# Patient Record
Sex: FEMALE | Race: BLACK OR AFRICAN AMERICAN | NOT HISPANIC OR LATINO | Employment: OTHER | URBAN - METROPOLITAN AREA
[De-identification: names, ages, dates, MRNs, and addresses within clinical notes are randomized per-mention and may not be internally consistent; named-entity substitution may affect disease eponyms.]

---

## 2017-01-04 ENCOUNTER — OFFICE VISIT (OUTPATIENT)
Dept: OPHTHALMOLOGY | Facility: CLINIC | Age: 72
End: 2017-01-04
Payer: MEDICARE

## 2017-01-04 DIAGNOSIS — E11.3513 TYPE 2 DIABETES MELLITUS WITH BOTH EYES AFFECTED BY PROLIFERATIVE RETINOPATHY AND MACULAR EDEMA, WITH LONG-TERM CURRENT USE OF INSULIN: Primary | ICD-10-CM

## 2017-01-04 DIAGNOSIS — Z79.4 TYPE 2 DIABETES MELLITUS WITH BOTH EYES AFFECTED BY PROLIFERATIVE RETINOPATHY AND MACULAR EDEMA, WITH LONG-TERM CURRENT USE OF INSULIN: Primary | ICD-10-CM

## 2017-01-04 PROCEDURE — 92012 INTRM OPH EXAM EST PATIENT: CPT | Mod: 25,S$GLB,, | Performed by: OPHTHALMOLOGY

## 2017-01-04 PROCEDURE — 99999 PR PBB SHADOW E&M-EST. PATIENT-LVL II: CPT | Mod: PBBFAC,,, | Performed by: OPHTHALMOLOGY

## 2017-01-04 PROCEDURE — 67028 INJECTION EYE DRUG: CPT | Mod: LT,S$GLB,, | Performed by: OPHTHALMOLOGY

## 2017-01-04 PROCEDURE — 99499 UNLISTED E&M SERVICE: CPT | Mod: S$GLB,,, | Performed by: OPHTHALMOLOGY

## 2017-01-04 PROCEDURE — 92134 CPTRZ OPH DX IMG PST SGM RTA: CPT | Mod: S$GLB,,, | Performed by: OPHTHALMOLOGY

## 2017-01-04 RX ORDER — CIPROFLOXACIN HYDROCHLORIDE 3 MG/ML
1 SOLUTION/ DROPS OPHTHALMIC 4 TIMES DAILY
Qty: 5 ML | Refills: 0 | Status: SHIPPED | OUTPATIENT
Start: 2017-01-04 | End: 2017-01-08

## 2017-01-04 RX ORDER — CIPROFLOXACIN HYDROCHLORIDE 3 MG/ML
SOLUTION/ DROPS OPHTHALMIC
Qty: 5 ML | Refills: 0 | Status: SHIPPED | OUTPATIENT
Start: 2017-01-04 | End: 2017-01-04 | Stop reason: SDUPTHER

## 2017-01-04 NOTE — PROGRESS NOTES
===============================  Avril Monzon,   71 y.o. female   Last visit [facility]: :9/28/2016   Last visit eye dept. 9/28/2016  [facility]:  Corrected distance visual acuity was NLP in the right eye and 20/200 in the left eye.  Tonometry     Tonometry (Applanation, 1:44 PM)      Right Left   Pressure  14                 Wearing Rx     Wearing Rx      Sphere Cylinder Axis Add   Right +1.25 +0.25 135 +3.50   Left +0.75 +1.00 135 +3.50       Type:  PAL               Not recorded        Chief Complaint   Patient presents with    Spots and/or Floaters      lost to follow up last eylea os 9/28/16//    pt states that she is seeing flashes in her left eye         HPI     Spots and/or Floaters    Additional comments:  lost to follow up last eylea os 9/28/16//    pt   states that she is seeing flashes in her left eye            Comments   1.) DM SINCE 1995  PDR  S/P LASER AND INJECTIONS OS(DR BOX AND AT [facility])  Eylea OS p/27/16 for DME/PDR  2.) NLP OD SINCE BIRTH  3.) DENSE CAT OD / 2+NS OS  4.) Asteroid Hyalosis    EYLEA OS #4 9/28/16       Last edited by JADIEL Coello MD on 1/4/2017  2:35 PM. (History)      Read Studies: y  Vitalsy    ________________  1/4/2017  1. Type 2 diabetes mellitus with both eyes affected by proliferative retinopathy and macular edema, with long-term current use of insulin      .  A lot of old jxf laser os, close focal  3+ asteroid OS  ? Laser spread OS  Cataract = to 20/30, second opinion with Dr. Taylor in clinic today    1/4/2017  Diagnosis :  Os dme  Today:   Eylea (afibercept) 2 mg/0.05 ml Intravitreal Injection , OS   Follow up: rtc 1 mo      Call 24/7 for any worsening of vision. Check  OU QD. Gave my home phone number.      Procedure  Note:   OS}  Eylea (afibercept) 2 mg/0.05 ml Intravitreal Injection    I have explained the Risks, Benefits and Alternatives of the procedure in detail.  The patient voices understanding and all questions have been answered.  The patient agrees to  proceed as discussed.  LIDOCAINE 2% subconj bleb was used for anesthesia.  Topical betadine was used for antisepsis.  0.05 cc was  injected 3.7 mm from corneal limbus in the inferotemporal quadrant.  Following injection the IOP was less than thirty (<30) by tonopen.  The eye was then thoroughly irrigated with BSS.  Patient tolerated procedure well.  No complications were observed.  The Patient was educated that mild irritation tonight was normal secondary to topical antispsis use.  Pt was advised to call at any time day or night for pain, redness, or any decline in vision. I gave the patient my home number as well as the clinic on call number. Daily visual checks and Amsler grid testing were reviewed.    JADIEL Coello MD  Procedure ordered: y  Consent: y  Pre auth: y  MAR:y  Opnote: y  Charge capture:y  Sided procedure note: y       ===========================

## 2017-01-04 NOTE — MR AVS SNAPSHOT
[facility] Ophthalmology  [address]  [place] [address]  Phone: [phone]  Fax: [phone]                  Avril Monzon   2017 1:15 PM   Office Visit    Description:  Female : 1945   Provider:  JADIEL Coello MD   Department:  [facility] - Ophthalmology           Reason for Visit     Spots and/or Floaters           Diagnoses this Visit        Comments    Type 2 diabetes mellitus with both eyes affected by proliferative retinopathy and macular edema, with long-term current use of insulin    -  Primary            To Do List           Future Appointments        Provider Department Dept Phone    2017 9:45 AM LABORATORY, [facility] [phone]    2017 2:00 PM Rose Germain MD [facility] Internal Medicine [phone]      Goals (5 Years of Data)     None      Follow-Up and Disposition     Return in about 1 month (around 2017).       These Medications        Disp Refills Start End    ciprofloxacin HCl (CILOXAN) 0.3 % ophthalmic solution 5 mL 0 2017    Administer 1 drop in left eye, every 2 hours, while awake, for 2 days.  Then 1 drop, every 4 hours, while awake, for the next 5 days.    Pharmacy: [facility] [address] [place] [address] #: [phone]         [facility] On Call     [facility] On Call Nurse Care Line - 24/7 Assistance  Registered nurses in the [facility] Center provide clinical advisement, health education, appointment booking, and other advisory services.  Call for this free service at [phone].             Medications           Message regarding Medications     Verify the changes and/or additions to your medication regime listed below are the same as discussed with your clinician today.  If any of these changes or additions are incorrect, please notify your healthcare provider.        START taking these NEW medications        Refills    ciprofloxacin HCl (CILOXAN) 0.3 % ophthalmic  solution 0    Sig: Administer 1 drop in left eye, every 2 hours, while awake, for 2 days.  Then 1 drop, every 4 hours, while awake, for the next 5 days.    Class: Normal      These medications were administered today        Dose Freq    aflibercept Soln 2 mg 2 mg Clinic/HOD 1 time    Si.05 mLs (2 mg total) by Intravitreal route one time.    Class: Normal    Route: Intravitreal           Verify that the below list of medications is an accurate representation of the medications you are currently taking.  If none reported, the list may be blank. If incorrect, please contact your healthcare provider. Carry this list with you in case of emergency.           Current Medications     amlodipine (NORVASC) 10 MG tablet Take 1 tablet (10 mg total) by mouth once daily.    aspirin 81 MG Chew Take 1 tablet (81 mg total) by mouth once daily.    atorvastatin (LIPITOR) 80 MG tablet Take 80 mg by mouth once daily.    atorvastatin (LIPITOR) 80 MG tablet TAKE ONE TABLET BY MOUTH ONCE DAILY    blood sugar diagnostic Strp 1 strip by Misc.(Non-Drug; Combo Route) route 3 (three) times daily. relion ultima    calcitRIOL (ROCALTROL) 0.25 MCG Cap Take 1 capsule (0.25 mcg total) by mouth every other day.    CMPD Flurbiprofen 10%, Cyclobenzaprine 2%, Gabapentin 6%, Lidocaine 2%, & Prilocaine 2% in Lipoderm Active-Max Apply 1 Pump (1.6 g total) topically 2 (two) times daily.    furosemide (LASIX) 20 MG tablet Take 1 tablet (20 mg total) by mouth 2 (two) times daily.    hydrALAZINE (APRESOLINE) 10 MG tablet Take 2 tablets (20 mg total) by mouth every 8 (eight) hours.    insulin NPH-insulin regular, 70/30, (NOVOLIN 70/30) 100 unit/mL (70-30) injection INJECT 30 UNITS in am 25 units in pm    lancets (ACCU-CHEK SOFTCLIX LANCETS) Misc 1 lancet by Misc.(Non-Drug; Combo Route) route 3 (three) times daily.    lisinopril (PRINIVIL,ZESTRIL) 20 MG tablet Take 40 mg by mouth once daily.    ciprofloxacin HCl (CILOXAN) 0.3 % ophthalmic solution Administer  1 drop in left eye, every 2 hours, while awake, for 2 days.  Then 1 drop, every 4 hours, while awake, for the next 5 days.           Clinical Reference Information           Allergies as of 1/4/2017     Codeine    Darvon [Propoxyphene]      Immunizations Administered on Date of Encounter - 1/4/2017     None      Orders Placed During Today's Visit      Normal Orders This Visit    Posterior Segment OCT Retina-Both eyes     Prior Authorization Order

## 2017-02-01 DIAGNOSIS — E11.40 TYPE 2 DIABETES MELLITUS WITH DIABETIC NEUROPATHY: ICD-10-CM

## 2017-02-02 RX ORDER — HUMAN INSULIN 100 [USP'U]/ML
INJECTION, SUSPENSION SUBCUTANEOUS
Qty: 2 VIAL | Refills: 0 | Status: SHIPPED | OUTPATIENT
Start: 2017-02-02 | End: 2017-10-10 | Stop reason: SDUPTHER

## 2017-02-20 ENCOUNTER — PATIENT OUTREACH (OUTPATIENT)
Dept: ADMINISTRATIVE | Facility: HOSPITAL | Age: 72
End: 2017-02-20
Payer: MEDICAID

## 2017-02-20 DIAGNOSIS — M89.9 BONE DISORDER: Primary | ICD-10-CM

## 2017-02-28 ENCOUNTER — OFFICE VISIT (OUTPATIENT)
Dept: OPHTHALMOLOGY | Facility: CLINIC | Age: 72
End: 2017-02-28
Payer: MEDICARE

## 2017-02-28 DIAGNOSIS — E11.3513 TYPE 2 DIABETES MELLITUS WITH BOTH EYES AFFECTED BY PROLIFERATIVE RETINOPATHY AND MACULAR EDEMA, WITH LONG-TERM CURRENT USE OF INSULIN: Primary | ICD-10-CM

## 2017-02-28 DIAGNOSIS — Z79.4 TYPE 2 DIABETES MELLITUS WITH BOTH EYES AFFECTED BY PROLIFERATIVE RETINOPATHY AND MACULAR EDEMA, WITH LONG-TERM CURRENT USE OF INSULIN: Primary | ICD-10-CM

## 2017-02-28 PROCEDURE — 1126F AMNT PAIN NOTED NONE PRSNT: CPT | Mod: S$GLB,,, | Performed by: OPHTHALMOLOGY

## 2017-02-28 PROCEDURE — 99999 PR PBB SHADOW E&M-EST. PATIENT-LVL II: CPT | Mod: PBBFAC,,, | Performed by: OPHTHALMOLOGY

## 2017-02-28 PROCEDURE — 1160F RVW MEDS BY RX/DR IN RCRD: CPT | Mod: S$GLB,,, | Performed by: OPHTHALMOLOGY

## 2017-02-28 PROCEDURE — 99499 UNLISTED E&M SERVICE: CPT | Mod: S$GLB,,, | Performed by: OPHTHALMOLOGY

## 2017-02-28 PROCEDURE — 92134 CPTRZ OPH DX IMG PST SGM RTA: CPT | Mod: S$GLB,,, | Performed by: OPHTHALMOLOGY

## 2017-02-28 PROCEDURE — 99213 OFFICE O/P EST LOW 20 MIN: CPT | Mod: S$GLB,,, | Performed by: OPHTHALMOLOGY

## 2017-02-28 PROCEDURE — 1159F MED LIST DOCD IN RCRD: CPT | Mod: S$GLB,,, | Performed by: OPHTHALMOLOGY

## 2017-02-28 PROCEDURE — 1157F ADVNC CARE PLAN IN RCRD: CPT | Mod: S$GLB,,, | Performed by: OPHTHALMOLOGY

## 2017-02-28 PROCEDURE — 3044F HG A1C LEVEL LT 7.0%: CPT | Mod: S$GLB,,, | Performed by: OPHTHALMOLOGY

## 2017-02-28 NOTE — PROGRESS NOTES
===============================  Avril Monzon,   72 y.o. female   Last visit [facility]: :1/4/2017   Last visit eye dept. 1/4/2017  [facility]:  Corrected distance visual acuity was NLP in the right eye and 20/40 in the left eye.   Not recorded         Not recorded         Not recorded        Chief Complaint   Patient presents with    pdr     here to rosalia pdr, pt states vision is not good, wants to talk about lasik        HPI     pdr    Additional comments: here to rosalia pdr, pt states vision is not good,   wants to talk about lasik           Comments   Noncompliant with visits    1.) DM SINCE 1995  PDR  S/P LASER AND INJECTIONS OS(DR BOX AND AT [facility] [place])  2.) NLP OD SINCE BIRTH  3.) DENSE CAT OD / 2+NS OS  4.) Asteroid Hyalosis    EYLEA OS 1/4/17    Updated 2/28/17 CGL       Last edited by JOSE Alegre on 2/28/2017 11:34 AM. (History)      Read Studies: y  Vitalsy    ________________  2/28/2017  1. Type 2 diabetes mellitus with both eyes affected by proliferative retinopathy and macular edema, with long-term current use of insulin      moct os thin, stable from last visit  Doing great  No tx needed  rtc 6 mos       ===========================

## 2017-02-28 NOTE — MR AVS SNAPSHOT
[facility] - Ophthalmology  [address] [place] [address]  Phone: [phone]  Fax: [phone]                  Avril Monzon   2017 11:15 AM   Office Visit    Description:  Female : 1945   Provider:  JADIEL Coello MD   Department:  [facility] - Ophthalmology           Reason for Visit     pdr           Diagnoses this Visit        Comments    Type 2 diabetes mellitus with both eyes affected by proliferative retinopathy and macular edema, with long-term current use of insulin    -  Primary            To Do List           Future Appointments        Provider Department Dept Phone    3/22/2017 9:00 AM Angela Hanson MD [facility] - Nephrology [phone]    2017 8:40 AM Maged Murray DPM [facility] Podiatry [phone]    2017 9:40 AM Martínez Mcknight MD [facility] - Interventional Pain [phone]    2017 10:00 AM JADIEL Coello MD [facility] Ophthalmology [phone]      Goals (5 Years of Data)     None      Follow-Up and Disposition     Return in about 6 months (around 2017) for dm eye exam.      [facility] On Call     [facility] On Call Nurse Care Line - 24/7 Assistance  Registered nurses in the [facility] On Call Center provide clinical advisement, health education, appointment booking, and other advisory services.  Call for this free service at [phone].             Medications           Message regarding Medications     Verify the changes and/or additions to your medication regime listed below are the same as discussed with your clinician today.  If any of these changes or additions are incorrect, please notify your healthcare provider.             Verify that the below list of medications is an accurate representation of the medications you are currently taking.  If none reported, the list may be blank. If incorrect, please contact your healthcare provider. Carry this list with you in case of emergency.           Current Medications     amlodipine (NORVASC) 10 MG tablet Take 1  tablet (10 mg total) by mouth once daily.    aspirin 81 MG Chew Take 1 tablet (81 mg total) by mouth once daily.    atorvastatin (LIPITOR) 80 MG tablet Take 80 mg by mouth once daily.    atorvastatin (LIPITOR) 80 MG tablet TAKE ONE TABLET BY MOUTH ONCE DAILY    blood sugar diagnostic Strp 1 strip by Misc.(Non-Drug; Combo Route) route 3 (three) times daily. relion ultima    calcitRIOL (ROCALTROL) 0.25 MCG Cap Take 1 capsule (0.25 mcg total) by mouth every other day.    CMPD Flurbiprofen 10%, Cyclobenzaprine 2%, Gabapentin 6%, Lidocaine 2%, & Prilocaine 2% in Lipoderm Active-Max Apply 1 Pump (1.6 g total) topically 2 (two) times daily.    furosemide (LASIX) 20 MG tablet Take 1 tablet (20 mg total) by mouth 2 (two) times daily.    hydrALAZINE (APRESOLINE) 10 MG tablet Take 2 tablets (20 mg total) by mouth every 8 (eight) hours.    lancets (ACCU-CHEK SOFTCLIX LANCETS) Misc 1 lancet by Misc.(Non-Drug; Combo Route) route 3 (three) times daily.    lisinopril (PRINIVIL,ZESTRIL) 20 MG tablet Take 40 mg by mouth once daily.    NOVOLIN 70/30 100 unit/mL (70-30) injection INJECT SUBCUTANEOUSLY 30 UNITS IN THE MORNING AND INJECT 25 UNITS IN THE EVENING           Clinical Reference Information           Allergies as of 2/28/2017     Codeine    Darvon [Propoxyphene]      Immunizations Administered on Date of Encounter - 2/28/2017     None      Orders Placed During Today's Visit      Normal Orders This Visit    Posterior Segment OCT Retina-Both eyes       Language Assistance Services     ATTENTION: Language assistance services are available, free of charge. Please call [phone].      ATENCIÓN: Si blaisela victoriano, tiene a ramirez disposición servicios gratuitos de asistencia lingüística. Llame al [phone].     [facility] Ý: N?u b?n nói Ti?ng Vi?t, có các d?ch v? h? tr? ngôn ng? mi?n phí dành cho b?n. G?i s? [phone].         [facility] - Ophthalmology complies with applicable Federal civil rights laws and does not discriminate on the  basis of race, color, national origin, age, disability, or sex.

## 2017-03-20 RX ORDER — CALCITRIOL 0.25 UG/1
CAPSULE ORAL
Qty: 15 CAPSULE | Refills: 0 | Status: SHIPPED | OUTPATIENT
Start: 2017-03-20 | End: 2017-05-09

## 2017-03-23 RX ORDER — AMLODIPINE BESYLATE 10 MG/1
TABLET ORAL
Qty: 30 TABLET | Refills: 11 | Status: SHIPPED | OUTPATIENT
Start: 2017-03-23 | End: 2018-04-08 | Stop reason: SDUPTHER

## 2017-05-04 ENCOUNTER — OFFICE VISIT (OUTPATIENT)
Dept: INTERNAL MEDICINE | Facility: CLINIC | Age: 72
End: 2017-05-04
Payer: MEDICARE

## 2017-05-04 ENCOUNTER — HOSPITAL ENCOUNTER (OUTPATIENT)
Dept: RADIOLOGY | Facility: HOSPITAL | Age: 72
Discharge: HOME | End: 2017-05-04
Attending: INTERNAL MEDICINE
Payer: MEDICARE

## 2017-05-04 VITALS
OXYGEN SATURATION: 98 % | HEIGHT: 65 IN | DIASTOLIC BLOOD PRESSURE: 52 MMHG | BODY MASS INDEX: 26.12 KG/M2 | WEIGHT: 156.75 LBS | TEMPERATURE: 98 F | SYSTOLIC BLOOD PRESSURE: 118 MMHG | HEART RATE: 77 BPM

## 2017-05-04 DIAGNOSIS — J06.9 ACUTE URI: ICD-10-CM

## 2017-05-04 DIAGNOSIS — R05.9 COUGH: ICD-10-CM

## 2017-05-04 DIAGNOSIS — N18.9 CKD (CHRONIC KIDNEY DISEASE), UNSPECIFIED STAGE: ICD-10-CM

## 2017-05-04 DIAGNOSIS — I15.2 HYPERTENSION ASSOCIATED WITH DIABETES: ICD-10-CM

## 2017-05-04 DIAGNOSIS — E11.59 HYPERTENSION ASSOCIATED WITH DIABETES: ICD-10-CM

## 2017-05-04 DIAGNOSIS — R05.9 COUGH: Primary | ICD-10-CM

## 2017-05-04 LAB
FLUAV AG SPEC QL IA: NEGATIVE
FLUBV AG SPEC QL IA: NEGATIVE
SPECIMEN SOURCE: NORMAL

## 2017-05-04 PROCEDURE — 3074F SYST BP LT 130 MM HG: CPT | Mod: S$GLB,,, | Performed by: PHYSICIAN ASSISTANT

## 2017-05-04 PROCEDURE — 71020 XR CHEST PA AND LATERAL: CPT | Mod: 26,,, | Performed by: RADIOLOGY

## 2017-05-04 PROCEDURE — 99999 PR PBB SHADOW E&M-EST. PATIENT-LVL IV: CPT | Mod: PBBFAC,,, | Performed by: PHYSICIAN ASSISTANT

## 2017-05-04 PROCEDURE — 99213 OFFICE O/P EST LOW 20 MIN: CPT | Mod: S$GLB,,, | Performed by: PHYSICIAN ASSISTANT

## 2017-05-04 PROCEDURE — 99499 UNLISTED E&M SERVICE: CPT | Mod: S$GLB,,, | Performed by: PHYSICIAN ASSISTANT

## 2017-05-04 PROCEDURE — 1157F ADVNC CARE PLAN IN RCRD: CPT | Mod: 8P,S$GLB,, | Performed by: PHYSICIAN ASSISTANT

## 2017-05-04 PROCEDURE — 1160F RVW MEDS BY RX/DR IN RCRD: CPT | Mod: S$GLB,,, | Performed by: PHYSICIAN ASSISTANT

## 2017-05-04 PROCEDURE — 1159F MED LIST DOCD IN RCRD: CPT | Mod: S$GLB,,, | Performed by: PHYSICIAN ASSISTANT

## 2017-05-04 PROCEDURE — 3078F DIAST BP <80 MM HG: CPT | Mod: S$GLB,,, | Performed by: PHYSICIAN ASSISTANT

## 2017-05-04 PROCEDURE — 71020 XR CHEST PA AND LATERAL: CPT | Mod: TC,PO

## 2017-05-04 PROCEDURE — 3066F NEPHROPATHY DOC TX: CPT | Mod: S$GLB,,, | Performed by: PHYSICIAN ASSISTANT

## 2017-05-04 PROCEDURE — 3044F HG A1C LEVEL LT 7.0%: CPT | Mod: S$GLB,,, | Performed by: PHYSICIAN ASSISTANT

## 2017-05-04 RX ORDER — DOXYCYCLINE HYCLATE 100 MG
100 TABLET ORAL 2 TIMES DAILY
COMMUNITY
End: 2017-05-16

## 2017-05-04 RX ORDER — BENZONATATE 100 MG/1
100 CAPSULE ORAL 3 TIMES DAILY PRN
COMMUNITY
End: 2017-05-16

## 2017-05-04 NOTE — MR AVS SNAPSHOT
[facility] Internal Medicine  [address] [place] [address]  Phone: [phone]  Fax: [phone]                  Avril Monzon   2017 1:20 PM   Office Visit    Description:  Female : 1945   Provider:  WILLIAN Delong   Department:  [facility] - Internal Medicine           Reason for Visit     Cough           Diagnoses this Visit        Comments    Cough    -  Primary     Acute URI                To Do List           Future Appointments        Provider Department Dept Phone    2017 2:15 PM [facility] XR2 [facility] [phone]    2017 8:00 AM Kim Doe MD [facility] Nephrology [phone]    2017 9:40 AM Martínez Mcknight MD [facility] - Interventional Pain [phone]    2017 2:30 PM [facility], [facility] Medicine [phone]    2017 3:20 PM Rose Germain MD [facility] [phone]      Goals (5 Years of Data)     None      [facility] On Call     [facility] On [facility] Nurse Care Line -  Assistance  Unless otherwise directed by your provider, please contact [facility], our nurse care line that is available for  assistance.     Registered nurses in the [facility] Center provide: appointment scheduling, clinical advisement, health education, and other advisory services.  Call: [phone] (toll free)               Medications           Message regarding Medications     Verify the changes and/or additions to your medication regime listed below are the same as discussed with your clinician today.  If any of these changes or additions are incorrect, please notify your healthcare provider.             Verify that the below list of medications is an accurate representation of the medications you are currently taking.  If none reported, the list may be blank. If incorrect, please contact your healthcare provider. Carry this list with you in case of emergency.           Current Medications     amlodipine (NORVASC) 10 MG  "tablet TAKE ONE TABLET BY MOUTH ONCE DAILY    atorvastatin (LIPITOR) 80 MG tablet Take 80 mg by mouth once daily.    benzonatate (TESSALON) 100 MG capsule Take 100 mg by mouth 3 (three) times daily as needed for Cough.    blood sugar diagnostic Strp 1 strip by Misc.(Non-Drug; Combo Route) route 3 (three) times daily. relion ultima    calcitRIOL (ROCALTROL) 0.25 MCG Cap TAKE ONE CAPSULE BY MOUTH EVERY OTHER DAY    CMPD Flurbiprofen 10%, Cyclobenzaprine 2%, Gabapentin 6%, Lidocaine 2%, & Prilocaine 2% in Lipoderm Active-Max Apply 1 Pump (1.6 g total) topically 2 (two) times daily.    doxycycline (VIBRA-TABS) 100 MG tablet Take 100 mg by mouth 2 (two) times daily.    furosemide (LASIX) 20 MG tablet Take 1 tablet (20 mg total) by mouth 2 (two) times daily.    hydrALAZINE (APRESOLINE) 10 MG tablet Take 2 tablets (20 mg total) by mouth every 8 (eight) hours.    lancets (ACCU-CHEK SOFTCLIX LANCETS) Misc 1 lancet by Misc.(Non-Drug; Combo Route) route 3 (three) times daily.    lisinopril (PRINIVIL,ZESTRIL) 20 MG tablet Take 40 mg by mouth once daily.    NOVOLIN 70/30 100 unit/mL (70-30) injection INJECT SUBCUTANEOUSLY 30 UNITS IN THE MORNING AND INJECT 25 UNITS IN THE EVENING    aspirin 81 MG Chew Take 1 tablet (81 mg total) by mouth once daily.           Clinical Reference Information           Your Vitals Were     BP Pulse Temp Height    118/52 (BP Location: Right arm, Patient Position: Sitting, BP Method: Manual) 77 97.6 °F (36.4 °C) (Tympanic) 5' 5" (1.651 m)    Weight SpO2 BMI    71.1 kg (156 lb 12 oz) 98% 26.08 kg/m2      Blood Pressure          Most Recent Value    BP  (!)  118/52      Allergies as of 5/4/2017     Codeine    Darvon [Propoxyphene]      Immunizations Administered on Date of Encounter - 5/4/2017     None      Orders Placed During Today's Visit      Normal Orders This Visit    Influenza antigen Nasopharyngeal Swab     Future Labs/Procedures Expected by Expires    X-Ray Chest PA And Lateral  5/4/2017 " 5/4/2018      Language Assistance Services     ATTENTION: Language assistance services are available, free of charge. Please call [phone].      ATENCIÓN: Si habla victoriano, tiene a ramirez disposición servicios gratuitos de asistencia lingüística. Llame al [phone].     CHÚ Ý: N?u b?n nói Ti?ng Vi?t, có các d?ch v? h? tr? ngôn ng? mi?n phí dành cho b?n. G?i s? [phone].         [facility] - Internal Medicine complies with applicable Federal civil rights laws and does not discriminate on the basis of race, color, national origin, age, disability, or sex.

## 2017-05-04 NOTE — PROGRESS NOTES
Subjective:       Patient ID: Avril Monzon is a 72 y.o. female.    Chief Complaint: Cough    HPI Comments: 72 year old female c/o myalgias, feeling warm, slightly productive cough with unknown color sputum, sneezing, clear rhinorrhea, scratchy throat, and nasal congestion X one week. She reports B anterior lower rib tenderness with coughing now. She reports no chills, ear pain, N/V, SOB, exertional sxs, edema, rash, or other medical complaints. She reports being seen at [facility] 5/1/17 and was prescribed doxycycline X 7 days and Tessalon Perles PRN. She reports taking the prescribed medications but sxs are continuing to worsen. Pt is overdue to see her PCP and specialists.    Past Medical History:  No date: Back pain  No date: Cataract  No date: CKD stage G4/A3, GFR 15 - 29 and albumin creat*      Comment: GFR 40% Jan 2014 and 33% ub 3/2014 ([facility])  No date: Diabetic retinopathy  No date: DM (diabetes mellitus) type II controlled, maximiliano*  No date: GERD (gastroesophageal reflux disease)  No date: Glaucoma  No date: Hyperlipidemia  No date: Hypertension  No date: Peripheral neuropathy  No date: Proteinuria      Comment: >6 mo   No date: Seizures      Comment: BRG 1/2014 -dick CT NRI showed small vessel        Cough   Associated symptoms include rhinorrhea and a sore throat. Pertinent negatives include no chest pain, chills, ear pain, fever, headaches, rash, shortness of breath or wheezing.     Review of Systems   Constitutional: Negative for chills and fever.   HENT: Positive for congestion, rhinorrhea and sore throat. Negative for ear pain.    Respiratory: Positive for cough. Negative for shortness of breath and wheezing.    Cardiovascular: Negative for chest pain, palpitations and leg swelling.   Gastrointestinal: Negative for nausea and vomiting.   Skin: Negative for rash.   Neurological: Negative for dizziness, weakness, numbness and headaches.   Psychiatric/Behavioral: Negative for confusion.        Objective:      Physical Exam   Constitutional: She is oriented to person, place, and time. She appears well-developed and well-nourished. No distress.   HENT:   Head: Normocephalic and atraumatic.   Right Ear: Tympanic membrane and ear canal normal.   Left Ear: Tympanic membrane and ear canal normal.   Mouth/Throat: Oropharynx is clear and moist. No oropharyngeal exudate.   Cobblestone appearance of posterior pharynx   Eyes: EOM are normal. No scleral icterus.   Neck: Neck supple.   Cardiovascular: Normal rate and regular rhythm.    Pulmonary/Chest: Effort normal and breath sounds normal. No respiratory distress. She has no decreased breath sounds. She has no wheezes. She has no rhonchi. She has no rales.   Musculoskeletal: Normal range of motion. She exhibits no edema.   Lymphadenopathy:     She has no cervical adenopathy.   Neurological: She is alert and oriented to person, place, and time. No cranial nerve deficit.   Skin: Skin is warm and dry. No rash noted.   Psychiatric: She has a normal mood and affect. Her speech is normal and behavior is normal. Thought content normal.       Assessment:       1. Cough    2. Acute URI    3. Hypertension associated with diabetes    4. CKD (chronic kidney disease), unspecified stage        Plan:         1. CXR and rapid flu test today with review following.  2. Continue doxycycline as directed for now. Stop Tessalon Perles if not helping.   3. F/u with nephrology soon for eval of CKD. F/u with PCP for health management.  4. Monitor for new or worsening sxs. ER if sxs become severe.

## 2017-05-05 ENCOUNTER — TELEPHONE (OUTPATIENT)
Dept: INTERNAL MEDICINE | Facility: CLINIC | Age: 72
End: 2017-05-05

## 2017-05-05 NOTE — TELEPHONE ENCOUNTER
----- Message from WILLIAN Delong sent at 5/5/2017  8:07 AM CDT -----  Flu test negative. Recommend she complete the doxycycline. If no better by Monday, needs to RTC for re-evaluation. Urgent care if worse over the weekend.

## 2017-05-05 NOTE — TELEPHONE ENCOUNTER
----- Message from WILLIAN Delong sent at 5/4/2017  3:23 PM CDT -----  CXR is clear. Still waiting on flu result.

## 2017-05-08 ENCOUNTER — OFFICE VISIT (OUTPATIENT)
Dept: NEPHROLOGY | Facility: CLINIC | Age: 72
End: 2017-05-08
Payer: MEDICARE

## 2017-05-08 ENCOUNTER — LAB VISIT (OUTPATIENT)
Dept: LAB | Facility: HOSPITAL | Age: 72
End: 2017-05-08
Attending: INTERNAL MEDICINE
Payer: MEDICARE

## 2017-05-08 VITALS
WEIGHT: 154.75 LBS | HEIGHT: 67 IN | DIASTOLIC BLOOD PRESSURE: 50 MMHG | HEART RATE: 68 BPM | BODY MASS INDEX: 24.29 KG/M2 | SYSTOLIC BLOOD PRESSURE: 130 MMHG

## 2017-05-08 DIAGNOSIS — E11.29 PROTEINURIA DUE TO TYPE 2 DIABETES MELLITUS: ICD-10-CM

## 2017-05-08 DIAGNOSIS — N18.4 ANEMIA OF CHRONIC RENAL FAILURE, STAGE 4 (SEVERE): ICD-10-CM

## 2017-05-08 DIAGNOSIS — E11.21 DIABETIC NEPHROPATHY ASSOCIATED WITH TYPE 2 DIABETES MELLITUS: ICD-10-CM

## 2017-05-08 DIAGNOSIS — N18.4 CHRONIC KIDNEY DISEASE, STAGE IV (SEVERE): ICD-10-CM

## 2017-05-08 DIAGNOSIS — D63.1 ANEMIA OF CHRONIC RENAL FAILURE, STAGE 4 (SEVERE): ICD-10-CM

## 2017-05-08 DIAGNOSIS — R80.9 PROTEINURIA DUE TO TYPE 2 DIABETES MELLITUS: ICD-10-CM

## 2017-05-08 DIAGNOSIS — E87.5 HYPERKALEMIA: ICD-10-CM

## 2017-05-08 DIAGNOSIS — N25.81 SECONDARY HYPERPARATHYROIDISM: ICD-10-CM

## 2017-05-08 DIAGNOSIS — E87.20 METABOLIC ACIDEMIA: ICD-10-CM

## 2017-05-08 DIAGNOSIS — N18.4 CHRONIC KIDNEY DISEASE, STAGE IV (SEVERE): Primary | ICD-10-CM

## 2017-05-08 DIAGNOSIS — I10 ESSENTIAL HYPERTENSION: ICD-10-CM

## 2017-05-08 PROBLEM — E11.319 DIABETIC RETINOPATHY: Status: ACTIVE | Noted: 2017-05-08

## 2017-05-08 LAB
25(OH)D3+25(OH)D2 SERPL-MCNC: 9 NG/ML
ALBUMIN SERPL BCP-MCNC: 3.6 G/DL
ANION GAP SERPL CALC-SCNC: 11 MMOL/L
BASOPHILS # BLD AUTO: 0.01 K/UL
BASOPHILS NFR BLD: 0.2 %
BUN SERPL-MCNC: 89 MG/DL
CALCIUM SERPL-MCNC: 9.2 MG/DL
CHLORIDE SERPL-SCNC: 109 MMOL/L
CO2 SERPL-SCNC: 19 MMOL/L
CREAT SERPL-MCNC: 3.7 MG/DL
DIFFERENTIAL METHOD: ABNORMAL
EOSINOPHIL # BLD AUTO: 0 K/UL
EOSINOPHIL NFR BLD: 0.5 %
ERYTHROCYTE [DISTWIDTH] IN BLOOD BY AUTOMATED COUNT: 13.2 %
EST. GFR  (AFRICAN AMERICAN): 13.6 ML/MIN/1.73 M^2
EST. GFR  (NON AFRICAN AMERICAN): 11.8 ML/MIN/1.73 M^2
GLUCOSE SERPL-MCNC: 174 MG/DL
HCT VFR BLD AUTO: 31.2 %
HGB BLD-MCNC: 9.8 G/DL
IRON SERPL-MCNC: 75 UG/DL
LYMPHOCYTES # BLD AUTO: 1.1 K/UL
LYMPHOCYTES NFR BLD: 24.9 %
MCH RBC QN AUTO: 28.8 PG
MCHC RBC AUTO-ENTMCNC: 31.4 %
MCV RBC AUTO: 92 FL
MONOCYTES # BLD AUTO: 0.2 K/UL
MONOCYTES NFR BLD: 3.7 %
NEUTROPHILS # BLD AUTO: 3.1 K/UL
NEUTROPHILS NFR BLD: 70.7 %
PHOSPHATE SERPL-MCNC: 4.4 MG/DL
PLATELET # BLD AUTO: 217 K/UL
PMV BLD AUTO: 10.6 FL
POTASSIUM SERPL-SCNC: 5.2 MMOL/L
PTH-INTACT SERPL-MCNC: 292 PG/ML
RBC # BLD AUTO: 3.4 M/UL
SATURATED IRON: 26 %
SODIUM SERPL-SCNC: 139 MMOL/L
TOTAL IRON BINDING CAPACITY: 284 UG/DL
TRANSFERRIN SERPL-MCNC: 192 MG/DL
WBC # BLD AUTO: 4.34 K/UL

## 2017-05-08 PROCEDURE — 1126F AMNT PAIN NOTED NONE PRSNT: CPT | Mod: S$GLB,,, | Performed by: INTERNAL MEDICINE

## 2017-05-08 PROCEDURE — 3044F HG A1C LEVEL LT 7.0%: CPT | Mod: S$GLB,,, | Performed by: INTERNAL MEDICINE

## 2017-05-08 PROCEDURE — 36415 COLL VENOUS BLD VENIPUNCTURE: CPT

## 2017-05-08 PROCEDURE — 82610 CYSTATIN C: CPT

## 2017-05-08 PROCEDURE — 80069 RENAL FUNCTION PANEL: CPT

## 2017-05-08 PROCEDURE — 3066F NEPHROPATHY DOC TX: CPT | Mod: S$GLB,,, | Performed by: INTERNAL MEDICINE

## 2017-05-08 PROCEDURE — 99499 UNLISTED E&M SERVICE: CPT | Mod: S$GLB,,, | Performed by: INTERNAL MEDICINE

## 2017-05-08 PROCEDURE — 99999 PR PBB SHADOW E&M-EST. PATIENT-LVL III: CPT | Mod: PBBFAC,,, | Performed by: INTERNAL MEDICINE

## 2017-05-08 PROCEDURE — 1160F RVW MEDS BY RX/DR IN RCRD: CPT | Mod: S$GLB,,, | Performed by: INTERNAL MEDICINE

## 2017-05-08 PROCEDURE — 83540 ASSAY OF IRON: CPT

## 2017-05-08 PROCEDURE — 83970 ASSAY OF PARATHORMONE: CPT

## 2017-05-08 PROCEDURE — 99215 OFFICE O/P EST HI 40 MIN: CPT | Mod: S$GLB,,, | Performed by: INTERNAL MEDICINE

## 2017-05-08 PROCEDURE — 85025 COMPLETE CBC W/AUTO DIFF WBC: CPT

## 2017-05-08 PROCEDURE — 3075F SYST BP GE 130 - 139MM HG: CPT | Mod: S$GLB,,, | Performed by: INTERNAL MEDICINE

## 2017-05-08 PROCEDURE — 1159F MED LIST DOCD IN RCRD: CPT | Mod: S$GLB,,, | Performed by: INTERNAL MEDICINE

## 2017-05-08 PROCEDURE — 82306 VITAMIN D 25 HYDROXY: CPT

## 2017-05-08 PROCEDURE — 3078F DIAST BP <80 MM HG: CPT | Mod: S$GLB,,, | Performed by: INTERNAL MEDICINE

## 2017-05-08 NOTE — MR AVS SNAPSHOT
[facility] Nephrology  [address] [place] [address]  Phone: [phone]  Fax: [phone]                  Avril Monzon   2017 2:00 PM   Office Visit    Description:  Female : 1945   Provider:  Kim Doe MD   Department:  [facility] - Nephrology           Reason for Visit     Chronic Kidney Disease     Proteinuria     Anemia           Diagnoses this Visit        Comments    Chronic kidney disease, stage IV (severe)    -  Primary     Essential hypertension         Secondary hyperparathyroidism         Anemia of chronic renal failure, stage 4 (severe)         Proteinuria due to type 2 diabetes mellitus         Diabetic nephropathy associated with type 2 diabetes mellitus         Metabolic acidemia                To Do List           Future Appointments        Provider Department Dept Phone    2017 2:30 PM SPECIMEN, [facility] [phone]    2017 9:40 AM Martínez Mcknight MD [facility] - Interventional Pain [phone]    2017 2:30 PM [facility] Internal Medicine [phone]    2017 3:20 PM Rose Germain MD [facility] Internal Medicine [phone]    2017 10:30 AM SPECIMEN, [facility] [phone]      Goals (5 Years of Data)     None      Follow-Up and Disposition     Return in about 2 months (around 2017).      [facility]     [facility] Nurse Care Line -  Assistance  Unless otherwise directed by your provider, please contact [facility], our nurse care line that is available for  assistance.     Registered nurses in the [facility] provide: appointment scheduling, clinical advisement, health education, and other advisory services.  Call: [phone] (toll free)               Medications           Message regarding Medications     Verify the changes and/or additions to your medication regime listed below are the same as discussed with your  "clinician today.  If any of these changes or additions are incorrect, please notify your healthcare provider.        STOP taking these medications     aspirin 81 MG Chew Take 1 tablet (81 mg total) by mouth once daily.           Verify that the below list of medications is an accurate representation of the medications you are currently taking.  If none reported, the list may be blank. If incorrect, please contact your healthcare provider. Carry this list with you in case of emergency.           Current Medications     amlodipine (NORVASC) 10 MG tablet TAKE ONE TABLET BY MOUTH ONCE DAILY    atorvastatin (LIPITOR) 80 MG tablet Take 80 mg by mouth once daily.    benzonatate (TESSALON) 100 MG capsule Take 100 mg by mouth 3 (three) times daily as needed for Cough.    blood sugar diagnostic Strp 1 strip by Misc.(Non-Drug; Combo Route) route 3 (three) times daily. relion ultima    calcitRIOL (ROCALTROL) 0.25 MCG Cap TAKE ONE CAPSULE BY MOUTH EVERY OTHER DAY    CMPD Flurbiprofen 10%, Cyclobenzaprine 2%, Gabapentin 6%, Lidocaine 2%, & Prilocaine 2% in Lipoderm Active-Max Apply 1 Pump (1.6 g total) topically 2 (two) times daily.    doxycycline (VIBRA-TABS) 100 MG tablet Take 100 mg by mouth 2 (two) times daily.    furosemide (LASIX) 20 MG tablet Take 1 tablet (20 mg total) by mouth 2 (two) times daily.    hydrALAZINE (APRESOLINE) 10 MG tablet Take 2 tablets (20 mg total) by mouth every 8 (eight) hours.    lancets (ACCU-CHEK SOFTCLIX LANCETS) Misc 1 lancet by Misc.(Non-Drug; Combo Route) route 3 (three) times daily.    lisinopril (PRINIVIL,ZESTRIL) 20 MG tablet Take 40 mg by mouth once daily.    NOVOLIN 70/30 100 unit/mL (70-30) injection INJECT SUBCUTANEOUSLY 30 UNITS IN THE MORNING AND INJECT 25 UNITS IN THE EVENING           Clinical Reference Information           Your Vitals Were     BP Pulse Height Weight BMI    130/50 68 5' 7" (1.702 m) 70.2 kg (154 lb 12.2 oz) 24.24 kg/m2      Blood Pressure          Most Recent Value "    BP  (!)  130/50      Allergies as of 5/8/2017     Codeine    Darvon [Propoxyphene]      Immunizations Administered on Date of Encounter - 5/8/2017     None      Orders Placed During Today's Visit     Future Labs/Procedures Expected by Expires    Cystatin C, Serum  5/8/2017 7/7/2018    Vitamin D  5/8/2017 7/7/2018    Recurring Lab Work Interval Expires    CBC auto differential   7/7/2018    Iron and TIBC   7/7/2018    PTH, intact   7/7/2018    Renal function panel   7/7/2018    Protein / creatinine ratio, urine   7/7/2018    Urinalysis   7/7/2018      Instructions    DR. Candelario [phone]    [address]        Language Assistance Services     ATTENTION: Language assistance services are available, free of charge. Please call [phone].      ATENCIÓN: Si habla jayrojoe, tiene a ramirez disposición servicios gratuitos de asistencia lingüística. Llame al [phone].     CHÚ Ý: N?u b?n nói Ti?ng Vi?t, có các d?ch v? h? tr? ngôn ng? mi?n phí dành cho b?n. G?i s? [phone].         [facility] complies with applicable Federal civil rights laws and does not discriminate on the basis of race, color, national origin, age, disability, or sex.

## 2017-05-08 NOTE — PROGRESS NOTES
"Subjective:       Patient ID: Avril Monzon is a 72 y.o. Black or  female who presents for re-evaluation of progression of Chronic Kidney Disease; Proteinuria; and Anemia    HPI     Patient is a 73 YO AAF with diabetic nephropathy for many years. Now has CKD-IV and Hx of retinopathy. Comes for follow up as a new patient to me     Has seen dr. Hanson in the past     No labs or visit this year    Review of Systems   Constitutional: Negative for activity change, appetite change, chills, diaphoresis, fatigue, fever and unexpected weight change.   HENT: Negative for congestion, dental problem, drooling, postnasal drip, rhinorrhea and voice change.    Eyes: Negative for discharge.   Respiratory: Negative for apnea, cough, choking, chest tightness, shortness of breath, wheezing and stridor.    Cardiovascular: Negative for chest pain, palpitations and leg swelling.   Gastrointestinal: Negative for abdominal distention, blood in stool, constipation, diarrhea, nausea, rectal pain and vomiting.   Endocrine: Negative for cold intolerance, heat intolerance, polydipsia and polyuria.   Genitourinary: Negative for decreased urine volume, difficulty urinating, dysuria, enuresis, flank pain, frequency, hematuria and urgency.   Musculoskeletal: Negative for arthralgias, back pain, gait problem and joint swelling.   Skin: Negative for rash.   Allergic/Immunologic: Negative for food allergies and immunocompromised state.   Neurological: Negative for dizziness, tremors, syncope, numbness and headaches.   Hematological: Does not bruise/bleed easily.   Psychiatric/Behavioral: Negative for agitation, behavioral problems and self-injury. The patient is not nervous/anxious and is not hyperactive.    All other systems reviewed and are negative.      Objective:   BP (!) 130/50  Pulse 68  Ht 5' 7" (1.702 m)  Wt 70.2 kg (154 lb 12.2 oz)  BMI 24.24 kg/m2     Physical Exam   Constitutional: She is oriented to person, place, and " time. No distress.   HENT:   Head: Normocephalic and atraumatic.   Nose: Nose normal.   Eyes: Conjunctivae and EOM are normal. Pupils are equal, round, and reactive to light.   Neck: Normal range of motion. No JVD present. No tracheal deviation present. No thyromegaly present.   Cardiovascular: Normal rate, regular rhythm, normal heart sounds and intact distal pulses.  Exam reveals no gallop and no friction rub.    No murmur heard.  Pulmonary/Chest: Effort normal and breath sounds normal. No respiratory distress. She has no wheezes. She has no rales. She exhibits no tenderness.   Abdominal: Soft. Bowel sounds are normal. She exhibits no distension and no mass. There is no tenderness. No hernia.   Musculoskeletal: Normal range of motion. She exhibits no edema, tenderness or deformity.   Neurological: She is alert and oriented to person, place, and time. She has normal reflexes. She displays normal reflexes. No cranial nerve deficit. She exhibits normal muscle tone. Coordination normal.   Skin: Skin is warm. She is not diaphoretic. No erythema. There is pallor.   Psychiatric: She has a normal mood and affect. Her behavior is normal. Judgment and thought content normal.   Nursing note and vitals reviewed.        Lab Results   Component Value Date    CREATININE 3.7 (H) 05/08/2017    BUN 89 (H) 05/08/2017     05/08/2017    K 5.2 (H) 05/08/2017     05/08/2017    CO2 19 (L) 05/08/2017     Lab Results   Component Value Date    WBC 4.34 05/08/2017    HGB 9.8 (L) 05/08/2017    HCT 31.2 (L) 05/08/2017    MCV 92 05/08/2017     05/08/2017     Lab Results   Component Value Date    .0 (H) 05/08/2017    CALCIUM 9.2 05/08/2017    PHOS 4.4 05/08/2017     Vitamin D level is 9 checked on 5/8/17    Assessment:    )    1. Chronic kidney disease, stage IV (severe)    2. Essential hypertension    3. Secondary hyperparathyroidism    4. Anemia of chronic renal failure, stage 4 (severe)    5. Proteinuria due to type  2 diabetes mellitus    6. Diabetic nephropathy associated with type 2 diabetes mellitus    7. Metabolic acidemia    8. Hyperkalemia        Plan:         1. Chronic kidney Disease stage IV: check cystatin C; avoid NSAids; patient's kidney function is about 15-20 percent.  She will need education class on follow-up.    2. HPT with severe vitamin D deficiency: Start on vitamin D  Rayaldee 30 microgram daily.  Stop calcitriol.    3. Anemia: check iron today ( has been in heme dept ) Will arrange follow-up in the hematology division for erythropoietin administration    4. HTN : controlled     5. Protienuria: check urine today     6:Acidosis: Add sodium bicarbonate    7: LBP: MRI reviewed : refer to chiropracter    8.  Hyperkalemia: Will treat metabolic acidosis and consider Kayexalate on follow-up      follow up in 2 months       Kim Doe MD

## 2017-05-09 ENCOUNTER — LAB VISIT (OUTPATIENT)
Dept: LAB | Facility: HOSPITAL | Age: 72
End: 2017-05-09
Attending: INTERNAL MEDICINE
Payer: MEDICARE

## 2017-05-09 DIAGNOSIS — R80.9 PROTEINURIA DUE TO TYPE 2 DIABETES MELLITUS: ICD-10-CM

## 2017-05-09 DIAGNOSIS — E11.21 DIABETIC NEPHROPATHY ASSOCIATED WITH TYPE 2 DIABETES MELLITUS: ICD-10-CM

## 2017-05-09 DIAGNOSIS — E11.29 PROTEINURIA DUE TO TYPE 2 DIABETES MELLITUS: ICD-10-CM

## 2017-05-09 DIAGNOSIS — E87.20 METABOLIC ACIDEMIA: ICD-10-CM

## 2017-05-09 DIAGNOSIS — N18.4 CHRONIC KIDNEY DISEASE, STAGE IV (SEVERE): ICD-10-CM

## 2017-05-09 DIAGNOSIS — I10 ESSENTIAL HYPERTENSION: ICD-10-CM

## 2017-05-09 DIAGNOSIS — N18.4 ANEMIA OF CHRONIC RENAL FAILURE, STAGE 4 (SEVERE): ICD-10-CM

## 2017-05-09 DIAGNOSIS — D63.1 ANEMIA OF CHRONIC RENAL FAILURE, STAGE 4 (SEVERE): ICD-10-CM

## 2017-05-09 DIAGNOSIS — N25.81 SECONDARY HYPERPARATHYROIDISM: ICD-10-CM

## 2017-05-09 LAB
BACTERIA #/AREA URNS HPF: NORMAL /HPF
BILIRUB UR QL STRIP: NEGATIVE
CLARITY UR: CLEAR
COLOR UR: YELLOW
CREAT UR-MCNC: 73 MG/DL
GLUCOSE UR QL STRIP: NEGATIVE
HGB UR QL STRIP: NEGATIVE
HYALINE CASTS #/AREA URNS LPF: 0 /LPF
KETONES UR QL STRIP: NEGATIVE
LEUKOCYTE ESTERASE UR QL STRIP: NEGATIVE
MICROSCOPIC COMMENT: NORMAL
NITRITE UR QL STRIP: NEGATIVE
PH UR STRIP: 6 [PH] (ref 5–8)
PROT UR QL STRIP: ABNORMAL
PROT UR-MCNC: 38 MG/DL
PROT/CREAT RATIO, UR: 0.52
RBC #/AREA URNS HPF: 0 /HPF (ref 0–4)
SP GR UR STRIP: 1.01 (ref 1–1.03)
SQUAMOUS #/AREA URNS HPF: 1 /HPF
URN SPEC COLLECT METH UR: ABNORMAL
WBC #/AREA URNS HPF: 0 /HPF (ref 0–5)
WBC CLUMPS URNS QL MICRO: NORMAL

## 2017-05-09 PROCEDURE — 84156 ASSAY OF PROTEIN URINE: CPT

## 2017-05-09 PROCEDURE — 81000 URINALYSIS NONAUTO W/SCOPE: CPT | Mod: PO

## 2017-05-09 RX ORDER — SODIUM BICARBONATE 325 MG/1
325 TABLET ORAL 3 TIMES DAILY
Qty: 90 TABLET | Refills: 11 | Status: SHIPPED | OUTPATIENT
Start: 2017-05-09 | End: 2018-06-12

## 2017-05-10 ENCOUNTER — TELEPHONE (OUTPATIENT)
Dept: NEPHROLOGY | Facility: CLINIC | Age: 72
End: 2017-05-10

## 2017-05-10 LAB
CYSTATIN C SERPL-MCNC: 2.96 MG/L
GFR/BSA.PRED SERPLBLD CYS-BASED-ARV: 16 ML/MIN/BSA

## 2017-05-10 NOTE — TELEPHONE ENCOUNTER
Called and informed patient of results and Dr. Doe's plan. Scheduled patient for her appointment and filled out Rayaldee form. Patient expressed understanding.

## 2017-05-10 NOTE — TELEPHONE ENCOUNTER
----- Message from Kim Doe MD sent at 5/10/2017 11:16 AM CDT -----  Making sure this was done ?  ----- Message -----     From: Hanane Phillip LPN     Sent: 5/9/2017   8:06 AM       To: Antonia Manzo LPN        ----- Message -----     From: Kim Doe MD     Sent: 5/9/2017   7:50 AM       To: Brook BURGER Staff    Please submit the paperwork for the new vitamin D have prescribed for the patient    Also notified the patient that she has to prescriptions to  one for the new vitamin D and the other one is for sodium bicarbonate    Patient to stop the calcitriol    Also please arrange the patient to follow-up in hematology for erythropoietin    Please also schedule the patient for education class if she is agreeable to it    Thanks

## 2017-05-16 ENCOUNTER — OFFICE VISIT (OUTPATIENT)
Dept: INTERNAL MEDICINE | Facility: CLINIC | Age: 72
End: 2017-05-16
Payer: MEDICARE

## 2017-05-16 ENCOUNTER — OFFICE VISIT (OUTPATIENT)
Dept: PAIN MEDICINE | Facility: CLINIC | Age: 72
End: 2017-05-16
Payer: MEDICARE

## 2017-05-16 VITALS
OXYGEN SATURATION: 97 % | HEIGHT: 67 IN | WEIGHT: 154.13 LBS | BODY MASS INDEX: 24.19 KG/M2 | SYSTOLIC BLOOD PRESSURE: 138 MMHG | DIASTOLIC BLOOD PRESSURE: 76 MMHG | HEART RATE: 85 BPM

## 2017-05-16 VITALS
HEIGHT: 67 IN | HEART RATE: 85 BPM | WEIGHT: 154 LBS | SYSTOLIC BLOOD PRESSURE: 135 MMHG | RESPIRATION RATE: 16 BRPM | DIASTOLIC BLOOD PRESSURE: 70 MMHG | BODY MASS INDEX: 24.17 KG/M2

## 2017-05-16 DIAGNOSIS — E11.42 CONTROLLED TYPE 2 DIABETES MELLITUS WITH DIABETIC POLYNEUROPATHY, WITH LONG-TERM CURRENT USE OF INSULIN: ICD-10-CM

## 2017-05-16 DIAGNOSIS — E55.9 VITAMIN D DEFICIENCY: ICD-10-CM

## 2017-05-16 DIAGNOSIS — Z79.4 CONTROLLED TYPE 2 DIABETES MELLITUS WITH DIABETIC POLYNEUROPATHY, WITH LONG-TERM CURRENT USE OF INSULIN: ICD-10-CM

## 2017-05-16 DIAGNOSIS — M51.36 DDD (DEGENERATIVE DISC DISEASE), LUMBAR: ICD-10-CM

## 2017-05-16 DIAGNOSIS — N18.4 CHRONIC KIDNEY DISEASE (CKD) STAGE G4/A3, SEVERELY DECREASED GLOMERULAR FILTRATION RATE (GFR) BETWEEN 15-29 ML/MIN/1.73 SQUARE METER AND ALBUMINURIA CREATININE RATIO GREATER THAN 300 MG/G: ICD-10-CM

## 2017-05-16 DIAGNOSIS — D50.9 IRON DEFICIENCY ANEMIA, UNSPECIFIED IRON DEFICIENCY ANEMIA TYPE: ICD-10-CM

## 2017-05-16 DIAGNOSIS — G62.9 PERIPHERAL POLYNEUROPATHY: ICD-10-CM

## 2017-05-16 DIAGNOSIS — E11.3513 TYPE 2 DIABETES MELLITUS WITH BOTH EYES AFFECTED BY PROLIFERATIVE RETINOPATHY AND MACULAR EDEMA, WITH LONG-TERM CURRENT USE OF INSULIN: ICD-10-CM

## 2017-05-16 DIAGNOSIS — I77.9 BILATERAL CAROTID ARTERY DISEASE: ICD-10-CM

## 2017-05-16 DIAGNOSIS — I15.2 HYPERTENSION ASSOCIATED WITH DIABETES: Chronic | ICD-10-CM

## 2017-05-16 DIAGNOSIS — Z00.00 ENCOUNTER FOR PREVENTIVE HEALTH EXAMINATION: Primary | ICD-10-CM

## 2017-05-16 DIAGNOSIS — Z86.73 HISTORY OF CVA (CEREBROVASCULAR ACCIDENT): ICD-10-CM

## 2017-05-16 DIAGNOSIS — M47.817 SPONDYLOSIS OF LUMBOSACRAL REGION WITHOUT MYELOPATHY OR RADICULOPATHY: ICD-10-CM

## 2017-05-16 DIAGNOSIS — Z79.4 TYPE 2 DIABETES MELLITUS WITH BOTH EYES AFFECTED BY PROLIFERATIVE RETINOPATHY AND MACULAR EDEMA, WITH LONG-TERM CURRENT USE OF INSULIN: ICD-10-CM

## 2017-05-16 DIAGNOSIS — K59.00 CONSTIPATION, UNSPECIFIED CONSTIPATION TYPE: ICD-10-CM

## 2017-05-16 DIAGNOSIS — E21.3 HYPERPARATHYROIDISM: ICD-10-CM

## 2017-05-16 DIAGNOSIS — I70.0 AORTIC ATHEROSCLEROSIS: ICD-10-CM

## 2017-05-16 DIAGNOSIS — M79.18 MYOFASCIAL PAIN: ICD-10-CM

## 2017-05-16 DIAGNOSIS — E11.59 HYPERTENSION ASSOCIATED WITH DIABETES: Chronic | ICD-10-CM

## 2017-05-16 DIAGNOSIS — M51.36 DDD (DEGENERATIVE DISC DISEASE), LUMBAR: Primary | ICD-10-CM

## 2017-05-16 DIAGNOSIS — E78.00 PURE HYPERCHOLESTEROLEMIA: Chronic | ICD-10-CM

## 2017-05-16 PROBLEM — M51.369 DDD (DEGENERATIVE DISC DISEASE), LUMBAR: Status: ACTIVE | Noted: 2017-05-16

## 2017-05-16 PROCEDURE — 3075F SYST BP GE 130 - 139MM HG: CPT | Mod: S$GLB,,, | Performed by: PHYSICIAN ASSISTANT

## 2017-05-16 PROCEDURE — 99999 PR PBB SHADOW E&M-EST. PATIENT-LVL IV: CPT | Mod: PBBFAC,,, | Performed by: PHYSICIAN ASSISTANT

## 2017-05-16 PROCEDURE — G0439 PPPS, SUBSEQ VISIT: HCPCS | Mod: S$GLB,,, | Performed by: PHYSICIAN ASSISTANT

## 2017-05-16 PROCEDURE — 1159F MED LIST DOCD IN RCRD: CPT | Mod: S$GLB,,, | Performed by: PHYSICIAN ASSISTANT

## 2017-05-16 PROCEDURE — 3078F DIAST BP <80 MM HG: CPT | Mod: S$GLB,,, | Performed by: PHYSICIAN ASSISTANT

## 2017-05-16 PROCEDURE — 1125F AMNT PAIN NOTED PAIN PRSNT: CPT | Mod: S$GLB,,, | Performed by: PHYSICIAN ASSISTANT

## 2017-05-16 PROCEDURE — 99499 UNLISTED E&M SERVICE: CPT | Mod: S$GLB,,, | Performed by: PHYSICIAN ASSISTANT

## 2017-05-16 PROCEDURE — 99214 OFFICE O/P EST MOD 30 MIN: CPT | Mod: S$GLB,,, | Performed by: PHYSICIAN ASSISTANT

## 2017-05-16 PROCEDURE — 1160F RVW MEDS BY RX/DR IN RCRD: CPT | Mod: S$GLB,,, | Performed by: PHYSICIAN ASSISTANT

## 2017-05-16 RX ORDER — HYDROCODONE BITARTRATE AND ACETAMINOPHEN 5; 325 MG/1; MG/1
1 TABLET ORAL 2 TIMES DAILY PRN
Qty: 60 TABLET | Refills: 0 | Status: SHIPPED | OUTPATIENT
Start: 2017-05-16 | End: 2017-06-15

## 2017-05-16 NOTE — PROGRESS NOTES
"Avril Monzon presented for a  Medicare AWV and comprehensive Health Risk Assessment today. The following components were reviewed and updated:    · Medical history  · Family History  · Social history  · Allergies and Current Medications  · Health Risk Assessment  · Health Maintenance  · Care Team     ** See Completed Assessments for Annual Wellness Visit within the encounter summary.**       The following assessments were completed:  · Living Situation  · CAGE  · Depression Screening  · Timed Get Up and Go  · Whisper Test  · Cognitive Function Screening  · Nutrition Screening  · ADL Screening  · PAQ Screening    Vitals:    05/16/17 1438   BP: 138/76   BP Location: Left arm   Patient Position: Sitting   BP Method: Manual   Pulse: 85   SpO2: 97%   Weight: 69.9 kg (154 lb 1.6 oz)   Height: 5' 7" (1.702 m)     Body mass index is 24.14 kg/(m^2).  Physical Exam   Constitutional: She appears well-developed and well-nourished. No distress.   HENT:   Head: Normocephalic and atraumatic.   Eyes: Conjunctivae and EOM are normal. Right eye exhibits no discharge. Left eye exhibits no discharge.   Neck: Normal range of motion. Neck supple. No tracheal deviation present. No thyromegaly present.   Cardiovascular: Normal rate, regular rhythm and normal heart sounds.    No murmur heard.  Pulses:       Radial pulses are 2+ on the right side, and 2+ on the left side.   Pulmonary/Chest: Effort normal and breath sounds normal. No respiratory distress. She has no wheezes.   Abdominal: Soft. Bowel sounds are normal. She exhibits no distension. There is no tenderness. There is no rebound and no guarding.   Musculoskeletal: Normal range of motion. She exhibits no edema or tenderness.   Neurological: No cranial nerve deficit.   Grasp equal both hands, No tremors, or muscle fasciculations noted. Toes downgoing, Sensation intact to soft touch. Gait: No ataxia.    Skin: Skin is warm and dry. No rash noted. She is not diaphoretic. No erythema. No " pallor.   Psychiatric: She has a normal mood and affect. Her behavior is normal. Judgment and thought content normal.         Diagnoses and health risks identified today and associated recommendations/orders:    1. Encounter for preventive health examination  Completed today.    2. Hypertension associated with diabetes  Stable. ON Amlodipine, Hydralazine, Lisinopril. Continue current treatment plan as previously prescribed with your PCP.    3. Controlled type 2 diabetes mellitus with diabetic polyneuropathy, with long-term current use of insulin  Stable. On Novolin. Reports intermittent paresthesia of feet. Continue current treatment plan as previously prescribed with your PCP.    4. Type 2 diabetes mellitus with both eyes affected by proliferative retinopathy and macular edema, with long-term current use of insulin  Stable. Continue current treatment plan as previously prescribed with your PCP and ophthalmologist, Dr. Coello. Appt 8/22/17.    5. Iron deficiency anemia, unspecified iron deficiency anemia type  Hematology for Erythropoietin administration. Continue current treatment plan as previously prescribed with your PCP and hematologist, Dr. Fletcher. Appt 5/18/17.     6. Chronic kidney disease (CKD) stage G4/A3, severely decreased glomerular filtration rate (GFR) between 15-29 mL/min/1.73 square meter and albuminuria creatinine ratio greater than 300 mg/g  Recommend avoid NSAID pain medications such as Advil, Aleve, Motrin, Ibuprofen, Naprosyn, Celebrex, Meloxicam, Diclofenac, etc.   Cystatin C 2.96. Continue follow nephrologist, Dr. BERNABE Doe. Appt 7/24/17    7. DDD (degenerative disc disease), lumbar  L MRI 1/28/16- L4-L5 level a circumferential disk bulge.  Bulging disk material in the inferior recess of the neural foramina on the left side with possible left sided L4 nerve root impingement.  Mild bilateral degenerative change of facets at the L4-L5 level.  Stable. Rx [place] and will start PT. Continue current  treatment plan as previously prescribed with pain clinic.    8. Pure hypercholesterolemia  Stable. On Atorvastatin. Continue current treatment plan as previously prescribed with your PCP.    9. Hyperparathyroidism  PTH 5/8/17 292. Severe Vit D deficiency 9. Stop Calcitriol and start vit D Milagros per Dr. Doe. Please continue follow nephrology.    10. Vitamin D deficiency  See #9    11. Bilateral carotid artery disease  Carotid US 5/26/14- Plaque bilaterally without elevation of the acquired Doppler measurements to indicate hemodynamically significant stenosis on either side.    12. Aortic atherosclerosis  Chest xray 5/4/2017- The heart is mildly enlarged.  There is calcification of the aortic knob  Discussed need to continue control BP, lipids, glucose, diet/ exercise, avoid smoking to avoid further arterial wall buildup.    13. History of CVA (cerebrovascular accident)  Hx of CVA 2012,2014 per pt. No residual effects.  Stable. Continue current treatment plan as previously prescribed with your PCP.    14. Constipation, unspecified constipation type  Stable. Continue current treatment plan as previously prescribed with your PCP.      Provided Avril with a 5-10 year written screening schedule and personal prevention plan. Recommendations were developed using the USPSTF age appropriate recommendations. Education, counseling, and referrals were provided as needed. After Visit Summary printed and given to patient which includes a list of additional screenings\tests needed.  Pt due for Dexa and Colonoscopy. Will discuss with PCP next appt. Continue to follow with your PCP as scheduled 6/20/17  or sooner if necessary.    Dutch Adams PA-C

## 2017-05-16 NOTE — PROGRESS NOTES
Subjective:   Chief Complaint: Avril Monzon is a 72 y.o. female who had concerns including Low-back Pain.    HPI:  Patient returns today for follow-up visit.  She complains of continued low back pain.  She has pain along multiple joints with the low back being the most bothersome. Pain is nonradiating, aching, constant, worse with activity.  Lower extremity strength preserved.  MRI shows L4/5 and L5/S1 facet disease and L4/5 DDD with left L4 NFS.    - duration of pain: pain for years   - timing: intermittent  - character: throbbing  - radiating, dermatomal: no radiation  - antecedent trauma, prior spinal surgery:  no prior surgery   - pertinent negatives: No fever, No chills, No weight loss, No bladder dysfunction, No bowel dysfunction, No saddle anesthesia  - pertinent positives: none    - medications, other therapies tried (physical therapy, injections):   >> Has previously undergone Physical Therapy with aquatherapy with some relief  >> Has not previously undergone spinal injection/s      IMAGING (reviewed on 5/16/2017):    MRI lumbar spine without contrast.01/28/16:  Standard multiplanar noncontrast MRI sequences of the lumbar spine.  The tip of the conus is at the L1 level. There is no abnormal signal in the cord.  T12-L1: Normal  L1-2: Normal  L2-3: Normal.  L3-4: Normal.  L4-5: Desiccation of the disk. Broad-based disk bulge. Bilateral degenerative changes of the facets. Narrowing of the lateral recesses with possible but not definite L4 nerve root impingement. Bilateral facet hypertrophy.  L5-S1: Normal signal in the disk. Mild degenerative changes of the facets.       10-22-15 XR LUMBAR:  Findings:  Overlying bowel and atherosclerotic vascular calcification combine to limit the study. Vertebral body height and alignment well maintained. Multilevel marginal spurring and facet arthropathy. Uniform loss of discoid at the L4 -- 5 level. No acute fracture or subluxation. No spondylolysis or spondylolisthesis.  "Pedicles and SI joints appear intact. Minimal degenerative change bilateral hips            Review of Systems   Constitution: Positive for malaise/fatigue and weight gain.        Tiredness when she's up mopping, walking etc... Resolves after sitting for a few seconds.    HENT: Positive for tinnitus.         Ringing in ears at times.    Eyes: Positive for blurred vision and vision loss in right eye.        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        Near syncope in July 2015  Respiratory: Positive for snoring.  She notes dyspnea on exertion.  Endocrine: Negative.    Hematologic/Lymphatic: Bruises/bleeds easily.   Skin: Positive for nail changes and itching.        Small lines in fingernails she has noticed        Itching in hands at times.    Musculoskeletal: Positive for arthritis, back pain, joint pain, muscle cramps, neck pain and stiffness.        Hip pain, shoulder pain, neck pain, Low Back Pain       Hip stiffness   Gastrointestinal: Positive for bloating, change in bowel habit, bowel incontinence, constipation and flatus.        Always had problems with bowels.        At times when she takes a stool softer she cannot make it to the bathroom in time    Genitourinary: Positive for flank pain, hesitancy, incomplete emptying and nocturia.   Neurological: Positive for excessive daytime sleepiness, loss of balance and seizures.        Seizure 2 years ago  Psychiatric/Behavioral:        Positive for memory loss.         Objective/ Physical:     Vitals:  /70 (BP Location: Right arm, Patient Position: Sitting, BP Method: Automatic)  Pulse 85  Resp 16  Ht 5' 7" (1.702 m)  Wt 69.9 kg (154 lb)  BMI 24.12 kg/m2   (reviewed on 5/16/2017)     General: alert and oriented   Gait: normal gait  Skin: No rashes, No discoloration, No obvious lesions  HEENT: EOMI  Respiratory: respirations nonlabored    Musculoskeletal:  - Any pain on flexion, extension, " rotation:    >> pain on extension and rotation    >> facet loading causes pain bilaterally, R>L  - Straight Leg Raise:   >> negative on the LEFT  >> negative on the RIGHT  - Any tenderness to palpation across lumbar paraspinal muscles, Sacroiliac joint/s, greater trochanteric bursae:    >> tenderness along lumbar paraspinals , mild, worse on the right      Neuro:  - Lower Extremity Strength:   >> 5/5 throughout on the LEFT  >> 5/5 throughout on the RIGHT  - Lower Extremity Reflexes:  >> 2+ on the LEFT  >> 2+ on the RIGHT  - Sensory (sensation to light touch):  >> intact on the LEFT  >> intact on the RIGHT        Psych: Mood and affect appropriate        Assessment:   Lumbar Spondylosis  Lumbar Facet Joint Syndrome  Lumbar DDD  Chronic Pain Syndrome          Plan:   Patient returns for a follow-up after >1 year absence. She complains of multiple joint pains, including facetogenic pain. MRI shows primarily facet disease, and she has L4/5 DDD and the potential for left L4 compression but no symptoms of this.    - We previously prescribed Norco 5mg BID PRN, which she never filled. She would like to try this again today. I instructed her to break in 1/2 and try as needed.   - Start PT with passive modalities, including ice and heat, and active modalities, including a regimen for stretching and strengthening. Order sent to [facility] to include aquatherapy.  - She will also see Sebastien daly for chiropractor therapy (ordered by her PCP).  - Lumbar MBB was explained to her today, and she was given handout. She would like to try medication + PT first.  RTC in 8 weeks for follow-up after PT if needed. I discussed the risks, benefits, and alternatives to potential treatment options. All questions and concerns were fully addressed today in clinic. Dr. Mcknight was consulted regarding the patient plan and agrees.               >> Pain Disability Index:  5/16/2017 :: 41

## 2017-05-16 NOTE — MR AVS SNAPSHOT
[facility]  [address] [facility] [place] [address]  Phone: [phone]  Fax: [phone]                  Avril Monzon   2017 9:40 AM   Office Visit    Description:  Female : 1945   Provider:  Katerine Isidro PA-C   Department:  [facility]           Reason for Visit     Low-back Pain           Diagnoses this Visit        Comments    DDD (degenerative disc disease), lumbar    -  Primary     Spondylosis of lumbosacral region without myelopathy or radiculopathy         Myofascial pain         Peripheral polyneuropathy                To Do List           Future Appointments        Provider Department Dept Phone    2017 2:30 PM Dutch Adams PA-C [facility] [phone]    2017 9:20 AM Dudley Fletcher Jr., MD [facility] Hemotology Oncology [phone]    2017 3:20 PM Rose Germain MD [facility] [phone]    2017 9:40 AM Katerine Isidro PA-C [facility] [phone]    2017 10:30 AM [facility] [phone]      Goals (5 Years of Data)     None       These Medications        Disp Refills Start End    hydrocodone-acetaminophen 5-325mg (NORCO) 5-325 mg per tablet 60 tablet 0 2017 6/15/2017    Take 1 tablet by mouth 2 (two) times daily as needed for Pain. - Oral    Pharmacy: [facility] [address] [place] [id number] [facility] Ph #: [phone]         [facility]     [facility] Care [facility] -  Assistance  Unless otherwise directed by your provider, please contact [facility], our nurse care line that is available for  assistance.     Registered nurses in the [facility] provide: appointment scheduling, clinical advisement, health education, and other advisory services.  Call: [phone] (toll free)               Medications           Message regarding Medications     Verify  the changes and/or additions to your medication regime listed below are the same as discussed with your clinician today.  If any of these changes or additions are incorrect, please notify your healthcare provider.             Verify that the below list of medications is an accurate representation of the medications you are currently taking.  If none reported, the list may be blank. If incorrect, please contact your healthcare provider. Carry this list with you in case of emergency.           Current Medications     amlodipine (NORVASC) 10 MG tablet TAKE ONE TABLET BY MOUTH ONCE DAILY    atorvastatin (LIPITOR) 80 MG tablet Take 80 mg by mouth once daily.    benzonatate (TESSALON) 100 MG capsule Take 100 mg by mouth 3 (three) times daily as needed for Cough.    blood sugar diagnostic Strp 1 strip by Misc.(Non-Drug; Combo Route) route 3 (three) times daily. relion ultima    calcifediol 30 mcg Cs24 Take 30 mcg by mouth once daily.    CMPD Flurbiprofen 10%, Cyclobenzaprine 2%, Gabapentin 6%, Lidocaine 2%, & Prilocaine 2% in Lipoderm Active-Max Apply 1 Pump (1.6 g total) topically 2 (two) times daily.    doxycycline (VIBRA-TABS) 100 MG tablet Take 100 mg by mouth 2 (two) times daily.    furosemide (LASIX) 20 MG tablet Take 1 tablet (20 mg total) by mouth 2 (two) times daily.    hydrALAZINE (APRESOLINE) 10 MG tablet Take 2 tablets (20 mg total) by mouth every 8 (eight) hours.    lancets (ACCU-CHEK SOFTCLIX LANCETS) Misc 1 lancet by Misc.(Non-Drug; Combo Route) route 3 (three) times daily.    lisinopril (PRINIVIL,ZESTRIL) 20 MG tablet Take 40 mg by mouth once daily.    NOVOLIN 70/30 100 unit/mL (70-30) injection INJECT SUBCUTANEOUSLY 30 UNITS IN THE MORNING AND INJECT 25 UNITS IN THE EVENING    sodium bicarbonate 325 MG tablet Take 1 tablet (325 mg total) by mouth 3 (three) times daily.    hydrocodone-acetaminophen 5-325mg (NORCO) 5-325 mg per tablet Take 1 tablet by mouth 2 (two) times daily as needed for Pain.          "  Clinical Reference Information           Your Vitals Were     BP Pulse Resp Height Weight BMI    135/70 (BP Location: Right arm, Patient Position: Sitting, BP Method: Automatic) 85 16 5' 7" (1.702 m) 69.9 kg (154 lb) 24.12 kg/m2      Blood Pressure          Most Recent Value    BP  135/70      Allergies as of 5/16/2017     Codeine    Darvon [Propoxyphene]      Immunizations Administered on Date of Encounter - 5/16/2017     None      Orders Placed During Today's Visit      Normal Orders This Visit    Ambulatory consult to Physical Therapy       Language Assistance Services     ATTENTION: Language assistance services are available, free of charge. Please call [phone].      ATENCIÓN: Si bolivar pastrana, tiene a ramirez disposición servicios gratuitos de asistencia lingüística. Llame al [phone].     CHÚ Ý: N?u b?n nói Ti?ng Vi?t, có các d?ch v? h? tr? ngôn ng? mi?n phí dành cho b?n. G?i s? [phone].         [facility] complies with applicable Federal civil rights laws and does not discriminate on the basis of race, color, national origin, age, disability, or sex.        "

## 2017-05-16 NOTE — PATIENT INSTRUCTIONS
Counseling and Referral of Other Preventative  (Italic type indicates deductible and co-insurance are waived)    Patient Name: Avril Monzon  Today's Date: 5/16/2017      SERVICE LIMITATIONS RECOMMENDATION    Vaccines    · Pneumococcal (once after 65)    · Influenza (annually)    · Hepatitis B (if medium/high risk)    · Prevnar 13      Hepatitis B medium/high risk factors:       - End-stage renal disease       - Hemophiliacs who received Factor VII or         IX concentrates       - Clients of institutions for the mentally             retarded       - Persons who live in the same house as          a HepB carrier       - Homosexual men       - Illicit injectable drug abusers     Pneumococcal: done 6/2014     Influenza:11/2016 Done, repeat in one year     Hepatitis B: N/A Follow PCP     Prevnar 13: done 1/13/2016     Mammogram (biennial age 50-74)  Annually (age 40 or over)  Last done 9/2016, recommend to repeat every 1  years    Pap (up to age 70 and after 70 if unknown history or abnormal study last 10 years)    N/A     The USPSTF recommends against screening for cervical cancer in women older than age 65 years who have had adequate prior screening and are not otherwise at high risk for cervical cancer.      Colorectal cancer screening (to age 75)    · Fecal occult blood test (annual)  · Flexible sigmoidoscopy (5y)  · Screening colonoscopy (10y)  · Barium enema   pt due for colonoscopy. Will discuss with PCP next appt.     Diabetes self-management training (no USPSTF recommendations)  Requires referral by treating physician for patient with diabetes or renal disease. 10 hours of initial DSMT sessions of no less than 30 minutes each in a continuous 12-month period. 2 hours of follow-up DSMT in subsequent years.  Continue follow PCP    Bone mass measurements (age 65 & older, biennial)  Requires diagnosis related to osteoporosis or estrogen deficiency. Biennial benefit unless patient has history of long-term  glucocorticoid  Due for Dexa. Will discuss with PCP next appt.    Glaucoma screening (no USPSTF recommendation)  Diabetes mellitus, family history   , age 50 or over    American, age 65 or over  continue follow Dr. Coello    Medical nutrition therapy for diabetes or renal disease (no recommended schedule)  Requires referral by treating physician for patient with diabetes or renal disease or kidney transplant within the past 3 years.  Can be provided in same year as diabetes self-management training (DSMT), and CMS recommends medical nutrition therapy take place after DSMT. Up to 3 hours for initial year and 2 hours in subsequent years.  follow PCP    Cardiovascular screening blood tests (every 5 years)  · Fasting lipid panel  Order as a panel if possible  done. Follow PCP    Diabetes screening tests (at least every 3 years, Medicare covers annually or at 6-month intervals for prediabetic patients)  · Fasting blood sugar (FBS) or glucose tolerance test (GTT)  Patient must be diagnosed with one of the following:       - Hypertension       - Dyslipidemia       - Obesity (BMI 30kg/m2)       - Previous elevated impaired FBS or GTT       ... or any two of the following:       - Overweight (BMI 25 but <30)       - Family history of diabetes       - Age 65 or older       - History of gestational diabetes or birth of baby weighing more than 9 pounds Continue follow PCP    Abdominal aortic aneurysm screening (once)  · Sonogram   Limited to patients who meet one of the following criteria:       - Men who are 65-75 years old and have smoked more than 100 cigarette in their lifetime       - Anyone with a family history of abdominal aortic aneurysm       - Anyone recommended for screening by the USPSTF  done 12/2015    HIV screening (annually for increased risk patients)  · HIV-1 and HIV-2 by EIA, or LINDA, rapid antibody test or oral mucosa transudate  Patients must be at increased risk for HIV infection  per USPSTF guidelines or pregnant. Tests covered annually for patient at increased risk or as requested by the patient. Pregnant patients may receive up to 3 tests during pregnancy.  Risks discussed, screening is not recommended    Smoking cessation counseling (up to 8 sessions per year)  Patients must be asymptomatic of tobacco-related conditions to receive as a preventative service.  Non-smoker    Subsequent annual wellness visit  At least 12 months since last AWV  Return in one year     The following information is provided to all patients.  This information is to help you find resources for any of the problems found today that may be affecting your health:                Living healthy guide: www.[facility].[place].[facility]      Understanding Diabetes: www.diabetes.org      Eating healthy: www.cdc.gov/healthyweight      CDC home safety checklist: www.cdc.gov/steadi/patient.html      Agency on Aging: www.goea.[place].[facility]      Alcoholics anonymous (AA): www.aa.org      Physical Activity: www.pawan.nih.gov/yj4qrux      Tobacco use: www.quitwithusla.org

## 2017-05-16 NOTE — MR AVS SNAPSHOT
[facility]  [address] [facility] [place] [address]  Phone: [phone]  Fax: [phone]                  Avril Monzon   2017 2:30 PM   Office Visit    Description:  Female : 1945   Provider:  Dutch Adams PA-C   Department:  [facility]           Reason for Visit     Health Risk Assessment           Diagnoses this Visit        Comments    Encounter for preventive health examination    -  Primary     Hypertension associated with diabetes         Controlled type 2 diabetes mellitus with diabetic polyneuropathy, with long-term current use of insulin         Type 2 diabetes mellitus with both eyes affected by proliferative retinopathy and macular edema, with long-term current use of insulin         Type 2 diabetes mellitus with other circulatory complication, with long-term current use of insulin         Iron deficiency anemia, unspecified iron deficiency anemia type         Chronic kidney disease (CKD) stage G4/A3, severely decreased glomerular filtration rate (GFR) between 15-29 mL/min/1.73 square meter and albuminuria creatinine ratio greater than 300 mg/g         DDD (degenerative disc disease), lumbar         Pure hypercholesterolemia         Hyperparathyroidism         Vitamin D deficiency         Bilateral carotid artery disease         Aortic atherosclerosis         History of CVA (cerebrovascular accident)         Constipation, unspecified constipation type                To Do List           Future Appointments        Provider Department Dept Phone    2017 9:20 AM Dudley Fletcher Jr., MD [facility] Hemotology Oncology [phone]    2017 3:20 PM Rose Germain MD [facility] [phone]    2017 9:40 AM [facility] [phone]    2017 10:30 AM SPECIMEN, [facility] [phone]    2017 10:40 AM LABORATORY, [address] [facility]  [phone]      Goals (5 Years of Data)     None      [facility]     [facility] Care [facility] - 24/7 Assistance  Unless otherwise directed by your provider, please contact [facility], our nurse care line that is available for 24/7 assistance.     Registered nurses in the [facility] provide: appointment scheduling, clinical advisement, health education, and other advisory services.  Call: [phone] (toll free)               Medications           Message regarding Medications     Verify the changes and/or additions to your medication regime listed below are the same as discussed with your clinician today.  If any of these changes or additions are incorrect, please notify your healthcare provider.        STOP taking these medications     benzonatate (TESSALON) 100 MG capsule Take 100 mg by mouth 3 (three) times daily as needed for Cough.    doxycycline (VIBRA-TABS) 100 MG tablet Take 100 mg by mouth 2 (two) times daily.           Verify that the below list of medications is an accurate representation of the medications you are currently taking.  If none reported, the list may be blank. If incorrect, please contact your healthcare provider. Carry this list with you in case of emergency.           Current Medications     amlodipine (NORVASC) 10 MG tablet TAKE ONE TABLET BY MOUTH ONCE DAILY    atorvastatin (LIPITOR) 80 MG tablet Take 80 mg by mouth once daily.    blood sugar diagnostic Strp 1 strip by Misc.(Non-Drug; Combo Route) route 3 (three) times daily. relion ultima    CMPD Flurbiprofen 10%, Cyclobenzaprine 2%, Gabapentin 6%, Lidocaine 2%, & Prilocaine 2% in Lipoderm Active-Max Apply 1 Pump (1.6 g total) topically 2 (two) times daily.    furosemide (LASIX) 20 MG tablet Take 1 tablet (20 mg total) by mouth 2 (two) times daily.    hydrALAZINE (APRESOLINE) 10 MG tablet Take 2 tablets (20 mg total) by mouth every 8 (eight) hours.    lancets (ACCU-CHEK SOFTCLIX LANCETS) [facility] 1 lancet  "by Misc.(Non-Drug; Combo Route) route 3 (three) times daily.    lisinopril (PRINIVIL,ZESTRIL) 20 MG tablet Take 40 mg by mouth once daily.    NOVOLIN 70/30 100 unit/mL (70-30) injection INJECT SUBCUTANEOUSLY 30 UNITS IN THE MORNING AND INJECT 25 UNITS IN THE EVENING    sodium bicarbonate 325 MG tablet Take 1 tablet (325 mg total) by mouth 3 (three) times daily.    calcifediol 30 mcg Cs24 Take by mouth.    hydrocodone-acetaminophen 5-325mg (NORCO) 5-325 mg per tablet Take 1 tablet by mouth 2 (two) times daily as needed for Pain.           Clinical Reference Information           Your Vitals Were     BP Pulse Height Weight SpO2 BMI    138/76 (BP Location: Left arm, Patient Position: Sitting, BP Method: Manual) 85 5' 7" (1.702 m) 69.9 kg (154 lb 1.6 oz) 97% 24.14 kg/m2      Blood Pressure          Most Recent Value    BP  138/76      Allergies as of 5/16/2017     Codeine    Darvon [Propoxyphene]      Immunizations Administered on Date of Encounter - 5/16/2017     None      Instructions      Counseling and Referral of Other Preventative  (Italic type indicates deductible and co-insurance are waived)    Patient Name: Avril Monzon  Today's Date: 5/16/2017      SERVICE LIMITATIONS RECOMMENDATION    Vaccines    · Pneumococcal (once after 65)    · Influenza (annually)    · Hepatitis B (if medium/high risk)    · Prevnar 13      Hepatitis B medium/high risk factors:       - End-stage renal disease       - Hemophiliacs who received Factor VII or         IX concentrates       - Clients of institutions for the mentally             retarded       - Persons who live in the same house as          a HepB carrier       - Homosexual men       - Illicit injectable drug abusers     Pneumococcal: done 6/2014     Influenza:11/2016 Done, repeat in one year     Hepatitis B: N/A Follow PCP     Prevnar 13: done 1/13/2016     Mammogram (biennial age 50-74)  Annually (age 40 or over)  Last done 9/2016, recommend to repeat every 1  years    Pap " (up to age 70 and after 70 if unknown history or abnormal study last 10 years)    N/A     The USPSTF recommends against screening for cervical cancer in women older than age 65 years who have had adequate prior screening and are not otherwise at high risk for cervical cancer.      Colorectal cancer screening (to age 75)    · Fecal occult blood test (annual)  · Flexible sigmoidoscopy (5y)  · Screening colonoscopy (10y)  · Barium enema   pt due for colonoscopy. Will discuss with PCP next appt.     Diabetes self-management training (no USPSTF recommendations)  Requires referral by treating physician for patient with diabetes or renal disease. 10 hours of initial DSMT sessions of no less than 30 minutes each in a continuous 12-month period. 2 hours of follow-up DSMT in subsequent years.  Continue follow PCP    Bone mass measurements (age 65 & older, biennial)  Requires diagnosis related to osteoporosis or estrogen deficiency. Biennial benefit unless patient has history of long-term glucocorticoid  Due for Dexa. Will discuss with PCP next appt.    Glaucoma screening (no USPSTF recommendation)  Diabetes mellitus, family history   , age 50 or over    American, age 65 or over  continue follow Dr. Coello    Medical nutrition therapy for diabetes or renal disease (no recommended schedule)  Requires referral by treating physician for patient with diabetes or renal disease or kidney transplant within the past 3 years.  Can be provided in same year as diabetes self-management training (DSMT), and CMS recommends medical nutrition therapy take place after DSMT. Up to 3 hours for initial year and 2 hours in subsequent years.  follow PCP    Cardiovascular screening blood tests (every 5 years)  · Fasting lipid panel  Order as a panel if possible  done. Follow PCP    Diabetes screening tests (at least every 3 years, Medicare covers annually or at 6-month intervals for prediabetic patients)  · Fasting blood sugar  (FBS) or glucose tolerance test (GTT)  Patient must be diagnosed with one of the following:       - Hypertension       - Dyslipidemia       - Obesity (BMI 30kg/m2)       - Previous elevated impaired FBS or GTT       ... or any two of the following:       - Overweight (BMI 25 but <30)       - Family history of diabetes       - Age 65 or older       - History of gestational diabetes or birth of baby weighing more than 9 pounds Continue follow PCP    Abdominal aortic aneurysm screening (once)  · Sonogram   Limited to patients who meet one of the following criteria:       - Men who are 65-75 years old and have smoked more than 100 cigarette in their lifetime       - Anyone with a family history of abdominal aortic aneurysm       - Anyone recommended for screening by the USPSTF  done 12/2015    HIV screening (annually for increased risk patients)  · HIV-1 and HIV-2 by EIA, or LINDA, rapid antibody test or oral mucosa transudate  Patients must be at increased risk for HIV infection per USPSTF guidelines or pregnant. Tests covered annually for patient at increased risk or as requested by the patient. Pregnant patients may receive up to 3 tests during pregnancy.  Risks discussed, screening is not recommended    Smoking cessation counseling (up to 8 sessions per year)  Patients must be asymptomatic of tobacco-related conditions to receive as a preventative service.  Non-smoker    Subsequent annual wellness visit  At least 12 months since last AWV  Return in one year     The following information is provided to all patients.  This information is to help you find resources for any of the problems found today that may be affecting your health:                Living healthy guide: www.[facility].[place].gov      Understanding Diabetes: www.diabetes.org      Eating healthy: www.cdc.gov/healthyweight      CDC home safety checklist: www.cdc.gov/steadi/patient.html      Agency on Aging: www.goea.[place].gov      Alcoholics anonymous  (AA): www.aa.org      Physical Activity: www.pawan.nih.gov/nd7kyld      Tobacco use: www.quitwithusla.org          Language Assistance Services     ATTENTION: Language assistance services are available, free of charge. Please call [phone].      ATENCIÓN: Si bolivar pastrana, tiene a ramirez disposición servicios gratuitos de asistencia lingüística. Llame al [phone].     CHÚ Ý: N?u b?n nói Ti?ng Vi?t, có các d?ch v? h? tr? ngôn ng? mi?n phí dành cho b?n. G?i s? [phone].         [facility] complies with applicable Federal civil rights laws and does not discriminate on the basis of race, color, national origin, age, disability, or sex.

## 2017-05-17 ENCOUNTER — TELEPHONE (OUTPATIENT)
Dept: NEUROLOGY | Facility: CLINIC | Age: 72
End: 2017-05-17

## 2017-05-17 NOTE — TELEPHONE ENCOUNTER
----- Message from Mariah Ryan MA sent at 5/16/2017  3:38 PM CDT -----  Please call patient regarding Calcifediol patient did not receive Rx and does not know if she needs to be on it of not.      Thanks,  Mariah

## 2017-05-17 NOTE — TELEPHONE ENCOUNTER
Called patient and informed her that we have sent the PA for the medication off to the company. Informed her that it is taking a while but she does need to take it once it comes in. Patient expressed understanding.

## 2017-05-18 ENCOUNTER — OFFICE VISIT (OUTPATIENT)
Dept: HEMATOLOGY/ONCOLOGY | Facility: CLINIC | Age: 72
End: 2017-05-18
Payer: MEDICARE

## 2017-05-18 ENCOUNTER — LAB VISIT (OUTPATIENT)
Dept: LAB | Facility: HOSPITAL | Age: 72
End: 2017-05-18
Attending: INTERNAL MEDICINE
Payer: MEDICARE

## 2017-05-18 VITALS
OXYGEN SATURATION: 97 % | DIASTOLIC BLOOD PRESSURE: 58 MMHG | TEMPERATURE: 99 F | SYSTOLIC BLOOD PRESSURE: 124 MMHG | HEART RATE: 95 BPM | RESPIRATION RATE: 18 BRPM | HEIGHT: 67 IN | BODY MASS INDEX: 25.64 KG/M2 | WEIGHT: 163.38 LBS

## 2017-05-18 DIAGNOSIS — D63.1 ANEMIA IN STAGE 4 CHRONIC KIDNEY DISEASE: ICD-10-CM

## 2017-05-18 DIAGNOSIS — D63.1 ANEMIA ASSOCIATED WITH CHRONIC RENAL FAILURE: Primary | ICD-10-CM

## 2017-05-18 DIAGNOSIS — N18.9 ANEMIA ASSOCIATED WITH CHRONIC RENAL FAILURE: Primary | ICD-10-CM

## 2017-05-18 DIAGNOSIS — N18.4 ANEMIA IN STAGE 4 CHRONIC KIDNEY DISEASE: ICD-10-CM

## 2017-05-18 DIAGNOSIS — D63.1 ANEMIA ASSOCIATED WITH CHRONIC RENAL FAILURE: ICD-10-CM

## 2017-05-18 DIAGNOSIS — N18.9 ANEMIA ASSOCIATED WITH CHRONIC RENAL FAILURE: ICD-10-CM

## 2017-05-18 LAB
BASOPHILS # BLD AUTO: 0.01 K/UL
BASOPHILS NFR BLD: 0.1 %
DIFFERENTIAL METHOD: ABNORMAL
EOSINOPHIL # BLD AUTO: 0 K/UL
EOSINOPHIL NFR BLD: 0.5 %
ERYTHROCYTE [DISTWIDTH] IN BLOOD BY AUTOMATED COUNT: 13 %
HCT VFR BLD AUTO: 28.7 %
HGB BLD-MCNC: 9.4 G/DL
LYMPHOCYTES # BLD AUTO: 1.1 K/UL
LYMPHOCYTES NFR BLD: 14.1 %
MCH RBC QN AUTO: 30 PG
MCHC RBC AUTO-ENTMCNC: 32.8 %
MCV RBC AUTO: 92 FL
MONOCYTES # BLD AUTO: 0.5 K/UL
MONOCYTES NFR BLD: 6.5 %
NEUTROPHILS # BLD AUTO: 5.9 K/UL
NEUTROPHILS NFR BLD: 78.8 %
PLATELET # BLD AUTO: 191 K/UL
PMV BLD AUTO: 9.6 FL
RBC # BLD AUTO: 3.13 M/UL
WBC # BLD AUTO: 7.44 K/UL

## 2017-05-18 PROCEDURE — 3078F DIAST BP <80 MM HG: CPT | Mod: S$GLB,,, | Performed by: INTERNAL MEDICINE

## 2017-05-18 PROCEDURE — 85025 COMPLETE CBC W/AUTO DIFF WBC: CPT | Mod: PO

## 2017-05-18 PROCEDURE — 99499 UNLISTED E&M SERVICE: CPT | Mod: S$GLB,,, | Performed by: INTERNAL MEDICINE

## 2017-05-18 PROCEDURE — 1126F AMNT PAIN NOTED NONE PRSNT: CPT | Mod: S$GLB,,, | Performed by: INTERNAL MEDICINE

## 2017-05-18 PROCEDURE — 99999 PR PBB SHADOW E&M-EST. PATIENT-LVL III: CPT | Mod: PBBFAC,,, | Performed by: INTERNAL MEDICINE

## 2017-05-18 PROCEDURE — 99214 OFFICE O/P EST MOD 30 MIN: CPT | Mod: S$GLB,,, | Performed by: INTERNAL MEDICINE

## 2017-05-18 PROCEDURE — 1157F ADVNC CARE PLAN IN RCRD: CPT | Mod: S$GLB,,, | Performed by: INTERNAL MEDICINE

## 2017-05-18 PROCEDURE — 1159F MED LIST DOCD IN RCRD: CPT | Mod: S$GLB,,, | Performed by: INTERNAL MEDICINE

## 2017-05-18 PROCEDURE — 36415 COLL VENOUS BLD VENIPUNCTURE: CPT | Mod: PO

## 2017-05-18 PROCEDURE — 3074F SYST BP LT 130 MM HG: CPT | Mod: S$GLB,,, | Performed by: INTERNAL MEDICINE

## 2017-05-18 PROCEDURE — 1160F RVW MEDS BY RX/DR IN RCRD: CPT | Mod: S$GLB,,, | Performed by: INTERNAL MEDICINE

## 2017-05-18 NOTE — MR AVS SNAPSHOT
[facility] Hemotology Oncology  [address] [facility] [address] [facility] [place] [address]  Phone: [phone]  Fax: [phone]                  Avril Monzon   2017 9:20 AM   Office Visit    Description:  Female : 1945   Provider:  Dudley Fletcher Jr., MD   Department:  [facility] Hemotolog Oncology           Diagnoses this Visit        Comments    Anemia associated with chronic renal failure    -  Primary     Anemia in stage 4 chronic kidney disease                To Do List           Future Appointments        Provider Department Dept Phone    2017 11:50 AM LABORATORY, [facility] [phone]    2017 3:20 PM Rose Germain MD [facility] Internal Medicine [phone]    2017 9:40 AM [facility] Interventional Pain [phone]    2017 10:30 AM SPECIMEN, [facility] [phone]    2017 10:40 AM LABORATORY, [facility] [phone]      Goals (5 Years of Data)     None      [facility]     [facility] Nurse Care Line -  Assistance  Unless otherwise directed by your provider, please contact [facility], our nurse care line that is available for  assistance.     Registered nurses in the [facility] provide: appointment scheduling, clinical advisement, health education, and other advisory services.  Call: [phone] (toll free)               Medications           Message regarding Medications     Verify the changes and/or additions to your medication regime listed below are the same as discussed with your clinician today.  If any of these changes or additions are incorrect, please notify your healthcare provider.             Verify that the below list of medications is an accurate representation of the medications you are currently taking.  If none reported, the list may be blank. If incorrect, please contact your healthcare provider. Carry this list with  "you in case of emergency.           Current Medications     amlodipine (NORVASC) 10 MG tablet TAKE ONE TABLET BY MOUTH ONCE DAILY    atorvastatin (LIPITOR) 80 MG tablet Take 80 mg by mouth once daily.    blood sugar diagnostic Strp 1 strip by Misc.(Non-Drug; Combo Route) route 3 (three) times daily. relion ultima    calcifediol 30 mcg Cs24 Take by mouth.    CMPD Flurbiprofen 10%, Cyclobenzaprine 2%, Gabapentin 6%, Lidocaine 2%, & Prilocaine 2% in Lipoderm Active-Max Apply 1 Pump (1.6 g total) topically 2 (two) times daily.    furosemide (LASIX) 20 MG tablet Take 1 tablet (20 mg total) by mouth 2 (two) times daily.    hydrALAZINE (APRESOLINE) 10 MG tablet Take 2 tablets (20 mg total) by mouth every 8 (eight) hours.    hydrocodone-acetaminophen 5-325mg (NORCO) 5-325 mg per tablet Take 1 tablet by mouth 2 (two) times daily as needed for Pain.    lancets (ACCU-CHEK SOFTCLIX LANCETS) Misc 1 lancet by Misc.(Non-Drug; Combo Route) route 3 (three) times daily.    lisinopril (PRINIVIL,ZESTRIL) 20 MG tablet Take 40 mg by mouth once daily.    NOVOLIN 70/30 100 unit/mL (70-30) injection INJECT SUBCUTANEOUSLY 30 UNITS IN THE MORNING AND INJECT 25 UNITS IN THE EVENING    sodium bicarbonate 325 MG tablet Take 1 tablet (325 mg total) by mouth 3 (three) times daily.           Clinical Reference Information           Your Vitals Were     BP Pulse Temp Resp Height Weight    124/58 95 98.5 °F (36.9 °C) (Oral) 18 5' 7" (1.702 m) 74.1 kg (163 lb 5.8 oz)    SpO2 BMI             97% 25.59 kg/m2         Blood Pressure          Most Recent Value    BP  (!)  124/58      Allergies as of 5/18/2017     Codeine    Darvon [Propoxyphene]      Immunizations Administered on Date of Encounter - 5/18/2017     None      Orders Placed During Today's Visit     Future Labs/Procedures Expected by Expires    CBC auto differential  5/18/2017 7/17/2018    CBC auto differential  5/18/2017 7/17/2018    Comprehensive metabolic panel  5/18/2017 7/17/2018    " Ferritin  5/18/2017 7/17/2018    Iron and TIBC  5/18/2017 7/17/2018    Lactate dehydrogenase  5/18/2017 7/17/2018    Reticulocytes  5/18/2017 7/17/2018      Language Assistance Services     ATTENTION: Language assistance services are available, free of charge. Please call [phone].      ATENCIÓN: Si habla español, tiene a ramirez disposición servicios gratuitos de asistencia lingüística. Llame al [phone].     CHÚ Ý: N?u b?n nói Ti?ng Vi?t, có các d?ch v? h? tr? ngôn ng? mi?n phí dành cho b?n. G?i s? [phone].         [facility] complies with applicable Federal civil rights laws and does not discriminate on the basis of race, color, national origin, age, disability, or sex.

## 2017-05-18 NOTE — PROGRESS NOTES
Hematology/Oncology Office Note    Reason for referral:  Normocytic anemia    CC:  Anemia    Referred by:  No ref. provider found    Diagnosis:  Normocytic anemia  CKD    Treatment:  Procrit      History of present illness:  71-year-old female with a history of diabetes mellitus, CKD, hypertension, and dyslipidemia who was been referred for progressive normocytic anemia.  Patient has significant CK D with baseline creatinine approximately 3.0.  Hemoglobin noted to be 9.3 on 6/14/16 and is trended down since 2012 when he was noted to be  11.3.  She reports fatigue, malaise, and cold intolerance, however, denies other significant symptoms.      I have reviewed and updated the HPI, ROS, PMHx, Social Hx, Family Hx and treatment history.      Today's visit:  Patient continues to have chronic stable fatigue.      Past Medical History:   Diagnosis Date    Anemia     Arthritis     back, hand, knees    Back pain     Cataract     CKD stage G4/A3, GFR 15 - 29 and albumin creatinine ratio >300 mg/g     GFR 40% Jan 2014 and 33% ub 3/2014 ([facility])    Diabetic retinopathy     DM (diabetes mellitus) type II controlled, neurological manifestation     GERD (gastroesophageal reflux disease)     Glaucoma     Hyperlipidemia     Hypertension     Peripheral neuropathy     Polyneuropathy     Proteinuria     >6 mo     Seizures     [facility] 1/2014 -dick CT NRI showed small vessel    Stroke 2012,2014    Tobacco dependence     Type 2 diabetes with peripheral circulatory disorder, controlled          Social History:  No tobacco, alcohol, or illicit drugs    Family History: family history includes Cancer in her maternal grandmother; Diabetes in her mother; Heart disease in her mother; Hyperlipidemia in her mother; Hypertension in her mother; Muscular dystrophy in her son.      HPI    Review of Systems   Constitutional: Positive for fatigue. Negative for activity change, appetite change, chills, diaphoresis, fever and unexpected weight  "change.   HENT: Negative for congestion, dental problem, drooling, ear discharge, ear pain, facial swelling, mouth sores, nosebleeds, rhinorrhea, sore throat, trouble swallowing and voice change.    Eyes: Negative.    Respiratory: Negative for apnea, cough, shortness of breath and stridor.    Cardiovascular: Negative for chest pain and palpitations.   Gastrointestinal: Negative for abdominal distention, abdominal pain, anal bleeding, blood in stool, constipation, diarrhea, nausea and vomiting.   Endocrine: Negative.    Genitourinary: Negative for difficulty urinating, dyspareunia, dysuria, flank pain, frequency, genital sores, hematuria, pelvic pain and vaginal bleeding.   Musculoskeletal: Negative for arthralgias, back pain, gait problem, joint swelling, myalgias and neck pain.   Skin: Negative for pallor, rash and wound.   Allergic/Immunologic: Negative.    Neurological: Negative for dizziness, syncope, facial asymmetry, speech difficulty, weakness and headaches.   Hematological: Negative for adenopathy. Does not bruise/bleed easily.   Psychiatric/Behavioral: Negative for agitation, behavioral problems, confusion, dysphoric mood and hallucinations. The patient is not nervous/anxious and is not hyperactive.        Objective:       Vitals:    05/18/17 0930   BP: (!) 124/58   Pulse: 95   Resp: 18   Temp: 98.5 °F (36.9 °C)   TempSrc: Oral   SpO2: 97%   Weight: 74.1 kg (163 lb 5.8 oz)   Height: 5' 7" (1.702 m)     Physical Exam   Constitutional: She is oriented to person, place, and time. She appears well-developed and well-nourished. No distress.   Well groomed   HENT:   Head: Normocephalic and atraumatic.   Right Ear: Tympanic membrane, external ear and ear canal normal.   Left Ear: Tympanic membrane, external ear and ear canal normal.   Nose: Nose normal. Right sinus exhibits no maxillary sinus tenderness and no frontal sinus tenderness. Left sinus exhibits no maxillary sinus tenderness and no frontal sinus " tenderness.   Mouth/Throat: Oropharynx is clear and moist and mucous membranes are normal. Mucous membranes are not pale and not dry. No oral lesions. Normal dentition. No oropharyngeal exudate.   Eyes: Conjunctivae, EOM and lids are normal. Pupils are equal, round, and reactive to light. Lids are everted and swept, no foreign bodies found. Right eye exhibits no discharge. Left eye exhibits no discharge. No scleral icterus.   Neck: Trachea normal and normal range of motion. Neck supple. No JVD present. No tracheal deviation present. No thyroid mass and no thyromegaly present.   No crepitus   Cardiovascular: Normal rate, regular rhythm, normal heart sounds, intact distal pulses and normal pulses.  Exam reveals no gallop and no friction rub.    No murmur heard.  Pulmonary/Chest: Effort normal and breath sounds normal. No respiratory distress. She has no wheezes. She has no rales. She exhibits no tenderness.   Abdominal: Soft. Normal appearance and bowel sounds are normal. She exhibits no distension and no mass. There is no hepatosplenomegaly. There is no tenderness. There is no rebound and no guarding.   Musculoskeletal: Normal range of motion. She exhibits no edema or tenderness.   Lymphadenopathy:        Head (right side): No submental and no submandibular adenopathy present.        Head (left side): No submental and no submandibular adenopathy present.     She has no cervical adenopathy.        Right cervical: No superficial cervical and no deep cervical adenopathy present.       Left cervical: No deep cervical adenopathy present.     She has no axillary adenopathy.        Right: No supraclavicular adenopathy present.        Left: No supraclavicular adenopathy present.   Neurological: She is alert and oriented to person, place, and time. No cranial nerve deficit. She exhibits normal muscle tone. Coordination normal.   Skin: Skin is warm and dry. No rash noted. She is not diaphoretic. No cyanosis or erythema. No  pallor. Nails show no clubbing.   Psychiatric: She has a normal mood and affect. Her behavior is normal. Judgment and thought content normal.       Lab Results   Component Value Date    WBC 7.44 05/18/2017    HGB 9.4 (L) 05/18/2017    HCT 28.7 (L) 05/18/2017    MCV 92 05/18/2017     05/18/2017     Lab Results   Component Value Date    CREATININE 3.7 (H) 05/08/2017    BUN 89 (H) 05/08/2017     05/08/2017    K 5.2 (H) 05/08/2017     05/08/2017    CO2 19 (L) 05/08/2017     Lab Results   Component Value Date    ALT 14 06/21/2016    AST 13 06/21/2016    ALKPHOS 97 06/21/2016    BILITOT 0.2 06/21/2016         Assessment:       71-year-old female with progressive normocytic anemia likely secondary to CKD.  Workup has also revealed a relative iron deficiency with a ferritin of 20 and 12% iron saturation.  Hemoglobin noted to be 9.8 on 5/8/2017.  We will repeat CBC today and plan to initiate treatment with Procrit provided hemoglobin mariya persistently less than 10.  She will follow-up in 4 weeks with repeat CBC.    Normocytic anemia: Secondary to CK D and relative iron deficiency:  --Initiate DIANE when hemoglobin falls below 10 persistently  --Repeat CBC in 4 weeks with plans to start Procrit if hemoglobin remains less than 10    CKD  --Initiate Procrit if hemoglobin remains less than 10 and iron stores are adequate

## 2017-05-19 DIAGNOSIS — E11.9 TYPE 2 DIABETES MELLITUS WITHOUT COMPLICATION: ICD-10-CM

## 2017-05-23 RX ORDER — ATORVASTATIN CALCIUM 80 MG/1
80 TABLET, FILM COATED ORAL DAILY
Qty: 30 TABLET | Refills: 11 | Status: SHIPPED | OUTPATIENT
Start: 2017-05-23 | End: 2018-12-09 | Stop reason: SDUPTHER

## 2017-05-23 NOTE — TELEPHONE ENCOUNTER
----- Message from Nikki Don sent at 5/23/2017 11:24 AM CDT -----  Contact: Pt   Pt states she is out of Lipitor and the pharmacy states they can't fill it until June 2nd. Pt can be reached at [phone] (tccq)

## 2017-05-25 ENCOUNTER — TELEPHONE (OUTPATIENT)
Dept: NEPHROLOGY | Facility: CLINIC | Age: 72
End: 2017-05-25

## 2017-06-05 ENCOUNTER — TELEPHONE (OUTPATIENT)
Dept: PAIN MEDICINE | Facility: CLINIC | Age: 72
End: 2017-06-05

## 2017-06-05 NOTE — TELEPHONE ENCOUNTER
Spoke with patient and let her know that Dr. Mcknight nor his PA ordered her a back brace.  She states she got an orange card through the mail to call an 1800 number.  I stressed that it didn't come from us.  She would like to make an appointment to see us about her back.  Appt made 6/12 at 1:20pm.  She acknowledged.

## 2017-06-05 NOTE — TELEPHONE ENCOUNTER
----- Message from Jo Wright MA sent at 6/2/2017  1:59 PM CDT -----  Contact: pt  Spoke with patient. Advised that Dr. Doe didn't order. She request to speak with [facility]'s nurse. Please advise  ----- Message -----  From: Celsa Whalen  Sent: 6/2/2017  12:00 PM  To: [facility] BURGER Staff    Pt is calling to let Dr know about her back brace,what the insurance is saying that there is place right across street of her that can get it from. The name [facility] [phone]...[phone] (home)

## 2017-06-07 ENCOUNTER — PATIENT OUTREACH (OUTPATIENT)
Dept: ADMINISTRATIVE | Facility: HOSPITAL | Age: 72
End: 2017-06-07

## 2017-06-07 NOTE — PROGRESS NOTES
Contacted patient. Changed patient labs to fasting and linked needed orders including lipid panel. Scheduled Dexa on same date as lab appointments for patient convenience. Patient understood and was agreeable.

## 2017-06-19 DIAGNOSIS — E11.40 TYPE 2 DIABETES MELLITUS WITH DIABETIC NEUROPATHY: ICD-10-CM

## 2017-06-21 RX ORDER — HUMAN INSULIN 100 [USP'U]/ML
INJECTION, SUSPENSION SUBCUTANEOUS
Refills: 0 | OUTPATIENT
Start: 2017-06-21

## 2017-06-21 RX ORDER — FUROSEMIDE 20 MG/1
TABLET ORAL
Qty: 60 TABLET | Refills: 9 | Status: SHIPPED | OUTPATIENT
Start: 2017-06-21 | End: 2018-08-07

## 2017-06-23 ENCOUNTER — INFUSION (OUTPATIENT)
Dept: INFUSION THERAPY | Facility: HOSPITAL | Age: 72
End: 2017-06-23
Attending: INTERNAL MEDICINE
Payer: MEDICARE

## 2017-06-23 ENCOUNTER — TELEPHONE (OUTPATIENT)
Dept: INTERNAL MEDICINE | Facility: CLINIC | Age: 72
End: 2017-06-23

## 2017-06-23 ENCOUNTER — OFFICE VISIT (OUTPATIENT)
Dept: HEMATOLOGY/ONCOLOGY | Facility: CLINIC | Age: 72
End: 2017-06-23
Payer: MEDICARE

## 2017-06-23 VITALS
BODY MASS INDEX: 26.52 KG/M2 | OXYGEN SATURATION: 98 % | TEMPERATURE: 99 F | DIASTOLIC BLOOD PRESSURE: 68 MMHG | SYSTOLIC BLOOD PRESSURE: 149 MMHG | HEART RATE: 79 BPM | HEIGHT: 65 IN | WEIGHT: 159.19 LBS

## 2017-06-23 DIAGNOSIS — D63.1 ANEMIA IN STAGE 4 CHRONIC KIDNEY DISEASE: ICD-10-CM

## 2017-06-23 DIAGNOSIS — N18.4 ANEMIA IN STAGE 4 CHRONIC KIDNEY DISEASE: ICD-10-CM

## 2017-06-23 DIAGNOSIS — D64.9 NORMOCYTIC NORMOCHROMIC ANEMIA: ICD-10-CM

## 2017-06-23 DIAGNOSIS — N18.4 ANEMIA IN STAGE 4 CHRONIC KIDNEY DISEASE: Primary | ICD-10-CM

## 2017-06-23 DIAGNOSIS — D63.1 ANEMIA IN STAGE 4 CHRONIC KIDNEY DISEASE: Primary | ICD-10-CM

## 2017-06-23 DIAGNOSIS — D63.1 ANEMIA ASSOCIATED WITH CHRONIC RENAL FAILURE: Primary | ICD-10-CM

## 2017-06-23 DIAGNOSIS — N18.9 ANEMIA ASSOCIATED WITH CHRONIC RENAL FAILURE: Primary | ICD-10-CM

## 2017-06-23 DIAGNOSIS — N18.4 CHRONIC KIDNEY DISEASE (CKD) STAGE G4/A3, SEVERELY DECREASED GLOMERULAR FILTRATION RATE (GFR) BETWEEN 15-29 ML/MIN/1.73 SQUARE METER AND ALBUMINURIA CREATININE RATIO GREATER THAN 300 MG/G: ICD-10-CM

## 2017-06-23 PROCEDURE — 96372 THER/PROPH/DIAG INJ SC/IM: CPT

## 2017-06-23 PROCEDURE — 99499 UNLISTED E&M SERVICE: CPT | Mod: S$GLB,,, | Performed by: INTERNAL MEDICINE

## 2017-06-23 PROCEDURE — 63600175 PHARM REV CODE 636 W HCPCS: Performed by: INTERNAL MEDICINE

## 2017-06-23 PROCEDURE — 1159F MED LIST DOCD IN RCRD: CPT | Mod: S$GLB,,, | Performed by: INTERNAL MEDICINE

## 2017-06-23 PROCEDURE — 99214 OFFICE O/P EST MOD 30 MIN: CPT | Mod: S$GLB,,, | Performed by: INTERNAL MEDICINE

## 2017-06-23 PROCEDURE — 1126F AMNT PAIN NOTED NONE PRSNT: CPT | Mod: S$GLB,,, | Performed by: INTERNAL MEDICINE

## 2017-06-23 PROCEDURE — 99999 PR PBB SHADOW E&M-EST. PATIENT-LVL III: CPT | Mod: PBBFAC,,, | Performed by: INTERNAL MEDICINE

## 2017-06-23 RX ADMIN — EPOETIN ALFA 10000 UNITS: 20000 SOLUTION INTRAVENOUS; SUBCUTANEOUS at 09:06

## 2017-06-23 NOTE — PATIENT INSTRUCTIONS
[facility]  [address]  [address]  [phone] phone     [phone] fax  Hours of Operation: Monday- Friday 8:00am- 5:00pm  After hours phone  [phone]  Hematology / Oncology Physicians on call      Dr. Doug Quintanilla    Please call with any concerns regarding your appointment today.            Epoetin Isreal injection  What is this medicine?  EPOETIN ISREAL (e BETH e tin AL fa) helps your body make more red blood cells. This medicine is used to treat anemia caused by chronic kidney failure, cancer chemotherapy, or HIV-therapy. It may also be used before surgery if you have anemia.  How should I use this medicine?  This medicine is for injection into a vein or under the skin. It is usually given by a health care professional in a hospital or clinic setting.  If you get this medicine at home, you will be taught how to prepare and give this medicine. Use exactly as directed. Take your medicine at regular intervals. Do not take your medicine more often than directed.  It is important that you put your used needles and syringes in a special sharps container. Do not put them in a trash can. If you do not have a sharps container, call your pharmacist or healthcare provider to get one.  Talk to your pediatrician regarding the use of this medicine in children. While this drug may be prescribed for selected conditions, precautions do apply.  What side effects may I notice from receiving this medicine?  Side effects that you should report to your doctor or health care professional as soon as possible:  · allergic reactions like skin rash, itching or hives, swelling of the face, lips, or tongue  · breathing problems  · changes in vision  · chest pain  · confusion, trouble speaking or understanding  · feeling faint or lightheaded, falls  · high blood pressure  · muscle aches or pains  · pain, swelling, warmth in the leg  · rapid  weight gain  · severe headaches  · sudden numbness or weakness of the face, arm or leg  · trouble walking, dizziness, loss of balance or coordination  · seizures (convulsions)  · swelling of the ankles, feet, hands  · unusually weak or tired  Side effects that usually do not require medical attention (report to your doctor or health care professional if they continue or are bothersome):  · diarrhea  · fever, chills (flu-like symptoms)  · headaches  · nausea, vomiting  · redness, stinging, or swelling at site where injected  What may interact with this medicine?  Do not take this medicine with any of the following medications:  · darbepoetin roman  What if I miss a dose?  If you miss a dose, take it as soon as you can. If it is almost time for your next dose, take only that dose. Do not take double or extra doses.  Where should I keep my medicine?  Keep out of the reach of children.  Store in a refrigerator between 2 and 8 degrees C (36 and 46 degrees F). Do not freeze or shake. Throw away any unused portion if using a single-dose vial. Multi-dose vials can be kept in the refrigerator for up to 21 days after the initial dose. Throw away unused medicine.  What should I tell my health care provider before I take this medicine?  They need to know if you have any of these conditions:  · blood clotting disorders  · cancer patient not on chemotherapy  · cystic fibrosis  · heart disease, such as angina or heart failure  · hemoglobin level of 12 g/dL or greater  · high blood pressure  · low levels of folate, iron, or vitamin B12  · seizures  · an unusual or allergic reaction to erythropoietin, albumin, benzyl alcohol, hamster proteins, other medicines, foods, dyes, or preservatives  · pregnant or trying to get pregnant  · breast-feeding  What should I watch for while using this medicine?  Visit your prescriber or health care professional for regular checks on your progress and for the needed blood tests and blood pressure  measurements. It is especially important for the doctor to make sure your hemoglobin level is in the desired range, to limit the risk of potential side effects and to give you the best benefit. Keep all appointments for any recommended tests. Check your blood pressure as directed. Ask your doctor what your blood pressure should be and when you should contact him or her.  As your body makes more red blood cells, you may need to take iron, folic acid, or vitamin B supplements. Ask your doctor or health care provider which products are right for you. If you have kidney disease continue dietary restrictions, even though this medication can make you feel better. Talk with your doctor or health care professional about the foods you eat and the vitamins that you take.  Date Last Reviewed:   NOTE:This sheet is a summary. It may not cover all possible information. If you have questions about this medicine, talk to your doctor, pharmacist, or health care provider. Copyright© 2016 [employer]

## 2017-06-23 NOTE — PROGRESS NOTES
Hematology/Oncology Office Note    Reason for referral:  Normocytic anemia    CC:  Anemia    Referred by:  No ref. provider found    Diagnosis:  Normocytic anemia  CKD    Treatment:  Procrit      History of present illness:  71-year-old female with a history of diabetes mellitus, CKD, hypertension, and dyslipidemia who was been referred for progressive normocytic anemia.  Patient has significant CK D with baseline creatinine approximately 3.0.  Hemoglobin noted to be 9.3 on 6/14/16 and is trended down since 2012 when he was noted to be  11.3.  She reports fatigue, malaise, and cold intolerance, however, denies other significant symptoms.      I have reviewed and updated the HPI, ROS, PMHx, Social Hx, Family Hx and treatment history.      Today's visit:  Patient presents today to initiate DIANE therapy.  He denies fever/chills, nausea/vomiting/diarrhea, night sweats, or weight loss.      Past Medical History:   Diagnosis Date    Anemia     Arthritis     back, hand, knees    Back pain     Cataract     CKD stage G4/A3, GFR 15 - 29 and albumin creatinine ratio >300 mg/g     GFR 40% Jan 2014 and 33% ub 3/2014 ([facility])    Diabetic retinopathy     DM (diabetes mellitus) type II controlled, neurological manifestation     GERD (gastroesophageal reflux disease)     Glaucoma     Hyperlipidemia     Hypertension     Peripheral neuropathy     Polyneuropathy     Proteinuria     >6 mo     Seizures     [facility] 1/2014 -dick CT NRI showed small vessel    Stroke 2012,2014    Tobacco dependence     Type 2 diabetes with peripheral circulatory disorder, controlled          Social History:  No tobacco, alcohol, or illicit drugs    Family History: family history includes Cancer in her maternal grandmother; Diabetes in her mother; Heart disease in her mother; Hyperlipidemia in her mother; Hypertension in her mother; Muscular dystrophy in her son.      HPI    Review of Systems   Constitutional: Positive for activity change and  "fatigue. Negative for appetite change, chills, diaphoresis, fever and unexpected weight change.   HENT: Negative for congestion, dental problem, drooling, ear discharge, ear pain, facial swelling, mouth sores, nosebleeds, rhinorrhea, sore throat, trouble swallowing and voice change.    Eyes: Negative.    Respiratory: Negative for apnea, cough, shortness of breath, wheezing and stridor.    Cardiovascular: Negative for chest pain and palpitations.   Gastrointestinal: Negative for abdominal distention, abdominal pain, anal bleeding, blood in stool, constipation, diarrhea, nausea and vomiting.   Endocrine: Negative.    Genitourinary: Negative for difficulty urinating, flank pain, frequency, genital sores, hematuria, menstrual problem, pelvic pain, urgency and vaginal bleeding.   Musculoskeletal: Negative for arthralgias, back pain, gait problem, joint swelling, myalgias and neck pain.   Skin: Negative for pallor, rash and wound.   Allergic/Immunologic: Negative.    Neurological: Negative for dizziness, syncope, facial asymmetry, speech difficulty, weakness, light-headedness, numbness and headaches.   Hematological: Negative for adenopathy. Does not bruise/bleed easily.   Psychiatric/Behavioral: Negative for agitation, behavioral problems, confusion, dysphoric mood and hallucinations. The patient is not nervous/anxious and is not hyperactive.        Objective:       Vitals:    06/23/17 0824   BP: (!) 149/68   Pulse: 79   Temp: 98.5 °F (36.9 °C)   TempSrc: Oral   SpO2: 98%   Weight: 72.2 kg (159 lb 2.8 oz)   Height: 5' 5" (1.651 m)     Physical Exam   Constitutional: She is oriented to person, place, and time. She appears well-developed and well-nourished. No distress.   Well groomed   HENT:   Head: Normocephalic and atraumatic.   Right Ear: Tympanic membrane, external ear and ear canal normal.   Left Ear: Tympanic membrane, external ear and ear canal normal.   Nose: Nose normal. Right sinus exhibits no maxillary sinus " tenderness and no frontal sinus tenderness. Left sinus exhibits no maxillary sinus tenderness and no frontal sinus tenderness.   Mouth/Throat: Oropharynx is clear and moist and mucous membranes are normal. Mucous membranes are not pale and not dry. No oral lesions. Normal dentition. No oropharyngeal exudate.   Eyes: Conjunctivae, EOM and lids are normal. Pupils are equal, round, and reactive to light. Lids are everted and swept, no foreign bodies found. Right eye exhibits no discharge. Left eye exhibits no discharge. No scleral icterus.   Neck: Trachea normal and normal range of motion. Neck supple. No JVD present. No tracheal deviation present. No thyroid mass and no thyromegaly present.   No crepitus   Cardiovascular: Normal rate, regular rhythm, intact distal pulses and normal pulses.  Exam reveals no gallop and no friction rub.    Murmur heard.   Systolic murmur is present with a grade of 2/6   Pulmonary/Chest: Effort normal and breath sounds normal. No respiratory distress. She has no wheezes. She has no rales. She exhibits no tenderness.   Abdominal: Soft. Normal appearance and bowel sounds are normal. She exhibits no distension and no mass. There is no hepatosplenomegaly. There is no tenderness. There is no rebound and no guarding.   Musculoskeletal: Normal range of motion. She exhibits no edema or tenderness.   Lymphadenopathy:        Head (right side): No submental and no submandibular adenopathy present.        Head (left side): No submental and no submandibular adenopathy present.     She has no cervical adenopathy.        Right cervical: No superficial cervical and no deep cervical adenopathy present.       Left cervical: No deep cervical adenopathy present.     She has no axillary adenopathy.        Right: No supraclavicular adenopathy present.        Left: No supraclavicular adenopathy present.   Neurological: She is alert and oriented to person, place, and time. No cranial nerve deficit. She exhibits  normal muscle tone. Coordination normal.   Skin: Skin is warm and dry. Capillary refill takes less than 2 seconds. No rash noted. She is not diaphoretic. No erythema. No pallor.   Psychiatric: She has a normal mood and affect. Her behavior is normal. Judgment and thought content normal.       Lab Results   Component Value Date    WBC 5.20 06/23/2017    WBC 5.20 06/23/2017    HGB 9.2 (L) 06/23/2017    HGB 9.2 (L) 06/23/2017    HCT 28.5 (L) 06/23/2017    HCT 28.5 (L) 06/23/2017    MCV 92 06/23/2017    MCV 92 06/23/2017     06/23/2017     06/23/2017     Lab Results   Component Value Date    CREATININE 3.2 (H) 06/23/2017    BUN 66 (H) 06/23/2017     06/23/2017    K 4.9 06/23/2017     (H) 06/23/2017    CO2 19 (L) 06/23/2017     Lab Results   Component Value Date    ALT 11 06/23/2017    AST 16 06/23/2017    ALKPHOS 70 06/23/2017    BILITOT 0.3 06/23/2017         Assessment:       71-year-old female with progressive normocytic anemia likely secondary to CKD.  Workup has also revealed a relative iron deficiency with a ferritin of 20 and 12% iron saturation.  Therefore, she received Feraheme 510 mg ×2 doses with appropriate response to treatment.  However hemoglobin has continued to trend down despite adequate iron stores, therefore, we will proceed with Procrit 10,000 units every 2 weeks and titrate dose to achieve hemoglobin goal of 10.      Normocytic anemia: Secondary to CK D and relative iron deficiency:  --Procrit 10,000 units every 2 weeks   --Titrate Procrit to achieve hemoglobin goal of 10   --Hold Procrit if hemoglobin greater than equal to 10.5

## 2017-06-23 NOTE — TELEPHONE ENCOUNTER
----- Message from Andrae Vasquez sent at 6/23/2017  3:12 PM CDT -----  Pt is requesting a call from nurse in regards to r/s her apt.        Please call pt back at [phone]

## 2017-07-03 ENCOUNTER — TELEPHONE (OUTPATIENT)
Dept: OPHTHALMOLOGY | Facility: CLINIC | Age: 72
End: 2017-07-03

## 2017-07-03 NOTE — TELEPHONE ENCOUNTER
----- Message from Sakina Sales sent at 7/3/2017 11:30 AM CDT -----  Contact: self [phone]  States that she wants to reschedule her appt with Dr Coello for an appt this month. Please call back at [phone]//thank you acc

## 2017-07-07 ENCOUNTER — INFUSION (OUTPATIENT)
Dept: INFUSION THERAPY | Facility: HOSPITAL | Age: 72
End: 2017-07-07
Attending: INTERNAL MEDICINE
Payer: MEDICARE

## 2017-07-07 VITALS
WEIGHT: 159.19 LBS | DIASTOLIC BLOOD PRESSURE: 61 MMHG | TEMPERATURE: 99 F | OXYGEN SATURATION: 98 % | HEIGHT: 65 IN | BODY MASS INDEX: 26.52 KG/M2 | HEART RATE: 69 BPM | SYSTOLIC BLOOD PRESSURE: 134 MMHG

## 2017-07-07 DIAGNOSIS — N18.4 ANEMIA IN STAGE 4 CHRONIC KIDNEY DISEASE: Primary | ICD-10-CM

## 2017-07-07 DIAGNOSIS — D63.1 ANEMIA IN STAGE 4 CHRONIC KIDNEY DISEASE: Primary | ICD-10-CM

## 2017-07-07 DIAGNOSIS — N18.4 CHRONIC KIDNEY DISEASE (CKD) STAGE G4/A3, SEVERELY DECREASED GLOMERULAR FILTRATION RATE (GFR) BETWEEN 15-29 ML/MIN/1.73 SQUARE METER AND ALBUMINURIA CREATININE RATIO GREATER THAN 300 MG/G: ICD-10-CM

## 2017-07-07 PROCEDURE — 63600175 PHARM REV CODE 636 W HCPCS: Performed by: INTERNAL MEDICINE

## 2017-07-07 PROCEDURE — 96372 THER/PROPH/DIAG INJ SC/IM: CPT

## 2017-07-07 RX ADMIN — EPOETIN ALFA 10000 UNITS: 20000 SOLUTION INTRAVENOUS; SUBCUTANEOUS at 10:07

## 2017-07-07 NOTE — PATIENT INSTRUCTIONS
[facility]  [address]  [phone] phone     [phone] fax  Hours of Operation: Monday- Friday 8:00am- 5:00pm  After hours phone  [phone]  Hematology / Oncology Physicians on call      Dr. Doug Quintanilla                      Please call with any concerns regarding your appointment today.

## 2017-07-11 ENCOUNTER — OFFICE VISIT (OUTPATIENT)
Dept: PAIN MEDICINE | Facility: CLINIC | Age: 72
End: 2017-07-11
Payer: MEDICARE

## 2017-07-11 VITALS
DIASTOLIC BLOOD PRESSURE: 68 MMHG | HEIGHT: 65 IN | SYSTOLIC BLOOD PRESSURE: 135 MMHG | RESPIRATION RATE: 16 BRPM | HEART RATE: 78 BPM | BODY MASS INDEX: 26.66 KG/M2 | WEIGHT: 160 LBS

## 2017-07-11 DIAGNOSIS — M47.817 SPONDYLOSIS OF LUMBOSACRAL REGION WITHOUT MYELOPATHY OR RADICULOPATHY: Primary | ICD-10-CM

## 2017-07-11 DIAGNOSIS — M48.061 LUMBAR FORAMINAL STENOSIS: ICD-10-CM

## 2017-07-11 DIAGNOSIS — M79.18 MYOFASCIAL PAIN: ICD-10-CM

## 2017-07-11 DIAGNOSIS — G62.9 PERIPHERAL POLYNEUROPATHY: ICD-10-CM

## 2017-07-11 DIAGNOSIS — M51.36 DDD (DEGENERATIVE DISC DISEASE), LUMBAR: ICD-10-CM

## 2017-07-11 PROCEDURE — 1159F MED LIST DOCD IN RCRD: CPT | Mod: S$GLB,,, | Performed by: PHYSICIAN ASSISTANT

## 2017-07-11 PROCEDURE — 1125F AMNT PAIN NOTED PAIN PRSNT: CPT | Mod: S$GLB,,, | Performed by: PHYSICIAN ASSISTANT

## 2017-07-11 PROCEDURE — 99499 UNLISTED E&M SERVICE: CPT | Mod: S$GLB,,, | Performed by: PHYSICIAN ASSISTANT

## 2017-07-11 PROCEDURE — 99214 OFFICE O/P EST MOD 30 MIN: CPT | Mod: S$GLB,,, | Performed by: PHYSICIAN ASSISTANT

## 2017-07-11 PROCEDURE — 99999 PR PBB SHADOW E&M-EST. PATIENT-LVL III: CPT | Mod: PBBFAC,,, | Performed by: PHYSICIAN ASSISTANT

## 2017-07-11 RX ORDER — TRAMADOL HYDROCHLORIDE 50 MG/1
50 TABLET ORAL 2 TIMES DAILY PRN
Qty: 60 TABLET | Refills: 0 | Status: SHIPPED | OUTPATIENT
Start: 2017-07-11 | End: 2017-08-10

## 2017-07-11 NOTE — PROGRESS NOTES
Subjective:   Chief Complaint: Avril Monzon is a 72 y.o. female who had concerns including Low-back Pain.    HPI:  Patient returns today for follow-up visit.  She complains of continued low back pain.  She has pain along multiple joints with the low back being the most bothersome. Pain is nonradiating, aching, constant, worse with activity.  Lower extremity strength preserved.  MRI shows L4/5 and L5/S1 facet disease and L4/5 DDD with left L4 NFS.    - duration of pain: pain for years   - timing: intermittent  - character: throbbing  - radiating, dermatomal: no radiation  - antecedent trauma, prior spinal surgery:  no prior surgery   - pertinent negatives: No fever, No chills, No weight loss, No bladder dysfunction, No bowel dysfunction, No saddle anesthesia  - pertinent positives: none    - medications, other therapies tried (physical therapy, injections):   >> Has previously undergone Physical Therapy with aquatherapy with some relief  >> Has not previously undergone spinal injection/s      IMAGING (reviewed on 7/11/2017):    MRI lumbar spine without contrast.01/28/16:  Standard multiplanar noncontrast MRI sequences of the lumbar spine.  The tip of the conus is at the L1 level. There is no abnormal signal in the cord.  T12-L1: Normal  L1-2: Normal  L2-3: Normal.  L3-4: Normal.  L4-5: Desiccation of the disk. Broad-based disk bulge. Bilateral degenerative changes of the facets. Narrowing of the lateral recesses with possible but not definite L4 nerve root impingement. Bilateral facet hypertrophy.  L5-S1: Normal signal in the disk. Mild degenerative changes of the facets.       10-22-15 XR LUMBAR:  Findings:  Overlying bowel and atherosclerotic vascular calcification combine to limit the study. Vertebral body height and alignment well maintained. Multilevel marginal spurring and facet arthropathy. Uniform loss of discoid at the L4 -- 5 level. No acute fracture or subluxation. No spondylolysis or spondylolisthesis.  "Pedicles and SI joints appear intact. Minimal degenerative change bilateral hips            Review of Systems   Constitution: Positive for malaise/fatigue and weight gain.        Tiredness when she's up mopping, walking etc... Resolves after sitting for a few seconds.    HENT: Positive for tinnitus.         Ringing in ears at times.    Eyes: Positive for blurred vision and vision loss in right eye.        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        Near syncope in July 2015  Respiratory: Positive for snoring.  She notes dyspnea on exertion.  Endocrine: Negative.    Hematologic/Lymphatic: Bruises/bleeds easily.   Skin: Positive for nail changes and itching.        Small lines in fingernails she has noticed        Itching in hands at times.    Musculoskeletal: Positive for arthritis, back pain, joint pain, muscle cramps, neck pain and stiffness.        Hip pain, Low Back Pain       Hip stiffness   Gastrointestinal: Positive for bloating, change in bowel habit, bowel incontinence, constipation and flatus.        Always had problems with bowels.        At times when she takes a stool softer she cannot make it to the bathroom in time    Genitourinary: Positive for flank pain, hesitancy, incomplete emptying and nocturia.   Neurological: Positive for excessive daytime sleepiness, loss of balance and seizures.        Seizure 2 years ago  Psychiatric/Behavioral:        Positive for memory loss.         Objective/ Physical:     Vitals:  /68 (BP Location: Right arm, Patient Position: Sitting, BP Method: Automatic)   Pulse 78   Resp 16   Ht 5' 5" (1.651 m)   Wt 72.6 kg (160 lb)   BMI 26.63 kg/m²    (reviewed on 7/11/2017)     General: alert and oriented   Gait: normal gait  Skin: No rashes, No discoloration, No obvious lesions  HEENT: EOMI  Respiratory: respirations nonlabored    Musculoskeletal:  - Any pain on flexion, extension, rotation:    >> pain on " extension and rotation    >> facet loading causes pain bilaterally  - Straight Leg Raise:   >> negative on the LEFT  >> negative on the RIGHT  - Any tenderness to palpation across lumbar paraspinal muscles, Sacroiliac joint/s, greater trochanteric bursae:    >> tenderness along lumbar paraspinals      Neuro:  - Lower Extremity Strength:   >> 5/5 throughout on the LEFT  >> 5/5 throughout on the RIGHT  - Lower Extremity Reflexes:  >> 2+ on the LEFT  >> 2+ on the RIGHT  - Sensory (sensation to light touch):  >> intact on the LEFT  >> intact on the RIGHT        Psych: Mood and affect appropriate        Assessment:   Lumbar Spondylosis  Lumbar Facet Joint Syndrome  Lumbar DDD  Chronic Pain Syndrome          Plan:   Patient returns for a follow-up. She complains of multiple joint pains, including facetogenic pain. MRI shows primarily facet disease, and she has L4/5 DDD and the potential for left L4 compression but no symptoms of this.    - We previously prescribed Norco 5mg BID PRN, which she never filled. She reports that the pharmacy was hesitant to fill due to her allergies (most likely codeine). Will try tramadol 50mg #60 for when she feels she needs something for pain.   - She never started PT because she is on a bad sleeping pattern and does not want to get out of bed for therapy. Will hold off on this because she is feeling better since getting epoetin roman injections for anemia.   - Lumbar MBB is an option, but she wants to wait on this because she is scared.   - LA  is empty.  RTC PRN. I discussed the risks, benefits, and alternatives to potential treatment options. All questions and concerns were fully addressed today in clinic. Dr. Mcknight was consulted regarding the patient plan and agrees.               >> Pain Disability Index:  5/16/2017 :: 41  7/11/2017 :: 14

## 2017-07-24 ENCOUNTER — TELEPHONE (OUTPATIENT)
Dept: NEPHROLOGY | Facility: CLINIC | Age: 72
End: 2017-07-24

## 2017-07-24 ENCOUNTER — INFUSION (OUTPATIENT)
Dept: INFUSION THERAPY | Facility: HOSPITAL | Age: 72
End: 2017-07-24
Attending: INTERNAL MEDICINE
Payer: MEDICARE

## 2017-07-24 ENCOUNTER — OFFICE VISIT (OUTPATIENT)
Dept: NEPHROLOGY | Facility: CLINIC | Age: 72
End: 2017-07-24
Payer: MEDICARE

## 2017-07-24 ENCOUNTER — OFFICE VISIT (OUTPATIENT)
Dept: HEMATOLOGY/ONCOLOGY | Facility: CLINIC | Age: 72
End: 2017-07-24
Payer: MEDICARE

## 2017-07-24 VITALS
DIASTOLIC BLOOD PRESSURE: 70 MMHG | WEIGHT: 161.63 LBS | BODY MASS INDEX: 25.97 KG/M2 | HEIGHT: 66 IN | SYSTOLIC BLOOD PRESSURE: 150 MMHG | HEART RATE: 74 BPM

## 2017-07-24 VITALS
DIASTOLIC BLOOD PRESSURE: 60 MMHG | TEMPERATURE: 98 F | WEIGHT: 160.94 LBS | SYSTOLIC BLOOD PRESSURE: 114 MMHG | RESPIRATION RATE: 18 BRPM | OXYGEN SATURATION: 98 % | BODY MASS INDEX: 26.81 KG/M2 | HEART RATE: 76 BPM | HEIGHT: 65 IN

## 2017-07-24 DIAGNOSIS — E87.20 METABOLIC ACIDEMIA: ICD-10-CM

## 2017-07-24 DIAGNOSIS — E55.9 VITAMIN D DEFICIENCY: ICD-10-CM

## 2017-07-24 DIAGNOSIS — D63.1 ANEMIA IN STAGE 4 CHRONIC KIDNEY DISEASE: ICD-10-CM

## 2017-07-24 DIAGNOSIS — D63.1 ANEMIA IN STAGE 4 CHRONIC KIDNEY DISEASE: Primary | ICD-10-CM

## 2017-07-24 DIAGNOSIS — R80.9 PROTEINURIA DUE TO TYPE 2 DIABETES MELLITUS: ICD-10-CM

## 2017-07-24 DIAGNOSIS — E87.5 HYPERKALEMIA: ICD-10-CM

## 2017-07-24 DIAGNOSIS — N18.4 CHRONIC KIDNEY DISEASE, STAGE IV (SEVERE): Primary | ICD-10-CM

## 2017-07-24 DIAGNOSIS — N18.4 ANEMIA IN STAGE 4 CHRONIC KIDNEY DISEASE: ICD-10-CM

## 2017-07-24 DIAGNOSIS — N18.4 ANEMIA OF CHRONIC RENAL FAILURE, STAGE 4 (SEVERE): ICD-10-CM

## 2017-07-24 DIAGNOSIS — N18.4 CHRONIC KIDNEY DISEASE (CKD) STAGE G4/A3, SEVERELY DECREASED GLOMERULAR FILTRATION RATE (GFR) BETWEEN 15-29 ML/MIN/1.73 SQUARE METER AND ALBUMINURIA CREATININE RATIO GREATER THAN 300 MG/G: ICD-10-CM

## 2017-07-24 DIAGNOSIS — E11.21 DIABETIC NEPHROPATHY ASSOCIATED WITH TYPE 2 DIABETES MELLITUS: ICD-10-CM

## 2017-07-24 DIAGNOSIS — E11.29 PROTEINURIA DUE TO TYPE 2 DIABETES MELLITUS: ICD-10-CM

## 2017-07-24 DIAGNOSIS — N18.4 ANEMIA IN STAGE 4 CHRONIC KIDNEY DISEASE: Primary | ICD-10-CM

## 2017-07-24 DIAGNOSIS — D63.1 ANEMIA OF CHRONIC RENAL FAILURE, STAGE 4 (SEVERE): ICD-10-CM

## 2017-07-24 DIAGNOSIS — N25.81 SECONDARY HYPERPARATHYROIDISM: ICD-10-CM

## 2017-07-24 DIAGNOSIS — D63.1 ANEMIA ASSOCIATED WITH CHRONIC RENAL FAILURE: Primary | ICD-10-CM

## 2017-07-24 DIAGNOSIS — I10 ESSENTIAL HYPERTENSION: ICD-10-CM

## 2017-07-24 DIAGNOSIS — N18.9 ANEMIA ASSOCIATED WITH CHRONIC RENAL FAILURE: Primary | ICD-10-CM

## 2017-07-24 PROCEDURE — 1159F MED LIST DOCD IN RCRD: CPT | Mod: S$GLB,,, | Performed by: INTERNAL MEDICINE

## 2017-07-24 PROCEDURE — 99499 UNLISTED E&M SERVICE: CPT | Mod: S$GLB,,, | Performed by: INTERNAL MEDICINE

## 2017-07-24 PROCEDURE — 99999 PR PBB SHADOW E&M-EST. PATIENT-LVL II: CPT | Mod: PBBFAC,,, | Performed by: INTERNAL MEDICINE

## 2017-07-24 PROCEDURE — 3044F HG A1C LEVEL LT 7.0%: CPT | Mod: S$GLB,,, | Performed by: INTERNAL MEDICINE

## 2017-07-24 PROCEDURE — 3066F NEPHROPATHY DOC TX: CPT | Mod: S$GLB,,, | Performed by: INTERNAL MEDICINE

## 2017-07-24 PROCEDURE — 99999 PR PBB SHADOW E&M-EST. PATIENT-LVL III: CPT | Mod: PBBFAC,,, | Performed by: INTERNAL MEDICINE

## 2017-07-24 PROCEDURE — 63600175 PHARM REV CODE 636 W HCPCS: Mod: PO | Performed by: INTERNAL MEDICINE

## 2017-07-24 PROCEDURE — 99214 OFFICE O/P EST MOD 30 MIN: CPT | Mod: S$GLB,,, | Performed by: INTERNAL MEDICINE

## 2017-07-24 PROCEDURE — 1126F AMNT PAIN NOTED NONE PRSNT: CPT | Mod: S$GLB,,, | Performed by: INTERNAL MEDICINE

## 2017-07-24 PROCEDURE — 96372 THER/PROPH/DIAG INJ SC/IM: CPT | Mod: PO

## 2017-07-24 RX ADMIN — ERYTHROPOIETIN 10000 UNITS: 10000 INJECTION, SOLUTION INTRAVENOUS; SUBCUTANEOUS at 10:07

## 2017-07-24 NOTE — PATIENT INSTRUCTIONS
[facility]  [address]  [phone] phone     [phone] fax  Hours of Operation: Monday- Friday 8:00am- 5:00pm  After hours phone  [phone]  Hematology / Oncology Physicians on call      Dr. Doug Quintanilla                      Please call with any concerns regarding your appointment today.

## 2017-07-24 NOTE — PROGRESS NOTES
Hematology/Oncology Office Note    Reason for referral:  Normocytic anemia    CC:  Anemia    Referred by:  No ref. provider found    Diagnosis:  Normocytic anemia  CKD    Treatment:  Procrit      History of present illness:  71-year-old female with a history of diabetes mellitus, CKD, hypertension, and dyslipidemia who was been referred for progressive normocytic anemia.  Patient has significant CK D with baseline creatinine approximately 3.0.  Hemoglobin noted to be 9.3 on 6/14/16 and is trended down since 2012 when he was noted to be  11.3.  She reports fatigue, malaise, and cold intolerance, however, denies other significant symptoms.      I have reviewed and updated the HPI, ROS, PMHx, Social Hx, Family Hx and treatment history.      Today's visit:  Patient presents today to initiate DIANE therapy.  He denies fever/chills, nausea/vomiting/diarrhea, night sweats, or weight loss.      Past Medical History:   Diagnosis Date    Anemia     Arthritis     back, hand, knees    Back pain     Cataract     CKD stage G4/A3, GFR 15 - 29 and albumin creatinine ratio >300 mg/g     GFR 40% Jan 2014 and 33% ub 3/2014 ([facility])    Diabetic retinopathy     DM (diabetes mellitus) type II controlled, neurological manifestation     GERD (gastroesophageal reflux disease)     Glaucoma     Hyperlipidemia     Hypertension     Peripheral neuropathy     Polyneuropathy     Proteinuria     >6 mo     Seizures     [facility] 1/2014 -dick CT NRI showed small vessel    Stroke 2012,2014    Tobacco dependence     Type 2 diabetes with peripheral circulatory disorder, controlled          Social History:  No tobacco, alcohol, or illicit drugs    Family History: family history includes Cancer in her maternal grandmother; Diabetes in her mother; Heart disease in her mother; Hyperlipidemia in her mother; Hypertension in her mother; Muscular dystrophy in her son.      HPI    Review of Systems   Constitutional: Positive for activity change and  "fatigue. Negative for appetite change, chills, diaphoresis, fever and unexpected weight change.   HENT: Negative for congestion, dental problem, drooling, ear discharge, ear pain, facial swelling, mouth sores, nosebleeds, rhinorrhea, sore throat, trouble swallowing and voice change.    Eyes: Negative.    Respiratory: Negative for apnea, cough, shortness of breath, wheezing and stridor.    Cardiovascular: Negative for chest pain and palpitations.   Gastrointestinal: Negative for abdominal distention, abdominal pain, anal bleeding, blood in stool, constipation, diarrhea, nausea and vomiting.   Endocrine: Negative.    Genitourinary: Negative for difficulty urinating, flank pain, frequency, genital sores, hematuria, menstrual problem, pelvic pain, urgency and vaginal bleeding.   Musculoskeletal: Negative for arthralgias, back pain, gait problem, joint swelling, myalgias and neck pain.   Skin: Negative for pallor, rash and wound.   Allergic/Immunologic: Negative.    Neurological: Negative for dizziness, syncope, facial asymmetry, speech difficulty, weakness, light-headedness, numbness and headaches.   Hematological: Negative for adenopathy. Does not bruise/bleed easily.   Psychiatric/Behavioral: Negative for agitation, behavioral problems, confusion, dysphoric mood and hallucinations. The patient is not nervous/anxious and is not hyperactive.        Objective:       Vitals:    07/24/17 1021   BP: 114/60   Pulse: 76   Resp: 18   Temp: 98.3 °F (36.8 °C)   TempSrc: Oral   SpO2: 98%   Weight: 73 kg (160 lb 15 oz)   Height: 5' 5" (1.651 m)     Physical Exam   Constitutional: She is oriented to person, place, and time. She appears well-developed and well-nourished. No distress.   Well groomed   HENT:   Head: Normocephalic and atraumatic.   Right Ear: Tympanic membrane, external ear and ear canal normal.   Left Ear: Tympanic membrane, external ear and ear canal normal.   Nose: Nose normal. Right sinus exhibits no maxillary " sinus tenderness and no frontal sinus tenderness. Left sinus exhibits no maxillary sinus tenderness and no frontal sinus tenderness.   Mouth/Throat: Oropharynx is clear and moist and mucous membranes are normal. Mucous membranes are not pale and not dry. No oral lesions. Normal dentition. No oropharyngeal exudate.   Eyes: Conjunctivae, EOM and lids are normal. Pupils are equal, round, and reactive to light. Lids are everted and swept, no foreign bodies found. Right eye exhibits no discharge. Left eye exhibits no discharge. No scleral icterus.   Neck: Trachea normal and normal range of motion. Neck supple. No JVD present. No tracheal deviation present. No thyroid mass and no thyromegaly present.   No crepitus   Cardiovascular: Normal rate, regular rhythm, intact distal pulses and normal pulses.  Exam reveals no gallop and no friction rub.    Murmur heard.   Systolic murmur is present with a grade of 2/6   Pulmonary/Chest: Effort normal and breath sounds normal. No respiratory distress. She has no wheezes. She has no rales. She exhibits no tenderness.   Abdominal: Soft. Normal appearance and bowel sounds are normal. She exhibits no distension and no mass. There is no hepatosplenomegaly. There is no tenderness. There is no rebound and no guarding.   Musculoskeletal: Normal range of motion. She exhibits no edema or tenderness.   Lymphadenopathy:        Head (right side): No submental and no submandibular adenopathy present.        Head (left side): No submental and no submandibular adenopathy present.     She has no cervical adenopathy.        Right cervical: No superficial cervical and no deep cervical adenopathy present.       Left cervical: No deep cervical adenopathy present.     She has no axillary adenopathy.        Right: No supraclavicular adenopathy present.        Left: No supraclavicular adenopathy present.   Neurological: She is alert and oriented to person, place, and time. No cranial nerve deficit. She  exhibits normal muscle tone. Coordination normal.   Skin: Skin is warm and dry. Capillary refill takes less than 2 seconds. No rash noted. She is not diaphoretic. No erythema. No pallor.   Psychiatric: She has a normal mood and affect. Her behavior is normal. Judgment and thought content normal.       Lab Results   Component Value Date    WBC 5.61 07/24/2017    HGB 10.1 (L) 07/24/2017    HCT 30.5 (L) 07/24/2017    MCV 90 07/24/2017     07/24/2017     Lab Results   Component Value Date    CREATININE 2.9 (H) 07/24/2017    BUN 56 (H) 07/24/2017     07/24/2017    K 4.7 07/24/2017     07/24/2017    CO2 25 07/24/2017     Lab Results   Component Value Date    ALT 22 07/24/2017    AST 22 07/24/2017    ALKPHOS 97 07/24/2017    BILITOT 0.2 07/24/2017         Assessment:       71-year-old female with progressive normocytic anemia likely secondary to CKD.  Workup has also revealed a relative iron deficiency with a ferritin of 20 and 12% iron saturation.  Therefore, she received Feraheme 510 mg ×2 doses with appropriate response to treatment.  However hemoglobin has continued to trend down despite adequate iron stores, therefore, we will proceed with Procrit 10,000 units every 2 weeks.  She is responded appropriately to Procrit with hemoglobin of 10.1 noted today.  We will continue Procrit every 2 weeks and she will follow-up in 1 month with repeat CBC.      Normocytic anemia: Secondary to CK D and relative iron deficiency:  --Procrit 10,000 units every 2 weeks   --Follow-up in 4 weeks with repeat CBC  --Titrate Procrit to achieve hemoglobin goal of 10   --Hold Procrit if hemoglobin greater than equal to 10.5

## 2017-07-24 NOTE — TELEPHONE ENCOUNTER
----- Message from Melissa Don sent at 7/24/2017  8:58 AM CDT -----  Contact: pt  The pt cant maker her appt this morning and wants to be worked in before 8/24, the pt can be reached at [phone]///[id number]

## 2017-07-24 NOTE — PROGRESS NOTES
"Subjective:       Patient ID: Avril Monzon is a 72 y.o. Black or  female who presents for re-evaluation of progression of Chronic Kidney Disease and Follow-up    HPI     Patient is a 71 YO AAF with diabetic nephropathy for many years. Now has CKD-IV and Hx of retinopathy. Comes for follow up as a new patient to me     Has seen dr. Hanson in the past     No labs or visit this year    Review of Systems   Constitutional: Negative for activity change, appetite change, chills, diaphoresis, fatigue, fever and unexpected weight change.   HENT: Negative for congestion, dental problem, drooling, postnasal drip, rhinorrhea and voice change.    Eyes: Negative for discharge.   Respiratory: Negative for apnea, cough, choking, chest tightness, shortness of breath, wheezing and stridor.    Cardiovascular: Negative for chest pain, palpitations and leg swelling.   Gastrointestinal: Negative for abdominal distention, blood in stool, constipation, diarrhea, nausea, rectal pain and vomiting.   Endocrine: Negative for cold intolerance, heat intolerance, polydipsia and polyuria.   Genitourinary: Negative for decreased urine volume, difficulty urinating, dysuria, enuresis, flank pain, frequency, hematuria and urgency.   Musculoskeletal: Negative for arthralgias, back pain, gait problem and joint swelling.   Skin: Negative for rash.   Allergic/Immunologic: Negative for food allergies and immunocompromised state.   Neurological: Negative for dizziness, tremors, syncope, numbness and headaches.   Hematological: Does not bruise/bleed easily.   Psychiatric/Behavioral: Negative for agitation, behavioral problems and self-injury. The patient is not nervous/anxious and is not hyperactive.    All other systems reviewed and are negative.      Objective:   BP (!) 150/70   Pulse 74   Ht 5' 6" (1.676 m)   Wt 73.3 kg (161 lb 9.6 oz)   BMI 26.08 kg/m²      Physical Exam   Constitutional: She is oriented to person, place, and time. " No distress.   HENT:   Head: Normocephalic and atraumatic.   Nose: Nose normal.   Eyes: Conjunctivae and EOM are normal. Pupils are equal, round, and reactive to light.   Neck: Normal range of motion. No JVD present. No tracheal deviation present. No thyromegaly present.   Cardiovascular: Normal rate, regular rhythm, normal heart sounds and intact distal pulses.  Exam reveals no gallop and no friction rub.    No murmur heard.  Pulmonary/Chest: Effort normal and breath sounds normal. No respiratory distress. She has no wheezes. She has no rales. She exhibits no tenderness.   Abdominal: Soft. Bowel sounds are normal. She exhibits no distension and no mass. There is no tenderness. No hernia.   Musculoskeletal: Normal range of motion. She exhibits no edema, tenderness or deformity.   Neurological: She is alert and oriented to person, place, and time. She has normal reflexes. She displays normal reflexes. No cranial nerve deficit. She exhibits normal muscle tone. Coordination normal.   Skin: Skin is warm. She is not diaphoretic. No erythema. There is pallor.   Psychiatric: She has a normal mood and affect. Her behavior is normal. Judgment and thought content normal.   Nursing note and vitals reviewed.        Lab Results   Component Value Date    CREATININE 2.9 (H) 07/24/2017    BUN 56 (H) 07/24/2017     07/24/2017    K 4.7 07/24/2017     07/24/2017    CO2 25 07/24/2017     Lab Results   Component Value Date    WBC 5.61 07/24/2017    HGB 10.1 (L) 07/24/2017    HCT 30.5 (L) 07/24/2017    MCV 90 07/24/2017     07/24/2017     Lab Results   Component Value Date    .0 (H) 05/08/2017    CALCIUM 9.3 07/24/2017    PHOS 4.4 05/08/2017     Vitamin D level is 9 checked on 5/8/17    Assessment:    )    1. Chronic kidney disease, stage IV (severe)    2. Essential hypertension    3. Secondary hyperparathyroidism    4. Anemia of chronic renal failure, stage 4 (severe)    5. Proteinuria due to type 2 diabetes  mellitus    6. Diabetic nephropathy associated with type 2 diabetes mellitus    7. Metabolic acidemia    8. Hyperkalemia    9. Vitamin D deficiency        Plan:         1. Chronic kidney Disease stage IV: GFR 16 % cystatin C; avoid NSAids; patient's kidney function is about 15-20 percent.  She will need education class on follow-up.    2. HPT with severe vitamin D deficiency: Start on vitamin D  Rayaldee 30 microgram daily.      3. Anemia: doing well with procrit in heme dept     4. HTN : controlled     5. Protienuria: check urine today     6:Acidosis: Add sodium bicarbonate    7: LBP: MRI reviewed : refer to chiropracter. Much better     8.  Hyperkalemia: much better     9. Tonic water for leg cramps       follow up in 2 months       Kim Doe MD

## 2017-08-02 DIAGNOSIS — E11.40 TYPE 2 DIABETES MELLITUS WITH DIABETIC NEUROPATHY: ICD-10-CM

## 2017-08-02 RX ORDER — HUMAN INSULIN 100 [USP'U]/ML
INJECTION, SUSPENSION SUBCUTANEOUS
Refills: 0 | OUTPATIENT
Start: 2017-08-02

## 2017-08-07 ENCOUNTER — INFUSION (OUTPATIENT)
Dept: INFUSION THERAPY | Facility: HOSPITAL | Age: 72
End: 2017-08-07
Attending: INTERNAL MEDICINE
Payer: MEDICARE

## 2017-08-07 VITALS — RESPIRATION RATE: 18 BRPM | HEART RATE: 78 BPM | SYSTOLIC BLOOD PRESSURE: 138 MMHG | DIASTOLIC BLOOD PRESSURE: 71 MMHG

## 2017-08-07 DIAGNOSIS — D63.1 ANEMIA IN STAGE 4 CHRONIC KIDNEY DISEASE: Primary | ICD-10-CM

## 2017-08-07 DIAGNOSIS — N18.4 CHRONIC KIDNEY DISEASE (CKD) STAGE G4/A3, SEVERELY DECREASED GLOMERULAR FILTRATION RATE (GFR) BETWEEN 15-29 ML/MIN/1.73 SQUARE METER AND ALBUMINURIA CREATININE RATIO GREATER THAN 300 MG/G: ICD-10-CM

## 2017-08-07 DIAGNOSIS — N18.4 ANEMIA IN STAGE 4 CHRONIC KIDNEY DISEASE: Primary | ICD-10-CM

## 2017-08-07 PROCEDURE — 63600175 PHARM REV CODE 636 W HCPCS: Mod: PO | Performed by: INTERNAL MEDICINE

## 2017-08-07 PROCEDURE — 96372 THER/PROPH/DIAG INJ SC/IM: CPT | Mod: PO

## 2017-08-07 RX ADMIN — ERYTHROPOIETIN 10000 UNITS: 10000 INJECTION, SOLUTION INTRAVENOUS; SUBCUTANEOUS at 10:08

## 2017-08-21 ENCOUNTER — LAB VISIT (OUTPATIENT)
Dept: LAB | Facility: HOSPITAL | Age: 72
End: 2017-08-21
Attending: INTERNAL MEDICINE
Payer: MEDICARE

## 2017-08-21 ENCOUNTER — INFUSION (OUTPATIENT)
Dept: INFUSION THERAPY | Facility: HOSPITAL | Age: 72
End: 2017-08-21
Attending: INTERNAL MEDICINE
Payer: MEDICARE

## 2017-08-21 ENCOUNTER — OFFICE VISIT (OUTPATIENT)
Dept: HEMATOLOGY/ONCOLOGY | Facility: CLINIC | Age: 72
End: 2017-08-21
Payer: MEDICARE

## 2017-08-21 VITALS
WEIGHT: 155.63 LBS | SYSTOLIC BLOOD PRESSURE: 139 MMHG | HEART RATE: 74 BPM | OXYGEN SATURATION: 98 % | HEIGHT: 66 IN | DIASTOLIC BLOOD PRESSURE: 81 MMHG | TEMPERATURE: 99 F | BODY MASS INDEX: 25.01 KG/M2 | RESPIRATION RATE: 18 BRPM

## 2017-08-21 DIAGNOSIS — N18.4 ANEMIA IN STAGE 4 CHRONIC KIDNEY DISEASE: ICD-10-CM

## 2017-08-21 DIAGNOSIS — N18.4 ANEMIA IN STAGE 4 CHRONIC KIDNEY DISEASE: Primary | ICD-10-CM

## 2017-08-21 DIAGNOSIS — N18.4 CHRONIC KIDNEY DISEASE (CKD) STAGE G4/A3, SEVERELY DECREASED GLOMERULAR FILTRATION RATE (GFR) BETWEEN 15-29 ML/MIN/1.73 SQUARE METER AND ALBUMINURIA CREATININE RATIO GREATER THAN 300 MG/G: ICD-10-CM

## 2017-08-21 DIAGNOSIS — N18.9 ANEMIA ASSOCIATED WITH CHRONIC RENAL FAILURE: ICD-10-CM

## 2017-08-21 DIAGNOSIS — D63.1 ANEMIA IN STAGE 4 CHRONIC KIDNEY DISEASE: ICD-10-CM

## 2017-08-21 DIAGNOSIS — N18.9 ANEMIA ASSOCIATED WITH CHRONIC RENAL FAILURE: Primary | ICD-10-CM

## 2017-08-21 DIAGNOSIS — D63.1 ANEMIA IN STAGE 4 CHRONIC KIDNEY DISEASE: Primary | ICD-10-CM

## 2017-08-21 DIAGNOSIS — D63.1 ANEMIA ASSOCIATED WITH CHRONIC RENAL FAILURE: Primary | ICD-10-CM

## 2017-08-21 DIAGNOSIS — D63.1 ANEMIA ASSOCIATED WITH CHRONIC RENAL FAILURE: ICD-10-CM

## 2017-08-21 LAB
BASOPHILS # BLD AUTO: 0 K/UL
BASOPHILS NFR BLD: 0 %
DIFFERENTIAL METHOD: ABNORMAL
EOSINOPHIL # BLD AUTO: 0.1 K/UL
EOSINOPHIL NFR BLD: 1.3 %
ERYTHROCYTE [DISTWIDTH] IN BLOOD BY AUTOMATED COUNT: 13.3 %
HCT VFR BLD AUTO: 31.8 %
HGB BLD-MCNC: 10.2 G/DL
LYMPHOCYTES # BLD AUTO: 0.9 K/UL
LYMPHOCYTES NFR BLD: 17.6 %
MCH RBC QN AUTO: 28.6 PG
MCHC RBC AUTO-ENTMCNC: 32.1 G/DL
MCV RBC AUTO: 89 FL
MONOCYTES # BLD AUTO: 0.3 K/UL
MONOCYTES NFR BLD: 6.3 %
NEUTROPHILS # BLD AUTO: 3.9 K/UL
NEUTROPHILS NFR BLD: 74.8 %
PLATELET # BLD AUTO: 197 K/UL
PMV BLD AUTO: 9.4 FL
RBC # BLD AUTO: 3.57 M/UL
WBC # BLD AUTO: 5.24 K/UL

## 2017-08-21 PROCEDURE — 3075F SYST BP GE 130 - 139MM HG: CPT | Mod: S$GLB,,, | Performed by: INTERNAL MEDICINE

## 2017-08-21 PROCEDURE — 1125F AMNT PAIN NOTED PAIN PRSNT: CPT | Mod: S$GLB,,, | Performed by: INTERNAL MEDICINE

## 2017-08-21 PROCEDURE — 63600175 PHARM REV CODE 636 W HCPCS: Performed by: INTERNAL MEDICINE

## 2017-08-21 PROCEDURE — 99999 PR PBB SHADOW E&M-EST. PATIENT-LVL III: CPT | Mod: PBBFAC,,, | Performed by: INTERNAL MEDICINE

## 2017-08-21 PROCEDURE — 99214 OFFICE O/P EST MOD 30 MIN: CPT | Mod: S$GLB,,, | Performed by: INTERNAL MEDICINE

## 2017-08-21 PROCEDURE — 3008F BODY MASS INDEX DOCD: CPT | Mod: S$GLB,,, | Performed by: INTERNAL MEDICINE

## 2017-08-21 PROCEDURE — 1159F MED LIST DOCD IN RCRD: CPT | Mod: S$GLB,,, | Performed by: INTERNAL MEDICINE

## 2017-08-21 PROCEDURE — 99499 UNLISTED E&M SERVICE: CPT | Mod: S$GLB,,, | Performed by: INTERNAL MEDICINE

## 2017-08-21 PROCEDURE — 3079F DIAST BP 80-89 MM HG: CPT | Mod: S$GLB,,, | Performed by: INTERNAL MEDICINE

## 2017-08-21 PROCEDURE — 96372 THER/PROPH/DIAG INJ SC/IM: CPT

## 2017-08-21 RX ADMIN — EPOETIN ALFA 10000 UNITS: 20000 SOLUTION INTRAVENOUS; SUBCUTANEOUS at 12:08

## 2017-08-21 NOTE — PATIENT INSTRUCTIONS
[facility]  [address]  [phone] phone     [phone] fax  Hours of Operation: Monday- Friday 8:00am- 5:00pm  After hours phone  [phone]  Hematology / Oncology Physicians on call      Dr. Doug Fletcher                        Please call with any concerns regarding your appointment today.

## 2017-08-22 ENCOUNTER — OFFICE VISIT (OUTPATIENT)
Dept: OPHTHALMOLOGY | Facility: CLINIC | Age: 72
End: 2017-08-22
Payer: MEDICARE

## 2017-08-22 DIAGNOSIS — E11.3553 TYPE 2 DIABETES MELLITUS WITH STABLE PROLIFERATIVE RETINOPATHY OF BOTH EYES, WITH LONG-TERM CURRENT USE OF INSULIN: Primary | ICD-10-CM

## 2017-08-22 DIAGNOSIS — H43.22 ASTEROID HYALOSIS OF LEFT EYE: ICD-10-CM

## 2017-08-22 DIAGNOSIS — H25.11 CATARACTA BRUNESCENS OF RIGHT EYE: ICD-10-CM

## 2017-08-22 DIAGNOSIS — Z79.4 TYPE 2 DIABETES MELLITUS WITH STABLE PROLIFERATIVE RETINOPATHY OF BOTH EYES, WITH LONG-TERM CURRENT USE OF INSULIN: Primary | ICD-10-CM

## 2017-08-22 PROCEDURE — 99499 UNLISTED E&M SERVICE: CPT | Mod: S$GLB,,, | Performed by: OPHTHALMOLOGY

## 2017-08-22 PROCEDURE — 99999 PR PBB SHADOW E&M-EST. PATIENT-LVL II: CPT | Mod: PBBFAC,,, | Performed by: OPHTHALMOLOGY

## 2017-08-22 PROCEDURE — 92014 COMPRE OPH EXAM EST PT 1/>: CPT | Mod: S$GLB,,, | Performed by: OPHTHALMOLOGY

## 2017-08-22 PROCEDURE — 92134 CPTRZ OPH DX IMG PST SGM RTA: CPT | Mod: S$GLB,,, | Performed by: OPHTHALMOLOGY

## 2017-08-22 NOTE — PROGRESS NOTES
"    ===============================  08/22/2017   Avril Monzon,   72 y.o. female   Last visit [facility]: :2/28/2017   Last visit eye dept. 2/28/2017  [facility]:  Corrected distance visual acuity was NLP in the right eye and 20/50 in the left eye.  Tonometry     Tonometry (Applanation, 10:22 AM)       Right Left    Pressure 38 15              Wearing Rx     Wearing Rx       Sphere Cylinder Axis Add    Right +1.25 +0.25 135 +3.50    Left +0.75 +0.50 138 +3.50    Type:  PAL              Manifest Refraction     Manifest Refraction       Sphere Cylinder Axis Dist [facility]    Right        Left +1.25 +0.50 138 20/40              Chief Complaint   Patient presents with    pdr     6 mos pdr rosalia        HPI     pdr    Additional comments: 6 mos pdr rosalia           Comments   Noncompliant with visits    1.) DM SINCE 1995  PDR  S/P LASER AND INJECTIONS OS(DR BOX AND AT [facility])  2.) NLP OD SINCE BIRTH  3.) DENSE CAT OD / 2+NS OS  4.) Asteroid Hyalosis    EYLEA OS 1/4/17       Last edited by JOSE Alegre on 8/22/2017 10:07 AM. (History)      Read Studies: y  Vitalsy  ________________  8/22/2017  Problem List Items Addressed This Visit        Eye/Vision problems    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, with long-term current use of insulin - Primary    Relevant Orders    Posterior Segment OCT Retina-Both eyes    Cataracta brunescens of right eye      Other Visit Diagnoses     Asteroid hyalosis of left eye            NLP OD since Birth  OS.  No dr os  3 astrroid - impairs view     rec road test  od no p[ain    .Not driving, could not pass vision at [employer] "I just want my vision good enough so I can drive"  rtc 6  Months       ===========================    "

## 2017-08-25 NOTE — PROGRESS NOTES
Hematology/Oncology Office Note    Reason for referral:  Normocytic anemia    CC:  Anemia    Referred by:  No ref. provider found    Diagnosis:  Normocytic anemia  CKD    Treatment:  Procrit 10,000 units biweekly      History of present illness:  71-year-old female with a history of diabetes mellitus, CKD, hypertension, and dyslipidemia who was been referred for progressive normocytic anemia.  Patient has significant CK D with baseline creatinine approximately 3.0.  Hemoglobin noted to be 9.3 on 6/14/16 and is trended down since 2012 when he was noted to be  11.3.  She reports fatigue, malaise, and cold intolerance, however, denies other significant symptoms.      I have reviewed and updated the HPI, ROS, PMHx, Social Hx, Family Hx and treatment history.      Today's visit:  Patient is without complaints today.  She remains on Procrit 10,000 units every 2 weeks.    Past Medical History:   Diagnosis Date    Anemia     Arthritis     back, hand, knees    Back pain     Cataract     CKD stage G4/A3, GFR 15 - 29 and albumin creatinine ratio >300 mg/g     GFR 40% Jan 2014 and 33% ub 3/2014 ([facility])    Diabetic retinopathy     DM (diabetes mellitus) type II controlled, neurological manifestation     GERD (gastroesophageal reflux disease)     Glaucoma     Hyperlipidemia     Hypertension     Peripheral neuropathy     Polyneuropathy     Proteinuria     >6 mo     Seizures     [facility] 1/2014 -dick CT NRI showed small vessel    Stroke 2012,2014    Tobacco dependence     Type 2 diabetes with peripheral circulatory disorder, controlled          Social History:  No tobacco, alcohol, or illicit drugs    Family History: family history includes Cancer in her maternal grandmother; Diabetes in her mother; Heart disease in her mother; Hyperlipidemia in her mother; Hypertension in her mother; Muscular dystrophy in her son.      HPI    Review of Systems   Constitutional: Positive for activity change and fatigue. Negative for  "appetite change, chills, diaphoresis, fever and unexpected weight change.   HENT: Negative for congestion, dental problem, drooling, ear discharge, ear pain, facial swelling, rhinorrhea and sore throat.    Eyes: Negative.    Respiratory: Negative for apnea, cough, choking, chest tightness, shortness of breath, wheezing and stridor.    Cardiovascular: Negative for chest pain, palpitations and leg swelling.   Gastrointestinal: Negative for abdominal distention, abdominal pain, anal bleeding, blood in stool, constipation, diarrhea, nausea and vomiting.   Endocrine: Negative.    Genitourinary: Negative for difficulty urinating, dyspareunia, dysuria, flank pain, frequency, genital sores, hematuria, menstrual problem, pelvic pain, urgency and vaginal bleeding.   Musculoskeletal: Negative for arthralgias, back pain, gait problem, joint swelling, myalgias and neck pain.   Skin: Negative for pallor, rash and wound.   Allergic/Immunologic: Negative.    Neurological: Negative for dizziness, numbness and headaches.   Hematological: Negative for adenopathy. Does not bruise/bleed easily.   Psychiatric/Behavioral: Negative for agitation, behavioral problems, confusion, dysphoric mood and hallucinations. The patient is not nervous/anxious and is not hyperactive.        Objective:       Vitals:    08/21/17 1219   BP: 139/81   Pulse: 74   Resp: 18   Temp: 98.6 °F (37 °C)   TempSrc: Oral   SpO2: 98%   Weight: 70.6 kg (155 lb 10.3 oz)   Height: 5' 6" (1.676 m)     Physical Exam   Constitutional: She is oriented to person, place, and time. She appears well-developed and well-nourished. No distress.   Well groomed   HENT:   Head: Normocephalic and atraumatic.   Right Ear: Tympanic membrane, external ear and ear canal normal.   Left Ear: Tympanic membrane, external ear and ear canal normal.   Nose: Nose normal. Right sinus exhibits no maxillary sinus tenderness and no frontal sinus tenderness. Left sinus exhibits no maxillary sinus " tenderness and no frontal sinus tenderness.   Mouth/Throat: Oropharynx is clear and moist and mucous membranes are normal. Mucous membranes are not pale and not dry. No oral lesions. Normal dentition. No oropharyngeal exudate.   Eyes: Conjunctivae, EOM and lids are normal. Pupils are equal, round, and reactive to light. Lids are everted and swept, no foreign bodies found. Right eye exhibits no discharge. Left eye exhibits no discharge. No scleral icterus.   Neck: Trachea normal and normal range of motion. Neck supple. No tracheal deviation present. No thyroid mass and no thyromegaly present.   No crepitus   Cardiovascular: Normal rate, regular rhythm, intact distal pulses and normal pulses.  Exam reveals no gallop and no friction rub.    Murmur heard.  Pulmonary/Chest: Effort normal and breath sounds normal. No respiratory distress. She has no wheezes. She has no rales. She exhibits no tenderness.   Abdominal: Soft. Normal appearance and bowel sounds are normal. She exhibits no distension. There is no hepatosplenomegaly. There is no tenderness. There is no guarding.   Musculoskeletal: Normal range of motion. She exhibits no edema or tenderness.   Lymphadenopathy:        Head (right side): No submental and no submandibular adenopathy present.        Head (left side): No submental and no submandibular adenopathy present.     She has no cervical adenopathy.        Right cervical: No superficial cervical and no deep cervical adenopathy present.       Left cervical: No deep cervical adenopathy present.     She has no axillary adenopathy.        Right: No supraclavicular adenopathy present.        Left: No supraclavicular adenopathy present.   Neurological: She is alert and oriented to person, place, and time. She displays normal reflexes. No cranial nerve deficit. She exhibits normal muscle tone. Coordination normal.   Skin: Skin is warm, dry and intact. Capillary refill takes less than 2 seconds. She is not diaphoretic.  No erythema. No pallor.   Psychiatric: She has a normal mood and affect. Her behavior is normal. Judgment and thought content normal.       Lab Results   Component Value Date    WBC 5.24 08/21/2017    HGB 10.2 (L) 08/21/2017    HCT 31.8 (L) 08/21/2017    MCV 89 08/21/2017     08/21/2017     Lab Results   Component Value Date    CREATININE 2.9 (H) 07/24/2017    BUN 56 (H) 07/24/2017     07/24/2017    K 4.7 07/24/2017     07/24/2017    CO2 25 07/24/2017     Lab Results   Component Value Date    ALT 22 07/24/2017    AST 22 07/24/2017    ALKPHOS 97 07/24/2017    BILITOT 0.2 07/24/2017         Assessment:       71-year-old female with progressive normocytic anemia likely secondary to CKD.  Workup has also revealed a relative iron deficiency with a ferritin of 20 and 12% iron saturation.  Therefore, she received Feraheme 510 mg ×2 doses with appropriate response to treatment.  However hemoglobin has continued to trend down despite adequate iron stores, therefore, we will proceeded with Procrit 10,000 units every 2 weeks.  She has responded appropriately to Procrit with hemoglobin of 10.2 noted today.    We will continue Procrit biweekly and have the patient follow-up in 1 month with repeat CBC.    Normocytic anemia: Secondary to CK D and relative iron deficiency:  --Continue Procrit 10,000 units every 2 weeks   --Titrate Procrit to achieve hemoglobin goal of 10   --Hold Procrit if hemoglobin greater than equal to 10.5  --Repeat CBC in 4 weeks

## 2017-09-05 ENCOUNTER — TELEPHONE (OUTPATIENT)
Dept: HEMATOLOGY/ONCOLOGY | Facility: CLINIC | Age: 72
End: 2017-09-05

## 2017-09-05 NOTE — TELEPHONE ENCOUNTER
----- Message from Muna Siddiqi sent at 9/5/2017 12:44 PM CDT -----  Contact: Pt   States she missed her injection from this morning and is calling to resched and can be reached at [phone]

## 2017-09-06 ENCOUNTER — INFUSION (OUTPATIENT)
Dept: INFUSION THERAPY | Facility: HOSPITAL | Age: 72
End: 2017-09-06
Attending: INTERNAL MEDICINE
Payer: MEDICARE

## 2017-09-06 VITALS
SYSTOLIC BLOOD PRESSURE: 150 MMHG | TEMPERATURE: 99 F | OXYGEN SATURATION: 98 % | DIASTOLIC BLOOD PRESSURE: 63 MMHG | RESPIRATION RATE: 16 BRPM | HEART RATE: 64 BPM

## 2017-09-06 DIAGNOSIS — D63.1 ANEMIA IN STAGE 4 CHRONIC KIDNEY DISEASE: Primary | ICD-10-CM

## 2017-09-06 DIAGNOSIS — N18.4 ANEMIA IN STAGE 4 CHRONIC KIDNEY DISEASE: Primary | ICD-10-CM

## 2017-09-06 DIAGNOSIS — N18.4 CHRONIC KIDNEY DISEASE (CKD) STAGE G4/A3, SEVERELY DECREASED GLOMERULAR FILTRATION RATE (GFR) BETWEEN 15-29 ML/MIN/1.73 SQUARE METER AND ALBUMINURIA CREATININE RATIO GREATER THAN 300 MG/G: ICD-10-CM

## 2017-09-06 PROCEDURE — 96372 THER/PROPH/DIAG INJ SC/IM: CPT

## 2017-09-06 PROCEDURE — 63600175 PHARM REV CODE 636 W HCPCS: Performed by: INTERNAL MEDICINE

## 2017-09-06 RX ADMIN — EPOETIN ALFA 10000 UNITS: 20000 SOLUTION INTRAVENOUS; SUBCUTANEOUS at 11:09

## 2017-09-06 NOTE — PATIENT INSTRUCTIONS
[facility]  [address]  [phone] phone     [phone] fax  Hours of Operation: Monday- Friday 8:00am- 5:00pm  After hours phone  [phone]  Hematology / Oncology Physicians on call      Dr. Doug Fletcher                        Please call with any concerns regarding your appointment today.

## 2017-09-18 ENCOUNTER — OFFICE VISIT (OUTPATIENT)
Dept: HEMATOLOGY/ONCOLOGY | Facility: CLINIC | Age: 72
End: 2017-09-18
Payer: MEDICARE

## 2017-09-18 ENCOUNTER — INFUSION (OUTPATIENT)
Dept: INFUSION THERAPY | Facility: HOSPITAL | Age: 72
End: 2017-09-18
Attending: INTERNAL MEDICINE
Payer: MEDICARE

## 2017-09-18 VITALS
DIASTOLIC BLOOD PRESSURE: 64 MMHG | HEART RATE: 67 BPM | TEMPERATURE: 98 F | SYSTOLIC BLOOD PRESSURE: 126 MMHG | OXYGEN SATURATION: 100 % | RESPIRATION RATE: 18 BRPM | WEIGHT: 164.88 LBS | BODY MASS INDEX: 26.5 KG/M2 | HEIGHT: 66 IN

## 2017-09-18 DIAGNOSIS — N18.9 ANEMIA ASSOCIATED WITH CHRONIC RENAL FAILURE: Primary | ICD-10-CM

## 2017-09-18 DIAGNOSIS — D63.1 ANEMIA ASSOCIATED WITH CHRONIC RENAL FAILURE: Primary | ICD-10-CM

## 2017-09-18 DIAGNOSIS — N18.4 CHRONIC KIDNEY DISEASE (CKD) STAGE G4/A3, SEVERELY DECREASED GLOMERULAR FILTRATION RATE (GFR) BETWEEN 15-29 ML/MIN/1.73 SQUARE METER AND ALBUMINURIA CREATININE RATIO GREATER THAN 300 MG/G: ICD-10-CM

## 2017-09-18 DIAGNOSIS — D63.1 ANEMIA IN STAGE 4 CHRONIC KIDNEY DISEASE: ICD-10-CM

## 2017-09-18 DIAGNOSIS — D63.1 ANEMIA IN STAGE 4 CHRONIC KIDNEY DISEASE: Primary | ICD-10-CM

## 2017-09-18 DIAGNOSIS — N18.4 ANEMIA IN STAGE 4 CHRONIC KIDNEY DISEASE: Primary | ICD-10-CM

## 2017-09-18 DIAGNOSIS — N18.4 ANEMIA IN STAGE 4 CHRONIC KIDNEY DISEASE: ICD-10-CM

## 2017-09-18 PROCEDURE — 1159F MED LIST DOCD IN RCRD: CPT | Mod: S$GLB,,, | Performed by: INTERNAL MEDICINE

## 2017-09-18 PROCEDURE — 1126F AMNT PAIN NOTED NONE PRSNT: CPT | Mod: S$GLB,,, | Performed by: INTERNAL MEDICINE

## 2017-09-18 PROCEDURE — 63600175 PHARM REV CODE 636 W HCPCS: Mod: EC,PO | Performed by: INTERNAL MEDICINE

## 2017-09-18 PROCEDURE — 99999 PR PBB SHADOW E&M-EST. PATIENT-LVL III: CPT | Mod: PBBFAC,,, | Performed by: INTERNAL MEDICINE

## 2017-09-18 PROCEDURE — 3074F SYST BP LT 130 MM HG: CPT | Mod: S$GLB,,, | Performed by: INTERNAL MEDICINE

## 2017-09-18 PROCEDURE — 96372 THER/PROPH/DIAG INJ SC/IM: CPT | Mod: PO

## 2017-09-18 PROCEDURE — 3008F BODY MASS INDEX DOCD: CPT | Mod: S$GLB,,, | Performed by: INTERNAL MEDICINE

## 2017-09-18 PROCEDURE — 3078F DIAST BP <80 MM HG: CPT | Mod: S$GLB,,, | Performed by: INTERNAL MEDICINE

## 2017-09-18 PROCEDURE — 99214 OFFICE O/P EST MOD 30 MIN: CPT | Mod: S$GLB,,, | Performed by: INTERNAL MEDICINE

## 2017-09-18 PROCEDURE — 99499 UNLISTED E&M SERVICE: CPT | Mod: S$GLB,,, | Performed by: INTERNAL MEDICINE

## 2017-09-18 RX ADMIN — ERYTHROPOIETIN 10000 UNITS: 10000 INJECTION, SOLUTION INTRAVENOUS; SUBCUTANEOUS at 11:09

## 2017-09-18 NOTE — PATIENT INSTRUCTIONS
[facility]  [address]  [phone] phone     [phone] fax  Hours of Operation: Monday- Friday 8:00am- 5:00pm  After hours phone  [phone]  Hematology / Oncology Physicians on call      Dr. Doug Fletcher                        Please call with any concerns regarding your appointment today.

## 2017-09-18 NOTE — PROGRESS NOTES
Hematology/Oncology Office Note    Reason for referral:  Normocytic anemia    CC:  Anemia    Referred by:  No ref. provider found    Diagnosis:  Normocytic anemia  CKD    Treatment:  Procrit 10,000 units biweekly      History of present illness:  71-year-old female with a history of diabetes mellitus, CKD, hypertension, and dyslipidemia who was been referred for progressive normocytic anemia.  Patient has significant CK D with baseline creatinine approximately 3.0.  Hemoglobin noted to be 9.3 on 6/14/16 and is trended down since 2012 when he was noted to be  11.3.  She reports fatigue, malaise, and cold intolerance, however, denies other significant symptoms.      I have reviewed and updated the HPI, ROS, PMHx, Social Hx, Family Hx and treatment history.      Today's visit:  Patient is without new complaints today.  She occasionally has epigastric bloating after eating which is intermittent in nature.    Past Medical History:   Diagnosis Date    Anemia     Arthritis     back, hand, knees    Back pain     Cataract     CKD stage G4/A3, GFR 15 - 29 and albumin creatinine ratio >300 mg/g     GFR 40% Jan 2014 and 33% ub 3/2014 ([facility])    Diabetic retinopathy     DM (diabetes mellitus) type II controlled, neurological manifestation     GERD (gastroesophageal reflux disease)     Glaucoma     Hyperlipidemia     Hypertension     Peripheral neuropathy     Polyneuropathy     Proteinuria     >6 mo     Seizures     [facility] 1/2014 -dick CT NRI showed small vessel    Stroke 2012,2014    Tobacco dependence     Type 2 diabetes with peripheral circulatory disorder, controlled          Social History:  No tobacco, alcohol, or illicit drugs    Family History: family history includes Cancer in her maternal grandmother; Diabetes in her mother; Heart disease in her mother; Hyperlipidemia in her mother; Hypertension in her mother; Muscular dystrophy in her son.      HPI    Review of Systems   Constitutional: Negative for  "activity change, appetite change, chills, diaphoresis, fatigue, fever and unexpected weight change.   HENT: Negative for congestion, dental problem, drooling, ear discharge, ear pain, facial swelling, hearing loss, mouth sores, rhinorrhea, sinus pressure and sore throat.    Eyes: Negative.    Respiratory: Negative for apnea, cough, choking, chest tightness, shortness of breath, wheezing and stridor.    Cardiovascular: Negative for chest pain, palpitations and leg swelling.   Gastrointestinal: Negative for abdominal distention, abdominal pain, anal bleeding, blood in stool, constipation, diarrhea, nausea, rectal pain and vomiting.   Endocrine: Negative.    Genitourinary: Negative for difficulty urinating, dyspareunia, dysuria, flank pain, genital sores and hematuria.   Musculoskeletal: Negative for arthralgias, back pain, gait problem, joint swelling, myalgias and neck pain.   Skin: Negative for pallor, rash and wound.   Allergic/Immunologic: Negative.    Neurological: Negative.    Hematological: Negative for adenopathy. Does not bruise/bleed easily.   Psychiatric/Behavioral: Negative for agitation, behavioral problems, confusion, dysphoric mood and hallucinations. The patient is not nervous/anxious and is not hyperactive.        Objective:       Vitals:    09/18/17 1123   BP: 126/64   Pulse: 67   Resp: 18   Temp: 98.2 °F (36.8 °C)   TempSrc: Oral   SpO2: 100%   Weight: 74.8 kg (164 lb 14.5 oz)   Height: 5' 6" (1.676 m)     Physical Exam   Constitutional: She is oriented to person, place, and time. She appears well-developed and well-nourished. No distress.   Well groomed   HENT:   Head: Normocephalic and atraumatic.   Right Ear: Tympanic membrane, external ear and ear canal normal.   Left Ear: Tympanic membrane, external ear and ear canal normal.   Nose: Nose normal. Right sinus exhibits no maxillary sinus tenderness and no frontal sinus tenderness. Left sinus exhibits no maxillary sinus tenderness and no frontal " sinus tenderness.   Mouth/Throat: Oropharynx is clear and moist and mucous membranes are normal. Mucous membranes are not pale and not dry. No oral lesions. Normal dentition. No oropharyngeal exudate.   Eyes: Conjunctivae, EOM and lids are normal. Pupils are equal, round, and reactive to light. Lids are everted and swept, no foreign bodies found. Right eye exhibits no discharge. Left eye exhibits no discharge. No scleral icterus.   Neck: Trachea normal and normal range of motion. Neck supple. No tracheal deviation present. No thyroid mass and no thyromegaly present.   No crepitus   Cardiovascular: Normal rate, regular rhythm, intact distal pulses and normal pulses.  Exam reveals no gallop and no friction rub.    Murmur heard.  Pulmonary/Chest: Effort normal and breath sounds normal. No respiratory distress. She has no wheezes. She has no rales. She exhibits no tenderness.   Abdominal: Soft. Normal appearance and bowel sounds are normal. She exhibits no distension. There is no hepatosplenomegaly. There is no tenderness. There is no guarding.   Musculoskeletal: Normal range of motion. She exhibits no tenderness.   Lymphadenopathy:        Head (right side): No submental and no submandibular adenopathy present.        Head (left side): No submental and no submandibular adenopathy present.        Right cervical: No superficial cervical and no deep cervical adenopathy present.       Left cervical: No deep cervical adenopathy present.     She has no axillary adenopathy.        Right: No supraclavicular adenopathy present.        Left: No supraclavicular adenopathy present.   Neurological: She is alert and oriented to person, place, and time. No cranial nerve deficit. Coordination normal.   Skin: Skin is warm, dry and intact. Capillary refill takes less than 2 seconds. No rash noted. She is not diaphoretic. No erythema.   Psychiatric: She has a normal mood and affect. Her behavior is normal.       Lab Results   Component  Value Date    WBC 5.88 09/18/2017    HGB 10.3 (L) 09/18/2017    HCT 31.5 (L) 09/18/2017    MCV 91 09/18/2017     09/18/2017     Lab Results   Component Value Date    CREATININE 2.9 (H) 07/24/2017    BUN 56 (H) 07/24/2017     07/24/2017    K 4.7 07/24/2017     07/24/2017    CO2 25 07/24/2017     Lab Results   Component Value Date    ALT 22 07/24/2017    AST 22 07/24/2017    ALKPHOS 97 07/24/2017    BILITOT 0.2 07/24/2017         Assessment:       71-year-old female with progressive normocytic anemia likely secondary to CKD.  Workup has also revealed a relative iron deficiency with a ferritin of 20 and 12% iron saturation.  Therefore, she received Feraheme 510 mg ×2 doses given 7/2016.  Unfortunately the anemia continued to progress,  therefore, we will proceeded with Procrit 10,000 units every 2 weeks (started 5/2017)  She has responded appropriately to Procrit with hemoglobin of 10.3 noted today.    We will continue biweekly Procrit and have the patient follow-up in 4 weeks with repeat CBC.      Normocytic anemia: Secondary to CKD:  --Continue Procrit 10,000 units every 2 weeks   --Titrate Procrit to achieve hemoglobin goal of 10   --Hold Procrit if hemoglobin greater than equal to 10.5  --Repeat CBC in 4 weeks

## 2017-10-03 ENCOUNTER — INFUSION (OUTPATIENT)
Dept: INFUSION THERAPY | Facility: HOSPITAL | Age: 72
End: 2017-10-03
Attending: INTERNAL MEDICINE
Payer: MEDICARE

## 2017-10-03 DIAGNOSIS — N18.4 CHRONIC KIDNEY DISEASE (CKD) STAGE G4/A3, SEVERELY DECREASED GLOMERULAR FILTRATION RATE (GFR) BETWEEN 15-29 ML/MIN/1.73 SQUARE METER AND ALBUMINURIA CREATININE RATIO GREATER THAN 300 MG/G: ICD-10-CM

## 2017-10-03 DIAGNOSIS — N18.4 ANEMIA IN STAGE 4 CHRONIC KIDNEY DISEASE: Primary | ICD-10-CM

## 2017-10-03 DIAGNOSIS — D63.1 ANEMIA IN STAGE 4 CHRONIC KIDNEY DISEASE: Primary | ICD-10-CM

## 2017-10-03 PROCEDURE — 96372 THER/PROPH/DIAG INJ SC/IM: CPT | Mod: PO

## 2017-10-03 PROCEDURE — 63600175 PHARM REV CODE 636 W HCPCS: Mod: PO | Performed by: INTERNAL MEDICINE

## 2017-10-03 RX ADMIN — ERYTHROPOIETIN 10000 UNITS: 10000 INJECTION, SOLUTION INTRAVENOUS; SUBCUTANEOUS at 11:10

## 2017-10-03 NOTE — PATIENT INSTRUCTIONS
[facility]  [address]  [phone] phone     [phone] fax  Hours of Operation: Monday- Friday 8:00am- 5:00pm  After hours phone  [phone]  Hematology / Oncology Physicians on call      Dr. Doug Fletcher                        Please call with any concerns regarding your appointment today.

## 2017-10-07 DIAGNOSIS — E11.40 TYPE 2 DIABETES MELLITUS WITH DIABETIC NEUROPATHY: ICD-10-CM

## 2017-10-10 DIAGNOSIS — E11.40 TYPE 2 DIABETES MELLITUS WITH DIABETIC NEUROPATHY, UNSPECIFIED LONG TERM INSULIN USE STATUS: ICD-10-CM

## 2017-10-10 RX ORDER — HUMAN INSULIN 100 [USP'U]/ML
INJECTION, SUSPENSION SUBCUTANEOUS
Refills: 1 | OUTPATIENT
Start: 2017-10-10

## 2017-10-24 ENCOUNTER — INFUSION (OUTPATIENT)
Dept: INFUSION THERAPY | Facility: HOSPITAL | Age: 72
End: 2017-10-24
Attending: INTERNAL MEDICINE
Payer: MEDICARE

## 2017-10-24 ENCOUNTER — OFFICE VISIT (OUTPATIENT)
Dept: HEMATOLOGY/ONCOLOGY | Facility: CLINIC | Age: 72
End: 2017-10-24
Payer: MEDICARE

## 2017-10-24 VITALS
HEIGHT: 66 IN | BODY MASS INDEX: 25.97 KG/M2 | OXYGEN SATURATION: 99 % | RESPIRATION RATE: 18 BRPM | SYSTOLIC BLOOD PRESSURE: 126 MMHG | TEMPERATURE: 98 F | HEART RATE: 69 BPM | WEIGHT: 161.63 LBS | DIASTOLIC BLOOD PRESSURE: 60 MMHG

## 2017-10-24 DIAGNOSIS — D63.1 ANEMIA IN STAGE 4 CHRONIC KIDNEY DISEASE: Primary | ICD-10-CM

## 2017-10-24 DIAGNOSIS — N18.4 ANEMIA IN STAGE 4 CHRONIC KIDNEY DISEASE: ICD-10-CM

## 2017-10-24 DIAGNOSIS — N18.4 ANEMIA IN STAGE 4 CHRONIC KIDNEY DISEASE: Primary | ICD-10-CM

## 2017-10-24 DIAGNOSIS — D63.1 ANEMIA ASSOCIATED WITH CHRONIC RENAL FAILURE: Primary | ICD-10-CM

## 2017-10-24 DIAGNOSIS — N18.4 CHRONIC KIDNEY DISEASE (CKD) STAGE G4/A3, SEVERELY DECREASED GLOMERULAR FILTRATION RATE (GFR) BETWEEN 15-29 ML/MIN/1.73 SQUARE METER AND ALBUMINURIA CREATININE RATIO GREATER THAN 300 MG/G: ICD-10-CM

## 2017-10-24 DIAGNOSIS — D63.1 ANEMIA IN STAGE 4 CHRONIC KIDNEY DISEASE: ICD-10-CM

## 2017-10-24 DIAGNOSIS — N18.9 ANEMIA ASSOCIATED WITH CHRONIC RENAL FAILURE: Primary | ICD-10-CM

## 2017-10-24 PROCEDURE — 99214 OFFICE O/P EST MOD 30 MIN: CPT | Mod: S$GLB,,, | Performed by: INTERNAL MEDICINE

## 2017-10-24 PROCEDURE — 99499 UNLISTED E&M SERVICE: CPT | Mod: S$GLB,,, | Performed by: INTERNAL MEDICINE

## 2017-10-24 PROCEDURE — 99999 PR PBB SHADOW E&M-EST. PATIENT-LVL III: CPT | Mod: PBBFAC,,, | Performed by: INTERNAL MEDICINE

## 2017-10-24 PROCEDURE — 96372 THER/PROPH/DIAG INJ SC/IM: CPT | Mod: PO

## 2017-10-24 PROCEDURE — 63600175 PHARM REV CODE 636 W HCPCS: Mod: PO | Performed by: INTERNAL MEDICINE

## 2017-10-24 RX ADMIN — ERYTHROPOIETIN 10000 UNITS: 10000 INJECTION, SOLUTION INTRAVENOUS; SUBCUTANEOUS at 03:10

## 2017-10-24 NOTE — PATIENT INSTRUCTIONS
[facility]  [address]  [phone] phone     [phone] fax  Hours of Operation: Monday- Friday 8:00am- 5:00pm  After hours phone  [phone]  Hematology / Oncology Physicians on call      Dr. Doug Fletcher                        Please call with any concerns regarding your appointment today.

## 2017-10-25 ENCOUNTER — OFFICE VISIT (OUTPATIENT)
Dept: NEPHROLOGY | Facility: CLINIC | Age: 72
End: 2017-10-25
Payer: MEDICARE

## 2017-10-25 VITALS
DIASTOLIC BLOOD PRESSURE: 48 MMHG | WEIGHT: 162.5 LBS | HEART RATE: 64 BPM | SYSTOLIC BLOOD PRESSURE: 132 MMHG | HEIGHT: 65 IN | BODY MASS INDEX: 27.07 KG/M2

## 2017-10-25 DIAGNOSIS — R80.9 PROTEINURIA DUE TO TYPE 2 DIABETES MELLITUS: ICD-10-CM

## 2017-10-25 DIAGNOSIS — E11.21 DIABETIC NEPHROPATHY ASSOCIATED WITH TYPE 2 DIABETES MELLITUS: ICD-10-CM

## 2017-10-25 DIAGNOSIS — N18.4 ANEMIA OF CHRONIC RENAL FAILURE, STAGE 4 (SEVERE): ICD-10-CM

## 2017-10-25 DIAGNOSIS — D63.1 ANEMIA OF CHRONIC RENAL FAILURE, STAGE 4 (SEVERE): ICD-10-CM

## 2017-10-25 DIAGNOSIS — E11.29 PROTEINURIA DUE TO TYPE 2 DIABETES MELLITUS: ICD-10-CM

## 2017-10-25 DIAGNOSIS — I10 ESSENTIAL HYPERTENSION: ICD-10-CM

## 2017-10-25 DIAGNOSIS — N25.81 SECONDARY HYPERPARATHYROIDISM: ICD-10-CM

## 2017-10-25 DIAGNOSIS — E55.9 VITAMIN D DEFICIENCY: ICD-10-CM

## 2017-10-25 DIAGNOSIS — N18.4 CHRONIC KIDNEY DISEASE, STAGE IV (SEVERE): Primary | ICD-10-CM

## 2017-10-25 DIAGNOSIS — E11.21 NEPHROTIC SYNDROME DUE TO DIABETES MELLITUS: ICD-10-CM

## 2017-10-25 DIAGNOSIS — E87.20 METABOLIC ACIDEMIA: ICD-10-CM

## 2017-10-25 PROCEDURE — 99499 UNLISTED E&M SERVICE: CPT | Mod: S$GLB,,, | Performed by: INTERNAL MEDICINE

## 2017-10-25 PROCEDURE — 99214 OFFICE O/P EST MOD 30 MIN: CPT | Mod: S$GLB,,, | Performed by: INTERNAL MEDICINE

## 2017-10-25 PROCEDURE — 99999 PR PBB SHADOW E&M-EST. PATIENT-LVL III: CPT | Mod: PBBFAC,,, | Performed by: INTERNAL MEDICINE

## 2017-10-25 NOTE — PROGRESS NOTES
Subjective:       Patient ID: Avril Monzon is a 72 y.o. Black or  female who presents for re-evaluation of progression of Chronic Kidney Disease; Proteinuria; Hypertension; Anemia; Vitamin D Deficiency; and hyperparathyroid of renal origin    Hypertension   Pertinent negatives include no chest pain, headaches, palpitations or shortness of breath.   Anemia   There has been no bruising/bleeding easily, fever or palpitations.        Patient is a 71 YO AAF with diabetic nephropathy for many years. Now has CKD-IV and Hx of retinopathy. Comes for follow up as a new patient to me     Has seen dr. Hanson in the past     10/2017 Rayaldee ; low sugars d/w patient     Review of Systems   Constitutional: Negative for activity change, appetite change, chills, diaphoresis, fatigue, fever and unexpected weight change.   HENT: Negative for congestion, dental problem, drooling, postnasal drip, rhinorrhea and voice change.    Eyes: Negative for discharge.   Respiratory: Negative for apnea, cough, choking, chest tightness, shortness of breath, wheezing and stridor.    Cardiovascular: Negative for chest pain, palpitations and leg swelling.   Gastrointestinal: Negative for abdominal distention, blood in stool, constipation, diarrhea, nausea, rectal pain and vomiting.   Endocrine: Negative for cold intolerance, heat intolerance, polydipsia and polyuria.   Genitourinary: Negative for decreased urine volume, difficulty urinating, dysuria, enuresis, flank pain, frequency, hematuria and urgency.   Musculoskeletal: Negative for arthralgias, back pain, gait problem and joint swelling.   Skin: Negative for rash.   Allergic/Immunologic: Negative for food allergies and immunocompromised state.   Neurological: Negative for dizziness, tremors, syncope, numbness and headaches.   Hematological: Does not bruise/bleed easily.   Psychiatric/Behavioral: Negative for agitation, behavioral problems and self-injury. The patient is not  "nervous/anxious and is not hyperactive.    All other systems reviewed and are negative.      Objective:   BP (!) 132/48   Pulse 64   Ht 5' 5" (1.651 m)   Wt 73.7 kg (162 lb 7.7 oz)   BMI 27.04 kg/m²      Physical Exam   Constitutional: She is oriented to person, place, and time. No distress.   HENT:   Head: Normocephalic and atraumatic.   Nose: Nose normal.   Eyes: Conjunctivae and EOM are normal. Pupils are equal, round, and reactive to light.   Neck: Normal range of motion. No JVD present. No tracheal deviation present. No thyromegaly present.   Cardiovascular: Normal rate, regular rhythm, normal heart sounds and intact distal pulses.  Exam reveals no gallop and no friction rub.    No murmur heard.  Pulmonary/Chest: Effort normal and breath sounds normal. No respiratory distress. She has no wheezes. She has no rales. She exhibits no tenderness.   Abdominal: Soft. Bowel sounds are normal. She exhibits no distension and no mass. There is no tenderness. No hernia.   Musculoskeletal: Normal range of motion. She exhibits no edema, tenderness or deformity.   Neurological: She is alert and oriented to person, place, and time. She has normal reflexes. She displays normal reflexes. No cranial nerve deficit. She exhibits normal muscle tone. Coordination normal.   Skin: Skin is warm. She is not diaphoretic. No erythema. There is pallor.   Psychiatric: She has a normal mood and affect. Her behavior is normal. Judgment and thought content normal.   Nursing note and vitals reviewed.        Lab Results   Component Value Date    CREATININE 3.3 (H) 10/24/2017    BUN 63 (H) 10/24/2017     10/24/2017    K 4.3 10/24/2017     (H) 10/24/2017    CO2 21 (L) 10/24/2017     Lab Results   Component Value Date    WBC 5.79 10/24/2017    HGB 10.0 (L) 10/24/2017    HCT 30.4 (L) 10/24/2017    MCV 89 10/24/2017     10/24/2017     Lab Results   Component Value Date    .0 (H) 10/24/2017    CALCIUM 8.8 10/24/2017    " PHOS 3.7 10/24/2017     Vitamin D level is 9 checked on 5/8/17    Assessment:    )    1. Chronic kidney disease, stage IV (severe)    2. Essential hypertension    3. Secondary hyperparathyroidism    4. Anemia of chronic renal failure, stage 4 (severe)    5. Proteinuria due to type 2 diabetes mellitus    6. Diabetic nephropathy associated with type 2 diabetes mellitus    7. Metabolic acidemia    8. Vitamin D deficiency    9. Nephrotic syndrome due to diabetes mellitus        Plan:         1. Chronic kidney Disease stage IV/V: GFR 16 % cystatin C; avoid NSAids; patient's kidney function is about 10-15 percent.  She will need education class ; patient lives with daughter --possibly PD     2. HPT with severe vitamin D deficiency: Start on vitamin D  Rayaldee 30 microgram daily.  Re-apply for PA     3. Anemia: doing well with procrit in heme dept     4. HTN : controlled     5. Protienuria: in remission      6:Acidosis: better on sodium bicarbonate    7: LBP: MRI reviewed : refer to chiropracter ? PT referral     8.  Hyperkalemia: much better     9. Tonic water for leg cramps      10. type 2 diabetes with hypoglycemia:d/w patient to avoid low sugars       follow up in 3 months       Kim Doe MD

## 2017-10-25 NOTE — PATIENT INSTRUCTIONS
1. Chronic kidney Disease stage IV/V: GFR 16 % cystatin C; avoid NSAids; patient's kidney function is about 10-15 percent.  She will need education class ; patient lives with daughter --possibly PD     2. HPT with severe vitamin D deficiency: Start on vitamin D  Rayaldee 30 microgram daily.  Re-apply for PA     3. Anemia: doing well with procrit in heme dept     4. HTN : controlled     5. Protienuria: in remission      6:Acidosis: better on sodium bicarbonate    7: LBP: MRI reviewed : refer to chiropracter ? PT referral     8.  Hyperkalemia: much better     9. Tonic water for leg cramps      10. type 2 diabetes with hypoglycemia:d/w patient to avoid low sugars       follow up in 3 months

## 2017-10-26 NOTE — PROGRESS NOTES
Hematology/Oncology Office Note    Reason for referral:  Normocytic anemia    CC:  Anemia    Referred by:  No ref. provider found    Diagnosis:  Normocytic anemia  CKD    Treatment:  Procrit 10,000 units biweekly      History of present illness:  71-year-old female with a history of diabetes mellitus, CKD, hypertension, and dyslipidemia who was been referred for progressive normocytic anemia.  Patient has significant CK D with baseline creatinine approximately 3.0.  Hemoglobin noted to be 9.3 on 6/14/16 and is trended down since 2012 when he was noted to be  11.3.  She reports fatigue, malaise, and cold intolerance, however, denies other significant symptoms.      I have reviewed and updated the HPI, ROS, PMHx, Social Hx, Family Hx and treatment history.      Today's visit:  Patient continues to receive biweekly Procrit for anemia related to chronic kidney disease.  She has no new complaints today        Past Medical History:   Diagnosis Date    Anemia     Arthritis     back, hand, knees    Back pain     Cataract     CKD stage G4/A3, GFR 15 - 29 and albumin creatinine ratio >300 mg/g     GFR 40% Jan 2014 and 33% ub 3/2014 ([facility])    Diabetic retinopathy     DM (diabetes mellitus) type II controlled, neurological manifestation     GERD (gastroesophageal reflux disease)     Glaucoma     Hyperlipidemia     Hypertension     Peripheral neuropathy     Polyneuropathy     Proteinuria     >6 mo     Seizures     [facility] 1/2014 -dick CT NRI showed small vessel    Stroke 2012,2014    Tobacco dependence     Type 2 diabetes with peripheral circulatory disorder, controlled          Social History:  No tobacco, alcohol, or illicit drugs    Family History: family history includes Cancer in her maternal grandmother; Diabetes in her mother; Heart disease in her mother; Hyperlipidemia in her mother; Hypertension in her mother; Muscular dystrophy in her son.      HPI    Review of Systems   Constitutional: Negative for  "activity change, appetite change, chills, diaphoresis, fatigue, fever and unexpected weight change.   HENT: Negative for congestion, drooling, ear discharge, facial swelling, mouth sores, nosebleeds, postnasal drip, sinus pressure, sneezing and sore throat.    Eyes: Negative.    Respiratory: Negative for apnea, cough, shortness of breath, wheezing and stridor.    Cardiovascular: Negative for chest pain, palpitations and leg swelling.   Gastrointestinal: Negative for abdominal distention, abdominal pain, anal bleeding, blood in stool, constipation, diarrhea and nausea.   Endocrine: Negative.    Genitourinary: Negative for difficulty urinating, dyspareunia, dysuria, flank pain, genital sores and hematuria.   Musculoskeletal: Negative for arthralgias, back pain, gait problem, joint swelling, myalgias and neck pain.   Skin: Negative for rash and wound.   Allergic/Immunologic: Negative.    Neurological: Negative.  Negative for dizziness, weakness and headaches.   Hematological: Negative for adenopathy. Does not bruise/bleed easily.   Psychiatric/Behavioral: Negative for agitation, behavioral problems, confusion, dysphoric mood, hallucinations, self-injury and sleep disturbance. The patient is not nervous/anxious and is not hyperactive.        Objective:       Vitals:    10/24/17 1455   BP: 126/60   Pulse: 69   Resp: 18   Temp: 98 °F (36.7 °C)   TempSrc: Oral   SpO2: 99%   Weight: 73.3 kg (161 lb 9.6 oz)   Height: 5' 6" (1.676 m)     Physical Exam   Constitutional: She is oriented to person, place, and time. She appears well-developed and well-nourished. No distress.   Well groomed   HENT:   Head: Normocephalic and atraumatic.   Right Ear: Tympanic membrane, external ear and ear canal normal.   Left Ear: Tympanic membrane, external ear and ear canal normal.   Nose: Nose normal. Right sinus exhibits no maxillary sinus tenderness and no frontal sinus tenderness. Left sinus exhibits no maxillary sinus tenderness and no " frontal sinus tenderness.   Mouth/Throat: Oropharynx is clear and moist and mucous membranes are normal. Mucous membranes are not pale and not dry. No oral lesions. Normal dentition. No oropharyngeal exudate.   Eyes: Conjunctivae, EOM and lids are normal. Pupils are equal, round, and reactive to light. Lids are everted and swept, no foreign bodies found. Right eye exhibits no discharge. Left eye exhibits no discharge. No scleral icterus.   Neck: Trachea normal and normal range of motion. Neck supple. No tracheal deviation present. No thyroid mass and no thyromegaly present.   No crepitus   Cardiovascular: Normal rate, regular rhythm, intact distal pulses and normal pulses.  Exam reveals no gallop and no friction rub.    Murmur heard.  Pulmonary/Chest: Effort normal and breath sounds normal. No respiratory distress. She has no wheezes. She has no rales. She exhibits no tenderness.   Abdominal: Soft. Normal appearance and bowel sounds are normal. She exhibits no distension. There is no hepatosplenomegaly. There is no tenderness. There is no guarding.   Musculoskeletal: Normal range of motion. She exhibits no tenderness.   Lymphadenopathy:        Head (right side): No submental and no submandibular adenopathy present.        Head (left side): No submental and no submandibular adenopathy present.     She has no cervical adenopathy.     She has no axillary adenopathy.   Neurological: She is alert and oriented to person, place, and time. No cranial nerve deficit. Coordination normal.   Skin: Skin is warm, dry and intact. Capillary refill takes less than 2 seconds. No abrasion, no bruising and no rash noted. She is not diaphoretic. No cyanosis or erythema. Nails show no clubbing.   Psychiatric: She has a normal mood and affect. Her behavior is normal.       Lab Results   Component Value Date    WBC 5.79 10/24/2017    HGB 10.0 (L) 10/24/2017    HCT 30.4 (L) 10/24/2017    MCV 89 10/24/2017     10/24/2017     Lab  Results   Component Value Date    CREATININE 3.3 (H) 10/24/2017    BUN 63 (H) 10/24/2017     10/24/2017    K 4.3 10/24/2017     (H) 10/24/2017    CO2 21 (L) 10/24/2017     Lab Results   Component Value Date    ALT 22 07/24/2017    AST 22 07/24/2017    ALKPHOS 97 07/24/2017    BILITOT 0.2 07/24/2017         Assessment:       71-year-old female with progressive normocytic anemia likely secondary to CKD.  Workup has also revealed a relative iron deficiency with a ferritin of 20 and 12% iron saturation.  Therefore, she received Feraheme 510 mg ×2 doses given 7/2016.  Unfortunately the anemia continued to progress,  therefore, we will proceeded with Procrit 10,000 units every 2 weeks (started 5/2017).  She has responded appropriately to Procrit with CBC of 10.0 today.  We will continue Procrit 10,000 units every 2 weeks and adjust dose accordingly to maintain: Hemoglobin of 9.5-10.5.        Normocytic anemia: Secondary to CKD:  --Continue Procrit 10,000 units every 2 weeks   --Titrate Procrit to achieve hemoglobin goal of 9.5-10.5   --Hold Procrit if hemoglobin greater than equal to 10.5  --Repeat CBC in 4 weeks

## 2017-11-01 ENCOUNTER — LAB VISIT (OUTPATIENT)
Dept: LAB | Facility: HOSPITAL | Age: 72
End: 2017-11-01
Attending: PHYSICIAN ASSISTANT
Payer: MEDICARE

## 2017-11-01 ENCOUNTER — OFFICE VISIT (OUTPATIENT)
Dept: INTERNAL MEDICINE | Facility: CLINIC | Age: 72
End: 2017-11-01
Payer: MEDICARE

## 2017-11-01 ENCOUNTER — TELEPHONE (OUTPATIENT)
Dept: NEPHROLOGY | Facility: CLINIC | Age: 72
End: 2017-11-01

## 2017-11-01 VITALS
TEMPERATURE: 97 F | SYSTOLIC BLOOD PRESSURE: 114 MMHG | HEIGHT: 65 IN | OXYGEN SATURATION: 97 % | BODY MASS INDEX: 26.63 KG/M2 | WEIGHT: 159.81 LBS | DIASTOLIC BLOOD PRESSURE: 62 MMHG | HEART RATE: 68 BPM

## 2017-11-01 DIAGNOSIS — I77.9 BILATERAL CAROTID ARTERY DISEASE: ICD-10-CM

## 2017-11-01 DIAGNOSIS — E11.59 HYPERTENSION ASSOCIATED WITH DIABETES: Chronic | ICD-10-CM

## 2017-11-01 DIAGNOSIS — N18.4 CKD (CHRONIC KIDNEY DISEASE), STAGE IV: ICD-10-CM

## 2017-11-01 DIAGNOSIS — E55.9 VITAMIN D DEFICIENCY: ICD-10-CM

## 2017-11-01 DIAGNOSIS — Z79.4 CONTROLLED TYPE 2 DIABETES MELLITUS WITH DIABETIC POLYNEUROPATHY, WITH LONG-TERM CURRENT USE OF INSULIN: Primary | ICD-10-CM

## 2017-11-01 DIAGNOSIS — Z79.4 CONTROLLED TYPE 2 DIABETES MELLITUS WITH DIABETIC POLYNEUROPATHY, WITH LONG-TERM CURRENT USE OF INSULIN: ICD-10-CM

## 2017-11-01 DIAGNOSIS — I15.2 HYPERTENSION ASSOCIATED WITH DIABETES: Chronic | ICD-10-CM

## 2017-11-01 DIAGNOSIS — Z12.11 SCREENING FOR COLON CANCER: ICD-10-CM

## 2017-11-01 DIAGNOSIS — E11.42 CONTROLLED TYPE 2 DIABETES MELLITUS WITH DIABETIC POLYNEUROPATHY, WITH LONG-TERM CURRENT USE OF INSULIN: ICD-10-CM

## 2017-11-01 DIAGNOSIS — N18.9 ANEMIA ASSOCIATED WITH CHRONIC RENAL FAILURE: ICD-10-CM

## 2017-11-01 DIAGNOSIS — E11.42 CONTROLLED TYPE 2 DIABETES MELLITUS WITH DIABETIC POLYNEUROPATHY, WITH LONG-TERM CURRENT USE OF INSULIN: Primary | ICD-10-CM

## 2017-11-01 DIAGNOSIS — D63.1 ANEMIA ASSOCIATED WITH CHRONIC RENAL FAILURE: ICD-10-CM

## 2017-11-01 DIAGNOSIS — Z86.73 HISTORY OF CVA (CEREBROVASCULAR ACCIDENT): ICD-10-CM

## 2017-11-01 LAB
ESTIMATED AVG GLUCOSE: 140 MG/DL
HBA1C MFR BLD HPLC: 6.5 %

## 2017-11-01 PROCEDURE — 83036 HEMOGLOBIN GLYCOSYLATED A1C: CPT

## 2017-11-01 PROCEDURE — 36415 COLL VENOUS BLD VENIPUNCTURE: CPT | Mod: PO

## 2017-11-01 PROCEDURE — 99499 UNLISTED E&M SERVICE: CPT | Mod: S$GLB,,, | Performed by: PHYSICIAN ASSISTANT

## 2017-11-01 PROCEDURE — 99214 OFFICE O/P EST MOD 30 MIN: CPT | Mod: S$GLB,,, | Performed by: PHYSICIAN ASSISTANT

## 2017-11-01 PROCEDURE — 99999 PR PBB SHADOW E&M-EST. PATIENT-LVL IV: CPT | Mod: PBBFAC,,, | Performed by: PHYSICIAN ASSISTANT

## 2017-11-01 NOTE — PROGRESS NOTES
Subjective:       Patient ID: Avril Monzon is a 72 y.o. female.    Chief Complaint: Follow-up    72 year old female presents to clinic for chronic medical problems. She missed her last PCP appt due to ill . She is UTD with eye doctor and is scheduled to see podiatrist in one month. She reports she checks her feet daily. She says she does not monitor her glucose at home and is taking her insulin just once daily (Novolin 70/30 30U daily) X 2-3 years. She would like to get back in with the DM clinic. She admits she did not follow through with her occult blood stool screen and DEXA scan from PCP last year. Pt reports she had a colonoscopy many years ago and refuses repeat at this time due to concern of anesthesia and risks involved. She reports no CP, SOB, exertional sxs, or other medical complaints.    PCP: Dr. Germain    Patient Active Problem List:     Constipation     Hypertension associated with diabetes     Pure hypercholesterolemia     Controlled type 2 diabetes mellitus with diabetic polyneuropathy, with long-term current use of insulin     Chronic kidney disease (CKD) stage G4/A3, severely decreased glomerular filtration rate (GFR) between 15-29 mL/min/1.73 square meter and albuminuria creatinine ratio greater than 300 mg/g     Proteinuria     Anemia associated with chronic renal failure     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, with long-term current use of insulin     Cataracta brunescens of right eye     Iron deficiency anemia     Bilateral carotid artery disease     Aortic atherosclerosis     Type 2 diabetes mellitus with circulatory disorder, with long-term current use of insulin     History of CVA (cerebrovascular accident)     Hyperparathyroidism     Vitamin D deficiency     DDD (degenerative disc disease), lumbar     Anemia in stage 4 chronic kidney disease      Review of Systems   Constitutional: Negative for chills and fever.   Respiratory: Negative for  "cough and shortness of breath.    Cardiovascular: Negative for chest pain, palpitations and leg swelling.   Gastrointestinal: Negative for abdominal pain, blood in stool, nausea and vomiting.   Skin: Negative for rash.   Neurological: Negative for dizziness, weakness, numbness and headaches.   Psychiatric/Behavioral: Negative for confusion.       Objective:       Vitals:    11/01/17 1025   BP: 114/62   BP Location: Right arm   Patient Position: Sitting   BP Method: Small (Manual)   Pulse: 68   Temp: 97.3 °F (36.3 °C)   TempSrc: Tympanic   SpO2: 97%   Weight: 72.5 kg (159 lb 13.3 oz)   Height: 5' 5" (1.651 m)     Physical Exam   Constitutional: She is oriented to person, place, and time. She appears well-developed and well-nourished. No distress.   HENT:   Head: Normocephalic and atraumatic.   Eyes: EOM are normal. No scleral icterus.   Neck: Neck supple.   Cardiovascular: Normal rate and regular rhythm.    Pulmonary/Chest: Effort normal and breath sounds normal. No respiratory distress. She has no decreased breath sounds. She has no wheezes. She has no rhonchi. She has no rales.   Musculoskeletal: Normal range of motion. She exhibits no edema.   Neurological: She is alert and oriented to person, place, and time. No cranial nerve deficit.   Skin: Skin is warm and dry. No rash noted.   Psychiatric: She has a normal mood and affect. Her speech is normal and behavior is normal. Thought content normal.       Component      Latest Ref Rng & Units 10/24/2017 6/23/2017   WBC      3.90 - 12.70 K/uL 5.79    RBC      4.00 - 5.40 M/uL 3.40 (L)    Hemoglobin      12.0 - 16.0 g/dL 10.0 (L)    Hematocrit      37.0 - 48.5 % 30.4 (L)    MCV      82 - 98 fL 89    MCH      27.0 - 31.0 pg 29.4    MCHC      32.0 - 36.0 g/dL 32.9    RDW      11.5 - 14.5 % 14.7 (H)    Platelets      150 - 350 K/uL 206    MPV      9.2 - 12.9 fL 9.6    Gran #      1.8 - 7.7 K/uL 3.8    Lymph #      1.0 - 4.8 K/uL 1.6    Mono #      0.3 - 1.0 K/uL 0.2 (L)  "   Eos #      0.0 - 0.5 K/uL 0.1    Baso #      0.00 - 0.20 K/uL 0.01    Gran%      38.0 - 73.0 % 65.9    Lymph%      18.0 - 48.0 % 28.0    Mono%      4.0 - 15.0 % 4.0    Eosinophil%      0.0 - 8.0 % 1.9    Basophil%      0.0 - 1.9 % 0.2    Differential Method       Automated    Glucose      70 - 110 mg/dL 47 (LL)    Sodium      136 - 145 mmol/L 143    Potassium      3.5 - 5.1 mmol/L 4.3    Chloride      95 - 110 mmol/L 111 (H)    CO2      23 - 29 mmol/L 21 (L)    BUN, Bld      8 - 23 mg/dL 63 (H)    Calcium      8.7 - 10.5 mg/dL 8.8    Creatinine      0.5 - 1.4 mg/dL 3.3 (H)    Albumin      3.5 - 5.2 g/dL 3.4 (L)    Phosphorus      2.7 - 4.5 mg/dL 3.7    eGFR if African American      >60 mL/min/1.73 m:2 15.6 (A)    eGFR if non African American      >60 mL/min/1.73 m:2 13.5 (A)    Anion Gap      8 - 16 mmol/L 11    Hemoglobin A1C      4.0 - 5.6 %  6.5 (H)   Estimated Avg Glucose      68 - 131 mg/dL  140 (H)     Assessment:       1. Controlled type 2 diabetes mellitus with diabetic polyneuropathy, with long-term current use of insulin    2. Hypertension associated with diabetes    3. Anemia associated with chronic renal failure    4. Vitamin D deficiency    5. History of CVA (cerebrovascular accident)    6. Bilateral carotid artery disease    7. CKD (chronic kidney disease), stage IV/V    8. Screening for colon cancer        Plan:         Avril was seen today for follow-up.    Diagnoses and all orders for this visit:    Controlled type 2 diabetes mellitus with diabetic polyneuropathy, with long-term current use of insulin  -     Hemoglobin A1c; Future  -     Ambulatory consult to Diabetic Education  Check feet daily and follow healthy diet. F/u with podiatry and eye doctor as recommended. Monitor glucose.     Hypertension associated with diabetes  Controlled. Monitor BP and f/u with PCP and nephrology as recommended.    Anemia associated with chronic renal failure  Stable. F/u with nephrology as  recommended.    Vitamin D deficiency  Pt has not yet started her vit D due to insurance - says nephrology is working on this. F/u with nephrology as recommended.    History of CVA (cerebrovascular accident)    Bilateral carotid artery disease    CKD (chronic kidney disease), stage IV/V  Per nephrology.    Screening for colon cancer  -     Fecal Immunochemical Test (iFOBT); Future    Will rechedule DEXA appt for pt. F/u with PCP for further health management.

## 2017-11-01 NOTE — TELEPHONE ENCOUNTER
----- Message from Ratna Rollins sent at 10/31/2017 10:03 AM CDT -----  Contact: [employer]/Kristen  States she needs to speak to Hanane or Iniki regarding the rayaldee medication. Please call Kristen at [phone] ext [phone]. Thank you

## 2017-11-01 NOTE — TELEPHONE ENCOUNTER
Returned call to Kristen with [employer] who states she has not been able to reach pt. To inform of $8 copay assistance for Juanboaz, I gave Kristen an alternate number to reach pt.. Kristen will get us updated and let us know if pt. Has been reached.

## 2017-11-02 ENCOUNTER — TELEPHONE (OUTPATIENT)
Dept: NEPHROLOGY | Facility: CLINIC | Age: 72
End: 2017-11-02

## 2017-11-02 ENCOUNTER — TELEPHONE (OUTPATIENT)
Dept: INTERNAL MEDICINE | Facility: CLINIC | Age: 72
End: 2017-11-02

## 2017-11-02 NOTE — TELEPHONE ENCOUNTER
----- Message from WILLIAN Delong sent at 11/1/2017  8:58 PM CDT -----  A1C is stable at 6.5 - good diabetes control.

## 2017-11-07 ENCOUNTER — INFUSION (OUTPATIENT)
Dept: INFUSION THERAPY | Facility: HOSPITAL | Age: 72
End: 2017-11-07
Attending: INTERNAL MEDICINE
Payer: MEDICARE

## 2017-11-07 VITALS
BODY MASS INDEX: 26.63 KG/M2 | DIASTOLIC BLOOD PRESSURE: 64 MMHG | HEIGHT: 65 IN | SYSTOLIC BLOOD PRESSURE: 141 MMHG | WEIGHT: 159.81 LBS | HEART RATE: 65 BPM

## 2017-11-07 DIAGNOSIS — N18.4 ANEMIA IN STAGE 4 CHRONIC KIDNEY DISEASE: Primary | ICD-10-CM

## 2017-11-07 DIAGNOSIS — D63.1 ANEMIA IN STAGE 4 CHRONIC KIDNEY DISEASE: Primary | ICD-10-CM

## 2017-11-07 DIAGNOSIS — N18.4 CHRONIC KIDNEY DISEASE (CKD) STAGE G4/A3, SEVERELY DECREASED GLOMERULAR FILTRATION RATE (GFR) BETWEEN 15-29 ML/MIN/1.73 SQUARE METER AND ALBUMINURIA CREATININE RATIO GREATER THAN 300 MG/G: ICD-10-CM

## 2017-11-07 PROCEDURE — 63600175 PHARM REV CODE 636 W HCPCS: Mod: PO,EC | Performed by: INTERNAL MEDICINE

## 2017-11-07 PROCEDURE — 96372 THER/PROPH/DIAG INJ SC/IM: CPT | Mod: PO

## 2017-11-07 RX ADMIN — ERYTHROPOIETIN 10000 UNITS: 10000 INJECTION, SOLUTION INTRAVENOUS; SUBCUTANEOUS at 03:11

## 2017-11-07 NOTE — PATIENT INSTRUCTIONS
[facility]  [address]  [phone] phone     [phone] fax  Hours of Operation: Monday- Friday 8:00am- 5:00pm  After hours phone  [phone]  Hematology / Oncology Physicians on call      Dr. Doug Fletcher                        Please call with any concerns regarding your appointment today.

## 2017-11-21 RX ORDER — HYDRALAZINE HYDROCHLORIDE 10 MG/1
TABLET, FILM COATED ORAL
Qty: 180 TABLET | Refills: 11 | Status: SHIPPED | OUTPATIENT
Start: 2017-11-21 | End: 2018-08-07 | Stop reason: SDUPTHER

## 2018-01-18 ENCOUNTER — TELEPHONE (OUTPATIENT)
Dept: OPHTHALMOLOGY | Facility: CLINIC | Age: 73
End: 2018-01-18

## 2018-01-18 NOTE — TELEPHONE ENCOUNTER
Spoke to ms will daughter, she said that she went to the store and will call us back in about 30 minutes. kf

## 2018-01-23 ENCOUNTER — OFFICE VISIT (OUTPATIENT)
Dept: HEMATOLOGY/ONCOLOGY | Facility: CLINIC | Age: 73
End: 2018-01-23
Payer: MEDICARE

## 2018-01-23 ENCOUNTER — LAB VISIT (OUTPATIENT)
Dept: LAB | Facility: HOSPITAL | Age: 73
End: 2018-01-23
Attending: INTERNAL MEDICINE
Payer: MEDICARE

## 2018-01-23 VITALS
OXYGEN SATURATION: 98 % | HEART RATE: 74 BPM | DIASTOLIC BLOOD PRESSURE: 63 MMHG | RESPIRATION RATE: 18 BRPM | BODY MASS INDEX: 27.18 KG/M2 | SYSTOLIC BLOOD PRESSURE: 143 MMHG | TEMPERATURE: 98 F | WEIGHT: 163.38 LBS

## 2018-01-23 DIAGNOSIS — N18.9 ANEMIA ASSOCIATED WITH CHRONIC RENAL FAILURE: Primary | ICD-10-CM

## 2018-01-23 DIAGNOSIS — N18.4 ANEMIA OF CHRONIC RENAL FAILURE, STAGE 4 (SEVERE): ICD-10-CM

## 2018-01-23 DIAGNOSIS — N18.4 CHRONIC KIDNEY DISEASE, STAGE IV (SEVERE): ICD-10-CM

## 2018-01-23 DIAGNOSIS — D63.1 ANEMIA IN STAGE 4 CHRONIC KIDNEY DISEASE: ICD-10-CM

## 2018-01-23 DIAGNOSIS — E11.21 NEPHROTIC SYNDROME DUE TO DIABETES MELLITUS: ICD-10-CM

## 2018-01-23 DIAGNOSIS — D64.9 NORMOCYTIC NORMOCHROMIC ANEMIA: ICD-10-CM

## 2018-01-23 DIAGNOSIS — E55.9 VITAMIN D DEFICIENCY: ICD-10-CM

## 2018-01-23 DIAGNOSIS — R80.9 PROTEINURIA DUE TO TYPE 2 DIABETES MELLITUS: ICD-10-CM

## 2018-01-23 DIAGNOSIS — N25.81 SECONDARY HYPERPARATHYROIDISM: ICD-10-CM

## 2018-01-23 DIAGNOSIS — N18.4 ANEMIA IN STAGE 4 CHRONIC KIDNEY DISEASE: ICD-10-CM

## 2018-01-23 DIAGNOSIS — E11.21 DIABETIC NEPHROPATHY ASSOCIATED WITH TYPE 2 DIABETES MELLITUS: ICD-10-CM

## 2018-01-23 DIAGNOSIS — D63.1 ANEMIA ASSOCIATED WITH CHRONIC RENAL FAILURE: Primary | ICD-10-CM

## 2018-01-23 DIAGNOSIS — E11.29 PROTEINURIA DUE TO TYPE 2 DIABETES MELLITUS: ICD-10-CM

## 2018-01-23 DIAGNOSIS — D63.1 ANEMIA OF CHRONIC RENAL FAILURE, STAGE 4 (SEVERE): ICD-10-CM

## 2018-01-23 DIAGNOSIS — N18.4 CHRONIC KIDNEY DISEASE (CKD) STAGE G4/A3, SEVERELY DECREASED GLOMERULAR FILTRATION RATE (GFR) BETWEEN 15-29 ML/MIN/1.73 SQUARE METER AND ALBUMINURIA CREATININE RATIO GREATER THAN 300 MG/G: ICD-10-CM

## 2018-01-23 DIAGNOSIS — E87.20 METABOLIC ACIDEMIA: ICD-10-CM

## 2018-01-23 DIAGNOSIS — I10 ESSENTIAL HYPERTENSION: ICD-10-CM

## 2018-01-23 LAB
25(OH)D3+25(OH)D2 SERPL-MCNC: 8 NG/ML
BASOPHILS # BLD AUTO: 0.02 K/UL
BASOPHILS NFR BLD: 0.3 %
DIFFERENTIAL METHOD: ABNORMAL
EOSINOPHIL # BLD AUTO: 0.1 K/UL
EOSINOPHIL NFR BLD: 1.3 %
ERYTHROCYTE [DISTWIDTH] IN BLOOD BY AUTOMATED COUNT: 13.3 %
HCT VFR BLD AUTO: 30 %
HGB BLD-MCNC: 9.5 G/DL
IMM GRANULOCYTES # BLD AUTO: 0.01 K/UL
IMM GRANULOCYTES NFR BLD AUTO: 0.1 %
LYMPHOCYTES # BLD AUTO: 1.6 K/UL
LYMPHOCYTES NFR BLD: 23.7 %
MCH RBC QN AUTO: 29.7 PG
MCHC RBC AUTO-ENTMCNC: 31.7 G/DL
MCV RBC AUTO: 94 FL
MONOCYTES # BLD AUTO: 0.5 K/UL
MONOCYTES NFR BLD: 7.5 %
NEUTROPHILS # BLD AUTO: 4.5 K/UL
NEUTROPHILS NFR BLD: 67.1 %
NRBC BLD-RTO: 0 /100 WBC
PLATELET # BLD AUTO: 208 K/UL
PMV BLD AUTO: 10.8 FL
PTH-INTACT SERPL-MCNC: 391 PG/ML
RBC # BLD AUTO: 3.2 M/UL
WBC # BLD AUTO: 6.78 K/UL

## 2018-01-23 PROCEDURE — 99999 PR PBB SHADOW E&M-EST. PATIENT-LVL III: CPT | Mod: PBBFAC,,, | Performed by: INTERNAL MEDICINE

## 2018-01-23 PROCEDURE — 80069 RENAL FUNCTION PANEL: CPT

## 2018-01-23 PROCEDURE — 36415 COLL VENOUS BLD VENIPUNCTURE: CPT | Mod: PO

## 2018-01-23 PROCEDURE — 82306 VITAMIN D 25 HYDROXY: CPT

## 2018-01-23 PROCEDURE — 99499 UNLISTED E&M SERVICE: CPT | Mod: S$GLB,,, | Performed by: INTERNAL MEDICINE

## 2018-01-23 PROCEDURE — 82610 CYSTATIN C: CPT

## 2018-01-23 PROCEDURE — 85025 COMPLETE CBC W/AUTO DIFF WBC: CPT

## 2018-01-23 PROCEDURE — 83970 ASSAY OF PARATHORMONE: CPT

## 2018-01-23 PROCEDURE — 99214 OFFICE O/P EST MOD 30 MIN: CPT | Mod: S$GLB,,, | Performed by: INTERNAL MEDICINE

## 2018-01-23 NOTE — PROGRESS NOTES
Hematology/Oncology Office Note    Reason for referral:  Normocytic anemia    CC:  Anemia    Referred by:  No ref. provider found    Diagnosis:  Normocytic anemia  CKD    Treatment:  Procrit 10,000 units biweekly      History of present illness:  71-year-old female with a history of diabetes mellitus, CKD, hypertension, and dyslipidemia who was been referred for progressive normocytic anemia.  Patient has significant CK D with baseline creatinine approximately 3.0.  Hemoglobin noted to be 9.3 on 6/14/16 and is trended down since 2012 when he was noted to be  11.3.  She reports fatigue, malaise, and cold intolerance, however, denies other significant symptoms.      I have reviewed and updated the HPI, ROS, PMHx, Social Hx, Family Hx and treatment history.      Today's visit:  Patient discontinued biweekly Procrit and 11/2017 due to social circumstances.  She presents today for routine follow-up and has no specific complaints.        Past Medical History:   Diagnosis Date    Anemia     Arthritis     back, hand, knees    Back pain     Cataract     CKD stage G4/A3, GFR 15 - 29 and albumin creatinine ratio >300 mg/g     GFR 40% Jan 2014 and 33% ub 3/2014 ([facility])    Diabetic retinopathy     DM (diabetes mellitus) type II controlled, neurological manifestation     GERD (gastroesophageal reflux disease)     Glaucoma     Hyperlipidemia     Hypertension     Peripheral neuropathy     Polyneuropathy     Proteinuria     >6 mo     Seizures     [facility] 1/2014 -dick CT NRI showed small vessel    Stroke 2012,2014    Tobacco dependence     Type 2 diabetes with peripheral circulatory disorder, controlled          Social History:  No tobacco, alcohol, or illicit drugs    Family History: family history includes Cancer in her maternal grandmother; Diabetes in her mother; Heart disease in her mother; Hyperlipidemia in her mother; Hypertension in her mother; Muscular dystrophy in her son.      HPI    Review of Systems    Constitutional: Negative for activity change, fatigue, fever and unexpected weight change.   HENT: Negative for congestion, dental problem, drooling, ear discharge, facial swelling, mouth sores, nosebleeds, postnasal drip and sneezing.    Eyes: Negative.    Respiratory: Negative for apnea, cough, choking, shortness of breath, wheezing and stridor.    Cardiovascular: Negative for chest pain, palpitations and leg swelling.   Gastrointestinal: Negative for abdominal distention, abdominal pain, anal bleeding, diarrhea and nausea.   Endocrine: Negative.    Genitourinary: Negative for difficulty urinating, flank pain, genital sores and hematuria.   Musculoskeletal: Negative for arthralgias, back pain and neck pain.   Skin: Negative for rash and wound.   Allergic/Immunologic: Negative.    Neurological: Negative.  Negative for dizziness, facial asymmetry, weakness and headaches.   Hematological: Negative for adenopathy. Does not bruise/bleed easily.   Psychiatric/Behavioral: Negative for agitation, confusion, dysphoric mood, hallucinations, self-injury and sleep disturbance. The patient is not nervous/anxious and is not hyperactive.        Objective:       Vitals:    01/23/18 1429   BP: (!) 143/63   Pulse: 74   Resp: 18   Temp: 97.9 °F (36.6 °C)   TempSrc: Oral   SpO2: 98%   Weight: 74.1 kg (163 lb 5.8 oz)     Physical Exam   Constitutional: She is oriented to person, place, and time. She appears well-developed and well-nourished. No distress.   Well groomed   HENT:   Head: Normocephalic and atraumatic.   Right Ear: Tympanic membrane, external ear and ear canal normal.   Left Ear: Tympanic membrane, external ear and ear canal normal.   Nose: Nose normal. Right sinus exhibits no maxillary sinus tenderness and no frontal sinus tenderness. Left sinus exhibits no maxillary sinus tenderness and no frontal sinus tenderness.   Mouth/Throat: Oropharynx is clear and moist and mucous membranes are normal. Mucous membranes are not  pale and not dry. No oral lesions. Normal dentition. No oropharyngeal exudate.   Eyes: Conjunctivae, EOM and lids are normal. Pupils are equal, round, and reactive to light. Lids are everted and swept, no foreign bodies found. Right eye exhibits no discharge. Left eye exhibits no discharge. No scleral icterus.   Neck: Trachea normal and normal range of motion. Neck supple. No tracheal deviation present. No thyroid mass and no thyromegaly present.   No crepitus   Cardiovascular: Normal rate, regular rhythm, intact distal pulses and normal pulses.  Exam reveals no gallop and no friction rub.    Murmur heard.  Pulmonary/Chest: Effort normal and breath sounds normal. No respiratory distress. She has no wheezes. She has no rales. She exhibits no tenderness.   Abdominal: Soft. Normal appearance and bowel sounds are normal. She exhibits no distension. There is no hepatosplenomegaly. There is no tenderness. There is no guarding.   Musculoskeletal: Normal range of motion. She exhibits no tenderness.   Lymphadenopathy:        Head (right side): No submental and no submandibular adenopathy present.        Head (left side): No submental and no submandibular adenopathy present.     She has no cervical adenopathy.     She has no axillary adenopathy.   Neurological: She is alert and oriented to person, place, and time. No cranial nerve deficit. Coordination normal.   Skin: Skin is warm, dry and intact. Capillary refill takes less than 2 seconds. No rash noted. She is not diaphoretic. No pallor.   Psychiatric: She has a normal mood and affect. Her behavior is normal.       Lab Results   Component Value Date    WBC 5.79 10/24/2017    HGB 10.0 (L) 10/24/2017    HCT 30.4 (L) 10/24/2017    MCV 89 10/24/2017     10/24/2017     Lab Results   Component Value Date    CREATININE 3.3 (H) 10/24/2017    BUN 63 (H) 10/24/2017     10/24/2017    K 4.3 10/24/2017     (H) 10/24/2017    CO2 21 (L) 10/24/2017     Lab Results    Component Value Date    ALT 22 07/24/2017    AST 22 07/24/2017    ALKPHOS 97 07/24/2017    BILITOT 0.2 07/24/2017         Assessment:       71-year-old female with progressive normocytic anemia likely secondary to CKD.  Workup has also revealed a relative iron deficiency with a ferritin of 20 and 12% iron saturation.  Therefore, she received Feraheme 510 mg ×2 doses given 7/2016.  Unfortunately the anemia continued to progress,  therefore, we will proceeded with Procrit 10,000 units every 2 weeks (started 5/2017).  She has responded appropriately to Procrit.    Unfortunately she discontinued Procrit in 10/2017 due to social circumstances with an ill .  She presents today for routine follow-up with plans to initiate Procrit if hemoglobin is less than 9.5.  We will follow up on labs performed by Dr. Lozoya and plan to restart Procrit if hemoglobin is less than 9.5      Normocytic anemia: Secondary to CKD:  --All up on CBC performed today and initiate Procrit if hemoglobin is less than 9.5  --Titrate Procrit to achieve hemoglobin goal of 9.5-10.5   --Hold Procrit if hemoglobin greater than equal to 10.5  --Repeat CBC in 4 weeks

## 2018-01-24 LAB
ALBUMIN SERPL BCP-MCNC: 3.7 G/DL
ANION GAP SERPL CALC-SCNC: 13 MMOL/L
BUN SERPL-MCNC: 61 MG/DL
CALCIUM SERPL-MCNC: 9.4 MG/DL
CHLORIDE SERPL-SCNC: 108 MMOL/L
CO2 SERPL-SCNC: 25 MMOL/L
CREAT SERPL-MCNC: 3.1 MG/DL
EST. GFR  (AFRICAN AMERICAN): 16.8 ML/MIN/1.73 M^2
EST. GFR  (NON AFRICAN AMERICAN): 14.6 ML/MIN/1.73 M^2
GLUCOSE SERPL-MCNC: 51 MG/DL
PHOSPHATE SERPL-MCNC: 3.7 MG/DL
POTASSIUM SERPL-SCNC: 4.1 MMOL/L
SODIUM SERPL-SCNC: 146 MMOL/L

## 2018-01-25 LAB
CYSTATIN C SERPL-MCNC: 2.59 MG/L
GFR/BSA.PRED SERPLBLD CYS-BASED-ARV: 20 ML/MIN/BSA

## 2018-02-20 ENCOUNTER — OFFICE VISIT (OUTPATIENT)
Dept: HEMATOLOGY/ONCOLOGY | Facility: CLINIC | Age: 73
End: 2018-02-20
Payer: MEDICARE

## 2018-02-20 ENCOUNTER — OFFICE VISIT (OUTPATIENT)
Dept: NEPHROLOGY | Facility: CLINIC | Age: 73
End: 2018-02-20
Payer: MEDICARE

## 2018-02-20 ENCOUNTER — OFFICE VISIT (OUTPATIENT)
Dept: OPHTHALMOLOGY | Facility: CLINIC | Age: 73
End: 2018-02-20
Payer: MEDICARE

## 2018-02-20 ENCOUNTER — LAB VISIT (OUTPATIENT)
Dept: LAB | Facility: HOSPITAL | Age: 73
End: 2018-02-20
Attending: INTERNAL MEDICINE
Payer: MEDICARE

## 2018-02-20 VITALS
WEIGHT: 163.56 LBS | OXYGEN SATURATION: 100 % | TEMPERATURE: 99 F | HEIGHT: 65 IN | DIASTOLIC BLOOD PRESSURE: 68 MMHG | RESPIRATION RATE: 18 BRPM | BODY MASS INDEX: 27.25 KG/M2 | SYSTOLIC BLOOD PRESSURE: 146 MMHG | HEART RATE: 74 BPM

## 2018-02-20 VITALS
HEART RATE: 68 BPM | DIASTOLIC BLOOD PRESSURE: 66 MMHG | WEIGHT: 163.38 LBS | BODY MASS INDEX: 27.22 KG/M2 | SYSTOLIC BLOOD PRESSURE: 154 MMHG | HEIGHT: 65 IN

## 2018-02-20 DIAGNOSIS — N18.9 ANEMIA ASSOCIATED WITH CHRONIC RENAL FAILURE: Primary | ICD-10-CM

## 2018-02-20 DIAGNOSIS — I10 ESSENTIAL HYPERTENSION: ICD-10-CM

## 2018-02-20 DIAGNOSIS — N25.81 SECONDARY HYPERPARATHYROIDISM: ICD-10-CM

## 2018-02-20 DIAGNOSIS — D63.1 ANEMIA ASSOCIATED WITH CHRONIC RENAL FAILURE: Primary | ICD-10-CM

## 2018-02-20 DIAGNOSIS — N18.4 ANEMIA OF CHRONIC RENAL FAILURE, STAGE 4 (SEVERE): ICD-10-CM

## 2018-02-20 DIAGNOSIS — N18.9 ANEMIA ASSOCIATED WITH CHRONIC RENAL FAILURE: ICD-10-CM

## 2018-02-20 DIAGNOSIS — N18.4 CHRONIC KIDNEY DISEASE, STAGE IV (SEVERE): Primary | ICD-10-CM

## 2018-02-20 DIAGNOSIS — S39.92XA LOWER BACK INJURY, INITIAL ENCOUNTER: ICD-10-CM

## 2018-02-20 DIAGNOSIS — D63.1 ANEMIA IN STAGE 4 CHRONIC KIDNEY DISEASE: ICD-10-CM

## 2018-02-20 DIAGNOSIS — N18.4 ANEMIA IN STAGE 4 CHRONIC KIDNEY DISEASE: ICD-10-CM

## 2018-02-20 DIAGNOSIS — D64.9 NORMOCYTIC NORMOCHROMIC ANEMIA: ICD-10-CM

## 2018-02-20 DIAGNOSIS — N18.4 CHRONIC KIDNEY DISEASE (CKD) STAGE G4/A3, SEVERELY DECREASED GLOMERULAR FILTRATION RATE (GFR) BETWEEN 15-29 ML/MIN/1.73 SQUARE METER AND ALBUMINURIA CREATININE RATIO GREATER THAN 300 MG/G: ICD-10-CM

## 2018-02-20 DIAGNOSIS — D63.1 ANEMIA OF CHRONIC RENAL FAILURE, STAGE 4 (SEVERE): ICD-10-CM

## 2018-02-20 DIAGNOSIS — E11.29 PROTEINURIA DUE TO TYPE 2 DIABETES MELLITUS: ICD-10-CM

## 2018-02-20 DIAGNOSIS — Z79.4 TYPE 2 DIABETES MELLITUS WITH BOTH EYES AFFECTED BY PROLIFERATIVE RETINOPATHY WITHOUT MACULAR EDEMA, WITH LONG-TERM CURRENT USE OF INSULIN: Primary | ICD-10-CM

## 2018-02-20 DIAGNOSIS — R80.9 PROTEINURIA DUE TO TYPE 2 DIABETES MELLITUS: ICD-10-CM

## 2018-02-20 DIAGNOSIS — E11.21 DIABETIC NEPHROPATHY ASSOCIATED WITH TYPE 2 DIABETES MELLITUS: ICD-10-CM

## 2018-02-20 DIAGNOSIS — D63.1 ANEMIA ASSOCIATED WITH CHRONIC RENAL FAILURE: ICD-10-CM

## 2018-02-20 DIAGNOSIS — E55.9 VITAMIN D DEFICIENCY: ICD-10-CM

## 2018-02-20 DIAGNOSIS — E11.21 NEPHROTIC SYNDROME DUE TO DIABETES MELLITUS: ICD-10-CM

## 2018-02-20 DIAGNOSIS — E87.20 METABOLIC ACIDEMIA: ICD-10-CM

## 2018-02-20 DIAGNOSIS — E11.3593 TYPE 2 DIABETES MELLITUS WITH BOTH EYES AFFECTED BY PROLIFERATIVE RETINOPATHY WITHOUT MACULAR EDEMA, WITH LONG-TERM CURRENT USE OF INSULIN: Primary | ICD-10-CM

## 2018-02-20 LAB
ALBUMIN SERPL BCP-MCNC: 3.9 G/DL
ALP SERPL-CCNC: 119 U/L
ALT SERPL W/O P-5'-P-CCNC: 16 U/L
ANION GAP SERPL CALC-SCNC: 13 MMOL/L
AST SERPL-CCNC: 18 U/L
BASOPHILS # BLD AUTO: 0.01 K/UL
BASOPHILS NFR BLD: 0.1 %
BILIRUB SERPL-MCNC: 0.2 MG/DL
BUN SERPL-MCNC: 49 MG/DL
CALCIUM SERPL-MCNC: 9.3 MG/DL
CHLORIDE SERPL-SCNC: 109 MMOL/L
CO2 SERPL-SCNC: 24 MMOL/L
CREAT SERPL-MCNC: 2.9 MG/DL
DIFFERENTIAL METHOD: ABNORMAL
EOSINOPHIL # BLD AUTO: 0.1 K/UL
EOSINOPHIL NFR BLD: 1.2 %
ERYTHROCYTE [DISTWIDTH] IN BLOOD BY AUTOMATED COUNT: 13.2 %
EST. GFR  (AFRICAN AMERICAN): 18 ML/MIN/1.73 M^2
EST. GFR  (NON AFRICAN AMERICAN): 16 ML/MIN/1.73 M^2
FERRITIN SERPL-MCNC: 24 NG/ML
GLUCOSE SERPL-MCNC: 72 MG/DL
HCT VFR BLD AUTO: 30.7 %
HGB BLD-MCNC: 10.1 G/DL
IRON SERPL-MCNC: 59 UG/DL
LDH SERPL L TO P-CCNC: 281 U/L
LYMPHOCYTES # BLD AUTO: 1.2 K/UL
LYMPHOCYTES NFR BLD: 17.7 %
MCH RBC QN AUTO: 30.1 PG
MCHC RBC AUTO-ENTMCNC: 32.9 G/DL
MCV RBC AUTO: 91 FL
MONOCYTES # BLD AUTO: 0.4 K/UL
MONOCYTES NFR BLD: 5.2 %
NEUTROPHILS # BLD AUTO: 5.1 K/UL
NEUTROPHILS NFR BLD: 75.8 %
PLATELET # BLD AUTO: 185 K/UL
PMV BLD AUTO: 9.2 FL
POTASSIUM SERPL-SCNC: 4.3 MMOL/L
PROT SERPL-MCNC: 7.2 G/DL
RBC # BLD AUTO: 3.36 M/UL
RETICS/RBC NFR AUTO: 1.9 %
SATURATED IRON: 16 %
SODIUM SERPL-SCNC: 146 MMOL/L
TOTAL IRON BINDING CAPACITY: 369 UG/DL
TRANSFERRIN SERPL-MCNC: 249 MG/DL
WBC # BLD AUTO: 6.67 K/UL

## 2018-02-20 PROCEDURE — 85045 AUTOMATED RETICULOCYTE COUNT: CPT

## 2018-02-20 PROCEDURE — 99999 PR PBB SHADOW E&M-EST. PATIENT-LVL II: CPT | Mod: PBBFAC,,, | Performed by: OPHTHALMOLOGY

## 2018-02-20 PROCEDURE — 99214 OFFICE O/P EST MOD 30 MIN: CPT | Mod: S$GLB,,, | Performed by: INTERNAL MEDICINE

## 2018-02-20 PROCEDURE — 1126F AMNT PAIN NOTED NONE PRSNT: CPT | Mod: S$GLB,,, | Performed by: INTERNAL MEDICINE

## 2018-02-20 PROCEDURE — 82728 ASSAY OF FERRITIN: CPT

## 2018-02-20 PROCEDURE — 3008F BODY MASS INDEX DOCD: CPT | Mod: S$GLB,,, | Performed by: INTERNAL MEDICINE

## 2018-02-20 PROCEDURE — 99499 UNLISTED E&M SERVICE: CPT | Mod: S$GLB,,, | Performed by: INTERNAL MEDICINE

## 2018-02-20 PROCEDURE — 80053 COMPREHEN METABOLIC PANEL: CPT | Mod: PO

## 2018-02-20 PROCEDURE — 85025 COMPLETE CBC W/AUTO DIFF WBC: CPT | Mod: PO

## 2018-02-20 PROCEDURE — 92014 COMPRE OPH EXAM EST PT 1/>: CPT | Mod: S$GLB,,, | Performed by: OPHTHALMOLOGY

## 2018-02-20 PROCEDURE — 1159F MED LIST DOCD IN RCRD: CPT | Mod: S$GLB,,, | Performed by: INTERNAL MEDICINE

## 2018-02-20 PROCEDURE — 99999 PR PBB SHADOW E&M-EST. PATIENT-LVL III: CPT | Mod: PBBFAC,,, | Performed by: INTERNAL MEDICINE

## 2018-02-20 PROCEDURE — 83615 LACTATE (LD) (LDH) ENZYME: CPT | Mod: PO

## 2018-02-20 PROCEDURE — 36415 COLL VENOUS BLD VENIPUNCTURE: CPT | Mod: PO

## 2018-02-20 PROCEDURE — 83540 ASSAY OF IRON: CPT

## 2018-02-20 NOTE — PROGRESS NOTES
Subjective:       Patient ID: Avril Monzon is a 73 y.o. Black or  female who presents for re-evaluation of progression of Chronic Kidney Disease; Anemia; Vitamin D Deficiency; hyperparathyroid of renal origin; and Hypertension    Hypertension   Pertinent negatives include no chest pain, headaches, palpitations or shortness of breath.   Anemia   There has been no bruising/bleeding easily, fever or palpitations.        Patient is a 71 YO AAF with diabetic nephropathy for many years. Now has CKD-IV and Hx of retinopathy. Comes for follow up as a new patient to me     Has seen dr. Hanson in the past     10/2017 Rayaldee ; low sugars d/w patient     2/2018 off epo since 11/2017; re=aaply rayaldee     Review of Systems   Constitutional: Negative for activity change, appetite change, chills, diaphoresis, fatigue, fever and unexpected weight change.   HENT: Negative for congestion, dental problem, drooling, postnasal drip, rhinorrhea and voice change.    Eyes: Negative for discharge.   Respiratory: Negative for apnea, cough, choking, chest tightness, shortness of breath, wheezing and stridor.    Cardiovascular: Negative for chest pain, palpitations and leg swelling.   Gastrointestinal: Negative for abdominal distention, blood in stool, constipation, diarrhea, nausea, rectal pain and vomiting.   Endocrine: Negative for cold intolerance, heat intolerance, polydipsia and polyuria.   Genitourinary: Negative for decreased urine volume, difficulty urinating, dysuria, enuresis, flank pain, frequency, hematuria and urgency.   Musculoskeletal: Negative for arthralgias, back pain, gait problem and joint swelling.   Skin: Negative for rash.   Allergic/Immunologic: Negative for food allergies and immunocompromised state.   Neurological: Negative for dizziness, tremors, syncope, numbness and headaches.   Hematological: Does not bruise/bleed easily.   Psychiatric/Behavioral: Negative for agitation, behavioral problems  "and self-injury. The patient is not nervous/anxious and is not hyperactive.    All other systems reviewed and are negative.      Objective:   BP (!) 154/66   Pulse 68   Ht 5' 5" (1.651 m)   Wt 74.1 kg (163 lb 5.8 oz)   BMI 27.18 kg/m²      Physical Exam   Constitutional: She is oriented to person, place, and time. No distress.   HENT:   Head: Normocephalic and atraumatic.   Nose: Nose normal.   Eyes: Conjunctivae and EOM are normal. Pupils are equal, round, and reactive to light.   Neck: Normal range of motion. No JVD present. No tracheal deviation present. No thyromegaly present.   Cardiovascular: Normal rate, regular rhythm, normal heart sounds and intact distal pulses.  Exam reveals no gallop and no friction rub.    No murmur heard.  Pulmonary/Chest: Effort normal and breath sounds normal. No respiratory distress. She has no wheezes. She has no rales. She exhibits no tenderness.   Abdominal: Soft. Bowel sounds are normal. She exhibits no distension and no mass. There is no tenderness. No hernia.   Musculoskeletal: Normal range of motion. She exhibits no edema, tenderness or deformity.   Neurological: She is alert and oriented to person, place, and time. She has normal reflexes. She displays normal reflexes. No cranial nerve deficit. She exhibits normal muscle tone. Coordination normal.   Skin: Skin is warm. She is not diaphoretic. No erythema. There is pallor.   Psychiatric: She has a normal mood and affect. Her behavior is normal. Judgment and thought content normal.   Nursing note and vitals reviewed.        Lab Results   Component Value Date    CREATININE 2.9 (H) 02/20/2018    BUN 49 (H) 02/20/2018     (H) 02/20/2018    K 4.3 02/20/2018     02/20/2018    CO2 24 02/20/2018     Lab Results   Component Value Date    WBC 6.67 02/20/2018    HGB 10.1 (L) 02/20/2018    HCT 30.7 (L) 02/20/2018    MCV 91 02/20/2018     02/20/2018     Lab Results   Component Value Date    .0 (H) 01/23/2018 "    CALCIUM 9.3 02/20/2018    PHOS 3.7 01/23/2018     Vitamin D level is 8checked on January 2018    Assessment:    )    1. Chronic kidney disease, stage IV (severe)    2. Essential hypertension    3. Secondary hyperparathyroidism    4. Anemia of chronic renal failure, stage 4 (severe)    5. Proteinuria due to type 2 diabetes mellitus    6. Diabetic nephropathy associated with type 2 diabetes mellitus    7. Metabolic acidemia    8. Vitamin D deficiency    9. Nephrotic syndrome due to diabetes mellitus        Plan:         1. Chronic kidney Disease stage IV/V: GFR 16 -20 % cystatin C; avoid NSAids; patient's kidney function is about 10-15 percent.  She will need education class ; patient lives with daughter --possibly PD     2. HPT with severe vitamin D deficiency: Start on vitamin D  Rayaldee 30 microgram daily.   Calcium and phosphorus are stable    3. Anemia: doing well with procrit in heme dept     4. HTN : controlled     5. Protienuria: in remission      6:Acidosis: better on sodium bicarbonate    7: LBP: MRI reviewed : consult dr. Fenton    8.  Hyperkalemia: much better     9. Tonic water for leg cramps      10. type 2 diabetes with hypoglycemia:d/w patient to avoid low sugars       follow up in 3 months       Kim Doe MD

## 2018-02-22 DIAGNOSIS — D50.0 IRON DEFICIENCY ANEMIA DUE TO CHRONIC BLOOD LOSS: ICD-10-CM

## 2018-02-22 RX ORDER — SODIUM CHLORIDE 0.9 % (FLUSH) 0.9 %
10 SYRINGE (ML) INJECTION
Status: CANCELLED | OUTPATIENT
Start: 2018-02-22

## 2018-02-22 RX ORDER — HEPARIN 100 UNIT/ML
5 SYRINGE INTRAVENOUS
Status: CANCELLED | OUTPATIENT
Start: 2018-02-22

## 2018-02-22 RX ORDER — SODIUM CHLORIDE 9 MG/ML
INJECTION, SOLUTION INTRAVENOUS CONTINUOUS
Status: CANCELLED | OUTPATIENT
Start: 2018-02-22

## 2018-02-22 NOTE — PROGRESS NOTES
Hematology/Oncology Office Note    Reason for referral:  Normocytic anemia    CC:  Anemia    Referred by:  No ref. provider found    Diagnosis:  Normocytic anemia  CKD    Treatment:  Procrit 10,000 units biweekly      History of present illness:  71-year-old female with a history of diabetes mellitus, CKD, hypertension, and dyslipidemia who was been referred for progressive normocytic anemia.  Patient has significant CK D with baseline creatinine approximately 3.0.  Hemoglobin noted to be 9.3 on 6/14/16 and is trended down since 2012 when he was noted to be  11.3.  She reports fatigue, malaise, and cold intolerance, however, denies other significant symptoms.      I have reviewed and updated the HPI, ROS, PMHx, Social Hx, Family Hx and treatment history.      Today's visit:  Patient discontinued biweekly Procrit and 11/2017 due to social circumstances.  She presents today for routine follow-up and has no specific complaints.        Past Medical History:   Diagnosis Date    Anemia     Arthritis     back, hand, knees    Back pain     Cataract     CKD stage G4/A3, GFR 15 - 29 and albumin creatinine ratio >300 mg/g     GFR 40% Jan 2014 and 33% ub 3/2014 ([facility])    Diabetic retinopathy     DM (diabetes mellitus) type II controlled, neurological manifestation     GERD (gastroesophageal reflux disease)     Glaucoma     Hyperlipidemia     Hypertension     Peripheral neuropathy     Polyneuropathy     Proteinuria     >6 mo     Seizures     [facility] 1/2014 -dick CT NRI showed small vessel    Stroke 2012,2014    Tobacco dependence     Type 2 diabetes with peripheral circulatory disorder, controlled          Social History:  No tobacco, alcohol, or illicit drugs    Family History: family history includes Cancer in her maternal grandmother; Diabetes in her mother; Heart disease in her mother; Hyperlipidemia in her mother; Hypertension in her mother; Muscular dystrophy in her son.      HPI    Review of Systems  "  Constitutional: Negative for activity change, fatigue, fever and unexpected weight change.   HENT: Negative for congestion, drooling, ear discharge, facial swelling, mouth sores, nosebleeds and sneezing.    Eyes: Negative.    Respiratory: Negative for apnea, cough, shortness of breath and wheezing.    Cardiovascular: Negative for chest pain, palpitations and leg swelling.   Gastrointestinal: Negative for abdominal distention, abdominal pain, anal bleeding, diarrhea and nausea.   Endocrine: Negative.    Genitourinary: Negative for difficulty urinating, flank pain, genital sores and hematuria.   Musculoskeletal: Negative for arthralgias, back pain and neck pain.   Skin: Negative for rash and wound.   Allergic/Immunologic: Negative.    Neurological: Negative.  Negative for dizziness, facial asymmetry, weakness, numbness and headaches.   Hematological: Negative for adenopathy. Does not bruise/bleed easily.   Psychiatric/Behavioral: Negative for agitation, confusion, dysphoric mood, self-injury and sleep disturbance. The patient is not nervous/anxious and is not hyperactive.        Objective:       Vitals:    02/20/18 1159   BP: (!) 146/68   Pulse: 74   Resp: 18   Temp: 99.3 °F (37.4 °C)   TempSrc: Oral   SpO2: 100%   Weight: 74.2 kg (163 lb 9.3 oz)   Height: 5' 5" (1.651 m)     Physical Exam   Constitutional: She is oriented to person, place, and time. She appears well-developed and well-nourished. No distress.   Well groomed   HENT:   Head: Normocephalic and atraumatic.   Right Ear: External ear normal.   Left Ear: External ear normal.   Nose: Nose normal. Right sinus exhibits no maxillary sinus tenderness and no frontal sinus tenderness. Left sinus exhibits no maxillary sinus tenderness and no frontal sinus tenderness.   Mouth/Throat: Oropharynx is clear and moist and mucous membranes are normal. Mucous membranes are not pale and not dry. No oral lesions. Normal dentition. No oropharyngeal exudate.   Eyes: " Conjunctivae, EOM and lids are normal. Pupils are equal, round, and reactive to light. Lids are everted and swept, no foreign bodies found. Right eye exhibits no discharge.   Neck: Trachea normal and normal range of motion. Neck supple. No tracheal deviation present. No thyroid mass and no thyromegaly present.   No crepitus   Cardiovascular: Normal rate, regular rhythm, intact distal pulses and normal pulses.  Exam reveals no gallop and no friction rub.    Murmur heard.  Pulmonary/Chest: Effort normal and breath sounds normal. No respiratory distress. She has no wheezes. She has no rales. She exhibits no tenderness.   Abdominal: Soft. Normal appearance and bowel sounds are normal. She exhibits no distension. There is no hepatosplenomegaly. There is no tenderness. There is no guarding.   Musculoskeletal: Normal range of motion. She exhibits no tenderness.   Lymphadenopathy:        Head (right side): No submental and no submandibular adenopathy present.        Head (left side): No submental and no submandibular adenopathy present.     She has no cervical adenopathy.     She has no axillary adenopathy.   Neurological: She is alert and oriented to person, place, and time. No cranial nerve deficit. Coordination normal.   Skin: Skin is warm, dry and intact. Capillary refill takes less than 2 seconds. No abrasion, no bruising and no rash noted. She is not diaphoretic.   Psychiatric: She has a normal mood and affect. Her behavior is normal.       Lab Results   Component Value Date    WBC 6.67 02/20/2018    HGB 10.1 (L) 02/20/2018    HCT 30.7 (L) 02/20/2018    MCV 91 02/20/2018     02/20/2018     Lab Results   Component Value Date    CREATININE 2.9 (H) 02/20/2018    BUN 49 (H) 02/20/2018     (H) 02/20/2018    K 4.3 02/20/2018     02/20/2018    CO2 24 02/20/2018     Lab Results   Component Value Date    ALT 16 02/20/2018    AST 18 02/20/2018    ALKPHOS 119 02/20/2018    BILITOT 0.2 02/20/2018          Assessment:       71-year-old female with progressive normocytic anemia likely secondary to CKD.  Workup has also revealed a relative iron deficiency with a ferritin of 20 and 12% iron saturation.  Therefore, she received Feraheme 510 mg ×2 doses given 7/2016.  Unfortunately the anemia continued to progress,  therefore, we will proceeded with Procrit 10,000 units every 2 weeks (started 5/2017).  She has responded appropriately to Procrit.    Unfortunately she discontinued Procrit in 10/2017 due to social circumstances with an ill .   Her hemoglobin remains greater than 10 (10.1) while she has not had Procrit since 11/2017.  We will continue to monitor and plan to reinitiate Procrit when hemoglobin falls below 9.5.  Follow-up on iron studies performed this morning.      Normocytic anemia: Secondary to CKD:  --Continue to monitor CBC and plan to initiate Procrit when hemoglobin falls below 9.  --Titrate Procrit to achieve hemoglobin goal of 9.5-10.5   --Hold Procrit if hemoglobin greater than equal to 10.5  --Repeat CBC in 6 weeks

## 2018-03-02 ENCOUNTER — INFUSION (OUTPATIENT)
Dept: INFUSION THERAPY | Facility: HOSPITAL | Age: 73
End: 2018-03-02
Attending: INTERNAL MEDICINE
Payer: MEDICARE

## 2018-03-02 VITALS
HEART RATE: 64 BPM | SYSTOLIC BLOOD PRESSURE: 169 MMHG | OXYGEN SATURATION: 97 % | DIASTOLIC BLOOD PRESSURE: 69 MMHG | TEMPERATURE: 99 F | RESPIRATION RATE: 18 BRPM

## 2018-03-02 DIAGNOSIS — D50.0 IRON DEFICIENCY ANEMIA DUE TO CHRONIC BLOOD LOSS: Primary | ICD-10-CM

## 2018-03-02 PROCEDURE — 25000003 PHARM REV CODE 250: Mod: PO | Performed by: INTERNAL MEDICINE

## 2018-03-02 PROCEDURE — 96365 THER/PROPH/DIAG IV INF INIT: CPT | Mod: PO

## 2018-03-02 PROCEDURE — 63600175 PHARM REV CODE 636 W HCPCS: Mod: JG,PO | Performed by: INTERNAL MEDICINE

## 2018-03-02 RX ORDER — SODIUM CHLORIDE 0.9 % (FLUSH) 0.9 %
10 SYRINGE (ML) INJECTION
Status: CANCELLED | OUTPATIENT
Start: 2018-03-02

## 2018-03-02 RX ORDER — HEPARIN 100 UNIT/ML
5 SYRINGE INTRAVENOUS
Status: CANCELLED | OUTPATIENT
Start: 2018-03-02

## 2018-03-02 RX ORDER — SODIUM CHLORIDE 9 MG/ML
INJECTION, SOLUTION INTRAVENOUS CONTINUOUS
Status: CANCELLED | OUTPATIENT
Start: 2018-03-02

## 2018-03-02 RX ADMIN — FERRIC CARBOXYMALTOSE INJECTION 750 MG: 50 INJECTION, SOLUTION INTRAVENOUS at 01:03

## 2018-03-02 NOTE — PATIENT INSTRUCTIONS
[facility]  [address]  [phone] phone     [phone] fax  Hours of Operation: Monday- Friday 8:00am- 5:00pm  After hours phone  [phone]  Hematology / Oncology Physicians on call      Dr. Doug Fletcher                        Please call with any concerns regarding your appointment today.

## 2018-03-02 NOTE — PLAN OF CARE
Problem: Patient Care Overview  Goal: Plan of Care Review  Outcome: Ongoing (interventions implemented as appropriate)  I feel fine.

## 2018-03-07 NOTE — TELEPHONE ENCOUNTER
----- Message from Paula Mon sent at 3/7/2018  9:48 AM CST -----  Contact: Kristen/Holland Peterson stated that she was not able to reach the patient and wants to know if there is a pharmacy on file for her, Please call her at [phone].    Thanks  td

## 2018-03-08 ENCOUNTER — TELEPHONE (OUTPATIENT)
Dept: NEPHROLOGY | Facility: CLINIC | Age: 73
End: 2018-03-08

## 2018-03-08 NOTE — TELEPHONE ENCOUNTER
----- Message from Jose Razo sent at 3/8/2018  8:59 AM CST -----  Contact: Avril Campos pt at [phone] (home)   Pt is having some issues regarding medication.

## 2018-03-08 NOTE — TELEPHONE ENCOUNTER
----- Message from Rodrigue Bloom sent at 3/8/2018  2:49 PM CST -----  Contact: pt  She's calling in regards to her Rx medication, pls advise, [phone] (home)

## 2018-03-08 NOTE — TELEPHONE ENCOUNTER
Returned call to patient who states that she wanted to know if she should take the Ergo once a week . Advised her no since she has received her Rayaldee now to continue taking that. She expressed understanding.

## 2018-03-13 ENCOUNTER — INFUSION (OUTPATIENT)
Dept: INFUSION THERAPY | Facility: HOSPITAL | Age: 73
End: 2018-03-13
Attending: INTERNAL MEDICINE
Payer: MEDICARE

## 2018-03-13 VITALS
WEIGHT: 163.38 LBS | TEMPERATURE: 98 F | DIASTOLIC BLOOD PRESSURE: 72 MMHG | OXYGEN SATURATION: 99 % | HEART RATE: 67 BPM | BODY MASS INDEX: 27.18 KG/M2 | SYSTOLIC BLOOD PRESSURE: 151 MMHG

## 2018-03-13 DIAGNOSIS — D50.0 IRON DEFICIENCY ANEMIA DUE TO CHRONIC BLOOD LOSS: Primary | ICD-10-CM

## 2018-03-13 PROCEDURE — 25000003 PHARM REV CODE 250: Mod: PO | Performed by: INTERNAL MEDICINE

## 2018-03-13 PROCEDURE — A4216 STERILE WATER/SALINE, 10 ML: HCPCS | Mod: PO | Performed by: INTERNAL MEDICINE

## 2018-03-13 PROCEDURE — 63600175 PHARM REV CODE 636 W HCPCS: Mod: JG,PO | Performed by: INTERNAL MEDICINE

## 2018-03-13 PROCEDURE — 96365 THER/PROPH/DIAG IV INF INIT: CPT | Mod: PO

## 2018-03-13 RX ORDER — HEPARIN 100 UNIT/ML
5 SYRINGE INTRAVENOUS
Status: CANCELLED | OUTPATIENT
Start: 2018-03-13

## 2018-03-13 RX ORDER — SODIUM CHLORIDE 9 MG/ML
INJECTION, SOLUTION INTRAVENOUS CONTINUOUS
Status: CANCELLED | OUTPATIENT
Start: 2018-03-13

## 2018-03-13 RX ORDER — SODIUM CHLORIDE 0.9 % (FLUSH) 0.9 %
10 SYRINGE (ML) INJECTION
Status: DISCONTINUED | OUTPATIENT
Start: 2018-03-13 | End: 2018-03-13 | Stop reason: HOSPADM

## 2018-03-13 RX ORDER — SODIUM CHLORIDE 0.9 % (FLUSH) 0.9 %
10 SYRINGE (ML) INJECTION
Status: CANCELLED | OUTPATIENT
Start: 2018-03-13

## 2018-03-13 RX ADMIN — SODIUM CHLORIDE, PRESERVATIVE FREE 10 ML: 5 INJECTION INTRAVENOUS at 02:03

## 2018-03-13 RX ADMIN — FERRIC CARBOXYMALTOSE INJECTION 750 MG: 50 INJECTION, SOLUTION INTRAVENOUS at 01:03

## 2018-03-13 NOTE — PLAN OF CARE
Problem: Patient Care Overview  Goal: Plan of Care Review  Outcome: Ongoing (interventions implemented as appropriate)  Pt states she is doing ok

## 2018-04-09 RX ORDER — AMLODIPINE BESYLATE 10 MG/1
TABLET ORAL
Qty: 30 TABLET | Refills: 11 | Status: ON HOLD | OUTPATIENT
Start: 2018-04-09 | End: 2018-12-04 | Stop reason: HOSPADM

## 2018-04-28 ENCOUNTER — HOSPITAL ENCOUNTER (EMERGENCY)
Facility: HOSPITAL | Age: 73
Discharge: HOME | End: 2018-04-28
Payer: MEDICARE

## 2018-04-28 VITALS
DIASTOLIC BLOOD PRESSURE: 70 MMHG | TEMPERATURE: 99 F | RESPIRATION RATE: 20 BRPM | HEART RATE: 77 BPM | WEIGHT: 157.88 LBS | BODY MASS INDEX: 26.3 KG/M2 | OXYGEN SATURATION: 98 % | HEIGHT: 65 IN | SYSTOLIC BLOOD PRESSURE: 170 MMHG

## 2018-04-28 DIAGNOSIS — I10 ELEVATED BLOOD PRESSURE READING WITH DIAGNOSIS OF HYPERTENSION: ICD-10-CM

## 2018-04-28 DIAGNOSIS — R09.81 NASAL CONGESTION: Primary | ICD-10-CM

## 2018-04-28 PROCEDURE — 99282 EMERGENCY DEPT VISIT SF MDM: CPT

## 2018-04-29 NOTE — ED PROVIDER NOTES
SCRIBE #1 NOTE: I, Luis Armando Hooker, am scribing for, and in the presence of, Monica Stevens PA-C. I have scribed the entire note.      History      Chief Complaint   Patient presents with    Nasal Congestion     patient states hasnt been feeling good and started with nasal congestion today       Review of patient's allergies indicates:   Allergen Reactions    Codeine Swelling    Darvon [propoxyphene] Swelling        HPI   HPI    4/28/2018, 9:52 PM   History obtained from the patient      History of Present Illness: Avril Monzon is a 73 y.o. female patient with a hx of DM, HTN, and CKD who presents to the Emergency Department for an evaluation of nasal congestion which onset gradually a few days ago. Pt reports she also noticed blood when she blew her nose but denies it being constant. Symptoms are constant and moderate in severity. Exacerbated by nothing and relieved by nothing. Associated sxs include small amount of blood coming from nose (x2 episodes) and congestion. Patient denies any fever, chills, cough, sore throat, CP, abd pain, dysuria, and all other sxs at this time. Pt denies tx PTA. No further complaints or concerns at this time.     Arrival mode: Personal vehicle    PCP: Rose Parks MD       Past Medical History:  Past Medical History:   Diagnosis Date    Anemia     Arthritis     back, hand, knees    Back pain     Cataract     CKD stage G4/A3, GFR 15 - 29 and albumin creatinine ratio >300 mg/g     GFR 40% Jan 2014 and 33% ub 3/2014 (BRG)    Diabetic retinopathy     DM (diabetes mellitus) type II controlled, neurological manifestation     GERD (gastroesophageal reflux disease)     Glaucoma     Hyperlipidemia     Hypertension     Peripheral neuropathy     Polyneuropathy     Proteinuria     >6 mo     Seizures     [facility] 1/2014 -dick CT NRI showed small vessel    Stroke 2012,2014    Tobacco dependence     Type 2 diabetes with peripheral circulatory disorder, controlled        Past  Surgical History:  Past Surgical History:   Procedure Laterality Date    BREAST MASS EXCISION      ,benign, age 15.    HYSTERECTOMY  1982    wrist cyst Right 1980s    dorsal wrist cyst         Family History:  Family History   Problem Relation Age of Onset    Diabetes Mother     Hyperlipidemia Mother     Hypertension Mother     Heart disease Mother      MI    Muscular dystrophy Son     Cancer Maternal Grandmother      colon       Social History:  Social History     Social History Main Topics    Smoking status: Former Smoker     Packs/day: 1.50     Years: 30.00     Types: Cigarettes     Quit date: 1/9/1990    Smokeless tobacco: Former User    Alcohol use No    Drug use: No    Sexual activity: No       ROS   Review of Systems   Constitutional: Negative for chills and fever.   HENT: Positive for congestion, nosebleeds (Small amounts) and rhinorrhea. Negative for ear pain, sinus pain, sinus pressure, sneezing, sore throat and trouble swallowing.    Respiratory: Negative for cough and shortness of breath.    Cardiovascular: Negative for chest pain.   Gastrointestinal: Negative for abdominal pain, nausea and vomiting.   Genitourinary: Negative for dysuria and hematuria.   Musculoskeletal: Negative for back pain and neck pain.   Skin: Negative for rash.   Neurological: Negative for dizziness, light-headedness and headaches.     Physical Exam      Initial Vitals [04/28/18 2145]   BP Pulse Resp Temp SpO2   (!) 170/70 77 20 98.9 °F (37.2 °C) 98 %      MAP       103.33          Physical Exam  Nursing Notes and Vital Signs Reviewed.  Constitutional: Patient is in no acute distress. Well-developed and well-nourished.  Head: Atraumatic. Normocephalic.  Eyes: PERRL. EOM intact. Conjunctivae are not pale. No scleral icterus.  ENT: Mucous membranes are moist. No active bleeding noted to bilateral nares.   Neck: Supple. Full ROM. No lymphadenopathy.  Cardiovascular: Regular rate. Regular rhythm.  Pulmonary/Chest: No  "respiratory distress. Clear to auscultation bilaterally. No wheezing or rales.  Abdominal: Soft and non-distended.  Musculoskeletal: Moves all extremities. No obvious deformities.  Skin: Warm and dry.  Neurological:  Alert, awake, and appropriate.  Normal speech.  No acute focal neurological deficits are appreciated.  Psychiatric: Normal affect. Good eye contact. Appropriate in content.    ED Course    Procedures  ED Vital Signs:  Vitals:    04/28/18 2145   BP: (!) 170/70   Pulse: 77   Resp: 20   Temp: 98.9 °F (37.2 °C)   TempSrc: Oral   SpO2: 98%   Weight: 71.6 kg (157 lb 13.6 oz)   Height: 5' 5" (1.651 m)            The Emergency Provider reviewed the vital signs and test results, which are outlined above.    ED Discussion     10:10 PM: Reassessed pt at this time. Pt is awake, alert, and in NAD. Discussed with pt all pertinent ED information. Discussed pt dx and plan of tx. Gave pt all f/u and return to the ED instructions. All questions and concerns were addressed at this time. Pt expresses understanding of information and instructions, and is comfortable with plan to discharge. Pt is stable for discharge.    I discussed with patient and/or family/caretaker that evaluation in the ED does not suggest any emergent or life threatening medical conditions requiring immediate intervention beyond what was provided in the ED, and I believe patient is safe for discharge.  Regardless, an unremarkable evaluation in the ED does not preclude the development or presence of a serious of life threatening condition. As such, patient was instructed to return immediately for any worsening or change in current symptoms.      ED Medication(s):  Medications - No data to display    Discharge Medication List as of 4/28/2018 10:07 PM          Follow-up Information     Rose Parks MD In 3 days.    Specialty:  Internal Medicine  Contact information:  [address]  [place] [address] [phone]                     Medical " Decision Making              Scribe Attestation:   Scribe #1: I performed the above scribed service and the documentation accurately describes the services I performed. I attest to the accuracy of the note.    Attending:   Physician Attestation Statement for Scribe #1: I, Monica Stevens PA-C, personally performed the services described in this documentation, as scribed by Luis Armando Hooker, in my presence, and it is both accurate and complete.          Clinical Impression       ICD-10-CM ICD-9-CM   1. Nasal congestion R09.81 478.19   2. Elevated blood pressure reading with diagnosis of hypertension I10 401.9       Disposition:   Disposition: Discharged  Condition: Stable         Monica Stevens PA-C  04/29/18 0118

## 2018-05-15 ENCOUNTER — OFFICE VISIT (OUTPATIENT)
Dept: HEMATOLOGY/ONCOLOGY | Facility: CLINIC | Age: 73
End: 2018-05-15
Payer: MEDICARE

## 2018-05-15 ENCOUNTER — DOCUMENTATION ONLY (OUTPATIENT)
Dept: RESEARCH | Facility: HOSPITAL | Age: 73
End: 2018-05-15

## 2018-05-15 ENCOUNTER — LAB VISIT (OUTPATIENT)
Dept: LAB | Facility: HOSPITAL | Age: 73
End: 2018-05-15
Attending: INTERNAL MEDICINE
Payer: MEDICARE

## 2018-05-15 ENCOUNTER — OFFICE VISIT (OUTPATIENT)
Dept: NEPHROLOGY | Facility: CLINIC | Age: 73
End: 2018-05-15
Payer: MEDICARE

## 2018-05-15 VITALS
BODY MASS INDEX: 26.45 KG/M2 | WEIGHT: 158.75 LBS | HEIGHT: 65 IN | SYSTOLIC BLOOD PRESSURE: 142 MMHG | HEART RATE: 64 BPM | DIASTOLIC BLOOD PRESSURE: 50 MMHG

## 2018-05-15 VITALS
HEART RATE: 64 BPM | SYSTOLIC BLOOD PRESSURE: 142 MMHG | OXYGEN SATURATION: 99 % | WEIGHT: 158.75 LBS | DIASTOLIC BLOOD PRESSURE: 50 MMHG | TEMPERATURE: 98 F | BODY MASS INDEX: 26.45 KG/M2 | HEIGHT: 65 IN | RESPIRATION RATE: 18 BRPM

## 2018-05-15 DIAGNOSIS — E55.9 VITAMIN D DEFICIENCY: ICD-10-CM

## 2018-05-15 DIAGNOSIS — N18.4 ANEMIA IN STAGE 4 CHRONIC KIDNEY DISEASE: ICD-10-CM

## 2018-05-15 DIAGNOSIS — E11.29 PROTEINURIA DUE TO TYPE 2 DIABETES MELLITUS: ICD-10-CM

## 2018-05-15 DIAGNOSIS — D63.1 ANEMIA IN STAGE 4 CHRONIC KIDNEY DISEASE: ICD-10-CM

## 2018-05-15 DIAGNOSIS — D63.1 ANEMIA ASSOCIATED WITH CHRONIC RENAL FAILURE: ICD-10-CM

## 2018-05-15 DIAGNOSIS — N18.4 CHRONIC KIDNEY DISEASE (CKD) STAGE G4/A3, SEVERELY DECREASED GLOMERULAR FILTRATION RATE (GFR) BETWEEN 15-29 ML/MIN/1.73 SQUARE METER AND ALBUMINURIA CREATININE RATIO GREATER THAN 300 MG/G: Primary | ICD-10-CM

## 2018-05-15 DIAGNOSIS — N18.9 ANEMIA ASSOCIATED WITH CHRONIC RENAL FAILURE: ICD-10-CM

## 2018-05-15 DIAGNOSIS — N18.4 ANEMIA OF CHRONIC RENAL FAILURE, STAGE 4 (SEVERE): ICD-10-CM

## 2018-05-15 DIAGNOSIS — N18.4 CHRONIC KIDNEY DISEASE, STAGE IV (SEVERE): Primary | ICD-10-CM

## 2018-05-15 DIAGNOSIS — E11.21 DIABETIC NEPHROPATHY ASSOCIATED WITH TYPE 2 DIABETES MELLITUS: ICD-10-CM

## 2018-05-15 DIAGNOSIS — D63.1 ANEMIA OF CHRONIC RENAL FAILURE, STAGE 4 (SEVERE): ICD-10-CM

## 2018-05-15 DIAGNOSIS — E87.20 METABOLIC ACIDEMIA: ICD-10-CM

## 2018-05-15 DIAGNOSIS — D64.9 NORMOCYTIC NORMOCHROMIC ANEMIA: ICD-10-CM

## 2018-05-15 DIAGNOSIS — E11.21 NEPHROTIC SYNDROME DUE TO DIABETES MELLITUS: ICD-10-CM

## 2018-05-15 DIAGNOSIS — R80.9 PROTEINURIA DUE TO TYPE 2 DIABETES MELLITUS: ICD-10-CM

## 2018-05-15 DIAGNOSIS — D64.9 ANEMIA, UNSPECIFIED TYPE: ICD-10-CM

## 2018-05-15 DIAGNOSIS — N25.81 SECONDARY HYPERPARATHYROIDISM: ICD-10-CM

## 2018-05-15 DIAGNOSIS — Z00.6 RESEARCH EXAM: ICD-10-CM

## 2018-05-15 DIAGNOSIS — I10 ESSENTIAL HYPERTENSION: ICD-10-CM

## 2018-05-15 DIAGNOSIS — D50.0 IRON DEFICIENCY ANEMIA DUE TO CHRONIC BLOOD LOSS: Primary | ICD-10-CM

## 2018-05-15 LAB
ALBUMIN SERPL BCP-MCNC: 3.7 G/DL
ALP SERPL-CCNC: 98 U/L
ALT SERPL W/O P-5'-P-CCNC: 24 U/L
ANION GAP SERPL CALC-SCNC: 11 MMOL/L
AST SERPL-CCNC: 21 U/L
BASOPHILS # BLD AUTO: 0.01 K/UL
BASOPHILS NFR BLD: 0.2 %
BILIRUB SERPL-MCNC: 0.3 MG/DL
BUN SERPL-MCNC: 54 MG/DL
CALCIUM SERPL-MCNC: 9.4 MG/DL
CHLORIDE SERPL-SCNC: 109 MMOL/L
CO2 SERPL-SCNC: 22 MMOL/L
CREAT SERPL-MCNC: 2.9 MG/DL
DIFFERENTIAL METHOD: ABNORMAL
EOSINOPHIL # BLD AUTO: 0.1 K/UL
EOSINOPHIL NFR BLD: 2.5 %
ERYTHROCYTE [DISTWIDTH] IN BLOOD BY AUTOMATED COUNT: 13.1 %
EST. GFR  (AFRICAN AMERICAN): 18 ML/MIN/1.73 M^2
EST. GFR  (NON AFRICAN AMERICAN): 16 ML/MIN/1.73 M^2
FERRITIN SERPL-MCNC: 555 NG/ML
GLUCOSE SERPL-MCNC: 144 MG/DL
HCT VFR BLD AUTO: 31.3 %
HGB BLD-MCNC: 10.3 G/DL
IRON SERPL-MCNC: 69 UG/DL
LDH SERPL L TO P-CCNC: 233 U/L
LYMPHOCYTES # BLD AUTO: 1.4 K/UL
LYMPHOCYTES NFR BLD: 29.6 %
MCH RBC QN AUTO: 30.5 PG
MCHC RBC AUTO-ENTMCNC: 32.9 G/DL
MCV RBC AUTO: 93 FL
MONOCYTES # BLD AUTO: 0.1 K/UL
MONOCYTES NFR BLD: 1 %
NEUTROPHILS # BLD AUTO: 3.2 K/UL
NEUTROPHILS NFR BLD: 66.7 %
PLATELET # BLD AUTO: 159 K/UL
PMV BLD AUTO: 9.1 FL
POTASSIUM SERPL-SCNC: 4.7 MMOL/L
PROT SERPL-MCNC: 6.6 G/DL
RBC # BLD AUTO: 3.38 M/UL
RETICS/RBC NFR AUTO: 1.5 %
SATURATED IRON: 27 %
SODIUM SERPL-SCNC: 142 MMOL/L
TOTAL IRON BINDING CAPACITY: 253 UG/DL
TRANSFERRIN SERPL-MCNC: 171 MG/DL
WBC # BLD AUTO: 4.86 K/UL

## 2018-05-15 PROCEDURE — 3077F SYST BP >= 140 MM HG: CPT | Mod: CPTII,S$GLB,, | Performed by: INTERNAL MEDICINE

## 2018-05-15 PROCEDURE — 80053 COMPREHEN METABOLIC PANEL: CPT | Mod: PO

## 2018-05-15 PROCEDURE — 3078F DIAST BP <80 MM HG: CPT | Mod: CPTII,S$GLB,, | Performed by: INTERNAL MEDICINE

## 2018-05-15 PROCEDURE — 99214 OFFICE O/P EST MOD 30 MIN: CPT | Mod: S$GLB,,, | Performed by: INTERNAL MEDICINE

## 2018-05-15 PROCEDURE — 99499 UNLISTED E&M SERVICE: CPT | Mod: S$PBB,,, | Performed by: INTERNAL MEDICINE

## 2018-05-15 PROCEDURE — 83540 ASSAY OF IRON: CPT

## 2018-05-15 PROCEDURE — 82728 ASSAY OF FERRITIN: CPT

## 2018-05-15 PROCEDURE — 85045 AUTOMATED RETICULOCYTE COUNT: CPT

## 2018-05-15 PROCEDURE — 83615 LACTATE (LD) (LDH) ENZYME: CPT | Mod: PO

## 2018-05-15 PROCEDURE — 85025 COMPLETE CBC W/AUTO DIFF WBC: CPT | Mod: PO

## 2018-05-15 PROCEDURE — 36415 COLL VENOUS BLD VENIPUNCTURE: CPT | Mod: PO

## 2018-05-15 PROCEDURE — 99999 PR PBB SHADOW E&M-EST. PATIENT-LVL III: CPT | Mod: PBBFAC,,, | Performed by: INTERNAL MEDICINE

## 2018-05-15 PROCEDURE — 3044F HG A1C LEVEL LT 7.0%: CPT | Mod: CPTII,S$GLB,, | Performed by: INTERNAL MEDICINE

## 2018-05-15 NOTE — PROGRESS NOTES
Hematology/Oncology Office Note    Reason for referral:  Normocytic anemia    CC:  Anemia    Referred by:  No ref. provider found    Diagnosis:  Normocytic anemia  CKD    Treatment:  Procrit 10,000 units biweekly      History of present illness:  71-year-old female with a history of diabetes mellitus, CKD, hypertension, and dyslipidemia who was been referred for progressive normocytic anemia.  Patient has significant CK D with baseline creatinine approximately 3.0.  Hemoglobin noted to be 9.3 on 6/14/16 and is trended down since 2012 when he was noted to be  11.3.  She reports fatigue, malaise, and cold intolerance, however, denies other significant symptoms.      I have reviewed and updated the HPI, ROS, PMHx, Social Hx, Family Hx and treatment history.      Today's visit:  Patient is without complaints today.  She specifically denies fever, chills, dizziness, palpitations, or chest pain.    Past Medical History:   Diagnosis Date    Anemia     Arthritis     back, hand, knees    Back pain     Cataract     CKD stage G4/A3, GFR 15 - 29 and albumin creatinine ratio >300 mg/g     GFR 40% Jan 2014 and 33% ub 3/2014 ([facility]G)    Diabetic retinopathy     DM (diabetes mellitus) type II controlled, neurological manifestation     GERD (gastroesophageal reflux disease)     Glaucoma     Hyperlipidemia     Hypertension     Peripheral neuropathy     Polyneuropathy     Proteinuria     >6 mo     Seizures     BRG 1/2014 -dick CT NRI showed small vessel    Stroke 2012,2014    Tobacco dependence     Type 2 diabetes with peripheral circulatory disorder, controlled          Social History:  No tobacco, alcohol, or illicit drugs    Family History: family history includes Cancer in her maternal grandmother; Diabetes in her mother; Heart disease in her mother; Hyperlipidemia in her mother; Hypertension in her mother; Muscular dystrophy in her son.      HPI    Review of Systems   Constitutional: Negative for activity change,  "fatigue, fever and unexpected weight change.   HENT: Negative for congestion, dental problem, drooling, ear discharge, ear pain, facial swelling, mouth sores and nosebleeds.    Eyes: Negative.    Respiratory: Negative for apnea, cough, choking, chest tightness, shortness of breath and wheezing.    Cardiovascular: Negative for chest pain, palpitations and leg swelling.   Gastrointestinal: Negative for abdominal distention, abdominal pain, anal bleeding, blood in stool, diarrhea and nausea.   Endocrine: Negative.    Genitourinary: Negative for difficulty urinating, dyspareunia, dysuria, enuresis, flank pain, genital sores and hematuria.   Musculoskeletal: Negative for arthralgias, back pain and neck pain.   Skin: Negative for color change, pallor and rash.   Allergic/Immunologic: Negative.    Neurological: Negative.  Negative for dizziness, facial asymmetry, numbness and headaches.   Hematological: Negative for adenopathy. Does not bruise/bleed easily.   Psychiatric/Behavioral: Negative for agitation, behavioral problems, confusion, decreased concentration and dysphoric mood. The patient is not nervous/anxious and is not hyperactive.        Objective:       Vitals:    05/15/18 1116   BP: (!) 142/50   Pulse: 64   Resp: 18   Temp: 97.8 °F (36.6 °C)   TempSrc: Oral   SpO2: 99%   Weight: 72 kg (158 lb 11.7 oz)   Height: 5' 5" (1.651 m)     Physical Exam   Constitutional: She is oriented to person, place, and time. She appears well-developed and well-nourished. No distress.   Well groomed   HENT:   Head: Normocephalic and atraumatic.   Right Ear: External ear normal.   Left Ear: External ear normal.   Nose: Nose normal. Right sinus exhibits no maxillary sinus tenderness and no frontal sinus tenderness. Left sinus exhibits no maxillary sinus tenderness and no frontal sinus tenderness.   Mouth/Throat: Oropharynx is clear and moist and mucous membranes are normal. Mucous membranes are not pale and not dry. No oral lesions. " Normal dentition. No oropharyngeal exudate.   Eyes: Conjunctivae, EOM and lids are normal. Pupils are equal, round, and reactive to light. Lids are everted and swept, no foreign bodies found. Right eye exhibits no discharge.   Neck: Trachea normal and normal range of motion. Neck supple. No tracheal deviation present. No thyroid mass and no thyromegaly present.   No crepitus   Cardiovascular: Normal rate, regular rhythm, intact distal pulses and normal pulses.  Exam reveals no gallop and no friction rub.    Murmur heard.  Pulmonary/Chest: Effort normal and breath sounds normal. No respiratory distress. She has no wheezes. She has no rales. She exhibits no tenderness.   Abdominal: Soft. Normal appearance and bowel sounds are normal. She exhibits no distension. There is no hepatosplenomegaly. There is no tenderness. There is no guarding.   Musculoskeletal: Normal range of motion. She exhibits no tenderness.   Lymphadenopathy:        Head (right side): No submental and no submandibular adenopathy present.        Head (left side): No submental and no submandibular adenopathy present.     She has no cervical adenopathy.     She has no axillary adenopathy.   Neurological: She is alert and oriented to person, place, and time. No cranial nerve deficit. Coordination normal.   Skin: Skin is warm, dry and intact. Capillary refill takes less than 2 seconds. No abrasion, no bruising and no rash noted. She is not diaphoretic.   Psychiatric: She has a normal mood and affect. Her behavior is normal.       Lab Results   Component Value Date    WBC 4.86 05/15/2018    HGB 10.3 (L) 05/15/2018    HCT 31.3 (L) 05/15/2018    MCV 93 05/15/2018     05/15/2018     Lab Results   Component Value Date    CREATININE 2.9 (H) 05/15/2018    BUN 54 (H) 05/15/2018     05/15/2018    K 4.7 05/15/2018     05/15/2018    CO2 22 (L) 05/15/2018     Lab Results   Component Value Date    ALT 24 05/15/2018    AST 21 05/15/2018    ALKPHOS 98  05/15/2018    BILITOT 0.3 05/15/2018         Assessment:       71-year-old female with progressive normocytic anemia likely secondary to CKD.  Workup has also revealed a relative iron deficiency with a ferritin of 20 and 12% iron saturation.  Therefore, she received Feraheme 510 mg ×2 doses given 7/2016.  Procrit 10,000 units every 2 weeks (started 5/2017).  She has responded appropriately to Procrit.    Unfortunately she discontinued Procrit in 10/2017 due to social circumstances with an ill .   Her hemoglobin remains greater than 10 (10.3) while she has not had Procrit since 11/2017.   Iron deficiency has been treated with Injectafer 750 mg IV ×2 doses given in 3/2018.  We will refer the patient to GI clinic to evaluate for endoscopies.   She ill follow-up in 2 months with repeat CBC/iron studies       iron deficiency:  -- status post Injectafer in 3/2018  -- refer to GI clinic to evaluate for endoscopies  -- repeat CBC iron studies and 2 months    Normocytic anemia: Secondary to CKD:  --Continue to monitor CBC and plan to initiate Procrit when hemoglobin falls below 9.  --Titrate Procrit to achieve hemoglobin goal of 9.5-10.5   --Hold Procrit if hemoglobin greater than equal to 10.5  --Repeat CBC in 2 weeks

## 2018-05-15 NOTE — PROGRESS NOTES
PROTOCOL: [facility] ASCEND-  SUBJECT INITIALS: LWW      Subject was here today to consent for the [facility] ASCEND- clinical trial for patients with chronic kidney disease and anemia. She was in clinic for a follow up visit with her nephrologist Dr. Doe. He mentioned the study to her and thought she would be a good candidate for the trial. Upon her arrival she was taken to a private exam room and made aware that Dr. Doe was available to answer any questions she had prior to signing consent. She had no questions at this time. She was offered the Main Study Consent form IRB approved 09/12/2017. She was given ample time to review and had no questions. All procedures and study requirements,costs, risks, risks of getting pregnant, benefits, confidentiality, and alternatives to treatment were discussed,as well as the fact that she could withdraw from the study at any time without penalty . She was made aware her participation is entirely voluntary. She willingly signed the Main study consent and was provided with a copy to take with her. She declined participation in the optional genetic sub-studies. No study procedures were performed prior to consenting patient.     Since she had another appointment following her visit with Dr. Doe today, she agreed to come back to clinic at a later time to complete all of the necessary screening procedures. We set up her screening visit for 5/22/2018. All screening procedures will be completed per protocol at that visit. Patient was provided with my contact information, a copy of the consent form, and her appointment reminder. Patient verbalized understanding and had no questions at this time.

## 2018-05-15 NOTE — PROGRESS NOTES
Subjective:       Patient ID: Avril Monzon is a 73 y.o. Black or  female who presents for re-evaluation of progression of Chronic Kidney Disease (4); Follow-up; Anemia; hyperparathyroid of renal origin; Vitamin D Deficiency; and Hypertension    Anemia   There has been no bruising/bleeding easily, fever or palpitations.   Hypertension   Pertinent negatives include no chest pain, headaches, palpitations or shortness of breath.        Patient is a 74 YO AAF with diabetic nephropathy for many years. Now has CKD-IV and Hx of retinopathy.      Has seen dr. Hanson in the past     10/2017 Rayaldee ; low sugars d/w patient     2/2018 off epo since 11/2017;   rayaldee     5/2018 sign up for anemia study     Review of Systems   Constitutional: Negative for activity change, appetite change, chills, diaphoresis, fatigue, fever and unexpected weight change.   HENT: Negative for congestion, dental problem, drooling, postnasal drip, rhinorrhea and voice change.    Eyes: Negative for discharge.   Respiratory: Negative for apnea, cough, choking, chest tightness, shortness of breath, wheezing and stridor.    Cardiovascular: Negative for chest pain, palpitations and leg swelling.   Gastrointestinal: Negative for abdominal distention, blood in stool, constipation, diarrhea, nausea, rectal pain and vomiting.   Endocrine: Negative for cold intolerance, heat intolerance, polydipsia and polyuria.   Genitourinary: Negative for decreased urine volume, difficulty urinating, dysuria, enuresis, flank pain, frequency, hematuria and urgency.   Musculoskeletal: Negative for arthralgias, back pain, gait problem and joint swelling.   Skin: Negative for rash.   Allergic/Immunologic: Negative for food allergies and immunocompromised state.   Neurological: Negative for dizziness, tremors, syncope, numbness and headaches.   Hematological: Does not bruise/bleed easily.   Psychiatric/Behavioral: Negative for agitation, behavioral problems  "and self-injury. The patient is not nervous/anxious and is not hyperactive.    All other systems reviewed and are negative.      Objective:   BP (!) 142/50   Pulse 64   Ht 5' 5" (1.651 m)   Wt 72 kg (158 lb 11.7 oz)   BMI 26.41 kg/m²      Physical Exam   Constitutional: She is oriented to person, place, and time. No distress.   HENT:   Head: Normocephalic and atraumatic.   Nose: Nose normal.   Eyes: Conjunctivae and EOM are normal. Pupils are equal, round, and reactive to light.   Neck: Normal range of motion. No JVD present. No tracheal deviation present. No thyromegaly present.   Cardiovascular: Normal rate, regular rhythm, normal heart sounds and intact distal pulses.  Exam reveals no gallop and no friction rub.    No murmur heard.  Pulmonary/Chest: Effort normal and breath sounds normal. No respiratory distress. She has no wheezes. She has no rales. She exhibits no tenderness.   Abdominal: Soft. Bowel sounds are normal. She exhibits no distension and no mass. There is no tenderness. No hernia.   Musculoskeletal: Normal range of motion. She exhibits edema. She exhibits no tenderness or deformity.   Trace edema   Neurological: She is alert and oriented to person, place, and time. She has normal reflexes. She displays normal reflexes. No cranial nerve deficit. She exhibits normal muscle tone. Coordination normal.   Skin: Skin is warm. She is not diaphoretic. No erythema. There is pallor.   Psychiatric: She has a normal mood and affect. Her behavior is normal. Judgment and thought content normal.   Nursing note and vitals reviewed.        Lab Results   Component Value Date    CREATININE 2.9 (H) 05/15/2018    BUN 54 (H) 05/15/2018     05/15/2018    K 4.7 05/15/2018     05/15/2018    CO2 22 (L) 05/15/2018     Lab Results   Component Value Date    WBC 4.86 05/15/2018    HGB 10.3 (L) 05/15/2018    HCT 31.3 (L) 05/15/2018    MCV 93 05/15/2018     05/15/2018     Lab Results   Component Value Date    " .0 (H) 01/23/2018    CALCIUM 9.4 05/15/2018    PHOS 3.7 01/23/2018     Vitamin D level is 8checked on January 2018    Assessment:    )    1. Chronic kidney disease, stage IV (severe)    2. Essential hypertension    3. Secondary hyperparathyroidism    4. Anemia of chronic renal failure, stage 4 (severe)    5. Proteinuria due to type 2 diabetes mellitus    6. Diabetic nephropathy associated with type 2 diabetes mellitus    7. Metabolic acidemia    8. Vitamin D deficiency    9. Nephrotic syndrome due to diabetes mellitus        Plan:         1. Chronic kidney Disease stage IV/V: GFR 16 -20 % cystatin C; avoid NSAids; patient's kidney function is about 10-15 percent.  She will need education class ; patient lives with daughter --possibly PD     2. HPT with severe vitamin D deficiency: Started on vitamin D  Rayaldee 30 microgram daily.   Calcium and phosphorus are stable    3. Anemia: doing well with procrit : sign up for anemia study --ASCEND-ND in clinic today as patient is agreeable and is a good candidate     4. HTN : controlled at home and on re-check     5. Protienuria: in remission      6:Acidosis: better on sodium bicarbonate    7: LBP: MRI reviewed : re-consult dr. Fenton    8.  Hyperkalemia: much better     9. Tonic water for leg cramps      10. type 2 diabetes with hypoglycemia:d/w patient to avoid low sugars       follow up in 3 months       Kim Doe MD

## 2018-05-21 ENCOUNTER — TELEPHONE (OUTPATIENT)
Dept: RESEARCH | Facility: HOSPITAL | Age: 73
End: 2018-05-21

## 2018-05-22 ENCOUNTER — LAB VISIT (OUTPATIENT)
Dept: LAB | Facility: HOSPITAL | Age: 73
End: 2018-05-22
Attending: INTERNAL MEDICINE
Payer: MEDICARE

## 2018-05-22 ENCOUNTER — RESEARCH ENCOUNTER (OUTPATIENT)
Dept: RESEARCH | Facility: HOSPITAL | Age: 73
End: 2018-05-22
Payer: MEDICARE

## 2018-05-22 DIAGNOSIS — D64.9 ANEMIA, UNSPECIFIED TYPE: ICD-10-CM

## 2018-05-22 DIAGNOSIS — Z00.6 RESEARCH EXAM: ICD-10-CM

## 2018-05-22 DIAGNOSIS — N18.4 CHRONIC KIDNEY DISEASE (CKD) STAGE G4/A3, SEVERELY DECREASED GLOMERULAR FILTRATION RATE (GFR) BETWEEN 15-29 ML/MIN/1.73 SQUARE METER AND ALBUMINURIA CREATININE RATIO GREATER THAN 300 MG/G: ICD-10-CM

## 2018-05-22 LAB — DRUG STUDY SPECIMEN TYPE: NORMAL

## 2018-05-22 PROCEDURE — 36415 COLL VENOUS BLD VENIPUNCTURE: CPT | Mod: PO

## 2018-05-22 NOTE — PROGRESS NOTES
PROTOCOL: [facility] ASCEND-ND [id number]  SUBJECT NUMBER: [id number]  SUBJECT INITIALS: LW     Subject is here today to complete screening visit procedures for the [facility] ASCEND-ND 200808 study. She was consented ahead of time when she was in clinic to see Dr. Doe in Nephrology on 5/15/2018. Please see note from 5/15/2018 for consent process documentation. Labs were collected at 9:30am per the study protocol, processed and shipped via [employer] same day tracking numer [id number]. Patient was brought to a private exam room where her hemoglobin value was checked. Hemoglobin was 10.3g/dL and was checked at 9:43am. Since patient is on Procrit this puts her in the required range of 8-11g/dL. Following the hemoglobin check, patient was screened in [facility] IVRS and was given subject number [id number].     Vitals were collected following her hemoglobin check:     BP after 5 min rest: 173/62  HR: 64  Weight: 72.3 kgs  Height: 165.09 cm    After vitals were collected, patient completed the Symptoms of aCKD and PGI-S questionnaires.Medical history and current medications were reviewed. Patient was informed of her next visit and was asked if she had any questions at this time. Patient did not have any questions. She was given our contact info and verbalized understanding and had no questions at this time.

## 2018-06-04 ENCOUNTER — TELEPHONE (OUTPATIENT)
Dept: RESEARCH | Facility: HOSPITAL | Age: 73
End: 2018-06-04

## 2018-06-08 PROBLEM — E78.5 HYPERLIPIDEMIA ASSOCIATED WITH TYPE 2 DIABETES MELLITUS: Status: ACTIVE | Noted: 2018-06-08

## 2018-06-08 PROBLEM — E11.22 DIABETES MELLITUS WITH STAGE 4 CHRONIC KIDNEY DISEASE GFR 15-29: Status: ACTIVE | Noted: 2018-06-08

## 2018-06-08 PROBLEM — N18.4 DIABETES MELLITUS WITH STAGE 4 CHRONIC KIDNEY DISEASE GFR 15-29: Status: ACTIVE | Noted: 2018-06-08

## 2018-06-08 PROBLEM — E11.69 HYPERLIPIDEMIA ASSOCIATED WITH TYPE 2 DIABETES MELLITUS: Status: ACTIVE | Noted: 2018-06-08

## 2018-06-08 NOTE — PROGRESS NOTES
"Subjective:      Patient ID: Avril Monzon is a 73 y.o. female.    Chief Complaint: Follow-up and Foot Swelling (Left)      HPI  Here for f/u medical problems and preventive exam.  Getting EPO, follows regularly with Nephro.  No f/c/sw/cough.  No cp/sob/palp.  Recently has occas rapid heart beat at rest.  Sugars  AC breakfast.  BMs slow, takes dulcolax.  Urine normal.  Renal started Rayaldee.  Easily fatigued with exertion.      HM: 11/17 fluvax, 1/16 [id number], 5/14 [id number], 6/12 TDaP, 9/16 mmg, Cscope about 5y ago outside, 2/18 Eye Dr. Coello.     Review of Systems   Constitutional: Negative for appetite change, chills, diaphoresis and fever.   HENT: Negative for congestion, ear pain, rhinorrhea, sinus pressure and sore throat.    Respiratory: Negative for cough, chest tightness and shortness of breath.    Cardiovascular: Negative for chest pain and palpitations.   Gastrointestinal: Negative for blood in stool, constipation, diarrhea, nausea and vomiting.   Genitourinary: Negative for dysuria, frequency, hematuria, menstrual problem, urgency and vaginal discharge.   Musculoskeletal: Negative for arthralgias.   Skin: Negative for rash.   Neurological: Negative for dizziness and headaches.   Psychiatric/Behavioral: Negative for sleep disturbance. The patient is not nervous/anxious.          Objective:   /64 (BP Location: Right arm, Patient Position: Sitting)   Pulse 74   Temp 96 °F (35.6 °C) (Tympanic)   Ht 5' 5" (1.651 m)   Wt 71.5 kg (157 lb 10.1 oz)   SpO2 97%   BMI 26.23 kg/m²     Physical Exam   Constitutional: She is oriented to person, place, and time. She appears well-developed and well-nourished.   HENT:   Right Ear: External ear normal. Tympanic membrane is not injected.   Left Ear: External ear normal. Tympanic membrane is not injected.   Mouth/Throat: Oropharynx is clear and moist.   Eyes: Conjunctivae are normal.   Neck: Normal range of motion. Neck supple. No thyromegaly present. "   Cardiovascular: Normal rate, regular rhythm and intact distal pulses.  Exam reveals no gallop and no friction rub.    Murmur (systolic.) heard.  Pulses:       Dorsalis pedis pulses are 2+ on the right side, and 2+ on the left side.        Posterior tibial pulses are 2+ on the right side, and 2+ on the left side.   Pulmonary/Chest: Effort normal and breath sounds normal. She has no wheezes. She has no rales. Right breast exhibits no mass, no nipple discharge, no skin change and no tenderness. Left breast exhibits no mass, no nipple discharge, no skin change and no tenderness.   Abdominal: Soft. Bowel sounds are normal. She exhibits no mass. There is no tenderness.   Musculoskeletal: She exhibits no edema.   Feet:   Right Foot:   Protective Sensation: 10 sites tested. 10 sites sensed.   Skin Integrity: Negative for ulcer, blister, skin breakdown, erythema, warmth, callus or dry skin.   Left Foot:   Protective Sensation: 10 sites tested. 10 sites sensed.   Skin Integrity: Negative for ulcer, blister, skin breakdown, erythema, warmth, callus or dry skin.   Lymphadenopathy:     She has no cervical adenopathy.        Right axillary: No lateral adenopathy present.        Left axillary: No lateral adenopathy present.  Neurological: She is alert and oriented to person, place, and time.   Skin: Skin is warm. No rash noted.   Psychiatric: She has a normal mood and affect.       Results for SERVANDO BUSTILLO (MRN [id number]) as of 6/12/2018 16:28   Ref. Range 11/1/2017 12:12 1/23/2018 13:45 1/23/2018 13:58 2/20/2018 11:34 5/15/2018 09:51   WBC Latest Ref Range: 3.90 - 12.70 K/uL  6.78  6.67 4.86   RBC Latest Ref Range: 4.00 - 5.40 M/uL  3.20 (L)  3.36 (L) 3.38 (L)   Hemoglobin Latest Ref Range: 12.0 - 16.0 g/dL  9.5 (L)  10.1 (L) 10.3 (L)   Hematocrit Latest Ref Range: 37.0 - 48.5 %  30.0 (L)  30.7 (L) 31.3 (L)   MCV Latest Ref Range: 82 - 98 fL  94  91 93   MCH Latest Ref Range: 27.0 - 31.0 pg  29.7  30.1 30.5   [facility] Latest Ref  Range: 32.0 - 36.0 g/dL  31.7 (L)  32.9 32.9   RDW Latest Ref Range: 11.5 - 14.5 %  13.3  13.2 13.1   Platelets Latest Ref Range: 150 - 350 K/uL  208  185 159   MPV Latest Ref Range: 9.2 - 12.9 fL  10.8  9.2 9.1 (L)   Gran% Latest Ref Range: 38.0 - 73.0 %  67.1  75.8 (H) 66.7   Gran # (ANC) Latest Ref Range: 1.8 - 7.7 K/uL  4.5  5.1 3.2   Immature Granulocytes Latest Ref Range: 0.0 - 0.5 %  0.1      Immature Grans (Abs) Latest Ref Range: 0.00 - 0.04 K/uL  0.01      Lymph% Latest Ref Range: 18.0 - 48.0 %  23.7  17.7 (L) 29.6   Lymph # Latest Ref Range: 1.0 - 4.8 K/uL  1.6  1.2 1.4   Mono% Latest Ref Range: 4.0 - 15.0 %  7.5  5.2 1.0 (L)   Mono # Latest Ref Range: 0.3 - 1.0 K/uL  0.5  0.4 0.1 (L)   Eosinophil% Latest Ref Range: 0.0 - 8.0 %  1.3  1.2 2.5   Eos # Latest Ref Range: 0.0 - 0.5 K/uL  0.1  0.1 0.1   Basophil% Latest Ref Range: 0.0 - 1.9 %  0.3  0.1 0.2   Baso # Latest Ref Range: 0.00 - 0.20 K/uL  0.02  0.01 0.01   nRBC Latest Ref Range: 0 /100 WBC  0      Iron Latest Ref Range: 30 - 160 ug/dL    59 69   TIBC Latest Ref Range: 250 - 450 ug/dL    369 253   Saturated Iron Latest Ref Range: 20 - 50 %    16 (L) 27   Transferrin Latest Ref Range: 200 - 375 mg/dL    249 171 (L)   Ferritin Latest Ref Range: 20.0 - 300.0 ng/mL    24 555 (H)   Retic Latest Ref Range: 0.5 - 2.5 %    1.9 1.5   Sodium Latest Ref Range: 136 - 145 mmol/L  146 (H)  146 (H) 142   Potassium Latest Ref Range: 3.5 - 5.1 mmol/L  4.1  4.3 4.7   Chloride Latest Ref Range: 95 - 110 mmol/L  108  109 109   CO2 Latest Ref Range: 23 - 29 mmol/L  25  24 22 (L)   Anion Gap Latest Ref Range: 8 - 16 mmol/L  13  13 11   BUN, Bld Latest Ref Range: 8 - 23 mg/dL  61 (H)  49 (H) 54 (H)   Creatinine Latest Ref Range: 0.5 - 1.4 mg/dL  3.1 (H)  2.9 (H) 2.9 (H)   eGFR if non African American Latest Ref Range: >60 mL/min/1.73 m^2  14.6 (A)  16 (A) 16 (A)   eGFR if African American Latest Ref Range: >60 mL/min/1.73 m^2  16.8 (A)  18 (A) 18 (A)   Glucose Latest Ref  Range: 70 - 110 mg/dL  51 (L)  72 144 (H)   Calcium Latest Ref Range: 8.7 - 10.5 mg/dL  9.4  9.3 9.4   Phosphorus Latest Ref Range: 2.7 - 4.5 mg/dL  3.7      Alkaline Phosphatase Latest Ref Range: 55 - 135 U/L    119 98   Total Protein Latest Ref Range: 6.0 - 8.4 g/dL    7.2 6.6   Albumin Latest Ref Range: 3.5 - 5.2 g/dL  3.7  3.9 3.7   Total Bilirubin Latest Ref Range: 0.1 - 1.0 mg/dL    0.2 0.3   AST Latest Ref Range: 10 - 40 U/L    18 21   ALT Latest Ref Range: 10 - 44 U/L    16 24   LD Latest Ref Range: 110 - 260 U/L    281 (H) 233   Vit D, 25-Hydroxy Latest Ref Range: 30 - 96 ng/mL  8 (L)      Hemoglobin A1C Latest Ref Range: 4.0 - 5.6 % 6.5 (H)       Estimated Avg Glucose Latest Ref Range: 68 - 131 mg/dL 140 (H)           Assessment:       1. Encounter for preventive health examination    2. Type 2 diabetes mellitus with diabetic peripheral angiopathy without gangrene, with long-term current use of insulin    3. Hypertension associated with diabetes    4. Controlled type 2 diabetes mellitus with diabetic polyneuropathy, with long-term current use of insulin    5. Diabetes mellitus with stage 4 chronic kidney disease GFR 15-29    6. Hyperlipidemia associated with type 2 diabetes mellitus    7. History of CVA (cerebrovascular accident)    8. Encounter for screening mammogram for malignant neoplasm of breast           Plan:     Encounter for preventive health examination- lab now with HI.  -     Basic metabolic panel; Future; Expected date: 06/12/2018  -     Mammo Digital Screening Bilat with CAD; Future; Expected date: 06/12/2018  -     Microalbumin/creatinine urine ratio; Future; Expected date: 06/12/2018  -     Hemoglobin A1c; Future; Expected date: 06/12/2018  -     TSH; Future; Expected date: 06/12/2018  -     Lipid panel; Future; Expected date: 06/12/2018    Type 2 diabetes mellitus with diabetic peripheral angiopathy without gangrene, with diabetic polyneuropathy, with long-term current use of insulin-  check lab now.    Fe def and CKD related anemia, per HemeOnc.    Hypertension associated with diabetes- stable on rxs.    Diabetes mellitus with stage 4 chronic kidney disease GFR 15-29    Hyperlipidemia associated with type 2 diabetes mellitus- check lab.    History of CVA (cerebrovascular accident)    RTC  6mo.  Check ECHO with [facility].  Podiatry for toe pain, bottom of foot pain.

## 2018-06-12 ENCOUNTER — OFFICE VISIT (OUTPATIENT)
Dept: INTERNAL MEDICINE | Facility: CLINIC | Age: 73
End: 2018-06-12
Payer: MEDICARE

## 2018-06-12 ENCOUNTER — LAB VISIT (OUTPATIENT)
Dept: LAB | Facility: HOSPITAL | Age: 73
End: 2018-06-12
Attending: INTERNAL MEDICINE
Payer: MEDICARE

## 2018-06-12 VITALS
OXYGEN SATURATION: 97 % | WEIGHT: 157.63 LBS | BODY MASS INDEX: 26.26 KG/M2 | DIASTOLIC BLOOD PRESSURE: 64 MMHG | HEART RATE: 74 BPM | SYSTOLIC BLOOD PRESSURE: 138 MMHG | HEIGHT: 65 IN | TEMPERATURE: 96 F

## 2018-06-12 DIAGNOSIS — E78.5 HYPERLIPIDEMIA ASSOCIATED WITH TYPE 2 DIABETES MELLITUS: ICD-10-CM

## 2018-06-12 DIAGNOSIS — E11.42 CONTROLLED TYPE 2 DIABETES MELLITUS WITH DIABETIC POLYNEUROPATHY, WITH LONG-TERM CURRENT USE OF INSULIN: ICD-10-CM

## 2018-06-12 DIAGNOSIS — I15.2 HYPERTENSION ASSOCIATED WITH DIABETES: Chronic | ICD-10-CM

## 2018-06-12 DIAGNOSIS — E11.69 HYPERLIPIDEMIA ASSOCIATED WITH TYPE 2 DIABETES MELLITUS: ICD-10-CM

## 2018-06-12 DIAGNOSIS — E11.51 TYPE 2 DIABETES MELLITUS WITH DIABETIC PERIPHERAL ANGIOPATHY WITHOUT GANGRENE, WITH LONG-TERM CURRENT USE OF INSULIN: ICD-10-CM

## 2018-06-12 DIAGNOSIS — Z79.4 TYPE 2 DIABETES MELLITUS WITH DIABETIC PERIPHERAL ANGIOPATHY WITHOUT GANGRENE, WITH LONG-TERM CURRENT USE OF INSULIN: ICD-10-CM

## 2018-06-12 DIAGNOSIS — E11.22 DIABETES MELLITUS WITH STAGE 4 CHRONIC KIDNEY DISEASE GFR 15-29: ICD-10-CM

## 2018-06-12 DIAGNOSIS — N18.4 DIABETES MELLITUS WITH STAGE 4 CHRONIC KIDNEY DISEASE GFR 15-29: ICD-10-CM

## 2018-06-12 DIAGNOSIS — Z12.31 ENCOUNTER FOR SCREENING MAMMOGRAM FOR MALIGNANT NEOPLASM OF BREAST: ICD-10-CM

## 2018-06-12 DIAGNOSIS — R01.1 MURMUR, CARDIAC: ICD-10-CM

## 2018-06-12 DIAGNOSIS — Z00.00 ENCOUNTER FOR PREVENTIVE HEALTH EXAMINATION: Primary | ICD-10-CM

## 2018-06-12 DIAGNOSIS — Z00.00 ENCOUNTER FOR PREVENTIVE HEALTH EXAMINATION: ICD-10-CM

## 2018-06-12 DIAGNOSIS — E11.59 HYPERTENSION ASSOCIATED WITH DIABETES: Chronic | ICD-10-CM

## 2018-06-12 DIAGNOSIS — Z79.4 CONTROLLED TYPE 2 DIABETES MELLITUS WITH DIABETIC POLYNEUROPATHY, WITH LONG-TERM CURRENT USE OF INSULIN: ICD-10-CM

## 2018-06-12 DIAGNOSIS — Z86.73 HISTORY OF CVA (CEREBROVASCULAR ACCIDENT): ICD-10-CM

## 2018-06-12 PROCEDURE — 80061 LIPID PANEL: CPT

## 2018-06-12 PROCEDURE — 3075F SYST BP GE 130 - 139MM HG: CPT | Mod: CPTII,S$GLB,, | Performed by: INTERNAL MEDICINE

## 2018-06-12 PROCEDURE — 36415 COLL VENOUS BLD VENIPUNCTURE: CPT | Mod: PO

## 2018-06-12 PROCEDURE — 80048 BASIC METABOLIC PNL TOTAL CA: CPT

## 2018-06-12 PROCEDURE — 99397 PER PM REEVAL EST PAT 65+ YR: CPT | Mod: S$GLB,,, | Performed by: INTERNAL MEDICINE

## 2018-06-12 PROCEDURE — 99999 PR PBB SHADOW E&M-EST. PATIENT-LVL III: CPT | Mod: PBBFAC,,, | Performed by: INTERNAL MEDICINE

## 2018-06-12 PROCEDURE — 99499 UNLISTED E&M SERVICE: CPT | Mod: S$PBB,,, | Performed by: INTERNAL MEDICINE

## 2018-06-12 PROCEDURE — 84443 ASSAY THYROID STIM HORMONE: CPT

## 2018-06-12 PROCEDURE — 83036 HEMOGLOBIN GLYCOSYLATED A1C: CPT

## 2018-06-12 PROCEDURE — 3078F DIAST BP <80 MM HG: CPT | Mod: CPTII,S$GLB,, | Performed by: INTERNAL MEDICINE

## 2018-06-12 PROCEDURE — 3044F HG A1C LEVEL LT 7.0%: CPT | Mod: CPTII,S$GLB,, | Performed by: INTERNAL MEDICINE

## 2018-06-13 LAB
ANION GAP SERPL CALC-SCNC: 12 MMOL/L
BUN SERPL-MCNC: 42 MG/DL
CALCIUM SERPL-MCNC: 9.6 MG/DL
CHLORIDE SERPL-SCNC: 105 MMOL/L
CHOLEST SERPL-MCNC: 190 MG/DL
CHOLEST/HDLC SERPL: 3.4 {RATIO}
CO2 SERPL-SCNC: 27 MMOL/L
CREAT SERPL-MCNC: 2.9 MG/DL
EST. GFR  (AFRICAN AMERICAN): 18.1 ML/MIN/1.73 M^2
EST. GFR  (NON AFRICAN AMERICAN): 15.7 ML/MIN/1.73 M^2
ESTIMATED AVG GLUCOSE: 148 MG/DL
GLUCOSE SERPL-MCNC: 100 MG/DL
HBA1C MFR BLD HPLC: 6.8 %
HDLC SERPL-MCNC: 56 MG/DL
HDLC SERPL: 29.5 %
LDLC SERPL CALC-MCNC: 100.4 MG/DL
NONHDLC SERPL-MCNC: 134 MG/DL
POTASSIUM SERPL-SCNC: 4.5 MMOL/L
SODIUM SERPL-SCNC: 144 MMOL/L
TRIGL SERPL-MCNC: 168 MG/DL
TSH SERPL DL<=0.005 MIU/L-ACNC: 2.44 UIU/ML

## 2018-06-19 ENCOUNTER — CLINICAL SUPPORT (OUTPATIENT)
Dept: CARDIOLOGY | Facility: CLINIC | Age: 73
End: 2018-06-19
Attending: INTERNAL MEDICINE
Payer: MEDICARE

## 2018-06-19 DIAGNOSIS — R01.1 MURMUR, CARDIAC: ICD-10-CM

## 2018-06-19 LAB
DIASTOLIC DYSFUNCTION: NO
ESTIMATED PA SYSTOLIC PRESSURE: 35
MITRAL VALVE REGURGITATION: NORMAL
RETIRED EF AND QEF - SEE NOTES: 60 (ref 55–65)

## 2018-06-19 PROCEDURE — 93306 TTE W/DOPPLER COMPLETE: CPT | Mod: S$GLB,,, | Performed by: INTERNAL MEDICINE

## 2018-06-20 ENCOUNTER — TELEPHONE (OUTPATIENT)
Dept: INTERNAL MEDICINE | Facility: CLINIC | Age: 73
End: 2018-06-20

## 2018-06-20 NOTE — TELEPHONE ENCOUNTER
----- Message from Rose Germain MD sent at 6/20/2018 12:28 PM CDT -----  Please inform pt labs are stable, and heart test is normal.  SM

## 2018-06-29 RX ORDER — SODIUM BICARBONATE 325 MG/1
TABLET ORAL
Qty: 90 TABLET | Refills: 11 | Status: SHIPPED | OUTPATIENT
Start: 2018-06-29 | End: 2019-07-05 | Stop reason: SDUPTHER

## 2018-07-10 ENCOUNTER — LAB VISIT (OUTPATIENT)
Dept: LAB | Facility: HOSPITAL | Age: 73
End: 2018-07-10
Attending: INTERNAL MEDICINE
Payer: MEDICARE

## 2018-07-10 ENCOUNTER — OFFICE VISIT (OUTPATIENT)
Dept: INTERNAL MEDICINE | Facility: CLINIC | Age: 73
End: 2018-07-10
Payer: MEDICARE

## 2018-07-10 VITALS
BODY MASS INDEX: 25.97 KG/M2 | SYSTOLIC BLOOD PRESSURE: 124 MMHG | HEIGHT: 65 IN | OXYGEN SATURATION: 97 % | TEMPERATURE: 97 F | DIASTOLIC BLOOD PRESSURE: 60 MMHG | HEART RATE: 64 BPM | WEIGHT: 155.88 LBS

## 2018-07-10 DIAGNOSIS — Z00.00 ENCOUNTER FOR PREVENTIVE HEALTH EXAMINATION: Primary | ICD-10-CM

## 2018-07-10 DIAGNOSIS — R80.9 PROTEINURIA, UNSPECIFIED TYPE: ICD-10-CM

## 2018-07-10 DIAGNOSIS — E78.5 HYPERLIPIDEMIA ASSOCIATED WITH TYPE 2 DIABETES MELLITUS: ICD-10-CM

## 2018-07-10 DIAGNOSIS — I15.2 HYPERTENSION ASSOCIATED WITH DIABETES: Chronic | ICD-10-CM

## 2018-07-10 DIAGNOSIS — D50.0 IRON DEFICIENCY ANEMIA DUE TO CHRONIC BLOOD LOSS: ICD-10-CM

## 2018-07-10 DIAGNOSIS — N18.4 ANEMIA IN STAGE 4 CHRONIC KIDNEY DISEASE: ICD-10-CM

## 2018-07-10 DIAGNOSIS — E11.51 TYPE 2 DIABETES MELLITUS WITH DIABETIC PERIPHERAL ANGIOPATHY WITHOUT GANGRENE, WITH LONG-TERM CURRENT USE OF INSULIN: ICD-10-CM

## 2018-07-10 DIAGNOSIS — Z79.4 CONTROLLED TYPE 2 DIABETES MELLITUS WITH DIABETIC POLYNEUROPATHY, WITH LONG-TERM CURRENT USE OF INSULIN: ICD-10-CM

## 2018-07-10 DIAGNOSIS — E11.42 CONTROLLED TYPE 2 DIABETES MELLITUS WITH DIABETIC POLYNEUROPATHY, WITH LONG-TERM CURRENT USE OF INSULIN: ICD-10-CM

## 2018-07-10 DIAGNOSIS — E21.3 HYPERPARATHYROIDISM: ICD-10-CM

## 2018-07-10 DIAGNOSIS — E55.9 VITAMIN D DEFICIENCY: ICD-10-CM

## 2018-07-10 DIAGNOSIS — Z79.4 TYPE 2 DIABETES MELLITUS WITH STABLE PROLIFERATIVE RETINOPATHY OF BOTH EYES, WITH LONG-TERM CURRENT USE OF INSULIN: ICD-10-CM

## 2018-07-10 DIAGNOSIS — N18.4 CHRONIC KIDNEY DISEASE (CKD) STAGE G4/A3, SEVERELY DECREASED GLOMERULAR FILTRATION RATE (GFR) BETWEEN 15-29 ML/MIN/1.73 SQUARE METER AND ALBUMINURIA CREATININE RATIO GREATER THAN 300 MG/G: ICD-10-CM

## 2018-07-10 DIAGNOSIS — Z13.820 SCREENING FOR OSTEOPOROSIS: ICD-10-CM

## 2018-07-10 DIAGNOSIS — E11.22 DIABETES MELLITUS WITH STAGE 4 CHRONIC KIDNEY DISEASE GFR 15-29: ICD-10-CM

## 2018-07-10 DIAGNOSIS — D63.1 ANEMIA IN STAGE 4 CHRONIC KIDNEY DISEASE: ICD-10-CM

## 2018-07-10 DIAGNOSIS — E11.3553 TYPE 2 DIABETES MELLITUS WITH STABLE PROLIFERATIVE RETINOPATHY OF BOTH EYES, WITH LONG-TERM CURRENT USE OF INSULIN: ICD-10-CM

## 2018-07-10 DIAGNOSIS — K59.00 CONSTIPATION, UNSPECIFIED CONSTIPATION TYPE: ICD-10-CM

## 2018-07-10 DIAGNOSIS — I70.0 AORTIC ATHEROSCLEROSIS: ICD-10-CM

## 2018-07-10 DIAGNOSIS — H25.11 CATARACTA BRUNESCENS OF RIGHT EYE: ICD-10-CM

## 2018-07-10 DIAGNOSIS — N18.9 ANEMIA ASSOCIATED WITH CHRONIC RENAL FAILURE: ICD-10-CM

## 2018-07-10 DIAGNOSIS — Z86.73 HISTORY OF CVA (CEREBROVASCULAR ACCIDENT): ICD-10-CM

## 2018-07-10 DIAGNOSIS — D63.1 ANEMIA ASSOCIATED WITH CHRONIC RENAL FAILURE: ICD-10-CM

## 2018-07-10 DIAGNOSIS — E11.69 HYPERLIPIDEMIA ASSOCIATED WITH TYPE 2 DIABETES MELLITUS: ICD-10-CM

## 2018-07-10 DIAGNOSIS — Z79.4 TYPE 2 DIABETES MELLITUS WITH DIABETIC PERIPHERAL ANGIOPATHY WITHOUT GANGRENE, WITH LONG-TERM CURRENT USE OF INSULIN: ICD-10-CM

## 2018-07-10 DIAGNOSIS — M51.36 DDD (DEGENERATIVE DISC DISEASE), LUMBAR: ICD-10-CM

## 2018-07-10 DIAGNOSIS — I77.9 BILATERAL CAROTID ARTERY DISEASE: ICD-10-CM

## 2018-07-10 DIAGNOSIS — N18.4 DIABETES MELLITUS WITH STAGE 4 CHRONIC KIDNEY DISEASE GFR 15-29: ICD-10-CM

## 2018-07-10 DIAGNOSIS — E11.59 HYPERTENSION ASSOCIATED WITH DIABETES: Chronic | ICD-10-CM

## 2018-07-10 LAB
ALBUMIN SERPL BCP-MCNC: 3.8 G/DL
ALP SERPL-CCNC: 110 U/L
ALT SERPL W/O P-5'-P-CCNC: 20 U/L
ANION GAP SERPL CALC-SCNC: 7 MMOL/L
AST SERPL-CCNC: 17 U/L
BASOPHILS # BLD AUTO: 0.01 K/UL
BASOPHILS NFR BLD: 0.2 %
BILIRUB SERPL-MCNC: 0.5 MG/DL
BUN SERPL-MCNC: 49 MG/DL
CALCIUM SERPL-MCNC: 9.4 MG/DL
CHLORIDE SERPL-SCNC: 106 MMOL/L
CO2 SERPL-SCNC: 29 MMOL/L
CREAT SERPL-MCNC: 2.8 MG/DL
DIFFERENTIAL METHOD: ABNORMAL
EOSINOPHIL # BLD AUTO: 0.1 K/UL
EOSINOPHIL NFR BLD: 2.2 %
ERYTHROCYTE [DISTWIDTH] IN BLOOD BY AUTOMATED COUNT: 12.8 %
EST. GFR  (AFRICAN AMERICAN): 19 ML/MIN/1.73 M^2
EST. GFR  (NON AFRICAN AMERICAN): 16 ML/MIN/1.73 M^2
FERRITIN SERPL-MCNC: 565 NG/ML
GLUCOSE SERPL-MCNC: 182 MG/DL
HCT VFR BLD AUTO: 31.8 %
HGB BLD-MCNC: 10.5 G/DL
IRON SERPL-MCNC: 77 UG/DL
LDH SERPL L TO P-CCNC: 238 U/L
LYMPHOCYTES # BLD AUTO: 1 K/UL
LYMPHOCYTES NFR BLD: 18.1 %
MCH RBC QN AUTO: 30 PG
MCHC RBC AUTO-ENTMCNC: 33 G/DL
MCV RBC AUTO: 91 FL
MONOCYTES # BLD AUTO: 0.2 K/UL
MONOCYTES NFR BLD: 3.9 %
NEUTROPHILS # BLD AUTO: 4.2 K/UL
NEUTROPHILS NFR BLD: 75.6 %
PLATELET # BLD AUTO: 204 K/UL
PMV BLD AUTO: 9.9 FL
POTASSIUM SERPL-SCNC: 4.1 MMOL/L
PROT SERPL-MCNC: 6.7 G/DL
RBC # BLD AUTO: 3.5 M/UL
RETICS/RBC NFR AUTO: 2.3 %
SATURATED IRON: 29 %
SODIUM SERPL-SCNC: 142 MMOL/L
TOTAL IRON BINDING CAPACITY: 263 UG/DL
TRANSFERRIN SERPL-MCNC: 178 MG/DL
WBC # BLD AUTO: 5.57 K/UL

## 2018-07-10 PROCEDURE — 85045 AUTOMATED RETICULOCYTE COUNT: CPT

## 2018-07-10 PROCEDURE — 82728 ASSAY OF FERRITIN: CPT

## 2018-07-10 PROCEDURE — 83615 LACTATE (LD) (LDH) ENZYME: CPT | Mod: PO

## 2018-07-10 PROCEDURE — 99999 PR PBB SHADOW E&M-EST. PATIENT-LVL IV: CPT | Mod: PBBFAC,,, | Performed by: PHYSICIAN ASSISTANT

## 2018-07-10 PROCEDURE — 80053 COMPREHEN METABOLIC PANEL: CPT | Mod: PO

## 2018-07-10 PROCEDURE — 36415 COLL VENOUS BLD VENIPUNCTURE: CPT | Mod: PO

## 2018-07-10 PROCEDURE — 3074F SYST BP LT 130 MM HG: CPT | Mod: CPTII,S$GLB,, | Performed by: PHYSICIAN ASSISTANT

## 2018-07-10 PROCEDURE — 83540 ASSAY OF IRON: CPT

## 2018-07-10 PROCEDURE — 99499 UNLISTED E&M SERVICE: CPT | Mod: S$GLB,,, | Performed by: PHYSICIAN ASSISTANT

## 2018-07-10 PROCEDURE — 3078F DIAST BP <80 MM HG: CPT | Mod: CPTII,S$GLB,, | Performed by: PHYSICIAN ASSISTANT

## 2018-07-10 PROCEDURE — G0439 PPPS, SUBSEQ VISIT: HCPCS | Mod: S$GLB,,, | Performed by: PHYSICIAN ASSISTANT

## 2018-07-10 PROCEDURE — 85025 COMPLETE CBC W/AUTO DIFF WBC: CPT | Mod: PO

## 2018-07-10 PROCEDURE — 3044F HG A1C LEVEL LT 7.0%: CPT | Mod: CPTII,S$GLB,, | Performed by: PHYSICIAN ASSISTANT

## 2018-07-10 NOTE — PROGRESS NOTES
"Avril Monzon presented for a  Medicare AWV and comprehensive Health Risk Assessment today. The following components were reviewed and updated:    · Medical history  · Family History  · Social history  · Allergies and Current Medications  · Health Risk Assessment  · Health Maintenance  · Care Team     ** See Completed Assessments for Annual Wellness Visit within the encounter summary.**       The following assessments were completed:  · Living Situation  · CAGE  · Depression Screening  · Timed Get Up and Go  · Whisper Test  · Cognitive Function Screening  · Nutrition Screening  · ADL Screening  · PAQ Screening    Vitals:    07/10/18 1058   BP: 124/60   Pulse: 64   Temp: 96.5 °F (35.8 °C)   TempSrc: Tympanic   SpO2: 97%   Weight: 70.7 kg (155 lb 13.8 oz)   Height: 5' 5" (1.651 m)     Body mass index is 25.94 kg/m².  Physical Exam   Constitutional: She is oriented to person, place, and time. She appears well-developed and well-nourished. No distress.   HENT:   Head: Normocephalic and atraumatic.   Cardiovascular: Normal rate, regular rhythm, normal heart sounds and intact distal pulses.  Exam reveals no gallop and no friction rub.    No murmur heard.  Pulmonary/Chest: Effort normal and breath sounds normal. No respiratory distress. She has no wheezes. She has no rales.   Musculoskeletal: Normal range of motion.   Neurological: She is alert and oriented to person, place, and time.   Skin: Skin is warm. Capillary refill takes less than 2 seconds. No rash noted. She is not diaphoretic.   Psychiatric: She has a normal mood and affect. Her behavior is normal. Judgment and thought content normal.   Nursing note and vitals reviewed.        Diagnoses and health risks identified today and associated recommendations/orders:    1. Encounter for preventive health examination  -completed today.   -due for DEXA, colonoscopy, and zoster vaccine.   -advised to get zoster done at pharmacy  -pt states that she will discuss getting the " colonoscopy ordered with her hematologist at her next appointment.   -Dexa scan due. Will send message to PCP.    2. Screening for osteoporosis  -due. Will send note to PCP to order.     3. DDD (degenerative disc disease), lumbar  -MRI1/28/2016  -Stable. Takes tylenol PRN. Continue current treatment plan as previously prescribed with your PCP.     4. History of CVA (cerebrovascular accident)  -2012 and 2014 denies any residual weakness from the stroke. Not on asa daily. Advised to discuss with specialist.    5. Cataracta brunescens of right eye  -Stable and controlled. Continue current treatment plan as previously prescribed with your ophthalmology    6. 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 Continue current treatment plan as previously prescribed with your PCP.   Lab Results   Component Value Date    HGBA1C 6.8 (H) 06/12/2018     7. Aortic atherosclerosis  -CXR 5/4/2017.   -Stable and controlled. Continue current treatment plan as previously prescribed with your PCP.     8. Bilateral carotid artery disease  -Carotid ultrasound 5/26/2014.   -Stable and controlled. Continue current treatment plan as previously prescribed with your PCP.     9. Hyperlipidemia associated with type 2 diabetes mellitus  -Stable and controlled on lipitor 80mg. Continue current treatment plan as previously prescribed with your PCP.   Lab Results   Component Value Date    CHOL 190 06/12/2018    CHOL 176 06/23/2017    CHOL 216 (H) 05/18/2016     Lab Results   Component Value Date    HDL 56 06/12/2018    HDL 40 06/23/2017    HDL 55 05/18/2016     Lab Results   Component Value Date    LDLCALC 100.4 06/12/2018    LDLCALC 110.6 06/23/2017    LDLCALC 128.6 05/18/2016     Lab Results   Component Value Date    TRIG 168 (H) 06/12/2018    TRIG 127 06/23/2017    TRIG 162 (H) 05/18/2016     Lab Results   Component Value Date    CHOLHDL 29.5  06/12/2018    CHOLHDL 22.7 06/23/2017    CHOLHDL 25.5 05/18/2016     10. Hypertension associated with diabetes  -Stable and controlled on lisinopril, amlodipine, hydralazine, and lasix. Continue current treatment plan as previously prescribed with your PCP.     11. Chronic kidney disease (CKD) stage G4/A3, severely decreased glomerular filtration rate (GFR) between 15-29 mL/min/1.73 square meter and albuminuria creatinine ratio greater than 300 mg/g  -Stable. Close follow up with nephrology. Continue current treatment plan as previously prescribed with your nephrologist.    12. Proteinuria, unspecified type  -Stable. Close follow up with nephrology. Continue current treatment plan as previously prescribed with your nephrologist.    13. 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 Close follow up with nephrology. Continue current treatment plan as previously prescribed with your nephrologist.    14. Iron deficiency anemia due to chronic blood loss  -Stable and controlled. Close follow up with hem/onc. Monitored with CBCs. Procrit when hemoglobin falls below 9.  Continue current treatment plan as previously prescribed with your hematologist.  Lab Results   Component Value Date    IRON 69 05/15/2018    TIBC 253 05/15/2018    FERRITIN 555 (H) 05/15/2018     Lab Results   Component Value Date    WBC 5.57 07/10/2018    HGB 10.5 (L) 07/10/2018    HCT 31.8 (L) 07/10/2018    MCV 91 07/10/2018     07/10/2018       15. Controlled type 2 diabetes mellitus with diabetic polyneuropathy, with long-term current use of insulin  -Stable and controlled. Continue current treatment plan as previously prescribed with your PCP.     16. Diabetes mellitus with stage 4 chronic kidney disease GFR 15-29  -Stable and controlled on insulin. Continue current treatment plan as previously prescribed with your PCP and nephrologist.     17. Hyperparathyroidism  -Stable. On calcifediol. Close follow up with nephrology. Continue current  treatment plan as previously prescribed with your nephrologist.    18. Type 2 diabetes mellitus with diabetic peripheral angiopathy without gangrene, with long-term current use of insulin  -Stable and controlled. Continue current treatment plan as previously prescribed with your PCP.     19. Vitamin D deficiency  -currently on calcifediol. Continue current treatment plan as previously prescribed with your PCP and nephrologist.     20. Constipation, unspecified constipation type  -Stable and controlled on ducolax. Continue current treatment plan as previously prescribed with your PCP.     Provided Avril with a 5-10 year written screening schedule and personal prevention plan. Recommendations were developed using the USPSTF age appropriate recommendations. Education, counseling, and referrals were provided as needed. After Visit Summary printed and given to patient which includes a list of additional screenings\tests needed.    Follow-up if symptoms worsen or fail to improve.    RICO Hightower offered to discuss end of life issues, including information on how to make advance directives that the patient could use to name someone who would make medical decisions on their behalf if they became too ill to make themselves.    ___Patient declined  _X_Patient is interested, I provided paper work and offered to discuss.

## 2018-07-10 NOTE — PATIENT INSTRUCTIONS
Counseling and Referral of Other Preventative  (Italic type indicates deductible and co-insurance are waived)    Patient Name: Avril Monzon  Today's Date: 7/10/2018    Health Maintenance       Date Due Completion Date    DEXA SCAN 02/04/1987 ---    Colonoscopy 02/04/1997 ---    Zoster Vaccine 02/04/2007 ---    Influenza Vaccine 08/01/2018 11/1/2017    Mammogram 09/15/2018 9/15/2016    Hemoglobin A1c 12/12/2018 6/12/2018    Eye Exam 02/20/2019 2/20/2018    Override on 4/22/2016: Done    Override on 9/1/2013: Done    Foot Exam 06/12/2019 6/12/2018    Override on 1/27/2016: Done    Override on 8/21/2014: Done    Override on 3/26/2014: Done    Override on 1/21/2014: Done    Lipid Panel 06/12/2019 6/12/2018    TETANUS VACCINE 06/25/2022 6/25/2012        Colonoscopy needs to be approved by hem/onc due to her anemia. Advised pt to discuss with them to schedule.   Will get pt scheduled for dexa scan.     No orders of the defined types were placed in this encounter.    The following information is provided to all patients.  This information is to help you find resources for any of the problems found today that may be affecting your health:                Living healthy guide: www.[facility].[place].gov      Understanding Diabetes: www.diabetes.org      Eating healthy: www.cdc.gov/healthyweight      CDC home safety checklist: www.cdc.gov/steadi/patient.html      Agency on Aging: www.goea.[place].gov      Alcoholics anonymous (AA): www.aa.org      Physical Activity: www.pawan.nih.gov/ca4xmgv      Tobacco use: www.quitwithusla.org

## 2018-07-10 NOTE — Clinical Note
HRA completed today. DEXA scan due.  Colonoscopy due but pt states that she needs approval from hem/onc for this.

## 2018-07-16 ENCOUNTER — DOCUMENTATION ONLY (OUTPATIENT)
Dept: RESEARCH | Facility: HOSPITAL | Age: 73
End: 2018-07-16

## 2018-07-16 NOTE — PROGRESS NOTES
PROTOCOL: [facility] [id number]  SUBJECT NUMBER: [id number]  SUBJECT INITIALS: LW    Called patient to speak with her about her status in the [facility] [id number] clinical trial. Patient screen failed on 6/4/2018 because her hemoglobin was 10.3 g/dL and it needed to be between 8-10 g/dL in order to qualify for the trial. Patient was originally scheduled to come in to the clinic tomorrow to re-screen but after checking her recent lab work from [facility], her hemoglobin is still too high for her to qualify for the trial. I informed the patient that we will keep an eye on her hemoglobin level and if it falls within the required range we will re-screen her. Patient verbalized understanding and had no questions at this time.

## 2018-07-17 ENCOUNTER — OFFICE VISIT (OUTPATIENT)
Dept: PODIATRY | Facility: CLINIC | Age: 73
End: 2018-07-17
Payer: MEDICARE

## 2018-07-17 VITALS
HEIGHT: 65 IN | SYSTOLIC BLOOD PRESSURE: 151 MMHG | HEART RATE: 76 BPM | DIASTOLIC BLOOD PRESSURE: 68 MMHG | WEIGHT: 155.63 LBS | BODY MASS INDEX: 25.93 KG/M2

## 2018-07-17 DIAGNOSIS — E11.9 COMPREHENSIVE DIABETIC FOOT EXAMINATION, TYPE 2 DM, ENCOUNTER FOR: ICD-10-CM

## 2018-07-17 DIAGNOSIS — E11.42 CONTROLLED TYPE 2 DIABETES MELLITUS WITH DIABETIC POLYNEUROPATHY, WITH LONG-TERM CURRENT USE OF INSULIN: ICD-10-CM

## 2018-07-17 DIAGNOSIS — I77.1 ARTERIAL INSUFFICIENCY: ICD-10-CM

## 2018-07-17 DIAGNOSIS — Z79.4 CONTROLLED TYPE 2 DIABETES MELLITUS WITH DIABETIC POLYNEUROPATHY, WITH LONG-TERM CURRENT USE OF INSULIN: ICD-10-CM

## 2018-07-17 DIAGNOSIS — M77.52: Primary | ICD-10-CM

## 2018-07-17 PROCEDURE — 99999 PR PBB SHADOW E&M-EST. PATIENT-LVL III: CPT | Mod: PBBFAC,,, | Performed by: PODIATRIST

## 2018-07-17 PROCEDURE — 99214 OFFICE O/P EST MOD 30 MIN: CPT | Mod: S$GLB,,, | Performed by: PODIATRIST

## 2018-07-17 PROCEDURE — 3044F HG A1C LEVEL LT 7.0%: CPT | Mod: CPTII,S$GLB,, | Performed by: PODIATRIST

## 2018-07-17 PROCEDURE — 3077F SYST BP >= 140 MM HG: CPT | Mod: CPTII,S$GLB,, | Performed by: PODIATRIST

## 2018-07-17 PROCEDURE — 3078F DIAST BP <80 MM HG: CPT | Mod: CPTII,S$GLB,, | Performed by: PODIATRIST

## 2018-07-17 NOTE — PROGRESS NOTES
[facility] -   PODIATRIC MEDICINE AND SURGERY  PROGRESS NOTE  7/17/2018    PODIATRY NOTE  PCP: Dr. Rose Parks MD    CHIEF COMPLAINT   Chief Complaint   Patient presents with    Foot Pain     PCP Dr. Parks 06/12/18, Pt states that she has pain, left foot 2nd int. There seems to be some swelling in that area as well    Nail Problem     She also states that her nails have thickened but says that they are not painful.       HPI:    Avril Monzon is a 73 y.o. female who has a past medical history of Anemia; Arthritis; Back pain; Cataract; CKD stage G4/A3, GFR 15 - 29 and albumin creatinine ratio >300 mg/g; Diabetic retinopathy; DM (diabetes mellitus) type II controlled, neurological manifestation; GERD (gastroesophageal reflux disease); Glaucoma; Hyperlipidemia; Hypertension; Peripheral neuropathy; Polyneuropathy; Proteinuria; Seizures; Stroke (2012,2014); Tobacco dependence; and Type 2 diabetes with peripheral circulatory disorder, controlled.   Avril presents to clinic today complaining of left second toe intermittent pain.  The symptoms have been present for several years.  She denies any current pain but states she feels a bit uncomfortable at time under the ball of her foot.  He points to the 2nd metacarpal head.  Symptoms are currently to have 10 on a pain scale.  She also inquires about her toenail.  She is not performing treatment however she is concerned about possible fungus.     Patient denies other pedal complaints at this time.     PMH  Past Medical History:   Diagnosis Date    Anemia     Arthritis     back, hand, knees    Back pain     Cataract     CKD stage G4/A3, GFR 15 - 29 and albumin creatinine ratio >300 mg/g     GFR 40% Jan 2014 and 33% ub 3/2014 ([facility])    Diabetic retinopathy     DM (diabetes mellitus) type II controlled, neurological manifestation     GERD (gastroesophageal reflux disease)     Glaucoma     Hyperlipidemia     Hypertension     Peripheral  neuropathy     Polyneuropathy     Proteinuria     >6 mo     Seizures     [facility] 1/2014 -dick CT NRI showed small vessel    Stroke 2012,2014    Tobacco dependence     Type 2 diabetes with peripheral circulatory disorder, controlled        PROBLEM LIST  Patient Active Problem List    Diagnosis Date Noted    Diabetes mellitus with stage 4 chronic kidney disease GFR 15-29 06/08/2018    Hyperlipidemia associated with type 2 diabetes mellitus 06/08/2018    Iron deficiency anemia due to chronic blood loss 02/22/2018    Anemia in stage 4 chronic kidney disease 05/18/2017    History of CVA (cerebrovascular accident) 05/16/2017    Hyperparathyroidism 05/16/2017    Vitamin D deficiency 05/16/2017    DDD (degenerative disc disease), lumbar 05/16/2017    Bilateral carotid artery disease 11/11/2016    Aortic atherosclerosis 11/11/2016    Type 2 diabetes mellitus with circulatory disorder, with long-term current use of insulin 11/11/2016    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, with long-term current use of insulin 05/02/2016    Cataracta brunescens of right eye 05/02/2016    Chronic kidney disease (CKD) stage G4/A3, severely decreased glomerular filtration rate (GFR) between 15-29 mL/min/1.73 square meter and albuminuria creatinine ratio greater than 300 mg/g     Proteinuria     Constipation 02/18/2014    Hypertension associated with diabetes     Controlled type 2 diabetes mellitus with diabetic polyneuropathy, with long-term current use of insulin        MEDS  Current Outpatient Prescriptions on File Prior to Visit   Medication Sig Dispense Refill    amLODIPine (NORVASC) 10 MG tablet TAKE ONE TABLET BY MOUTH ONCE DAILY 30 tablet 11    atorvastatin (LIPITOR) 80 MG tablet Take 1 tablet (80 mg total) by mouth once daily. 30 tablet 11    blood sugar diagnostic Strp 1 strip by Misc.(Non-Drug; Combo Route) route 3 (three) times daily. relion ultima 100 strip 11     calcifediol (RAYALDEE) 30 mcg Cs24 Take 30 mcg by mouth once daily. 90 capsule 3    furosemide (LASIX) 20 MG tablet TAKE ONE TABLET BY MOUTH TWICE DAILY 60 tablet 9    hydrALAZINE (APRESOLINE) 10 MG tablet TAKE TWO TABLETS BY MOUTH EVERY 8 HOURS 180 tablet 11    insulin NPH-insulin regular, 70/30, (NOVOLIN 70/30) 100 unit/mL (70-30) injection INJECT SUBCUTANEOUSLY 30 UNITS IN THE MORNING AND INJECT 25 UNITS IN THE EVENING (Patient taking differently: INJECT SUBCUTANEOUSLY 30 UNITS IN THE MORNING AND INJECT 25 UNITS IN THE EVENING) 2 vial 3    lancets (ACCU-CHEK SOFTCLIX LANCETS) Misc 1 lancet by Misc.(Non-Drug; Combo Route) route 3 (three) times daily. 100 each 11    lisinopril (PRINIVIL,ZESTRIL) 20 MG tablet Take 40 mg by mouth once daily.      sodium bicarbonate 325 MG tablet TAKE ONE TABLET BY MOUTH THREE TIMES DAILY 90 tablet 11     No current facility-administered medications on file prior to visit.        Medication List with Changes/Refills   Current Medications    AMLODIPINE (NORVASC) 10 MG TABLET    TAKE ONE TABLET BY MOUTH ONCE DAILY    ATORVASTATIN (LIPITOR) 80 MG TABLET    Take 1 tablet (80 mg total) by mouth once daily.    BLOOD SUGAR DIAGNOSTIC STRP    1 strip by Misc.(Non-Drug; Combo Route) route 3 (three) times daily. relion ultima    CALCIFEDIOL (RAYALDEE) 30 MCG CS24    Take 30 mcg by mouth once daily.    FUROSEMIDE (LASIX) 20 MG TABLET    TAKE ONE TABLET BY MOUTH TWICE DAILY    HYDRALAZINE (APRESOLINE) 10 MG TABLET    TAKE TWO TABLETS BY MOUTH EVERY 8 HOURS    INSULIN NPH-INSULIN REGULAR, 70/30, (NOVOLIN 70/30) 100 UNIT/ML (70-30) INJECTION    INJECT SUBCUTANEOUSLY 30 UNITS IN THE MORNING AND INJECT 25 UNITS IN THE EVENING    LANCETS (ACCU-CHEK SOFTCLIX LANCETS) MISC    1 lancet by Misc.(Non-Drug; Combo Route) route 3 (three) times daily.    LISINOPRIL (PRINIVIL,ZESTRIL) 20 MG TABLET    Take 40 mg by mouth once daily.    SODIUM BICARBONATE 325 MG TABLET    TAKE ONE TABLET BY MOUTH THREE  "TIMES DAILY       PSH     Past Surgical History:   Procedure Laterality Date    BREAST MASS EXCISION      ,benign, age 15.    HYSTERECTOMY  1982    wrist cyst Right 1980s    dorsal wrist cyst        ALL  Review of patient's allergies indicates:   Allergen Reactions    Codeine Swelling    Darvon [propoxyphene] Swelling       SOC     Social History   Substance Use Topics    Smoking status: Former Smoker     Packs/day: 1.50     Years: 30.00     Types: Cigarettes     Quit date: 1/9/1990    Smokeless tobacco: Former User    Alcohol use No         FAMILY HX    Family History   Problem Relation Age of Onset    Diabetes Mother     Hyperlipidemia Mother     Hypertension Mother     Heart disease Mother         MI    Muscular dystrophy Son     Cancer Maternal Grandmother         colon            REVIEW OF SYSTEMS   General: This patient is well-developed, well-nourished and appears stated age, well-oriented to person, place and time, and cooperative and pleasant on today's visit  Constitutional: Negative for chills and fever.   Respiratory: Negative for shortness of breath.    Cardiovascular: Negative for chest pain, palpitations, orthopnea  Gastrointestinal: Negative for diarrhea, nausea and vomiting.   Musculoskeletal: Positive for above noted in HPI  Skin: Negative for rash, skin, or nail changes   Neurological: positive for tingling and sensory changes  Peripheral Vascular: no claudication or cyanosis  Psychiatric/Behavioral: Negative for altered mental status     PHYSICAL EXAM:      Vitals:    07/17/18 1154   BP: (!) 151/68   Pulse: 76   Weight: 70.6 kg (155 lb 10.3 oz)   Height: 5' 5" (1.651 m)       General: This patient is well-developed, well-nourished and appears stated age, well-oriented to person, place and time, and cooperative and pleasant on today's visit    LOWER EXTREMITY  VASCULAR  Dorsalis pedis and posterior tibial pulses palpable 1/4 bilaterally.   Capillary refill time immediate to the toes. "   Feet are warm to the touch. Skin temperature warm to warm from proximally to distally   There are varicosities, telangiectasias noted to bilateral foot and ankle regions.   There are no ecchymoses noted to bilateral foot and ankle regions.   There is gross lower extremity edema.    DERMATOLOGIC  Skin moist with healthy texture and turgor.  There are no open ulcerations, lacerations, or fissures to bilateral foot and ankle regions. There are no signs of infection as there is no erythema, no proximal-extending lymphangiitis, no fluctuance, or crepitus noted on palpation to bilateral foot and ankle regions.   There is no interdigital maceration.   There are no hyperkeratotic lesions noted to feet. Nails are well-trimmed.    NEUROLOGIC  Epicritic sensation is diminished.   Achilles and patellar deep tendon reflexes intact  Babinski reflex absent    ORTHOPEDIC/BIOMECHANICAL  Second toe with slight dorsal dislocation on LEFT   There is mild TTP sub 2nd metatarsal head   There are semi rigid digital contractures   Muscle strength AT/EHL/EDL/PT: 5/5; Achilles/Gastroc/Soleus: 5/5; PB/PL: 5/5 Muscle tone is normal.  Ankle joint ROM INTACT DF/PF, non-crepitus  STJ ROM  inv/ev, non crepitus         ASSESSMENT     Encounter Diagnoses   Name Primary?    Adhesive capsulitis of toe, left Yes    Comprehensive diabetic foot examination, type 2 DM, encounter for     Arterial insufficiency     Controlled type 2 diabetes mellitus with diabetic polyneuropathy, with long-term current use of insulin          PLAN  Patient was educated about clinical and imaging findings, and verbalizes understanding of above.     Diagnoses and all orders for this visit:  Adhesive capsulitis of toe, left    Comprehensive diabetic foot examination, type 2 DM, encounter for    Arterial insufficiency    Controlled type 2 diabetes mellitus with diabetic polyneuropathy, with long-term current use of insulin      Patient was educated and counseled regarding  joint capsulitis. I explained that the joint capsule has become inflamed as a result of abnormal and increased pressure and loading along the joint. Also the importance of removing the pressure load along that joint was emphasized to the patient to prevent further injury and degeneration of the joint. Both conservative and surgical measures were discussed with the patient. The patient was given recommendations for orthotic inserts which may be further modified and reinforced to appropriately offload the inflamed joints on follow up. We discussed steroid injection which patient declines as pain is intermittent. Will try recommended orthoses first    Shoe inspection. Diabetic Foot Education. Patient reminded of the importance of good nutrition and blood sugar control to help prevent podiatric complications of diabetes. Patient instructed on proper foot hygeine. We discussed wearing proper shoe gear, daily foot inspections, never walking without protective shoe gear, never putting sharp instruments to feet, routine podiatric exams.    Future Appointments  Date Time Provider Department Center   7/19/2018 9:00 AM Dudley Fletcher Jr., MD [facility] HEM ONC [place]   7/31/2018 9:55 AM LABORATORY, [facility] LAB [place]   7/31/2018 10:00 AM SPECIMEN, [facility] SPECLAB [place]   8/7/2018 10:30 AM Kim Doe MD [facility] NEPHRO [place]   8/7/2018 11:00 AM Sung Fenton MD [facility] INT JOSE [place]   12/12/2018 12:30 PM [facility] MAMMO1-SCR [facility] MAMMO [place]   12/12/2018 5:40 PM Rose Germain MD [facility] IM [facility]       Report Electronically Signed By:    Anette Montanez DPM   Podiatric Medicine & Surgery  [facility]  7/17/2018     Disclaimer:  This note may have been prepared using voice recognition software, it may have not been extensively proofed, as such there could be errors within the text such as sound alike errors.

## 2018-07-17 NOTE — PATIENT INSTRUCTIONS
Capsulitis of the Second Toe  What is Capsulitis of the Second Toe?  Ligaments surrounding the joint at the base of the second toe form a capsule, which helps the joint to function properly. Capsulitis is a condition in which these ligaments have become inflamed.  Although capsulitis can also occur in the joints of the third or fourth toes, it most commonly affects the second toe. This inflammation causes considerable discomfort and, if left untreated, can eventually lead to a weakening of surrounding ligaments that can cause dislocation of the toe. Capsulitis--also referred to as predislocation syndrome--is a common condition that can occur at any age.  Causes  It is generally believed that capsulitis of the second toe is a result of abnormal foot mechanics, where the ball of the foot beneath the toe joint takes an excessive amount of weight-bearing pressure.  Certain conditions or characteristics can make a person prone to experiencing excessive pressure on the ball of the foot. These most commonly include a severe bunion deformity, a second toe longer than the big toe, an arch that is structurally unstable, and a tight calf muscle.  Symptoms  Because capsulitis of the second toe is a progressive disorder and usually worsens if left untreated, early recognition and treatment are important. In the earlier stages--the best time to seek treatment--the symptoms may include:   Pain, particularly on the ball of the foot. It can feel like theres a marble in the shoe or a sock is bunched up   Swelling in the area of pain, including the base of the toe   Difficulty wearing shoes   Pain when walking barefoot   In more advanced stages, the supportive ligaments weaken leading to failure of the joint to stabilize the toe. The unstable toe drifts toward the big toe and eventually crosses over and lies on top of the big toe--resulting in crossover toe, the end stage of capsulitis. The symptoms of crossover toe are the same  as those experienced during the earlier stages. Although the crossing over of the toe usually occurs over a period of time, it can appear more quickly if caused by injury or overuse.  Diagnosis  An accurate diagnosis is essential because the symptoms of capsulitis can be similar to those of a condition called Stewarts neuroma, which is treated differently from capsulitis.   In arriving at a diagnosis, the foot and ankle surgeon will examine the foot, press on it, and maneuver it to reproduce the symptoms. The surgeon will also look for potential causes and test the stability of the joint. X-rays are usually ordered, and other imaging studies are sometimes needed.  Non-surgical Treatment  The best time to treat capsulitis of the second toe is during the early stages, before the toe starts to drift toward the big toe. At that time, non-surgical approaches can be used to stabilize the joint, reduce the symptoms, and address the underlying cause of the condition.   The foot and ankle surgeon may select one or more of the following options for early treatment of capsulitis:  Rest and ice. Staying off the foot and applying ice packs help reduce the swelling and pain. Apply an ice pack, placing a thin towel between the ice and the skin. Use ice for 20 minutes and then wait at least 40 minutes before icing again.   Oral medications. Nonsteroidal anti-inflammatory drugs (NSAIDs), such as ibuprofen, may help relieve the pain and inflammation.   Taping/splinting. It may be necessary to tape the toe so that it will stay in the correct position. This helps relieve the pain and prevent further drifting of the toe.   Stretching. Stretching exercises may be prescribed for patients who have tight calf muscles.   Shoe modifications. Supportive shoes with stiff soles are recommended because they control the motion and lessen the amount of pressure on the ball of the foot.   Orthotic devices. Custom shoe inserts are often very  beneficial. These include arch supports or a metatarsal pad that distributes the weight away from the joint.   When is Surgery Needed?  Once the second toe starts moving toward the big toe, it will never go back to its normal position unless surgery is performed. The foot and ankle surgeon will select the procedure or combination of procedures best suited to the individual patient.

## 2018-07-19 ENCOUNTER — OFFICE VISIT (OUTPATIENT)
Dept: HEMATOLOGY/ONCOLOGY | Facility: CLINIC | Age: 73
End: 2018-07-19
Payer: MEDICARE

## 2018-07-19 VITALS
RESPIRATION RATE: 18 BRPM | HEIGHT: 65 IN | OXYGEN SATURATION: 99 % | BODY MASS INDEX: 26.19 KG/M2 | DIASTOLIC BLOOD PRESSURE: 70 MMHG | WEIGHT: 157.19 LBS | SYSTOLIC BLOOD PRESSURE: 184 MMHG | HEART RATE: 71 BPM | TEMPERATURE: 98 F

## 2018-07-19 DIAGNOSIS — D50.0 IRON DEFICIENCY ANEMIA DUE TO CHRONIC BLOOD LOSS: Primary | ICD-10-CM

## 2018-07-19 DIAGNOSIS — N18.9 ANEMIA ASSOCIATED WITH CHRONIC RENAL FAILURE: ICD-10-CM

## 2018-07-19 DIAGNOSIS — D63.1 ANEMIA ASSOCIATED WITH CHRONIC RENAL FAILURE: ICD-10-CM

## 2018-07-19 DIAGNOSIS — N18.4 ANEMIA IN STAGE 4 CHRONIC KIDNEY DISEASE: ICD-10-CM

## 2018-07-19 DIAGNOSIS — D63.1 ANEMIA IN STAGE 4 CHRONIC KIDNEY DISEASE: ICD-10-CM

## 2018-07-19 PROCEDURE — 99999 PR PBB SHADOW E&M-EST. PATIENT-LVL III: CPT | Mod: PBBFAC,,, | Performed by: INTERNAL MEDICINE

## 2018-07-19 PROCEDURE — 3077F SYST BP >= 140 MM HG: CPT | Mod: CPTII,S$GLB,, | Performed by: INTERNAL MEDICINE

## 2018-07-19 PROCEDURE — 3078F DIAST BP <80 MM HG: CPT | Mod: CPTII,S$GLB,, | Performed by: INTERNAL MEDICINE

## 2018-07-19 PROCEDURE — 99214 OFFICE O/P EST MOD 30 MIN: CPT | Mod: S$GLB,,, | Performed by: INTERNAL MEDICINE

## 2018-07-31 ENCOUNTER — LAB VISIT (OUTPATIENT)
Dept: LAB | Facility: HOSPITAL | Age: 73
End: 2018-07-31
Attending: INTERNAL MEDICINE
Payer: MEDICARE

## 2018-07-31 DIAGNOSIS — I10 ESSENTIAL HYPERTENSION: ICD-10-CM

## 2018-07-31 DIAGNOSIS — E11.29 PROTEINURIA DUE TO TYPE 2 DIABETES MELLITUS: ICD-10-CM

## 2018-07-31 DIAGNOSIS — N25.81 SECONDARY HYPERPARATHYROIDISM: ICD-10-CM

## 2018-07-31 DIAGNOSIS — E87.20 METABOLIC ACIDEMIA: ICD-10-CM

## 2018-07-31 DIAGNOSIS — E11.21 DIABETIC NEPHROPATHY ASSOCIATED WITH TYPE 2 DIABETES MELLITUS: ICD-10-CM

## 2018-07-31 DIAGNOSIS — N18.4 ANEMIA OF CHRONIC RENAL FAILURE, STAGE 4 (SEVERE): ICD-10-CM

## 2018-07-31 DIAGNOSIS — N18.4 CHRONIC KIDNEY DISEASE, STAGE IV (SEVERE): ICD-10-CM

## 2018-07-31 DIAGNOSIS — R80.9 PROTEINURIA DUE TO TYPE 2 DIABETES MELLITUS: ICD-10-CM

## 2018-07-31 DIAGNOSIS — D63.1 ANEMIA OF CHRONIC RENAL FAILURE, STAGE 4 (SEVERE): ICD-10-CM

## 2018-07-31 DIAGNOSIS — E11.21 NEPHROTIC SYNDROME DUE TO DIABETES MELLITUS: ICD-10-CM

## 2018-07-31 DIAGNOSIS — E55.9 VITAMIN D DEFICIENCY: ICD-10-CM

## 2018-07-31 LAB
25(OH)D3+25(OH)D2 SERPL-MCNC: 40 NG/ML
ALBUMIN SERPL BCP-MCNC: 3.4 G/DL
ANION GAP SERPL CALC-SCNC: 9 MMOL/L
BASOPHILS # BLD AUTO: 0.01 K/UL
BASOPHILS NFR BLD: 0.2 %
BUN SERPL-MCNC: 40 MG/DL
CALCIUM SERPL-MCNC: 8.9 MG/DL
CHLORIDE SERPL-SCNC: 112 MMOL/L
CO2 SERPL-SCNC: 22 MMOL/L
CREAT SERPL-MCNC: 2.7 MG/DL
DIFFERENTIAL METHOD: ABNORMAL
EOSINOPHIL # BLD AUTO: 0.1 K/UL
EOSINOPHIL NFR BLD: 2.4 %
ERYTHROCYTE [DISTWIDTH] IN BLOOD BY AUTOMATED COUNT: 12.5 %
EST. GFR  (AFRICAN AMERICAN): 19.7 ML/MIN/1.73 M^2
EST. GFR  (NON AFRICAN AMERICAN): 17.1 ML/MIN/1.73 M^2
GLUCOSE SERPL-MCNC: 169 MG/DL
HCT VFR BLD AUTO: 30.3 %
HGB BLD-MCNC: 9.6 G/DL
IMM GRANULOCYTES # BLD AUTO: 0.02 K/UL
IMM GRANULOCYTES NFR BLD AUTO: 0.4 %
LYMPHOCYTES # BLD AUTO: 1 K/UL
LYMPHOCYTES NFR BLD: 20.6 %
MCH RBC QN AUTO: 30.2 PG
MCHC RBC AUTO-ENTMCNC: 31.7 G/DL
MCV RBC AUTO: 95 FL
MONOCYTES # BLD AUTO: 0.4 K/UL
MONOCYTES NFR BLD: 7.5 %
NEUTROPHILS # BLD AUTO: 3.4 K/UL
NEUTROPHILS NFR BLD: 68.9 %
NRBC BLD-RTO: 0 /100 WBC
PHOSPHATE SERPL-MCNC: 3.6 MG/DL
PLATELET # BLD AUTO: 203 K/UL
PMV BLD AUTO: 9.9 FL
POTASSIUM SERPL-SCNC: 4.3 MMOL/L
RBC # BLD AUTO: 3.18 M/UL
SODIUM SERPL-SCNC: 143 MMOL/L
WBC # BLD AUTO: 4.95 K/UL

## 2018-07-31 PROCEDURE — 85025 COMPLETE CBC W/AUTO DIFF WBC: CPT

## 2018-07-31 PROCEDURE — 82610 CYSTATIN C: CPT

## 2018-07-31 PROCEDURE — 36415 COLL VENOUS BLD VENIPUNCTURE: CPT | Mod: PO

## 2018-07-31 PROCEDURE — 82306 VITAMIN D 25 HYDROXY: CPT

## 2018-07-31 PROCEDURE — 80069 RENAL FUNCTION PANEL: CPT

## 2018-08-01 LAB
CYSTATIN C SERPL-MCNC: 2.12 MG/L
GFR/BSA.PRED SERPLBLD CYS-BASED-ARV: 26 ML/MIN/BSA

## 2018-08-07 ENCOUNTER — OFFICE VISIT (OUTPATIENT)
Dept: NEPHROLOGY | Facility: CLINIC | Age: 73
End: 2018-08-07
Payer: MEDICARE

## 2018-08-07 ENCOUNTER — TELEPHONE (OUTPATIENT)
Dept: RESEARCH | Facility: HOSPITAL | Age: 73
End: 2018-08-07

## 2018-08-07 ENCOUNTER — OFFICE VISIT (OUTPATIENT)
Dept: PAIN MEDICINE | Facility: CLINIC | Age: 73
End: 2018-08-07
Payer: MEDICARE

## 2018-08-07 VITALS
SYSTOLIC BLOOD PRESSURE: 180 MMHG | OXYGEN SATURATION: 98 % | HEIGHT: 65 IN | TEMPERATURE: 98 F | HEART RATE: 69 BPM | DIASTOLIC BLOOD PRESSURE: 70 MMHG | WEIGHT: 159.19 LBS | BODY MASS INDEX: 26.52 KG/M2

## 2018-08-07 VITALS
SYSTOLIC BLOOD PRESSURE: 180 MMHG | HEIGHT: 65 IN | BODY MASS INDEX: 26.52 KG/M2 | WEIGHT: 159.19 LBS | HEART RATE: 78 BPM | DIASTOLIC BLOOD PRESSURE: 70 MMHG

## 2018-08-07 DIAGNOSIS — E11.21 DIABETIC NEPHROPATHY ASSOCIATED WITH TYPE 2 DIABETES MELLITUS: ICD-10-CM

## 2018-08-07 DIAGNOSIS — N18.4 CHRONIC KIDNEY DISEASE, STAGE IV (SEVERE): Primary | ICD-10-CM

## 2018-08-07 DIAGNOSIS — R80.9 PROTEINURIA DUE TO TYPE 2 DIABETES MELLITUS: ICD-10-CM

## 2018-08-07 DIAGNOSIS — N18.4 ANEMIA OF CHRONIC RENAL FAILURE, STAGE 4 (SEVERE): ICD-10-CM

## 2018-08-07 DIAGNOSIS — M47.816 LUMBAR FACET ARTHROPATHY: ICD-10-CM

## 2018-08-07 DIAGNOSIS — M47.816 LUMBAR SPONDYLOSIS: Primary | ICD-10-CM

## 2018-08-07 DIAGNOSIS — E87.20 METABOLIC ACIDEMIA: ICD-10-CM

## 2018-08-07 DIAGNOSIS — E11.21 NEPHROTIC SYNDROME DUE TO DIABETES MELLITUS: ICD-10-CM

## 2018-08-07 DIAGNOSIS — E55.9 VITAMIN D DEFICIENCY: ICD-10-CM

## 2018-08-07 DIAGNOSIS — I10 ESSENTIAL HYPERTENSION: ICD-10-CM

## 2018-08-07 DIAGNOSIS — E11.29 PROTEINURIA DUE TO TYPE 2 DIABETES MELLITUS: ICD-10-CM

## 2018-08-07 DIAGNOSIS — N25.81 SECONDARY HYPERPARATHYROIDISM: ICD-10-CM

## 2018-08-07 DIAGNOSIS — D63.1 ANEMIA OF CHRONIC RENAL FAILURE, STAGE 4 (SEVERE): ICD-10-CM

## 2018-08-07 PROCEDURE — 3078F DIAST BP <80 MM HG: CPT | Mod: CPTII,,, | Performed by: ANESTHESIOLOGY

## 2018-08-07 PROCEDURE — 99214 OFFICE O/P EST MOD 30 MIN: CPT | Mod: S$GLB,ICN,, | Performed by: ANESTHESIOLOGY

## 2018-08-07 PROCEDURE — 99499 UNLISTED E&M SERVICE: CPT | Mod: S$GLB,,, | Performed by: ANESTHESIOLOGY

## 2018-08-07 PROCEDURE — 3044F HG A1C LEVEL LT 7.0%: CPT | Mod: CPTII,S$GLB,, | Performed by: INTERNAL MEDICINE

## 2018-08-07 PROCEDURE — 99999 PR PBB SHADOW E&M-EST. PATIENT-LVL III: CPT | Mod: PBBFAC,,, | Performed by: ANESTHESIOLOGY

## 2018-08-07 PROCEDURE — 3077F SYST BP >= 140 MM HG: CPT | Mod: CPTII,,, | Performed by: ANESTHESIOLOGY

## 2018-08-07 PROCEDURE — 99214 OFFICE O/P EST MOD 30 MIN: CPT | Mod: S$GLB,,, | Performed by: INTERNAL MEDICINE

## 2018-08-07 PROCEDURE — 3077F SYST BP >= 140 MM HG: CPT | Mod: CPTII,S$GLB,, | Performed by: INTERNAL MEDICINE

## 2018-08-07 PROCEDURE — 99999 PR PBB SHADOW E&M-EST. PATIENT-LVL III: CPT | Mod: PBBFAC,,, | Performed by: INTERNAL MEDICINE

## 2018-08-07 PROCEDURE — 3078F DIAST BP <80 MM HG: CPT | Mod: CPTII,S$GLB,, | Performed by: INTERNAL MEDICINE

## 2018-08-07 PROCEDURE — 99499 UNLISTED E&M SERVICE: CPT | Mod: S$GLB,,, | Performed by: INTERNAL MEDICINE

## 2018-08-07 RX ORDER — TORSEMIDE 20 MG/1
20 TABLET ORAL DAILY
Qty: 30 TABLET | Refills: 11 | Status: SHIPPED | OUTPATIENT
Start: 2018-08-07 | End: 2019-01-04 | Stop reason: SDUPTHER

## 2018-08-07 RX ORDER — HYDRALAZINE HYDROCHLORIDE 50 MG/1
50 TABLET, FILM COATED ORAL 2 TIMES DAILY
Qty: 180 TABLET | Refills: 3 | Status: ON HOLD | OUTPATIENT
Start: 2018-08-07 | End: 2018-12-04 | Stop reason: HOSPADM

## 2018-08-07 NOTE — LETTER
August 7, 2018      Kim Doe MD  [address] [place] [place] [address]           [facility]  [address] [place] [address]  Phone: [phone]  Fax: [phone]          Patient: Avril Monzon   MR Number: [id number]   YOB: 1945   Date of Visit: 8/7/2018       Dear Dr. Kim Doe:    Thank you for referring Avril Monzon to me for evaluation. Attached you will find relevant portions of my assessment and plan of care.    If you have questions, please do not hesitate to call me. I look forward to following Avril Monzon along with you.    Sincerely,    Sung Fenton MD    Enclosure  CC:  No Recipients    If you would like to receive this communication electronically, please contact [email or web address] or [phone] to request more information on [employer] access.    For providers and/or their staff who would like to refer a patient to [facility], please contact us through our one-stop-shop provider referral line, [facility], at [phone].    If you feel you have received this communication in error or would no longer like to receive these types of communications, please e-mail [email or web address]

## 2018-08-07 NOTE — PROGRESS NOTES
Chief Pain Complaint:  Consult and Mid-back Pain        History of Present Illness:   Avril Monzon is a 73 y.o. female  who is presenting with a chief complaint of Consult and Mid-back Pain  . The patient began experiencing this problem insidiously, and the pain has been gradually worsening over the past 2 year(s). The pain is described as throbbing, cramping, aching and heavy and is located in the bilateral lumbar spine. Pain is intermittent and lasts hours. The  pain is nonradiating. The patient rates her pain a 8 out of ten and interferes with activities of daily living a 7 out of ten. Pain is exacerbated by extension of the lumbar spine, prolonged standing, and is improved by rest. Patient reports no prior trauma, no prior spinal surgery     - pertinent negatives: No fever, No chills, No weight loss, No bladder dysfunction, No bowel dysfunction, No saddle anesthesia  - pertinent positives: none    - medications, other therapies tried (physical therapy, injections):     >> Tylenol, Tramadol and zanaflex    >> Has previously undergone Physical Therapy    >> Has NOT previously undergone spinal injection/s      Imaging / Labs / Studies (reviewed on 8/7/2018):    Results for orders placed during the hospital encounter of 01/28/16   MRI Lumbar Spine Without Contrast    Narrative MRI lumbar spine without contrast.01/28/16 14:28:12    History:  M47.817 Spondylosis without myelopathy or radiculopathy, lumbosacral region.  low back pain    Standard multiplanar noncontrast MRI sequences of the lumbar spine.    The tip of the conus is at the L1 level.  There is no abnormal signal in the cord.    T12-L1: Normal    L1-2: Normal    L2-3: Normal.    L3-4: Normal.    L4-5: Desiccation of the disk.  Broad-based disk bulge.  Bilateral degenerative changes of the facets.  Narrowing of the lateral recesses with possible but not definite L4 nerve root impingement.  Bilateral facet hypertrophy.    L5-S1:  Normal signal in the disk.   Mild degenerative changes of the facets.    Impression     Desiccation of the disk at the L4-L5 level a circumferential disk bulge.  Bulging disk material in the inferior recess of the neural foramina on the left side with possible left sided L4 nerve root impingement.  Mild bilateral degenerative change of facets at the L4-L5 level.      Electronically signed by: MAGUI SOUSA MD  Date:     01/28/16  Time:    15:19                              Results for orders placed during the hospital encounter of 10/22/15   X-Ray Lumbar Spine Complete 5 View    Narrative Comparison: None     5 views    Findings:    Overlying bowel and atherosclerotic vascular calcification combine to limit the study.  Vertebral body height and alignment well maintained.  Multilevel marginal spurring and facet arthropathy.  Uniform loss of discoid at the L4 -- 5 level.  No acute fracture or subluxation.  No spondylolysis or spondylolisthesis.  Pedicles and SI joints appear intact.  Minimal degenerative change bilateral hips.    Impression       Mildly limited exam.    Spondylosis without acute fracture or subluxation.  Further evaluation and/or follow-up imaging as warranted.    Additional findings as above.        Electronically signed by: PAULINA GARCIA III, MD  Date:     10/22/15  Time:    15:05          Review of Systems:  CONSTITUTIONAL: patient denies any fever, chills, or weight loss  SKIN: patient denies any rash or itching  RESPIRATORY: patient denies having any shortness of breath  GASTROINTESTINAL: patient denies having any diarrhea, constipation, or bowel incontinence  GENITOURINARY: patient denies having any abnormal bladder function    MUSCULOSKELETAL:  - patient complains of the above noted pain/s (see chief pain complaint)    NEUROLOGICAL:   - pain as above  - strength in Lower extremities is intact, BILATERALLY  - sensation in Lower extremities is intact, BILATERALLY  - patient denies any loss of bowel or bladder control     "  PSYCHIATRIC: patient denies any change in mood    Other:  All other systems reviewed and are negative      Physical Exam:  BP (!) 180/70 (BP Location: Right arm, Patient Position: Sitting)   Pulse 69   Temp 98 °F (36.7 °C)   Ht 5' 5" (1.651 m)   Wt 72.2 kg (159 lb 2.8 oz)   SpO2 98%   BMI 26.49 kg/m²  (reviewed on 8/7/2018)  General: Alert and oriented, in no apparent distress.  Gait: normal gait.  Skin: No rashes, No discoloration, No obvious lesions  HEENT: Normocephalic, atraumatic. Pupils equal and round.  Cardiovascular: Regular rate and rhythm , no significant peripheral edema present  Respiratory: Without audible wheezing, without use of accessory muscles of respiration.    Musculoskeletal:    Lumbar Spine    - Pain on flexion of lumbar spine Absent  - Straight Leg Raise:  Absent    - Pain on extension of lumbar spine Present  - TTP over the lumbar facet joints Present Bilateral L5-S1  - Lumbar facet loading Present    -Pain on palpation over the SI joint  Absent  - LARISSA: Absent      Neuro:    Strength:  LE R/L: HF: 5/5, HE: 5/5, KF: 5/5; KE: 5/5; FE: 5/5; FF: 5/5    Extremity Reflexes: Brisk and symmetric throughout.      Extremity Sensory: Sensation to pinprick and temperature symmetric. Proprioception intact.      Psych:  Mood and affect is appropriate      Assessment:    Avril Monzon is a 73 y.o. year old female who is presenting with     Encounter Diagnoses   Name Primary?    Lumbar spondylosis Yes    Lumbar facet arthropathy        Plan:    1. Interventional: Schedule patient for bilateral L3, L4, L5 MBB with local.    2. Pharmacologic: Tylenol PRN. No NSAID's secondary to CKD.    3. Rehabilitative: PT post injection.    4. Diagnostic: None for now. Lumbar MRI reviewed with patient.     5. Follow up: Follow-up for After Injection.      20 minutes were spent in this encounter with more than 50% of the time used for counseling and review of the plan.  Imaging / studies reviewed, detailed " above.  I discussed in detail the risks, benefits, and alternatives to any and all potential treatment options.  All questions and concerns were fully addressed today in clinic. Medical decision making moderate.    Thank you for the opportunity to assist in the care of this patient.    Best wishes,    Signed:    Sung Fenton MD          Disclaimer:  This note may have been prepared using voice recognition software, it may have not been extensively proofed, as such there could be errors within the text such as sound alike errors.

## 2018-08-07 NOTE — PROGRESS NOTES
Subjective:       Patient ID: Avril Monzon is a 73 y.o. Black or  female who presents for re-evaluation of progression of Chronic Kidney Disease; Anemia; Hypertension; hyperparathyroid of renal origin; and Vitamin D Deficiency    Anemia   There has been no bruising/bleeding easily, fever or palpitations.   Hypertension   Pertinent negatives include no chest pain, headaches, palpitations or shortness of breath.        Patient is a 74 YO AAF with diabetic nephropathy for many years. Now has CKD-IV and Hx of retinopathy.      Has seen dr. Hanson in the past     10/2017 Rayaldee ; low sugars d/w patient     2/2018 off epo since 11/2017;   rayaldee     7/2018 DNQ ASCEND study     August 2018 GFR 26 % with a creatinine of 2.7 sign up for anemia study ; torsemide 20 mg for LE edema     Review of Systems   Constitutional: Negative for activity change, appetite change, chills, diaphoresis, fatigue, fever and unexpected weight change.   HENT: Negative for congestion, dental problem, drooling, postnasal drip, rhinorrhea and voice change.    Eyes: Negative for discharge.   Respiratory: Negative for apnea, cough, choking, chest tightness, shortness of breath, wheezing and stridor.    Cardiovascular: Negative for chest pain, palpitations and leg swelling.   Gastrointestinal: Negative for abdominal distention, blood in stool, constipation, diarrhea, nausea, rectal pain and vomiting.   Endocrine: Negative for cold intolerance, heat intolerance, polydipsia and polyuria.   Genitourinary: Negative for decreased urine volume, difficulty urinating, dysuria, enuresis, flank pain, frequency, hematuria and urgency.   Musculoskeletal: Negative for arthralgias, back pain, gait problem and joint swelling.   Skin: Negative for rash.   Allergic/Immunologic: Negative for food allergies and immunocompromised state.   Neurological: Negative for dizziness, tremors, syncope, numbness and headaches.   Hematological: Does not  "bruise/bleed easily.   Psychiatric/Behavioral: Negative for agitation, behavioral problems and self-injury. The patient is not nervous/anxious and is not hyperactive.    All other systems reviewed and are negative.      Objective:   BP (!) 180/70   Pulse 78   Ht 5' 5" (1.651 m)   Wt 72.2 kg (159 lb 2.8 oz)   BMI 26.49 kg/m²      Physical Exam   Constitutional: She is oriented to person, place, and time. No distress.   HENT:   Head: Normocephalic and atraumatic.   Nose: Nose normal.   Eyes: Conjunctivae and EOM are normal. Pupils are equal, round, and reactive to light.   Neck: Normal range of motion. No JVD present. No tracheal deviation present. No thyromegaly present.   Cardiovascular: Normal rate, regular rhythm, normal heart sounds and intact distal pulses.  Exam reveals no gallop and no friction rub.    No murmur heard.  Pulmonary/Chest: Effort normal and breath sounds normal. No respiratory distress. She has no wheezes. She has no rales. She exhibits no tenderness.   Abdominal: Soft. Bowel sounds are normal. She exhibits no distension and no mass. There is no tenderness. No hernia.   Musculoskeletal: Normal range of motion. She exhibits edema. She exhibits no tenderness or deformity.   Trace edema   Neurological: She is alert and oriented to person, place, and time. She has normal reflexes. She displays normal reflexes. No cranial nerve deficit. She exhibits normal muscle tone. Coordination normal.   Skin: Skin is warm. She is not diaphoretic. No erythema. There is pallor.   Psychiatric: She has a normal mood and affect. Her behavior is normal. Judgment and thought content normal.   Nursing note and vitals reviewed.        Lab Results   Component Value Date    CREATININE 2.7 (H) 07/31/2018    BUN 40 (H) 07/31/2018     07/31/2018    K 4.3 07/31/2018     (H) 07/31/2018    CO2 22 (L) 07/31/2018     Lab Results   Component Value Date    WBC 4.95 07/31/2018    HGB 9.6 (L) 07/31/2018    HCT 30.3 (L) " 07/31/2018    MCV 95 07/31/2018     07/31/2018     Lab Results   Component Value Date    .0 (H) 01/23/2018    CALCIUM 8.9 07/31/2018    PHOS 3.6 07/31/2018     Vitamin D level is 8checked on January 2018; August 2018 level = 40 on Rayaldee    Assessment:    )    1. Chronic kidney disease, stage IV (severe)    2. Essential hypertension    3. Secondary hyperparathyroidism    4. Anemia of chronic renal failure, stage 4 (severe)    5. Proteinuria due to type 2 diabetes mellitus    6. Diabetic nephropathy associated with type 2 diabetes mellitus    7. Metabolic acidemia    8. Vitamin D deficiency    9. Nephrotic syndrome due to diabetes mellitus        Plan:         1. Chronic kidney Disease stage IV: GFR 26 % cystatin C; avoid NSAids;   She will need education class ; patient lives with daughter --possibly PD     2. HPT with severe vitamin D deficiency much better on vitamin D  Rayaldee 30 microgram daily.   Calcium and phosphorus are stable    3. Anemia:  study --ASCEND-ND      4. HTN : uncontrolled at home and on re-check ; add torsemide 20 mg and increase hydralazine 50 bid     5. Protienuria: in remission      6:Acidosis: better on sodium bicarbonate    7: LBP: MRI reviewed : re-consult dr. Fenton    8.  Hyperkalemia: much better     9. Tonic water for leg cramps      10. type 2 diabetes with hypoglycemia:d/w patient to avoid low sugars       follow up in 3 months       Kim Doe MD

## 2018-08-08 ENCOUNTER — TELEPHONE (OUTPATIENT)
Dept: RESEARCH | Facility: HOSPITAL | Age: 73
End: 2018-08-08

## 2018-08-16 ENCOUNTER — TELEPHONE (OUTPATIENT)
Dept: INTERNAL MEDICINE | Facility: CLINIC | Age: 73
End: 2018-08-16

## 2018-08-16 NOTE — TELEPHONE ENCOUNTER
----- Message from Melissa Don sent at 8/16/2018  1:00 PM CDT -----  Contact: pt  The pt states she needs a refill on her lisinopril medication, the pt has been out of her meds for two weeks, the pt can be reached at [phone]///[id number]

## 2018-08-21 RX ORDER — LISINOPRIL 20 MG/1
40 TABLET ORAL DAILY
Qty: 180 TABLET | Refills: 3 | Status: ON HOLD | OUTPATIENT
Start: 2018-08-21 | End: 2018-12-04 | Stop reason: HOSPADM

## 2018-11-05 ENCOUNTER — OFFICE VISIT (OUTPATIENT)
Dept: OPHTHALMOLOGY | Facility: CLINIC | Age: 73
End: 2018-11-05
Payer: MEDICARE

## 2018-11-05 ENCOUNTER — IMMUNIZATION (OUTPATIENT)
Dept: PHARMACY | Facility: CLINIC | Age: 73
End: 2018-11-05
Payer: MEDICARE

## 2018-11-05 DIAGNOSIS — E11.3553 TYPE 2 DIABETES MELLITUS WITH STABLE PROLIFERATIVE RETINOPATHY OF BOTH EYES, WITH LONG-TERM CURRENT USE OF INSULIN: Primary | ICD-10-CM

## 2018-11-05 DIAGNOSIS — H25.11 CATARACTA BRUNESCENS OF RIGHT EYE: ICD-10-CM

## 2018-11-05 DIAGNOSIS — E11.3553 CONTROLLED TYPE 2 DIABETES MELLITUS WITH STABLE PROLIFERATIVE RETINOPATHY OF BOTH EYES, WITH LONG-TERM CURRENT USE OF INSULIN: ICD-10-CM

## 2018-11-05 DIAGNOSIS — Z79.4 TYPE 2 DIABETES MELLITUS WITH STABLE PROLIFERATIVE RETINOPATHY OF BOTH EYES, WITH LONG-TERM CURRENT USE OF INSULIN: Primary | ICD-10-CM

## 2018-11-05 DIAGNOSIS — Z79.4 CONTROLLED TYPE 2 DIABETES MELLITUS WITH STABLE PROLIFERATIVE RETINOPATHY OF BOTH EYES, WITH LONG-TERM CURRENT USE OF INSULIN: ICD-10-CM

## 2018-11-05 PROCEDURE — 92250 FUNDUS PHOTOGRAPHY W/I&R: CPT | Mod: S$GLB,,, | Performed by: OPHTHALMOLOGY

## 2018-11-05 PROCEDURE — 92014 COMPRE OPH EXAM EST PT 1/>: CPT | Mod: S$GLB,,, | Performed by: OPHTHALMOLOGY

## 2018-11-05 PROCEDURE — 99999 PR PBB SHADOW E&M-EST. PATIENT-LVL II: CPT | Mod: PBBFAC,,, | Performed by: OPHTHALMOLOGY

## 2018-11-05 NOTE — PROGRESS NOTES
===============================  11/05/2018   Avril Monzon,   73 y.o. female   Last visit [facility]: :2/20/2018   Last visit eye dept. Visit date not found  [facility]:  Corrected distance visual acuity was NLP in the right eye and 20/60 in the left eye.  Tonometry     Tonometry (Applanation, 9:56 AM)       Right Left    Pressure 42 16               Not recorded         Not recorded        Chief Complaint   Patient presents with    Diabetes     9 months dm exam, vision about the same as last time        HPI     Diabetes      Additional comments: 9 months dm exam, vision about the same as last   time              Comments     Noncompliant with visits    1.) DM SINCE 1995  PDR  S/P LASER AND INJECTIONS OS(DR BOX AND AT [facility])  2.) NLP OD SINCE BIRTH  3.) DENSE CAT OD / 2+NS OS  4.) Asteroid Hyalosis    EYLEA OS 1/4/17          Last edited by JOSE Alegre on 11/5/2018  9:54 AM. (History)          ________________  11/5/2018  Problem List Items Addressed This Visit        Eye/Vision problems    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, with long-term current use of insulin - Primary    Relevant Orders    Color Fundus Photography - OU - Both Eyes (Completed)    Cataracta brunescens of right eye      Other Visit Diagnoses     Controlled type 2 diabetes mellitus with stable proliferative retinopathy of both eyes, with long-term current use of insulin              .  rtc 6 mos     ===========================

## 2018-11-26 ENCOUNTER — HOSPITAL ENCOUNTER (INPATIENT)
Facility: HOSPITAL | Age: 73
LOS: 8 days | Discharge: HOME | DRG: 246 | End: 2018-12-04
Attending: EMERGENCY MEDICINE | Admitting: HOSPITALIST
Payer: MEDICARE

## 2018-11-26 DIAGNOSIS — D64.9 CHRONIC ANEMIA: ICD-10-CM

## 2018-11-26 DIAGNOSIS — I21.4 NSTEMI (NON-ST ELEVATED MYOCARDIAL INFARCTION): Primary | ICD-10-CM

## 2018-11-26 DIAGNOSIS — I10 ESSENTIAL HYPERTENSION: Chronic | ICD-10-CM

## 2018-11-26 DIAGNOSIS — N17.9 ACUTE KIDNEY INJURY SUPERIMPOSED ON CHRONIC KIDNEY DISEASE: ICD-10-CM

## 2018-11-26 DIAGNOSIS — N18.9 ACUTE KIDNEY INJURY SUPERIMPOSED ON CHRONIC KIDNEY DISEASE: ICD-10-CM

## 2018-11-26 DIAGNOSIS — J81.0 ACUTE PULMONARY EDEMA: ICD-10-CM

## 2018-11-26 DIAGNOSIS — I21.4 NON-ST ELEVATION MI (NSTEMI): ICD-10-CM

## 2018-11-26 DIAGNOSIS — N18.5 STAGE 5 CHRONIC KIDNEY DISEASE: ICD-10-CM

## 2018-11-26 DIAGNOSIS — K59.00 CONSTIPATION: ICD-10-CM

## 2018-11-26 DIAGNOSIS — I50.23 ACUTE ON CHRONIC SYSTOLIC HEART FAILURE: ICD-10-CM

## 2018-11-26 DIAGNOSIS — J96.01 ACUTE HYPOXEMIC RESPIRATORY FAILURE: ICD-10-CM

## 2018-11-26 DIAGNOSIS — E11.21 DIABETIC NEPHROPATHY ASSOCIATED WITH TYPE 2 DIABETES MELLITUS: ICD-10-CM

## 2018-11-26 DIAGNOSIS — I50.9 HEART FAILURE: ICD-10-CM

## 2018-11-26 DIAGNOSIS — R06.02 SHORTNESS OF BREATH: ICD-10-CM

## 2018-11-26 DIAGNOSIS — I24.9 ACS (ACUTE CORONARY SYNDROME): ICD-10-CM

## 2018-11-26 DIAGNOSIS — E11.65 TYPE 2 DIABETES MELLITUS WITH HYPERGLYCEMIA, WITHOUT LONG-TERM CURRENT USE OF INSULIN: ICD-10-CM

## 2018-11-26 LAB
ALBUMIN SERPL BCP-MCNC: 3.4 G/DL
ALLENS TEST: ABNORMAL
ALP SERPL-CCNC: 84 U/L
ALT SERPL W/O P-5'-P-CCNC: 13 U/L
AMORPH CRY URNS QL MICRO: ABNORMAL
ANION GAP SERPL CALC-SCNC: 14 MMOL/L
APTT BLDCRRT: 23.9 SEC
AST SERPL-CCNC: 25 U/L
BACTERIA #/AREA URNS HPF: ABNORMAL /HPF
BASOPHILS # BLD AUTO: 0 K/UL
BASOPHILS NFR BLD: 0 %
BILIRUB SERPL-MCNC: 0.8 MG/DL
BILIRUB UR QL STRIP: NEGATIVE
BNP SERPL-MCNC: 1613 PG/ML
BUN SERPL-MCNC: 69 MG/DL
CALCIUM SERPL-MCNC: 9.2 MG/DL
CHLORIDE SERPL-SCNC: 110 MMOL/L
CLARITY UR: CLEAR
CO2 SERPL-SCNC: 17 MMOL/L
COLOR UR: YELLOW
CREAT SERPL-MCNC: 4.1 MG/DL
DELSYS: ABNORMAL
DIFFERENTIAL METHOD: ABNORMAL
EOSINOPHIL # BLD AUTO: 0 K/UL
EOSINOPHIL NFR BLD: 0 %
EP: 6
ERYTHROCYTE [DISTWIDTH] IN BLOOD BY AUTOMATED COUNT: 13.2 %
ERYTHROCYTE [SEDIMENTATION RATE] IN BLOOD BY WESTERGREN METHOD: 10 MM/H
EST. GFR  (AFRICAN AMERICAN): 12 ML/MIN/1.73 M^2
EST. GFR  (NON AFRICAN AMERICAN): 10 ML/MIN/1.73 M^2
FIO2: 100
GLUCOSE SERPL-MCNC: 336 MG/DL
GLUCOSE UR QL STRIP: ABNORMAL
HCO3 UR-SCNC: 16.4 MMOL/L (ref 24–28)
HCT VFR BLD AUTO: 31.3 %
HGB BLD-MCNC: 10.1 G/DL
HGB UR QL STRIP: ABNORMAL
HYALINE CASTS #/AREA URNS LPF: 0 /LPF
INR PPP: 0.9
IP: 12
KETONES UR QL STRIP: NEGATIVE
LACTATE SERPL-SCNC: 1.5 MMOL/L
LACTATE SERPL-SCNC: 2.7 MMOL/L
LEUKOCYTE ESTERASE UR QL STRIP: NEGATIVE
LYMPHOCYTES # BLD AUTO: 0.5 K/UL
LYMPHOCYTES NFR BLD: 4.7 %
MCH RBC QN AUTO: 30.4 PG
MCHC RBC AUTO-ENTMCNC: 32.3 G/DL
MCV RBC AUTO: 94 FL
MICROSCOPIC COMMENT: ABNORMAL
MODE: ABNORMAL
MONOCYTES # BLD AUTO: 0.5 K/UL
MONOCYTES NFR BLD: 4.7 %
NEUTROPHILS # BLD AUTO: 9.4 K/UL
NEUTROPHILS NFR BLD: 90.6 %
NITRITE UR QL STRIP: NEGATIVE
PCO2 BLDA: 31.5 MMHG (ref 35–45)
PH SMN: 7.32 [PH] (ref 7.35–7.45)
PH UR STRIP: 6 [PH] (ref 5–8)
PLATELET # BLD AUTO: 223 K/UL
PMV BLD AUTO: 10.1 FL
PO2 BLDA: 144 MMHG (ref 80–100)
POC BE: -10 MMOL/L
POC SATURATED O2: 99 % (ref 95–100)
POTASSIUM SERPL-SCNC: 4.1 MMOL/L
PROCALCITONIN SERPL IA-MCNC: 0.66 NG/ML
PROT SERPL-MCNC: 7.2 G/DL
PROT UR QL STRIP: ABNORMAL
PROTHROMBIN TIME: 10.1 SEC
RBC # BLD AUTO: 3.32 M/UL
RBC #/AREA URNS HPF: 1 /HPF (ref 0–4)
SAMPLE: ABNORMAL
SITE: ABNORMAL
SODIUM SERPL-SCNC: 141 MMOL/L
SP GR UR STRIP: 1.02 (ref 1–1.03)
TROPONIN I SERPL DL<=0.01 NG/ML-MCNC: 4.42 NG/ML
URN SPEC COLLECT METH UR: ABNORMAL
UROBILINOGEN UR STRIP-ACNC: NEGATIVE EU/DL
WBC # BLD AUTO: 10.37 K/UL
WBC #/AREA URNS HPF: 1 /HPF (ref 0–5)

## 2018-11-26 PROCEDURE — 93010 ELECTROCARDIOGRAM REPORT: CPT | Mod: ,,, | Performed by: INTERNAL MEDICINE

## 2018-11-26 PROCEDURE — 85730 THROMBOPLASTIN TIME PARTIAL: CPT

## 2018-11-26 PROCEDURE — 87040 BLOOD CULTURE FOR BACTERIA: CPT | Mod: 59

## 2018-11-26 PROCEDURE — 25000003 PHARM REV CODE 250: Performed by: HOSPITALIST

## 2018-11-26 PROCEDURE — 96372 THER/PROPH/DIAG INJ SC/IM: CPT

## 2018-11-26 PROCEDURE — 96375 TX/PRO/DX INJ NEW DRUG ADDON: CPT | Mod: 59

## 2018-11-26 PROCEDURE — 11000001 HC ACUTE MED/SURG PRIVATE ROOM

## 2018-11-26 PROCEDURE — 82803 BLOOD GASES ANY COMBINATION: CPT

## 2018-11-26 PROCEDURE — 84145 PROCALCITONIN (PCT): CPT

## 2018-11-26 PROCEDURE — 83605 ASSAY OF LACTIC ACID: CPT | Mod: 91

## 2018-11-26 PROCEDURE — 84484 ASSAY OF TROPONIN QUANT: CPT | Mod: 91

## 2018-11-26 PROCEDURE — 83880 ASSAY OF NATRIURETIC PEPTIDE: CPT

## 2018-11-26 PROCEDURE — 82800 BLOOD PH: CPT

## 2018-11-26 PROCEDURE — 93005 ELECTROCARDIOGRAM TRACING: CPT

## 2018-11-26 PROCEDURE — 36430 TRANSFUSION BLD/BLD COMPNT: CPT

## 2018-11-26 PROCEDURE — 94660 CPAP INITIATION&MGMT: CPT

## 2018-11-26 PROCEDURE — 80053 COMPREHEN METABOLIC PANEL: CPT

## 2018-11-26 PROCEDURE — 99291 CRITICAL CARE FIRST HOUR: CPT | Mod: 25

## 2018-11-26 PROCEDURE — 63600175 PHARM REV CODE 636 W HCPCS: Performed by: HOSPITALIST

## 2018-11-26 PROCEDURE — 82962 GLUCOSE BLOOD TEST: CPT

## 2018-11-26 PROCEDURE — 63600175 PHARM REV CODE 636 W HCPCS: Performed by: EMERGENCY MEDICINE

## 2018-11-26 PROCEDURE — 25000003 PHARM REV CODE 250: Performed by: EMERGENCY MEDICINE

## 2018-11-26 PROCEDURE — 96374 THER/PROPH/DIAG INJ IV PUSH: CPT

## 2018-11-26 PROCEDURE — 81000 URINALYSIS NONAUTO W/SCOPE: CPT

## 2018-11-26 PROCEDURE — 99900035 HC TECH TIME PER 15 MIN (STAT)

## 2018-11-26 PROCEDURE — 85610 PROTHROMBIN TIME: CPT

## 2018-11-26 PROCEDURE — 36600 WITHDRAWAL OF ARTERIAL BLOOD: CPT

## 2018-11-26 PROCEDURE — 85025 COMPLETE CBC W/AUTO DIFF WBC: CPT

## 2018-11-26 PROCEDURE — 96376 TX/PRO/DX INJ SAME DRUG ADON: CPT

## 2018-11-26 PROCEDURE — 83605 ASSAY OF LACTIC ACID: CPT

## 2018-11-26 RX ORDER — SUCCINYLCHOLINE CHLORIDE 20 MG/ML
100 INJECTION INTRAMUSCULAR; INTRAVENOUS
Status: DISCONTINUED | OUTPATIENT
Start: 2018-11-26 | End: 2018-11-26

## 2018-11-26 RX ORDER — FUROSEMIDE 10 MG/ML
60 INJECTION INTRAMUSCULAR; INTRAVENOUS ONCE
Status: COMPLETED | OUTPATIENT
Start: 2018-11-26 | End: 2018-11-26

## 2018-11-26 RX ORDER — SODIUM BICARBONATE 650 MG/1
650 TABLET ORAL ONCE
Status: COMPLETED | OUTPATIENT
Start: 2018-11-26 | End: 2018-11-26

## 2018-11-26 RX ORDER — FUROSEMIDE 10 MG/ML
60 INJECTION INTRAMUSCULAR; INTRAVENOUS
Status: COMPLETED | OUTPATIENT
Start: 2018-11-26 | End: 2018-11-26

## 2018-11-26 RX ORDER — METOPROLOL TARTRATE 1 MG/ML
5 INJECTION, SOLUTION INTRAVENOUS
Status: COMPLETED | OUTPATIENT
Start: 2018-11-26 | End: 2018-11-26

## 2018-11-26 RX ORDER — HEPARIN SODIUM,PORCINE/D5W 25000/250
12 INTRAVENOUS SOLUTION INTRAVENOUS CONTINUOUS
Status: DISCONTINUED | OUTPATIENT
Start: 2018-11-26 | End: 2018-11-29

## 2018-11-26 RX ORDER — FAMOTIDINE 20 MG/50ML
20 INJECTION, SOLUTION INTRAVENOUS DAILY
Status: DISCONTINUED | OUTPATIENT
Start: 2018-11-27 | End: 2018-11-27

## 2018-11-26 RX ORDER — ATORVASTATIN CALCIUM 40 MG/1
80 TABLET, FILM COATED ORAL DAILY
Status: DISCONTINUED | OUTPATIENT
Start: 2018-11-26 | End: 2018-12-04 | Stop reason: HOSPADM

## 2018-11-26 RX ORDER — ASPIRIN 325 MG
325 TABLET ORAL
Status: COMPLETED | OUTPATIENT
Start: 2018-11-26 | End: 2018-11-26

## 2018-11-26 RX ORDER — ETOMIDATE 2 MG/ML
20 INJECTION INTRAVENOUS
Status: DISCONTINUED | OUTPATIENT
Start: 2018-11-26 | End: 2018-11-26

## 2018-11-26 RX ADMIN — FUROSEMIDE 60 MG: 10 INJECTION, SOLUTION INTRAMUSCULAR; INTRAVENOUS at 08:11

## 2018-11-26 RX ADMIN — ATORVASTATIN CALCIUM 80 MG: 40 TABLET, FILM COATED ORAL at 07:11

## 2018-11-26 RX ADMIN — NITROGLYCERIN 1 INCH: 20 OINTMENT TOPICAL at 06:11

## 2018-11-26 RX ADMIN — FUROSEMIDE 60 MG: 10 INJECTION, SOLUTION INTRAMUSCULAR; INTRAVENOUS at 06:11

## 2018-11-26 RX ADMIN — SODIUM BICARBONATE TAB 650 MG 650 MG: 650 TAB at 07:11

## 2018-11-26 RX ADMIN — HEPARIN SODIUM 12 UNITS/KG/HR: 10000 INJECTION, SOLUTION INTRAVENOUS at 07:11

## 2018-11-26 RX ADMIN — ASPIRIN 325 MG ORAL TABLET 325 MG: 325 PILL ORAL at 06:11

## 2018-11-26 RX ADMIN — METOPROLOL TARTRATE 5 MG: 1 INJECTION, SOLUTION INTRAVENOUS at 06:11

## 2018-11-26 NOTE — ED PROVIDER NOTES
SCRIBE #1 NOTE: I, Perla Tobar, am scribing for, and in the presence of, Lizzette Holder MD. I have scribed the entire note.      History      Chief Complaint   Patient presents with    Shortness of Breath       Review of patient's allergies indicates:   Allergen Reactions    Codeine Swelling    Darvon [propoxyphene] Swelling        HPI   HPI    11/26/2018, 5:30 PM   History obtained from the patient and EMS      History of Present Illness: Avril Monzon is a 73 y.o. female patient with a PMHx of DM, Stroke, Seizures, HTN, Hyperlipidemia, CKD stage IV, chronic Anemia, former smoker who presents to the Emergency Department for SOB which onset gradually yesterday. Symptoms are worsening and moderate in severity. No mitigating or exacerbating factors reported. Associated sxs include non-productive cough, subjective fever. EMS reports pt was evaluated at [facility] and her O2 saturation was 80% on RA. Pt was given one duoneb which improved her O2 saturation to 94%. Pt on 4L NC O2 upon arrival. Patient denies any CP, n/v, BLE edema/pain, extremity weakness/numbness, dizziness, recent travel/long car rides, and all other sxs at this time. No further complaints or concerns at this time.       Arrival mode: EMS    PCP: Rose Parks MD       Past Medical History:  Past Medical History:   Diagnosis Date    Acute hypoxemic respiratory failure 11/26/2018    Anemia     Arthritis     back, hand, knees    Back pain     Cataract     CKD stage G4/A3, GFR 15 - 29 and albumin creatinine ratio >300 mg/g     GFR 40% Jan 2014 and 33% ub 3/2014 ([facility])    Diabetic retinopathy     DM (diabetes mellitus) type II controlled, neurological manifestation     GERD (gastroesophageal reflux disease)     Glaucoma     Hyperlipidemia     Hypertension     Peripheral neuropathy     Polyneuropathy     Proteinuria     >6 mo     Seizures     [facility] 1/2014 -dick CT NRI showed small vessel    Stroke 2012,2014    Tobacco dependence      Type 2 diabetes with peripheral circulatory disorder, controlled        Past Surgical History:  Past Surgical History:   Procedure Laterality Date    BREAST MASS EXCISION      ,benign, age 15.    HYSTERECTOMY  1982    wrist cyst Right 1980s    dorsal wrist cyst         Family History:  Family History   Problem Relation Age of Onset    Diabetes Mother     Hyperlipidemia Mother     Hypertension Mother     Heart disease Mother         MI    Muscular dystrophy Son     Cancer Maternal Grandmother         colon       Social History:  Social History     Tobacco Use    Smoking status: Former Smoker     Packs/day: 1.50     Years: 30.00     Pack years: 45.00     Types: Cigarettes     Last attempt to quit: 1990     Years since quittin.8    Smokeless tobacco: Former User   Substance and Sexual Activity    Alcohol use: No     Alcohol/week: 0.0 oz    Drug use: No    Sexual activity: No       ROS   Review of Systems   Constitutional: Positive for fever (subjective). Negative for chills and diaphoresis.   HENT: Negative for congestion and sore throat.    Eyes: Negative for visual disturbance.   Respiratory: Positive for cough and shortness of breath.    Cardiovascular: Negative for chest pain, palpitations and leg swelling.   Gastrointestinal: Negative for nausea and vomiting.   Genitourinary: Negative for dysuria.   Musculoskeletal: Negative for back pain and myalgias.   Skin: Negative for rash.   Neurological: Negative for dizziness, weakness and numbness.   Hematological: Does not bruise/bleed easily.   All other systems reviewed and are negative.    Physical Exam      Initial Vitals [18 1738]   BP Pulse Resp Temp SpO2   (!) 160/88 104 (!) 23 98.6 °F (37 °C) (!) 82 %      MAP       --          Physical Exam  Nursing Notes and Vital Signs Reviewed.  Constitutional: Patient is in moderate distress. Well-developed and well-nourished. Ill-appearing.   Head: Atraumatic. Normocephalic.  Eyes: PERRL. EOM  intact. Conjunctivae are pale. No scleral icterus.  ENT: Mucous membranes are moist. Oropharynx is clear and symmetric.    Neck: Supple. Full ROM. No lymphadenopathy.  Cardiovascular: tachycardic. Regular rhythm. No murmurs, rubs, or gallops. Distal pulses are 2+ and symmetric.  Pulmonary/Chest: Tachypneic. Crackles to the bilateral bases.   Abdominal: Soft and non-distended.  There is no tenderness.  No rebound, guarding, or rigidity.   Musculoskeletal: Moves all extremities. No obvious deformities. No edema. No calf tenderness.  Skin: Warm and dry. Pale.   Neurological:  Alert, awake, and appropriate.  Normal speech.  No acute focal neurological deficits are appreciated.  Psychiatric: Normal affect. Good eye contact. Appropriate in content.    ED Course    Critical Care  Date/Time: 11/26/2018 7:06 PM  Performed by: Lizzette Holder MD  Authorized by: Lizzette Holder MD   Direct patient critical care time: 20 minutes  Additional history critical care time: 10 minutes  Ordering / reviewing critical care time: 10 minutes  Documentation critical care time: 5 minutes  Consulting other physicians critical care time: 5 minutes  Consult with family critical care time: 10 minutes  Total critical care time (exclusive of procedural time) : 60 minutes  Critical care time was exclusive of separately billable procedures and treating other patients and teaching time.  Critical care was necessary to treat or prevent imminent or life-threatening deterioration of the following conditions: cardiac failure and respiratory failure.  Critical care was time spent personally by me on the following activities: blood draw for specimens, development of treatment plan with patient or surrogate, discussions with consultants, interpretation of cardiac output measurements, evaluation of patient's response to treatment, examination of patient, obtaining history from patient or surrogate, ordering and performing treatments and  "interventions, ordering and review of laboratory studies, ordering and review of radiographic studies, pulse oximetry, re-evaluation of patient's condition and review of old charts.        ED Vital Signs:  Vitals:    11/26/18 1738 11/26/18 1755 11/26/18 1811 11/26/18 1822   BP: (!) 160/88  (!) 181/79    Pulse: 104 100 98 96   Resp: (!) 23  (!) 30 (!) 30   Temp: 98.6 °F (37 °C)      TempSrc: Oral      SpO2: (!) 82%  100% 100%   Weight: 68.8 kg (151 lb 11.2 oz)      Height: 5' 5" (1.651 m)       11/26/18 1842 11/26/18 1845 11/26/18 1902 11/26/18 1922   BP: (!) 149/69   (!) 143/69   Pulse: 80   81   Resp: (!) 39 (!) 38  (!) 36   Temp:       TempSrc:       SpO2: 97%   (!) 93%   Weight:   78 kg (171 lb 14.4 oz)    Height:           Abnormal Lab Results:  Labs Reviewed   CBC W/ AUTO DIFFERENTIAL - Abnormal; Notable for the following components:       Result Value    RBC 3.32 (*)     Hemoglobin 10.1 (*)     Hematocrit 31.3 (*)     Gran # (ANC) 9.4 (*)     Lymph # 0.5 (*)     Gran% 90.6 (*)     Lymph% 4.7 (*)     All other components within normal limits   COMPREHENSIVE METABOLIC PANEL - Abnormal; Notable for the following components:    CO2 17 (*)     Glucose 336 (*)     BUN, Bld 69 (*)     Creatinine 4.1 (*)     Albumin 3.4 (*)     eGFR if  12 (*)     eGFR if non  10 (*)     All other components within normal limits   TROPONIN I - Abnormal; Notable for the following components:    Troponin I 4.417 (*)     All other components within normal limits   B-TYPE NATRIURETIC PEPTIDE - Abnormal; Notable for the following components:    BNP 1,613 (*)     All other components within normal limits   LACTIC ACID, PLASMA - Abnormal; Notable for the following components:    Lactate (Lactic Acid) 2.7 (*)     All other components within normal limits   PROCALCITONIN - Abnormal; Notable for the following components:    Procalcitonin 0.66 (*)     All other components within normal limits   URINALYSIS, REFLEX " TO URINE CULTURE - Abnormal; Notable for the following components:    Protein, UA 2+ (*)     Glucose, UA 1+ (*)     Occult Blood UA 1+ (*)     All other components within normal limits    Narrative:     Preferred Collection Type->Urine, Catheterized   URINALYSIS MICROSCOPIC - Abnormal; Notable for the following components:    Bacteria, UA Few (*)     All other components within normal limits    Narrative:     Preferred Collection Type->Urine, Catheterized   ISTAT PROCEDURE - Abnormal; Notable for the following components:    POC PH 7.324 (*)     POC PCO2 31.5 (*)     POC PO2 144 (*)     POC HCO3 16.4 (*)     All other components within normal limits   CULTURE, BLOOD   CULTURE, BLOOD   PROTIME-INR   APTT   LACTIC ACID, PLASMA   TYPE & SCREEN        All Lab Results:  Results for orders placed or performed during the hospital encounter of 11/26/18   CBC auto differential   Result Value Ref Range    WBC 10.37 3.90 - 12.70 K/uL    RBC 3.32 (L) 4.00 - 5.40 M/uL    Hemoglobin 10.1 (L) 12.0 - 16.0 g/dL    Hematocrit 31.3 (L) 37.0 - 48.5 %    MCV 94 82 - 98 fL    MCH 30.4 27.0 - 31.0 pg    MCHC 32.3 32.0 - 36.0 g/dL    RDW 13.2 11.5 - 14.5 %    Platelets 223 150 - 350 K/uL    MPV 10.1 9.2 - 12.9 fL    Gran # (ANC) 9.4 (H) 1.8 - 7.7 K/uL    Lymph # 0.5 (L) 1.0 - 4.8 K/uL    Mono # 0.5 0.3 - 1.0 K/uL    Eos # 0.0 0.0 - 0.5 K/uL    Baso # 0.00 0.00 - 0.20 K/uL    Gran% 90.6 (H) 38.0 - 73.0 %    Lymph% 4.7 (L) 18.0 - 48.0 %    Mono% 4.7 4.0 - 15.0 %    Eosinophil% 0.0 0.0 - 8.0 %    Basophil% 0.0 0.0 - 1.9 %    Differential Method Automated    Comprehensive metabolic panel   Result Value Ref Range    Sodium 141 136 - 145 mmol/L    Potassium 4.1 3.5 - 5.1 mmol/L    Chloride 110 95 - 110 mmol/L    CO2 17 (L) 23 - 29 mmol/L    Glucose 336 (H) 70 - 110 mg/dL    BUN, Bld 69 (H) 8 - 23 mg/dL    Creatinine 4.1 (H) 0.5 - 1.4 mg/dL    Calcium 9.2 8.7 - 10.5 mg/dL    Total Protein 7.2 6.0 - 8.4 g/dL    Albumin 3.4 (L) 3.5 - 5.2 g/dL     Total Bilirubin 0.8 0.1 - 1.0 mg/dL    Alkaline Phosphatase 84 55 - 135 U/L    AST 25 10 - 40 U/L    ALT 13 10 - 44 U/L    Anion Gap 14 8 - 16 mmol/L    eGFR if African American 12 (A) >60 mL/min/1.73 m^2    eGFR if non African American 10 (A) >60 mL/min/1.73 m^2   Troponin I #1   Result Value Ref Range    Troponin I 4.417 (H) 0.000 - 0.026 ng/mL   B-Type natriuretic peptide (BNP)   Result Value Ref Range    BNP 1,613 (H) 0 - 99 pg/mL   Lactic acid, plasma   Result Value Ref Range    Lactate (Lactic Acid) 2.7 (H) 0.5 - 2.2 mmol/L   Procalcitonin   Result Value Ref Range    Procalcitonin 0.66 (H) <0.25 ng/mL   Protime-INR   Result Value Ref Range    Prothrombin Time 10.1 9.0 - 12.5 sec    INR 0.9 0.8 - 1.2   APTT   Result Value Ref Range    aPTT 23.9 21.0 - 32.0 sec   Urinalysis, Reflex to Urine Culture Urine, Catheterized   Result Value Ref Range    Specimen UA Urine, Catheterized     Color, UA Yellow Yellow, Straw, Hanane    Appearance, UA Clear Clear    pH, UA 6.0 5.0 - 8.0    Specific Gravity, UA 1.020 1.005 - 1.030    Protein, UA 2+ (A) Negative    Glucose, UA 1+ (A) Negative    Ketones, UA Negative Negative    Bilirubin (UA) Negative Negative    Occult Blood UA 1+ (A) Negative    Nitrite, UA Negative Negative    Urobilinogen, UA Negative <2.0 EU/dL    Leukocytes, UA Negative Negative   Urinalysis Microscopic   Result Value Ref Range    RBC, UA 1 0 - 4 /hpf    WBC, UA 1 0 - 5 /hpf    Bacteria, UA Few (A) None-Occ /hpf    Hyaline Casts, UA 0 0-1/lpf /lpf    Amorphous, UA Few None-Moderate    Microscopic Comment SEE COMMENT    ISTAT PROCEDURE   Result Value Ref Range    POC PH 7.324 (L) 7.35 - 7.45    POC PCO2 31.5 (L) 35 - 45 mmHg    POC PO2 144 (H) 80 - 100 mmHg    POC HCO3 16.4 (L) 24 - 28 mmol/L    POC BE -10 -2 to 2 mmol/L    POC SATURATED O2 99 95 - 100 %    Rate 10     Sample ARTERIAL     Site LR     Allens Test Pass     DelSys CPAP/BiPAP     Mode BiPAP     FiO2 100     IP 12     EP 6        Imaging  Results:  Imaging Results          X-Ray Chest AP Portable (Final result)  Result time 11/26/18 17:53:35    Final result by Sebastien Pettit III, MD (11/26/18 17:53:35)                 Impression:      Probable bilateral perihilar edema/congestion although cannot exclude pneumonia.  Follow-up recommended      Electronically signed by: Sebastien Pettit MD  Date:    11/26/2018  Time:    17:53             Narrative:    EXAMINATION:  XR CHEST AP PORTABLE    CLINICAL HISTORY:  Shortness of breath    COMPARISON:  May 2017    FINDINGS:  The heart size is normal.  Extensive bilateral infiltrates greatest in the perihilar Artem greater on the left.  This may be due to pulmonary edema, asymmetric however bilateral pneumonia cannot be excluded.  Follow-up recommended.                               The EKG was ordered, reviewed, and independently interpreted by the ED provider.  Interpretation time: 1755  Rate: 100 BPM  Rhythm: normal sinus rhythm  Interpretation: Possible LAE. ST depression in inferior leads. No STEMI.         The Emergency Provider reviewed the vital signs and test results, which are outlined above.    ED Discussion     5:46 PM: Pt placed on BiPAP at this time.     6:32 PM: Discussed pt's case with Dr. Mejia (Cardiology) who agrees with current plan. He recommends a heparin drip if pt's troponin is elevated.    7:00 PM: Re-evaluated pt. Pt reports she has not had any CP. Pt is feeling much better on BiPAP. I have discussed test results, shared treatment plan, and the need for admission with patient and family at bedside. Pt and family express understanding at this time and agree with all information. All questions answered. Pt and family have no further questions or concerns at this time. Pt is ready for admit.    7:05 PM: Despite lactic being 2.7, will not give IVF secondary to pt being in acute heart failure, Blood pressure stable.     7:07 PM: Discussed pt's case with Dr. Mejia (Cardiology) who  recommends a heparin drip, ACS protocol, and a statin. He recommends holding on a beta blocker at this time, he states if pt is already on a beta blocker it is okay to continue.     7:35 PM: Discussed case with Dr. Pena ([facility] Medicine). Dr. Pena agrees with current care and management of pt and accepts admission.   Admitting Service: Hospital medicine   Admitting Physician: Dr. Pena  Admit to: ICU    ED Medication(s):  Medications   heparin 25,000 units in dextrose 5% 250 mL (100 units/mL) infusion LOW INTENSITY nomogram - [facility] (12 Units/kg/hr × 65.4 kg (Adjusted) Intravenous New Bag 11/26/18 1945)   heparin 25,000 units in dextrose 5% (100 units/ml) IV bolus from bag - ADDITIONAL PRN BOLUS - 60 units/kg (max bolus 4000 units) (not administered)   heparin 25,000 units in dextrose 5% (100 units/ml) IV bolus from bag - ADDITIONAL PRN BOLUS - 30 units/kg (max bolus 4000 units) (not administered)   atorvastatin tablet 80 mg (80 mg Oral Given 11/26/18 1939)   famotidine IVPB 20 mg (not administered)   furosemide injection 60 mg (60 mg Intravenous Given 11/26/18 1808)   nitroGLYCERIN 2% TD oint ointment 1 inch (1 inch Topical (Top) Given 11/26/18 1808)   aspirin tablet 325 mg (325 mg Oral Given 11/26/18 1819)   metoprolol injection 5 mg (5 mg Intravenous Given 11/26/18 1819)   heparin 25,000 units in dextrose 5% (100 units/ml) IV bolus from bag INITIAL BOLUS (max bolus 4000 units) (3,924 Units Intravenous Bolus from Bag 11/26/18 1942)   furosemide injection 60 mg (60 mg Intravenous Given 11/26/18 2013)   sodium bicarbonate tablet 650 mg (650 mg Oral Given 11/26/18 1951)          Medical Decision Making    Medical Decision Making:   Clinical Tests:   Lab Tests: Ordered and Reviewed  Radiological Study: Ordered and Reviewed  Medical Tests: Reviewed and Ordered           Scribe Attestation:   Scribe #1: I performed the above scribed service and the documentation accurately describes the services I performed. I attest to  the accuracy of the note.    Attending:   Physician Attestation Statement for Scribe #1: I, Lizzette Holder MD, personally performed the services described in this documentation, as scribed by Perla Tobar, in my presence, and it is both accurate and complete.          Clinical Impression       ICD-10-CM ICD-9-CM   1. Acute hypoxemic respiratory failure J96.01 518.81   2. Shortness of breath R06.02 786.05   3. ACS (acute coronary syndrome) I24.9 411.1   4. Heart failure I50.9 428.9   5. Acute pulmonary edema J81.0 518.4   6. NSTEMI (non-ST elevated myocardial infarction) I21.4 410.70   7. Acute kidney injury superimposed on chronic kidney disease N17.9 866.00    N18.9 585.9   8. Chronic anemia D64.9 285.9   9. Type 2 diabetes mellitus with hyperglycemia, without long-term current use of insulin E11.65 250.00     790.29       Disposition:   Disposition: Admitted  Condition: Serious         Lizzette Holder MD  11/26/18 2014

## 2018-11-27 PROBLEM — N18.4 CKD (CHRONIC KIDNEY DISEASE), STAGE IV: Status: ACTIVE | Noted: 2018-11-27

## 2018-11-27 PROBLEM — I10 ESSENTIAL HYPERTENSION: Chronic | Status: ACTIVE | Noted: 2018-11-27

## 2018-11-27 PROBLEM — I21.4 NSTEMI (NON-ST ELEVATED MYOCARDIAL INFARCTION): Status: ACTIVE | Noted: 2018-11-27

## 2018-11-27 PROBLEM — N18.4 ANEMIA IN STAGE 4 CHRONIC KIDNEY DISEASE: Chronic | Status: ACTIVE | Noted: 2017-05-18

## 2018-11-27 PROBLEM — E78.5 HYPERLIPIDEMIA: Chronic | Status: ACTIVE | Noted: 2018-11-27

## 2018-11-27 PROBLEM — D63.1 ANEMIA IN STAGE 4 CHRONIC KIDNEY DISEASE: Chronic | Status: ACTIVE | Noted: 2017-05-18

## 2018-11-27 LAB
ABO + RH BLD: NORMAL
ALLENS TEST: ABNORMAL
ANION GAP SERPL CALC-SCNC: 13 MMOL/L
APTT BLDCRRT: 29.6 SEC
APTT BLDCRRT: 37.6 SEC
APTT BLDCRRT: 72.3 SEC
APTT BLDCRRT: 83.2 SEC
BASOPHILS # BLD AUTO: 0.01 K/UL
BASOPHILS NFR BLD: 0.1 %
BLD GP AB SCN CELLS X3 SERPL QL: NORMAL
BLD PROD TYP BPU: NORMAL
BLOOD UNIT EXPIRATION DATE: NORMAL
BLOOD UNIT TYPE CODE: 6200
BLOOD UNIT TYPE: NORMAL
BUN SERPL-MCNC: 68 MG/DL
CALCIUM SERPL-MCNC: 8.6 MG/DL
CHLORIDE SERPL-SCNC: 109 MMOL/L
CHOLEST SERPL-MCNC: 173 MG/DL
CHOLEST/HDLC SERPL: 3 {RATIO}
CO2 SERPL-SCNC: 21 MMOL/L
CODING SYSTEM: NORMAL
CREAT SERPL-MCNC: 4 MG/DL
CREAT UR-MCNC: 80.7 MG/DL
DELSYS: ABNORMAL
DIASTOLIC DYSFUNCTION: YES
DIFFERENTIAL METHOD: ABNORMAL
DISPENSE STATUS: NORMAL
EOSINOPHIL # BLD AUTO: 0 K/UL
EOSINOPHIL NFR BLD: 0.1 %
EP: 6
ERYTHROCYTE [DISTWIDTH] IN BLOOD BY AUTOMATED COUNT: 13.2 %
ERYTHROCYTE [SEDIMENTATION RATE] IN BLOOD BY WESTERGREN METHOD: 10 MM/H
EST. GFR  (AFRICAN AMERICAN): 12 ML/MIN/1.73 M^2
EST. GFR  (NON AFRICAN AMERICAN): 11 ML/MIN/1.73 M^2
ESTIMATED AVG GLUCOSE: 146 MG/DL
ESTIMATED PA SYSTOLIC PRESSURE: 69.15
FIO2: 60
GLUCOSE SERPL-MCNC: 241 MG/DL
HBA1C MFR BLD HPLC: 6.7 %
HCO3 UR-SCNC: 20.3 MMOL/L (ref 24–28)
HCT VFR BLD AUTO: 25.3 %
HDLC SERPL-MCNC: 58 MG/DL
HDLC SERPL: 33.5 %
HGB BLD-MCNC: 8.2 G/DL
IP: 12
LDLC SERPL CALC-MCNC: 97.6 MG/DL
LYMPHOCYTES # BLD AUTO: 0.7 K/UL
LYMPHOCYTES NFR BLD: 7.4 %
MCH RBC QN AUTO: 30.7 PG
MCHC RBC AUTO-ENTMCNC: 32.4 G/DL
MCV RBC AUTO: 95 FL
MITRAL VALVE REGURGITATION: ABNORMAL
MODE: ABNORMAL
MONOCYTES # BLD AUTO: 0.5 K/UL
MONOCYTES NFR BLD: 5.5 %
NEUTROPHILS # BLD AUTO: 8.4 K/UL
NEUTROPHILS NFR BLD: 86.9 %
NONHDLC SERPL-MCNC: 115 MG/DL
NUM UNITS TRANS PACKED RBC: NORMAL
PCO2 BLDA: 35.3 MMHG (ref 35–45)
PH SMN: 7.37 [PH] (ref 7.35–7.45)
PLATELET # BLD AUTO: 186 K/UL
PMV BLD AUTO: 10.6 FL
PO2 BLDA: 154 MMHG (ref 80–100)
POC BE: -5 MMOL/L
POC SATURATED O2: 99 % (ref 95–100)
POCT GLUCOSE: 183 MG/DL (ref 70–110)
POCT GLUCOSE: 255 MG/DL (ref 70–110)
POCT GLUCOSE: 260 MG/DL (ref 70–110)
POCT GLUCOSE: 291 MG/DL (ref 70–110)
POTASSIUM SERPL-SCNC: 4.1 MMOL/L
PROT UR-MCNC: 92 MG/DL
PROT/CREAT UR: 1.14 MG/G{CREAT}
RBC # BLD AUTO: 2.67 M/UL
RETIRED EF AND QEF - SEE NOTES: 45 (ref 55–65)
SAMPLE: ABNORMAL
SITE: ABNORMAL
SODIUM SERPL-SCNC: 143 MMOL/L
TRICUSPID VALVE REGURGITATION: ABNORMAL
TRIGL SERPL-MCNC: 87 MG/DL
TROPONIN I SERPL DL<=0.01 NG/ML-MCNC: 11.16 NG/ML
TROPONIN I SERPL DL<=0.01 NG/ML-MCNC: 15.87 NG/ML
TROPONIN I SERPL DL<=0.01 NG/ML-MCNC: 16.31 NG/ML
WBC # BLD AUTO: 9.61 K/UL

## 2018-11-27 PROCEDURE — 84484 ASSAY OF TROPONIN QUANT: CPT

## 2018-11-27 PROCEDURE — 84156 ASSAY OF PROTEIN URINE: CPT

## 2018-11-27 PROCEDURE — 25000003 PHARM REV CODE 250: Performed by: NURSE PRACTITIONER

## 2018-11-27 PROCEDURE — 63600175 PHARM REV CODE 636 W HCPCS: Performed by: NURSE PRACTITIONER

## 2018-11-27 PROCEDURE — 27100171 HC OXYGEN HIGH FLOW UP TO 24 HOURS

## 2018-11-27 PROCEDURE — 99284 EMERGENCY DEPT VISIT MOD MDM: CPT | Mod: ,,, | Performed by: INTERNAL MEDICINE

## 2018-11-27 PROCEDURE — 36415 COLL VENOUS BLD VENIPUNCTURE: CPT

## 2018-11-27 PROCEDURE — 86920 COMPATIBILITY TEST SPIN: CPT

## 2018-11-27 PROCEDURE — 83036 HEMOGLOBIN GLYCOSYLATED A1C: CPT

## 2018-11-27 PROCEDURE — 80061 LIPID PANEL: CPT

## 2018-11-27 PROCEDURE — 36600 WITHDRAWAL OF ARTERIAL BLOOD: CPT

## 2018-11-27 PROCEDURE — 93306 TTE W/DOPPLER COMPLETE: CPT | Mod: 26,,, | Performed by: INTERNAL MEDICINE

## 2018-11-27 PROCEDURE — 93010 ELECTROCARDIOGRAM REPORT: CPT | Mod: ,,, | Performed by: INTERNAL MEDICINE

## 2018-11-27 PROCEDURE — 85730 THROMBOPLASTIN TIME PARTIAL: CPT | Mod: 91

## 2018-11-27 PROCEDURE — 86850 RBC ANTIBODY SCREEN: CPT

## 2018-11-27 PROCEDURE — 84484 ASSAY OF TROPONIN QUANT: CPT | Mod: 91

## 2018-11-27 PROCEDURE — 80048 BASIC METABOLIC PNL TOTAL CA: CPT

## 2018-11-27 PROCEDURE — P9016 RBC LEUKOCYTES REDUCED: HCPCS

## 2018-11-27 PROCEDURE — 99291 CRITICAL CARE FIRST HOUR: CPT | Mod: ,,, | Performed by: INTERNAL MEDICINE

## 2018-11-27 PROCEDURE — 27100092 HC HIGH FLOW DELIVERY CANNULA

## 2018-11-27 PROCEDURE — 85025 COMPLETE CBC W/AUTO DIFF WBC: CPT

## 2018-11-27 PROCEDURE — 25000003 PHARM REV CODE 250: Performed by: EMERGENCY MEDICINE

## 2018-11-27 PROCEDURE — 99223 1ST HOSP IP/OBS HIGH 75: CPT | Mod: ,,, | Performed by: INTERNAL MEDICINE

## 2018-11-27 PROCEDURE — 85730 THROMBOPLASTIN TIME PARTIAL: CPT

## 2018-11-27 PROCEDURE — 20000000 HC ICU ROOM

## 2018-11-27 PROCEDURE — 25000003 PHARM REV CODE 250: Performed by: PHYSICIAN ASSISTANT

## 2018-11-27 PROCEDURE — 99900035 HC TECH TIME PER 15 MIN (STAT)

## 2018-11-27 PROCEDURE — 82803 BLOOD GASES ANY COMBINATION: CPT

## 2018-11-27 PROCEDURE — 63600175 PHARM REV CODE 636 W HCPCS: Performed by: EMERGENCY MEDICINE

## 2018-11-27 PROCEDURE — 93306 TTE W/DOPPLER COMPLETE: CPT

## 2018-11-27 RX ORDER — FUROSEMIDE 10 MG/ML
80 INJECTION INTRAMUSCULAR; INTRAVENOUS 2 TIMES DAILY
Status: DISCONTINUED | OUTPATIENT
Start: 2018-11-27 | End: 2018-12-01

## 2018-11-27 RX ORDER — INSULIN ASPART 100 [IU]/ML
0-5 INJECTION, SOLUTION INTRAVENOUS; SUBCUTANEOUS EVERY 6 HOURS PRN
Status: DISCONTINUED | OUTPATIENT
Start: 2018-11-27 | End: 2018-11-28

## 2018-11-27 RX ORDER — ATORVASTATIN CALCIUM 40 MG/1
80 TABLET, FILM COATED ORAL DAILY
Status: DISCONTINUED | OUTPATIENT
Start: 2018-11-27 | End: 2018-11-27

## 2018-11-27 RX ORDER — METOPROLOL TARTRATE 25 MG/1
25 TABLET, FILM COATED ORAL
Status: DISCONTINUED | OUTPATIENT
Start: 2018-11-27 | End: 2018-11-28 | Stop reason: ALTCHOICE

## 2018-11-27 RX ORDER — ACETAMINOPHEN 325 MG/1
650 TABLET ORAL EVERY 6 HOURS PRN
Status: DISCONTINUED | OUTPATIENT
Start: 2018-11-27 | End: 2018-12-04 | Stop reason: HOSPADM

## 2018-11-27 RX ORDER — LISINOPRIL 20 MG/1
40 TABLET ORAL DAILY
Status: DISCONTINUED | OUTPATIENT
Start: 2018-11-27 | End: 2018-11-27

## 2018-11-27 RX ORDER — METOPROLOL TARTRATE 25 MG/1
25 TABLET, FILM COATED ORAL 2 TIMES DAILY
Status: DISCONTINUED | OUTPATIENT
Start: 2018-11-27 | End: 2018-12-04 | Stop reason: HOSPADM

## 2018-11-27 RX ORDER — ISOSORBIDE MONONITRATE 60 MG/1
60 TABLET, EXTENDED RELEASE ORAL DAILY
Status: DISCONTINUED | OUTPATIENT
Start: 2018-11-27 | End: 2018-12-04 | Stop reason: HOSPADM

## 2018-11-27 RX ORDER — GLUCAGON 1 MG
1 KIT INJECTION
Status: DISCONTINUED | OUTPATIENT
Start: 2018-11-27 | End: 2018-12-04 | Stop reason: HOSPADM

## 2018-11-27 RX ORDER — ASPIRIN 81 MG/1
81 TABLET ORAL DAILY
Status: DISCONTINUED | OUTPATIENT
Start: 2018-11-27 | End: 2018-12-04 | Stop reason: HOSPADM

## 2018-11-27 RX ORDER — HYDROCODONE BITARTRATE AND ACETAMINOPHEN 500; 5 MG/1; MG/1
TABLET ORAL
Status: DISCONTINUED | OUTPATIENT
Start: 2018-11-27 | End: 2018-12-04 | Stop reason: HOSPADM

## 2018-11-27 RX ORDER — HYDRALAZINE HYDROCHLORIDE 50 MG/1
50 TABLET, FILM COATED ORAL 2 TIMES DAILY
Status: DISCONTINUED | OUTPATIENT
Start: 2018-11-27 | End: 2018-11-28

## 2018-11-27 RX ORDER — SODIUM BICARBONATE 325 MG/1
325 TABLET ORAL 3 TIMES DAILY
Status: DISCONTINUED | OUTPATIENT
Start: 2018-11-27 | End: 2018-12-04 | Stop reason: HOSPADM

## 2018-11-27 RX ORDER — PANTOPRAZOLE SODIUM 40 MG/1
40 TABLET, DELAYED RELEASE ORAL DAILY
Status: DISCONTINUED | OUTPATIENT
Start: 2018-11-27 | End: 2018-12-04 | Stop reason: HOSPADM

## 2018-11-27 RX ORDER — FUROSEMIDE 10 MG/ML
60 INJECTION INTRAMUSCULAR; INTRAVENOUS 2 TIMES DAILY
Status: DISCONTINUED | OUTPATIENT
Start: 2018-11-27 | End: 2018-11-27

## 2018-11-27 RX ORDER — AMLODIPINE BESYLATE 10 MG/1
10 TABLET ORAL DAILY
Status: DISCONTINUED | OUTPATIENT
Start: 2018-11-27 | End: 2018-11-28

## 2018-11-27 RX ORDER — ONDANSETRON 2 MG/ML
4 INJECTION INTRAMUSCULAR; INTRAVENOUS EVERY 6 HOURS PRN
Status: DISCONTINUED | OUTPATIENT
Start: 2018-11-27 | End: 2018-12-04 | Stop reason: HOSPADM

## 2018-11-27 RX ORDER — NITROGLYCERIN 0.4 MG/1
0.4 TABLET SUBLINGUAL EVERY 5 MIN PRN
Status: DISCONTINUED | OUTPATIENT
Start: 2018-11-27 | End: 2018-12-04 | Stop reason: HOSPADM

## 2018-11-27 RX ADMIN — ISOSORBIDE MONONITRATE 60 MG: 60 TABLET, EXTENDED RELEASE ORAL at 02:11

## 2018-11-27 RX ADMIN — INSULIN ASPART 1 UNITS: 100 INJECTION, SOLUTION INTRAVENOUS; SUBCUTANEOUS at 12:11

## 2018-11-27 RX ADMIN — SODIUM BICARBONATE TAB 325 MG 325 MG: 325 TAB at 03:11

## 2018-11-27 RX ADMIN — AMLODIPINE BESYLATE 10 MG: 5 TABLET ORAL at 09:11

## 2018-11-27 RX ADMIN — METOPROLOL TARTRATE 25 MG: 25 TABLET ORAL at 09:11

## 2018-11-27 RX ADMIN — NITROGLYCERIN 1 INCH: 20 OINTMENT TOPICAL at 05:11

## 2018-11-27 RX ADMIN — HEPARIN SODIUM 9 UNITS/KG/HR: 10000 INJECTION, SOLUTION INTRAVENOUS at 08:11

## 2018-11-27 RX ADMIN — HYDRALAZINE HYDROCHLORIDE 50 MG: 50 TABLET, FILM COATED ORAL at 09:11

## 2018-11-27 RX ADMIN — SODIUM BICARBONATE TAB 325 MG 325 MG: 325 TAB at 09:11

## 2018-11-27 RX ADMIN — ATORVASTATIN CALCIUM 80 MG: 40 TABLET, FILM COATED ORAL at 09:11

## 2018-11-27 RX ADMIN — ACETAMINOPHEN 650 MG: 325 TABLET ORAL at 10:11

## 2018-11-27 RX ADMIN — ASPIRIN 81 MG: 81 TABLET, COATED ORAL at 09:11

## 2018-11-27 RX ADMIN — INSULIN ASPART 3 UNITS: 100 INJECTION, SOLUTION INTRAVENOUS; SUBCUTANEOUS at 08:11

## 2018-11-27 RX ADMIN — HEPARIN SODIUM 14 UNITS/KG/HR: 10000 INJECTION, SOLUTION INTRAVENOUS at 10:11

## 2018-11-27 RX ADMIN — FUROSEMIDE 80 MG: 10 INJECTION, SOLUTION INTRAMUSCULAR; INTRAVENOUS at 09:11

## 2018-11-27 RX ADMIN — PANTOPRAZOLE SODIUM 40 MG: 40 TABLET, DELAYED RELEASE ORAL at 09:11

## 2018-11-27 RX ADMIN — INSULIN ASPART 3 UNITS: 100 INJECTION, SOLUTION INTRAVENOUS; SUBCUTANEOUS at 05:11

## 2018-11-27 RX ADMIN — FUROSEMIDE 80 MG: 10 INJECTION, SOLUTION INTRAMUSCULAR; INTRAVENOUS at 07:11

## 2018-11-27 NOTE — PROGRESS NOTES
[facility] Hospital Medicine  Progress Note    Patient Name: Avril Monzon  MRN: [id number]  Patient Class: IP- Inpatient   Admission Date: 11/26/2018  Length of Stay: 1 days  Attending Physician: Zi Landon MD  Primary Care Provider: Rose Parks MD        Subjective:     Principal Problem:NSTEMI (non-ST elevated myocardial infarction)    HPI:  Ms. Monzon is a 72 yo female with a PMHx of HTN, HLD, DM II, CKD stage IV, anemia of CKD, and h/o CVA.  She presented to the ED with c/o SOB that has progressively worsened since yesterday.  Associated nonproductive cough and subjective fever.  No aggravating or alleviating factors.  Denies any CP, palpitations, diaphoresis, N/V, orthopnea, PND, edema, weight gain, ABD pain, diarrhea, dysuria, decreased UOP, hematuria, back pain, HA, AMS, lightheadedness, dizziness, syncope, weakness, numbness, or chills.  Per EMS, patient hypoxic on arrival to scene with O2 sat 80% on RA.  She was given Duoneb x 1 and placed on 4L NC with improved O2 sat of 94%.  Upon arrival to ED, patient's O2 sat 82% on 4L NC.  She was placed on BiPAP with increased O2 sat of 100%.  Work-up in ED resulted cr. 4.1, BUN 69, glucose 336, BNP 1613, troponin 4.417, lactic 2.7 > 1.5.  ABG with pH 7.324, pCO2 31.5, pO2 144, HCO3 16.4, BE -10 on BiPAP.  CXR showed pulmonary vascular congestion, pulmonary edema, and small bilateral pleural effusions.  EKG showed normal sinus rhythm with inferior ST depression, no previous to compare.  ASA 325mg, IV lopressor 5mg, and IV Lasix 120mg given per ED.  Case discussed with Cardiology in ED and IV heparin drip initiated.  Hospital Medicine called for admission.  Currently, patient appears comfortable in NAD.  Reports SOB improved on BiPAP.  Denies any CP.  No h/o CAD.  Denies having stress test in the past.  2D echo in 6/2018 showed LVEF of 60-65%, normal diastolic function, and no significant valvular abnormalities.      Hospital Course:  Pt  admitted to Inpatient status for NSTEMI with Troponin significantly elevated.  Cardiology consulted with Heparin infusion initiated.  EKG positive for ST depression.  Pt placed on BIPAP due to respiratory distress and later weaned to high flow oxygen.  Pulmonology consulted.  Chest xray verified pulmonary congestion with positive response to diuresis noted.  BNP elevated. Pt noted to renal failure with Creatinine of 4 and Nephrology consulted.  Baseline creatinine 2.5-3 noted.  Echo results showed EF 45% with diastolic dysfunction, PAP 69, +WMA, Pulm HTN, and mild to moderate MR.  LHC planned for tomorrow. H/H 8.2/25.3 with 1 unit of PRBCs transfused.  Procalcitonin of 0.66.  Blood culture results pending.  Pt denies acute distress at this time.     Interval History: pt stable on high flow oxygen.  Pt denies any acute distress at this time.  LHC planned for tomorrow per Cardiology.  PRBCs x 1 unit given.  Cardiology, Pulmonology, and Nephrology following.     Review of Systems   Constitutional: Positive for activity change and fatigue. Negative for appetite change and fever.   HENT: Negative for congestion, facial swelling, sore throat, trouble swallowing and voice change.    Eyes: Negative for redness and visual disturbance.   Respiratory: Positive for shortness of breath. Negative for apnea, cough, chest tightness and wheezing.    Cardiovascular: Positive for chest pain (improved). Negative for palpitations and leg swelling.   Gastrointestinal: Negative for abdominal distention, abdominal pain, blood in stool, constipation, diarrhea, nausea and vomiting.   Genitourinary: Negative for decreased urine volume, difficulty urinating, dysuria, flank pain, frequency, hematuria, pelvic pain and urgency.   Musculoskeletal: Negative for back pain, gait problem and joint swelling.   Skin: Negative for color change and rash.   Neurological: Positive for weakness. Negative for dizziness, syncope and headaches.   Hematological:  Does not bruise/bleed easily.   Psychiatric/Behavioral: Negative for agitation, behavioral problems and confusion. The patient is not nervous/anxious.      Objective:     Vital Signs (Most Recent):  Temp: 98.3 °F (36.8 °C) (11/27/18 0642)  Pulse: 81 (11/27/18 1701)  Resp: (!) 23 (11/27/18 1701)  BP: (!) 121/58 (11/27/18 1701)  SpO2: 95 % (11/27/18 1701) Vital Signs (24h Range):  Temp:  [97.7 °F (36.5 °C)-98.7 °F (37.1 °C)] 98.3 °F (36.8 °C)  Pulse:  [] 81  Resp:  [16-39] 23  SpO2:  [82 %-100 %] 95 %  BP: (108-181)/(54-88) 121/58     Weight: 78 kg (171 lb 14.4 oz)  Body mass index is 28.61 kg/m².    Intake/Output Summary (Last 24 hours) at 11/27/2018 1735  Last data filed at 11/27/2018 0638  Gross per 24 hour   Intake 19.62 ml   Output 1375 ml   Net -1355.38 ml      Physical Exam   Constitutional: She is oriented to person, place, and time. She appears well-developed and well-nourished. No distress.   HENT:   Head: Normocephalic and atraumatic.   On Vapotherm   Eyes: EOM are normal.   Neck: Neck supple.   Cardiovascular: Normal rate and regular rhythm.   Pulmonary/Chest: Tachypnea noted. She has rales.   Tachypnea   Abdominal: Soft. There is no tenderness.   Genitourinary:   Genitourinary Comments: Pfeiffer in place   Musculoskeletal: She exhibits edema.   Trace edema bilateral lower extremity   Neurological: She is alert and oriented to person, place, and time.   Skin: Skin is warm.   Psychiatric: She has a normal mood and affect. Her behavior is normal. Judgment and thought content normal.   Nursing note and vitals reviewed.      Significant Labs:   Blood Culture:   Recent Labs   Lab 11/26/18 1807   LABBLOO No Growth to date  No Growth to date     CBC:   Recent Labs   Lab 11/26/18 1807 11/27/18  0546   WBC 10.37 9.61   HGB 10.1* 8.2*   HCT 31.3* 25.3*    186     CMP:   Recent Labs   Lab 11/26/18  1807 11/27/18  0546    143   K 4.1 4.1    109   CO2 17* 21*   * 241*   BUN 69* 68*    CREATININE 4.1* 4.0*   CALCIUM 9.2 8.6*   PROT 7.2  --    ALBUMIN 3.4*  --    BILITOT 0.8  --    ALKPHOS 84  --    AST 25  --    ALT 13  --    ANIONGAP 14 13   EGFRNONAA 10* 11*     Troponin:   Recent Labs   Lab 11/26/18  2359 11/27/18  0546 11/27/18  1358   TROPONINI 15.866* 16.308* 11.162*       Significant Imaging:   Imaging Results          X-Ray Chest AP Portable (Final result)  Result time 11/26/18 17:53:35    Final result by Sebastien Pettit III, MD (11/26/18 17:53:35)                 Impression:      Probable bilateral perihilar edema/congestion although cannot exclude pneumonia.  Follow-up recommended      Electronically signed by: Sebastien Pettit MD  Date:    11/26/2018  Time:    17:53             Narrative:    EXAMINATION:  XR CHEST AP PORTABLE    CLINICAL HISTORY:  Shortness of breath    COMPARISON:  May 2017    FINDINGS:  The heart size is normal.  Extensive bilateral infiltrates greatest in the perihilar Artem greater on the left.  This may be due to pulmonary edema, asymmetric however bilateral pneumonia cannot be excluded.  Follow-up recommended.                              Assessment/Plan:      * NSTEMI (non-ST elevated myocardial infarction)    - Initial troponin 4.417, EKG with inferior ST depression (no previous to compare).  Chest pain free.  - Trend serial cardiac enzymes   - EKG showed NSR with ST wave abnormality  - 2D echo results reviewed   - FLP.  - Continue ASA and high-intensity statin.  Will add BB.  - Continue IV heparin drip per ACS protocol.  - Cardiology following.  - [facility] planned for am     GREGG on CKD, stage IV    - Likely cardiorenal syndrome.  - Diuresis continued  - strict I&O's.  - Avoid nephrotoxic agents.  Hold home ACEi.  - Nephrology following   - Cardiac Cath planned for tomorrow morning  - baseline creatinine 2.5-3     Hyperlipidemia    - Continue daily Lipitor 80mg.  - Check FLP.     Essential hypertension    - BP stable.  - Resume home hydralazine and  amlodipine.    -Continue BB and diuresis as above.  - ACEi on hold      Acute hypoxemic respiratory failure secondary to acute decompensated HF    - O2 sat stable on 60% FiO2 per BiPAP, wean as tolerated.  -BIPAP weaned to high flow oxygen   - IV lasix 120mg given in ED with ~1L diuresed.    - Continue aggressive diuresis with IV Lasix 80mg BID.  - Strict I&O's, daily weights,   - Na/fluid restriction.  -  2D echo results showed EF 45% with diastolic dysfunction, PAP 69, +WMA, and mild to moderate MR.   - Cardiology and Pulmonology following     Anemia in stage 4 chronic kidney disease    - H&H stable at baseline.    -1 unit of PRBCs transfused   -repeat CBC in am   -transfuse as needed.     Controlled type 2 diabetes mellitus with diabetic polyneuropathy, with long-term current use of insulin    - Accuchecks with moderate SSI.  - HbA1c 6.7  -Diabetic diet- NPO after MN for pending procedure       VTE Risk Mitigation (From admission, onward)        Ordered     IP VTE HIGH RISK PATIENT  Once      11/27/18 1342     Place sequential compression device  Until discontinued      11/27/18 1342     Place sequential compression device  Until discontinued      11/27/18 0032     heparin 25,000 units in dextrose 5% 250 mL (100 units/mL) infusion LOW INTENSITY nomogram - [facility]  Continuous      11/26/18 1905     heparin 25,000 units in dextrose 5% (100 units/ml) IV bolus from bag - ADDITIONAL PRN BOLUS - 60 units/kg (max bolus 4000 units)  As needed (PRN)      11/26/18 1905     heparin 25,000 units in dextrose 5% (100 units/ml) IV bolus from bag - ADDITIONAL PRN BOLUS - 30 units/kg (max bolus 4000 units)  As needed (PRN)      11/26/18 1905              Germania Devine NP  Department of Hospital Medicine   [facility]

## 2018-11-27 NOTE — HPI
"Ms. Monzon is a 73 year old female patient with a PMHx of HTN, hyperlipidemia, DM type II, former tobacco abuse, CKD, anemia, and CVA who presented to [facility] ED last night with a chief complaint of progressively worsening SOB over the past 2-3 days. Patient stated she began to feel like she was coming down with a "cold or sinus infection" on Friday and developed a persistent cough when lying down. Associated symptoms included fatigue and subjective fever. Patient denied any associated ministerio chest pain, palpitations, weight gain, nausea, vomiting, diaphoresis, near syncope, or syncope. Initial workup in ED revealed creatinine of 4.1, BNP of 1613, troponin of 4.4, and lactic acid of 2.7. CXR showed evidence of fluid overload. EKG showed inferior ST depression and patient subsequently admitted for further evaluation and treatment. Cardiology consulted to assist with management. Patient seen and examined today while in ED. States SOB is improving. Remains chest pain free. Denies any prior cardiac history-no known CAD or history of MI. Patient reports compliance with her medications. Chart reviewed. Troponin 4417>15.866>16.308. Repeat EKG this AM showed diffuse ST depression in anterior leads. 2D echo showed EF of 45-50%, +WMA, pulmonary HTN, and mild to moderate MR.  "

## 2018-11-27 NOTE — ASSESSMENT & PLAN NOTE
- Likely cardiorenal syndrome.  - Diurese as above, strict I&O's.  - Avoid nephrotoxic agents.  Hold home ACEi.  - Nephrology consult in AM.  Patient needs cardiac cath, ? will need RRT.

## 2018-11-27 NOTE — SUBJECTIVE & OBJECTIVE
Interval History: pt stable on high flow oxygen.  Pt denies any acute distress at this time.  LHC planned for tomorrow per Cardiology.  PRBCs x 1 unit given.  Cardiology, Pulmonology, and Nephrology following.     Review of Systems   Constitutional: Positive for activity change and fatigue. Negative for appetite change and fever.   HENT: Negative for congestion, facial swelling, sore throat, trouble swallowing and voice change.    Eyes: Negative for redness and visual disturbance.   Respiratory: Positive for shortness of breath. Negative for apnea, cough, chest tightness and wheezing.    Cardiovascular: Positive for chest pain (improved). Negative for palpitations and leg swelling.   Gastrointestinal: Negative for abdominal distention, abdominal pain, blood in stool, constipation, diarrhea, nausea and vomiting.   Genitourinary: Negative for decreased urine volume, difficulty urinating, dysuria, flank pain, frequency, hematuria, pelvic pain and urgency.   Musculoskeletal: Negative for back pain, gait problem and joint swelling.   Skin: Negative for color change and rash.   Neurological: Positive for weakness. Negative for dizziness, syncope and headaches.   Hematological: Does not bruise/bleed easily.   Psychiatric/Behavioral: Negative for agitation, behavioral problems and confusion. The patient is not nervous/anxious.      Objective:     Vital Signs (Most Recent):  Temp: 98.3 °F (36.8 °C) (11/27/18 0642)  Pulse: 81 (11/27/18 1701)  Resp: (!) 23 (11/27/18 1701)  BP: (!) 121/58 (11/27/18 1701)  SpO2: 95 % (11/27/18 1701) Vital Signs (24h Range):  Temp:  [97.7 °F (36.5 °C)-98.7 °F (37.1 °C)] 98.3 °F (36.8 °C)  Pulse:  [] 81  Resp:  [16-39] 23  SpO2:  [82 %-100 %] 95 %  BP: (108-181)/(54-88) 121/58     Weight: 78 kg (171 lb 14.4 oz)  Body mass index is 28.61 kg/m².    Intake/Output Summary (Last 24 hours) at 11/27/2018 5885  Last data filed at 11/27/2018 0638  Gross per 24 hour   Intake 19.62 ml   Output 1375 ml    Net -1355.38 ml      Physical Exam   Constitutional: She is oriented to person, place, and time. She appears well-developed and well-nourished. No distress.   HENT:   Head: Normocephalic and atraumatic.   On Vapotherm   Eyes: EOM are normal.   Neck: Neck supple.   Cardiovascular: Normal rate and regular rhythm.   Pulmonary/Chest: Tachypnea noted. She has rales.   Tachypnea   Abdominal: Soft. There is no tenderness.   Genitourinary:   Genitourinary Comments: Pfeiffer in place   Musculoskeletal: She exhibits edema.   Trace edema bilateral lower extremity   Neurological: She is alert and oriented to person, place, and time.   Skin: Skin is warm.   Psychiatric: She has a normal mood and affect. Her behavior is normal. Judgment and thought content normal.   Nursing note and vitals reviewed.      Significant Labs:   Blood Culture:   Recent Labs   Lab 11/26/18 1807   LABBLOO No Growth to date  No Growth to date     CBC:   Recent Labs   Lab 11/26/18 1807 11/27/18  0546   WBC 10.37 9.61   HGB 10.1* 8.2*   HCT 31.3* 25.3*    186     CMP:   Recent Labs   Lab 11/26/18  1807 11/27/18  0546    143   K 4.1 4.1    109   CO2 17* 21*   * 241*   BUN 69* 68*   CREATININE 4.1* 4.0*   CALCIUM 9.2 8.6*   PROT 7.2  --    ALBUMIN 3.4*  --    BILITOT 0.8  --    ALKPHOS 84  --    AST 25  --    ALT 13  --    ANIONGAP 14 13   EGFRNONAA 10* 11*     Troponin:   Recent Labs   Lab 11/26/18  2359 11/27/18  0546 11/27/18  1358   TROPONINI 15.866* 16.308* 11.162*       Significant Imaging:   Imaging Results          X-Ray Chest AP Portable (Final result)  Result time 11/26/18 17:53:35    Final result by Sebastien Pettit III, MD (11/26/18 17:53:35)                 Impression:      Probable bilateral perihilar edema/congestion although cannot exclude pneumonia.  Follow-up recommended      Electronically signed by: Sebastien Pettit MD  Date:    11/26/2018  Time:    17:53             Narrative:    EXAMINATION:  XR CHEST AP  PORTABLE    CLINICAL HISTORY:  Shortness of breath    COMPARISON:  May 2017    FINDINGS:  The heart size is normal.  Extensive bilateral infiltrates greatest in the perihilar Artem greater on the left.  This may be due to pulmonary edema, asymmetric however bilateral pneumonia cannot be excluded.  Follow-up recommended.

## 2018-11-27 NOTE — ASSESSMENT & PLAN NOTE
- O2 sat stable on 60% FiO2 per BiPAP, wean as tolerated.  -BIPAP weaned to high flow oxygen   - IV lasix 120mg given in ED with ~1L diuresed.    - Continue aggressive diuresis with IV Lasix 80mg BID.  - Strict I&O's, daily weights,   - Na/fluid restriction.  -  2D echo results showed EF 45% with diastolic dysfunction, PAP 69, +WMA, and mild to moderate MR.   - Cardiology and Pulmonology following

## 2018-11-27 NOTE — ASSESSMENT & PLAN NOTE
- Initial troponin 4.417, EKG with inferior ST depression (no previous to compare).  Chest pain free.  - Trend serial cardiac enzymes and EKG.  - Check 2D echo, FLP.  - Continue ASA and high-intensity statin.  Will add BB.  - Continue IV heparin drip per ACS protocol.  - Cardiology to eval in AM.

## 2018-11-27 NOTE — CONSULTS
"[facility]  Cardiology  Consult Note    Patient Name: Avril Monzon  MRN: [id number]  Admission Date: 11/26/2018  Hospital Length of Stay: 1 days  Code Status: Full Code   Attending Provider: Zi Landon MD   Consulting Provider: Moriah Tran PA-C  Primary Care Physician: Rose Parks MD  Principal Problem:NSTEMI (non-ST elevated myocardial infarction)    Patient information was obtained from patient, past medical records and ER records.     Inpatient consult to Cardiology  Consult performed by: Moriah Tran PA-C  Consult ordered by: Lizzette Holder MD        Subjective:     Chief Complaint:  NSTEMI/CHF    HPI:   Ms. Monzon is a 73 year old female patient with a PMHx of HTN, hyperlipidemia, DM type II, former tobacco abuse, CKD, anemia, and CVA who presented to [facility] ED last night with a chief complaint of progressively worsening SOB over the past 2-3 days. Patient stated she began to feel like she was coming down with a "cold or sinus infection" on Friday and developed a persistent cough when lying down. Associated symptoms included fatigue and subjective fever. Patient denied any associated ministerio chest pain, palpitations, weight gain, nausea, vomiting, diaphoresis, near syncope, or syncope. Initial workup in ED revealed creatinine of 4.1, BNP of 1613, troponin of 4.4, and lactic acid of 2.7. CXR showed evidence of fluid overload. EKG showed inferior ST depression and patient subsequently admitted for further evaluation and treatment. Cardiology consulted to assist with management. Patient seen and examined today while in ED. States SOB is improving. Remains chest pain free. Denies any prior cardiac history-no known CAD or history of MI. Patient reports compliance with her medications. Chart reviewed. Troponin 4417>15.866>16.308. Repeat EKG this AM showed diffuse ST depression in anterior leads. 2D echo showed EF of 45-50%, +WMA, pulmonary HTN, and mild to moderate "     Past Medical History:   Diagnosis Date    Acute hypoxemic respiratory failure 11/26/2018    Anemia     Arthritis     back, hand, knees    Back pain     Cataract     CKD stage G4/A3, GFR 15 - 29 and albumin creatinine ratio >300 mg/g     GFR 40% Jan 2014 and 33% ub 3/2014 ([facility])    Diabetic retinopathy     DM (diabetes mellitus) type II controlled, neurological manifestation     GERD (gastroesophageal reflux disease)     Glaucoma     Hyperlipidemia     Hypertension     NSTEMI (non-ST elevated myocardial infarction) 11/27/2018    Peripheral neuropathy     Polyneuropathy     Proteinuria     >6 mo     Seizures     [facility] 1/2014 -dick CT NRI showed small vessel    Stroke 2012,2014    Tobacco dependence     Type 2 diabetes with peripheral circulatory disorder, controlled        Past Surgical History:   Procedure Laterality Date    BREAST MASS EXCISION      ,benign, age 15.    HYSTERECTOMY  1982    wrist cyst Right 1980s    dorsal wrist cyst       Review of patient's allergies indicates:   Allergen Reactions    Codeine Swelling    Darvon [propoxyphene] Swelling       No current facility-administered medications on file prior to encounter.      Current Outpatient Medications on File Prior to Encounter   Medication Sig    amLODIPine (NORVASC) 10 MG tablet TAKE ONE TABLET BY MOUTH ONCE DAILY    atorvastatin (LIPITOR) 80 MG tablet Take 1 tablet (80 mg total) by mouth once daily.    blood sugar diagnostic Strp 1 strip by Misc.(Non-Drug; Combo Route) route 3 (three) times daily. relion ultima    calcifediol (RAYALDEE) 30 mcg Cs24 Take 30 mcg by mouth once daily.    hydrALAZINE (APRESOLINE) 50 MG tablet Take 1 tablet (50 mg total) by mouth 2 (two) times daily.    insulin NPH-insulin regular, 70/30, (NOVOLIN 70/30) 100 unit/mL (70-30) injection INJECT SUBCUTANEOUSLY 30 UNITS IN THE MORNING AND INJECT 25 UNITS IN THE EVENING (Patient taking differently: INJECT SUBCUTANEOUSLY 30 UNITS IN THE MORNING  AND INJECT 25 UNITS IN THE EVENING)    lancets (ACCU-CHEK SOFTCLIX LANCETS) Misc 1 lancet by Misc.(Non-Drug; Combo Route) route 3 (three) times daily.    lisinopril (PRINIVIL,ZESTRIL) 20 MG tablet Take 2 tablets (40 mg total) by mouth once daily.    sodium bicarbonate 325 MG tablet TAKE ONE TABLET BY MOUTH THREE TIMES DAILY    torsemide (DEMADEX) 20 MG Tab Take 1 tablet (20 mg total) by mouth once daily.     Family History     Problem Relation (Age of Onset)    Cancer Maternal Grandmother    Diabetes Mother    Heart disease Mother    Hyperlipidemia Mother    Hypertension Mother    Muscular dystrophy Son        Tobacco Use    Smoking status: Former Smoker     Packs/day: 1.50     Years: 30.00     Pack years: 45.00     Types: Cigarettes     Last attempt to quit: 1990     Years since quittin.9    Smokeless tobacco: Former User   Substance and Sexual Activity    Alcohol use: No     Alcohol/week: 0.0 oz    Drug use: No    Sexual activity: No     Review of Systems   Constitution: Positive for fever (subjective), weakness and malaise/fatigue.   HENT: Positive for congestion.    Eyes: Negative.    Cardiovascular: Positive for orthopnea.   Respiratory: Positive for cough, shortness of breath and sleep disturbances due to breathing.    Endocrine: Negative.    Hematologic/Lymphatic: Negative.    Skin: Negative.    Musculoskeletal: Negative.    Gastrointestinal: Negative.    Genitourinary: Negative.    Psychiatric/Behavioral: Negative.    Allergic/Immunologic: Negative.      Objective:     Vital Signs (Most Recent):  Temp: 98.3 °F (36.8 °C) (18 0642)  Pulse: 77 (18 1301)  Resp: (!) 30 (18 1301)  BP: (!) 112/55 (18 1301)  SpO2: 95 % (18 1301) Vital Signs (24h Range):  Temp:  [97.7 °F (36.5 °C)-98.7 °F (37.1 °C)] 98.3 °F (36.8 °C)  Pulse:  [] 77  Resp:  [17-39] 30  SpO2:  [82 %-100 %] 95 %  BP: (108-181)/(54-88) 112/55     Weight: 78 kg (171 lb 14.4 oz)  Body mass index is  28.61 kg/m².    SpO2: 95 %  O2 Device (Oxygen Therapy): Vapotherm      Intake/Output Summary (Last 24 hours) at 11/27/2018 1313  Last data filed at 11/27/2018 0638  Gross per 24 hour   Intake 19.62 ml   Output 1375 ml   Net -1355.38 ml       Lines/Drains/Airways     Drain                 Urethral Catheter 11/26/18 1823 Latex 16 Fr. less than 1 day          Peripheral Intravenous Line                 Peripheral IV - Single Lumen 11/26/18 1806 Right Hand less than 1 day         Peripheral IV - Single Lumen 11/26/18 1916 Right Antecubital less than 1 day                Physical Exam   Constitutional: She is oriented to person, place, and time. She appears well-developed and well-nourished. No distress.   HENT:   Head: Normocephalic and atraumatic.   Eyes: Pupils are equal, round, and reactive to light. Right eye exhibits no discharge. Left eye exhibits no discharge.   Neck: Neck supple. JVD present. No thyromegaly present.   Cardiovascular: Normal rate, regular rhythm, S1 normal and S2 normal.   No murmur heard.  Pulmonary/Chest: Effort normal. She has rales.   Diminished BS right base   Abdominal: Soft. She exhibits no distension. There is no rebound.   Musculoskeletal: She exhibits edema.   Neurological: She is alert and oriented to person, place, and time.   Skin: Skin is warm and dry. She is not diaphoretic. No erythema.   Psychiatric: She has a normal mood and affect. Her behavior is normal. Thought content normal.   Nursing note and vitals reviewed.      Significant Labs:   CMP   Recent Labs   Lab 11/26/18 1807 11/27/18  0546    143   K 4.1 4.1    109   CO2 17* 21*   * 241*   BUN 69* 68*   CREATININE 4.1* 4.0*   CALCIUM 9.2 8.6*   PROT 7.2  --    ALBUMIN 3.4*  --    BILITOT 0.8  --    ALKPHOS 84  --    AST 25  --    ALT 13  --    ANIONGAP 14 13   ESTGFRAFRICA 12* 12*   EGFRNONAA 10* 11*   , CBC   Recent Labs   Lab 11/26/18 1807 11/27/18  0546   WBC 10.37 9.61   HGB 10.1* 8.2*   HCT 31.3*  25.3*    186   , Troponin   Recent Labs   Lab 11/26/18  1807 11/26/18  2359 11/27/18  0546   TROPONINI 4.417* 15.866* 16.308*    and All pertinent lab results from the last 24 hours have been reviewed.    Significant Imaging: Echocardiogram:   2D echo with color flow doppler:   Results for orders placed or performed during the hospital encounter of 11/26/18   2D echo with color flow doppler   Result Value Ref Range    QEF 45 55 - 65    Mitral Valve Regurgitation MILD TO MODERATE     Diastolic Dysfunction Yes (A)     Est. PA Systolic Pressure 69.15 (A)     Tricuspid Valve Regurgitation MILD    , EKG: Reviewed and X-Ray: CXR: X-Ray Chest 1 View (CXR): No results found for this visit on 11/26/18. and X-Ray Chest PA and Lateral (CXR): No results found for this visit on 11/26/18.    Assessment and Plan:   Patient who presents with decompensated CHF/respiratory failure in setting of NSTEMI. Continue diuresis. Meds further optimized. Needs R/LHC once more stable.    * NSTEMI (non-ST elevated myocardial infarction)    -Patient who presented in fulminant pulmonary edema/decompensated CHF in setting of MI  -Troponin 4.417>15.866>16.308  -EKG with ischemic changes anteriorly this AM  -2D echo showed EF of 45-50% with WMA  -Needs IV diuresis/optimization of medical therapy  -Continue ASA, amlodipine, statin, hydralazine, lopressor  -Add oral nitrates  -Continue heparin gtt for anticoagulation  -Will need R/LHC once optimized. Patient is at high risk of contrast-induced nephropathy/transition to dialysis. All risks, benefits, and treatment alternatives explained to patient in detail.  -Nephrology on board, appreciate assistance      Hyperlipidemia    -Continue statin  -Check lipid panel     Essential hypertension    -Continue amlodipine, Lopressor, hydralazine  -Add Isosorbide mononitrate  -Monitor     Acute hypoxemic respiratory failure secondary to acute decompensated HF    -Patient who presents with acute decompensated  CHF secondary to NSTEMI/MI  -Needs IV diuresis  -Strict I's/O's  -Assess response  -Continue other meds  -No ACEI/ARB given underlying renal function  -Nephrology on board         VTE Risk Mitigation (From admission, onward)        Ordered     Place sequential compression device  Until discontinued      11/27/18 0032     heparin 25,000 units in dextrose 5% 250 mL (100 units/mL) infusion LOW INTENSITY nomogram - [facility]  Continuous      11/26/18 1905     heparin 25,000 units in dextrose 5% (100 units/ml) IV bolus from bag - ADDITIONAL PRN BOLUS - 60 units/kg (max bolus 4000 units)  As needed (PRN)      11/26/18 1905     heparin 25,000 units in dextrose 5% (100 units/ml) IV bolus from bag - ADDITIONAL PRN BOLUS - 30 units/kg (max bolus 4000 units)  As needed (PRN)      11/26/18 1905          Thank you for your consult. I will follow-up with patient. Please contact us if you have any additional questions.    Moriah Tran PA-C  Cardiology   [facility]     Chart reviewed. Dr. Holman examined patient and agrees with plan as outlined above.

## 2018-11-27 NOTE — SUBJECTIVE & OBJECTIVE
Past Medical History:   Diagnosis Date    Acute hypoxemic respiratory failure 11/26/2018    Anemia     Arthritis     back, hand, knees    Back pain     Cataract     CKD stage G4/A3, GFR 15 - 29 and albumin creatinine ratio >300 mg/g     GFR 40% Jan 2014 and 33% ub 3/2014 ([facility])    Diabetic retinopathy     DM (diabetes mellitus) type II controlled, neurological manifestation     GERD (gastroesophageal reflux disease)     Glaucoma     Hyperlipidemia     Hypertension     NSTEMI (non-ST elevated myocardial infarction) 11/27/2018    Peripheral neuropathy     Polyneuropathy     Proteinuria     >6 mo     Seizures     [facility] 1/2014 -dick CT NRI showed small vessel    Stroke 2012,2014    Tobacco dependence     Type 2 diabetes with peripheral circulatory disorder, controlled        Past Surgical History:   Procedure Laterality Date    BREAST MASS EXCISION      ,benign, age 15.    HYSTERECTOMY  1982    wrist cyst Right 1980s    dorsal wrist cyst       Review of patient's allergies indicates:   Allergen Reactions    Codeine Swelling    Darvon [propoxyphene] Swelling     Current Facility-Administered Medications   Medication Frequency    acetaminophen tablet 650 mg Q6H PRN    amLODIPine tablet 10 mg Daily    aspirin EC tablet 81 mg Daily    atorvastatin tablet 80 mg Daily    calcifediol Cs24 30 mcg Daily    dextrose 50% injection 12.5 g PRN    furosemide injection 80 mg BID    glucagon (human recombinant) injection 1 mg PRN    heparin 25,000 units in dextrose 5% (100 units/ml) IV bolus from bag - ADDITIONAL PRN BOLUS - 30 units/kg (max bolus 4000 units) PRN    heparin 25,000 units in dextrose 5% (100 units/ml) IV bolus from bag - ADDITIONAL PRN BOLUS - 60 units/kg (max bolus 4000 units) PRN    heparin 25,000 units in dextrose 5% 250 mL (100 units/mL) infusion LOW INTENSITY nomogram - [facility] Continuous    hydrALAZINE tablet 50 mg BID    insulin aspart U-100 pen 0-5 Units Q6H PRN    metoprolol  tartrate (LOPRESSOR) tablet 25 mg BID    nitroGLYCERIN 2% TD oint ointment 1 inch Q8H    nitroGLYCERIN SL tablet 0.4 mg Q5 Min PRN    ondansetron injection 4 mg Q6H PRN    pantoprazole EC tablet 40 mg Daily    promethazine (PHENERGAN) 6.25 mg in dextrose 5 % 50 mL IVPB Q6H PRN    sodium bicarbonate tablet 325 mg TID     Current Outpatient Medications   Medication    amLODIPine (NORVASC) 10 MG tablet    atorvastatin (LIPITOR) 80 MG tablet    blood sugar diagnostic Strp    calcifediol (RAYALDEE) 30 mcg Cs24    hydrALAZINE (APRESOLINE) 50 MG tablet    insulin NPH-insulin regular, 70/30, (NOVOLIN 70/30) 100 unit/mL (70-30) injection    lancets (ACCU-CHEK SOFTCLIX LANCETS) Misc    lisinopril (PRINIVIL,ZESTRIL) 20 MG tablet    sodium bicarbonate 325 MG tablet    torsemide (DEMADEX) 20 MG Tab     Family History     Problem Relation (Age of Onset)    Cancer Maternal Grandmother    Diabetes Mother    Heart disease Mother    Hyperlipidemia Mother    Hypertension Mother    Muscular dystrophy Son        Tobacco Use    Smoking status: Former Smoker     Packs/day: 1.50     Years: 30.00     Pack years: 45.00     Types: Cigarettes     Last attempt to quit: 1990     Years since quittin.9    Smokeless tobacco: Former User   Substance and Sexual Activity    Alcohol use: No     Alcohol/week: 0.0 oz    Drug use: No    Sexual activity: No     Review of Systems   Constitutional: Positive for activity change and fatigue. Negative for appetite change and fever.   HENT: Negative for congestion, facial swelling, sore throat, trouble swallowing and voice change.    Eyes: Negative for redness and visual disturbance.   Respiratory: Positive for shortness of breath. Negative for apnea, cough, chest tightness and wheezing.    Cardiovascular: Positive for chest pain. Negative for palpitations and leg swelling.   Gastrointestinal: Negative for abdominal distention, abdominal pain, blood in stool, constipation, diarrhea,  nausea and vomiting.   Genitourinary: Negative for decreased urine volume, difficulty urinating, dysuria, flank pain, frequency, hematuria, pelvic pain and urgency.   Musculoskeletal: Negative for back pain, gait problem and joint swelling.   Skin: Negative for color change and rash.   Neurological: Positive for weakness. Negative for dizziness, syncope and headaches.   Hematological: Does not bruise/bleed easily.   Psychiatric/Behavioral: Negative for agitation, behavioral problems and confusion. The patient is not nervous/anxious.      Objective:     Vital Signs (Most Recent):  Temp: 98.3 °F (36.8 °C) (11/27/18 0642)  Pulse: 78 (11/27/18 1142)  Resp: (!) 21 (11/27/18 1142)  BP: (!) 113/56 (11/27/18 1142)  SpO2: (!) 94 % (11/27/18 1142)  O2 Device (Oxygen Therapy): Vapotherm (11/27/18 1142) Vital Signs (24h Range):  Temp:  [97.7 °F (36.5 °C)-98.7 °F (37.1 °C)] 98.3 °F (36.8 °C)  Pulse:  [] 78  Resp:  [17-39] 21  SpO2:  [82 %-100 %] 94 %  BP: (113-181)/(56-88) 113/56     Weight: 78 kg (171 lb 14.4 oz) (11/26/18 1902)  Body mass index is 28.61 kg/m².  Body surface area is 1.89 meters squared.    I/O last 3 completed shifts:  In: 19.6 [IV Piggyback:19.6]  Out: 1375 [Urine:1375]    Physical Exam   Constitutional: She is oriented to person, place, and time. She appears well-developed. She appears distressed.   HENT:   Head: Normocephalic and atraumatic.   Mouth/Throat: Oropharynx is clear and moist. No oropharyngeal exudate.   Eyes: Conjunctivae and EOM are normal. Pupils are equal, round, and reactive to light.   Neck: Normal range of motion. Neck supple. No JVD present. Carotid bruit is not present. No tracheal deviation present. No thyroid mass and no thyromegaly present.   Cardiovascular: Normal rate, regular rhythm and intact distal pulses. Exam reveals no gallop and no friction rub.   Murmur heard.  Pulmonary/Chest: Effort normal. No respiratory distress. She has no wheezes. She has rales. She exhibits no  tenderness.   Abdominal: Soft. Bowel sounds are normal. She exhibits no distension, no abdominal bruit, no ascites and no mass. There is no hepatosplenomegaly. There is no tenderness. There is no rebound, no guarding and no CVA tenderness.   Musculoskeletal: She exhibits no edema or tenderness.   Lymphadenopathy:     She has no cervical adenopathy.   Neurological: She is alert and oriented to person, place, and time. She has normal reflexes. She displays normal reflexes. No cranial nerve deficit. She exhibits normal muscle tone. Coordination normal.   Skin: Skin is warm and intact. No rash noted. No erythema. No pallor.       Significant Labs:  Cardiac Markers: No results for input(s): CKMB, TROPONINT, MYOGLOBIN in the last 168 hours.  CBC:   Recent Labs   Lab 11/27/18  0546   WBC 9.61   RBC 2.67*   HGB 8.2*   HCT 25.3*      MCV 95   MCH 30.7   MCHC 32.4     CMP:   Recent Labs   Lab 11/26/18 1807 11/27/18  0546   * 241*   CALCIUM 9.2 8.6*   ALBUMIN 3.4*  --    PROT 7.2  --     143   K 4.1 4.1   CO2 17* 21*    109   BUN 69* 68*   CREATININE 4.1* 4.0*   ALKPHOS 84  --    ALT 13  --    AST 25  --    BILITOT 0.8  --      Coagulation:   Recent Labs   Lab 11/26/18 1807 11/27/18  0944   INR 0.9  --   --    APTT 23.9   < > 72.3*    < > = values in this interval not displayed.     All labs within the past 24 hours have been reviewed.    Significant Imaging: reviewed     Lab Results   Component Value Date    .0 (H) 01/23/2018    CALCIUM 8.6 (L) 11/27/2018    PHOS 3.6 07/31/2018       Lab Results   Component Value Date    ALBUMIN 3.4 (L) 11/26/2018

## 2018-11-27 NOTE — HPI
73-year-old female patient presenting for 3 days history of worsening shortness of breath, chest congestion, coughing, and low-grade fever.  She felt she had upper respiratory infection about 3 days ago.    Forty-five pack year smoking history quit in 1990s.    Does not have oxygen or NIV at home.

## 2018-11-27 NOTE — SUBJECTIVE & OBJECTIVE
Past Medical History:   Diagnosis Date    Acute hypoxemic respiratory failure 2018    Anemia     Arthritis     back, hand, knees    Back pain     Cataract     CKD stage G4/A3, GFR 15 - 29 and albumin creatinine ratio >300 mg/g     GFR 40% 2014 and 33% ub 3/2014 ([facility])    Diabetic retinopathy     DM (diabetes mellitus) type II controlled, neurological manifestation     GERD (gastroesophageal reflux disease)     Glaucoma     Hyperlipidemia     Hypertension     NSTEMI (non-ST elevated myocardial infarction) 2018    Peripheral neuropathy     Polyneuropathy     Proteinuria     >6 mo     Seizures     [facility] 2014 -dick CT NRI showed small vessel    Stroke ,    Tobacco dependence     Type 2 diabetes with peripheral circulatory disorder, controlled        Past Surgical History:   Procedure Laterality Date    BREAST MASS EXCISION      ,benign, age 15.    HYSTERECTOMY      wrist cyst Right 1980s    dorsal wrist cyst       Review of patient's allergies indicates:   Allergen Reactions    Codeine Swelling    Darvon [propoxyphene] Swelling       Family History     Problem Relation (Age of Onset)    Cancer Maternal Grandmother    Diabetes Mother    Heart disease Mother    Hyperlipidemia Mother    Hypertension Mother    Muscular dystrophy Son        Tobacco Use    Smoking status: Former Smoker     Packs/day: 1.50     Years: 30.00     Pack years: 45.00     Types: Cigarettes     Last attempt to quit: 1990     Years since quittin.9    Smokeless tobacco: Former User   Substance and Sexual Activity    Alcohol use: No     Alcohol/week: 0.0 oz    Drug use: No    Sexual activity: No         Review of Systems   Constitutional: Positive for chills, fatigue and unexpected weight change.   HENT: Positive for congestion.    Eyes: Negative for discharge.   Respiratory: Positive for cough, chest tightness and shortness of breath.    Cardiovascular: Positive for leg swelling.    Gastrointestinal: Negative for abdominal pain and blood in stool.   Endocrine: Negative for polyphagia.        Diabetes mellitus     Genitourinary: Negative for hematuria.   Musculoskeletal: Positive for arthralgias and myalgias.   Skin: Negative for wound.   Allergic/Immunologic: Negative for immunocompromised state.   Neurological: Negative for seizures.        History of stroke  History of seizure  Polyneuropathy   Hematological: Negative for adenopathy.   Psychiatric/Behavioral: The patient is nervous/anxious.      Objective:     Vital Signs (Most Recent):  Temp: 98.3 °F (36.8 °C) (11/27/18 0642)  Pulse: 77 (11/27/18 1301)  Resp: (!) 30 (11/27/18 1301)  BP: (!) 112/55 (11/27/18 1301)  SpO2: 95 % (11/27/18 1301) Vital Signs (24h Range):  Temp:  [97.7 °F (36.5 °C)-98.7 °F (37.1 °C)] 98.3 °F (36.8 °C)  Pulse:  [] 77  Resp:  [17-39] 30  SpO2:  [82 %-100 %] 95 %  BP: (108-181)/(54-88) 112/55     Weight: 78 kg (171 lb 14.4 oz)  Body mass index is 28.61 kg/m².      Intake/Output Summary (Last 24 hours) at 11/27/2018 1327  Last data filed at 11/27/2018 0638  Gross per 24 hour   Intake 19.62 ml   Output 1375 ml   Net -1355.38 ml       Physical Exam   Constitutional: She is oriented to person, place, and time. She appears well-developed and well-nourished. No distress.   HENT:   Head: Normocephalic and atraumatic.   On Vapotherm   Eyes: EOM are normal.   Neck: Neck supple.   Cardiovascular: Normal rate and regular rhythm.   Pulmonary/Chest: She has rales.   Tachypnea   Abdominal: Soft. There is no tenderness.   Genitourinary:   Genitourinary Comments: Pfeiffer in place   Musculoskeletal: She exhibits edema.   Trace edema bilateral lower extremity   Neurological: She is alert and oriented to person, place, and time.   Skin: Skin is warm.   Psychiatric: She has a normal mood and affect. Her behavior is normal. Judgment and thought content normal.   Nursing note and vitals reviewed.      Vents:  Oxygen Concentration  (%): 60 (11/27/18 1301)    Lines/Drains/Airways     Drain                 Urethral Catheter 11/26/18 1823 Latex 16 Fr. less than 1 day          Peripheral Intravenous Line                 Peripheral IV - Single Lumen 11/26/18 1806 Right Hand less than 1 day         Peripheral IV - Single Lumen 11/26/18 1916 Right Antecubital less than 1 day                Significant Labs:    CBC/Anemia Profile:  Recent Labs   Lab 11/26/18 1807 11/27/18  0546   WBC 10.37 9.61   HGB 10.1* 8.2*   HCT 31.3* 25.3*    186   MCV 94 95   RDW 13.2 13.2        Chemistries:  Recent Labs   Lab 11/26/18 1807 11/27/18  0546    143   K 4.1 4.1    109   CO2 17* 21*   BUN 69* 68*   CREATININE 4.1* 4.0*   CALCIUM 9.2 8.6*   ALBUMIN 3.4*  --    PROT 7.2  --    BILITOT 0.8  --    ALKPHOS 84  --    ALT 13  --    AST 25  --      Troponin 15    Blood culture negative so far    Significant Imaging:   CXR: I have reviewed all pertinent results/findings within the past 24 hours and my personal findings are:  Bilateral pulmonary edema        Echo    1 - Wall motion abnormalities.     2 - Mildly depressed left ventricular systolic function (EF 45-50%).     3 - Impaired LV relaxation, normal LAP (grade 1 diastolic dysfunction).     4 - Normal right ventricular systolic function .     5 - Pulmonary hypertension. The estimated PA systolic pressure is 69 mmHg.     6 - Mild to moderate mitral regurgitation.     7 - Mild tricuspid regurgitation.     8 - Intermediate central venous pressure.     9 - Right pleural effusion.

## 2018-11-27 NOTE — ASSESSMENT & PLAN NOTE
- O2 sat stable on 60% FiO2 per BiPAP, wean as tolerated.  - IV lasix 120mg given in ED with ~1L diuresed.    - Continue aggressive diuresis with IV Lasix 80mg BID.  - Strict I&O's, daily weights, Na/fluid restriction.  - Repeat 2D echo in AM.  - Cardiology consult in AM.

## 2018-11-27 NOTE — HPI
Ms. Monzon is a 74 yo female with a PMHx of HTN, HLD, DM II, CKD stage IV, anemia of CKD, and h/o CVA.  She presented to the ED with c/o SOB that has progressively worsened since yesterday.  Associated nonproductive cough and subjective fever.  No aggravating or alleviating factors.  Denies any CP, palpitations, diaphoresis, N/V, orthopnea, PND, edema, weight gain, ABD pain, diarrhea, dysuria, decreased UOP, hematuria, back pain, HA, AMS, lightheadedness, dizziness, syncope, weakness, numbness, or chills.  Per EMS, patient hypoxic on arrival to scene with O2 sat 80% on RA.  She was given Duoneb x 1 and placed on 4L NC with improved O2 sat of 94%.  Upon arrival to ED, patient's O2 sat 82% on 4L NC.  She was placed on BiPAP with increased O2 sat of 100%.  Work-up in ED resulted cr. 4.1, BUN 69, glucose 336, BNP 1613, troponin 4.417, lactic 2.7 > 1.5.  ABG with pH 7.324, pCO2 31.5, pO2 144, HCO3 16.4, BE -10 on BiPAP.  CXR showed pulmonary vascular congestion, pulmonary edema, and small bilateral pleural effusions.  EKG showed normal sinus rhythm with inferior ST depression, no previous to compare.  ASA 325mg, IV lopressor 5mg, and IV Lasix 120mg given per ED.  Case discussed with Cardiology in ED and IV heparin drip initiated.  Hospital Medicine called for admission.  Currently, patient appears comfortable in NAD.  Reports SOB improved on BiPAP.  Denies any CP.  No h/o CAD.  Denies having stress test in the past.  2D echo in 6/2018 showed LVEF of 60-65%, normal diastolic function, and no significant valvular abnormalities.

## 2018-11-27 NOTE — SUBJECTIVE & OBJECTIVE
Past Medical History:   Diagnosis Date    Acute hypoxemic respiratory failure 11/26/2018    Anemia     Arthritis     back, hand, knees    Back pain     Cataract     CKD stage G4/A3, GFR 15 - 29 and albumin creatinine ratio >300 mg/g     GFR 40% Jan 2014 and 33% ub 3/2014 ([facility])    Diabetic retinopathy     DM (diabetes mellitus) type II controlled, neurological manifestation     GERD (gastroesophageal reflux disease)     Glaucoma     Hyperlipidemia     Hypertension     Peripheral neuropathy     Polyneuropathy     Proteinuria     >6 mo     Seizures     [facility] 1/2014 -dick CT NRI showed small vessel    Stroke 2012,2014    Tobacco dependence     Type 2 diabetes with peripheral circulatory disorder, controlled        Past Surgical History:   Procedure Laterality Date    BREAST MASS EXCISION      ,benign, age 15.    HYSTERECTOMY  1982    wrist cyst Right 1980s    dorsal wrist cyst       Review of patient's allergies indicates:   Allergen Reactions    Codeine Swelling    Darvon [propoxyphene] Swelling       No current facility-administered medications on file prior to encounter.      Current Outpatient Medications on File Prior to Encounter   Medication Sig    amLODIPine (NORVASC) 10 MG tablet TAKE ONE TABLET BY MOUTH ONCE DAILY    atorvastatin (LIPITOR) 80 MG tablet Take 1 tablet (80 mg total) by mouth once daily.    blood sugar diagnostic Strp 1 strip by Misc.(Non-Drug; Combo Route) route 3 (three) times daily. relion ultima    calcifediol (RAYALDEE) 30 mcg Cs24 Take 30 mcg by mouth once daily.    hydrALAZINE (APRESOLINE) 50 MG tablet Take 1 tablet (50 mg total) by mouth 2 (two) times daily.    insulin NPH-insulin regular, 70/30, (NOVOLIN 70/30) 100 unit/mL (70-30) injection INJECT SUBCUTANEOUSLY 30 UNITS IN THE MORNING AND INJECT 25 UNITS IN THE EVENING (Patient taking differently: INJECT SUBCUTANEOUSLY 30 UNITS IN THE MORNING AND INJECT 25 UNITS IN THE EVENING)    lancets (ACCU-CHEK SOFTCLIX  LANCETS) Misc 1 lancet by Misc.(Non-Drug; Combo Route) route 3 (three) times daily.    lisinopril (PRINIVIL,ZESTRIL) 20 MG tablet Take 2 tablets (40 mg total) by mouth once daily.    sodium bicarbonate 325 MG tablet TAKE ONE TABLET BY MOUTH THREE TIMES DAILY    torsemide (DEMADEX) 20 MG Tab Take 1 tablet (20 mg total) by mouth once daily.     Family History     Problem Relation (Age of Onset)    Cancer Maternal Grandmother    Diabetes Mother    Heart disease Mother    Hyperlipidemia Mother    Hypertension Mother    Muscular dystrophy Son        Tobacco Use    Smoking status: Former Smoker     Packs/day: 1.50     Years: 30.00     Pack years: 45.00     Types: Cigarettes     Last attempt to quit: 1990     Years since quittin.9    Smokeless tobacco: Former User   Substance and Sexual Activity    Alcohol use: No     Alcohol/week: 0.0 oz    Drug use: No    Sexual activity: No     Review of Systems   Constitutional: Positive for fever (subjective). Negative for chills, diaphoresis, fatigue and unexpected weight change.   HENT: Negative for congestion, sore throat and trouble swallowing.    Respiratory: Positive for cough and shortness of breath. Negative for chest tightness and wheezing.    Cardiovascular: Negative for chest pain, palpitations and leg swelling.   Gastrointestinal: Negative for abdominal distention, abdominal pain, blood in stool, constipation, diarrhea, nausea and vomiting.   Genitourinary: Negative for difficulty urinating, dysuria, frequency, hematuria and urgency.   Musculoskeletal: Negative for arthralgias, back pain and myalgias.   Skin: Negative for pallor, rash and wound.   Neurological: Negative for dizziness, syncope, weakness, light-headedness and headaches.   Psychiatric/Behavioral: Negative for confusion. The patient is not nervous/anxious.    All other systems reviewed and are negative.    Objective:     Vital Signs (Most Recent):  Temp: 98 °F (36.7 °C) (18 2359)  Pulse:  79 (11/26/18 2359)  Resp: (!) 32 (11/26/18 2359)  BP: 133/63 (11/26/18 2359)  SpO2: 100 % (11/26/18 2359) Vital Signs (24h Range):  Temp:  [97.7 °F (36.5 °C)-98.7 °F (37.1 °C)] 98 °F (36.7 °C)  Pulse:  [] 79  Resp:  [23-39] 32  SpO2:  [82 %-100 %] 100 %  BP: (133-181)/(63-88) 133/63     Weight: 78 kg (171 lb 14.4 oz)  Body mass index is 28.61 kg/m².    Physical Exam   Constitutional: She is oriented to person, place, and time. She appears well-developed and well-nourished. No distress.   HENT:   Head: Normocephalic and atraumatic.   Eyes: Conjunctivae are normal.   PERRL; EOM intact.   Neck: Normal range of motion. Neck supple. JVD present.   Cardiovascular: Normal rate, regular rhythm, S1 normal, S2 normal and intact distal pulses.  No extrasystoles are present. Exam reveals no gallop and no friction rub.   No murmur heard.  Pulses:       Radial pulses are 2+ on the right side, and 2+ on the left side.        Dorsalis pedis pulses are 2+ on the right side, and 2+ on the left side.        Posterior tibial pulses are 2+ on the right side, and 2+ on the left side.   Pulmonary/Chest: Effort normal. No accessory muscle usage. Tachypnea noted. No respiratory distress. She has no wheezes. She has no rhonchi. She has rales in the right lower field and the left lower field.   BiPAP in place.   Abdominal: Soft. Bowel sounds are normal. She exhibits no distension. There is no tenderness. There is no rebound, no guarding and no CVA tenderness.   Musculoskeletal: Normal range of motion. She exhibits no edema, tenderness or deformity.   Neurological: She is alert and oriented to person, place, and time. No cranial nerve deficit or sensory deficit.   Skin: Skin is warm, dry and intact. Capillary refill takes less than 2 seconds. No rash noted. She is not diaphoretic. No cyanosis or erythema.   Psychiatric: She has a normal mood and affect. Her speech is normal and behavior is normal. Cognition and memory are normal.    Nursing note and vitals reviewed.          Significant Labs:   Results for orders placed or performed during the hospital encounter of 11/26/18   CBC auto differential   Result Value Ref Range    WBC 10.37 3.90 - 12.70 K/uL    RBC 3.32 (L) 4.00 - 5.40 M/uL    Hemoglobin 10.1 (L) 12.0 - 16.0 g/dL    Hematocrit 31.3 (L) 37.0 - 48.5 %    MCV 94 82 - 98 fL    MCH 30.4 27.0 - 31.0 pg    MCHC 32.3 32.0 - 36.0 g/dL    RDW 13.2 11.5 - 14.5 %    Platelets 223 150 - 350 K/uL    MPV 10.1 9.2 - 12.9 fL    Gran # (ANC) 9.4 (H) 1.8 - 7.7 K/uL    Lymph # 0.5 (L) 1.0 - 4.8 K/uL    Mono # 0.5 0.3 - 1.0 K/uL    Eos # 0.0 0.0 - 0.5 K/uL    Baso # 0.00 0.00 - 0.20 K/uL    Gran% 90.6 (H) 38.0 - 73.0 %    Lymph% 4.7 (L) 18.0 - 48.0 %    Mono% 4.7 4.0 - 15.0 %    Eosinophil% 0.0 0.0 - 8.0 %    Basophil% 0.0 0.0 - 1.9 %    Differential Method Automated    Comprehensive metabolic panel   Result Value Ref Range    Sodium 141 136 - 145 mmol/L    Potassium 4.1 3.5 - 5.1 mmol/L    Chloride 110 95 - 110 mmol/L    CO2 17 (L) 23 - 29 mmol/L    Glucose 336 (H) 70 - 110 mg/dL    BUN, Bld 69 (H) 8 - 23 mg/dL    Creatinine 4.1 (H) 0.5 - 1.4 mg/dL    Calcium 9.2 8.7 - 10.5 mg/dL    Total Protein 7.2 6.0 - 8.4 g/dL    Albumin 3.4 (L) 3.5 - 5.2 g/dL    Total Bilirubin 0.8 0.1 - 1.0 mg/dL    Alkaline Phosphatase 84 55 - 135 U/L    AST 25 10 - 40 U/L    ALT 13 10 - 44 U/L    Anion Gap 14 8 - 16 mmol/L    eGFR if African American 12 (A) >60 mL/min/1.73 m^2    eGFR if non African American 10 (A) >60 mL/min/1.73 m^2   Troponin I #1   Result Value Ref Range    Troponin I 4.417 (H) 0.000 - 0.026 ng/mL   B-Type natriuretic peptide (BNP)   Result Value Ref Range    BNP 1,613 (H) 0 - 99 pg/mL   Lactic acid, plasma   Result Value Ref Range    Lactate (Lactic Acid) 2.7 (H) 0.5 - 2.2 mmol/L   Procalcitonin   Result Value Ref Range    Procalcitonin 0.66 (H) <0.25 ng/mL   Protime-INR   Result Value Ref Range    Prothrombin Time 10.1 9.0 - 12.5 sec    INR 0.9 0.8 -  1.2   APTT   Result Value Ref Range    aPTT 23.9 21.0 - 32.0 sec   Urinalysis, Reflex to Urine Culture Urine, Catheterized   Result Value Ref Range    Specimen UA Urine, Catheterized     Color, UA Yellow Yellow, Straw, Hanane    Appearance, UA Clear Clear    pH, UA 6.0 5.0 - 8.0    Specific Gravity, UA 1.020 1.005 - 1.030    Protein, UA 2+ (A) Negative    Glucose, UA 1+ (A) Negative    Ketones, UA Negative Negative    Bilirubin (UA) Negative Negative    Occult Blood UA 1+ (A) Negative    Nitrite, UA Negative Negative    Urobilinogen, UA Negative <2.0 EU/dL    Leukocytes, UA Negative Negative   Lactic acid, plasma   Result Value Ref Range    Lactate (Lactic Acid) 1.5 0.5 - 2.2 mmol/L   Urinalysis Microscopic   Result Value Ref Range    RBC, UA 1 0 - 4 /hpf    WBC, UA 1 0 - 5 /hpf    Bacteria, UA Few (A) None-Occ /hpf    Hyaline Casts, UA 0 0-1/lpf /lpf    Amorphous, UA Few None-Moderate    Microscopic Comment SEE COMMENT    ISTAT PROCEDURE   Result Value Ref Range    POC PH 7.324 (L) 7.35 - 7.45    POC PCO2 31.5 (L) 35 - 45 mmHg    POC PO2 144 (H) 80 - 100 mmHg    POC HCO3 16.4 (L) 24 - 28 mmol/L    POC BE -10 -2 to 2 mmol/L    POC SATURATED O2 99 95 - 100 %    Rate 10     Sample ARTERIAL     Site LR     Allens Test Pass     DelSys CPAP/BiPAP     Mode BiPAP     FiO2 100     IP 12     EP 6       All pertinent labs within the past 24 hours have been reviewed.    Significant Imaging:   Imaging Results          X-Ray Chest AP Portable (Final result)  Result time 11/26/18 17:53:35    Final result by Sebastien Pettit III, MD (11/26/18 17:53:35)                 Impression:      Probable bilateral perihilar edema/congestion although cannot exclude pneumonia.  Follow-up recommended      Electronically signed by: Sebastien Pettit MD  Date:    11/26/2018  Time:    17:53             Narrative:    EXAMINATION:  XR CHEST AP PORTABLE    CLINICAL HISTORY:  Shortness of breath    COMPARISON:  May 2017    FINDINGS:  The heart size  is normal.  Extensive bilateral infiltrates greatest in the perihilar Artem greater on the left.  This may be due to pulmonary edema, asymmetric however bilateral pneumonia cannot be excluded.  Follow-up recommended.                               I have reviewed all pertinent imaging results/findings within the past 24 hours.     EKG: (personally reviewed)  Normal sinus rhythm, ST depression inferiorly.  No previous to compare.

## 2018-11-27 NOTE — ASSESSMENT & PLAN NOTE
- Initial troponin 4.417, EKG with inferior ST depression (no previous to compare).  Chest pain free.  - Trend serial cardiac enzymes   - EKG showed NSR with ST wave abnormality  - 2D echo results reviewed   - FLP.  - Continue ASA and high-intensity statin.  Will add BB.  - Continue IV heparin drip per ACS protocol.  - Cardiology following.  - [facility] planned for am

## 2018-11-27 NOTE — CONSULTS
[facility] -   Critical Care Medicine  Consult Note    Patient Name: Avril Monzon  MRN: [id number]  Admission Date: 11/26/2018  Hospital Length of Stay: 1 days  Code Status: Full Code  Attending Physician: Zi Landon MD   Primary Care Provider: Rose Parks MD   Principal Problem: NSTEMI (non-ST elevated myocardial infarction)      Subjective:     HPI:  73-year-old female patient presenting for 3 days history of worsening shortness of breath, chest congestion, coughing, and low-grade fever.  She felt she had upper respiratory infection about 3 days ago.    Forty-five pack year smoking history quit in 1990s.    Does not have oxygen or NIV at home.    Hospital/ICU Course:  Patient was found hypoxemic 82% on admission, needed BiPAP, currently on Vapotherm 50% FiO2 and 25 liter/minute of O2 flow.  Troponin elevated, BNP elevated, chest x-ray shows pulmonary congestion.  On IV Lasix -1 0.3 L.    Past Medical History:   Diagnosis Date    Acute hypoxemic respiratory failure 11/26/2018    Anemia     Arthritis     back, hand, knees    Back pain     Cataract     CKD stage G4/A3, GFR 15 - 29 and albumin creatinine ratio >300 mg/g     GFR 40% Jan 2014 and 33% ub 3/2014 ([facility])    Diabetic retinopathy     DM (diabetes mellitus) type II controlled, neurological manifestation     GERD (gastroesophageal reflux disease)     Glaucoma     Hyperlipidemia     Hypertension     NSTEMI (non-ST elevated myocardial infarction) 11/27/2018    Peripheral neuropathy     Polyneuropathy     Proteinuria     >6 mo     Seizures     [facility] 1/2014 -dick CT NRI showed small vessel    Stroke 2012,2014    Tobacco dependence     Type 2 diabetes with peripheral circulatory disorder, controlled        Past Surgical History:   Procedure Laterality Date    BREAST MASS EXCISION      ,benign, age 15.    HYSTERECTOMY  1982    wrist cyst Right 1980s    dorsal wrist cyst       Review of patient's allergies indicates:    Allergen Reactions    Codeine Swelling    Darvon [propoxyphene] Swelling       Family History     Problem Relation (Age of Onset)    Cancer Maternal Grandmother    Diabetes Mother    Heart disease Mother    Hyperlipidemia Mother    Hypertension Mother    Muscular dystrophy Son        Tobacco Use    Smoking status: Former Smoker     Packs/day: 1.50     Years: 30.00     Pack years: 45.00     Types: Cigarettes     Last attempt to quit: 1990     Years since quittin.9    Smokeless tobacco: Former User   Substance and Sexual Activity    Alcohol use: No     Alcohol/week: 0.0 oz    Drug use: No    Sexual activity: No         Review of Systems   Constitutional: Positive for chills, fatigue and unexpected weight change.   HENT: Positive for congestion.    Eyes: Negative for discharge.   Respiratory: Positive for cough, chest tightness and shortness of breath.    Cardiovascular: Positive for leg swelling.   Gastrointestinal: Negative for abdominal pain and blood in stool.   Endocrine: Negative for polyphagia.        Diabetes mellitus     Genitourinary: Negative for hematuria.   Musculoskeletal: Positive for arthralgias and myalgias.   Skin: Negative for wound.   Allergic/Immunologic: Negative for immunocompromised state.   Neurological: Negative for seizures.        History of stroke  History of seizure  Polyneuropathy   Hematological: Negative for adenopathy.   Psychiatric/Behavioral: The patient is nervous/anxious.      Objective:     Vital Signs (Most Recent):  Temp: 98.3 °F (36.8 °C) (18 0642)  Pulse: 77 (18 1301)  Resp: (!) 30 (18 1301)  BP: (!) 112/55 (18 1301)  SpO2: 95 % (18 1301) Vital Signs (24h Range):  Temp:  [97.7 °F (36.5 °C)-98.7 °F (37.1 °C)] 98.3 °F (36.8 °C)  Pulse:  [] 77  Resp:  [17-39] 30  SpO2:  [82 %-100 %] 95 %  BP: (108-181)/(54-88) 112/55     Weight: 78 kg (171 lb 14.4 oz)  Body mass index is 28.61 kg/m².      Intake/Output Summary (Last 24 hours)  at 11/27/2018 1327  Last data filed at 11/27/2018 0638  Gross per 24 hour   Intake 19.62 ml   Output 1375 ml   Net -1355.38 ml       Physical Exam   Constitutional: She is oriented to person, place, and time. She appears well-developed and well-nourished. No distress.   HENT:   Head: Normocephalic and atraumatic.   On Vapotherm   Eyes: EOM are normal.   Neck: Neck supple.   Cardiovascular: Normal rate and regular rhythm.   Pulmonary/Chest: She has rales.   Tachypnea   Abdominal: Soft. There is no tenderness.   Genitourinary:   Genitourinary Comments: Pfeiffer in place   Musculoskeletal: She exhibits edema.   Trace edema bilateral lower extremity   Neurological: She is alert and oriented to person, place, and time.   Skin: Skin is warm.   Psychiatric: She has a normal mood and affect. Her behavior is normal. Judgment and thought content normal.   Nursing note and vitals reviewed.      Vents:  Oxygen Concentration (%): 60 (11/27/18 1301)    Lines/Drains/Airways     Drain                 Urethral Catheter 11/26/18 1823 Latex 16 Fr. less than 1 day          Peripheral Intravenous Line                 Peripheral IV - Single Lumen 11/26/18 1806 Right Hand less than 1 day         Peripheral IV - Single Lumen 11/26/18 1916 Right Antecubital less than 1 day                Significant Labs:    CBC/Anemia Profile:  Recent Labs   Lab 11/26/18 1807 11/27/18  0546   WBC 10.37 9.61   HGB 10.1* 8.2*   HCT 31.3* 25.3*    186   MCV 94 95   RDW 13.2 13.2        Chemistries:  Recent Labs   Lab 11/26/18 1807 11/27/18  0546    143   K 4.1 4.1    109   CO2 17* 21*   BUN 69* 68*   CREATININE 4.1* 4.0*   CALCIUM 9.2 8.6*   ALBUMIN 3.4*  --    PROT 7.2  --    BILITOT 0.8  --    ALKPHOS 84  --    ALT 13  --    AST 25  --      Troponin 15    Blood culture negative so far    Significant Imaging:   CXR: I have reviewed all pertinent results/findings within the past 24 hours and my personal findings are:  Bilateral pulmonary  edema        Echo    1 - Wall motion abnormalities.     2 - Mildly depressed left ventricular systolic function (EF 45-50%).     3 - Impaired LV relaxation, normal LAP (grade 1 diastolic dysfunction).     4 - Normal right ventricular systolic function .     5 - Pulmonary hypertension. The estimated PA systolic pressure is 69 mmHg.     6 - Mild to moderate mitral regurgitation.     7 - Mild tricuspid regurgitation.     8 - Intermediate central venous pressure.     9 - Right pleural effusion.      Assessment/Plan:     Pulmonary   Acute hypoxemic respiratory failure secondary to acute decompensated HF    Strict I&Os, IV Lasix 80 mg q.12 hours.  Repeat chest x-ray in a.m.  Vapotherm keep O2 sat above 89%, BiPAP p.r.n. distress     Cardiac/Vascular   * NSTEMI (non-ST elevated myocardial infarction)    Cardiac monitoring  IV heparin, aspirin , statin , beta-blocker, cardiology follow-up     Renal/   GREGG on CKD, stage IV    P.o. bicarb.  Strict I&Os.  Monitor creatinine bicarb level.     Endocrine   Controlled type 2 diabetes mellitus with diabetic polyneuropathy, with long-term current use of insulin    Fingerstick q.6 hours.  SSI.         Critical Care Daily Checklist:    A: Awake: RASS Goal/Actual Goal:   awake avoid over sedation  Actual:     B: Spontaneous Breathing Trial Performed?     C: SAT & SBT Coordinated?  Not applicable                      D: Delirium: CAM-ICU     E: Early Mobility Performed? Yes   F: Feeding Goal:    Status:     Current Diet Order   Procedures    Diet diabetic [facility]; 1500 Calorie     Order Specific Question:   Indicate patient location for additional diet options:     Answer:   [facility]     Order Specific Question:   Total calories:     Answer:   1500 Calorie      AS: Analgesia/Sedation Avoid over sedation   T: Thromboembolic Prophylaxis On IV heparin for non STEMI   H: HOB > 300 Yes   U: Stress Ulcer Prophylaxis (if needed) PPI   G: Glucose Control Fingerstick q.6  hours SSI   B: Bowel Function     I: Indwelling Catheter (Lines & Pfeiffer) Necessity Keep Pfeiffer critically ill strict I&Os   D: De-escalation of Antimicrobials/Pharmacotherapies No antibiotic    Plan for the day/ETD Y    Code Status:  Family/Goals of Care: Full Code  Y     Critical Care Time: 40 minutes  Critical secondary to Patient has a condition that poses threat to life and bodily function: Acute Myocardial Infarction     Critical care was time spent personally by me on the following activities: development of treatment plan with patient or surrogate and bedside caregivers, discussions with consultants, evaluation of patient's response to treatment, examination of patient, ordering and performing treatments and interventions, ordering and review of laboratory studies, ordering and review of radiographic studies, pulse oximetry, re-evaluation of patient's condition. This critical care time did not overlap with that of any other provider or involve time for any procedures.    Thank you for your consult. I will follow-up with patient. Please contact us if you have any additional questions.     Hattie Stephens MD  Critical Care Medicine  [facility] [place]

## 2018-11-27 NOTE — ASSESSMENT & PLAN NOTE
- BP stable.  - Resume home hydralazine and amlodipine.    -Continue BB and diuresis as above.  - ACEi on hold

## 2018-11-27 NOTE — ED NOTES
Spoke with pt's daughter, Maira, who states she will be able to bring pt's calcifediol in the morning for daily admin. Pharmacy notified.

## 2018-11-27 NOTE — ED NOTES
Per Dr Pena, consult respiratory to remove pt from bipap and try vapotherm. Notified respiratory at this time.

## 2018-11-27 NOTE — ASSESSMENT & PLAN NOTE
Strict I&Os, IV Lasix 80 mg q.12 hours.  Repeat chest x-ray in a.m.  Vapotherm keep O2 sat above 89%, BiPAP p.r.n. distress

## 2018-11-27 NOTE — ED NOTES
Spoke with pharmacy who states this facility does not carry pt's prescribed medication calcifediol and asks that I contact family to bring home med for daily admin.

## 2018-11-27 NOTE — ASSESSMENT & PLAN NOTE
- Likely cardiorenal syndrome.  - Diuresis continued  - strict I&O's.  - Avoid nephrotoxic agents.  Hold home ACEi.  - Nephrology following   - Cardiac Cath planned for tomorrow morning  - baseline creatinine 2.5-3

## 2018-11-27 NOTE — ED NOTES
Reviewed POC with pt, pt verbalized understanding. VSS on bipap, respiratory consulted to eval for possible vapotherm. Pt denies chest pain/SOB/nausea/pain. IV gtt infusing per MAR. Pfeiffer care performed. Call light within reach, bed in lowest position, WCTM.

## 2018-11-27 NOTE — ED NOTES
Prior to my arrival in room. Pt was reported to be is resp distress. Pt appears calm at this time on bipap and tolerating bipap well

## 2018-11-27 NOTE — H&P (VIEW-ONLY)
"[facility]  Cardiology  Consult Note    Patient Name: Avril Monzon  MRN: [id number]  Admission Date: 11/26/2018  Hospital Length of Stay: 1 days  Code Status: Full Code   Attending Provider: Zi Landon MD   Consulting Provider: Moriah Tran PA-C  Primary Care Physician: Rose Parks MD  Principal Problem:NSTEMI (non-ST elevated myocardial infarction)    Patient information was obtained from patient, past medical records and ER records.     Inpatient consult to Cardiology  Consult performed by: Moriah Tran PA-C  Consult ordered by: Lizzette Holder MD        Subjective:     Chief Complaint:  NSTEMI/CHF    HPI:   Ms. Monzon is a 73 year old female patient with a PMHx of HTN, hyperlipidemia, DM type II, former tobacco abuse, CKD, anemia, and CVA who presented to [facility] ED last night with a chief complaint of progressively worsening SOB over the past 2-3 days. Patient stated she began to feel like she was coming down with a "cold or sinus infection" on Friday and developed a persistent cough when lying down. Associated symptoms included fatigue and subjective fever. Patient denied any associated ministerio chest pain, palpitations, weight gain, nausea, vomiting, diaphoresis, near syncope, or syncope. Initial workup in ED revealed creatinine of 4.1, BNP of 1613, troponin of 4.4, and lactic acid of 2.7. CXR showed evidence of fluid overload. EKG showed inferior ST depression and patient subsequently admitted for further evaluation and treatment. Cardiology consulted to assist with management. Patient seen and examined today while in ED. States SOB is improving. Remains chest pain free. Denies any prior cardiac history-no known CAD or history of MI. Patient reports compliance with her medications. Chart reviewed. Troponin 4417>15.866>16.308. Repeat EKG this AM showed diffuse ST depression in anterior leads. 2D echo showed EF of 45-50%, +WMA, pulmonary HTN, and mild to moderate "     Past Medical History:   Diagnosis Date    Acute hypoxemic respiratory failure 11/26/2018    Anemia     Arthritis     back, hand, knees    Back pain     Cataract     CKD stage G4/A3, GFR 15 - 29 and albumin creatinine ratio >300 mg/g     GFR 40% Jan 2014 and 33% ub 3/2014 ([facility])    Diabetic retinopathy     DM (diabetes mellitus) type II controlled, neurological manifestation     GERD (gastroesophageal reflux disease)     Glaucoma     Hyperlipidemia     Hypertension     NSTEMI (non-ST elevated myocardial infarction) 11/27/2018    Peripheral neuropathy     Polyneuropathy     Proteinuria     >6 mo     Seizures     [facility] 1/2014 -dick CT NRI showed small vessel    Stroke 2012,2014    Tobacco dependence     Type 2 diabetes with peripheral circulatory disorder, controlled        Past Surgical History:   Procedure Laterality Date    BREAST MASS EXCISION      ,benign, age 15.    HYSTERECTOMY  1982    wrist cyst Right 1980s    dorsal wrist cyst       Review of patient's allergies indicates:   Allergen Reactions    Codeine Swelling    Darvon [propoxyphene] Swelling       No current facility-administered medications on file prior to encounter.      Current Outpatient Medications on File Prior to Encounter   Medication Sig    amLODIPine (NORVASC) 10 MG tablet TAKE ONE TABLET BY MOUTH ONCE DAILY    atorvastatin (LIPITOR) 80 MG tablet Take 1 tablet (80 mg total) by mouth once daily.    blood sugar diagnostic Strp 1 strip by Misc.(Non-Drug; Combo Route) route 3 (three) times daily. relion ultima    calcifediol (RAYALDEE) 30 mcg Cs24 Take 30 mcg by mouth once daily.    hydrALAZINE (APRESOLINE) 50 MG tablet Take 1 tablet (50 mg total) by mouth 2 (two) times daily.    insulin NPH-insulin regular, 70/30, (NOVOLIN 70/30) 100 unit/mL (70-30) injection INJECT SUBCUTANEOUSLY 30 UNITS IN THE MORNING AND INJECT 25 UNITS IN THE EVENING (Patient taking differently: INJECT SUBCUTANEOUSLY 30 UNITS IN THE MORNING  AND INJECT 25 UNITS IN THE EVENING)    lancets (ACCU-CHEK SOFTCLIX LANCETS) Misc 1 lancet by Misc.(Non-Drug; Combo Route) route 3 (three) times daily.    lisinopril (PRINIVIL,ZESTRIL) 20 MG tablet Take 2 tablets (40 mg total) by mouth once daily.    sodium bicarbonate 325 MG tablet TAKE ONE TABLET BY MOUTH THREE TIMES DAILY    torsemide (DEMADEX) 20 MG Tab Take 1 tablet (20 mg total) by mouth once daily.     Family History     Problem Relation (Age of Onset)    Cancer Maternal Grandmother    Diabetes Mother    Heart disease Mother    Hyperlipidemia Mother    Hypertension Mother    Muscular dystrophy Son        Tobacco Use    Smoking status: Former Smoker     Packs/day: 1.50     Years: 30.00     Pack years: 45.00     Types: Cigarettes     Last attempt to quit: 1990     Years since quittin.9    Smokeless tobacco: Former User   Substance and Sexual Activity    Alcohol use: No     Alcohol/week: 0.0 oz    Drug use: No    Sexual activity: No     Review of Systems   Constitution: Positive for fever (subjective), weakness and malaise/fatigue.   HENT: Positive for congestion.    Eyes: Negative.    Cardiovascular: Positive for orthopnea.   Respiratory: Positive for cough, shortness of breath and sleep disturbances due to breathing.    Endocrine: Negative.    Hematologic/Lymphatic: Negative.    Skin: Negative.    Musculoskeletal: Negative.    Gastrointestinal: Negative.    Genitourinary: Negative.    Psychiatric/Behavioral: Negative.    Allergic/Immunologic: Negative.      Objective:     Vital Signs (Most Recent):  Temp: 98.3 °F (36.8 °C) (18 0642)  Pulse: 77 (18 1301)  Resp: (!) 30 (18 1301)  BP: (!) 112/55 (18 1301)  SpO2: 95 % (18 1301) Vital Signs (24h Range):  Temp:  [97.7 °F (36.5 °C)-98.7 °F (37.1 °C)] 98.3 °F (36.8 °C)  Pulse:  [] 77  Resp:  [17-39] 30  SpO2:  [82 %-100 %] 95 %  BP: (108-181)/(54-88) 112/55     Weight: 78 kg (171 lb 14.4 oz)  Body mass index is  28.61 kg/m².    SpO2: 95 %  O2 Device (Oxygen Therapy): Vapotherm      Intake/Output Summary (Last 24 hours) at 11/27/2018 1313  Last data filed at 11/27/2018 0638  Gross per 24 hour   Intake 19.62 ml   Output 1375 ml   Net -1355.38 ml       Lines/Drains/Airways     Drain                 Urethral Catheter 11/26/18 1823 Latex 16 Fr. less than 1 day          Peripheral Intravenous Line                 Peripheral IV - Single Lumen 11/26/18 1806 Right Hand less than 1 day         Peripheral IV - Single Lumen 11/26/18 1916 Right Antecubital less than 1 day                Physical Exam   Constitutional: She is oriented to person, place, and time. She appears well-developed and well-nourished. No distress.   HENT:   Head: Normocephalic and atraumatic.   Eyes: Pupils are equal, round, and reactive to light. Right eye exhibits no discharge. Left eye exhibits no discharge.   Neck: Neck supple. JVD present. No thyromegaly present.   Cardiovascular: Normal rate, regular rhythm, S1 normal and S2 normal.   No murmur heard.  Pulmonary/Chest: Effort normal. She has rales.   Diminished BS right base   Abdominal: Soft. She exhibits no distension. There is no rebound.   Musculoskeletal: She exhibits edema.   Neurological: She is alert and oriented to person, place, and time.   Skin: Skin is warm and dry. She is not diaphoretic. No erythema.   Psychiatric: She has a normal mood and affect. Her behavior is normal. Thought content normal.   Nursing note and vitals reviewed.      Significant Labs:   CMP   Recent Labs   Lab 11/26/18 1807 11/27/18  0546    143   K 4.1 4.1    109   CO2 17* 21*   * 241*   BUN 69* 68*   CREATININE 4.1* 4.0*   CALCIUM 9.2 8.6*   PROT 7.2  --    ALBUMIN 3.4*  --    BILITOT 0.8  --    ALKPHOS 84  --    AST 25  --    ALT 13  --    ANIONGAP 14 13   ESTGFRAFRICA 12* 12*   EGFRNONAA 10* 11*   , CBC   Recent Labs   Lab 11/26/18 1807 11/27/18  0546   WBC 10.37 9.61   HGB 10.1* 8.2*   HCT 31.3*  25.3*    186   , Troponin   Recent Labs   Lab 11/26/18  1807 11/26/18  2359 11/27/18  0546   TROPONINI 4.417* 15.866* 16.308*    and All pertinent lab results from the last 24 hours have been reviewed.    Significant Imaging: Echocardiogram:   2D echo with color flow doppler:   Results for orders placed or performed during the hospital encounter of 11/26/18   2D echo with color flow doppler   Result Value Ref Range    QEF 45 55 - 65    Mitral Valve Regurgitation MILD TO MODERATE     Diastolic Dysfunction Yes (A)     Est. PA Systolic Pressure 69.15 (A)     Tricuspid Valve Regurgitation MILD    , EKG: Reviewed and X-Ray: CXR: X-Ray Chest 1 View (CXR): No results found for this visit on 11/26/18. and X-Ray Chest PA and Lateral (CXR): No results found for this visit on 11/26/18.    Assessment and Plan:   Patient who presents with decompensated CHF/respiratory failure in setting of NSTEMI. Continue diuresis. Meds further optimized. Needs R/LHC once more stable.    * NSTEMI (non-ST elevated myocardial infarction)    -Patient who presented in fulminant pulmonary edema/decompensated CHF in setting of MI  -Troponin 4.417>15.866>16.308  -EKG with ischemic changes anteriorly this AM  -2D echo showed EF of 45-50% with WMA  -Needs IV diuresis/optimization of medical therapy  -Continue ASA, amlodipine, statin, hydralazine, lopressor  -Add oral nitrates  -Continue heparin gtt for anticoagulation  -Will need R/LHC once optimized. Patient is at high risk of contrast-induced nephropathy/transition to dialysis. All risks, benefits, and treatment alternatives explained to patient in detail.  -Nephrology on board, appreciate assistance      Hyperlipidemia    -Continue statin  -Check lipid panel     Essential hypertension    -Continue amlodipine, Lopressor, hydralazine  -Add Isosorbide mononitrate  -Monitor     Acute hypoxemic respiratory failure secondary to acute decompensated HF    -Patient who presents with acute decompensated  CHF secondary to NSTEMI/MI  -Needs IV diuresis  -Strict I's/O's  -Assess response  -Continue other meds  -No ACEI/ARB given underlying renal function  -Nephrology on board         VTE Risk Mitigation (From admission, onward)        Ordered     Place sequential compression device  Until discontinued      11/27/18 0032     heparin 25,000 units in dextrose 5% 250 mL (100 units/mL) infusion LOW INTENSITY nomogram - [facility]  Continuous      11/26/18 1905     heparin 25,000 units in dextrose 5% (100 units/ml) IV bolus from bag - ADDITIONAL PRN BOLUS - 60 units/kg (max bolus 4000 units)  As needed (PRN)      11/26/18 1905     heparin 25,000 units in dextrose 5% (100 units/ml) IV bolus from bag - ADDITIONAL PRN BOLUS - 30 units/kg (max bolus 4000 units)  As needed (PRN)      11/26/18 1905          Thank you for your consult. I will follow-up with patient. Please contact us if you have any additional questions.    Moriah Tran PA-C  Cardiology   [facility]     Chart reviewed. Dr. Holman examined patient and agrees with plan as outlined above.

## 2018-11-27 NOTE — HOSPITAL COURSE
Patient was found hypoxemic 82% on admission, needed BiPAP, currently on Vapotherm 50% FiO2 and 25 liter/minute of O2 flow.  Troponin elevated, BNP elevated, chest x-ray shows pulmonary congestion.  On IV Lasix -1 0.3 L.  11/27 patient seen and examined, T-max 101° last 24 hr, on oxygen via Vapotherm, -2 L yesterday  11/29 patient seen and examined. Tmax 100 . On O2 5 liters , sat 92% , negative 2.7 liters , on heparin gtt , no distress   12/1 seen and examined.  Afebrile.  Status post cardiac catheterization yesterday.  Severe CAD.

## 2018-11-27 NOTE — H&P
[facility] Medicine  History & Physical    Patient Name: Avril oMnzon  MRN: [id number]  Admission Date: 11/26/2018  Attending Physician: Rashad Pena MD   Primary Care Provider: Rose Parks MD         Patient information was obtained from patient, relative(s), EMS personnel, past medical records and ER records.     Subjective:     Principal Problem:NSTEMI (non-ST elevated myocardial infarction)    Chief Complaint:   Chief Complaint   Patient presents with    Shortness of Breath        HPI: Ms. Monzon is a 74 yo female with a PMHx of HTN, HLD, DM II, CKD stage IV, anemia of CKD, and h/o CVA.  She presented to the ED with c/o SOB that has progressively worsened since yesterday.  Associated nonproductive cough and subjective fever.  No aggravating or alleviating factors.  Denies any CP, palpitations, diaphoresis, N/V, orthopnea, PND, edema, weight gain, ABD pain, diarrhea, dysuria, decreased UOP, hematuria, back pain, HA, AMS, lightheadedness, dizziness, syncope, weakness, numbness, or chills.  Per EMS, patient hypoxic on arrival to scene with O2 sat 80% on RA.  She was given Duoneb x 1 and placed on 4L NC with improved O2 sat of 94%.  Upon arrival to ED, patient's O2 sat 82% on 4L NC.  She was placed on BiPAP with increased O2 sat of 100%.  Work-up in ED resulted cr. 4.1, BUN 69, glucose 336, BNP 1613, troponin 4.417, lactic 2.7 > 1.5.  ABG with pH 7.324, pCO2 31.5, pO2 144, HCO3 16.4, BE -10 on BiPAP.  CXR showed pulmonary vascular congestion, pulmonary edema, and small bilateral pleural effusions.  EKG showed normal sinus rhythm with inferior ST depression, no previous to compare.  ASA 325mg, IV lopressor 5mg, and IV Lasix 120mg given per ED.  Case discussed with Cardiology in ED and IV heparin drip initiated.  Hospital Medicine called for admission.  Currently, patient appears comfortable in NAD.  Reports SOB improved on BiPAP.  Denies any CP.  No h/o CAD.  Denies having stress  test in the past.  2D echo in 6/2018 showed LVEF of 60-65%, normal diastolic function, and no significant valvular abnormalities.    Patient is a Full Code.  Patient's daughter Maira is surrogate decision maker [phone].      Past Medical History:   Diagnosis Date    Acute hypoxemic respiratory failure 11/26/2018    Anemia     Arthritis     back, hand, knees    Back pain     Cataract     CKD stage G4/A3, GFR 15 - 29 and albumin creatinine ratio >300 mg/g     GFR 40% Jan 2014 and 33% ub 3/2014 ([facility])    Diabetic retinopathy     DM (diabetes mellitus) type II controlled, neurological manifestation     GERD (gastroesophageal reflux disease)     Glaucoma     Hyperlipidemia     Hypertension     Peripheral neuropathy     Polyneuropathy     Proteinuria     >6 mo     Seizures     [facility] 1/2014 -dick CT NRI showed small vessel    Stroke 2012,2014    Tobacco dependence     Type 2 diabetes with peripheral circulatory disorder, controlled        Past Surgical History:   Procedure Laterality Date    BREAST MASS EXCISION      ,benign, age 15.    HYSTERECTOMY  1982    wrist cyst Right 1980s    dorsal wrist cyst       Review of patient's allergies indicates:   Allergen Reactions    Codeine Swelling    Darvon [propoxyphene] Swelling       No current facility-administered medications on file prior to encounter.      Current Outpatient Medications on File Prior to Encounter   Medication Sig    amLODIPine (NORVASC) 10 MG tablet TAKE ONE TABLET BY MOUTH ONCE DAILY    atorvastatin (LIPITOR) 80 MG tablet Take 1 tablet (80 mg total) by mouth once daily.    blood sugar diagnostic Strp 1 strip by Misc.(Non-Drug; Combo Route) route 3 (three) times daily. relion ultima    calcifediol (RAYALDEE) 30 mcg Cs24 Take 30 mcg by mouth once daily.    hydrALAZINE (APRESOLINE) 50 MG tablet Take 1 tablet (50 mg total) by mouth 2 (two) times daily.    insulin NPH-insulin regular, 70/30, (NOVOLIN 70/30) 100 unit/mL (70-30)  injection INJECT SUBCUTANEOUSLY 30 UNITS IN THE MORNING AND INJECT 25 UNITS IN THE EVENING (Patient taking differently: INJECT SUBCUTANEOUSLY 30 UNITS IN THE MORNING AND INJECT 25 UNITS IN THE EVENING)    lancets (ACCU-CHEK SOFTCLIX LANCETS) Misc 1 lancet by Misc.(Non-Drug; Combo Route) route 3 (three) times daily.    lisinopril (PRINIVIL,ZESTRIL) 20 MG tablet Take 2 tablets (40 mg total) by mouth once daily.    sodium bicarbonate 325 MG tablet TAKE ONE TABLET BY MOUTH THREE TIMES DAILY    torsemide (DEMADEX) 20 MG Tab Take 1 tablet (20 mg total) by mouth once daily.     Family History     Problem Relation (Age of Onset)    Cancer Maternal Grandmother    Diabetes Mother    Heart disease Mother    Hyperlipidemia Mother    Hypertension Mother    Muscular dystrophy Son        Tobacco Use    Smoking status: Former Smoker     Packs/day: 1.50     Years: 30.00     Pack years: 45.00     Types: Cigarettes     Last attempt to quit: 1990     Years since quittin.9    Smokeless tobacco: Former User   Substance and Sexual Activity    Alcohol use: No     Alcohol/week: 0.0 oz    Drug use: No    Sexual activity: No     Review of Systems   Constitutional: Positive for fever (subjective). Negative for chills, diaphoresis, fatigue and unexpected weight change.   HENT: Negative for congestion, sore throat and trouble swallowing.    Respiratory: Positive for cough and shortness of breath. Negative for chest tightness and wheezing.    Cardiovascular: Negative for chest pain, palpitations and leg swelling.   Gastrointestinal: Negative for abdominal distention, abdominal pain, blood in stool, constipation, diarrhea, nausea and vomiting.   Genitourinary: Negative for difficulty urinating, dysuria, frequency, hematuria and urgency.   Musculoskeletal: Negative for arthralgias, back pain and myalgias.   Skin: Negative for pallor, rash and wound.   Neurological: Negative for dizziness, syncope, weakness, light-headedness and  headaches.   Psychiatric/Behavioral: Negative for confusion. The patient is not nervous/anxious.    All other systems reviewed and are negative.    Objective:     Vital Signs (Most Recent):  Temp: 98 °F (36.7 °C) (11/26/18 2359)  Pulse: 79 (11/26/18 2359)  Resp: (!) 32 (11/26/18 2359)  BP: 133/63 (11/26/18 2359)  SpO2: 100 % (11/26/18 2359) Vital Signs (24h Range):  Temp:  [97.7 °F (36.5 °C)-98.7 °F (37.1 °C)] 98 °F (36.7 °C)  Pulse:  [] 79  Resp:  [23-39] 32  SpO2:  [82 %-100 %] 100 %  BP: (133-181)/(63-88) 133/63     Weight: 78 kg (171 lb 14.4 oz)  Body mass index is 28.61 kg/m².    Physical Exam   Constitutional: She is oriented to person, place, and time. She appears well-developed and well-nourished. No distress.   HENT:   Head: Normocephalic and atraumatic.   Eyes: Conjunctivae are normal.   PERRL; EOM intact.   Neck: Normal range of motion. Neck supple. JVD present.   Cardiovascular: Normal rate, regular rhythm, S1 normal, S2 normal and intact distal pulses.  No extrasystoles are present. Exam reveals no gallop and no friction rub.   No murmur heard.  Pulses:       Radial pulses are 2+ on the right side, and 2+ on the left side.        Dorsalis pedis pulses are 2+ on the right side, and 2+ on the left side.        Posterior tibial pulses are 2+ on the right side, and 2+ on the left side.   Pulmonary/Chest: Effort normal. No accessory muscle usage. Tachypnea noted. No respiratory distress. She has no wheezes. She has no rhonchi. She has rales in the right lower field and the left lower field.   BiPAP in place.   Abdominal: Soft. Bowel sounds are normal. She exhibits no distension. There is no tenderness. There is no rebound, no guarding and no CVA tenderness.   Musculoskeletal: Normal range of motion. She exhibits no edema, tenderness or deformity.   Neurological: She is alert and oriented to person, place, and time. No cranial nerve deficit or sensory deficit.   Skin: Skin is warm, dry and intact.  Capillary refill takes less than 2 seconds. No rash noted. She is not diaphoretic. No cyanosis or erythema.   Psychiatric: She has a normal mood and affect. Her speech is normal and behavior is normal. Cognition and memory are normal.   Nursing note and vitals reviewed.          Significant Labs:   Results for orders placed or performed during the hospital encounter of 11/26/18   CBC auto differential   Result Value Ref Range    WBC 10.37 3.90 - 12.70 K/uL    RBC 3.32 (L) 4.00 - 5.40 M/uL    Hemoglobin 10.1 (L) 12.0 - 16.0 g/dL    Hematocrit 31.3 (L) 37.0 - 48.5 %    MCV 94 82 - 98 fL    MCH 30.4 27.0 - 31.0 pg    MCHC 32.3 32.0 - 36.0 g/dL    RDW 13.2 11.5 - 14.5 %    Platelets 223 150 - 350 K/uL    MPV 10.1 9.2 - 12.9 fL    Gran # (ANC) 9.4 (H) 1.8 - 7.7 K/uL    Lymph # 0.5 (L) 1.0 - 4.8 K/uL    Mono # 0.5 0.3 - 1.0 K/uL    Eos # 0.0 0.0 - 0.5 K/uL    Baso # 0.00 0.00 - 0.20 K/uL    Gran% 90.6 (H) 38.0 - 73.0 %    Lymph% 4.7 (L) 18.0 - 48.0 %    Mono% 4.7 4.0 - 15.0 %    Eosinophil% 0.0 0.0 - 8.0 %    Basophil% 0.0 0.0 - 1.9 %    Differential Method Automated    Comprehensive metabolic panel   Result Value Ref Range    Sodium 141 136 - 145 mmol/L    Potassium 4.1 3.5 - 5.1 mmol/L    Chloride 110 95 - 110 mmol/L    CO2 17 (L) 23 - 29 mmol/L    Glucose 336 (H) 70 - 110 mg/dL    BUN, Bld 69 (H) 8 - 23 mg/dL    Creatinine 4.1 (H) 0.5 - 1.4 mg/dL    Calcium 9.2 8.7 - 10.5 mg/dL    Total Protein 7.2 6.0 - 8.4 g/dL    Albumin 3.4 (L) 3.5 - 5.2 g/dL    Total Bilirubin 0.8 0.1 - 1.0 mg/dL    Alkaline Phosphatase 84 55 - 135 U/L    AST 25 10 - 40 U/L    ALT 13 10 - 44 U/L    Anion Gap 14 8 - 16 mmol/L    eGFR if African American 12 (A) >60 mL/min/1.73 m^2    eGFR if non African American 10 (A) >60 mL/min/1.73 m^2   Troponin I #1   Result Value Ref Range    Troponin I 4.417 (H) 0.000 - 0.026 ng/mL   B-Type natriuretic peptide (BNP)   Result Value Ref Range    BNP 1,613 (H) 0 - 99 pg/mL   Lactic acid, plasma   Result Value  Ref Range    Lactate (Lactic Acid) 2.7 (H) 0.5 - 2.2 mmol/L   Procalcitonin   Result Value Ref Range    Procalcitonin 0.66 (H) <0.25 ng/mL   Protime-INR   Result Value Ref Range    Prothrombin Time 10.1 9.0 - 12.5 sec    INR 0.9 0.8 - 1.2   APTT   Result Value Ref Range    aPTT 23.9 21.0 - 32.0 sec   Urinalysis, Reflex to Urine Culture Urine, Catheterized   Result Value Ref Range    Specimen UA Urine, Catheterized     Color, UA Yellow Yellow, Straw, Hanane    Appearance, UA Clear Clear    pH, UA 6.0 5.0 - 8.0    Specific Gravity, UA 1.020 1.005 - 1.030    Protein, UA 2+ (A) Negative    Glucose, UA 1+ (A) Negative    Ketones, UA Negative Negative    Bilirubin (UA) Negative Negative    Occult Blood UA 1+ (A) Negative    Nitrite, UA Negative Negative    Urobilinogen, UA Negative <2.0 EU/dL    Leukocytes, UA Negative Negative   Lactic acid, plasma   Result Value Ref Range    Lactate (Lactic Acid) 1.5 0.5 - 2.2 mmol/L   Urinalysis Microscopic   Result Value Ref Range    RBC, UA 1 0 - 4 /hpf    WBC, UA 1 0 - 5 /hpf    Bacteria, UA Few (A) None-Occ /hpf    Hyaline Casts, UA 0 0-1/lpf /lpf    Amorphous, UA Few None-Moderate    Microscopic Comment SEE COMMENT    ISTAT PROCEDURE   Result Value Ref Range    POC PH 7.324 (L) 7.35 - 7.45    POC PCO2 31.5 (L) 35 - 45 mmHg    POC PO2 144 (H) 80 - 100 mmHg    POC HCO3 16.4 (L) 24 - 28 mmol/L    POC BE -10 -2 to 2 mmol/L    POC SATURATED O2 99 95 - 100 %    Rate 10     Sample ARTERIAL     Site LR     Allens Test Pass     DelSys CPAP/BiPAP     Mode BiPAP     FiO2 100     IP 12     EP 6       All pertinent labs within the past 24 hours have been reviewed.    Significant Imaging:   Imaging Results          X-Ray Chest AP Portable (Final result)  Result time 11/26/18 17:53:35    Final result by Sebastien Pettit III, MD (11/26/18 17:53:35)                 Impression:      Probable bilateral perihilar edema/congestion although cannot exclude pneumonia.  Follow-up  recommended      Electronically signed by: Sebastien Pettit MD  Date:    11/26/2018  Time:    17:53             Narrative:    EXAMINATION:  XR CHEST AP PORTABLE    CLINICAL HISTORY:  Shortness of breath    COMPARISON:  May 2017    FINDINGS:  The heart size is normal.  Extensive bilateral infiltrates greatest in the perihilar Artem greater on the left.  This may be due to pulmonary edema, asymmetric however bilateral pneumonia cannot be excluded.  Follow-up recommended.                               I have reviewed all pertinent imaging results/findings within the past 24 hours.     EKG: (personally reviewed)  Normal sinus rhythm, ST depression inferiorly.  No previous to compare.            Assessment/Plan:     * NSTEMI (non-ST elevated myocardial infarction)    - Initial troponin 4.417, EKG with inferior ST depression (no previous to compare).  Chest pain free.  - Trend serial cardiac enzymes and EKG.  - Check 2D echo, FLP.  - Continue ASA and high-intensity statin.  Will add BB.  - Continue IV heparin drip per ACS protocol.  - Cardiology to eval in AM.     Acute hypoxemic respiratory failure secondary to acute decompensated HF    - O2 sat stable on 60% FiO2 per BiPAP, wean as tolerated.  - IV lasix 120mg given in ED with ~1L diuresed.    - Continue aggressive diuresis with IV Lasix 80mg BID.  - Strict I&O's, daily weights, Na/fluid restriction.  - Repeat 2D echo in AM.  - Cardiology consult in AM.     GREGG on CKD, stage IV    - Likely cardiorenal syndrome.  - Diurese as above, strict I&O's.  - Avoid nephrotoxic agents.  Hold home ACEi.  - Nephrology consult in AM.  Patient needs cardiac cath, ? will need RRT.     Hyperlipidemia    - Continue daily Lipitor 80mg.  - Check FLP.     Essential hypertension    - BP stable.  - Resume home hydralazine and amlodipine.  Continue BB and diuresis as above.  - ACEi on hold as above.     Anemia in stage 4 chronic kidney disease    - H&H stable at baseline.  Follow daily CBC  and transfuse as needed.     Controlled type 2 diabetes mellitus with diabetic polyneuropathy, with long-term current use of insulin    - Accuchecks with moderate SSI.  - Check HbA1c.       VTE Risk Mitigation (From admission, onward)        Ordered     Place sequential compression device  Until discontinued      11/27/18 0032     heparin 25,000 units in dextrose 5% 250 mL (100 units/mL) infusion LOW INTENSITY nomogram - [facility]  Continuous      11/26/18 1905     heparin 25,000 units in dextrose 5% (100 units/ml) IV bolus from bag - ADDITIONAL PRN BOLUS - 60 units/kg (max bolus 4000 units)  As needed (PRN)      11/26/18 1905     heparin 25,000 units in dextrose 5% (100 units/ml) IV bolus from bag - ADDITIONAL PRN BOLUS - 30 units/kg (max bolus 4000 units)  As needed (PRN)      11/26/18 1905             Sona Villeda NP  Department of Hospital Medicine   [facility]

## 2018-11-27 NOTE — SUBJECTIVE & OBJECTIVE
Past Medical History:   Diagnosis Date    Acute hypoxemic respiratory failure 11/26/2018    Anemia     Arthritis     back, hand, knees    Back pain     Cataract     CKD stage G4/A3, GFR 15 - 29 and albumin creatinine ratio >300 mg/g     GFR 40% Jan 2014 and 33% ub 3/2014 ([facility])    Diabetic retinopathy     DM (diabetes mellitus) type II controlled, neurological manifestation     GERD (gastroesophageal reflux disease)     Glaucoma     Hyperlipidemia     Hypertension     NSTEMI (non-ST elevated myocardial infarction) 11/27/2018    Peripheral neuropathy     Polyneuropathy     Proteinuria     >6 mo     Seizures     [facility] 1/2014 -dick CT NRI showed small vessel    Stroke 2012,2014    Tobacco dependence     Type 2 diabetes with peripheral circulatory disorder, controlled        Past Surgical History:   Procedure Laterality Date    BREAST MASS EXCISION      ,benign, age 15.    HYSTERECTOMY  1982    wrist cyst Right 1980s    dorsal wrist cyst       Review of patient's allergies indicates:   Allergen Reactions    Codeine Swelling    Darvon [propoxyphene] Swelling       No current facility-administered medications on file prior to encounter.      Current Outpatient Medications on File Prior to Encounter   Medication Sig    amLODIPine (NORVASC) 10 MG tablet TAKE ONE TABLET BY MOUTH ONCE DAILY    atorvastatin (LIPITOR) 80 MG tablet Take 1 tablet (80 mg total) by mouth once daily.    blood sugar diagnostic Strp 1 strip by Misc.(Non-Drug; Combo Route) route 3 (three) times daily. relion ultima    calcifediol (RAYALDEE) 30 mcg Cs24 Take 30 mcg by mouth once daily.    hydrALAZINE (APRESOLINE) 50 MG tablet Take 1 tablet (50 mg total) by mouth 2 (two) times daily.    insulin NPH-insulin regular, 70/30, (NOVOLIN 70/30) 100 unit/mL (70-30) injection INJECT SUBCUTANEOUSLY 30 UNITS IN THE MORNING AND INJECT 25 UNITS IN THE EVENING (Patient taking differently: INJECT SUBCUTANEOUSLY 30 UNITS IN THE MORNING AND  INJECT 25 UNITS IN THE EVENING)    lancets (ACCU-CHEK SOFTCLIX LANCETS) Misc 1 lancet by Misc.(Non-Drug; Combo Route) route 3 (three) times daily.    lisinopril (PRINIVIL,ZESTRIL) 20 MG tablet Take 2 tablets (40 mg total) by mouth once daily.    sodium bicarbonate 325 MG tablet TAKE ONE TABLET BY MOUTH THREE TIMES DAILY    torsemide (DEMADEX) 20 MG Tab Take 1 tablet (20 mg total) by mouth once daily.     Family History     Problem Relation (Age of Onset)    Cancer Maternal Grandmother    Diabetes Mother    Heart disease Mother    Hyperlipidemia Mother    Hypertension Mother    Muscular dystrophy Son        Tobacco Use    Smoking status: Former Smoker     Packs/day: 1.50     Years: 30.00     Pack years: 45.00     Types: Cigarettes     Last attempt to quit: 1990     Years since quittin.9    Smokeless tobacco: Former User   Substance and Sexual Activity    Alcohol use: No     Alcohol/week: 0.0 oz    Drug use: No    Sexual activity: No     Review of Systems   Constitution: Positive for fever (subjective), weakness and malaise/fatigue.   HENT: Positive for congestion.    Eyes: Negative.    Cardiovascular: Positive for orthopnea.   Respiratory: Positive for cough, shortness of breath and sleep disturbances due to breathing.    Endocrine: Negative.    Hematologic/Lymphatic: Negative.    Skin: Negative.    Musculoskeletal: Negative.    Gastrointestinal: Negative.    Genitourinary: Negative.    Psychiatric/Behavioral: Negative.    Allergic/Immunologic: Negative.      Objective:     Vital Signs (Most Recent):  Temp: 98.3 °F (36.8 °C) (18 0642)  Pulse: 77 (18 1301)  Resp: (!) 30 (18 1301)  BP: (!) 112/55 (18 1301)  SpO2: 95 % (18 1301) Vital Signs (24h Range):  Temp:  [97.7 °F (36.5 °C)-98.7 °F (37.1 °C)] 98.3 °F (36.8 °C)  Pulse:  [] 77  Resp:  [17-39] 30  SpO2:  [82 %-100 %] 95 %  BP: (108-181)/(54-88) 112/55     Weight: 78 kg (171 lb 14.4 oz)  Body mass index is 28.61  kg/m².    SpO2: 95 %  O2 Device (Oxygen Therapy): Vapotherm      Intake/Output Summary (Last 24 hours) at 11/27/2018 1313  Last data filed at 11/27/2018 0638  Gross per 24 hour   Intake 19.62 ml   Output 1375 ml   Net -1355.38 ml       Lines/Drains/Airways     Drain                 Urethral Catheter 11/26/18 1823 Latex 16 Fr. less than 1 day          Peripheral Intravenous Line                 Peripheral IV - Single Lumen 11/26/18 1806 Right Hand less than 1 day         Peripheral IV - Single Lumen 11/26/18 1916 Right Antecubital less than 1 day                Physical Exam   Constitutional: She is oriented to person, place, and time. She appears well-developed and well-nourished. No distress.   HENT:   Head: Normocephalic and atraumatic.   Eyes: Pupils are equal, round, and reactive to light. Right eye exhibits no discharge. Left eye exhibits no discharge.   Neck: Neck supple. JVD present. No thyromegaly present.   Cardiovascular: Normal rate, regular rhythm, S1 normal and S2 normal.   No murmur heard.  Pulmonary/Chest: Effort normal. She has rales.   Diminished BS right base   Abdominal: Soft. She exhibits no distension. There is no rebound.   Musculoskeletal: She exhibits edema.   Neurological: She is alert and oriented to person, place, and time.   Skin: Skin is warm and dry. She is not diaphoretic. No erythema.   Psychiatric: She has a normal mood and affect. Her behavior is normal. Thought content normal.   Nursing note and vitals reviewed.      Significant Labs:   CMP   Recent Labs   Lab 11/26/18 1807 11/27/18  0546    143   K 4.1 4.1    109   CO2 17* 21*   * 241*   BUN 69* 68*   CREATININE 4.1* 4.0*   CALCIUM 9.2 8.6*   PROT 7.2  --    ALBUMIN 3.4*  --    BILITOT 0.8  --    ALKPHOS 84  --    AST 25  --    ALT 13  --    ANIONGAP 14 13   ESTGFRAFRICA 12* 12*   EGFRNONAA 10* 11*   , CBC   Recent Labs   Lab 11/26/18 1807 11/27/18  0546   WBC 10.37 9.61   HGB 10.1* 8.2*   HCT 31.3* 25.3*     186   , Troponin   Recent Labs   Lab 11/26/18  1807 11/26/18  2359 11/27/18  0546   TROPONINI 4.417* 15.866* 16.308*    and All pertinent lab results from the last 24 hours have been reviewed.    Significant Imaging: Echocardiogram:   2D echo with color flow doppler:   Results for orders placed or performed during the hospital encounter of 11/26/18   2D echo with color flow doppler   Result Value Ref Range    QEF 45 55 - 65    Mitral Valve Regurgitation MILD TO MODERATE     Diastolic Dysfunction Yes (A)     Est. PA Systolic Pressure 69.15 (A)     Tricuspid Valve Regurgitation MILD    , EKG: Reviewed and X-Ray: CXR: X-Ray Chest 1 View (CXR): No results found for this visit on 11/26/18. and X-Ray Chest PA and Lateral (CXR): No results found for this visit on 11/26/18.

## 2018-11-27 NOTE — ASSESSMENT & PLAN NOTE
- BP stable.  - Resume home hydralazine and amlodipine.  Continue BB and diuresis as above.  - ACEi on hold as above.

## 2018-11-27 NOTE — PLAN OF CARE
SW met with patient in the ED.  Patient lives with her daughter Elizabeth Monzon [phone].  Patient is independent with ADL's at home.  Patient needs assistance obtaining a hearing aid.  No other anticipated needs at present. Case mgt to follow up with d/c needs.     11/27/18 1156   Discharge Assessment   Assessment Type Discharge Planning Assessment   Confirmed/corrected address and phone number on facesheet? Yes   Assessment information obtained from? Patient   Current cognitive status: Alert/Oriented   Current Functional Status: Independent   Lives With child(robert), adult   Able to Return to Prior Arrangements yes   Is patient able to care for self after discharge? Yes   Who are your caregiver(s) and their phone number(s)? Callie Monzon ([facility])[phone]   Patient's perception of discharge disposition home or selfcare   Readmission Within The Last 30 Days no previous admission in last 30 days   Patient currently being followed by outpatient case management? No   Patient currently receives any other outside agency services? No   Equipment Currently Used at Home none   Do you have any problems affording any of your prescribed medications? No   Is the patient taking medications as prescribed? no   Does the patient have transportation home? Yes   Transportation Available family or friend will provide   Does the patient receive services at the [facility]? No   Discharge Plan A Home with family

## 2018-11-27 NOTE — ASSESSMENT & PLAN NOTE
- H&H stable at baseline.    -1 unit of PRBCs transfused   -repeat CBC in am   -transfuse as needed.

## 2018-11-27 NOTE — CONSULTS
[facility]  Nephrology  Consult Note    Patient Name: Avril Monzon  MRN: [id number]  Admission Date: 11/26/2018  Hospital Length of Stay: 1 days  Attending Provider: Zi Landon MD   Primary Care Physician: Rose Parks MD  Principal Problem:NSTEMI (non-ST elevated myocardial infarction)    Consults  Subjective:     HPI: Avril Monzon is a pleasant 73-year-old  woman with history of hypertension, type 2 diabetes, CKD stage 4, followed in our clinic by Dr. Doe, baseline serum creatinine about 2.5-3, patient also has coronary artery disease, she presents to the emergency room with some chest discomfort and shortness of breath, patient diagnosed with NSTEMI, also acute on chronic kidney disease, serum creatinine at 4 mg/dL, likely due to hemodynamic reasons, we were consulted for acute on chronic renal failure, possible need for left heart catheterization,    Past Medical History:   Diagnosis Date    Acute hypoxemic respiratory failure 11/26/2018    Anemia     Arthritis     back, hand, knees    Back pain     Cataract     CKD stage G4/A3, GFR 15 - 29 and albumin creatinine ratio >300 mg/g     GFR 40% Jan 2014 and 33% ub 3/2014 ([facility])    Diabetic retinopathy     DM (diabetes mellitus) type II controlled, neurological manifestation     GERD (gastroesophageal reflux disease)     Glaucoma     Hyperlipidemia     Hypertension     NSTEMI (non-ST elevated myocardial infarction) 11/27/2018    Peripheral neuropathy     Polyneuropathy     Proteinuria     >6 mo     Seizures     [facility] 1/2014 -dick CT NRI showed small vessel    Stroke 2012,2014    Tobacco dependence     Type 2 diabetes with peripheral circulatory disorder, controlled        Past Surgical History:   Procedure Laterality Date    BREAST MASS EXCISION      ,benign, age 15.    HYSTERECTOMY  1982    wrist cyst Right 1980s    dorsal wrist cyst       Review of patient's allergies indicates:   Allergen Reactions     Codeine Swelling    Darvon [propoxyphene] Swelling     Current Facility-Administered Medications   Medication Frequency    acetaminophen tablet 650 mg Q6H PRN    amLODIPine tablet 10 mg Daily    aspirin EC tablet 81 mg Daily    atorvastatin tablet 80 mg Daily    calcifediol Cs24 30 mcg Daily    dextrose 50% injection 12.5 g PRN    furosemide injection 80 mg BID    glucagon (human recombinant) injection 1 mg PRN    heparin 25,000 units in dextrose 5% (100 units/ml) IV bolus from bag - ADDITIONAL PRN BOLUS - 30 units/kg (max bolus 4000 units) PRN    heparin 25,000 units in dextrose 5% (100 units/ml) IV bolus from bag - ADDITIONAL PRN BOLUS - 60 units/kg (max bolus 4000 units) PRN    heparin 25,000 units in dextrose 5% 250 mL (100 units/mL) infusion LOW INTENSITY nomogram - [facility] Continuous    hydrALAZINE tablet 50 mg BID    insulin aspart U-100 pen 0-5 Units Q6H PRN    metoprolol tartrate (LOPRESSOR) tablet 25 mg BID    nitroGLYCERIN 2% TD oint ointment 1 inch Q8H    nitroGLYCERIN SL tablet 0.4 mg Q5 Min PRN    ondansetron injection 4 mg Q6H PRN    pantoprazole EC tablet 40 mg Daily    promethazine (PHENERGAN) 6.25 mg in dextrose 5 % 50 mL IVPB Q6H PRN    sodium bicarbonate tablet 325 mg TID     Current Outpatient Medications   Medication    amLODIPine (NORVASC) 10 MG tablet    atorvastatin (LIPITOR) 80 MG tablet    blood sugar diagnostic Strp    calcifediol (RAYALDEE) 30 mcg Cs24    hydrALAZINE (APRESOLINE) 50 MG tablet    insulin NPH-insulin regular, 70/30, (NOVOLIN 70/30) 100 unit/mL (70-30) injection    lancets (ACCU-CHEK SOFTCLIX LANCETS) Misc    lisinopril (PRINIVIL,ZESTRIL) 20 MG tablet    sodium bicarbonate 325 MG tablet    torsemide (DEMADEX) 20 MG Tab     Family History     Problem Relation (Age of Onset)    Cancer Maternal Grandmother    Diabetes Mother    Heart disease Mother    Hyperlipidemia Mother    Hypertension Mother    Muscular dystrophy Son        Tobacco Use     Smoking status: Former Smoker     Packs/day: 1.50     Years: 30.00     Pack years: 45.00     Types: Cigarettes     Last attempt to quit: 1990     Years since quittin.9    Smokeless tobacco: Former User   Substance and Sexual Activity    Alcohol use: No     Alcohol/week: 0.0 oz    Drug use: No    Sexual activity: No     Review of Systems   Constitutional: Positive for activity change and fatigue. Negative for appetite change and fever.   HENT: Negative for congestion, facial swelling, sore throat, trouble swallowing and voice change.    Eyes: Negative for redness and visual disturbance.   Respiratory: Positive for shortness of breath. Negative for apnea, cough, chest tightness and wheezing.    Cardiovascular: Positive for chest pain. Negative for palpitations and leg swelling.   Gastrointestinal: Negative for abdominal distention, abdominal pain, blood in stool, constipation, diarrhea, nausea and vomiting.   Genitourinary: Negative for decreased urine volume, difficulty urinating, dysuria, flank pain, frequency, hematuria, pelvic pain and urgency.   Musculoskeletal: Negative for back pain, gait problem and joint swelling.   Skin: Negative for color change and rash.   Neurological: Positive for weakness. Negative for dizziness, syncope and headaches.   Hematological: Does not bruise/bleed easily.   Psychiatric/Behavioral: Negative for agitation, behavioral problems and confusion. The patient is not nervous/anxious.      Objective:     Vital Signs (Most Recent):  Temp: 98.3 °F (36.8 °C) (18 0642)  Pulse: 78 (18 1142)  Resp: (!) 21 (18 1142)  BP: (!) 113/56 (18 1142)  SpO2: (!) 94 % (18 1142)  O2 Device (Oxygen Therapy): Vapotherm (18 1142) Vital Signs (24h Range):  Temp:  [97.7 °F (36.5 °C)-98.7 °F (37.1 °C)] 98.3 °F (36.8 °C)  Pulse:  [] 78  Resp:  [17-39] 21  SpO2:  [82 %-100 %] 94 %  BP: (113-181)/(56-88) 113/56     Weight: 78 kg (171 lb 14.4 oz) (18  1902)  Body mass index is 28.61 kg/m².  Body surface area is 1.89 meters squared.    I/O last 3 completed shifts:  In: 19.6 [IV Piggyback:19.6]  Out: 1375 [Urine:1375]    Physical Exam   Constitutional: She is oriented to person, place, and time. She appears well-developed. She appears distressed.   HENT:   Head: Normocephalic and atraumatic.   Mouth/Throat: Oropharynx is clear and moist. No oropharyngeal exudate.   Eyes: Conjunctivae and EOM are normal. Pupils are equal, round, and reactive to light.   Neck: Normal range of motion. Neck supple. No JVD present. Carotid bruit is not present. No tracheal deviation present. No thyroid mass and no thyromegaly present.   Cardiovascular: Normal rate, regular rhythm and intact distal pulses. Exam reveals no gallop and no friction rub.   Murmur heard.  Pulmonary/Chest: Effort normal. No respiratory distress. She has no wheezes. She has rales. She exhibits no tenderness.   Abdominal: Soft. Bowel sounds are normal. She exhibits no distension, no abdominal bruit, no ascites and no mass. There is no hepatosplenomegaly. There is no tenderness. There is no rebound, no guarding and no CVA tenderness.   Musculoskeletal: She exhibits no edema or tenderness.   Lymphadenopathy:     She has no cervical adenopathy.   Neurological: She is alert and oriented to person, place, and time. She has normal reflexes. She displays normal reflexes. No cranial nerve deficit. She exhibits normal muscle tone. Coordination normal.   Skin: Skin is warm and intact. No rash noted. No erythema. No pallor.       Significant Labs:  Cardiac Markers: No results for input(s): CKMB, TROPONINT, MYOGLOBIN in the last 168 hours.  CBC:   Recent Labs   Lab 11/27/18  0546   WBC 9.61   RBC 2.67*   HGB 8.2*   HCT 25.3*      MCV 95   MCH 30.7   MCHC 32.4     CMP:   Recent Labs   Lab 11/26/18  1807 11/27/18  0546   * 241*   CALCIUM 9.2 8.6*   ALBUMIN 3.4*  --    PROT 7.2  --     143   K 4.1 4.1   CO2  17* 21*    109   BUN 69* 68*   CREATININE 4.1* 4.0*   ALKPHOS 84  --    ALT 13  --    AST 25  --    BILITOT 0.8  --      Coagulation:   Recent Labs   Lab 11/26/18  1807  11/27/18  0944   INR 0.9  --   --    APTT 23.9   < > 72.3*    < > = values in this interval not displayed.     All labs within the past 24 hours have been reviewed.    Significant Imaging: reviewed     Lab Results   Component Value Date    .0 (H) 01/23/2018    CALCIUM 8.6 (L) 11/27/2018    PHOS 3.6 07/31/2018       Lab Results   Component Value Date    ALBUMIN 3.4 (L) 11/26/2018         Assessment/Plan:     GREGG on CKD, stage IV    1. GREGG on CKD stage 4 :  Had a lengthy discussion with the patient at the bedside, patient has chronic kidney disease stage 4, baseline creatinine about 2.5-3, serum creatinine on presentation was 4 mg/dL, acute on chronic kidney disease likely due to hemodynamic reasons due to acute MI, had a lengthy discussion and discussed about risks/benefits of left heart catheterization, IV contrast exposure, possibility of worsening renal function requiring renal replacement therapy, patient understands the risks and agrees to proceed with the plan, if possible can do gentle hydration and hour before and 6 hr after after LHC , with acute decompensated heart failure IV fluids have to be used with caution.  Patient interested in peritoneal dialysis when indicated,    2.  Hypertension - resume home blood pressure medications,    3. NSTEMI - continue medications, LHC per Cardiology,    4. Anemia of CKD :  Follow hemoglobin, consider PRBC transfusion if indicated due to acute MI, can give some IV furosemide prior transfusion,    5. Stable lytes            Thank you for your consult. I will follow-up with patient. Please contact us if you have any additional questions.     Total time spent 70 minutes including time needed to review the records,  patient  evaluation, documentation, face-to-face discussion with the patient,  primary team,    more than 50% of the time was spent on coordination of care and counseling.       Leonard Bryan MD   Nephrology  [facility]

## 2018-11-27 NOTE — HPI
Avril Monzon is a pleasant 73-year-old  woman with history of hypertension, type 2 diabetes, CKD stage 4, followed in our clinic by Dr. Doe, baseline serum creatinine about 2.5-3, patient also has coronary artery disease, she presents to the emergency room with some chest discomfort and shortness of breath, patient diagnosed with NSTEMI, also acute on chronic kidney disease, serum creatinine at 4 mg/dL, likely due to hemodynamic reasons, we were consulted for acute on chronic renal failure, possible need for left heart catheterization,

## 2018-11-27 NOTE — ASSESSMENT & PLAN NOTE
1. GREGG on CKD stage 4 :  Had a lengthy discussion with the patient at the bedside, patient has chronic kidney disease stage 4, baseline creatinine about 2.5-3, serum creatinine on presentation was 4 mg/dL, acute on chronic kidney disease likely due to hemodynamic reasons due to acute MI, had a lengthy discussion and discussed about risks/benefits of left heart catheterization, IV contrast exposure, possibility of worsening renal function requiring renal replacement therapy, patient understands the risks and agrees to proceed with the plan, if possible can do gentle hydration and hour before and 6 hr after after LHC , with acute decompensated heart failure IV fluids have to be used with caution.  Patient interested in peritoneal dialysis when indicated,    2.  Hypertension - resume home blood pressure medications,    3. NSTEMI - continue medications, LHC per Cardiology,    4. Anemia of CKD :  Follow hemoglobin, consider PRBC transfusion if indicated due to acute MI, can give some IV furosemide prior transfusion,    5. Stable lytes

## 2018-11-27 NOTE — ASSESSMENT & PLAN NOTE
-Patient who presented in fulminant pulmonary edema/decompensated CHF in setting of MI  -Troponin 4.417>15.866>16.308  -Needs IV diuresis/optimization of medical therapy  -Continue ASA, amlodipine, statin, hydralazine, lopressor  -Add oral nitrates  -Continue heparin gtt for anticoagulation  -Will need R/LHC once optimized. Patient is at high risk of contrast-induced nephropathy/transition to dialysis. All risks, benefits, and treatment alternatives explained to patient in detail.  -Nephrology on board, appreciate assistance

## 2018-11-27 NOTE — ED NOTES
Per low intensity heparin infusion nomogram with aptt 37.6: bolus of 30 units/kg then increase dose by 2 units/kg/hr

## 2018-11-27 NOTE — ASSESSMENT & PLAN NOTE
-Patient who presents with acute decompensated CHF secondary to NSTEMI/MI  -Needs IV diuresis  -Strict I's/O's  -Assess response  -Continue other meds  -No ACEI/ARB given underlying renal function  -Nephrology on board

## 2018-11-28 PROBLEM — I21.4 NON-ST ELEVATION MI (NSTEMI): Status: ACTIVE | Noted: 2018-11-28

## 2018-11-28 PROBLEM — I27.20 PULMONARY HYPERTENSION: Status: ACTIVE | Noted: 2018-11-28

## 2018-11-28 LAB
ANION GAP SERPL CALC-SCNC: 13 MMOL/L
ANION GAP SERPL CALC-SCNC: 13 MMOL/L
APTT BLDCRRT: 37.9 SEC
APTT BLDCRRT: 38.6 SEC
APTT BLDCRRT: 40.8 SEC
BASOPHILS # BLD AUTO: 0.01 K/UL
BASOPHILS # BLD AUTO: 0.01 K/UL
BASOPHILS NFR BLD: 0.1 %
BASOPHILS NFR BLD: 0.1 %
BUN SERPL-MCNC: 69 MG/DL
BUN SERPL-MCNC: 69 MG/DL
CALCIUM SERPL-MCNC: 8.3 MG/DL
CALCIUM SERPL-MCNC: 8.3 MG/DL
CHLORIDE SERPL-SCNC: 106 MMOL/L
CHLORIDE SERPL-SCNC: 106 MMOL/L
CO2 SERPL-SCNC: 20 MMOL/L
CO2 SERPL-SCNC: 20 MMOL/L
CREAT SERPL-MCNC: 4.2 MG/DL
CREAT SERPL-MCNC: 4.2 MG/DL
DIFFERENTIAL METHOD: ABNORMAL
DIFFERENTIAL METHOD: ABNORMAL
EOSINOPHIL # BLD AUTO: 0 K/UL
EOSINOPHIL # BLD AUTO: 0 K/UL
EOSINOPHIL NFR BLD: 0.2 %
EOSINOPHIL NFR BLD: 0.2 %
ERYTHROCYTE [DISTWIDTH] IN BLOOD BY AUTOMATED COUNT: 14.9 %
ERYTHROCYTE [DISTWIDTH] IN BLOOD BY AUTOMATED COUNT: 14.9 %
EST. GFR  (AFRICAN AMERICAN): 12 ML/MIN/1.73 M^2
EST. GFR  (AFRICAN AMERICAN): 12 ML/MIN/1.73 M^2
EST. GFR  (NON AFRICAN AMERICAN): 10 ML/MIN/1.73 M^2
EST. GFR  (NON AFRICAN AMERICAN): 10 ML/MIN/1.73 M^2
GLUCOSE SERPL-MCNC: 190 MG/DL
GLUCOSE SERPL-MCNC: 190 MG/DL
HCT VFR BLD AUTO: 27.3 %
HCT VFR BLD AUTO: 27.3 %
HGB BLD-MCNC: 9 G/DL
HGB BLD-MCNC: 9 G/DL
LYMPHOCYTES # BLD AUTO: 0.8 K/UL
LYMPHOCYTES # BLD AUTO: 0.8 K/UL
LYMPHOCYTES NFR BLD: 9.1 %
LYMPHOCYTES NFR BLD: 9.1 %
MCH RBC QN AUTO: 30.3 PG
MCH RBC QN AUTO: 30.3 PG
MCHC RBC AUTO-ENTMCNC: 33 G/DL
MCHC RBC AUTO-ENTMCNC: 33 G/DL
MCV RBC AUTO: 92 FL
MCV RBC AUTO: 92 FL
MONOCYTES # BLD AUTO: 0.5 K/UL
MONOCYTES # BLD AUTO: 0.5 K/UL
MONOCYTES NFR BLD: 5.7 %
MONOCYTES NFR BLD: 5.7 %
NEUTROPHILS # BLD AUTO: 7.2 K/UL
NEUTROPHILS # BLD AUTO: 7.2 K/UL
NEUTROPHILS NFR BLD: 84.9 %
NEUTROPHILS NFR BLD: 84.9 %
PLATELET # BLD AUTO: 182 K/UL
PLATELET # BLD AUTO: 182 K/UL
PMV BLD AUTO: 10.5 FL
PMV BLD AUTO: 10.5 FL
POCT GLUCOSE: 191 MG/DL (ref 70–110)
POCT GLUCOSE: 200 MG/DL (ref 70–110)
POCT GLUCOSE: 202 MG/DL (ref 70–110)
POCT GLUCOSE: 263 MG/DL (ref 70–110)
POCT GLUCOSE: 292 MG/DL (ref 70–110)
POTASSIUM SERPL-SCNC: 3.5 MMOL/L
POTASSIUM SERPL-SCNC: 3.5 MMOL/L
RBC # BLD AUTO: 2.97 M/UL
RBC # BLD AUTO: 2.97 M/UL
SODIUM SERPL-SCNC: 139 MMOL/L
SODIUM SERPL-SCNC: 139 MMOL/L
WBC # BLD AUTO: 8.46 K/UL
WBC # BLD AUTO: 8.46 K/UL

## 2018-11-28 PROCEDURE — 63600175 PHARM REV CODE 636 W HCPCS: Performed by: EMERGENCY MEDICINE

## 2018-11-28 PROCEDURE — 25000003 PHARM REV CODE 250: Performed by: PHYSICIAN ASSISTANT

## 2018-11-28 PROCEDURE — 36415 COLL VENOUS BLD VENIPUNCTURE: CPT

## 2018-11-28 PROCEDURE — S5571 INSULIN DISPOS PEN 3 ML: HCPCS | Performed by: INTERNAL MEDICINE

## 2018-11-28 PROCEDURE — 36430 TRANSFUSION BLD/BLD COMPNT: CPT

## 2018-11-28 PROCEDURE — 99232 SBSQ HOSP IP/OBS MODERATE 35: CPT | Mod: ,,, | Performed by: INTERNAL MEDICINE

## 2018-11-28 PROCEDURE — 80048 BASIC METABOLIC PNL TOTAL CA: CPT

## 2018-11-28 PROCEDURE — 85025 COMPLETE CBC W/AUTO DIFF WBC: CPT

## 2018-11-28 PROCEDURE — 99222 1ST HOSP IP/OBS MODERATE 55: CPT | Mod: ,,, | Performed by: INTERNAL MEDICINE

## 2018-11-28 PROCEDURE — 99291 CRITICAL CARE FIRST HOUR: CPT | Mod: ,,, | Performed by: INTERNAL MEDICINE

## 2018-11-28 PROCEDURE — 25000003 PHARM REV CODE 250: Performed by: INTERNAL MEDICINE

## 2018-11-28 PROCEDURE — 63600175 PHARM REV CODE 636 W HCPCS: Performed by: INTERNAL MEDICINE

## 2018-11-28 PROCEDURE — 27100171 HC OXYGEN HIGH FLOW UP TO 24 HOURS

## 2018-11-28 PROCEDURE — 85730 THROMBOPLASTIN TIME PARTIAL: CPT

## 2018-11-28 PROCEDURE — 25000003 PHARM REV CODE 250: Performed by: NURSE PRACTITIONER

## 2018-11-28 PROCEDURE — 25000003 PHARM REV CODE 250: Performed by: EMERGENCY MEDICINE

## 2018-11-28 PROCEDURE — 63600175 PHARM REV CODE 636 W HCPCS: Performed by: NURSE PRACTITIONER

## 2018-11-28 PROCEDURE — 85730 THROMBOPLASTIN TIME PARTIAL: CPT | Mod: 91

## 2018-11-28 PROCEDURE — 21400001 HC TELEMETRY ROOM

## 2018-11-28 PROCEDURE — 27000221 HC OXYGEN, UP TO 24 HOURS

## 2018-11-28 RX ORDER — INSULIN ASPART 100 [IU]/ML
1-10 INJECTION, SOLUTION INTRAVENOUS; SUBCUTANEOUS
Status: DISCONTINUED | OUTPATIENT
Start: 2018-11-28 | End: 2018-12-04 | Stop reason: HOSPADM

## 2018-11-28 RX ORDER — POTASSIUM CHLORIDE 20 MEQ/1
40 TABLET, EXTENDED RELEASE ORAL ONCE
Status: COMPLETED | OUTPATIENT
Start: 2018-11-28 | End: 2018-11-28

## 2018-11-28 RX ORDER — AMLODIPINE BESYLATE 5 MG/1
5 TABLET ORAL DAILY
Status: DISCONTINUED | OUTPATIENT
Start: 2018-11-29 | End: 2018-12-01

## 2018-11-28 RX ORDER — HYDRALAZINE HYDROCHLORIDE 25 MG/1
25 TABLET, FILM COATED ORAL 2 TIMES DAILY
Status: DISCONTINUED | OUTPATIENT
Start: 2018-11-28 | End: 2018-11-29

## 2018-11-28 RX ORDER — IBUPROFEN 200 MG
24 TABLET ORAL
Status: DISCONTINUED | OUTPATIENT
Start: 2018-11-28 | End: 2018-12-04 | Stop reason: HOSPADM

## 2018-11-28 RX ORDER — IBUPROFEN 200 MG
16 TABLET ORAL
Status: DISCONTINUED | OUTPATIENT
Start: 2018-11-28 | End: 2018-12-04 | Stop reason: HOSPADM

## 2018-11-28 RX ADMIN — ISOSORBIDE MONONITRATE 60 MG: 60 TABLET, EXTENDED RELEASE ORAL at 08:11

## 2018-11-28 RX ADMIN — SODIUM BICARBONATE TAB 325 MG 325 MG: 325 TAB at 02:11

## 2018-11-28 RX ADMIN — HYDRALAZINE HYDROCHLORIDE 50 MG: 50 TABLET, FILM COATED ORAL at 08:11

## 2018-11-28 RX ADMIN — ACETAMINOPHEN 650 MG: 325 TABLET ORAL at 02:11

## 2018-11-28 RX ADMIN — INSULIN DETEMIR 15 UNITS: 100 INJECTION, SOLUTION SUBCUTANEOUS at 01:11

## 2018-11-28 RX ADMIN — ACETAMINOPHEN 650 MG: 325 TABLET ORAL at 09:11

## 2018-11-28 RX ADMIN — HYDRALAZINE HYDROCHLORIDE 25 MG: 25 TABLET, FILM COATED ORAL at 09:11

## 2018-11-28 RX ADMIN — ASPIRIN 81 MG: 81 TABLET, COATED ORAL at 08:11

## 2018-11-28 RX ADMIN — INSULIN ASPART 6 UNITS: 100 INJECTION, SOLUTION INTRAVENOUS; SUBCUTANEOUS at 01:11

## 2018-11-28 RX ADMIN — PANTOPRAZOLE SODIUM 40 MG: 40 TABLET, DELAYED RELEASE ORAL at 08:11

## 2018-11-28 RX ADMIN — POTASSIUM CHLORIDE 40 MEQ: 1500 TABLET, EXTENDED RELEASE ORAL at 01:11

## 2018-11-28 RX ADMIN — AMLODIPINE BESYLATE 10 MG: 5 TABLET ORAL at 08:11

## 2018-11-28 RX ADMIN — HEPARIN SODIUM 14 UNITS/KG/HR: 10000 INJECTION, SOLUTION INTRAVENOUS at 08:11

## 2018-11-28 RX ADMIN — METOPROLOL TARTRATE 25 MG: 25 TABLET ORAL at 08:11

## 2018-11-28 RX ADMIN — INSULIN ASPART 3 UNITS: 100 INJECTION, SOLUTION INTRAVENOUS; SUBCUTANEOUS at 09:11

## 2018-11-28 RX ADMIN — ATORVASTATIN CALCIUM 80 MG: 40 TABLET, FILM COATED ORAL at 08:11

## 2018-11-28 RX ADMIN — SODIUM BICARBONATE TAB 325 MG 325 MG: 325 TAB at 08:11

## 2018-11-28 RX ADMIN — INSULIN ASPART 2 UNITS: 100 INJECTION, SOLUTION INTRAVENOUS; SUBCUTANEOUS at 05:11

## 2018-11-28 RX ADMIN — ACETAMINOPHEN 650 MG: 325 TABLET ORAL at 06:11

## 2018-11-28 RX ADMIN — SODIUM BICARBONATE TAB 325 MG 325 MG: 325 TAB at 09:11

## 2018-11-28 RX ADMIN — METOPROLOL TARTRATE 25 MG: 25 TABLET ORAL at 09:11

## 2018-11-28 RX ADMIN — FUROSEMIDE 80 MG: 10 INJECTION, SOLUTION INTRAMUSCULAR; INTRAVENOUS at 08:11

## 2018-11-28 RX ADMIN — FUROSEMIDE 80 MG: 10 INJECTION, SOLUTION INTRAMUSCULAR; INTRAVENOUS at 05:11

## 2018-11-28 NOTE — PROGRESS NOTES
[facility]   Critical Care Medicine  Progress Note    Patient Name: Avril Monzon  MRN: [id number]  Admission Date: 11/26/2018  Hospital Length of Stay: 2 days  Code Status: Full Code  Attending Provider: Zi Landon MD  Primary Care Provider: Rose Parks MD   Principal Problem: NSTEMI (non-ST elevated myocardial infarction)    Subjective:     HPI:  73-year-old female patient presenting for 3 days history of worsening shortness of breath, chest congestion, coughing, and low-grade fever.  She felt she had upper respiratory infection about 3 days ago.    Forty-five pack year smoking history quit in 1990s.    Does not have oxygen or NIV at home.    Hospital/ICU Course:  Patient was found hypoxemic 82% on admission, needed BiPAP, currently on Vapotherm 50% FiO2 and 25 liter/minute of O2 flow.  Troponin elevated, BNP elevated, chest x-ray shows pulmonary congestion.  On IV Lasix -1 0.3 L.  11/27 patient seen and examined, T-max 101° last 24 hr, on oxygen via Vapotherm, -2 L yesterday    Interval History:     Slight improvement  ROS  Objective:     Vital Signs (Most Recent):  Temp: 99.1 °F (37.3 °C) (11/28/18 0800)  Pulse: 70 (11/28/18 1000)  Resp: (!) 5 (11/28/18 1000)  BP: (!) 100/39 (11/28/18 1000)  SpO2: (S) 99 % (11/28/18 1159) Vital Signs (24h Range):  Temp:  [99.1 °F (37.3 °C)-101.2 °F (38.4 °C)] 99.1 °F (37.3 °C)  Pulse:  [70-86] 70  Resp:  [5-30] 5  SpO2:  [95 %-100 %] 99 %  BP: (100-136)/(30-69) 100/39     Weight: 68.8 kg (151 lb 10.8 oz)  Body mass index is 25.24 kg/m².      Intake/Output Summary (Last 24 hours) at 11/28/2018 1224  Last data filed at 11/28/2018 1100  Gross per 24 hour   Intake 1003.28 ml   Output 1590 ml   Net -586.72 ml       Physical Exam   Constitutional: She is oriented to person, place, and time. She appears well-developed and well-nourished. No distress.   HENT:   Head: Normocephalic and atraumatic.   On Vapotherm   Eyes: EOM are normal.   Neck: Neck supple.    Cardiovascular: Normal rate and regular rhythm.   Pulmonary/Chest: Effort normal. No respiratory distress. She has rales. She exhibits no tenderness.   Abdominal: Soft. There is no tenderness.   Genitourinary:   Genitourinary Comments: Pfeiffer in place   Musculoskeletal: She exhibits edema.   Trace edema bilateral lower extremity   Neurological: She is alert and oriented to person, place, and time.   Skin: Skin is warm.   Psychiatric: She has a normal mood and affect. Her behavior is normal. Judgment and thought content normal.   Nursing note and vitals reviewed.      Vents:  Oxygen Concentration (%): 45 (11/28/18 1000)    Lines/Drains/Airways     Drain                 Urethral Catheter 11/26/18 1823 Latex 16 Fr. 1 day          Peripheral Intravenous Line                 Peripheral IV - Single Lumen 11/26/18 1806 Right Hand 1 day         Peripheral IV - Single Lumen 11/26/18 1916 Right Antecubital 1 day                Significant Labs:    CBC/Anemia Profile:  Recent Labs   Lab 11/26/18  1807 11/27/18  0546 11/28/18  0611   WBC 10.37 9.61 8.46  8.46   HGB 10.1* 8.2* 9.0*  9.0*   HCT 31.3* 25.3* 27.3*  27.3*    186 182  182   MCV 94 95 92  92   RDW 13.2 13.2 14.9*  14.9*        Chemistries:  Recent Labs   Lab 11/26/18  1807 11/27/18  0546 11/28/18  0611    143 139  139   K 4.1 4.1 3.5  3.5    109 106  106   CO2 17* 21* 20*  20*   BUN 69* 68* 69*  69*   CREATININE 4.1* 4.0* 4.2*  4.2*   CALCIUM 9.2 8.6* 8.3*  8.3*   ALBUMIN 3.4*  --   --    PROT 7.2  --   --    BILITOT 0.8  --   --    ALKPHOS 84  --   --    ALT 13  --   --    AST 25  --   --    Echo 11/27/2018    1 - Wall motion abnormalities.     2 - Mildly depressed left ventricular systolic function (EF 45-50%).     3 - Impaired LV relaxation, normal LAP (grade 1 diastolic dysfunction).     4 - Normal right ventricular systolic function .     5 - Pulmonary hypertension. The estimated PA systolic pressure is 69 mmHg.     6 - Mild to  moderate mitral regurgitation.     7 - Mild tricuspid regurgitation.     8 - Intermediate central venous pressure.     9 - Right pleural effusion.     Significant Imaging:  CXR: I have reviewed all pertinent results/findings within the past 24 hours and my personal findings are:  Improved bilateral pulmonary edema.  Bilateral mid lung fields alveolar opacities    Assessment/Plan:     Pulmonary   Pulmonary hypertension    11/28 elevated systolic PA pressure likely related to left heart disease, continue diuresis.  Will evaluate for home  O2 on discharge     Acute hypoxemic respiratory failure secondary to acute decompensated HF    Strict I&Os, IV Lasix 80 mg q.12 hours.  Repeat chest x-ray in a.m.  Vapotherm keep O2 sat above 89%, BiPAP p.r.n. Distress  11/28 wean O2 , BiPAP p.r.n., continue IV Lasix 80 mg q.12 hours.  Strict I&Os.     Cardiac/Vascular   * NSTEMI (non-ST elevated myocardial infarction)    Cardiac monitoring  IV heparin, aspirin , statin , beta-blocker, cardiology follow-up  11/28 same.  Monitor PTT     Renal/   GREGG on CKD, stage IV    P.o. bicarb.  Strict I&Os.  Monitor creatinine bicarb level.  11/28 continue p.o. bicarb.  Strict I&Os.  Monitor BMP     Endocrine   Controlled type 2 diabetes mellitus with diabetic polyneuropathy, with long-term current use of insulin    Fingerstick q.6 hours.  SSI.        Critical Care Daily Checklist:    A: Awake: RASS Goal/Actual Goal:    Actual: [place] Agitation Sedation Scale (RASS): Alert and calm   B: Spontaneous Breathing Trial Performed?     C: SAT & SBT Coordinated?  Not applicable                      D: Delirium: CAM-ICU Overall CAM-ICU: Negative   E: Early Mobility Performed? Yes   F: Feeding Goal:    Status:     Current Diet Order   Procedures    Diet Cardiac [facility]; Renal     Order Specific Question:   Indicate patient location for additional diet options:     Answer:   [facility]     Order Specific Question:   Additional Diet  Options:     Answer:   Renal      AS: Analgesia/Sedation No sedation   T: Thromboembolic Prophylaxis On IV heparin   H: HOB > 300 Yes   U: Stress Ulcer Prophylaxis (if needed) PPI   G: Glucose Control Fingerstick q.6 hours SSI   B: Bowel Function     I: Indwelling Catheter (Lines & Pfeiffer) Necessity Keep Pfeiffer for strict I&Os.  Critically ill patient   D: De-escalation of Antimicrobials/Pharmacotherapies Monitor temperature.  Blood cultures negative so far.  WBC not elevated.    Plan for the day/ETD Y    Code Status:  Family/Goals of Care: Full Code  Y     Critical Care Time: 35 minutes  Critical secondary to Patient has a condition that poses threat to life and bodily function: Acute Myocardial Infarction      Critical care was time spent personally by me on the following activities: development of treatment plan with patient or surrogate and bedside caregivers, discussions with consultants, evaluation of patient's response to treatment, examination of patient, ordering and performing treatments and interventions, ordering and review of laboratory studies, ordering and review of radiographic studies, pulse oximetry, re-evaluation of patient's condition. This critical care time did not overlap with that of any other provider or involve time for any procedures.     Hattie Stephens MD  Critical Care Medicine  [facility]

## 2018-11-28 NOTE — PROGRESS NOTES
Subjective:      Patient ID: Avril Monzon is a 73 y.o. female.    Chief Complaint: No chief complaint on file.      HPI    Review of Systems      Objective:   There were no vitals taken for this visit.    Physical Exam        Assessment:       No diagnosis found.      Plan:     There are no diagnoses linked to this encounter.

## 2018-11-28 NOTE — PROGRESS NOTES
"[facility]  Cardiology  Progress Note    Patient Name: Avril Monzon  MRN: [id number]  Admission Date: 11/26/2018  Hospital Length of Stay: 2 days  Code Status: Full Code   Attending Physician: Zi Landon MD   Primary Care Physician: Rose Parks MD  Expected Discharge Date:   Principal Problem:NSTEMI (non-ST elevated myocardial infarction)    Subjective:   HPI:  Ms. Monzon is a 73 year old female patient with a PMHx of HTN, hyperlipidemia, DM type II, former tobacco abuse, CKD, anemia, and CVA who presented to [facility] ED last night with a chief complaint of progressively worsening SOB over the past 2-3 days. Patient stated she began to feel like she was coming down with a "cold or sinus infection" on Friday and developed a persistent cough when lying down. Associated symptoms included fatigue and subjective fever. Patient denied any associated ministerio chest pain, palpitations, weight gain, nausea, vomiting, diaphoresis, near syncope, or syncope. Initial workup in ED revealed creatinine of 4.1, BNP of 1613, troponin of 4.4, and lactic acid of 2.7. CXR showed evidence of fluid overload. EKG showed inferior ST depression and patient subsequently admitted for further evaluation and treatment. Cardiology consulted to assist with management. Patient seen and examined today while in ED. States SOB is improving. Remains chest pain free. Denies any prior cardiac history-no known CAD or history of MI. Patient reports compliance with her medications. Chart reviewed. Troponin 4417>15.866>16.308. Repeat EKG this AM showed diffuse ST depression in anterior leads. 2D echo showed EF of 45-50%, +WMA, pulmonary HTN, and mild to moderate MR.    Hospital Course:   11/28/18-Patient seen and examined today, sitting up in bed. Feels better. SOB improving. Remains chest pain free. Diuresed well. Temp of 101 noted overnight. Labs reviewed. Creatinine 4.2.         Review of Systems   Constitution: Positive for weakness " and malaise/fatigue.   HENT: Negative.    Eyes: Negative.    Cardiovascular: Positive for dyspnea on exertion.   Respiratory: Positive for shortness of breath (improved).    Endocrine: Negative.    Hematologic/Lymphatic: Negative.    Skin: Negative.    Musculoskeletal: Negative.    Gastrointestinal: Negative.    Genitourinary: Negative.    Psychiatric/Behavioral: Negative.    Allergic/Immunologic: Negative.      Objective:     Vital Signs (Most Recent):  Temp: 99.1 °F (37.3 °C) (11/28/18 0800)  Pulse: 70 (11/28/18 1000)  Resp: (!) 5 (11/28/18 1000)  BP: (!) 100/39 (11/28/18 1000)  SpO2: (S) 99 % (11/28/18 1159) Vital Signs (24h Range):  Temp:  [99.1 °F (37.3 °C)-101.2 °F (38.4 °C)] 99.1 °F (37.3 °C)  Pulse:  [70-86] 70  Resp:  [5-30] 5  SpO2:  [95 %-100 %] 99 %  BP: (100-136)/(30-69) 100/39     Weight: 68.8 kg (151 lb 10.8 oz)  Body mass index is 25.24 kg/m².     SpO2: (S) 99 %  O2 Device (Oxygen Therapy): nasal cannula      Intake/Output Summary (Last 24 hours) at 11/28/2018 1237  Last data filed at 11/28/2018 1100  Gross per 24 hour   Intake 1003.28 ml   Output 1590 ml   Net -586.72 ml       Lines/Drains/Airways     Drain                 Urethral Catheter 11/26/18 1823 Latex 16 Fr. 1 day          Peripheral Intravenous Line                 Peripheral IV - Single Lumen 11/26/18 1806 Right Hand 1 day         Peripheral IV - Single Lumen 11/26/18 1916 Right Antecubital 1 day                Physical Exam   Constitutional: She is oriented to person, place, and time. She appears well-developed and well-nourished. No distress.   HENT:   Head: Normocephalic and atraumatic.   Eyes: Pupils are equal, round, and reactive to light. Right eye exhibits no discharge. Left eye exhibits no discharge.   Neck: Neck supple. JVD present. No thyromegaly present.   Cardiovascular: Normal rate, regular rhythm, normal heart sounds and intact distal pulses. PMI is not displaced. Exam reveals no gallop, no S3, no S4 and no friction rub.    No murmur heard.  Pulmonary/Chest: Effort normal. No respiratory distress. She has rales (faint at bases).   Abdominal: Soft. She exhibits no distension.   Musculoskeletal: She exhibits no edema.   Neurological: She is alert and oriented to person, place, and time.   Skin: Skin is warm and dry. She is not diaphoretic. No erythema.   Psychiatric: She has a normal mood and affect. Her behavior is normal. Thought content normal.   Nursing note and vitals reviewed.      Significant Labs:   CMP   Recent Labs   Lab 11/26/18  1807 11/27/18  0546 11/28/18  0611    143 139  139   K 4.1 4.1 3.5  3.5    109 106  106   CO2 17* 21* 20*  20*   * 241* 190*  190*   BUN 69* 68* 69*  69*   CREATININE 4.1* 4.0* 4.2*  4.2*   CALCIUM 9.2 8.6* 8.3*  8.3*   PROT 7.2  --   --    ALBUMIN 3.4*  --   --    BILITOT 0.8  --   --    ALKPHOS 84  --   --    AST 25  --   --    ALT 13  --   --    ANIONGAP 14 13 13  13   ESTGFRAFRICA 12* 12* 12*  12*   EGFRNONAA 10* 11* 10*  10*   , CBC   Recent Labs   Lab 11/26/18  1807 11/27/18  0546 11/28/18  0611   WBC 10.37 9.61 8.46  8.46   HGB 10.1* 8.2* 9.0*  9.0*   HCT 31.3* 25.3* 27.3*  27.3*    186 182  182   , Troponin   Recent Labs   Lab 11/26/18  2359 11/27/18  0546 11/27/18  1358   TROPONINI 15.866* 16.308* 11.162*    and All pertinent lab results from the last 24 hours have been reviewed.    Significant Imaging: Echocardiogram:   2D echo with color flow doppler:   Results for orders placed or performed during the hospital encounter of 11/26/18   2D echo with color flow doppler   Result Value Ref Range    QEF 45 55 - 65    Mitral Valve Regurgitation MILD TO MODERATE     Diastolic Dysfunction Yes (A)     Est. PA Systolic Pressure 69.15 (A)     Tricuspid Valve Regurgitation MILD     and X-Ray: CXR: X-Ray Chest 1 View (CXR): No results found for this visit on 11/26/18. and X-Ray Chest PA and Lateral (CXR): No results found for this visit on  11/26/18.    Assessment and Plan:   Patient who presents with flash pulmonary edema/acute CHF in setting of NSTEMI. Stable overnight, but febrile. Volume status improving. Will continue same meds and plan on L/RHC once afebrile and compensated.     * NSTEMI (non-ST elevated myocardial infarction)    -Patient who presented in fulminant pulmonary edema/decompensated CHF in setting of MI  -Troponin 4.417>15.866>16.308  -Needs IV diuresis/optimization of medical therapy  -Continue ASA, amlodipine, statin, hydralazine, lopressor  -Add oral nitrates  -Continue heparin gtt for anticoagulation  -Will need R/LHC once optimized. Patient is at high risk of contrast-induced nephropathy/transition to dialysis. All risks, benefits, and treatment alternatives explained to patient in detail.  -Nephrology on board, appreciate assistance     11/28/18  -Stable overnight  -Troponin trending down  -Volume status improved  -No chest pain  -Continue ASA, amlodipine, statin, hydralazine, BB, imdur  -+Temp noted; Will plan on L/RHC for further evaluation once afebrile and compensated  -NPO after MN     Hyperlipidemia    -Continue statin  -Check lipid panel     Essential hypertension    -Continue amlodipine, Lopressor, hydralazine  -Add Isosorbide mononitrate  -Monitor, BP borderline low today     Acute hypoxemic respiratory failure secondary to acute decompensated HF    -Patient who presents with acute decompensated CHF secondary to NSTEMI/MI  -Needs IV diuresis  -Strict I's/O's  -Assess response  -Continue other meds  -No ACEI/ARB given underlying renal function  -Nephrology on board    11/28/18  -Clinically improved  -Lasix decreased to once daily given borderline BP  -Continue other meds  -No ACEI/ARB given renal function  -Appreciate nephrology assistance         VTE Risk Mitigation (From admission, onward)        Ordered     IP VTE HIGH RISK PATIENT  Once      11/27/18 1342     Place sequential compression device  Until discontinued       11/27/18 1342     heparin 25,000 units in dextrose 5% 250 mL (100 units/mL) infusion LOW INTENSITY nomogram - [facility]  Continuous      11/26/18 1905     heparin 25,000 units in dextrose 5% (100 units/ml) IV bolus from bag - ADDITIONAL PRN BOLUS - 60 units/kg (max bolus 4000 units)  As needed (PRN)      11/26/18 1905     heparin 25,000 units in dextrose 5% (100 units/ml) IV bolus from bag - ADDITIONAL PRN BOLUS - 30 units/kg (max bolus 4000 units)  As needed (PRN)      11/26/18 1905          Moriah Tran PA-C  Cardiology  [facility]     Chart reviewed. Dr. Holman examined patient and agrees with plan as outlined above.

## 2018-11-28 NOTE — PROGRESS NOTES
Received patient for care from ICU, heparin infusing to right forearm, telemetry monitor on patient, VS taken, on 5L/NC - call light within reach, family at bedside, resting comfortably, will continue to monitor

## 2018-11-28 NOTE — ASSESSMENT & PLAN NOTE
-Patient who presents with acute decompensated CHF secondary to NSTEMI/MI  -Needs IV diuresis  -Strict I's/O's  -Assess response  -Continue other meds  -No ACEI/ARB given underlying renal function  -Nephrology on board    11/28/18  -Clinically improved  -Lasix decreased to once daily given borderline BP  -Continue other meds  -No ACEI/ARB given renal function  -Appreciate nephrology assistance

## 2018-11-28 NOTE — ASSESSMENT & PLAN NOTE
11/28 elevated systolic PA pressure likely related to left heart disease, continue diuresis.  Will evaluate for home  O2 on discharge

## 2018-11-28 NOTE — ASSESSMENT & PLAN NOTE
Cardiac monitoring  IV heparin, aspirin , statin , beta-blocker, cardiology follow-up  11/28 same.  Monitor PTT

## 2018-11-28 NOTE — SUBJECTIVE & OBJECTIVE
Interval History:     11/28/18 - no acute events, denies complaints ,     Review of patient's allergies indicates:   Allergen Reactions    Codeine Swelling    Darvon [propoxyphene] Swelling     Current Facility-Administered Medications   Medication Frequency    0.9%  NaCl infusion (for blood administration) Q24H PRN    acetaminophen tablet 650 mg Q6H PRN    [START ON 11/29/2018] amLODIPine tablet 5 mg Daily    aspirin EC tablet 81 mg Daily    atorvastatin tablet 80 mg Daily    calcifediol Cs24 30 mcg Daily    dextrose 50% injection 12.5 g PRN    furosemide injection 80 mg BID    glucagon (human recombinant) injection 1 mg PRN    heparin 25,000 units in dextrose 5% (100 units/ml) IV bolus from bag - ADDITIONAL PRN BOLUS - 30 units/kg (max bolus 4000 units) PRN    heparin 25,000 units in dextrose 5% (100 units/ml) IV bolus from bag - ADDITIONAL PRN BOLUS - 60 units/kg (max bolus 4000 units) PRN    heparin 25,000 units in dextrose 5% 250 mL (100 units/mL) infusion LOW INTENSITY nomogram - [facility] Continuous    hydrALAZINE tablet 25 mg BID    insulin aspart U-100 pen 0-5 Units Q6H PRN    isosorbide mononitrate 24 hr tablet 60 mg Daily    metoprolol tartrate (LOPRESSOR) tablet 25 mg BID    nitroGLYCERIN SL tablet 0.4 mg Q5 Min PRN    ondansetron injection 4 mg Q6H PRN    pantoprazole EC tablet 40 mg Daily    potassium chloride SA CR tablet 40 mEq Once    promethazine (PHENERGAN) 6.25 mg in dextrose 5 % 50 mL IVPB Q6H PRN    sodium bicarbonate tablet 325 mg TID       Objective:     Vital Signs (Most Recent):  Temp: 99.1 °F (37.3 °C) (11/28/18 0800)  Pulse: 70 (11/28/18 1000)  Resp: (!) 5 (11/28/18 1000)  BP: (!) 100/39 (11/28/18 1000)  SpO2: 96 % (11/28/18 1000)  O2 Device (Oxygen Therapy): Vapotherm (11/28/18 0950) Vital Signs (24h Range):  Temp:  [99.1 °F (37.3 °C)-101.2 °F (38.4 °C)] 99.1 °F (37.3 °C)  Pulse:  [70-86] 70  Resp:  [5-30] 5  SpO2:  [94 %-100 %] 96 %  BP: (100-136)/(30-69) 100/39      Weight: 68.8 kg (151 lb 10.8 oz) (11/28/18 0557)  Body mass index is 25.24 kg/m².  Body surface area is 1.78 meters squared.    I/O last 3 completed shifts:  In: 743.1 [P.O.:300; I.V.:100.6; Blood:322.9; IV Piggyback:19.6]  Out: 2780 [Urine:2780]    Physical Exam   Constitutional: She is oriented to person, place, and time. She appears well-developed.   HENT:   Head: Normocephalic and atraumatic.   Mouth/Throat: Oropharynx is clear and moist. No oropharyngeal exudate.   Eyes: Conjunctivae and EOM are normal. Pupils are equal, round, and reactive to light.   Right corneal opacity    Neck: Normal range of motion. Neck supple. No JVD present. Carotid bruit is not present. No tracheal deviation present. No thyroid mass and no thyromegaly present.   Cardiovascular: Normal rate, regular rhythm and intact distal pulses. Exam reveals no gallop and no friction rub.   Murmur heard.  Pulmonary/Chest: No respiratory distress. She has no wheezes. She exhibits no tenderness.   Abdominal: Soft. Bowel sounds are normal. She exhibits no distension, no abdominal bruit, no ascites and no mass. There is no hepatosplenomegaly. There is no tenderness. There is no rebound, no guarding and no CVA tenderness.   Musculoskeletal: She exhibits no edema or tenderness.   Lymphadenopathy:     She has no cervical adenopathy.   Neurological: She is alert and oriented to person, place, and time. No cranial nerve deficit. She exhibits normal muscle tone. Coordination normal.   Skin: Skin is warm and intact. No rash noted. No erythema. No pallor.       Significant Labs:  CBC:   Recent Labs   Lab 11/28/18  0611   WBC 8.46  8.46   RBC 2.97*  2.97*   HGB 9.0*  9.0*   HCT 27.3*  27.3*     182   MCV 92  92   MCH 30.3  30.3   MCHC 33.0  33.0     CMP:   Recent Labs   Lab 11/26/18  1807  11/28/18  0611   *   < > 190*  190*   CALCIUM 9.2   < > 8.3*  8.3*   ALBUMIN 3.4*  --   --    PROT 7.2  --   --       < > 139  139   K 4.1    < > 3.5  3.5   CO2 17*   < > 20*  20*      < > 106  106   BUN 69*   < > 69*  69*   CREATININE 4.1*   < > 4.2*  4.2*   ALKPHOS 84  --   --    ALT 13  --   --    AST 25  --   --    BILITOT 0.8  --   --     < > = values in this interval not displayed.     Coagulation:   Recent Labs   Lab 11/26/18 1807 11/28/18  0611   INR 0.9  --   --    APTT 23.9   < > 37.9*    < > = values in this interval not displayed.     LFTs:   Recent Labs   Lab 11/26/18 1807   ALT 13   AST 25   ALKPHOS 84   BILITOT 0.8   PROT 7.2   ALBUMIN 3.4*     All labs within the past 24 hours have been reviewed.     Significant Imaging: reviewed    Lab Results   Component Value Date    .0 (H) 01/23/2018    CALCIUM 8.3 (L) 11/28/2018    CALCIUM 8.3 (L) 11/28/2018    PHOS 3.6 07/31/2018

## 2018-11-28 NOTE — SUBJECTIVE & OBJECTIVE
Interval History:     Slight improvement  ROS  Objective:     Vital Signs (Most Recent):  Temp: 99.1 °F (37.3 °C) (11/28/18 0800)  Pulse: 70 (11/28/18 1000)  Resp: (!) 5 (11/28/18 1000)  BP: (!) 100/39 (11/28/18 1000)  SpO2: (S) 99 % (11/28/18 1159) Vital Signs (24h Range):  Temp:  [99.1 °F (37.3 °C)-101.2 °F (38.4 °C)] 99.1 °F (37.3 °C)  Pulse:  [70-86] 70  Resp:  [5-30] 5  SpO2:  [95 %-100 %] 99 %  BP: (100-136)/(30-69) 100/39     Weight: 68.8 kg (151 lb 10.8 oz)  Body mass index is 25.24 kg/m².      Intake/Output Summary (Last 24 hours) at 11/28/2018 1224  Last data filed at 11/28/2018 1100  Gross per 24 hour   Intake 1003.28 ml   Output 1590 ml   Net -586.72 ml       Physical Exam   Constitutional: She is oriented to person, place, and time. She appears well-developed and well-nourished. No distress.   HENT:   Head: Normocephalic and atraumatic.   On Vapotherm   Eyes: EOM are normal.   Neck: Neck supple.   Cardiovascular: Normal rate and regular rhythm.   Pulmonary/Chest: Effort normal. No respiratory distress. She has rales. She exhibits no tenderness.   Abdominal: Soft. There is no tenderness.   Genitourinary:   Genitourinary Comments: Pfeiffer in place   Musculoskeletal: She exhibits edema.   Trace edema bilateral lower extremity   Neurological: She is alert and oriented to person, place, and time.   Skin: Skin is warm.   Psychiatric: She has a normal mood and affect. Her behavior is normal. Judgment and thought content normal.   Nursing note and vitals reviewed.      Vents:  Oxygen Concentration (%): 45 (11/28/18 1000)    Lines/Drains/Airways     Drain                 Urethral Catheter 11/26/18 1823 Latex 16 Fr. 1 day          Peripheral Intravenous Line                 Peripheral IV - Single Lumen 11/26/18 1806 Right Hand 1 day         Peripheral IV - Single Lumen 11/26/18 1916 Right Antecubital 1 day                Significant Labs:    CBC/Anemia Profile:  Recent Labs   Lab 11/26/18  1807 11/27/18  0597  11/28/18  0611   WBC 10.37 9.61 8.46  8.46   HGB 10.1* 8.2* 9.0*  9.0*   HCT 31.3* 25.3* 27.3*  27.3*    186 182  182   MCV 94 95 92  92   RDW 13.2 13.2 14.9*  14.9*        Chemistries:  Recent Labs   Lab 11/26/18  1807 11/27/18  0546 11/28/18  0611    143 139  139   K 4.1 4.1 3.5  3.5    109 106  106   CO2 17* 21* 20*  20*   BUN 69* 68* 69*  69*   CREATININE 4.1* 4.0* 4.2*  4.2*   CALCIUM 9.2 8.6* 8.3*  8.3*   ALBUMIN 3.4*  --   --    PROT 7.2  --   --    BILITOT 0.8  --   --    ALKPHOS 84  --   --    ALT 13  --   --    AST 25  --   --    Echo 11/27/2018    1 - Wall motion abnormalities.     2 - Mildly depressed left ventricular systolic function (EF 45-50%).     3 - Impaired LV relaxation, normal LAP (grade 1 diastolic dysfunction).     4 - Normal right ventricular systolic function .     5 - Pulmonary hypertension. The estimated PA systolic pressure is 69 mmHg.     6 - Mild to moderate mitral regurgitation.     7 - Mild tricuspid regurgitation.     8 - Intermediate central venous pressure.     9 - Right pleural effusion.     Significant Imaging:  CXR: I have reviewed all pertinent results/findings within the past 24 hours and my personal findings are:  Improved bilateral pulmonary edema.  Bilateral mid lung fields alveolar opacities

## 2018-11-28 NOTE — ASSESSMENT & PLAN NOTE
P.o. bicarb.  Strict I&Os.  Monitor creatinine bicarb level.  11/28 continue p.o. bicarb.  Strict I&Os.  Monitor BMP

## 2018-11-28 NOTE — ASSESSMENT & PLAN NOTE
-Patient who presented in fulminant pulmonary edema/decompensated CHF in setting of MI  -Troponin 4.417>15.866>16.308  -Needs IV diuresis/optimization of medical therapy  -Continue ASA, amlodipine, statin, hydralazine, lopressor  -Add oral nitrates  -Continue heparin gtt for anticoagulation  -Will need R/LHC once optimized. Patient is at high risk of contrast-induced nephropathy/transition to dialysis. All risks, benefits, and treatment alternatives explained to patient in detail.  -Nephrology on board, appreciate assistance     11/28/18  -Stable overnight  -Troponin trending down  -Volume status improved  -No chest pain  -Continue ASA, amlodipine, statin, hydralazine, BB, imdur  -+Temp noted; Will plan on L/RHC for further evaluation once afebrile and compensated  -NPO after MN

## 2018-11-28 NOTE — EICU
Admitted with chest pain and found to have NSTEMI.  PMH/O:DM,HTN,CKD(cr 4)    On camera assessment alert and awake and resting quietly  PR79,/74,URL932% vapotherm 55%,RR 22,Temp 101.2    Data:  Troponin 11.162  [id number]  Chest xray dense  hilar venous congestion   Echo wall motion abnormality   EF 45-50%  EKG STT depression in anterolateral leads  Cr 4.1    Assessment and plan:  1.NSTEMI-ASA,Antilipid,metoprolol,heparin infusion, awaits [facility],continue oxygen,may need Bipap  2.CRF-nephrology recommendations,may need CRRT after Angiogram  3.HTN-0strict control  4.DM-strict control

## 2018-11-28 NOTE — PROGRESS NOTES
[facility]  Nephrology  Progress Note    Patient Name: Avril Monzon  MRN: [id number]  Admission Date: 11/26/2018  Hospital Length of Stay: 2 days  Attending Provider: Zi Landon MD   Primary Care Physician: Rose Parks MD  Principal Problem:NSTEMI (non-ST elevated myocardial infarction)    Subjective:     HPI: Avril Monzon is a pleasant 73-year-old  woman with history of hypertension, type 2 diabetes, CKD stage 4, followed in our clinic by Dr. Doe, baseline serum creatinine about 2.5-3, patient also has coronary artery disease, she presents to the emergency room with some chest discomfort and shortness of breath, patient diagnosed with NSTEMI, also acute on chronic kidney disease, serum creatinine at 4 mg/dL, likely due to hemodynamic reasons, we were consulted for acute on chronic renal failure, possible need for left heart catheterization,    Interval History:     11/28/18 - no acute events, denies complaints ,     Review of patient's allergies indicates:   Allergen Reactions    Codeine Swelling    Darvon [propoxyphene] Swelling     Current Facility-Administered Medications   Medication Frequency    0.9%  NaCl infusion (for blood administration) Q24H PRN    acetaminophen tablet 650 mg Q6H PRN    [START ON 11/29/2018] amLODIPine tablet 5 mg Daily    aspirin EC tablet 81 mg Daily    atorvastatin tablet 80 mg Daily    calcifediol Cs24 30 mcg Daily    dextrose 50% injection 12.5 g PRN    furosemide injection 80 mg BID    glucagon (human recombinant) injection 1 mg PRN    heparin 25,000 units in dextrose 5% (100 units/ml) IV bolus from bag - ADDITIONAL PRN BOLUS - 30 units/kg (max bolus 4000 units) PRN    heparin 25,000 units in dextrose 5% (100 units/ml) IV bolus from bag - ADDITIONAL PRN BOLUS - 60 units/kg (max bolus 4000 units) PRN    heparin 25,000 units in dextrose 5% 250 mL (100 units/mL) infusion LOW INTENSITY nomogram - [facility] Continuous    hydrALAZINE  tablet 25 mg BID    insulin aspart U-100 pen 0-5 Units Q6H PRN    isosorbide mononitrate 24 hr tablet 60 mg Daily    metoprolol tartrate (LOPRESSOR) tablet 25 mg BID    nitroGLYCERIN SL tablet 0.4 mg Q5 Min PRN    ondansetron injection 4 mg Q6H PRN    pantoprazole EC tablet 40 mg Daily    potassium chloride SA CR tablet 40 mEq Once    promethazine (PHENERGAN) 6.25 mg in dextrose 5 % 50 mL IVPB Q6H PRN    sodium bicarbonate tablet 325 mg TID       Objective:     Vital Signs (Most Recent):  Temp: 99.1 °F (37.3 °C) (11/28/18 0800)  Pulse: 70 (11/28/18 1000)  Resp: (!) 5 (11/28/18 1000)  BP: (!) 100/39 (11/28/18 1000)  SpO2: 96 % (11/28/18 1000)  O2 Device (Oxygen Therapy): Vapotherm (11/28/18 0950) Vital Signs (24h Range):  Temp:  [99.1 °F (37.3 °C)-101.2 °F (38.4 °C)] 99.1 °F (37.3 °C)  Pulse:  [70-86] 70  Resp:  [5-30] 5  SpO2:  [94 %-100 %] 96 %  BP: (100-136)/(30-69) 100/39     Weight: 68.8 kg (151 lb 10.8 oz) (11/28/18 0557)  Body mass index is 25.24 kg/m².  Body surface area is 1.78 meters squared.    I/O last 3 completed shifts:  In: 743.1 [P.O.:300; I.V.:100.6; Blood:322.9; IV Piggyback:19.6]  Out: 2780 [Urine:2780]    Physical Exam   Constitutional: She is oriented to person, place, and time. She appears well-developed.   HENT:   Head: Normocephalic and atraumatic.   Mouth/Throat: Oropharynx is clear and moist. No oropharyngeal exudate.   Eyes: Conjunctivae and EOM are normal. Pupils are equal, round, and reactive to light.   Right corneal opacity    Neck: Normal range of motion. Neck supple. No JVD present. Carotid bruit is not present. No tracheal deviation present. No thyroid mass and no thyromegaly present.   Cardiovascular: Normal rate, regular rhythm and intact distal pulses. Exam reveals no gallop and no friction rub.   Murmur heard.  Pulmonary/Chest: No respiratory distress. She has no wheezes. She exhibits no tenderness.   Abdominal: Soft. Bowel sounds are normal. She exhibits no distension,  no abdominal bruit, no ascites and no mass. There is no hepatosplenomegaly. There is no tenderness. There is no rebound, no guarding and no CVA tenderness.   Musculoskeletal: She exhibits no edema or tenderness.   Lymphadenopathy:     She has no cervical adenopathy.   Neurological: She is alert and oriented to person, place, and time. No cranial nerve deficit. She exhibits normal muscle tone. Coordination normal.   Skin: Skin is warm and intact. No rash noted. No erythema. No pallor.       Significant Labs:  CBC:   Recent Labs   Lab 11/28/18 0611   WBC 8.46  8.46   RBC 2.97*  2.97*   HGB 9.0*  9.0*   HCT 27.3*  27.3*     182   MCV 92  92   MCH 30.3  30.3   MCHC 33.0  33.0     CMP:   Recent Labs   Lab 11/26/18 1807 11/28/18 0611   *   < > 190*  190*   CALCIUM 9.2   < > 8.3*  8.3*   ALBUMIN 3.4*  --   --    PROT 7.2  --   --       < > 139  139   K 4.1   < > 3.5  3.5   CO2 17*   < > 20*  20*      < > 106  106   BUN 69*   < > 69*  69*   CREATININE 4.1*   < > 4.2*  4.2*   ALKPHOS 84  --   --    ALT 13  --   --    AST 25  --   --    BILITOT 0.8  --   --     < > = values in this interval not displayed.     Coagulation:   Recent Labs   Lab 11/26/18 1807 11/28/18 0611   INR 0.9  --   --    APTT 23.9   < > 37.9*    < > = values in this interval not displayed.     LFTs:   Recent Labs   Lab 11/26/18 1807   ALT 13   AST 25   ALKPHOS 84   BILITOT 0.8   PROT 7.2   ALBUMIN 3.4*     All labs within the past 24 hours have been reviewed.     Significant Imaging: reviewed    Lab Results   Component Value Date    .0 (H) 01/23/2018    CALCIUM 8.3 (L) 11/28/2018    CALCIUM 8.3 (L) 11/28/2018    PHOS 3.6 07/31/2018         Assessment/Plan:     GREGG on CKD, stage IV    1. GREGG on CKD stage 4 :   baseline creatinine about 2.5-3, serum creatinine on presentation was 4 mg/dL, 4.2 today ,       acute on chronic kidney disease likely due to hemodynamic reasons due to acute MI,    discussed  about risks/benefits of left heart catheterization, IV contrast exposure, possibility of worsening renal function requiring renal replacement therapy, patient understands the risks and agrees to proceed with the plan, if possible can do gentle hydration and hour before and 6 hr after after LHC ,     with acute decompensated heart failure IV fluids have to be used with caution.  Patient interested in peritoneal dialysis when indicated,    2.  Hypertension - BP on low side, adjust dose of meds, change Lasix to daily,     3. NSTEMI - continue medications, LHC per Cardiology,    4. Anemia of CKD :  Follow hemoglobin, consider PRBC transfusion if indicated due to acute MI, can give some IV furosemide prior transfusion,    5. Stable lytes     6. ADHF : due to AMI , clinically better , will reduce lasix to daily    7. Fever - cxs negative so far , ? PNA    8. Met acidosis - on bicarb supplements             I will follow-up with patient. Please contact us if you have any additional questions.     Total time spent 40 minutes including time needed to review the records,  patient  evaluation, documentation, face-to-face discussion with the patient,  primary, Cards and CC teams,   more than 50% of the time was spent on coordination of care and counseling.       Leonard Bryan MD  Nephrology  [facility]

## 2018-11-28 NOTE — PLAN OF CARE
Problem: Patient Care Overview  Goal: Plan of Care Review  Outcome: Ongoing (interventions implemented as appropriate)  POC and interventions discussed with patient and family - VU.  Scheduled to have L Heart Cath. Later on today.  AAO x 4.  Denies CP.  On heparin gtt, no s/s of bleeding noted. See labs for PTT.  Oxygenating well on VT.  Denies SOB or increased WOB.  Pfeiffer with CYU.

## 2018-11-28 NOTE — HOSPITAL COURSE
11/28/18-Patient seen and examined today, sitting up in bed. Feels better. SOB improving. Remains chest pain free. Diuresed well. Temp of 101 noted overnight. Labs reviewed. Creatinine 4.2.     11/29/18-Patient seen and examined today. Still mildly SOB. No chest pain. Afebrile overnight. Creatinine 4.3.    12/1/18-Patient seen and examined today, s/p LHC yesterday that showed 90% proximal LAD stenosis, 50% proximal LCX stenosis, and diffuse diseased RCA. Feels well. No complaints. Denies chest pain or SOB. Labs reviewed. Creatinine stable at 4.1. CVT consulted, not amenable to CABG due to severe calcification and high risk of morbidity and mortality.     12/2/18-Patient seen and examined today, sitting up in bed. Feels well. No cardiac complaints. Denies chest pain or SOB. Labs reviewed. Creatinine 4.9. Repeat C planned for tmw with intervention of LAD.    12/3/18- Patient in bed resting comfortably, awaiting LHC today after being turned down by CABG. Has no chest pain or angina equivalent. NS infusing. Labs and vitals stable. Is NPO     12/4/18- Patient is s/p successful PCI to LAD. Tolerated procedure well. Renal function stable this morning. Plans in place for OP Nephrology follow up and HD access implant. No need for HD at this time, but will need. BP stable. Has no chest pain or angina equivalent.

## 2018-11-28 NOTE — PLAN OF CARE
11/28/18 1205   Medicare Message   Important Message from Medicare regarding Discharge Appeal Rights Given to patient/caregiver;Explained to patient/caregiver;Signed/date by patient/caregiver   Date IMM was signed 11/28/18   Time IMM was signed 1204

## 2018-11-28 NOTE — ASSESSMENT & PLAN NOTE
1. GREGG on CKD stage 4 :   baseline creatinine about 2.5-3, serum creatinine on presentation was 4 mg/dL, 4.2 today ,       acute on chronic kidney disease likely due to hemodynamic reasons due to acute MI,    discussed about risks/benefits of left heart catheterization, IV contrast exposure, possibility of worsening renal function requiring renal replacement therapy, patient understands the risks and agrees to proceed with the plan, if possible can do gentle hydration and hour before and 6 hr after after LHC ,     with acute decompensated heart failure IV fluids have to be used with caution.  Patient interested in peritoneal dialysis when indicated,    2.  Hypertension - BP on low side, adjust dose of meds, change Lasix to daily,     3. NSTEMI - continue medications, LHC per Cardiology,    4. Anemia of CKD :  Follow hemoglobin, consider PRBC transfusion if indicated due to acute MI, can give some IV furosemide prior transfusion,    5. Stable lytes     6. ADHF : due to AMI , clinically better , will reduce lasix to daily    7. Fever - cxs negative so far , ? PNA    8. Met acidosis - on bicarb supplements

## 2018-11-28 NOTE — ASSESSMENT & PLAN NOTE
-Continue amlodipine, Lopressor, hydralazine  -Add Isosorbide mononitrate  -Monitor, BP borderline low today

## 2018-11-28 NOTE — ED NOTES
Risks and benefits of blood transfusions reviewed with patient. Patient verbalized understanding. Blood consent signed by patient and nurse. [facility] medicine secure chatted to come sign consent to transfuse 1 unit. Will continue to monitor.

## 2018-11-28 NOTE — SUBJECTIVE & OBJECTIVE
Review of Systems   Constitution: Positive for weakness and malaise/fatigue.   HENT: Negative.    Eyes: Negative.    Cardiovascular: Positive for dyspnea on exertion.   Respiratory: Positive for shortness of breath (improved).    Endocrine: Negative.    Hematologic/Lymphatic: Negative.    Skin: Negative.    Musculoskeletal: Negative.    Gastrointestinal: Negative.    Genitourinary: Negative.    Psychiatric/Behavioral: Negative.    Allergic/Immunologic: Negative.      Objective:     Vital Signs (Most Recent):  Temp: 99.1 °F (37.3 °C) (11/28/18 0800)  Pulse: 70 (11/28/18 1000)  Resp: (!) 5 (11/28/18 1000)  BP: (!) 100/39 (11/28/18 1000)  SpO2: (S) 99 % (11/28/18 1159) Vital Signs (24h Range):  Temp:  [99.1 °F (37.3 °C)-101.2 °F (38.4 °C)] 99.1 °F (37.3 °C)  Pulse:  [70-86] 70  Resp:  [5-30] 5  SpO2:  [95 %-100 %] 99 %  BP: (100-136)/(30-69) 100/39     Weight: 68.8 kg (151 lb 10.8 oz)  Body mass index is 25.24 kg/m².     SpO2: (S) 99 %  O2 Device (Oxygen Therapy): nasal cannula      Intake/Output Summary (Last 24 hours) at 11/28/2018 1237  Last data filed at 11/28/2018 1100  Gross per 24 hour   Intake 1003.28 ml   Output 1590 ml   Net -586.72 ml       Lines/Drains/Airways     Drain                 Urethral Catheter 11/26/18 1823 Latex 16 Fr. 1 day          Peripheral Intravenous Line                 Peripheral IV - Single Lumen 11/26/18 1806 Right Hand 1 day         Peripheral IV - Single Lumen 11/26/18 1916 Right Antecubital 1 day                Physical Exam   Constitutional: She is oriented to person, place, and time. She appears well-developed and well-nourished. No distress.   HENT:   Head: Normocephalic and atraumatic.   Eyes: Pupils are equal, round, and reactive to light. Right eye exhibits no discharge. Left eye exhibits no discharge.   Neck: Neck supple. JVD present. No thyromegaly present.   Cardiovascular: Normal rate, regular rhythm, normal heart sounds and intact distal pulses. PMI is not displaced.  Exam reveals no gallop, no S3, no S4 and no friction rub.   No murmur heard.  Pulmonary/Chest: Effort normal. No respiratory distress. She has rales (faint at bases).   Abdominal: Soft. She exhibits no distension.   Musculoskeletal: She exhibits no edema.   Neurological: She is alert and oriented to person, place, and time.   Skin: Skin is warm and dry. She is not diaphoretic. No erythema.   Psychiatric: She has a normal mood and affect. Her behavior is normal. Thought content normal.   Nursing note and vitals reviewed.      Significant Labs:   CMP   Recent Labs   Lab 11/26/18  1807 11/27/18  0546 11/28/18  0611    143 139  139   K 4.1 4.1 3.5  3.5    109 106  106   CO2 17* 21* 20*  20*   * 241* 190*  190*   BUN 69* 68* 69*  69*   CREATININE 4.1* 4.0* 4.2*  4.2*   CALCIUM 9.2 8.6* 8.3*  8.3*   PROT 7.2  --   --    ALBUMIN 3.4*  --   --    BILITOT 0.8  --   --    ALKPHOS 84  --   --    AST 25  --   --    ALT 13  --   --    ANIONGAP 14 13 13  13   ESTGFRAFRICA 12* 12* 12*  12*   EGFRNONAA 10* 11* 10*  10*   , CBC   Recent Labs   Lab 11/26/18  1807 11/27/18  0546 11/28/18  0611   WBC 10.37 9.61 8.46  8.46   HGB 10.1* 8.2* 9.0*  9.0*   HCT 31.3* 25.3* 27.3*  27.3*    186 182  182   , Troponin   Recent Labs   Lab 11/26/18  2359 11/27/18  0546 11/27/18  1358   TROPONINI 15.866* 16.308* 11.162*    and All pertinent lab results from the last 24 hours have been reviewed.    Significant Imaging: Echocardiogram:   2D echo with color flow doppler:   Results for orders placed or performed during the hospital encounter of 11/26/18   2D echo with color flow doppler   Result Value Ref Range    QEF 45 55 - 65    Mitral Valve Regurgitation MILD TO MODERATE     Diastolic Dysfunction Yes (A)     Est. PA Systolic Pressure 69.15 (A)     Tricuspid Valve Regurgitation MILD     and X-Ray: CXR: X-Ray Chest 1 View (CXR): No results found for this visit on 11/26/18. and X-Ray Chest PA and Lateral (CXR):  No results found for this visit on 11/26/18.

## 2018-11-28 NOTE — ASSESSMENT & PLAN NOTE
Strict I&Os, IV Lasix 80 mg q.12 hours.  Repeat chest x-ray in a.m.  Vapotherm keep O2 sat above 89%, BiPAP p.r.n. Distress  11/28 wean O2 , BiPAP p.r.n., continue IV Lasix 80 mg q.12 hours.  Strict I&Os.

## 2018-11-29 LAB
ANION GAP SERPL CALC-SCNC: 11 MMOL/L
APTT BLDCRRT: 25.4 SEC
APTT BLDCRRT: 38.2 SEC
APTT BLDCRRT: 39.5 SEC
BASOPHILS # BLD AUTO: 0.01 K/UL
BASOPHILS NFR BLD: 0.1 %
BUN SERPL-MCNC: 75 MG/DL
CALCIUM SERPL-MCNC: 9 MG/DL
CHLORIDE SERPL-SCNC: 107 MMOL/L
CO2 SERPL-SCNC: 23 MMOL/L
CREAT SERPL-MCNC: 4.3 MG/DL
DIFFERENTIAL METHOD: ABNORMAL
EOSINOPHIL # BLD AUTO: 0.1 K/UL
EOSINOPHIL NFR BLD: 1.8 %
ERYTHROCYTE [DISTWIDTH] IN BLOOD BY AUTOMATED COUNT: 14.5 %
EST. GFR  (AFRICAN AMERICAN): 11 ML/MIN/1.73 M^2
EST. GFR  (NON AFRICAN AMERICAN): 10 ML/MIN/1.73 M^2
GLUCOSE SERPL-MCNC: 112 MG/DL
HCT VFR BLD AUTO: 27.7 %
HGB BLD-MCNC: 9 G/DL
LYMPHOCYTES # BLD AUTO: 1.1 K/UL
LYMPHOCYTES NFR BLD: 14.3 %
MCH RBC QN AUTO: 30 PG
MCHC RBC AUTO-ENTMCNC: 32.5 G/DL
MCV RBC AUTO: 92 FL
MONOCYTES # BLD AUTO: 0.5 K/UL
MONOCYTES NFR BLD: 6.4 %
NEUTROPHILS # BLD AUTO: 5.7 K/UL
NEUTROPHILS NFR BLD: 77.4 %
PLATELET # BLD AUTO: 199 K/UL
PMV BLD AUTO: 10.9 FL
POCT GLUCOSE: 115 MG/DL (ref 70–110)
POCT GLUCOSE: 143 MG/DL (ref 70–110)
POCT GLUCOSE: 210 MG/DL (ref 70–110)
POCT GLUCOSE: 295 MG/DL (ref 70–110)
POTASSIUM SERPL-SCNC: 4 MMOL/L
RBC # BLD AUTO: 3 M/UL
SODIUM SERPL-SCNC: 141 MMOL/L
WBC # BLD AUTO: 7.32 K/UL

## 2018-11-29 PROCEDURE — 25000003 PHARM REV CODE 250: Performed by: INTERNAL MEDICINE

## 2018-11-29 PROCEDURE — 25000003 PHARM REV CODE 250: Performed by: PHYSICIAN ASSISTANT

## 2018-11-29 PROCEDURE — 94761 N-INVAS EAR/PLS OXIMETRY MLT: CPT

## 2018-11-29 PROCEDURE — 27000221 HC OXYGEN, UP TO 24 HOURS

## 2018-11-29 PROCEDURE — 63600175 PHARM REV CODE 636 W HCPCS: Performed by: EMERGENCY MEDICINE

## 2018-11-29 PROCEDURE — 25000003 PHARM REV CODE 250: Performed by: EMERGENCY MEDICINE

## 2018-11-29 PROCEDURE — 63600175 PHARM REV CODE 636 W HCPCS: Performed by: NURSE PRACTITIONER

## 2018-11-29 PROCEDURE — 85730 THROMBOPLASTIN TIME PARTIAL: CPT | Mod: 91

## 2018-11-29 PROCEDURE — 85025 COMPLETE CBC W/AUTO DIFF WBC: CPT

## 2018-11-29 PROCEDURE — 21400001 HC TELEMETRY ROOM

## 2018-11-29 PROCEDURE — 85730 THROMBOPLASTIN TIME PARTIAL: CPT

## 2018-11-29 PROCEDURE — 99233 SBSQ HOSP IP/OBS HIGH 50: CPT | Mod: ,,, | Performed by: INTERNAL MEDICINE

## 2018-11-29 PROCEDURE — 99232 SBSQ HOSP IP/OBS MODERATE 35: CPT | Mod: ,,, | Performed by: INTERNAL MEDICINE

## 2018-11-29 PROCEDURE — 36415 COLL VENOUS BLD VENIPUNCTURE: CPT

## 2018-11-29 PROCEDURE — 25000003 PHARM REV CODE 250: Performed by: NURSE PRACTITIONER

## 2018-11-29 PROCEDURE — 80048 BASIC METABOLIC PNL TOTAL CA: CPT

## 2018-11-29 RX ORDER — HYDRALAZINE HYDROCHLORIDE 25 MG/1
25 TABLET, FILM COATED ORAL EVERY 8 HOURS
Status: DISCONTINUED | OUTPATIENT
Start: 2018-11-29 | End: 2018-12-04 | Stop reason: HOSPADM

## 2018-11-29 RX ORDER — CLOPIDOGREL BISULFATE 75 MG/1
75 TABLET ORAL DAILY
Status: DISCONTINUED | OUTPATIENT
Start: 2018-11-29 | End: 2018-12-04 | Stop reason: HOSPADM

## 2018-11-29 RX ADMIN — METOPROLOL TARTRATE 25 MG: 25 TABLET ORAL at 09:11

## 2018-11-29 RX ADMIN — ATORVASTATIN CALCIUM 80 MG: 40 TABLET, FILM COATED ORAL at 09:11

## 2018-11-29 RX ADMIN — SODIUM BICARBONATE TAB 325 MG 325 MG: 325 TAB at 09:11

## 2018-11-29 RX ADMIN — INSULIN DETEMIR 15 UNITS: 100 INJECTION, SOLUTION SUBCUTANEOUS at 09:11

## 2018-11-29 RX ADMIN — CLOPIDOGREL BISULFATE 75 MG: 75 TABLET ORAL at 04:11

## 2018-11-29 RX ADMIN — FUROSEMIDE 80 MG: 10 INJECTION, SOLUTION INTRAMUSCULAR; INTRAVENOUS at 06:11

## 2018-11-29 RX ADMIN — FUROSEMIDE 80 MG: 10 INJECTION, SOLUTION INTRAMUSCULAR; INTRAVENOUS at 09:11

## 2018-11-29 RX ADMIN — PANTOPRAZOLE SODIUM 40 MG: 40 TABLET, DELAYED RELEASE ORAL at 09:11

## 2018-11-29 RX ADMIN — HEPARIN SODIUM 16 UNITS/KG/HR: 10000 INJECTION, SOLUTION INTRAVENOUS at 02:11

## 2018-11-29 RX ADMIN — INSULIN ASPART 3 UNITS: 100 INJECTION, SOLUTION INTRAVENOUS; SUBCUTANEOUS at 09:11

## 2018-11-29 RX ADMIN — HYDRALAZINE HYDROCHLORIDE 25 MG: 25 TABLET, FILM COATED ORAL at 09:11

## 2018-11-29 RX ADMIN — INSULIN ASPART 4 UNITS: 100 INJECTION, SOLUTION INTRAVENOUS; SUBCUTANEOUS at 04:11

## 2018-11-29 RX ADMIN — ASPIRIN 81 MG: 81 TABLET, COATED ORAL at 09:11

## 2018-11-29 RX ADMIN — HEPARIN SODIUM 14 UNITS/KG/HR: 10000 INJECTION, SOLUTION INTRAVENOUS at 02:11

## 2018-11-29 RX ADMIN — ISOSORBIDE MONONITRATE 60 MG: 60 TABLET, EXTENDED RELEASE ORAL at 09:11

## 2018-11-29 RX ADMIN — SODIUM BICARBONATE TAB 325 MG 325 MG: 325 TAB at 02:11

## 2018-11-29 RX ADMIN — AMLODIPINE BESYLATE 5 MG: 5 TABLET ORAL at 09:11

## 2018-11-29 RX ADMIN — HYDRALAZINE HYDROCHLORIDE 25 MG: 25 TABLET, FILM COATED ORAL at 02:11

## 2018-11-29 NOTE — SUBJECTIVE & OBJECTIVE
Interval History: pt stable on high flow oxygen.  Pt denies any acute distress at this time.  LHC planned for tomorrow per Cardiology.  PRBCs x 1 unit given.  Cardiology, Pulmonology, and Nephrology following.   11/28- she feels better, remains on vapotherm ,   Review of Systems   Constitutional: Positive for activity change and fatigue. Negative for appetite change and fever.   HENT: Negative for congestion, facial swelling, sore throat, trouble swallowing and voice change.    Eyes: Negative for redness and visual disturbance.   Respiratory: Positive for shortness of breath. Negative for apnea, cough, chest tightness and wheezing.    Cardiovascular: Positive for chest pain (improved). Negative for palpitations and leg swelling.   Gastrointestinal: Negative for abdominal distention, abdominal pain, blood in stool, constipation, diarrhea, nausea and vomiting.   Genitourinary: Negative for decreased urine volume, difficulty urinating, dysuria, flank pain, frequency, hematuria, pelvic pain and urgency.   Musculoskeletal: Negative for back pain, gait problem and joint swelling.   Skin: Negative for color change and rash.   Neurological: Positive for weakness. Negative for dizziness, syncope and headaches.   Hematological: Does not bruise/bleed easily.   Psychiatric/Behavioral: Negative for agitation, behavioral problems and confusion. The patient is not nervous/anxious.      Objective:     Vital Signs (Most Recent):  Temp: 98.7 °F (37.1 °C) (11/29/18 0725)  Pulse: 77 (11/29/18 0725)  Resp: 20 (11/29/18 0725)  BP: (!) 132/92 (11/29/18 0725)  SpO2: 96 % (11/29/18 0725) Vital Signs (24h Range):  Temp:  [98.5 °F (36.9 °C)-100.3 °F (37.9 °C)] 98.7 °F (37.1 °C)  Pulse:  [70-80] 77  Resp:  [5-21] 20  SpO2:  [92 %-99 %] 96 %  BP: ()/(29-92) 132/92     Weight: 68.2 kg (150 lb 5.7 oz)  Body mass index is 25.02 kg/m².    Intake/Output Summary (Last 24 hours) at 11/29/2018 0703  Last data filed at 11/29/2018 0500  Gross per  24 hour   Intake 454.56 ml   Output 1155 ml   Net -700.44 ml      Physical Exam   Constitutional: She is oriented to person, place, and time. She appears well-developed and well-nourished. No distress.   HENT:   Head: Normocephalic and atraumatic.   On Vapotherm   Eyes: EOM are normal.   Neck: Neck supple.   Cardiovascular: Normal rate and regular rhythm.   Pulmonary/Chest: Tachypnea noted. She has no rales.   -   Abdominal: Soft. There is no tenderness.   Genitourinary:   Genitourinary Comments: Pfeiffer in place   Musculoskeletal: She exhibits edema.   Trace edema bilateral lower extremity   Neurological: She is alert and oriented to person, place, and time.   Skin: Skin is warm.   Psychiatric: She has a normal mood and affect. Her behavior is normal. Judgment and thought content normal.   Nursing note and vitals reviewed.      Significant Labs:   Blood Culture:   No results for input(s): LABBLOO in the last 48 hours.  CBC:   Recent Labs   Lab 11/28/18  0611   WBC 8.46  8.46   HGB 9.0*  9.0*   HCT 27.3*  27.3*     182     CMP:   Recent Labs   Lab 11/28/18  0611 11/29/18  0534     139 141   K 3.5  3.5 4.0     106 107   CO2 20*  20* 23   *  190* 112*   BUN 69*  69* 75*   CREATININE 4.2*  4.2* 4.3*   CALCIUM 8.3*  8.3* 9.0   ANIONGAP 13  13 11   EGFRNONAA 10*  10* 10*     Troponin:   Recent Labs   Lab 11/27/18  1358   TROPONINI 11.162*       Significant Imaging:   Imaging Results          X-Ray Chest AP Portable (Final result)  Result time 11/26/18 17:53:35    Final result by Sebastien Pettit III, MD (11/26/18 17:53:35)                 Impression:      Probable bilateral perihilar edema/congestion although cannot exclude pneumonia.  Follow-up recommended      Electronically signed by: Sebastien Pettit MD  Date:    11/26/2018  Time:    17:53             Narrative:    EXAMINATION:  XR CHEST AP PORTABLE    CLINICAL HISTORY:  Shortness of breath    COMPARISON:  May  2017    FINDINGS:  The heart size is normal.  Extensive bilateral infiltrates greatest in the perihilar Artem greater on the left.  This may be due to pulmonary edema, asymmetric however bilateral pneumonia cannot be excluded.  Follow-up recommended.

## 2018-11-29 NOTE — SUBJECTIVE & OBJECTIVE
Interval History:   Review of Systems   Constitutional: Negative for chills and fever.   HENT: Negative for hearing loss and nosebleeds.    Eyes: Negative for discharge.   Respiratory: Positive for shortness of breath. Negative for hemoptysis.    Cardiovascular: Negative for chest pain.   Gastrointestinal: Negative for blood in stool.   Genitourinary: Negative for hematuria.   Musculoskeletal: Negative for falls.   Skin: Negative for itching.   Neurological: Negative for seizures and loss of consciousness.   Endo/Heme/Allergies: Negative for polydipsia.   Psychiatric/Behavioral: Negative for memory loss.       Objective:     Vital Signs (Most Recent):  Temp: 99.3 °F (37.4 °C) (11/29/18 1132)  Pulse: 74 (11/29/18 1132)  Resp: 18 (11/29/18 1132)  BP: (!) 115/56 (11/29/18 1132)  SpO2: (!) 92 % (11/29/18 1132) Vital Signs (24h Range):  Temp:  [98.7 °F (37.1 °C)-100.3 °F (37.9 °C)] 99.3 °F (37.4 °C)  Pulse:  [70-80] 74  Resp:  [15-20] 18  SpO2:  [92 %-97 %] 92 %  BP: (103-132)/(47-92) 115/56     Weight: 68.2 kg (150 lb 5.7 oz)  Body mass index is 25.02 kg/m².      Intake/Output Summary (Last 24 hours) at 11/29/2018 1319  Last data filed at 11/29/2018 0500  Gross per 24 hour   Intake 147.2 ml   Output 800 ml   Net -652.8 ml       Physical Exam   Constitutional: She is oriented to person, place, and time. She appears well-developed and well-nourished. No distress. Nasal cannula in place.   HENT:   Head: Normocephalic and atraumatic.   Eyes: EOM are normal.   Neck: Neck supple.   Cardiovascular: Normal rate and regular rhythm.   Pulmonary/Chest: Effort normal. No respiratory distress. She has rales. She exhibits no tenderness.   Abdominal: Soft. There is no tenderness.   Musculoskeletal: She exhibits edema.   Trace edema bilateral lower extremity   Neurological: She is alert and oriented to person, place, and time.   Skin: Skin is warm.   Psychiatric: She has a normal mood and affect. Her behavior is normal. Judgment and  thought content normal.   Nursing note and vitals reviewed.      Vents:  Oxygen Concentration (%): 40 (11/29/18 0911)    Lines/Drains/Airways     Peripheral Intravenous Line                 Peripheral IV - Single Lumen 11/26/18 1806 Right Hand 2 days         Peripheral IV - Single Lumen 11/26/18 1916 Right Antecubital 2 days                Significant Labs:    CBC/Anemia Profile:  Recent Labs   Lab 11/28/18  0611 11/29/18  0534   WBC 8.46  8.46 7.32   HGB 9.0*  9.0* 9.0*   HCT 27.3*  27.3* 27.7*     182 199   MCV 92  92 92   RDW 14.9*  14.9* 14.5        Chemistries:  Recent Labs   Lab 11/28/18  0611 11/29/18  0534     139 141   K 3.5  3.5 4.0     106 107   CO2 20*  20* 23   BUN 69*  69* 75*   CREATININE 4.2*  4.2* 4.3*   CALCIUM 8.3*  8.3* 9.0     Echo :      1 - Wall motion abnormalities.     2 - Mildly depressed left ventricular systolic function (EF 45-50%).     3 - Impaired LV relaxation, normal LAP (grade 1 diastolic dysfunction).     4 - Normal right ventricular systolic function .     5 - Pulmonary hypertension. The estimated PA systolic pressure is 69 mmHg.     6 - Mild to moderate mitral regurgitation.     7 - Mild tricuspid regurgitation.     8 - Intermediate central venous pressure.     9 - Right pleural effusion.     Significant Imaging:  CXR: I have reviewed all pertinent results/findings within the past 24 hours and my personal findings are:  improved pulm edema

## 2018-11-29 NOTE — ASSESSMENT & PLAN NOTE
-Patient who presents with acute decompensated CHF secondary to NSTEMI/MI  -Needs IV diuresis  -Strict I's/O's  -Assess response  -Continue other meds  -No ACEI/ARB given underlying renal function  -Nephrology on board    11/28/18  -Clinically improved  -Lasix decreased to once daily given borderline BP  -Continue other meds  -No ACEI/ARB given renal function  -Appreciate nephrology assistance    11/29/18  -Improving  -Continue additional day of IV diuresis  -No ACEI/ARB given renal function  -Nephrology on board

## 2018-11-29 NOTE — PHYSICIAN QUERY
"PT Name: Avril Monzon  MR #: [id number]    Physician Query Form - Heart  Condition Clarification     CDS/: Laurence Smith               Contact information: [email or web address]    This form is a permanent document in the medical record.     Query Date: November 29, 2018    By submitting this query, we are merely seeking further clarification of documentation. Please utilize your independent clinical judgment when addressing the question(s) below.    The medical record contains the following   Indicators     Supporting Clinical Findings Location in Medical Record   x BNP 1613  Lab 11/26    x EF EF 45-50% 2 D Echo 11/27     Radiology findings     x Echo Results   1 - Wall motion abnormalities.     2 - Mildly depressed left ventricular systolic function (EF 45-50%).     3 - Impaired LV relaxation, normal LAP (grade 1 diastolic dysfunction).     4 - Normal right ventricular systolic function .     5 - Pulmonary hypertension. The estimated PA systolic pressure is 69 mmHg.     6 - Mild to moderate mitral regurgitation.     7 - Mild tricuspid regurgitation.     8 - Intermediate central venous pressure.     9 - Right pleural effusion.  2 D Echo 11/27     "Ascites" documented      "SOB" or "ALVARADO" documented      "Hypoxia" documented     x Heart Failure documented Acute hypoxemic respiratory failure secondary to acute decompensated HF    - O2 sat stable on 60% FiO2 per BiPAP, wean as tolerated.  -BIPAP weaned to high flow oxygen   - IV lasix 120mg given in ED with ~1L diuresed.    - Continue aggressive diuresis with IV Lasix 80mg BID.  - Strict I&O's, daily weights,   - Na/fluid restriction.  -  2D echo results showed EF 45% with diastolic dysfunction, PAP 69, +WMA, and mild to moderate MR.   - Cardiology and Pulmonology following     11/28- continue diuresis ,wean off vapotherm as tolerated       HM progress note 11/28     "Edema" documented     x Diuretics/Meds furosemide injection 80 mg   Dose: 80 mg  Freq: 2 times " daily Route: IV  Start: 11/27/18 0900 MAR     Treatment:      Other:      Heart failure (HF) can be acute, chronic or both. It is generally further specificed as systolic, diastolic, or combined. Lastly, it is important to identify an underlying etiology if known or suspected.     Common clues to acute exacerbation:  Rapidly progressive symptoms (w/in 2 weeks of presentation), using IV diuretics to treat, using supplemental O2, pulmonary edema on Xray, MI w/in 4 weeks, and/or BNP >500    Systolic Heart Failure: is defined as chart documentation of a left ventricular ejection fraction (LVEF) less than 40%     Diastolic Heart Failure: is defined as a left ventricular ejection fraction (LVEF) greater than 40%   +      Evidence of diastolic dysfunction on echocardiography OR    Right heart catheterization wedge pressure above 12 mm Hg OR    Left heart catheterization left ventricular end diastolic pressure 18 mm Hg or above.    References: *American Heart Association    The clinical guidelines noted below are only system guidelines, and do not replace the providers clinical judgment.     Provider, please specify the diagnosis associated with above clinical findings    [ x  ] Acute Diastolic Heart Failure - New diagnosis.  EF > 40%  and acute HF symptoms documented  [   ] Acute on Chronic Diastolic Heart Failure -    Pre-existing diastoic HF diagnosis.  EF > 40%  and acute HF symptoms documented                                 [   ] Chronic Diastolic Heart Failure - Pre-existing diastolic HF diagnosis.  EF > 40%  without  acute HF symptoms documented  [   ] Other Type of Heart Failure (please specify type): _________________________  [   ] Heart Failure Ruled Out  [   ] Other (please specify): ___________________________________  [   ] Clinically Undetermined                          Please document in your progress notes daily for the duration of treatment until resolved and include in your discharge summary.

## 2018-11-29 NOTE — PROGRESS NOTES
[facility] Hospital Medicine  Progress Note    Patient Name: Avril Monzon  MRN: [id number]  Patient Class: IP- Inpatient   Admission Date: 11/26/2018  Length of Stay: 3 days  Attending Physician: Zi Landon MD  Primary Care Provider: Rose Parks MD        Subjective:     Principal Problem:NSTEMI (non-ST elevated myocardial infarction)    HPI:  Ms. Monzon is a 72 yo female with a PMHx of HTN, HLD, DM II, CKD stage IV, anemia of CKD, and h/o CVA.  She presented to the ED with c/o SOB that has progressively worsened since yesterday.  Associated nonproductive cough and subjective fever.  No aggravating or alleviating factors.  Denies any CP, palpitations, diaphoresis, N/V, orthopnea, PND, edema, weight gain, ABD pain, diarrhea, dysuria, decreased UOP, hematuria, back pain, HA, AMS, lightheadedness, dizziness, syncope, weakness, numbness, or chills.  Per EMS, patient hypoxic on arrival to scene with O2 sat 80% on RA.  She was given Duoneb x 1 and placed on 4L NC with improved O2 sat of 94%.  Upon arrival to ED, patient's O2 sat 82% on 4L NC.  She was placed on BiPAP with increased O2 sat of 100%.  Work-up in ED resulted cr. 4.1, BUN 69, glucose 336, BNP 1613, troponin 4.417, lactic 2.7 > 1.5.  ABG with pH 7.324, pCO2 31.5, pO2 144, HCO3 16.4, BE -10 on BiPAP.  CXR showed pulmonary vascular congestion, pulmonary edema, and small bilateral pleural effusions.  EKG showed normal sinus rhythm with inferior ST depression, no previous to compare.  ASA 325mg, IV lopressor 5mg, and IV Lasix 120mg given per ED.  Case discussed with Cardiology in ED and IV heparin drip initiated.  Hospital Medicine called for admission.  Currently, patient appears comfortable in NAD.  Reports SOB improved on BiPAP.  Denies any CP.  No h/o CAD.  Denies having stress test in the past.  2D echo in 6/2018 showed LVEF of 60-65%, normal diastolic function, and no significant valvular abnormalities.    (late entry  note)  Hospital Course:  Pt admitted to Inpatient status for NSTEMI with Troponin significantly elevated.  Cardiology consulted with Heparin infusion initiated.  EKG positive for ST depression.  Pt placed on BIPAP due to respiratory distress and later weaned to high flow oxygen.  Pulmonology consulted.  Chest xray verified pulmonary congestion with positive response to diuresis noted.  BNP elevated. Pt noted to renal failure with Creatinine of 4 and Nephrology consulted.  Baseline creatinine 2.5-3 noted.  Echo results showed EF 45% with diastolic dysfunction, PAP 69, +WMA, Pulm HTN, and mild to moderate MR.  [facility] planned for tomorrow. H/H 8.2/25.3 with 1 unit of PRBCs transfused.  Procalcitonin of 0.66.  Blood culture results pending.  Pt denies acute distress at this time.     Interval History: pt stable on high flow oxygen.  Pt denies any acute distress at this time.  [facility] planned for tomorrow per Cardiology.  PRBCs x 1 unit given.  Cardiology, Pulmonology, and Nephrology following.   11/28- she feels better, remains on vapotherm ,   Review of Systems   Constitutional: Positive for activity change and fatigue. Negative for appetite change and fever.   HENT: Negative for congestion, facial swelling, sore throat, trouble swallowing and voice change.    Eyes: Negative for redness and visual disturbance.   Respiratory: Positive for shortness of breath. Negative for apnea, cough, chest tightness and wheezing.    Cardiovascular: Positive for chest pain (improved). Negative for palpitations and leg swelling.   Gastrointestinal: Negative for abdominal distention, abdominal pain, blood in stool, constipation, diarrhea, nausea and vomiting.   Genitourinary: Negative for decreased urine volume, difficulty urinating, dysuria, flank pain, frequency, hematuria, pelvic pain and urgency.   Musculoskeletal: Negative for back pain, gait problem and joint swelling.   Skin: Negative for color change and rash.   Neurological: Positive  for weakness. Negative for dizziness, syncope and headaches.   Hematological: Does not bruise/bleed easily.   Psychiatric/Behavioral: Negative for agitation, behavioral problems and confusion. The patient is not nervous/anxious.      Objective:     Vital Signs (Most Recent):  Temp: 98.7 °F (37.1 °C) (11/29/18 0725)  Pulse: 77 (11/29/18 0725)  Resp: 20 (11/29/18 0725)  BP: (!) 132/92 (11/29/18 0725)  SpO2: 96 % (11/29/18 0725) Vital Signs (24h Range):  Temp:  [98.5 °F (36.9 °C)-100.3 °F (37.9 °C)] 98.7 °F (37.1 °C)  Pulse:  [70-80] 77  Resp:  [5-21] 20  SpO2:  [92 %-99 %] 96 %  BP: ()/(29-92) 132/92     Weight: 68.2 kg (150 lb 5.7 oz)  Body mass index is 25.02 kg/m².    Intake/Output Summary (Last 24 hours) at 11/29/2018 0747  Last data filed at 11/29/2018 0500  Gross per 24 hour   Intake 454.56 ml   Output 1155 ml   Net -700.44 ml      Physical Exam   Constitutional: She is oriented to person, place, and time. She appears well-developed and well-nourished. No distress.   HENT:   Head: Normocephalic and atraumatic.   On Vapotherm   Eyes: EOM are normal.   Neck: Neck supple.   Cardiovascular: Normal rate and regular rhythm.   Pulmonary/Chest: Tachypnea noted. She has no rales.   -   Abdominal: Soft. There is no tenderness.   Genitourinary:   Genitourinary Comments: Pfeiffer in place   Musculoskeletal: She exhibits edema.   Trace edema bilateral lower extremity   Neurological: She is alert and oriented to person, place, and time.   Skin: Skin is warm.   Psychiatric: She has a normal mood and affect. Her behavior is normal. Judgment and thought content normal.   Nursing note and vitals reviewed.      Significant Labs:   Blood Culture:   No results for input(s): LABBLOO in the last 48 hours.  CBC:   Recent Labs   Lab 11/28/18  0611   WBC 8.46  8.46   HGB 9.0*  9.0*   HCT 27.3*  27.3*     182     CMP:   Recent Labs   Lab 11/28/18  0611 11/29/18  0534     139 141   K 3.5  3.5 4.0     106 107    CO2 20*  20* 23   *  190* 112*   BUN 69*  69* 75*   CREATININE 4.2*  4.2* 4.3*   CALCIUM 8.3*  8.3* 9.0   ANIONGAP 13  13 11   EGFRNONAA 10*  10* 10*     Troponin:   Recent Labs   Lab 11/27/18  1358   TROPONINI 11.162*       Significant Imaging:   Imaging Results          X-Ray Chest AP Portable (Final result)  Result time 11/26/18 17:53:35    Final result by Sebastien Pettit III, MD (11/26/18 17:53:35)                 Impression:      Probable bilateral perihilar edema/congestion although cannot exclude pneumonia.  Follow-up recommended      Electronically signed by: Sebastien Pettit MD  Date:    11/26/2018  Time:    17:53             Narrative:    EXAMINATION:  XR CHEST AP PORTABLE    CLINICAL HISTORY:  Shortness of breath    COMPARISON:  May 2017    FINDINGS:  The heart size is normal.  Extensive bilateral infiltrates greatest in the perihilar Artem greater on the left.  This may be due to pulmonary edema, asymmetric however bilateral pneumonia cannot be excluded.  Follow-up recommended.                              Assessment/Plan:      * NSTEMI (non-ST elevated myocardial infarction)    - Initial troponin 4.417, EKG with inferior ST depression (no previous to compare).  Chest pain free.  - Trend serial cardiac enzymes   - EKG showed NSR with ST wave abnormality  - 2D echo results reviewed   - FLP.  - Continue ASA and high-intensity statin.  Will add BB.  - Continue IV heparin drip per ACS protocol.  - Cardiology following.  - [facility] planned for am    -11/28- will continue diuresis , she is at risk of contrast induced nephropathy , cardiology follow up appreciated.     GREGG on CKD, stage IV    - Likely cardiorenal syndrome.  - Diuresis continued  - strict I&O's.  - Avoid nephrotoxic agents.  Hold home ACEi.  - Nephrology following   - Cardiac Cath planned for tomorrow morning  - baseline creatinine 2.5-3    11/28- nephrology input appreciated , closely monitor serum creatinine       Hyperlipidemia    - Continue daily Lipitor 80mg.  - Check FLP.     Essential hypertension    - BP stable.  - Resume home hydralazine and amlodipine.    -Continue BB and diuresis as above.  - ACEi on hold      Acute hypoxemic respiratory failure secondary to acute decompensated HF    - O2 sat stable on 60% FiO2 per BiPAP, wean as tolerated.  -BIPAP weaned to high flow oxygen   - IV lasix 120mg given in ED with ~1L diuresed.    - Continue aggressive diuresis with IV Lasix 80mg BID.  - Strict I&O's, daily weights,   - Na/fluid restriction.  -  2D echo results showed EF 45% with diastolic dysfunction, PAP 69, +WMA, and mild to moderate MR.   - Cardiology and Pulmonology following    11/28- continue diuresis ,wean off vapotherm as tolerated     Anemia in stage 4 chronic kidney disease    - H&H stable at baseline.    -1 unit of PRBCs transfused   -repeat CBC in am   -transfuse as needed.    11/28- will closely monitor hemoglobin, transfuse as needed     Controlled type 2 diabetes mellitus with diabetic polyneuropathy, with long-term current use of insulin    - Accuchecks with moderate SSI.  - HbA1c 6.7  -Diabetic diet- NPO after MN for pending procedure    11/28- continue insulin sliding scale, diabetic diet       VTE Risk Mitigation (From admission, onward)        Ordered     IP VTE HIGH RISK PATIENT  Once      11/27/18 1342     Place sequential compression device  Until discontinued      11/27/18 1342     heparin 25,000 units in dextrose 5% 250 mL (100 units/mL) infusion LOW INTENSITY nomogram - [facility]  Continuous      11/26/18 1905     heparin 25,000 units in dextrose 5% (100 units/ml) IV bolus from bag - ADDITIONAL PRN BOLUS - 60 units/kg (max bolus 4000 units)  As needed (PRN)      11/26/18 1905     heparin 25,000 units in dextrose 5% (100 units/ml) IV bolus from bag - ADDITIONAL PRN BOLUS - 30 units/kg (max bolus 4000 units)  As needed (PRN)      11/26/18 1905              Zi Landon MD  Department of [facility]  Medicine   [facility]

## 2018-11-29 NOTE — PROGRESS NOTES
[facility]  Pulmonology  Progress Note    Patient Name: Avril Monzon  MRN: [id number]  Admission Date: 11/26/2018  Hospital Length of Stay: 3 days  Code Status: Full Code  Attending Provider: Zi Landon MD  Primary Care Provider: Rose Parks MD   Principal Problem: NSTEMI (non-ST elevated myocardial infarction)    Subjective:     Interval History:   Review of Systems   Constitutional: Negative for chills and fever.   HENT: Negative for hearing loss and nosebleeds.    Eyes: Negative for discharge.   Respiratory: Positive for shortness of breath. Negative for hemoptysis.    Cardiovascular: Negative for chest pain.   Gastrointestinal: Negative for blood in stool.   Genitourinary: Negative for hematuria.   Musculoskeletal: Negative for falls.   Skin: Negative for itching.   Neurological: Negative for seizures and loss of consciousness.   Endo/Heme/Allergies: Negative for polydipsia.   Psychiatric/Behavioral: Negative for memory loss.       Objective:     Vital Signs (Most Recent):  Temp: 99.3 °F (37.4 °C) (11/29/18 1132)  Pulse: 74 (11/29/18 1132)  Resp: 18 (11/29/18 1132)  BP: (!) 115/56 (11/29/18 1132)  SpO2: (!) 92 % (11/29/18 1132) Vital Signs (24h Range):  Temp:  [98.7 °F (37.1 °C)-100.3 °F (37.9 °C)] 99.3 °F (37.4 °C)  Pulse:  [70-80] 74  Resp:  [15-20] 18  SpO2:  [92 %-97 %] 92 %  BP: (103-132)/(47-92) 115/56     Weight: 68.2 kg (150 lb 5.7 oz)  Body mass index is 25.02 kg/m².      Intake/Output Summary (Last 24 hours) at 11/29/2018 1319  Last data filed at 11/29/2018 0500  Gross per 24 hour   Intake 147.2 ml   Output 800 ml   Net -652.8 ml       Physical Exam   Constitutional: She is oriented to person, place, and time. She appears well-developed and well-nourished. No distress. Nasal cannula in place.   HENT:   Head: Normocephalic and atraumatic.   Eyes: EOM are normal.   Neck: Neck supple.   Cardiovascular: Normal rate and regular rhythm.   Pulmonary/Chest: Effort normal. No  respiratory distress. She has rales. She exhibits no tenderness.   Abdominal: Soft. There is no tenderness.   Musculoskeletal: She exhibits edema.   Trace edema bilateral lower extremity   Neurological: She is alert and oriented to person, place, and time.   Skin: Skin is warm.   Psychiatric: She has a normal mood and affect. Her behavior is normal. Judgment and thought content normal.   Nursing note and vitals reviewed.      Vents:  Oxygen Concentration (%): 40 (11/29/18 0911)    Lines/Drains/Airways     Peripheral Intravenous Line                 Peripheral IV - Single Lumen 11/26/18 1806 Right Hand 2 days         Peripheral IV - Single Lumen 11/26/18 1916 Right Antecubital 2 days                Significant Labs:    CBC/Anemia Profile:  Recent Labs   Lab 11/28/18  0611 11/29/18  0534   WBC 8.46  8.46 7.32   HGB 9.0*  9.0* 9.0*   HCT 27.3*  27.3* 27.7*     182 199   MCV 92  92 92   RDW 14.9*  14.9* 14.5        Chemistries:  Recent Labs   Lab 11/28/18  0611 11/29/18  0534     139 141   K 3.5  3.5 4.0     106 107   CO2 20*  20* 23   BUN 69*  69* 75*   CREATININE 4.2*  4.2* 4.3*   CALCIUM 8.3*  8.3* 9.0     Echo :      1 - Wall motion abnormalities.     2 - Mildly depressed left ventricular systolic function (EF 45-50%).     3 - Impaired LV relaxation, normal LAP (grade 1 diastolic dysfunction).     4 - Normal right ventricular systolic function .     5 - Pulmonary hypertension. The estimated PA systolic pressure is 69 mmHg.     6 - Mild to moderate mitral regurgitation.     7 - Mild tricuspid regurgitation.     8 - Intermediate central venous pressure.     9 - Right pleural effusion.     Significant Imaging:  CXR: I have reviewed all pertinent results/findings within the past 24 hours and my personal findings are:  improved pulm edema      Assessment/Plan:     * NSTEMI (non-ST elevated myocardial infarction)    Cardiac monitoring  IV heparin, aspirin , statin , beta-blocker, cardiology  follow-up  11/28 same.  Monitor PTT  11/29 IV heparin, Monitor PTT. aspirin , statin , beta-blocker, LHC      Pulmonary hypertension    11/28 elevated systolic PA pressure likely related to left heart disease, continue diuresis.  Will evaluate for home  O2 on discharge  11/29 IV lasix 80 mg q 12 hrs      GREGG on CKD, stage IV    P.o. bicarb.  Strict I&Os.  Monitor creatinine bicarb level.  11/28 continue p.o. bicarb.  Strict I&Os.  Monitor BMP  11/29 stable creatinine , aware of risks and possible HD need      Acute hypoxemic respiratory failure secondary to acute decompensated HF    Strict I&Os, IV Lasix 80 mg q.12 hours.  Repeat chest x-ray in a.m.  Vapotherm keep O2 sat above 89%, BiPAP p.r.n. Distress  11/28 wean O2 , BiPAP p.r.n., continue IV Lasix 80 mg q.12 hours.  Strict I&Os.  11/28 repeat CXR , BNP  , remaining on 5 liters O2             Hattie Stephens MD  Pulmonology  [facility]

## 2018-11-29 NOTE — PROGRESS NOTES
[facility]  Nephrology  Progress Note    Patient Name: Avril Monzon  MRN: [id number]  Admission Date: 11/26/2018  Hospital Length of Stay: 3 days  Attending Provider: Zi Landon MD   Primary Care Physician: Rose Parks MD  Principal Problem:NSTEMI (non-ST elevated myocardial infarction)    Subjective:     HPI: Avril Monzon is a pleasant 73-year-old  woman with history of hypertension, type 2 diabetes, CKD stage 4, followed in our clinic by Dr. Doe, baseline serum creatinine about 2.5-3, patient also has coronary artery disease, she presents to the emergency room with some chest discomfort and shortness of breath, patient diagnosed with NSTEMI, also acute on chronic kidney disease, serum creatinine at 4 mg/dL, likely due to hemodynamic reasons, we were consulted for acute on chronic renal failure, possible need for left heart catheterization,    Interval History:      no acute events, denies complaints ,     Review of patient's allergies indicates:   Allergen Reactions    Codeine Swelling    Darvon [propoxyphene] Swelling     Current Facility-Administered Medications   Medication Frequency    0.9%  NaCl infusion (for blood administration) Q24H PRN    acetaminophen tablet 650 mg Q6H PRN    amLODIPine tablet 5 mg Daily    aspirin EC tablet 81 mg Daily    atorvastatin tablet 80 mg Daily    calcifediol Cs24 30 mcg Daily    dextrose 50% injection 12.5 g PRN    furosemide injection 80 mg BID    glucagon (human recombinant) injection 1 mg PRN    glucose chewable tablet 16 g PRN    glucose chewable tablet 24 g PRN    heparin 25,000 units in dextrose 5% (100 units/ml) IV bolus from bag - ADDITIONAL PRN BOLUS - 30 units/kg (max bolus 4000 units) PRN    heparin 25,000 units in dextrose 5% (100 units/ml) IV bolus from bag - ADDITIONAL PRN BOLUS - 60 units/kg (max bolus 4000 units) PRN    heparin 25,000 units in dextrose 5% 250 mL (100 units/mL) infusion LOW INTENSITY  nomogram - [facility] Continuous    hydrALAZINE tablet 25 mg Q8H    insulin aspart U-100 pen 1-10 Units QID (AC + HS) PRN    insulin detemir U-100 pen 15 Units QHS    isosorbide mononitrate 24 hr tablet 60 mg Daily    metoprolol tartrate (LOPRESSOR) tablet 25 mg BID    nitroGLYCERIN SL tablet 0.4 mg Q5 Min PRN    ondansetron injection 4 mg Q6H PRN    pantoprazole EC tablet 40 mg Daily    promethazine (PHENERGAN) 6.25 mg in dextrose 5 % 50 mL IVPB Q6H PRN    sodium bicarbonate tablet 325 mg TID       Objective:     Vital Signs (Most Recent):  Temp: 98.7 °F (37.1 °C) (11/29/18 0725)  Pulse: 76 (11/29/18 0911)  Resp: 20 (11/29/18 0911)  BP: (!) 132/92 (11/29/18 0725)  SpO2: 97 % (11/29/18 0911)  O2 Device (Oxygen Therapy): nasal cannula w/ humidification (11/29/18 0911) Vital Signs (24h Range):  Temp:  [98.5 °F (36.9 °C)-100.3 °F (37.9 °C)] 98.7 °F (37.1 °C)  Pulse:  [70-80] 76  Resp:  [15-20] 20  SpO2:  [92 %-99 %] 97 %  BP: (103-132)/(33-92) 132/92     Weight: 68.2 kg (150 lb 5.7 oz) (11/28/18 1507)  Body mass index is 25.02 kg/m².  Body surface area is 1.77 meters squared.    I/O last 3 completed shifts:  In: 1178.1 [P.O.:525; I.V.:310.5; Blood:322.9; IV Piggyback:19.6]  Out: 2560 [Urine:2560]    Physical Exam   Constitutional: She is oriented to person, place, and time. She appears well-developed.   HENT:   Head: Normocephalic and atraumatic.   Mouth/Throat: Oropharynx is clear and moist. No oropharyngeal exudate.   Eyes: Conjunctivae and EOM are normal. Pupils are equal, round, and reactive to light.   Right corneal opacity    Neck: Normal range of motion. Neck supple. No JVD present. Carotid bruit is not present. No tracheal deviation present. No thyroid mass and no thyromegaly present.   Cardiovascular: Normal rate, regular rhythm and intact distal pulses. Exam reveals no gallop and no friction rub.   Murmur heard.  Pulmonary/Chest: No respiratory distress. She has no wheezes. She exhibits no tenderness.    Abdominal: Soft. Bowel sounds are normal. She exhibits no distension, no abdominal bruit, no ascites and no mass. There is no hepatosplenomegaly. There is no tenderness. There is no rebound, no guarding and no CVA tenderness.   Musculoskeletal: She exhibits no edema or tenderness.   Lymphadenopathy:     She has no cervical adenopathy.   Neurological: She is alert and oriented to person, place, and time. No cranial nerve deficit. She exhibits normal muscle tone. Coordination normal.   Skin: Skin is warm and intact. No rash noted. No erythema. No pallor.       Significant Labs:  CBC:   Recent Labs   Lab 11/29/18  0534   WBC 7.32   RBC 3.00*   HGB 9.0*   HCT 27.7*      MCV 92   MCH 30.0   MCHC 32.5     CMP:   Recent Labs   Lab 11/26/18 1807 11/29/18  0534   *   < > 112*   CALCIUM 9.2   < > 9.0   ALBUMIN 3.4*  --   --    PROT 7.2  --   --       < > 141   K 4.1   < > 4.0   CO2 17*   < > 23      < > 107   BUN 69*   < > 75*   CREATININE 4.1*   < > 4.3*   ALKPHOS 84  --   --    ALT 13  --   --    AST 25  --   --    BILITOT 0.8  --   --     < > = values in this interval not displayed.     Coagulation:   Recent Labs   Lab 11/26/18 1807 11/29/18  1101   INR 0.9  --   --    APTT 23.9   < > 38.2*    < > = values in this interval not displayed.     LFTs:   Recent Labs   Lab 11/26/18 1807   ALT 13   AST 25   ALKPHOS 84   BILITOT 0.8   PROT 7.2   ALBUMIN 3.4*     All labs within the past 24 hours have been reviewed.     Significant Imaging: reviewed    Lab Results   Component Value Date    .0 (H) 01/23/2018    CALCIUM 9.0 11/29/2018    PHOS 3.6 07/31/2018         Assessment/Plan:     GREGG on CKD, stage IV    1. GREGG on CKD stage 4 :   baseline creatinine about 2.5-3, serum creatinine on presentation was 4 mg/dL, 4.3 today ,     acute on chronic kidney disease likely due to hemodynamic reasons due to acute MI,    discussed about risks/benefits of left heart catheterization, IV contrast exposure,  possibility of worsening renal function requiring renal replacement therapy, patient understands the risks and agrees to proceed with the plan, if possible can do gentle hydration and hour before and 6 hr after after LHC ,     with acute decompensated heart failure IV fluids have to be used with caution.  Patient interested in peritoneal dialysis when indicated,    2.  Hypertension - BP controlled,     3. NSTEMI - continue medications, LHC per Cardiology,    4. Anemia of CKD :  Follow hemoglobin, consider PRBC transfusion if indicated due to acute MI, can give some IV furosemide prior transfusion,    Dose of epogen today ,     5. Stable lytes     6. ADHF : due to AMI , clinically better , will reduce lasix to daily    7.  cxs negative so far ,    8. Met acidosis - on bicarb supplements            I will follow-up with patient. Please contact us if you have any additional questions.     Total time spent 40 minutes including time needed to review the records,  patient  evaluation, documentation, face-to-face discussion with the patient,  primary and cards teams,   more than 50% of the time was spent on coordination of care and counseling.       Leonard Bryan MD  Nephrology  [facility]

## 2018-11-29 NOTE — SUBJECTIVE & OBJECTIVE
Interval History:      no acute events, denies complaints ,     Review of patient's allergies indicates:   Allergen Reactions    Codeine Swelling    Darvon [propoxyphene] Swelling     Current Facility-Administered Medications   Medication Frequency    0.9%  NaCl infusion (for blood administration) Q24H PRN    acetaminophen tablet 650 mg Q6H PRN    amLODIPine tablet 5 mg Daily    aspirin EC tablet 81 mg Daily    atorvastatin tablet 80 mg Daily    calcifediol Cs24 30 mcg Daily    dextrose 50% injection 12.5 g PRN    furosemide injection 80 mg BID    glucagon (human recombinant) injection 1 mg PRN    glucose chewable tablet 16 g PRN    glucose chewable tablet 24 g PRN    heparin 25,000 units in dextrose 5% (100 units/ml) IV bolus from bag - ADDITIONAL PRN BOLUS - 30 units/kg (max bolus 4000 units) PRN    heparin 25,000 units in dextrose 5% (100 units/ml) IV bolus from bag - ADDITIONAL PRN BOLUS - 60 units/kg (max bolus 4000 units) PRN    heparin 25,000 units in dextrose 5% 250 mL (100 units/mL) infusion LOW INTENSITY nomogram - [facility] Continuous    hydrALAZINE tablet 25 mg Q8H    insulin aspart U-100 pen 1-10 Units QID (AC + HS) PRN    insulin detemir U-100 pen 15 Units QHS    isosorbide mononitrate 24 hr tablet 60 mg Daily    metoprolol tartrate (LOPRESSOR) tablet 25 mg BID    nitroGLYCERIN SL tablet 0.4 mg Q5 Min PRN    ondansetron injection 4 mg Q6H PRN    pantoprazole EC tablet 40 mg Daily    promethazine (PHENERGAN) 6.25 mg in dextrose 5 % 50 mL IVPB Q6H PRN    sodium bicarbonate tablet 325 mg TID       Objective:     Vital Signs (Most Recent):  Temp: 98.7 °F (37.1 °C) (11/29/18 0725)  Pulse: 76 (11/29/18 0911)  Resp: 20 (11/29/18 0911)  BP: (!) 132/92 (11/29/18 0725)  SpO2: 97 % (11/29/18 0911)  O2 Device (Oxygen Therapy): nasal cannula w/ humidification (11/29/18 0911) Vital Signs (24h Range):  Temp:  [98.5 °F (36.9 °C)-100.3 °F (37.9 °C)] 98.7 °F (37.1 °C)  Pulse:  [70-80] 76  Resp:   [15-20] 20  SpO2:  [92 %-99 %] 97 %  BP: (103-132)/(33-92) 132/92     Weight: 68.2 kg (150 lb 5.7 oz) (11/28/18 1507)  Body mass index is 25.02 kg/m².  Body surface area is 1.77 meters squared.    I/O last 3 completed shifts:  In: 1178.1 [P.O.:525; I.V.:310.5; Blood:322.9; IV Piggyback:19.6]  Out: 2560 [Urine:2560]    Physical Exam   Constitutional: She is oriented to person, place, and time. She appears well-developed.   HENT:   Head: Normocephalic and atraumatic.   Mouth/Throat: Oropharynx is clear and moist. No oropharyngeal exudate.   Eyes: Conjunctivae and EOM are normal. Pupils are equal, round, and reactive to light.   Right corneal opacity    Neck: Normal range of motion. Neck supple. No JVD present. Carotid bruit is not present. No tracheal deviation present. No thyroid mass and no thyromegaly present.   Cardiovascular: Normal rate, regular rhythm and intact distal pulses. Exam reveals no gallop and no friction rub.   Murmur heard.  Pulmonary/Chest: No respiratory distress. She has no wheezes. She exhibits no tenderness.   Abdominal: Soft. Bowel sounds are normal. She exhibits no distension, no abdominal bruit, no ascites and no mass. There is no hepatosplenomegaly. There is no tenderness. There is no rebound, no guarding and no CVA tenderness.   Musculoskeletal: She exhibits no edema or tenderness.   Lymphadenopathy:     She has no cervical adenopathy.   Neurological: She is alert and oriented to person, place, and time. No cranial nerve deficit. She exhibits normal muscle tone. Coordination normal.   Skin: Skin is warm and intact. No rash noted. No erythema. No pallor.       Significant Labs:  CBC:   Recent Labs   Lab 11/29/18  0534   WBC 7.32   RBC 3.00*   HGB 9.0*   HCT 27.7*      MCV 92   MCH 30.0   MCHC 32.5     CMP:   Recent Labs   Lab 11/26/18  1807  11/29/18  0534   *   < > 112*   CALCIUM 9.2   < > 9.0   ALBUMIN 3.4*  --   --    PROT 7.2  --   --       < > 141   K 4.1   < > 4.0    CO2 17*   < > 23      < > 107   BUN 69*   < > 75*   CREATININE 4.1*   < > 4.3*   ALKPHOS 84  --   --    ALT 13  --   --    AST 25  --   --    BILITOT 0.8  --   --     < > = values in this interval not displayed.     Coagulation:   Recent Labs   Lab 11/26/18 1807 11/29/18  1101   INR 0.9  --   --    APTT 23.9   < > 38.2*    < > = values in this interval not displayed.     LFTs:   Recent Labs   Lab 11/26/18 1807   ALT 13   AST 25   ALKPHOS 84   BILITOT 0.8   PROT 7.2   ALBUMIN 3.4*     All labs within the past 24 hours have been reviewed.     Significant Imaging: reviewed    Lab Results   Component Value Date    .0 (H) 01/23/2018    CALCIUM 9.0 11/29/2018    PHOS 3.6 07/31/2018

## 2018-11-29 NOTE — ASSESSMENT & PLAN NOTE
- Initial troponin 4.417, EKG with inferior ST depression (no previous to compare).  Chest pain free.  - Trend serial cardiac enzymes   - EKG showed NSR with ST wave abnormality  - 2D echo results reviewed   - FLP.  - Continue ASA and high-intensity statin.  Will add BB.  - Continue IV heparin drip per ACS protocol.  - Cardiology following.  - [facility] planned for am    -11/28- will continue diuresis , she is at risk of contrast induced nephropathy , cardiology follow up appreciated.

## 2018-11-29 NOTE — ASSESSMENT & PLAN NOTE
P.o. bicarb.  Strict I&Os.  Monitor creatinine bicarb level.  11/28 continue p.o. bicarb.  Strict I&Os.  Monitor BMP  11/29 stable creatinine , aware of risks and possible HD need

## 2018-11-29 NOTE — ASSESSMENT & PLAN NOTE
- H&H stable at baseline.    -1 unit of PRBCs transfused   -repeat CBC in am   -transfuse as needed.    11/28- will closely monitor hemoglobin, transfuse as needed

## 2018-11-29 NOTE — ASSESSMENT & PLAN NOTE
Cardiac monitoring  IV heparin, aspirin , statin , beta-blocker, cardiology follow-up  11/28 same.  Monitor PTT  11/29 IV heparin, Monitor PTT. aspirin , statin , beta-blocker, [facility]

## 2018-11-29 NOTE — SUBJECTIVE & OBJECTIVE
Interval History: Pt seen and examined. CVT consulted for CABG vs LAD atherectomy       Review of Systems   Constitutional: Negative for activity change, appetite change, fatigue and fever.   HENT: Negative for congestion, facial swelling, sore throat, trouble swallowing and voice change.    Eyes: Negative for redness and visual disturbance.   Respiratory: Negative for apnea, cough, chest tightness, shortness of breath and wheezing.    Cardiovascular: Negative for chest pain (improved), palpitations and leg swelling.   Gastrointestinal: Negative for abdominal distention, abdominal pain, blood in stool, constipation, diarrhea, nausea and vomiting.   Genitourinary: Negative for decreased urine volume, difficulty urinating, dysuria, flank pain, frequency, hematuria, pelvic pain and urgency.   Musculoskeletal: Negative for back pain, gait problem and joint swelling.   Skin: Negative for color change and rash.   Neurological: Negative for dizziness, syncope, weakness and headaches.   Hematological: Does not bruise/bleed easily.   Psychiatric/Behavioral: Negative for agitation, behavioral problems and confusion. The patient is not nervous/anxious.      Objective:     Vital Signs (Most Recent):  Temp: 99.3 °F (37.4 °C) (11/29/18 1132)  Pulse: 74 (11/29/18 1132)  Resp: 18 (11/29/18 1132)  BP: (!) 115/56 (11/29/18 1132)  SpO2: (!) 92 % (11/29/18 1132) Vital Signs (24h Range):  Temp:  [98.7 °F (37.1 °C)-100.3 °F (37.9 °C)] 99.3 °F (37.4 °C)  Pulse:  [70-80] 74  Resp:  [18-20] 18  SpO2:  [92 %-97 %] 92 %  BP: (103-132)/(51-92) 115/56     Weight: 68.2 kg (150 lb 5.7 oz)  Body mass index is 25.02 kg/m².    Intake/Output Summary (Last 24 hours) at 11/29/2018 1455  Last data filed at 11/29/2018 0500  Gross per 24 hour   Intake 138 ml   Output 800 ml   Net -662 ml      Physical Exam   Constitutional: She is oriented to person, place, and time. She appears well-developed and well-nourished. She is cooperative. She is easily aroused.  No distress. Nasal cannula in place.   HENT:   Head: Normocephalic and atraumatic.   Eyes: EOM are normal.   Neck: Normal range of motion. Neck supple.   Cardiovascular: Normal rate, regular rhythm and intact distal pulses.   Pulses:       Radial pulses are 2+ on the right side, and 2+ on the left side.        Dorsalis pedis pulses are 2+ on the right side, and 2+ on the left side.   Pulmonary/Chest: Effort normal and breath sounds normal. No accessory muscle usage or stridor. No tachypnea. No respiratory distress. She has no wheezes. She has no rales. She exhibits no tenderness.   Abdominal: Soft. Bowel sounds are normal. She exhibits no distension. There is no tenderness. There is no rebound and no guarding.   Genitourinary:   Genitourinary Comments: Deferred   Musculoskeletal: She exhibits edema.   Trace edema bilateral lower extremity   Neurological: She is alert, oriented to person, place, and time and easily aroused. No sensory deficit.   Skin: Skin is warm. Capillary refill takes less than 2 seconds.   Psychiatric: She has a normal mood and affect. Her behavior is normal. Judgment and thought content normal.   Nursing note and vitals reviewed.      Significant Labs: All pertinent labs within the past 24 hours have been reviewed.    Significant Imaging: I have reviewed and interpreted all pertinent imaging results/findings within the past 24 hours.

## 2018-11-29 NOTE — ASSESSMENT & PLAN NOTE
1. GREGG on CKD stage 4 :   baseline creatinine about 2.5-3, serum creatinine on presentation was 4 mg/dL, 4.3 today ,     acute on chronic kidney disease likely due to hemodynamic reasons due to acute MI,    discussed about risks/benefits of left heart catheterization, IV contrast exposure, possibility of worsening renal function requiring renal replacement therapy, patient understands the risks and agrees to proceed with the plan, if possible can do gentle hydration and hour before and 6 hr after after LHC ,     with acute decompensated heart failure IV fluids have to be used with caution.  Patient interested in peritoneal dialysis when indicated,    2.  Hypertension - BP controlled,     3. NSTEMI - continue medications, LHC per Cardiology,    4. Anemia of CKD :  Follow hemoglobin, consider PRBC transfusion if indicated due to acute MI, can give some IV furosemide prior transfusion,    Dose of epogen today ,     5. Stable lytes     6. ADHF : due to AMI , clinically better , will reduce lasix to daily    7.  cxs negative so far ,    8. Met acidosis - on bicarb supplements

## 2018-11-29 NOTE — ASSESSMENT & PLAN NOTE
- Accuchecks with moderate SSI.  - HbA1c 6.7  -Diabetic diet- NPO after MN for pending procedure    11/28- continue insulin sliding scale, diabetic diet

## 2018-11-29 NOTE — PLAN OF CARE
Problem: Patient Care Overview  Goal: Plan of Care Review  Patient vitals remain stable, safety measure are intact. Education and plan of care provided and explained. All alarms are on and audible, will continue to monitor.

## 2018-11-29 NOTE — ASSESSMENT & PLAN NOTE
- Likely cardiorenal syndrome.  - Diuresis continued  - strict I&O's.  - Avoid nephrotoxic agents.  Hold home ACEi.  - Nephrology following   - Cardiac Cath planned for tomorrow morning  - baseline creatinine 2.5-3    11/28- nephrology input appreciated , closely monitor serum creatinine

## 2018-11-29 NOTE — ASSESSMENT & PLAN NOTE
11/28 elevated systolic PA pressure likely related to left heart disease, continue diuresis.  Will evaluate for home  O2 on discharge  11/29 IV lasix 80 mg q 12 hrs

## 2018-11-29 NOTE — ASSESSMENT & PLAN NOTE
-Patient who presented in fulminant pulmonary edema/decompensated CHF in setting of MI  -Troponin 4.417>15.866>16.308  -Needs IV diuresis/optimization of medical therapy  -Continue ASA, amlodipine, statin, hydralazine, lopressor  -Add oral nitrates  -Continue heparin gtt for anticoagulation  -Will need R/LHC once optimized. Patient is at high risk of contrast-induced nephropathy/transition to dialysis. All risks, benefits, and treatment alternatives explained to patient in detail.  -Nephrology on board, appreciate assistance     11/28/18  -Stable overnight  -Troponin trending down  -Volume status improved  -No chest pain  -Continue ASA, amlodipine, statin, hydralazine, BB, imdur  -+Temp noted; Will plan on L/RHC for further evaluation once afebrile and compensated  -NPO after MN    11/29/18  -Improving  -Would benefit from additional day of IV diuresis  -Chest pain free  -Afebrile overnight  -Continue ASA, amlodipine, statin, hydralazine, BB, Imdur  -D/C heparin gtt, start Plavix 75 mg daily  -NPO after MN  -LHC tmw by Dr. Holman. All risks, benefits, and treatment alternatives explained in detail. All questions answered. Patient is aware she is at high risk of contrast induced nephropathy and will need dialysis.

## 2018-11-29 NOTE — ASSESSMENT & PLAN NOTE
Strict I&Os, IV Lasix 80 mg q.12 hours.  Repeat chest x-ray in a.m.  Vapotherm keep O2 sat above 89%, BiPAP p.r.n. Distress  11/28 wean O2 , BiPAP p.r.n., continue IV Lasix 80 mg q.12 hours.  Strict I&Os.  11/28 repeat CXR , BNP  , remaining on 5 liters O2

## 2018-11-29 NOTE — ASSESSMENT & PLAN NOTE
- O2 sat stable on 60% FiO2 per BiPAP, wean as tolerated.  -BIPAP weaned to high flow oxygen   - IV lasix 120mg given in ED with ~1L diuresed.    - Continue aggressive diuresis with IV Lasix 80mg BID.  - Strict I&O's, daily weights,   - Na/fluid restriction.  -  2D echo results showed EF 45% with diastolic dysfunction, PAP 69, +WMA, and mild to moderate MR.   - Cardiology and Pulmonology following    11/28- continue diuresis ,wean off vapotherm as tolerated

## 2018-11-29 NOTE — PROGRESS NOTES
"[facility]  Cardiology  Progress Note    Patient Name: Avril Monzon  MRN: [id number]  Admission Date: 11/26/2018  Hospital Length of Stay: 3 days  Code Status: Full Code   Attending Physician: Zi Landon MD   Primary Care Physician: Rose Parks MD  Expected Discharge Date:   Principal Problem:NSTEMI (non-ST elevated myocardial infarction)    Subjective:   HPI:  Ms. Monzon is a 73 year old female patient with a PMHx of HTN, hyperlipidemia, DM type II, former tobacco abuse, CKD, anemia, and CVA who presented to [facility] ED last night with a chief complaint of progressively worsening SOB over the past 2-3 days. Patient stated she began to feel like she was coming down with a "cold or sinus infection" on Friday and developed a persistent cough when lying down. Associated symptoms included fatigue and subjective fever. Patient denied any associated ministerio chest pain, palpitations, weight gain, nausea, vomiting, diaphoresis, near syncope, or syncope. Initial workup in ED revealed creatinine of 4.1, BNP of 1613, troponin of 4.4, and lactic acid of 2.7. CXR showed evidence of fluid overload. EKG showed inferior ST depression and patient subsequently admitted for further evaluation and treatment. Cardiology consulted to assist with management. Patient seen and examined today while in ED. States SOB is improving. Remains chest pain free. Denies any prior cardiac history-no known CAD or history of MI. Patient reports compliance with her medications. Chart reviewed. Troponin 4417>15.866>16.308. Repeat EKG this AM showed diffuse ST depression in anterior leads. 2D echo showed EF of 45-50%, +WMA, pulmonary HTN, and mild to moderate MR.    Hospital Course:   11/28/18-Patient seen and examined today, sitting up in bed. Feels better. SOB improving. Remains chest pain free. Diuresed well. Temp of 101 noted overnight. Labs reviewed. Creatinine 4.2.     11/29/18-Patient seen and examined today. Still mildly SOB. " No chest pain. Afebrile overnight. Creatinine 4.3.        Review of Systems   Constitution: Positive for weakness and malaise/fatigue.   HENT: Negative.    Eyes: Negative.    Cardiovascular: Positive for dyspnea on exertion.   Respiratory: Positive for shortness of breath.    Endocrine: Negative.    Hematologic/Lymphatic: Negative.    Skin: Negative.    Musculoskeletal: Negative.    Gastrointestinal: Negative.    Genitourinary: Negative.    Psychiatric/Behavioral: Negative.    Allergic/Immunologic: Negative.      Objective:     Vital Signs (Most Recent):  Temp: 99.3 °F (37.4 °C) (11/29/18 1132)  Pulse: 74 (11/29/18 1132)  Resp: 18 (11/29/18 1132)  BP: (!) 115/56 (11/29/18 1132)  SpO2: (!) 92 % (11/29/18 1132) Vital Signs (24h Range):  Temp:  [98.7 °F (37.1 °C)-100.3 °F (37.9 °C)] 99.3 °F (37.4 °C)  Pulse:  [70-80] 74  Resp:  [18-20] 18  SpO2:  [92 %-97 %] 92 %  BP: (103-132)/(51-92) 115/56     Weight: 68.2 kg (150 lb 5.7 oz)  Body mass index is 25.02 kg/m².     SpO2: (!) 92 %  O2 Device (Oxygen Therapy): nasal cannula w/ humidification      Intake/Output Summary (Last 24 hours) at 11/29/2018 1455  Last data filed at 11/29/2018 0500  Gross per 24 hour   Intake 138 ml   Output 800 ml   Net -662 ml       Lines/Drains/Airways     Peripheral Intravenous Line                 Peripheral IV - Single Lumen 11/26/18 1806 Right Hand 2 days         Peripheral IV - Single Lumen 11/26/18 1916 Right Antecubital 2 days                Physical Exam   Constitutional: She is oriented to person, place, and time. She appears well-developed and well-nourished. No distress.   HENT:   Head: Normocephalic and atraumatic.   Eyes: Pupils are equal, round, and reactive to light. Right eye exhibits no discharge. Left eye exhibits no discharge.   Neck: Neck supple. JVD present. No thyromegaly present.   Cardiovascular: Normal rate, regular rhythm, S1 normal, S2 normal and normal heart sounds.   No murmur heard.  Pulmonary/Chest: Effort normal.  No respiratory distress. She has no wheezes. She has rales (faint at bases).   Abdominal: Soft. She exhibits no distension. There is no tenderness. There is no rebound.   Musculoskeletal: She exhibits no edema.   Neurological: She is alert and oriented to person, place, and time.   Skin: Skin is warm and dry. She is not diaphoretic. No erythema.   Psychiatric: She has a normal mood and affect. Her behavior is normal. Thought content normal.   Nursing note and vitals reviewed.      Significant Labs:   CMP   Recent Labs   Lab 11/28/18  0611 11/29/18  0534     139 141   K 3.5  3.5 4.0     106 107   CO2 20*  20* 23   *  190* 112*   BUN 69*  69* 75*   CREATININE 4.2*  4.2* 4.3*   CALCIUM 8.3*  8.3* 9.0   ANIONGAP 13  13 11   ESTGFRAFRICA 12*  12* 11*   EGFRNONAA 10*  10* 10*   , CBC   Recent Labs   Lab 11/28/18  0611 11/29/18  0534   WBC 8.46  8.46 7.32   HGB 9.0*  9.0* 9.0*   HCT 27.3*  27.3* 27.7*     182 199   , Troponin No results for input(s): TROPONINI in the last 48 hours. and All pertinent lab results from the last 24 hours have been reviewed.    Significant Imaging: Echocardiogram:   2D echo with color flow doppler:   Results for orders placed or performed during the hospital encounter of 11/26/18   2D echo with color flow doppler   Result Value Ref Range    QEF 45 55 - 65    Mitral Valve Regurgitation MILD TO MODERATE     Diastolic Dysfunction Yes (A)     Est. PA Systolic Pressure 69.15 (A)     Tricuspid Valve Regurgitation MILD     and X-Ray: CXR: X-Ray Chest 1 View (CXR): No results found for this visit on 11/26/18. and X-Ray Chest PA and Lateral (CXR): No results found for this visit on 11/26/18.    Assessment and Plan:   Patient who presents with acute CHF in setting of NSTEMI. Clinically improving. Needs additional day of IV diuresis. Continue same meds. Stop heparin. Add Plavix. [facility] tmw.     * NSTEMI (non-ST elevated myocardial infarction)    -Patient who presented  in fulminant pulmonary edema/decompensated CHF in setting of MI  -Troponin 4.417>15.866>16.308  -Needs IV diuresis/optimization of medical therapy  -Continue ASA, amlodipine, statin, hydralazine, lopressor  -Add oral nitrates  -Continue heparin gtt for anticoagulation  -Will need R/LHC once optimized. Patient is at high risk of contrast-induced nephropathy/transition to dialysis. All risks, benefits, and treatment alternatives explained to patient in detail.  -Nephrology on board, appreciate assistance     11/28/18  -Stable overnight  -Troponin trending down  -Volume status improved  -No chest pain  -Continue ASA, amlodipine, statin, hydralazine, BB, imdur  -+Temp noted; Will plan on L/RHC for further evaluation once afebrile and compensated  -NPO after MN    11/29/18  -Improving  -Would benefit from additional day of IV diuresis  -Chest pain free  -Afebrile overnight  -Continue ASA, amlodipine, statin, hydralazine, BB, Imdur  -D/C heparin gtt, start Plavix 75 mg daily  -NPO after MN  -LHC tmw by Dr. Holman. All risks, benefits, and treatment alternatives explained in detail. All questions answered. Patient is aware she is at high risk of contrast induced nephropathy and will need dialysis.      Hyperlipidemia    -Continue statin       Essential hypertension    -Continue amlodipine, Lopressor, hydralazine  -Add Isosorbide mononitrate  -Monitor, BP borderline low today     Acute hypoxemic respiratory failure secondary to acute decompensated HF    -Patient who presents with acute decompensated CHF secondary to NSTEMI/MI  -Needs IV diuresis  -Strict I's/O's  -Assess response  -Continue other meds  -No ACEI/ARB given underlying renal function  -Nephrology on board    11/28/18  -Clinically improved  -Lasix decreased to once daily given borderline BP  -Continue other meds  -No ACEI/ARB given renal function  -Appreciate nephrology assistance    11/29/18  -Improving  -Continue additional day of IV diuresis  -No ACEI/ARB  given renal function  -Nephrology on board         VTE Risk Mitigation (From admission, onward)        Ordered     IP VTE HIGH RISK PATIENT  Once      11/27/18 1342     Place sequential compression device  Until discontinued      11/27/18 1342     heparin 25,000 units in dextrose 5% 250 mL (100 units/mL) infusion LOW INTENSITY nomogram - [facility]  Continuous      11/26/18 1905     heparin 25,000 units in dextrose 5% (100 units/ml) IV bolus from bag - ADDITIONAL PRN BOLUS - 60 units/kg (max bolus 4000 units)  As needed (PRN)      11/26/18 1905     heparin 25,000 units in dextrose 5% (100 units/ml) IV bolus from bag - ADDITIONAL PRN BOLUS - 30 units/kg (max bolus 4000 units)  As needed (PRN)      11/26/18 1905          Moriah Tran PA-C  Cardiology  [facility]     Chart reviewed. Dr. Holman examined patient and agrees with plan as outlined above.

## 2018-11-29 NOTE — SUBJECTIVE & OBJECTIVE
Review of Systems   Constitution: Positive for weakness and malaise/fatigue.   HENT: Negative.    Eyes: Negative.    Cardiovascular: Positive for dyspnea on exertion.   Respiratory: Positive for shortness of breath.    Endocrine: Negative.    Hematologic/Lymphatic: Negative.    Skin: Negative.    Musculoskeletal: Negative.    Gastrointestinal: Negative.    Genitourinary: Negative.    Psychiatric/Behavioral: Negative.    Allergic/Immunologic: Negative.      Objective:     Vital Signs (Most Recent):  Temp: 99.3 °F (37.4 °C) (11/29/18 1132)  Pulse: 74 (11/29/18 1132)  Resp: 18 (11/29/18 1132)  BP: (!) 115/56 (11/29/18 1132)  SpO2: (!) 92 % (11/29/18 1132) Vital Signs (24h Range):  Temp:  [98.7 °F (37.1 °C)-100.3 °F (37.9 °C)] 99.3 °F (37.4 °C)  Pulse:  [70-80] 74  Resp:  [18-20] 18  SpO2:  [92 %-97 %] 92 %  BP: (103-132)/(51-92) 115/56     Weight: 68.2 kg (150 lb 5.7 oz)  Body mass index is 25.02 kg/m².     SpO2: (!) 92 %  O2 Device (Oxygen Therapy): nasal cannula w/ humidification      Intake/Output Summary (Last 24 hours) at 11/29/2018 1455  Last data filed at 11/29/2018 0500  Gross per 24 hour   Intake 138 ml   Output 800 ml   Net -662 ml       Lines/Drains/Airways     Peripheral Intravenous Line                 Peripheral IV - Single Lumen 11/26/18 1806 Right Hand 2 days         Peripheral IV - Single Lumen 11/26/18 1916 Right Antecubital 2 days                Physical Exam   Constitutional: She is oriented to person, place, and time. She appears well-developed and well-nourished. No distress.   HENT:   Head: Normocephalic and atraumatic.   Eyes: Pupils are equal, round, and reactive to light. Right eye exhibits no discharge. Left eye exhibits no discharge.   Neck: Neck supple. JVD present. No thyromegaly present.   Cardiovascular: Normal rate, regular rhythm, S1 normal, S2 normal and normal heart sounds.   No murmur heard.  Pulmonary/Chest: Effort normal. No respiratory distress. She has no wheezes. She has  rales (faint at bases).   Abdominal: Soft. She exhibits no distension. There is no tenderness. There is no rebound.   Musculoskeletal: She exhibits no edema.   Neurological: She is alert and oriented to person, place, and time.   Skin: Skin is warm and dry. She is not diaphoretic. No erythema.   Psychiatric: She has a normal mood and affect. Her behavior is normal. Thought content normal.   Nursing note and vitals reviewed.      Significant Labs:   CMP   Recent Labs   Lab 11/28/18  0611 11/29/18  0534     139 141   K 3.5  3.5 4.0     106 107   CO2 20*  20* 23   *  190* 112*   BUN 69*  69* 75*   CREATININE 4.2*  4.2* 4.3*   CALCIUM 8.3*  8.3* 9.0   ANIONGAP 13  13 11   ESTGFRAFRICA 12*  12* 11*   EGFRNONAA 10*  10* 10*   , CBC   Recent Labs   Lab 11/28/18  0611 11/29/18  0534   WBC 8.46  8.46 7.32   HGB 9.0*  9.0* 9.0*   HCT 27.3*  27.3* 27.7*     182 199   , Troponin No results for input(s): TROPONINI in the last 48 hours. and All pertinent lab results from the last 24 hours have been reviewed.    Significant Imaging: Echocardiogram:   2D echo with color flow doppler:   Results for orders placed or performed during the hospital encounter of 11/26/18   2D echo with color flow doppler   Result Value Ref Range    QEF 45 55 - 65    Mitral Valve Regurgitation MILD TO MODERATE     Diastolic Dysfunction Yes (A)     Est. PA Systolic Pressure 69.15 (A)     Tricuspid Valve Regurgitation MILD     and X-Ray: CXR: X-Ray Chest 1 View (CXR): No results found for this visit on 11/26/18. and X-Ray Chest PA and Lateral (CXR): No results found for this visit on 11/26/18.

## 2018-11-30 PROBLEM — I25.10 CORONARY ARTERY DISEASE INVOLVING NATIVE CORONARY ARTERY OF NATIVE HEART: Status: ACTIVE | Noted: 2018-11-30

## 2018-11-30 PROBLEM — N18.5 CHRONIC KIDNEY DISEASE, STAGE V: Status: ACTIVE | Noted: 2018-11-30

## 2018-11-30 LAB
ALBUMIN SERPL BCP-MCNC: 2.3 G/DL
ANION GAP SERPL CALC-SCNC: 13 MMOL/L
BASOPHILS # BLD AUTO: 0.01 K/UL
BASOPHILS NFR BLD: 0.2 %
BUN SERPL-MCNC: 70 MG/DL
CALCIUM SERPL-MCNC: 9 MG/DL
CHLORIDE SERPL-SCNC: 104 MMOL/L
CO2 SERPL-SCNC: 24 MMOL/L
CORONARY STENOSIS: ABNORMAL
CREAT SERPL-MCNC: 4.4 MG/DL
DIFFERENTIAL METHOD: ABNORMAL
EOSINOPHIL # BLD AUTO: 0.1 K/UL
EOSINOPHIL NFR BLD: 2.1 %
ERYTHROCYTE [DISTWIDTH] IN BLOOD BY AUTOMATED COUNT: 14.3 %
EST. GFR  (AFRICAN AMERICAN): 11 ML/MIN/1.73 M^2
EST. GFR  (NON AFRICAN AMERICAN): 9 ML/MIN/1.73 M^2
GLUCOSE SERPL-MCNC: 168 MG/DL
HCT VFR BLD AUTO: 27.8 %
HGB BLD-MCNC: 9.2 G/DL
LYMPHOCYTES # BLD AUTO: 0.8 K/UL
LYMPHOCYTES NFR BLD: 15.9 %
MCH RBC QN AUTO: 29.7 PG
MCHC RBC AUTO-ENTMCNC: 33.1 G/DL
MCV RBC AUTO: 90 FL
MONOCYTES # BLD AUTO: 0.4 K/UL
MONOCYTES NFR BLD: 7.5 %
NEUTROPHILS # BLD AUTO: 3.9 K/UL
NEUTROPHILS NFR BLD: 74.3 %
PHOSPHATE SERPL-MCNC: 3.9 MG/DL
PLATELET # BLD AUTO: 207 K/UL
PMV BLD AUTO: 9.6 FL
POCT GLUCOSE: 129 MG/DL (ref 70–110)
POCT GLUCOSE: 156 MG/DL (ref 70–110)
POCT GLUCOSE: 95 MG/DL (ref 70–110)
POTASSIUM SERPL-SCNC: 3.8 MMOL/L
RBC # BLD AUTO: 3.1 M/UL
SODIUM SERPL-SCNC: 141 MMOL/L
WBC # BLD AUTO: 5.23 K/UL

## 2018-11-30 PROCEDURE — 85025 COMPLETE CBC W/AUTO DIFF WBC: CPT

## 2018-11-30 PROCEDURE — 27000221 HC OXYGEN, UP TO 24 HOURS

## 2018-11-30 PROCEDURE — 25000003 PHARM REV CODE 250: Performed by: INTERNAL MEDICINE

## 2018-11-30 PROCEDURE — 25000003 PHARM REV CODE 250: Performed by: EMERGENCY MEDICINE

## 2018-11-30 PROCEDURE — 25000003 PHARM REV CODE 250: Performed by: PHYSICIAN ASSISTANT

## 2018-11-30 PROCEDURE — 99233 SBSQ HOSP IP/OBS HIGH 50: CPT | Mod: ,,, | Performed by: INTERNAL MEDICINE

## 2018-11-30 PROCEDURE — 25000003 PHARM REV CODE 250: Performed by: NURSE PRACTITIONER

## 2018-11-30 PROCEDURE — 80069 RENAL FUNCTION PANEL: CPT

## 2018-11-30 PROCEDURE — 25000003 PHARM REV CODE 250

## 2018-11-30 PROCEDURE — 63600175 PHARM REV CODE 636 W HCPCS: Performed by: NURSE PRACTITIONER

## 2018-11-30 PROCEDURE — 99152 MOD SED SAME PHYS/QHP 5/>YRS: CPT | Mod: ,,, | Performed by: INTERNAL MEDICINE

## 2018-11-30 PROCEDURE — 94761 N-INVAS EAR/PLS OXIMETRY MLT: CPT

## 2018-11-30 PROCEDURE — 93460 R&L HRT ART/VENTRICLE ANGIO: CPT | Mod: 26,,, | Performed by: INTERNAL MEDICINE

## 2018-11-30 PROCEDURE — 25500020 PHARM REV CODE 255

## 2018-11-30 PROCEDURE — 4A023N8 MEASUREMENT OF CARDIAC SAMPLING AND PRESSURE, BILATERAL, PERCUTANEOUS APPROACH: ICD-10-PCS | Performed by: INTERNAL MEDICINE

## 2018-11-30 PROCEDURE — 36415 COLL VENOUS BLD VENIPUNCTURE: CPT

## 2018-11-30 PROCEDURE — 63600175 PHARM REV CODE 636 W HCPCS

## 2018-11-30 PROCEDURE — B2111ZZ FLUOROSCOPY OF MULTIPLE CORONARY ARTERIES USING LOW OSMOLAR CONTRAST: ICD-10-PCS | Performed by: INTERNAL MEDICINE

## 2018-11-30 PROCEDURE — 93460 R&L HRT ART/VENTRICLE ANGIO: CPT

## 2018-11-30 PROCEDURE — 21400001 HC TELEMETRY ROOM

## 2018-11-30 RX ORDER — SODIUM CHLORIDE 9 MG/ML
INJECTION, SOLUTION INTRAVENOUS CONTINUOUS
Status: DISCONTINUED | OUTPATIENT
Start: 2018-11-30 | End: 2018-11-30

## 2018-11-30 RX ORDER — SODIUM CHLORIDE 9 MG/ML
INJECTION, SOLUTION INTRAVENOUS CONTINUOUS
Status: ACTIVE | OUTPATIENT
Start: 2018-11-30 | End: 2018-11-30

## 2018-11-30 RX ADMIN — ISOSORBIDE MONONITRATE 60 MG: 60 TABLET, EXTENDED RELEASE ORAL at 09:11

## 2018-11-30 RX ADMIN — HYDRALAZINE HYDROCHLORIDE 25 MG: 25 TABLET, FILM COATED ORAL at 06:11

## 2018-11-30 RX ADMIN — INSULIN DETEMIR 15 UNITS: 100 INJECTION, SOLUTION SUBCUTANEOUS at 08:11

## 2018-11-30 RX ADMIN — PANTOPRAZOLE SODIUM 40 MG: 40 TABLET, DELAYED RELEASE ORAL at 09:11

## 2018-11-30 RX ADMIN — SODIUM BICARBONATE TAB 325 MG 325 MG: 325 TAB at 09:11

## 2018-11-30 RX ADMIN — HYDRALAZINE HYDROCHLORIDE 25 MG: 25 TABLET, FILM COATED ORAL at 09:11

## 2018-11-30 RX ADMIN — SODIUM BICARBONATE TAB 325 MG 325 MG: 325 TAB at 08:11

## 2018-11-30 RX ADMIN — ATORVASTATIN CALCIUM 80 MG: 40 TABLET, FILM COATED ORAL at 09:11

## 2018-11-30 RX ADMIN — AMLODIPINE BESYLATE 5 MG: 5 TABLET ORAL at 09:11

## 2018-11-30 RX ADMIN — FUROSEMIDE 80 MG: 10 INJECTION, SOLUTION INTRAMUSCULAR; INTRAVENOUS at 09:11

## 2018-11-30 RX ADMIN — METOPROLOL TARTRATE 25 MG: 25 TABLET ORAL at 09:11

## 2018-11-30 RX ADMIN — INSULIN ASPART 2 UNITS: 100 INJECTION, SOLUTION INTRAVENOUS; SUBCUTANEOUS at 08:11

## 2018-11-30 RX ADMIN — ACETAMINOPHEN 650 MG: 325 TABLET ORAL at 11:11

## 2018-11-30 RX ADMIN — ASPIRIN 81 MG: 81 TABLET, COATED ORAL at 09:11

## 2018-11-30 RX ADMIN — CLOPIDOGREL BISULFATE 75 MG: 75 TABLET ORAL at 09:11

## 2018-11-30 RX ADMIN — METOPROLOL TARTRATE 25 MG: 25 TABLET ORAL at 08:11

## 2018-11-30 RX ADMIN — SODIUM CHLORIDE: 0.9 INJECTION, SOLUTION INTRAVENOUS at 05:11

## 2018-11-30 RX ADMIN — SODIUM CHLORIDE: 0.9 INJECTION, SOLUTION INTRAVENOUS at 12:11

## 2018-11-30 NOTE — INTERVAL H&P NOTE
The patient has been examined and the H&P has been reviewed:    I concur with the findings and no changes have occurred since H&P was written.    Anesthesia/Surgery risks, benefits and alternative options discussed and understood by patient/family.  I have explained the risks, benefits , and alternatives of the procedure in detail.the patient voices understanding and all questions have been answered.the patient agrees to proceed as planned.  PATIENT PRESENTS WITH NSTEMI ACUTE CHF WALL MOTION ABNORMALITIES SUGGESTIVE LAD INFRACT HAS SEVRE RENAL INSUFFICIENCY STAGE 4 HER RISK OF CONTRAST NEPHROPATHY WORSENING IS 57% AND RISK OF HD 13%. SHE WAS COUNSELSED ABOUT THAT SPECIFICALLY AND THE HIGH LIKELIHOOD FROM DIALYSIS AFTER HEART CATH SHE UNDERSTANDS AND IS WILLING TO PROCEED ACCORDINGLY. NEPHROLOGY ON BOARD AND IN AGREEMENT.        Active Hospital Problems    Diagnosis  POA    *NSTEMI (non-ST elevated myocardial infarction) [I21.4]  Yes    Stage 5 chronic kidney disease [N18.5]  Unknown    Heart failure [I50.9]  Unknown    Shortness of breath [R06.02]  Unknown    Diabetic nephropathy associated with type 2 diabetes mellitus [E11.21]  Unknown    Non-ST elevation MI (NSTEMI) [I21.4]  Yes    Pulmonary hypertension [I27.20]  Yes    Essential hypertension [I10]  Yes     Chronic    Hyperlipidemia [E78.5]  Yes     Chronic    GREGG on CKD, stage IV [N18.4]  Yes    Acute hypoxemic respiratory failure secondary to acute decompensated HF [J96.01]  Yes    Anemia in stage 4 chronic kidney disease [N18.4, D63.1]  Yes     Chronic    Controlled type 2 diabetes mellitus with diabetic polyneuropathy, with long-term current use of insulin [E11.42, Z79.4]  Not Applicable     Chronic      Resolved Hospital Problems   No resolved problems to display.

## 2018-11-30 NOTE — PROGRESS NOTES
[facility] -   Nephrology  Progress Note    Patient Name: Avril Monzon  MRN: [id number]  Admission Date: 11/26/2018  Hospital Length of Stay: 4 days  Attending Provider: Zi Landon MD   Primary Care Physician: Rose Parks MD  Principal Problem:NSTEMI (non-ST elevated myocardial infarction)    Subjective:     HPI: Avril Monzon is a pleasant 73-year-old  woman with history of hypertension, type 2 diabetes, CKD stage 4, followed in our clinic by Dr. Doe, baseline serum creatinine about 2.5-3, patient also has coronary artery disease, she presents to the emergency room with some chest discomfort and shortness of breath, patient diagnosed with NSTEMI, also acute on chronic kidney disease, serum creatinine at 4 mg/dL, likely due to hemodynamic reasons, we were consulted for acute on chronic renal failure, possible need for left heart catheterization,    Interval History: Pt was seen and examined. Chart and h/o reviewed. Pt is a 74 y/o female with Prior CKD stage 4, now chronically worsened to stage 5, DM, HTN, combined systolic (EF 45%) and diastolic CHF, who presented with a cough. Pt had NSTEMI. Pt has been seen by cardiology and a LHC is planned for today. Pt has no new c/o's, stable last pm, no CP, no SOB, no cough, no palpitation. No new events overnight.    Review of patient's allergies indicates:   Allergen Reactions    Codeine Swelling    Darvon [propoxyphene] Swelling     Current Facility-Administered Medications   Medication Frequency    0.9%  NaCl infusion (for blood administration) Q24H PRN    acetaminophen tablet 650 mg Q6H PRN    amLODIPine tablet 5 mg Daily    aspirin EC tablet 81 mg Daily    atorvastatin tablet 80 mg Daily    calcifediol Cs24 30 mcg Daily    clopidogrel tablet 75 mg Daily    dextrose 50% injection 12.5 g PRN    furosemide injection 80 mg BID    glucagon (human recombinant) injection 1 mg PRN    glucose chewable tablet 16 g PRN     glucose chewable tablet 24 g PRN    hydrALAZINE tablet 25 mg Q8H    insulin aspart U-100 pen 1-10 Units QID (AC + HS) PRN    insulin detemir U-100 pen 15 Units QHS    isosorbide mononitrate 24 hr tablet 60 mg Daily    metoprolol tartrate (LOPRESSOR) tablet 25 mg BID    nitroGLYCERIN SL tablet 0.4 mg Q5 Min PRN    ondansetron injection 4 mg Q6H PRN    pantoprazole EC tablet 40 mg Daily    promethazine (PHENERGAN) 6.25 mg in dextrose 5 % 50 mL IVPB Q6H PRN    sodium bicarbonate tablet 325 mg TID       Objective:     Vital Signs (Most Recent):  Temp: 98.9 °F (37.2 °C) (11/30/18 0818)  Pulse: 68 (11/30/18 0818)  Resp: 16 (11/30/18 0818)  BP: 126/61 (11/30/18 0818)  SpO2: 98 % (11/30/18 0836)  O2 Device (Oxygen Therapy): nasal cannula (11/30/18 0836) Vital Signs (24h Range):  Temp:  [98.2 °F (36.8 °C)-99.3 °F (37.4 °C)] 98.9 °F (37.2 °C)  Pulse:  [62-75] 68  Resp:  [16-18] 16  SpO2:  [92 %-100 %] 98 %  BP: (109-146)/(56-67) 126/61     Weight: 67.4 kg (148 lb 9.4 oz) (11/30/18 0555)  Body mass index is 24.73 kg/m².  Body surface area is 1.76 meters squared.    I/O last 3 completed shifts:  In: 858 [P.O.:720; I.V.:138]  Out: 2300 [Urine:2300]    Physical Exam   Constitutional: She is oriented to person, place, and time. She appears well-developed and well-nourished.   Neck: No JVD present.   Cardiovascular: Normal rate, regular rhythm and normal heart sounds. Exam reveals no friction rub.   Pulmonary/Chest: Effort normal and breath sounds normal. No respiratory distress. She has no rales.   Abdominal: Soft. Bowel sounds are normal.   Musculoskeletal: She exhibits no edema.   Neurological: She is alert and oriented to person, place, and time.   Skin: Skin is warm and dry.   Psychiatric: She has a normal mood and affect. Her behavior is normal.   Nursing note and vitals reviewed.      Significant Labs: reviewed  BMP  Lab Results   Component Value Date     11/30/2018    K 3.8 11/30/2018     11/30/2018     CO2 24 11/30/2018    BUN 70 (H) 11/30/2018    CREATININE 4.4 (H) 11/30/2018    CALCIUM 9.0 11/30/2018    ANIONGAP 13 11/30/2018    ESTGFRAFRICA 11 (A) 11/30/2018    EGFRNONAA 9 (A) 11/30/2018     Lab Results   Component Value Date    WBC 5.23 11/30/2018    HGB 9.2 (L) 11/30/2018    HCT 27.8 (L) 11/30/2018    MCV 90 11/30/2018     11/30/2018     Recent Labs   Lab 11/27/18  1358   TROPONINI 11.162*         Significant Imaging:  reviewed    Assessment/Plan:   72 y/o female with advanced CKD presented with NSTEMI:         Chronic kidney disease, stage V    Advanced CKD stage 5.  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  This pt will likely need HD in the long term any way, so LHC will likely not make a significant difference for the kidney, but could potentially help the heart.    K normal  Acid base stable  HTN: BP controlled.       * NSTEMI (non-ST elevated myocardial infarction)    Cardiac: CAD, NSTEMI  Worsened by acute decompensation of heart failure  Responding well to diuretics.  LHC planned for today  Pt understands the risks and agrees.         Plans and recommendations:  As discussed above    Angela Hanson MD  Nephrology  [facility]

## 2018-11-30 NOTE — PLAN OF CARE
Problem: Patient Care Overview  Goal: Plan of Care Review  Outcome: Ongoing (interventions implemented as appropriate)  Pt remains free of falls and free of injury this shift. Blood glucose and telemetry monitored as directed. Family has been at bedside off and on throughout shift. Ambulates in room with stand by assist. No complaints of pain this shift. Pt verbalizes understanding of what MDs have explained to her this shift. No further questions at this time.

## 2018-11-30 NOTE — OP NOTE
INPATIENT Operative Note         SUMMARY     Surgery Date: 11/30/2018     Surgeon(s) and Role:     * Michelle Holman MD - Primary    ASSISTANT:    Pre-op Diagnosis:  NSTEMI (non-ST elevated myocardial infarction) [I21.4]      Post-op Diagnosis:  nstemi    Procedure(s) (LRB):  CATHETERIZATION, HEART, BOTH LEFT AND RIGHT (N/A)    COMPLICATION:none    Anesthesia: RN IV Sedation    Findings/Key Components:  Lad prox 90% mid 50%   lcx prox 50   rca diffusley diseased 70%   Normal filling pressures   Moderate pulmonary htn.    Estimated Blood Loss: < 50 ML.         SPECIMEN: NONE    Devices/Prostetics: None    PLAN:  Discuss cabg vs l;ad atherectomy stent.

## 2018-11-30 NOTE — ASSESSMENT & PLAN NOTE
- Likely cardiorenal syndrome  - Diuresis continued  - strict I&O's  - Avoid nephrotoxic agents.  Hold home ACEi  - Nephrology following   - Cardiac Cath planned for tomorrow, 12/1/18  - Baseline creatinine 2.5-3

## 2018-11-30 NOTE — ASSESSMENT & PLAN NOTE
Advanced CKD stage 5.  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  This pt will likely need HD in the long term any way, so LHC will likely not make a significant difference for the kidney, but could potentially help the heart.    K normal  Acid base stable  HTN: BP controlled.

## 2018-11-30 NOTE — ASSESSMENT & PLAN NOTE
The patient is a 73-year-old female with multiple medical problems including end-stage renal disease, hypertension, insulin-dependent diabetes, pulmonary hypertension and CVAs who was admitted and worked up for NSTEMI.  Cardiac catheterization shows severe multivessel coronary artery disease.  On the cath study, there appears to be extensive areas of calcification of the ascending and arch of the aorta.  In order to further assess the feasibility of coronary artery bypass grafting, a noncontrast CT scan of the chest will be obtained to better assess extent of calcification of the ascending and arch of the aorta.

## 2018-11-30 NOTE — ASSESSMENT & PLAN NOTE
- Cardiology following  - Continue Lasix 80 mg IVP BID  - Strict I&O's, daily weights  - Na/fluid restriction.  - 2D echo results showed EF 45% with diastolic dysfunction, PAP 69, +WMA, and mild to moderate MR.   - Cardiology and Pulmonology following  - Weaned off Vapotherm, currently on nasal cannula

## 2018-11-30 NOTE — HPI
The patient is a 73-year-old female with past medical history of end-stage renal disease, hypertension, diabetes, CVAs and pulmonary hypertension who was worked up for complaints of shortness of breath.  The patient was in her usual state of health until about 5-6 days  ago when she developed shortness of breath and cough.  The patient noticed that her shortness of breath was worse on lying down and was somehow relieved when she sat up. In addition, she has increased ankle swelling.  She denies any chest pain, indigestion, shoulder or jaw pain, palpitation, fever or chills.      Patient had positive troponin in the emergency room and was admitted with a diagnosis of NSTEMI.  Cardiac catheterization today shows severe multivessel coronary artery disease with extensively calcification of the coronary vessels. Echocardiogram showed an ejection fraction of 45-50%.

## 2018-11-30 NOTE — CONSULTS
[facility] -   Cardiothoracic Surgery  Consult Note    Patient Name: Avril Monzon  MRN: [id number]  Admission Date: 11/26/2018  Attending Physician: Zi Landon MD  Referring Provider: Self, Aaareferral    Patient information was obtained from patient, relative(s) and ER records.     Consults  Subjective:     Principal Problem: NSTEMI (non-ST elevated myocardial infarction)    History of Present Illness: The patient is a 73-year-old female with past medical history of end-stage renal disease, hypertension, diabetes, CVAs and pulmonary hypertension who was worked up for complaints of shortness of breath.  The patient was in her usual state of health until about 5-6 days  ago when she developed shortness of breath and cough.  The patient noticed that her shortness of breath was worse on lying down and was somehow relieved when she sat up. In addition, she has increased ankle swelling.  She denies any chest pain, indigestion, shoulder or jaw pain, palpitation, fever or chills.      Patient had positive troponin in the emergency room and was admitted with a diagnosis of NSTEMI.  Cardiac catheterization today shows severe multivessel coronary artery disease with extensively calcification of the coronary vessels. Echocardiogram showed an ejection fraction of 45-50%.    No current facility-administered medications on file prior to encounter.      Current Outpatient Medications on File Prior to Encounter   Medication Sig    amLODIPine (NORVASC) 10 MG tablet TAKE ONE TABLET BY MOUTH ONCE DAILY    atorvastatin (LIPITOR) 80 MG tablet Take 1 tablet (80 mg total) by mouth once daily.    blood sugar diagnostic Strp 1 strip by Misc.(Non-Drug; Combo Route) route 3 (three) times daily. relion ultima    calcifediol (RAYALDEE) 30 mcg Cs24 Take 30 mcg by mouth once daily.    hydrALAZINE (APRESOLINE) 50 MG tablet Take 1 tablet (50 mg total) by mouth 2 (two) times daily.    insulin NPH-insulin regular, 70/30,  (NOVOLIN 70/30) 100 unit/mL (70-30) injection INJECT SUBCUTANEOUSLY 30 UNITS IN THE MORNING AND INJECT 25 UNITS IN THE EVENING (Patient taking differently: INJECT SUBCUTANEOUSLY 30 UNITS IN THE MORNING AND INJECT 25 UNITS IN THE EVENING)    lancets (ACCU-CHEK SOFTCLIX LANCETS) Misc 1 lancet by Misc.(Non-Drug; Combo Route) route 3 (three) times daily.    lisinopril (PRINIVIL,ZESTRIL) 20 MG tablet Take 2 tablets (40 mg total) by mouth once daily.    sodium bicarbonate 325 MG tablet TAKE ONE TABLET BY MOUTH THREE TIMES DAILY    torsemide (DEMADEX) 20 MG Tab Take 1 tablet (20 mg total) by mouth once daily.       Review of patient's allergies indicates:   Allergen Reactions    Codeine Swelling    Darvon [propoxyphene] Swelling       Past Medical History:   Diagnosis Date    Acute hypoxemic respiratory failure 11/26/2018    Anemia     Arthritis     back, hand, knees    Back pain     Cataract     CKD stage G4/A3, GFR 15 - 29 and albumin creatinine ratio >300 mg/g     GFR 40% Jan 2014 and 33% ub 3/2014 ([facility])    Diabetic retinopathy     DM (diabetes mellitus) type II controlled, neurological manifestation     GERD (gastroesophageal reflux disease)     Glaucoma     Heart failure     Hyperlipidemia     Hypertension     NSTEMI (non-ST elevated myocardial infarction) 11/27/2018    Peripheral neuropathy     Polyneuropathy     Proteinuria     >6 mo     Seizures     [facility] 1/2014 -dick CT NRI showed small vessel    Stroke 2012,2014    Tobacco dependence     Type 2 diabetes with peripheral circulatory disorder, controlled      Past Surgical History:   Procedure Laterality Date    BREAST MASS EXCISION      ,benign, age 15.    HYSTERECTOMY  1982    wrist cyst Right 1980s    dorsal wrist cyst     Family History     Problem Relation (Age of Onset)    Cancer Maternal Grandmother    Diabetes Mother    Heart disease Mother    Hyperlipidemia Mother    Hypertension Mother    Muscular dystrophy Son        Tobacco Use     Smoking status: Former Smoker     Packs/day: 1.50     Years: 30.00     Pack years: 45.00     Types: Cigarettes     Last attempt to quit: 1990     Years since quittin.9    Smokeless tobacco: Former User   Substance and Sexual Activity    Alcohol use: No     Alcohol/week: 0.0 oz    Drug use: No    Sexual activity: No     Review of Systems   Constitutional: Positive for activity change and appetite change. Negative for chills, diaphoresis and fever.   HENT: Positive for hearing loss. Negative for congestion and ear pain.    Eyes: Positive for visual disturbance. Negative for pain and discharge.   Respiratory: Positive for cough and shortness of breath. Negative for choking and chest tightness.    Cardiovascular: Positive for leg swelling. Negative for chest pain and palpitations.   Gastrointestinal: Positive for constipation. Negative for abdominal distention, abdominal pain, anal bleeding and blood in stool.   Endocrine: Negative for cold intolerance, heat intolerance and polyuria.   Genitourinary: Negative for difficulty urinating, dysuria and frequency.   Musculoskeletal: Positive for back pain. Negative for gait problem.   Allergic/Immunologic: Positive for environmental allergies. Negative for food allergies.   Neurological: Positive for dizziness and light-headedness. Negative for facial asymmetry.   Hematological: Does not bruise/bleed easily.   Psychiatric/Behavioral: Negative for agitation and behavioral problems.     Objective:     Vital Signs (Most Recent):  Temp: 98 °F (36.7 °C) (18 1645)  Pulse: 75 (18 1700)  Resp: 14 (18 1700)  BP: (!) 146/57 (18 1700)  SpO2: 100 % (18 1700) Vital Signs (24h Range):  Temp:  [98 °F (36.7 °C)-99.3 °F (37.4 °C)] 98 °F (36.7 °C)  Pulse:  [62-76] 75  Resp:  [14-18] 14  SpO2:  [94 %-100 %] 100 %  BP: (125-146)/(51-67) 146/57     Weight: 67.4 kg (148 lb 9.4 oz)  Body mass index is 24.73 kg/m².    SpO2: 100 %  O2 Device (Oxygen  Therapy): nasal cannula w/ humidification     Intake/Output - Last 3 Shifts       11/28 0700 - 11/29 0659 11/29 0700 - 11/30 0659 11/30 0700 - 12/01 0659    P.O. 585 720     I.V. (mL/kg) 225.5 (3.3)      Blood       IV Piggyback 19.6      Total Intake(mL/kg) 830.2 (12.2) 720 (10.7)     Urine (mL/kg/hr) 1765 (1.1) 1500 (0.9) 950 (1.4)    Total Output 1765 1500 950    Net -934.8 -780 -950                  Lines/Drains/Airways     Peripheral Intravenous Line                 Peripheral IV - Single Lumen 11/26/18 1916 Right Antecubital 3 days                 STS Risk Score:   Risk of Mortality: 5.348%,   Permanent Stroke: 6.881%,   Prolonged Ventilation: 19.972%  DSW Infection: 0.274%   Reoperation: 3.032%   Morbidity or Mortality: 71.490%   Short Length of Stay: 7.122%   Long Length of Stay: 27.042%      Physical Exam   Constitutional: She is oriented to person, place, and time. She appears well-developed and well-nourished. No distress.   HENT:   Head: Normocephalic and atraumatic.   Mouth/Throat: Oropharynx is clear and moist.   Eyes: EOM are normal. Pupils are equal, round, and reactive to light.   Neck: No JVD present.   Cardiovascular: Normal rate, regular rhythm and normal heart sounds.   Pulmonary/Chest: Effort normal and breath sounds normal.   Abdominal: Soft. Bowel sounds are normal. She exhibits no distension.   Musculoskeletal: She exhibits no edema or deformity.   Neurological: She is alert and oriented to person, place, and time.   Skin: Skin is warm and dry. She is not diaphoretic.   Psychiatric: She has a normal mood and affect.       Significant Labs:  All pertinent labs from the last 24 hours have been reviewed.    Significant Diagnostics:  I have reviewed all pertinent imaging results/findings within the past 24 hours.    Assessment/Plan:     NYHA Score: NYHA III: marked limitation of physical activity, comfortable at rest    Coronary artery disease involving native coronary artery of native heart     The patient is a 73-year-old female with multiple medical problems including end-stage renal disease, hypertension, insulin-dependent diabetes, pulmonary hypertension and CVAs who was admitted and worked up for NSTEMI.  Cardiac catheterization shows severe multivessel coronary artery disease.  On the cath study, there appears to be extensive areas of calcification of the ascending and arch of the aorta.  In order to further assess the feasibility of coronary artery bypass grafting, a noncontrast CT scan of the chest will be obtained to better assess extent of calcification of the ascending and arch of the aorta.         Thank you for your consult. I will follow-up with patient. Please contact us if you have any additional questions.    Derek Moore MD  Cardiothoracic Surgery  [facility]

## 2018-11-30 NOTE — ASSESSMENT & PLAN NOTE
- BP stable  - Resume home Hydralazine and Amlodipine  - Continue BB and diuresis as above  - Continue to hold ACEI

## 2018-11-30 NOTE — ASSESSMENT & PLAN NOTE
Cardiac: CAD, NSTEMI  Worsened by acute decompensation of heart failure  Responding well to diuretics.  LHC planned for today  Pt understands the risks and agrees.

## 2018-11-30 NOTE — NURSING TRANSFER
Nursing Transfer Note      11/30/2018     Transfer To: 208    Transfer via bed    Transfer with O2, cardiac monitoring    Transported by YAS Bragg    Medicines sent: NONE    Chart send with patient: Yes    Notified: daughter, sibling    Patient reassessed at: 11/30/2018 1715    Upon arrival to floor: TRANSFERRED TO ROOM. TRANSFERRED TO BED.  TELEMETRY MONITER ON.  IV FLUIDS ON PUMP AT PRESCRIBED RATE.  PROCEDURAL ACCESS SITE WITHOUT BLEEDING OR HEMATOMA.  DRESSING DRY AND INTACT.  CARE HANDED OFF TO CRISTAL

## 2018-11-30 NOTE — PROGRESS NOTES
[facility] Hospital Medicine  Progress Note    Patient Name: Avril Monzon  MRN: [id number]  Patient Class: IP- Inpatient   Admission Date: 11/26/2018  Length of Stay: 4 days  Attending Physician: Zi Landon MD  Primary Care Provider: Rose Parks MD        Subjective:     Principal Problem:NSTEMI (non-ST elevated myocardial infarction)    HPI:  Ms. Monzon is a 74 yo female with a PMHx of HTN, HLD, DM II, CKD stage IV, anemia of CKD, and h/o CVA.  She presented to the ED with c/o SOB that has progressively worsened since yesterday.  Associated nonproductive cough and subjective fever.  No aggravating or alleviating factors.  Denies any CP, palpitations, diaphoresis, N/V, orthopnea, PND, edema, weight gain, ABD pain, diarrhea, dysuria, decreased UOP, hematuria, back pain, HA, AMS, lightheadedness, dizziness, syncope, weakness, numbness, or chills.  Per EMS, patient hypoxic on arrival to scene with O2 sat 80% on RA.  She was given Duoneb x 1 and placed on 4L NC with improved O2 sat of 94%.  Upon arrival to ED, patient's O2 sat 82% on 4L NC.  She was placed on BiPAP with increased O2 sat of 100%.  Work-up in ED resulted cr. 4.1, BUN 69, glucose 336, BNP 1613, troponin 4.417, lactic 2.7 > 1.5.  ABG with pH 7.324, pCO2 31.5, pO2 144, HCO3 16.4, BE -10 on BiPAP.  CXR showed pulmonary vascular congestion, pulmonary edema, and small bilateral pleural effusions.  EKG showed normal sinus rhythm with inferior ST depression, no previous to compare.  ASA 325mg, IV lopressor 5mg, and IV Lasix 120mg given per ED.  Case discussed with Cardiology in ED and IV heparin drip initiated.  Hospital Medicine called for admission.  Currently, patient appears comfortable in NAD.  Reports SOB improved on BiPAP.  Denies any CP.  No h/o CAD.  Denies having stress test in the past.  2D echo in 6/2018 showed LVEF of 60-65%, normal diastolic function, and no significant valvular abnormalities.      Hospital Course:  Pt  admitted to Inpatient status for NSTEMI with Troponin significantly elevated.  Cardiology consulted with Heparin infusion initiated.  EKG positive for ST depression.  Pt placed on BIPAP due to respiratory distress and later weaned to high flow oxygen.  Pulmonology consulted.  Chest xray verified pulmonary congestion with positive response to diuresis noted.  BNP elevated. Pt noted to renal failure with Creatinine of 4 and Nephrology consulted.  Baseline creatinine 2.5-3 noted.  Echo results showed EF 45% with diastolic dysfunction, PAP 69, +WMA, Pulm HTN, and mild to moderate MR.  LHC planned for tomorrow. H/H 8.2/25.3 with 1 unit of PRBCs transfused.  Procalcitonin of 0.66.  Blood culture results pending.  Pt denies acute distress at this time. As of 11/30 R/LHC scheduled today showed Lad prox 90% mid 50% lcx prox 50% rca diffusely diseased 70% normal filling pressures moderate pulmonary htn. Cardiology recommending CABG vs LAD arthrectomy stent. CVT consulted placed, awaiting input.     Interval History: Pt seen and examined. CVT consulted for CABG vs LAD atherectomy       Review of Systems   Constitutional: Negative for activity change, appetite change, fatigue and fever.   HENT: Negative for congestion, facial swelling, sore throat, trouble swallowing and voice change.    Eyes: Negative for redness and visual disturbance.   Respiratory: Negative for apnea, cough, chest tightness, shortness of breath and wheezing.    Cardiovascular: Negative for chest pain (improved), palpitations and leg swelling.   Gastrointestinal: Negative for abdominal distention, abdominal pain, blood in stool, constipation, diarrhea, nausea and vomiting.   Genitourinary: Negative for decreased urine volume, difficulty urinating, dysuria, flank pain, frequency, hematuria, pelvic pain and urgency.   Musculoskeletal: Negative for back pain, gait problem and joint swelling.   Skin: Negative for color change and rash.   Neurological: Negative  for dizziness, syncope, weakness and headaches.   Hematological: Does not bruise/bleed easily.   Psychiatric/Behavioral: Negative for agitation, behavioral problems and confusion. The patient is not nervous/anxious.      Objective:     Vital Signs (Most Recent):  Temp: 99.3 °F (37.4 °C) (11/29/18 1132)  Pulse: 74 (11/29/18 1132)  Resp: 18 (11/29/18 1132)  BP: (!) 115/56 (11/29/18 1132)  SpO2: (!) 92 % (11/29/18 1132) Vital Signs (24h Range):  Temp:  [98.7 °F (37.1 °C)-100.3 °F (37.9 °C)] 99.3 °F (37.4 °C)  Pulse:  [70-80] 74  Resp:  [18-20] 18  SpO2:  [92 %-97 %] 92 %  BP: (103-132)/(51-92) 115/56     Weight: 68.2 kg (150 lb 5.7 oz)  Body mass index is 25.02 kg/m².    Intake/Output Summary (Last 24 hours) at 11/29/2018 1455  Last data filed at 11/29/2018 0500  Gross per 24 hour   Intake 138 ml   Output 800 ml   Net -662 ml      Physical Exam   Constitutional: She is oriented to person, place, and time. She appears well-developed and well-nourished. She is cooperative. She is easily aroused. No distress. Nasal cannula in place.   HENT:   Head: Normocephalic and atraumatic.   Eyes: EOM are normal.   Neck: Normal range of motion. Neck supple.   Cardiovascular: Normal rate, regular rhythm and intact distal pulses.   Pulses:       Radial pulses are 2+ on the right side, and 2+ on the left side.        Dorsalis pedis pulses are 2+ on the right side, and 2+ on the left side.   Pulmonary/Chest: Effort normal and breath sounds normal. No accessory muscle usage or stridor. No tachypnea. No respiratory distress. She has no wheezes. She has no rales. She exhibits no tenderness.   Abdominal: Soft. Bowel sounds are normal. She exhibits no distension. There is no tenderness. There is no rebound and no guarding.   Genitourinary:   Genitourinary Comments: Deferred   Musculoskeletal: She exhibits edema.   Trace edema bilateral lower extremity   Neurological: She is alert, oriented to person, place, and time and easily aroused. No  sensory deficit.   Skin: Skin is warm. Capillary refill takes less than 2 seconds.   Psychiatric: She has a normal mood and affect. Her behavior is normal. Judgment and thought content normal.   Nursing note and vitals reviewed.      Significant Labs: All pertinent labs within the past 24 hours have been reviewed.    Significant Imaging: I have reviewed and interpreted all pertinent imaging results/findings within the past 24 hours.       Assessment/Plan:      * NSTEMI (non-ST elevated myocardial infarction)    - Cardiology following  - Serial troponin 4.417>>15.86>>16.308  - Continue ASA, Statin, BB  - 2D ECHO with EF 45-50% with diastolic dysfunction and pulmonary HTN  - [facility] today showed Lad prox 90% mid 50% lcx prox 50% rca diffusely diseased 70% normal filling pressures moderate pulmonary htn. She is at risk of contrast induced nephropathy post [facility]  - Cardiology recommending CABG vs LAD arthrectomy stent  - CVT consulted placed, awaiting input.     GREGG on CKD, stage IV    - Likely cardiorenal syndrome  - Diuresis continued  - strict I&O's  - Avoid nephrotoxic agents.  Hold home ACEi  - Nephrology following   - Baseline creatinine 2.5-3  - She is at risk of contrast induced nephropathy post [facility]     Hyperlipidemia    - Continue Statin     Essential hypertension    - BP stable  - Resume home Hydralazine and Amlodipine  - Continue BB and diuresis as above  - Continue to hold ACEI     Acute hypoxemic respiratory failure secondary to acute decompensated HF    - Cardiology following  - Continue Lasix 80 mg IVP BID  - Strict I&O's, daily weights  - Na/fluid restriction.  - 2D echo results showed EF 45% with diastolic dysfunction, PAP 69, +WMA, and mild to moderate MR.   - Cardiology and Pulmonology following  - Weaned off Vapotherm, currently on nasal cannula       Anemia in stage 4 chronic kidney disease    - Hgb/Hct 9.2/27.8 -- stable   - 1 unit of PRBCs transfused on 11/27/18  - Will continue to monitor and transfuse  as needed        Controlled type 2 diabetes mellitus with diabetic polyneuropathy, with long-term current use of insulin    - HgbA1c 6.7 -- contolled  - Accuchecks with moderate SSI  - Detemir 15 units SQ nightly             VTE Risk Mitigation (From admission, onward)        Ordered     IP VTE HIGH RISK PATIENT  Once      11/27/18 1342     Place sequential compression device  Until discontinued      11/27/18 1342              JAREN Guerrero  Department of Hospital Medicine   [facility]

## 2018-11-30 NOTE — ASSESSMENT & PLAN NOTE
- Likely cardiorenal syndrome  - Diuresis continued  - strict I&O's  - Avoid nephrotoxic agents.  Hold home ACEi  - Nephrology following   - Baseline creatinine 2.5-3  - She is at risk of contrast induced nephropathy post [facility]  - Vascular consulted to start the process for a AVF

## 2018-11-30 NOTE — PLAN OF CARE
Plan : [facility] today     11/30/18 1212   Discharge Reassessment   Assessment Type Discharge Planning Reassessment   Provided patient/caregiver education on the expected discharge date and the discharge plan No   Do you have any problems affording any of your prescribed medications? No   Discharge Plan A Home with family;Home Health   Discharge Plan B Home with family   Patient choice form signed by patient/caregiver N/A   Anticipated Discharge Disposition Home-Health   Can the patient answer the patient profile reliably? Yes, cognitively intact   How does the patient rate their overall health at the present time? Fair   Describe the patient's ability to walk at the present time. No restrictions   How often would a person be available to care for the patient? Whenever needed   Number of comorbid conditions (as recorded on the chart) Five or more   During the past month, has the patient often been bothered by feeling down, depressed or hopeless? No   During the past month, has the patient often been bothered by little interest or pleasure in doing things? No

## 2018-11-30 NOTE — ASSESSMENT & PLAN NOTE
- Hgb/Hct 9.2/27.8 -- stable   - 1 unit of PRBCs transfused on 11/27/18  - Will continue to monitor and transfuse as needed

## 2018-11-30 NOTE — SUBJECTIVE & OBJECTIVE
Interval History: Pt was seen and examined. Chart and h/o reviewed. Pt is a 72 y/o female with Prior CKD stage 4, now chronically worsened to stage 5, DM, HTN, combined systolic (EF 45%) and diastolic CHF, who presented with a cough. Pt had NSTEMI. Pt has been seen by cardiology and a [facility] is planned for today. Pt has no new c/o's, stable last pm, no CP, no SOB, no cough, no palpitation. No new events overnight.    Review of patient's allergies indicates:   Allergen Reactions    Codeine Swelling    Darvon [propoxyphene] Swelling     Current Facility-Administered Medications   Medication Frequency    0.9%  NaCl infusion (for blood administration) Q24H PRN    acetaminophen tablet 650 mg Q6H PRN    amLODIPine tablet 5 mg Daily    aspirin EC tablet 81 mg Daily    atorvastatin tablet 80 mg Daily    calcifediol Cs24 30 mcg Daily    clopidogrel tablet 75 mg Daily    dextrose 50% injection 12.5 g PRN    furosemide injection 80 mg BID    glucagon (human recombinant) injection 1 mg PRN    glucose chewable tablet 16 g PRN    glucose chewable tablet 24 g PRN    hydrALAZINE tablet 25 mg Q8H    insulin aspart U-100 pen 1-10 Units QID (AC + HS) PRN    insulin detemir U-100 pen 15 Units QHS    isosorbide mononitrate 24 hr tablet 60 mg Daily    metoprolol tartrate (LOPRESSOR) tablet 25 mg BID    nitroGLYCERIN SL tablet 0.4 mg Q5 Min PRN    ondansetron injection 4 mg Q6H PRN    pantoprazole EC tablet 40 mg Daily    promethazine (PHENERGAN) 6.25 mg in dextrose 5 % 50 mL IVPB Q6H PRN    sodium bicarbonate tablet 325 mg TID       Objective:     Vital Signs (Most Recent):  Temp: 98.9 °F (37.2 °C) (11/30/18 0818)  Pulse: 68 (11/30/18 0818)  Resp: 16 (11/30/18 0818)  BP: 126/61 (11/30/18 0818)  SpO2: 98 % (11/30/18 0836)  O2 Device (Oxygen Therapy): nasal cannula (11/30/18 0836) Vital Signs (24h Range):  Temp:  [98.2 °F (36.8 °C)-99.3 °F (37.4 °C)] 98.9 °F (37.2 °C)  Pulse:  [62-75] 68  Resp:  [16-18] 16  SpO2:  [92  %-100 %] 98 %  BP: (109-146)/(56-67) 126/61     Weight: 67.4 kg (148 lb 9.4 oz) (11/30/18 0555)  Body mass index is 24.73 kg/m².  Body surface area is 1.76 meters squared.    I/O last 3 completed shifts:  In: 858 [P.O.:720; I.V.:138]  Out: 2300 [Urine:2300]    Physical Exam   Constitutional: She is oriented to person, place, and time. She appears well-developed and well-nourished.   Neck: No JVD present.   Cardiovascular: Normal rate, regular rhythm and normal heart sounds. Exam reveals no friction rub.   Pulmonary/Chest: Effort normal and breath sounds normal. No respiratory distress. She has no rales.   Abdominal: Soft. Bowel sounds are normal.   Musculoskeletal: She exhibits no edema.   Neurological: She is alert and oriented to person, place, and time.   Skin: Skin is warm and dry.   Psychiatric: She has a normal mood and affect. Her behavior is normal.   Nursing note and vitals reviewed.      Significant Labs: reviewed  BMP  Lab Results   Component Value Date     11/30/2018    K 3.8 11/30/2018     11/30/2018    CO2 24 11/30/2018    BUN 70 (H) 11/30/2018    CREATININE 4.4 (H) 11/30/2018    CALCIUM 9.0 11/30/2018    ANIONGAP 13 11/30/2018    ESTGFRAFRICA 11 (A) 11/30/2018    EGFRNONAA 9 (A) 11/30/2018     Lab Results   Component Value Date    WBC 5.23 11/30/2018    HGB 9.2 (L) 11/30/2018    HCT 27.8 (L) 11/30/2018    MCV 90 11/30/2018     11/30/2018     Recent Labs   Lab 11/27/18  1358   TROPONINI 11.162*         Significant Imaging:  reviewed

## 2018-11-30 NOTE — PLAN OF CARE
Problem: Patient Care Overview  Goal: Plan of Care Review  Outcome: Ongoing (interventions implemented as appropriate)  POC reviewed with patient. Pt verbalized understanding  Pt remains free of injuries and falls; fall precaution in place.   NR on tele monitor.   IV intact; saline locked.  No other c/o at this time.  Oxygen at 3 L NC.  Bed low, side rails x2, call light in reach, personal belongings at bedside.  Reminded to call for assistance.  Repositioned independently.  Hourly rounding complete. Will continue to monitor.

## 2018-11-30 NOTE — SUBJECTIVE & OBJECTIVE
No current facility-administered medications on file prior to encounter.      Current Outpatient Medications on File Prior to Encounter   Medication Sig    amLODIPine (NORVASC) 10 MG tablet TAKE ONE TABLET BY MOUTH ONCE DAILY    atorvastatin (LIPITOR) 80 MG tablet Take 1 tablet (80 mg total) by mouth once daily.    blood sugar diagnostic Strp 1 strip by Misc.(Non-Drug; Combo Route) route 3 (three) times daily. relion ultima    calcifediol (RAYALDEE) 30 mcg Cs24 Take 30 mcg by mouth once daily.    hydrALAZINE (APRESOLINE) 50 MG tablet Take 1 tablet (50 mg total) by mouth 2 (two) times daily.    insulin NPH-insulin regular, 70/30, (NOVOLIN 70/30) 100 unit/mL (70-30) injection INJECT SUBCUTANEOUSLY 30 UNITS IN THE MORNING AND INJECT 25 UNITS IN THE EVENING (Patient taking differently: INJECT SUBCUTANEOUSLY 30 UNITS IN THE MORNING AND INJECT 25 UNITS IN THE EVENING)    lancets (ACCU-CHEK SOFTCLIX LANCETS) Misc 1 lancet by Misc.(Non-Drug; Combo Route) route 3 (three) times daily.    lisinopril (PRINIVIL,ZESTRIL) 20 MG tablet Take 2 tablets (40 mg total) by mouth once daily.    sodium bicarbonate 325 MG tablet TAKE ONE TABLET BY MOUTH THREE TIMES DAILY    torsemide (DEMADEX) 20 MG Tab Take 1 tablet (20 mg total) by mouth once daily.       Review of patient's allergies indicates:   Allergen Reactions    Codeine Swelling    Darvon [propoxyphene] Swelling       Past Medical History:   Diagnosis Date    Acute hypoxemic respiratory failure 11/26/2018    Anemia     Arthritis     back, hand, knees    Back pain     Cataract     CKD stage G4/A3, GFR 15 - 29 and albumin creatinine ratio >300 mg/g     GFR 40% Jan 2014 and 33% ub 3/2014 ([facility])    Diabetic retinopathy     DM (diabetes mellitus) type II controlled, neurological manifestation     GERD (gastroesophageal reflux disease)     Glaucoma     Heart failure     Hyperlipidemia     Hypertension     NSTEMI (non-ST elevated myocardial infarction) 11/27/2018     Peripheral neuropathy     Polyneuropathy     Proteinuria     >6 mo     Seizures     [facility] 2014 -dick CT NRI showed small vessel    Stroke ,    Tobacco dependence     Type 2 diabetes with peripheral circulatory disorder, controlled      Past Surgical History:   Procedure Laterality Date    BREAST MASS EXCISION      ,benign, age 15.    HYSTERECTOMY  1982    wrist cyst Right 1980s    dorsal wrist cyst     Family History     Problem Relation (Age of Onset)    Cancer Maternal Grandmother    Diabetes Mother    Heart disease Mother    Hyperlipidemia Mother    Hypertension Mother    Muscular dystrophy Son        Tobacco Use    Smoking status: Former Smoker     Packs/day: 1.50     Years: 30.00     Pack years: 45.00     Types: Cigarettes     Last attempt to quit: 1990     Years since quittin.9    Smokeless tobacco: Former User   Substance and Sexual Activity    Alcohol use: No     Alcohol/week: 0.0 oz    Drug use: No    Sexual activity: No     Review of Systems   Constitutional: Positive for activity change and appetite change. Negative for chills, diaphoresis and fever.   HENT: Positive for hearing loss. Negative for congestion and ear pain.    Eyes: Positive for visual disturbance. Negative for pain and discharge.   Respiratory: Positive for cough and shortness of breath. Negative for choking and chest tightness.    Cardiovascular: Positive for leg swelling. Negative for chest pain and palpitations.   Gastrointestinal: Positive for constipation. Negative for abdominal distention, abdominal pain, anal bleeding and blood in stool.   Endocrine: Negative for cold intolerance, heat intolerance and polyuria.   Genitourinary: Negative for difficulty urinating, dysuria and frequency.   Musculoskeletal: Positive for back pain. Negative for gait problem.   Allergic/Immunologic: Positive for environmental allergies. Negative for food allergies.   Neurological: Positive for dizziness and  light-headedness. Negative for facial asymmetry.   Hematological: Does not bruise/bleed easily.   Psychiatric/Behavioral: Negative for agitation and behavioral problems.     Objective:     Vital Signs (Most Recent):  Temp: 98 °F (36.7 °C) (11/30/18 1645)  Pulse: 75 (11/30/18 1700)  Resp: 14 (11/30/18 1700)  BP: (!) 146/57 (11/30/18 1700)  SpO2: 100 % (11/30/18 1700) Vital Signs (24h Range):  Temp:  [98 °F (36.7 °C)-99.3 °F (37.4 °C)] 98 °F (36.7 °C)  Pulse:  [62-76] 75  Resp:  [14-18] 14  SpO2:  [94 %-100 %] 100 %  BP: (125-146)/(51-67) 146/57     Weight: 67.4 kg (148 lb 9.4 oz)  Body mass index is 24.73 kg/m².    SpO2: 100 %  O2 Device (Oxygen Therapy): nasal cannula w/ humidification     Intake/Output - Last 3 Shifts       11/28 0700 - 11/29 0659 11/29 0700 - 11/30 0659 11/30 0700 - 12/01 0659    P.O. 585 720     I.V. (mL/kg) 225.5 (3.3)      Blood       IV Piggyback 19.6      Total Intake(mL/kg) 830.2 (12.2) 720 (10.7)     Urine (mL/kg/hr) 1765 (1.1) 1500 (0.9) 950 (1.4)    Total Output 1765 1500 950    Net -934.8 -780 -950                  Lines/Drains/Airways     Peripheral Intravenous Line                 Peripheral IV - Single Lumen 11/26/18 1916 Right Antecubital 3 days                 STS Risk Score:   Risk of Mortality: 5.348%,   Permanent Stroke: 6.881%,   Prolonged Ventilation: 19.972%  DSW Infection: 0.274%   Reoperation: 3.032%   Morbidity or Mortality: 71.490%   Short Length of Stay: 7.122%   Long Length of Stay: 27.042%      Physical Exam   Constitutional: She is oriented to person, place, and time. She appears well-developed and well-nourished. No distress.   HENT:   Head: Normocephalic and atraumatic.   Mouth/Throat: Oropharynx is clear and moist.   Eyes: EOM are normal. Pupils are equal, round, and reactive to light.   Neck: No JVD present.   Cardiovascular: Normal rate, regular rhythm and normal heart sounds.   Pulmonary/Chest: Effort normal and breath sounds normal.   Abdominal: Soft. Bowel  sounds are normal. She exhibits no distension.   Musculoskeletal: She exhibits no edema or deformity.   Neurological: She is alert and oriented to person, place, and time.   Skin: Skin is warm and dry. She is not diaphoretic.   Psychiatric: She has a normal mood and affect.       Significant Labs:  All pertinent labs from the last 24 hours have been reviewed.    Significant Diagnostics:  I have reviewed all pertinent imaging results/findings within the past 24 hours.

## 2018-12-01 LAB
ALBUMIN SERPL BCP-MCNC: 2.3 G/DL
ANION GAP SERPL CALC-SCNC: 13 MMOL/L
ANION GAP SERPL CALC-SCNC: 14 MMOL/L
BASOPHILS # BLD AUTO: 0.01 K/UL
BASOPHILS NFR BLD: 0.2 %
BUN SERPL-MCNC: 67 MG/DL
BUN SERPL-MCNC: 68 MG/DL
CALCIUM SERPL-MCNC: 9 MG/DL
CALCIUM SERPL-MCNC: 9.1 MG/DL
CHLORIDE SERPL-SCNC: 104 MMOL/L
CHLORIDE SERPL-SCNC: 104 MMOL/L
CO2 SERPL-SCNC: 23 MMOL/L
CO2 SERPL-SCNC: 23 MMOL/L
CREAT SERPL-MCNC: 4.1 MG/DL
CREAT SERPL-MCNC: 4.1 MG/DL
DIFFERENTIAL METHOD: ABNORMAL
EOSINOPHIL # BLD AUTO: 0.1 K/UL
EOSINOPHIL NFR BLD: 1.1 %
ERYTHROCYTE [DISTWIDTH] IN BLOOD BY AUTOMATED COUNT: 14 %
EST. GFR  (AFRICAN AMERICAN): 12 ML/MIN/1.73 M^2
EST. GFR  (AFRICAN AMERICAN): 12 ML/MIN/1.73 M^2
EST. GFR  (NON AFRICAN AMERICAN): 10 ML/MIN/1.73 M^2
EST. GFR  (NON AFRICAN AMERICAN): 10 ML/MIN/1.73 M^2
GLUCOSE SERPL-MCNC: 68 MG/DL
GLUCOSE SERPL-MCNC: 68 MG/DL
HCT VFR BLD AUTO: 28.5 %
HGB BLD-MCNC: 9.4 G/DL
LYMPHOCYTES # BLD AUTO: 0.4 K/UL
LYMPHOCYTES NFR BLD: 7.7 %
MCH RBC QN AUTO: 29.9 PG
MCHC RBC AUTO-ENTMCNC: 33 G/DL
MCV RBC AUTO: 91 FL
MONOCYTES # BLD AUTO: 0.4 K/UL
MONOCYTES NFR BLD: 7.3 %
NEUTROPHILS # BLD AUTO: 4.5 K/UL
NEUTROPHILS NFR BLD: 83.7 %
PHOSPHATE SERPL-MCNC: 4.3 MG/DL
PLATELET # BLD AUTO: 214 K/UL
PMV BLD AUTO: 9.4 FL
POCT GLUCOSE: 152 MG/DL (ref 70–110)
POCT GLUCOSE: 188 MG/DL (ref 70–110)
POCT GLUCOSE: 216 MG/DL (ref 70–110)
POCT GLUCOSE: 317 MG/DL (ref 70–110)
POCT GLUCOSE: 66 MG/DL (ref 70–110)
POCT GLUCOSE: 67 MG/DL (ref 70–110)
POTASSIUM SERPL-SCNC: 3.7 MMOL/L
POTASSIUM SERPL-SCNC: 3.8 MMOL/L
RBC # BLD AUTO: 3.14 M/UL
SODIUM SERPL-SCNC: 140 MMOL/L
SODIUM SERPL-SCNC: 141 MMOL/L
WBC # BLD AUTO: 5.33 K/UL

## 2018-12-01 PROCEDURE — 63600175 PHARM REV CODE 636 W HCPCS: Performed by: PHYSICIAN ASSISTANT

## 2018-12-01 PROCEDURE — 85025 COMPLETE CBC W/AUTO DIFF WBC: CPT

## 2018-12-01 PROCEDURE — 80048 BASIC METABOLIC PNL TOTAL CA: CPT

## 2018-12-01 PROCEDURE — 25000003 PHARM REV CODE 250: Performed by: INTERNAL MEDICINE

## 2018-12-01 PROCEDURE — 27000221 HC OXYGEN, UP TO 24 HOURS

## 2018-12-01 PROCEDURE — 25000003 PHARM REV CODE 250: Performed by: NURSE PRACTITIONER

## 2018-12-01 PROCEDURE — 25000003 PHARM REV CODE 250: Performed by: PHYSICIAN ASSISTANT

## 2018-12-01 PROCEDURE — 94761 N-INVAS EAR/PLS OXIMETRY MLT: CPT

## 2018-12-01 PROCEDURE — 99232 SBSQ HOSP IP/OBS MODERATE 35: CPT | Mod: ,,, | Performed by: INTERNAL MEDICINE

## 2018-12-01 PROCEDURE — 99233 SBSQ HOSP IP/OBS HIGH 50: CPT | Mod: ,,, | Performed by: INTERNAL MEDICINE

## 2018-12-01 PROCEDURE — 25000003 PHARM REV CODE 250: Performed by: EMERGENCY MEDICINE

## 2018-12-01 PROCEDURE — 21400001 HC TELEMETRY ROOM

## 2018-12-01 PROCEDURE — 36415 COLL VENOUS BLD VENIPUNCTURE: CPT

## 2018-12-01 PROCEDURE — 80069 RENAL FUNCTION PANEL: CPT

## 2018-12-01 RX ORDER — FUROSEMIDE 10 MG/ML
40 INJECTION INTRAMUSCULAR; INTRAVENOUS DAILY
Status: DISCONTINUED | OUTPATIENT
Start: 2018-12-01 | End: 2018-12-02

## 2018-12-01 RX ORDER — FUROSEMIDE 10 MG/ML
40 INJECTION INTRAMUSCULAR; INTRAVENOUS 2 TIMES DAILY
Status: DISCONTINUED | OUTPATIENT
Start: 2018-12-01 | End: 2018-12-01

## 2018-12-01 RX ADMIN — INSULIN ASPART 4 UNITS: 100 INJECTION, SOLUTION INTRAVENOUS; SUBCUTANEOUS at 09:12

## 2018-12-01 RX ADMIN — INSULIN ASPART 2 UNITS: 100 INJECTION, SOLUTION INTRAVENOUS; SUBCUTANEOUS at 05:12

## 2018-12-01 RX ADMIN — METOPROLOL TARTRATE 25 MG: 25 TABLET ORAL at 10:12

## 2018-12-01 RX ADMIN — METOPROLOL TARTRATE 25 MG: 25 TABLET ORAL at 09:12

## 2018-12-01 RX ADMIN — Medication 16 G: at 05:12

## 2018-12-01 RX ADMIN — CLOPIDOGREL BISULFATE 75 MG: 75 TABLET ORAL at 10:12

## 2018-12-01 RX ADMIN — SODIUM BICARBONATE TAB 325 MG 325 MG: 325 TAB at 03:12

## 2018-12-01 RX ADMIN — HYDRALAZINE HYDROCHLORIDE 25 MG: 25 TABLET, FILM COATED ORAL at 09:12

## 2018-12-01 RX ADMIN — ASPIRIN 81 MG: 81 TABLET, COATED ORAL at 10:12

## 2018-12-01 RX ADMIN — ACETAMINOPHEN 650 MG: 325 TABLET ORAL at 05:12

## 2018-12-01 RX ADMIN — ISOSORBIDE MONONITRATE 60 MG: 60 TABLET, EXTENDED RELEASE ORAL at 10:12

## 2018-12-01 RX ADMIN — ATORVASTATIN CALCIUM 80 MG: 40 TABLET, FILM COATED ORAL at 10:12

## 2018-12-01 RX ADMIN — PANTOPRAZOLE SODIUM 40 MG: 40 TABLET, DELAYED RELEASE ORAL at 10:12

## 2018-12-01 RX ADMIN — SODIUM BICARBONATE TAB 325 MG 325 MG: 325 TAB at 09:12

## 2018-12-01 RX ADMIN — INSULIN DETEMIR 15 UNITS: 100 INJECTION, SOLUTION SUBCUTANEOUS at 09:12

## 2018-12-01 RX ADMIN — HYDRALAZINE HYDROCHLORIDE 25 MG: 25 TABLET, FILM COATED ORAL at 05:12

## 2018-12-01 RX ADMIN — AMLODIPINE BESYLATE 5 MG: 5 TABLET ORAL at 10:12

## 2018-12-01 RX ADMIN — FUROSEMIDE 40 MG: 10 INJECTION, SOLUTION INTRAMUSCULAR; INTRAVENOUS at 03:12

## 2018-12-01 RX ADMIN — SODIUM BICARBONATE TAB 325 MG 325 MG: 325 TAB at 10:12

## 2018-12-01 NOTE — PROGRESS NOTES
[facility] Hospital Medicine  Progress Note    Patient Name: Avril Monzon  MRN: [id number]  Patient Class: IP- Inpatient   Admission Date: 11/26/2018  Length of Stay: 5 days  Attending Physician: Suhas Prakash, *  Primary Care Provider: Rose Parks MD        Subjective:     Principal Problem:NSTEMI (non-ST elevated myocardial infarction)    HPI:  Ms. Monzon is a 74 yo female with a PMHx of HTN, HLD, DM II, CKD stage IV, anemia of CKD, and h/o CVA.  She presented to the ED with c/o SOB that has progressively worsened since yesterday.  Associated nonproductive cough and subjective fever.  No aggravating or alleviating factors.  Denies any CP, palpitations, diaphoresis, N/V, orthopnea, PND, edema, weight gain, ABD pain, diarrhea, dysuria, decreased UOP, hematuria, back pain, HA, AMS, lightheadedness, dizziness, syncope, weakness, numbness, or chills.  Per EMS, patient hypoxic on arrival to scene with O2 sat 80% on RA.  She was given Duoneb x 1 and placed on 4L NC with improved O2 sat of 94%.  Upon arrival to ED, patient's O2 sat 82% on 4L NC.  She was placed on BiPAP with increased O2 sat of 100%.  Work-up in ED resulted cr. 4.1, BUN 69, glucose 336, BNP 1613, troponin 4.417, lactic 2.7 > 1.5.  ABG with pH 7.324, pCO2 31.5, pO2 144, HCO3 16.4, BE -10 on BiPAP.  CXR showed pulmonary vascular congestion, pulmonary edema, and small bilateral pleural effusions.  EKG showed normal sinus rhythm with inferior ST depression, no previous to compare.  ASA 325mg, IV lopressor 5mg, and IV Lasix 120mg given per ED.  Case discussed with Cardiology in ED and IV heparin drip initiated.  Hospital Medicine called for admission.  Currently, patient appears comfortable in NAD.  Reports SOB improved on BiPAP.  Denies any CP.  No h/o CAD.  Denies having stress test in the past.  2D echo in 6/2018 showed LVEF of 60-65%, normal diastolic function, and no significant valvular abnormalities.      Hospital  Course:  Pt admitted to Inpatient status for NSTEMI with Troponin significantly elevated.  Cardiology consulted with Heparin infusion initiated.  EKG positive for ST depression.  Pt placed on BIPAP due to respiratory distress and later weaned to high flow oxygen.  Pulmonology consulted.  Chest xray verified pulmonary congestion with positive response to diuresis noted.  BNP elevated. Pt noted to renal failure with Creatinine of 4 and Nephrology consulted.  Baseline creatinine 2.5-3 noted.  Echo results showed EF 45% with diastolic dysfunction, PAP 69, +WMA, Pulm HTN, and mild to moderate MR.  LHC planned for tomorrow. H/H 8.2/25.3 with 1 unit of PRBCs transfused.  Procalcitonin of 0.66.  Blood culture results pending.  Pt denies acute distress at this time. As of 11/30 R/LHC scheduled today showed Lad prox 90% mid 50% lcx prox 50% rca diffusely diseased 70% normal filling pressures moderate pulmonary htn. Cardiology recommending CABG vs LAD arthrectomy stent. CVT recommend to  Surgery to high risk .     Interval History:     Review of Systems   Constitutional: Negative for activity change, appetite change, fatigue and fever.   HENT: Negative for congestion, facial swelling, sore throat, trouble swallowing and voice change.    Eyes: Negative for redness and visual disturbance.   Respiratory: Negative for apnea, cough, chest tightness, shortness of breath and wheezing.    Cardiovascular: Negative for chest pain (improved), palpitations and leg swelling.   Gastrointestinal: Negative for abdominal distention, abdominal pain, blood in stool, constipation, diarrhea, nausea and vomiting.   Genitourinary: Negative for decreased urine volume, difficulty urinating, dysuria, flank pain, frequency, hematuria, pelvic pain and urgency.   Musculoskeletal: Negative for back pain, gait problem and joint swelling.   Skin: Negative for color change and rash.   Neurological: Negative for dizziness, syncope, weakness and headaches.    Hematological: Does not bruise/bleed easily.   Psychiatric/Behavioral: Negative for agitation, behavioral problems and confusion. The patient is not nervous/anxious.      Objective:     Vital Signs (Most Recent):  Temp: 99.9 °F (37.7 °C) (12/01/18 1125)  Pulse: 78 (12/01/18 1125)  Resp: 14 (12/01/18 1125)  BP: (!) 108/55 (12/01/18 1125)  SpO2: 97 % (12/01/18 1125) Vital Signs (24h Range):  Temp:  [98 °F (36.7 °C)-100.8 °F (38.2 °C)] 99.9 °F (37.7 °C)  Pulse:  [71-88] 78  Resp:  [14-20] 14  SpO2:  [94 %-100 %] 97 %  BP: (108-155)/(51-69) 108/55     Weight: 68.7 kg (151 lb 7.3 oz)  Body mass index is 25.2 kg/m².    Intake/Output Summary (Last 24 hours) at 12/1/2018 1551  Last data filed at 12/1/2018 0600  Gross per 24 hour   Intake 470 ml   Output 1200 ml   Net -730 ml      Physical Exam   Constitutional: She is oriented to person, place, and time. She appears well-developed and well-nourished.   Neck: No JVD present.   Cardiovascular: Normal rate, regular rhythm and normal heart sounds. Exam reveals no friction rub.   Pulmonary/Chest: Effort normal and breath sounds normal. No respiratory distress. She has no rales.   Abdominal: Soft. Bowel sounds are normal.   Musculoskeletal: She exhibits no edema.   Neurological: She is alert and oriented to person, place, and time.   Skin: Skin is warm and dry.   Psychiatric: She has a normal mood and affect. Her behavior is normal.   Nursing note and vitals reviewed.      Significant Labs: All pertinent labs within the past 24 hours have been reviewed.    Significant Imaging: I have reviewed all pertinent imaging results/findings within the past 24 hours.    Assessment/Plan:      * NSTEMI (non-ST elevated myocardial infarction)    - Cardiology following  - Serial troponin 4.417>>15.86>>16.308  - Continue ASA, Statin, BB  - 2D ECHO with EF 45-50% with diastolic dysfunction and pulmonary HTN  - [facility] today showed Lad prox 90% mid 50% lcx prox 50% rca diffusely diseased 70% normal  filling pressures moderate pulmonary htn. She is at risk of contrast induced nephropathy post LHC  - Cardiology recommending CABG vs LAD arthrectomy stent  - CVT consulted . Recommend  No surgery due to high risk   -Pt will have  Angiogram on  monday      GREGG on CKD, stage IV    - Likely cardiorenal syndrome  - Diuresis continued  - strict I&O's  - Avoid nephrotoxic agents.  Hold home ACEi  - Nephrology following   - Baseline creatinine 2.5-3  - She is at risk of contrast induced nephropathy post LHC  - Vascular consulted to start the process for a AVF      Hyperlipidemia    - Continue Statin     Essential hypertension    - BP stable  - Resume home Hydralazine and Amlodipine  - Continue BB and diuresis as above  - Continue to hold ACEI     Acute hypoxemic respiratory failure secondary to acute decompensated HF    - Cardiology following  - Continue Lasix 80 mg IVP BID  - Strict I&O's, daily weights  - Na/fluid restriction.  - 2D echo results showed EF 45% with diastolic dysfunction, PAP 69, +WMA, and mild to moderate MR.   - Cardiology and Pulmonology following  - Weaned off Vapotherm, currently on nasal cannula       Anemia in stage 4 chronic kidney disease    - Hgb/Hct 9.2/27.8 -- stable   - 1 unit of PRBCs transfused on 11/27/18  - Will continue to monitor and transfuse as needed        Controlled type 2 diabetes mellitus with diabetic polyneuropathy, with long-term current use of insulin    - HgbA1c 6.7 -- contolled  - Accuchecks with moderate SSI  - Detemir 15 units SQ nightly             VTE Risk Mitigation (From admission, onward)        Ordered     IP VTE HIGH RISK PATIENT  Once      11/27/18 1342     Place sequential compression device  Until discontinued      11/27/18 1342              Suhas Prakash MD  Department of Hospital Medicine   [facility]

## 2018-12-01 NOTE — ASSESSMENT & PLAN NOTE
- Cardiology following  - Serial troponin 4.417>>15.86>>16.308  - Continue ASA, Statin, BB  - 2D ECHO with EF 45-50% with diastolic dysfunction and pulmonary HTN  - LHC today showed Lad prox 90% mid 50% lcx prox 50% rca diffusely diseased 70% normal filling pressures moderate pulmonary htn. She is at risk of contrast induced nephropathy post LHC  - Cardiology recommending CABG vs LAD arthrectomy stent  - CVT consulted . Recommend  No surgery due to high risk   -Pt will have  Angiogram on  monday

## 2018-12-01 NOTE — ASSESSMENT & PLAN NOTE
P.o. bicarb.  Strict I&Os.  Monitor creatinine bicarb level.  11/28 continue p.o. bicarb.  Strict I&Os.  Monitor BMP  11/29 stable creatinine , aware of risks and possible HD need   12/1 stable creatinine.

## 2018-12-01 NOTE — ASSESSMENT & PLAN NOTE
Cardiac monitoring  IV heparin, aspirin , statin , beta-blocker, cardiology follow-up  11/28 same.  Monitor PTT  11/29 IV heparin, Monitor PTT. aspirin , statin , beta-blocker, [facility]   12/1 aspirin statin Plavix beta-blocker.  LAD stent hysterectomy.  High-risk for cardiac surgery.

## 2018-12-01 NOTE — ASSESSMENT & PLAN NOTE
Strict I&Os, IV Lasix 80 mg q.12 hours.  Repeat chest x-ray in a.m.  Vapotherm keep O2 sat above 89%, BiPAP p.r.n. Distress  11/28 wean O2 , BiPAP p.r.n., continue IV Lasix 80 mg q.12 hours.  Strict I&Os.  11/28 repeat CXR , BNP  , remaining on 5 liters O2   12/1 oxygen requirement improving.  97% O2 sat on 2 L oxygen.  Decrease Lasix to 40 mg q.12 hours IV.

## 2018-12-01 NOTE — SUBJECTIVE & OBJECTIVE
Review of Systems   Constitution: Negative.   HENT: Negative.    Eyes: Negative.    Cardiovascular: Negative.    Respiratory: Negative.    Endocrine: Negative.    Hematologic/Lymphatic: Negative.    Skin: Negative.    Musculoskeletal: Negative.    Gastrointestinal: Negative.    Genitourinary: Negative.    Neurological: Negative.    Psychiatric/Behavioral: Negative.    Allergic/Immunologic: Negative.      Objective:     Vital Signs (Most Recent):  Temp: 99.9 °F (37.7 °C) (12/01/18 1125)  Pulse: 78 (12/01/18 1125)  Resp: 14 (12/01/18 1125)  BP: (!) 108/55 (12/01/18 1125)  SpO2: 97 % (12/01/18 1125) Vital Signs (24h Range):  Temp:  [98 °F (36.7 °C)-100.8 °F (38.2 °C)] 99.9 °F (37.7 °C)  Pulse:  [71-88] 78  Resp:  [14-20] 14  SpO2:  [94 %-100 %] 97 %  BP: (108-155)/(51-69) 108/55     Weight: 68.7 kg (151 lb 7.3 oz)  Body mass index is 25.2 kg/m².     SpO2: 97 %  O2 Device (Oxygen Therapy): nasal cannula      Intake/Output Summary (Last 24 hours) at 12/1/2018 1204  Last data filed at 12/1/2018 0600  Gross per 24 hour   Intake 485.83 ml   Output 1500 ml   Net -1014.17 ml       Lines/Drains/Airways     Peripheral Intravenous Line                 Peripheral IV - Single Lumen 12/01/18 0622 Right Forearm less than 1 day                Physical Exam   Constitutional: She is oriented to person, place, and time. She appears well-developed and well-nourished. No distress.   HENT:   Head: Normocephalic and atraumatic.   Eyes: Pupils are equal, round, and reactive to light. Right eye exhibits no discharge. Left eye exhibits no discharge.   Neck: Neck supple. No JVD present. No thyromegaly present.   Cardiovascular: Normal rate, regular rhythm, S1 normal and S2 normal.   No murmur heard.  Pulmonary/Chest: Effort normal and breath sounds normal. No respiratory distress. She has no wheezes. She has no rales.   Abdominal: Soft. She exhibits no distension. There is no tenderness. There is no rebound.   Musculoskeletal: She exhibits  no edema.   Neurological: She is alert and oriented to person, place, and time.   Skin: Skin is warm and dry. She is not diaphoretic. No erythema.   Groin access site C/D/I; no bleeding erythema or drainage   Psychiatric: She has a normal mood and affect. Her behavior is normal. Thought content normal.   Nursing note and vitals reviewed.      Significant Labs:   CMP   Recent Labs   Lab 11/30/18  0321 12/01/18  0441    141  140   K 3.8 3.7  3.8    104  104   CO2 24 23  23   * 68*  68*   BUN 70* 68*  67*   CREATININE 4.4* 4.1*  4.1*   CALCIUM 9.0 9.0  9.1   ALBUMIN 2.3* 2.3*   ANIONGAP 13 14  13   ESTGFRAFRICA 11* 12*  12*   EGFRNONAA 9* 10*  10*   , CBC   Recent Labs   Lab 11/30/18  0322 12/01/18  0441   WBC 5.23 5.33   HGB 9.2* 9.4*   HCT 27.8* 28.5*    214   , Troponin No results for input(s): TROPONINI in the last 48 hours. and All pertinent lab results from the last 24 hours have been reviewed.    Significant Imaging: Echocardiogram:   2D echo with color flow doppler:   Results for orders placed or performed during the hospital encounter of 11/26/18   2D echo with color flow doppler   Result Value Ref Range    QEF 45 55 - 65    Mitral Valve Regurgitation MILD TO MODERATE     Diastolic Dysfunction Yes (A)     Est. PA Systolic Pressure 69.15 (A)     Tricuspid Valve Regurgitation MILD     and X-Ray: CXR: X-Ray Chest 1 View (CXR): No results found for this visit on 11/26/18. and X-Ray Chest PA and Lateral (CXR): No results found for this visit on 11/26/18.

## 2018-12-01 NOTE — SUBJECTIVE & OBJECTIVE
Interval History:   Review of Systems   Constitutional: Negative for chills and fever.   HENT: Negative for hearing loss and nosebleeds.    Eyes: Negative for discharge.   Respiratory: Positive for shortness of breath. Negative for hemoptysis.    Cardiovascular: Negative for chest pain.   Gastrointestinal: Negative for blood in stool and vomiting.   Genitourinary: Negative for hematuria.   Musculoskeletal: Negative for falls.   Skin: Negative for itching.   Neurological: Negative for speech change, focal weakness, seizures and loss of consciousness.   Endo/Heme/Allergies: Negative for polydipsia.   Psychiatric/Behavioral: Negative for memory loss.       Objective:     Vital Signs (Most Recent):  Temp: 99.9 °F (37.7 °C) (12/01/18 1125)  Pulse: 78 (12/01/18 1125)  Resp: 14 (12/01/18 1125)  BP: (!) 108/55 (12/01/18 1125)  SpO2: 97 % (12/01/18 1125) Vital Signs (24h Range):  Temp:  [98 °F (36.7 °C)-100.8 °F (38.2 °C)] 99.9 °F (37.7 °C)  Pulse:  [71-88] 78  Resp:  [14-20] 14  SpO2:  [94 %-100 %] 97 %  BP: (108-155)/(51-69) 108/55     Weight: 68.7 kg (151 lb 7.3 oz)  Body mass index is 25.2 kg/m².      Intake/Output Summary (Last 24 hours) at 12/1/2018 1156  Last data filed at 12/1/2018 0600  Gross per 24 hour   Intake 485.83 ml   Output 1500 ml   Net -1014.17 ml       Physical Exam   Constitutional: She is oriented to person, place, and time. She appears well-developed and well-nourished. No distress. Nasal cannula in place.   HENT:   Head: Normocephalic and atraumatic.   Eyes: EOM are normal.   Neck: Neck supple.   Cardiovascular: Normal rate and regular rhythm.   Pulmonary/Chest: Effort normal. No stridor. No respiratory distress. She has rales. She exhibits no tenderness.   Abdominal: Soft. There is no tenderness.   Musculoskeletal: She exhibits edema. She exhibits no deformity.   Trace edema bilateral lower extremity   Neurological: She is alert and oriented to person, place, and time.   Skin: Skin is warm.    Psychiatric: She has a normal mood and affect. Her behavior is normal. Judgment and thought content normal.   Nursing note and vitals reviewed.      Vents:  Oxygen Concentration (%): 28 (12/01/18 0832)    Lines/Drains/Airways     Peripheral Intravenous Line                 Peripheral IV - Single Lumen 12/01/18 0622 Right Forearm less than 1 day                Significant Labs:    CBC/Anemia Profile:  Recent Labs   Lab 11/30/18  0322 12/01/18  0441   WBC 5.23 5.33   HGB 9.2* 9.4*   HCT 27.8* 28.5*    214   MCV 90 91   RDW 14.3 14.0        Chemistries:  Recent Labs   Lab 11/30/18  0321 12/01/18  0441    141  140   K 3.8 3.7  3.8    104  104   CO2 24 23  23   BUN 70* 68*  67*   CREATININE 4.4* 4.1*  4.1*   CALCIUM 9.0 9.0  9.1   ALBUMIN 2.3* 2.3*   PHOS 3.9 4.3       Significant Imaging:    Cardiac catheterization  Lad prox 90% mid 50%   lcx prox 50   rca diffusley diseased 70%   Normal filling pressures   Moderate pulmonary htn.

## 2018-12-01 NOTE — PROGRESS NOTES
PT back from cath lab at this time pt in stable condition bedrest until 1917. PT BP was low upon assessment so Lasix was not administered at this time. IV fluids at 100ml/hr infusing for 6 hours. DSG to right groin CDI with no hematoma. Oxygen in use and pt on monitor. PT denies pain will continue to monitor.

## 2018-12-01 NOTE — PROGRESS NOTES
[facility] -   Nephrology  Progress Note    Patient Name: Avril Monzon  MRN: [id number]  Admission Date: 11/26/2018  Hospital Length of Stay: 5 days  Attending Provider: Suhas Prakash, *   Primary Care Physician: Rose Parks MD  Principal Problem:NSTEMI (non-ST elevated myocardial infarction)    Subjective:     HPI: Avril Monzon is a pleasant 73-year-old  woman with history of hypertension, type 2 diabetes, CKD stage 4, followed in our clinic by Dr. Doe, baseline serum creatinine about 2.5-3, patient also has coronary artery disease, she presents to the emergency room with some chest discomfort and shortness of breath, patient diagnosed with NSTEMI, also acute on chronic kidney disease, serum creatinine at 4 mg/dL, likely due to hemodynamic reasons, we were consulted for acute on chronic renal failure, possible need for left heart catheterization,    Interval History: Pt was seen and examined. No new c/o's, no new events last pm, no CP, no SOB. Discussed with cardiology. Reviewed [facility] results.    Review of patient's allergies indicates:   Allergen Reactions    Codeine Swelling    Darvon [propoxyphene] Swelling     Current Facility-Administered Medications   Medication Frequency    0.9%  NaCl infusion (for blood administration) Q24H PRN    acetaminophen tablet 650 mg Q6H PRN    amLODIPine tablet 5 mg Daily    aspirin EC tablet 81 mg Daily    atorvastatin tablet 80 mg Daily    calcifediol Cs24 30 mcg Daily    clopidogrel tablet 75 mg Daily    dextrose 50% injection 12.5 g PRN    furosemide injection 40 mg Daily    glucagon (human recombinant) injection 1 mg PRN    glucose chewable tablet 16 g PRN    glucose chewable tablet 24 g PRN    hydrALAZINE tablet 25 mg Q8H    insulin aspart U-100 pen 1-10 Units QID (AC + HS) PRN    insulin detemir U-100 pen 15 Units QHS    isosorbide mononitrate 24 hr tablet 60 mg Daily    metoprolol tartrate (LOPRESSOR) tablet 25  mg BID    nitroGLYCERIN SL tablet 0.4 mg Q5 Min PRN    ondansetron injection 4 mg Q6H PRN    pantoprazole EC tablet 40 mg Daily    promethazine (PHENERGAN) 6.25 mg in dextrose 5 % 50 mL IVPB Q6H PRN    sodium bicarbonate tablet 325 mg TID       Objective:     Vital Signs (Most Recent):  Temp: 99.9 °F (37.7 °C) (12/01/18 1125)  Pulse: 78 (12/01/18 1125)  Resp: 14 (12/01/18 1125)  BP: (!) 108/55 (12/01/18 1125)  SpO2: 97 % (12/01/18 1125)  O2 Device (Oxygen Therapy): nasal cannula (12/01/18 1125) Vital Signs (24h Range):  Temp:  [98 °F (36.7 °C)-100.8 °F (38.2 °C)] 99.9 °F (37.7 °C)  Pulse:  [71-88] 78  Resp:  [14-20] 14  SpO2:  [94 %-100 %] 97 %  BP: (108-155)/(51-69) 108/55     Weight: 68.7 kg (151 lb 7.3 oz) (12/01/18 0528)  Body mass index is 25.2 kg/m².  Body surface area is 1.78 meters squared.    I/O last 3 completed shifts:  In: 485.8 [P.O.:240; I.V.:245.8]  Out: 3350 [Urine:3350]    Physical Exam   Constitutional: She is oriented to person, place, and time. She appears well-developed and well-nourished.   Neck: No JVD present.   Cardiovascular: Normal rate, regular rhythm and normal heart sounds. Exam reveals no friction rub.   Pulmonary/Chest: Effort normal and breath sounds normal. No respiratory distress. She has no rales.   Abdominal: Soft. Bowel sounds are normal.   Musculoskeletal: She exhibits no edema.   Neurological: She is alert and oriented to person, place, and time.   Skin: Skin is warm and dry.   Psychiatric: She has a normal mood and affect. Her behavior is normal.   Nursing note and vitals reviewed.      Significant Labs: reviewed  BMP  Lab Results   Component Value Date     12/01/2018     12/01/2018    K 3.7 12/01/2018    K 3.8 12/01/2018     12/01/2018     12/01/2018    CO2 23 12/01/2018    CO2 23 12/01/2018    BUN 68 (H) 12/01/2018    BUN 67 (H) 12/01/2018    CREATININE 4.1 (H) 12/01/2018    CREATININE 4.1 (H) 12/01/2018    CALCIUM 9.0 12/01/2018    CALCIUM 9.1  12/01/2018    ANIONGAP 14 12/01/2018    ANIONGAP 13 12/01/2018    ESTGFRAFRICA 12 (A) 12/01/2018    ESTGFRAFRICA 12 (A) 12/01/2018    EGFRNONAA 10 (A) 12/01/2018    EGFRNONAA 10 (A) 12/01/2018     Lab Results   Component Value Date    WBC 5.33 12/01/2018    HGB 9.4 (L) 12/01/2018    HCT 28.5 (L) 12/01/2018    MCV 91 12/01/2018     12/01/2018         Significant Imaging: reviewed [facility] results: Has 90% proximal LAD, 50% distal LAD, 50% prox, 70% RCA and diffusely diseased    Assessment/Plan:         74 y/o female with advanced CKD presented with NSTEMI:                Chronic kidney disease, stage V     Advanced CKD stage 5.  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  No indications for acute HD    HTN: mild hypotension  Meds reviewed  Will hold amlodipine             * NSTEMI (non-ST elevated myocardial infarction)     Cardiac: CAD, NSTEMI  Worsened by acute decompensation of heart failure  S/p [facility] on 11/30/18  Has extensive 2-3 vessel disease  Has also extensive ascending aorta calcification  Was turned down for CABG  Pt will have angioplasty on Mon              Plans and recommendations:  As discussed above  Hold amlodipine  Refer to vascular for vein mapping and AVF placement for chronic HD (non-urgent)    Angela Hanson MD  Nephrology  [facility] -

## 2018-12-01 NOTE — SUBJECTIVE & OBJECTIVE
Interval History:     Review of Systems   Constitutional: Negative for activity change, appetite change, fatigue and fever.   HENT: Negative for congestion, facial swelling, sore throat, trouble swallowing and voice change.    Eyes: Negative for redness and visual disturbance.   Respiratory: Negative for apnea, cough, chest tightness, shortness of breath and wheezing.    Cardiovascular: Negative for chest pain (improved), palpitations and leg swelling.   Gastrointestinal: Negative for abdominal distention, abdominal pain, blood in stool, constipation, diarrhea, nausea and vomiting.   Genitourinary: Negative for decreased urine volume, difficulty urinating, dysuria, flank pain, frequency, hematuria, pelvic pain and urgency.   Musculoskeletal: Negative for back pain, gait problem and joint swelling.   Skin: Negative for color change and rash.   Neurological: Negative for dizziness, syncope, weakness and headaches.   Hematological: Does not bruise/bleed easily.   Psychiatric/Behavioral: Negative for agitation, behavioral problems and confusion. The patient is not nervous/anxious.      Objective:     Vital Signs (Most Recent):  Temp: 99.9 °F (37.7 °C) (12/01/18 1125)  Pulse: 78 (12/01/18 1125)  Resp: 14 (12/01/18 1125)  BP: (!) 108/55 (12/01/18 1125)  SpO2: 97 % (12/01/18 1125) Vital Signs (24h Range):  Temp:  [98 °F (36.7 °C)-100.8 °F (38.2 °C)] 99.9 °F (37.7 °C)  Pulse:  [71-88] 78  Resp:  [14-20] 14  SpO2:  [94 %-100 %] 97 %  BP: (108-155)/(51-69) 108/55     Weight: 68.7 kg (151 lb 7.3 oz)  Body mass index is 25.2 kg/m².    Intake/Output Summary (Last 24 hours) at 12/1/2018 1551  Last data filed at 12/1/2018 0600  Gross per 24 hour   Intake 470 ml   Output 1200 ml   Net -730 ml      Physical Exam   Constitutional: She is oriented to person, place, and time. She appears well-developed and well-nourished.   Neck: No JVD present.   Cardiovascular: Normal rate, regular rhythm and normal heart sounds. Exam reveals no  friction rub.   Pulmonary/Chest: Effort normal and breath sounds normal. No respiratory distress. She has no rales.   Abdominal: Soft. Bowel sounds are normal.   Musculoskeletal: She exhibits no edema.   Neurological: She is alert and oriented to person, place, and time.   Skin: Skin is warm and dry.   Psychiatric: She has a normal mood and affect. Her behavior is normal.   Nursing note and vitals reviewed.      Significant Labs: All pertinent labs within the past 24 hours have been reviewed.    Significant Imaging: I have reviewed all pertinent imaging results/findings within the past 24 hours.

## 2018-12-01 NOTE — ASSESSMENT & PLAN NOTE
-Patient who presents with acute decompensated CHF secondary to NSTEMI/MI  -Needs IV diuresis  -Strict I's/O's  -Assess response  -Continue other meds  -No ACEI/ARB given underlying renal function  -Nephrology on board    11/28/18  -Clinically improved  -Lasix decreased to once daily given borderline BP  -Continue other meds  -No ACEI/ARB given renal function  -Appreciate nephrology assistance    11/29/18  -Improving  -Continue additional day of IV diuresis  -No ACEI/ARB given renal function  -Nephrology on board    12/1/18  -Stable  -Continue same meds

## 2018-12-01 NOTE — PROGRESS NOTES
Noncontrast CT scan of the chest was obtained to evaluate ascending aortic calcification that was seen on cardiac catheterization.     CT scan shows extensive mild to moderate calcification of the ascending and arch of the aorta.  In the proximal to mid 3rd of the ascending aorta, the calcification appears to be circumferential(CT scan was reviewed with radiologist.)   In light of this findings, it is my opinion that a surgical approach to address the patient's coronary artery disease will be associated an unacceptably high risk of morbidity and mortality.

## 2018-12-01 NOTE — PROGRESS NOTES
[facility]  Pulmonology  Progress Note    Patient Name: Avril Monzon  MRN: [id number]  Admission Date: 11/26/2018  Hospital Length of Stay: 5 days  Code Status: Full Code  Attending Provider: Suhas Prakash, *  Primary Care Provider: Rose Parks MD   Principal Problem: NSTEMI (non-ST elevated myocardial infarction)    Subjective:     Interval History:   Review of Systems   Constitutional: Negative for chills and fever.   HENT: Negative for hearing loss and nosebleeds.    Eyes: Negative for discharge.   Respiratory: Positive for shortness of breath. Negative for hemoptysis.    Cardiovascular: Negative for chest pain.   Gastrointestinal: Negative for blood in stool and vomiting.   Genitourinary: Negative for hematuria.   Musculoskeletal: Negative for falls.   Skin: Negative for itching.   Neurological: Negative for speech change, focal weakness, seizures and loss of consciousness.   Endo/Heme/Allergies: Negative for polydipsia.   Psychiatric/Behavioral: Negative for memory loss.       Objective:     Vital Signs (Most Recent):  Temp: 99.9 °F (37.7 °C) (12/01/18 1125)  Pulse: 78 (12/01/18 1125)  Resp: 14 (12/01/18 1125)  BP: (!) 108/55 (12/01/18 1125)  SpO2: 97 % (12/01/18 1125) Vital Signs (24h Range):  Temp:  [98 °F (36.7 °C)-100.8 °F (38.2 °C)] 99.9 °F (37.7 °C)  Pulse:  [71-88] 78  Resp:  [14-20] 14  SpO2:  [94 %-100 %] 97 %  BP: (108-155)/(51-69) 108/55     Weight: 68.7 kg (151 lb 7.3 oz)  Body mass index is 25.2 kg/m².      Intake/Output Summary (Last 24 hours) at 12/1/2018 1156  Last data filed at 12/1/2018 0600  Gross per 24 hour   Intake 485.83 ml   Output 1500 ml   Net -1014.17 ml       Physical Exam   Constitutional: She is oriented to person, place, and time. She appears well-developed and well-nourished. No distress. Nasal cannula in place.   HENT:   Head: Normocephalic and atraumatic.   Eyes: EOM are normal.   Neck: Neck supple.   Cardiovascular: Normal rate and regular  rhythm.   Pulmonary/Chest: Effort normal. No stridor. No respiratory distress. She has rales. She exhibits no tenderness.   Abdominal: Soft. There is no tenderness.   Musculoskeletal: She exhibits edema. She exhibits no deformity.   Trace edema bilateral lower extremity   Neurological: She is alert and oriented to person, place, and time.   Skin: Skin is warm.   Psychiatric: She has a normal mood and affect. Her behavior is normal. Judgment and thought content normal.   Nursing note and vitals reviewed.      Vents:  Oxygen Concentration (%): 28 (12/01/18 0832)    Lines/Drains/Airways     Peripheral Intravenous Line                 Peripheral IV - Single Lumen 12/01/18 0622 Right Forearm less than 1 day                Significant Labs:    CBC/Anemia Profile:  Recent Labs   Lab 11/30/18  0322 12/01/18  0441   WBC 5.23 5.33   HGB 9.2* 9.4*   HCT 27.8* 28.5*    214   MCV 90 91   RDW 14.3 14.0        Chemistries:  Recent Labs   Lab 11/30/18  0321 12/01/18  0441    141  140   K 3.8 3.7  3.8    104  104   CO2 24 23  23   BUN 70* 68*  67*   CREATININE 4.4* 4.1*  4.1*   CALCIUM 9.0 9.0  9.1   ALBUMIN 2.3* 2.3*   PHOS 3.9 4.3       Significant Imaging:    Cardiac catheterization  Lad prox 90% mid 50%   lcx prox 50   rca diffusley diseased 70%   Normal filling pressures   Moderate pulmonary htn.          Assessment/Plan:     * NSTEMI (non-ST elevated myocardial infarction)    Cardiac monitoring  IV heparin, aspirin , statin , beta-blocker, cardiology follow-up  11/28 same.  Monitor PTT  11/29 IV heparin, Monitor PTT. aspirin , statin , beta-blocker, C   12/1 aspirin statin Plavix beta-blocker.  LAD stent hysterectomy.  High-risk for cardiac surgery.     GREGG on CKD, stage IV    P.o. bicarb.  Strict I&Os.  Monitor creatinine bicarb level.  11/28 continue p.o. bicarb.  Strict I&Os.  Monitor BMP  11/29 stable creatinine , aware of risks and possible HD need   12/1 stable creatinine.     Acute hypoxemic  respiratory failure secondary to acute decompensated HF    Strict I&Os, IV Lasix 80 mg q.12 hours.  Repeat chest x-ray in a.m.  Vapotherm keep O2 sat above 89%, BiPAP p.r.n. Distress  11/28 wean O2 , BiPAP p.r.n., continue IV Lasix 80 mg q.12 hours.  Strict I&Os.  11/28 repeat CXR , BNP  , remaining on 5 liters O2   12/1 oxygen requirement improving.  97% O2 sat on 2 L oxygen.  Decrease Lasix to 40 mg q.12 hours IV.     Controlled type 2 diabetes mellitus with diabetic polyneuropathy, with long-term current use of insulin    Fingerstick q.6 hours.  SSI.  12/1 same            Hattie Stephens MD  Pulmonology  [facility]

## 2018-12-01 NOTE — SUBJECTIVE & OBJECTIVE
Interval History: Pt was seen and examined. No new c/o's, no new events last pm, no CP, no SOB. Discussed with cardiology. Reviewed [facility] results.    Review of patient's allergies indicates:   Allergen Reactions    Codeine Swelling    Darvon [propoxyphene] Swelling     Current Facility-Administered Medications   Medication Frequency    0.9%  NaCl infusion (for blood administration) Q24H PRN    acetaminophen tablet 650 mg Q6H PRN    amLODIPine tablet 5 mg Daily    aspirin EC tablet 81 mg Daily    atorvastatin tablet 80 mg Daily    calcifediol Cs24 30 mcg Daily    clopidogrel tablet 75 mg Daily    dextrose 50% injection 12.5 g PRN    furosemide injection 40 mg Daily    glucagon (human recombinant) injection 1 mg PRN    glucose chewable tablet 16 g PRN    glucose chewable tablet 24 g PRN    hydrALAZINE tablet 25 mg Q8H    insulin aspart U-100 pen 1-10 Units QID (AC + HS) PRN    insulin detemir U-100 pen 15 Units QHS    isosorbide mononitrate 24 hr tablet 60 mg Daily    metoprolol tartrate (LOPRESSOR) tablet 25 mg BID    nitroGLYCERIN SL tablet 0.4 mg Q5 Min PRN    ondansetron injection 4 mg Q6H PRN    pantoprazole EC tablet 40 mg Daily    promethazine (PHENERGAN) 6.25 mg in dextrose 5 % 50 mL IVPB Q6H PRN    sodium bicarbonate tablet 325 mg TID       Objective:     Vital Signs (Most Recent):  Temp: 99.9 °F (37.7 °C) (12/01/18 1125)  Pulse: 78 (12/01/18 1125)  Resp: 14 (12/01/18 1125)  BP: (!) 108/55 (12/01/18 1125)  SpO2: 97 % (12/01/18 1125)  O2 Device (Oxygen Therapy): nasal cannula (12/01/18 1125) Vital Signs (24h Range):  Temp:  [98 °F (36.7 °C)-100.8 °F (38.2 °C)] 99.9 °F (37.7 °C)  Pulse:  [71-88] 78  Resp:  [14-20] 14  SpO2:  [94 %-100 %] 97 %  BP: (108-155)/(51-69) 108/55     Weight: 68.7 kg (151 lb 7.3 oz) (12/01/18 0528)  Body mass index is 25.2 kg/m².  Body surface area is 1.78 meters squared.    I/O last 3 completed shifts:  In: 485.8 [P.O.:240; I.V.:245.8]  Out: 1990  [Urine:3350]    Physical Exam   Constitutional: She is oriented to person, place, and time. She appears well-developed and well-nourished.   Neck: No JVD present.   Cardiovascular: Normal rate, regular rhythm and normal heart sounds. Exam reveals no friction rub.   Pulmonary/Chest: Effort normal and breath sounds normal. No respiratory distress. She has no rales.   Abdominal: Soft. Bowel sounds are normal.   Musculoskeletal: She exhibits no edema.   Neurological: She is alert and oriented to person, place, and time.   Skin: Skin is warm and dry.   Psychiatric: She has a normal mood and affect. Her behavior is normal.   Nursing note and vitals reviewed.      Significant Labs: reviewed  [facility]  Lab Results   Component Value Date     12/01/2018     12/01/2018    K 3.7 12/01/2018    K 3.8 12/01/2018     12/01/2018     12/01/2018    CO2 23 12/01/2018    CO2 23 12/01/2018    BUN 68 (H) 12/01/2018    BUN 67 (H) 12/01/2018    CREATININE 4.1 (H) 12/01/2018    CREATININE 4.1 (H) 12/01/2018    CALCIUM 9.0 12/01/2018    CALCIUM 9.1 12/01/2018    ANIONGAP 14 12/01/2018    ANIONGAP 13 12/01/2018    ESTGFRAFRICA 12 (A) 12/01/2018    ESTGFRAFRICA 12 (A) 12/01/2018    EGFRNONAA 10 (A) 12/01/2018    EGFRNONAA 10 (A) 12/01/2018     Lab Results   Component Value Date    WBC 5.33 12/01/2018    HGB 9.4 (L) 12/01/2018    HCT 28.5 (L) 12/01/2018    MCV 91 12/01/2018     12/01/2018         Significant Imaging: reviewed [facility] results: Has 90% proximal LAD, 50% distal LAD, 50% prox, 70% RCA and diffusely diseased

## 2018-12-01 NOTE — ASSESSMENT & PLAN NOTE
-Patient who presented in fulminant pulmonary edema/decompensated CHF in setting of MI  -Troponin 4.417>15.866>16.308  -Needs IV diuresis/optimization of medical therapy  -Continue ASA, amlodipine, statin, hydralazine, lopressor  -Add oral nitrates  -Continue heparin gtt for anticoagulation  -Will need R/LHC once optimized. Patient is at high risk of contrast-induced nephropathy/transition to dialysis. All risks, benefits, and treatment alternatives explained to patient in detail.  -Nephrology on board, appreciate assistance     11/28/18  -Stable overnight  -Troponin trending down  -Volume status improved  -No chest pain  -Continue ASA, amlodipine, statin, hydralazine, BB, imdur  -+Temp noted; Will plan on L/RHC for further evaluation once afebrile and compensated  -NPO after MN    11/29/18  -Improving  -Would benefit from additional day of IV diuresis  -Chest pain free  -Afebrile overnight  -Continue ASA, amlodipine, statin, hydralazine, BB, Imdur  -D/C heparin gtt, start Plavix 75 mg daily  -NPO after MN  -LHC tmw by Dr. Holman. All risks, benefits, and treatment alternatives explained in detail. All questions answered. Patient is aware she is at high risk of contrast induced nephropathy and will need dialysis.     12/1/18  -s/p LHC yesterday by Dr. oHlman that showed 90% proximal LAD stenosis, 50% proximal LCX stenosis, and diffusely diseased RCA  -Turned down for CABG due to severe calcification and high risk of stroke/morbidity/mortality  -Will proceed with repeat LHC with intervention of LAD on Monday  -Continue ASA, Plavix, amlodipine, BB, hydralazine, Imdur

## 2018-12-01 NOTE — PLAN OF CARE
Problem: Patient Care Overview  Goal: Plan of Care Review  Outcome: Ongoing (interventions implemented as appropriate)  POC reviewed with patient, verbalized understanding. Pt remains free from falls. Fall precautions in place. NS on cardiac monitor. VSS. No other complaints at this time. Call bell w/in reach. Reminded to call for assistance. PT had [facility] today and did well. Possible surgery after she is evaluated by cervicothoracic surgeon. PT on bedrest and DSG CDI no hematoma. Oxygen at 2LNC , PIV intact and NS a@ 100 ml hr infusing until midnight. BG being monitored and treated with SSI

## 2018-12-01 NOTE — PLAN OF CARE
Problem: Patient Care Overview  Goal: Plan of Care Review  Outcome: Ongoing (interventions implemented as appropriate)  POC reviewed with patient. Pt verbalized understanding  Pt remains free of injuries and falls; fall precaution in place.   NR on tele monitor.   VSS.  Oxygen at 3 L NC.  IV saline locked; intact.  No other c/o at this time.  Bed low, side rails x2, call light in reach, personal belongings at bedside.  Reminded to call for assistance.  Repositioned independently.  Hourly rounding complete. Will continue to monitor.

## 2018-12-01 NOTE — ASSESSMENT & PLAN NOTE
72 y/o female with advanced CKD presented with NSTEMI:                Chronic kidney disease, stage V     Advanced CKD stage 5.  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  This pt will likely need HD in the long term any way, so LHC will likely not make a significant difference for the kidney, but could potentially help the heart.     K normal  Acid base stable  HTN: BP controlled.             * NSTEMI (non-ST elevated myocardial infarction)     Cardiac: CAD, NSTEMI  Worsened by acute decompensation of heart failure  Responding well to diuretics.  LHC planned for today  Pt understands the risks and agrees.

## 2018-12-01 NOTE — PROGRESS NOTES
"[facility]  Cardiology  Progress Note    Patient Name: Avril Monzon  MRN: [id number]  Admission Date: 11/26/2018  Hospital Length of Stay: 5 days  Code Status: Full Code   Attending Physician: Suhas Prakash, *   Primary Care Physician: Rose Parks MD  Expected Discharge Date:   Principal Problem:NSTEMI (non-ST elevated myocardial infarction)    Subjective:   HPI:  Ms. Monzon is a 73 year old female patient with a PMHx of HTN, hyperlipidemia, DM type II, former tobacco abuse, CKD, anemia, and CVA who presented to [facility] ED last night with a chief complaint of progressively worsening SOB over the past 2-3 days. Patient stated she began to feel like she was coming down with a "cold or sinus infection" on Friday and developed a persistent cough when lying down. Associated symptoms included fatigue and subjective fever. Patient denied any associated ministerio chest pain, palpitations, weight gain, nausea, vomiting, diaphoresis, near syncope, or syncope. Initial workup in ED revealed creatinine of 4.1, BNP of 1613, troponin of 4.4, and lactic acid of 2.7. CXR showed evidence of fluid overload. EKG showed inferior ST depression and patient subsequently admitted for further evaluation and treatment. Cardiology consulted to assist with management. Patient seen and examined today while in ED. States SOB is improving. Remains chest pain free. Denies any prior cardiac history-no known CAD or history of MI. Patient reports compliance with her medications. Chart reviewed. Troponin 4417>15.866>16.308. Repeat EKG this AM showed diffuse ST depression in anterior leads. 2D echo showed EF of 45-50%, +WMA, pulmonary HTN, and mild to moderate MR.    Hospital Course:   11/28/18-Patient seen and examined today, sitting up in bed. Feels better. SOB improving. Remains chest pain free. Diuresed well. Temp of 101 noted overnight. Labs reviewed. Creatinine 4.2.     11/29/18-Patient seen and examined today. Still " mildly SOB. No chest pain. Afebrile overnight. Creatinine 4.3.    12/1/18-Patient seen and examined today, s/p C yesterday that showed 90% proximal LAD stenosis, 50% proximal LCX stenosis, and diffuse diseased RCA. Feels well. No complaints. Denies chest pain or SOB. Labs reviewed. Creatinine stable at 4.1. CVT consulted, not amenable to CABG due to severe calcification and high risk of morbidity and mortality.           Review of Systems   Constitution: Negative.   HENT: Negative.    Eyes: Negative.    Cardiovascular: Negative.    Respiratory: Negative.    Endocrine: Negative.    Hematologic/Lymphatic: Negative.    Skin: Negative.    Musculoskeletal: Negative.    Gastrointestinal: Negative.    Genitourinary: Negative.    Neurological: Negative.    Psychiatric/Behavioral: Negative.    Allergic/Immunologic: Negative.      Objective:     Vital Signs (Most Recent):  Temp: 99.9 °F (37.7 °C) (12/01/18 1125)  Pulse: 78 (12/01/18 1125)  Resp: 14 (12/01/18 1125)  BP: (!) 108/55 (12/01/18 1125)  SpO2: 97 % (12/01/18 1125) Vital Signs (24h Range):  Temp:  [98 °F (36.7 °C)-100.8 °F (38.2 °C)] 99.9 °F (37.7 °C)  Pulse:  [71-88] 78  Resp:  [14-20] 14  SpO2:  [94 %-100 %] 97 %  BP: (108-155)/(51-69) 108/55     Weight: 68.7 kg (151 lb 7.3 oz)  Body mass index is 25.2 kg/m².     SpO2: 97 %  O2 Device (Oxygen Therapy): nasal cannula      Intake/Output Summary (Last 24 hours) at 12/1/2018 1204  Last data filed at 12/1/2018 0600  Gross per 24 hour   Intake 485.83 ml   Output 1500 ml   Net -1014.17 ml       Lines/Drains/Airways     Peripheral Intravenous Line                 Peripheral IV - Single Lumen 12/01/18 0622 Right Forearm less than 1 day                Physical Exam   Constitutional: She is oriented to person, place, and time. She appears well-developed and well-nourished. No distress.   HENT:   Head: Normocephalic and atraumatic.   Eyes: Pupils are equal, round, and reactive to light. Right eye exhibits no discharge. Left  eye exhibits no discharge.   Neck: Neck supple. No JVD present. No thyromegaly present.   Cardiovascular: Normal rate, regular rhythm, S1 normal and S2 normal.   No murmur heard.  Pulmonary/Chest: Effort normal and breath sounds normal. No respiratory distress. She has no wheezes. She has no rales.   Abdominal: Soft. She exhibits no distension. There is no tenderness. There is no rebound.   Musculoskeletal: She exhibits no edema.   Neurological: She is alert and oriented to person, place, and time.   Skin: Skin is warm and dry. She is not diaphoretic. No erythema.   Groin access site C/D/I; no bleeding erythema or drainage   Psychiatric: She has a normal mood and affect. Her behavior is normal. Thought content normal.   Nursing note and vitals reviewed.      Significant Labs:   CMP   Recent Labs   Lab 11/30/18  0321 12/01/18  0441    141  140   K 3.8 3.7  3.8    104  104   CO2 24 23  23   * 68*  68*   BUN 70* 68*  67*   CREATININE 4.4* 4.1*  4.1*   CALCIUM 9.0 9.0  9.1   ALBUMIN 2.3* 2.3*   ANIONGAP 13 14  13   ESTGFRAFRICA 11* 12*  12*   EGFRNONAA 9* 10*  10*   , CBC   Recent Labs   Lab 11/30/18  0322 12/01/18  0441   WBC 5.23 5.33   HGB 9.2* 9.4*   HCT 27.8* 28.5*    214   , Troponin No results for input(s): TROPONINI in the last 48 hours. and All pertinent lab results from the last 24 hours have been reviewed.    Significant Imaging: Echocardiogram:   2D echo with color flow doppler:   Results for orders placed or performed during the hospital encounter of 11/26/18   2D echo with color flow doppler   Result Value Ref Range    QEF 45 55 - 65    Mitral Valve Regurgitation MILD TO MODERATE     Diastolic Dysfunction Yes (A)     Est. PA Systolic Pressure 69.15 (A)     Tricuspid Valve Regurgitation MILD     and X-Ray: CXR: X-Ray Chest 1 View (CXR): No results found for this visit on 11/26/18. and X-Ray Chest PA and Lateral (CXR): No results found for this visit on  11/26/18.    Assessment and Plan:   Patient who presents with acute CHF in setting of NSTEMI, s/p LHC that showed multivessel disease. Turned down for CABG. Will continue optimal medical therapy and plan for repeat LHC on Monday with intervention of LAD. Patient is aware of high risk of contrast-induced nephropathy.    * NSTEMI (non-ST elevated myocardial infarction)    -Patient who presented in fulminant pulmonary edema/decompensated CHF in setting of MI  -Troponin 4.417>15.866>16.308  -Needs IV diuresis/optimization of medical therapy  -Continue ASA, amlodipine, statin, hydralazine, lopressor  -Add oral nitrates  -Continue heparin gtt for anticoagulation  -Will need R/LHC once optimized. Patient is at high risk of contrast-induced nephropathy/transition to dialysis. All risks, benefits, and treatment alternatives explained to patient in detail.  -Nephrology on board, appreciate assistance     11/28/18  -Stable overnight  -Troponin trending down  -Volume status improved  -No chest pain  -Continue ASA, amlodipine, statin, hydralazine, BB, imdur  -+Temp noted; Will plan on L/RHC for further evaluation once afebrile and compensated  -NPO after MN    11/29/18  -Improving  -Would benefit from additional day of IV diuresis  -Chest pain free  -Afebrile overnight  -Continue ASA, amlodipine, statin, hydralazine, BB, Imdur  -D/C heparin gtt, start Plavix 75 mg daily  -NPO after MN  -LHC tmw by Dr. Holman. All risks, benefits, and treatment alternatives explained in detail. All questions answered. Patient is aware she is at high risk of contrast induced nephropathy and will need dialysis.     12/1/18  -s/p LHC yesterday by Dr. Holman that showed 90% proximal LAD stenosis, 50% proximal LCX stenosis, and diffusely diseased RCA  -Turned down for CABG due to severe calcification and high risk of stroke/morbidity/mortality  -Will proceed with repeat LHC with intervention of LAD on Monday  -Continue ASA, Plavix, amlodipine, BB,  hydralazine, Imdur       GREGG on CKD, stage IV    -Creatinine stable at 4.1  -Nephrology on board, appreciate assistance     Hyperlipidemia    -Continue statin       Essential hypertension    -Continue amlodipine, Lopressor, hydralazine, imdur       Acute hypoxemic respiratory failure secondary to acute decompensated HF    -Patient who presents with acute decompensated CHF secondary to NSTEMI/MI  -Needs IV diuresis  -Strict I's/O's  -Assess response  -Continue other meds  -No ACEI/ARB given underlying renal function  -Nephrology on board    11/28/18  -Clinically improved  -Lasix decreased to once daily given borderline BP  -Continue other meds  -No ACEI/ARB given renal function  -Appreciate nephrology assistance    11/29/18  -Improving  -Continue additional day of IV diuresis  -No ACEI/ARB given renal function  -Nephrology on board    12/1/18  -Stable  -Continue same meds         VTE Risk Mitigation (From admission, onward)        Ordered     IP VTE HIGH RISK PATIENT  Once      11/27/18 1342     Place sequential compression device  Until discontinued      11/27/18 1342          Moriah Tran PA-C  Cardiology  [facility]    Chart reviewed. Dr. Holman examined patient and agrees with plan as outlined above.

## 2018-12-02 LAB
ABO + RH BLD: NORMAL
ALBUMIN SERPL BCP-MCNC: 2.2 G/DL
ANION GAP SERPL CALC-SCNC: 13 MMOL/L
BACTERIA BLD CULT: NORMAL
BACTERIA BLD CULT: NORMAL
BASOPHILS # BLD AUTO: 0 K/UL
BASOPHILS NFR BLD: 0 %
BLD GP AB SCN CELLS X3 SERPL QL: NORMAL
BLD PROD TYP BPU: NORMAL
BLOOD UNIT EXPIRATION DATE: NORMAL
BLOOD UNIT TYPE CODE: 6200
BLOOD UNIT TYPE: NORMAL
BUN SERPL-MCNC: 74 MG/DL
CALCIUM SERPL-MCNC: 8.8 MG/DL
CHLORIDE SERPL-SCNC: 104 MMOL/L
CO2 SERPL-SCNC: 23 MMOL/L
CODING SYSTEM: NORMAL
CREAT SERPL-MCNC: 4.9 MG/DL
DIFFERENTIAL METHOD: ABNORMAL
DISPENSE STATUS: NORMAL
EOSINOPHIL # BLD AUTO: 0.1 K/UL
EOSINOPHIL NFR BLD: 1.8 %
ERYTHROCYTE [DISTWIDTH] IN BLOOD BY AUTOMATED COUNT: 12.9 %
EST. GFR  (AFRICAN AMERICAN): 10 ML/MIN/1.73 M^2
EST. GFR  (NON AFRICAN AMERICAN): 8 ML/MIN/1.73 M^2
GLUCOSE SERPL-MCNC: 113 MG/DL
HCT VFR BLD AUTO: 27 %
HGB BLD-MCNC: 8.6 G/DL
LYMPHOCYTES # BLD AUTO: 0.7 K/UL
LYMPHOCYTES NFR BLD: 16.8 %
MCH RBC QN AUTO: 29.9 PG
MCHC RBC AUTO-ENTMCNC: 31.9 G/DL
MCV RBC AUTO: 94 FL
MONOCYTES # BLD AUTO: 0.6 K/UL
MONOCYTES NFR BLD: 14.7 %
NEUTROPHILS # BLD AUTO: 2.9 K/UL
NEUTROPHILS NFR BLD: 66.7 %
NUM UNITS TRANS PACKED RBC: NORMAL
PHOSPHATE SERPL-MCNC: 4.6 MG/DL
PLATELET # BLD AUTO: 203 K/UL
PMV BLD AUTO: 9.9 FL
POCT GLUCOSE: 166 MG/DL (ref 70–110)
POCT GLUCOSE: 211 MG/DL (ref 70–110)
POCT GLUCOSE: 251 MG/DL (ref 70–110)
POCT GLUCOSE: 89 MG/DL (ref 70–110)
POTASSIUM SERPL-SCNC: 3.9 MMOL/L
RBC # BLD AUTO: 2.88 M/UL
SODIUM SERPL-SCNC: 140 MMOL/L
WBC # BLD AUTO: 4.34 K/UL

## 2018-12-02 PROCEDURE — 25000003 PHARM REV CODE 250: Performed by: INTERNAL MEDICINE

## 2018-12-02 PROCEDURE — 21400001 HC TELEMETRY ROOM

## 2018-12-02 PROCEDURE — 94761 N-INVAS EAR/PLS OXIMETRY MLT: CPT

## 2018-12-02 PROCEDURE — 80069 RENAL FUNCTION PANEL: CPT

## 2018-12-02 PROCEDURE — 25000003 PHARM REV CODE 250: Performed by: EMERGENCY MEDICINE

## 2018-12-02 PROCEDURE — P9016 RBC LEUKOCYTES REDUCED: HCPCS

## 2018-12-02 PROCEDURE — 99233 SBSQ HOSP IP/OBS HIGH 50: CPT | Mod: ,,, | Performed by: INTERNAL MEDICINE

## 2018-12-02 PROCEDURE — 94760 N-INVAS EAR/PLS OXIMETRY 1: CPT

## 2018-12-02 PROCEDURE — 86920 COMPATIBILITY TEST SPIN: CPT

## 2018-12-02 PROCEDURE — 85025 COMPLETE CBC W/AUTO DIFF WBC: CPT

## 2018-12-02 PROCEDURE — 25000003 PHARM REV CODE 250: Performed by: PHYSICIAN ASSISTANT

## 2018-12-02 PROCEDURE — 36415 COLL VENOUS BLD VENIPUNCTURE: CPT

## 2018-12-02 PROCEDURE — 25000003 PHARM REV CODE 250: Performed by: NURSE PRACTITIONER

## 2018-12-02 PROCEDURE — 86901 BLOOD TYPING SEROLOGIC RH(D): CPT

## 2018-12-02 RX ORDER — GLYCERIN 1 G/1
1 SUPPOSITORY RECTAL ONCE
Status: COMPLETED | OUTPATIENT
Start: 2018-12-02 | End: 2018-12-02

## 2018-12-02 RX ORDER — SODIUM CHLORIDE 9 MG/ML
INJECTION, SOLUTION INTRAVENOUS CONTINUOUS
Status: DISCONTINUED | OUTPATIENT
Start: 2018-12-03 | End: 2018-12-04

## 2018-12-02 RX ORDER — HYDROCODONE BITARTRATE AND ACETAMINOPHEN 500; 5 MG/1; MG/1
TABLET ORAL
Status: DISCONTINUED | OUTPATIENT
Start: 2018-12-02 | End: 2018-12-04

## 2018-12-02 RX ORDER — LACTULOSE 10 G/15ML
15 SOLUTION ORAL 3 TIMES DAILY
Status: DISCONTINUED | OUTPATIENT
Start: 2018-12-02 | End: 2018-12-04 | Stop reason: HOSPADM

## 2018-12-02 RX ADMIN — INSULIN ASPART 6 UNITS: 100 INJECTION, SOLUTION INTRAVENOUS; SUBCUTANEOUS at 04:12

## 2018-12-02 RX ADMIN — LACTULOSE 15 G: 20 SOLUTION ORAL at 09:12

## 2018-12-02 RX ADMIN — SODIUM BICARBONATE TAB 325 MG 325 MG: 325 TAB at 09:12

## 2018-12-02 RX ADMIN — HYDRALAZINE HYDROCHLORIDE 25 MG: 25 TABLET, FILM COATED ORAL at 05:12

## 2018-12-02 RX ADMIN — GLYCERIN 1 SUPPOSITORY: 2.1 SUPPOSITORY RECTAL at 09:12

## 2018-12-02 RX ADMIN — ASPIRIN 81 MG: 81 TABLET, COATED ORAL at 10:12

## 2018-12-02 RX ADMIN — CLOPIDOGREL BISULFATE 75 MG: 75 TABLET ORAL at 10:12

## 2018-12-02 RX ADMIN — INSULIN ASPART 2 UNITS: 100 INJECTION, SOLUTION INTRAVENOUS; SUBCUTANEOUS at 09:12

## 2018-12-02 RX ADMIN — METOPROLOL TARTRATE 25 MG: 25 TABLET ORAL at 09:12

## 2018-12-02 RX ADMIN — INSULIN ASPART 2 UNITS: 100 INJECTION, SOLUTION INTRAVENOUS; SUBCUTANEOUS at 11:12

## 2018-12-02 RX ADMIN — ATORVASTATIN CALCIUM 80 MG: 40 TABLET, FILM COATED ORAL at 10:12

## 2018-12-02 RX ADMIN — HYDRALAZINE HYDROCHLORIDE 25 MG: 25 TABLET, FILM COATED ORAL at 09:12

## 2018-12-02 RX ADMIN — SODIUM BICARBONATE TAB 325 MG 325 MG: 325 TAB at 10:12

## 2018-12-02 RX ADMIN — METOPROLOL TARTRATE 25 MG: 25 TABLET ORAL at 10:12

## 2018-12-02 RX ADMIN — SODIUM BICARBONATE TAB 325 MG 325 MG: 325 TAB at 04:12

## 2018-12-02 RX ADMIN — INSULIN DETEMIR 15 UNITS: 100 INJECTION, SOLUTION SUBCUTANEOUS at 09:12

## 2018-12-02 RX ADMIN — ISOSORBIDE MONONITRATE 60 MG: 60 TABLET, EXTENDED RELEASE ORAL at 10:12

## 2018-12-02 RX ADMIN — PANTOPRAZOLE SODIUM 40 MG: 40 TABLET, DELAYED RELEASE ORAL at 10:12

## 2018-12-02 NOTE — PLAN OF CARE
Problem: Patient Care Overview  Goal: Plan of Care Review  Outcome: Ongoing (interventions implemented as appropriate)  Patient continues to be monitored and treated for acute and chronic conditions. The patient remains oriented x 4. Furosemide dosage lowered today due to low BP readings. Patient remained free of falls. Possible heart cath in the a.m.

## 2018-12-02 NOTE — PROGRESS NOTES
[facility] Hospital Medicine  Progress Note    Patient Name: Avril Monzon  MRN: [id number]  Patient Class: IP- Inpatient   Admission Date: 11/26/2018  Length of Stay: 6 days  Attending Physician: Suhas Prakash, *  Primary Care Provider: Rose Parks MD        Subjective:     Principal Problem:NSTEMI (non-ST elevated myocardial infarction)    HPI:  Ms. Monzon is a 74 yo female with a PMHx of HTN, HLD, DM II, CKD stage IV, anemia of CKD, and h/o CVA.  She presented to the ED with c/o SOB that has progressively worsened since yesterday.  Associated nonproductive cough and subjective fever.  No aggravating or alleviating factors.  Denies any CP, palpitations, diaphoresis, N/V, orthopnea, PND, edema, weight gain, ABD pain, diarrhea, dysuria, decreased UOP, hematuria, back pain, HA, AMS, lightheadedness, dizziness, syncope, weakness, numbness, or chills.  Per EMS, patient hypoxic on arrival to scene with O2 sat 80% on RA.  She was given Duoneb x 1 and placed on 4L NC with improved O2 sat of 94%.  Upon arrival to ED, patient's O2 sat 82% on 4L NC.  She was placed on BiPAP with increased O2 sat of 100%.  Work-up in ED resulted cr. 4.1, BUN 69, glucose 336, BNP 1613, troponin 4.417, lactic 2.7 > 1.5.  ABG with pH 7.324, pCO2 31.5, pO2 144, HCO3 16.4, BE -10 on BiPAP.  CXR showed pulmonary vascular congestion, pulmonary edema, and small bilateral pleural effusions.  EKG showed normal sinus rhythm with inferior ST depression, no previous to compare.  ASA 325mg, IV lopressor 5mg, and IV Lasix 120mg given per ED.  Case discussed with Cardiology in ED and IV heparin drip initiated.  Hospital Medicine called for admission.  Currently, patient appears comfortable in NAD.  Reports SOB improved on BiPAP.  Denies any CP.  No h/o CAD.  Denies having stress test in the past.  2D echo in 6/2018 showed LVEF of 60-65%, normal diastolic function, and no significant valvular abnormalities.      Hospital  Course:  Pt admitted to Inpatient status for NSTEMI with Troponin significantly elevated.  Cardiology consulted with Heparin infusion initiated.  EKG positive for ST depression.  Pt placed on BIPAP due to respiratory distress and later weaned to high flow oxygen.  Pulmonology consulted.  Chest xray verified pulmonary congestion with positive response to diuresis noted.  BNP elevated. Pt noted to renal failure with Creatinine of 4 and Nephrology consulted.  Baseline creatinine 2.5-3 noted.  Echo results showed EF 45% with diastolic dysfunction, PAP 69, +WMA, Pulm HTN, and mild to moderate MR.  LHC planned for tomorrow. H/H 8.2/25.3 with 1 unit of PRBCs transfused.  Procalcitonin of 0.66.  Blood culture results pending.  Pt denies acute distress at this time. As of 11/30 R/LHC scheduled today showed Lad prox 90% mid 50% lcx prox 50% rca diffusely diseased 70% normal filling pressures moderate pulmonary htn. Cardiology recommending CABG vs LAD arthrectomy stent. CVT recommend to  Surgery to high risk .     Interval History:     Review of Systems   Constitutional: Negative for activity change, appetite change, fatigue and fever.   HENT: Negative for congestion, facial swelling, sore throat, trouble swallowing and voice change.    Eyes: Negative for redness and visual disturbance.   Respiratory: Negative for apnea, cough, chest tightness, shortness of breath and wheezing.    Cardiovascular: Negative for chest pain, palpitations and leg swelling.   Gastrointestinal: Negative for abdominal distention, abdominal pain, blood in stool, constipation, diarrhea, nausea and vomiting.   Genitourinary: Negative for decreased urine volume, difficulty urinating, dysuria, flank pain, frequency, hematuria, pelvic pain and urgency.   Musculoskeletal: Negative for back pain, gait problem and joint swelling.   Skin: Negative for color change and rash.   Neurological: Negative for dizziness, syncope, weakness and headaches.    Hematological: Does not bruise/bleed easily.   Psychiatric/Behavioral: Negative for agitation, behavioral problems and confusion. The patient is not nervous/anxious.      Objective:     Vital Signs (Most Recent):  Temp: 97.9 °F (36.6 °C) (12/02/18 1024)  Pulse: 65 (12/02/18 1024)  Resp: 18 (12/02/18 1024)  BP: 135/62 (12/02/18 1024)  SpO2: 98 % (12/02/18 1024) Vital Signs (24h Range):  Temp:  [97.9 °F (36.6 °C)-99 °F (37.2 °C)] 97.9 °F (36.6 °C)  Pulse:  [60-69] 65  Resp:  [16-20] 18  SpO2:  [95 %-99 %] 98 %  BP: (122-149)/(59-67) 135/62     Weight: 70.2 kg (154 lb 12.2 oz)  Body mass index is 25.75 kg/m².    Intake/Output Summary (Last 24 hours) at 12/2/2018 1538  Last data filed at 12/2/2018 0532  Gross per 24 hour   Intake 236 ml   Output 400 ml   Net -164 ml      Physical Exam   Constitutional: She is oriented to person, place, and time. She appears well-developed and well-nourished.   Neck: No JVD present.   Cardiovascular: Normal rate, regular rhythm and normal heart sounds. Exam reveals no friction rub.   Pulmonary/Chest: Effort normal and breath sounds normal. No respiratory distress. She has no rales.   Abdominal: Soft. Bowel sounds are normal.   Musculoskeletal: She exhibits no edema.   Neurological: She is alert and oriented to person, place, and time.   Skin: Skin is warm and dry.   Psychiatric: She has a normal mood and affect. Her behavior is normal.   Nursing note and vitals reviewed.      Significant Labs: All pertinent labs within the past 24 hours have been reviewed.    Significant Imaging: I have reviewed all pertinent imaging results/findings within the past 24 hours.    Assessment/Plan:      * NSTEMI (non-ST elevated myocardial infarction)    - Cardiology following  - Serial troponin 4.417>>15.86>>16.308  - Continue ASA, Statin, BB  - 2D ECHO with EF 45-50% with diastolic dysfunction and pulmonary HTN  - [facility] today showed Lad prox 90% mid 50% lcx prox 50% rca diffusely diseased 70% normal  filling pressures moderate pulmonary htn. She is at risk of contrast induced nephropathy post LHC  - Cardiology recommending CABG vs LAD arthrectomy stent  - CVT consulted . Recommend  No surgery due to high risk   -Pt will have  Angiogram on  monday      Stage 5 chronic kidney disease    Nephrology on board       GREGG on CKD, stage IV    - Likely cardiorenal syndrome  - Diuresis continued  - strict I&O's  - Avoid nephrotoxic agents.  Hold home ACEi  - Nephrology following   - Baseline creatinine 2.5-3  - She is at risk of contrast induced nephropathy post LHC  - Vascular consulted to start the process for a AVF      Hyperlipidemia    - Continue Statin     Essential hypertension    - BP stable  - Resume home Hydralazine and Amlodipine  - Continue BB and diuresis as above  - Continue to hold ACEI     Acute hypoxemic respiratory failure secondary to acute decompensated HF    - Cardiology following  - Continue Lasix 80 mg IVP BID  - Strict I&O's, daily weights  - Na/fluid restriction.  - 2D echo results showed EF 45% with diastolic dysfunction, PAP 69, +WMA, and mild to moderate MR.   - Cardiology and Pulmonology following  - Weaned off Vapotherm, currently on nasal cannula       Anemia in stage 4 chronic kidney disease    - Hgb/Hct 9.2/27.8 -- stable   - 1 unit of PRBCs transfused on 11/27/18  - Transfuse 1 PRBC  To keep > 9  Per cardiology recommendation  Before LHC        Controlled type 2 diabetes mellitus with diabetic polyneuropathy, with long-term current use of insulin    - HgbA1c 6.7 -- contolled  - Accuchecks with moderate SSI  - Detemir 15 units SQ nightly           Constipation    Lactulose  Po x 1   glycerine suppository  X 1          VTE Risk Mitigation (From admission, onward)        Ordered     IP VTE HIGH RISK PATIENT  Once      11/27/18 1342     Place sequential compression device  Until discontinued      11/27/18 1342              Suhas Prakash MD  Department of Hospital Medicine   [facility]

## 2018-12-02 NOTE — PROGRESS NOTES
[facility]  Nephrology  Progress Note    Patient Name: Avril Monzon  MRN: [id number]  Admission Date: 11/26/2018  Hospital Length of Stay: 6 days  Attending Provider: Suhas Prakash, *   Primary Care Physician: Rose Parks MD  Principal Problem:NSTEMI (non-ST elevated myocardial infarction)    Subjective:     HPI: Avril Monzon is a pleasant 73-year-old  woman with history of hypertension, type 2 diabetes, CKD stage 4, followed in our clinic by Dr. Doe, baseline serum creatinine about 2.5-3, patient also has coronary artery disease, she presents to the emergency room with some chest discomfort and shortness of breath, patient diagnosed with NSTEMI, also acute on chronic kidney disease, serum creatinine at 4 mg/dL, likely due to hemodynamic reasons, we were consulted for acute on chronic renal failure, possible need for left heart catheterization,    Interval History: Pt was seen and examined. No new events last pm, no CP, no SOB. Disussed with cardiology.    Review of patient's allergies indicates:   Allergen Reactions    Codeine Swelling    Darvon [propoxyphene] Swelling     Current Facility-Administered Medications   Medication Frequency    0.9%  NaCl infusion (for blood administration) Q24H PRN    [START ON 12/3/2018] 0.9%  NaCl infusion Continuous    acetaminophen tablet 650 mg Q6H PRN    aspirin EC tablet 81 mg Daily    atorvastatin tablet 80 mg Daily    calcifediol Cs24 30 mcg Daily    clopidogrel tablet 75 mg Daily    dextrose 50% injection 12.5 g PRN    glucagon (human recombinant) injection 1 mg PRN    glucose chewable tablet 16 g PRN    glucose chewable tablet 24 g PRN    hydrALAZINE tablet 25 mg Q8H    insulin aspart U-100 pen 1-10 Units QID (AC + HS) PRN    insulin detemir U-100 pen 15 Units QHS    isosorbide mononitrate 24 hr tablet 60 mg Daily    metoprolol tartrate (LOPRESSOR) tablet 25 mg BID    nitroGLYCERIN SL tablet 0.4 mg Q5 Min  PRN    ondansetron injection 4 mg Q6H PRN    pantoprazole EC tablet 40 mg Daily    promethazine (PHENERGAN) 6.25 mg in dextrose 5 % 50 mL IVPB Q6H PRN    sodium bicarbonate tablet 325 mg TID       Objective:     Vital Signs (Most Recent):  Temp: 97.9 °F (36.6 °C) (12/02/18 1024)  Pulse: 65 (12/02/18 1024)  Resp: 18 (12/02/18 1024)  BP: 135/62 (12/02/18 1024)  SpO2: 98 % (12/02/18 1024)  O2 Device (Oxygen Therapy): nasal cannula (12/02/18 0827) Vital Signs (24h Range):  Temp:  [97.9 °F (36.6 °C)-99 °F (37.2 °C)] 97.9 °F (36.6 °C)  Pulse:  [60-69] 65  Resp:  [16-20] 18  SpO2:  [95 %-99 %] 98 %  BP: (122-149)/(59-67) 135/62     Weight: 70.2 kg (154 lb 12.2 oz) (12/02/18 0531)  Body mass index is 25.75 kg/m².  Body surface area is 1.79 meters squared.    I/O last 3 completed shifts:  In: 476 [P.O.:476]  Out: 1900 [Urine:1900]    Physical Exam   Constitutional: She is oriented to person, place, and time. She appears well-developed and well-nourished.   Neck: No JVD present.   Cardiovascular: Normal rate, regular rhythm and normal heart sounds. Exam reveals no friction rub.   Pulmonary/Chest: Effort normal and breath sounds normal. No respiratory distress. She has no rales.   Abdominal: Soft. Bowel sounds are normal.   Musculoskeletal: She exhibits no edema.   Neurological: She is alert and oriented to person, place, and time.   Skin: Skin is warm and dry.   Psychiatric: She has a normal mood and affect. Her behavior is normal.   Nursing note and vitals reviewed.      Significant Labs: reviewed  BMP  Lab Results   Component Value Date     12/02/2018    K 3.9 12/02/2018     12/02/2018    CO2 23 12/02/2018    BUN 74 (H) 12/02/2018    CREATININE 4.9 (H) 12/02/2018    CALCIUM 8.8 12/02/2018    ANIONGAP 13 12/02/2018    ESTGFRAFRICA 10 (A) 12/02/2018    EGFRNONAA 8 (A) 12/02/2018     Lab Results   Component Value Date    WBC 4.34 12/02/2018    HGB 8.6 (L) 12/02/2018    HCT 27.0 (L) 12/02/2018    MCV 94  12/02/2018     12/02/2018       Significant Imaging: reviewed    Assessment/Plan:         74 y/o female with advanced CKD presented with NSTEMI:                  Chronic kidney disease, stage V     Advanced CKD stage 5 at baseline  Noted slight worsening in s Cr  Received contrast on 11/30/18  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  No indications for acute HD     HTN: BP controlled, hypotension resolved after stopping amlodipine  Meds reviewed               * NSTEMI (non-ST elevated myocardial infarction)     Cardiac: CAD, NSTEMI  Acute decompensation of heart failure  S/p [facility] on 11/30/18  Has extensive 2-3 vessel disease  Has also extensive ascending aorta calcification  Was turned down for CABG  Pt will have angioplasty on Mon                   Plans and recommendations:  As discussed above  Referrd to vascular for vein mapping and AVF placement for chronic HD (non-urgent)               Angela Hanson MD  Nephrology  [facility]

## 2018-12-02 NOTE — SUBJECTIVE & OBJECTIVE
Interval History:     Review of Systems   Constitutional: Negative for activity change, appetite change, fatigue and fever.   HENT: Negative for congestion, facial swelling, sore throat, trouble swallowing and voice change.    Eyes: Negative for redness and visual disturbance.   Respiratory: Negative for apnea, cough, chest tightness, shortness of breath and wheezing.    Cardiovascular: Negative for chest pain, palpitations and leg swelling.   Gastrointestinal: Negative for abdominal distention, abdominal pain, blood in stool, constipation, diarrhea, nausea and vomiting.   Genitourinary: Negative for decreased urine volume, difficulty urinating, dysuria, flank pain, frequency, hematuria, pelvic pain and urgency.   Musculoskeletal: Negative for back pain, gait problem and joint swelling.   Skin: Negative for color change and rash.   Neurological: Negative for dizziness, syncope, weakness and headaches.   Hematological: Does not bruise/bleed easily.   Psychiatric/Behavioral: Negative for agitation, behavioral problems and confusion. The patient is not nervous/anxious.      Objective:     Vital Signs (Most Recent):  Temp: 97.9 °F (36.6 °C) (12/02/18 1024)  Pulse: 65 (12/02/18 1024)  Resp: 18 (12/02/18 1024)  BP: 135/62 (12/02/18 1024)  SpO2: 98 % (12/02/18 1024) Vital Signs (24h Range):  Temp:  [97.9 °F (36.6 °C)-99 °F (37.2 °C)] 97.9 °F (36.6 °C)  Pulse:  [60-69] 65  Resp:  [16-20] 18  SpO2:  [95 %-99 %] 98 %  BP: (122-149)/(59-67) 135/62     Weight: 70.2 kg (154 lb 12.2 oz)  Body mass index is 25.75 kg/m².    Intake/Output Summary (Last 24 hours) at 12/2/2018 1538  Last data filed at 12/2/2018 0532  Gross per 24 hour   Intake 236 ml   Output 400 ml   Net -164 ml      Physical Exam   Constitutional: She is oriented to person, place, and time. She appears well-developed and well-nourished.   Neck: No JVD present.   Cardiovascular: Normal rate, regular rhythm and normal heart sounds. Exam reveals no friction rub.    Pulmonary/Chest: Effort normal and breath sounds normal. No respiratory distress. She has no rales.   Abdominal: Soft. Bowel sounds are normal.   Musculoskeletal: She exhibits no edema.   Neurological: She is alert and oriented to person, place, and time.   Skin: Skin is warm and dry.   Psychiatric: She has a normal mood and affect. Her behavior is normal.   Nursing note and vitals reviewed.      Significant Labs: All pertinent labs within the past 24 hours have been reviewed.    Significant Imaging: I have reviewed all pertinent imaging results/findings within the past 24 hours.

## 2018-12-02 NOTE — ASSESSMENT & PLAN NOTE
74 y/o female with advanced CKD presented with NSTEMI:                  Chronic kidney disease, stage V     Advanced CKD stage 5.  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  No indications for acute HD     HTN: mild hypotension  Meds reviewed  Will hold amlodipine               * NSTEMI (non-ST elevated myocardial infarction)     Cardiac: CAD, NSTEMI  Worsened by acute decompensation of heart failure  S/p [facility] on 11/30/18  Has extensive 2-3 vessel disease  Has also extensive ascending aorta calcification  Was turned down for CABG  Pt will have angioplasty on Mon                   Plans and recommendations:  As discussed above  Hold amlodipine  Refer to vascular for vein mapping and AVF placement for chronic HD (non-urgent)

## 2018-12-02 NOTE — SUBJECTIVE & OBJECTIVE
Interval History: Pt was seen and examined. No new events last pm, no CP, no SOB. Disussed with cardiology.    Review of patient's allergies indicates:   Allergen Reactions    Codeine Swelling    Darvon [propoxyphene] Swelling     Current Facility-Administered Medications   Medication Frequency    0.9%  NaCl infusion (for blood administration) Q24H PRN    [START ON 12/3/2018] 0.9%  NaCl infusion Continuous    acetaminophen tablet 650 mg Q6H PRN    aspirin EC tablet 81 mg Daily    atorvastatin tablet 80 mg Daily    calcifediol Cs24 30 mcg Daily    clopidogrel tablet 75 mg Daily    dextrose 50% injection 12.5 g PRN    glucagon (human recombinant) injection 1 mg PRN    glucose chewable tablet 16 g PRN    glucose chewable tablet 24 g PRN    hydrALAZINE tablet 25 mg Q8H    insulin aspart U-100 pen 1-10 Units QID (AC + HS) PRN    insulin detemir U-100 pen 15 Units QHS    isosorbide mononitrate 24 hr tablet 60 mg Daily    metoprolol tartrate (LOPRESSOR) tablet 25 mg BID    nitroGLYCERIN SL tablet 0.4 mg Q5 Min PRN    ondansetron injection 4 mg Q6H PRN    pantoprazole EC tablet 40 mg Daily    promethazine (PHENERGAN) 6.25 mg in dextrose 5 % 50 mL IVPB Q6H PRN    sodium bicarbonate tablet 325 mg TID       Objective:     Vital Signs (Most Recent):  Temp: 97.9 °F (36.6 °C) (12/02/18 1024)  Pulse: 65 (12/02/18 1024)  Resp: 18 (12/02/18 1024)  BP: 135/62 (12/02/18 1024)  SpO2: 98 % (12/02/18 1024)  O2 Device (Oxygen Therapy): nasal cannula (12/02/18 0849) Vital Signs (24h Range):  Temp:  [97.9 °F (36.6 °C)-99 °F (37.2 °C)] 97.9 °F (36.6 °C)  Pulse:  [60-69] 65  Resp:  [16-20] 18  SpO2:  [95 %-99 %] 98 %  BP: (122-149)/(59-67) 135/62     Weight: 70.2 kg (154 lb 12.2 oz) (12/02/18 0531)  Body mass index is 25.75 kg/m².  Body surface area is 1.79 meters squared.    I/O last 3 completed shifts:  In: 476 [P.O.:476]  Out: 1900 [Urine:1900]    Physical Exam   Constitutional: She is oriented to person, place, and  time. She appears well-developed and well-nourished.   Neck: No JVD present.   Cardiovascular: Normal rate, regular rhythm and normal heart sounds. Exam reveals no friction rub.   Pulmonary/Chest: Effort normal and breath sounds normal. No respiratory distress. She has no rales.   Abdominal: Soft. Bowel sounds are normal.   Musculoskeletal: She exhibits no edema.   Neurological: She is alert and oriented to person, place, and time.   Skin: Skin is warm and dry.   Psychiatric: She has a normal mood and affect. Her behavior is normal.   Nursing note and vitals reviewed.      Significant Labs: reviewed  BMP  Lab Results   Component Value Date     12/02/2018    K 3.9 12/02/2018     12/02/2018    CO2 23 12/02/2018    BUN 74 (H) 12/02/2018    CREATININE 4.9 (H) 12/02/2018    CALCIUM 8.8 12/02/2018    ANIONGAP 13 12/02/2018    ESTGFRAFRICA 10 (A) 12/02/2018    EGFRNONAA 8 (A) 12/02/2018     Lab Results   Component Value Date    WBC 4.34 12/02/2018    HGB 8.6 (L) 12/02/2018    HCT 27.0 (L) 12/02/2018    MCV 94 12/02/2018     12/02/2018       Significant Imaging: reviewed

## 2018-12-02 NOTE — ASSESSMENT & PLAN NOTE
-H and H 8.6/27.0  -Would benefit from transfusion in anticipation of LHC  -Will discuss with hospital medicine

## 2018-12-02 NOTE — PROGRESS NOTES
"[facility]  Cardiology  Progress Note    Patient Name: Avril Monzon  MRN: [id number]  Admission Date: 11/26/2018  Hospital Length of Stay: 6 days  Code Status: Full Code   Attending Physician: Suhas Prakash, *   Primary Care Physician: Rose Parks MD  Expected Discharge Date:   Principal Problem:NSTEMI (non-ST elevated myocardial infarction)    Subjective:   HPI:  Ms. Monzon is a 73 year old female patient with a PMHx of HTN, hyperlipidemia, DM type II, former tobacco abuse, CKD, anemia, and CVA who presented to [facility] ED last night with a chief complaint of progressively worsening SOB over the past 2-3 days. Patient stated she began to feel like she was coming down with a "cold or sinus infection" on Friday and developed a persistent cough when lying down. Associated symptoms included fatigue and subjective fever. Patient denied any associated minitserio chest pain, palpitations, weight gain, nausea, vomiting, diaphoresis, near syncope, or syncope. Initial workup in ED revealed creatinine of 4.1, BNP of 1613, troponin of 4.4, and lactic acid of 2.7. CXR showed evidence of fluid overload. EKG showed inferior ST depression and patient subsequently admitted for further evaluation and treatment. Cardiology consulted to assist with management. Patient seen and examined today while in ED. States SOB is improving. Remains chest pain free. Denies any prior cardiac history-no known CAD or history of MI. Patient reports compliance with her medications. Chart reviewed. Troponin 4417>15.866>16.308. Repeat EKG this AM showed diffuse ST depression in anterior leads. 2D echo showed EF of 45-50%, +WMA, pulmonary HTN, and mild to moderate MR.    Hospital Course:   11/28/18-Patient seen and examined today, sitting up in bed. Feels better. SOB improving. Remains chest pain free. Diuresed well. Temp of 101 noted overnight. Labs reviewed. Creatinine 4.2.     11/29/18-Patient seen and examined today. Still " mildly SOB. No chest pain. Afebrile overnight. Creatinine 4.3.    12/1/18-Patient seen and examined today, s/p LHC yesterday that showed 90% proximal LAD stenosis, 50% proximal LCX stenosis, and diffuse diseased RCA. Feels well. No complaints. Denies chest pain or SOB. Labs reviewed. Creatinine stable at 4.1. CVT consulted, not amenable to CABG due to severe calcification and high risk of morbidity and mortality.     12/2/18-Patient seen and examined today, sitting up in bed. Feels well. No cardiac complaints. Denies chest pain or SOB. Labs reviewed. Creatinine 4.9. Repeat [facility] planned for tmw with intervention of LAD.        Review of Systems   Constitution: Positive for malaise/fatigue.   HENT: Negative.    Eyes: Negative.    Cardiovascular: Negative.    Respiratory: Negative.    Endocrine: Negative.    Hematologic/Lymphatic: Negative.    Skin: Negative.    Musculoskeletal: Negative.    Gastrointestinal: Negative.    Genitourinary: Negative.    Neurological: Negative.    Psychiatric/Behavioral: Negative.    Allergic/Immunologic: Negative.      Objective:     Vital Signs (Most Recent):  Temp: 97.9 °F (36.6 °C) (12/02/18 1024)  Pulse: 65 (12/02/18 1024)  Resp: 18 (12/02/18 1024)  BP: 135/62 (12/02/18 1024)  SpO2: 98 % (12/02/18 1024) Vital Signs (24h Range):  Temp:  [97.9 °F (36.6 °C)-99 °F (37.2 °C)] 97.9 °F (36.6 °C)  Pulse:  [60-69] 65  Resp:  [16-20] 18  SpO2:  [95 %-99 %] 98 %  BP: (122-149)/(59-67) 135/62     Weight: 70.2 kg (154 lb 12.2 oz)  Body mass index is 25.75 kg/m².     SpO2: 98 %  O2 Device (Oxygen Therapy): nasal cannula      Intake/Output Summary (Last 24 hours) at 12/2/2018 1149  Last data filed at 12/2/2018 0532  Gross per 24 hour   Intake 236 ml   Output 400 ml   Net -164 ml       Lines/Drains/Airways     Peripheral Intravenous Line                 Peripheral IV - Single Lumen 12/01/18 0622 Right Forearm 1 day                Physical Exam   Constitutional: She is oriented to person, place, and  time. She appears well-developed and well-nourished. No distress.   HENT:   Head: Normocephalic and atraumatic.   Eyes: Pupils are equal, round, and reactive to light. Right eye exhibits no discharge. Left eye exhibits no discharge.   Neck: Neck supple. No JVD present. No thyromegaly present.   Cardiovascular: Normal rate, regular rhythm, S1 normal, S2 normal and normal heart sounds.   No murmur heard.  Pulmonary/Chest: Effort normal and breath sounds normal. No respiratory distress. She has no wheezes. She has no rales.   Diminished BS at bases   Abdominal: Soft. She exhibits no distension. There is no tenderness. There is no rebound.   Musculoskeletal: She exhibits no edema.   Neurological: She is alert and oriented to person, place, and time.   Skin: Skin is warm and dry. She is not diaphoretic. No erythema.   Access site C/D/I; no bleeding erythema or drainage   Psychiatric: She has a normal mood and affect. Her behavior is normal. Thought content normal.   Nursing note and vitals reviewed.      Significant Labs:   CMP   Recent Labs   Lab 12/01/18  0441 12/02/18  0401     140 140   K 3.7  3.8 3.9     104 104   CO2 23  23 23   GLU 68*  68* 113*   BUN 68*  67* 74*   CREATININE 4.1*  4.1* 4.9*   CALCIUM 9.0  9.1 8.8   ALBUMIN 2.3* 2.2*   ANIONGAP 14  13 13   ESTGFRAFRICA 12*  12* 10*   EGFRNONAA 10*  10* 8*   , CBC   Recent Labs   Lab 12/01/18  0441 12/02/18  0401   WBC 5.33 4.34   HGB 9.4* 8.6*   HCT 28.5* 27.0*    203   , Troponin No results for input(s): TROPONINI in the last 48 hours. and All pertinent lab results from the last 24 hours have been reviewed.    Significant Imaging: Echocardiogram:   2D echo with color flow doppler:   Results for orders placed or performed during the hospital encounter of 11/26/18   2D echo with color flow doppler   Result Value Ref Range    QEF 45 55 - 65    Mitral Valve Regurgitation MILD TO MODERATE     Diastolic Dysfunction Yes (A)     Est. PA  Systolic Pressure 69.15 (A)     Tricuspid Valve Regurgitation MILD    , EKG: REviewed and X-Ray: CXR: X-Ray Chest 1 View (CXR): No results found for this visit on 11/26/18. and X-Ray Chest PA and Lateral (CXR): No results found for this visit on 11/26/18.    Assessment and Plan:   Patient who presents with acute CHF in setting of NSTEMI. Repeat LHC planned for tomorrow with intervention of LAD. Continue same meds. Nephrology on board.       * NSTEMI (non-ST elevated myocardial infarction)    -Patient who presented in fulminant pulmonary edema/decompensated CHF in setting of MI  -Troponin 4.417>15.866>16.308  -Needs IV diuresis/optimization of medical therapy  -Continue ASA, amlodipine, statin, hydralazine, lopressor  -Add oral nitrates  -Continue heparin gtt for anticoagulation  -Will need R/LHC once optimized. Patient is at high risk of contrast-induced nephropathy/transition to dialysis. All risks, benefits, and treatment alternatives explained to patient in detail.  -Nephrology on board, appreciate assistance     11/28/18  -Stable overnight  -Troponin trending down  -Volume status improved  -No chest pain  -Continue ASA, amlodipine, statin, hydralazine, BB, imdur  -+Temp noted; Will plan on L/RHC for further evaluation once afebrile and compensated  -NPO after MN    11/29/18  -Improving  -Would benefit from additional day of IV diuresis  -Chest pain free  -Afebrile overnight  -Continue ASA, amlodipine, statin, hydralazine, BB, Imdur  -D/C heparin gtt, start Plavix 75 mg daily  -NPO after MN  -LHC tmw by Dr. Holman. All risks, benefits, and treatment alternatives explained in detail. All questions answered. Patient is aware she is at high risk of contrast induced nephropathy and will need dialysis.     12/1/18  -s/p LHC yesterday by Dr. Holman that showed 90% proximal LAD stenosis, 50% proximal LCX stenosis, and diffusely diseased RCA  -Turned down for CABG due to severe calcification and high risk of  stroke/morbidity/mortality  -Will proceed with repeat [facility] with intervention of LAD on Monday  -Continue ASA, Plavix, amlodipine, BB, hydralazine, Imdur    12/2/18  -Stable overnight  -Repeat C planned for tmw by Dr. Holman with intervention of LAD  -Patient aware of high risk of contrast induced nephropathy  -All risks, benefits, and treatment alternatives explained to patient in detail. All questions answered. She has agreed to proceed.  -NPO after MN; 50 cc/hr gentle fluids beginning at 4 AM  -Continue ASA, Plavix, amlodipine, BB, hydralazine, Imdur     GREGG on CKD, stage IV    -Creatinine 4.9  -Lasix held today  -Nephrology on board, appreciate assistance  -Gentle hydration pre-procedure; patient aware she is at high risk of contrast induced nephropathy/transition to dialysis.      Hyperlipidemia    -Continue statin       Essential hypertension    -Continue amlodipine, Lopressor, hydralazine, imdur       Acute hypoxemic respiratory failure secondary to acute decompensated HF    -Patient who presents with acute decompensated CHF secondary to NSTEMI/MI  -Needs IV diuresis  -Strict I's/O's  -Assess response  -Continue other meds  -No ACEI/ARB given underlying renal function  -Nephrology on board    11/28/18  -Clinically improved  -Lasix decreased to once daily given borderline BP  -Continue other meds  -No ACEI/ARB given renal function  -Appreciate nephrology assistance    11/29/18  -Improving  -Continue additional day of IV diuresis  -No ACEI/ARB given renal function  -Nephrology on board    12/1/18  -Stable  -Continue same meds    12/2/18  -Stable   -Continue same meds     Anemia in stage 4 chronic kidney disease    -H and H 8.6/27.0  -Would benefit from transfusion in anticipation of LHC  -Will discuss with hospital medicine         VTE Risk Mitigation (From admission, onward)        Ordered     IP VTE HIGH RISK PATIENT  Once      11/27/18 1342     Place sequential compression device  Until discontinued       11/27/18 1342          Moriah Tran PA-C  Cardiology  [facility]     Chart reviewed. Dr. Holman examined patient and agrees with plan as outlined above.

## 2018-12-02 NOTE — ASSESSMENT & PLAN NOTE
-Patient who presents with acute decompensated CHF secondary to NSTEMI/MI  -Needs IV diuresis  -Strict I's/O's  -Assess response  -Continue other meds  -No ACEI/ARB given underlying renal function  -Nephrology on board    11/28/18  -Clinically improved  -Lasix decreased to once daily given borderline BP  -Continue other meds  -No ACEI/ARB given renal function  -Appreciate nephrology assistance    11/29/18  -Improving  -Continue additional day of IV diuresis  -No ACEI/ARB given renal function  -Nephrology on board    12/1/18  -Stable  -Continue same meds    12/2/18  -Stable   -Continue same meds

## 2018-12-02 NOTE — PLAN OF CARE
Problem: Patient Care Overview  Goal: Plan of Care Review  Outcome: Ongoing (interventions implemented as appropriate)  POC reviewed with patient. Pt verbalized understanding  Pt remains free of injuries and falls; fall precaution in place.   NR on tele monitor; HR in 60's.  IV saline locked; intact.  Oxygen at 2L NC.  No other c/o at this time.  Bed low, side rails x2, call light in reach, personal belongings at bedside.  Reminded to call for assistance.  Repositioned independently.  Hourly rounding complete. Will continue to monitor.

## 2018-12-02 NOTE — SUBJECTIVE & OBJECTIVE
Review of Systems   Constitution: Positive for malaise/fatigue.   HENT: Negative.    Eyes: Negative.    Cardiovascular: Negative.    Respiratory: Negative.    Endocrine: Negative.    Hematologic/Lymphatic: Negative.    Skin: Negative.    Musculoskeletal: Negative.    Gastrointestinal: Negative.    Genitourinary: Negative.    Neurological: Negative.    Psychiatric/Behavioral: Negative.    Allergic/Immunologic: Negative.      Objective:     Vital Signs (Most Recent):  Temp: 97.9 °F (36.6 °C) (12/02/18 1024)  Pulse: 65 (12/02/18 1024)  Resp: 18 (12/02/18 1024)  BP: 135/62 (12/02/18 1024)  SpO2: 98 % (12/02/18 1024) Vital Signs (24h Range):  Temp:  [97.9 °F (36.6 °C)-99 °F (37.2 °C)] 97.9 °F (36.6 °C)  Pulse:  [60-69] 65  Resp:  [16-20] 18  SpO2:  [95 %-99 %] 98 %  BP: (122-149)/(59-67) 135/62     Weight: 70.2 kg (154 lb 12.2 oz)  Body mass index is 25.75 kg/m².     SpO2: 98 %  O2 Device (Oxygen Therapy): nasal cannula      Intake/Output Summary (Last 24 hours) at 12/2/2018 1149  Last data filed at 12/2/2018 0532  Gross per 24 hour   Intake 236 ml   Output 400 ml   Net -164 ml       Lines/Drains/Airways     Peripheral Intravenous Line                 Peripheral IV - Single Lumen 12/01/18 0622 Right Forearm 1 day                Physical Exam   Constitutional: She is oriented to person, place, and time. She appears well-developed and well-nourished. No distress.   HENT:   Head: Normocephalic and atraumatic.   Eyes: Pupils are equal, round, and reactive to light. Right eye exhibits no discharge. Left eye exhibits no discharge.   Neck: Neck supple. No JVD present. No thyromegaly present.   Cardiovascular: Normal rate, regular rhythm, S1 normal, S2 normal and normal heart sounds.   No murmur heard.  Pulmonary/Chest: Effort normal and breath sounds normal. No respiratory distress. She has no wheezes. She has no rales.   Diminished BS at bases   Abdominal: Soft. She exhibits no distension. There is no tenderness. There is  no rebound.   Musculoskeletal: She exhibits no edema.   Neurological: She is alert and oriented to person, place, and time.   Skin: Skin is warm and dry. She is not diaphoretic. No erythema.   Access site C/D/I; no bleeding erythema or drainage   Psychiatric: She has a normal mood and affect. Her behavior is normal. Thought content normal.   Nursing note and vitals reviewed.      Significant Labs:   CMP   Recent Labs   Lab 12/01/18  0441 12/02/18  0401     140 140   K 3.7  3.8 3.9     104 104   CO2 23  23 23   GLU 68*  68* 113*   BUN 68*  67* 74*   CREATININE 4.1*  4.1* 4.9*   CALCIUM 9.0  9.1 8.8   ALBUMIN 2.3* 2.2*   ANIONGAP 14  13 13   ESTGFRAFRICA 12*  12* 10*   EGFRNONAA 10*  10* 8*   , CBC   Recent Labs   Lab 12/01/18  0441 12/02/18  0401   WBC 5.33 4.34   HGB 9.4* 8.6*   HCT 28.5* 27.0*    203   , Troponin No results for input(s): TROPONINI in the last 48 hours. and All pertinent lab results from the last 24 hours have been reviewed.    Significant Imaging: Echocardiogram:   2D echo with color flow doppler:   Results for orders placed or performed during the hospital encounter of 11/26/18   2D echo with color flow doppler   Result Value Ref Range    QEF 45 55 - 65    Mitral Valve Regurgitation MILD TO MODERATE     Diastolic Dysfunction Yes (A)     Est. PA Systolic Pressure 69.15 (A)     Tricuspid Valve Regurgitation MILD    , EKG: REviewed and X-Ray: CXR: X-Ray Chest 1 View (CXR): No results found for this visit on 11/26/18. and X-Ray Chest PA and Lateral (CXR): No results found for this visit on 11/26/18.

## 2018-12-02 NOTE — PLAN OF CARE
Problem: Patient Care Overview  Goal: Individualization & Mutuality  Outcome: Ongoing (interventions implemented as appropriate)  Patient continues to be monitored and treated following NSTEMI. Left heart cath planned for tomorrow morning; patient is to be NPO after midnight. Patient verbalizes understanding and acceptance with the plan of care.

## 2018-12-02 NOTE — ASSESSMENT & PLAN NOTE
- Hgb/Hct 9.2/27.8 -- stable   - 1 unit of PRBCs transfused on 11/27/18  - Transfuse 1 PRBC  To keep > 9  Per cardiology recommendation  Before [facility]

## 2018-12-02 NOTE — ASSESSMENT & PLAN NOTE
-Patient who presented in fulminant pulmonary edema/decompensated CHF in setting of MI  -Troponin 4.417>15.866>16.308  -Needs IV diuresis/optimization of medical therapy  -Continue ASA, amlodipine, statin, hydralazine, lopressor  -Add oral nitrates  -Continue heparin gtt for anticoagulation  -Will need R/LHC once optimized. Patient is at high risk of contrast-induced nephropathy/transition to dialysis. All risks, benefits, and treatment alternatives explained to patient in detail.  -Nephrology on board, appreciate assistance     11/28/18  -Stable overnight  -Troponin trending down  -Volume status improved  -No chest pain  -Continue ASA, amlodipine, statin, hydralazine, BB, imdur  -+Temp noted; Will plan on L/RHC for further evaluation once afebrile and compensated  -NPO after MN    11/29/18  -Improving  -Would benefit from additional day of IV diuresis  -Chest pain free  -Afebrile overnight  -Continue ASA, amlodipine, statin, hydralazine, BB, Imdur  -D/C heparin gtt, start Plavix 75 mg daily  -NPO after MN  -LHC tmw by Dr. Holman. All risks, benefits, and treatment alternatives explained in detail. All questions answered. Patient is aware she is at high risk of contrast induced nephropathy and will need dialysis.     12/1/18  -s/p LHC yesterday by Dr. Holman that showed 90% proximal LAD stenosis, 50% proximal LCX stenosis, and diffusely diseased RCA  -Turned down for CABG due to severe calcification and high risk of stroke/morbidity/mortality  -Will proceed with repeat LHC with intervention of LAD on Monday  -Continue ASA, Plavix, amlodipine, BB, hydralazine, Imdur    12/2/18  -Stable overnight  -Repeat LHC planned for tmw by Dr. Holman with intervention of LAD  -Patient aware of high risk of contrast induced nephropathy  -All risks, benefits, and treatment alternatives explained to patient in detail. All questions answered. She has agreed to proceed.  -NPO after MN; 50 cc/hr gentle fluids beginning at 4 AM  -Continue  ASA, Plavix, amlodipine, BB, hydralazine, Imdur

## 2018-12-02 NOTE — ASSESSMENT & PLAN NOTE
-Creatinine 4.9  -Lasix held today  -Nephrology on board, appreciate assistance  -Gentle hydration pre-procedure; patient aware she is at high risk of contrast induced nephropathy/transition to dialysis.

## 2018-12-03 LAB
ALBUMIN SERPL BCP-MCNC: 2.3 G/DL
ANION GAP SERPL CALC-SCNC: 11 MMOL/L
BASOPHILS # BLD AUTO: 0.01 K/UL
BASOPHILS NFR BLD: 0.2 %
BUN SERPL-MCNC: 72 MG/DL
CALCIUM SERPL-MCNC: 8.7 MG/DL
CHLORIDE SERPL-SCNC: 103 MMOL/L
CO2 SERPL-SCNC: 25 MMOL/L
CORONARY STENOSIS: ABNORMAL
CORONARY STENT: YES
CREAT SERPL-MCNC: 4.6 MG/DL
DIFFERENTIAL METHOD: ABNORMAL
EOSINOPHIL # BLD AUTO: 0.1 K/UL
EOSINOPHIL NFR BLD: 2.1 %
ERYTHROCYTE [DISTWIDTH] IN BLOOD BY AUTOMATED COUNT: 13.8 %
EST. GFR  (AFRICAN AMERICAN): 10 ML/MIN/1.73 M^2
EST. GFR  (NON AFRICAN AMERICAN): 9 ML/MIN/1.73 M^2
GLUCOSE SERPL-MCNC: 119 MG/DL
HCT VFR BLD AUTO: 28.8 %
HGB BLD-MCNC: 9.6 G/DL
LYMPHOCYTES # BLD AUTO: 1 K/UL
LYMPHOCYTES NFR BLD: 18.2 %
MCH RBC QN AUTO: 29.6 PG
MCHC RBC AUTO-ENTMCNC: 33.3 G/DL
MCV RBC AUTO: 89 FL
MONOCYTES # BLD AUTO: 0.6 K/UL
MONOCYTES NFR BLD: 11.2 %
NEUTROPHILS # BLD AUTO: 3.9 K/UL
NEUTROPHILS NFR BLD: 68.3 %
PHOSPHATE SERPL-MCNC: 4.4 MG/DL
PLATELET # BLD AUTO: 201 K/UL
PMV BLD AUTO: 9.6 FL
POC ACTIVATED CLOTTING TIME K: 169 SEC (ref 74–137)
POC ACTIVATED CLOTTING TIME K: 197 SEC (ref 74–137)
POC ACTIVATED CLOTTING TIME K: 202 SEC (ref 74–137)
POC ACTIVATED CLOTTING TIME K: 224 SEC (ref 74–137)
POCT GLUCOSE: 103 MG/DL (ref 70–110)
POCT GLUCOSE: 111 MG/DL (ref 70–110)
POCT GLUCOSE: 160 MG/DL (ref 70–110)
POCT GLUCOSE: 62 MG/DL (ref 70–110)
POCT GLUCOSE: 86 MG/DL (ref 70–110)
POTASSIUM SERPL-SCNC: 3.6 MMOL/L
RBC # BLD AUTO: 3.24 M/UL
SAMPLE: ABNORMAL
SODIUM SERPL-SCNC: 139 MMOL/L
WBC # BLD AUTO: 5.71 K/UL

## 2018-12-03 PROCEDURE — 27000221 HC OXYGEN, UP TO 24 HOURS

## 2018-12-03 PROCEDURE — 63600175 PHARM REV CODE 636 W HCPCS: Mod: JG

## 2018-12-03 PROCEDURE — 25000003 PHARM REV CODE 250: Performed by: NURSE PRACTITIONER

## 2018-12-03 PROCEDURE — 25000003 PHARM REV CODE 250: Performed by: PHYSICIAN ASSISTANT

## 2018-12-03 PROCEDURE — 25000003 PHARM REV CODE 250: Performed by: INTERNAL MEDICINE

## 2018-12-03 PROCEDURE — 85025 COMPLETE CBC W/AUTO DIFF WBC: CPT

## 2018-12-03 PROCEDURE — 25000003 PHARM REV CODE 250: Performed by: EMERGENCY MEDICINE

## 2018-12-03 PROCEDURE — 63600175 PHARM REV CODE 636 W HCPCS: Mod: JG | Performed by: INTERNAL MEDICINE

## 2018-12-03 PROCEDURE — 36415 COLL VENOUS BLD VENIPUNCTURE: CPT

## 2018-12-03 PROCEDURE — 99152 MOD SED SAME PHYS/QHP 5/>YRS: CPT | Mod: ,,, | Performed by: INTERNAL MEDICINE

## 2018-12-03 PROCEDURE — 027034Z DILATION OF CORONARY ARTERY, ONE ARTERY WITH DRUG-ELUTING INTRALUMINAL DEVICE, PERCUTANEOUS APPROACH: ICD-10-PCS | Performed by: INTERNAL MEDICINE

## 2018-12-03 PROCEDURE — 27000014 CATH LAB PROCEDURE

## 2018-12-03 PROCEDURE — 80069 RENAL FUNCTION PANEL: CPT

## 2018-12-03 PROCEDURE — 94761 N-INVAS EAR/PLS OXIMETRY MLT: CPT

## 2018-12-03 PROCEDURE — 99233 SBSQ HOSP IP/OBS HIGH 50: CPT | Mod: ,,, | Performed by: INTERNAL MEDICINE

## 2018-12-03 PROCEDURE — 63600175 PHARM REV CODE 636 W HCPCS: Performed by: NURSE PRACTITIONER

## 2018-12-03 PROCEDURE — C1725 CATH, TRANSLUMIN NON-LASER: HCPCS

## 2018-12-03 PROCEDURE — 92933 PRQ TRLML C ATHRC ST ANGIOP1: CPT | Mod: LD,,, | Performed by: INTERNAL MEDICINE

## 2018-12-03 PROCEDURE — C9602 PERC D-E COR STENT ATHER S: HCPCS | Mod: LD

## 2018-12-03 PROCEDURE — 25500020 PHARM REV CODE 255

## 2018-12-03 PROCEDURE — 21400001 HC TELEMETRY ROOM

## 2018-12-03 PROCEDURE — 25000003 PHARM REV CODE 250

## 2018-12-03 RX ORDER — TIROFIBAN HYDROCHLORIDE 50 UG/ML
0.07 INJECTION INTRAVENOUS CONTINUOUS
Status: ACTIVE | OUTPATIENT
Start: 2018-12-03 | End: 2018-12-04

## 2018-12-03 RX ORDER — HYDROMORPHONE HYDROCHLORIDE 1 MG/ML
1 INJECTION, SOLUTION INTRAMUSCULAR; INTRAVENOUS; SUBCUTANEOUS ONCE
Status: COMPLETED | OUTPATIENT
Start: 2018-12-03 | End: 2018-12-03

## 2018-12-03 RX ADMIN — ATORVASTATIN CALCIUM 80 MG: 40 TABLET, FILM COATED ORAL at 08:12

## 2018-12-03 RX ADMIN — ONDANSETRON 4 MG: 2 INJECTION, SOLUTION INTRAMUSCULAR; INTRAVENOUS at 07:12

## 2018-12-03 RX ADMIN — LACTULOSE 15 G: 20 SOLUTION ORAL at 08:12

## 2018-12-03 RX ADMIN — PANTOPRAZOLE SODIUM 40 MG: 40 TABLET, DELAYED RELEASE ORAL at 08:12

## 2018-12-03 RX ADMIN — SODIUM BICARBONATE TAB 325 MG 325 MG: 325 TAB at 08:12

## 2018-12-03 RX ADMIN — LACTULOSE 15 G: 20 SOLUTION ORAL at 09:12

## 2018-12-03 RX ADMIN — METOPROLOL TARTRATE 25 MG: 25 TABLET ORAL at 09:12

## 2018-12-03 RX ADMIN — ISOSORBIDE MONONITRATE 60 MG: 60 TABLET, EXTENDED RELEASE ORAL at 08:12

## 2018-12-03 RX ADMIN — ASPIRIN 81 MG: 81 TABLET, COATED ORAL at 08:12

## 2018-12-03 RX ADMIN — HYDROMORPHONE HYDROCHLORIDE 1 MG: 1 INJECTION, SOLUTION INTRAMUSCULAR; INTRAVENOUS; SUBCUTANEOUS at 06:12

## 2018-12-03 RX ADMIN — TIROFIBAN 0.07 MCG/KG/MIN: 5 INJECTION, SOLUTION INTRAVENOUS at 02:12

## 2018-12-03 RX ADMIN — INSULIN DETEMIR 15 UNITS: 100 INJECTION, SOLUTION SUBCUTANEOUS at 09:12

## 2018-12-03 RX ADMIN — HYDRALAZINE HYDROCHLORIDE 25 MG: 25 TABLET, FILM COATED ORAL at 05:12

## 2018-12-03 RX ADMIN — INSULIN ASPART 1 UNITS: 100 INJECTION, SOLUTION INTRAVENOUS; SUBCUTANEOUS at 09:12

## 2018-12-03 RX ADMIN — PROMETHAZINE HYDROCHLORIDE 6.25 MG: 25 INJECTION INTRAMUSCULAR; INTRAVENOUS at 10:12

## 2018-12-03 RX ADMIN — SODIUM CHLORIDE: 0.9 INJECTION, SOLUTION INTRAVENOUS at 05:12

## 2018-12-03 RX ADMIN — METOPROLOL TARTRATE 25 MG: 25 TABLET ORAL at 08:12

## 2018-12-03 RX ADMIN — DEXTROSE MONOHYDRATE 12.5 G: 25 INJECTION, SOLUTION INTRAVENOUS at 11:12

## 2018-12-03 RX ADMIN — CLOPIDOGREL BISULFATE 75 MG: 75 TABLET ORAL at 08:12

## 2018-12-03 RX ADMIN — ACETAMINOPHEN 650 MG: 325 TABLET ORAL at 04:12

## 2018-12-03 RX ADMIN — HYDRALAZINE HYDROCHLORIDE 25 MG: 25 TABLET, FILM COATED ORAL at 09:12

## 2018-12-03 RX ADMIN — SODIUM BICARBONATE TAB 325 MG 325 MG: 325 TAB at 09:12

## 2018-12-03 NOTE — ASSESSMENT & PLAN NOTE
- Likely cardiorenal syndrome  - Diuresis continued  - strict I&O's  - Avoid nephrotoxic agents.  Hold home ACEi  - Nephrology following   - Baseline creatinine 2.5-3  - She is at risk of contrast induced nephropathy post LHC  - Vascular consulted to start the process for a AVF   -Renal function stable

## 2018-12-03 NOTE — PROGRESS NOTES
[facility] Hospital Medicine  Progress Note    Patient Name: Avril Monzon  MRN: [id number]  Patient Class: IP- Inpatient   Admission Date: 11/26/2018  Length of Stay: 7 days  Attending Physician: Suhas Prakash, *  Primary Care Provider: Rose Parks MD        Subjective:     Principal Problem:NSTEMI (non-ST elevated myocardial infarction)    HPI:  Ms. Monzon is a 72 yo female with a PMHx of HTN, HLD, DM II, CKD stage IV, anemia of CKD, and h/o CVA.  She presented to the ED with c/o SOB that has progressively worsened since yesterday.  Associated nonproductive cough and subjective fever.  No aggravating or alleviating factors.  Denies any CP, palpitations, diaphoresis, N/V, orthopnea, PND, edema, weight gain, ABD pain, diarrhea, dysuria, decreased UOP, hematuria, back pain, HA, AMS, lightheadedness, dizziness, syncope, weakness, numbness, or chills.  Per EMS, patient hypoxic on arrival to scene with O2 sat 80% on RA.  She was given Duoneb x 1 and placed on 4L NC with improved O2 sat of 94%.  Upon arrival to ED, patient's O2 sat 82% on 4L NC.  She was placed on BiPAP with increased O2 sat of 100%.  Work-up in ED resulted cr. 4.1, BUN 69, glucose 336, BNP 1613, troponin 4.417, lactic 2.7 > 1.5.  ABG with pH 7.324, pCO2 31.5, pO2 144, HCO3 16.4, BE -10 on BiPAP.  CXR showed pulmonary vascular congestion, pulmonary edema, and small bilateral pleural effusions.  EKG showed normal sinus rhythm with inferior ST depression, no previous to compare.  ASA 325mg, IV lopressor 5mg, and IV Lasix 120mg given per ED.  Case discussed with Cardiology in ED and IV heparin drip initiated.  Hospital Medicine called for admission.  Currently, patient appears comfortable in NAD.  Reports SOB improved on BiPAP.  Denies any CP.  No h/o CAD.  Denies having stress test in the past.  2D echo in 6/2018 showed LVEF of 60-65%, normal diastolic function, and no significant valvular abnormalities.      Hospital  Course:  Pt admitted to Inpatient status for NSTEMI with Troponin significantly elevated.  Cardiology consulted with Heparin infusion initiated.  EKG positive for ST depression.  Pt placed on BIPAP due to respiratory distress and later weaned to high flow oxygen.  Pulmonology consulted.  Chest xray verified pulmonary congestion with positive response to diuresis noted.  BNP elevated. Pt noted to renal failure with Creatinine of 4 and Nephrology consulted.  Baseline creatinine 2.5-3 noted.  Echo results showed EF 45% with diastolic dysfunction, PAP 69, +WMA, Pulm HTN, and mild to moderate MR.  LHC planned for tomorrow. H/H 8.2/25.3 with 1 unit of PRBCs transfused.  Procalcitonin of 0.66.  Blood culture results pending.  Pt denies acute distress at this time. As of 11/30 R/LHC scheduled today showed Lad prox 90% mid 50% lcx prox 50% rca diffusely diseased 70% normal filling pressures moderate pulmonary htn. Cardiology recommending CABG vs LAD arthrectomy stent. CVT consulted, not amenable to CABG due to severe calcification and high risk of morbidity and mortality. 12/3/18- Awaiting LHC today with intervention of LAD. Denies any chest pain or SOB.     Interval History: No acute events overnight. Awaiting LHC.     Review of Systems   Constitutional: Negative for activity change, appetite change, fatigue and fever.   HENT: Negative for congestion, facial swelling, sore throat, trouble swallowing and voice change.    Eyes: Negative for redness and visual disturbance.   Respiratory: Negative for apnea, cough, chest tightness, shortness of breath and wheezing.    Cardiovascular: Negative for chest pain, palpitations and leg swelling.   Gastrointestinal: Negative for abdominal distention, abdominal pain, blood in stool, constipation, diarrhea, nausea and vomiting.   Genitourinary: Negative for decreased urine volume, difficulty urinating, dysuria, flank pain, frequency, hematuria, pelvic pain and urgency.   Musculoskeletal:  Negative for back pain, gait problem and joint swelling.   Skin: Negative for color change and rash.   Neurological: Negative for dizziness, syncope, weakness and headaches.   Hematological: Does not bruise/bleed easily.   Psychiatric/Behavioral: Negative for agitation, behavioral problems and confusion. The patient is not nervous/anxious.      Objective:     Vital Signs (Most Recent):  Temp: 98.5 °F (36.9 °C) (12/03/18 1205)  Pulse: 62 (12/03/18 1205)  Resp: 18 (12/03/18 1205)  BP: (!) 152/66 (12/03/18 1205)  SpO2: 95 % (12/03/18 1205) Vital Signs (24h Range):  Temp:  [97.8 °F (36.6 °C)-99.2 °F (37.3 °C)] 98.5 °F (36.9 °C)  Pulse:  [56-75] 62  Resp:  [16-20] 18  SpO2:  [95 %-100 %] 95 %  BP: (131-160)/(61-73) 152/66     Weight: 71.5 kg (157 lb 10.1 oz)  Body mass index is 26.23 kg/m².    Intake/Output Summary (Last 24 hours) at 12/3/2018 1402  Last data filed at 12/3/2018 1118  Gross per 24 hour   Intake 566.75 ml   Output 1850 ml   Net -1283.25 ml      Physical Exam   Constitutional: She is oriented to person, place, and time. She appears well-developed and well-nourished.   Neck: No JVD present.   Cardiovascular: Normal rate, regular rhythm and normal heart sounds. Exam reveals no friction rub.   Pulmonary/Chest: Effort normal and breath sounds normal. No respiratory distress. She has no rales.   Abdominal: Soft. Bowel sounds are normal.   Musculoskeletal: She exhibits no edema.   Neurological: She is alert and oriented to person, place, and time.   Skin: Skin is warm and dry.   Psychiatric: She has a normal mood and affect. Her behavior is normal.   Nursing note and vitals reviewed.      Significant Labs:   BMP:   Recent Labs   Lab 12/03/18  0418   *      K 3.6      CO2 25   BUN 72*   CREATININE 4.6*   CALCIUM 8.7     CBC:   Recent Labs   Lab 12/02/18  0401 12/03/18  0418   WBC 4.34 5.71   HGB 8.6* 9.6*   HCT 27.0* 28.8*    201     CMP:   Recent Labs   Lab 12/02/18  0401 12/03/18  0418     139   K 3.9 3.6    103   CO2 23 25   * 119*   BUN 74* 72*   CREATININE 4.9* 4.6*   CALCIUM 8.8 8.7   ALBUMIN 2.2* 2.3*   ANIONGAP 13 11   EGFRNONAA 8* 9*     Cardiac Markers: No results for input(s): CKMB, MYOGLOBIN, BNP, TROPISTAT in the last 48 hours.  Troponin: No results for input(s): TROPONINI in the last 48 hours.  All pertinent labs within the past 24 hours have been reviewed.    Significant Imaging:   Imaging Results          X-Ray Chest AP Portable (Final result)  Result time 11/26/18 17:53:35    Final result by Sebastien Pettit III, MD (11/26/18 17:53:35)                 Impression:      Probable bilateral perihilar edema/congestion although cannot exclude pneumonia.  Follow-up recommended      Electronically signed by: Sebastien Pettit MD  Date:    11/26/2018  Time:    17:53             Narrative:    EXAMINATION:  XR CHEST AP PORTABLE    CLINICAL HISTORY:  Shortness of breath    COMPARISON:  May 2017    FINDINGS:  The heart size is normal.  Extensive bilateral infiltrates greatest in the perihilar Artem greater on the left.  This may be due to pulmonary edema, asymmetric however bilateral pneumonia cannot be excluded.  Follow-up recommended.                              Assessment/Plan:      * NSTEMI (non-ST elevated myocardial infarction)    - Cardiology following  - Serial troponin 4.417>>15.86>>16.308  - Continue ASA, Statin, BB  - 2D ECHO with EF 45-50% with diastolic dysfunction and pulmonary HTN  - [facility] today showed Lad prox 90% mid 50% lcx prox 50% rca diffusely diseased 70% normal filling pressures moderate pulmonary htn. She is at risk of contrast induced nephropathy post LHC  - Cardiology recommending CABG vs LAD arthrectomy stent  - CVT consulted . Recommend  No surgery due to high risk   12/3/18  -Turn down for CABG   -[facility] planned for today with intervention of LAD      Stage 5 chronic kidney disease    Nephrology on board       GREGG on CKD, stage IV    - Likely  cardiorenal syndrome  - Diuresis continued  - strict I&O's  - Avoid nephrotoxic agents.  Hold home ACEi  - Nephrology following   - Baseline creatinine 2.5-3  - She is at risk of contrast induced nephropathy post LHC  - Vascular consulted to start the process for a AVF   -Renal function stable      Hyperlipidemia    - Continue Statin     Essential hypertension    - BP stable  - Resume home Hydralazine and Amlodipine  - Continue BB and diuresis as above  - Continue to hold ACEI  -Continue current plan of care      Acute hypoxemic respiratory failure secondary to acute decompensated HF    - Cardiology following  - Continue Lasix 80 mg IVP BID  - Strict I&O's, daily weights  - Na/fluid restriction.  - 2D echo results showed EF 45% with diastolic dysfunction, PAP 69, +WMA, and mild to moderate MR.   - Cardiology and Pulmonology following  - Weaned off Vapotherm, currently on nasal cannula  12/3/18  -Stable       Anemia in stage 4 chronic kidney disease    - Hgb/Hct 9.2/27.8 -- stable   - 1 unit of PRBCs transfused on 11/27/18  - Transfuse 1 PRBC  To keep > 9  Per cardiology recommendation  Before [facility]   12/3/18  -Stable        Controlled type 2 diabetes mellitus with diabetic polyneuropathy, with long-term current use of insulin    - HgbA1c 6.7 -- contolled  - Accuchecks with moderate SSI  - Detemir 15 units SQ nightly  -Stable          Constipation    Lactulose  Po x 1   glycerine suppository  X 1   + BM        VTE Risk Mitigation (From admission, onward)        Ordered     IP VTE HIGH RISK PATIENT  Once      11/27/18 1342     Place sequential compression device  Until discontinued      11/27/18 1342              Iram Mcgill NP  Department of Hospital Medicine   [facility]

## 2018-12-03 NOTE — ASSESSMENT & PLAN NOTE
-Patient who presented in fulminant pulmonary edema/decompensated CHF in setting of MI  -Troponin 4.417>15.866>16.308  -Needs IV diuresis/optimization of medical therapy  -Continue ASA, amlodipine, statin, hydralazine, lopressor  -Add oral nitrates  -Continue heparin gtt for anticoagulation  -Will need R/LHC once optimized. Patient is at high risk of contrast-induced nephropathy/transition to dialysis. All risks, benefits, and treatment alternatives explained to patient in detail.  -Nephrology on board, appreciate assistance     11/28/18  -Stable overnight  -Troponin trending down  -Volume status improved  -No chest pain  -Continue ASA, amlodipine, statin, hydralazine, BB, imdur  -+Temp noted; Will plan on L/RHC for further evaluation once afebrile and compensated  -NPO after MN    11/29/18  -Improving  -Would benefit from additional day of IV diuresis  -Chest pain free  -Afebrile overnight  -Continue ASA, amlodipine, statin, hydralazine, BB, Imdur  -D/C heparin gtt, start Plavix 75 mg daily  -NPO after MN  -LHC tmw by Dr. Holman. All risks, benefits, and treatment alternatives explained in detail. All questions answered. Patient is aware she is at high risk of contrast induced nephropathy and will need dialysis.     12/1/18  -s/p LHC yesterday by Dr. Holman that showed 90% proximal LAD stenosis, 50% proximal LCX stenosis, and diffusely diseased RCA  -Turned down for CABG due to severe calcification and high risk of stroke/morbidity/mortality  -Will proceed with repeat LHC with intervention of LAD on Monday  -Continue ASA, Plavix, amlodipine, BB, hydralazine, Imdur    12/2/18  -Stable overnight  -Repeat LHC planned for tmw by Dr. Holman with intervention of LAD  -Patient aware of high risk of contrast induced nephropathy  -All risks, benefits, and treatment alternatives explained to patient in detail. All questions answered. She has agreed to proceed.  -NPO after MN; 50 cc/hr gentle fluids beginning at 4 AM  -Continue  ASA, Plavix, amlodipine, BB, hydralazine, Imdur    12/3/18  -Turned down for CABG  -Awaiting staged PCI procedure later today with Dr. Holman  -Continue current medical management for now   -Continue ASA, Plavix, amlodipine, BB, hydralazine, Imdur

## 2018-12-03 NOTE — PROGRESS NOTES
"[facility]  Cardiology  Progress Note    Patient Name: Avril Monzon  MRN: [id number]  Admission Date: 11/26/2018  Hospital Length of Stay: 7 days  Code Status: Full Code   Attending Physician: Suhas Prakash, *   Primary Care Physician: Rose Parks MD  Expected Discharge Date:   Principal Problem:NSTEMI (non-ST elevated myocardial infarction)    Subjective:   Brief HPI:    Ms. Monzon is a 73 year old female patient with a PMHx of HTN, hyperlipidemia, DM type II, former tobacco abuse, CKD, anemia, and CVA who presented to [facility] ED last night with a chief complaint of progressively worsening SOB over the past 2-3 days. Patient stated she began to feel like she was coming down with a "cold or sinus infection" on Friday and developed a persistent cough when lying down. Associated symptoms included fatigue and subjective fever. Patient denied any associated ministerio chest pain, palpitations, weight gain, nausea, vomiting, diaphoresis, near syncope, or syncope. Initial workup in ED revealed creatinine of 4.1, BNP of 1613, troponin of 4.4, and lactic acid of 2.7. CXR showed evidence of fluid overload. EKG showed inferior ST depression and patient subsequently admitted for further evaluation and treatment. Cardiology consulted to assist with management. Patient seen and examined today while in ED. States SOB is improving. Remains chest pain free. Denies any prior cardiac history-no known CAD or history of MI. Patient reports compliance with her medications. Chart reviewed. Troponin 4417>15.866>16.308. Repeat EKG this AM showed diffuse ST depression in anterior leads. 2D echo showed EF of 45-50%, +WMA, pulmonary HTN, and mild to moderate MR.    Hospital Course:   11/28/18-Patient seen and examined today, sitting up in bed. Feels better. SOB improving. Remains chest pain free. Diuresed well. Temp of 101 noted overnight. Labs reviewed. Creatinine 4.2.     11/29/18-Patient seen and examined today. " Still mildly SOB. No chest pain. Afebrile overnight. Creatinine 4.3.    12/1/18-Patient seen and examined today, s/p LHC yesterday that showed 90% proximal LAD stenosis, 50% proximal LCX stenosis, and diffuse diseased RCA. Feels well. No complaints. Denies chest pain or SOB. Labs reviewed. Creatinine stable at 4.1. CVT consulted, not amenable to CABG due to severe calcification and high risk of morbidity and mortality.     12/2/18-Patient seen and examined today, sitting up in bed. Feels well. No cardiac complaints. Denies chest pain or SOB. Labs reviewed. Creatinine 4.9. Repeat LHC planned for tmw with intervention of LAD.    12/3/18- Patient in bed resting comfortably, awaiting LHC today after being turned down by CABG. Has no chest pain or angina equivalent. NS infusing. Labs and vitals stable. Is NPO         Review of Systems   Constitution: Negative for diaphoresis, weakness, malaise/fatigue, weight gain and weight loss.   HENT: Negative for congestion and nosebleeds.    Cardiovascular: Negative for chest pain, claudication, cyanosis, dyspnea on exertion, irregular heartbeat, leg swelling, near-syncope, orthopnea, palpitations, paroxysmal nocturnal dyspnea and syncope.   Respiratory: Negative for cough, hemoptysis, shortness of breath, sleep disturbances due to breathing, snoring, sputum production and wheezing.    Hematologic/Lymphatic: Negative for bleeding problem. Does not bruise/bleed easily.   Skin: Negative for rash.   Musculoskeletal: Negative for arthritis, back pain, falls, joint pain, muscle cramps and muscle weakness.   Gastrointestinal: Negative for abdominal pain, constipation, diarrhea, heartburn, hematemesis, hematochezia, melena and nausea.   Genitourinary: Negative for dysuria, hematuria and nocturia.   Neurological: Negative for excessive daytime sleepiness, dizziness, headaches, light-headedness, loss of balance, numbness and vertigo.     Objective:     Vital Signs (Most Recent):  Temp: 98.5  °F (36.9 °C) (12/03/18 1205)  Pulse: 62 (12/03/18 1205)  Resp: 18 (12/03/18 1205)  BP: (!) 152/66 (12/03/18 1205)  SpO2: 95 % (12/03/18 1205) Vital Signs (24h Range):  Temp:  [97.8 °F (36.6 °C)-99.2 °F (37.3 °C)] 98.5 °F (36.9 °C)  Pulse:  [56-75] 62  Resp:  [16-20] 18  SpO2:  [95 %-100 %] 95 %  BP: (131-160)/(61-73) 152/66     Weight: 71.5 kg (157 lb 10.1 oz)  Body mass index is 26.23 kg/m².     SpO2: 95 %  O2 Device (Oxygen Therapy): (S) nasal cannula w/ humidification(placed on room air)      Intake/Output Summary (Last 24 hours) at 12/3/2018 1245  Last data filed at 12/3/2018 1118  Gross per 24 hour   Intake 566.75 ml   Output 1850 ml   Net -1283.25 ml       Lines/Drains/Airways     Peripheral Intravenous Line                 Peripheral IV - Single Lumen 12/01/18 0622 Right Forearm 2 days                Physical Exam   Constitutional: She is oriented to person, place, and time. She appears well-developed and well-nourished.   Neck: Neck supple. No JVD present.   Cardiovascular: Normal rate, regular rhythm, normal heart sounds and normal pulses. Exam reveals no friction rub.   No murmur heard.  Pulmonary/Chest: Effort normal and breath sounds normal. No respiratory distress. She has no wheezes. She has no rales.   Abdominal: Soft. Bowel sounds are normal. She exhibits no distension.   Musculoskeletal: She exhibits no edema or tenderness.   Neurological: She is alert and oriented to person, place, and time.   Skin: Skin is warm and dry. No rash noted.   Psychiatric: She has a normal mood and affect. Her behavior is normal.   Nursing note and vitals reviewed.      Significant Labs:   All pertinent lab results from the last 24 hours have been reviewed. and   Recent Lab Results       12/03/18  1154   12/03/18  1109   12/03/18  0531   12/03/18  0418   12/02/18  2137        Unit Blood Type Code               Unit Expiration               Unit Blood Type               Albumin       2.3       Anion Gap       11        Baso #       0.01       Basophil%       0.2       BUN, Bld       72       Calcium       8.7       Chloride       103       CO2       25       CODING SYSTEM               Creatinine       4.6       Differential Method       Automated       DISPENSE STATUS               eGFR if        10       eGFR if non        9  Comment:  Calculation used to obtain the estimated glomerular filtration  rate (eGFR) is the CKD-EPI equation.          Eos #       0.1       Eosinophil%       2.1       Glucose       119       Gran # (ANC)       3.9       Gran%       68.3       Group & Rh               Hematocrit       28.8       Hemoglobin       9.6       INDIRECT JOSE               Lymph #       1.0       Lymph%       18.2       MCH       29.6       MCHC       33.3       MCV       89  Comment:  Results confirmed, test repeated       Mono #       0.6       Mono%       11.2       MPV       9.6       Phosphorus       4.4       Platelets       201       POCT Glucose 111 62 103   211     Potassium       3.6       PRODUCT CODE               RBC       3.24       RDW       13.8       Sodium       139       UNIT NUMBER               WBC       5.71                        12/02/18  1621   12/02/18  1549   12/02/18  1536        Unit Blood Type Code     6200     Unit Expiration     [id number]     Unit Blood Type     A POS     Albumin           Anion Gap           Baso #           Basophil%           BUN, Bld           Calcium           Chloride           CO2           CODING SYSTEM     [id number]     Creatinine           Differential Method           DISPENSE STATUS     TRANSFUSED     eGFR if            eGFR if non            Eos #           Eosinophil%           Glucose           Gran # (ANC)           Gran%           Group & Rh   AB POS       Hematocrit           Hemoglobin           INDIRECT JOSE   NEG       Lymph #           Lymph%           MCH           MCHC           MCV            Mono #           Mono%           MPV           Phosphorus           Platelets           POCT Glucose 251         Potassium           PRODUCT CODE     [id number]     RBC           RDW           Sodium           UNIT NUMBER     [id number]     WBC                 Significant Imaging: Echocardiogram:   2D echo with color flow doppler:   Results for orders placed or performed during the hospital encounter of 11/26/18   2D echo with color flow doppler   Result Value Ref Range    QEF 45 55 - 65    Mitral Valve Regurgitation MILD TO MODERATE     Diastolic Dysfunction Yes (A)     Est. PA Systolic Pressure 69.15 (A)     Tricuspid Valve Regurgitation MILD      Assessment and Plan:       * NSTEMI (non-ST elevated myocardial infarction)    -Patient who presented in fulminant pulmonary edema/decompensated CHF in setting of MI  -Troponin 4.417>15.866>16.308  -Needs IV diuresis/optimization of medical therapy  -Continue ASA, amlodipine, statin, hydralazine, lopressor  -Add oral nitrates  -Continue heparin gtt for anticoagulation  -Will need R/LHC once optimized. Patient is at high risk of contrast-induced nephropathy/transition to dialysis. All risks, benefits, and treatment alternatives explained to patient in detail.  -Nephrology on board, appreciate assistance     11/28/18  -Stable overnight  -Troponin trending down  -Volume status improved  -No chest pain  -Continue ASA, amlodipine, statin, hydralazine, BB, imdur  -+Temp noted; Will plan on L/RHC for further evaluation once afebrile and compensated  -NPO after MN    11/29/18  -Improving  -Would benefit from additional day of IV diuresis  -Chest pain free  -Afebrile overnight  -Continue ASA, amlodipine, statin, hydralazine, BB, Imdur  -D/C heparin gtt, start Plavix 75 mg daily  -NPO after MN  -LHC tmw by Dr. Holman. All risks, benefits, and treatment alternatives explained in detail. All questions answered. Patient is aware she is at high risk of contrast induced nephropathy  and will need dialysis.     12/1/18  -s/p LHC yesterday by Dr. Holman that showed 90% proximal LAD stenosis, 50% proximal LCX stenosis, and diffusely diseased RCA  -Turned down for CABG due to severe calcification and high risk of stroke/morbidity/mortality  -Will proceed with repeat LHC with intervention of LAD on Monday  -Continue ASA, Plavix, amlodipine, BB, hydralazine, Imdur    12/2/18  -Stable overnight  -Repeat LHC planned for tmw by Dr. Holman with intervention of LAD  -Patient aware of high risk of contrast induced nephropathy  -All risks, benefits, and treatment alternatives explained to patient in detail. All questions answered. She has agreed to proceed.  -NPO after MN; 50 cc/hr gentle fluids beginning at 4 AM  -Continue ASA, Plavix, amlodipine, BB, hydralazine, Imdur    12/3/18  -Turned down for CABG  -Awaiting staged PCI procedure later today with Dr. Holman  -Continue current medical management for now   -Continue ASA, Plavix, amlodipine, BB, hydralazine, Imdur       GREGG on CKD, stage IV    -Creatinine 4.9  -Lasix held today  -Nephrology on board, appreciate assistance  -Gentle hydration pre-procedure; patient aware she is at high risk of contrast induced nephropathy/transition to dialysis.     12/3/18  -Renal function stable.  -continue NS in prep for staged PCI today      Hyperlipidemia    -Continue statin       Essential hypertension    -Continue amlodipine, Lopressor, hydralazine, imdur       Acute hypoxemic respiratory failure secondary to acute decompensated HF    -Patient who presents with acute decompensated CHF secondary to NSTEMI/MI  -Needs IV diuresis  -Strict I's/O's  -Assess response  -Continue other meds  -No ACEI/ARB given underlying renal function  -Nephrology on board    11/28/18  -Clinically improved  -Lasix decreased to once daily given borderline BP  -Continue other meds  -No ACEI/ARB given renal function  -Appreciate nephrology assistance    11/29/18  -Improving  -Continue additional  day of IV diuresis  -No ACEI/ARB given renal function  -Nephrology on board    12/1/18  -Stable  -Continue same meds    12/2/18  -Stable   -Continue same meds     Anemia in stage 4 chronic kidney disease    -H and H 8.6/27.0  -Would benefit from transfusion in anticipation of LHC  -Will discuss with hospital medicine    12/3/18  -H/H stable          VTE Risk Mitigation (From admission, onward)        Ordered     IP VTE HIGH RISK PATIENT  Once      11/27/18 1342     Place sequential compression device  Until discontinued      11/27/18 1342      Chart reviewed. Patient examined by Dr. Mejia  and agrees with plan that has been outlined.       JAREN Gipson  Cardiology  [facility]

## 2018-12-03 NOTE — ASSESSMENT & PLAN NOTE
- BP stable  - Resume home Hydralazine and Amlodipine  - Continue BB and diuresis as above  - Continue to hold ACEI  -Continue current plan of care

## 2018-12-03 NOTE — SUBJECTIVE & OBJECTIVE
Interval History: Pt was seen and examined. No new c/o's, no new events last pm. Pt is s/p stent to LAD today. No CP, no SOB, no new c/o's.    Review of patient's allergies indicates:   Allergen Reactions    Codeine Swelling    Darvon [propoxyphene] Swelling     Current Facility-Administered Medications   Medication Frequency    0.9%  NaCl infusion (for blood administration) Q24H PRN    0.9%  NaCl infusion (for blood administration) Q24H PRN    0.9%  NaCl infusion Continuous    acetaminophen tablet 650 mg Q6H PRN    aspirin EC tablet 81 mg Daily    atorvastatin tablet 80 mg Daily    calcifediol Cs24 30 mcg Daily    clopidogrel tablet 75 mg Daily    dextrose 50% injection 12.5 g PRN    glucagon (human recombinant) injection 1 mg PRN    glucose chewable tablet 16 g PRN    glucose chewable tablet 24 g PRN    hydrALAZINE tablet 25 mg Q8H    insulin aspart U-100 pen 1-10 Units QID (AC + HS) PRN    insulin detemir U-100 pen 15 Units QHS    isosorbide mononitrate 24 hr tablet 60 mg Daily    lactulose 20 gram/30 mL solution Soln 15 g TID    metoprolol tartrate (LOPRESSOR) tablet 25 mg BID    nitroGLYCERIN SL tablet 0.4 mg Q5 Min PRN    ondansetron injection 4 mg Q6H PRN    pantoprazole EC tablet 40 mg Daily    promethazine (PHENERGAN) 6.25 mg in dextrose 5 % 50 mL IVPB Q6H PRN    sodium bicarbonate tablet 325 mg TID    tirofiban 12.5 mg in sodium chloride 0.9% 250 mL infusion Continuous       Objective:     Vital Signs (Most Recent):  Temp: 98.5 °F (36.9 °C) (12/03/18 1205)  Pulse: 66 (12/03/18 1440)  Resp: 12 (12/03/18 1440)  BP: (!) 141/48 (12/03/18 1440)  SpO2: 97 % (12/03/18 1440)  O2 Device (Oxygen Therapy): room air (12/03/18 1440) Vital Signs (24h Range):  Temp:  [97.8 °F (36.6 °C)-99.2 °F (37.3 °C)] 98.5 °F (36.9 °C)  Pulse:  [56-75] 66  Resp:  [12-20] 12  SpO2:  [95 %-100 %] 97 %  BP: (131-160)/(48-73) 141/48     Weight: 71.5 kg (157 lb 10.1 oz) (12/03/18 0532)  Body mass index is 26.23  kg/m².  Body surface area is 1.81 meters squared.    I/O last 3 completed shifts:  In: 802.8 [P.O.:484; Blood:318.8]  Out: 2000 [Urine:2000]    Physical Exam   Constitutional: She is oriented to person, place, and time. She appears well-developed and well-nourished.   Neck: No JVD present.   Cardiovascular: Normal rate, regular rhythm and normal heart sounds. Exam reveals no friction rub.   Pulmonary/Chest: Effort normal and breath sounds normal. No respiratory distress. She has no rales.   Abdominal: Soft. Bowel sounds are normal.   Musculoskeletal: She exhibits no edema.   Neurological: She is alert and oriented to person, place, and time.   Skin: Skin is warm and dry.   Psychiatric: She has a normal mood and affect. Her behavior is normal.   Nursing note and vitals reviewed.      Significant Labs: reviewed  BMP  Lab Results   Component Value Date     12/03/2018    K 3.6 12/03/2018     12/03/2018    CO2 25 12/03/2018    BUN 72 (H) 12/03/2018    CREATININE 4.6 (H) 12/03/2018    CALCIUM 8.7 12/03/2018    ANIONGAP 11 12/03/2018    ESTGFRAFRICA 10 (A) 12/03/2018    EGFRNONAA 9 (A) 12/03/2018     Lab Results   Component Value Date    WBC 5.71 12/03/2018    HGB 9.6 (L) 12/03/2018    HCT 28.8 (L) 12/03/2018    MCV 89 12/03/2018     12/03/2018          Significant Imaging: reviewed

## 2018-12-03 NOTE — INTERVAL H&P NOTE
The patient has been examined and the H&P has been reviewed:    I concur with the findings and no changes have occurred since H&P was written.  Has had a heart cath showing proximal lad and rca disease was turned down for surgery by DR NARANJO DUE TO CALCIFIED AORTA SHE PRESENTS FOR LAD INTERVENTION.  Anesthesia/Surgery risks, benefits and alternative options discussed and understood by patient/family.  I have explained the risks, benefits , and alternatives of the procedure in detail.the patient voices understanding and all questions have been answered.the patient agrees to proceed as planned.          Active Hospital Problems    Diagnosis  POA    *NSTEMI (non-ST elevated myocardial infarction) [I21.4]  Yes    Stage 5 chronic kidney disease [N18.5]  Yes    Coronary artery disease involving native coronary artery of native heart [I25.10]  Yes    Heart failure [I50.9]  Yes    Shortness of breath [R06.02]  Unknown    Diabetic nephropathy associated with type 2 diabetes mellitus [E11.21]  Yes    Non-ST elevation MI (NSTEMI) [I21.4]  Yes    Pulmonary hypertension [I27.20]  Yes    Essential hypertension [I10]  Yes     Chronic    Hyperlipidemia [E78.5]  Yes     Chronic    GREGG on CKD, stage IV [N18.4]  Yes    Acute hypoxemic respiratory failure secondary to acute decompensated HF [J96.01]  Yes    Anemia in stage 4 chronic kidney disease [N18.4, D63.1]  Yes     Chronic    Constipation [K59.00]  Yes    Controlled type 2 diabetes mellitus with diabetic polyneuropathy, with long-term current use of insulin [E11.42, Z79.4]  Not Applicable     Chronic      Resolved Hospital Problems   No resolved problems to display.

## 2018-12-03 NOTE — PROGRESS NOTES
[facility]  Nephrology  Progress Note    Patient Name: Avril Monzon  MRN: [id number]  Admission Date: 11/26/2018  Hospital Length of Stay: 7 days  Attending Provider: Suhas Prakash, *   Primary Care Physician: Rose Parks MD  Principal Problem:NSTEMI (non-ST elevated myocardial infarction)    Subjective:     HPI: Avril Monzon is a pleasant 73-year-old  woman with history of hypertension, type 2 diabetes, CKD stage 4, followed in our clinic by Dr. Doe, baseline serum creatinine about 2.5-3, patient also has coronary artery disease, she presents to the emergency room with some chest discomfort and shortness of breath, patient diagnosed with NSTEMI, also acute on chronic kidney disease, serum creatinine at 4 mg/dL, likely due to hemodynamic reasons, we were consulted for acute on chronic renal failure, possible need for left heart catheterization,    Interval History: Pt was seen and examined. No new c/o's, no new events last pm. Pt is s/p stent to LAD today. No CP, no SOB, no new c/o's.    Review of patient's allergies indicates:   Allergen Reactions    Codeine Swelling    Darvon [propoxyphene] Swelling     Current Facility-Administered Medications   Medication Frequency    0.9%  NaCl infusion (for blood administration) Q24H PRN    0.9%  NaCl infusion (for blood administration) Q24H PRN    0.9%  NaCl infusion Continuous    acetaminophen tablet 650 mg Q6H PRN    aspirin EC tablet 81 mg Daily    atorvastatin tablet 80 mg Daily    calcifediol Cs24 30 mcg Daily    clopidogrel tablet 75 mg Daily    dextrose 50% injection 12.5 g PRN    glucagon (human recombinant) injection 1 mg PRN    glucose chewable tablet 16 g PRN    glucose chewable tablet 24 g PRN    hydrALAZINE tablet 25 mg Q8H    insulin aspart U-100 pen 1-10 Units QID (AC + HS) PRN    insulin detemir U-100 pen 15 Units QHS    isosorbide mononitrate 24 hr tablet 60 mg Daily    lactulose 20 gram/30  mL solution Soln 15 g TID    metoprolol tartrate (LOPRESSOR) tablet 25 mg BID    nitroGLYCERIN SL tablet 0.4 mg Q5 Min PRN    ondansetron injection 4 mg Q6H PRN    pantoprazole EC tablet 40 mg Daily    promethazine (PHENERGAN) 6.25 mg in dextrose 5 % 50 mL IVPB Q6H PRN    sodium bicarbonate tablet 325 mg TID    tirofiban 12.5 mg in sodium chloride 0.9% 250 mL infusion Continuous       Objective:     Vital Signs (Most Recent):  Temp: 98.5 °F (36.9 °C) (12/03/18 1205)  Pulse: 66 (12/03/18 1440)  Resp: 12 (12/03/18 1440)  BP: (!) 141/48 (12/03/18 1440)  SpO2: 97 % (12/03/18 1440)  O2 Device (Oxygen Therapy): room air (12/03/18 1440) Vital Signs (24h Range):  Temp:  [97.8 °F (36.6 °C)-99.2 °F (37.3 °C)] 98.5 °F (36.9 °C)  Pulse:  [56-75] 66  Resp:  [12-20] 12  SpO2:  [95 %-100 %] 97 %  BP: (131-160)/(48-73) 141/48     Weight: 71.5 kg (157 lb 10.1 oz) (12/03/18 0532)  Body mass index is 26.23 kg/m².  Body surface area is 1.81 meters squared.    I/O last 3 completed shifts:  In: 802.8 [P.O.:484; Blood:318.8]  Out: 2000 [Urine:2000]    Physical Exam   Constitutional: She is oriented to person, place, and time. She appears well-developed and well-nourished.   Neck: No JVD present.   Cardiovascular: Normal rate, regular rhythm and normal heart sounds. Exam reveals no friction rub.   Pulmonary/Chest: Effort normal and breath sounds normal. No respiratory distress. She has no rales.   Abdominal: Soft. Bowel sounds are normal.   Musculoskeletal: She exhibits no edema.   Neurological: She is alert and oriented to person, place, and time.   Skin: Skin is warm and dry.   Psychiatric: She has a normal mood and affect. Her behavior is normal.   Nursing note and vitals reviewed.      Significant Labs: reviewed  BMP  Lab Results   Component Value Date     12/03/2018    K 3.6 12/03/2018     12/03/2018    CO2 25 12/03/2018    BUN 72 (H) 12/03/2018    CREATININE 4.6 (H) 12/03/2018    CALCIUM 8.7 12/03/2018    ANIONGAP  11 12/03/2018    ESTGFRAFRICA 10 (A) 12/03/2018    EGFRNONAA 9 (A) 12/03/2018     Lab Results   Component Value Date    WBC 5.71 12/03/2018    HGB 9.6 (L) 12/03/2018    HCT 28.8 (L) 12/03/2018    MCV 89 12/03/2018     12/03/2018          Significant Imaging: reviewed    Assessment/Plan:         72 y/o female with advanced CKD presented with NSTEMI:                  Chronic kidney disease, stage V     Advanced CKD stage 5 at baseline   Cr stable, not worse  Received contrast on 11/30/18 and today    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  No indications for acute HD     HTN: BP controlled, hypotension resolved after stopping amlodipine  Meds reviewed               * NSTEMI (non-ST elevated myocardial infarction)     Cardiac: CAD, NSTEMI  Acute decompensation of heart failure  S/p [facility] on 11/30/18  S/p LAD stent today  Was turned down for CABG  Hemodynamically stable                   Plans and recommendations:  As discussed above  Referrd to vascular for vein mapping and AVF placement for chronic HD (non-urgent)               Angela Hanson MD  Nephrology  [facility]

## 2018-12-03 NOTE — ASSESSMENT & PLAN NOTE
- HgbA1c 6.7 -- contolled  - Accuchecks with moderate SSI  - Detemir 15 units SQ nightly  -Stable

## 2018-12-03 NOTE — ASSESSMENT & PLAN NOTE
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  No indications for acute HD     HTN: BP controlled, hypotension resolved after stopping amlodipine  Meds reviewed               * NSTEMI (non-ST elevated myocardial infarction)     Cardiac: CAD, NSTEMI  Acute decompensation of heart failure  S/p LHC on 11/30/18  Has extensive 2-3 vessel disease  Has also extensive ascending aorta calcification  Was turned down for CABG  Pt will have angioplasty on Mon                   Plans and recommendations:  As discussed above  Referrd to vascular for vein mapping and AVF placement for chronic HD (non-urgent)

## 2018-12-03 NOTE — ASSESSMENT & PLAN NOTE
- Cardiology following  - Continue Lasix 80 mg IVP BID  - Strict I&O's, daily weights  - Na/fluid restriction.  - 2D echo results showed EF 45% with diastolic dysfunction, PAP 69, +WMA, and mild to moderate MR.   - Cardiology and Pulmonology following  - Weaned off Vapotherm, currently on nasal cannula  12/3/18  -Stable

## 2018-12-03 NOTE — SUBJECTIVE & OBJECTIVE
Interval History: No acute events overnight. Awaiting [facility].     Review of Systems   Constitutional: Negative for activity change, appetite change, fatigue and fever.   HENT: Negative for congestion, facial swelling, sore throat, trouble swallowing and voice change.    Eyes: Negative for redness and visual disturbance.   Respiratory: Negative for apnea, cough, chest tightness, shortness of breath and wheezing.    Cardiovascular: Negative for chest pain, palpitations and leg swelling.   Gastrointestinal: Negative for abdominal distention, abdominal pain, blood in stool, constipation, diarrhea, nausea and vomiting.   Genitourinary: Negative for decreased urine volume, difficulty urinating, dysuria, flank pain, frequency, hematuria, pelvic pain and urgency.   Musculoskeletal: Negative for back pain, gait problem and joint swelling.   Skin: Negative for color change and rash.   Neurological: Negative for dizziness, syncope, weakness and headaches.   Hematological: Does not bruise/bleed easily.   Psychiatric/Behavioral: Negative for agitation, behavioral problems and confusion. The patient is not nervous/anxious.      Objective:     Vital Signs (Most Recent):  Temp: 98.5 °F (36.9 °C) (12/03/18 1205)  Pulse: 62 (12/03/18 1205)  Resp: 18 (12/03/18 1205)  BP: (!) 152/66 (12/03/18 1205)  SpO2: 95 % (12/03/18 1205) Vital Signs (24h Range):  Temp:  [97.8 °F (36.6 °C)-99.2 °F (37.3 °C)] 98.5 °F (36.9 °C)  Pulse:  [56-75] 62  Resp:  [16-20] 18  SpO2:  [95 %-100 %] 95 %  BP: (131-160)/(61-73) 152/66     Weight: 71.5 kg (157 lb 10.1 oz)  Body mass index is 26.23 kg/m².    Intake/Output Summary (Last 24 hours) at 12/3/2018 1402  Last data filed at 12/3/2018 1118  Gross per 24 hour   Intake 566.75 ml   Output 1850 ml   Net -1283.25 ml      Physical Exam   Constitutional: She is oriented to person, place, and time. She appears well-developed and well-nourished.   Neck: No JVD present.   Cardiovascular: Normal rate, regular rhythm and  normal heart sounds. Exam reveals no friction rub.   Pulmonary/Chest: Effort normal and breath sounds normal. No respiratory distress. She has no rales.   Abdominal: Soft. Bowel sounds are normal.   Musculoskeletal: She exhibits no edema.   Neurological: She is alert and oriented to person, place, and time.   Skin: Skin is warm and dry.   Psychiatric: She has a normal mood and affect. Her behavior is normal.   Nursing note and vitals reviewed.      Significant Labs:   BMP:   Recent Labs   Lab 12/03/18 0418   *      K 3.6      CO2 25   BUN 72*   CREATININE 4.6*   CALCIUM 8.7     CBC:   Recent Labs   Lab 12/02/18  0401 12/03/18 0418   WBC 4.34 5.71   HGB 8.6* 9.6*   HCT 27.0* 28.8*    201     CMP:   Recent Labs   Lab 12/02/18  0401 12/03/18 0418    139   K 3.9 3.6    103   CO2 23 25   * 119*   BUN 74* 72*   CREATININE 4.9* 4.6*   CALCIUM 8.8 8.7   ALBUMIN 2.2* 2.3*   ANIONGAP 13 11   EGFRNONAA 8* 9*     Cardiac Markers: No results for input(s): CKMB, MYOGLOBIN, BNP, TROPISTAT in the last 48 hours.  Troponin: No results for input(s): TROPONINI in the last 48 hours.  All pertinent labs within the past 24 hours have been reviewed.    Significant Imaging:   Imaging Results          X-Ray Chest AP Portable (Final result)  Result time 11/26/18 17:53:35    Final result by Sebastien Pettit III, MD (11/26/18 17:53:35)                 Impression:      Probable bilateral perihilar edema/congestion although cannot exclude pneumonia.  Follow-up recommended      Electronically signed by: Sebastien Pettit MD  Date:    11/26/2018  Time:    17:53             Narrative:    EXAMINATION:  XR CHEST AP PORTABLE    CLINICAL HISTORY:  Shortness of breath    COMPARISON:  May 2017    FINDINGS:  The heart size is normal.  Extensive bilateral infiltrates greatest in the perihilar Artem greater on the left.  This may be due to pulmonary edema, asymmetric however bilateral pneumonia cannot be  excluded.  Follow-up recommended.

## 2018-12-03 NOTE — NURSING
Dr. Holman called to confirm that pt would be going to the cath lab around 11:00 . PT will be NPO after midnight. Family is aware.

## 2018-12-03 NOTE — OP NOTE
INPATIENT Operative Note         SUMMARY     Surgery Date: 12/3/2018     Surgeon(s) and Role:     * Michelle Holman MD - Primary    ASSISTANT:none    Pre-op Diagnosis:  NSTEMI (non-ST elevated myocardial infarction) [I21.4]      Post-op Diagnosis:  nstemi not surgical candidate.    Procedure(s) (LRB):  CATHETERIZATION, HEART, LEFT (Left)  stent atherectomy lad  COMPLICATION:none    Anesthesia: RN IV Sedation    Findings/Key Components:  Lad 90% ca treated with atherectomy and stenting with 0% residue.  Had transient spapsm in the lcx needing nitro to resolve.    Estimated Blood Loss: < 50 ML.         SPECIMEN: NONE    Devices/Prostetics: resolute 3.5x15 mm    PLAN:  Routine post stent care.

## 2018-12-03 NOTE — ASSESSMENT & PLAN NOTE
- Cardiology following  - Serial troponin 4.417>>15.86>>16.308  - Continue ASA, Statin, BB  - 2D ECHO with EF 45-50% with diastolic dysfunction and pulmonary HTN  - LHC today showed Lad prox 90% mid 50% lcx prox 50% rca diffusely diseased 70% normal filling pressures moderate pulmonary htn. She is at risk of contrast induced nephropathy post LHC  - Cardiology recommending CABG vs LAD arthrectomy stent  - CVT consulted . Recommend  No surgery due to high risk   12/3/18  -Turn down for CABG   -LHC planned for today with intervention of LAD

## 2018-12-03 NOTE — PLAN OF CARE
Problem: Patient Care Overview  Goal: Plan of Care Review  Outcome: Ongoing (interventions implemented as appropriate)  POC reviewed with patient. Pt verbalized understanding  Pt remains free of injuries and falls; fall precaution in place.   NR on tele monitor.   IV saline locked' intact.  Pt finished 1 unit of blood on shift.  No other c/o at this time.  Oxygen at 3 L NC.  Going for heart cath in the afternoon.  Bed low, side rails x2, call light in reach, personal belongings at bedside.  Reminded to call for assistance.  Repositioned independently.  Hourly rounding complete. Will continue to monitor.

## 2018-12-03 NOTE — ASSESSMENT & PLAN NOTE
- Hgb/Hct 9.2/27.8 -- stable   - 1 unit of PRBCs transfused on 11/27/18  - Transfuse 1 PRBC  To keep > 9  Per cardiology recommendation  Before C   12/3/18  -Stable      Femoral artery stent placement  10/04/2018  Right femoral to below knee popliteal bypass with 8 mm gortex, right femoral and popiteal endarterectomy, left SFA angioplasty and stenting 6x40 luminex, aortogram with bilateral runoff: 10/7 pt found with acute Right posterior tibial DVT

## 2018-12-03 NOTE — SUBJECTIVE & OBJECTIVE
Review of Systems   Constitution: Negative for diaphoresis, weakness, malaise/fatigue, weight gain and weight loss.   HENT: Negative for congestion and nosebleeds.    Cardiovascular: Negative for chest pain, claudication, cyanosis, dyspnea on exertion, irregular heartbeat, leg swelling, near-syncope, orthopnea, palpitations, paroxysmal nocturnal dyspnea and syncope.   Respiratory: Negative for cough, hemoptysis, shortness of breath, sleep disturbances due to breathing, snoring, sputum production and wheezing.    Hematologic/Lymphatic: Negative for bleeding problem. Does not bruise/bleed easily.   Skin: Negative for rash.   Musculoskeletal: Negative for arthritis, back pain, falls, joint pain, muscle cramps and muscle weakness.   Gastrointestinal: Negative for abdominal pain, constipation, diarrhea, heartburn, hematemesis, hematochezia, melena and nausea.   Genitourinary: Negative for dysuria, hematuria and nocturia.   Neurological: Negative for excessive daytime sleepiness, dizziness, headaches, light-headedness, loss of balance, numbness and vertigo.     Objective:     Vital Signs (Most Recent):  Temp: 98.5 °F (36.9 °C) (12/03/18 1205)  Pulse: 62 (12/03/18 1205)  Resp: 18 (12/03/18 1205)  BP: (!) 152/66 (12/03/18 1205)  SpO2: 95 % (12/03/18 1205) Vital Signs (24h Range):  Temp:  [97.8 °F (36.6 °C)-99.2 °F (37.3 °C)] 98.5 °F (36.9 °C)  Pulse:  [56-75] 62  Resp:  [16-20] 18  SpO2:  [95 %-100 %] 95 %  BP: (131-160)/(61-73) 152/66     Weight: 71.5 kg (157 lb 10.1 oz)  Body mass index is 26.23 kg/m².     SpO2: 95 %  O2 Device (Oxygen Therapy): (S) nasal cannula w/ humidification(placed on room air)      Intake/Output Summary (Last 24 hours) at 12/3/2018 1245  Last data filed at 12/3/2018 1118  Gross per 24 hour   Intake 566.75 ml   Output 1850 ml   Net -1283.25 ml       Lines/Drains/Airways     Peripheral Intravenous Line                 Peripheral IV - Single Lumen 12/01/18 0622 Right Forearm 2 days                 Physical Exam   Constitutional: She is oriented to person, place, and time. She appears well-developed and well-nourished.   Neck: Neck supple. No JVD present.   Cardiovascular: Normal rate, regular rhythm, normal heart sounds and normal pulses. Exam reveals no friction rub.   No murmur heard.  Pulmonary/Chest: Effort normal and breath sounds normal. No respiratory distress. She has no wheezes. She has no rales.   Abdominal: Soft. Bowel sounds are normal. She exhibits no distension.   Musculoskeletal: She exhibits no edema or tenderness.   Neurological: She is alert and oriented to person, place, and time.   Skin: Skin is warm and dry. No rash noted.   Psychiatric: She has a normal mood and affect. Her behavior is normal.   Nursing note and vitals reviewed.      Significant Labs:   All pertinent lab results from the last 24 hours have been reviewed. and   Recent Lab Results       12/03/18  1154   12/03/18  1109   12/03/18  0531   12/03/18  0418   12/02/18  2137        Unit Blood Type Code               Unit Expiration               Unit Blood Type               Albumin       2.3       Anion Gap       11       Baso #       0.01       Basophil%       0.2       BUN, Bld       72       Calcium       8.7       Chloride       103       CO2       25       CODING SYSTEM               Creatinine       4.6       Differential Method       Automated       DISPENSE STATUS               eGFR if        10       eGFR if non        9  Comment:  Calculation used to obtain the estimated glomerular filtration  rate (eGFR) is the CKD-EPI equation.          Eos #       0.1       Eosinophil%       2.1       Glucose       119       Gran # (ANC)       3.9       Gran%       68.3       Group & Rh               Hematocrit       28.8       Hemoglobin       9.6       INDIRECT JOSE               Lymph #       1.0       Lymph%       18.2       MCH       29.6       MCHC       33.3       MCV        89  Comment:  Results confirmed, test repeated       Mono #       0.6       Mono%       11.2       MPV       9.6       Phosphorus       4.4       Platelets       201       POCT Glucose 111 62 103   211     Potassium       3.6       PRODUCT CODE               RBC       3.24       RDW       13.8       Sodium       139       UNIT NUMBER               WBC       5.71                        12/02/18  1621   12/02/18  1549   12/02/18  1536        Unit Blood Type Code     6200     Unit Expiration     [id number]     Unit Blood Type     A POS     Albumin           Anion Gap           Baso #           Basophil%           BUN, Bld           Calcium           Chloride           CO2           CODING SYSTEM     [id number]     Creatinine           Differential Method           DISPENSE STATUS     TRANSFUSED     eGFR if            eGFR if non            Eos #           Eosinophil%           Glucose           Gran # (ANC)           Gran%           Group & Rh   AB POS       Hematocrit           Hemoglobin           INDIRECT JOSE   NEG       Lymph #           Lymph%           MCH           MCHC           MCV           Mono #           Mono%           MPV           Phosphorus           Platelets           POCT Glucose 251         Potassium           PRODUCT CODE     [id number]     RBC           RDW           Sodium           UNIT NUMBER     [id number]     WBC                 Significant Imaging: Echocardiogram:   2D echo with color flow doppler:   Results for orders placed or performed during the hospital encounter of 11/26/18   2D echo with color flow doppler   Result Value Ref Range    QEF 45 55 - 65    Mitral Valve Regurgitation MILD TO MODERATE     Diastolic Dysfunction Yes (A)     Est. PA Systolic Pressure 69.15 (A)     Tricuspid Valve Regurgitation MILD

## 2018-12-04 VITALS
SYSTOLIC BLOOD PRESSURE: 138 MMHG | HEIGHT: 65 IN | HEART RATE: 70 BPM | TEMPERATURE: 99 F | DIASTOLIC BLOOD PRESSURE: 64 MMHG | OXYGEN SATURATION: 97 % | RESPIRATION RATE: 18 BRPM | WEIGHT: 151.25 LBS | BODY MASS INDEX: 25.2 KG/M2

## 2018-12-04 LAB
ANION GAP SERPL CALC-SCNC: 13 MMOL/L
BASOPHILS # BLD AUTO: 0.01 K/UL
BASOPHILS NFR BLD: 0.2 %
BUN SERPL-MCNC: 68 MG/DL
CALCIUM SERPL-MCNC: 8.4 MG/DL
CHLORIDE SERPL-SCNC: 106 MMOL/L
CO2 SERPL-SCNC: 20 MMOL/L
CREAT SERPL-MCNC: 4 MG/DL
DIFFERENTIAL METHOD: ABNORMAL
EOSINOPHIL # BLD AUTO: 0 K/UL
EOSINOPHIL NFR BLD: 0 %
ERYTHROCYTE [DISTWIDTH] IN BLOOD BY AUTOMATED COUNT: 14.1 %
EST. GFR  (AFRICAN AMERICAN): 12 ML/MIN/1.73 M^2
EST. GFR  (NON AFRICAN AMERICAN): 11 ML/MIN/1.73 M^2
GLUCOSE SERPL-MCNC: 142 MG/DL
HCT VFR BLD AUTO: 28.1 %
HGB BLD-MCNC: 9.4 G/DL
LYMPHOCYTES # BLD AUTO: 0.6 K/UL
LYMPHOCYTES NFR BLD: 8.4 %
MCH RBC QN AUTO: 30.1 PG
MCHC RBC AUTO-ENTMCNC: 33.5 G/DL
MCV RBC AUTO: 90 FL
MONOCYTES # BLD AUTO: 0.4 K/UL
MONOCYTES NFR BLD: 5.7 %
NEUTROPHILS # BLD AUTO: 5.6 K/UL
NEUTROPHILS NFR BLD: 85.7 %
PLATELET # BLD AUTO: 241 K/UL
PMV BLD AUTO: 9.3 FL
POCT GLUCOSE: 164 MG/DL (ref 70–110)
POCT GLUCOSE: 204 MG/DL (ref 70–110)
POTASSIUM SERPL-SCNC: 4.7 MMOL/L
RBC # BLD AUTO: 3.12 M/UL
SODIUM SERPL-SCNC: 139 MMOL/L
WBC # BLD AUTO: 6.52 K/UL

## 2018-12-04 PROCEDURE — 36415 COLL VENOUS BLD VENIPUNCTURE: CPT

## 2018-12-04 PROCEDURE — 80048 BASIC METABOLIC PNL TOTAL CA: CPT

## 2018-12-04 PROCEDURE — 25000003 PHARM REV CODE 250: Performed by: NURSE PRACTITIONER

## 2018-12-04 PROCEDURE — 25000003 PHARM REV CODE 250: Performed by: EMERGENCY MEDICINE

## 2018-12-04 PROCEDURE — 85025 COMPLETE CBC W/AUTO DIFF WBC: CPT

## 2018-12-04 PROCEDURE — 63600175 PHARM REV CODE 636 W HCPCS: Performed by: INTERNAL MEDICINE

## 2018-12-04 PROCEDURE — 99233 SBSQ HOSP IP/OBS HIGH 50: CPT | Mod: ,,, | Performed by: INTERNAL MEDICINE

## 2018-12-04 PROCEDURE — 90471 IMMUNIZATION ADMIN: CPT | Performed by: INTERNAL MEDICINE

## 2018-12-04 PROCEDURE — 25000003 PHARM REV CODE 250: Performed by: INTERNAL MEDICINE

## 2018-12-04 PROCEDURE — 25000003 PHARM REV CODE 250: Performed by: PHYSICIAN ASSISTANT

## 2018-12-04 PROCEDURE — G0009 ADMIN PNEUMOCOCCAL VACCINE: HCPCS | Performed by: INTERNAL MEDICINE

## 2018-12-04 PROCEDURE — 90670 PCV13 VACCINE IM: CPT | Performed by: INTERNAL MEDICINE

## 2018-12-04 RX ORDER — CLOPIDOGREL BISULFATE 75 MG/1
75 TABLET ORAL DAILY
Qty: 30 TABLET | Refills: 0 | Status: SHIPPED | OUTPATIENT
Start: 2018-12-05 | End: 2019-01-04 | Stop reason: SDUPTHER

## 2018-12-04 RX ORDER — ASPIRIN 81 MG/1
81 TABLET ORAL DAILY
Refills: 0 | COMMUNITY
Start: 2018-12-05 | End: 2022-07-05 | Stop reason: SDUPTHER

## 2018-12-04 RX ORDER — NITROGLYCERIN 0.4 MG/1
0.4 TABLET SUBLINGUAL EVERY 5 MIN PRN
Qty: 20 TABLET | Refills: 0 | Status: SHIPPED | OUTPATIENT
Start: 2018-12-04 | End: 2021-12-06

## 2018-12-04 RX ORDER — HYDRALAZINE HYDROCHLORIDE 25 MG/1
25 TABLET, FILM COATED ORAL EVERY 8 HOURS
Qty: 90 TABLET | Refills: 0 | Status: SHIPPED | OUTPATIENT
Start: 2018-12-04 | End: 2019-01-04

## 2018-12-04 RX ORDER — SODIUM CHLORIDE 9 MG/ML
INJECTION, SOLUTION INTRAVENOUS CONTINUOUS
Status: ACTIVE | OUTPATIENT
Start: 2018-12-04 | End: 2018-12-04

## 2018-12-04 RX ORDER — ISOSORBIDE MONONITRATE 60 MG/1
60 TABLET, EXTENDED RELEASE ORAL DAILY
Qty: 30 TABLET | Refills: 0 | Status: SHIPPED | OUTPATIENT
Start: 2018-12-05 | End: 2019-01-04

## 2018-12-04 RX ORDER — METOPROLOL TARTRATE 25 MG/1
25 TABLET, FILM COATED ORAL 2 TIMES DAILY
Qty: 60 TABLET | Refills: 0 | Status: SHIPPED | OUTPATIENT
Start: 2018-12-04 | End: 2019-01-04 | Stop reason: SDUPTHER

## 2018-12-04 RX ADMIN — LACTULOSE 15 G: 20 SOLUTION ORAL at 08:12

## 2018-12-04 RX ADMIN — METOPROLOL TARTRATE 25 MG: 25 TABLET ORAL at 08:12

## 2018-12-04 RX ADMIN — PNEUMOCOCCAL 13-VALENT CONJUGATE VACCINE 0.5 ML: 2.2; 2.2; 2.2; 2.2; 2.2; 4.4; 2.2; 2.2; 2.2; 2.2; 2.2; 2.2; 2.2 INJECTION, SUSPENSION INTRAMUSCULAR at 12:12

## 2018-12-04 RX ADMIN — CLOPIDOGREL BISULFATE 75 MG: 75 TABLET ORAL at 08:12

## 2018-12-04 RX ADMIN — INSULIN ASPART 4 UNITS: 100 INJECTION, SOLUTION INTRAVENOUS; SUBCUTANEOUS at 11:12

## 2018-12-04 RX ADMIN — ASPIRIN 81 MG: 81 TABLET, COATED ORAL at 08:12

## 2018-12-04 RX ADMIN — PANTOPRAZOLE SODIUM 40 MG: 40 TABLET, DELAYED RELEASE ORAL at 08:12

## 2018-12-04 RX ADMIN — HYDRALAZINE HYDROCHLORIDE 25 MG: 25 TABLET, FILM COATED ORAL at 03:12

## 2018-12-04 RX ADMIN — INSULIN ASPART 2 UNITS: 100 INJECTION, SOLUTION INTRAVENOUS; SUBCUTANEOUS at 05:12

## 2018-12-04 RX ADMIN — ACETAMINOPHEN 650 MG: 325 TABLET ORAL at 12:12

## 2018-12-04 RX ADMIN — ISOSORBIDE MONONITRATE 60 MG: 60 TABLET, EXTENDED RELEASE ORAL at 08:12

## 2018-12-04 RX ADMIN — ATORVASTATIN CALCIUM 80 MG: 40 TABLET, FILM COATED ORAL at 08:12

## 2018-12-04 RX ADMIN — SODIUM BICARBONATE TAB 325 MG 325 MG: 325 TAB at 03:12

## 2018-12-04 RX ADMIN — SODIUM BICARBONATE TAB 325 MG 325 MG: 325 TAB at 08:12

## 2018-12-04 RX ADMIN — HYDRALAZINE HYDROCHLORIDE 25 MG: 25 TABLET, FILM COATED ORAL at 05:12

## 2018-12-04 NOTE — ASSESSMENT & PLAN NOTE
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  No indications for acute HD     HTN: BP controlled, hypotension resolved after stopping amlodipine  Meds reviewed               * NSTEMI (non-ST elevated myocardial infarction)     Cardiac: CAD, NSTEMI  Acute decompensation of heart failure  S/p [facility] on 11/30/18  S/p LAD stent today  Was turned down for CABG  Hemodynamically stable                   Plans and recommendations:  As discussed above  Referrd to vascular for vein mapping and AVF placement for chronic HD (non-urgent)

## 2018-12-04 NOTE — PROGRESS NOTES
"[facility]  Cardiology  Progress Note    Patient Name: Avril Monzon  MRN: [id number]  Admission Date: 11/26/2018  Hospital Length of Stay: 8 days  Code Status: Full Code   Attending Physician: Suhas Prakash, *   Primary Care Physician: Rose Parks MD  Expected Discharge Date: 12/4/2018  Principal Problem:NSTEMI (non-ST elevated myocardial infarction)    Subjective:   Brief HPI:    Ms. Monzon is a 73 year old female patient with a PMHx of HTN, hyperlipidemia, DM type II, former tobacco abuse, CKD, anemia, and CVA who presented to [facility] ED last night with a chief complaint of progressively worsening SOB over the past 2-3 days. Patient stated she began to feel like she was coming down with a "cold or sinus infection" on Friday and developed a persistent cough when lying down. Associated symptoms included fatigue and subjective fever. Patient denied any associated ministerio chest pain, palpitations, weight gain, nausea, vomiting, diaphoresis, near syncope, or syncope. Initial workup in ED revealed creatinine of 4.1, BNP of 1613, troponin of 4.4, and lactic acid of 2.7. CXR showed evidence of fluid overload. EKG showed inferior ST depression and patient subsequently admitted for further evaluation and treatment. Cardiology consulted to assist with management. Patient seen and examined today while in ED. States SOB is improving. Remains chest pain free. Denies any prior cardiac history-no known CAD or history of MI. Patient reports compliance with her medications. Chart reviewed. Troponin 4417>15.866>16.308. Repeat EKG this AM showed diffuse ST depression in anterior leads. 2D echo showed EF of 45-50%, +WMA, pulmonary HTN, and mild to moderate MR.    Hospital Course:   11/28/18-Patient seen and examined today, sitting up in bed. Feels better. SOB improving. Remains chest pain free. Diuresed well. Temp of 101 noted overnight. Labs reviewed. Creatinine 4.2.     11/29/18-Patient seen and examined " today. Still mildly SOB. No chest pain. Afebrile overnight. Creatinine 4.3.    12/1/18-Patient seen and examined today, s/p LHC yesterday that showed 90% proximal LAD stenosis, 50% proximal LCX stenosis, and diffuse diseased RCA. Feels well. No complaints. Denies chest pain or SOB. Labs reviewed. Creatinine stable at 4.1. CVT consulted, not amenable to CABG due to severe calcification and high risk of morbidity and mortality.     12/2/18-Patient seen and examined today, sitting up in bed. Feels well. No cardiac complaints. Denies chest pain or SOB. Labs reviewed. Creatinine 4.9. Repeat LHC planned for tmw with intervention of LAD.    12/3/18- Patient in bed resting comfortably, awaiting LHC today after being turned down by CABG. Has no chest pain or angina equivalent. NS infusing. Labs and vitals stable. Is NPO     12/4/18- Patient is s/p successful PCI to LAD. Tolerated procedure well. Renal function stable this morning. Plans in place for OP Nephrology follow up and HD access implant. No need for HD at this time, but will need. BP stable. Has no chest pain or angina equivalent.        Review of Systems   Constitution: Negative for diaphoresis, weakness, malaise/fatigue, weight gain and weight loss.   HENT: Negative for congestion and nosebleeds.    Cardiovascular: Negative for chest pain, claudication, cyanosis, dyspnea on exertion, irregular heartbeat, leg swelling, near-syncope, orthopnea, palpitations, paroxysmal nocturnal dyspnea and syncope.   Respiratory: Negative for cough, hemoptysis, shortness of breath, sleep disturbances due to breathing, snoring, sputum production and wheezing.    Hematologic/Lymphatic: Negative for bleeding problem. Does not bruise/bleed easily.   Skin: Negative for rash.   Musculoskeletal: Negative for arthritis, back pain, falls, joint pain, muscle cramps and muscle weakness.   Gastrointestinal: Negative for abdominal pain, constipation, diarrhea, heartburn, hematemesis,  hematochezia, melena and nausea.   Genitourinary: Negative for dysuria, hematuria and nocturia.   Neurological: Negative for excessive daytime sleepiness, dizziness, headaches, light-headedness, loss of balance, numbness and vertigo.     Objective:     Vital Signs (Most Recent):  Temp: 98.4 °F (36.9 °C) (12/04/18 0800)  Pulse: 80 (12/04/18 0900)  Resp: 18 (12/04/18 0800)  BP: 132/65 (12/04/18 0800)  SpO2: 98 % (12/04/18 0800) Vital Signs (24h Range):  Temp:  [97.7 °F (36.5 °C)-98.5 °F (36.9 °C)] 98.4 °F (36.9 °C)  Pulse:  [57-80] 80  Resp:  [10-19] 18  SpO2:  [95 %-100 %] 98 %  BP: (107-152)/(40-66) 132/65     Weight: 68.6 kg (151 lb 3.8 oz)  Body mass index is 25.17 kg/m².     SpO2: 98 %  O2 Device (Oxygen Therapy): room air      Intake/Output Summary (Last 24 hours) at 12/4/2018 1025  Last data filed at 12/4/2018 0800  Gross per 24 hour   Intake 240 ml   Output 1150 ml   Net -910 ml       Lines/Drains/Airways     Peripheral Intravenous Line                 Peripheral IV - Single Lumen 12/01/18 0622 Right Forearm 3 days                Physical Exam   Constitutional: She is oriented to person, place, and time. She appears well-developed and well-nourished.   Neck: Neck supple. No JVD present.   Cardiovascular: Normal rate, regular rhythm, normal heart sounds and normal pulses. Exam reveals no friction rub.   No murmur heard.  Pulmonary/Chest: Effort normal and breath sounds normal. No respiratory distress. She has no wheezes. She has no rales.   Abdominal: Soft. Bowel sounds are normal. She exhibits no distension.   Musculoskeletal: She exhibits no edema or tenderness.   Neurological: She is alert and oriented to person, place, and time.   Skin: Skin is warm and dry. No rash noted.   Right radial access site without redness, tenderness, swelling, or drainage. There is no active external bleeding or hematoma.   Psychiatric: She has a normal mood and affect. Her behavior is normal.   Nursing note and vitals  reviewed.      Significant Labs:   All pertinent lab results from the last 24 hours have been reviewed. and   Recent Lab Results       12/04/18  0554   12/04/18  0500   12/03/18  2136   12/03/18  1706   12/03/18  1645        Anion Gap   13           Baso #   0.01           Basophil%   0.2           BUN, Bld   68           Calcium   8.4           Chloride   106           CO2   20           Coronary Stenosis               Coronary Stent               Creatinine   4.0           Differential Method   Automated           eGFR if    12           eGFR if non    11  Comment:  Calculation used to obtain the estimated glomerular filtration  rate (eGFR) is the CKD-EPI equation.              Eos #   0.0           Eosinophil%   0.0           Glucose   142           Gran # (ANC)   5.6           Gran%   85.7           Hematocrit   28.1           Hemoglobin   9.4           Lymph #   0.6           Lymph%   8.4           MCH   30.1           MCHC   33.5           MCV   90           Mono #   0.4           Mono%   5.7           MPV   9.3           Platelets   241           POC ACTIVATED CLOTTING TIME K         169     POCT Glucose 164   160 86       Potassium   4.7           RBC   3.12           RDW   14.1           Sample         unknown     Sodium   139           WBC   6.52                            12/03/18  1527   12/03/18  1356   12/03/18  1335   12/03/18  1240   12/03/18  1154        Anion Gap               Baso #               Basophil%               BUN, Bld               Calcium               Chloride               CO2               Coronary Stenosis       >= 50%       Coronary Stent       Yes       Creatinine               Differential Method               eGFR if                eGFR if non                Eos #               Eosinophil%               Glucose               Gran # (ANC)               Gran%               Hematocrit               Hemoglobin                Lymph #               Lymph%               MCH               MCHC               MCV               Mono #               Mono%               MPV               Platelets               POC ACTIVATED CLOTTING TIME K 202 224 197         POCT Glucose         111     Potassium               RBC               RDW               Sample unknown unknown unknown         Sodium               WBC                                12/03/18  1109        Anion Gap       Baso #       Basophil%       BUN, Bld       Calcium       Chloride       CO2       Coronary Stenosis       Coronary Stent       Creatinine       Differential Method       eGFR if        eGFR if non        Eos #       Eosinophil%       Glucose       Gran # (ANC)       Gran%       Hematocrit       Hemoglobin       Lymph #       Lymph%       MCH       MCHC       MCV       Mono #       Mono%       MPV       Platelets       POC ACTIVATED CLOTTING TIME K       POCT Glucose 62     Potassium       RBC       RDW       Sample       Sodium       WBC             Significant Imaging: Echocardiogram:   2D echo with color flow doppler:   Results for orders placed or performed during the hospital encounter of 11/26/18   2D echo with color flow doppler   Result Value Ref Range    QEF 45 55 - 65    Mitral Valve Regurgitation MILD TO MODERATE     Diastolic Dysfunction Yes (A)     Est. PA Systolic Pressure 69.15 (A)     Tricuspid Valve Regurgitation MILD      Assessment and Plan:       * NSTEMI (non-ST elevated myocardial infarction)    -Patient who presented in fulminant pulmonary edema/decompensated CHF in setting of MI  -Troponin 4.417>15.866>16.308  -Needs IV diuresis/optimization of medical therapy  -Continue ASA, amlodipine, statin, hydralazine, lopressor  -Add oral nitrates  -Continue heparin gtt for anticoagulation  -Will need R/LHC once optimized. Patient is at high risk of contrast-induced nephropathy/transition to dialysis. All risks, benefits,  and treatment alternatives explained to patient in detail.  -Nephrology on board, appreciate assistance     11/28/18  -Stable overnight  -Troponin trending down  -Volume status improved  -No chest pain  -Continue ASA, amlodipine, statin, hydralazine, BB, imdur  -+Temp noted; Will plan on L/RHC for further evaluation once afebrile and compensated  -NPO after MN    11/29/18  -Improving  -Would benefit from additional day of IV diuresis  -Chest pain free  -Afebrile overnight  -Continue ASA, amlodipine, statin, hydralazine, BB, Imdur  -D/C heparin gtt, start Plavix 75 mg daily  -NPO after MN  -LHC tmw by Dr. Holman. All risks, benefits, and treatment alternatives explained in detail. All questions answered. Patient is aware she is at high risk of contrast induced nephropathy and will need dialysis.     12/1/18  -s/p LHC yesterday by Dr. Holman that showed 90% proximal LAD stenosis, 50% proximal LCX stenosis, and diffusely diseased RCA  -Turned down for CABG due to severe calcification and high risk of stroke/morbidity/mortality  -Will proceed with repeat LHC with intervention of LAD on Monday  -Continue ASA, Plavix, amlodipine, BB, hydralazine, Imdur    12/2/18  -Stable overnight  -Repeat LHC planned for tmw by Dr. Holman with intervention of LAD  -Patient aware of high risk of contrast induced nephropathy  -All risks, benefits, and treatment alternatives explained to patient in detail. All questions answered. She has agreed to proceed.  -NPO after MN; 50 cc/hr gentle fluids beginning at 4 AM  -Continue ASA, Plavix, amlodipine, BB, hydralazine, Imdur    12/3/18  -Turned down for CABG  -Awaiting staged PCI procedure later today with Dr. Holman  -Continue current medical management for now   -Continue ASA, Plavix, amlodipine, BB, hydralazine, Imdur    12/4/18  -Successful PCI of LAD   -no recurrent chest pain or angina equivalent   -Continue ASA, Plavix, BB, Imdur, CCB  -Needs to follow up in Cardiology next week   -Needs to  keep appt with Nephrology for CKD with plans for HD in the near future        GREGG on CKD, stage IV    -Creatinine 4.9  -Lasix held today  -Nephrology on board, appreciate assistance  -Gentle hydration pre-procedure; patient aware she is at high risk of contrast induced nephropathy/transition to dialysis.      Hyperlipidemia    -Continue statin       Essential hypertension    -Continue amlodipine, Lopressor, hydralazine, imdur       Acute hypoxemic respiratory failure secondary to acute decompensated HF    -Patient who presents with acute decompensated CHF secondary to NSTEMI/MI  -Needs IV diuresis  -Strict I's/O's  -Assess response  -Continue other meds  -No ACEI/ARB given underlying renal function  -Nephrology on board    11/28/18  -Clinically improved  -Lasix decreased to once daily given borderline BP  -Continue other meds  -No ACEI/ARB given renal function  -Appreciate nephrology assistance    11/29/18  -Improving  -Continue additional day of IV diuresis  -No ACEI/ARB given renal function  -Nephrology on board    12/1/18  -Stable  -Continue same meds    12/2/18  -Stable   -Continue same meds     Anemia in stage 4 chronic kidney disease    -H and H 8.6/27.0  -Would benefit from transfusion in anticipation of LHC  -Will discuss with hospital medicine         VTE Risk Mitigation (From admission, onward)        Ordered     IP VTE HIGH RISK PATIENT  Once      11/27/18 1342     Place sequential compression device  Until discontinued      11/27/18 1342        Chart reviewed. Patient examined by Dr. Mejia and agrees with plan that has been outlined.     JAREN Gipson  Cardiology  [facility]

## 2018-12-04 NOTE — PROGRESS NOTES
[facility]  Nephrology  Progress Note    Patient Name: Avril Monzon  MRN: [id number]  Admission Date: 11/26/2018  Hospital Length of Stay: 8 days  Attending Provider: Suhas Prakash, *   Primary Care Physician: Rose Parks MD  Principal Problem:NSTEMI (non-ST elevated myocardial infarction)    Subjective:     HPI: Avril Monzon is a pleasant 73-year-old  woman with history of hypertension, type 2 diabetes, CKD stage 4, followed in our clinic by Dr. Doe, baseline serum creatinine about 2.5-3, patient also has coronary artery disease, she presents to the emergency room with some chest discomfort and shortness of breath, patient diagnosed with NSTEMI, also acute on chronic kidney disease, serum creatinine at 4 mg/dL, likely due to hemodynamic reasons, we were consulted for acute on chronic renal failure, possible need for left heart catheterization,    Interval History: Pt was seen and examined. No new events, no CP, no SOB.    Review of patient's allergies indicates:   Allergen Reactions    Codeine Swelling    Darvon [propoxyphene] Swelling     Current Facility-Administered Medications   Medication Frequency    0.9%  NaCl infusion (for blood administration) Q24H PRN    0.9%  NaCl infusion Continuous    acetaminophen tablet 650 mg Q6H PRN    aspirin EC tablet 81 mg Daily    atorvastatin tablet 80 mg Daily    calcifediol Cs24 30 mcg Daily    clopidogrel tablet 75 mg Daily    dextrose 50% injection 12.5 g PRN    glucagon (human recombinant) injection 1 mg PRN    glucose chewable tablet 16 g PRN    glucose chewable tablet 24 g PRN    hydrALAZINE tablet 25 mg Q8H    insulin aspart U-100 pen 1-10 Units QID (AC + HS) PRN    insulin detemir U-100 pen 15 Units QHS    isosorbide mononitrate 24 hr tablet 60 mg Daily    lactulose 20 gram/30 mL solution Soln 15 g TID    metoprolol tartrate (LOPRESSOR) tablet 25 mg BID    nitroGLYCERIN SL tablet 0.4 mg Q5 Min PRN     ondansetron injection 4 mg Q6H PRN    pantoprazole EC tablet 40 mg Daily    promethazine (PHENERGAN) 6.25 mg in dextrose 5 % 50 mL IVPB Q6H PRN    sodium bicarbonate tablet 325 mg TID       Objective:     Vital Signs (Most Recent):  Temp: 98.5 °F (36.9 °C) (12/04/18 1134)  Pulse: 70 (12/04/18 1134)  Resp: 18 (12/04/18 1134)  BP: 138/64 (12/04/18 1134)  SpO2: 97 % (12/04/18 1134)  O2 Device (Oxygen Therapy): room air (12/04/18 0358) Vital Signs (24h Range):  Temp:  [97.7 °F (36.5 °C)-98.5 °F (36.9 °C)] 98.5 °F (36.9 °C)  Pulse:  [59-80] 70  Resp:  [10-19] 18  SpO2:  [95 %-100 %] 97 %  BP: (107-150)/(40-66) 138/64     Weight: 68.6 kg (151 lb 3.8 oz) (12/04/18 0558)  Body mass index is 25.17 kg/m².  Body surface area is 1.77 meters squared.    I/O last 3 completed shifts:  In: 566.8 [P.O.:248; Blood:318.8]  Out: 2750 [Urine:2750]    Physical Exam   Constitutional: She is oriented to person, place, and time. She appears well-developed and well-nourished.   Neck: No JVD present.   Cardiovascular: Normal rate, regular rhythm and normal heart sounds. Exam reveals no friction rub.   Pulmonary/Chest: Effort normal and breath sounds normal. No respiratory distress. She has no rales.   Abdominal: Soft. Bowel sounds are normal.   Musculoskeletal: She exhibits no edema.   Neurological: She is alert and oriented to person, place, and time.   Skin: Skin is warm and dry.   Psychiatric: She has a normal mood and affect. Her behavior is normal.   Nursing note and vitals reviewed.      Significant Labs: reviewed  BMP  Lab Results   Component Value Date     12/04/2018    K 4.7 12/04/2018     12/04/2018    CO2 20 (L) 12/04/2018    BUN 68 (H) 12/04/2018    CREATININE 4.0 (H) 12/04/2018    CALCIUM 8.4 (L) 12/04/2018    ANIONGAP 13 12/04/2018    ESTGFRAFRICA 12 (A) 12/04/2018    EGFRNONAA 11 (A) 12/04/2018     Lab Results   Component Value Date    WBC 6.52 12/04/2018    HGB 9.4 (L) 12/04/2018    HCT 28.1 (L) 12/04/2018     MCV 90 12/04/2018     12/04/2018         Assessment/Plan:         72 y/o female with advanced CKD presented with NSTEMI:                  Chronic kidney disease, stage V     Advanced CKD stage 5 at baseline   Cr stable, not worse, has not changed  Received contrast on 11/30/18 and 12/3/18     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  Slowly progressive CKD  Will need chronic HD, pt agreeable  Was seen by vascular last pm   Will follow with vascular as outpt  No indications for acute HD or starting HD sooner at this point     K normal  Acid base stable     HTN: BP controlled  Meds reviewed               * NSTEMI (non-ST elevated myocardial infarction)     Cardiac: CAD, NSTEMI  Acute decompensation of heart failure  S/p LHC on 11/30/18  S/p LAD stent on 12/3/18  Was turned down for CABG  Hemodynamically stable                   Plans and recommendations:  As discussed above  OK to d/c   Please arrange post hospital f/u in renal clinic in 2-3 weeks  Will need to f/u with vascular for vein mapping and AVF placement, pt was advised  Discussed with PCP           Angela Hanson MD  Nephrology  [facility]

## 2018-12-04 NOTE — DISCHARGE SUMMARY
[facility] Hospital Medicine  Discharge Summary      Patient Name: Avril Monzon  MRN: [id number]  Admission Date: 11/26/2018  Hospital Length of Stay: 8 days  Discharge Date and Time:  12/04/2018 12:18 PM  Attending Physician: Suhas Prakash, *   Discharging Provider: Iram Mcgill NP  Primary Care Provider: Rose Parks MD      HPI:   Ms. Monzon is a 72 yo female with a PMHx of HTN, HLD, DM II, CKD stage IV, anemia of CKD, and h/o CVA.  She presented to the ED with c/o SOB that has progressively worsened since yesterday.  Associated nonproductive cough and subjective fever.  No aggravating or alleviating factors.  Denies any CP, palpitations, diaphoresis, N/V, orthopnea, PND, edema, weight gain, ABD pain, diarrhea, dysuria, decreased UOP, hematuria, back pain, HA, AMS, lightheadedness, dizziness, syncope, weakness, numbness, or chills.  Per EMS, patient hypoxic on arrival to scene with O2 sat 80% on RA.  She was given Duoneb x 1 and placed on 4L NC with improved O2 sat of 94%.  Upon arrival to ED, patient's O2 sat 82% on 4L NC.  She was placed on BiPAP with increased O2 sat of 100%.  Work-up in ED resulted cr. 4.1, BUN 69, glucose 336, BNP 1613, troponin 4.417, lactic 2.7 > 1.5.  ABG with pH 7.324, pCO2 31.5, pO2 144, HCO3 16.4, BE -10 on BiPAP.  CXR showed pulmonary vascular congestion, pulmonary edema, and small bilateral pleural effusions.  EKG showed normal sinus rhythm with inferior ST depression, no previous to compare.  ASA 325mg, IV lopressor 5mg, and IV Lasix 120mg given per ED.  Case discussed with Cardiology in ED and IV heparin drip initiated.  Hospital Medicine called for admission.  Currently, patient appears comfortable in NAD.  Reports SOB improved on BiPAP.  Denies any CP.  No h/o CAD.  Denies having stress test in the past.  2D echo in 6/2018 showed LVEF of 60-65%, normal diastolic function, and no significant valvular abnormalities.      Procedure(s)  (LRB):  CATHETERIZATION, HEART, LEFT (Left)      Hospital Course:   Pt admitted to Inpatient status for NSTEMI with Troponin significantly elevated.  Cardiology consulted with Heparin infusion initiated.  EKG positive for ST depression.  Pt placed on BIPAP due to respiratory distress and later weaned to high flow oxygen.  Pulmonology consulted.  Chest xray verified pulmonary congestion with positive response to diuresis noted.  BNP elevated. Pt noted to renal failure with Creatinine of 4 and Nephrology consulted.  Baseline creatinine 2.5-3 noted.  Echo results showed EF 45% with diastolic dysfunction, PAP 69, +WMA, Pulm HTN, and mild to moderate MR.  LHC planned for tomorrow. H/H 8.2/25.3 with 1 unit of PRBCs transfused.  Procalcitonin of 0.66.  Blood culture results pending.  Pt denies acute distress at this time. As of 11/30 R/LHC scheduled today showed Lad prox 90% mid 50% lcx prox 50% rca diffusely diseased 70% normal filling pressures moderate pulmonary htn. Cardiology recommending CABG vs LAD arthrectomy stent. CVT consulted, not amenable to CABG due to severe calcification and high risk of morbidity and mortality. 12/3/18- Awaiting LHC today with intervention of LAD. Denies any chest pain or SOB. 12/4/18- S/p PCI to LAD. Tolerated procedure well. Renal function stable this morning. Plans inplace for OP Nephrology follow up and HD access implant. Patient to follow-up with PCP in 3 days for hospital follow-up of NSTEMI. Patient also to follow-up with cardiology, nephrology, and vascular surgery outpatient. Patient seen and examined on the date of discharge and found suitable for discharge.       Consults:   Consults (From admission, onward)        Status Ordering Provider     Inpatient consult to Cardiology  Once     Provider:  (Not yet assigned)    Completed MAHOGANY CABRERA     Inpatient consult to Cardiothoracic Surgery  Once     Provider:  Derek Moore MD    Acknowledged BRAD MYRICK      Inpatient consult to Nephrology  Once     Provider:  (Not yet assigned)    Completed ADITHYA PEACE     Inpatient consult to Pulmonology  Once     Provider:  (Not yet assigned)    Acknowledged MAHOGANY CABRERA     Inpatient consult to Respiratory Care  Once     Provider:  (Not yet assigned)    Acknowledged DESMOND SEWELL     Inpatient consult to Vascular Surgery  Once     Provider:  Bobby Reyes MD    Acknowledged AZRA SAUL          No new Assessment & Plan notes have been filed under this hospital service since the last note was generated.  Service: Hospital Medicine    Final Active Diagnoses:    Diagnosis Date Noted POA    PRINCIPAL PROBLEM:  NSTEMI (non-ST elevated myocardial infarction) [I21.4] 11/27/2018 Yes    Coronary artery disease involving native coronary artery of native heart [I25.10] 11/30/2018 Yes    Essential hypertension [I10] 11/27/2018 Yes     Chronic    Hyperlipidemia [E78.5] 11/27/2018 Yes     Chronic    GREGG on CKD, stage IV [N18.4] 11/27/2018 Yes    Acute hypoxemic respiratory failure secondary to acute decompensated HF [J96.01] 11/26/2018 Yes    Anemia in stage 4 chronic kidney disease [N18.4, D63.1] 05/18/2017 Yes     Chronic    Constipation [K59.00] 02/18/2014 Yes    Controlled type 2 diabetes mellitus with diabetic polyneuropathy, with long-term current use of insulin [E11.42, Z79.4]  Not Applicable     Chronic      Problems Resolved During this Admission:       Discharged Condition: stable    Disposition: Home or Self Care    Follow Up:  Follow-up Information     Rose Parks MD In 3 days.    Specialty:  Internal Medicine  Why:  hospital follow-up   Contact information:  [address] [facility] [address] [phone]             Kim Doe MD In 1 week.    Specialty:  Nephrology  Why:  hospital follow-up   Contact information:  [address]  [place] [address] [phone]             Roya Holman MD In 1 week.    Specialty:   Cardiology  Why:  hospital follow-up   Contact information:  [address]  [place] [id number] [phone]             Neel White IV, MD In 1 week.    Specialty:  Vascular Surgery  Why:  hospital follow-up   Contact information:  [address]  [address]  [facility]  [place] [address]  [phone]                 Patient Instructions:      Notify your health care provider if you experience any of the following:  severe uncontrolled pain     Notify your health care provider if you experience any of the following:  difficulty breathing or increased cough     Notify your health care provider if you experience any of the following:  persistent dizziness, light-headedness, or visual disturbances     Notify your health care provider if you experience any of the following:  increased confusion or weakness     Activity as tolerated       Significant Diagnostic Studies: Labs:   BMP:   Recent Labs   Lab 12/03/18  0418 12/04/18  0500   * 142*    139   K 3.6 4.7    106   CO2 25 20*   BUN 72* 68*   CREATININE 4.6* 4.0*   CALCIUM 8.7 8.4*   , CMP   Recent Labs   Lab 12/03/18  0418 12/04/18  0500    139   K 3.6 4.7    106   CO2 25 20*   * 142*   BUN 72* 68*   CREATININE 4.6* 4.0*   CALCIUM 8.7 8.4*   ALBUMIN 2.3*  --    ANIONGAP 11 13   ESTGFRAFRICA 10* 12*   EGFRNONAA 9* 11*   , CBC   Recent Labs   Lab 12/03/18  0418 12/04/18  0500   WBC 5.71 6.52   HGB 9.6* 9.4*   HCT 28.8* 28.1*    241    and Troponin   Recent Labs   Lab 11/27/18  1358   TROPONINI 11.162*       Pending Diagnostic Studies:     Procedure Component Value Units Date/Time    CBC auto differential [[id number]] Collected:  12/01/18 0441    Order Status:  Sent Lab Status:  In process Updated:  12/01/18 0441    Specimen:  Blood          Medications:  Reconciled Home Medications:      Medication List      START taking these medications    aspirin 81 MG EC tablet  Commonly known as:   ECOTRIN  Take 1 tablet (81 mg total) by mouth once daily.  Start taking on:  12/5/2018     clopidogrel 75 mg tablet  Commonly known as:  PLAVIX  Take 1 tablet (75 mg total) by mouth once daily.  Start taking on:  12/5/2018     isosorbide mononitrate 60 MG 24 hr tablet  Commonly known as:  IMDUR  Take 1 tablet (60 mg total) by mouth once daily.  Start taking on:  12/5/2018     metoprolol tartrate 25 MG tablet  Commonly known as:  LOPRESSOR  Take 1 tablet (25 mg total) by mouth 2 (two) times daily.     nitroGLYCERIN 0.4 MG SL tablet  Commonly known as:  NITROSTAT  Place 1 tablet (0.4 mg total) under the tongue every 5 (five) minutes as needed for Chest pain.        CHANGE how you take these medications    hydrALAZINE 25 MG tablet  Commonly known as:  APRESOLINE  Take 1 tablet (25 mg total) by mouth every 8 (eight) hours.  What changed:    · medication strength  · how much to take  · when to take this     insulin NPH-insulin regular (70/30) 100 unit/mL (70-30) injection  Commonly known as:  NovoLIN 70/30 U-100 Insulin  INJECT SUBCUTANEOUSLY 30 UNITS IN THE MORNING AND INJECT 25 UNITS IN THE EVENING  What changed:  additional instructions        CONTINUE taking these medications    atorvastatin 80 MG tablet  Commonly known as:  LIPITOR  Take 1 tablet (80 mg total) by mouth once daily.     blood sugar diagnostic Strp  1 strip by Misc.(Non-Drug; Combo Route) route 3 (three) times daily. relion ultima     calcifediol 30 mcg Cs24  Commonly known as:  RAYALDEE  Take 30 mcg by mouth once daily.     lancets Misc  Commonly known as:  ACCU-CHEK SOFTCLIX LANCETS  1 lancet by Misc.(Non-Drug; Combo Route) route 3 (three) times daily.     sodium bicarbonate 325 MG tablet  TAKE ONE TABLET BY MOUTH THREE TIMES DAILY        STOP taking these medications    amLODIPine 10 MG tablet  Commonly known as:  NORVASC     lisinopril 20 MG tablet  Commonly known as:  PRINIVILZESTRIL        ASK your doctor about these medications    torsemide 20  MG Tab  Commonly known as:  DEMADEX  Take 1 tablet (20 mg total) by mouth once daily.            Indwelling Lines/Drains at time of discharge:   Lines/Drains/Airways          None          Time spent on the discharge of patient: 49 minutes  Patient was seen and examined on the date of discharge and determined to be suitable for discharge.         Iram Mcgill NP  Department of Hospital Medicine  [facility]

## 2018-12-04 NOTE — SUBJECTIVE & OBJECTIVE
Interval History: Pt was seen and examined. No new events, no CP, no SOB.    Review of patient's allergies indicates:   Allergen Reactions    Codeine Swelling    Darvon [propoxyphene] Swelling     Current Facility-Administered Medications   Medication Frequency    0.9%  NaCl infusion (for blood administration) Q24H PRN    0.9%  NaCl infusion Continuous    acetaminophen tablet 650 mg Q6H PRN    aspirin EC tablet 81 mg Daily    atorvastatin tablet 80 mg Daily    calcifediol Cs24 30 mcg Daily    clopidogrel tablet 75 mg Daily    dextrose 50% injection 12.5 g PRN    glucagon (human recombinant) injection 1 mg PRN    glucose chewable tablet 16 g PRN    glucose chewable tablet 24 g PRN    hydrALAZINE tablet 25 mg Q8H    insulin aspart U-100 pen 1-10 Units QID (AC + HS) PRN    insulin detemir U-100 pen 15 Units QHS    isosorbide mononitrate 24 hr tablet 60 mg Daily    lactulose 20 gram/30 mL solution Soln 15 g TID    metoprolol tartrate (LOPRESSOR) tablet 25 mg BID    nitroGLYCERIN SL tablet 0.4 mg Q5 Min PRN    ondansetron injection 4 mg Q6H PRN    pantoprazole EC tablet 40 mg Daily    promethazine (PHENERGAN) 6.25 mg in dextrose 5 % 50 mL IVPB Q6H PRN    sodium bicarbonate tablet 325 mg TID       Objective:     Vital Signs (Most Recent):  Temp: 98.5 °F (36.9 °C) (12/04/18 1134)  Pulse: 70 (12/04/18 1134)  Resp: 18 (12/04/18 1134)  BP: 138/64 (12/04/18 1134)  SpO2: 97 % (12/04/18 1134)  O2 Device (Oxygen Therapy): room air (12/04/18 0358) Vital Signs (24h Range):  Temp:  [97.7 °F (36.5 °C)-98.5 °F (36.9 °C)] 98.5 °F (36.9 °C)  Pulse:  [59-80] 70  Resp:  [10-19] 18  SpO2:  [95 %-100 %] 97 %  BP: (107-150)/(40-66) 138/64     Weight: 68.6 kg (151 lb 3.8 oz) (12/04/18 0558)  Body mass index is 25.17 kg/m².  Body surface area is 1.77 meters squared.    I/O last 3 completed shifts:  In: 566.8 [P.O.:248; Blood:318.8]  Out: 2750 [Urine:2750]    Physical Exam   Constitutional: She is oriented to person,  place, and time. She appears well-developed and well-nourished.   Neck: No JVD present.   Cardiovascular: Normal rate, regular rhythm and normal heart sounds. Exam reveals no friction rub.   Pulmonary/Chest: Effort normal and breath sounds normal. No respiratory distress. She has no rales.   Abdominal: Soft. Bowel sounds are normal.   Musculoskeletal: She exhibits no edema.   Neurological: She is alert and oriented to person, place, and time.   Skin: Skin is warm and dry.   Psychiatric: She has a normal mood and affect. Her behavior is normal.   Nursing note and vitals reviewed.      Significant Labs: reviewed  BMP  Lab Results   Component Value Date     12/04/2018    K 4.7 12/04/2018     12/04/2018    CO2 20 (L) 12/04/2018    BUN 68 (H) 12/04/2018    CREATININE 4.0 (H) 12/04/2018    CALCIUM 8.4 (L) 12/04/2018    ANIONGAP 13 12/04/2018    ESTGFRAFRICA 12 (A) 12/04/2018    EGFRNONAA 11 (A) 12/04/2018     Lab Results   Component Value Date    WBC 6.52 12/04/2018    HGB 9.4 (L) 12/04/2018    HCT 28.1 (L) 12/04/2018    MCV 90 12/04/2018     12/04/2018

## 2018-12-04 NOTE — SUBJECTIVE & OBJECTIVE
Review of Systems   Constitution: Negative for diaphoresis, weakness, malaise/fatigue, weight gain and weight loss.   HENT: Negative for congestion and nosebleeds.    Cardiovascular: Negative for chest pain, claudication, cyanosis, dyspnea on exertion, irregular heartbeat, leg swelling, near-syncope, orthopnea, palpitations, paroxysmal nocturnal dyspnea and syncope.   Respiratory: Negative for cough, hemoptysis, shortness of breath, sleep disturbances due to breathing, snoring, sputum production and wheezing.    Hematologic/Lymphatic: Negative for bleeding problem. Does not bruise/bleed easily.   Skin: Negative for rash.   Musculoskeletal: Negative for arthritis, back pain, falls, joint pain, muscle cramps and muscle weakness.   Gastrointestinal: Negative for abdominal pain, constipation, diarrhea, heartburn, hematemesis, hematochezia, melena and nausea.   Genitourinary: Negative for dysuria, hematuria and nocturia.   Neurological: Negative for excessive daytime sleepiness, dizziness, headaches, light-headedness, loss of balance, numbness and vertigo.     Objective:     Vital Signs (Most Recent):  Temp: 98.4 °F (36.9 °C) (12/04/18 0800)  Pulse: 80 (12/04/18 0900)  Resp: 18 (12/04/18 0800)  BP: 132/65 (12/04/18 0800)  SpO2: 98 % (12/04/18 0800) Vital Signs (24h Range):  Temp:  [97.7 °F (36.5 °C)-98.5 °F (36.9 °C)] 98.4 °F (36.9 °C)  Pulse:  [57-80] 80  Resp:  [10-19] 18  SpO2:  [95 %-100 %] 98 %  BP: (107-152)/(40-66) 132/65     Weight: 68.6 kg (151 lb 3.8 oz)  Body mass index is 25.17 kg/m².     SpO2: 98 %  O2 Device (Oxygen Therapy): room air      Intake/Output Summary (Last 24 hours) at 12/4/2018 1025  Last data filed at 12/4/2018 0800  Gross per 24 hour   Intake 240 ml   Output 1150 ml   Net -910 ml       Lines/Drains/Airways     Peripheral Intravenous Line                 Peripheral IV - Single Lumen 12/01/18 0622 Right Forearm 3 days                Physical Exam   Constitutional: She is oriented to person,  place, and time. She appears well-developed and well-nourished.   Neck: Neck supple. No JVD present.   Cardiovascular: Normal rate, regular rhythm, normal heart sounds and normal pulses. Exam reveals no friction rub.   No murmur heard.  Pulmonary/Chest: Effort normal and breath sounds normal. No respiratory distress. She has no wheezes. She has no rales.   Abdominal: Soft. Bowel sounds are normal. She exhibits no distension.   Musculoskeletal: She exhibits no edema or tenderness.   Neurological: She is alert and oriented to person, place, and time.   Skin: Skin is warm and dry. No rash noted.   Right radial access site without redness, tenderness, swelling, or drainage. There is no active external bleeding or hematoma.   Psychiatric: She has a normal mood and affect. Her behavior is normal.   Nursing note and vitals reviewed.      Significant Labs:   All pertinent lab results from the last 24 hours have been reviewed. and   Recent Lab Results       12/04/18  0554   12/04/18  0500   12/03/18  2136   12/03/18  1706   12/03/18  1645        Anion Gap   13           Baso #   0.01           Basophil%   0.2           BUN, Bld   68           Calcium   8.4           Chloride   106           CO2   20           Coronary Stenosis               Coronary Stent               Creatinine   4.0           Differential Method   Automated           eGFR if    12           eGFR if non    11  Comment:  Calculation used to obtain the estimated glomerular filtration  rate (eGFR) is the CKD-EPI equation.              Eos #   0.0           Eosinophil%   0.0           Glucose   142           Gran # (ANC)   5.6           Gran%   85.7           Hematocrit   28.1           Hemoglobin   9.4           Lymph #   0.6           Lymph%   8.4           MCH   30.1           MCHC   33.5           MCV   90           Mono #   0.4           Mono%   5.7           MPV   9.3           Platelets   241           POC ACTIVATED  CLOTTING TIME K         169     POCT Glucose 164   160 86       Potassium   4.7           RBC   3.12           RDW   14.1           Sample         unknown     Sodium   139           WBC   6.52                            12/03/18  1527   12/03/18  1356   12/03/18  1335   12/03/18  1240   12/03/18  1154        Anion Gap               Baso #               Basophil%               BUN, Bld               Calcium               Chloride               CO2               Coronary Stenosis       >= 50%       Coronary Stent       Yes       Creatinine               Differential Method               eGFR if                eGFR if non                Eos #               Eosinophil%               Glucose               Gran # (ANC)               Gran%               Hematocrit               Hemoglobin               Lymph #               Lymph%               MCH               MCHC               MCV               Mono #               Mono%               MPV               Platelets               POC ACTIVATED CLOTTING TIME K 202 224 197         POCT Glucose         111     Potassium               RBC               RDW               Sample unknown unknown unknown         Sodium               WBC                                12/03/18  1109        Anion Gap       Baso #       Basophil%       BUN, Bld       Calcium       Chloride       CO2       Coronary Stenosis       Coronary Stent       Creatinine       Differential Method       eGFR if        eGFR if non        Eos #       Eosinophil%       Glucose       Gran # (ANC)       Gran%       Hematocrit       Hemoglobin       Lymph #       Lymph%       MCH       MCHC       MCV       Mono #       Mono%       MPV       Platelets       POC ACTIVATED CLOTTING TIME K       POCT Glucose 62     Potassium       RBC       RDW       Sample       Sodium       WBC             Significant Imaging: Echocardiogram:   2D echo with color flow  doppler:   Results for orders placed or performed during the hospital encounter of 11/26/18   2D echo with color flow doppler   Result Value Ref Range    QEF 45 55 - 65    Mitral Valve Regurgitation MILD TO MODERATE     Diastolic Dysfunction Yes (A)     Est. PA Systolic Pressure 69.15 (A)     Tricuspid Valve Regurgitation MILD

## 2018-12-04 NOTE — PLAN OF CARE
Problem: Patient Care Overview  Goal: Plan of Care Review  Outcome: Ongoing (interventions implemented as appropriate)  POC reviewed, verbalized understanding. Pt remained free from falls, fall precautions in place. Pt is NSR on monitor, 70s. VSS. No other c/o at this time. PIV intact, infusing tirofiban. Blood glucose monitored. Call bell and personal belongings within reach. Hourly rounding complete. Reminded to call for assistance. Will continue to monitor.

## 2018-12-04 NOTE — ASSESSMENT & PLAN NOTE
-Patient who presented in fulminant pulmonary edema/decompensated CHF in setting of MI  -Troponin 4.417>15.866>16.308  -Needs IV diuresis/optimization of medical therapy  -Continue ASA, amlodipine, statin, hydralazine, lopressor  -Add oral nitrates  -Continue heparin gtt for anticoagulation  -Will need R/LHC once optimized. Patient is at high risk of contrast-induced nephropathy/transition to dialysis. All risks, benefits, and treatment alternatives explained to patient in detail.  -Nephrology on board, appreciate assistance     11/28/18  -Stable overnight  -Troponin trending down  -Volume status improved  -No chest pain  -Continue ASA, amlodipine, statin, hydralazine, BB, imdur  -+Temp noted; Will plan on L/RHC for further evaluation once afebrile and compensated  -NPO after MN    11/29/18  -Improving  -Would benefit from additional day of IV diuresis  -Chest pain free  -Afebrile overnight  -Continue ASA, amlodipine, statin, hydralazine, BB, Imdur  -D/C heparin gtt, start Plavix 75 mg daily  -NPO after MN  -LHC tmw by Dr. Holman. All risks, benefits, and treatment alternatives explained in detail. All questions answered. Patient is aware she is at high risk of contrast induced nephropathy and will need dialysis.     12/1/18  -s/p LHC yesterday by Dr. Holman that showed 90% proximal LAD stenosis, 50% proximal LCX stenosis, and diffusely diseased RCA  -Turned down for CABG due to severe calcification and high risk of stroke/morbidity/mortality  -Will proceed with repeat LHC with intervention of LAD on Monday  -Continue ASA, Plavix, amlodipine, BB, hydralazine, Imdur    12/2/18  -Stable overnight  -Repeat LHC planned for tmw by Dr. Holman with intervention of LAD  -Patient aware of high risk of contrast induced nephropathy  -All risks, benefits, and treatment alternatives explained to patient in detail. All questions answered. She has agreed to proceed.  -NPO after MN; 50 cc/hr gentle fluids beginning at 4 AM  -Continue  ASA, Plavix, amlodipine, BB, hydralazine, Imdur    12/3/18  -Turned down for CABG  -Awaiting staged PCI procedure later today with Dr. Holman  -Continue current medical management for now   -Continue ASA, Plavix, amlodipine, BB, hydralazine, Imdur    12/4/18  -Successful PCI of LAD   -no recurrent chest pain or angina equivalent   -Continue ASA, Plavix, BB, Imdur, CCB  -Needs to follow up in Cardiology next week   -Needs to keep appt with Nephrology for CKD with plans for HD in the near future

## 2018-12-04 NOTE — PLAN OF CARE
CM spoke with patient and gave her lisiting of [facility] for [facility]. Patient chose [facility] . Preference form signed and copy givne to the patient and the original to the blue folder. CM sent referral via [employer]     12/04/18 0456   Final Note   Assessment Type Final Discharge Note   Anticipated Discharge Disposition Home-Health   Discharge plans and expectations educations in teach back method with documentation complete? Yes   Right Care Referral Info   Post Acute Recommendation Home-care  ([facility] )

## 2018-12-04 NOTE — NURSING TRANSFER
Nursing Transfer Note      12/3/2018     Transfer 208    Transfer via BED    Transfer with PORT MONITOR    Transported by GUEVARA ECHEVERRIA RN    Medicines sent: NONE    Chart send with patient: YES    Notified: FAMILY AT BEDSIDE    Patient reassessed at: 12/03/18  1845  BEDSIDE REPORT TO ASHER    Upon arrival to floor: TRANSFERRED TO ROOM. TRANSFERRED TO BED.  TELEMETRY MONITER ON.  IV FLUIDS ON PUMP AT PRESCRIBED RATE.  PROCEDURAL ACCESS SITE WITHOUT BLEEDING OR HEMATOMA.  DRESSING DRY AND INTACT.  CARE HANDED OFF TO...ASHER.......

## 2018-12-05 NOTE — NURSING
Discharged orders received and reviewed with family and pt. Pt instructed when to take each medication next dose.  IV removed, telemetry removed. Pt assisted with dressing by staff. Pt transported to [facility] via w/c by staff for family to transport home.

## 2018-12-06 ENCOUNTER — PATIENT OUTREACH (OUTPATIENT)
Dept: ADMINISTRATIVE | Facility: CLINIC | Age: 73
End: 2018-12-06

## 2018-12-06 NOTE — PATIENT INSTRUCTIONS
Discharge Instructions for Cardiac Catheterization  Cardiac catheterization is a procedure to look for blocked areas in the blood vessels that send blood to the heart. A thin, flexible tube (catheter) is put in a blood vessel in your groin or arm. The healthcare provider injects contrast fluid into your blood, which then flows to your heart. X-rays pictures are taken of your heart. Your provider will review the results with you. Be sure to ask any questions you have before you leave. This sheet will help you take care of yourself at home.  Home care  · Only do light and easy activities for the next 2 to 3 days. Ask for help with chores and errands while you recover. Have someone drive you to your appointments.  · Don't lift anything heavy for a while. Your healthcare team will tell you when it's safe to lift again.  · Ask your healthcare team when you can expect to return to work. Unless your job involves lifting, you may be able to return to your normal activities within a couple of days.  · Take your medicines as directed. Don't skip doses.  · Drink 6 to 8 glasses of water a day. This is to help flush the contrast dye out of your body. Call your healthcare team if your urine has any change in color.  · Take your temperature each day for 7 days. If you feel cold and clammy or start sweating, take your temperature right away and call your healthcare team.  · Check your incisions every day for signs of infection. These include redness, swelling, and drainage. It is normal to have a small bruise or bump where the catheter was inserted. A bruise that is getting larger is not normal and should be reported to your healthcare team. If you see blood forming in the incision, call your healthcare team. Go to the emergency department if you have uncontrolled bleeding from the artery site. This is especially true if you take medicines that make it difficult for your blood to clot. Examples are aspirin, clopidogrel, and  warfarin.  · Eat a healthy diet. Make sure it is low in fat, salt, and cholesterol. Ask your healthcare team for diet information.  · Stop smoking. Enroll in a stop-smoking program or ask your healthcare team for help. Stop-smoking programs can be life saving.  · Exercise as your healthcare team tells you to. Your healthcare team may recommend you start a cardiac rehabilitation program. Cardiac rehab is an exercise program in which trained healthcare staff watch your progress and stress on your heart while you exercise. Ask your team how to enroll.  · Don't swim or take baths until your healthcare team says its OK. You can shower the day after the procedure. Keep the site clean and dry. This keeps the incision from getting wet and infected until the skin and artery can heal.  Follow-up care  · Make a follow-up appointment as advised by our staff. It's common to have a follow-up appointment 2 to 4 weeks after an angioplasty or coronary stent procedure.  · Make a yearly appointment, too. This is to make sure you are still doing well and not having any new symptoms.  · Don't wait for a follow-up appointment if your medicines aren't working or you are having heart-related symptoms.  When to seek medical care  Call your healthcare provider right away if you have any of the following:  · Chest pain  · Constant or increasing pain or numbness in your leg  · Fever of 100.4°F (38.0°C) or higher, or as directed by your healthcare provider  · Symptoms of infection. These include redness, swelling, drainage, or warmth at the incision site.  · Shortness of breath  · A leg that feels cold or appears blue  · Bleeding, bruising, or a lot of swelling where the catheter was inserted  · Blood in your urine  · Black or tarry stools  · Any unusual bleeding   Date Last Reviewed: 10/1/2016  © 7606-1519 [employer]. [address] [place] [address], [place] [address]. All rights reserved. This information is not intended as a  substitute for professional medical care. Always follow your healthcare professional's instructions.

## 2018-12-10 ENCOUNTER — HOSPITAL ENCOUNTER (OUTPATIENT)
Dept: RADIOLOGY | Facility: HOSPITAL | Age: 73
Discharge: HOME | End: 2018-12-10
Attending: PHYSICIAN ASSISTANT
Payer: MEDICARE

## 2018-12-10 ENCOUNTER — CLINICAL SUPPORT (OUTPATIENT)
Dept: CARDIOLOGY | Facility: CLINIC | Age: 73
End: 2018-12-10
Payer: MEDICARE

## 2018-12-10 ENCOUNTER — OFFICE VISIT (OUTPATIENT)
Dept: CARDIOLOGY | Facility: CLINIC | Age: 73
End: 2018-12-10
Payer: MEDICARE

## 2018-12-10 VITALS
HEIGHT: 65 IN | DIASTOLIC BLOOD PRESSURE: 70 MMHG | HEART RATE: 66 BPM | SYSTOLIC BLOOD PRESSURE: 170 MMHG | BODY MASS INDEX: 25.27 KG/M2 | WEIGHT: 151.69 LBS

## 2018-12-10 DIAGNOSIS — E78.5 HYPERLIPIDEMIA ASSOCIATED WITH TYPE 2 DIABETES MELLITUS: ICD-10-CM

## 2018-12-10 DIAGNOSIS — J96.01 ACUTE HYPOXEMIC RESPIRATORY FAILURE: ICD-10-CM

## 2018-12-10 DIAGNOSIS — I25.5 ISCHEMIC CARDIOMYOPATHY: ICD-10-CM

## 2018-12-10 DIAGNOSIS — I25.10 CORONARY ARTERY DISEASE INVOLVING NATIVE CORONARY ARTERY OF NATIVE HEART WITHOUT ANGINA PECTORIS: Primary | ICD-10-CM

## 2018-12-10 DIAGNOSIS — R05.9 COUGH: ICD-10-CM

## 2018-12-10 DIAGNOSIS — I25.10 CORONARY ARTERY DISEASE INVOLVING NATIVE CORONARY ARTERY OF NATIVE HEART WITHOUT ANGINA PECTORIS: ICD-10-CM

## 2018-12-10 DIAGNOSIS — I21.4 NSTEMI (NON-ST ELEVATED MYOCARDIAL INFARCTION): ICD-10-CM

## 2018-12-10 DIAGNOSIS — Z79.4 TYPE 2 DIABETES MELLITUS WITH STABLE PROLIFERATIVE RETINOPATHY OF BOTH EYES, WITH LONG-TERM CURRENT USE OF INSULIN: ICD-10-CM

## 2018-12-10 DIAGNOSIS — N18.4 CHRONIC KIDNEY DISEASE (CKD) STAGE G4/A3, SEVERELY DECREASED GLOMERULAR FILTRATION RATE (GFR) BETWEEN 15-29 ML/MIN/1.73 SQUARE METER AND ALBUMINURIA CREATININE RATIO GREATER THAN 300 MG/G: ICD-10-CM

## 2018-12-10 DIAGNOSIS — Z86.73 HISTORY OF CVA (CEREBROVASCULAR ACCIDENT): ICD-10-CM

## 2018-12-10 DIAGNOSIS — I27.20 PULMONARY HYPERTENSION: ICD-10-CM

## 2018-12-10 DIAGNOSIS — E78.5 HYPERLIPIDEMIA, UNSPECIFIED HYPERLIPIDEMIA TYPE: Chronic | ICD-10-CM

## 2018-12-10 DIAGNOSIS — E11.3553 TYPE 2 DIABETES MELLITUS WITH STABLE PROLIFERATIVE RETINOPATHY OF BOTH EYES, WITH LONG-TERM CURRENT USE OF INSULIN: ICD-10-CM

## 2018-12-10 DIAGNOSIS — I77.9 BILATERAL CAROTID ARTERY DISEASE, UNSPECIFIED TYPE: ICD-10-CM

## 2018-12-10 DIAGNOSIS — E11.69 HYPERLIPIDEMIA ASSOCIATED WITH TYPE 2 DIABETES MELLITUS: ICD-10-CM

## 2018-12-10 DIAGNOSIS — I10 ESSENTIAL HYPERTENSION: Chronic | ICD-10-CM

## 2018-12-10 PROCEDURE — 3044F HG A1C LEVEL LT 7.0%: CPT | Mod: CPTII,S$GLB,, | Performed by: PHYSICIAN ASSISTANT

## 2018-12-10 PROCEDURE — 99499 UNLISTED E&M SERVICE: CPT | Mod: S$GLB,,, | Performed by: PHYSICIAN ASSISTANT

## 2018-12-10 PROCEDURE — 71046 X-RAY EXAM CHEST 2 VIEWS: CPT | Mod: TC

## 2018-12-10 PROCEDURE — 1101F PT FALLS ASSESS-DOCD LE1/YR: CPT | Mod: CPTII,S$GLB,, | Performed by: PHYSICIAN ASSISTANT

## 2018-12-10 PROCEDURE — 3077F SYST BP >= 140 MM HG: CPT | Mod: CPTII,S$GLB,, | Performed by: PHYSICIAN ASSISTANT

## 2018-12-10 PROCEDURE — 99214 OFFICE O/P EST MOD 30 MIN: CPT | Mod: S$GLB,,, | Performed by: PHYSICIAN ASSISTANT

## 2018-12-10 PROCEDURE — 71046 X-RAY EXAM CHEST 2 VIEWS: CPT | Mod: 26,,, | Performed by: RADIOLOGY

## 2018-12-10 PROCEDURE — 93000 ELECTROCARDIOGRAM COMPLETE: CPT | Mod: S$GLB,,, | Performed by: INTERNAL MEDICINE

## 2018-12-10 PROCEDURE — 3078F DIAST BP <80 MM HG: CPT | Mod: CPTII,S$GLB,, | Performed by: PHYSICIAN ASSISTANT

## 2018-12-10 PROCEDURE — 99999 PR PBB SHADOW E&M-EST. PATIENT-LVL IV: CPT | Mod: PBBFAC,,, | Performed by: PHYSICIAN ASSISTANT

## 2018-12-10 RX ORDER — ATORVASTATIN CALCIUM 80 MG/1
80 TABLET, FILM COATED ORAL DAILY
Qty: 30 TABLET | Refills: 11 | Status: ON HOLD | OUTPATIENT
Start: 2018-12-10 | End: 2021-12-07 | Stop reason: SDUPTHER

## 2018-12-10 RX ORDER — ATORVASTATIN CALCIUM 80 MG/1
TABLET, FILM COATED ORAL
Qty: 90 TABLET | Refills: 3 | Status: SHIPPED | OUTPATIENT
Start: 2018-12-10 | End: 2018-12-12 | Stop reason: SDUPTHER

## 2018-12-10 NOTE — PATIENT INSTRUCTIONS
Discharge Instructions: Eating a Low-Salt Diet  Your health care provider has prescribed a low-salt diet for you. Most people with heart problems need to eat less salt, which is full of sodium. Too much sodium is linked to high blood pressure, which is linked to a greater risk of heart disease, stroke, blindness, and kidney problems.  Home care    Learn ways to cut back on salt (sodium):  · Eat less frozen, canned, dried, packaged, and fast foods. These often contain high amounts of sodium.  · Season foods with herbs instead of salt when you cook.  · Season with flavorings such as pepper, lemon, garlic, and onion.  · Dont add salt to your food at the table.  · Sprinkle salt-free herbal blends on meats and vegetables.  Learn to read food labels carefully:  · Look for the total amount of sodium per serving.  · Look for foods labeled low sodium, reduced sodium, or no added salt.  · Beware. Salt goes by many names. Cut down on foods with these words (all forms of salt) listed as ingredients:  ¨ Salt  ¨ Sodium  ¨ Soy sauce  ¨ Baking soda  ¨ Baking powder  ¨ MSG  ¨ Monosodium  ¨ Na (the chemical symbol for sodium)  Other ideas:  · Use more fresh food. Buy more fruits and vegetables.  · Select lean meats, fish, and poultry.  · Find a cookbook with low-salt recipes. Youll find ideas for tasty meals that are healthy for your heart.  ·   When eating out, ask questions about the menu. Tell the  you're on a low-salt diet.  ¨ If you order fish, chicken, beef, or pork, ask the  to have it broiled, baked, poached, or grilled without salt, butter, or breading.  ¨ Choose plain steamed rice, boiled noodles, and baked or boiled potatoes. Top potatoes with chives and a little sour cream instead of butter.  · Avoid antacids that are high in salt. Check the label before you buy.  Follow-up  Make a follow-up appointment with a nutritionist as directed by our staff.  Date Last Reviewed: 6/20/2015  © 9308-7970 [employer]. [address] [place] [address]. All rights reserved. This information is not intended as a substitute for professional medical care. Always follow your healthcare professional's instructions.        Low-Salt Diet  This diet removes foods that are high in salt. It also limits the amount of salt you use when cooking. It is most often used for people with high blood pressure, edema (fluid retention), and kidney, liver, or heart disease.  Table salt contains the mineral sodium. Your body needs sodium to work normally. But too much sodium can make your health problems worse. Your healthcare provider is recommending a low-salt (also called low-sodium) diet for you. Your total daily allowance of salt is 1,500 to 2,300 milligrams (mg). It is less than 1 teaspoon of table salt. This means you can have only about 500 to 700 mg of sodium at each meal. People with certain health problems should limit salt intake to the lower end of the recommended range.    When you cook, dont add much salt. If you can cook without using salt, even better. Dont add salt to your food at the table.  When shopping, read food labels. Salt is often called sodium on the label. Choose foods that are salt-free, low salt, or very low salt. Note that foods with reduced salt may not lower your salt intake enough.    Beans, potatoes, and pasta  Ok: Dry beans, split peas, lentils, potatoes, rice, macaroni, pasta, spaghetti without added salt  Avoid: Potato chips, tortilla chips, and similar products  Breads and cereals  Ok: Low-sodium breads, rolls, cereals, and cakes; low-salt crackers, matzo crackers  Avoid: Salted crackers, pretzels, popcorn, Niuean toast, pancakes, muffins  Dairy  Ok: Milk, chocolate milk, hot chocolate mix, low-salt cheeses, and yogurt  Avoid: Processed cheese and cheese spreads; Roquefort, Camembert, and cottage cheese; buttermilk, instant breakfast drink  Desserts  Ok: Ice cream, frozen yogurt, juice bars,  gelatin, cookies and pies, sugar, honey, jelly, hard candy  Avoid: Most pies, cakes and cookies prepared or processed with salt; instant pudding  Drinks  Ok: Tea, coffee, fizzy (carbonated) drinks, juices  Avoid: Flavored coffees, electrolyte replacement drinks, sports drinks  Meats  Ok: All fresh meat, fish, poultry, low-salt tuna, eggs, egg substitute  Avoid: Smoked, pickled, brine-cured, or salted meats and fish. This includes israel, chipped beef, corned beef, hot dogs, deli meats, ham, kosher meats, salt pork, sausage, canned tuna, salted codfish, smoked salmon, herring, sardines, or anchovies.  Seasonings and spices  Ok: Most seasonings are okay. Good substitutes for salt include: fresh herb blends, hot sauce, lemon, garlic, santiago, vinegar, dry mustard, parsley, cilantro, horseradish, tomato paste, regular margarine, mayonnaise, unsalted butter, cream cheese, vegetable oil, cream, low-salt salad dressing and gravy.  Avoid: Regular ketchup, relishes, pickles, soy sauce, teriyaki sauce, Worcestershire sauce, BBQ sauce, tartar sauce, meat tenderizer, chili sauce, regular gravy, regular salad dressing, salted butter  Soups  Ok: Low-salt soups and broths made with allowed foods  Avoid: Bouillon cubes, soups with smoked or salted meats, regular soup and broth  Vegetables  Ok: Most vegetables are okay; also low-salt tomato and vegetable juices  Avoid: Sauerkraut and other brine-soaked vegetables; pickles and other pickled vegetables; tomato juice, olives  Date Last Reviewed: 8/1/2016  © 2045-2540 [employer]. [address] [place] [address], [place] [address]. All rights reserved. This information is not intended as a substitute for professional medical care. Always follow your healthcare professional's instructions.        Low-Salt Choices  Eating salt (sodium) can make your body retain too much water. Excess water makes your heart work harder. Canned, packaged, and frozen foods are easy to prepare, but they are  often high in sodium. Here are some ideas for low-salt foods you can easily prepare yourself.    For breakfast  · Fruit or 100% fruit juice  · Whole-wheat bread or an English muffin. Compare sodium content on labels.  · Low-fat milk or yogurt  · Unsalted eggs  · Shredded wheat  · Corn tortillas  · Unsalted steamed rice  · Regular (not instant) hot cereal, made without salt  Stay away from:  · Sausage, israel, and ham  · Flour tortillas  · Packaged muffins, pancakes, and biscuits  · Instant hot cereals  · Cottage cheese  For lunch and dinner  · Fresh fish, chicken, turkey, or meat--baked, broiled, or roasted without salt  · Dry beans, cooked without salt  · Tofu, stir-fried without salt  · Unsalted fresh fruit and vegetables, or frozen or canned fruit and vegetables with no added salt  Stay away from:  · Lunch or deli meat that is cured or smoked  · Cheese  · Tomato juice and catsup  · Canned vegetables, soups, and fish not labeled as no-salt-added or reduced sodium  · Packaged gravies and sauces  · Olives, pickles, and relish  · Bottled salad dressings  For snacks and desserts  · Yogurt  · Unsalted, air popped popcorn  · Unsalted nuts or seeds  Stay away from:  · Pies and cakes  · Packaged dessert mixes  · Pizza  · Canned and packaged puddings  · Pretzels, chips, crackers, and nuts--unless the label says unsalted  Date Last Reviewed: 6/17/2015  © 3240-4780 [employer]. [address] [place] [address]. All rights reserved. This information is not intended as a substitute for professional medical care. Always follow your healthcare professional's instructions.        Tips for Using Less Salt    Most people with heart problems need to eat less salt (sodium). Reducing the amount of salt you eat may help control your blood pressure. The higher your blood pressure, the greater your risk for heart disease, stroke, blindness, and kidney problems.  At the store  · Make low-salt choices by reading  labels carefully. Look for the total amount of sodium per serving.  · Use more fresh food. Buy more fruits and vegetables. Select lean meats, fish, and poultry.  · Use fewer frozen, canned, and packaged foods which often contain a lot of sodium.  · Use plain frozen vegetables without sauces or toppings. These products are often low- or no-sodium.  · Opt for reduced-sodium or no-salt-added versions of canned vegetables and soups.  In the kitchen  · Don't add salt to food when you're cooking. Season with flavorings such as onion, garlic, pepper, salt-free herbal blends, and lemon or lime juice.  · Use a cookbook containing low-salt recipes. It can give you ideas for tasty meals that are healthy for your heart.  · Sprinkle salt-free herbal blends on vegetables and meat.  · Drain and rinse canned foods, such as canned beans and vegetables, before cooking or eating.  Eating out  · Tell the  you're on a low-salt diet. Ask questions about the menu.  · Order fish, chicken, and meat broiled, baked, poached, or grilled without salt, butter, or breading.  · Use lemon, pepper, and salt-free herb mixes to add flavor.  · Choose plain steamed rice, boiled noodles, and baked or boiled potatoes. Top potatoes with chives and a little sour cream.     Beware! Salt goes by many other names. Limit foods with these words listed as ingredients: salt, sodium, soy sauce, baking soda, baking powder, MSG, monosodium, Na (the chemical symbol for sodium). Some antacids are also high in salt.   Date Last Reviewed: 6/19/2015  © 9971-6209 [employer]. [address] [place] [address], [place] [address]. All rights reserved. This information is not intended as a substitute for professional medical care. Always follow your healthcare professional's instructions.

## 2018-12-10 NOTE — PROGRESS NOTES
Subjective:    Patient ID:  Avril Monzon is a 73 y.o. female who presents for follow-up of hospital follow-up    HPI   Ms. Monzon is a 73 year old female patient with a PMHx of HTN, hyperlipidemia, DM type II, former tobacco abuse, CKD, anemia, and CVA who presents today for hospital follow-up. Patient recently hospitalized at [facility] with acute decompensated CHF/NSTEMI. She subsequently underwent LHC which showed 90% proximal LAD stenosis, 50% proximal LCX stenosis, and diffusely diseased RCA. She was turned down for CABG due to heavily calcified vessels/high risk of CVA and later underwent repeat LHC with successful intervention of her LAD. She returns today and states she is doing well. Does admit to some fatigue and ALVARADO, both of which have improved since her hospitalization. Denies any ministerio chest pain or tightness. No PND, orthopnea, or lower extremity edema. Does admit to a dry cough and abdominal bloating. Weight stable at home at 250 lbs. Patient reports compliance with her medications. No groin complaints. BP elevated but she took meds shortly PTA. EKG today shows SR with sinus arrhythmia, no acute changes.     Review of Systems   Constitution: Positive for malaise/fatigue. Negative for chills, decreased appetite, fever and weakness.   HENT: Negative for congestion, hoarse voice and sore throat.    Eyes: Negative for blurred vision and discharge.   Cardiovascular: Positive for dyspnea on exertion. Negative for chest pain, claudication, cyanosis, irregular heartbeat, leg swelling, near-syncope, orthopnea, palpitations and paroxysmal nocturnal dyspnea.   Respiratory: Positive for cough. Negative for hemoptysis, shortness of breath, snoring, sputum production and wheezing.    Endocrine: Negative for cold intolerance and heat intolerance.   Hematologic/Lymphatic: Negative for bleeding problem. Does not bruise/bleed easily.   Skin: Negative for rash.   Musculoskeletal: Negative for arthritis, back pain, joint  "pain, joint swelling, muscle cramps, muscle weakness and myalgias.   Gastrointestinal: Negative for abdominal pain, constipation, diarrhea, heartburn, melena and nausea.   Genitourinary: Negative for hematuria.   Neurological: Positive for light-headedness. Negative for dizziness, focal weakness, headaches, loss of balance, numbness, paresthesias and seizures.   Psychiatric/Behavioral: Negative for memory loss. The patient does not have insomnia.    Allergic/Immunologic: Negative for hives.       BP (!) 170/70 (BP Location: Left arm, Patient Position: Sitting)   Pulse 66   Ht 5' 5" (1.651 m)   Wt 68.8 kg (151 lb 10.8 oz)   BMI 25.24 kg/m²     Objective:    Physical Exam   Constitutional: She is oriented to person, place, and time. She appears well-developed and well-nourished. No distress.   HENT:   Head: Normocephalic and atraumatic.   Eyes: Pupils are equal, round, and reactive to light. Right eye exhibits no discharge. Left eye exhibits no discharge.   Neck: Neck supple. JVD present. No thyromegaly present.   Cardiovascular: Normal rate, regular rhythm, S1 normal and S2 normal.   Murmur heard.  High-pitched blowing holosystolic murmur is present at the apex.  Pulmonary/Chest: Effort normal and breath sounds normal. No respiratory distress.   Diminished BS at bases   Abdominal: Soft. She exhibits no distension. There is no tenderness.   Musculoskeletal: She exhibits edema (trace BLE).   Neurological: She is alert and oriented to person, place, and time.   Skin: Skin is warm and dry. She is not diaphoretic. No erythema.   Groin access site with mild bruising; no drainage or erythema   Psychiatric: She has a normal mood and affect. Her behavior is normal. Thought content normal.   Nursing note and vitals reviewed.      2D Echo CONCLUSIONS     1 - Wall motion abnormalities.     2 - Mildly depressed left ventricular systolic function (EF 45-50%).     3 - Impaired LV relaxation, normal LAP (grade 1 diastolic " dysfunction).     4 - Normal right ventricular systolic function .     5 - Pulmonary hypertension. The estimated PA systolic pressure is 69 mmHg.     6 - Mild to moderate mitral regurgitation.     7 - Mild tricuspid regurgitation.     8 - Intermediate central venous pressure.     9 - Right pleural effusion    Angiographic Results     Diagnostic:          Patient has a right dominant coronary artery.        - Left Main Coronary Artery:             The LM is normal. There is GRANT 3 flow.     - Left Anterior Descending Artery:             The proximal LAD has a 95% stenosis. There is GRANT 3 flow.                     Lesion Details:   The length is 10mm, eccentric shape, moderate proximal tortuosity, segment angulation of 45-90 degrees, irregular contour, moderate to heavy calcification.     - Left Circumflex Artery:             The ostial LCX has a 50% stenosis. There is GRANT 3 flow. THE VESSEL IS TRTUOUS AND CALCIFIED. THERE WAS SUPERIMPOSED SPASM THAT HAS RESOLVED.     - Right Coronary Artery:             The RCA was not studied.     - Left External Iliac Artery:             The left EIA is normal.     - Left Common Femoral Artery:             The left CFA is normal.     - Left Superficial Femoral Artery:             The left SFA is normal.     - Left Profunda Femoral Artery:             The left PFA is normal.      Intervention          Proximal LAD:              The lesion was successfully intervened. Post-stenosis of 0% and post-GRANT 3 flow. The vessel was accessed natively.  The following items were used: Diamondback Coronary, Blln Quantum Seymour 2.0 X 30 and Stent Resolute Rx 3.50x15 (ANTHONY).  Assessment:       1. Coronary artery disease involving native coronary artery of native heart without angina pectoris    2. Hyperlipidemia, unspecified hyperlipidemia type    3. History of CVA (cerebrovascular accident)    4. 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, with long-term current  use of insulin    5. Acute hypoxemic respiratory failure secondary to acute decompensated HF    6. Pulmonary hypertension    7. Bilateral carotid artery disease, unspecified type    8. Essential hypertension    9. Hyperlipidemia associated with type 2 diabetes mellitus    10. NSTEMI (non-ST elevated myocardial infarction)    11. Chronic kidney disease (CKD) stage G4/A3, severely decreased glomerular filtration rate (GFR) between 15-29 mL/min/1.73 square meter and albuminuria creatinine ratio greater than 300 mg/g      Patient presents for f/u. Doing clinically well. Check CXR BMP, BNP today given persistent cough. BP elevated but took meds shortly PTA. Will have HH evaluate later this week and increase hydralazine if still elevated. Will need pulmonary f/u to rule out LILIANA, can discuss at f/u with Dr. Holman. Continue same meds for now. Dietary restrictions reinforced   Plan:   -CXR, BMP, BNP today with phone review  -Continue same meds for now  -BP check through HH later this week  -Cardiac low salt diet, handouts given  -RTC 2 weeks with Dr. Holman  -Needs pulmonary referral can discuss upon f/u      Chart reviewed. Dr. Holman agrees with plan as outlined above.

## 2018-12-11 ENCOUNTER — TELEPHONE (OUTPATIENT)
Dept: CARDIOLOGY | Facility: CLINIC | Age: 73
End: 2018-12-11

## 2018-12-11 NOTE — TELEPHONE ENCOUNTER
Please phone patient. Labs reviewed. BNP still elevated but improved from hospitalization. BMP reviewed, creatinine stable at 3.8.    CXR shows some mild congestion. Would advise she watch her salt intake and continue same dose of Torsemide for now.    Keep f/u with Dr. Holman

## 2018-12-11 NOTE — TELEPHONE ENCOUNTER
Called and informed pt of lab results and medication instructions.pt verbalized understanding and repeated instructions back. All questions were answered and pt stated she will keep f/u appointment with

## 2018-12-12 ENCOUNTER — TELEPHONE (OUTPATIENT)
Dept: ADMINISTRATIVE | Facility: CLINIC | Age: 73
End: 2018-12-12

## 2018-12-12 ENCOUNTER — OFFICE VISIT (OUTPATIENT)
Dept: INTERNAL MEDICINE | Facility: CLINIC | Age: 73
End: 2018-12-12
Payer: MEDICARE

## 2018-12-12 ENCOUNTER — HOSPITAL ENCOUNTER (OUTPATIENT)
Dept: RADIOLOGY | Facility: HOSPITAL | Age: 73
Discharge: HOME | End: 2018-12-12
Attending: INTERNAL MEDICINE
Payer: MEDICARE

## 2018-12-12 VITALS
SYSTOLIC BLOOD PRESSURE: 158 MMHG | TEMPERATURE: 97 F | BODY MASS INDEX: 25.2 KG/M2 | HEIGHT: 65 IN | WEIGHT: 151.25 LBS | OXYGEN SATURATION: 96 % | HEART RATE: 70 BPM | DIASTOLIC BLOOD PRESSURE: 72 MMHG

## 2018-12-12 DIAGNOSIS — E11.22 DIABETES MELLITUS WITH STAGE 4 CHRONIC KIDNEY DISEASE GFR 15-29: ICD-10-CM

## 2018-12-12 DIAGNOSIS — I21.4 NON-ST ELEVATION MI (NSTEMI): ICD-10-CM

## 2018-12-12 DIAGNOSIS — I50.23 ACUTE ON CHRONIC SYSTOLIC CONGESTIVE HEART FAILURE: ICD-10-CM

## 2018-12-12 DIAGNOSIS — E11.42 CONTROLLED TYPE 2 DIABETES MELLITUS WITH DIABETIC POLYNEUROPATHY, WITH LONG-TERM CURRENT USE OF INSULIN: Chronic | ICD-10-CM

## 2018-12-12 DIAGNOSIS — E11.69 HYPERLIPIDEMIA ASSOCIATED WITH TYPE 2 DIABETES MELLITUS: ICD-10-CM

## 2018-12-12 DIAGNOSIS — Z00.00 ENCOUNTER FOR PREVENTIVE HEALTH EXAMINATION: ICD-10-CM

## 2018-12-12 DIAGNOSIS — Z12.31 ENCOUNTER FOR SCREENING MAMMOGRAM FOR MALIGNANT NEOPLASM OF BREAST: ICD-10-CM

## 2018-12-12 DIAGNOSIS — I25.111 CORONARY ARTERY DISEASE INVOLVING NATIVE CORONARY ARTERY OF NATIVE HEART WITH ANGINA PECTORIS WITH DOCUMENTED SPASM: Primary | ICD-10-CM

## 2018-12-12 DIAGNOSIS — E78.5 HYPERLIPIDEMIA ASSOCIATED WITH TYPE 2 DIABETES MELLITUS: ICD-10-CM

## 2018-12-12 DIAGNOSIS — Z79.4 CONTROLLED TYPE 2 DIABETES MELLITUS WITH DIABETIC POLYNEUROPATHY, WITH LONG-TERM CURRENT USE OF INSULIN: Chronic | ICD-10-CM

## 2018-12-12 DIAGNOSIS — I15.2 HYPERTENSION ASSOCIATED WITH DIABETES: Chronic | ICD-10-CM

## 2018-12-12 DIAGNOSIS — E11.59 HYPERTENSION ASSOCIATED WITH DIABETES: Chronic | ICD-10-CM

## 2018-12-12 DIAGNOSIS — N18.4 DIABETES MELLITUS WITH STAGE 4 CHRONIC KIDNEY DISEASE GFR 15-29: ICD-10-CM

## 2018-12-12 PROCEDURE — 77063 BREAST TOMOSYNTHESIS BI: CPT | Mod: 26,,, | Performed by: RADIOLOGY

## 2018-12-12 PROCEDURE — 99499 UNLISTED E&M SERVICE: CPT | Mod: S$GLB,,, | Performed by: INTERNAL MEDICINE

## 2018-12-12 PROCEDURE — 99999 PR PBB SHADOW E&M-EST. PATIENT-LVL IV: CPT | Mod: PBBFAC,,, | Performed by: INTERNAL MEDICINE

## 2018-12-12 PROCEDURE — 99496 TRANSJ CARE MGMT HIGH F2F 7D: CPT | Mod: S$GLB,,, | Performed by: INTERNAL MEDICINE

## 2018-12-12 PROCEDURE — 77067 SCR MAMMO BI INCL CAD: CPT | Mod: 26,,, | Performed by: RADIOLOGY

## 2018-12-12 PROCEDURE — 77063 BREAST TOMOSYNTHESIS BI: CPT | Mod: TC,PO

## 2018-12-12 NOTE — TELEPHONE ENCOUNTER
Home Health SOC 12/06/2018 - 02/03/2019 with [facility] ([facility]onda Vazquez) - Dr. Suhas Prakash. Patient received SN and PT services.

## 2018-12-12 NOTE — PROGRESS NOTES
Transitional Care Note  Subjective:      Patient ID: Avril Monzon is a 73 y.o. female.    Chief Complaint: No chief complaint on file.    Family and/or Caretaker present at visit?  {YES:[id number]}.  Diagnostic tests reviewed/disposition: {[facility] DIAGNOSTIC TESTS:23330}.  Disease/illness education: ***  Home health/community services discussion/referrals: {[facility] HOME HEALTH:58131}.   Establishment or re-establishment of referral orders for community resources: {[facility] ESTABLISHMENT:23051}.   Discussion with other health care providers: {[facility] DISCUSSION:84225}.       HPI  Here for follow up of medical problems and hospital follow up for NSTEMI.  Saw Cardiology 2 days ago, and has future follow up scheduled.    Discharged from hospital 12/4/18 for:  Hospital Course:   Pt admitted to Inpatient status for NSTEMI with Troponin significantly elevated.  Cardiology consulted with Heparin infusion initiated.  EKG positive for ST depression.  Pt placed on BIPAP due to respiratory distress and later weaned to high flow oxygen.  Pulmonology consulted.  Chest xray verified pulmonary congestion with positive response to diuresis noted.  BNP elevated. Pt noted to renal failure with Creatinine of 4 and Nephrology consulted.  Baseline creatinine 2.5-3 noted.  Echo results showed EF 45% with diastolic dysfunction, PAP 69, +WMA, Pulm HTN, and mild to moderate MR.  LHC planned for tomorrow. H/H 8.2/25.3 with 1 unit of PRBCs transfused.  Procalcitonin of 0.66.  Blood culture results pending.  Pt denies acute distress at this time. As of 11/30 R/LHC scheduled today showed Lad prox 90% mid 50% lcx prox 50% rca diffusely diseased 70% normal filling pressures moderate pulmonary htn. Cardiology recommending CABG vs LAD arthrectomy stent. CVT consulted, not amenable to CABG due to severe calcification and high risk of morbidity and mortality. 12/3/18- Awaiting LHC today with intervention of LAD. Denies any chest pain or SOB. 12/4/18- S/p PCI to LAD.  Tolerated procedure well. Renal function stable this morning. Plans inplace for OP Nephrology follow up and HD access implant. Patient to follow-up with PCP in 3 days for hospital follow-up of NSTEMI. Patient also to follow-up with cardiology, nephrology, and vascular surgery outpatient. Patient seen and examined on the date of discharge and found suitable for discharge.           Updated/ annual due 6/19:  HM: 11/17 fluvax, 1/16 [id number], 5/14 [id number], 6/12 TDaP, 9/16 mmg, Cscope about 5y ago outside, 2/18 Eye Dr. Coello.     Review of Systems      Objective:   There were no vitals taken for this visit.    Physical Exam      Assessment:       No diagnosis found.      Plan:     There are no diagnoses linked to this encounter.

## 2018-12-12 NOTE — PROGRESS NOTES
Transitional Care Note  Subjective:      Patient ID: Avril Monzon is a 73 y.o. female.    Chief Complaint: Transitional Care    Family and/or Caretaker present at visit?  No.  Diagnostic tests reviewed/disposition: No diagnosic tests pending after this hospitalization.  Disease/illness education:   Home health/community services discussion/referrals: Patient has home health established at [facility]..   Establishment or re-establishment of referral orders for community resources: No other necessary community resources.   Discussion with other health care providers: No discussion with other health care providers necessary.     Here for follow up of medical problems and hospital follow up for NSTEMI with stent placement.  Only symptom was coughing and hypoxia.  Given 2u PRBC.  Saw Cards 2 days ago, and has f/u in 2 weeks.  On ASA and plavix.  Cards is monitoring BP through  nurse.  AC breakfast sugars 120-134.  No low sugars.  Weighing self every morning.  Little residual cough.  No f/c/sw.  No cp/sob/palp.  BMs slow, takes dulcolax prn.  Urine normal.      Hospital Course:   Pt admitted to Inpatient status for NSTEMI with Troponin significantly elevated.  Cardiology consulted with Heparin infusion initiated.  EKG positive for ST depression.  Pt placed on BIPAP due to respiratory distress and later weaned to high flow oxygen.  Pulmonology consulted.  Chest xray verified pulmonary congestion with positive response to diuresis noted.  BNP elevated. Pt noted to renal failure with Creatinine of 4 and Nephrology consulted.  Baseline creatinine 2.5-3 noted.  Echo results showed EF 45% with diastolic dysfunction, PAP 69, +WMA, Pulm HTN, and mild to moderate MR.  C planned for tomorrow. H/H 8.2/25.3 with 1 unit of PRBCs transfused.  Procalcitonin of 0.66.  Blood culture results pending.  Pt denies acute distress at this time. As of 11/30 R/LHC scheduled today showed Lad prox 90% mid 50% lcx prox 50% rca diffusely  diseased 70% normal filling pressures moderate pulmonary htn. Cardiology recommending CABG vs LAD arthrectomy stent. CVT consulted, not amenable to CABG due to severe calcification and high risk of morbidity and mortality. 12/3/18- Awaiting [facility] today with intervention of LAD. Denies any chest pain or SOB. 12/4/18- S/p PCI to LAD. Tolerated procedure well. Renal function stable this morning. Plans inplace for OP Nephrology follow up and HD access implant. Patient to follow-up with PCP in 3 days for hospital follow-up of NSTEMI. Patient also to follow-up with cardiology, nephrology, and vascular surgery outpatient. Patient seen and examined on the date of discharge and found suitable for discharge.          ECHO:  CONCLUSIONS     1 - Wall motion abnormalities.     2 - Mildly depressed left ventricular systolic function (EF 45-50%).     3 - Impaired LV relaxation, normal LAP (grade 1 diastolic dysfunction).     4 - Normal right ventricular systolic function .     5 - Pulmonary hypertension. The estimated PA systolic pressure is 69 mmHg.     6 - Mild to moderate mitral regurgitation.     7 - Mild tricuspid regurgitation.     8 - Intermediate central venous pressure.     9 - Right pleural effusion.       Updated/annual due 6/19:  HM: 11/17 fluvax, 1/16 [id number], 5/14 [id number], 6/12 TDaP, 9/16 mmg, Cscope about 5y ago outside, 2/18 Eye Dr. Coello.      HPI    Review of Systems   Constitutional: Negative for chills, diaphoresis and fever.   Respiratory: Negative for cough and shortness of breath.    Cardiovascular: Negative for chest pain, palpitations and leg swelling.   Gastrointestinal: Negative for blood in stool, constipation, diarrhea, nausea and vomiting.   Genitourinary: Negative for dysuria, frequency and hematuria.   Psychiatric/Behavioral: The patient is not nervous/anxious.          Objective:   BP (!) 158/72 (BP Location: Right arm, Patient Position: Sitting, BP Method: Medium (Manual))   Pulse 70   Temp 97.3  "°F (36.3 °C) (Tympanic)   Ht 5' 5" (1.651 m)   Wt 68.6 kg (151 lb 3.8 oz)   SpO2 96%   BMI 25.17 kg/m²     Physical Exam   Constitutional: She is oriented to person, place, and time. She appears well-developed.   HENT:   Mouth/Throat: Oropharynx is clear and moist.   Neck: Neck supple. Carotid bruit is not present. No thyroid mass present.   Cardiovascular: Normal rate, regular rhythm and intact distal pulses. Exam reveals no gallop and no friction rub.   No murmur heard.  Pulmonary/Chest: Effort normal and breath sounds normal. She has no wheezes. She has no rales.   Abdominal: Soft. Bowel sounds are normal. She exhibits no mass. There is no hepatosplenomegaly. There is no tenderness.   Musculoskeletal: She exhibits no edema.   Lymphadenopathy:     She has no cervical adenopathy.   Neurological: She is alert and oriented to person, place, and time.   Psychiatric: She has a normal mood and affect.     Results for orders placed or performed in visit on 12/10/18   Basic metabolic panel   Result Value Ref Range    Sodium 141 136 - 145 mmol/L    Potassium 4.7 3.5 - 5.1 mmol/L    Chloride 106 95 - 110 mmol/L    CO2 24 23 - 29 mmol/L    Glucose 221 (H) 70 - 110 mg/dL    BUN, Bld 46 (H) 8 - 23 mg/dL    Creatinine 3.8 (H) 0.5 - 1.4 mg/dL    Calcium 9.3 8.7 - 10.5 mg/dL    Anion Gap 11 8 - 16 mmol/L    eGFR if African American 13 (A) >60 mL/min/1.73 m^2    eGFR if non African American 11 (A) >60 mL/min/1.73 m^2   Brain natriuretic peptide   Result Value Ref Range    BNP 1,075 (H) 0 - 99 pg/mL         Assessment:       1. Coronary artery disease involving native coronary artery of native heart with angina pectoris with documented spasm    2. Non-ST elevation MI (NSTEMI)    3. Acute on chronic systolic congestive heart failure    4. Controlled type 2 diabetes mellitus with diabetic polyneuropathy, with long-term current use of insulin    5. Diabetes mellitus with stage 4 chronic kidney disease GFR 15-29    6. " Hyperlipidemia associated with type 2 diabetes mellitus    7. Hypertension associated with diabetes          Plan:     Coronary artery disease/Non-ST elevation MI (NSTEMI)/ Acute on chronic systolic congestive heart failure- Cards monitoring.    Controlled type 2 diabetes mellitus with diabetic polyneuropathy, with long-term current use of insulin- stable, cont rx.    Diabetes mellitus with stage 4 chronic kidney disease GFR 15-29    Hyperlipidemia associated with type 2 diabetes mellitus- on high dose statin.    Hypertension associated with diabetes- monitor with HH.    RTC 3mo.

## 2018-12-19 ENCOUNTER — PATIENT OUTREACH (OUTPATIENT)
Dept: ADMINISTRATIVE | Facility: HOSPITAL | Age: 73
End: 2018-12-19

## 2018-12-20 ENCOUNTER — TELEPHONE (OUTPATIENT)
Dept: INTERNAL MEDICINE | Facility: CLINIC | Age: 73
End: 2018-12-20

## 2018-12-20 NOTE — TELEPHONE ENCOUNTER
----- Message from Ana Norris sent at 12/20/2018 12:43 PM CST -----  Contact: Home Health Nurse - Ms Arian  Stated the pt weight is at 141 is was set 145-165, she can be reached at [phone] Thanks

## 2018-12-26 ENCOUNTER — TELEPHONE (OUTPATIENT)
Dept: INTERNAL MEDICINE | Facility: CLINIC | Age: 73
End: 2018-12-26

## 2018-12-26 NOTE — TELEPHONE ENCOUNTER
Home health nurse called to report pt's weight.  Today pt weighed 142lb.  Last visit, 141lb.  Parameters are 145-160.    Nurse would like to be advised of any new orders.  Informed nurse that Dr. Germain is not in the office and that I would forward info to pt's cardiologist.  Pt was referred to HH after NSTEMI by hospital physician./rpr

## 2018-12-26 NOTE — TELEPHONE ENCOUNTER
----- Message from Carolyne Carbone sent at 12/26/2018 12:26 PM CST -----  Contact: Rikita, [facility]  Ms Arian state that patient weight is 142 and perimeters are 145-160, and las visit was 141. Please call her back if needed at [phone]. Thank you

## 2019-01-02 ENCOUNTER — TELEPHONE (OUTPATIENT)
Dept: CARDIOLOGY | Facility: CLINIC | Age: 74
End: 2019-01-02

## 2019-01-02 NOTE — TELEPHONE ENCOUNTER
----- Message from Michelle Holman MD sent at 1/2/2019  1:27 PM CST -----  Contact: [facility]  SEE IF SHE CAN GET A MID LEVEL APPOINTMENT PLEASE.  ----- Message -----  From: Terri Pinedo LPN  Sent: 1/2/2019  11:11 AM  To: Michelle Holman MD    Tried calling arian back, no answer, but wanted to forward message to you.  ----- Message -----  From: Mackenzie Montaño  Sent: 1/2/2019  10:39 AM  To: [facility]    Arian called to speak with the nurse; the patient's BP is elevated 170/80 and her weight is out of the perimeters that the doctor wanted. Today it was 143 lb (last week it was 142). She can be contacted at [phone].    Thanks,  Mackenzie

## 2019-01-04 ENCOUNTER — CLINICAL SUPPORT (OUTPATIENT)
Dept: CARDIOLOGY | Facility: CLINIC | Age: 74
End: 2019-01-04
Attending: INTERNAL MEDICINE
Payer: MEDICARE

## 2019-01-04 ENCOUNTER — OFFICE VISIT (OUTPATIENT)
Dept: CARDIOLOGY | Facility: CLINIC | Age: 74
End: 2019-01-04
Payer: MEDICARE

## 2019-01-04 VITALS
BODY MASS INDEX: 24.32 KG/M2 | SYSTOLIC BLOOD PRESSURE: 164 MMHG | HEART RATE: 72 BPM | HEIGHT: 65 IN | DIASTOLIC BLOOD PRESSURE: 74 MMHG | WEIGHT: 146 LBS

## 2019-01-04 DIAGNOSIS — E11.51 TYPE 2 DIABETES MELLITUS WITH DIABETIC PERIPHERAL ANGIOPATHY WITHOUT GANGRENE, WITH LONG-TERM CURRENT USE OF INSULIN: ICD-10-CM

## 2019-01-04 DIAGNOSIS — I21.4 NON-ST ELEVATION MI (NSTEMI): ICD-10-CM

## 2019-01-04 DIAGNOSIS — N18.5 STAGE 5 CHRONIC KIDNEY DISEASE: ICD-10-CM

## 2019-01-04 DIAGNOSIS — I27.20 PULMONARY HYPERTENSION: ICD-10-CM

## 2019-01-04 DIAGNOSIS — J96.01 ACUTE HYPOXEMIC RESPIRATORY FAILURE: ICD-10-CM

## 2019-01-04 DIAGNOSIS — E11.69 HYPERLIPIDEMIA ASSOCIATED WITH TYPE 2 DIABETES MELLITUS: ICD-10-CM

## 2019-01-04 DIAGNOSIS — D50.0 IRON DEFICIENCY ANEMIA DUE TO CHRONIC BLOOD LOSS: ICD-10-CM

## 2019-01-04 DIAGNOSIS — E11.21 DIABETIC NEPHROPATHY ASSOCIATED WITH TYPE 2 DIABETES MELLITUS: ICD-10-CM

## 2019-01-04 DIAGNOSIS — Z86.73 HISTORY OF CVA (CEREBROVASCULAR ACCIDENT): ICD-10-CM

## 2019-01-04 DIAGNOSIS — E11.3553 TYPE 2 DIABETES MELLITUS WITH STABLE PROLIFERATIVE RETINOPATHY OF BOTH EYES, WITH LONG-TERM CURRENT USE OF INSULIN: ICD-10-CM

## 2019-01-04 DIAGNOSIS — I50.23 ACUTE ON CHRONIC SYSTOLIC CONGESTIVE HEART FAILURE: ICD-10-CM

## 2019-01-04 DIAGNOSIS — I70.0 AORTIC ATHEROSCLEROSIS: ICD-10-CM

## 2019-01-04 DIAGNOSIS — I25.111 CORONARY ARTERY DISEASE INVOLVING NATIVE CORONARY ARTERY OF NATIVE HEART WITH ANGINA PECTORIS WITH DOCUMENTED SPASM: Primary | ICD-10-CM

## 2019-01-04 DIAGNOSIS — E78.00 PURE HYPERCHOLESTEROLEMIA: Chronic | ICD-10-CM

## 2019-01-04 DIAGNOSIS — I77.9 BILATERAL CAROTID ARTERY DISEASE, UNSPECIFIED TYPE: ICD-10-CM

## 2019-01-04 DIAGNOSIS — Z79.4 TYPE 2 DIABETES MELLITUS WITH STABLE PROLIFERATIVE RETINOPATHY OF BOTH EYES, WITH LONG-TERM CURRENT USE OF INSULIN: ICD-10-CM

## 2019-01-04 DIAGNOSIS — E11.59 HYPERTENSION ASSOCIATED WITH DIABETES: Chronic | ICD-10-CM

## 2019-01-04 DIAGNOSIS — N18.4 ANEMIA IN STAGE 4 CHRONIC KIDNEY DISEASE: Chronic | ICD-10-CM

## 2019-01-04 DIAGNOSIS — I67.2 CEREBRAL ATHEROSCLEROSIS: ICD-10-CM

## 2019-01-04 DIAGNOSIS — R80.9 PROTEINURIA, UNSPECIFIED TYPE: ICD-10-CM

## 2019-01-04 DIAGNOSIS — I15.2 HYPERTENSION ASSOCIATED WITH DIABETES: Chronic | ICD-10-CM

## 2019-01-04 DIAGNOSIS — N18.4 CHRONIC KIDNEY DISEASE (CKD) STAGE G4/A3, SEVERELY DECREASED GLOMERULAR FILTRATION RATE (GFR) BETWEEN 15-29 ML/MIN/1.73 SQUARE METER AND ALBUMINURIA CREATININE RATIO GREATER THAN 300 MG/G: ICD-10-CM

## 2019-01-04 DIAGNOSIS — D63.1 ANEMIA IN STAGE 4 CHRONIC KIDNEY DISEASE: Chronic | ICD-10-CM

## 2019-01-04 DIAGNOSIS — Z79.4 TYPE 2 DIABETES MELLITUS WITH DIABETIC PERIPHERAL ANGIOPATHY WITHOUT GANGRENE, WITH LONG-TERM CURRENT USE OF INSULIN: ICD-10-CM

## 2019-01-04 DIAGNOSIS — E78.5 HYPERLIPIDEMIA ASSOCIATED WITH TYPE 2 DIABETES MELLITUS: ICD-10-CM

## 2019-01-04 LAB — INTERNAL CAROTID STENOSIS: ABNORMAL

## 2019-01-04 PROCEDURE — 99999 PR PBB SHADOW E&M-EST. PATIENT-LVL III: CPT | Mod: PBBFAC,,, | Performed by: INTERNAL MEDICINE

## 2019-01-04 PROCEDURE — 3044F HG A1C LEVEL LT 7.0%: CPT | Mod: CPTII,S$GLB,, | Performed by: INTERNAL MEDICINE

## 2019-01-04 PROCEDURE — 3078F PR MOST RECENT DIASTOLIC BLOOD PRESSURE < 80 MM HG: ICD-10-PCS | Mod: CPTII,S$GLB,, | Performed by: INTERNAL MEDICINE

## 2019-01-04 PROCEDURE — 3078F DIAST BP <80 MM HG: CPT | Mod: CPTII,S$GLB,, | Performed by: INTERNAL MEDICINE

## 2019-01-04 PROCEDURE — 99499 UNLISTED E&M SERVICE: CPT | Mod: S$GLB,,, | Performed by: INTERNAL MEDICINE

## 2019-01-04 PROCEDURE — 99214 PR OFFICE/OUTPT VISIT, EST, LEVL IV, 30-39 MIN: ICD-10-PCS | Mod: S$GLB,,, | Performed by: INTERNAL MEDICINE

## 2019-01-04 PROCEDURE — 1101F PT FALLS ASSESS-DOCD LE1/YR: CPT | Mod: CPTII,S$GLB,, | Performed by: INTERNAL MEDICINE

## 2019-01-04 PROCEDURE — 99214 OFFICE O/P EST MOD 30 MIN: CPT | Mod: S$GLB,,, | Performed by: INTERNAL MEDICINE

## 2019-01-04 PROCEDURE — 99499 RISK ADDL DX/OHS AUDIT: ICD-10-PCS | Mod: S$GLB,,, | Performed by: INTERNAL MEDICINE

## 2019-01-04 PROCEDURE — 99999 PR PBB SHADOW E&M-EST. PATIENT-LVL III: ICD-10-PCS | Mod: PBBFAC,,, | Performed by: INTERNAL MEDICINE

## 2019-01-04 PROCEDURE — 3077F PR MOST RECENT SYSTOLIC BLOOD PRESSURE >= 140 MM HG: ICD-10-PCS | Mod: CPTII,S$GLB,, | Performed by: INTERNAL MEDICINE

## 2019-01-04 PROCEDURE — 1101F PR PT FALLS ASSESS DOC 0-1 FALLS W/OUT INJ PAST YR: ICD-10-PCS | Mod: CPTII,S$GLB,, | Performed by: INTERNAL MEDICINE

## 2019-01-04 PROCEDURE — 3044F PR MOST RECENT HEMOGLOBIN A1C LEVEL <7.0%: ICD-10-PCS | Mod: CPTII,S$GLB,, | Performed by: INTERNAL MEDICINE

## 2019-01-04 PROCEDURE — 93880 EXTRACRANIAL BILAT STUDY: CPT | Mod: S$GLB,,, | Performed by: INTERNAL MEDICINE

## 2019-01-04 PROCEDURE — 3077F SYST BP >= 140 MM HG: CPT | Mod: CPTII,S$GLB,, | Performed by: INTERNAL MEDICINE

## 2019-01-04 PROCEDURE — 93880 CAR US DOPPLER CAROTID BILATERAL: ICD-10-PCS | Mod: S$GLB,,, | Performed by: INTERNAL MEDICINE

## 2019-01-04 RX ORDER — ISOSORBIDE DINITRATE AND HYDRALAZINE HYDROCHLORIDE 37.5; 2 MG/1; MG/1
1 TABLET ORAL 3 TIMES DAILY
Qty: 90 TABLET | Refills: 11 | Status: SHIPPED | OUTPATIENT
Start: 2019-01-04 | End: 2019-03-29 | Stop reason: SDUPTHER

## 2019-01-04 RX ORDER — METOPROLOL TARTRATE 25 MG/1
25 TABLET, FILM COATED ORAL 2 TIMES DAILY
Qty: 180 TABLET | Refills: 3 | Status: SHIPPED | OUTPATIENT
Start: 2019-01-04 | End: 2020-01-12

## 2019-01-04 RX ORDER — TORSEMIDE 20 MG/1
20 TABLET ORAL DAILY
Qty: 90 TABLET | Refills: 3 | Status: SHIPPED | OUTPATIENT
Start: 2019-01-04 | End: 2019-03-29

## 2019-01-04 RX ORDER — CLOPIDOGREL BISULFATE 75 MG/1
75 TABLET ORAL DAILY
Qty: 90 TABLET | Refills: 3 | Status: SHIPPED | OUTPATIENT
Start: 2019-01-04 | End: 2020-01-03

## 2019-01-04 NOTE — PROGRESS NOTES
Subjective:   Patient ID:  Avril Monzon is a 73 y.o. female who presents for follow up of Coronary Artery Disease      HPI  A 74 yo female with diabetes with renal and ophtalmic complications ckd stage 5 htn hlp is here for f/u after an admit to the hospital for nstemi acute pulmonary edema respiratory failure chf she had aheart cath showing significant cad she was turned down by cardiac surgery had atherectomy stenting of the lad prox. Ha d spasm superimposed on ostial lcx disease that resolved. She ahs no exertional chest pain or shortness of breath has fatigue when she walks.has been trying to be compliant with diet. Her htn is not well controlled.   Past Medical History:   Diagnosis Date    Acute hypoxemic respiratory failure 11/26/2018    Anemia     Arthritis     back, hand, knees    Back pain     Cataract     CKD stage G4/A3, GFR 15 - 29 and albumin creatinine ratio >300 mg/g     GFR 40% Jan 2014 and 33% ub 3/2014 ([facility])    Coronary artery disease involving native coronary artery of native heart 11/30/2018    Diabetic retinopathy     DM (diabetes mellitus) type II controlled, neurological manifestation     GERD (gastroesophageal reflux disease)     Glaucoma     Heart failure     Hyperlipidemia     Hypertension     NSTEMI (non-ST elevated myocardial infarction) 11/27/2018    Peripheral neuropathy     Polyneuropathy     Proteinuria     >6 mo     Seizures     [facility] 1/2014 -dick CT NRI showed small vessel    Stroke 2012,2014    Tobacco dependence     Type 2 diabetes with peripheral circulatory disorder, controlled        Past Surgical History:   Procedure Laterality Date    BREAST LUMPECTOMY Left     benign, when patient was 15 years old    BREAST MASS EXCISION      ,benign, age 15.    CATHETERIZATION, HEART, BOTH LEFT AND RIGHT N/A 11/30/2018    Performed by [facility]    CATHETERIZATION, HEART, BOTH LEFT AND RIGHT N/A 11/28/2018    Performed by [facility]man MD  at [facility] CATH LAB    HYSTERECTOMY  1982    OOPHORECTOMY      wrist cyst Right 1980s    dorsal wrist cyst       Social History     Tobacco Use    Smoking status: Former Smoker     Packs/day: 1.50     Years: 30.00     Pack years: 45.00     Types: Cigarettes     Last attempt to quit: 1990     Years since quittin.0    Smokeless tobacco: Former User   Substance Use Topics    Alcohol use: No     Alcohol/week: 0.0 oz    Drug use: No       Family History   Problem Relation Age of Onset    Diabetes Mother     Hyperlipidemia Mother     Hypertension Mother     Heart disease Mother         MI    Muscular dystrophy Son     Cancer Maternal Grandmother         colon       Current Outpatient Medications   Medication Sig    aspirin (ECOTRIN) 81 MG EC tablet Take 1 tablet (81 mg total) by mouth once daily.    atorvastatin (LIPITOR) 80 MG tablet Take 1 tablet (80 mg total) by mouth once daily.    blood sugar diagnostic Strp 1 strip by Misc.(Non-Drug; Combo Route) route 3 (three) times daily. relion ultima    calcifediol (RAYALDEE) 30 mcg Cs24 Take 30 mcg by mouth once daily.    clopidogrel (PLAVIX) 75 mg tablet Take 1 tablet (75 mg total) by mouth once daily.    hydrALAZINE (APRESOLINE) 25 MG tablet Take 1 tablet (25 mg total) by mouth every 8 (eight) hours.    insulin NPH-insulin regular, 70/30, (NOVOLIN 70/30) 100 unit/mL (70-30) injection INJECT SUBCUTANEOUSLY 30 UNITS IN THE MORNING AND INJECT 25 UNITS IN THE EVENING (Patient taking differently: INJECT SUBCUTANEOUSLY 30 UNITS IN THE MORNING AND INJECT 25 UNITS IN THE EVENING)    isosorbide mononitrate (IMDUR) 60 MG 24 hr tablet Take 1 tablet (60 mg total) by mouth once daily.    lancets (ACCU-CHEK SOFTCLIX LANCETS) Misc 1 lancet by Misc.(Non-Drug; Combo Route) route 3 (three) times daily.    metoprolol tartrate (LOPRESSOR) 25 MG tablet Take 1 tablet (25 mg total) by mouth 2 (two) times daily.    nitroGLYCERIN (NITROSTAT) 0.4 MG SL tablet Place 1  tablet (0.4 mg total) under the tongue every 5 (five) minutes as needed for Chest pain.    sodium bicarbonate 325 MG tablet TAKE ONE TABLET BY MOUTH THREE TIMES DAILY    torsemide (DEMADEX) 20 MG Tab Take 1 tablet (20 mg total) by mouth once daily.     No current facility-administered medications for this visit.      Current Outpatient Medications on File Prior to Visit   Medication Sig    aspirin (ECOTRIN) 81 MG EC tablet Take 1 tablet (81 mg total) by mouth once daily.    atorvastatin (LIPITOR) 80 MG tablet Take 1 tablet (80 mg total) by mouth once daily.    blood sugar diagnostic Strp 1 strip by Misc.(Non-Drug; Combo Route) route 3 (three) times daily. relion ultima    calcifediol (RAYALDEE) 30 mcg Cs24 Take 30 mcg by mouth once daily.    hydrALAZINE (APRESOLINE) 25 MG tablet Take 1 tablet (25 mg total) by mouth every 8 (eight) hours.    insulin NPH-insulin regular, 70/30, (NOVOLIN 70/30) 100 unit/mL (70-30) injection INJECT SUBCUTANEOUSLY 30 UNITS IN THE MORNING AND INJECT 25 UNITS IN THE EVENING (Patient taking differently: INJECT SUBCUTANEOUSLY 30 UNITS IN THE MORNING AND INJECT 25 UNITS IN THE EVENING)    isosorbide mononitrate (IMDUR) 60 MG 24 hr tablet Take 1 tablet (60 mg total) by mouth once daily.    lancets (ACCU-CHEK SOFTCLIX LANCETS) Misc 1 lancet by Misc.(Non-Drug; Combo Route) route 3 (three) times daily.    nitroGLYCERIN (NITROSTAT) 0.4 MG SL tablet Place 1 tablet (0.4 mg total) under the tongue every 5 (five) minutes as needed for Chest pain.    sodium bicarbonate 325 MG tablet TAKE ONE TABLET BY MOUTH THREE TIMES DAILY    [DISCONTINUED] clopidogrel (PLAVIX) 75 mg tablet Take 1 tablet (75 mg total) by mouth once daily.    [DISCONTINUED] metoprolol tartrate (LOPRESSOR) 25 MG tablet Take 1 tablet (25 mg total) by mouth 2 (two) times daily.    [DISCONTINUED] torsemide (DEMADEX) 20 MG Tab Take 1 tablet (20 mg total) by mouth once daily.     No current facility-administered medications  on file prior to visit.      Review of patient's allergies indicates:   Allergen Reactions    Codeine Swelling    Darvon [propoxyphene] Swelling       Review of Systems   Constitution: Positive for weakness and malaise/fatigue. Negative for diaphoresis and weight gain.   HENT: Negative for hoarse voice.    Eyes: Negative for double vision and visual disturbance.   Cardiovascular: Negative for chest pain, claudication, cyanosis, dyspnea on exertion, irregular heartbeat, leg swelling, near-syncope, orthopnea, palpitations, paroxysmal nocturnal dyspnea and syncope.   Respiratory: Negative for cough, hemoptysis, shortness of breath and snoring.    Hematologic/Lymphatic: Negative for bleeding problem. Does not bruise/bleed easily.   Skin: Negative for color change and poor wound healing.   Musculoskeletal: Negative for muscle cramps, muscle weakness and myalgias.   Gastrointestinal: Negative for bloating, abdominal pain, change in bowel habit, diarrhea, heartburn, hematemesis, hematochezia, melena and nausea.   Neurological: Negative for excessive daytime sleepiness, dizziness, headaches, light-headedness, loss of balance and numbness.   Psychiatric/Behavioral: Negative for memory loss. The patient does not have insomnia.    Allergic/Immunologic: Negative for hives.       Objective:   Physical Exam   Constitutional: She is oriented to person, place, and time. She appears well-developed and well-nourished. She does not appear ill. No distress.   HENT:   Head: Normocephalic and atraumatic.   Eyes: EOM are normal. Pupils are equal, round, and reactive to light. No scleral icterus.   Neck: Normal range of motion. Neck supple. Normal carotid pulses, no hepatojugular reflux and no JVD present. Carotid bruit is not present. No tracheal deviation present. No thyromegaly present.   Cardiovascular: Normal rate, regular rhythm and normal pulses. Exam reveals no gallop and no friction rub.   Murmur heard.  High-pitched blowing  "holosystolic murmur is present with a grade of 1/6 at the apex.   Harsh midsystolic murmur of grade 2/6 is also present at the upper right sternal border radiating to the neck.  Pulses:       Carotid pulses are 2+ on the right side with bruit, and 2+ on the left side with bruit.       Radial pulses are 2+ on the right side, and 2+ on the left side.        Femoral pulses are 2+ on the right side, and 2+ on the left side.       Popliteal pulses are 2+ on the right side, and 2+ on the left side.        Dorsalis pedis pulses are 2+ on the right side, and 2+ on the left side.        Posterior tibial pulses are 2+ on the right side, and 2+ on the left side.   Pulmonary/Chest: Effort normal and breath sounds normal. No respiratory distress. She has no wheezes. She has no rhonchi. She has no rales. She exhibits no tenderness.   Abdominal: Soft. Normal appearance, normal aorta and bowel sounds are normal. She exhibits no abdominal bruit, no ascites and no pulsatile midline mass. There is no hepatomegaly. There is no tenderness.   Musculoskeletal: She exhibits no edema.        Right shoulder: She exhibits no deformity.   Neurological: She is alert and oriented to person, place, and time. She has normal strength. No cranial nerve deficit. Coordination normal.   Skin: Skin is warm and dry. No rash noted. She is not diaphoretic. No cyanosis or erythema. Nails show no clubbing.   Psychiatric: She has a normal mood and affect. Her speech is normal and behavior is normal.   Nursing note and vitals reviewed.    Vitals:    01/04/19 0827 01/04/19 0831   BP: (!) 164/74 (!) 164/74   BP Location: Left arm Right arm   Pulse: 72    Weight: 66.2 kg (146 lb)    Height: 5' 5" (1.651 m)      Lab Results   Component Value Date    CHOL 173 11/27/2018    CHOL 190 06/12/2018    CHOL 176 06/23/2017     Lab Results   Component Value Date    HDL 58 11/27/2018    HDL 56 06/12/2018    HDL 40 06/23/2017     Lab Results   Component Value Date    LDLCALC " 97.6 11/27/2018    LDLCALC 100.4 06/12/2018    LDLCALC 110.6 06/23/2017     Lab Results   Component Value Date    TRIG 87 11/27/2018    TRIG 168 (H) 06/12/2018    TRIG 127 06/23/2017     Lab Results   Component Value Date    CHOLHDL 33.5 11/27/2018    CHOLHDL 29.5 06/12/2018    CHOLHDL 22.7 06/23/2017       Chemistry        Component Value Date/Time     12/10/2018 1210    K 4.7 12/10/2018 1210     12/10/2018 1210    CO2 24 12/10/2018 1210    BUN 46 (H) 12/10/2018 1210    CREATININE 3.8 (H) 12/10/2018 1210     (H) 12/10/2018 1210        Component Value Date/Time    CALCIUM 9.3 12/10/2018 1210    ALKPHOS 84 11/26/2018 1807    AST 25 11/26/2018 1807    ALT 13 11/26/2018 1807    BILITOT 0.8 11/26/2018 1807    ESTGFRAFRICA 13 (A) 12/10/2018 1210    EGFRNONAA 11 (A) 12/10/2018 1210          Lab Results   Component Value Date    TSH 2.438 06/12/2018     Lab Results   Component Value Date    INR 0.9 11/26/2018     Lab Results   Component Value Date    WBC 6.52 12/04/2018    HGB 9.4 (L) 12/04/2018    HCT 28.1 (L) 12/04/2018    MCV 90 12/04/2018     12/04/2018     BMP  Sodium   Date Value Ref Range Status   12/10/2018 141 136 - 145 mmol/L Final     Potassium   Date Value Ref Range Status   12/10/2018 4.7 3.5 - 5.1 mmol/L Final     Chloride   Date Value Ref Range Status   12/10/2018 106 95 - 110 mmol/L Final     CO2   Date Value Ref Range Status   12/10/2018 24 23 - 29 mmol/L Final     BUN, Bld   Date Value Ref Range Status   12/10/2018 46 (H) 8 - 23 mg/dL Final     Creatinine   Date Value Ref Range Status   12/10/2018 3.8 (H) 0.5 - 1.4 mg/dL Final     Calcium   Date Value Ref Range Status   12/10/2018 9.3 8.7 - 10.5 mg/dL Final     Anion Gap   Date Value Ref Range Status   12/10/2018 11 8 - 16 mmol/L Final     eGFR if    Date Value Ref Range Status   12/10/2018 13 (A) >60 mL/min/1.73 m^2 Final     eGFR if non    Date Value Ref Range Status   12/10/2018 11 (A) >60  mL/min/1.73 m^2 Final     Comment:     Calculation used to obtain the estimated glomerular filtration  rate (eGFR) is the CKD-EPI equation.        CrCl cannot be calculated (Patient's most recent lab result is older than the maximum 7 days allowed.).    Assessment:     1. Coronary artery disease involving native coronary artery of native heart with angina pectoris with documented spasm    2. History of CVA (cerebrovascular accident)    3. 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, with long-term current use of insulin    4. Pulmonary hypertension    5. Acute hypoxemic respiratory failure secondary to acute decompensated HF    6. Acute on chronic systolic congestive heart failure    7. Aortic atherosclerosis    8. Bilateral carotid artery disease, unspecified type    9. Pure hypercholesterolemia    10. Hyperlipidemia associated with type 2 diabetes mellitus    11. Hypertension associated with diabetes    12. Non-ST elevation MI (NSTEMI)    13. Chronic kidney disease (CKD) stage G4/A3, severely decreased glomerular filtration rate (GFR) between 15-29 mL/min/1.73 square meter and albuminuria creatinine ratio greater than 300 mg/g    14. Diabetic nephropathy associated with type 2 diabetes mellitus    15. Proteinuria, unspecified type    16. Stage 5 chronic kidney disease    17. Anemia in stage 4 chronic kidney disease    18. Iron deficiency anemia due to chronic blood loss    19. Type 2 diabetes mellitus with diabetic peripheral angiopathy without gangrene, with long-term current use of insulin      Stable cardiac wise no angina clinically her blood pressure is still an issue. Will switch the hydralazine and imdur to  bidil and assess response if not well controlled will add amlodipine.  Needs f/u with nephrology and needs carotid u/s to check on  Disease progression.   Plan:     Carotid u/s  Needs f/u with nephrology   bidil 20/37.5 tid   Stop imdur and hydralazine   F/u in 2 -3 weeks  to assess bp   Low salt diet.

## 2019-01-08 ENCOUNTER — TELEPHONE (OUTPATIENT)
Dept: CARDIOLOGY | Facility: CLINIC | Age: 74
End: 2019-01-08

## 2019-01-10 ENCOUNTER — TELEPHONE (OUTPATIENT)
Dept: CARDIOLOGY | Facility: CLINIC | Age: 74
End: 2019-01-10

## 2019-01-10 NOTE — TELEPHONE ENCOUNTER
----- Message from Jessie Tabor sent at 1/10/2019 10:39 AM CST -----  Contact: Arian/[facility]  Please call nurse @ [phone] regarding pt weight parameter, states pt wt is 140.6, need to clarify range for pt to stay in.

## 2019-01-10 NOTE — TELEPHONE ENCOUNTER
Per  weight of 140.6lb is acceptable for pt. No dizziness, lightheadedness, hypotension or other signs of dehydration per Arian (Home health nurse). Advised that nurse is to call clinic if weight increases 3lbs in 1 day or over 5lbs in 2 days. Arian verbalized understanding.

## 2019-01-18 ENCOUNTER — TELEPHONE (OUTPATIENT)
Dept: CARDIOLOGY | Facility: CLINIC | Age: 74
End: 2019-01-18

## 2019-01-24 ENCOUNTER — TELEPHONE (OUTPATIENT)
Dept: OPHTHALMOLOGY | Facility: CLINIC | Age: 74
End: 2019-01-24

## 2019-01-24 ENCOUNTER — OFFICE VISIT (OUTPATIENT)
Dept: CARDIOLOGY | Facility: CLINIC | Age: 74
End: 2019-01-24
Payer: MEDICARE

## 2019-01-24 VITALS
SYSTOLIC BLOOD PRESSURE: 146 MMHG | BODY MASS INDEX: 24.12 KG/M2 | WEIGHT: 144.75 LBS | DIASTOLIC BLOOD PRESSURE: 62 MMHG | HEIGHT: 65 IN | HEART RATE: 66 BPM

## 2019-01-24 DIAGNOSIS — I77.9 BILATERAL CAROTID ARTERY DISEASE, UNSPECIFIED TYPE: ICD-10-CM

## 2019-01-24 DIAGNOSIS — I50.23 ACUTE ON CHRONIC SYSTOLIC CONGESTIVE HEART FAILURE: ICD-10-CM

## 2019-01-24 DIAGNOSIS — I21.4 NSTEMI (NON-ST ELEVATED MYOCARDIAL INFARCTION): ICD-10-CM

## 2019-01-24 DIAGNOSIS — I15.2 HYPERTENSION ASSOCIATED WITH DIABETES: Primary | Chronic | ICD-10-CM

## 2019-01-24 DIAGNOSIS — Z79.4 CONTROLLED TYPE 2 DIABETES MELLITUS WITH DIABETIC POLYNEUROPATHY, WITH LONG-TERM CURRENT USE OF INSULIN: Chronic | ICD-10-CM

## 2019-01-24 DIAGNOSIS — Z86.73 HISTORY OF CVA (CEREBROVASCULAR ACCIDENT): ICD-10-CM

## 2019-01-24 DIAGNOSIS — I27.20 PULMONARY HYPERTENSION: ICD-10-CM

## 2019-01-24 DIAGNOSIS — E11.22 DIABETES MELLITUS WITH STAGE 4 CHRONIC KIDNEY DISEASE GFR 15-29: ICD-10-CM

## 2019-01-24 DIAGNOSIS — E78.00 PURE HYPERCHOLESTEROLEMIA: Chronic | ICD-10-CM

## 2019-01-24 DIAGNOSIS — N18.4 DIABETES MELLITUS WITH STAGE 4 CHRONIC KIDNEY DISEASE GFR 15-29: ICD-10-CM

## 2019-01-24 DIAGNOSIS — D63.1 ANEMIA IN STAGE 4 CHRONIC KIDNEY DISEASE: Chronic | ICD-10-CM

## 2019-01-24 DIAGNOSIS — R80.9 PROTEINURIA, UNSPECIFIED TYPE: ICD-10-CM

## 2019-01-24 DIAGNOSIS — E11.42 CONTROLLED TYPE 2 DIABETES MELLITUS WITH DIABETIC POLYNEUROPATHY, WITH LONG-TERM CURRENT USE OF INSULIN: Chronic | ICD-10-CM

## 2019-01-24 DIAGNOSIS — N18.4 ANEMIA IN STAGE 4 CHRONIC KIDNEY DISEASE: Chronic | ICD-10-CM

## 2019-01-24 DIAGNOSIS — Z79.4 TYPE 2 DIABETES MELLITUS WITH DIABETIC PERIPHERAL ANGIOPATHY WITHOUT GANGRENE, WITH LONG-TERM CURRENT USE OF INSULIN: ICD-10-CM

## 2019-01-24 DIAGNOSIS — E11.69 HYPERLIPIDEMIA ASSOCIATED WITH TYPE 2 DIABETES MELLITUS: ICD-10-CM

## 2019-01-24 DIAGNOSIS — E11.51 TYPE 2 DIABETES MELLITUS WITH DIABETIC PERIPHERAL ANGIOPATHY WITHOUT GANGRENE, WITH LONG-TERM CURRENT USE OF INSULIN: ICD-10-CM

## 2019-01-24 DIAGNOSIS — N18.4 CHRONIC KIDNEY DISEASE (CKD) STAGE G4/A3, SEVERELY DECREASED GLOMERULAR FILTRATION RATE (GFR) BETWEEN 15-29 ML/MIN/1.73 SQUARE METER AND ALBUMINURIA CREATININE RATIO GREATER THAN 300 MG/G: ICD-10-CM

## 2019-01-24 DIAGNOSIS — E11.59 HYPERTENSION ASSOCIATED WITH DIABETES: Primary | Chronic | ICD-10-CM

## 2019-01-24 DIAGNOSIS — I25.111 CORONARY ARTERY DISEASE INVOLVING NATIVE CORONARY ARTERY OF NATIVE HEART WITH ANGINA PECTORIS WITH DOCUMENTED SPASM: ICD-10-CM

## 2019-01-24 DIAGNOSIS — Z79.4 TYPE 2 DIABETES MELLITUS WITH STABLE PROLIFERATIVE RETINOPATHY OF BOTH EYES, WITH LONG-TERM CURRENT USE OF INSULIN: ICD-10-CM

## 2019-01-24 DIAGNOSIS — E11.3553 TYPE 2 DIABETES MELLITUS WITH STABLE PROLIFERATIVE RETINOPATHY OF BOTH EYES, WITH LONG-TERM CURRENT USE OF INSULIN: ICD-10-CM

## 2019-01-24 DIAGNOSIS — E78.5 HYPERLIPIDEMIA ASSOCIATED WITH TYPE 2 DIABETES MELLITUS: ICD-10-CM

## 2019-01-24 PROCEDURE — 3077F PR MOST RECENT SYSTOLIC BLOOD PRESSURE >= 140 MM HG: ICD-10-PCS | Mod: CPTII,S$GLB,, | Performed by: INTERNAL MEDICINE

## 2019-01-24 PROCEDURE — 99214 PR OFFICE/OUTPT VISIT, EST, LEVL IV, 30-39 MIN: ICD-10-PCS | Mod: S$GLB,,, | Performed by: INTERNAL MEDICINE

## 2019-01-24 PROCEDURE — 99499 UNLISTED E&M SERVICE: CPT | Mod: S$GLB,,, | Performed by: INTERNAL MEDICINE

## 2019-01-24 PROCEDURE — 1101F PR PT FALLS ASSESS DOC 0-1 FALLS W/OUT INJ PAST YR: ICD-10-PCS | Mod: CPTII,S$GLB,, | Performed by: INTERNAL MEDICINE

## 2019-01-24 PROCEDURE — 3078F PR MOST RECENT DIASTOLIC BLOOD PRESSURE < 80 MM HG: ICD-10-PCS | Mod: CPTII,S$GLB,, | Performed by: INTERNAL MEDICINE

## 2019-01-24 PROCEDURE — 99214 OFFICE O/P EST MOD 30 MIN: CPT | Mod: S$GLB,,, | Performed by: INTERNAL MEDICINE

## 2019-01-24 PROCEDURE — 3078F DIAST BP <80 MM HG: CPT | Mod: CPTII,S$GLB,, | Performed by: INTERNAL MEDICINE

## 2019-01-24 PROCEDURE — 99499 RISK ADDL DX/OHS AUDIT: ICD-10-PCS | Mod: S$GLB,,, | Performed by: INTERNAL MEDICINE

## 2019-01-24 PROCEDURE — 99999 PR PBB SHADOW E&M-EST. PATIENT-LVL III: CPT | Mod: PBBFAC,,, | Performed by: INTERNAL MEDICINE

## 2019-01-24 PROCEDURE — 3044F PR MOST RECENT HEMOGLOBIN A1C LEVEL <7.0%: ICD-10-PCS | Mod: CPTII,S$GLB,, | Performed by: INTERNAL MEDICINE

## 2019-01-24 PROCEDURE — 1101F PT FALLS ASSESS-DOCD LE1/YR: CPT | Mod: CPTII,S$GLB,, | Performed by: INTERNAL MEDICINE

## 2019-01-24 PROCEDURE — 99999 PR PBB SHADOW E&M-EST. PATIENT-LVL III: ICD-10-PCS | Mod: PBBFAC,,, | Performed by: INTERNAL MEDICINE

## 2019-01-24 PROCEDURE — 3077F SYST BP >= 140 MM HG: CPT | Mod: CPTII,S$GLB,, | Performed by: INTERNAL MEDICINE

## 2019-01-24 PROCEDURE — 3044F HG A1C LEVEL LT 7.0%: CPT | Mod: CPTII,S$GLB,, | Performed by: INTERNAL MEDICINE

## 2019-01-24 NOTE — TELEPHONE ENCOUNTER
Pt called Analy and asked for appt for not seeing well lately. Abiola  made her an appt for 2/5. Abiola  then asked me to  Review her chart to see if she needed to be seen sooner. I called the pt and she was not at home- I left message with Davi (At her home)  for her to call me back and we would try to get her in tomorrow with Dr Coello.

## 2019-01-24 NOTE — PROGRESS NOTES
Subjective:   Patient ID:  Avril Monzon is a 73 y.o. female who presents for follow up of Follow-up      HPI  A 72 yo female with ckd stage 4 diabetes htn hlp cad chf s/p nstemi and stenting lad is here for f/u htn she was seen last on 1/4 .I stopped her hydralazine and imdur and switch her to bidil tid. Her home health was getting reading in 120 and 140 systolic. SHE TOOK HER MEDS ON THE WAY HERE TODAY THAT IS WHY HER BP WAS ELEVATED. SHE AHS NO SIDE EFFECTS FROM THE MEDS DENIES ANY CHF ANGINA HEADACHES BLURRED VISION TIA. SHE HAS BEEN COMPLIANT WITH MEDS AND SALT INTAKE  NO LEG EDEMA.NO CHANGE IN REVIEW OF SYSTEMS. HOWEVER SHE GEST LIGHTHEADED IGF SHE BENDS HER HEAD.  Past Medical History:   Diagnosis Date    Acute hypoxemic respiratory failure 11/26/2018    Anemia     Arthritis     back, hand, knees    Back pain     Cataract     CKD stage G4/A3, GFR 15 - 29 and albumin creatinine ratio >300 mg/g     GFR 40% Jan 2014 and 33% ub 3/2014 ([facility])    Coronary artery disease involving native coronary artery of native heart 11/30/2018    Diabetic retinopathy     DM (diabetes mellitus) type II controlled, neurological manifestation     GERD (gastroesophageal reflux disease)     Glaucoma     Heart failure     Hyperlipidemia     Hypertension     NSTEMI (non-ST elevated myocardial infarction) 11/27/2018    Peripheral neuropathy     Polyneuropathy     Proteinuria     >6 mo     Seizures     [facility] 1/2014 -dick CT NRI showed small vessel    Stroke 2012,2014    Tobacco dependence     Type 2 diabetes with peripheral circulatory disorder, controlled        Past Surgical History:   Procedure Laterality Date    BREAST LUMPECTOMY Left     benign, when patient was 15 years old    BREAST MASS EXCISION      ,benign, age 15.    CATHETERIZATION, HEART, BOTH LEFT AND RIGHT N/A 11/30/2018    [facility]    CATHETERIZATION, HEART, BOTH LEFT AND RIGHT N/A 11/28/2018    Performed by  [facility] at [facility] CATH LAB    CATHETERIZATION, HEART, LEFT Left 12/3/2018    Performed by [facility] at [facility] CATH LAB    HYSTERECTOMY  1982    OOPHORECTOMY      wrist cyst Right 1980s    dorsal wrist cyst       Social History     Tobacco Use    Smoking status: Former Smoker     Packs/day: 1.50     Years: 30.00     Pack years: 45.00     Types: Cigarettes     Last attempt to quit: 1990     Years since quittin.0    Smokeless tobacco: Former User   Substance Use Topics    Alcohol use: No     Alcohol/week: 0.0 oz    Drug use: No       Family History   Problem Relation Age of Onset    Diabetes Mother     Hyperlipidemia Mother     Hypertension Mother     Heart disease Mother         MI    Muscular dystrophy Son     Cancer Maternal Grandmother         colon       Current Outpatient Medications   Medication Sig    aspirin (ECOTRIN) 81 MG EC tablet Take 1 tablet (81 mg total) by mouth once daily.    atorvastatin (LIPITOR) 80 MG tablet Take 1 tablet (80 mg total) by mouth once daily.    blood sugar diagnostic Strp 1 strip by Misc.(Non-Drug; Combo Route) route 3 (three) times daily. relion ultima    calcifediol (RAYALDEE) 30 mcg Cs24 Take 30 mcg by mouth once daily.    clopidogrel (PLAVIX) 75 mg tablet Take 1 tablet (75 mg total) by mouth once daily.    insulin NPH-insulin regular, 70/30, (NOVOLIN 70/30) 100 unit/mL (70-30) injection INJECT SUBCUTANEOUSLY 30 UNITS IN THE MORNING AND INJECT 25 UNITS IN THE EVENING (Patient taking differently: INJECT SUBCUTANEOUSLY 30 UNITS IN THE MORNING AND INJECT 25 UNITS IN THE EVENING)    isosorbide-hydrALAZINE 20-37.5 mg (BIDIL) 20-37.5 mg Tab Take 1 tablet by mouth 3 (three) times daily.    lancets (ACCU-CHEK SOFTCLIX LANCETS) Misc 1 lancet by Misc.(Non-Drug; Combo Route) route 3 (three) times daily.    metoprolol tartrate (LOPRESSOR) 25 MG tablet Take 1 tablet (25 mg total) by mouth 2 (two) times daily.    sodium bicarbonate 325 MG tablet  TAKE ONE TABLET BY MOUTH THREE TIMES DAILY    torsemide (DEMADEX) 20 MG Tab Take 1 tablet (20 mg total) by mouth once daily.    nitroGLYCERIN (NITROSTAT) 0.4 MG SL tablet Place 1 tablet (0.4 mg total) under the tongue every 5 (five) minutes as needed for Chest pain.     No current facility-administered medications for this visit.      Current Outpatient Medications on File Prior to Visit   Medication Sig    aspirin (ECOTRIN) 81 MG EC tablet Take 1 tablet (81 mg total) by mouth once daily.    atorvastatin (LIPITOR) 80 MG tablet Take 1 tablet (80 mg total) by mouth once daily.    blood sugar diagnostic Strp 1 strip by Misc.(Non-Drug; Combo Route) route 3 (three) times daily. relion ultima    calcifediol (RAYALDEE) 30 mcg Cs24 Take 30 mcg by mouth once daily.    clopidogrel (PLAVIX) 75 mg tablet Take 1 tablet (75 mg total) by mouth once daily.    insulin NPH-insulin regular, 70/30, (NOVOLIN 70/30) 100 unit/mL (70-30) injection INJECT SUBCUTANEOUSLY 30 UNITS IN THE MORNING AND INJECT 25 UNITS IN THE EVENING (Patient taking differently: INJECT SUBCUTANEOUSLY 30 UNITS IN THE MORNING AND INJECT 25 UNITS IN THE EVENING)    isosorbide-hydrALAZINE 20-37.5 mg (BIDIL) 20-37.5 mg Tab Take 1 tablet by mouth 3 (three) times daily.    lancets (ACCU-CHEK SOFTCLIX LANCETS) Misc 1 lancet by Misc.(Non-Drug; Combo Route) route 3 (three) times daily.    metoprolol tartrate (LOPRESSOR) 25 MG tablet Take 1 tablet (25 mg total) by mouth 2 (two) times daily.    sodium bicarbonate 325 MG tablet TAKE ONE TABLET BY MOUTH THREE TIMES DAILY    torsemide (DEMADEX) 20 MG Tab Take 1 tablet (20 mg total) by mouth once daily.    nitroGLYCERIN (NITROSTAT) 0.4 MG SL tablet Place 1 tablet (0.4 mg total) under the tongue every 5 (five) minutes as needed for Chest pain.     No current facility-administered medications on file prior to visit.      Review of patient's allergies indicates:   Allergen Reactions    Codeine Swelling    Darvon  "[propoxyphene] Swelling       ROS  Constitution: Positive for weakness and malaise/fatigue. Negative for diaphoresis and weight gain.   HENT: Negative for hoarse voice.    Eyes: Negative for double vision and visual disturbance.   Cardiovascular: Negative for chest pain, claudication, cyanosis, dyspnea on exertion, irregular heartbeat, leg swelling, near-syncope, orthopnea, palpitations, paroxysmal nocturnal dyspnea and syncope.   Respiratory: Negative for cough, hemoptysis, shortness of breath and snoring.    Hematologic/Lymphatic: Negative for bleeding problem. Does not bruise/bleed easily.   Skin: Negative for color change and poor wound healing.   Musculoskeletal: Negative for muscle cramps, muscle weakness and myalgias.   Gastrointestinal: Negative for bloating, abdominal pain, change in bowel habit, diarrhea, heartburn, hematemesis, hematochezia, melena and nausea.   Neurological: Negative for excessive daytime sleepiness, dizziness, headaches, light-headedness, loss of balance and numbness.   Psychiatric/Behavioral: Negative for memory loss. The patient does not have insomnia.    Allergic/Immunologic: Negative for hives.   Objective:   Physical Exam  Vitals:    01/24/19 0858 01/24/19 0938   BP: (!) 170/66 (!) 150/70   BP Location: Left arm    Patient Position: Sitting    BP Method: Medium (Manual)    Pulse: 66    Weight: 65.6 kg (144 lb 11.7 oz)    Height: 5' 5" (1.651 m)    Constitutional: She is oriented to person, place, and time. She appears well-developed and well-nourished. She does not appear ill. No distress.   HENT:   Head: Normocephalic and atraumatic.   Eyes: EOM are normal. Pupils are equal, round, and reactive to light. No scleral icterus.   Neck: Normal range of motion. Neck supple. Normal carotid pulses, no hepatojugular reflux and no JVD present. Carotid bruit is not present. No tracheal deviation present. No thyromegaly present.   Cardiovascular: Normal rate, regular rhythm and normal pulses. " Exam reveals no gallop and no friction rub.   Murmur heard.  High-pitched blowing holosystolic murmur is present with a grade of 1/6 at the apex.   Harsh midsystolic murmur of grade 2/6 is also present at the upper right sternal border radiating to the neck.  Pulses:       Carotid pulses are 2+ on the right side with bruit, and 2+ on the left side with bruit.       Radial pulses are 2+ on the right side, and 2+ on the left side.        Femoral pulses are 2+ on the right side, and 2+ on the left side.       Popliteal pulses are 2+ on the right side, and 2+ on the left side.        Dorsalis pedis pulses are 2+ on the right side, and 2+ on the left side.        Posterior tibial pulses are 2+ on the right side, and 2+ on the left side.   Pulmonary/Chest: Effort normal and breath sounds normal. No respiratory distress. She has no wheezes. She has no rhonchi. She has no rales. She exhibits no tenderness.   Abdominal: Soft. Normal appearance, normal aorta and bowel sounds are normal. She exhibits no abdominal bruit, no ascites and no pulsatile midline mass. There is no hepatomegaly. There is no tenderness.   Musculoskeletal: She exhibits no edema.    Neurological: She is alert and oriented to person, place, and time. She has normal strength. No cranial nerve deficit. Coordination normal.   Skin: Skin is warm and dry. No rash noted. She is not diaphoretic. No cyanosis or erythema. Nails show no clubbing.   Psychiatric: She has a normal mood and affect. Her speech is normal and behavior is normal.   Nursing note and vitals reviewed.     Lab Results   Component Value Date    CHOL 173 11/27/2018    CHOL 190 06/12/2018    CHOL 176 06/23/2017     Lab Results   Component Value Date    HDL 58 11/27/2018    HDL 56 06/12/2018    HDL 40 06/23/2017     Lab Results   Component Value Date    LDLCALC 97.6 11/27/2018    LDLCALC 100.4 06/12/2018    LDLCALC 110.6 06/23/2017     Lab Results   Component Value Date    TRIG 87 11/27/2018     TRIG 168 (H) 06/12/2018    TRIG 127 06/23/2017     Lab Results   Component Value Date    CHOLHDL 33.5 11/27/2018    CHOLHDL 29.5 06/12/2018    CHOLHDL 22.7 06/23/2017       Chemistry        Component Value Date/Time     12/10/2018 1210    K 4.7 12/10/2018 1210     12/10/2018 1210    CO2 24 12/10/2018 1210    BUN 46 (H) 12/10/2018 1210    CREATININE 3.8 (H) 12/10/2018 1210     (H) 12/10/2018 1210        Component Value Date/Time    CALCIUM 9.3 12/10/2018 1210    ALKPHOS 84 11/26/2018 1807    AST 25 11/26/2018 1807    ALT 13 11/26/2018 1807    BILITOT 0.8 11/26/2018 1807    ESTGFRAFRICA 13 (A) 12/10/2018 1210    EGFRNONAA 11 (A) 12/10/2018 1210          Lab Results   Component Value Date    TSH 2.438 06/12/2018     Lab Results   Component Value Date    INR 0.9 11/26/2018     Lab Results   Component Value Date    WBC 6.52 12/04/2018    HGB 9.4 (L) 12/04/2018    HCT 28.1 (L) 12/04/2018    MCV 90 12/04/2018     12/04/2018     BMP  Sodium   Date Value Ref Range Status   12/10/2018 141 136 - 145 mmol/L Final     Potassium   Date Value Ref Range Status   12/10/2018 4.7 3.5 - 5.1 mmol/L Final     Chloride   Date Value Ref Range Status   12/10/2018 106 95 - 110 mmol/L Final     CO2   Date Value Ref Range Status   12/10/2018 24 23 - 29 mmol/L Final     BUN, Bld   Date Value Ref Range Status   12/10/2018 46 (H) 8 - 23 mg/dL Final     Creatinine   Date Value Ref Range Status   12/10/2018 3.8 (H) 0.5 - 1.4 mg/dL Final     Calcium   Date Value Ref Range Status   12/10/2018 9.3 8.7 - 10.5 mg/dL Final     Anion Gap   Date Value Ref Range Status   12/10/2018 11 8 - 16 mmol/L Final     eGFR if    Date Value Ref Range Status   12/10/2018 13 (A) >60 mL/min/1.73 m^2 Final     eGFR if non    Date Value Ref Range Status   12/10/2018 11 (A) >60 mL/min/1.73 m^2 Final     Comment:     Calculation used to obtain the estimated glomerular filtration  rate (eGFR) is the CKD-EPI equation.         CrCl cannot be calculated (Patient's most recent lab result is older than the maximum 7 days allowed.).    Assessment:     1. Hypertension associated with diabetes    2. Controlled type 2 diabetes mellitus with diabetic polyneuropathy, with long-term current use of insulin    3. Anemia in stage 4 chronic kidney disease    4. Pure hypercholesterolemia    5. Chronic kidney disease (CKD) stage G4/A3, severely decreased glomerular filtration rate (GFR) between 15-29 mL/min/1.73 square meter and albuminuria creatinine ratio greater than 300 mg/g    6. Proteinuria, unspecified type    7. 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, with long-term current use of insulin    8. Bilateral carotid artery disease, unspecified type    9. Type 2 diabetes mellitus with diabetic peripheral angiopathy without gangrene, with long-term current use of insulin    10. History of CVA (cerebrovascular accident)    11. Diabetes mellitus with stage 4 chronic kidney disease GFR 15-29    12. Hyperlipidemia associated with type 2 diabetes mellitus    13. NSTEMI (non-ST elevated myocardial infarction)    14. Pulmonary hypertension    15. Coronary artery disease involving native coronary artery of native heart with angina pectoris with documented spasm    16. Acute on chronic systolic congestive heart failure      Her bp is reasonable now after waiting for the meds to lucy effects. However I would like he rto profile her bp at [facility] with her alexne and bring it with her so we can reevaluate in 2 weeks.   Plan:   As per above f/u with mid level in 2 weeks with bp machine and reading

## 2019-02-05 ENCOUNTER — OFFICE VISIT (OUTPATIENT)
Dept: OPHTHALMOLOGY | Facility: CLINIC | Age: 74
End: 2019-02-05
Payer: MEDICARE

## 2019-02-05 DIAGNOSIS — E11.3553 TYPE 2 DIABETES MELLITUS WITH STABLE PROLIFERATIVE RETINOPATHY OF BOTH EYES, WITH LONG-TERM CURRENT USE OF INSULIN: Primary | ICD-10-CM

## 2019-02-05 DIAGNOSIS — Z79.4 TYPE 2 DIABETES MELLITUS WITH STABLE PROLIFERATIVE RETINOPATHY OF BOTH EYES, WITH LONG-TERM CURRENT USE OF INSULIN: Primary | ICD-10-CM

## 2019-02-05 DIAGNOSIS — H04.129 DRYNESS, EYE: ICD-10-CM

## 2019-02-05 DIAGNOSIS — H35.3221 EXUDATIVE AGE-RELATED MACULAR DEGENERATION OF LEFT EYE WITH ACTIVE CHOROIDAL NEOVASCULARIZATION: ICD-10-CM

## 2019-02-05 PROCEDURE — 67028 PR INJECT INTRAVITREAL PHARMCOLOGIC: ICD-10-PCS | Mod: LT,S$GLB,, | Performed by: OPHTHALMOLOGY

## 2019-02-05 PROCEDURE — 92014 COMPRE OPH EXAM EST PT 1/>: CPT | Mod: 25,S$GLB,, | Performed by: OPHTHALMOLOGY

## 2019-02-05 PROCEDURE — 99499 UNLISTED E&M SERVICE: CPT | Mod: S$GLB,,, | Performed by: OPHTHALMOLOGY

## 2019-02-05 PROCEDURE — 92134 CPTRZ OPH DX IMG PST SGM RTA: CPT | Mod: S$GLB,,, | Performed by: OPHTHALMOLOGY

## 2019-02-05 PROCEDURE — 92014 PR EYE EXAM, EST PATIENT,COMPREHESV: ICD-10-PCS | Mod: 25,S$GLB,, | Performed by: OPHTHALMOLOGY

## 2019-02-05 PROCEDURE — 99499 RISK ADDL DX/OHS AUDIT: ICD-10-PCS | Mod: S$GLB,,, | Performed by: OPHTHALMOLOGY

## 2019-02-05 PROCEDURE — 67028 INJECTION EYE DRUG: CPT | Mod: LT,S$GLB,, | Performed by: OPHTHALMOLOGY

## 2019-02-05 PROCEDURE — 99999 PR PBB SHADOW E&M-EST. PATIENT-LVL III: CPT | Mod: PBBFAC,,, | Performed by: OPHTHALMOLOGY

## 2019-02-05 PROCEDURE — 92134 POSTERIOR SEGMENT OCT RETINA (OCULAR COHERENCE TOMOGRAPHY)-BOTH EYES: ICD-10-PCS | Mod: S$GLB,,, | Performed by: OPHTHALMOLOGY

## 2019-02-05 PROCEDURE — 99999 PR PBB SHADOW E&M-EST. PATIENT-LVL III: ICD-10-PCS | Mod: PBBFAC,,, | Performed by: OPHTHALMOLOGY

## 2019-02-05 RX ORDER — POLYMYXIN B SULFATE AND TRIMETHOPRIM 1; 10000 MG/ML; [USP'U]/ML
1 SOLUTION OPHTHALMIC 4 TIMES DAILY
Qty: 10 ML | Refills: 0 | Status: SHIPPED | OUTPATIENT
Start: 2019-02-05 | End: 2019-03-15 | Stop reason: ALTCHOICE

## 2019-02-05 RX ADMIN — Medication 1.25 MG: at 09:02

## 2019-02-05 NOTE — PATIENT INSTRUCTIONS
Post Intravitreal Injection Instructions  Below are some guidelines for what to expect after your treatment and additional care instructions.   You may experience mild discomfort in your eye after injection.  Usually your eye feels better within 24-48 hours after the injection.     You may get a gritty, burning, irritating or stinging feeling in the injected eye as a result of the antiseptic used.  Use artificial tears or eye lubricant to reduce the sensation (systane, refresh, genteal, systane gel, genteal gel, refresh pm).  These are available over the counter from your local pharmacy.  If you already have some at home, be sure to check the expiration date to avoid contaminating your eye.  A cool pack over your eye brow above the injected eye may also relieve discomfort.     You have been given medication to numb the surface of your eye.  DO NOT rub or touch your eye, DO NOT wear contacts until numbness goes away.   You may experience small spots that appear in your field of vision.  These are usually caused by an air bubble or from the medication.  It takes several hours or days for these to go away.   You will have a bright red blood spot on the white part of your eye where the injection/anesthetic was given.  This is normal, and can even cover the entire white part of your eye.  This is just a bruise, and will fade away the same as a bruise; it may take a week or two.   DO NOT swim or put sink water (tap water) in your eyes for at least 4 days after injection. It is ok to wash your face and eyelids with a cloth.  It is ok to shower.   An antibiotic prescription has been given, use 1 drop in the injected eye 4 times daily for 4 days.   CALL with any PAIN!  Office [phone]   After hours call [phone]

## 2019-02-05 NOTE — PROGRESS NOTES
===============================  02/05/2019   Avril Monzon,   74 y.o. female   Last visit [facility]: :11/5/2018   Last visit eye dept. Visit date not found  VA:  Corrected distance visual acuity was NLP in the right eye and 20/400 in the left eye.  Tonometry     Tonometry (Applanation, 9:26 AM)       Right Left    Pressure 41 14              Wearing Rx     Wearing Rx       Sphere Cylinder Axis Add    Right        Left +1.00 +0.75 135 +3.50    Age:  6yrs    Type:  PAL              Manifest Refraction     Manifest Refraction       Sphere Cylinder Axis Dist VA    Right        Left +1.50 +0.50 180 20/400              Chief Complaint   Patient presents with    pdr     pt states that her os va has been very blurry and foggy since Christmas    heart attack     pt had heart attack in November        HPI     pdr      Additional comments: pt states that her os va has been very blurry and   foggy since Christmas              heart attack      Additional comments: pt had heart attack in November              Comments     Noncompliant with visits    1.) DM SINCE 1995  PDR  S/P LASER AND INJECTIONS OS(DR BOX AND AT [facility])  2.) NLP OD SINCE BIRTH  3.) DENSE CAT OD / 2+NS OS  4.) Asteroid Hyalosis  5.)iop 42 od 11/5/18  EYLEA OS 1/4/17          Last edited by JOSE Alegre on 2/5/2019  9:13 AM. (History)          ________________  2/5/2019  Problem List Items Addressed This Visit        Eye/Vision problems    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, with long-term current use of insulin - Primary    Relevant Orders    Posterior Segment OCT Retina-Both eyes (Completed)    Exudative age-related macular degeneration of left eye with active choroidal neovascularization    Relevant Medications    bevacizumab (AVASTIN) 2.5 mg/0.10 mL injection 1.25 mg (Completed)    polymyxin B sulf-trimethoprim (POLYTRIM) 10,000 unit- 1 mg/mL Drop    Other Relevant Orders    Posterior Segment OCT  Retina-Both eyes (Completed)    Prior Authorization Order       Other    Dryness, eye        Stable DR  OS new PMB SRN  Needs Avastin OS emergent today and continue to txx  Recent MI -     Recommend frequent at's.  2/5/2019  Diagnosis :  srn os  Today:   Avastin (Bevacizumab) 1.25 mg/0.05 ml Intravitreal Injection , OS   Follow up: 1 mon avastin os        Instructed to call 24/7 for any worsening of vision. Check Both eyes daily. Gave patient my home phone number.      Procedure  Note:   OS}  Avastin (Bevacizumab) 1.25 mg/0.05 ml Intravitreal Injection    I have explained the Risks, Benefits and Alternatives of the procedure in detail.  The patient voices understanding and all questions have been answered.  The patient agrees to proceed as discussed.  Xylocaine with Epi 2%  subconj bleb  was used for anesthesia.  Topical betadine was used for antisepsis.  0.05 cc was  injected 3.7 mm from corneal limbus in the inferotemporal quadrant.  Following injection the IOP was less than thirty (<30) by tonopen.  The eye was then thoroughly irrigated with BSS.  Patient tolerated procedure well.  No complications were observed.  The Patient was educated that mild irritation tonight was normal secondary to topical antispsis use.  Pt was advised to call at any time day or night for pain, redness, or any decline in vision. I gave the patient my home number as well as the clinic on call number. Daily visual checks and Amsler grid testing were reviewed.  polytrim Antibiotic Drops to be used 4 times daily for 4 days  JADIEL Coello MD  Procedure ordered: y  Consent: y  Pre auth: y  MAR:y  Opnote: y  Charge capture:y           ===========================

## 2019-02-12 ENCOUNTER — CLINICAL SUPPORT (OUTPATIENT)
Dept: CARDIOLOGY | Facility: CLINIC | Age: 74
End: 2019-02-12
Payer: MEDICARE

## 2019-02-12 ENCOUNTER — OFFICE VISIT (OUTPATIENT)
Dept: CARDIOLOGY | Facility: CLINIC | Age: 74
End: 2019-02-12
Payer: MEDICARE

## 2019-02-12 VITALS
BODY MASS INDEX: 23.76 KG/M2 | WEIGHT: 142.63 LBS | SYSTOLIC BLOOD PRESSURE: 140 MMHG | HEIGHT: 65 IN | HEART RATE: 68 BPM | DIASTOLIC BLOOD PRESSURE: 62 MMHG

## 2019-02-12 DIAGNOSIS — I27.20 PULMONARY HYPERTENSION: ICD-10-CM

## 2019-02-12 DIAGNOSIS — R07.89 ATYPICAL CHEST PAIN: Primary | ICD-10-CM

## 2019-02-12 DIAGNOSIS — E78.5 HYPERLIPIDEMIA ASSOCIATED WITH TYPE 2 DIABETES MELLITUS: ICD-10-CM

## 2019-02-12 DIAGNOSIS — I15.2 HYPERTENSION ASSOCIATED WITH DIABETES: Chronic | ICD-10-CM

## 2019-02-12 DIAGNOSIS — E11.69 HYPERLIPIDEMIA ASSOCIATED WITH TYPE 2 DIABETES MELLITUS: ICD-10-CM

## 2019-02-12 DIAGNOSIS — I25.111 CORONARY ARTERY DISEASE INVOLVING NATIVE CORONARY ARTERY OF NATIVE HEART WITH ANGINA PECTORIS WITH DOCUMENTED SPASM: ICD-10-CM

## 2019-02-12 DIAGNOSIS — I50.23 ACUTE ON CHRONIC SYSTOLIC CONGESTIVE HEART FAILURE: ICD-10-CM

## 2019-02-12 DIAGNOSIS — N18.4 CKD (CHRONIC KIDNEY DISEASE), STAGE IV: ICD-10-CM

## 2019-02-12 DIAGNOSIS — E11.59 HYPERTENSION ASSOCIATED WITH DIABETES: Chronic | ICD-10-CM

## 2019-02-12 DIAGNOSIS — R07.89 ATYPICAL CHEST PAIN: ICD-10-CM

## 2019-02-12 DIAGNOSIS — Z86.73 HISTORY OF CVA (CEREBROVASCULAR ACCIDENT): ICD-10-CM

## 2019-02-12 PROCEDURE — 1101F PT FALLS ASSESS-DOCD LE1/YR: CPT | Mod: CPTII,S$GLB,, | Performed by: PHYSICIAN ASSISTANT

## 2019-02-12 PROCEDURE — 99999 PR PBB SHADOW E&M-EST. PATIENT-LVL IV: ICD-10-PCS | Mod: PBBFAC,,, | Performed by: PHYSICIAN ASSISTANT

## 2019-02-12 PROCEDURE — 99214 OFFICE O/P EST MOD 30 MIN: CPT | Mod: S$GLB,,, | Performed by: PHYSICIAN ASSISTANT

## 2019-02-12 PROCEDURE — 3077F SYST BP >= 140 MM HG: CPT | Mod: CPTII,S$GLB,, | Performed by: PHYSICIAN ASSISTANT

## 2019-02-12 PROCEDURE — 3044F HG A1C LEVEL LT 7.0%: CPT | Mod: CPTII,S$GLB,, | Performed by: PHYSICIAN ASSISTANT

## 2019-02-12 PROCEDURE — 93000 EKG 12-LEAD: ICD-10-PCS | Mod: S$GLB,,, | Performed by: INTERNAL MEDICINE

## 2019-02-12 PROCEDURE — 3077F PR MOST RECENT SYSTOLIC BLOOD PRESSURE >= 140 MM HG: ICD-10-PCS | Mod: CPTII,S$GLB,, | Performed by: PHYSICIAN ASSISTANT

## 2019-02-12 PROCEDURE — 99499 RISK ADDL DX/OHS AUDIT: ICD-10-PCS | Mod: S$GLB,,, | Performed by: PHYSICIAN ASSISTANT

## 2019-02-12 PROCEDURE — 3078F PR MOST RECENT DIASTOLIC BLOOD PRESSURE < 80 MM HG: ICD-10-PCS | Mod: CPTII,S$GLB,, | Performed by: PHYSICIAN ASSISTANT

## 2019-02-12 PROCEDURE — 99999 PR PBB SHADOW E&M-EST. PATIENT-LVL IV: CPT | Mod: PBBFAC,,, | Performed by: PHYSICIAN ASSISTANT

## 2019-02-12 PROCEDURE — 3044F PR MOST RECENT HEMOGLOBIN A1C LEVEL <7.0%: ICD-10-PCS | Mod: CPTII,S$GLB,, | Performed by: PHYSICIAN ASSISTANT

## 2019-02-12 PROCEDURE — 3078F DIAST BP <80 MM HG: CPT | Mod: CPTII,S$GLB,, | Performed by: PHYSICIAN ASSISTANT

## 2019-02-12 PROCEDURE — 99499 UNLISTED E&M SERVICE: CPT | Mod: S$GLB,,, | Performed by: PHYSICIAN ASSISTANT

## 2019-02-12 PROCEDURE — 1101F PR PT FALLS ASSESS DOC 0-1 FALLS W/OUT INJ PAST YR: ICD-10-PCS | Mod: CPTII,S$GLB,, | Performed by: PHYSICIAN ASSISTANT

## 2019-02-12 PROCEDURE — 93000 ELECTROCARDIOGRAM COMPLETE: CPT | Mod: S$GLB,,, | Performed by: INTERNAL MEDICINE

## 2019-02-12 PROCEDURE — 99214 PR OFFICE/OUTPT VISIT, EST, LEVL IV, 30-39 MIN: ICD-10-PCS | Mod: S$GLB,,, | Performed by: PHYSICIAN ASSISTANT

## 2019-02-12 NOTE — PROGRESS NOTES
Subjective:    Patient ID:  Avril Monzon is a 74 y.o. female who presents for follow-up of HTN      HPI   Ms. Monzon is a 74 year old female patient with a PMHx of HTN, hyperlipidemia, DM type II, former tobacco abuse, CKD, anemia, and CVA who presents today for follow-up. Patient recently seen by Dr. Holman and started on Bidil. She returns today and states she is doing well. No real complaints. Does report some intermittent atypical left-sided chest pain, seems to occur when she lies on her left side at night. Lasts a few seconds to minutes and then resolves. No other associated symptoms. Feels better if she lies on her right side. No exertional symptoms. No symptoms similar to what she experienced prior to NSTEMI.  No palpitations, near syncope, or syncope. Occasional lightheadedness when when she puts her head down. Stable ALVARADO, unchanged from her last visit with Dr. Holman. BP improved today, states has been running lower at home. Did not bring her log. Being mindful of her salt intake. Scheduled to see nephrology on 2/27/19. EKG today shows NSR, normal EKG.    Review of Systems   Constitution: Negative for chills, decreased appetite, fever, weakness and malaise/fatigue.   HENT: Negative for congestion, hoarse voice and sore throat.    Eyes: Negative for blurred vision and discharge.   Cardiovascular: Positive for dyspnea on exertion (stable). Negative for chest pain, claudication, cyanosis, irregular heartbeat, leg swelling, near-syncope, orthopnea, palpitations and paroxysmal nocturnal dyspnea.        Atypical chest discomfort     Respiratory: Negative for cough, hemoptysis, shortness of breath, snoring, sputum production and wheezing.    Endocrine: Negative for cold intolerance and heat intolerance.   Hematologic/Lymphatic: Negative for bleeding problem. Does not bruise/bleed easily.   Skin: Negative for rash.   Musculoskeletal: Negative for arthritis, back pain, joint pain, joint swelling, muscle cramps,  "muscle weakness and myalgias.   Gastrointestinal: Negative for abdominal pain, constipation, diarrhea, heartburn, melena and nausea.   Genitourinary: Negative for hematuria.   Neurological: Negative for dizziness, focal weakness, headaches, light-headedness, loss of balance, numbness, paresthesias and seizures.   Psychiatric/Behavioral: Negative for memory loss. The patient does not have insomnia.    Allergic/Immunologic: Negative for hives.       BP (!) 140/62 (BP Location: Left arm, Patient Position: Sitting, BP Method: Medium (Manual))   Pulse 68   Ht 5' 5" (1.651 m)   Wt 64.7 kg (142 lb 10.2 oz)   BMI 23.74 kg/m²     Objective:    Physical Exam   Constitutional: She is oriented to person, place, and time. She appears well-developed and well-nourished. No distress.   HENT:   Head: Normocephalic and atraumatic.   Eyes: Pupils are equal, round, and reactive to light. Right eye exhibits no discharge. Left eye exhibits no discharge.   Neck: Neck supple. No JVD present.   Cardiovascular: Normal rate, regular rhythm, S1 normal and S2 normal.   Murmur heard.  High-pitched blowing holosystolic murmur is present at the apex.  Pulmonary/Chest: Effort normal and breath sounds normal. No respiratory distress. She has no wheezes. She has no rales.   Abdominal: Soft. She exhibits no distension. There is no tenderness. There is no rebound.   Musculoskeletal: She exhibits no edema.   Neurological: She is alert and oriented to person, place, and time.   Skin: Skin is warm and dry. She is not diaphoretic. No erythema.   Psychiatric: She has a normal mood and affect. Her behavior is normal. Thought content normal.   Nursing note and vitals reviewed.      D. Angiographic Results     Diagnostic:          Patient has a right dominant coronary artery.        - Left Main Coronary Artery:             The LM is normal. There is GRANT 3 flow.     - Left Anterior Descending Artery:             The proximal LAD has a 95% stenosis. There " is GRANT 3 flow.                     Lesion Details:   The length is 10mm, eccentric shape, moderate proximal tortuosity, segment angulation of 45-90 degrees, irregular contour, moderate to heavy calcification.     - Left Circumflex Artery:             The ostial LCX has a 50% stenosis. There is GRANT 3 flow. THE VESSEL IS TRTUOUS AND CALCIFIED. THERE WAS SUPERIMPOSED SPASM THAT HAS RESOLVED.     - Right Coronary Artery:             The RCA was not studied.     - Left External Iliac Artery:             The left EIA is normal.     - Left Common Femoral Artery:             The left CFA is normal.     - Left Superficial Femoral Artery:             The left SFA is normal.     - Left Profunda Femoral Artery:             The left PFA is normal.      Intervention          Proximal LAD:              The lesion was successfully intervened. Post-stenosis of 0% and post-GRANT 3 flow. The vessel was accessed natively.  The following items were used: Diamondback Coronary, Blln Quantum Cogswell 2.0 X 30 and Stent Resolute Rx 3.50x15 (ANTHONY).  Assessment:       1. Hypertension associated with diabetes    2. History of CVA (cerebrovascular accident)    3. Pulmonary hypertension    4. Acute on chronic systolic congestive heart failure    5. Coronary artery disease involving native coronary artery of native heart with angina pectoris with documented spasm    6. Hyperlipidemia associated with type 2 diabetes mellitus    7. GREGG on CKD, stage IV      Patient who presents for f/ollow-up and BP check. BP slightly above goal but improved from recent visit. Patient also reports it has been 120's-130's at times at home, did not bring log today. Will have her continue to monitor at home and do phone review in 2 weeks to decide if any further med adjustments are needed. Cardiac wise, appears stable. Complains of some atypical positional left-sided chest pain-occurs randomly without any associated symptoms. Will continue same meds/mgmt and monitor for  now. Patient knows to contact clinic if symptoms worsen.   Plan:   -Continue current medical management and risk factor modification  -Cardiac, low salt diet  -Log BP 1-2 x daily over next 2 weeks, phone review  -Call clinic if any worsening  -RTC 3 months with Dr. Holman    Chart reviewed. Dr. Holman agrees with plan as outlined above.

## 2019-02-22 NOTE — ASSESSMENT & PLAN NOTE
- Cardiology following  - Serial troponin 4.417>>15.86>>16.308  - Continue ASA, Statin, BB  - 2D ECHO with EF 45-50% with diastolic dysfunction and pulmonary HTN  - LHC today showed Lad prox 90% mid 50% lcx prox 50% rca diffusely diseased 70% normal filling pressures moderate pulmonary htn. She is at risk of contrast induced nephropathy post LHC  - Cardiology recommending CABG vs LAD arthrectomy stent  - CVT consulted . Recommend  No surgery due to high risk

## 2019-02-22 NOTE — PROGRESS NOTES
[facility] Hospital Medicine  Progress Note    Patient Name: Avril Monzon  MRN: [id number]  Patient Class: IP- Inpatient   Admission Date: 11/26/2018  Length of Stay: 8 days  Attending Physician: No att. providers found  Primary Care Provider: Rose Parks MD        Subjective:     Principal Problem:NSTEMI (non-ST elevated myocardial infarction)    HPI:  Ms. Monzon is a 74 yo female with a PMHx of HTN, HLD, DM II, CKD stage IV, anemia of CKD, and h/o CVA.  She presented to the ED with c/o SOB that has progressively worsened since yesterday.  Associated nonproductive cough and subjective fever.  No aggravating or alleviating factors.  Denies any CP, palpitations, diaphoresis, N/V, orthopnea, PND, edema, weight gain, ABD pain, diarrhea, dysuria, decreased UOP, hematuria, back pain, HA, AMS, lightheadedness, dizziness, syncope, weakness, numbness, or chills.  Per EMS, patient hypoxic on arrival to scene with O2 sat 80% on RA.  She was given Duoneb x 1 and placed on 4L NC with improved O2 sat of 94%.  Upon arrival to ED, patient's O2 sat 82% on 4L NC.  She was placed on BiPAP with increased O2 sat of 100%.  Work-up in ED resulted cr. 4.1, BUN 69, glucose 336, BNP 1613, troponin 4.417, lactic 2.7 > 1.5.  ABG with pH 7.324, pCO2 31.5, pO2 144, HCO3 16.4, BE -10 on BiPAP.  CXR showed pulmonary vascular congestion, pulmonary edema, and small bilateral pleural effusions.  EKG showed normal sinus rhythm with inferior ST depression, no previous to compare.  ASA 325mg, IV lopressor 5mg, and IV Lasix 120mg given per ED.  Case discussed with Cardiology in ED and IV heparin drip initiated.  Hospital Medicine called for admission.  Currently, patient appears comfortable in NAD.  Reports SOB improved on BiPAP.  Denies any CP.  No h/o CAD.  Denies having stress test in the past.  2D echo in 6/2018 showed LVEF of 60-65%, normal diastolic function, and no significant valvular abnormalities.      Hospital  Course:  Pt admitted to Inpatient status for NSTEMI with Troponin significantly elevated.  Cardiology consulted with Heparin infusion initiated.  EKG positive for ST depression.  Pt placed on BIPAP due to respiratory distress and later weaned to high flow oxygen.  Pulmonology consulted.  Chest xray verified pulmonary congestion with positive response to diuresis noted.  BNP elevated. Pt noted to renal failure with Creatinine of 4 and Nephrology consulted.  Baseline creatinine 2.5-3 noted.  Echo results showed EF 45% with diastolic dysfunction, PAP 69, +WMA, Pulm HTN, and mild to moderate MR.  C planned for tomorrow. H/H 8.2/25.3 with 1 unit of PRBCs transfused.  Procalcitonin of 0.66.  Blood culture results pending.  Pt denies acute distress at this time. As of 11/30 R/LHC scheduled today showed Lad prox 90% mid 50% lcx prox 50% rca diffusely diseased 70% normal filling pressures moderate pulmonary htn. Cardiology recommending CABG vs LAD arthrectomy stent. CVT consulted, not amenable to CABG due to severe calcification and high risk of morbidity and mortality. 12/3/18- Awaiting LHC today with intervention of LAD. Denies any chest pain or SOB. 12/4/18- S/p PCI to LAD. Tolerated procedure well. Renal function stable this morning. Plans inplace for OP Nephrology follow up and HD access implant. Patient to follow-up with PCP in 3 days for hospital follow-up of NSTEMI. Patient also to follow-up with cardiology, nephrology, and vascular surgery outpatient. Patient seen and examined on the date of discharge and found suitable for discharge.      Interval History: pt stable on high flow oxygen.  Pt denies any acute distress at this time.  [facility] planned for tomorrow per Cardiology.  PRBCs x 1 unit given.  Cardiology, Pulmonology, and Nephrology following.   11/28- she feels better, remains on vapotherm ,   11/29- denies chest pain   Review of Systems   Constitutional: Positive for activity change and fatigue. Negative for  appetite change and fever.   HENT: Negative for congestion, facial swelling, sore throat, trouble swallowing and voice change.    Eyes: Negative for redness and visual disturbance.   Respiratory: Positive for shortness of breath. Negative for apnea, cough, chest tightness and wheezing.    Cardiovascular: Positive for chest pain (improved). Negative for palpitations and leg swelling.   Gastrointestinal: Negative for abdominal distention, abdominal pain, blood in stool, constipation, diarrhea, nausea and vomiting.   Genitourinary: Negative for decreased urine volume, difficulty urinating, dysuria, flank pain, frequency, hematuria, pelvic pain and urgency.   Musculoskeletal: Negative for back pain, gait problem and joint swelling.   Skin: Negative for color change and rash.   Neurological: Positive for weakness. Negative for dizziness, syncope and headaches.   Hematological: Does not bruise/bleed easily.   Psychiatric/Behavioral: Negative for agitation, behavioral problems and confusion. The patient is not nervous/anxious.      Objective:     Vital Signs (Most Recent):  Temp: 98.5 °F (36.9 °C) (12/04/18 1134)  Pulse: 70 (12/04/18 1134)  Resp: 18 (12/04/18 1134)  BP: 138/64 (12/04/18 1134)  SpO2: 97 % (12/04/18 1134) Vital Signs (24h Range):        Weight: 68.6 kg (151 lb 3.8 oz)  Body mass index is 25.17 kg/m².  No intake or output data in the 24 hours ending 02/22/19 0835   Physical Exam   Constitutional: She is oriented to person, place, and time. She appears well-developed and well-nourished. No distress.   HENT:   Head: Normocephalic and atraumatic.   On Vapotherm   Eyes: EOM are normal.   Neck: Neck supple.   Cardiovascular: Normal rate and regular rhythm.   Pulmonary/Chest: Tachypnea noted. She has no rales.   -   Abdominal: Soft. There is no tenderness.   Genitourinary:   Genitourinary Comments: Pfeiffer in place   Musculoskeletal: She exhibits edema.   Trace edema bilateral lower extremity   Neurological: She is  alert and oriented to person, place, and time.   Skin: Skin is warm.   Psychiatric: She has a normal mood and affect. Her behavior is normal. Judgment and thought content normal.   Nursing note and vitals reviewed.      Significant Labs:   Blood Culture:   No results for input(s): LABBLOO in the last 48 hours.  CBC:   No results for input(s): WBC, HGB, HCT, PLT in the last 48 hours.  CMP:   No results for input(s): NA, K, CL, CO2, GLU, BUN, CREATININE, CALCIUM, PROT, ALBUMIN, BILITOT, ALKPHOS, AST, ALT, ANIONGAP, EGFRNONAA in the last 48 hours.    Invalid input(s): ESTGFAFRICA  Troponin:   No results for input(s): TROPONINI in the last 48 hours.    Significant Imaging:   Imaging Results          X-Ray Chest AP Portable (Final result)  Result time 11/26/18 17:53:35    Final result by Sebastien Pettit III, MD (11/26/18 17:53:35)                 Impression:      Probable bilateral perihilar edema/congestion although cannot exclude pneumonia.  Follow-up recommended      Electronically signed by: Sebastien Pettit MD  Date:    11/26/2018  Time:    17:53             Narrative:    EXAMINATION:  XR CHEST AP PORTABLE    CLINICAL HISTORY:  Shortness of breath    COMPARISON:  May 2017    FINDINGS:  The heart size is normal.  Extensive bilateral infiltrates greatest in the perihilar Artem greater on the left.  This may be due to pulmonary edema, asymmetric however bilateral pneumonia cannot be excluded.  Follow-up recommended.                              Assessment/Plan:      * NSTEMI (non-ST elevated myocardial infarction)    - Cardiology following  - Serial troponin 4.417>>15.86>>16.308  - Continue ASA, Statin, BB  - 2D ECHO with EF 45-50% with diastolic dysfunction and pulmonary HTN  - [facility] today showed Lad prox 90% mid 50% lcx prox 50% rca diffusely diseased 70% normal filling pressures moderate pulmonary htn. She is at risk of contrast induced nephropathy post [facility]  - Cardiology recommending CABG vs LAD arthrectomy  stent  - CVT consulted . Recommend  No surgery due to high risk        Stage 5 chronic kidney disease    Nephrology on board       GREGG on CKD, stage IV    - Likely cardiorenal syndrome  - Diuresis continued  - strict I&O's  - Avoid nephrotoxic agents.  Hold home ACEi  - Nephrology following   - Baseline creatinine 2.5-3  - She is at risk of contrast induced nephropathy post [facility]  -1/29--will avoid nephrotoxic meds     Hyperlipidemia    - Continue Statin     Acute hypoxemic respiratory failure secondary to acute decompensated HF    - Cardiology following  - Continue Lasix 80 mg IVP BID  - Strict I&O's, daily weights  - Na/fluid restriction.  - 2D echo results showed EF 45% with diastolic dysfunction, PAP 69, +WMA, and mild to moderate MR.   - Cardiology and Pulmonology following  - Weaned off Vapotherm, currently on nasal cannula       Anemia in stage 4 chronic kidney disease    - Hgb/Hct 9.2/27.8 -- stable   - 1 unit of PRBCs transfused on 11/27/18  - Transfuse 1 PRBC  To keep > 9  Per cardiology recommendation  Before [facility]   12/3/18  -Stable        Controlled type 2 diabetes mellitus with diabetic polyneuropathy, with long-term current use of insulin    - HgbA1c 6.7 -- contolled  - Accuchecks with moderate SSI  - Detemir 15 units SQ nightly  -Stable          Constipation    Lactulose  Po x 1   glycerine suppository  X 1   + BM        VTE Risk Mitigation (From admission, onward)        Ordered     IP VTE HIGH RISK PATIENT  Once      11/27/18 1342              Zi Landon MD  Department of Hospital Medicine   [facility]

## 2019-02-22 NOTE — ASSESSMENT & PLAN NOTE
- Likely cardiorenal syndrome  - Diuresis continued  - strict I&O's  - Avoid nephrotoxic agents.  Hold home ACEi  - Nephrology following   - Baseline creatinine 2.5-3  - She is at risk of contrast induced nephropathy post [facility]  -1/29--will avoid nephrotoxic meds

## 2019-02-22 NOTE — SUBJECTIVE & OBJECTIVE
Interval History: pt stable on high flow oxygen.  Pt denies any acute distress at this time.  LHC planned for tomorrow per Cardiology.  PRBCs x 1 unit given.  Cardiology, Pulmonology, and Nephrology following.   11/28- she feels better, remains on vapotherm ,   11/29- denies chest pain   Review of Systems   Constitutional: Positive for activity change and fatigue. Negative for appetite change and fever.   HENT: Negative for congestion, facial swelling, sore throat, trouble swallowing and voice change.    Eyes: Negative for redness and visual disturbance.   Respiratory: Positive for shortness of breath. Negative for apnea, cough, chest tightness and wheezing.    Cardiovascular: Positive for chest pain (improved). Negative for palpitations and leg swelling.   Gastrointestinal: Negative for abdominal distention, abdominal pain, blood in stool, constipation, diarrhea, nausea and vomiting.   Genitourinary: Negative for decreased urine volume, difficulty urinating, dysuria, flank pain, frequency, hematuria, pelvic pain and urgency.   Musculoskeletal: Negative for back pain, gait problem and joint swelling.   Skin: Negative for color change and rash.   Neurological: Positive for weakness. Negative for dizziness, syncope and headaches.   Hematological: Does not bruise/bleed easily.   Psychiatric/Behavioral: Negative for agitation, behavioral problems and confusion. The patient is not nervous/anxious.      Objective:     Vital Signs (Most Recent):  Temp: 98.5 °F (36.9 °C) (12/04/18 1134)  Pulse: 70 (12/04/18 1134)  Resp: 18 (12/04/18 1134)  BP: 138/64 (12/04/18 1134)  SpO2: 97 % (12/04/18 1134) Vital Signs (24h Range):        Weight: 68.6 kg (151 lb 3.8 oz)  Body mass index is 25.17 kg/m².  No intake or output data in the 24 hours ending 02/22/19 0835   Physical Exam   Constitutional: She is oriented to person, place, and time. She appears well-developed and well-nourished. No distress.   HENT:   Head: Normocephalic and  atraumatic.   On Vapotherm   Eyes: EOM are normal.   Neck: Neck supple.   Cardiovascular: Normal rate and regular rhythm.   Pulmonary/Chest: Tachypnea noted. She has no rales.   -   Abdominal: Soft. There is no tenderness.   Genitourinary:   Genitourinary Comments: Pfeiffer in place   Musculoskeletal: She exhibits edema.   Trace edema bilateral lower extremity   Neurological: She is alert and oriented to person, place, and time.   Skin: Skin is warm.   Psychiatric: She has a normal mood and affect. Her behavior is normal. Judgment and thought content normal.   Nursing note and vitals reviewed.      Significant Labs:   Blood Culture:   No results for input(s): LABBLOO in the last 48 hours.  CBC:   No results for input(s): WBC, HGB, HCT, PLT in the last 48 hours.  CMP:   No results for input(s): NA, K, CL, CO2, GLU, BUN, CREATININE, CALCIUM, PROT, ALBUMIN, BILITOT, ALKPHOS, AST, ALT, ANIONGAP, EGFRNONAA in the last 48 hours.    Invalid input(s): ESTGFAFRICA  Troponin:   No results for input(s): TROPONINI in the last 48 hours.    Significant Imaging:   Imaging Results          X-Ray Chest AP Portable (Final result)  Result time 11/26/18 17:53:35    Final result by Sebastien Pettit III, MD (11/26/18 17:53:35)                 Impression:      Probable bilateral perihilar edema/congestion although cannot exclude pneumonia.  Follow-up recommended      Electronically signed by: Sebastien Pettit MD  Date:    11/26/2018  Time:    17:53             Narrative:    EXAMINATION:  XR CHEST AP PORTABLE    CLINICAL HISTORY:  Shortness of breath    COMPARISON:  May 2017    FINDINGS:  The heart size is normal.  Extensive bilateral infiltrates greatest in the perihilar Artem greater on the left.  This may be due to pulmonary edema, asymmetric however bilateral pneumonia cannot be excluded.  Follow-up recommended.

## 2019-02-25 RX ORDER — CALCIFEDIOL 30 UG/1
CAPSULE, EXTENDED RELEASE ORAL
Qty: 30 CAPSULE | Refills: 11 | Status: SHIPPED | OUTPATIENT
Start: 2019-02-25 | End: 2019-02-27 | Stop reason: SDUPTHER

## 2019-02-27 ENCOUNTER — LAB VISIT (OUTPATIENT)
Dept: LAB | Facility: HOSPITAL | Age: 74
End: 2019-02-27
Attending: INTERNAL MEDICINE
Payer: MEDICARE

## 2019-02-27 ENCOUNTER — OFFICE VISIT (OUTPATIENT)
Dept: NEPHROLOGY | Facility: CLINIC | Age: 74
End: 2019-02-27
Payer: MEDICARE

## 2019-02-27 VITALS
DIASTOLIC BLOOD PRESSURE: 72 MMHG | SYSTOLIC BLOOD PRESSURE: 132 MMHG | HEIGHT: 65 IN | WEIGHT: 145.5 LBS | HEART RATE: 60 BPM | BODY MASS INDEX: 24.24 KG/M2

## 2019-02-27 DIAGNOSIS — E11.21 DIABETIC NEPHROPATHY ASSOCIATED WITH TYPE 2 DIABETES MELLITUS: ICD-10-CM

## 2019-02-27 DIAGNOSIS — N18.4 CHRONIC KIDNEY DISEASE, STAGE IV (SEVERE): ICD-10-CM

## 2019-02-27 DIAGNOSIS — N18.5 CHRONIC KIDNEY DISEASE (CKD), STAGE V: ICD-10-CM

## 2019-02-27 DIAGNOSIS — N18.5 ANEMIA OF CHRONIC RENAL FAILURE, STAGE 5: ICD-10-CM

## 2019-02-27 DIAGNOSIS — N18.4 ANEMIA OF CHRONIC RENAL FAILURE, STAGE 4 (SEVERE): ICD-10-CM

## 2019-02-27 DIAGNOSIS — E11.21 NEPHROTIC SYNDROME DUE TO DIABETES MELLITUS: ICD-10-CM

## 2019-02-27 DIAGNOSIS — D63.1 ANEMIA OF CHRONIC RENAL FAILURE, STAGE 5: ICD-10-CM

## 2019-02-27 DIAGNOSIS — I10 ESSENTIAL HYPERTENSION: ICD-10-CM

## 2019-02-27 DIAGNOSIS — N25.81 SECONDARY HYPERPARATHYROIDISM: Primary | ICD-10-CM

## 2019-02-27 DIAGNOSIS — E87.20 METABOLIC ACIDEMIA: ICD-10-CM

## 2019-02-27 DIAGNOSIS — N25.81 SECONDARY HYPERPARATHYROIDISM: ICD-10-CM

## 2019-02-27 DIAGNOSIS — R80.9 PROTEINURIA DUE TO TYPE 2 DIABETES MELLITUS: ICD-10-CM

## 2019-02-27 DIAGNOSIS — E11.29 PROTEINURIA DUE TO TYPE 2 DIABETES MELLITUS: ICD-10-CM

## 2019-02-27 DIAGNOSIS — D63.1 ANEMIA OF CHRONIC RENAL FAILURE, STAGE 4 (SEVERE): ICD-10-CM

## 2019-02-27 DIAGNOSIS — E55.9 VITAMIN D DEFICIENCY: ICD-10-CM

## 2019-02-27 LAB
ALBUMIN SERPL BCP-MCNC: 3.6 G/DL
ANION GAP SERPL CALC-SCNC: 9 MMOL/L
BASOPHILS # BLD AUTO: 0.01 K/UL
BASOPHILS NFR BLD: 0.2 %
BUN SERPL-MCNC: 64 MG/DL
CALCIUM SERPL-MCNC: 9.6 MG/DL
CHLORIDE SERPL-SCNC: 104 MMOL/L
CO2 SERPL-SCNC: 26 MMOL/L
CREAT SERPL-MCNC: 3.6 MG/DL
DIFFERENTIAL METHOD: ABNORMAL
EOSINOPHIL # BLD AUTO: 0.1 K/UL
EOSINOPHIL NFR BLD: 1 %
ERYTHROCYTE [DISTWIDTH] IN BLOOD BY AUTOMATED COUNT: 12.7 %
EST. GFR  (AFRICAN AMERICAN): 14 ML/MIN/1.73 M^2
EST. GFR  (NON AFRICAN AMERICAN): 12 ML/MIN/1.73 M^2
GLUCOSE SERPL-MCNC: 202 MG/DL
HCT VFR BLD AUTO: 26.6 %
HGB BLD-MCNC: 8.7 G/DL
IMM GRANULOCYTES # BLD AUTO: 0.01 K/UL
IMM GRANULOCYTES NFR BLD AUTO: 0.2 %
LYMPHOCYTES # BLD AUTO: 0.8 K/UL
LYMPHOCYTES NFR BLD: 16.6 %
MCH RBC QN AUTO: 30.9 PG
MCHC RBC AUTO-ENTMCNC: 32.7 G/DL
MCV RBC AUTO: 94 FL
MONOCYTES # BLD AUTO: 0.3 K/UL
MONOCYTES NFR BLD: 5.8 %
NEUTROPHILS # BLD AUTO: 3.7 K/UL
NEUTROPHILS NFR BLD: 76.4 %
NRBC BLD-RTO: 0 /100 WBC
PHOSPHATE SERPL-MCNC: 4.5 MG/DL
PLATELET # BLD AUTO: 172 K/UL
PMV BLD AUTO: 9.9 FL
POTASSIUM SERPL-SCNC: 4.7 MMOL/L
PTH-INTACT SERPL-MCNC: 491 PG/ML
RBC # BLD AUTO: 2.82 M/UL
SODIUM SERPL-SCNC: 139 MMOL/L
WBC # BLD AUTO: 4.81 K/UL

## 2019-02-27 PROCEDURE — 99215 PR OFFICE/OUTPT VISIT, EST, LEVL V, 40-54 MIN: ICD-10-PCS | Mod: S$GLB,,, | Performed by: INTERNAL MEDICINE

## 2019-02-27 PROCEDURE — 1101F PT FALLS ASSESS-DOCD LE1/YR: CPT | Mod: CPTII,S$GLB,, | Performed by: INTERNAL MEDICINE

## 2019-02-27 PROCEDURE — 99499 UNLISTED E&M SERVICE: CPT | Mod: S$GLB,,, | Performed by: INTERNAL MEDICINE

## 2019-02-27 PROCEDURE — 85025 COMPLETE CBC W/AUTO DIFF WBC: CPT

## 2019-02-27 PROCEDURE — 99999 PR PBB SHADOW E&M-EST. PATIENT-LVL III: ICD-10-PCS | Mod: PBBFAC,,, | Performed by: INTERNAL MEDICINE

## 2019-02-27 PROCEDURE — 3078F DIAST BP <80 MM HG: CPT | Mod: CPTII,S$GLB,, | Performed by: INTERNAL MEDICINE

## 2019-02-27 PROCEDURE — 3075F SYST BP GE 130 - 139MM HG: CPT | Mod: CPTII,S$GLB,, | Performed by: INTERNAL MEDICINE

## 2019-02-27 PROCEDURE — 99499 RISK ADDL DX/OHS AUDIT: ICD-10-PCS | Mod: S$GLB,,, | Performed by: INTERNAL MEDICINE

## 2019-02-27 PROCEDURE — 3078F PR MOST RECENT DIASTOLIC BLOOD PRESSURE < 80 MM HG: ICD-10-PCS | Mod: CPTII,S$GLB,, | Performed by: INTERNAL MEDICINE

## 2019-02-27 PROCEDURE — 99999 PR PBB SHADOW E&M-EST. PATIENT-LVL III: CPT | Mod: PBBFAC,,, | Performed by: INTERNAL MEDICINE

## 2019-02-27 PROCEDURE — 83970 ASSAY OF PARATHORMONE: CPT

## 2019-02-27 PROCEDURE — 1101F PR PT FALLS ASSESS DOC 0-1 FALLS W/OUT INJ PAST YR: ICD-10-PCS | Mod: CPTII,S$GLB,, | Performed by: INTERNAL MEDICINE

## 2019-02-27 PROCEDURE — 80069 RENAL FUNCTION PANEL: CPT

## 2019-02-27 PROCEDURE — 36415 COLL VENOUS BLD VENIPUNCTURE: CPT

## 2019-02-27 PROCEDURE — 3075F PR MOST RECENT SYSTOLIC BLOOD PRESS GE 130-139MM HG: ICD-10-PCS | Mod: CPTII,S$GLB,, | Performed by: INTERNAL MEDICINE

## 2019-02-27 PROCEDURE — 99215 OFFICE O/P EST HI 40 MIN: CPT | Mod: S$GLB,,, | Performed by: INTERNAL MEDICINE

## 2019-02-27 NOTE — PROGRESS NOTES
Subjective:       Patient ID: Avril Monzon is a 74 y.o.   female who presents for follow-up evaluation of CKD stage 5, HTN, Anemia     Rose Parks MD      HPI : Avril Monzon is a pleasant 74-year-old  woman seen in office today in follow-up for above medical problems. I met her in the hospital about 2 months ago when she was admitted for NSTEMI, she had a left heart catheterization and stent placement.  Progressive decline in renal function reviewed and discussed with the patient.  Recent serum creatinine is 3.6 mg/dL with a GFR of about 14 mL / min.  Chronic kidney disease due to longstanding history of hypertension and diabetes.  Also exposure to IV contrast.  Anemia noted on the recent labs, in the past she was seen Hematology Department, will schedule a follow-up appointment.        Past Medical History:   Diagnosis Date    Acute hypoxemic respiratory failure 11/26/2018    Anemia     Arthritis     back, hand, knees    Back pain     Cataract     CKD stage G4/A3, GFR 15 - 29 and albumin creatinine ratio >300 mg/g     GFR 40% Jan 2014 and 33% ub 3/2014 ([facility])    Coronary artery disease involving native coronary artery of native heart 11/30/2018    Diabetic retinopathy     DM (diabetes mellitus) type II controlled, neurological manifestation     Exudative age-related macular degeneration of left eye with active choroidal neovascularization 2/5/2019    GERD (gastroesophageal reflux disease)     Glaucoma     Heart failure     Hyperlipidemia     Hypertension     NSTEMI (non-ST elevated myocardial infarction) 11/27/2018    Peripheral neuropathy     Polyneuropathy     Proteinuria     >6 mo     Seizures     [facility] 1/2014 -dick CT NRI showed small vessel    Stroke 2012,2014    Tobacco dependence     Type 2 diabetes with peripheral circulatory disorder, controlled          Current Outpatient Medications on File Prior to Visit   Medication Sig Dispense Refill     aspirin (ECOTRIN) 81 MG EC tablet Take 1 tablet (81 mg total) by mouth once daily.  0    atorvastatin (LIPITOR) 80 MG tablet Take 1 tablet (80 mg total) by mouth once daily. 30 tablet 11    blood sugar diagnostic Strp 1 strip by Misc.(Non-Drug; Combo Route) route 3 (three) times daily. relion ultima 100 strip 11    calcifediol (RAYALDEE) 30 mcg Cs24 Take one ccapsule by mouth once daily. 90 capsule 3    clopidogrel (PLAVIX) 75 mg tablet Take 1 tablet (75 mg total) by mouth once daily. 90 tablet 3    insulin NPH-insulin regular, 70/30, (NOVOLIN 70/30) 100 unit/mL (70-30) injection INJECT SUBCUTANEOUSLY 30 UNITS IN THE MORNING AND INJECT 25 UNITS IN THE EVENING (Patient taking differently: INJECT SUBCUTANEOUSLY 30 UNITS IN THE MORNING AND INJECT 25 UNITS IN THE EVENING) 2 vial 3    isosorbide-hydrALAZINE 20-37.5 mg (BIDIL) 20-37.5 mg Tab Take 1 tablet by mouth 3 (three) times daily. 90 tablet 11    lancets (ACCU-CHEK SOFTCLIX LANCETS) Misc 1 lancet by Misc.(Non-Drug; Combo Route) route 3 (three) times daily. 100 each 11    metoprolol tartrate (LOPRESSOR) 25 MG tablet Take 1 tablet (25 mg total) by mouth 2 (two) times daily. 180 tablet 3    nitroGLYCERIN (NITROSTAT) 0.4 MG SL tablet Place 1 tablet (0.4 mg total) under the tongue every 5 (five) minutes as needed for Chest pain. 20 tablet 0    polymyxin B sulf-trimethoprim (POLYTRIM) 10,000 unit- 1 mg/mL Drop Place 1 drop into the left eye 4 (four) times daily. for 4 days 10 mL 0    RAYALDEE 30 mcg Cs24 TAKE 1 CAPSULE ( 30 MG ) BY MOUTH ONCE DAILY 30 capsule 11    sodium bicarbonate 325 MG tablet TAKE ONE TABLET BY MOUTH THREE TIMES DAILY 90 tablet 11    torsemide (DEMADEX) 20 MG Tab Take 1 tablet (20 mg total) by mouth once daily. 90 tablet 3     No current facility-administered medications on file prior to visit.          Review of Systems   Constitutional: Positive for activity change and fatigue. Negative for appetite change and fever.   HENT: Negative  for congestion, facial swelling, sore throat, trouble swallowing and voice change.    Eyes: Negative for redness and visual disturbance.   Respiratory: Negative for apnea, cough, chest tightness, shortness of breath and wheezing.    Cardiovascular: Negative for chest pain, palpitations and leg swelling.   Gastrointestinal: Negative for abdominal distention, abdominal pain, blood in stool, constipation, diarrhea, nausea and vomiting.   Genitourinary: Negative for decreased urine volume, difficulty urinating, dysuria, flank pain, frequency, hematuria, pelvic pain and urgency.   Musculoskeletal: Positive for arthralgias. Negative for back pain, gait problem and joint swelling.   Skin: Negative for color change and rash.   Neurological: Negative for dizziness, syncope, weakness and headaches.   Hematological: Does not bruise/bleed easily.   Psychiatric/Behavioral: Negative for agitation, behavioral problems and confusion. The patient is not nervous/anxious.      :            Objective:         Vitals:    02/27/19 1449   BP: 132/72   Pulse: 60       Weight 145 lbs , previous weight was 155 lb    Physical Exam   Constitutional: She is oriented to person, place, and time. She appears well-developed and well-nourished. No distress.   HENT:   Head: Normocephalic and atraumatic.   Mouth/Throat: Oropharynx is clear and moist. No oropharyngeal exudate.   Eyes: Conjunctivae and EOM are normal. Pupils are equal, round, and reactive to light.   Neck: Normal range of motion. Neck supple. No JVD present. Carotid bruit is not present. No tracheal deviation present. No thyroid mass and no thyromegaly present.   Cardiovascular: Normal rate, regular rhythm and intact distal pulses. Exam reveals no gallop and no friction rub.   Murmur heard.  Pulmonary/Chest: Effort normal and breath sounds normal. No respiratory distress. She has no wheezes. She has no rales. She exhibits no tenderness.   Abdominal: Soft. Bowel sounds are normal. She  exhibits no distension, no abdominal bruit, no ascites and no mass. There is no hepatosplenomegaly. There is no tenderness. There is no rebound, no guarding and no CVA tenderness.   Musculoskeletal: She exhibits no edema or tenderness.   Lymphadenopathy:     She has no cervical adenopathy.   Neurological: She is alert and oriented to person, place, and time. She has normal reflexes. She displays normal reflexes. No cranial nerve deficit. She exhibits normal muscle tone. Coordination normal.   Skin: Skin is warm and intact. No rash noted. No erythema. No pallor.   Psychiatric: She has a normal mood and affect. Her behavior is normal. Judgment normal.             Labs:    Lab Results   Component Value Date    CREATININE 3.6 (H) 02/27/2019    BUN 64 (H) 02/27/2019     02/27/2019    K 4.7 02/27/2019     02/27/2019    CO2 26 02/27/2019       Lab Results   Component Value Date    WBC 4.81 02/27/2019    HGB 8.7 (L) 02/27/2019    HCT 26.6 (L) 02/27/2019    MCV 94 02/27/2019     02/27/2019       Lab Results   Component Value Date    .0 (H) 01/23/2018    CALCIUM 9.6 02/27/2019    PHOS 4.5 02/27/2019       Lab Results   Component Value Date    ALBUMIN 3.6 02/27/2019       Lab Results   Component Value Date    HGBA1C 6.7 (H) 11/27/2018         Impression and Plan :  74-year-old  woman seen in office today in follow-up for following medical problems       1. CKD stage 5 :  Chronic kidney disease due to longstanding history of hypertension and diabetes, progressive decline in renal function explained to the patient, recent serum creatinine is 3.6 mg/dL with a GFR of about 14 mL/minute, briefly talked about renal replacement therapy, will schedule patient to attend chronic kidney disease education and options class, will be an ideal candidate for peritoneal dialysis if interested, not sure if she is a candidate for kidney transplant due to cardiac disease and severe pulmonary  hypertension,      2.  Hypertension - BP controlled,  discussed low-salt diet,     3.  Congestive heart failure - currently on Demadex, discussed salt and fluid restriction,    Also pulmonary hypertension reported on the recent echocardiogram, fluid management can be a challenge in view of her pulmonary hypertension,     4. Anemia of CKD :   will schedule a follow-up with Hematology Department, likely will need iron replacement/Procrit injections,    5.  Secondary hyperparathyroidism - cont Reyaldee,      Return to clinic in about 4 weeks, more than 40 min of face-to-face time was spent with the patient discussing labs and plan of care.    Leonard Bryan MD

## 2019-02-27 NOTE — PATIENT INSTRUCTIONS
Avoid NSAID pain medications such as advil, aleve, motrin, ibuprofen, naprosyn, meloxicam, diclofenac, mobic.       Fluid restriction about 40 oz a day     Low salt diet

## 2019-03-04 ENCOUNTER — LAB VISIT (OUTPATIENT)
Dept: LAB | Facility: HOSPITAL | Age: 74
End: 2019-03-04
Attending: INTERNAL MEDICINE
Payer: MEDICARE

## 2019-03-04 ENCOUNTER — OFFICE VISIT (OUTPATIENT)
Dept: HEMATOLOGY/ONCOLOGY | Facility: CLINIC | Age: 74
End: 2019-03-04
Payer: MEDICARE

## 2019-03-04 VITALS
TEMPERATURE: 97 F | WEIGHT: 143.75 LBS | HEART RATE: 61 BPM | DIASTOLIC BLOOD PRESSURE: 69 MMHG | RESPIRATION RATE: 18 BRPM | SYSTOLIC BLOOD PRESSURE: 163 MMHG | BODY MASS INDEX: 23.95 KG/M2 | OXYGEN SATURATION: 99 % | HEIGHT: 65 IN

## 2019-03-04 DIAGNOSIS — Z79.899 OTHER LONG TERM (CURRENT) DRUG THERAPY: ICD-10-CM

## 2019-03-04 DIAGNOSIS — N18.4 ANEMIA IN STAGE 4 CHRONIC KIDNEY DISEASE: Primary | Chronic | ICD-10-CM

## 2019-03-04 DIAGNOSIS — D63.1 ANEMIA IN STAGE 4 CHRONIC KIDNEY DISEASE: ICD-10-CM

## 2019-03-04 DIAGNOSIS — D63.1 ANEMIA ASSOCIATED WITH CHRONIC RENAL FAILURE: ICD-10-CM

## 2019-03-04 DIAGNOSIS — D63.1 ANEMIA IN STAGE 4 CHRONIC KIDNEY DISEASE: Primary | Chronic | ICD-10-CM

## 2019-03-04 DIAGNOSIS — N18.4 CHRONIC KIDNEY DISEASE (CKD) STAGE G4/A3, SEVERELY DECREASED GLOMERULAR FILTRATION RATE (GFR) BETWEEN 15-29 ML/MIN/1.73 SQUARE METER AND ALBUMINURIA CREATININE RATIO GREATER THAN 300 MG/G: ICD-10-CM

## 2019-03-04 DIAGNOSIS — N18.9 ANEMIA ASSOCIATED WITH CHRONIC RENAL FAILURE: ICD-10-CM

## 2019-03-04 DIAGNOSIS — N18.4 ANEMIA IN STAGE 4 CHRONIC KIDNEY DISEASE: ICD-10-CM

## 2019-03-04 LAB
ALBUMIN SERPL BCP-MCNC: 3.7 G/DL
ALP SERPL-CCNC: 100 U/L
ALT SERPL W/O P-5'-P-CCNC: 18 U/L
ANION GAP SERPL CALC-SCNC: 7 MMOL/L
AST SERPL-CCNC: 18 U/L
BASOPHILS # BLD AUTO: 0 K/UL
BASOPHILS NFR BLD: 0 %
BILIRUB SERPL-MCNC: 0.2 MG/DL
BUN SERPL-MCNC: 61 MG/DL
CALCIUM SERPL-MCNC: 9.4 MG/DL
CHLORIDE SERPL-SCNC: 110 MMOL/L
CO2 SERPL-SCNC: 25 MMOL/L
CREAT SERPL-MCNC: 3.2 MG/DL
DIFFERENTIAL METHOD: ABNORMAL
EOSINOPHIL # BLD AUTO: 0.1 K/UL
EOSINOPHIL NFR BLD: 1 %
ERYTHROCYTE [DISTWIDTH] IN BLOOD BY AUTOMATED COUNT: 13.5 %
EST. GFR  (AFRICAN AMERICAN): 16 ML/MIN/1.73 M^2
EST. GFR  (NON AFRICAN AMERICAN): 14 ML/MIN/1.73 M^2
FERRITIN SERPL-MCNC: 296 NG/ML
GLUCOSE SERPL-MCNC: 172 MG/DL
HCT VFR BLD AUTO: 28.7 %
HGB BLD-MCNC: 9.3 G/DL
IRON SERPL-MCNC: 45 UG/DL
LDH SERPL L TO P-CCNC: 200 U/L
LYMPHOCYTES # BLD AUTO: 1 K/UL
LYMPHOCYTES NFR BLD: 20.2 %
MCH RBC QN AUTO: 30.4 PG
MCHC RBC AUTO-ENTMCNC: 32.4 G/DL
MCV RBC AUTO: 94 FL
MONOCYTES # BLD AUTO: 0.2 K/UL
MONOCYTES NFR BLD: 4.9 %
NEUTROPHILS # BLD AUTO: 3.6 K/UL
NEUTROPHILS NFR BLD: 73.9 %
PLATELET # BLD AUTO: 186 K/UL
PMV BLD AUTO: 9.5 FL
POTASSIUM SERPL-SCNC: 4.4 MMOL/L
PROT SERPL-MCNC: 6.7 G/DL
RBC # BLD AUTO: 3.06 M/UL
RETICS/RBC NFR AUTO: 1.9 %
SATURATED IRON: 16 %
SODIUM SERPL-SCNC: 142 MMOL/L
TOTAL IRON BINDING CAPACITY: 278 UG/DL
TRANSFERRIN SERPL-MCNC: 188 MG/DL
VIT B12 SERPL-MCNC: 496 PG/ML
WBC # BLD AUTO: 4.89 K/UL

## 2019-03-04 PROCEDURE — 85025 COMPLETE CBC W/AUTO DIFF WBC: CPT

## 2019-03-04 PROCEDURE — 3077F PR MOST RECENT SYSTOLIC BLOOD PRESSURE >= 140 MM HG: ICD-10-PCS | Mod: CPTII,S$GLB,, | Performed by: INTERNAL MEDICINE

## 2019-03-04 PROCEDURE — 36415 COLL VENOUS BLD VENIPUNCTURE: CPT

## 2019-03-04 PROCEDURE — 1101F PR PT FALLS ASSESS DOC 0-1 FALLS W/OUT INJ PAST YR: ICD-10-PCS | Mod: CPTII,S$GLB,, | Performed by: INTERNAL MEDICINE

## 2019-03-04 PROCEDURE — 1101F PT FALLS ASSESS-DOCD LE1/YR: CPT | Mod: CPTII,S$GLB,, | Performed by: INTERNAL MEDICINE

## 2019-03-04 PROCEDURE — 99999 PR PBB SHADOW E&M-EST. PATIENT-LVL IV: CPT | Mod: PBBFAC,,, | Performed by: INTERNAL MEDICINE

## 2019-03-04 PROCEDURE — 85045 AUTOMATED RETICULOCYTE COUNT: CPT

## 2019-03-04 PROCEDURE — 83540 ASSAY OF IRON: CPT

## 2019-03-04 PROCEDURE — 99499 UNLISTED E&M SERVICE: CPT | Mod: S$GLB,,, | Performed by: INTERNAL MEDICINE

## 2019-03-04 PROCEDURE — 82728 ASSAY OF FERRITIN: CPT

## 2019-03-04 PROCEDURE — 99499 RISK ADDL DX/OHS AUDIT: ICD-10-PCS | Mod: S$GLB,,, | Performed by: INTERNAL MEDICINE

## 2019-03-04 PROCEDURE — 3078F DIAST BP <80 MM HG: CPT | Mod: CPTII,S$GLB,, | Performed by: INTERNAL MEDICINE

## 2019-03-04 PROCEDURE — 80053 COMPREHEN METABOLIC PANEL: CPT

## 2019-03-04 PROCEDURE — 99214 PR OFFICE/OUTPT VISIT, EST, LEVL IV, 30-39 MIN: ICD-10-PCS | Mod: S$GLB,,, | Performed by: INTERNAL MEDICINE

## 2019-03-04 PROCEDURE — 3078F PR MOST RECENT DIASTOLIC BLOOD PRESSURE < 80 MM HG: ICD-10-PCS | Mod: CPTII,S$GLB,, | Performed by: INTERNAL MEDICINE

## 2019-03-04 PROCEDURE — 3077F SYST BP >= 140 MM HG: CPT | Mod: CPTII,S$GLB,, | Performed by: INTERNAL MEDICINE

## 2019-03-04 PROCEDURE — 82607 VITAMIN B-12: CPT

## 2019-03-04 PROCEDURE — 83615 LACTATE (LD) (LDH) ENZYME: CPT

## 2019-03-04 PROCEDURE — 99999 PR PBB SHADOW E&M-EST. PATIENT-LVL IV: ICD-10-PCS | Mod: PBBFAC,,, | Performed by: INTERNAL MEDICINE

## 2019-03-04 PROCEDURE — 99214 OFFICE O/P EST MOD 30 MIN: CPT | Mod: S$GLB,,, | Performed by: INTERNAL MEDICINE

## 2019-03-04 NOTE — LETTER
March 4, 2019      Leonard Bryan MD  [address]  [place] [address]           [facility]ematology Oncology  [id number] [place] [id number]  Phone: [phone]  Fax: [phone]          Patient: Avril Monzon   MR Number: [id number]   YOB: 1945   Date of Visit: 3/4/2019       Dear Dr. Leonard Bryan:    Thank you for referring Avril Monzon to me for evaluation. Attached you will find relevant portions of my assessment and plan of care.    If you have questions, please do not hesitate to call me. I look forward to following Avril Monzon along with you.    Sincerely,    Dudley Fletcher Jr., MD    Enclosure  CC:  No Recipients    If you would like to receive this communication electronically, please contact [email or web address] or [phone] to request more information on [employer] Link access.    For providers and/or their staff who would like to refer a patient to [facility], please contact us through our one-stop-shop provider referral line, [facility], at [phone].    If you feel you have received this communication in error or would no longer like to receive these types of communications, please e-mail [email or web address]

## 2019-03-04 NOTE — PROGRESS NOTES
Hematology/Oncology Office Note    Reason for referral:  Normocytic anemia    CC:  Anemia    Referred by:  Leonard Bryan MD    Diagnosis:  Normocytic anemia  CKD    Treatment:  Procrit 10,000 units biweekly      History of present illness:  71-year-old female with a history of diabetes mellitus, CKD, hypertension, and dyslipidemia who was been referred for progressive normocytic anemia.  Patient has significant CK D with baseline creatinine approximately 3.0.  Hemoglobin noted to be 9.3 on 6/14/16 and is trended down since 2012 when he was noted to be  11.3.  She reports fatigue, malaise, and cold intolerance, however, denies other significant symptoms.      I have reviewed and updated the HPI, ROS, PMHx, Social Hx, Family Hx and treatment history.      Today's visit:  Patient is without new complaints today.  She reports worsening renal function after heart attack and 11/2018.    Past Medical History:   Diagnosis Date    Acute hypoxemic respiratory failure 11/26/2018    Anemia     Arthritis     back, hand, knees    Back pain     Cataract     CKD stage G4/A3, GFR 15 - 29 and albumin creatinine ratio >300 mg/g     GFR 40% Jan 2014 and 33% ub 3/2014 ([facility])    Coronary artery disease involving native coronary artery of native heart 11/30/2018    Diabetic retinopathy     DM (diabetes mellitus) type II controlled, neurological manifestation     Exudative age-related macular degeneration of left eye with active choroidal neovascularization 2/5/2019    GERD (gastroesophageal reflux disease)     Glaucoma     Heart failure     Hyperlipidemia     Hypertension     NSTEMI (non-ST elevated myocardial infarction) 11/27/2018    Peripheral neuropathy     Polyneuropathy     Proteinuria     >6 mo     Seizures     [facility] 1/2014 -dick CT NRI showed small vessel    Stroke 2012,2014    Tobacco dependence     Type 2 diabetes with peripheral circulatory disorder, controlled          Social History:  No tobacco,  "alcohol, or illicit drugs    Family History: family history includes Cancer in her maternal grandmother; Diabetes in her mother; Heart disease in her mother; Hyperlipidemia in her mother; Hypertension in her mother; Muscular dystrophy in her son.      HPI    Review of Systems   Constitutional: Negative for activity change, appetite change, chills, fatigue, fever and unexpected weight change.   HENT: Negative for congestion, drooling, ear discharge, ear pain, facial swelling, mouth sores and nosebleeds.    Eyes: Negative.    Respiratory: Negative for apnea, cough, shortness of breath and wheezing.    Cardiovascular: Negative for chest pain, palpitations and leg swelling.   Gastrointestinal: Negative for abdominal distention, abdominal pain, anal bleeding, blood in stool, diarrhea and nausea.   Endocrine: Negative.    Genitourinary: Negative for decreased urine volume, difficulty urinating, enuresis, flank pain, genital sores and hematuria.   Musculoskeletal: Negative for arthralgias, back pain and neck pain.   Skin: Negative for color change, pallor and rash.   Allergic/Immunologic: Negative.    Neurological: Negative.  Negative for dizziness, tremors, syncope, facial asymmetry, numbness and headaches.   Hematological: Negative for adenopathy. Does not bruise/bleed easily.   Psychiatric/Behavioral: Negative for agitation, confusion, decreased concentration and dysphoric mood. The patient is not nervous/anxious and is not hyperactive.        Objective:       Vitals:    03/04/19 1038   BP: (!) 163/69   Pulse: 61   Resp: 18   Temp: 97.2 °F (36.2 °C)   TempSrc: Oral   SpO2: 99%   Weight: 65.2 kg (143 lb 11.8 oz)   Height: 5' 5" (1.651 m)     Physical Exam   Constitutional: She is oriented to person, place, and time. She appears well-developed and well-nourished. No distress.   Well groomed   HENT:   Head: Normocephalic and atraumatic.   Right Ear: External ear normal.   Left Ear: External ear normal.   Nose: Nose normal. " Right sinus exhibits no maxillary sinus tenderness and no frontal sinus tenderness. Left sinus exhibits no maxillary sinus tenderness and no frontal sinus tenderness.   Mouth/Throat: Oropharynx is clear and moist and mucous membranes are normal. Mucous membranes are not pale and not dry. No oral lesions. Normal dentition. No oropharyngeal exudate.   Eyes: Conjunctivae, EOM and lids are normal. Pupils are equal, round, and reactive to light. Lids are everted and swept, no foreign bodies found. Right eye exhibits no discharge.   Neck: Trachea normal and normal range of motion. Neck supple. No tracheal deviation present. No thyroid mass and no thyromegaly present.   No crepitus   Cardiovascular: Normal rate, regular rhythm, intact distal pulses and normal pulses. Exam reveals no gallop and no friction rub.   Murmur heard.  Pulmonary/Chest: Effort normal and breath sounds normal. No respiratory distress. She has no wheezes. She has no rales. She exhibits no tenderness.   Abdominal: Soft. Normal appearance and bowel sounds are normal. She exhibits no distension. There is no hepatosplenomegaly. There is no tenderness. There is no guarding.   Musculoskeletal: Normal range of motion. She exhibits no tenderness.   Lymphadenopathy:        Head (right side): No submental and no submandibular adenopathy present.        Head (left side): No submental and no submandibular adenopathy present.     She has no cervical adenopathy.     She has no axillary adenopathy.   Neurological: She is alert and oriented to person, place, and time. No cranial nerve deficit. Coordination normal.   Skin: Skin is warm, dry and intact. Capillary refill takes less than 2 seconds. No rash noted. She is not diaphoretic. No cyanosis. No pallor. Nails show no clubbing.   Psychiatric: She has a normal mood and affect. Her behavior is normal.       Lab Results   Component Value Date    WBC 4.81 02/27/2019    HGB 8.7 (L) 02/27/2019    HCT 26.6 (L) 02/27/2019     MCV 94 02/27/2019     02/27/2019     Lab Results   Component Value Date    CREATININE 3.6 (H) 02/27/2019    BUN 64 (H) 02/27/2019     02/27/2019    K 4.7 02/27/2019     02/27/2019    CO2 26 02/27/2019     Lab Results   Component Value Date    ALT 13 11/26/2018    AST 25 11/26/2018    ALKPHOS 84 11/26/2018    BILITOT 0.8 11/26/2018         Assessment:       71-year-old female with progressive normocytic anemia likely secondary to CKD.  Workup has also revealed a relative iron deficiency with a ferritin of 20 and 12% iron saturation.  Therefore, she received Feraheme 510 mg ×2 doses given 7/2016.  Procrit 10,000 units every 2 weeks (started 5/2017).  She has responded appropriately to Procrit.    Unfortunately she discontinued Procrit in 10/2017 due to social circumstances with an ill .   Her hemoglobin remains greater than 10 (10.5) while she has not had Procrit since 11/2017.   Iron deficiency has been treated with Injectafer 750 mg IV ×2 doses given in 3/2018.    Renal function and anemia has worsened over the previous 6 months.  We will check iron studies today and proceed with weekly Procrit if iron stores are adequate.  She will follow up in 1 month with repeat labs to evaluate response to Procrit.       iron deficiency:  -- status post Injectafer in 3/2018  -- refer to GI clinic to evaluate for endoscopies  --repeat CBC iron studies today and replete accordingly    Normocytic anemia: Secondary to CKD:  --Continue to monitor CBC and plan to initiate Procrit when hemoglobin falls below 9.5  --the patient has not had Procrit since 11/2017  --restart Procrit if iron studies are adequate

## 2019-03-05 RX ORDER — HEPARIN 100 UNIT/ML
500 SYRINGE INTRAVENOUS
Status: CANCELLED | OUTPATIENT
Start: 2019-03-23

## 2019-03-05 RX ORDER — HEPARIN 100 UNIT/ML
500 SYRINGE INTRAVENOUS
Status: CANCELLED | OUTPATIENT
Start: 2019-03-05

## 2019-03-05 RX ORDER — SODIUM CHLORIDE 0.9 % (FLUSH) 0.9 %
10 SYRINGE (ML) INJECTION
Status: CANCELLED | OUTPATIENT
Start: 2019-03-23

## 2019-03-05 RX ORDER — SODIUM CHLORIDE 0.9 % (FLUSH) 0.9 %
10 SYRINGE (ML) INJECTION
Status: CANCELLED | OUTPATIENT
Start: 2019-03-05

## 2019-03-12 DIAGNOSIS — N25.81 SECONDARY HYPERPARATHYROIDISM: ICD-10-CM

## 2019-03-15 ENCOUNTER — LAB VISIT (OUTPATIENT)
Dept: LAB | Facility: HOSPITAL | Age: 74
End: 2019-03-15
Attending: INTERNAL MEDICINE
Payer: MEDICARE

## 2019-03-15 ENCOUNTER — OFFICE VISIT (OUTPATIENT)
Dept: INTERNAL MEDICINE | Facility: CLINIC | Age: 74
End: 2019-03-15
Payer: MEDICARE

## 2019-03-15 ENCOUNTER — PROCEDURE VISIT (OUTPATIENT)
Dept: OPHTHALMOLOGY | Facility: CLINIC | Age: 74
End: 2019-03-15
Payer: MEDICARE

## 2019-03-15 VITALS
OXYGEN SATURATION: 99 % | TEMPERATURE: 98 F | HEIGHT: 65 IN | DIASTOLIC BLOOD PRESSURE: 80 MMHG | BODY MASS INDEX: 24.5 KG/M2 | SYSTOLIC BLOOD PRESSURE: 166 MMHG | HEART RATE: 69 BPM | WEIGHT: 147.06 LBS

## 2019-03-15 DIAGNOSIS — E21.3 HYPERPARATHYROIDISM: ICD-10-CM

## 2019-03-15 DIAGNOSIS — E11.22 DIABETES MELLITUS WITH STAGE 4 CHRONIC KIDNEY DISEASE GFR 15-29: ICD-10-CM

## 2019-03-15 DIAGNOSIS — I25.111 CORONARY ARTERY DISEASE INVOLVING NATIVE CORONARY ARTERY OF NATIVE HEART WITH ANGINA PECTORIS WITH DOCUMENTED SPASM: ICD-10-CM

## 2019-03-15 DIAGNOSIS — E11.59 HYPERTENSION ASSOCIATED WITH DIABETES: Chronic | ICD-10-CM

## 2019-03-15 DIAGNOSIS — E55.9 VITAMIN D DEFICIENCY: ICD-10-CM

## 2019-03-15 DIAGNOSIS — Z12.11 SCREEN FOR COLON CANCER: ICD-10-CM

## 2019-03-15 DIAGNOSIS — E11.42 CONTROLLED TYPE 2 DIABETES MELLITUS WITH DIABETIC POLYNEUROPATHY, WITH LONG-TERM CURRENT USE OF INSULIN: Primary | Chronic | ICD-10-CM

## 2019-03-15 DIAGNOSIS — E11.69 HYPERLIPIDEMIA ASSOCIATED WITH TYPE 2 DIABETES MELLITUS: ICD-10-CM

## 2019-03-15 DIAGNOSIS — Z29.9 PREVENTIVE MEASURE: ICD-10-CM

## 2019-03-15 DIAGNOSIS — H35.3221 EXUDATIVE AGE-RELATED MACULAR DEGENERATION OF LEFT EYE WITH ACTIVE CHOROIDAL NEOVASCULARIZATION: Primary | ICD-10-CM

## 2019-03-15 DIAGNOSIS — D50.0 IRON DEFICIENCY ANEMIA DUE TO CHRONIC BLOOD LOSS: ICD-10-CM

## 2019-03-15 DIAGNOSIS — E78.5 HYPERLIPIDEMIA ASSOCIATED WITH TYPE 2 DIABETES MELLITUS: ICD-10-CM

## 2019-03-15 DIAGNOSIS — Z79.4 CONTROLLED TYPE 2 DIABETES MELLITUS WITH DIABETIC POLYNEUROPATHY, WITH LONG-TERM CURRENT USE OF INSULIN: Primary | Chronic | ICD-10-CM

## 2019-03-15 DIAGNOSIS — N18.5 CHRONIC KIDNEY DISEASE (CKD), STAGE V: ICD-10-CM

## 2019-03-15 DIAGNOSIS — I15.2 HYPERTENSION ASSOCIATED WITH DIABETES: Chronic | ICD-10-CM

## 2019-03-15 DIAGNOSIS — N18.4 DIABETES MELLITUS WITH STAGE 4 CHRONIC KIDNEY DISEASE GFR 15-29: ICD-10-CM

## 2019-03-15 DIAGNOSIS — I70.0 AORTIC ATHEROSCLEROSIS: ICD-10-CM

## 2019-03-15 LAB
25(OH)D3+25(OH)D2 SERPL-MCNC: 26 NG/ML
ALBUMIN SERPL BCP-MCNC: 3.8 G/DL
ANION GAP SERPL CALC-SCNC: 7 MMOL/L
BASOPHILS # BLD AUTO: 0.01 K/UL
BASOPHILS NFR BLD: 0.2 %
BUN SERPL-MCNC: 62 MG/DL
CALCIUM SERPL-MCNC: 9.4 MG/DL
CHLORIDE SERPL-SCNC: 106 MMOL/L
CO2 SERPL-SCNC: 28 MMOL/L
CREAT SERPL-MCNC: 3.1 MG/DL
DIFFERENTIAL METHOD: ABNORMAL
EOSINOPHIL # BLD AUTO: 0.1 K/UL
EOSINOPHIL NFR BLD: 1.3 %
ERYTHROCYTE [DISTWIDTH] IN BLOOD BY AUTOMATED COUNT: 12.8 %
EST. GFR  (AFRICAN AMERICAN): 16.6 ML/MIN/1.73 M^2
EST. GFR  (NON AFRICAN AMERICAN): 14.4 ML/MIN/1.73 M^2
GLUCOSE SERPL-MCNC: 168 MG/DL
HCT VFR BLD AUTO: 28.4 %
HGB BLD-MCNC: 8.9 G/DL
IMM GRANULOCYTES # BLD AUTO: 0.01 K/UL
IMM GRANULOCYTES NFR BLD AUTO: 0.2 %
LYMPHOCYTES # BLD AUTO: 0.9 K/UL
LYMPHOCYTES NFR BLD: 21 %
MCH RBC QN AUTO: 30.4 PG
MCHC RBC AUTO-ENTMCNC: 31.3 G/DL
MCV RBC AUTO: 97 FL
MONOCYTES # BLD AUTO: 0.4 K/UL
MONOCYTES NFR BLD: 9.8 %
NEUTROPHILS # BLD AUTO: 3 K/UL
NEUTROPHILS NFR BLD: 67.5 %
NRBC BLD-RTO: 0 /100 WBC
PHOSPHATE SERPL-MCNC: 4.2 MG/DL
PLATELET # BLD AUTO: 201 K/UL
PMV BLD AUTO: 10.5 FL
POTASSIUM SERPL-SCNC: 4.1 MMOL/L
PTH-INTACT SERPL-MCNC: 410 PG/ML
RBC # BLD AUTO: 2.93 M/UL
SODIUM SERPL-SCNC: 141 MMOL/L
WBC # BLD AUTO: 4.48 K/UL

## 2019-03-15 PROCEDURE — 36415 COLL VENOUS BLD VENIPUNCTURE: CPT

## 2019-03-15 PROCEDURE — 85025 COMPLETE CBC W/AUTO DIFF WBC: CPT

## 2019-03-15 PROCEDURE — 3044F PR MOST RECENT HEMOGLOBIN A1C LEVEL <7.0%: ICD-10-PCS | Mod: CPTII,S$GLB,, | Performed by: INTERNAL MEDICINE

## 2019-03-15 PROCEDURE — 3044F HG A1C LEVEL LT 7.0%: CPT | Mod: CPTII,S$GLB,, | Performed by: INTERNAL MEDICINE

## 2019-03-15 PROCEDURE — 92134 POSTERIOR SEGMENT OCT RETINA (OCULAR COHERENCE TOMOGRAPHY)-BOTH EYES: ICD-10-PCS | Mod: S$GLB,,, | Performed by: OPHTHALMOLOGY

## 2019-03-15 PROCEDURE — 99214 PR OFFICE/OUTPT VISIT, EST, LEVL IV, 30-39 MIN: ICD-10-PCS | Mod: S$GLB,,, | Performed by: INTERNAL MEDICINE

## 2019-03-15 PROCEDURE — 99214 OFFICE O/P EST MOD 30 MIN: CPT | Mod: S$GLB,,, | Performed by: INTERNAL MEDICINE

## 2019-03-15 PROCEDURE — 92134 CPTRZ OPH DX IMG PST SGM RTA: CPT | Mod: S$GLB,,, | Performed by: OPHTHALMOLOGY

## 2019-03-15 PROCEDURE — 3079F DIAST BP 80-89 MM HG: CPT | Mod: CPTII,S$GLB,, | Performed by: INTERNAL MEDICINE

## 2019-03-15 PROCEDURE — 67028 INJECTION EYE DRUG: CPT | Mod: LT,S$GLB,, | Performed by: OPHTHALMOLOGY

## 2019-03-15 PROCEDURE — 99499 RISK ADDL DX/OHS AUDIT: ICD-10-PCS | Mod: S$GLB,,, | Performed by: INTERNAL MEDICINE

## 2019-03-15 PROCEDURE — 1101F PT FALLS ASSESS-DOCD LE1/YR: CPT | Mod: CPTII,S$GLB,, | Performed by: INTERNAL MEDICINE

## 2019-03-15 PROCEDURE — 99499 UNLISTED E&M SERVICE: CPT | Mod: S$GLB,,, | Performed by: INTERNAL MEDICINE

## 2019-03-15 PROCEDURE — 80069 RENAL FUNCTION PANEL: CPT

## 2019-03-15 PROCEDURE — 99999 PR PBB SHADOW E&M-EST. PATIENT-LVL III: CPT | Mod: PBBFAC,,, | Performed by: INTERNAL MEDICINE

## 2019-03-15 PROCEDURE — 83970 ASSAY OF PARATHORMONE: CPT

## 2019-03-15 PROCEDURE — 82306 VITAMIN D 25 HYDROXY: CPT

## 2019-03-15 PROCEDURE — 3077F PR MOST RECENT SYSTOLIC BLOOD PRESSURE >= 140 MM HG: ICD-10-PCS | Mod: CPTII,S$GLB,, | Performed by: INTERNAL MEDICINE

## 2019-03-15 PROCEDURE — 99499 NO LOS: ICD-10-PCS | Mod: S$GLB,,, | Performed by: OPHTHALMOLOGY

## 2019-03-15 PROCEDURE — 99499 UNLISTED E&M SERVICE: CPT | Mod: S$GLB,,, | Performed by: OPHTHALMOLOGY

## 2019-03-15 PROCEDURE — 1101F PR PT FALLS ASSESS DOC 0-1 FALLS W/OUT INJ PAST YR: ICD-10-PCS | Mod: CPTII,S$GLB,, | Performed by: INTERNAL MEDICINE

## 2019-03-15 PROCEDURE — 3077F SYST BP >= 140 MM HG: CPT | Mod: CPTII,S$GLB,, | Performed by: INTERNAL MEDICINE

## 2019-03-15 PROCEDURE — 3079F PR MOST RECENT DIASTOLIC BLOOD PRESSURE 80-89 MM HG: ICD-10-PCS | Mod: CPTII,S$GLB,, | Performed by: INTERNAL MEDICINE

## 2019-03-15 PROCEDURE — 99999 PR PBB SHADOW E&M-EST. PATIENT-LVL III: ICD-10-PCS | Mod: PBBFAC,,, | Performed by: INTERNAL MEDICINE

## 2019-03-15 PROCEDURE — 67028 PR INJECT INTRAVITREAL PHARMCOLOGIC: ICD-10-PCS | Mod: LT,S$GLB,, | Performed by: OPHTHALMOLOGY

## 2019-03-15 RX ORDER — POLYMYXIN B SULFATE AND TRIMETHOPRIM 1; 10000 MG/ML; [USP'U]/ML
1 SOLUTION OPHTHALMIC 4 TIMES DAILY
Qty: 10 ML | Refills: 0 | Status: SHIPPED | OUTPATIENT
Start: 2019-03-15 | End: 2019-04-14

## 2019-03-15 RX ORDER — SODIUM, POTASSIUM,MAG SULFATES 17.5-3.13G
1 SOLUTION, RECONSTITUTED, ORAL ORAL
Qty: 1 BOTTLE | Refills: 0 | Status: SHIPPED | OUTPATIENT
Start: 2019-03-15 | End: 2019-03-15

## 2019-03-15 RX ADMIN — Medication 1.25 MG: at 03:03

## 2019-03-15 NOTE — PROGRESS NOTES
===============================  03/15/2019   Avril Monzon,   74 y.o. female   Last visit [facility]: :2/5/2019   Last visit eye dept. 2/5/2019  [facility]:  Corrected distance visual acuity was NLP in the right eye and 20/100 in the left eye.   Not recorded         Not recorded         Not recorded        Chief Complaint   Patient presents with    SRN     pt here for avastin left eye. pt states her vision has gotten better. will need a prescription sent downstairs for her drops, misplaced them after her last visit.         HPI     SRN      Additional comments: pt here for avastin left eye. pt states her vision   has gotten better. will need a prescription sent downstairs for her drops,   misplaced them after her last visit.               Comments     Noncompliant with visits    1.) DM SINCE 1995  PDR  S/P LASER AND INJECTIONS OS(DR BOX AND AT [facility])  2.) NLP OD SINCE BIRTH  3.) DENSE CAT OD / 2+NS OS  4.) Asteroid Hyalosis  5.)iop 42 od 11/5/18  EYLEA OS 1/4/17  AVASTIN OS 2/5/19          Last edited by Ronaldo Ibarra on 3/15/2019  3:04 PM. (History)          ________________  3/15/2019  Problem List Items Addressed This Visit        Eye/Vision problems    Exudative age-related macular degeneration of left eye with active choroidal neovascularization - Primary    Relevant Medications    bevacizumab (AVASTIN) 2.5 mg/0.10 mL injection 1.25 mg    Other Relevant Orders    Posterior Segment OCT Retina-Both eyes    Prior Authorization Order        moncular srn   much better 20/400 20 1//  rtc 1 mo  Do     3/15/2019  Diagnosis :  Os srn  Today:   Avastin (Bevacizumab) 1.25 mg/0.05 ml Intravitreal Injection , OS   Follow up: rtc 1 mo do        Instructed to call 24/7 for any worsening of vision. Check Both eyes daily. Gave patient my home phone number.      Procedure  Note:   OS}  Avastin (Bevacizumab) 1.25 mg/0.05 ml Intravitreal Injection    I have explained the Risks, Benefits and Alternatives of the procedure in detail.   The patient voices understanding and all questions have been answered.  The patient agrees to proceed as discussed.  Xylocaine with Epi 2%  Sub conj bleb  was used for anesthesia.  Topical betaine was used for antisepsis.  0.05 cc was  injected 3.7 mm from corneal limbus in the inferotemporal quadrant.  Following injection the IOP was less than thirty (<30) by tonopen.  The eye was then thoroughly irrigated with BSS.  Patient tolerated procedure well.  No complications were observed.  The Patient was educated that mild irritation tonight was normal secondary to topical antispsis use.  Pt was advised to call at any time day or night for pain, redness, or any decline in vision. I gave the patient my home number as well as the clinic on call number. Daily visual checks and Amsler grid testing were reviewed.  polytrim Antibiotic Drops to be used 4 times daily for 4 days  JADIEL Coello MD  Procedure ordered: y  Consent: y  Pre auth: y  MAR:y  Opnote: y  Charge capture:y      .       ===========================

## 2019-03-15 NOTE — PROGRESS NOTES
"Subjective:      Patient ID: Avril Monzon is a 74 y.o. female.    Chief Complaint: Follow-up      HPI  Here for follow up of medical problems.  Monitoring BPs for upcoming Cards appt, says goes up and down.  AC breakfast sugar .  No low sugars.  No f/c/sw/cough.  No cp/sob/palp.  BMs ok with occasional dulcolax.  On IV iron.    Updated/annual due 6/19:  HM: 11/18 fluvax, 1/16 gcaevx32, 5/14 ulfscx98, 6/12 TDaP, 12/18 mmg, Cscope about 6y ago outside, 3/19 Eye Dr. Coello.      Review of Systems   Constitutional: Negative for appetite change, chills, diaphoresis and fever.   HENT: Negative for congestion, ear pain, rhinorrhea, sinus pressure and sore throat.    Respiratory: Negative for cough, chest tightness and shortness of breath.    Cardiovascular: Negative for chest pain and palpitations.   Gastrointestinal: Negative for blood in stool, constipation, diarrhea, nausea and vomiting.   Genitourinary: Negative for dysuria, frequency, hematuria, menstrual problem, urgency and vaginal discharge.   Musculoskeletal: Negative for arthralgias.   Skin: Negative for rash.   Neurological: Negative for dizziness and headaches.   Psychiatric/Behavioral: Negative for sleep disturbance. The patient is not nervous/anxious.          Objective:   BP (!) 166/80 (BP Location: Right arm, Patient Position: Sitting, BP Method: Medium (Manual))   Pulse 69   Temp 97.5 °F (36.4 °C) (Tympanic)   Ht 5' 5" (1.651 m)   Wt 66.7 kg (147 lb 0.8 oz)   SpO2 99%   BMI 24.47 kg/m²     Physical Exam   Constitutional: She is oriented to person, place, and time. She appears well-developed.   HENT:   Mouth/Throat: Oropharynx is clear and moist.   Neck: Neck supple. Carotid bruit is not present. No thyroid mass present.   Cardiovascular: Normal rate, regular rhythm and intact distal pulses. Exam reveals no gallop and no friction rub.   Murmur (systolic) heard.  Pulmonary/Chest: Effort normal and breath sounds normal. She has no wheezes. She " has no rales.   Abdominal: Soft. Bowel sounds are normal. She exhibits no mass. There is no hepatosplenomegaly. There is no tenderness.   Musculoskeletal: She exhibits no edema.   Lymphadenopathy:     She has no cervical adenopathy.   Neurological: She is alert and oriented to person, place, and time.   Psychiatric: She has a normal mood and affect.     Results for orders placed or performed in visit on 03/04/19   CBC auto differential   Result Value Ref Range    WBC 4.89 3.90 - 12.70 K/uL    RBC 3.06 (L) 4.00 - 5.40 M/uL    Hemoglobin 9.3 (L) 12.0 - 16.0 g/dL    Hematocrit 28.7 (L) 37.0 - 48.5 %    MCV 94 82 - 98 fL    MCH 30.4 27.0 - 31.0 pg    MCHC 32.4 32.0 - 36.0 g/dL    RDW 13.5 11.5 - 14.5 %    Platelets 186 150 - 350 K/uL    MPV 9.5 9.2 - 12.9 fL    Gran # (ANC) 3.6 1.8 - 7.7 K/uL    Lymph # 1.0 1.0 - 4.8 K/uL    Mono # 0.2 (L) 0.3 - 1.0 K/uL    Eos # 0.1 0.0 - 0.5 K/uL    Baso # 0.00 0.00 - 0.20 K/uL    Gran% 73.9 (H) 38.0 - 73.0 %    Lymph% 20.2 18.0 - 48.0 %    Mono% 4.9 4.0 - 15.0 %    Eosinophil% 1.0 0.0 - 8.0 %    Basophil% 0.0 0.0 - 1.9 %    Differential Method Automated    Comprehensive metabolic panel   Result Value Ref Range    Sodium 142 136 - 145 mmol/L    Potassium 4.4 3.5 - 5.1 mmol/L    Chloride 110 95 - 110 mmol/L    CO2 25 23 - 29 mmol/L    Glucose 172 (H) 70 - 110 mg/dL    BUN, Bld 61 (H) 8 - 23 mg/dL    Creatinine 3.2 (H) 0.5 - 1.4 mg/dL    Calcium 9.4 8.7 - 10.5 mg/dL    Total Protein 6.7 6.0 - 8.4 g/dL    Albumin 3.7 3.5 - 5.2 g/dL    Total Bilirubin 0.2 0.1 - 1.0 mg/dL    Alkaline Phosphatase 100 55 - 135 U/L    AST 18 10 - 40 U/L    ALT 18 10 - 44 U/L    Anion Gap 7 (L) 8 - 16 mmol/L    eGFR if African American 16 (A) >60 mL/min/1.73 m^2    eGFR if non African American 14 (A) >60 mL/min/1.73 m^2   Ferritin   Result Value Ref Range    Ferritin 296 20.0 - 300.0 ng/mL   Iron and TIBC   Result Value Ref Range    Iron 45 30 - 160 ug/dL    Transferrin 188 (L) 200 - 375 mg/dL    TIBC 278 250  - 450 ug/dL    Saturated Iron 16 (L) 20 - 50 %   Lactate dehydrogenase   Result Value Ref Range     110 - 260 U/L   Reticulocytes   Result Value Ref Range    Retic 1.9 0.5 - 2.5 %   Vitamin B12   Result Value Ref Range    Vitamin B-12 496 210 - 950 pg/mL       Results for SERVANDO BUSTILLO (MRN [id number]) as of 3/15/2019 12:07   Ref. Range 11/27/2018 05:46   Hemoglobin A1C External Latest Ref Range: 4.0 - 5.6 % 6.7 (H)   Estimated Avg Glucose Latest Ref Range: 68 - 131 mg/dL 146 (H)   Results for SERVANDO BUSTILLO (MRN [id number]) as of 3/15/2019 12:07   Ref. Range 11/27/2018 05:46   Triglycerides Latest Ref Range: 30 - 150 mg/dL 87   Cholesterol Latest Ref Range: 120 - 199 mg/dL 173   HDL Latest Ref Range: 40 - 75 mg/dL 58   HDL/Chol Ratio Latest Ref Range: 20.0 - 50.0 % 33.5   LDL Cholesterol Latest Ref Range: 63.0 - 159.0 mg/dL 97.6   Non-HDL Cholesterol Latest Units: mg/dL 115   Total Cholesterol/HDL Ratio Latest Ref Range: 2.0 - 5.0  3.0       Assessment:       1. Controlled type 2 diabetes mellitus with diabetic polyneuropathy, with long-term current use of insulin    2. Coronary artery disease involving native coronary artery of native heart with angina pectoris with documented spasm    3. Diabetes mellitus with stage 4 chronic kidney disease GFR 15-29    4. Hyperlipidemia associated with type 2 diabetes mellitus    5. Hypertension associated with diabetes    6. Iron deficiency anemia due to chronic blood loss    7. Hyperparathyroidism    8. Aortic atherosclerosis    9. Vitamin D deficiency    10. Preventive measure    11. Screen for colon cancer          Plan:     Controlled type 2 diabetes mellitus with diabetic polyneuropathy, with long-term current use of insulin- doing well, cont current 70/30.    Coronary artery disease involving native coronary artery of native heart with angina pectoris with documented spasm- f/w Cards.    Diabetes mellitus with stage 4 chronic kidney disease GFR 15-29,  Hyperparathyroidism, Vitamin D deficiency- per Nephro.    Hyperlipidemia associated with type 2 diabetes mellitus- doing well on high dose statin.    Hypertension associated with diabetes- continue to monitor for Cardiology appt.    Iron deficiency anemia due to chronic blood loss- per HemeOnc.    Aortic atherosclerosis- on statin and ASA.    Preventive measure- due for annual in June.    Screen for colon cancer  -     Case request GI: COLONOSCOPY  -     Discontinue: sodium,potassium,mag sulfates (SUPREP BOWEL PREP KIT) 17.5-3.13-1.6 gram SolR; Take 354 mLs by mouth daily 2 hours after breakfast.  Dispense: 1 Bottle; Refill: 0

## 2019-03-20 ENCOUNTER — TELEPHONE (OUTPATIENT)
Dept: ENDOSCOPY | Facility: HOSPITAL | Age: 74
End: 2019-03-20

## 2019-03-20 NOTE — TELEPHONE ENCOUNTER
Spoke with patient in efforts to schedule endoscopy procedure. Patient has had stents placed in November of 2018. Advised patient that she would need to be seen by GI prior to procedure, successfully transferred to scheduling.

## 2019-03-22 ENCOUNTER — INFUSION (OUTPATIENT)
Dept: INFUSION THERAPY | Facility: HOSPITAL | Age: 74
End: 2019-03-22
Attending: INTERNAL MEDICINE
Payer: MEDICARE

## 2019-03-22 VITALS
RESPIRATION RATE: 16 BRPM | DIASTOLIC BLOOD PRESSURE: 70 MMHG | TEMPERATURE: 98 F | HEART RATE: 54 BPM | OXYGEN SATURATION: 99 % | SYSTOLIC BLOOD PRESSURE: 174 MMHG

## 2019-03-22 DIAGNOSIS — N18.4 ANEMIA IN STAGE 4 CHRONIC KIDNEY DISEASE: ICD-10-CM

## 2019-03-22 DIAGNOSIS — D63.1 ANEMIA IN STAGE 4 CHRONIC KIDNEY DISEASE: ICD-10-CM

## 2019-03-22 DIAGNOSIS — D50.0 IRON DEFICIENCY ANEMIA DUE TO CHRONIC BLOOD LOSS: Primary | ICD-10-CM

## 2019-03-22 DIAGNOSIS — N18.4 CHRONIC KIDNEY DISEASE (CKD) STAGE G4/A3, SEVERELY DECREASED GLOMERULAR FILTRATION RATE (GFR) BETWEEN 15-29 ML/MIN/1.73 SQUARE METER AND ALBUMINURIA CREATININE RATIO GREATER THAN 300 MG/G: ICD-10-CM

## 2019-03-22 PROCEDURE — 96365 THER/PROPH/DIAG IV INF INIT: CPT

## 2019-03-22 PROCEDURE — 63600175 PHARM REV CODE 636 W HCPCS: Mod: JG | Performed by: INTERNAL MEDICINE

## 2019-03-22 PROCEDURE — 25000003 PHARM REV CODE 250: Performed by: INTERNAL MEDICINE

## 2019-03-22 RX ORDER — CLONIDINE HYDROCHLORIDE 0.1 MG/1
0.2 TABLET ORAL
Status: CANCELLED
Start: 2019-03-22 | End: 2019-03-22

## 2019-03-22 RX ORDER — CLONIDINE HYDROCHLORIDE 0.1 MG/1
0.2 TABLET ORAL
Status: COMPLETED | OUTPATIENT
Start: 2019-03-22 | End: 2019-03-22

## 2019-03-22 RX ADMIN — CLONIDINE HYDROCHLORIDE 0.2 MG: 0.1 TABLET ORAL at 01:03

## 2019-03-22 RX ADMIN — FERRIC CARBOXYMALTOSE INJECTION 750 MG: 50 INJECTION, SOLUTION INTRAVENOUS at 02:03

## 2019-03-22 NOTE — NURSING
1401: Reported pt's BP of 180/71 after clonidine to Dr. Fletcher. MD ok with proceeding with iron infusion.

## 2019-03-22 NOTE — PLAN OF CARE
Problem: Adult Inpatient Plan of Care  Goal: Plan of Care Review  Outcome: Ongoing (interventions implemented as appropriate)  I feel ok, I'm not sure why my blood pressure is so high.

## 2019-03-22 NOTE — PLAN OF CARE
Problem: Anemia  Goal: Anemia Symptom Improvement  Outcome: Ongoing (interventions implemented as appropriate)  Pt reports no signs or symptoms

## 2019-03-22 NOTE — NURSING
1300: Pt's /73. Reported to Dr. Fletcher. MD put in orders 0.2mg clonidine PO once. Will continue to monitor pt's BP before administering iron.

## 2019-03-22 NOTE — DISCHARGE INSTRUCTIONS
[facility]  [address] [address]  [phone] phone     [phone] fax  Hours of Operation: Monday- Friday 8:00am- 5:00pm  After hours phone  [phone]  Hematology / Oncology Physicians on call      Dr. Doug Bowman    Please call with any concerns regarding your appointment today.

## 2019-03-26 ENCOUNTER — HOSPITAL ENCOUNTER (OUTPATIENT)
Facility: HOSPITAL | Age: 74
Discharge: HOME | End: 2019-03-27
Attending: EMERGENCY MEDICINE | Admitting: FAMILY MEDICINE
Payer: MEDICARE

## 2019-03-26 ENCOUNTER — TELEPHONE (OUTPATIENT)
Dept: CARDIOLOGY | Facility: CLINIC | Age: 74
End: 2019-03-26

## 2019-03-26 DIAGNOSIS — N18.4 CKD (CHRONIC KIDNEY DISEASE), STAGE IV: ICD-10-CM

## 2019-03-26 DIAGNOSIS — I16.0 HYPERTENSIVE URGENCY: Primary | ICD-10-CM

## 2019-03-26 DIAGNOSIS — I10 HYPERTENSION: ICD-10-CM

## 2019-03-26 DIAGNOSIS — R79.89 ELEVATED TROPONIN: ICD-10-CM

## 2019-03-26 PROBLEM — I50.22 CHRONIC SYSTOLIC CONGESTIVE HEART FAILURE: Status: ACTIVE | Noted: 2018-12-12

## 2019-03-26 PROBLEM — G44.89 OTHER HEADACHE SYNDROME: Status: ACTIVE | Noted: 2019-03-26

## 2019-03-26 LAB
ALBUMIN SERPL BCP-MCNC: 3.8 G/DL (ref 3.5–5.2)
ALP SERPL-CCNC: 92 U/L (ref 55–135)
ALT SERPL W/O P-5'-P-CCNC: 28 U/L (ref 10–44)
ANION GAP SERPL CALC-SCNC: 12 MMOL/L (ref 8–16)
AST SERPL-CCNC: 18 U/L (ref 10–40)
BACTERIA #/AREA URNS HPF: NORMAL /HPF
BASOPHILS # BLD AUTO: 0.01 K/UL (ref 0–0.2)
BASOPHILS NFR BLD: 0.1 % (ref 0–1.9)
BILIRUB SERPL-MCNC: 0.3 MG/DL (ref 0.1–1)
BILIRUB UR QL STRIP: NEGATIVE
BNP SERPL-MCNC: 415 PG/ML (ref 0–99)
BUN SERPL-MCNC: 61 MG/DL (ref 8–23)
CALCIUM SERPL-MCNC: 9.3 MG/DL (ref 8.7–10.5)
CHLORIDE SERPL-SCNC: 107 MMOL/L (ref 95–110)
CK SERPL-CCNC: 185 U/L (ref 20–180)
CLARITY UR: CLEAR
CO2 SERPL-SCNC: 25 MMOL/L (ref 23–29)
COLOR UR: YELLOW
CREAT SERPL-MCNC: 3.3 MG/DL (ref 0.5–1.4)
DIFFERENTIAL METHOD: ABNORMAL
EOSINOPHIL # BLD AUTO: 0.1 K/UL (ref 0–0.5)
EOSINOPHIL NFR BLD: 0.9 % (ref 0–8)
ERYTHROCYTE [DISTWIDTH] IN BLOOD BY AUTOMATED COUNT: 13.2 % (ref 11.5–14.5)
EST. GFR  (AFRICAN AMERICAN): 15 ML/MIN/1.73 M^2
EST. GFR  (NON AFRICAN AMERICAN): 13 ML/MIN/1.73 M^2
GLUCOSE SERPL-MCNC: 59 MG/DL (ref 70–110)
GLUCOSE UR QL STRIP: NEGATIVE
HCT VFR BLD AUTO: 29.6 % (ref 37–48.5)
HGB BLD-MCNC: 9.6 G/DL (ref 12–16)
HGB UR QL STRIP: NEGATIVE
HYALINE CASTS #/AREA URNS LPF: 0 /LPF
KETONES UR QL STRIP: NEGATIVE
LEUKOCYTE ESTERASE UR QL STRIP: NEGATIVE
LYMPHOCYTES # BLD AUTO: 1.1 K/UL (ref 1–4.8)
LYMPHOCYTES NFR BLD: 15.9 % (ref 18–48)
MAGNESIUM SERPL-MCNC: 1.8 MG/DL (ref 1.6–2.6)
MCH RBC QN AUTO: 30.3 PG (ref 27–31)
MCHC RBC AUTO-ENTMCNC: 32.4 G/DL (ref 32–36)
MCV RBC AUTO: 93 FL (ref 82–98)
MICROSCOPIC COMMENT: NORMAL
MONOCYTES # BLD AUTO: 0.4 K/UL (ref 0.3–1)
MONOCYTES NFR BLD: 5.7 % (ref 4–15)
NEUTROPHILS # BLD AUTO: 5.3 K/UL (ref 1.8–7.7)
NEUTROPHILS NFR BLD: 77.4 % (ref 38–73)
NITRITE UR QL STRIP: NEGATIVE
PH UR STRIP: 7 [PH] (ref 5–8)
PHOSPHATE SERPL-MCNC: 3.5 MG/DL (ref 2.7–4.5)
PLATELET # BLD AUTO: 186 K/UL (ref 150–350)
PMV BLD AUTO: 9.4 FL (ref 9.2–12.9)
POCT GLUCOSE: 149 MG/DL (ref 70–110)
POTASSIUM SERPL-SCNC: 3.7 MMOL/L (ref 3.5–5.1)
PROT SERPL-MCNC: 6.6 G/DL (ref 6–8.4)
PROT UR QL STRIP: ABNORMAL
RBC # BLD AUTO: 3.17 M/UL (ref 4–5.4)
RBC #/AREA URNS HPF: 3 /HPF (ref 0–4)
SODIUM SERPL-SCNC: 144 MMOL/L (ref 136–145)
SP GR UR STRIP: 1.01 (ref 1–1.03)
SQUAMOUS #/AREA URNS HPF: 0 /HPF
TROPONIN I SERPL DL<=0.01 NG/ML-MCNC: 0.08 NG/ML (ref 0–0.03)
URN SPEC COLLECT METH UR: ABNORMAL
UROBILINOGEN UR STRIP-ACNC: NEGATIVE EU/DL
WBC # BLD AUTO: 6.87 K/UL (ref 3.9–12.7)
WBC #/AREA URNS HPF: 0 /HPF (ref 0–5)

## 2019-03-26 PROCEDURE — 84484 ASSAY OF TROPONIN QUANT: CPT

## 2019-03-26 PROCEDURE — G0378 HOSPITAL OBSERVATION PER HR: HCPCS

## 2019-03-26 PROCEDURE — 25000003 PHARM REV CODE 250: Performed by: NURSE PRACTITIONER

## 2019-03-26 PROCEDURE — 63600175 PHARM REV CODE 636 W HCPCS: Performed by: NURSE PRACTITIONER

## 2019-03-26 PROCEDURE — 99285 EMERGENCY DEPT VISIT HI MDM: CPT | Mod: 25

## 2019-03-26 PROCEDURE — 80053 COMPREHEN METABOLIC PANEL: CPT

## 2019-03-26 PROCEDURE — 63600175 PHARM REV CODE 636 W HCPCS: Performed by: EMERGENCY MEDICINE

## 2019-03-26 PROCEDURE — 25000003 PHARM REV CODE 250: Performed by: EMERGENCY MEDICINE

## 2019-03-26 PROCEDURE — 93010 ELECTROCARDIOGRAM REPORT: CPT | Mod: ,,, | Performed by: INTERNAL MEDICINE

## 2019-03-26 PROCEDURE — 96372 THER/PROPH/DIAG INJ SC/IM: CPT | Mod: 59

## 2019-03-26 PROCEDURE — 83735 ASSAY OF MAGNESIUM: CPT

## 2019-03-26 PROCEDURE — 96374 THER/PROPH/DIAG INJ IV PUSH: CPT

## 2019-03-26 PROCEDURE — 82550 ASSAY OF CK (CPK): CPT

## 2019-03-26 PROCEDURE — 82962 GLUCOSE BLOOD TEST: CPT

## 2019-03-26 PROCEDURE — 81000 URINALYSIS NONAUTO W/SCOPE: CPT

## 2019-03-26 PROCEDURE — 86803 HEPATITIS C AB TEST: CPT

## 2019-03-26 PROCEDURE — 85025 COMPLETE CBC W/AUTO DIFF WBC: CPT

## 2019-03-26 PROCEDURE — 93010 EKG 12-LEAD: ICD-10-PCS | Mod: ,,, | Performed by: INTERNAL MEDICINE

## 2019-03-26 PROCEDURE — 84100 ASSAY OF PHOSPHORUS: CPT

## 2019-03-26 PROCEDURE — 36415 COLL VENOUS BLD VENIPUNCTURE: CPT

## 2019-03-26 PROCEDURE — 83880 ASSAY OF NATRIURETIC PEPTIDE: CPT

## 2019-03-26 PROCEDURE — 93005 ELECTROCARDIOGRAM TRACING: CPT

## 2019-03-26 RX ORDER — SODIUM CHLORIDE 0.9 % (FLUSH) 0.9 %
5 SYRINGE (ML) INJECTION
Status: DISCONTINUED | OUTPATIENT
Start: 2019-03-26 | End: 2019-03-27 | Stop reason: HOSPADM

## 2019-03-26 RX ORDER — TORSEMIDE 10 MG/1
20 TABLET ORAL DAILY
Status: DISCONTINUED | OUTPATIENT
Start: 2019-03-27 | End: 2019-03-27 | Stop reason: HOSPADM

## 2019-03-26 RX ORDER — METOPROLOL TARTRATE 25 MG/1
25 TABLET, FILM COATED ORAL 2 TIMES DAILY
Status: DISCONTINUED | OUTPATIENT
Start: 2019-03-26 | End: 2019-03-27 | Stop reason: HOSPADM

## 2019-03-26 RX ORDER — ATORVASTATIN CALCIUM 40 MG/1
80 TABLET, FILM COATED ORAL DAILY
Status: DISCONTINUED | OUTPATIENT
Start: 2019-03-27 | End: 2019-03-27 | Stop reason: HOSPADM

## 2019-03-26 RX ORDER — INSULIN ASPART 100 [IU]/ML
1-10 INJECTION, SOLUTION INTRAVENOUS; SUBCUTANEOUS
Status: DISCONTINUED | OUTPATIENT
Start: 2019-03-26 | End: 2019-03-27 | Stop reason: HOSPADM

## 2019-03-26 RX ORDER — ISOSORBIDE DINITRATE AND HYDRALAZINE HYDROCHLORIDE 37.5; 2 MG/1; MG/1
1 TABLET ORAL 3 TIMES DAILY
Status: DISCONTINUED | OUTPATIENT
Start: 2019-03-26 | End: 2019-03-27 | Stop reason: HOSPADM

## 2019-03-26 RX ORDER — CLONIDINE HYDROCHLORIDE 0.2 MG/1
0.2 TABLET ORAL
Status: COMPLETED | OUTPATIENT
Start: 2019-03-26 | End: 2019-03-26

## 2019-03-26 RX ORDER — SODIUM BICARBONATE 325 MG/1
325 TABLET ORAL 3 TIMES DAILY
Status: DISCONTINUED | OUTPATIENT
Start: 2019-03-26 | End: 2019-03-27 | Stop reason: HOSPADM

## 2019-03-26 RX ORDER — ASPIRIN 81 MG/1
81 TABLET ORAL DAILY
Status: DISCONTINUED | OUTPATIENT
Start: 2019-03-27 | End: 2019-03-27 | Stop reason: HOSPADM

## 2019-03-26 RX ORDER — CALCITRIOL 0.25 UG/1
0.25 CAPSULE ORAL DAILY
Status: DISCONTINUED | OUTPATIENT
Start: 2019-03-27 | End: 2019-03-27 | Stop reason: HOSPADM

## 2019-03-26 RX ORDER — ASPIRIN 325 MG
325 TABLET ORAL ONCE
Status: COMPLETED | OUTPATIENT
Start: 2019-03-26 | End: 2019-03-26

## 2019-03-26 RX ORDER — ACETAMINOPHEN 325 MG/1
650 TABLET ORAL EVERY 8 HOURS PRN
Status: DISCONTINUED | OUTPATIENT
Start: 2019-03-26 | End: 2019-03-26 | Stop reason: SDUPTHER

## 2019-03-26 RX ORDER — IBUPROFEN 200 MG
24 TABLET ORAL
Status: DISCONTINUED | OUTPATIENT
Start: 2019-03-26 | End: 2019-03-27 | Stop reason: HOSPADM

## 2019-03-26 RX ORDER — CLOPIDOGREL BISULFATE 75 MG/1
75 TABLET ORAL DAILY
Status: DISCONTINUED | OUTPATIENT
Start: 2019-03-27 | End: 2019-03-27 | Stop reason: HOSPADM

## 2019-03-26 RX ORDER — IBUPROFEN 200 MG
16 TABLET ORAL
Status: DISCONTINUED | OUTPATIENT
Start: 2019-03-26 | End: 2019-03-27 | Stop reason: HOSPADM

## 2019-03-26 RX ORDER — GABAPENTIN 100 MG/1
100 CAPSULE ORAL ONCE
Status: COMPLETED | OUTPATIENT
Start: 2019-03-26 | End: 2019-03-26

## 2019-03-26 RX ORDER — ACETAMINOPHEN 325 MG/1
650 TABLET ORAL EVERY 8 HOURS PRN
Status: DISCONTINUED | OUTPATIENT
Start: 2019-03-26 | End: 2019-03-27 | Stop reason: HOSPADM

## 2019-03-26 RX ORDER — ONDANSETRON 2 MG/ML
4 INJECTION INTRAMUSCULAR; INTRAVENOUS EVERY 12 HOURS PRN
Status: DISCONTINUED | OUTPATIENT
Start: 2019-03-26 | End: 2019-03-27 | Stop reason: HOSPADM

## 2019-03-26 RX ORDER — GLUCAGON 1 MG
1 KIT INJECTION
Status: DISCONTINUED | OUTPATIENT
Start: 2019-03-26 | End: 2019-03-27 | Stop reason: HOSPADM

## 2019-03-26 RX ORDER — HEPARIN SODIUM 5000 [USP'U]/ML
5000 INJECTION, SOLUTION INTRAVENOUS; SUBCUTANEOUS EVERY 8 HOURS
Status: DISCONTINUED | OUTPATIENT
Start: 2019-03-26 | End: 2019-03-27 | Stop reason: HOSPADM

## 2019-03-26 RX ORDER — HYDRALAZINE HYDROCHLORIDE 20 MG/ML
10 INJECTION INTRAMUSCULAR; INTRAVENOUS
Status: COMPLETED | OUTPATIENT
Start: 2019-03-26 | End: 2019-03-26

## 2019-03-26 RX ADMIN — HYDRALAZINE HYDROCHLORIDE 10 MG: 20 INJECTION INTRAMUSCULAR; INTRAVENOUS at 06:03

## 2019-03-26 RX ADMIN — ASPIRIN 325 MG ORAL TABLET 325 MG: 325 PILL ORAL at 07:03

## 2019-03-26 RX ADMIN — HEPARIN SODIUM 5000 UNITS: 5000 INJECTION, SOLUTION INTRAVENOUS; SUBCUTANEOUS at 10:03

## 2019-03-26 RX ADMIN — CLONIDINE HYDROCHLORIDE 0.2 MG: 0.2 TABLET ORAL at 06:03

## 2019-03-26 RX ADMIN — HYDRALAZINE HYDROCHLORIDE AND ISOSORBIDE DINITRATE 1 TABLET: 37.5; 2 TABLET, FILM COATED ORAL at 08:03

## 2019-03-26 RX ADMIN — SODIUM BICARBONATE TAB 325 MG 325 MG: 325 TAB at 10:03

## 2019-03-26 RX ADMIN — METOPROLOL TARTRATE 25 MG: 25 TABLET ORAL at 08:03

## 2019-03-26 RX ADMIN — GABAPENTIN 100 MG: 100 CAPSULE ORAL at 10:03

## 2019-03-26 NOTE — TELEPHONE ENCOUNTER
"Spoke with pt 213/93 today and Friday it was 208/100. Pt states it was running high since her infusion on Friday.  Pt denies, chest pain, sob, or any symptoms other than a "slight headache."  Will let Dr Holman know and ask him to advise. Pt will need a return call on her home number of   "

## 2019-03-26 NOTE — ED PROVIDER NOTES
SCRIBE #1 NOTE: I, Maricel Pat, am scribing for, and in the presence of, Dewey Husain MD. I have scribed the entire note.       History     Chief Complaint   Patient presents with    Hypertension     /93 at home with a headache       Review of patient's allergies indicates:   Allergen Reactions    Codeine Swelling    Darvon [propoxyphene] Swelling         History of Present Illness     HPI    3/26/2019, 5:39 PM  History obtained from the patient      History of Present Illness: Avril Monzon is a 74 y.o. female patient with a PMHx of HTN, HLD, CHF, CKD, CVA, seizures, CAD, and DM (type 2) who presents to the Emergency Department for evaluation of hypertension which onset gradually today. Pt reports that her BP at home was 213/93. Symptoms are constant and moderate in severity. No mitigating or exacerbating factors reported. Associated sxs include HA and BLE mild edema. Patient denies any n/v/d, CP, fever, chills, palpations, wheezing, dysuria, and all other sxs at this time. No further complaints or concerns at this time.     Arrival mode: Personal vehicle     PCP: Rose Parks MD        Past Medical History:  Past Medical History:   Diagnosis Date    Acute hypoxemic respiratory failure 11/26/2018    Anemia     Arthritis     back, hand, knees    Back pain     Cataract     CKD stage G4/A3, GFR 15 - 29 and albumin creatinine ratio >300 mg/g     GFR 40% Jan 2014 and 33% ub 3/2014 ([facility])    Coronary artery disease involving native coronary artery of native heart 11/30/2018    Diabetic retinopathy     DM (diabetes mellitus) type II controlled, neurological manifestation     Exudative age-related macular degeneration of left eye with active choroidal neovascularization 2/5/2019    GERD (gastroesophageal reflux disease)     Glaucoma     Heart failure     Hyperlipidemia     Hypertension     NSTEMI (non-ST elevated myocardial infarction) 11/27/2018    Peripheral neuropathy      Polyneuropathy     Proteinuria     >6 mo     Seizures     [facility] 2014 -dick CT NRI showed small vessel    Stroke ,    Tobacco dependence     Type 2 diabetes with peripheral circulatory disorder, controlled        Past Surgical History:  Past Surgical History:   Procedure Laterality Date    BREAST LUMPECTOMY Left     benign, when patient was 15 years old    BREAST MASS EXCISION      ,benign, age 15.    CATHETERIZATION, HEART, BOTH LEFT AND RIGHT N/A 2018    Performed by [facility] Holman MD at [facility] C[facility] LAB    CATHETERIZATION, HEART, BOTH LEFT AND RIGHT N/A 2018    Performed by Michelle Holman MD at [facility] CATH LAB    CATHETERIZATION, HEART, LEFT Left 12/3/2018    Performed by Michelle Holman MD at [facility] CATH LAB    HYSTERECTOMY  1982    OOPHORECTOMY      wrist cyst Right 1980s    dorsal wrist cyst         Family History:  Family History   Problem Relation Age of Onset    Diabetes Mother     Hyperlipidemia Mother     Hypertension Mother     Heart disease Mother         MI    Muscular dystrophy Son     Cancer Maternal Grandmother         colon       Social History:  Social History     Tobacco Use    Smoking status: Former Smoker     Packs/day: 1.50     Years: 30.00     Pack years: 45.00     Types: Cigarettes     Last attempt to quit: 1990     Years since quittin.2    Smokeless tobacco: Former User   Substance and Sexual Activity    Alcohol use: No     Alcohol/week: 0.0 oz    Drug use: No    Sexual activity: Never        Review of Systems     Review of Systems   Constitutional: Negative for chills and fever.   HENT: Negative for congestion and sore throat.    Respiratory: Negative for cough and shortness of breath.    Cardiovascular: Positive for leg swelling. Negative for chest pain and palpitations.        + hypertension   Gastrointestinal: Negative for abdominal pain, diarrhea, nausea and vomiting.   Genitourinary: Negative for dysuria.   Musculoskeletal: Negative for  "back pain.   Skin: Negative for rash.   Neurological: Positive for headaches. Negative for dizziness, syncope, weakness and light-headedness.   Hematological: Does not bruise/bleed easily.   All other systems reviewed and are negative.     Physical Exam     Initial Vitals   BP Pulse Resp Temp SpO2   03/26/19 1735 03/26/19 1708 03/26/19 1708 03/26/19 1708 03/26/19 1708   (!) 224/94 74 20 97.9 °F (36.6 °C) 100 %      MAP       --                 Physical Exam  Nursing Notes and Vital Signs Reviewed.  Constitutional: Patient is in no acute distress. Well-developed and well-nourished.  Head: Atraumatic. Normocephalic.  Eyes: PERRL. EOM intact. Conjunctivae are not pale. No scleral icterus.  ENT: Mucous membranes are moist. Oropharynx is clear and symmetric.    Neck: Supple. Full ROM. No lymphadenopathy.  Cardiovascular: Regular rate. Regular rhythm. No murmurs, rubs, or gallops. Distal pulses are 2+ and symmetric.  Pulmonary/Chest: No respiratory distress. Clear to auscultation bilaterally. No wheezing or rales.  Abdominal: Soft and non-distended.  There is no tenderness.  No rebound, guarding, or rigidity. Good bowel sounds.  Genitourinary: No CVA tenderness  Musculoskeletal: Moves all extremities. No obvious deformities. No calf tenderness. 1+ pitting edema of BLE.   Skin: Warm and dry.  Neurological:  Alert, awake, and appropriate.  Normal speech.  No acute focal neurological deficits are appreciated.  Psychiatric: Normal affect. Good eye contact. Appropriate in content.     ED Course   Procedures  ED Vital Signs:  Vitals:    03/26/19 1708 03/26/19 1735 03/26/19 1750 03/26/19 1800   BP:  (!) 224/94 (!) 228/96 (!) 211/95   Pulse: 74 70 68 64   Resp: 20  (!) 23 (!) 23   Temp: 97.9 °F (36.6 °C)      TempSrc: Oral      SpO2: 100% 100% 99% 100%   Weight: 66.7 kg (147 lb 0.8 oz)      Height: 5' 5" (1.651 m)       03/26/19 1817 03/26/19 1832 03/26/19 1848 03/26/19 1902   BP: (!) 203/97 (!) 197/87 (!) 183/79 (!) 164/66 "   Pulse: 79 71 69 74   Resp: 19 (!) 22 (!) 23 20   Temp:       TempSrc:       SpO2: 100% 100% 100% 100%   Weight:       Height:           Abnormal Lab Results:  Labs Reviewed   CBC W/ AUTO DIFFERENTIAL - Abnormal; Notable for the following components:       Result Value    RBC 3.17 (*)     Hemoglobin 9.6 (*)     Hematocrit 29.6 (*)     Gran% 77.4 (*)     Lymph% 15.9 (*)     All other components within normal limits   COMPREHENSIVE METABOLIC PANEL - Abnormal; Notable for the following components:    Glucose 59 (*)     BUN, Bld 61 (*)     Creatinine 3.3 (*)     eGFR if  15 (*)     eGFR if non  13 (*)     All other components within normal limits   URINALYSIS, REFLEX TO URINE CULTURE - Abnormal; Notable for the following components:    Protein, UA 2+ (*)     All other components within normal limits    Narrative:     Preferred Collection Type->Urine, Clean Catch   B-TYPE NATRIURETIC PEPTIDE - Abnormal; Notable for the following components:     (*)     All other components within normal limits   CK - Abnormal; Notable for the following components:     (*)     All other components within normal limits   TROPONIN I - Abnormal; Notable for the following components:    Troponin I 0.083 (*)     All other components within normal limits   URINALYSIS MICROSCOPIC    Narrative:     Preferred Collection Type->Urine, Clean Catch   MAGNESIUM   PHOSPHORUS   HEPATITIS C ANTIBODY   PHOSPHORUS   MAGNESIUM        All Lab Results:  Results for orders placed or performed during the hospital encounter of 03/26/19   CBC auto differential   Result Value Ref Range    WBC 6.87 3.90 - 12.70 K/uL    RBC 3.17 (L) 4.00 - 5.40 M/uL    Hemoglobin 9.6 (L) 12.0 - 16.0 g/dL    Hematocrit 29.6 (L) 37.0 - 48.5 %    MCV 93 82 - 98 fL    MCH 30.3 27.0 - 31.0 pg    MCHC 32.4 32.0 - 36.0 g/dL    RDW 13.2 11.5 - 14.5 %    Platelets 186 150 - 350 K/uL    MPV 9.4 9.2 - 12.9 fL    Gran # (ANC) 5.3 1.8 - 7.7 K/uL     Lymph # 1.1 1.0 - 4.8 K/uL    Mono # 0.4 0.3 - 1.0 K/uL    Eos # 0.1 0.0 - 0.5 K/uL    Baso # 0.01 0.00 - 0.20 K/uL    Gran% 77.4 (H) 38.0 - 73.0 %    Lymph% 15.9 (L) 18.0 - 48.0 %    Mono% 5.7 4.0 - 15.0 %    Eosinophil% 0.9 0.0 - 8.0 %    Basophil% 0.1 0.0 - 1.9 %    Differential Method Automated    Comprehensive metabolic panel   Result Value Ref Range    Sodium 144 136 - 145 mmol/L    Potassium 3.7 3.5 - 5.1 mmol/L    Chloride 107 95 - 110 mmol/L    CO2 25 23 - 29 mmol/L    Glucose 59 (L) 70 - 110 mg/dL    BUN, Bld 61 (H) 8 - 23 mg/dL    Creatinine 3.3 (H) 0.5 - 1.4 mg/dL    Calcium 9.3 8.7 - 10.5 mg/dL    Total Protein 6.6 6.0 - 8.4 g/dL    Albumin 3.8 3.5 - 5.2 g/dL    Total Bilirubin 0.3 0.1 - 1.0 mg/dL    Alkaline Phosphatase 92 55 - 135 U/L    AST 18 10 - 40 U/L    ALT 28 10 - 44 U/L    Anion Gap 12 8 - 16 mmol/L    eGFR if African American 15 (A) >60 mL/min/1.73 m^2    eGFR if non African American 13 (A) >60 mL/min/1.73 m^2   Urinalysis, Reflex to Urine Culture Urine, Clean Catch   Result Value Ref Range    Specimen UA Urine, Clean Catch     Color, UA Yellow Yellow, Straw, Hanane    Appearance, UA Clear Clear    pH, UA 7.0 5.0 - 8.0    Specific Gravity, UA 1.010 1.005 - 1.030    Protein, UA 2+ (A) Negative    Glucose, UA Negative Negative    Ketones, UA Negative Negative    Bilirubin (UA) Negative Negative    Occult Blood UA Negative Negative    Nitrite, UA Negative Negative    Urobilinogen, UA Negative <2.0 EU/dL    Leukocytes, UA Negative Negative   Brain natriuretic peptide   Result Value Ref Range     (H) 0 - 99 pg/mL   CK   Result Value Ref Range     (H) 20 - 180 U/L   Troponin I   Result Value Ref Range    Troponin I 0.083 (H) 0.000 - 0.026 ng/mL   Urinalysis Microscopic   Result Value Ref Range    RBC, UA 3 0 - 4 /hpf    WBC, UA 0 0 - 5 /hpf    Bacteria, UA None None-Occ /hpf    Squam Epithel, UA 0 /hpf    Hyaline Casts, UA 0 0-1/lpf /lpf    Microscopic Comment SEE COMMENT           Imaging Results:  Imaging Results          X-Ray Chest AP Portable (Final result)  Result time 03/26/19 18:15:47    Final result by Pedro Montoya III, MD (03/26/19 18:15:47)                 Impression:      No acute abnormality identified in the chest.      Electronically signed by: Pedro Montoya MD  Date:    03/26/2019  Time:    18:15             Narrative:    EXAMINATION:  XR CHEST AP PORTABLE    CLINICAL HISTORY:  Hypertension;    COMPARISON:  12/10/2018    FINDINGS:  Heart size is upper limits of normal, improved since prior exam.  The lungs appear clear of active disease. No acute appearing infiltrate, pleural effusion or pneumothorax identified.                                 The EKG was ordered, reviewed, and independently interpreted by the ED provider.  Interpretation time: 17:24   Rate: 70 BPM  Rhythm: normal sinus rhythm  Interpretation: No STEMI.         The Emergency Provider reviewed the vital signs and test results, which are outlined above.     ED Discussion     7:35 PM: Re-evaluated pt. I have discussed test results, shared treatment plan, and the need for admission with patient and family at bedside. Pt and family express understanding at this time and agree with all information. All questions answered. Pt and family have no further questions or concerns at this time. Pt is ready for admit.    7:35 PM: Discussed case with Dr. Arias (Hospital Medicine). Dr. Arias agrees with current care and management of pt and accepts admission.   Admitting Service: Hospital Medicine  Admitting Physician: Dr. Arias  Admit to: Obs/ Tele        ED Medication(s):  Medications   sodium chloride 0.9% flush 5 mL (has no administration in time range)   ondansetron injection 4 mg (has no administration in time range)   promethazine (PHENERGAN) 6.25 mg in dextrose 5 % 50 mL IVPB (has no administration in time range)   aspirin EC tablet 81 mg (has no administration in time range)   atorvastatin tablet 80 mg (has no  administration in time range)   clopidogrel tablet 75 mg (has no administration in time range)   isosorbide-hydrALAZINE 20-37.5 mg per tablet 1 tablet (has no administration in time range)   metoprolol tartrate (LOPRESSOR) tablet 25 mg (has no administration in time range)   sodium bicarbonate tablet 325 mg (has no administration in time range)   torsemide tablet 20 mg (has no administration in time range)   sodium chloride 0.9% flush 5 mL (has no administration in time range)   acetaminophen tablet 650 mg (has no administration in time range)   glucose chewable tablet 16 g (has no administration in time range)   glucose chewable tablet 24 g (has no administration in time range)   dextrose 50% injection 12.5 g (has no administration in time range)   dextrose 50% injection 25 g (has no administration in time range)   glucagon (human recombinant) injection 1 mg (has no administration in time range)   heparin (porcine) injection 5,000 Units (has no administration in time range)   insulin aspart U-100 pen 1-10 Units (has no administration in time range)   calcitRIOL capsule 0.25 mcg (has no administration in time range)   cloNIDine tablet 0.2 mg (0.2 mg Oral Given 3/26/19 1811)   hydrALAZINE injection 10 mg (10 mg Intravenous Given 3/26/19 1831)   aspirin tablet 325 mg (325 mg Oral Given 3/26/19 1916)       New Prescriptions    No medications on file                 Medical Decision Making:   Clinical Tests:   Lab Tests: Ordered and Reviewed  Radiological Study: Ordered and Reviewed  Medical Tests: Ordered and Reviewed             Scribe Attestation:   Scribe #1: I performed the above scribed service and the documentation accurately describes the services I performed. I attest to the accuracy of the note.     Attending:   Physician Attestation Statement for Scribe #1: I, Dewey Husain MD, personally performed the services described in this documentation, as scribed by Maricel Pat, in my presence, and it is both  accurate and complete.           Clinical Impression       ICD-10-CM ICD-9-CM   1. Hypertensive urgency I16.0 401.9   2. Hypertension I10 401.9   3. GREGG on CKD, stage IV N18.4 585.4   4. Elevated troponin R74.8 790.6       Disposition:   Disposition: Placed in Observation  Condition: Fair         Dewey Husain MD  03/26/19 1944

## 2019-03-26 NOTE — TELEPHONE ENCOUNTER
Spoke with pt and let her know she needs to go to the ER or after hours clinic as her pressure is too high, per Dr Holman.  Pt stated she would do this.

## 2019-03-27 VITALS
RESPIRATION RATE: 16 BRPM | BODY MASS INDEX: 24.94 KG/M2 | WEIGHT: 149.69 LBS | DIASTOLIC BLOOD PRESSURE: 60 MMHG | HEIGHT: 65 IN | TEMPERATURE: 99 F | SYSTOLIC BLOOD PRESSURE: 123 MMHG | HEART RATE: 66 BPM | OXYGEN SATURATION: 98 %

## 2019-03-27 PROBLEM — I16.0 HYPERTENSIVE URGENCY: Status: RESOLVED | Noted: 2019-03-26 | Resolved: 2019-03-27

## 2019-03-27 LAB
ANION GAP SERPL CALC-SCNC: 9 MMOL/L (ref 8–16)
APTT BLDCRRT: 24.2 SEC (ref 21–32)
BASOPHILS # BLD AUTO: 0.01 K/UL (ref 0–0.2)
BASOPHILS NFR BLD: 0.2 % (ref 0–1.9)
BUN SERPL-MCNC: 61 MG/DL (ref 8–23)
CALCIUM SERPL-MCNC: 8.8 MG/DL (ref 8.7–10.5)
CHLORIDE SERPL-SCNC: 110 MMOL/L (ref 95–110)
CHOLEST SERPL-MCNC: 150 MG/DL (ref 120–199)
CHOLEST/HDLC SERPL: 3.3 {RATIO} (ref 2–5)
CO2 SERPL-SCNC: 26 MMOL/L (ref 23–29)
CREAT SERPL-MCNC: 3.1 MG/DL (ref 0.5–1.4)
DIFFERENTIAL METHOD: ABNORMAL
EOSINOPHIL # BLD AUTO: 0.1 K/UL (ref 0–0.5)
EOSINOPHIL NFR BLD: 1 % (ref 0–8)
ERYTHROCYTE [DISTWIDTH] IN BLOOD BY AUTOMATED COUNT: 13.3 % (ref 11.5–14.5)
EST. GFR  (AFRICAN AMERICAN): 17 ML/MIN/1.73 M^2
EST. GFR  (NON AFRICAN AMERICAN): 14 ML/MIN/1.73 M^2
ESTIMATED AVG GLUCOSE: 154 MG/DL (ref 68–131)
GLUCOSE SERPL-MCNC: 57 MG/DL (ref 70–110)
HBA1C MFR BLD HPLC: 7 % (ref 4–5.6)
HCT VFR BLD AUTO: 26.1 % (ref 37–48.5)
HCV AB SERPL QL IA: NEGATIVE
HDLC SERPL-MCNC: 45 MG/DL (ref 40–75)
HDLC SERPL: 30 % (ref 20–50)
HGB BLD-MCNC: 8.5 G/DL (ref 12–16)
INR PPP: 0.9 (ref 0.8–1.2)
LDLC SERPL CALC-MCNC: 90 MG/DL (ref 63–159)
LYMPHOCYTES # BLD AUTO: 1.1 K/UL (ref 1–4.8)
LYMPHOCYTES NFR BLD: 23.5 % (ref 18–48)
MCH RBC QN AUTO: 30.5 PG (ref 27–31)
MCHC RBC AUTO-ENTMCNC: 32.6 G/DL (ref 32–36)
MCV RBC AUTO: 94 FL (ref 82–98)
MONOCYTES # BLD AUTO: 0.3 K/UL (ref 0.3–1)
MONOCYTES NFR BLD: 6.8 % (ref 4–15)
NEUTROPHILS # BLD AUTO: 3.3 K/UL (ref 1.8–7.7)
NEUTROPHILS NFR BLD: 68.5 % (ref 38–73)
NONHDLC SERPL-MCNC: 105 MG/DL
PLATELET # BLD AUTO: 173 K/UL (ref 150–350)
PMV BLD AUTO: 9.4 FL (ref 9.2–12.9)
POCT GLUCOSE: 112 MG/DL (ref 70–110)
POCT GLUCOSE: 132 MG/DL (ref 70–110)
POCT GLUCOSE: 59 MG/DL (ref 70–110)
POTASSIUM SERPL-SCNC: 3.8 MMOL/L (ref 3.5–5.1)
PROTHROMBIN TIME: 10 SEC (ref 9–12.5)
RBC # BLD AUTO: 2.79 M/UL (ref 4–5.4)
SODIUM SERPL-SCNC: 145 MMOL/L (ref 136–145)
TRIGL SERPL-MCNC: 75 MG/DL (ref 30–150)
TROPONIN I SERPL DL<=0.01 NG/ML-MCNC: 0.08 NG/ML (ref 0–0.03)
TROPONIN I SERPL DL<=0.01 NG/ML-MCNC: 0.12 NG/ML (ref 0–0.03)
TROPONIN I SERPL DL<=0.01 NG/ML-MCNC: 0.14 NG/ML (ref 0–0.03)
TSH SERPL DL<=0.005 MIU/L-ACNC: 1.74 UIU/ML (ref 0.4–4)
WBC # BLD AUTO: 4.86 K/UL (ref 3.9–12.7)

## 2019-03-27 PROCEDURE — 96372 THER/PROPH/DIAG INJ SC/IM: CPT

## 2019-03-27 PROCEDURE — 85610 PROTHROMBIN TIME: CPT

## 2019-03-27 PROCEDURE — 84484 ASSAY OF TROPONIN QUANT: CPT

## 2019-03-27 PROCEDURE — 25000003 PHARM REV CODE 250: Performed by: NURSE PRACTITIONER

## 2019-03-27 PROCEDURE — 80048 BASIC METABOLIC PNL TOTAL CA: CPT

## 2019-03-27 PROCEDURE — 84443 ASSAY THYROID STIM HORMONE: CPT

## 2019-03-27 PROCEDURE — 85730 THROMBOPLASTIN TIME PARTIAL: CPT

## 2019-03-27 PROCEDURE — 99220 PR INITIAL OBSERVATION CARE,LEVL III: ICD-10-PCS | Mod: ,,, | Performed by: INTERNAL MEDICINE

## 2019-03-27 PROCEDURE — 36415 COLL VENOUS BLD VENIPUNCTURE: CPT

## 2019-03-27 PROCEDURE — 63600175 PHARM REV CODE 636 W HCPCS: Performed by: NURSE PRACTITIONER

## 2019-03-27 PROCEDURE — 99220 PR INITIAL OBSERVATION CARE,LEVL III: CPT | Mod: ,,, | Performed by: INTERNAL MEDICINE

## 2019-03-27 PROCEDURE — 83036 HEMOGLOBIN GLYCOSYLATED A1C: CPT

## 2019-03-27 PROCEDURE — 85025 COMPLETE CBC W/AUTO DIFF WBC: CPT

## 2019-03-27 PROCEDURE — G0378 HOSPITAL OBSERVATION PER HR: HCPCS

## 2019-03-27 PROCEDURE — 80061 LIPID PANEL: CPT

## 2019-03-27 RX ORDER — GABAPENTIN 100 MG/1
100 CAPSULE ORAL 3 TIMES DAILY PRN
Status: DISCONTINUED | OUTPATIENT
Start: 2019-03-27 | End: 2019-03-27 | Stop reason: HOSPADM

## 2019-03-27 RX ADMIN — HEPARIN SODIUM 5000 UNITS: 5000 INJECTION, SOLUTION INTRAVENOUS; SUBCUTANEOUS at 05:03

## 2019-03-27 RX ADMIN — CLOPIDOGREL BISULFATE 75 MG: 75 TABLET ORAL at 08:03

## 2019-03-27 RX ADMIN — Medication 16 G: at 05:03

## 2019-03-27 RX ADMIN — METOPROLOL TARTRATE 25 MG: 25 TABLET ORAL at 08:03

## 2019-03-27 RX ADMIN — CALCITRIOL 0.25 MCG: 0.25 CAPSULE ORAL at 08:03

## 2019-03-27 RX ADMIN — ATORVASTATIN CALCIUM 80 MG: 40 TABLET, FILM COATED ORAL at 08:03

## 2019-03-27 RX ADMIN — HYDRALAZINE HYDROCHLORIDE AND ISOSORBIDE DINITRATE 1 TABLET: 37.5; 2 TABLET, FILM COATED ORAL at 08:03

## 2019-03-27 RX ADMIN — HYDRALAZINE HYDROCHLORIDE AND ISOSORBIDE DINITRATE 1 TABLET: 37.5; 2 TABLET, FILM COATED ORAL at 03:03

## 2019-03-27 RX ADMIN — ASPIRIN 81 MG: 81 TABLET, COATED ORAL at 08:03

## 2019-03-27 RX ADMIN — SODIUM BICARBONATE TAB 325 MG 325 MG: 325 TAB at 08:03

## 2019-03-27 RX ADMIN — SODIUM BICARBONATE TAB 325 MG 325 MG: 325 TAB at 03:03

## 2019-03-27 RX ADMIN — GABAPENTIN 100 MG: 100 CAPSULE ORAL at 06:03

## 2019-03-27 RX ADMIN — TORSEMIDE 20 MG: 10 TABLET ORAL at 08:03

## 2019-03-27 NOTE — SUBJECTIVE & OBJECTIVE
Past Medical History:   Diagnosis Date    Acute hypoxemic respiratory failure 11/26/2018    Anemia     Arthritis     back, hand, knees    Back pain     Cataract     CKD stage G4/A3, GFR 15 - 29 and albumin creatinine ratio >300 mg/g     GFR 40% Jan 2014 and 33% ub 3/2014 ([facility])    Coronary artery disease involving native coronary artery of native heart 11/30/2018    Diabetic retinopathy     DM (diabetes mellitus) type II controlled, neurological manifestation     Exudative age-related macular degeneration of left eye with active choroidal neovascularization 2/5/2019    GERD (gastroesophageal reflux disease)     Glaucoma     Heart failure     Hyperlipidemia     Hypertension     NSTEMI (non-ST elevated myocardial infarction) 11/27/2018    Peripheral neuropathy     Polyneuropathy     Proteinuria     >6 mo     Seizures     [facility] 1/2014 -dick CT NRI showed small vessel    Stroke 2012,2014    Tobacco dependence     Type 2 diabetes with peripheral circulatory disorder, controlled        Past Surgical History:   Procedure Laterality Date    BREAST LUMPECTOMY Left     benign, when patient was 15 years old    BREAST MASS EXCISION      ,benign, age 15.    CATHETERIZATION, HEART, BOTH LEFT AND RIGHT N/A 11/30/2018    Performed by [facility] at [facility] CATH LAB    CATHETERIZATION, HEART, BOTH LEFT AND RIGHT N/A 11/28/2018    Performed by [facility] at [facility] CATH LAB    CATHETERIZATION, HEART, LEFT Left 12/3/2018    Performed by [facility] at [facility] CATH LAB    HYSTERECTOMY  1982    OOPHORECTOMY      wrist cyst Right 1980s    dorsal wrist cyst       Review of patient's allergies indicates:   Allergen Reactions    Codeine Swelling    Darvon [propoxyphene] Swelling       No current facility-administered medications on file prior to encounter.      Current Outpatient Medications on File Prior to Encounter   Medication Sig    aspirin (ECOTRIN) 81 MG EC tablet Take 1 tablet (81 mg total)  by mouth once daily.    atorvastatin (LIPITOR) 80 MG tablet Take 1 tablet (80 mg total) by mouth once daily.    blood sugar diagnostic Strp 1 strip by Misc.(Non-Drug; Combo Route) route 3 (three) times daily. relion ultima    calcifediol (RAYALDEE) 30 mcg Cs24 Take one ccapsule by mouth once daily.    clopidogrel (PLAVIX) 75 mg tablet Take 1 tablet (75 mg total) by mouth once daily.    insulin NPH-insulin regular, 70/30, (NOVOLIN 70/30) 100 unit/mL (70-30) injection INJECT SUBCUTANEOUSLY 30 UNITS IN THE MORNING AND INJECT 25 UNITS IN THE EVENING (Patient taking differently: INJECT SUBCUTANEOUSLY 30 UNITS IN THE MORNING AND INJECT 25 UNITS IN THE EVENING)    isosorbide-hydrALAZINE 20-37.5 mg (BIDIL) 20-37.5 mg Tab Take 1 tablet by mouth 3 (three) times daily.    lancets (ACCU-CHEK SOFTCLIX LANCETS) Misc 1 lancet by Misc.(Non-Drug; Combo Route) route 3 (three) times daily.    metoprolol tartrate (LOPRESSOR) 25 MG tablet Take 1 tablet (25 mg total) by mouth 2 (two) times daily.    nitroGLYCERIN (NITROSTAT) 0.4 MG SL tablet Place 1 tablet (0.4 mg total) under the tongue every 5 (five) minutes as needed for Chest pain.    polymyxin B sulf-trimethoprim (POLYTRIM) 10,000 unit- 1 mg/mL Drop Place 1 drop into the left eye 4 (four) times daily. for 4 days    sodium bicarbonate 325 MG tablet TAKE ONE TABLET BY MOUTH THREE TIMES DAILY    torsemide (DEMADEX) 20 MG Tab Take 1 tablet (20 mg total) by mouth once daily.     Family History     Problem Relation (Age of Onset)    Cancer Maternal Grandmother    Diabetes Mother    Heart disease Mother    Hyperlipidemia Mother    Hypertension Mother    Muscular dystrophy Son        Tobacco Use    Smoking status: Former Smoker     Packs/day: 1.50     Years: 30.00     Pack years: 45.00     Types: Cigarettes     Last attempt to quit: 1990     Years since quittin.2    Smokeless tobacco: Former User   Substance and Sexual Activity    Alcohol use: No     Alcohol/week:  0.0 oz    Drug use: No    Sexual activity: Never     Review of Systems   Constitution: Negative for malaise/fatigue.   HENT: Negative for hearing loss and hoarse voice.    Eyes: Negative for blurred vision and visual disturbance.   Cardiovascular: Negative for chest pain, claudication, dyspnea on exertion, irregular heartbeat, leg swelling, near-syncope, orthopnea, palpitations, paroxysmal nocturnal dyspnea and syncope.        +HTN   Respiratory: Negative for cough, hemoptysis, shortness of breath, sleep disturbances due to breathing, snoring and wheezing.    Endocrine: Negative for cold intolerance and heat intolerance.   Hematologic/Lymphatic: Bruises/bleeds easily.   Skin: Negative for color change, dry skin and nail changes.   Musculoskeletal: Positive for arthritis and back pain. Negative for joint pain and myalgias.   Gastrointestinal: Negative for bloating, abdominal pain, constipation, nausea and vomiting.   Genitourinary: Negative for dysuria, flank pain, hematuria and hesitancy.   Neurological: Positive for headaches. Negative for light-headedness, loss of balance, numbness, paresthesias and weakness.   Psychiatric/Behavioral: Negative for altered mental status.   Allergic/Immunologic: Negative for environmental allergies.     Objective:     Vital Signs (Most Recent):  Temp: 98.7 °F (37.1 °C) (03/27/19 1127)  Pulse: 66 (03/27/19 1127)  Resp: 16 (03/27/19 1127)  BP: 123/60 (03/27/19 1127)  SpO2: 98 % (03/27/19 1127) Vital Signs (24h Range):  Temp:  [97.8 °F (36.6 °C)-98.7 °F (37.1 °C)] 98.7 °F (37.1 °C)  Pulse:  [58-79] 66  Resp:  [16-25] 16  SpO2:  [97 %-100 %] 98 %  BP: (116-228)/(59-97) 123/60     Weight: 67.9 kg (149 lb 11.1 oz)  Body mass index is 24.91 kg/m².    SpO2: 98 %  O2 Device (Oxygen Therapy): room air      Intake/Output Summary (Last 24 hours) at 3/27/2019 1537  Last data filed at 3/27/2019 1300  Gross per 24 hour   Intake 840 ml   Output 800 ml   Net 40 ml       Lines/Drains/Airways      Peripheral Intravenous Line                 Peripheral IV - Single Lumen 03/26/19 1800 Right Antecubital less than 1 day                Physical Exam   Constitutional: She is oriented to person, place, and time. She appears well-developed and well-nourished. No distress.   HENT:   Head: Normocephalic and atraumatic.   Neck: Normal range of motion and full passive range of motion without pain. Neck supple. No JVD present.   Cardiovascular: Normal rate, regular rhythm, S1 normal, S2 normal and intact distal pulses. PMI is not displaced. Exam reveals no distant heart sounds.   Murmur heard.  Pulses:       Radial pulses are 2+ on the right side, and 2+ on the left side.        Dorsalis pedis pulses are 2+ on the right side, and 2+ on the left side.   Pulmonary/Chest: Effort normal and breath sounds normal. No accessory muscle usage. No respiratory distress. She has no decreased breath sounds. She has no wheezes. She has no rales.   Abdominal: Soft. Bowel sounds are normal. She exhibits no distension. There is no tenderness.   Musculoskeletal: Normal range of motion. She exhibits no edema.        Right ankle: She exhibits no swelling.        Left ankle: She exhibits no swelling.   Neurological: She is alert and oriented to person, place, and time.   Skin: Skin is warm and dry. She is not diaphoretic. No cyanosis. Nails show no clubbing.   Psychiatric: She has a normal mood and affect. Her speech is normal and behavior is normal. Judgment and thought content normal. Cognition and memory are normal.   Nursing note and vitals reviewed.      Significant Labs:   BMP:   Recent Labs   Lab 03/26/19  1800 03/27/19  0505   GLU 59* 57*    145   K 3.7 3.8    110   CO2 25 26   BUN 61* 61*   CREATININE 3.3* 3.1*   CALCIUM 9.3 8.8   MG 1.8  --    , CBC   Recent Labs   Lab 03/26/19  1800 03/27/19  0505   WBC 6.87 4.86   HGB 9.6* 8.5*   HCT 29.6* 26.1*    173   , Lipid Panel   Recent Labs   Lab 03/27/19  0505   CHOL  150   HDL 45   LDLCALC 90.0   TRIG 75   CHOLHDL 30.0   , Troponin   Recent Labs   Lab 03/26/19  2321 03/27/19  0505 03/27/19  1129   TROPONINI 0.137* 0.121* 0.084*    and All pertinent lab results from the last 24 hours have been reviewed.    Significant Imaging: Echocardiogram:   2D echo with color flow doppler:   Results for orders placed or performed during the hospital encounter of 11/26/18   2D echo with color flow doppler   Result Value Ref Range    QEF 45 55 - 65    Mitral Valve Regurgitation MILD TO MODERATE     Diastolic Dysfunction Yes (A)     Est. PA Systolic Pressure 69.15 (A)     Tricuspid Valve Regurgitation MILD     and X-Ray: CXR: X-Ray Chest 1 View (CXR): No results found for this visit on 03/26/19.

## 2019-03-27 NOTE — HPI
Mrs. Monzon is a 74 year old female who presented to [facility] due to elevated blood pressure and headache. Her current medical conditions include HTN, HLP, CAD s/p recent coronary stenting per Dr. Holman, DM Type II. ED workup revealed /94, H/H 9.6/29.6, K+ 3.7, Cr 3.3, Troponin 0.083. She was placed in observation under the care of hospital medicine. Cardiology consulted to assist with medical management. Patient seen and examined in room, lying in bed. Denies chest pain or anginal equivalents. No shortness of breath, [place] or palpitations. No orthopnea, PND or abdominal bloating. Denies leg swelling or claudications today. Reports compliance with medications at home PTA but does endorse recent dietary indiscretions. Further recs to follow.

## 2019-03-27 NOTE — ASSESSMENT & PLAN NOTE
Blood pressure improved.  Continue patient's home medication therapy and monitor trends consider adjusting pending course.    Trend troponin.    Consider Cardiology consult pending course.  Further evaluation/diagnostics/interventions/consults pending course

## 2019-03-27 NOTE — ASSESSMENT & PLAN NOTE
Symptoms improve with reduction of blood pressure.  Suspect related to hypertension.  Patient's neuro exam benign no acute/focal deficits.   patient has been having fluctuating blood pressures and has been being monitored outpatient for this consider any medication adjustments especially with patient's isosorbide as cause to her headache if nitrite induced.    Monitor closely and Further evaluation/diagnostics/interventions/consults pending course

## 2019-03-27 NOTE — ASSESSMENT & PLAN NOTE
Continue ASA, Plavix,  Statin, BB, Diuretic, BP therapy.   no Ace or Arb therapy noted consider pending course.    Optimization pending course.    Consider consult Cardiology  Further evaluation/diagnostics/interventions/consults pending course

## 2019-03-27 NOTE — NURSING
"Patient assessed for diabetes educational needs following chart review  She reports was diagnosed over 30 years ago and her attended diabetes education class in the past.  She is interested in attending additional outpatient diabetes education classes and she will discuss with her PCP to order  She has a home glucose monitor  She is prescribed 70/30 insulin daily and admits to "skipping/forgetting"2-3 doses weekly.  She also takes "delayed" dose with no time consistency  Discussed several memory joggers to improve compliance with med's   She agrees to try these.   Verbalizes proper insulin storage.  Reviewed site selection/rotation info as she was mainly injecting in the same abdominal area  She verbalizes understanding   Literature provided  "

## 2019-03-27 NOTE — SUBJECTIVE & OBJECTIVE
Past Medical History:   Diagnosis Date    Acute hypoxemic respiratory failure 11/26/2018    Anemia     Arthritis     back, hand, knees    Back pain     Cataract     CKD stage G4/A3, GFR 15 - 29 and albumin creatinine ratio >300 mg/g     GFR 40% Jan 2014 and 33% ub 3/2014 ([facility])    Coronary artery disease involving native coronary artery of native heart 11/30/2018    Diabetic retinopathy     DM (diabetes mellitus) type II controlled, neurological manifestation     Exudative age-related macular degeneration of left eye with active choroidal neovascularization 2/5/2019    GERD (gastroesophageal reflux disease)     Glaucoma     Heart failure     Hyperlipidemia     Hypertension     NSTEMI (non-ST elevated myocardial infarction) 11/27/2018    Peripheral neuropathy     Polyneuropathy     Proteinuria     >6 mo     Seizures     [facility] 1/2014 -dick CT NRI showed small vessel    Stroke 2012,2014    Tobacco dependence     Type 2 diabetes with peripheral circulatory disorder, controlled        Past Surgical History:   Procedure Laterality Date    BREAST LUMPECTOMY Left     benign, when patient was 15 years old    BREAST MASS EXCISION      ,benign, age 15.    CATHETERIZATION, HEART, BOTH LEFT AND RIGHT N/A 11/30/2018    Performed by [facility] at [facility] CATH LAB    CATHETERIZATION, HEART, BOTH LEFT AND RIGHT N/A 11/28/2018    Performed by [facility] at [facility] CATH LAB    CATHETERIZATION, HEART, LEFT Left 12/3/2018    Performed by [facility] at [facility] CATH LAB    HYSTERECTOMY  1982    OOPHORECTOMY      wrist cyst Right 1980s    dorsal wrist cyst       Review of patient's allergies indicates:   Allergen Reactions    Codeine Swelling    Darvon [propoxyphene] Swelling       No current facility-administered medications on file prior to encounter.      Current Outpatient Medications on File Prior to Encounter   Medication Sig    aspirin (ECOTRIN) 81 MG EC tablet Take 1 tablet (81 mg total)  by mouth once daily.    atorvastatin (LIPITOR) 80 MG tablet Take 1 tablet (80 mg total) by mouth once daily.    blood sugar diagnostic Strp 1 strip by Misc.(Non-Drug; Combo Route) route 3 (three) times daily. relion ultima    calcifediol (RAYALDEE) 30 mcg Cs24 Take one ccapsule by mouth once daily.    clopidogrel (PLAVIX) 75 mg tablet Take 1 tablet (75 mg total) by mouth once daily.    insulin NPH-insulin regular, 70/30, (NOVOLIN 70/30) 100 unit/mL (70-30) injection INJECT SUBCUTANEOUSLY 30 UNITS IN THE MORNING AND INJECT 25 UNITS IN THE EVENING (Patient taking differently: INJECT SUBCUTANEOUSLY 30 UNITS IN THE MORNING AND INJECT 25 UNITS IN THE EVENING)    isosorbide-hydrALAZINE 20-37.5 mg (BIDIL) 20-37.5 mg Tab Take 1 tablet by mouth 3 (three) times daily.    lancets (ACCU-CHEK SOFTCLIX LANCETS) Misc 1 lancet by Misc.(Non-Drug; Combo Route) route 3 (three) times daily.    metoprolol tartrate (LOPRESSOR) 25 MG tablet Take 1 tablet (25 mg total) by mouth 2 (two) times daily.    nitroGLYCERIN (NITROSTAT) 0.4 MG SL tablet Place 1 tablet (0.4 mg total) under the tongue every 5 (five) minutes as needed for Chest pain.    polymyxin B sulf-trimethoprim (POLYTRIM) 10,000 unit- 1 mg/mL Drop Place 1 drop into the left eye 4 (four) times daily. for 4 days    sodium bicarbonate 325 MG tablet TAKE ONE TABLET BY MOUTH THREE TIMES DAILY    torsemide (DEMADEX) 20 MG Tab Take 1 tablet (20 mg total) by mouth once daily.     Family History     Problem Relation (Age of Onset)    Cancer Maternal Grandmother    Diabetes Mother    Heart disease Mother    Hyperlipidemia Mother    Hypertension Mother    Muscular dystrophy Son        Tobacco Use    Smoking status: Former Smoker     Packs/day: 1.50     Years: 30.00     Pack years: 45.00     Types: Cigarettes     Last attempt to quit: 1990     Years since quittin.2    Smokeless tobacco: Former User   Substance and Sexual Activity    Alcohol use: No     Alcohol/week:  0.0 oz    Drug use: No    Sexual activity: Never     Review of Systems   Constitutional: Negative for chills, diaphoresis, fatigue and fever.   HENT: Negative for congestion, sore throat and voice change.    Eyes: Negative for photophobia and visual disturbance.   Respiratory: Negative for cough, shortness of breath, wheezing and stridor.    Cardiovascular: Negative for chest pain and leg swelling.   Gastrointestinal: Negative for abdominal distention, abdominal pain, constipation, diarrhea, nausea and vomiting.   Endocrine: Negative for polydipsia, polyphagia and polyuria.   Genitourinary: Negative for difficulty urinating, dysuria, flank pain, pelvic pain, urgency and vaginal discharge.   Musculoskeletal: Negative for back pain, joint swelling, neck pain and neck stiffness.   Skin: Negative for color change and rash.   Allergic/Immunologic: Negative for immunocompromised state.   Neurological: Positive for headaches. Negative for dizziness, syncope, weakness and numbness.   Hematological: Does not bruise/bleed easily.   Psychiatric/Behavioral: Negative for agitation, behavioral problems and confusion.     Objective:     Vital Signs (Most Recent):  Temp: 98.4 °F (36.9 °C) (03/26/19 2053)  Pulse: 63 (03/26/19 2053)  Resp: 18 (03/26/19 2053)  BP: (!) 160/73 (03/26/19 2053)  SpO2: 99 % (03/26/19 2053) Vital Signs (24h Range):  Temp:  [97.9 °F (36.6 °C)-98.4 °F (36.9 °C)] 98.4 °F (36.9 °C)  Pulse:  [63-79] 63  Resp:  [17-25] 18  SpO2:  [98 %-100 %] 99 %  BP: (116-228)/(59-97) 160/73     Weight: 66.7 kg (147 lb 0.8 oz)  Body mass index is 24.47 kg/m².    Physical Exam   Constitutional: She is oriented to person, place, and time. She appears well-developed and well-nourished. No distress.   HENT:   Head: Normocephalic and atraumatic.   Nose: Nose normal.   Eyes: Pupils are equal, round, and reactive to light. Conjunctivae and EOM are normal. No scleral icterus.   Neck: Normal range of motion. Neck supple. No tracheal  deviation present.   Cardiovascular: Normal rate, regular rhythm, normal heart sounds and intact distal pulses.   No murmur heard.  Pulmonary/Chest: Effort normal and breath sounds normal. No stridor. No respiratory distress. She has no wheezes. She has no rales.   Abdominal: Soft. Bowel sounds are normal. She exhibits no distension. There is no tenderness. There is no guarding.   Musculoskeletal: Normal range of motion. She exhibits no edema or deformity.   Neurological: She is alert and oriented to person, place, and time. No cranial nerve deficit.   Skin: Skin is warm and dry. Capillary refill takes less than 2 seconds. No rash noted. She is not diaphoretic.   Psychiatric: She has a normal mood and affect. Her behavior is normal. Judgment and thought content normal.   Nursing note and vitals reviewed.        CRANIAL NERVES     CN III, IV, VI   Pupils are equal, round, and reactive to light.  Extraocular motions are normal.        Significant Labs: All pertinent labs within the past 24 hours have been reviewed.  Results for orders placed or performed during the hospital encounter of 03/26/19   CBC auto differential   Result Value Ref Range    WBC 6.87 3.90 - 12.70 K/uL    RBC 3.17 (L) 4.00 - 5.40 M/uL    Hemoglobin 9.6 (L) 12.0 - 16.0 g/dL    Hematocrit 29.6 (L) 37.0 - 48.5 %    MCV 93 82 - 98 fL    MCH 30.3 27.0 - 31.0 pg    MCHC 32.4 32.0 - 36.0 g/dL    RDW 13.2 11.5 - 14.5 %    Platelets 186 150 - 350 K/uL    MPV 9.4 9.2 - 12.9 fL    Gran # (ANC) 5.3 1.8 - 7.7 K/uL    Lymph # 1.1 1.0 - 4.8 K/uL    Mono # 0.4 0.3 - 1.0 K/uL    Eos # 0.1 0.0 - 0.5 K/uL    Baso # 0.01 0.00 - 0.20 K/uL    Gran% 77.4 (H) 38.0 - 73.0 %    Lymph% 15.9 (L) 18.0 - 48.0 %    Mono% 5.7 4.0 - 15.0 %    Eosinophil% 0.9 0.0 - 8.0 %    Basophil% 0.1 0.0 - 1.9 %    Differential Method Automated    Comprehensive metabolic panel   Result Value Ref Range    Sodium 144 136 - 145 mmol/L    Potassium 3.7 3.5 - 5.1 mmol/L    Chloride 107 95 - 110  mmol/L    CO2 25 23 - 29 mmol/L    Glucose 59 (L) 70 - 110 mg/dL    BUN, Bld 61 (H) 8 - 23 mg/dL    Creatinine 3.3 (H) 0.5 - 1.4 mg/dL    Calcium 9.3 8.7 - 10.5 mg/dL    Total Protein 6.6 6.0 - 8.4 g/dL    Albumin 3.8 3.5 - 5.2 g/dL    Total Bilirubin 0.3 0.1 - 1.0 mg/dL    Alkaline Phosphatase 92 55 - 135 U/L    AST 18 10 - 40 U/L    ALT 28 10 - 44 U/L    Anion Gap 12 8 - 16 mmol/L    eGFR if African American 15 (A) >60 mL/min/1.73 m^2    eGFR if non African American 13 (A) >60 mL/min/1.73 m^2   Urinalysis, Reflex to Urine Culture Urine, Clean Catch   Result Value Ref Range    Specimen UA Urine, Clean Catch     Color, UA Yellow Yellow, Straw, Hanane    Appearance, UA Clear Clear    pH, UA 7.0 5.0 - 8.0    Specific Gravity, UA 1.010 1.005 - 1.030    Protein, UA 2+ (A) Negative    Glucose, UA Negative Negative    Ketones, UA Negative Negative    Bilirubin (UA) Negative Negative    Occult Blood UA Negative Negative    Nitrite, UA Negative Negative    Urobilinogen, UA Negative <2.0 EU/dL    Leukocytes, UA Negative Negative   Brain natriuretic peptide   Result Value Ref Range     (H) 0 - 99 pg/mL   CK   Result Value Ref Range     (H) 20 - 180 U/L   Troponin I   Result Value Ref Range    Troponin I 0.083 (H) 0.000 - 0.026 ng/mL   Urinalysis Microscopic   Result Value Ref Range    RBC, UA 3 0 - 4 /hpf    WBC, UA 0 0 - 5 /hpf    Bacteria, UA None None-Occ /hpf    Squam Epithel, UA 0 /hpf    Hyaline Casts, UA 0 0-1/lpf /lpf    Microscopic Comment SEE COMMENT    Phosphorus   Result Value Ref Range    Phosphorus 3.5 2.7 - 4.5 mg/dL   Magnesium   Result Value Ref Range    Magnesium 1.8 1.6 - 2.6 mg/dL   POCT glucose   Result Value Ref Range    POCT Glucose 149 (H) 70 - 110 mg/dL       Significant Imaging: I have reviewed all pertinent imaging results/findings within the past 24 hours.   Imaging Results          X-Ray Chest AP Portable (Final result)  Result time 03/26/19 18:15:47    Final result by Pedro Montoya  III, MD (03/26/19 18:15:47)                 Impression:      No acute abnormality identified in the chest.      Electronically signed by: Pedro Montoya MD  Date:    03/26/2019  Time:    18:15             Narrative:    EXAMINATION:  XR CHEST AP PORTABLE    CLINICAL HISTORY:  Hypertension;    COMPARISON:  12/10/2018    FINDINGS:  Heart size is upper limits of normal, improved since prior exam.  The lungs appear clear of active disease. No acute appearing infiltrate, pleural effusion or pneumothorax identified.

## 2019-03-27 NOTE — NURSING
Blood sugar is 59 mg/dl.  Gave PRN glucose tablets.  Notified Sebastien Townsend NP. No new orders at this time.  Will recheck blood sugar.

## 2019-03-27 NOTE — ASSESSMENT & PLAN NOTE
Risk reduction: Check A1c, lipid panel, TSH,.    Sliding scale insulin.   diabetic cardiac diet    Monitor  Further evaluation/diagnostics/interventions/consults pending course

## 2019-03-27 NOTE — H&P
[facility] Hospital Medicine  History & Physical    Patient Name: Avril Monzon  MRN: [id number]  Admission Date: 3/26/2019  Attending Physician: Sole Arias MD   Primary Care Provider: Rose Parks MD         Patient information was obtained from patient, past medical records and ER records.     Subjective:     Principal Problem:Hypertensive urgency    Chief Complaint:   Chief Complaint   Patient presents with    Hypertension     /93 at home with a headache          HPI: 74-year-old female patient with recent history of PCI with stent about 3 months ago on aspirin and Plavix daily presents today with complaint of high blood pressure.  No modifying factors.  Associated symptoms include headache.  Prior treatment includes  Current home blood pressure regimen.   Patient reports she has been compliant with her medication plan, diet, anticoagulation/ antiplatelet therapy and routinely follows up with Cardiology.  Patient was seen in the emergency room was treated for high blood pressure with anti hypertensives with positive response and  Reduction in associated symptom of headache.  Patient's troponin initially in the emergency room 0.08.  Hospital Medicine consulted.  Patient placed in observation for continue monitoring.    Past Medical History:   Diagnosis Date    Acute hypoxemic respiratory failure 11/26/2018    Anemia     Arthritis     back, hand, knees    Back pain     Cataract     CKD stage G4/A3, GFR 15 - 29 and albumin creatinine ratio >300 mg/g     GFR 40% Jan 2014 and 33% ub 3/2014 ([facility])    Coronary artery disease involving native coronary artery of native heart 11/30/2018    Diabetic retinopathy     DM (diabetes mellitus) type II controlled, neurological manifestation     Exudative age-related macular degeneration of left eye with active choroidal neovascularization 2/5/2019    GERD (gastroesophageal reflux disease)     Glaucoma     Heart failure      Hyperlipidemia     Hypertension     NSTEMI (non-ST elevated myocardial infarction) 11/27/2018    Peripheral neuropathy     Polyneuropathy     Proteinuria     >6 mo     Seizures     BRG 1/2014 -dick CT NRI showed small vessel    Stroke 2012,2014    Tobacco dependence     Type 2 diabetes with peripheral circulatory disorder, controlled        Past Surgical History:   Procedure Laterality Date    BREAST LUMPECTOMY Left     benign, when patient was 15 years old    BREAST MASS EXCISION      ,benign, age 15.    CATHETERIZATION, HEART, BOTH LEFT AND RIGHT N/A 11/30/2018    Performed by [facility] at [facility] CATH LAB    CATHETERIZATION, HEART, BOTH LEFT AND RIGHT N/A 11/28/2018    Performed by [facility] at [facility] CATH LAB    CATHETERIZATION, HEART, LEFT Left 12/3/2018    Performed by [facility] at [facility] CATH LAB    HYSTERECTOMY  1982    OOPHORECTOMY      wrist cyst Right 1980s    dorsal wrist cyst       Review of patient's allergies indicates:   Allergen Reactions    Codeine Swelling    Darvon [propoxyphene] Swelling       No current facility-administered medications on file prior to encounter.      Current Outpatient Medications on File Prior to Encounter   Medication Sig    aspirin (ECOTRIN) 81 MG EC tablet Take 1 tablet (81 mg total) by mouth once daily.    atorvastatin (LIPITOR) 80 MG tablet Take 1 tablet (80 mg total) by mouth once daily.    blood sugar diagnostic Strp 1 strip by Misc.(Non-Drug; Combo Route) route 3 (three) times daily. relion ultima    calcifediol (RAYALDEE) 30 mcg Cs24 Take one ccapsule by mouth once daily.    clopidogrel (PLAVIX) 75 mg tablet Take 1 tablet (75 mg total) by mouth once daily.    insulin NPH-insulin regular, 70/30, (NOVOLIN 70/30) 100 unit/mL (70-30) injection INJECT SUBCUTANEOUSLY 30 UNITS IN THE MORNING AND INJECT 25 UNITS IN THE EVENING (Patient taking differently: INJECT SUBCUTANEOUSLY 30 UNITS IN THE MORNING AND INJECT 25 UNITS IN THE  EVENING)    isosorbide-hydrALAZINE 20-37.5 mg (BIDIL) 20-37.5 mg Tab Take 1 tablet by mouth 3 (three) times daily.    lancets (ACCU-CHEK SOFTCLIX LANCETS) Misc 1 lancet by Misc.(Non-Drug; Combo Route) route 3 (three) times daily.    metoprolol tartrate (LOPRESSOR) 25 MG tablet Take 1 tablet (25 mg total) by mouth 2 (two) times daily.    nitroGLYCERIN (NITROSTAT) 0.4 MG SL tablet Place 1 tablet (0.4 mg total) under the tongue every 5 (five) minutes as needed for Chest pain.    polymyxin B sulf-trimethoprim (POLYTRIM) 10,000 unit- 1 mg/mL Drop Place 1 drop into the left eye 4 (four) times daily. for 4 days    sodium bicarbonate 325 MG tablet TAKE ONE TABLET BY MOUTH THREE TIMES DAILY    torsemide (DEMADEX) 20 MG Tab Take 1 tablet (20 mg total) by mouth once daily.     Family History     Problem Relation (Age of Onset)    Cancer Maternal Grandmother    Diabetes Mother    Heart disease Mother    Hyperlipidemia Mother    Hypertension Mother    Muscular dystrophy Son        Tobacco Use    Smoking status: Former Smoker     Packs/day: 1.50     Years: 30.00     Pack years: 45.00     Types: Cigarettes     Last attempt to quit: 1990     Years since quittin.2    Smokeless tobacco: Former User   Substance and Sexual Activity    Alcohol use: No     Alcohol/week: 0.0 oz    Drug use: No    Sexual activity: Never     Review of Systems   Constitutional: Negative for chills, diaphoresis, fatigue and fever.   HENT: Negative for congestion, sore throat and voice change.    Eyes: Negative for photophobia and visual disturbance.   Respiratory: Negative for cough, shortness of breath, wheezing and stridor.    Cardiovascular: Negative for chest pain and leg swelling.   Gastrointestinal: Negative for abdominal distention, abdominal pain, constipation, diarrhea, nausea and vomiting.   Endocrine: Negative for polydipsia, polyphagia and polyuria.   Genitourinary: Negative for difficulty urinating, dysuria, flank pain,  pelvic pain, urgency and vaginal discharge.   Musculoskeletal: Negative for back pain, joint swelling, neck pain and neck stiffness.   Skin: Negative for color change and rash.   Allergic/Immunologic: Negative for immunocompromised state.   Neurological: Positive for headaches. Negative for dizziness, syncope, weakness and numbness.   Hematological: Does not bruise/bleed easily.   Psychiatric/Behavioral: Negative for agitation, behavioral problems and confusion.     Objective:     Vital Signs (Most Recent):  Temp: 98.4 °F (36.9 °C) (03/26/19 2053)  Pulse: 63 (03/26/19 2053)  Resp: 18 (03/26/19 2053)  BP: (!) 160/73 (03/26/19 2053)  SpO2: 99 % (03/26/19 2053) Vital Signs (24h Range):  Temp:  [97.9 °F (36.6 °C)-98.4 °F (36.9 °C)] 98.4 °F (36.9 °C)  Pulse:  [63-79] 63  Resp:  [17-25] 18  SpO2:  [98 %-100 %] 99 %  BP: (116-228)/(59-97) 160/73     Weight: 66.7 kg (147 lb 0.8 oz)  Body mass index is 24.47 kg/m².    Physical Exam   Constitutional: She is oriented to person, place, and time. She appears well-developed and well-nourished. No distress.   HENT:   Head: Normocephalic and atraumatic.   Nose: Nose normal.   Eyes: Pupils are equal, round, and reactive to light. Conjunctivae and EOM are normal. No scleral icterus.   Neck: Normal range of motion. Neck supple. No tracheal deviation present.   Cardiovascular: Normal rate, regular rhythm, normal heart sounds and intact distal pulses.   No murmur heard.  Pulmonary/Chest: Effort normal and breath sounds normal. No stridor. No respiratory distress. She has no wheezes. She has no rales.   Abdominal: Soft. Bowel sounds are normal. She exhibits no distension. There is no tenderness. There is no guarding.   Musculoskeletal: Normal range of motion. She exhibits no edema or deformity.   Neurological: She is alert and oriented to person, place, and time. No cranial nerve deficit.   Skin: Skin is warm and dry. Capillary refill takes less than 2 seconds. No rash noted. She is not  diaphoretic.   Psychiatric: She has a normal mood and affect. Her behavior is normal. Judgment and thought content normal.   Nursing note and vitals reviewed.        CRANIAL NERVES     CN III, IV, VI   Pupils are equal, round, and reactive to light.  Extraocular motions are normal.        Significant Labs: All pertinent labs within the past 24 hours have been reviewed.  Results for orders placed or performed during the hospital encounter of 03/26/19   CBC auto differential   Result Value Ref Range    WBC 6.87 3.90 - 12.70 K/uL    RBC 3.17 (L) 4.00 - 5.40 M/uL    Hemoglobin 9.6 (L) 12.0 - 16.0 g/dL    Hematocrit 29.6 (L) 37.0 - 48.5 %    MCV 93 82 - 98 fL    MCH 30.3 27.0 - 31.0 pg    MCHC 32.4 32.0 - 36.0 g/dL    RDW 13.2 11.5 - 14.5 %    Platelets 186 150 - 350 K/uL    MPV 9.4 9.2 - 12.9 fL    Gran # (ANC) 5.3 1.8 - 7.7 K/uL    Lymph # 1.1 1.0 - 4.8 K/uL    Mono # 0.4 0.3 - 1.0 K/uL    Eos # 0.1 0.0 - 0.5 K/uL    Baso # 0.01 0.00 - 0.20 K/uL    Gran% 77.4 (H) 38.0 - 73.0 %    Lymph% 15.9 (L) 18.0 - 48.0 %    Mono% 5.7 4.0 - 15.0 %    Eosinophil% 0.9 0.0 - 8.0 %    Basophil% 0.1 0.0 - 1.9 %    Differential Method Automated    Comprehensive metabolic panel   Result Value Ref Range    Sodium 144 136 - 145 mmol/L    Potassium 3.7 3.5 - 5.1 mmol/L    Chloride 107 95 - 110 mmol/L    CO2 25 23 - 29 mmol/L    Glucose 59 (L) 70 - 110 mg/dL    BUN, Bld 61 (H) 8 - 23 mg/dL    Creatinine 3.3 (H) 0.5 - 1.4 mg/dL    Calcium 9.3 8.7 - 10.5 mg/dL    Total Protein 6.6 6.0 - 8.4 g/dL    Albumin 3.8 3.5 - 5.2 g/dL    Total Bilirubin 0.3 0.1 - 1.0 mg/dL    Alkaline Phosphatase 92 55 - 135 U/L    AST 18 10 - 40 U/L    ALT 28 10 - 44 U/L    Anion Gap 12 8 - 16 mmol/L    eGFR if African American 15 (A) >60 mL/min/1.73 m^2    eGFR if non African American 13 (A) >60 mL/min/1.73 m^2   Urinalysis, Reflex to Urine Culture Urine, Clean Catch   Result Value Ref Range    Specimen UA Urine, Clean Catch     Color, UA Yellow Yellow, Straw, Hanane     Appearance, UA Clear Clear    pH, UA 7.0 5.0 - 8.0    Specific Gravity, UA 1.010 1.005 - 1.030    Protein, UA 2+ (A) Negative    Glucose, UA Negative Negative    Ketones, UA Negative Negative    Bilirubin (UA) Negative Negative    Occult Blood UA Negative Negative    Nitrite, UA Negative Negative    Urobilinogen, UA Negative <2.0 EU/dL    Leukocytes, UA Negative Negative   Brain natriuretic peptide   Result Value Ref Range     (H) 0 - 99 pg/mL   CK   Result Value Ref Range     (H) 20 - 180 U/L   Troponin I   Result Value Ref Range    Troponin I 0.083 (H) 0.000 - 0.026 ng/mL   Urinalysis Microscopic   Result Value Ref Range    RBC, UA 3 0 - 4 /hpf    WBC, UA 0 0 - 5 /hpf    Bacteria, UA None None-Occ /hpf    Squam Epithel, UA 0 /hpf    Hyaline Casts, UA 0 0-1/lpf /lpf    Microscopic Comment SEE COMMENT    Phosphorus   Result Value Ref Range    Phosphorus 3.5 2.7 - 4.5 mg/dL   Magnesium   Result Value Ref Range    Magnesium 1.8 1.6 - 2.6 mg/dL   POCT glucose   Result Value Ref Range    POCT Glucose 149 (H) 70 - 110 mg/dL       Significant Imaging: I have reviewed all pertinent imaging results/findings within the past 24 hours.   Imaging Results          X-Ray Chest AP Portable (Final result)  Result time 03/26/19 18:15:47    Final result by Pedro Montoya III, MD (03/26/19 18:15:47)                 Impression:      No acute abnormality identified in the chest.      Electronically signed by: Pedro Montoya MD  Date:    03/26/2019  Time:    18:15             Narrative:    EXAMINATION:  XR CHEST AP PORTABLE    CLINICAL HISTORY:  Hypertension;    COMPARISON:  12/10/2018    FINDINGS:  Heart size is upper limits of normal, improved since prior exam.  The lungs appear clear of active disease. No acute appearing infiltrate, pleural effusion or pneumothorax identified.                                  I have personally reviewed the patients labs, imaging, ekg   (SR) and discussed the patient case in detail with  the Er provider      Assessment/Plan:     * Hypertensive urgency    Blood pressure improved.  Continue patient's home medication therapy and monitor trends consider adjusting pending course.    Trend troponin.   in past patient's renal function has been in upper limits of  Creatinine 4.5 and above with review of patient's past medical records.  Patient's last renal ultrasound in 2015 normal renal study.  Pending course consider repeat renal study versus outpatient follow-up.  Patient's creatinine today 3.3.    Consider Cardiology consult pending course.  Further evaluation/diagnostics/interventions/consults pending course         Other headache syndrome   Symptoms improve with reduction of blood pressure.  Suspect related to hypertension.  Patient's neuro exam benign no acute/focal deficits.   patient has been having fluctuating blood pressures and has been being monitored outpatient for this consider any medication adjustments especially with patient's isosorbide as cause to her headache if nitrite induced.    Monitor closely and Further evaluation/diagnostics/interventions/consults pending course         Chronic systolic congestive heart failure   Continue ASA, Plavix,  Statin, BB, Diuretic, BP therapy.   no Ace or Arb therapy noted consider pending course.    Optimization pending course.    Consider consult Cardiology  Further evaluation/diagnostics/interventions/consults pending course        Controlled type 2 diabetes mellitus with diabetic polyneuropathy, with long-term current use of insulin     Risk reduction: Check A1c, lipid panel, TSH,.    Sliding scale insulin.   diabetic cardiac diet    Monitor  Further evaluation/diagnostics/interventions/consults pending course     CKD  Stable  Is not on ACE/ARB per patient medicaiton profile, consider in daily therapy pending course.   montior BMP  Consider consult to renal pending course  Consider Renal Ultrasound  Further  evaluation/diagnostics/interventions/consults pending course       VTE Risk Mitigation (From admission, onward)        Ordered     heparin (porcine) injection 5,000 Units  Every 8 hours      03/26/19 1914     Place DYANA hose  Until discontinued      03/26/19 1914     Place sequential compression device  Until discontinued      03/26/19 1914     IP VTE HIGH RISK PATIENT  Once      03/26/19 1914     Place sequential compression device  Until discontinued      03/26/19 1909         **Portions of this note has been dictated using speech recognition software, M*Modal Fluency Direct; although, time has been taken to proof read and revise it may still contain misspellings, grammatical and or other errors.*               Sebastien Townsend NP  Department of Hospital Medicine   [facility]

## 2019-03-27 NOTE — ED NOTES
Pt. Resting comfortably in bed, visitor at bed side. Pt. Denies CP, SOB.  Pt. Provided warm blankets per request, no other needs at this time.

## 2019-03-27 NOTE — CONSULTS
[facility]  Cardiology  Consult Note    Patient Name: Avril Monzon  MRN: [id number]  Admission Date: 3/26/2019  Hospital Length of Stay: 0 days  Code Status: Full Code   Attending Provider: Ester Magana MD   Consulting Provider: Evie Guerrero NP  Primary Care Physician: Rose Parks MD  Principal Problem:Hypertensive urgency    Patient information was obtained from patient, relative(s), past medical records and ER records.     Inpatient consult to Cardiology  Consult performed by: Evie Guerrero NP  Consult ordered by: Michoacano Nicholas NP        Subjective:     Chief Complaint:  Elevated BP, Headache     HPI:   Mrs. Monzon is a 74 year old female who presented to [facility] due to elevated blood pressure and headache. Her current medical conditions include HTN, HLP, CAD s/p recent coronary stenting per Dr. Holman, DM Type II. ED workup revealed /94, H/H 9.6/29.6, K+ 3.7, Cr 3.3, Troponin 0.083. She was placed in observation under the care of hospital medicine. Cardiology consulted to assist with medical management. Patient seen and examined in room, lying in bed. Denies chest pain or anginal equivalents. No shortness of breath, Murillo or palpitations. No orthopnea, PND or abdominal bloating. Denies leg swelling or claudications today. Reports compliance with medications at home PTA but does endorse recent dietary indiscretions. Further recs to follow.     Past Medical History:   Diagnosis Date    Acute hypoxemic respiratory failure 11/26/2018    Anemia     Arthritis     back, hand, knees    Back pain     Cataract     CKD stage G4/A3, GFR 15 - 29 and albumin creatinine ratio >300 mg/g     GFR 40% Jan 2014 and 33% ub 3/2014 ([facility])    Coronary artery disease involving native coronary artery of native heart 11/30/2018    Diabetic retinopathy     DM (diabetes mellitus) type II controlled, neurological manifestation     Exudative age-related macular degeneration of left eye with active  choroidal neovascularization 2/5/2019    GERD (gastroesophageal reflux disease)     Glaucoma     Heart failure     Hyperlipidemia     Hypertension     NSTEMI (non-ST elevated myocardial infarction) 11/27/2018    Peripheral neuropathy     Polyneuropathy     Proteinuria     >6 mo     Seizures     [facility] 1/2014 -dick CT NRI showed small vessel    Stroke 2012,2014    Tobacco dependence     Type 2 diabetes with peripheral circulatory disorder, controlled        Past Surgical History:   Procedure Laterality Date    BREAST LUMPECTOMY Left     benign, when patient was 15 years old    BREAST MASS EXCISION      ,benign, age 15.    CATHETERIZATION, HEART, BOTH LEFT AND RIGHT N/A 11/30/2018    [facility]formed [facility] C[facility]    CATHETERIZATION, HEART, BOTH LEFT AND RIGHT N/A 11/28/2018    Performed by [facility] at [facility] CATH LAB    CATHETERIZATION, HEART, LEFT Left 12/3/2018    Performed by [facility] at [facility] CATH LAB    HYSTERECTOMY  1982    OOPHORECTOMY      wrist cyst Right 1980s    dorsal wrist cyst       Review of patient's allergies indicates:   Allergen Reactions    Codeine Swelling    Darvon [propoxyphene] Swelling       No current facility-administered medications on file prior to encounter.      Current Outpatient Medications on File Prior to Encounter   Medication Sig    aspirin (ECOTRIN) 81 MG EC tablet Take 1 tablet (81 mg total) by mouth once daily.    atorvastatin (LIPITOR) 80 MG tablet Take 1 tablet (80 mg total) by mouth once daily.    blood sugar diagnostic Strp 1 strip by Misc.(Non-Drug; Combo Route) route 3 (three) times daily. relion ultima    calcifediol (RAYALDEE) 30 mcg Cs24 Take one ccapsule by mouth once daily.    clopidogrel (PLAVIX) 75 mg tablet Take 1 tablet (75 mg total) by mouth once daily.    insulin NPH-insulin regular, 70/30, (NOVOLIN 70/30) 100 unit/mL (70-30) injection INJECT SUBCUTANEOUSLY 30 UNITS IN THE MORNING AND INJECT 25 UNITS  IN THE EVENING (Patient taking differently: INJECT SUBCUTANEOUSLY 30 UNITS IN THE MORNING AND INJECT 25 UNITS IN THE EVENING)    isosorbide-hydrALAZINE 20-37.5 mg (BIDIL) 20-37.5 mg Tab Take 1 tablet by mouth 3 (three) times daily.    lancets (ACCU-CHEK SOFTCLIX LANCETS) Misc 1 lancet by Misc.(Non-Drug; Combo Route) route 3 (three) times daily.    metoprolol tartrate (LOPRESSOR) 25 MG tablet Take 1 tablet (25 mg total) by mouth 2 (two) times daily.    nitroGLYCERIN (NITROSTAT) 0.4 MG SL tablet Place 1 tablet (0.4 mg total) under the tongue every 5 (five) minutes as needed for Chest pain.    polymyxin B sulf-trimethoprim (POLYTRIM) 10,000 unit- 1 mg/mL Drop Place 1 drop into the left eye 4 (four) times daily. for 4 days    sodium bicarbonate 325 MG tablet TAKE ONE TABLET BY MOUTH THREE TIMES DAILY    torsemide (DEMADEX) 20 MG Tab Take 1 tablet (20 mg total) by mouth once daily.     Family History     Problem Relation (Age of Onset)    Cancer Maternal Grandmother    Diabetes Mother    Heart disease Mother    Hyperlipidemia Mother    Hypertension Mother    Muscular dystrophy Son        Tobacco Use    Smoking status: Former Smoker     Packs/day: 1.50     Years: 30.00     Pack years: 45.00     Types: Cigarettes     Last attempt to quit: 1990     Years since quittin.2    Smokeless tobacco: Former User   Substance and Sexual Activity    Alcohol use: No     Alcohol/week: 0.0 oz    Drug use: No    Sexual activity: Never     Review of Systems   Constitution: Negative for malaise/fatigue.   HENT: Negative for hearing loss and hoarse voice.    Eyes: Negative for blurred vision and visual disturbance.   Cardiovascular: Negative for chest pain, claudication, dyspnea on exertion, irregular heartbeat, leg swelling, near-syncope, orthopnea, palpitations, paroxysmal nocturnal dyspnea and syncope.        +HTN   Respiratory: Negative for cough, hemoptysis, shortness of breath, sleep disturbances due to breathing,  snoring and wheezing.    Endocrine: Negative for cold intolerance and heat intolerance.   Hematologic/Lymphatic: Bruises/bleeds easily.   Skin: Negative for color change, dry skin and nail changes.   Musculoskeletal: Positive for arthritis and back pain. Negative for joint pain and myalgias.   Gastrointestinal: Negative for bloating, abdominal pain, constipation, nausea and vomiting.   Genitourinary: Negative for dysuria, flank pain, hematuria and hesitancy.   Neurological: Positive for headaches. Negative for light-headedness, loss of balance, numbness, paresthesias and weakness.   Psychiatric/Behavioral: Negative for altered mental status.   Allergic/Immunologic: Negative for environmental allergies.     Objective:     Vital Signs (Most Recent):  Temp: 98.7 °F (37.1 °C) (03/27/19 1127)  Pulse: 66 (03/27/19 1127)  Resp: 16 (03/27/19 1127)  BP: 123/60 (03/27/19 1127)  SpO2: 98 % (03/27/19 1127) Vital Signs (24h Range):  Temp:  [97.8 °F (36.6 °C)-98.7 °F (37.1 °C)] 98.7 °F (37.1 °C)  Pulse:  [58-79] 66  Resp:  [16-25] 16  SpO2:  [97 %-100 %] 98 %  BP: (116-228)/(59-97) 123/60     Weight: 67.9 kg (149 lb 11.1 oz)  Body mass index is 24.91 kg/m².    SpO2: 98 %  O2 Device (Oxygen Therapy): room air      Intake/Output Summary (Last 24 hours) at 3/27/2019 1537  Last data filed at 3/27/2019 1300  Gross per 24 hour   Intake 840 ml   Output 800 ml   Net 40 ml       Lines/Drains/Airways     Peripheral Intravenous Line                 Peripheral IV - Single Lumen 03/26/19 1800 Right Antecubital less than 1 day                Physical Exam   Constitutional: She is oriented to person, place, and time. She appears well-developed and well-nourished. No distress.   HENT:   Head: Normocephalic and atraumatic.   Neck: Normal range of motion and full passive range of motion without pain. Neck supple. No JVD present.   Cardiovascular: Normal rate, regular rhythm, S1 normal, S2 normal and intact distal pulses. PMI is not displaced. Exam  reveals no distant heart sounds.   Murmur heard.  Pulses:       Radial pulses are 2+ on the right side, and 2+ on the left side.        Dorsalis pedis pulses are 2+ on the right side, and 2+ on the left side.   Pulmonary/Chest: Effort normal and breath sounds normal. No accessory muscle usage. No respiratory distress. She has no decreased breath sounds. She has no wheezes. She has no rales.   Abdominal: Soft. Bowel sounds are normal. She exhibits no distension. There is no tenderness.   Musculoskeletal: Normal range of motion. She exhibits no edema.        Right ankle: She exhibits no swelling.        Left ankle: She exhibits no swelling.   Neurological: She is alert and oriented to person, place, and time.   Skin: Skin is warm and dry. She is not diaphoretic. No cyanosis. Nails show no clubbing.   Psychiatric: She has a normal mood and affect. Her speech is normal and behavior is normal. Judgment and thought content normal. Cognition and memory are normal.   Nursing note and vitals reviewed.      Significant Labs:   BMP:   Recent Labs   Lab 03/26/19  1800 03/27/19  0505   GLU 59* 57*    145   K 3.7 3.8    110   CO2 25 26   BUN 61* 61*   CREATININE 3.3* 3.1*   CALCIUM 9.3 8.8   MG 1.8  --    , CBC   Recent Labs   Lab 03/26/19  1800 03/27/19  0505   WBC 6.87 4.86   HGB 9.6* 8.5*   HCT 29.6* 26.1*    173   , Lipid Panel   Recent Labs   Lab 03/27/19  0505   CHOL 150   HDL 45   LDLCALC 90.0   TRIG 75   CHOLHDL 30.0   , Troponin   Recent Labs   Lab 03/26/19  2321 03/27/19  0505 03/27/19  1129   TROPONINI 0.137* 0.121* 0.084*    and All pertinent lab results from the last 24 hours have been reviewed.    Significant Imaging: Echocardiogram:   2D echo with color flow doppler:   Results for orders placed or performed during the hospital encounter of 11/26/18   2D echo with color flow doppler   Result Value Ref Range    QEF 45 55 - 65    Mitral Valve Regurgitation MILD TO MODERATE     Diastolic Dysfunction  Yes (A)     Est. PA Systolic Pressure 69.15 (A)     Tricuspid Valve Regurgitation MILD     and X-Ray: CXR: X-Ray Chest 1 View (CXR): No results found for this visit on 03/26/19.    Assessment and Plan:     Chronic systolic congestive heart failure  EF 45% on recent ECHO  Dash diet, 2 gm sodium restriction  1.2L Fluid restriction  Encourage compliance with diet and fluid restrictions  Daily weights  Strict intake and output  Seems compensated on exam today  Continue ASA, Statin, Plavix, BB, Bidil  No ACEi/ARB given renal function  Will need close cardiology follow up after discharge  Clinic will call to arrange follow up next week for BP review    Chronic kidney disease (CKD) stage G4/A3, severely decreased glomerular filtration rate (GFR) between 15-29 mL/min/1.73 square meter and albuminuria creatinine ratio greater than 300 mg/g  Avoid ACEi/ARB given renal function  Monitor closely    Controlled type 2 diabetes mellitus with diabetic polyneuropathy, with long-term current use of insulin  Mgmt per primary team        VTE Risk Mitigation (From admission, onward)        Ordered     heparin (porcine) injection 5,000 Units  Every 8 hours      03/26/19 1914     Place DYANA hose  Until discontinued      03/26/19 1914     Place sequential compression device  Until discontinued      03/26/19 1914     IP VTE HIGH RISK PATIENT  Once      03/26/19 1914     Place sequential compression device  Until discontinued      03/26/19 1909          Thank you for your consult. I will follow-up with patient. Please contact us if you have any additional questions.    Evie Guerrero NP  Cardiology   [facility]

## 2019-03-27 NOTE — ASSESSMENT & PLAN NOTE
Stable  Is not on ACE/ARB per patient medicaiton profile, consider in daily therapy pending course.   montior BMP  Consider consult to renal pending course  Consider Renal Ultrasound  Further evaluation/diagnostics/interventions/consults pending course

## 2019-03-27 NOTE — PLAN OF CARE
Problem: Adult Inpatient Plan of Care  Goal: Plan of Care Review  Outcome: Ongoing (interventions implemented as appropriate)  POC reviewed with patient. Pt verbalized understanding  Pt remains free of injuries and falls; fall precaution in place.   C/o legt cramps in BLE. Moderately controlled by PRN meds and relaxation techniques.   NR to sinus christi on tele monitor. HR 50's-60's.  IV saline locked; intact.  Neuro check q4hr maintained.   No other c/o at this time.  Bed low, side rails x2, call light in reach, personal belongings at bedside.  Reminded to call for assistance.  Repositioned independently.  Hourly rounding complete. Will continue to monitor.

## 2019-03-27 NOTE — HPI
74-year-old female patient with recent history of PCI with stent about 3 months ago on aspirin and Plavix daily presents today with complaint of high blood pressure.  No modifying factors.  Associated symptoms include headache.  Prior treatment includes  Current home blood pressure regimen.   Patient reports she has been compliant with her medication plan, diet, anticoagulation/ antiplatelet therapy and routinely follows up with Cardiology.  Patient was seen in the emergency room was treated for high blood pressure with anti hypertensives with positive response and  Reduction in associated symptom of headache.  Patient's troponin initially in the emergency room 0.08.  Hospital Medicine consulted.  Patient placed in observation for continue monitoring.

## 2019-03-27 NOTE — ASSESSMENT & PLAN NOTE
EF 45% on recent ECHO  Dash diet, 2 gm sodium restriction  1.2L Fluid restriction  Encourage compliance with diet and fluid restrictions  Daily weights  Strict intake and output  Seems compensated on exam today  Continue ASA, Statin, Plavix, BB, Bidil  No ACEi/ARB given renal function  Will need close cardiology follow up after discharge  Clinic will call to arrange follow up next week for BP review

## 2019-03-28 NOTE — HOSPITAL COURSE
Ms Monzon is a 74 year old female with PMHx of CAD, HTN, and CKD who presented to [facility] Emergency Room with complaints of elevated blood pressure, associated with headache. Systolic BP in the 200's in the Emergency Room.  Troponin elevated elevated, probable related to CKD and HTN. Blood pressure now better control. She underwent LHC with stent placement in 11/2018. Cardiology consulted for further evaluation. Pt reports taking all medications as prescribed. She tries to monitor sodium intake, however she is not always successful. Patient educated on the importance of limiting sodium intake to limit HTN. She was instructed to follow up in cardiology clinic within one week as outpatient. Patient seen, examined and deemed suitable for discharge.

## 2019-03-28 NOTE — DISCHARGE SUMMARY
[facility] Hospital Medicine  Discharge Summary      Patient Name: Avril Monzon  MRN: [id number]  Admission Date: 3/26/2019  Hospital Length of Stay: 0 days  Discharge Date and Time: 3/27/2019  6:45 PM  Attending Physician: Ester Magana MD  Discharging Provider: Michoacano Nicholas NP  Primary Care Provider: Rose Parks MD      HPI:   74-year-old female patient with recent history of PCI with stent about 3 months ago on aspirin and Plavix daily presents today with complaint of high blood pressure.  No modifying factors.  Associated symptoms include headache.  Prior treatment includes  Current home blood pressure regimen.   Patient reports she has been compliant with her medication plan, diet, anticoagulation/ antiplatelet therapy and routinely follows up with Cardiology.  Patient was seen in the emergency room was treated for high blood pressure with anti hypertensives with positive response and  Reduction in associated symptom of headache.  Patient's troponin initially in the emergency room 0.08.  Hospital Medicine consulted.  Patient placed in observation for continue monitoring.    * No surgery found *      Hospital Course:   Ms Monzon is a 74 year old female with PMHx of CAD, HTN, and CKD who presented to [facility] Emergency Room with complaints of elevated blood pressure, associated with headache. Systolic BP in the 200's in the Emergency Room.  Troponin elevated elevated, probable related to CKD and HTN. Blood pressure now better control. She underwent LHC with stent placement in 11/2018. Cardiology consulted for further evaluation. Pt reports taking all medications as prescribed. She tries to monitor sodium intake, however she is not always successful. Patient educated on the importance of limiting sodium intake to limit HTN. She was instructed to follow up in cardiology clinic within one week as outpatient. Patient seen, examined and deemed suitable for discharge.     Consults:   Consults  (From admission, onward)        Status Ordering Provider     Inpatient consult to Cardiology  Once     Provider:  Colton Abreu MD    Completed HENRI REMY          Chronic kidney disease (CKD) stage G4/A3, severely decreased glomerular filtration rate (GFR) between 15-29 mL/min/1.73 square meter and albuminuria creatinine ratio greater than 300 mg/g    Stable  Is not on ACE/ARB per patient medicaiton profile, consider in daily therapy pending course.   montior BMP  Consider consult to renal pending course  Consider Renal Ultrasound  Further evaluation/diagnostics/interventions/consults pending course         Final Active Diagnoses:    Diagnosis Date Noted POA    Other headache syndrome [G44.89] 03/26/2019 Yes    Chronic systolic congestive heart failure [I50.22] 12/12/2018 Yes    Chronic kidney disease (CKD) stage G4/A3, severely decreased glomerular filtration rate (GFR) between 15-29 mL/min/1.73 square meter and albuminuria creatinine ratio greater than 300 mg/g [N18.4]  Yes    Controlled type 2 diabetes mellitus with diabetic polyneuropathy, with long-term current use of insulin [E11.42, Z79.4]  Not Applicable     Chronic      Problems Resolved During this Admission:    Diagnosis Date Noted Date Resolved POA    PRINCIPAL PROBLEM:  Hypertensive urgency [I16.0] 03/26/2019 03/27/2019 Yes       Discharged Condition: stable    Disposition: Home or Self Care    Follow Up:  Follow-up Information     Evie Guerrero NP In 1 week.    Specialty:  Cardiology  Why:  hospital follow up   Contact information:  [address] [place] [address] DR Rhonda SOUZA [address] [phone]                 Patient Instructions:      Diet Cardiac   Scheduling Instructions: 2 gm low sodium diet     Notify your health care provider if you experience any of the following:  severe persistent headache     Notify your health care provider if you experience any of the following:  severe uncontrolled pain     Notify your health care provider if you  experience any of the following:  difficulty breathing or increased cough     Activity as tolerated       Significant Diagnostic Studies: Labs:   CMP   Recent Labs   Lab 03/26/19  1800 03/27/19  0505    145   K 3.7 3.8    110   CO2 25 26   GLU 59* 57*   BUN 61* 61*   CREATININE 3.3* 3.1*   CALCIUM 9.3 8.8   PROT 6.6  --    ALBUMIN 3.8  --    BILITOT 0.3  --    ALKPHOS 92  --    AST 18  --    ALT 28  --    ANIONGAP 12 9   ESTGFRAFRICA 15* 17*   EGFRNONAA 13* 14*   , CBC   Recent Labs   Lab 03/26/19  1800 03/27/19  0505   WBC 6.87 4.86   HGB 9.6* 8.5*   HCT 29.6* 26.1*    173    and Troponin   Recent Labs   Lab 03/27/19  1129   TROPONINI 0.084*       Pending Diagnostic Studies:     None         Medications:  Reconciled Home Medications:      Medication List      CHANGE how you take these medications    insulin NPH-insulin regular (70/30) 100 unit/mL (70-30) injection  Commonly known as:  NovoLIN 70/30 U-100 Insulin  INJECT SUBCUTANEOUSLY 30 UNITS IN THE MORNING AND INJECT 25 UNITS IN THE EVENING  What changed:  additional instructions        CONTINUE taking these medications    aspirin 81 MG EC tablet  Commonly known as:  ECOTRIN  Take 1 tablet (81 mg total) by mouth once daily.     atorvastatin 80 MG tablet  Commonly known as:  LIPITOR  Take 1 tablet (80 mg total) by mouth once daily.     blood sugar diagnostic Strp  1 strip by Misc.(Non-Drug; Combo Route) route 3 (three) times daily. relion ultima     calcifediol 30 mcg Cs24  Commonly known as:  RAYALDEE  Take one ccapsule by mouth once daily.     clopidogrel 75 mg tablet  Commonly known as:  PLAVIX  Take 1 tablet (75 mg total) by mouth once daily.     isosorbide-hydrALAZINE 20-37.5 mg 20-37.5 mg Tab  Commonly known as:  BIDIL  Take 1 tablet by mouth 3 (three) times daily.     lancets Misc  Commonly known as:  ACCU-CHEK SOFTCLIX LANCETS  1 lancet by Misc.(Non-Drug; Combo Route) route 3 (three) times daily.     metoprolol tartrate 25 MG  tablet  Commonly known as:  LOPRESSOR  Take 1 tablet (25 mg total) by mouth 2 (two) times daily.     nitroGLYCERIN 0.4 MG SL tablet  Commonly known as:  NITROSTAT  Place 1 tablet (0.4 mg total) under the tongue every 5 (five) minutes as needed for Chest pain.     polymyxin B sulf-trimethoprim 10,000 unit- 1 mg/mL Drop  Commonly known as:  POLYTRIM  Place 1 drop into the left eye 4 (four) times daily. for 4 days     sodium bicarbonate 325 MG tablet  TAKE ONE TABLET BY MOUTH THREE TIMES DAILY     torsemide 20 MG Tab  Commonly known as:  DEMADEX  Take 1 tablet (20 mg total) by mouth once daily.            Indwelling Lines/Drains at time of discharge:   Lines/Drains/Airways          None          Time spent on the discharge of patient: 40 minutes  Patient was seen and examined on the date of discharge and determined to be suitable for discharge.         Michoacano Nicholas NP  Department of Hospital Medicine  [facility]

## 2019-03-29 ENCOUNTER — INFUSION (OUTPATIENT)
Dept: INFUSION THERAPY | Facility: HOSPITAL | Age: 74
End: 2019-03-29
Attending: INTERNAL MEDICINE
Payer: MEDICARE

## 2019-03-29 ENCOUNTER — OFFICE VISIT (OUTPATIENT)
Dept: NEPHROLOGY | Facility: CLINIC | Age: 74
End: 2019-03-29
Payer: MEDICARE

## 2019-03-29 VITALS
BODY MASS INDEX: 24.75 KG/M2 | HEART RATE: 62 BPM | SYSTOLIC BLOOD PRESSURE: 170 MMHG | HEIGHT: 65 IN | DIASTOLIC BLOOD PRESSURE: 70 MMHG | WEIGHT: 148.56 LBS

## 2019-03-29 VITALS
RESPIRATION RATE: 16 BRPM | TEMPERATURE: 97 F | DIASTOLIC BLOOD PRESSURE: 70 MMHG | HEART RATE: 64 BPM | SYSTOLIC BLOOD PRESSURE: 182 MMHG | OXYGEN SATURATION: 98 %

## 2019-03-29 DIAGNOSIS — N25.81 SECONDARY HYPERPARATHYROIDISM OF RENAL ORIGIN: ICD-10-CM

## 2019-03-29 DIAGNOSIS — E11.59 HYPERTENSION ASSOCIATED WITH DIABETES: Chronic | ICD-10-CM

## 2019-03-29 DIAGNOSIS — E11.21 DIABETIC NEPHROPATHY ASSOCIATED WITH TYPE 2 DIABETES MELLITUS: ICD-10-CM

## 2019-03-29 DIAGNOSIS — N18.4 CHRONIC KIDNEY DISEASE (CKD), STAGE IV (SEVERE): Primary | ICD-10-CM

## 2019-03-29 DIAGNOSIS — N18.4 CHRONIC KIDNEY DISEASE (CKD) STAGE G4/A3, SEVERELY DECREASED GLOMERULAR FILTRATION RATE (GFR) BETWEEN 15-29 ML/MIN/1.73 SQUARE METER AND ALBUMINURIA CREATININE RATIO GREATER THAN 300 MG/G: ICD-10-CM

## 2019-03-29 DIAGNOSIS — N18.4 ANEMIA IN STAGE 4 CHRONIC KIDNEY DISEASE: ICD-10-CM

## 2019-03-29 DIAGNOSIS — I15.2 HYPERTENSION ASSOCIATED WITH DIABETES: Chronic | ICD-10-CM

## 2019-03-29 DIAGNOSIS — I50.22 CHRONIC SYSTOLIC CONGESTIVE HEART FAILURE: ICD-10-CM

## 2019-03-29 DIAGNOSIS — D63.1 ANEMIA IN STAGE 4 CHRONIC KIDNEY DISEASE: ICD-10-CM

## 2019-03-29 DIAGNOSIS — D50.0 IRON DEFICIENCY ANEMIA DUE TO CHRONIC BLOOD LOSS: Primary | ICD-10-CM

## 2019-03-29 DIAGNOSIS — R60.0 LOCALIZED EDEMA: ICD-10-CM

## 2019-03-29 PROCEDURE — 3078F PR MOST RECENT DIASTOLIC BLOOD PRESSURE < 80 MM HG: ICD-10-PCS | Mod: CPTII,S$GLB,, | Performed by: INTERNAL MEDICINE

## 2019-03-29 PROCEDURE — 99999 PR PBB SHADOW E&M-EST. PATIENT-LVL IV: ICD-10-PCS | Mod: PBBFAC,,, | Performed by: INTERNAL MEDICINE

## 2019-03-29 PROCEDURE — 3077F PR MOST RECENT SYSTOLIC BLOOD PRESSURE >= 140 MM HG: ICD-10-PCS | Mod: CPTII,S$GLB,, | Performed by: INTERNAL MEDICINE

## 2019-03-29 PROCEDURE — 99999 PR PBB SHADOW E&M-EST. PATIENT-LVL IV: CPT | Mod: PBBFAC,,, | Performed by: INTERNAL MEDICINE

## 2019-03-29 PROCEDURE — 99499 UNLISTED E&M SERVICE: CPT | Mod: S$GLB,,, | Performed by: INTERNAL MEDICINE

## 2019-03-29 PROCEDURE — 3078F DIAST BP <80 MM HG: CPT | Mod: CPTII,S$GLB,, | Performed by: INTERNAL MEDICINE

## 2019-03-29 PROCEDURE — 3045F PR MOST RECENT HEMOGLOBIN A1C LEVEL 7.0-9.0%: ICD-10-PCS | Mod: CPTII,S$GLB,, | Performed by: INTERNAL MEDICINE

## 2019-03-29 PROCEDURE — 25000003 PHARM REV CODE 250: Performed by: INTERNAL MEDICINE

## 2019-03-29 PROCEDURE — 96365 THER/PROPH/DIAG IV INF INIT: CPT

## 2019-03-29 PROCEDURE — 99214 OFFICE O/P EST MOD 30 MIN: CPT | Mod: S$GLB,,, | Performed by: INTERNAL MEDICINE

## 2019-03-29 PROCEDURE — 3045F PR MOST RECENT HEMOGLOBIN A1C LEVEL 7.0-9.0%: CPT | Mod: CPTII,S$GLB,, | Performed by: INTERNAL MEDICINE

## 2019-03-29 PROCEDURE — 99214 PR OFFICE/OUTPT VISIT, EST, LEVL IV, 30-39 MIN: ICD-10-PCS | Mod: S$GLB,,, | Performed by: INTERNAL MEDICINE

## 2019-03-29 PROCEDURE — 1101F PR PT FALLS ASSESS DOC 0-1 FALLS W/OUT INJ PAST YR: ICD-10-PCS | Mod: CPTII,S$GLB,, | Performed by: INTERNAL MEDICINE

## 2019-03-29 PROCEDURE — 63600175 PHARM REV CODE 636 W HCPCS: Mod: JG | Performed by: INTERNAL MEDICINE

## 2019-03-29 PROCEDURE — 3077F SYST BP >= 140 MM HG: CPT | Mod: CPTII,S$GLB,, | Performed by: INTERNAL MEDICINE

## 2019-03-29 PROCEDURE — 1101F PT FALLS ASSESS-DOCD LE1/YR: CPT | Mod: CPTII,S$GLB,, | Performed by: INTERNAL MEDICINE

## 2019-03-29 PROCEDURE — 99499 RISK ADDL DX/OHS AUDIT: ICD-10-PCS | Mod: S$GLB,,, | Performed by: INTERNAL MEDICINE

## 2019-03-29 RX ORDER — CALCITRIOL 0.25 UG/1
0.25 CAPSULE ORAL DAILY
Qty: 30 CAPSULE | Refills: 11 | Status: SHIPPED | OUTPATIENT
Start: 2019-03-29 | End: 2020-04-14

## 2019-03-29 RX ORDER — ISOSORBIDE DINITRATE AND HYDRALAZINE HYDROCHLORIDE 37.5; 2 MG/1; MG/1
2 TABLET ORAL 3 TIMES DAILY
Qty: 180 TABLET | Refills: 11 | Status: SHIPPED | OUTPATIENT
Start: 2019-03-29 | End: 2020-05-15

## 2019-03-29 RX ORDER — TORSEMIDE 20 MG/1
40 TABLET ORAL DAILY
Qty: 180 TABLET | Refills: 3 | Status: SHIPPED | OUTPATIENT
Start: 2019-03-29 | End: 2020-07-06

## 2019-03-29 RX ADMIN — FERRIC CARBOXYMALTOSE INJECTION 750 MG: 50 INJECTION, SOLUTION INTRAVENOUS at 11:03

## 2019-03-29 NOTE — DISCHARGE INSTRUCTIONS
[facility]  [address] [address]  [phone] phone     [phone] fax  Hours of Operation: Monday- Friday 8:00am- 5:00pm  After hours phone  [phone]  Hematology / Oncology Physicians on call      Dr. Doug Bowman    Please call with any concerns regarding your appointment today.

## 2019-03-29 NOTE — PROGRESS NOTES
Subjective:       Patient ID: Avril Monzon is a 74 y.o.   female who presents for follow-up evaluation of CKD stage 5, HTN, Anemia        Rose Parks MD      HPI : Avril Monzon is a pleasant 74-year-old  woman seen in office today in follow-up for above medical problems. I saw her in clinic about a month ago, initially I met her in the hospital in 1/2019  when she was admitted for NSTEMI, she had a left heart catheterization and stent placement.   recent labs reviewed and discussed with the patient, serum creatinine is 3.1 mg/dL, she was admitted briefly in the hospital about 2 days ago for uncontrolled blood pressure, she is currently on torsemide 20 mg daily and vital 1 tablet 3 times a day, blood pressure is improved but still elevated,   decline in renal function reviewed and discussed with the patient.  Recent serum creatinine is 3.1 mg/dL with a GFR of about 17 mL / min.  Chronic kidney disease due to longstanding history of hypertension and diabetes.  Also exposure to IV contrast.  Anemia noted on the recent labs, in the past she was seen Hematology Department, has follow-up appointment.           Past Medical History:   Diagnosis Date    Acute hypoxemic respiratory failure 11/26/2018    Anemia     Arthritis     back, hand, knees    Back pain     Cataract     CKD stage G4/A3, GFR 15 - 29 and albumin creatinine ratio >300 mg/g     GFR 40% Jan 2014 and 33% ub 3/2014 ([facility])    Coronary artery disease involving native coronary artery of native heart 11/30/2018    Diabetic retinopathy     DM (diabetes mellitus) type II controlled, neurological manifestation     Exudative age-related macular degeneration of left eye with active choroidal neovascularization 2/5/2019    GERD (gastroesophageal reflux disease)     Glaucoma     Heart failure     Hyperlipidemia     Hypertension     NSTEMI (non-ST elevated myocardial infarction) 11/27/2018    Peripheral neuropathy      Polyneuropathy     Proteinuria     >6 mo     Seizures     [facility] 1/2014 -dick CT NRI showed small vessel    Stroke 2012,2014    Tobacco dependence     Type 2 diabetes with peripheral circulatory disorder, controlled          Current Outpatient Medications on File Prior to Visit   Medication Sig Dispense Refill    aspirin (ECOTRIN) 81 MG EC tablet Take 1 tablet (81 mg total) by mouth once daily.  0    atorvastatin (LIPITOR) 80 MG tablet Take 1 tablet (80 mg total) by mouth once daily. 30 tablet 11    blood sugar diagnostic Strp 1 strip by Misc.(Non-Drug; Combo Route) route 3 (three) times daily. relion ultima 100 strip 11    calcifediol (RAYALDEE) 30 mcg Cs24 Take one ccapsule by mouth once daily. 90 capsule 3    clopidogrel (PLAVIX) 75 mg tablet Take 1 tablet (75 mg total) by mouth once daily. 90 tablet 3    insulin NPH-insulin regular, 70/30, (NOVOLIN 70/30) 100 unit/mL (70-30) injection INJECT SUBCUTANEOUSLY 30 UNITS IN THE MORNING AND INJECT 25 UNITS IN THE EVENING (Patient taking differently: INJECT SUBCUTANEOUSLY 30 UNITS IN THE MORNING AND INJECT 25 UNITS IN THE EVENING) 2 vial 3    isosorbide-hydrALAZINE 20-37.5 mg (BIDIL) 20-37.5 mg Tab Take 1 tablet by mouth 3 (three) times daily. 90 tablet 11    lancets (ACCU-CHEK SOFTCLIX LANCETS) Misc 1 lancet by Misc.(Non-Drug; Combo Route) route 3 (three) times daily. 100 each 11    metoprolol tartrate (LOPRESSOR) 25 MG tablet Take 1 tablet (25 mg total) by mouth 2 (two) times daily. 180 tablet 3    nitroGLYCERIN (NITROSTAT) 0.4 MG SL tablet Place 1 tablet (0.4 mg total) under the tongue every 5 (five) minutes as needed for Chest pain. 20 tablet 0    polymyxin B sulf-trimethoprim (POLYTRIM) 10,000 unit- 1 mg/mL Drop Place 1 drop into the left eye 4 (four) times daily. for 4 days 10 mL 0    sodium bicarbonate 325 MG tablet TAKE ONE TABLET BY MOUTH THREE TIMES DAILY 90 tablet 11    torsemide (DEMADEX) 20 MG Tab Take 1 tablet (20 mg total) by mouth once  daily. 90 tablet 3     No current facility-administered medications on file prior to visit.          Review of Systems  :    Constitutional: Positive for activity change and fatigue. Negative for appetite change and fever.   HENT: Negative for congestion, facial swelling, sore throat, trouble swallowing and voice change.    Eyes: Negative for redness and visual disturbance.   Respiratory: Negative for apnea, cough, chest tightness, shortness of breath and wheezing.    Cardiovascular: Negative for chest pain, palpitations and leg swelling.   Gastrointestinal: Negative for abdominal distention, abdominal pain, blood in stool, constipation, diarrhea, nausea and vomiting.   Genitourinary: Negative for decreased urine volume, difficulty urinating, dysuria, flank pain, frequency, hematuria, pelvic pain and urgency.   Musculoskeletal: Positive for arthralgias. Negative for back pain, gait problem and joint swelling.   Skin: Negative for color change and rash.   Neurological: Negative for dizziness, syncope, weakness and headaches.   Hematological: Does not bruise/bleed easily.   Psychiatric/Behavioral: Negative for agitation, behavioral problems and confusion. The patient is not nervous/anxious.      Objective:         Vitals:    03/29/19 1014   BP: (!) 170/70   Pulse: 62       Weight 148 lb, previous weight was 145 lb    Physical Exam  :      Constitutional: She is oriented to person, place, and time. She appears well-developed and well-nourished. No distress.   HENT:   Head: Normocephalic and atraumatic.   Mouth/Throat: Oropharynx is clear and moist. No oropharyngeal exudate.   Eyes: Conjunctivae and EOM are normal. Pupils are equal, round, and reactive to light.   Neck: Normal range of motion. Neck supple. No JVD present. Carotid bruit is not present. No tracheal deviation present. No thyroid mass and no thyromegaly present.   Cardiovascular: Normal rate, regular rhythm and intact distal pulses. Exam reveals no gallop  and no friction rub.   Murmur heard.  Pulmonary/Chest: Effort normal and breath sounds normal. No respiratory distress. She has no wheezes. She has no rales. She exhibits no tenderness.   Abdominal: Soft. Bowel sounds are normal. She exhibits no distension, no abdominal bruit, no ascites and no mass. There is no hepatosplenomegaly. There is no tenderness. There is no rebound, no guarding and no CVA tenderness.   Musculoskeletal: She exhibits no edema or tenderness.   Lymphadenopathy:     She has no cervical adenopathy.   Neurological: She is alert and oriented to person, place, and time. She has normal reflexes. She displays normal reflexes. No cranial nerve deficit. She exhibits normal muscle tone. Coordination normal.   Skin: Skin is warm and intact. No rash noted. No erythema. No pallor.   Psychiatric: She has a normal mood and affect. Her behavior is normal. Judgment normal.               Labs:    Lab Results   Component Value Date    CREATININE 3.1 (H) 03/27/2019    BUN 61 (H) 03/27/2019     03/27/2019    K 3.8 03/27/2019     03/27/2019    CO2 26 03/27/2019       Lab Results   Component Value Date    WBC 4.86 03/27/2019    HGB 8.5 (L) 03/27/2019    HCT 26.1 (L) 03/27/2019    MCV 94 03/27/2019     03/27/2019       Lab Results   Component Value Date    .0 (H) 03/15/2019    CALCIUM 8.8 03/27/2019    PHOS 3.5 03/26/2019       Lab Results   Component Value Date    ALBUMIN 3.8 03/26/2019     Lab Results   Component Value Date    HGBA1C 7.0 (H) 03/27/2019            Impression and Plan :  74-year-old  woman seen in office today in follow-up for following medical problems         1. CKD stage 4/5 :  Chronic kidney disease due to longstanding history of hypertension and diabetes, progressive decline in renal function explained to the patient, recent serum creatinine is 3.1 mg/dL with a GFR of about 17 mL/minute, briefly talked about renal replacement therapy, will schedule  patient to attend chronic kidney disease education and options class, will be an ideal candidate for peritoneal dialysis if interested, not sure if she is a candidate for kidney transplant due to cardiac disease and severe pulmonary hypertension, patient is requesting a referral to kidney transplant Department for further evaluation,      2.  Hypertension - BP is elevated, advised patient to increase BiDil to 2 tablets 3 times a day, continue to monitor blood pressures at home,     3.  Congestive heart failure -  peripheral edema noted, discussed salt and fluid restriction, will increase torsemide from 20 mg to 40 mg daily, advised patient to adjust dose depending on her volume status,     Also pulmonary hypertension reported on the recent echocardiogram, fluid management can be a challenge in view of her pulmonary hypertension,     4. Anemia of CKD :    follow-up with Hematology Department, likely will need iron replacement/Procrit injections,     5.  Secondary hyperparathyroidism - patient says Milagros was not approved by insurance company, will discontinue this medication and start her on calcitriol 0.25 mcg daily,     6.  Referral to dietitian to discuss diabetic and low-salt diet     Return to clinic in about 6 weeks, more than 40 min of face-to-face time was spent with the patient discussing labs and plan of care.     Leonard Bryan MD

## 2019-03-29 NOTE — PATIENT INSTRUCTIONS
Avoid NSAID pain medications such as advil, aleve, motrin, ibuprofen, naprosyn, meloxicam, diclofenac, mobic.     Increase Torsemide to 40 mg a day    Fluid restriction about 40 oz a day     Increase BiDil to 2 tabs three times a day    1) goal blood pressure is less than 130/80    2) please bring BP machine at next appt    3) keep a track of daily BPs and bring at next appt    4) bring all meds in a bag at next appt     Please Let me know if SBP less than 110 mmHg

## 2019-03-29 NOTE — PLAN OF CARE
Problem: Anemia  Goal: Anemia Symptom Improvement  Outcome: Ongoing (interventions implemented as appropriate)  Patient reports no signs or symptoms

## 2019-03-29 NOTE — PLAN OF CARE
Problem: Adult Inpatient Plan of Care  Goal: Plan of Care Review  Outcome: Ongoing (interventions implemented as appropriate)  I feel fine, but my blood pressure is still high. I just got out of the hospital.

## 2019-04-05 PROBLEM — I36.1 NON-RHEUMATIC TRICUSPID VALVE INSUFFICIENCY: Status: ACTIVE | Noted: 2019-04-05

## 2019-04-05 PROBLEM — I50.42 CHRONIC COMBINED SYSTOLIC AND DIASTOLIC CONGESTIVE HEART FAILURE: Status: ACTIVE | Noted: 2018-12-12

## 2019-04-05 PROBLEM — I50.30 ACCELERATED HYPERTENSION WITH DIASTOLIC CONGESTIVE HEART FAILURE, NYHA CLASS 3: Status: ACTIVE | Noted: 2019-04-05

## 2019-04-05 PROBLEM — I11.0 ACCELERATED HYPERTENSION WITH DIASTOLIC CONGESTIVE HEART FAILURE, NYHA CLASS 3: Status: ACTIVE | Noted: 2019-04-05

## 2019-04-05 PROBLEM — I34.0 NON-RHEUMATIC MITRAL REGURGITATION: Status: ACTIVE | Noted: 2019-04-05

## 2019-04-12 ENCOUNTER — PROCEDURE VISIT (OUTPATIENT)
Dept: OPHTHALMOLOGY | Facility: CLINIC | Age: 74
End: 2019-04-12
Payer: MEDICARE

## 2019-04-12 DIAGNOSIS — Z79.4 TYPE 2 DIABETES MELLITUS WITH STABLE PROLIFERATIVE RETINOPATHY OF BOTH EYES, WITH LONG-TERM CURRENT USE OF INSULIN: ICD-10-CM

## 2019-04-12 DIAGNOSIS — H35.3221 EXUDATIVE AGE-RELATED MACULAR DEGENERATION OF LEFT EYE WITH ACTIVE CHOROIDAL NEOVASCULARIZATION: Primary | ICD-10-CM

## 2019-04-12 DIAGNOSIS — E11.3553 TYPE 2 DIABETES MELLITUS WITH STABLE PROLIFERATIVE RETINOPATHY OF BOTH EYES, WITH LONG-TERM CURRENT USE OF INSULIN: ICD-10-CM

## 2019-04-12 PROCEDURE — 99499 UNLISTED E&M SERVICE: CPT | Mod: S$GLB,,, | Performed by: OPHTHALMOLOGY

## 2019-04-12 PROCEDURE — 99499 NO LOS: ICD-10-PCS | Mod: S$GLB,,, | Performed by: OPHTHALMOLOGY

## 2019-04-12 PROCEDURE — 67028 INJECTION EYE DRUG: CPT | Mod: LT,S$GLB,, | Performed by: OPHTHALMOLOGY

## 2019-04-12 PROCEDURE — 92134 POSTERIOR SEGMENT OCT RETINA (OCULAR COHERENCE TOMOGRAPHY)-BOTH EYES: ICD-10-PCS | Mod: S$GLB,,, | Performed by: OPHTHALMOLOGY

## 2019-04-12 PROCEDURE — 92134 CPTRZ OPH DX IMG PST SGM RTA: CPT | Mod: S$GLB,,, | Performed by: OPHTHALMOLOGY

## 2019-04-12 PROCEDURE — 67028 PR INJECT INTRAVITREAL PHARMCOLOGIC: ICD-10-PCS | Mod: LT,S$GLB,, | Performed by: OPHTHALMOLOGY

## 2019-04-12 NOTE — PROGRESS NOTES
===============================  04/12/2019   Avril Monzon,   74 y.o. female   Last visit [facility]: :3/15/2019   Last visit eye dept. 3/15/2019  [facility]:  Corrected distance visual acuity was NLP in the right eye and 20/400 in the left eye.   Not recorded         Not recorded         Not recorded        Chief Complaint   Patient presents with    Macular Degeneration     avastin os        HPI     Macular Degeneration      Additional comments: avastin os              Comments     Noncompliant with visits    1.) DM SINCE 1995  PDR  S/P LASER AND INJECTIONS OS(DR BOX AND AT [facility])  2.) NLP OD SINCE BIRTH  3.) DENSE CAT OD / 2+NS OS  4.) Asteroid Hyalosis  5.)iop 42 od 11/5/18  EYLEA OS 1/4/17  AVASTIN OS 3/15/19          Last edited by JADIEL Coello MD on 4/12/2019  2:34 PM. (History)          ________________  4/12/2019  Problem List Items Addressed This Visit        Eye/Vision problems    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, with long-term current use of insulin    Relevant Medications    aflibercept Soln 2 mg (Completed)    Other Relevant Orders    Posterior Segment OCT Retina-Both eyes (Completed)    Prior Authorization Order    Prior Authorization Order    Exudative age-related macular degeneration of left eye with active choroidal neovascularization - Primary        Os srn   Worse [facility]  rec switch to eylea   worse    rtc 1 mo  .    4/12/2019  Diagnoseyle eylea , OS   Follow up: rtc 1 mo        Instructed to call 24/7 for any worsening of vision. Check Both eyes daily. Gave patient my home phone number.      Procedure  Note:   OS}  Eylea (afibercept) 2 mg/0.05 ml Intravitreal Injection    I have explained the Risks, Benefits and Alternatives of the procedure in detail.  The patient voices understanding and all questions have been answered.  The patient agrees to proceed as discussed.  Xylocaine with Epi 2%  subconj bleb  was used for anesthesia.  Topical betadine was used  for antisepsis.  0.05 cc was  injected 3.7 mm from corneal limbus in the inferotemporal quadrant.  Following injection the IOP was less than thirty (<30) by tonopen.  The eye was then thoroughly irrigated with BSS.  Patient tolerated procedure well.  No complications were observed.  The Patient was educated that mild irritation tonight was normal secondary to topical antispsis use.  Pt was advised to call at any time day or night for pain, redness, or any decline in vision. I gave the patient my home number as well as the clinic on call number. Daily visual checks and Amsler grid testing were reviewed.  polytrim Antibiotic Drops to be used 4 times daily for 4 days  JADIEL Coello MD  Procedure ordered: y  Consent: y  Pre auth: y  MAR:y  Opnote: y  Charge capture:y         ===========================
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
## 2019-04-30 ENCOUNTER — OFFICE VISIT (OUTPATIENT)
Dept: GASTROENTEROLOGY | Facility: CLINIC | Age: 74
End: 2019-04-30
Payer: MEDICARE

## 2019-04-30 VITALS
WEIGHT: 145.94 LBS | HEART RATE: 68 BPM | HEIGHT: 65 IN | DIASTOLIC BLOOD PRESSURE: 56 MMHG | SYSTOLIC BLOOD PRESSURE: 124 MMHG | BODY MASS INDEX: 24.32 KG/M2

## 2019-04-30 DIAGNOSIS — Z86.010 HISTORY OF COLON POLYPS: ICD-10-CM

## 2019-04-30 DIAGNOSIS — Z12.11 COLON CANCER SCREENING: ICD-10-CM

## 2019-04-30 DIAGNOSIS — K59.04 CHRONIC IDIOPATHIC CONSTIPATION: Primary | ICD-10-CM

## 2019-04-30 DIAGNOSIS — Z80.0 FAMILY HISTORY OF COLON CANCER: ICD-10-CM

## 2019-04-30 PROCEDURE — 3078F DIAST BP <80 MM HG: CPT | Mod: CPTII,NTX,S$GLB, | Performed by: NURSE PRACTITIONER

## 2019-04-30 PROCEDURE — 99204 PR OFFICE/OUTPT VISIT, NEW, LEVL IV, 45-59 MIN: ICD-10-PCS | Mod: NTX,S$GLB,, | Performed by: NURSE PRACTITIONER

## 2019-04-30 PROCEDURE — 1101F PR PT FALLS ASSESS DOC 0-1 FALLS W/OUT INJ PAST YR: ICD-10-PCS | Mod: CPTII,NTX,S$GLB, | Performed by: NURSE PRACTITIONER

## 2019-04-30 PROCEDURE — 99999 PR PBB SHADOW E&M-EST. PATIENT-LVL III: CPT | Mod: PBBFAC,TXP,, | Performed by: NURSE PRACTITIONER

## 2019-04-30 PROCEDURE — 1101F PT FALLS ASSESS-DOCD LE1/YR: CPT | Mod: CPTII,NTX,S$GLB, | Performed by: NURSE PRACTITIONER

## 2019-04-30 PROCEDURE — 99204 OFFICE O/P NEW MOD 45 MIN: CPT | Mod: NTX,S$GLB,, | Performed by: NURSE PRACTITIONER

## 2019-04-30 PROCEDURE — 99999 PR PBB SHADOW E&M-EST. PATIENT-LVL III: ICD-10-PCS | Mod: PBBFAC,TXP,, | Performed by: NURSE PRACTITIONER

## 2019-04-30 PROCEDURE — 3074F PR MOST RECENT SYSTOLIC BLOOD PRESSURE < 130 MM HG: ICD-10-PCS | Mod: CPTII,NTX,S$GLB, | Performed by: NURSE PRACTITIONER

## 2019-04-30 PROCEDURE — 3078F PR MOST RECENT DIASTOLIC BLOOD PRESSURE < 80 MM HG: ICD-10-PCS | Mod: CPTII,NTX,S$GLB, | Performed by: NURSE PRACTITIONER

## 2019-04-30 PROCEDURE — 3074F SYST BP LT 130 MM HG: CPT | Mod: CPTII,NTX,S$GLB, | Performed by: NURSE PRACTITIONER

## 2019-04-30 NOTE — PROGRESS NOTES
Clinic Consult:  [facility] Consultation Note    Reason for Consult:  The primary encounter diagnosis was Chronic idiopathic constipation. Diagnoses of Colon cancer screening, History of colon polyps, and Family history of colon cancer were also pertinent to this visit.    PCP: oRse Parks   [id number] [facility] / [place] [id number]    HPI:  This is a 74 y.o. female here for evaluation of the above. She is due for screening colonoscopy. She had a stent placed in November 2018. She is currently on Plavix. Her last colonoscopy was done > 10 years ago. She reports 2 polyps at that time. She also reports a family history of colon cancer in her maternal grandmother. She has chronic constipation. Takes occasional dulcolax but does not feel well controlled. No further GI complaints. No abdominal pain, hematochezia, melena, nausea, vomiting, or weight loss.      Review of Systems   Constitutional: Negative for fever, malaise/fatigue and weight loss.   HENT: Negative for sore throat.    Respiratory: Negative for cough and wheezing.    Cardiovascular: Negative for chest pain and palpitations.   Gastrointestinal: Negative for abdominal pain, blood in stool, constipation, diarrhea, heartburn, melena, nausea and vomiting.   Genitourinary: Negative for dysuria and frequency.   Musculoskeletal: Negative for back pain, joint pain, myalgias and neck pain.   Skin: Negative for itching and rash.   Neurological: Negative for dizziness, speech change, seizures, loss of consciousness and headaches.   Psychiatric/Behavioral: Negative for depression and substance abuse. The patient is not nervous/anxious.        Medical History:  has a past medical history of Acute hypoxemic respiratory failure (11/26/2018), Anemia, Arthritis, Back pain, Cataract, CKD stage G4/A3, GFR 15 - 29 and albumin creatinine ratio >300 mg/g, Coronary artery disease involving native coronary artery of native heart (11/30/2018), Diabetic  retinopathy, DM (diabetes mellitus) type II controlled, neurological manifestation, Exudative age-related macular degeneration of left eye with active choroidal neovascularization (2/5/2019), GERD (gastroesophageal reflux disease), Glaucoma, Heart failure, Hyperlipidemia, Hypertension, NSTEMI (non-ST elevated myocardial infarction) (11/27/2018), Peripheral neuropathy, Polyneuropathy, Proteinuria, Seizures, Stroke (2012,2014), Tobacco dependence, and Type 2 diabetes with peripheral circulatory disorder, controlled.    Surgical History:  has a past surgical history that includes Breast mass excision; wrist cyst (Right, 1980s); Hysterectomy (1982); Catheterization of both left and right heart (N/A, 11/28/2018); Catheterization of both left and right heart (N/A, 11/30/2018); Breast lumpectomy (Left); Oophorectomy; and Left heart catheterization (Left, 12/3/2018).    Family History: family history includes Cancer in her maternal grandmother; Diabetes in her mother; Heart disease in her mother; Hyperlipidemia in her mother; Hypertension in her mother; Muscular dystrophy in her son..     Social History:  reports that she quit smoking about 29 years ago. Her smoking use included cigarettes. She has a 45.00 pack-year smoking history. She has quit using smokeless tobacco. She reports that she does not drink alcohol or use drugs.    Allergies: Reviewed    Home Medications:   Current Outpatient Medications on File Prior to Visit   Medication Sig Dispense Refill    aspirin (ECOTRIN) 81 MG EC tablet Take 1 tablet (81 mg total) by mouth once daily.  0    atorvastatin (LIPITOR) 80 MG tablet Take 1 tablet (80 mg total) by mouth once daily. 30 tablet 11    blood sugar diagnostic Strp 1 strip by Misc.(Non-Drug; Combo Route) route 3 (three) times daily. relion ultima 100 strip 11    calcitRIOL (ROCALTROL) 0.25 MCG Cap Take 1 capsule (0.25 mcg total) by mouth once daily. 30 capsule 11    clopidogrel (PLAVIX) 75 mg tablet Take 1  "tablet (75 mg total) by mouth once daily. 90 tablet 3    insulin NPH-insulin regular, 70/30, (NOVOLIN 70/30) 100 unit/mL (70-30) injection INJECT SUBCUTANEOUSLY 30 UNITS IN THE MORNING AND INJECT 25 UNITS IN THE EVENING (Patient taking differently: INJECT SUBCUTANEOUSLY 30 UNITS IN THE MORNING AND INJECT 25 UNITS IN THE EVENING) 2 vial 3    isosorbide-hydrALAZINE 20-37.5 mg (BIDIL) 20-37.5 mg Tab Take 2 tablets by mouth 3 (three) times daily. 180 tablet 11    lancets (ACCU-CHEK SOFTCLIX LANCETS) Misc 1 lancet by Misc.(Non-Drug; Combo Route) route 3 (three) times daily. 100 each 11    metoprolol tartrate (LOPRESSOR) 25 MG tablet Take 1 tablet (25 mg total) by mouth 2 (two) times daily. 180 tablet 3    nitroGLYCERIN (NITROSTAT) 0.4 MG SL tablet Place 1 tablet (0.4 mg total) under the tongue every 5 (five) minutes as needed for Chest pain. 20 tablet 0    sodium bicarbonate 325 MG tablet TAKE ONE TABLET BY MOUTH THREE TIMES DAILY 90 tablet 11    torsemide (DEMADEX) 20 MG Tab Take 2 tablets (40 mg total) by mouth once daily. 180 tablet 3     No current facility-administered medications on file prior to visit.        Physical Exam:  BP (!) 124/56   Pulse 68   Ht 5' 5" (1.651 m)   Wt 66.2 kg (145 lb 15.1 oz)   LMP  (LMP Unknown)   BMI 24.29 kg/m²   Body mass index is 24.29 kg/m².  Physical Exam   Constitutional: She is oriented to person, place, and time and well-developed, well-nourished, and in no distress. No distress.   HENT:   Head: Normocephalic.   Eyes: Pupils are equal, round, and reactive to light. Conjunctivae are normal.   Cardiovascular: Normal rate, regular rhythm and normal heart sounds.   Pulmonary/Chest: Effort normal and breath sounds normal. No respiratory distress.   Abdominal: Soft. Bowel sounds are normal. She exhibits no distension. There is no tenderness.   Neurological: She is alert and oriented to person, place, and time. No cranial nerve deficit.   Skin: Skin is warm and dry. No rash " noted.   Psychiatric: Mood and affect normal.       Labs: Pertinent labs reviewed.    Assessment:  1. Chronic idiopathic constipation    2. Colon cancer screening    3. History of colon polyps    4. Family history of colon cancer         Recommendations:  Chronic idiopathic constipation  - recommended she start daily Miralax or Colace to control constipation    Colon cancer screening  History of colon polyps  Family history of colon cancer  - she does need a colonoscopy but cardiology will not likely clear her to hold Plavix for her colonoscopy until 1 year post stent placement.   - she will reach out to me in about 6 months to schedule her colonoscopy and to get approval to hold plavix  - she does not need an additional appointment.     Follow up in about 6 months (around 10/30/2019).    Thank you so much for allowing me to participate in the care of JACK Soto

## 2019-05-06 ENCOUNTER — CLINICAL SUPPORT (OUTPATIENT)
Dept: DIABETES | Facility: CLINIC | Age: 74
End: 2019-05-06
Payer: MEDICARE

## 2019-05-06 VITALS — BODY MASS INDEX: 23.95 KG/M2 | HEIGHT: 65 IN | WEIGHT: 143.75 LBS

## 2019-05-06 DIAGNOSIS — I50.22 CHRONIC SYSTOLIC HEART FAILURE: ICD-10-CM

## 2019-05-06 DIAGNOSIS — E11.21 DIABETIC GLOMERULOPATHY: Primary | ICD-10-CM

## 2019-05-06 PROBLEM — H35.3223 EXUDATIVE AGE-RELATED MACULAR DEGENERATION OF LEFT EYE WITH INACTIVE SCAR: Status: ACTIVE | Noted: 2019-02-05

## 2019-05-06 PROBLEM — H25.12 AGE-RELATED NUCLEAR CATARACT OF LEFT EYE: Status: ACTIVE | Noted: 2019-05-06

## 2019-05-06 PROCEDURE — 99999 PR PBB SHADOW E&M-EST. PATIENT-LVL III: CPT | Mod: PBBFAC,,, | Performed by: DIETITIAN, REGISTERED

## 2019-05-06 PROCEDURE — 99999 PR PBB SHADOW E&M-EST. PATIENT-LVL III: ICD-10-PCS | Mod: PBBFAC,,, | Performed by: DIETITIAN, REGISTERED

## 2019-05-06 PROCEDURE — G0108 DIAB MANAGE TRN  PER INDIV: HCPCS | Mod: S$GLB,,, | Performed by: DIETITIAN, REGISTERED

## 2019-05-06 PROCEDURE — G0108 PR DIAB MANAGE TRN  PER INDIV: ICD-10-PCS | Mod: S$GLB,,, | Performed by: DIETITIAN, REGISTERED

## 2019-05-06 NOTE — PROGRESS NOTES
Diabetes Education  Author: Conchita Garcia RD, CDE  Date: 5/6/2019    Diabetes Care Management Summary  Diabetes Education Record Assessment/Progress: Initial  Current Diabetes Risk Level: Moderate     Last A1c:   Lab Results   Component Value Date    HGBA1C 7.0 (H) 03/27/2019     Last visit with Diabetes Educator: Last Education Visit: Not Found    Diabetes Type  Diabetes Type : Type II(Noted MI 11/18)    Diabetes History  Diabetes Diagnosis: >10 years(>30 yrs)  Current Treatment: Exercise, Insulin, Diet(NPH 30units daily in am (pt reports some missed doses))  Reviewed Problem List with Patient: Yes    Health Maintenance was reviewed today with patient. Discussed with patient importance of routine eye exams, foot exams/foot care, blood work (i.e.: A1c, microalbumin, and lipid), dental visits, yearly flu vaccine, and pneumonia vaccine as indicated by PCP. Patient verbalized understanding.     Health Maintenance Topics with due status: Not Due       Topic Last Completion Date    TETANUS VACCINE 06/25/2012    Influenza Vaccine 11/05/2018    Mammogram 12/12/2018    Eye Exam 02/05/2019    Lipid Panel 03/27/2019    Hemoglobin A1c 03/27/2019    High Dose Statin 05/06/2019     Health Maintenance Due   Topic Date Due    DEXA SCAN  02/04/1987    Colonoscopy  02/04/1997    Foot Exam  06/12/2019     Nutrition  Meal Planning: (Intake ~5493-6235 cals/d. Pt working to reduce carb from starch portions. Since MI in Nov, pt is limiting fat and sodium. Likes variety nonstarchy vegetables. Uses Ms Dash, herbs. No excess potassium, protein or phosphorus sources. )  Meal Plan 24 Hour Recall - Breakfast: eggs, grits (stopped israel, sausage)   Meal Plan 24 Hour Recall - Lunch: none OR fruit OR sandwich (tuna or chix salad), occs fruit  Meal Plan 24 Hour Recall - Dinner: steak, gravy/rice spinach w/ boiled, roll w/ butter  Meal Plan 24 Hour Recall - Snack: water, diet cola, sprite zero     Monitoring   Self Monitoring : Not testing  daily, reports fst BG  range, occs higher; meter unavail. She reports hypoglycemia few times per mos related to meal delay, BG unknown, which she treats using regu cola or fruit or candy bar  Blood Glucose Logs: No    Exercise   Exercise Type: (none structured)    Current Diabetes Treatment   Current Treatment: Exercise, Insulin, Diet(NPH 30units daily in am (pt reports some missed doses))    Social History  Preferred Learning Method: Face to Face  Primary Support: Self  Smoking Status: Ex Smoker  Alcohol Use: Never    PHQ-2 Total Score: 0       DDS-2 Score  ( > 3 = SIGNIFICANT DISTRESS): 1       Barriers to Change  Barriers to Change: None  Learning Challenges : Vision  Vision - further explanation: visually impaired    Readiness to Learn   Readiness to Learn : Eager    Cultural Influences  Cultural Influences: No    Diabetes Education Assessment/Progress  Diabetes Disease Process (diabetes disease process and treatment options): Discussion, Individual Session, Demonstrates Understanding/Competency(verbalizes/demonstrates), Written Materials Provided  Nutrition (Incorporating nutritional management into one's lifestyle): Discussion, Individual Session, Demonstrates Understanding/Competency (verbalizes/demonstrates), Written Materials Provided  Physical Activity (incorporating physical activity into one's lifestyle): Discussion, Individual Session, Demonstrates Understanding/Competency (verbalizes/demonstrates), Written Materials Provided  Medications (states correct name, dose, onset, peak, duration, side effects & timing of meds): Discussion, Individual Session, Demonstrates Understanding/Competency(verbalizes/demonstrates), Written Materials Provided  Monitoring (monitoring blood glucose/other parameters & using results): Discussion, Individual Session, Demonstrates Understanding/Competency (verbalizes/demonstrates), Written Materials Provided  Acute Complications (preventing, detecting, and treating acute  complications): Discussion, Individual Session, Demonstrates Understanding/Competency (verbalizes/demonstrates), Written Materials Provided  Chronic Complications (preventing, detecting, and treating chronic complications): Discussion, Individual Session, Demonstrates Understanding/Competency (verbalizes/demonstrates), Written Materials Provided  Clinical (diabetes, other pertinent medical history, and relevant comorbidities reviewed during visit): Discussion, Individual Session, Demonstrates Understanding/Competency (verbalizes/demonstrates)  Cognitive (knowledge of self-management skills, functional health literacy): Discussion, Individual Session, Demonstrates Understanding/Competency (verbalizes/demonstrates)  Psychosocial (emotional response to diabetes): Discussion, Individual Session, Demonstrates Understanding/Competency (verbalizes/demonstrates)  Diabetes Distress and Support Systems: Discussion, Individual Session, Demonstrates Understanding/Competency (verbalizes/demonstrates)  Behavioral (readiness for change, lifestyle practices, self-care behaviors): Discussion, Individual Session, Demonstrates Understanding/Competency (verbalizes/demonstrates)   Protective Sensation (w/ 10 gram monofilament):  Right: Intact  Left: Intact    Visual Inspection:  Normal -  Bilateral    Pedal Pulses:   Right: Present  Left: Present    Posterior tibialis:   Right:Present  Left: Present    Goals  Patient has selected/evaluated goals during today's session: Yes, selected  Healthy Eating: Set(use meal plan - carb portions/spacing, renal guidelines (sodium, pro, potassium))  Start Date: 05/06/19  Target Date: 06/10/19  Monitoring: Set(test BG 2x/d -fst, ac, 2hr pp; bring meter to clinic)  Start Date: 05/06/19  Target Date: 06/10/19     Diabetes Care Plan/Intervention  Education Plan/Intervention: Individual Follow-Up DSMT Printed potassium list provided.    Diabetes Meal Plan  Restrictions: Low Fat, Low Sodium, Low  Potassium(Low Phosphorus)  Calories: 1400  Carbohydrate Per Meal: 30-45g  Carbohydrate Per Snack : 7-15g    Today's Self-Management Care Plan was developed with the patient's input and is based on barriers identified during today's assessment.    The long and short-term goals in the care plan were written with the patient/caregiver's input. The patient has agreed to work toward these goals to improve her overall diabetes control.      The patient received a copy of today's self-management plan and verbalized understanding of the care plan, goals, and all of today's instructions.      The patient was encouraged to communicate with her physician and care team regarding her condition(s) and treatment.  I provided the patient with my contact information today and encouraged her to contact me via phone or patient portal as needed.     Education Units of Time   Time Spent: 60 min

## 2019-05-06 NOTE — LETTER
May 6, 2019        Leonard Bryan MD  [address] [place] [address]             [facility]  [address] [place] [address]  Phone: [phone]  Fax: [phone]   Patient: Avril Monzon   MR Number: [id number]   YOB: 1945   Date of Visit: 5/6/2019       Dear Dr. Bryan:    Thank you for referring Avril Monzon to me for evaluation. Below are the relevant portions of my assessment and plan of care.     If you have questions, please do not hesitate to call me. I look forward to following Avril along with you.    Sincerely,      Conchita Garcia RD, CDE           CC  Rose Germain MD

## 2019-05-07 ENCOUNTER — LAB VISIT (OUTPATIENT)
Dept: LAB | Facility: HOSPITAL | Age: 74
End: 2019-05-07
Attending: INTERNAL MEDICINE
Payer: MEDICARE

## 2019-05-07 ENCOUNTER — OFFICE VISIT (OUTPATIENT)
Dept: HEMATOLOGY/ONCOLOGY | Facility: CLINIC | Age: 74
End: 2019-05-07
Payer: MEDICARE

## 2019-05-07 VITALS
SYSTOLIC BLOOD PRESSURE: 125 MMHG | BODY MASS INDEX: 24.21 KG/M2 | HEIGHT: 65 IN | WEIGHT: 145.31 LBS | OXYGEN SATURATION: 98 % | HEART RATE: 70 BPM | RESPIRATION RATE: 18 BRPM | DIASTOLIC BLOOD PRESSURE: 52 MMHG | TEMPERATURE: 98 F

## 2019-05-07 DIAGNOSIS — N18.4 CKD (CHRONIC KIDNEY DISEASE), STAGE IV: ICD-10-CM

## 2019-05-07 DIAGNOSIS — N18.9 ANEMIA ASSOCIATED WITH CHRONIC RENAL FAILURE: ICD-10-CM

## 2019-05-07 DIAGNOSIS — D63.1 ANEMIA DUE TO STAGE 5 CHRONIC KIDNEY DISEASE: ICD-10-CM

## 2019-05-07 DIAGNOSIS — D63.1 ANEMIA IN STAGE 4 CHRONIC KIDNEY DISEASE: ICD-10-CM

## 2019-05-07 DIAGNOSIS — N18.4 ANEMIA IN STAGE 4 CHRONIC KIDNEY DISEASE: ICD-10-CM

## 2019-05-07 DIAGNOSIS — D50.0 IRON DEFICIENCY ANEMIA DUE TO CHRONIC BLOOD LOSS: Primary | ICD-10-CM

## 2019-05-07 DIAGNOSIS — D50.0 IRON DEFICIENCY ANEMIA DUE TO CHRONIC BLOOD LOSS: ICD-10-CM

## 2019-05-07 DIAGNOSIS — N18.5 ANEMIA DUE TO STAGE 5 CHRONIC KIDNEY DISEASE: ICD-10-CM

## 2019-05-07 DIAGNOSIS — D63.1 ANEMIA ASSOCIATED WITH CHRONIC RENAL FAILURE: ICD-10-CM

## 2019-05-07 LAB
ALBUMIN SERPL BCP-MCNC: 3.7 G/DL (ref 3.5–5.2)
ALP SERPL-CCNC: 97 U/L (ref 55–135)
ALT SERPL W/O P-5'-P-CCNC: 21 U/L (ref 10–44)
ANION GAP SERPL CALC-SCNC: 10 MMOL/L (ref 8–16)
AST SERPL-CCNC: 16 U/L (ref 10–40)
BASOPHILS # BLD AUTO: 0.01 K/UL (ref 0–0.2)
BASOPHILS # BLD AUTO: 0.01 K/UL (ref 0–0.2)
BASOPHILS NFR BLD: 0.2 % (ref 0–1.9)
BASOPHILS NFR BLD: 0.2 % (ref 0–1.9)
BILIRUB SERPL-MCNC: 0.3 MG/DL (ref 0.1–1)
BUN SERPL-MCNC: 58 MG/DL (ref 8–23)
CALCIUM SERPL-MCNC: 9.2 MG/DL (ref 8.7–10.5)
CHLORIDE SERPL-SCNC: 108 MMOL/L (ref 95–110)
CO2 SERPL-SCNC: 24 MMOL/L (ref 23–29)
CREAT SERPL-MCNC: 3.7 MG/DL (ref 0.5–1.4)
DIFFERENTIAL METHOD: ABNORMAL
DIFFERENTIAL METHOD: ABNORMAL
EOSINOPHIL # BLD AUTO: 0 K/UL (ref 0–0.5)
EOSINOPHIL # BLD AUTO: 0 K/UL (ref 0–0.5)
EOSINOPHIL NFR BLD: 0.8 % (ref 0–8)
EOSINOPHIL NFR BLD: 0.8 % (ref 0–8)
ERYTHROCYTE [DISTWIDTH] IN BLOOD BY AUTOMATED COUNT: 12.1 % (ref 11.5–14.5)
ERYTHROCYTE [DISTWIDTH] IN BLOOD BY AUTOMATED COUNT: 12.1 % (ref 11.5–14.5)
EST. GFR  (AFRICAN AMERICAN): 13 ML/MIN/1.73 M^2
EST. GFR  (NON AFRICAN AMERICAN): 12 ML/MIN/1.73 M^2
FERRITIN SERPL-MCNC: 1186 NG/ML (ref 20–300)
GLUCOSE SERPL-MCNC: 196 MG/DL (ref 70–110)
HCT VFR BLD AUTO: 30.4 % (ref 37–48.5)
HCT VFR BLD AUTO: 30.4 % (ref 37–48.5)
HGB BLD-MCNC: 9.4 G/DL (ref 12–16)
HGB BLD-MCNC: 9.4 G/DL (ref 12–16)
IMM GRANULOCYTES # BLD AUTO: 0.01 K/UL (ref 0–0.04)
IMM GRANULOCYTES # BLD AUTO: 0.01 K/UL (ref 0–0.04)
IMM GRANULOCYTES NFR BLD AUTO: 0.2 % (ref 0–0.5)
IMM GRANULOCYTES NFR BLD AUTO: 0.2 % (ref 0–0.5)
IRON SERPL-MCNC: 76 UG/DL (ref 30–160)
LDH SERPL L TO P-CCNC: 212 U/L (ref 110–260)
LYMPHOCYTES # BLD AUTO: 0.9 K/UL (ref 1–4.8)
LYMPHOCYTES # BLD AUTO: 0.9 K/UL (ref 1–4.8)
LYMPHOCYTES NFR BLD: 16 % (ref 18–48)
LYMPHOCYTES NFR BLD: 16 % (ref 18–48)
MCH RBC QN AUTO: 30.1 PG (ref 27–31)
MCH RBC QN AUTO: 30.1 PG (ref 27–31)
MCHC RBC AUTO-ENTMCNC: 30.9 G/DL (ref 32–36)
MCHC RBC AUTO-ENTMCNC: 30.9 G/DL (ref 32–36)
MCV RBC AUTO: 97 FL (ref 82–98)
MCV RBC AUTO: 97 FL (ref 82–98)
MONOCYTES # BLD AUTO: 0.3 K/UL (ref 0.3–1)
MONOCYTES # BLD AUTO: 0.3 K/UL (ref 0.3–1)
MONOCYTES NFR BLD: 5.5 % (ref 4–15)
MONOCYTES NFR BLD: 5.5 % (ref 4–15)
NEUTROPHILS # BLD AUTO: 4.1 K/UL (ref 1.8–7.7)
NEUTROPHILS # BLD AUTO: 4.1 K/UL (ref 1.8–7.7)
NEUTROPHILS NFR BLD: 77.5 % (ref 38–73)
NEUTROPHILS NFR BLD: 77.5 % (ref 38–73)
NRBC BLD-RTO: 0 /100 WBC
NRBC BLD-RTO: 0 /100 WBC
PLATELET # BLD AUTO: 166 K/UL (ref 150–350)
PLATELET # BLD AUTO: 166 K/UL (ref 150–350)
PMV BLD AUTO: 9.6 FL (ref 9.2–12.9)
PMV BLD AUTO: 9.6 FL (ref 9.2–12.9)
POTASSIUM SERPL-SCNC: 4.7 MMOL/L (ref 3.5–5.1)
PROT SERPL-MCNC: 6.7 G/DL (ref 6–8.4)
RBC # BLD AUTO: 3.12 M/UL (ref 4–5.4)
RBC # BLD AUTO: 3.12 M/UL (ref 4–5.4)
RETICS/RBC NFR AUTO: 1.6 % (ref 0.5–2.5)
SATURATED IRON: 31 % (ref 20–50)
SODIUM SERPL-SCNC: 142 MMOL/L (ref 136–145)
TOTAL IRON BINDING CAPACITY: 244 UG/DL (ref 250–450)
TRANSFERRIN SERPL-MCNC: 165 MG/DL (ref 200–375)
WBC # BLD AUTO: 5.32 K/UL (ref 3.9–12.7)
WBC # BLD AUTO: 5.32 K/UL (ref 3.9–12.7)

## 2019-05-07 PROCEDURE — 1101F PR PT FALLS ASSESS DOC 0-1 FALLS W/OUT INJ PAST YR: ICD-10-PCS | Mod: CPTII,S$GLB,, | Performed by: INTERNAL MEDICINE

## 2019-05-07 PROCEDURE — 82728 ASSAY OF FERRITIN: CPT | Mod: TXP

## 2019-05-07 PROCEDURE — 3078F PR MOST RECENT DIASTOLIC BLOOD PRESSURE < 80 MM HG: ICD-10-PCS | Mod: CPTII,S$GLB,, | Performed by: INTERNAL MEDICINE

## 2019-05-07 PROCEDURE — 83615 LACTATE (LD) (LDH) ENZYME: CPT | Mod: TXP

## 2019-05-07 PROCEDURE — 3074F PR MOST RECENT SYSTOLIC BLOOD PRESSURE < 130 MM HG: ICD-10-PCS | Mod: CPTII,S$GLB,, | Performed by: INTERNAL MEDICINE

## 2019-05-07 PROCEDURE — 99999 PR PBB SHADOW E&M-EST. PATIENT-LVL III: ICD-10-PCS | Mod: PBBFAC,,, | Performed by: INTERNAL MEDICINE

## 2019-05-07 PROCEDURE — 3074F SYST BP LT 130 MM HG: CPT | Mod: CPTII,S$GLB,, | Performed by: INTERNAL MEDICINE

## 2019-05-07 PROCEDURE — 85025 COMPLETE CBC W/AUTO DIFF WBC: CPT | Mod: NTX

## 2019-05-07 PROCEDURE — 99214 OFFICE O/P EST MOD 30 MIN: CPT | Mod: S$GLB,,, | Performed by: INTERNAL MEDICINE

## 2019-05-07 PROCEDURE — 99214 PR OFFICE/OUTPT VISIT, EST, LEVL IV, 30-39 MIN: ICD-10-PCS | Mod: S$GLB,,, | Performed by: INTERNAL MEDICINE

## 2019-05-07 PROCEDURE — 1101F PT FALLS ASSESS-DOCD LE1/YR: CPT | Mod: CPTII,S$GLB,, | Performed by: INTERNAL MEDICINE

## 2019-05-07 PROCEDURE — 83540 ASSAY OF IRON: CPT | Mod: NTX

## 2019-05-07 PROCEDURE — 85045 AUTOMATED RETICULOCYTE COUNT: CPT | Mod: NTX

## 2019-05-07 PROCEDURE — 80053 COMPREHEN METABOLIC PANEL: CPT | Mod: TXP

## 2019-05-07 PROCEDURE — 99999 PR PBB SHADOW E&M-EST. PATIENT-LVL III: CPT | Mod: PBBFAC,,, | Performed by: INTERNAL MEDICINE

## 2019-05-07 PROCEDURE — 36415 COLL VENOUS BLD VENIPUNCTURE: CPT | Mod: NTX

## 2019-05-07 PROCEDURE — 3078F DIAST BP <80 MM HG: CPT | Mod: CPTII,S$GLB,, | Performed by: INTERNAL MEDICINE

## 2019-05-07 NOTE — PROGRESS NOTES
Hematology/Oncology Office Note    Reason for referral:  Normocytic anemia    CC:  Anemia    Referred by:  Self, Aaareferral    Diagnosis:  Normocytic anemia  CKD    Treatment:  Procrit 10,000 units biweekly      History of present illness:  71-year-old female with a history of diabetes mellitus, CKD, hypertension, and dyslipidemia who was been referred for progressive normocytic anemia.  Patient has significant CK D with baseline creatinine approximately 3.0.  Hemoglobin noted to be 9.3 on 6/14/16 and is trended down since 2012 when he was noted to be  11.3.  She reports fatigue, malaise, and cold intolerance, however, denies other significant symptoms.      I have reviewed and updated the HPI, ROS, PMHx, Social Hx, Family Hx and treatment history.      Today's visit:  Patient is without complaints today.      Past Medical History:   Diagnosis Date    Acute hypoxemic respiratory failure 11/26/2018    Anemia     Arthritis     back, hand, knees    Back pain     Cataract     CKD stage G4/A3, GFR 15 - 29 and albumin creatinine ratio >300 mg/g     GFR 40% Jan 2014 and 33% ub 3/2014 ([facility])    Coronary artery disease involving native coronary artery of native heart 11/30/2018    Diabetic retinopathy     DM (diabetes mellitus) type II controlled, neurological manifestation     Exudative age-related macular degeneration of left eye with active choroidal neovascularization 2/5/2019    GERD (gastroesophageal reflux disease)     Glaucoma     Heart failure     Hyperlipidemia     Hypertension     NSTEMI (non-ST elevated myocardial infarction) 11/27/2018    Peripheral neuropathy     Polyneuropathy     Proteinuria     >6 mo     Seizures     [facility] 1/2014 -dick CT NRI showed small vessel    Stroke 2012,2014    Tobacco dependence     Type 2 diabetes with peripheral circulatory disorder, controlled          Social History:  No tobacco, alcohol, or illicit drugs    Family History: family history includes Cancer in  "her maternal grandmother; Diabetes in her mother; Heart disease in her mother; Hyperlipidemia in her mother; Hypertension in her mother; Muscular dystrophy in her son.      HPI    Review of Systems   Constitutional: Negative for activity change, chills, diaphoresis, fatigue, fever and unexpected weight change.   HENT: Negative for congestion, drooling, ear discharge, hearing loss, mouth sores, nosebleeds, rhinorrhea and sinus pressure.    Eyes: Negative.    Respiratory: Negative for apnea, cough, choking, chest tightness and stridor.    Cardiovascular: Negative for palpitations and leg swelling.   Gastrointestinal: Negative for abdominal distention, blood in stool, diarrhea, nausea, rectal pain and vomiting.   Endocrine: Negative.    Genitourinary: Negative for decreased urine volume, difficulty urinating, dyspareunia, enuresis, flank pain, genital sores and hematuria.   Musculoskeletal: Negative for arthralgias, back pain, gait problem, joint swelling, myalgias and neck pain.   Skin: Negative for color change, pallor and rash.   Allergic/Immunologic: Negative.    Neurological: Negative.  Negative for dizziness, tremors, syncope, facial asymmetry, speech difficulty, light-headedness, numbness and headaches.   Hematological: Negative for adenopathy. Does not bruise/bleed easily.   Psychiatric/Behavioral: Negative for agitation, confusion, decreased concentration and dysphoric mood.       Objective:       Vitals:    05/07/19 1355   BP: (!) 125/52   Pulse: 70   Resp: 18   Temp: 98 °F (36.7 °C)   TempSrc: Oral   SpO2: 98%   Weight: 65.9 kg (145 lb 4.5 oz)   Height: 5' 5" (1.651 m)     Physical Exam   Constitutional: She is oriented to person, place, and time. She appears well-developed and well-nourished. No distress.   Well groomed   HENT:   Head: Normocephalic and atraumatic.   Right Ear: External ear normal.   Left Ear: External ear normal.   Nose: Nose normal. Right sinus exhibits no maxillary sinus tenderness and " no frontal sinus tenderness. Left sinus exhibits no maxillary sinus tenderness and no frontal sinus tenderness.   Mouth/Throat: Oropharynx is clear and moist and mucous membranes are normal. Mucous membranes are not pale and not dry. No oral lesions. Normal dentition. No oropharyngeal exudate.   Eyes: Pupils are equal, round, and reactive to light. Conjunctivae, EOM and lids are normal. Lids are everted and swept, no foreign bodies found. Right eye exhibits no discharge.   Neck: Trachea normal and normal range of motion. Neck supple. No tracheal deviation present. No thyroid mass and no thyromegaly present.   No crepitus   Cardiovascular: Normal rate, regular rhythm, intact distal pulses and normal pulses. Exam reveals no gallop and no friction rub.   Murmur heard.  Pulmonary/Chest: Effort normal and breath sounds normal. No respiratory distress. She has no wheezes. She has no rales. She exhibits no tenderness.   Abdominal: Soft. Normal appearance and bowel sounds are normal. She exhibits no distension. There is no hepatosplenomegaly. There is no tenderness. There is no guarding.   Musculoskeletal: Normal range of motion. She exhibits no tenderness.   Lymphadenopathy:        Head (right side): No submental and no submandibular adenopathy present.        Head (left side): No submental and no submandibular adenopathy present.     She has no cervical adenopathy.     She has no axillary adenopathy.   Neurological: She is alert and oriented to person, place, and time. No cranial nerve deficit. Coordination normal.   Skin: Skin is warm, dry and intact. Capillary refill takes less than 2 seconds. No abrasion, no bruising, no ecchymosis and no rash noted. Rash is not macular, not pustular and not urticarial. No cyanosis. Nails show no clubbing.   Psychiatric: She has a normal mood and affect. Her behavior is normal.       Lab Results   Component Value Date    WBC 4.86 03/27/2019    HGB 8.5 (L) 03/27/2019    HCT 26.1 (L)  03/27/2019    MCV 94 03/27/2019     03/27/2019     Lab Results   Component Value Date    CREATININE 3.1 (H) 03/27/2019    BUN 61 (H) 03/27/2019     03/27/2019    K 3.8 03/27/2019     03/27/2019    CO2 26 03/27/2019     Lab Results   Component Value Date    ALT 28 03/26/2019    AST 18 03/26/2019    ALKPHOS 92 03/26/2019    BILITOT 0.3 03/26/2019         Assessment:       71-year-old female with progressive normocytic anemia likely secondary to CKD.  Workup has also revealed a relative iron deficiency with a ferritin of 20 and 12% iron saturation.  Therefore, she received Feraheme 510 mg ×2 doses given 7/2016.  Procrit 10,000 units every 2 weeks (started 5/2017).  She has responded appropriately to Procrit.    Unfortunately she discontinued Procrit in 10/2017 due to social circumstances with an ill .   Her hemoglobin remains greater than 10 (10.5) while she has not had Procrit since 11/2017.   Iron deficiency has been treated with Injectafer 750 mg IV ×2 doses given in 3/2018 and again in 03/2019  Renal function and anemia has worsened over the previous 6 months and we will plan to start Procrit after confirmation of adequate iron stores     iron deficiency:  -- status post Injectafer in 3/2018  -- refer to GI clinic to evaluate for endoscopies  --repeat CBC iron studies today to confirm the adequate iron stores      Normocytic anemia: Secondary to CKD:  --Continue to monitor CBC and plan to initiate Procrit when hemoglobin falls below 9.5  --the patient has not had Procrit since 11/2017  --restart Procrit 20K weeklyif iron studies are adequate  --follow up with in P in 3 weeks to repeat CBC in adjust Procrit accordingly

## 2019-05-14 ENCOUNTER — INFUSION (OUTPATIENT)
Dept: INFUSION THERAPY | Facility: HOSPITAL | Age: 74
End: 2019-05-14
Attending: INTERNAL MEDICINE
Payer: MEDICARE

## 2019-05-14 VITALS
SYSTOLIC BLOOD PRESSURE: 127 MMHG | RESPIRATION RATE: 18 BRPM | TEMPERATURE: 98 F | OXYGEN SATURATION: 98 % | HEART RATE: 66 BPM | DIASTOLIC BLOOD PRESSURE: 67 MMHG

## 2019-05-14 DIAGNOSIS — N18.4 CHRONIC KIDNEY DISEASE (CKD) STAGE G4/A3, SEVERELY DECREASED GLOMERULAR FILTRATION RATE (GFR) BETWEEN 15-29 ML/MIN/1.73 SQUARE METER AND ALBUMINURIA CREATININE RATIO GREATER THAN 300 MG/G: ICD-10-CM

## 2019-05-14 DIAGNOSIS — N18.5 STAGE 5 CHRONIC KIDNEY DISEASE: ICD-10-CM

## 2019-05-14 DIAGNOSIS — D63.1 ANEMIA IN STAGE 4 CHRONIC KIDNEY DISEASE: ICD-10-CM

## 2019-05-14 DIAGNOSIS — D50.0 IRON DEFICIENCY ANEMIA DUE TO CHRONIC BLOOD LOSS: ICD-10-CM

## 2019-05-14 DIAGNOSIS — N18.5 ANEMIA DUE TO STAGE 5 CHRONIC KIDNEY DISEASE: Primary | ICD-10-CM

## 2019-05-14 DIAGNOSIS — D63.1 ANEMIA DUE TO STAGE 5 CHRONIC KIDNEY DISEASE: Primary | ICD-10-CM

## 2019-05-14 DIAGNOSIS — N18.4 ANEMIA IN STAGE 4 CHRONIC KIDNEY DISEASE: ICD-10-CM

## 2019-05-14 PROCEDURE — 96372 THER/PROPH/DIAG INJ SC/IM: CPT

## 2019-05-14 PROCEDURE — 63600175 PHARM REV CODE 636 W HCPCS: Mod: JG,EC | Performed by: INTERNAL MEDICINE

## 2019-05-14 RX ADMIN — ERYTHROPOIETIN 20000 UNITS: 20000 INJECTION, SOLUTION INTRAVENOUS; SUBCUTANEOUS at 01:05

## 2019-05-15 ENCOUNTER — PROCEDURE VISIT (OUTPATIENT)
Dept: OPHTHALMOLOGY | Facility: CLINIC | Age: 74
End: 2019-05-15
Payer: MEDICARE

## 2019-05-15 ENCOUNTER — LAB VISIT (OUTPATIENT)
Dept: LAB | Facility: HOSPITAL | Age: 74
End: 2019-05-15
Attending: INTERNAL MEDICINE
Payer: MEDICARE

## 2019-05-15 DIAGNOSIS — E11.3553 TYPE 2 DIABETES MELLITUS WITH STABLE PROLIFERATIVE RETINOPATHY OF BOTH EYES, WITH LONG-TERM CURRENT USE OF INSULIN: Primary | ICD-10-CM

## 2019-05-15 DIAGNOSIS — N18.4 CHRONIC KIDNEY DISEASE (CKD), STAGE IV (SEVERE): ICD-10-CM

## 2019-05-15 DIAGNOSIS — Z79.4 TYPE 2 DIABETES MELLITUS WITH STABLE PROLIFERATIVE RETINOPATHY OF BOTH EYES, WITH LONG-TERM CURRENT USE OF INSULIN: Primary | ICD-10-CM

## 2019-05-15 LAB
25(OH)D3+25(OH)D2 SERPL-MCNC: 15 NG/ML (ref 30–96)
ALBUMIN SERPL BCP-MCNC: 3.8 G/DL (ref 3.5–5.2)
ANION GAP SERPL CALC-SCNC: 11 MMOL/L (ref 8–16)
BUN SERPL-MCNC: 60 MG/DL (ref 8–23)
CALCIUM SERPL-MCNC: 9.6 MG/DL (ref 8.7–10.5)
CHLORIDE SERPL-SCNC: 110 MMOL/L (ref 95–110)
CO2 SERPL-SCNC: 23 MMOL/L (ref 23–29)
CREAT SERPL-MCNC: 3.4 MG/DL (ref 0.5–1.4)
EST. GFR  (AFRICAN AMERICAN): 14.8 ML/MIN/1.73 M^2
EST. GFR  (NON AFRICAN AMERICAN): 12.8 ML/MIN/1.73 M^2
GLUCOSE SERPL-MCNC: 144 MG/DL (ref 70–110)
PHOSPHATE SERPL-MCNC: 4.4 MG/DL (ref 2.7–4.5)
POTASSIUM SERPL-SCNC: 4.2 MMOL/L (ref 3.5–5.1)
PTH-INTACT SERPL-MCNC: 414 PG/ML (ref 9–77)
SODIUM SERPL-SCNC: 144 MMOL/L (ref 136–145)
URATE SERPL-MCNC: 8.2 MG/DL (ref 2.4–5.7)

## 2019-05-15 PROCEDURE — 67028 INJECTION EYE DRUG: CPT | Mod: LT,S$GLB,TXP, | Performed by: OPHTHALMOLOGY

## 2019-05-15 PROCEDURE — 36415 COLL VENOUS BLD VENIPUNCTURE: CPT | Mod: TXP

## 2019-05-15 PROCEDURE — 80069 RENAL FUNCTION PANEL: CPT | Mod: NTX

## 2019-05-15 PROCEDURE — 84550 ASSAY OF BLOOD/URIC ACID: CPT | Mod: NTX

## 2019-05-15 PROCEDURE — 82306 VITAMIN D 25 HYDROXY: CPT | Mod: TXP

## 2019-05-15 PROCEDURE — 99499 UNLISTED E&M SERVICE: CPT | Mod: S$GLB,TXP,, | Performed by: OPHTHALMOLOGY

## 2019-05-15 PROCEDURE — 99499 NO LOS: ICD-10-PCS | Mod: S$GLB,TXP,, | Performed by: OPHTHALMOLOGY

## 2019-05-15 PROCEDURE — 92134 CPTRZ OPH DX IMG PST SGM RTA: CPT | Mod: S$GLB,TXP,, | Performed by: OPHTHALMOLOGY

## 2019-05-15 PROCEDURE — 92134 POSTERIOR SEGMENT OCT RETINA (OCULAR COHERENCE TOMOGRAPHY)-BOTH EYES: ICD-10-PCS | Mod: S$GLB,TXP,, | Performed by: OPHTHALMOLOGY

## 2019-05-15 PROCEDURE — 67028 PR INJECT INTRAVITREAL PHARMCOLOGIC: ICD-10-PCS | Mod: LT,S$GLB,TXP, | Performed by: OPHTHALMOLOGY

## 2019-05-15 PROCEDURE — 83970 ASSAY OF PARATHORMONE: CPT | Mod: NTX

## 2019-05-15 RX ORDER — CIPROFLOXACIN HYDROCHLORIDE 3 MG/ML
1 SOLUTION/ DROPS OPHTHALMIC 4 TIMES DAILY
Qty: 5 ML | Refills: 1 | Status: SHIPPED | OUTPATIENT
Start: 2019-05-15 | End: 2019-06-04

## 2019-05-15 NOTE — PROGRESS NOTES
===============================  05/15/2019   Avril Monzon,   74 y.o. female   Last visit [facility]: :4/12/2019   Last [facility]  [facility]:  Corrected distance visual acuity was not recorded in the right eye and 20/200 in the left eye.   Not recorded         Not recorded         Not recorded        Chief Complaint   Patient presents with    srn     eylea os        HPI     srn      Additional comments: eylea os              Comments     1.) DM SINCE 1995  PDR  S/P LASER AND INJECTIONS OS(DR BOX AND AT [facility])  2.) NLP OD SINCE BIRTH  3.) DENSE CAT OD / 2+NS OS  4.) Asteroid Hyalosis  5.)iop 42 od 11/5/18  Avastin os 3/15/19  EYLEA OS # 1,4/12/19          Last edited by JOSE Alegre on 5/15/2019  8:29 AM. (History)          ________________  5/15/2019  Problem List Items Addressed This Visit        Eye/Vision problems    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, with long-term current use of insulin - Primary    Relevant Medications    aflibercept Soln 2 mg (Completed)    Other Relevant Orders    Posterior Segment OCT Retina-Both eyes (Completed)    Prior Authorization Order        Os eyl;ea       5/15/2019  Diagnosis :  Os srn    Today:   Eylea (afibercept) 2 mg/0.05 ml Intravitreal Injection , OS   Follow up: rtc 1 mo        Instructed to call 24/7 for any worsening of vision. Check Both eyes daily. Gave patient my home phone number.      Procedure  Note:   OS}  Eylea (afibercept) 2 mg/0.05 ml Intravitreal Injection    I have explained the Risks, Benefits and Alternatives of the procedure in detail.  The patient voices understanding and all questions have been answered.  The patient agrees to proceed as discussed.  Xylocaine with Epi 2%  subconj bleb  was used for anesthesia.  Topical betadine was used for antisepsis.  0.05 cc was  injected 3.7 mm from corneal limbus in the inferotemporal quadrant.  Following injection the IOP was less than thirty (<30) by  tonopen.  The eye was then thoroughly irrigated with BSS.  Patient tolerated procedure well.  No complications were observed.  The Patient was educated that mild irritation tonight was normal secondary to topical antispsis use.  Pt was advised to call at any time day or night for pain, redness, or any decline in vision. I gave the patient my home number as well as the clinic on call number. Daily visual checks and Amsler grid testing were reviewed.  ciloxan Antibiotic Drops to be used 4 times daily for 4 days  JADIEL Coello MD  Procedure ordered: y  Consent: y  Pre auth: y  MAR:y  Opnote: [id number]  Charge capture:y      .       ===========================

## 2019-05-21 ENCOUNTER — INFUSION (OUTPATIENT)
Dept: INFUSION THERAPY | Facility: HOSPITAL | Age: 74
End: 2019-05-21
Attending: INTERNAL MEDICINE
Payer: MEDICARE

## 2019-05-21 VITALS
DIASTOLIC BLOOD PRESSURE: 66 MMHG | TEMPERATURE: 98 F | SYSTOLIC BLOOD PRESSURE: 147 MMHG | RESPIRATION RATE: 18 BRPM | HEART RATE: 66 BPM

## 2019-05-21 DIAGNOSIS — N18.5 STAGE 5 CHRONIC KIDNEY DISEASE: ICD-10-CM

## 2019-05-21 DIAGNOSIS — N18.4 CHRONIC KIDNEY DISEASE (CKD) STAGE G4/A3, SEVERELY DECREASED GLOMERULAR FILTRATION RATE (GFR) BETWEEN 15-29 ML/MIN/1.73 SQUARE METER AND ALBUMINURIA CREATININE RATIO GREATER THAN 300 MG/G: ICD-10-CM

## 2019-05-21 DIAGNOSIS — D50.0 IRON DEFICIENCY ANEMIA DUE TO CHRONIC BLOOD LOSS: ICD-10-CM

## 2019-05-21 DIAGNOSIS — D63.1 ANEMIA DUE TO STAGE 5 CHRONIC KIDNEY DISEASE: Primary | ICD-10-CM

## 2019-05-21 DIAGNOSIS — N18.4 ANEMIA IN STAGE 4 CHRONIC KIDNEY DISEASE: ICD-10-CM

## 2019-05-21 DIAGNOSIS — N18.5 ANEMIA DUE TO STAGE 5 CHRONIC KIDNEY DISEASE: Primary | ICD-10-CM

## 2019-05-21 DIAGNOSIS — D63.1 ANEMIA IN STAGE 4 CHRONIC KIDNEY DISEASE: ICD-10-CM

## 2019-05-21 PROCEDURE — 63600175 PHARM REV CODE 636 W HCPCS: Mod: JG | Performed by: INTERNAL MEDICINE

## 2019-05-21 PROCEDURE — 96372 THER/PROPH/DIAG INJ SC/IM: CPT

## 2019-05-21 RX ADMIN — ERYTHROPOIETIN 20000 UNITS: 20000 INJECTION, SOLUTION INTRAVENOUS; SUBCUTANEOUS at 01:05

## 2019-05-30 DIAGNOSIS — Z76.82 ORGAN TRANSPLANT CANDIDATE: Primary | ICD-10-CM

## 2019-06-04 ENCOUNTER — TELEPHONE (OUTPATIENT)
Dept: TRANSPLANT | Facility: CLINIC | Age: 74
End: 2019-06-04

## 2019-06-06 NOTE — NURSING
HPI     Eyal Murray is a 54year old female here for f/u of Malignant neoplasm of upper-outer quadrant of left breast in female, estrogen receptor positive (hcc)  (primary encounter diagnosis)  Encounter for monitoring tamoxifen therapy    She is Went over discharge instructions with patient.   Stressed importance of making and keeping all follow ups.  Patient verbalized understanding and has no questions in regards to discharge.  IV removed, catheter intact.  Telemetry box 8570 removed and returned to monitor tech.  Patient awaiting personal transportation, instructed to call nurse station once ride has arrived.  Primary nurse notified of pt's discharge status.    EPINEPHrine 0.3 MG/0.3ML Injection Solution Auto-injector INJECT 0.3 ML (1 EACH TOTAL) AS DIRECTED ONE TIME FOR 1 DOSE.  AS NEEDED FOR ANAPHYLAXIS Disp:  Rfl: 1   ALPRAZolam 0.25 MG Oral Tab Take 1 tablet (0.25 mg total) by mouth nightly as needed for Anxie Years since quittin.6      Smokeless tobacco: Never Used    Alcohol use: Yes      Comment: socially    Drug use: No      Comment: never      Family History   Problem Relation Age of Onset   • Breast Cancer Paternal Grandmother 37        d. 79 Psychiatric: She has a normal mood and affect. Vitals reviewed.           ASSESSMENT/PLAN:   Malignant neoplasm of upper-outer quadrant of left breast in female, estrogen receptor positive (hcc)  (primary encounter diagnosis)  Encounter for monitoring oliva

## 2019-06-14 ENCOUNTER — PROCEDURE VISIT (OUTPATIENT)
Dept: OPHTHALMOLOGY | Facility: CLINIC | Age: 74
End: 2019-06-14
Payer: MEDICARE

## 2019-06-14 DIAGNOSIS — E11.3553 TYPE 2 DIABETES MELLITUS WITH STABLE PROLIFERATIVE RETINOPATHY OF BOTH EYES, WITH LONG-TERM CURRENT USE OF INSULIN: ICD-10-CM

## 2019-06-14 DIAGNOSIS — Z79.4 TYPE 2 DIABETES MELLITUS WITH STABLE PROLIFERATIVE RETINOPATHY OF BOTH EYES, WITH LONG-TERM CURRENT USE OF INSULIN: ICD-10-CM

## 2019-06-14 DIAGNOSIS — H35.3221 EXUDATIVE AGE-RELATED MACULAR DEGENERATION OF LEFT EYE WITH ACTIVE CHOROIDAL NEOVASCULARIZATION: Primary | ICD-10-CM

## 2019-06-14 PROCEDURE — 67028 PR INJECT INTRAVITREAL PHARMCOLOGIC: ICD-10-PCS | Mod: LT,S$GLB,TXP, | Performed by: OPHTHALMOLOGY

## 2019-06-14 PROCEDURE — 99499 NO LOS: ICD-10-PCS | Mod: S$GLB,TXP,, | Performed by: OPHTHALMOLOGY

## 2019-06-14 PROCEDURE — 92134 POSTERIOR SEGMENT OCT RETINA (OCULAR COHERENCE TOMOGRAPHY)-BOTH EYES: ICD-10-PCS | Mod: S$GLB,TXP,, | Performed by: OPHTHALMOLOGY

## 2019-06-14 PROCEDURE — 92134 CPTRZ OPH DX IMG PST SGM RTA: CPT | Mod: S$GLB,TXP,, | Performed by: OPHTHALMOLOGY

## 2019-06-14 PROCEDURE — 99499 UNLISTED E&M SERVICE: CPT | Mod: S$GLB,TXP,, | Performed by: OPHTHALMOLOGY

## 2019-06-14 PROCEDURE — 67028 INJECTION EYE DRUG: CPT | Mod: LT,S$GLB,TXP, | Performed by: OPHTHALMOLOGY

## 2019-06-14 NOTE — PROGRESS NOTES
"    ===============================  06/14/2019   Avril Monzon,   74 y.o. female   Last visit [facility]: :5/15/2019   Last [facility]:  Corrected distance visual acuity was NLP in the right eye and 20/400 in the left eye.   Not recorded        Wearing Rx     Wearing Rx       Sphere Cylinder Axis Add    Right +0.50 +1.00 140 +3.50    Left +0.50 +1.00 140 +3.50    Type:  Bifocal               Not recorded        Chief Complaint   Patient presents with    SRN     Eylea OS        HPI     SRN      Additional comments: Eylea OS              Comments     Noncompliant with visits    1.) DM SINCE 1995  PDR  S/P LASER AND INJECTIONS OS(DR BOX AND AT [facility])  2.) NLP OD SINCE BIRTH  3.) DENSE CAT OD / 2+NS OS  4.) Asteroid Hyalosis  5.)iop 42 od 11/5/18  Avastin os 3/15/19  EYLEA OS # 1,4/12/19          Last edited by Janice Hinds on 6/14/2019 11:49 AM. (History)          ________________  6/14/2019  Problem List Items Addressed This Visit        Eye/Vision problems    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, with long-term current use of insulin    Relevant Medications    aflibercept Soln 2 mg (Completed)    Other Relevant Orders    Posterior Segment OCT Retina-Both eyes (Completed)    Prior Authorization Order    Exudative age-related macular degeneration of left eye with active choroidal neovascularization - Primary    Relevant Medications    aflibercept Soln 2 mg (Completed)    Other Relevant Orders    Posterior Segment OCT Retina-Both eyes (Completed)    Prior Authorization Order      od nlp nvg  Quite moncu;ar    os srn   today av gf #5  Day 1"  Today :        So dont stop  In this mopncular   6/14/2019  Diagnosis :  Os srn  Today:   Eylea (afibercept) 2 mg/0.05 ml Intravitreal Injection , OS   Follow up: rtc 1 mo        Instructed to call 24/7 for any worsening of vision. Check Both eyes daily. Gave patient my home phone number.      Procedure  Note:   OS}  " Eylea (afibercept) 2 mg/0.05 ml Intravitreal Injection    I have explained the Risks, Benefits and Alternatives of the procedure in detail.  The patient voices understanding and all questions have been answered.  The patient agrees to proceed as discussed.  Xylocaine with Epi 2%  subconj bleb  was used for anesthesia.  Topical betadine was used for antisepsis.  0.05 cc was  injected 3.7 mm from corneal limbus in the inferotemporal quadrant.  Following injection the IOP was less than thirty (<30) by tonopen.  The eye was then thoroughly irrigated with BSS.  Patient tolerated procedure well.  No complications were observed.  The Patient was educated that mild irritation tonight was normal secondary to topical antispsis use.  Pt was advised to call at any time day or night for pain, redness, or any decline in vision. I gave the patient my home number as well as the clinic on call number. Daily visual checks and Amsler grid testing were reviewed.  ciloxan Antibiotic Drops to be used 4 times daily for 4 days  JADIEL Coello MD  Procedure ordered: y  Consent: y  Pre auth: y  MAR:y  Opnote: y  Charge capture:y      .       ===========================

## 2019-06-19 ENCOUNTER — TELEPHONE (OUTPATIENT)
Dept: TRANSPLANT | Facility: CLINIC | Age: 74
End: 2019-06-19

## 2019-06-25 ENCOUNTER — TELEPHONE (OUTPATIENT)
Dept: TRANSPLANT | Facility: CLINIC | Age: 74
End: 2019-06-25

## 2019-07-05 RX ORDER — SODIUM BICARBONATE 325 MG/1
TABLET ORAL
Qty: 90 TABLET | Refills: 11 | Status: SHIPPED | OUTPATIENT
Start: 2019-07-05 | End: 2020-08-10 | Stop reason: SDUPTHER

## 2019-07-12 ENCOUNTER — PROCEDURE VISIT (OUTPATIENT)
Dept: OPHTHALMOLOGY | Facility: CLINIC | Age: 74
End: 2019-07-12
Payer: MEDICARE

## 2019-07-12 DIAGNOSIS — Z79.4 TYPE 2 DIABETES MELLITUS WITH STABLE PROLIFERATIVE RETINOPATHY OF BOTH EYES, WITH LONG-TERM CURRENT USE OF INSULIN: Primary | ICD-10-CM

## 2019-07-12 DIAGNOSIS — E11.3553 TYPE 2 DIABETES MELLITUS WITH STABLE PROLIFERATIVE RETINOPATHY OF BOTH EYES, WITH LONG-TERM CURRENT USE OF INSULIN: Primary | ICD-10-CM

## 2019-07-12 PROCEDURE — 92134 CPTRZ OPH DX IMG PST SGM RTA: CPT | Mod: S$GLB,TXP,, | Performed by: OPHTHALMOLOGY

## 2019-07-12 PROCEDURE — 92134 POSTERIOR SEGMENT OCT RETINA (OCULAR COHERENCE TOMOGRAPHY)-BOTH EYES: ICD-10-PCS | Mod: S$GLB,TXP,, | Performed by: OPHTHALMOLOGY

## 2019-07-12 PROCEDURE — 99499 UNLISTED E&M SERVICE: CPT | Mod: S$GLB,TXP,, | Performed by: OPHTHALMOLOGY

## 2019-07-12 PROCEDURE — 99499 NO LOS: ICD-10-PCS | Mod: S$GLB,TXP,, | Performed by: OPHTHALMOLOGY

## 2019-07-12 PROCEDURE — 67028 PR INJECT INTRAVITREAL PHARMCOLOGIC: ICD-10-PCS | Mod: LT,S$GLB,TXP, | Performed by: OPHTHALMOLOGY

## 2019-07-12 PROCEDURE — 67028 INJECTION EYE DRUG: CPT | Mod: LT,S$GLB,TXP, | Performed by: OPHTHALMOLOGY

## 2019-07-12 RX ORDER — CIPROFLOXACIN HYDROCHLORIDE 3 MG/ML
SOLUTION/ DROPS OPHTHALMIC
Qty: 5 ML | Refills: 0 | Status: SHIPPED | OUTPATIENT
Start: 2019-07-12 | End: 2019-07-19

## 2019-07-12 NOTE — PROGRESS NOTES
===============================  07/12/2019   Avril Monzon,   74 y.o. female   Last visit [facility]: :6/14/2019   Last visit eye dept. 6/14/2019  [facility]:  Corrected distance visual acuity was NLP in the right eye and 20/200 in the left eye.   Not recorded        Wearing Rx     Wearing Rx       Sphere Cylinder Axis Add    Right +0.50 +1.00 140 +3.50    Left +0.50 +1.00 140 +3.50    Type:  Bifocal               Not recorded        Chief Complaint   Patient presents with    Macular Degeneration     eylea os        HPI     Macular Degeneration      Additional comments: eylea os              Comments     Noncompliant with visits    1.) DM SINCE 1995  PDR  S/P LASER AND INJECTIONS OS(DR BOX AND AT [facility])  2.) NLP OD SINCE BIRTH  3.) DENSE CAT OD / 2+NS OS  4.) Asteroid Hyalosis  5.)iop 42 od 11/5/18  Avastin os 3/15/19  EYLEA OS # 2 6/14/19          Last edited by Elissa Alvarez on 7/12/2019 11:32 AM. (History)          ________________  7/12/2019  Problem List Items Addressed This Visit        Eye/Vision problems    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, with long-term current use of insulin - Primary    Relevant Medications    aflibercept Soln 2 mg (Start on 7/12/2019 12:30 PM)    Other Relevant Orders    Posterior Segment OCT Retina-Both eyes    Prior Authorization Order          .  7/12/2019  Diagnosis :  [dr  Today:   Eylea (afibercept) 2 mg/0.05 ml Intravitreal Injection , OS   Follow up: 1 month        Instructed to call 24/7 for any worsening of vision. Check Both eyes daily. Gave patient my home phone number.      Procedure  Note:   OS}  Eylea (afibercept) 2 mg/0.05 ml Intravitreal Injection    I have explained the Risks, Benefits and Alternatives of the procedure in detail.  The patient voices understanding and all questions have been answered.  The patient agrees to proceed as discussed.  Xylocaine with Epi 2%  subconj bleb  was used for anesthesia.  Topical betadine  was used for antisepsis.  0.05 cc was  injected 3.7 mm from corneal limbus in the inferotemporal quadrant.  Following injection the IOP was less than thirty (<30) by tonopen.  The eye was then thoroughly irrigated with BSS.  Patient tolerated procedure well.  No complications were observed.  The Patient was educated that mild irritation tonight was normal secondary to topical antispsis use.  Pt was advised to call at any time day or night for pain, redness, or any decline in vision. I gave the patient my home number as well as the clinic on call number. Daily visual checks and Amsler grid testing were reviewed.  ciloxan Antibiotic Drops to be used 4 times daily for 4 days  JADIEL Coello MD  Procedure ordered: y  Consent: y  Pre auth: y  MAR:y  Opnote: y  Charge capture:y           ===========================

## 2019-07-18 ENCOUNTER — TELEPHONE (OUTPATIENT)
Dept: OPHTHALMOLOGY | Facility: CLINIC | Age: 74
End: 2019-07-18

## 2019-07-18 NOTE — TELEPHONE ENCOUNTER
I spoke with Ms. Monzon.  She likely has a subconjunctival hemorrhage from the injection on 7/12.  No pain involved, no other symptoms, but she will text a picture of it to Dr. Coello and we will let her know then.

## 2019-07-18 NOTE — TELEPHONE ENCOUNTER
----- Message from Lalitha Albert sent at 7/18/2019 10:59 AM CDT -----  Contact: Pt  Please give pt a call at .[phone] (home) she states that her eye blood shot red.

## 2019-07-29 ENCOUNTER — LAB VISIT (OUTPATIENT)
Dept: LAB | Facility: HOSPITAL | Age: 74
End: 2019-07-29
Attending: INTERNAL MEDICINE
Payer: MEDICARE

## 2019-07-29 ENCOUNTER — OFFICE VISIT (OUTPATIENT)
Dept: HEMATOLOGY/ONCOLOGY | Facility: CLINIC | Age: 74
End: 2019-07-29
Payer: MEDICARE

## 2019-07-29 VITALS
HEIGHT: 65 IN | DIASTOLIC BLOOD PRESSURE: 70 MMHG | TEMPERATURE: 98 F | WEIGHT: 144.38 LBS | RESPIRATION RATE: 18 BRPM | SYSTOLIC BLOOD PRESSURE: 173 MMHG | HEART RATE: 71 BPM | OXYGEN SATURATION: 95 % | BODY MASS INDEX: 24.06 KG/M2

## 2019-07-29 DIAGNOSIS — D50.0 IRON DEFICIENCY ANEMIA DUE TO CHRONIC BLOOD LOSS: Primary | ICD-10-CM

## 2019-07-29 DIAGNOSIS — D63.1 ANEMIA IN STAGE 4 CHRONIC KIDNEY DISEASE: ICD-10-CM

## 2019-07-29 DIAGNOSIS — N18.4 ANEMIA IN STAGE 4 CHRONIC KIDNEY DISEASE: ICD-10-CM

## 2019-07-29 DIAGNOSIS — D50.0 IRON DEFICIENCY ANEMIA DUE TO CHRONIC BLOOD LOSS: ICD-10-CM

## 2019-07-29 LAB
ALBUMIN SERPL BCP-MCNC: 3.7 G/DL (ref 3.5–5.2)
ALP SERPL-CCNC: 101 U/L (ref 55–135)
ALT SERPL W/O P-5'-P-CCNC: 19 U/L (ref 10–44)
ANION GAP SERPL CALC-SCNC: 13 MMOL/L (ref 8–16)
AST SERPL-CCNC: 16 U/L (ref 10–40)
BASOPHILS # BLD AUTO: 0.01 K/UL (ref 0–0.2)
BASOPHILS NFR BLD: 0.2 % (ref 0–1.9)
BILIRUB SERPL-MCNC: 0.4 MG/DL (ref 0.1–1)
BUN SERPL-MCNC: 80 MG/DL (ref 8–23)
CALCIUM SERPL-MCNC: 9.4 MG/DL (ref 8.7–10.5)
CHLORIDE SERPL-SCNC: 104 MMOL/L (ref 95–110)
CO2 SERPL-SCNC: 23 MMOL/L (ref 23–29)
CREAT SERPL-MCNC: 4.8 MG/DL (ref 0.5–1.4)
DIFFERENTIAL METHOD: ABNORMAL
EOSINOPHIL # BLD AUTO: 0 K/UL (ref 0–0.5)
EOSINOPHIL NFR BLD: 0.5 % (ref 0–8)
ERYTHROCYTE [DISTWIDTH] IN BLOOD BY AUTOMATED COUNT: 11.9 % (ref 11.5–14.5)
EST. GFR  (AFRICAN AMERICAN): 10 ML/MIN/1.73 M^2
EST. GFR  (NON AFRICAN AMERICAN): 8 ML/MIN/1.73 M^2
GLUCOSE SERPL-MCNC: 273 MG/DL (ref 70–110)
HCT VFR BLD AUTO: 30.8 % (ref 37–48.5)
HGB BLD-MCNC: 10.1 G/DL (ref 12–16)
IMM GRANULOCYTES # BLD AUTO: 0.02 K/UL (ref 0–0.04)
IMM GRANULOCYTES NFR BLD AUTO: 0.3 % (ref 0–0.5)
LYMPHOCYTES # BLD AUTO: 0.9 K/UL (ref 1–4.8)
LYMPHOCYTES NFR BLD: 15.1 % (ref 18–48)
MCH RBC QN AUTO: 30.4 PG (ref 27–31)
MCHC RBC AUTO-ENTMCNC: 32.8 G/DL (ref 32–36)
MCV RBC AUTO: 93 FL (ref 82–98)
MONOCYTES # BLD AUTO: 0.3 K/UL (ref 0.3–1)
MONOCYTES NFR BLD: 5 % (ref 4–15)
NEUTROPHILS # BLD AUTO: 4.6 K/UL (ref 1.8–7.7)
NEUTROPHILS NFR BLD: 79.2 % (ref 38–73)
NRBC BLD-RTO: 0 /100 WBC
PLATELET # BLD AUTO: 202 K/UL (ref 150–350)
PMV BLD AUTO: 10.6 FL (ref 9.2–12.9)
POTASSIUM SERPL-SCNC: 4.5 MMOL/L (ref 3.5–5.1)
PROT SERPL-MCNC: 6.7 G/DL (ref 6–8.4)
RBC # BLD AUTO: 3.32 M/UL (ref 4–5.4)
SODIUM SERPL-SCNC: 140 MMOL/L (ref 136–145)
WBC # BLD AUTO: 5.78 K/UL (ref 3.9–12.7)

## 2019-07-29 PROCEDURE — 3078F PR MOST RECENT DIASTOLIC BLOOD PRESSURE < 80 MM HG: ICD-10-PCS | Mod: CPTII,S$GLB,, | Performed by: INTERNAL MEDICINE

## 2019-07-29 PROCEDURE — 83540 ASSAY OF IRON: CPT | Mod: NTX

## 2019-07-29 PROCEDURE — 99213 OFFICE O/P EST LOW 20 MIN: CPT | Mod: S$GLB,,, | Performed by: INTERNAL MEDICINE

## 2019-07-29 PROCEDURE — 36415 COLL VENOUS BLD VENIPUNCTURE: CPT | Mod: NTX

## 2019-07-29 PROCEDURE — 99999 PR PBB SHADOW E&M-EST. PATIENT-LVL IV: ICD-10-PCS | Mod: PBBFAC,,, | Performed by: INTERNAL MEDICINE

## 2019-07-29 PROCEDURE — 80053 COMPREHEN METABOLIC PANEL: CPT | Mod: NTX

## 2019-07-29 PROCEDURE — 99213 PR OFFICE/OUTPT VISIT, EST, LEVL III, 20-29 MIN: ICD-10-PCS | Mod: S$GLB,,, | Performed by: INTERNAL MEDICINE

## 2019-07-29 PROCEDURE — 3078F DIAST BP <80 MM HG: CPT | Mod: CPTII,S$GLB,, | Performed by: INTERNAL MEDICINE

## 2019-07-29 PROCEDURE — 1101F PR PT FALLS ASSESS DOC 0-1 FALLS W/OUT INJ PAST YR: ICD-10-PCS | Mod: CPTII,S$GLB,, | Performed by: INTERNAL MEDICINE

## 2019-07-29 PROCEDURE — 82728 ASSAY OF FERRITIN: CPT | Mod: TXP

## 2019-07-29 PROCEDURE — 3077F PR MOST RECENT SYSTOLIC BLOOD PRESSURE >= 140 MM HG: ICD-10-PCS | Mod: CPTII,S$GLB,, | Performed by: INTERNAL MEDICINE

## 2019-07-29 PROCEDURE — 3077F SYST BP >= 140 MM HG: CPT | Mod: CPTII,S$GLB,, | Performed by: INTERNAL MEDICINE

## 2019-07-29 PROCEDURE — 99999 PR PBB SHADOW E&M-EST. PATIENT-LVL IV: CPT | Mod: PBBFAC,,, | Performed by: INTERNAL MEDICINE

## 2019-07-29 PROCEDURE — 85025 COMPLETE CBC W/AUTO DIFF WBC: CPT | Mod: TXP

## 2019-07-29 PROCEDURE — 1101F PT FALLS ASSESS-DOCD LE1/YR: CPT | Mod: CPTII,S$GLB,, | Performed by: INTERNAL MEDICINE

## 2019-07-29 NOTE — PROGRESS NOTES
Subjective:       Patient ID: Avril Monzon is a 74 y.o. female.    Chief Complaint: Follow-up    HPI 71-year-old AA female  Who comes fo follow  up of her anemia of CRF with some elements of iron deficiency.  She is a former patient of Dr Fletcher who  since her last visit, has left  the practice and  I am seeing her today for the first time.  She has  a history of diabetes mellitus, CKD, hypertension, and dyslipidemia   Patient has significant CK D with baseline creatinine approximately 3.0.  Hemoglobin noted to be 9.3 on 6/14/16 and is trended down since 2012 when he was noted to be  11.3.  She reports fatigue, malaise, and cold intolerance, however, denies other significant symptoms.       I have reviewed and updated the HPI, ROS, PMHx, Social Hx, Family Hx and treatment history.        Today's visit:  Patient is without complaints today.             Past Medical History:   Diagnosis Date    Acute hypoxemic respiratory failure 11/26/2018    Anemia      Arthritis       back, hand, knees    Back pain      Cataract      CKD stage G4/A3, GFR 15 - 29 and albumin creatinine ratio >300 mg/g       GFR 40% Jan 2014 and 33% ub 3/2014 ([facility])    Coronary artery disease involving native coronary artery of native heart 11/30/2018    Diabetic retinopathy      DM (diabetes mellitus) type II controlled, neurological manifestation      Exudative age-related macular degeneration of left eye with active choroidal neovascularization 2/5/2019    GERD (gastroesophageal reflux disease)      Glaucoma      Heart failure      Hyperlipidemia      Hypertension      NSTEMI (non-ST elevated myocardial infarction) 11/27/2018    Peripheral neuropathy      Polyneuropathy      Proteinuria       >6 mo     Seizures       [facility] 1/2014 -dick CT [place] showed small vessel    Stroke 2012,2014    Tobacco dependence      Type 2 diabetes with peripheral circulatory disorder, controlled              Social History:  No tobacco, alcohol, or  illicit drugs     Family History: family history includes Cancer in her maternal grandmother; Diabetes in her mother; Heart disease in her mother; Hyperlipidemia in her mother; Hypertension in her mother; Muscular dystrophy in her son.       Review of Systems   Constitutional: Negative.    HENT: Negative.    Eyes: Negative.    Respiratory: Negative.  Negative for cough and wheezing.    Cardiovascular: Negative.  Negative for chest pain.   Gastrointestinal: Negative.    Genitourinary: Negative.    Neurological: Negative.    Psychiatric/Behavioral: Negative.        Objective:      Physical Exam   Constitutional: She is oriented to person, place, and time. She appears well-developed. No distress.   HENT:   Head: Normocephalic.   Right Ear: Tympanic membrane, external ear and ear canal normal.   Left Ear: Tympanic membrane, external ear and ear canal normal.   Nose: Nose normal. Right sinus exhibits no maxillary sinus tenderness and no frontal sinus tenderness. Left sinus exhibits no maxillary sinus tenderness and no frontal sinus tenderness.   Mouth/Throat: Oropharynx is clear and moist and mucous membranes are normal.   Teeth normal.  Gums normal.   Eyes: Pupils are equal, round, and reactive to light. Conjunctivae and lids are normal.   Neck: Normal carotid pulses, no hepatojugular reflux and no JVD present. Carotid bruit is not present. No tracheal deviation present. No thyroid mass and no thyromegaly present.   Cardiovascular: Normal rate, regular rhythm, S1 normal, S2 normal, normal heart sounds and intact distal pulses. Exam reveals no gallop and no friction rub.   No murmur heard.  Carotid exam normal   Pulmonary/Chest: Effort normal and breath sounds normal. No accessory muscle usage. No respiratory distress. She has no wheezes. She has no rales. She exhibits no tenderness.   Abdominal: Soft. Normal appearance. She exhibits no distension and no mass. There is no splenomegaly or hepatomegaly. There is no  tenderness. There is no rebound and no guarding.   Musculoskeletal: Normal range of motion. She exhibits no edema or tenderness.        Right hand: Normal.        Left hand: Normal.       Lymphadenopathy:     She has no cervical adenopathy.     She has no axillary adenopathy.        Right: No inguinal and no supraclavicular adenopathy present.        Left: No inguinal and no supraclavicular adenopathy present.   Neurological: She is alert and oriented to person, place, and time. She has normal strength. No cranial nerve deficit. Coordination normal.   Skin: Skin is warm and dry. No rash noted. She is not diaphoretic. No cyanosis or erythema. No pallor. Nails show no clubbing.   Psychiatric: She has a normal mood and affect. Her behavior is normal. Judgment and thought content normal.       Wt Readings from Last 3 Encounters:   07/29/19 65.5 kg (144 lb 6.4 oz)   05/07/19 65.9 kg (145 lb 4.5 oz)   05/06/19 65.2 kg (143 lb 11.8 oz)     Temp Readings from Last 3 Encounters:   07/29/19 98.2 °F (36.8 °C) (Oral)   05/21/19 97.7 °F (36.5 °C)   05/14/19 98.1 °F (36.7 °C)     BP Readings from Last 3 Encounters:   07/29/19 (!) 173/70   05/21/19 (!) 147/66   05/14/19 127/67     Pulse Readings from Last 3 Encounters:   07/29/19 71   05/21/19 66   05/14/19 66       Assessment:       1. Iron deficiency anemia due to chronic blood loss    2. Anemia in stage 4 chronic kidney disease        Plan:     she  Will have blood drawn today for a cbc/cmp/ferritin and tibc.  Will let her know the results and we will determine if she needs additional procrit or Iv iron.  ADDENDUM:  7/31/2019  Lab Results   Component Value Date    WBC 5.78 07/29/2019    HGB 10.1 (L) 07/29/2019    HCT 30.8 (L) 07/29/2019    MCV 93 07/29/2019     07/29/2019     Lab Results   Component Value Date    CREATININE 4.8 (H) 07/29/2019     Lab Results   Component Value Date    IRON 59 07/29/2019    TIBC 277 07/29/2019    FERRITIN 823 (H) 07/29/2019       No need  for either Stephaniet or me.  See us back in 2 months with a cbc and a creatinine and a nurses appointment.

## 2019-07-30 LAB — FERRITIN SERPL-MCNC: 823 NG/ML (ref 20–300)

## 2019-07-31 DIAGNOSIS — N18.4 ANEMIA IN STAGE 4 CHRONIC KIDNEY DISEASE: Primary | ICD-10-CM

## 2019-07-31 DIAGNOSIS — D63.1 ANEMIA IN STAGE 4 CHRONIC KIDNEY DISEASE: Primary | ICD-10-CM

## 2019-07-31 LAB
IRON SERPL-MCNC: 59 UG/DL (ref 30–160)
SATURATED IRON: 21 % (ref 20–50)
TOTAL IRON BINDING CAPACITY: 277 UG/DL (ref 250–450)
TRANSFERRIN SERPL-MCNC: 187 MG/DL (ref 200–375)

## 2019-08-01 ENCOUNTER — PES CALL (OUTPATIENT)
Dept: ADMINISTRATIVE | Facility: CLINIC | Age: 74
End: 2019-08-01

## 2019-09-11 ENCOUNTER — TELEPHONE (OUTPATIENT)
Dept: FAMILY MEDICINE | Facility: CLINIC | Age: 74
End: 2019-09-11

## 2019-09-11 ENCOUNTER — OFFICE VISIT (OUTPATIENT)
Dept: FAMILY MEDICINE | Facility: CLINIC | Age: 74
End: 2019-09-11
Payer: MEDICARE

## 2019-09-11 ENCOUNTER — TELEPHONE (OUTPATIENT)
Dept: CARDIOLOGY | Facility: CLINIC | Age: 74
End: 2019-09-11

## 2019-09-11 VITALS
BODY MASS INDEX: 24.53 KG/M2 | DIASTOLIC BLOOD PRESSURE: 62 MMHG | WEIGHT: 147.25 LBS | HEIGHT: 65 IN | TEMPERATURE: 98 F | HEART RATE: 70 BPM | SYSTOLIC BLOOD PRESSURE: 138 MMHG

## 2019-09-11 DIAGNOSIS — K62.5 RECTAL BLEEDING: ICD-10-CM

## 2019-09-11 DIAGNOSIS — K59.00 CONSTIPATION, UNSPECIFIED CONSTIPATION TYPE: Primary | ICD-10-CM

## 2019-09-11 PROCEDURE — 1101F PT FALLS ASSESS-DOCD LE1/YR: CPT | Mod: HCNC,CPTII,S$GLB, | Performed by: REGISTERED NURSE

## 2019-09-11 PROCEDURE — 99499 UNLISTED E&M SERVICE: CPT | Mod: HCNC,S$GLB,, | Performed by: REGISTERED NURSE

## 2019-09-11 PROCEDURE — 99999 PR PBB SHADOW E&M-EST. PATIENT-LVL IV: ICD-10-PCS | Mod: PBBFAC,HCNC,, | Performed by: REGISTERED NURSE

## 2019-09-11 PROCEDURE — G0008 FLU VACCINE - HIGH DOSE (65+) PRESERVATIVE FREE IM: ICD-10-PCS | Mod: HCNC,S$GLB,, | Performed by: REGISTERED NURSE

## 2019-09-11 PROCEDURE — 90662 FLU VACCINE - HIGH DOSE (65+) PRESERVATIVE FREE IM: ICD-10-PCS | Mod: HCNC,S$GLB,, | Performed by: REGISTERED NURSE

## 2019-09-11 PROCEDURE — 90662 IIV NO PRSV INCREASED AG IM: CPT | Mod: HCNC,S$GLB,, | Performed by: REGISTERED NURSE

## 2019-09-11 PROCEDURE — 99213 OFFICE O/P EST LOW 20 MIN: CPT | Mod: 25,HCNC,S$GLB, | Performed by: REGISTERED NURSE

## 2019-09-11 PROCEDURE — 1101F PR PT FALLS ASSESS DOC 0-1 FALLS W/OUT INJ PAST YR: ICD-10-PCS | Mod: HCNC,CPTII,S$GLB, | Performed by: REGISTERED NURSE

## 2019-09-11 PROCEDURE — G0008 ADMIN INFLUENZA VIRUS VAC: HCPCS | Mod: HCNC,S$GLB,, | Performed by: REGISTERED NURSE

## 2019-09-11 PROCEDURE — 99499 RISK ADDL DX/OHS AUDIT: ICD-10-PCS | Mod: HCNC,S$GLB,, | Performed by: REGISTERED NURSE

## 2019-09-11 PROCEDURE — 3078F DIAST BP <80 MM HG: CPT | Mod: HCNC,CPTII,S$GLB, | Performed by: REGISTERED NURSE

## 2019-09-11 PROCEDURE — 99213 PR OFFICE/OUTPT VISIT, EST, LEVL III, 20-29 MIN: ICD-10-PCS | Mod: 25,HCNC,S$GLB, | Performed by: REGISTERED NURSE

## 2019-09-11 PROCEDURE — 3075F PR MOST RECENT SYSTOLIC BLOOD PRESS GE 130-139MM HG: ICD-10-PCS | Mod: HCNC,CPTII,S$GLB, | Performed by: REGISTERED NURSE

## 2019-09-11 PROCEDURE — 3078F PR MOST RECENT DIASTOLIC BLOOD PRESSURE < 80 MM HG: ICD-10-PCS | Mod: HCNC,CPTII,S$GLB, | Performed by: REGISTERED NURSE

## 2019-09-11 PROCEDURE — 99999 PR PBB SHADOW E&M-EST. PATIENT-LVL IV: CPT | Mod: PBBFAC,HCNC,, | Performed by: REGISTERED NURSE

## 2019-09-11 PROCEDURE — 3075F SYST BP GE 130 - 139MM HG: CPT | Mod: HCNC,CPTII,S$GLB, | Performed by: REGISTERED NURSE

## 2019-09-11 RX ORDER — HYDROCORTISONE 25 MG/G
CREAM TOPICAL 2 TIMES DAILY
Qty: 30 G | Refills: 2 | Status: SHIPPED | OUTPATIENT
Start: 2019-09-11 | End: 2019-09-21

## 2019-09-11 NOTE — TELEPHONE ENCOUNTER
She is much overdue for appt so please make sure that is scheduled.  She can try 2 tums (use generic) at bedtime and see if it resolves the leg cramping.  SM

## 2019-09-11 NOTE — TELEPHONE ENCOUNTER
----- Message from Ana Norris sent at 9/11/2019  4:21 PM CDT -----  Contact: pt   Pt stated she's calling to see if the doctor can prescribe her something for leg cramps, she can be reached at [phone] Thanks         [facility] [address] [place] [id number] [facility]  [address] [facility] [address]  Phone: [phone] Fax: [phone]

## 2019-09-11 NOTE — PROGRESS NOTES
Subjective:       Patient ID: Avril Monzon is a 74 y.o. female.    Chief Complaint   Patient presents with    Rectal Bleeding       Rectal Bleeding   This is a new problem. The current episode started today (with BM). The problem has been resolved. Pertinent negatives include no abdominal pain, change in bowel habit, fatigue, myalgias, nausea, numbness, urinary symptoms, vertigo, vomiting or weakness. Exacerbated by: passing stool. She has tried nothing for the symptoms.       Review of Systems   Constitutional: Negative.  Negative for fatigue.   Respiratory: Negative.    Cardiovascular: Negative.    Gastrointestinal: Positive for anal bleeding (with wiping after passing stool, only one time this AM and none since), constipation (chronic ) and hematochezia. Negative for abdominal distention, abdominal pain, blood in stool, change in bowel habit, diarrhea, nausea, rectal pain and vomiting.   Genitourinary: Negative.         Limits oral intake/fluids to ~ 40 oz daily due to kidney problems.   Musculoskeletal: Negative.  Negative for myalgias.   Neurological: Negative.  Negative for vertigo, weakness and numbness.         Review of patient's allergies indicates:   Allergen Reactions    Codeine Swelling    Darvon [propoxyphene] Swelling         Medication List with Changes/Refills   Current Medications    ASPIRIN (ECOTRIN) 81 MG EC TABLET    Take 1 tablet (81 mg total) by mouth once daily.    ATORVASTATIN (LIPITOR) 80 MG TABLET    Take 1 tablet (80 mg total) by mouth once daily.    BLOOD SUGAR DIAGNOSTIC STRP    1 strip by Misc.(Non-Drug; Combo Route) route 3 (three) times daily. relion ultima    CALCITRIOL (ROCALTROL) 0.25 MCG CAP    Take 1 capsule (0.25 mcg total) by mouth once daily.    CLOPIDOGREL (PLAVIX) 75 MG TABLET    Take 1 tablet (75 mg total) by mouth once daily.    INSULIN NPH-INSULIN REGULAR, 70/30, (NOVOLIN 70/30) 100 UNIT/ML (70-30) INJECTION    INJECT SUBCUTANEOUSLY 30 UNITS IN THE MORNING AND  INJECT 25 UNITS IN THE EVENING    ISOSORBIDE-HYDRALAZINE 20-37.5 MG (BIDIL) 20-37.5 MG TAB    Take 2 tablets by mouth 3 (three) times daily.    LANCETS (ACCU-CHEK SOFTCLIX LANCETS) MISC    1 lancet by Misc.(Non-Drug; Combo Route) route 3 (three) times daily.    METOPROLOL TARTRATE (LOPRESSOR) 25 MG TABLET    Take 1 tablet (25 mg total) by mouth 2 (two) times daily.    NITROGLYCERIN (NITROSTAT) 0.4 MG SL TABLET    Place 1 tablet (0.4 mg total) under the tongue every 5 (five) minutes as needed for Chest pain.    SODIUM BICARBONATE 325 MG TABLET    TAKE 1 TABLET BY MOUTH THREE TIMES DAILY    TORSEMIDE (DEMADEX) 20 MG TAB    Take 2 tablets (40 mg total) by mouth once daily.       Patient Active Problem List   Diagnosis    Constipation    Hypertension associated with diabetes    Controlled type 2 diabetes mellitus with diabetic polyneuropathy, with long-term current use of insulin    Chronic kidney disease (CKD) stage G4/A3, severely decreased glomerular filtration rate (GFR) between 15-29 mL/min/1.73 square meter and albuminuria creatinine ratio greater than 300 mg/g    Proteinuria    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, with long-term current use of insulin    Cataracta brunescens of right eye    Bilateral carotid artery disease    Aortic atherosclerosis    Type 2 diabetes mellitus with circulatory disorder, with long-term current use of insulin    History of CVA (cerebrovascular accident)    Hyperparathyroidism    Vitamin D deficiency    DDD (degenerative disc disease), lumbar    Anemia in stage 4 chronic kidney disease    Iron deficiency anemia due to chronic blood loss    Diabetes mellitus with stage 4 chronic kidney disease GFR 15-29    Hyperlipidemia associated with type 2 diabetes mellitus    Acute hypoxemic respiratory failure secondary to acute decompensated HF    NSTEMI (non-ST elevated myocardial infarction)    GREGG on CKD, stage IV    Non-ST  "elevation MI (NSTEMI)    Pulmonary hypertension    Stage 5 chronic kidney disease    Heart failure    Shortness of breath    Diabetic nephropathy associated with type 2 diabetes mellitus    Coronary artery disease involving native coronary artery of native heart    Chronic combined systolic and diastolic congestive heart failure    Exudative age-related macular degeneration of left eye with active choroidal neovascularization    Dryness, eye    Other headache syndrome    Accelerated hypertension with diastolic congestive heart failure, NYHA class 3    Non-rheumatic mitral regurgitation    Non-rheumatic tricuspid valve insufficiency    Age-related nuclear cataract of left eye    Anemia due to stage 5 chronic kidney disease         Past medical, surgical, family and social histories have been reviewed today.        Objective:     Vitals:    09/11/19 1406   BP: 138/62   Pulse: 70   Temp: 97.9 °F (36.6 °C)   Weight: 66.8 kg (147 lb 4.3 oz)   Height: 5' 5" (1.651 m)   PainSc: 0-No pain         Physical Exam   Constitutional: She is oriented to person, place, and time. She appears well-developed and well-nourished. No distress.   Genitourinary:   Genitourinary Comments: Declined exam   Neurological: She is alert and oriented to person, place, and time.   Skin: She is not diaphoretic.   Psychiatric: She has a normal mood and affect. Her behavior is normal. Judgment and thought content normal.   Vitals reviewed.        Diagnosis       1. Constipation, unspecified constipation type    2. Rectal bleeding          Assessment/ Plan     Constipation, unspecified constipation type  · Preventative measures discussed.  · Stool softener.    Rectal bleeding  -     hydrocortisone (ANUSOL-HC) 2.5 % rectal cream; Place rectally 2 (two) times daily. for 10 days  Dispense: 30 g; Refill: 2      Flu shot today.  Monitor for now.  Contact office back if s/s worsen or persist.  Follow-up in clinic as needed.      Future " Appointments   Date Time Provider Department Center   10/2/2019  9:35 AM LABORATORY, [facility] LAB [place]   10/2/2019 10:00 AM Magnus Oseguera MD [facility] HEM ONC [place]   10/8/2019  3:00 PM Leonard Bryan MD [facility]   10/8/2019  4:20 PM Rose Germain MD [facility]       Patient Care Team:  Rose Germain MD as PCP - General (Internal Medicine)  Lety Mcgill LPN as Care Coordinator (Internal Medicine)  Conchita Garcia RD, CDE as Dietitian (Diabetes)      SANCHEZ Salvador  [facility]

## 2019-09-11 NOTE — TELEPHONE ENCOUNTER
"Pt states that she has been having cramps in her legs. She stated it's "pan horses"   All of the leg areas are affected.   Has not started or stopped any new meds.  This has been happening for several months.  She is requesting an rx to help.  Please advise./rpr  "

## 2019-09-12 NOTE — TELEPHONE ENCOUNTER
Instructed pt to have 2 tums at bedtime and let Dr. Germain know if it doesn't help.  Appt scheduled 10/19./rpr

## 2019-09-19 ENCOUNTER — DOCUMENTATION ONLY (OUTPATIENT)
Dept: HEMATOLOGY/ONCOLOGY | Facility: CLINIC | Age: 74
End: 2019-09-19

## 2019-09-19 NOTE — PROGRESS NOTES
Nurse called to notify pt of her appointment location changed, no answer unable to leave voice message.

## 2019-10-01 ENCOUNTER — PATIENT OUTREACH (OUTPATIENT)
Dept: ADMINISTRATIVE | Facility: HOSPITAL | Age: 74
End: 2019-10-01

## 2019-10-01 DIAGNOSIS — Z12.39 SCREENING FOR MALIGNANT NEOPLASM OF BREAST: ICD-10-CM

## 2019-10-01 DIAGNOSIS — Z12.11 SCREEN FOR COLON CANCER: Primary | ICD-10-CM

## 2019-10-01 NOTE — PROGRESS NOTES
Attempt to contact patient. Granddaughter answered the phone stating  was not it at the time and the mobile number listed belongs to the granddaughter. Mammogram and Colonoscopy ordered [facility]>

## 2019-10-03 ENCOUNTER — TELEPHONE (OUTPATIENT)
Dept: ENDOSCOPY | Facility: HOSPITAL | Age: 74
End: 2019-10-03

## 2019-10-07 ENCOUNTER — HOSPITAL ENCOUNTER (EMERGENCY)
Facility: HOSPITAL | Age: 74
Discharge: HOME | End: 2019-10-07
Attending: EMERGENCY MEDICINE
Payer: MEDICARE

## 2019-10-07 VITALS
SYSTOLIC BLOOD PRESSURE: 190 MMHG | DIASTOLIC BLOOD PRESSURE: 97 MMHG | TEMPERATURE: 99 F | WEIGHT: 148.94 LBS | RESPIRATION RATE: 17 BRPM | BODY MASS INDEX: 23.94 KG/M2 | OXYGEN SATURATION: 95 % | HEART RATE: 70 BPM | HEIGHT: 66 IN

## 2019-10-07 DIAGNOSIS — R51.9 ACUTE NONINTRACTABLE HEADACHE, UNSPECIFIED HEADACHE TYPE: ICD-10-CM

## 2019-10-07 DIAGNOSIS — I10 HYPERTENSION, UNSPECIFIED TYPE: Primary | ICD-10-CM

## 2019-10-07 LAB
ANION GAP SERPL CALC-SCNC: 12 MMOL/L (ref 8–16)
BASOPHILS # BLD AUTO: 0.01 K/UL (ref 0–0.2)
BASOPHILS NFR BLD: 0.2 % (ref 0–1.9)
BUN SERPL-MCNC: 63 MG/DL (ref 8–23)
CALCIUM SERPL-MCNC: 9.7 MG/DL (ref 8.7–10.5)
CHLORIDE SERPL-SCNC: 105 MMOL/L (ref 95–110)
CO2 SERPL-SCNC: 22 MMOL/L (ref 23–29)
CREAT SERPL-MCNC: 4.2 MG/DL (ref 0.5–1.4)
DIFFERENTIAL METHOD: ABNORMAL
EOSINOPHIL # BLD AUTO: 0.1 K/UL (ref 0–0.5)
EOSINOPHIL NFR BLD: 1.1 % (ref 0–8)
ERYTHROCYTE [DISTWIDTH] IN BLOOD BY AUTOMATED COUNT: 12.1 % (ref 11.5–14.5)
EST. GFR  (AFRICAN AMERICAN): 11 ML/MIN/1.73 M^2
EST. GFR  (NON AFRICAN AMERICAN): 10 ML/MIN/1.73 M^2
GLUCOSE SERPL-MCNC: 212 MG/DL (ref 70–110)
HCT VFR BLD AUTO: 28.3 % (ref 37–48.5)
HCV AB SERPL QL IA: NEGATIVE
HGB BLD-MCNC: 8.9 G/DL (ref 12–16)
IMM GRANULOCYTES # BLD AUTO: 0.02 K/UL (ref 0–0.04)
IMM GRANULOCYTES NFR BLD AUTO: 0.4 % (ref 0–0.5)
LYMPHOCYTES # BLD AUTO: 0.5 K/UL (ref 1–4.8)
LYMPHOCYTES NFR BLD: 9.3 % (ref 18–48)
MCH RBC QN AUTO: 29.5 PG (ref 27–31)
MCHC RBC AUTO-ENTMCNC: 31.4 G/DL (ref 32–36)
MCV RBC AUTO: 94 FL (ref 82–98)
MONOCYTES # BLD AUTO: 0.3 K/UL (ref 0.3–1)
MONOCYTES NFR BLD: 6 % (ref 4–15)
NEUTROPHILS # BLD AUTO: 4.6 K/UL (ref 1.8–7.7)
NEUTROPHILS NFR BLD: 83 % (ref 38–73)
NRBC BLD-RTO: 0 /100 WBC
PLATELET # BLD AUTO: 172 K/UL (ref 150–350)
PMV BLD AUTO: 10.2 FL (ref 9.2–12.9)
POTASSIUM SERPL-SCNC: 4.6 MMOL/L (ref 3.5–5.1)
RBC # BLD AUTO: 3.02 M/UL (ref 4–5.4)
SODIUM SERPL-SCNC: 139 MMOL/L (ref 136–145)
WBC # BLD AUTO: 5.5 K/UL (ref 3.9–12.7)

## 2019-10-07 PROCEDURE — 63600175 PHARM REV CODE 636 W HCPCS: Mod: HCNC | Performed by: EMERGENCY MEDICINE

## 2019-10-07 PROCEDURE — 96374 THER/PROPH/DIAG INJ IV PUSH: CPT | Mod: HCNC

## 2019-10-07 PROCEDURE — 85025 COMPLETE CBC W/AUTO DIFF WBC: CPT | Mod: HCNC

## 2019-10-07 PROCEDURE — 25000003 PHARM REV CODE 250: Mod: HCNC | Performed by: EMERGENCY MEDICINE

## 2019-10-07 PROCEDURE — 99284 EMERGENCY DEPT VISIT MOD MDM: CPT | Mod: 25,HCNC

## 2019-10-07 PROCEDURE — 80048 BASIC METABOLIC PNL TOTAL CA: CPT | Mod: HCNC

## 2019-10-07 PROCEDURE — 96375 TX/PRO/DX INJ NEW DRUG ADDON: CPT | Mod: 59,HCNC

## 2019-10-07 PROCEDURE — 86803 HEPATITIS C AB TEST: CPT | Mod: HCNC

## 2019-10-07 RX ORDER — METOPROLOL TARTRATE 25 MG/1
25 TABLET, FILM COATED ORAL
Status: COMPLETED | OUTPATIENT
Start: 2019-10-07 | End: 2019-10-07

## 2019-10-07 RX ORDER — MORPHINE SULFATE 4 MG/ML
3 INJECTION, SOLUTION INTRAMUSCULAR; INTRAVENOUS
Status: COMPLETED | OUTPATIENT
Start: 2019-10-07 | End: 2019-10-07

## 2019-10-07 RX ORDER — ISOSORBIDE DINITRATE AND HYDRALAZINE HYDROCHLORIDE 37.5; 2 MG/1; MG/1
2 TABLET ORAL
Status: COMPLETED | OUTPATIENT
Start: 2019-10-07 | End: 2019-10-07

## 2019-10-07 RX ORDER — METOCLOPRAMIDE HYDROCHLORIDE 5 MG/ML
10 INJECTION INTRAMUSCULAR; INTRAVENOUS
Status: COMPLETED | OUTPATIENT
Start: 2019-10-07 | End: 2019-10-07

## 2019-10-07 RX ORDER — ACETAMINOPHEN 500 MG
1000 TABLET ORAL
Status: COMPLETED | OUTPATIENT
Start: 2019-10-07 | End: 2019-10-07

## 2019-10-07 RX ADMIN — METOPROLOL TARTRATE 25 MG: 25 TABLET ORAL at 03:10

## 2019-10-07 RX ADMIN — MORPHINE SULFATE 3 MG: 4 INJECTION, SOLUTION INTRAMUSCULAR; INTRAVENOUS at 05:10

## 2019-10-07 RX ADMIN — METOCLOPRAMIDE 10 MG: 5 INJECTION, SOLUTION INTRAMUSCULAR; INTRAVENOUS at 03:10

## 2019-10-07 RX ADMIN — ACETAMINOPHEN 1000 MG: 500 TABLET ORAL at 03:10

## 2019-10-07 RX ADMIN — HYDRALAZINE HYDROCHLORIDE AND ISOSORBIDE DINITRATE 2 TABLET: 37.5; 2 TABLET, FILM COATED ORAL at 03:10

## 2019-10-07 NOTE — ED PROVIDER NOTES
SCRIBE #1 NOTE: I, Maricel Pat, am scribing for, and in the presence of, Tavo Root MD. I have scribed the entire note.       History     Chief Complaint   Patient presents with    Hypertension     pt reports elevated BP of 239/85 at home and a headache     Review of patient's allergies indicates:   Allergen Reactions    Codeine Swelling    Darvon [propoxyphene] Swelling         History of Present Illness     HPI    10/7/2019, 2:55 AM  History obtained from the patient      History of Present Illness: Avril Monzon is a 74 y.o. female patient with a PMHx of anemia, CKD, DM, CAD, CHF, HTN, HLD, NSTEMI, and s/p cardiac stent who presents to the Emergency Department for evaluation of HTN (239/85) which onset suddenly last night between 9-10 PM. Pt c/o a diffuse HA that has dissipated slightly upon arrival to ED. Pt states that she missed her nighttime HTN medication last night (Metoprolol and Bidil). Pt's last dose of her HTN medication was yesterday at noon. Pt reports that she has had similar sx 6 months ago. Symptoms are constant and moderate in severity. No mitigating or exacerbating factors reported. Associated sxs include HA. Patient denies any CP, SOB, palpations, wheezing, fever/ chills, cough, congestion, dysuria, hematuria, abdominal pain, n/v, numbness, speech difficulty, dizziness, localized weakness, back/ neck pain, and all other sxs at this time. Prior Tx includes apple cider vinegar. No further complaints or concerns at this time.     Arrival mode: Personal vehicle     PCP: Rose Parks MD        Past Medical History:  Past Medical History:   Diagnosis Date    Acute hypoxemic respiratory failure 11/26/2018    Anemia     Arthritis     back, hand, knees    Back pain     Cataract     CKD stage G4/A3, GFR 15 - 29 and albumin creatinine ratio >300 mg/g     GFR 40% Jan 2014 and 33% ub 3/2014 ([facility])    Coronary artery disease involving native coronary artery of native heart 11/30/2018     Diabetic retinopathy     DM (diabetes mellitus) type II controlled, neurological manifestation     Exudative age-related macular degeneration of left eye with active choroidal neovascularization 2/5/2019    GERD (gastroesophageal reflux disease)     Glaucoma     Heart failure     Hyperlipidemia     Hypertension     NSTEMI (non-ST elevated myocardial infarction) 11/27/2018    Peripheral neuropathy     Polyneuropathy     Proteinuria     >6 mo     Seizures     [facility] 1/2014 -dick CT [facility] showed small vessel    Stroke 2012,2014    Tobacco dependence     Type 2 diabetes with peripheral circulatory disorder, controlled        Past Surgical History:  Past Surgical History:   Procedure Laterality Date    BREAST LUMPECTOMY Left     benign, when patient was 15 years old    BREAST MASS EXCISION      ,benign, age 15.    CATHETERIZATION OF BOTH LEFT AND RIGHT HEART N/A 11/28/2018    Procedure: CATHETERIZATION, HEART, BOTH LEFT AND RIGHT;  Surgeon: Michelle Holman MD;  Location: [facility] CATH LAB;  Service: Cardiology;  Laterality: N/A;    CATHETERIZATION OF BOTH LEFT AND RIGHT HEART N/A 11/30/2018    Procedure: CATHETERIZATION, HEART, BOTH LEFT AND RIGHT;  Surgeon: Michelle Holman MD;  Location: [facility] CATH LAB;  Service: Cardiology;  Laterality: N/A;    HYSTERECTOMY  1982    LEFT HEART CATHETERIZATION Left 12/3/2018    Procedure: CATHETERIZATION, HEART, LEFT;  Surgeon: Michelle Holman MD;  Location: [facility] CATH LAB;  Service: Cardiology;  Laterality: Left;  LAD ATHERECTOMY STENT/ FEMORAL APPROACH    OOPHORECTOMY      wrist cyst Right 1980s    dorsal wrist cyst         Family History:  Family History   Problem Relation Age of Onset    Diabetes Mother     Hyperlipidemia Mother     Hypertension Mother     Heart disease Mother         MI    Muscular dystrophy Son     Cancer Maternal Grandmother         colon       Social History:  Social History     Tobacco Use    Smoking status: Former Smoker     Packs/day:  1.50     Years: 30.00     Pack years: 45.00     Types: Cigarettes     Last attempt to quit: 1990     Years since quittin.7    Smokeless tobacco: Former User   Substance and Sexual Activity    Alcohol use: No     Alcohol/week: 0.0 standard drinks    Drug use: No    Sexual activity: Never        Review of Systems     Review of Systems   Constitutional: Negative for chills and fever.   HENT: Negative for congestion and sore throat.    Respiratory: Negative for cough, shortness of breath and wheezing.    Cardiovascular: Negative for chest pain and palpitations.        + HTN   Gastrointestinal: Negative for abdominal pain, nausea and vomiting.   Genitourinary: Negative for dysuria and hematuria.   Musculoskeletal: Negative for back pain and neck pain.   Skin: Negative for rash.   Neurological: Positive for headaches. Negative for dizziness, speech difficulty, weakness (localized) and numbness.   Hematological: Does not bruise/bleed easily.   All other systems reviewed and are negative.     Physical Exam     Initial Vitals [10/07/19 0230]   BP Pulse Resp Temp SpO2   (!) 216/95 75 20 98.8 °F (37.1 °C) 96 %      MAP       --          Physical Exam  Nursing Notes and Vital Signs Reviewed.   Constitutional: Patient is in no acute distress. Well-developed and well-nourished.  Head: Atraumatic. Normocephalic.  Eyes: PERRL. EOM intact. Conjunctivae are not pale. No scleral icterus.  ENT: Mucous membranes are moist. Oropharynx is clear and symmetric.    Neck: Supple. Full ROM. No lymphadenopathy.  Cardiovascular: Regular rate. Regular rhythm. No murmurs, rubs, or gallops. Distal pulses are 2+ and symmetric.  Pulmonary/Chest: No respiratory distress. Clear to auscultation bilaterally. No wheezing or rales.  Abdominal: Soft and non-distended.  There is no tenderness.  No rebound, guarding, or rigidity. Good bowel sounds.  Genitourinary: No CVA tenderness  Musculoskeletal: Moves all extremities. No obvious deformities.  "No edema. No calf tenderness.  Skin: Warm and dry.  Neurological: Patient is alert and oriented to person, place and time. Pupils ERRL and EOM normal. Cranial nerves II-XII are intact. Strength is full bilaterally; it is equal and 5/5 in bilateral upper and lower extremities. There is no pronator drift of outstretched arms. Light touch sense is intact. Speech is clear and normal. No acute focal neurological deficits noted.  Psychiatric: Normal affect. Good eye contact. Appropriate in content.     ED Course   Procedures  ED Vital Signs:  Vitals:    10/07/19 0230 10/07/19 0335 10/07/19 0405 10/07/19 0515   BP: (!) 216/95 (!) 186/76 (!) 195/84 (!) 182/77   Pulse: 75 72 70 70   Resp: 20 19 17 18   Temp: 98.8 °F (37.1 °C)      TempSrc: Oral      SpO2: 96% 95% 95% 95%   Weight: 67.5 kg (148 lb 14.7 oz)      Height: 5' 6" (1.676 m)       10/07/19 0538 10/07/19 0606   BP: (!) 216/86 (!) 190/97   Pulse: 71 70   Resp:  17   Temp:     TempSrc:     SpO2: 96% 95%   Weight:     Height:         Abnormal Lab Results:  Labs Reviewed   CBC W/ AUTO DIFFERENTIAL - Abnormal; Notable for the following components:       Result Value    RBC 3.02 (*)     Hemoglobin 8.9 (*)     Hematocrit 28.3 (*)     Mean Corpuscular Hemoglobin Conc 31.4 (*)     Lymph # 0.5 (*)     Gran% 83.0 (*)     Lymph% 9.3 (*)     All other components within normal limits   BASIC METABOLIC PANEL - Abnormal; Notable for the following components:    CO2 22 (*)     Glucose 212 (*)     BUN, Bld 63 (*)     Creatinine 4.2 (*)     eGFR if  11 (*)     eGFR if non  10 (*)     All other components within normal limits   HEPATITIS C ANTIBODY        All Lab Results:  Results for orders placed or performed during the hospital encounter of 10/07/19   Hepatitis C antibody   Result Value Ref Range    Hepatitis C Ab Negative Negative   CBC auto differential   Result Value Ref Range    WBC 5.50 3.90 - 12.70 K/uL    RBC 3.02 (L) 4.00 - 5.40 M/uL    " Hemoglobin 8.9 (L) 12.0 - 16.0 g/dL    Hematocrit 28.3 (L) 37.0 - 48.5 %    Mean Corpuscular Volume 94 82 - 98 fL    Mean Corpuscular Hemoglobin 29.5 27.0 - 31.0 pg    Mean Corpuscular Hemoglobin Conc 31.4 (L) 32.0 - 36.0 g/dL    RDW 12.1 11.5 - 14.5 %    Platelets 172 150 - 350 K/uL    MPV 10.2 9.2 - 12.9 fL    Immature Granulocytes 0.4 0.0 - 0.5 %    Gran # (ANC) 4.6 1.8 - 7.7 K/uL    Immature Grans (Abs) 0.02 0.00 - 0.04 K/uL    Lymph # 0.5 (L) 1.0 - 4.8 K/uL    Mono # 0.3 0.3 - 1.0 K/uL    Eos # 0.1 0.0 - 0.5 K/uL    Baso # 0.01 0.00 - 0.20 K/uL    nRBC 0 0 /100 WBC    Gran% 83.0 (H) 38.0 - 73.0 %    Lymph% 9.3 (L) 18.0 - 48.0 %    Mono% 6.0 4.0 - 15.0 %    Eosinophil% 1.1 0.0 - 8.0 %    Basophil% 0.2 0.0 - 1.9 %    Differential Method Automated    Basic metabolic panel   Result Value Ref Range    Sodium 139 136 - 145 mmol/L    Potassium 4.6 3.5 - 5.1 mmol/L    Chloride 105 95 - 110 mmol/L    CO2 22 (L) 23 - 29 mmol/L    Glucose 212 (H) 70 - 110 mg/dL    BUN, Bld 63 (H) 8 - 23 mg/dL    Creatinine 4.2 (H) 0.5 - 1.4 mg/dL    Calcium 9.7 8.7 - 10.5 mg/dL    Anion Gap 12 8 - 16 mmol/L    eGFR if African American 11 (A) >60 mL/min/1.73 m^2    eGFR if non African American 10 (A) >60 mL/min/1.73 m^2         Imaging Results:  Imaging Results          CT Head Without Contrast (Final result)  Result time 10/07/19 07:08:45    Final result by Sebastien Trent MD (10/07/19 07:08:45)                 Impression:      Chronic microvascular ischemic changes.      Electronically signed by: Sebastien Trent MD  Date:    10/07/2019  Time:    07:08             Narrative:    EXAMINATION:  CT HEAD WITHOUT CONTRAST    CLINICAL HISTORY:  hypertensive emergency eval for headache;    TECHNIQUE:  Low dose axial CT images obtained throughout the head without intravenous contrast. Sagittal and coronal reconstructions were performed.  All CT scans at this facility use dose modulation, iterative reconstruction and/or weight based dosing when  appropriate to reduce radiation dose to as low as reasonably achievable.    COMPARISON:  None.    FINDINGS:  Intracranial compartment: Fairly abundant calcification within the basal ganglia bilaterally which may be normal for age however can be seen with metabolic processes.    The brain parenchyma demonstrates areas of decreased attenuation with mild to moderate periventricular white matter consistent with chronic microvascular ischemic changes..  No parenchymal mass, hemorrhage, edema or major vascular distribution infarct.  Vascular calcifications are noted.    Mild prominence of the sulci and ventricles are consistent with age-related involutional changes.    No extra-axial blood or fluid collections.    Skull/extracranial contents (limited evaluation): No fracture.  Postoperative changes are seen within the right globe mastoid air cells and paranasal sinuses are essentially clear.                               4:48 AM: CT Head:  Radiologist: Dr. Celena Wren  IMPRESSION: No ICH, mass effect or edema. No evidence of acute cortical stroke. Visualized sinuses and mastoid air cells are clear.          The Emergency Provider reviewed the vital signs and test results, which are outlined above.     ED Discussion     5:15 AM: Reassessed pt at this time.  Pt states her condition has improved at this time. Discussed with pt all pertinent ED information and results. Discussed pt dx and plan of tx. Gave pt all f/u and return to the ED instructions. All questions and concerns were addressed at this time. Pt expresses understanding of information and instructions, and is comfortable with plan to discharge. Pt is stable for discharge.    Patient's headache is either consistent with previous headache and/or lacks features concerning for emergent or life threatening condition.  I do not suspect SAH, meningitis, increased IC pressure, infectious, toxic, vascular, CNS, or other EMC.  I have discussed this at length with  patient and/or family/caretaker.    I discussed with patient and/or family/caretaker that evaluation in the ED does not suggest any emergent or life threatening medical conditions requiring immediate intervention beyond what was provided in the ED, and I believe patient is safe for discharge.  Regardless, an unremarkable evaluation in the ED does not preclude the development or presence of a serious of life threatening condition. As such, patient was instructed to return immediately for any worsening or change in current symptoms.         Medical Decision Making:   Clinical Tests:   Lab Tests: Ordered and Reviewed  Radiological Study: Ordered and Reviewed           ED Medication(s):  Medications   metoclopramide HCl injection 10 mg (10 mg Intravenous Given 10/7/19 0321)   isosorbide-hydrALAZINE 20-37.5 mg per tablet 2 tablet (2 tablets Oral Given 10/7/19 0319)   metoprolol tartrate (LOPRESSOR) tablet 25 mg (25 mg Oral Given 10/7/19 0320)   acetaminophen tablet 1,000 mg (1,000 mg Oral Given 10/7/19 0319)   morphine injection 3 mg (3 mg Intravenous Given 10/7/19 0532)       Discharge Medication List as of 10/7/2019  5:21 AM                  Scribe Attestation:   Scribe #1: I performed the above scribed service and the documentation accurately describes the services I performed. I attest to the accuracy of the note.     Attending:   Physician Attestation Statement for Scribe #1: I, Tavo Root MD, personally performed the services described in this documentation, as scribed by Maricel Pat, in my presence, and it is both accurate and complete.           Clinical Impression       ICD-10-CM ICD-9-CM   1. Hypertension, unspecified type I10 401.9   2. Acute nonintractable headache, unspecified headache type R51 784.0       Disposition:   Disposition: Discharged  Condition: Stable         Tavo Root MD  10/07/19 [id number]

## 2019-11-05 ENCOUNTER — TELEPHONE (OUTPATIENT)
Dept: OPHTHALMOLOGY | Facility: CLINIC | Age: 74
End: 2019-11-05

## 2019-11-05 ENCOUNTER — TELEPHONE (OUTPATIENT)
Dept: HEMATOLOGY/ONCOLOGY | Facility: CLINIC | Age: 74
End: 2019-11-05

## 2019-11-05 NOTE — TELEPHONE ENCOUNTER
----- Message from Dawn Vasquez sent at 11/5/2019  1:30 PM CST -----  Contact: self- [phone]  Pt would like a call back to schedule an appt. Please call back at [phone].       Thank You,   Dawn Vasquez

## 2019-11-05 NOTE — TELEPHONE ENCOUNTER
----- Message from Dawn Vasquez sent at 11/5/2019  1:32 PM CST -----  Contact: Self- [phone]  Pt would like to know when is her next blood infusion. Please call back at [phone].       Thank you,   Dawn Vasquez

## 2019-11-06 ENCOUNTER — HOSPITAL ENCOUNTER (EMERGENCY)
Facility: HOSPITAL | Age: 74
Discharge: HOME | End: 2019-11-06
Attending: EMERGENCY MEDICINE
Payer: MEDICARE

## 2019-11-06 VITALS
RESPIRATION RATE: 18 BRPM | OXYGEN SATURATION: 99 % | BODY MASS INDEX: 23.13 KG/M2 | SYSTOLIC BLOOD PRESSURE: 205 MMHG | TEMPERATURE: 99 F | HEART RATE: 68 BPM | DIASTOLIC BLOOD PRESSURE: 89 MMHG | WEIGHT: 143.31 LBS

## 2019-11-06 DIAGNOSIS — I10 HYPERTENSION: ICD-10-CM

## 2019-11-06 DIAGNOSIS — I73.9 PVD (PERIPHERAL VASCULAR DISEASE): Primary | ICD-10-CM

## 2019-11-06 LAB
ALBUMIN SERPL BCP-MCNC: 3.8 G/DL (ref 3.5–5.2)
ALP SERPL-CCNC: 88 U/L (ref 55–135)
ALT SERPL W/O P-5'-P-CCNC: 11 U/L (ref 10–44)
ANION GAP SERPL CALC-SCNC: 14 MMOL/L (ref 8–16)
AST SERPL-CCNC: 15 U/L (ref 10–40)
BACTERIA #/AREA URNS HPF: ABNORMAL /HPF
BASOPHILS # BLD AUTO: 0.01 K/UL (ref 0–0.2)
BASOPHILS NFR BLD: 0.2 % (ref 0–1.9)
BILIRUB SERPL-MCNC: 0.3 MG/DL (ref 0.1–1)
BILIRUB UR QL STRIP: NEGATIVE
BUN SERPL-MCNC: 54 MG/DL (ref 8–23)
CALCIUM SERPL-MCNC: 10 MG/DL (ref 8.7–10.5)
CHLORIDE SERPL-SCNC: 103 MMOL/L (ref 95–110)
CK SERPL-CCNC: 207 U/L (ref 20–180)
CLARITY UR: CLEAR
CO2 SERPL-SCNC: 24 MMOL/L (ref 23–29)
COLOR UR: YELLOW
CREAT SERPL-MCNC: 4.5 MG/DL (ref 0.5–1.4)
DIFFERENTIAL METHOD: ABNORMAL
EOSINOPHIL # BLD AUTO: 0.1 K/UL (ref 0–0.5)
EOSINOPHIL NFR BLD: 0.8 % (ref 0–8)
ERYTHROCYTE [DISTWIDTH] IN BLOOD BY AUTOMATED COUNT: 11.3 % (ref 11.5–14.5)
EST. GFR  (AFRICAN AMERICAN): 11 ML/MIN/1.73 M^2
EST. GFR  (NON AFRICAN AMERICAN): 9 ML/MIN/1.73 M^2
GLUCOSE SERPL-MCNC: 135 MG/DL (ref 70–110)
GLUCOSE UR QL STRIP: ABNORMAL
HCT VFR BLD AUTO: 30.1 % (ref 37–48.5)
HGB BLD-MCNC: 10.2 G/DL (ref 12–16)
HGB UR QL STRIP: ABNORMAL
HYALINE CASTS #/AREA URNS LPF: 0 /LPF
IMM GRANULOCYTES # BLD AUTO: 0.02 K/UL (ref 0–0.04)
IMM GRANULOCYTES NFR BLD AUTO: 0.3 % (ref 0–0.5)
KETONES UR QL STRIP: NEGATIVE
LEUKOCYTE ESTERASE UR QL STRIP: NEGATIVE
LYMPHOCYTES # BLD AUTO: 0.7 K/UL (ref 1–4.8)
LYMPHOCYTES NFR BLD: 12.3 % (ref 18–48)
MAGNESIUM SERPL-MCNC: 1.9 MG/DL (ref 1.6–2.6)
MCH RBC QN AUTO: 30.3 PG (ref 27–31)
MCHC RBC AUTO-ENTMCNC: 33.9 G/DL (ref 32–36)
MCV RBC AUTO: 89 FL (ref 82–98)
MICROSCOPIC COMMENT: ABNORMAL
MONOCYTES # BLD AUTO: 0.4 K/UL (ref 0.3–1)
MONOCYTES NFR BLD: 7 % (ref 4–15)
NEUTROPHILS # BLD AUTO: 4.8 K/UL (ref 1.8–7.7)
NEUTROPHILS NFR BLD: 79.4 % (ref 38–73)
NITRITE UR QL STRIP: NEGATIVE
NRBC BLD-RTO: 0 /100 WBC
PH UR STRIP: 7 [PH] (ref 5–8)
PLATELET # BLD AUTO: 168 K/UL (ref 150–350)
PMV BLD AUTO: 10.8 FL (ref 9.2–12.9)
POTASSIUM SERPL-SCNC: 3.9 MMOL/L (ref 3.5–5.1)
PROT SERPL-MCNC: 7.1 G/DL (ref 6–8.4)
PROT UR QL STRIP: ABNORMAL
RBC # BLD AUTO: 3.37 M/UL (ref 4–5.4)
RBC #/AREA URNS HPF: 1 /HPF (ref 0–4)
SODIUM SERPL-SCNC: 141 MMOL/L (ref 136–145)
SP GR UR STRIP: 1.01 (ref 1–1.03)
SQUAMOUS #/AREA URNS HPF: 1 /HPF
URN SPEC COLLECT METH UR: ABNORMAL
UROBILINOGEN UR STRIP-ACNC: NEGATIVE EU/DL
WBC # BLD AUTO: 6.04 K/UL (ref 3.9–12.7)
WBC #/AREA URNS HPF: 1 /HPF (ref 0–5)
YEAST URNS QL MICRO: ABNORMAL

## 2019-11-06 PROCEDURE — 81000 URINALYSIS NONAUTO W/SCOPE: CPT | Mod: HCNC

## 2019-11-06 PROCEDURE — 36415 COLL VENOUS BLD VENIPUNCTURE: CPT | Mod: HCNC

## 2019-11-06 PROCEDURE — 85025 COMPLETE CBC W/AUTO DIFF WBC: CPT | Mod: HCNC

## 2019-11-06 PROCEDURE — 93010 EKG 12-LEAD: ICD-10-PCS | Mod: HCNC,,, | Performed by: INTERNAL MEDICINE

## 2019-11-06 PROCEDURE — 83735 ASSAY OF MAGNESIUM: CPT | Mod: HCNC

## 2019-11-06 PROCEDURE — 93005 ELECTROCARDIOGRAM TRACING: CPT | Mod: HCNC

## 2019-11-06 PROCEDURE — 99284 EMERGENCY DEPT VISIT MOD MDM: CPT | Mod: 25,HCNC

## 2019-11-06 PROCEDURE — 82550 ASSAY OF CK (CPK): CPT | Mod: HCNC

## 2019-11-06 PROCEDURE — 25000003 PHARM REV CODE 250: Mod: HCNC | Performed by: EMERGENCY MEDICINE

## 2019-11-06 PROCEDURE — 80053 COMPREHEN METABOLIC PANEL: CPT | Mod: HCNC

## 2019-11-06 PROCEDURE — 93010 ELECTROCARDIOGRAM REPORT: CPT | Mod: HCNC,,, | Performed by: INTERNAL MEDICINE

## 2019-11-06 RX ORDER — ISOSORBIDE DINITRATE AND HYDRALAZINE HYDROCHLORIDE 37.5; 2 MG/1; MG/1
1 TABLET ORAL ONCE
Status: COMPLETED | OUTPATIENT
Start: 2019-11-06 | End: 2019-11-06

## 2019-11-06 RX ORDER — METOPROLOL TARTRATE 25 MG/1
25 TABLET, FILM COATED ORAL ONCE
Status: COMPLETED | OUTPATIENT
Start: 2019-11-06 | End: 2019-11-06

## 2019-11-06 RX ORDER — ROPINIROLE 0.25 MG/1
0.25 TABLET, FILM COATED ORAL ONCE
Status: COMPLETED | OUTPATIENT
Start: 2019-11-06 | End: 2019-11-06

## 2019-11-06 RX ADMIN — HYDRALAZINE HYDROCHLORIDE AND ISOSORBIDE DINITRATE 1 TABLET: 37.5; 2 TABLET, FILM COATED ORAL at 08:11

## 2019-11-06 RX ADMIN — ROPINIROLE HYDROCHLORIDE 0.25 MG: 0.25 TABLET, FILM COATED ORAL at 09:11

## 2019-11-06 RX ADMIN — METOPROLOL TARTRATE 25 MG: 25 TABLET ORAL at 08:11

## 2019-11-07 NOTE — DISCHARGE INSTRUCTIONS
Please follow up with her primary care physician.  At this time you must have your blood pressure control and be compliant with her medications.  Advised that keep a blood pressure log and showed this to her primary as doses might need to be adjusted versus you having to be more compliant.  If symptoms worsen you might have to consider vascular referral by her primary.    Regarding PERIPHERAL VASCULAR DISEASE, for prevention and treatment, I advised patient to: maintain a healthy weight; cut back on salt; limit canned, dried, packaged, and fast foods; begin an exercise program; avoid smoking or secondhand smoke; take all medications exactly as directed without skipping doses; refrain from sitting for long periods of time; limit alcohol intake; eat healthy low-sodium, low-fat diet; manage blood glucose and electrolytes appropriately; comply with prescribed treatment plan and referrals; and bring updated list of medications and/or pill bottles to all appointments. Call primary care provider immediately or go to closest emergency department if any of the following occur: pain in legs or a sensation that legs are giving out; persistent tingling, numbness, weakness, or coldness in feet; change in the color of toes; open sores that wont heal on toes, feet, or legs; chest pain; shortness of breath or dyspnea; and trouble speaking or understanding.     Regarding HYPERTENSION, I advised patient to: keep a record of blood pressure results; take your blood pressure medication exactly as directed without skipping doses; avoid medications that contain heart stimulants, including over-the-counter drugs such as decongestants; maintain a healthy weight; cut back on sodium intake (i.e., limit canned, dried, packaged, and fast foods and dont add salt to food); follow the DASH (Dietary Approaches to Stop Hypertension) eating plan which recommends vegetables, fruits, whole gains, and other heart healthy foods; begin an exercise  program that includes  aerobic exercise 3 to 4 times a week for an average of 40 minutes at a time (with approval of cardiologist or primary care provider); limit drinks that contain alcohol and caffeine; control levels of emotional stress; and seek emergency care for any shortness of breath, chest pain, difficulty speaking, confusion, or visual changes.

## 2019-11-07 NOTE — ED NOTES
Sister and daughter at bedside and are very supportive.   Patient states that the cramps or muscle spasms have stopped and she is not in pain at this time

## 2019-11-07 NOTE — ED PROVIDER NOTES
SCRIBE #1 NOTE: I, Tevin Campbell, am scribing for, and in the presence of, Lizzette Holder MD. I have scribed the HPI, ROS, and PEx.     SCRIBE #2 NOTE: I, Ned rGaham, am scribing for, and in the presence of,  Juliette Lott MD. I have scribed the remaining portions of the note not scribed by Scribe #1.      History     Chief Complaint   Patient presents with    Muscle Pain     Pt to ER for muscle cramps x 2 weeks, currently no complaints. Seen by [facility] for cramps, no relief with muscle relaxers. Pt HTN, did not take night time meds.      Review of patient's allergies indicates:   Allergen Reactions    Codeine Swelling    Darvon [propoxyphene] Swelling         History of Present Illness     HPI    11/6/2019, 7:08 PM  History obtained from the patient      History of Present Illness: Avril Monzon is a 74 y.o. female patient who presents to the Emergency Department for evaluation of worsening diffuse muscle cramps to the b/l legs which onset gradually 1 month ago. Symptoms are constant and moderate in severity. No mitigating or exacerbating factors reported. No associated sxs reported. Patient denies any fever, chills, CP, SOB, abd pain, n/v, and all other sxs at this time. Prior Tx includes methocarbum. No further complaints or concerns at this time. Pt states she is currently on a low sodium diet. Pt states she has been evaluated to [facility] x2 since onset of sxs.      Arrival mode: Ambulatory service    PCP: Rose Parks MD        Past Medical History:  Past Medical History:   Diagnosis Date    Acute hypoxemic respiratory failure 11/26/2018    Anemia     Arthritis     back, hand, knees    Back pain     Cataract     CKD stage G4/A3, GFR 15 - 29 and albumin creatinine ratio >300 mg/g     GFR 40% Jan 2014 and 33% ub 3/2014 ([facility])    Coronary artery disease involving native coronary artery of native heart 11/30/2018    Diabetic retinopathy     DM (diabetes mellitus) type II  controlled, neurological manifestation     Exudative age-related macular degeneration of left eye with active choroidal neovascularization 2/5/2019    GERD (gastroesophageal reflux disease)     Glaucoma     Heart failure     Hyperlipidemia     Hypertension     NSTEMI (non-ST elevated myocardial infarction) 11/27/2018    Peripheral neuropathy     Polyneuropathy     Proteinuria     >6 mo     Seizures     [facility] 1/2014 -dick CT NRI showed small vessel    Stroke 2012,2014    Tobacco dependence     Type 2 diabetes with peripheral circulatory disorder, controlled        Past Surgical History:  Past Surgical History:   Procedure Laterality Date    BREAST LUMPECTOMY Left     benign, when patient was 15 years old    BREAST MASS EXCISION      ,benign, age 15.    CATHETERIZATION OF BOTH LEFT AND RIGHT HEART N/A 11/28/2018    Procedure: CATHETERIZATION, HEART, BOTH LEFT AND RIGHT;  Surgeon: Michelle Holman MD;  Location: [facility] CATH LAB;  Service: Cardiology;  Laterality: N/A;    CATHETERIZATION OF BOTH LEFT AND RIGHT HEART N/A 11/30/2018    Procedure: CATHETERIZATION, HEART, BOTH LEFT AND RIGHT;  Surgeon: Michelle Holman MD;  Location: [facility] CATH LAB;  Service: Cardiology;  Laterality: N/A;    HYSTERECTOMY  1982    LEFT HEART CATHETERIZATION Left 12/3/2018    Procedure: CATHETERIZATION, HEART, LEFT;  Surgeon: Michelle Holman MD;  Location: [facility] CATH LAB;  Service: Cardiology;  Laterality: Left;  LAD ATHERECTOMY STENT/ FEMORAL APPROACH    OOPHORECTOMY      wrist cyst Right 1980s    dorsal wrist cyst         Family History:  Family History   Problem Relation Age of Onset    Diabetes Mother     Hyperlipidemia Mother     Hypertension Mother     Heart disease Mother         MI    Muscular dystrophy Son     Cancer Maternal Grandmother         colon       Social History:  Social History     Tobacco Use    Smoking status: Former Smoker     Packs/day: 1.50     Years: 30.00     Pack years: 45.00     Types:  Cigarettes     Last attempt to quit: 1990     Years since quittin.8    Smokeless tobacco: Former User   Substance and Sexual Activity    Alcohol use: No     Alcohol/week: 0.0 standard drinks    Drug use: No    Sexual activity: Never        Review of Systems     Review of Systems   Constitutional: Negative for chills and fever.   HENT: Negative for sore throat.    Respiratory: Negative for shortness of breath.    Cardiovascular: Negative for chest pain.   Gastrointestinal: Negative for abdominal pain, nausea and vomiting.   Genitourinary: Negative for dysuria.   Musculoskeletal: Positive for myalgias. Negative for back pain.   Skin: Negative for rash.   Neurological: Negative for weakness.   Hematological: Does not bruise/bleed easily.   All other systems reviewed and are negative.       Physical Exam     Initial Vitals   BP Pulse Resp Temp SpO2   19   (!) 235/89 76 17 98.9 °F (37.2 °C) 99 %      MAP       --                 Physical Exam  Nursing Notes and Vital Signs Reviewed.  Constitutional: Well-developed and well-nourished. NAD.  Head: Atraumatic. Normocephalic.  Eyes: PERRL. EOM intact. Conjunctivae are not pale. No scleral icterus.  ENT: Mucous membranes are moist. Oropharynx is clear and symmetric.    Neck: Supple. Full ROM. No lymphadenopathy.  Cardiovascular: Regular rate. Regular rhythm. No murmurs, rubs, or gallops. Distal pulses are 2+ and symmetric.  Pulmonary/Chest: No respiratory distress. Clear to auscultation bilaterally. No wheezing or rales.  Abdominal: Soft and non-distended.  There is no tenderness.  No rebound, guarding, or rigidity. Good bowel sounds.  Genitourinary: No CVA tenderness  Musculoskeletal: Moves all extremities. No obvious deformities. No calf tenderness.  B/L Lower Extremities: NVI. Cap refill distally is <2 seconds. DP and PT pulses are equal and 2+ bilaterally. No motor deficit. No distal sensory  deficit  Skin: Warm and dry.  Neurological:  Alert, awake, and appropriate.  Normal speech.  No acute focal neurological deficits are appreciated.  Psychiatric: Normal affect. Good eye contact. Appropriate in content.     ED Course   Procedures  ED Vital Signs:  Vitals:    11/06/19 1858 11/06/19 1903 11/06/19 2036 11/06/19 2045   BP: (!) 235/89   (!) 200/88   Pulse: 76   69   Resp: 17   16   Temp:   98.9 °F (37.2 °C)    TempSrc:   Oral    SpO2: 99%   100%   Weight:  65 kg (143 lb 4.8 oz)      11/06/19 2122 11/06/19 2226   BP: (!) 195/88 (!) 205/89   Pulse: 67 68   Resp: 20 18   Temp:  98.9 °F (37.2 °C)   TempSrc:  Oral   SpO2: 100% 99%   Weight:         Abnormal Lab Results:  Labs Reviewed   CBC W/ AUTO DIFFERENTIAL - Abnormal; Notable for the following components:       Result Value    RBC 3.37 (*)     Hemoglobin 10.2 (*)     Hematocrit 30.1 (*)     RDW 11.3 (*)     Lymph # 0.7 (*)     Gran% 79.4 (*)     Lymph% 12.3 (*)     All other components within normal limits   COMPREHENSIVE METABOLIC PANEL - Abnormal; Notable for the following components:    Glucose 135 (*)     BUN, Bld 54 (*)     Creatinine 4.5 (*)     eGFR if  11 (*)     eGFR if non  9 (*)     All other components within normal limits   CK - Abnormal; Notable for the following components:     (*)     All other components within normal limits   URINALYSIS - Abnormal; Notable for the following components:    Protein, UA 2+ (*)     Glucose, UA 3+ (*)     Occult Blood UA Trace (*)     All other components within normal limits   URINALYSIS MICROSCOPIC - Abnormal; Notable for the following components:    Yeast, UA Rare (*)     All other components within normal limits   MAGNESIUM        All Lab Results:  Results for orders placed or performed during the hospital encounter of 11/06/19   CBC auto differential   Result Value Ref Range    WBC 6.04 3.90 - 12.70 K/uL    RBC 3.37 (L) 4.00 - 5.40 M/uL    Hemoglobin 10.2 (L) 12.0 -  16.0 g/dL    Hematocrit 30.1 (L) 37.0 - 48.5 %    Mean Corpuscular Volume 89 82 - 98 fL    Mean Corpuscular Hemoglobin 30.3 27.0 - 31.0 pg    Mean Corpuscular Hemoglobin Conc 33.9 32.0 - 36.0 g/dL    RDW 11.3 (L) 11.5 - 14.5 %    Platelets 168 150 - 350 K/uL    MPV 10.8 9.2 - 12.9 fL    Immature Granulocytes 0.3 0.0 - 0.5 %    Gran # (ANC) 4.8 1.8 - 7.7 K/uL    Immature Grans (Abs) 0.02 0.00 - 0.04 K/uL    Lymph # 0.7 (L) 1.0 - 4.8 K/uL    Mono # 0.4 0.3 - 1.0 K/uL    Eos # 0.1 0.0 - 0.5 K/uL    Baso # 0.01 0.00 - 0.20 K/uL    nRBC 0 0 /100 WBC    Gran% 79.4 (H) 38.0 - 73.0 %    Lymph% 12.3 (L) 18.0 - 48.0 %    Mono% 7.0 4.0 - 15.0 %    Eosinophil% 0.8 0.0 - 8.0 %    Basophil% 0.2 0.0 - 1.9 %    Differential Method Automated    Comprehensive metabolic panel   Result Value Ref Range    Sodium 141 136 - 145 mmol/L    Potassium 3.9 3.5 - 5.1 mmol/L    Chloride 103 95 - 110 mmol/L    CO2 24 23 - 29 mmol/L    Glucose 135 (H) 70 - 110 mg/dL    BUN, Bld 54 (H) 8 - 23 mg/dL    Creatinine 4.5 (H) 0.5 - 1.4 mg/dL    Calcium 10.0 8.7 - 10.5 mg/dL    Total Protein 7.1 6.0 - 8.4 g/dL    Albumin 3.8 3.5 - 5.2 g/dL    Total Bilirubin 0.3 0.1 - 1.0 mg/dL    Alkaline Phosphatase 88 55 - 135 U/L    AST 15 10 - 40 U/L    ALT 11 10 - 44 U/L    Anion Gap 14 8 - 16 mmol/L    eGFR if African American 11 (A) >60 mL/min/1.73 m^2    eGFR if non African American 9 (A) >60 mL/min/1.73 m^2   CPK   Result Value Ref Range     (H) 20 - 180 U/L   Magnesium   Result Value Ref Range    Magnesium 1.9 1.6 - 2.6 mg/dL   Urinalysis   Result Value Ref Range    Specimen UA Urine, Clean Catch     Color, UA Yellow Yellow, Straw, Hanane    Appearance, UA Clear Clear    pH, UA 7.0 5.0 - 8.0    Specific Gravity, UA 1.010 1.005 - 1.030    Protein, UA 2+ (A) Negative    Glucose, UA 3+ (A) Negative    Ketones, UA Negative Negative    Bilirubin (UA) Negative Negative    Occult Blood UA Trace (A) Negative    Nitrite, UA Negative Negative    Urobilinogen, UA  Negative <2.0 EU/dL    Leukocytes, UA Negative Negative   Urinalysis Microscopic   Result Value Ref Range    RBC, UA 1 0 - 4 /hpf    WBC, UA 1 0 - 5 /hpf    Bacteria Rare None-Occ /hpf    Yeast, UA Rare (A) None    Squam Epithel, UA 1 /hpf    Hyaline Casts, UA 0 0-1/lpf /lpf    Microscopic Comment SEE COMMENT        The EKG was ordered, reviewed, and independently interpreted by the ED provider.  Interpretation time: 20:15  Rate: 69 BPM  Rhythm: normal sinus rhythm and sinus arrhythmia  Interpretation: Septal infarct, age undetermined. No STEMI.      The Emergency Provider reviewed the vital signs and test results, which are outlined above.     ED Discussion     8:00 PM: Dr. Holder transfers care of pt to Dr. Lott pending lab and EKG results.    9:26 PM: Reassessed pt at this time.  Pt states her condition has resolved at this time. Discussed with pt all pertinent ED information and results. Discussed pt dx and plan of tx. Gave pt all f/u and return to the ED instructions. All questions and concerns were addressed at this time. Pt expresses understanding of information and instructions, and is comfortable with plan to discharge. Pt is stable for discharge.    I discussed with patient and/or family/caretaker that evaluation in the ED does not suggest any emergent or life threatening medical conditions requiring immediate intervention beyond what was provided in the ED, and I believe patient is safe for discharge.  Regardless, an unremarkable evaluation in the ED does not preclude the development or presence of a serious of life threatening condition. As such, patient was instructed to return immediately for any worsening or change in current symptoms.    ED Course as of Nov 08 2110 Wed Nov 06, 2019 2027 Evaluation:  Patient is resting comfortably in bed.  At this time I have introduce myself and reviewed her history.  States that she has been having intermittent bilateral lower extremity cramps for quite some  time.  She states that she only took her morning medications and usually takes other medications about 4 times throughout the day.  She was given a dose of Lopressor at this time by previous provider.  Awaiting results and will re-evaluate.  At this time not having any chest pain or shortness of breath.    [VENKATESH]   2126 Have discussed resultsWith patient and family members at bedside.  Have discussed with them blood pressure control is very important for per oral vascular disease for which I believe is the cause of her bilateral lower extremity cramps.  I have discussed with them that she must be compliant, as they say that sometimes she misses her doses and is late on taking her medications.  Patient verbalized her understanding and is in agreement.  She is hemodynamically stable for discharge. We have also discussed about in the future if she still continues to have pain she may need vascular referral by her primary.  At this time she has +2 dorsalis pedis pulse and warm extremities. Sensation is intact    [VENKATESH]      ED Course User Index  [VENKATESH] Juliette Lott MD          [facility]     ED Course as of Nov 08 2110 Wed Nov 06, 2019 2027 Evaluation:  Patient is resting comfortably in bed.  At this time I have introduce myself and reviewed her history.  States that she has been having intermittent bilateral lower extremity cramps for quite some time.  She states that she only took her morning medications and usually takes other medications about 4 times throughout the day.  She was given a dose of Lopressor at this time by previous provider.  Awaiting results and will re-evaluate.  At this time not having any chest pain or shortness of breath.    [VENKATESH]   2120 Have discussed resultsWith patient and family members at bedside.  Have discussed with them blood pressure control is very important for per oral vascular disease for which I believe is the cause of her bilateral lower extremity cramps.  I have discussed with them that  she must be compliant, as they say that sometimes she misses her doses and is late on taking her medications.  Patient verbalized her understanding and is in agreement.  She is hemodynamically stable for discharge. We have also discussed about in the future if she still continues to have pain she may need vascular referral by her primary.  At this time she has +2 dorsalis pedis pulse and warm extremities. Sensation is intact    [VENKATESH]      ED Course User Index  [VENKATESH] Juliette Lott MD     Medical Decision Making:   Clinical Tests:   Lab Tests: Ordered and Reviewed  Medical Tests: Reviewed and Ordered           ED Medication(s):  Medications   rOPINIRole tablet 0.25 mg (0.25 mg Oral Given 11/6/19 2116)   metoprolol tartrate (LOPRESSOR) tablet 25 mg (25 mg Oral Given 11/6/19 2000)   isosorbide-hydrALAZINE 20-37.5 mg per tablet 1 tablet (1 tablet Oral Given 11/6/19 2000)       Discharge Medication List as of 11/6/2019  9:26 PM           Medication List      CONTINUE taking these medications    aspirin 81 MG EC tablet  Commonly known as:  ECOTRIN  Take 1 tablet (81 mg total) by mouth once daily.     atorvastatin 80 MG tablet  Commonly known as:  LIPITOR  Take 1 tablet (80 mg total) by mouth once daily.     blood sugar diagnostic Strp  1 strip by Misc.(Non-Drug; Combo Route) route 3 (three) times daily. relion ultima     calcitRIOL 0.25 MCG Cap  Commonly known as:  ROCALTROL  Take 1 capsule (0.25 mcg total) by mouth once daily.     clopidogrel 75 mg tablet  Commonly known as:  PLAVIX  Take 1 tablet (75 mg total) by mouth once daily.     insulin NPH-insulin regular (70/30) 100 unit/mL (70-30) injection  Commonly known as:  NovoLIN 70/30 U-100 Insulin  INJECT SUBCUTANEOUSLY 30 UNITS IN THE MORNING AND INJECT 25 UNITS IN THE EVENING     isosorbide-hydrALAZINE 20-37.5 mg 20-37.5 mg Tab  Commonly known as:  BIDIL  Take 2 tablets by mouth 3 (three) times daily.     lancets Misc  Commonly known as:  ACCU-CHEK SOFTCLIX LANCETS  1  lancet by Misc.(Non-Drug; Combo Route) route 3 (three) times daily.     metoprolol tartrate 25 MG tablet  Commonly known as:  LOPRESSOR  Take 1 tablet (25 mg total) by mouth 2 (two) times daily.     nitroGLYCERIN 0.4 MG SL tablet  Commonly known as:  NITROSTAT  Place 1 tablet (0.4 mg total) under the tongue every 5 (five) minutes as needed for Chest pain.     sodium bicarbonate 325 MG tablet  TAKE 1 TABLET BY MOUTH THREE TIMES DAILY     torsemide 20 MG Tab  Commonly known as:  DEMADEX  Take 2 tablets (40 mg total) by mouth once daily.            Follow-up Information     Rose Parks MD. Schedule an appointment as soon as possible for a visit in 3 days.    Specialty:  Internal Medicine  Contact information:  [id number]  [place] [address] [phone]                       Scribe Attestation:   Scribe #1: I performed the above scribed service and the documentation accurately describes the services I performed. I attest to the accuracy of the note.     Attending:   Physician Attestation Statement for Scribe #1: I, Lizzette Holder MD, personally performed the services described in this documentation, as scribed by Tevin Campbell, in my presence, and it is both accurate and complete.       Scribe Attestation:   Scribe #2: I performed the above scribed service and the documentation accurately describes the services I performed. I attest to the accuracy of the note.    Attending Attestation:           Physician Attestation for Scribe:    Physician Attestation Statement for Scribe #2: I, Juliette Lott MD, reviewed documentation, as scribed by Ned Graham in my presence, and it is both accurate and complete. I also acknowledge and confirm the content of the note done by Scribe #1.        Discharge Instructions that were discussed with patient:    Please follow up with her primary care physician.  At this time you must have your blood pressure control and be compliant with her medications.  Advised  that keep a blood pressure log and showed this to her primary as doses might need to be adjusted versus you having to be more compliant.  If symptoms worsen you might have to consider vascular referral by her primary.    Regarding PERIPHERAL VASCULAR DISEASE, for prevention and treatment, I advised patient to: maintain a healthy weight; cut back on salt; limit canned, dried, packaged, and fast foods; begin an exercise program; avoid smoking or secondhand smoke; take all medications exactly as directed without skipping doses; refrain from sitting for long periods of time; limit alcohol intake; eat healthy low-sodium, low-fat diet; manage blood glucose and electrolytes appropriately; comply with prescribed treatment plan and referrals; and bring updated list of medications and/or pill bottles to all appointments. Call primary care provider immediately or go to closest emergency department if any of the following occur: pain in legs or a sensation that legs are giving out; persistent tingling, numbness, weakness, or coldness in feet; change in the color of toes; open sores that wont heal on toes, feet, or legs; chest pain; shortness of breath or dyspnea; and trouble speaking or understanding.     Regarding HYPERTENSION, I advised patient to: keep a record of blood pressure results; take your blood pressure medication exactly as directed without skipping doses; avoid medications that contain heart stimulants, including over-the-counter drugs such as decongestants; maintain a healthy weight; cut back on sodium intake (i.e., limit canned, dried, packaged, and fast foods and dont add salt to food); follow the DASH (Dietary Approaches to Stop Hypertension) eating plan which recommends vegetables, fruits, whole gains, and other heart healthy foods; begin an exercise program that includes  aerobic exercise 3 to 4 times a week for an average of 40 minutes at a time (with approval of cardiologist or primary care provider);  "limit drinks that contain alcohol and caffeine; control levels of emotional stress; and seek emergency care for any shortness of breath, chest pain, difficulty speaking, confusion, or visual changes.         Clinical Impression       ICD-10-CM ICD-9-CM   1. PVD (peripheral vascular disease) I73.9 443.9   2. Hypertension I10 401.9       Disposition:   Disposition: Discharged  Condition: Stable    Portions of this note may have been created with voice recognition software. Occasional "wrong-word" or "sound-a-like" substitutions may have occurred due to the inherent limitations of voice recognition software. Please, read the note carefully and recognize, using context, where substitutions have occurred.          Juliette Lott MD  11/08/19 2110    "

## 2019-11-08 ENCOUNTER — LAB VISIT (OUTPATIENT)
Dept: LAB | Facility: HOSPITAL | Age: 74
End: 2019-11-08
Attending: INTERNAL MEDICINE
Payer: MEDICARE

## 2019-11-08 ENCOUNTER — OFFICE VISIT (OUTPATIENT)
Dept: HEMATOLOGY/ONCOLOGY | Facility: CLINIC | Age: 74
End: 2019-11-08
Payer: MEDICARE

## 2019-11-08 VITALS
TEMPERATURE: 97 F | HEART RATE: 74 BPM | DIASTOLIC BLOOD PRESSURE: 72 MMHG | WEIGHT: 144.19 LBS | OXYGEN SATURATION: 99 % | BODY MASS INDEX: 23.27 KG/M2 | SYSTOLIC BLOOD PRESSURE: 182 MMHG

## 2019-11-08 DIAGNOSIS — I15.2 HYPERTENSION ASSOCIATED WITH DIABETES: Chronic | ICD-10-CM

## 2019-11-08 DIAGNOSIS — N18.4 ANEMIA IN STAGE 4 CHRONIC KIDNEY DISEASE: Primary | ICD-10-CM

## 2019-11-08 DIAGNOSIS — N18.4 ANEMIA IN STAGE 4 CHRONIC KIDNEY DISEASE: ICD-10-CM

## 2019-11-08 DIAGNOSIS — D63.1 ANEMIA DUE TO STAGE 5 CHRONIC KIDNEY DISEASE: ICD-10-CM

## 2019-11-08 DIAGNOSIS — D63.1 ANEMIA IN STAGE 4 CHRONIC KIDNEY DISEASE: Primary | ICD-10-CM

## 2019-11-08 DIAGNOSIS — E11.3553 TYPE 2 DIABETES MELLITUS WITH STABLE PROLIFERATIVE RETINOPATHY OF BOTH EYES, WITH LONG-TERM CURRENT USE OF INSULIN: ICD-10-CM

## 2019-11-08 DIAGNOSIS — E11.59 HYPERTENSION ASSOCIATED WITH DIABETES: Chronic | ICD-10-CM

## 2019-11-08 DIAGNOSIS — N18.5 ANEMIA DUE TO STAGE 5 CHRONIC KIDNEY DISEASE: ICD-10-CM

## 2019-11-08 DIAGNOSIS — Z79.4 TYPE 2 DIABETES MELLITUS WITH STABLE PROLIFERATIVE RETINOPATHY OF BOTH EYES, WITH LONG-TERM CURRENT USE OF INSULIN: ICD-10-CM

## 2019-11-08 DIAGNOSIS — D63.1 ANEMIA IN STAGE 4 CHRONIC KIDNEY DISEASE: ICD-10-CM

## 2019-11-08 LAB
BASOPHILS # BLD AUTO: 0.01 K/UL (ref 0–0.2)
BASOPHILS NFR BLD: 0.2 % (ref 0–1.9)
CREAT SERPL-MCNC: 5.3 MG/DL (ref 0.5–1.4)
DIFFERENTIAL METHOD: ABNORMAL
EOSINOPHIL # BLD AUTO: 0.1 K/UL (ref 0–0.5)
EOSINOPHIL NFR BLD: 1.2 % (ref 0–8)
ERYTHROCYTE [DISTWIDTH] IN BLOOD BY AUTOMATED COUNT: 11.6 % (ref 11.5–14.5)
EST. GFR  (AFRICAN AMERICAN): 9 ML/MIN/1.73 M^2
EST. GFR  (NON AFRICAN AMERICAN): 8 ML/MIN/1.73 M^2
HCT VFR BLD AUTO: 30.2 % (ref 37–48.5)
HGB BLD-MCNC: 9.7 G/DL (ref 12–16)
IMM GRANULOCYTES # BLD AUTO: 0.01 K/UL (ref 0–0.04)
IMM GRANULOCYTES NFR BLD AUTO: 0.2 % (ref 0–0.5)
LYMPHOCYTES # BLD AUTO: 0.8 K/UL (ref 1–4.8)
LYMPHOCYTES NFR BLD: 13.1 % (ref 18–48)
MCH RBC QN AUTO: 29.8 PG (ref 27–31)
MCHC RBC AUTO-ENTMCNC: 32.1 G/DL (ref 32–36)
MCV RBC AUTO: 93 FL (ref 82–98)
MONOCYTES # BLD AUTO: 0.3 K/UL (ref 0.3–1)
MONOCYTES NFR BLD: 5 % (ref 4–15)
NEUTROPHILS # BLD AUTO: 4.7 K/UL (ref 1.8–7.7)
NEUTROPHILS NFR BLD: 80.5 % (ref 38–73)
NRBC BLD-RTO: 0 /100 WBC
PLATELET # BLD AUTO: 153 K/UL (ref 150–350)
PMV BLD AUTO: 9.7 FL (ref 9.2–12.9)
RBC # BLD AUTO: 3.26 M/UL (ref 4–5.4)
WBC # BLD AUTO: 5.8 K/UL (ref 3.9–12.7)

## 2019-11-08 PROCEDURE — 1101F PT FALLS ASSESS-DOCD LE1/YR: CPT | Mod: HCNC,CPTII,S$GLB, | Performed by: INTERNAL MEDICINE

## 2019-11-08 PROCEDURE — 99999 PR PBB SHADOW E&M-EST. PATIENT-LVL III: ICD-10-PCS | Mod: PBBFAC,HCNC,, | Performed by: INTERNAL MEDICINE

## 2019-11-08 PROCEDURE — 99999 PR PBB SHADOW E&M-EST. PATIENT-LVL III: CPT | Mod: PBBFAC,HCNC,, | Performed by: INTERNAL MEDICINE

## 2019-11-08 PROCEDURE — 85025 COMPLETE CBC W/AUTO DIFF WBC: CPT | Mod: HCNC

## 2019-11-08 PROCEDURE — 99499 UNLISTED E&M SERVICE: CPT | Mod: HCNC,S$GLB,, | Performed by: INTERNAL MEDICINE

## 2019-11-08 PROCEDURE — 99499 RISK ADDL DX/OHS AUDIT: ICD-10-PCS | Mod: HCNC,S$GLB,, | Performed by: INTERNAL MEDICINE

## 2019-11-08 PROCEDURE — 3077F SYST BP >= 140 MM HG: CPT | Mod: HCNC,CPTII,S$GLB, | Performed by: INTERNAL MEDICINE

## 2019-11-08 PROCEDURE — 36415 COLL VENOUS BLD VENIPUNCTURE: CPT | Mod: HCNC

## 2019-11-08 PROCEDURE — 3078F PR MOST RECENT DIASTOLIC BLOOD PRESSURE < 80 MM HG: ICD-10-PCS | Mod: HCNC,CPTII,S$GLB, | Performed by: INTERNAL MEDICINE

## 2019-11-08 PROCEDURE — 82565 ASSAY OF CREATININE: CPT | Mod: HCNC

## 2019-11-08 PROCEDURE — 3077F PR MOST RECENT SYSTOLIC BLOOD PRESSURE >= 140 MM HG: ICD-10-PCS | Mod: HCNC,CPTII,S$GLB, | Performed by: INTERNAL MEDICINE

## 2019-11-08 PROCEDURE — 99214 PR OFFICE/OUTPT VISIT, EST, LEVL IV, 30-39 MIN: ICD-10-PCS | Mod: HCNC,S$GLB,, | Performed by: INTERNAL MEDICINE

## 2019-11-08 PROCEDURE — 99214 OFFICE O/P EST MOD 30 MIN: CPT | Mod: HCNC,S$GLB,, | Performed by: INTERNAL MEDICINE

## 2019-11-08 PROCEDURE — 3078F DIAST BP <80 MM HG: CPT | Mod: HCNC,CPTII,S$GLB, | Performed by: INTERNAL MEDICINE

## 2019-11-08 PROCEDURE — 1101F PR PT FALLS ASSESS DOC 0-1 FALLS W/OUT INJ PAST YR: ICD-10-PCS | Mod: HCNC,CPTII,S$GLB, | Performed by: INTERNAL MEDICINE

## 2019-11-08 RX ORDER — METHOCARBAMOL 750 MG/1
500 TABLET, FILM COATED ORAL 4 TIMES DAILY
COMMUNITY
End: 2020-01-31

## 2019-11-09 NOTE — PROGRESS NOTES
Subjective:      DATE OF VISIT: 11/9/2019   ?   ?   Patient ID:?Avril Monzon is a 74 y.o. female.?? MR#: [id number]   ?   PRIMARY PROVIDER: Dr. Fletcher transferring to Dr. De La Cruz    ?   CHIEF COMPLAINT:  Follow-up  ?   DIAGNOSIS:  Anemia of chronic kidney disease?????   ?   CURRENT TREATMENT:  DIANE, Procrit 89505 units every other week for hemoglobin less than 10      HPI    I had the pleasure meeting for the 1st time Ms. Monzon who returns for follow-up.  She was previously seen initially by Dr. Fletcher and at last visit by Dr. Oseguera.  She has been followed for anemia of chronic kidney disease in the setting of type 2 diabetes and hypertension.  She has been receiving 01805 units of Procrit as needed.  She has not received since May 2018 due to hemoglobin over 10.  She denies change in fatigue/energy level.  No bleeding or bruising.  She does have hypertension but denies headache, visual change or other symptoms    Review of Systems    ?   A comprehensive 14-point review of systems was reviewed with patient and was negative other than as specified above.   ?     Objective:      Physical Exam      ?   Vitals:    11/08/19 1501   BP: (!) 182/72   Pulse: 74   Temp: 97.4 °F (36.3 °C)      ?   ECOG:?.0    General appearance: Generally well appearing, in no acute distress.   Head, eyes, ears, nose, and throat: Oropharynx clear with moist mucous membranes.   Cardiovascular: Regular rate and rhythm, S1, S2, no audible murmurs.   Respiratory: Lungs clear to auscultation bilaterally.   Abdomen: nontender, nondistended.   Extremities: Warm, without edema.   Neurologic: Alert and oriented. Grossly normal strength, coordination, and gait.   Skin: No rashes, ecchymoses or petechial lesion.   Psychiatric: normal mood and affect, conversant and appropriate    ?   Laboratory:  ?   Lab Visit on 11/08/2019   Component Date Value Ref Range Status    WBC 11/08/2019 5.80  3.90 - 12.70 K/uL Final    RBC 11/08/2019 3.26* 4.00 - 5.40 M/uL  Final    Hemoglobin 11/08/2019 9.7* 12.0 - 16.0 g/dL Final    Hematocrit 11/08/2019 30.2* 37.0 - 48.5 % Final    Mean Corpuscular Volume 11/08/2019 93  82 - 98 fL Final    Mean Corpuscular Hemoglobin 11/08/2019 29.8  27.0 - 31.0 pg Final    Mean Corpuscular Hemoglobin Conc 11/08/2019 32.1  32.0 - 36.0 g/dL Final    RDW 11/08/2019 11.6  11.5 - 14.5 % Final    Platelets 11/08/2019 153  150 - 350 K/uL Final    MPV 11/08/2019 9.7  9.2 - 12.9 fL Final    Immature Granulocytes 11/08/2019 0.2  0.0 - 0.5 % Final    Gran # (ANC) 11/08/2019 4.7  1.8 - 7.7 K/uL Final    Immature Grans (Abs) 11/08/2019 0.01  0.00 - 0.04 K/uL Final    Lymph # 11/08/2019 0.8* 1.0 - 4.8 K/uL Final    Mono # 11/08/2019 0.3  0.3 - 1.0 K/uL Final    Eos # 11/08/2019 0.1  0.0 - 0.5 K/uL Final    Baso # 11/08/2019 0.01  0.00 - 0.20 K/uL Final    nRBC 11/08/2019 0  0 /100 WBC Final    Gran% 11/08/2019 80.5* 38.0 - 73.0 % Final    Lymph% 11/08/2019 13.1* 18.0 - 48.0 % Final    Mono% 11/08/2019 5.0  4.0 - 15.0 % Final    Eosinophil% 11/08/2019 1.2  0.0 - 8.0 % Final    Basophil% 11/08/2019 0.2  0.0 - 1.9 % Final    Differential Method 11/08/2019 Automated   Final    Creatinine 11/08/2019 5.3* 0.5 - 1.4 mg/dL Final    eGFR if  11/08/2019 9* >60 mL/min/1.73 m^2 Final    eGFR if non African American 11/08/2019 8* >60 mL/min/1.73 m^2 Final      ?   ?   Assessment/Plan:       1. Anemia in stage 4 chronic kidney disease    2. Hypertension associated with diabetes    3. 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, with long-term current use of insulin    4. Anemia due to stage 5 chronic kidney disease          Plan:     # anemia of chronic kidney disease:  Last received Procrit May 2018.  Labs today with hemoglobin less than 10 and will resume Procrit 87311 units and again in 2 weeks.  I will see her in follow-up in 1 month with repeat CBC and iron indices determine if supplementation  needed.  Last iron indices without deficiency 07/29/2019.    # chronic kidney disease:  Creatinine elevation, follow-up with Nephrology was recommended.  Also discussed poorly controlled hypertension and follow-up with PCP recommended.    # hypertension:  Poorly controlled hypertension recommended PCP follow-up for medical management.      Follow-Up:   Procrit in 2 weeks and return visit with labs and possible Procrit in 4 weeks, labs 1 week prior to that.

## 2019-11-15 ENCOUNTER — TELEPHONE (OUTPATIENT)
Dept: INFUSION THERAPY | Facility: HOSPITAL | Age: 74
End: 2019-11-15

## 2019-11-15 NOTE — TELEPHONE ENCOUNTER
Spoke with daughter.  Elizabeth Monzon ( emergency contact)  Rescheduled patient's appointment for next week.

## 2019-11-22 ENCOUNTER — TELEPHONE (OUTPATIENT)
Dept: INFUSION THERAPY | Facility: HOSPITAL | Age: 74
End: 2019-11-22

## 2019-11-27 ENCOUNTER — OFFICE VISIT (OUTPATIENT)
Dept: CARDIOLOGY | Facility: CLINIC | Age: 74
End: 2019-11-27
Payer: MEDICARE

## 2019-11-27 VITALS
BODY MASS INDEX: 21.9 KG/M2 | HEIGHT: 66 IN | HEART RATE: 73 BPM | WEIGHT: 136.25 LBS | SYSTOLIC BLOOD PRESSURE: 132 MMHG | DIASTOLIC BLOOD PRESSURE: 62 MMHG

## 2019-11-27 DIAGNOSIS — R25.2 LEG CRAMPS: ICD-10-CM

## 2019-11-27 DIAGNOSIS — I36.1 NON-RHEUMATIC TRICUSPID VALVE INSUFFICIENCY: ICD-10-CM

## 2019-11-27 DIAGNOSIS — I11.0 ACCELERATED HYPERTENSION WITH DIASTOLIC CONGESTIVE HEART FAILURE, NYHA CLASS 3: ICD-10-CM

## 2019-11-27 DIAGNOSIS — I50.42 CHRONIC COMBINED SYSTOLIC AND DIASTOLIC CONGESTIVE HEART FAILURE: ICD-10-CM

## 2019-11-27 DIAGNOSIS — I25.111 CORONARY ARTERY DISEASE INVOLVING NATIVE CORONARY ARTERY OF NATIVE HEART WITH ANGINA PECTORIS WITH DOCUMENTED SPASM: Primary | ICD-10-CM

## 2019-11-27 DIAGNOSIS — I15.2 HYPERTENSION ASSOCIATED WITH DIABETES: Chronic | ICD-10-CM

## 2019-11-27 DIAGNOSIS — I50.30 ACCELERATED HYPERTENSION WITH DIASTOLIC CONGESTIVE HEART FAILURE, NYHA CLASS 3: ICD-10-CM

## 2019-11-27 DIAGNOSIS — I77.9 BILATERAL CAROTID ARTERY DISEASE, UNSPECIFIED TYPE: ICD-10-CM

## 2019-11-27 DIAGNOSIS — E11.69 HYPERLIPIDEMIA ASSOCIATED WITH TYPE 2 DIABETES MELLITUS: ICD-10-CM

## 2019-11-27 DIAGNOSIS — Z95.5 S/P CORONARY ARTERY STENT PLACEMENT: ICD-10-CM

## 2019-11-27 DIAGNOSIS — I27.20 PULMONARY HYPERTENSION: ICD-10-CM

## 2019-11-27 DIAGNOSIS — E78.5 HYPERLIPIDEMIA ASSOCIATED WITH TYPE 2 DIABETES MELLITUS: ICD-10-CM

## 2019-11-27 DIAGNOSIS — E11.59 HYPERTENSION ASSOCIATED WITH DIABETES: Chronic | ICD-10-CM

## 2019-11-27 DIAGNOSIS — Z86.73 HISTORY OF CVA (CEREBROVASCULAR ACCIDENT): ICD-10-CM

## 2019-11-27 DIAGNOSIS — I34.0 NON-RHEUMATIC MITRAL REGURGITATION: ICD-10-CM

## 2019-11-27 PROBLEM — I21.4 NSTEMI (NON-ST ELEVATED MYOCARDIAL INFARCTION): Status: RESOLVED | Noted: 2018-11-27 | Resolved: 2019-11-27

## 2019-11-27 PROBLEM — I21.4 NON-ST ELEVATION MI (NSTEMI): Status: RESOLVED | Noted: 2018-11-28 | Resolved: 2019-11-27

## 2019-11-27 PROCEDURE — 1126F PR PAIN SEVERITY QUANTIFIED, NO PAIN PRESENT: ICD-10-PCS | Mod: HCNC,S$GLB,, | Performed by: PHYSICIAN ASSISTANT

## 2019-11-27 PROCEDURE — 99999 PR PBB SHADOW E&M-EST. PATIENT-LVL IV: CPT | Mod: PBBFAC,HCNC,, | Performed by: PHYSICIAN ASSISTANT

## 2019-11-27 PROCEDURE — 3075F SYST BP GE 130 - 139MM HG: CPT | Mod: HCNC,CPTII,S$GLB, | Performed by: PHYSICIAN ASSISTANT

## 2019-11-27 PROCEDURE — 99214 OFFICE O/P EST MOD 30 MIN: CPT | Mod: HCNC,S$GLB,, | Performed by: PHYSICIAN ASSISTANT

## 2019-11-27 PROCEDURE — 99214 PR OFFICE/OUTPT VISIT, EST, LEVL IV, 30-39 MIN: ICD-10-PCS | Mod: HCNC,S$GLB,, | Performed by: PHYSICIAN ASSISTANT

## 2019-11-27 PROCEDURE — 1126F AMNT PAIN NOTED NONE PRSNT: CPT | Mod: HCNC,S$GLB,, | Performed by: PHYSICIAN ASSISTANT

## 2019-11-27 PROCEDURE — 1101F PR PT FALLS ASSESS DOC 0-1 FALLS W/OUT INJ PAST YR: ICD-10-PCS | Mod: HCNC,CPTII,S$GLB, | Performed by: PHYSICIAN ASSISTANT

## 2019-11-27 PROCEDURE — 1159F PR MEDICATION LIST DOCUMENTED IN MEDICAL RECORD: ICD-10-PCS | Mod: HCNC,S$GLB,, | Performed by: PHYSICIAN ASSISTANT

## 2019-11-27 PROCEDURE — 1101F PT FALLS ASSESS-DOCD LE1/YR: CPT | Mod: HCNC,CPTII,S$GLB, | Performed by: PHYSICIAN ASSISTANT

## 2019-11-27 PROCEDURE — 3078F DIAST BP <80 MM HG: CPT | Mod: HCNC,CPTII,S$GLB, | Performed by: PHYSICIAN ASSISTANT

## 2019-11-27 PROCEDURE — 99999 PR PBB SHADOW E&M-EST. PATIENT-LVL IV: ICD-10-PCS | Mod: PBBFAC,HCNC,, | Performed by: PHYSICIAN ASSISTANT

## 2019-11-27 PROCEDURE — 1159F MED LIST DOCD IN RCRD: CPT | Mod: HCNC,S$GLB,, | Performed by: PHYSICIAN ASSISTANT

## 2019-11-27 PROCEDURE — 3075F PR MOST RECENT SYSTOLIC BLOOD PRESS GE 130-139MM HG: ICD-10-PCS | Mod: HCNC,CPTII,S$GLB, | Performed by: PHYSICIAN ASSISTANT

## 2019-11-27 PROCEDURE — 3078F PR MOST RECENT DIASTOLIC BLOOD PRESSURE < 80 MM HG: ICD-10-PCS | Mod: HCNC,CPTII,S$GLB, | Performed by: PHYSICIAN ASSISTANT

## 2019-11-27 NOTE — PROGRESS NOTES
"Subjective:    Patient ID:  Avril Monzon is a 74 y.o. female who presents for follow-up of CAD, HTN, CHF      HPI  Ms. Monzon is a 74 year old female patient with a PMHx of HTN, hyperlipidemia, DM type II, former tobacco abuse, CKD, anemia, history of CVA, CAD s/p previous NSTEMI with PCI of LAD in 12/18 who presents today for follow-up. She returns today and states she is doing ok. Complains of bilateral leg cramps. States "comes and goes". Associated with some unsteady gait and generalized joint pain. States she will even get cramps in her fingers. Concerned it could be medication related. Other than that issue, patient seems to be doing be well. No chest pain, heaviness, or tightness. Does admit to SOB which she attributes to her anemia. No PND, orthopnea, lower extremity edema, or weight gain. No lightheadedness, dizziness, near syncope, or syncope. No falls. BP stable. Patient reports compliance with her medications.     Recent labs from ED visit reviewed. Creatinine recently 5.3. Needs to see nephrology.     Review of Systems   Constitution: Negative for chills, decreased appetite, fever and malaise/fatigue.   HENT: Negative for congestion, hoarse voice and sore throat.    Eyes: Negative for blurred vision and discharge.   Cardiovascular: Positive for dyspnea on exertion. Negative for chest pain, claudication, cyanosis, irregular heartbeat, leg swelling, near-syncope, orthopnea, palpitations and paroxysmal nocturnal dyspnea.   Respiratory: Positive for shortness of breath. Negative for cough, hemoptysis, snoring, sputum production and wheezing.    Endocrine: Negative for cold intolerance and heat intolerance.   Hematologic/Lymphatic: Negative for bleeding problem. Does not bruise/bleed easily.   Skin: Negative for rash.   Musculoskeletal: Positive for arthritis, joint pain and muscle cramps. Negative for back pain, joint swelling, muscle weakness and myalgias.        Leg cramps     Gastrointestinal: Negative " "for abdominal pain, constipation, diarrhea, heartburn, melena and nausea.   Genitourinary: Negative for hematuria.   Neurological: Negative for dizziness, focal weakness, headaches, light-headedness, loss of balance, numbness, paresthesias, seizures and weakness.   Psychiatric/Behavioral: Negative for memory loss. The patient does not have insomnia.    Allergic/Immunologic: Negative for hives.     /62 (BP Location: Left arm)   Pulse 73   Ht 5' 6" (1.676 m)   Wt 61.8 kg (136 lb 3.9 oz)   LMP  (LMP Unknown)   BMI 21.99 kg/m²   Objective:    Physical Exam   Constitutional: She is oriented to person, place, and time. She appears well-developed and well-nourished. No distress.   HENT:   Head: Normocephalic and atraumatic.   Eyes: Pupils are equal, round, and reactive to light. Right eye exhibits no discharge. Left eye exhibits no discharge.   Neck: Neck supple. No JVD present.   Cardiovascular: Normal rate, regular rhythm, S1 normal, S2 normal and normal heart sounds.   No murmur heard.  Pulses:       Carotid pulses are on the right side with bruit, and on the left side with bruit.  Pulmonary/Chest: Effort normal and breath sounds normal. No respiratory distress. She has no wheezes. She has no rales.   Abdominal: Soft. She exhibits no distension.   Musculoskeletal: She exhibits no edema.   Neurological: She is alert and oriented to person, place, and time.   Skin: Skin is warm and dry. She is not diaphoretic. No erythema.   Psychiatric: She has a normal mood and affect. Her behavior is normal. Thought content normal.   Nursing note and vitals reviewed.    D. Angiographic Results     Diagnostic:          Patient has a right dominant coronary artery.        - Left Main Coronary Artery:             The LM is normal. There is GRANT 3 flow.     - Left Anterior Descending Artery:             The proximal LAD has a 95% stenosis. There is GRANT 3 flow.                     Lesion Details:   The length is 10mm, eccentric " shape, moderate proximal tortuosity, segment angulation of 45-90 degrees, irregular contour, moderate to heavy calcification.     - Left Circumflex Artery:             The ostial LCX has a 50% stenosis. There is GRANT 3 flow. THE VESSEL IS TRTUOUS AND CALCIFIED. THERE WAS SUPERIMPOSED SPASM THAT HAS RESOLVED.     - Right Coronary Artery:             The RCA was not studied.     - Left External Iliac Artery:             The left EIA is normal.     - Left Common Femoral Artery:             The left CFA is normal.     - Left Superficial Femoral Artery:             The left SFA is normal.     - Left Profunda Femoral Artery:             The left PFA is normal.      Intervention          Proximal LAD:              The lesion was successfully intervened. Post-stenosis of 0% and post-GRANT 3 flow. The vessel was accessed natively.  The following items were used: Diamondback Coronary, SampalRx Quantum Standish 2.0 X 30 and Stent Resolute Rx 3.50x15 (ANTHONY).      2D Echo CONCLUSIONS     1 - Wall motion abnormalities.     2 - Mildly depressed left ventricular systolic function (EF 45-50%).     3 - Impaired LV relaxation, normal LAP (grade 1 diastolic dysfunction).     4 - Normal right ventricular systolic function .     5 - Pulmonary hypertension. The estimated PA systolic pressure is 69 mmHg.     6 - Mild to moderate mitral regurgitation.     7 - Mild tricuspid regurgitation.     8 - Intermediate central venous pressure.     9 - Right pleural effusion.     TEST DESCRIPTION     RIGHT  The right Proximal Common Carotid Artery is visualized.   The right carotid bulb artery is visualized, associated with homogeneous plaque.   The right Distal Internal Carotid Artery has 40 - 49% stenosis, associated with tortuosity.   There is acceleration in the right external carotid artery.   The right vertebral artery is visualized, associated with anterograde flow.   The right ICA/CCA ratio is: 1.48    LEFT  The left Mid Common Carotid Artery is  visualized.   The left carotid bulb artery is visualized, associated with homogeneous plaque.   The left Distal Internal Carotid Artery has 40 - 49% stenosis, associated with tortuosity.   There is acceleration in the left external carotid artery.   The left vertebral artery is visualized, associated with anterograde flow.   The left ICA/CCA ratio is: 1.56    Carotid U/S  CONCLUSIONS   There is 40 - 49% right Internal Carotid stenosis.  There is 40 - 49% left Internal Carotid stenosis.  Assessment:       1. Coronary artery disease involving native coronary artery of native heart with angina pectoris with documented spasm    2. Hyperlipidemia associated with type 2 diabetes mellitus    3. Hypertension associated with diabetes    4. Non-rheumatic mitral regurgitation    5. Non-rheumatic tricuspid valve insufficiency    6. Accelerated hypertension with diastolic congestive heart failure, NYHA class 3    7. Bilateral carotid artery disease, unspecified type    8. Chronic combined systolic and diastolic congestive heart failure    9. Pulmonary hypertension    10. History of CVA (cerebrovascular accident)    11. S/P coronary artery stent placement    12. Leg cramps      Patient presents for f/u. Doing ok. No ministerio chest pain or tightness. Does admit to some SOB/ALVARADO which she attributes to her anemia however given her cardiac history/risk factors, will check MPI stress test and 2D echo for further evaluation. Patient also with leg cramps. Check BLAISE and decrease statin dosage to see if this helps with her symptoms. Needs repeat labs and f/u with nephrology ASAP as creatinine bumped on recent BMP.   Plan:   -Leg/joint pain could be influenced by high dose of Lipitor, CPK mildly elevated at ED visit  - Lipitor to 40 mg daily  -Check screening BLAISE given her vascular history  -MPI stress test, 2D echo for further evaluation of SOB  -Continue other CV meds  -Needs to f/u with nephrology ASAP  -Add lipid, BNP, CMP to  fasting labs next month  -Follow-up TBA post-tests

## 2019-12-09 ENCOUNTER — LAB VISIT (OUTPATIENT)
Dept: LAB | Facility: HOSPITAL | Age: 74
End: 2019-12-09
Payer: MEDICARE

## 2019-12-09 ENCOUNTER — OFFICE VISIT (OUTPATIENT)
Dept: FAMILY MEDICINE | Facility: CLINIC | Age: 74
End: 2019-12-09
Payer: MEDICARE

## 2019-12-09 VITALS
WEIGHT: 142.19 LBS | OXYGEN SATURATION: 99 % | TEMPERATURE: 98 F | SYSTOLIC BLOOD PRESSURE: 137 MMHG | HEIGHT: 66 IN | BODY MASS INDEX: 22.85 KG/M2 | HEART RATE: 70 BPM | DIASTOLIC BLOOD PRESSURE: 57 MMHG

## 2019-12-09 DIAGNOSIS — Z79.4 TYPE 2 DIABETES MELLITUS WITH STABLE PROLIFERATIVE RETINOPATHY OF BOTH EYES, WITH LONG-TERM CURRENT USE OF INSULIN: ICD-10-CM

## 2019-12-09 DIAGNOSIS — I15.2 HYPERTENSION ASSOCIATED WITH DIABETES: Chronic | ICD-10-CM

## 2019-12-09 DIAGNOSIS — E11.3553 TYPE 2 DIABETES MELLITUS WITH STABLE PROLIFERATIVE RETINOPATHY OF BOTH EYES, WITH LONG-TERM CURRENT USE OF INSULIN: ICD-10-CM

## 2019-12-09 DIAGNOSIS — R73.9 HYPERGLYCEMIA: ICD-10-CM

## 2019-12-09 DIAGNOSIS — I50.30 ACCELERATED HYPERTENSION WITH DIASTOLIC CONGESTIVE HEART FAILURE, NYHA CLASS 3: ICD-10-CM

## 2019-12-09 DIAGNOSIS — Z86.73 HISTORY OF CVA (CEREBROVASCULAR ACCIDENT): ICD-10-CM

## 2019-12-09 DIAGNOSIS — I11.0 ACCELERATED HYPERTENSION WITH DIASTOLIC CONGESTIVE HEART FAILURE, NYHA CLASS 3: ICD-10-CM

## 2019-12-09 DIAGNOSIS — Z09 HOSPITAL DISCHARGE FOLLOW-UP: Primary | ICD-10-CM

## 2019-12-09 DIAGNOSIS — E11.59 HYPERTENSION ASSOCIATED WITH DIABETES: Chronic | ICD-10-CM

## 2019-12-09 PROCEDURE — 3078F DIAST BP <80 MM HG: CPT | Mod: HCNC,CPTII,S$GLB, | Performed by: FAMILY MEDICINE

## 2019-12-09 PROCEDURE — 3288F FALL RISK ASSESSMENT DOCD: CPT | Mod: HCNC,CPTII,S$GLB, | Performed by: FAMILY MEDICINE

## 2019-12-09 PROCEDURE — 36415 COLL VENOUS BLD VENIPUNCTURE: CPT | Mod: HCNC,PO

## 2019-12-09 PROCEDURE — 99999 PR PBB SHADOW E&M-EST. PATIENT-LVL III: CPT | Mod: PBBFAC,HCNC,, | Performed by: FAMILY MEDICINE

## 2019-12-09 PROCEDURE — 1126F AMNT PAIN NOTED NONE PRSNT: CPT | Mod: HCNC,S$GLB,, | Performed by: FAMILY MEDICINE

## 2019-12-09 PROCEDURE — 1126F PR PAIN SEVERITY QUANTIFIED, NO PAIN PRESENT: ICD-10-PCS | Mod: HCNC,S$GLB,, | Performed by: FAMILY MEDICINE

## 2019-12-09 PROCEDURE — 1100F PTFALLS ASSESS-DOCD GE2>/YR: CPT | Mod: HCNC,CPTII,S$GLB, | Performed by: FAMILY MEDICINE

## 2019-12-09 PROCEDURE — 3078F PR MOST RECENT DIASTOLIC BLOOD PRESSURE < 80 MM HG: ICD-10-PCS | Mod: HCNC,CPTII,S$GLB, | Performed by: FAMILY MEDICINE

## 2019-12-09 PROCEDURE — 99999 PR PBB SHADOW E&M-EST. PATIENT-LVL III: ICD-10-PCS | Mod: PBBFAC,HCNC,, | Performed by: FAMILY MEDICINE

## 2019-12-09 PROCEDURE — 83036 HEMOGLOBIN GLYCOSYLATED A1C: CPT | Mod: HCNC

## 2019-12-09 PROCEDURE — 99214 PR OFFICE/OUTPT VISIT, EST, LEVL IV, 30-39 MIN: ICD-10-PCS | Mod: HCNC,S$GLB,, | Performed by: FAMILY MEDICINE

## 2019-12-09 PROCEDURE — 3075F PR MOST RECENT SYSTOLIC BLOOD PRESS GE 130-139MM HG: ICD-10-PCS | Mod: HCNC,CPTII,S$GLB, | Performed by: FAMILY MEDICINE

## 2019-12-09 PROCEDURE — 1159F PR MEDICATION LIST DOCUMENTED IN MEDICAL RECORD: ICD-10-PCS | Mod: HCNC,S$GLB,, | Performed by: FAMILY MEDICINE

## 2019-12-09 PROCEDURE — 3075F SYST BP GE 130 - 139MM HG: CPT | Mod: HCNC,CPTII,S$GLB, | Performed by: FAMILY MEDICINE

## 2019-12-09 PROCEDURE — 1159F MED LIST DOCD IN RCRD: CPT | Mod: HCNC,S$GLB,, | Performed by: FAMILY MEDICINE

## 2019-12-09 PROCEDURE — 99214 OFFICE O/P EST MOD 30 MIN: CPT | Mod: HCNC,S$GLB,, | Performed by: FAMILY MEDICINE

## 2019-12-09 PROCEDURE — 1100F PR PT FALLS ASSESS DOC 2+ FALLS/FALL W/INJURY/YR: ICD-10-PCS | Mod: HCNC,CPTII,S$GLB, | Performed by: FAMILY MEDICINE

## 2019-12-09 PROCEDURE — 3288F PR FALLS RISK ASSESSMENT DOCUMENTED: ICD-10-PCS | Mod: HCNC,CPTII,S$GLB, | Performed by: FAMILY MEDICINE

## 2019-12-09 RX ORDER — AMLODIPINE BESYLATE 5 MG/1
5 TABLET ORAL DAILY
COMMUNITY
End: 2020-10-13 | Stop reason: SDUPTHER

## 2019-12-09 NOTE — PROGRESS NOTES
Subjective:      Patient ID: Avril Monzon is a 74 y.o. female.    Chief Complaint: Follow-up (ER f/u )    HPI    Patient here for ER follow up - [facility]   November 30  Had episode of syncope - Sugar level was over 500 and elevated blood pressure   Notes that she stopped her medications - just stopped because she thought she could do without them   Started taking her medications again  Feeling better - LOC hasn't happened since her discharge   Started norvasc 5 mg while in the hospital for better bp control  Taking 30 units of novolin - every morning - sugars are in the 130's now   Blood pressure improved - denies feeling light headed or dizziness, changes in vision, chest pain, sob, changes in urination   Doesn't need refills today   Hx of MI November of 2018, CVA x 2         Past Medical History:   Diagnosis Date    Acute hypoxemic respiratory failure 11/26/2018    Anemia     Arthritis     back, hand, knees    Back pain     Cataract     CKD stage G4/A3, GFR 15 - 29 and albumin creatinine ratio >300 mg/g     GFR 40% Jan 2014 and 33% ub 3/2014 ([facility])    Coronary artery disease involving native coronary artery of native heart 11/30/2018    Diabetic retinopathy     DM (diabetes mellitus) type II controlled, neurological manifestation     Exudative age-related macular degeneration of left eye with active choroidal neovascularization 2/5/2019    GERD (gastroesophageal reflux disease)     Glaucoma     Heart failure     Hyperlipidemia     Hypertension     Non-ST elevation MI (NSTEMI) 11/28/2018    NSTEMI (non-ST elevated myocardial infarction) 11/27/2018    Peripheral neuropathy     Polyneuropathy     Proteinuria     >6 mo     Seizures     [facility] 1/2014 -dick CT NRI showed small vessel    Stroke 2012,2014    Tobacco dependence     Type 2 diabetes with peripheral circulatory disorder, controlled        Past Surgical History:   Procedure Laterality Date    BREAST LUMPECTOMY Left     benign,  when patient was 15 years old    BREAST MASS EXCISION      ,benign, age 15.    CATHETERIZATION OF BOTH LEFT AND RIGHT HEART N/A 2018    Procedure: CATHETERIZATION, HEART, BOTH LEFT AND RIGHT;  Surgeon: Michelle Holman MD;  Location: [facility] CATH LAB;  Service: Cardiology;  Laterality: N/A;    CATHETERIZATION OF BOTH LEFT AND RIGHT HEART N/A 2018    Procedure: CATHETERIZATION, HEART, BOTH LEFT AND RIGHT;  Surgeon: Michelle Holman MD;  Location: [facility] CATH LAB;  Service: Cardiology;  Laterality: N/A;    HYSTERECTOMY  1982    LEFT HEART CATHETERIZATION Left 12/3/2018    Procedure: CATHETERIZATION, HEART, LEFT;  Surgeon: Michelle Holman MD;  Location: [facility] CATH LAB;  Service: Cardiology;  Laterality: Left;  LAD ATHERECTOMY STENT/ FEMORAL APPROACH    OOPHORECTOMY      wrist cyst Right 1980s    dorsal wrist cyst       Family History   Problem Relation Age of Onset    Diabetes Mother     Hyperlipidemia Mother     Hypertension Mother     Heart disease Mother         MI    Muscular dystrophy Son     Cancer Maternal Grandmother         colon       Social History     Socioeconomic History    Marital status:      Spouse name: Not on file    Number of children: Not on file    Years of education: Not on file    Highest education level: Not on file   Occupational History    Not on file   Social Needs    Financial resource strain: Not on file    Food insecurity:     Worry: Not on file     Inability: Not on file    Transportation needs:     Medical: Not on file     Non-medical: Not on file   Tobacco Use    Smoking status: Former Smoker     Packs/day: 1.50     Years: 30.00     Pack years: 45.00     Types: Cigarettes     Last attempt to quit: 1990     Years since quittin.9    Smokeless tobacco: Former User   Substance and Sexual Activity    Alcohol use: No     Alcohol/week: 0.0 standard drinks    Drug use: No    Sexual activity: Never   Lifestyle    Physical activity:     Days per  week: Not on file     Minutes per session: Not on file    Stress: Not at all   Relationships    Social connections:     Talks on phone: Not on file     Gets together: Not on file     Attends [employer] service: Not on file     Active member of club or organization: Not on file     Attends meetings of clubs or organizations: Not on file     Relationship status: Not on file   Other Topics Concern    Not on file   Social History Narrative    Not on file       Health Maintenance Topics with due status: Not Due       Topic Last Completion Date    TETANUS VACCINE 06/25/2012    Mammogram 12/12/2018    Eye Exam 02/05/2019    Lipid Panel 03/27/2019    Foot Exam 05/06/2019    High Dose Statin 12/09/2019       Medication List with Changes/Refills   Current Medications    AMLODIPINE (NORVASC) 5 MG TABLET    Take 5 mg by mouth once daily.    ASPIRIN (ECOTRIN) 81 MG EC TABLET    Take 1 tablet (81 mg total) by mouth once daily.    ATORVASTATIN (LIPITOR) 80 MG TABLET    Take 1 tablet (80 mg total) by mouth once daily.    BLOOD SUGAR DIAGNOSTIC STRP    1 strip by Misc.(Non-Drug; Combo Route) route 3 (three) times daily. relion ultima    CALCITRIOL (ROCALTROL) 0.25 MCG CAP    Take 1 capsule (0.25 mcg total) by mouth once daily.    CLOPIDOGREL (PLAVIX) 75 MG TABLET    Take 1 tablet (75 mg total) by mouth once daily.    INSULIN NPH-INSULIN REGULAR, 70/30, (NOVOLIN 70/30) 100 UNIT/ML (70-30) INJECTION    INJECT SUBCUTANEOUSLY 30 UNITS IN THE MORNING AND INJECT 25 UNITS IN THE EVENING    ISOSORBIDE-HYDRALAZINE 20-37.5 MG (BIDIL) 20-37.5 MG TAB    Take 2 tablets by mouth 3 (three) times daily.    LANCETS (ACCU-CHEK SOFTCLIX LANCETS) MISC    1 lancet by Misc.(Non-Drug; Combo Route) route 3 (three) times daily.    METHOCARBAMOL (ROBAXIN) 750 MG TAB    Take 500 mg by mouth 4 (four) times daily.    METOPROLOL TARTRATE (LOPRESSOR) 25 MG TABLET    Take 1 tablet (25 mg total) by mouth 2 (two) times daily.    NITROGLYCERIN (NITROSTAT) 0.4 MG  SL TABLET    Place 1 tablet (0.4 mg total) under the tongue every 5 (five) minutes as needed for Chest pain.    SODIUM BICARBONATE 325 MG TABLET    TAKE 1 TABLET BY MOUTH THREE TIMES DAILY    TORSEMIDE (DEMADEX) 20 MG TAB    Take 2 tablets (40 mg total) by mouth once daily.       Review of patient's allergies indicates:   Allergen Reactions    Codeine Swelling    Darvon [propoxyphene] Swelling       Review of Systems   Constitutional: Negative for chills, fever and malaise/fatigue.   HENT: Negative for ear pain.    Eyes: Negative for blurred vision and pain.   Respiratory: Negative for cough and shortness of breath.    Cardiovascular: Negative for chest pain and leg swelling.   Gastrointestinal: Negative for abdominal pain, constipation, diarrhea and nausea.   Genitourinary: Negative for dysuria.   Musculoskeletal: Negative for myalgias.   Skin: Negative for rash.   Neurological: Negative for loss of consciousness and headaches.       Objective:     Vitals:    12/09/19 1343   BP: (!) 137/57   Pulse: 70   Temp: 98.1 °F (36.7 °C)     Body mass index is 22.95 kg/m².    Physical Exam   Constitutional: She is oriented to person, place, and time. She appears well-developed and well-nourished. No distress.   HENT:   Head: Normocephalic and atraumatic.   Right Ear: External ear normal.   Left Ear: External ear normal.   Nose: Nose normal.   Mouth/Throat: Oropharynx is clear and moist.   Eyes: Pupils are equal, round, and reactive to light. Conjunctivae and EOM are normal.   Neck: No thyromegaly present.   Cardiovascular: Normal rate, regular rhythm, normal heart sounds and intact distal pulses.   No murmur heard.  Pulmonary/Chest: Effort normal and breath sounds normal. No respiratory distress. She has no wheezes. She has no rales. She exhibits no tenderness.   Abdominal: Soft. Bowel sounds are normal. She exhibits no distension. There is no tenderness.   Musculoskeletal: She exhibits no edema.   Lymphadenopathy:     She  has no cervical adenopathy.   Neurological: She is alert and oriented to person, place, and time. She displays normal reflexes. No cranial nerve deficit or sensory deficit. She exhibits normal muscle tone. Coordination normal.   Skin: Skin is warm and dry.   Psychiatric: She has a normal mood and affect.   Nursing note and vitals reviewed.      Assessment and Plan:     Hospital discharge follow-up secondary to hyperglycemia   Improved     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, with long-term current use of insulin  Educated on importance of taking medications daily  Continue with diet changes and daily glucose checks   rtc if sugar levels begin to rise  Will check a1c  -     Hemoglobin A1c; Future; Expected date: 12/09/2019    Hypertension associated with diabetes  Blood pressure controlled at today's visit   Continue with current medications   Ambulatory logs of blood pressure readings recommended   DASH diet, weight loss, exercise     History of CVA (cerebrovascular accident)  Continue with statin, plavix, aspirin     History of MI   Continue with asa, statin, bb, plavix     Will reschedule patient appointment with heme/onc for follow up of anemia as she missed due to recent hospitalization     Follow up if symptoms worsen or fail to improve.    @@

## 2019-12-10 LAB
ESTIMATED AVG GLUCOSE: 324 MG/DL (ref 68–131)
HBA1C MFR BLD HPLC: 12.9 % (ref 4–5.6)

## 2019-12-19 ENCOUNTER — PATIENT OUTREACH (OUTPATIENT)
Dept: ADMINISTRATIVE | Facility: HOSPITAL | Age: 74
End: 2019-12-19

## 2019-12-19 NOTE — PROGRESS NOTES
Pt last A1c 12.9  Pt restarted meds and will repeat in 3 months       Maricel JARRELL LPN Care Coordinator  Care Coordination Department  [facility]  [phone]

## 2020-01-03 RX ORDER — CLOPIDOGREL BISULFATE 75 MG/1
TABLET ORAL
Qty: 30 TABLET | Refills: 6 | Status: SHIPPED | OUTPATIENT
Start: 2020-01-03 | End: 2020-01-12

## 2020-01-07 ENCOUNTER — PES CALL (OUTPATIENT)
Dept: ADMINISTRATIVE | Facility: CLINIC | Age: 75
End: 2020-01-07

## 2020-01-12 RX ORDER — METOPROLOL TARTRATE 25 MG/1
TABLET, FILM COATED ORAL
Qty: 60 TABLET | Refills: 6 | Status: SHIPPED | OUTPATIENT
Start: 2020-01-12 | End: 2020-09-03

## 2020-01-12 RX ORDER — CLOPIDOGREL BISULFATE 75 MG/1
TABLET ORAL
Qty: 39 TABLET | Refills: 6 | Status: ON HOLD | OUTPATIENT
Start: 2020-01-12 | End: 2021-12-07 | Stop reason: SDUPTHER

## 2020-01-17 ENCOUNTER — OFFICE VISIT (OUTPATIENT)
Dept: FAMILY MEDICINE | Facility: CLINIC | Age: 75
End: 2020-01-17
Payer: MEDICARE

## 2020-01-17 ENCOUNTER — LAB VISIT (OUTPATIENT)
Dept: LAB | Facility: HOSPITAL | Age: 75
End: 2020-01-17
Attending: INTERNAL MEDICINE
Payer: MEDICARE

## 2020-01-17 VITALS
TEMPERATURE: 98 F | RESPIRATION RATE: 20 BRPM | HEIGHT: 66 IN | WEIGHT: 142.63 LBS | OXYGEN SATURATION: 98 % | DIASTOLIC BLOOD PRESSURE: 80 MMHG | HEART RATE: 70 BPM | BODY MASS INDEX: 22.92 KG/M2 | SYSTOLIC BLOOD PRESSURE: 186 MMHG

## 2020-01-17 DIAGNOSIS — N18.4 ANEMIA IN STAGE 4 CHRONIC KIDNEY DISEASE: ICD-10-CM

## 2020-01-17 DIAGNOSIS — D63.1 ANEMIA IN STAGE 4 CHRONIC KIDNEY DISEASE: ICD-10-CM

## 2020-01-17 DIAGNOSIS — D63.1 ANEMIA IN STAGE 4 CHRONIC KIDNEY DISEASE: Chronic | ICD-10-CM

## 2020-01-17 DIAGNOSIS — E11.42 CONTROLLED TYPE 2 DIABETES MELLITUS WITH DIABETIC POLYNEUROPATHY, WITH LONG-TERM CURRENT USE OF INSULIN: Chronic | ICD-10-CM

## 2020-01-17 DIAGNOSIS — Z79.4 CONTROLLED TYPE 2 DIABETES MELLITUS WITH DIABETIC POLYNEUROPATHY, WITH LONG-TERM CURRENT USE OF INSULIN: Chronic | ICD-10-CM

## 2020-01-17 DIAGNOSIS — E11.3553 TYPE 2 DIABETES MELLITUS WITH STABLE PROLIFERATIVE RETINOPATHY OF BOTH EYES, WITH LONG-TERM CURRENT USE OF INSULIN: ICD-10-CM

## 2020-01-17 DIAGNOSIS — E11.59 HYPERTENSION ASSOCIATED WITH DIABETES: Chronic | ICD-10-CM

## 2020-01-17 DIAGNOSIS — Z86.73 HISTORY OF CVA (CEREBROVASCULAR ACCIDENT): ICD-10-CM

## 2020-01-17 DIAGNOSIS — I50.42 CHRONIC COMBINED SYSTOLIC AND DIASTOLIC CONGESTIVE HEART FAILURE: ICD-10-CM

## 2020-01-17 DIAGNOSIS — I27.20 PULMONARY HYPERTENSION: ICD-10-CM

## 2020-01-17 DIAGNOSIS — I70.0 AORTIC ATHEROSCLEROSIS: ICD-10-CM

## 2020-01-17 DIAGNOSIS — N18.4 ANEMIA IN STAGE 4 CHRONIC KIDNEY DISEASE: Chronic | ICD-10-CM

## 2020-01-17 DIAGNOSIS — Z79.4 TYPE 2 DIABETES MELLITUS WITH STABLE PROLIFERATIVE RETINOPATHY OF BOTH EYES, WITH LONG-TERM CURRENT USE OF INSULIN: ICD-10-CM

## 2020-01-17 DIAGNOSIS — N18.4 CHRONIC KIDNEY DISEASE (CKD) STAGE G4/A3, SEVERELY DECREASED GLOMERULAR FILTRATION RATE (GFR) BETWEEN 15-29 ML/MIN/1.73 SQUARE METER AND ALBUMINURIA CREATININE RATIO GREATER THAN 300 MG/G: ICD-10-CM

## 2020-01-17 DIAGNOSIS — E11.69 HYPERLIPIDEMIA ASSOCIATED WITH TYPE 2 DIABETES MELLITUS: ICD-10-CM

## 2020-01-17 DIAGNOSIS — Z00.00 ENCOUNTER FOR PREVENTIVE HEALTH EXAMINATION: ICD-10-CM

## 2020-01-17 DIAGNOSIS — E78.5 HYPERLIPIDEMIA ASSOCIATED WITH TYPE 2 DIABETES MELLITUS: ICD-10-CM

## 2020-01-17 DIAGNOSIS — E11.51 TYPE 2 DIABETES MELLITUS WITH DIABETIC PERIPHERAL ANGIOPATHY WITHOUT GANGRENE, WITH LONG-TERM CURRENT USE OF INSULIN: Primary | ICD-10-CM

## 2020-01-17 DIAGNOSIS — I15.2 HYPERTENSION ASSOCIATED WITH DIABETES: Chronic | ICD-10-CM

## 2020-01-17 DIAGNOSIS — E21.3 HYPERPARATHYROIDISM: ICD-10-CM

## 2020-01-17 DIAGNOSIS — I25.111 CORONARY ARTERY DISEASE INVOLVING NATIVE CORONARY ARTERY OF NATIVE HEART WITH ANGINA PECTORIS WITH DOCUMENTED SPASM: ICD-10-CM

## 2020-01-17 DIAGNOSIS — I65.23 BILATERAL CAROTID ARTERY STENOSIS: ICD-10-CM

## 2020-01-17 DIAGNOSIS — Z79.4 TYPE 2 DIABETES MELLITUS WITH DIABETIC PERIPHERAL ANGIOPATHY WITHOUT GANGRENE, WITH LONG-TERM CURRENT USE OF INSULIN: Primary | ICD-10-CM

## 2020-01-17 PROBLEM — H04.129 DRYNESS, EYE: Status: RESOLVED | Noted: 2019-02-05 | Resolved: 2020-01-17

## 2020-01-17 PROBLEM — J96.01 ACUTE HYPOXEMIC RESPIRATORY FAILURE: Status: RESOLVED | Noted: 2018-11-26 | Resolved: 2020-01-17

## 2020-01-17 LAB
BASOPHILS # BLD AUTO: 0.01 K/UL (ref 0–0.2)
BASOPHILS NFR BLD: 0.2 % (ref 0–1.9)
DIFFERENTIAL METHOD: ABNORMAL
EOSINOPHIL # BLD AUTO: 0.1 K/UL (ref 0–0.5)
EOSINOPHIL NFR BLD: 1.7 % (ref 0–8)
ERYTHROCYTE [DISTWIDTH] IN BLOOD BY AUTOMATED COUNT: 12.8 % (ref 11.5–14.5)
HCT VFR BLD AUTO: 28.2 % (ref 37–48.5)
HGB BLD-MCNC: 8.9 G/DL (ref 12–16)
IMM GRANULOCYTES # BLD AUTO: 0.01 K/UL (ref 0–0.04)
IMM GRANULOCYTES NFR BLD AUTO: 0.2 % (ref 0–0.5)
LYMPHOCYTES # BLD AUTO: 0.9 K/UL (ref 1–4.8)
LYMPHOCYTES NFR BLD: 18.8 % (ref 18–48)
MCH RBC QN AUTO: 30.6 PG (ref 27–31)
MCHC RBC AUTO-ENTMCNC: 31.6 G/DL (ref 32–36)
MCV RBC AUTO: 97 FL (ref 82–98)
MONOCYTES # BLD AUTO: 0.3 K/UL (ref 0.3–1)
MONOCYTES NFR BLD: 6.7 % (ref 4–15)
NEUTROPHILS # BLD AUTO: 3.5 K/UL (ref 1.8–7.7)
NEUTROPHILS NFR BLD: 72.4 % (ref 38–73)
NRBC BLD-RTO: 0 /100 WBC
PLATELET # BLD AUTO: 177 K/UL (ref 150–350)
PMV BLD AUTO: 10.2 FL (ref 9.2–12.9)
RBC # BLD AUTO: 2.91 M/UL (ref 4–5.4)
WBC # BLD AUTO: 4.79 K/UL (ref 3.9–12.7)

## 2020-01-17 PROCEDURE — 3079F PR MOST RECENT DIASTOLIC BLOOD PRESSURE 80-89 MM HG: ICD-10-PCS | Mod: HCNC,CPTII,S$GLB, | Performed by: NURSE PRACTITIONER

## 2020-01-17 PROCEDURE — G0439 PPPS, SUBSEQ VISIT: HCPCS | Mod: HCNC,S$GLB,, | Performed by: NURSE PRACTITIONER

## 2020-01-17 PROCEDURE — 3046F PR MOST RECENT HEMOGLOBIN A1C LEVEL > 9.0%: ICD-10-PCS | Mod: HCNC,CPTII,S$GLB, | Performed by: NURSE PRACTITIONER

## 2020-01-17 PROCEDURE — 3077F PR MOST RECENT SYSTOLIC BLOOD PRESSURE >= 140 MM HG: ICD-10-PCS | Mod: HCNC,CPTII,S$GLB, | Performed by: NURSE PRACTITIONER

## 2020-01-17 PROCEDURE — 36415 COLL VENOUS BLD VENIPUNCTURE: CPT | Mod: HCNC,PO

## 2020-01-17 PROCEDURE — 3079F DIAST BP 80-89 MM HG: CPT | Mod: HCNC,CPTII,S$GLB, | Performed by: NURSE PRACTITIONER

## 2020-01-17 PROCEDURE — 85025 COMPLETE CBC W/AUTO DIFF WBC: CPT | Mod: HCNC

## 2020-01-17 PROCEDURE — 83540 ASSAY OF IRON: CPT | Mod: HCNC

## 2020-01-17 PROCEDURE — 99499 RISK ADDL DX/OHS AUDIT: ICD-10-PCS | Mod: HCNC,S$GLB,, | Performed by: NURSE PRACTITIONER

## 2020-01-17 PROCEDURE — 3046F HEMOGLOBIN A1C LEVEL >9.0%: CPT | Mod: HCNC,CPTII,S$GLB, | Performed by: NURSE PRACTITIONER

## 2020-01-17 PROCEDURE — 3077F SYST BP >= 140 MM HG: CPT | Mod: HCNC,CPTII,S$GLB, | Performed by: NURSE PRACTITIONER

## 2020-01-17 PROCEDURE — G0439 PR MEDICARE ANNUAL WELLNESS SUBSEQUENT VISIT: ICD-10-PCS | Mod: HCNC,S$GLB,, | Performed by: NURSE PRACTITIONER

## 2020-01-17 PROCEDURE — 82728 ASSAY OF FERRITIN: CPT | Mod: HCNC

## 2020-01-17 PROCEDURE — 99999 PR PBB SHADOW E&M-EST. PATIENT-LVL III: ICD-10-PCS | Mod: PBBFAC,HCNC,, | Performed by: NURSE PRACTITIONER

## 2020-01-17 PROCEDURE — 99499 UNLISTED E&M SERVICE: CPT | Mod: HCNC,S$GLB,, | Performed by: NURSE PRACTITIONER

## 2020-01-17 PROCEDURE — 99999 PR PBB SHADOW E&M-EST. PATIENT-LVL III: CPT | Mod: PBBFAC,HCNC,, | Performed by: NURSE PRACTITIONER

## 2020-01-17 NOTE — PROGRESS NOTES
"Avril Monzon presented for a  Medicare AWV and comprehensive Health Risk Assessment today. The following components were reviewed and updated:    · Medical history  · Family History  · Social history  · Allergies and Current Medications  · Health Risk Assessment  · Health Maintenance  · Care Team     ** See Completed Assessments for Annual Wellness Visit within the encounter summary.**       The following assessments were completed:  · Living Situation  · CAGE  · Depression Screening  · Timed Get Up and Go  · Whisper Test  · Cognitive Function Screening  · Nutrition Screening  · ADL Screening  · PAQ Screening    Vitals:    01/17/20 1330   BP: (!) 186/80   Pulse: 70   Resp: 20   Temp: 98 °F (36.7 °C)   SpO2: 98%   Weight: 64.7 kg (142 lb 10.2 oz)   Height: 5' 6" (1.676 m)     Body mass index is 23.02 kg/m².  Physical Exam   Constitutional: She appears well-developed.   HENT:   Head: Normocephalic and atraumatic.   Eyes: Pupils are equal, round, and reactive to light.   Neck: Carotid bruit is not present.   Cardiovascular: Normal rate, regular rhythm, normal heart sounds, intact distal pulses and normal pulses. Exam reveals no gallop.   No murmur heard.  Pulmonary/Chest: Effort normal and breath sounds normal.   Abdominal: Soft. Normal appearance and bowel sounds are normal. She exhibits no distension. There is no tenderness.   Musculoskeletal: Normal range of motion. She exhibits no edema or tenderness.   Neurological: She is alert. She exhibits normal muscle tone. Gait normal.   Skin: Skin is warm, dry and intact.   Psychiatric: She has a normal mood and affect. Her speech is normal and behavior is normal. Judgment and thought content normal. Cognition and memory are normal.   Nursing note and vitals reviewed.      Current Outpatient Medications:     amLODIPine (NORVASC) 5 MG tablet, Take 5 mg by mouth once daily., Disp: , Rfl:     atorvastatin (LIPITOR) 80 MG tablet, Take 1 tablet (80 mg total) by mouth once " daily., Disp: 30 tablet, Rfl: 11    calcitRIOL (ROCALTROL) 0.25 MCG Cap, Take 1 capsule (0.25 mcg total) by mouth once daily., Disp: 30 capsule, Rfl: 11    clopidogrel (PLAVIX) 75 mg tablet, TAKE 1 TABLET BY MOUTH ONCE DAILY, Disp: 39 tablet, Rfl: 6    insulin NPH-insulin regular, 70/30, (NOVOLIN 70/30) 100 unit/mL (70-30) injection, INJECT SUBCUTANEOUSLY 30 UNITS IN THE MORNING AND INJECT 25 UNITS IN THE EVENING (Patient taking differently: INJECT SUBCUTANEOUSLY 30 UNITS IN THE MORNING AND INJECT 25 UNITS IN THE EVENING), Disp: 2 vial, Rfl: 3    isosorbide-hydrALAZINE 20-37.5 mg (BIDIL) 20-37.5 mg Tab, Take 2 tablets by mouth 3 (three) times daily., Disp: 180 tablet, Rfl: 11    lancets (ACCU-CHEK SOFTCLIX LANCETS) Misc, 1 lancet by Misc.(Non-Drug; Combo Route) route 3 (three) times daily., Disp: 100 each, Rfl: 11    methocarbamol (ROBAXIN) 750 MG Tab, Take 500 mg by mouth 4 (four) times daily., Disp: , Rfl:     metoprolol tartrate (LOPRESSOR) 25 MG tablet, TAKE 1 TABLET BY MOUTH TWICE DAILY, Disp: 60 tablet, Rfl: 6    nitroGLYCERIN (NITROSTAT) 0.4 MG SL tablet, Place 1 tablet (0.4 mg total) under the tongue every 5 (five) minutes as needed for Chest pain., Disp: 20 tablet, Rfl: 0    sodium bicarbonate 325 MG tablet, TAKE 1 TABLET BY MOUTH THREE TIMES DAILY, Disp: 90 tablet, Rfl: 11    torsemide (DEMADEX) 20 MG Tab, Take 2 tablets (40 mg total) by mouth once daily., Disp: 180 tablet, Rfl: 3    aspirin (ECOTRIN) 81 MG EC tablet, Take 1 tablet (81 mg total) by mouth once daily., Disp: , Rfl: 0    blood sugar diagnostic Strp, 1 strip by Misc.(Non-Drug; Combo Route) route 3 (three) times daily. relion ultima, Disp: 100 strip, Rfl: 11    Current Facility-Administered Medications:     aflibercept Soln 2 mg, 2 mg, Intravitreal, 1 time in Clinic/HOD, JADIEL Coello, MD    Diagnoses and health risks identified today and associated recommendations/orders:    1. Encounter for preventive health  examination  Completed    2. Type 2 diabetes mellitus with diabetic peripheral angiopathy without gangrene, with long-term current use of insulin  Component      Latest Ref Rng & Units 12/9/2019   Hemoglobin A1C External      4.0 - 5.6 % 12.9 (H)   Chronic and Ongoing.  Pt states she has been compliant witb diet and DM medications since the ER  Visit  12/9/ 2020.. States bloodsurgars in 100-200's  Pt encourage cont meds and diet and Follow up with PCP 3/ 2020  For a repeat HBgaic    3. Hypertension associated with diabetes  This problem is currently not controlled. Pt states she does not take her Bidil 3 x a day- instructed to take all medications as prescribed and  Pt referrred back to nephrologist for HTN/CKD on 1/31/2020    4. Chronic kidney disease (CKD) stage G4/A3, severely decreased glomerular filtration rate (GFR) between 15-29 mL/min/1.73 square meter and albuminuria creatinine ratio greater than 300 mg/g  Component      Latest Ref Rng & Units 11/8/2019   Creatinine      0.5 - 1.4 mg/dL 5.3 (H)   eGFR if African American      >60 mL/min/1.73 m:2 9 (A)   eGFR if non African American      >60 mL/min/1.73 m:2 8 (A)   Chronic and Ongoing.  Pt referred back to nephrologist on 1/31/2020 for evaluation     5. Anemia in stage 4 chronic kidney disease  Component      Latest Ref Rng & Units 11/8/2019   RBC      4.00 - 5.40 M/uL 3.26 (L)   Hemoglobin      12.0 - 16.0 g/dL 9.7 (L)   Hematocrit      37.0 - 48.5 % 30.2 (L)   MCV      82 - 98 fL 93   Chronic and Ongoing. repeat labs today and pt schedule hemolotogist on 1/22/20     6. Controlled type 2 diabetes mellitus with diabetic polyneuropathy, with long-term current use of insulin  Chronic and Ongoing. Continue current treatment plan as previously prescribed with your PCP    7. 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, with long-term current use of insulin  Chronic and Ongoing. Due for an appt . Pt states she will call and set up  appt    8. Bilateral carotid artery stenosis  1/4/ 2019  cartiod u/sThere is 40 - 49% right Internal Carotid stenosis. There is 40 - 49% left Internal Carotid stenosis.  Chronic and Ongoing. Message sent to cardiologist  To reschedule all pt missied test - pt missed all sheduled testing on  12/18/ 2019     9. Aortic atherosclerosis  Chronic and Ongoing. Continue current treatment plan as previously prescribed with your PCP and cardiologist     10. Hyperparathyroidism  Component      Latest Ref Rng & Units 5/15/2019   PTH      9.0 - 77.0 pg/mL 414.0 (H)   Chronic and Ongoing.  Pt referred back to nephrologist on 1/31/20     11. Hyperlipidemia associated with type 2 diabetes  Component      Latest Ref Rng & Units 3/27/2019   Cholesterol      120 - 199 mg/dL 150   Triglycerides      30 - 150 mg/dL 75   HDL      40 - 75 mg/dL 45   LDL Cholesterol External      63.0 - 159.0 mg/dL 90.0   Hdl/Cholesterol Ratio      20.0 - 50.0 % 30.0   Total Cholesterol/HDL Ratio      2.0 - 5.0 3.3   Non-HDL Cholesterol      mg/dL 105   Chronic and Ongoing. Continue current treatment plan as previously prescribed with your PCP    12. Coronary artery disease involving native coronary artery of native heart with angina pectoris with documented spasm  Chronic and Ongoing. Message sent to cardiologist  To reschedule all pt missied test - pt missed all sheduled testing on  12/18/ 2019     13. Pulmonary hypertension  11/ 2018 echo Mildly depressed left ventricular systolic function (EF 45-50%).     3 - Impaired LV relaxation, normal LAP (grade 1 diastolic dysfunction).     4 - Normal right ventricular systolic function .     5 - Pulmonary hypertension. The estimated PA systolic pressure is 69 mmHg.     6 - Mild to moderate mitral regurgitation.     7 - Mild tricuspid regurgitation.     8 - Intermediate central venous pressure.     9 - Right pleural effusion  Chronic and Ongoing. Message sent to cardiologist  To reschedule all pt missied test -  pt missed all sheduled testing on  12/18/ 2019     14. Chronic combined systolic and diastolic congestive heart failure  11/ 2018 echo Mildly depressed left ventricular systolic function (EF 45-50%).     3 - Impaired LV relaxation, normal LAP (grade 1 diastolic dysfunction).   Chronic and Ongoing. Message sent to cardiologist  To reschedule all pt missied test - pt missed all sheduled testing on  12/18/ 2019     15. History of CVA (cerebrovascular accident)  Chronic and Ongoing  See the above . Continue current treatment plan as previously prescribed with your PCP, cardiologist and neurologist    I offered to discuss end of life issues, including information on how to make advance directives that the patient could use to name someone who would make medical decisions on their behalf if they became too ill to make themselves  _X_Patient is interested, I provided paper work and offered to discuss.    Provided Avril with a 5-10 year written screening schedule and personal prevention plan. Recommendations were developed using the USPSTF age appropriate recommendations. Education, counseling, and referrals were provided as needed. After Visit Summary printed and given to patient which includes a list of additional screenings\tests needed.    1 year follow up     Jannet Don NP

## 2020-01-17 NOTE — PATIENT INSTRUCTIONS
Counseling and Referral of Other Preventative  (Italic type indicates deductible and co-insurance are waived)    Patient Name: Avril Monzon  Today's Date: 1/17/2020    Health Maintenance       Date Due Completion Date    DEXA SCAN 02/04/1987 ---    Shingles Vaccine (1 of 2) 02/04/1997 ---    Colonoscopy 02/04/1997 ---    Eye Exam 02/05/2020 2/5/2019 (Done)    Override on 2/5/2019: Done    Override on 4/22/2016: Done    Override on 9/1/2013: Done    Lipid Panel 03/27/2020 3/27/2019    Foot Exam 05/06/2020 5/6/2019 (Done)    Override on 5/6/2019: Done    Override on 1/27/2016: Done    Override on 8/21/2014: Done    Override on 3/26/2014: Done    Override on 1/21/2014: Done    Hemoglobin A1c 06/09/2020 12/9/2019    High Dose Statin 12/09/2020 12/9/2019    Mammogram 12/12/2020 12/12/2018    TETANUS VACCINE 06/25/2022 6/25/2012        No orders of the defined types were placed in this encounter.    The following information is provided to all patients.  This information is to help you find resources for any of the problems found today that may be affecting your health:                Living healthy guide: www.[facility].[place].gov      Understanding Diabetes: www.diabetes.org      Eating healthy: www.cdc.gov/healthyweight      CDC home safety checklist: www.cdc.gov/steadi/patient.html      Agency on Aging: www.goea.[place].[facility]      Alcoholics anonymous (AA): www.aa.org      Physical Activity: www.pawan.nih.gov/xy7jxnr      Tobacco use: www.quitwithusla.org

## 2020-01-17 NOTE — Clinical Note
Your patient was seen today for a HRA visit. Please review HRA notes I have included a copy of my visit note, please review the note and feel free to contact me with any questions. Thank you for allowing me to participate in the care of your patients. Jannet Don NP

## 2020-01-18 LAB
FERRITIN SERPL-MCNC: 581 NG/ML (ref 20–300)
IRON SERPL-MCNC: 55 UG/DL (ref 30–160)
SATURATED IRON: 20 % (ref 20–50)
TOTAL IRON BINDING CAPACITY: 280 UG/DL (ref 250–450)
TRANSFERRIN SERPL-MCNC: 189 MG/DL (ref 200–375)

## 2020-01-22 ENCOUNTER — OFFICE VISIT (OUTPATIENT)
Dept: HEMATOLOGY/ONCOLOGY | Facility: CLINIC | Age: 75
End: 2020-01-22
Payer: MEDICARE

## 2020-01-22 VITALS
TEMPERATURE: 98 F | BODY MASS INDEX: 22.89 KG/M2 | RESPIRATION RATE: 18 BRPM | OXYGEN SATURATION: 99 % | HEART RATE: 81 BPM | HEIGHT: 66 IN | WEIGHT: 142.44 LBS | SYSTOLIC BLOOD PRESSURE: 159 MMHG | DIASTOLIC BLOOD PRESSURE: 77 MMHG

## 2020-01-22 DIAGNOSIS — Z79.4 TYPE 2 DIABETES MELLITUS WITH STABLE PROLIFERATIVE RETINOPATHY OF BOTH EYES, WITH LONG-TERM CURRENT USE OF INSULIN: ICD-10-CM

## 2020-01-22 DIAGNOSIS — D50.0 IRON DEFICIENCY ANEMIA DUE TO CHRONIC BLOOD LOSS: ICD-10-CM

## 2020-01-22 DIAGNOSIS — D63.1 ANEMIA DUE TO STAGE 5 CHRONIC KIDNEY DISEASE: Primary | ICD-10-CM

## 2020-01-22 DIAGNOSIS — N18.5 ANEMIA DUE TO STAGE 5 CHRONIC KIDNEY DISEASE: Primary | ICD-10-CM

## 2020-01-22 DIAGNOSIS — N18.4 ANEMIA IN STAGE 4 CHRONIC KIDNEY DISEASE: ICD-10-CM

## 2020-01-22 DIAGNOSIS — E11.59 HYPERTENSION ASSOCIATED WITH DIABETES: ICD-10-CM

## 2020-01-22 DIAGNOSIS — I15.2 HYPERTENSION ASSOCIATED WITH DIABETES: ICD-10-CM

## 2020-01-22 DIAGNOSIS — D63.1 ANEMIA IN STAGE 4 CHRONIC KIDNEY DISEASE: ICD-10-CM

## 2020-01-22 DIAGNOSIS — E11.3553 TYPE 2 DIABETES MELLITUS WITH STABLE PROLIFERATIVE RETINOPATHY OF BOTH EYES, WITH LONG-TERM CURRENT USE OF INSULIN: ICD-10-CM

## 2020-01-22 DIAGNOSIS — I16.1 HYPERTENSIVE EMERGENCY: ICD-10-CM

## 2020-01-22 PROCEDURE — 3046F HEMOGLOBIN A1C LEVEL >9.0%: CPT | Mod: HCNC,CPTII,S$GLB, | Performed by: INTERNAL MEDICINE

## 2020-01-22 PROCEDURE — 1101F PT FALLS ASSESS-DOCD LE1/YR: CPT | Mod: HCNC,CPTII,S$GLB, | Performed by: INTERNAL MEDICINE

## 2020-01-22 PROCEDURE — 3078F DIAST BP <80 MM HG: CPT | Mod: HCNC,CPTII,S$GLB, | Performed by: INTERNAL MEDICINE

## 2020-01-22 PROCEDURE — 3046F PR MOST RECENT HEMOGLOBIN A1C LEVEL > 9.0%: ICD-10-PCS | Mod: HCNC,CPTII,S$GLB, | Performed by: INTERNAL MEDICINE

## 2020-01-22 PROCEDURE — 3078F PR MOST RECENT DIASTOLIC BLOOD PRESSURE < 80 MM HG: ICD-10-PCS | Mod: HCNC,CPTII,S$GLB, | Performed by: INTERNAL MEDICINE

## 2020-01-22 PROCEDURE — 1126F AMNT PAIN NOTED NONE PRSNT: CPT | Mod: HCNC,S$GLB,, | Performed by: INTERNAL MEDICINE

## 2020-01-22 PROCEDURE — 1126F PR PAIN SEVERITY QUANTIFIED, NO PAIN PRESENT: ICD-10-PCS | Mod: HCNC,S$GLB,, | Performed by: INTERNAL MEDICINE

## 2020-01-22 PROCEDURE — 99214 OFFICE O/P EST MOD 30 MIN: CPT | Mod: HCNC,S$GLB,, | Performed by: INTERNAL MEDICINE

## 2020-01-22 PROCEDURE — 99499 RISK ADDL DX/OHS AUDIT: ICD-10-PCS | Mod: S$GLB,,, | Performed by: INTERNAL MEDICINE

## 2020-01-22 PROCEDURE — 99999 PR PBB SHADOW E&M-EST. PATIENT-LVL V: ICD-10-PCS | Mod: PBBFAC,HCNC,, | Performed by: INTERNAL MEDICINE

## 2020-01-22 PROCEDURE — 3077F PR MOST RECENT SYSTOLIC BLOOD PRESSURE >= 140 MM HG: ICD-10-PCS | Mod: HCNC,CPTII,S$GLB, | Performed by: INTERNAL MEDICINE

## 2020-01-22 PROCEDURE — 1159F PR MEDICATION LIST DOCUMENTED IN MEDICAL RECORD: ICD-10-PCS | Mod: HCNC,S$GLB,, | Performed by: INTERNAL MEDICINE

## 2020-01-22 PROCEDURE — 1159F MED LIST DOCD IN RCRD: CPT | Mod: HCNC,S$GLB,, | Performed by: INTERNAL MEDICINE

## 2020-01-22 PROCEDURE — 3077F SYST BP >= 140 MM HG: CPT | Mod: HCNC,CPTII,S$GLB, | Performed by: INTERNAL MEDICINE

## 2020-01-22 PROCEDURE — 99214 PR OFFICE/OUTPT VISIT, EST, LEVL IV, 30-39 MIN: ICD-10-PCS | Mod: HCNC,S$GLB,, | Performed by: INTERNAL MEDICINE

## 2020-01-22 PROCEDURE — 99499 UNLISTED E&M SERVICE: CPT | Mod: S$GLB,,, | Performed by: INTERNAL MEDICINE

## 2020-01-22 PROCEDURE — 1101F PR PT FALLS ASSESS DOC 0-1 FALLS W/OUT INJ PAST YR: ICD-10-PCS | Mod: HCNC,CPTII,S$GLB, | Performed by: INTERNAL MEDICINE

## 2020-01-22 PROCEDURE — 99999 PR PBB SHADOW E&M-EST. PATIENT-LVL V: CPT | Mod: PBBFAC,HCNC,, | Performed by: INTERNAL MEDICINE

## 2020-01-22 RX ORDER — HYDRALAZINE HYDROCHLORIDE 50 MG/1
1 TABLET, FILM COATED ORAL
COMMUNITY
End: 2020-01-31

## 2020-01-22 RX ORDER — FUROSEMIDE 20 MG/1
TABLET ORAL
Status: ON HOLD | COMMUNITY
End: 2020-07-04 | Stop reason: HOSPADM

## 2020-01-22 RX ORDER — ONDANSETRON 4 MG/1
TABLET, ORALLY DISINTEGRATING ORAL
Refills: 0 | COMMUNITY
Start: 2019-10-28 | End: 2021-12-06

## 2020-01-22 RX ORDER — BACLOFEN 10 MG/1
TABLET ORAL
Refills: 0 | COMMUNITY
Start: 2019-10-16 | End: 2020-01-31

## 2020-01-22 RX ORDER — LISINOPRIL 20 MG/1
2 TABLET ORAL
COMMUNITY
End: 2020-01-31

## 2020-01-22 NOTE — PROGRESS NOTES
Subjective:      DATE OF VISIT: 1/22/2020   ?   ?   Patient ID:?Avril Monzon is a 74 y.o. female.?? MR#: [id number]   ?   PRIMARY PROVIDER: Dr. De La Cruz    CHIEF COMPLAINT:  Follow-up  ?   DIAGNOSIS:  Anemia of chronic kidney disease?????   ?   CURRENT TREATMENT:  DIANE, retacrit 56554 units every other week for hemoglobin less than 10      HPI    I had the pleasure meeting for the 1st time Ms. Monzon who returns for follow-up.  She was previously seen initially by Dr. Fletcher and at last visit by Dr. Oseguera.  She has been followed for anemia of chronic kidney disease in the setting of type 2 diabetes and hypertension.  She has been receiving 64942 units of Procrit as needed.  She has not received since May 2018 due to hemoglobin over 10.  She denies change in fatigue/energy level.  No bleeding or bruising.      She comes today for her lab recheck showing hemoglobin less than 10 indication for DIANE however due to insurance will need to change from Procrit to Retacrit formulation.    Yesterday evening she developed headache with blood pressure to 60/102 and presented to Emergency Department at [facility] and was given IV antihypertensive agent with improvement.  Blood pressure is today 159/77 and she notes resolution of headache.  No visual change or other neurologic symptoms.      Review of Systems    ?   A comprehensive 14-point review of systems was reviewed with patient and was negative other than as specified above.   ?     Objective:      Physical Exam      ?   Vitals:    01/22/20 1148   BP: (!) 159/77   Pulse: 81   Resp: 18   Temp: 98.2 °F (36.8 °C)      ?   ECOG:?.0    General appearance: Generally well appearing, in no acute distress.   Head, eyes, ears, nose, and throat: Oropharynx clear with moist mucous membranes.   Cardiovascular: Regular rate and rhythm, S1, S2, no audible murmurs.   Respiratory: Lungs clear to auscultation bilaterally.   Abdomen: nontender, nondistended.   Extremities: Warm,  without edema.   Neurologic: Alert and oriented. Grossly normal strength, coordination, and gait.   Skin: No rashes, ecchymoses or petechial lesion.   Psychiatric: normal mood and affect, conversant and appropriate    ?   Laboratory:  ?   No visits with results within 1 Day(s) from this visit.   Latest known visit with results is:   Lab Visit on 01/17/2020   Component Date Value Ref Range Status    Ferritin 01/17/2020 581* 20.0 - 300.0 ng/mL Final    Iron 01/17/2020 55  30 - 160 ug/dL Final    Transferrin 01/17/2020 189* 200 - 375 mg/dL Final    TIBC 01/17/2020 280  250 - 450 ug/dL Final    Saturated Iron 01/17/2020 20  20 - 50 % Final    WBC 01/17/2020 4.79  3.90 - 12.70 K/uL Final    RBC 01/17/2020 2.91* 4.00 - 5.40 M/uL Final    Hemoglobin 01/17/2020 8.9* 12.0 - 16.0 g/dL Final    Hematocrit 01/17/2020 28.2* 37.0 - 48.5 % Final    Mean Corpuscular Volume 01/17/2020 97  82 - 98 fL Final    Mean Corpuscular Hemoglobin 01/17/2020 30.6  27.0 - 31.0 pg Final    Mean Corpuscular Hemoglobin Conc 01/17/2020 31.6* 32.0 - 36.0 g/dL Final    RDW 01/17/2020 12.8  11.5 - 14.5 % Final    Platelets 01/17/2020 177  150 - 350 K/uL Final    MPV 01/17/2020 10.2  9.2 - 12.9 fL Final    Immature Granulocytes 01/17/2020 0.2  0.0 - 0.5 % Final    Gran # (ANC) 01/17/2020 3.5  1.8 - 7.7 K/uL Final    Immature Grans (Abs) 01/17/2020 0.01  0.00 - 0.04 K/uL Final    Lymph # 01/17/2020 0.9* 1.0 - 4.8 K/uL Final    Mono # 01/17/2020 0.3  0.3 - 1.0 K/uL Final    Eos # 01/17/2020 0.1  0.0 - 0.5 K/uL Final    Baso # 01/17/2020 0.01  0.00 - 0.20 K/uL Final    nRBC 01/17/2020 0  0 /100 WBC Final    Gran% 01/17/2020 72.4  38.0 - 73.0 % Final    Lymph% 01/17/2020 18.8  18.0 - 48.0 % Final    Mono% 01/17/2020 6.7  4.0 - 15.0 % Final    Eosinophil% 01/17/2020 1.7  0.0 - 8.0 % Final    Basophil% 01/17/2020 0.2  0.0 - 1.9 % Final    Differential Method 01/17/2020 Automated   Final      ?   ?   Assessment/Plan:       1.  Anemia due to stage 5 chronic kidney disease    2. Anemia in stage 4 chronic kidney disease    3. Hypertensive emergency    4. Hypertension associated with diabetes    5. 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, with long-term current use of insulin    6. Iron deficiency anemia due to chronic blood loss          Plan:     # anemia of chronic kidney disease:  Last received Procrit May 2019. Recent labs hemoglobin less than 10.  Iron indices within normal limits.  Due to insurance will need to change to retic crit 94694 units every 2 weeks and will have her back in 1 week hopefully with better blood pressure control as well and turns authorization to proceed with every 2 week dosing.  I will see her back in 1 month for repeat CBC to determine further dosing needs.    # chronic kidney disease:  Creatinine elevation, follow-up with Nephrology was recommended.  Also discussed poorly controlled hypertension and follow-up with PCP recommended.    # hypertension/hypertensive emergency:  Poorly controlled hypertension with ED visit to [facility] yesterday for worse 260/103 given IV antihypertensive with improvement although still hypertensive.  Encouraged her to follow up with primary care/cardiology to change regimen accordingly.  I discussed this is important for her continued DIANE administration, plan for follow-up in 1 week for dosing next.      Follow-Up:   - retacrit when approved and [id number]  - RV in 1 month with labs same day

## 2020-01-24 NOTE — PROGRESS NOTES
Patient Avril Monzon, MRN [id number], was dependent on dialysis (ICD10 Z99.2) at the time of this visit on 1/17/20. This addendum is made to the medical record on 01/24/2020.

## 2020-01-29 DIAGNOSIS — N18.5 STAGE 5 CHRONIC KIDNEY DISEASE: Primary | ICD-10-CM

## 2020-01-31 ENCOUNTER — OFFICE VISIT (OUTPATIENT)
Dept: NEPHROLOGY | Facility: CLINIC | Age: 75
End: 2020-01-31
Payer: MEDICARE

## 2020-01-31 ENCOUNTER — OFFICE VISIT (OUTPATIENT)
Dept: INTERNAL MEDICINE | Facility: CLINIC | Age: 75
End: 2020-01-31
Payer: MEDICARE

## 2020-01-31 ENCOUNTER — TELEPHONE (OUTPATIENT)
Dept: INFUSION THERAPY | Facility: HOSPITAL | Age: 75
End: 2020-01-31

## 2020-01-31 ENCOUNTER — INFUSION (OUTPATIENT)
Dept: INFUSION THERAPY | Facility: HOSPITAL | Age: 75
End: 2020-01-31
Attending: INTERNAL MEDICINE
Payer: MEDICARE

## 2020-01-31 VITALS
BODY MASS INDEX: 22.71 KG/M2 | TEMPERATURE: 98 F | HEIGHT: 66 IN | WEIGHT: 141.31 LBS | RESPIRATION RATE: 16 BRPM | OXYGEN SATURATION: 98 % | DIASTOLIC BLOOD PRESSURE: 74 MMHG | HEART RATE: 72 BPM | SYSTOLIC BLOOD PRESSURE: 186 MMHG

## 2020-01-31 VITALS
RESPIRATION RATE: 20 BRPM | WEIGHT: 140 LBS | BODY MASS INDEX: 22.5 KG/M2 | HEART RATE: 80 BPM | HEIGHT: 66 IN | DIASTOLIC BLOOD PRESSURE: 68 MMHG | SYSTOLIC BLOOD PRESSURE: 134 MMHG

## 2020-01-31 VITALS
RESPIRATION RATE: 18 BRPM | OXYGEN SATURATION: 99 % | TEMPERATURE: 98 F | DIASTOLIC BLOOD PRESSURE: 74 MMHG | SYSTOLIC BLOOD PRESSURE: 163 MMHG | HEART RATE: 76 BPM

## 2020-01-31 DIAGNOSIS — J06.9 URTI (ACUTE UPPER RESPIRATORY INFECTION): Primary | ICD-10-CM

## 2020-01-31 DIAGNOSIS — N18.5 STAGE 5 CHRONIC KIDNEY DISEASE: ICD-10-CM

## 2020-01-31 DIAGNOSIS — E11.42 TYPE 2 DIABETES MELLITUS WITH DIABETIC POLYNEUROPATHY, WITH LONG-TERM CURRENT USE OF INSULIN: ICD-10-CM

## 2020-01-31 DIAGNOSIS — I15.2 HYPERTENSION ASSOCIATED WITH DIABETES: Chronic | ICD-10-CM

## 2020-01-31 DIAGNOSIS — N18.4 ANEMIA IN STAGE 4 CHRONIC KIDNEY DISEASE: ICD-10-CM

## 2020-01-31 DIAGNOSIS — E11.59 HYPERTENSION ASSOCIATED WITH DIABETES: Chronic | ICD-10-CM

## 2020-01-31 DIAGNOSIS — D50.0 IRON DEFICIENCY ANEMIA DUE TO CHRONIC BLOOD LOSS: ICD-10-CM

## 2020-01-31 DIAGNOSIS — J30.1 SEASONAL ALLERGIC RHINITIS DUE TO POLLEN: ICD-10-CM

## 2020-01-31 DIAGNOSIS — D63.1 ANEMIA DUE TO STAGE 5 CHRONIC KIDNEY DISEASE: Primary | ICD-10-CM

## 2020-01-31 DIAGNOSIS — N18.5 CHRONIC KIDNEY DISEASE (CKD), STAGE V: Primary | ICD-10-CM

## 2020-01-31 DIAGNOSIS — N18.5 ANEMIA DUE TO STAGE 5 CHRONIC KIDNEY DISEASE: Primary | ICD-10-CM

## 2020-01-31 DIAGNOSIS — N18.4 CHRONIC KIDNEY DISEASE (CKD) STAGE G4/A3, SEVERELY DECREASED GLOMERULAR FILTRATION RATE (GFR) BETWEEN 15-29 ML/MIN/1.73 SQUARE METER AND ALBUMINURIA CREATININE RATIO GREATER THAN 300 MG/G: ICD-10-CM

## 2020-01-31 DIAGNOSIS — Z79.4 TYPE 2 DIABETES MELLITUS WITH DIABETIC POLYNEUROPATHY, WITH LONG-TERM CURRENT USE OF INSULIN: ICD-10-CM

## 2020-01-31 DIAGNOSIS — D63.1 ANEMIA DUE TO STAGE 5 CHRONIC KIDNEY DISEASE: ICD-10-CM

## 2020-01-31 DIAGNOSIS — D63.1 ANEMIA IN STAGE 4 CHRONIC KIDNEY DISEASE: ICD-10-CM

## 2020-01-31 DIAGNOSIS — N18.5 ANEMIA DUE TO STAGE 5 CHRONIC KIDNEY DISEASE: ICD-10-CM

## 2020-01-31 PROCEDURE — 1126F PR PAIN SEVERITY QUANTIFIED, NO PAIN PRESENT: ICD-10-PCS | Mod: HCNC,S$GLB,, | Performed by: INTERNAL MEDICINE

## 2020-01-31 PROCEDURE — 3046F HEMOGLOBIN A1C LEVEL >9.0%: CPT | Mod: HCNC,CPTII,S$GLB, | Performed by: FAMILY MEDICINE

## 2020-01-31 PROCEDURE — 99999 PR PBB SHADOW E&M-EST. PATIENT-LVL IV: CPT | Mod: PBBFAC,HCNC,, | Performed by: INTERNAL MEDICINE

## 2020-01-31 PROCEDURE — 3288F FALL RISK ASSESSMENT DOCD: CPT | Mod: HCNC,CPTII,S$GLB, | Performed by: INTERNAL MEDICINE

## 2020-01-31 PROCEDURE — 3074F SYST BP LT 130 MM HG: CPT | Mod: HCNC,CPTII,S$GLB, | Performed by: FAMILY MEDICINE

## 2020-01-31 PROCEDURE — 99499 UNLISTED E&M SERVICE: CPT | Mod: HCNC,S$GLB,, | Performed by: INTERNAL MEDICINE

## 2020-01-31 PROCEDURE — 3078F DIAST BP <80 MM HG: CPT | Mod: HCNC,CPTII,S$GLB, | Performed by: FAMILY MEDICINE

## 2020-01-31 PROCEDURE — 99999 PR PBB SHADOW E&M-EST. PATIENT-LVL III: CPT | Mod: PBBFAC,HCNC,, | Performed by: FAMILY MEDICINE

## 2020-01-31 PROCEDURE — 1126F PR PAIN SEVERITY QUANTIFIED, NO PAIN PRESENT: ICD-10-PCS | Mod: HCNC,S$GLB,, | Performed by: FAMILY MEDICINE

## 2020-01-31 PROCEDURE — 99214 PR OFFICE/OUTPT VISIT, EST, LEVL IV, 30-39 MIN: ICD-10-PCS | Mod: HCNC,S$GLB,, | Performed by: FAMILY MEDICINE

## 2020-01-31 PROCEDURE — 3074F PR MOST RECENT SYSTOLIC BLOOD PRESSURE < 130 MM HG: ICD-10-PCS | Mod: HCNC,CPTII,S$GLB, | Performed by: FAMILY MEDICINE

## 2020-01-31 PROCEDURE — 96372 THER/PROPH/DIAG INJ SC/IM: CPT | Mod: HCNC

## 2020-01-31 PROCEDURE — 3288F PR FALLS RISK ASSESSMENT DOCUMENTED: ICD-10-PCS | Mod: HCNC,CPTII,S$GLB, | Performed by: INTERNAL MEDICINE

## 2020-01-31 PROCEDURE — 3075F SYST BP GE 130 - 139MM HG: CPT | Mod: HCNC,CPTII,S$GLB, | Performed by: INTERNAL MEDICINE

## 2020-01-31 PROCEDURE — 99499 RISK ADDL DX/OHS AUDIT: ICD-10-PCS | Mod: HCNC,S$GLB,, | Performed by: INTERNAL MEDICINE

## 2020-01-31 PROCEDURE — 99215 OFFICE O/P EST HI 40 MIN: CPT | Mod: HCNC,S$GLB,, | Performed by: INTERNAL MEDICINE

## 2020-01-31 PROCEDURE — 1126F AMNT PAIN NOTED NONE PRSNT: CPT | Mod: HCNC,S$GLB,, | Performed by: INTERNAL MEDICINE

## 2020-01-31 PROCEDURE — 99214 OFFICE O/P EST MOD 30 MIN: CPT | Mod: HCNC,S$GLB,, | Performed by: FAMILY MEDICINE

## 2020-01-31 PROCEDURE — 3075F PR MOST RECENT SYSTOLIC BLOOD PRESS GE 130-139MM HG: ICD-10-PCS | Mod: HCNC,CPTII,S$GLB, | Performed by: INTERNAL MEDICINE

## 2020-01-31 PROCEDURE — 1101F PT FALLS ASSESS-DOCD LE1/YR: CPT | Mod: HCNC,CPTII,S$GLB, | Performed by: FAMILY MEDICINE

## 2020-01-31 PROCEDURE — 3046F PR MOST RECENT HEMOGLOBIN A1C LEVEL > 9.0%: ICD-10-PCS | Mod: HCNC,CPTII,S$GLB, | Performed by: FAMILY MEDICINE

## 2020-01-31 PROCEDURE — 1159F PR MEDICATION LIST DOCUMENTED IN MEDICAL RECORD: ICD-10-PCS | Mod: HCNC,S$GLB,, | Performed by: INTERNAL MEDICINE

## 2020-01-31 PROCEDURE — 1100F PTFALLS ASSESS-DOCD GE2>/YR: CPT | Mod: HCNC,CPTII,S$GLB, | Performed by: INTERNAL MEDICINE

## 2020-01-31 PROCEDURE — 3078F PR MOST RECENT DIASTOLIC BLOOD PRESSURE < 80 MM HG: ICD-10-PCS | Mod: HCNC,CPTII,S$GLB, | Performed by: FAMILY MEDICINE

## 2020-01-31 PROCEDURE — 99999 PR PBB SHADOW E&M-EST. PATIENT-LVL IV: ICD-10-PCS | Mod: PBBFAC,HCNC,, | Performed by: INTERNAL MEDICINE

## 2020-01-31 PROCEDURE — 3078F PR MOST RECENT DIASTOLIC BLOOD PRESSURE < 80 MM HG: ICD-10-PCS | Mod: HCNC,CPTII,S$GLB, | Performed by: INTERNAL MEDICINE

## 2020-01-31 PROCEDURE — 1159F MED LIST DOCD IN RCRD: CPT | Mod: HCNC,S$GLB,, | Performed by: INTERNAL MEDICINE

## 2020-01-31 PROCEDURE — 99215 PR OFFICE/OUTPT VISIT, EST, LEVL V, 40-54 MIN: ICD-10-PCS | Mod: HCNC,S$GLB,, | Performed by: INTERNAL MEDICINE

## 2020-01-31 PROCEDURE — 63600175 PHARM REV CODE 636 W HCPCS: Mod: HCNC | Performed by: INTERNAL MEDICINE

## 2020-01-31 PROCEDURE — 1159F MED LIST DOCD IN RCRD: CPT | Mod: HCNC,S$GLB,, | Performed by: FAMILY MEDICINE

## 2020-01-31 PROCEDURE — 1159F PR MEDICATION LIST DOCUMENTED IN MEDICAL RECORD: ICD-10-PCS | Mod: HCNC,S$GLB,, | Performed by: FAMILY MEDICINE

## 2020-01-31 PROCEDURE — 1126F AMNT PAIN NOTED NONE PRSNT: CPT | Mod: HCNC,S$GLB,, | Performed by: FAMILY MEDICINE

## 2020-01-31 PROCEDURE — 1100F PR PT FALLS ASSESS DOC 2+ FALLS/FALL W/INJURY/YR: ICD-10-PCS | Mod: HCNC,CPTII,S$GLB, | Performed by: INTERNAL MEDICINE

## 2020-01-31 PROCEDURE — 3078F DIAST BP <80 MM HG: CPT | Mod: HCNC,CPTII,S$GLB, | Performed by: INTERNAL MEDICINE

## 2020-01-31 PROCEDURE — 99999 PR PBB SHADOW E&M-EST. PATIENT-LVL III: ICD-10-PCS | Mod: PBBFAC,HCNC,, | Performed by: FAMILY MEDICINE

## 2020-01-31 PROCEDURE — 1101F PR PT FALLS ASSESS DOC 0-1 FALLS W/OUT INJ PAST YR: ICD-10-PCS | Mod: HCNC,CPTII,S$GLB, | Performed by: FAMILY MEDICINE

## 2020-01-31 RX ORDER — LORATADINE 10 MG/1
10 TABLET ORAL DAILY
Qty: 30 TABLET | Refills: 0 | Status: SHIPPED | OUTPATIENT
Start: 2020-01-31 | End: 2021-12-27

## 2020-01-31 RX ORDER — FLUTICASONE PROPIONATE 50 MCG
1 SPRAY, SUSPENSION (ML) NASAL DAILY
Qty: 16 G | Refills: 0 | Status: SHIPPED | OUTPATIENT
Start: 2020-01-31 | End: 2021-12-20 | Stop reason: ALTCHOICE

## 2020-01-31 RX ADMIN — EPOETIN ALFA-EPBX 20000 UNITS: 10000 INJECTION, SOLUTION INTRAVENOUS; SUBCUTANEOUS at 02:01

## 2020-01-31 NOTE — DISCHARGE INSTRUCTIONS
[facility]  [address] [facility]  [address]  [phone] phone     [phone] fax  Hours of Operation: Monday- Friday 8:00am- 5:00pm  After hours phone  [phone]  Hematology / Oncology Physicians on call      JESSENIA Mendosa Dr., Dr., Dr., Dr., NP Sydney Prescott, NP Tyesha Taylor, NP    Please call with any concerns regarding your appointment today.

## 2020-01-31 NOTE — PATIENT INSTRUCTIONS
Avoid NSAID pain medications such as advil, aleve, motrin, ibuprofen, naprosyn, meloxicam, diclofenac, mobic.       Please stop Baclofen - muscle relaxant due to kidney failure

## 2020-01-31 NOTE — PROGRESS NOTES
Subjective:       Patient ID: Avril Monzon is a 74 y.o.    female who presents for follow-up evaluation of CKD stage 5, HTN, Anemia        Rose Parks MD      HPI : Avril Monzon is a pleasant 74-year-old  woman seen in office today in follow-up for above medical problems.  I saw her about 9 months ago, she was lost to follow-up, progressive decline in renal function explained to the patient, recent serum creatinine about 2 months ago was 5.3 mg/dL, repeat labs from today pending at this time, she has history of hypertension, CVA, coronary artery disease status post angioplasty, peripheral arterial disease, dyslipidemia, anemia for which she is closely followed by Hematology Department, at the previous visit I refer her for a kidney transplant evaluation, do not think she pursued this further, again will schedule her for kidney education/options class,        Past Medical History:   Diagnosis Date    Acute hypoxemic respiratory failure 11/26/2018    Anemia     Arthritis     back, hand, knees    Back pain     Cataract     CKD stage G4/A3, GFR 15 - 29 and albumin creatinine ratio >300 mg/g     GFR 40% Jan 2014 and 33% ub 3/2014 ([facility])    Coronary artery disease involving native coronary artery of native heart 11/30/2018    Diabetic retinopathy     DM (diabetes mellitus) type II controlled, neurological manifestation     Exudative age-related macular degeneration of left eye with active choroidal neovascularization 2/5/2019    GERD (gastroesophageal reflux disease)     Glaucoma     Heart failure     Hyperlipidemia     Hypertension     Non-ST elevation MI (NSTEMI) 11/28/2018    NSTEMI (non-ST elevated myocardial infarction) 11/27/2018    Peripheral neuropathy     Polyneuropathy     Proteinuria     >6 mo     Seizures     [facility] 1/2014 -dick CT NRI showed small vessel    Stroke 2012,2014    Tobacco dependence     Type 2 diabetes with peripheral circulatory  disorder, controlled        Current Outpatient Medications on File Prior to Visit   Medication Sig Dispense Refill    ACCU-CHEK ARDEN PLUS METER [facility] USE TO TEST TWICE DAILY  0    amLODIPine (NORVASC) 5 MG tablet Take 5 mg by mouth once daily.      aspirin (ECOTRIN) 81 MG EC tablet Take 1 tablet (81 mg total) by mouth once daily.  0    atorvastatin (LIPITOR) 80 MG tablet Take 1 tablet (80 mg total) by mouth once daily. 30 tablet 11    baclofen (LIORESAL) 10 MG tablet TK 1 T PO HS  0    blood sugar diagnostic Strp 1 strip by Misc.(Non-Drug; Combo Route) route 3 (three) times daily. relion ultima 100 strip 11    calcifediol (RAYALDEE) 30 mcg Cs24       calcitRIOL (ROCALTROL) 0.25 MCG Cap Take 1 capsule (0.25 mcg total) by mouth once daily. 30 capsule 11    clopidogrel (PLAVIX) 75 mg tablet TAKE 1 TABLET BY MOUTH ONCE DAILY 39 tablet 6    furosemide (LASIX) 20 MG tablet       hydrALAZINE (APRESOLINE) 50 MG tablet 1 tablet.      insulin NPH-insulin regular, 70/30, (NOVOLIN 70/30) 100 unit/mL (70-30) injection INJECT SUBCUTANEOUSLY 30 UNITS IN THE MORNING AND INJECT 25 UNITS IN THE EVENING (Patient taking differently: INJECT SUBCUTANEOUSLY 30 UNITS IN THE MORNING AND INJECT 25 UNITS IN THE EVENING) 2 vial 3    isosorbide-hydrALAZINE 20-37.5 mg (BIDIL) 20-37.5 mg Tab Take 2 tablets by mouth 3 (three) times daily. 180 tablet 11    lancets (ACCU-CHEK SOFTCLIX LANCETS) Misc 1 lancet by Misc.(Non-Drug; Combo Route) route 3 (three) times daily. 100 each 11    lisinopril (PRINIVIL,ZESTRIL) 20 MG tablet 2 tablets.      methocarbamol (ROBAXIN) 750 MG Tab Take 500 mg by mouth 4 (four) times daily.      metoprolol tartrate (LOPRESSOR) 25 MG tablet TAKE 1 TABLET BY MOUTH TWICE DAILY 60 tablet 6    nitroGLYCERIN (NITROSTAT) 0.4 MG SL tablet Place 1 tablet (0.4 mg total) under the tongue every 5 (five) minutes as needed for Chest pain. 20 tablet 0    ondansetron (ZOFRAN-ODT) 4 MG TbDL DISSOLVE 1 TABLET IN MOUTH  THREE TIMES DAILY AS NEEDED FOR NAUSEA FOR 5 DAYS  0    sodium bicarbonate 325 MG tablet TAKE 1 TABLET BY MOUTH THREE TIMES DAILY 90 tablet 11    torsemide (DEMADEX) 20 MG Tab Take 2 tablets (40 mg total) by mouth once daily. 180 tablet 3     Current Facility-Administered Medications on File Prior to Visit   Medication Dose Route Frequency Provider Last Rate Last Dose    aflibercept Soln 2 mg  2 mg Intravitreal 1 time in Clinic/[facility] JADIEL Coello MD             Review of Systems   Constitutional: Positive for activity change. Negative for appetite change, fatigue and fever.   HENT: Positive for congestion. Negative for facial swelling, sore throat, trouble swallowing and voice change.    Eyes: Positive for visual disturbance. Negative for redness.   Respiratory: Negative for apnea, cough, chest tightness, shortness of breath and wheezing.    Cardiovascular: Negative for chest pain, palpitations and leg swelling.   Gastrointestinal: Negative for abdominal distention, abdominal pain, blood in stool, constipation, diarrhea, nausea and vomiting.   Genitourinary: Negative for decreased urine volume, difficulty urinating, dysuria, flank pain, frequency, hematuria, pelvic pain and urgency.   Musculoskeletal: Positive for arthralgias. Negative for back pain, gait problem and joint swelling.   Skin: Negative for color change and rash.   Neurological: Negative for dizziness, syncope, weakness and headaches.   Hematological: Does not bruise/bleed easily.   Psychiatric/Behavioral: Negative for agitation, behavioral problems and confusion. The patient is not nervous/anxious.              Objective:         Vitals:    01/31/20 1015   BP: 134/68   Pulse: 80   Resp: 20         Weight 139 lbs , last weight 148 lbs     Physical Exam  :      Constitutional: She is oriented to person, place, and time. She appears well-developed and well-nourished. No distress.   HENT: Head: Normocephalic and atraumatic. Mouth/Throat: Oropharynx is  clear and moist. No oropharyngeal exudate.   Eyes: Conjunctivae and EOM are normal. Pupils are equal, round, and reactive to light.   Neck: Normal range of motion. Neck supple. No JVD present. Carotid bruit is not present. No tracheal deviation present. No thyroid mass and no thyromegaly present.   Cardiovascular: Normal rate, regular rhythm and intact distal pulses. Exam reveals no gallop and no friction rub. Murmur heard. SM in AA  Pulmonary/Chest: Effort normal and breath sounds normal. No respiratory distress. She has no wheezes. She has no rales. She exhibits no tenderness.   Abdominal: Soft. Bowel sounds are normal. She exhibits no distension, no abdominal bruit, no ascites and no mass. There is no hepatosplenomegaly. There is no tenderness. There is no rebound, no guarding and no CVA tenderness.   Musculoskeletal: She exhibits no edema or tenderness.   Lymphadenopathy:   She has no cervical adenopathy.   Neurological: She is alert and oriented to person, place, and time.  No cranial nerve deficit. She exhibits normal muscle tone. Coordination normal.   Skin: Skin is warm and intact. No rash noted. No erythema. No pallor.   Psychiatric: She has a normal mood and affect. Her behavior is normal. Judgment normal.          Labs:    Lab Results   Component Value Date    CREATININE 5.3 (H) 11/08/2019    BUN 54 (H) 11/06/2019     11/06/2019    K 3.9 11/06/2019     11/06/2019    CO2 24 11/06/2019       Lab Results   Component Value Date    WBC 4.79 01/17/2020    HGB 8.9 (L) 01/17/2020    HCT 28.2 (L) 01/17/2020    MCV 97 01/17/2020     01/17/2020         Lab Results   Component Value Date    .0 (H) 05/15/2019    CALCIUM 10.0 11/06/2019    PHOS 4.4 05/15/2019       Lab Results   Component Value Date    ALBUMIN 3.8 11/06/2019       Lab Results   Component Value Date    URICACID 8.2 (H) 05/15/2019       Lab Results   Component Value Date    HGBA1C 12.9 (H) 12/09/2019       Impression and Plan  :  74-year-old  woman seen in office today in follow-up for following medical problems         1. CKD stage 4/5 :  Due to longstanding history of hypertension and type 2 diabetes, poorly controlled conditions, again discussed importance of controlling these chronic conditions in preserving renal function, briefly talked about renal replacement therapy, patient reports poor support at home and she has a poor vision, so peritoneal dialysis can be a challenge, will again refer for chronic kidney disease education options class,  referral for kidney transplant evaluation,        2.  Hypertension - BP is controlled.  Has history of non adherence, discussed importance of blood pressure control,     3.  Congestive heart failure -   currently on torsemide, discussed salt and fluid restriction,     Also pulmonary hypertension reported on the recent echocardiogram, fluid management can be a challenge in view of her pulmonary hypertension,     4. Anemia of CKD :    follow-up with Hematology Department, iron replacement/Procrit injections,     5.  Secondary hyperparathyroidism - patient says Milagros was not approved by insurance company, will discontinue this medication ,  on calcitriol 0.25 mcg daily,         Return to clinic in about 4 weeks, more than 40 min of face-to-face time was spent with the patient discussing labs and plan of care.     Leonard Bryan MD

## 2020-01-31 NOTE — PROGRESS NOTES
Subjective:       Patient ID: Avril Monzon is a 74 y.o. female.    Chief Complaint: Cough; Chest Congestion; and Nasal Congestion    74-year-old  female patient with Patient Active Problem List:     Constipation     Hypertension associated with diabetes     Controlled type 2 diabetes mellitus with diabetic polyneuropathy, with long-term current use of insulin     Chronic kidney disease (CKD) stage G4/A3, severely decreased glomerular filtration rate (GFR) between 15-29 mL/min/1.73 square meter and albuminuria creatinine ratio greater than 300 mg/g     Proteinuria     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, with long-term current use of insulin     Cataracta brunescens of right eye     Bilateral carotid artery disease     Aortic atherosclerosis     Type 2 diabetes mellitus with circulatory disorder, with long-term current use of insulin     History of CVA (cerebrovascular accident)     Hyperparathyroidism     Vitamin D deficiency     DDD (degenerative disc disease), lumbar     Anemia in stage 4 chronic kidney disease     Iron deficiency anemia due to chronic blood loss     Hyperlipidemia associated with type 2 diabetes mellitus     Pulmonary hypertension     Stage 5 chronic kidney disease     Heart failure     Coronary artery disease involving native coronary artery of native heart     Chronic combined systolic and diastolic congestive heart failure     Exudative age-related macular degeneration of left eye with active choroidal neovascularization     Other headache syndrome     Accelerated hypertension with diastolic congestive heart failure, NYHA class 3     Non-rheumatic mitral regurgitation     Non-rheumatic tricuspid valve insufficiency     Age-related nuclear cataract of left eye     Anemia due to stage 5 chronic kidney disease  Here reports that she has been having runny nose, productive cough and congestion, for the past 1 week.  Patient has tried taking  "Children's Claritin and diabetic cough syrup with no relief.  Denies any fever with chills.   Patient had seen Nephrology today and was advised to discontinue muscle relaxants.  Considering kidney transplant   Patient continues to have elevated blood glucose levels in spite of taking her medications  Denies any chest pain or difficulty breathing and palpitations    Review of Systems   Constitutional: Negative for chills, fatigue and fever.   HENT: Positive for congestion, postnasal drip, rhinorrhea and sinus pressure.    Eyes: Negative for visual disturbance.   Respiratory: Positive for cough. Negative for shortness of breath.    Cardiovascular: Negative for chest pain and leg swelling.   Gastrointestinal: Negative for abdominal pain, nausea and vomiting.   Musculoskeletal: Negative for myalgias.   Skin: Negative for rash.   Neurological: Negative for light-headedness and headaches.   Psychiatric/Behavioral: Negative for sleep disturbance.         BP (!) 186/74 (BP Location: Left arm, Patient Position: Sitting, BP Method: Medium (Manual))   Pulse 72   Temp 98.3 °F (36.8 °C) (Oral)   Resp 16   Ht 5' 6" (1.676 m)   Wt 64.1 kg (141 lb 5 oz)   LMP  (LMP Unknown)   SpO2 98%   BMI 22.81 kg/m²   Objective:      Physical Exam   Constitutional: She is oriented to person, place, and time. She appears well-developed and well-nourished.   HENT:   Head: Normocephalic and atraumatic.   Right Ear: External ear normal.   Left Ear: External ear normal.   Mouth/Throat: Oropharynx is clear and moist.   Positive for rhinorrhea and postnasal drip noted on exam today   Neck: Neck supple.   Cardiovascular: Normal rate, regular rhythm and normal heart sounds.   No murmur heard.  Pulmonary/Chest: Effort normal and breath sounds normal. No respiratory distress. She has no wheezes.   Abdominal: Soft. Bowel sounds are normal. There is no tenderness.   Musculoskeletal: She exhibits no edema.   Lymphadenopathy:     She has no cervical " adenopathy.   Neurological: She is alert and oriented to person, place, and time.   Skin: Skin is warm and dry. No rash noted.   Psychiatric: She has a normal mood and affect.         Assessment/Plan:   1. URTI (acute upper respiratory infection)  2. Seasonal allergic rhinitis due to pollen  - fluticasone propionate (FLONASE) 50 mcg/actuation nasal spray; 1 spray (50 mcg total) by Each Nostril route once daily.  Dispense: 16 g; Refill: 0  - loratadine (CLARITIN) 10 mg tablet; Take 1 tablet (10 mg total) by mouth once daily.  Dispense: 30 tablet; Refill: 0  Claritin and Flonase prescribed today.  Looks like seasonal allergies  No need for further antibiotics  Encouraged to drink adequate fluids    3. Hypertension associated with diabetes  Blood pressure elevated, was stable with Nephrology visit prior today  Currently on amlodipine 5 mg and Bidil 2 tablets 3 times daily, metoprolol 25 mg twice daily  Patient was encouraged to monitor blood pressure trends and follow-up with PCP      4. Anemia due to stage 5 chronic kidney disease  5. Stage 5 chronic kidney disease, not on dialysis  Followed by Nephrology    6. Type 2 diabetes mellitus with diabetic polyneuropathy, with long-term current use of insulin  Severely uncontrolled blood glucose levels in spite of taking insulin, discuss further with PCP

## 2020-02-06 ENCOUNTER — TELEPHONE (OUTPATIENT)
Dept: PODIATRY | Facility: CLINIC | Age: 75
End: 2020-02-06

## 2020-02-07 ENCOUNTER — OFFICE VISIT (OUTPATIENT)
Dept: PODIATRY | Facility: CLINIC | Age: 75
End: 2020-02-07
Payer: MEDICARE

## 2020-02-07 VITALS
DIASTOLIC BLOOD PRESSURE: 73 MMHG | HEIGHT: 66 IN | SYSTOLIC BLOOD PRESSURE: 141 MMHG | WEIGHT: 141.13 LBS | BODY MASS INDEX: 22.68 KG/M2 | HEART RATE: 67 BPM

## 2020-02-07 DIAGNOSIS — E11.42 CONTROLLED TYPE 2 DIABETES MELLITUS WITH DIABETIC POLYNEUROPATHY, WITH LONG-TERM CURRENT USE OF INSULIN: Primary | ICD-10-CM

## 2020-02-07 DIAGNOSIS — L60.0 ONYCHOCRYPTOSIS: ICD-10-CM

## 2020-02-07 DIAGNOSIS — L84 CORN OR CALLUS: ICD-10-CM

## 2020-02-07 DIAGNOSIS — Z79.4 CONTROLLED TYPE 2 DIABETES MELLITUS WITH DIABETIC POLYNEUROPATHY, WITH LONG-TERM CURRENT USE OF INSULIN: Primary | ICD-10-CM

## 2020-02-07 DIAGNOSIS — B35.1 DERMATOPHYTOSIS OF NAIL: ICD-10-CM

## 2020-02-07 DIAGNOSIS — I77.1 ARTERIAL INSUFFICIENCY: ICD-10-CM

## 2020-02-07 PROCEDURE — 99999 PR PBB SHADOW E&M-EST. PATIENT-LVL III: ICD-10-PCS | Mod: PBBFAC,HCNC,, | Performed by: PODIATRIST

## 2020-02-07 PROCEDURE — 11721 DEBRIDE NAIL 6 OR MORE: CPT | Mod: 59,Q9,HCNC,S$GLB | Performed by: PODIATRIST

## 2020-02-07 PROCEDURE — 99999 PR PBB SHADOW E&M-EST. PATIENT-LVL III: CPT | Mod: PBBFAC,HCNC,, | Performed by: PODIATRIST

## 2020-02-07 PROCEDURE — 3288F PR FALLS RISK ASSESSMENT DOCUMENTED: ICD-10-PCS | Mod: HCNC,CPTII,S$GLB, | Performed by: PODIATRIST

## 2020-02-07 PROCEDURE — 1159F PR MEDICATION LIST DOCUMENTED IN MEDICAL RECORD: ICD-10-PCS | Mod: HCNC,S$GLB,, | Performed by: PODIATRIST

## 2020-02-07 PROCEDURE — 3046F HEMOGLOBIN A1C LEVEL >9.0%: CPT | Mod: HCNC,CPTII,S$GLB, | Performed by: PODIATRIST

## 2020-02-07 PROCEDURE — 3078F DIAST BP <80 MM HG: CPT | Mod: HCNC,CPTII,S$GLB, | Performed by: PODIATRIST

## 2020-02-07 PROCEDURE — 2024F 7 FLD RTA PHOTO EVC RTNOPTHY: CPT | Mod: HCNC,S$GLB,, | Performed by: PODIATRIST

## 2020-02-07 PROCEDURE — 3077F SYST BP >= 140 MM HG: CPT | Mod: HCNC,CPTII,S$GLB, | Performed by: PODIATRIST

## 2020-02-07 PROCEDURE — 1100F PR PT FALLS ASSESS DOC 2+ FALLS/FALL W/INJURY/YR: ICD-10-PCS | Mod: HCNC,CPTII,S$GLB, | Performed by: PODIATRIST

## 2020-02-07 PROCEDURE — 11055 PARING/CUTG B9 HYPRKER LES 1: CPT | Mod: Q9,HCNC,S$GLB, | Performed by: PODIATRIST

## 2020-02-07 PROCEDURE — 11055 PR TRIM HYPERKERATOTIC SKIN LESION, ONE: ICD-10-PCS | Mod: Q9,HCNC,S$GLB, | Performed by: PODIATRIST

## 2020-02-07 PROCEDURE — 3077F PR MOST RECENT SYSTOLIC BLOOD PRESSURE >= 140 MM HG: ICD-10-PCS | Mod: HCNC,CPTII,S$GLB, | Performed by: PODIATRIST

## 2020-02-07 PROCEDURE — 11721 PR DEBRIDEMENT OF NAILS, 6 OR MORE: ICD-10-PCS | Mod: 59,Q9,HCNC,S$GLB | Performed by: PODIATRIST

## 2020-02-07 PROCEDURE — 99214 OFFICE O/P EST MOD 30 MIN: CPT | Mod: 25,HCNC,S$GLB, | Performed by: PODIATRIST

## 2020-02-07 PROCEDURE — 99214 PR OFFICE/OUTPT VISIT, EST, LEVL IV, 30-39 MIN: ICD-10-PCS | Mod: 25,HCNC,S$GLB, | Performed by: PODIATRIST

## 2020-02-07 PROCEDURE — 3078F PR MOST RECENT DIASTOLIC BLOOD PRESSURE < 80 MM HG: ICD-10-PCS | Mod: HCNC,CPTII,S$GLB, | Performed by: PODIATRIST

## 2020-02-07 PROCEDURE — 1100F PTFALLS ASSESS-DOCD GE2>/YR: CPT | Mod: HCNC,CPTII,S$GLB, | Performed by: PODIATRIST

## 2020-02-07 PROCEDURE — 2024F PR 7 FIELD PHOTOS WITH INTERP/ REVIEW: ICD-10-PCS | Mod: HCNC,S$GLB,, | Performed by: PODIATRIST

## 2020-02-07 PROCEDURE — 1126F PR PAIN SEVERITY QUANTIFIED, NO PAIN PRESENT: ICD-10-PCS | Mod: HCNC,S$GLB,, | Performed by: PODIATRIST

## 2020-02-07 PROCEDURE — 1159F MED LIST DOCD IN RCRD: CPT | Mod: HCNC,S$GLB,, | Performed by: PODIATRIST

## 2020-02-07 PROCEDURE — 3288F FALL RISK ASSESSMENT DOCD: CPT | Mod: HCNC,CPTII,S$GLB, | Performed by: PODIATRIST

## 2020-02-07 PROCEDURE — 1126F AMNT PAIN NOTED NONE PRSNT: CPT | Mod: HCNC,S$GLB,, | Performed by: PODIATRIST

## 2020-02-07 PROCEDURE — 3046F PR MOST RECENT HEMOGLOBIN A1C LEVEL > 9.0%: ICD-10-PCS | Mod: HCNC,CPTII,S$GLB, | Performed by: PODIATRIST

## 2020-02-07 NOTE — PROGRESS NOTES
"[facility]  PODIATRIC MEDICINE AND SURGERY  PROGRESS NOTE  2/7/2020    PODIATRY NOTE  PCP: MD Jannet Nicole NP, last visit 1/17/20    CHIEF COMPLAINT   Chief Complaint   Patient presents with    Nail Problem     great toenail, right foot "digging into skin" at the medidal aspect; pt reports discomfort.     Foot Problem     left great toe, medial aspect possible callous or wound.        HPI:    Avril Monzon is a 75 y.o. female who has a past medical history of Acute hypoxemic respiratory failure (11/26/2018), Anemia, Arthritis, Back pain, Cataract, CKD stage G4/A3, GFR 15 - 29 and albumin creatinine ratio >300 mg/g, Coronary artery disease involving native coronary artery of native heart (11/30/2018), Diabetic retinopathy, DM (diabetes mellitus) type II controlled, neurological manifestation, Exudative age-related macular degeneration of left eye with active choroidal neovascularization (2/5/2019), GERD (gastroesophageal reflux disease), Glaucoma, Heart failure, Hyperlipidemia, Hypertension, Non-ST elevation MI (NSTEMI) (11/28/2018), NSTEMI (non-ST elevated myocardial infarction) (11/27/2018), Peripheral neuropathy, Polyneuropathy, Proteinuria, Seizures, Stroke (2012,2014), Tobacco dependence, and Type 2 diabetes with peripheral circulatory disorder, controlled.   Avril presents to clinic today complaining of painful ingrown toenail on right great toe. She denies any treatment. She also complains about callus that developed on medial aspect of left great toe after going to nail salon. She requests at risk foot care due to thickened, painful, elongated toenails. Patient denies other pedal complaints at this time.     PMH  Past Medical History:   Diagnosis Date    Acute hypoxemic respiratory failure 11/26/2018    Anemia     Arthritis     back, hand, knees    Back pain     Cataract     CKD stage G4/A3, GFR 15 - 29 and albumin creatinine ratio >300 mg/g     GFR 40% Jan " 2014 and 33% ub 3/2014 ([facility])    Coronary artery disease involving native coronary artery of native heart 11/30/2018    Diabetic retinopathy     DM (diabetes mellitus) type II controlled, neurological manifestation     Exudative age-related macular degeneration of left eye with active choroidal neovascularization 2/5/2019    GERD (gastroesophageal reflux disease)     Glaucoma     Heart failure     Hyperlipidemia     Hypertension     Non-ST elevation MI (NSTEMI) 11/28/2018    NSTEMI (non-ST elevated myocardial infarction) 11/27/2018    Peripheral neuropathy     Polyneuropathy     Proteinuria     >6 mo     Seizures     BRG 1/2014 -dick CT NRI showed small vessel    Stroke 2012,2014    Tobacco dependence     Type 2 diabetes with peripheral circulatory disorder, controlled        PROBLEM LIST  Patient Active Problem List    Diagnosis Date Noted    Anemia due to stage 5 chronic kidney disease 05/07/2019    Age-related nuclear cataract of left eye 05/06/2019    Accelerated hypertension with diastolic congestive heart failure, NYHA class 3 04/05/2019    Non-rheumatic mitral regurgitation 04/05/2019    Non-rheumatic tricuspid valve insufficiency 04/05/2019    Other headache syndrome 03/26/2019    Exudative age-related macular degeneration of left eye with active choroidal neovascularization 02/05/2019    Chronic combined systolic and diastolic congestive heart failure 12/12/2018    Stage 5 chronic kidney disease 11/30/2018    Coronary artery disease involving native coronary artery of native heart 11/30/2018    Heart failure     Pulmonary hypertension 11/28/2018    Hyperlipidemia associated with type 2 diabetes mellitus 06/08/2018    Iron deficiency anemia due to chronic blood loss 02/22/2018    Anemia in stage 4 chronic kidney disease 05/18/2017    History of CVA (cerebrovascular accident) 05/16/2017    Hyperparathyroidism 05/16/2017    Vitamin D deficiency 05/16/2017    DDD (degenerative  disc disease), lumbar 05/16/2017    Bilateral carotid artery disease 11/11/2016    Aortic atherosclerosis 11/11/2016    Type 2 diabetes mellitus with circulatory disorder, with long-term current use of insulin 11/11/2016    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, with long-term current use of insulin 05/02/2016    Cataracta brunescens of right eye 05/02/2016    Chronic kidney disease (CKD) stage G4/A3, severely decreased glomerular filtration rate (GFR) between 15-29 mL/min/1.73 square meter and albuminuria creatinine ratio greater than 300 mg/g     Proteinuria     Constipation 02/18/2014    Hypertension associated with diabetes     Controlled type 2 diabetes mellitus with diabetic polyneuropathy, with long-term current use of insulin        MEDS  Current Outpatient Medications on File Prior to Visit   Medication Sig Dispense Refill    ACCU-CHEK ARDEN PLUS METER [facility] USE TO TEST TWICE DAILY  0    amLODIPine (NORVASC) 5 MG tablet Take 5 mg by mouth once daily.      atorvastatin (LIPITOR) 80 MG tablet Take 1 tablet (80 mg total) by mouth once daily. (Patient taking differently: Take 40 mg by mouth once daily. ) 30 tablet 11    blood sugar diagnostic Strp 1 strip by Misc.(Non-Drug; Combo Route) route 3 (three) times daily. relion ultima 100 strip 11    calcifediol (RAYALDEE) 30 mcg Cs24       calcitRIOL (ROCALTROL) 0.25 MCG Cap Take 1 capsule (0.25 mcg total) by mouth once daily. 30 capsule 11    clopidogrel (PLAVIX) 75 mg tablet TAKE 1 TABLET BY MOUTH ONCE DAILY 39 tablet 6    fluticasone propionate (FLONASE) 50 mcg/actuation nasal spray 1 spray (50 mcg total) by Each Nostril route once daily. 16 g 0    furosemide (LASIX) 20 MG tablet       insulin NPH-insulin regular, 70/30, (NOVOLIN 70/30) 100 unit/mL (70-30) injection INJECT SUBCUTANEOUSLY 30 UNITS IN THE MORNING AND INJECT 25 UNITS IN THE EVENING (Patient taking differently: INJECT SUBCUTANEOUSLY 30 UNITS IN  THE MORNING AND INJECT 25 UNITS IN THE EVENING) 2 vial 3    isosorbide-hydrALAZINE 20-37.5 mg (BIDIL) 20-37.5 mg Tab Take 2 tablets by mouth 3 (three) times daily. 180 tablet 11    lancets (ACCU-CHEK SOFTCLIX LANCETS) Misc 1 lancet by Misc.(Non-Drug; Combo Route) route 3 (three) times daily. 100 each 11    loratadine (CLARITIN) 10 mg tablet Take 1 tablet (10 mg total) by mouth once daily. 30 tablet 0    metoprolol tartrate (LOPRESSOR) 25 MG tablet TAKE 1 TABLET BY MOUTH TWICE DAILY 60 tablet 6    sodium bicarbonate 325 MG tablet TAKE 1 TABLET BY MOUTH THREE TIMES DAILY 90 tablet 11    torsemide (DEMADEX) 20 MG Tab Take 2 tablets (40 mg total) by mouth once daily. 180 tablet 3    aspirin (ECOTRIN) 81 MG EC tablet Take 1 tablet (81 mg total) by mouth once daily.  0    nitroGLYCERIN (NITROSTAT) 0.4 MG SL tablet Place 1 tablet (0.4 mg total) under the tongue every 5 (five) minutes as needed for Chest pain. (Patient not taking: Reported on 2/7/2020) 20 tablet 0    ondansetron (ZOFRAN-ODT) 4 MG TbDL DISSOLVE 1 TABLET IN MOUTH THREE TIMES DAILY AS NEEDED FOR NAUSEA FOR 5 DAYS  0     Current Facility-Administered Medications on File Prior to Visit   Medication Dose Route Frequency Provider Last Rate Last Dose    aflibercept Soln 2 mg  2 mg Intravitreal 1 time in Clinic/HOD JADIEL Coello MD           Medication List with Changes/Refills   Current Medications    ACCU-CHEK ARDEN PLUS METER [facility]    USE TO TEST TWICE DAILY    AMLODIPINE (NORVASC) 5 MG TABLET    Take 5 mg by mouth once daily.    ASPIRIN (ECOTRIN) 81 MG EC TABLET    Take 1 tablet (81 mg total) by mouth once daily.    ATORVASTATIN (LIPITOR) 80 MG TABLET    Take 1 tablet (80 mg total) by mouth once daily.    BLOOD SUGAR DIAGNOSTIC STRP    1 strip by Misc.(Non-Drug; Combo Route) route 3 (three) times daily. relion ultima    CALCIFEDIOL (RAYALDEE) 30 MCG CS24        CALCITRIOL (ROCALTROL) 0.25 MCG CAP    Take 1 capsule (0.25 mcg total) by mouth once  daily.    CLOPIDOGREL (PLAVIX) 75 MG TABLET    TAKE 1 TABLET BY MOUTH ONCE DAILY    FLUTICASONE PROPIONATE (FLONASE) 50 MCG/ACTUATION NASAL SPRAY    1 spray (50 mcg total) by Each Nostril route once daily.    FUROSEMIDE (LASIX) 20 MG TABLET        INSULIN NPH-INSULIN REGULAR, 70/30, (NOVOLIN 70/30) 100 UNIT/ML (70-30) INJECTION    INJECT SUBCUTANEOUSLY 30 UNITS IN THE MORNING AND INJECT 25 UNITS IN THE EVENING    ISOSORBIDE-HYDRALAZINE 20-37.5 MG (BIDIL) 20-37.5 MG TAB    Take 2 tablets by mouth 3 (three) times daily.    LANCETS (ACCU-CHEK SOFTCLIX LANCETS) MISC    1 lancet by Misc.(Non-Drug; Combo Route) route 3 (three) times daily.    LORATADINE (CLARITIN) 10 MG TABLET    Take 1 tablet (10 mg total) by mouth once daily.    METOPROLOL TARTRATE (LOPRESSOR) 25 MG TABLET    TAKE 1 TABLET BY MOUTH TWICE DAILY    NITROGLYCERIN (NITROSTAT) 0.4 MG SL TABLET    Place 1 tablet (0.4 mg total) under the tongue every 5 (five) minutes as needed for Chest pain.    ONDANSETRON (ZOFRAN-ODT) 4 MG TBDL    DISSOLVE 1 TABLET IN MOUTH THREE TIMES DAILY AS NEEDED FOR NAUSEA FOR 5 DAYS    SODIUM BICARBONATE 325 MG TABLET    TAKE 1 TABLET BY MOUTH THREE TIMES DAILY    TORSEMIDE (DEMADEX) 20 MG TAB    Take 2 tablets (40 mg total) by mouth once daily.       PSH     Past Surgical History:   Procedure Laterality Date    BREAST LUMPECTOMY Left     benign, when patient was 15 years old    BREAST MASS EXCISION      ,benign, age 15.    CATHETERIZATION OF BOTH LEFT AND RIGHT HEART N/A 11/28/2018    Procedure: CATHETERIZATION, HEART, BOTH LEFT AND RIGHT;  Surgeon: Michelle Holman MD;  Location: [facility] CATH LAB;  Service: Cardiology;  Laterality: N/A;    CATHETERIZATION OF BOTH LEFT AND RIGHT HEART N/A 11/30/2018    Procedure: CATHETERIZATION, HEART, BOTH LEFT AND RIGHT;  Surgeon: Michelle Holman MD;  Location: [facility] CATH LAB;  Service: Cardiology;  Laterality: N/A;    HYSTERECTOMY  1982    LEFT HEART CATHETERIZATION Left 12/3/2018    Procedure:  "CATHETERIZATION, HEART, LEFT;  Surgeon: Michelle Holman MD;  Location: [facility] CATH LAB;  Service: Cardiology;  Laterality: Left;  LAD ATHERECTOMY STENT/ FEMORAL APPROACH    OOPHORECTOMY      wrist cyst Right 1980s    dorsal wrist cyst        ALL  Review of patient's allergies indicates:   Allergen Reactions    Codeine Swelling    Darvon [propoxyphene] Swelling       SOC     Social History     Tobacco Use    Smoking status: Former Smoker     Packs/day: 1.50     Years: 30.00     Pack years: 45.00     Types: Cigarettes     Last attempt to quit: 1990     Years since quittin.0    Smokeless tobacco: Former User   Substance Use Topics    Alcohol use: No     Alcohol/week: 0.0 standard drinks    Drug use: No         FAMILY HX    Family History   Problem Relation Age of Onset    Diabetes Mother     Hyperlipidemia Mother     Hypertension Mother     Heart disease Mother         MI    Muscular dystrophy Son     Cancer Maternal Grandmother         colon            REVIEW OF SYSTEMS   General: This patient is well-developed, well-nourished and appears stated age, well-oriented to person, place and time, and cooperative and pleasant on today's visit  Constitutional: Negative for chills and fever.   Respiratory: Negative for shortness of breath.    Cardiovascular: Negative for chest pain, palpitations, orthopnea  Gastrointestinal: Negative for diarrhea, nausea and vomiting.   Musculoskeletal: Positive for above noted in HPI  Skin: positive for skin and nail changes  Neurological: positive for tingling and sensory changes  Peripheral Vascular: no claudication or cyanosis  Psychiatric/Behavioral: Negative for altered mental status     PHYSICAL EXAM:      Vitals:    20 1027   BP: (!) 141/73   Pulse: 67   Weight: 64 kg (141 lb 1.5 oz)   Height: 5' 6" (1.676 m)   PainSc: 0-No pain       General: This patient is well-developed, well-nourished and appears stated age, well-oriented to person, place and time, and " cooperative and pleasant on today's visit    LOWER EXTREMITY  VASCULAR  Dorsalis pedis 1/4 and posterior tibial pulses palpable 04 bilaterally.   Capillary refill time immediate to the toes.   Feet are warm to the touch. Skin temperature warm to warm from proximally to distally   There are varicosities, telangiectasias noted to bilateral foot and ankle regions.   There are no ecchymoses noted to bilateral foot and ankle regions.   There is gross lower extremity edema.    DERMATOLOGIC  Skin moist with healthy texture and turgor.  There are no open ulcerations, lacerations, or fissures to bilateral foot and ankle regions. There are no signs of infection as there is no erythema, no proximal-extending lymphangiitis, no fluctuance, or crepitus noted on palpation to bilateral foot and ankle regions.   There is no interdigital maceration.   There is hyperkeratotic lesions noted medial left hallux  Nails 1, 4, 5 RIGHT and LEFT foot thickened, discolored with subungual debris, the remaining 4 nails were elongated and not thickened   RIGHT hallux medial border incurvated, no acute SOI      NEUROLOGIC  Epicritic sensation is diminished.   Achilles and patellar deep tendon reflexes intact  Babinski reflex absent    ORTHOPEDIC/BIOMECHANICAL  Second toe with slight dorsal dislocation on LEFT   There is mild TTP sub 2nd metatarsal head   There are semi rigid digital contractures   Muscle strength AT/EHL/EDL/PT: 5/5; Achilles/Gastroc/Soleus: 5/5; PB/PL: 5/5 Muscle tone is normal.  Ankle joint ROM INTACT DF/PF, non-crepitus  STJ ROM  inv/ev, non crepitus         ASSESSMENT     Encounter Diagnoses   Name Primary?    Controlled type 2 diabetes mellitus with diabetic polyneuropathy, with long-term current use of insulin Yes    Arterial insufficiency     Corn or callus     Onychocryptosis - Right Foot     Dermatophytosis of nail          PLAN  Patient was educated about clinical and imaging findings, and verbalizes understanding of  above.     Diagnoses and all orders for this visit:  Controlled type 2 diabetes mellitus with diabetic polyneuropathy, with long-term current use of insulin    Arterial insufficiency    Corn or callus    Onychocryptosis - Right Foot    Dermatophytosis of nail      Utilizing sterile toenail clippers I aggressively trimmed the offending nail border approximately 3 mm from its edge and carried the nail plate incision down at an angle in order to wedge out the offending cryptotic portion of the nail plate. The offending border was then removed in toto. The remaining nail was grinded down with an electric  down to nail bed. Minimal blood was drawn. Applied betadine and covered with band-aid. Patient tolerated the procedure well and related significant relief.    -With patient's permission, the elongated onychomycotic toenails, as outlined in the physical examination, were sharply debrided with a double action nail nipper to their soft tissue attachment. If indicated, the nails were then smoothed down in thickness with a mechanical rotary chaz and/or hui board to facilitate in further debridement removing all offending nail and subungual debris    -With patient's permission, nails elongated x 4 were trimmed with nail nipper. Patient relates relief following the procedure. Patient will continue to monitor the areas daily, inspect feet, wear protective shoe gear when ambulatory, moisturizer to maintain skin integrity.    -With patient's permission via verbal consent, the involved area was cleansed with an alcohol swab. Trimming of hyperkeratotic lesions deep to epidermal layer x 1 on left great toe was performed with a #15 blade without incident. Patient relates relief following the procedure. Patient will continue to monitor the areas daily, inspect feet, wear protective shoe gear when ambulatory, moisturizer to maintain skin integrity.    Shoe inspection. Diabetic Foot Educ complications of diabetes. Patient  instructed on proper foot hygeine. We discussed wearing proper shoe gear, daily foot inspections, never walking without protective shoe gear, never putting sharp instruments to feet, routine podiatric exams.ation. Patient reminded of the importance of good nutrition and blood sugar control to help prevent podiatric    Future Appointments   Date Time Provider Department Center   2/10/2020 10:00 AM CHAIR 02 [facility] CHEMO [facility]   2/24/2020  8:20 AM LABORATORY, [facility] LAB [facility]   2/24/2020  9:00 AM Yesenia Archibald NP [facility] HEM ONC [facility]   2/24/2020  9:30 AM CHAIR 01 [facility] CHEMO [facility]   2/28/2020  8:20 AM SPECIMEN, [facility] SPECLAB [facility]   2/28/2020  9:40 AM LABORATORY, [facility] LAB [facility]   3/4/2020  9:00 AM Dudley Hernandez MD [facility] NEPHRO [facility]   3/17/2020  8:20 AM Rose Germain MD [facility]   5/1/2020 10:15 AM Anette Montanez DPM [facility] POD [place]       Report Electronically Signed By:    Anette Montanez DPM   Podiatric Medicine & Surgery  [facility] [place]  2/7/2020     Disclaimer:  This note may have been prepared using voice recognition software, it may have not been extensively proofed, as such there could be errors within the text such as sound alike errors.

## 2020-02-28 ENCOUNTER — LAB VISIT (OUTPATIENT)
Dept: LAB | Facility: HOSPITAL | Age: 75
End: 2020-02-28
Attending: NURSE PRACTITIONER
Payer: MEDICARE

## 2020-02-28 ENCOUNTER — PATIENT OUTREACH (OUTPATIENT)
Dept: ADMINISTRATIVE | Facility: OTHER | Age: 75
End: 2020-02-28

## 2020-02-28 ENCOUNTER — OFFICE VISIT (OUTPATIENT)
Dept: HEMATOLOGY/ONCOLOGY | Facility: CLINIC | Age: 75
End: 2020-02-28
Payer: MEDICARE

## 2020-02-28 VITALS
WEIGHT: 140 LBS | OXYGEN SATURATION: 99 % | RESPIRATION RATE: 17 BRPM | SYSTOLIC BLOOD PRESSURE: 169 MMHG | TEMPERATURE: 98 F | BODY MASS INDEX: 22.5 KG/M2 | HEART RATE: 73 BPM | DIASTOLIC BLOOD PRESSURE: 77 MMHG | HEIGHT: 66 IN

## 2020-02-28 DIAGNOSIS — N18.5 ANEMIA DUE TO STAGE 5 CHRONIC KIDNEY DISEASE: Primary | ICD-10-CM

## 2020-02-28 DIAGNOSIS — N18.5 ANEMIA DUE TO STAGE 5 CHRONIC KIDNEY DISEASE: ICD-10-CM

## 2020-02-28 DIAGNOSIS — I15.2 HYPERTENSION ASSOCIATED WITH DIABETES: ICD-10-CM

## 2020-02-28 DIAGNOSIS — D63.1 ANEMIA DUE TO STAGE 5 CHRONIC KIDNEY DISEASE: Primary | ICD-10-CM

## 2020-02-28 DIAGNOSIS — E11.59 HYPERTENSION ASSOCIATED WITH DIABETES: ICD-10-CM

## 2020-02-28 DIAGNOSIS — E11.3553 TYPE 2 DIABETES MELLITUS WITH STABLE PROLIFERATIVE RETINOPATHY OF BOTH EYES, WITH LONG-TERM CURRENT USE OF INSULIN: ICD-10-CM

## 2020-02-28 DIAGNOSIS — D63.1 ANEMIA DUE TO STAGE 5 CHRONIC KIDNEY DISEASE: ICD-10-CM

## 2020-02-28 DIAGNOSIS — Z79.4 TYPE 2 DIABETES MELLITUS WITH STABLE PROLIFERATIVE RETINOPATHY OF BOTH EYES, WITH LONG-TERM CURRENT USE OF INSULIN: ICD-10-CM

## 2020-02-28 DIAGNOSIS — E87.5 HYPERKALEMIA: ICD-10-CM

## 2020-02-28 LAB
BASOPHILS # BLD AUTO: 0.01 K/UL (ref 0–0.2)
BASOPHILS NFR BLD: 0.2 % (ref 0–1.9)
DIFFERENTIAL METHOD: ABNORMAL
EOSINOPHIL # BLD AUTO: 0 K/UL (ref 0–0.5)
EOSINOPHIL NFR BLD: 0.8 % (ref 0–8)
ERYTHROCYTE [DISTWIDTH] IN BLOOD BY AUTOMATED COUNT: 13.1 % (ref 11.5–14.5)
HCT VFR BLD AUTO: 31.9 % (ref 37–48.5)
HGB BLD-MCNC: 10.2 G/DL (ref 12–16)
IMM GRANULOCYTES # BLD AUTO: 0.01 K/UL (ref 0–0.04)
IMM GRANULOCYTES NFR BLD AUTO: 0.2 % (ref 0–0.5)
LYMPHOCYTES # BLD AUTO: 0.6 K/UL (ref 1–4.8)
LYMPHOCYTES NFR BLD: 12.7 % (ref 18–48)
MCH RBC QN AUTO: 30.7 PG (ref 27–31)
MCHC RBC AUTO-ENTMCNC: 32 G/DL (ref 32–36)
MCV RBC AUTO: 96 FL (ref 82–98)
MONOCYTES # BLD AUTO: 0.3 K/UL (ref 0.3–1)
MONOCYTES NFR BLD: 6.7 % (ref 4–15)
NEUTROPHILS # BLD AUTO: 4 K/UL (ref 1.8–7.7)
NEUTROPHILS NFR BLD: 79.6 % (ref 38–73)
NRBC BLD-RTO: 0 /100 WBC
PLATELET # BLD AUTO: 162 K/UL (ref 150–350)
PMV BLD AUTO: 9.5 FL (ref 9.2–12.9)
RBC # BLD AUTO: 3.32 M/UL (ref 4–5.4)
WBC # BLD AUTO: 5.05 K/UL (ref 3.9–12.7)

## 2020-02-28 PROCEDURE — 2024F 7 FLD RTA PHOTO EVC RTNOPTHY: CPT | Mod: HCNC,S$GLB,, | Performed by: NURSE PRACTITIONER

## 2020-02-28 PROCEDURE — 1126F AMNT PAIN NOTED NONE PRSNT: CPT | Mod: HCNC,S$GLB,, | Performed by: NURSE PRACTITIONER

## 2020-02-28 PROCEDURE — 99999 PR PBB SHADOW E&M-EST. PATIENT-LVL III: ICD-10-PCS | Mod: PBBFAC,HCNC,, | Performed by: NURSE PRACTITIONER

## 2020-02-28 PROCEDURE — 1126F PR PAIN SEVERITY QUANTIFIED, NO PAIN PRESENT: ICD-10-PCS | Mod: HCNC,S$GLB,, | Performed by: NURSE PRACTITIONER

## 2020-02-28 PROCEDURE — 3077F PR MOST RECENT SYSTOLIC BLOOD PRESSURE >= 140 MM HG: ICD-10-PCS | Mod: HCNC,CPTII,S$GLB, | Performed by: NURSE PRACTITIONER

## 2020-02-28 PROCEDURE — 3046F PR MOST RECENT HEMOGLOBIN A1C LEVEL > 9.0%: ICD-10-PCS | Mod: HCNC,CPTII,S$GLB, | Performed by: NURSE PRACTITIONER

## 2020-02-28 PROCEDURE — 1101F PT FALLS ASSESS-DOCD LE1/YR: CPT | Mod: HCNC,CPTII,S$GLB, | Performed by: NURSE PRACTITIONER

## 2020-02-28 PROCEDURE — 99214 OFFICE O/P EST MOD 30 MIN: CPT | Mod: HCNC,S$GLB,, | Performed by: NURSE PRACTITIONER

## 2020-02-28 PROCEDURE — 2024F PR 7 FIELD PHOTOS WITH INTERP/ REVIEW: ICD-10-PCS | Mod: HCNC,S$GLB,, | Performed by: NURSE PRACTITIONER

## 2020-02-28 PROCEDURE — 1159F MED LIST DOCD IN RCRD: CPT | Mod: HCNC,S$GLB,, | Performed by: NURSE PRACTITIONER

## 2020-02-28 PROCEDURE — 99999 PR PBB SHADOW E&M-EST. PATIENT-LVL III: CPT | Mod: PBBFAC,HCNC,, | Performed by: NURSE PRACTITIONER

## 2020-02-28 PROCEDURE — 99214 PR OFFICE/OUTPT VISIT, EST, LEVL IV, 30-39 MIN: ICD-10-PCS | Mod: HCNC,S$GLB,, | Performed by: NURSE PRACTITIONER

## 2020-02-28 PROCEDURE — 3078F DIAST BP <80 MM HG: CPT | Mod: HCNC,CPTII,S$GLB, | Performed by: NURSE PRACTITIONER

## 2020-02-28 PROCEDURE — 1101F PR PT FALLS ASSESS DOC 0-1 FALLS W/OUT INJ PAST YR: ICD-10-PCS | Mod: HCNC,CPTII,S$GLB, | Performed by: NURSE PRACTITIONER

## 2020-02-28 PROCEDURE — 3078F PR MOST RECENT DIASTOLIC BLOOD PRESSURE < 80 MM HG: ICD-10-PCS | Mod: HCNC,CPTII,S$GLB, | Performed by: NURSE PRACTITIONER

## 2020-02-28 PROCEDURE — 1159F PR MEDICATION LIST DOCUMENTED IN MEDICAL RECORD: ICD-10-PCS | Mod: HCNC,S$GLB,, | Performed by: NURSE PRACTITIONER

## 2020-02-28 PROCEDURE — 36415 COLL VENOUS BLD VENIPUNCTURE: CPT | Mod: HCNC

## 2020-02-28 PROCEDURE — 3077F SYST BP >= 140 MM HG: CPT | Mod: HCNC,CPTII,S$GLB, | Performed by: NURSE PRACTITIONER

## 2020-02-28 PROCEDURE — 85025 COMPLETE CBC W/AUTO DIFF WBC: CPT | Mod: HCNC

## 2020-02-28 PROCEDURE — 3046F HEMOGLOBIN A1C LEVEL >9.0%: CPT | Mod: HCNC,CPTII,S$GLB, | Performed by: NURSE PRACTITIONER

## 2020-02-28 NOTE — PROGRESS NOTES
Subjective:      Patient ID: Avril Monzon is a 75 y.o. female.    Chief Complaint:  Lab Discussion - possible Retacrit    HPI:  Patient is a 75 year old female who presents today for chronic anemia.  She has a history of iron deficiency anemia and anemia due to CKD V not currently on dialysis.  She also has uncontrolled diabetes II with elevated A1c and hypertension - followed by PCP.  She is followed by nephrology for CKD IV - today with elevated potassium.  States that she has been eating oranges daily.  She is to receive Retacrit every 2 weeks for hemoglobin <10.  Last received on 2020 for hemoglobin 9.4.  Unsure why she did not received again 2 weeks later.      Today hemoglobin is 10.2     Social History     Socioeconomic History    Marital status:      Spouse name: Not on file    Number of children: Not on file    Years of education: Not on file    Highest education level: Not on file   Occupational History    Not on file   Social Needs    Financial resource strain: Not on file    Food insecurity:     Worry: Not on file     Inability: Not on file    Transportation needs:     Medical: Not on file     Non-medical: Not on file   Tobacco Use    Smoking status: Former Smoker     Packs/day: 1.50     Years: 30.00     Pack years: 45.00     Types: Cigarettes     Last attempt to quit: 1990     Years since quittin.1    Smokeless tobacco: Former User   Substance and Sexual Activity    Alcohol use: No     Alcohol/week: 0.0 standard drinks    Drug use: No    Sexual activity: Never   Lifestyle    Physical activity:     Days per week: Not on file     Minutes per session: Not on file    Stress: Not at all   Relationships    Social connections:     Talks on phone: Not on file     Gets together: Not on file     Attends Oriental orthodox service: Not on file     Active member of club or organization: Not on file     Attends meetings of clubs or organizations: Not on file     Relationship status: Not  on file   Other Topics Concern    Not on file   Social History Narrative    Not on file       Family History   Problem Relation Age of Onset    Diabetes Mother     Hyperlipidemia Mother     Hypertension Mother     Heart disease Mother         MI    Muscular dystrophy Son     Cancer Maternal Grandmother         colon       Past Surgical History:   Procedure Laterality Date    BREAST LUMPECTOMY Left     benign, when patient was 15 years old    BREAST MASS EXCISION      ,benign, age 15.    CATHETERIZATION OF BOTH LEFT AND RIGHT HEART N/A 11/28/2018    Procedure: CATHETERIZATION, HEART, BOTH LEFT AND RIGHT;  Surgeon: Michelle Holman MD;  Location: [facility] CATH LAB;  Service: Cardiology;  Laterality: N/A;    CATHETERIZATION OF BOTH LEFT AND RIGHT HEART N/A 11/30/2018    Procedure: CATHETERIZATION, HEART, BOTH LEFT AND RIGHT;  Surgeon: Michelle Holman MD;  Location: [facility] CATH LAB;  Service: Cardiology;  Laterality: N/A;    HYSTERECTOMY  1982    LEFT HEART CATHETERIZATION Left 12/3/2018    Procedure: CATHETERIZATION, HEART, LEFT;  Surgeon: Michelle Holman MD;  Location: [facility] CATH LAB;  Service: Cardiology;  Laterality: Left;  LAD ATHERECTOMY STENT/ FEMORAL APPROACH    OOPHORECTOMY      wrist cyst Right 1980s    dorsal wrist cyst       Past Medical History:   Diagnosis Date    Acute hypoxemic respiratory failure 11/26/2018    Anemia     Arthritis     back, hand, knees    Back pain     Cataract     CKD stage G4/A3, GFR 15 - 29 and albumin creatinine ratio >300 mg/g     GFR 40% Jan 2014 and 33% ub 3/2014 ([facility])    Coronary artery disease involving native coronary artery of native heart 11/30/2018    Diabetic retinopathy     DM (diabetes mellitus) type II controlled, neurological manifestation     Exudative age-related macular degeneration of left eye with active choroidal neovascularization 2/5/2019    GERD (gastroesophageal reflux disease)     Glaucoma     Heart failure     Hyperlipidemia      Hypertension     Non-ST elevation MI (NSTEMI) 11/28/2018    NSTEMI (non-ST elevated myocardial infarction) 11/27/2018    Peripheral neuropathy     Polyneuropathy     Proteinuria     >6 mo     Seizures     [facility] 1/2014 -dick CT NRI showed small vessel    Stroke 2012,2014    Tobacco dependence     Type 2 diabetes with peripheral circulatory disorder, controlled        Review of Systems   Constitutional: Negative for activity change, appetite change, chills, diaphoresis, fatigue, fever and unexpected weight change.   HENT: Negative for congestion, nosebleeds, sore throat and trouble swallowing.    Respiratory: Positive for shortness of breath (with exertion). Negative for cough, chest tightness and wheezing.    Cardiovascular: Negative for chest pain, palpitations and leg swelling.   Gastrointestinal: Negative for abdominal pain, anal bleeding, blood in stool, constipation, diarrhea, nausea and vomiting.   Genitourinary: Negative for dysuria and hematuria.   Musculoskeletal: Negative for arthralgias, gait problem and myalgias.   Skin: Negative for rash and wound.   Neurological: Negative for dizziness, syncope, speech difficulty, weakness and headaches.   Hematological: Negative for adenopathy. Does not bruise/bleed easily.   Psychiatric/Behavioral: Negative for confusion. The patient is not nervous/anxious.           Medication List with Changes/Refills   Current Medications    ACCU-CHEK ARDEN PLUS METER [facility]    USE TO TEST TWICE DAILY    AMLODIPINE (NORVASC) 5 MG TABLET    Take 5 mg by mouth once daily.    ASPIRIN (ECOTRIN) 81 MG EC TABLET    Take 1 tablet (81 mg total) by mouth once daily.    ATORVASTATIN (LIPITOR) 80 MG TABLET    Take 1 tablet (80 mg total) by mouth once daily.    BLOOD SUGAR DIAGNOSTIC STRP    1 strip by Misc.(Non-Drug; Combo Route) route 3 (three) times daily. relion ultima    CALCIFEDIOL (RAYALDEE) 30 MCG CS24        CALCITRIOL (ROCALTROL) 0.25 MCG CAP    Take 1 capsule (0.25 mcg total) by  mouth once daily.    CLOPIDOGREL (PLAVIX) 75 MG TABLET    TAKE 1 TABLET BY MOUTH ONCE DAILY    FLUTICASONE PROPIONATE (FLONASE) 50 MCG/ACTUATION NASAL SPRAY    1 spray (50 mcg total) by Each Nostril route once daily.    FUROSEMIDE (LASIX) 20 MG TABLET        INSULIN NPH-INSULIN REGULAR, 70/30, (NOVOLIN 70/30) 100 UNIT/ML (70-30) INJECTION    INJECT SUBCUTANEOUSLY 30 UNITS IN THE MORNING AND INJECT 25 UNITS IN THE EVENING    ISOSORBIDE-HYDRALAZINE 20-37.5 MG (BIDIL) 20-37.5 MG TAB    Take 2 tablets by mouth 3 (three) times daily.    LANCETS (ACCU-CHEK SOFTCLIX LANCETS) MISC    1 lancet by Misc.(Non-Drug; Combo Route) route 3 (three) times daily.    LORATADINE (CLARITIN) 10 MG TABLET    Take 1 tablet (10 mg total) by mouth once daily.    METOPROLOL TARTRATE (LOPRESSOR) 25 MG TABLET    TAKE 1 TABLET BY MOUTH TWICE DAILY    NITROGLYCERIN (NITROSTAT) 0.4 MG SL TABLET    Place 1 tablet (0.4 mg total) under the tongue every 5 (five) minutes as needed for Chest pain.    ONDANSETRON (ZOFRAN-ODT) 4 MG TBDL    DISSOLVE 1 TABLET IN MOUTH THREE TIMES DAILY AS NEEDED FOR NAUSEA FOR 5 DAYS    SODIUM BICARBONATE 325 MG TABLET    TAKE 1 TABLET BY MOUTH THREE TIMES DAILY    TORSEMIDE (DEMADEX) 20 MG TAB    Take 2 tablets (40 mg total) by mouth once daily.        Objective:     Vitals:    02/28/20 1355   BP: (!) 169/77   Pulse: 73   Resp: 17   Temp: 98 °F (36.7 °C)       Physical Exam   Constitutional: She is oriented to person, place, and time. Vital signs are normal. She appears well-developed and well-nourished.  Non-toxic appearance. She does not have a sickly appearance. She does not appear ill. No distress.   HENT:   Head: Normocephalic and atraumatic.   Right Ear: External ear normal.   Left Ear: External ear normal.   Nose: Nose normal.   Eyes: Conjunctivae, EOM and lids are normal. Right eye exhibits no discharge. Left eye exhibits no discharge. No scleral icterus.   Cardiovascular: Normal rate, regular rhythm, S1 normal, S2  normal and normal heart sounds. Exam reveals no gallop and no friction rub.   No murmur heard.  Pulmonary/Chest: Effort normal and breath sounds normal. No accessory muscle usage or stridor. No apnea, no tachypnea and no bradypnea. No respiratory distress. She has no decreased breath sounds. She has no wheezes. She has no rales. She exhibits no tenderness.   Abdominal: Soft. Normal appearance and bowel sounds are normal. She exhibits no distension.   Musculoskeletal: Normal range of motion.   Neurological: She is alert and oriented to person, place, and time.   Skin: Skin is warm, dry and intact. No bruising, no lesion and no rash noted. She is not diaphoretic. No pallor.   Psychiatric: She has a normal mood and affect. Her speech is normal and behavior is normal. Judgment and thought content normal. She is not actively hallucinating. Cognition and memory are normal. She is attentive.   Nursing note and vitals reviewed.      Assessment:     Problem List Items Addressed This Visit        Ophtho    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, with long-term current use of insulin       Cardiac/Vascular    Hypertension associated with diabetes (Chronic)       Renal/    Anemia due to stage 5 chronic kidney disease - Primary    Relevant Orders    CBC auto differential (Completed)    CBC auto differential    Comprehensive metabolic panel      Other Visit Diagnoses     Hyperkalemia            Lab Results   Component Value Date    CREATININE 5.0 (H) 02/28/2020    BUN 61 (H) 02/28/2020     02/28/2020    K 5.9 (H) 02/28/2020     02/28/2020    CO2 21 (L) 02/28/2020     Lab Results   Component Value Date    WBC 5.05 02/28/2020    HGB 10.2 (L) 02/28/2020    HCT 31.9 (L) 02/28/2020    MCV 96 02/28/2020     02/28/2020         Plan:   Anemia due to stage 5 chronic kidney disease  -     CBC auto differential; Future; Expected date: 02/28/2020  -     CBC auto differential; Future;  Expected date: 02/28/2020  -     Comprehensive metabolic panel; Future; Expected date: 02/28/2020    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, with long-term current use of insulin    Hyperkalemia    Hypertension associated with diabetes    Avoid potassium rich foods - list of foods discussed with patient including oranges that she has been eating daily.  F/U with PCP/nephrology for BP management - avoid NSAIDs and Baclofen per nephrology. No Retacrit needed today.  Follow up in one month with cbc, cmp - possible Retacrit 20,000 units for hemoglobin < 10.        Thank You,  FIDENCIO RoeC

## 2020-03-02 ENCOUNTER — TELEPHONE (OUTPATIENT)
Dept: NEPHROLOGY | Facility: CLINIC | Age: 75
End: 2020-03-02

## 2020-03-02 DIAGNOSIS — N18.5 CHRONIC KIDNEY DISEASE (CKD), STAGE V: Primary | ICD-10-CM

## 2020-03-02 NOTE — PROGRESS NOTES
Lab Results   Component Value Date    CREATININE 5.0 (H) 02/28/2020    BUN 61 (H) 02/28/2020     02/28/2020    K 5.9 (H) 02/28/2020     02/28/2020    CO2 21 (L) 02/28/2020       Please educate patient about low-potassium diet, repeat lab in 2 days before visit    Dr Bryan

## 2020-03-02 NOTE — TELEPHONE ENCOUNTER
Let her know  wants labs 2 days before appt. She wants to come do it the same day. 3/2/2020./0936/sf

## 2020-03-03 ENCOUNTER — PATIENT OUTREACH (OUTPATIENT)
Dept: ADMINISTRATIVE | Facility: HOSPITAL | Age: 75
End: 2020-03-03

## 2020-03-03 ENCOUNTER — LAB VISIT (OUTPATIENT)
Dept: LAB | Facility: HOSPITAL | Age: 75
End: 2020-03-03
Attending: INTERNAL MEDICINE
Payer: MEDICARE

## 2020-03-03 ENCOUNTER — TELEPHONE (OUTPATIENT)
Dept: NEPHROLOGY | Facility: CLINIC | Age: 75
End: 2020-03-03

## 2020-03-03 DIAGNOSIS — N18.5 CHRONIC KIDNEY DISEASE (CKD), STAGE V: ICD-10-CM

## 2020-03-03 LAB
ALBUMIN SERPL BCP-MCNC: 3.8 G/DL (ref 3.5–5.2)
ANION GAP SERPL CALC-SCNC: 13 MMOL/L (ref 8–16)
BUN SERPL-MCNC: 89 MG/DL (ref 8–23)
CALCIUM SERPL-MCNC: 9.4 MG/DL (ref 8.7–10.5)
CHLORIDE SERPL-SCNC: 107 MMOL/L (ref 95–110)
CO2 SERPL-SCNC: 21 MMOL/L (ref 23–29)
CREAT SERPL-MCNC: 5.4 MG/DL (ref 0.5–1.4)
EST. GFR  (AFRICAN AMERICAN): 8 ML/MIN/1.73 M^2
EST. GFR  (NON AFRICAN AMERICAN): 7 ML/MIN/1.73 M^2
GLUCOSE SERPL-MCNC: 155 MG/DL (ref 70–110)
PHOSPHATE SERPL-MCNC: 4.7 MG/DL (ref 2.7–4.5)
POTASSIUM SERPL-SCNC: 5.5 MMOL/L (ref 3.5–5.1)
SODIUM SERPL-SCNC: 141 MMOL/L (ref 136–145)

## 2020-03-03 PROCEDURE — 36415 COLL VENOUS BLD VENIPUNCTURE: CPT | Mod: HCNC

## 2020-03-03 PROCEDURE — 80069 RENAL FUNCTION PANEL: CPT | Mod: HCNC

## 2020-03-03 NOTE — PROGRESS NOTES
PreVisit Chart Audit Perfomed   Need AYO for Eye Exam       ,Maricel JARRELL LPN Care Coordinator  Care Coordination Department  [facility]  [phone]

## 2020-03-03 NOTE — TELEPHONE ENCOUNTER
Called and spoke with patient. Informed of [facility] details. Patient will also do her labs today for follow up with Dr. Hernandez on tomorrow.

## 2020-03-03 NOTE — TELEPHONE ENCOUNTER
----- Message from Buster Arias sent at 3/3/2020  7:53 AM CST -----  Contact: Pt  Please call Avril regarding a class she thinks she has today [phone] (home).

## 2020-03-04 ENCOUNTER — OFFICE VISIT (OUTPATIENT)
Dept: NEPHROLOGY | Facility: CLINIC | Age: 75
End: 2020-03-04
Payer: MEDICARE

## 2020-03-04 VITALS
WEIGHT: 141.31 LBS | SYSTOLIC BLOOD PRESSURE: 154 MMHG | HEART RATE: 79 BPM | DIASTOLIC BLOOD PRESSURE: 70 MMHG | HEIGHT: 65 IN | BODY MASS INDEX: 23.54 KG/M2

## 2020-03-04 DIAGNOSIS — D63.1 ANEMIA OF CHRONIC RENAL FAILURE, STAGE 5: ICD-10-CM

## 2020-03-04 DIAGNOSIS — N18.5 CKD (CHRONIC KIDNEY DISEASE) STAGE 5, GFR LESS THAN 15 ML/MIN: Primary | ICD-10-CM

## 2020-03-04 DIAGNOSIS — N18.5 ANEMIA OF CHRONIC RENAL FAILURE, STAGE 5: ICD-10-CM

## 2020-03-04 PROCEDURE — 3078F DIAST BP <80 MM HG: CPT | Mod: HCNC,CPTII,S$GLB, | Performed by: INTERNAL MEDICINE

## 2020-03-04 PROCEDURE — 99499 UNLISTED E&M SERVICE: CPT | Mod: HCNC,S$GLB,, | Performed by: INTERNAL MEDICINE

## 2020-03-04 PROCEDURE — 1125F PR PAIN SEVERITY QUANTIFIED, PAIN PRESENT: ICD-10-PCS | Mod: HCNC,S$GLB,, | Performed by: INTERNAL MEDICINE

## 2020-03-04 PROCEDURE — 99999 PR PBB SHADOW E&M-EST. PATIENT-LVL III: CPT | Mod: PBBFAC,HCNC,, | Performed by: INTERNAL MEDICINE

## 2020-03-04 PROCEDURE — 3078F PR MOST RECENT DIASTOLIC BLOOD PRESSURE < 80 MM HG: ICD-10-PCS | Mod: HCNC,CPTII,S$GLB, | Performed by: INTERNAL MEDICINE

## 2020-03-04 PROCEDURE — 99999 PR PBB SHADOW E&M-EST. PATIENT-LVL III: ICD-10-PCS | Mod: PBBFAC,HCNC,, | Performed by: INTERNAL MEDICINE

## 2020-03-04 PROCEDURE — 1101F PR PT FALLS ASSESS DOC 0-1 FALLS W/OUT INJ PAST YR: ICD-10-PCS | Mod: HCNC,CPTII,S$GLB, | Performed by: INTERNAL MEDICINE

## 2020-03-04 PROCEDURE — 3077F SYST BP >= 140 MM HG: CPT | Mod: HCNC,CPTII,S$GLB, | Performed by: INTERNAL MEDICINE

## 2020-03-04 PROCEDURE — 1159F PR MEDICATION LIST DOCUMENTED IN MEDICAL RECORD: ICD-10-PCS | Mod: HCNC,S$GLB,, | Performed by: INTERNAL MEDICINE

## 2020-03-04 PROCEDURE — 99214 PR OFFICE/OUTPT VISIT, EST, LEVL IV, 30-39 MIN: ICD-10-PCS | Mod: HCNC,S$GLB,, | Performed by: INTERNAL MEDICINE

## 2020-03-04 PROCEDURE — 1125F AMNT PAIN NOTED PAIN PRSNT: CPT | Mod: HCNC,S$GLB,, | Performed by: INTERNAL MEDICINE

## 2020-03-04 PROCEDURE — 3077F PR MOST RECENT SYSTOLIC BLOOD PRESSURE >= 140 MM HG: ICD-10-PCS | Mod: HCNC,CPTII,S$GLB, | Performed by: INTERNAL MEDICINE

## 2020-03-04 PROCEDURE — 1101F PT FALLS ASSESS-DOCD LE1/YR: CPT | Mod: HCNC,CPTII,S$GLB, | Performed by: INTERNAL MEDICINE

## 2020-03-04 PROCEDURE — 99214 OFFICE O/P EST MOD 30 MIN: CPT | Mod: HCNC,S$GLB,, | Performed by: INTERNAL MEDICINE

## 2020-03-04 PROCEDURE — 99499 RISK ADDL DX/OHS AUDIT: ICD-10-PCS | Mod: HCNC,S$GLB,, | Performed by: INTERNAL MEDICINE

## 2020-03-04 PROCEDURE — 1159F MED LIST DOCD IN RCRD: CPT | Mod: HCNC,S$GLB,, | Performed by: INTERNAL MEDICINE

## 2020-03-04 NOTE — PROGRESS NOTES
Subjective:       Patient ID: Avril Monzon is a 75 y.o.    female who presents for follow-up evaluation of CKD stage 5, HTN, Anemia        Rose Parks MD      Follow-up   Pertinent negatives include no abdominal pain, chest pain, coughing, fatigue, fever, headaches, joint swelling, nausea, rash, sore throat, vomiting or weakness.    : Avril Monzon is a pleasant 75-year-old  woman seen in office today in follow-up for above medical problems.  I saw her about 9 months ago, she was lost to follow-up, progressive decline in renal function explained to the patient, recent serum creatinine about 2 months ago was 5.3 mg/dL, repeat labs from today pending at this time, she has history of hypertension, CVA, coronary artery disease status post angioplasty, peripheral arterial disease, dyslipidemia, anemia for which she is closely followed by Hematology Department, at the previous visit I refer her for a kidney transplant evaluation, do not think she pursued this further, again will schedule her for kidney education/options class.      March 4, 2020:  Patient denies any appetite loss, nausea/vomiting loss of energy, LE edema, SOB. She has attended CKD teaching class. Patient was referred for renal transplant. Creatinine now at 5.4. Patient suffers from severe vision loss.           Past Medical History:   Diagnosis Date    Acute hypoxemic respiratory failure 11/26/2018    Anemia     Arthritis     back, hand, knees    Back pain     Cataract     CKD stage G4/A3, GFR 15 - 29 and albumin creatinine ratio >300 mg/g     GFR 40% Jan 2014 and 33% ub 3/2014 ([facility])    Coronary artery disease involving native coronary artery of native heart 11/30/2018    Diabetic retinopathy     DM (diabetes mellitus) type II controlled, neurological manifestation     Exudative age-related macular degeneration of left eye with active choroidal neovascularization 2/5/2019    GERD (gastroesophageal reflux  disease)     Glaucoma     Heart failure     Hyperlipidemia     Hypertension     Non-ST elevation MI (NSTEMI) 11/28/2018    NSTEMI (non-ST elevated myocardial infarction) 11/27/2018    Peripheral neuropathy     Polyneuropathy     Proteinuria     >6 mo     Seizures     [facility] 1/2014 -dick CT NRI showed small vessel    Stroke 2012,2014    Tobacco dependence     Type 2 diabetes with peripheral circulatory disorder, controlled        Current Outpatient Medications on File Prior to Visit   Medication Sig Dispense Refill    ACCU-CHEK ARDEN PLUS METER Misc USE TO TEST TWICE DAILY  0    amLODIPine (NORVASC) 5 MG tablet Take 5 mg by mouth once daily.      aspirin (ECOTRIN) 81 MG EC tablet Take 1 tablet (81 mg total) by mouth once daily.  0    atorvastatin (LIPITOR) 80 MG tablet Take 1 tablet (80 mg total) by mouth once daily. (Patient taking differently: Take 40 mg by mouth once daily. ) 30 tablet 11    blood sugar diagnostic Strp 1 strip by Misc.(Non-Drug; Combo Route) route 3 (three) times daily. relion ultima 100 strip 11    calcifediol (RAYALDEE) 30 mcg Cs24       calcitRIOL (ROCALTROL) 0.25 MCG Cap Take 1 capsule (0.25 mcg total) by mouth once daily. 30 capsule 11    clopidogrel (PLAVIX) 75 mg tablet TAKE 1 TABLET BY MOUTH ONCE DAILY 39 tablet 6    fluticasone propionate (FLONASE) 50 mcg/actuation nasal spray 1 spray (50 mcg total) by Each Nostril route once daily. 16 g 0    furosemide (LASIX) 20 MG tablet       insulin NPH-insulin regular, 70/30, (NOVOLIN 70/30) 100 unit/mL (70-30) injection INJECT SUBCUTANEOUSLY 30 UNITS IN THE MORNING AND INJECT 25 UNITS IN THE EVENING (Patient taking differently: INJECT SUBCUTANEOUSLY 30 UNITS IN THE MORNING AND INJECT 25 UNITS IN THE EVENING) 2 vial 3    isosorbide-hydrALAZINE 20-37.5 mg (BIDIL) 20-37.5 mg Tab Take 2 tablets by mouth 3 (three) times daily. 180 tablet 11    lancets (ACCU-CHEK SOFTCLIX LANCETS) Misc 1 lancet by Misc.(Non-Drug; Combo Route) route  3 (three) times daily. 100 each 11    loratadine (CLARITIN) 10 mg tablet Take 1 tablet (10 mg total) by mouth once daily. 30 tablet 0    metoprolol tartrate (LOPRESSOR) 25 MG tablet TAKE 1 TABLET BY MOUTH TWICE DAILY 60 tablet 6    nitroGLYCERIN (NITROSTAT) 0.4 MG SL tablet Place 1 tablet (0.4 mg total) under the tongue every 5 (five) minutes as needed for Chest pain. 20 tablet 0    ondansetron (ZOFRAN-ODT) 4 MG TbDL DISSOLVE 1 TABLET IN MOUTH THREE TIMES DAILY AS NEEDED FOR NAUSEA FOR 5 DAYS  0    sodium bicarbonate 325 MG tablet TAKE 1 TABLET BY MOUTH THREE TIMES DAILY 90 tablet 11    torsemide (DEMADEX) 20 MG Tab Take 2 tablets (40 mg total) by mouth once daily. 180 tablet 3     Current Facility-Administered Medications on File Prior to Visit   Medication Dose Route Frequency Provider Last Rate Last Dose    aflibercept Soln 2 mg  2 mg Intravitreal 1 time in Clinic/HOD JADIEL Coello MD             Review of Systems   Constitutional: Negative for appetite change, fatigue and fever.   HENT: Negative for facial swelling, sore throat, trouble swallowing and voice change.    Eyes: Positive for visual disturbance. Negative for redness.   Respiratory: Negative for apnea, cough, chest tightness, shortness of breath and wheezing.    Cardiovascular: Negative for chest pain, palpitations and leg swelling.   Gastrointestinal: Negative for abdominal distention, abdominal pain, blood in stool, constipation, diarrhea, nausea and vomiting.   Genitourinary: Negative for decreased urine volume, difficulty urinating, dysuria, flank pain, frequency, hematuria, pelvic pain and urgency.   Musculoskeletal: Negative for back pain, gait problem and joint swelling.   Skin: Negative for color change and rash.   Neurological: Negative for dizziness, syncope, weakness and headaches.   Hematological: Does not bruise/bleed easily.   Psychiatric/Behavioral: Negative for agitation, behavioral problems and confusion. The patient is not  "nervous/anxious.              Objective:         BP (!) 154/70   Pulse 79   Ht 5' 5" (1.651 m)   Wt 64.1 kg (141 lb 5 oz)   LMP  (LMP Unknown)   BMI 23.52 kg/m²         Constitutional: She is oriented to person, place, and time. She appears well-developed and well-nourished. No distress.   HENT: Head: Normocephalic and atraumatic. Mouth/Throat: Oropharynx is clear and moist. No oropharyngeal exudate.   Eyes: Conjunctivae and EOM are normal. Pupils are equal, round, and reactive to light.   Neck: Normal range of motion. Neck supple. No JVD present. Carotid bruit is not present. No tracheal deviation present. No thyroid mass and no thyromegaly present.   Cardiovascular: Normal rate, regular rhythm and intact distal pulses. Exam reveals no gallop and no friction rub. Murmur heard. SM in AA  Pulmonary/Chest: Effort normal and breath sounds normal. No respiratory distress. She has no wheezes. She has no rales. She exhibits no tenderness.   Abdominal: Soft. Bowel sounds are normal. She exhibits no distension, no abdominal bruit, no ascites and no mass. There is no hepatosplenomegaly. There is no tenderness. There is no rebound, no guarding and no CVA tenderness.   Musculoskeletal: She exhibits no edema or tenderness.   Lymphadenopathy:   She has no cervical adenopathy.   Neurological: She is alert and oriented to person, place, and time.  No cranial nerve deficit. She exhibits normal muscle tone. Coordination normal.   Skin: Skin is warm and intact. No rash noted. No erythema. No pallor.   Psychiatric: She has a normal mood and affect. Her behavior is normal. Judgment normal.          Labs:    Lab Results   Component Value Date    CREATININE 5.4 (H) 03/03/2020    BUN 89 (H) 03/03/2020     03/03/2020    K 5.5 (H) 03/03/2020     03/03/2020    CO2 21 (L) 03/03/2020       Lab Results   Component Value Date    WBC 4.79 01/17/2020    HGB 8.9 (L) 01/17/2020    HCT 28.2 (L) 01/17/2020    MCV 97 01/17/2020    PLT " 177 01/17/2020         Lab Results   Component Value Date    .0 (H) 01/31/2020    CALCIUM 9.4 03/03/2020    PHOS 4.7 (H) 03/03/2020       Lab Results   Component Value Date    ALBUMIN 3.8 03/03/2020       Lab Results   Component Value Date    URICACID 8.2 (H) 05/15/2019       Lab Results   Component Value Date    HGBA1C 12.9 (H) 12/09/2019       Impression and Plan :  75-year-old  woman seen in office today in follow-up for following medical problems         1. CKD stage 5 :  Due to longstanding history of hypertension and type 2 diabetes, poorly controlled conditions, again discussed importance of controlling these chronic conditions in preserving renal function, talked about renal replacement therapy, patient reports poor support at home and she has a poor vision, so peritoneal dialysis can be a challenge, she was asked to make modality decision upon next visit (patient likely will be started on HD with AVG to be placed), she has attended chronic kidney disease education class,  referral for kidney transplant evaluation has been made. No indication for dialysis start at present.     2.  Hypertension - BP is mildly elevated because she took her meds late. She was asked to bring home BP readings during next visit.      3.  Congestive heart failure -   currently on torsemide, discussed salt and fluid restriction, compensated at present.      Also pulmonary hypertension reported on the recent echocardiogram, fluid management can be a challenge in view of her pulmonary hypertension,     4. Anemia of CKD :    follow-up with Hematology Department, iron replacement/Procrit injections,     5.  Secondary hyperparathyroidism - continue calcitriol 0.25 mcg daily.    6. Hyperkalemia: low K diet was started.         Return to clinic in about 3 weeks.

## 2020-03-17 ENCOUNTER — TELEPHONE (OUTPATIENT)
Dept: FAMILY MEDICINE | Facility: CLINIC | Age: 75
End: 2020-03-17

## 2020-03-17 NOTE — TELEPHONE ENCOUNTER
----- Message from Jenni Humhpries sent at 3/17/2020  7:49 AM CDT -----  Contact: [phone]  Would like to consult with the nurse, patient was told to Reschedule her Appt, patient would like to speak with the nurse con cering getting a sooner Appt to be seen in the office,  Please call back at [phone], Thanks sj  .Type:  Sooner Apoointment Request    Caller is requesting a sooner appointment.  Caller declined first available appointment listed below.  Caller will not accept being placed on the waitlist and is requesting a message be sent to doctor.  Name of Caller: Ms lundberg  When is the first available appointment?june  Symptoms:follow up  Would the patient rather a call back or a response via [facility]? callback  Best Call Back Number:[phone]  Additional Information:

## 2020-04-07 DIAGNOSIS — N18.5 STAGE 5 CHRONIC KIDNEY DISEASE: Primary | ICD-10-CM

## 2020-04-08 ENCOUNTER — LAB VISIT (OUTPATIENT)
Dept: LAB | Facility: HOSPITAL | Age: 75
End: 2020-04-08
Attending: INTERNAL MEDICINE
Payer: MEDICARE

## 2020-04-08 DIAGNOSIS — N18.5 STAGE 5 CHRONIC KIDNEY DISEASE: ICD-10-CM

## 2020-04-08 LAB
ALBUMIN SERPL BCP-MCNC: 3.8 G/DL (ref 3.5–5.2)
ANION GAP SERPL CALC-SCNC: 13 MMOL/L (ref 8–16)
BUN SERPL-MCNC: 96 MG/DL (ref 8–23)
CALCIUM SERPL-MCNC: 9.6 MG/DL (ref 8.7–10.5)
CHLORIDE SERPL-SCNC: 105 MMOL/L (ref 95–110)
CO2 SERPL-SCNC: 24 MMOL/L (ref 23–29)
CREAT SERPL-MCNC: 6.1 MG/DL (ref 0.5–1.4)
EST. GFR  (AFRICAN AMERICAN): 7 ML/MIN/1.73 M^2
EST. GFR  (NON AFRICAN AMERICAN): 6 ML/MIN/1.73 M^2
GLUCOSE SERPL-MCNC: 181 MG/DL (ref 70–110)
PHOSPHATE SERPL-MCNC: 4.9 MG/DL (ref 2.7–4.5)
POTASSIUM SERPL-SCNC: 5 MMOL/L (ref 3.5–5.1)
PTH-INTACT SERPL-MCNC: 391 PG/ML (ref 9–77)
SODIUM SERPL-SCNC: 142 MMOL/L (ref 136–145)

## 2020-04-08 PROCEDURE — 83970 ASSAY OF PARATHORMONE: CPT | Mod: HCNC,TXP

## 2020-04-08 PROCEDURE — 36415 COLL VENOUS BLD VENIPUNCTURE: CPT | Mod: HCNC,TXP

## 2020-04-08 PROCEDURE — 80069 RENAL FUNCTION PANEL: CPT | Mod: HCNC,NTX

## 2020-04-09 ENCOUNTER — OFFICE VISIT (OUTPATIENT)
Dept: NEPHROLOGY | Facility: CLINIC | Age: 75
End: 2020-04-09
Payer: MEDICARE

## 2020-04-09 DIAGNOSIS — I10 ESSENTIAL HYPERTENSION: ICD-10-CM

## 2020-04-09 DIAGNOSIS — N18.5 ANEMIA OF CHRONIC RENAL FAILURE, STAGE 5: ICD-10-CM

## 2020-04-09 DIAGNOSIS — E11.22 STAGE 5 CHRONIC KIDNEY DISEASE DUE TO TYPE 2 DIABETES MELLITUS: Primary | ICD-10-CM

## 2020-04-09 DIAGNOSIS — N19 UREMIA: ICD-10-CM

## 2020-04-09 DIAGNOSIS — N18.5 STAGE 5 CHRONIC KIDNEY DISEASE DUE TO TYPE 2 DIABETES MELLITUS: Primary | ICD-10-CM

## 2020-04-09 DIAGNOSIS — E11.21 NEPHROTIC SYNDROME DUE TO DIABETES MELLITUS: ICD-10-CM

## 2020-04-09 DIAGNOSIS — Z71.89 ENCOUNTER FOR MEDICATION REVIEW AND COUNSELING: ICD-10-CM

## 2020-04-09 DIAGNOSIS — N25.81 SECONDARY HYPERPARATHYROIDISM: ICD-10-CM

## 2020-04-09 DIAGNOSIS — D63.1 ANEMIA OF CHRONIC RENAL FAILURE, STAGE 5: ICD-10-CM

## 2020-04-09 DIAGNOSIS — E87.5 HYPERKALEMIA: ICD-10-CM

## 2020-04-09 PROCEDURE — 99443 PR PHYSICIAN TELEPHONE EVALUATION 21-30 MIN: ICD-10-PCS | Mod: HCNC,95,, | Performed by: INTERNAL MEDICINE

## 2020-04-09 PROCEDURE — 2024F 7 FLD RTA PHOTO EVC RTNOPTHY: CPT | Mod: HCNC,95,, | Performed by: INTERNAL MEDICINE

## 2020-04-09 PROCEDURE — 1101F PT FALLS ASSESS-DOCD LE1/YR: CPT | Mod: HCNC,CPTII,95, | Performed by: INTERNAL MEDICINE

## 2020-04-09 PROCEDURE — 3046F HEMOGLOBIN A1C LEVEL >9.0%: CPT | Mod: HCNC,CPTII,95, | Performed by: INTERNAL MEDICINE

## 2020-04-09 PROCEDURE — 2024F PR 7 FIELD PHOTOS WITH INTERP/ REVIEW: ICD-10-PCS | Mod: HCNC,95,, | Performed by: INTERNAL MEDICINE

## 2020-04-09 PROCEDURE — 1159F PR MEDICATION LIST DOCUMENTED IN MEDICAL RECORD: ICD-10-PCS | Mod: HCNC,95,, | Performed by: INTERNAL MEDICINE

## 2020-04-09 PROCEDURE — 1101F PR PT FALLS ASSESS DOC 0-1 FALLS W/OUT INJ PAST YR: ICD-10-PCS | Mod: HCNC,CPTII,95, | Performed by: INTERNAL MEDICINE

## 2020-04-09 PROCEDURE — 1159F MED LIST DOCD IN RCRD: CPT | Mod: HCNC,95,, | Performed by: INTERNAL MEDICINE

## 2020-04-09 PROCEDURE — 3046F PR MOST RECENT HEMOGLOBIN A1C LEVEL > 9.0%: ICD-10-PCS | Mod: HCNC,CPTII,95, | Performed by: INTERNAL MEDICINE

## 2020-04-09 PROCEDURE — 99443 PR PHYSICIAN TELEPHONE EVALUATION 21-30 MIN: CPT | Mod: HCNC,95,, | Performed by: INTERNAL MEDICINE

## 2020-04-09 NOTE — PROGRESS NOTES
NEPHROLOGY CLINIC FOLLOWUP NOTE:  Date of visit: 4/9/20  Pt visited virtually by a phone call due to coronavirus. Pt did not have the means to connect by internet/video     REASON FOR FOLLOWUP AND CHIEF COMPLAINT:  Advanced chronic kidney disease stage 5     HISTORY OF PRESENT ILLNESS:  Ms. Monzon is a 75-year-old AA female with a history of slowly progressive CKD stage V, diabetes mellitus, and hypertension, who presents for followup. Pt has nephrotic syndrome suspected due to diabetic nephropathy. Pt used to see me in renal clinic (last seen Dec 2018) and more recently by Dr. Hernandez (March 2020). Pt has had slowly progressive CKD. She is now pre-ESRD. She was advised in the past that she will need dialysis in the long term. As documented, multiple attempts have been made by different nephrologists to refer this pt to vascular. Due to poor eye sight and low motivation, pt is not a good candidate for PD.    To review, pt (as known to me prior to 2018) was non-compliant with BP meds and with diet. This was documented in renal noted before. On f/u today, pt has no new or acute c/o's, no SOB, no legs welling, no CP. Except for tiredness, pt has little sx's of uremia, no nausea, no loss of appetite, no wt loss, no itching, no sx's of polyuria.       PAST MEDICAL HISTORY:  1.  CKD stage V.  2.  Hypertension.  3.  Diabetic nephropathy and nephrotic syndrome.  4.  Diabetes mellitus for 25 years.  5.  Hyperlipidemia.  6.  Anemia related to CKD, following with Heme/Onc.  7.  Diabetic peripheral neuropathy.  8.  GERD.  9.  Seizure history, likely due to uncontrolled blood pressure in the past.     PAST SURGICAL HISTORY:  Reviewed and unchanged.     FAMILY HISTORY:  Reviewed and unchanged.  Multiple family members with diabetes   mellitus.     ALLERGIES:  Reviewed, to codeine.     SOCIAL HISTORY:  Quit smoking about 30 years ago.     MEDICATIONS:  Reviewed.      Current Outpatient Medications:     ACCU-CHEK ARDEN PLUS METER  Misc, USE TO TEST TWICE DAILY, Disp: , Rfl: 0    amLODIPine (NORVASC) 5 MG tablet, Take 5 mg by mouth once daily., Disp: , Rfl:     aspirin (ECOTRIN) 81 MG EC tablet, Take 1 tablet (81 mg total) by mouth once daily., Disp: , Rfl: 0    atorvastatin (LIPITOR) 80 MG tablet, Take 1 tablet (80 mg total) by mouth once daily. (Patient taking differently: Take 40 mg by mouth once daily. ), Disp: 30 tablet, Rfl: 11    blood sugar diagnostic Strp, 1 strip by Misc.(Non-Drug; Combo Route) route 3 (three) times daily. relion ultima, Disp: 100 strip, Rfl: 11    calcitRIOL (ROCALTROL) 0.25 MCG Cap, Take 1 capsule (0.25 mcg total) by mouth once daily., Disp: 30 capsule, Rfl: 11    clopidogrel (PLAVIX) 75 mg tablet, TAKE 1 TABLET BY MOUTH ONCE DAILY, Disp: 39 tablet, Rfl: 6    fluticasone propionate (FLONASE) 50 mcg/actuation nasal spray, 1 spray (50 mcg total) by Each Nostril route once daily., Disp: 16 g, Rfl: 0    furosemide (LASIX) 20 MG tablet, , Disp: , Rfl:     insulin NPH-insulin regular, 70/30, (NOVOLIN 70/30) 100 unit/mL (70-30) injection, INJECT SUBCUTANEOUSLY 30 UNITS IN THE MORNING AND INJECT 25 UNITS IN THE EVENING (Patient taking differently: INJECT SUBCUTANEOUSLY 30 UNITS IN THE MORNING AND INJECT 25 UNITS IN THE EVENING), Disp: 2 vial, Rfl: 3    isosorbide-hydrALAZINE 20-37.5 mg (BIDIL) 20-37.5 mg Tab, Take 2 tablets by mouth 3 (three) times daily., Disp: 180 tablet, Rfl: 11    lancets (ACCU-CHEK SOFTCLIX LANCETS) Misc, 1 lancet by Misc.(Non-Drug; Combo Route) route 3 (three) times daily., Disp: 100 each, Rfl: 11    loratadine (CLARITIN) 10 mg tablet, Take 1 tablet (10 mg total) by mouth once daily., Disp: 30 tablet, Rfl: 0    metoprolol tartrate (LOPRESSOR) 25 MG tablet, TAKE 1 TABLET BY MOUTH TWICE DAILY, Disp: 60 tablet, Rfl: 6    nitroGLYCERIN (NITROSTAT) 0.4 MG SL tablet, Place 1 tablet (0.4 mg total) under the tongue every 5 (five) minutes as needed for Chest pain., Disp: 20 tablet, Rfl: 0     ondansetron (ZOFRAN-ODT) 4 MG TbDL, DISSOLVE 1 TABLET IN MOUTH THREE TIMES DAILY AS NEEDED FOR NAUSEA FOR 5 DAYS, Disp: , Rfl: 0    sodium bicarbonate 325 MG tablet, TAKE 1 TABLET BY MOUTH THREE TIMES DAILY, Disp: 90 tablet, Rfl: 11    torsemide (DEMADEX) 20 MG Tab, Take 2 tablets (40 mg total) by mouth once daily., Disp: 180 tablet, Rfl: 3    Current Facility-Administered Medications:     aflibercept Soln 2 mg, 2 mg, Intravitreal, 1 time in Clinic/HOD, JADIEL Coello MD     REVIEW OF SYSTEMS:  No recent hospitalizations.  GENERAL:  Negative.  HEAD, EYES, EARS, NOSE, AND THROAT:  Negative.  CARDIAC:  Negative.  PULMONARY:  Negative.  GASTROINTESTINAL:  Negative.  GENITOURINARY:  Negative.  PSYCHOLOGICAL:  Negative.  NEUROLOGICAL:  Negative.  ENDOCRINE:  As above, otherwise negative.  HEMATOLOGIC AND ONCOLOGIC:  Negative.  INFECTIOUS DISEASE:  Negative.  The rest of the review of systems negative.     PHYSICAL EXAMINATION:  VITAL SIGNS:  could not assess today  On the phone, pt sounded comfortable, in NAD  Speech, mood, and thought process normal       LABS:  Reviewed.   BMP  Lab Results   Component Value Date     04/08/2020    K 5.0 04/08/2020     04/08/2020    CO2 24 04/08/2020    BUN 96 (H) 04/08/2020    CREATININE 6.1 (H) 04/08/2020    CALCIUM 9.6 04/08/2020    ANIONGAP 13 04/08/2020    ESTGFRAFRICA 7 (A) 04/08/2020    EGFRNONAA 6 (A) 04/08/2020     Lab Results   Component Value Date    WBC 5.05 02/28/2020    HGB 10.2 (L) 02/28/2020    HCT 31.9 (L) 02/28/2020    MCV 96 02/28/2020     02/28/2020       Lab Results   Component Value Date    .0 (H) 04/08/2020    CALCIUM 9.6 04/08/2020    PHOS 4.9 (H) 04/08/2020     Urine prot/Cr 1.3 g     ASSESSMENT AND PLAN:  A 75-year-old female with advanced chronic kidney disease, stage 5, who presents for f/u:    1.  Renal. s Cr stable and not worse. However, has slowly worsening renal function  Renal failure due to HTN, DM, prior smoking  BP  previously was uncontrolled  H/o of non-compliance with meds    Little or no sx's of uremia at this point  K normal to only borderline high (not on any hyperkalemic meds)  Acid base stable  No SOB  No indications for acute HD  Recommended to pt to start preparing for dialysis  Pt has poor sight and poor motivation, not a good candidate for PD  HD explained to pt    Agreed to see vascular  Appt was made to see Dr White on 4/16/20    2.  Hypertension. Could not be assessed today  Meds reviewed  Low salt diet advised    3.  Secondary hyperparathyroidism. On calcitriol. Continue.    4.  Anemia. Receiving Epogen and iron at Heme/Onc    5.  Proteinuria. Sub-nephrotic range proteinuria.   Can not take ACE-I or ARB due to borderline high K     PLANS AND RECOMMENDATIONS:  Discussed with the patient as above.    Opportunity for questions and discussion provided.  RTC 1 month, recommend actual visit, complicated (virtual visits will not address the medical issues sufficiently)  Referred to vascular.  Total time spent 40 minutes including time needed to review the records, the   patient evaluation, documentation, face-to-face discussion with the patient,   more than 50% of the time was spent on coordination of care and counseling.    Level V visit.    Angela Hanson MD

## 2020-04-10 DIAGNOSIS — N25.81 SECONDARY HYPERPARATHYROIDISM OF RENAL ORIGIN: ICD-10-CM

## 2020-04-12 ENCOUNTER — NURSE TRIAGE (OUTPATIENT)
Dept: ADMINISTRATIVE | Facility: CLINIC | Age: 75
End: 2020-04-12

## 2020-04-12 NOTE — TELEPHONE ENCOUNTER
"C/o cramps to BLE x "couple of days ago." Intermittent, currently 3/10. At its worst "past a ten." Has had this issue for about a year and has flared up in the last few days. Denies fever/injury. Offered [email or web address] and coupon code: [email or web address] and choose [facility] provider. OOC back if pain worsens. Message to pcp.     Reason for Disposition   [1] SEVERE pain (e.g., excruciating, unable to do any normal activities) AND [2] not improved after 2 hours of pain medicine    Additional Information   Negative: Looks like a broken bone or dislocated joint (e.g., crooked or deformed)   Negative: Sounds like a life-threatening emergency to the triager   Negative: Followed a leg injury   Negative: Leg swelling is main symptom   Negative: Back pain radiating (shooting) into leg(s)   Negative: Knee pain is main symptom   Negative: Ankle pain is main symptom   Negative: Pregnant   Negative: Chest pain   Negative: Difficulty breathing   Negative: Entire foot is cool or blue in comparison to other side   Negative: Unable to walk   Negative: [1] Red area or streak AND [2] fever   Negative: [1] Swollen joint AND [2] fever   Negative: [1] Cast on leg or ankle AND [2] now increased pain   Negative: Patient sounds very sick or weak to the triager    Protocols used: LEG PAIN-A-AH      "

## 2020-04-13 NOTE — TELEPHONE ENCOUNTER
I haven't seen her in over a year.  Needs a visit with someone- urgent care or virtual visit with a provider.  SM

## 2020-04-14 RX ORDER — CALCITRIOL 0.25 UG/1
CAPSULE ORAL
Qty: 30 CAPSULE | Refills: 9 | Status: SHIPPED | OUTPATIENT
Start: 2020-04-14 | End: 2021-12-06

## 2020-05-04 ENCOUNTER — LAB VISIT (OUTPATIENT)
Dept: LAB | Facility: HOSPITAL | Age: 75
End: 2020-05-04
Attending: INTERNAL MEDICINE
Payer: MEDICARE

## 2020-05-04 DIAGNOSIS — E11.29 PROTEINURIA DUE TO TYPE 2 DIABETES MELLITUS: ICD-10-CM

## 2020-05-04 DIAGNOSIS — N18.4 CHRONIC KIDNEY DISEASE, STAGE IV (SEVERE): ICD-10-CM

## 2020-05-04 DIAGNOSIS — E11.21 NEPHROTIC SYNDROME DUE TO DIABETES MELLITUS: ICD-10-CM

## 2020-05-04 DIAGNOSIS — I10 ESSENTIAL HYPERTENSION: ICD-10-CM

## 2020-05-04 DIAGNOSIS — R80.9 PROTEINURIA DUE TO TYPE 2 DIABETES MELLITUS: ICD-10-CM

## 2020-05-04 DIAGNOSIS — E55.9 VITAMIN D DEFICIENCY: ICD-10-CM

## 2020-05-04 DIAGNOSIS — N25.81 SECONDARY HYPERPARATHYROIDISM: ICD-10-CM

## 2020-05-04 DIAGNOSIS — E11.21 DIABETIC NEPHROPATHY ASSOCIATED WITH TYPE 2 DIABETES MELLITUS: ICD-10-CM

## 2020-05-04 DIAGNOSIS — D63.1 ANEMIA OF CHRONIC RENAL FAILURE, STAGE 4 (SEVERE): ICD-10-CM

## 2020-05-04 DIAGNOSIS — E87.20 METABOLIC ACIDEMIA: ICD-10-CM

## 2020-05-04 DIAGNOSIS — N18.4 ANEMIA OF CHRONIC RENAL FAILURE, STAGE 4 (SEVERE): ICD-10-CM

## 2020-05-04 LAB
ALBUMIN SERPL BCP-MCNC: 3.4 G/DL (ref 3.5–5.2)
ANION GAP SERPL CALC-SCNC: 14 MMOL/L (ref 8–16)
BASOPHILS # BLD AUTO: 0.01 K/UL (ref 0–0.2)
BASOPHILS NFR BLD: 0.2 % (ref 0–1.9)
BUN SERPL-MCNC: 87 MG/DL (ref 8–23)
CALCIUM SERPL-MCNC: 8.6 MG/DL (ref 8.7–10.5)
CHLORIDE SERPL-SCNC: 105 MMOL/L (ref 95–110)
CO2 SERPL-SCNC: 22 MMOL/L (ref 23–29)
CREAT SERPL-MCNC: 5.3 MG/DL (ref 0.5–1.4)
DIFFERENTIAL METHOD: ABNORMAL
EOSINOPHIL # BLD AUTO: 0 K/UL (ref 0–0.5)
EOSINOPHIL NFR BLD: 0.7 % (ref 0–8)
ERYTHROCYTE [DISTWIDTH] IN BLOOD BY AUTOMATED COUNT: 12.9 % (ref 11.5–14.5)
EST. GFR  (AFRICAN AMERICAN): 8.6 ML/MIN/1.73 M^2
EST. GFR  (NON AFRICAN AMERICAN): 7.5 ML/MIN/1.73 M^2
GLUCOSE SERPL-MCNC: 291 MG/DL (ref 70–110)
HCT VFR BLD AUTO: 24.3 % (ref 37–48.5)
HGB BLD-MCNC: 7.5 G/DL (ref 12–16)
IMM GRANULOCYTES # BLD AUTO: 0.01 K/UL (ref 0–0.04)
IMM GRANULOCYTES NFR BLD AUTO: 0.2 % (ref 0–0.5)
LYMPHOCYTES # BLD AUTO: 0.9 K/UL (ref 1–4.8)
LYMPHOCYTES NFR BLD: 15.9 % (ref 18–48)
MCH RBC QN AUTO: 30.4 PG (ref 27–31)
MCHC RBC AUTO-ENTMCNC: 30.9 G/DL (ref 32–36)
MCV RBC AUTO: 98 FL (ref 82–98)
MONOCYTES # BLD AUTO: 0.3 K/UL (ref 0.3–1)
MONOCYTES NFR BLD: 5.7 % (ref 4–15)
NEUTROPHILS # BLD AUTO: 4.5 K/UL (ref 1.8–7.7)
NEUTROPHILS NFR BLD: 77.3 % (ref 38–73)
NRBC BLD-RTO: 0 /100 WBC
PHOSPHATE SERPL-MCNC: 4.3 MG/DL (ref 2.7–4.5)
PLATELET # BLD AUTO: 186 K/UL (ref 150–350)
PMV BLD AUTO: 10.5 FL (ref 9.2–12.9)
POTASSIUM SERPL-SCNC: 4 MMOL/L (ref 3.5–5.1)
RBC # BLD AUTO: 2.47 M/UL (ref 4–5.4)
SODIUM SERPL-SCNC: 141 MMOL/L (ref 136–145)
WBC # BLD AUTO: 5.77 K/UL (ref 3.9–12.7)

## 2020-05-04 PROCEDURE — 85025 COMPLETE CBC W/AUTO DIFF WBC: CPT | Mod: HCNC,TXP

## 2020-05-04 PROCEDURE — 36415 COLL VENOUS BLD VENIPUNCTURE: CPT | Mod: HCNC,TXP

## 2020-05-04 PROCEDURE — 80069 RENAL FUNCTION PANEL: CPT | Mod: HCNC,NTX

## 2020-05-04 PROCEDURE — 82610 CYSTATIN C: CPT | Mod: HCNC,TXP

## 2020-05-06 LAB
CYSTATIN C SERPL-MCNC: 3.57 MG/L (ref 0.68–1.36)
GFR/BSA.PRED SERPLBLD CYS-BASED-ARV: 13 ML/MIN/BSA

## 2020-05-07 ENCOUNTER — PATIENT OUTREACH (OUTPATIENT)
Dept: ADMINISTRATIVE | Facility: OTHER | Age: 75
End: 2020-05-07

## 2020-05-07 NOTE — PROGRESS NOTES
Patient's chart was reviewed.   Requested updates within Care Everywhere.  Immunizations reconciled.    Health Maintenance was updated.

## 2020-05-11 ENCOUNTER — TELEPHONE (OUTPATIENT)
Dept: HEMATOLOGY/ONCOLOGY | Facility: CLINIC | Age: 75
End: 2020-05-11

## 2020-05-11 ENCOUNTER — LAB VISIT (OUTPATIENT)
Dept: LAB | Facility: HOSPITAL | Age: 75
End: 2020-05-11
Attending: NURSE PRACTITIONER
Payer: MEDICARE

## 2020-05-11 DIAGNOSIS — N18.5 ANEMIA DUE TO STAGE 5 CHRONIC KIDNEY DISEASE: ICD-10-CM

## 2020-05-11 DIAGNOSIS — D63.1 ANEMIA IN STAGE 4 CHRONIC KIDNEY DISEASE: ICD-10-CM

## 2020-05-11 DIAGNOSIS — N18.4 ANEMIA IN STAGE 4 CHRONIC KIDNEY DISEASE: ICD-10-CM

## 2020-05-11 DIAGNOSIS — N18.5 CKD (CHRONIC KIDNEY DISEASE) STAGE 5, GFR LESS THAN 15 ML/MIN: ICD-10-CM

## 2020-05-11 DIAGNOSIS — N18.5 ANEMIA OF CHRONIC RENAL FAILURE, STAGE 5: ICD-10-CM

## 2020-05-11 DIAGNOSIS — D63.1 ANEMIA DUE TO STAGE 5 CHRONIC KIDNEY DISEASE: Primary | ICD-10-CM

## 2020-05-11 DIAGNOSIS — D63.1 ANEMIA OF CHRONIC RENAL FAILURE, STAGE 5: ICD-10-CM

## 2020-05-11 DIAGNOSIS — D63.1 ANEMIA DUE TO STAGE 5 CHRONIC KIDNEY DISEASE: ICD-10-CM

## 2020-05-11 DIAGNOSIS — N18.5 ANEMIA DUE TO STAGE 5 CHRONIC KIDNEY DISEASE: Primary | ICD-10-CM

## 2020-05-11 LAB
ALBUMIN SERPL BCP-MCNC: 3.9 G/DL (ref 3.5–5.2)
ALBUMIN SERPL BCP-MCNC: 3.9 G/DL (ref 3.5–5.2)
ALP SERPL-CCNC: 62 U/L (ref 55–135)
ALT SERPL W/O P-5'-P-CCNC: 24 U/L (ref 10–44)
ANION GAP SERPL CALC-SCNC: 12 MMOL/L (ref 8–16)
ANION GAP SERPL CALC-SCNC: 12 MMOL/L (ref 8–16)
AST SERPL-CCNC: 14 U/L (ref 10–40)
BASOPHILS # BLD AUTO: 0.01 K/UL (ref 0–0.2)
BASOPHILS # BLD AUTO: 0.01 K/UL (ref 0–0.2)
BASOPHILS NFR BLD: 0.3 % (ref 0–1.9)
BASOPHILS NFR BLD: 0.3 % (ref 0–1.9)
BILIRUB SERPL-MCNC: 0.4 MG/DL (ref 0.1–1)
BUN SERPL-MCNC: 85 MG/DL (ref 8–23)
BUN SERPL-MCNC: 85 MG/DL (ref 8–23)
CALCIUM SERPL-MCNC: 9 MG/DL (ref 8.7–10.5)
CALCIUM SERPL-MCNC: 9 MG/DL (ref 8.7–10.5)
CHLORIDE SERPL-SCNC: 105 MMOL/L (ref 95–110)
CHLORIDE SERPL-SCNC: 105 MMOL/L (ref 95–110)
CO2 SERPL-SCNC: 24 MMOL/L (ref 23–29)
CO2 SERPL-SCNC: 24 MMOL/L (ref 23–29)
CREAT SERPL-MCNC: 5.9 MG/DL (ref 0.5–1.4)
CREAT SERPL-MCNC: 5.9 MG/DL (ref 0.5–1.4)
DAT IGG-SP REAG RBC-IMP: NORMAL
DIFFERENTIAL METHOD: ABNORMAL
DIFFERENTIAL METHOD: ABNORMAL
EOSINOPHIL # BLD AUTO: 0.1 K/UL (ref 0–0.5)
EOSINOPHIL # BLD AUTO: 0.1 K/UL (ref 0–0.5)
EOSINOPHIL NFR BLD: 1.7 % (ref 0–8)
EOSINOPHIL NFR BLD: 1.7 % (ref 0–8)
ERYTHROCYTE [DISTWIDTH] IN BLOOD BY AUTOMATED COUNT: 13.1 % (ref 11.5–14.5)
ERYTHROCYTE [DISTWIDTH] IN BLOOD BY AUTOMATED COUNT: 13.1 % (ref 11.5–14.5)
EST. GFR  (AFRICAN AMERICAN): 8 ML/MIN/1.73 M^2
EST. GFR  (AFRICAN AMERICAN): 8 ML/MIN/1.73 M^2
EST. GFR  (NON AFRICAN AMERICAN): 7 ML/MIN/1.73 M^2
EST. GFR  (NON AFRICAN AMERICAN): 7 ML/MIN/1.73 M^2
GLUCOSE SERPL-MCNC: 211 MG/DL (ref 70–110)
GLUCOSE SERPL-MCNC: 211 MG/DL (ref 70–110)
HCT VFR BLD AUTO: 24.6 % (ref 37–48.5)
HCT VFR BLD AUTO: 24.6 % (ref 37–48.5)
HGB BLD-MCNC: 7.9 G/DL (ref 12–16)
HGB BLD-MCNC: 7.9 G/DL (ref 12–16)
IMM GRANULOCYTES # BLD AUTO: 0 K/UL (ref 0–0.04)
IMM GRANULOCYTES # BLD AUTO: 0 K/UL (ref 0–0.04)
IMM GRANULOCYTES NFR BLD AUTO: 0 % (ref 0–0.5)
IMM GRANULOCYTES NFR BLD AUTO: 0 % (ref 0–0.5)
LDH SERPL L TO P-CCNC: 220 U/L (ref 110–260)
LYMPHOCYTES # BLD AUTO: 0.8 K/UL (ref 1–4.8)
LYMPHOCYTES # BLD AUTO: 0.8 K/UL (ref 1–4.8)
LYMPHOCYTES NFR BLD: 23.6 % (ref 18–48)
LYMPHOCYTES NFR BLD: 23.6 % (ref 18–48)
MCH RBC QN AUTO: 30.5 PG (ref 27–31)
MCH RBC QN AUTO: 30.5 PG (ref 27–31)
MCHC RBC AUTO-ENTMCNC: 32.1 G/DL (ref 32–36)
MCHC RBC AUTO-ENTMCNC: 32.1 G/DL (ref 32–36)
MCV RBC AUTO: 95 FL (ref 82–98)
MCV RBC AUTO: 95 FL (ref 82–98)
MONOCYTES # BLD AUTO: 0.3 K/UL (ref 0.3–1)
MONOCYTES # BLD AUTO: 0.3 K/UL (ref 0.3–1)
MONOCYTES NFR BLD: 8.7 % (ref 4–15)
MONOCYTES NFR BLD: 8.7 % (ref 4–15)
NEUTROPHILS # BLD AUTO: 2.3 K/UL (ref 1.8–7.7)
NEUTROPHILS # BLD AUTO: 2.3 K/UL (ref 1.8–7.7)
NEUTROPHILS NFR BLD: 65.7 % (ref 38–73)
NEUTROPHILS NFR BLD: 65.7 % (ref 38–73)
NRBC BLD-RTO: 0 /100 WBC
NRBC BLD-RTO: 0 /100 WBC
PHOSPHATE SERPL-MCNC: 4.7 MG/DL (ref 2.7–4.5)
PLATELET # BLD AUTO: 173 K/UL (ref 150–350)
PLATELET # BLD AUTO: 173 K/UL (ref 150–350)
PMV BLD AUTO: 8.9 FL (ref 9.2–12.9)
PMV BLD AUTO: 8.9 FL (ref 9.2–12.9)
POTASSIUM SERPL-SCNC: 4.2 MMOL/L (ref 3.5–5.1)
POTASSIUM SERPL-SCNC: 4.2 MMOL/L (ref 3.5–5.1)
PROT SERPL-MCNC: 6.5 G/DL (ref 6–8.4)
RBC # BLD AUTO: 2.59 M/UL (ref 4–5.4)
RBC # BLD AUTO: 2.59 M/UL (ref 4–5.4)
RETICS/RBC NFR AUTO: 2.7 % (ref 0.5–2.5)
SODIUM SERPL-SCNC: 141 MMOL/L (ref 136–145)
SODIUM SERPL-SCNC: 141 MMOL/L (ref 136–145)
WBC # BLD AUTO: 3.56 K/UL (ref 3.9–12.7)
WBC # BLD AUTO: 3.56 K/UL (ref 3.9–12.7)

## 2020-05-11 PROCEDURE — 80053 COMPREHEN METABOLIC PANEL: CPT | Mod: HCNC,TXP

## 2020-05-11 PROCEDURE — 36415 COLL VENOUS BLD VENIPUNCTURE: CPT | Mod: HCNC,NTX

## 2020-05-11 PROCEDURE — 84165 PROTEIN E-PHORESIS SERUM: CPT | Mod: 26,HCNC,NTX, | Performed by: PATHOLOGY

## 2020-05-11 PROCEDURE — 85025 COMPLETE CBC W/AUTO DIFF WBC: CPT | Mod: HCNC,NTX

## 2020-05-11 PROCEDURE — 85060 PATHOLOGIST REVIEW: ICD-10-PCS | Mod: HCNC,NTX,, | Performed by: PATHOLOGY

## 2020-05-11 PROCEDURE — 85060 BLOOD SMEAR INTERPRETATION: CPT | Mod: HCNC,NTX,, | Performed by: PATHOLOGY

## 2020-05-11 PROCEDURE — 86880 COOMBS TEST DIRECT: CPT | Mod: HCNC,TXP

## 2020-05-11 PROCEDURE — 83520 IMMUNOASSAY QUANT NOS NONAB: CPT | Mod: HCNC,NTX

## 2020-05-11 PROCEDURE — 82728 ASSAY OF FERRITIN: CPT | Mod: HCNC,NTX

## 2020-05-11 PROCEDURE — 83615 LACTATE (LD) (LDH) ENZYME: CPT | Mod: HCNC,NTX

## 2020-05-11 PROCEDURE — 83010 ASSAY OF HAPTOGLOBIN QUANT: CPT | Mod: HCNC,NTX

## 2020-05-11 PROCEDURE — 80069 RENAL FUNCTION PANEL: CPT | Mod: HCNC,TXP

## 2020-05-11 PROCEDURE — 84165 PROTEIN E-PHORESIS SERUM: CPT | Mod: HCNC,TXP

## 2020-05-11 PROCEDURE — 83540 ASSAY OF IRON: CPT | Mod: HCNC,TXP

## 2020-05-11 PROCEDURE — 84165 PATHOLOGIST INTERPRETATION SPE: ICD-10-PCS | Mod: 26,HCNC,NTX, | Performed by: PATHOLOGY

## 2020-05-11 PROCEDURE — 85045 AUTOMATED RETICULOCYTE COUNT: CPT | Mod: HCNC,TXP

## 2020-05-11 PROCEDURE — 82784 ASSAY IGA/IGD/IGG/IGM EACH: CPT | Mod: 59,HCNC,TXP

## 2020-05-11 NOTE — TELEPHONE ENCOUNTER
----- Message from Abby Lieberman sent at 5/11/2020  7:46 AM CDT -----  Contact: pt  Pt couldn't make Friday's lab appt and needs to reschedule for today. appt desk could not move appt.

## 2020-05-12 LAB
ALBUMIN SERPL ELPH-MCNC: 4.06 G/DL (ref 3.35–5.55)
ALPHA1 GLOB SERPL ELPH-MCNC: 0.37 G/DL (ref 0.17–0.41)
ALPHA2 GLOB SERPL ELPH-MCNC: 0.68 G/DL (ref 0.43–0.99)
B-GLOBULIN SERPL ELPH-MCNC: 0.67 G/DL (ref 0.5–1.1)
FERRITIN SERPL-MCNC: 605 NG/ML (ref 20–300)
GAMMA GLOB SERPL ELPH-MCNC: 0.61 G/DL (ref 0.67–1.58)
HAPTOGLOB SERPL-MCNC: 81 MG/DL (ref 30–250)
IGA SERPL-MCNC: 238 MG/DL (ref 40–350)
IGG SERPL-MCNC: 626 MG/DL (ref 650–1600)
IGM SERPL-MCNC: 64 MG/DL (ref 50–300)
IRON SERPL-MCNC: 56 UG/DL (ref 30–160)
KAPPA LC SER QL IA: 13.5 MG/DL (ref 0.33–1.94)
KAPPA LC/LAMBDA SER IA: 3.47 (ref 0.26–1.65)
LAMBDA LC SER QL IA: 3.89 MG/DL (ref 0.57–2.63)
PATHOLOGIST INTERPRETATION SPE: NORMAL
PROT SERPL-MCNC: 6.4 G/DL (ref 6–8.4)
SATURATED IRON: 20 % (ref 20–50)
TOTAL IRON BINDING CAPACITY: 275 UG/DL (ref 250–450)
TRANSFERRIN SERPL-MCNC: 186 MG/DL (ref 200–375)

## 2020-05-13 LAB
PATH REV BLD -IMP: NORMAL
PATH REV BLD -IMP: NORMAL

## 2020-05-15 ENCOUNTER — INFUSION (OUTPATIENT)
Dept: INFUSION THERAPY | Facility: HOSPITAL | Age: 75
End: 2020-05-15
Attending: INTERNAL MEDICINE
Payer: MEDICARE

## 2020-05-15 ENCOUNTER — OFFICE VISIT (OUTPATIENT)
Dept: HEMATOLOGY/ONCOLOGY | Facility: CLINIC | Age: 75
End: 2020-05-15
Payer: MEDICARE

## 2020-05-15 ENCOUNTER — TELEPHONE (OUTPATIENT)
Dept: HEMATOLOGY/ONCOLOGY | Facility: CLINIC | Age: 75
End: 2020-05-15

## 2020-05-15 VITALS
WEIGHT: 136.88 LBS | SYSTOLIC BLOOD PRESSURE: 179 MMHG | HEART RATE: 65 BPM | HEIGHT: 65 IN | BODY MASS INDEX: 22.81 KG/M2 | DIASTOLIC BLOOD PRESSURE: 67 MMHG

## 2020-05-15 VITALS
RESPIRATION RATE: 16 BRPM | TEMPERATURE: 98 F | DIASTOLIC BLOOD PRESSURE: 64 MMHG | HEART RATE: 61 BPM | SYSTOLIC BLOOD PRESSURE: 179 MMHG | OXYGEN SATURATION: 97 %

## 2020-05-15 DIAGNOSIS — E11.59 HYPERTENSION ASSOCIATED WITH DIABETES: Chronic | ICD-10-CM

## 2020-05-15 DIAGNOSIS — D63.1 ANEMIA IN STAGE 4 CHRONIC KIDNEY DISEASE: ICD-10-CM

## 2020-05-15 DIAGNOSIS — I15.2 HYPERTENSION ASSOCIATED WITH DIABETES: Chronic | ICD-10-CM

## 2020-05-15 DIAGNOSIS — Z79.4 TYPE 2 DIABETES MELLITUS WITH STABLE PROLIFERATIVE RETINOPATHY OF BOTH EYES, WITH LONG-TERM CURRENT USE OF INSULIN: ICD-10-CM

## 2020-05-15 DIAGNOSIS — N18.5 ANEMIA DUE TO STAGE 5 CHRONIC KIDNEY DISEASE: Primary | ICD-10-CM

## 2020-05-15 DIAGNOSIS — N18.5 STAGE 5 CHRONIC KIDNEY DISEASE: ICD-10-CM

## 2020-05-15 DIAGNOSIS — D63.1 ANEMIA DUE TO STAGE 5 CHRONIC KIDNEY DISEASE: Primary | ICD-10-CM

## 2020-05-15 DIAGNOSIS — N18.4 CHRONIC KIDNEY DISEASE (CKD) STAGE G4/A3, SEVERELY DECREASED GLOMERULAR FILTRATION RATE (GFR) BETWEEN 15-29 ML/MIN/1.73 SQUARE METER AND ALBUMINURIA CREATININE RATIO GREATER THAN 300 MG/G: ICD-10-CM

## 2020-05-15 DIAGNOSIS — N18.4 ANEMIA IN STAGE 4 CHRONIC KIDNEY DISEASE: ICD-10-CM

## 2020-05-15 DIAGNOSIS — D50.0 IRON DEFICIENCY ANEMIA DUE TO CHRONIC BLOOD LOSS: ICD-10-CM

## 2020-05-15 DIAGNOSIS — E11.3553 TYPE 2 DIABETES MELLITUS WITH STABLE PROLIFERATIVE RETINOPATHY OF BOTH EYES, WITH LONG-TERM CURRENT USE OF INSULIN: ICD-10-CM

## 2020-05-15 PROCEDURE — 1126F AMNT PAIN NOTED NONE PRSNT: CPT | Mod: HCNC,S$GLB,, | Performed by: INTERNAL MEDICINE

## 2020-05-15 PROCEDURE — 2024F 7 FLD RTA PHOTO EVC RTNOPTHY: CPT | Mod: HCNC,S$GLB,, | Performed by: INTERNAL MEDICINE

## 2020-05-15 PROCEDURE — 3046F PR MOST RECENT HEMOGLOBIN A1C LEVEL > 9.0%: ICD-10-PCS | Mod: HCNC,CPTII,S$GLB, | Performed by: INTERNAL MEDICINE

## 2020-05-15 PROCEDURE — 1101F PT FALLS ASSESS-DOCD LE1/YR: CPT | Mod: HCNC,CPTII,S$GLB, | Performed by: INTERNAL MEDICINE

## 2020-05-15 PROCEDURE — 3078F DIAST BP <80 MM HG: CPT | Mod: HCNC,CPTII,S$GLB, | Performed by: INTERNAL MEDICINE

## 2020-05-15 PROCEDURE — 3077F SYST BP >= 140 MM HG: CPT | Mod: HCNC,CPTII,S$GLB, | Performed by: INTERNAL MEDICINE

## 2020-05-15 PROCEDURE — 63600175 PHARM REV CODE 636 W HCPCS: Mod: HCNC | Performed by: INTERNAL MEDICINE

## 2020-05-15 PROCEDURE — 96372 THER/PROPH/DIAG INJ SC/IM: CPT | Mod: HCNC

## 2020-05-15 PROCEDURE — 1159F MED LIST DOCD IN RCRD: CPT | Mod: HCNC,S$GLB,, | Performed by: INTERNAL MEDICINE

## 2020-05-15 PROCEDURE — 1101F PR PT FALLS ASSESS DOC 0-1 FALLS W/OUT INJ PAST YR: ICD-10-PCS | Mod: HCNC,CPTII,S$GLB, | Performed by: INTERNAL MEDICINE

## 2020-05-15 PROCEDURE — 1159F PR MEDICATION LIST DOCUMENTED IN MEDICAL RECORD: ICD-10-PCS | Mod: HCNC,S$GLB,, | Performed by: INTERNAL MEDICINE

## 2020-05-15 PROCEDURE — 3078F PR MOST RECENT DIASTOLIC BLOOD PRESSURE < 80 MM HG: ICD-10-PCS | Mod: HCNC,CPTII,S$GLB, | Performed by: INTERNAL MEDICINE

## 2020-05-15 PROCEDURE — 99215 PR OFFICE/OUTPT VISIT, EST, LEVL V, 40-54 MIN: ICD-10-PCS | Mod: HCNC,S$GLB,, | Performed by: INTERNAL MEDICINE

## 2020-05-15 PROCEDURE — 99499 RISK ADDL DX/OHS AUDIT: ICD-10-PCS | Mod: HCNC,S$GLB,, | Performed by: INTERNAL MEDICINE

## 2020-05-15 PROCEDURE — 3046F HEMOGLOBIN A1C LEVEL >9.0%: CPT | Mod: HCNC,CPTII,S$GLB, | Performed by: INTERNAL MEDICINE

## 2020-05-15 PROCEDURE — 99215 OFFICE O/P EST HI 40 MIN: CPT | Mod: HCNC,S$GLB,, | Performed by: INTERNAL MEDICINE

## 2020-05-15 PROCEDURE — 99499 UNLISTED E&M SERVICE: CPT | Mod: HCNC,S$GLB,, | Performed by: INTERNAL MEDICINE

## 2020-05-15 PROCEDURE — 99999 PR PBB SHADOW E&M-EST. PATIENT-LVL III: ICD-10-PCS | Mod: PBBFAC,HCNC,, | Performed by: INTERNAL MEDICINE

## 2020-05-15 PROCEDURE — 99999 PR PBB SHADOW E&M-EST. PATIENT-LVL III: CPT | Mod: PBBFAC,HCNC,, | Performed by: INTERNAL MEDICINE

## 2020-05-15 PROCEDURE — 3077F PR MOST RECENT SYSTOLIC BLOOD PRESSURE >= 140 MM HG: ICD-10-PCS | Mod: HCNC,CPTII,S$GLB, | Performed by: INTERNAL MEDICINE

## 2020-05-15 PROCEDURE — 1126F PR PAIN SEVERITY QUANTIFIED, NO PAIN PRESENT: ICD-10-PCS | Mod: HCNC,S$GLB,, | Performed by: INTERNAL MEDICINE

## 2020-05-15 PROCEDURE — 2024F PR 7 FIELD PHOTOS WITH INTERP/ REVIEW: ICD-10-PCS | Mod: HCNC,S$GLB,, | Performed by: INTERNAL MEDICINE

## 2020-05-15 RX ORDER — HYDRALAZINE HYDROCHLORIDE AND ISOSORBIDE DINITRATE 37.5; 2 MG/1; MG/1
TABLET, FILM COATED ORAL
Qty: 180 TABLET | Refills: 11 | Status: SHIPPED | OUTPATIENT
Start: 2020-05-15 | End: 2020-08-23

## 2020-05-15 RX ADMIN — EPOETIN ALFA-EPBX 20000 UNITS: 10000 INJECTION, SOLUTION INTRAVENOUS; SUBCUTANEOUS at 03:05

## 2020-05-15 NOTE — DISCHARGE INSTRUCTIONS
[facility]  [address] [address]  [address]  [phone] phone     [phone] fax  Hours of Operation: Monday- Friday 8:00am- 5:00pm  After hours phone  [phone]  Hematology / Oncology Physicians on call      JESSENIA Mendosa Dr., Dr., Dr., Dr., NP Sydney Prescott, NP Tyesha Taylor, NP    Please call with any concerns regarding your appointment today.

## 2020-05-17 NOTE — PROGRESS NOTES
She had home    Subjective:      DATE OF VISIT: 5/17/2020   ?   ?   Patient ID:?Avril Monzon is a 75 y.o. female.?? MR#: [id number]   ?   PRIMARY PROVIDER: Dr. De La Cruz    CHIEF COMPLAINT:  Follow-up  ?   DIAGNOSIS:  Anemia of chronic kidney disease?????   ?   CURRENT TREATMENT:  DIANE, 90687 units every other week for hemoglobin less than 10      Anemia         I had the pleasure following up with Ms. Monzon who returns for follow-up.  She was previously seen initially by Dr. Fletcher and at last visit by Dr. Oseguera.  She has been followed for anemia of chronic kidney disease in the setting of type 2 diabetes and hypertension.  She has been receiving 32684 units of Procrit as needed for hemoglobin less than 10 but has rarely required.  She comes for more urgent visit due to notable worsening in hemoglobin.  She continues to follow with Nephrology due to progressive renal dysfunction planning for initiation of dialysis.  She denies any evidence of bleeding.          Review of Systems    ?   A comprehensive 14-point review of systems was reviewed with patient and was negative other than as specified above.   ?     Objective:      Physical Exam      ?   Vitals:    05/15/20 1321   BP: (!) 179/67   Pulse: 65      ?   ECOG:?.0    General appearance: Generally well appearing, in no acute distress.   Head, eyes, ears, nose, and throat: Oropharynx clear with moist mucous membranes.   Cardiovascular: Regular rate and rhythm, S1, S2, no audible murmurs.   Respiratory: Lungs clear to auscultation bilaterally.   Abdomen: nontender, nondistended.   Extremities: Warm, without edema.   Neurologic: Alert and oriented. Grossly normal strength, coordination, and gait.   Skin: No rashes, ecchymoses or petechial lesion.   Psychiatric: normal mood and affect, conversant and appropriate    ?   Laboratory:  ?   No visits with results within 1 Day(s) from this visit.   Latest known visit with results is:   Lab Visit on 05/11/2020   Component  Date Value Ref Range Status    WBC 05/11/2020 3.56* 3.90 - 12.70 K/uL Final    RBC 05/11/2020 2.59* 4.00 - 5.40 M/uL Final    Hemoglobin 05/11/2020 7.9* 12.0 - 16.0 g/dL Final    Hematocrit 05/11/2020 24.6* 37.0 - 48.5 % Final    Mean Corpuscular Volume 05/11/2020 95  82 - 98 fL Final    Mean Corpuscular Hemoglobin 05/11/2020 30.5  27.0 - 31.0 pg Final    Mean Corpuscular Hemoglobin Conc 05/11/2020 32.1  32.0 - 36.0 g/dL Final    RDW 05/11/2020 13.1  11.5 - 14.5 % Final    Platelets 05/11/2020 173  150 - 350 K/uL Final    MPV 05/11/2020 8.9* 9.2 - 12.9 fL Final    Immature Granulocytes 05/11/2020 0.0  0.0 - 0.5 % Final    Gran # (ANC) 05/11/2020 2.3  1.8 - 7.7 K/uL Final    Immature Grans (Abs) 05/11/2020 0.00  0.00 - 0.04 K/uL Final    Lymph # 05/11/2020 0.8* 1.0 - 4.8 K/uL Final    Mono # 05/11/2020 0.3  0.3 - 1.0 K/uL Final    Eos # 05/11/2020 0.1  0.0 - 0.5 K/uL Final    Baso # 05/11/2020 0.01  0.00 - 0.20 K/uL Final    nRBC 05/11/2020 0  0 /100 WBC Final    Gran% 05/11/2020 65.7  38.0 - 73.0 % Final    Lymph% 05/11/2020 23.6  18.0 - 48.0 % Final    Mono% 05/11/2020 8.7  4.0 - 15.0 % Final    Eosinophil% 05/11/2020 1.7  0.0 - 8.0 % Final    Basophil% 05/11/2020 0.3  0.0 - 1.9 % Final    Differential Method 05/11/2020 Automated   Final    Sodium 05/11/2020 141  136 - 145 mmol/L Final    Potassium 05/11/2020 4.2  3.5 - 5.1 mmol/L Final    Chloride 05/11/2020 105  95 - 110 mmol/L Final    CO2 05/11/2020 24  23 - 29 mmol/L Final    Glucose 05/11/2020 211* 70 - 110 mg/dL Final    BUN, Bld 05/11/2020 85* 8 - 23 mg/dL Final    Creatinine 05/11/2020 5.9* 0.5 - 1.4 mg/dL Final    Calcium 05/11/2020 9.0  8.7 - 10.5 mg/dL Final    Total Protein 05/11/2020 6.5  6.0 - 8.4 g/dL Final    Albumin 05/11/2020 3.9  3.5 - 5.2 g/dL Final    Total Bilirubin 05/11/2020 0.4  0.1 - 1.0 mg/dL Final    Alkaline Phosphatase 05/11/2020 62  55 - 135 U/L Final    AST 05/11/2020 14  10 - 40 U/L Final     ALT 05/11/2020 24  10 - 44 U/L Final    Anion Gap 05/11/2020 12  8 - 16 mmol/L Final    eGFR if African American 05/11/2020 8* >60 mL/min/1.73 m^2 Final    eGFR if non African American 05/11/2020 7* >60 mL/min/1.73 m^2 Final    Glucose 05/11/2020 211* 70 - 110 mg/dL Final    Sodium 05/11/2020 141  136 - 145 mmol/L Final    Potassium 05/11/2020 4.2  3.5 - 5.1 mmol/L Final    Chloride 05/11/2020 105  95 - 110 mmol/L Final    CO2 05/11/2020 24  23 - 29 mmol/L Final    BUN, Bld 05/11/2020 85* 8 - 23 mg/dL Final    Calcium 05/11/2020 9.0  8.7 - 10.5 mg/dL Final    Creatinine 05/11/2020 5.9* 0.5 - 1.4 mg/dL Final    Albumin 05/11/2020 3.9  3.5 - 5.2 g/dL Final    Phosphorus 05/11/2020 4.7* 2.7 - 4.5 mg/dL Final    eGFR if African American 05/11/2020 8* >60 mL/min/1.73 m^2 Final    eGFR if non African American 05/11/2020 7* >60 mL/min/1.73 m^2 Final    Anion Gap 05/11/2020 12  8 - 16 mmol/L Final    WBC 05/11/2020 3.56* 3.90 - 12.70 K/uL Final    RBC 05/11/2020 2.59* 4.00 - 5.40 M/uL Final    Hemoglobin 05/11/2020 7.9* 12.0 - 16.0 g/dL Final    Hematocrit 05/11/2020 24.6* 37.0 - 48.5 % Final    Mean Corpuscular Volume 05/11/2020 95  82 - 98 fL Final    Mean Corpuscular Hemoglobin 05/11/2020 30.5  27.0 - 31.0 pg Final    Mean Corpuscular Hemoglobin Conc 05/11/2020 32.1  32.0 - 36.0 g/dL Final    RDW 05/11/2020 13.1  11.5 - 14.5 % Final    Platelets 05/11/2020 173  150 - 350 K/uL Final    MPV 05/11/2020 8.9* 9.2 - 12.9 fL Final    Immature Granulocytes 05/11/2020 0.0  0.0 - 0.5 % Final    Gran # (ANC) 05/11/2020 2.3  1.8 - 7.7 K/uL Final    Immature Grans (Abs) 05/11/2020 0.00  0.00 - 0.04 K/uL Final    Lymph # 05/11/2020 0.8* 1.0 - 4.8 K/uL Final    Mono # 05/11/2020 0.3  0.3 - 1.0 K/uL Final    Eos # 05/11/2020 0.1  0.0 - 0.5 K/uL Final    Baso # 05/11/2020 0.01  0.00 - 0.20 K/uL Final    nRBC 05/11/2020 0  0 /100 WBC Final    Gran% 05/11/2020 65.7  38.0 - 73.0 % Final    Lymph%  05/11/2020 23.6  18.0 - 48.0 % Final    Mono% 05/11/2020 8.7  4.0 - 15.0 % Final    Eosinophil% 05/11/2020 1.7  0.0 - 8.0 % Final    Basophil% 05/11/2020 0.3  0.0 - 1.9 % Final    Differential Method 05/11/2020 Automated   Final    Ferritin 05/11/2020 605* 20.0 - 300.0 ng/mL Final    Iron 05/11/2020 56  30 - 160 ug/dL Final    Transferrin 05/11/2020 186* 200 - 375 mg/dL Final    TIBC 05/11/2020 275  250 - 450 ug/dL Final    Saturated Iron 05/11/2020 20  20 - 50 % Final    Haptoglobin 05/11/2020 81  30 - 250 mg/dL Final    LD 05/11/2020 220  110 - 260 U/L Final    Retic 05/11/2020 2.7* 0.5 - 2.5 % Final    Direct Amber (BELLO) 05/11/2020 NEG   Final    Protein, Serum 05/11/2020 6.4  6.0 - 8.4 g/dL Final    Albumin grams/dl 05/11/2020 4.06  3.35 - 5.55 g/dL Final    Alpha-1 grams/dl 05/11/2020 0.37  0.17 - 0.41 g/dL Final    Alpha-2 grams/dl 05/11/2020 0.68  0.43 - 0.99 g/dL Final    Beta grams/dl 05/11/2020 0.67  0.50 - 1.10 g/dL Final    Gamma grams/dl 05/11/2020 0.61* 0.67 - 1.58 g/dL Final    Kappa Free Light Chains 05/11/2020 13.50* 0.33 - 1.94 mg/dL Final    Lambda Free Light Chains 05/11/2020 3.89* 0.57 - 2.63 mg/dL Final    Kappa/Lambda FLC Ratio 05/11/2020 3.47* 0.26 - 1.65 Final    IgG - Serum 05/11/2020 626* 650 - 1600 mg/dL Final    IgA 05/11/2020 238  40 - 350 mg/dL Final    IgM 05/11/2020 64  50 - 300 mg/dL Final    Pathologist Interpretation SPE 05/11/2020 REVIEWED   Final    Pathologist Review Peripheral Smear 05/11/2020 REVIEWED   Final    Pathologist Review Peripheral Smear 05/11/2020 REVIEWED   Final      ?   ?   Assessment/Plan:       1. Anemia due to stage 5 chronic kidney disease    2. 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 Hypertension associated with diabetes    4. Stage 5 chronic kidney disease          Plan:     # anemia:  Had received Procrit for hemoglobin less than 10 but rarely  required.  Recently notable worsening of anemia hemoglobin in 7s.  I perform further workup including iron indices without notable deficiency, no evidence of hemolysis, peripheral blood smear without notable abnormalities aside from mild leukopenia and anemia.  No paraprotein.  She does have elevated kappa free light chains  her renal function has declined recently and did discuss differential diagnosis including anemia of CKD but cannot rule out underlying bone marrow etiology.  I have recommended bone marrow biopsy which she is amenable to and will schedule a soon as possible.    # chronic kidney disease:  Progressive renal disease associated with type 2 diabetes and hypertension.  Follow-up with renal/vascular pending initiation of dialysis.    # hypertension:  Continues to be poorly controlled.  Did not take her medications today and reinforced importance of following with PCP.    Follow-Up:   - procrit today  - bone marrow biopsy in hospital  - RV in 2 weeks with labs prior and procrit

## 2020-05-29 ENCOUNTER — OFFICE VISIT (OUTPATIENT)
Dept: HEMATOLOGY/ONCOLOGY | Facility: CLINIC | Age: 75
End: 2020-05-29
Payer: MEDICARE

## 2020-05-29 ENCOUNTER — INFUSION (OUTPATIENT)
Dept: INFUSION THERAPY | Facility: HOSPITAL | Age: 75
End: 2020-05-29
Attending: INTERNAL MEDICINE
Payer: MEDICARE

## 2020-05-29 ENCOUNTER — LAB VISIT (OUTPATIENT)
Dept: LAB | Facility: HOSPITAL | Age: 75
End: 2020-05-29
Attending: INTERNAL MEDICINE
Payer: MEDICARE

## 2020-05-29 VITALS
WEIGHT: 134.25 LBS | RESPIRATION RATE: 16 BRPM | TEMPERATURE: 98 F | HEART RATE: 81 BPM | DIASTOLIC BLOOD PRESSURE: 76 MMHG | DIASTOLIC BLOOD PRESSURE: 67 MMHG | HEART RATE: 63 BPM | SYSTOLIC BLOOD PRESSURE: 152 MMHG | HEIGHT: 65 IN | TEMPERATURE: 98 F | RESPIRATION RATE: 16 BRPM | BODY MASS INDEX: 22.37 KG/M2 | OXYGEN SATURATION: 98 % | SYSTOLIC BLOOD PRESSURE: 121 MMHG

## 2020-05-29 DIAGNOSIS — E11.59 HYPERTENSION ASSOCIATED WITH DIABETES: ICD-10-CM

## 2020-05-29 DIAGNOSIS — N18.5 ANEMIA DUE TO STAGE 5 CHRONIC KIDNEY DISEASE: ICD-10-CM

## 2020-05-29 DIAGNOSIS — N18.4 ANEMIA IN STAGE 4 CHRONIC KIDNEY DISEASE: ICD-10-CM

## 2020-05-29 DIAGNOSIS — N18.4 CHRONIC KIDNEY DISEASE (CKD) STAGE G4/A3, SEVERELY DECREASED GLOMERULAR FILTRATION RATE (GFR) BETWEEN 15-29 ML/MIN/1.73 SQUARE METER AND ALBUMINURIA CREATININE RATIO GREATER THAN 300 MG/G: ICD-10-CM

## 2020-05-29 DIAGNOSIS — D63.1 ANEMIA DUE TO STAGE 5 CHRONIC KIDNEY DISEASE: Primary | ICD-10-CM

## 2020-05-29 DIAGNOSIS — D63.1 ANEMIA IN STAGE 4 CHRONIC KIDNEY DISEASE: ICD-10-CM

## 2020-05-29 DIAGNOSIS — D50.0 IRON DEFICIENCY ANEMIA DUE TO CHRONIC BLOOD LOSS: ICD-10-CM

## 2020-05-29 DIAGNOSIS — N18.5 ANEMIA DUE TO STAGE 5 CHRONIC KIDNEY DISEASE: Primary | ICD-10-CM

## 2020-05-29 DIAGNOSIS — D63.1 ANEMIA DUE TO STAGE 5 CHRONIC KIDNEY DISEASE: ICD-10-CM

## 2020-05-29 DIAGNOSIS — N18.5 STAGE 5 CHRONIC KIDNEY DISEASE: ICD-10-CM

## 2020-05-29 DIAGNOSIS — I15.2 HYPERTENSION ASSOCIATED WITH DIABETES: ICD-10-CM

## 2020-05-29 LAB
ALBUMIN SERPL BCP-MCNC: 3.9 G/DL (ref 3.5–5.2)
ALP SERPL-CCNC: 66 U/L (ref 55–135)
ALT SERPL W/O P-5'-P-CCNC: 14 U/L (ref 10–44)
ANION GAP SERPL CALC-SCNC: 11 MMOL/L (ref 8–16)
AST SERPL-CCNC: 15 U/L (ref 10–40)
BASOPHILS # BLD AUTO: 0.02 K/UL (ref 0–0.2)
BASOPHILS NFR BLD: 0.5 % (ref 0–1.9)
BILIRUB SERPL-MCNC: 0.3 MG/DL (ref 0.1–1)
BUN SERPL-MCNC: 90 MG/DL (ref 8–23)
CALCIUM SERPL-MCNC: 9.6 MG/DL (ref 8.7–10.5)
CHLORIDE SERPL-SCNC: 106 MMOL/L (ref 95–110)
CO2 SERPL-SCNC: 26 MMOL/L (ref 23–29)
CREAT SERPL-MCNC: 5.4 MG/DL (ref 0.5–1.4)
DIFFERENTIAL METHOD: ABNORMAL
EOSINOPHIL # BLD AUTO: 0 K/UL (ref 0–0.5)
EOSINOPHIL NFR BLD: 1 % (ref 0–8)
ERYTHROCYTE [DISTWIDTH] IN BLOOD BY AUTOMATED COUNT: 13 % (ref 11.5–14.5)
EST. GFR  (AFRICAN AMERICAN): 8 ML/MIN/1.73 M^2
EST. GFR  (NON AFRICAN AMERICAN): 7 ML/MIN/1.73 M^2
GLUCOSE SERPL-MCNC: 181 MG/DL (ref 70–110)
HCT VFR BLD AUTO: 28.7 % (ref 37–48.5)
HGB BLD-MCNC: 9.1 G/DL (ref 12–16)
IMM GRANULOCYTES # BLD AUTO: 0.01 K/UL (ref 0–0.04)
IMM GRANULOCYTES NFR BLD AUTO: 0.2 % (ref 0–0.5)
LYMPHOCYTES # BLD AUTO: 0.9 K/UL (ref 1–4.8)
LYMPHOCYTES NFR BLD: 21.3 % (ref 18–48)
MCH RBC QN AUTO: 31 PG (ref 27–31)
MCHC RBC AUTO-ENTMCNC: 31.7 G/DL (ref 32–36)
MCV RBC AUTO: 98 FL (ref 82–98)
MONOCYTES # BLD AUTO: 0.3 K/UL (ref 0.3–1)
MONOCYTES NFR BLD: 6.7 % (ref 4–15)
NEUTROPHILS # BLD AUTO: 2.9 K/UL (ref 1.8–7.7)
NEUTROPHILS NFR BLD: 70.3 % (ref 38–73)
NRBC BLD-RTO: 0 /100 WBC
PLATELET # BLD AUTO: 171 K/UL (ref 150–350)
PMV BLD AUTO: 9.8 FL (ref 9.2–12.9)
POTASSIUM SERPL-SCNC: 4.8 MMOL/L (ref 3.5–5.1)
PROT SERPL-MCNC: 6.7 G/DL (ref 6–8.4)
RBC # BLD AUTO: 2.94 M/UL (ref 4–5.4)
SODIUM SERPL-SCNC: 143 MMOL/L (ref 136–145)
WBC # BLD AUTO: 4.18 K/UL (ref 3.9–12.7)

## 2020-05-29 PROCEDURE — 2024F PR 7 FIELD PHOTOS WITH INTERP/ REVIEW: ICD-10-PCS | Mod: HCNC,S$GLB,, | Performed by: INTERNAL MEDICINE

## 2020-05-29 PROCEDURE — 99214 PR OFFICE/OUTPT VISIT, EST, LEVL IV, 30-39 MIN: ICD-10-PCS | Mod: HCNC,S$GLB,, | Performed by: INTERNAL MEDICINE

## 2020-05-29 PROCEDURE — 3074F PR MOST RECENT SYSTOLIC BLOOD PRESSURE < 130 MM HG: ICD-10-PCS | Mod: HCNC,CPTII,S$GLB, | Performed by: INTERNAL MEDICINE

## 2020-05-29 PROCEDURE — 99999 PR PBB SHADOW E&M-EST. PATIENT-LVL III: CPT | Mod: PBBFAC,HCNC,, | Performed by: INTERNAL MEDICINE

## 2020-05-29 PROCEDURE — 1159F MED LIST DOCD IN RCRD: CPT | Mod: HCNC,S$GLB,, | Performed by: INTERNAL MEDICINE

## 2020-05-29 PROCEDURE — 80053 COMPREHEN METABOLIC PANEL: CPT | Mod: HCNC,NTX

## 2020-05-29 PROCEDURE — 3046F PR MOST RECENT HEMOGLOBIN A1C LEVEL > 9.0%: ICD-10-PCS | Mod: HCNC,CPTII,S$GLB, | Performed by: INTERNAL MEDICINE

## 2020-05-29 PROCEDURE — 2024F 7 FLD RTA PHOTO EVC RTNOPTHY: CPT | Mod: HCNC,S$GLB,, | Performed by: INTERNAL MEDICINE

## 2020-05-29 PROCEDURE — 99999 PR PBB SHADOW E&M-EST. PATIENT-LVL III: ICD-10-PCS | Mod: PBBFAC,HCNC,, | Performed by: INTERNAL MEDICINE

## 2020-05-29 PROCEDURE — 96372 THER/PROPH/DIAG INJ SC/IM: CPT | Mod: HCNC,NTX

## 2020-05-29 PROCEDURE — 36415 COLL VENOUS BLD VENIPUNCTURE: CPT | Mod: HCNC,NTX

## 2020-05-29 PROCEDURE — 85025 COMPLETE CBC W/AUTO DIFF WBC: CPT | Mod: HCNC,NTX

## 2020-05-29 PROCEDURE — 99499 UNLISTED E&M SERVICE: CPT | Mod: HCNC,S$GLB,, | Performed by: INTERNAL MEDICINE

## 2020-05-29 PROCEDURE — 3078F PR MOST RECENT DIASTOLIC BLOOD PRESSURE < 80 MM HG: ICD-10-PCS | Mod: HCNC,CPTII,S$GLB, | Performed by: INTERNAL MEDICINE

## 2020-05-29 PROCEDURE — 1101F PT FALLS ASSESS-DOCD LE1/YR: CPT | Mod: HCNC,CPTII,S$GLB, | Performed by: INTERNAL MEDICINE

## 2020-05-29 PROCEDURE — 3074F SYST BP LT 130 MM HG: CPT | Mod: HCNC,CPTII,S$GLB, | Performed by: INTERNAL MEDICINE

## 2020-05-29 PROCEDURE — 99214 OFFICE O/P EST MOD 30 MIN: CPT | Mod: HCNC,S$GLB,, | Performed by: INTERNAL MEDICINE

## 2020-05-29 PROCEDURE — 1101F PR PT FALLS ASSESS DOC 0-1 FALLS W/OUT INJ PAST YR: ICD-10-PCS | Mod: HCNC,CPTII,S$GLB, | Performed by: INTERNAL MEDICINE

## 2020-05-29 PROCEDURE — 63600175 PHARM REV CODE 636 W HCPCS: Mod: HCNC | Performed by: INTERNAL MEDICINE

## 2020-05-29 PROCEDURE — 3046F HEMOGLOBIN A1C LEVEL >9.0%: CPT | Mod: HCNC,CPTII,S$GLB, | Performed by: INTERNAL MEDICINE

## 2020-05-29 PROCEDURE — 99499 RISK ADDL DX/OHS AUDIT: ICD-10-PCS | Mod: HCNC,S$GLB,, | Performed by: INTERNAL MEDICINE

## 2020-05-29 PROCEDURE — 1126F AMNT PAIN NOTED NONE PRSNT: CPT | Mod: HCNC,S$GLB,, | Performed by: INTERNAL MEDICINE

## 2020-05-29 PROCEDURE — 1126F PR PAIN SEVERITY QUANTIFIED, NO PAIN PRESENT: ICD-10-PCS | Mod: HCNC,S$GLB,, | Performed by: INTERNAL MEDICINE

## 2020-05-29 PROCEDURE — 3078F DIAST BP <80 MM HG: CPT | Mod: HCNC,CPTII,S$GLB, | Performed by: INTERNAL MEDICINE

## 2020-05-29 PROCEDURE — 1159F PR MEDICATION LIST DOCUMENTED IN MEDICAL RECORD: ICD-10-PCS | Mod: HCNC,S$GLB,, | Performed by: INTERNAL MEDICINE

## 2020-05-29 RX ADMIN — EPOETIN ALFA-EPBX 20000 UNITS: 10000 INJECTION, SOLUTION INTRAVENOUS; SUBCUTANEOUS at 02:05

## 2020-05-29 NOTE — NURSING
Labs reviewed. Retacrit administered SQ to right upper arm. Pt tolerated well. Bandaid applied. Pt stayed in clinic 15 mins post injection with no adverse reaction noted. 2 wk f/u appts made w/ lab, provider and infusion.

## 2020-05-29 NOTE — H&P (VIEW-ONLY)
She had home    Subjective:      DATE OF VISIT: 5/31/2020   ?   ?   Patient ID:?Avril Monzon is a 75 y.o. female.?? MR#: [id number]   ?   PRIMARY PROVIDER: Dr. De La Cruz    CHIEF COMPLAINT:  Follow-up  ?   DIAGNOSIS:  Anemia of chronic kidney disease?????   ?   CURRENT TREATMENT:  DIANE, 16727 units every other week for hemoglobin less than 10    INTERVAL EVENTS    I had the pleasure following up with Ms. Monzon who returns for follow-up.  She has been followed for anemia of chronic kidney disease in the setting of type 2 diabetes and hypertension.  She has been receiving 93839 units of Procrit as needed for hemoglobin less than 10 but has rarely required.  She comes for more urgent visit due to notable worsening in hemoglobin.  She continues to follow with Nephrology due to progressive renal dysfunction planning for initiation of dialysis. She continues do well without infectious symptoms, evidence of bleeding, notable fatigue, change in appetite or weight.          Review of Systems    ?   A comprehensive 14-point review of systems was reviewed with patient and was negative other than as specified above.   ?     Objective:      Physical Exam      ?   Vitals:    05/29/20 1310   BP: (!) 152/67   Pulse: 63   Resp: 16   Temp: 97.7 °F (36.5 °C)      ?   ECOG:?.0    General appearance: Generally well appearing, in no acute distress.   Head, eyes, ears, nose, and throat: Oropharynx clear with moist mucous membranes.   Cardiovascular: Regular rate and rhythm, S1, S2, no audible murmurs.   Respiratory: Lungs clear to auscultation bilaterally.   Abdomen: nontender, nondistended.   Extremities: Warm, without edema.   Neurologic: Alert and oriented. Grossly normal strength, coordination, and gait.   Skin: No rashes, ecchymoses or petechial lesion.   Psychiatric: normal mood and affect, conversant and appropriate    ?   Laboratory:  ?   Lab Visit on 05/29/2020   Component Date Value Ref Range Status    WBC 05/29/2020 4.18  3.90 -  12.70 K/uL Final    RBC 05/29/2020 2.94* 4.00 - 5.40 M/uL Final    Hemoglobin 05/29/2020 9.1* 12.0 - 16.0 g/dL Final    Hematocrit 05/29/2020 28.7* 37.0 - 48.5 % Final    Mean Corpuscular Volume 05/29/2020 98  82 - 98 fL Final    Mean Corpuscular Hemoglobin 05/29/2020 31.0  27.0 - 31.0 pg Final    Mean Corpuscular Hemoglobin Conc 05/29/2020 31.7* 32.0 - 36.0 g/dL Final    RDW 05/29/2020 13.0  11.5 - 14.5 % Final    Platelets 05/29/2020 171  150 - 350 K/uL Final    MPV 05/29/2020 9.8  9.2 - 12.9 fL Final    Immature Granulocytes 05/29/2020 0.2  0.0 - 0.5 % Final    Gran # (ANC) 05/29/2020 2.9  1.8 - 7.7 K/uL Final    Immature Grans (Abs) 05/29/2020 0.01  0.00 - 0.04 K/uL Final    Lymph # 05/29/2020 0.9* 1.0 - 4.8 K/uL Final    Mono # 05/29/2020 0.3  0.3 - 1.0 K/uL Final    Eos # 05/29/2020 0.0  0.0 - 0.5 K/uL Final    Baso # 05/29/2020 0.02  0.00 - 0.20 K/uL Final    nRBC 05/29/2020 0  0 /100 WBC Final    Gran% 05/29/2020 70.3  38.0 - 73.0 % Final    Lymph% 05/29/2020 21.3  18.0 - 48.0 % Final    Mono% 05/29/2020 6.7  4.0 - 15.0 % Final    Eosinophil% 05/29/2020 1.0  0.0 - 8.0 % Final    Basophil% 05/29/2020 0.5  0.0 - 1.9 % Final    Differential Method 05/29/2020 Automated   Final    Sodium 05/29/2020 143  136 - 145 mmol/L Final    Potassium 05/29/2020 4.8  3.5 - 5.1 mmol/L Final    Chloride 05/29/2020 106  95 - 110 mmol/L Final    CO2 05/29/2020 26  23 - 29 mmol/L Final    Glucose 05/29/2020 181* 70 - 110 mg/dL Final    BUN, Bld 05/29/2020 90* 8 - 23 mg/dL Final    Creatinine 05/29/2020 5.4* 0.5 - 1.4 mg/dL Final    Calcium 05/29/2020 9.6  8.7 - 10.5 mg/dL Final    Total Protein 05/29/2020 6.7  6.0 - 8.4 g/dL Final    Albumin 05/29/2020 3.9  3.5 - 5.2 g/dL Final    Total Bilirubin 05/29/2020 0.3  0.1 - 1.0 mg/dL Final    Alkaline Phosphatase 05/29/2020 66  55 - 135 U/L Final    AST 05/29/2020 15  10 - 40 U/L Final    ALT 05/29/2020 14  10 - 44 U/L Final    Anion Gap 05/29/2020  11  8 - 16 mmol/L Final    eGFR if African American 05/29/2020 8* >60 mL/min/1.73 m^2 Final    eGFR if non African American 05/29/2020 7* >60 mL/min/1.73 m^2 Final      ?   ?  Lab Results   Component Value Date    IRON 56 05/11/2020    TIBC 275 05/11/2020    FERRITIN 605 (H) 05/11/2020      20% saturation    Assessment/Plan:       1. Anemia due to stage 5 chronic kidney disease    2. Hypertension associated with diabetes          Plan:     # anemia:  Had received Procrit for hemoglobin less than 10 but rarely required.  Recently notable worsening of anemia hemoglobin in 7s.  I perform further workup including iron indices without notable deficiency, no evidence of hemolysis, peripheral blood smear without notable abnormalities aside from mild leukopenia and anemia.  No paraprotein.  She does have elevated kappa free light chains  her renal function has declined recently and did discuss differential diagnosis including anemia of CKD but cannot rule out underlying bone marrow etiology.  I have recommended bone marrow biopsy which she is amenable to and will schedule.  Will proceed with Procrit today for hemoglobin less than 10.  Will continue Q 2 weeks with repeat labs in 1 month.    # chronic kidney disease:  Progressive renal disease associated with type 2 diabetes and hypertension.  Follow-up with renal/vascular pending initiation of dialysis.    # hypertension:  Continues to be poorly controlled.  Did not take her medications today and reinforced importance of following with PCP.    Follow-Up:   - procrit today and Q 2 weeks  - bone marrow biopsy in hospital  - RV in 4 weeks with labs prior and procrit

## 2020-05-29 NOTE — PROGRESS NOTES
She had home    Subjective:      DATE OF VISIT: 5/31/2020   ?   ?   Patient ID:?Avril Monzon is a 75 y.o. female.?? MR#: [id number]   ?   PRIMARY PROVIDER: Dr. De La Cruz    CHIEF COMPLAINT:  Follow-up  ?   DIAGNOSIS:  Anemia of chronic kidney disease?????   ?   CURRENT TREATMENT:  DIANE, 82066 units every other week for hemoglobin less than 10    INTERVAL EVENTS    I had the pleasure following up with Ms. Monzon who returns for follow-up.  She has been followed for anemia of chronic kidney disease in the setting of type 2 diabetes and hypertension.  She has been receiving 20376 units of Procrit as needed for hemoglobin less than 10 but has rarely required.  She comes for more urgent visit due to notable worsening in hemoglobin.  She continues to follow with Nephrology due to progressive renal dysfunction planning for initiation of dialysis. She continues do well without infectious symptoms, evidence of bleeding, notable fatigue, change in appetite or weight.          Review of Systems    ?   A comprehensive 14-point review of systems was reviewed with patient and was negative other than as specified above.   ?     Objective:      Physical Exam      ?   Vitals:    05/29/20 1310   BP: (!) 152/67   Pulse: 63   Resp: 16   Temp: 97.7 °F (36.5 °C)      ?   ECOG:?.0    General appearance: Generally well appearing, in no acute distress.   Head, eyes, ears, nose, and throat: Oropharynx clear with moist mucous membranes.   Cardiovascular: Regular rate and rhythm, S1, S2, no audible murmurs.   Respiratory: Lungs clear to auscultation bilaterally.   Abdomen: nontender, nondistended.   Extremities: Warm, without edema.   Neurologic: Alert and oriented. Grossly normal strength, coordination, and gait.   Skin: No rashes, ecchymoses or petechial lesion.   Psychiatric: normal mood and affect, conversant and appropriate    ?   Laboratory:  ?   Lab Visit on 05/29/2020   Component Date Value Ref Range Status    WBC 05/29/2020 4.18  3.90 -  12.70 K/uL Final    RBC 05/29/2020 2.94* 4.00 - 5.40 M/uL Final    Hemoglobin 05/29/2020 9.1* 12.0 - 16.0 g/dL Final    Hematocrit 05/29/2020 28.7* 37.0 - 48.5 % Final    Mean Corpuscular Volume 05/29/2020 98  82 - 98 fL Final    Mean Corpuscular Hemoglobin 05/29/2020 31.0  27.0 - 31.0 pg Final    Mean Corpuscular Hemoglobin Conc 05/29/2020 31.7* 32.0 - 36.0 g/dL Final    RDW 05/29/2020 13.0  11.5 - 14.5 % Final    Platelets 05/29/2020 171  150 - 350 K/uL Final    MPV 05/29/2020 9.8  9.2 - 12.9 fL Final    Immature Granulocytes 05/29/2020 0.2  0.0 - 0.5 % Final    Gran # (ANC) 05/29/2020 2.9  1.8 - 7.7 K/uL Final    Immature Grans (Abs) 05/29/2020 0.01  0.00 - 0.04 K/uL Final    Lymph # 05/29/2020 0.9* 1.0 - 4.8 K/uL Final    Mono # 05/29/2020 0.3  0.3 - 1.0 K/uL Final    Eos # 05/29/2020 0.0  0.0 - 0.5 K/uL Final    Baso # 05/29/2020 0.02  0.00 - 0.20 K/uL Final    nRBC 05/29/2020 0  0 /100 WBC Final    Gran% 05/29/2020 70.3  38.0 - 73.0 % Final    Lymph% 05/29/2020 21.3  18.0 - 48.0 % Final    Mono% 05/29/2020 6.7  4.0 - 15.0 % Final    Eosinophil% 05/29/2020 1.0  0.0 - 8.0 % Final    Basophil% 05/29/2020 0.5  0.0 - 1.9 % Final    Differential Method 05/29/2020 Automated   Final    Sodium 05/29/2020 143  136 - 145 mmol/L Final    Potassium 05/29/2020 4.8  3.5 - 5.1 mmol/L Final    Chloride 05/29/2020 106  95 - 110 mmol/L Final    CO2 05/29/2020 26  23 - 29 mmol/L Final    Glucose 05/29/2020 181* 70 - 110 mg/dL Final    BUN, Bld 05/29/2020 90* 8 - 23 mg/dL Final    Creatinine 05/29/2020 5.4* 0.5 - 1.4 mg/dL Final    Calcium 05/29/2020 9.6  8.7 - 10.5 mg/dL Final    Total Protein 05/29/2020 6.7  6.0 - 8.4 g/dL Final    Albumin 05/29/2020 3.9  3.5 - 5.2 g/dL Final    Total Bilirubin 05/29/2020 0.3  0.1 - 1.0 mg/dL Final    Alkaline Phosphatase 05/29/2020 66  55 - 135 U/L Final    AST 05/29/2020 15  10 - 40 U/L Final    ALT 05/29/2020 14  10 - 44 U/L Final    Anion Gap 05/29/2020  11  8 - 16 mmol/L Final    eGFR if African American 05/29/2020 8* >60 mL/min/1.73 m^2 Final    eGFR if non African American 05/29/2020 7* >60 mL/min/1.73 m^2 Final      ?   ?  Lab Results   Component Value Date    IRON 56 05/11/2020    TIBC 275 05/11/2020    FERRITIN 605 (H) 05/11/2020      20% saturation    Assessment/Plan:       1. Anemia due to stage 5 chronic kidney disease    2. Hypertension associated with diabetes          Plan:     # anemia:  Had received Procrit for hemoglobin less than 10 but rarely required.  Recently notable worsening of anemia hemoglobin in 7s.  I perform further workup including iron indices without notable deficiency, no evidence of hemolysis, peripheral blood smear without notable abnormalities aside from mild leukopenia and anemia.  No paraprotein.  She does have elevated kappa free light chains  her renal function has declined recently and did discuss differential diagnosis including anemia of CKD but cannot rule out underlying bone marrow etiology.  I have recommended bone marrow biopsy which she is amenable to and will schedule.  Will proceed with Procrit today for hemoglobin less than 10.  Will continue Q 2 weeks with repeat labs in 1 month.    # chronic kidney disease:  Progressive renal disease associated with type 2 diabetes and hypertension.  Follow-up with renal/vascular pending initiation of dialysis.    # hypertension:  Continues to be poorly controlled.  Did not take her medications today and reinforced importance of following with PCP.    Follow-Up:   - procrit today and Q 2 weeks  - bone marrow biopsy in hospital  - RV in 4 weeks with labs prior and procrit

## 2020-06-01 ENCOUNTER — TELEPHONE (OUTPATIENT)
Dept: HEMATOLOGY/ONCOLOGY | Facility: CLINIC | Age: 75
End: 2020-06-01

## 2020-06-01 NOTE — TELEPHONE ENCOUNTER
----- Message from Laura Murillo sent at 6/1/2020 12:00 PM CDT -----  Contact: Patient's daughter- Elizabeth  Patient had a missed call and believe it was from 's office about her 05/29/2020 labs maybe. Please call to advise at Ph .[phone] (home)

## 2020-06-10 ENCOUNTER — TELEPHONE (OUTPATIENT)
Dept: FAMILY MEDICINE | Facility: CLINIC | Age: 75
End: 2020-06-10

## 2020-06-10 NOTE — TELEPHONE ENCOUNTER
----- Message from Bonnie Blackwell sent at 6/10/2020 10:15 AM CDT -----  Contact: patient  Type:  Needs Medical Advice    Who Called: patient  Symptoms (please be specific): leg cramps   How long has patient had these symptoms:  Yesterday evening  Pharmacy name and phone #:    [id number] - [place] - [address] [address]  [address] [address]  [place] [address]  Phone: [phone] Fax: [phone]      Would the patient rather a call back or a response via [facility]? Call   Best Call Back Number: [phone]  Additional Information: please call patient ASAP TODAY!

## 2020-06-12 ENCOUNTER — TELEPHONE (OUTPATIENT)
Dept: RADIOLOGY | Facility: HOSPITAL | Age: 75
End: 2020-06-12

## 2020-06-12 ENCOUNTER — OFFICE VISIT (OUTPATIENT)
Dept: PODIATRY | Facility: CLINIC | Age: 75
End: 2020-06-12
Payer: MEDICARE

## 2020-06-12 ENCOUNTER — PATIENT OUTREACH (OUTPATIENT)
Dept: ADMINISTRATIVE | Facility: OTHER | Age: 75
End: 2020-06-12

## 2020-06-12 VITALS
SYSTOLIC BLOOD PRESSURE: 178 MMHG | DIASTOLIC BLOOD PRESSURE: 64 MMHG | WEIGHT: 134.25 LBS | HEART RATE: 67 BPM | HEIGHT: 65 IN | BODY MASS INDEX: 22.37 KG/M2

## 2020-06-12 DIAGNOSIS — Z79.4 TYPE 2 DIABETES MELLITUS WITH STABLE PROLIFERATIVE RETINOPATHY OF BOTH EYES, WITH LONG-TERM CURRENT USE OF INSULIN: Primary | ICD-10-CM

## 2020-06-12 DIAGNOSIS — B35.1 DERMATOPHYTOSIS OF NAIL: ICD-10-CM

## 2020-06-12 DIAGNOSIS — E11.42 DM TYPE 2 WITH DIABETIC PERIPHERAL NEUROPATHY: ICD-10-CM

## 2020-06-12 DIAGNOSIS — N18.4 ANEMIA IN STAGE 4 CHRONIC KIDNEY DISEASE: Chronic | ICD-10-CM

## 2020-06-12 DIAGNOSIS — Z79.4 TYPE 2 DIABETES MELLITUS WITH DIABETIC PERIPHERAL ANGIOPATHY WITHOUT GANGRENE, WITH LONG-TERM CURRENT USE OF INSULIN: ICD-10-CM

## 2020-06-12 DIAGNOSIS — D50.0 IRON DEFICIENCY ANEMIA DUE TO CHRONIC BLOOD LOSS: ICD-10-CM

## 2020-06-12 DIAGNOSIS — E11.3553 TYPE 2 DIABETES MELLITUS WITH STABLE PROLIFERATIVE RETINOPATHY OF BOTH EYES, WITH LONG-TERM CURRENT USE OF INSULIN: Primary | ICD-10-CM

## 2020-06-12 DIAGNOSIS — D63.1 ANEMIA IN STAGE 4 CHRONIC KIDNEY DISEASE: Chronic | ICD-10-CM

## 2020-06-12 DIAGNOSIS — E11.51 TYPE 2 DIABETES MELLITUS WITH DIABETIC PERIPHERAL ANGIOPATHY WITHOUT GANGRENE, WITH LONG-TERM CURRENT USE OF INSULIN: ICD-10-CM

## 2020-06-12 DIAGNOSIS — N18.5 STAGE 5 CHRONIC KIDNEY DISEASE: ICD-10-CM

## 2020-06-12 DIAGNOSIS — D63.1 ANEMIA DUE TO STAGE 5 CHRONIC KIDNEY DISEASE: ICD-10-CM

## 2020-06-12 DIAGNOSIS — N18.5 ANEMIA DUE TO STAGE 5 CHRONIC KIDNEY DISEASE: ICD-10-CM

## 2020-06-12 DIAGNOSIS — R80.9 PROTEINURIA, UNSPECIFIED TYPE: Primary | ICD-10-CM

## 2020-06-12 PROCEDURE — 99499 NO LOS: ICD-10-PCS | Mod: HCNC,S$GLB,, | Performed by: PODIATRIST

## 2020-06-12 PROCEDURE — 99999 PR PBB SHADOW E&M-EST. PATIENT-LVL IV: CPT | Mod: PBBFAC,HCNC,, | Performed by: PODIATRIST

## 2020-06-12 PROCEDURE — 99999 PR PBB SHADOW E&M-EST. PATIENT-LVL IV: ICD-10-PCS | Mod: PBBFAC,HCNC,, | Performed by: PODIATRIST

## 2020-06-12 PROCEDURE — 11720 PR DEBRIDEMENT OF NAIL(S), 1-5: ICD-10-PCS | Mod: 59,Q9,HCNC,S$GLB | Performed by: PODIATRIST

## 2020-06-12 PROCEDURE — 11719 TRIM NAIL(S) ANY NUMBER: CPT | Mod: Q9,HCNC,S$GLB, | Performed by: PODIATRIST

## 2020-06-12 PROCEDURE — 11720 DEBRIDE NAIL 1-5: CPT | Mod: 59,Q9,HCNC,S$GLB | Performed by: PODIATRIST

## 2020-06-12 PROCEDURE — 11719 PR TRIM NAIL(S): ICD-10-PCS | Mod: Q9,HCNC,S$GLB, | Performed by: PODIATRIST

## 2020-06-12 PROCEDURE — 99499 UNLISTED E&M SERVICE: CPT | Mod: HCNC,S$GLB,, | Performed by: PODIATRIST

## 2020-06-12 NOTE — PROGRESS NOTES
[facility] -   PODIATRIC MEDICINE AND SURGERY  PROGRESS NOTE  6/12/2020    PODIATRY NOTE  PCP: Dr. Rose Germain MD, Last Visit 1/31/20    CHIEF COMPLAINT   Chief Complaint   Patient presents with    Routine Foot Care     PCP Dr. Germain 3 mo [facility]       HPI:    Avril Monzon is a 75 y.o. female who has a past medical history of Acute hypoxemic respiratory failure (11/26/2018), Anemia, Arthritis, Back pain, Cataract, CKD stage G4/A3, GFR 15 - 29 and albumin creatinine ratio >300 mg/g, Coronary artery disease involving native coronary artery of native heart (11/30/2018), Diabetic retinopathy, DM (diabetes mellitus) type II controlled, neurological manifestation, Exudative age-related macular degeneration of left eye with active choroidal neovascularization (2/5/2019), GERD (gastroesophageal reflux disease), Glaucoma, Heart failure, Hyperlipidemia, Hypertension, Non-ST elevation MI (NSTEMI) (11/28/2018), NSTEMI (non-ST elevated myocardial infarction) (11/27/2018), Peripheral neuropathy, Polyneuropathy, Proteinuria, Seizures, Stroke (2012,2014), Tobacco dependence, and Type 2 diabetes with peripheral circulatory disorder, controlled.     Avril presents to clinic today complaining of painful elongated toenails. She is legally blind and unable to trim her nails.  She requests at risk foot care due to thickened, painful, elongated toenails. Relief is obtained with routine debridements. Patient denies other pedal complaints at this time.     Hemoglobin A1C   Date Value Ref Range Status   12/09/2019 12.9 (H) 4.0 - 5.6 % Final     Comment:     ADA Screening Guidelines:  5.7-6.4%  Consistent with prediabetes  >or=6.5%  Consistent with diabetes  High levels of fetal hemoglobin interfere with the HbA1C  assay. Heterozygous hemoglobin variants (HbS, HgC, etc)do  not significantly interfere with this assay.   However, presence of multiple variants may affect accuracy.     03/27/2019 7.0 (H) 4.0 - 5.6 % Final      Comment:     ADA Screening Guidelines:  5.7-6.4%  Consistent with prediabetes  >or=6.5%  Consistent with diabetes  High levels of fetal hemoglobin interfere with the HbA1C  assay. Heterozygous hemoglobin variants (HbS, HgC, etc)do  not significantly interfere with this assay.   However, presence of multiple variants may affect accuracy.     11/27/2018 6.7 (H) 4.0 - 5.6 % Final     Comment:     ADA Screening Guidelines:  5.7-6.4%  Consistent with prediabetes  >or=6.5%  Consistent with diabetes  High levels of fetal hemoglobin interfere with the HbA1C  assay. Heterozygous hemoglobin variants (HbS, HgC, etc)do  not significantly interfere with this assay.   However, presence of multiple variants may affect accuracy.           PMH  Past Medical History:   Diagnosis Date    Acute hypoxemic respiratory failure 11/26/2018    Anemia     Arthritis     back, hand, knees    Back pain     Cataract     CKD stage G4/A3, GFR 15 - 29 and albumin creatinine ratio >300 mg/g     GFR 40% Jan 2014 and 33% ub 3/2014 ([facility])    Coronary artery disease involving native coronary artery of native heart 11/30/2018    Diabetic retinopathy     DM (diabetes mellitus) type II controlled, neurological manifestation     Exudative age-related macular degeneration of left eye with active choroidal neovascularization 2/5/2019    GERD (gastroesophageal reflux disease)     Glaucoma     Heart failure     Hyperlipidemia     Hypertension     Non-ST elevation MI (NSTEMI) 11/28/2018    NSTEMI (non-ST elevated myocardial infarction) 11/27/2018    Peripheral neuropathy     Polyneuropathy     Proteinuria     >6 mo     Seizures     [facility] 1/2014 -dick CT NRI showed small vessel    Stroke 2012,2014    Tobacco dependence     Type 2 diabetes with peripheral circulatory disorder, controlled        PROBLEM LIST  Patient Active Problem List    Diagnosis Date Noted    Anemia due to stage 5 chronic kidney disease 05/07/2019    Age-related nuclear  cataract of left eye 05/06/2019    Accelerated hypertension with diastolic congestive heart failure, NYHA class 3 04/05/2019    Non-rheumatic mitral regurgitation 04/05/2019    Non-rheumatic tricuspid valve insufficiency 04/05/2019    Other headache syndrome 03/26/2019    Exudative age-related macular degeneration of left eye with active choroidal neovascularization 02/05/2019    Chronic combined systolic and diastolic congestive heart failure 12/12/2018    Stage 5 chronic kidney disease 11/30/2018    Coronary artery disease involving native coronary artery of native heart 11/30/2018    Heart failure     Pulmonary hypertension 11/28/2018    Hyperlipidemia associated with type 2 diabetes mellitus 06/08/2018    Iron deficiency anemia due to chronic blood loss 02/22/2018    Anemia in stage 4 chronic kidney disease 05/18/2017    History of CVA (cerebrovascular accident) 05/16/2017    Hyperparathyroidism 05/16/2017    Vitamin D deficiency 05/16/2017    DDD (degenerative disc disease), lumbar 05/16/2017    Bilateral carotid artery disease 11/11/2016    Aortic atherosclerosis 11/11/2016    Type 2 diabetes mellitus with circulatory disorder, with long-term current use of insulin 11/11/2016    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, with long-term current use of insulin 05/02/2016    Cataracta brunescens of right eye 05/02/2016    Chronic kidney disease (CKD) stage G4/A3, severely decreased glomerular filtration rate (GFR) between 15-29 mL/min/1.73 square meter and albuminuria creatinine ratio greater than 300 mg/g     Proteinuria     Constipation 02/18/2014    Hypertension associated with diabetes     Controlled type 2 diabetes mellitus with diabetic polyneuropathy, with long-term current use of insulin        MEDS  Current Outpatient Medications on File Prior to Visit   Medication Sig Dispense Refill    ACCU-CHEK ARDEN PLUS METER Misc USE TO TEST TWICE DAILY   0    amLODIPine (NORVASC) 5 MG tablet Take 5 mg by mouth once daily.      aspirin (ECOTRIN) 81 MG EC tablet Take 1 tablet (81 mg total) by mouth once daily.  0    atorvastatin (LIPITOR) 80 MG tablet Take 1 tablet (80 mg total) by mouth once daily. (Patient taking differently: Take 40 mg by mouth once daily. ) 30 tablet 11    BIDIL 20-37.5 mg Tab TAKE 2 TABLETS BY MOUTH THREE TIMES DAILY 180 tablet 11    blood sugar diagnostic Strp 1 strip by Misc.(Non-Drug; Combo Route) route 3 (three) times daily. relion ultima 100 strip 11    calcitRIOL (ROCALTROL) 0.25 MCG Cap Take 1 capsule by mouth once daily 30 capsule 9    clopidogrel (PLAVIX) 75 mg tablet TAKE 1 TABLET BY MOUTH ONCE DAILY 39 tablet 6    fluticasone propionate (FLONASE) 50 mcg/actuation nasal spray 1 spray (50 mcg total) by Each Nostril route once daily. 16 g 0    furosemide (LASIX) 20 MG tablet       insulin NPH-insulin regular, 70/30, (NOVOLIN 70/30) 100 unit/mL (70-30) injection INJECT SUBCUTANEOUSLY 30 UNITS IN THE MORNING AND INJECT 25 UNITS IN THE EVENING (Patient taking differently: INJECT SUBCUTANEOUSLY 30 UNITS IN THE MORNING AND INJECT 25 UNITS IN THE EVENING) 2 vial 3    lancets (ACCU-CHEK SOFTCLIX LANCETS) Misc 1 lancet by Misc.(Non-Drug; Combo Route) route 3 (three) times daily. 100 each 11    loratadine (CLARITIN) 10 mg tablet Take 1 tablet (10 mg total) by mouth once daily. 30 tablet 0    metoprolol tartrate (LOPRESSOR) 25 MG tablet TAKE 1 TABLET BY MOUTH TWICE DAILY 60 tablet 6    nitroGLYCERIN (NITROSTAT) 0.4 MG SL tablet Place 1 tablet (0.4 mg total) under the tongue every 5 (five) minutes as needed for Chest pain. 20 tablet 0    ondansetron (ZOFRAN-ODT) 4 MG TbDL DISSOLVE 1 TABLET IN MOUTH THREE TIMES DAILY AS NEEDED FOR NAUSEA FOR 5 DAYS  0    sodium bicarbonate 325 MG tablet TAKE 1 TABLET BY MOUTH THREE TIMES DAILY 90 tablet 11    torsemide (DEMADEX) 20 MG Tab Take 2 tablets (40 mg total) by mouth once daily. 180 tablet 3      Current Facility-Administered Medications on File Prior to Visit   Medication Dose Route Frequency Provider Last Rate Last Dose    aflibercept Soln 2 mg  2 mg Intravitreal 1 time in Clinic/HOD JADIEL Coello MD           Medication List with Changes/Refills   Current Medications    ACCU-CHEK ARDEN PLUS METER [facility]    USE TO TEST TWICE DAILY    AMLODIPINE (NORVASC) 5 MG TABLET    Take 5 mg by mouth once daily.    ASPIRIN (ECOTRIN) 81 MG EC TABLET    Take 1 tablet (81 mg total) by mouth once daily.    ATORVASTATIN (LIPITOR) 80 MG TABLET    Take 1 tablet (80 mg total) by mouth once daily.    BIDIL 20-37.5 MG TAB    TAKE 2 TABLETS BY MOUTH THREE TIMES DAILY    BLOOD SUGAR DIAGNOSTIC STRP    1 strip by Misc.(Non-Drug; Combo Route) route 3 (three) times daily. relion ultima    CALCITRIOL (ROCALTROL) 0.25 MCG CAP    Take 1 capsule by mouth once daily    CLOPIDOGREL (PLAVIX) 75 MG TABLET    TAKE 1 TABLET BY MOUTH ONCE DAILY    FLUTICASONE PROPIONATE (FLONASE) 50 MCG/ACTUATION NASAL SPRAY    1 spray (50 mcg total) by Each Nostril route once daily.    FUROSEMIDE (LASIX) 20 MG TABLET        INSULIN NPH-INSULIN REGULAR, 70/30, (NOVOLIN 70/30) 100 UNIT/ML (70-30) INJECTION    INJECT SUBCUTANEOUSLY 30 UNITS IN THE MORNING AND INJECT 25 UNITS IN THE EVENING    LANCETS (ACCU-CHEK SOFTCLIX LANCETS) MISC    1 lancet by Misc.(Non-Drug; Combo Route) route 3 (three) times daily.    LORATADINE (CLARITIN) 10 MG TABLET    Take 1 tablet (10 mg total) by mouth once daily.    METOPROLOL TARTRATE (LOPRESSOR) 25 MG TABLET    TAKE 1 TABLET BY MOUTH TWICE DAILY    NITROGLYCERIN (NITROSTAT) 0.4 MG SL TABLET    Place 1 tablet (0.4 mg total) under the tongue every 5 (five) minutes as needed for Chest pain.    ONDANSETRON (ZOFRAN-ODT) 4 MG TBDL    DISSOLVE 1 TABLET IN MOUTH THREE TIMES DAILY AS NEEDED FOR NAUSEA FOR 5 DAYS    SODIUM BICARBONATE 325 MG TABLET    TAKE 1 TABLET BY MOUTH THREE TIMES DAILY    TORSEMIDE (DEMADEX) 20 MG TAB    Take 2  tablets (40 mg total) by mouth once daily.       PSH     Past Surgical History:   Procedure Laterality Date    BREAST LUMPECTOMY Left     benign, when patient was 15 years old    BREAST MASS EXCISION      ,benign, age 15.    CATHETERIZATION OF BOTH LEFT AND RIGHT HEART N/A 2018    Procedure: CATHETERIZATION, HEART, BOTH LEFT AND RIGHT;  Surgeon: Michelle Holman MD;  Location: [facility] CATH LAB;  Service: Cardiology;  Laterality: N/A;    CATHETERIZATION OF BOTH LEFT AND RIGHT HEART N/A 2018    Procedure: CATHETERIZATION, HEART, BOTH LEFT AND RIGHT;  Surgeon: Michelle Holman MD;  Location: [facility] CATH LAB;  Service: Cardiology;  Laterality: N/A;    HYSTERECTOMY  1982    INSERTION OF SHUNT      LEFT HEART CATHETERIZATION Left 12/3/2018    Procedure: CATHETERIZATION, HEART, LEFT;  Surgeon: Michelle Holman MD;  Location: [facility] CATH LAB;  Service: Cardiology;  Laterality: Left;  LAD ATHERECTOMY STENT/ FEMORAL APPROACH    OOPHORECTOMY      wrist cyst Right 1980s    dorsal wrist cyst        ALL  Review of patient's allergies indicates:   Allergen Reactions    Codeine Swelling    Darvon [propoxyphene] Swelling       SOC     Social History     Tobacco Use    Smoking status: Former Smoker     Packs/day: 1.50     Years: 30.00     Pack years: 45.00     Types: Cigarettes     Last attempt to quit: 1990     Years since quittin.4    Smokeless tobacco: Former User   Substance Use Topics    Alcohol use: No     Alcohol/week: 0.0 standard drinks    Drug use: No         FAMILY HX    Family History   Problem Relation Age of Onset    Diabetes Mother     Hyperlipidemia Mother     Hypertension Mother     Heart disease Mother         MI    Muscular dystrophy Son     Cancer Maternal Grandmother         colon            REVIEW OF SYSTEMS   General: This patient is well-developed, well-nourished and appears stated age, well-oriented to person, place and time, and cooperative and pleasant on today's  "visit  Constitutional: Negative for chills and fever.   Respiratory: Negative for shortness of breath.    Cardiovascular: Negative for chest pain, palpitations, orthopnea  Gastrointestinal: Negative for diarrhea, nausea and vomiting.   Musculoskeletal: Positive for above noted in HPI  Skin: positive for skin and nail changes  Neurological: positive for tingling and sensory changes  Peripheral Vascular: no claudication or cyanosis  Psychiatric/Behavioral: Negative for altered mental status     PHYSICAL EXAM:      Vitals:    06/12/20 1017   BP: (!) 178/64   Pulse: 67   Weight: 60.9 kg (134 lb 4.2 oz)   Height: 5' 5" (1.651 m)   PainSc: 0-No pain       General: This patient is well-developed, well-nourished and appears stated age, well-oriented to person, place and time, and cooperative and pleasant on today's visit    LOWER EXTREMITY  VASCULAR  Dorsalis pedis 1/4 and posterior tibial pulses palpable 04 bilaterally.   Capillary refill time immediate to the toes.   Feet are warm to the touch. Skin temperature warm to warm from proximally to distally   There are varicosities, telangiectasias noted to bilateral foot and ankle regions.   There are no ecchymoses noted to bilateral foot and ankle regions.   There is gross lower extremity edema.    DERMATOLOGIC  Skin moist with healthy texture and turgor.  There are no open ulcerations, lacerations, or fissures to bilateral foot and ankle regions. There are no signs of infection as there is no erythema, no proximal-extending lymphangiitis, no fluctuance, or crepitus noted on palpation to bilateral foot and ankle regions.   There is no interdigital maceration.   There is hyperkeratotic lesions noted medial left hallux  Nails 1, 4, 5 RIGHT and LEFT foot thickened, and elongated, the remaining 4 nails were elongated and not thickened     NEUROLOGIC  Epicritic sensation is diminished.   Achilles and patellar deep tendon reflexes intact  Babinski reflex " absent    ORTHOPEDIC/BIOMECHANICAL  Muscle strength AT/EHL/EDL/PT: 5/5; Achilles/Gastroc/Soleus: 5/5; PB/PL: 5/5 Muscle tone is normal.  Ankle joint ROM INTACT DF/PF, non-crepitus  STJ ROM  inv/ev, non crepitus         ASSESSMENT     Encounter Diagnoses   Name Primary?    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, with long-term current use of insulin Yes    Type 2 diabetes mellitus with diabetic peripheral angiopathy without gangrene, with long-term current use of insulin     Dermatophytosis of nail          PLAN  Patient was educated about clinical and imaging findings, and verbalizes understanding of above.       -With patient's permission, the elongated onychomycotic toenails, as outlined in the physical examination, were sharply debrided with a double action nail nipper to their soft tissue attachment. If indicated, the nails were then smoothed down in thickness with a mechanical rotary chaz and/or hui board to facilitate in further debridement removing all offending nail and subungual debris    -With patient's permission, nails elongated x 4 were trimmed with nail nipper. Patient relates relief following the procedure. Patient will continue to monitor the areas daily, inspect feet, wear protective shoe gear when ambulatory, moisturizer to maintain skin integrity.    Shoe inspection. Diabetic Foot Educ complications of diabetes. Patient instructed on proper foot hygeine. We discussed wearing proper shoe gear, daily foot inspections, never walking without protective shoe gear, never putting sharp instruments to feet, routine podiatric exams.ation. Patient reminded of the importance of good nutrition and blood sugar control to help prevent podiatric    Future Appointments   Date Time Provider Department Center   6/12/2020  1:00 PM BROOK MORE CC LAB [facility] LAB DS [facility]   6/12/2020  1:30 PM JESSENIA Aguilar HEM ONC [facility]   6/12/2020  2:00 PM CHAIR 11 [facility] CHEMO CC    6/15/2020  8:00 AM [facility] CT1 LIMIT 500 LBS [facility] CT SCAN [facility]   6/29/2020  1:00 PM Hortencia De La Cruz MD [facility] HEM ONC [facility]   9/10/2020 11:45 AM Anette Montanez DPM [facility] POD [place]       Report Electronically Signed By:    Anette Montanez DPM   Podiatric Medicine & Surgery  [facility]  6/12/2020     Disclaimer:  This note may have been prepared using voice recognition software, it may have not been extensively proofed, as such there could be errors within the text such as sound alike errors.

## 2020-06-15 ENCOUNTER — HOSPITAL ENCOUNTER (OUTPATIENT)
Dept: RADIOLOGY | Facility: HOSPITAL | Age: 75
Discharge: HOME | End: 2020-06-15
Attending: INTERNAL MEDICINE
Payer: MEDICARE

## 2020-06-15 ENCOUNTER — INFUSION (OUTPATIENT)
Dept: INFUSION THERAPY | Facility: HOSPITAL | Age: 75
End: 2020-06-15
Attending: INTERNAL MEDICINE
Payer: MEDICARE

## 2020-06-15 VITALS
SYSTOLIC BLOOD PRESSURE: 122 MMHG | RESPIRATION RATE: 18 BRPM | TEMPERATURE: 99 F | HEART RATE: 77 BPM | DIASTOLIC BLOOD PRESSURE: 75 MMHG | OXYGEN SATURATION: 98 %

## 2020-06-15 DIAGNOSIS — N18.5 ANEMIA DUE TO STAGE 5 CHRONIC KIDNEY DISEASE: Primary | ICD-10-CM

## 2020-06-15 DIAGNOSIS — N18.4 ANEMIA IN STAGE 4 CHRONIC KIDNEY DISEASE: ICD-10-CM

## 2020-06-15 DIAGNOSIS — D63.1 ANEMIA DUE TO STAGE 5 CHRONIC KIDNEY DISEASE: Primary | ICD-10-CM

## 2020-06-15 DIAGNOSIS — N18.4 CHRONIC KIDNEY DISEASE (CKD) STAGE G4/A3, SEVERELY DECREASED GLOMERULAR FILTRATION RATE (GFR) BETWEEN 15-29 ML/MIN/1.73 SQUARE METER AND ALBUMINURIA CREATININE RATIO GREATER THAN 300 MG/G: ICD-10-CM

## 2020-06-15 DIAGNOSIS — N18.5 STAGE 5 CHRONIC KIDNEY DISEASE: ICD-10-CM

## 2020-06-15 DIAGNOSIS — D63.1 ANEMIA DUE TO STAGE 5 CHRONIC KIDNEY DISEASE: ICD-10-CM

## 2020-06-15 DIAGNOSIS — D63.1 ANEMIA IN STAGE 4 CHRONIC KIDNEY DISEASE: ICD-10-CM

## 2020-06-15 DIAGNOSIS — N18.5 ANEMIA DUE TO STAGE 5 CHRONIC KIDNEY DISEASE: ICD-10-CM

## 2020-06-15 DIAGNOSIS — D50.0 IRON DEFICIENCY ANEMIA DUE TO CHRONIC BLOOD LOSS: ICD-10-CM

## 2020-06-15 PROCEDURE — 63600175 PHARM REV CODE 636 W HCPCS: Mod: HCNC | Performed by: INTERNAL MEDICINE

## 2020-06-15 PROCEDURE — 96372 THER/PROPH/DIAG INJ SC/IM: CPT | Mod: HCNC

## 2020-06-15 RX ADMIN — EPOETIN ALFA-EPBX 20000 UNITS: 10000 INJECTION, SOLUTION INTRAVENOUS; SUBCUTANEOUS at 09:06

## 2020-06-15 NOTE — NURSING
While asking pre-procedure questions pt admitted that she took her Plavix yesterday. Consulted with Dr. Trent and he would like to reschedule the procedure. Pt was very understanding and agreed to reschedule to next Monday 06/22. Pt ambulated out after getting dressed and will return Monday 06/22. Informed pt to stop her Plavix on Wednesday 06/17.

## 2020-06-22 ENCOUNTER — HOSPITAL ENCOUNTER (OUTPATIENT)
Dept: RADIOLOGY | Facility: HOSPITAL | Age: 75
Discharge: HOME | End: 2020-06-22
Attending: INTERNAL MEDICINE
Payer: MEDICARE

## 2020-06-22 VITALS
HEIGHT: 65 IN | SYSTOLIC BLOOD PRESSURE: 167 MMHG | BODY MASS INDEX: 22.33 KG/M2 | HEART RATE: 64 BPM | WEIGHT: 134 LBS | RESPIRATION RATE: 16 BRPM | OXYGEN SATURATION: 100 % | DIASTOLIC BLOOD PRESSURE: 75 MMHG

## 2020-06-22 PROCEDURE — 88185 FLOWCYTOMETRY/TC ADD-ON: CPT | Mod: HCNC,TXP | Performed by: PATHOLOGY

## 2020-06-22 PROCEDURE — 88342 CHG IMMUNOCYTOCHEMISTRY: ICD-10-PCS | Mod: 26,59,HCNC,NTX | Performed by: PATHOLOGY

## 2020-06-22 PROCEDURE — 88184 FLOWCYTOMETRY/ TC 1 MARKER: CPT | Mod: HCNC,TXP | Performed by: PATHOLOGY

## 2020-06-22 PROCEDURE — 88313 SPECIAL STAINS GROUP 2: CPT | Mod: 26,HCNC,NTX, | Performed by: PATHOLOGY

## 2020-06-22 PROCEDURE — 88271 CYTOGENETICS DNA PROBE: CPT | Mod: 59,HCNC,NTX

## 2020-06-22 PROCEDURE — 88341 PR IHC OR ICC EACH ADD'L SINGLE ANTIBODY  STAINPR: ICD-10-PCS | Mod: 26,HCNC,NTX, | Performed by: PATHOLOGY

## 2020-06-22 PROCEDURE — 88311 DECALCIFY TISSUE: CPT | Mod: HCNC,NTX | Performed by: PATHOLOGY

## 2020-06-22 PROCEDURE — 77012 CT SCAN FOR NEEDLE BIOPSY: CPT | Mod: TC,HCNC,TXP

## 2020-06-22 PROCEDURE — 88305 TISSUE EXAM BY PATHOLOGIST: ICD-10-PCS | Mod: 26,HCNC,NTX, | Performed by: PATHOLOGY

## 2020-06-22 PROCEDURE — 30000890 MAYO MISCELLANEOUS TEST (REFLEX): Mod: HCNC,TXP

## 2020-06-22 PROCEDURE — 88189 PR  FLOWCYTOMETRY/READ, 16 & > MARKERS: ICD-10-PCS | Mod: HCNC,NTX,, | Performed by: PATHOLOGY

## 2020-06-22 PROCEDURE — 85097 PR  BONE MARROW,SMEAR INTERPRETATION: ICD-10-PCS | Mod: HCNC,NTX,, | Performed by: PATHOLOGY

## 2020-06-22 PROCEDURE — 88313 SPECIAL STAINS GROUP 2: CPT | Mod: 59,HCNC,NTX | Performed by: PATHOLOGY

## 2020-06-22 PROCEDURE — 88365 INSITU HYBRIDIZATION (FISH): CPT | Mod: 26,HCNC,NTX, | Performed by: PATHOLOGY

## 2020-06-22 PROCEDURE — 88313 PR  SPECIAL STAINS,GROUP II: ICD-10-PCS | Mod: 26,HCNC,NTX, | Performed by: PATHOLOGY

## 2020-06-22 PROCEDURE — 88237 TISSUE CULTURE BONE MARROW: CPT | Mod: HCNC,NTX

## 2020-06-22 PROCEDURE — 63600175 PHARM REV CODE 636 W HCPCS: Mod: HCNC,TXP | Performed by: RADIOLOGY

## 2020-06-22 PROCEDURE — 88305 TISSUE EXAM BY PATHOLOGIST: CPT | Mod: 59,HCNC,TXP | Performed by: PATHOLOGY

## 2020-06-22 PROCEDURE — 88311 DECALCIFY TISSUE: CPT | Mod: 26,HCNC,NTX, | Performed by: PATHOLOGY

## 2020-06-22 PROCEDURE — 88365 PR  TISSUE HYBRIDIZATION: ICD-10-PCS | Mod: 26,HCNC,NTX, | Performed by: PATHOLOGY

## 2020-06-22 PROCEDURE — 88364 INSITU HYBRIDIZATION (FISH): CPT | Mod: HCNC,NTX | Performed by: PATHOLOGY

## 2020-06-22 PROCEDURE — 88342 IMHCHEM/IMCYTCHM 1ST ANTB: CPT | Mod: 26,59,HCNC,NTX | Performed by: PATHOLOGY

## 2020-06-22 PROCEDURE — 88271 CYTOGENETICS DNA PROBE: CPT | Mod: HCNC,TXP

## 2020-06-22 PROCEDURE — 88341 IMHCHEM/IMCYTCHM EA ADD ANTB: CPT | Mod: 26,HCNC,NTX, | Performed by: PATHOLOGY

## 2020-06-22 PROCEDURE — 88341 IMHCHEM/IMCYTCHM EA ADD ANTB: CPT | Mod: HCNC,TXP | Performed by: PATHOLOGY

## 2020-06-22 PROCEDURE — 88342 IMHCHEM/IMCYTCHM 1ST ANTB: CPT | Mod: HCNC,NTX | Performed by: PATHOLOGY

## 2020-06-22 PROCEDURE — 88305 TISSUE EXAM BY PATHOLOGIST: CPT | Mod: 26,HCNC,NTX, | Performed by: PATHOLOGY

## 2020-06-22 PROCEDURE — 88189 FLOWCYTOMETRY/READ 16 & >: CPT | Mod: HCNC,NTX,, | Performed by: PATHOLOGY

## 2020-06-22 PROCEDURE — 88237 TISSUE CULTURE BONE MARROW: CPT | Mod: HCNC,TXP

## 2020-06-22 PROCEDURE — 85097 BONE MARROW INTERPRETATION: CPT | Mod: HCNC,NTX,, | Performed by: PATHOLOGY

## 2020-06-22 PROCEDURE — 88365 INSITU HYBRIDIZATION (FISH): CPT | Mod: HCNC,NTX | Performed by: PATHOLOGY

## 2020-06-22 PROCEDURE — 88311 PR  DECALCIFY TISSUE: ICD-10-PCS | Mod: 26,HCNC,NTX, | Performed by: PATHOLOGY

## 2020-06-22 RX ORDER — FENTANYL CITRATE 50 UG/ML
INJECTION, SOLUTION INTRAMUSCULAR; INTRAVENOUS CODE/TRAUMA/SEDATION MEDICATION
Status: COMPLETED | OUTPATIENT
Start: 2020-06-22 | End: 2020-06-22

## 2020-06-22 RX ORDER — MIDAZOLAM HYDROCHLORIDE 1 MG/ML
INJECTION INTRAMUSCULAR; INTRAVENOUS CODE/TRAUMA/SEDATION MEDICATION
Status: COMPLETED | OUTPATIENT
Start: 2020-06-22 | End: 2020-06-22

## 2020-06-22 RX ADMIN — FENTANYL CITRATE 50 MCG: 50 INJECTION, SOLUTION INTRAMUSCULAR; INTRAVENOUS at 10:06

## 2020-06-22 RX ADMIN — MIDAZOLAM HYDROCHLORIDE 1 MG: 1 INJECTION, SOLUTION INTRAMUSCULAR; INTRAVENOUS at 10:06

## 2020-06-22 NOTE — DISCHARGE SUMMARY
Pre Op Diagnosis: Anemia     Post Op Diagnosis: same     Procedure:  Bone marrow biopsy     Procedure performed by: Miley PARKS, Teena LANGSTON     Written Informed Consent Obtained: Yes     Specimen Removed:  yes     Estimated Blood Loss:  minimal     Findings: Local anesthesia and moderate sedation were used.     The patient tolerated the procedure well and there were no complications.      Sterile technique was performed in the posterior right iliac, lidocaine was used as a local anesthetic.  Multiple samples taken from the right iliac.  Pt tolerated the procedure well without immediate complications.  Please see radiologist report for details. F/u with PCP and/or ordering physician.

## 2020-06-22 NOTE — SEDATION DOCUMENTATION
Patient brought into CT, placed on table prone with bilateral arms above head.  CM in place.  VSS.  Patient verbalizes understanding of procedure

## 2020-06-22 NOTE — DISCHARGE INSTRUCTIONS
Bone Marrow Aspiration and Biopsy  Does this test have other names?  Bone marrow exam  What is this test?  This is a two-part test that looks at the blood cells in a sample of bone marrow, the spongy tissue within certain bones. This test may help your healthcare provider diagnose or monitor a blood disease or health condition affecting your marrow.  Your bone marrow has a liquid part and a solid part. Aspiration uses a needle to remove a sample of the liquid part of bone marrow. Biopsy uses a larger needle to remove a small amount of bone with its marrow.  Part of the job of bone marrow is to make blood cells. This test can find out how well your bone marrow is working. This test is also done to find some types of cancer.  Why do I need this test?  You might have this test if your healthcare provider wants to find out the health of your bone marrow or to check on how well your marrow is making blood cells.  You may have an aspiration to check for:  · The health of your bone marrow for a transplant  · Acute leukemia  · Multiple myeloma  In some cases, bone marrow aspiration is used to confirm chromosome disorders in newborns.  You may have an aspiration followed by a biopsy if you could have:  · Bacterial, fungal, or parasitic infection  · Unexplained anemia, leucopenia, or thrombocytopenia  · Metastatic cancer or many other diseases  What other tests might I have along with this test?  Your healthcare provider may also order these tests:  · Complete blood count, or CBC  · Reticulocyte count to find out your red blood cell survival rate  What do my test results mean?  Many things may affect your lab test results. These include the method each lab uses to do the test. Even if your test results are different from the normal value, you may not have a problem. To learn what the results mean for you, talk with your healthcare provider.  The lab will look at different aspects of your bone marrow to help find certain  diseases or conditions. These aspects include:  · Type and number of blood cells  · Any abnormalities in the size, shape, or look of cells  · Level of iron in the bone marrow  · Abnormal amount of young white blood cells, called blasts  · Any chromosomal abnormalities  Depending on what is seen, your results may mean you have an infection, a blood disease, leukemia, or cancer that has spread to the bone marrow from another site.  Your healthcare provider will take your results and combine this information with information from your physical exam, health history, and other types of tests to make a diagnosis.   If your results are negative, your provider may order other tests to diagnose your condition.   How is this test done?  These tests require a sample of bone marrow. A number of sites on your body can be used for marrow aspiration, but the hip bone is a common spot. You will likely lie on your side or stomach on an exam table. Your healthcare provider will numb the area of the test. You may feel a slight prick from the needle that the provider uses to give the numbing agent.  Does this test pose any risks?  It's not possible to numb the bone, so you may feel slight pain during the procedure. But you shouldn't feel any pain afterward. Risks from a bone marrow test are rare, but you could have bleeding or an infection.  What might affect my test results?  Other factors aren't likely to affect your results.  How do I get ready for this test?  Tell your healthcare provider if you take aspirin or have any allergies. Also tell your provider if you are pregnant, take any blood-thinner medicines, or have a history of bleeding problems.  Be sure your provider knows about all other medicines, herbs, vitamins, and supplements you are taking. This includes medicines that don't need a prescription and any illicit drugs you may use.     © 6703-3110 [employer] [address] [place] [address], [place] [address]. All  rights reserved. This information is not intended as a substitute for professional medical care. Always follow your healthcare professional's instructions.        Recovery After Procedural Sedation (Adult)   You have been given medicine by vein to make you sleep during your surgery. This may have included both a pain medicine and sleeping medicine. Most of the effects have worn off. But you may still have some drowsiness for the next 6 to 8 hours.  Home care  Follow these guidelines when you get home:  · For the next 8 hours, you should be watched by a responsible adult. This person should make sure your condition is not getting worse.  · Don't drink any alcohol for the next 24 hours.  · Don't drive, operate dangerous machinery, or make important business or personal decisions during the next 24 hours.  · To prevent injury or falls, use caution when standing and walking for at least 24 hours after your procedure.  Note: Your healthcare provider may tell you not to take any medicine by mouth for pain or sleep in the next 4 hours. These medicines may react with the medicines you were given in the hospital. This could cause a much stronger response than usual.  Follow-up care  Follow up with your healthcare provider if you are not alert and back to your usual level of activity within 12 hours.  When to seek medical advice  Call your healthcare provider right away if any of these occur:  · Drowsiness gets worse  · Weakness or dizziness gets worse  · Repeated vomiting  · You can't be awakened  · Fever  · New rash  [employer] last reviewed this educational content on 9/1/2019  © 3128-5441 [employer]. [address] [place] [address], [place] [address]. All rights reserved. This information is not intended as a substitute for professional medical care. Always follow your healthcare professional's instructions.

## 2020-06-22 NOTE — PLAN OF CARE
Band aid to right posterior iliac crest puncture site C/D/I with no bleeding/redness/swelling noted. Discharge instructions given to and reviewed with pt and pt verbalized understanding of all. Pt discharged to home, taken out via wheelchair and driven home by daughter. Pt informed to follow up with ordering doctor for biopsy results.

## 2020-06-23 ENCOUNTER — TELEPHONE (OUTPATIENT)
Dept: FAMILY MEDICINE | Facility: CLINIC | Age: 75
End: 2020-06-23

## 2020-06-23 ENCOUNTER — NURSE TRIAGE (OUTPATIENT)
Dept: ADMINISTRATIVE | Facility: CLINIC | Age: 75
End: 2020-06-23

## 2020-06-23 NOTE — TELEPHONE ENCOUNTER
----- Message from Avril España sent at 6/23/2020  4:09 PM CDT -----  Type:  Needs Medical Advice    Who Called:  Pt daughter (Elizabeth)   Symptoms (please be specific):  States pt is getting leg cramps again//pt is in a lot of pain//they had left a message and did not get a response   How long has patient had these symptoms:     Pharmacy name and phone #:   [facility] on [address]  Would the patient rather a call back or a response via [facility]?    Call back  Best Call Back Number:  [phone] or [phone]  Additional Information:   Pt is needing to come in for an appt asap//charla/james

## 2020-06-24 ENCOUNTER — TELEPHONE (OUTPATIENT)
Dept: FAMILY MEDICINE | Facility: CLINIC | Age: 75
End: 2020-06-24

## 2020-06-24 ENCOUNTER — OFFICE VISIT (OUTPATIENT)
Dept: FAMILY MEDICINE | Facility: CLINIC | Age: 75
End: 2020-06-24
Payer: MEDICARE

## 2020-06-24 VITALS
DIASTOLIC BLOOD PRESSURE: 60 MMHG | TEMPERATURE: 98 F | WEIGHT: 139.25 LBS | BODY MASS INDEX: 23.17 KG/M2 | OXYGEN SATURATION: 95 % | RESPIRATION RATE: 18 BRPM | HEART RATE: 71 BPM | SYSTOLIC BLOOD PRESSURE: 138 MMHG

## 2020-06-24 DIAGNOSIS — N18.5 TYPE 2 DM WITH CKD STAGE 5 AND HYPERTENSION: ICD-10-CM

## 2020-06-24 DIAGNOSIS — E11.22 TYPE 2 DM WITH CKD STAGE 5 AND HYPERTENSION: ICD-10-CM

## 2020-06-24 DIAGNOSIS — E11.42 DM TYPE 2 WITH DIABETIC PERIPHERAL NEUROPATHY: Primary | ICD-10-CM

## 2020-06-24 DIAGNOSIS — I12.0 TYPE 2 DM WITH CKD STAGE 5 AND HYPERTENSION: ICD-10-CM

## 2020-06-24 DIAGNOSIS — R25.2 CRAMPS OF LOWER EXTREMITY: ICD-10-CM

## 2020-06-24 LAB
BODY SITE - BONE MARROW: NORMAL
CLINICAL DIAGNOSIS - BONE MARROW: NORMAL
FLOW CYTOMETRY ANTIBODIES ANALYZED - BONE MARROW: NORMAL
FLOW CYTOMETRY COMMENT - BONE MARROW: NORMAL
FLOW CYTOMETRY INTERPRETATION - BONE MARROW: NORMAL

## 2020-06-24 PROCEDURE — 2024F 7 FLD RTA PHOTO EVC RTNOPTHY: CPT | Mod: HCNC,S$GLB,, | Performed by: FAMILY MEDICINE

## 2020-06-24 PROCEDURE — 1159F PR MEDICATION LIST DOCUMENTED IN MEDICAL RECORD: ICD-10-PCS | Mod: HCNC,S$GLB,, | Performed by: FAMILY MEDICINE

## 2020-06-24 PROCEDURE — 3046F HEMOGLOBIN A1C LEVEL >9.0%: CPT | Mod: HCNC,CPTII,S$GLB, | Performed by: FAMILY MEDICINE

## 2020-06-24 PROCEDURE — 3008F BODY MASS INDEX DOCD: CPT | Mod: HCNC,CPTII,S$GLB, | Performed by: FAMILY MEDICINE

## 2020-06-24 PROCEDURE — 3075F SYST BP GE 130 - 139MM HG: CPT | Mod: HCNC,CPTII,S$GLB, | Performed by: FAMILY MEDICINE

## 2020-06-24 PROCEDURE — 3008F PR BODY MASS INDEX (BMI) DOCUMENTED: ICD-10-PCS | Mod: HCNC,CPTII,S$GLB, | Performed by: FAMILY MEDICINE

## 2020-06-24 PROCEDURE — 1101F PR PT FALLS ASSESS DOC 0-1 FALLS W/OUT INJ PAST YR: ICD-10-PCS | Mod: HCNC,CPTII,S$GLB, | Performed by: FAMILY MEDICINE

## 2020-06-24 PROCEDURE — 3075F PR MOST RECENT SYSTOLIC BLOOD PRESS GE 130-139MM HG: ICD-10-PCS | Mod: HCNC,CPTII,S$GLB, | Performed by: FAMILY MEDICINE

## 2020-06-24 PROCEDURE — 99999 PR PBB SHADOW E&M-EST. PATIENT-LVL III: ICD-10-PCS | Mod: PBBFAC,HCNC,, | Performed by: FAMILY MEDICINE

## 2020-06-24 PROCEDURE — 3078F PR MOST RECENT DIASTOLIC BLOOD PRESSURE < 80 MM HG: ICD-10-PCS | Mod: HCNC,CPTII,S$GLB, | Performed by: FAMILY MEDICINE

## 2020-06-24 PROCEDURE — 99999 PR PBB SHADOW E&M-EST. PATIENT-LVL III: CPT | Mod: PBBFAC,HCNC,, | Performed by: FAMILY MEDICINE

## 2020-06-24 PROCEDURE — 1101F PT FALLS ASSESS-DOCD LE1/YR: CPT | Mod: HCNC,CPTII,S$GLB, | Performed by: FAMILY MEDICINE

## 2020-06-24 PROCEDURE — 3046F PR MOST RECENT HEMOGLOBIN A1C LEVEL > 9.0%: ICD-10-PCS | Mod: HCNC,CPTII,S$GLB, | Performed by: FAMILY MEDICINE

## 2020-06-24 PROCEDURE — 1125F PR PAIN SEVERITY QUANTIFIED, PAIN PRESENT: ICD-10-PCS | Mod: HCNC,S$GLB,, | Performed by: FAMILY MEDICINE

## 2020-06-24 PROCEDURE — 1159F MED LIST DOCD IN RCRD: CPT | Mod: HCNC,S$GLB,, | Performed by: FAMILY MEDICINE

## 2020-06-24 PROCEDURE — 2024F PR 7 FIELD PHOTOS WITH INTERP/ REVIEW: ICD-10-PCS | Mod: HCNC,S$GLB,, | Performed by: FAMILY MEDICINE

## 2020-06-24 PROCEDURE — 3078F DIAST BP <80 MM HG: CPT | Mod: HCNC,CPTII,S$GLB, | Performed by: FAMILY MEDICINE

## 2020-06-24 PROCEDURE — 99214 OFFICE O/P EST MOD 30 MIN: CPT | Mod: HCNC,S$GLB,, | Performed by: FAMILY MEDICINE

## 2020-06-24 PROCEDURE — 1125F AMNT PAIN NOTED PAIN PRSNT: CPT | Mod: HCNC,S$GLB,, | Performed by: FAMILY MEDICINE

## 2020-06-24 PROCEDURE — 99214 PR OFFICE/OUTPT VISIT, EST, LEVL IV, 30-39 MIN: ICD-10-PCS | Mod: HCNC,S$GLB,, | Performed by: FAMILY MEDICINE

## 2020-06-24 RX ORDER — GABAPENTIN 300 MG/1
300 CAPSULE ORAL NIGHTLY
Qty: 90 CAPSULE | Refills: 1 | Status: SHIPPED | OUTPATIENT
Start: 2020-06-24 | End: 2020-10-29

## 2020-06-24 NOTE — PATIENT INSTRUCTIONS
Vit B complex and Vit E supplement can help with cramps  Treating Peripheral Neuropathy  Peripheral neuropathy is a disease of the nerves. It most often starts in your feet and may also eventually affect the arms. It may cause pain or may make you unable to sense pain. Sometimes, weakness occurs as well. Lack of pain and weakness makes you more likely to injure yourself without knowing it.  Learn ways to protect your feet. Check your feet daily for wounds you may not have felt. Avoid burns by testing bath water with your elbow before stepping in. Also, always wear shoes to prevent injury.    Regular foot care  If you have foot numbness, you may not notice cutting yourself while trimming your nails. To prevent problems, your doctor may ask you to visit for nail and callus trimming. See your doctor for foot care as often as suggested.  Check your feet daily  Catch problems early by checking your feet every day for changes. Look at the top and bottom of your feet, your heels, and between your toes. It may help to use a mirror. If this is hard, ask someone to check for you. Call your doctor if you notice a wound, ulceration, ingrown nail, or any changes in your feet. This includes increased heat, swelling, and redness.  Wear proper footwear  Always wear shoes and socks, even indoors. Ask your doctor how to choose the right shoe. After buying shoes, bring them to your doctor to be checked for fit. Take new shoes off every hour or so to check for red pressure areas on your feet. Each time you put on your shoes, use your fingers first to feel inside for foreign objects.  Common causes of peripheral neuropathy  Some common causes of peripheral neuropathy include:  · Diabetes or other endocrine disorders  · Toxins (such as alcohol)  · Nutritional deficiencies (such as Vitamin B-12)  · Kidney disease  · Injury  · Repetitive stress (such as carpal tunnel syndrome)  · Autoimmune disease  · Cancer and  tumors  · Infection  Diagnosis and treatment  Diagnosis of peripheral neuropathy includes a complete history and physical exam.  Lab tests including blood work and imaging often help determine the cause.  Special nerve tests are often helpful including nerve conduction velocity studies (NCV), and electromyelography (EMG).  Treatment focuses on treating the underlying disorder and treating symptoms through the use of medicines, injections, TENS (transcutaneous electrical nerve stimulation), acupuncture, massage, and other methods.  Date Last Reviewed: 10/19/2015  © 3705-8367 [employer]. [address] [place] [address]. All rights reserved. This information is not intended as a substitute for professional medical care. Always follow your healthcare professional's instructions.        Muscle Spasm  A muscle spasm is a sudden tightening of the muscle you cant control. This may be caused by strain, overworking the muscle, or injury. It can also be caused by dehydration, electrolyte imbalance, diabetes, alcohol use, and certain medicines. If it goes on long enough the muscle spasm causes pain. Common areas for muscle spasm are the legs, neck, and back.  Home care  · Heat, massage, and stretching will help relax muscle spasm.  · When the spasm is in your arm or leg, stretch the muscle passively. To do this, have someone bend or straighten the joint above or below the muscle until you feel the stretch on the sore muscle. You can stretch the muscle actively by moving the affected body part. This will stretch the muscle that is in spasm. For example, if the spasm is in your calf, bend the ankle so your toes point upward toward your knee. This will stretch your calf muscle.  · You may use over-the-counter pain medicine to control pain, unless another medicine was prescribed. If you have chronic liver or kidney disease or ever had a stomach ulcer or GI bleeding, talk with your healthcare provider  before using these medicines.  Follow-up care  Follow up with your healthcare provider, or as advised.    When to seek medical advice  Call your healthcare provider right away if any of the following occur:  · Fingers or toes become swollen, cold, blue, numb, or tingly  · You develop weakness in the affected arm or leg  · Pain increases and is not controlled by the above measures  Date Last Reviewed: 11/21/2015  © 1792-4048 [employer]. [address] [place] [address], [place] [address]. All rights reserved. This information is not intended as a substitute for professional medical care. Always follow your healthcare professional's instructions.

## 2020-06-24 NOTE — TELEPHONE ENCOUNTER
S/w pt's daughter.  Reports that pt has been having severe leg cramps for several days.  Pt has appt with a provider in [place] today.  Instructed to keep that appt and please keep us updated.  Verbalized understanding./rpr

## 2020-06-24 NOTE — PROGRESS NOTES
Subjective:       Patient ID: Avril Monzon is a 75 y.o. female.    Chief Complaint: Follow-up (generalized body cramps)      History of Present Illness:   Avril Monzon 75 y.o. female presents today with  Generalized cramps to legs and arms and body. Intermittent, worse at night.  Present for years. Worsening in frequency.   Also with peripheral neuropathy to the bilateral legs.  She has Uncontrolled DM II and CKD 5 and on torsemide.     Past Medical History:   Diagnosis Date    Acute hypoxemic respiratory failure 11/26/2018    Anemia     Arthritis     back, hand, knees    Back pain     Cataract     CKD stage G4/A3, GFR 15 - 29 and albumin creatinine ratio >300 mg/g     GFR 40% Jan 2014 and 33% ub 3/2014 ([facility])    Coronary artery disease involving native coronary artery of native heart 11/30/2018    Diabetic retinopathy     DM (diabetes mellitus) type II controlled, neurological manifestation     Exudative age-related macular degeneration of left eye with active choroidal neovascularization 2/5/2019    GERD (gastroesophageal reflux disease)     Glaucoma     Heart failure     Hyperlipidemia     Hypertension     Non-ST elevation MI (NSTEMI) 11/28/2018    NSTEMI (non-ST elevated myocardial infarction) 11/27/2018    Peripheral neuropathy     Polyneuropathy     Proteinuria     >6 mo     Seizures     [facility] 1/2014 -dick CT NRI showed small vessel    Stroke 2012,2014    Tobacco dependence     Type 2 diabetes with peripheral circulatory disorder, controlled      Family History   Problem Relation Age of Onset    Diabetes Mother     Hyperlipidemia Mother     Hypertension Mother     Heart disease Mother         MI    Muscular dystrophy Son     Cancer Maternal Grandmother         colon     Social History     Socioeconomic History    Marital status:      Spouse name: Not on file    Number of children: Not on file    Years of education: Not on file    Highest education level: Not on file    Occupational History    Not on file   Social Needs    Financial resource strain: Not on file    Food insecurity     Worry: Not on file     Inability: Not on file    Transportation needs     Medical: Not on file     Non-medical: Not on file   Tobacco Use    Smoking status: Former Smoker     Packs/day: 1.50     Years: 30.00     Pack years: 45.00     Types: Cigarettes     Quit date: 1990     Years since quittin.4    Smokeless tobacco: Former User   Substance and Sexual Activity    Alcohol use: No     Alcohol/week: 0.0 standard drinks    Drug use: No    Sexual activity: Never   Lifestyle    Physical activity     Days per week: Not on file     Minutes per session: Not on file    Stress: Not at all   Relationships    Social connections     Talks on phone: Not on file     Gets together: Not on file     Attends Church service: Not on file     Active member of club or organization: Not on file     Attends meetings of clubs or organizations: Not on file     Relationship status: Not on file   Other Topics Concern    Not on file   Social History Narrative    Not on file     Outpatient Encounter Medications as of 2020   Medication Sig Dispense Refill    ACCU-CHEK ARDEN PLUS METER Mis USE TO TEST TWICE DAILY  0    amLODIPine (NORVASC) 5 MG tablet Take 5 mg by mouth once daily.      aspirin (ECOTRIN) 81 MG EC tablet Take 1 tablet (81 mg total) by mouth once daily.  0    atorvastatin (LIPITOR) 80 MG tablet Take 1 tablet (80 mg total) by mouth once daily. (Patient taking differently: Take 40 mg by mouth once daily. ) 30 tablet 11    BIDIL 20-37.5 mg Tab TAKE 2 TABLETS BY MOUTH THREE TIMES DAILY 180 tablet 11    blood sugar diagnostic Strp 1 strip by Misc.(Non-Drug; Combo Route) route 3 (three) times daily. relion ultima 100 strip 11    calcitRIOL (ROCALTROL) 0.25 MCG Cap Take 1 capsule by mouth once daily 30 capsule 9    clopidogrel (PLAVIX) 75 mg tablet TAKE 1 TABLET BY MOUTH ONCE DAILY 39  tablet 6    fluticasone propionate (FLONASE) 50 mcg/actuation nasal spray 1 spray (50 mcg total) by Each Nostril route once daily. 16 g 0    furosemide (LASIX) 20 MG tablet       insulin NPH-insulin regular, 70/30, (NOVOLIN 70/30) 100 unit/mL (70-30) injection INJECT SUBCUTANEOUSLY 30 UNITS IN THE MORNING AND INJECT 25 UNITS IN THE EVENING (Patient taking differently: INJECT SUBCUTANEOUSLY 30 UNITS IN THE MORNING AND INJECT 25 UNITS IN THE EVENING) 2 vial 3    lancets (ACCU-CHEK SOFTCLIX LANCETS) Misc 1 lancet by Misc.(Non-Drug; Combo Route) route 3 (three) times daily. 100 each 11    loratadine (CLARITIN) 10 mg tablet Take 1 tablet (10 mg total) by mouth once daily. 30 tablet 0    metoprolol tartrate (LOPRESSOR) 25 MG tablet TAKE 1 TABLET BY MOUTH TWICE DAILY 60 tablet 6    nitroGLYCERIN (NITROSTAT) 0.4 MG SL tablet Place 1 tablet (0.4 mg total) under the tongue every 5 (five) minutes as needed for Chest pain. 20 tablet 0    ondansetron (ZOFRAN-ODT) 4 MG TbDL DISSOLVE 1 TABLET IN MOUTH THREE TIMES DAILY AS NEEDED FOR NAUSEA FOR 5 DAYS  0    sodium bicarbonate 325 MG tablet TAKE 1 TABLET BY MOUTH THREE TIMES DAILY 90 tablet 11    torsemide (DEMADEX) 20 MG Tab Take 2 tablets (40 mg total) by mouth once daily. 180 tablet 3    gabapentin (NEURONTIN) 300 MG capsule Take 1 capsule (300 mg total) by mouth every evening. 90 capsule 1     Facility-Administered Encounter Medications as of 6/24/2020   Medication Dose Route Frequency Provider Last Rate Last Dose    aflibercept Soln 2 mg  2 mg Intravitreal 1 time in Clinic/HOD JADIEL Coello MD           Review of Systems   Constitutional: Negative for chills and fever.   HENT: Negative for congestion and facial swelling.    Eyes: Negative for discharge and itching.   Respiratory: Negative for cough and wheezing.    Cardiovascular: Negative for chest pain and palpitations.   Gastrointestinal: Negative for abdominal pain, nausea and vomiting.   Endocrine: Negative  for cold intolerance and heat intolerance.   Genitourinary: Negative for dysuria and flank pain.   Musculoskeletal: Negative for myalgias and neck stiffness.   Skin: Negative for pallor and wound.   Neurological: Negative for facial asymmetry and weakness.   Psychiatric/Behavioral: Negative for agitation and suicidal ideas.       Objective:      /60 (BP Location: Right arm, Patient Position: Sitting, BP Method: Large (Manual))   Pulse 71   Temp 98 °F (36.7 °C) (Oral)   Resp 18   Wt 63.2 kg (139 lb 3.5 oz)   LMP  (LMP Unknown)   SpO2 95%   BMI 23.17 kg/m²   Physical Exam  Vitals signs and nursing note reviewed.   Constitutional:       General: She is not in acute distress.     Appearance: She is well-developed.   HENT:      Head: Normocephalic and atraumatic.      Right Ear: External ear normal.      Left Ear: External ear normal.   Eyes:      Conjunctiva/sclera: Conjunctivae normal.   Neck:      Musculoskeletal: Neck supple.      Thyroid: No thyromegaly.   Cardiovascular:      Rate and Rhythm: Normal rate and regular rhythm.   Pulmonary:      Effort: Pulmonary effort is normal. No respiratory distress.   Abdominal:      General: There is no distension.      Palpations: Abdomen is soft.   Genitourinary:     Comments: deferred  Musculoskeletal:         General: No deformity.   Feet:      Right foot:      Protective Sensation: 9 sites tested. 6 sites sensed.      Left foot:      Protective Sensation: 9 sites tested. 6 sites sensed.   Lymphadenopathy:      Head:      Right side of head: No submandibular adenopathy.      Left side of head: No submandibular adenopathy.      Cervical: No cervical adenopathy.   Skin:     General: Skin is warm and dry.   Neurological:      Mental Status: She is alert and oriented to person, place, and time.   Psychiatric:         Behavior: Behavior normal.         Results for orders placed or performed during the hospital encounter of 06/22/20   Leukemia/Lymphoma Screen - Bone  Marrow Right Posterior Iliac Crest   Result Value Ref Range    Clinical Diagnosis - Bone Marrow ANM     Body Site - Bone Marrow RPI    Chromosome Analysis, Bone Marrow Right Posterior Iliac Crest   Result Value Ref Range    Reason for Referral Anemia; stg 5 CKD    Myelodysplastic Syndrome (MDS), FISH, Bone Marrow   Result Value Ref Range    MDS FISH Reason for Referral (BM) anemia; stg 5 CKD     MDS FISH Specimen Source Bone Marrow      Assessment:       1. DM type 2 with diabetic peripheral neuropathy    2. Cramps of lower extremity    3. Type 2 DM with CKD stage 5 and hypertension        Plan:     Muscle cramps due to electrolyte imbalance due to diuretic, cannot take oral potassium due to esrd.  She can have up to 300 mg gabapentin at bedtime, vit E and vit B complex will help too.  DM type 2 with diabetic peripheral neuropathy  -     gabapentin (NEURONTIN) 300 MG capsule; Take 1 capsule (300 mg total) by mouth every evening.  Dispense: 90 capsule; Refill: 1    Cramps of lower extremity  -     gabapentin (NEURONTIN) 300 MG capsule; Take 1 capsule (300 mg total) by mouth every evening.  Dispense: 90 capsule; Refill: 1    Type 2 DM with CKD stage 5 and hypertension

## 2020-06-24 NOTE — TELEPHONE ENCOUNTER
----- Message from Gina Zimmerman sent at 6/24/2020  9:20 AM CDT -----  Contact: daughter(Elizabeth)-[phone]  Type:  Patient Returning Call    Who Called:Elizabeth  Who Left Message for Patient:Isabella  Does the patient know what this is regarding?:appt  Would the patient rather a call back or a response via [facility]? Call back   Best Call Back Number:[phone]  Additional Information:

## 2020-06-24 NOTE — TELEPHONE ENCOUNTER
Daughter states pt has been having off and on leg pains and spasm for a while now, they want to know what they can do to stop it, she has an appointment scheduled for tomorrow, admits that the symptoms have subsided at this time, advised her to keep appt tomorrow and to call back for any worsening, caller agreed     Reason for Disposition   [1] MODERATE pain (e.g., interferes with normal activities, limping) AND [2] present > 3 days    Protocols used: LEG PAIN-A-AH

## 2020-07-01 ENCOUNTER — OFFICE VISIT (OUTPATIENT)
Dept: HEMATOLOGY/ONCOLOGY | Facility: CLINIC | Age: 75
DRG: 291 | End: 2020-07-01
Payer: MEDICARE

## 2020-07-01 ENCOUNTER — HOSPITAL ENCOUNTER (INPATIENT)
Facility: HOSPITAL | Age: 75
LOS: 3 days | Discharge: HOME | DRG: 291 | End: 2020-07-04
Attending: EMERGENCY MEDICINE | Admitting: INTERNAL MEDICINE
Payer: MEDICARE

## 2020-07-01 ENCOUNTER — INFUSION (OUTPATIENT)
Dept: INFUSION THERAPY | Facility: HOSPITAL | Age: 75
End: 2020-07-01
Attending: INTERNAL MEDICINE
Payer: MEDICARE

## 2020-07-01 VITALS
TEMPERATURE: 98 F | RESPIRATION RATE: 18 BRPM | WEIGHT: 146.19 LBS | OXYGEN SATURATION: 96 % | HEART RATE: 68 BPM | HEIGHT: 66 IN | SYSTOLIC BLOOD PRESSURE: 174 MMHG | BODY MASS INDEX: 23.49 KG/M2 | DIASTOLIC BLOOD PRESSURE: 69 MMHG

## 2020-07-01 VITALS
RESPIRATION RATE: 18 BRPM | DIASTOLIC BLOOD PRESSURE: 71 MMHG | SYSTOLIC BLOOD PRESSURE: 196 MMHG | HEART RATE: 67 BPM | OXYGEN SATURATION: 98 % | TEMPERATURE: 98 F

## 2020-07-01 DIAGNOSIS — I50.9 CHF (CONGESTIVE HEART FAILURE): ICD-10-CM

## 2020-07-01 DIAGNOSIS — I50.9 ACUTE CONGESTIVE HEART FAILURE, UNSPECIFIED HEART FAILURE TYPE: Primary | ICD-10-CM

## 2020-07-01 DIAGNOSIS — I16.1 HYPERTENSIVE EMERGENCY: ICD-10-CM

## 2020-07-01 DIAGNOSIS — I50.43 ACUTE ON CHRONIC COMBINED SYSTOLIC AND DIASTOLIC CONGESTIVE HEART FAILURE: ICD-10-CM

## 2020-07-01 DIAGNOSIS — E11.59 HYPERTENSION ASSOCIATED WITH DIABETES: ICD-10-CM

## 2020-07-01 DIAGNOSIS — Z79.4 TYPE 2 DIABETES MELLITUS WITH STABLE PROLIFERATIVE RETINOPATHY OF BOTH EYES, WITH LONG-TERM CURRENT USE OF INSULIN: ICD-10-CM

## 2020-07-01 DIAGNOSIS — D50.0 IRON DEFICIENCY ANEMIA DUE TO CHRONIC BLOOD LOSS: ICD-10-CM

## 2020-07-01 DIAGNOSIS — D63.1 ANEMIA DUE TO STAGE 5 CHRONIC KIDNEY DISEASE: Primary | ICD-10-CM

## 2020-07-01 DIAGNOSIS — N18.5 STAGE 5 CHRONIC KIDNEY DISEASE: ICD-10-CM

## 2020-07-01 DIAGNOSIS — D63.1 ANEMIA IN STAGE 4 CHRONIC KIDNEY DISEASE: ICD-10-CM

## 2020-07-01 DIAGNOSIS — I15.2 HYPERTENSION ASSOCIATED WITH DIABETES: ICD-10-CM

## 2020-07-01 DIAGNOSIS — R73.9 HYPERGLYCEMIA: ICD-10-CM

## 2020-07-01 DIAGNOSIS — E11.3553 TYPE 2 DIABETES MELLITUS WITH STABLE PROLIFERATIVE RETINOPATHY OF BOTH EYES, WITH LONG-TERM CURRENT USE OF INSULIN: ICD-10-CM

## 2020-07-01 DIAGNOSIS — I36.1 NON-RHEUMATIC TRICUSPID VALVE INSUFFICIENCY: ICD-10-CM

## 2020-07-01 DIAGNOSIS — I34.0 NON-RHEUMATIC MITRAL REGURGITATION: ICD-10-CM

## 2020-07-01 DIAGNOSIS — N18.5 ANEMIA DUE TO STAGE 5 CHRONIC KIDNEY DISEASE: Primary | ICD-10-CM

## 2020-07-01 DIAGNOSIS — N18.4 ANEMIA IN STAGE 4 CHRONIC KIDNEY DISEASE: ICD-10-CM

## 2020-07-01 DIAGNOSIS — N18.4 CHRONIC KIDNEY DISEASE (CKD) STAGE G4/A3, SEVERELY DECREASED GLOMERULAR FILTRATION RATE (GFR) BETWEEN 15-29 ML/MIN/1.73 SQUARE METER AND ALBUMINURIA CREATININE RATIO GREATER THAN 300 MG/G: ICD-10-CM

## 2020-07-01 DIAGNOSIS — J81.0 ACUTE PULMONARY EDEMA: ICD-10-CM

## 2020-07-01 LAB
ALBUMIN SERPL BCP-MCNC: 3.8 G/DL (ref 3.5–5.2)
ALP SERPL-CCNC: 87 U/L (ref 55–135)
ALT SERPL W/O P-5'-P-CCNC: 138 U/L (ref 10–44)
ANION GAP SERPL CALC-SCNC: 12 MMOL/L (ref 8–16)
AST SERPL-CCNC: 118 U/L (ref 10–40)
BACTERIA #/AREA URNS HPF: ABNORMAL /HPF
BACTERIA #/AREA URNS HPF: ABNORMAL /HPF
BASOPHILS # BLD AUTO: 0.02 K/UL (ref 0–0.2)
BASOPHILS NFR BLD: 0.3 % (ref 0–1.9)
BILIRUB SERPL-MCNC: 0.3 MG/DL (ref 0.1–1)
BILIRUB UR QL STRIP: NEGATIVE
BILIRUB UR QL STRIP: NEGATIVE
BNP SERPL-MCNC: 1130 PG/ML (ref 0–99)
BUN SERPL-MCNC: 88 MG/DL (ref 8–23)
CALCIUM SERPL-MCNC: 8.8 MG/DL (ref 8.7–10.5)
CHLORIDE SERPL-SCNC: 107 MMOL/L (ref 95–110)
CLARITY UR: CLEAR
CLARITY UR: CLEAR
CO2 SERPL-SCNC: 20 MMOL/L (ref 23–29)
COLOR UR: YELLOW
COLOR UR: YELLOW
CREAT SERPL-MCNC: 5 MG/DL (ref 0.5–1.4)
DIFFERENTIAL METHOD: ABNORMAL
EOSINOPHIL # BLD AUTO: 0 K/UL (ref 0–0.5)
EOSINOPHIL NFR BLD: 0.3 % (ref 0–8)
ERYTHROCYTE [DISTWIDTH] IN BLOOD BY AUTOMATED COUNT: 12.7 % (ref 11.5–14.5)
EST. GFR  (AFRICAN AMERICAN): 9 ML/MIN/1.73 M^2
EST. GFR  (NON AFRICAN AMERICAN): 8 ML/MIN/1.73 M^2
GLUCOSE SERPL-MCNC: 334 MG/DL (ref 70–110)
GLUCOSE UR QL STRIP: ABNORMAL
GLUCOSE UR QL STRIP: ABNORMAL
HCT VFR BLD AUTO: 32 % (ref 37–48.5)
HGB BLD-MCNC: 9.7 G/DL (ref 12–16)
HGB UR QL STRIP: ABNORMAL
HGB UR QL STRIP: ABNORMAL
HYALINE CASTS #/AREA URNS LPF: 0 /LPF
HYALINE CASTS #/AREA URNS LPF: 0 /LPF
IMM GRANULOCYTES # BLD AUTO: 0.01 K/UL (ref 0–0.04)
IMM GRANULOCYTES NFR BLD AUTO: 0.2 % (ref 0–0.5)
KETONES UR QL STRIP: NEGATIVE
KETONES UR QL STRIP: NEGATIVE
LEUKOCYTE ESTERASE UR QL STRIP: ABNORMAL
LEUKOCYTE ESTERASE UR QL STRIP: NEGATIVE
LYMPHOCYTES # BLD AUTO: 0.4 K/UL (ref 1–4.8)
LYMPHOCYTES NFR BLD: 7.5 % (ref 18–48)
MCH RBC QN AUTO: 29.9 PG (ref 27–31)
MCHC RBC AUTO-ENTMCNC: 30.3 G/DL (ref 32–36)
MCV RBC AUTO: 99 FL (ref 82–98)
MICROSCOPIC COMMENT: ABNORMAL
MICROSCOPIC COMMENT: ABNORMAL
MONOCYTES # BLD AUTO: 0.2 K/UL (ref 0.3–1)
MONOCYTES NFR BLD: 3.6 % (ref 4–15)
NEUTROPHILS # BLD AUTO: 5.1 K/UL (ref 1.8–7.7)
NEUTROPHILS NFR BLD: 88.1 % (ref 38–73)
NITRITE UR QL STRIP: POSITIVE
NITRITE UR QL STRIP: POSITIVE
NRBC BLD-RTO: 0 /100 WBC
PH UR STRIP: 6 [PH] (ref 5–8)
PH UR STRIP: 6 [PH] (ref 5–8)
PLATELET # BLD AUTO: 194 K/UL (ref 150–350)
PMV BLD AUTO: 10.3 FL (ref 9.2–12.9)
POCT GLUCOSE: 309 MG/DL (ref 70–110)
POTASSIUM SERPL-SCNC: 4.8 MMOL/L (ref 3.5–5.1)
PROT SERPL-MCNC: 6.7 G/DL (ref 6–8.4)
PROT UR QL STRIP: ABNORMAL
PROT UR QL STRIP: ABNORMAL
RBC # BLD AUTO: 3.24 M/UL (ref 4–5.4)
RBC #/AREA URNS HPF: 0 /HPF (ref 0–4)
RBC #/AREA URNS HPF: 0 /HPF (ref 0–4)
SARS-COV-2 RDRP RESP QL NAA+PROBE: NEGATIVE
SODIUM SERPL-SCNC: 139 MMOL/L (ref 136–145)
SP GR UR STRIP: 1.01 (ref 1–1.03)
SP GR UR STRIP: 1.02 (ref 1–1.03)
TROPONIN I SERPL DL<=0.01 NG/ML-MCNC: 0.03 NG/ML (ref 0–0.03)
TROPONIN I SERPL DL<=0.01 NG/ML-MCNC: 0.04 NG/ML (ref 0–0.03)
URN SPEC COLLECT METH UR: ABNORMAL
URN SPEC COLLECT METH UR: ABNORMAL
UROBILINOGEN UR STRIP-ACNC: NEGATIVE EU/DL
UROBILINOGEN UR STRIP-ACNC: NEGATIVE EU/DL
WBC # BLD AUTO: 5.83 K/UL (ref 3.9–12.7)
WBC #/AREA URNS HPF: 5 /HPF (ref 0–5)
WBC #/AREA URNS HPF: 5 /HPF (ref 0–5)
WBC CLUMPS URNS QL MICRO: ABNORMAL
YEAST URNS QL MICRO: ABNORMAL

## 2020-07-01 PROCEDURE — 93005 ELECTROCARDIOGRAM TRACING: CPT | Mod: HCNC,NTX

## 2020-07-01 PROCEDURE — 84484 ASSAY OF TROPONIN QUANT: CPT | Mod: 91,HCNC,NTX

## 2020-07-01 PROCEDURE — 1126F PR PAIN SEVERITY QUANTIFIED, NO PAIN PRESENT: ICD-10-PCS | Mod: HCNC,S$GLB,, | Performed by: INTERNAL MEDICINE

## 2020-07-01 PROCEDURE — 1126F AMNT PAIN NOTED NONE PRSNT: CPT | Mod: HCNC,S$GLB,, | Performed by: INTERNAL MEDICINE

## 2020-07-01 PROCEDURE — 1159F PR MEDICATION LIST DOCUMENTED IN MEDICAL RECORD: ICD-10-PCS | Mod: HCNC,S$GLB,, | Performed by: INTERNAL MEDICINE

## 2020-07-01 PROCEDURE — 96372 THER/PROPH/DIAG INJ SC/IM: CPT | Mod: 59,HCNC,NTX

## 2020-07-01 PROCEDURE — 99999 PR PBB SHADOW E&M-EST. PATIENT-LVL IV: CPT | Mod: PBBFAC,HCNC,, | Performed by: INTERNAL MEDICINE

## 2020-07-01 PROCEDURE — 2024F 7 FLD RTA PHOTO EVC RTNOPTHY: CPT | Mod: HCNC,S$GLB,, | Performed by: INTERNAL MEDICINE

## 2020-07-01 PROCEDURE — 1101F PT FALLS ASSESS-DOCD LE1/YR: CPT | Mod: HCNC,CPTII,S$GLB, | Performed by: INTERNAL MEDICINE

## 2020-07-01 PROCEDURE — 85025 COMPLETE CBC W/AUTO DIFF WBC: CPT | Mod: HCNC,NTX

## 2020-07-01 PROCEDURE — 1159F MED LIST DOCD IN RCRD: CPT | Mod: HCNC,S$GLB,, | Performed by: INTERNAL MEDICINE

## 2020-07-01 PROCEDURE — 25000003 PHARM REV CODE 250: Mod: HCNC,NTX | Performed by: EMERGENCY MEDICINE

## 2020-07-01 PROCEDURE — 99214 PR OFFICE/OUTPT VISIT, EST, LEVL IV, 30-39 MIN: ICD-10-PCS | Mod: HCNC,S$GLB,, | Performed by: INTERNAL MEDICINE

## 2020-07-01 PROCEDURE — 1101F PR PT FALLS ASSESS DOC 0-1 FALLS W/OUT INJ PAST YR: ICD-10-PCS | Mod: HCNC,CPTII,S$GLB, | Performed by: INTERNAL MEDICINE

## 2020-07-01 PROCEDURE — 96375 TX/PRO/DX INJ NEW DRUG ADDON: CPT | Mod: HCNC,NTX

## 2020-07-01 PROCEDURE — 99499 UNLISTED E&M SERVICE: CPT | Mod: HCNC,S$GLB,, | Performed by: INTERNAL MEDICINE

## 2020-07-01 PROCEDURE — 3046F HEMOGLOBIN A1C LEVEL >9.0%: CPT | Mod: HCNC,CPTII,S$GLB, | Performed by: INTERNAL MEDICINE

## 2020-07-01 PROCEDURE — 63600175 PHARM REV CODE 636 W HCPCS: Mod: HCNC | Performed by: INTERNAL MEDICINE

## 2020-07-01 PROCEDURE — 84484 ASSAY OF TROPONIN QUANT: CPT | Mod: HCNC,NTX

## 2020-07-01 PROCEDURE — 11000001 HC ACUTE MED/SURG PRIVATE ROOM: Mod: HCNC,NTX

## 2020-07-01 PROCEDURE — 3008F PR BODY MASS INDEX (BMI) DOCUMENTED: ICD-10-PCS | Mod: HCNC,CPTII,S$GLB, | Performed by: INTERNAL MEDICINE

## 2020-07-01 PROCEDURE — 96366 THER/PROPH/DIAG IV INF ADDON: CPT | Mod: HCNC,NTX

## 2020-07-01 PROCEDURE — 25000003 PHARM REV CODE 250: Mod: HCNC,NTX

## 2020-07-01 PROCEDURE — 96365 THER/PROPH/DIAG IV INF INIT: CPT | Mod: HCNC,NTX

## 2020-07-01 PROCEDURE — 81000 URINALYSIS NONAUTO W/SCOPE: CPT | Mod: HCNC,NTX

## 2020-07-01 PROCEDURE — 27000221 HC OXYGEN, UP TO 24 HOURS: Mod: HCNC,NTX

## 2020-07-01 PROCEDURE — 99499 RISK ADDL DX/OHS AUDIT: ICD-10-PCS | Mod: HCNC,S$GLB,, | Performed by: INTERNAL MEDICINE

## 2020-07-01 PROCEDURE — 96374 THER/PROPH/DIAG INJ IV PUSH: CPT | Mod: 59,HCNC,NTX

## 2020-07-01 PROCEDURE — 3078F PR MOST RECENT DIASTOLIC BLOOD PRESSURE < 80 MM HG: ICD-10-PCS | Mod: HCNC,CPTII,S$GLB, | Performed by: INTERNAL MEDICINE

## 2020-07-01 PROCEDURE — 63600175 PHARM REV CODE 636 W HCPCS: Mod: HCNC,NTX | Performed by: EMERGENCY MEDICINE

## 2020-07-01 PROCEDURE — 93010 EKG 12-LEAD: ICD-10-PCS | Mod: HCNC,NTX,, | Performed by: INTERNAL MEDICINE

## 2020-07-01 PROCEDURE — 63600175 PHARM REV CODE 636 W HCPCS: Mod: HCNC,NTX | Performed by: INTERNAL MEDICINE

## 2020-07-01 PROCEDURE — 27000190 HC CPAP FULL FACE MASK W/VALVE: Mod: HCNC,NTX

## 2020-07-01 PROCEDURE — 80053 COMPREHEN METABOLIC PANEL: CPT | Mod: HCNC,NTX

## 2020-07-01 PROCEDURE — 99214 OFFICE O/P EST MOD 30 MIN: CPT | Mod: HCNC,S$GLB,, | Performed by: INTERNAL MEDICINE

## 2020-07-01 PROCEDURE — 93010 ELECTROCARDIOGRAM REPORT: CPT | Mod: HCNC,NTX,, | Performed by: INTERNAL MEDICINE

## 2020-07-01 PROCEDURE — 3077F PR MOST RECENT SYSTOLIC BLOOD PRESSURE >= 140 MM HG: ICD-10-PCS | Mod: HCNC,CPTII,S$GLB, | Performed by: INTERNAL MEDICINE

## 2020-07-01 PROCEDURE — 81000 URINALYSIS NONAUTO W/SCOPE: CPT | Mod: 91,HCNC,NTX

## 2020-07-01 PROCEDURE — U0002 COVID-19 LAB TEST NON-CDC: HCPCS | Mod: HCNC,NTX

## 2020-07-01 PROCEDURE — 3008F BODY MASS INDEX DOCD: CPT | Mod: HCNC,CPTII,S$GLB, | Performed by: INTERNAL MEDICINE

## 2020-07-01 PROCEDURE — 82962 GLUCOSE BLOOD TEST: CPT | Mod: HCNC,NTX

## 2020-07-01 PROCEDURE — 2024F PR 7 FIELD PHOTOS WITH INTERP/ REVIEW: ICD-10-PCS | Mod: HCNC,S$GLB,, | Performed by: INTERNAL MEDICINE

## 2020-07-01 PROCEDURE — 3046F PR MOST RECENT HEMOGLOBIN A1C LEVEL > 9.0%: ICD-10-PCS | Mod: HCNC,CPTII,S$GLB, | Performed by: INTERNAL MEDICINE

## 2020-07-01 PROCEDURE — 99999 PR PBB SHADOW E&M-EST. PATIENT-LVL IV: ICD-10-PCS | Mod: PBBFAC,HCNC,, | Performed by: INTERNAL MEDICINE

## 2020-07-01 PROCEDURE — 3077F SYST BP >= 140 MM HG: CPT | Mod: HCNC,CPTII,S$GLB, | Performed by: INTERNAL MEDICINE

## 2020-07-01 PROCEDURE — 83880 ASSAY OF NATRIURETIC PEPTIDE: CPT | Mod: HCNC,NTX

## 2020-07-01 PROCEDURE — 96376 TX/PRO/DX INJ SAME DRUG ADON: CPT | Mod: HCNC,NTX

## 2020-07-01 PROCEDURE — 3078F DIAST BP <80 MM HG: CPT | Mod: HCNC,CPTII,S$GLB, | Performed by: INTERNAL MEDICINE

## 2020-07-01 PROCEDURE — 25000003 PHARM REV CODE 250: Mod: HCNC,NTX | Performed by: INTERNAL MEDICINE

## 2020-07-01 PROCEDURE — 99291 CRITICAL CARE FIRST HOUR: CPT | Mod: 25,HCNC,NTX

## 2020-07-01 PROCEDURE — 99900035 HC TECH TIME PER 15 MIN (STAT): Mod: HCNC,NTX

## 2020-07-01 PROCEDURE — 96372 THER/PROPH/DIAG INJ SC/IM: CPT | Mod: HCNC

## 2020-07-01 RX ORDER — FUROSEMIDE 10 MG/ML
20 INJECTION INTRAMUSCULAR; INTRAVENOUS
Status: COMPLETED | OUTPATIENT
Start: 2020-07-01 | End: 2020-07-01

## 2020-07-01 RX ORDER — NITROGLYCERIN 20 MG/100ML
5 INJECTION INTRAVENOUS
Status: COMPLETED | OUTPATIENT
Start: 2020-07-01 | End: 2020-07-02

## 2020-07-01 RX ORDER — NITROGLYCERIN 20 MG/100ML
INJECTION INTRAVENOUS
Status: COMPLETED
Start: 2020-07-01 | End: 2020-07-01

## 2020-07-01 RX ORDER — IBUPROFEN 200 MG
16 TABLET ORAL
Status: DISCONTINUED | OUTPATIENT
Start: 2020-07-01 | End: 2020-07-04 | Stop reason: HOSPADM

## 2020-07-01 RX ORDER — ENOXAPARIN SODIUM 100 MG/ML
30 INJECTION SUBCUTANEOUS EVERY 24 HOURS
Status: DISCONTINUED | OUTPATIENT
Start: 2020-07-01 | End: 2020-07-04 | Stop reason: HOSPADM

## 2020-07-01 RX ORDER — IBUPROFEN 200 MG
24 TABLET ORAL
Status: DISCONTINUED | OUTPATIENT
Start: 2020-07-01 | End: 2020-07-04 | Stop reason: HOSPADM

## 2020-07-01 RX ORDER — FUROSEMIDE 10 MG/ML
80 INJECTION INTRAMUSCULAR; INTRAVENOUS
Status: COMPLETED | OUTPATIENT
Start: 2020-07-01 | End: 2020-07-01

## 2020-07-01 RX ORDER — ONDANSETRON 2 MG/ML
4 INJECTION INTRAMUSCULAR; INTRAVENOUS EVERY 8 HOURS PRN
Status: DISCONTINUED | OUTPATIENT
Start: 2020-07-01 | End: 2020-07-04 | Stop reason: HOSPADM

## 2020-07-01 RX ORDER — ATORVASTATIN CALCIUM 40 MG/1
40 TABLET, FILM COATED ORAL DAILY
Status: DISCONTINUED | OUTPATIENT
Start: 2020-07-02 | End: 2020-07-04 | Stop reason: HOSPADM

## 2020-07-01 RX ORDER — SODIUM CHLORIDE 0.9 % (FLUSH) 0.9 %
10 SYRINGE (ML) INJECTION
Status: DISCONTINUED | OUTPATIENT
Start: 2020-07-01 | End: 2020-07-04 | Stop reason: HOSPADM

## 2020-07-01 RX ORDER — INSULIN ASPART 100 [IU]/ML
0-5 INJECTION, SOLUTION INTRAVENOUS; SUBCUTANEOUS
Status: DISCONTINUED | OUTPATIENT
Start: 2020-07-01 | End: 2020-07-04 | Stop reason: HOSPADM

## 2020-07-01 RX ORDER — NITROGLYCERIN 20 MG/100ML
5 INJECTION INTRAVENOUS CONTINUOUS
Status: DISCONTINUED | OUTPATIENT
Start: 2020-07-01 | End: 2020-07-03

## 2020-07-01 RX ORDER — GLUCAGON 1 MG
1 KIT INJECTION
Status: DISCONTINUED | OUTPATIENT
Start: 2020-07-01 | End: 2020-07-04 | Stop reason: HOSPADM

## 2020-07-01 RX ORDER — ASPIRIN 81 MG/1
81 TABLET ORAL DAILY
Status: DISCONTINUED | OUTPATIENT
Start: 2020-07-02 | End: 2020-07-04 | Stop reason: HOSPADM

## 2020-07-01 RX ORDER — CLOPIDOGREL BISULFATE 75 MG/1
75 TABLET ORAL DAILY
Status: DISCONTINUED | OUTPATIENT
Start: 2020-07-02 | End: 2020-07-04 | Stop reason: HOSPADM

## 2020-07-01 RX ADMIN — FUROSEMIDE 5 MG/HR: 10 INJECTION, SOLUTION INTRAMUSCULAR; INTRAVENOUS at 07:07

## 2020-07-01 RX ADMIN — FUROSEMIDE 20 MG: 10 INJECTION, SOLUTION INTRAMUSCULAR; INTRAVENOUS at 05:07

## 2020-07-01 RX ADMIN — ENOXAPARIN SODIUM 30 MG: 100 INJECTION SUBCUTANEOUS at 07:07

## 2020-07-01 RX ADMIN — FUROSEMIDE 80 MG: 10 INJECTION, SOLUTION INTRAMUSCULAR; INTRAVENOUS at 04:07

## 2020-07-01 RX ADMIN — NITROGLYCERIN 2 INCH: 20 OINTMENT TOPICAL at 04:07

## 2020-07-01 RX ADMIN — NITROGLYCERIN 5 MCG/MIN: 20 INJECTION INTRAVENOUS at 05:07

## 2020-07-01 RX ADMIN — EPOETIN ALFA-EPBX 40000 UNITS: 40000 INJECTION, SOLUTION INTRAVENOUS; SUBCUTANEOUS at 12:07

## 2020-07-01 RX ADMIN — CEFTRIAXONE 1 G: 1 INJECTION, SOLUTION INTRAVENOUS at 07:07

## 2020-07-01 NOTE — NURSING
1200: Patient reports having cough and wheeze at night for last 2 nights. Reports he ankles also began swelling approximately 2 days ago. BLE edema noted today. Patient reports discussing her symptoms with Dr. De La Cruz in appointment today and has made an appointment for later today to see her cardiologist for currrent symptoms.     1240: Patient returned from restroom c/o SOB and feeling weak. Patient request to go to ER at this time. Oxygen sat was 88% on room air, patient placed on 2 L oxygen via nasal cannula. O2 sat increased to 91% on 2 L oxygen.  Patient brought to ER via wheelchair as requested.   
1216: Injection given without difficulties.Bandaid applied. Patient instructed to stay in the clinic for 15 minutes. Patient verbalized understanding and will notify nurse with any complaints.  
Adequate: hears normal conversation without difficulty

## 2020-07-01 NOTE — ED PROVIDER NOTES
7/1/2020, 1:40 PM   History obtained from the patient       History of Present Illness: Avril Monzon is a 75 y.o. female patient presenting with tiffanie lower ext swelling. Hx of chf. Pt also reports cough    1:40 PM: Pt was placed back in lobby. I explained to pt that due to lack of available rooms pt was seen by myself to begin the workup. Pt understands they will be seen and dispositioned by the next available physician. Pt voices understanding and agrees with plan. Pt also understands the longer than normal wait time. I am removing myself from the care of pt and pt will now be assigned to the next available physician.     SCRIBE #1 NOTE: I, Carina Sharma, am scribing for, and in the presence of, Tavo Root MD. I have scribed the entire note.       History     Chief Complaint   Patient presents with    Shortness of Breath     pt reports cough and shortness of breath x 3 days     Review of patient's allergies indicates:   Allergen Reactions    Codeine Swelling    Darvon [propoxyphene] Swelling         History of Present Illness     HPI    7/1/2020, 3:46 PM  History obtained from the patient      History of Present Illness: Avril Monzon is a 75 y.o. female patient with a PMHx of type 2 DM, stroke, seizures, proteinuria, GERD, HTN, HLD, heart failure, CKD, anemia, NSTEMI, and CAD who presents to the Emergency Department for evaluation of SOB which onset gradually x3 days ago. Symptoms are worsening and moderate to severe in severity. No mitigating or exacerbating factors reported. Associated sxs include nonproductive cough and worsening bilat leg swelling. Patient denies any fever, chills, numbness, weakness, CP, n/v/d, and all other sxs at this time. Pt is compliant with all meds including diuretic. No further complaints or concerns at this time.       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 choroidal neovascularization 2/5/2019    GERD (gastroesophageal reflux disease)     Glaucoma     Heart failure     Hyperlipidemia     Hypertension     Non-ST elevation MI (NSTEMI) 11/28/2018    NSTEMI (non-ST elevated myocardial infarction) 11/27/2018    Peripheral neuropathy     Polyneuropathy     Proteinuria     >6 mo     Seizures     [facility] 1/2014 -dick CT NRI showed small vessel    Stroke 2012,2014    Tobacco dependence     Type 2 diabetes with peripheral circulatory disorder, controlled        Past Surgical History:  Past Surgical History:   Procedure Laterality Date    BREAST LUMPECTOMY Left     benign, when patient was 15 years old    BREAST MASS EXCISION      ,benign, age 15.    CATHETERIZATION OF BOTH LEFT AND RIGHT HEART N/A 11/28/2018    Procedure: CATHETERIZATION, HEART, BOTH LEFT AND RIGHT;  Surgeon: Michelle Holman MD;  Location: [facility] CATH LAB;  Service: Cardiology;  Laterality: N/A;    CATHETERIZATION OF BOTH LEFT AND RIGHT HEART N/A 11/30/2018    Procedure: CATHETERIZATION, HEART, BOTH LEFT AND RIGHT;  Surgeon: Michelle Holman MD;  Location: [facility] CATH LAB;  Service: Cardiology;  Laterality: N/A;    HYSTERECTOMY  1982    INSERTION OF SHUNT      LEFT HEART CATHETERIZATION Left 12/3/2018    Procedure: CATHETERIZATION, HEART, LEFT;  Surgeon: Michelle Holman MD;  Location: [facility] CATH LAB;  Service: Cardiology;  Laterality: Left;  LAD ATHERECTOMY STENT/ FEMORAL APPROACH    OOPHORECTOMY      wrist cyst Right 1980s     dorsal wrist cyst         Family History:  Family History   Problem Relation Age of Onset    Diabetes Mother     Hyperlipidemia Mother     Hypertension Mother     Heart disease Mother         MI    Muscular dystrophy Son     Cancer Maternal Grandmother         colon       Social History:  Social History     Tobacco Use    Smoking status: Former Smoker     Packs/day: 1.50     Years: 30.00     Pack years: 45.00     Types: Cigarettes     Quit date: 1990     Years since quittin.4    Smokeless tobacco: Former User   Substance and Sexual Activity    Alcohol use: No     Alcohol/week: 0.0 standard drinks    Drug use: No    Sexual activity: Never        Review of Systems     Review of Systems   Constitutional: Negative for chills and fever.   HENT: Negative for sore throat.    Respiratory: Positive for cough and shortness of breath.    Cardiovascular: Positive for leg swelling (bilat). Negative for chest pain.   Gastrointestinal: Negative for abdominal pain, diarrhea, nausea and vomiting.   Genitourinary: Negative for dysuria.   Musculoskeletal: Negative for back pain.   Skin: Negative for rash.   Neurological: Negative for weakness and numbness.   Hematological: Does not bruise/bleed easily.   All other systems reviewed and are negative.     Physical Exam     Initial Vitals [20 1341]   BP Pulse Resp Temp SpO2   (!) 235/95 68 (!) 22 97.7 °F (36.5 °C) 97 %      MAP       --          Physical Exam  Nursing Notes and Vital Signs Reviewed.  Constitutional: Patient is in mild distress. Well-developed and well-nourished.  Head: Atraumatic. Normocephalic.  Eyes: PERRL. EOM intact. Conjunctivae are not pale. No scleral icterus.  ENT: Mucous membranes are moist. Oropharynx is clear and symmetric.    Neck: Supple. Full ROM. No lymphadenopathy.  Cardiovascular: Regular rate. Regular rhythm. No murmurs, rubs, or gallops. Distal pulses are 2+ and symmetric.  Pulmonary/Chest: Tachypneic. Bilat diffuse rales.  Mild respiratory distress.  Abdominal: Soft and non-distended.  There is no tenderness.  No rebound, guarding, or rigidity. Good bowel sounds.  Genitourinary: No CVA tenderness  Musculoskeletal: Moves all extremities. No obvious deformities. No edema. No calf tenderness.  Skin: Warm and dry.  Neurological:  Alert, awake, and appropriate.  Normal speech.  No acute focal neurological deficits are appreciated.  Psychiatric: Normal affect. Good eye contact. Appropriate in content.     ED Course   Critical Care    Date/Time: 7/1/2020 4:17 PM  Performed by: Tavo Root MD  Authorized by: Tavo Root MD   Direct patient critical care time: 10 minutes  Additional history critical care time: 10 minutes  Ordering / reviewing critical care time: 10 minutes  Documentation critical care time: 5 minutes  Total critical care time (exclusive of procedural time) : 35 minutes  Critical care time was exclusive of separately billable procedures and treating other patients and teaching time.  Critical care was necessary to treat or prevent imminent or life-threatening deterioration of the following conditions: CHF exacerbation.  Critical care was time spent personally by me on the following activities: blood draw for specimens, development of treatment plan with patient or surrogate, discussions with consultants, interpretation of cardiac output measurements, evaluation of patient's response to treatment, examination of patient, obtaining history from patient or surrogate, ordering and performing treatments and interventions, ordering and review of laboratory studies, ordering and review of radiographic studies, pulse oximetry, re-evaluation of patient's condition and review of old charts.        ED Vital Signs:  Vitals:    07/02/20 1215 07/02/20 1245 07/02/20 1300 07/02/20 1315   BP: 139/63 (!) 147/36 (!) 147/74 (!) 160/44   Pulse: 72 73 72 74   Resp: 19 (!) 23 15 19   Temp:       TempSrc:       SpO2: 96% (!) 89% 99%  98%   Weight:       Height:        07/02/20 1330 07/02/20 1345 07/02/20 1400 07/02/20 1415   BP: (!) 152/41 (!) 145/38 (!) 149/40 (!) 151/41   Pulse: 72 72 72 72   Resp: 20 17 16 15   Temp:       TempSrc:       SpO2: 98% 98% 99% 99%   Weight:       Height:        07/02/20 1430 07/02/20 1445 07/02/20 1500 07/02/20 1515   BP: (!) 151/39 (!) 159/39 (!) 158/41 (!) 145/35   Pulse: 72 72 72 72   Resp: 19 16 16 17   Temp:    98.5 °F (36.9 °C)   TempSrc:    Oral   SpO2: 99% 99% 99% 99%   Weight:       Height:        07/02/20 1530 07/02/20 1545 07/02/20 1600   BP: (!) 150/38 (!) 146/42 (!) 147/39   Pulse: 71 72 71   Resp: 17 15 17   Temp:      TempSrc:      SpO2: 98% 100% 100%   Weight:      Height:          Abnormal Lab Results:  Labs Reviewed   CBC W/ AUTO DIFFERENTIAL - Abnormal; Notable for the following components:       Result Value    RBC 3.24 (*)     Hemoglobin 9.7 (*)     Hematocrit 32.0 (*)     Mean Corpuscular Volume 99 (*)     Mean Corpuscular Hemoglobin Conc 30.3 (*)     Lymph # 0.4 (*)     Mono # 0.2 (*)     Gran% 88.1 (*)     Lymph% 7.5 (*)     Mono% 3.6 (*)     All other components within normal limits   COMPREHENSIVE METABOLIC PANEL - Abnormal; Notable for the following components:    CO2 20 (*)     Glucose 334 (*)     BUN, Bld 88 (*)     Creatinine 5.0 (*)      (*)      (*)     eGFR if  9 (*)     eGFR if non  8 (*)     All other components within normal limits   B-TYPE NATRIURETIC PEPTIDE - Abnormal; Notable for the following components:    BNP 1,130 (*)     All other components within normal limits   TROPONIN I - Abnormal; Notable for the following components:    Troponin I 0.033 (*)     All other components within normal limits   URINALYSIS, REFLEX TO URINE CULTURE - Abnormal; Notable for the following components:    Protein, UA 2+ (*)     Glucose, UA 3+ (*)     Occult Blood UA Trace (*)     Nitrite, UA Positive (*)     All other components within normal  limits    Narrative:     Specimen Source->Urine   TROPONIN I - Abnormal; Notable for the following components:    Troponin I 0.035 (*)     All other components within normal limits   URINALYSIS MICROSCOPIC - Abnormal; Notable for the following components:    Bacteria Many (*)     All other components within normal limits    Narrative:     Specimen Source->Urine   URINALYSIS, REFLEX TO URINE CULTURE - Abnormal; Notable for the following components:    Protein, UA 1+ (*)     Glucose, UA 2+ (*)     Occult Blood UA Trace (*)     Nitrite, UA Positive (*)     Leukocytes, UA 1+ (*)     All other components within normal limits    Narrative:     Specimen Source->Urine   HEMOGLOBIN A1C - Abnormal; Notable for the following components:    Hemoglobin A1C 7.9 (*)     Estimated Avg Glucose 180 (*)     All other components within normal limits   URINALYSIS MICROSCOPIC - Abnormal; Notable for the following components:    WBC Clumps, UA Occasional (*)     Bacteria Few (*)     All other components within normal limits    Narrative:     Specimen Source->Urine   RENAL FUNCTION PANEL - Abnormal; Notable for the following components:    Glucose 444 (*)     CO2 21 (*)     BUN, Bld 87 (*)     Calcium 8.2 (*)     Creatinine 4.7 (*)     Albumin 3.2 (*)     eGFR if  10 (*)     eGFR if non  9 (*)     All other components within normal limits   CBC W/ AUTO DIFFERENTIAL - Abnormal; Notable for the following components:    RBC 2.98 (*)     Hemoglobin 9.0 (*)     Hematocrit 29.2 (*)     Mean Corpuscular Hemoglobin Conc 30.8 (*)     Lymph # 0.6 (*)     Gran% 86.4 (*)     Lymph% 6.7 (*)     All other components within normal limits   COMPREHENSIVE METABOLIC PANEL - Abnormal; Notable for the following components:    CO2 21 (*)     Glucose 444 (*)     BUN, Bld 87 (*)     Creatinine 4.7 (*)     Calcium 8.2 (*)     Total Protein 5.9 (*)     Albumin 3.2 (*)     ALT 84 (*)     eGFR if  10 (*)     eGFR if  non  9 (*)     All other components within normal limits   POCT GLUCOSE - Abnormal; Notable for the following components:    POCT Glucose 309 (*)     All other components within normal limits   SARS-COV-2 RNA AMPLIFICATION, QUAL   MAGNESIUM   POCT GLUCOSE MONITORING CONTINUOUS        All Lab Results:  Results for orders placed or performed during the hospital encounter of 07/01/20   CBC auto differential   Result Value Ref Range    WBC 5.83 3.90 - 12.70 K/uL    RBC 3.24 (L) 4.00 - 5.40 M/uL    Hemoglobin 9.7 (L) 12.0 - 16.0 g/dL    Hematocrit 32.0 (L) 37.0 - 48.5 %    Mean Corpuscular Volume 99 (H) 82 - 98 fL    Mean Corpuscular Hemoglobin 29.9 27.0 - 31.0 pg    Mean Corpuscular Hemoglobin Conc 30.3 (L) 32.0 - 36.0 g/dL    RDW 12.7 11.5 - 14.5 %    Platelets 194 150 - 350 K/uL    MPV 10.3 9.2 - 12.9 fL    Immature Granulocytes 0.2 0.0 - 0.5 %    Gran # (ANC) 5.1 1.8 - 7.7 K/uL    Immature Grans (Abs) 0.01 0.00 - 0.04 K/uL    Lymph # 0.4 (L) 1.0 - 4.8 K/uL    Mono # 0.2 (L) 0.3 - 1.0 K/uL    Eos # 0.0 0.0 - 0.5 K/uL    Baso # 0.02 0.00 - 0.20 K/uL    nRBC 0 0 /100 WBC    Gran% 88.1 (H) 38.0 - 73.0 %    Lymph% 7.5 (L) 18.0 - 48.0 %    Mono% 3.6 (L) 4.0 - 15.0 %    Eosinophil% 0.3 0.0 - 8.0 %    Basophil% 0.3 0.0 - 1.9 %    Differential Method Automated    Comprehensive metabolic panel   Result Value Ref Range    Sodium 139 136 - 145 mmol/L    Potassium 4.8 3.5 - 5.1 mmol/L    Chloride 107 95 - 110 mmol/L    CO2 20 (L) 23 - 29 mmol/L    Glucose 334 (H) 70 - 110 mg/dL    BUN, Bld 88 (H) 8 - 23 mg/dL    Creatinine 5.0 (H) 0.5 - 1.4 mg/dL    Calcium 8.8 8.7 - 10.5 mg/dL    Total Protein 6.7 6.0 - 8.4 g/dL    Albumin 3.8 3.5 - 5.2 g/dL    Total Bilirubin 0.3 0.1 - 1.0 mg/dL    Alkaline Phosphatase 87 55 - 135 U/L     (H) 10 - 40 U/L     (H) 10 - 44 U/L    Anion Gap 12 8 - 16 mmol/L    eGFR if African American 9 (A) >60 mL/min/1.73 m^2    eGFR if non African American 8 (A) >60 mL/min/1.73 m^2    Brain natriuretic peptide   Result Value Ref Range    BNP 1,130 (H) 0 - 99 pg/mL   Troponin I   Result Value Ref Range    Troponin I 0.033 (H) 0.000 - 0.026 ng/mL   Urinalysis, Reflex to Urine Culture Urine, Clean Catch    Specimen: Urine   Result Value Ref Range    Specimen UA Urine, Clean Catch     Color, UA Yellow Yellow, Straw, Hanane    Appearance, UA Clear Clear    pH, UA 6.0 5.0 - 8.0    Specific Gravity, UA 1.020 1.005 - 1.030    Protein, UA 2+ (A) Negative    Glucose, UA 3+ (A) Negative    Ketones, UA Negative Negative    Bilirubin (UA) Negative Negative    Occult Blood UA Trace (A) Negative    Nitrite, UA Positive (A) Negative    Urobilinogen, UA Negative <2.0 EU/dL    Leukocytes, UA Negative Negative   COVID-19 Rapid Screening   Result Value Ref Range    SARS-CoV-2 RNA, Amplification, Qual Negative Negative   Troponin I   Result Value Ref Range    Troponin I 0.035 (H) 0.000 - 0.026 ng/mL   Urinalysis Microscopic   Result Value Ref Range    RBC, UA 0 0 - 4 /hpf    WBC, UA 5 0 - 5 /hpf    Bacteria Many (A) None-Occ /hpf    Yeast, UA None None    Hyaline Casts, UA 0 0-1/lpf /lpf    Microscopic Comment SEE COMMENT    Urinalysis, Reflex to Urine Culture Urine, Clean Catch    Specimen: Urine   Result Value Ref Range    Specimen UA Urine, Catheterized     Color, UA Yellow Yellow, Straw, Hanane    Appearance, UA Clear Clear    pH, UA 6.0 5.0 - 8.0    Specific Gravity, UA 1.015 1.005 - 1.030    Protein, UA 1+ (A) Negative    Glucose, UA 2+ (A) Negative    Ketones, UA Negative Negative    Bilirubin (UA) Negative Negative    Occult Blood UA Trace (A) Negative    Nitrite, UA Positive (A) Negative    Urobilinogen, UA Negative <2.0 EU/dL    Leukocytes, UA 1+ (A) Negative   Hemoglobin A1c if not done in past 3 months   Result Value Ref Range    Hemoglobin A1C 7.9 (H) 4.0 - 5.6 %    Estimated Avg Glucose 180 (H) 68 - 131 mg/dL   Urinalysis Microscopic   Result Value Ref Range    RBC, UA 0 0 - 4 /hpf    WBC, UA 5 0 - 5  /hpf    WBC Clumps, UA Occasional (A) None-Rare    Bacteria Few (A) None-Occ /hpf    Hyaline Casts, UA 0 0-1/lpf /lpf    Microscopic Comment SEE COMMENT    Renal function panel   Result Value Ref Range    Glucose 444 (H) 70 - 110 mg/dL    Sodium 137 136 - 145 mmol/L    Potassium 4.2 3.5 - 5.1 mmol/L    Chloride 104 95 - 110 mmol/L    CO2 21 (L) 23 - 29 mmol/L    BUN, Bld 87 (H) 8 - 23 mg/dL    Calcium 8.2 (L) 8.7 - 10.5 mg/dL    Creatinine 4.7 (H) 0.5 - 1.4 mg/dL    Albumin 3.2 (L) 3.5 - 5.2 g/dL    Phosphorus 4.3 2.7 - 4.5 mg/dL    eGFR if African American 10 (A) >60 mL/min/1.73 m^2    eGFR if non African American 9 (A) >60 mL/min/1.73 m^2    Anion Gap 12 8 - 16 mmol/L   CBC auto differential   Result Value Ref Range    WBC 8.18 3.90 - 12.70 K/uL    RBC 2.98 (L) 4.00 - 5.40 M/uL    Hemoglobin 9.0 (L) 12.0 - 16.0 g/dL    Hematocrit 29.2 (L) 37.0 - 48.5 %    Mean Corpuscular Volume 98 82 - 98 fL    Mean Corpuscular Hemoglobin 30.2 27.0 - 31.0 pg    Mean Corpuscular Hemoglobin Conc 30.8 (L) 32.0 - 36.0 g/dL    RDW 12.8 11.5 - 14.5 %    Platelets 175 150 - 350 K/uL    MPV 10.2 9.2 - 12.9 fL    Immature Granulocytes 0.2 0.0 - 0.5 %    Gran # (ANC) 7.1 1.8 - 7.7 K/uL    Immature Grans (Abs) 0.02 0.00 - 0.04 K/uL    Lymph # 0.6 (L) 1.0 - 4.8 K/uL    Mono # 0.5 0.3 - 1.0 K/uL    Eos # 0.0 0.0 - 0.5 K/uL    Baso # 0.01 0.00 - 0.20 K/uL    nRBC 0 0 /100 WBC    Gran% 86.4 (H) 38.0 - 73.0 %    Lymph% 6.7 (L) 18.0 - 48.0 %    Mono% 6.6 4.0 - 15.0 %    Eosinophil% 0.0 0.0 - 8.0 %    Basophil% 0.1 0.0 - 1.9 %    Differential Method Automated    Comprehensive metabolic panel   Result Value Ref Range    Sodium 137 136 - 145 mmol/L    Potassium 4.2 3.5 - 5.1 mmol/L    Chloride 104 95 - 110 mmol/L    CO2 21 (L) 23 - 29 mmol/L    Glucose 444 (H) 70 - 110 mg/dL    BUN, Bld 87 (H) 8 - 23 mg/dL    Creatinine 4.7 (H) 0.5 - 1.4 mg/dL    Calcium 8.2 (L) 8.7 - 10.5 mg/dL    Total Protein 5.9 (L) 6.0 - 8.4 g/dL    Albumin 3.2 (L) 3.5 - 5.2  g/dL    Total Bilirubin 0.3 0.1 - 1.0 mg/dL    Alkaline Phosphatase 70 55 - 135 U/L    AST 40 10 - 40 U/L    ALT 84 (H) 10 - 44 U/L    Anion Gap 12 8 - 16 mmol/L    eGFR if African American 10 (A) >60 mL/min/1.73 m^2    eGFR if non African American 9 (A) >60 mL/min/1.73 m^2   Magnesium   Result Value Ref Range    Magnesium 2.0 1.6 - 2.6 mg/dL   Troponin I   Result Value Ref Range    Troponin I 0.104 (H) 0.000 - 0.026 ng/mL   Echo Color Flow Doppler? Yes   Result Value Ref Range    Ascending aorta 2.20 cm    STJ 2.28 cm    AV mean gradient 10 mmHg    Ao peak lio 2.08 m/s    Ao VTI 44.36 cm    IVRT 39.96 msec    IVS 1.33 (A) 0.6 - 1.1 cm    LA size 3.46 cm    Left Atrium Major Axis 3.81 cm    Left Atrium Minor Axis 3.49 cm    LVIDD 4.14 3.5 - 6.0 cm    LVIDS 2.97 2.1 - 4.0 cm    LVOT diameter 1.77 cm    LVOT peak VTI 31.02 cm    PW 1.05 0.6 - 1.1 cm    MV Peak A Lio 1.09 m/s    E wave decelartion time 219.83 msec    MV Peak E Lio 1.14 m/s    PV Peak D Lio 0.79 m/s    PV Peak S Lio 0.55 m/s    RA Major Axis 3.62 cm    Sinus 2.58 cm    TAPSE 1.75 cm    TR Max Lio 4.02 m/s    TDI LATERAL 0.07 m/s    TDI SEPTAL 0.06 m/s    LA WIDTH 3.74 cm    Ao root annulus 2.72 cm    MV stenosis pressure 1/2 time 63.75 ms    LV Diastolic Volume 76.09 mL    LV Systolic Volume 34.18 mL    LVOT peak lio 1.41 m/s    RVOT peak VTI 24.63 cm    RVOT peak lio 1.06 m/s    PV mean gradient 2.27 mmHg    LV LATERAL E/E' RATIO 16.29 m/s    LV SEPTAL E/E' RATIO 19.00 m/s    FS 28 %    LA volume 40.07 cm3    LV mass 172.17 g    Left Ventricle Relative Wall Thickness 0.51 cm    AV valve area 1.72 cm2    AV Velocity Ratio 0.68     AV index (prosthetic) 0.70     MV valve area p 1/2 method 3.45 cm2    E/A ratio 1.05     Mean e' 0.07 m/s    Pulm vein S/D ratio 0.70     LVOT area 2.5 cm2    LVOT stroke volume 76.29 cm3    AV peak gradient 17 mmHg    E/E' ratio 17.54 m/s    LV Systolic Volume Index 19.5 mL/m2    LV Diastolic Volume Index 43.49 mL/m2    LA  Volume Index 22.9 mL/m2    LV Mass Index 98 g/m2    Triscuspid Valve Regurgitation Peak Gradient 65 mmHg    BSA 1.76 m2    Right Atrial Pressure (from IVC) 3 mmHg    TV rest pulmonary artery pressure 68 mmHg   POCT glucose   Result Value Ref Range    POCT Glucose 309 (H) 70 - 110 mg/dL   POCT glucose   Result Value Ref Range    POCT Glucose 286 (H) 70 - 110 mg/dL   POCT glucose   Result Value Ref Range    POCT Glucose 226 (H) 70 - 110 mg/dL       Imaging Results:  Imaging Results          X-Ray Chest AP Portable (Final result)  Result time 07/01/20 14:06:26    Final result by Trev Dean MD (07/01/20 14:06:26)                 Impression:      1.  Moderate vascular congestion with small effusions, Kerley B-lines and cardiac silhouette size enlargement.  Findings most likely represent interstitial pulmonary edema.  Less likely could an interstitial infectious process cause this appearance.    2.  Stable findings as noted above.      Electronically signed by: Trev Dean MD  Date:    07/01/2020  Time:    14:06             Narrative:    EXAMINATION:  XR CHEST AP PORTABLE    CLINICAL HISTORY:  Heart failure, unspecified    COMPARISON:  March 26, 2019    FINDINGS:  Small effusions.  Vascular congestion with Kerley B lines.  Moderate basilar interstitial changes.  The upper lungs are clear. The cardiac silhouette size is enlarged.  The trachea is midline and the mediastinal width is normal. Negative for pneumothorax.  Negative for osseous abnormalities. Tortuous aorta with calcifications of the aortic knob.  Degenerative changes of the spine and both shoulder girdles.                                 The EKG was ordered, reviewed, and independently interpreted by the ED provider.  Interpretation time: 1444  Rate: 69 BPM  Rhythm: normal sinus rhythm  Interpretation: No acute ST changes. No STEMI.         The Emergency Provider reviewed the vital signs and test results, which are outlined above.     ED Discussion      5:18PM: Oxygen 88% on room air. 230 systolic despite 2 inches of nitroglycerin paste. Will start nitroglycerin infusion and BIPAP.    6:04 PM: Re-evaluated pt. I have discussed test results, shared treatment plan, and the need for admission with patient and family at bedside. Pt and family express understanding at this time and agree with all information. All questions answered. Pt and family have no further questions or concerns at this time. Pt is ready for admit.    6:04 PM: Discussed case with Nidhi Miller NP (Hospital Medicine). Dr. Dos Santos agrees with current care and management of pt and accepts admission.   Admitting Service: Hospital Medicine  Admitting Physician: Dr. Dos Santos  Admit to: inpatient ICU                   ED Medication(s):  Medications   sodium chloride 0.9% flush 10 mL (has no administration in time range)   enoxaparin injection 30 mg (30 mg Subcutaneous Given 7/2/20 1610)   ondansetron injection 4 mg (has no administration in time range)   aspirin EC tablet 81 mg (81 mg Oral Given 7/2/20 0847)   atorvastatin tablet 40 mg (40 mg Oral Given 7/2/20 0847)   clopidogreL tablet 75 mg (75 mg Oral Given 7/2/20 0847)   cefTRIAXone (ROCEPHIN) 1 g/50 mL D5W IVPB (0 g Intravenous Stopped 7/1/20 2033)   nitroGLYCERIN 50 mg in dextrose 5 % 250 mL infusion (TITRATING) (0 mcg/min Intravenous Stopped 7/2/20 1400)   glucose chewable tablet 16 g (has no administration in time range)   glucose chewable tablet 24 g (has no administration in time range)   dextrose 50% injection 12.5 g (has no administration in time range)   dextrose 50% injection 25 g (has no administration in time range)   glucagon (human recombinant) injection 1 mg (has no administration in time range)   insulin aspart U-100 pen 0-5 Units (3 Units Subcutaneous Given 7/2/20 0632)   metoprolol tartrate (LOPRESSOR) tablet 25 mg (25 mg Oral Given 7/2/20 0847)   amLODIPine tablet 10 mg (10 mg Oral Given 7/2/20 0847)   hydrALAZINE tablet 25  mg (25 mg Oral Given 7/2/20 1308)   insulin detemir U-100 pen 15 Units (has no administration in time range)   furosemide injection 60 mg (60 mg Intravenous Given 7/2/20 1610)   furosemide injection 80 mg (80 mg Intravenous Given 7/1/20 1615)   nitroGLYCERIN 2% TD oint ointment 2 inch (2 inches Topical (Top) Given 7/1/20 1614)   nitroGLYCERIN 50 mg in dextrose 5 % 250 mL infusion (TITRATING) (0 mcg/min Intravenous Stopped 7/2/20 0550)   furosemide injection 20 mg (20 mg Intravenous Given 7/1/20 1741)       Current Discharge Medication List                  Scribe Attestation:   Scribe #1: I performed the above scribed service and the documentation accurately describes the services I performed. I attest to the accuracy of the note.     Attending:   Physician Attestation Statement for Scribe #1: I, Tavo Root MD, personally performed the services described in this documentation, as scribed by Carina Sharma, in my presence, and it is both accurate and complete.           Clinical Impression       ICD-10-CM ICD-9-CM   1. Acute pulmonary edema  J81.0 518.4   2. CHF (congestive heart failure)  I50.9 428.0   3. Hypertensive emergency  I16.1 401.9   4. Acute congestive heart failure, unspecified heart failure type  I50.9 428.0       Disposition:   Disposition: Admitted  Condition: Serious       Tavo Root MD  07/02/20 2809

## 2020-07-01 NOTE — PLAN OF CARE
1213: Dr. Vergara notified of patient /71 today. She is ok to proceed with Retacrit injection today.

## 2020-07-01 NOTE — ED TRIAGE NOTES
Arrives to ER from cancer center due to SOB, reports starting getting SOB while getting infusions, denying any CP. Current RA sats 88%, placed on 2L nasal canula with improvement. Denying any n/v/d. Dr. Root notified of pt.'s RA sats.

## 2020-07-01 NOTE — DISCHARGE INSTRUCTIONS
[facility]  [address] [facility] [address]  [address]  [phone] phone     [phone] fax  Hours of Operation: Monday- Friday 8:00am- 5:00pm  After hours phone  [phone]  Hematology / Oncology Physicians on call      Dr. Doug Archibald, JESSENIA Perdomo NP Tyesha Taylor, NP    Please call with any concerns regarding your appointment today.      PROCRIT - Epoetin Isreal Solution for injection   What is this medicine?   EPOETIN ISREAL (e BETH e tin AL fa) helps your body make more red blood cells. This medicine is used to treat anemia caused by chronic kidney failure, cancer chemotherapy, or HIV-therapy. It may also be used before surgery if you have anemia.   This medicine may be used for other purposes; ask your health care provider or pharmacist if you have questions.   What should I tell my health care provider before I take this medicine?   They need to know if you have any of these conditions:   blood clotting disorders   cancer patient not on chemotherapy   cystic fibrosis   heart disease, such as angina or heart failure   hemoglobin level of 12 g/dL or greater   high blood pressure   low levels of folate, iron, or vitamin B12   seizures   an unusual or allergic reaction to erythropoietin, albumin, benzyl alcohol, hamster proteins, other medicines, foods, dyes, or preservatives   pregnant or trying to get pregnant   breast-feeding  How should I use this medicine?   This medicine is for injection into a vein or under the skin. It is usually given by a health care professional in a hospital or clinic setting.   If you get this medicine at home, you will be taught how to prepare and give this medicine. Use exactly as directed. Take your medicine at regular intervals. Do not take your medicine more often than directed.   It is important that you put your used needles and syringes in a  special sharps container. Do not put them in a trash can. If you do not have a sharps container, call your pharmacist or healthcare provider to get one.   Talk to your pediatrician regarding the use of this medicine in children. While this drug may be prescribed for selected conditions, precautions do apply.   Overdosage: If you think you have taken too much of this medicine contact a poison control center or emergency room at once.   NOTE: This medicine is only for you. Do not share this medicine with others.   What if I miss a dose?   If you miss a dose, take it as soon as you can. If it is almost time for your next dose, take only that dose. Do not take double or extra doses.   What may interact with this medicine?   Do not take this medicine with any of the following medications:   darbepoetin roman  This list may not describe all possible interactions. Give your health care provider a list of all the medicines, herbs, non-prescription drugs, or dietary supplements you use. Also tell them if you smoke, drink alcohol, or use illegal drugs. Some items may interact with your medicine.   What should I watch for while using this medicine?   Visit your prescriber or health care professional for regular checks on your progress and for the needed blood tests and blood pressure measurements. It is especially important for the doctor to make sure your hemoglobin level is in the desired range, to limit the risk of potential side effects and to give you the best benefit. Keep all appointments for any recommended tests. Check your blood pressure as directed. Ask your doctor what your blood pressure should be and when you should contact him or her.   As your body makes more red blood cells, you may need to take iron, folic acid, or vitamin B supplements. Ask your doctor or health care provider which products are right for you. If you have kidney disease continue dietary restrictions, even though this medication can make you  feel better. Talk with your doctor or health care professional about the foods you eat and the vitamins that you take.   What side effects may I notice from receiving this medicine?   Side effects that you should report to your doctor or health care professional as soon as possible:   allergic reactions like skin rash, itching or hives, swelling of the face, lips, or tongue   breathing problems   changes in vision   chest pain   confusion, trouble speaking or understanding   feeling faint or lightheaded, falls   high blood pressure   muscle aches or pains   pain, swelling, warmth in the leg   rapid weight gain   severe headaches   sudden numbness or weakness of the face, arm or leg   trouble walking, dizziness, loss of balance or coordination   seizures (convulsions)   swelling of the ankles, feet, hands   unusually weak or tired  Side effects that usually do not require medical attention (report to your doctor or health care professional if they continue or are bothersome):   diarrhea   fever, chills (flu-like symptoms)   headaches   nausea, vomiting   redness, stinging, or swelling at site where injected  This list may not describe all possible side effects. Call your doctor for medical advice about side effects. You may report side effects to FDA at 2-247-FDA-1780.   Where should I keep my medicine?   Keep out of the reach of children.   Store in a refrigerator between 2 and 8 degrees C (36 and 46 degrees F). Do not freeze or shake. Throw away any unused portion if using a single-dose vial. Multi-dose vials can be kept in the refrigerator for up to 21 days after the initial dose. Throw away unused medicine.   NOTE:This sheet is a summary. It may not cover all possible information. If you have questions about this medicine, talk to your doctor, pharmacist, or health care provider. Copyright© 2011 [employer]

## 2020-07-01 NOTE — PROGRESS NOTES
RT to call to bs per MD to start NPV BIPAP on pt for sob. RT discussed with pt the benefits of using the machine, pt agrees. States it is helping with her breathing. RN also at bs; will continue to monitor.

## 2020-07-02 LAB
ALBUMIN SERPL BCP-MCNC: 3.2 G/DL (ref 3.5–5.2)
ALBUMIN SERPL BCP-MCNC: 3.2 G/DL (ref 3.5–5.2)
ALP SERPL-CCNC: 70 U/L (ref 55–135)
ALT SERPL W/O P-5'-P-CCNC: 84 U/L (ref 10–44)
ANION GAP SERPL CALC-SCNC: 12 MMOL/L (ref 8–16)
ANION GAP SERPL CALC-SCNC: 12 MMOL/L (ref 8–16)
AORTIC ROOT ANNULUS: 2.72 CM
ASCENDING AORTA: 2.2 CM
AST SERPL-CCNC: 40 U/L (ref 10–40)
AV INDEX (PROSTH): 0.7
AV MEAN GRADIENT: 10 MMHG
AV PEAK GRADIENT: 17 MMHG
AV VALVE AREA: 1.72 CM2
AV VELOCITY RATIO: 0.68
BASOPHILS # BLD AUTO: 0.01 K/UL (ref 0–0.2)
BASOPHILS NFR BLD: 0.1 % (ref 0–1.9)
BILIRUB SERPL-MCNC: 0.3 MG/DL (ref 0.1–1)
BSA FOR ECHO PROCEDURE: 1.76 M2
BUN SERPL-MCNC: 87 MG/DL (ref 8–23)
BUN SERPL-MCNC: 87 MG/DL (ref 8–23)
CALCIUM SERPL-MCNC: 8.2 MG/DL (ref 8.7–10.5)
CALCIUM SERPL-MCNC: 8.2 MG/DL (ref 8.7–10.5)
CHLORIDE SERPL-SCNC: 104 MMOL/L (ref 95–110)
CHLORIDE SERPL-SCNC: 104 MMOL/L (ref 95–110)
CO2 SERPL-SCNC: 21 MMOL/L (ref 23–29)
CO2 SERPL-SCNC: 21 MMOL/L (ref 23–29)
CREAT SERPL-MCNC: 4.7 MG/DL (ref 0.5–1.4)
CREAT SERPL-MCNC: 4.7 MG/DL (ref 0.5–1.4)
CV ECHO LV RWT: 0.51 CM
DIFFERENTIAL METHOD: ABNORMAL
DOP CALC AO PEAK VEL: 2.08 M/S
DOP CALC AO VTI: 44.36 CM
DOP CALC LVOT AREA: 2.5 CM2
DOP CALC LVOT DIAMETER: 1.77 CM
DOP CALC LVOT PEAK VEL: 1.41 M/S
DOP CALC LVOT STROKE VOLUME: 76.29 CM3
DOP CALC RVOT PEAK VEL: 1.06 M/S
DOP CALC RVOT VTI: 24.63 CM
DOP CALCLVOT PEAK VEL VTI: 31.02 CM
E WAVE DECELERATION TIME: 219.83 MSEC
E/A RATIO: 1.05
E/E' RATIO: 17.54 M/S
ECHO LV POSTERIOR WALL: 1.05 CM (ref 0.6–1.1)
EOSINOPHIL # BLD AUTO: 0 K/UL (ref 0–0.5)
EOSINOPHIL NFR BLD: 0 % (ref 0–8)
ERYTHROCYTE [DISTWIDTH] IN BLOOD BY AUTOMATED COUNT: 12.8 % (ref 11.5–14.5)
EST. GFR  (AFRICAN AMERICAN): 10 ML/MIN/1.73 M^2
EST. GFR  (AFRICAN AMERICAN): 10 ML/MIN/1.73 M^2
EST. GFR  (NON AFRICAN AMERICAN): 9 ML/MIN/1.73 M^2
EST. GFR  (NON AFRICAN AMERICAN): 9 ML/MIN/1.73 M^2
ESTIMATED AVG GLUCOSE: 180 MG/DL (ref 68–131)
FRACTIONAL SHORTENING: 28 % (ref 28–44)
GLUCOSE SERPL-MCNC: 444 MG/DL (ref 70–110)
GLUCOSE SERPL-MCNC: 444 MG/DL (ref 70–110)
HBA1C MFR BLD HPLC: 7.9 % (ref 4–5.6)
HCT VFR BLD AUTO: 29.2 % (ref 37–48.5)
HGB BLD-MCNC: 9 G/DL (ref 12–16)
IMM GRANULOCYTES # BLD AUTO: 0.02 K/UL (ref 0–0.04)
IMM GRANULOCYTES NFR BLD AUTO: 0.2 % (ref 0–0.5)
INTERVENTRICULAR SEPTUM: 1.33 CM (ref 0.6–1.1)
IVRT: 39.96 MSEC
LA MAJOR: 3.81 CM
LA MINOR: 3.49 CM
LA WIDTH: 3.74 CM
LEFT ATRIUM SIZE: 3.46 CM
LEFT ATRIUM VOLUME INDEX: 22.9 ML/M2
LEFT ATRIUM VOLUME: 40.07 CM3
LEFT INTERNAL DIMENSION IN SYSTOLE: 2.97 CM (ref 2.1–4)
LEFT VENTRICLE DIASTOLIC VOLUME INDEX: 43.49 ML/M2
LEFT VENTRICLE DIASTOLIC VOLUME: 76.09 ML
LEFT VENTRICLE MASS INDEX: 98 G/M2
LEFT VENTRICLE SYSTOLIC VOLUME INDEX: 19.5 ML/M2
LEFT VENTRICLE SYSTOLIC VOLUME: 34.18 ML
LEFT VENTRICULAR INTERNAL DIMENSION IN DIASTOLE: 4.14 CM (ref 3.5–6)
LEFT VENTRICULAR MASS: 172.17 G
LV LATERAL E/E' RATIO: 16.29 M/S
LV SEPTAL E/E' RATIO: 19 M/S
LYMPHOCYTES # BLD AUTO: 0.6 K/UL (ref 1–4.8)
LYMPHOCYTES NFR BLD: 6.7 % (ref 18–48)
MAGNESIUM SERPL-MCNC: 2 MG/DL (ref 1.6–2.6)
MCH RBC QN AUTO: 30.2 PG (ref 27–31)
MCHC RBC AUTO-ENTMCNC: 30.8 G/DL (ref 32–36)
MCV RBC AUTO: 98 FL (ref 82–98)
MONOCYTES # BLD AUTO: 0.5 K/UL (ref 0.3–1)
MONOCYTES NFR BLD: 6.6 % (ref 4–15)
MV PEAK A VEL: 1.09 M/S
MV PEAK E VEL: 1.14 M/S
MV STENOSIS PRESSURE HALF TIME: 63.75 MS
MV VALVE AREA P 1/2 METHOD: 3.45 CM2
NEUTROPHILS # BLD AUTO: 7.1 K/UL (ref 1.8–7.7)
NEUTROPHILS NFR BLD: 86.4 % (ref 38–73)
NRBC BLD-RTO: 0 /100 WBC
PHOSPHATE SERPL-MCNC: 4.3 MG/DL (ref 2.7–4.5)
PISA TR MAX VEL: 4.02 M/S
PLATELET # BLD AUTO: 175 K/UL (ref 150–350)
PMV BLD AUTO: 10.2 FL (ref 9.2–12.9)
POCT GLUCOSE: 226 MG/DL (ref 70–110)
POCT GLUCOSE: 286 MG/DL (ref 70–110)
POCT GLUCOSE: 305 MG/DL (ref 70–110)
POTASSIUM SERPL-SCNC: 4.2 MMOL/L (ref 3.5–5.1)
POTASSIUM SERPL-SCNC: 4.2 MMOL/L (ref 3.5–5.1)
PROT SERPL-MCNC: 5.9 G/DL (ref 6–8.4)
PULM VEIN S/D RATIO: 0.7
PV MEAN GRADIENT: 2.27 MMHG
PV PEAK D VEL: 0.79 M/S
PV PEAK S VEL: 0.55 M/S
RA MAJOR: 3.62 CM
RA PRESSURE: 3 MMHG
RBC # BLD AUTO: 2.98 M/UL (ref 4–5.4)
SINUS: 2.58 CM
SODIUM SERPL-SCNC: 137 MMOL/L (ref 136–145)
SODIUM SERPL-SCNC: 137 MMOL/L (ref 136–145)
STJ: 2.28 CM
TDI LATERAL: 0.07 M/S
TDI SEPTAL: 0.06 M/S
TDI: 0.07 M/S
TR MAX PG: 65 MMHG
TRICUSPID ANNULAR PLANE SYSTOLIC EXCURSION: 1.75 CM
TROPONIN I SERPL DL<=0.01 NG/ML-MCNC: 0.09 NG/ML (ref 0–0.03)
TROPONIN I SERPL DL<=0.01 NG/ML-MCNC: 0.1 NG/ML (ref 0–0.03)
TV REST PULMONARY ARTERY PRESSURE: 68 MMHG
WBC # BLD AUTO: 8.18 K/UL (ref 3.9–12.7)

## 2020-07-02 PROCEDURE — 80053 COMPREHEN METABOLIC PANEL: CPT | Mod: HCNC,NTX

## 2020-07-02 PROCEDURE — 83735 ASSAY OF MAGNESIUM: CPT | Mod: HCNC,NTX

## 2020-07-02 PROCEDURE — 36415 COLL VENOUS BLD VENIPUNCTURE: CPT | Mod: HCNC,NTX

## 2020-07-02 PROCEDURE — C9399 UNCLASSIFIED DRUGS OR BIOLOG: HCPCS | Mod: HCNC,NTX | Performed by: INTERNAL MEDICINE

## 2020-07-02 PROCEDURE — 25000003 PHARM REV CODE 250: Mod: HCNC,NTX | Performed by: INTERNAL MEDICINE

## 2020-07-02 PROCEDURE — 99223 PR INITIAL HOSPITAL CARE,LEVL III: ICD-10-PCS | Mod: HCNC,NTX,, | Performed by: INTERNAL MEDICINE

## 2020-07-02 PROCEDURE — 80069 RENAL FUNCTION PANEL: CPT | Mod: HCNC,NTX

## 2020-07-02 PROCEDURE — 83036 HEMOGLOBIN GLYCOSYLATED A1C: CPT | Mod: HCNC,NTX

## 2020-07-02 PROCEDURE — 99900035 HC TECH TIME PER 15 MIN (STAT): Mod: HCNC,NTX

## 2020-07-02 PROCEDURE — 84484 ASSAY OF TROPONIN QUANT: CPT | Mod: HCNC,NTX

## 2020-07-02 PROCEDURE — 63600175 PHARM REV CODE 636 W HCPCS: Mod: HCNC,NTX | Performed by: INTERNAL MEDICINE

## 2020-07-02 PROCEDURE — 85025 COMPLETE CBC W/AUTO DIFF WBC: CPT | Mod: HCNC,NTX

## 2020-07-02 PROCEDURE — 20000000 HC ICU ROOM: Mod: HCNC,NTX

## 2020-07-02 PROCEDURE — 99223 1ST HOSP IP/OBS HIGH 75: CPT | Mod: HCNC,NTX,, | Performed by: INTERNAL MEDICINE

## 2020-07-02 PROCEDURE — 27000221 HC OXYGEN, UP TO 24 HOURS: Mod: HCNC,NTX

## 2020-07-02 RX ORDER — HYDRALAZINE HYDROCHLORIDE 25 MG/1
25 TABLET, FILM COATED ORAL EVERY 8 HOURS
Status: DISCONTINUED | OUTPATIENT
Start: 2020-07-02 | End: 2020-07-03

## 2020-07-02 RX ORDER — FUROSEMIDE 10 MG/ML
60 INJECTION INTRAMUSCULAR; INTRAVENOUS 2 TIMES DAILY
Status: DISCONTINUED | OUTPATIENT
Start: 2020-07-02 | End: 2020-07-04

## 2020-07-02 RX ORDER — METOPROLOL TARTRATE 25 MG/1
25 TABLET, FILM COATED ORAL 2 TIMES DAILY
Status: DISCONTINUED | OUTPATIENT
Start: 2020-07-02 | End: 2020-07-04 | Stop reason: HOSPADM

## 2020-07-02 RX ORDER — AMLODIPINE BESYLATE 10 MG/1
10 TABLET ORAL DAILY
Status: DISCONTINUED | OUTPATIENT
Start: 2020-07-02 | End: 2020-07-04 | Stop reason: HOSPADM

## 2020-07-02 RX ORDER — AMLODIPINE BESYLATE 5 MG/1
5 TABLET ORAL DAILY
Status: DISCONTINUED | OUTPATIENT
Start: 2020-07-02 | End: 2020-07-02

## 2020-07-02 RX ADMIN — NITROGLYCERIN 85 MCG/MIN: 20 INJECTION INTRAVENOUS at 05:07

## 2020-07-02 RX ADMIN — ATORVASTATIN CALCIUM 40 MG: 40 TABLET, FILM COATED ORAL at 08:07

## 2020-07-02 RX ADMIN — METOPROLOL TARTRATE 25 MG: 25 TABLET, FILM COATED ORAL at 08:07

## 2020-07-02 RX ADMIN — INSULIN ASPART 3 UNITS: 100 INJECTION, SOLUTION INTRAVENOUS; SUBCUTANEOUS at 06:07

## 2020-07-02 RX ADMIN — CLOPIDOGREL BISULFATE 75 MG: 75 TABLET ORAL at 08:07

## 2020-07-02 RX ADMIN — ASPIRIN 81 MG: 81 TABLET, COATED ORAL at 08:07

## 2020-07-02 RX ADMIN — FUROSEMIDE 60 MG: 10 INJECTION, SOLUTION INTRAMUSCULAR; INTRAVENOUS at 04:07

## 2020-07-02 RX ADMIN — HYDRALAZINE HYDROCHLORIDE 25 MG: 25 TABLET, FILM COATED ORAL at 01:07

## 2020-07-02 RX ADMIN — METOPROLOL TARTRATE 25 MG: 25 TABLET, FILM COATED ORAL at 09:07

## 2020-07-02 RX ADMIN — INSULIN DETEMIR 15 UNITS: 100 INJECTION, SOLUTION SUBCUTANEOUS at 09:07

## 2020-07-02 RX ADMIN — INSULIN ASPART 4 UNITS: 100 INJECTION, SOLUTION INTRAVENOUS; SUBCUTANEOUS at 05:07

## 2020-07-02 RX ADMIN — ENOXAPARIN SODIUM 30 MG: 100 INJECTION SUBCUTANEOUS at 04:07

## 2020-07-02 RX ADMIN — HYDRALAZINE HYDROCHLORIDE 25 MG: 25 TABLET, FILM COATED ORAL at 09:07

## 2020-07-02 RX ADMIN — AMLODIPINE BESYLATE 10 MG: 10 TABLET ORAL at 08:07

## 2020-07-02 RX ADMIN — CEFTRIAXONE 1 G: 1 INJECTION, SOLUTION INTRAVENOUS at 07:07

## 2020-07-02 NOTE — SUBJECTIVE & OBJECTIVE
Past Medical History:   Diagnosis Date    Acute hypoxemic respiratory failure 11/26/2018    Anemia     Arthritis     back, hand, knees    Back pain     Cataract     CKD stage G4/A3, GFR 15 - 29 and albumin creatinine ratio >300 mg/g     GFR 40% Jan 2014 and 33% ub 3/2014 ([facility])    Coronary artery disease involving native coronary artery of native heart 11/30/2018    Diabetic retinopathy     DM (diabetes mellitus) type II controlled, neurological manifestation     Exudative age-related macular degeneration of left eye with active choroidal neovascularization 2/5/2019    GERD (gastroesophageal reflux disease)     Glaucoma     Heart failure     Hyperlipidemia     Hypertension     Non-ST elevation MI (NSTEMI) 11/28/2018    NSTEMI (non-ST elevated myocardial infarction) 11/27/2018    Peripheral neuropathy     Polyneuropathy     Proteinuria     >6 mo     Seizures     [facility] 1/2014 -dick CT NRI showed small vessel    Stroke 2012,2014    Tobacco dependence     Type 2 diabetes with peripheral circulatory disorder, controlled        Past Surgical History:   Procedure Laterality Date    BREAST LUMPECTOMY Left     benign, when patient was 15 years old    BREAST MASS EXCISION      ,benign, age 15.    CATHETERIZATION OF BOTH LEFT AND RIGHT HEART N/A 11/28/2018    Procedure: CATHETERIZATION, HEART, BOTH LEFT AND RIGHT;  Surgeon: Michelle Holman MD;  Location: [facility] CATH LAB;  Service: Cardiology;  Laterality: N/A;    CATHETERIZATION OF BOTH LEFT AND RIGHT HEART N/A 11/30/2018    Procedure: CATHETERIZATION, HEART, BOTH LEFT AND RIGHT;  Surgeon: Michelle Holman MD;  Location: [facility] CATH LAB;  Service: Cardiology;  Laterality: N/A;    HYSTERECTOMY  1982    INSERTION OF SHUNT      LEFT HEART CATHETERIZATION Left 12/3/2018    Procedure: CATHETERIZATION, HEART, LEFT;  Surgeon: Michelle Holman MD;  Location: [facility] CATH LAB;  Service: Cardiology;  Laterality: Left;  LAD ATHERECTOMY STENT/ FEMORAL APPROACH     OOPHORECTOMY      wrist cyst Right 1980s    dorsal wrist cyst       Review of patient's allergies indicates:   Allergen Reactions    Codeine Swelling    Darvon [propoxyphene] Swelling       Current Facility-Administered Medications on File Prior to Encounter   Medication    aflibercept Soln 2 mg    [COMPLETED] epoetin roman-epbx injection 40,000 Units     Current Outpatient Medications on File Prior to Encounter   Medication Sig    ACCU-CHEK [place] PLUS METER [facility] USE TO TEST TWICE DAILY    amLODIPine (NORVASC) 5 MG tablet Take 5 mg by mouth once daily.    aspirin (ECOTRIN) 81 MG EC tablet Take 1 tablet (81 mg total) by mouth once daily.    atorvastatin (LIPITOR) 80 MG tablet Take 1 tablet (80 mg total) by mouth once daily. (Patient taking differently: Take 40 mg by mouth once daily. )    BIDIL 20-37.5 mg Tab TAKE 2 TABLETS BY MOUTH THREE TIMES DAILY    blood sugar diagnostic Strp 1 strip by Misc.(Non-Drug; Combo Route) route 3 (three) times daily. relion ultima    calcitRIOL (ROCALTROL) 0.25 MCG Cap Take 1 capsule by mouth once daily    clopidogrel (PLAVIX) 75 mg tablet TAKE 1 TABLET BY MOUTH ONCE DAILY    fluticasone propionate (FLONASE) 50 mcg/actuation nasal spray 1 spray (50 mcg total) by Each Nostril route once daily.    furosemide (LASIX) 20 MG tablet     gabapentin (NEURONTIN) 300 MG capsule Take 1 capsule (300 mg total) by mouth every evening.    insulin NPH-insulin regular, 70/30, (NOVOLIN 70/30) 100 unit/mL (70-30) injection INJECT SUBCUTANEOUSLY 30 UNITS IN THE MORNING AND INJECT 25 UNITS IN THE EVENING (Patient taking differently: INJECT SUBCUTANEOUSLY 30 UNITS IN THE MORNING AND INJECT 25 UNITS IN THE EVENING)    lancets (ACCU-CHEK SOFTCLIX LANCETS) Misc 1 lancet by Misc.(Non-Drug; Combo Route) route 3 (three) times daily.    loratadine (CLARITIN) 10 mg tablet Take 1 tablet (10 mg total) by mouth once daily.    metoprolol tartrate (LOPRESSOR) 25 MG tablet TAKE 1 TABLET BY MOUTH TWICE  DAILY    nitroGLYCERIN (NITROSTAT) 0.4 MG SL tablet Place 1 tablet (0.4 mg total) under the tongue every 5 (five) minutes as needed for Chest pain.    ondansetron (ZOFRAN-ODT) 4 MG TbDL DISSOLVE 1 TABLET IN MOUTH THREE TIMES DAILY AS NEEDED FOR NAUSEA FOR 5 DAYS    sodium bicarbonate 325 MG tablet TAKE 1 TABLET BY MOUTH THREE TIMES DAILY    torsemide (DEMADEX) 20 MG Tab Take 2 tablets (40 mg total) by mouth once daily.     Family History     Problem Relation (Age of Onset)    Cancer Maternal Grandmother    Diabetes Mother    Heart disease Mother    Hyperlipidemia Mother    Hypertension Mother    Muscular dystrophy Son        Tobacco Use    Smoking status: Former Smoker     Packs/day: 1.50     Years: 30.00     Pack years: 45.00     Types: Cigarettes     Quit date: 1990     Years since quittin.4    Smokeless tobacco: Former User   Substance and Sexual Activity    Alcohol use: No     Alcohol/week: 0.0 standard drinks    Drug use: No    Sexual activity: Never     Review of Systems   Constitutional: Positive for activity change. Negative for diaphoresis, fatigue and fever.   HENT: Negative for congestion, dental problem, drooling, ear discharge, ear pain, facial swelling, hearing loss, mouth sores, nosebleeds and postnasal drip.    Eyes: Negative for photophobia, pain, discharge, redness and itching.   Respiratory: Positive for cough, chest tightness and shortness of breath.    Cardiovascular: Positive for leg swelling. Negative for chest pain and palpitations.   Gastrointestinal: Negative for abdominal distention, abdominal pain, anal bleeding, blood in stool, constipation, diarrhea, nausea and rectal pain.   Endocrine: Negative for cold intolerance, heat intolerance, polydipsia and polyphagia.   Genitourinary: Negative for decreased urine volume, difficulty urinating, dyspareunia, dysuria, enuresis, flank pain, frequency, genital sores, hematuria, menstrual problem, pelvic pain and urgency.    Musculoskeletal: Negative for arthralgias, back pain, gait problem, joint swelling, myalgias and neck pain.   Skin: Negative for color change, pallor and rash.   Allergic/Immunologic: Negative for environmental allergies and food allergies.   Neurological: Negative for dizziness, seizures, syncope, facial asymmetry, speech difficulty, light-headedness, numbness and headaches.   Psychiatric/Behavioral: Negative for agitation, behavioral problems, confusion, decreased concentration, dysphoric mood, hallucinations, self-injury and sleep disturbance. The patient is not nervous/anxious and is not hyperactive.      Objective:     Vital Signs (Most Recent):  Temp: 97.8 °F (36.6 °C) (07/01/20 1902)  Pulse: 67 (07/01/20 1902)  Resp: 20 (07/01/20 1902)  BP: (!) 190/67 (07/01/20 1902)  SpO2: 100 % (07/01/20 1902) Vital Signs (24h Range):  Temp:  [97.7 °F (36.5 °C)-97.8 °F (36.6 °C)] 97.8 °F (36.6 °C)  Pulse:  [67-78] 67  Resp:  [16-39] 20  SpO2:  [90 %-100 %] 100 %  BP: (174-235)/() 190/67     Weight: 66.2 kg (146 lb)  Body mass index is 23.57 kg/m².    Physical Exam  Constitutional:       General: She is in acute distress.      Appearance: She is ill-appearing.   HENT:      Head: Normocephalic and atraumatic.      Nose: Nose normal.      Mouth/Throat:      Mouth: Mucous membranes are moist.   Eyes:      Extraocular Movements: Extraocular movements intact.      Conjunctiva/sclera: Conjunctivae normal.      Pupils: Pupils are equal, round, and reactive to light.   Neck:      Musculoskeletal: Normal range of motion and neck supple. No neck rigidity or muscular tenderness.      Vascular: No carotid bruit.   Cardiovascular:      Rate and Rhythm: Normal rate and regular rhythm.      Pulses: Normal pulses.      Heart sounds: Normal heart sounds. No murmur. No friction rub.   Pulmonary:      Effort: Respiratory distress present.      Breath sounds: Rales present.   Abdominal:      General: Abdomen is flat. Bowel sounds are  normal. There is no distension.      Palpations: Abdomen is soft. There is no mass.      Tenderness: There is no abdominal tenderness. There is no guarding or rebound.      Hernia: No hernia is present.   Musculoskeletal: Normal range of motion.         General: Swelling present. No tenderness or deformity.   Skin:     General: Skin is warm.      Coloration: Skin is not jaundiced or pale.   Neurological:      General: No focal deficit present.      Mental Status: She is alert and oriented to person, place, and time. Mental status is at baseline.      Cranial Nerves: No cranial nerve deficit.      Sensory: No sensory deficit.   Psychiatric:         Mood and Affect: Mood normal.           CRANIAL NERVES     CN III, IV, VI   Pupils are equal, round, and reactive to light.       Significant Labs: All pertinent labs within the past 24 hours have been reviewed.    Significant Imaging: I have reviewed all pertinent imaging results/findings within the past 24 hours.

## 2020-07-02 NOTE — SUBJECTIVE & OBJECTIVE
Interval History:     Review of Systems   Constitutional: Positive for activity change. Negative for diaphoresis, fatigue and fever.   HENT: Negative for congestion, dental problem, drooling, ear discharge, ear pain, facial swelling, hearing loss, mouth sores, nosebleeds and postnasal drip.    Eyes: Negative for photophobia, pain, discharge, redness and itching.   Respiratory: Positive for cough, chest tightness and shortness of breath.    Cardiovascular: Positive for leg swelling. Negative for chest pain and palpitations.   Gastrointestinal: Negative for abdominal distention, abdominal pain, anal bleeding, blood in stool, constipation, diarrhea, nausea and rectal pain.   Endocrine: Negative for cold intolerance, heat intolerance, polydipsia and polyphagia.   Genitourinary: Negative for decreased urine volume, difficulty urinating, dyspareunia, dysuria, enuresis, flank pain, frequency, genital sores, hematuria, menstrual problem, pelvic pain and urgency.   Musculoskeletal: Negative for arthralgias, back pain, gait problem, joint swelling, myalgias and neck pain.   Skin: Negative for color change, pallor and rash.   Allergic/Immunologic: Negative for environmental allergies and food allergies.   Neurological: Negative for dizziness, seizures, syncope, facial asymmetry, speech difficulty, light-headedness, numbness and headaches.   Psychiatric/Behavioral: Negative for agitation, behavioral problems, confusion, decreased concentration, dysphoric mood, hallucinations, self-injury and sleep disturbance. The patient is not nervous/anxious and is not hyperactive.      Objective:     Vital Signs (Most Recent):  Temp: 99.3 °F (37.4 °C) (07/02/20 1057)  Pulse: 71 (07/02/20 1131)  Resp: 16 (07/02/20 1131)  BP: (!) 143/65 (07/02/20 1131)  SpO2: 97 % (07/02/20 1131) Vital Signs (24h Range):  Temp:  [97.4 °F (36.3 °C)-99.3 °F (37.4 °C)] 99.3 °F (37.4 °C)  Pulse:  [67-83] 71  Resp:  [15-39] 16  SpO2:  [90 %-100 %] 97 %  BP:  (137-235)/() 143/65     Weight: 66.2 kg (146 lb)  Body mass index is 23.57 kg/m².    Intake/Output Summary (Last 24 hours) at 7/2/2020 1145  Last data filed at 7/2/2020 0550  Gross per 24 hour   Intake 602.8 ml   Output 3000 ml   Net -2397.2 ml      Physical Exam  Constitutional:       General: She is not in acute distress.     Appearance: She is ill-appearing.   HENT:      Head: Normocephalic and atraumatic.      Nose: Nose normal.      Mouth/Throat:      Mouth: Mucous membranes are moist.   Eyes:      Extraocular Movements: Extraocular movements intact.      Conjunctiva/sclera: Conjunctivae normal.      Pupils: Pupils are equal, round, and reactive to light.   Neck:      Musculoskeletal: Normal range of motion and neck supple. No neck rigidity or muscular tenderness.      Vascular: No carotid bruit.   Cardiovascular:      Rate and Rhythm: Normal rate and regular rhythm.      Pulses: Normal pulses.      Heart sounds: Normal heart sounds. No murmur. No friction rub.   Pulmonary:      Effort: No respiratory distress.      Breath sounds: Rales present.   Abdominal:      General: Abdomen is flat. Bowel sounds are normal. There is no distension.      Palpations: Abdomen is soft. There is no mass.      Tenderness: There is no abdominal tenderness. There is no guarding or rebound.      Hernia: No hernia is present.   Musculoskeletal: Normal range of motion.         General: Swelling present. No tenderness or deformity.   Skin:     General: Skin is warm.      Coloration: Skin is not jaundiced or pale.   Neurological:      General: No focal deficit present.      Mental Status: She is alert and oriented to person, place, and time. Mental status is at baseline.      Cranial Nerves: No cranial nerve deficit.      Sensory: No sensory deficit.   Psychiatric:         Mood and Affect: Mood normal.         Significant Labs: All pertinent labs within the past 24 hours have been reviewed.    Significant Imaging: I have reviewed  all pertinent imaging results/findings within the past 24 hours.

## 2020-07-02 NOTE — PROGRESS NOTES
[facility] Hospital Medicine  Progress Note    Patient Name: Avril Monzon  MRN: [id number]  Patient Class: IP- Inpatient   Admission Date: 7/1/2020  Length of Stay: 1 days  Attending Physician: Suhas Prakash, *  Primary Care Provider: Rose Parks MD        Subjective:     Principal Problem:Acute on chronic combined systolic and diastolic congestive heart failure        HPI:  74 y/o AAF  With a PMHx of  CKD stage 5 ,  Systolic and diastolic CHF ,  H/O CAD , Chronic anemia , T2DM  , H/O CVA  And H/O NSTEMI presented to the ER with a C/O  SOB for the last  3 days which has gotten worse . She report the SOB  Is associated  With PND , ALVARADO , dry cough  and B/L LE  .  She denies any  chest pain , fever , Chills , nausea , vomit ,  or GI sx . She has a H/O CKD stage 5  abd Systolic CHF . She  went this am  To see her Hematology for a iron infusion . Pt report start felling more SOB after Iron infusion was started .  ER COURSE: CXR show pulmonary congestion , BNP > 1000, troponin level 0.035 , BUN/Cr 88/5. Pt was started on BIPAP due to  Respiratory distress . Pt with a significant elevated BP and started on NTG drip    ER VS :  BP Pulse Resp Temp SpO2   (!) 235/95 68 (!) 22 97.7 °F (36.5 °C) 97 %               Overview/Hospital Course:  74 y/o aaf admitted to ICU on NIV with a dx of acute on chronic systolic and diastolic CHF exacerbation , Volume overload , Uncontrolled HTN , Acute hypoxic respiratory failure  And CKD stage 5 . She was started on NIV , NTG drip and lasix drip with and improvement of her sx . She is (-) 2.3 lt since admission . She is wean off  BIPAP .   She cont on NTG drip and lasix drip . The BP has improved  . Her home BP medication were resume .     Interval History:     Review of Systems   Constitutional: Positive for activity change. Negative for diaphoresis, fatigue and fever.   HENT: Negative for congestion, dental problem, drooling, ear discharge, ear pain, facial  swelling, hearing loss, mouth sores, nosebleeds and postnasal drip.    Eyes: Negative for photophobia, pain, discharge, redness and itching.   Respiratory: Positive for cough, chest tightness and shortness of breath.    Cardiovascular: Positive for leg swelling. Negative for chest pain and palpitations.   Gastrointestinal: Negative for abdominal distention, abdominal pain, anal bleeding, blood in stool, constipation, diarrhea, nausea and rectal pain.   Endocrine: Negative for cold intolerance, heat intolerance, polydipsia and polyphagia.   Genitourinary: Negative for decreased urine volume, difficulty urinating, dyspareunia, dysuria, enuresis, flank pain, frequency, genital sores, hematuria, menstrual problem, pelvic pain and urgency.   Musculoskeletal: Negative for arthralgias, back pain, gait problem, joint swelling, myalgias and neck pain.   Skin: Negative for color change, pallor and rash.   Allergic/Immunologic: Negative for environmental allergies and food allergies.   Neurological: Negative for dizziness, seizures, syncope, facial asymmetry, speech difficulty, light-headedness, numbness and headaches.   Psychiatric/Behavioral: Negative for agitation, behavioral problems, confusion, decreased concentration, dysphoric mood, hallucinations, self-injury and sleep disturbance. The patient is not nervous/anxious and is not hyperactive.      Objective:     Vital Signs (Most Recent):  Temp: 99.3 °F (37.4 °C) (07/02/20 1057)  Pulse: 71 (07/02/20 1131)  Resp: 16 (07/02/20 1131)  BP: (!) 143/65 (07/02/20 1131)  SpO2: 97 % (07/02/20 1131) Vital Signs (24h Range):  Temp:  [97.4 °F (36.3 °C)-99.3 °F (37.4 °C)] 99.3 °F (37.4 °C)  Pulse:  [67-83] 71  Resp:  [15-39] 16  SpO2:  [90 %-100 %] 97 %  BP: (137-235)/() 143/65     Weight: 66.2 kg (146 lb)  Body mass index is 23.57 kg/m².    Intake/Output Summary (Last 24 hours) at 7/2/2020 1145  Last data filed at 7/2/2020 0550  Gross per 24 hour   Intake 602.8 ml   Output 3000  ml   Net -2397.2 ml      Physical Exam  Constitutional:       General: She is not in acute distress.     Appearance: She is ill-appearing.   HENT:      Head: Normocephalic and atraumatic.      Nose: Nose normal.      Mouth/Throat:      Mouth: Mucous membranes are moist.   Eyes:      Extraocular Movements: Extraocular movements intact.      Conjunctiva/sclera: Conjunctivae normal.      Pupils: Pupils are equal, round, and reactive to light.   Neck:      Musculoskeletal: Normal range of motion and neck supple. No neck rigidity or muscular tenderness.      Vascular: No carotid bruit.   Cardiovascular:      Rate and Rhythm: Normal rate and regular rhythm.      Pulses: Normal pulses.      Heart sounds: Normal heart sounds. No murmur. No friction rub.   Pulmonary:      Effort: No respiratory distress.      Breath sounds: Rales present.   Abdominal:      General: Abdomen is flat. Bowel sounds are normal. There is no distension.      Palpations: Abdomen is soft. There is no mass.      Tenderness: There is no abdominal tenderness. There is no guarding or rebound.      Hernia: No hernia is present.   Musculoskeletal: Normal range of motion.         General: Swelling present. No tenderness or deformity.   Skin:     General: Skin is warm.      Coloration: Skin is not jaundiced or pale.   Neurological:      General: No focal deficit present.      Mental Status: She is alert and oriented to person, place, and time. Mental status is at baseline.      Cranial Nerves: No cranial nerve deficit.      Sensory: No sensory deficit.   Psychiatric:         Mood and Affect: Mood normal.         Significant Labs: All pertinent labs within the past 24 hours have been reviewed.    Significant Imaging: I have reviewed all pertinent imaging results/findings within the past 24 hours.      Assessment/Plan:      * Acute on chronic combined systolic and diastolic congestive heart failure  Wean off NTG drip   Cont  Lasix drip   F/U TTE   Resume BB    Cont CHF core measures       Acute pulmonary edema  2/2 to CHF  Cont diuresis       Coronary artery disease involving native coronary artery of native heart  Cont asa , plavix and statin     Stage 5 chronic kidney disease  Consult nephrology   Pt volume overload . Start lasix drip   Stable   No  Hyperkalemic or acidotic       Pulmonary hypertension  Cont  diuresis  And BP control       Anemia in stage 4 chronic kidney disease  H/H > 8   Stable   Cont monitor       Type 2 diabetes mellitus with circulatory disorder, with long-term current use of insulin  Start ISS  Keep CBG < 180   Start levemir  15 unit qhs       Hypertension associated with diabetes  HTN urgency  2/2 to volume overload  Wean off NTG drip  Increased Norvasc to 10  Mg po qd  Resume metoprolol  Start hydralazine           VTE Risk Mitigation (From admission, onward)         Ordered     enoxaparin injection 30 mg  Every 24 hours      07/01/20 1848     IP VTE HIGH RISK PATIENT  Once      07/01/20 1848     Place sequential compression device  Until discontinued      07/01/20 1848                Critical care time 35 min       Suhas Prakash MD  Department of Hospital Medicine   [facility]

## 2020-07-02 NOTE — PLAN OF CARE
07/02/20 1636   Discharge Assessment   Assessment Type Discharge Planning Assessment   Confirmed/corrected address and phone number on facesheet? Yes   Assessment information obtained from? Caregiver   Prior to hospitilization cognitive status: Alert/Oriented   Prior to hospitalization functional status: Independent   Current cognitive status: Alert/Oriented   Lives With child(robert), adult;spouse   Able to Return to Prior Arrangements yes   Is patient able to care for self after discharge? Yes   Who are your caregiver(s) and their phone number(s)? Elizabeth Monzon (daughter) [phone]   Patient's perception of discharge disposition home or selfcare   Readmission Within the Last 30 Days no previous admission in last 30 days   Patient currently being followed by outpatient case management? No   Patient currently receives any other outside agency services? No   Equipment Currently Used at Home glucometer   Do you have any problems affording any of your prescribed medications? No   Is the patient taking medications as prescribed? yes   Does the patient have transportation home? Yes   Transportation Anticipated family or friend will provide   Does the patient receive services at the [facility]? No   Discharge Plan A Home with family   Discharge Plan B Home with family   Patient/Family in Agreement with Plan yes     Spoke with pt's daughter Elizabeth for DC assessment. Pt lives at home with her  and Elizabeth and uses a glucometer. No CM DC needs identified at this time. SWer provided a transitional care folder, information on advanced directives, information on pharmacy bedside delivery, and discharge planning begins on admission with contact information for any needs/questions. Elizabeth opted for bedside medication delivery. Pt's typical pharmacy is [facility].      [facility] [address] [place] [address] [facility]  [address] [facility] [address]  Phone: [phone] Fax:  [phone]    [facility]  [address] [facility]  [place] [address]  Phone: [phone] Fax: [phone]    [place] [address] - [place] - [address] [facility]  [address] [facility]  [place] [address]  Phone: [phone] Fax: [phone]    PCP: MD Butch Borjas LMSW 7/2/2020 4:39 PM

## 2020-07-02 NOTE — ED NOTES
Pt. Resting in bed. No acute distress, RR equal and non labored, VSS. Bed in low and locked position with call light in reach. Side rails up X 2. Patient on continuous pulse ox, automatic blood pressure cuff and cardiac monitor. Patient tolerating BiPap well. Pfeiffer intact and draining clear yellow urine. Patient denies any needs at this time. Will continue to monitor.

## 2020-07-02 NOTE — PLAN OF CARE
Assumed care from YAS Lou. Tridil and Lasix gtts weaned to off. Lasix push replaced gtts. Pt is AAOX4, NSR, sat 100 % on the monitor @ 4 lpm nasal cannula. Pt BP remain stable with oral meds. Turned q 2 hour, heel elevation maintained. POC reviewed with pt and family, pt appears in no distress, will continue to monitor.

## 2020-07-02 NOTE — ASSESSMENT & PLAN NOTE
Start NTG drip   Start Lasix drip   F/U TTE   Place a arzate for accurate I&O   Started on Bipap

## 2020-07-02 NOTE — ED NOTES
Pt. Resting in bed. No acute distress, RR equal and non labored, VSS. Bed in low and locked position with call light in reach. Side rails up X 2. Patient on continuous pulse ox, automatic blood pressure cuff and cardiac monitor. Patient tolerating BiPap well. Patient denies any needs at this time. Will continue to monitor.

## 2020-07-02 NOTE — HPI
76 y/o AAF  With a PMHx of  CKD stage 5 ,  Systolic and diastolic CHF ,  H/O CAD , Chronic anemia , T2DM  , H/O CVA  And H/O NSTEMI presented to the ER with a C/O  SOB for the last  3 days which has gotten worse . She report the SOB  Is associated  With PND , ALVARADO , dry cough  and B/L LE  .  She denies any  chest pain , fever , Chills , nausea , vomit ,  or GI sx . She has a H/O CKD stage 5  abd Systolic CHF . She  went this am  To see her Hematology for a iron infusion . Pt report start felling more SOB after Iron infusion was started .  ER COURSE: CXR show pulmonary congestion , BNP > 1000, troponin level 0.035 , BUN/Cr 88/5. Pt was started on BIPAP due to  Respiratory distress . Pt with a significant elevated BP and started on NTG drip    ER VS :  BP Pulse Resp Temp SpO2   (!) 235/95 68 (!) 22 97.7 °F (36.5 °C) 97 %

## 2020-07-02 NOTE — ED NOTES
Pt removed from bipap for wean trial and placed on 2l O2 NC. Will reassess for need. RT notified. Pulmonology aware.

## 2020-07-02 NOTE — ASSESSMENT & PLAN NOTE
HTN urgency  2/2 to volume overload  Wean off NTG drip  Increased Norvasc to 10  Mg po qd  Resume metoprolol  Start hydralazine

## 2020-07-02 NOTE — HOSPITAL COURSE
76 y/o aaf admitted to ICU on NIV with a dx of acute on chronic systolic and diastolic CHF exacerbation , Volume overload , Uncontrolled HTN , Acute hypoxic respiratory failure  And CKD stage 5 . She was started on NIV , NTG drip and lasix drip with and improvement of her sx . She is (-) 2.3 lt since admission . She is wean off  BIPAP .   She cont on NTG drip and lasix drip . The BP has improved  . Her home BP medication were resume .   7/3 Pt was seen and examined at bedside . She report feeling better . The BP cont  elevated and medicatioms were changed  . The  BNP is 750 ( down from 1100) The CXR  Show some  vascular congestion ( improved form admission ).    7/4: patient clinically improved. Was able to wean to 2L NC. Home O2 protocol ordered and patient qualified for home O2. Oxygen delivered at bedside. Prescription for 80mg lasix bid ordered and sent to pharmacy. Recommended O2 use at home PRN. Instructed to follow up with cardiology next week.    Patient placed on O2 @ 3lm via nasal cannula per Dr. Melton, for oxygen sats of 88% on ra.

## 2020-07-02 NOTE — CONSULTS
[facility]  Nephrology  Consult Note    Patient Name: Avril Monzon  MRN: [id number]  Admission Date: 7/1/2020  Hospital Length of Stay: 1 days  Attending Provider: Suhas Prakash, *   Primary Care Physician: Rose Parks MD  Principal Problem:Acute on chronic combined systolic and diastolic congestive heart failure    Inpatient consult to Nephrology  Consult performed by: Leonard Bryan MD  Consult ordered by: Suhas Prakash MD  Reason for consult: GREGG on CKD stage 5         Subjective:     HPI:  Avril Monzon is a 75-year-old  woman with history of hypertension, type 2 diabetes, legal blindness, CKD stage 5, baseline creatinine about 5-6 mg/dL, patient has refused to get prepared for dialysis initiation in the past, but seems to agree see a vascular surgeon in the recent past, presents to the emergency room with significant shortness of breath, hypertension, acute decompensated heart failure, patient received IV furosemide, significant urine output about 3 L yesterday, she was on BiPAP overnight, she also was on NTG  drip for hypertension, blood pressure improved this morning with diuresis, resp status improved, her renal function is not far from her baseline, we were consulted for acute on chronic renal failure, acute decompensated heart failure,    Past Medical History:   Diagnosis Date    Acute hypoxemic respiratory failure 11/26/2018    Anemia     Arthritis     back, hand, knees    Back pain     Cataract     CKD stage G4/A3, GFR 15 - 29 and albumin creatinine ratio >300 mg/g     GFR 40% Jan 2014 and 33% ub 3/2014 ([facility])    Coronary artery disease involving native coronary artery of native heart 11/30/2018    Diabetic retinopathy     DM (diabetes mellitus) type II controlled, neurological manifestation     Exudative age-related macular degeneration of left eye with active choroidal neovascularization 2/5/2019    GERD (gastroesophageal reflux  disease)     Glaucoma     Heart failure     Hyperlipidemia     Hypertension     Non-ST elevation MI (NSTEMI) 11/28/2018    NSTEMI (non-ST elevated myocardial infarction) 11/27/2018    Peripheral neuropathy     Polyneuropathy     Proteinuria     >6 mo     Seizures     [facility] 1/2014 -dick CT NRI showed small vessel    Stroke 2012,2014    Tobacco dependence     Type 2 diabetes with peripheral circulatory disorder, controlled        Past Surgical History:   Procedure Laterality Date    BREAST LUMPECTOMY Left     benign, when patient was 15 years old    BREAST MASS EXCISION      ,benign, age 15.    CATHETERIZATION OF BOTH LEFT AND RIGHT HEART N/A 11/28/2018    Procedure: CATHETERIZATION, HEART, BOTH LEFT AND RIGHT;  Surgeon: Michelle Holman MD;  Location: [facility] CATH LAB;  Service: Cardiology;  Laterality: N/A;    CATHETERIZATION OF BOTH LEFT AND RIGHT HEART N/A 11/30/2018    Procedure: CATHETERIZATION, HEART, BOTH LEFT AND RIGHT;  Surgeon: Michelle Holman MD;  Location: [facility] CATH LAB;  Service: Cardiology;  Laterality: N/A;    HYSTERECTOMY  1982    INSERTION OF SHUNT      LEFT HEART CATHETERIZATION Left 12/3/2018    Procedure: CATHETERIZATION, HEART, LEFT;  Surgeon: Michelle Holman MD;  Location: [facility] CATH LAB;  Service: Cardiology;  Laterality: Left;  LAD ATHERECTOMY STENT/ FEMORAL APPROACH    OOPHORECTOMY      wrist cyst Right 1980s    dorsal wrist cyst       Review of patient's allergies indicates:   Allergen Reactions    Codeine Swelling    Darvon [propoxyphene] Swelling     Current Facility-Administered Medications   Medication Frequency    amLODIPine tablet 10 mg Daily    aspirin EC tablet 81 mg Daily    atorvastatin tablet 40 mg Daily    cefTRIAXone (ROCEPHIN) 1 g/50 mL D5W IVPB Q24H    clopidogreL tablet 75 mg Daily    dextrose 50% injection 12.5 g PRN    dextrose 50% injection 25 g PRN    enoxaparin injection 30 mg Q24H    furosemide 1 mg/mL injection 60 mg BID    glucagon (human  recombinant) injection 1 mg PRN    glucose chewable tablet 16 g PRN    glucose chewable tablet 24 g PRN    hydrALAZINE tablet 25 mg Q8H    insulin aspart U-100 pen 0-5 Units QID (AC + HS) PRN    insulin detemir U-100 pen 15 Units QHS    metoprolol tartrate (LOPRESSOR) tablet 25 mg BID    nitroGLYCERIN 50 mg in dextrose 5 % 250 mL infusion (TITRATING) Continuous    ondansetron injection 4 mg Q8H PRN    sodium chloride 0.9% flush 10 mL PRN     Family History     Problem Relation (Age of Onset)    Cancer Maternal Grandmother    Diabetes Mother    Heart disease Mother    Hyperlipidemia Mother    Hypertension Mother    Muscular dystrophy Son        Tobacco Use    Smoking status: Former Smoker     Packs/day: 1.50     Years: 30.00     Pack years: 45.00     Types: Cigarettes     Quit date: 1990     Years since quittin.4    Smokeless tobacco: Former User   Substance and Sexual Activity    Alcohol use: No     Alcohol/week: 0.0 standard drinks    Drug use: No    Sexual activity: Never     Review of Systems   Unable to perform ROS: Mental status change       Lethargic     Objective:     Vital Signs (Most Recent):  Temp: 98.2 °F (36.8 °C) (20 1200)  Pulse: 72 (20 1400)  Resp: 16 (20 1400)  BP: (!) 149/40 (20 1400)  SpO2: 99 % (20 1400)  O2 Device (Oxygen Therapy): nasal cannula (20 1200) Vital Signs (24h Range):  Temp:  [97.4 °F (36.3 °C)-99.3 °F (37.4 °C)] 98.2 °F (36.8 °C)  Pulse:  [67-83] 72  Resp:  [15-39] 16  SpO2:  [89 %-100 %] 99 %  BP: (137-234)/() 149/40     Weight: 66.2 kg (146 lb) (20 0841)  Body mass index is 23.57 kg/m².  Body surface area is 1.76 meters squared.    I/O last 3 completed shifts:  In: 602.8 [P.O.:320; I.V.:232.8; IV Piggyback:50]  Out: 3000 [Urine:3000]    Physical Exam  Constitutional:       General: She is not in acute distress.     Appearance: She is well-developed.   HENT:      Head: Normocephalic and atraumatic.       Mouth/Throat:      Pharynx: No oropharyngeal exudate.   Eyes:      General: No scleral icterus.     Conjunctiva/sclera: Conjunctivae normal.   Neck:      Musculoskeletal: Neck supple.      Thyroid: No thyroid mass or thyromegaly.      Vascular: No carotid bruit or JVD.      Trachea: No tracheal deviation.   Cardiovascular:      Rate and Rhythm: Normal rate and regular rhythm.      Heart sounds: Murmur present. No friction rub. No gallop.    Pulmonary:      Effort: No respiratory distress.      Breath sounds: Rales present. No wheezing.   Chest:      Chest wall: No tenderness.   Abdominal:      General: Bowel sounds are normal. There is no distension or abdominal bruit.      Palpations: Abdomen is soft. There is no mass.      Tenderness: There is no abdominal tenderness. There is no guarding or rebound.   Musculoskeletal: Normal range of motion.         General: No tenderness.   Lymphadenopathy:      Cervical: No cervical adenopathy.   Skin:     General: Skin is warm.      Coloration: Skin is not pale.      Findings: No erythema or rash.   Neurological:      Cranial Nerves: No cranial nerve deficit.      Motor: No abnormal muscle tone.      Coordination: Coordination normal.         Significant Labs:  Cardiac Markers: No results for input(s): CKMB, TROPONINT, MYOGLOBIN in the last 168 hours.  CBC:   Recent Labs   Lab 07/02/20  0527   WBC 8.18   RBC 2.98*   HGB 9.0*   HCT 29.2*      MCV 98   MCH 30.2   MCHC 30.8*     CMP:   Recent Labs   Lab 07/02/20  0527   *  444*   CALCIUM 8.2*  8.2*   ALBUMIN 3.2*  3.2*   PROT 5.9*     137   K 4.2  4.2   CO2 21*  21*     104   BUN 87*  87*   CREATININE 4.7*  4.7*   ALKPHOS 70   ALT 84*   AST 40   BILITOT 0.3     Coagulation: No results for input(s): PT, INR, APTT in the last 168 hours.  LFTs:   Recent Labs   Lab 07/02/20  0527   ALT 84*   AST 40   ALKPHOS 70   BILITOT 0.3   PROT 5.9*   ALBUMIN 3.2*  3.2*     All labs within the past 24 hours  have been reviewed.    Lab Results   Component Value Date    .0 (H) 04/08/2020    CALCIUM 8.2 (L) 07/02/2020    CALCIUM 8.2 (L) 07/02/2020    PHOS 4.3 07/02/2020     Lab Results   Component Value Date    ALBUMIN 3.2 (L) 07/02/2020    ALBUMIN 3.2 (L) 07/02/2020           Significant Imaging: reviewed     Assessment/Plan:     Active Diagnoses:    Diagnosis Date Noted POA    PRINCIPAL PROBLEM:  Acute on chronic combined systolic and diastolic congestive heart failure [I50.43] 12/12/2018 Yes    Acute pulmonary edema [J81.0] 07/01/2020 Yes    Stage 5 chronic kidney disease [N18.5] 11/30/2018 Yes    Coronary artery disease involving native coronary artery of native heart [I25.10] 11/30/2018 Yes    Pulmonary hypertension [I27.20] 11/28/2018 Yes    Anemia in stage 4 chronic kidney disease [N18.4, D63.1] 05/18/2017 Yes     Chronic    Type 2 diabetes mellitus with circulatory disorder, with long-term current use of insulin [E11.59, Z79.4] 11/11/2016 Not Applicable    Hypertension associated with diabetes [E11.59, I10]  Yes     Chronic      Problems Resolved During this Admission:       1. GREGG on CKD stage 5 :  Acute kidney injury due to acute decompensated heart failure, response to IV diuretics, at this point will discontinue Lasix drip, will use intermittent IV boluses, renal function not far from baseline, no acute indication for renal replacement therapy,    2.  Acute decompensated heart failure, clinically improved with diuresis, continue Lasix, fluid restriction of about 1.2 L per day, low-salt diet,    3.  Hypertension, uncontrolled blood pressure on presentation improved with NTG drip and diuresis, at this point Cardene drip can be weaned off, resume home blood pressure medications,    3.  Anemia, multifactorial, monitor hemoglobin, PRBC transfusion when indicated,    4.  Secondary hyperparathyroidism, continue renal diet,     5. Stable lytes     6.  Metabolic acidosis, mild, will monitor at this  point,    7.  Disposition, discharge home when clinically stable    Thank you for your consult. I will follow-up with patient. Please contact us if you have any additional questions.     Total time spent 70 minutes including time needed to review the records,  patient  evaluation, documentation, face-to-face discussion with the patient, primary team,     more than 50% of the time was spent on coordination of care and counseling.       Leonard Bryan MD  Nephrology  [facility]

## 2020-07-02 NOTE — ASSESSMENT & PLAN NOTE
Consult nephrology   Pt volume overload . Start lasix drip   Stable   No  Hyperkalemic or acidotic

## 2020-07-02 NOTE — H&P
[facility] Hospital Medicine  History & Physical    Patient Name: Avril Monzon  MRN: [id number]  Admission Date: 7/1/2020  Attending Physician: Suhas Prakash MD  Primary Care Provider: Rose Parks MD         Patient information was obtained from patient and ER records.     Subjective:     Principal Problem:<principal problem not specified>    Chief Complaint:   Chief Complaint   Patient presents with    Shortness of Breath     pt reports cough and shortness of breath x 3 days        HPI:   76 y/o AAF  With a PMHx of  CKD stage 5 ,  Systolic and diastolic CHF ,  H/O CAD , Chronic anemia , T2DM  , H/O CVA  And H/O NSTEMI presented to the ER with a C/O  SOB for the last  3 days which has gotten worse . She report the SOB  Is associated  With PND , ALVARADO , dry cough  and B/L LE  .  She denies any  chest pain , fever , Chills , nausea , vomit ,  or GI sx . She has a H/O CKD stage 5  abd Systolic CHF . She  went this am  To see her Hematology for a iron infusion . Pt report start felling more SOB after Iron infusion was started .  ER COURSE: CXR show pulmonary congestion , BNP > 1000, troponin level 0.035 , BUN/Cr 88/5. Pt was started on BIPAP due to  Respiratory distress . Pt with a significant elevated BP and started on NTG drip    ER VS :  BP Pulse Resp Temp SpO2   (!) 235/95 68 (!) 22 97.7 °F (36.5 °C) 97 %               Past Medical History:   Diagnosis Date    Acute hypoxemic respiratory failure 11/26/2018    Anemia     Arthritis     back, hand, knees    Back pain     Cataract     CKD stage G4/A3, GFR 15 - 29 and albumin creatinine ratio >300 mg/g     GFR 40% Jan 2014 and 33% ub 3/2014 ([facility])    Coronary artery disease involving native coronary artery of native heart 11/30/2018    Diabetic retinopathy     DM (diabetes mellitus) type II controlled, neurological manifestation     Exudative age-related macular degeneration of left eye with active choroidal neovascularization  2/5/2019    GERD (gastroesophageal reflux disease)     Glaucoma     Heart failure     Hyperlipidemia     Hypertension     Non-ST elevation MI (NSTEMI) 11/28/2018    NSTEMI (non-ST elevated myocardial infarction) 11/27/2018    Peripheral neuropathy     Polyneuropathy     Proteinuria     >6 mo     Seizures     [facility] 1/2014 -dick CT NRI showed small vessel    Stroke 2012,2014    Tobacco dependence     Type 2 diabetes with peripheral circulatory disorder, controlled        Past Surgical History:   Procedure Laterality Date    BREAST LUMPECTOMY Left     benign, when patient was 15 years old    BREAST MASS EXCISION      ,benign, age 15.    CATHETERIZATION OF BOTH LEFT AND RIGHT HEART N/A 11/28/2018    Procedure: CATHETERIZATION, HEART, BOTH LEFT AND RIGHT;  Surgeon: Michelle Holman MD;  Location: [facility] CATH LAB;  Service: Cardiology;  Laterality: N/A;    CATHETERIZATION OF BOTH LEFT AND RIGHT HEART N/A 11/30/2018    Procedure: CATHETERIZATION, HEART, BOTH LEFT AND RIGHT;  Surgeon: Michelle Holman MD;  Location: [facility] CATH LAB;  Service: Cardiology;  Laterality: N/A;    HYSTERECTOMY  1982    INSERTION OF SHUNT      LEFT HEART CATHETERIZATION Left 12/3/2018    Procedure: CATHETERIZATION, HEART, LEFT;  Surgeon: Michelle Holman MD;  Location: [facility] CATH LAB;  Service: Cardiology;  Laterality: Left;  LAD ATHERECTOMY STENT/ FEMORAL APPROACH    OOPHORECTOMY      wrist cyst Right 1980s    dorsal wrist cyst       Review of patient's allergies indicates:   Allergen Reactions    Codeine Swelling    Darvon [propoxyphene] Swelling       Current Facility-Administered Medications on File Prior to Encounter   Medication    aflibercept Soln 2 mg    [COMPLETED] epoetin orman-epbx injection 40,000 Units     Current Outpatient Medications on File Prior to Encounter   Medication Sig    ACCU-CHEK ARDEN PLUS METER Misc USE TO TEST TWICE DAILY    amLODIPine (NORVASC) 5 MG tablet Take 5 mg by mouth once daily.    aspirin  (ECOTRIN) 81 MG EC tablet Take 1 tablet (81 mg total) by mouth once daily.    atorvastatin (LIPITOR) 80 MG tablet Take 1 tablet (80 mg total) by mouth once daily. (Patient taking differently: Take 40 mg by mouth once daily. )    BIDIL 20-37.5 mg Tab TAKE 2 TABLETS BY MOUTH THREE TIMES DAILY    blood sugar diagnostic Strp 1 strip by Misc.(Non-Drug; Combo Route) route 3 (three) times daily. relion ultima    calcitRIOL (ROCALTROL) 0.25 MCG Cap Take 1 capsule by mouth once daily    clopidogrel (PLAVIX) 75 mg tablet TAKE 1 TABLET BY MOUTH ONCE DAILY    fluticasone propionate (FLONASE) 50 mcg/actuation nasal spray 1 spray (50 mcg total) by Each Nostril route once daily.    furosemide (LASIX) 20 MG tablet     gabapentin (NEURONTIN) 300 MG capsule Take 1 capsule (300 mg total) by mouth every evening.    insulin NPH-insulin regular, 70/30, (NOVOLIN 70/30) 100 unit/mL (70-30) injection INJECT SUBCUTANEOUSLY 30 UNITS IN THE MORNING AND INJECT 25 UNITS IN THE EVENING (Patient taking differently: INJECT SUBCUTANEOUSLY 30 UNITS IN THE MORNING AND INJECT 25 UNITS IN THE EVENING)    lancets (ACCU-CHEK SOFTCLIX LANCETS) Misc 1 lancet by Misc.(Non-Drug; Combo Route) route 3 (three) times daily.    loratadine (CLARITIN) 10 mg tablet Take 1 tablet (10 mg total) by mouth once daily.    metoprolol tartrate (LOPRESSOR) 25 MG tablet TAKE 1 TABLET BY MOUTH TWICE DAILY    nitroGLYCERIN (NITROSTAT) 0.4 MG SL tablet Place 1 tablet (0.4 mg total) under the tongue every 5 (five) minutes as needed for Chest pain.    ondansetron (ZOFRAN-ODT) 4 MG TbDL DISSOLVE 1 TABLET IN MOUTH THREE TIMES DAILY AS NEEDED FOR NAUSEA FOR 5 DAYS    sodium bicarbonate 325 MG tablet TAKE 1 TABLET BY MOUTH THREE TIMES DAILY    torsemide (DEMADEX) 20 MG Tab Take 2 tablets (40 mg total) by mouth once daily.     Family History     Problem Relation (Age of Onset)    Cancer Maternal Grandmother    Diabetes Mother    Heart disease Mother    Hyperlipidemia  Mother    Hypertension Mother    Muscular dystrophy Son        Tobacco Use    Smoking status: Former Smoker     Packs/day: 1.50     Years: 30.00     Pack years: 45.00     Types: Cigarettes     Quit date: 1990     Years since quittin.4    Smokeless tobacco: Former User   Substance and Sexual Activity    Alcohol use: No     Alcohol/week: 0.0 standard drinks    Drug use: No    Sexual activity: Never     Review of Systems   Constitutional: Positive for activity change. Negative for diaphoresis, fatigue and fever.   HENT: Negative for congestion, dental problem, drooling, ear discharge, ear pain, facial swelling, hearing loss, mouth sores, nosebleeds and postnasal drip.    Eyes: Negative for photophobia, pain, discharge, redness and itching.   Respiratory: Positive for cough, chest tightness and shortness of breath.    Cardiovascular: Positive for leg swelling. Negative for chest pain and palpitations.   Gastrointestinal: Negative for abdominal distention, abdominal pain, anal bleeding, blood in stool, constipation, diarrhea, nausea and rectal pain.   Endocrine: Negative for cold intolerance, heat intolerance, polydipsia and polyphagia.   Genitourinary: Negative for decreased urine volume, difficulty urinating, dyspareunia, dysuria, enuresis, flank pain, frequency, genital sores, hematuria, menstrual problem, pelvic pain and urgency.   Musculoskeletal: Negative for arthralgias, back pain, gait problem, joint swelling, myalgias and neck pain.   Skin: Negative for color change, pallor and rash.   Allergic/Immunologic: Negative for environmental allergies and food allergies.   Neurological: Negative for dizziness, seizures, syncope, facial asymmetry, speech difficulty, light-headedness, numbness and headaches.   Psychiatric/Behavioral: Negative for agitation, behavioral problems, confusion, decreased concentration, dysphoric mood, hallucinations, self-injury and sleep disturbance. The patient is not  nervous/anxious and is not hyperactive.      Objective:     Vital Signs (Most Recent):  Temp: 97.8 °F (36.6 °C) (07/01/20 1902)  Pulse: 67 (07/01/20 1902)  Resp: 20 (07/01/20 1902)  BP: (!) 190/67 (07/01/20 1902)  SpO2: 100 % (07/01/20 1902) Vital Signs (24h Range):  Temp:  [97.7 °F (36.5 °C)-97.8 °F (36.6 °C)] 97.8 °F (36.6 °C)  Pulse:  [67-78] 67  Resp:  [16-39] 20  SpO2:  [90 %-100 %] 100 %  BP: (174-235)/() 190/67     Weight: 66.2 kg (146 lb)  Body mass index is 23.57 kg/m².    Physical Exam  Constitutional:       General: She is in acute distress.      Appearance: She is ill-appearing.   HENT:      Head: Normocephalic and atraumatic.      Nose: Nose normal.      Mouth/Throat:      Mouth: Mucous membranes are moist.   Eyes:      Extraocular Movements: Extraocular movements intact.      Conjunctiva/sclera: Conjunctivae normal.      Pupils: Pupils are equal, round, and reactive to light.   Neck:      Musculoskeletal: Normal range of motion and neck supple. No neck rigidity or muscular tenderness.      Vascular: No carotid bruit.   Cardiovascular:      Rate and Rhythm: Normal rate and regular rhythm.      Pulses: Normal pulses.      Heart sounds: Normal heart sounds. No murmur. No friction rub.   Pulmonary:      Effort: Respiratory distress present.      Breath sounds: Rales present.   Abdominal:      General: Abdomen is flat. Bowel sounds are normal. There is no distension.      Palpations: Abdomen is soft. There is no mass.      Tenderness: There is no abdominal tenderness. There is no guarding or rebound.      Hernia: No hernia is present.   Musculoskeletal: Normal range of motion.         General: Swelling present. No tenderness or deformity.   Skin:     General: Skin is warm.      Coloration: Skin is not jaundiced or pale.   Neurological:      General: No focal deficit present.      Mental Status: She is alert and oriented to person, place, and time. Mental status is at baseline.      Cranial Nerves: No  cranial nerve deficit.      Sensory: No sensory deficit.   Psychiatric:         Mood and Affect: Mood normal.           CRANIAL NERVES     CN III, IV, VI   Pupils are equal, round, and reactive to light.       Significant Labs: All pertinent labs within the past 24 hours have been reviewed.    Significant Imaging: I have reviewed all pertinent imaging results/findings within the past 24 hours.    Assessment/Plan:     Acute on chronic combined systolic and diastolic congestive heart failure  Start NTG drip   Start Lasix drip   F/U TTE   Place a arzate for accurate I&O   Started on Bipap       Coronary artery disease involving native coronary artery of native heart  Cont asa , plavix and statin     Stage 5 chronic kidney disease  Consult nephrology   Pt volume overload . Start lasix drip       Pulmonary hypertension  Cont agressive diuresis       Anemia in stage 4 chronic kidney disease  H/H > 8   Stable   Cont monitor       Type 2 diabetes mellitus with circulatory disorder, with long-term current use of insulin  Start ISS  Keep CBG < 180       Hypertension associated with diabetes  HTN urgency  2/2 to volume overload  Started on lasix and NTG drip         VTE Risk Mitigation (From admission, onward)         Ordered     enoxaparin injection 30 mg  Every 24 hours      07/01/20 1848     IP VTE HIGH RISK PATIENT  Once      07/01/20 1848     Place sequential compression device  Until discontinued      07/01/20 1848               Critical Care time 75 min    Suhas Prakash MD  Department of Hospital Medicine   [facility]

## 2020-07-02 NOTE — PHYSICIAN QUERY
PT Name: Avril Monzon  MR #: [id number]     Physician Query Form - Documentation Clarification      CDS/: Lauren Quintero RN, CCDS              Contact information: [email or web address]    This form is a permanent document in the medical record.     Query Date: July 2, 2020    By submitting this query, we are merely seeking further clarification of documentation. Please utilize your independent clinical judgment when addressing the question(s) below.    The Medical record reflects the following:    Supporting Clinical Findings Location in Medical Record   History:  Hypertension  Type 2 DM  with peripheral circulatory disorder, controlled  DM type II controlled, neurological manifestation    Hypertension associated with DM     7/1 h/p               7/1 h/p, 7/2 prog note                                                                                      Doctor, Please confirm the meaning of hypertension associated with DM.    Provider Use Only      [   ]  Hypertension is a manifestation of DM (Secondary HTN)  [   ]  Hypertension is not a manifestation of DM (Essential HTN)  [  x ]  Other  (please specify) ___essential HTN__________                                                                                                                           [  ] Clinically Undetermined

## 2020-07-02 NOTE — ED NOTES
Work of breathing become more difficult, pt. Also complaining of coughing becoming worse. Dr. Root notified, recommended BIPAP. Respiratory called for BIPAP

## 2020-07-02 NOTE — ED NOTES
Respiratory at bedside, bipap applied, work of breathing on oxygen saturation improving. Will continue to monitor.

## 2020-07-03 LAB
ALBUMIN SERPL BCP-MCNC: 3.1 G/DL (ref 3.5–5.2)
ALBUMIN SERPL BCP-MCNC: 3.1 G/DL (ref 3.5–5.2)
ALP SERPL-CCNC: 66 U/L (ref 55–135)
ALT SERPL W/O P-5'-P-CCNC: 61 U/L (ref 10–44)
ANION GAP SERPL CALC-SCNC: 14 MMOL/L (ref 8–16)
ANION GAP SERPL CALC-SCNC: 14 MMOL/L (ref 8–16)
AST SERPL-CCNC: 21 U/L (ref 10–40)
BASOPHILS # BLD AUTO: 0.01 K/UL (ref 0–0.2)
BASOPHILS NFR BLD: 0.1 % (ref 0–1.9)
BILIRUB SERPL-MCNC: 0.3 MG/DL (ref 0.1–1)
BNP SERPL-MCNC: 747 PG/ML (ref 0–99)
BUN SERPL-MCNC: 88 MG/DL (ref 8–23)
BUN SERPL-MCNC: 88 MG/DL (ref 8–23)
CALCIUM SERPL-MCNC: 9.1 MG/DL (ref 8.7–10.5)
CALCIUM SERPL-MCNC: 9.1 MG/DL (ref 8.7–10.5)
CHLORIDE SERPL-SCNC: 102 MMOL/L (ref 95–110)
CHLORIDE SERPL-SCNC: 102 MMOL/L (ref 95–110)
CO2 SERPL-SCNC: 24 MMOL/L (ref 23–29)
CO2 SERPL-SCNC: 24 MMOL/L (ref 23–29)
CREAT SERPL-MCNC: 4.8 MG/DL (ref 0.5–1.4)
CREAT SERPL-MCNC: 4.8 MG/DL (ref 0.5–1.4)
DIFFERENTIAL METHOD: ABNORMAL
EOSINOPHIL # BLD AUTO: 0 K/UL (ref 0–0.5)
EOSINOPHIL NFR BLD: 0.4 % (ref 0–8)
ERYTHROCYTE [DISTWIDTH] IN BLOOD BY AUTOMATED COUNT: 12.7 % (ref 11.5–14.5)
EST. GFR  (AFRICAN AMERICAN): 10 ML/MIN/1.73 M^2
EST. GFR  (AFRICAN AMERICAN): 10 ML/MIN/1.73 M^2
EST. GFR  (NON AFRICAN AMERICAN): 8 ML/MIN/1.73 M^2
EST. GFR  (NON AFRICAN AMERICAN): 8 ML/MIN/1.73 M^2
GLUCOSE SERPL-MCNC: 160 MG/DL (ref 70–110)
GLUCOSE SERPL-MCNC: 160 MG/DL (ref 70–110)
HCT VFR BLD AUTO: 29.2 % (ref 37–48.5)
HGB BLD-MCNC: 9.3 G/DL (ref 12–16)
IMM GRANULOCYTES # BLD AUTO: 0.04 K/UL (ref 0–0.04)
IMM GRANULOCYTES NFR BLD AUTO: 0.5 % (ref 0–0.5)
LYMPHOCYTES # BLD AUTO: 0.8 K/UL (ref 1–4.8)
LYMPHOCYTES NFR BLD: 9.6 % (ref 18–48)
MCH RBC QN AUTO: 30.4 PG (ref 27–31)
MCHC RBC AUTO-ENTMCNC: 31.8 G/DL (ref 32–36)
MCV RBC AUTO: 95 FL (ref 82–98)
MONOCYTES # BLD AUTO: 0.5 K/UL (ref 0.3–1)
MONOCYTES NFR BLD: 6.5 % (ref 4–15)
NEUTROPHILS # BLD AUTO: 6.6 K/UL (ref 1.8–7.7)
NEUTROPHILS NFR BLD: 82.9 % (ref 38–73)
NRBC BLD-RTO: 0 /100 WBC
PHOSPHATE SERPL-MCNC: 3.8 MG/DL (ref 2.7–4.5)
PLATELET # BLD AUTO: 185 K/UL (ref 150–350)
PMV BLD AUTO: 10.6 FL (ref 9.2–12.9)
POCT GLUCOSE: 106 MG/DL (ref 70–110)
POCT GLUCOSE: 142 MG/DL (ref 70–110)
POCT GLUCOSE: 190 MG/DL (ref 70–110)
POCT GLUCOSE: 199 MG/DL (ref 70–110)
POCT GLUCOSE: 285 MG/DL (ref 70–110)
POCT GLUCOSE: 316 MG/DL (ref 70–110)
POTASSIUM SERPL-SCNC: 3.8 MMOL/L (ref 3.5–5.1)
POTASSIUM SERPL-SCNC: 3.8 MMOL/L (ref 3.5–5.1)
PROT SERPL-MCNC: 6.3 G/DL (ref 6–8.4)
RBC # BLD AUTO: 3.06 M/UL (ref 4–5.4)
SODIUM SERPL-SCNC: 140 MMOL/L (ref 136–145)
SODIUM SERPL-SCNC: 140 MMOL/L (ref 136–145)
TROPONIN I SERPL DL<=0.01 NG/ML-MCNC: 0.08 NG/ML (ref 0–0.03)
WBC # BLD AUTO: 8.01 K/UL (ref 3.9–12.7)

## 2020-07-03 PROCEDURE — 21400001 HC TELEMETRY ROOM: Mod: HCNC,NTX

## 2020-07-03 PROCEDURE — 99291 CRITICAL CARE FIRST HOUR: CPT | Mod: HCNC,NTX,, | Performed by: INTERNAL MEDICINE

## 2020-07-03 PROCEDURE — 94761 N-INVAS EAR/PLS OXIMETRY MLT: CPT | Mod: HCNC,NTX

## 2020-07-03 PROCEDURE — 25000003 PHARM REV CODE 250: Mod: HCNC,NTX | Performed by: INTERNAL MEDICINE

## 2020-07-03 PROCEDURE — 25000003 PHARM REV CODE 250: Mod: HCNC,NTX | Performed by: NURSE PRACTITIONER

## 2020-07-03 PROCEDURE — 63600175 PHARM REV CODE 636 W HCPCS: Mod: HCNC,NTX | Performed by: INTERNAL MEDICINE

## 2020-07-03 PROCEDURE — 80053 COMPREHEN METABOLIC PANEL: CPT | Mod: HCNC,NTX

## 2020-07-03 PROCEDURE — 36415 COLL VENOUS BLD VENIPUNCTURE: CPT | Mod: HCNC,NTX

## 2020-07-03 PROCEDURE — 99291 PR CRITICAL CARE, E/M 30-74 MINUTES: ICD-10-PCS | Mod: HCNC,NTX,, | Performed by: INTERNAL MEDICINE

## 2020-07-03 PROCEDURE — C9399 UNCLASSIFIED DRUGS OR BIOLOG: HCPCS | Mod: HCNC,NTX | Performed by: INTERNAL MEDICINE

## 2020-07-03 PROCEDURE — 84484 ASSAY OF TROPONIN QUANT: CPT | Mod: HCNC,NTX

## 2020-07-03 PROCEDURE — 83880 ASSAY OF NATRIURETIC PEPTIDE: CPT | Mod: HCNC,NTX

## 2020-07-03 PROCEDURE — 85025 COMPLETE CBC W/AUTO DIFF WBC: CPT | Mod: HCNC,NTX

## 2020-07-03 PROCEDURE — 84100 ASSAY OF PHOSPHORUS: CPT | Mod: HCNC,NTX

## 2020-07-03 PROCEDURE — 99900035 HC TECH TIME PER 15 MIN (STAT): Mod: HCNC,NTX

## 2020-07-03 RX ORDER — ISOSORBIDE DINITRATE AND HYDRALAZINE HYDROCHLORIDE 37.5; 2 MG/1; MG/1
2 TABLET ORAL 3 TIMES DAILY
Status: DISCONTINUED | OUTPATIENT
Start: 2020-07-03 | End: 2020-07-04 | Stop reason: HOSPADM

## 2020-07-03 RX ORDER — HYDRALAZINE HYDROCHLORIDE 20 MG/ML
10 INJECTION INTRAMUSCULAR; INTRAVENOUS EVERY 8 HOURS PRN
Status: DISCONTINUED | OUTPATIENT
Start: 2020-07-03 | End: 2020-07-04 | Stop reason: HOSPADM

## 2020-07-03 RX ORDER — HYDRALAZINE HYDROCHLORIDE 50 MG/1
50 TABLET, FILM COATED ORAL EVERY 8 HOURS
Status: DISCONTINUED | OUTPATIENT
Start: 2020-07-03 | End: 2020-07-03

## 2020-07-03 RX ADMIN — CLOPIDOGREL BISULFATE 75 MG: 75 TABLET ORAL at 08:07

## 2020-07-03 RX ADMIN — METOPROLOL TARTRATE 25 MG: 25 TABLET, FILM COATED ORAL at 08:07

## 2020-07-03 RX ADMIN — FUROSEMIDE 60 MG: 10 INJECTION, SOLUTION INTRAMUSCULAR; INTRAVENOUS at 08:07

## 2020-07-03 RX ADMIN — INSULIN ASPART 4 UNITS: 100 INJECTION, SOLUTION INTRAVENOUS; SUBCUTANEOUS at 04:07

## 2020-07-03 RX ADMIN — CEFTRIAXONE 1 G: 1 INJECTION, SOLUTION INTRAVENOUS at 09:07

## 2020-07-03 RX ADMIN — INSULIN DETEMIR 15 UNITS: 100 INJECTION, SOLUTION SUBCUTANEOUS at 09:07

## 2020-07-03 RX ADMIN — ASPIRIN 81 MG: 81 TABLET, COATED ORAL at 08:07

## 2020-07-03 RX ADMIN — INSULIN ASPART 1 UNITS: 100 INJECTION, SOLUTION INTRAVENOUS; SUBCUTANEOUS at 12:07

## 2020-07-03 RX ADMIN — HYDRALAZINE HYDROCHLORIDE 25 MG: 25 TABLET, FILM COATED ORAL at 04:07

## 2020-07-03 RX ADMIN — HYDRALAZINE HYDROCHLORIDE AND ISOSORBIDE DINITRATE 2 TABLET: 37.5; 2 TABLET, FILM COATED ORAL at 08:07

## 2020-07-03 RX ADMIN — HYDRALAZINE HYDROCHLORIDE AND ISOSORBIDE DINITRATE 2 TABLET: 37.5; 2 TABLET, FILM COATED ORAL at 09:07

## 2020-07-03 RX ADMIN — NITROGLYCERIN 70 MCG/MIN: 20 INJECTION INTRAVENOUS at 04:07

## 2020-07-03 RX ADMIN — FUROSEMIDE 60 MG: 10 INJECTION, SOLUTION INTRAMUSCULAR; INTRAVENOUS at 05:07

## 2020-07-03 RX ADMIN — METOPROLOL TARTRATE 25 MG: 25 TABLET, FILM COATED ORAL at 09:07

## 2020-07-03 RX ADMIN — HYDRALAZINE HYDROCHLORIDE AND ISOSORBIDE DINITRATE 2 TABLET: 37.5; 2 TABLET, FILM COATED ORAL at 02:07

## 2020-07-03 RX ADMIN — ATORVASTATIN CALCIUM 40 MG: 40 TABLET, FILM COATED ORAL at 08:07

## 2020-07-03 RX ADMIN — ENOXAPARIN SODIUM 30 MG: 100 INJECTION SUBCUTANEOUS at 04:07

## 2020-07-03 RX ADMIN — AMLODIPINE BESYLATE 10 MG: 10 TABLET ORAL at 08:07

## 2020-07-03 NOTE — NURSING
4:07 AM  [facility]/P and informed him that pt is back on nitro gtt and b/p remains elevated.  Telephone orders to continue nitro gtt and to give 0600 hydrazine does early

## 2020-07-03 NOTE — SUBJECTIVE & OBJECTIVE
Interval History:     7/3/20: Creatinine stable at 4.8. UOP was 2.3 liters/day.           Review of patient's allergies indicates:   Allergen Reactions    Codeine Swelling    Darvon [propoxyphene] Swelling     Current Facility-Administered Medications   Medication Frequency    amLODIPine tablet 10 mg Daily    aspirin EC tablet 81 mg Daily    atorvastatin tablet 40 mg Daily    cefTRIAXone (ROCEPHIN) 1 g/50 mL D5W IVPB Q24H    clopidogreL tablet 75 mg Daily    dextrose 50% injection 12.5 g PRN    dextrose 50% injection 25 g PRN    enoxaparin injection 30 mg Q24H    furosemide injection 60 mg BID    glucagon (human recombinant) injection 1 mg PRN    glucose chewable tablet 16 g PRN    glucose chewable tablet 24 g PRN    insulin aspart U-100 pen 0-5 Units QID (AC + HS) PRN    insulin detemir U-100 pen 15 Units QHS    isosorbide-hydrALAZINE 20-37.5 mg per tablet 2 tablet TID    metoprolol tartrate (LOPRESSOR) tablet 25 mg BID    ondansetron injection 4 mg Q8H PRN    sodium chloride 0.9% flush 10 mL PRN       Objective:     Vital Signs (Most Recent):  Temp: 98.1 °F (36.7 °C) (07/03/20 0715)  Pulse: 72 (07/03/20 0900)  Resp: 20 (07/03/20 0900)  BP: (!) 133/38 (07/03/20 0900)  SpO2: 100 % (07/03/20 0900)  O2 Device (Oxygen Therapy): nasal cannula (07/03/20 0600) Vital Signs (24h Range):  Temp:  [98.1 °F (36.7 °C)-99.3 °F (37.4 °C)] 98.1 °F (36.7 °C)  Pulse:  [68-81] 72  Resp:  [14-25] 20  SpO2:  [88 %-100 %] 100 %  BP: (133-188)/() 133/38     Weight: 66.2 kg (146 lb) (07/02/20 0841)  Body mass index is 23.57 kg/m².  Body surface area is 1.76 meters squared.    I/O last 3 completed shifts:  In: 977.7 [P.O.:320; I.V.:557.7; IV Piggyback:100]  Out: 5155 [Urine:5155]    Physical Exam  Constitutional:       Appearance: She is well-developed.   HENT:      Head: Normocephalic.   Eyes:      Pupils: Pupils are equal, round, and reactive to light.   Neck:      Thyroid: No thyromegaly.   Cardiovascular:       Rate and Rhythm: Normal rate and regular rhythm.      Heart sounds: No friction rub.   Pulmonary:      Effort: Pulmonary effort is normal.      Breath sounds: Normal breath sounds. No wheezing.   Chest:      Chest wall: No tenderness.   Abdominal:      General: Bowel sounds are normal. There is no distension.      Palpations: Abdomen is soft.      Tenderness: There is no abdominal tenderness.   Lymphadenopathy:      Cervical: No cervical adenopathy.   Skin:     General: Skin is warm and dry.      Findings: No rash.   Neurological:      Mental Status: She is alert and oriented to person, place, and time.         Significant Labs:  Lab Results   Component Value Date    CREATININE 4.8 (H) 07/03/2020    CREATININE 4.8 (H) 07/03/2020    BUN 88 (H) 07/03/2020    BUN 88 (H) 07/03/2020     07/03/2020     07/03/2020    K 3.8 07/03/2020    K 3.8 07/03/2020     07/03/2020     07/03/2020    CO2 24 07/03/2020    CO2 24 07/03/2020     Lab Results   Component Value Date    .0 (H) 04/08/2020    CALCIUM 9.1 07/03/2020    CALCIUM 9.1 07/03/2020    PHOS 3.8 07/03/2020     Lab Results   Component Value Date    ALBUMIN 3.1 (L) 07/03/2020    ALBUMIN 3.1 (L) 07/03/2020     Lab Results   Component Value Date    WBC 8.01 07/03/2020    HGB 9.3 (L) 07/03/2020    HCT 29.2 (L) 07/03/2020    MCV 95 07/03/2020     07/03/2020       Recent Labs   Lab 07/02/20  0527   MG 2.0         Significant Imaging:  Imaging Results          X-Ray Chest AP Portable (Final result)  Result time 07/01/20 14:06:26    Final result by Trev Dean MD (07/01/20 14:06:26)                 Impression:      1.  Moderate vascular congestion with small effusions, Kerley B-lines and cardiac silhouette size enlargement.  Findings most likely represent interstitial pulmonary edema.  Less likely could an interstitial infectious process cause this appearance.    2.  Stable findings as noted above.      Electronically signed by: Trev  MD Dianne  Date:    07/01/2020  Time:    14:06             Narrative:    EXAMINATION:  XR CHEST AP PORTABLE    CLINICAL HISTORY:  Heart failure, unspecified    COMPARISON:  March 26, 2019    FINDINGS:  Small effusions.  Vascular congestion with Kerley B lines.  Moderate basilar interstitial changes.  The upper lungs are clear. The cardiac silhouette size is enlarged.  The trachea is midline and the mediastinal width is normal. Negative for pneumothorax.  Negative for osseous abnormalities. Tortuous aorta with calcifications of the aortic knob.  Degenerative changes of the spine and both shoulder girdles.

## 2020-07-03 NOTE — ASSESSMENT & PLAN NOTE
Patient presented with mild GREGG on CKD 5. Creatinine now back at baseline (Cr was 4.8 today). No need for dialysis at present. Avoid NSAIDs, ACE-I, ARB. Will monitor closely. Good UOP of 2.3 liters/day.   Patient has maturing left arm AVF in place.

## 2020-07-03 NOTE — PROGRESS NOTES
[facility] Hospital Medicine  Progress Note    Patient Name: Avril Monzon  MRN: [id number]  Patient Class: IP- Inpatient   Admission Date: 7/1/2020  Length of Stay: 2 days  Attending Physician: Suhas Prakash, *  Primary Care Provider: Rose Parks MD        Subjective:     Principal Problem:Acute on chronic combined systolic and diastolic congestive heart failure        HPI:  74 y/o AAF  With a PMHx of  CKD stage 5 ,  Systolic and diastolic CHF ,  H/O CAD , Chronic anemia , T2DM  , H/O CVA  And H/O NSTEMI presented to the ER with a C/O  SOB for the last  3 days which has gotten worse . She report the SOB  Is associated  With PND , ALVARADO , dry cough  and B/L LE  .  She denies any  chest pain , fever , Chills , nausea , vomit ,  or GI sx . She has a H/O CKD stage 5  abd Systolic CHF . She  went this am  To see her Hematology for a iron infusion . Pt report start felling more SOB after Iron infusion was started .  ER COURSE: CXR show pulmonary congestion , BNP > 1000, troponin level 0.035 , BUN/Cr 88/5. Pt was started on BIPAP due to  Respiratory distress . Pt with a significant elevated BP and started on NTG drip    ER VS :  BP Pulse Resp Temp SpO2   (!) 235/95 68 (!) 22 97.7 °F (36.5 °C) 97 %               Overview/Hospital Course:  74 y/o aaf admitted to ICU on NIV with a dx of acute on chronic systolic and diastolic CHF exacerbation , Volume overload , Uncontrolled HTN , Acute hypoxic respiratory failure  And CKD stage 5 . She was started on NIV , NTG drip and lasix drip with and improvement of her sx . She is (-) 2.3 lt since admission . She is wean off  BIPAP .   She cont on NTG drip and lasix drip . The BP has improved  . Her home BP medication were resume .   7/3 Pt was seen and examined at bedside . She report feeling better . The BP cont  elevated and medicatioms were changed  . The  BNP is 750 ( down from 1100) The CXR  Show some  vascular congestion ( improved form admission  ).    Interval History:     Review of Systems   Constitutional: Positive for activity change. Negative for diaphoresis, fatigue and fever.   HENT: Negative for congestion, dental problem, drooling, ear discharge, ear pain, facial swelling, hearing loss, mouth sores, nosebleeds and postnasal drip.    Eyes: Negative for photophobia, pain, discharge, redness and itching.   Respiratory: Positive for shortness of breath. Negative for cough and chest tightness.    Cardiovascular: Positive for leg swelling. Negative for chest pain and palpitations.   Gastrointestinal: Negative for abdominal distention, abdominal pain, anal bleeding, blood in stool, constipation, diarrhea, nausea and rectal pain.   Endocrine: Negative for cold intolerance, heat intolerance, polydipsia and polyphagia.   Genitourinary: Negative for decreased urine volume, difficulty urinating, dyspareunia, dysuria, enuresis, flank pain, frequency, genital sores, hematuria, menstrual problem, pelvic pain and urgency.   Musculoskeletal: Negative for arthralgias, back pain, gait problem, joint swelling, myalgias and neck pain.   Skin: Negative for color change, pallor and rash.   Allergic/Immunologic: Negative for environmental allergies and food allergies.   Neurological: Negative for dizziness, seizures, syncope, facial asymmetry, speech difficulty, light-headedness, numbness and headaches.   Psychiatric/Behavioral: Negative for agitation, behavioral problems, confusion, decreased concentration, dysphoric mood, hallucinations, self-injury and sleep disturbance. The patient is not nervous/anxious and is not hyperactive.      Objective:     Vital Signs (Most Recent):  Temp: 98.1 °F (36.7 °C) (07/03/20 0715)  Pulse: 71 (07/03/20 1045)  Resp: 20 (07/03/20 1045)  BP: (!) 166/36 (07/03/20 1045)  SpO2: 99 % (07/03/20 1045) Vital Signs (24h Range):  Temp:  [98.1 °F (36.7 °C)-98.6 °F (37 °C)] 98.1 °F (36.7 °C)  Pulse:  [68-81] 71  Resp:  [14-25] 20  SpO2:  [88 %-100 %]  99 %  BP: (133-188)/() 166/36     Weight: 66.2 kg (146 lb)  Body mass index is 23.57 kg/m².    Intake/Output Summary (Last 24 hours) at 7/3/2020 1116  Last data filed at 7/3/2020 1000  Gross per 24 hour   Intake 399.13 ml   Output 2475 ml   Net -2075.87 ml      Physical Exam  Constitutional:       General: She is not in acute distress.     Appearance: She is ill-appearing.   HENT:      Head: Normocephalic and atraumatic.      Nose: Nose normal.      Mouth/Throat:      Mouth: Mucous membranes are moist.   Eyes:      Extraocular Movements: Extraocular movements intact.      Conjunctiva/sclera: Conjunctivae normal.      Pupils: Pupils are equal, round, and reactive to light.   Neck:      Musculoskeletal: Normal range of motion and neck supple. No neck rigidity or muscular tenderness.      Vascular: No carotid bruit.   Cardiovascular:      Rate and Rhythm: Normal rate and regular rhythm.      Pulses: Normal pulses.      Heart sounds: Normal heart sounds. No murmur. No friction rub.   Pulmonary:      Effort: No respiratory distress.      Breath sounds: Rales present.   Abdominal:      General: Abdomen is flat. Bowel sounds are normal. There is no distension.      Palpations: Abdomen is soft. There is no mass.      Tenderness: There is no abdominal tenderness. There is no guarding or rebound.      Hernia: No hernia is present.   Musculoskeletal: Normal range of motion.         General: Swelling present. No tenderness or deformity.   Skin:     General: Skin is warm.      Coloration: Skin is not jaundiced or pale.   Neurological:      General: No focal deficit present.      Mental Status: She is alert and oriented to person, place, and time. Mental status is at baseline.      Cranial Nerves: No cranial nerve deficit.      Sensory: No sensory deficit.   Psychiatric:         Mood and Affect: Mood normal.         Significant Labs: All pertinent labs within the past 24 hours have been reviewed.    Significant Imaging: I  have reviewed all pertinent imaging results/findings within the past 24 hours.      Assessment/Plan:      * Acute on chronic combined systolic and diastolic congestive heart failure  -S/P NTG drip  And Lasix drip   -TTE  Show normal EF  And DD  -Cont BB  And IV lasix   -Cont CHF core measures  -Improving . Cont IV  Lasix . Possible d/c in 24 to 48 hrs        Acute pulmonary edema  2/2 to CHF  Cont diuresis       Coronary artery disease involving native coronary artery of native heart  Cont asa , plavix and statin     Stage 5 chronic kidney disease  Consult nephrology   Pt volume overload . Start lasix drip   Stable   No  Hyperkalemic or acidotic       Pulmonary hypertension  Cont  diuresis  And BP control       Anemia in stage 4 chronic kidney disease  H/H > 8   Stable   Cont monitor       Type 2 diabetes mellitus with circulatory disorder, with long-term current use of insulin  Start ISS  Keep CBG < 180   Start levemir  15 unit qhs       Hypertension associated with diabetes  HTN urgency  2/2 to volume overload  S/P  NTG drip  Cont  Norvasc to 10  Mg po qd  Cont metoprolol  Start bidil           VTE Risk Mitigation (From admission, onward)         Ordered     enoxaparin injection 30 mg  Every 24 hours      07/01/20 1848     IP VTE HIGH RISK PATIENT  Once      07/01/20 1848     Place sequential compression device  Until discontinued      07/01/20 1848                      Suhas Prakash MD  Department of Hospital Medicine   [facility]

## 2020-07-03 NOTE — NURSING TRANSFER
Nursing Transfer Note      7/3/2020     Transfer To: Tele [address]    Transfer via wheelchair    Transfer with to O2, cardiac monitoring    Transported by YAS GUADARRAMA      Medicines sent: Insulin pens    Chart send with patient: Yes    Notified: daughter    Patient reassessed at: 7/3/2020 1450    Upon arrival to floor: cardiac monitor applied, patient oriented to room, call bell in reach and bed in lowest position. Handoff to YAS Null.

## 2020-07-03 NOTE — SUBJECTIVE & OBJECTIVE
Past Medical History:   Diagnosis Date    Acute hypoxemic respiratory failure 11/26/2018    Anemia     Arthritis     back, hand, knees    Back pain     Cataract     CKD stage G4/A3, GFR 15 - 29 and albumin creatinine ratio >300 mg/g     GFR 40% Jan 2014 and 33% ub 3/2014 ([facility])    Coronary artery disease involving native coronary artery of native heart 11/30/2018    Diabetic retinopathy     DM (diabetes mellitus) type II controlled, neurological manifestation     Exudative age-related macular degeneration of left eye with active choroidal neovascularization 2/5/2019    GERD (gastroesophageal reflux disease)     Glaucoma     Heart failure     Hyperlipidemia     Hypertension     Non-ST elevation MI (NSTEMI) 11/28/2018    NSTEMI (non-ST elevated myocardial infarction) 11/27/2018    Peripheral neuropathy     Polyneuropathy     Proteinuria     >6 mo     Seizures     [facility] 1/2014 -dick CT NRI showed small vessel    Stroke 2012,2014    Tobacco dependence     Type 2 diabetes with peripheral circulatory disorder, controlled        Past Surgical History:   Procedure Laterality Date    BREAST LUMPECTOMY Left     benign, when patient was 15 years old    BREAST MASS EXCISION      ,benign, age 15.    CATHETERIZATION OF BOTH LEFT AND RIGHT HEART N/A 11/28/2018    Procedure: CATHETERIZATION, HEART, BOTH LEFT AND RIGHT;  Surgeon: Michelle Holman MD;  Location: [facility] CATH LAB;  Service: Cardiology;  Laterality: N/A;    CATHETERIZATION OF BOTH LEFT AND RIGHT HEART N/A 11/30/2018    Procedure: CATHETERIZATION, HEART, BOTH LEFT AND RIGHT;  Surgeon: Michelle Holman MD;  Location: [facility] CATH LAB;  Service: Cardiology;  Laterality: N/A;    HYSTERECTOMY  1982    INSERTION OF SHUNT      LEFT HEART CATHETERIZATION Left 12/3/2018    Procedure: CATHETERIZATION, HEART, LEFT;  Surgeon: Michelle Holman MD;  Location: [facility] CATH LAB;  Service: Cardiology;  Laterality: Left;  LAD ATHERECTOMY STENT/ FEMORAL APPROACH     OOPHORECTOMY      wrist cyst Right 1980s    dorsal wrist cyst       Review of patient's allergies indicates:   Allergen Reactions    Codeine Swelling    Darvon [propoxyphene] Swelling       No current facility-administered medications on file prior to encounter.      Current Outpatient Medications on File Prior to Encounter   Medication Sig    ACCU-CHEK ARDEN PLUS METER [facility] USE TO TEST TWICE DAILY    amLODIPine (NORVASC) 5 MG tablet Take 5 mg by mouth once daily.    aspirin (ECOTRIN) 81 MG EC tablet Take 1 tablet (81 mg total) by mouth once daily.    atorvastatin (LIPITOR) 80 MG tablet Take 1 tablet (80 mg total) by mouth once daily. (Patient taking differently: Take 40 mg by mouth once daily. )    BIDIL 20-37.5 mg Tab TAKE 2 TABLETS BY MOUTH THREE TIMES DAILY    blood sugar diagnostic Strp 1 strip by Misc.(Non-Drug; Combo Route) route 3 (three) times daily. relion ultima    calcitRIOL (ROCALTROL) 0.25 MCG Cap Take 1 capsule by mouth once daily    clopidogrel (PLAVIX) 75 mg tablet TAKE 1 TABLET BY MOUTH ONCE DAILY    fluticasone propionate (FLONASE) 50 mcg/actuation nasal spray 1 spray (50 mcg total) by Each Nostril route once daily.    furosemide (LASIX) 20 MG tablet     gabapentin (NEURONTIN) 300 MG capsule Take 1 capsule (300 mg total) by mouth every evening.    insulin NPH-insulin regular, 70/30, (NOVOLIN 70/30) 100 unit/mL (70-30) injection INJECT SUBCUTANEOUSLY 30 UNITS IN THE MORNING AND INJECT 25 UNITS IN THE EVENING (Patient taking differently: INJECT SUBCUTANEOUSLY 30 UNITS IN THE MORNING AND INJECT 25 UNITS IN THE EVENING)    lancets (ACCU-CHEK SOFTCLIX LANCETS) Misc 1 lancet by Misc.(Non-Drug; Combo Route) route 3 (three) times daily.    loratadine (CLARITIN) 10 mg tablet Take 1 tablet (10 mg total) by mouth once daily.    metoprolol tartrate (LOPRESSOR) 25 MG tablet TAKE 1 TABLET BY MOUTH TWICE DAILY    nitroGLYCERIN (NITROSTAT) 0.4 MG SL tablet Place 1 tablet (0.4 mg total) under  the tongue every 5 (five) minutes as needed for Chest pain.    ondansetron (ZOFRAN-ODT) 4 MG TbDL DISSOLVE 1 TABLET IN MOUTH THREE TIMES DAILY AS NEEDED FOR NAUSEA FOR 5 DAYS    sodium bicarbonate 325 MG tablet TAKE 1 TABLET BY MOUTH THREE TIMES DAILY    torsemide (DEMADEX) 20 MG Tab Take 2 tablets (40 mg total) by mouth once daily.     Family History     Problem Relation (Age of Onset)    Cancer Maternal Grandmother    Diabetes Mother    Heart disease Mother    Hyperlipidemia Mother    Hypertension Mother    Muscular dystrophy Son        Tobacco Use    Smoking status: Former Smoker     Packs/day: 1.50     Years: 30.00     Pack years: 45.00     Types: Cigarettes     Quit date: 1990     Years since quittin.5    Smokeless tobacco: Former User   Substance and Sexual Activity    Alcohol use: No     Alcohol/week: 0.0 standard drinks    Drug use: No    Sexual activity: Never     Review of Systems   Constitution: Positive for decreased appetite, malaise/fatigue and weight gain.   HENT: Negative.    Eyes: Negative.    Cardiovascular: Positive for dyspnea on exertion and leg swelling. Negative for chest pain.   Respiratory: Positive for cough and shortness of breath.    Endocrine: Negative.    Hematologic/Lymphatic: Negative.    Skin: Negative.    Gastrointestinal: Negative.    Genitourinary: Negative.    Neurological: Negative.    Psychiatric/Behavioral: Negative.    Allergic/Immunologic: Negative.      Objective:     Vital Signs (Most Recent):  Temp: 98.1 °F (36.7 °C) (20 0715)  Pulse: 71 (20 1045)  Resp: 20 (20 1045)  BP: (!) 166/36 (20 1045)  SpO2: 99 % (20 1045) Vital Signs (24h Range):  Temp:  [98.1 °F (36.7 °C)-98.6 °F (37 °C)] 98.1 °F (36.7 °C)  Pulse:  [68-81] 71  Resp:  [14-25] 20  SpO2:  [88 %-100 %] 99 %  BP: (133-188)/() 166/36     Weight: 66.2 kg (146 lb)  Body mass index is 23.57 kg/m².    SpO2: 99 %  O2 Device (Oxygen Therapy): nasal  cannula      Intake/Output Summary (Last 24 hours) at 7/3/2020 1155  Last data filed at 7/3/2020 1000  Gross per 24 hour   Intake 399.13 ml   Output 2475 ml   Net -2075.87 ml       Lines/Drains/Airways     Drain                 Urethral Catheter 07/01/20 1901 Latex 16 Fr. 1 day          Peripheral Intravenous Line                 Peripheral IV - Single Lumen 07/01/20 1614 20 G Right Hand 1 day         Peripheral IV - Single Lumen 07/01/20 1730 20 G Right Forearm 1 day                Physical Exam   Constitutional: She is oriented to person, place, and time. She appears well-developed and well-nourished. No distress.   HENT:   Head: Normocephalic and atraumatic.   Nose: Nose normal.   Mouth/Throat: Oropharynx is clear and moist.   Eyes: Conjunctivae and EOM are normal. No scleral icterus.   Neck: Normal range of motion. Neck supple. No thyromegaly present.   Cardiovascular: Normal rate, regular rhythm, S1 normal and S2 normal. Exam reveals no gallop, no S3, no S4 and no friction rub.   Murmur heard.  Pulmonary/Chest: Effort normal and breath sounds normal. No stridor. No respiratory distress. She has no wheezes. She has no rales. She exhibits no tenderness.   Abdominal: Soft. Bowel sounds are normal. She exhibits no distension and no mass. There is no abdominal tenderness. There is no rebound.   Genitourinary:    Genitourinary Comments: Deferred     Musculoskeletal: Normal range of motion.         General: Edema (trace - resolved) present. No tenderness or deformity.   Lymphadenopathy:     She has no cervical adenopathy.   Neurological: She is alert and oriented to person, place, and time. She exhibits normal muscle tone. Coordination normal.   Skin: Skin is warm and dry. No rash noted. She is not diaphoretic. No erythema. No pallor.   Psychiatric: She has a normal mood and affect. Her behavior is normal. Judgment and thought content normal.   Nursing note and vitals reviewed.      Significant Labs:   All pertinent  lab results from the last 24 hours have been reviewed. and   Recent Lab Results       07/03/20  1120   07/03/20  0801   07/03/20  0552   07/03/20  0334   07/03/20  0031        Albumin       3.1              3.1       Alkaline Phosphatase       66       ALT       61       Anion Gap       14              14       AST       21       Baso #       0.01       Basophil%       0.1       BILIRUBIN TOTAL       0.3  Comment:  For infants and newborns, interpretation of results should be based  on gestational age, weight and in agreement with clinical  observations.  Premature Infant recommended reference ranges:  Up to 24 hours.............<8.0 mg/dL  Up to 48 hours............<12.0 mg/dL  3-5 days..................<15.0 mg/dL  6-29 days.................<15.0 mg/dL         BNP       747  Comment:  Values of less than 100 pg/ml are consistent with non-CHF populations.       BUN, Bld       88              88       Calcium       9.1              9.1       Chloride       102              102       CO2       24              24       Creatinine       4.8              4.8       Differential Method       Automated       eGFR if        10              10       eGFR if non        8  Comment:  Calculation used to obtain the estimated glomerular filtration  rate (eGFR) is the CKD-EPI equation.                 8  Comment:  Calculation used to obtain the estimated glomerular filtration  rate (eGFR) is the CKD-EPI equation.          Eos #       0.0       Eosinophil%       0.4       Glucose       160              160       Gran # (ANC)       6.6       Gran%       82.9       Hematocrit       29.2       Hemoglobin       9.3       Immature Grans (Abs)       0.04  Comment:  Mild elevation in immature granulocytes is non specific and   can be seen in a variety of conditions including stress response,   acute inflammation, trauma and pregnancy. Correlation with other   laboratory and clinical findings is essential.          Immature Granulocytes       0.5       Lymph #       0.8       Lymph%       9.6       MCH       30.4       MCHC       31.8       MCV       95       Mono #       0.5       Mono%       6.5       MPV       10.6       nRBC       0       Phosphorus       3.8       Platelets       185       POCT Glucose 199 106 142   285     Potassium       3.8              3.8       PROTEIN TOTAL       6.3       RBC       3.06       RDW       12.7       Sodium       140              140       Troponin I               WBC       8.01                        07/02/20  1727   07/02/20  1710   07/02/20  1242        Albumin           Alkaline Phosphatase           ALT           Anion Gap           AST           Baso #           Basophil%           BILIRUBIN TOTAL           BNP           BUN, Bld           Calcium           Chloride           CO2           Creatinine           Differential Method           eGFR if            eGFR if non            Eos #           Eosinophil%           Glucose           Gran # (ANC)           Gran%           Hematocrit           Hemoglobin           Immature Grans (Abs)           Immature Granulocytes           Lymph #           Lymph%           MCH           MCHC           MCV           Mono #           Mono%           MPV           nRBC           Phosphorus           Platelets           POCT Glucose 305         Potassium           PROTEIN TOTAL           RBC           RDW           Sodium           Troponin I   0.090  Comment:  The reference interval for Troponin I represents the 99th percentile   cutoff   for our facility and is consistent with 3rd generation assay   performance.   0.104  Comment:  The reference interval for Troponin I represents the 99th percentile   cutoff   for our facility and is consistent with 3rd generation assay   performance.       WBC                 Significant Imaging: Echocardiogram:   2D echo with color flow doppler:   Results for orders  placed or performed during the hospital encounter of 11/26/18   2D echo with color flow doppler   Result Value Ref Range    QEF 45 55 - 65    Mitral Valve Regurgitation MILD TO MODERATE     Diastolic Dysfunction Yes (A)     Est. PA Systolic Pressure 69.15 (A)     Tricuspid Valve Regurgitation MILD     Narrative    Date of Procedure: 11/27/2018        TEST DESCRIPTION   Technical Quality: This is a portable study performed at the patient's bedside. This is a technically challenging study. There is poor endocardial definition.     Aorta: The aortic root is normal in size, measuring 2.6 cm at sinotubular junction and 2.6 cm at Sinuses of Valsalva. The proximal ascending aorta is normal in size, measuring 2.6 cm across.     Left Atrium: The left atrial volume index is normal, measuring 31.92 cc/m2.     Left Ventricle: The left ventricle is normal in size, with an end-diastolic diameter of 4.6 cm, and an end-systolic diameter of 3.2 cm. LV wall thickness is normal, with the septum measuring 1.0 cm and the posterior wall measuring 0.9 cm across. Relative   wall thickness was normal at 0.39, and the LV mass index was 92.3 g/m2 consistent with normal left ventricular mass. The following segments were akinetic: apical septum, apical inferior wall.  The following segments were moderately hypokinetic: mid inferoseptum.  The following segments were severely hypokinetic: mid anteroseptum.  Left ventricular systolic function appears mildly depressed. Visually estimated ejection fraction is 45-50%. The LV Doppler derived stroke volume equals 99.0 ccs.     Diastolic indices: E wave velocity 1.2 m/s, E/A ratio 1.3,  msec., E/e' ratio(avg) 13. There is diastolic dysfunction secondary to relaxation abnormality.     Right Atrium: The right atrium is normal in size, measuring 4.0 cm in length and 3.0 cm in width in the apical view.     Right Ventricle: The right ventricle is normal in size. Global right ventricular systolic  function appears normal. Tricuspid annular plane systolic excursion (TAPSE) is 2.0 cm. The estimated PA systolic pressure is 69 mmHg.     Aortic Valve:  The peak gradient obtained across the aortic valve is 13 mmHg, with a mean gradient of 7 mmHg. Using a left ventricular outflow tract diameter of 2.0 cm, a left ventricular outflow tract velocity time integral of 33 cm, and a peak   instantaneous transvalvular velocity time integral of 34 cm, the calculated aortic valve area is 2.91 cm2(AVAi is 1.57 cm2/m2).     Mitral Valve:  The pressure half time is 42 msec. The calculated mitral valve area is 5.24 cm2. There is mild to moderate mitral regurgitation.     Tricuspid Valve:  There is mild tricuspid regurgitation.     IVC: IVC is enlarged but collapses > 50% with a sniff, suggesting intermediate right atrial pressure of 8 mmHg.     Atrial Septum: The atrial septum is intact.     Intracavitary: There is no evidence of pericardial effusion, intracavity mass, thrombi, or vegetation.     Other: There is a right pleural effusion present.         CONCLUSIONS     1 - Wall motion abnormalities.     2 - Mildly depressed left ventricular systolic function (EF 45-50%).     3 - Impaired LV relaxation, normal LAP (grade 1 diastolic dysfunction).     4 - Normal right ventricular systolic function .     5 - Pulmonary hypertension. The estimated PA systolic pressure is 69 mmHg.     6 - Mild to moderate mitral regurgitation.     7 - Mild tricuspid regurgitation.     8 - Intermediate central venous pressure.     9 - Right pleural effusion.             This document has been electronically    SIGNED BY: Michelle Holman MD On: 11/27/2018 10:58    This document was originally electronically signed on: 11/27/2018 10:57    and X-Ray: CXR: X-Ray Chest 1 View (CXR):   Results for orders placed or performed during the hospital encounter of 07/01/20   X-Ray Chest 1 View    Narrative    EXAMINATION:  XR CHEST 1 VIEW    CLINICAL  HISTORY:  sob;    COMPARISON:  July 1, 2020    FINDINGS:  Worsening small effusions.  Interval development of a ground-glass opacity in the right upper lung.  Similar degree of vascular congestion with less prominent Kerley B-lines currently.  The hilar and mediastinal contours and osseous structures are otherwise unchanged.  Stable cardiac silhouette size enlargement, tortuosity of the aorta and calcifications of the aortic knob.      Impression    1.  Interval development of ground-glass opacity within the right upper lung.  Worsening left effusion.  Stable small right effusion.  Similar degree of vascular congestion with improved Kerley B lines.  Stable cardiac silhouette size enlargement.  Again, interstitial pulmonary edema versus interstitial infectious process must be considered.      Electronically signed by: Trev Dean MD  Date:    07/03/2020  Time:    08:01

## 2020-07-03 NOTE — PLAN OF CARE
Pt AAO x4.  Pt remains free of falls throughout shift.  Pt denies pain throughout shift.  Plan of care reviewed. Pt verbalizes understanding.  Pt moving and turning independently. Frequent weight shifting encouraged.  Normal sinus rhythm on monitor.  Hourly rounding completed.  Blind to R eye.  4L NC. Tolerating well.  Will continue to monitor.

## 2020-07-03 NOTE — PROGRESS NOTES
[facility] -   Nephrology  Progress Note    Patient Name: Avril Monzon  MRN: [id number]  Admission Date: 7/1/2020  Hospital Length of Stay: 2 days  Attending Provider: Suhas Prakash, *   Primary Care Physician: Rose Parks MD  Principal Problem:Acute on chronic combined systolic and diastolic congestive heart failure    Subjective:     HPI: HPI:  Avril Monzon is a 75-year-old  woman with history of hypertension, type 2 diabetes, legal blindness, CKD stage 5, baseline creatinine about 5-6 mg/dL, patient has refused to get prepared for dialysis initiation in the past, but seems to agree see a vascular surgeon in the recent past, presents to the emergency room with significant shortness of breath, hypertension, acute decompensated heart failure, patient received IV furosemide, significant urine output about 3 L yesterday, she was on BiPAP overnight, she also was on NTG  drip for hypertension, blood pressure improved this morning with diuresis, resp status improved, her renal function is not far from her baseline, we were consulted for acute on chronic renal failure, acute decompensated heart failure.     Interval History:     7/3/20: Creatinine stable at 4.8. UOP was 2.3 liters/day.           Review of patient's allergies indicates:   Allergen Reactions    Codeine Swelling    Darvon [propoxyphene] Swelling     Current Facility-Administered Medications   Medication Frequency    amLODIPine tablet 10 mg Daily    aspirin EC tablet 81 mg Daily    atorvastatin tablet 40 mg Daily    cefTRIAXone (ROCEPHIN) 1 g/50 mL D5W IVPB Q24H    clopidogreL tablet 75 mg Daily    dextrose 50% injection 12.5 g PRN    dextrose 50% injection 25 g PRN    enoxaparin injection 30 mg Q24H    furosemide injection 60 mg BID    glucagon (human recombinant) injection 1 mg PRN    glucose chewable tablet 16 g PRN    glucose chewable tablet 24 g PRN    insulin aspart U-100 pen 0-5 Units QID (AC  + HS) PRN    insulin detemir U-100 pen 15 Units QHS    isosorbide-hydrALAZINE 20-37.5 mg per tablet 2 tablet TID    metoprolol tartrate (LOPRESSOR) tablet 25 mg BID    ondansetron injection 4 mg Q8H PRN    sodium chloride 0.9% flush 10 mL PRN       Objective:     Vital Signs (Most Recent):  Temp: 98.1 °F (36.7 °C) (07/03/20 0715)  Pulse: 72 (07/03/20 0900)  Resp: 20 (07/03/20 0900)  BP: (!) 133/38 (07/03/20 0900)  SpO2: 100 % (07/03/20 0900)  O2 Device (Oxygen Therapy): nasal cannula (07/03/20 0600) Vital Signs (24h Range):  Temp:  [98.1 °F (36.7 °C)-99.3 °F (37.4 °C)] 98.1 °F (36.7 °C)  Pulse:  [68-81] 72  Resp:  [14-25] 20  SpO2:  [88 %-100 %] 100 %  BP: (133-188)/() 133/38     Weight: 66.2 kg (146 lb) (07/02/20 0841)  Body mass index is 23.57 kg/m².  Body surface area is 1.76 meters squared.    I/O last 3 completed shifts:  In: 977.7 [P.O.:320; I.V.:557.7; IV Piggyback:100]  Out: 5155 [Urine:5155]    Physical Exam  Constitutional:       Appearance: She is well-developed.   HENT:      Head: Normocephalic.   Eyes:      Pupils: Pupils are equal, round, and reactive to light.   Neck:      Thyroid: No thyromegaly.   Cardiovascular:      Rate and Rhythm: Normal rate and regular rhythm.      Heart sounds: No friction rub.   Pulmonary:      Effort: Pulmonary effort is normal.      Breath sounds: Normal breath sounds. No wheezing.   Chest:      Chest wall: No tenderness.   Abdominal:      General: Bowel sounds are normal. There is no distension.      Palpations: Abdomen is soft.      Tenderness: There is no abdominal tenderness.   Lymphadenopathy:      Cervical: No cervical adenopathy.   Skin:     General: Skin is warm and dry.      Findings: No rash.   Neurological:      Mental Status: She is alert and oriented to person, place, and time.         Significant Labs:  Lab Results   Component Value Date    CREATININE 4.8 (H) 07/03/2020    CREATININE 4.8 (H) 07/03/2020    BUN 88 (H) 07/03/2020    BUN 88 (H)  07/03/2020     07/03/2020     07/03/2020    K 3.8 07/03/2020    K 3.8 07/03/2020     07/03/2020     07/03/2020    CO2 24 07/03/2020    CO2 24 07/03/2020     Lab Results   Component Value Date    .0 (H) 04/08/2020    CALCIUM 9.1 07/03/2020    CALCIUM 9.1 07/03/2020    PHOS 3.8 07/03/2020     Lab Results   Component Value Date    ALBUMIN 3.1 (L) 07/03/2020    ALBUMIN 3.1 (L) 07/03/2020     Lab Results   Component Value Date    WBC 8.01 07/03/2020    HGB 9.3 (L) 07/03/2020    HCT 29.2 (L) 07/03/2020    MCV 95 07/03/2020     07/03/2020       Recent Labs   Lab 07/02/20  0527   MG 2.0         Significant Imaging:  Imaging Results          X-Ray Chest AP Portable (Final result)  Result time 07/01/20 14:06:26    Final result by Trev Dean MD (07/01/20 14:06:26)                 Impression:      1.  Moderate vascular congestion with small effusions, Kerley B-lines and cardiac silhouette size enlargement.  Findings most likely represent interstitial pulmonary edema.  Less likely could an interstitial infectious process cause this appearance.    2.  Stable findings as noted above.      Electronically signed by: Trev Dean MD  Date:    07/01/2020  Time:    14:06             Narrative:    EXAMINATION:  XR CHEST AP PORTABLE    CLINICAL HISTORY:  Heart failure, unspecified    COMPARISON:  March 26, 2019    FINDINGS:  Small effusions.  Vascular congestion with Kerley B lines.  Moderate basilar interstitial changes.  The upper lungs are clear. The cardiac silhouette size is enlarged.  The trachea is midline and the mediastinal width is normal. Negative for pneumothorax.  Negative for osseous abnormalities. Tortuous aorta with calcifications of the aortic knob.  Degenerative changes of the spine and both shoulder girdles.                                  Assessment/Plan:     * Acute on chronic combined systolic and diastolic congestive heart failure  Continue Lasix diuresis.     Stage 5  chronic kidney disease  Patient presented with mild GREGG on CKD 5. Creatinine now back at baseline (Cr was 4.8 today). No need for dialysis at present. Avoid NSAIDs, ACE-I, ARB. Will monitor closely. Good UOP of 2.3 liters/day.   Patient has maturing left arm AVF in place.    Anemia in stage 4 chronic kidney disease  Hgb at 9.3 today, monitor.       Total ICU time: more than 30 minutes. Majority of effort was employed for chart review and case discussion with ICU team and patient.       Thank you for your consult. I will follow-up with patient. Please contact us if you have any additional questions.    Dudley Hernandez MD  Nephrology  [facility]

## 2020-07-03 NOTE — ASSESSMENT & PLAN NOTE
HTN urgency  2/2 to volume overload  S/P  NTG drip  Cont  Norvasc to 10  Mg po qd  Cont metoprolol  Start bidil

## 2020-07-03 NOTE — ASSESSMENT & PLAN NOTE
-S/P NTG drip  And Lasix drip   -TTE  Show normal EF  And DD  -Cont BB  And IV lasix   -Cont CHF core measures  -Improving . Cont IV  Lasix . Possible d/c in 24 to 48 hrs

## 2020-07-03 NOTE — EICU
EICU    Called about elevated bp in pt admitted with diast heart failure on nitro gtt and lasix gtt earlier per nursing, both were turned off and pt given outpt po medications.  Sbp 180s hr 70s pt asleep no pain no symtpoms  - restarted nitro gtt at 100 mcg (had peaked at 70); should be able to wean off as oral meds taken again  - give hydralazine dose 2 hours early  - current bp at 0710 is 158/43 (syst hypertension? No hx AI, fever or sepsis, thyroid dz), most recorded bps since admit have pulse pressure >90

## 2020-07-03 NOTE — CONSULTS
[facility] -   Cardiology  Consult Note    Patient Name: Avril Monzon  MRN: [id number]  Admission Date: 7/1/2020  Hospital Length of Stay: 2 days  Code Status: Full Code   Attending Provider: Suhas Prakash, *   Consulting Provider: Demetrio Mejia Md, MD  Primary Care Physician: Rose Parks MD  Principal Problem:Acute on chronic combined systolic and diastolic congestive heart failure    Patient information was obtained from patient, past medical records and ER records.     Inpatient consult to Cardiology  Consult performed by: Demetrio Mejia MD  Consult ordered by: Dudley Hernandez MD  Reason for consult: CHF        Subjective:     Chief Complaint:  SOB/ALVARADO     HPI:   Avril Monzon is a 75 y.o. female pt admitted to ICU on NIV with a dx of acute on chronic systolic and diastolic CHF exacerbation , Volume overload , Uncontrolled HTN , Acute hypoxic respiratory failure  And CKD stage 5 . She was started on NIV , NTG drip and lasix drip with and improvement of her sx . She is (-) 2.3 lt since admission . She is wean off  BIPAP .   She cont on NTG drip and lasix drip . The BP has improved  . Her home BP medication were resume .   She has improved with IV diuresis, cardiology consulted for CHF. Discussed will need volume removal and will work up pulm HTN as outpt. Pt feels well now likely will be DC home.     Results for orders placed during the hospital encounter of 07/01/20   Echo Color Flow Doppler? Yes    Narrative · Concentric left ventricular hypertrophy.  · Normal left ventricular systolic function. The estimated ejection   fraction is 60%.  · Grade II (moderate) left ventricular diastolic dysfunction consistent   with pseudonormalization.  · Normal right ventricular systolic function.  · Mild mitral regurgitation.  · Mild to moderate tricuspid regurgitation.  · Mild pulmonic regurgitation.  · Moderate mitral prolapse of the anterior mitral leaflet.  · Normal central venous pressure (3  mmHg).  · The estimated PA systolic pressure is 68 mmHg.  · Pulmonary hypertension present.          Past Medical History:   Diagnosis Date    Acute hypoxemic respiratory failure 11/26/2018    Anemia     Arthritis     back, hand, knees    Back pain     Cataract     CKD stage G4/A3, GFR 15 - 29 and albumin creatinine ratio >300 mg/g     GFR 40% Jan 2014 and 33% ub 3/2014 ([facility])    Coronary artery disease involving native coronary artery of native heart 11/30/2018    Diabetic retinopathy     DM (diabetes mellitus) type II controlled, neurological manifestation     Exudative age-related macular degeneration of left eye with active choroidal neovascularization 2/5/2019    GERD (gastroesophageal reflux disease)     Glaucoma     Heart failure     Hyperlipidemia     Hypertension     Non-ST elevation MI (NSTEMI) 11/28/2018    NSTEMI (non-ST elevated myocardial infarction) 11/27/2018    Peripheral neuropathy     Polyneuropathy     Proteinuria     >6 mo     Seizures     [facility] 1/2014 -dick CT NRI showed small vessel    Stroke 2012,2014    Tobacco dependence     Type 2 diabetes with peripheral circulatory disorder, controlled        Past Surgical History:   Procedure Laterality Date    BREAST LUMPECTOMY Left     benign, when patient was 15 years old    BREAST MASS EXCISION      ,benign, age 15.    CATHETERIZATION OF BOTH LEFT AND RIGHT HEART N/A 11/28/2018    Procedure: CATHETERIZATION, HEART, BOTH LEFT AND RIGHT;  Surgeon: Michelle Holman MD;  Location: [facility] CATH LAB;  Service: Cardiology;  Laterality: N/A;    CATHETERIZATION OF BOTH LEFT AND RIGHT HEART N/A 11/30/2018    Procedure: CATHETERIZATION, HEART, BOTH LEFT AND RIGHT;  Surgeon: Michelle Holman MD;  Location: [facility] CATH LAB;  Service: Cardiology;  Laterality: N/A;    HYSTERECTOMY  1982    INSERTION OF SHUNT      LEFT HEART CATHETERIZATION Left 12/3/2018    Procedure: CATHETERIZATION, HEART, LEFT;  Surgeon: Michelle Holman MD;  Location: [facility]  CATH LAB;  Service: Cardiology;  Laterality: Left;  LAD ATHERECTOMY STENT/ FEMORAL APPROACH    OOPHORECTOMY      wrist cyst Right 1980s    dorsal wrist cyst       Review of patient's allergies indicates:   Allergen Reactions    Codeine Swelling    Darvon [propoxyphene] Swelling       No current facility-administered medications on file prior to encounter.      Current Outpatient Medications on File Prior to Encounter   Medication Sig    ACCU-CHEK ARDEN PLUS METER Mis USE TO TEST TWICE DAILY    amLODIPine (NORVASC) 5 MG tablet Take 5 mg by mouth once daily.    aspirin (ECOTRIN) 81 MG EC tablet Take 1 tablet (81 mg total) by mouth once daily.    atorvastatin (LIPITOR) 80 MG tablet Take 1 tablet (80 mg total) by mouth once daily. (Patient taking differently: Take 40 mg by mouth once daily. )    BIDIL 20-37.5 mg Tab TAKE 2 TABLETS BY MOUTH THREE TIMES DAILY    blood sugar diagnostic Strp 1 strip by Misc.(Non-Drug; Combo Route) route 3 (three) times daily. relion ultima    calcitRIOL (ROCALTROL) 0.25 MCG Cap Take 1 capsule by mouth once daily    clopidogrel (PLAVIX) 75 mg tablet TAKE 1 TABLET BY MOUTH ONCE DAILY    fluticasone propionate (FLONASE) 50 mcg/actuation nasal spray 1 spray (50 mcg total) by Each Nostril route once daily.    furosemide (LASIX) 20 MG tablet     gabapentin (NEURONTIN) 300 MG capsule Take 1 capsule (300 mg total) by mouth every evening.    insulin NPH-insulin regular, 70/30, (NOVOLIN 70/30) 100 unit/mL (70-30) injection INJECT SUBCUTANEOUSLY 30 UNITS IN THE MORNING AND INJECT 25 UNITS IN THE EVENING (Patient taking differently: INJECT SUBCUTANEOUSLY 30 UNITS IN THE MORNING AND INJECT 25 UNITS IN THE EVENING)    lancets (ACCU-CHEK SOFTCLIX LANCETS) Misc 1 lancet by Misc.(Non-Drug; Combo Route) route 3 (three) times daily.    loratadine (CLARITIN) 10 mg tablet Take 1 tablet (10 mg total) by mouth once daily.    metoprolol tartrate (LOPRESSOR) 25 MG tablet TAKE 1 TABLET BY MOUTH  TWICE DAILY    nitroGLYCERIN (NITROSTAT) 0.4 MG SL tablet Place 1 tablet (0.4 mg total) under the tongue every 5 (five) minutes as needed for Chest pain.    ondansetron (ZOFRAN-ODT) 4 MG TbDL DISSOLVE 1 TABLET IN MOUTH THREE TIMES DAILY AS NEEDED FOR NAUSEA FOR 5 DAYS    sodium bicarbonate 325 MG tablet TAKE 1 TABLET BY MOUTH THREE TIMES DAILY    torsemide (DEMADEX) 20 MG Tab Take 2 tablets (40 mg total) by mouth once daily.     Family History     Problem Relation (Age of Onset)    Cancer Maternal Grandmother    Diabetes Mother    Heart disease Mother    Hyperlipidemia Mother    Hypertension Mother    Muscular dystrophy Son        Tobacco Use    Smoking status: Former Smoker     Packs/day: 1.50     Years: 30.00     Pack years: 45.00     Types: Cigarettes     Quit date: 1990     Years since quittin.5    Smokeless tobacco: Former User   Substance and Sexual Activity    Alcohol use: No     Alcohol/week: 0.0 standard drinks    Drug use: No    Sexual activity: Never     Review of Systems   Constitution: Positive for decreased appetite, malaise/fatigue and weight gain.   HENT: Negative.    Eyes: Negative.    Cardiovascular: Positive for dyspnea on exertion and leg swelling. Negative for chest pain.   Respiratory: Positive for cough and shortness of breath.    Endocrine: Negative.    Hematologic/Lymphatic: Negative.    Skin: Negative.    Gastrointestinal: Negative.    Genitourinary: Negative.    Neurological: Negative.    Psychiatric/Behavioral: Negative.    Allergic/Immunologic: Negative.      Objective:     Vital Signs (Most Recent):  Temp: 98.1 °F (36.7 °C) (20 0715)  Pulse: 71 (20 1045)  Resp: 20 (20 1045)  BP: (!) 166/36 (20 1045)  SpO2: 99 % (20 1045) Vital Signs (24h Range):  Temp:  [98.1 °F (36.7 °C)-98.6 °F (37 °C)] 98.1 °F (36.7 °C)  Pulse:  [68-81] 71  Resp:  [14-25] 20  SpO2:  [88 %-100 %] 99 %  BP: (133-188)/() 166/36     Weight: 66.2 kg (146 lb)  Body mass  index is 23.57 kg/m².    SpO2: 99 %  O2 Device (Oxygen Therapy): nasal cannula      Intake/Output Summary (Last 24 hours) at 7/3/2020 1155  Last data filed at 7/3/2020 1000  Gross per 24 hour   Intake 399.13 ml   Output 2475 ml   Net -2075.87 ml       Lines/Drains/Airways     Drain                 Urethral Catheter 07/01/20 1901 Latex 16 Fr. 1 day          Peripheral Intravenous Line                 Peripheral IV - Single Lumen 07/01/20 1614 20 G Right Hand 1 day         Peripheral IV - Single Lumen 07/01/20 1730 20 G Right Forearm 1 day                Physical Exam   Constitutional: She is oriented to person, place, and time. She appears well-developed and well-nourished. No distress.   HENT:   Head: Normocephalic and atraumatic.   Nose: Nose normal.   Mouth/Throat: Oropharynx is clear and moist.   Eyes: Conjunctivae and EOM are normal. No scleral icterus.   Neck: Normal range of motion. Neck supple. No thyromegaly present.   Cardiovascular: Normal rate, regular rhythm, S1 normal and S2 normal. Exam reveals no gallop, no S3, no S4 and no friction rub.   Murmur heard.  Pulmonary/Chest: Effort normal and breath sounds normal. No stridor. No respiratory distress. She has no wheezes. She has no rales. She exhibits no tenderness.   Abdominal: Soft. Bowel sounds are normal. She exhibits no distension and no mass. There is no abdominal tenderness. There is no rebound.   Genitourinary:    Genitourinary Comments: Deferred     Musculoskeletal: Normal range of motion.         General: Edema (trace - resolved) present. No tenderness or deformity.   Lymphadenopathy:     She has no cervical adenopathy.   Neurological: She is alert and oriented to person, place, and time. She exhibits normal muscle tone. Coordination normal.   Skin: Skin is warm and dry. No rash noted. She is not diaphoretic. No erythema. No pallor.   Psychiatric: She has a normal mood and affect. Her behavior is normal. Judgment and thought content normal.    Nursing note and vitals reviewed.      Significant Labs:   All pertinent lab results from the last 24 hours have been reviewed. and   Recent Lab Results       07/03/20  1120   07/03/20  0801   07/03/20  0552   07/03/20  0334   07/03/20  0031        Albumin       3.1              3.1       Alkaline Phosphatase       66       ALT       61       Anion Gap       14              14       AST       21       Baso #       0.01       Basophil%       0.1       BILIRUBIN TOTAL       0.3  Comment:  For infants and newborns, interpretation of results should be based  on gestational age, weight and in agreement with clinical  observations.  Premature Infant recommended reference ranges:  Up to 24 hours.............<8.0 mg/dL  Up to 48 hours............<12.0 mg/dL  3-5 days..................<15.0 mg/dL  6-29 days.................<15.0 mg/dL         BNP       747  Comment:  Values of less than 100 pg/ml are consistent with non-CHF populations.       BUN, Bld       88              88       Calcium       9.1              9.1       Chloride       102              102       CO2       24              24       Creatinine       4.8              4.8       Differential Method       Automated       eGFR if        10              10       eGFR if non        8  Comment:  Calculation used to obtain the estimated glomerular filtration  rate (eGFR) is the CKD-EPI equation.                 8  Comment:  Calculation used to obtain the estimated glomerular filtration  rate (eGFR) is the CKD-EPI equation.          Eos #       0.0       Eosinophil%       0.4       Glucose       160              160       Gran # (ANC)       6.6       Gran%       82.9       Hematocrit       29.2       Hemoglobin       9.3       Immature Grans (Abs)       0.04  Comment:  Mild elevation in immature granulocytes is non specific and   can be seen in a variety of conditions including stress response,   acute inflammation, trauma and  pregnancy. Correlation with other   laboratory and clinical findings is essential.         Immature Granulocytes       0.5       Lymph #       0.8       Lymph%       9.6       MCH       30.4       MCHC       31.8       MCV       95       Mono #       0.5       Mono%       6.5       MPV       10.6       nRBC       0       Phosphorus       3.8       Platelets       185       POCT Glucose 199 106 142   285     Potassium       3.8              3.8       PROTEIN TOTAL       6.3       RBC       3.06       RDW       12.7       Sodium       140              140       Troponin I               WBC       8.01                        07/02/20  1727   07/02/20  1710   07/02/20  1242        Albumin           Alkaline Phosphatase           ALT           Anion Gap           AST           Baso #           Basophil%           BILIRUBIN TOTAL           BNP           BUN, Bld           Calcium           Chloride           CO2           Creatinine           Differential Method           eGFR if            eGFR if non            Eos #           Eosinophil%           Glucose           Gran # (ANC)           Gran%           Hematocrit           Hemoglobin           Immature Grans (Abs)           Immature Granulocytes           Lymph #           Lymph%           MCH           MCHC           MCV           Mono #           Mono%           MPV           nRBC           Phosphorus           Platelets           POCT Glucose 305         Potassium           PROTEIN TOTAL           RBC           RDW           Sodium           Troponin I   0.090  Comment:  The reference interval for Troponin I represents the 99th percentile   cutoff   for our facility and is consistent with 3rd generation assay   performance.   0.104  Comment:  The reference interval for Troponin I represents the 99th percentile   cutoff   for our facility and is consistent with 3rd generation assay   performance.       WBC                 Significant  Imaging: Echocardiogram:   2D echo with color flow doppler:   Results for orders placed or performed during the hospital encounter of 11/26/18   2D echo with color flow doppler   Result Value Ref Range    QEF 45 55 - 65    Mitral Valve Regurgitation MILD TO MODERATE     Diastolic Dysfunction Yes (A)     Est. PA Systolic Pressure 69.15 (A)     Tricuspid Valve Regurgitation MILD     Narrative    Date of Procedure: 11/27/2018        TEST DESCRIPTION   Technical Quality: This is a portable study performed at the patient's bedside. This is a technically challenging study. There is poor endocardial definition.     Aorta: The aortic root is normal in size, measuring 2.6 cm at sinotubular junction and 2.6 cm at Sinuses of Valsalva. The proximal ascending aorta is normal in size, measuring 2.6 cm across.     Left Atrium: The left atrial volume index is normal, measuring 31.92 cc/m2.     Left Ventricle: The left ventricle is normal in size, with an end-diastolic diameter of 4.6 cm, and an end-systolic diameter of 3.2 cm. LV wall thickness is normal, with the septum measuring 1.0 cm and the posterior wall measuring 0.9 cm across. Relative   wall thickness was normal at 0.39, and the LV mass index was 92.3 g/m2 consistent with normal left ventricular mass. The following segments were akinetic: apical septum, apical inferior wall.  The following segments were moderately hypokinetic: mid inferoseptum.  The following segments were severely hypokinetic: mid anteroseptum.  Left ventricular systolic function appears mildly depressed. Visually estimated ejection fraction is 45-50%. The LV Doppler derived stroke volume equals 99.0 ccs.     Diastolic indices: E wave velocity 1.2 m/s, E/A ratio 1.3,  msec., E/e' ratio(avg) 13. There is diastolic dysfunction secondary to relaxation abnormality.     Right Atrium: The right atrium is normal in size, measuring 4.0 cm in length and 3.0 cm in width in the apical view.     Right  Ventricle: The right ventricle is normal in size. Global right ventricular systolic function appears normal. Tricuspid annular plane systolic excursion (TAPSE) is 2.0 cm. The estimated PA systolic pressure is 69 mmHg.     Aortic Valve:  The peak gradient obtained across the aortic valve is 13 mmHg, with a mean gradient of 7 mmHg. Using a left ventricular outflow tract diameter of 2.0 cm, a left ventricular outflow tract velocity time integral of 33 cm, and a peak   instantaneous transvalvular velocity time integral of 34 cm, the calculated aortic valve area is 2.91 cm2(AVAi is 1.57 cm2/m2).     Mitral Valve:  The pressure half time is 42 msec. The calculated mitral valve area is 5.24 cm2. There is mild to moderate mitral regurgitation.     Tricuspid Valve:  There is mild tricuspid regurgitation.     IVC: IVC is enlarged but collapses > 50% with a sniff, suggesting intermediate right atrial pressure of 8 mmHg.     Atrial Septum: The atrial septum is intact.     Intracavitary: There is no evidence of pericardial effusion, intracavity mass, thrombi, or vegetation.     Other: There is a right pleural effusion present.         CONCLUSIONS     1 - Wall motion abnormalities.     2 - Mildly depressed left ventricular systolic function (EF 45-50%).     3 - Impaired LV relaxation, normal LAP (grade 1 diastolic dysfunction).     4 - Normal right ventricular systolic function .     5 - Pulmonary hypertension. The estimated PA systolic pressure is 69 mmHg.     6 - Mild to moderate mitral regurgitation.     7 - Mild tricuspid regurgitation.     8 - Intermediate central venous pressure.     9 - Right pleural effusion.             This document has been electronically    SIGNED BY: Michelle Holman MD On: 11/27/2018 10:58    This document was originally electronically signed on: 11/27/2018 10:57    and X-Ray: CXR: X-Ray Chest 1 View (CXR):   Results for orders placed or performed during the hospital encounter of 07/01/20   X-Ray Chest  1 View    Narrative    EXAMINATION:  XR CHEST 1 VIEW    CLINICAL HISTORY:  sob;    COMPARISON:  July 1, 2020    FINDINGS:  Worsening small effusions.  Interval development of a ground-glass opacity in the right upper lung.  Similar degree of vascular congestion with less prominent Kerley B-lines currently.  The hilar and mediastinal contours and osseous structures are otherwise unchanged.  Stable cardiac silhouette size enlargement, tortuosity of the aorta and calcifications of the aortic knob.      Impression    1.  Interval development of ground-glass opacity within the right upper lung.  Worsening left effusion.  Stable small right effusion.  Similar degree of vascular congestion with improved Kerley B lines.  Stable cardiac silhouette size enlargement.  Again, interstitial pulmonary edema versus interstitial infectious process must be considered.      Electronically signed by: Trev Dean MD  Date:    07/03/2020  Time:    08:01     Assessment and Plan:     * Acute on chronic combined systolic and diastolic congestive heart failure  S/p IV diuresis  OK to DC with PO lasix and close follow up this week    Coronary artery disease involving native coronary artery of native heart  Cont home meds  F/u as outpt  No chest pain    Stage 5 chronic kidney disease  WIll f/u with nephro regarding HD start    Pulmonary hypertension  Will have further workup as outpt  Likely due to LH disease with volume overload  May need RHC will discuss on follow up    Anemia in stage 4 chronic kidney disease  Cont tx per primary team    Hypertension associated with diabetes  BP meds titrated well controlled now  Cont to monitor        VTE Risk Mitigation (From admission, onward)         Ordered     enoxaparin injection 30 mg  Every 24 hours      07/01/20 1848     IP VTE HIGH RISK PATIENT  Once      07/01/20 1848     Place sequential compression device  Until discontinued      07/01/20 1848                Critical Care Time: 50  minutes  Critical secondary to Patient has a condition that poses threat to life and bodily function:     CHF exac, pulm edema, HTN urgency, CAD, CKD, Pulm HTN     Critical care was time spent personally by me on the following activities: development of treatment plan with patient or surrogate and bedside caregivers, discussions with consultants, evaluation of patient's response to treatment, examination of patient, ordering and performing treatments and interventions, ordering and review of laboratory studies, ordering and review of cardiac and radiographic studies, telemetry and re-evaluation of patient's condition. This critical care time did not overlap with that of any other provider or involve time for any procedures.      Thank you for your consult. I will follow-up with patient. Please contact us if you have any additional questions.    Demetrio Mejia Md, MD  Cardiology   [facility] [place]

## 2020-07-03 NOTE — HPI
Avril Monzon is a 75 y.o. female pt admitted to ICU on NIV with a dx of acute on chronic systolic and diastolic CHF exacerbation , Volume overload , Uncontrolled HTN , Acute hypoxic respiratory failure  And CKD stage 5 . She was started on NIV , NTG drip and lasix drip with and improvement of her sx . She is (-) 2.3 lt since admission . She is wean off  BIPAP .   She cont on NTG drip and lasix drip . The BP has improved  . Her home BP medication were resume .   She has improved with IV diuresis, cardiology consulted for CHF. Discussed will need volume removal and will work up pulm HTN as outpt. Pt feels well now likely will be DC home.     Results for orders placed during the hospital encounter of 07/01/20   Echo Color Flow Doppler? Yes    Narrative · Concentric left ventricular hypertrophy.  · Normal left ventricular systolic function. The estimated ejection   fraction is 60%.  · Grade II (moderate) left ventricular diastolic dysfunction consistent   with pseudonormalization.  · Normal right ventricular systolic function.  · Mild mitral regurgitation.  · Mild to moderate tricuspid regurgitation.  · Mild pulmonic regurgitation.  · Moderate mitral prolapse of the anterior mitral leaflet.  · Normal central venous pressure (3 mmHg).  · The estimated PA systolic pressure is 68 mmHg.  · Pulmonary hypertension present.

## 2020-07-03 NOTE — SUBJECTIVE & OBJECTIVE
Interval History:     Review of Systems   Constitutional: Positive for activity change. Negative for diaphoresis, fatigue and fever.   HENT: Negative for congestion, dental problem, drooling, ear discharge, ear pain, facial swelling, hearing loss, mouth sores, nosebleeds and postnasal drip.    Eyes: Negative for photophobia, pain, discharge, redness and itching.   Respiratory: Positive for shortness of breath. Negative for cough and chest tightness.    Cardiovascular: Positive for leg swelling. Negative for chest pain and palpitations.   Gastrointestinal: Negative for abdominal distention, abdominal pain, anal bleeding, blood in stool, constipation, diarrhea, nausea and rectal pain.   Endocrine: Negative for cold intolerance, heat intolerance, polydipsia and polyphagia.   Genitourinary: Negative for decreased urine volume, difficulty urinating, dyspareunia, dysuria, enuresis, flank pain, frequency, genital sores, hematuria, menstrual problem, pelvic pain and urgency.   Musculoskeletal: Negative for arthralgias, back pain, gait problem, joint swelling, myalgias and neck pain.   Skin: Negative for color change, pallor and rash.   Allergic/Immunologic: Negative for environmental allergies and food allergies.   Neurological: Negative for dizziness, seizures, syncope, facial asymmetry, speech difficulty, light-headedness, numbness and headaches.   Psychiatric/Behavioral: Negative for agitation, behavioral problems, confusion, decreased concentration, dysphoric mood, hallucinations, self-injury and sleep disturbance. The patient is not nervous/anxious and is not hyperactive.      Objective:     Vital Signs (Most Recent):  Temp: 98.1 °F (36.7 °C) (07/03/20 0715)  Pulse: 71 (07/03/20 1045)  Resp: 20 (07/03/20 1045)  BP: (!) 166/36 (07/03/20 1045)  SpO2: 99 % (07/03/20 1045) Vital Signs (24h Range):  Temp:  [98.1 °F (36.7 °C)-98.6 °F (37 °C)] 98.1 °F (36.7 °C)  Pulse:  [68-81] 71  Resp:  [14-25] 20  SpO2:  [88 %-100 %] 99  %  BP: (133-188)/() 166/36     Weight: 66.2 kg (146 lb)  Body mass index is 23.57 kg/m².    Intake/Output Summary (Last 24 hours) at 7/3/2020 1116  Last data filed at 7/3/2020 1000  Gross per 24 hour   Intake 399.13 ml   Output 2475 ml   Net -2075.87 ml      Physical Exam  Constitutional:       General: She is not in acute distress.     Appearance: She is ill-appearing.   HENT:      Head: Normocephalic and atraumatic.      Nose: Nose normal.      Mouth/Throat:      Mouth: Mucous membranes are moist.   Eyes:      Extraocular Movements: Extraocular movements intact.      Conjunctiva/sclera: Conjunctivae normal.      Pupils: Pupils are equal, round, and reactive to light.   Neck:      Musculoskeletal: Normal range of motion and neck supple. No neck rigidity or muscular tenderness.      Vascular: No carotid bruit.   Cardiovascular:      Rate and Rhythm: Normal rate and regular rhythm.      Pulses: Normal pulses.      Heart sounds: Normal heart sounds. No murmur. No friction rub.   Pulmonary:      Effort: No respiratory distress.      Breath sounds: Rales present.   Abdominal:      General: Abdomen is flat. Bowel sounds are normal. There is no distension.      Palpations: Abdomen is soft. There is no mass.      Tenderness: There is no abdominal tenderness. There is no guarding or rebound.      Hernia: No hernia is present.   Musculoskeletal: Normal range of motion.         General: Swelling present. No tenderness or deformity.   Skin:     General: Skin is warm.      Coloration: Skin is not jaundiced or pale.   Neurological:      General: No focal deficit present.      Mental Status: She is alert and oriented to person, place, and time. Mental status is at baseline.      Cranial Nerves: No cranial nerve deficit.      Sensory: No sensory deficit.   Psychiatric:         Mood and Affect: Mood normal.         Significant Labs: All pertinent labs within the past 24 hours have been reviewed.    Significant Imaging: I have  reviewed all pertinent imaging results/findings within the past 24 hours.

## 2020-07-03 NOTE — ASSESSMENT & PLAN NOTE
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

## 2020-07-04 VITALS
TEMPERATURE: 99 F | RESPIRATION RATE: 18 BRPM | SYSTOLIC BLOOD PRESSURE: 125 MMHG | WEIGHT: 146 LBS | HEIGHT: 66 IN | HEART RATE: 74 BPM | BODY MASS INDEX: 23.46 KG/M2 | DIASTOLIC BLOOD PRESSURE: 60 MMHG | OXYGEN SATURATION: 97 %

## 2020-07-04 PROBLEM — I50.43 ACUTE ON CHRONIC COMBINED SYSTOLIC AND DIASTOLIC CONGESTIVE HEART FAILURE: Status: RESOLVED | Noted: 2018-12-12 | Resolved: 2020-07-04

## 2020-07-04 PROBLEM — J81.0 ACUTE PULMONARY EDEMA: Status: RESOLVED | Noted: 2020-07-01 | Resolved: 2020-07-04

## 2020-07-04 LAB
ALBUMIN SERPL BCP-MCNC: 2.7 G/DL (ref 3.5–5.2)
ALBUMIN SERPL BCP-MCNC: 2.7 G/DL (ref 3.5–5.2)
ALP SERPL-CCNC: 58 U/L (ref 55–135)
ALT SERPL W/O P-5'-P-CCNC: 39 U/L (ref 10–44)
ANION GAP SERPL CALC-SCNC: 12 MMOL/L (ref 8–16)
ANION GAP SERPL CALC-SCNC: 12 MMOL/L (ref 8–16)
AST SERPL-CCNC: 16 U/L (ref 10–40)
BASOPHILS # BLD AUTO: 0.01 K/UL (ref 0–0.2)
BASOPHILS NFR BLD: 0.2 % (ref 0–1.9)
BILIRUB SERPL-MCNC: 0.2 MG/DL (ref 0.1–1)
BUN SERPL-MCNC: 81 MG/DL (ref 8–23)
BUN SERPL-MCNC: 81 MG/DL (ref 8–23)
CALCIUM SERPL-MCNC: 8.6 MG/DL (ref 8.7–10.5)
CALCIUM SERPL-MCNC: 8.6 MG/DL (ref 8.7–10.5)
CHLORIDE SERPL-SCNC: 105 MMOL/L (ref 95–110)
CHLORIDE SERPL-SCNC: 105 MMOL/L (ref 95–110)
CO2 SERPL-SCNC: 26 MMOL/L (ref 23–29)
CO2 SERPL-SCNC: 26 MMOL/L (ref 23–29)
CREAT SERPL-MCNC: 5.1 MG/DL (ref 0.5–1.4)
CREAT SERPL-MCNC: 5.1 MG/DL (ref 0.5–1.4)
DIFFERENTIAL METHOD: ABNORMAL
EOSINOPHIL # BLD AUTO: 0.1 K/UL (ref 0–0.5)
EOSINOPHIL NFR BLD: 1.3 % (ref 0–8)
ERYTHROCYTE [DISTWIDTH] IN BLOOD BY AUTOMATED COUNT: 12.6 % (ref 11.5–14.5)
EST. GFR  (AFRICAN AMERICAN): 9 ML/MIN/1.73 M^2
EST. GFR  (AFRICAN AMERICAN): 9 ML/MIN/1.73 M^2
EST. GFR  (NON AFRICAN AMERICAN): 8 ML/MIN/1.73 M^2
EST. GFR  (NON AFRICAN AMERICAN): 8 ML/MIN/1.73 M^2
GLUCOSE SERPL-MCNC: 153 MG/DL (ref 70–110)
GLUCOSE SERPL-MCNC: 153 MG/DL (ref 70–110)
HCT VFR BLD AUTO: 28.3 % (ref 37–48.5)
HGB BLD-MCNC: 8.9 G/DL (ref 12–16)
IMM GRANULOCYTES # BLD AUTO: 0.02 K/UL (ref 0–0.04)
IMM GRANULOCYTES NFR BLD AUTO: 0.4 % (ref 0–0.5)
LYMPHOCYTES # BLD AUTO: 0.7 K/UL (ref 1–4.8)
LYMPHOCYTES NFR BLD: 12.8 % (ref 18–48)
MCH RBC QN AUTO: 30.1 PG (ref 27–31)
MCHC RBC AUTO-ENTMCNC: 31.4 G/DL (ref 32–36)
MCV RBC AUTO: 96 FL (ref 82–98)
MONOCYTES # BLD AUTO: 0.4 K/UL (ref 0.3–1)
MONOCYTES NFR BLD: 6.8 % (ref 4–15)
NEUTROPHILS # BLD AUTO: 4.2 K/UL (ref 1.8–7.7)
NEUTROPHILS NFR BLD: 78.5 % (ref 38–73)
NRBC BLD-RTO: 0 /100 WBC
PHOSPHATE SERPL-MCNC: 4.3 MG/DL (ref 2.7–4.5)
PLATELET # BLD AUTO: 185 K/UL (ref 150–350)
PMV BLD AUTO: 10.5 FL (ref 9.2–12.9)
POCT GLUCOSE: 118 MG/DL (ref 70–110)
POCT GLUCOSE: 180 MG/DL (ref 70–110)
POTASSIUM SERPL-SCNC: 3.7 MMOL/L (ref 3.5–5.1)
POTASSIUM SERPL-SCNC: 3.7 MMOL/L (ref 3.5–5.1)
PROT SERPL-MCNC: 5.9 G/DL (ref 6–8.4)
RBC # BLD AUTO: 2.96 M/UL (ref 4–5.4)
SODIUM SERPL-SCNC: 143 MMOL/L (ref 136–145)
SODIUM SERPL-SCNC: 143 MMOL/L (ref 136–145)
WBC # BLD AUTO: 5.3 K/UL (ref 3.9–12.7)

## 2020-07-04 PROCEDURE — 36415 COLL VENOUS BLD VENIPUNCTURE: CPT | Mod: HCNC,NTX

## 2020-07-04 PROCEDURE — 99233 PR SUBSEQUENT HOSPITAL CARE,LEVL III: ICD-10-PCS | Mod: HCNC,NTX,, | Performed by: INTERNAL MEDICINE

## 2020-07-04 PROCEDURE — 99900035 HC TECH TIME PER 15 MIN (STAT): Mod: HCNC,NTX

## 2020-07-04 PROCEDURE — 80053 COMPREHEN METABOLIC PANEL: CPT | Mod: HCNC,NTX

## 2020-07-04 PROCEDURE — 85025 COMPLETE CBC W/AUTO DIFF WBC: CPT | Mod: HCNC,NTX

## 2020-07-04 PROCEDURE — 25000003 PHARM REV CODE 250: Mod: HCNC,NTX | Performed by: INTERNAL MEDICINE

## 2020-07-04 PROCEDURE — 84100 ASSAY OF PHOSPHORUS: CPT | Mod: HCNC,NTX

## 2020-07-04 PROCEDURE — 63600175 PHARM REV CODE 636 W HCPCS: Mod: HCNC,NTX | Performed by: INTERNAL MEDICINE

## 2020-07-04 PROCEDURE — 94761 N-INVAS EAR/PLS OXIMETRY MLT: CPT | Mod: HCNC,NTX

## 2020-07-04 PROCEDURE — 99233 SBSQ HOSP IP/OBS HIGH 50: CPT | Mod: HCNC,NTX,, | Performed by: INTERNAL MEDICINE

## 2020-07-04 PROCEDURE — 27000221 HC OXYGEN, UP TO 24 HOURS: Mod: HCNC,NTX

## 2020-07-04 PROCEDURE — 25000003 PHARM REV CODE 250: Mod: HCNC,NTX | Performed by: FAMILY MEDICINE

## 2020-07-04 PROCEDURE — 25000003 PHARM REV CODE 250: Mod: HCNC,NTX | Performed by: NURSE PRACTITIONER

## 2020-07-04 RX ORDER — FUROSEMIDE 80 MG/1
80 TABLET ORAL 2 TIMES DAILY
Status: DISCONTINUED | OUTPATIENT
Start: 2020-07-04 | End: 2020-07-04 | Stop reason: HOSPADM

## 2020-07-04 RX ORDER — FUROSEMIDE 80 MG/1
80 TABLET ORAL ONCE
Status: COMPLETED | OUTPATIENT
Start: 2020-07-04 | End: 2020-07-04

## 2020-07-04 RX ORDER — CETIRIZINE HYDROCHLORIDE 5 MG/1
5 TABLET ORAL DAILY
Status: DISCONTINUED | OUTPATIENT
Start: 2020-07-04 | End: 2020-07-04 | Stop reason: HOSPADM

## 2020-07-04 RX ORDER — FUROSEMIDE 80 MG/1
80 TABLET ORAL 2 TIMES DAILY
Qty: 60 TABLET | Refills: 11 | Status: SHIPPED | OUTPATIENT
Start: 2020-07-04 | End: 2020-11-17

## 2020-07-04 RX ADMIN — AMLODIPINE BESYLATE 10 MG: 10 TABLET ORAL at 08:07

## 2020-07-04 RX ADMIN — CLOPIDOGREL BISULFATE 75 MG: 75 TABLET ORAL at 08:07

## 2020-07-04 RX ADMIN — METOPROLOL TARTRATE 25 MG: 25 TABLET, FILM COATED ORAL at 08:07

## 2020-07-04 RX ADMIN — FUROSEMIDE 80 MG: 80 TABLET ORAL at 10:07

## 2020-07-04 RX ADMIN — HYDRALAZINE HYDROCHLORIDE AND ISOSORBIDE DINITRATE 2 TABLET: 37.5; 2 TABLET, FILM COATED ORAL at 08:07

## 2020-07-04 RX ADMIN — ASPIRIN 81 MG: 81 TABLET, COATED ORAL at 08:07

## 2020-07-04 RX ADMIN — FUROSEMIDE 80 MG: 80 TABLET ORAL at 05:07

## 2020-07-04 RX ADMIN — ATORVASTATIN CALCIUM 40 MG: 40 TABLET, FILM COATED ORAL at 08:07

## 2020-07-04 RX ADMIN — HYDRALAZINE HYDROCHLORIDE AND ISOSORBIDE DINITRATE 2 TABLET: 37.5; 2 TABLET, FILM COATED ORAL at 02:07

## 2020-07-04 RX ADMIN — CETIRIZINE HYDROCHLORIDE 5 MG: 5 TABLET ORAL at 01:07

## 2020-07-04 RX ADMIN — ENOXAPARIN SODIUM 30 MG: 100 INJECTION SUBCUTANEOUS at 04:07

## 2020-07-04 NOTE — PROGRESS NOTES
Home Oxygen Evaluation    Date Performed: 2020    1) Patient's Home O2 Sat on room air, while at rest: 92        If O2 sats on room air at rest are 88% or below, patient qualifies. No additional testing needed. Document N/A in steps 2 and 3. If 89% or above, complete steps 2.      2) Patient's O2 Sat on room air while exercisin        If O2 sats on room air while exercising remain 89% or above patient does not qualify, no further testing needed Document N/A in step 3. If O2 sats on room air while exercising are 88% or below, continue to step 3.      3) Patient's O2 Sat while exercising on O2: 94 at 2 LPM         (Must show improvement from #2 for patients to qualify)    If O2 sats improve on oxygen, patient qualifies for portable oxygen. If not, the patient does not qualify.

## 2020-07-04 NOTE — PLAN OF CARE
Went over discharge instructions with patient at bedside.  Stressed the importance of making and keeping all appointments.  Pt will  prescriptions at pharmacy.  Pt verbalized understanding.   Had all questions regarding discharge answered to satisfaction.  IV removed without complications.  Tele box removed and returned to monitor tech.  D/c with 2L NC home O2 and humidifier.   Waiting for personal transportation to arrive.

## 2020-07-04 NOTE — PLAN OF CARE
Dx: CHF     POC:IV diuresis administered per orders, strict I&O. No shortness of breath, O2 at 4L per NC.   Bed remains low and locked, side rails up x 2, call bell and belongings within reach.   Clutter removed from room, lighting adjusted when rounding.   Frequent repositioning encouraged to prevent pressure injury.   Blood glucose checked before meals and at bedtime, insulin administered as ordered when trays on unit.   Pain frequently monitored, interventions implemented as ordered and appropriate.   Cardiac monitor in place, normal sinus rhythm.   Will continue to monitor.

## 2020-07-04 NOTE — PLAN OF CARE
Pt AAO x4.  Pt remains free of falls throughout shift.  Pt denies pain throughout shift.  Plan of care reviewed. Pt verbalizes understanding.  Pt moving and turning independently. Frequent weight shifting encouraged.  Normal sinus rhythm on monitor.  Hourly rounding completed.  O2 titrated to 2L.  Will continue to monitor.

## 2020-07-04 NOTE — PROGRESS NOTES
[facility].  Spoke with Lasha who approved oxygen for home use.  Concentrator will be delivered and set up in pt home later today.      7059 - portable oxygen delivered to pt's room.  Paperwork signed and filed in [employer]

## 2020-07-04 NOTE — SUBJECTIVE & OBJECTIVE
Interval History:     7/3/20: Creatinine stable at 4.8. UOP was 2.3 liters/day.     7/4/20: Creatinine at 5.1 today. Patient is resting comfortably, no complaints at present.               Review of patient's allergies indicates:   Allergen Reactions    Codeine Swelling    Darvon [propoxyphene] Swelling     Current Facility-Administered Medications   Medication Frequency    amLODIPine tablet 10 mg Daily    aspirin EC tablet 81 mg Daily    atorvastatin tablet 40 mg Daily    clopidogreL tablet 75 mg Daily    dextrose 50% injection 12.5 g PRN    dextrose 50% injection 25 g PRN    enoxaparin injection 30 mg Q24H    furosemide tablet 80 mg BID    glucagon (human recombinant) injection 1 mg PRN    glucose chewable tablet 16 g PRN    glucose chewable tablet 24 g PRN    hydrALAZINE injection 10 mg Q8H PRN    insulin aspart U-100 pen 0-5 Units QID (AC + HS) PRN    insulin detemir U-100 pen 15 Units QHS    isosorbide-hydrALAZINE 20-37.5 mg per tablet 2 tablet TID    metoprolol tartrate (LOPRESSOR) tablet 25 mg BID    ondansetron injection 4 mg Q8H PRN    sodium chloride 0.9% flush 10 mL PRN       Objective:     Vital Signs (Most Recent):  Temp: 98.2 °F (36.8 °C) (07/04/20 0803)  Pulse: 64 (07/04/20 0900)  Resp: 16 (07/04/20 0803)  BP: (!) 171/76 (07/04/20 0812)  SpO2: 100 % (07/04/20 0803)  O2 Device (Oxygen Therapy): nasal cannula (07/04/20 0803) Vital Signs (24h Range):  Temp:  [98.2 °F (36.8 °C)-98.9 °F (37.2 °C)] 98.2 °F (36.8 °C)  Pulse:  [64-73] 64  Resp:  [14-22] 16  SpO2:  [96 %-100 %] 100 %  BP: (115-187)/(36-78) 171/76     Weight: 66.2 kg (146 lb) (07/02/20 0841)  Body mass index is 23.57 kg/m².  Body surface area is 1.76 meters squared.    I/O last 3 completed shifts:  In: 229.9 [I.V.:179.9; IV Piggyback:50]  Out: 2021 [Urine:2021]    Physical Exam  Constitutional:       Appearance: She is well-developed.   HENT:      Head: Normocephalic.   Eyes:      Pupils: Pupils are equal, round, and  reactive to light.   Neck:      Thyroid: No thyromegaly.   Cardiovascular:      Rate and Rhythm: Normal rate and regular rhythm.      Heart sounds: No friction rub.   Pulmonary:      Effort: Pulmonary effort is normal.      Breath sounds: Normal breath sounds. No wheezing.   Chest:      Chest wall: No tenderness.   Abdominal:      General: Bowel sounds are normal. There is no distension.      Palpations: Abdomen is soft.      Tenderness: There is no abdominal tenderness.   Lymphadenopathy:      Cervical: No cervical adenopathy.   Skin:     General: Skin is warm and dry.      Findings: No rash.   Neurological:      Mental Status: She is alert and oriented to person, place, and time.         Significant Labs:  Lab Results   Component Value Date    CREATININE 5.1 (H) 07/04/2020    CREATININE 5.1 (H) 07/04/2020    BUN 81 (H) 07/04/2020    BUN 81 (H) 07/04/2020     07/04/2020     07/04/2020    K 3.7 07/04/2020    K 3.7 07/04/2020     07/04/2020     07/04/2020    CO2 26 07/04/2020    CO2 26 07/04/2020     Lab Results   Component Value Date    .0 (H) 04/08/2020    CALCIUM 8.6 (L) 07/04/2020    CALCIUM 8.6 (L) 07/04/2020    PHOS 4.3 07/04/2020     Lab Results   Component Value Date    ALBUMIN 2.7 (L) 07/04/2020    ALBUMIN 2.7 (L) 07/04/2020     Lab Results   Component Value Date    WBC 5.30 07/04/2020    HGB 8.9 (L) 07/04/2020    HCT 28.3 (L) 07/04/2020    MCV 96 07/04/2020     07/04/2020       Recent Labs   Lab 07/02/20  0527   MG 2.0         Significant Imaging:  Imaging Results          X-Ray Chest AP Portable (Final result)  Result time 07/01/20 14:06:26    Final result by Trev Dean MD (07/01/20 14:06:26)                 Impression:      1.  Moderate vascular congestion with small effusions, Kerley B-lines and cardiac silhouette size enlargement.  Findings most likely represent interstitial pulmonary edema.  Less likely could an interstitial infectious process cause this  appearance.    2.  Stable findings as noted above.      Electronically signed by: Trev Dean MD  Date:    07/01/2020  Time:    14:06             Narrative:    EXAMINATION:  XR CHEST AP PORTABLE    CLINICAL HISTORY:  Heart failure, unspecified    COMPARISON:  March 26, 2019    FINDINGS:  Small effusions.  Vascular congestion with Kerley B lines.  Moderate basilar interstitial changes.  The upper lungs are clear. The cardiac silhouette size is enlarged.  The trachea is midline and the mediastinal width is normal. Negative for pneumothorax.  Negative for osseous abnormalities. Tortuous aorta with calcifications of the aortic knob.  Degenerative changes of the spine and both shoulder girdles.

## 2020-07-04 NOTE — PROGRESS NOTES
[facility] -   Nephrology  Progress Note    Patient Name: Avril Monzon  MRN: [id number]  Admission Date: 7/1/2020  Hospital Length of Stay: 3 days  Attending Provider: Ren Yan MD   Primary Care Physician: Rose Parks MD  Principal Problem:Acute on chronic combined systolic and diastolic congestive heart failure    Subjective:     HPI: No notes on file    Interval History:     7/3/20: Creatinine stable at 4.8. UOP was 2.3 liters/day.     7/4/20: Creatinine at 5.1 today. Patient is resting comfortably, no complaints at present.               Review of patient's allergies indicates:   Allergen Reactions    Codeine Swelling    Darvon [propoxyphene] Swelling     Current Facility-Administered Medications   Medication Frequency    amLODIPine tablet 10 mg Daily    aspirin EC tablet 81 mg Daily    atorvastatin tablet 40 mg Daily    clopidogreL tablet 75 mg Daily    dextrose 50% injection 12.5 g PRN    dextrose 50% injection 25 g PRN    enoxaparin injection 30 mg Q24H    furosemide tablet 80 mg BID    glucagon (human recombinant) injection 1 mg PRN    glucose chewable tablet 16 g PRN    glucose chewable tablet 24 g PRN    hydrALAZINE injection 10 mg Q8H PRN    insulin aspart U-100 pen 0-5 Units QID (AC + HS) PRN    insulin detemir U-100 pen 15 Units QHS    isosorbide-hydrALAZINE 20-37.5 mg per tablet 2 tablet TID    metoprolol tartrate (LOPRESSOR) tablet 25 mg BID    ondansetron injection 4 mg Q8H PRN    sodium chloride 0.9% flush 10 mL PRN       Objective:     Vital Signs (Most Recent):  Temp: 98.2 °F (36.8 °C) (07/04/20 0803)  Pulse: 64 (07/04/20 0900)  Resp: 16 (07/04/20 0803)  BP: (!) 171/76 (07/04/20 0812)  SpO2: 100 % (07/04/20 0803)  O2 Device (Oxygen Therapy): nasal cannula (07/04/20 0803) Vital Signs (24h Range):  Temp:  [98.2 °F (36.8 °C)-98.9 °F (37.2 °C)] 98.2 °F (36.8 °C)  Pulse:  [64-73] 64  Resp:  [14-22] 16  SpO2:  [96 %-100 %] 100 %  BP: (115-187)/(36-78) 171/76      Weight: 66.2 kg (146 lb) (07/02/20 0841)  Body mass index is 23.57 kg/m².  Body surface area is 1.76 meters squared.    I/O last 3 completed shifts:  In: 229.9 [I.V.:179.9; IV Piggyback:50]  Out: 2021 [Urine:2021]    Physical Exam  Constitutional:       Appearance: She is well-developed.   HENT:      Head: Normocephalic.   Eyes:      Pupils: Pupils are equal, round, and reactive to light.   Neck:      Thyroid: No thyromegaly.   Cardiovascular:      Rate and Rhythm: Normal rate and regular rhythm.      Heart sounds: No friction rub.   Pulmonary:      Effort: Pulmonary effort is normal.      Breath sounds: Normal breath sounds. No wheezing.   Chest:      Chest wall: No tenderness.   Abdominal:      General: Bowel sounds are normal. There is no distension.      Palpations: Abdomen is soft.      Tenderness: There is no abdominal tenderness.   Lymphadenopathy:      Cervical: No cervical adenopathy.   Skin:     General: Skin is warm and dry.      Findings: No rash.   Neurological:      Mental Status: She is alert and oriented to person, place, and time.         Significant Labs:  Lab Results   Component Value Date    CREATININE 5.1 (H) 07/04/2020    CREATININE 5.1 (H) 07/04/2020    BUN 81 (H) 07/04/2020    BUN 81 (H) 07/04/2020     07/04/2020     07/04/2020    K 3.7 07/04/2020    K 3.7 07/04/2020     07/04/2020     07/04/2020    CO2 26 07/04/2020    CO2 26 07/04/2020     Lab Results   Component Value Date    .0 (H) 04/08/2020    CALCIUM 8.6 (L) 07/04/2020    CALCIUM 8.6 (L) 07/04/2020    PHOS 4.3 07/04/2020     Lab Results   Component Value Date    ALBUMIN 2.7 (L) 07/04/2020    ALBUMIN 2.7 (L) 07/04/2020     Lab Results   Component Value Date    WBC 5.30 07/04/2020    HGB 8.9 (L) 07/04/2020    HCT 28.3 (L) 07/04/2020    MCV 96 07/04/2020     07/04/2020       Recent Labs   Lab 07/02/20  0527   MG 2.0         Significant Imaging:  Imaging Results          X-Ray Chest AP Portable  (Final result)  Result time 07/01/20 14:06:26    Final result by Trev Dean MD (07/01/20 14:06:26)                 Impression:      1.  Moderate vascular congestion with small effusions, Kerley B-lines and cardiac silhouette size enlargement.  Findings most likely represent interstitial pulmonary edema.  Less likely could an interstitial infectious process cause this appearance.    2.  Stable findings as noted above.      Electronically signed by: Trev Dean MD  Date:    07/01/2020  Time:    14:06             Narrative:    EXAMINATION:  XR CHEST AP PORTABLE    CLINICAL HISTORY:  Heart failure, unspecified    COMPARISON:  March 26, 2019    FINDINGS:  Small effusions.  Vascular congestion with Kerley B lines.  Moderate basilar interstitial changes.  The upper lungs are clear. The cardiac silhouette size is enlarged.  The trachea is midline and the mediastinal width is normal. Negative for pneumothorax.  Negative for osseous abnormalities. Tortuous aorta with calcifications of the aortic knob.  Degenerative changes of the spine and both shoulder girdles.                                  Assessment/Plan:     * Acute on chronic combined systolic and diastolic congestive heart failure  Continue Lasix diuresis.     Stage 5 chronic kidney disease  Patient presented with mild GREGG on CKD 5. Creatinine now back at baseline (Cr at 5.1 today). No need for dialysis at present. Avoid NSAIDs, ACE-I, ARB. Will monitor closely.   Patient has maturing left arm AVF in place.    Anemia in stage 4 chronic kidney disease  Hgb at 8.9 today, monitor.         Thank you for your consult. I will follow-up with patient. Please contact us if you have any additional questions.    Dudley Hernandez MD  Nephrology  [facility] -

## 2020-07-04 NOTE — ASSESSMENT & PLAN NOTE
Patient presented with mild GREGG on CKD 5. Creatinine now back at baseline (Cr at 5.1 today). No need for dialysis at present. Avoid NSAIDs, ACE-I, ARB. Will monitor closely.   Patient has maturing left arm AVF in place.

## 2020-07-04 NOTE — DISCHARGE SUMMARY
[facility] Hospital Medicine  Discharge Summary      Patient Name: Avril Monzon  MRN: [id number]  Admission Date: 7/1/2020  Hospital Length of Stay: 3 days  Discharge Date and Time:  07/04/2020 5:48 PM  Attending Physician: Ren Yan MD   Discharging Provider: Ren Yan MD  Primary Care Provider: Rose Parks MD      HPI:   74 y/o AAF  With a PMHx of  CKD stage 5 ,  Systolic and diastolic CHF ,  H/O CAD , Chronic anemia , T2DM  , H/O CVA  And H/O NSTEMI presented to the ER with a C/O  SOB for the last  3 days which has gotten worse . She report the SOB  Is associated  With PND , ALVARADO , dry cough  and B/L LE  .  She denies any  chest pain , fever , Chills , nausea , vomit ,  or GI sx . She has a H/O CKD stage 5  abd Systolic CHF . She  went this am  To see her Hematology for a iron infusion . Pt report start felling more SOB after Iron infusion was started .  ER COURSE: CXR show pulmonary congestion , BNP > 1000, troponin level 0.035 , BUN/Cr 88/5. Pt was started on BIPAP due to  Respiratory distress . Pt with a significant elevated BP and started on NTG drip    ER VS :  BP Pulse Resp Temp SpO2   (!) 235/95 68 (!) 22 97.7 °F (36.5 °C) 97 %               * No surgery found *      Hospital Course:   74 y/o aaf admitted to ICU on NIV with a dx of acute on chronic systolic and diastolic CHF exacerbation , Volume overload , Uncontrolled HTN , Acute hypoxic respiratory failure  And CKD stage 5 . She was started on NIV , NTG drip and lasix drip with and improvement of her sx . She is (-) 2.3 lt since admission . She is wean off  BIPAP .   She cont on NTG drip and lasix drip . The BP has improved  . Her home BP medication were resume .   7/3 Pt was seen and examined at bedside . She report feeling better . The BP cont  elevated and medicatioms were changed  . The  BNP is 750 ( down from 1100) The CXR  Show some  vascular congestion ( improved form admission ).    7/4: patient clinically improved. Was able  to wean to 2L NC. Home O2 protocol ordered and patient qualified for home O2. Oxygen delivered at bedside. Prescription for 80mg lasix bid ordered and sent to pharmacy. Recommended O2 use at home PRN. Instructed to follow up with cardiology next week.      Consults:   Consults (From admission, onward)        Status Ordering Provider     Inpatient consult to Cardiology  Once     Provider:  Demetrio Mejia MD    Completed NIMCO MCDOWELL     Inpatient consult to Nephrology  Once     Provider:  Leonard Bryan MD    Completed ENRIQUETA BERNARDO          No new Assessment & Plan notes have been filed under this hospital service since the last note was generated.  Service: Hospital Medicine    Final Active Diagnoses:    Diagnosis Date Noted POA    Stage 5 chronic kidney disease [N18.5] 11/30/2018 Yes    Coronary artery disease involving native coronary artery of native heart [I25.10] 11/30/2018 Yes    Pulmonary hypertension [I27.20] 11/28/2018 Yes    Anemia in stage 4 chronic kidney disease [N18.4, D63.1] 05/18/2017 Yes     Chronic    Type 2 diabetes mellitus with circulatory disorder, with long-term current use of insulin [E11.59, Z79.4] 11/11/2016 Not Applicable    Hypertension associated with diabetes [E11.59, I10]  Yes     Chronic      Problems Resolved During this Admission:    Diagnosis Date Noted Date Resolved POA    PRINCIPAL PROBLEM:  Acute on chronic combined systolic and diastolic congestive heart failure [I50.43] 12/12/2018 07/04/2020 Yes    Acute pulmonary edema [J81.0] 07/01/2020 07/04/2020 Yes       Discharged Condition: good    Disposition: Home or Self Care    Follow Up:  Follow-up Information     Demetrio Mejia Md, MD. Schedule an appointment as soon as possible for a visit in 1 week.    Specialties: Cardiology, Internal Medicine  Contact information:  [id number]  [place] [address]  [phone]                 Patient Instructions:      OXYGEN FOR HOME USE     Order Specific  "Question Answer Comments   Liter Flow 4    Duration Continuous    Qualifying SpO2: 87    Testing done at: Rest    Route nasal cannula    Device home concentrator with portable unit    Length of need (in months): 12 mos    Patient condition with qualifying saturation CHF    Height: 5' 6" (1.676 m)    Weight: 66.2 kg (146 lb)    Does patient have medical equipment at home? glucometer    Alternative treatment measures have been tried or considered and deemed clinically ineffective. Yes      Diet Cardiac       Significant Diagnostic Studies:   Recent Results (from the past 336 hour(s))   CBC auto differential    Collection Time: 07/04/20  4:50 AM   Result Value Ref Range    WBC 5.30 3.90 - 12.70 K/uL    Hemoglobin 8.9 (L) 12.0 - 16.0 g/dL    Hematocrit 28.3 (L) 37.0 - 48.5 %    Platelets 185 150 - 350 K/uL   CBC auto differential    Collection Time: 07/03/20  3:34 AM   Result Value Ref Range    WBC 8.01 3.90 - 12.70 K/uL    Hemoglobin 9.3 (L) 12.0 - 16.0 g/dL    Hematocrit 29.2 (L) 37.0 - 48.5 %    Platelets 185 150 - 350 K/uL   CBC auto differential    Collection Time: 07/02/20  5:27 AM   Result Value Ref Range    WBC 8.18 3.90 - 12.70 K/uL    Hemoglobin 9.0 (L) 12.0 - 16.0 g/dL    Hematocrit 29.2 (L) 37.0 - 48.5 %    Platelets 175 150 - 350 K/uL     No results found for this or any previous visit (from the past 336 hour(s)).  X-Ray Chest 1 View  Narrative: EXAMINATION:  XR CHEST 1 VIEW    CLINICAL HISTORY:  sob;    COMPARISON:  July 1, 2020    FINDINGS:  Worsening small effusions.  Interval development of a ground-glass opacity in the right upper lung.  Similar degree of vascular congestion with less prominent Kerley B-lines currently.  The hilar and mediastinal contours and osseous structures are otherwise unchanged.  Stable cardiac silhouette size enlargement, tortuosity of the aorta and calcifications of the aortic knob.  Impression: 1.  Interval development of ground-glass opacity within the right upper lung.  " Worsening left effusion.  Stable small right effusion.  Similar degree of vascular congestion with improved Kerley B lines.  Stable cardiac silhouette size enlargement.  Again, interstitial pulmonary edema versus interstitial infectious process must be considered.    Electronically signed by: Trev Dean MD  Date:    07/03/2020  Time:    08:01        Pending Diagnostic Studies:     None         Medications:  Reconciled Home Medications:      Medication List      CHANGE how you take these medications    atorvastatin 80 MG tablet  Commonly known as: LIPITOR  Take 1 tablet (80 mg total) by mouth once daily.  What changed: how much to take     furosemide 80 MG tablet  Commonly known as: LASIX  Take 1 tablet (80 mg total) by mouth 2 (two) times daily.  What changed:   · medication strength  · how much to take  · how to take this  · when to take this        CONTINUE taking these medications    ACCU-CHEK ARDEN PLUS METER [facility]  Generic drug: blood-glucose meter  USE TO TEST TWICE DAILY     amLODIPine 5 MG tablet  Commonly known as: NORVASC  Take 5 mg by mouth once daily.     aspirin 81 MG EC tablet  Commonly known as: ECOTRIN  Take 1 tablet (81 mg total) by mouth once daily.     BiDiL 20-37.5 mg Tab  Generic drug: isosorbide-hydrALAZINE 20-37.5 mg  TAKE 2 TABLETS BY MOUTH THREE TIMES DAILY     blood sugar diagnostic Strp  1 strip by Misc.(Non-Drug; Combo Route) route 3 (three) times daily. relion ultima     calcitRIOL 0.25 MCG Cap  Commonly known as: ROCALTROL  Take 1 capsule by mouth once daily     clopidogreL 75 mg tablet  Commonly known as: PLAVIX  TAKE 1 TABLET BY MOUTH ONCE DAILY     fluticasone propionate 50 mcg/actuation nasal spray  Commonly known as: FLONASE  1 spray (50 mcg total) by Each Nostril route once daily.     gabapentin 300 MG capsule  Commonly known as: NEURONTIN  Take 1 capsule (300 mg total) by mouth every evening.     insulin NPH-insulin regular (70/30) 100 unit/mL (70-30) injection  Commonly known  as: NovoLIN 70/30 U-100 Insulin  INJECT SUBCUTANEOUSLY 30 UNITS IN THE MORNING AND INJECT 25 UNITS IN THE EVENING     lancets Misc  Commonly known as: ACCU-CHEK SOFTCLIX LANCETS  1 lancet by Misc.(Non-Drug; Combo Route) route 3 (three) times daily.     loratadine 10 mg tablet  Commonly known as: CLARITIN  Take 1 tablet (10 mg total) by mouth once daily.     metoprolol tartrate 25 MG tablet  Commonly known as: LOPRESSOR  TAKE 1 TABLET BY MOUTH TWICE DAILY     nitroGLYCERIN 0.4 MG SL tablet  Commonly known as: NITROSTAT  Place 1 tablet (0.4 mg total) under the tongue every 5 (five) minutes as needed for Chest pain.     ondansetron 4 MG Tbdl  Commonly known as: ZOFRAN-ODT  DISSOLVE 1 TABLET IN MOUTH THREE TIMES DAILY AS NEEDED FOR NAUSEA FOR 5 DAYS     sodium bicarbonate 325 MG tablet  TAKE 1 TABLET BY MOUTH THREE TIMES DAILY     torsemide 20 MG Tab  Commonly known as: DEMADEX  Take 2 tablets (40 mg total) by mouth once daily.            Indwelling Lines/Drains at time of discharge:   Lines/Drains/Airways     None                 Time spent on the discharge of patient: 40 minutes  Patient was seen and examined on the date of discharge and determined to be suitable for discharge.         Ren Yan MD  Department of Hospital Medicine  [facility]

## 2020-07-05 NOTE — HOSPITAL COURSE
Avril Monzon is a 75 y.o. female pt admitted to ICU on NIV with a dx of acute on chronic systolic and diastolic CHF exacerbation , Volume overload , Uncontrolled HTN , Acute hypoxic respiratory failure  And CKD stage 5 . She was started on NIV , NTG drip and lasix drip with and improvement of her sx . She is (-) 2.3 lt since admission . She is wean off  BIPAP .   She cont on NTG drip and lasix drip . The BP has improved  . Her home BP medication were resume .   She has improved with IV diuresis, cardiology consulted for CHF. Discussed will need volume removal and will work up pulm HTN as outpt. Pt feels well now likely will be DC home.           Results for orders placed during the hospital encounter of 07/01/20   Echo Color Flow Doppler? Yes     Narrative · Concentric left ventricular hypertrophy.  · Normal left ventricular systolic function. The estimated ejection   fraction is 60%.  · Grade II (moderate) left ventricular diastolic dysfunction consistent   with pseudonormalization.  · Normal right ventricular systolic function.  · Mild mitral regurgitation.  · Mild to moderate tricuspid regurgitation.  · Mild pulmonic regurgitation.  · Moderate mitral prolapse of the anterior mitral leaflet.  · Normal central venous pressure (3 mmHg).  · The estimated PA systolic pressure is 68 mmHg.  · Pulmonary hypertension present.     7/4/20 - changed to lasix 80 with BIdil, pt feels well ready for DC today. NO acute events overnight. Will follow up in CV clinic

## 2020-07-05 NOTE — SUBJECTIVE & OBJECTIVE
Past Medical History:   Diagnosis Date    Acute hypoxemic respiratory failure 11/26/2018    Anemia     Arthritis     back, hand, knees    Back pain     Cataract     CKD stage G4/A3, GFR 15 - 29 and albumin creatinine ratio >300 mg/g     GFR 40% Jan 2014 and 33% ub 3/2014 ([facility])    Coronary artery disease involving native coronary artery of native heart 11/30/2018    Diabetic retinopathy     DM (diabetes mellitus) type II controlled, neurological manifestation     Exudative age-related macular degeneration of left eye with active choroidal neovascularization 2/5/2019    GERD (gastroesophageal reflux disease)     Glaucoma     Heart failure     Hyperlipidemia     Hypertension     Non-ST elevation MI (NSTEMI) 11/28/2018    NSTEMI (non-ST elevated myocardial infarction) 11/27/2018    Peripheral neuropathy     Polyneuropathy     Proteinuria     >6 mo     Seizures     [facility] 1/2014 -dick CT NRI showed small vessel    Stroke 2012,2014    Tobacco dependence     Type 2 diabetes with peripheral circulatory disorder, controlled        Past Surgical History:   Procedure Laterality Date    BREAST LUMPECTOMY Left     benign, when patient was 15 years old    BREAST MASS EXCISION      ,benign, age 15.    CATHETERIZATION OF BOTH LEFT AND RIGHT HEART N/A 11/28/2018    Procedure: CATHETERIZATION, HEART, BOTH LEFT AND RIGHT;  Surgeon: Michelle Holman MD;  Location: [facility] CATH LAB;  Service: Cardiology;  Laterality: N/A;    CATHETERIZATION OF BOTH LEFT AND RIGHT HEART N/A 11/30/2018    Procedure: CATHETERIZATION, HEART, BOTH LEFT AND RIGHT;  Surgeon: Michelle Holman MD;  Location: [facility] CATH LAB;  Service: Cardiology;  Laterality: N/A;    HYSTERECTOMY  1982    INSERTION OF SHUNT      LEFT HEART CATHETERIZATION Left 12/3/2018    Procedure: CATHETERIZATION, HEART, LEFT;  Surgeon: Michelle Holman MD;  Location: [facility] CATH LAB;  Service: Cardiology;  Laterality: Left;  LAD ATHERECTOMY STENT/ FEMORAL APPROACH     OOPHORECTOMY      wrist cyst Right 1980s    dorsal wrist cyst       Review of patient's allergies indicates:   Allergen Reactions    Codeine Swelling    Darvon [propoxyphene] Swelling       Current Facility-Administered Medications on File Prior to Encounter   Medication    aflibercept Soln 2 mg     Current Outpatient Medications on File Prior to Encounter   Medication Sig    ACCU-CHEK ARDEN PLUS METER [facility] USE TO TEST TWICE DAILY    amLODIPine (NORVASC) 5 MG tablet Take 5 mg by mouth once daily.    aspirin (ECOTRIN) 81 MG EC tablet Take 1 tablet (81 mg total) by mouth once daily.    atorvastatin (LIPITOR) 80 MG tablet Take 1 tablet (80 mg total) by mouth once daily. (Patient taking differently: Take 40 mg by mouth once daily. )    BIDIL 20-37.5 mg Tab TAKE 2 TABLETS BY MOUTH THREE TIMES DAILY    blood sugar diagnostic Strp 1 strip by Misc.(Non-Drug; Combo Route) route 3 (three) times daily. relion ultima    calcitRIOL (ROCALTROL) 0.25 MCG Cap Take 1 capsule by mouth once daily    clopidogrel (PLAVIX) 75 mg tablet TAKE 1 TABLET BY MOUTH ONCE DAILY    fluticasone propionate (FLONASE) 50 mcg/actuation nasal spray 1 spray (50 mcg total) by Each Nostril route once daily.    gabapentin (NEURONTIN) 300 MG capsule Take 1 capsule (300 mg total) by mouth every evening.    insulin NPH-insulin regular, 70/30, (NOVOLIN 70/30) 100 unit/mL (70-30) injection INJECT SUBCUTANEOUSLY 30 UNITS IN THE MORNING AND INJECT 25 UNITS IN THE EVENING (Patient taking differently: INJECT SUBCUTANEOUSLY 30 UNITS IN THE MORNING AND INJECT 25 UNITS IN THE EVENING)    lancets (ACCU-CHEK SOFTCLIX LANCETS) Misc 1 lancet by Misc.(Non-Drug; Combo Route) route 3 (three) times daily.    loratadine (CLARITIN) 10 mg tablet Take 1 tablet (10 mg total) by mouth once daily.    metoprolol tartrate (LOPRESSOR) 25 MG tablet TAKE 1 TABLET BY MOUTH TWICE DAILY    nitroGLYCERIN (NITROSTAT) 0.4 MG SL tablet Place 1 tablet (0.4 mg total) under the  tongue every 5 (five) minutes as needed for Chest pain.    ondansetron (ZOFRAN-ODT) 4 MG TbDL DISSOLVE 1 TABLET IN MOUTH THREE TIMES DAILY AS NEEDED FOR NAUSEA FOR 5 DAYS    sodium bicarbonate 325 MG tablet TAKE 1 TABLET BY MOUTH THREE TIMES DAILY    torsemide (DEMADEX) 20 MG Tab Take 2 tablets (40 mg total) by mouth once daily.    [DISCONTINUED] furosemide (LASIX) 20 MG tablet      Family History     Problem Relation (Age of Onset)    Cancer Maternal Grandmother    Diabetes Mother    Heart disease Mother    Hyperlipidemia Mother    Hypertension Mother    Muscular dystrophy Son        Tobacco Use    Smoking status: Former Smoker     Packs/day: 1.50     Years: 30.00     Pack years: 45.00     Types: Cigarettes     Quit date: 1990     Years since quittin.5    Smokeless tobacco: Former User   Substance and Sexual Activity    Alcohol use: No     Alcohol/week: 0.0 standard drinks    Drug use: No    Sexual activity: Never     Review of Systems   Constitution: Positive for decreased appetite, malaise/fatigue and weight gain.   HENT: Negative.    Eyes: Negative.    Cardiovascular: Positive for dyspnea on exertion and leg swelling. Negative for chest pain.   Respiratory: Positive for cough and shortness of breath.    Endocrine: Negative.    Hematologic/Lymphatic: Negative.    Skin: Negative.    Gastrointestinal: Negative.    Genitourinary: Negative.    Neurological: Negative.    Psychiatric/Behavioral: Negative.    Allergic/Immunologic: Negative.      Objective:     Vital Signs (Most Recent):  Temp: 98.5 °F (36.9 °C) (20 1632)  Pulse: 74 (20 1700)  Resp: 18 (20 1632)  BP: 125/60 (20 1632)  SpO2: 97 % (20 1632) Vital Signs (24h Range):  Temp:  [98.2 °F (36.8 °C)-99 °F (37.2 °C)] 98.5 °F (36.9 °C)  Pulse:  [64-74] 74  Resp:  [16-18] 18  SpO2:  [97 %-100 %] 97 %  BP: (115-171)/(58-76) 125/60     Weight: 66.2 kg (146 lb)  Body mass index is 23.57 kg/m².    SpO2: 97 %  O2 Device  (Oxygen Therapy): nasal cannula      Intake/Output Summary (Last 24 hours) at 7/4/2020 2007  Last data filed at 7/4/2020 1800  Gross per 24 hour   Intake 624 ml   Output --   Net 624 ml       Lines/Drains/Airways     None                 Physical Exam   Constitutional: She is oriented to person, place, and time. She appears well-developed and well-nourished. No distress.   HENT:   Head: Normocephalic and atraumatic.   Nose: Nose normal.   Mouth/Throat: Oropharynx is clear and moist.   Eyes: Conjunctivae and EOM are normal. No scleral icterus.   Neck: Normal range of motion. Neck supple. No thyromegaly present.   Cardiovascular: Normal rate, regular rhythm, S1 normal and S2 normal. Exam reveals no gallop, no S3, no S4 and no friction rub.   Murmur heard.  Pulmonary/Chest: Effort normal and breath sounds normal. No stridor. No respiratory distress. She has no wheezes. She has no rales. She exhibits no tenderness.   Abdominal: Soft. Bowel sounds are normal. She exhibits no distension and no mass. There is no abdominal tenderness. There is no rebound.   Genitourinary:    Genitourinary Comments: Deferred     Musculoskeletal: Normal range of motion.         General: Edema (trace - resolved) present. No tenderness or deformity.   Lymphadenopathy:     She has no cervical adenopathy.   Neurological: She is alert and oriented to person, place, and time. She exhibits normal muscle tone. Coordination normal.   Skin: Skin is warm and dry. No rash noted. She is not diaphoretic. No erythema. No pallor.   Psychiatric: She has a normal mood and affect. Her behavior is normal. Judgment and thought content normal.   Nursing note and vitals reviewed.      Significant Labs:   All pertinent lab results from the last 24 hours have been reviewed. and   Recent Lab Results       07/04/20  1639   07/04/20  1036   07/04/20  0450   07/03/20  2115        Albumin     2.7            2.7       Alkaline Phosphatase     58       ALT     39        Anion Gap     12            12       AST     16       Baso #     0.01       Basophil%     0.2       BILIRUBIN TOTAL     0.2  Comment:  For infants and newborns, interpretation of results should be based  on gestational age, weight and in agreement with clinical  observations.  Premature Infant recommended reference ranges:  Up to 24 hours.............<8.0 mg/dL  Up to 48 hours............<12.0 mg/dL  3-5 days..................<15.0 mg/dL  6-29 days.................<15.0 mg/dL         BUN, Bld     81            81       Calcium     8.6            8.6       Chloride     105            105       CO2     26            26       Creatinine     5.1            5.1       Differential Method     Automated       eGFR if      9            9       eGFR if non      8  Comment:  Calculation used to obtain the estimated glomerular filtration  rate (eGFR) is the CKD-EPI equation.               8  Comment:  Calculation used to obtain the estimated glomerular filtration  rate (eGFR) is the CKD-EPI equation.          Eos #     0.1       Eosinophil%     1.3       Glucose     153            153       Gran # (ANC)     4.2       Gran%     78.5       Hematocrit     28.3       Hemoglobin     8.9       Immature Grans (Abs)     0.02  Comment:  Mild elevation in immature granulocytes is non specific and   can be seen in a variety of conditions including stress response,   acute inflammation, trauma and pregnancy. Correlation with other   laboratory and clinical findings is essential.         Immature Granulocytes     0.4       Lymph #     0.7       Lymph%     12.8       MCH     30.1       MCHC     31.4       MCV     96       Mono #     0.4       Mono%     6.8       MPV     10.5       nRBC     0       Phosphorus     4.3       Platelets     185       POCT Glucose 180 118   190     Potassium     3.7            3.7       PROTEIN TOTAL     5.9       RBC     2.96       RDW     12.6       Sodium     143            143        WBC     5.30             Significant Imaging: Echocardiogram:   2D echo with color flow doppler:   Results for orders placed or performed during the hospital encounter of 11/26/18   2D echo with color flow doppler   Result Value Ref Range    QEF 45 55 - 65    Mitral Valve Regurgitation MILD TO MODERATE     Diastolic Dysfunction Yes (A)     Est. PA Systolic Pressure 69.15 (A)     Tricuspid Valve Regurgitation MILD     Narrative    Date of Procedure: 11/27/2018        TEST DESCRIPTION   Technical Quality: This is a portable study performed at the patient's bedside. This is a technically challenging study. There is poor endocardial definition.     Aorta: The aortic root is normal in size, measuring 2.6 cm at sinotubular junction and 2.6 cm at Sinuses of Valsalva. The proximal ascending aorta is normal in size, measuring 2.6 cm across.     Left Atrium: The left atrial volume index is normal, measuring 31.92 cc/m2.     Left Ventricle: The left ventricle is normal in size, with an end-diastolic diameter of 4.6 cm, and an end-systolic diameter of 3.2 cm. LV wall thickness is normal, with the septum measuring 1.0 cm and the posterior wall measuring 0.9 cm across. Relative   wall thickness was normal at 0.39, and the LV mass index was 92.3 g/m2 consistent with normal left ventricular mass. The following segments were akinetic: apical septum, apical inferior wall.  The following segments were moderately hypokinetic: mid inferoseptum.  The following segments were severely hypokinetic: mid anteroseptum.  Left ventricular systolic function appears mildly depressed. Visually estimated ejection fraction is 45-50%. The LV Doppler derived stroke volume equals 99.0 ccs.     Diastolic indices: E wave velocity 1.2 m/s, E/A ratio 1.3,  msec., E/e' ratio(avg) 13. There is diastolic dysfunction secondary to relaxation abnormality.     Right Atrium: The right atrium is normal in size, measuring 4.0 cm in length and 3.0 cm in  width in the apical view.     Right Ventricle: The right ventricle is normal in size. Global right ventricular systolic function appears normal. Tricuspid annular plane systolic excursion (TAPSE) is 2.0 cm. The estimated PA systolic pressure is 69 mmHg.     Aortic Valve:  The peak gradient obtained across the aortic valve is 13 mmHg, with a mean gradient of 7 mmHg. Using a left ventricular outflow tract diameter of 2.0 cm, a left ventricular outflow tract velocity time integral of 33 cm, and a peak   instantaneous transvalvular velocity time integral of 34 cm, the calculated aortic valve area is 2.91 cm2(AVAi is 1.57 cm2/m2).     Mitral Valve:  The pressure half time is 42 msec. The calculated mitral valve area is 5.24 cm2. There is mild to moderate mitral regurgitation.     Tricuspid Valve:  There is mild tricuspid regurgitation.     IVC: IVC is enlarged but collapses > 50% with a sniff, suggesting intermediate right atrial pressure of 8 mmHg.     Atrial Septum: The atrial septum is intact.     Intracavitary: There is no evidence of pericardial effusion, intracavity mass, thrombi, or vegetation.     Other: There is a right pleural effusion present.         CONCLUSIONS     1 - Wall motion abnormalities.     2 - Mildly depressed left ventricular systolic function (EF 45-50%).     3 - Impaired LV relaxation, normal LAP (grade 1 diastolic dysfunction).     4 - Normal right ventricular systolic function .     5 - Pulmonary hypertension. The estimated PA systolic pressure is 69 mmHg.     6 - Mild to moderate mitral regurgitation.     7 - Mild tricuspid regurgitation.     8 - Intermediate central venous pressure.     9 - Right pleural effusion.             This document has been electronically    SIGNED BY: Michelle Holman MD On: 11/27/2018 10:58    This document was originally electronically signed on: 11/27/2018 10:57    and X-Ray: CXR: X-Ray Chest 1 View (CXR):   Results for orders placed or performed during the hospital  encounter of 07/01/20   X-Ray Chest 1 View    Narrative    EXAMINATION:  XR CHEST 1 VIEW    CLINICAL HISTORY:  sob;    COMPARISON:  July 1, 2020    FINDINGS:  Worsening small effusions.  Interval development of a ground-glass opacity in the right upper lung.  Similar degree of vascular congestion with less prominent Kerley B-lines currently.  The hilar and mediastinal contours and osseous structures are otherwise unchanged.  Stable cardiac silhouette size enlargement, tortuosity of the aorta and calcifications of the aortic knob.      Impression    1.  Interval development of ground-glass opacity within the right upper lung.  Worsening left effusion.  Stable small right effusion.  Similar degree of vascular congestion with improved Kerley B lines.  Stable cardiac silhouette size enlargement.  Again, interstitial pulmonary edema versus interstitial infectious process must be considered.      Electronically signed by: Trev Dean MD  Date:    07/03/2020  Time:    08:01

## 2020-07-06 ENCOUNTER — PATIENT OUTREACH (OUTPATIENT)
Dept: ADMINISTRATIVE | Facility: CLINIC | Age: 75
End: 2020-07-06

## 2020-07-06 NOTE — PATIENT INSTRUCTIONS
Discharge Instructions for Heart Failure  The heart is a muscle that pumps oxygen-rich blood to all parts of the body. When you have heart failure, the heart is not able to pump as well as it should. Blood and fluid may back up into the lungs (congestive heart failure), and some parts of the body dont get enough oxygen-rich blood to work normally. These problems lead to the symptoms of heart failure. Heart failure can occur due to an injury to the heart or from natural processes.  You can control symptoms of heart failure with some lifestyle changes and by following your doctor's advice.  Home care  Activity  Ask your healthcare provider about an exercise program. You can benefit from simple activities such as walking or gardening. Exercising most days of the week can make you feel better. Don't be discouraged if your progress is slow at first. Rest as needed. Stop activity if you develop symptoms such as chest pain, lightheadedness, or significant shortness of breath. Find activities that you enjoy, such as brisk walking, dancing, swimming, or gardening. These will help you stay active and strengthen your heart.  Diet  Follow a heart healthy diet. And make sure to limit the salt (sodium) in your diet. Salt causes your body to hold water. This makes your heart work harder as there is more fluid for the heart to pump. Limit your salt by doing the following:  · Limit canned, dried, packaged, and fast foods.  · Don't add salt to your food.  · Season foods with herbs instead of salt.  · Watch how much liquids you drink. Drinking too much can make heart failure worse. Talk with your health care provider about how much you should drink each day.  · Limit the amount of alcohol you drink. It may harm your heart. Women should have no more than 1 drink a day and men should have no more than 2.  · When you eat out, request that your meals have no added salt.  Tobacco  If you smoke, it's very important to quit. Smoking  increases your chances of having a heart attack by harming the blood vessels that provide oxygen to your heart. This makes heart failure worse. Quitting smoking is the number one thing you can do to improve your health. Enroll in a stop-smoking program to improve your chances of success. Talk with your healthcare provider about medicines or nicotine replacement therapy to help you quit smoking. Ask your healthcare provider about smoking cessation support groups.  Medicine  Take your medicines exactly as prescribed. Learn the names and purpose of each of your medicines. Keep an accurate medicine list and current dosages with you at all times. Don't skip doses. If you miss a dose of your medicine, take it as soon as you remember. If you miss a dose and it's almost time for your next dose, just wait and take your next dose at the normal time. Don't take a double dose. If you are unsure, call your doctor's office. Make sure not to mix up your medicines or forget what you've taken the same day.  Weight monitoring  Weigh yourself every day. A sudden weight gain can mean your heart failure is getting worse. Weigh yourself at the same time of day and in the same kind of clothes. Ideally, weigh yourself first thing in the morning after you empty your bladder, but before you eat breakfast. Your healthcare provider will show you how to track your weight. He or she will also discuss with you when you should call if you have a sudden, unexpected increase in your weight.  In general, your healthcare provider may ask you to report if your weight goes up by more than 2 pounds in 1 day,  5 pounds in 1 week, or whatever weight gain you were told by your doctor. This is a sign that you are retaining more fluid than you should be. Clues to weight gain include checking your ankles for swelling, or noticing you are short of breath when you lie down.  Follow-up care  Make a follow-up appointment as directed. Depending on the type and  severity of heart failure you have, you may need follow-up as early as 7 days from hospital discharge. Keep appointments for checkups and lab tests that are needed to check your medicines and condition.  Recognize that your health and even survival depend on your following medical recommendations.  Symptoms  Heart failure can cause a variety of symptoms, including:  · Shortness of breath  · Trouble breathing at night, especially when you lie down  · Swelling in the legs and feet or in the belly (abdomen)  · Becoming easily fatigued  · Irregular or rapid heartbeat  · Weakness or lightheadedness  · Swelling of the neck veins  It is important to know what to do if symptoms get worse or if you develop signs of worsening heart failure.     When to see your healthcare provider  Call your doctor right away if you have any of these signs of worsening heart failure:  · Sudden weight gain (more than 2 pounds in 1 day or 5 pounds in 1 week, or whatever weight gain you were told to report by your doctor)  · Trouble breathing not related to being active  · New or increased swelling of your legs or ankles  · Swelling or pain in your abdomen  · Breathing trouble at night (waking up short of breath, needing more pillows to breathe)  · Frequent coughing that doesn't go away  · Feeling much more tired than usual  Call 911  Call 911 right away if you have:  · Severe shortness of breath, such that you can't catch your breath even while resting  · Severe chest pain that does not resolve with rest or nitroglycerin  · Pink, foamy mucus with cough and shortness of breath  · A continuous rapid or irregular heartbeat  · Passing out or fainting  · Stroke symptoms such as sudden numbness or weakness on one side of your face, arm, or leg or sudden confusion, trouble speaking or vision changes   Date Last Reviewed: 3/21/2016  © 0111-9954 [employer]. [address] [place] [address], [place] [address]. All rights reserved. This information  is not intended as a substitute for professional medical care. Always follow your healthcare professional's instructions.        Taking a Diuretic  Your healthcare provider has prescribed a diuretic, or water pill, to help your body get rid of excess water and salt and maintain appropriate fluid balance. Taking your diuretic can help you feel better, breathe better, move more easily, and have more energy.     Take your medication early in the day at the same time each day.      The name of my diuretic is:     ________________________________________   Medicine tips  · Read the fact sheet that comes with your medicine. It tells you when and how to take it. Ask for a medicine sheet if you dont get one.  · If you take 2 or more doses each day, take the last one before dinner if you can. That way youll get up fewer times during the night to go to the bathroom. However, make sure you have enough time between doses during the day.   · If you miss a dose, take it as soon as you remember. If it is almost time for the next dose, skip the missed dose. Do not take a double dose.  · If you miss 2 or more consecutive doses, call your healthcare provider. You may be at risk for fluid buildup.  For your safety  · Follow your doctors guidelines for potassium intake. You may need to take a potassium supplement. Or, you may need to avoid potassium supplements, salt substitutes with potassium, or large amounts of high-potassium foods (such as bananas, potatoes, broccoli, and milk). Recommendations for potassium will depend on the type of diuretic you are prescribed along with your kidney function and other factors. Your healthcare provider will likely want to check your potassium level regularly while you are taking this medicine.  · Talk to your healthcare provider about whether it is safe for you to drink alcohol while taking this medicine.  · Get up slowly when you are sitting or lying down. This helps prevent dizziness  and falls due to dizziness.  · Ask your healthcare provider or pharmacist before you take any other prescription or nonprescription medicine or herbal supplements. Some of them may interact with your diuretic and keep it from working correctly.  · Limit exposure to sunlight. A diuretic may increase your sensitivity to the sun. Even brief sun exposure may cause skin rash, itching, redness, or other discoloration. It may also lead to severe sunburn. To protect your skin do the following:  ¨ Avoid direct sunlight, especially between 10 a.m. and 2 p.m., whenever possible.  ¨ Apply a daily sunblock of at least SPF 15 (or higher) to any exposed skin, including your lips.  ¨ Wear protective clothing, such as a hat and sunglasses, when you are outdoors.  ¨ Avoid sunlamps and tanning booths.  ¨ Long-sleeve shirts and pants in the summer can help protect your skin.        When to seek medical advice  Call your healthcare provider right away if any of these occur:  · Have diarrhea, constipation, nausea or vomiting.  · Lose your appetite or notice a rapid or excessive weight gain.  · Feel extremely tired or weak.  · Have shortness of breath or difficulty breathing or swallowing.  · Have numbness or tingling in your hands, feet, or lips or a ringing in your ears.  · Feel lightheaded when getting up after sitting or lying down.  · Have headaches, blurred vision, or feel a sense of confusion.  · Have muscle cramps or joint pain.  · Have chest pains or changes in your heartbeat.  · Have an excessive thirst or a dry mouth.  · Notice a skin rash.  · Gain more than 2 pounds in 1 day or 4 pounds in 1 week (ask your healthcare provider for his or her specific direction).  · Have any other unusual symptoms.   Date Last Reviewed: 6/1/2016  © 3160-8224 [employer]. [address] [place] [address], [place] [address]. All rights reserved. This information is not intended as a substitute for professional medical care. Always follow your  healthcare professional's instructions.

## 2020-07-08 ENCOUNTER — PATIENT OUTREACH (OUTPATIENT)
Dept: ADMINISTRATIVE | Facility: OTHER | Age: 75
End: 2020-07-08

## 2020-07-10 ENCOUNTER — OFFICE VISIT (OUTPATIENT)
Dept: CARDIOLOGY | Facility: CLINIC | Age: 75
End: 2020-07-10
Payer: MEDICARE

## 2020-07-10 VITALS
OXYGEN SATURATION: 99 % | DIASTOLIC BLOOD PRESSURE: 60 MMHG | BODY MASS INDEX: 22.7 KG/M2 | HEART RATE: 60 BPM | WEIGHT: 140.63 LBS | SYSTOLIC BLOOD PRESSURE: 148 MMHG

## 2020-07-10 DIAGNOSIS — I36.1 NON-RHEUMATIC TRICUSPID VALVE INSUFFICIENCY: ICD-10-CM

## 2020-07-10 DIAGNOSIS — I11.0 ACCELERATED HYPERTENSION WITH DIASTOLIC CONGESTIVE HEART FAILURE, NYHA CLASS 3: Primary | ICD-10-CM

## 2020-07-10 DIAGNOSIS — N18.4 CHRONIC KIDNEY DISEASE (CKD) STAGE G4/A3, SEVERELY DECREASED GLOMERULAR FILTRATION RATE (GFR) BETWEEN 15-29 ML/MIN/1.73 SQUARE METER AND ALBUMINURIA CREATININE RATIO GREATER THAN 300 MG/G: ICD-10-CM

## 2020-07-10 DIAGNOSIS — E78.5 HYPERLIPIDEMIA ASSOCIATED WITH TYPE 2 DIABETES MELLITUS: ICD-10-CM

## 2020-07-10 DIAGNOSIS — N18.5 STAGE 5 CHRONIC KIDNEY DISEASE: ICD-10-CM

## 2020-07-10 DIAGNOSIS — I25.118 CORONARY ARTERY DISEASE OF NATIVE ARTERY OF NATIVE HEART WITH STABLE ANGINA PECTORIS: ICD-10-CM

## 2020-07-10 DIAGNOSIS — Z86.73 HISTORY OF CVA (CEREBROVASCULAR ACCIDENT): ICD-10-CM

## 2020-07-10 DIAGNOSIS — I50.30 ACCELERATED HYPERTENSION WITH DIASTOLIC CONGESTIVE HEART FAILURE, NYHA CLASS 3: Primary | ICD-10-CM

## 2020-07-10 DIAGNOSIS — E11.59 HYPERTENSION ASSOCIATED WITH DIABETES: Chronic | ICD-10-CM

## 2020-07-10 DIAGNOSIS — E11.69 HYPERLIPIDEMIA ASSOCIATED WITH TYPE 2 DIABETES MELLITUS: ICD-10-CM

## 2020-07-10 DIAGNOSIS — I34.0 NON-RHEUMATIC MITRAL REGURGITATION: ICD-10-CM

## 2020-07-10 DIAGNOSIS — I15.2 HYPERTENSION ASSOCIATED WITH DIABETES: Chronic | ICD-10-CM

## 2020-07-10 DIAGNOSIS — I65.23 BILATERAL CAROTID ARTERY STENOSIS: ICD-10-CM

## 2020-07-10 PROCEDURE — 1101F PT FALLS ASSESS-DOCD LE1/YR: CPT | Mod: HCNC,CPTII,NTX,S$GLB | Performed by: INTERNAL MEDICINE

## 2020-07-10 PROCEDURE — 99999 PR PBB SHADOW E&M-EST. PATIENT-LVL V: ICD-10-PCS | Mod: PBBFAC,HCNC,TXP, | Performed by: INTERNAL MEDICINE

## 2020-07-10 PROCEDURE — 2024F PR 7 FIELD PHOTOS WITH INTERP/ REVIEW: ICD-10-PCS | Mod: HCNC,NTX,S$GLB, | Performed by: INTERNAL MEDICINE

## 2020-07-10 PROCEDURE — 3051F PR MOST RECENT HEMOGLOBIN A1C LEVEL 7.0 - < 8.0%: ICD-10-PCS | Mod: HCNC,CPTII,NTX,S$GLB | Performed by: INTERNAL MEDICINE

## 2020-07-10 PROCEDURE — 1159F PR MEDICATION LIST DOCUMENTED IN MEDICAL RECORD: ICD-10-PCS | Mod: HCNC,NTX,S$GLB, | Performed by: INTERNAL MEDICINE

## 2020-07-10 PROCEDURE — 3077F SYST BP >= 140 MM HG: CPT | Mod: HCNC,CPTII,NTX,S$GLB | Performed by: INTERNAL MEDICINE

## 2020-07-10 PROCEDURE — 3008F BODY MASS INDEX DOCD: CPT | Mod: HCNC,CPTII,NTX,S$GLB | Performed by: INTERNAL MEDICINE

## 2020-07-10 PROCEDURE — 1159F MED LIST DOCD IN RCRD: CPT | Mod: HCNC,NTX,S$GLB, | Performed by: INTERNAL MEDICINE

## 2020-07-10 PROCEDURE — 99999 PR PBB SHADOW E&M-EST. PATIENT-LVL V: CPT | Mod: PBBFAC,HCNC,TXP, | Performed by: INTERNAL MEDICINE

## 2020-07-10 PROCEDURE — 3078F DIAST BP <80 MM HG: CPT | Mod: HCNC,CPTII,NTX,S$GLB | Performed by: INTERNAL MEDICINE

## 2020-07-10 PROCEDURE — 99215 OFFICE O/P EST HI 40 MIN: CPT | Mod: HCNC,NTX,S$GLB, | Performed by: INTERNAL MEDICINE

## 2020-07-10 PROCEDURE — 3077F PR MOST RECENT SYSTOLIC BLOOD PRESSURE >= 140 MM HG: ICD-10-PCS | Mod: HCNC,CPTII,NTX,S$GLB | Performed by: INTERNAL MEDICINE

## 2020-07-10 PROCEDURE — 2024F 7 FLD RTA PHOTO EVC RTNOPTHY: CPT | Mod: HCNC,NTX,S$GLB, | Performed by: INTERNAL MEDICINE

## 2020-07-10 PROCEDURE — 3051F HG A1C>EQUAL 7.0%<8.0%: CPT | Mod: HCNC,CPTII,NTX,S$GLB | Performed by: INTERNAL MEDICINE

## 2020-07-10 PROCEDURE — 99499 UNLISTED E&M SERVICE: CPT | Mod: HCNC,S$GLB,, | Performed by: INTERNAL MEDICINE

## 2020-07-10 PROCEDURE — 1101F PR PT FALLS ASSESS DOC 0-1 FALLS W/OUT INJ PAST YR: ICD-10-PCS | Mod: HCNC,CPTII,NTX,S$GLB | Performed by: INTERNAL MEDICINE

## 2020-07-10 PROCEDURE — 3078F PR MOST RECENT DIASTOLIC BLOOD PRESSURE < 80 MM HG: ICD-10-PCS | Mod: HCNC,CPTII,NTX,S$GLB | Performed by: INTERNAL MEDICINE

## 2020-07-10 PROCEDURE — 99215 PR OFFICE/OUTPT VISIT, EST, LEVL V, 40-54 MIN: ICD-10-PCS | Mod: HCNC,NTX,S$GLB, | Performed by: INTERNAL MEDICINE

## 2020-07-10 PROCEDURE — 3008F PR BODY MASS INDEX (BMI) DOCUMENTED: ICD-10-PCS | Mod: HCNC,CPTII,NTX,S$GLB | Performed by: INTERNAL MEDICINE

## 2020-07-10 PROCEDURE — 99499 RISK ADDL DX/OHS AUDIT: ICD-10-PCS | Mod: HCNC,S$GLB,, | Performed by: INTERNAL MEDICINE

## 2020-07-10 NOTE — PROGRESS NOTES
Subjective:   Patient ID:  Avril Monzon is a 75 y.o. female who presents for cardiac consult of Follow-up      HPI  The patient came in today for cardiac consult of Follow-up    Avril Monzon is a 75 y.o. female pt with HTN, hyperlipidemia, DM type II, former tobacco abuse, CKD, anemia, history of CVA, CAD s/p previous NSTEMI with PCI of LAD in 12/18 here for CV follow up.     Avril Monzon is a 75 y.o. female  admitted to ICU on NIV with a dx of acute on chronic systolic and diastolic CHF exacerbation , Volume overload , Uncontrolled HTN , Acute hypoxic respiratory failure  And CKD stage 5 . She was started on NIV , NTG drip and lasix drip with and improvement of her sx . She is (-) 2.3 lt since admission . She is wean off  BIPAP .   She cont on NTG drip and lasix drip . The BP has improved  . Her home BP medication were resume .   She has improved with IV diuresis, cardiology consulted for CHF. Discussed will need volume removal and will work up pulm HTN as outpt.    7/4/20 - changed to lasix 80 with BIdil, pt feels well ready for DC today. NO acute events overnight. Will follow up in CV clinic    7/20/20 - Hosp follow up  ECHO in hosp with elevated PA pressures, with mild to moderate TR/MR. She feels well, did not need oxygen at night. Swelling is stable/improved. No CP. Is active at home.     Patient feels  no chest pain, no PND, no palpitation, no dizziness, no syncope, no CNS symptoms.    Patient has fairly good exercise tolerance.    Patient is compliant with medications.    Results for orders placed during the hospital encounter of 07/01/20   Echo Color Flow Doppler? Yes    Narrative · Concentric left ventricular hypertrophy.  · Normal left ventricular systolic function. The estimated ejection   fraction is 60%.  · Grade II (moderate) left ventricular diastolic dysfunction consistent   with pseudonormalization.  · Normal right ventricular systolic function.  · Mild mitral regurgitation.  · Mild to  moderate tricuspid regurgitation.  · Mild pulmonic regurgitation.  · Moderate mitral prolapse of the anterior mitral leaflet.  · Normal central venous pressure (3 mmHg).  · The estimated PA systolic pressure is 68 mmHg.  · Pulmonary hypertension present.            Past Medical History:   Diagnosis Date    Acute hypoxemic respiratory failure 11/26/2018    Anemia     Arthritis     back, hand, knees    Back pain     Cataract     CKD stage G4/A3, GFR 15 - 29 and albumin creatinine ratio >300 mg/g     GFR 40% Jan 2014 and 33% ub 3/2014 ([facility])    Coronary artery disease involving native coronary artery of native heart 11/30/2018    Diabetic retinopathy     DM (diabetes mellitus) type II controlled, neurological manifestation     Exudative age-related macular degeneration of left eye with active choroidal neovascularization 2/5/2019    GERD (gastroesophageal reflux disease)     Glaucoma     Heart failure     Hyperlipidemia     Hypertension     Non-ST elevation MI (NSTEMI) 11/28/2018    NSTEMI (non-ST elevated myocardial infarction) 11/27/2018    Peripheral neuropathy     Polyneuropathy     Proteinuria     >6 mo     Seizures     [facility] 1/2014 -dick CT NRI showed small vessel    Stroke 2012,2014    Tobacco dependence     Type 2 diabetes with peripheral circulatory disorder, controlled        Past Surgical History:   Procedure Laterality Date    BREAST LUMPECTOMY Left     benign, when patient was 15 years old    BREAST MASS EXCISION      ,benign, age 15.    CATHETERIZATION OF BOTH LEFT AND RIGHT HEART N/A 11/28/2018    Procedure: CATHETERIZATION, HEART, BOTH LEFT AND RIGHT;  Surgeon: Michelle Holman MD;  Location: [facility] CATH LAB;  Service: Cardiology;  Laterality: N/A;    CATHETERIZATION OF BOTH LEFT AND RIGHT HEART N/A 11/30/2018    Procedure: CATHETERIZATION, HEART, BOTH LEFT AND RIGHT;  Surgeon: Michelle Holman MD;  Location: [facility] CATH LAB;  Service: Cardiology;  Laterality: N/A;    HYSTERECTOMY   1982    INSERTION OF SHUNT      LEFT HEART CATHETERIZATION Left 12/3/2018    Procedure: CATHETERIZATION, HEART, LEFT;  Surgeon: Michelle Holman MD;  Location: [facility] CATH LAB;  Service: Cardiology;  Laterality: Left;  LAD ATHERECTOMY STENT/ FEMORAL APPROACH    OOPHORECTOMY      wrist cyst Right 1980s    dorsal wrist cyst       Social History     Tobacco Use    Smoking status: Former Smoker     Packs/day: 1.50     Years: 30.00     Pack years: 45.00     Types: Cigarettes     Quit date: 1990     Years since quittin.5    Smokeless tobacco: Former User   Substance Use Topics    Alcohol use: No     Alcohol/week: 0.0 standard drinks    Drug use: No       Family History   Problem Relation Age of Onset    Diabetes Mother     Hyperlipidemia Mother     Hypertension Mother     Heart disease Mother         MI    Muscular dystrophy Son     Cancer Maternal Grandmother         colon       Patient's Medications   New Prescriptions    No medications on file   Previous Medications    ACCU-CHEK ARDEN PLUS METER MISC    USE TO TEST TWICE DAILY    AMLODIPINE (NORVASC) 5 MG TABLET    Take 5 mg by mouth once daily.    ASPIRIN (ECOTRIN) 81 MG EC TABLET    Take 1 tablet (81 mg total) by mouth once daily.    ATORVASTATIN (LIPITOR) 80 MG TABLET    Take 1 tablet (80 mg total) by mouth once daily.    BIDIL 20-37.5 MG TAB    TAKE 2 TABLETS BY MOUTH THREE TIMES DAILY    BLOOD SUGAR DIAGNOSTIC STRP    1 strip by Misc.(Non-Drug; Combo Route) route 3 (three) times daily. relion ultima    CALCITRIOL (ROCALTROL) 0.25 MCG CAP    Take 1 capsule by mouth once daily    CLOPIDOGREL (PLAVIX) 75 MG TABLET    TAKE 1 TABLET BY MOUTH ONCE DAILY    FLUTICASONE PROPIONATE (FLONASE) 50 MCG/ACTUATION NASAL SPRAY    1 spray (50 mcg total) by Each Nostril route once daily.    FUROSEMIDE (LASIX) 80 MG TABLET    Take 1 tablet (80 mg total) by mouth 2 (two) times daily.    GABAPENTIN (NEURONTIN) 300 MG CAPSULE    Take 1 capsule (300 mg total) by  mouth every evening.    INSULIN NPH-INSULIN REGULAR, 70/30, (NOVOLIN 70/30) 100 UNIT/ML (70-30) INJECTION    INJECT SUBCUTANEOUSLY 30 UNITS IN THE MORNING AND INJECT 25 UNITS IN THE EVENING    LANCETS (ACCU-CHEK SOFTCLIX LANCETS) MISC    1 lancet by Misc.(Non-Drug; Combo Route) route 3 (three) times daily.    LORATADINE (CLARITIN) 10 MG TABLET    Take 1 tablet (10 mg total) by mouth once daily.    METOPROLOL TARTRATE (LOPRESSOR) 25 MG TABLET    TAKE 1 TABLET BY MOUTH TWICE DAILY    NITROGLYCERIN (NITROSTAT) 0.4 MG SL TABLET    Place 1 tablet (0.4 mg total) under the tongue every 5 (five) minutes as needed for Chest pain.    ONDANSETRON (ZOFRAN-ODT) 4 MG TBDL    DISSOLVE 1 TABLET IN MOUTH THREE TIMES DAILY AS NEEDED FOR NAUSEA FOR 5 DAYS    SODIUM BICARBONATE 325 MG TABLET    TAKE 1 TABLET BY MOUTH THREE TIMES DAILY    TORSEMIDE (DEMADEX) 20 MG TAB    Take 2 tablets by mouth once daily   Modified Medications    No medications on file   Discontinued Medications    No medications on file       Review of Systems   Constitutional: Positive for malaise/fatigue.   HENT: Negative.    Eyes: Negative.    Respiratory: Positive for shortness of breath.    Cardiovascular: Positive for leg swelling.   Gastrointestinal: Negative.    Genitourinary: Negative.    Musculoskeletal: Negative.    Skin: Negative.    Neurological: Negative.    Endo/Heme/Allergies: Negative.    Psychiatric/Behavioral: Negative.    All 12 systems otherwise negative.      Wt Readings from Last 3 Encounters:   07/10/20 63.8 kg (140 lb 10.5 oz)   07/02/20 66.2 kg (146 lb)   07/01/20 66.3 kg (146 lb 2.6 oz)     Temp Readings from Last 3 Encounters:   07/04/20 98.5 °F (36.9 °C)   07/01/20 97.7 °F (36.5 °C)   07/01/20 97.7 °F (36.5 °C)     BP Readings from Last 3 Encounters:   07/10/20 (!) 148/60   07/04/20 125/60   07/01/20 (!) 196/71     Pulse Readings from Last 3 Encounters:   07/10/20 60   07/04/20 74   07/01/20 67       BP (!) 148/60 (BP Location: Right arm,  Patient Position: Sitting, BP Method: Medium (Manual))   Pulse 60   Wt 63.8 kg (140 lb 10.5 oz)   LMP  (LMP Unknown)   SpO2 99%   BMI 22.70 kg/m²     Objective:   Physical Exam   Constitutional: She is oriented to person, place, and time. She appears well-developed and well-nourished. No distress.   HENT:   Head: Normocephalic and atraumatic.   Nose: Nose normal.   Mouth/Throat: Oropharynx is clear and moist.   Eyes: Conjunctivae and EOM are normal. No scleral icterus.   Neck: Normal range of motion. Neck supple. No JVD present. No thyromegaly present.   Cardiovascular: Normal rate, regular rhythm, S1 normal and S2 normal. Exam reveals no gallop, no S3, no S4 and no friction rub.   Murmur heard.  Pulmonary/Chest: Effort normal and breath sounds normal. No stridor. No respiratory distress. She has no wheezes. She has no rales. She exhibits no tenderness.   Abdominal: Soft. Bowel sounds are normal. She exhibits no distension and no mass. There is no abdominal tenderness. There is no rebound.   Genitourinary:    Genitourinary Comments: Deferred     Musculoskeletal: Normal range of motion.         General: Edema present. No tenderness or deformity.   Lymphadenopathy:     She has no cervical adenopathy.   Neurological: She is alert and oriented to person, place, and time. She exhibits normal muscle tone. Coordination normal.   Skin: Skin is warm and dry. No rash noted. She is not diaphoretic. No erythema. No pallor.   Psychiatric: She has a normal mood and affect. Her behavior is normal. Judgment and thought content normal.   Nursing note and vitals reviewed.      Lab Results   Component Value Date     07/04/2020     07/04/2020    K 3.7 07/04/2020    K 3.7 07/04/2020     07/04/2020     07/04/2020    CO2 26 07/04/2020    CO2 26 07/04/2020    BUN 81 (H) 07/04/2020    BUN 81 (H) 07/04/2020    CREATININE 5.1 (H) 07/04/2020    CREATININE 5.1 (H) 07/04/2020     (H) 07/04/2020     (H)  07/04/2020    HGBA1C 7.9 (H) 07/02/2020    MG 2.0 07/02/2020    AST 16 07/04/2020    ALT 39 07/04/2020    ALBUMIN 2.7 (L) 07/04/2020    ALBUMIN 2.7 (L) 07/04/2020    PROT 5.9 (L) 07/04/2020    BILITOT 0.2 07/04/2020    WBC 5.30 07/04/2020    HGB 8.9 (L) 07/04/2020    HCT 28.3 (L) 07/04/2020    MCV 96 07/04/2020     07/04/2020    INR 0.9 06/15/2020    TSH 1.740 03/27/2019    CHOL 150 03/27/2019    HDL 45 03/27/2019    LDLCALC 90.0 03/27/2019    TRIG 75 03/27/2019     (H) 07/03/2020     Assessment:      1. Accelerated hypertension with diastolic congestive heart failure, NYHA class 3    2. History of CVA (cerebrovascular accident)    3. Coronary artery disease of native artery of native heart with stable angina pectoris    4. Non-rheumatic mitral regurgitation    5. Non-rheumatic tricuspid valve insufficiency    6. Hyperlipidemia associated with type 2 diabetes mellitus    7. Bilateral carotid artery stenosis    8. Hypertension associated with diabetes    9. Chronic kidney disease (CKD) stage G4/A3, severely decreased glomerular filtration rate (GFR) between 15-29 mL/min/1.73 square meter and albuminuria creatinine ratio greater than 300 mg/g    10. Stage 5 chronic kidney disease        Plan:   1. HFpEF with mild to mod MR/TR  - cont lasix 80 BID for now, discussed will need to reduce once dry but will need close monitoring  - monitor BP and weights  - low salt diet  - rec compression stockings    2. CAD s/p PCI  - cont asa, plavix, statin, BB  - stable    3. HTN  - cont meds and titrate    4. Carotid artery stenosis   - cont to monitor  - cont asa, statin    5. CKD4-5  - cont to monitor closely with renal  - HD when needed  - refer to nutrition services as well for CKD, CAD, HTN diet    6. H/o CVA  - cont asa, statin    A total of 45 minutes was spent in face to face time, of which 50 % was spent in counseling patient on disease process, complications, treatment, and side effects of medications.     The  patient was explained the above plan and given opportunity to ask questions.  She understands, chooses and consents to this plan and accepts all the risks, which include but are not limited to the risks mentioned above. She understands the alternative of having no testing, interventions or treatments at this time. She left content and without further questions.     Thank you for allowing me to participate in this patient's care. Please do not hesitate to contact me with any questions or concerns. Consult note has been forwarded to the referral physician.

## 2020-07-14 LAB
CHROM BANDING METHOD: NORMAL
CHROMOSOME ANALYSIS BM ADDITIONAL INFORMATION: NORMAL
CHROMOSOME ANALYSIS BM RELEASED BY: NORMAL
CHROMOSOME ANALYSIS BM RESULT SUMMARY: NORMAL
CLINICAL CYTOGENETICIST REVIEW: NORMAL
KARYOTYP MAR: NORMAL
REASON FOR REFERRAL (NARRATIVE): NORMAL
REF LAB TEST METHOD: NORMAL
SPECIMEN SOURCE: NORMAL
SPECIMEN: NORMAL

## 2020-07-15 ENCOUNTER — INFUSION (OUTPATIENT)
Dept: INFUSION THERAPY | Facility: HOSPITAL | Age: 75
End: 2020-07-15
Attending: INTERNAL MEDICINE
Payer: MEDICARE

## 2020-07-15 VITALS
DIASTOLIC BLOOD PRESSURE: 59 MMHG | TEMPERATURE: 98 F | RESPIRATION RATE: 18 BRPM | OXYGEN SATURATION: 99 % | SYSTOLIC BLOOD PRESSURE: 144 MMHG | HEART RATE: 61 BPM

## 2020-07-15 DIAGNOSIS — D50.0 IRON DEFICIENCY ANEMIA DUE TO CHRONIC BLOOD LOSS: ICD-10-CM

## 2020-07-15 DIAGNOSIS — D63.1 ANEMIA DUE TO STAGE 5 CHRONIC KIDNEY DISEASE: Primary | ICD-10-CM

## 2020-07-15 DIAGNOSIS — D63.1 ANEMIA IN STAGE 4 CHRONIC KIDNEY DISEASE: ICD-10-CM

## 2020-07-15 DIAGNOSIS — N18.4 CHRONIC KIDNEY DISEASE (CKD) STAGE G4/A3, SEVERELY DECREASED GLOMERULAR FILTRATION RATE (GFR) BETWEEN 15-29 ML/MIN/1.73 SQUARE METER AND ALBUMINURIA CREATININE RATIO GREATER THAN 300 MG/G: ICD-10-CM

## 2020-07-15 DIAGNOSIS — N18.5 STAGE 5 CHRONIC KIDNEY DISEASE: ICD-10-CM

## 2020-07-15 DIAGNOSIS — N18.5 ANEMIA DUE TO STAGE 5 CHRONIC KIDNEY DISEASE: Primary | ICD-10-CM

## 2020-07-15 DIAGNOSIS — N18.4 ANEMIA IN STAGE 4 CHRONIC KIDNEY DISEASE: ICD-10-CM

## 2020-07-15 PROCEDURE — 96372 THER/PROPH/DIAG INJ SC/IM: CPT | Mod: HCNC

## 2020-07-15 PROCEDURE — 63600175 PHARM REV CODE 636 W HCPCS: Mod: HCNC | Performed by: INTERNAL MEDICINE

## 2020-07-15 RX ADMIN — EPOETIN ALFA-EPBX 40000 UNITS: 40000 INJECTION, SOLUTION INTRAVENOUS; SUBCUTANEOUS at 01:07

## 2020-07-22 LAB — MAYO MISCELLANEOUS RESULT (REF): NORMAL

## 2020-07-26 ENCOUNTER — PATIENT OUTREACH (OUTPATIENT)
Dept: ADMINISTRATIVE | Facility: OTHER | Age: 75
End: 2020-07-26

## 2020-07-27 ENCOUNTER — NUTRITION (OUTPATIENT)
Dept: NUTRITION | Facility: CLINIC | Age: 75
End: 2020-07-27
Payer: MEDICARE

## 2020-07-27 VITALS — BODY MASS INDEX: 22.59 KG/M2 | WEIGHT: 135.56 LBS | HEIGHT: 65 IN

## 2020-07-27 DIAGNOSIS — Z71.3 DIETARY COUNSELING: Primary | ICD-10-CM

## 2020-07-27 DIAGNOSIS — N18.4 CHRONIC KIDNEY DISEASE (CKD) STAGE G4/A3, SEVERELY DECREASED GLOMERULAR FILTRATION RATE (GFR) BETWEEN 15-29 ML/MIN/1.73 SQUARE METER AND ALBUMINURIA CREATININE RATIO GREATER THAN 300 MG/G: ICD-10-CM

## 2020-07-27 DIAGNOSIS — N18.5 TYPE 2 DIABETES MELLITUS WITH STAGE 5 CHRONIC KIDNEY DISEASE NOT ON CHRONIC DIALYSIS, UNSPECIFIED WHETHER LONG TERM INSULIN USE: ICD-10-CM

## 2020-07-27 DIAGNOSIS — E11.22 TYPE 2 DIABETES MELLITUS WITH STAGE 5 CHRONIC KIDNEY DISEASE NOT ON CHRONIC DIALYSIS, UNSPECIFIED WHETHER LONG TERM INSULIN USE: ICD-10-CM

## 2020-07-27 DIAGNOSIS — I50.9 CHRONIC HEART FAILURE, UNSPECIFIED HEART FAILURE TYPE: ICD-10-CM

## 2020-07-27 DIAGNOSIS — I10 HYPERTENSION, UNSPECIFIED TYPE: ICD-10-CM

## 2020-07-27 LAB
COMMENT: NORMAL
FINAL PATHOLOGIC DIAGNOSIS: NORMAL
GROSS: NORMAL
Lab: NORMAL
MICROSCOPIC EXAM: NORMAL
SUPPLEMENTAL DIAGNOSIS: NORMAL

## 2020-07-27 PROCEDURE — 99999 PR PBB SHADOW E&M-EST. PATIENT-LVL II: CPT | Mod: PBBFAC,HCNC,TXP, | Performed by: DIETITIAN, REGISTERED

## 2020-07-27 PROCEDURE — 97802 PR MED NUTR THER, 1ST, INDIV, EA 15 MIN: ICD-10-PCS | Mod: HCNC,NTX,S$GLB, | Performed by: DIETITIAN, REGISTERED

## 2020-07-27 PROCEDURE — 99999 PR PBB SHADOW E&M-EST. PATIENT-LVL II: ICD-10-PCS | Mod: PBBFAC,HCNC,TXP, | Performed by: DIETITIAN, REGISTERED

## 2020-07-27 PROCEDURE — 97802 MEDICAL NUTRITION INDIV IN: CPT | Mod: HCNC,NTX,S$GLB, | Performed by: DIETITIAN, REGISTERED

## 2020-07-27 NOTE — PROGRESS NOTES
"Referring Physician:Demetrio Mejia MD     Reason for visit:CKD, HTN, CHF, DM T2   Initial Visit    :1945     Allergies Reviewed, used to be allergic to seafood.   Meds Reviewed    Anthropometrics  Weight:61.5 kg (135 lb 9.3 oz)  Height:5' 5" (1.651 m)  BMI:Body mass index is 22.56 kg/m².   IBW: 130  +/-10%    Meds:  Outpatient Medications Prior to Visit   Medication Sig Dispense Refill    ACCU-CHEK ARDEN PLUS METER [facility] USE TO TEST TWICE DAILY  0    amLODIPine (NORVASC) 5 MG tablet Take 5 mg by mouth once daily.      aspirin (ECOTRIN) 81 MG EC tablet Take 1 tablet (81 mg total) by mouth once daily.  0    atorvastatin (LIPITOR) 80 MG tablet Take 1 tablet (80 mg total) by mouth once daily. (Patient taking differently: Take 40 mg by mouth once daily. ) 30 tablet 11    BIDIL 20-37.5 mg Tab TAKE 2 TABLETS BY MOUTH THREE TIMES DAILY 180 tablet 11    blood sugar diagnostic Strp 1 strip by Misc.(Non-Drug; Combo Route) route 3 (three) times daily. relion ultima 100 strip 11    calcitRIOL (ROCALTROL) 0.25 MCG Cap Take 1 capsule by mouth once daily 30 capsule 9    clopidogrel (PLAVIX) 75 mg tablet TAKE 1 TABLET BY MOUTH ONCE DAILY 39 tablet 6    fluticasone propionate (FLONASE) 50 mcg/actuation nasal spray 1 spray (50 mcg total) by Each Nostril route once daily. 16 g 0    furosemide (LASIX) 80 MG tablet Take 1 tablet (80 mg total) by mouth 2 (two) times daily. 60 tablet 11    gabapentin (NEURONTIN) 300 MG capsule Take 1 capsule (300 mg total) by mouth every evening. 90 capsule 1    insulin NPH-insulin regular, 70/30, (NOVOLIN 70/30) 100 unit/mL (70-30) injection INJECT SUBCUTANEOUSLY 30 UNITS IN THE MORNING AND INJECT 25 UNITS IN THE EVENING (Patient taking differently: INJECT SUBCUTANEOUSLY 30 UNITS IN THE MORNING AND INJECT 25 UNITS IN THE EVENING) 2 vial 3    lancets (ACCU-CHEK SOFTCLIX LANCETS) Misc 1 lancet by Misc.(Non-Drug; Combo Route) route 3 (three) times daily. 100 each 11    loratadine " (CLARITIN) 10 mg tablet Take 1 tablet (10 mg total) by mouth once daily. 30 tablet 0    metoprolol tartrate (LOPRESSOR) 25 MG tablet TAKE 1 TABLET BY MOUTH TWICE DAILY 60 tablet 6    nitroGLYCERIN (NITROSTAT) 0.4 MG SL tablet Place 1 tablet (0.4 mg total) under the tongue every 5 (five) minutes as needed for Chest pain. 20 tablet 0    ondansetron (ZOFRAN-ODT) 4 MG TbDL DISSOLVE 1 TABLET IN MOUTH THREE TIMES DAILY AS NEEDED FOR NAUSEA FOR 5 DAYS  0    sodium bicarbonate 325 MG tablet TAKE 1 TABLET BY MOUTH THREE TIMES DAILY 90 tablet 11    torsemide (DEMADEX) 20 MG Tab Take 2 tablets by mouth once daily 180 tablet 2     Facility-Administered Medications Prior to Visit   Medication Dose Route Frequency Provider Last Rate Last Dose    aflibercept Soln 2 mg  2 mg Intravitreal 1 time in Clinic/HOD JADIEL Coello MD           Food/Drug Interactions Noted:    -Amlodipine: Grapefruit juice may increase bioavailably   -Atorvastatin: Depletes CoQ10 and Vit E, Grapefruit juice may increase bioavailibity  -Lasix: Deplete potassium,, magnesium,, calcium, zinc, pyroxidene, thiamin, ascorbic acid.     Vitamins/Supplements/Herbs: Vitamin B and E daily (per doctor recommendation), Was previously taking Centrum Silver    Labs:   Glucose 70 - 110 mg/dL 153High     Sodium 136 - 145 mmol/L 143    Potassium 3.5 - 5.1 mmol/L 3.7    Chloride 95 - 110 mmol/L 105    CO2 23 - 29 mmol/L 26    BUN, Bld 8 - 23 mg/dL 81High     Calcium 8.7 - 10.5 mg/dL 8.6Low     Creatinine 0.5 - 1.4 mg/dL 5.1High     Albumin 3.5 - 5.2 g/dL 2.7Low     Phosphorus 2.7 - 4.5 mg/dL 4.3    eGFR if African American >60 mL/min/1.73 m^2 9Abnormal     eGFR if non African American >60 mL/min/1.73 m^2 8Abnormal           RBC 4.00 - 5.40 M/uL 2.96Low     Hemoglobin 12.0 - 16.0 g/dL 8.9Low     Hematocrit 37.0 - 48.5 % 28.3Low     Mean Corpuscular Volume 82 - 98 fL 96    Mean Corpuscular Hemoglobin 27.0 - 31.0 pg 30.1    Mean Corpuscular Hemoglobin Conc 32.0 - 36.0  g/dL 31.4Low           Nutrition Prescription per NKF K/DOQI  Calorie Needs (25-30 kcal/kg for CKD, weight maintaince/mild gain):  Activity Factor:  1.2   - Maintenance: 8215-3393 kcal  Protein (via 0.6-0.8 g/kg): 38.28-51.04 g  Fluid (30 ml/kg): 40 oz (CHF) or per MD irby (1    Support System:  Good support sytem. Daughter and grandchildren help a good bit.     Diet Hx: One doctor (does not remember which) told patient that she could not eat any salt, potatoes, bananas, milk, beans. Unsure why she was given these restrictions.  Pt has diabetes, hypertension, CKD, chronic heart failure. Pt states she can barely eat anything anymore because the doctors are so restrictive. Patient and  do not drive. Pt tries hard to limit salt and buy low sodium products. Patient is allowed to drink 40 oz per day. Reports occasional right back pain. Patient reports she feels great and has a lot of energy.     Breakfast: Turkey [place] (low sodium)   Lunch: Crackers and Peanut butter (unsalted crackers, peanut butter)  Dinner: Fried Fish with Lettuce and Tomato Salad + Bread  Snack: A few Nilla Wafers    Fluid: Sprite Zero(12 oz), 2 bottles of water (17 oz, each)      Current activity level and/or physical limitations:  Not very active, walks around the house a lot. Occasionally will get on bike to exercise. Reports she does not sit still for very long.     Motivation to make changes/anticipated barriers and/or expected adherence:  Patient is in action stage of change. Anticipate high level of compliance.     Nutrition-Focus Physical Findings:  Well nourished, slightly thin. Reports frail nails. Nails have transverse ridging. No signs of edema.     Nutrition Diagnosis:     Intake of inappropriate types of fat related to CHF as evidenced by patient reports eating foods high in saturated fat like espinoza, fried foods, and red meat.  Impaired nutrient utilization related to CHF/CKD/DM T2 as evidenced by need for sodium and fluid  restricted diets/potential need for potassium/phosphorous containing diets.   Food and nutrition related knowledge deficit related to CKD/HTN/CHF/DM T2 as evidenced by patient stating it had been years since last nutrition visit.     Recommendations:   Personal Goals  1. Get on bike twice a week for exercise.  2. Reach out to nephrologist and ask for clarification on potassium and phosphorous restrictions  3. Incorporate more vegetables to diet.    Recs:  1. Patient to keep daily sodium intake between 1743-2444 mg.   2. Pt to follow fluid recommendation set by MD.  3. Pt to limit saturated and trans fat, focusing on unsaturated fat.  4. Pt to eat based on MyPlate, monitoring the portion sizes of her carbohydrates and protein.   5. Pt to maintain a healthy weight.       Strategies Implemented:  Educated patient on a sodium restricted diet. Discussed non salt ways to flavor food, how to minimize sodium from processed/can foods, and how to shop sodium free. Discussed the importance of following fluid restriction on CHF and CKD. Discussed MyPlate method of eating to aid in carbohydrate/protein portion control. Educated patient on how potassium and phosphorous do no always need to be altered in diet, only if lab values are not in usual limits.  Discussed the importance of balancing protein in CKD. Also talked about the importance of weight management and eating heart healthy unsaturated fats. Educated on relationship between multiple disease states and nutrition.   Pt expressed understanding of all topics discovered. Stated she would review handouts again with daughter.     Provided Patient with the following materials:  -[facility] Diabetes Guide  -[facility] Hypertension Handout  -[facility] Heart Failure Handout  -NIDD[facility] Eat Right for CKD- 5 steps + Phos/Potassium handouts     Consultation Time:60 minutes.  Communicated with referring healthcare provider:  Consult note available in pt's Epic chart per MD discretion  Follow  Up:PRN

## 2020-07-29 ENCOUNTER — LAB VISIT (OUTPATIENT)
Dept: LAB | Facility: HOSPITAL | Age: 75
End: 2020-07-29
Attending: INTERNAL MEDICINE
Payer: MEDICARE

## 2020-07-29 ENCOUNTER — OFFICE VISIT (OUTPATIENT)
Dept: HEMATOLOGY/ONCOLOGY | Facility: CLINIC | Age: 75
End: 2020-07-29
Payer: MEDICARE

## 2020-07-29 VITALS
WEIGHT: 134.06 LBS | OXYGEN SATURATION: 100 % | HEIGHT: 65 IN | TEMPERATURE: 98 F | BODY MASS INDEX: 22.34 KG/M2 | RESPIRATION RATE: 16 BRPM | SYSTOLIC BLOOD PRESSURE: 134 MMHG | HEART RATE: 62 BPM | DIASTOLIC BLOOD PRESSURE: 69 MMHG

## 2020-07-29 DIAGNOSIS — E11.42 DM TYPE 2 WITH DIABETIC PERIPHERAL NEUROPATHY: ICD-10-CM

## 2020-07-29 DIAGNOSIS — R73.9 HYPERGLYCEMIA: ICD-10-CM

## 2020-07-29 DIAGNOSIS — N18.5 STAGE 5 CHRONIC KIDNEY DISEASE: ICD-10-CM

## 2020-07-29 DIAGNOSIS — D63.1 ANEMIA DUE TO STAGE 5 CHRONIC KIDNEY DISEASE: Primary | ICD-10-CM

## 2020-07-29 DIAGNOSIS — D63.1 ANEMIA DUE TO STAGE 5 CHRONIC KIDNEY DISEASE: ICD-10-CM

## 2020-07-29 DIAGNOSIS — N18.5 ANEMIA DUE TO STAGE 5 CHRONIC KIDNEY DISEASE: Primary | ICD-10-CM

## 2020-07-29 DIAGNOSIS — N18.5 ANEMIA DUE TO STAGE 5 CHRONIC KIDNEY DISEASE: ICD-10-CM

## 2020-07-29 LAB
ALBUMIN SERPL BCP-MCNC: 3.8 G/DL (ref 3.5–5.2)
ALP SERPL-CCNC: 68 U/L (ref 55–135)
ALT SERPL W/O P-5'-P-CCNC: 12 U/L (ref 10–44)
ANION GAP SERPL CALC-SCNC: 18 MMOL/L (ref 8–16)
AST SERPL-CCNC: 11 U/L (ref 10–40)
BASOPHILS # BLD AUTO: 0.02 K/UL (ref 0–0.2)
BASOPHILS NFR BLD: 0.4 % (ref 0–1.9)
BILIRUB SERPL-MCNC: 0.3 MG/DL (ref 0.1–1)
BUN SERPL-MCNC: 123 MG/DL (ref 8–23)
CALCIUM SERPL-MCNC: 9.1 MG/DL (ref 8.7–10.5)
CHLORIDE SERPL-SCNC: 97 MMOL/L (ref 95–110)
CO2 SERPL-SCNC: 23 MMOL/L (ref 23–29)
CREAT SERPL-MCNC: 7.6 MG/DL (ref 0.5–1.4)
DIFFERENTIAL METHOD: ABNORMAL
EOSINOPHIL # BLD AUTO: 0.1 K/UL (ref 0–0.5)
EOSINOPHIL NFR BLD: 1.1 % (ref 0–8)
ERYTHROCYTE [DISTWIDTH] IN BLOOD BY AUTOMATED COUNT: 13.5 % (ref 11.5–14.5)
EST. GFR  (AFRICAN AMERICAN): 6 ML/MIN/1.73 M^2
EST. GFR  (NON AFRICAN AMERICAN): 5 ML/MIN/1.73 M^2
GLUCOSE SERPL-MCNC: 379 MG/DL (ref 70–110)
HCT VFR BLD AUTO: 34.2 % (ref 37–48.5)
HGB BLD-MCNC: 10.5 G/DL (ref 12–16)
IMM GRANULOCYTES # BLD AUTO: 0.01 K/UL (ref 0–0.04)
IMM GRANULOCYTES NFR BLD AUTO: 0.2 % (ref 0–0.5)
LYMPHOCYTES # BLD AUTO: 0.8 K/UL (ref 1–4.8)
LYMPHOCYTES NFR BLD: 18.2 % (ref 18–48)
MCH RBC QN AUTO: 29.7 PG (ref 27–31)
MCHC RBC AUTO-ENTMCNC: 30.7 G/DL (ref 32–36)
MCV RBC AUTO: 97 FL (ref 82–98)
MONOCYTES # BLD AUTO: 0.3 K/UL (ref 0.3–1)
MONOCYTES NFR BLD: 6.3 % (ref 4–15)
NEUTROPHILS # BLD AUTO: 3.4 K/UL (ref 1.8–7.7)
NEUTROPHILS NFR BLD: 74 % (ref 38–73)
NRBC BLD-RTO: 0 /100 WBC
PLATELET # BLD AUTO: 191 K/UL (ref 150–350)
PMV BLD AUTO: 9.4 FL (ref 9.2–12.9)
POTASSIUM SERPL-SCNC: 4.9 MMOL/L (ref 3.5–5.1)
PROT SERPL-MCNC: 6.9 G/DL (ref 6–8.4)
RBC # BLD AUTO: 3.54 M/UL (ref 4–5.4)
SODIUM SERPL-SCNC: 138 MMOL/L (ref 136–145)
WBC # BLD AUTO: 4.62 K/UL (ref 3.9–12.7)

## 2020-07-29 PROCEDURE — 3078F PR MOST RECENT DIASTOLIC BLOOD PRESSURE < 80 MM HG: ICD-10-PCS | Mod: HCNC,CPTII,S$GLB, | Performed by: INTERNAL MEDICINE

## 2020-07-29 PROCEDURE — 99499 RISK ADDL DX/OHS AUDIT: ICD-10-PCS | Mod: HCNC,S$GLB,, | Performed by: INTERNAL MEDICINE

## 2020-07-29 PROCEDURE — 1101F PR PT FALLS ASSESS DOC 0-1 FALLS W/OUT INJ PAST YR: ICD-10-PCS | Mod: HCNC,CPTII,S$GLB, | Performed by: INTERNAL MEDICINE

## 2020-07-29 PROCEDURE — 1159F MED LIST DOCD IN RCRD: CPT | Mod: HCNC,S$GLB,, | Performed by: INTERNAL MEDICINE

## 2020-07-29 PROCEDURE — 3051F PR MOST RECENT HEMOGLOBIN A1C LEVEL 7.0 - < 8.0%: ICD-10-PCS | Mod: HCNC,CPTII,S$GLB, | Performed by: INTERNAL MEDICINE

## 2020-07-29 PROCEDURE — 3075F PR MOST RECENT SYSTOLIC BLOOD PRESS GE 130-139MM HG: ICD-10-PCS | Mod: HCNC,CPTII,S$GLB, | Performed by: INTERNAL MEDICINE

## 2020-07-29 PROCEDURE — 1126F PR PAIN SEVERITY QUANTIFIED, NO PAIN PRESENT: ICD-10-PCS | Mod: HCNC,S$GLB,, | Performed by: INTERNAL MEDICINE

## 2020-07-29 PROCEDURE — 3051F HG A1C>EQUAL 7.0%<8.0%: CPT | Mod: HCNC,CPTII,S$GLB, | Performed by: INTERNAL MEDICINE

## 2020-07-29 PROCEDURE — 99213 PR OFFICE/OUTPT VISIT, EST, LEVL III, 20-29 MIN: ICD-10-PCS | Mod: HCNC,S$GLB,, | Performed by: INTERNAL MEDICINE

## 2020-07-29 PROCEDURE — 1126F AMNT PAIN NOTED NONE PRSNT: CPT | Mod: HCNC,S$GLB,, | Performed by: INTERNAL MEDICINE

## 2020-07-29 PROCEDURE — 99999 PR PBB SHADOW E&M-EST. PATIENT-LVL V: ICD-10-PCS | Mod: PBBFAC,HCNC,, | Performed by: INTERNAL MEDICINE

## 2020-07-29 PROCEDURE — 99499 UNLISTED E&M SERVICE: CPT | Mod: HCNC,S$GLB,, | Performed by: INTERNAL MEDICINE

## 2020-07-29 PROCEDURE — 80053 COMPREHEN METABOLIC PANEL: CPT | Mod: HCNC,TXP

## 2020-07-29 PROCEDURE — 3008F PR BODY MASS INDEX (BMI) DOCUMENTED: ICD-10-PCS | Mod: HCNC,CPTII,S$GLB, | Performed by: INTERNAL MEDICINE

## 2020-07-29 PROCEDURE — 1159F PR MEDICATION LIST DOCUMENTED IN MEDICAL RECORD: ICD-10-PCS | Mod: HCNC,S$GLB,, | Performed by: INTERNAL MEDICINE

## 2020-07-29 PROCEDURE — 85025 COMPLETE CBC W/AUTO DIFF WBC: CPT | Mod: HCNC,TXP

## 2020-07-29 PROCEDURE — 99999 PR PBB SHADOW E&M-EST. PATIENT-LVL V: CPT | Mod: PBBFAC,HCNC,, | Performed by: INTERNAL MEDICINE

## 2020-07-29 PROCEDURE — 36415 COLL VENOUS BLD VENIPUNCTURE: CPT | Mod: HCNC,NTX

## 2020-07-29 PROCEDURE — 99213 OFFICE O/P EST LOW 20 MIN: CPT | Mod: HCNC,S$GLB,, | Performed by: INTERNAL MEDICINE

## 2020-07-29 PROCEDURE — 3008F BODY MASS INDEX DOCD: CPT | Mod: HCNC,CPTII,S$GLB, | Performed by: INTERNAL MEDICINE

## 2020-07-29 PROCEDURE — 3078F DIAST BP <80 MM HG: CPT | Mod: HCNC,CPTII,S$GLB, | Performed by: INTERNAL MEDICINE

## 2020-07-29 PROCEDURE — 1101F PT FALLS ASSESS-DOCD LE1/YR: CPT | Mod: HCNC,CPTII,S$GLB, | Performed by: INTERNAL MEDICINE

## 2020-07-29 PROCEDURE — 3075F SYST BP GE 130 - 139MM HG: CPT | Mod: HCNC,CPTII,S$GLB, | Performed by: INTERNAL MEDICINE

## 2020-07-29 NOTE — PROGRESS NOTES
She had home    Subjective:      DATE OF VISIT: 7/29/2020   ?   ?   Patient ID:?Avril Monzon is a 75 y.o. female.?? MR#: [id number]   ?   PRIMARY PROVIDER: Dr. De La Cruz    CHIEF COMPLAINT:  Follow-up  ?   DIAGNOSIS:  Anemia of chronic kidney disease?????   ?   CURRENT TREATMENT:  DIANE 40,000 units every other week for hemoglobin less than 10    INTERVAL EVENTS    Ms. Monzon is doing well today.  Since our last visit she had hospitalization for eye infection which she required IV antibiotics but has subsequently cleared.  Vision stable and has follow-up with Ophthalmology.  Stable energy, good appetite, no evidence of bleeding.        Review of Systems    ?   A comprehensive 14-point review of systems was reviewed with patient and was negative other than as specified above.   ?     Objective:      Physical Exam      ?   Vitals:    07/29/20 1034   BP: 134/69   Pulse: 62   Resp: 16   Temp: 97.6 °F (36.4 °C)      ECOG:?0   General appearance: Generally well appearing, in no acute distress.   Head, eyes, ears, nose, and throat: moist mucous membranes.   Respiratory:  Normal work of breathing  Abdomen: nontender, nondistended.   Extremities: Warm, without edema.   Neurologic: Alert and oriented. Grossly normal strength, coordination, and gait.   Skin: No rashes, ecchymoses or petechial lesion.   Psychiatric:  Normal mood and affect.    Laboratory:  ?   Lab Visit on 07/29/2020   Component Date Value Ref Range Status    WBC 07/29/2020 4.62  3.90 - 12.70 K/uL Final    RBC 07/29/2020 3.54* 4.00 - 5.40 M/uL Final    Hemoglobin 07/29/2020 10.5* 12.0 - 16.0 g/dL Final    Hematocrit 07/29/2020 34.2* 37.0 - 48.5 % Final    Mean Corpuscular Volume 07/29/2020 97  82 - 98 fL Final    Mean Corpuscular Hemoglobin 07/29/2020 29.7  27.0 - 31.0 pg Final    Mean Corpuscular Hemoglobin Conc 07/29/2020 30.7* 32.0 - 36.0 g/dL Final    RDW 07/29/2020 13.5  11.5 - 14.5 % Final    Platelets 07/29/2020 191  150 - 350 K/uL Final    MPV  07/29/2020 9.4  9.2 - 12.9 fL Final    Immature Granulocytes 07/29/2020 0.2  0.0 - 0.5 % Final    Gran # (ANC) 07/29/2020 3.4  1.8 - 7.7 K/uL Final    Immature Grans (Abs) 07/29/2020 0.01  0.00 - 0.04 K/uL Final    Lymph # 07/29/2020 0.8* 1.0 - 4.8 K/uL Final    Mono # 07/29/2020 0.3  0.3 - 1.0 K/uL Final    Eos # 07/29/2020 0.1  0.0 - 0.5 K/uL Final    Baso # 07/29/2020 0.02  0.00 - 0.20 K/uL Final    nRBC 07/29/2020 0  0 /100 WBC Final    Gran% 07/29/2020 74.0* 38.0 - 73.0 % Final    Lymph% 07/29/2020 18.2  18.0 - 48.0 % Final    Mono% 07/29/2020 6.3  4.0 - 15.0 % Final    Eosinophil% 07/29/2020 1.1  0.0 - 8.0 % Final    Basophil% 07/29/2020 0.4  0.0 - 1.9 % Final    Differential Method 07/29/2020 Automated   Final    Sodium 07/29/2020 138  136 - 145 mmol/L Final    Potassium 07/29/2020 4.9  3.5 - 5.1 mmol/L Final    Chloride 07/29/2020 97  95 - 110 mmol/L Final    CO2 07/29/2020 23  23 - 29 mmol/L Final    Glucose 07/29/2020 379* 70 - 110 mg/dL Final    BUN, Bld 07/29/2020 123* 8 - 23 mg/dL Final    Creatinine 07/29/2020 7.6* 0.5 - 1.4 mg/dL Final    Calcium 07/29/2020 9.1  8.7 - 10.5 mg/dL Final    Total Protein 07/29/2020 6.9  6.0 - 8.4 g/dL Final    Albumin 07/29/2020 3.8  3.5 - 5.2 g/dL Final    Total Bilirubin 07/29/2020 0.3  0.1 - 1.0 mg/dL Final    Alkaline Phosphatase 07/29/2020 68  55 - 135 U/L Final    AST 07/29/2020 11  10 - 40 U/L Final    ALT 07/29/2020 12  10 - 44 U/L Final    Anion Gap 07/29/2020 18* 8 - 16 mmol/L Final    eGFR if African American 07/29/2020 6* >60 mL/min/1.73 m^2 Final    eGFR if non African American 07/29/2020 5* >60 mL/min/1.73 m^2 Final      ?   ?  Lab Results   Component Value Date    IRON 56 05/11/2020    TIBC 275 05/11/2020    FERRITIN 605 (H) 05/11/2020      20% saturation    Assessment/Plan:       1. Anemia due to stage 5 chronic kidney disease    2. DM type 2 with diabetic peripheral neuropathy    3. Hyperglycemia    4. Stage 5 chronic  kidney disease          Plan:     # anemia:  Attributed to anemia of chronic kidney disease replete iron as needed most recently within normal limits.  Bone marrow biopsy 06/2020 reviewed with her today and although some limited sample to best of our ability there is not appear to be evidence of acute leukemia/blast or notable dysplastic/neoplastic process.  She understands given limited sample cannot exclude any yearly neoplastic process and will need to closely monitor for any change in constitutional symptoms or CBC which may indicate repeat bone marrow biopsy.  She has responded well to DIANE, retacrit 40K [id number], over the last month improvement in hemoglobin 8.9-10.5, no reticarit indicated today and will follow-up in 1 month for re-evaluation.    # chronic kidney disease:  Progressive renal disease associated with type 2 diabetes and hypertension.  I discussed with her notable worsening in creatinine on today's labs and instructions to follow up with her nephrologist, expressed understanding and plan to do so.    # Hyperglycemia:  Discussed with her today notable worsening in hyperglycemia, she is asymptomatic at this time, instructed her to take insulin when she gets home and to recheck blood glucose.    # hypertension:  Well controlled on today's visit blood pressure 130s over 60s, continue to follow with primary care.      Follow-Up:   No retacrit today needed  RV in 4 wks with cbc prior and possible retacrit

## 2020-08-06 ENCOUNTER — PATIENT OUTREACH (OUTPATIENT)
Dept: ADMINISTRATIVE | Facility: OTHER | Age: 75
End: 2020-08-06

## 2020-08-06 NOTE — PROGRESS NOTES
Requested updates within Care Everywhere.  Patient's chart was reviewed for overdue BETH topics.  Immunizations reconciled.    Eye exam scheduled 8/7/20.

## 2020-08-07 ENCOUNTER — OFFICE VISIT (OUTPATIENT)
Dept: OPHTHALMOLOGY | Facility: CLINIC | Age: 75
End: 2020-08-07
Payer: MEDICARE

## 2020-08-07 DIAGNOSIS — H18.421 BAND KERATOPATHY OF RIGHT EYE: ICD-10-CM

## 2020-08-07 DIAGNOSIS — E11.36 DIABETIC CATARACT: ICD-10-CM

## 2020-08-07 DIAGNOSIS — H21.42: ICD-10-CM

## 2020-08-07 DIAGNOSIS — E11.3553 TYPE 2 DIABETES MELLITUS WITH STABLE PROLIFERATIVE RETINOPATHY OF BOTH EYES, WITH LONG-TERM CURRENT USE OF INSULIN: Primary | ICD-10-CM

## 2020-08-07 DIAGNOSIS — Z79.4 TYPE 2 DIABETES MELLITUS WITH STABLE PROLIFERATIVE RETINOPATHY OF BOTH EYES, WITH LONG-TERM CURRENT USE OF INSULIN: Primary | ICD-10-CM

## 2020-08-07 PROCEDURE — 92014 COMPRE OPH EXAM EST PT 1/>: CPT | Mod: HCNC,S$GLB,TXP, | Performed by: OPHTHALMOLOGY

## 2020-08-07 PROCEDURE — 76512 B SCAN: ICD-10-PCS | Mod: HCNC,RT,S$GLB,TXP | Performed by: OPHTHALMOLOGY

## 2020-08-07 PROCEDURE — 99999 PR PBB SHADOW E&M-EST. PATIENT-LVL III: CPT | Mod: PBBFAC,HCNC,TXP, | Performed by: OPHTHALMOLOGY

## 2020-08-07 PROCEDURE — 99999 PR PBB SHADOW E&M-EST. PATIENT-LVL III: ICD-10-PCS | Mod: PBBFAC,HCNC,TXP, | Performed by: OPHTHALMOLOGY

## 2020-08-07 PROCEDURE — 76512 OPH US DX B-SCAN: CPT | Mod: HCNC,RT,S$GLB,TXP | Performed by: OPHTHALMOLOGY

## 2020-08-07 PROCEDURE — 92235 FLUORESCEIN ANGIOGRAPHY - OU - BOTH EYES: ICD-10-PCS | Mod: HCNC,S$GLB,TXP, | Performed by: OPHTHALMOLOGY

## 2020-08-07 PROCEDURE — 92014 PR EYE EXAM, EST PATIENT,COMPREHESV: ICD-10-PCS | Mod: HCNC,S$GLB,TXP, | Performed by: OPHTHALMOLOGY

## 2020-08-07 PROCEDURE — 92235 FLUORESCEIN ANGRPH MLTIFRAME: CPT | Mod: HCNC,S$GLB,TXP, | Performed by: OPHTHALMOLOGY

## 2020-08-07 RX ORDER — ATROPINE SULFATE 10 MG/ML
1 SOLUTION/ DROPS OPHTHALMIC 3 TIMES DAILY
Qty: 5 ML | Refills: 1 | Status: SHIPPED | OUTPATIENT
Start: 2020-08-07 | End: 2021-12-06

## 2020-08-07 RX ORDER — BRIMONIDINE TARTRATE AND TIMOLOL MALEATE 2; 5 MG/ML; MG/ML
1 SOLUTION OPHTHALMIC 2 TIMES DAILY
Qty: 5 ML | Refills: 1 | Status: SHIPPED | OUTPATIENT
Start: 2020-08-07 | End: 2020-10-29

## 2020-08-07 NOTE — PROGRESS NOTES
===============================  Avril Monzon,  8/7/2020 today   75 y.o. female   Last visit [facility]: :7/12/2019   Last visit eye dept. Visit date not found  [facility]:  Uncorrected distance visual acuity was NLP in the right eye and 20/400 in the left eye.  Tonometry     Tonometry (Applanation, 2:36 PM)       Right Left    Pressure 33 22               Not recorded         Not recorded         Not recorded        Chief Complaint   Patient presents with    Diabetic Eye Exam     lost to follow up, my vision is not good        ________________  8/7/2020 today  HPI     Diabetic Eye Exam      Additional comments: lost to follow up, my vision is not good               Comments     Noncompliant with visits    1.) DM SINCE 1995  PDR  S/P LASER AND INJECTIONS OS(DR BOX AND AT [facility])  2.) NLP OD SINCE BIRTH  3.) DENSE CAT OD / 2+NS OS  4.) Asteroid Hyalosis  5.)iop 42 od 11/5/18  Avastin os 3/15/19  EYLEA OS # 3 7/12/19          Last edited by JOSE Alegre on 8/7/2020  2:06 PM. (History)      Problem List Items Addressed This Visit        Eye/Vision problems    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, with long-term current use of insulin - Primary    Relevant Orders    Flourescein Angiography - OU - Both Eyes (Completed)      Other Visit Diagnoses     Iris bombe of left eye        Relevant Orders    Flourescein Angiography - OU - Both Eyes (Completed)    Diabetic cataract        Band keratopathy of right eye        Relevant Orders    B Scan (Completed)          This pcuite os 2/19 pp/50>>XCF      .  After tx. 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

## 2020-08-26 ENCOUNTER — LAB VISIT (OUTPATIENT)
Dept: LAB | Facility: HOSPITAL | Age: 75
End: 2020-08-26
Attending: INTERNAL MEDICINE
Payer: MEDICARE

## 2020-08-26 ENCOUNTER — OFFICE VISIT (OUTPATIENT)
Dept: HEMATOLOGY/ONCOLOGY | Facility: CLINIC | Age: 75
End: 2020-08-26
Payer: MEDICARE

## 2020-08-26 ENCOUNTER — INFUSION (OUTPATIENT)
Dept: INFUSION THERAPY | Facility: HOSPITAL | Age: 75
End: 2020-08-26
Attending: INTERNAL MEDICINE
Payer: MEDICARE

## 2020-08-26 ENCOUNTER — PATIENT OUTREACH (OUTPATIENT)
Dept: ADMINISTRATIVE | Facility: HOSPITAL | Age: 75
End: 2020-08-26

## 2020-08-26 VITALS
WEIGHT: 137.13 LBS | BODY MASS INDEX: 22.85 KG/M2 | HEIGHT: 65 IN | TEMPERATURE: 98 F | OXYGEN SATURATION: 99 % | DIASTOLIC BLOOD PRESSURE: 78 MMHG | RESPIRATION RATE: 16 BRPM | HEART RATE: 79 BPM | SYSTOLIC BLOOD PRESSURE: 172 MMHG

## 2020-08-26 VITALS
TEMPERATURE: 98 F | OXYGEN SATURATION: 99 % | DIASTOLIC BLOOD PRESSURE: 69 MMHG | HEART RATE: 69 BPM | SYSTOLIC BLOOD PRESSURE: 176 MMHG | RESPIRATION RATE: 17 BRPM

## 2020-08-26 DIAGNOSIS — N18.5 ANEMIA DUE TO STAGE 5 CHRONIC KIDNEY DISEASE: Primary | ICD-10-CM

## 2020-08-26 DIAGNOSIS — D63.1 ANEMIA DUE TO STAGE 5 CHRONIC KIDNEY DISEASE: ICD-10-CM

## 2020-08-26 DIAGNOSIS — N18.4 CHRONIC KIDNEY DISEASE (CKD) STAGE G4/A3, SEVERELY DECREASED GLOMERULAR FILTRATION RATE (GFR) BETWEEN 15-29 ML/MIN/1.73 SQUARE METER AND ALBUMINURIA CREATININE RATIO GREATER THAN 300 MG/G: ICD-10-CM

## 2020-08-26 DIAGNOSIS — N18.5 ANEMIA DUE TO STAGE 5 CHRONIC KIDNEY DISEASE: ICD-10-CM

## 2020-08-26 DIAGNOSIS — D50.0 IRON DEFICIENCY ANEMIA DUE TO CHRONIC BLOOD LOSS: ICD-10-CM

## 2020-08-26 DIAGNOSIS — D63.1 ANEMIA DUE TO STAGE 5 CHRONIC KIDNEY DISEASE: Primary | ICD-10-CM

## 2020-08-26 DIAGNOSIS — N18.4 ANEMIA IN STAGE 4 CHRONIC KIDNEY DISEASE: ICD-10-CM

## 2020-08-26 DIAGNOSIS — I10 HYPERTENSION, UNSPECIFIED TYPE: ICD-10-CM

## 2020-08-26 DIAGNOSIS — D63.1 ANEMIA IN STAGE 4 CHRONIC KIDNEY DISEASE: ICD-10-CM

## 2020-08-26 DIAGNOSIS — N18.5 STAGE 5 CHRONIC KIDNEY DISEASE: ICD-10-CM

## 2020-08-26 LAB
ALBUMIN SERPL BCP-MCNC: 3.9 G/DL (ref 3.5–5.2)
ALP SERPL-CCNC: 55 U/L (ref 55–135)
ALT SERPL W/O P-5'-P-CCNC: 11 U/L (ref 10–44)
ANION GAP SERPL CALC-SCNC: 15 MMOL/L (ref 8–16)
AST SERPL-CCNC: 18 U/L (ref 10–40)
BASOPHILS # BLD AUTO: 0.02 K/UL (ref 0–0.2)
BASOPHILS NFR BLD: 0.5 % (ref 0–1.9)
BILIRUB SERPL-MCNC: 0.2 MG/DL (ref 0.1–1)
BUN SERPL-MCNC: 93 MG/DL (ref 8–23)
CALCIUM SERPL-MCNC: 9.1 MG/DL (ref 8.7–10.5)
CHLORIDE SERPL-SCNC: 112 MMOL/L (ref 95–110)
CO2 SERPL-SCNC: 19 MMOL/L (ref 23–29)
CREAT SERPL-MCNC: 6.1 MG/DL (ref 0.5–1.4)
DIFFERENTIAL METHOD: ABNORMAL
EOSINOPHIL # BLD AUTO: 0.1 K/UL (ref 0–0.5)
EOSINOPHIL NFR BLD: 1.2 % (ref 0–8)
ERYTHROCYTE [DISTWIDTH] IN BLOOD BY AUTOMATED COUNT: 13.4 % (ref 11.5–14.5)
EST. GFR  (AFRICAN AMERICAN): 7 ML/MIN/1.73 M^2
EST. GFR  (NON AFRICAN AMERICAN): 6 ML/MIN/1.73 M^2
GLUCOSE SERPL-MCNC: 97 MG/DL (ref 70–110)
HCT VFR BLD AUTO: 29.7 % (ref 37–48.5)
HGB BLD-MCNC: 9.2 G/DL (ref 12–16)
IMM GRANULOCYTES # BLD AUTO: 0.01 K/UL (ref 0–0.04)
IMM GRANULOCYTES NFR BLD AUTO: 0.2 % (ref 0–0.5)
LYMPHOCYTES # BLD AUTO: 1.5 K/UL (ref 1–4.8)
LYMPHOCYTES NFR BLD: 34.7 % (ref 18–48)
MCH RBC QN AUTO: 29.2 PG (ref 27–31)
MCHC RBC AUTO-ENTMCNC: 31 G/DL (ref 32–36)
MCV RBC AUTO: 94 FL (ref 82–98)
MONOCYTES # BLD AUTO: 0.4 K/UL (ref 0.3–1)
MONOCYTES NFR BLD: 8.9 % (ref 4–15)
NEUTROPHILS # BLD AUTO: 2.4 K/UL (ref 1.8–7.7)
NEUTROPHILS NFR BLD: 54.7 % (ref 38–73)
NRBC BLD-RTO: 0 /100 WBC
PLATELET # BLD AUTO: 183 K/UL (ref 150–350)
PMV BLD AUTO: 10.3 FL (ref 9.2–12.9)
POTASSIUM SERPL-SCNC: 4.6 MMOL/L (ref 3.5–5.1)
PROT SERPL-MCNC: 6.8 G/DL (ref 6–8.4)
RBC # BLD AUTO: 3.15 M/UL (ref 4–5.4)
SODIUM SERPL-SCNC: 146 MMOL/L (ref 136–145)
WBC # BLD AUTO: 4.29 K/UL (ref 3.9–12.7)

## 2020-08-26 PROCEDURE — 1101F PR PT FALLS ASSESS DOC 0-1 FALLS W/OUT INJ PAST YR: ICD-10-PCS | Mod: HCNC,CPTII,S$GLB, | Performed by: INTERNAL MEDICINE

## 2020-08-26 PROCEDURE — 99214 OFFICE O/P EST MOD 30 MIN: CPT | Mod: HCNC,S$GLB,, | Performed by: INTERNAL MEDICINE

## 2020-08-26 PROCEDURE — 3008F BODY MASS INDEX DOCD: CPT | Mod: HCNC,CPTII,S$GLB, | Performed by: INTERNAL MEDICINE

## 2020-08-26 PROCEDURE — 80053 COMPREHEN METABOLIC PANEL: CPT | Mod: HCNC

## 2020-08-26 PROCEDURE — 1159F PR MEDICATION LIST DOCUMENTED IN MEDICAL RECORD: ICD-10-PCS | Mod: HCNC,S$GLB,, | Performed by: INTERNAL MEDICINE

## 2020-08-26 PROCEDURE — 99214 PR OFFICE/OUTPT VISIT, EST, LEVL IV, 30-39 MIN: ICD-10-PCS | Mod: HCNC,S$GLB,, | Performed by: INTERNAL MEDICINE

## 2020-08-26 PROCEDURE — 63600175 PHARM REV CODE 636 W HCPCS: Mod: HCNC | Performed by: INTERNAL MEDICINE

## 2020-08-26 PROCEDURE — 36415 COLL VENOUS BLD VENIPUNCTURE: CPT | Mod: HCNC

## 2020-08-26 PROCEDURE — 3077F PR MOST RECENT SYSTOLIC BLOOD PRESSURE >= 140 MM HG: ICD-10-PCS | Mod: HCNC,CPTII,S$GLB, | Performed by: INTERNAL MEDICINE

## 2020-08-26 PROCEDURE — 99999 PR PBB SHADOW E&M-EST. PATIENT-LVL IV: ICD-10-PCS | Mod: PBBFAC,HCNC,, | Performed by: INTERNAL MEDICINE

## 2020-08-26 PROCEDURE — 1159F MED LIST DOCD IN RCRD: CPT | Mod: HCNC,S$GLB,, | Performed by: INTERNAL MEDICINE

## 2020-08-26 PROCEDURE — 99999 PR PBB SHADOW E&M-EST. PATIENT-LVL IV: CPT | Mod: PBBFAC,HCNC,, | Performed by: INTERNAL MEDICINE

## 2020-08-26 PROCEDURE — 99499 UNLISTED E&M SERVICE: CPT | Mod: HCNC,S$GLB,, | Performed by: INTERNAL MEDICINE

## 2020-08-26 PROCEDURE — 1101F PT FALLS ASSESS-DOCD LE1/YR: CPT | Mod: HCNC,CPTII,S$GLB, | Performed by: INTERNAL MEDICINE

## 2020-08-26 PROCEDURE — 96372 THER/PROPH/DIAG INJ SC/IM: CPT | Mod: HCNC

## 2020-08-26 PROCEDURE — 3078F PR MOST RECENT DIASTOLIC BLOOD PRESSURE < 80 MM HG: ICD-10-PCS | Mod: HCNC,CPTII,S$GLB, | Performed by: INTERNAL MEDICINE

## 2020-08-26 PROCEDURE — 85025 COMPLETE CBC W/AUTO DIFF WBC: CPT | Mod: HCNC

## 2020-08-26 PROCEDURE — 3008F PR BODY MASS INDEX (BMI) DOCUMENTED: ICD-10-PCS | Mod: HCNC,CPTII,S$GLB, | Performed by: INTERNAL MEDICINE

## 2020-08-26 PROCEDURE — 1126F AMNT PAIN NOTED NONE PRSNT: CPT | Mod: HCNC,S$GLB,, | Performed by: INTERNAL MEDICINE

## 2020-08-26 PROCEDURE — 99499 RISK ADDL DX/OHS AUDIT: ICD-10-PCS | Mod: HCNC,S$GLB,, | Performed by: INTERNAL MEDICINE

## 2020-08-26 PROCEDURE — 3078F DIAST BP <80 MM HG: CPT | Mod: HCNC,CPTII,S$GLB, | Performed by: INTERNAL MEDICINE

## 2020-08-26 PROCEDURE — 1126F PR PAIN SEVERITY QUANTIFIED, NO PAIN PRESENT: ICD-10-PCS | Mod: HCNC,S$GLB,, | Performed by: INTERNAL MEDICINE

## 2020-08-26 PROCEDURE — 3077F SYST BP >= 140 MM HG: CPT | Mod: HCNC,CPTII,S$GLB, | Performed by: INTERNAL MEDICINE

## 2020-08-26 RX ADMIN — EPOETIN ALFA-EPBX 40000 UNITS: 40000 INJECTION, SOLUTION INTRAVENOUS; SUBCUTANEOUS at 11:08

## 2020-08-26 NOTE — NURSING
"Notified Dr. De La Cruz of patients elevated BP. She states, "Okay with her to get retacrit today. We discussed she needs to take bp meds when she gets home,if she is symptomatic or does not get lower at home recommend she does to ED." Nurse verbalized understanding.       "

## 2020-08-26 NOTE — DISCHARGE INSTRUCTIONS
[place] [facility] [place]  [address] [address] [address]  [address]  [phone] phone     [phone] fax  Hours of Operation: Monday- Friday 8:00am- 5:00pm  After hours phone  [phone]  Hematology / Oncology Physicians on call      JESSENIA Ortiz Dr., Dr., Dr., Dr., NP Cheree Bodden, NP Sydney Prescott, NP      Please call with any concerns regarding your appointment today.

## 2020-08-26 NOTE — PROGRESS NOTES
I have contacted patient to schedule over due mammogram and annual pcp visit. Patients requested a call back at another time.

## 2020-08-26 NOTE — PROGRESS NOTES
She had home    Subjective:      DATE OF VISIT: 8/26/2020   ?   ?   Patient ID:?Avril Monzon is a 75 y.o. female.?? MR#: [id number]   ?   PRIMARY PROVIDER: Dr. De La Cruz    CHIEF COMPLAINT:  Follow-up  ?   DIAGNOSIS:  Anemia of chronic kidney disease?????   ?   CURRENT TREATMENT:  DIANE 40,000 units every other week for hemoglobin less than 10    INTERVAL EVENTS    Ms. Monzon is doing well today.  Poorly controlled hypertension but she notes not taking blood pressure medication yet this morning.  She denies any headache or other symptoms.  She notes blood pressure is very well at home but often times better controlled.    Review of Systems    ?   A comprehensive 14-point review of systems was reviewed with patient and was negative other than as specified above.   ?     Objective:      Physical Exam      ?   Vitals:    08/26/20 1039   BP: (!) 172/78   Pulse: 79   Resp: 16   Temp: 98.3 °F (36.8 °C)      ECOG:?0   General appearance: Generally well appearing, in no acute distress.   Head, eyes, ears, nose, and throat: moist mucous membranes.   Respiratory:  Normal work of breathing  Abdomen: nontender, nondistended.   Extremities: Warm, without edema.   Neurologic: Alert and oriented. Grossly normal strength, coordination, and gait.   Skin: No rashes, ecchymoses or petechial lesion.   Psychiatric:  Normal mood and affect.    Laboratory:  ?   Lab Visit on 08/26/2020   Component Date Value Ref Range Status    WBC 08/26/2020 4.29  3.90 - 12.70 K/uL Final    RBC 08/26/2020 3.15* 4.00 - 5.40 M/uL Final    Hemoglobin 08/26/2020 9.2* 12.0 - 16.0 g/dL Final    Hematocrit 08/26/2020 29.7* 37.0 - 48.5 % Final    Mean Corpuscular Volume 08/26/2020 94  82 - 98 fL Final    Mean Corpuscular Hemoglobin 08/26/2020 29.2  27.0 - 31.0 pg Final    Mean Corpuscular Hemoglobin Conc 08/26/2020 31.0* 32.0 - 36.0 g/dL Final    RDW 08/26/2020 13.4  11.5 - 14.5 % Final    Platelets 08/26/2020 183  150 - 350 K/uL Final    MPV 08/26/2020 10.3   9.2 - 12.9 fL Final    Immature Granulocytes 08/26/2020 0.2  0.0 - 0.5 % Final    Gran # (ANC) 08/26/2020 2.4  1.8 - 7.7 K/uL Final    Immature Grans (Abs) 08/26/2020 0.01  0.00 - 0.04 K/uL Final    Lymph # 08/26/2020 1.5  1.0 - 4.8 K/uL Final    Mono # 08/26/2020 0.4  0.3 - 1.0 K/uL Final    Eos # 08/26/2020 0.1  0.0 - 0.5 K/uL Final    Baso # 08/26/2020 0.02  0.00 - 0.20 K/uL Final    nRBC 08/26/2020 0  0 /100 WBC Final    Gran% 08/26/2020 54.7  38.0 - 73.0 % Final    Lymph% 08/26/2020 34.7  18.0 - 48.0 % Final    Mono% 08/26/2020 8.9  4.0 - 15.0 % Final    Eosinophil% 08/26/2020 1.2  0.0 - 8.0 % Final    Basophil% 08/26/2020 0.5  0.0 - 1.9 % Final    Differential Method 08/26/2020 Automated   Final    Sodium 08/26/2020 146* 136 - 145 mmol/L Final    Potassium 08/26/2020 4.6  3.5 - 5.1 mmol/L Final    Chloride 08/26/2020 112* 95 - 110 mmol/L Final    CO2 08/26/2020 19* 23 - 29 mmol/L Final    Glucose 08/26/2020 97  70 - 110 mg/dL Final    BUN, Bld 08/26/2020 93* 8 - 23 mg/dL Final    Creatinine 08/26/2020 6.1* 0.5 - 1.4 mg/dL Final    Calcium 08/26/2020 9.1  8.7 - 10.5 mg/dL Final    Total Protein 08/26/2020 6.8  6.0 - 8.4 g/dL Final    Albumin 08/26/2020 3.9  3.5 - 5.2 g/dL Final    Total Bilirubin 08/26/2020 0.2  0.1 - 1.0 mg/dL Final    Alkaline Phosphatase 08/26/2020 55  55 - 135 U/L Final    AST 08/26/2020 18  10 - 40 U/L Final    ALT 08/26/2020 11  10 - 44 U/L Final    Anion Gap 08/26/2020 15  8 - 16 mmol/L Final    eGFR if African American 08/26/2020 7* >60 mL/min/1.73 m^2 Final    eGFR if non African American 08/26/2020 6* >60 mL/min/1.73 m^2 Final      ?   ?  Lab Results   Component Value Date    IRON 56 05/11/2020    TIBC 275 05/11/2020    FERRITIN 605 (H) 05/11/2020      20% saturation    Assessment/Plan:       1. Anemia due to stage 5 chronic kidney disease    2. Hypertension, unspecified type          Plan:     # anemia:  Attributed to anemia of chronic kidney disease  replete iron as needed most recently within normal limits.  Bone marrow biopsy 06/2020 limited sample to best of our ability there is not appear to be evidence of acute leukemia/blast or notable dysplastic/neoplastic process.  She understands given limited sample cannot exclude any yearly neoplastic process and will need to closely monitor for any change in constitutional symptoms or CBC which may indicate repeat bone marrow biopsy.  She has responded well to DIANE, retacrit 40K [id number]   - hemoglobin less than 10;  will treat today q.2 weeks.   - repeat iron indices pending    # chronic kidney disease:  Progressive renal disease associated with type 2 diabetes and hypertension.  I discussed recommendation for close follow up with her nephrologist.      # hypertension:  Variable control of blood pressure during her visits with us.  She notes not taking blood pressure meds medications before clinic visit today likely explaining systolic 170 but does note better control at home.  Reinforced importance of taking blood pressure medications as prescribed and follow with primary care or urgency room persistent elevation or symptoms develop.      Follow-Up:   Retacrit today and q.2 weeks  Iron/TIBC and ferritin added on  Revisit in 1 month with CBC, CMP and possible retic crit

## 2020-08-28 ENCOUNTER — OFFICE VISIT (OUTPATIENT)
Dept: CARDIOLOGY | Facility: CLINIC | Age: 75
End: 2020-08-28
Payer: MEDICARE

## 2020-08-28 VITALS
SYSTOLIC BLOOD PRESSURE: 157 MMHG | OXYGEN SATURATION: 99 % | BODY MASS INDEX: 22.42 KG/M2 | DIASTOLIC BLOOD PRESSURE: 68 MMHG | WEIGHT: 134.69 LBS | HEART RATE: 77 BPM

## 2020-08-28 DIAGNOSIS — I11.0 ACCELERATED HYPERTENSION WITH DIASTOLIC CONGESTIVE HEART FAILURE, NYHA CLASS 3: ICD-10-CM

## 2020-08-28 DIAGNOSIS — I25.118 CORONARY ARTERY DISEASE OF NATIVE ARTERY OF NATIVE HEART WITH STABLE ANGINA PECTORIS: Primary | ICD-10-CM

## 2020-08-28 DIAGNOSIS — E78.5 HYPERLIPIDEMIA ASSOCIATED WITH TYPE 2 DIABETES MELLITUS: ICD-10-CM

## 2020-08-28 DIAGNOSIS — R00.2 PALPITATIONS: ICD-10-CM

## 2020-08-28 DIAGNOSIS — Z86.73 HISTORY OF CVA (CEREBROVASCULAR ACCIDENT): ICD-10-CM

## 2020-08-28 DIAGNOSIS — E11.59 HYPERTENSION ASSOCIATED WITH DIABETES: Chronic | ICD-10-CM

## 2020-08-28 DIAGNOSIS — E11.69 HYPERLIPIDEMIA ASSOCIATED WITH TYPE 2 DIABETES MELLITUS: ICD-10-CM

## 2020-08-28 DIAGNOSIS — I36.1 NON-RHEUMATIC TRICUSPID VALVE INSUFFICIENCY: ICD-10-CM

## 2020-08-28 DIAGNOSIS — R07.9 CHEST PAIN, UNSPECIFIED TYPE: Primary | ICD-10-CM

## 2020-08-28 DIAGNOSIS — I15.2 HYPERTENSION ASSOCIATED WITH DIABETES: Chronic | ICD-10-CM

## 2020-08-28 DIAGNOSIS — I27.20 PULMONARY HYPERTENSION: ICD-10-CM

## 2020-08-28 DIAGNOSIS — I34.0 NON-RHEUMATIC MITRAL REGURGITATION: ICD-10-CM

## 2020-08-28 DIAGNOSIS — I50.30 ACCELERATED HYPERTENSION WITH DIASTOLIC CONGESTIVE HEART FAILURE, NYHA CLASS 3: ICD-10-CM

## 2020-08-28 DIAGNOSIS — N18.4 CHRONIC KIDNEY DISEASE (CKD) STAGE G4/A3, SEVERELY DECREASED GLOMERULAR FILTRATION RATE (GFR) BETWEEN 15-29 ML/MIN/1.73 SQUARE METER AND ALBUMINURIA CREATININE RATIO GREATER THAN 300 MG/G: ICD-10-CM

## 2020-08-28 DIAGNOSIS — I65.23 BILATERAL CAROTID ARTERY STENOSIS: ICD-10-CM

## 2020-08-28 PROCEDURE — 3078F PR MOST RECENT DIASTOLIC BLOOD PRESSURE < 80 MM HG: ICD-10-PCS | Mod: HCNC,CPTII,S$GLB, | Performed by: INTERNAL MEDICINE

## 2020-08-28 PROCEDURE — 3051F PR MOST RECENT HEMOGLOBIN A1C LEVEL 7.0 - < 8.0%: ICD-10-PCS | Mod: HCNC,CPTII,S$GLB, | Performed by: INTERNAL MEDICINE

## 2020-08-28 PROCEDURE — 3077F SYST BP >= 140 MM HG: CPT | Mod: HCNC,CPTII,S$GLB, | Performed by: INTERNAL MEDICINE

## 2020-08-28 PROCEDURE — 99999 PR PBB SHADOW E&M-EST. PATIENT-LVL IV: ICD-10-PCS | Mod: PBBFAC,HCNC,, | Performed by: INTERNAL MEDICINE

## 2020-08-28 PROCEDURE — 3008F BODY MASS INDEX DOCD: CPT | Mod: HCNC,CPTII,S$GLB, | Performed by: INTERNAL MEDICINE

## 2020-08-28 PROCEDURE — 3008F PR BODY MASS INDEX (BMI) DOCUMENTED: ICD-10-PCS | Mod: HCNC,CPTII,S$GLB, | Performed by: INTERNAL MEDICINE

## 2020-08-28 PROCEDURE — 1101F PR PT FALLS ASSESS DOC 0-1 FALLS W/OUT INJ PAST YR: ICD-10-PCS | Mod: HCNC,CPTII,S$GLB, | Performed by: INTERNAL MEDICINE

## 2020-08-28 PROCEDURE — 99499 UNLISTED E&M SERVICE: CPT | Mod: HCNC,S$GLB,, | Performed by: INTERNAL MEDICINE

## 2020-08-28 PROCEDURE — 1101F PT FALLS ASSESS-DOCD LE1/YR: CPT | Mod: HCNC,CPTII,S$GLB, | Performed by: INTERNAL MEDICINE

## 2020-08-28 PROCEDURE — 99499 RISK ADDL DX/OHS AUDIT: ICD-10-PCS | Mod: HCNC,S$GLB,, | Performed by: INTERNAL MEDICINE

## 2020-08-28 PROCEDURE — 1159F MED LIST DOCD IN RCRD: CPT | Mod: HCNC,S$GLB,, | Performed by: INTERNAL MEDICINE

## 2020-08-28 PROCEDURE — 99214 PR OFFICE/OUTPT VISIT, EST, LEVL IV, 30-39 MIN: ICD-10-PCS | Mod: HCNC,S$GLB,, | Performed by: INTERNAL MEDICINE

## 2020-08-28 PROCEDURE — 1159F PR MEDICATION LIST DOCUMENTED IN MEDICAL RECORD: ICD-10-PCS | Mod: HCNC,S$GLB,, | Performed by: INTERNAL MEDICINE

## 2020-08-28 PROCEDURE — 3078F DIAST BP <80 MM HG: CPT | Mod: HCNC,CPTII,S$GLB, | Performed by: INTERNAL MEDICINE

## 2020-08-28 PROCEDURE — 99214 OFFICE O/P EST MOD 30 MIN: CPT | Mod: HCNC,S$GLB,, | Performed by: INTERNAL MEDICINE

## 2020-08-28 PROCEDURE — 99999 PR PBB SHADOW E&M-EST. PATIENT-LVL IV: CPT | Mod: PBBFAC,HCNC,, | Performed by: INTERNAL MEDICINE

## 2020-08-28 PROCEDURE — 3077F PR MOST RECENT SYSTOLIC BLOOD PRESSURE >= 140 MM HG: ICD-10-PCS | Mod: HCNC,CPTII,S$GLB, | Performed by: INTERNAL MEDICINE

## 2020-08-28 PROCEDURE — 3051F HG A1C>EQUAL 7.0%<8.0%: CPT | Mod: HCNC,CPTII,S$GLB, | Performed by: INTERNAL MEDICINE

## 2020-08-28 NOTE — PROGRESS NOTES
Subjective:   Patient ID:  Avril Monzon is a 75 y.o. female who presents for cardiac consult of Palpitations      Palpitations   Associated symptoms include chest pain, malaise/fatigue and shortness of breath.   Chest Pain   Associated symptoms include malaise/fatigue, palpitations and shortness of breath.     The patient came in today for cardiac consult of Palpitations    Avril Monzon is a 75 y.o. female pt with HTN, hyperlipidemia, DM type II, former tobacco abuse, CKD, anemia, history of CVA, CAD s/p previous NSTEMI with PCI of LAD in 12/18 here for CV follow up.     Avril Monzon is a 75 y.o. female  admitted to ICU on NIV with a dx of acute on chronic systolic and diastolic CHF exacerbation , Volume overload , Uncontrolled HTN , Acute hypoxic respiratory failure  And CKD stage 5 . She was started on NIV , NTG drip and lasix drip with and improvement of her sx . She is (-) 2.3 lt since admission . She is wean off  BIPAP .   She cont on NTG drip and lasix drip . The BP has improved  . Her home BP medication were resume .   She has improved with IV diuresis, cardiology consulted for CHF. Discussed will need volume removal and will work up pulm HTN as outpt.    7/4/20 - changed to lasix 80 with BIdil, pt feels well ready for DC today. NO acute events overnight. Will follow up in CV clinic    7/20/20 - Hosp follow up  ECHO in hosp with elevated PA pressures, with mild to moderate TR/MR. She feels well, did not need oxygen at night. Swelling is stable/improved. No CP. Is active at home.     8/28/20  She has been feeling better but has been having more palpitations x 1 week. Stable dyspnea no chest pain. She feels more tired at times. BP stable at home, is on Torsemide now.    Patient feels  no chest pain, no PND, no dizziness, no syncope, no CNS symptoms.    Patient has fairly good exercise tolerance.    Patient is compliant with medications.    Results for orders placed during the hospital encounter of  07/01/20   Echo Color Flow Doppler? Yes    Narrative · Concentric left ventricular hypertrophy.  · Normal left ventricular systolic function. The estimated ejection   fraction is 60%.  · Grade II (moderate) left ventricular diastolic dysfunction consistent   with pseudonormalization.  · Normal right ventricular systolic function.  · Mild mitral regurgitation.  · Mild to moderate tricuspid regurgitation.  · Mild pulmonic regurgitation.  · Moderate mitral prolapse of the anterior mitral leaflet.  · Normal central venous pressure (3 mmHg).  · The estimated PA systolic pressure is 68 mmHg.  · Pulmonary hypertension present.            Past Medical History:   Diagnosis Date    Acute hypoxemic respiratory failure 11/26/2018    Anemia     Arthritis     back, hand, knees    Back pain     Cataract     CKD stage G4/A3, GFR 15 - 29 and albumin creatinine ratio >300 mg/g     GFR 40% Jan 2014 and 33% ub 3/2014 ([facility])    Coronary artery disease involving native coronary artery of native heart 11/30/2018    Diabetic retinopathy     DM (diabetes mellitus) type II controlled, neurological manifestation     Exudative age-related macular degeneration of left eye with active choroidal neovascularization 2/5/2019    GERD (gastroesophageal reflux disease)     Glaucoma     Heart failure     Hyperlipidemia     Hypertension     Non-ST elevation MI (NSTEMI) 11/28/2018    NSTEMI (non-ST elevated myocardial infarction) 11/27/2018    Peripheral neuropathy     Polyneuropathy     Proteinuria     >6 mo     Seizures     [facility] 1/2014 -dick CT NRI showed small vessel    Stroke 2012,2014    Tobacco dependence     Type 2 diabetes with peripheral circulatory disorder, controlled        Past Surgical History:   Procedure Laterality Date    BREAST LUMPECTOMY Left     benign, when patient was 15 years old    BREAST MASS EXCISION      ,benign, age 15.    CATHETERIZATION OF BOTH LEFT AND RIGHT HEART N/A 11/28/2018    Procedure:  CATHETERIZATION, HEART, BOTH LEFT AND RIGHT;  Surgeon: Michelle Holman MD;  Location: [facility] CATH LAB;  Service: Cardiology;  Laterality: N/A;    CATHETERIZATION OF BOTH LEFT AND RIGHT HEART N/A 2018    Procedure: CATHETERIZATION, HEART, BOTH LEFT AND RIGHT;  Surgeon: Michelle Holman MD;  Location: [facility] CATH LAB;  Service: Cardiology;  Laterality: N/A;    HYSTERECTOMY  1982    INSERTION OF SHUNT      LEFT HEART CATHETERIZATION Left 12/3/2018    Procedure: CATHETERIZATION, HEART, LEFT;  Surgeon: Michelle Holman MD;  Location: [facility] CATH LAB;  Service: Cardiology;  Laterality: Left;  LAD ATHERECTOMY STENT/ FEMORAL APPROACH    OOPHORECTOMY      wrist cyst Right 1980s    dorsal wrist cyst       Social History     Tobacco Use    Smoking status: Former Smoker     Packs/day: 1.50     Years: 30.00     Pack years: 45.00     Types: Cigarettes     Quit date: 1990     Years since quittin.6    Smokeless tobacco: Former User   Substance Use Topics    Alcohol use: No     Alcohol/week: 0.0 standard drinks    Drug use: No       Family History   Problem Relation Age of Onset    Diabetes Mother     Hyperlipidemia Mother     Hypertension Mother     Heart disease Mother         MI    Muscular dystrophy Son     Cancer Maternal Grandmother         colon       Patient's Medications   New Prescriptions    No medications on file   Previous Medications    ACCU-CHEK ARDEN PLUS METER MISC    USE TO TEST TWICE DAILY    AMLODIPINE (NORVASC) 5 MG TABLET    Take 5 mg by mouth once daily.    ASPIRIN (ECOTRIN) 81 MG EC TABLET    Take 1 tablet (81 mg total) by mouth once daily.    ATORVASTATIN (LIPITOR) 80 MG TABLET    Take 1 tablet (80 mg total) by mouth once daily.    ATROPINE 1% (ISOPTO ATROPINE) 1 % DROP    Place 1 drop into the left eye 3 (three) times daily.    BIDIL 20-37.5 MG TAB    TAKE 2 TABLETS BY MOUTH THREE TIMES DAILY    BLOOD SUGAR DIAGNOSTIC STRP    1 strip by Misc.(Non-Drug; Combo Route) route 3 (three)  times daily. relion ultima    BRIMONIDINE-TIMOLOL (COMBIGAN) 0.2-0.5 % DROP    Place 1 drop into the left eye 2 (two) times a day.    CALCITRIOL (ROCALTROL) 0.25 MCG CAP    Take 1 capsule by mouth once daily    CLOPIDOGREL (PLAVIX) 75 MG TABLET    TAKE 1 TABLET BY MOUTH ONCE DAILY    FLUTICASONE PROPIONATE (FLONASE) 50 MCG/ACTUATION NASAL SPRAY    1 spray (50 mcg total) by Each Nostril route once daily.    FUROSEMIDE (LASIX) 80 MG TABLET    Take 1 tablet (80 mg total) by mouth 2 (two) times daily.    GABAPENTIN (NEURONTIN) 300 MG CAPSULE    Take 1 capsule (300 mg total) by mouth every evening.    INSULIN NPH-INSULIN REGULAR, 70/30, (NOVOLIN 70/30) 100 UNIT/ML (70-30) INJECTION    INJECT SUBCUTANEOUSLY 30 UNITS IN THE MORNING AND INJECT 25 UNITS IN THE EVENING    LANCETS (ACCU-CHEK SOFTCLIX LANCETS) MISC    1 lancet by Misc.(Non-Drug; Combo Route) route 3 (three) times daily.    LORATADINE (CLARITIN) 10 MG TABLET    Take 1 tablet (10 mg total) by mouth once daily.    METOPROLOL TARTRATE (LOPRESSOR) 25 MG TABLET    TAKE 1 TABLET BY MOUTH TWICE DAILY    NITROGLYCERIN (NITROSTAT) 0.4 MG SL TABLET    Place 1 tablet (0.4 mg total) under the tongue every 5 (five) minutes as needed for Chest pain.    ONDANSETRON (ZOFRAN-ODT) 4 MG TBDL    DISSOLVE 1 TABLET IN MOUTH THREE TIMES DAILY AS NEEDED FOR NAUSEA FOR 5 DAYS    SODIUM BICARBONATE 325 MG TABLET    TAKE 1 TABLET BY MOUTH THREE TIMES DAILY    TORSEMIDE (DEMADEX) 20 MG TAB    Take 2 tablets by mouth once daily   Modified Medications    No medications on file   Discontinued Medications    No medications on file       Review of Systems   Constitutional: Positive for malaise/fatigue.   HENT: Negative.    Eyes: Negative.    Respiratory: Positive for shortness of breath.    Cardiovascular: Positive for chest pain, palpitations and leg swelling.   Gastrointestinal: Negative.    Genitourinary: Negative.    Musculoskeletal: Negative.    Skin: Negative.    Neurological: Negative.     Endo/Heme/Allergies: Negative.    Psychiatric/Behavioral: Negative.    All 12 systems otherwise negative.      Wt Readings from Last 3 Encounters:   08/28/20 61.1 kg (134 lb 11.2 oz)   08/26/20 62.2 kg (137 lb 2 oz)   07/29/20 60.8 kg (134 lb 0.6 oz)     Temp Readings from Last 3 Encounters:   08/26/20 98.1 °F (36.7 °C)   08/26/20 98.3 °F (36.8 °C)   07/29/20 97.6 °F (36.4 °C)     BP Readings from Last 3 Encounters:   08/28/20 (!) 157/68   08/26/20 (!) 176/69   08/26/20 (!) 172/78     Pulse Readings from Last 3 Encounters:   08/28/20 77   08/26/20 69   08/26/20 79       BP (!) 157/68 (BP Location: Right arm, Patient Position: Sitting, BP Method: Medium (Manual))   Pulse 77   Wt 61.1 kg (134 lb 11.2 oz)   LMP  (LMP Unknown)   SpO2 99%   BMI 22.42 kg/m²     Objective:   Physical Exam   Constitutional: She is oriented to person, place, and time. She appears well-developed and well-nourished. No distress.   HENT:   Head: Normocephalic and atraumatic.   Nose: Nose normal.   Mouth/Throat: Oropharynx is clear and moist.   Eyes: Conjunctivae and EOM are normal. No scleral icterus.   Neck: Normal range of motion. Neck supple. No JVD present. No thyromegaly present.   Cardiovascular: Normal rate, regular rhythm, S1 normal and S2 normal. Exam reveals no gallop, no S3, no S4 and no friction rub.   Murmur heard.  Pulmonary/Chest: Effort normal and breath sounds normal. No stridor. No respiratory distress. She has no wheezes. She has no rales. She exhibits no tenderness.   Abdominal: Soft. Bowel sounds are normal. She exhibits no distension and no mass. There is no abdominal tenderness. There is no rebound.   Genitourinary:    Genitourinary Comments: Deferred     Musculoskeletal: Normal range of motion.         General: Edema present. No tenderness or deformity.   Lymphadenopathy:     She has no cervical adenopathy.   Neurological: She is alert and oriented to person, place, and time. She exhibits normal muscle tone.  Coordination normal.   Skin: Skin is warm and dry. No rash noted. She is not diaphoretic. No erythema. No pallor.   Psychiatric: She has a normal mood and affect. Her behavior is normal. Judgment and thought content normal.   Nursing note and vitals reviewed.      Lab Results   Component Value Date     (H) 08/26/2020    K 4.6 08/26/2020     (H) 08/26/2020    CO2 19 (L) 08/26/2020    BUN 93 (H) 08/26/2020    CREATININE 6.1 (H) 08/26/2020    GLU 97 08/26/2020    HGBA1C 7.9 (H) 07/02/2020    MG 2.0 07/02/2020    AST 18 08/26/2020    ALT 11 08/26/2020    ALBUMIN 3.9 08/26/2020    PROT 6.8 08/26/2020    BILITOT 0.2 08/26/2020    WBC 4.29 08/26/2020    HGB 9.2 (L) 08/26/2020    HCT 29.7 (L) 08/26/2020    MCV 94 08/26/2020     08/26/2020    INR 0.9 06/15/2020    TSH 1.740 03/27/2019    CHOL 150 03/27/2019    HDL 45 03/27/2019    LDLCALC 90.0 03/27/2019    TRIG 75 03/27/2019     (H) 07/03/2020     Assessment:      1. Coronary artery disease of native artery of native heart with stable angina pectoris    2. Accelerated hypertension with diastolic congestive heart failure, NYHA class 3    3. Non-rheumatic mitral regurgitation    4. Non-rheumatic tricuspid valve insufficiency    5. Hyperlipidemia associated with type 2 diabetes mellitus    6. Bilateral carotid artery stenosis    7. Pulmonary hypertension    8. Hypertension associated with diabetes    9. Chronic kidney disease (CKD) stage G4/A3, severely decreased glomerular filtration rate (GFR) between 15-29 mL/min/1.73 square meter and albuminuria creatinine ratio greater than 300 mg/g    10. History of CVA (cerebrovascular accident)        Plan:   1. HFpEF with mild to mod MR/TR  - cont torsemide  - monitor BP and weights  - low salt diet  - rec compression stockings    2. CAD s/p PCI  - cont asa, plavix, statin, BB  - stable    3. HTN  - cont meds and titrate    4. Carotid artery stenosis   - cont to monitor  - cont asa, statin    5. CKD4-5  -  cont to monitor closely with renal  - HD when needed  - refer to nutrition services as well for CKD, CAD, HTN diet    6. H/o CVA  - cont asa, statin    7. Palpitations  - Holter ordered  - cont BB  - dec caffine       Thank you for allowing me to participate in this patient's care. Please do not hesitate to contact me with any questions or concerns. Consult note has been forwarded to the referral physician.

## 2020-08-31 ENCOUNTER — HOSPITAL ENCOUNTER (OUTPATIENT)
Dept: CARDIOLOGY | Facility: HOSPITAL | Age: 75
Discharge: HOME | End: 2020-08-31
Attending: INTERNAL MEDICINE
Payer: MEDICARE

## 2020-08-31 DIAGNOSIS — R00.2 PALPITATIONS: ICD-10-CM

## 2020-08-31 PROCEDURE — 93226 XTRNL ECG REC<48 HR SCAN A/R: CPT | Mod: HCNC

## 2020-08-31 PROCEDURE — 93227 XTRNL ECG REC<48 HR R&I: CPT | Mod: HCNC,,, | Performed by: INTERNAL MEDICINE

## 2020-08-31 PROCEDURE — 93227 HOLTER MONITOR - 48 HOUR (CUPID ONLY): ICD-10-PCS | Mod: HCNC,,, | Performed by: INTERNAL MEDICINE

## 2020-09-02 LAB
OHS CV EVENT MONITOR DAY: 2
OHS CV HOLTER LENGTH DECIMAL HOURS: 96
OHS CV HOLTER LENGTH HOURS: 48
OHS CV HOLTER LENGTH MINUTES: 0

## 2020-09-03 RX ORDER — METOPROLOL TARTRATE 50 MG/1
50 TABLET ORAL 2 TIMES DAILY
Qty: 60 TABLET | Refills: 3 | Status: SHIPPED | OUTPATIENT
Start: 2020-09-03 | End: 2020-11-17

## 2020-09-04 ENCOUNTER — TELEPHONE (OUTPATIENT)
Dept: CARDIOLOGY | Facility: CLINIC | Age: 75
End: 2020-09-04

## 2020-09-04 NOTE — TELEPHONE ENCOUNTER
Spoke with pt and explained need to double medication per provider and monitor BP/HR. Pt verbalized understanding but stated that she cannot see and spilled some pills behind her dresser which she cannot retrieve and has not been taking whichever pill spilled for over a week now. Unsure if the pills that were spilled are the medication she needs to double but she is requesting to have the metoprolol refilled just incase it is.

## 2020-09-04 NOTE — TELEPHONE ENCOUNTER
----- Message from Demetrio Mejia MD sent at 9/3/2020  2:39 PM CDT -----  Please call the patient regarding her abnormal result. Frequent PACs - extra beats noted, double metoprolol to 50mg twice a day and monitor BP and HR and follow up in clinic as scheduled.

## 2020-09-09 ENCOUNTER — INFUSION (OUTPATIENT)
Dept: INFUSION THERAPY | Facility: HOSPITAL | Age: 75
End: 2020-09-09
Attending: INTERNAL MEDICINE
Payer: MEDICARE

## 2020-09-09 VITALS
RESPIRATION RATE: 18 BRPM | TEMPERATURE: 98 F | HEART RATE: 65 BPM | SYSTOLIC BLOOD PRESSURE: 131 MMHG | OXYGEN SATURATION: 99 % | DIASTOLIC BLOOD PRESSURE: 52 MMHG

## 2020-09-09 DIAGNOSIS — N18.5 STAGE 5 CHRONIC KIDNEY DISEASE: ICD-10-CM

## 2020-09-09 DIAGNOSIS — D50.0 IRON DEFICIENCY ANEMIA DUE TO CHRONIC BLOOD LOSS: ICD-10-CM

## 2020-09-09 DIAGNOSIS — D63.1 ANEMIA DUE TO STAGE 5 CHRONIC KIDNEY DISEASE: Primary | ICD-10-CM

## 2020-09-09 DIAGNOSIS — N18.4 CHRONIC KIDNEY DISEASE (CKD) STAGE G4/A3, SEVERELY DECREASED GLOMERULAR FILTRATION RATE (GFR) BETWEEN 15-29 ML/MIN/1.73 SQUARE METER AND ALBUMINURIA CREATININE RATIO GREATER THAN 300 MG/G: ICD-10-CM

## 2020-09-09 DIAGNOSIS — N18.4 ANEMIA IN STAGE 4 CHRONIC KIDNEY DISEASE: ICD-10-CM

## 2020-09-09 DIAGNOSIS — N18.5 ANEMIA DUE TO STAGE 5 CHRONIC KIDNEY DISEASE: Primary | ICD-10-CM

## 2020-09-09 DIAGNOSIS — D63.1 ANEMIA IN STAGE 4 CHRONIC KIDNEY DISEASE: ICD-10-CM

## 2020-09-09 PROCEDURE — 63600175 PHARM REV CODE 636 W HCPCS: Mod: HCNC | Performed by: INTERNAL MEDICINE

## 2020-09-09 PROCEDURE — 96372 THER/PROPH/DIAG INJ SC/IM: CPT | Mod: HCNC

## 2020-09-09 RX ADMIN — EPOETIN ALFA-EPBX 40000 UNITS: 40000 INJECTION, SOLUTION INTRAVENOUS; SUBCUTANEOUS at 11:09

## 2020-09-23 ENCOUNTER — PATIENT OUTREACH (OUTPATIENT)
Dept: ADMINISTRATIVE | Facility: OTHER | Age: 75
End: 2020-09-23

## 2020-09-24 ENCOUNTER — OFFICE VISIT (OUTPATIENT)
Dept: PODIATRY | Facility: CLINIC | Age: 75
End: 2020-09-24
Payer: MEDICARE

## 2020-09-24 VITALS
SYSTOLIC BLOOD PRESSURE: 158 MMHG | HEART RATE: 67 BPM | HEIGHT: 65 IN | BODY MASS INDEX: 22.44 KG/M2 | DIASTOLIC BLOOD PRESSURE: 63 MMHG | WEIGHT: 134.69 LBS

## 2020-09-24 DIAGNOSIS — B35.1 DERMATOPHYTOSIS OF NAIL: ICD-10-CM

## 2020-09-24 DIAGNOSIS — E11.42 CONTROLLED TYPE 2 DIABETES MELLITUS WITH DIABETIC POLYNEUROPATHY, WITH LONG-TERM CURRENT USE OF INSULIN: Primary | ICD-10-CM

## 2020-09-24 DIAGNOSIS — Z79.4 CONTROLLED TYPE 2 DIABETES MELLITUS WITH DIABETIC POLYNEUROPATHY, WITH LONG-TERM CURRENT USE OF INSULIN: Primary | ICD-10-CM

## 2020-09-24 DIAGNOSIS — I77.1 ARTERIAL INSUFFICIENCY: ICD-10-CM

## 2020-09-24 PROCEDURE — 99499 UNLISTED E&M SERVICE: CPT | Mod: HCNC,S$GLB,, | Performed by: PODIATRIST

## 2020-09-24 PROCEDURE — 11720 DEBRIDE NAIL 1-5: CPT | Mod: 59,Q9,HCNC,S$GLB | Performed by: PODIATRIST

## 2020-09-24 PROCEDURE — 99999 PR PBB SHADOW E&M-EST. PATIENT-LVL IV: ICD-10-PCS | Mod: PBBFAC,HCNC,, | Performed by: PODIATRIST

## 2020-09-24 PROCEDURE — 99999 PR PBB SHADOW E&M-EST. PATIENT-LVL IV: CPT | Mod: PBBFAC,HCNC,, | Performed by: PODIATRIST

## 2020-09-24 PROCEDURE — 11720 PR DEBRIDEMENT OF NAIL(S), 1-5: ICD-10-PCS | Mod: 59,Q9,HCNC,S$GLB | Performed by: PODIATRIST

## 2020-09-24 PROCEDURE — 11719 PR TRIM NAIL(S): ICD-10-PCS | Mod: Q9,HCNC,S$GLB, | Performed by: PODIATRIST

## 2020-09-24 PROCEDURE — 11719 TRIM NAIL(S) ANY NUMBER: CPT | Mod: Q9,HCNC,S$GLB, | Performed by: PODIATRIST

## 2020-09-24 PROCEDURE — 99499 NO LOS: ICD-10-PCS | Mod: HCNC,S$GLB,, | Performed by: PODIATRIST

## 2020-09-24 NOTE — PROGRESS NOTES
[facility] -   PODIATRIC MEDICINE AND SURGERY  PROGRESS NOTE  9/24/2020    PODIATRY NOTE  PCP: Dr. Rose Germain MD, Last Visit 1/31/20    CHIEF COMPLAINT   Chief Complaint   Patient presents with    Routine Foot Care     PCP Dr. Germain/Zaheer 6/24/20 3 mo [facility]       HPI:    Avril Monzon is a 75 y.o. female who has a past medical history of Acute hypoxemic respiratory failure (11/26/2018), Anemia, Arthritis, Back pain, Cataract, CKD stage G4/A3, GFR 15 - 29 and albumin creatinine ratio >300 mg/g, Coronary artery disease involving native coronary artery of native heart (11/30/2018), Diabetic retinopathy, DM (diabetes mellitus) type II controlled, neurological manifestation, Exudative age-related macular degeneration of left eye with active choroidal neovascularization (2/5/2019), GERD (gastroesophageal reflux disease), Glaucoma, Heart failure, Hyperlipidemia, Hypertension, Non-ST elevation MI (NSTEMI) (11/28/2018), NSTEMI (non-ST elevated myocardial infarction) (11/27/2018), Peripheral neuropathy, Polyneuropathy, Proteinuria, Seizures, Stroke (2012,2014), Tobacco dependence, and Type 2 diabetes with peripheral circulatory disorder, controlled.     Avril presents to clinic today complaining of painful elongated toenails. She is legally blind and unable to trim her nails.  She requests at risk foot care due to thickened, painful, elongated toenails. Relief is obtained with routine debridements. Patient denies other pedal complaints at this time.     Hemoglobin A1C   Date Value Ref Range Status   07/02/2020 7.9 (H) 4.0 - 5.6 % Final     Comment:     ADA Screening Guidelines:  5.7-6.4%  Consistent with prediabetes  >or=6.5%  Consistent with diabetes  High levels of fetal hemoglobin interfere with the HbA1C  assay. Heterozygous hemoglobin variants (HbS, HgC, etc)do  not significantly interfere with this assay.   However, presence of multiple variants may affect accuracy.     12/09/2019 12.9 (H)  4.0 - 5.6 % Final     Comment:     ADA Screening Guidelines:  5.7-6.4%  Consistent with prediabetes  >or=6.5%  Consistent with diabetes  High levels of fetal hemoglobin interfere with the HbA1C  assay. Heterozygous hemoglobin variants (HbS, HgC, etc)do  not significantly interfere with this assay.   However, presence of multiple variants may affect accuracy.     03/27/2019 7.0 (H) 4.0 - 5.6 % Final     Comment:     ADA Screening Guidelines:  5.7-6.4%  Consistent with prediabetes  >or=6.5%  Consistent with diabetes  High levels of fetal hemoglobin interfere with the HbA1C  assay. Heterozygous hemoglobin variants (HbS, HgC, etc)do  not significantly interfere with this assay.   However, presence of multiple variants may affect accuracy.           PMH  Past Medical History:   Diagnosis Date    Acute hypoxemic respiratory failure 11/26/2018    Anemia     Arthritis     back, hand, knees    Back pain     Cataract     CKD stage G4/A3, GFR 15 - 29 and albumin creatinine ratio >300 mg/g     GFR 40% Jan 2014 and 33% ub 3/2014 ([facility])    Coronary artery disease involving native coronary artery of native heart 11/30/2018    Diabetic retinopathy     DM (diabetes mellitus) type II controlled, neurological manifestation     Exudative age-related macular degeneration of left eye with active choroidal neovascularization 2/5/2019    GERD (gastroesophageal reflux disease)     Glaucoma     Heart failure     Hyperlipidemia     Hypertension     Non-ST elevation MI (NSTEMI) 11/28/2018    NSTEMI (non-ST elevated myocardial infarction) 11/27/2018    Peripheral neuropathy     Polyneuropathy     Proteinuria     >6 mo     Seizures     BRG 1/2014 -dick CT NRI showed small vessel    Stroke 2012,2014    Tobacco dependence     Type 2 diabetes with peripheral circulatory disorder, controlled        PROBLEM LIST  Patient Active Problem List    Diagnosis Date Noted    DM type 2 with diabetic peripheral neuropathy 06/12/2020     Anemia due to stage 5 chronic kidney disease 05/07/2019    Age-related nuclear cataract of left eye 05/06/2019    Accelerated hypertension with diastolic congestive heart failure, NYHA class 3 04/05/2019    Non-rheumatic mitral regurgitation 04/05/2019    Non-rheumatic tricuspid valve insufficiency 04/05/2019    Other headache syndrome 03/26/2019    Exudative age-related macular degeneration of left eye with active choroidal neovascularization 02/05/2019    Stage 5 chronic kidney disease 11/30/2018    Coronary artery disease involving native coronary artery of native heart 11/30/2018    Pulmonary hypertension 11/28/2018    Hyperlipidemia associated with type 2 diabetes mellitus 06/08/2018    Iron deficiency anemia due to chronic blood loss 02/22/2018    Anemia in stage 4 chronic kidney disease 05/18/2017    History of CVA (cerebrovascular accident) 05/16/2017    Hyperparathyroidism 05/16/2017    Vitamin D deficiency 05/16/2017    DDD (degenerative disc disease), lumbar 05/16/2017    Bilateral carotid artery disease 11/11/2016    Aortic atherosclerosis 11/11/2016    Type 2 diabetes mellitus with circulatory disorder, with long-term current use of insulin 11/11/2016    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, with long-term current use of insulin 05/02/2016    Cataracta brunescens of right eye 05/02/2016    Chronic kidney disease (CKD) stage G4/A3, severely decreased glomerular filtration rate (GFR) between 15-29 mL/min/1.73 square meter and albuminuria creatinine ratio greater than 300 mg/g     Proteinuria     Constipation 02/18/2014    Hypertension associated with diabetes     Controlled type 2 diabetes mellitus with diabetic polyneuropathy, with long-term current use of insulin        MEDS  Current Outpatient Medications on File Prior to Visit   Medication Sig Dispense Refill    ACCU-CHEK ARDEN PLUS METER Mis USE TO TEST TWICE DAILY  0    amLODIPine  (NORVASC) 5 MG tablet Take 5 mg by mouth once daily.      atorvastatin (LIPITOR) 80 MG tablet Take 1 tablet (80 mg total) by mouth once daily. (Patient taking differently: Take 40 mg by mouth once daily. ) 30 tablet 11    atropine 1% (ISOPTO ATROPINE) 1 % Drop Place 1 drop into the left eye 3 (three) times daily. 5 mL 1    BIDIL 20-37.5 mg Tab TAKE 2 TABLETS BY MOUTH THREE TIMES DAILY 180 tablet 3    blood sugar diagnostic Strp 1 strip by Misc.(Non-Drug; Combo Route) route 3 (three) times daily. relion ultima 100 strip 11    brimonidine-timoloL (COMBIGAN) 0.2-0.5 % Drop Place 1 drop into the left eye 2 (two) times a day. 5 mL 1    calcitRIOL (ROCALTROL) 0.25 MCG Cap Take 1 capsule by mouth once daily 30 capsule 9    clopidogrel (PLAVIX) 75 mg tablet TAKE 1 TABLET BY MOUTH ONCE DAILY 39 tablet 6    fluticasone propionate (FLONASE) 50 mcg/actuation nasal spray 1 spray (50 mcg total) by Each Nostril route once daily. 16 g 0    furosemide (LASIX) 80 MG tablet Take 1 tablet (80 mg total) by mouth 2 (two) times daily. 60 tablet 11    insulin NPH-insulin regular, 70/30, (NOVOLIN 70/30) 100 unit/mL (70-30) injection INJECT SUBCUTANEOUSLY 30 UNITS IN THE MORNING AND INJECT 25 UNITS IN THE EVENING (Patient taking differently: INJECT SUBCUTANEOUSLY 30 UNITS IN THE MORNING AND INJECT 25 UNITS IN THE EVENING) 2 vial 3    lancets (ACCU-CHEK SOFTCLIX LANCETS) Misc 1 lancet by Misc.(Non-Drug; Combo Route) route 3 (three) times daily. 100 each 11    loratadine (CLARITIN) 10 mg tablet Take 1 tablet (10 mg total) by mouth once daily. 30 tablet 0    metoprolol tartrate (LOPRESSOR) 25 MG tablet Take 1 tablet by mouth twice daily 180 tablet 0    metoprolol tartrate (LOPRESSOR) 50 MG tablet Take 1 tablet (50 mg total) by mouth 2 (two) times daily. 60 tablet 3    nitroGLYCERIN (NITROSTAT) 0.4 MG SL tablet Place 1 tablet (0.4 mg total) under the tongue every 5 (five) minutes as needed for Chest pain. 20 tablet 0     ondansetron (ZOFRAN-ODT) 4 MG TbDL DISSOLVE 1 TABLET IN MOUTH THREE TIMES DAILY AS NEEDED FOR NAUSEA FOR 5 DAYS  0    sodium bicarbonate 325 MG tablet TAKE 1 TABLET BY MOUTH THREE TIMES DAILY 90 tablet 0    torsemide (DEMADEX) 20 MG Tab Take 2 tablets by mouth once daily 180 tablet 2    aspirin (ECOTRIN) 81 MG EC tablet Take 1 tablet (81 mg total) by mouth once daily.  0    gabapentin (NEURONTIN) 300 MG capsule Take 1 capsule (300 mg total) by mouth every evening. 90 capsule 1    [DISCONTINUED] sodium bicarbonate 325 MG tablet TAKE 1 TABLET BY MOUTH THREE TIMES DAILY 90 tablet 11     No current facility-administered medications on file prior to visit.        Medication List with Changes/Refills   Current Medications    ACCU-CHEK ARDEN PLUS METER MISC    USE TO TEST TWICE DAILY    AMLODIPINE (NORVASC) 5 MG TABLET    Take 5 mg by mouth once daily.    ASPIRIN (ECOTRIN) 81 MG EC TABLET    Take 1 tablet (81 mg total) by mouth once daily.    ATORVASTATIN (LIPITOR) 80 MG TABLET    Take 1 tablet (80 mg total) by mouth once daily.    ATROPINE 1% (ISOPTO ATROPINE) 1 % DROP    Place 1 drop into the left eye 3 (three) times daily.    BIDIL 20-37.5 MG TAB    TAKE 2 TABLETS BY MOUTH THREE TIMES DAILY    BLOOD SUGAR DIAGNOSTIC STRP    1 strip by Misc.(Non-Drug; Combo Route) route 3 (three) times daily. relion ultima    BRIMONIDINE-TIMOLOL (COMBIGAN) 0.2-0.5 % DROP    Place 1 drop into the left eye 2 (two) times a day.    CALCITRIOL (ROCALTROL) 0.25 MCG CAP    Take 1 capsule by mouth once daily    CLOPIDOGREL (PLAVIX) 75 MG TABLET    TAKE 1 TABLET BY MOUTH ONCE DAILY    FLUTICASONE PROPIONATE (FLONASE) 50 MCG/ACTUATION NASAL SPRAY    1 spray (50 mcg total) by Each Nostril route once daily.    FUROSEMIDE (LASIX) 80 MG TABLET    Take 1 tablet (80 mg total) by mouth 2 (two) times daily.    GABAPENTIN (NEURONTIN) 300 MG CAPSULE    Take 1 capsule (300 mg total) by mouth every evening.    INSULIN NPH-INSULIN REGULAR, 70/30, (NOVOLIN  70/30) 100 UNIT/ML (70-30) INJECTION    INJECT SUBCUTANEOUSLY 30 UNITS IN THE MORNING AND INJECT 25 UNITS IN THE EVENING    LANCETS (ACCU-CHEK SOFTCLIX LANCETS) MISC    1 lancet by Misc.(Non-Drug; Combo Route) route 3 (three) times daily.    LORATADINE (CLARITIN) 10 MG TABLET    Take 1 tablet (10 mg total) by mouth once daily.    METOPROLOL TARTRATE (LOPRESSOR) 25 MG TABLET    Take 1 tablet by mouth twice daily    METOPROLOL TARTRATE (LOPRESSOR) 50 MG TABLET    Take 1 tablet (50 mg total) by mouth 2 (two) times daily.    NITROGLYCERIN (NITROSTAT) 0.4 MG SL TABLET    Place 1 tablet (0.4 mg total) under the tongue every 5 (five) minutes as needed for Chest pain.    ONDANSETRON (ZOFRAN-ODT) 4 MG TBDL    DISSOLVE 1 TABLET IN MOUTH THREE TIMES DAILY AS NEEDED FOR NAUSEA FOR 5 DAYS    SODIUM BICARBONATE 325 MG TABLET    TAKE 1 TABLET BY MOUTH THREE TIMES DAILY    TORSEMIDE (DEMADEX) 20 MG TAB    Take 2 tablets by mouth once daily       PSH     Past Surgical History:   Procedure Laterality Date    BREAST LUMPECTOMY Left     benign, when patient was 15 years old    BREAST MASS EXCISION      ,benign, age 15.    CATHETERIZATION OF BOTH LEFT AND RIGHT HEART N/A 11/28/2018    Procedure: CATHETERIZATION, HEART, BOTH LEFT AND RIGHT;  Surgeon: Michelle Holman MD;  Location: [facility] CATH LAB;  Service: Cardiology;  Laterality: N/A;    CATHETERIZATION OF BOTH LEFT AND RIGHT HEART N/A 11/30/2018    Procedure: CATHETERIZATION, HEART, BOTH LEFT AND RIGHT;  Surgeon: Michelle Holman MD;  Location: [facility] CATH LAB;  Service: Cardiology;  Laterality: N/A;    HYSTERECTOMY  1982    INSERTION OF SHUNT      LEFT HEART CATHETERIZATION Left 12/3/2018    Procedure: CATHETERIZATION, HEART, LEFT;  Surgeon: Michelle Holman MD;  Location: [facility] CATH LAB;  Service: Cardiology;  Laterality: Left;  LAD ATHERECTOMY STENT/ FEMORAL APPROACH    OOPHORECTOMY      wrist cyst Right 1980s    dorsal wrist cyst        ALL  Review of patient's allergies  "indicates:   Allergen Reactions    Codeine Swelling    Darvon [propoxyphene] Swelling       SOC     Social History     Tobacco Use    Smoking status: Former Smoker     Packs/day: 1.50     Years: 30.00     Pack years: 45.00     Types: Cigarettes     Quit date: 1990     Years since quittin.7    Smokeless tobacco: Former User   Substance Use Topics    Alcohol use: No     Alcohol/week: 0.0 standard drinks    Drug use: No         FAMILY HX    Family History   Problem Relation Age of Onset    Diabetes Mother     Hyperlipidemia Mother     Hypertension Mother     Heart disease Mother         MI    Muscular dystrophy Son     Cancer Maternal Grandmother         colon            REVIEW OF SYSTEMS   General: This patient is well-developed, well-nourished and appears stated age, well-oriented to person, place and time, and cooperative and pleasant on today's visit  Constitutional: Negative for chills and fever.   Respiratory: Negative for shortness of breath.    Cardiovascular: Negative for chest pain, palpitations, orthopnea  Gastrointestinal: Negative for diarrhea, nausea and vomiting.   Musculoskeletal: Positive for above noted in HPI  Skin: positive for skin and nail changes  Neurological: positive for tingling and sensory changes  Peripheral Vascular: no claudication or cyanosis  Psychiatric/Behavioral: Negative for altered mental status     PHYSICAL EXAM:      Vitals:    20 1140   BP: (!) 158/63   Pulse: 67   Weight: 61.1 kg (134 lb 11.2 oz)   Height: 5' 5" (1.651 m)       General: This patient is well-developed, well-nourished and appears stated age, well-oriented to person, place and time, and cooperative and pleasant on today's visit    LOWER EXTREMITY  VASCULAR  Diminished pedal pulses b/l  Capillary refill time immediate to the toes.   Feet are warm to the touch. Skin temperature warm to warm from proximally to distally   There are varicosities, telangiectasias noted to bilateral foot and " ankle regions.   There are no ecchymoses noted to bilateral foot and ankle regions.   There is gross lower extremity edema.    DERMATOLOGIC  Skin moist with healthy texture and turgor.  There are no open ulcerations, lacerations, or fissures to bilateral foot and ankle regions. There are no signs of infection as there is no erythema, no proximal-extending lymphangiitis, no fluctuance, or crepitus noted on palpation to bilateral foot and ankle regions.   There is no interdigital maceration.   There is hyperkeratotic lesions noted medial left hallux  Nails 1, 4, 5 RIGHT and LEFT foot thickened, and elongated, the remaining 4 nails were elongated and not thickened     NEUROLOGIC  Epicritic sensation is diminished.   Achilles and patellar deep tendon reflexes intact  Babinski reflex absent    ORTHOPEDIC/BIOMECHANICAL  Muscle strength AT/EHL/EDL/PT: 5/5; Achilles/Gastroc/Soleus: 5/5; PB/PL: 5/5 Muscle tone is normal.  Ankle joint ROM INTACT DF/PF, non-crepitus  STJ ROM  inv/ev, non crepitus         ASSESSMENT     Encounter Diagnoses   Name Primary?    Controlled type 2 diabetes mellitus with diabetic polyneuropathy, with long-term current use of insulin Yes    Dermatophytosis of nail     Arterial insufficiency          PLAN  Patient was educated about clinical and imaging findings, and verbalizes understanding of above.       -With patient's permission, the elongated onychomycotic toenails, as outlined in the physical examination, were sharply debrided with a double action nail nipper to their soft tissue attachment. If indicated, the nails were then smoothed down in thickness with a mechanical rotary chaz and/or hui board to facilitate in further debridement removing all offending nail and subungual debris    -With patient's permission, nails elongated x 4 were trimmed with nail nipper. Patient relates relief following the procedure. Patient will continue to monitor the areas daily, inspect feet, wear protective  shoe gear when ambulatory, moisturizer to maintain skin integrity.    Shoe inspection. Diabetic Foot Educ complications of diabetes. Patient instructed on proper foot hygeine. We discussed wearing proper shoe gear, daily foot inspections, never walking without protective shoe gear, never putting sharp instruments to feet, routine podiatric exams.ation. Patient reminded of the importance of good nutrition and blood sugar control to help prevent podiatric    Future Appointments   Date Time Provider Department Center   9/28/2020  1:35 PM LABORATORY, [facility] LAB [facility]   9/28/2020  2:20 PM Hortencia De La Cruz MD [facility] HEM ONC [facility]   9/28/2020  2:45 PM CHAIR 18 [facility] INFSN [facility]   10/13/2020 11:20 AM Demetrio Mejia MD [facility] CARDIO [facility]   1/7/2021 11:45 AM Anette Montanez DPM [facility] POD [place]       Report Electronically Signed By:    Anette Montanez DPM   Podiatric Medicine & Surgery  [facility]  9/24/2020     Disclaimer:  This note may have been prepared using voice recognition software, it may have not been extensively proofed, as such there could be errors within the text such as sound alike errors.

## 2020-09-25 RX ORDER — SODIUM BICARBONATE 325 MG/1
325 TABLET ORAL 3 TIMES DAILY
Qty: 90 TABLET | Refills: 9 | Status: SHIPPED | OUTPATIENT
Start: 2020-09-25 | End: 2021-12-06

## 2020-09-25 NOTE — TELEPHONE ENCOUNTER
----- Message from Reba Beard sent at 9/25/2020  3:07 PM CDT -----  .Type:  RX Refill Request    Who Called: pt  Refill or New Rx:refill  RX Name and Strength: sodium bicarbonate 325 MG tablet   How is the patient currently taking it? (ex. 1XDay):daily  Is this a 30 day or 90 day RX:30  Preferred Pharmacy with phone number:.  [facility] [id number]  [place], [place] - [address] [facility]  [address] [address] [place] [address]  Phone: [phone] Fax: [phone]  Local or Mail Order:local  Ordering Provider:Brook  Would the patient rather a call back or a response via [facility]? Call back  Best Call Back Number:[phone]  Additional Information:

## 2020-09-28 ENCOUNTER — OFFICE VISIT (OUTPATIENT)
Dept: HEMATOLOGY/ONCOLOGY | Facility: CLINIC | Age: 75
End: 2020-09-28
Payer: MEDICARE

## 2020-09-28 ENCOUNTER — LAB VISIT (OUTPATIENT)
Dept: LAB | Facility: HOSPITAL | Age: 75
End: 2020-09-28
Attending: INTERNAL MEDICINE
Payer: MEDICARE

## 2020-09-28 VITALS
DIASTOLIC BLOOD PRESSURE: 64 MMHG | BODY MASS INDEX: 22 KG/M2 | HEIGHT: 65 IN | SYSTOLIC BLOOD PRESSURE: 159 MMHG | OXYGEN SATURATION: 98 % | WEIGHT: 132.06 LBS | HEART RATE: 65 BPM | TEMPERATURE: 98 F

## 2020-09-28 DIAGNOSIS — D63.1 ANEMIA DUE TO STAGE 5 CHRONIC KIDNEY DISEASE: Primary | ICD-10-CM

## 2020-09-28 DIAGNOSIS — N18.5 ANEMIA DUE TO STAGE 5 CHRONIC KIDNEY DISEASE: ICD-10-CM

## 2020-09-28 DIAGNOSIS — N18.5 ANEMIA DUE TO STAGE 5 CHRONIC KIDNEY DISEASE: Primary | ICD-10-CM

## 2020-09-28 DIAGNOSIS — I10 HYPERTENSION, UNSPECIFIED TYPE: ICD-10-CM

## 2020-09-28 DIAGNOSIS — D63.1 ANEMIA DUE TO STAGE 5 CHRONIC KIDNEY DISEASE: ICD-10-CM

## 2020-09-28 LAB
ALBUMIN SERPL BCP-MCNC: 4.2 G/DL (ref 3.5–5.2)
ALP SERPL-CCNC: 55 U/L (ref 55–135)
ALT SERPL W/O P-5'-P-CCNC: 12 U/L (ref 10–44)
ANION GAP SERPL CALC-SCNC: 20 MMOL/L (ref 8–16)
AST SERPL-CCNC: 16 U/L (ref 10–40)
BASOPHILS # BLD AUTO: 0.04 K/UL (ref 0–0.2)
BASOPHILS NFR BLD: 0.6 % (ref 0–1.9)
BILIRUB SERPL-MCNC: 0.3 MG/DL (ref 0.1–1)
BUN SERPL-MCNC: 120 MG/DL (ref 8–23)
CALCIUM SERPL-MCNC: 9.3 MG/DL (ref 8.7–10.5)
CHLORIDE SERPL-SCNC: 102 MMOL/L (ref 95–110)
CO2 SERPL-SCNC: 18 MMOL/L (ref 23–29)
CREAT SERPL-MCNC: 7.5 MG/DL (ref 0.5–1.4)
DIFFERENTIAL METHOD: ABNORMAL
EOSINOPHIL # BLD AUTO: 0.1 K/UL (ref 0–0.5)
EOSINOPHIL NFR BLD: 0.8 % (ref 0–8)
ERYTHROCYTE [DISTWIDTH] IN BLOOD BY AUTOMATED COUNT: 14.3 % (ref 11.5–14.5)
EST. GFR  (AFRICAN AMERICAN): 6 ML/MIN/1.73 M^2
EST. GFR  (NON AFRICAN AMERICAN): 5 ML/MIN/1.73 M^2
GLUCOSE SERPL-MCNC: 203 MG/DL (ref 70–110)
HCT VFR BLD AUTO: 35.9 % (ref 37–48.5)
HGB BLD-MCNC: 11.3 G/DL (ref 12–16)
IMM GRANULOCYTES # BLD AUTO: 0.02 K/UL (ref 0–0.04)
IMM GRANULOCYTES NFR BLD AUTO: 0.3 % (ref 0–0.5)
LYMPHOCYTES # BLD AUTO: 1.4 K/UL (ref 1–4.8)
LYMPHOCYTES NFR BLD: 19.7 % (ref 18–48)
MCH RBC QN AUTO: 30 PG (ref 27–31)
MCHC RBC AUTO-ENTMCNC: 31.5 G/DL (ref 32–36)
MCV RBC AUTO: 95 FL (ref 82–98)
MONOCYTES # BLD AUTO: 0.4 K/UL (ref 0.3–1)
MONOCYTES NFR BLD: 5.2 % (ref 4–15)
NEUTROPHILS # BLD AUTO: 5.3 K/UL (ref 1.8–7.7)
NEUTROPHILS NFR BLD: 73.7 % (ref 38–73)
NRBC BLD-RTO: 0 /100 WBC
PLATELET # BLD AUTO: 245 K/UL (ref 150–350)
PMV BLD AUTO: 10.4 FL (ref 9.2–12.9)
POTASSIUM SERPL-SCNC: 4.6 MMOL/L (ref 3.5–5.1)
PROT SERPL-MCNC: 7.3 G/DL (ref 6–8.4)
RBC # BLD AUTO: 3.77 M/UL (ref 4–5.4)
SODIUM SERPL-SCNC: 140 MMOL/L (ref 136–145)
WBC # BLD AUTO: 7.25 K/UL (ref 3.9–12.7)

## 2020-09-28 PROCEDURE — 1159F MED LIST DOCD IN RCRD: CPT | Mod: HCNC,S$GLB,, | Performed by: INTERNAL MEDICINE

## 2020-09-28 PROCEDURE — 3078F DIAST BP <80 MM HG: CPT | Mod: HCNC,CPTII,S$GLB, | Performed by: INTERNAL MEDICINE

## 2020-09-28 PROCEDURE — 3008F PR BODY MASS INDEX (BMI) DOCUMENTED: ICD-10-PCS | Mod: HCNC,CPTII,S$GLB, | Performed by: INTERNAL MEDICINE

## 2020-09-28 PROCEDURE — 99999 PR PBB SHADOW E&M-EST. PATIENT-LVL IV: CPT | Mod: PBBFAC,,, | Performed by: INTERNAL MEDICINE

## 2020-09-28 PROCEDURE — 1126F PR PAIN SEVERITY QUANTIFIED, NO PAIN PRESENT: ICD-10-PCS | Mod: HCNC,S$GLB,, | Performed by: INTERNAL MEDICINE

## 2020-09-28 PROCEDURE — 99213 OFFICE O/P EST LOW 20 MIN: CPT | Mod: HCNC,S$GLB,, | Performed by: INTERNAL MEDICINE

## 2020-09-28 PROCEDURE — 99213 PR OFFICE/OUTPT VISIT, EST, LEVL III, 20-29 MIN: ICD-10-PCS | Mod: HCNC,S$GLB,, | Performed by: INTERNAL MEDICINE

## 2020-09-28 PROCEDURE — 99499 UNLISTED E&M SERVICE: CPT | Mod: HCNC,S$GLB,, | Performed by: INTERNAL MEDICINE

## 2020-09-28 PROCEDURE — 3077F PR MOST RECENT SYSTOLIC BLOOD PRESSURE >= 140 MM HG: ICD-10-PCS | Mod: HCNC,CPTII,S$GLB, | Performed by: INTERNAL MEDICINE

## 2020-09-28 PROCEDURE — 99999 PR PBB SHADOW E&M-EST. PATIENT-LVL IV: ICD-10-PCS | Mod: PBBFAC,,, | Performed by: INTERNAL MEDICINE

## 2020-09-28 PROCEDURE — 3078F PR MOST RECENT DIASTOLIC BLOOD PRESSURE < 80 MM HG: ICD-10-PCS | Mod: HCNC,CPTII,S$GLB, | Performed by: INTERNAL MEDICINE

## 2020-09-28 PROCEDURE — 99499 RISK ADDL DX/OHS AUDIT: ICD-10-PCS | Mod: HCNC,S$GLB,, | Performed by: INTERNAL MEDICINE

## 2020-09-28 PROCEDURE — 82728 ASSAY OF FERRITIN: CPT | Mod: HCNC

## 2020-09-28 PROCEDURE — 80053 COMPREHEN METABOLIC PANEL: CPT | Mod: HCNC

## 2020-09-28 PROCEDURE — 1126F AMNT PAIN NOTED NONE PRSNT: CPT | Mod: HCNC,S$GLB,, | Performed by: INTERNAL MEDICINE

## 2020-09-28 PROCEDURE — 3077F SYST BP >= 140 MM HG: CPT | Mod: HCNC,CPTII,S$GLB, | Performed by: INTERNAL MEDICINE

## 2020-09-28 PROCEDURE — 83540 ASSAY OF IRON: CPT | Mod: HCNC

## 2020-09-28 PROCEDURE — 1101F PR PT FALLS ASSESS DOC 0-1 FALLS W/OUT INJ PAST YR: ICD-10-PCS | Mod: HCNC,CPTII,S$GLB, | Performed by: INTERNAL MEDICINE

## 2020-09-28 PROCEDURE — 3008F BODY MASS INDEX DOCD: CPT | Mod: HCNC,CPTII,S$GLB, | Performed by: INTERNAL MEDICINE

## 2020-09-28 PROCEDURE — 36415 COLL VENOUS BLD VENIPUNCTURE: CPT | Mod: HCNC

## 2020-09-28 PROCEDURE — 85025 COMPLETE CBC W/AUTO DIFF WBC: CPT | Mod: HCNC

## 2020-09-28 PROCEDURE — 1101F PT FALLS ASSESS-DOCD LE1/YR: CPT | Mod: HCNC,CPTII,S$GLB, | Performed by: INTERNAL MEDICINE

## 2020-09-28 PROCEDURE — 1159F PR MEDICATION LIST DOCUMENTED IN MEDICAL RECORD: ICD-10-PCS | Mod: HCNC,S$GLB,, | Performed by: INTERNAL MEDICINE

## 2020-09-28 NOTE — PATIENT INSTRUCTIONS
No retacrit needed today  RV in 2 months with possible retacrit  Labs cbc and cmp day prior per pt preference at [place] please
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
## 2020-09-29 LAB
FERRITIN SERPL-MCNC: 285 NG/ML (ref 20–300)
IRON SERPL-MCNC: 107 UG/DL (ref 30–160)
SATURATED IRON: 35 % (ref 20–50)
TOTAL IRON BINDING CAPACITY: 309 UG/DL (ref 250–450)
TRANSFERRIN SERPL-MCNC: 209 MG/DL (ref 200–375)

## 2020-10-13 ENCOUNTER — IMMUNIZATION (OUTPATIENT)
Dept: PHARMACY | Facility: CLINIC | Age: 75
End: 2020-10-13
Payer: MEDICARE

## 2020-10-13 ENCOUNTER — TELEPHONE (OUTPATIENT)
Dept: FAMILY MEDICINE | Facility: CLINIC | Age: 75
End: 2020-10-13

## 2020-10-13 ENCOUNTER — OFFICE VISIT (OUTPATIENT)
Dept: CARDIOLOGY | Facility: CLINIC | Age: 75
End: 2020-10-13
Payer: MEDICARE

## 2020-10-13 VITALS
DIASTOLIC BLOOD PRESSURE: 60 MMHG | BODY MASS INDEX: 21.89 KG/M2 | SYSTOLIC BLOOD PRESSURE: 176 MMHG | WEIGHT: 131.38 LBS | HEART RATE: 66 BPM | HEIGHT: 65 IN | OXYGEN SATURATION: 97 %

## 2020-10-13 DIAGNOSIS — I70.0 AORTIC ATHEROSCLEROSIS: ICD-10-CM

## 2020-10-13 DIAGNOSIS — Z12.39 ENCOUNTER FOR SCREENING FOR MALIGNANT NEOPLASM OF BREAST, UNSPECIFIED SCREENING MODALITY: Primary | ICD-10-CM

## 2020-10-13 DIAGNOSIS — I25.118 CORONARY ARTERY DISEASE OF NATIVE ARTERY OF NATIVE HEART WITH STABLE ANGINA PECTORIS: Primary | ICD-10-CM

## 2020-10-13 DIAGNOSIS — I15.2 HYPERTENSION ASSOCIATED WITH DIABETES: Chronic | ICD-10-CM

## 2020-10-13 DIAGNOSIS — E11.69 HYPERLIPIDEMIA ASSOCIATED WITH TYPE 2 DIABETES MELLITUS: ICD-10-CM

## 2020-10-13 DIAGNOSIS — E11.59 HYPERTENSION ASSOCIATED WITH DIABETES: Chronic | ICD-10-CM

## 2020-10-13 DIAGNOSIS — I50.30 ACCELERATED HYPERTENSION WITH DIASTOLIC CONGESTIVE HEART FAILURE, NYHA CLASS 3: ICD-10-CM

## 2020-10-13 DIAGNOSIS — N18.4 CHRONIC KIDNEY DISEASE (CKD) STAGE G4/A3, SEVERELY DECREASED GLOMERULAR FILTRATION RATE (GFR) BETWEEN 15-29 ML/MIN/1.73 SQUARE METER AND ALBUMINURIA CREATININE RATIO GREATER THAN 300 MG/G: ICD-10-CM

## 2020-10-13 DIAGNOSIS — I65.23 BILATERAL CAROTID ARTERY STENOSIS: ICD-10-CM

## 2020-10-13 DIAGNOSIS — Z12.31 ENCOUNTER FOR SCREENING MAMMOGRAM FOR MALIGNANT NEOPLASM OF BREAST: ICD-10-CM

## 2020-10-13 DIAGNOSIS — I11.0 ACCELERATED HYPERTENSION WITH DIASTOLIC CONGESTIVE HEART FAILURE, NYHA CLASS 3: ICD-10-CM

## 2020-10-13 DIAGNOSIS — E78.5 HYPERLIPIDEMIA ASSOCIATED WITH TYPE 2 DIABETES MELLITUS: ICD-10-CM

## 2020-10-13 DIAGNOSIS — I36.1 NON-RHEUMATIC TRICUSPID VALVE INSUFFICIENCY: ICD-10-CM

## 2020-10-13 DIAGNOSIS — I34.0 NON-RHEUMATIC MITRAL REGURGITATION: ICD-10-CM

## 2020-10-13 PROCEDURE — 3078F PR MOST RECENT DIASTOLIC BLOOD PRESSURE < 80 MM HG: ICD-10-PCS | Mod: HCNC,CPTII,S$GLB, | Performed by: INTERNAL MEDICINE

## 2020-10-13 PROCEDURE — 1101F PT FALLS ASSESS-DOCD LE1/YR: CPT | Mod: HCNC,CPTII,S$GLB, | Performed by: INTERNAL MEDICINE

## 2020-10-13 PROCEDURE — 1126F AMNT PAIN NOTED NONE PRSNT: CPT | Mod: HCNC,S$GLB,, | Performed by: INTERNAL MEDICINE

## 2020-10-13 PROCEDURE — 1159F PR MEDICATION LIST DOCUMENTED IN MEDICAL RECORD: ICD-10-PCS | Mod: HCNC,S$GLB,, | Performed by: INTERNAL MEDICINE

## 2020-10-13 PROCEDURE — 3051F PR MOST RECENT HEMOGLOBIN A1C LEVEL 7.0 - < 8.0%: ICD-10-PCS | Mod: HCNC,CPTII,S$GLB, | Performed by: INTERNAL MEDICINE

## 2020-10-13 PROCEDURE — 1101F PR PT FALLS ASSESS DOC 0-1 FALLS W/OUT INJ PAST YR: ICD-10-PCS | Mod: HCNC,CPTII,S$GLB, | Performed by: INTERNAL MEDICINE

## 2020-10-13 PROCEDURE — 99999 PR PBB SHADOW E&M-EST. PATIENT-LVL V: ICD-10-PCS | Mod: PBBFAC,HCNC,, | Performed by: INTERNAL MEDICINE

## 2020-10-13 PROCEDURE — 99499 UNLISTED E&M SERVICE: CPT | Mod: S$GLB,,, | Performed by: INTERNAL MEDICINE

## 2020-10-13 PROCEDURE — 3078F DIAST BP <80 MM HG: CPT | Mod: HCNC,CPTII,S$GLB, | Performed by: INTERNAL MEDICINE

## 2020-10-13 PROCEDURE — 3008F BODY MASS INDEX DOCD: CPT | Mod: HCNC,CPTII,S$GLB, | Performed by: INTERNAL MEDICINE

## 2020-10-13 PROCEDURE — 3051F HG A1C>EQUAL 7.0%<8.0%: CPT | Mod: HCNC,CPTII,S$GLB, | Performed by: INTERNAL MEDICINE

## 2020-10-13 PROCEDURE — 99214 PR OFFICE/OUTPT VISIT, EST, LEVL IV, 30-39 MIN: ICD-10-PCS | Mod: HCNC,S$GLB,, | Performed by: INTERNAL MEDICINE

## 2020-10-13 PROCEDURE — 99499 RISK ADDL DX/OHS AUDIT: ICD-10-PCS | Mod: S$GLB,,, | Performed by: INTERNAL MEDICINE

## 2020-10-13 PROCEDURE — 99999 PR PBB SHADOW E&M-EST. PATIENT-LVL V: CPT | Mod: PBBFAC,HCNC,, | Performed by: INTERNAL MEDICINE

## 2020-10-13 PROCEDURE — 1126F PR PAIN SEVERITY QUANTIFIED, NO PAIN PRESENT: ICD-10-PCS | Mod: HCNC,S$GLB,, | Performed by: INTERNAL MEDICINE

## 2020-10-13 PROCEDURE — 3008F PR BODY MASS INDEX (BMI) DOCUMENTED: ICD-10-PCS | Mod: HCNC,CPTII,S$GLB, | Performed by: INTERNAL MEDICINE

## 2020-10-13 PROCEDURE — 1159F MED LIST DOCD IN RCRD: CPT | Mod: HCNC,S$GLB,, | Performed by: INTERNAL MEDICINE

## 2020-10-13 PROCEDURE — 3077F SYST BP >= 140 MM HG: CPT | Mod: HCNC,CPTII,S$GLB, | Performed by: INTERNAL MEDICINE

## 2020-10-13 PROCEDURE — 3077F PR MOST RECENT SYSTOLIC BLOOD PRESSURE >= 140 MM HG: ICD-10-PCS | Mod: HCNC,CPTII,S$GLB, | Performed by: INTERNAL MEDICINE

## 2020-10-13 PROCEDURE — 99214 OFFICE O/P EST MOD 30 MIN: CPT | Mod: HCNC,S$GLB,, | Performed by: INTERNAL MEDICINE

## 2020-10-13 RX ORDER — AMLODIPINE BESYLATE 10 MG/1
10 TABLET ORAL DAILY
Qty: 90 TABLET | Refills: 1 | Status: SHIPPED | OUTPATIENT
Start: 2020-10-13 | End: 2020-10-29

## 2020-10-13 NOTE — TELEPHONE ENCOUNTER
----- Message from Carolyne Carbone sent at 10/13/2020 10:51 AM CDT -----  Regarding: mammo  Contact: patient  Patient needs an order for a mammogram, please call her back at  [phone]  . Thank you

## 2020-10-13 NOTE — PROGRESS NOTES
Subjective:   Patient ID:  Avril Monzon is a 75 y.o. female who presents for cardiac consult of Follow-up      Palpitations   Associated symptoms include chest pain, malaise/fatigue and shortness of breath.   Chest Pain   Associated symptoms include malaise/fatigue, palpitations and shortness of breath.     The patient came in today for cardiac consult of Follow-up    Avril Monzon is a 75 y.o. female pt with HTN, hyperlipidemia, DM type II, former tobacco abuse, CKD with AV shunt, anemia, history of CVA,  Carotid artery disease, CAD s/p previous NSTEMI with PCI of LAD in 12/18 here for CV follow up.     Avril Monzon is a 75 y.o. female  admitted to ICU on NIV with a dx of acute on chronic systolic and diastolic CHF exacerbation , Volume overload , Uncontrolled HTN , Acute hypoxic respiratory failure  And CKD stage 5 . She was started on NIV , NTG drip and lasix drip with and improvement of her sx . She is (-) 2.3 lt since admission . She is wean off  BIPAP .   She cont on NTG drip and lasix drip . The BP has improved  . Her home BP medication were resume .   She has improved with IV diuresis, cardiology consulted for CHF. Discussed will need volume removal and will work up pulm HTN as outpt.    7/4/20 - changed to lasix 80 with BIdil, pt feels well ready for DC today. NO acute events overnight. Will follow up in CV clinic    7/20/20 - Hosp follow up  ECHO in hosp with elevated PA pressures, with mild to moderate TR/MR. She feels well, did not need oxygen at night. Swelling is stable/improved. No CP. Is active at home.     8/28/20  She has been feeling better but has been having more palpitations x 1 week. Stable dyspnea no chest pain. She feels more tired at times. BP stable at home, is on Torsemide now.    10/13/20  Holter with occ PVCs, freq PACs, AVG HR 72. No further palpitations.  BP elevated today 176/60. She also has pain in right shoulder blade, going to right breast. She slept with arm above head  once.     Patient feels  no chest pain, no PND, no dizziness, no syncope, no CNS symptoms.    Patient has fairly good exercise tolerance.    Patient is compliant with medications.    HOLTER 8/13/20  Predominant Rhythm  Sinus rhythm with heart rates varying between 57 and 96 bpm with an average of 72 bpm.   PVC    Ventricular Arrhythmias  There were occasional PVCs totalling 1078 and averaging 11.23 per hour.   PAC    Supraventricular Arrhythmias  There were frequent PACs totalling 6784 and averaging 70.67 per hour.         Results for orders placed during the hospital encounter of 07/01/20   Echo Color Flow Doppler? Yes    Narrative · Concentric left ventricular hypertrophy.  · Normal left ventricular systolic function. The estimated ejection   fraction is 60%.  · Grade II (moderate) left ventricular diastolic dysfunction consistent   with pseudonormalization.  · Normal right ventricular systolic function.  · Mild mitral regurgitation.  · Mild to moderate tricuspid regurgitation.  · Mild pulmonic regurgitation.  · Moderate mitral prolapse of the anterior mitral leaflet.  · Normal central venous pressure (3 mmHg).  · The estimated PA systolic pressure is 68 mmHg.  · Pulmonary hypertension present.        1/2019 CUS  CONCLUSIONS   There is 40 - 49% right Internal Carotid stenosis.   There is 40 - 49% left Internal Carotid stenosis.       Past Medical History:   Diagnosis Date    Acute hypoxemic respiratory failure 11/26/2018    Anemia     Arthritis     back, hand, knees    Back pain     Cataract     CKD stage G4/A3, GFR 15 - 29 and albumin creatinine ratio >300 mg/g     GFR 40% Jan 2014 and 33% ub 3/2014 ([facility])    Coronary artery disease involving native coronary artery of native heart 11/30/2018    Diabetic retinopathy     DM (diabetes mellitus) type II controlled, neurological manifestation     Exudative age-related macular degeneration of left eye with active choroidal neovascularization 2/5/2019    GERD  (gastroesophageal reflux disease)     Glaucoma     Heart failure     Hyperlipidemia     Hypertension     Non-ST elevation MI (NSTEMI) 2018    NSTEMI (non-ST elevated myocardial infarction) 2018    Peripheral neuropathy     Polyneuropathy     Proteinuria     >6 mo     Seizures     [facility] 2014 -dick CT NRI showed small vessel    Stroke ,    Tobacco dependence     Type 2 diabetes with peripheral circulatory disorder, controlled        Past Surgical History:   Procedure Laterality Date    BREAST LUMPECTOMY Left     benign, when patient was 15 years old    BREAST MASS EXCISION      ,benign, age 15.    CATHETERIZATION OF BOTH LEFT AND RIGHT HEART N/A 2018    Procedure: CATHETERIZATION, HEART, BOTH LEFT AND RIGHT;  Surgeon: Michelle Holman MD;  Location: [facility] CATH LAB;  Service: Cardiology;  Laterality: N/A;    CATHETERIZATION OF BOTH LEFT AND RIGHT HEART N/A 2018    Procedure: CATHETERIZATION, HEART, BOTH LEFT AND RIGHT;  Surgeon: Michelle Holman MD;  Location: [facility] CATH LAB;  Service: Cardiology;  Laterality: N/A;    HYSTERECTOMY  1982    INSERTION OF SHUNT      LEFT HEART CATHETERIZATION Left 12/3/2018    Procedure: CATHETERIZATION, HEART, LEFT;  Surgeon: Michelle Holman MD;  Location: [facility] CATH LAB;  Service: Cardiology;  Laterality: Left;  LAD ATHERECTOMY STENT/ FEMORAL APPROACH    OOPHORECTOMY      wrist cyst Right 1980s    dorsal wrist cyst       Social History     Tobacco Use    Smoking status: Former Smoker     Packs/day: 1.50     Years: 30.00     Pack years: 45.00     Types: Cigarettes     Quit date: 1990     Years since quittin.7    Smokeless tobacco: Former User   Substance Use Topics    Alcohol use: No     Alcohol/week: 0.0 standard drinks    Drug use: No       Family History   Problem Relation Age of Onset    Diabetes Mother     Hyperlipidemia Mother     Hypertension Mother     Heart disease Mother         MI    Muscular dystrophy Son      Cancer Maternal Grandmother         colon       Patient's Medications   New Prescriptions    No medications on file   Previous Medications    ACCU-CHEK [place]DEN PLUS [place] [facility] [place]    USE TO TEST TWICE DAILY    AMLODIPINE (NORVASC) 5 MG TABLET    Take 5 mg by mouth once daily.    ASPIRIN (ECOTRIN) 81 MG EC TABLET    Take 1 tablet (81 mg total) by mouth once daily.    ATORVASTATIN (LIPITOR) 80 MG TABLET    Take 1 tablet (80 mg total) by mouth once daily.    ATROPINE 1% (ISOPTO ATROPINE) 1 % DROP    Place 1 drop into the left eye 3 (three) times daily.    BIDIL 20-37.5 MG TAB    TAKE 2 TABLETS BY MOUTH THREE TIMES DAILY    BLOOD SUGAR DIAGNOSTIC STRP    1 strip by Misc.(Non-Drug; Combo Route) route 3 (three) times daily. relion ultima    BRIMONIDINE-TIMOLOL (COMBIGAN) 0.2-0.5 % DROP    Place 1 drop into the left eye 2 (two) times a day.    CALCITRIOL (ROCALTROL) 0.25 MCG CAP    Take 1 capsule by mouth once daily    CLOPIDOGREL (PLAVIX) 75 MG TABLET    TAKE 1 TABLET BY MOUTH ONCE DAILY    FLUTICASONE PROPIONATE (FLONASE) 50 MCG/ACTUATION NASAL SPRAY    1 spray (50 mcg total) by Each Nostril route once daily.    FUROSEMIDE (LASIX) 80 MG TABLET    Take 1 tablet (80 mg total) by mouth 2 (two) times daily.    GABAPENTIN (NEURONTIN) 300 MG CAPSULE    Take 1 capsule (300 mg total) by mouth every evening.    INSULIN NPH-INSULIN REGULAR, 70/30, (NOVOLIN 70/30) 100 UNIT/ML (70-30) INJECTION    INJECT SUBCUTANEOUSLY 30 UNITS IN THE MORNING AND INJECT 25 UNITS IN THE EVENING    LANCETS (ACCU-CHEK SOFTCLIX LANCETS) MISC    1 lancet by Misc.(Non-Drug; Combo Route) route 3 (three) times daily.    LORATADINE (CLARITIN) 10 MG TABLET    Take 1 tablet (10 mg total) by mouth once daily.    METOPROLOL TARTRATE (LOPRESSOR) 25 MG TABLET    Take 1 tablet by mouth twice daily    METOPROLOL TARTRATE (LOPRESSOR) 50 MG TABLET    Take 1 tablet (50 mg total) by mouth 2 (two) times daily.    NITROGLYCERIN (NITROSTAT) 0.4 MG SL TABLET    Place 1 tablet  "(0.4 mg total) under the tongue every 5 (five) minutes as needed for Chest pain.    ONDANSETRON (ZOFRAN-ODT) 4 MG TBDL    DISSOLVE 1 TABLET IN MOUTH THREE TIMES DAILY AS NEEDED FOR NAUSEA FOR 5 DAYS    SODIUM BICARBONATE 325 MG TABLET    Take 1 tablet (325 mg total) by mouth 3 (three) times daily.    TORSEMIDE (DEMADEX) 20 MG TAB    Take 2 tablets by mouth once daily   Modified Medications    No medications on file   Discontinued Medications    No medications on file       Review of Systems   Constitutional: Positive for malaise/fatigue.   HENT: Negative.    Eyes: Negative.    Respiratory: Positive for shortness of breath.    Cardiovascular: Positive for chest pain, palpitations and leg swelling.   Gastrointestinal: Negative.    Genitourinary: Negative.    Musculoskeletal: Negative.    Skin: Negative.    Neurological: Negative.    Endo/Heme/Allergies: Negative.    Psychiatric/Behavioral: Negative.    All 12 systems otherwise negative.      Wt Readings from Last 3 Encounters:   10/13/20 59.6 kg (131 lb 6.3 oz)   09/28/20 59.9 kg (132 lb 0.9 oz)   09/24/20 61.1 kg (134 lb 11.2 oz)     Temp Readings from Last 3 Encounters:   09/28/20 97.8 °F (36.6 °C) (Oral)   09/09/20 97.9 °F (36.6 °C)   08/26/20 98.1 °F (36.7 °C)     BP Readings from Last 3 Encounters:   10/13/20 (!) 176/60   09/28/20 (!) 159/64   09/24/20 (!) 158/63     Pulse Readings from Last 3 Encounters:   10/13/20 66   09/28/20 65   09/24/20 67       BP (!) 176/60 (BP Location: Right arm, Patient Position: Sitting, BP Method: Medium (Manual))   Pulse 66   Ht 5' 5" (1.651 m)   Wt 59.6 kg (131 lb 6.3 oz)   LMP  (LMP Unknown)   SpO2 97%   BMI 21.87 kg/m²     Objective:   Physical Exam   Constitutional: She is oriented to person, place, and time. She appears well-developed and well-nourished. No distress.   HENT:   Head: Normocephalic and atraumatic.   Nose: Nose normal.   Mouth/Throat: Oropharynx is clear and moist.   Eyes: Conjunctivae and EOM are normal. " No scleral icterus.   Neck: Normal range of motion. Neck supple. No JVD present. No thyromegaly present.   Cardiovascular: Normal rate, regular rhythm, S1 normal and S2 normal. Exam reveals no gallop, no S3, no S4 and no friction rub.   Murmur heard.  Pulmonary/Chest: Effort normal and breath sounds normal. No stridor. No respiratory distress. She has no wheezes. She has no rales. She exhibits no tenderness.   Abdominal: Soft. Bowel sounds are normal. She exhibits no distension and no mass. There is no abdominal tenderness. There is no rebound.   Genitourinary:    Genitourinary Comments: Deferred     Musculoskeletal: Normal range of motion.         General: Edema present. No tenderness or deformity.   Lymphadenopathy:     She has no cervical adenopathy.   Neurological: She is alert and oriented to person, place, and time. She exhibits normal muscle tone. Coordination normal.   Skin: Skin is warm and dry. No rash noted. She is not diaphoretic. No erythema. No pallor.   Psychiatric: She has a normal mood and affect. Her behavior is normal. Judgment and thought content normal.   Nursing note and vitals reviewed.      Lab Results   Component Value Date     09/28/2020    K 4.6 09/28/2020     09/28/2020    CO2 18 (L) 09/28/2020     (H) 09/28/2020    CREATININE 7.5 (H) 09/28/2020     (H) 09/28/2020    HGBA1C 7.9 (H) 07/02/2020    MG 2.0 07/02/2020    AST 16 09/28/2020    ALT 12 09/28/2020    ALBUMIN 4.2 09/28/2020    PROT 7.3 09/28/2020    BILITOT 0.3 09/28/2020    WBC 7.25 09/28/2020    HGB 11.3 (L) 09/28/2020    HCT 35.9 (L) 09/28/2020    MCV 95 09/28/2020     09/28/2020    INR 0.9 06/15/2020    TSH 1.740 03/27/2019    CHOL 150 03/27/2019    HDL 45 03/27/2019    LDLCALC 90.0 03/27/2019    TRIG 75 03/27/2019     (H) 07/03/2020     Assessment:      1. Coronary artery disease of native artery of native heart with stable angina pectoris    2. Bilateral carotid artery stenosis    3.  Aortic atherosclerosis    4. Accelerated hypertension with diastolic congestive heart failure, NYHA class 3    5. Hyperlipidemia associated with type 2 diabetes mellitus    6. Hypertension associated with diabetes    7. Non-rheumatic mitral regurgitation    8. Non-rheumatic tricuspid valve insufficiency    9. Chronic kidney disease (CKD) stage G4/A3, severely decreased glomerular filtration rate (GFR) between 15-29 mL/min/1.73 square meter and albuminuria creatinine ratio greater than 300 mg/g        Plan:   1. HFpEF with mild to mod MR/TR  - cont torsemide  - monitor BP and weights  - low salt diet  - rec compression stockings    2. CAD s/p PCI  - cont asa, plavix, statin, BB  - stable    3. HTN - uncontrolled   - cont meds and titrate       4. Carotid artery stenosis , mild to mod  - cont to monitor; order repeat u/s  - cont asa, statin    5. CKD4-5  - cont to monitor closely with renal  - HD when needed  - refer to nutrition services as well for CKD, CAD, HTN diet    6. H/o CVA  - cont asa, statin    7. Palpitations  - Holter -  occ PVCs, freq PACs, AVG HR 72   - cont BB  - dec caffine       Thank you for allowing me to participate in this patient's care. Please do not hesitate to contact me with any questions or concerns. Consult note has been forwarded to the referral physician.

## 2020-10-14 ENCOUNTER — TELEPHONE (OUTPATIENT)
Dept: CARDIOLOGY | Facility: CLINIC | Age: 75
End: 2020-10-14

## 2020-10-14 NOTE — TELEPHONE ENCOUNTER
----- Message from Ratna Rollins sent at 10/14/2020  3:16 PM CDT -----  States she was seen yesterday and her medicine is not at the pharmacy. Pt uses     [place] [id number] - [place] - [address] [facility]  [address] [place] [address]  Phone: [phone] Fax: [phone]    Please call pt [phone]. Thank you

## 2020-10-19 DIAGNOSIS — N18.5 STAGE 5 CHRONIC KIDNEY DISEASE: Primary | ICD-10-CM

## 2020-10-20 ENCOUNTER — HOSPITAL ENCOUNTER (OUTPATIENT)
Dept: CARDIOLOGY | Facility: HOSPITAL | Age: 75
Discharge: HOME | DRG: 699 | End: 2020-10-20
Attending: INTERNAL MEDICINE
Payer: MEDICARE

## 2020-10-20 ENCOUNTER — OFFICE VISIT (OUTPATIENT)
Dept: NEPHROLOGY | Facility: CLINIC | Age: 75
DRG: 699 | End: 2020-10-20
Payer: MEDICARE

## 2020-10-20 ENCOUNTER — LAB VISIT (OUTPATIENT)
Dept: LAB | Facility: HOSPITAL | Age: 75
End: 2020-10-20
Attending: INTERNAL MEDICINE
Payer: MEDICARE

## 2020-10-20 VITALS
DIASTOLIC BLOOD PRESSURE: 50 MMHG | WEIGHT: 129.19 LBS | HEIGHT: 65 IN | BODY MASS INDEX: 21.52 KG/M2 | RESPIRATION RATE: 20 BRPM | SYSTOLIC BLOOD PRESSURE: 128 MMHG | HEART RATE: 80 BPM

## 2020-10-20 VITALS
HEIGHT: 65 IN | SYSTOLIC BLOOD PRESSURE: 176 MMHG | DIASTOLIC BLOOD PRESSURE: 60 MMHG | WEIGHT: 131 LBS | BODY MASS INDEX: 21.83 KG/M2

## 2020-10-20 DIAGNOSIS — N18.5 STAGE 5 CHRONIC KIDNEY DISEASE: Primary | ICD-10-CM

## 2020-10-20 DIAGNOSIS — I65.23 BILATERAL CAROTID ARTERY STENOSIS: ICD-10-CM

## 2020-10-20 DIAGNOSIS — N18.5 STAGE 5 CHRONIC KIDNEY DISEASE: ICD-10-CM

## 2020-10-20 LAB
ALBUMIN SERPL BCP-MCNC: 3.8 G/DL (ref 3.5–5.2)
ANION GAP SERPL CALC-SCNC: 19 MMOL/L (ref 8–16)
BUN SERPL-MCNC: 143 MG/DL (ref 8–23)
CALCIUM SERPL-MCNC: 9.1 MG/DL (ref 8.7–10.5)
CHLORIDE SERPL-SCNC: 105 MMOL/L (ref 95–110)
CO2 SERPL-SCNC: 16 MMOL/L (ref 23–29)
CREAT SERPL-MCNC: 7.8 MG/DL (ref 0.5–1.4)
EST. GFR  (AFRICAN AMERICAN): 5 ML/MIN/1.73 M^2
EST. GFR  (NON AFRICAN AMERICAN): 5 ML/MIN/1.73 M^2
GLUCOSE SERPL-MCNC: 296 MG/DL (ref 70–110)
LEFT ARM DIASTOLIC BLOOD PRESSURE: 76 MMHG
LEFT ARM SYSTOLIC BLOOD PRESSURE: 176 MMHG
LEFT CBA DIAS: 8 CM/S
LEFT CBA SYS: 94 CM/S
LEFT CCA DIST DIAS: 7 CM/S
LEFT CCA DIST SYS: 75 CM/S
LEFT CCA MID DIAS: 11 CM/S
LEFT CCA MID SYS: 101 CM/S
LEFT CCA PROX DIAS: 9 CM/S
LEFT CCA PROX SYS: 97 CM/S
LEFT ECA DIAS: 9 CM/S
LEFT ECA SYS: 318 CM/S
LEFT ICA DIST DIAS: 26 CM/S
LEFT ICA DIST SYS: 146 CM/S
LEFT ICA MID DIAS: 21 CM/S
LEFT ICA MID SYS: 92 CM/S
LEFT ICA PROX DIAS: 12 CM/S
LEFT ICA PROX SYS: 94 CM/S
LEFT VERTEBRAL DIAS: 10 CM/S
LEFT VERTEBRAL SYS: 42 CM/S
OHS CV CAROTID RIGHT ICA EDV HIGHEST: 20
OHS CV CAROTID ULTRASOUND LEFT ICA/CCA RATIO: 1.95
OHS CV CAROTID ULTRASOUND RIGHT ICA/CCA RATIO: 1.41
OHS CV PV CAROTID LEFT HIGHEST CCA: 101
OHS CV PV CAROTID LEFT HIGHEST ICA: 146
OHS CV PV CAROTID RIGHT HIGHEST CCA: 100
OHS CV PV CAROTID RIGHT HIGHEST ICA: 110
OHS CV US CAROTID LEFT HIGHEST EDV: 26
PHOSPHATE SERPL-MCNC: 6.9 MG/DL (ref 2.7–4.5)
POTASSIUM SERPL-SCNC: 5.2 MMOL/L (ref 3.5–5.1)
RIGHT ARM DIASTOLIC BLOOD PRESSURE: 70 MMHG
RIGHT ARM SYSTOLIC BLOOD PRESSURE: 170 MMHG
RIGHT CBA DIAS: 4 CM/S
RIGHT CBA SYS: 54 CM/S
RIGHT CCA DIST DIAS: 6 CM/S
RIGHT CCA DIST SYS: 78 CM/S
RIGHT CCA MID DIAS: 5 CM/S
RIGHT CCA MID SYS: 76 CM/S
RIGHT CCA PROX DIAS: 6 CM/S
RIGHT CCA PROX SYS: 100 CM/S
RIGHT ECA DIAS: 32 CM/S
RIGHT ECA SYS: 291 CM/S
RIGHT ICA DIST DIAS: 20 CM/S
RIGHT ICA DIST SYS: 110 CM/S
RIGHT ICA MID DIAS: 18 CM/S
RIGHT ICA MID SYS: 78 CM/S
RIGHT ICA PROX DIAS: 10 CM/S
RIGHT ICA PROX SYS: 53 CM/S
RIGHT VERTEBRAL DIAS: 8 CM/S
RIGHT VERTEBRAL SYS: 59 CM/S
SODIUM SERPL-SCNC: 140 MMOL/L (ref 136–145)

## 2020-10-20 PROCEDURE — 1101F PT FALLS ASSESS-DOCD LE1/YR: CPT | Mod: HCNC,CPTII,S$GLB, | Performed by: INTERNAL MEDICINE

## 2020-10-20 PROCEDURE — 99499 UNLISTED E&M SERVICE: CPT | Mod: S$GLB,,, | Performed by: INTERNAL MEDICINE

## 2020-10-20 PROCEDURE — 1125F PR PAIN SEVERITY QUANTIFIED, PAIN PRESENT: ICD-10-PCS | Mod: HCNC,S$GLB,, | Performed by: INTERNAL MEDICINE

## 2020-10-20 PROCEDURE — 99999 PR PBB SHADOW E&M-EST. PATIENT-LVL IV: CPT | Mod: PBBFAC,HCNC,, | Performed by: INTERNAL MEDICINE

## 2020-10-20 PROCEDURE — 80069 RENAL FUNCTION PANEL: CPT | Mod: HCNC

## 2020-10-20 PROCEDURE — 3078F DIAST BP <80 MM HG: CPT | Mod: HCNC,CPTII,S$GLB, | Performed by: INTERNAL MEDICINE

## 2020-10-20 PROCEDURE — 99499 RISK ADDL DX/OHS AUDIT: ICD-10-PCS | Mod: S$GLB,,, | Performed by: INTERNAL MEDICINE

## 2020-10-20 PROCEDURE — 1125F AMNT PAIN NOTED PAIN PRSNT: CPT | Mod: HCNC,S$GLB,, | Performed by: INTERNAL MEDICINE

## 2020-10-20 PROCEDURE — 3074F SYST BP LT 130 MM HG: CPT | Mod: HCNC,CPTII,S$GLB, | Performed by: INTERNAL MEDICINE

## 2020-10-20 PROCEDURE — 36415 COLL VENOUS BLD VENIPUNCTURE: CPT | Mod: HCNC

## 2020-10-20 PROCEDURE — 93880 EXTRACRANIAL BILAT STUDY: CPT | Mod: 26,HCNC,, | Performed by: INTERNAL MEDICINE

## 2020-10-20 PROCEDURE — 99215 PR OFFICE/OUTPT VISIT, EST, LEVL V, 40-54 MIN: ICD-10-PCS | Mod: HCNC,S$GLB,, | Performed by: INTERNAL MEDICINE

## 2020-10-20 PROCEDURE — 93880 CV US DOPPLER CAROTID (CUPID ONLY): ICD-10-PCS | Mod: 26,HCNC,, | Performed by: INTERNAL MEDICINE

## 2020-10-20 PROCEDURE — 1159F PR MEDICATION LIST DOCUMENTED IN MEDICAL RECORD: ICD-10-PCS | Mod: HCNC,S$GLB,, | Performed by: INTERNAL MEDICINE

## 2020-10-20 PROCEDURE — 93880 EXTRACRANIAL BILAT STUDY: CPT | Mod: HCNC

## 2020-10-20 PROCEDURE — 3008F BODY MASS INDEX DOCD: CPT | Mod: HCNC,CPTII,S$GLB, | Performed by: INTERNAL MEDICINE

## 2020-10-20 PROCEDURE — 1101F PR PT FALLS ASSESS DOC 0-1 FALLS W/OUT INJ PAST YR: ICD-10-PCS | Mod: HCNC,CPTII,S$GLB, | Performed by: INTERNAL MEDICINE

## 2020-10-20 PROCEDURE — 99215 OFFICE O/P EST HI 40 MIN: CPT | Mod: HCNC,S$GLB,, | Performed by: INTERNAL MEDICINE

## 2020-10-20 PROCEDURE — 3008F PR BODY MASS INDEX (BMI) DOCUMENTED: ICD-10-PCS | Mod: HCNC,CPTII,S$GLB, | Performed by: INTERNAL MEDICINE

## 2020-10-20 PROCEDURE — 99999 PR PBB SHADOW E&M-EST. PATIENT-LVL IV: ICD-10-PCS | Mod: PBBFAC,HCNC,, | Performed by: INTERNAL MEDICINE

## 2020-10-20 PROCEDURE — 3074F PR MOST RECENT SYSTOLIC BLOOD PRESSURE < 130 MM HG: ICD-10-PCS | Mod: HCNC,CPTII,S$GLB, | Performed by: INTERNAL MEDICINE

## 2020-10-20 PROCEDURE — 3078F PR MOST RECENT DIASTOLIC BLOOD PRESSURE < 80 MM HG: ICD-10-PCS | Mod: HCNC,CPTII,S$GLB, | Performed by: INTERNAL MEDICINE

## 2020-10-20 PROCEDURE — 1159F MED LIST DOCD IN RCRD: CPT | Mod: HCNC,S$GLB,, | Performed by: INTERNAL MEDICINE

## 2020-10-20 NOTE — PROGRESS NOTES
NEPHROLOGY CLINIC FOLLOWUP NOTE:  Date of visit: 10/20/20   REASON FOR FOLLOWUP AND CHIEF COMPLAINT:  Advanced chronic kidney disease stage 5     HISTORY OF PRESENT ILLNESS:  Ms. Monzon is a 75-year-old AA female with a history of slowly progressive CKD stage V, diabetes mellitus, and hypertension, who presents for followup. Pt has nephrotic syndrome suspected due to diabetic nephropathy. She is now pre-ESRD. She was advised in the past that she will need dialysis in the long term. She was last seen by me in April 2020 in the clinic, and by other nephrologists in July 2020 at [facility] after admission for CHF exacerbation. Pt is now s/p L UE AVF by Dr. White on 4/16/20. Due to poor eye sight and low motivation, pt is not a good candidate for PD.    On f/u today, she has chronic tiredness and low appetite. No CP, no SOB at rest. No acute or new c/o's. Spoke with pt's daughter on the phone.     To review, pt (as known to me prior to 2015) was non-compliant with BP meds and with diet. This was documented in renal noted before. On f/u today, pt has no new or acute c/o's, no SOB, no legs welling, no CP. Except for tiredness, pt has little sx's of uremia, no nausea, no loss of appetite, no wt loss, no itching, no sx's of polyuria.        PAST MEDICAL HISTORY:  1.  CKD stage V.  2.  Hypertension.  3.  Diabetic nephropathy and nephrotic syndrome.  4.  Diabetes mellitus for 25 years.  5.  Hyperlipidemia.  6.  Anemia related to CKD, following with Heme/Onc.  7.  Diabetic peripheral neuropathy.  8.  GERD.  9.  Seizure history, likely due to uncontrolled blood pressure in the past.     PAST SURGICAL HISTORY:  Reviewed and unchanged.     FAMILY HISTORY:  Reviewed and unchanged.  Multiple family members with diabetes   mellitus.     ALLERGIES:  Reviewed, to codeine.     SOCIAL HISTORY:  Quit smoking about 30 years ago.     MEDICATIONS:  Reviewed.      Current Outpatient Medications:     ACCU-CHEK ARDEN PLUS METER Misc, USE TO TEST  TWICE DAILY, Disp: , Rfl: 0    amLODIPine (NORVASC) 10 MG tablet, Take 1 tablet (10 mg total) by mouth once daily., Disp: 90 tablet, Rfl: 1    aspirin (ECOTRIN) 81 MG EC tablet, Take 1 tablet (81 mg total) by mouth once daily., Disp: , Rfl: 0    atorvastatin (LIPITOR) 80 MG tablet, Take 1 tablet (80 mg total) by mouth once daily. (Patient taking differently: Take 40 mg by mouth once daily. ), Disp: 30 tablet, Rfl: 11    atropine 1% (ISOPTO ATROPINE) 1 % Drop, Place 1 drop into the left eye 3 (three) times daily., Disp: 5 mL, Rfl: 1    BIDIL 20-37.5 mg Tab, TAKE 2 TABLETS BY MOUTH THREE TIMES DAILY, Disp: 180 tablet, Rfl: 3    blood sugar diagnostic Strp, 1 strip by Misc.(Non-Drug; Combo Route) route 3 (three) times daily. relion ultima, Disp: 100 strip, Rfl: 11    calcitRIOL (ROCALTROL) 0.25 MCG Cap, Take 1 capsule by mouth once daily, Disp: 30 capsule, Rfl: 9    clopidogrel (PLAVIX) 75 mg tablet, TAKE 1 TABLET BY MOUTH ONCE DAILY, Disp: 39 tablet, Rfl: 6    fluticasone propionate (FLONASE) 50 mcg/actuation nasal spray, 1 spray (50 mcg total) by Each Nostril route once daily., Disp: 16 g, Rfl: 0    furosemide (LASIX) 80 MG tablet, Take 1 tablet (80 mg total) by mouth 2 (two) times daily., Disp: 60 tablet, Rfl: 11    insulin NPH-insulin regular, 70/30, (NOVOLIN 70/30) 100 unit/mL (70-30) injection, INJECT SUBCUTANEOUSLY 30 UNITS IN THE MORNING AND INJECT 25 UNITS IN THE EVENING (Patient taking differently: INJECT SUBCUTANEOUSLY 30 UNITS IN THE MORNING AND INJECT 25 UNITS IN THE EVENING), Disp: 2 vial, Rfl: 3    loratadine (CLARITIN) 10 mg tablet, Take 1 tablet (10 mg total) by mouth once daily., Disp: 30 tablet, Rfl: 0    metoprolol tartrate (LOPRESSOR) 25 MG tablet, Take 1 tablet by mouth twice daily, Disp: 180 tablet, Rfl: 0    nitroGLYCERIN (NITROSTAT) 0.4 MG SL tablet, Place 1 tablet (0.4 mg total) under the tongue every 5 (five) minutes as needed for Chest pain., Disp: 20 tablet, Rfl: 0    sodium  "bicarbonate 325 MG tablet, Take 1 tablet (325 mg total) by mouth 3 (three) times daily., Disp: 90 tablet, Rfl: 9    torsemide (DEMADEX) 20 MG Tab, Take 2 tablets by mouth once daily, Disp: 180 tablet, Rfl: 2    brimonidine-timoloL (COMBIGAN) 0.2-0.5 % Drop, Place 1 drop into the left eye 2 (two) times a day., Disp: 5 mL, Rfl: 1    gabapentin (NEURONTIN) 300 MG capsule, Take 1 capsule (300 mg total) by mouth every evening., Disp: 90 capsule, Rfl: 1    lancets (ACCU-CHEK SOFTCLIX LANCETS) Misc, 1 lancet by Misc.(Non-Drug; Combo Route) route 3 (three) times daily., Disp: 100 each, Rfl: 11    metoprolol tartrate (LOPRESSOR) 50 MG tablet, Take 1 tablet (50 mg total) by mouth 2 (two) times daily., Disp: 60 tablet, Rfl: 3    ondansetron (ZOFRAN-ODT) 4 MG TbDL, DISSOLVE 1 TABLET IN MOUTH THREE TIMES DAILY AS NEEDED FOR NAUSEA FOR 5 DAYS, Disp: , Rfl: 0     REVIEW OF SYSTEMS:  No recent hospitalizations.  GENERAL:  Negative.  HEAD, EYES, EARS, NOSE, AND THROAT:  Negative.  CARDIAC:  Negative.  PULMONARY:  Negative.  GASTROINTESTINAL:  Negative.  GENITOURINARY:  Negative.  PSYCHOLOGICAL:  Negative.  NEUROLOGICAL:  Negative.  ENDOCRINE:  As above, otherwise negative.  HEMATOLOGIC AND ONCOLOGIC:  Negative.  INFECTIOUS DISEASE:  Negative.  The rest of the review of systems negative.     PHYSICAL EXAMINATION: Blood pressure (!) 128/50, pulse 80, resp. rate 20, height 5' 5" (1.651 m), weight 58.6 kg (129 lb 3 oz).  ambulatory unassisted  Speech and thought process normal  In NAD  No JVD  RRR, s1 and s2 audible, no rubs  CTA B no rales, no wheezes  L UE AVF: + bruits, + thrill  No edema LE's     LABS:  Reviewed.  BMP  Lab Results   Component Value Date     10/20/2020    K 5.2 (H) 10/20/2020     10/20/2020    CO2 16 (L) 10/20/2020     (H) 10/20/2020    CREATININE 7.8 (H) 10/20/2020    CALCIUM 9.1 10/20/2020    ANIONGAP 19 (H) 10/20/2020    ESTGFRAFRICA 5 (A) 10/20/2020    EGFRNONAA 5 (A) 10/20/2020     Lab " Results   Component Value Date    WBC 7.25 09/28/2020    HGB 11.3 (L) 09/28/2020    HCT 35.9 (L) 09/28/2020    MCV 95 09/28/2020     09/28/2020       Lab Results   Component Value Date    .0 (H) 04/08/2020    CALCIUM 9.1 10/20/2020    PHOS 6.9 (H) 10/20/2020     Urine prot/Cr 1.3 g     ASSESSMENT AND PLAN:  A 75-year-old female with advanced chronic kidney disease, stage 5, who presents for f/u:     1.  Renal. advanced CKD stage 5  Has sx's of uremia  Hyperkalemia  Metabolic acidosis  Hyperphosphatemia     Pt will benefit from starting chronic HD   Advised pt and her daughter on the phone  Is  S/p L UE AVF by Dr. White on 4/16/20, appears patent  At risk of sudden cardiac death from advanced renal failure  Agreed to start HD  Pt will go to ER for admission next few days    Renal failure due to HTN, DM, prior smoking  BP previously was uncontrolled  H/o of non-compliance with meds  See prior noted (as far back as 2015)     Pt has poor sight and poor motivation, not a good candidate for PD  HD explained to pt     Agreed to see vascular  Appt was made to see Dr White on 4/16/20     2.  Hypertension. Could not be assessed today  Meds reviewed  Low salt diet advised     3.  Secondary hyperparathyroidism. On calcitriol. Continue.     4.  Anemia. Receiving Epogen and iron at Heme/Onc     5.  Proteinuria. Sub-nephrotic range proteinuria.   Can not take ACE-I or ARB due to borderline high K     PLANS AND RECOMMENDATIONS:  Discussed with the patient as above.    Opportunity for questions and discussion provided.  Total time spent 40 minutes including time needed to review the records, the   patient evaluation, documentation, face-to-face discussion with the patient,   more than 50% of the time was spent on coordination of care and counseling.    Level V visit.     Angela Hanson MD

## 2020-10-21 ENCOUNTER — HOSPITAL ENCOUNTER (INPATIENT)
Facility: HOSPITAL | Age: 75
LOS: 3 days | Discharge: HOME | DRG: 699 | End: 2020-10-24
Attending: EMERGENCY MEDICINE | Admitting: INTERNAL MEDICINE
Payer: MEDICARE

## 2020-10-21 ENCOUNTER — TELEPHONE (OUTPATIENT)
Dept: CARDIOLOGY | Facility: CLINIC | Age: 75
End: 2020-10-21

## 2020-10-21 DIAGNOSIS — Z99.2 ENCOUNTER FOR DIALYSIS: Primary | ICD-10-CM

## 2020-10-21 DIAGNOSIS — Z99.2 ESRD NEEDING DIALYSIS: ICD-10-CM

## 2020-10-21 DIAGNOSIS — Z79.4 TYPE 2 DIABETES MELLITUS WITH HYPERGLYCEMIA, WITH LONG-TERM CURRENT USE OF INSULIN: ICD-10-CM

## 2020-10-21 DIAGNOSIS — I10 HYPERTENSION, UNSPECIFIED TYPE: ICD-10-CM

## 2020-10-21 DIAGNOSIS — N18.6 ESRD (END STAGE RENAL DISEASE): ICD-10-CM

## 2020-10-21 DIAGNOSIS — D64.9 ANEMIA, UNSPECIFIED TYPE: ICD-10-CM

## 2020-10-21 DIAGNOSIS — N18.6 ESRD NEEDING DIALYSIS: ICD-10-CM

## 2020-10-21 DIAGNOSIS — E11.65 TYPE 2 DIABETES MELLITUS WITH HYPERGLYCEMIA, WITH LONG-TERM CURRENT USE OF INSULIN: ICD-10-CM

## 2020-10-21 DIAGNOSIS — E21.3 HYPERPARATHYROIDISM: ICD-10-CM

## 2020-10-21 LAB
ALBUMIN SERPL BCP-MCNC: 3.8 G/DL (ref 3.5–5.2)
ALP SERPL-CCNC: 64 U/L (ref 55–135)
ALT SERPL W/O P-5'-P-CCNC: 8 U/L (ref 10–44)
ANION GAP SERPL CALC-SCNC: 16 MMOL/L (ref 8–16)
AST SERPL-CCNC: 10 U/L (ref 10–40)
BASOPHILS # BLD AUTO: 0.02 K/UL (ref 0–0.2)
BASOPHILS NFR BLD: 0.4 % (ref 0–1.9)
BILIRUB SERPL-MCNC: 0.3 MG/DL (ref 0.1–1)
BUN SERPL-MCNC: 148 MG/DL (ref 8–23)
CALCIUM SERPL-MCNC: 9.1 MG/DL (ref 8.7–10.5)
CHLORIDE SERPL-SCNC: 102 MMOL/L (ref 95–110)
CO2 SERPL-SCNC: 19 MMOL/L (ref 23–29)
CREAT SERPL-MCNC: 7.4 MG/DL (ref 0.5–1.4)
DIFFERENTIAL METHOD: ABNORMAL
EOSINOPHIL # BLD AUTO: 0 K/UL (ref 0–0.5)
EOSINOPHIL NFR BLD: 0.6 % (ref 0–8)
ERYTHROCYTE [DISTWIDTH] IN BLOOD BY AUTOMATED COUNT: 13.9 % (ref 11.5–14.5)
EST. GFR  (AFRICAN AMERICAN): 6 ML/MIN/1.73 M^2
EST. GFR  (NON AFRICAN AMERICAN): 5 ML/MIN/1.73 M^2
GLUCOSE SERPL-MCNC: 404 MG/DL (ref 70–110)
HCT VFR BLD AUTO: 28.7 % (ref 37–48.5)
HGB BLD-MCNC: 9.2 G/DL (ref 12–16)
IMM GRANULOCYTES # BLD AUTO: 0.02 K/UL (ref 0–0.04)
IMM GRANULOCYTES NFR BLD AUTO: 0.4 % (ref 0–0.5)
LYMPHOCYTES # BLD AUTO: 1 K/UL (ref 1–4.8)
LYMPHOCYTES NFR BLD: 18 % (ref 18–48)
MCH RBC QN AUTO: 29.3 PG (ref 27–31)
MCHC RBC AUTO-ENTMCNC: 32.1 G/DL (ref 32–36)
MCV RBC AUTO: 91 FL (ref 82–98)
MONOCYTES # BLD AUTO: 0.3 K/UL (ref 0.3–1)
MONOCYTES NFR BLD: 5.9 % (ref 4–15)
NEUTROPHILS # BLD AUTO: 4.1 K/UL (ref 1.8–7.7)
NEUTROPHILS NFR BLD: 74.7 % (ref 38–73)
NRBC BLD-RTO: 0 /100 WBC
PLATELET # BLD AUTO: 188 K/UL (ref 150–350)
PMV BLD AUTO: 9.9 FL (ref 9.2–12.9)
POTASSIUM SERPL-SCNC: 4.7 MMOL/L (ref 3.5–5.1)
PROT SERPL-MCNC: 7.1 G/DL (ref 6–8.4)
RBC # BLD AUTO: 3.14 M/UL (ref 4–5.4)
SARS-COV-2 RDRP RESP QL NAA+PROBE: NEGATIVE
SODIUM SERPL-SCNC: 137 MMOL/L (ref 136–145)
WBC # BLD AUTO: 5.45 K/UL (ref 3.9–12.7)

## 2020-10-21 PROCEDURE — 80053 COMPREHEN METABOLIC PANEL: CPT | Mod: HCNC

## 2020-10-21 PROCEDURE — 99285 EMERGENCY DEPT VISIT HI MDM: CPT | Mod: 25,HCNC

## 2020-10-21 PROCEDURE — U0002 COVID-19 LAB TEST NON-CDC: HCPCS | Mod: HCNC

## 2020-10-21 PROCEDURE — 11000001 HC ACUTE MED/SURG PRIVATE ROOM: Mod: HCNC

## 2020-10-21 PROCEDURE — 86704 HEP B CORE ANTIBODY TOTAL: CPT | Mod: HCNC

## 2020-10-21 PROCEDURE — 83036 HEMOGLOBIN GLYCOSYLATED A1C: CPT | Mod: HCNC

## 2020-10-21 PROCEDURE — 80100014 HC HEMODIALYSIS 1:1: Mod: HCNC

## 2020-10-21 PROCEDURE — 87340 HEPATITIS B SURFACE AG IA: CPT | Mod: HCNC

## 2020-10-21 PROCEDURE — 36415 COLL VENOUS BLD VENIPUNCTURE: CPT | Mod: HCNC

## 2020-10-21 PROCEDURE — 86706 HEP B SURFACE ANTIBODY: CPT | Mod: HCNC

## 2020-10-21 PROCEDURE — 86706 HEP B SURFACE ANTIBODY: CPT | Mod: 91,HCNC

## 2020-10-21 PROCEDURE — 85025 COMPLETE CBC W/AUTO DIFF WBC: CPT | Mod: HCNC

## 2020-10-21 PROCEDURE — 93010 EKG 12-LEAD: ICD-10-PCS | Mod: HCNC,,, | Performed by: INTERNAL MEDICINE

## 2020-10-21 PROCEDURE — 93005 ELECTROCARDIOGRAM TRACING: CPT | Mod: HCNC

## 2020-10-21 PROCEDURE — 93010 ELECTROCARDIOGRAM REPORT: CPT | Mod: HCNC,,, | Performed by: INTERNAL MEDICINE

## 2020-10-21 RX ORDER — GUAIFENESIN 100 MG/5ML
200 SOLUTION ORAL EVERY 4 HOURS PRN
Status: DISCONTINUED | OUTPATIENT
Start: 2020-10-21 | End: 2020-10-24 | Stop reason: HOSPADM

## 2020-10-21 RX ORDER — SODIUM BICARBONATE 325 MG/1
325 TABLET ORAL 3 TIMES DAILY
Status: DISCONTINUED | OUTPATIENT
Start: 2020-10-21 | End: 2020-10-22

## 2020-10-21 RX ORDER — FLUTICASONE PROPIONATE 50 MCG
1 SPRAY, SUSPENSION (ML) NASAL DAILY
Status: DISCONTINUED | OUTPATIENT
Start: 2020-10-22 | End: 2020-10-24 | Stop reason: HOSPADM

## 2020-10-21 RX ORDER — DIPHENHYDRAMINE HCL 25 MG
25 CAPSULE ORAL EVERY 6 HOURS PRN
Status: DISCONTINUED | OUTPATIENT
Start: 2020-10-21 | End: 2020-10-24 | Stop reason: HOSPADM

## 2020-10-21 RX ORDER — ISOSORBIDE DINITRATE AND HYDRALAZINE HYDROCHLORIDE 37.5; 2 MG/1; MG/1
2 TABLET ORAL 3 TIMES DAILY
Status: DISCONTINUED | OUTPATIENT
Start: 2020-10-21 | End: 2020-10-24 | Stop reason: HOSPADM

## 2020-10-21 RX ORDER — CALCITRIOL 0.25 UG/1
0.25 CAPSULE ORAL DAILY
Status: DISCONTINUED | OUTPATIENT
Start: 2020-10-22 | End: 2020-10-24 | Stop reason: HOSPADM

## 2020-10-21 RX ORDER — CLOPIDOGREL BISULFATE 75 MG/1
75 TABLET ORAL DAILY
Status: DISCONTINUED | OUTPATIENT
Start: 2020-10-22 | End: 2020-10-24 | Stop reason: HOSPADM

## 2020-10-21 RX ORDER — INSULIN ASPART 100 [IU]/ML
0-5 INJECTION, SOLUTION INTRAVENOUS; SUBCUTANEOUS
Status: DISCONTINUED | OUTPATIENT
Start: 2020-10-21 | End: 2020-10-24 | Stop reason: HOSPADM

## 2020-10-21 RX ORDER — ONDANSETRON 2 MG/ML
4 INJECTION INTRAMUSCULAR; INTRAVENOUS EVERY 8 HOURS PRN
Status: DISCONTINUED | OUTPATIENT
Start: 2020-10-21 | End: 2020-10-24 | Stop reason: HOSPADM

## 2020-10-21 RX ORDER — METOPROLOL TARTRATE 50 MG/1
50 TABLET ORAL 2 TIMES DAILY
Status: DISCONTINUED | OUTPATIENT
Start: 2020-10-21 | End: 2020-10-24 | Stop reason: HOSPADM

## 2020-10-21 RX ORDER — SODIUM CHLORIDE 9 MG/ML
INJECTION, SOLUTION INTRAVENOUS
Status: DISCONTINUED | OUTPATIENT
Start: 2020-10-21 | End: 2020-10-24 | Stop reason: HOSPADM

## 2020-10-21 RX ORDER — AMLODIPINE BESYLATE 10 MG/1
10 TABLET ORAL DAILY
Status: DISCONTINUED | OUTPATIENT
Start: 2020-10-22 | End: 2020-10-22

## 2020-10-21 RX ORDER — LABETALOL HYDROCHLORIDE 5 MG/ML
10 INJECTION, SOLUTION INTRAVENOUS EVERY 6 HOURS PRN
Status: DISCONTINUED | OUTPATIENT
Start: 2020-10-21 | End: 2020-10-24 | Stop reason: HOSPADM

## 2020-10-21 RX ORDER — IBUPROFEN 200 MG
24 TABLET ORAL
Status: DISCONTINUED | OUTPATIENT
Start: 2020-10-21 | End: 2020-10-24 | Stop reason: HOSPADM

## 2020-10-21 RX ORDER — ATORVASTATIN CALCIUM 40 MG/1
40 TABLET, FILM COATED ORAL DAILY
Status: DISCONTINUED | OUTPATIENT
Start: 2020-10-22 | End: 2020-10-24 | Stop reason: HOSPADM

## 2020-10-21 RX ORDER — ACETAMINOPHEN 325 MG/1
650 TABLET ORAL EVERY 6 HOURS PRN
Status: DISCONTINUED | OUTPATIENT
Start: 2020-10-21 | End: 2020-10-24 | Stop reason: HOSPADM

## 2020-10-21 RX ORDER — IPRATROPIUM BROMIDE AND ALBUTEROL SULFATE 2.5; .5 MG/3ML; MG/3ML
3 SOLUTION RESPIRATORY (INHALATION) EVERY 4 HOURS PRN
Status: DISCONTINUED | OUTPATIENT
Start: 2020-10-21 | End: 2020-10-24 | Stop reason: HOSPADM

## 2020-10-21 RX ORDER — GLUCAGON 1 MG
1 KIT INJECTION
Status: DISCONTINUED | OUTPATIENT
Start: 2020-10-21 | End: 2020-10-24 | Stop reason: HOSPADM

## 2020-10-21 RX ORDER — IBUPROFEN 200 MG
16 TABLET ORAL
Status: DISCONTINUED | OUTPATIENT
Start: 2020-10-21 | End: 2020-10-24 | Stop reason: HOSPADM

## 2020-10-21 RX ORDER — ASPIRIN 81 MG/1
81 TABLET ORAL DAILY
Status: DISCONTINUED | OUTPATIENT
Start: 2020-10-22 | End: 2020-10-24 | Stop reason: HOSPADM

## 2020-10-21 NOTE — TELEPHONE ENCOUNTER
Contacted pt about test CV ultrasound, pt vu      ----- Message from Demetrio Mejia MD sent at 10/21/2020  8:36 AM CDT -----  Please call the patient regarding her result. Mild to moderate carotid artery disease will continue to monitor.

## 2020-10-21 NOTE — ED PROVIDER NOTES
SCRIBE #1 NOTE: I, Nae Robles, am scribing for, and in the presence of, Bernardino Gutierrez MD. I have scribed the entire note.       History     Chief Complaint   Patient presents with    Emergency Dialysis     Sent by Dr. Hanson to start dialysis today for initial. LUE AVF placed in April 2020     Review of patient's allergies indicates:   Allergen Reactions    Codeine Swelling    Darvon [propoxyphene] Swelling         History of Present Illness     HPI    10/21/2020, 4:39 PM  History obtained from the patient      History of Present Illness: Avril Monzon is a 75 y.o. female patient with a PMHx of CKD, anemia, CAD, HTN, HLD, Heart failure, and acute hypoxemic respiratory failure, seizures, and stroke, who presents to the Emergency Department for a need of an emergency dialysis. Pt was sent by Dr. Hanson to start dialysis today for initial. LUE AVF placed in April 2020. No associated sxs reported. Patient denies any fever, cough, chills, CP, SOB, n/v/d, weakness, dizziness, and all other sxs at this time. No further complaints or concerns at this time.       Arrival mode: Personal vehicle     PCP: Rose Parks MD        Past Medical History:  Past Medical History:   Diagnosis Date    Acute hypoxemic respiratory failure 11/26/2018    Anemia     Arthritis     back, hand, knees    Back pain     Cataract     CKD stage G4/A3, GFR 15 - 29 and albumin creatinine ratio >300 mg/g     GFR 40% Jan 2014 and 33% ub 3/2014 ([facility])    Coronary artery disease involving native coronary artery of native heart 11/30/2018    Diabetic retinopathy     DM (diabetes mellitus) type II controlled, neurological manifestation     Exudative age-related macular degeneration of left eye with active choroidal neovascularization 2/5/2019    GERD (gastroesophageal reflux disease)     Glaucoma     Heart failure     Hyperlipidemia     Hypertension     Non-ST elevation MI (NSTEMI) 11/28/2018    NSTEMI (non-ST elevated  myocardial infarction) 2018    Peripheral neuropathy     Polyneuropathy     Proteinuria     >6 mo     Seizures     [facility] 2014 -dick CT NRI showed small vessel    Stroke ,    Tobacco dependence     Type 2 diabetes with peripheral circulatory disorder, controlled        Past Surgical History:  Past Surgical History:   Procedure Laterality Date    BREAST LUMPECTOMY Left     benign, when patient was 15 years old    BREAST MASS EXCISION      ,benign, age 15.    CATHETERIZATION OF BOTH LEFT AND RIGHT HEART N/A 2018    Procedure: CATHETERIZATION, HEART, BOTH LEFT AND RIGHT;  Surgeon: Michelle Holman MD;  Location: [facility] CATH LAB;  Service: Cardiology;  Laterality: N/A;    CATHETERIZATION OF BOTH LEFT AND RIGHT HEART N/A 2018    Procedure: CATHETERIZATION, HEART, BOTH LEFT AND RIGHT;  Surgeon: Michelle Holman MD;  Location: [facility] CATH LAB;  Service: Cardiology;  Laterality: N/A;    HYSTERECTOMY  1982    INSERTION OF SHUNT      LEFT HEART CATHETERIZATION Left 12/3/2018    Procedure: CATHETERIZATION, HEART, LEFT;  Surgeon: Michelle Holman MD;  Location: [facility] CATH LAB;  Service: Cardiology;  Laterality: Left;  LAD ATHERECTOMY STENT/ FEMORAL APPROACH    OOPHORECTOMY      wrist cyst Right 1980s    dorsal wrist cyst         Family History:  Family History   Problem Relation Age of Onset    Diabetes Mother     Hyperlipidemia Mother     Hypertension Mother     Heart disease Mother         MI    Muscular dystrophy Son     Cancer Maternal Grandmother         colon       Social History:  Social History     Tobacco Use    Smoking status: Former Smoker     Packs/day: 1.50     Years: 30.00     Pack years: 45.00     Types: Cigarettes     Quit date: 1990     Years since quittin.8    Smokeless tobacco: Former User   Substance and Sexual Activity    Alcohol use: No     Alcohol/week: 0.0 standard drinks    Drug use: No    Sexual activity: Never        Review of Systems     Review of  Systems   Constitutional: Negative for chills and fever.   HENT: Negative for sore throat.    Respiratory: Negative for cough and shortness of breath.    Cardiovascular: Negative for chest pain.   Gastrointestinal: Negative for diarrhea, nausea and vomiting.   Genitourinary: Negative for dysuria.   Musculoskeletal: Negative for back pain.   Skin: Negative for rash.   Neurological: Negative for dizziness and weakness.   Hematological: Does not bruise/bleed easily.   All other systems reviewed and are negative.       Physical Exam     Initial Vitals   BP Pulse Resp Temp SpO2   10/21/20 1625 10/21/20 1624 10/21/20 1624 10/21/20 1624 10/21/20 1625   (!) 191/74 77 20 98.3 °F (36.8 °C) 95 %      MAP       --                 Physical Exam  Nursing Notes and Vital Signs Reviewed.  Constitutional: Patient is in no apparent distress. Well-developed and well-nourished.  Head: Atraumatic. Normocephalic.  Eyes: PERRL. EOM intact. Conjunctivae are not pale. No scleral icterus.  ENT: Mucous membranes are moist. Oropharynx is clear and symmetric.    Neck: Supple. Full ROM. No lymphadenopathy.  Cardiovascular: Regular rate. Regular rhythm. No murmurs, rubs, or gallops. Distal pulses are 2+ and symmetric. Palpable thrill in LUE shunt.  Pulmonary/Chest: No respiratory distress. Clear to auscultation bilaterally. No wheezing or rales.  Abdominal: Soft and non-distended.  There is no tenderness.  No rebound, guarding, or rigidity. Good bowel sounds.  Genitourinary: No CVA tenderness  Musculoskeletal: Moves all extremities. No obvious deformities. No edema. No calf tenderness.  Skin: Warm and dry.  Neurological:  Alert, awake, and appropriate.  Normal speech.  No acute focal neurological deficits are appreciated.  Psychiatric: Normal affect. Good eye contact. Appropriate in content.     ED Course   Procedures  ED Vital Signs:  Vitals:    10/21/20 1624 10/21/20 1625 10/21/20 1646 10/21/20 1703   BP:  (!) 191/74 (!) 195/82 (!) 193/83  "  Pulse: 77  75 70   Resp: 20      Temp: 98.3 °F (36.8 °C)      TempSrc: Oral      SpO2:  95% 100% 100%   Weight: 58.6 kg (129 lb 1.3 oz)      Height: 5' 5" (1.651 m)       10/21/20 1731 10/21/20 1802   BP: (!) 170/77 (!) 177/77   Pulse: 70 70   Resp: (!) 24 19   Temp:     TempSrc:     SpO2: 100% 100%   Weight:     Height:         Abnormal Lab Results:  Labs Reviewed   CBC W/ AUTO DIFFERENTIAL - Abnormal; Notable for the following components:       Result Value    RBC 3.14 (*)     Hemoglobin 9.2 (*)     Hematocrit 28.7 (*)     Gran% 74.7 (*)     All other components within normal limits   COMPREHENSIVE METABOLIC PANEL - Abnormal; Notable for the following components:    CO2 19 (*)     Glucose 404 (*)     BUN, Bld 148 (*)     Creatinine 7.4 (*)     ALT 8 (*)     eGFR if  6 (*)     eGFR if non  5 (*)     All other components within normal limits   SARS-COV-2 RNA AMPLIFICATION, QUAL        All Lab Results:  Results for orders placed or performed during the hospital encounter of 10/21/20   CBC auto differential   Result Value Ref Range    WBC 5.45 3.90 - 12.70 K/uL    RBC 3.14 (L) 4.00 - 5.40 M/uL    Hemoglobin 9.2 (L) 12.0 - 16.0 g/dL    Hematocrit 28.7 (L) 37.0 - 48.5 %    Mean Corpuscular Volume 91 82 - 98 fL    Mean Corpuscular Hemoglobin 29.3 27.0 - 31.0 pg    Mean Corpuscular Hemoglobin Conc 32.1 32.0 - 36.0 g/dL    RDW 13.9 11.5 - 14.5 %    Platelets 188 150 - 350 K/uL    MPV 9.9 9.2 - 12.9 fL    Immature Granulocytes 0.4 0.0 - 0.5 %    Gran # (ANC) 4.1 1.8 - 7.7 K/uL    Immature Grans (Abs) 0.02 0.00 - 0.04 K/uL    Lymph # 1.0 1.0 - 4.8 K/uL    Mono # 0.3 0.3 - 1.0 K/uL    Eos # 0.0 0.0 - 0.5 K/uL    Baso # 0.02 0.00 - 0.20 K/uL    nRBC 0 0 /100 WBC    Gran% 74.7 (H) 38.0 - 73.0 %    Lymph% 18.0 18.0 - 48.0 %    Mono% 5.9 4.0 - 15.0 %    Eosinophil% 0.6 0.0 - 8.0 %    Basophil% 0.4 0.0 - 1.9 %    Differential Method Automated    Comprehensive Metabolic Panel   Result Value Ref " Range    Sodium 137 136 - 145 mmol/L    Potassium 4.7 3.5 - 5.1 mmol/L    Chloride 102 95 - 110 mmol/L    CO2 19 (L) 23 - 29 mmol/L    Glucose 404 (H) 70 - 110 mg/dL    BUN, Bld 148 (H) 8 - 23 mg/dL    Creatinine 7.4 (H) 0.5 - 1.4 mg/dL    Calcium 9.1 8.7 - 10.5 mg/dL    Total Protein 7.1 6.0 - 8.4 g/dL    Albumin 3.8 3.5 - 5.2 g/dL    Total Bilirubin 0.3 0.1 - 1.0 mg/dL    Alkaline Phosphatase 64 55 - 135 U/L    AST 10 10 - 40 U/L    ALT 8 (L) 10 - 44 U/L    Anion Gap 16 8 - 16 mmol/L    eGFR if African American 6 (A) >60 mL/min/1.73 m^2    eGFR if non African American 5 (A) >60 mL/min/1.73 m^2   COVID-19 Rapid Screening   Result Value Ref Range    SARS-CoV-2 RNA, Amplification, Qual Negative Negative     *Note: Due to a large number of results and/or encounters for the requested time period, some results have not been displayed. A complete set of results can be found in Results Review.         Imaging Results:  Imaging Results          X-Ray Chest 1 View (Final result)  Result time 10/21/20 17:33:33    Final result by Yoel Ferrer MD (10/21/20 17:33:33)                 Impression:      No acute abnormality.      Electronically signed by: Yoel Ferrer  Date:    10/21/2020  Time:    17:33             Narrative:    EXAMINATION:  XR CHEST 1 VIEW    CLINICAL HISTORY:  encounter for dialysis;    TECHNIQUE:  Single frontal view of the chest was performed.    COMPARISON:  None    FINDINGS:  The lungs are clear, with normal appearance of pulmonary vasculature and no pleural effusion or pneumothorax.    The cardiac silhouette is normal in size. There is aortic atherosclerosis.    Bones are intact.                                 The EKG was ordered, reviewed, and independently interpreted by the ED provider.  Interpretation time: 17:11  Rate: 73 BPM  Rhythm: normal sinus rhythm  Interpretation: Minimal voltage criteria for LVH, may be normal variant. No STEMI.           The Emergency Provider reviewed the vital signs  and test results, which are outlined above.     ED Discussion       6:10 PM: Discussed case with Patricia Lora NP ([facility]). Dr. Olivarez agrees with current care and management of pt and accepts admission.   Admitting Service: Hospital Medicine  Admitting Physician: Dr. Olivarez  Admit to: [facility]    6:10 PM: Re-evaluated pt. I have discussed test results, shared treatment plan, and the need for admission with patient at bedside. Pt expresses understanding at this time and agree with all information. All questions answered. Pt has no further questions or concerns at this time. Pt is ready for admit.         Medical Decision Making:   Clinical Tests:   Lab Tests: Ordered and Reviewed  Radiological Study: Ordered and Reviewed  Medical Tests: Ordered and Reviewed           ED Medication(s):  Medications - No data to display    New Prescriptions    No medications on file               Scribe Attestation:   Scribe #1: I performed the above scribed service and the documentation accurately describes the services I performed. I attest to the accuracy of the note.     Attending:   Physician Attestation Statement for Scribe #1: I, Bernardino Gutierrez MD, personally performed the services described in this documentation, as scribed by Nae Robles, in my presence, and it is both accurate and complete.           Clinical Impression       ICD-10-CM ICD-9-CM   1. Anemia, unspecified type  D64.9 285.9   2. Encounter for dialysis  Z99.2 V56.0       Disposition:   Disposition: Placed in Observation  Condition: Fair         Bernardino Gutierrez MD  10/21/20 9835

## 2020-10-22 PROBLEM — I10 HYPERTENSION: Status: ACTIVE | Noted: 2019-04-05

## 2020-10-22 LAB
ALBUMIN SERPL BCP-MCNC: 3.6 G/DL (ref 3.5–5.2)
ANION GAP SERPL CALC-SCNC: 14 MMOL/L (ref 8–16)
BASOPHILS # BLD AUTO: 0.02 K/UL (ref 0–0.2)
BASOPHILS NFR BLD: 0.4 % (ref 0–1.9)
BUN SERPL-MCNC: 91 MG/DL (ref 8–23)
CALCIUM SERPL-MCNC: 8.9 MG/DL (ref 8.7–10.5)
CHLORIDE SERPL-SCNC: 106 MMOL/L (ref 95–110)
CO2 SERPL-SCNC: 22 MMOL/L (ref 23–29)
CREAT SERPL-MCNC: 5.2 MG/DL (ref 0.5–1.4)
DIFFERENTIAL METHOD: ABNORMAL
EOSINOPHIL # BLD AUTO: 0.1 K/UL (ref 0–0.5)
EOSINOPHIL NFR BLD: 1.1 % (ref 0–8)
ERYTHROCYTE [DISTWIDTH] IN BLOOD BY AUTOMATED COUNT: 13.7 % (ref 11.5–14.5)
EST. GFR  (AFRICAN AMERICAN): 9 ML/MIN/1.73 M^2
EST. GFR  (NON AFRICAN AMERICAN): 8 ML/MIN/1.73 M^2
ESTIMATED AVG GLUCOSE: 220 MG/DL (ref 68–131)
GLUCOSE SERPL-MCNC: 120 MG/DL (ref 70–110)
HBA1C MFR BLD HPLC: 9.3 % (ref 4–5.6)
HBV CORE AB SERPL QL IA: NEGATIVE
HBV SURFACE AB SER-ACNC: NEGATIVE M[IU]/ML
HBV SURFACE AG SERPL QL IA: NEGATIVE
HCT VFR BLD AUTO: 28.3 % (ref 37–48.5)
HGB BLD-MCNC: 9.3 G/DL (ref 12–16)
IMM GRANULOCYTES # BLD AUTO: 0.01 K/UL (ref 0–0.04)
IMM GRANULOCYTES NFR BLD AUTO: 0.2 % (ref 0–0.5)
LYMPHOCYTES # BLD AUTO: 1.5 K/UL (ref 1–4.8)
LYMPHOCYTES NFR BLD: 27.8 % (ref 18–48)
MAGNESIUM SERPL-MCNC: 1.9 MG/DL (ref 1.6–2.6)
MCH RBC QN AUTO: 29.5 PG (ref 27–31)
MCHC RBC AUTO-ENTMCNC: 32.9 G/DL (ref 32–36)
MCV RBC AUTO: 90 FL (ref 82–98)
MONOCYTES # BLD AUTO: 0.4 K/UL (ref 0.3–1)
MONOCYTES NFR BLD: 7.2 % (ref 4–15)
NEUTROPHILS # BLD AUTO: 3.3 K/UL (ref 1.8–7.7)
NEUTROPHILS NFR BLD: 63.3 % (ref 38–73)
NRBC BLD-RTO: 0 /100 WBC
PHOSPHATE SERPL-MCNC: 6 MG/DL (ref 2.7–4.5)
PLATELET # BLD AUTO: 210 K/UL (ref 150–350)
PMV BLD AUTO: 10.3 FL (ref 9.2–12.9)
POCT GLUCOSE: 110 MG/DL (ref 70–110)
POCT GLUCOSE: 179 MG/DL (ref 70–110)
POCT GLUCOSE: 218 MG/DL (ref 70–110)
POCT GLUCOSE: 280 MG/DL (ref 70–110)
POTASSIUM SERPL-SCNC: 4.3 MMOL/L (ref 3.5–5.1)
RBC # BLD AUTO: 3.15 M/UL (ref 4–5.4)
SODIUM SERPL-SCNC: 142 MMOL/L (ref 136–145)
WBC # BLD AUTO: 5.26 K/UL (ref 3.9–12.7)

## 2020-10-22 PROCEDURE — C9399 UNCLASSIFIED DRUGS OR BIOLOG: HCPCS | Mod: HCNC | Performed by: INTERNAL MEDICINE

## 2020-10-22 PROCEDURE — 25000242 PHARM REV CODE 250 ALT 637 W/ HCPCS: Mod: HCNC | Performed by: INTERNAL MEDICINE

## 2020-10-22 PROCEDURE — 63600175 PHARM REV CODE 636 W HCPCS: Mod: TB,HCNC | Performed by: INTERNAL MEDICINE

## 2020-10-22 PROCEDURE — 21400001 HC TELEMETRY ROOM: Mod: HCNC

## 2020-10-22 PROCEDURE — 80069 RENAL FUNCTION PANEL: CPT | Mod: HCNC

## 2020-10-22 PROCEDURE — 63600175 PHARM REV CODE 636 W HCPCS: Mod: HCNC | Performed by: INTERNAL MEDICINE

## 2020-10-22 PROCEDURE — 85025 COMPLETE CBC W/AUTO DIFF WBC: CPT | Mod: HCNC

## 2020-10-22 PROCEDURE — 83735 ASSAY OF MAGNESIUM: CPT | Mod: HCNC

## 2020-10-22 PROCEDURE — 96372 THER/PROPH/DIAG INJ SC/IM: CPT | Mod: HCNC

## 2020-10-22 PROCEDURE — 99222 1ST HOSP IP/OBS MODERATE 55: CPT | Mod: 25,HCNC,, | Performed by: INTERNAL MEDICINE

## 2020-10-22 PROCEDURE — 11000001 HC ACUTE MED/SURG PRIVATE ROOM: Mod: HCNC

## 2020-10-22 PROCEDURE — 25000003 PHARM REV CODE 250: Mod: HCNC | Performed by: INTERNAL MEDICINE

## 2020-10-22 PROCEDURE — 80100014 HC HEMODIALYSIS 1:1: Mod: HCNC

## 2020-10-22 PROCEDURE — 36415 COLL VENOUS BLD VENIPUNCTURE: CPT | Mod: HCNC

## 2020-10-22 PROCEDURE — 90935 HEMODIALYSIS ONE EVALUATION: CPT | Mod: HCNC,,, | Performed by: INTERNAL MEDICINE

## 2020-10-22 PROCEDURE — 99222 PR INITIAL HOSPITAL CARE,LEVL II: ICD-10-PCS | Mod: 25,HCNC,, | Performed by: INTERNAL MEDICINE

## 2020-10-22 PROCEDURE — 90935 PR HEMODIALYSIS, ONE EVALUATION: ICD-10-PCS | Mod: HCNC,,, | Performed by: INTERNAL MEDICINE

## 2020-10-22 RX ORDER — AMLODIPINE BESYLATE 5 MG/1
5 TABLET ORAL DAILY
Status: DISCONTINUED | OUTPATIENT
Start: 2020-10-23 | End: 2020-10-24 | Stop reason: HOSPADM

## 2020-10-22 RX ORDER — SEVELAMER CARBONATE 800 MG/1
800 TABLET, FILM COATED ORAL
Status: DISCONTINUED | OUTPATIENT
Start: 2020-10-22 | End: 2020-10-24 | Stop reason: HOSPADM

## 2020-10-22 RX ADMIN — CLOPIDOGREL 75 MG: 75 TABLET, FILM COATED ORAL at 09:10

## 2020-10-22 RX ADMIN — HYDRALAZINE HYDROCHLORIDE AND ISOSORBIDE DINITRATE 2 TABLET: 37.5; 2 TABLET, FILM COATED ORAL at 02:10

## 2020-10-22 RX ADMIN — DIPHENHYDRAMINE HYDROCHLORIDE 25 MG: 25 CAPSULE ORAL at 12:10

## 2020-10-22 RX ADMIN — EPOETIN ALFA-EPBX 10000 UNITS: 10000 INJECTION, SOLUTION INTRAVENOUS; SUBCUTANEOUS at 11:10

## 2020-10-22 RX ADMIN — ATORVASTATIN CALCIUM 40 MG: 40 TABLET, FILM COATED ORAL at 09:10

## 2020-10-22 RX ADMIN — CALCITRIOL CAPSULES 0.25 MCG 0.25 MCG: 0.25 CAPSULE ORAL at 09:10

## 2020-10-22 RX ADMIN — METOPROLOL TARTRATE 50 MG: 50 TABLET, FILM COATED ORAL at 08:10

## 2020-10-22 RX ADMIN — HYDRALAZINE HYDROCHLORIDE AND ISOSORBIDE DINITRATE 2 TABLET: 37.5; 2 TABLET, FILM COATED ORAL at 12:10

## 2020-10-22 RX ADMIN — ASPIRIN 81 MG: 81 TABLET, COATED ORAL at 09:10

## 2020-10-22 RX ADMIN — ACETAMINOPHEN 650 MG: 325 TABLET ORAL at 12:10

## 2020-10-22 RX ADMIN — FLUTICASONE PROPIONATE 50 MCG: 50 SPRAY, METERED NASAL at 09:10

## 2020-10-22 RX ADMIN — INSULIN DETEMIR 20 UNITS: 100 INJECTION, SOLUTION SUBCUTANEOUS at 12:10

## 2020-10-22 RX ADMIN — HYDRALAZINE HYDROCHLORIDE AND ISOSORBIDE DINITRATE 2 TABLET: 37.5; 2 TABLET, FILM COATED ORAL at 08:10

## 2020-10-22 RX ADMIN — SEVELAMER CARBONATE 800 MG: 800 TABLET, FILM COATED ORAL at 05:10

## 2020-10-22 RX ADMIN — SODIUM BICARBONATE 325 MG: 325 TABLET ORAL at 09:10

## 2020-10-22 RX ADMIN — NEPHROCAP 1 CAPSULE: 1 CAP ORAL at 12:10

## 2020-10-22 RX ADMIN — ACETAMINOPHEN 650 MG: 325 TABLET ORAL at 07:10

## 2020-10-22 RX ADMIN — SODIUM BICARBONATE 325 MG: 325 TABLET ORAL at 12:10

## 2020-10-22 RX ADMIN — INSULIN DETEMIR 20 UNITS: 100 INJECTION, SOLUTION SUBCUTANEOUS at 09:10

## 2020-10-22 RX ADMIN — INSULIN ASPART 1 UNITS: 100 INJECTION, SOLUTION INTRAVENOUS; SUBCUTANEOUS at 09:10

## 2020-10-22 RX ADMIN — METOPROLOL TARTRATE 50 MG: 50 TABLET, FILM COATED ORAL at 12:10

## 2020-10-22 NOTE — SUBJECTIVE & OBJECTIVE
Past Medical History:   Diagnosis Date    Acute hypoxemic respiratory failure 11/26/2018    Anemia     Arthritis     back, hand, knees    Back pain     Cataract     CKD stage G4/A3, GFR 15 - 29 and albumin creatinine ratio >300 mg/g     GFR 40% Jan 2014 and 33% ub 3/2014 ([facility])    Coronary artery disease involving native coronary artery of native heart 11/30/2018    Diabetic retinopathy     DM (diabetes mellitus) type II controlled, neurological manifestation     Exudative age-related macular degeneration of left eye with active choroidal neovascularization 2/5/2019    GERD (gastroesophageal reflux disease)     Glaucoma     Heart failure     Hyperlipidemia     Hypertension     Non-ST elevation MI (NSTEMI) 11/28/2018    NSTEMI (non-ST elevated myocardial infarction) 11/27/2018    Peripheral neuropathy     Polyneuropathy     Proteinuria     >6 mo     Seizures     [facility] 1/2014 -dick CT NRI showed small vessel    Stroke 2012,2014    Tobacco dependence     Type 2 diabetes with peripheral circulatory disorder, controlled        Past Surgical History:   Procedure Laterality Date    BREAST LUMPECTOMY Left     benign, when patient was 15 years old    BREAST MASS EXCISION      ,benign, age 15.    CATHETERIZATION OF BOTH LEFT AND RIGHT HEART N/A 11/28/2018    Procedure: CATHETERIZATION, HEART, BOTH LEFT AND RIGHT;  Surgeon: Michelle Holman MD;  Location: [facility] CATH LAB;  Service: Cardiology;  Laterality: N/A;    CATHETERIZATION OF BOTH LEFT AND RIGHT HEART N/A 11/30/2018    Procedure: CATHETERIZATION, HEART, BOTH LEFT AND RIGHT;  Surgeon: Michelle Holman MD;  Location: [facility] CATH LAB;  Service: Cardiology;  Laterality: N/A;    HYSTERECTOMY  1982    INSERTION OF SHUNT      LEFT HEART CATHETERIZATION Left 12/3/2018    Procedure: CATHETERIZATION, HEART, LEFT;  Surgeon: Michelle Holman MD;  Location: [facility] CATH LAB;  Service: Cardiology;  Laterality: Left;  LAD ATHERECTOMY STENT/ FEMORAL APPROACH     OOPHORECTOMY      wrist cyst Right 1980s    dorsal wrist cyst       Review of patient's allergies indicates:   Allergen Reactions    Codeine Swelling    Darvon [propoxyphene] Swelling     Current Facility-Administered Medications   Medication Frequency    0.9%  NaCl infusion PRN    acetaminophen tablet 650 mg Q6H PRN    albuterol-ipratropium 2.5 mg-0.5 mg/3 mL nebulizer solution 3 mL Q4H PRN    [START ON 10/23/2020] amLODIPine tablet 5 mg Daily    aspirin EC tablet 81 mg Daily    atorvastatin tablet 40 mg Daily    calcitRIOL capsule 0.25 mcg Daily    clopidogreL tablet 75 mg Daily    dextrose 50% injection 12.5 g PRN    dextrose 50% injection 25 g PRN    diphenhydrAMINE capsule 25 mg Q6H PRN    fluticasone propionate 50 mcg/actuation nasal spray 50 mcg Daily    glucagon (human recombinant) injection 1 mg PRN    glucose chewable tablet 16 g PRN    glucose chewable tablet 24 g PRN    guaifenesin 100 mg/5 ml syrup 200 mg Q4H PRN    insulin aspart U-100 pen 0-5 Units QID (AC + HS) PRN    insulin detemir U-100 pen 20 Units QHS    isosorbide-hydrALAZINE 20-37.5 mg per tablet 2 tablet TID    labetaloL injection 10 mg Q6H PRN    metoprolol tartrate (LOPRESSOR) tablet 50 mg BID    ondansetron injection 4 mg Q8H PRN    vitamin renal formula (B-complex-vitamin c-folic acid) 1 mg per capsule 1 capsule Daily     Family History     Problem Relation (Age of Onset)    Cancer Maternal Grandmother    Diabetes Mother    Heart disease Mother    Hyperlipidemia Mother    Hypertension Mother    Muscular dystrophy Son        Tobacco Use    Smoking status: Former Smoker     Packs/day: 1.50     Years: 30.00     Pack years: 45.00     Types: Cigarettes     Quit date: 1990     Years since quittin.8    Smokeless tobacco: Former User   Substance and Sexual Activity    Alcohol use: No     Alcohol/week: 0.0 standard drinks    Drug use: No    Sexual activity: Never     Review of Systems   Constitutional:  Positive for activity change and fatigue. Negative for appetite change and fever.   HENT: Negative for congestion, facial swelling, sore throat, trouble swallowing and voice change.    Eyes: Positive for visual disturbance. Negative for redness.   Respiratory: Negative for apnea, cough, chest tightness, shortness of breath and wheezing.    Cardiovascular: Negative for chest pain, palpitations and leg swelling.   Gastrointestinal: Negative for abdominal distention, abdominal pain, blood in stool, constipation, diarrhea, nausea and vomiting.   Genitourinary: Negative for decreased urine volume, difficulty urinating, dysuria, flank pain, frequency, hematuria, pelvic pain and urgency.   Musculoskeletal: Negative for back pain, gait problem and joint swelling.   Skin: Negative for color change and rash.   Neurological: Positive for weakness. Negative for dizziness, syncope and headaches.   Hematological: Does not bruise/bleed easily.   Psychiatric/Behavioral: Negative for agitation, behavioral problems and confusion. The patient is not nervous/anxious.      Objective:     Vital Signs (Most Recent):  Temp: 98.5 °F (36.9 °C) (10/22/20 1011)  Pulse: 67 (10/22/20 1200)  Resp: 18 (10/22/20 1011)  BP: (!) 90/39 (10/22/20 1200)  SpO2: 99 % (10/22/20 0700)  O2 Device (Oxygen Therapy): room air (10/22/20 0700) Vital Signs (24h Range):  Temp:  [97.9 °F (36.6 °C)-98.5 °F (36.9 °C)] 98.5 °F (36.9 °C)  Pulse:  [63-77] 67  Resp:  [17-24] 18  SpO2:  [95 %-100 %] 99 %  BP: ()/(39-83) 90/39     Weight: 58.6 kg (129 lb 1.3 oz) (10/21/20 1624)  Body mass index is 21.48 kg/m².  Body surface area is 1.64 meters squared.    I/O last 3 completed shifts:  In: -   Out: 500 [Other:500]    Physical Exam  Constitutional:       General: She is not in acute distress.     Appearance: She is well-developed.   HENT:      Head: Normocephalic and atraumatic.      Mouth/Throat:      Pharynx: No oropharyngeal exudate.   Eyes:      General: No scleral  icterus.     Conjunctiva/sclera: Conjunctivae normal.   Neck:      Musculoskeletal: Neck supple.      Thyroid: No thyroid mass or thyromegaly.      Vascular: No carotid bruit or JVD.      Trachea: No tracheal deviation.   Cardiovascular:      Rate and Rhythm: Normal rate and regular rhythm.      Heart sounds: Normal heart sounds. No murmur. No friction rub. No gallop.    Pulmonary:      Effort: Pulmonary effort is normal. No respiratory distress.      Breath sounds: Normal breath sounds. No wheezing or rales.   Chest:      Chest wall: No tenderness.   Abdominal:      General: Bowel sounds are normal. There is no distension or abdominal bruit.      Palpations: Abdomen is soft. There is no mass.      Tenderness: There is no abdominal tenderness. There is no guarding or rebound.   Musculoskeletal:         General: No tenderness.      Comments: VERÓNICA EPPS   Lymphadenopathy:      Cervical: No cervical adenopathy.   Skin:     General: Skin is warm.      Coloration: Skin is not pale.      Findings: No erythema or rash.   Neurological:      Mental Status: She is alert and oriented to person, place, and time.      Cranial Nerves: No cranial nerve deficit.      Motor: No abnormal muscle tone.      Coordination: Coordination normal.   Psychiatric:         Behavior: Behavior normal.         Judgment: Judgment normal.         Significant Labs:  CBC:   Recent Labs   Lab 10/22/20  0709   WBC 5.26   RBC 3.15*   HGB 9.3*   HCT 28.3*      MCV 90   MCH 29.5   MCHC 32.9     CMP:   Recent Labs   Lab 10/21/20  1710 10/22/20  0709   * 120*   CALCIUM 9.1 8.9   ALBUMIN 3.8 3.6   PROT 7.1  --     142   K 4.7 4.3   CO2 19* 22*    106   * 91*   CREATININE 7.4* 5.2*   ALKPHOS 64  --    ALT 8*  --    AST 10  --    BILITOT 0.3  --      Coagulation: No results for input(s): PT, INR, APTT in the last 168 hours.  LFTs:   Recent Labs   Lab 10/21/20  1710 10/22/20  0709   ALT 8*  --    AST 10  --    ALKPHOS 64  --     BILITOT 0.3  --    PROT 7.1  --    ALBUMIN 3.8 3.6     All labs within the past 24 hours have been reviewed.    Significant Imaging:  Reviewed    Lab Results   Component Value Date    .0 (H) 04/08/2020    CALCIUM 8.9 10/22/2020    PHOS 6.0 (H) 10/22/2020       Lab Results   Component Value Date    ALBUMIN 3.6 10/22/2020

## 2020-10-22 NOTE — SUBJECTIVE & OBJECTIVE
Past Medical History:   Diagnosis Date    Acute hypoxemic respiratory failure 11/26/2018    Anemia     Arthritis     back, hand, knees    Back pain     Cataract     CKD stage G4/A3, GFR 15 - 29 and albumin creatinine ratio >300 mg/g     GFR 40% Jan 2014 and 33% ub 3/2014 (BRG)    Coronary artery disease involving native coronary artery of native heart 11/30/2018    Diabetic retinopathy     DM (diabetes mellitus) type II controlled, neurological manifestation     Exudative age-related macular degeneration of left eye with active choroidal neovascularization 2/5/2019    GERD (gastroesophageal reflux disease)     Glaucoma     Heart failure     Hyperlipidemia     Hypertension     Non-ST elevation MI (NSTEMI) 11/28/2018    NSTEMI (non-ST elevated myocardial infarction) 11/27/2018    Peripheral neuropathy     Polyneuropathy     Proteinuria     >6 mo     Seizures     [facility]G 1/2014 -dick CT NRI showed small vessel    Stroke 2012,2014    Tobacco dependence     Type 2 diabetes with peripheral circulatory disorder, controlled        Past Surgical History:   Procedure Laterality Date    BREAST LUMPECTOMY Left     benign, when patient was 15 years old    BREAST MASS EXCISION      ,benign, age 15.    CATHETERIZATION OF BOTH LEFT AND RIGHT HEART N/A 11/28/2018    Procedure: CATHETERIZATION, HEART, BOTH LEFT AND RIGHT;  Surgeon: Michelle Holman MD;  Location: [facility] CATH LAB;  Service: Cardiology;  Laterality: N/A;    CATHETERIZATION OF BOTH LEFT AND RIGHT HEART N/A 11/30/2018    Procedure: CATHETERIZATION, HEART, BOTH LEFT AND RIGHT;  Surgeon: Michelle Holman MD;  Location: [facility] CATH LAB;  Service: Cardiology;  Laterality: N/A;    HYSTERECTOMY  1982    INSERTION OF SHUNT      LEFT HEART CATHETERIZATION Left 12/3/2018    Procedure: CATHETERIZATION, HEART, LEFT;  Surgeon: Michelle Holman MD;  Location: [facility] CATH LAB;  Service: Cardiology;  Laterality: Left;  LAD ATHERECTOMY STENT/ FEMORAL APPROACH     OOPHORECTOMY      wrist cyst Right 1980s    dorsal wrist cyst       Review of patient's allergies indicates:   Allergen Reactions    Codeine Swelling    Darvon [propoxyphene] Swelling       No current facility-administered medications on file prior to encounter.      Current Outpatient Medications on File Prior to Encounter   Medication Sig    ACCU-CHEK ARDEN PLUS METER Misc USE TO TEST TWICE DAILY    amLODIPine (NORVASC) 10 MG tablet Take 1 tablet (10 mg total) by mouth once daily.    aspirin (ECOTRIN) 81 MG EC tablet Take 1 tablet (81 mg total) by mouth once daily.    atorvastatin (LIPITOR) 80 MG tablet Take 1 tablet (80 mg total) by mouth once daily. (Patient taking differently: Take 40 mg by mouth once daily. )    atropine 1% (ISOPTO ATROPINE) 1 % Drop Place 1 drop into the left eye 3 (three) times daily.    BIDIL 20-37.5 mg Tab TAKE 2 TABLETS BY MOUTH THREE TIMES DAILY    blood sugar diagnostic Strp 1 strip by Misc.(Non-Drug; Combo Route) route 3 (three) times daily. relion ultima    brimonidine-timoloL (COMBIGAN) 0.2-0.5 % Drop Place 1 drop into the left eye 2 (two) times a day.    calcitRIOL (ROCALTROL) 0.25 MCG Cap Take 1 capsule by mouth once daily    clopidogrel (PLAVIX) 75 mg tablet TAKE 1 TABLET BY MOUTH ONCE DAILY    fluticasone propionate (FLONASE) 50 mcg/actuation nasal spray 1 spray (50 mcg total) by Each Nostril route once daily.    furosemide (LASIX) 80 MG tablet Take 1 tablet (80 mg total) by mouth 2 (two) times daily.    gabapentin (NEURONTIN) 300 MG capsule Take 1 capsule (300 mg total) by mouth every evening.    insulin NPH-insulin regular, 70/30, (NOVOLIN 70/30) 100 unit/mL (70-30) injection INJECT SUBCUTANEOUSLY 30 UNITS IN THE MORNING AND INJECT 25 UNITS IN THE EVENING (Patient taking differently: INJECT SUBCUTANEOUSLY 30 UNITS IN THE MORNING AND INJECT 25 UNITS IN THE EVENING)    lancets (ACCU-CHEK SOFTCLIX LANCETS) Misc 1 lancet by Misc.(Non-Drug; Combo Route) route 3  (three) times daily.    loratadine (CLARITIN) 10 mg tablet Take 1 tablet (10 mg total) by mouth once daily.    metoprolol tartrate (LOPRESSOR) 25 MG tablet Take 1 tablet by mouth twice daily    metoprolol tartrate (LOPRESSOR) 50 MG tablet Take 1 tablet (50 mg total) by mouth 2 (two) times daily.    nitroGLYCERIN (NITROSTAT) 0.4 MG SL tablet Place 1 tablet (0.4 mg total) under the tongue every 5 (five) minutes as needed for Chest pain.    ondansetron (ZOFRAN-ODT) 4 MG TbDL DISSOLVE 1 TABLET IN MOUTH THREE TIMES DAILY AS NEEDED FOR NAUSEA FOR 5 DAYS    sodium bicarbonate 325 MG tablet Take 1 tablet (325 mg total) by mouth 3 (three) times daily.    torsemide (DEMADEX) 20 MG Tab Take 2 tablets by mouth once daily    [DISCONTINUED] sodium bicarbonate 325 MG tablet TAKE 1 TABLET BY MOUTH THREE TIMES DAILY     Family History     Problem Relation (Age of Onset)    Cancer Maternal Grandmother    Diabetes Mother    Heart disease Mother    Hyperlipidemia Mother    Hypertension Mother    Muscular dystrophy Son        Tobacco Use    Smoking status: Former Smoker     Packs/day: 1.50     Years: 30.00     Pack years: 45.00     Types: Cigarettes     Quit date: 1990     Years since quittin.8    Smokeless tobacco: Former User   Substance and Sexual Activity    Alcohol use: No     Alcohol/week: 0.0 standard drinks    Drug use: No    Sexual activity: Never     Review of Systems   Constitutional: Positive for appetite change and fatigue. Negative for chills, diaphoresis and fever.   HENT: Negative.  Negative for congestion, nosebleeds and sinus pressure.    Eyes: Positive for visual disturbance (blind right eye).   Respiratory: Negative.  Negative for cough, chest tightness, shortness of breath and wheezing.    Cardiovascular: Negative.  Negative for chest pain, palpitations and leg swelling.   Gastrointestinal: Negative.  Negative for abdominal pain, diarrhea, nausea and vomiting.   Endocrine: Negative.  Negative  for polyuria.   Genitourinary: Negative.  Negative for dysuria and urgency.   Musculoskeletal: Negative.  Negative for back pain, joint swelling and neck stiffness.   Skin: Negative.  Negative for color change, pallor and rash.   Allergic/Immunologic: Negative.  Negative for immunocompromised state.   Neurological: Negative.  Negative for dizziness, syncope, speech difficulty, numbness and headaches.   Hematological: Negative.    Psychiatric/Behavioral: Positive for decreased concentration. Negative for confusion and hallucinations. The patient is not nervous/anxious.    All other systems reviewed and are negative.    Objective:     Vital Signs (Most Recent):  Temp: 98.3 °F (36.8 °C) (10/21/20 1624)  Pulse: 70 (10/21/20 1802)  Resp: 19 (10/21/20 1802)  BP: (!) 177/77 (10/21/20 1802)  SpO2: 100 % (10/21/20 1802) Vital Signs (24h Range):  Temp:  [98.3 °F (36.8 °C)] 98.3 °F (36.8 °C)  Pulse:  [70-77] 70  Resp:  [19-24] 19  SpO2:  [95 %-100 %] 100 %  BP: (170-195)/(74-83) 177/77     Weight: 58.6 kg (129 lb 1.3 oz)  Body mass index is 21.48 kg/m².    Physical Exam  Vitals signs and nursing note reviewed.   Constitutional:       General: She is not in acute distress.     Appearance: Normal appearance. She is well-developed. She is not diaphoretic.   HENT:      Head: Normocephalic and atraumatic.   Eyes:      General: No scleral icterus.     Conjunctiva/sclera: Conjunctivae normal.   Neck:      Musculoskeletal: Normal range of motion and neck supple.      Thyroid: No thyromegaly.      Vascular: No JVD.      Trachea: No tracheal deviation.   Cardiovascular:      Rate and Rhythm: Normal rate and regular rhythm.      Heart sounds: Normal heart sounds. No murmur.   Pulmonary:      Effort: Pulmonary effort is normal. No respiratory distress.      Breath sounds: Normal breath sounds.   Chest:      Chest wall: No tenderness.   Abdominal:      General: There is no distension.      Palpations: Abdomen is soft.      Tenderness:  There is no abdominal tenderness.   Musculoskeletal: Normal range of motion.         General: No swelling or tenderness.   Lymphadenopathy:      Cervical: No cervical adenopathy.   Skin:     General: Skin is warm and dry.      Coloration: Skin is not jaundiced.      Findings: No erythema.   Neurological:      General: No focal deficit present.      Mental Status: She is alert and oriented to person, place, and time. Mental status is at baseline.      Cranial Nerves: No cranial nerve deficit.      Motor: No abnormal muscle tone.      Coordination: Coordination normal.   Psychiatric:         Mood and Affect: Mood normal.         Behavior: Behavior normal.             Significant Labs:   Results for orders placed or performed during the hospital encounter of 10/21/20   CBC auto differential   Result Value Ref Range    WBC 5.45 3.90 - 12.70 K/uL    RBC 3.14 (L) 4.00 - 5.40 M/uL    Hemoglobin 9.2 (L) 12.0 - 16.0 g/dL    Hematocrit 28.7 (L) 37.0 - 48.5 %    Mean Corpuscular Volume 91 82 - 98 fL    Mean Corpuscular Hemoglobin 29.3 27.0 - 31.0 pg    Mean Corpuscular Hemoglobin Conc 32.1 32.0 - 36.0 g/dL    RDW 13.9 11.5 - 14.5 %    Platelets 188 150 - 350 K/uL    MPV 9.9 9.2 - 12.9 fL    Immature Granulocytes 0.4 0.0 - 0.5 %    Gran # (ANC) 4.1 1.8 - 7.7 K/uL    Immature Grans (Abs) 0.02 0.00 - 0.04 K/uL    Lymph # 1.0 1.0 - 4.8 K/uL    Mono # 0.3 0.3 - 1.0 K/uL    Eos # 0.0 0.0 - 0.5 K/uL    Baso # 0.02 0.00 - 0.20 K/uL    nRBC 0 0 /100 WBC    Gran% 74.7 (H) 38.0 - 73.0 %    Lymph% 18.0 18.0 - 48.0 %    Mono% 5.9 4.0 - 15.0 %    Eosinophil% 0.6 0.0 - 8.0 %    Basophil% 0.4 0.0 - 1.9 %    Differential Method Automated    Comprehensive Metabolic Panel   Result Value Ref Range    Sodium 137 136 - 145 mmol/L    Potassium 4.7 3.5 - 5.1 mmol/L    Chloride 102 95 - 110 mmol/L    CO2 19 (L) 23 - 29 mmol/L    Glucose 404 (H) 70 - 110 mg/dL    BUN, Bld 148 (H) 8 - 23 mg/dL    Creatinine 7.4 (H) 0.5 - 1.4 mg/dL    Calcium 9.1 8.7 -  10.5 mg/dL    Total Protein 7.1 6.0 - 8.4 g/dL    Albumin 3.8 3.5 - 5.2 g/dL    Total Bilirubin 0.3 0.1 - 1.0 mg/dL    Alkaline Phosphatase 64 55 - 135 U/L    AST 10 10 - 40 U/L    ALT 8 (L) 10 - 44 U/L    Anion Gap 16 8 - 16 mmol/L    eGFR if African American 6 (A) >60 mL/min/1.73 m^2    eGFR if non African American 5 (A) >60 mL/min/1.73 m^2   COVID-19 Rapid Screening   Result Value Ref Range    SARS-CoV-2 RNA, Amplification, Qual Negative Negative     *Note: Due to a large number of results and/or encounters for the requested time period, some results have not been displayed. A complete set of results can be found in Results Review.         Significant Imaging:   Imaging Results          X-Ray Chest 1 View (Final result)  Result time 10/21/20 17:33:33    Final result by Yoel Ferrer MD (10/21/20 17:33:33)                 Impression:      No acute abnormality.      Electronically signed by: Yoel Ferrer  Date:    10/21/2020  Time:    17:33             Narrative:    EXAMINATION:  XR CHEST 1 VIEW    CLINICAL HISTORY:  encounter for dialysis;    TECHNIQUE:  Single frontal view of the chest was performed.    COMPARISON:  None    FINDINGS:  The lungs are clear, with normal appearance of pulmonary vasculature and no pleural effusion or pneumothorax.    The cardiac silhouette is normal in size. There is aortic atherosclerosis.    Bones are intact.                                I have independently reviewed all pertinent labs within the past 24 hours.    I have independently reviewed, visualized and interpreted all pertinent imaging results within the past 24 hours and discussed the findings with the ED physician, Dr. Gutierrez

## 2020-10-22 NOTE — CONSULTS
[facility] -   Nephrology  Consult Note      Patient Name: Avril Monzon  MRN: [id number]  Admission Date: 10/21/2020  Hospital Length of Stay: 1 days  Attending Provider: Fletcher Herrera MD   Primary Care Physician: Rose Parks MD  Principal Problem:ESRD needing dialysis    Consults  Subjective:     HPI: Avril Monzon is a pleasant 75-year-old  woman with history of hypertension, type 2 diabetes, diabetic nephropathy, chronic kidney disease stage 5 due to diabetes and hypertension, diabetic retinopathy, was admitted to the hospital electively last night will initiate hemodialysis, she is status post LUE AVF , Dr White , 4/2020, dialysis was initiated due to progressive weakness, she had 1st dialysis treatment last evening, tolerated well, no issues with the access, patient denies any chest pain or shortness of breath,    Past Medical History:   Diagnosis Date    Acute hypoxemic respiratory failure 11/26/2018    Anemia     Arthritis     back, hand, knees    Back pain     Cataract     CKD stage G4/A3, GFR 15 - 29 and albumin creatinine ratio >300 mg/g     GFR 40% Jan 2014 and 33% ub 3/2014 ([facility])    Coronary artery disease involving native coronary artery of native heart 11/30/2018    Diabetic retinopathy     DM (diabetes mellitus) type II controlled, neurological manifestation     Exudative age-related macular degeneration of left eye with active choroidal neovascularization 2/5/2019    GERD (gastroesophageal reflux disease)     Glaucoma     Heart failure     Hyperlipidemia     Hypertension     Non-ST elevation MI (NSTEMI) 11/28/2018    NSTEMI (non-ST elevated myocardial infarction) 11/27/2018    Peripheral neuropathy     Polyneuropathy     Proteinuria     >6 mo     Seizures     BRG 1/2014 -dick CT NRI showed small vessel    Stroke 2012,2014    Tobacco dependence     Type 2 diabetes with peripheral circulatory disorder, controlled        Past Surgical  History:   Procedure Laterality Date    BREAST LUMPECTOMY Left     benign, when patient was 15 years old    BREAST MASS EXCISION      ,benign, age 15.    CATHETERIZATION OF BOTH LEFT AND RIGHT HEART N/A 11/28/2018    Procedure: CATHETERIZATION, HEART, BOTH LEFT AND RIGHT;  Surgeon: Michelle Holman MD;  Location: [facility] CATH LAB;  Service: Cardiology;  Laterality: N/A;    CATHETERIZATION OF BOTH LEFT AND RIGHT HEART N/A 11/30/2018    Procedure: CATHETERIZATION, HEART, BOTH LEFT AND RIGHT;  Surgeon: Michelle Holman MD;  Location: [facility] CATH LAB;  Service: Cardiology;  Laterality: N/A;    HYSTERECTOMY  1982    INSERTION OF SHUNT      LEFT HEART CATHETERIZATION Left 12/3/2018    Procedure: CATHETERIZATION, HEART, LEFT;  Surgeon: Michelle Holman MD;  Location: [facility] CATH LAB;  Service: Cardiology;  Laterality: Left;  LAD ATHERECTOMY STENT/ FEMORAL APPROACH    OOPHORECTOMY      wrist cyst Right 1980s    dorsal wrist cyst       Review of patient's allergies indicates:   Allergen Reactions    Codeine Swelling    Darvon [propoxyphene] Swelling     Current Facility-Administered Medications   Medication Frequency    0.9%  NaCl infusion PRN    acetaminophen tablet 650 mg Q6H PRN    albuterol-ipratropium 2.5 mg-0.5 mg/3 mL nebulizer solution 3 mL Q4H PRN    [START ON 10/23/2020] amLODIPine tablet 5 mg Daily    aspirin EC tablet 81 mg Daily    atorvastatin tablet 40 mg Daily    calcitRIOL capsule 0.25 mcg Daily    clopidogreL tablet 75 mg Daily    dextrose 50% injection 12.5 g PRN    dextrose 50% injection 25 g PRN    diphenhydrAMINE capsule 25 mg Q6H PRN    fluticasone propionate 50 mcg/actuation nasal spray 50 mcg Daily    glucagon (human recombinant) injection 1 mg PRN    glucose chewable tablet 16 g PRN    glucose chewable tablet 24 g PRN    guaifenesin 100 mg/5 ml syrup 200 mg Q4H PRN    insulin aspart U-100 pen 0-5 Units QID (AC + HS) PRN    insulin detemir U-100 pen 20 Units QHS     isosorbide-hydrALAZINE 20-37.5 mg per tablet 2 tablet TID    labetaloL injection 10 mg Q6H PRN    metoprolol tartrate (LOPRESSOR) tablet 50 mg BID    ondansetron injection 4 mg Q8H PRN    vitamin renal formula (B-complex-vitamin c-folic acid) 1 mg per capsule 1 capsule Daily     Family History     Problem Relation (Age of Onset)    Cancer Maternal Grandmother    Diabetes Mother    Heart disease Mother    Hyperlipidemia Mother    Hypertension Mother    Muscular dystrophy Son        Tobacco Use    Smoking status: Former Smoker     Packs/day: 1.50     Years: 30.00     Pack years: 45.00     Types: Cigarettes     Quit date: 1990     Years since quittin.8    Smokeless tobacco: Former User   Substance and Sexual Activity    Alcohol use: No     Alcohol/week: 0.0 standard drinks    Drug use: No    Sexual activity: Never     Review of Systems   Constitutional: Positive for activity change and fatigue. Negative for appetite change and fever.   HENT: Negative for congestion, facial swelling, sore throat, trouble swallowing and voice change.    Eyes: Positive for visual disturbance. Negative for redness.   Respiratory: Negative for apnea, cough, chest tightness, shortness of breath and wheezing.    Cardiovascular: Negative for chest pain, palpitations and leg swelling.   Gastrointestinal: Negative for abdominal distention, abdominal pain, blood in stool, constipation, diarrhea, nausea and vomiting.   Genitourinary: Negative for decreased urine volume, difficulty urinating, dysuria, flank pain, frequency, hematuria, pelvic pain and urgency.   Musculoskeletal: Negative for back pain, gait problem and joint swelling.   Skin: Negative for color change and rash.   Neurological: Positive for weakness. Negative for dizziness, syncope and headaches.   Hematological: Does not bruise/bleed easily.   Psychiatric/Behavioral: Negative for agitation, behavioral problems and confusion. The patient is not nervous/anxious.       Objective:     Vital Signs (Most Recent):  Temp: 98.5 °F (36.9 °C) (10/22/20 1011)  Pulse: 67 (10/22/20 1200)  Resp: 18 (10/22/20 1011)  BP: (!) 90/39 (10/22/20 1200)  SpO2: 99 % (10/22/20 0700)  O2 Device (Oxygen Therapy): room air (10/22/20 0700) Vital Signs (24h Range):  Temp:  [97.9 °F (36.6 °C)-98.5 °F (36.9 °C)] 98.5 °F (36.9 °C)  Pulse:  [63-77] 67  Resp:  [17-24] 18  SpO2:  [95 %-100 %] 99 %  BP: ()/(39-83) 90/39     Weight: 58.6 kg (129 lb 1.3 oz) (10/21/20 1624)  Body mass index is 21.48 kg/m².  Body surface area is 1.64 meters squared.    I/O last 3 completed shifts:  In: -   Out: 500 [Other:500]    Physical Exam  Constitutional:       General: She is not in acute distress.     Appearance: She is well-developed.   HENT:      Head: Normocephalic and atraumatic.      Mouth/Throat:      Pharynx: No oropharyngeal exudate.   Eyes:      General: No scleral icterus.     Conjunctiva/sclera: Conjunctivae normal.   Neck:      Musculoskeletal: Neck supple.      Thyroid: No thyroid mass or thyromegaly.      Vascular: No carotid bruit or JVD.      Trachea: No tracheal deviation.   Cardiovascular:      Rate and Rhythm: Normal rate and regular rhythm.      Heart sounds: Normal heart sounds. No murmur. No friction rub. No gallop.    Pulmonary:      Effort: Pulmonary effort is normal. No respiratory distress.      Breath sounds: Normal breath sounds. No wheezing or rales.   Chest:      Chest wall: No tenderness.   Abdominal:      General: Bowel sounds are normal. There is no distension or abdominal bruit.      Palpations: Abdomen is soft. There is no mass.      Tenderness: There is no abdominal tenderness. There is no guarding or rebound.   Musculoskeletal:         General: No tenderness.      Comments: LUE AVF   Lymphadenopathy:      Cervical: No cervical adenopathy.   Skin:     General: Skin is warm.      Coloration: Skin is not pale.      Findings: No erythema or rash.   Neurological:      Mental Status: She  is alert and oriented to person, place, and time.      Cranial Nerves: No cranial nerve deficit.      Motor: No abnormal muscle tone.      Coordination: Coordination normal.   Psychiatric:         Behavior: Behavior normal.         Judgment: Judgment normal.         Significant Labs:  CBC:   Recent Labs   Lab 10/22/20  0709   WBC 5.26   RBC 3.15*   HGB 9.3*   HCT 28.3*      MCV 90   MCH 29.5   MCHC 32.9     CMP:   Recent Labs   Lab 10/21/20  1710 10/22/20  0709   * 120*   CALCIUM 9.1 8.9   ALBUMIN 3.8 3.6   PROT 7.1  --     142   K 4.7 4.3   CO2 19* 22*    106   * 91*   CREATININE 7.4* 5.2*   ALKPHOS 64  --    ALT 8*  --    AST 10  --    BILITOT 0.3  --      Coagulation: No results for input(s): PT, INR, APTT in the last 168 hours.  LFTs:   Recent Labs   Lab 10/21/20  1710 10/22/20  0709   ALT 8*  --    AST 10  --    ALKPHOS 64  --    BILITOT 0.3  --    PROT 7.1  --    ALBUMIN 3.8 3.6     All labs within the past 24 hours have been reviewed.    Significant Imaging:  Reviewed    Lab Results   Component Value Date    .0 (H) 04/08/2020    CALCIUM 8.9 10/22/2020    PHOS 6.0 (H) 10/22/2020       Lab Results   Component Value Date    ALBUMIN 3.6 10/22/2020           Assessment/Plan:     * ESRD needing dialysis  1. ESRD , due to hypertension type 2 diabetes, diabetic nephropathy, patient was admitted to initiate hemodialysis, today her 2nd dialysis treatment seen at dialysis, tolerating well, VERÓNICA AVF, Dr White, and we will consult case management for outpatient dialysis arrangements will plan third dialysis treatment     2.  Hypertension, blood pressure on the low side, will decrease dose of the medications,    3.  Anemia chronic kidney disease, continue Procrit on dialysis,    4.  Secondary hyperparathyroidism, continue calcitriol will start Renvela with meals,    5.  Type 2 diabetes, management Medicine Team,    6.  Disposition, d.c home when clinically stable             Thank  you for your consult. I will follow-up with patient. Please contact us if you have any additional questions.     Total time spent 60 minutes including time needed to review the records,  patient  evaluation, documentation, face-to-face discussion with the patient,  primary team,   more than 50% of the time was spent on coordination of care and counseling.       Leonard Bryan MD   Nephrology  [facility]

## 2020-10-22 NOTE — ASSESSMENT & PLAN NOTE
1. ESRD , due to hypertension type 2 diabetes, diabetic nephropathy, patient was admitted to initiate hemodialysis, today her 2nd dialysis treatment seen at dialysis, tolerating well, VERÓNICA AVF, Dr White, and we will consult case management for outpatient dialysis arrangements will plan third dialysis treatment     2.  Hypertension, blood pressure on the low side, will decrease dose of the medications,    3.  Anemia chronic kidney disease, continue Procrit on dialysis,    4.  Secondary hyperparathyroidism, continue calcitriol will start Renvela with meals,    5.  Type 2 diabetes, management Medicine Team,    6.  Disposition, d.c home when clinically stable

## 2020-10-22 NOTE — ASSESSMENT & PLAN NOTE
Blood sugar elevated at 404.  Anion gap 16, no evidence of DKA.  Patient takes 30 units NPH at home.  Will reduce dose to Levemir 20 units nightly.  Moderate dose insulin sliding scale.  Monitor blood sugars closely and make adjustments as needed.

## 2020-10-22 NOTE — PLAN OF CARE
Swer met with pt at bedside for initial assessment. Pt lives with daughter and was independent with ADLs prior to admission. Pt's daughter will provide transportation at discharge. Pt had home health in the past, but does not remember name of company. Pt denied having medical equipment. Pt does not have a problem obtaining medication and daughter provides transportation to medical appointments. Pt does receive dialysis. Pt does not have an advanced directive at this time. SWer provided a transitional care folder, information on advanced directives, information on pharmacy bedside delivery, and discharge planning begins on admission with contact information for any needs/questions.    PCP: Rose Parks MD  Pharm;   [facility] [address] [place] [address] [facility]  [address] [facility] [address]  Phone: [phone] Fax: [phone]    [facility] [address] [place] [address]  [address] [facility]  [address] [facility] [address]  Phone: [phone] Fax: [phone]  My Chart; Pending        10/22/20 1442   Discharge Assessment   Assessment Type Discharge Planning Assessment   Confirmed/corrected address and phone number on facesheet? Yes   Assessment information obtained from? Patient   Communicated expected length of stay with patient/caregiver yes   Prior to hospitilization cognitive status: Alert/Oriented   Prior to hospitalization functional status: Independent   Current cognitive status: Alert/Oriented   Current Functional Status: Independent   Facility Arrived From: home   Lives With child(robert), adult   Able to Return to Prior Arrangements yes   Is patient able to care for self after discharge? Yes   Who are your caregiver(s) and their phone number(s)? Elizabeth Monzon (Daughter) [phone]   Patient's perception of discharge disposition home or selfcare   Readmission Within the Last 30 Days no previous admission in last 30 days   Patient currently being  followed by outpatient case management? No   Patient currently receives any other outside agency services? No   Equipment Currently Used at Home none   Do you have any problems affording any of your prescribed medications? No   Is the patient taking medications as prescribed? yes   Does the patient have transportation home? Yes   Transportation Anticipated family or friend will provide   Discharge Plan A Home with family   DME Needed Upon Discharge  none   Patient/Family in Agreement with Plan yes

## 2020-10-22 NOTE — PROGRESS NOTES
Patient received 2.5 hours of hemodialysis treatment. -Retacrit 10,000 units was given during Dialysis treatment. Net UF 0 mls removed. Dr. Bryan visited bedside during dialysis treatment. No issues access noted.

## 2020-10-22 NOTE — PLAN OF CARE
Patient remained free from injury. HD dialysis tomorrow. Blood glucose monitored. No s/s of acute distress. 12hr chart check complete.

## 2020-10-22 NOTE — NURSING
Dr Herrera notiifed of patient blood pressure 113/54 and is on dialysis this am also bp meds due of norvasc, Isordil/Hydralazine and Metoprolol, he was notified not given at this time.

## 2020-10-22 NOTE — HPI
Avril Monzon is a pleasant 75-year-old  woman with history of hypertension, type 2 diabetes, diabetic nephropathy, chronic kidney disease stage 5 due to diabetes and hypertension, diabetic retinopathy, was admitted to the hospital electively last night will initiate hemodialysis, she is status post LUE AVF , Dr White , 4/2020, dialysis was initiated due to progressive weakness, she had 1st dialysis treatment last evening, tolerated well, no issues with the access, patient denies any chest pain or shortness of breath,

## 2020-10-22 NOTE — HPI
Ms. Monzon is a pleasant 75-year-old  female with PMH significant for insulin-dependent type 2 diabetes mellitus, CKD stage 5, secondary to nephrotic syndrome from diabetic nephropathy, was sent to the ED to be admitted for initiation of hemodialysis.  Patient had left upper extremity fistula placed in April 2020.  Patient presented to Dr. Hanson as nephrology clinic earlier today complaining of worsening fatigue, decreasing appetite.  Denies fever, chills, nausea vomiting.  Laboratory workup reveals , creatinine 7.4, glucose 404.  Hemoglobin 9.2.  Patient is scheduled to be initiated on hemodialysis later tonight.    Admitting diagnosis:  New onset ESRD, to be initiated on hemodialysis this hospitalization.

## 2020-10-22 NOTE — H&P
[facility] Hospital Medicine  History & Physical    Patient Name: Avril Monzon  MRN: [id number]  Admission Date: 10/21/2020  Attending Physician: Bernardino Gutierrez,*   Primary Care Provider: Rose Parks MD         Patient information was obtained from patient, past medical records and ER records.     Subjective:     Principal Problem:ESRD needing dialysis    Chief Complaint:   Chief Complaint   Patient presents with    Emergency Dialysis     Sent by Dr. Hanson to start dialysis today for initial. LUE AVF placed in April 2020        HPI: Ms. Monzon is a pleasant 75-year-old  female with PMH significant for insulin-dependent type 2 diabetes mellitus, CKD stage 5, secondary to nephrotic syndrome from diabetic nephropathy, was sent to the ED to be admitted for initiation of hemodialysis.  Patient had left upper extremity fistula placed in April 2020.  Patient presented to Dr. Hanson as nephrology clinic earlier today complaining of worsening fatigue, decreasing appetite.  Denies fever, chills, nausea vomiting.  Laboratory workup reveals , creatinine 7.4, glucose 404.  Hemoglobin 9.2.  Patient is scheduled to be initiated on hemodialysis later tonight.    Admitting diagnosis:  New onset ESRD, to be initiated on hemodialysis this hospitalization.      Past Medical History:   Diagnosis Date    Acute hypoxemic respiratory failure 11/26/2018    Anemia     Arthritis     back, hand, knees    Back pain     Cataract     CKD stage G4/A3, GFR 15 - 29 and albumin creatinine ratio >300 mg/g     GFR 40% Jan 2014 and 33% ub 3/2014 ([facility])    Coronary artery disease involving native coronary artery of native heart 11/30/2018    Diabetic retinopathy     DM (diabetes mellitus) type II controlled, neurological manifestation     Exudative age-related macular degeneration of left eye with active choroidal neovascularization 2/5/2019    GERD (gastroesophageal reflux disease)      Glaucoma     Heart failure     Hyperlipidemia     Hypertension     Non-ST elevation MI (NSTEMI) 11/28/2018    NSTEMI (non-ST elevated myocardial infarction) 11/27/2018    Peripheral neuropathy     Polyneuropathy     Proteinuria     >6 mo     Seizures     [facility] 1/2014 -dick CT NRI showed small vessel    Stroke 2012,2014    Tobacco dependence     Type 2 diabetes with peripheral circulatory disorder, controlled        Past Surgical History:   Procedure Laterality Date    BREAST LUMPECTOMY Left     benign, when patient was 15 years old    BREAST MASS EXCISION      ,benign, age 15.    CATHETERIZATION OF BOTH LEFT AND RIGHT HEART N/A 11/28/2018    Procedure: CATHETERIZATION, HEART, BOTH LEFT AND RIGHT;  Surgeon: Michelle Holman MD;  Location: [facility] CATH LAB;  Service: Cardiology;  Laterality: N/A;    CATHETERIZATION OF BOTH LEFT AND RIGHT HEART N/A 11/30/2018    Procedure: CATHETERIZATION, HEART, BOTH LEFT AND RIGHT;  Surgeon: Michelle Holman MD;  Location: [facility] CATH LAB;  Service: Cardiology;  Laterality: N/A;    HYSTERECTOMY  1982    INSERTION OF SHUNT      LEFT HEART CATHETERIZATION Left 12/3/2018    Procedure: CATHETERIZATION, HEART, LEFT;  Surgeon: Michelle Holman MD;  Location: [facility] CATH LAB;  Service: Cardiology;  Laterality: Left;  LAD ATHERECTOMY STENT/ FEMORAL APPROACH    OOPHORECTOMY      wrist cyst Right 1980s    dorsal wrist cyst       Review of patient's allergies indicates:   Allergen Reactions    Codeine Swelling    Darvon [propoxyphene] Swelling       No current facility-administered medications on file prior to encounter.      Current Outpatient Medications on File Prior to Encounter   Medication Sig    ACCU-CHEK ARDEN PLUS METER Misc USE TO TEST TWICE DAILY    amLODIPine (NORVASC) 10 MG tablet Take 1 tablet (10 mg total) by mouth once daily.    aspirin (ECOTRIN) 81 MG EC tablet Take 1 tablet (81 mg total) by mouth once daily.    atorvastatin (LIPITOR) 80 MG tablet Take 1 tablet  (80 mg total) by mouth once daily. (Patient taking differently: Take 40 mg by mouth once daily. )    atropine 1% (ISOPTO ATROPINE) 1 % Drop Place 1 drop into the left eye 3 (three) times daily.    BIDIL 20-37.5 mg Tab TAKE 2 TABLETS BY MOUTH THREE TIMES DAILY    blood sugar diagnostic Strp 1 strip by Misc.(Non-Drug; Combo Route) route 3 (three) times daily. relion ultima    brimonidine-timoloL (COMBIGAN) 0.2-0.5 % Drop Place 1 drop into the left eye 2 (two) times a day.    calcitRIOL (ROCALTROL) 0.25 MCG Cap Take 1 capsule by mouth once daily    clopidogrel (PLAVIX) 75 mg tablet TAKE 1 TABLET BY MOUTH ONCE DAILY    fluticasone propionate (FLONASE) 50 mcg/actuation nasal spray 1 spray (50 mcg total) by Each Nostril route once daily.    furosemide (LASIX) 80 MG tablet Take 1 tablet (80 mg total) by mouth 2 (two) times daily.    gabapentin (NEURONTIN) 300 MG capsule Take 1 capsule (300 mg total) by mouth every evening.    insulin NPH-insulin regular, 70/30, (NOVOLIN 70/30) 100 unit/mL (70-30) injection INJECT SUBCUTANEOUSLY 30 UNITS IN THE MORNING AND INJECT 25 UNITS IN THE EVENING (Patient taking differently: INJECT SUBCUTANEOUSLY 30 UNITS IN THE MORNING AND INJECT 25 UNITS IN THE EVENING)    lancets (ACCU-CHEK SOFTCLIX LANCETS) Misc 1 lancet by Misc.(Non-Drug; Combo Route) route 3 (three) times daily.    loratadine (CLARITIN) 10 mg tablet Take 1 tablet (10 mg total) by mouth once daily.    metoprolol tartrate (LOPRESSOR) 25 MG tablet Take 1 tablet by mouth twice daily    metoprolol tartrate (LOPRESSOR) 50 MG tablet Take 1 tablet (50 mg total) by mouth 2 (two) times daily.    nitroGLYCERIN (NITROSTAT) 0.4 MG SL tablet Place 1 tablet (0.4 mg total) under the tongue every 5 (five) minutes as needed for Chest pain.    ondansetron (ZOFRAN-ODT) 4 MG TbDL DISSOLVE 1 TABLET IN MOUTH THREE TIMES DAILY AS NEEDED FOR NAUSEA FOR 5 DAYS    sodium bicarbonate 325 MG tablet Take 1 tablet (325 mg total) by mouth 3  (three) times daily.    torsemide (DEMADEX) 20 MG Tab Take 2 tablets by mouth once daily    [DISCONTINUED] sodium bicarbonate 325 MG tablet TAKE 1 TABLET BY MOUTH THREE TIMES DAILY     Family History     Problem Relation (Age of Onset)    Cancer Maternal Grandmother    Diabetes Mother    Heart disease Mother    Hyperlipidemia Mother    Hypertension Mother    Muscular dystrophy Son        Tobacco Use    Smoking status: Former Smoker     Packs/day: 1.50     Years: 30.00     Pack years: 45.00     Types: Cigarettes     Quit date: 1990     Years since quittin.8    Smokeless tobacco: Former User   Substance and Sexual Activity    Alcohol use: No     Alcohol/week: 0.0 standard drinks    Drug use: No    Sexual activity: Never     Review of Systems   Constitutional: Positive for appetite change and fatigue. Negative for chills, diaphoresis and fever.   HENT: Negative.  Negative for congestion, nosebleeds and sinus pressure.    Eyes: Positive for visual disturbance (blind right eye).   Respiratory: Negative.  Negative for cough, chest tightness, shortness of breath and wheezing.    Cardiovascular: Negative.  Negative for chest pain, palpitations and leg swelling.   Gastrointestinal: Negative.  Negative for abdominal pain, diarrhea, nausea and vomiting.   Endocrine: Negative.  Negative for polyuria.   Genitourinary: Negative.  Negative for dysuria and urgency.   Musculoskeletal: Negative.  Negative for back pain, joint swelling and neck stiffness.   Skin: Negative.  Negative for color change, pallor and rash.   Allergic/Immunologic: Negative.  Negative for immunocompromised state.   Neurological: Negative.  Negative for dizziness, syncope, speech difficulty, numbness and headaches.   Hematological: Negative.    Psychiatric/Behavioral: Positive for decreased concentration. Negative for confusion and hallucinations. The patient is not nervous/anxious.    All other systems reviewed and are negative.    Objective:      Vital Signs (Most Recent):  Temp: 98.3 °F (36.8 °C) (10/21/20 1624)  Pulse: 70 (10/21/20 1802)  Resp: 19 (10/21/20 1802)  BP: (!) 177/77 (10/21/20 1802)  SpO2: 100 % (10/21/20 1802) Vital Signs (24h Range):  Temp:  [98.3 °F (36.8 °C)] 98.3 °F (36.8 °C)  Pulse:  [70-77] 70  Resp:  [19-24] 19  SpO2:  [95 %-100 %] 100 %  BP: (170-195)/(74-83) 177/77     Weight: 58.6 kg (129 lb 1.3 oz)  Body mass index is 21.48 kg/m².    Physical Exam  Vitals signs and nursing note reviewed.   Constitutional:       General: She is not in acute distress.     Appearance: Normal appearance. She is well-developed. She is not diaphoretic.   HENT:      Head: Normocephalic and atraumatic.   Eyes:      General: No scleral icterus.     Conjunctiva/sclera: Conjunctivae normal.   Neck:      Musculoskeletal: Normal range of motion and neck supple.      Thyroid: No thyromegaly.      Vascular: No JVD.      Trachea: No tracheal deviation.   Cardiovascular:      Rate and Rhythm: Normal rate and regular rhythm.      Heart sounds: Normal heart sounds. No murmur.   Pulmonary:      Effort: Pulmonary effort is normal. No respiratory distress.      Breath sounds: Normal breath sounds.   Chest:      Chest wall: No tenderness.   Abdominal:      General: There is no distension.      Palpations: Abdomen is soft.      Tenderness: There is no abdominal tenderness.   Musculoskeletal: Normal range of motion.         General: No swelling or tenderness.   Lymphadenopathy:      Cervical: No cervical adenopathy.   Skin:     General: Skin is warm and dry.      Coloration: Skin is not jaundiced.      Findings: No erythema.   Neurological:      General: No focal deficit present.      Mental Status: She is alert and oriented to person, place, and time. Mental status is at baseline.      Cranial Nerves: No cranial nerve deficit.      Motor: No abnormal muscle tone.      Coordination: Coordination normal.   Psychiatric:         Mood and Affect: Mood normal.          Behavior: Behavior normal.             Significant Labs:   Results for orders placed or performed during the hospital encounter of 10/21/20   CBC auto differential   Result Value Ref Range    WBC 5.45 3.90 - 12.70 K/uL    RBC 3.14 (L) 4.00 - 5.40 M/uL    Hemoglobin 9.2 (L) 12.0 - 16.0 g/dL    Hematocrit 28.7 (L) 37.0 - 48.5 %    Mean Corpuscular Volume 91 82 - 98 fL    Mean Corpuscular Hemoglobin 29.3 27.0 - 31.0 pg    Mean Corpuscular Hemoglobin Conc 32.1 32.0 - 36.0 g/dL    RDW 13.9 11.5 - 14.5 %    Platelets 188 150 - 350 K/uL    MPV 9.9 9.2 - 12.9 fL    Immature Granulocytes 0.4 0.0 - 0.5 %    Gran # (ANC) 4.1 1.8 - 7.7 K/uL    Immature Grans (Abs) 0.02 0.00 - 0.04 K/uL    Lymph # 1.0 1.0 - 4.8 K/uL    Mono # 0.3 0.3 - 1.0 K/uL    Eos # 0.0 0.0 - 0.5 K/uL    Baso # 0.02 0.00 - 0.20 K/uL    nRBC 0 0 /100 WBC    Gran% 74.7 (H) 38.0 - 73.0 %    Lymph% 18.0 18.0 - 48.0 %    Mono% 5.9 4.0 - 15.0 %    Eosinophil% 0.6 0.0 - 8.0 %    Basophil% 0.4 0.0 - 1.9 %    Differential Method Automated    Comprehensive Metabolic Panel   Result Value Ref Range    Sodium 137 136 - 145 mmol/L    Potassium 4.7 3.5 - 5.1 mmol/L    Chloride 102 95 - 110 mmol/L    CO2 19 (L) 23 - 29 mmol/L    Glucose 404 (H) 70 - 110 mg/dL    BUN, Bld 148 (H) 8 - 23 mg/dL    Creatinine 7.4 (H) 0.5 - 1.4 mg/dL    Calcium 9.1 8.7 - 10.5 mg/dL    Total Protein 7.1 6.0 - 8.4 g/dL    Albumin 3.8 3.5 - 5.2 g/dL    Total Bilirubin 0.3 0.1 - 1.0 mg/dL    Alkaline Phosphatase 64 55 - 135 U/L    AST 10 10 - 40 U/L    ALT 8 (L) 10 - 44 U/L    Anion Gap 16 8 - 16 mmol/L    eGFR if African American 6 (A) >60 mL/min/1.73 m^2    eGFR if non African American 5 (A) >60 mL/min/1.73 m^2   COVID-19 Rapid Screening   Result Value Ref Range    SARS-CoV-2 RNA, Amplification, Qual Negative Negative     *Note: Due to a large number of results and/or encounters for the requested time period, some results have not been displayed. A complete set of results can be found in Results  Review.         Significant Imaging:   Imaging Results          X-Ray Chest 1 View (Final result)  Result time 10/21/20 17:33:33    Final result by Yoel Ferrer MD (10/21/20 17:33:33)                 Impression:      No acute abnormality.      Electronically signed by: Yoel Ferrer  Date:    10/21/2020  Time:    17:33             Narrative:    EXAMINATION:  XR CHEST 1 VIEW    CLINICAL HISTORY:  encounter for dialysis;    TECHNIQUE:  Single frontal view of the chest was performed.    COMPARISON:  None    FINDINGS:  The lungs are clear, with normal appearance of pulmonary vasculature and no pleural effusion or pneumothorax.    The cardiac silhouette is normal in size. There is aortic atherosclerosis.    Bones are intact.                                I have independently reviewed all pertinent labs within the past 24 hours.    I have independently reviewed, visualized and interpreted all pertinent imaging results within the past 24 hours and discussed the findings with the ED physician, Dr. Gutierrez            Assessment/Plan:     * ESRD needing dialysis  To be initiated on hemodialysis 10/21/20.  Nephrology consulted.        Coronary artery disease involving native coronary artery of native heart  Denies chest pain at this time.  Continue home dose aspirin, Plavix.      Anemia in stage 4 chronic kidney disease  Hemoglobin 9.2.  Iron supplementation per Nephrology.      Hyperparathyroidism  Continue calcitriol.      Type 2 diabetes mellitus with hyperglycemia, with long-term current use of insulin  Blood sugar elevated at 404.  Anion gap 16, no evidence of DKA.  Patient takes 30 units NPH at home.  Will reduce dose to Levemir 20 units nightly.  Moderate dose insulin sliding scale.  Monitor blood sugars closely and make adjustments as needed.      Hypertension associated with diabetes  Continue home dose antihypertensives.  Will monitor and make adjustments as needed.        VTE Risk Mitigation (From admission,  onward)         Ordered     Place sequential compression device  Until discontinued      10/21/20 1939                   Irving Olivarez MD  Department of Hospital Medicine   [facility]

## 2020-10-23 LAB
POCT GLUCOSE: 152 MG/DL (ref 70–110)
POCT GLUCOSE: 211 MG/DL (ref 70–110)
POCT GLUCOSE: 255 MG/DL (ref 70–110)
POCT GLUCOSE: 324 MG/DL (ref 70–110)

## 2020-10-23 PROCEDURE — 21400001 HC TELEMETRY ROOM: Mod: HCNC

## 2020-10-23 PROCEDURE — 99233 PR SUBSEQUENT HOSPITAL CARE,LEVL III: ICD-10-PCS | Mod: HCNC,,, | Performed by: INTERNAL MEDICINE

## 2020-10-23 PROCEDURE — 25000003 PHARM REV CODE 250: Mod: HCNC | Performed by: INTERNAL MEDICINE

## 2020-10-23 PROCEDURE — 80100016 HC MAINTENANCE HEMODIALYSIS: Mod: HCNC

## 2020-10-23 PROCEDURE — 99233 SBSQ HOSP IP/OBS HIGH 50: CPT | Mod: HCNC,,, | Performed by: INTERNAL MEDICINE

## 2020-10-23 PROCEDURE — 96372 THER/PROPH/DIAG INJ SC/IM: CPT | Mod: HCNC

## 2020-10-23 RX ORDER — HEPARIN SODIUM 1000 [USP'U]/ML
2000 INJECTION, SOLUTION INTRAVENOUS; SUBCUTANEOUS ONCE
Status: DISCONTINUED | OUTPATIENT
Start: 2020-10-23 | End: 2020-10-24 | Stop reason: HOSPADM

## 2020-10-23 RX ADMIN — INSULIN ASPART 4 UNITS: 100 INJECTION, SOLUTION INTRAVENOUS; SUBCUTANEOUS at 11:10

## 2020-10-23 RX ADMIN — ATORVASTATIN CALCIUM 40 MG: 40 TABLET, FILM COATED ORAL at 11:10

## 2020-10-23 RX ADMIN — SEVELAMER CARBONATE 800 MG: 800 TABLET, FILM COATED ORAL at 05:10

## 2020-10-23 RX ADMIN — SEVELAMER CARBONATE 800 MG: 800 TABLET, FILM COATED ORAL at 11:10

## 2020-10-23 RX ADMIN — CLOPIDOGREL 75 MG: 75 TABLET, FILM COATED ORAL at 11:10

## 2020-10-23 RX ADMIN — NEPHROCAP 1 CAPSULE: 1 CAP ORAL at 11:10

## 2020-10-23 RX ADMIN — INSULIN ASPART 1 UNITS: 100 INJECTION, SOLUTION INTRAVENOUS; SUBCUTANEOUS at 10:10

## 2020-10-23 RX ADMIN — INSULIN ASPART 2 UNITS: 100 INJECTION, SOLUTION INTRAVENOUS; SUBCUTANEOUS at 05:10

## 2020-10-23 RX ADMIN — HYDRALAZINE HYDROCHLORIDE AND ISOSORBIDE DINITRATE 2 TABLET: 37.5; 2 TABLET, FILM COATED ORAL at 11:10

## 2020-10-23 RX ADMIN — CALCITRIOL CAPSULES 0.25 MCG 0.25 MCG: 0.25 CAPSULE ORAL at 11:10

## 2020-10-23 RX ADMIN — AMLODIPINE BESYLATE 5 MG: 5 TABLET ORAL at 11:10

## 2020-10-23 RX ADMIN — HYDRALAZINE HYDROCHLORIDE AND ISOSORBIDE DINITRATE 2 TABLET: 37.5; 2 TABLET, FILM COATED ORAL at 08:10

## 2020-10-23 RX ADMIN — ASPIRIN 81 MG: 81 TABLET, COATED ORAL at 11:10

## 2020-10-23 RX ADMIN — INSULIN DETEMIR 20 UNITS: 100 INJECTION, SOLUTION SUBCUTANEOUS at 08:10

## 2020-10-23 RX ADMIN — METOPROLOL TARTRATE 50 MG: 50 TABLET, FILM COATED ORAL at 08:10

## 2020-10-23 RX ADMIN — METOPROLOL TARTRATE 50 MG: 50 TABLET, FILM COATED ORAL at 11:10

## 2020-10-23 NOTE — PLAN OF CARE
Zachary contacted [facility] to f/u on referral. [facility] Rep stated they received documents that were faxed and is waiting on clinic review for chair-time. F/U number to [facility] is [phone].

## 2020-10-23 NOTE — HOSPITAL COURSE
10/23/20 The patient denies any issues or complaints overnight. The patient is receiving her 3rd dialysis treatment today. The case was discussed with Dr. Hernandez (Nephrology) who reports that the patients access infiltrated during dialysis. Will plan to rest access overnight. Will plan for HD tomorrow, if unable to use AV graft will need a vascath placed. 10/24/20 No acute issues overnight. The case was discussed with Nephrology who felt the patient can discharge if there were no issues with her AV graft. The patient was dialyzed today without issue. The patient was seen and examined today and deemed stable for discharge. The patient will continue outpatient dialysis at [facility]. Her chair time is MWF for 11:00am. Pt will begin Dialysis on Monday 10/26/2020. She will follow up with her PCP and with Nephrology.

## 2020-10-23 NOTE — ASSESSMENT & PLAN NOTE
To be initiated on hemodialysis 10/21/20.  Nephrology consulted.  AV fistula created earlier this year and being used successfully.

## 2020-10-23 NOTE — PROGRESS NOTES
[facility] Hospital Medicine  Progress Note    Patient Name: Avril Monzon  MRN: [id number]  Patient Class: IP- Inpatient   Admission Date: 10/21/2020  Length of Stay: 1 days  Attending Physician: Fletcher Herrera MD  Primary Care Provider: Rose Parks MD        Subjective:     Principal Problem:ESRD (end stage renal disease)        HPI:  Ms. Monzon is a pleasant 75-year-old  female with PMH significant for insulin-dependent type 2 diabetes mellitus, CKD stage 5, secondary to nephrotic syndrome from diabetic nephropathy, was sent to the ED to be admitted for initiation of hemodialysis.  Patient had left upper extremity fistula placed in April 2020.  Patient presented to Dr. Hanson as nephrology clinic earlier today complaining of worsening fatigue, decreasing appetite.  Denies fever, chills, nausea vomiting.  Laboratory workup reveals , creatinine 7.4, glucose 404.  Hemoglobin 9.2.  Patient is scheduled to be initiated on hemodialysis later tonight.    Admitting diagnosis:  New onset ESRD, to be initiated on hemodialysis this hospitalization.      Overview/Hospital Course:  No notes on file    Interval History:  Second of three cycles of hemodialysis today.  She denies any problem with symptoms during dialysis.    Review of Systems   Constitutional: Negative for chills and fever.   HENT: Negative for congestion and sore throat.    Eyes: Negative for visual disturbance.   Respiratory: Negative for cough, shortness of breath and wheezing.    Cardiovascular: Negative for chest pain, palpitations and leg swelling.   Gastrointestinal: Negative for abdominal pain, blood in stool, constipation, diarrhea, nausea and vomiting.   Genitourinary: Negative for dysuria and hematuria.   Musculoskeletal: Negative for arthralgias and back pain.   Skin: Negative for rash and wound.   Neurological: Negative for dizziness, weakness, light-headedness and numbness.   Hematological:  Negative for adenopathy.     Objective:     Vital Signs (Most Recent):  Temp: 98.8 °F (37.1 °C) (10/22/20 1912)  Pulse: 77 (10/22/20 2107)  Resp: 17 (10/22/20 1912)  BP: 137/67 (10/22/20 1912)  SpO2: 100 % (10/22/20 1912) Vital Signs (24h Range):  Temp:  [97.9 °F (36.6 °C)-98.8 °F (37.1 °C)] 98.8 °F (37.1 °C)  Pulse:  [59-77] 77  Resp:  [17-18] 17  SpO2:  [96 %-100 %] 100 %  BP: ()/(39-74) 137/67     Weight: 58.6 kg (129 lb 1.3 oz)  Body mass index is 21.48 kg/m².    Intake/Output Summary (Last 24 hours) at 10/22/2020 2248  Last data filed at 10/22/2020 1400  Gross per 24 hour   Intake 360 ml   Output --   Net 360 ml      Physical Exam  Vitals signs and nursing note reviewed.   Constitutional:       General: She is not in acute distress.     Appearance: She is well-developed.   HENT:      Head: Normocephalic and atraumatic.   Eyes:      Conjunctiva/sclera: Conjunctivae normal.      Pupils: Pupils are equal, round, and reactive to light.   Neck:      Musculoskeletal: Neck supple.      Thyroid: No thyromegaly.      Vascular: No JVD.   Cardiovascular:      Rate and Rhythm: Normal rate and regular rhythm.      Heart sounds: No murmur. No friction rub. No gallop.       Comments: Left upper arm AV fistula with good thrill.  Pulmonary:      Effort: Pulmonary effort is normal.      Breath sounds: Normal breath sounds. No wheezing or rales.   Abdominal:      General: Bowel sounds are normal. There is no distension.      Palpations: Abdomen is soft.      Tenderness: There is no abdominal tenderness. There is no guarding or rebound.   Musculoskeletal: Normal range of motion.         General: No deformity.   Lymphadenopathy:      Cervical: No cervical adenopathy.   Skin:     General: Skin is warm and dry.      Findings: No rash.   Neurological:      Mental Status: She is alert and oriented to person, place, and time.      Deep Tendon Reflexes: Reflexes are normal and symmetric.   Psychiatric:         Behavior: Behavior  normal.         Thought Content: Thought content normal.         Judgment: Judgment normal.         Significant Labs: All pertinent labs within the past 24 hours have been reviewed.    Significant Imaging: I have reviewed all pertinent imaging results/findings within the past 24 hours.      Assessment/Plan:      * ESRD (end stage renal disease)  To be initiated on hemodialysis 10/21/20.  Nephrology consulted.  AV fistula created earlier this year and being used successfully.    Coronary artery disease involving native coronary artery of native heart  Denies chest pain at this time.  Continue home dose aspirin, Plavix.      Anemia in stage 4 chronic kidney disease  Hemoglobin 9.2.  Iron supplementation per Nephrology.      Hyperparathyroidism  Continue calcitriol.      Type 2 diabetes mellitus with hyperglycemia, with long-term current use of insulin  Blood sugar elevated at 404.  Anion gap 16, no evidence of DKA.  Patient takes 30 units NPH at home.  Will reduce dose to Levemir 20 units nightly.  Moderate dose insulin sliding scale.  Monitor blood sugars closely and make adjustments as needed.      Hypertension associated with diabetes  Continue home dose antihypertensives.  Will monitor and make adjustments as needed.        VTE Risk Mitigation (From admission, onward)         Ordered     Place sequential compression device  Until discontinued      10/21/20 1939                Discharge Planning   MARIBELL:      Code Status: Prior   Is the patient medically ready for discharge?:     Reason for patient still in hospital (select all that apply): Treatment  Discharge Plan A: Home with family                  Fletcher Herrera MD  Department of Hospital Medicine   [facility]

## 2020-10-23 NOTE — SUBJECTIVE & OBJECTIVE
Interval History:  Second of three cycles of hemodialysis today.  She denies any problem with symptoms during dialysis.    Review of Systems   Constitutional: Negative for chills and fever.   HENT: Negative for congestion and sore throat.    Eyes: Negative for visual disturbance.   Respiratory: Negative for cough, shortness of breath and wheezing.    Cardiovascular: Negative for chest pain, palpitations and leg swelling.   Gastrointestinal: Negative for abdominal pain, blood in stool, constipation, diarrhea, nausea and vomiting.   Genitourinary: Negative for dysuria and hematuria.   Musculoskeletal: Negative for arthralgias and back pain.   Skin: Negative for rash and wound.   Neurological: Negative for dizziness, weakness, light-headedness and numbness.   Hematological: Negative for adenopathy.     Objective:     Vital Signs (Most Recent):  Temp: 98.8 °F (37.1 °C) (10/22/20 1912)  Pulse: 77 (10/22/20 2107)  Resp: 17 (10/22/20 1912)  BP: 137/67 (10/22/20 1912)  SpO2: 100 % (10/22/20 1912) Vital Signs (24h Range):  Temp:  [97.9 °F (36.6 °C)-98.8 °F (37.1 °C)] 98.8 °F (37.1 °C)  Pulse:  [59-77] 77  Resp:  [17-18] 17  SpO2:  [96 %-100 %] 100 %  BP: ()/(39-74) 137/67     Weight: 58.6 kg (129 lb 1.3 oz)  Body mass index is 21.48 kg/m².    Intake/Output Summary (Last 24 hours) at 10/22/2020 4346  Last data filed at 10/22/2020 1400  Gross per 24 hour   Intake 360 ml   Output --   Net 360 ml      Physical Exam  Vitals signs and nursing note reviewed.   Constitutional:       General: She is not in acute distress.     Appearance: She is well-developed.   HENT:      Head: Normocephalic and atraumatic.   Eyes:      Conjunctiva/sclera: Conjunctivae normal.      Pupils: Pupils are equal, round, and reactive to light.   Neck:      Musculoskeletal: Neck supple.      Thyroid: No thyromegaly.      Vascular: No JVD.   Cardiovascular:      Rate and Rhythm: Normal rate and regular rhythm.      Heart sounds: No murmur. No friction  rub. No gallop.       Comments: Left upper arm AV fistula with good thrill.  Pulmonary:      Effort: Pulmonary effort is normal.      Breath sounds: Normal breath sounds. No wheezing or rales.   Abdominal:      General: Bowel sounds are normal. There is no distension.      Palpations: Abdomen is soft.      Tenderness: There is no abdominal tenderness. There is no guarding or rebound.   Musculoskeletal: Normal range of motion.         General: No deformity.   Lymphadenopathy:      Cervical: No cervical adenopathy.   Skin:     General: Skin is warm and dry.      Findings: No rash.   Neurological:      Mental Status: She is alert and oriented to person, place, and time.      Deep Tendon Reflexes: Reflexes are normal and symmetric.   Psychiatric:         Behavior: Behavior normal.         Thought Content: Thought content normal.         Judgment: Judgment normal.         Significant Labs: All pertinent labs within the past 24 hours have been reviewed.    Significant Imaging: I have reviewed all pertinent imaging results/findings within the past 24 hours.

## 2020-10-23 NOTE — ASSESSMENT & PLAN NOTE
1. ESRD , due to hypertension type 2 diabetes, diabetic nephropathy, patient was admitted to initiate hemodialysis, today is her 3rd dialysis treatment, however, LUE AVF infiltrated, will rest AVF and re-evaluate patient tomorrow.     2.  Hypertension, acceptable BP control, monitor closely.     3.  Anemia of chronic kidney disease, continue Procrit on dialysis.    4.  Secondary hyperparathyroidism, continue calcitriol will continue Renvela with meals,    5.  Type 2 diabetes, management as per Medicine Team.    6.  Disposition, d/c home when clinically stable.

## 2020-10-23 NOTE — SUBJECTIVE & OBJECTIVE
Interval History: The patient denies any issues or complaints overnight. The patient is receiving her 3rd dialysis treatment today. The case was discussed with Dr. Hernandez (Nephrology) who reports that the patients access infiltrated during dialysis. Will plan to rest access overnight. Will plan for HD tomorrow, if unable to use AV graft will need a vascath placed.       Review of Systems   Constitutional: Negative for activity change, appetite change, chills, diaphoresis, fatigue, fever and unexpected weight change.   HENT: Negative for congestion, drooling, facial swelling, rhinorrhea, sinus pressure, sneezing and sore throat.    Eyes: Negative for discharge, redness, itching and visual disturbance.   Respiratory: Negative for apnea, cough, choking, chest tightness, shortness of breath, wheezing and stridor.    Cardiovascular: Negative for chest pain, palpitations and leg swelling.   Gastrointestinal: Negative for abdominal distention, abdominal pain, anal bleeding, blood in stool, constipation, diarrhea, nausea and vomiting.   Genitourinary: Negative for decreased urine volume, difficulty urinating, dysuria, frequency, hematuria, pelvic pain, urgency, vaginal bleeding and vaginal discharge.   Musculoskeletal: Negative for arthralgias, back pain, gait problem, joint swelling, myalgias, neck pain and neck stiffness.   Skin: Negative for color change, pallor, rash and wound.   Neurological: Negative for dizziness, seizures, facial asymmetry, speech difficulty, weakness, light-headedness, numbness and headaches.   Hematological: Negative for adenopathy.   Psychiatric/Behavioral: Negative for agitation, confusion, hallucinations and suicidal ideas.   All other systems reviewed and are negative.    Objective:     Vital Signs (Most Recent):  Temp: 97.5 °F (36.4 °C) (10/23/20 1248)  Pulse: 77 (10/23/20 1248)  Resp: 18 (10/23/20 1248)  BP: (!) 121/56 (10/23/20 1248)  SpO2: 99 % (10/23/20 1248) Vital Signs (24h  Range):  Temp:  [97.5 °F (36.4 °C)-98.8 °F (37.1 °C)] 97.5 °F (36.4 °C)  Pulse:  [64-77] 77  Resp:  [16-20] 18  SpO2:  [98 %-100 %] 99 %  BP: (116-156)/(48-73) 121/56     Weight: 58.6 kg (129 lb 1.3 oz)  Body mass index is 21.48 kg/m².    Intake/Output Summary (Last 24 hours) at 10/23/2020 1408  Last data filed at 10/23/2020 1035  Gross per 24 hour   Intake 940 ml   Output 405 ml   Net 535 ml      Physical Exam  Vitals signs and nursing note reviewed.   Constitutional:       General: She is not in acute distress.     Appearance: She is well-developed.   HENT:      Head: Normocephalic and atraumatic.   Eyes:      Conjunctiva/sclera: Conjunctivae normal.      Pupils: Pupils are equal, round, and reactive to light.   Neck:      Musculoskeletal: Neck supple.      Thyroid: No thyromegaly.      Vascular: No JVD.   Cardiovascular:      Rate and Rhythm: Normal rate and regular rhythm.      Heart sounds: No murmur. No friction rub. No gallop.       Comments: Left upper arm AV fistula with good thrill.  Pulmonary:      Effort: Pulmonary effort is normal.      Breath sounds: Normal breath sounds. No wheezing or rales.   Abdominal:      General: Bowel sounds are normal. There is no distension.      Palpations: Abdomen is soft.      Tenderness: There is no abdominal tenderness. There is no guarding or rebound.   Musculoskeletal: Normal range of motion.         General: No deformity.   Lymphadenopathy:      Cervical: No cervical adenopathy.   Skin:     General: Skin is warm and dry.      Findings: No rash.   Neurological:      Mental Status: She is alert and oriented to person, place, and time.      Deep Tendon Reflexes: Reflexes are normal and symmetric.   Psychiatric:         Behavior: Behavior normal.         Thought Content: Thought content normal.         Judgment: Judgment normal.         Significant Labs: All pertinent labs within the past 24 hours have been reviewed.    Significant Imaging:   Imaging Results           X-Ray Chest 1 View (Final result)  Result time 10/21/20 17:33:33    Final result by Yoel Ferrer MD (10/21/20 17:33:33)                 Impression:      No acute abnormality.      Electronically signed by: Yoel Ferrer  Date:    10/21/2020  Time:    17:33             Narrative:    EXAMINATION:  XR CHEST 1 VIEW    CLINICAL HISTORY:  encounter for dialysis;    TECHNIQUE:  Single frontal view of the chest was performed.    COMPARISON:  None    FINDINGS:  The lungs are clear, with normal appearance of pulmonary vasculature and no pleural effusion or pneumothorax.    The cardiac silhouette is normal in size. There is aortic atherosclerosis.    Bones are intact.

## 2020-10-23 NOTE — SUBJECTIVE & OBJECTIVE
Interval History:     10/23/20:  Patient is currently on dialysis. She is tolerating the procedure well. Vital signs are stable. Patient's left AVF infiltrated during dialysis. She denies any complaints.         Review of patient's allergies indicates:   Allergen Reactions    Codeine Swelling    Darvon [propoxyphene] Swelling     Current Facility-Administered Medications   Medication Frequency    0.9%  NaCl infusion PRN    acetaminophen tablet 650 mg Q6H PRN    albuterol-ipratropium 2.5 mg-0.5 mg/3 mL nebulizer solution 3 mL Q4H PRN    amLODIPine tablet 5 mg Daily    aspirin EC tablet 81 mg Daily    atorvastatin tablet 40 mg Daily    calcitRIOL capsule 0.25 mcg Daily    clopidogreL tablet 75 mg Daily    dextrose 50% injection 12.5 g PRN    dextrose 50% injection 25 g PRN    diphenhydrAMINE capsule 25 mg Q6H PRN    epoetin roman-epbx injection 10,000 Units Once    fluticasone propionate 50 mcg/actuation nasal spray 50 mcg Daily    glucagon (human recombinant) injection 1 mg PRN    glucose chewable tablet 16 g PRN    glucose chewable tablet 24 g PRN    guaifenesin 100 mg/5 ml syrup 200 mg Q4H PRN    heparin (porcine) injection 2,000 Units Once    insulin aspart U-100 pen 0-5 Units QID (AC + HS) PRN    insulin detemir U-100 pen 20 Units QHS    isosorbide-hydrALAZINE 20-37.5 mg per tablet 2 tablet TID    labetaloL injection 10 mg Q6H PRN    metoprolol tartrate (LOPRESSOR) tablet 50 mg BID    ondansetron injection 4 mg Q8H PRN    sevelamer carbonate tablet 800 mg TID WM    vitamin renal formula (B-complex-vitamin c-folic acid) 1 mg per capsule 1 capsule Daily       Objective:     Vital Signs (Most Recent):  Temp: 98.1 °F (36.7 °C) (10/23/20 0716)  Pulse: 64 (10/23/20 0716)  Resp: 16 (10/23/20 0716)  BP: (!) 156/73 (10/23/20 0716)  SpO2: 100 % (10/23/20 0716)  O2 Device (Oxygen Therapy): room air (10/23/20 0716) Vital Signs (24h Range):  Temp:  [97.9 °F (36.6 °C)-98.8 °F (37.1 °C)] 98.1 °F (36.7  °C)  Pulse:  [59-77] 64  Resp:  [16-20] 16  SpO2:  [98 %-100 %] 100 %  BP: ()/(39-73) 156/73     Weight: 58.6 kg (129 lb 1.3 oz) (10/21/20 1624)  Body mass index is 21.48 kg/m².  Body surface area is 1.64 meters squared.    I/O last 3 completed shifts:  In: 600 [P.O.:600]  Out: 500 [Other:500]    Physical Exam  Constitutional:       Appearance: She is well-developed.   HENT:      Head: Normocephalic.   Eyes:      Pupils: Pupils are equal, round, and reactive to light.   Neck:      Thyroid: No thyromegaly.   Cardiovascular:      Rate and Rhythm: Normal rate and regular rhythm.      Heart sounds: No friction rub.   Pulmonary:      Effort: Pulmonary effort is normal.      Breath sounds: Normal breath sounds. No wheezing.   Chest:      Chest wall: No tenderness.   Abdominal:      General: Bowel sounds are normal. There is no distension.      Palpations: Abdomen is soft.      Tenderness: There is no abdominal tenderness.   Lymphadenopathy:      Cervical: No cervical adenopathy.   Skin:     General: Skin is warm and dry.      Findings: No rash.   Neurological:      Mental Status: She is alert and oriented to person, place, and time.         Significant Labs:  Lab Results   Component Value Date    CREATININE 5.2 (H) 10/22/2020    BUN 91 (H) 10/22/2020     10/22/2020    K 4.3 10/22/2020     10/22/2020    CO2 22 (L) 10/22/2020     Lab Results   Component Value Date    .0 (H) 04/08/2020    CALCIUM 8.9 10/22/2020    PHOS 6.0 (H) 10/22/2020     Lab Results   Component Value Date    ALBUMIN 3.6 10/22/2020     Lab Results   Component Value Date    WBC 5.26 10/22/2020    HGB 9.3 (L) 10/22/2020    HCT 28.3 (L) 10/22/2020    MCV 90 10/22/2020     10/22/2020       Recent Labs   Lab 10/22/20  0709   MG 1.9         Significant Imaging:  Imaging Results          X-Ray Chest 1 View (Final result)  Result time 10/21/20 17:33:33    Final result by Yoel Ferrer MD (10/21/20 17:33:33)                  Impression:      No acute abnormality.      Electronically signed by: Yoel Ferrer  Date:    10/21/2020  Time:    17:33             Narrative:    EXAMINATION:  XR CHEST 1 VIEW    CLINICAL HISTORY:  encounter for dialysis;    TECHNIQUE:  Single frontal view of the chest was performed.    COMPARISON:  None    FINDINGS:  The lungs are clear, with normal appearance of pulmonary vasculature and no pleural effusion or pneumothorax.    The cardiac silhouette is normal in size. There is aortic atherosclerosis.    Bones are intact.

## 2020-10-23 NOTE — PLAN OF CARE
Patient remains free from falls and injuries. Blood glucose being monitored. Shunt positive to bruit and thrill. Patient ambulates and repositions self. Will continue to monitor.

## 2020-10-23 NOTE — PLAN OF CARE
Chart reviewed. Pt resting in bed with call light and personal items within reach.  Vitals stable.  Heart monitor 8678  Blood sugar checks  Pt came for initiation of dialysis third round on 10/23; still produces urine  Shunt in upper left arm since April; positive for bruit and thrill  Pressure dressing intact

## 2020-10-23 NOTE — ASSESSMENT & PLAN NOTE
To be initiated on hemodialysis 10/21/20.  Nephrology consulted.  AV fistula created earlier this year and being used successfully.  10/23/20 The patient denies any issues or complaints overnight. The patient is receiving her 3rd dialysis treatment today. The case was discussed with Dr. Hernandez (Nephrology) who reports that the patients access infiltrated during dialysis. Will plan to rest access overnight. Will plan for HD tomorrow, if unable to use AV graft will need a vascath placed.

## 2020-10-23 NOTE — PROGRESS NOTES
HD ordered for 3 hours; pt tolerated 1 hour, afterwards accessed infiltrated     Net + 95 mLs    L AVF accessed with 17 G needles and functioned well, pt re-positioned self onto L Arm and accessed infiltrated. Pt tolerated tx well    Dr. Hernandez visited with pt during tx and discussed POC

## 2020-10-23 NOTE — PLAN OF CARE
Zachary received a call from April at [facility]. Outpatient Dialysis is arranged with [facility]. Chair time is MWF for 11:00am. Pt will begin Dialysis on Monday 10/26/2020, need to arrive at 10:30am.

## 2020-10-23 NOTE — PROGRESS NOTES
[facility] Hospital Medicine  Progress Note    Patient Name: Avril Monzon  MRN: [id number]  Patient Class: IP- Inpatient   Admission Date: 10/21/2020  Length of Stay: 2 days  Attending Physician: Ester Magana MD  Primary Care Provider: Rose Parks MD        Subjective:     Principal Problem:ESRD (end stage renal disease)        HPI:  Ms. Monzon is a pleasant 75-year-old  female with PMH significant for insulin-dependent type 2 diabetes mellitus, CKD stage 5, secondary to nephrotic syndrome from diabetic nephropathy, was sent to the ED to be admitted for initiation of hemodialysis.  Patient had left upper extremity fistula placed in April 2020.  Patient presented to Dr. Hanson as nephrology clinic earlier today complaining of worsening fatigue, decreasing appetite.  Denies fever, chills, nausea vomiting.  Laboratory workup reveals , creatinine 7.4, glucose 404.  Hemoglobin 9.2.  Patient is scheduled to be initiated on hemodialysis later tonight.    Admitting diagnosis:  New onset ESRD, to be initiated on hemodialysis this hospitalization.      Overview/Hospital Course:  10/23/20 The patient denies any issues or complaints overnight. The patient is receiving her 3rd dialysis treatment today. The case was discussed with Dr. Hernandez (Nephrology) who reports that the patients access infiltrated during dialysis. Will plan to rest access overnight. Will plan for HD tomorrow, if unable to use AV graft will need a vascath placed.     Interval History: The patient denies any issues or complaints overnight. The patient is receiving her 3rd dialysis treatment today. The case was discussed with Dr. Hernandez (Nephrology) who reports that the patients access infiltrated during dialysis. Will plan to rest access overnight. Will plan for HD tomorrow, if unable to use AV graft will need a vascath placed.       Review of Systems   Constitutional: Negative for activity change, appetite  change, chills, diaphoresis, fatigue, fever and unexpected weight change.   HENT: Negative for congestion, drooling, facial swelling, rhinorrhea, sinus pressure, sneezing and sore throat.    Eyes: Negative for discharge, redness, itching and visual disturbance.   Respiratory: Negative for apnea, cough, choking, chest tightness, shortness of breath, wheezing and stridor.    Cardiovascular: Negative for chest pain, palpitations and leg swelling.   Gastrointestinal: Negative for abdominal distention, abdominal pain, anal bleeding, blood in stool, constipation, diarrhea, nausea and vomiting.   Genitourinary: Negative for decreased urine volume, difficulty urinating, dysuria, frequency, hematuria, pelvic pain, urgency, vaginal bleeding and vaginal discharge.   Musculoskeletal: Negative for arthralgias, back pain, gait problem, joint swelling, myalgias, neck pain and neck stiffness.   Skin: Negative for color change, pallor, rash and wound.   Neurological: Negative for dizziness, seizures, facial asymmetry, speech difficulty, weakness, light-headedness, numbness and headaches.   Hematological: Negative for adenopathy.   Psychiatric/Behavioral: Negative for agitation, confusion, hallucinations and suicidal ideas.   All other systems reviewed and are negative.    Objective:     Vital Signs (Most Recent):  Temp: 97.5 °F (36.4 °C) (10/23/20 1248)  Pulse: 77 (10/23/20 1248)  Resp: 18 (10/23/20 1248)  BP: (!) 121/56 (10/23/20 1248)  SpO2: 99 % (10/23/20 1248) Vital Signs (24h Range):  Temp:  [97.5 °F (36.4 °C)-98.8 °F (37.1 °C)] 97.5 °F (36.4 °C)  Pulse:  [64-77] 77  Resp:  [16-20] 18  SpO2:  [98 %-100 %] 99 %  BP: (116-156)/(48-73) 121/56     Weight: 58.6 kg (129 lb 1.3 oz)  Body mass index is 21.48 kg/m².    Intake/Output Summary (Last 24 hours) at 10/23/2020 1408  Last data filed at 10/23/2020 1035  Gross per 24 hour   Intake 940 ml   Output 405 ml   Net 535 ml      Physical Exam  Vitals signs and nursing note reviewed.    Constitutional:       General: She is not in acute distress.     Appearance: She is well-developed.   HENT:      Head: Normocephalic and atraumatic.   Eyes:      Conjunctiva/sclera: Conjunctivae normal.      Pupils: Pupils are equal, round, and reactive to light.   Neck:      Musculoskeletal: Neck supple.      Thyroid: No thyromegaly.      Vascular: No JVD.   Cardiovascular:      Rate and Rhythm: Normal rate and regular rhythm.      Heart sounds: No murmur. No friction rub. No gallop.       Comments: Left upper arm AV fistula with good thrill.  Pulmonary:      Effort: Pulmonary effort is normal.      Breath sounds: Normal breath sounds. No wheezing or rales.   Abdominal:      General: Bowel sounds are normal. There is no distension.      Palpations: Abdomen is soft.      Tenderness: There is no abdominal tenderness. There is no guarding or rebound.   Musculoskeletal: Normal range of motion.         General: No deformity.   Lymphadenopathy:      Cervical: No cervical adenopathy.   Skin:     General: Skin is warm and dry.      Findings: No rash.   Neurological:      Mental Status: She is alert and oriented to person, place, and time.      Deep Tendon Reflexes: Reflexes are normal and symmetric.   Psychiatric:         Behavior: Behavior normal.         Thought Content: Thought content normal.         Judgment: Judgment normal.         Significant Labs: All pertinent labs within the past 24 hours have been reviewed.    Significant Imaging:   Imaging Results          X-Ray Chest 1 View (Final result)  Result time 10/21/20 17:33:33    Final result by Yoel Ferrer MD (10/21/20 17:33:33)                 Impression:      No acute abnormality.      Electronically signed by: Yoel Ferrer  Date:    10/21/2020  Time:    17:33             Narrative:    EXAMINATION:  XR CHEST 1 VIEW    CLINICAL HISTORY:  encounter for dialysis;    TECHNIQUE:  Single frontal view of the chest was  performed.    COMPARISON:  None    FINDINGS:  The lungs are clear, with normal appearance of pulmonary vasculature and no pleural effusion or pneumothorax.    The cardiac silhouette is normal in size. There is aortic atherosclerosis.    Bones are intact.                                    Assessment/Plan:      * ESRD (end stage renal disease)  To be initiated on hemodialysis 10/21/20.  Nephrology consulted.  AV fistula created earlier this year and being used successfully.  10/23/20 The patient denies any issues or complaints overnight. The patient is receiving her 3rd dialysis treatment today. The case was discussed with Dr. Hernandez (Nephrology) who reports that the patients access infiltrated during dialysis. Will plan to rest access overnight. Will plan for HD tomorrow, if unable to use AV graft will need a vascath placed.       Coronary artery disease involving native coronary artery of native heart  Denies chest pain at this time.  Continue home dose aspirin, Plavix.      Anemia in stage 4 chronic kidney disease  Hemoglobin 9.2.  Iron supplementation per Nephrology.      Hyperparathyroidism  Continue calcitriol.      Type 2 diabetes mellitus with hyperglycemia, with long-term current use of insulin  Blood sugar elevated at 404.  Anion gap 16, no evidence of DKA.  Patient takes 30 units NPH at home.  Will reduce dose to Levemir 20 units nightly.  Moderate dose insulin sliding scale.  Monitor blood sugars closely and make adjustments as needed.      Hypertension associated with diabetes  Continue home dose antihypertensives.  Will monitor and make adjustments as needed.        VTE Risk Mitigation (From admission, onward)         Ordered     heparin (porcine) injection 2,000 Units  Once      10/23/20 0959     Place sequential compression device  Until discontinued      10/21/20 1939                Discharge Planning   MARIBELL:      Code Status: Prior   Is the patient medically ready for discharge?:     Reason for  patient still in hospital (select all that apply): Patient new problem, Patient trending condition, Laboratory test, Treatment and Consult recommendations  Discharge Plan A: Home with family                  Noman Drake NP  Department of Hospital Medicine   [facility]

## 2020-10-23 NOTE — PROGRESS NOTES
[facility]   Nephrology  Progress Note    Patient Name: Avril Monzon  MRN: [id number]  Admission Date: 10/21/2020  Hospital Length of Stay: 2 days  Attending Provider: Ester Magana MD   Primary Care Physician: Rose Parks MD  Principal Problem:ESRD (end stage renal disease)    Subjective:     HPI: Avril Monzon is a pleasant 75-year-old  woman with history of hypertension, type 2 diabetes, diabetic nephropathy, chronic kidney disease stage 5 due to diabetes and hypertension, diabetic retinopathy, was admitted to the hospital electively last night will initiate hemodialysis, she is status post LUE AVF , Dr White , 4/2020, dialysis was initiated due to progressive weakness, she had 1st dialysis treatment last evening, tolerated well, no issues with the access, patient denies any chest pain or shortness of breath,    Interval History:     10/23/20:  Patient is currently on dialysis. She is tolerating the procedure well. Vital signs are stable. Patient's left AVF infiltrated during dialysis. She denies any complaints.         Review of patient's allergies indicates:   Allergen Reactions    Codeine Swelling    Darvon [propoxyphene] Swelling     Current Facility-Administered Medications   Medication Frequency    0.9%  NaCl infusion PRN    acetaminophen tablet 650 mg Q6H PRN    albuterol-ipratropium 2.5 mg-0.5 mg/3 mL nebulizer solution 3 mL Q4H PRN    amLODIPine tablet 5 mg Daily    aspirin EC tablet 81 mg Daily    atorvastatin tablet 40 mg Daily    calcitRIOL capsule 0.25 mcg Daily    clopidogreL tablet 75 mg Daily    dextrose 50% injection 12.5 g PRN    dextrose 50% injection 25 g PRN    diphenhydrAMINE capsule 25 mg Q6H PRN    epoetin roman-epbx injection 10,000 Units Once    fluticasone propionate 50 mcg/actuation nasal spray 50 mcg Daily    glucagon (human recombinant) injection 1 mg PRN    glucose chewable tablet 16 g PRN    glucose chewable tablet 24 g PRN     guaifenesin 100 mg/5 ml syrup 200 mg Q4H PRN    heparin (porcine) injection 2,000 Units Once    insulin aspart U-100 pen 0-5 Units QID (AC + HS) PRN    insulin detemir U-100 pen 20 Units QHS    isosorbide-hydrALAZINE 20-37.5 mg per tablet 2 tablet TID    labetaloL injection 10 mg Q6H PRN    metoprolol tartrate (LOPRESSOR) tablet 50 mg BID    ondansetron injection 4 mg Q8H PRN    sevelamer carbonate tablet 800 mg TID WM    vitamin renal formula (B-complex-vitamin c-folic acid) 1 mg per capsule 1 capsule Daily       Objective:     Vital Signs (Most Recent):  Temp: 98.1 °F (36.7 °C) (10/23/20 0716)  Pulse: 64 (10/23/20 0716)  Resp: 16 (10/23/20 0716)  BP: (!) 156/73 (10/23/20 0716)  SpO2: 100 % (10/23/20 0716)  O2 Device (Oxygen Therapy): room air (10/23/20 0716) Vital Signs (24h Range):  Temp:  [97.9 °F (36.6 °C)-98.8 °F (37.1 °C)] 98.1 °F (36.7 °C)  Pulse:  [59-77] 64  Resp:  [16-20] 16  SpO2:  [98 %-100 %] 100 %  BP: ()/(39-73) 156/73     Weight: 58.6 kg (129 lb 1.3 oz) (10/21/20 1624)  Body mass index is 21.48 kg/m².  Body surface area is 1.64 meters squared.    I/O last 3 completed shifts:  In: 600 [P.O.:600]  Out: 500 [Other:500]    Physical Exam  Constitutional:       Appearance: She is well-developed.   HENT:      Head: Normocephalic.   Eyes:      Pupils: Pupils are equal, round, and reactive to light.   Neck:      Thyroid: No thyromegaly.   Cardiovascular:      Rate and Rhythm: Normal rate and regular rhythm.      Heart sounds: No friction rub.   Pulmonary:      Effort: Pulmonary effort is normal.      Breath sounds: Normal breath sounds. No wheezing.   Chest:      Chest wall: No tenderness.   Abdominal:      General: Bowel sounds are normal. There is no distension.      Palpations: Abdomen is soft.      Tenderness: There is no abdominal tenderness.   Lymphadenopathy:      Cervical: No cervical adenopathy.   Skin:     General: Skin is warm and dry.      Findings: No rash.   Neurological:       Mental Status: She is alert and oriented to person, place, and time.         Significant Labs:  Lab Results   Component Value Date    CREATININE 5.2 (H) 10/22/2020    BUN 91 (H) 10/22/2020     10/22/2020    K 4.3 10/22/2020     10/22/2020    CO2 22 (L) 10/22/2020     Lab Results   Component Value Date    .0 (H) 04/08/2020    CALCIUM 8.9 10/22/2020    PHOS 6.0 (H) 10/22/2020     Lab Results   Component Value Date    ALBUMIN 3.6 10/22/2020     Lab Results   Component Value Date    WBC 5.26 10/22/2020    HGB 9.3 (L) 10/22/2020    HCT 28.3 (L) 10/22/2020    MCV 90 10/22/2020     10/22/2020       Recent Labs   Lab 10/22/20  0709   MG 1.9         Significant Imaging:  Imaging Results          X-Ray Chest 1 View (Final result)  Result time 10/21/20 17:33:33    Final result by Yoel Ferrer MD (10/21/20 17:33:33)                 Impression:      No acute abnormality.      Electronically signed by: Yoel Ferrer  Date:    10/21/2020  Time:    17:33             Narrative:    EXAMINATION:  XR CHEST 1 VIEW    CLINICAL HISTORY:  encounter for dialysis;    TECHNIQUE:  Single frontal view of the chest was performed.    COMPARISON:  None    FINDINGS:  The lungs are clear, with normal appearance of pulmonary vasculature and no pleural effusion or pneumothorax.    The cardiac silhouette is normal in size. There is aortic atherosclerosis.    Bones are intact.                                  Assessment/Plan:     * ESRD (end stage renal disease)  1. ESRD , due to hypertension type 2 diabetes, diabetic nephropathy, patient was admitted to initiate hemodialysis, today is her 3rd dialysis treatment, however, LUE AVF infiltrated, will rest AVF and re-evaluate patient tomorrow.     2.  Hypertension, acceptable BP control, monitor closely.     3.  Anemia of chronic kidney disease, continue Procrit on dialysis.    4.  Secondary hyperparathyroidism, continue calcitriol will continue Renvela with meals,    5.   Type 2 diabetes, management as per Medicine Team.    6.  Disposition, d/c home when clinically stable.             Thank you for your consult. I will follow-up with patient. Please contact us if you have any additional questions.    Dudley Hernandez MD  Nephrology  [facility]

## 2020-10-24 VITALS
DIASTOLIC BLOOD PRESSURE: 68 MMHG | SYSTOLIC BLOOD PRESSURE: 136 MMHG | OXYGEN SATURATION: 97 % | HEIGHT: 65 IN | BODY MASS INDEX: 21.5 KG/M2 | HEART RATE: 68 BPM | WEIGHT: 129.06 LBS | RESPIRATION RATE: 17 BRPM | TEMPERATURE: 98 F

## 2020-10-24 LAB
ALBUMIN SERPL BCP-MCNC: 3.3 G/DL (ref 3.5–5.2)
ALP SERPL-CCNC: 45 U/L (ref 55–135)
ALT SERPL W/O P-5'-P-CCNC: 8 U/L (ref 10–44)
ANION GAP SERPL CALC-SCNC: 10 MMOL/L (ref 8–16)
AST SERPL-CCNC: 13 U/L (ref 10–40)
BASOPHILS # BLD AUTO: 0.02 K/UL (ref 0–0.2)
BASOPHILS NFR BLD: 0.4 % (ref 0–1.9)
BILIRUB SERPL-MCNC: 0.3 MG/DL (ref 0.1–1)
BUN SERPL-MCNC: 67 MG/DL (ref 8–23)
CALCIUM SERPL-MCNC: 8.7 MG/DL (ref 8.7–10.5)
CHLORIDE SERPL-SCNC: 106 MMOL/L (ref 95–110)
CO2 SERPL-SCNC: 24 MMOL/L (ref 23–29)
CREAT SERPL-MCNC: 4.8 MG/DL (ref 0.5–1.4)
DIFFERENTIAL METHOD: ABNORMAL
EOSINOPHIL # BLD AUTO: 0.1 K/UL (ref 0–0.5)
EOSINOPHIL NFR BLD: 1.4 % (ref 0–8)
ERYTHROCYTE [DISTWIDTH] IN BLOOD BY AUTOMATED COUNT: 13.4 % (ref 11.5–14.5)
EST. GFR  (AFRICAN AMERICAN): 10 ML/MIN/1.73 M^2
EST. GFR  (NON AFRICAN AMERICAN): 8 ML/MIN/1.73 M^2
GLUCOSE SERPL-MCNC: 69 MG/DL (ref 70–110)
HBV SURFACE AB SER QL IA: NEGATIVE
HBV SURFACE AB SERPL IA-ACNC: <3 MIU/ML
HCT VFR BLD AUTO: 25.7 % (ref 37–48.5)
HGB BLD-MCNC: 8.4 G/DL (ref 12–16)
IMM GRANULOCYTES # BLD AUTO: 0.02 K/UL (ref 0–0.04)
IMM GRANULOCYTES NFR BLD AUTO: 0.4 % (ref 0–0.5)
LYMPHOCYTES # BLD AUTO: 1.3 K/UL (ref 1–4.8)
LYMPHOCYTES NFR BLD: 24.6 % (ref 18–48)
MCH RBC QN AUTO: 29.7 PG (ref 27–31)
MCHC RBC AUTO-ENTMCNC: 32.7 G/DL (ref 32–36)
MCV RBC AUTO: 91 FL (ref 82–98)
MONOCYTES # BLD AUTO: 0.5 K/UL (ref 0.3–1)
MONOCYTES NFR BLD: 9.1 % (ref 4–15)
NEUTROPHILS # BLD AUTO: 3.3 K/UL (ref 1.8–7.7)
NEUTROPHILS NFR BLD: 64.1 % (ref 38–73)
NRBC BLD-RTO: 0 /100 WBC
PLATELET # BLD AUTO: 164 K/UL (ref 150–350)
PMV BLD AUTO: 9.8 FL (ref 9.2–12.9)
POCT GLUCOSE: 116 MG/DL (ref 70–110)
POCT GLUCOSE: 193 MG/DL (ref 70–110)
POTASSIUM SERPL-SCNC: 4.2 MMOL/L (ref 3.5–5.1)
PROT SERPL-MCNC: 6 G/DL (ref 6–8.4)
RBC # BLD AUTO: 2.83 M/UL (ref 4–5.4)
SODIUM SERPL-SCNC: 140 MMOL/L (ref 136–145)
WBC # BLD AUTO: 5.08 K/UL (ref 3.9–12.7)

## 2020-10-24 PROCEDURE — 99233 PR SUBSEQUENT HOSPITAL CARE,LEVL III: ICD-10-PCS | Mod: HCNC,,, | Performed by: INTERNAL MEDICINE

## 2020-10-24 PROCEDURE — 99233 SBSQ HOSP IP/OBS HIGH 50: CPT | Mod: HCNC,,, | Performed by: INTERNAL MEDICINE

## 2020-10-24 PROCEDURE — 25000003 PHARM REV CODE 250: Mod: HCNC | Performed by: INTERNAL MEDICINE

## 2020-10-24 PROCEDURE — 25000242 PHARM REV CODE 250 ALT 637 W/ HCPCS: Mod: HCNC | Performed by: INTERNAL MEDICINE

## 2020-10-24 PROCEDURE — 80053 COMPREHEN METABOLIC PANEL: CPT | Mod: HCNC

## 2020-10-24 PROCEDURE — 36415 COLL VENOUS BLD VENIPUNCTURE: CPT | Mod: HCNC

## 2020-10-24 PROCEDURE — 63600175 PHARM REV CODE 636 W HCPCS: Mod: TB,HCNC | Performed by: INTERNAL MEDICINE

## 2020-10-24 PROCEDURE — 90935 HEMODIALYSIS ONE EVALUATION: CPT | Mod: HCNC

## 2020-10-24 PROCEDURE — 85025 COMPLETE CBC W/AUTO DIFF WBC: CPT | Mod: HCNC

## 2020-10-24 RX ORDER — SODIUM CHLORIDE 9 MG/ML
INJECTION, SOLUTION INTRAVENOUS ONCE
Status: CANCELLED | OUTPATIENT
Start: 2020-10-24 | End: 2020-10-24

## 2020-10-24 RX ORDER — SEVELAMER CARBONATE 800 MG/1
800 TABLET, FILM COATED ORAL
Qty: 90 TABLET | Refills: 2 | Status: SHIPPED | OUTPATIENT
Start: 2020-10-25 | End: 2022-03-25

## 2020-10-24 RX ADMIN — HYDRALAZINE HYDROCHLORIDE AND ISOSORBIDE DINITRATE 2 TABLET: 37.5; 2 TABLET, FILM COATED ORAL at 10:10

## 2020-10-24 RX ADMIN — METOPROLOL TARTRATE 50 MG: 50 TABLET, FILM COATED ORAL at 10:10

## 2020-10-24 RX ADMIN — ATORVASTATIN CALCIUM 40 MG: 40 TABLET, FILM COATED ORAL at 10:10

## 2020-10-24 RX ADMIN — CALCITRIOL CAPSULES 0.25 MCG 0.25 MCG: 0.25 CAPSULE ORAL at 10:10

## 2020-10-24 RX ADMIN — NEPHROCAP 1 CAPSULE: 1 CAP ORAL at 10:10

## 2020-10-24 RX ADMIN — SEVELAMER CARBONATE 800 MG: 800 TABLET, FILM COATED ORAL at 12:10

## 2020-10-24 RX ADMIN — AMLODIPINE BESYLATE 5 MG: 5 TABLET ORAL at 10:10

## 2020-10-24 RX ADMIN — ASPIRIN 81 MG: 81 TABLET, COATED ORAL at 10:10

## 2020-10-24 RX ADMIN — HYDRALAZINE HYDROCHLORIDE AND ISOSORBIDE DINITRATE 2 TABLET: 37.5; 2 TABLET, FILM COATED ORAL at 03:10

## 2020-10-24 RX ADMIN — FLUTICASONE PROPIONATE 50 MCG: 50 SPRAY, METERED NASAL at 10:10

## 2020-10-24 RX ADMIN — CLOPIDOGREL 75 MG: 75 TABLET, FILM COATED ORAL at 10:10

## 2020-10-24 RX ADMIN — EPOETIN ALFA-EPBX 10000 UNITS: 10000 INJECTION, SOLUTION INTRAVENOUS; SUBCUTANEOUS at 05:10

## 2020-10-24 RX ADMIN — SEVELAMER CARBONATE 800 MG: 800 TABLET, FILM COATED ORAL at 10:10

## 2020-10-24 NOTE — PROGRESS NOTES
[facility]   Nephrology  Progress Note    Patient Name: Avril Monzon  MRN: [id number]  Admission Date: 10/21/2020  Hospital Length of Stay: 3 days  Attending Provider: Ester Magana MD   Primary Care Physician: Rose Parks MD  Principal Problem:ESRD (end stage renal disease)    Subjective:     HPI: Avril Monzon is a pleasant 75-year-old  woman with history of hypertension, type 2 diabetes, diabetic nephropathy, chronic kidney disease stage 5 due to diabetes and hypertension, diabetic retinopathy, was admitted to the hospital electively last night will initiate hemodialysis, she is status post LUE AVF , Dr White , 4/2020, dialysis was initiated due to progressive weakness, she had 1st dialysis treatment last evening, tolerated well, no issues with the access, patient denies any chest pain or shortness of breath,    Interval History:     10/23/20:  Patient is currently on dialysis. She is tolerating the procedure well. Vital signs are stable. Patient's left AVF infiltrated during dialysis. She denies any complaints.     10/24/20:  Patient has no complaints at present. No access tenderness, no visible hematoma.           Review of patient's allergies indicates:   Allergen Reactions    Codeine Swelling    Darvon [propoxyphene] Swelling     Current Facility-Administered Medications   Medication Frequency    0.9%  NaCl infusion PRN    acetaminophen tablet 650 mg Q6H PRN    albuterol-ipratropium 2.5 mg-0.5 mg/3 mL nebulizer solution 3 mL Q4H PRN    amLODIPine tablet 5 mg Daily    aspirin EC tablet 81 mg Daily    atorvastatin tablet 40 mg Daily    calcitRIOL capsule 0.25 mcg Daily    clopidogreL tablet 75 mg Daily    dextrose 50% injection 12.5 g PRN    dextrose 50% injection 25 g PRN    diphenhydrAMINE capsule 25 mg Q6H PRN    epoetin roman-epbx injection 10,000 Units Once    fluticasone propionate 50 mcg/actuation nasal spray 50 mcg Daily    glucagon (human  recombinant) injection 1 mg PRN    glucose chewable tablet 16 g PRN    glucose chewable tablet 24 g PRN    guaifenesin 100 mg/5 ml syrup 200 mg Q4H PRN    heparin (porcine) injection 2,000 Units Once    insulin aspart U-100 pen 0-5 Units QID (AC + HS) PRN    insulin detemir U-100 pen 20 Units QHS    isosorbide-hydrALAZINE 20-37.5 mg per tablet 2 tablet TID    labetaloL injection 10 mg Q6H PRN    metoprolol tartrate (LOPRESSOR) tablet 50 mg BID    ondansetron injection 4 mg Q8H PRN    sevelamer carbonate tablet 800 mg TID WM    vitamin renal formula (B-complex-vitamin c-folic acid) 1 mg per capsule 1 capsule Daily       Objective:     Vital Signs (Most Recent):  Temp: 98.2 °F (36.8 °C) (10/24/20 0733)  Pulse: 64 (10/24/20 0733)  Resp: 18 (10/24/20 0733)  BP: (!) 144/65 (10/24/20 0733)  SpO2: 98 % (10/24/20 0733)  O2 Device (Oxygen Therapy): room air (10/23/20 0716) Vital Signs (24h Range):  Temp:  [97.5 °F (36.4 °C)-98.8 °F (37.1 °C)] 98.2 °F (36.8 °C)  Pulse:  [63-77] 64  Resp:  [16-18] 18  SpO2:  [98 %-100 %] 98 %  BP: (121-163)/(53-70) 144/65     Weight: 58.6 kg (129 lb 1.3 oz) (10/21/20 1624)  Body mass index is 21.48 kg/m².  Body surface area is 1.64 meters squared.    I/O last 3 completed shifts:  In: 1420 [P.O.:920; Other:500]  Out: 405 [Other:405]    Physical Exam  Constitutional:       Appearance: She is well-developed.   HENT:      Head: Normocephalic.   Eyes:      Pupils: Pupils are equal, round, and reactive to light.   Neck:      Thyroid: No thyromegaly.   Cardiovascular:      Rate and Rhythm: Normal rate and regular rhythm.      Heart sounds: No friction rub.   Pulmonary:      Effort: Pulmonary effort is normal.      Breath sounds: Normal breath sounds. No wheezing.   Chest:      Chest wall: No tenderness.   Abdominal:      General: Bowel sounds are normal. There is no distension.      Palpations: Abdomen is soft.      Tenderness: There is no abdominal tenderness.   Lymphadenopathy:       Cervical: No cervical adenopathy.   Skin:     General: Skin is warm and dry.      Findings: No rash.   Neurological:      Mental Status: She is alert and oriented to person, place, and time.         Significant Labs:  Lab Results   Component Value Date    CREATININE 4.8 (H) 10/24/2020    BUN 67 (H) 10/24/2020     10/24/2020    K 4.2 10/24/2020     10/24/2020    CO2 24 10/24/2020     Lab Results   Component Value Date    .0 (H) 04/08/2020    CALCIUM 8.7 10/24/2020    PHOS 6.0 (H) 10/22/2020     Lab Results   Component Value Date    ALBUMIN 3.3 (L) 10/24/2020     Lab Results   Component Value Date    WBC 5.08 10/24/2020    HGB 8.4 (L) 10/24/2020    HCT 25.7 (L) 10/24/2020    MCV 91 10/24/2020     10/24/2020       Recent Labs   Lab 10/22/20  0709   MG 1.9         Significant Imaging:  Imaging Results          X-Ray Chest 1 View (Final result)  Result time 10/21/20 17:33:33    Final result by Yoel Ferrer MD (10/21/20 17:33:33)                 Impression:      No acute abnormality.      Electronically signed by: Yoel Ferrer  Date:    10/21/2020  Time:    17:33             Narrative:    EXAMINATION:  XR CHEST 1 VIEW    CLINICAL HISTORY:  encounter for dialysis;    TECHNIQUE:  Single frontal view of the chest was performed.    COMPARISON:  None    FINDINGS:  The lungs are clear, with normal appearance of pulmonary vasculature and no pleural effusion or pneumothorax.    The cardiac silhouette is normal in size. There is aortic atherosclerosis.    Bones are intact.                                  Assessment/Plan:     * ESRD (end stage renal disease)  1. ESRD , due to hypertension type 2 diabetes, diabetic nephropathy, patient was admitted to initiate hemodialysis, today is her 4th dialysis treatment, however, LUE AVF infiltrated on 10/23, access was re-evalauaed today and was non-tender, no visible hematoma. Plan is to resume dialysis today via AVF.     2.  Hypertension, acceptable BP  control, monitor closely.     3.  Anemia of chronic kidney disease, continue Procrit on dialysis.    4.  Secondary hyperparathyroidism, continue calcitriol will continue Renvela with meals,    5.  Type 2 diabetes, management as per Medicine Team.    6.  Disposition, d/c home when clinically stable.             Thank you for your consult. I will follow-up with patient. Please contact us if you have any additional questions.    Dudley Hernandez MD  Nephrology  [facility]

## 2020-10-24 NOTE — DISCHARGE SUMMARY
[facility] Hospital Medicine  Discharge Summary      Patient Name: Avril Monzon  MRN: [id number]  Admission Date: 10/21/2020  Hospital Length of Stay: 3 days  Discharge Date and Time:  10/24/2020 6:10 PM  Attending Physician: Ester Magana MD   Discharging Provider: Noman Drake NP  Primary Care Provider: Rose Parks MD      HPI:   Ms. Monzon is a pleasant 75-year-old  female with PMH significant for insulin-dependent type 2 diabetes mellitus, CKD stage 5, secondary to nephrotic syndrome from diabetic nephropathy, was sent to the ED to be admitted for initiation of hemodialysis.  Patient had left upper extremity fistula placed in April 2020.  Patient presented to Dr. Hanson as nephrology clinic earlier today complaining of worsening fatigue, decreasing appetite.  Denies fever, chills, nausea vomiting.  Laboratory workup reveals , creatinine 7.4, glucose 404.  Hemoglobin 9.2.  Patient is scheduled to be initiated on hemodialysis later tonight.    Admitting diagnosis:  New onset ESRD, to be initiated on hemodialysis this hospitalization.      * No surgery found *      Hospital Course:   10/23/20 The patient denies any issues or complaints overnight. The patient is receiving her 3rd dialysis treatment today. The case was discussed with Dr. Hernandez (Nephrology) who reports that the patients access infiltrated during dialysis. Will plan to rest access overnight. Will plan for HD tomorrow, if unable to use AV graft will need a vascath placed. 10/24/20 No acute issues overnight. The case was discussed with Nephrology who felt the patient can discharge if there were no issues with her AV graft. The patient was dialyzed today without issue. The patient was seen and examined today and deemed stable for discharge. The patient will continue outpatient dialysis at [facility]. Her chair time is MWF for 11:00am. Pt will begin Dialysis on Monday 10/26/2020. She will follow  up with her PCP and with Nephrology.      Consults:   Consults (From admission, onward)        Status Ordering Provider     Inpatient consult to Nephrology  Once     Provider:  Angela Hanson MD    Completed GEE BAI     Inpatient consult to Social Work  Once     Provider:  (Not yet assigned)    Completed PEÑA SAVAGE          No new Assessment & Plan notes have been filed under this hospital service since the last note was generated.  Service: Hospital Medicine    Final Active Diagnoses:    Diagnosis Date Noted POA    PRINCIPAL PROBLEM:  ESRD (end stage renal disease) [N18.6] 10/21/2020 Yes    Encounter for dialysis [Z99.2]  Not Applicable    Hypertension [I10] 04/05/2019 Yes    Coronary artery disease involving native coronary artery of native heart [I25.10] 11/30/2018 Yes    Anemia in stage 4 chronic kidney disease [N18.4, D63.1] 05/18/2017 Yes     Chronic    Hyperparathyroidism [E21.3] 05/16/2017 Yes    Hypertension associated with diabetes [E11.59, I10]  Yes     Chronic    Type 2 diabetes mellitus with hyperglycemia, with long-term current use of insulin [E11.65, Z79.4]  Not Applicable      Problems Resolved During this Admission:       Discharged Condition: stable    Disposition: Home or Self Care    Follow Up:  Follow-up Information     Rose Parks MD. Schedule an appointment as soon as possible for a visit in 3 days.    Specialty: Internal Medicine  Contact information:  [address] [place] [address]  [phone]             Angela Hanson MD. Schedule an appointment as soon as possible for a visit in 1 week.    Specialty: Nephrology  Contact information:  [address] [place] [id number]  [phone]                 Patient Instructions:   No discharge procedures on file.    Significant Diagnostic Studies:   Imaging Results          X-Ray Chest 1 View (Final result)  Result time 10/21/20 17:33:33    Final result by Yoel Ferrer MD (10/21/20 17:33:33)                  Impression:      No acute abnormality.      Electronically signed by: Yoel Ferrre  Date:    10/21/2020  Time:    17:33             Narrative:    EXAMINATION:  XR CHEST 1 VIEW    CLINICAL HISTORY:  encounter for dialysis;    TECHNIQUE:  Single frontal view of the chest was performed.    COMPARISON:  None    FINDINGS:  The lungs are clear, with normal appearance of pulmonary vasculature and no pleural effusion or pneumothorax.    The cardiac silhouette is normal in size. There is aortic atherosclerosis.    Bones are intact.                                  Pending Diagnostic Studies:     None         Medications:  Reconciled Home Medications:      Medication List      START taking these medications    sevelamer carbonate 800 mg Tab  Commonly known as: RENVELA  Take 1 tablet (800 mg total) by mouth 3 (three) times daily with meals.  Start taking on: October 25, 2020        CHANGE how you take these medications    atorvastatin 80 MG tablet  Commonly known as: LIPITOR  Take 1 tablet (80 mg total) by mouth once daily.  What changed: how much to take        CONTINUE taking these medications    ACCU-CHEK ARDEN PLUS METER [facility]  Generic drug: blood-glucose meter  USE TO TEST TWICE DAILY     amLODIPine 10 MG tablet  Commonly known as: NORVASC  Take 1 tablet (10 mg total) by mouth once daily.     aspirin 81 MG EC tablet  Commonly known as: ECOTRIN  Take 1 tablet (81 mg total) by mouth once daily.     atropine 1% 1 % Drop  Commonly known as: ISOPTO ATROPINE  Place 1 drop into the left eye 3 (three) times daily.     BiDiL 20-37.5 mg Tab  Generic drug: isosorbide-hydrALAZINE 20-37.5 mg  TAKE 2 TABLETS BY MOUTH THREE TIMES DAILY     blood sugar diagnostic Strp  1 strip by Misc.(Non-Drug; Combo Route) route 3 (three) times daily. relion ultima     calcitRIOL 0.25 MCG Cap  Commonly known as: ROCALTROL  Take 1 capsule by mouth once daily     clopidogreL 75 mg tablet  Commonly known as: PLAVIX  TAKE 1 TABLET BY MOUTH ONCE DAILY      COMBIGAN 0.2-0.5 % Drop  Generic drug: brimonidine-timoloL  Place 1 drop into the left eye 2 (two) times a day.     fluticasone propionate 50 mcg/actuation nasal spray  Commonly known as: FLONASE  1 spray (50 mcg total) by Each Nostril route once daily.     furosemide 80 MG tablet  Commonly known as: LASIX  Take 1 tablet (80 mg total) by mouth 2 (two) times daily.     gabapentin 300 MG capsule  Commonly known as: NEURONTIN  Take 1 capsule (300 mg total) by mouth every evening.     insulin NPH-insulin regular (70/30) 100 unit/mL (70-30) injection  Commonly known as: NovoLIN 70/30 U-100 Insulin  INJECT SUBCUTANEOUSLY 30 UNITS IN THE MORNING AND INJECT 25 UNITS IN THE EVENING     lancets Misc  Commonly known as: ACCU-CHEK SOFTCLIX LANCETS  1 lancet by Misc.(Non-Drug; Combo Route) route 3 (three) times daily.     loratadine 10 mg tablet  Commonly known as: CLARITIN  Take 1 tablet (10 mg total) by mouth once daily.     * metoprolol tartrate 50 MG tablet  Commonly known as: LOPRESSOR  Take 1 tablet (50 mg total) by mouth 2 (two) times daily.     * metoprolol tartrate 25 MG tablet  Commonly known as: LOPRESSOR  Take 1 tablet by mouth twice daily     nitroGLYCERIN 0.4 MG SL tablet  Commonly known as: NITROSTAT  Place 1 tablet (0.4 mg total) under the tongue every 5 (five) minutes as needed for Chest pain.     ondansetron 4 MG Tbdl  Commonly known as: ZOFRAN-ODT  DISSOLVE 1 TABLET IN MOUTH THREE TIMES DAILY AS NEEDED FOR NAUSEA FOR 5 DAYS     sodium bicarbonate 325 MG tablet  Take 1 tablet (325 mg total) by mouth 3 (three) times daily.     torsemide 20 MG Tab  Commonly known as: DEMADEX  Take 2 tablets by mouth once daily         * This list has 2 medication(s) that are the same as other medications prescribed for you. Read the directions carefully, and ask your doctor or other care provider to review them with you.                Indwelling Lines/Drains at time of discharge:   Lines/Drains/Airways     Drain                  Hemodialysis AV Fistula 04/01/20 2203 Left upper arm 205 days                Time spent on the discharge of patient: > 35 minutes  Patient was seen and examined on the date of discharge and determined to be suitable for discharge.         Noman Drake NP  Department of Hospital Medicine  [facility]

## 2020-10-24 NOTE — PROGRESS NOTES
Patient received 3 hours of hemodialysis treatment. Epoetin 70823 units was given during Dialysis treatment. Net UF 1000   mls removed. No issues access noted.

## 2020-10-24 NOTE — SUBJECTIVE & OBJECTIVE
Interval History:     10/23/20:  Patient is currently on dialysis. She is tolerating the procedure well. Vital signs are stable. Patient's left AVF infiltrated during dialysis. She denies any complaints.     10/24/20:  Patient has no complaints at present. No access tenderness, no visible hematoma.           Review of patient's allergies indicates:   Allergen Reactions    Codeine Swelling    Darvon [propoxyphene] Swelling     Current Facility-Administered Medications   Medication Frequency    0.9%  NaCl infusion PRN    acetaminophen tablet 650 mg Q6H PRN    albuterol-ipratropium 2.5 mg-0.5 mg/3 mL nebulizer solution 3 mL Q4H PRN    amLODIPine tablet 5 mg Daily    aspirin EC tablet 81 mg Daily    atorvastatin tablet 40 mg Daily    calcitRIOL capsule 0.25 mcg Daily    clopidogreL tablet 75 mg Daily    dextrose 50% injection 12.5 g PRN    dextrose 50% injection 25 g PRN    diphenhydrAMINE capsule 25 mg Q6H PRN    epoetin roman-epbx injection 10,000 Units Once    fluticasone propionate 50 mcg/actuation nasal spray 50 mcg Daily    glucagon (human recombinant) injection 1 mg PRN    glucose chewable tablet 16 g PRN    glucose chewable tablet 24 g PRN    guaifenesin 100 mg/5 ml syrup 200 mg Q4H PRN    heparin (porcine) injection 2,000 Units Once    insulin aspart U-100 pen 0-5 Units QID (AC + HS) PRN    insulin detemir U-100 pen 20 Units QHS    isosorbide-hydrALAZINE 20-37.5 mg per tablet 2 tablet TID    labetaloL injection 10 mg Q6H PRN    metoprolol tartrate (LOPRESSOR) tablet 50 mg BID    ondansetron injection 4 mg Q8H PRN    sevelamer carbonate tablet 800 mg TID WM    vitamin renal formula (B-complex-vitamin c-folic acid) 1 mg per capsule 1 capsule Daily       Objective:     Vital Signs (Most Recent):  Temp: 98.2 °F (36.8 °C) (10/24/20 0733)  Pulse: 64 (10/24/20 0733)  Resp: 18 (10/24/20 0733)  BP: (!) 144/65 (10/24/20 0733)  SpO2: 98 % (10/24/20 0733)  O2 Device (Oxygen Therapy): room air  (10/23/20 0716) Vital Signs (24h Range):  Temp:  [97.5 °F (36.4 °C)-98.8 °F (37.1 °C)] 98.2 °F (36.8 °C)  Pulse:  [63-77] 64  Resp:  [16-18] 18  SpO2:  [98 %-100 %] 98 %  BP: (121-163)/(53-70) 144/65     Weight: 58.6 kg (129 lb 1.3 oz) (10/21/20 1624)  Body mass index is 21.48 kg/m².  Body surface area is 1.64 meters squared.    I/O last 3 completed shifts:  In: 1420 [P.O.:920; Other:500]  Out: 405 [Other:405]    Physical Exam  Constitutional:       Appearance: She is well-developed.   HENT:      Head: Normocephalic.   Eyes:      Pupils: Pupils are equal, round, and reactive to light.   Neck:      Thyroid: No thyromegaly.   Cardiovascular:      Rate and Rhythm: Normal rate and regular rhythm.      Heart sounds: No friction rub.   Pulmonary:      Effort: Pulmonary effort is normal.      Breath sounds: Normal breath sounds. No wheezing.   Chest:      Chest wall: No tenderness.   Abdominal:      General: Bowel sounds are normal. There is no distension.      Palpations: Abdomen is soft.      Tenderness: There is no abdominal tenderness.   Lymphadenopathy:      Cervical: No cervical adenopathy.   Skin:     General: Skin is warm and dry.      Findings: No rash.   Neurological:      Mental Status: She is alert and oriented to person, place, and time.         Significant Labs:  Lab Results   Component Value Date    CREATININE 4.8 (H) 10/24/2020    BUN 67 (H) 10/24/2020     10/24/2020    K 4.2 10/24/2020     10/24/2020    CO2 24 10/24/2020     Lab Results   Component Value Date    .0 (H) 04/08/2020    CALCIUM 8.7 10/24/2020    PHOS 6.0 (H) 10/22/2020     Lab Results   Component Value Date    ALBUMIN 3.3 (L) 10/24/2020     Lab Results   Component Value Date    WBC 5.08 10/24/2020    HGB 8.4 (L) 10/24/2020    HCT 25.7 (L) 10/24/2020    MCV 91 10/24/2020     10/24/2020       Recent Labs   Lab 10/22/20  0709   MG 1.9         Significant Imaging:  Imaging Results          X-Ray Chest 1 View (Final  result)  Result time 10/21/20 17:33:33    Final result by Yoel Ferrer MD (10/21/20 17:33:33)                 Impression:      No acute abnormality.      Electronically signed by: Yoel Ferrer  Date:    10/21/2020  Time:    17:33             Narrative:    EXAMINATION:  XR CHEST 1 VIEW    CLINICAL HISTORY:  encounter for dialysis;    TECHNIQUE:  Single frontal view of the chest was performed.    COMPARISON:  None    FINDINGS:  The lungs are clear, with normal appearance of pulmonary vasculature and no pleural effusion or pneumothorax.    The cardiac silhouette is normal in size. There is aortic atherosclerosis.    Bones are intact.

## 2020-10-24 NOTE — PLAN OF CARE
Fall prevention precautions maintained, pt remained free of falls throughout shift, call bell and personal items within reach, no complaints of pain at this time, ICE to LUE shunt site. 24 hour chart check completed. Will continue to monitor

## 2020-10-24 NOTE — ASSESSMENT & PLAN NOTE
1. ESRD , due to hypertension type 2 diabetes, diabetic nephropathy, patient was admitted to initiate hemodialysis, today is her 4th dialysis treatment, however, LUE AVF infiltrated on 10/23, access was re-evalauaed today and was non-tender, no visible hematoma. Plan is to resume dialysis today via AVF.     2.  Hypertension, acceptable BP control, monitor closely.     3.  Anemia of chronic kidney disease, continue Procrit on dialysis.    4.  Secondary hyperparathyroidism, continue calcitriol will continue Renvela with meals,    5.  Type 2 diabetes, management as per Medicine Team.    6.  Disposition, d/c home when clinically stable.

## 2020-10-25 NOTE — PROGRESS NOTES
Pt given discharge instructions and paperwork, Iv removed, pt verbalizes understanding of all DC information. Pt awaiting daughter to pick her up.

## 2020-10-25 NOTE — NURSING
Patient in stable condition, discharge via wheelchair to her personal car accompanied by family member.

## 2020-10-26 ENCOUNTER — PATIENT OUTREACH (OUTPATIENT)
Dept: ADMINISTRATIVE | Facility: CLINIC | Age: 75
End: 2020-10-26

## 2020-10-26 LAB — HBA1C MFR BLD: 9 %

## 2020-10-26 NOTE — PLAN OF CARE
10/26/20 0830   Final Note   Assessment Type Final Discharge Note   Anticipated Discharge Disposition Home   Right Care Referral Info   Post Acute Recommendation No Care   Post-Acute Status   Post-Acute Authorization Dialysis   Diaylsis Status Set-up Complete       Jamie Bhatt LMSW 10/26/2020 8:30 AM

## 2020-10-26 NOTE — PROGRESS NOTES
C3 nurse attempted to contact patient. The following occurred:   C3 nurse attempted to contact Avril Monzon  for a TCC post hospital discharge follow up call. The patient is unable to conduct the call @ this time. The patient requested a callback.    The patient does not have a scheduled HOSFU appointment within 7-14 days post hospital discharge date 10/24. Message sent to Physician staff to assist with HOSFU appointment scheduling.

## 2020-10-27 NOTE — PROGRESS NOTES
Subjective:      Patient ID: Avril Monzon is a 75 y.o. female.    Chief Complaint: No chief complaint on file.      HPI  Here for follow up of medical problems and hospital follow up for 10/24/20 d/c for:  -------------------------------------------------------------------------------------  Admitting diagnosis:  New onset ESRD, to be initiated on hemodialysis this hospitalization.        * No surgery found *       Hospital Course:   10/23/20 The patient denies any issues or complaints overnight. The patient is receiving her 3rd dialysis treatment today. The case was discussed with Dr. Hernandez (Nephrology) who reports that the patients access infiltrated during dialysis. Will plan to rest access overnight. Will plan for HD tomorrow, if unable to use AV graft will need a vascath placed. 10/24/20 No acute issues overnight. The case was discussed with Nephrology who felt the patient can discharge if there were no issues with her AV graft. The patient was dialyzed today without issue. The patient was seen and examined today and deemed stable for discharge. The patient will continue outpatient dialysis at [facility]. Her chair time is MWF for 11:00am. Pt will begin Dialysis on Monday 10/26/2020. She will follow up with her PCP and with Nephrology.     -------------------------------------------------------------------------------------------  Started HD 1 week ago.  Sugars running high, 200 AC breakfast.  Takes 70/30 Novalog 30u in the morning, none in PM.  Having nocturnal leg cramps x 2 years.  No f/c/sw/cough.  No cp/sob/palp.  Gets tired easily with exertion.  BMs ok with occasional dulcolax.  Makes urine, no dysuria.    7/20 ECHO:  · Concentric left ventricular hypertrophy.  · Normal left ventricular systolic function. The estimated ejection fraction is 60%.  · Grade II (moderate) left ventricular diastolic dysfunction consistent with pseudonormalization.  · Normal right ventricular systolic  "function.  · Mild mitral regurgitation.  · Mild to moderate tricuspid regurgitation.  · Mild pulmonic regurgitation.  · Moderate mitral prolapse of the anterior mitral leaflet.  · Normal central venous pressure (3 mmHg).  · The estimated PA systolic pressure is 68 mmHg.  · Pulmonary hypertension present.           Updated/ not seen since 3/19:  HM: 10/20 fluvax, 1/16 [id number], 10/20 today booster [id number], 6/12 TDaP, 12/18 mmg, Cscope about 6y ago outside, 3/19 Eye Dr. Coello.     Review of Systems   Constitutional: Negative for chills, diaphoresis and fever.   Respiratory: Negative for cough and shortness of breath.    Cardiovascular: Negative for chest pain, palpitations and leg swelling.   Gastrointestinal: Negative for blood in stool, constipation, diarrhea, nausea and vomiting.   Genitourinary: Negative for dysuria, frequency and hematuria.   Psychiatric/Behavioral: The patient is not nervous/anxious.          Objective:   BP (!) 102/44   Pulse 81   Temp 98.6 °F (37 °C) (Temporal)   Ht 5' 5" (1.651 m)   Wt 60.4 kg (133 lb 2.5 oz)   LMP  (LMP Unknown)   SpO2 98%   BMI 22.16 kg/m²     Physical Exam  Constitutional:       Appearance: She is well-developed.   HENT:      Right Ear: External ear normal. Tympanic membrane is not injected.      Left Ear: External ear normal. Tympanic membrane is not injected.   Eyes:      Conjunctiva/sclera: Conjunctivae normal.   Neck:      Musculoskeletal: Normal range of motion and neck supple.      Thyroid: No thyroid mass or thyromegaly.      Vascular: No carotid bruit.   Cardiovascular:      Rate and Rhythm: Normal rate and regular rhythm.      Heart sounds: No murmur. No friction rub. No gallop.    Pulmonary:      Effort: Pulmonary effort is normal.      Breath sounds: Normal breath sounds. No wheezing or rales.   Chest:      Breasts:         Right: No mass, nipple discharge, skin change or tenderness.         Left: No mass, nipple discharge, skin change or tenderness. "   Abdominal:      General: Bowel sounds are normal.      Palpations: Abdomen is soft. There is no mass.      Tenderness: There is no abdominal tenderness.   Lymphadenopathy:      Cervical: No cervical adenopathy.   Skin:     General: Skin is warm.      Findings: No rash.   Neurological:      Mental Status: She is alert and oriented to person, place, and time.       Results for SERVANDO BUSTILLO (MRN [id number]) as of 10/29/2020 10:56   Ref. Range 10/21/2020 17:10   Hemoglobin A1C External Latest Ref Range: 4.0 - 5.6 % 9.3 (H)   Estimated Avg Glucose Latest Ref Range: 68 - 131 mg/dL 220 (H)   .      Assessment:       1. Diabetes mellitus due to underlying condition with chronic kidney disease on chronic dialysis, with long-term current use of insulin    2. Encounter for screening mammogram for malignant neoplasm of breast     3. Anemia due to stage 5 chronic kidney disease    4. Hyperlipidemia associated with type 2 diabetes mellitus    5. DM type 2 with diabetic peripheral neuropathy    6. Vitamin D deficiency    7. History of CVA (cerebrovascular accident)    8. Preventive measure    9. Nocturnal leg cramps    10. Hypertension associated with diabetes          Plan:     Diabetes mellitus due to underlying condition with diabetic peripheral neuropathy, chronic kidney disease on chronic dialysis, with long-term current use of insulin- add dinner 10u.  Monitor sugars more regularly and send to me.  Recheck gHb in 3mo.  -     insulin NPH-insulin regular, 70/30, (NOVOLIN 70/30 U-100 INSULIN) 100 unit/mL (70-30) injection; INJECT SUBCUTANEOUSLY 30 UNITS IN THE MORNING AND INJECT 10 UNITS IN THE EVENING  Dispense: 2 vial; Refill: 3  -     Hemoglobin A1C; Future; Expected date: 10/29/2020    Anemia due to stage 5 chronic kidney disease    Hyperlipidemia associated with type 2 diabetes mellitus- cont statin, lab in 3mo.  -     TSH; Future; Expected date: 10/29/2020  -     Lipid Panel; Future; Expected date: 10/29/2020    Vitamin  D deficiency  -     Vitamin D; Future    History of CVA (cerebrovascular accident)    Preventive measure- [id number] booster.  MMG, lab in 3mo.  -     Mammo Digital Screening Bilat w/ Art; Future; Expected date: 10/29/2020  -     TSH; Future; Expected date: 10/29/2020  -     Hemoglobin A1C; Future; Expected date: 10/29/2020  -     Lipid Panel; Future; Expected date: 10/29/2020  -     Vitamin D; Future  -     Pneumococcal Polysaccharide Vaccine (23 Valent) (SQ/IM)    Nocturnal leg cramps- start 2 Tums at hs.    Hypertension associated with diabetes- decrease amlodipine to 5mg daily.  Monitor BPs and send to me in 1-2 weeks.  -     amLODIPine (NORVASC) 10 MG tablet; Take 0.5 tablets (5 mg total) by mouth once daily.  Dispense: 90 tablet; Refill: 1    RTC 3 mo.

## 2020-10-27 NOTE — PATIENT INSTRUCTIONS
Hemodialysis    The job of the kidneys is to remove waste and extra water from the body. The kidneys also balance levels of minerals in the body, called electrolytes. Kidneys are essential for the health of the body. People with severe kidney disease are not able to perform these functions. This may be due to a temporary or a permanent kidney condition. Hemodialysis is a treatment that takes over the essential functions of the kidney until you recover from kidney disease, or get a kidney transplant. If you have end stage renal disease and are not eligible for a transplant, you will need to have hemodialysis for the rest of your life. Peritoneal dialysis is another type of dialysis but is not covered in this article.  Hemodialysis requires access to a strong blood flow. There are 3 ways to do this.   · Dialysis catheter. This is a plastic tube put into a large blood vessel, usually for temporary access. If there is no other option, it may be used permanently.  · AV fistula. Through a minor surgical procedure, an artery in your arm is joined directly to a vein. This creates an arteriovenous fistula or AV fistula. The pressure of the arterial blood flow slowly expands the size of the attached vein. It may take up to 4 to 6 weeks for the fistula to enlarge enough to be ready for dialysis. The AV fistula is the access of choice. This is because there are typically fewer problems and side effects. It also lasts longer than the other options.  · AV graft. This is an artificial implant that joins an artery and vein in people with small veins. It can also be used when an AV fistula did not work. It can be used for dialysis a few weeks after the surgery.  A connecting tube carries blood from the access point in the body to the dialysis machine where special filters called dialyzers filter and process the blood. The blood is then returned to the body through a separate tube and needle. This takes 3 to 4 hours and is usually  done 3 times a week. Home dialysis is an option for some patients.  Hemodialysis is lifesaving for people with kidney failure, but there are possible side effects. These include infection, low blood pressure, bleeding, electrolyte imbalance, anemia, and heart disease.  Home care  The following guidelines will help you care for yourself at home:  · Take any medicines as directed.  · Follow the special diet for kidney failure that your healthcare provider gave you.  · Dont wear any clothing or jewelry that could put pressure on the access site.  · Dont lie on the access site while sleeping.  · If the access is in your arm, have blood pressure readings and blood samples taken from the other arm.  · Check the access site after dialysis for swelling, bleeding, or signs of infection.  · If you have an AV fistula or graft, check the site regularly to be sure you can always feel the vibration (called the thrill) of blood flowing from artery to vein. Dont put lotions or other products on the access site.  · If you have an external dialysis catheter, avoid physical activities that could pull on the catheter.  Follow-up care  Follow up with your healthcare provider, or as advised.  Call 911  Call 911 if any of these occur:  · External catheter starts bleeding or opens to air  · Severe weakness, dizziness, fainting, drowsiness, or confusion  · Chest pain or shortness of breath  When to seek medical advice  Call your healthcare provider right away if any of these occur:  · Color of the blood in the external tubing changes from bright red to dark red  · You dont feel the thrill (vibration) in an AV fistula or graft  · Nausea or vomiting  · Unexpected weight gain or swelling in the legs, ankles, or around the eyes  · Decreased or absent urine output if you previously made urine  · Fever of 100.4ºF (38ºC) or higher, or as directed by your healthcare provider  Date Last Reviewed: 10/1/2016  © 9683-8667 [employer] [address] [place] [address]. All rights reserved. This information is not intended as a substitute for professional medical care. Always follow your healthcare professional's instructions.

## 2020-10-28 NOTE — PHYSICIAN QUERY
PT Name: Avril Monzon  MR #: [id number]    Hypertensive Condition Clarification      CDS/: Pily CURTIS, RN-BC  Contact information: [email or web address]    This form is a permanent document in the medical record.     Query Date: October 28, 2020    By submitting this query, we are merely seeking further clarification of documentation. Please utilize your independent clinical judgment when addressing the question(s) below.    The Medical Record contains the following:   Indicators   Supporting Clinical Findings Location in Medical Record   X Hypertension associated with diabetes documented Hypertension associated with diabetes [facility] Medicine, H&P, 10/21-10/24   X Chronic condition(s) ESRD, CAD, Anemia in CKD, Hyperparathyroidism, T2DM with hyperglycemia, HTN [facility] Medicine, H&P, 10/21   X Vital signs T 98.3       HR 77       RR 20     /74     98.3              72             20           168/64     98.5              73             18           164/74     98.5              66             18           131/56 VS 10/21-10/22   X Treatment/Medication Continue home dose antihypertensives.  Will monitor and make adjustments as needed.        Norvasc   Isosorbide-hydralazine 20-37.5 two tablets TID  Metoprolol 50 mg daily [facility] Medicine, H&P, 10/21          10/22-10/23 MAR  10/22-10/24  10/22-10/24    Other     Provider, please clarify the relationship between the Hypertension and Diabetes Mellitus  [   ] Hypertension is a manifestation of Diabetes Mellitus (Secondary Hypertension)   [xx   ]  Hypertension is not a manifestation of Diabetes Mellitus (Essential Hypertension)   [   ] Other Clarification (please specify): ____________   [  ] Clinically Undetermined       Please document in your progress notes daily for the duration of treatment, until resolved, and include in your discharge summary.

## 2020-10-28 NOTE — PHYSICIAN QUERY
"PT Name: Avril Monzon  MR #: [id number]    Physician Query Form - Heart  Condition Clarification     CDS/: Pily CURTIS, RN-BC  Contact information: [email or web address]  This form is a permanent document in the medical record.     Query Date: October 28, 2020    By submitting this query, we are merely seeking further clarification of documentation. Please utilize your independent clinical judgment when addressing the question(s) below.    The medical record contains the following   Indicators     Supporting Clinical Findings Location in Medical Record    BNP     X EF 60% Echo 7/2-prior to current hospitalization   X Radiology findings No acute abnormality CXR 10/21   X Echo Results Concentric left ventricular hypertrophy.  Normal left ventricular systolic function. The estimated ejection fraction is 60%.  Grade II (moderate) left ventricular diastolic dysfunction consistent with pseudonormalization.  Normal right ventricular systolic function.  Mild mitral regurgitation.  Mild to moderate tricuspid regurgitation.  Mild pulmonic regurgitation.  Moderate mitral prolapse of the anterior mitral leaflet.  Normal central venous pressure (3 mmHg).  The estimated PA systolic pressure is 68 mmHg.  Pulmonary hypertension present.      Echo 7/2-prior to current hospitalization    "Ascites" documented      "SOB" or "ALVARADO" documented      "Hypoxia" documented     X Heart Failure documented Heart failure    Heart failure-past medical history grid ED Provider Notes, 10/21    Hospital Medicine, H&P 10/21    "Edema" documented     X Diuretics/Meds Norvasc  Bidil 20-37.5 mg  Metoprolol   Lasix 80 mg bid  Demadex 20 mg two tablets daily Home medications, Hospital Medicine, H&P, 10/21    Treatment:      Other:      Heart failure (HF) can be acute, chronic or both. It is generally further specificed as systolic, diastolic, or combined. Lastly, it is important to identify an underlying etiology if known or suspected.     Common clues " to acute exacerbation:  Rapidly progressive symptoms (w/in 2 weeks of presentation), using IV diuretics to treat, using supplemental O2, pulmonary edema on Xray, MI w/in 4 weeks, and/or BNP >500    Systolic Heart Failure: is defined as chart documentation of a left ventricular ejection fraction (LVEF) less than 40%     Diastolic Heart Failure: is defined as a left ventricular ejection fraction (LVEF) greater than 40%   +      Evidence of diastolic dysfunction on echocardiography OR    Right heart catheterization wedge pressure above 12 mm Hg OR    Left heart catheterization left ventricular end diastolic pressure 18 mm Hg or above.    References: *American Heart Association    The clinical guidelines noted below are only system guidelines, and do not replace the providers clinical judgment.     Provider, please specify the diagnosis associated with above clinical findings  [ xx  ] Chronic Diastolic Heart Failure - Pre-existing diastolic HF diagnosis.  EF > 40%  without  acute HF symptoms documented   [   ] Other Type of Heart Failure (please specify type):   [   ] Other (please specify):   [  ] Clinically Undetermined                           Please document in your progress notes daily for the duration of treatment until resolved and include in your discharge summary.

## 2020-10-29 ENCOUNTER — OFFICE VISIT (OUTPATIENT)
Dept: FAMILY MEDICINE | Facility: CLINIC | Age: 75
End: 2020-10-29
Payer: MEDICARE

## 2020-10-29 VITALS
BODY MASS INDEX: 22.19 KG/M2 | DIASTOLIC BLOOD PRESSURE: 44 MMHG | HEART RATE: 81 BPM | TEMPERATURE: 99 F | SYSTOLIC BLOOD PRESSURE: 102 MMHG | HEIGHT: 65 IN | OXYGEN SATURATION: 98 % | WEIGHT: 133.19 LBS

## 2020-10-29 DIAGNOSIS — Z29.9 PREVENTIVE MEASURE: ICD-10-CM

## 2020-10-29 DIAGNOSIS — E11.59 HYPERTENSION ASSOCIATED WITH DIABETES: ICD-10-CM

## 2020-10-29 DIAGNOSIS — N18.6 DIABETES MELLITUS DUE TO UNDERLYING CONDITION WITH CHRONIC KIDNEY DISEASE ON CHRONIC DIALYSIS, WITH LONG-TERM CURRENT USE OF INSULIN: Primary | ICD-10-CM

## 2020-10-29 DIAGNOSIS — D63.1 ANEMIA DUE TO STAGE 5 CHRONIC KIDNEY DISEASE: ICD-10-CM

## 2020-10-29 DIAGNOSIS — N18.5 ANEMIA DUE TO STAGE 5 CHRONIC KIDNEY DISEASE: ICD-10-CM

## 2020-10-29 DIAGNOSIS — E08.22 DIABETES MELLITUS DUE TO UNDERLYING CONDITION WITH CHRONIC KIDNEY DISEASE ON CHRONIC DIALYSIS, WITH LONG-TERM CURRENT USE OF INSULIN: Primary | ICD-10-CM

## 2020-10-29 DIAGNOSIS — G47.62 NOCTURNAL LEG CRAMPS: ICD-10-CM

## 2020-10-29 DIAGNOSIS — Z12.31 ENCOUNTER FOR SCREENING MAMMOGRAM FOR MALIGNANT NEOPLASM OF BREAST: ICD-10-CM

## 2020-10-29 DIAGNOSIS — Z79.4 DIABETES MELLITUS DUE TO UNDERLYING CONDITION WITH CHRONIC KIDNEY DISEASE ON CHRONIC DIALYSIS, WITH LONG-TERM CURRENT USE OF INSULIN: Primary | ICD-10-CM

## 2020-10-29 DIAGNOSIS — Z86.73 HISTORY OF CVA (CEREBROVASCULAR ACCIDENT): ICD-10-CM

## 2020-10-29 DIAGNOSIS — E55.9 VITAMIN D DEFICIENCY: ICD-10-CM

## 2020-10-29 DIAGNOSIS — E78.5 HYPERLIPIDEMIA ASSOCIATED WITH TYPE 2 DIABETES MELLITUS: ICD-10-CM

## 2020-10-29 DIAGNOSIS — E11.42 DM TYPE 2 WITH DIABETIC PERIPHERAL NEUROPATHY: ICD-10-CM

## 2020-10-29 DIAGNOSIS — Z99.2 DIABETES MELLITUS DUE TO UNDERLYING CONDITION WITH CHRONIC KIDNEY DISEASE ON CHRONIC DIALYSIS, WITH LONG-TERM CURRENT USE OF INSULIN: Primary | ICD-10-CM

## 2020-10-29 DIAGNOSIS — E11.69 HYPERLIPIDEMIA ASSOCIATED WITH TYPE 2 DIABETES MELLITUS: ICD-10-CM

## 2020-10-29 DIAGNOSIS — I15.2 HYPERTENSION ASSOCIATED WITH DIABETES: ICD-10-CM

## 2020-10-29 PROCEDURE — 90732 PPSV23 VACC 2 YRS+ SUBQ/IM: CPT | Mod: HCNC,S$GLB,, | Performed by: INTERNAL MEDICINE

## 2020-10-29 PROCEDURE — G0009 ADMIN PNEUMOCOCCAL VACCINE: HCPCS | Mod: HCNC,S$GLB,, | Performed by: INTERNAL MEDICINE

## 2020-10-29 PROCEDURE — 99214 OFFICE O/P EST MOD 30 MIN: CPT | Mod: 25,HCNC,S$GLB, | Performed by: INTERNAL MEDICINE

## 2020-10-29 PROCEDURE — 99999 PR PBB SHADOW E&M-EST. PATIENT-LVL V: ICD-10-PCS | Mod: PBBFAC,HCNC,, | Performed by: INTERNAL MEDICINE

## 2020-10-29 PROCEDURE — 99999 PR PBB SHADOW E&M-EST. PATIENT-LVL V: CPT | Mod: PBBFAC,HCNC,, | Performed by: INTERNAL MEDICINE

## 2020-10-29 PROCEDURE — 99214 PR OFFICE/OUTPT VISIT, EST, LEVL IV, 30-39 MIN: ICD-10-PCS | Mod: 25,HCNC,S$GLB, | Performed by: INTERNAL MEDICINE

## 2020-10-29 PROCEDURE — G0009 PNEUMOCOCCAL POLYSACCHARIDE VACCINE 23-VALENT =>2YO SQ IM: ICD-10-PCS | Mod: HCNC,S$GLB,, | Performed by: INTERNAL MEDICINE

## 2020-10-29 PROCEDURE — 90732 PNEUMOCOCCAL POLYSACCHARIDE VACCINE 23-VALENT =>2YO SQ IM: ICD-10-PCS | Mod: HCNC,S$GLB,, | Performed by: INTERNAL MEDICINE

## 2020-10-29 RX ORDER — AMLODIPINE BESYLATE 10 MG/1
5 TABLET ORAL DAILY
Qty: 90 TABLET | Refills: 1 | Status: SHIPPED | OUTPATIENT
Start: 2020-10-29 | End: 2020-11-17

## 2020-10-29 NOTE — PROGRESS NOTES
"Transitional Care Note  Subjective:      Patient ID: Avril Monzon is a 75 y.o. female.    Chief Complaint: No chief complaint on file.    Family and/or Caretaker present at visit?  No.  Diagnostic tests reviewed/disposition: I have reviewed all completed as well as pending diagnostic tests at the time of discharge.  Disease/illness education:   Home health/community services discussion/referrals: Patient does not have home health established from hospital visit.  They do not need home health.  If needed, we will set up home health for the patient.   Establishment or re-establishment of referral orders for community resources: No other necessary community resources.   Discussion with other health care providers: No discussion with other health care providers necessary.       HPI    Review of Systems      Objective:   BP (!) 102/44   Pulse 81   Temp 98.6 °F (37 °C) (Temporal)   Ht 5' 5" (1.651 m)   Wt 60.4 kg (133 lb 2.5 oz)   LMP  (LMP Unknown)   SpO2 98%   BMI 22.16 kg/m²     Physical Exam      Assessment:       1. Diabetes mellitus due to underlying condition with chronic kidney disease on chronic dialysis, with long-term current use of insulin    2. Encounter for screening mammogram for malignant neoplasm of breast     3. Anemia due to stage 5 chronic kidney disease    4. Hyperlipidemia associated with type 2 diabetes mellitus    5. DM type 2 with diabetic peripheral neuropathy    6. Vitamin D deficiency    7. History of CVA (cerebrovascular accident)    8. Preventive measure    9. Nocturnal leg cramps    10. Hypertension associated with diabetes          Plan:     See attached note.      "

## 2020-11-12 ENCOUNTER — TELEPHONE (OUTPATIENT)
Dept: HEMATOLOGY/ONCOLOGY | Facility: CLINIC | Age: 75
End: 2020-11-12

## 2020-11-12 NOTE — TELEPHONE ENCOUNTER
Spoke to pt about upcoming hem/onc appt 11/19. Explained my role as nurse navigator. Asked if she would like to see NP on 11/7 while already at the [place] location for cardio appt, pt agreeable. Appt made 11/17 @ 10am with CHIP Archibald NP. Pt asked if she could do bloodwork today, 11/12 at the [place], appt r/s to 11/12 @ 2pm. Voiced understanding information. Knows to call with questions/concerns.

## 2020-11-16 ENCOUNTER — HOSPITAL ENCOUNTER (EMERGENCY)
Facility: HOSPITAL | Age: 75
Discharge: HOME | End: 2020-11-16
Attending: EMERGENCY MEDICINE
Payer: MEDICARE

## 2020-11-16 VITALS
BODY MASS INDEX: 20.98 KG/M2 | OXYGEN SATURATION: 100 % | HEART RATE: 76 BPM | RESPIRATION RATE: 20 BRPM | WEIGHT: 126.13 LBS | DIASTOLIC BLOOD PRESSURE: 69 MMHG | SYSTOLIC BLOOD PRESSURE: 145 MMHG | TEMPERATURE: 98 F

## 2020-11-16 DIAGNOSIS — N18.6 ESRD ON DIALYSIS: ICD-10-CM

## 2020-11-16 DIAGNOSIS — I95.1 ORTHOSTATIC HYPOTENSION: Primary | ICD-10-CM

## 2020-11-16 DIAGNOSIS — N18.5 ANEMIA DUE TO STAGE 5 CHRONIC KIDNEY DISEASE: Primary | ICD-10-CM

## 2020-11-16 DIAGNOSIS — Z99.2 ESRD ON DIALYSIS: ICD-10-CM

## 2020-11-16 DIAGNOSIS — D63.1 ANEMIA DUE TO STAGE 5 CHRONIC KIDNEY DISEASE: Primary | ICD-10-CM

## 2020-11-16 DIAGNOSIS — R53.1 WEAKNESS: ICD-10-CM

## 2020-11-16 LAB
ALBUMIN SERPL BCP-MCNC: 3.5 G/DL (ref 3.5–5.2)
ALP SERPL-CCNC: 69 U/L (ref 55–135)
ALT SERPL W/O P-5'-P-CCNC: 10 U/L (ref 10–44)
ANION GAP SERPL CALC-SCNC: 8 MMOL/L (ref 8–16)
AST SERPL-CCNC: 14 U/L (ref 10–40)
BASOPHILS # BLD AUTO: 0.01 K/UL (ref 0–0.2)
BASOPHILS NFR BLD: 0.1 % (ref 0–1.9)
BILIRUB SERPL-MCNC: 0.3 MG/DL (ref 0.1–1)
BUN SERPL-MCNC: 41 MG/DL (ref 8–23)
CALCIUM SERPL-MCNC: 8.8 MG/DL (ref 8.7–10.5)
CHLORIDE SERPL-SCNC: 98 MMOL/L (ref 95–110)
CO2 SERPL-SCNC: 30 MMOL/L (ref 23–29)
CREAT SERPL-MCNC: 4.2 MG/DL (ref 0.5–1.4)
DIFFERENTIAL METHOD: ABNORMAL
EOSINOPHIL # BLD AUTO: 0 K/UL (ref 0–0.5)
EOSINOPHIL NFR BLD: 0.3 % (ref 0–8)
ERYTHROCYTE [DISTWIDTH] IN BLOOD BY AUTOMATED COUNT: 14.6 % (ref 11.5–14.5)
EST. GFR  (AFRICAN AMERICAN): 11 ML/MIN/1.73 M^2
EST. GFR  (NON AFRICAN AMERICAN): 10 ML/MIN/1.73 M^2
GLUCOSE SERPL-MCNC: 305 MG/DL (ref 70–110)
HCT VFR BLD AUTO: 26.3 % (ref 37–48.5)
HCV AB SERPL QL IA: NEGATIVE
HGB BLD-MCNC: 8.1 G/DL (ref 12–16)
IMM GRANULOCYTES # BLD AUTO: 0.02 K/UL (ref 0–0.04)
IMM GRANULOCYTES NFR BLD AUTO: 0.3 % (ref 0–0.5)
LYMPHOCYTES # BLD AUTO: 0.5 K/UL (ref 1–4.8)
LYMPHOCYTES NFR BLD: 7 % (ref 18–48)
MCH RBC QN AUTO: 30.7 PG (ref 27–31)
MCHC RBC AUTO-ENTMCNC: 30.8 G/DL (ref 32–36)
MCV RBC AUTO: 100 FL (ref 82–98)
MONOCYTES # BLD AUTO: 0.3 K/UL (ref 0.3–1)
MONOCYTES NFR BLD: 4.9 % (ref 4–15)
NEUTROPHILS # BLD AUTO: 6.1 K/UL (ref 1.8–7.7)
NEUTROPHILS NFR BLD: 87.4 % (ref 38–73)
NRBC BLD-RTO: 0 /100 WBC
PLATELET # BLD AUTO: 239 K/UL (ref 150–350)
PMV BLD AUTO: 9.5 FL (ref 9.2–12.9)
POTASSIUM SERPL-SCNC: 4.7 MMOL/L (ref 3.5–5.1)
PROT SERPL-MCNC: 6.3 G/DL (ref 6–8.4)
RBC # BLD AUTO: 2.64 M/UL (ref 4–5.4)
SODIUM SERPL-SCNC: 136 MMOL/L (ref 136–145)
WBC # BLD AUTO: 6.98 K/UL (ref 3.9–12.7)

## 2020-11-16 PROCEDURE — 99284 EMERGENCY DEPT VISIT MOD MDM: CPT | Mod: 25,HCNC

## 2020-11-16 PROCEDURE — 93005 ELECTROCARDIOGRAM TRACING: CPT | Mod: HCNC

## 2020-11-16 PROCEDURE — 86803 HEPATITIS C AB TEST: CPT | Mod: HCNC

## 2020-11-16 PROCEDURE — 93010 ELECTROCARDIOGRAM REPORT: CPT | Mod: HCNC,,, | Performed by: INTERNAL MEDICINE

## 2020-11-16 PROCEDURE — 85025 COMPLETE CBC W/AUTO DIFF WBC: CPT | Mod: HCNC

## 2020-11-16 PROCEDURE — 93010 EKG 12-LEAD: ICD-10-PCS | Mod: HCNC,,, | Performed by: INTERNAL MEDICINE

## 2020-11-16 PROCEDURE — 80053 COMPREHEN METABOLIC PANEL: CPT | Mod: HCNC

## 2020-11-16 NOTE — ED PROVIDER NOTES
SCRIBE #1 NOTE: I, Jeromy Mendiola, am scribing for, and in the presence of, Lizzette Holder MD. I have scribed the entire note.      History      Chief Complaint   Patient presents with    Fatigue     weakness and hypotension.  dialysis today       Review of patient's allergies indicates:   Allergen Reactions    Codeine Swelling    Darvon [propoxyphene] Swelling        HPI   HPI    11/16/2020, 4:26 PM   History obtained from the patient      History of Present Illness: Avril Monzon is a 75 y.o. female patient with a PMHx of ESRD who presents to the Emergency Department for fatigue, onset this morning after dialysis. Pt states that she became hypotensive during dialysis this morning, and was sent home after completing 2 hours of dialysis. Pt states that her fatigue has significantly improved since initial onset. Symptoms are constant. No mitigating or exacerbating factors reported. Associated sxs include nausea (resolved PTA). Patient denies any fever, chills, vomiting, SOB, CP, weakness, numbness, dizziness, headache, and all other sxs at this time. No prior Tx reported. No further complaints or concerns at this time.     Arrival mode: EMS    PCP: Rose Parks MD       Past Medical History:  Past Medical History:   Diagnosis Date    Acute hypoxemic respiratory failure 11/26/2018    Anemia     Arthritis     back, hand, knees    Back pain     Cataract     CKD stage G4/A3, GFR 15 - 29 and albumin creatinine ratio >300 mg/g     GFR 40% Jan 2014 and 33% ub 3/2014 ([facility])    Coronary artery disease involving native coronary artery of native heart 11/30/2018    Diabetic retinopathy     DM (diabetes mellitus) type II controlled, neurological manifestation     Exudative age-related macular degeneration of left eye with active choroidal neovascularization 2/5/2019    GERD (gastroesophageal reflux disease)     Glaucoma     Heart failure     Hyperlipidemia     Hypertension     Non-ST elevation MI  (NSTEMI) 2018    NSTEMI (non-ST elevated myocardial infarction) 2018    Peripheral neuropathy     Polyneuropathy     Proteinuria     >6 mo     Seizures     [facility] 2014 -dick CT NRI showed small vessel    Stroke     Tobacco dependence     Type 2 diabetes with peripheral circulatory disorder, controlled        Past Surgical History:  Past Surgical History:   Procedure Laterality Date    BREAST LUMPECTOMY Left     benign, when patient was 15 years old    BREAST MASS EXCISION      ,benign, age 15.    CATHETERIZATION OF BOTH LEFT AND RIGHT HEART N/A 2018    Procedure: CATHETERIZATION, HEART, BOTH LEFT AND RIGHT;  Surgeon: Michelle Holman MD;  Location: [facility] CATH LAB;  Service: Cardiology;  Laterality: N/A;    CATHETERIZATION OF BOTH LEFT AND RIGHT HEART N/A 2018    Procedure: CATHETERIZATION, HEART, BOTH LEFT AND RIGHT;  Surgeon: Michelle Holman MD;  Location: [facility] CATH LAB;  Service: Cardiology;  Laterality: N/A;    HYSTERECTOMY  1982    INSERTION OF SHUNT      LEFT HEART CATHETERIZATION Left 12/3/2018    Procedure: CATHETERIZATION, HEART, LEFT;  Surgeon: Michelle Holman MD;  Location: [facility] CATH LAB;  Service: Cardiology;  Laterality: Left;  LAD ATHERECTOMY STENT/ FEMORAL APPROACH    OOPHORECTOMY      wrist cyst Right 1980s    dorsal wrist cyst         Family History:  Family History   Problem Relation Age of Onset    Diabetes Mother     Hyperlipidemia Mother     Hypertension Mother     Heart disease Mother         MI    Muscular dystrophy Son     Cancer Maternal Grandmother         colon       Social History:  Social History     Tobacco Use    Smoking status: Former Smoker     Packs/day: 1.50     Years: 30.00     Pack years: 45.00     Types: Cigarettes     Quit date: 1990     Years since quittin.8    Smokeless tobacco: Former User   Substance and Sexual Activity    Alcohol use: No     Alcohol/week: 0.0 standard drinks    Drug use: No    Sexual  activity: Never       ROS   Review of Systems   Constitutional: Positive for fatigue (significantly improved since onset). Negative for chills and fever.   HENT: Negative for sore throat.    Respiratory: Negative for shortness of breath.    Cardiovascular: Negative for chest pain.   Gastrointestinal: Positive for nausea (resolved PTA). Negative for diarrhea and vomiting.   Genitourinary: Negative for dysuria.   Musculoskeletal: Negative for back pain.   Skin: Negative for rash.   Neurological: Negative for dizziness, seizures, weakness, light-headedness, numbness and headaches.   Hematological: Does not bruise/bleed easily.   All other systems reviewed and are negative.    Physical Exam      Initial Vitals [11/16/20 1549]   BP Pulse Resp Temp SpO2   115/60 (!) 115 18 98.2 °F (36.8 °C) 100 %      MAP       --          Physical Exam  Nursing Notes and Vital Signs Reviewed.  Constitutional: Patient is in no acute distress. Well-developed and well-nourished.  Head: Atraumatic. Normocephalic.  Eyes: PERRL. EOM intact. Conjunctivae are not pale. No scleral icterus.  ENT: Mucous membranes are moist. Oropharynx is clear and symmetric.    Neck: Supple. Full ROM. No lymphadenopathy.  Cardiovascular: Tachycardic. Regular rhythm. No murmurs, rubs, or gallops. Distal pulses are 2+ and symmetric.  Pulmonary/Chest: No respiratory distress. Clear to auscultation bilaterally. No wheezing or rales.  Abdominal: Soft and non-distended.  There is no tenderness.  No rebound, guarding, or rigidity.   Musculoskeletal: Moves all extremities. No obvious deformities. No edema.  Skin: Warm and dry.  Neurological:  Alert, awake, and appropriate.  Normal speech.  No acute focal neurological deficits are appreciated.  Psychiatric: Normal affect. Good eye contact. Appropriate in content.    ED Course    Procedures  ED Vital Signs:  Vitals:    11/16/20 1549 11/16/20 1603 11/16/20 1604 11/16/20 1632   BP: 115/60 134/89  (!) 177/73   Pulse: (!) 115  70 74 68   Resp: 18 20     Temp: 98.2 °F (36.8 °C)      TempSrc: Oral      SpO2: 100% 100%     Weight:  57.2 kg (126 lb 1.7 oz)      11/16/20 1634 11/16/20 1638 11/16/20 1704 11/16/20 1839   BP: (!) 144/65 136/62 (!) 157/68 (!) 145/69   Pulse: 75 77 71 76   Resp:   20 20   Temp:       TempSrc:       SpO2:   100% 100%   Weight:           Abnormal Lab Results:  Labs Reviewed   CBC W/ AUTO DIFFERENTIAL - Abnormal; Notable for the following components:       Result Value    RBC 2.64 (*)     Hemoglobin 8.1 (*)     Hematocrit 26.3 (*)      (*)     MCHC 30.8 (*)     RDW 14.6 (*)     Lymph # 0.5 (*)     Gran % 87.4 (*)     Lymph % 7.0 (*)     All other components within normal limits   COMPREHENSIVE METABOLIC PANEL - Abnormal; Notable for the following components:    CO2 30 (*)     Glucose 305 (*)     BUN 41 (*)     Creatinine 4.2 (*)     eGFR if  11 (*)     eGFR if non  10 (*)     All other components within normal limits   HEPATITIS C ANTIBODY        All Lab Results:  Results for orders placed or performed during the hospital encounter of 11/16/20   Hepatitis C antibody   Result Value Ref Range    Hepatitis C Ab Negative Negative   CBC auto differential   Result Value Ref Range    WBC 6.98 3.90 - 12.70 K/uL    RBC 2.64 (L) 4.00 - 5.40 M/uL    Hemoglobin 8.1 (L) 12.0 - 16.0 g/dL    Hematocrit 26.3 (L) 37.0 - 48.5 %     (H) 82 - 98 fL    MCH 30.7 27.0 - 31.0 pg    MCHC 30.8 (L) 32.0 - 36.0 g/dL    RDW 14.6 (H) 11.5 - 14.5 %    Platelets 239 150 - 350 K/uL    MPV 9.5 9.2 - 12.9 fL    Immature Granulocytes 0.3 0.0 - 0.5 %    Gran # (ANC) 6.1 1.8 - 7.7 K/uL    Immature Grans (Abs) 0.02 0.00 - 0.04 K/uL    Lymph # 0.5 (L) 1.0 - 4.8 K/uL    Mono # 0.3 0.3 - 1.0 K/uL    Eos # 0.0 0.0 - 0.5 K/uL    Baso # 0.01 0.00 - 0.20 K/uL    nRBC 0 0 /100 WBC    Gran % 87.4 (H) 38.0 - 73.0 %    Lymph % 7.0 (L) 18.0 - 48.0 %    Mono % 4.9 4.0 - 15.0 %    Eosinophil % 0.3 0.0 - 8.0 %    Basophil %  0.1 0.0 - 1.9 %    Differential Method Automated    Comprehensive metabolic panel   Result Value Ref Range    Sodium 136 136 - 145 mmol/L    Potassium 4.7 3.5 - 5.1 mmol/L    Chloride 98 95 - 110 mmol/L    CO2 30 (H) 23 - 29 mmol/L    Glucose 305 (H) 70 - 110 mg/dL    BUN 41 (H) 8 - 23 mg/dL    Creatinine 4.2 (H) 0.5 - 1.4 mg/dL    Calcium 8.8 8.7 - 10.5 mg/dL    Total Protein 6.3 6.0 - 8.4 g/dL    Albumin 3.5 3.5 - 5.2 g/dL    Total Bilirubin 0.3 0.1 - 1.0 mg/dL    Alkaline Phosphatase 69 55 - 135 U/L    AST 14 10 - 40 U/L    ALT 10 10 - 44 U/L    Anion Gap 8 8 - 16 mmol/L    eGFR if African American 11 (A) >60 mL/min/1.73 m^2    eGFR if non African American 10 (A) >60 mL/min/1.73 m^2     *Note: Due to a large number of results and/or encounters for the requested time period, some results have not been displayed. A complete set of results can be found in Results Review.     Imaging Results:  Imaging Results    None        The EKG was ordered, reviewed, and independently interpreted by the ED provider.  Interpretation time: 15:58  Rate: 69 BPM  Rhythm: normal sinus rhythm  Interpretation: Possible left atrial enlargement. Septal infarct, age undetermined. No STEMI.           The Emergency Provider reviewed the vital signs and test results, which are outlined above.    ED Discussion     6:22 PM: Reassessed pt at this time. Discussed with pt all pertinent ED information and results. Discussed pt dx and plan of tx. Gave pt all f/u and return to the ED instructions. All questions and concerns were addressed at this time. Pt expresses understanding of information and instructions, and is comfortable with plan to discharge. Pt is stable for discharge.    I discussed with patient and/or family/caretaker that evaluation in the ED does not suggest any emergent or life threatening medical conditions requiring immediate intervention beyond what was provided in the ED, and I believe patient is safe for discharge.  Regardless,  an unremarkable evaluation in the ED does not preclude the development or presence of a serious of life threatening condition. As such, patient was instructed to return immediately for any worsening or change in current symptoms.         ED Medication(s):  Medications - No data to display    Follow-up Information     Rose Parks MD. Schedule an appointment as soon as possible for a visit in 2 days.    Specialty: Internal Medicine  Why: Return to the Emergency Room, If symptoms worsen  Contact information:  [address] [place] [address]  [phone]                  Discharge Medication List as of 11/16/2020  6:25 PM            Medical Decision Making    Medical Decision Making:   Clinical Tests:   Lab Tests: Ordered and Reviewed  Medical Tests: Ordered and Reviewed           Scribe Attestation:   Scribe #1: I performed the above scribed service and the documentation accurately describes the services I performed. I attest to the accuracy of the note.    Attending:   Physician Attestation Statement for Scribe #1: I, Lizzette Holder MD, personally performed the services described in this documentation, as scribed by Jeromy Mendiola, in my presence, and it is both accurate and complete.          Clinical Impression       ICD-10-CM ICD-9-CM   1. Orthostatic hypotension  I95.1 458.0   2. Weakness  R53.1 780.79   3. ESRD on dialysis  N18.6 585.6    Z99.2 V45.11       Disposition:   Disposition: Discharged  Condition: Stable         Lizzette Holder MD  11/17/20 [id number]

## 2020-11-17 ENCOUNTER — OFFICE VISIT (OUTPATIENT)
Dept: CARDIOLOGY | Facility: CLINIC | Age: 75
End: 2020-11-17
Payer: MEDICARE

## 2020-11-17 ENCOUNTER — OFFICE VISIT (OUTPATIENT)
Dept: HEMATOLOGY/ONCOLOGY | Facility: CLINIC | Age: 75
End: 2020-11-17
Payer: MEDICARE

## 2020-11-17 VITALS
RESPIRATION RATE: 16 BRPM | SYSTOLIC BLOOD PRESSURE: 112 MMHG | HEART RATE: 67 BPM | WEIGHT: 134.06 LBS | BODY MASS INDEX: 22.34 KG/M2 | DIASTOLIC BLOOD PRESSURE: 54 MMHG | OXYGEN SATURATION: 99 % | HEIGHT: 65 IN

## 2020-11-17 VITALS
WEIGHT: 133.81 LBS | DIASTOLIC BLOOD PRESSURE: 74 MMHG | HEART RATE: 70 BPM | HEIGHT: 65 IN | BODY MASS INDEX: 22.3 KG/M2 | OXYGEN SATURATION: 99 % | TEMPERATURE: 97 F | SYSTOLIC BLOOD PRESSURE: 132 MMHG

## 2020-11-17 DIAGNOSIS — E11.69 HYPERLIPIDEMIA ASSOCIATED WITH TYPE 2 DIABETES MELLITUS: ICD-10-CM

## 2020-11-17 DIAGNOSIS — E78.5 HYPERLIPIDEMIA ASSOCIATED WITH TYPE 2 DIABETES MELLITUS: ICD-10-CM

## 2020-11-17 DIAGNOSIS — N18.6 ESRD (END STAGE RENAL DISEASE): ICD-10-CM

## 2020-11-17 DIAGNOSIS — D53.9 MACROCYTIC ANEMIA: Primary | ICD-10-CM

## 2020-11-17 DIAGNOSIS — I34.0 NON-RHEUMATIC MITRAL REGURGITATION: ICD-10-CM

## 2020-11-17 DIAGNOSIS — I15.2 HYPERTENSION ASSOCIATED WITH DIABETES: Chronic | ICD-10-CM

## 2020-11-17 DIAGNOSIS — I27.20 PULMONARY HYPERTENSION: ICD-10-CM

## 2020-11-17 DIAGNOSIS — N03.9 ANEMIA DUE TO PRE-ESRD TREATED WITH ERYTHROPOIETIN: ICD-10-CM

## 2020-11-17 DIAGNOSIS — D63.1 ANEMIA DUE TO PRE-ESRD TREATED WITH ERYTHROPOIETIN: ICD-10-CM

## 2020-11-17 DIAGNOSIS — E11.59 HYPERTENSION ASSOCIATED WITH DIABETES: Chronic | ICD-10-CM

## 2020-11-17 DIAGNOSIS — D64.9 SYMPTOMATIC ANEMIA: ICD-10-CM

## 2020-11-17 DIAGNOSIS — I65.23 BILATERAL CAROTID ARTERY STENOSIS: ICD-10-CM

## 2020-11-17 DIAGNOSIS — I25.118 CORONARY ARTERY DISEASE OF NATIVE ARTERY OF NATIVE HEART WITH STABLE ANGINA PECTORIS: Primary | ICD-10-CM

## 2020-11-17 PROCEDURE — 1125F AMNT PAIN NOTED PAIN PRSNT: CPT | Mod: HCNC,S$GLB,, | Performed by: NURSE PRACTITIONER

## 2020-11-17 PROCEDURE — 1159F MED LIST DOCD IN RCRD: CPT | Mod: HCNC,S$GLB,, | Performed by: NURSE PRACTITIONER

## 2020-11-17 PROCEDURE — 99999 PR PBB SHADOW E&M-EST. PATIENT-LVL V: ICD-10-PCS | Mod: PBBFAC,HCNC,, | Performed by: NURSE PRACTITIONER

## 2020-11-17 PROCEDURE — 1159F PR MEDICATION LIST DOCUMENTED IN MEDICAL RECORD: ICD-10-PCS | Mod: HCNC,S$GLB,, | Performed by: INTERNAL MEDICINE

## 2020-11-17 PROCEDURE — 99999 PR PBB SHADOW E&M-EST. PATIENT-LVL IV: CPT | Mod: PBBFAC,HCNC,, | Performed by: INTERNAL MEDICINE

## 2020-11-17 PROCEDURE — 3008F PR BODY MASS INDEX (BMI) DOCUMENTED: ICD-10-PCS | Mod: HCNC,CPTII,S$GLB, | Performed by: NURSE PRACTITIONER

## 2020-11-17 PROCEDURE — 99999 PR PBB SHADOW E&M-EST. PATIENT-LVL V: CPT | Mod: PBBFAC,HCNC,, | Performed by: NURSE PRACTITIONER

## 2020-11-17 PROCEDURE — 99215 PR OFFICE/OUTPT VISIT, EST, LEVL V, 40-54 MIN: ICD-10-PCS | Mod: HCNC,S$GLB,, | Performed by: NURSE PRACTITIONER

## 2020-11-17 PROCEDURE — 99214 OFFICE O/P EST MOD 30 MIN: CPT | Mod: HCNC,S$GLB,, | Performed by: INTERNAL MEDICINE

## 2020-11-17 PROCEDURE — 3046F HEMOGLOBIN A1C LEVEL >9.0%: CPT | Mod: HCNC,CPTII,S$GLB, | Performed by: INTERNAL MEDICINE

## 2020-11-17 PROCEDURE — 99214 PR OFFICE/OUTPT VISIT, EST, LEVL IV, 30-39 MIN: ICD-10-PCS | Mod: HCNC,S$GLB,, | Performed by: INTERNAL MEDICINE

## 2020-11-17 PROCEDURE — 3008F BODY MASS INDEX DOCD: CPT | Mod: HCNC,CPTII,S$GLB, | Performed by: NURSE PRACTITIONER

## 2020-11-17 PROCEDURE — 1159F MED LIST DOCD IN RCRD: CPT | Mod: HCNC,S$GLB,, | Performed by: INTERNAL MEDICINE

## 2020-11-17 PROCEDURE — 1125F PR PAIN SEVERITY QUANTIFIED, PAIN PRESENT: ICD-10-PCS | Mod: HCNC,S$GLB,, | Performed by: NURSE PRACTITIONER

## 2020-11-17 PROCEDURE — 3046F PR MOST RECENT HEMOGLOBIN A1C LEVEL > 9.0%: ICD-10-PCS | Mod: HCNC,CPTII,S$GLB, | Performed by: INTERNAL MEDICINE

## 2020-11-17 PROCEDURE — 99215 OFFICE O/P EST HI 40 MIN: CPT | Mod: HCNC,S$GLB,, | Performed by: NURSE PRACTITIONER

## 2020-11-17 PROCEDURE — 3008F BODY MASS INDEX DOCD: CPT | Mod: HCNC,CPTII,S$GLB, | Performed by: INTERNAL MEDICINE

## 2020-11-17 PROCEDURE — 99999 PR PBB SHADOW E&M-EST. PATIENT-LVL IV: ICD-10-PCS | Mod: PBBFAC,HCNC,, | Performed by: INTERNAL MEDICINE

## 2020-11-17 PROCEDURE — 1159F PR MEDICATION LIST DOCUMENTED IN MEDICAL RECORD: ICD-10-PCS | Mod: HCNC,S$GLB,, | Performed by: NURSE PRACTITIONER

## 2020-11-17 PROCEDURE — 3008F PR BODY MASS INDEX (BMI) DOCUMENTED: ICD-10-PCS | Mod: HCNC,CPTII,S$GLB, | Performed by: INTERNAL MEDICINE

## 2020-11-17 RX ORDER — ACETAMINOPHEN 325 MG/1
650 TABLET ORAL
Status: CANCELLED | OUTPATIENT
Start: 2020-11-17

## 2020-11-17 RX ORDER — METOPROLOL TARTRATE 25 MG/1
25 TABLET, FILM COATED ORAL 2 TIMES DAILY
Qty: 180 TABLET | Refills: 0 | Status: SHIPPED | OUTPATIENT
Start: 2020-11-17 | End: 2021-03-26 | Stop reason: SDUPTHER

## 2020-11-17 RX ORDER — HYDROCODONE BITARTRATE AND ACETAMINOPHEN 500; 5 MG/1; MG/1
TABLET ORAL ONCE
Status: CANCELLED | OUTPATIENT
Start: 2020-11-17 | End: 2020-11-17

## 2020-11-17 RX ORDER — DIPHENHYDRAMINE HCL 25 MG
25 CAPSULE ORAL
Status: CANCELLED | OUTPATIENT
Start: 2020-11-17

## 2020-11-17 NOTE — PROGRESS NOTES
Subjective:   Patient ID:  Avril Monzon is a 75 y.o. female who presents for cardiac consult of Hypotension and Follow-up      Palpitations   Associated symptoms include chest pain, malaise/fatigue and shortness of breath.   Chest Pain   Associated symptoms include malaise/fatigue, palpitations and shortness of breath.     The patient came in today for cardiac consult of Hypotension and Follow-up    Avril Monzon is a 75 y.o. female pt with HTN, hyperlipidemia, DM type II, former tobacco abuse, CKD with AV shunt, anemia, history of CVA,  Carotid artery disease, CAD s/p previous NSTEMI with PCI of LAD in 12/18 here for CV follow up.     Avril Monzon is a 75 y.o. female  admitted to ICU on NIV with a dx of acute on chronic systolic and diastolic CHF exacerbation , Volume overload , Uncontrolled HTN , Acute hypoxic respiratory failure  And CKD stage 5 . She was started on NIV , NTG drip and lasix drip with and improvement of her sx . She is (-) 2.3 lt since admission . She is wean off  BIPAP .   She cont on NTG drip and lasix drip . The BP has improved  . Her home BP medication were resume .   She has improved with IV diuresis, cardiology consulted for CHF. Discussed will need volume removal and will work up pulm HTN as outpt.    7/4/20 - changed to lasix 80 with BIdil, pt feels well ready for DC today. NO acute events overnight. Will follow up in CV clinic    7/20/20 - Hosp follow up  ECHO in hosp with elevated PA pressures, with mild to moderate TR/MR. She feels well, did not need oxygen at night. Swelling is stable/improved. No CP. Is active at home.     8/28/20  She has been feeling better but has been having more palpitations x 1 week. Stable dyspnea no chest pain. She feels more tired at times. BP stable at home, is on Torsemide now.    10/13/20  Holter with occ PVCs, freq PACs, AVG HR 72. No further palpitations.  BP elevated today 176/60. She also has pain in right shoulder blade, going to right breast.  She slept with arm above head once.     11/17/20  BP low today, she went to ER yesterday and received IVF. She felt fatigue, not lightheaded and was dyspneic. She started HD about a month ago. Will titrate and adjust meds.   She will receive blood today.     Patient feels  no chest pain, no PND, no dizziness, no syncope, no CNS symptoms.    Patient has fairly good exercise tolerance.    Patient is compliant with medications.    HOLTER 8/13/20  Predominant Rhythm  Sinus rhythm with heart rates varying between 57 and 96 bpm with an average of 72 bpm.   PVC    Ventricular Arrhythmias  There were occasional PVCs totalling 1078 and averaging 11.23 per hour.   PAC    Supraventricular Arrhythmias  There were frequent PACs totalling 6784 and averaging 70.67 per hour.         Results for orders placed during the hospital encounter of 07/01/20   Echo Color Flow Doppler? Yes    Narrative · Concentric left ventricular hypertrophy.  · Normal left ventricular systolic function. The estimated ejection   fraction is 60%.  · Grade II (moderate) left ventricular diastolic dysfunction consistent   with pseudonormalization.  · Normal right ventricular systolic function.  · Mild mitral regurgitation.  · Mild to moderate tricuspid regurgitation.  · Mild pulmonic regurgitation.  · Moderate mitral prolapse of the anterior mitral leaflet.  · Normal central venous pressure (3 mmHg).  · The estimated PA systolic pressure is 68 mmHg.  · Pulmonary hypertension present.        1/2019 [facility]  CONCLUSIONS   There is 40 - 49% right Internal Carotid stenosis.   There is 40 - 49% left Internal Carotid stenosis.       Past Medical History:   Diagnosis Date    Acute hypoxemic respiratory failure 11/26/2018    Anemia     Arthritis     back, hand, knees    Back pain     Cataract     CKD stage G4/A3, GFR 15 - 29 and albumin creatinine ratio >300 mg/g     GFR 40% Jan 2014 and 33% ub 3/2014 ([facility])    Coronary artery disease involving native coronary  artery of native heart 2018    Diabetic retinopathy     DM (diabetes mellitus) type II controlled, neurological manifestation     Exudative age-related macular degeneration of left eye with active choroidal neovascularization 2019    GERD (gastroesophageal reflux disease)     Glaucoma     Heart failure     Hyperlipidemia     Hypertension     Non-ST elevation MI (NSTEMI) 2018    NSTEMI (non-ST elevated myocardial infarction) 2018    Peripheral neuropathy     Polyneuropathy     Proteinuria     >6 mo     Seizures     [facility] 2014 -dick CT NRI showed small vessel    Stroke ,    Tobacco dependence     Type 2 diabetes with peripheral circulatory disorder, controlled        Past Surgical History:   Procedure Laterality Date    BREAST LUMPECTOMY Left     benign, when patient was 15 years old    BREAST MASS EXCISION      ,benign, age 15.    CATHETERIZATION OF BOTH LEFT AND RIGHT HEART N/A 2018    Procedure: CATHETERIZATION, HEART, BOTH LEFT AND RIGHT;  Surgeon: Michelle Holman MD;  Location: [facility] CATH LAB;  Service: Cardiology;  Laterality: N/A;    CATHETERIZATION OF BOTH LEFT AND RIGHT HEART N/A 2018    Procedure: CATHETERIZATION, HEART, BOTH LEFT AND RIGHT;  Surgeon: Michelle Holman MD;  Location: [facility] CATH LAB;  Service: Cardiology;  Laterality: N/A;    HYSTERECTOMY  1982    INSERTION OF SHUNT      LEFT HEART CATHETERIZATION Left 12/3/2018    Procedure: CATHETERIZATION, HEART, LEFT;  Surgeon: Michelle Holman MD;  Location: [facility] CATH LAB;  Service: Cardiology;  Laterality: Left;  LAD ATHERECTOMY STENT/ FEMORAL APPROACH    OOPHORECTOMY      wrist cyst Right 1980s    dorsal wrist cyst       Social History     Tobacco Use    Smoking status: Former Smoker     Packs/day: 1.50     Years: 30.00     Pack years: 45.00     Types: Cigarettes     Quit date: 1990     Years since quittin.8    Smokeless tobacco: Former User   Substance Use Topics    Alcohol use:  No     Alcohol/week: 0.0 standard drinks    Drug use: No       Family History   Problem Relation Age of Onset    Diabetes Mother     Hyperlipidemia Mother     Hypertension Mother     Heart disease Mother         MI    Muscular dystrophy Son     Cancer Maternal Grandmother         colon       Patient's Medications   New Prescriptions    No medications on file   Previous Medications    ACCU-CHEK ARDEN PLUS METER MISC    USE TO TEST TWICE DAILY    ASPIRIN (ECOTRIN) 81 MG EC TABLET    Take 1 tablet (81 mg total) by mouth once daily.    ATORVASTATIN (LIPITOR) 80 MG TABLET    Take 1 tablet (80 mg total) by mouth once daily.    ATROPINE 1% (ISOPTO ATROPINE) 1 % DROP    Place 1 drop into the left eye 3 (three) times daily.    BIDIL 20-37.5 MG TAB    TAKE 2 TABLETS BY MOUTH THREE TIMES DAILY    BLOOD SUGAR DIAGNOSTIC STRP    1 strip by Misc.(Non-Drug; Combo Route) route 3 (three) times daily. relion ultima    CALCITRIOL (ROCALTROL) 0.25 MCG CAP    Take 1 capsule by mouth once daily    CLOPIDOGREL (PLAVIX) 75 MG TABLET    TAKE 1 TABLET BY MOUTH ONCE DAILY    FLUTICASONE PROPIONATE (FLONASE) 50 MCG/ACTUATION NASAL SPRAY    1 spray (50 mcg total) by Each Nostril route once daily.    INSULIN NPH-INSULIN REGULAR, 70/30, (NOVOLIN 70/30 U-100 INSULIN) 100 UNIT/ML (70-30) INJECTION    INJECT SUBCUTANEOUSLY 30 UNITS IN THE MORNING AND INJECT 10 UNITS IN THE EVENING    LANCETS (ACCU-CHEK SOFTCLIX LANCETS) MISC    1 lancet by Misc.(Non-Drug; Combo Route) route 3 (three) times daily.    LORATADINE (CLARITIN) 10 MG TABLET    Take 1 tablet (10 mg total) by mouth once daily.    NITROGLYCERIN (NITROSTAT) 0.4 MG SL TABLET    Place 1 tablet (0.4 mg total) under the tongue every 5 (five) minutes as needed for Chest pain.    ONDANSETRON (ZOFRAN-ODT) 4 MG TBDL    DISSOLVE 1 TABLET IN MOUTH THREE TIMES DAILY AS NEEDED FOR NAUSEA FOR 5 DAYS    SEVELAMER CARBONATE (RENVELA) 800 MG TAB    Take 1 tablet (800 mg total) by mouth 3 (three) times  "daily with meals.    SODIUM BICARBONATE 325 MG TABLET    Take 1 tablet (325 mg total) by mouth 3 (three) times daily.    TORSEMIDE (DEMADEX) 20 MG TAB    Take 2 tablets by mouth once daily   Modified Medications    Modified Medication Previous Medication    METOPROLOL TARTRATE (LOPRESSOR) 25 MG TABLET metoprolol tartrate (LOPRESSOR) 25 MG tablet       Take 1 tablet (25 mg total) by mouth 2 (two) times daily.    Take 1 tablet by mouth twice daily   Discontinued Medications    AMLODIPINE (NORVASC) 10 MG TABLET    Take 0.5 tablets (5 mg total) by mouth once daily.    FUROSEMIDE (LASIX) 80 MG TABLET    Take 1 tablet (80 mg total) by mouth 2 (two) times daily.    METOPROLOL TARTRATE (LOPRESSOR) 50 MG TABLET    Take 1 tablet (50 mg total) by mouth 2 (two) times daily.       Review of Systems   Constitutional: Positive for malaise/fatigue.   HENT: Negative.    Eyes: Negative.    Respiratory: Positive for shortness of breath.    Cardiovascular: Positive for chest pain, palpitations and leg swelling.   Gastrointestinal: Negative.    Genitourinary: Negative.    Musculoskeletal: Negative.    Skin: Negative.    Neurological: Negative.    Endo/Heme/Allergies: Negative.    Psychiatric/Behavioral: Negative.    All 12 systems otherwise negative.      Wt Readings from Last 3 Encounters:   11/17/20 60.8 kg (134 lb 0.6 oz)   11/17/20 60.7 kg (133 lb 13.1 oz)   11/16/20 57.2 kg (126 lb 1.7 oz)     Temp Readings from Last 3 Encounters:   11/17/20 97.3 °F (36.3 °C) (Oral)   11/16/20 98.2 °F (36.8 °C) (Oral)   10/29/20 98.6 °F (37 °C) (Temporal)     BP Readings from Last 3 Encounters:   11/17/20 (!) 112/54   11/17/20 132/74   11/16/20 (!) 145/69     Pulse Readings from Last 3 Encounters:   11/17/20 67   11/17/20 70   11/16/20 76       BP (!) 112/54 (BP Location: Right arm, Patient Position: Sitting, BP Method: Small (Manual))   Pulse 67   Resp 16   Ht 5' 5" (1.651 m)   Wt 60.8 kg (134 lb 0.6 oz)   LMP  (LMP Unknown)   SpO2 99%   " BMI 22.31 kg/m²     Objective:   Physical Exam   Constitutional: She is oriented to person, place, and time. She appears well-developed and well-nourished. No distress.   HENT:   Head: Normocephalic and atraumatic.   Nose: Nose normal.   Mouth/Throat: Oropharynx is clear and moist.   Eyes: Conjunctivae and EOM are normal. No scleral icterus.   Neck: Normal range of motion. Neck supple. No JVD present. No thyromegaly present.   Cardiovascular: Normal rate, regular rhythm, S1 normal and S2 normal. Exam reveals no gallop, no S3, no S4 and no friction rub.   Murmur heard.  Pulmonary/Chest: Effort normal and breath sounds normal. No stridor. No respiratory distress. She has no wheezes. She has no rales. She exhibits no tenderness.   Abdominal: Soft. Bowel sounds are normal. She exhibits no distension and no mass. There is no abdominal tenderness. There is no rebound.   Genitourinary:    Genitourinary Comments: Deferred     Musculoskeletal: Normal range of motion.         General: Edema present. No tenderness or deformity.   Lymphadenopathy:     She has no cervical adenopathy.   Neurological: She is alert and oriented to person, place, and time. She exhibits normal muscle tone. Coordination normal.   Skin: Skin is warm and dry. No rash noted. She is not diaphoretic. No erythema. No pallor.   Psychiatric: She has a normal mood and affect. Her behavior is normal. Judgment and thought content normal.   Nursing note and vitals reviewed.      Lab Results   Component Value Date     11/17/2020    K 5.0 11/17/2020     11/17/2020    CO2 27 11/17/2020    BUN 52 (H) 11/17/2020    CREATININE 5.1 (H) 11/17/2020     (H) 11/17/2020    HGBA1C 9.3 (H) 10/21/2020    MG 1.9 10/22/2020    AST 14 11/16/2020    ALT 10 11/16/2020    ALBUMIN 3.5 11/16/2020    PROT 6.3 11/16/2020    BILITOT 0.3 11/16/2020    WBC 5.41 11/17/2020    HGB 7.8 (L) 11/17/2020    HCT 25.5 (L) 11/17/2020     (H) 11/17/2020     11/17/2020     INR 0.9 06/15/2020    TSH 1.740 03/27/2019    CHOL 150 03/27/2019    HDL 45 03/27/2019    LDLCALC 90.0 03/27/2019    TRIG 75 03/27/2019     (H) 07/03/2020     Assessment:      1. Coronary artery disease of native artery of native heart with stable angina pectoris    2. Hyperlipidemia associated with type 2 diabetes mellitus    3. Bilateral carotid artery stenosis    4. Pulmonary hypertension    5. Hypertension associated with diabetes    6. Non-rheumatic mitral regurgitation    7. ESRD (end stage renal disease)        Plan:   1. HFpEF with mild to mod MR/TR  - cont torsemide  - monitor BP and weights  - low salt diet  - rec compression stockings    2. CAD s/p PCI  - cont asa, plavix, statin, BB  - stable    3. HTN - low now since HD  - cont meds and titrate     4. Carotid artery stenosis , mild to mod  - cont to monitor; order repeat u/s  - cont asa, statin    5. CKD4-5 now ESRD on HD   - cont HD    6. H/o CVA  - cont asa, statin    7. Palpitations  - Holter -  occ PVCs, freq PACs, AVG HR 72   - cont BB  - dec caffine     8. Anemia  - will receive blood  - procrit with HD      Thank you for allowing me to participate in this patient's care. Please do not hesitate to contact me with any questions or concerns. Consult note has been forwarded to the referral physician.

## 2020-11-17 NOTE — PROGRESS NOTES
Subjective:      Patient ID: Avril Monzon is a 75 y.o. female.    Chief Complaint: fatigue after dialysis    HPI:  Patient is a 75 year old female with anemia due to ESRD recently started dialysis on M, W, F at [facility].  Presents today for evaluation of labs/possible blood transfusion.  Recently received Procrit 40,000 units every 2 weeks within the infusion suite for hgb <10.  States recently hospitalized due to hypotension.  States that she was told to hold her BP medication till after dialysis and she tool her BP medication after and BP dropped to 88/43.  She states fluids were given in the ambulance and at the hospital and she was discharged after pressure increased.  BP normal today.      She complains of shortness of breath on exertion and increased fatigue after dialysis.  She denies chest pain or shortness of breath at rest.  Hgb today is 7.8 g/dL with .       Social History     Socioeconomic History    Marital status:      Spouse name: Not on file    Number of children: Not on file    Years of education: Not on file    Highest education level: Not on file   Occupational History    Not on file   Social Needs    Financial resource strain: Not on file    Food insecurity     Worry: Not on file     Inability: Not on file    Transportation needs     Medical: Not on file     Non-medical: Not on file   Tobacco Use    Smoking status: Former Smoker     Packs/day: 1.50     Years: 30.00     Pack years: 45.00     Types: Cigarettes     Quit date: 1990     Years since quittin.8    Smokeless tobacco: Former User   Substance and Sexual Activity    Alcohol use: No     Alcohol/week: 0.0 standard drinks    Drug use: No    Sexual activity: Never   Lifestyle    Physical activity     Days per week: Not on file     Minutes per session: Not on file    Stress: Not at all   Relationships    Social connections     Talks on phone: Not on file     Gets together: Not on file      Attends Faith service: Not on file     Active member of club or organization: Not on file     Attends meetings of clubs or organizations: Not on file     Relationship status: Not on file   Other Topics Concern    Not on file   Social History Narrative    Not on file       Family History   Problem Relation Age of Onset    Diabetes Mother     Hyperlipidemia Mother     Hypertension Mother     Heart disease Mother         MI    Muscular dystrophy Son     Cancer Maternal Grandmother         colon       Past Surgical History:   Procedure Laterality Date    BREAST LUMPECTOMY Left     benign, when patient was 15 years old    BREAST MASS EXCISION      ,benign, age 15.    CATHETERIZATION OF BOTH LEFT AND RIGHT HEART N/A 11/28/2018    Procedure: CATHETERIZATION, HEART, BOTH LEFT AND RIGHT;  Surgeon: Michelle Holman MD;  Location: [facility] CATH LAB;  Service: Cardiology;  Laterality: N/A;    CATHETERIZATION OF BOTH LEFT AND RIGHT HEART N/A 11/30/2018    Procedure: CATHETERIZATION, HEART, BOTH LEFT AND RIGHT;  Surgeon: Michelle Holman MD;  Location: [facility] CATH LAB;  Service: Cardiology;  Laterality: N/A;    HYSTERECTOMY  1982    INSERTION OF SHUNT      LEFT HEART CATHETERIZATION Left 12/3/2018    Procedure: CATHETERIZATION, HEART, LEFT;  Surgeon: Michelle Holman MD;  Location: [facility] CATH LAB;  Service: Cardiology;  Laterality: Left;  LAD ATHERECTOMY STENT/ FEMORAL APPROACH    OOPHORECTOMY      wrist cyst Right 1980s    dorsal wrist cyst       Past Medical History:   Diagnosis Date    Acute hypoxemic respiratory failure 11/26/2018    Anemia     Arthritis     back, hand, knees    Back pain     Cataract     CKD stage G4/A3, GFR 15 - 29 and albumin creatinine ratio >300 mg/g     GFR 40% Jan 2014 and 33% ub 3/2014 ([facility])    Coronary artery disease involving native coronary artery of native heart 11/30/2018    Diabetic retinopathy     DM (diabetes mellitus) type II controlled, neurological manifestation      Exudative age-related macular degeneration of left eye with active choroidal neovascularization 2/5/2019    GERD (gastroesophageal reflux disease)     Glaucoma     Heart failure     Hyperlipidemia     Hypertension     Non-ST elevation MI (NSTEMI) 11/28/2018    NSTEMI (non-ST elevated myocardial infarction) 11/27/2018    Peripheral neuropathy     Polyneuropathy     Proteinuria     >6 mo     Seizures     [facility] 1/2014 -dick CT NRI showed small vessel    Stroke 2012,2014    Tobacco dependence     Type 2 diabetes with peripheral circulatory disorder, controlled        Review of Systems   Constitutional: Positive for fatigue (after dialysis on m, w, f).   HENT: Negative.    Eyes: Negative.    Respiratory: Positive for shortness of breath (with exertion).    Cardiovascular: Negative.    Gastrointestinal: Negative.    Endocrine: Negative.    Genitourinary: Negative.    Musculoskeletal: Negative.    Skin: Negative.    Allergic/Immunologic: Negative.    Neurological: Negative.    Hematological: Negative.    Psychiatric/Behavioral: Negative.           Medication List with Changes/Refills   Current Medications    ACCU-CHEK ARDEN PLUS METER MISC    USE TO TEST TWICE DAILY    AMLODIPINE (NORVASC) 10 MG TABLET    Take 0.5 tablets (5 mg total) by mouth once daily.    ASPIRIN (ECOTRIN) 81 MG EC TABLET    Take 1 tablet (81 mg total) by mouth once daily.    ATORVASTATIN (LIPITOR) 80 MG TABLET    Take 1 tablet (80 mg total) by mouth once daily.    ATROPINE 1% (ISOPTO ATROPINE) 1 % DROP    Place 1 drop into the left eye 3 (three) times daily.    BIDIL 20-37.5 MG TAB    TAKE 2 TABLETS BY MOUTH THREE TIMES DAILY    BLOOD SUGAR DIAGNOSTIC STRP    1 strip by Misc.(Non-Drug; Combo Route) route 3 (three) times daily. relion ultima    CALCITRIOL (ROCALTROL) 0.25 MCG CAP    Take 1 capsule by mouth once daily    CLOPIDOGREL (PLAVIX) 75 MG TABLET    TAKE 1 TABLET BY MOUTH ONCE DAILY    FLUTICASONE PROPIONATE (FLONASE) 50 MCG/ACTUATION  NASAL SPRAY    1 spray (50 mcg total) by Each Nostril route once daily.    FUROSEMIDE (LASIX) 80 MG TABLET    Take 1 tablet (80 mg total) by mouth 2 (two) times daily.    INSULIN NPH-INSULIN REGULAR, 70/30, (NOVOLIN 70/30 U-100 INSULIN) 100 UNIT/ML (70-30) INJECTION    INJECT SUBCUTANEOUSLY 30 UNITS IN THE MORNING AND INJECT 10 UNITS IN THE EVENING    LANCETS (ACCU-CHEK SOFTCLIX LANCETS) MISC    1 lancet by Misc.(Non-Drug; Combo Route) route 3 (three) times daily.    LORATADINE (CLARITIN) 10 MG TABLET    Take 1 tablet (10 mg total) by mouth once daily.    METOPROLOL TARTRATE (LOPRESSOR) 25 MG TABLET    Take 1 tablet by mouth twice daily    METOPROLOL TARTRATE (LOPRESSOR) 50 MG TABLET    Take 1 tablet (50 mg total) by mouth 2 (two) times daily.    NITROGLYCERIN (NITROSTAT) 0.4 MG SL TABLET    Place 1 tablet (0.4 mg total) under the tongue every 5 (five) minutes as needed for Chest pain.    ONDANSETRON (ZOFRAN-ODT) 4 MG TBDL    DISSOLVE 1 TABLET IN MOUTH THREE TIMES DAILY AS NEEDED FOR NAUSEA FOR 5 DAYS    SEVELAMER CARBONATE (RENVELA) 800 MG TAB    Take 1 tablet (800 mg total) by mouth 3 (three) times daily with meals.    SODIUM BICARBONATE 325 MG TABLET    Take 1 tablet (325 mg total) by mouth 3 (three) times daily.    TORSEMIDE (DEMADEX) 20 MG TAB    Take 2 tablets by mouth once daily        Objective:     Vitals:    11/17/20 1045   BP: 132/74   Pulse: 70   Temp: 97.3 °F (36.3 °C)       Physical Exam  Vitals signs reviewed.   Constitutional:       Appearance: Normal appearance.   HENT:      Head: Normocephalic and atraumatic.   Neck:      Musculoskeletal: Normal range of motion.   Cardiovascular:      Rate and Rhythm: Normal rate and regular rhythm.   Pulmonary:      Effort: Pulmonary effort is normal.      Breath sounds: Normal breath sounds.   Abdominal:      General: There is no distension.   Musculoskeletal: Normal range of motion.   Skin:     General: Skin is warm.   Neurological:      General: No focal  deficit present.      Mental Status: She is alert.   Psychiatric:         Attention and Perception: Attention and perception normal.         Mood and Affect: Mood normal.         Speech: Speech normal.         Behavior: Behavior normal.         Thought Content: Thought content normal.         Cognition and Memory: Cognition normal.         Judgment: Judgment normal.         Assessment:     Problem List Items Addressed This Visit     None      Visit Diagnoses     Macrocytic anemia    -  Primary    Relevant Orders    Vitamin B12    Folate    Ambulatory referral/consult to Chemotherapy Infusion    Type & Screen - Lab Collect    Prepare RBC 1 Unit    CBC Auto Differential    CBC Auto Differential    Anemia due to pre-ESRD treated with erythropoietin        Relevant Orders    Ambulatory referral/consult to Chemotherapy Infusion    Type & Screen - Lab Collect    Prepare RBC 1 Unit    CBC Auto Differential    Comprehensive Metabolic Panel    CBC Auto Differential    Symptomatic anemia        Relevant Orders    Ambulatory referral/consult to Chemotherapy Infusion    Type & Screen - Lab Collect    Prepare RBC 1 Unit    CBC Auto Differential    CBC Auto Differential        Lab Results   Component Value Date    WBC 5.41 11/17/2020    HGB 7.8 (L) 11/17/2020    HCT 25.5 (L) 11/17/2020     (H) 11/17/2020     11/17/2020       BMP  Lab Results   Component Value Date     11/17/2020    K 5.0 11/17/2020     11/17/2020    CO2 27 11/17/2020    BUN 52 (H) 11/17/2020    CREATININE 5.1 (H) 11/17/2020    CALCIUM 9.2 11/17/2020    ANIONGAP 11 11/17/2020    ESTGFRAFRICA 9 (A) 11/17/2020    EGFRNONAA 8 (A) 11/17/2020     Lab Results   Component Value Date    ALT 10 11/16/2020    AST 14 11/16/2020    ALKPHOS 69 11/16/2020    BILITOT 0.3 11/16/2020     Lab Results   Component Value Date    IRON 107 09/28/2020    TIBC 309 09/28/2020    FERRITIN 285 09/28/2020     Lab Results   Component Value Date    PPQBXNSS05 496  03/04/2019     Lab Results   Component Value Date    FOLATE 7.0 06/21/2016       Plan:   Macrocytic anemia  -     Vitamin B12; Future; Expected date: 11/17/2020  -     Folate; Future; Expected date: 11/17/2020  -     Ambulatory referral/consult to Chemotherapy Infusion; Future; Expected date: 11/24/2020  -     Type & Screen - Lab Collect; Future; Expected date: 11/17/2020  -     Prepare RBC 1 Unit; Future  -     CBC Auto Differential; Future; Expected date: 11/17/2020  -     CBC Auto Differential; Future; Expected date: 11/17/2020    Anemia due to pre-ESRD treated with erythropoietin  -     Ambulatory referral/consult to Chemotherapy Infusion; Future; Expected date: 11/24/2020  -     Type & Screen - Lab Collect; Future; Expected date: 11/17/2020  -     Prepare RBC 1 Unit; Future  -     CBC Auto Differential; Future; Expected date: 11/17/2020  -     Comprehensive Metabolic Panel; Future; Expected date: 11/17/2020  -     CBC Auto Differential; Future; Expected date: 11/17/2020    Symptomatic anemia  -     Ambulatory referral/consult to Chemotherapy Infusion; Future; Expected date: 11/24/2020  -     Type & Screen - Lab Collect; Future; Expected date: 11/17/2020  -     Prepare RBC 1 Unit; Future  -     CBC Auto Differential; Future; Expected date: 11/17/2020  -     CBC Auto Differential; Future; Expected date: 11/17/2020    No longer needs EPO or iron through our clinic due to receiving all therapies through dialysis.  Will schedule for 1 unit blood on Thursday.  She will get type and screened today.  Repeat labs in 1 week - cbc, b12, folate.  F/u in 3 months with cbc, cmp - possible blood transfusion.      Collaborating Provider:  Dr. Riki Bowman    Thank You,  Betty Archibald, ARLETHP-C

## 2020-11-17 NOTE — ED NOTES
Patient sitting up in bed, no acute distress noted, awake, alert, and oriented x 3, calm, respirations even and unlabored, and skin is warm and dry. Patient verbalized understanding of status and plan of care. Patient requesting tylenol for a HA; will notify MD. Side rails up x 2, call light in reach, bed low and locked. Family at bedside. Will continue to monitor.

## 2020-11-17 NOTE — PATIENT INSTRUCTIONS
Type and screen at hospital today    1 unit of red blood cells on Thursday    Repeat labs in one week cbc, folate, and B12 level    Follow up in 3 months with labs - CBC, CMP

## 2020-11-19 ENCOUNTER — INFUSION (OUTPATIENT)
Dept: INFUSION THERAPY | Facility: HOSPITAL | Age: 75
End: 2020-11-19
Attending: INTERNAL MEDICINE
Payer: MEDICARE

## 2020-11-19 VITALS
HEART RATE: 70 BPM | TEMPERATURE: 99 F | RESPIRATION RATE: 18 BRPM | OXYGEN SATURATION: 99 % | SYSTOLIC BLOOD PRESSURE: 137 MMHG | DIASTOLIC BLOOD PRESSURE: 55 MMHG

## 2020-11-19 DIAGNOSIS — D64.9 SYMPTOMATIC ANEMIA: ICD-10-CM

## 2020-11-19 DIAGNOSIS — N03.9 ANEMIA DUE TO PRE-ESRD TREATED WITH ERYTHROPOIETIN: ICD-10-CM

## 2020-11-19 DIAGNOSIS — D63.1 ANEMIA DUE TO PRE-ESRD TREATED WITH ERYTHROPOIETIN: ICD-10-CM

## 2020-11-19 DIAGNOSIS — D53.9 MACROCYTIC ANEMIA: ICD-10-CM

## 2020-11-19 PROCEDURE — 25000003 PHARM REV CODE 250: Mod: HCNC | Performed by: NURSE PRACTITIONER

## 2020-11-19 PROCEDURE — 36430 TRANSFUSION BLD/BLD COMPNT: CPT | Mod: HCNC

## 2020-11-19 RX ORDER — HYDROCODONE BITARTRATE AND ACETAMINOPHEN 500; 5 MG/1; MG/1
TABLET ORAL ONCE
Status: DISCONTINUED | OUTPATIENT
Start: 2020-11-19 | End: 2020-11-19 | Stop reason: HOSPADM

## 2020-11-19 RX ORDER — DIPHENHYDRAMINE HCL 25 MG
25 CAPSULE ORAL
Status: COMPLETED | OUTPATIENT
Start: 2020-11-19 | End: 2020-11-19

## 2020-11-19 RX ORDER — ACETAMINOPHEN 325 MG/1
650 TABLET ORAL
Status: COMPLETED | OUTPATIENT
Start: 2020-11-19 | End: 2020-11-19

## 2020-11-19 RX ADMIN — ACETAMINOPHEN 650 MG: 325 TABLET ORAL at 09:11

## 2020-11-19 RX ADMIN — DIPHENHYDRAMINE HYDROCHLORIDE 25 MG: 25 CAPSULE ORAL at 09:11

## 2020-11-19 NOTE — PLAN OF CARE
Patient tolerated blood transfusion well; no adverse reactions noted. Iv discontinued with unit intact. 2x2 to site. Avs printed and reviewed. No complaints voiced. Ambulatory off unit with belongings.

## 2020-11-26 DIAGNOSIS — D53.9 MACROCYTIC ANEMIA: Primary | ICD-10-CM

## 2020-11-30 ENCOUNTER — PATIENT OUTREACH (OUTPATIENT)
Dept: ADMINISTRATIVE | Facility: HOSPITAL | Age: 75
End: 2020-11-30

## 2020-11-30 NOTE — PROGRESS NOTES
Working Diabetes Report     Pt A1c updated from Care Everywhere 9.0   Modifier Changed to 3 months   Pt Already has Diabetic labs Scheduled for 1/26/2021     Maricel JARRELL LPN Care Coordinator  Care Coordination Department  [facility]  [phone]

## 2021-01-26 ENCOUNTER — LAB VISIT (OUTPATIENT)
Dept: LAB | Facility: HOSPITAL | Age: 76
End: 2021-01-26
Attending: INTERNAL MEDICINE
Payer: MEDICARE

## 2021-01-26 DIAGNOSIS — E11.69 HYPERLIPIDEMIA ASSOCIATED WITH TYPE 2 DIABETES MELLITUS: ICD-10-CM

## 2021-01-26 DIAGNOSIS — E55.9 VITAMIN D DEFICIENCY: ICD-10-CM

## 2021-01-26 DIAGNOSIS — Z29.9 PREVENTIVE MEASURE: ICD-10-CM

## 2021-01-26 DIAGNOSIS — D53.9 MACROCYTIC ANEMIA: ICD-10-CM

## 2021-01-26 DIAGNOSIS — E11.42 DM TYPE 2 WITH DIABETIC PERIPHERAL NEUROPATHY: ICD-10-CM

## 2021-01-26 DIAGNOSIS — E78.5 HYPERLIPIDEMIA ASSOCIATED WITH TYPE 2 DIABETES MELLITUS: ICD-10-CM

## 2021-01-26 PROCEDURE — 36415 COLL VENOUS BLD VENIPUNCTURE: CPT | Mod: PO

## 2021-01-26 PROCEDURE — 80061 LIPID PANEL: CPT

## 2021-01-26 PROCEDURE — 83520 IMMUNOASSAY QUANT NOS NONAB: CPT

## 2021-01-26 PROCEDURE — 83036 HEMOGLOBIN GLYCOSYLATED A1C: CPT

## 2021-01-26 PROCEDURE — 83090 ASSAY OF HOMOCYSTEINE: CPT

## 2021-01-26 PROCEDURE — 83921 ORGANIC ACID SINGLE QUANT: CPT

## 2021-01-26 PROCEDURE — 82306 VITAMIN D 25 HYDROXY: CPT

## 2021-01-26 PROCEDURE — 84443 ASSAY THYROID STIM HORMONE: CPT

## 2021-01-27 DIAGNOSIS — R79.89 ELEVATED HOMOCYSTEINE: Primary | ICD-10-CM

## 2021-01-27 DIAGNOSIS — E53.8 FOLIC ACID DEFICIENCY: ICD-10-CM

## 2021-01-27 LAB
25(OH)D3+25(OH)D2 SERPL-MCNC: 7 NG/ML (ref 30–96)
CHOLEST SERPL-MCNC: 239 MG/DL (ref 120–199)
CHOLEST/HDLC SERPL: 2.9 {RATIO} (ref 2–5)
ESTIMATED AVG GLUCOSE: 154 MG/DL (ref 68–131)
HBA1C MFR BLD HPLC: 7 % (ref 4–5.6)
HCYS SERPL-SCNC: 20.3 UMOL/L (ref 4–15.5)
HDLC SERPL-MCNC: 82 MG/DL (ref 40–75)
HDLC SERPL: 34.3 % (ref 20–50)
KAPPA LC SER QL IA: 11.15 MG/DL (ref 0.33–1.94)
KAPPA LC/LAMBDA SER IA: 2.16 (ref 0.26–1.65)
LAMBDA LC SER QL IA: 5.17 MG/DL (ref 0.57–2.63)
LDLC SERPL CALC-MCNC: 136.2 MG/DL (ref 63–159)
NONHDLC SERPL-MCNC: 157 MG/DL
TRIGL SERPL-MCNC: 104 MG/DL (ref 30–150)
TSH SERPL DL<=0.005 MIU/L-ACNC: 1.79 UIU/ML (ref 0.4–4)

## 2021-01-27 RX ORDER — FOLIC ACID 1 MG/1
1 TABLET ORAL DAILY
Qty: 100 TABLET | Refills: 3 | Status: SHIPPED | OUTPATIENT
Start: 2021-01-27 | End: 2021-10-19

## 2021-01-28 ENCOUNTER — TELEPHONE (OUTPATIENT)
Dept: HEMATOLOGY/ONCOLOGY | Facility: CLINIC | Age: 76
End: 2021-01-28

## 2021-01-29 ENCOUNTER — TELEPHONE (OUTPATIENT)
Dept: HEMATOLOGY/ONCOLOGY | Facility: CLINIC | Age: 76
End: 2021-01-29

## 2021-01-29 LAB — METHYLMALONATE SERPL-SCNC: 1 UMOL/L

## 2021-02-02 ENCOUNTER — OFFICE VISIT (OUTPATIENT)
Dept: FAMILY MEDICINE | Facility: CLINIC | Age: 76
End: 2021-02-02
Payer: MEDICARE

## 2021-02-02 VITALS
WEIGHT: 136.69 LBS | OXYGEN SATURATION: 99 % | TEMPERATURE: 98 F | DIASTOLIC BLOOD PRESSURE: 60 MMHG | HEART RATE: 58 BPM | HEIGHT: 65 IN | BODY MASS INDEX: 22.77 KG/M2 | SYSTOLIC BLOOD PRESSURE: 130 MMHG

## 2021-02-02 DIAGNOSIS — I27.20 PULMONARY HYPERTENSION: ICD-10-CM

## 2021-02-02 DIAGNOSIS — N18.6 ESRD (END STAGE RENAL DISEASE): ICD-10-CM

## 2021-02-02 DIAGNOSIS — E08.22 DIABETES MELLITUS DUE TO UNDERLYING CONDITION WITH CHRONIC KIDNEY DISEASE ON CHRONIC DIALYSIS, WITH LONG-TERM CURRENT USE OF INSULIN: ICD-10-CM

## 2021-02-02 DIAGNOSIS — Z99.2 DIABETES MELLITUS DUE TO UNDERLYING CONDITION WITH CHRONIC KIDNEY DISEASE ON CHRONIC DIALYSIS, WITH LONG-TERM CURRENT USE OF INSULIN: ICD-10-CM

## 2021-02-02 DIAGNOSIS — E11.42 DM TYPE 2 WITH DIABETIC PERIPHERAL NEUROPATHY: Primary | ICD-10-CM

## 2021-02-02 DIAGNOSIS — N18.6 DIABETES MELLITUS DUE TO UNDERLYING CONDITION WITH CHRONIC KIDNEY DISEASE ON CHRONIC DIALYSIS, WITH LONG-TERM CURRENT USE OF INSULIN: ICD-10-CM

## 2021-02-02 DIAGNOSIS — I15.2 HYPERTENSION ASSOCIATED WITH DIABETES: Chronic | ICD-10-CM

## 2021-02-02 DIAGNOSIS — Z79.4 DIABETES MELLITUS DUE TO UNDERLYING CONDITION WITH CHRONIC KIDNEY DISEASE ON CHRONIC DIALYSIS, WITH LONG-TERM CURRENT USE OF INSULIN: ICD-10-CM

## 2021-02-02 DIAGNOSIS — I34.0 NON-RHEUMATIC MITRAL REGURGITATION: ICD-10-CM

## 2021-02-02 DIAGNOSIS — Z12.31 ENCOUNTER FOR SCREENING MAMMOGRAM FOR MALIGNANT NEOPLASM OF BREAST: ICD-10-CM

## 2021-02-02 DIAGNOSIS — E11.59 HYPERTENSION ASSOCIATED WITH DIABETES: Chronic | ICD-10-CM

## 2021-02-02 DIAGNOSIS — E55.9 VITAMIN D DEFICIENCY: ICD-10-CM

## 2021-02-02 DIAGNOSIS — I25.111 CORONARY ARTERY DISEASE INVOLVING NATIVE CORONARY ARTERY OF NATIVE HEART WITH ANGINA PECTORIS WITH DOCUMENTED SPASM: ICD-10-CM

## 2021-02-02 DIAGNOSIS — I36.1 NON-RHEUMATIC TRICUSPID VALVE INSUFFICIENCY: ICD-10-CM

## 2021-02-02 DIAGNOSIS — Z78.0 ASYMPTOMATIC POSTMENOPAUSAL STATE: ICD-10-CM

## 2021-02-02 DIAGNOSIS — N18.5 STAGE 5 CHRONIC KIDNEY DISEASE: ICD-10-CM

## 2021-02-02 PROBLEM — I10 HYPERTENSION: Status: RESOLVED | Noted: 2019-04-05 | Resolved: 2021-02-02

## 2021-02-02 PROCEDURE — 1125F AMNT PAIN NOTED PAIN PRSNT: CPT | Mod: S$GLB,,, | Performed by: INTERNAL MEDICINE

## 2021-02-02 PROCEDURE — 3288F PR FALLS RISK ASSESSMENT DOCUMENTED: ICD-10-PCS | Mod: CPTII,S$GLB,, | Performed by: INTERNAL MEDICINE

## 2021-02-02 PROCEDURE — 99214 OFFICE O/P EST MOD 30 MIN: CPT | Mod: S$GLB,,, | Performed by: INTERNAL MEDICINE

## 2021-02-02 PROCEDURE — 3288F FALL RISK ASSESSMENT DOCD: CPT | Mod: CPTII,S$GLB,, | Performed by: INTERNAL MEDICINE

## 2021-02-02 PROCEDURE — 1125F PR PAIN SEVERITY QUANTIFIED, PAIN PRESENT: ICD-10-PCS | Mod: S$GLB,,, | Performed by: INTERNAL MEDICINE

## 2021-02-02 PROCEDURE — 3051F PR MOST RECENT HEMOGLOBIN A1C LEVEL 7.0 - < 8.0%: ICD-10-PCS | Mod: CPTII,S$GLB,, | Performed by: INTERNAL MEDICINE

## 2021-02-02 PROCEDURE — 1101F PT FALLS ASSESS-DOCD LE1/YR: CPT | Mod: CPTII,S$GLB,, | Performed by: INTERNAL MEDICINE

## 2021-02-02 PROCEDURE — 3008F BODY MASS INDEX DOCD: CPT | Mod: CPTII,S$GLB,, | Performed by: INTERNAL MEDICINE

## 2021-02-02 PROCEDURE — 1159F MED LIST DOCD IN RCRD: CPT | Mod: S$GLB,,, | Performed by: INTERNAL MEDICINE

## 2021-02-02 PROCEDURE — 1159F PR MEDICATION LIST DOCUMENTED IN MEDICAL RECORD: ICD-10-PCS | Mod: S$GLB,,, | Performed by: INTERNAL MEDICINE

## 2021-02-02 PROCEDURE — 3051F HG A1C>EQUAL 7.0%<8.0%: CPT | Mod: CPTII,S$GLB,, | Performed by: INTERNAL MEDICINE

## 2021-02-02 PROCEDURE — 1101F PR PT FALLS ASSESS DOC 0-1 FALLS W/OUT INJ PAST YR: ICD-10-PCS | Mod: CPTII,S$GLB,, | Performed by: INTERNAL MEDICINE

## 2021-02-02 PROCEDURE — 99214 PR OFFICE/OUTPT VISIT, EST, LEVL IV, 30-39 MIN: ICD-10-PCS | Mod: S$GLB,,, | Performed by: INTERNAL MEDICINE

## 2021-02-02 PROCEDURE — 3008F PR BODY MASS INDEX (BMI) DOCUMENTED: ICD-10-PCS | Mod: CPTII,S$GLB,, | Performed by: INTERNAL MEDICINE

## 2021-02-02 PROCEDURE — 99999 PR PBB SHADOW E&M-EST. PATIENT-LVL V: ICD-10-PCS | Mod: PBBFAC,,, | Performed by: INTERNAL MEDICINE

## 2021-02-02 PROCEDURE — 99999 PR PBB SHADOW E&M-EST. PATIENT-LVL V: CPT | Mod: PBBFAC,,, | Performed by: INTERNAL MEDICINE

## 2021-02-02 RX ORDER — ESOMEPRAZOLE MAGNESIUM 40 MG/1
CAPSULE, DELAYED RELEASE ORAL
COMMUNITY
End: 2021-12-06

## 2021-02-02 RX ORDER — TRIAMTERENE AND HYDROCHLOROTHIAZIDE 37.5; 25 MG/1; MG/1
CAPSULE ORAL
COMMUNITY
End: 2021-12-06

## 2021-02-02 RX ORDER — HYDROCHLOROTHIAZIDE 12.5 MG/1
CAPSULE ORAL
COMMUNITY
End: 2021-12-06

## 2021-02-02 RX ORDER — PRAVASTATIN SODIUM 40 MG/1
TABLET ORAL
COMMUNITY
End: 2021-12-06

## 2021-02-02 RX ORDER — GABAPENTIN 300 MG/1
300 CAPSULE ORAL NIGHTLY
COMMUNITY
End: 2021-12-20

## 2021-02-02 RX ORDER — HYDRALAZINE HYDROCHLORIDE 50 MG/1
TABLET, FILM COATED ORAL
COMMUNITY
End: 2021-03-26

## 2021-02-02 RX ORDER — LISINOPRIL 20 MG/1
TABLET ORAL
COMMUNITY
End: 2021-12-06

## 2021-02-02 RX ORDER — FUROSEMIDE 20 MG/1
TABLET ORAL
COMMUNITY
End: 2021-12-06

## 2021-02-03 ENCOUNTER — TELEPHONE (OUTPATIENT)
Dept: HEMATOLOGY/ONCOLOGY | Facility: CLINIC | Age: 76
End: 2021-02-03

## 2021-02-03 ENCOUNTER — PES CALL (OUTPATIENT)
Dept: ADMINISTRATIVE | Facility: CLINIC | Age: 76
End: 2021-02-03

## 2021-02-09 ENCOUNTER — PES CALL (OUTPATIENT)
Dept: ADMINISTRATIVE | Facility: CLINIC | Age: 76
End: 2021-02-09

## 2021-02-11 ENCOUNTER — HOSPITAL ENCOUNTER (OUTPATIENT)
Dept: RADIOLOGY | Facility: HOSPITAL | Age: 76
Discharge: HOME | End: 2021-02-11
Attending: INTERNAL MEDICINE
Payer: MEDICARE

## 2021-02-11 DIAGNOSIS — Z12.31 ENCOUNTER FOR SCREENING MAMMOGRAM FOR MALIGNANT NEOPLASM OF BREAST: ICD-10-CM

## 2021-02-11 PROCEDURE — 77067 MAMMO DIGITAL SCREENING BILAT WITH TOMO: ICD-10-PCS | Mod: 26,,, | Performed by: RADIOLOGY

## 2021-02-11 PROCEDURE — 77067 SCR MAMMO BI INCL CAD: CPT | Mod: TC

## 2021-02-11 PROCEDURE — 77067 SCR MAMMO BI INCL CAD: CPT | Mod: 26,,, | Performed by: RADIOLOGY

## 2021-02-11 PROCEDURE — 77063 MAMMO DIGITAL SCREENING BILAT WITH TOMO: ICD-10-PCS | Mod: 26,,, | Performed by: RADIOLOGY

## 2021-02-11 PROCEDURE — 77063 BREAST TOMOSYNTHESIS BI: CPT | Mod: 26,,, | Performed by: RADIOLOGY

## 2021-03-04 ENCOUNTER — LAB VISIT (OUTPATIENT)
Dept: LAB | Facility: HOSPITAL | Age: 76
End: 2021-03-04
Attending: NURSE PRACTITIONER
Payer: MEDICARE

## 2021-03-04 ENCOUNTER — OFFICE VISIT (OUTPATIENT)
Dept: HEMATOLOGY/ONCOLOGY | Facility: CLINIC | Age: 76
End: 2021-03-04
Payer: MEDICARE

## 2021-03-04 ENCOUNTER — OFFICE VISIT (OUTPATIENT)
Dept: CARDIOLOGY | Facility: CLINIC | Age: 76
End: 2021-03-04
Payer: MEDICARE

## 2021-03-04 VITALS
WEIGHT: 139.13 LBS | HEART RATE: 63 BPM | HEIGHT: 65 IN | SYSTOLIC BLOOD PRESSURE: 120 MMHG | RESPIRATION RATE: 18 BRPM | BODY MASS INDEX: 23.18 KG/M2 | DIASTOLIC BLOOD PRESSURE: 55 MMHG | OXYGEN SATURATION: 97 % | TEMPERATURE: 98 F

## 2021-03-04 DIAGNOSIS — I25.111 CORONARY ARTERY DISEASE INVOLVING NATIVE CORONARY ARTERY OF NATIVE HEART WITH ANGINA PECTORIS WITH DOCUMENTED SPASM: Primary | ICD-10-CM

## 2021-03-04 DIAGNOSIS — E78.5 HYPERLIPIDEMIA ASSOCIATED WITH TYPE 2 DIABETES MELLITUS: ICD-10-CM

## 2021-03-04 DIAGNOSIS — I34.0 NON-RHEUMATIC MITRAL REGURGITATION: ICD-10-CM

## 2021-03-04 DIAGNOSIS — R79.89 ELEVATED HOMOCYSTEINE: ICD-10-CM

## 2021-03-04 DIAGNOSIS — E53.8 B12 DEFICIENCY: ICD-10-CM

## 2021-03-04 DIAGNOSIS — I70.0 AORTIC ATHEROSCLEROSIS: ICD-10-CM

## 2021-03-04 DIAGNOSIS — I65.23 BILATERAL CAROTID ARTERY STENOSIS: ICD-10-CM

## 2021-03-04 DIAGNOSIS — R79.89 HIGH SERUM METHYLMALONIC ACID: ICD-10-CM

## 2021-03-04 DIAGNOSIS — N18.5 ANEMIA DUE TO STAGE 5 CHRONIC KIDNEY DISEASE: Primary | ICD-10-CM

## 2021-03-04 DIAGNOSIS — I15.2 HYPERTENSION ASSOCIATED WITH DIABETES: Chronic | ICD-10-CM

## 2021-03-04 DIAGNOSIS — E53.8 FOLIC ACID DEFICIENCY: ICD-10-CM

## 2021-03-04 DIAGNOSIS — N18.6 ESRD (END STAGE RENAL DISEASE): ICD-10-CM

## 2021-03-04 DIAGNOSIS — D63.1 ANEMIA DUE TO STAGE 5 CHRONIC KIDNEY DISEASE: Primary | ICD-10-CM

## 2021-03-04 DIAGNOSIS — E11.59 HYPERTENSION ASSOCIATED WITH DIABETES: Chronic | ICD-10-CM

## 2021-03-04 DIAGNOSIS — N18.5 ANEMIA DUE TO STAGE 5 CHRONIC KIDNEY DISEASE: ICD-10-CM

## 2021-03-04 DIAGNOSIS — D63.1 ANEMIA DUE TO STAGE 5 CHRONIC KIDNEY DISEASE: ICD-10-CM

## 2021-03-04 DIAGNOSIS — D75.89 MACROCYTOSIS WITHOUT ANEMIA: ICD-10-CM

## 2021-03-04 DIAGNOSIS — I36.1 NON-RHEUMATIC TRICUSPID VALVE INSUFFICIENCY: ICD-10-CM

## 2021-03-04 DIAGNOSIS — I27.20 PULMONARY HYPERTENSION: ICD-10-CM

## 2021-03-04 DIAGNOSIS — E11.69 HYPERLIPIDEMIA ASSOCIATED WITH TYPE 2 DIABETES MELLITUS: ICD-10-CM

## 2021-03-04 LAB
BASOPHILS # BLD AUTO: 0.03 K/UL (ref 0–0.2)
BASOPHILS NFR BLD: 0.4 % (ref 0–1.9)
DIFFERENTIAL METHOD: ABNORMAL
EOSINOPHIL # BLD AUTO: 0.1 K/UL (ref 0–0.5)
EOSINOPHIL NFR BLD: 1.7 % (ref 0–8)
ERYTHROCYTE [DISTWIDTH] IN BLOOD BY AUTOMATED COUNT: 12.9 % (ref 11.5–14.5)
HCT VFR BLD AUTO: 38.1 % (ref 37–48.5)
HGB BLD-MCNC: 12.7 G/DL (ref 12–16)
IMM GRANULOCYTES # BLD AUTO: 0.03 K/UL (ref 0–0.04)
IMM GRANULOCYTES NFR BLD AUTO: 0.4 % (ref 0–0.5)
LYMPHOCYTES # BLD AUTO: 1 K/UL (ref 1–4.8)
LYMPHOCYTES NFR BLD: 12.5 % (ref 18–48)
MCH RBC QN AUTO: 33.7 PG (ref 27–31)
MCHC RBC AUTO-ENTMCNC: 33.3 G/DL (ref 32–36)
MCV RBC AUTO: 101 FL (ref 82–98)
MONOCYTES # BLD AUTO: 0.4 K/UL (ref 0.3–1)
MONOCYTES NFR BLD: 5.6 % (ref 4–15)
NEUTROPHILS # BLD AUTO: 6.3 K/UL (ref 1.8–7.7)
NEUTROPHILS NFR BLD: 79.4 % (ref 38–73)
NRBC BLD-RTO: 0 /100 WBC
PLATELET # BLD AUTO: 227 K/UL (ref 150–350)
PMV BLD AUTO: 8.8 FL (ref 9.2–12.9)
RBC # BLD AUTO: 3.77 M/UL (ref 4–5.4)
WBC # BLD AUTO: 7.86 K/UL (ref 3.9–12.7)

## 2021-03-04 PROCEDURE — 99214 OFFICE O/P EST MOD 30 MIN: CPT | Mod: S$GLB,,, | Performed by: NURSE PRACTITIONER

## 2021-03-04 PROCEDURE — 1101F PR PT FALLS ASSESS DOC 0-1 FALLS W/OUT INJ PAST YR: ICD-10-PCS | Mod: CPTII,S$GLB,, | Performed by: NURSE PRACTITIONER

## 2021-03-04 PROCEDURE — 1159F PR MEDICATION LIST DOCUMENTED IN MEDICAL RECORD: ICD-10-PCS | Mod: S$GLB,,, | Performed by: NURSE PRACTITIONER

## 2021-03-04 PROCEDURE — 1125F PR PAIN SEVERITY QUANTIFIED, PAIN PRESENT: ICD-10-PCS | Mod: S$GLB,,, | Performed by: NURSE PRACTITIONER

## 2021-03-04 PROCEDURE — 99999 PR PBB SHADOW E&M-EST. PATIENT-LVL V: ICD-10-PCS | Mod: PBBFAC,,, | Performed by: NURSE PRACTITIONER

## 2021-03-04 PROCEDURE — 1159F MED LIST DOCD IN RCRD: CPT | Mod: S$GLB,,, | Performed by: NURSE PRACTITIONER

## 2021-03-04 PROCEDURE — 99499 NO LOS: ICD-10-PCS | Mod: S$GLB,,, | Performed by: INTERNAL MEDICINE

## 2021-03-04 PROCEDURE — 85025 COMPLETE CBC W/AUTO DIFF WBC: CPT | Performed by: NURSE PRACTITIONER

## 2021-03-04 PROCEDURE — 99499 UNLISTED E&M SERVICE: CPT | Mod: S$GLB,,, | Performed by: INTERNAL MEDICINE

## 2021-03-04 PROCEDURE — 1101F PT FALLS ASSESS-DOCD LE1/YR: CPT | Mod: CPTII,S$GLB,, | Performed by: NURSE PRACTITIONER

## 2021-03-04 PROCEDURE — 1125F AMNT PAIN NOTED PAIN PRSNT: CPT | Mod: S$GLB,,, | Performed by: NURSE PRACTITIONER

## 2021-03-04 PROCEDURE — 99999 PR PBB SHADOW E&M-EST. PATIENT-LVL V: CPT | Mod: PBBFAC,,, | Performed by: NURSE PRACTITIONER

## 2021-03-04 PROCEDURE — 3008F BODY MASS INDEX DOCD: CPT | Mod: CPTII,S$GLB,, | Performed by: NURSE PRACTITIONER

## 2021-03-04 PROCEDURE — 3008F PR BODY MASS INDEX (BMI) DOCUMENTED: ICD-10-PCS | Mod: CPTII,S$GLB,, | Performed by: NURSE PRACTITIONER

## 2021-03-04 PROCEDURE — 3288F FALL RISK ASSESSMENT DOCD: CPT | Mod: CPTII,S$GLB,, | Performed by: NURSE PRACTITIONER

## 2021-03-04 PROCEDURE — 99214 PR OFFICE/OUTPT VISIT, EST, LEVL IV, 30-39 MIN: ICD-10-PCS | Mod: S$GLB,,, | Performed by: NURSE PRACTITIONER

## 2021-03-04 PROCEDURE — 3288F PR FALLS RISK ASSESSMENT DOCUMENTED: ICD-10-PCS | Mod: CPTII,S$GLB,, | Performed by: NURSE PRACTITIONER

## 2021-03-04 PROCEDURE — 36415 COLL VENOUS BLD VENIPUNCTURE: CPT | Performed by: NURSE PRACTITIONER

## 2021-03-09 ENCOUNTER — OFFICE VISIT (OUTPATIENT)
Dept: OPHTHALMOLOGY | Facility: CLINIC | Age: 76
End: 2021-03-09
Payer: MEDICARE

## 2021-03-09 ENCOUNTER — OFFICE VISIT (OUTPATIENT)
Dept: CARDIOLOGY | Facility: CLINIC | Age: 76
End: 2021-03-09
Payer: MEDICARE

## 2021-03-09 VITALS
HEART RATE: 85 BPM | DIASTOLIC BLOOD PRESSURE: 70 MMHG | WEIGHT: 136.69 LBS | BODY MASS INDEX: 22.75 KG/M2 | SYSTOLIC BLOOD PRESSURE: 144 MMHG | OXYGEN SATURATION: 97 %

## 2021-03-09 DIAGNOSIS — I15.2 HYPERTENSION ASSOCIATED WITH DIABETES: Chronic | ICD-10-CM

## 2021-03-09 DIAGNOSIS — I34.0 NON-RHEUMATIC MITRAL REGURGITATION: ICD-10-CM

## 2021-03-09 DIAGNOSIS — E11.69 HYPERLIPIDEMIA ASSOCIATED WITH TYPE 2 DIABETES MELLITUS: ICD-10-CM

## 2021-03-09 DIAGNOSIS — E78.5 HYPERLIPIDEMIA ASSOCIATED WITH TYPE 2 DIABETES MELLITUS: ICD-10-CM

## 2021-03-09 DIAGNOSIS — Z79.4 TYPE 2 DIABETES MELLITUS WITH LEFT EYE AFFECTED BY MODERATE NONPROLIFERATIVE RETINOPATHY AND MACULAR EDEMA, WITH LONG-TERM CURRENT USE OF INSULIN: Primary | ICD-10-CM

## 2021-03-09 DIAGNOSIS — E11.36 DIABETIC CATARACT: ICD-10-CM

## 2021-03-09 DIAGNOSIS — N18.4 CHRONIC KIDNEY DISEASE (CKD) STAGE G4/A3, SEVERELY DECREASED GLOMERULAR FILTRATION RATE (GFR) BETWEEN 15-29 ML/MIN/1.73 SQUARE METER AND ALBUMINURIA CREATININE RATIO GREATER THAN 300 MG/G: ICD-10-CM

## 2021-03-09 DIAGNOSIS — E11.3312 TYPE 2 DIABETES MELLITUS WITH LEFT EYE AFFECTED BY MODERATE NONPROLIFERATIVE RETINOPATHY AND MACULAR EDEMA, WITH LONG-TERM CURRENT USE OF INSULIN: Primary | ICD-10-CM

## 2021-03-09 DIAGNOSIS — N18.6 ESRD (END STAGE RENAL DISEASE): ICD-10-CM

## 2021-03-09 DIAGNOSIS — I65.23 BILATERAL CAROTID ARTERY STENOSIS: ICD-10-CM

## 2021-03-09 DIAGNOSIS — E11.59 HYPERTENSION ASSOCIATED WITH DIABETES: Chronic | ICD-10-CM

## 2021-03-09 DIAGNOSIS — H18.421 BAND KERATOPATHY OF RIGHT EYE: ICD-10-CM

## 2021-03-09 DIAGNOSIS — I36.1 NON-RHEUMATIC TRICUSPID VALVE INSUFFICIENCY: Primary | ICD-10-CM

## 2021-03-09 DIAGNOSIS — I70.0 AORTIC ATHEROSCLEROSIS: ICD-10-CM

## 2021-03-09 DIAGNOSIS — H35.3221 EXUDATIVE AGE-RELATED MACULAR DEGENERATION OF LEFT EYE WITH ACTIVE CHOROIDAL NEOVASCULARIZATION: ICD-10-CM

## 2021-03-09 DIAGNOSIS — H21.42: ICD-10-CM

## 2021-03-09 DIAGNOSIS — Z86.73 HISTORY OF CVA (CEREBROVASCULAR ACCIDENT): ICD-10-CM

## 2021-03-09 DIAGNOSIS — I27.20 PULMONARY HYPERTENSION: ICD-10-CM

## 2021-03-09 DIAGNOSIS — I25.111 CORONARY ARTERY DISEASE INVOLVING NATIVE CORONARY ARTERY OF NATIVE HEART WITH ANGINA PECTORIS WITH DOCUMENTED SPASM: ICD-10-CM

## 2021-03-09 LAB
LEFT EYE DM RETINOPATHY: POSITIVE
RIGHT EYE DM RETINOPATHY: NEGATIVE

## 2021-03-09 PROCEDURE — 3008F BODY MASS INDEX DOCD: CPT | Mod: CPTII,S$GLB,, | Performed by: INTERNAL MEDICINE

## 2021-03-09 PROCEDURE — 1159F MED LIST DOCD IN RCRD: CPT | Mod: S$GLB,,, | Performed by: INTERNAL MEDICINE

## 2021-03-09 PROCEDURE — 99213 OFFICE O/P EST LOW 20 MIN: CPT | Mod: S$GLB,,, | Performed by: OPHTHALMOLOGY

## 2021-03-09 PROCEDURE — 1159F PR MEDICATION LIST DOCUMENTED IN MEDICAL RECORD: ICD-10-PCS | Mod: S$GLB,,, | Performed by: OPHTHALMOLOGY

## 2021-03-09 PROCEDURE — 92134 POSTERIOR SEGMENT OCT RETINA (OCULAR COHERENCE TOMOGRAPHY)-BOTH EYES: ICD-10-PCS | Mod: S$GLB,,, | Performed by: OPHTHALMOLOGY

## 2021-03-09 PROCEDURE — 1126F PR PAIN SEVERITY QUANTIFIED, NO PAIN PRESENT: ICD-10-PCS | Mod: S$GLB,,, | Performed by: OPHTHALMOLOGY

## 2021-03-09 PROCEDURE — 3008F PR BODY MASS INDEX (BMI) DOCUMENTED: ICD-10-PCS | Mod: CPTII,S$GLB,, | Performed by: INTERNAL MEDICINE

## 2021-03-09 PROCEDURE — 99999 PR PBB SHADOW E&M-EST. PATIENT-LVL III: ICD-10-PCS | Mod: PBBFAC,,, | Performed by: OPHTHALMOLOGY

## 2021-03-09 PROCEDURE — 99499 RISK ADDL DX/OHS AUDIT: ICD-10-PCS | Mod: S$GLB,,, | Performed by: INTERNAL MEDICINE

## 2021-03-09 PROCEDURE — 2024F PR 7 FIELD PHOTOS WITH INTERP/ REVIEW: ICD-10-PCS | Mod: S$GLB,,, | Performed by: OPHTHALMOLOGY

## 2021-03-09 PROCEDURE — 99214 OFFICE O/P EST MOD 30 MIN: CPT | Mod: S$GLB,,, | Performed by: INTERNAL MEDICINE

## 2021-03-09 PROCEDURE — 3051F HG A1C>EQUAL 7.0%<8.0%: CPT | Mod: CPTII,S$GLB,, | Performed by: INTERNAL MEDICINE

## 2021-03-09 PROCEDURE — 99499 UNLISTED E&M SERVICE: CPT | Mod: S$GLB,,, | Performed by: INTERNAL MEDICINE

## 2021-03-09 PROCEDURE — 92134 CPTRZ OPH DX IMG PST SGM RTA: CPT | Mod: S$GLB,,, | Performed by: OPHTHALMOLOGY

## 2021-03-09 PROCEDURE — 99999 PR PBB SHADOW E&M-EST. PATIENT-LVL III: ICD-10-PCS | Mod: PBBFAC,,, | Performed by: INTERNAL MEDICINE

## 2021-03-09 PROCEDURE — 1159F PR MEDICATION LIST DOCUMENTED IN MEDICAL RECORD: ICD-10-PCS | Mod: S$GLB,,, | Performed by: INTERNAL MEDICINE

## 2021-03-09 PROCEDURE — 1159F MED LIST DOCD IN RCRD: CPT | Mod: S$GLB,,, | Performed by: OPHTHALMOLOGY

## 2021-03-09 PROCEDURE — 99999 PR PBB SHADOW E&M-EST. PATIENT-LVL III: CPT | Mod: PBBFAC,,, | Performed by: OPHTHALMOLOGY

## 2021-03-09 PROCEDURE — 3051F HG A1C>EQUAL 7.0%<8.0%: CPT | Mod: CPTII,S$GLB,, | Performed by: OPHTHALMOLOGY

## 2021-03-09 PROCEDURE — 2024F 7 FLD RTA PHOTO EVC RTNOPTHY: CPT | Mod: S$GLB,,, | Performed by: OPHTHALMOLOGY

## 2021-03-09 PROCEDURE — 99999 PR PBB SHADOW E&M-EST. PATIENT-LVL III: CPT | Mod: PBBFAC,,, | Performed by: INTERNAL MEDICINE

## 2021-03-09 PROCEDURE — 99214 PR OFFICE/OUTPT VISIT, EST, LEVL IV, 30-39 MIN: ICD-10-PCS | Mod: S$GLB,,, | Performed by: INTERNAL MEDICINE

## 2021-03-09 PROCEDURE — 3051F PR MOST RECENT HEMOGLOBIN A1C LEVEL 7.0 - < 8.0%: ICD-10-PCS | Mod: CPTII,S$GLB,, | Performed by: OPHTHALMOLOGY

## 2021-03-09 PROCEDURE — 3051F PR MOST RECENT HEMOGLOBIN A1C LEVEL 7.0 - < 8.0%: ICD-10-PCS | Mod: CPTII,S$GLB,, | Performed by: INTERNAL MEDICINE

## 2021-03-09 PROCEDURE — 1126F AMNT PAIN NOTED NONE PRSNT: CPT | Mod: S$GLB,,, | Performed by: OPHTHALMOLOGY

## 2021-03-09 PROCEDURE — 99213 PR OFFICE/OUTPT VISIT, EST, LEVL III, 20-29 MIN: ICD-10-PCS | Mod: S$GLB,,, | Performed by: OPHTHALMOLOGY

## 2021-03-11 ENCOUNTER — OFFICE VISIT (OUTPATIENT)
Dept: FAMILY MEDICINE | Facility: CLINIC | Age: 76
End: 2021-03-11
Payer: MEDICARE

## 2021-03-11 VITALS
BODY MASS INDEX: 22.96 KG/M2 | RESPIRATION RATE: 18 BRPM | OXYGEN SATURATION: 98 % | HEART RATE: 86 BPM | SYSTOLIC BLOOD PRESSURE: 160 MMHG | WEIGHT: 137.81 LBS | DIASTOLIC BLOOD PRESSURE: 60 MMHG | HEIGHT: 65 IN | TEMPERATURE: 97 F

## 2021-03-11 DIAGNOSIS — E55.9 VITAMIN D DEFICIENCY: ICD-10-CM

## 2021-03-11 DIAGNOSIS — E21.3 HYPERPARATHYROIDISM, UNSPECIFIED: ICD-10-CM

## 2021-03-11 DIAGNOSIS — I15.2 HYPERTENSION ASSOCIATED WITH DIABETES: Chronic | ICD-10-CM

## 2021-03-11 DIAGNOSIS — E11.65 TYPE 2 DIABETES MELLITUS WITH HYPERGLYCEMIA, WITH LONG-TERM CURRENT USE OF INSULIN: ICD-10-CM

## 2021-03-11 DIAGNOSIS — Z99.2 ANEMIA IN CHRONIC KIDNEY DISEASE, ON CHRONIC DIALYSIS: ICD-10-CM

## 2021-03-11 DIAGNOSIS — E11.59 HYPERTENSION ASSOCIATED WITH DIABETES: Chronic | ICD-10-CM

## 2021-03-11 DIAGNOSIS — N18.6 ANEMIA IN CHRONIC KIDNEY DISEASE, ON CHRONIC DIALYSIS: ICD-10-CM

## 2021-03-11 DIAGNOSIS — R26.9 ABNORMALITY OF GAIT AND MOBILITY: ICD-10-CM

## 2021-03-11 DIAGNOSIS — Z99.2 ESRD ON DIALYSIS: ICD-10-CM

## 2021-03-11 DIAGNOSIS — E11.69 HYPERLIPIDEMIA ASSOCIATED WITH TYPE 2 DIABETES MELLITUS: ICD-10-CM

## 2021-03-11 DIAGNOSIS — Z79.4 TYPE 2 DIABETES MELLITUS WITH HYPERGLYCEMIA, WITH LONG-TERM CURRENT USE OF INSULIN: ICD-10-CM

## 2021-03-11 DIAGNOSIS — R56.9 CONVULSIONS, UNSPECIFIED CONVULSION TYPE: ICD-10-CM

## 2021-03-11 DIAGNOSIS — Z79.4 TYPE 2 DIABETES MELLITUS WITH DIABETIC PERIPHERAL ANGIOPATHY WITHOUT GANGRENE, WITH LONG-TERM CURRENT USE OF INSULIN: ICD-10-CM

## 2021-03-11 DIAGNOSIS — I27.20 PULMONARY HYPERTENSION: ICD-10-CM

## 2021-03-11 DIAGNOSIS — N18.6 ESRD ON DIALYSIS: ICD-10-CM

## 2021-03-11 DIAGNOSIS — E11.3553 TYPE 2 DIABETES MELLITUS WITH STABLE PROLIFERATIVE RETINOPATHY OF BOTH EYES, WITH LONG-TERM CURRENT USE OF INSULIN: ICD-10-CM

## 2021-03-11 DIAGNOSIS — E78.5 HYPERLIPIDEMIA ASSOCIATED WITH TYPE 2 DIABETES MELLITUS: ICD-10-CM

## 2021-03-11 DIAGNOSIS — I25.111 CORONARY ARTERY DISEASE INVOLVING NATIVE CORONARY ARTERY OF NATIVE HEART WITH ANGINA PECTORIS WITH DOCUMENTED SPASM: ICD-10-CM

## 2021-03-11 DIAGNOSIS — I65.23 BILATERAL CAROTID ARTERY STENOSIS: ICD-10-CM

## 2021-03-11 DIAGNOSIS — I70.0 AORTIC ATHEROSCLEROSIS: ICD-10-CM

## 2021-03-11 DIAGNOSIS — Z00.00 ENCOUNTER FOR PREVENTIVE HEALTH EXAMINATION: Primary | ICD-10-CM

## 2021-03-11 DIAGNOSIS — D63.1 ANEMIA IN CHRONIC KIDNEY DISEASE, ON CHRONIC DIALYSIS: ICD-10-CM

## 2021-03-11 DIAGNOSIS — H35.3221 EXUDATIVE AGE-RELATED MACULAR DEGENERATION OF LEFT EYE WITH ACTIVE CHOROIDAL NEOVASCULARIZATION: ICD-10-CM

## 2021-03-11 DIAGNOSIS — Z86.73 HISTORY OF CVA (CEREBROVASCULAR ACCIDENT): ICD-10-CM

## 2021-03-11 DIAGNOSIS — E11.51 TYPE 2 DIABETES MELLITUS WITH DIABETIC PERIPHERAL ANGIOPATHY WITHOUT GANGRENE, WITH LONG-TERM CURRENT USE OF INSULIN: ICD-10-CM

## 2021-03-11 DIAGNOSIS — Z79.4 TYPE 2 DIABETES MELLITUS WITH STABLE PROLIFERATIVE RETINOPATHY OF BOTH EYES, WITH LONG-TERM CURRENT USE OF INSULIN: ICD-10-CM

## 2021-03-11 PROBLEM — M46.96 UNSPECIFIED INFLAMMATORY SPONDYLOPATHY, LUMBAR REGION: Status: ACTIVE | Noted: 2021-03-11

## 2021-03-11 PROBLEM — G61.9 INFLAMMATORY AND TOXIC NEUROPATHY: Status: ACTIVE | Noted: 2021-03-11

## 2021-03-11 PROBLEM — G81.90 HEMIPLEGIA: Status: ACTIVE | Noted: 2021-03-11

## 2021-03-11 PROBLEM — G62.2 INFLAMMATORY AND TOXIC NEUROPATHY: Status: ACTIVE | Noted: 2021-03-11

## 2021-03-11 PROCEDURE — 99999 PR PBB SHADOW E&M-EST. PATIENT-LVL V: ICD-10-PCS | Mod: PBBFAC,,, | Performed by: NURSE PRACTITIONER

## 2021-03-11 PROCEDURE — 1101F PT FALLS ASSESS-DOCD LE1/YR: CPT | Mod: CPTII,S$GLB,, | Performed by: NURSE PRACTITIONER

## 2021-03-11 PROCEDURE — 3288F FALL RISK ASSESSMENT DOCD: CPT | Mod: CPTII,S$GLB,, | Performed by: NURSE PRACTITIONER

## 2021-03-11 PROCEDURE — 99999 PR PBB SHADOW E&M-EST. PATIENT-LVL V: CPT | Mod: PBBFAC,,, | Performed by: NURSE PRACTITIONER

## 2021-03-11 PROCEDURE — 1126F PR PAIN SEVERITY QUANTIFIED, NO PAIN PRESENT: ICD-10-PCS | Mod: S$GLB,,, | Performed by: NURSE PRACTITIONER

## 2021-03-11 PROCEDURE — 3051F PR MOST RECENT HEMOGLOBIN A1C LEVEL 7.0 - < 8.0%: ICD-10-PCS | Mod: CPTII,S$GLB,, | Performed by: NURSE PRACTITIONER

## 2021-03-11 PROCEDURE — 3008F PR BODY MASS INDEX (BMI) DOCUMENTED: ICD-10-PCS | Mod: CPTII,S$GLB,, | Performed by: NURSE PRACTITIONER

## 2021-03-11 PROCEDURE — 99499 RISK ADDL DX/OHS AUDIT: ICD-10-PCS | Mod: S$GLB,,, | Performed by: NURSE PRACTITIONER

## 2021-03-11 PROCEDURE — 1126F AMNT PAIN NOTED NONE PRSNT: CPT | Mod: S$GLB,,, | Performed by: NURSE PRACTITIONER

## 2021-03-11 PROCEDURE — 99499 UNLISTED E&M SERVICE: CPT | Mod: S$GLB,,, | Performed by: NURSE PRACTITIONER

## 2021-03-11 PROCEDURE — 3288F PR FALLS RISK ASSESSMENT DOCUMENTED: ICD-10-PCS | Mod: CPTII,S$GLB,, | Performed by: NURSE PRACTITIONER

## 2021-03-11 PROCEDURE — 1101F PR PT FALLS ASSESS DOC 0-1 FALLS W/OUT INJ PAST YR: ICD-10-PCS | Mod: CPTII,S$GLB,, | Performed by: NURSE PRACTITIONER

## 2021-03-11 PROCEDURE — G0439 PPPS, SUBSEQ VISIT: HCPCS | Mod: S$GLB,,, | Performed by: NURSE PRACTITIONER

## 2021-03-11 PROCEDURE — 3051F HG A1C>EQUAL 7.0%<8.0%: CPT | Mod: CPTII,S$GLB,, | Performed by: NURSE PRACTITIONER

## 2021-03-11 PROCEDURE — 3008F BODY MASS INDEX DOCD: CPT | Mod: CPTII,S$GLB,, | Performed by: NURSE PRACTITIONER

## 2021-03-11 PROCEDURE — G9919 SCRN ND POS ND PROV OF REC: HCPCS | Mod: CPTII,S$GLB,, | Performed by: NURSE PRACTITIONER

## 2021-03-11 PROCEDURE — 1158F PR ADVANCE CARE PLANNING DISCUSS DOCUMENTED IN MEDICAL RECORD: ICD-10-PCS | Mod: S$GLB,,, | Performed by: NURSE PRACTITIONER

## 2021-03-11 PROCEDURE — G9919 PR SCREENING AND POSITIVE: ICD-10-PCS | Mod: CPTII,S$GLB,, | Performed by: NURSE PRACTITIONER

## 2021-03-11 PROCEDURE — G0439 PR MEDICARE ANNUAL WELLNESS SUBSEQUENT VISIT: ICD-10-PCS | Mod: S$GLB,,, | Performed by: NURSE PRACTITIONER

## 2021-03-11 PROCEDURE — 1158F ADVNC CARE PLAN TLK DOCD: CPT | Mod: S$GLB,,, | Performed by: NURSE PRACTITIONER

## 2021-03-13 ENCOUNTER — NURSE TRIAGE (OUTPATIENT)
Dept: ADMINISTRATIVE | Facility: CLINIC | Age: 76
End: 2021-03-13

## 2021-03-13 ENCOUNTER — HOSPITAL ENCOUNTER (EMERGENCY)
Facility: HOSPITAL | Age: 76
Discharge: HOME | End: 2021-03-13
Attending: EMERGENCY MEDICINE
Payer: MEDICARE

## 2021-03-13 VITALS
RESPIRATION RATE: 18 BRPM | DIASTOLIC BLOOD PRESSURE: 66 MMHG | OXYGEN SATURATION: 95 % | BODY MASS INDEX: 23.14 KG/M2 | HEIGHT: 65 IN | WEIGHT: 138.88 LBS | SYSTOLIC BLOOD PRESSURE: 146 MMHG | TEMPERATURE: 99 F | HEART RATE: 66 BPM

## 2021-03-13 DIAGNOSIS — Z99.2 ESRD ON HEMODIALYSIS: ICD-10-CM

## 2021-03-13 DIAGNOSIS — N18.6 ESRD ON HEMODIALYSIS: ICD-10-CM

## 2021-03-13 DIAGNOSIS — I10 ESSENTIAL HYPERTENSION: Primary | ICD-10-CM

## 2021-03-13 PROCEDURE — 25000003 PHARM REV CODE 250: Performed by: NURSE PRACTITIONER

## 2021-03-13 PROCEDURE — 99283 EMERGENCY DEPT VISIT LOW MDM: CPT

## 2021-03-13 RX ORDER — ACETAMINOPHEN 500 MG
1000 TABLET ORAL
Status: COMPLETED | OUTPATIENT
Start: 2021-03-13 | End: 2021-03-13

## 2021-03-13 RX ADMIN — ACETAMINOPHEN 1000 MG: 500 TABLET ORAL at 12:03

## 2021-03-16 ENCOUNTER — OFFICE VISIT (OUTPATIENT)
Dept: PODIATRY | Facility: CLINIC | Age: 76
End: 2021-03-16
Payer: MEDICARE

## 2021-03-16 VITALS
SYSTOLIC BLOOD PRESSURE: 184 MMHG | BODY MASS INDEX: 22.97 KG/M2 | DIASTOLIC BLOOD PRESSURE: 70 MMHG | HEART RATE: 69 BPM | WEIGHT: 138 LBS

## 2021-03-16 DIAGNOSIS — E11.9 COMPREHENSIVE DIABETIC FOOT EXAMINATION, TYPE 2 DM, ENCOUNTER FOR: ICD-10-CM

## 2021-03-16 DIAGNOSIS — Z79.4 CONTROLLED TYPE 2 DIABETES MELLITUS WITH DIABETIC POLYNEUROPATHY, WITH LONG-TERM CURRENT USE OF INSULIN: Primary | ICD-10-CM

## 2021-03-16 DIAGNOSIS — B35.1 DERMATOPHYTOSIS OF NAIL: ICD-10-CM

## 2021-03-16 DIAGNOSIS — E11.42 CONTROLLED TYPE 2 DIABETES MELLITUS WITH DIABETIC POLYNEUROPATHY, WITH LONG-TERM CURRENT USE OF INSULIN: Primary | ICD-10-CM

## 2021-03-16 PROCEDURE — 3288F FALL RISK ASSESSMENT DOCD: CPT | Mod: CPTII,S$GLB,, | Performed by: PODIATRIST

## 2021-03-16 PROCEDURE — 3051F HG A1C>EQUAL 7.0%<8.0%: CPT | Mod: CPTII,S$GLB,, | Performed by: PODIATRIST

## 2021-03-16 PROCEDURE — 3008F PR BODY MASS INDEX (BMI) DOCUMENTED: ICD-10-PCS | Mod: CPTII,S$GLB,, | Performed by: PODIATRIST

## 2021-03-16 PROCEDURE — 11721 DEBRIDE NAIL 6 OR MORE: CPT | Mod: Q9,S$GLB,, | Performed by: PODIATRIST

## 2021-03-16 PROCEDURE — 11721 PR DEBRIDEMENT OF NAILS, 6 OR MORE: ICD-10-PCS | Mod: Q9,S$GLB,, | Performed by: PODIATRIST

## 2021-03-16 PROCEDURE — 3008F BODY MASS INDEX DOCD: CPT | Mod: CPTII,S$GLB,, | Performed by: PODIATRIST

## 2021-03-16 PROCEDURE — 1159F PR MEDICATION LIST DOCUMENTED IN MEDICAL RECORD: ICD-10-PCS | Mod: S$GLB,,, | Performed by: PODIATRIST

## 2021-03-16 PROCEDURE — 1159F MED LIST DOCD IN RCRD: CPT | Mod: S$GLB,,, | Performed by: PODIATRIST

## 2021-03-16 PROCEDURE — 1101F PT FALLS ASSESS-DOCD LE1/YR: CPT | Mod: CPTII,S$GLB,, | Performed by: PODIATRIST

## 2021-03-16 PROCEDURE — 3288F PR FALLS RISK ASSESSMENT DOCUMENTED: ICD-10-PCS | Mod: CPTII,S$GLB,, | Performed by: PODIATRIST

## 2021-03-16 PROCEDURE — 99999 PR PBB SHADOW E&M-EST. PATIENT-LVL IV: CPT | Mod: PBBFAC,,, | Performed by: PODIATRIST

## 2021-03-16 PROCEDURE — 99999 PR PBB SHADOW E&M-EST. PATIENT-LVL IV: ICD-10-PCS | Mod: PBBFAC,,, | Performed by: PODIATRIST

## 2021-03-16 PROCEDURE — 1101F PR PT FALLS ASSESS DOC 0-1 FALLS W/OUT INJ PAST YR: ICD-10-PCS | Mod: CPTII,S$GLB,, | Performed by: PODIATRIST

## 2021-03-16 PROCEDURE — 3051F PR MOST RECENT HEMOGLOBIN A1C LEVEL 7.0 - < 8.0%: ICD-10-PCS | Mod: CPTII,S$GLB,, | Performed by: PODIATRIST

## 2021-03-16 PROCEDURE — 99213 OFFICE O/P EST LOW 20 MIN: CPT | Mod: 25,S$GLB,, | Performed by: PODIATRIST

## 2021-03-16 PROCEDURE — 99213 PR OFFICE/OUTPT VISIT, EST, LEVL III, 20-29 MIN: ICD-10-PCS | Mod: 25,S$GLB,, | Performed by: PODIATRIST

## 2021-03-18 ENCOUNTER — PROCEDURE VISIT (OUTPATIENT)
Dept: OPHTHALMOLOGY | Facility: CLINIC | Age: 76
End: 2021-03-18
Payer: MEDICARE

## 2021-03-18 DIAGNOSIS — E11.3312 TYPE 2 DIABETES MELLITUS WITH LEFT EYE AFFECTED BY MODERATE NONPROLIFERATIVE RETINOPATHY AND MACULAR EDEMA, WITH LONG-TERM CURRENT USE OF INSULIN: Primary | ICD-10-CM

## 2021-03-18 DIAGNOSIS — Z79.4 TYPE 2 DIABETES MELLITUS WITH LEFT EYE AFFECTED BY MODERATE NONPROLIFERATIVE RETINOPATHY AND MACULAR EDEMA, WITH LONG-TERM CURRENT USE OF INSULIN: Primary | ICD-10-CM

## 2021-03-18 PROCEDURE — 92134 POSTERIOR SEGMENT OCT RETINA (OCULAR COHERENCE TOMOGRAPHY)-BOTH EYES: ICD-10-PCS | Mod: S$GLB,,, | Performed by: OPHTHALMOLOGY

## 2021-03-18 PROCEDURE — 99499 NO LOS: ICD-10-PCS | Mod: S$GLB,,, | Performed by: OPHTHALMOLOGY

## 2021-03-18 PROCEDURE — 99499 UNLISTED E&M SERVICE: CPT | Mod: S$GLB,,, | Performed by: OPHTHALMOLOGY

## 2021-03-18 PROCEDURE — 92134 CPTRZ OPH DX IMG PST SGM RTA: CPT | Mod: S$GLB,,, | Performed by: OPHTHALMOLOGY

## 2021-03-18 PROCEDURE — 67028 INJECTION EYE DRUG: CPT | Mod: LT,S$GLB,, | Performed by: OPHTHALMOLOGY

## 2021-03-18 PROCEDURE — 67028 PR INJECT INTRAVITREAL PHARMCOLOGIC: ICD-10-PCS | Mod: LT,S$GLB,, | Performed by: OPHTHALMOLOGY

## 2021-03-26 ENCOUNTER — OFFICE VISIT (OUTPATIENT)
Dept: CARDIOLOGY | Facility: CLINIC | Age: 76
End: 2021-03-26
Payer: MEDICARE

## 2021-03-26 VITALS
RESPIRATION RATE: 16 BRPM | OXYGEN SATURATION: 99 % | SYSTOLIC BLOOD PRESSURE: 142 MMHG | WEIGHT: 137.13 LBS | HEART RATE: 70 BPM | DIASTOLIC BLOOD PRESSURE: 50 MMHG | BODY MASS INDEX: 22.85 KG/M2 | HEIGHT: 65 IN

## 2021-03-26 DIAGNOSIS — I70.0 AORTIC ATHEROSCLEROSIS: ICD-10-CM

## 2021-03-26 DIAGNOSIS — I15.2 HYPERTENSION ASSOCIATED WITH DIABETES: Chronic | ICD-10-CM

## 2021-03-26 DIAGNOSIS — I36.1 NON-RHEUMATIC TRICUSPID VALVE INSUFFICIENCY: ICD-10-CM

## 2021-03-26 DIAGNOSIS — E11.51 TYPE 2 DIABETES MELLITUS WITH DIABETIC PERIPHERAL ANGIOPATHY WITHOUT GANGRENE, WITH LONG-TERM CURRENT USE OF INSULIN: ICD-10-CM

## 2021-03-26 DIAGNOSIS — I27.20 PULMONARY HYPERTENSION: ICD-10-CM

## 2021-03-26 DIAGNOSIS — E11.59 HYPERTENSION ASSOCIATED WITH DIABETES: Chronic | ICD-10-CM

## 2021-03-26 DIAGNOSIS — E78.5 HYPERLIPIDEMIA ASSOCIATED WITH TYPE 2 DIABETES MELLITUS: ICD-10-CM

## 2021-03-26 DIAGNOSIS — I65.23 BILATERAL CAROTID ARTERY STENOSIS: ICD-10-CM

## 2021-03-26 DIAGNOSIS — Z79.4 TYPE 2 DIABETES MELLITUS WITH DIABETIC PERIPHERAL ANGIOPATHY WITHOUT GANGRENE, WITH LONG-TERM CURRENT USE OF INSULIN: ICD-10-CM

## 2021-03-26 DIAGNOSIS — I25.111 CORONARY ARTERY DISEASE INVOLVING NATIVE CORONARY ARTERY OF NATIVE HEART WITH ANGINA PECTORIS WITH DOCUMENTED SPASM: Primary | ICD-10-CM

## 2021-03-26 DIAGNOSIS — I34.0 NON-RHEUMATIC MITRAL REGURGITATION: ICD-10-CM

## 2021-03-26 DIAGNOSIS — N18.6 ESRD ON DIALYSIS: ICD-10-CM

## 2021-03-26 DIAGNOSIS — E11.69 HYPERLIPIDEMIA ASSOCIATED WITH TYPE 2 DIABETES MELLITUS: ICD-10-CM

## 2021-03-26 DIAGNOSIS — Z99.2 ESRD ON DIALYSIS: ICD-10-CM

## 2021-03-26 PROCEDURE — 3008F PR BODY MASS INDEX (BMI) DOCUMENTED: ICD-10-PCS | Mod: CPTII,S$GLB,, | Performed by: INTERNAL MEDICINE

## 2021-03-26 PROCEDURE — 1159F PR MEDICATION LIST DOCUMENTED IN MEDICAL RECORD: ICD-10-PCS | Mod: S$GLB,,, | Performed by: INTERNAL MEDICINE

## 2021-03-26 PROCEDURE — 99214 PR OFFICE/OUTPT VISIT, EST, LEVL IV, 30-39 MIN: ICD-10-PCS | Mod: S$GLB,,, | Performed by: INTERNAL MEDICINE

## 2021-03-26 PROCEDURE — 99214 OFFICE O/P EST MOD 30 MIN: CPT | Mod: S$GLB,,, | Performed by: INTERNAL MEDICINE

## 2021-03-26 PROCEDURE — 3051F PR MOST RECENT HEMOGLOBIN A1C LEVEL 7.0 - < 8.0%: ICD-10-PCS | Mod: CPTII,S$GLB,, | Performed by: INTERNAL MEDICINE

## 2021-03-26 PROCEDURE — 3008F BODY MASS INDEX DOCD: CPT | Mod: CPTII,S$GLB,, | Performed by: INTERNAL MEDICINE

## 2021-03-26 PROCEDURE — 1159F MED LIST DOCD IN RCRD: CPT | Mod: S$GLB,,, | Performed by: INTERNAL MEDICINE

## 2021-03-26 PROCEDURE — 3051F HG A1C>EQUAL 7.0%<8.0%: CPT | Mod: CPTII,S$GLB,, | Performed by: INTERNAL MEDICINE

## 2021-03-26 PROCEDURE — 99999 PR PBB SHADOW E&M-EST. PATIENT-LVL V: CPT | Mod: PBBFAC,,, | Performed by: INTERNAL MEDICINE

## 2021-03-26 PROCEDURE — 99999 PR PBB SHADOW E&M-EST. PATIENT-LVL V: ICD-10-PCS | Mod: PBBFAC,,, | Performed by: INTERNAL MEDICINE

## 2021-03-26 RX ORDER — METOPROLOL TARTRATE 50 MG/1
50 TABLET ORAL 2 TIMES DAILY
Qty: 60 TABLET | Refills: 6 | Status: SHIPPED | OUTPATIENT
Start: 2021-03-26 | End: 2021-07-02 | Stop reason: SDUPTHER

## 2021-04-29 ENCOUNTER — PROCEDURE VISIT (OUTPATIENT)
Dept: OPHTHALMOLOGY | Facility: CLINIC | Age: 76
End: 2021-04-29
Payer: MEDICARE

## 2021-04-29 DIAGNOSIS — Z79.4 TYPE 2 DIABETES MELLITUS WITH LEFT EYE AFFECTED BY MODERATE NONPROLIFERATIVE RETINOPATHY AND MACULAR EDEMA, WITH LONG-TERM CURRENT USE OF INSULIN: Primary | ICD-10-CM

## 2021-04-29 DIAGNOSIS — E11.3312 TYPE 2 DIABETES MELLITUS WITH LEFT EYE AFFECTED BY MODERATE NONPROLIFERATIVE RETINOPATHY AND MACULAR EDEMA, WITH LONG-TERM CURRENT USE OF INSULIN: Primary | ICD-10-CM

## 2021-04-29 PROCEDURE — 99499 UNLISTED E&M SERVICE: CPT | Mod: S$GLB,,, | Performed by: OPHTHALMOLOGY

## 2021-04-29 PROCEDURE — 67028 PR INJECT INTRAVITREAL PHARMCOLOGIC: ICD-10-PCS | Mod: LT,S$GLB,, | Performed by: OPHTHALMOLOGY

## 2021-04-29 PROCEDURE — 92134 POSTERIOR SEGMENT OCT RETINA (OCULAR COHERENCE TOMOGRAPHY)-BOTH EYES: ICD-10-PCS | Mod: S$GLB,,, | Performed by: OPHTHALMOLOGY

## 2021-04-29 PROCEDURE — 99499 NO LOS: ICD-10-PCS | Mod: S$GLB,,, | Performed by: OPHTHALMOLOGY

## 2021-04-29 PROCEDURE — 92134 CPTRZ OPH DX IMG PST SGM RTA: CPT | Mod: S$GLB,,, | Performed by: OPHTHALMOLOGY

## 2021-04-29 PROCEDURE — 67028 INJECTION EYE DRUG: CPT | Mod: LT,S$GLB,, | Performed by: OPHTHALMOLOGY

## 2021-06-16 ENCOUNTER — PATIENT OUTREACH (OUTPATIENT)
Dept: ADMINISTRATIVE | Facility: OTHER | Age: 76
End: 2021-06-16

## 2021-06-16 DIAGNOSIS — E11.3553 TYPE 2 DIABETES MELLITUS WITH STABLE PROLIFERATIVE RETINOPATHY OF BOTH EYES, WITH LONG-TERM CURRENT USE OF INSULIN: Primary | ICD-10-CM

## 2021-06-16 DIAGNOSIS — Z79.4 TYPE 2 DIABETES MELLITUS WITH STABLE PROLIFERATIVE RETINOPATHY OF BOTH EYES, WITH LONG-TERM CURRENT USE OF INSULIN: Primary | ICD-10-CM

## 2021-06-17 ENCOUNTER — OFFICE VISIT (OUTPATIENT)
Dept: PODIATRY | Facility: CLINIC | Age: 76
End: 2021-06-17
Payer: MEDICARE

## 2021-06-17 VITALS — BODY MASS INDEX: 23.32 KG/M2 | WEIGHT: 140 LBS | HEIGHT: 65 IN

## 2021-06-17 DIAGNOSIS — B35.1 DERMATOPHYTOSIS OF NAIL: ICD-10-CM

## 2021-06-17 DIAGNOSIS — E11.42 CONTROLLED TYPE 2 DIABETES MELLITUS WITH DIABETIC POLYNEUROPATHY, WITH LONG-TERM CURRENT USE OF INSULIN: Primary | ICD-10-CM

## 2021-06-17 DIAGNOSIS — Z79.4 CONTROLLED TYPE 2 DIABETES MELLITUS WITH DIABETIC POLYNEUROPATHY, WITH LONG-TERM CURRENT USE OF INSULIN: Primary | ICD-10-CM

## 2021-06-17 PROCEDURE — 3288F PR FALLS RISK ASSESSMENT DOCUMENTED: ICD-10-PCS | Mod: CPTII,S$GLB,, | Performed by: PODIATRIST

## 2021-06-17 PROCEDURE — 3008F PR BODY MASS INDEX (BMI) DOCUMENTED: ICD-10-PCS | Mod: CPTII,S$GLB,, | Performed by: PODIATRIST

## 2021-06-17 PROCEDURE — 3008F BODY MASS INDEX DOCD: CPT | Mod: CPTII,S$GLB,, | Performed by: PODIATRIST

## 2021-06-17 PROCEDURE — 3051F PR MOST RECENT HEMOGLOBIN A1C LEVEL 7.0 - < 8.0%: ICD-10-PCS | Mod: CPTII,S$GLB,, | Performed by: PODIATRIST

## 2021-06-17 PROCEDURE — 1101F PT FALLS ASSESS-DOCD LE1/YR: CPT | Mod: CPTII,S$GLB,, | Performed by: PODIATRIST

## 2021-06-17 PROCEDURE — 99999 PR PBB SHADOW E&M-EST. PATIENT-LVL IV: CPT | Mod: PBBFAC,,, | Performed by: PODIATRIST

## 2021-06-17 PROCEDURE — 1101F PR PT FALLS ASSESS DOC 0-1 FALLS W/OUT INJ PAST YR: ICD-10-PCS | Mod: CPTII,S$GLB,, | Performed by: PODIATRIST

## 2021-06-17 PROCEDURE — 99999 PR PBB SHADOW E&M-EST. PATIENT-LVL IV: ICD-10-PCS | Mod: PBBFAC,,, | Performed by: PODIATRIST

## 2021-06-17 PROCEDURE — 99499 UNLISTED E&M SERVICE: CPT | Mod: S$GLB,,, | Performed by: PODIATRIST

## 2021-06-17 PROCEDURE — 3288F FALL RISK ASSESSMENT DOCD: CPT | Mod: CPTII,S$GLB,, | Performed by: PODIATRIST

## 2021-06-17 PROCEDURE — 11721 PR DEBRIDEMENT OF NAILS, 6 OR MORE: ICD-10-PCS | Mod: Q9,S$GLB,, | Performed by: PODIATRIST

## 2021-06-17 PROCEDURE — 99499 NO LOS: ICD-10-PCS | Mod: S$GLB,,, | Performed by: PODIATRIST

## 2021-06-17 PROCEDURE — 11721 DEBRIDE NAIL 6 OR MORE: CPT | Mod: Q9,S$GLB,, | Performed by: PODIATRIST

## 2021-06-17 PROCEDURE — 3051F HG A1C>EQUAL 7.0%<8.0%: CPT | Mod: CPTII,S$GLB,, | Performed by: PODIATRIST

## 2021-06-17 RX ORDER — AFLIBERCEPT 40 MG/ML
INJECTION, SOLUTION INTRAVITREAL
COMMUNITY
Start: 2021-03-18 | End: 2021-12-06

## 2021-06-24 ENCOUNTER — TELEPHONE (OUTPATIENT)
Dept: CARDIOLOGY | Facility: CLINIC | Age: 76
End: 2021-06-24

## 2021-06-27 ENCOUNTER — DOCUMENTATION ONLY (OUTPATIENT)
Dept: HEPATOLOGY | Facility: HOSPITAL | Age: 76
End: 2021-06-27

## 2021-07-02 ENCOUNTER — OFFICE VISIT (OUTPATIENT)
Dept: CARDIOLOGY | Facility: CLINIC | Age: 76
End: 2021-07-02
Payer: MEDICARE

## 2021-07-02 ENCOUNTER — TELEPHONE (OUTPATIENT)
Dept: PAIN MEDICINE | Facility: CLINIC | Age: 76
End: 2021-07-02

## 2021-07-02 ENCOUNTER — HOSPITAL ENCOUNTER (OUTPATIENT)
Dept: CARDIOLOGY | Facility: HOSPITAL | Age: 76
Discharge: HOME | End: 2021-07-02
Attending: INTERNAL MEDICINE
Payer: MEDICARE

## 2021-07-02 VITALS
DIASTOLIC BLOOD PRESSURE: 72 MMHG | SYSTOLIC BLOOD PRESSURE: 128 MMHG | BODY MASS INDEX: 22.75 KG/M2 | OXYGEN SATURATION: 99 % | WEIGHT: 136.69 LBS | HEART RATE: 64 BPM

## 2021-07-02 DIAGNOSIS — E11.69 HYPERLIPIDEMIA ASSOCIATED WITH TYPE 2 DIABETES MELLITUS: ICD-10-CM

## 2021-07-02 DIAGNOSIS — N18.6 ESRD ON DIALYSIS: ICD-10-CM

## 2021-07-02 DIAGNOSIS — I15.2 HYPERTENSION ASSOCIATED WITH DIABETES: Chronic | ICD-10-CM

## 2021-07-02 DIAGNOSIS — E78.5 HYPERLIPIDEMIA ASSOCIATED WITH TYPE 2 DIABETES MELLITUS: ICD-10-CM

## 2021-07-02 DIAGNOSIS — Z99.2 ESRD ON DIALYSIS: ICD-10-CM

## 2021-07-02 DIAGNOSIS — E11.51 TYPE 2 DIABETES MELLITUS WITH DIABETIC PERIPHERAL ANGIOPATHY WITHOUT GANGRENE, WITH LONG-TERM CURRENT USE OF INSULIN: ICD-10-CM

## 2021-07-02 DIAGNOSIS — E11.59 HYPERTENSION ASSOCIATED WITH DIABETES: Chronic | ICD-10-CM

## 2021-07-02 DIAGNOSIS — I36.1 NON-RHEUMATIC TRICUSPID VALVE INSUFFICIENCY: ICD-10-CM

## 2021-07-02 DIAGNOSIS — I34.0 NON-RHEUMATIC MITRAL REGURGITATION: ICD-10-CM

## 2021-07-02 DIAGNOSIS — Z79.4 TYPE 2 DIABETES MELLITUS WITH DIABETIC PERIPHERAL ANGIOPATHY WITHOUT GANGRENE, WITH LONG-TERM CURRENT USE OF INSULIN: ICD-10-CM

## 2021-07-02 DIAGNOSIS — Z86.73 HISTORY OF CVA (CEREBROVASCULAR ACCIDENT): ICD-10-CM

## 2021-07-02 DIAGNOSIS — I25.111 CORONARY ARTERY DISEASE INVOLVING NATIVE CORONARY ARTERY OF NATIVE HEART WITH ANGINA PECTORIS WITH DOCUMENTED SPASM: Primary | ICD-10-CM

## 2021-07-02 DIAGNOSIS — R07.9 CHEST PAIN, UNSPECIFIED TYPE: ICD-10-CM

## 2021-07-02 DIAGNOSIS — M54.9 BACK PAIN, UNSPECIFIED BACK LOCATION, UNSPECIFIED BACK PAIN LATERALITY, UNSPECIFIED CHRONICITY: ICD-10-CM

## 2021-07-02 PROCEDURE — 93005 ELECTROCARDIOGRAM TRACING: CPT

## 2021-07-02 PROCEDURE — 99999 PR PBB SHADOW E&M-EST. PATIENT-LVL III: CPT | Mod: PBBFAC,,, | Performed by: INTERNAL MEDICINE

## 2021-07-02 PROCEDURE — 99214 OFFICE O/P EST MOD 30 MIN: CPT | Mod: S$GLB,,, | Performed by: INTERNAL MEDICINE

## 2021-07-02 PROCEDURE — 3008F PR BODY MASS INDEX (BMI) DOCUMENTED: ICD-10-PCS | Mod: CPTII,S$GLB,, | Performed by: INTERNAL MEDICINE

## 2021-07-02 PROCEDURE — 99999 PR PBB SHADOW E&M-EST. PATIENT-LVL III: ICD-10-PCS | Mod: PBBFAC,,, | Performed by: INTERNAL MEDICINE

## 2021-07-02 PROCEDURE — 3008F BODY MASS INDEX DOCD: CPT | Mod: CPTII,S$GLB,, | Performed by: INTERNAL MEDICINE

## 2021-07-02 PROCEDURE — 99499 RISK ADDL DX/OHS AUDIT: ICD-10-PCS | Mod: S$GLB,,, | Performed by: INTERNAL MEDICINE

## 2021-07-02 PROCEDURE — 99499 UNLISTED E&M SERVICE: CPT | Mod: S$GLB,,, | Performed by: INTERNAL MEDICINE

## 2021-07-02 PROCEDURE — 93010 ELECTROCARDIOGRAM REPORT: CPT | Mod: ,,, | Performed by: INTERNAL MEDICINE

## 2021-07-02 PROCEDURE — 3051F PR MOST RECENT HEMOGLOBIN A1C LEVEL 7.0 - < 8.0%: ICD-10-PCS | Mod: CPTII,S$GLB,, | Performed by: INTERNAL MEDICINE

## 2021-07-02 PROCEDURE — 93010 EKG 12-LEAD: ICD-10-PCS | Mod: ,,, | Performed by: INTERNAL MEDICINE

## 2021-07-02 PROCEDURE — 99214 PR OFFICE/OUTPT VISIT, EST, LEVL IV, 30-39 MIN: ICD-10-PCS | Mod: S$GLB,,, | Performed by: INTERNAL MEDICINE

## 2021-07-02 PROCEDURE — 1159F PR MEDICATION LIST DOCUMENTED IN MEDICAL RECORD: ICD-10-PCS | Mod: S$GLB,,, | Performed by: INTERNAL MEDICINE

## 2021-07-02 PROCEDURE — 1159F MED LIST DOCD IN RCRD: CPT | Mod: S$GLB,,, | Performed by: INTERNAL MEDICINE

## 2021-07-02 PROCEDURE — 3051F HG A1C>EQUAL 7.0%<8.0%: CPT | Mod: CPTII,S$GLB,, | Performed by: INTERNAL MEDICINE

## 2021-07-02 RX ORDER — METOPROLOL TARTRATE 50 MG/1
50 TABLET ORAL 2 TIMES DAILY
Qty: 60 TABLET | Refills: 6 | Status: SHIPPED | OUTPATIENT
Start: 2021-07-02 | End: 2022-07-05 | Stop reason: SDUPTHER

## 2021-07-27 ENCOUNTER — OFFICE VISIT (OUTPATIENT)
Dept: INTERNAL MEDICINE | Facility: CLINIC | Age: 76
End: 2021-07-27
Payer: MEDICARE

## 2021-07-27 ENCOUNTER — LAB VISIT (OUTPATIENT)
Dept: LAB | Facility: HOSPITAL | Age: 76
End: 2021-07-27
Payer: MEDICARE

## 2021-07-27 ENCOUNTER — TELEPHONE (OUTPATIENT)
Dept: PAIN MEDICINE | Facility: CLINIC | Age: 76
End: 2021-07-27

## 2021-07-27 VITALS
TEMPERATURE: 98 F | HEIGHT: 65 IN | WEIGHT: 143.06 LBS | SYSTOLIC BLOOD PRESSURE: 150 MMHG | BODY MASS INDEX: 23.83 KG/M2 | HEART RATE: 66 BPM | DIASTOLIC BLOOD PRESSURE: 50 MMHG | OXYGEN SATURATION: 97 % | RESPIRATION RATE: 16 BRPM

## 2021-07-27 DIAGNOSIS — L29.9 ITCHING: ICD-10-CM

## 2021-07-27 DIAGNOSIS — L29.9 ITCHING: Primary | ICD-10-CM

## 2021-07-27 LAB — PHOSPHATE SERPL-MCNC: 3.6 MG/DL (ref 2.7–4.5)

## 2021-07-27 PROCEDURE — 1126F PR PAIN SEVERITY QUANTIFIED, NO PAIN PRESENT: ICD-10-PCS | Mod: CPTII,S$GLB,, | Performed by: NURSE PRACTITIONER

## 2021-07-27 PROCEDURE — 99999 PR PBB SHADOW E&M-EST. PATIENT-LVL V: ICD-10-PCS | Mod: PBBFAC,,, | Performed by: NURSE PRACTITIONER

## 2021-07-27 PROCEDURE — 99214 OFFICE O/P EST MOD 30 MIN: CPT | Mod: S$GLB,,, | Performed by: NURSE PRACTITIONER

## 2021-07-27 PROCEDURE — 3051F HG A1C>EQUAL 7.0%<8.0%: CPT | Mod: CPTII,S$GLB,, | Performed by: NURSE PRACTITIONER

## 2021-07-27 PROCEDURE — 3078F PR MOST RECENT DIASTOLIC BLOOD PRESSURE < 80 MM HG: ICD-10-PCS | Mod: CPTII,S$GLB,, | Performed by: NURSE PRACTITIONER

## 2021-07-27 PROCEDURE — 99214 PR OFFICE/OUTPT VISIT, EST, LEVL IV, 30-39 MIN: ICD-10-PCS | Mod: S$GLB,,, | Performed by: NURSE PRACTITIONER

## 2021-07-27 PROCEDURE — 1159F MED LIST DOCD IN RCRD: CPT | Mod: CPTII,S$GLB,, | Performed by: NURSE PRACTITIONER

## 2021-07-27 PROCEDURE — 3008F BODY MASS INDEX DOCD: CPT | Mod: CPTII,S$GLB,, | Performed by: NURSE PRACTITIONER

## 2021-07-27 PROCEDURE — 36415 COLL VENOUS BLD VENIPUNCTURE: CPT | Performed by: NURSE PRACTITIONER

## 2021-07-27 PROCEDURE — 84100 ASSAY OF PHOSPHORUS: CPT | Performed by: NURSE PRACTITIONER

## 2021-07-27 PROCEDURE — 3078F DIAST BP <80 MM HG: CPT | Mod: CPTII,S$GLB,, | Performed by: NURSE PRACTITIONER

## 2021-07-27 PROCEDURE — 3077F SYST BP >= 140 MM HG: CPT | Mod: CPTII,S$GLB,, | Performed by: NURSE PRACTITIONER

## 2021-07-27 PROCEDURE — 1159F PR MEDICATION LIST DOCUMENTED IN MEDICAL RECORD: ICD-10-PCS | Mod: CPTII,S$GLB,, | Performed by: NURSE PRACTITIONER

## 2021-07-27 PROCEDURE — 1126F AMNT PAIN NOTED NONE PRSNT: CPT | Mod: CPTII,S$GLB,, | Performed by: NURSE PRACTITIONER

## 2021-07-27 PROCEDURE — 3008F PR BODY MASS INDEX (BMI) DOCUMENTED: ICD-10-PCS | Mod: CPTII,S$GLB,, | Performed by: NURSE PRACTITIONER

## 2021-07-27 PROCEDURE — 3051F PR MOST RECENT HEMOGLOBIN A1C LEVEL 7.0 - < 8.0%: ICD-10-PCS | Mod: CPTII,S$GLB,, | Performed by: NURSE PRACTITIONER

## 2021-07-27 PROCEDURE — 99999 PR PBB SHADOW E&M-EST. PATIENT-LVL V: CPT | Mod: PBBFAC,,, | Performed by: NURSE PRACTITIONER

## 2021-07-27 PROCEDURE — 3077F PR MOST RECENT SYSTOLIC BLOOD PRESSURE >= 140 MM HG: ICD-10-PCS | Mod: CPTII,S$GLB,, | Performed by: NURSE PRACTITIONER

## 2021-07-27 RX ORDER — HYDROXYZINE HYDROCHLORIDE 10 MG/1
10 TABLET, FILM COATED ORAL 2 TIMES DAILY PRN
Qty: 10 TABLET | Refills: 0 | Status: SHIPPED | OUTPATIENT
Start: 2021-07-27 | End: 2021-12-06

## 2021-08-02 ENCOUNTER — TELEPHONE (OUTPATIENT)
Dept: PAIN MEDICINE | Facility: CLINIC | Age: 76
End: 2021-08-02

## 2021-08-10 ENCOUNTER — TELEPHONE (OUTPATIENT)
Dept: FAMILY MEDICINE | Facility: CLINIC | Age: 76
End: 2021-08-10

## 2021-08-12 ENCOUNTER — TELEPHONE (OUTPATIENT)
Dept: PAIN MEDICINE | Facility: CLINIC | Age: 76
End: 2021-08-12

## 2021-08-16 ENCOUNTER — TELEPHONE (OUTPATIENT)
Dept: PAIN MEDICINE | Facility: CLINIC | Age: 76
End: 2021-08-16

## 2021-08-17 ENCOUNTER — PATIENT OUTREACH (OUTPATIENT)
Dept: ADMINISTRATIVE | Facility: OTHER | Age: 76
End: 2021-08-17

## 2021-08-17 ENCOUNTER — TELEPHONE (OUTPATIENT)
Dept: PAIN MEDICINE | Facility: CLINIC | Age: 76
End: 2021-08-17

## 2021-09-03 ENCOUNTER — TELEPHONE (OUTPATIENT)
Dept: PAIN MEDICINE | Facility: CLINIC | Age: 76
End: 2021-09-03

## 2021-09-15 ENCOUNTER — OFFICE VISIT (OUTPATIENT)
Dept: CARDIOLOGY | Facility: CLINIC | Age: 76
End: 2021-09-15
Payer: MEDICARE

## 2021-09-15 VITALS
BODY MASS INDEX: 23.48 KG/M2 | SYSTOLIC BLOOD PRESSURE: 134 MMHG | DIASTOLIC BLOOD PRESSURE: 66 MMHG | WEIGHT: 141.13 LBS | HEART RATE: 68 BPM | OXYGEN SATURATION: 96 %

## 2021-09-15 DIAGNOSIS — I65.23 BILATERAL CAROTID ARTERY STENOSIS: ICD-10-CM

## 2021-09-15 DIAGNOSIS — Z99.2 ANEMIA IN CHRONIC KIDNEY DISEASE, ON CHRONIC DIALYSIS: ICD-10-CM

## 2021-09-15 DIAGNOSIS — E11.69 HYPERLIPIDEMIA ASSOCIATED WITH TYPE 2 DIABETES MELLITUS: ICD-10-CM

## 2021-09-15 DIAGNOSIS — N18.6 ESRD ON DIALYSIS: ICD-10-CM

## 2021-09-15 DIAGNOSIS — I25.111 CORONARY ARTERY DISEASE INVOLVING NATIVE CORONARY ARTERY OF NATIVE HEART WITH ANGINA PECTORIS WITH DOCUMENTED SPASM: Primary | ICD-10-CM

## 2021-09-15 DIAGNOSIS — Z86.73 HISTORY OF CVA (CEREBROVASCULAR ACCIDENT): ICD-10-CM

## 2021-09-15 DIAGNOSIS — E78.5 HYPERLIPIDEMIA ASSOCIATED WITH TYPE 2 DIABETES MELLITUS: ICD-10-CM

## 2021-09-15 DIAGNOSIS — I34.0 NON-RHEUMATIC MITRAL REGURGITATION: ICD-10-CM

## 2021-09-15 DIAGNOSIS — I27.20 PULMONARY HYPERTENSION: ICD-10-CM

## 2021-09-15 DIAGNOSIS — Z79.4 TYPE 2 DIABETES MELLITUS WITH DIABETIC PERIPHERAL ANGIOPATHY WITHOUT GANGRENE, WITH LONG-TERM CURRENT USE OF INSULIN: ICD-10-CM

## 2021-09-15 DIAGNOSIS — N18.6 ANEMIA IN CHRONIC KIDNEY DISEASE, ON CHRONIC DIALYSIS: ICD-10-CM

## 2021-09-15 DIAGNOSIS — Z99.2 ESRD ON DIALYSIS: ICD-10-CM

## 2021-09-15 DIAGNOSIS — D63.1 ANEMIA IN CHRONIC KIDNEY DISEASE, ON CHRONIC DIALYSIS: ICD-10-CM

## 2021-09-15 DIAGNOSIS — E11.51 TYPE 2 DIABETES MELLITUS WITH DIABETIC PERIPHERAL ANGIOPATHY WITHOUT GANGRENE, WITH LONG-TERM CURRENT USE OF INSULIN: ICD-10-CM

## 2021-09-15 DIAGNOSIS — L29.9 ITCHING: ICD-10-CM

## 2021-09-15 DIAGNOSIS — I70.0 AORTIC ATHEROSCLEROSIS: ICD-10-CM

## 2021-09-15 PROCEDURE — 3008F BODY MASS INDEX DOCD: CPT | Mod: CPTII,S$GLB,, | Performed by: INTERNAL MEDICINE

## 2021-09-15 PROCEDURE — 99499 RISK ADDL DX/OHS AUDIT: ICD-10-PCS | Mod: S$GLB,,, | Performed by: INTERNAL MEDICINE

## 2021-09-15 PROCEDURE — 99214 PR OFFICE/OUTPT VISIT, EST, LEVL IV, 30-39 MIN: ICD-10-PCS | Mod: S$GLB,,, | Performed by: INTERNAL MEDICINE

## 2021-09-15 PROCEDURE — 4010F ACE/ARB THERAPY RXD/TAKEN: CPT | Mod: CPTII,S$GLB,, | Performed by: INTERNAL MEDICINE

## 2021-09-15 PROCEDURE — 3008F PR BODY MASS INDEX (BMI) DOCUMENTED: ICD-10-PCS | Mod: CPTII,S$GLB,, | Performed by: INTERNAL MEDICINE

## 2021-09-15 PROCEDURE — 3051F HG A1C>EQUAL 7.0%<8.0%: CPT | Mod: CPTII,S$GLB,, | Performed by: INTERNAL MEDICINE

## 2021-09-15 PROCEDURE — 99499 UNLISTED E&M SERVICE: CPT | Mod: S$GLB,,, | Performed by: INTERNAL MEDICINE

## 2021-09-15 PROCEDURE — 3078F PR MOST RECENT DIASTOLIC BLOOD PRESSURE < 80 MM HG: ICD-10-PCS | Mod: CPTII,S$GLB,, | Performed by: INTERNAL MEDICINE

## 2021-09-15 PROCEDURE — 99214 OFFICE O/P EST MOD 30 MIN: CPT | Mod: S$GLB,,, | Performed by: INTERNAL MEDICINE

## 2021-09-15 PROCEDURE — 1159F PR MEDICATION LIST DOCUMENTED IN MEDICAL RECORD: ICD-10-PCS | Mod: CPTII,S$GLB,, | Performed by: INTERNAL MEDICINE

## 2021-09-15 PROCEDURE — 4010F PR ACE/ARB THEARPY RXD/TAKEN: ICD-10-PCS | Mod: CPTII,S$GLB,, | Performed by: INTERNAL MEDICINE

## 2021-09-15 PROCEDURE — 3051F PR MOST RECENT HEMOGLOBIN A1C LEVEL 7.0 - < 8.0%: ICD-10-PCS | Mod: CPTII,S$GLB,, | Performed by: INTERNAL MEDICINE

## 2021-09-15 PROCEDURE — 3075F SYST BP GE 130 - 139MM HG: CPT | Mod: CPTII,S$GLB,, | Performed by: INTERNAL MEDICINE

## 2021-09-15 PROCEDURE — 3078F DIAST BP <80 MM HG: CPT | Mod: CPTII,S$GLB,, | Performed by: INTERNAL MEDICINE

## 2021-09-15 PROCEDURE — 3075F PR MOST RECENT SYSTOLIC BLOOD PRESS GE 130-139MM HG: ICD-10-PCS | Mod: CPTII,S$GLB,, | Performed by: INTERNAL MEDICINE

## 2021-09-15 PROCEDURE — 99999 PR PBB SHADOW E&M-EST. PATIENT-LVL V: ICD-10-PCS | Mod: PBBFAC,,, | Performed by: INTERNAL MEDICINE

## 2021-09-15 PROCEDURE — 1159F MED LIST DOCD IN RCRD: CPT | Mod: CPTII,S$GLB,, | Performed by: INTERNAL MEDICINE

## 2021-09-15 PROCEDURE — 99999 PR PBB SHADOW E&M-EST. PATIENT-LVL V: CPT | Mod: PBBFAC,,, | Performed by: INTERNAL MEDICINE

## 2021-09-17 ENCOUNTER — TELEPHONE (OUTPATIENT)
Dept: TRANSPLANT | Facility: CLINIC | Age: 76
End: 2021-09-17

## 2021-09-22 DIAGNOSIS — Z76.82 ORGAN TRANSPLANT CANDIDATE: Primary | ICD-10-CM

## 2021-09-28 ENCOUNTER — TELEPHONE (OUTPATIENT)
Dept: TRANSPLANT | Facility: CLINIC | Age: 76
End: 2021-09-28

## 2021-09-28 ENCOUNTER — HOSPITAL ENCOUNTER (OUTPATIENT)
Dept: CARDIOLOGY | Facility: HOSPITAL | Age: 76
Discharge: HOME | End: 2021-09-28
Attending: INTERNAL MEDICINE
Payer: MEDICARE

## 2021-09-28 VITALS
DIASTOLIC BLOOD PRESSURE: 65 MMHG | HEIGHT: 65 IN | BODY MASS INDEX: 23.49 KG/M2 | WEIGHT: 141 LBS | SYSTOLIC BLOOD PRESSURE: 148 MMHG

## 2021-09-28 DIAGNOSIS — I65.23 BILATERAL CAROTID ARTERY STENOSIS: ICD-10-CM

## 2021-09-28 LAB
LEFT CBA DIAS: 13 CM/S
LEFT CBA SYS: 101 CM/S
LEFT CCA DIST DIAS: 6 CM/S
LEFT CCA DIST SYS: 81 CM/S
LEFT CCA MID DIAS: 6 CM/S
LEFT CCA MID SYS: 84 CM/S
LEFT CCA PROX DIAS: 6 CM/S
LEFT CCA PROX SYS: 72 CM/S
LEFT ECA DIAS: 5 CM/S
LEFT ECA SYS: 258 CM/S
LEFT ICA DIST DIAS: 21 CM/S
LEFT ICA DIST SYS: 127 CM/S
LEFT ICA MID DIAS: 17 CM/S
LEFT ICA MID SYS: 90 CM/S
LEFT ICA PROX DIAS: 12 CM/S
LEFT ICA PROX SYS: 87 CM/S
LEFT VERTEBRAL DIAS: 5 CM/S
LEFT VERTEBRAL SYS: 50 CM/S
OHS CV CAROTID RIGHT ICA EDV HIGHEST: 20
OHS CV CAROTID ULTRASOUND LEFT ICA/CCA RATIO: 1.57
OHS CV CAROTID ULTRASOUND RIGHT ICA/CCA RATIO: 1.96
OHS CV PV CAROTID LEFT HIGHEST CCA: 84
OHS CV PV CAROTID LEFT HIGHEST ICA: 127
OHS CV PV CAROTID RIGHT HIGHEST CCA: 61
OHS CV PV CAROTID RIGHT HIGHEST ICA: 112
OHS CV US CAROTID LEFT HIGHEST EDV: 21
RIGHT ARM DIASTOLIC BLOOD PRESSURE: 65 MMHG
RIGHT ARM SYSTOLIC BLOOD PRESSURE: 148 MMHG
RIGHT CBA DIAS: 5 CM/S
RIGHT CBA SYS: 55 CM/S
RIGHT CCA DIST DIAS: 3 CM/S
RIGHT CCA DIST SYS: 57 CM/S
RIGHT CCA MID DIAS: 3 CM/S
RIGHT CCA MID SYS: 59 CM/S
RIGHT CCA PROX DIAS: 0 CM/S
RIGHT CCA PROX SYS: 61 CM/S
RIGHT ECA DIAS: 5 CM/S
RIGHT ECA SYS: 220 CM/S
RIGHT ICA DIST DIAS: 20 CM/S
RIGHT ICA DIST SYS: 112 CM/S
RIGHT ICA MID DIAS: 15 CM/S
RIGHT ICA MID SYS: 72 CM/S
RIGHT ICA PROX DIAS: 7 CM/S
RIGHT ICA PROX SYS: 47 CM/S
RIGHT VERTEBRAL DIAS: 6 CM/S
RIGHT VERTEBRAL SYS: 43 CM/S

## 2021-09-28 PROCEDURE — 93880 EXTRACRANIAL BILAT STUDY: CPT | Mod: 26,NTX,, | Performed by: INTERNAL MEDICINE

## 2021-09-28 PROCEDURE — 93880 EXTRACRANIAL BILAT STUDY: CPT | Mod: NTX

## 2021-09-28 PROCEDURE — 93880 CV US DOPPLER CAROTID (CUPID ONLY): ICD-10-PCS | Mod: 26,NTX,, | Performed by: INTERNAL MEDICINE

## 2021-10-01 ENCOUNTER — TELEPHONE (OUTPATIENT)
Dept: CARDIOLOGY | Facility: CLINIC | Age: 76
End: 2021-10-01

## 2021-10-08 ENCOUNTER — TELEPHONE (OUTPATIENT)
Dept: CARDIOLOGY | Facility: CLINIC | Age: 76
End: 2021-10-08

## 2021-10-13 ENCOUNTER — HOSPITAL ENCOUNTER (EMERGENCY)
Facility: HOSPITAL | Age: 76
Discharge: HOME | End: 2021-10-13
Attending: EMERGENCY MEDICINE
Payer: MEDICARE

## 2021-10-13 VITALS
TEMPERATURE: 98 F | DIASTOLIC BLOOD PRESSURE: 84 MMHG | OXYGEN SATURATION: 99 % | SYSTOLIC BLOOD PRESSURE: 142 MMHG | RESPIRATION RATE: 16 BRPM | HEART RATE: 65 BPM

## 2021-10-13 DIAGNOSIS — I10 CHRONIC HYPERTENSION: ICD-10-CM

## 2021-10-13 DIAGNOSIS — N18.6 ESRD (END STAGE RENAL DISEASE) ON DIALYSIS: Primary | ICD-10-CM

## 2021-10-13 DIAGNOSIS — R11.2 NAUSEA & VOMITING: ICD-10-CM

## 2021-10-13 DIAGNOSIS — I10 HYPERTENSION: ICD-10-CM

## 2021-10-13 DIAGNOSIS — Z91.199 NONCOMPLIANCE: ICD-10-CM

## 2021-10-13 DIAGNOSIS — Z99.2 ESRD (END STAGE RENAL DISEASE) ON DIALYSIS: Primary | ICD-10-CM

## 2021-10-13 LAB
ALBUMIN SERPL BCP-MCNC: 3.6 G/DL (ref 3.5–5.2)
ALP SERPL-CCNC: 84 U/L (ref 55–135)
ALT SERPL W/O P-5'-P-CCNC: 18 U/L (ref 10–44)
ANION GAP SERPL CALC-SCNC: 15 MMOL/L (ref 8–16)
AST SERPL-CCNC: 17 U/L (ref 10–40)
BASOPHILS # BLD AUTO: 0.02 K/UL (ref 0–0.2)
BASOPHILS NFR BLD: 0.5 % (ref 0–1.9)
BILIRUB SERPL-MCNC: 0.4 MG/DL (ref 0.1–1)
BUN SERPL-MCNC: 24 MG/DL (ref 8–23)
CALCIUM SERPL-MCNC: 10.4 MG/DL (ref 8.7–10.5)
CHLORIDE SERPL-SCNC: 96 MMOL/L (ref 95–110)
CO2 SERPL-SCNC: 27 MMOL/L (ref 23–29)
CREAT SERPL-MCNC: 3.7 MG/DL (ref 0.5–1.4)
DIFFERENTIAL METHOD: ABNORMAL
EOSINOPHIL # BLD AUTO: 0 K/UL (ref 0–0.5)
EOSINOPHIL NFR BLD: 0.5 % (ref 0–8)
ERYTHROCYTE [DISTWIDTH] IN BLOOD BY AUTOMATED COUNT: 12.2 % (ref 11.5–14.5)
EST. GFR  (AFRICAN AMERICAN): 13 ML/MIN/1.73 M^2
EST. GFR  (NON AFRICAN AMERICAN): 11 ML/MIN/1.73 M^2
GLUCOSE SERPL-MCNC: 352 MG/DL (ref 70–110)
HCT VFR BLD AUTO: 42.1 % (ref 37–48.5)
HCV AB SERPL QL IA: NEGATIVE
HEP C VIRUS HOLD SPECIMEN: NORMAL
HGB BLD-MCNC: 13.9 G/DL (ref 12–16)
IMM GRANULOCYTES # BLD AUTO: 0.01 K/UL (ref 0–0.04)
IMM GRANULOCYTES NFR BLD AUTO: 0.3 % (ref 0–0.5)
LIPASE SERPL-CCNC: 12 U/L (ref 4–60)
LYMPHOCYTES # BLD AUTO: 0.5 K/UL (ref 1–4.8)
LYMPHOCYTES NFR BLD: 11.5 % (ref 18–48)
MCH RBC QN AUTO: 33.2 PG (ref 27–31)
MCHC RBC AUTO-ENTMCNC: 33 G/DL (ref 32–36)
MCV RBC AUTO: 101 FL (ref 82–98)
MONOCYTES # BLD AUTO: 0.2 K/UL (ref 0.3–1)
MONOCYTES NFR BLD: 4.3 % (ref 4–15)
NEUTROPHILS # BLD AUTO: 3.3 K/UL (ref 1.8–7.7)
NEUTROPHILS NFR BLD: 82.9 % (ref 38–73)
NRBC BLD-RTO: 0 /100 WBC
PLATELET # BLD AUTO: 182 K/UL (ref 150–450)
PMV BLD AUTO: 8.9 FL (ref 9.2–12.9)
POCT GLUCOSE: 290 MG/DL (ref 70–110)
POTASSIUM SERPL-SCNC: 4.1 MMOL/L (ref 3.5–5.1)
PROT SERPL-MCNC: 7.5 G/DL (ref 6–8.4)
RBC # BLD AUTO: 4.19 M/UL (ref 4–5.4)
SODIUM SERPL-SCNC: 138 MMOL/L (ref 136–145)
WBC # BLD AUTO: 3.99 K/UL (ref 3.9–12.7)

## 2021-10-13 PROCEDURE — 85025 COMPLETE CBC W/AUTO DIFF WBC: CPT | Mod: HCNC,NTX | Performed by: PHYSICIAN ASSISTANT

## 2021-10-13 PROCEDURE — 93010 EKG 12-LEAD: ICD-10-PCS | Mod: HCNC,NTX,, | Performed by: INTERNAL MEDICINE

## 2021-10-13 PROCEDURE — 83690 ASSAY OF LIPASE: CPT | Mod: HCNC,NTX | Performed by: PHYSICIAN ASSISTANT

## 2021-10-13 PROCEDURE — 93005 ELECTROCARDIOGRAM TRACING: CPT | Mod: HCNC,NTX

## 2021-10-13 PROCEDURE — 82962 GLUCOSE BLOOD TEST: CPT | Mod: HCNC,NTX

## 2021-10-13 PROCEDURE — 86803 HEPATITIS C AB TEST: CPT | Mod: HCNC,NTX | Performed by: EMERGENCY MEDICINE

## 2021-10-13 PROCEDURE — 99285 EMERGENCY DEPT VISIT HI MDM: CPT | Mod: 25,HCNC,NTX

## 2021-10-13 PROCEDURE — 63600175 PHARM REV CODE 636 W HCPCS: Mod: HCNC,NTX | Performed by: EMERGENCY MEDICINE

## 2021-10-13 PROCEDURE — 80053 COMPREHEN METABOLIC PANEL: CPT | Mod: HCNC,NTX | Performed by: PHYSICIAN ASSISTANT

## 2021-10-13 PROCEDURE — 25000003 PHARM REV CODE 250: Mod: HCNC,NTX | Performed by: EMERGENCY MEDICINE

## 2021-10-13 PROCEDURE — 96374 THER/PROPH/DIAG INJ IV PUSH: CPT | Mod: HCNC,NTX

## 2021-10-13 PROCEDURE — 93010 ELECTROCARDIOGRAM REPORT: CPT | Mod: HCNC,NTX,, | Performed by: INTERNAL MEDICINE

## 2021-10-13 RX ORDER — CLONIDINE HYDROCHLORIDE 0.2 MG/1
0.2 TABLET ORAL
Status: COMPLETED | OUTPATIENT
Start: 2021-10-13 | End: 2021-10-13

## 2021-10-13 RX ORDER — HYDRALAZINE HYDROCHLORIDE 20 MG/ML
20 INJECTION INTRAMUSCULAR; INTRAVENOUS
Status: COMPLETED | OUTPATIENT
Start: 2021-10-13 | End: 2021-10-13

## 2021-10-13 RX ADMIN — CLONIDINE HYDROCHLORIDE 0.2 MG: 0.2 TABLET ORAL at 02:10

## 2021-10-13 RX ADMIN — HYDRALAZINE HYDROCHLORIDE 20 MG: 20 INJECTION, SOLUTION INTRAMUSCULAR; INTRAVENOUS at 03:10

## 2021-10-19 ENCOUNTER — LAB VISIT (OUTPATIENT)
Dept: LAB | Facility: HOSPITAL | Age: 76
End: 2021-10-19
Attending: REGISTERED NURSE
Payer: MEDICARE

## 2021-10-19 ENCOUNTER — OFFICE VISIT (OUTPATIENT)
Dept: FAMILY MEDICINE | Facility: CLINIC | Age: 76
End: 2021-10-19
Payer: MEDICARE

## 2021-10-19 VITALS
BODY MASS INDEX: 23.91 KG/M2 | WEIGHT: 143.5 LBS | TEMPERATURE: 98 F | HEART RATE: 62 BPM | OXYGEN SATURATION: 97 % | DIASTOLIC BLOOD PRESSURE: 54 MMHG | HEIGHT: 65 IN | SYSTOLIC BLOOD PRESSURE: 142 MMHG

## 2021-10-19 DIAGNOSIS — E11.22 TYPE 2 DIABETES MELLITUS WITH CHRONIC KIDNEY DISEASE ON CHRONIC DIALYSIS, WITH LONG-TERM CURRENT USE OF INSULIN: Primary | ICD-10-CM

## 2021-10-19 DIAGNOSIS — Z79.4 TYPE 2 DIABETES MELLITUS WITH CHRONIC KIDNEY DISEASE ON CHRONIC DIALYSIS, WITH LONG-TERM CURRENT USE OF INSULIN: Primary | ICD-10-CM

## 2021-10-19 DIAGNOSIS — E11.22 TYPE 2 DIABETES MELLITUS WITH CHRONIC KIDNEY DISEASE ON CHRONIC DIALYSIS, WITH LONG-TERM CURRENT USE OF INSULIN: ICD-10-CM

## 2021-10-19 DIAGNOSIS — Z79.4 TYPE 2 DIABETES MELLITUS WITH CHRONIC KIDNEY DISEASE ON CHRONIC DIALYSIS, WITH LONG-TERM CURRENT USE OF INSULIN: ICD-10-CM

## 2021-10-19 DIAGNOSIS — Z99.2 TYPE 2 DIABETES MELLITUS WITH CHRONIC KIDNEY DISEASE ON CHRONIC DIALYSIS, WITH LONG-TERM CURRENT USE OF INSULIN: Primary | ICD-10-CM

## 2021-10-19 DIAGNOSIS — T82.898A OTHER SPECIFIED COMPLICATION OF VASCULAR PROSTHETIC DEVICES, IMPLANTS AND GRAFTS, INITIAL ENCOUNTER: ICD-10-CM

## 2021-10-19 DIAGNOSIS — Z99.2 TYPE 2 DIABETES MELLITUS WITH CHRONIC KIDNEY DISEASE ON CHRONIC DIALYSIS, WITH LONG-TERM CURRENT USE OF INSULIN: ICD-10-CM

## 2021-10-19 DIAGNOSIS — N18.6 TYPE 2 DIABETES MELLITUS WITH CHRONIC KIDNEY DISEASE ON CHRONIC DIALYSIS, WITH LONG-TERM CURRENT USE OF INSULIN: Primary | ICD-10-CM

## 2021-10-19 DIAGNOSIS — N18.6 TYPE 2 DIABETES MELLITUS WITH CHRONIC KIDNEY DISEASE ON CHRONIC DIALYSIS, WITH LONG-TERM CURRENT USE OF INSULIN: ICD-10-CM

## 2021-10-19 DIAGNOSIS — M46.96 UNSPECIFIED INFLAMMATORY SPONDYLOPATHY, LUMBAR REGION: ICD-10-CM

## 2021-10-19 DIAGNOSIS — G81.90 HEMIPLEGIA, UNSPECIFIED ETIOLOGY, UNSPECIFIED HEMIPLEGIA TYPE, UNSPECIFIED LATERALITY: ICD-10-CM

## 2021-10-19 LAB
CHOLEST SERPL-MCNC: 246 MG/DL (ref 120–199)
CHOLEST/HDLC SERPL: 4 {RATIO} (ref 2–5)
ESTIMATED AVG GLUCOSE: 200 MG/DL (ref 68–131)
HBA1C MFR BLD: 8.6 % (ref 4–5.6)
HDLC SERPL-MCNC: 62 MG/DL (ref 40–75)
HDLC SERPL: 25.2 % (ref 20–50)
LDLC SERPL CALC-MCNC: 159.6 MG/DL (ref 63–159)
NONHDLC SERPL-MCNC: 184 MG/DL
TRIGL SERPL-MCNC: 122 MG/DL (ref 30–150)

## 2021-10-19 PROCEDURE — 1101F PR PT FALLS ASSESS DOC 0-1 FALLS W/OUT INJ PAST YR: ICD-10-PCS | Mod: HCNC,CPTII,S$GLB, | Performed by: REGISTERED NURSE

## 2021-10-19 PROCEDURE — 3288F FALL RISK ASSESSMENT DOCD: CPT | Mod: HCNC,CPTII,S$GLB, | Performed by: REGISTERED NURSE

## 2021-10-19 PROCEDURE — 99214 OFFICE O/P EST MOD 30 MIN: CPT | Mod: HCNC,S$GLB,, | Performed by: REGISTERED NURSE

## 2021-10-19 PROCEDURE — 83036 HEMOGLOBIN GLYCOSYLATED A1C: CPT | Mod: HCNC,TXP | Performed by: REGISTERED NURSE

## 2021-10-19 PROCEDURE — 80061 LIPID PANEL: CPT | Mod: HCNC,TXP | Performed by: REGISTERED NURSE

## 2021-10-19 PROCEDURE — 99499 RISK ADDL DX/OHS AUDIT: ICD-10-PCS | Mod: HCNC,S$GLB,, | Performed by: REGISTERED NURSE

## 2021-10-19 PROCEDURE — 1126F AMNT PAIN NOTED NONE PRSNT: CPT | Mod: HCNC,CPTII,S$GLB, | Performed by: REGISTERED NURSE

## 2021-10-19 PROCEDURE — 3077F SYST BP >= 140 MM HG: CPT | Mod: HCNC,CPTII,S$GLB, | Performed by: REGISTERED NURSE

## 2021-10-19 PROCEDURE — 3078F DIAST BP <80 MM HG: CPT | Mod: HCNC,CPTII,S$GLB, | Performed by: REGISTERED NURSE

## 2021-10-19 PROCEDURE — 36415 COLL VENOUS BLD VENIPUNCTURE: CPT | Mod: HCNC,PO,TXP | Performed by: REGISTERED NURSE

## 2021-10-19 PROCEDURE — 4010F PR ACE/ARB THEARPY RXD/TAKEN: ICD-10-PCS | Mod: HCNC,CPTII,S$GLB, | Performed by: REGISTERED NURSE

## 2021-10-19 PROCEDURE — 99499 UNLISTED E&M SERVICE: CPT | Mod: HCNC,S$GLB,, | Performed by: REGISTERED NURSE

## 2021-10-19 PROCEDURE — 3078F PR MOST RECENT DIASTOLIC BLOOD PRESSURE < 80 MM HG: ICD-10-PCS | Mod: HCNC,CPTII,S$GLB, | Performed by: REGISTERED NURSE

## 2021-10-19 PROCEDURE — 3051F PR MOST RECENT HEMOGLOBIN A1C LEVEL 7.0 - < 8.0%: ICD-10-PCS | Mod: HCNC,CPTII,S$GLB, | Performed by: REGISTERED NURSE

## 2021-10-19 PROCEDURE — 99214 PR OFFICE/OUTPT VISIT, EST, LEVL IV, 30-39 MIN: ICD-10-PCS | Mod: HCNC,S$GLB,, | Performed by: REGISTERED NURSE

## 2021-10-19 PROCEDURE — 3008F PR BODY MASS INDEX (BMI) DOCUMENTED: ICD-10-PCS | Mod: HCNC,CPTII,S$GLB, | Performed by: REGISTERED NURSE

## 2021-10-19 PROCEDURE — 3288F PR FALLS RISK ASSESSMENT DOCUMENTED: ICD-10-PCS | Mod: HCNC,CPTII,S$GLB, | Performed by: REGISTERED NURSE

## 2021-10-19 PROCEDURE — 4010F ACE/ARB THERAPY RXD/TAKEN: CPT | Mod: HCNC,CPTII,S$GLB, | Performed by: REGISTERED NURSE

## 2021-10-19 PROCEDURE — 1101F PT FALLS ASSESS-DOCD LE1/YR: CPT | Mod: HCNC,CPTII,S$GLB, | Performed by: REGISTERED NURSE

## 2021-10-19 PROCEDURE — 99999 PR PBB SHADOW E&M-EST. PATIENT-LVL V: ICD-10-PCS | Mod: PBBFAC,HCNC,, | Performed by: REGISTERED NURSE

## 2021-10-19 PROCEDURE — 1159F PR MEDICATION LIST DOCUMENTED IN MEDICAL RECORD: ICD-10-PCS | Mod: HCNC,CPTII,S$GLB, | Performed by: REGISTERED NURSE

## 2021-10-19 PROCEDURE — 99999 PR PBB SHADOW E&M-EST. PATIENT-LVL V: CPT | Mod: PBBFAC,HCNC,, | Performed by: REGISTERED NURSE

## 2021-10-19 PROCEDURE — 3077F PR MOST RECENT SYSTOLIC BLOOD PRESSURE >= 140 MM HG: ICD-10-PCS | Mod: HCNC,CPTII,S$GLB, | Performed by: REGISTERED NURSE

## 2021-10-19 PROCEDURE — 3051F HG A1C>EQUAL 7.0%<8.0%: CPT | Mod: HCNC,CPTII,S$GLB, | Performed by: REGISTERED NURSE

## 2021-10-19 PROCEDURE — 1159F MED LIST DOCD IN RCRD: CPT | Mod: HCNC,CPTII,S$GLB, | Performed by: REGISTERED NURSE

## 2021-10-19 PROCEDURE — 3008F BODY MASS INDEX DOCD: CPT | Mod: HCNC,CPTII,S$GLB, | Performed by: REGISTERED NURSE

## 2021-10-19 PROCEDURE — 1126F PR PAIN SEVERITY QUANTIFIED, NO PAIN PRESENT: ICD-10-PCS | Mod: HCNC,CPTII,S$GLB, | Performed by: REGISTERED NURSE

## 2021-10-26 ENCOUNTER — TELEPHONE (OUTPATIENT)
Dept: TRANSPLANT | Facility: CLINIC | Age: 76
End: 2021-10-26
Payer: MEDICARE

## 2021-11-01 ENCOUNTER — TELEPHONE (OUTPATIENT)
Dept: FAMILY MEDICINE | Facility: CLINIC | Age: 76
End: 2021-11-01
Payer: MEDICARE

## 2021-11-10 ENCOUNTER — PATIENT OUTREACH (OUTPATIENT)
Dept: ADMINISTRATIVE | Facility: HOSPITAL | Age: 76
End: 2021-11-10
Payer: MEDICARE

## 2021-11-23 ENCOUNTER — PATIENT OUTREACH (OUTPATIENT)
Dept: ADMINISTRATIVE | Facility: HOSPITAL | Age: 76
End: 2021-11-23
Payer: MEDICARE

## 2021-12-06 ENCOUNTER — HOSPITAL ENCOUNTER (OUTPATIENT)
Facility: HOSPITAL | Age: 76
Discharge: HOME | End: 2021-12-07
Attending: EMERGENCY MEDICINE | Admitting: INTERNAL MEDICINE
Payer: MEDICARE

## 2021-12-06 DIAGNOSIS — R79.89 ELEVATED TROPONIN: ICD-10-CM

## 2021-12-06 DIAGNOSIS — Z99.2 ESRD ON DIALYSIS: ICD-10-CM

## 2021-12-06 DIAGNOSIS — R06.02 SHORTNESS OF BREATH: Primary | ICD-10-CM

## 2021-12-06 DIAGNOSIS — I50.9 CHF EXACERBATION: ICD-10-CM

## 2021-12-06 DIAGNOSIS — J81.0 ACUTE PULMONARY EDEMA: ICD-10-CM

## 2021-12-06 DIAGNOSIS — N18.6 ESRD ON DIALYSIS: ICD-10-CM

## 2021-12-06 DIAGNOSIS — I50.9 CONGESTIVE HEART FAILURE, UNSPECIFIED HF CHRONICITY, UNSPECIFIED HEART FAILURE TYPE: ICD-10-CM

## 2021-12-06 DIAGNOSIS — R07.9 CHEST PAIN: ICD-10-CM

## 2021-12-06 LAB
ALBUMIN SERPL BCP-MCNC: 3.2 G/DL (ref 3.5–5.2)
ALP SERPL-CCNC: 84 U/L (ref 55–135)
ALT SERPL W/O P-5'-P-CCNC: 12 U/L (ref 10–44)
ANION GAP SERPL CALC-SCNC: 16 MMOL/L (ref 8–16)
AORTIC ROOT ANNULUS: 2.8 CM
ASCENDING AORTA: 2.65 CM
AST SERPL-CCNC: 16 U/L (ref 10–40)
AV INDEX (PROSTH): 0.46
AV MEAN GRADIENT: 5 MMHG
AV PEAK GRADIENT: 10 MMHG
AV VALVE AREA: 1.12 CM2
AV VELOCITY RATIO: 0.48
BASOPHILS # BLD AUTO: 0.01 K/UL (ref 0–0.2)
BASOPHILS NFR BLD: 0.1 % (ref 0–1.9)
BILIRUB SERPL-MCNC: 0.1 MG/DL (ref 0.1–1)
BNP SERPL-MCNC: 1555 PG/ML (ref 0–99)
BSA FOR ECHO PROCEDURE: 1.77 M2
BUN SERPL-MCNC: 57 MG/DL (ref 8–23)
CALCIUM SERPL-MCNC: 9.1 MG/DL (ref 8.7–10.5)
CHLORIDE SERPL-SCNC: 107 MMOL/L (ref 95–110)
CO2 SERPL-SCNC: 17 MMOL/L (ref 23–29)
CREAT SERPL-MCNC: 7.1 MG/DL (ref 0.5–1.4)
CTP QC/QA: YES
CTP QC/QA: YES
CV ECHO LV RWT: 0.41 CM
DIFFERENTIAL METHOD: ABNORMAL
DOP CALC AO PEAK VEL: 1.61 M/S
DOP CALC AO VTI: 35 CM
DOP CALC LVOT AREA: 2.4 CM2
DOP CALC LVOT DIAMETER: 1.76 CM
DOP CALC LVOT PEAK VEL: 0.77 M/S
DOP CALC LVOT STROKE VOLUME: 39.15 CM3
DOP CALC RVOT AREA: 4.34 CM2
DOP CALC RVOT DIAMETER: 2.35 CM
DOP CALCLVOT PEAK VEL VTI: 16.1 CM
E WAVE DECELERATION TIME: 243.23 MSEC
E/A RATIO: 1.56
E/E' RATIO: 19.69 M/S
ECHO EF ESTIMATED: 63 %
ECHO LV POSTERIOR WALL: 0.98 CM (ref 0.6–1.1)
EJECTION FRACTION: 60 %
EOSINOPHIL # BLD AUTO: 0.1 K/UL (ref 0–0.5)
EOSINOPHIL NFR BLD: 1.6 % (ref 0–8)
ERYTHROCYTE [DISTWIDTH] IN BLOOD BY AUTOMATED COUNT: 14.2 % (ref 11.5–14.5)
EST. GFR  (AFRICAN AMERICAN): 6 ML/MIN/1.73 M^2
EST. GFR  (NON AFRICAN AMERICAN): 5 ML/MIN/1.73 M^2
FRACTIONAL SHORTENING: 34 % (ref 28–44)
GLUCOSE SERPL-MCNC: 373 MG/DL (ref 70–110)
HCT VFR BLD AUTO: 25.7 % (ref 37–48.5)
HGB BLD-MCNC: 8 G/DL (ref 12–16)
IMM GRANULOCYTES # BLD AUTO: 0.02 K/UL (ref 0–0.04)
IMM GRANULOCYTES NFR BLD AUTO: 0.3 % (ref 0–0.5)
INTERVENTRICULAR SEPTUM: 0.89 CM (ref 0.6–1.1)
IVC DIAMETER: 1.96 CM
LA MAJOR: 4.72 CM
LA MINOR: 4.13 CM
LA WIDTH: 3.78 CM
LEFT ATRIUM SIZE: 3.54 CM
LEFT ATRIUM VOLUME INDEX MOD: 23.8 ML/M2
LEFT ATRIUM VOLUME INDEX: 28.6 ML/M2
LEFT ATRIUM VOLUME MOD: 41.58 CM3
LEFT ATRIUM VOLUME: 50.11 CM3
LEFT INTERNAL DIMENSION IN SYSTOLE: 3.15 CM (ref 2.1–4)
LEFT VENTRICLE DIASTOLIC VOLUME INDEX: 60.09 ML/M2
LEFT VENTRICLE DIASTOLIC VOLUME: 105.16 ML
LEFT VENTRICLE MASS INDEX: 87 G/M2
LEFT VENTRICLE SYSTOLIC VOLUME INDEX: 22.5 ML/M2
LEFT VENTRICLE SYSTOLIC VOLUME: 39.3 ML
LEFT VENTRICULAR INTERNAL DIMENSION IN DIASTOLE: 4.75 CM (ref 3.5–6)
LEFT VENTRICULAR MASS: 152.81 G
LV LATERAL E/E' RATIO: 16 M/S
LV SEPTAL E/E' RATIO: 25.6 M/S
LVOT MG: 1.04 MMHG
LVOT MV: 0.48 CM/S
LYMPHOCYTES # BLD AUTO: 0.6 K/UL (ref 1–4.8)
LYMPHOCYTES NFR BLD: 9.6 % (ref 18–48)
MCH RBC QN AUTO: 32.4 PG (ref 27–31)
MCHC RBC AUTO-ENTMCNC: 31.1 G/DL (ref 32–36)
MCV RBC AUTO: 104 FL (ref 82–98)
MONOCYTES # BLD AUTO: 0.3 K/UL (ref 0.3–1)
MONOCYTES NFR BLD: 4.5 % (ref 4–15)
MV PEAK A VEL: 0.82 M/S
MV PEAK E VEL: 1.28 M/S
MV STENOSIS PRESSURE HALF TIME: 70.54 MS
MV VALVE AREA P 1/2 METHOD: 3.12 CM2
NEUTROPHILS # BLD AUTO: 5.6 K/UL (ref 1.8–7.7)
NEUTROPHILS NFR BLD: 83.9 % (ref 38–73)
NRBC BLD-RTO: 0 /100 WBC
PISA MRMAX VEL: 6.17 M/S
PISA TR MAX VEL: 4.1 M/S
PLATELET # BLD AUTO: 192 K/UL (ref 150–450)
PMV BLD AUTO: 9.9 FL (ref 9.2–12.9)
POC MOLECULAR INFLUENZA A AGN: NEGATIVE
POC MOLECULAR INFLUENZA B AGN: NEGATIVE
POCT GLUCOSE: 242 MG/DL (ref 70–110)
POTASSIUM SERPL-SCNC: 4.7 MMOL/L (ref 3.5–5.1)
PROT SERPL-MCNC: 6.2 G/DL (ref 6–8.4)
PULM VEIN S/D RATIO: 0.65
PV MV: 0.8 M/S
PV PEAK D VEL: 1.07 M/S
PV PEAK S VEL: 0.69 M/S
PV PEAK VELOCITY: 1.08 CM/S
RA MAJOR: 4.93 CM
RA PRESSURE: 8 MMHG
RA WIDTH: 3.33 CM
RBC # BLD AUTO: 2.47 M/UL (ref 4–5.4)
SARS-COV-2 RDRP RESP QL NAA+PROBE: NEGATIVE
SINUS: 3.02 CM
SODIUM SERPL-SCNC: 140 MMOL/L (ref 136–145)
STJ: 1.95 CM
TDI LATERAL: 0.08 M/S
TDI SEPTAL: 0.05 M/S
TDI: 0.07 M/S
TR MAX PG: 67 MMHG
TRICUSPID ANNULAR PLANE SYSTOLIC EXCURSION: 1.83 CM
TROPONIN I SERPL DL<=0.01 NG/ML-MCNC: 0.39 NG/ML (ref 0–0.03)
TV REST PULMONARY ARTERY PRESSURE: 75 MMHG
WBC # BLD AUTO: 6.67 K/UL (ref 3.9–12.7)

## 2021-12-06 PROCEDURE — 84484 ASSAY OF TROPONIN QUANT: CPT | Mod: HCNC,NTX | Performed by: PHYSICIAN ASSISTANT

## 2021-12-06 PROCEDURE — 99213 PR OFFICE/OUTPT VISIT, EST, LEVL III, 20-29 MIN: ICD-10-PCS | Mod: HCNC,25,NTX, | Performed by: INTERNAL MEDICINE

## 2021-12-06 PROCEDURE — 63600175 PHARM REV CODE 636 W HCPCS: Mod: HCNC,NTX | Performed by: STUDENT IN AN ORGANIZED HEALTH CARE EDUCATION/TRAINING PROGRAM

## 2021-12-06 PROCEDURE — G0378 HOSPITAL OBSERVATION PER HR: HCPCS | Mod: HCNC,NTX

## 2021-12-06 PROCEDURE — 25000003 PHARM REV CODE 250: Mod: HCNC,NTX | Performed by: STUDENT IN AN ORGANIZED HEALTH CARE EDUCATION/TRAINING PROGRAM

## 2021-12-06 PROCEDURE — 80053 COMPREHEN METABOLIC PANEL: CPT | Mod: HCNC,NTX | Performed by: PHYSICIAN ASSISTANT

## 2021-12-06 PROCEDURE — 85025 COMPLETE CBC W/AUTO DIFF WBC: CPT | Mod: HCNC,NTX | Performed by: PHYSICIAN ASSISTANT

## 2021-12-06 PROCEDURE — 93010 EKG 12-LEAD: ICD-10-PCS | Mod: HCNC,NTX,, | Performed by: INTERNAL MEDICINE

## 2021-12-06 PROCEDURE — 99291 CRITICAL CARE FIRST HOUR: CPT | Mod: 25,HCNC,NTX

## 2021-12-06 PROCEDURE — U0002 COVID-19 LAB TEST NON-CDC: HCPCS | Mod: HCNC,NTX | Performed by: PHYSICIAN ASSISTANT

## 2021-12-06 PROCEDURE — 93005 ELECTROCARDIOGRAM TRACING: CPT | Mod: HCNC,NTX

## 2021-12-06 PROCEDURE — G0257 UNSCHED DIALYSIS ESRD PT HOS: HCPCS | Mod: HCNC,NTX

## 2021-12-06 PROCEDURE — 99214 OFFICE O/P EST MOD 30 MIN: CPT | Mod: HCNC,NTX,, | Performed by: INTERNAL MEDICINE

## 2021-12-06 PROCEDURE — 83880 ASSAY OF NATRIURETIC PEPTIDE: CPT | Mod: HCNC,NTX | Performed by: PHYSICIAN ASSISTANT

## 2021-12-06 PROCEDURE — 99213 OFFICE O/P EST LOW 20 MIN: CPT | Mod: HCNC,25,NTX, | Performed by: INTERNAL MEDICINE

## 2021-12-06 PROCEDURE — 25000003 PHARM REV CODE 250: Mod: HCNC,NTX | Performed by: EMERGENCY MEDICINE

## 2021-12-06 PROCEDURE — 96372 THER/PROPH/DIAG INJ SC/IM: CPT | Mod: NTX,59

## 2021-12-06 PROCEDURE — 93010 ELECTROCARDIOGRAM REPORT: CPT | Mod: HCNC,NTX,, | Performed by: INTERNAL MEDICINE

## 2021-12-06 PROCEDURE — 99214 PR OFFICE/OUTPT VISIT, EST, LEVL IV, 30-39 MIN: ICD-10-PCS | Mod: HCNC,NTX,, | Performed by: INTERNAL MEDICINE

## 2021-12-06 PROCEDURE — 80100014 HC HEMODIALYSIS 1:1: Mod: HCNC,NTX

## 2021-12-06 PROCEDURE — C9399 UNCLASSIFIED DRUGS OR BIOLOG: HCPCS | Mod: HCNC,NTX | Performed by: STUDENT IN AN ORGANIZED HEALTH CARE EDUCATION/TRAINING PROGRAM

## 2021-12-06 RX ORDER — LANOLIN ALCOHOL/MO/W.PET/CERES
100 CREAM (GRAM) TOPICAL DAILY
Status: ON HOLD | COMMUNITY
End: 2024-03-16

## 2021-12-06 RX ORDER — FOLIC ACID 1 MG/1
1 TABLET ORAL DAILY
Status: ON HOLD | COMMUNITY
End: 2024-03-16

## 2021-12-06 RX ORDER — ATORVASTATIN CALCIUM 40 MG/1
80 TABLET, FILM COATED ORAL DAILY
Status: DISCONTINUED | OUTPATIENT
Start: 2021-12-06 | End: 2021-12-08 | Stop reason: HOSPADM

## 2021-12-06 RX ORDER — CLOPIDOGREL BISULFATE 75 MG/1
75 TABLET ORAL DAILY
Status: DISCONTINUED | OUTPATIENT
Start: 2021-12-06 | End: 2021-12-08 | Stop reason: HOSPADM

## 2021-12-06 RX ORDER — CALCITRIOL 0.25 UG/1
0.25 CAPSULE ORAL DAILY
Status: DISCONTINUED | OUTPATIENT
Start: 2021-12-06 | End: 2021-12-08 | Stop reason: HOSPADM

## 2021-12-06 RX ORDER — METOPROLOL TARTRATE 50 MG/1
50 TABLET ORAL 2 TIMES DAILY
Status: DISCONTINUED | OUTPATIENT
Start: 2021-12-06 | End: 2021-12-08 | Stop reason: HOSPADM

## 2021-12-06 RX ORDER — IBUPROFEN 200 MG
16 TABLET ORAL
Status: DISCONTINUED | OUTPATIENT
Start: 2021-12-06 | End: 2021-12-08 | Stop reason: HOSPADM

## 2021-12-06 RX ORDER — ASPIRIN 81 MG/1
81 TABLET ORAL DAILY
Status: DISCONTINUED | OUTPATIENT
Start: 2021-12-06 | End: 2021-12-08 | Stop reason: HOSPADM

## 2021-12-06 RX ORDER — ENOXAPARIN SODIUM 100 MG/ML
30 INJECTION SUBCUTANEOUS EVERY 24 HOURS
Status: DISCONTINUED | OUTPATIENT
Start: 2021-12-06 | End: 2021-12-06

## 2021-12-06 RX ORDER — GLUCAGON 1 MG
1 KIT INJECTION
Status: DISCONTINUED | OUTPATIENT
Start: 2021-12-06 | End: 2021-12-08 | Stop reason: HOSPADM

## 2021-12-06 RX ORDER — SEVELAMER CARBONATE 800 MG/1
800 TABLET, FILM COATED ORAL
Status: DISCONTINUED | OUTPATIENT
Start: 2021-12-06 | End: 2021-12-08 | Stop reason: HOSPADM

## 2021-12-06 RX ORDER — LISINOPRIL 20 MG/1
20 TABLET ORAL DAILY
Status: DISCONTINUED | OUTPATIENT
Start: 2021-12-06 | End: 2021-12-08 | Stop reason: HOSPADM

## 2021-12-06 RX ORDER — NALOXONE HCL 0.4 MG/ML
0.02 VIAL (ML) INJECTION
Status: DISCONTINUED | OUTPATIENT
Start: 2021-12-06 | End: 2021-12-08 | Stop reason: HOSPADM

## 2021-12-06 RX ORDER — SODIUM CHLORIDE 0.9 % (FLUSH) 0.9 %
10 SYRINGE (ML) INJECTION EVERY 12 HOURS PRN
Status: DISCONTINUED | OUTPATIENT
Start: 2021-12-06 | End: 2021-12-08 | Stop reason: HOSPADM

## 2021-12-06 RX ORDER — INSULIN ASPART 100 [IU]/ML
0-5 INJECTION, SOLUTION INTRAVENOUS; SUBCUTANEOUS
Status: DISCONTINUED | OUTPATIENT
Start: 2021-12-06 | End: 2021-12-08 | Stop reason: HOSPADM

## 2021-12-06 RX ORDER — ONDANSETRON 2 MG/ML
4 INJECTION INTRAMUSCULAR; INTRAVENOUS EVERY 8 HOURS PRN
Status: DISCONTINUED | OUTPATIENT
Start: 2021-12-06 | End: 2021-12-08 | Stop reason: HOSPADM

## 2021-12-06 RX ORDER — SODIUM BICARBONATE 325 MG/1
325 TABLET ORAL 3 TIMES DAILY
Status: DISCONTINUED | OUTPATIENT
Start: 2021-12-06 | End: 2021-12-08 | Stop reason: HOSPADM

## 2021-12-06 RX ORDER — NITROGLYCERIN 0.4 MG/1
0.4 TABLET SUBLINGUAL EVERY 5 MIN PRN
Status: DISCONTINUED | OUTPATIENT
Start: 2021-12-06 | End: 2021-12-08 | Stop reason: HOSPADM

## 2021-12-06 RX ORDER — ATROPINE SULFATE 10 MG/ML
1 SOLUTION/ DROPS OPHTHALMIC 3 TIMES DAILY
Status: DISCONTINUED | OUTPATIENT
Start: 2021-12-06 | End: 2021-12-06

## 2021-12-06 RX ORDER — HYDROXYZINE HYDROCHLORIDE 10 MG/1
10 TABLET, FILM COATED ORAL 2 TIMES DAILY PRN
Status: DISCONTINUED | OUTPATIENT
Start: 2021-12-06 | End: 2021-12-08 | Stop reason: HOSPADM

## 2021-12-06 RX ORDER — IBUPROFEN 200 MG
24 TABLET ORAL
Status: DISCONTINUED | OUTPATIENT
Start: 2021-12-06 | End: 2021-12-08 | Stop reason: HOSPADM

## 2021-12-06 RX ADMIN — SODIUM BICARBONATE 325 MG: 325 TABLET ORAL at 03:12

## 2021-12-06 RX ADMIN — INSULIN ASPART 1 UNITS: 100 INJECTION, SOLUTION INTRAVENOUS; SUBCUTANEOUS at 09:12

## 2021-12-06 RX ADMIN — ISOSORBIDE DINITRATE: 20 TABLET ORAL at 09:12

## 2021-12-06 RX ADMIN — ISOSORBIDE DINITRATE: 20 TABLET ORAL at 03:12

## 2021-12-06 RX ADMIN — SODIUM BICARBONATE 325 MG: 325 TABLET ORAL at 09:12

## 2021-12-06 RX ADMIN — METOPROLOL TARTRATE 50 MG: 50 TABLET, FILM COATED ORAL at 09:12

## 2021-12-06 RX ADMIN — INSULIN DETEMIR 15 UNITS: 100 INJECTION, SOLUTION SUBCUTANEOUS at 09:12

## 2021-12-06 RX ADMIN — NITROGLYCERIN 1 INCH: 20 OINTMENT TOPICAL at 12:12

## 2021-12-07 VITALS
BODY MASS INDEX: 24.02 KG/M2 | WEIGHT: 144.19 LBS | HEART RATE: 66 BPM | DIASTOLIC BLOOD PRESSURE: 64 MMHG | OXYGEN SATURATION: 95 % | HEIGHT: 65 IN | TEMPERATURE: 99 F | SYSTOLIC BLOOD PRESSURE: 142 MMHG | RESPIRATION RATE: 18 BRPM

## 2021-12-07 LAB
ANION GAP SERPL CALC-SCNC: 13 MMOL/L (ref 8–16)
BUN SERPL-MCNC: 52 MG/DL (ref 8–23)
CALCIUM SERPL-MCNC: 9.1 MG/DL (ref 8.7–10.5)
CHLORIDE SERPL-SCNC: 104 MMOL/L (ref 95–110)
CO2 SERPL-SCNC: 21 MMOL/L (ref 23–29)
CREAT SERPL-MCNC: 6.2 MG/DL (ref 0.5–1.4)
ERYTHROCYTE [DISTWIDTH] IN BLOOD BY AUTOMATED COUNT: 14.3 % (ref 11.5–14.5)
EST. GFR  (AFRICAN AMERICAN): 7 ML/MIN/1.73 M^2
EST. GFR  (NON AFRICAN AMERICAN): 6 ML/MIN/1.73 M^2
GLUCOSE SERPL-MCNC: 132 MG/DL (ref 70–110)
HCT VFR BLD AUTO: 27 % (ref 37–48.5)
HGB BLD-MCNC: 8.3 G/DL (ref 12–16)
MCH RBC QN AUTO: 32.4 PG (ref 27–31)
MCHC RBC AUTO-ENTMCNC: 30.7 G/DL (ref 32–36)
MCV RBC AUTO: 106 FL (ref 82–98)
PLATELET # BLD AUTO: 188 K/UL (ref 150–450)
PMV BLD AUTO: 10 FL (ref 9.2–12.9)
POCT GLUCOSE: 153 MG/DL (ref 70–110)
POCT GLUCOSE: 202 MG/DL (ref 70–110)
POCT GLUCOSE: 302 MG/DL (ref 70–110)
POTASSIUM SERPL-SCNC: 4.1 MMOL/L (ref 3.5–5.1)
RBC # BLD AUTO: 2.56 M/UL (ref 4–5.4)
SODIUM SERPL-SCNC: 138 MMOL/L (ref 136–145)
TROPONIN I SERPL DL<=0.01 NG/ML-MCNC: 0.55 NG/ML (ref 0–0.03)
TROPONIN I SERPL DL<=0.01 NG/ML-MCNC: 0.8 NG/ML (ref 0–0.03)
WBC # BLD AUTO: 5.92 K/UL (ref 3.9–12.7)

## 2021-12-07 PROCEDURE — G0378 HOSPITAL OBSERVATION PER HR: HCPCS | Mod: HCNC,NTX

## 2021-12-07 PROCEDURE — 25000003 PHARM REV CODE 250: Mod: HCNC,NTX | Performed by: STUDENT IN AN ORGANIZED HEALTH CARE EDUCATION/TRAINING PROGRAM

## 2021-12-07 PROCEDURE — 25000003 PHARM REV CODE 250: Mod: HCNC,NTX | Performed by: NURSE PRACTITIONER

## 2021-12-07 PROCEDURE — 96372 THER/PROPH/DIAG INJ SC/IM: CPT

## 2021-12-07 PROCEDURE — 84484 ASSAY OF TROPONIN QUANT: CPT | Mod: 91,HCNC,NTX | Performed by: NURSE PRACTITIONER

## 2021-12-07 PROCEDURE — 99214 PR OFFICE/OUTPT VISIT, EST, LEVL IV, 30-39 MIN: ICD-10-PCS | Mod: HCNC,NTX,, | Performed by: INTERNAL MEDICINE

## 2021-12-07 PROCEDURE — 84484 ASSAY OF TROPONIN QUANT: CPT | Mod: HCNC,NTX | Performed by: STUDENT IN AN ORGANIZED HEALTH CARE EDUCATION/TRAINING PROGRAM

## 2021-12-07 PROCEDURE — 85027 COMPLETE CBC AUTOMATED: CPT | Mod: HCNC,NTX | Performed by: STUDENT IN AN ORGANIZED HEALTH CARE EDUCATION/TRAINING PROGRAM

## 2021-12-07 PROCEDURE — 99214 OFFICE O/P EST MOD 30 MIN: CPT | Mod: HCNC,NTX,, | Performed by: INTERNAL MEDICINE

## 2021-12-07 PROCEDURE — 99220 PR INITIAL OBSERVATION CARE,LEVL III: ICD-10-PCS | Mod: HCNC,NTX,, | Performed by: INTERNAL MEDICINE

## 2021-12-07 PROCEDURE — 36415 COLL VENOUS BLD VENIPUNCTURE: CPT | Mod: HCNC,NTX | Performed by: NURSE PRACTITIONER

## 2021-12-07 PROCEDURE — 36415 COLL VENOUS BLD VENIPUNCTURE: CPT | Mod: HCNC,NTX | Performed by: STUDENT IN AN ORGANIZED HEALTH CARE EDUCATION/TRAINING PROGRAM

## 2021-12-07 PROCEDURE — 80048 BASIC METABOLIC PNL TOTAL CA: CPT | Mod: HCNC,NTX | Performed by: STUDENT IN AN ORGANIZED HEALTH CARE EDUCATION/TRAINING PROGRAM

## 2021-12-07 PROCEDURE — 99220 PR INITIAL OBSERVATION CARE,LEVL III: CPT | Mod: HCNC,NTX,, | Performed by: INTERNAL MEDICINE

## 2021-12-07 RX ORDER — CLOPIDOGREL BISULFATE 75 MG/1
75 TABLET ORAL DAILY
Qty: 60 TABLET | Refills: 0 | Status: SHIPPED | OUTPATIENT
Start: 2021-12-07 | End: 2022-06-30 | Stop reason: SDUPTHER

## 2021-12-07 RX ORDER — ACETAMINOPHEN 325 MG/1
650 TABLET ORAL EVERY 6 HOURS PRN
Status: DISCONTINUED | OUTPATIENT
Start: 2021-12-07 | End: 2021-12-08 | Stop reason: HOSPADM

## 2021-12-07 RX ORDER — ATORVASTATIN CALCIUM 80 MG/1
80 TABLET, FILM COATED ORAL NIGHTLY
Qty: 60 TABLET | Refills: 0 | Status: ON HOLD | OUTPATIENT
Start: 2021-12-07 | End: 2021-12-21 | Stop reason: HOSPADM

## 2021-12-07 RX ORDER — LISINOPRIL 20 MG/1
20 TABLET ORAL DAILY
Qty: 90 TABLET | Refills: 0 | Status: CANCELLED | OUTPATIENT
Start: 2021-12-08 | End: 2022-03-08

## 2021-12-07 RX ADMIN — INSULIN ASPART 2 UNITS: 100 INJECTION, SOLUTION INTRAVENOUS; SUBCUTANEOUS at 11:12

## 2021-12-07 RX ADMIN — ATORVASTATIN CALCIUM 80 MG: 40 TABLET, FILM COATED ORAL at 08:12

## 2021-12-07 RX ADMIN — SEVELAMER CARBONATE 800 MG: 800 TABLET, FILM COATED ORAL at 11:12

## 2021-12-07 RX ADMIN — ASPIRIN 81 MG: 81 TABLET, COATED ORAL at 08:12

## 2021-12-07 RX ADMIN — ISOSORBIDE DINITRATE: 20 TABLET ORAL at 08:12

## 2021-12-07 RX ADMIN — METOPROLOL TARTRATE 50 MG: 50 TABLET, FILM COATED ORAL at 08:12

## 2021-12-07 RX ADMIN — SODIUM BICARBONATE 325 MG: 325 TABLET ORAL at 08:12

## 2021-12-07 RX ADMIN — CLOPIDOGREL 75 MG: 75 TABLET, FILM COATED ORAL at 08:12

## 2021-12-07 RX ADMIN — ISOSORBIDE DINITRATE: 20 TABLET ORAL at 03:12

## 2021-12-07 RX ADMIN — CALCITRIOL CAPSULES 0.25 MCG 0.25 MCG: 0.25 CAPSULE ORAL at 08:12

## 2021-12-07 RX ADMIN — INSULIN ASPART 2 UNITS: 100 INJECTION, SOLUTION INTRAVENOUS; SUBCUTANEOUS at 08:12

## 2021-12-07 RX ADMIN — ACETAMINOPHEN 650 MG: 325 TABLET ORAL at 04:12

## 2021-12-07 RX ADMIN — SODIUM BICARBONATE 325 MG: 325 TABLET ORAL at 03:12

## 2021-12-07 RX ADMIN — SEVELAMER CARBONATE 800 MG: 800 TABLET, FILM COATED ORAL at 08:12

## 2021-12-07 RX ADMIN — LISINOPRIL 20 MG: 20 TABLET ORAL at 08:12

## 2021-12-07 RX ADMIN — SEVELAMER CARBONATE 800 MG: 800 TABLET, FILM COATED ORAL at 05:12

## 2021-12-07 RX ADMIN — INSULIN DETEMIR 15 UNITS: 100 INJECTION, SOLUTION SUBCUTANEOUS at 08:12

## 2021-12-10 ENCOUNTER — PATIENT OUTREACH (OUTPATIENT)
Dept: ADMINISTRATIVE | Facility: HOSPITAL | Age: 76
End: 2021-12-10
Payer: MEDICARE

## 2021-12-13 ENCOUNTER — PATIENT OUTREACH (OUTPATIENT)
Dept: ADMINISTRATIVE | Facility: OTHER | Age: 76
End: 2021-12-13
Payer: MEDICARE

## 2021-12-13 ENCOUNTER — OFFICE VISIT (OUTPATIENT)
Dept: OPHTHALMOLOGY | Facility: CLINIC | Age: 76
End: 2021-12-13
Payer: MEDICARE

## 2021-12-13 DIAGNOSIS — E11.3312 TYPE 2 DIABETES MELLITUS WITH LEFT EYE AFFECTED BY MODERATE NONPROLIFERATIVE RETINOPATHY AND MACULAR EDEMA, WITH LONG-TERM CURRENT USE OF INSULIN: ICD-10-CM

## 2021-12-13 DIAGNOSIS — S05.02XA ABRASION OF LEFT CORNEA, INITIAL ENCOUNTER: Primary | ICD-10-CM

## 2021-12-13 DIAGNOSIS — Z12.11 ENCOUNTER FOR FIT (FECAL IMMUNOCHEMICAL TEST) SCREENING: Primary | ICD-10-CM

## 2021-12-13 DIAGNOSIS — Z79.4 TYPE 2 DIABETES MELLITUS WITH LEFT EYE AFFECTED BY MODERATE NONPROLIFERATIVE RETINOPATHY AND MACULAR EDEMA, WITH LONG-TERM CURRENT USE OF INSULIN: ICD-10-CM

## 2021-12-13 PROCEDURE — 99999 PR PBB SHADOW E&M-EST. PATIENT-LVL III: CPT | Mod: PBBFAC,HCNC,TXP, | Performed by: OPTOMETRIST

## 2021-12-13 PROCEDURE — 92002 PR EYE EXAM, NEW PATIENT,INTERMED: ICD-10-PCS | Mod: HCNC,S$GLB,TXP, | Performed by: OPTOMETRIST

## 2021-12-13 PROCEDURE — 92002 INTRM OPH EXAM NEW PATIENT: CPT | Mod: HCNC,S$GLB,TXP, | Performed by: OPTOMETRIST

## 2021-12-13 PROCEDURE — 99999 PR PBB SHADOW E&M-EST. PATIENT-LVL III: ICD-10-PCS | Mod: PBBFAC,HCNC,TXP, | Performed by: OPTOMETRIST

## 2021-12-13 RX ORDER — MOXIFLOXACIN 5 MG/ML
1 SOLUTION/ DROPS OPHTHALMIC 3 TIMES DAILY
Qty: 3 ML | Refills: 0 | Status: SHIPPED | OUTPATIENT
Start: 2021-12-13 | End: 2021-12-13

## 2021-12-13 RX ORDER — MOXIFLOXACIN 5 MG/ML
1 SOLUTION/ DROPS OPHTHALMIC 3 TIMES DAILY
Qty: 3 ML | Refills: 0 | Status: SHIPPED | OUTPATIENT
Start: 2021-12-13 | End: 2021-12-28

## 2021-12-14 ENCOUNTER — OFFICE VISIT (OUTPATIENT)
Dept: CARDIOLOGY | Facility: CLINIC | Age: 76
End: 2021-12-14
Payer: MEDICARE

## 2021-12-14 VITALS
RESPIRATION RATE: 16 BRPM | HEIGHT: 65 IN | DIASTOLIC BLOOD PRESSURE: 52 MMHG | OXYGEN SATURATION: 99 % | SYSTOLIC BLOOD PRESSURE: 146 MMHG | HEART RATE: 68 BPM | BODY MASS INDEX: 23.62 KG/M2 | WEIGHT: 141.75 LBS

## 2021-12-14 DIAGNOSIS — N18.6 ESRD ON DIALYSIS: ICD-10-CM

## 2021-12-14 DIAGNOSIS — I34.0 NON-RHEUMATIC MITRAL REGURGITATION: ICD-10-CM

## 2021-12-14 DIAGNOSIS — I27.20 PULMONARY HYPERTENSION: ICD-10-CM

## 2021-12-14 DIAGNOSIS — I65.23 BILATERAL CAROTID ARTERY STENOSIS: ICD-10-CM

## 2021-12-14 DIAGNOSIS — E11.65 TYPE 2 DIABETES MELLITUS WITH HYPERGLYCEMIA, WITH LONG-TERM CURRENT USE OF INSULIN: ICD-10-CM

## 2021-12-14 DIAGNOSIS — I70.0 AORTIC ATHEROSCLEROSIS: ICD-10-CM

## 2021-12-14 DIAGNOSIS — I25.111 CORONARY ARTERY DISEASE INVOLVING NATIVE CORONARY ARTERY OF NATIVE HEART WITH ANGINA PECTORIS WITH DOCUMENTED SPASM: ICD-10-CM

## 2021-12-14 DIAGNOSIS — R07.9 CHEST PAIN, UNSPECIFIED TYPE: ICD-10-CM

## 2021-12-14 DIAGNOSIS — R79.89 ELEVATED TROPONIN: Primary | ICD-10-CM

## 2021-12-14 DIAGNOSIS — Z79.4 TYPE 2 DIABETES MELLITUS WITH HYPERGLYCEMIA, WITH LONG-TERM CURRENT USE OF INSULIN: ICD-10-CM

## 2021-12-14 DIAGNOSIS — Z99.2 ESRD ON DIALYSIS: ICD-10-CM

## 2021-12-14 PROCEDURE — 99499 RISK ADDL DX/OHS AUDIT: ICD-10-PCS | Mod: HCNC,S$GLB,, | Performed by: INTERNAL MEDICINE

## 2021-12-14 PROCEDURE — 99214 OFFICE O/P EST MOD 30 MIN: CPT | Mod: HCNC,NTX,S$GLB, | Performed by: INTERNAL MEDICINE

## 2021-12-14 PROCEDURE — 99499 UNLISTED E&M SERVICE: CPT | Mod: HCNC,S$GLB,, | Performed by: INTERNAL MEDICINE

## 2021-12-14 PROCEDURE — 99999 PR PBB SHADOW E&M-EST. PATIENT-LVL IV: CPT | Mod: PBBFAC,HCNC,TXP, | Performed by: INTERNAL MEDICINE

## 2021-12-14 PROCEDURE — 99214 PR OFFICE/OUTPT VISIT, EST, LEVL IV, 30-39 MIN: ICD-10-PCS | Mod: HCNC,NTX,S$GLB, | Performed by: INTERNAL MEDICINE

## 2021-12-14 PROCEDURE — 99999 PR PBB SHADOW E&M-EST. PATIENT-LVL IV: ICD-10-PCS | Mod: PBBFAC,HCNC,TXP, | Performed by: INTERNAL MEDICINE

## 2021-12-15 ENCOUNTER — PATIENT OUTREACH (OUTPATIENT)
Dept: ADMINISTRATIVE | Facility: HOSPITAL | Age: 76
End: 2021-12-15
Payer: MEDICARE

## 2021-12-15 ENCOUNTER — TELEPHONE (OUTPATIENT)
Dept: CARDIOLOGY | Facility: HOSPITAL | Age: 76
End: 2021-12-15
Payer: MEDICARE

## 2021-12-16 ENCOUNTER — OFFICE VISIT (OUTPATIENT)
Dept: FAMILY MEDICINE | Facility: CLINIC | Age: 76
End: 2021-12-16
Payer: MEDICARE

## 2021-12-16 VITALS
RESPIRATION RATE: 18 BRPM | HEART RATE: 70 BPM | SYSTOLIC BLOOD PRESSURE: 110 MMHG | OXYGEN SATURATION: 97 % | HEIGHT: 65 IN | DIASTOLIC BLOOD PRESSURE: 60 MMHG | WEIGHT: 138.88 LBS | BODY MASS INDEX: 23.14 KG/M2 | TEMPERATURE: 98 F

## 2021-12-16 DIAGNOSIS — I50.31 ACUTE DIASTOLIC CONGESTIVE HEART FAILURE: ICD-10-CM

## 2021-12-16 DIAGNOSIS — Z79.4 TYPE 2 DIABETES MELLITUS WITH CHRONIC KIDNEY DISEASE ON CHRONIC DIALYSIS, WITH LONG-TERM CURRENT USE OF INSULIN: ICD-10-CM

## 2021-12-16 DIAGNOSIS — Z99.2 TYPE 2 DIABETES MELLITUS WITH CHRONIC KIDNEY DISEASE ON CHRONIC DIALYSIS, WITH LONG-TERM CURRENT USE OF INSULIN: ICD-10-CM

## 2021-12-16 DIAGNOSIS — M51.36 DDD (DEGENERATIVE DISC DISEASE), LUMBAR: ICD-10-CM

## 2021-12-16 DIAGNOSIS — E11.22 TYPE 2 DIABETES MELLITUS WITH CHRONIC KIDNEY DISEASE ON CHRONIC DIALYSIS, WITH LONG-TERM CURRENT USE OF INSULIN: ICD-10-CM

## 2021-12-16 DIAGNOSIS — Z09 HOSPITAL DISCHARGE FOLLOW-UP: Primary | ICD-10-CM

## 2021-12-16 DIAGNOSIS — J81.0 ACUTE PULMONARY EDEMA: ICD-10-CM

## 2021-12-16 DIAGNOSIS — N18.6 TYPE 2 DIABETES MELLITUS WITH CHRONIC KIDNEY DISEASE ON CHRONIC DIALYSIS, WITH LONG-TERM CURRENT USE OF INSULIN: ICD-10-CM

## 2021-12-16 PROCEDURE — 99499 UNLISTED E&M SERVICE: CPT | Mod: S$GLB,,, | Performed by: REGISTERED NURSE

## 2021-12-16 PROCEDURE — 3052F HG A1C>EQUAL 8.0%<EQUAL 9.0%: CPT | Mod: HCNC,CPTII,S$GLB, | Performed by: REGISTERED NURSE

## 2021-12-16 PROCEDURE — 3074F SYST BP LT 130 MM HG: CPT | Mod: HCNC,CPTII,S$GLB, | Performed by: REGISTERED NURSE

## 2021-12-16 PROCEDURE — 3288F PR FALLS RISK ASSESSMENT DOCUMENTED: ICD-10-PCS | Mod: HCNC,CPTII,S$GLB, | Performed by: REGISTERED NURSE

## 2021-12-16 PROCEDURE — 1126F AMNT PAIN NOTED NONE PRSNT: CPT | Mod: HCNC,CPTII,S$GLB, | Performed by: REGISTERED NURSE

## 2021-12-16 PROCEDURE — 99214 PR OFFICE/OUTPT VISIT, EST, LEVL IV, 30-39 MIN: ICD-10-PCS | Mod: 25,HCNC,S$GLB, | Performed by: REGISTERED NURSE

## 2021-12-16 PROCEDURE — 3074F PR MOST RECENT SYSTOLIC BLOOD PRESSURE < 130 MM HG: ICD-10-PCS | Mod: HCNC,CPTII,S$GLB, | Performed by: REGISTERED NURSE

## 2021-12-16 PROCEDURE — 3066F NEPHROPATHY DOC TX: CPT | Mod: HCNC,CPTII,S$GLB, | Performed by: REGISTERED NURSE

## 2021-12-16 PROCEDURE — 3288F FALL RISK ASSESSMENT DOCD: CPT | Mod: HCNC,CPTII,S$GLB, | Performed by: REGISTERED NURSE

## 2021-12-16 PROCEDURE — 1159F PR MEDICATION LIST DOCUMENTED IN MEDICAL RECORD: ICD-10-PCS | Mod: HCNC,CPTII,S$GLB, | Performed by: REGISTERED NURSE

## 2021-12-16 PROCEDURE — 96372 PR INJECTION,THERAP/PROPH/DIAG2ST, IM OR SUBCUT: ICD-10-PCS | Mod: HCNC,S$GLB,, | Performed by: REGISTERED NURSE

## 2021-12-16 PROCEDURE — 3066F PR DOCUMENTATION OF TREATMENT FOR NEPHROPATHY: ICD-10-PCS | Mod: HCNC,CPTII,S$GLB, | Performed by: REGISTERED NURSE

## 2021-12-16 PROCEDURE — 1159F MED LIST DOCD IN RCRD: CPT | Mod: HCNC,CPTII,S$GLB, | Performed by: REGISTERED NURSE

## 2021-12-16 PROCEDURE — 3052F PR MOST RECENT HEMOGLOBIN A1C LEVEL 8.0 - < 9.0%: ICD-10-PCS | Mod: HCNC,CPTII,S$GLB, | Performed by: REGISTERED NURSE

## 2021-12-16 PROCEDURE — 99999 PR PBB SHADOW E&M-EST. PATIENT-LVL IV: ICD-10-PCS | Mod: PBBFAC,HCNC,, | Performed by: REGISTERED NURSE

## 2021-12-16 PROCEDURE — 3078F DIAST BP <80 MM HG: CPT | Mod: HCNC,CPTII,S$GLB, | Performed by: REGISTERED NURSE

## 2021-12-16 PROCEDURE — 99499 RISK ADDL DX/OHS AUDIT: ICD-10-PCS | Mod: S$GLB,,, | Performed by: REGISTERED NURSE

## 2021-12-16 PROCEDURE — 99999 PR PBB SHADOW E&M-EST. PATIENT-LVL IV: CPT | Mod: PBBFAC,HCNC,, | Performed by: REGISTERED NURSE

## 2021-12-16 PROCEDURE — 1101F PR PT FALLS ASSESS DOC 0-1 FALLS W/OUT INJ PAST YR: ICD-10-PCS | Mod: HCNC,CPTII,S$GLB, | Performed by: REGISTERED NURSE

## 2021-12-16 PROCEDURE — 3008F BODY MASS INDEX DOCD: CPT | Mod: HCNC,CPTII,S$GLB, | Performed by: REGISTERED NURSE

## 2021-12-16 PROCEDURE — 96372 THER/PROPH/DIAG INJ SC/IM: CPT | Mod: HCNC,S$GLB,, | Performed by: REGISTERED NURSE

## 2021-12-16 PROCEDURE — 1126F PR PAIN SEVERITY QUANTIFIED, NO PAIN PRESENT: ICD-10-PCS | Mod: HCNC,CPTII,S$GLB, | Performed by: REGISTERED NURSE

## 2021-12-16 PROCEDURE — 3078F PR MOST RECENT DIASTOLIC BLOOD PRESSURE < 80 MM HG: ICD-10-PCS | Mod: HCNC,CPTII,S$GLB, | Performed by: REGISTERED NURSE

## 2021-12-16 PROCEDURE — 99214 OFFICE O/P EST MOD 30 MIN: CPT | Mod: 25,HCNC,S$GLB, | Performed by: REGISTERED NURSE

## 2021-12-16 PROCEDURE — 3008F PR BODY MASS INDEX (BMI) DOCUMENTED: ICD-10-PCS | Mod: HCNC,CPTII,S$GLB, | Performed by: REGISTERED NURSE

## 2021-12-16 PROCEDURE — 1101F PT FALLS ASSESS-DOCD LE1/YR: CPT | Mod: HCNC,CPTII,S$GLB, | Performed by: REGISTERED NURSE

## 2021-12-16 RX ORDER — METHYLPREDNISOLONE ACETATE 80 MG/ML
40 INJECTION, SUSPENSION INTRA-ARTICULAR; INTRALESIONAL; INTRAMUSCULAR; SOFT TISSUE ONCE
Status: COMPLETED | OUTPATIENT
Start: 2021-12-16 | End: 2021-12-16

## 2021-12-16 RX ADMIN — METHYLPREDNISOLONE ACETATE 40 MG: 80 INJECTION, SUSPENSION INTRA-ARTICULAR; INTRALESIONAL; INTRAMUSCULAR; SOFT TISSUE at 11:12

## 2021-12-20 ENCOUNTER — HOSPITAL ENCOUNTER (OUTPATIENT)
Facility: HOSPITAL | Age: 76
Discharge: HOME | End: 2021-12-21
Attending: EMERGENCY MEDICINE | Admitting: FAMILY MEDICINE
Payer: MEDICARE

## 2021-12-20 DIAGNOSIS — R25.9 INVOLUNTARY MOVEMENTS: ICD-10-CM

## 2021-12-20 DIAGNOSIS — J81.1 PULMONARY EDEMA: ICD-10-CM

## 2021-12-20 DIAGNOSIS — N18.6 ESRD (END STAGE RENAL DISEASE): ICD-10-CM

## 2021-12-20 DIAGNOSIS — E11.42 DM TYPE 2 WITH DIABETIC PERIPHERAL NEUROPATHY: Primary | ICD-10-CM

## 2021-12-20 DIAGNOSIS — I50.31 ACUTE DIASTOLIC CONGESTIVE HEART FAILURE: ICD-10-CM

## 2021-12-20 DIAGNOSIS — I50.9 CHF (CONGESTIVE HEART FAILURE): ICD-10-CM

## 2021-12-20 DIAGNOSIS — R73.9 HYPERGLYCEMIA: ICD-10-CM

## 2021-12-20 DIAGNOSIS — R07.9 CHEST PAIN: ICD-10-CM

## 2021-12-20 DIAGNOSIS — J81.0 ACUTE PULMONARY EDEMA: ICD-10-CM

## 2021-12-20 DIAGNOSIS — N18.6 END STAGE RENAL FAILURE ON DIALYSIS: ICD-10-CM

## 2021-12-20 DIAGNOSIS — R06.02 SHORTNESS OF BREATH: ICD-10-CM

## 2021-12-20 DIAGNOSIS — Z99.2 END STAGE RENAL FAILURE ON DIALYSIS: ICD-10-CM

## 2021-12-20 PROBLEM — I50.32 CHRONIC DIASTOLIC HEART FAILURE: Status: ACTIVE | Noted: 2021-12-20

## 2021-12-20 LAB
ALBUMIN SERPL BCP-MCNC: 3.4 G/DL (ref 3.5–5.2)
ALP SERPL-CCNC: 83 U/L (ref 55–135)
ALT SERPL W/O P-5'-P-CCNC: 17 U/L (ref 10–44)
ANION GAP SERPL CALC-SCNC: 15 MMOL/L (ref 8–16)
AST SERPL-CCNC: 14 U/L (ref 10–40)
BASOPHILS # BLD AUTO: 0.01 K/UL (ref 0–0.2)
BASOPHILS NFR BLD: 0.1 % (ref 0–1.9)
BILIRUB SERPL-MCNC: 0.3 MG/DL (ref 0.1–1)
BNP SERPL-MCNC: 2742 PG/ML (ref 0–99)
BUN SERPL-MCNC: 45 MG/DL (ref 8–23)
CALCIUM SERPL-MCNC: 9 MG/DL (ref 8.7–10.5)
CHLORIDE SERPL-SCNC: 100 MMOL/L (ref 95–110)
CK SERPL-CCNC: 67 U/L (ref 20–180)
CO2 SERPL-SCNC: 25 MMOL/L (ref 23–29)
CREAT SERPL-MCNC: 6.8 MG/DL (ref 0.5–1.4)
CTP QC/QA: YES
DIFFERENTIAL METHOD: ABNORMAL
EOSINOPHIL # BLD AUTO: 0 K/UL (ref 0–0.5)
EOSINOPHIL NFR BLD: 0.4 % (ref 0–8)
ERYTHROCYTE [DISTWIDTH] IN BLOOD BY AUTOMATED COUNT: 14.1 % (ref 11.5–14.5)
EST. GFR  (AFRICAN AMERICAN): 6 ML/MIN/1.73 M^2
EST. GFR  (NON AFRICAN AMERICAN): 5 ML/MIN/1.73 M^2
GLUCOSE SERPL-MCNC: 527 MG/DL (ref 70–110)
HCT VFR BLD AUTO: 24.9 % (ref 37–48.5)
HGB BLD-MCNC: 8 G/DL (ref 12–16)
IMM GRANULOCYTES # BLD AUTO: 0.03 K/UL (ref 0–0.04)
IMM GRANULOCYTES NFR BLD AUTO: 0.4 % (ref 0–0.5)
LYMPHOCYTES # BLD AUTO: 0.5 K/UL (ref 1–4.8)
LYMPHOCYTES NFR BLD: 6.4 % (ref 18–48)
MCH RBC QN AUTO: 32.9 PG (ref 27–31)
MCHC RBC AUTO-ENTMCNC: 32.1 G/DL (ref 32–36)
MCV RBC AUTO: 103 FL (ref 82–98)
MONOCYTES # BLD AUTO: 0.3 K/UL (ref 0.3–1)
MONOCYTES NFR BLD: 3.9 % (ref 4–15)
NEUTROPHILS # BLD AUTO: 7.3 K/UL (ref 1.8–7.7)
NEUTROPHILS NFR BLD: 88.8 % (ref 38–73)
NRBC BLD-RTO: 0 /100 WBC
PLATELET # BLD AUTO: 252 K/UL (ref 150–450)
PMV BLD AUTO: 9.4 FL (ref 9.2–12.9)
POCT GLUCOSE: 230 MG/DL (ref 70–110)
POTASSIUM SERPL-SCNC: 4.2 MMOL/L (ref 3.5–5.1)
PROT SERPL-MCNC: 6.6 G/DL (ref 6–8.4)
RBC # BLD AUTO: 2.43 M/UL (ref 4–5.4)
SARS-COV-2 RDRP RESP QL NAA+PROBE: NEGATIVE
SODIUM SERPL-SCNC: 140 MMOL/L (ref 136–145)
TROPONIN I SERPL DL<=0.01 NG/ML-MCNC: 0.06 NG/ML (ref 0–0.03)
WBC # BLD AUTO: 8.22 K/UL (ref 3.9–12.7)

## 2021-12-20 PROCEDURE — 90935 HEMODIALYSIS ONE EVALUATION: CPT | Mod: HCNC

## 2021-12-20 PROCEDURE — 63600175 PHARM REV CODE 636 W HCPCS: Mod: HCNC | Performed by: FAMILY MEDICINE

## 2021-12-20 PROCEDURE — 83880 ASSAY OF NATRIURETIC PEPTIDE: CPT | Mod: HCNC | Performed by: REGISTERED NURSE

## 2021-12-20 PROCEDURE — 36415 COLL VENOUS BLD VENIPUNCTURE: CPT | Mod: HCNC | Performed by: FAMILY MEDICINE

## 2021-12-20 PROCEDURE — 80053 COMPREHEN METABOLIC PANEL: CPT | Mod: HCNC | Performed by: REGISTERED NURSE

## 2021-12-20 PROCEDURE — 25000003 PHARM REV CODE 250: Mod: HCNC | Performed by: FAMILY MEDICINE

## 2021-12-20 PROCEDURE — 27000221 HC OXYGEN, UP TO 24 HOURS: Mod: HCNC

## 2021-12-20 PROCEDURE — 85025 COMPLETE CBC W/AUTO DIFF WBC: CPT | Mod: HCNC | Performed by: REGISTERED NURSE

## 2021-12-20 PROCEDURE — 94761 N-INVAS EAR/PLS OXIMETRY MLT: CPT | Mod: HCNC

## 2021-12-20 PROCEDURE — G0378 HOSPITAL OBSERVATION PER HR: HCPCS | Mod: HCNC

## 2021-12-20 PROCEDURE — 93005 ELECTROCARDIOGRAM TRACING: CPT | Mod: HCNC

## 2021-12-20 PROCEDURE — U0002 COVID-19 LAB TEST NON-CDC: HCPCS | Mod: HCNC | Performed by: EMERGENCY MEDICINE

## 2021-12-20 PROCEDURE — 82550 ASSAY OF CK (CPK): CPT | Mod: HCNC | Performed by: FAMILY MEDICINE

## 2021-12-20 PROCEDURE — 93010 ELECTROCARDIOGRAM REPORT: CPT | Mod: HCNC,,, | Performed by: INTERNAL MEDICINE

## 2021-12-20 PROCEDURE — 84484 ASSAY OF TROPONIN QUANT: CPT | Mod: HCNC | Performed by: REGISTERED NURSE

## 2021-12-20 PROCEDURE — C9399 UNCLASSIFIED DRUGS OR BIOLOG: HCPCS | Mod: HCNC | Performed by: FAMILY MEDICINE

## 2021-12-20 PROCEDURE — 93010 EKG 12-LEAD: ICD-10-PCS | Mod: HCNC,,, | Performed by: INTERNAL MEDICINE

## 2021-12-20 PROCEDURE — 99291 CRITICAL CARE FIRST HOUR: CPT | Mod: 25,HCNC,CS

## 2021-12-20 PROCEDURE — 96372 THER/PROPH/DIAG INJ SC/IM: CPT | Mod: 59

## 2021-12-20 RX ORDER — IBUPROFEN 200 MG
24 TABLET ORAL
Status: DISCONTINUED | OUTPATIENT
Start: 2021-12-20 | End: 2021-12-21 | Stop reason: HOSPADM

## 2021-12-20 RX ORDER — CLOPIDOGREL BISULFATE 75 MG/1
75 TABLET ORAL DAILY
Status: DISCONTINUED | OUTPATIENT
Start: 2021-12-21 | End: 2021-12-21 | Stop reason: HOSPADM

## 2021-12-20 RX ORDER — IBUPROFEN 200 MG
16 TABLET ORAL
Status: DISCONTINUED | OUTPATIENT
Start: 2021-12-20 | End: 2021-12-21 | Stop reason: HOSPADM

## 2021-12-20 RX ORDER — FOLIC ACID 1 MG/1
1 TABLET ORAL DAILY
Status: DISCONTINUED | OUTPATIENT
Start: 2021-12-21 | End: 2021-12-21 | Stop reason: HOSPADM

## 2021-12-20 RX ORDER — SODIUM CHLORIDE 0.9 % (FLUSH) 0.9 %
10 SYRINGE (ML) INJECTION EVERY 12 HOURS PRN
Status: DISCONTINUED | OUTPATIENT
Start: 2021-12-20 | End: 2021-12-21 | Stop reason: HOSPADM

## 2021-12-20 RX ORDER — ENOXAPARIN SODIUM 100 MG/ML
30 INJECTION SUBCUTANEOUS EVERY 24 HOURS
Status: DISCONTINUED | OUTPATIENT
Start: 2021-12-20 | End: 2021-12-21 | Stop reason: HOSPADM

## 2021-12-20 RX ORDER — SEVELAMER CARBONATE 800 MG/1
800 TABLET, FILM COATED ORAL
Status: DISCONTINUED | OUTPATIENT
Start: 2021-12-21 | End: 2021-12-21 | Stop reason: HOSPADM

## 2021-12-20 RX ORDER — ERYTHROMYCIN 5 MG/G
OINTMENT OPHTHALMIC EVERY 8 HOURS
Status: DISCONTINUED | OUTPATIENT
Start: 2021-12-20 | End: 2021-12-21 | Stop reason: HOSPADM

## 2021-12-20 RX ORDER — PNV NO.95/FERROUS FUM/FOLIC AC 28MG-0.8MG
100 TABLET ORAL DAILY
Status: DISCONTINUED | OUTPATIENT
Start: 2021-12-21 | End: 2021-12-21 | Stop reason: HOSPADM

## 2021-12-20 RX ORDER — ERYTHROMYCIN 5 MG/G
OINTMENT OPHTHALMIC
COMMUNITY
Start: 2021-12-12 | End: 2022-07-12 | Stop reason: ALTCHOICE

## 2021-12-20 RX ORDER — METOPROLOL TARTRATE 50 MG/1
50 TABLET ORAL 2 TIMES DAILY
Status: DISCONTINUED | OUTPATIENT
Start: 2021-12-20 | End: 2021-12-21 | Stop reason: HOSPADM

## 2021-12-20 RX ORDER — NALOXONE HCL 0.4 MG/ML
0.02 VIAL (ML) INJECTION
Status: DISCONTINUED | OUTPATIENT
Start: 2021-12-20 | End: 2021-12-21 | Stop reason: HOSPADM

## 2021-12-20 RX ORDER — GLUCAGON 1 MG
1 KIT INJECTION
Status: DISCONTINUED | OUTPATIENT
Start: 2021-12-20 | End: 2021-12-21 | Stop reason: HOSPADM

## 2021-12-20 RX ADMIN — INSULIN DETEMIR 10 UNITS: 100 INJECTION, SOLUTION SUBCUTANEOUS at 09:12

## 2021-12-20 RX ADMIN — ERYTHROMYCIN 1 INCH: 5 OINTMENT OPHTHALMIC at 10:12

## 2021-12-20 RX ADMIN — ENOXAPARIN SODIUM 30 MG: 30 INJECTION, SOLUTION INTRAVENOUS; SUBCUTANEOUS at 09:12

## 2021-12-20 RX ADMIN — METOPROLOL TARTRATE 50 MG: 50 TABLET, FILM COATED ORAL at 09:12

## 2021-12-21 VITALS
RESPIRATION RATE: 17 BRPM | TEMPERATURE: 98 F | OXYGEN SATURATION: 97 % | WEIGHT: 141.31 LBS | BODY MASS INDEX: 23.54 KG/M2 | SYSTOLIC BLOOD PRESSURE: 122 MMHG | HEART RATE: 65 BPM | DIASTOLIC BLOOD PRESSURE: 59 MMHG | HEIGHT: 65 IN

## 2021-12-21 PROBLEM — R25.9 INVOLUNTARY MOVEMENTS: Status: RESOLVED | Noted: 2021-12-20 | Resolved: 2021-12-21

## 2021-12-21 LAB
ANION GAP SERPL CALC-SCNC: 13 MMOL/L (ref 8–16)
BUN SERPL-MCNC: 25 MG/DL (ref 8–23)
CALCIUM SERPL-MCNC: 8.7 MG/DL (ref 8.7–10.5)
CHLORIDE SERPL-SCNC: 107 MMOL/L (ref 95–110)
CO2 SERPL-SCNC: 21 MMOL/L (ref 23–29)
CREAT SERPL-MCNC: 4.3 MG/DL (ref 0.5–1.4)
EST. GFR  (AFRICAN AMERICAN): 11 ML/MIN/1.73 M^2
EST. GFR  (NON AFRICAN AMERICAN): 10 ML/MIN/1.73 M^2
GLUCOSE SERPL-MCNC: 275 MG/DL (ref 70–110)
POCT GLUCOSE: 283 MG/DL (ref 70–110)
POTASSIUM SERPL-SCNC: 4.5 MMOL/L (ref 3.5–5.1)
SODIUM SERPL-SCNC: 141 MMOL/L (ref 136–145)

## 2021-12-21 PROCEDURE — 99214 PR OFFICE/OUTPT VISIT, EST, LEVL IV, 30-39 MIN: ICD-10-PCS | Mod: HCNC,,, | Performed by: INTERNAL MEDICINE

## 2021-12-21 PROCEDURE — 25000003 PHARM REV CODE 250: Mod: HCNC | Performed by: FAMILY MEDICINE

## 2021-12-21 PROCEDURE — 36415 COLL VENOUS BLD VENIPUNCTURE: CPT | Mod: HCNC | Performed by: FAMILY MEDICINE

## 2021-12-21 PROCEDURE — G0378 HOSPITAL OBSERVATION PER HR: HCPCS | Mod: HCNC

## 2021-12-21 PROCEDURE — 27000221 HC OXYGEN, UP TO 24 HOURS: Mod: HCNC

## 2021-12-21 PROCEDURE — 94761 N-INVAS EAR/PLS OXIMETRY MLT: CPT | Mod: HCNC

## 2021-12-21 PROCEDURE — 96372 THER/PROPH/DIAG INJ SC/IM: CPT

## 2021-12-21 PROCEDURE — 97165 OT EVAL LOW COMPLEX 30 MIN: CPT | Mod: HCNC

## 2021-12-21 PROCEDURE — 99214 OFFICE O/P EST MOD 30 MIN: CPT | Mod: HCNC,,, | Performed by: INTERNAL MEDICINE

## 2021-12-21 PROCEDURE — 80048 BASIC METABOLIC PNL TOTAL CA: CPT | Mod: HCNC | Performed by: FAMILY MEDICINE

## 2021-12-21 PROCEDURE — 97161 PT EVAL LOW COMPLEX 20 MIN: CPT | Mod: HCNC | Performed by: PHYSICAL THERAPIST

## 2021-12-21 PROCEDURE — 97530 THERAPEUTIC ACTIVITIES: CPT | Mod: HCNC | Performed by: PHYSICAL THERAPIST

## 2021-12-21 PROCEDURE — 99900037 HC PT THERAPY SCREENING (STAT): Mod: HCNC | Performed by: PHYSICAL THERAPIST

## 2021-12-21 RX ORDER — INSULIN ASPART 100 [IU]/ML
0-5 INJECTION, SOLUTION INTRAVENOUS; SUBCUTANEOUS
Status: DISCONTINUED | OUTPATIENT
Start: 2021-12-21 | End: 2021-12-21 | Stop reason: HOSPADM

## 2021-12-21 RX ADMIN — SEVELAMER CARBONATE 800 MG: 800 TABLET, FILM COATED ORAL at 08:12

## 2021-12-21 RX ADMIN — FOLIC ACID 1 MG: 1 TABLET ORAL at 08:12

## 2021-12-21 RX ADMIN — CLOPIDOGREL 75 MG: 75 TABLET, FILM COATED ORAL at 08:12

## 2021-12-21 RX ADMIN — INSULIN DETEMIR 10 UNITS: 100 INJECTION, SOLUTION SUBCUTANEOUS at 08:12

## 2021-12-21 RX ADMIN — METOPROLOL TARTRATE 50 MG: 50 TABLET, FILM COATED ORAL at 08:12

## 2021-12-21 RX ADMIN — VITAM B12 100 MCG: 100 TAB at 08:12

## 2021-12-21 RX ADMIN — ERYTHROMYCIN 1 INCH: 5 OINTMENT OPHTHALMIC at 06:12

## 2021-12-27 ENCOUNTER — HOSPITAL ENCOUNTER (EMERGENCY)
Facility: HOSPITAL | Age: 76
Discharge: LEFT BEFORE DISCHARGE | End: 2021-12-27
Payer: MEDICARE

## 2021-12-27 ENCOUNTER — OFFICE VISIT (OUTPATIENT)
Dept: FAMILY MEDICINE | Facility: CLINIC | Age: 76
End: 2021-12-27
Payer: MEDICARE

## 2021-12-27 ENCOUNTER — HOSPITAL ENCOUNTER (OUTPATIENT)
Dept: RADIOLOGY | Facility: HOSPITAL | Age: 76
Discharge: HOME | End: 2021-12-27
Attending: REGISTERED NURSE
Payer: MEDICARE

## 2021-12-27 VITALS
BODY MASS INDEX: 23.58 KG/M2 | DIASTOLIC BLOOD PRESSURE: 78 MMHG | TEMPERATURE: 98 F | HEART RATE: 72 BPM | OXYGEN SATURATION: 99 % | HEIGHT: 65 IN | SYSTOLIC BLOOD PRESSURE: 140 MMHG | WEIGHT: 141.56 LBS

## 2021-12-27 DIAGNOSIS — E11.59 HYPERTENSION ASSOCIATED WITH DIABETES: ICD-10-CM

## 2021-12-27 DIAGNOSIS — R25.3 MUSCLE TWITCHING: ICD-10-CM

## 2021-12-27 DIAGNOSIS — R06.2 WHEEZING: ICD-10-CM

## 2021-12-27 DIAGNOSIS — R06.00 DYSPNEA, UNSPECIFIED TYPE: ICD-10-CM

## 2021-12-27 DIAGNOSIS — I15.2 HYPERTENSION ASSOCIATED WITH DIABETES: ICD-10-CM

## 2021-12-27 DIAGNOSIS — J81.0 ACUTE PULMONARY EDEMA: Primary | ICD-10-CM

## 2021-12-27 DIAGNOSIS — N18.6 ESRD (END STAGE RENAL DISEASE): ICD-10-CM

## 2021-12-27 PROCEDURE — 71046 X-RAY EXAM CHEST 2 VIEWS: CPT | Mod: TC,HCNC,FY

## 2021-12-27 PROCEDURE — 1101F PR PT FALLS ASSESS DOC 0-1 FALLS W/OUT INJ PAST YR: ICD-10-PCS | Mod: HCNC,CPTII,S$GLB, | Performed by: REGISTERED NURSE

## 2021-12-27 PROCEDURE — 3077F PR MOST RECENT SYSTOLIC BLOOD PRESSURE >= 140 MM HG: ICD-10-PCS | Mod: HCNC,CPTII,S$GLB, | Performed by: REGISTERED NURSE

## 2021-12-27 PROCEDURE — 3078F PR MOST RECENT DIASTOLIC BLOOD PRESSURE < 80 MM HG: ICD-10-PCS | Mod: HCNC,CPTII,S$GLB, | Performed by: REGISTERED NURSE

## 2021-12-27 PROCEDURE — 3008F BODY MASS INDEX DOCD: CPT | Mod: HCNC,CPTII,S$GLB, | Performed by: REGISTERED NURSE

## 2021-12-27 PROCEDURE — 99999 PR PBB SHADOW E&M-EST. PATIENT-LVL IV: ICD-10-PCS | Mod: PBBFAC,HCNC,, | Performed by: REGISTERED NURSE

## 2021-12-27 PROCEDURE — 99214 OFFICE O/P EST MOD 30 MIN: CPT | Mod: HCNC,S$GLB,, | Performed by: REGISTERED NURSE

## 2021-12-27 PROCEDURE — 3288F FALL RISK ASSESSMENT DOCD: CPT | Mod: HCNC,CPTII,S$GLB, | Performed by: REGISTERED NURSE

## 2021-12-27 PROCEDURE — 1159F MED LIST DOCD IN RCRD: CPT | Mod: HCNC,CPTII,S$GLB, | Performed by: REGISTERED NURSE

## 2021-12-27 PROCEDURE — 99900041 HC LEFT WITHOUT BEING SEEN- EMERGENCY: Mod: HCNC

## 2021-12-27 PROCEDURE — 71046 XR CHEST PA AND LATERAL: ICD-10-PCS | Mod: 26,HCNC,, | Performed by: RADIOLOGY

## 2021-12-27 PROCEDURE — 3077F SYST BP >= 140 MM HG: CPT | Mod: HCNC,CPTII,S$GLB, | Performed by: REGISTERED NURSE

## 2021-12-27 PROCEDURE — 99214 PR OFFICE/OUTPT VISIT, EST, LEVL IV, 30-39 MIN: ICD-10-PCS | Mod: HCNC,S$GLB,, | Performed by: REGISTERED NURSE

## 2021-12-27 PROCEDURE — 99999 PR PBB SHADOW E&M-EST. PATIENT-LVL IV: CPT | Mod: PBBFAC,HCNC,, | Performed by: REGISTERED NURSE

## 2021-12-27 PROCEDURE — 1126F AMNT PAIN NOTED NONE PRSNT: CPT | Mod: HCNC,CPTII,S$GLB, | Performed by: REGISTERED NURSE

## 2021-12-27 PROCEDURE — 1126F PR PAIN SEVERITY QUANTIFIED, NO PAIN PRESENT: ICD-10-PCS | Mod: HCNC,CPTII,S$GLB, | Performed by: REGISTERED NURSE

## 2021-12-27 PROCEDURE — 3288F PR FALLS RISK ASSESSMENT DOCUMENTED: ICD-10-PCS | Mod: HCNC,CPTII,S$GLB, | Performed by: REGISTERED NURSE

## 2021-12-27 PROCEDURE — 71046 X-RAY EXAM CHEST 2 VIEWS: CPT | Mod: 26,HCNC,, | Performed by: RADIOLOGY

## 2021-12-27 PROCEDURE — 1159F PR MEDICATION LIST DOCUMENTED IN MEDICAL RECORD: ICD-10-PCS | Mod: HCNC,CPTII,S$GLB, | Performed by: REGISTERED NURSE

## 2021-12-27 PROCEDURE — 3078F DIAST BP <80 MM HG: CPT | Mod: HCNC,CPTII,S$GLB, | Performed by: REGISTERED NURSE

## 2021-12-27 PROCEDURE — 3008F PR BODY MASS INDEX (BMI) DOCUMENTED: ICD-10-PCS | Mod: HCNC,CPTII,S$GLB, | Performed by: REGISTERED NURSE

## 2021-12-27 PROCEDURE — 3052F PR MOST RECENT HEMOGLOBIN A1C LEVEL 8.0 - < 9.0%: ICD-10-PCS | Mod: HCNC,CPTII,S$GLB, | Performed by: REGISTERED NURSE

## 2021-12-27 PROCEDURE — 1101F PT FALLS ASSESS-DOCD LE1/YR: CPT | Mod: HCNC,CPTII,S$GLB, | Performed by: REGISTERED NURSE

## 2021-12-27 PROCEDURE — 3052F HG A1C>EQUAL 8.0%<EQUAL 9.0%: CPT | Mod: HCNC,CPTII,S$GLB, | Performed by: REGISTERED NURSE

## 2021-12-27 NOTE — PROGRESS NOTES
"Subjective:       Patient ID: Avril Monzon is a 76 y.o. female.      Chief Complaint   Patient presents with    Follow-up      Pt c/o shortness of breathe and wheezing. Pt also c/o of body pain. Pt went to ER 1 wk ago.       HPI    Mrs. Monzon is here for hosp f/u.  Admitted to [facility] for overnight observation on 12/20/2021.     HPI:   Ms Monzon is a 76 y.o. female with a pmh of ESRD on HD, DM2, hyperlipidemia and h/o CVA who was brought to ED by family today due to weakness and involuntary movements at home. Patient was feeling so bad she opted to skip dialysis and come straight to ER. Patient's daughter states her mother was recently started on atorvastatin 80mg as well as plavix.     ER Findings:  -CT head without acute changes, small vessel ischemic dz  -CXR - pulmonary edema with small bilateral pleural effusions  - EKG - NSR 74 bpm  -Temp 98.2  -SPO2 92% on RA  -BNP 2742  -Troponin - 0.06     ER physician discussed the case with Nephrologist Dr. Ramirez and patient was admitted for routine HD and for further evaluation and treatment of involuntary movements and weakness. At the time of this interview and physical exam patient is c/o "twitching" denies cp, sob, abd pain, nausea, vomiting or diarrhea.     Hospital Course:   Patient was kept on OBS for involuntary movements under the care of hospital medicine. Her Atorvastatin was held and she had HD. Overnight night the involuntary movements have stopped and pt is sitting in chair at bedside. Patient will dc home today and atorvastatin will be dc'd. She will f/u OP with PCP for f/u on statin adjustments. Patient was seen and examined today and deemed stable for discharge home.      She reports feeling better although she reports still w/ some wheezing and gets winded easily.  Did test neg for flu and covid.  Admits to increased intake of fast food with high salt content.  Staying w/ her daughter who does not cook, fast foot & eating out/take out daily.  HD " schedule MWF.      Review of Systems   Constitutional: Positive for activity change and fatigue. Negative for appetite change, chills and diaphoresis.   HENT: Negative.    Eyes: Negative for photophobia and visual disturbance.   Respiratory: Positive for cough, chest tightness, shortness of breath and wheezing.    Cardiovascular: Negative.    Gastrointestinal: Negative for abdominal pain, constipation, diarrhea, nausea and vomiting.   Neurological: Positive for weakness. Negative for dizziness, light-headedness and headaches.         Review of patient's allergies indicates:   Allergen Reactions    Codeine Swelling    Darvon [propoxyphene] Swelling         Patient Active Problem List   Diagnosis    Constipation    Hypertension associated with diabetes    Type 2 diabetes mellitus with hyperglycemia, with long-term current use of insulin    ESRD (end stage renal disease)    Proteinuria    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, with long-term current use of insulin    Cataracta brunescens of right eye    Bilateral carotid artery disease    Aortic atherosclerosis    Type 2 diabetes mellitus with circulatory disorder, with long-term current use of insulin    History of CVA (cerebrovascular accident)    Hyperparathyroidism    Vitamin D deficiency    DDD (degenerative disc disease), lumbar    Anemia in chronic kidney disease, on chronic dialysis    Iron deficiency anemia due to chronic blood loss    Hyperlipidemia associated with type 2 diabetes mellitus    Pulmonary hypertension    Acute CHF    Coronary artery disease involving native coronary artery of native heart    Exudative age-related macular degeneration of left eye with active choroidal neovascularization    Other headache syndrome    Non-rheumatic mitral regurgitation    Non-rheumatic tricuspid valve insufficiency    Age-related nuclear cataract of left eye    DM type 2 with diabetic peripheral  neuropathy    Pulmonary edema    Unspecified inflammatory spondylopathy, lumbar region    Convulsions    Hemiplegia    Other specified complication of vascular prosthetic devices, implants and grafts, initial encounter    Elevated troponin    Chronic diastolic heart failure           Current Outpatient Medications:     ACCU-CHEK ARDEN PLUS METER [facility], USE TO TEST TWICE DAILY, Disp: , Rfl: 0    aspirin (ECOTRIN) 81 MG EC tablet, Take 1 tablet (81 mg total) by mouth once daily., Disp: , Rfl: 0    BIDIL 20-37.5 mg Tab, TAKE 2 TABLETS BY MOUTH THREE TIMES DAILY, Disp: 180 tablet, Rfl: 3    blood sugar diagnostic Strp, 1 strip by Misc.(Non-Drug; Combo Route) route 3 (three) times daily. relion ultima, Disp: 100 strip, Rfl: 11    clopidogreL (PLAVIX) 75 mg tablet, Take 1 tablet (75 mg total) by mouth once daily., Disp: 60 tablet, Rfl: 0    cyanocobalamin (VITAMIN B-12) 1000 MCG tablet, Take 100 mcg by mouth once daily., Disp: , Rfl:     erythromycin (ROMYCIN) ophthalmic ointment, PLACE A SMALL AMOUNT INTO LEFT EYE THREE TIMES DAILY AS DIRECTED, Disp: , Rfl:     folic acid (FOLVITE) 1 MG tablet, Take 1 mg by mouth once daily., Disp: , Rfl:     insulin NPH/Reg human (HUMULIN, 70/30,) 100 unit/mL (70-30) InPn pen, To do 28 units subq in AM and 8 units subq in PM.  Dose to be done within 15 min before or after meal. (Patient taking differently: Inject 28 units into the skin every morning and inject 8 units into the skin every evening.  Dose to be done within 15 min before or after meal.), Disp: 3 each, Rfl: 1    lancets (ACCU-CHEK SOFTCLIX LANCETS) Misc, 1 lancet by Misc.(Non-Drug; Combo Route) route 3 (three) times daily., Disp: 100 each, Rfl: 11    loratadine (CLARITIN) 10 mg tablet, Take 1 tablet (10 mg total) by mouth once daily., Disp: 30 tablet, Rfl: 0    metoprolol tartrate (LOPRESSOR) 50 MG tablet, Take 1 tablet (50 mg total) by mouth 2 (two) times daily., Disp: 60 tablet, Rfl: 6    moxifloxacin  "(VIGAMOX) 0.5 % ophthalmic solution, Place 1 drop into both eyes 3 (three) times daily. for 7 days, Disp: 3 mL, Rfl: 0    sevelamer carbonate (RENVELA) 800 mg Tab, Take 1 tablet (800 mg total) by mouth 3 (three) times daily with meals., Disp: 90 tablet, Rfl: 2      Past medical, surgical, family and social histories have been reviewed today.      Objective:     Vitals:    21 1439   BP: (!) 140/78   Pulse: 72   Temp: 97.9 °F (36.6 °C)   SpO2: 99%   Weight: 64.2 kg (141 lb 8.6 oz)   Height: 5' 5" (1.651 m)   PainSc: 0-No pain         Physical Exam  Constitutional:       General: She is not in acute distress.  HENT:      Head: Normocephalic and atraumatic.   Cardiovascular:      Rate and Rhythm: Normal rate and regular rhythm.      Pulses: Normal pulses.   Pulmonary:      Effort: Pulmonary effort is normal.      Breath sounds: Rhonchi present. No wheezing.   Chest:      Chest wall: No tenderness.   Musculoskeletal:      Right lower le+ Edema present.      Left lower le+ Edema present.   Skin:     Capillary Refill: Capillary refill takes less than 2 seconds.   Neurological:      Mental Status: She is alert and oriented to person, place, and time.      Sensory: No sensory deficit.      Motor: No weakness.      Coordination: Coordination normal.      Gait: Gait normal.           Assessment     1. Acute pulmonary edema  Resolving but still mildly symptomatic.  Missing HD appt today.  CXR pending.  Take fluid pill as ordered.   2. Dyspnea, unspecified type  X-Ray Chest PA And Lateral   3. Wheezing  X-Ray Chest PA And Lateral   4. Muscle twitching  Resolved with discontinuation of atorvastatin.   5. Hypertension associated with diabetes  BP borderline high in office today, bp meds were recently adjusted.  She is due for HD today but missed to come here for f/u appt.     6. ESRD (end stage renal disease)  Advised to contact Nephrology &/or HD center to have treatment tomorrow.          Follow-up     Recheck " with PCP in 1 month.  Return to clinic as needed.      SANCHEZ Salvador  [facility]

## 2021-12-28 RX ORDER — BUMETANIDE 1 MG/1
1 TABLET ORAL DAILY
Qty: 3 TABLET | Refills: 0 | Status: SHIPPED | OUTPATIENT
Start: 2021-12-28 | End: 2022-02-17

## 2022-01-03 ENCOUNTER — TELEPHONE (OUTPATIENT)
Dept: FAMILY MEDICINE | Facility: CLINIC | Age: 77
End: 2022-01-03
Payer: MEDICARE

## 2022-01-03 NOTE — TELEPHONE ENCOUNTER
----- Message from Jian William NP sent at 12/28/2021  6:13 PM CST -----  CXR does show new small effusions (fluid collection) to both lungs.  I am ordering a low-dose fluid pill for her only for a few days to see if we can get some of this out.  I don't want to do it for too long or at too high of a dose as she is a dialysis patient.  I want her to contact me in a few days to let me know how she is doing.  To ER if she feels worse or not getting any better.

## 2022-01-04 ENCOUNTER — HOSPITAL ENCOUNTER (OUTPATIENT)
Dept: RADIOLOGY | Facility: HOSPITAL | Age: 77
Discharge: HOME | End: 2022-01-04
Attending: INTERNAL MEDICINE
Payer: MEDICARE

## 2022-01-04 ENCOUNTER — PES CALL (OUTPATIENT)
Dept: ADMINISTRATIVE | Facility: CLINIC | Age: 77
End: 2022-01-04
Payer: MEDICARE

## 2022-01-04 ENCOUNTER — HOSPITAL ENCOUNTER (OUTPATIENT)
Dept: CARDIOLOGY | Facility: HOSPITAL | Age: 77
Discharge: HOME | End: 2022-01-04
Attending: INTERNAL MEDICINE
Payer: MEDICARE

## 2022-01-04 DIAGNOSIS — E11.65 TYPE 2 DIABETES MELLITUS WITH HYPERGLYCEMIA, WITH LONG-TERM CURRENT USE OF INSULIN: ICD-10-CM

## 2022-01-04 DIAGNOSIS — I27.20 PULMONARY HYPERTENSION: ICD-10-CM

## 2022-01-04 DIAGNOSIS — N18.6 ESRD ON DIALYSIS: ICD-10-CM

## 2022-01-04 DIAGNOSIS — Z79.4 TYPE 2 DIABETES MELLITUS WITH HYPERGLYCEMIA, WITH LONG-TERM CURRENT USE OF INSULIN: ICD-10-CM

## 2022-01-04 DIAGNOSIS — Z99.2 ESRD ON DIALYSIS: ICD-10-CM

## 2022-01-04 DIAGNOSIS — I34.0 NON-RHEUMATIC MITRAL REGURGITATION: ICD-10-CM

## 2022-01-04 DIAGNOSIS — I25.111 CORONARY ARTERY DISEASE INVOLVING NATIVE CORONARY ARTERY OF NATIVE HEART WITH ANGINA PECTORIS WITH DOCUMENTED SPASM: ICD-10-CM

## 2022-01-04 DIAGNOSIS — R07.9 CHEST PAIN, UNSPECIFIED TYPE: ICD-10-CM

## 2022-01-04 DIAGNOSIS — I65.23 BILATERAL CAROTID ARTERY STENOSIS: ICD-10-CM

## 2022-01-04 DIAGNOSIS — R79.89 ELEVATED TROPONIN: ICD-10-CM

## 2022-01-04 DIAGNOSIS — I70.0 AORTIC ATHEROSCLEROSIS: ICD-10-CM

## 2022-01-04 LAB
CV STRESS BASE HR: 68 BPM
DIASTOLIC BLOOD PRESSURE: 67 MMHG
NUC REST EJECTION FRACTION: 64
NUC STRESS EJECTION FRACTION: 65 %
OHS CV CPX 85 PERCENT MAX PREDICTED HEART RATE MALE: 120
OHS CV CPX ESTIMATED METS: 1
OHS CV CPX MAX PREDICTED HEART RATE: 141
OHS CV CPX PATIENT IS FEMALE: 1
OHS CV CPX PATIENT IS MALE: 0
OHS CV CPX PEAK DIASTOLIC BLOOD PRESSURE: 54 MMHG
OHS CV CPX PEAK HEAR RATE: 82 BPM
OHS CV CPX PEAK RATE PRESSURE PRODUCT: 9758
OHS CV CPX PEAK SYSTOLIC BLOOD PRESSURE: 119 MMHG
OHS CV CPX PERCENT MAX PREDICTED HEART RATE ACHIEVED: 58
OHS CV CPX RATE PRESSURE PRODUCT PRESENTING: NORMAL
STRESS ECHO POST EXERCISE DUR MIN: 1 MINUTES
SYSTOLIC BLOOD PRESSURE: 156 MMHG

## 2022-01-04 PROCEDURE — 93016 CV STRESS TEST SUPVJ ONLY: CPT | Mod: HCNC,,, | Performed by: INTERNAL MEDICINE

## 2022-01-04 PROCEDURE — 93017 CV STRESS TEST TRACING ONLY: CPT | Mod: HCNC

## 2022-01-04 PROCEDURE — 78452 HT MUSCLE IMAGE SPECT MULT: CPT | Mod: 26,HCNC,, | Performed by: INTERNAL MEDICINE

## 2022-01-04 PROCEDURE — 93018 CV STRESS TEST I&R ONLY: CPT | Mod: HCNC,,, | Performed by: INTERNAL MEDICINE

## 2022-01-04 PROCEDURE — 63600175 PHARM REV CODE 636 W HCPCS: Mod: HCNC | Performed by: INTERNAL MEDICINE

## 2022-01-04 PROCEDURE — 93016 STRESS TEST WITH MYOCARDIAL PERFUSION (CUPID ONLY): ICD-10-PCS | Mod: HCNC,,, | Performed by: INTERNAL MEDICINE

## 2022-01-04 PROCEDURE — 93018 STRESS TEST WITH MYOCARDIAL PERFUSION (CUPID ONLY): ICD-10-PCS | Mod: HCNC,,, | Performed by: INTERNAL MEDICINE

## 2022-01-04 PROCEDURE — A9502 TC99M TETROFOSMIN: HCPCS | Mod: HCNC

## 2022-01-04 PROCEDURE — 78452 STRESS TEST WITH MYOCARDIAL PERFUSION (CUPID ONLY): ICD-10-PCS | Mod: 26,HCNC,, | Performed by: INTERNAL MEDICINE

## 2022-01-04 RX ORDER — REGADENOSON 0.08 MG/ML
0.4 INJECTION, SOLUTION INTRAVENOUS ONCE
Status: COMPLETED | OUTPATIENT
Start: 2022-01-04 | End: 2022-01-04

## 2022-01-04 RX ADMIN — REGADENOSON 0.4 MG: 0.08 INJECTION, SOLUTION INTRAVENOUS at 01:01

## 2022-01-05 ENCOUNTER — TELEPHONE (OUTPATIENT)
Dept: CARDIOLOGY | Facility: CLINIC | Age: 77
End: 2022-01-05
Payer: MEDICARE

## 2022-01-05 NOTE — TELEPHONE ENCOUNTER
The patient has been notified of this information and all questions answered.      ----- Message from Demetrio Mejia MD sent at 1/5/2022  2:37 PM CST -----  Please contact the patient and let them know that the nuclear stress test reveals a large scar in one wall of the heart muscle but otherwise normal function overall, this does not require any stents or angiogram, will continue medical treatment. Follow up with me as scheduled.

## 2022-01-07 NOTE — PROGRESS NOTES
Addended by: XUAN TRACY on: 1/7/2022 10:58 AM     Modules accepted: Orders     She had home    Subjective:      DATE OF VISIT: 7/1/2020   ?   ?   Patient ID:?Avril Monzon is a 75 y.o. female.?? MR#: [id number]   ?   PRIMARY PROVIDER: Dr. De La Cruz    CHIEF COMPLAINT:  Follow-up  ?   DIAGNOSIS:  Anemia of chronic kidney disease?????   ?   CURRENT TREATMENT:  DIANE, [address] -> 40,000 units every other week for hemoglobin less than 10    INTERVAL EVENTS    Ms. Monzon is doing well today.  She has chronic worsening vision secondary to diabetic retinopathy and relies on her daughter for driving which she is frustrated by.  She is doing well otherwise in stable condition.  She had bone marrow biopsy procedure went well.  She has had no issues with bleeding or bruising, fever chills or other infectious symptoms.  She comes to review results of bone marrow biopsy as well as next DIANE.  She notes checking blood pressure at home and under good control less than 140/80 however has been elevated multiple times in our clinic/treatment area.  She denies any headaches or other symptoms.      Review of Systems    ?   A comprehensive 14-point review of systems was reviewed with patient and was negative other than as specified above.   ?     Objective:      Physical Exam      ?   Vitals:    07/01/20 1042   BP: (!) 174/69   Pulse: 68   Resp: 18   Temp: 97.7 °F (36.5 °C)      ECOG:?0   General appearance: Generally well appearing, in no acute distress.   Head, eyes, ears, nose, and throat: moist mucous membranes.   Respiratory:  Normal work of breathing  Abdomen: nontender, nondistended.   Extremities: Warm, without edema.   Neurologic: Alert and oriented. Grossly normal strength, coordination, and gait.   Skin: No rashes, ecchymoses or petechial lesion.   Psychiatric:  Normal mood and affect.    Laboratory:  ?   Lab Visit on 07/01/2020   Component Date Value Ref Range Status    WBC 07/01/2020 4.11  3.90 - 12.70 K/uL Final    RBC 07/01/2020 2.92* 4.00 - 5.40 M/uL Final    Hemoglobin 07/01/2020 8.7* 12.0 - 16.0 g/dL  Final    Hematocrit 07/01/2020 29.1* 37.0 - 48.5 % Final    Mean Corpuscular Volume 07/01/2020 100* 82 - 98 fL Final    Mean Corpuscular Hemoglobin 07/01/2020 29.8  27.0 - 31.0 pg Final    Mean Corpuscular Hemoglobin Conc 07/01/2020 29.9* 32.0 - 36.0 g/dL Final    RDW 07/01/2020 12.7  11.5 - 14.5 % Final    Platelets 07/01/2020 174  150 - 350 K/uL Final    MPV 07/01/2020 9.5  9.2 - 12.9 fL Final    Immature Granulocytes 07/01/2020 0.2  0.0 - 0.5 % Final    Gran # (ANC) 07/01/2020 3.2  1.8 - 7.7 K/uL Final    Immature Grans (Abs) 07/01/2020 0.01  0.00 - 0.04 K/uL Final    Lymph # 07/01/2020 0.7* 1.0 - 4.8 K/uL Final    Mono # 07/01/2020 0.2* 0.3 - 1.0 K/uL Final    Eos # 07/01/2020 0.0  0.0 - 0.5 K/uL Final    Baso # 07/01/2020 0.01  0.00 - 0.20 K/uL Final    nRBC 07/01/2020 0  0 /100 WBC Final    Gran% 07/01/2020 78.2* 38.0 - 73.0 % Final    Lymph% 07/01/2020 15.8* 18.0 - 48.0 % Final    Mono% 07/01/2020 4.6  4.0 - 15.0 % Final    Eosinophil% 07/01/2020 1.0  0.0 - 8.0 % Final    Basophil% 07/01/2020 0.2  0.0 - 1.9 % Final    Differential Method 07/01/2020 Automated   Final    Sodium 07/01/2020 142  136 - 145 mmol/L Final    Potassium 07/01/2020 5.2* 3.5 - 5.1 mmol/L Final    Chloride 07/01/2020 109  95 - 110 mmol/L Final    CO2 07/01/2020 23  23 - 29 mmol/L Final    Glucose 07/01/2020 350* 70 - 110 mg/dL Final    BUN, Bld 07/01/2020 89* 8 - 23 mg/dL Final    Creatinine 07/01/2020 5.2* 0.5 - 1.4 mg/dL Final    Calcium 07/01/2020 8.9  8.7 - 10.5 mg/dL Final    Total Protein 07/01/2020 6.2  6.0 - 8.4 g/dL Final    Albumin 07/01/2020 3.6  3.5 - 5.2 g/dL Final    Total Bilirubin 07/01/2020 0.2  0.1 - 1.0 mg/dL Final    Alkaline Phosphatase 07/01/2020 78  55 - 135 U/L Final    AST 07/01/2020 97* 10 - 40 U/L Final    ALT 07/01/2020 106* 10 - 44 U/L Final    Anion Gap 07/01/2020 10  8 - 16 mmol/L Final    eGFR if African American 07/01/2020 9* >60 mL/min/1.73 m^2 Final    eGFR if non   07/01/2020 8* >60 mL/min/1.73 m^2 Final      ?   ?  Lab Results   Component Value Date    IRON 56 05/11/2020    TIBC 275 05/11/2020    FERRITIN 605 (H) 05/11/2020      20% saturation    Assessment/Plan:       1. Anemia due to stage 5 chronic kidney disease    2. Hypertension associated with diabetes    3. 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, with long-term current use of insulin    4. Hyperglycemia          Plan:     # anemia:  Attributed to anemia of chronic kidney disease replete iron as needed most recently within normal limits.  Bone marrow biopsy 06/2020 reviewed with her today and although some limited sample to best of our ability there is not appear to be evidence of acute leukemia/blast or notable dysplastic/neoplastic process.  She understands given limited sample cannot exclude any yearly neoplastic process and will need to closely monitor for any change in constitutional symptoms or CBC which may indicate repeat bone marrow biopsy.  At this time will continue DIANE treatment in [place] dose to 459[address] units every other week.    # chronic kidney disease:  Progressive renal disease associated with type 2 diabetes and hypertension.  Follow-up with renal/vascular pending initiation of dialysis.    # hypertension:  Persistently poorly controlled our clinic/treatment area but remains asymptomatic.  Encouraged her several times to pursue PCP/cardiology follow-up for better medical management has appointment today.  [place]    # Hyperglycemia:  Poorly controlled, recommend follow-up with primary care.      Follow-Up:   - procrit today 40K and Q 2 weeks  - bone marrow biopsy in hospital  - RV in 4 weeks with labs prior and procrit

## 2022-01-10 ENCOUNTER — TELEPHONE (OUTPATIENT)
Dept: FAMILY MEDICINE | Facility: CLINIC | Age: 77
End: 2022-01-10

## 2022-01-10 NOTE — TELEPHONE ENCOUNTER
Starting on her basal insulin was not discussed.  I did refill the insulin she had been on.  Her A1c is due ~ 1/19 and can discuss any changes to treatment with PCP.

## 2022-01-10 NOTE — TELEPHONE ENCOUNTER
Spoke with pt and pt voice understood results. Pt would like to know if you can send her in some long lasting insulin and the needles that ya'll discussed in pt's last visit. Please advise

## 2022-01-12 NOTE — TELEPHONE ENCOUNTER
Spoke with daughter and daughter would like to know if the order for her mom's A1c will be in on the 1/19/22? Pt has a lab schedule at the [facility] for 1/25/22, would you like for her to get it done then. Please advise

## 2022-01-13 DIAGNOSIS — Z99.2 TYPE 2 DIABETES MELLITUS WITH CHRONIC KIDNEY DISEASE ON CHRONIC DIALYSIS, WITH LONG-TERM CURRENT USE OF INSULIN: Primary | ICD-10-CM

## 2022-01-13 DIAGNOSIS — E11.22 TYPE 2 DIABETES MELLITUS WITH CHRONIC KIDNEY DISEASE ON CHRONIC DIALYSIS, WITH LONG-TERM CURRENT USE OF INSULIN: Primary | ICD-10-CM

## 2022-01-13 DIAGNOSIS — N18.6 TYPE 2 DIABETES MELLITUS WITH CHRONIC KIDNEY DISEASE ON CHRONIC DIALYSIS, WITH LONG-TERM CURRENT USE OF INSULIN: Primary | ICD-10-CM

## 2022-01-13 DIAGNOSIS — Z79.4 TYPE 2 DIABETES MELLITUS WITH CHRONIC KIDNEY DISEASE ON CHRONIC DIALYSIS, WITH LONG-TERM CURRENT USE OF INSULIN: Primary | ICD-10-CM

## 2022-02-01 ENCOUNTER — TELEPHONE (OUTPATIENT)
Dept: FAMILY MEDICINE | Facility: CLINIC | Age: 77
End: 2022-02-01
Payer: MEDICARE

## 2022-02-01 ENCOUNTER — OUTPATIENT CASE MANAGEMENT (OUTPATIENT)
Dept: ADMINISTRATIVE | Facility: OTHER | Age: 77
End: 2022-02-01
Payer: MEDICARE

## 2022-02-01 RX ORDER — INSULIN PUMP SYRINGE, 3 ML
EACH MISCELLANEOUS
Qty: 1 EACH | Refills: 0 | Status: SHIPPED | OUTPATIENT
Start: 2022-02-01 | End: 2022-07-12 | Stop reason: SDUPTHER

## 2022-02-01 RX ORDER — LANCETS
EACH MISCELLANEOUS
Qty: 100 EACH | Refills: 6 | Status: SHIPPED | OUTPATIENT
Start: 2022-02-01 | End: 2022-08-30 | Stop reason: ALTCHOICE

## 2022-02-01 NOTE — TELEPHONE ENCOUNTER
----- Message from Debbie Lopez RN sent at 2/1/2022  4:18 PM CST -----  Dr Germain,    I just spoke with Mrs Monzon and she advised she missed an appt with Jian this afternoon because they are moving today and her daughter did not get back home in time to bring her to the appt. She does report she has ongoing SOB and wants to be rescheduled as soon as possible to be seen. Please be aware that she dialyzes M @ 10:30 AM and W and F @ 7:30 AM.     Also, she reports she has a glucometer at home, but it is old and does not work good. Will you please order her a new True Metrix or One Touch Ultra Mini(both covered by [employer]) with testing supplies and send the order to her local [facility] pharmacy?    Thank you,     Debbie Lopez RN, [facility]  Outpatient Case Management  [phone]  Ext [phone]  [email or web address]

## 2022-02-01 NOTE — LETTER
February 2, 2022    Avril Monzon  [address] [place] [address]             [facility]  [address]  [place] [address] Welcome to [facility]s Complex Care Management Program.  It was a pleasure talking with you today.  My name is Debbie Lopez. I look forward to working with you as your .  My goal is to help you function at the healthiest and highest level possible.  You can contact me directly at [phone].    As an [facility] patient with [employer], some of the services we may be able to provide include:      Development of an individualized care plan with a Registered Nurse    Connection with a    Connection with available resources and services     Coordinate communication among your care team members    Provide coaching and education    Help you understand your doctors treatment plan   Help you obtain information about your insurance coverage.     All services provided by [facility]s Complex Care Managers and other care team members are coordinated with and communicated to your primary care team.    As part of your enrollment, you will be receiving education materials and more information about these services in your [facility] account, by phone or through the mail.  If you do not wish to participate or receive information, please contact our office at [phone].      Sincerely,        Debbie Lopez RN, CCM [facility]   Out-patient RN Complex Care Manager

## 2022-02-01 NOTE — TELEPHONE ENCOUNTER
----- Message from Yesica Lombardi MA sent at 2/1/2022  4:39 PM CST -----   I did not do a refill on the glucose meter because pt is requesting a certain brand with strips and lancets please advise wants sent to [facility]. Also I can schedule her for tomorrow if that is ok with you please advise.   ----- Message -----  From: Debbie Lopez RN  Sent: 2/1/2022   4:25 PM CST  To: [facility] POOL Staff    Dr Germain,    I just spoke with Mrs Monzon and she advised she missed an appt with Jian this afternoon because they are moving today and her daughter did not get back home in time to bring her to the appt. She does report she has ongoing SOB and wants to be rescheduled as soon as possible to be seen. Please be aware that she dialyzes M @ 10:30 AM and W and F @ 7:30 AM.     Also, she reports she has a glucometer at home, but it is old and does not work good. Will you please order her a new True Metrix or One Touch Ultra Mini(both covered by [employer]) with testing supplies and send the order to her local [facility] pharmacy?    Thank you,     Debbie Lopez RN, [facility]  Outpatient Case Management  [phone]  Ext [phone]  [email or web address]

## 2022-02-02 ENCOUNTER — TELEPHONE (OUTPATIENT)
Dept: FAMILY MEDICINE | Facility: CLINIC | Age: 77
End: 2022-02-02
Payer: MEDICARE

## 2022-02-02 DIAGNOSIS — E11.22 TYPE 2 DIABETES MELLITUS WITH CHRONIC KIDNEY DISEASE ON CHRONIC DIALYSIS, WITH LONG-TERM CURRENT USE OF INSULIN: Primary | ICD-10-CM

## 2022-02-02 DIAGNOSIS — Z12.11 COLON CANCER SCREENING: Primary | ICD-10-CM

## 2022-02-02 DIAGNOSIS — N18.6 TYPE 2 DIABETES MELLITUS WITH CHRONIC KIDNEY DISEASE ON CHRONIC DIALYSIS, WITH LONG-TERM CURRENT USE OF INSULIN: Primary | ICD-10-CM

## 2022-02-02 DIAGNOSIS — Z79.4 TYPE 2 DIABETES MELLITUS WITH CHRONIC KIDNEY DISEASE ON CHRONIC DIALYSIS, WITH LONG-TERM CURRENT USE OF INSULIN: Primary | ICD-10-CM

## 2022-02-02 DIAGNOSIS — Z99.2 TYPE 2 DIABETES MELLITUS WITH CHRONIC KIDNEY DISEASE ON CHRONIC DIALYSIS, WITH LONG-TERM CURRENT USE OF INSULIN: Primary | ICD-10-CM

## 2022-02-02 NOTE — TELEPHONE ENCOUNTER
----- Message from Debbie Lopez RN sent at 2/2/2022 12:34 PM CST -----  Sorry, Dr Germain, it looks like I wasn't completely clear. I need you to place the referral for Diab Ed, then I can help her get scheduled with those providers. Thank you for ordering her a new glucometer and supplies. Debbie  ----- Message -----  From: Rose Germain MD  Sent: 2/2/2022  12:15 PM CST  To: Debbie Lopez RN    That would be great- thank you!      ----- Message -----  From: Yesica Lombardi MA  Sent: 2/2/2022  10:36 AM CST  To: Rose Germain MD      ----- Message -----  From: Debbie Lopez RN  Sent: 2/2/2022  10:34 AM CST  To: [facility] PO[facility] Staff    Dr Germain,     In addition to the message I sent yesterday about Mrs Monzon reporting she is not checking her blood sugars, she also reported she is not taking her insulin as prescribed. I think it may have to do with her vision issues and needing help from her daughter to draw it up and administer it. If you think she would be a good candidate for a Diab Ed referral I will help her get the Diab NP and RD appts scheduled.     Thank you,     Debbie Lopez RN, [facility]  Outpatient Case Management  [phone]  Ext [phone]  [email or web address]

## 2022-02-02 NOTE — PROGRESS NOTES
Outpatient Care Management  Initial Patient Assessment    Patient: Avril Monzon  MRN: [id number]  Date of Service: 02/01/2022  Completed by: Debbie Lopez RN  Referral Date: 12/20/2021  Program: Case Management (High Risk)    Reason for Visit   Patient presents with    OPCM Enrollment Call    Initial Assessment    PHQ-9    Plan Of Care       Brief Summary:  Avril Monzon was referred by Dr GRANT Wade for T2DM with peripheral neuropathy, ESRD and CHF. Patient qualifies for program based on risk score of 89% with several ongoing uncontrolled disease processes.   Active problem list, medical, surgical and social history reviewed. Active comorbidities include T2DM with peripheral neuropathy, ESRD and CHF and impaired vision. Areas of need identified by patient include needs a new glucometer and needs to see provider she missed appt with today related to ongoing SOB.   Complex care plan created with patient/caregiver input. By next encounter, patient agrees to keep appt with provider if rescheduled for eval of ongoing SOB symptoms, begin to check blood sugars and take all meds as prescribed.     Assessment Documentation     OPCM Initial Assessment    Involvement of Care  Do I have permission to speak with other family members about your care?: Yes  Assessment completed by: Patient  Identified Areas of Need  Advanced Care Planning: Yes  Housing: no  Medication Adherence: Yes  *Active medication list was reviewed and reconciled with patient and/or caregiver:   Nutrition: no  Lab Adherence: no  Depression: No  Communication: no  Health Literacy: no  Fall risk?: No  Equipment/Supplies/Services: yes          Problem List and History     Problems Addressed This Visit    Heart Failure: Identified as current problem  Hypertension: Not identified by patient as current problem  Diabetes: Identified as current problem  Anemia: Not identified by patient as current problem  Chronic Kidney Disease: Not identified by patient as  current problem  Hyperlipidemia: Not identified by patient as current problem  Seizures: Not identified by patient as current problem  Heart Disease: Not identified by patient as current problem         Reviewed medical and social history with patient and/or caregiver. A complex care plan was discussed and completed today, with input from patient and/or caregiver.    Patient Instructions     Instructions were provided via the [employer] patient resources and are available for the patient to view on the patient portal, if active.    Next steps: Follow up in 7-10 days and begin to educate about CHF and DM disease processes, symptoms of exacerbation and measures to control them. Debbie Lopez RN    No follow-ups on file.    Todays OPCM Self-Management Care Plan was developed with the patients/caregivers input and was based on identified barriers from todays assessment.  Goals were written today with the patient/caregiver and the patient has agreed to work towards these goals to improve his/her overall well-being. Patient verbalized understanding of the care plan, goals, and all of today's instructions. Encouraged patient/caregiver to communicate with his/her physician and health care team about health conditions and the treatment plan.  Provided my contact information today and encouraged patient/caregiver to call me with any questions as needed.

## 2022-02-07 ENCOUNTER — TELEPHONE (OUTPATIENT)
Dept: ADMINISTRATIVE | Facility: HOSPITAL | Age: 77
End: 2022-02-07
Payer: MEDICARE

## 2022-02-08 ENCOUNTER — OFFICE VISIT (OUTPATIENT)
Dept: ALLERGY | Facility: CLINIC | Age: 77
End: 2022-02-08
Payer: MEDICARE

## 2022-02-08 ENCOUNTER — OUTPATIENT CASE MANAGEMENT (OUTPATIENT)
Dept: ADMINISTRATIVE | Facility: OTHER | Age: 77
End: 2022-02-08
Payer: MEDICARE

## 2022-02-08 VITALS
SYSTOLIC BLOOD PRESSURE: 141 MMHG | TEMPERATURE: 98 F | HEART RATE: 76 BPM | HEIGHT: 65 IN | DIASTOLIC BLOOD PRESSURE: 71 MMHG | WEIGHT: 133.38 LBS | BODY MASS INDEX: 22.22 KG/M2

## 2022-02-08 DIAGNOSIS — L29.9 ITCHING: ICD-10-CM

## 2022-02-08 PROCEDURE — 1101F PT FALLS ASSESS-DOCD LE1/YR: CPT | Mod: HCNC,CPTII,S$GLB, | Performed by: ALLERGY & IMMUNOLOGY

## 2022-02-08 PROCEDURE — 3077F PR MOST RECENT SYSTOLIC BLOOD PRESSURE >= 140 MM HG: ICD-10-PCS | Mod: HCNC,CPTII,S$GLB, | Performed by: ALLERGY & IMMUNOLOGY

## 2022-02-08 PROCEDURE — 1126F AMNT PAIN NOTED NONE PRSNT: CPT | Mod: HCNC,CPTII,S$GLB, | Performed by: ALLERGY & IMMUNOLOGY

## 2022-02-08 PROCEDURE — 1101F PR PT FALLS ASSESS DOC 0-1 FALLS W/OUT INJ PAST YR: ICD-10-PCS | Mod: HCNC,CPTII,S$GLB, | Performed by: ALLERGY & IMMUNOLOGY

## 2022-02-08 PROCEDURE — 1159F MED LIST DOCD IN RCRD: CPT | Mod: HCNC,CPTII,S$GLB, | Performed by: ALLERGY & IMMUNOLOGY

## 2022-02-08 PROCEDURE — 1159F PR MEDICATION LIST DOCUMENTED IN MEDICAL RECORD: ICD-10-PCS | Mod: HCNC,CPTII,S$GLB, | Performed by: ALLERGY & IMMUNOLOGY

## 2022-02-08 PROCEDURE — 3077F SYST BP >= 140 MM HG: CPT | Mod: HCNC,CPTII,S$GLB, | Performed by: ALLERGY & IMMUNOLOGY

## 2022-02-08 PROCEDURE — 99204 PR OFFICE/OUTPT VISIT, NEW, LEVL IV, 45-59 MIN: ICD-10-PCS | Mod: HCNC,S$GLB,, | Performed by: ALLERGY & IMMUNOLOGY

## 2022-02-08 PROCEDURE — 99999 PR PBB SHADOW E&M-EST. PATIENT-LVL IV: ICD-10-PCS | Mod: PBBFAC,HCNC,, | Performed by: ALLERGY & IMMUNOLOGY

## 2022-02-08 PROCEDURE — 1126F PR PAIN SEVERITY QUANTIFIED, NO PAIN PRESENT: ICD-10-PCS | Mod: HCNC,CPTII,S$GLB, | Performed by: ALLERGY & IMMUNOLOGY

## 2022-02-08 PROCEDURE — 99999 PR PBB SHADOW E&M-EST. PATIENT-LVL IV: CPT | Mod: PBBFAC,HCNC,, | Performed by: ALLERGY & IMMUNOLOGY

## 2022-02-08 PROCEDURE — 99204 OFFICE O/P NEW MOD 45 MIN: CPT | Mod: HCNC,S$GLB,, | Performed by: ALLERGY & IMMUNOLOGY

## 2022-02-08 PROCEDURE — 3078F PR MOST RECENT DIASTOLIC BLOOD PRESSURE < 80 MM HG: ICD-10-PCS | Mod: HCNC,CPTII,S$GLB, | Performed by: ALLERGY & IMMUNOLOGY

## 2022-02-08 PROCEDURE — 3078F DIAST BP <80 MM HG: CPT | Mod: HCNC,CPTII,S$GLB, | Performed by: ALLERGY & IMMUNOLOGY

## 2022-02-08 PROCEDURE — 3288F FALL RISK ASSESSMENT DOCD: CPT | Mod: HCNC,CPTII,S$GLB, | Performed by: ALLERGY & IMMUNOLOGY

## 2022-02-08 PROCEDURE — 3288F PR FALLS RISK ASSESSMENT DOCUMENTED: ICD-10-PCS | Mod: HCNC,CPTII,S$GLB, | Performed by: ALLERGY & IMMUNOLOGY

## 2022-02-08 RX ORDER — TRIAMCINOLONE ACETONIDE 1 MG/G
CREAM TOPICAL 2 TIMES DAILY
Qty: 80 G | Refills: 2 | Status: SHIPPED | OUTPATIENT
Start: 2022-02-08 | End: 2022-10-04

## 2022-02-08 RX ORDER — HYDROXYZINE HYDROCHLORIDE 25 MG/1
25 TABLET, FILM COATED ORAL 3 TIMES DAILY PRN
Qty: 90 TABLET | Refills: 2 | Status: SHIPPED | OUTPATIENT
Start: 2022-02-08 | End: 2022-07-12

## 2022-02-08 NOTE — PROGRESS NOTES
Subjective:       Patient ID: Avril Monzon is a 77 y.o. female.    Chief Complaint:  Itching      HPI: 77 year old female referred with a history of itching.  She reports itching everywhere except for her face. She reports having symptoms for 5 years.  She has tried allergy pills, lotion and vasoline.  She reports that lotion and vasoline help to stop the itch.  She denies a rash.  She is diabetic and on dialysis.  Dove soap  Cream- unsure of the name.      Past Medical History:   Diagnosis Date    Acute hypoxemic respiratory failure 11/26/2018    Anemia     Arthritis     back, hand, knees    Back pain     Cataract     CKD stage G4/A3, GFR 15 - 29 and albumin creatinine ratio >300 mg/g     GFR 40% Jan 2014 and 33% ub 3/2014 ([facility])    Coronary artery disease involving native coronary artery of native heart 11/30/2018    Diabetic retinopathy     DM (diabetes mellitus) type II controlled, neurological manifestation     Exudative age-related macular degeneration of left eye with active choroidal neovascularization 2/5/2019    GERD (gastroesophageal reflux disease)     Glaucoma     Heart failure     Hyperlipidemia     Hypertension     Non-ST elevation MI (NSTEMI) 11/28/2018    NSTEMI (non-ST elevated myocardial infarction) 11/27/2018    Peripheral neuropathy     Polyneuropathy     Proteinuria     >6 mo     Seizures     [facility]G 1/2014 -dick CT NRI showed small vessel    Stroke 2012,2014    Tobacco dependence     Type 2 diabetes with peripheral circulatory disorder, controlled        Family History   Problem Relation Age of Onset    Diabetes Mother     Hyperlipidemia Mother     Hypertension Mother     Heart disease Mother         MI    Muscular dystrophy Son     Cancer Maternal Grandmother         colon       Current Outpatient Medications on File Prior to Visit   Medication Sig Dispense Refill    ACCU-CHEK ARDEN PLUS METER [facility] USE TO TEST TWICE DAILY  0    BIDIL 20-37.5 mg Tab TAKE 2 TABLETS  BY MOUTH THREE TIMES DAILY 180 tablet 3    blood sugar diagnostic Strp 1 strip by Misc.(Non-Drug; Combo Route) route 3 (three) times daily. relion ultima 100 strip 11    blood sugar diagnostic Strp To check BG 2 times daily, to use with insurance preferred meter 100 each 6    blood-glucose meter kit To check BG 2 times daily, to use with insurance preferred meter 1 each 0    cyanocobalamin (VITAMIN B-12) 1000 MCG tablet Take 100 mcg by mouth once daily.      erythromycin (ROMYCIN) ophthalmic ointment PLACE A SMALL AMOUNT INTO LEFT EYE THREE TIMES DAILY AS DIRECTED      folic acid (FOLVITE) 1 MG tablet Take 1 mg by mouth once daily.      insulin NPH/Reg human (HUMULIN, 70/30,) 100 unit/mL (70-30) InPn pen To do 28 units subq in AM and 8 units subq in PM.  Dose to be done within 15 min before or after meal. (Patient taking differently: To do 28 units subq in AM and 8 units subq in PM.  Dose to be done within 15 min before or after meal.) 3 each 1    lancets (ACCU-CHEK SOFTCLIX LANCETS) Misc 1 lancet by Misc.(Non-Drug; Combo Route) route 3 (three) times daily. 100 each 11    lancets Misc To check BG 2 times daily, to use with insurance preferred meter 100 each 6    metoprolol tartrate (LOPRESSOR) 50 MG tablet Take 1 tablet (50 mg total) by mouth 2 (two) times daily. 60 tablet 6    aspirin (ECOTRIN) 81 MG EC tablet Take 1 tablet (81 mg total) by mouth once daily.  0    bumetanide (BUMEX) 1 MG tablet Take 1 tablet (1 mg total) by mouth once daily. for 3 days 3 tablet 0    clopidogreL (PLAVIX) 75 mg tablet Take 1 tablet (75 mg total) by mouth once daily. 60 tablet 0    sevelamer carbonate (RENVELA) 800 mg Tab Take 1 tablet (800 mg total) by mouth 3 (three) times daily with meals. 90 tablet 2     No current facility-administered medications on file prior to visit.       Review of patient's allergies indicates:   Allergen Reactions    Codeine Swelling    Darvon [propoxyphene] Swelling    Atorvastatin       Muscle twitching             Environmental History: Pets in the home: dogs (1). She does not smoke.  Review of Systems   Constitutional: Negative for chills and fever.   HENT: Positive for rhinorrhea. Negative for congestion.    Eyes: Negative for discharge and itching.   Respiratory: Negative for chest tightness, shortness of breath and wheezing.    Cardiovascular: Negative for chest pain and leg swelling.   Gastrointestinal: Negative for nausea and vomiting.   Endocrine: Negative for cold intolerance and heat intolerance.   Skin: Negative for rash.        itching   Allergic/Immunologic: Negative for environmental allergies and food allergies.   Neurological: Negative for facial asymmetry and speech difficulty.   Hematological: Negative for adenopathy. Does not bruise/bleed easily.   Psychiatric/Behavioral: Negative for behavioral problems and suicidal ideas.        Objective:    Physical Exam  Vitals reviewed.   Constitutional:       General: She is not in acute distress.     Appearance: Normal appearance. She is well-developed. She is not ill-appearing, toxic-appearing or diaphoretic.   HENT:      Head: Normocephalic and atraumatic.      Right Ear: Tympanic membrane, ear canal and external ear normal. There is no impacted cerumen.      Left Ear: Tympanic membrane, ear canal and external ear normal. There is no impacted cerumen.      Nose: Nose normal. No congestion or rhinorrhea.      Mouth/Throat:      Pharynx: No oropharyngeal exudate or posterior oropharyngeal erythema.   Eyes:      General: No scleral icterus.        Right eye: No discharge.         Left eye: No discharge.      Pupils: Pupils are equal, round, and reactive to light.   Neck:      Thyroid: No thyromegaly.   Cardiovascular:      Rate and Rhythm: Normal rate and regular rhythm.      Heart sounds: Normal heart sounds. No murmur heard.  No friction rub. No gallop.    Pulmonary:      Effort: Pulmonary effort is normal. No respiratory distress.       Breath sounds: Normal breath sounds. No stridor. No wheezing, rhonchi or rales.   Chest:      Chest wall: No tenderness.   Abdominal:      General: Bowel sounds are normal. There is no distension.      Palpations: Abdomen is soft. There is no mass.      Tenderness: There is no abdominal tenderness. There is no guarding or rebound.      Hernia: No hernia is present.   Musculoskeletal:         General: No swelling, tenderness, deformity or signs of injury. Normal range of motion.      Cervical back: Normal range of motion and neck supple. No rigidity or tenderness. No muscular tenderness.      Right lower leg: No edema.      Left lower leg: No edema.   Lymphadenopathy:      Cervical: No cervical adenopathy.   Skin:     General: Skin is warm.      Coloration: Skin is not jaundiced or pale.      Findings: No bruising or erythema.   Neurological:      Mental Status: She is alert and oriented to person, place, and time.      Gait: Gait normal.   Psychiatric:         Mood and Affect: Mood normal.         Behavior: Behavior normal.         Thought Content: Thought content normal.         Judgment: Judgment normal.           Assessment:       1. Itching         Plan:       Itching  -     Ambulatory referral/consult to Allergy  -     hydrOXYzine HCL (ATARAX) 25 MG tablet; Take 1 tablet (25 mg total) by mouth 3 (three) times daily as needed for Itching.  Dispense: 90 tablet; Refill: 2  -     triamcinolone acetonide 0.1% (KENALOG) 0.1 % cream; Apply topically 2 (two) times daily.  Dispense: 80 g; Refill: 2    Suspect itching is due to ESRD on dialysis and diabetes.   Agree with intense hydration/moisurizing the skin. She was in agreement with the plan.    RTC 6 weeks      MADHAVI PARKS spent a total of 45 minutes on the day of the visit.  This includes face to face time and non-face to face time preparing to see the patient (eg, review of tests), obtaining and/or reviewing separately obtained history, documenting clinical  information in the electronic or other health record, independently interpreting results and communicating results to the patient/family/caregiver, or care coordinator.    CC: Dr. Mejia

## 2022-02-08 NOTE — PROGRESS NOTES
Chart review: conflict with provider appointment, rescheduled contact.   2/15/22, Phoned patient, initiated social assessment. Patient in the process of moving. Both agreed that next Tuesday would be a better time to complete the assessment after the move is complete.   Scheduled social assessment for 2/22/22.

## 2022-02-11 ENCOUNTER — OUTPATIENT CASE MANAGEMENT (OUTPATIENT)
Dept: ADMINISTRATIVE | Facility: OTHER | Age: 77
End: 2022-02-11
Payer: MEDICARE

## 2022-02-11 NOTE — PROGRESS NOTES
Outpatient Care Management  Plan of Care Follow Up Visit    Patient: Avril Monzon  MRN: [id number]  Date of Service: 02/11/2022  Completed by: Debbie Lopez RN  Referral Date: 12/20/2021  Program: Case Management (High Risk)    No chief complaint on file.      Brief Summary: Phone contact and several DM care plan tasks were reviewed. Phone call to [facility] schedulers leaving voice message requesting new pt appt for Mrs Monzon. She agrees to get glucometer at pharmacy and begin checking blood sugars and have this CM follow up with her in about 10 days on a non-dialysis day.     Patient Summary     Involvement of Care:  Do I have permission to speak with other family members about your care?       Patient Reported Labs & Vitals:  1.  Any Patient Reported Labs & Vitals?     2.  Patient Reported Blood Pressure:     3.  Patient Reported Pulse:     4.  Patient Reported Weight (Kg):     5.  Patient Reported Blood Glucose (mg/dl):       Medical and social history was reviewed with patient and/or caregiver.     Clinical Assessment     Reviewed and provided basic information on available community resources for mental health, transportation, wellness resources, and palliative care programs with patient and/or caregiver.     Complex Care Plan     Care plan was discussed and completed today with input from patient and/or caregiver.    Patient Instructions     Instructions were provided via the [employer] patient resources and are available for the patient to view on the patient portal.    Next Steps: Continue to address DM and CHF care plan tasks. Debbie Lopez RN    No follow-ups on file.    Todays OPCM Self-Management Care Plan was developed with the patients/caregivers input and was based on identified barriers from todays assessment.  Goals were written today with the patient/caregiver and the patient has agreed to work towards these goals to improve his/her overall well-being. Patient verbalized understanding of the  care plan, goals, and all of today's instructions. Encouraged patient/caregiver to communicate with his/her physician and health care team about health conditions and the treatment plan.  Provided my contact information today and encouraged patient/caregiver to call me with any questions as needed.

## 2022-02-16 ENCOUNTER — TELEPHONE (OUTPATIENT)
Dept: DIABETES | Facility: CLINIC | Age: 77
End: 2022-02-16
Payer: MEDICARE

## 2022-02-17 ENCOUNTER — OFFICE VISIT (OUTPATIENT)
Dept: CARDIOLOGY | Facility: CLINIC | Age: 77
End: 2022-02-17
Payer: MEDICARE

## 2022-02-17 VITALS
BODY MASS INDEX: 22.27 KG/M2 | WEIGHT: 133.81 LBS | OXYGEN SATURATION: 95 % | SYSTOLIC BLOOD PRESSURE: 134 MMHG | DIASTOLIC BLOOD PRESSURE: 62 MMHG | HEART RATE: 64 BPM

## 2022-02-17 DIAGNOSIS — N18.6 END STAGE RENAL FAILURE ON DIALYSIS: Primary | ICD-10-CM

## 2022-02-17 DIAGNOSIS — Z99.2 ANEMIA IN CHRONIC KIDNEY DISEASE, ON CHRONIC DIALYSIS: ICD-10-CM

## 2022-02-17 DIAGNOSIS — N18.6 ANEMIA IN CHRONIC KIDNEY DISEASE, ON CHRONIC DIALYSIS: ICD-10-CM

## 2022-02-17 DIAGNOSIS — D63.1 ANEMIA IN CHRONIC KIDNEY DISEASE, ON CHRONIC DIALYSIS: ICD-10-CM

## 2022-02-17 DIAGNOSIS — I65.23 BILATERAL CAROTID ARTERY STENOSIS: ICD-10-CM

## 2022-02-17 DIAGNOSIS — I50.32 CHRONIC DIASTOLIC HEART FAILURE: ICD-10-CM

## 2022-02-17 DIAGNOSIS — E11.42 DM TYPE 2 WITH DIABETIC PERIPHERAL NEUROPATHY: ICD-10-CM

## 2022-02-17 DIAGNOSIS — I25.2 HISTORY OF NON-ST ELEVATION MYOCARDIAL INFARCTION (NSTEMI): ICD-10-CM

## 2022-02-17 DIAGNOSIS — Z86.73 HISTORY OF CVA (CEREBROVASCULAR ACCIDENT): ICD-10-CM

## 2022-02-17 DIAGNOSIS — I25.111 CORONARY ARTERY DISEASE INVOLVING NATIVE CORONARY ARTERY OF NATIVE HEART WITH ANGINA PECTORIS WITH DOCUMENTED SPASM: ICD-10-CM

## 2022-02-17 DIAGNOSIS — I36.1 NON-RHEUMATIC TRICUSPID VALVE INSUFFICIENCY: ICD-10-CM

## 2022-02-17 DIAGNOSIS — R07.9 CHEST PAIN, UNSPECIFIED TYPE: ICD-10-CM

## 2022-02-17 DIAGNOSIS — I34.0 NON-RHEUMATIC MITRAL REGURGITATION: ICD-10-CM

## 2022-02-17 DIAGNOSIS — Z99.2 END STAGE RENAL FAILURE ON DIALYSIS: Primary | ICD-10-CM

## 2022-02-17 PROCEDURE — 3078F PR MOST RECENT DIASTOLIC BLOOD PRESSURE < 80 MM HG: ICD-10-PCS | Mod: HCNC,CPTII,S$GLB, | Performed by: INTERNAL MEDICINE

## 2022-02-17 PROCEDURE — 3075F PR MOST RECENT SYSTOLIC BLOOD PRESS GE 130-139MM HG: ICD-10-PCS | Mod: HCNC,CPTII,S$GLB, | Performed by: INTERNAL MEDICINE

## 2022-02-17 PROCEDURE — 1159F MED LIST DOCD IN RCRD: CPT | Mod: HCNC,CPTII,S$GLB, | Performed by: INTERNAL MEDICINE

## 2022-02-17 PROCEDURE — 99999 PR PBB SHADOW E&M-EST. PATIENT-LVL IV: CPT | Mod: PBBFAC,HCNC,, | Performed by: INTERNAL MEDICINE

## 2022-02-17 PROCEDURE — 3288F FALL RISK ASSESSMENT DOCD: CPT | Mod: HCNC,CPTII,S$GLB, | Performed by: INTERNAL MEDICINE

## 2022-02-17 PROCEDURE — 3075F SYST BP GE 130 - 139MM HG: CPT | Mod: HCNC,CPTII,S$GLB, | Performed by: INTERNAL MEDICINE

## 2022-02-17 PROCEDURE — 1160F RVW MEDS BY RX/DR IN RCRD: CPT | Mod: HCNC,CPTII,S$GLB, | Performed by: INTERNAL MEDICINE

## 2022-02-17 PROCEDURE — 3078F DIAST BP <80 MM HG: CPT | Mod: HCNC,CPTII,S$GLB, | Performed by: INTERNAL MEDICINE

## 2022-02-17 PROCEDURE — 3288F PR FALLS RISK ASSESSMENT DOCUMENTED: ICD-10-PCS | Mod: HCNC,CPTII,S$GLB, | Performed by: INTERNAL MEDICINE

## 2022-02-17 PROCEDURE — 99214 PR OFFICE/OUTPT VISIT, EST, LEVL IV, 30-39 MIN: ICD-10-PCS | Mod: HCNC,S$GLB,, | Performed by: INTERNAL MEDICINE

## 2022-02-17 PROCEDURE — 99999 PR PBB SHADOW E&M-EST. PATIENT-LVL IV: ICD-10-PCS | Mod: PBBFAC,HCNC,, | Performed by: INTERNAL MEDICINE

## 2022-02-17 PROCEDURE — 99499 RISK ADDL DX/OHS AUDIT: ICD-10-PCS | Mod: S$GLB,,, | Performed by: INTERNAL MEDICINE

## 2022-02-17 PROCEDURE — 1159F PR MEDICATION LIST DOCUMENTED IN MEDICAL RECORD: ICD-10-PCS | Mod: HCNC,CPTII,S$GLB, | Performed by: INTERNAL MEDICINE

## 2022-02-17 PROCEDURE — 1126F PR PAIN SEVERITY QUANTIFIED, NO PAIN PRESENT: ICD-10-PCS | Mod: HCNC,CPTII,S$GLB, | Performed by: INTERNAL MEDICINE

## 2022-02-17 PROCEDURE — 1101F PT FALLS ASSESS-DOCD LE1/YR: CPT | Mod: HCNC,CPTII,S$GLB, | Performed by: INTERNAL MEDICINE

## 2022-02-17 PROCEDURE — 1101F PR PT FALLS ASSESS DOC 0-1 FALLS W/OUT INJ PAST YR: ICD-10-PCS | Mod: HCNC,CPTII,S$GLB, | Performed by: INTERNAL MEDICINE

## 2022-02-17 PROCEDURE — 1126F AMNT PAIN NOTED NONE PRSNT: CPT | Mod: HCNC,CPTII,S$GLB, | Performed by: INTERNAL MEDICINE

## 2022-02-17 PROCEDURE — 99499 UNLISTED E&M SERVICE: CPT | Mod: S$GLB,,, | Performed by: INTERNAL MEDICINE

## 2022-02-17 PROCEDURE — 1160F PR REVIEW ALL MEDS BY PRESCRIBER/CLIN PHARMACIST DOCUMENTED: ICD-10-PCS | Mod: HCNC,CPTII,S$GLB, | Performed by: INTERNAL MEDICINE

## 2022-02-17 PROCEDURE — 3052F HG A1C>EQUAL 8.0%<EQUAL 9.0%: CPT | Mod: HCNC,CPTII,S$GLB, | Performed by: INTERNAL MEDICINE

## 2022-02-17 PROCEDURE — 3052F PR MOST RECENT HEMOGLOBIN A1C LEVEL 8.0 - < 9.0%: ICD-10-PCS | Mod: HCNC,CPTII,S$GLB, | Performed by: INTERNAL MEDICINE

## 2022-02-17 PROCEDURE — 99214 OFFICE O/P EST MOD 30 MIN: CPT | Mod: HCNC,S$GLB,, | Performed by: INTERNAL MEDICINE

## 2022-02-17 NOTE — PROGRESS NOTES
Subjective:   Patient ID:  Avril Monzon is a 77 y.o. female who presents for cardiac consult of No chief complaint on file.      Palpitations   Associated symptoms include malaise/fatigue. Pertinent negatives include no chest pain or shortness of breath.   Chest Pain   Associated symptoms include malaise/fatigue. Pertinent negatives include no palpitations or shortness of breath.     The patient came in today for cardiac consult of No chief complaint on file.    Avril Monzon is a 77 y.o. female pt with  CAD s/p previous NSTEMI with PCI of LAD in 12/18, HTN, HLD, DM type II, former tobacco abuse, ESRD on HD, anemia, history of CVA, Carotid artery disease, here for CV follow up.     Avril Monzon is a 77 y.o. female  admitted to ICU on NIV with a dx of acute on chronic systolic and diastolic CHF exacerbation , Volume overload , Uncontrolled HTN , Acute hypoxic respiratory failure  And CKD stage 5 . She was started on NIV , NTG drip and lasix drip with and improvement of her sx . She is (-) 2.3 lt since admission . She is wean off  BIPAP .   She cont on NTG drip and lasix drip . The BP has improved  . Her home BP medication were resume .   She has improved with IV diuresis, cardiology consulted for CHF. Discussed will need volume removal and will work up pulm HTN as outpt.    7/4/20 - changed to lasix 80 with BIdil, pt feels well ready for DC today. NO acute events overnight. Will follow up in CV clinic    7/20/20 - Hosp follow up  ECHO in hosp with elevated PA pressures, with mild to moderate TR/MR. She feels well, did not need oxygen at night. Swelling is stable/improved. No CP. Is active at home.     8/28/20  She has been feeling better but has been having more palpitations x 1 week. Stable dyspnea no chest pain. She feels more tired at times. BP stable at home, is on Torsemide now.    10/13/20  Holter with occ PVCs, freq PACs, AVG HR 72. No further palpitations.  BP elevated today 176/60. She also has pain in  right shoulder blade, going to right breast. She slept with arm above head once.     11/17/20  BP low today, she went to ER yesterday and received IVF. She felt fatigue, not lightheaded and was dyspneic. She started HD about a month ago. Will titrate and adjust meds.   She will receive blood today.     3/9/21  Mild to moderate carotid artery disease in 10/2020. Pt unsure of some of her meds, daughter isn't present. occ BP low 100s. She has back pain since fall a few years ago, gets tired/back pain when standing. No CP/SOB. She had both COVID vaccines.     3/26/21 - ER follow up    2 weeks ago - from notes  yesterday had dialysis- completed regularly. BP is elevated--this am: 190s/60s(possibly 189/68); has taken am metoprolol. Has continued post dialysis headache- took BC powder like med for headache- this normally relieves and it has not relieved headache.BP last night: 170s/60s last night. Took metoprolol 25 mg at bedtime.No black or bloody stools or blood in urine, no unusual bruising access site looks wdl.   Recheck [id number]: 215/65 advised daughter to bring pt to ED now for evaluation.    ER visit c/o elevated BP. Pt with hx of ESRD on HD M-W-F. She states this morning when she woke up at 0400 and her BP was 170 systolic. She states around 0900 this morning her BP was 190 systolic. She states she took her blood pressure medicine and came here. Pt states she has a slight headache but it is resolving.    BP in ER was normal, headache resolved.     7/2/21  Increased BB last visit. BP and HR well controlled today. She had missed BB for a few days her  had colon CA and surgery has been in hospital for a month so far. She has more back pain lately as well.   ECG - NSR, LAE    9/15/21  She has had issues getting to see her pain doc as she missed appts/changed appts due to availability. She has been having itching issues - she went to derm, possible had some allergies. BP and HR well controlled today.      12/14/21    [facility] - presents to the Emergency Department for SOB, onset last night. Associated with orthopnea, nonproductive cough and wheezing. Pt dialyzes every M/W/F, and last dialyzed 3 days ago (Friday). Pt has not yet dialyzed today due to her sxs. Denies recent fever, chills, n/v/d, CP, weakness, numbness, dizziness, headache, and all other sxs at this time  Hospital Course:   Patient admitted to noncompliance with diet and increased Na intake over the weekend. She received dialysis yesterday and denies feeling SOB. Trop trended and case discussed with cards. Pateint denies CP. All home meds continued except for lisinopril as she reports being taken off. Plavix and statin scripts refilled She will get dialyzed again tomorrow MWF. In stable condition at time of discharge and patient agreeable with plan. Instructed to follow up with PCP for post hospital visit stay.     Elevated troponin in setting of HTN urgency and Acute CHF  No need for IV heparin gtt since chest pain free  Trend serial troponin  Check ECHO  Optimize meds for better BP control  Nephro consult for HD needs during admission  Continue ASA Statin Plavix Bidil, BB ACei  Today pt feels well, BP and HR stable.     2/17/22  Nuclear stress neg for ischemia, Scar noted. EF normal. No CP, has chronic ALVARADO with fatigue, stable. She had BNP in dec > 2K, has been getting more HD off lately.     Patient feels  no chest pain, no PND, no dizziness, no syncope, no CNS symptoms.    Patient has fairly good exercise tolerance.    Patient is compliant with medications.     Results for orders placed during the hospital encounter of 01/04/22    Nuclear Stress - Cardiology Interpreted    Interpretation Summary    Abnormal myocardial perfusion scan.    There is a severe intensity, moderate to large sized fixed defect consistent with scar in the anteroapical wall(s).    The gated perfusion images showed an ejection fraction of 64% at rest. The gated perfusion  images showed an ejection fraction of 65% post stress.    The EKG portion of this study is negative for ischemia.    During stress, rare PVCs are noted.      Carotid u/s  Conclusion    · There is 20-39% right Internal Carotid Stenosis.  · There is 40-49% left Internal Carotid Stenosis        Results for orders placed during the hospital encounter of 12/06/21    Echo    Interpretation Summary  · The left ventricle is normal in size with normal systolic function.  · The estimated ejection fraction is 60%.  · Grade II left ventricular diastolic dysfunction.  · The estimated PA systolic pressure is 75 mmHg.  · Normal right ventricular size with normal right ventricular systolic function.  · There is pulmonary hypertension.  · Intermediate central venous pressure (8 mmHg).  · Moderate mitral regurgitation.  · Moderate tricuspid regurgitation.  · Mild pulmonic regurgitation.      HOLTER 8/13/20  Predominant Rhythm  Sinus rhythm with heart rates varying between 57 and 96 bpm with an average of 72 bpm.   PVC    Ventricular Arrhythmias  There were occasional PVCs totalling 1078 and averaging 11.23 per hour.   PAC    Supraventricular Arrhythmias  There were frequent PACs totalling 6784 and averaging 70.67 per hour.         Past Medical History:   Diagnosis Date    Acute hypoxemic respiratory failure 11/26/2018    Anemia     Arthritis     back, hand, knees    Back pain     Cataract     CKD stage G4/A3, GFR 15 - 29 and albumin creatinine ratio >300 mg/g     GFR 40% Jan 2014 and 33% ub 3/2014 ([facility])    Coronary artery disease involving native coronary artery of native heart 11/30/2018    Diabetic retinopathy     DM (diabetes mellitus) type II controlled, neurological manifestation     Exudative age-related macular degeneration of left eye with active choroidal neovascularization 2/5/2019    GERD (gastroesophageal reflux disease)     Glaucoma     Heart failure     Hyperlipidemia     Hypertension     Non-ST  elevation MI (NSTEMI) 2018    NSTEMI (non-ST elevated myocardial infarction) 2018    Peripheral neuropathy     Polyneuropathy     Proteinuria     >6 mo     Seizures     [facility] 2014 -dick CT NRI showed small vessel    Stroke     Tobacco dependence     Type 2 diabetes with peripheral circulatory disorder, controlled        Past Surgical History:   Procedure Laterality Date    BREAST LUMPECTOMY Left     benign, when patient was 15 years old    BREAST MASS EXCISION      ,benign, age 15.    CATHETERIZATION OF BOTH LEFT AND RIGHT HEART N/A 2018    Procedure: CATHETERIZATION, HEART, BOTH LEFT AND RIGHT;  Surgeon: Michelle Holman MD;  Location: [facility] CATH LAB;  Service: Cardiology;  Laterality: N/A;    CATHETERIZATION OF BOTH LEFT AND RIGHT HEART N/A 2018    Procedure: CATHETERIZATION, HEART, BOTH LEFT AND RIGHT;  Surgeon: Michelle Holman MD;  Location: [facility] CATH LAB;  Service: Cardiology;  Laterality: N/A;    HYSTERECTOMY  1982    INSERTION OF SHUNT      LEFT HEART CATHETERIZATION Left 12/3/2018    Procedure: CATHETERIZATION, HEART, LEFT;  Surgeon: Michelle Holman MD;  Location: [facility] CATH LAB;  Service: Cardiology;  Laterality: Left;  LAD ATHERECTOMY STENT/ FEMORAL APPROACH    OOPHORECTOMY      wrist cyst Right 1980s    dorsal wrist cyst       Social History     Tobacco Use    Smoking status: Former Smoker     Packs/day: 1.50     Years: 30.00     Pack years: 45.00     Types: Cigarettes     Quit date: 1990     Years since quittin.1    Smokeless tobacco: Former User   Substance Use Topics    Alcohol use: No     Alcohol/week: 0.0 standard drinks    Drug use: No       Family History   Problem Relation Age of Onset    Diabetes Mother     Hyperlipidemia Mother     Hypertension Mother     Heart disease Mother         MI    Muscular dystrophy Son     Cancer Maternal Grandmother         colon       Patient's Medications   New Prescriptions    No medications on file    Previous Medications    ACCU-CHEK ARDEN PLUS METER MISC    USE TO TEST TWICE DAILY    ASPIRIN (ECOTRIN) 81 MG EC TABLET    Take 1 tablet (81 mg total) by mouth once daily.    BIDIL 20-37.5 MG TAB    TAKE 2 TABLETS BY MOUTH THREE TIMES DAILY    BLOOD SUGAR DIAGNOSTIC STRP    1 strip by Misc.(Non-Drug; Combo Route) route 3 (three) times daily. relion ultima    BLOOD SUGAR DIAGNOSTIC STRP    To check BG 2 times daily, to use with insurance preferred meter    BLOOD-GLUCOSE METER KIT    To check BG 2 times daily, to use with insurance preferred meter    CLOPIDOGREL (PLAVIX) 75 MG TABLET    Take 1 tablet (75 mg total) by mouth once daily.    CYANOCOBALAMIN (VITAMIN B-12) 1000 MCG TABLET    Take 100 mcg by mouth once daily.    ERYTHROMYCIN (ROMYCIN) OPHTHALMIC OINTMENT    PLACE A SMALL AMOUNT INTO LEFT EYE THREE TIMES DAILY AS DIRECTED    FOLIC ACID (FOLVITE) 1 MG TABLET    Take 1 mg by mouth once daily.    HYDROXYZINE HCL (ATARAX) 25 MG TABLET    Take 1 tablet (25 mg total) by mouth 3 (three) times daily as needed for Itching.    INSULIN NPH/REG HUMAN (HUMULIN, 70/30,) 100 UNIT/ML (70-30) INPN PEN    To do 28 units subq in AM and 8 units subq in PM.  Dose to be done within 15 min before or after meal.    LANCETS (ACCU-CHEK SOFTCLIX LANCETS) MISC    1 lancet by Misc.(Non-Drug; Combo Route) route 3 (three) times daily.    LANCETS MIS    To check BG 2 times daily, to use with insurance preferred meter    METOPROLOL TARTRATE (LOPRESSOR) 50 MG TABLET    Take 1 tablet (50 mg total) by mouth 2 (two) times daily.    SEVELAMER CARBONATE (RENVELA) 800 MG TAB    Take 1 tablet (800 mg total) by mouth 3 (three) times daily with meals.    TRIAMCINOLONE ACETONIDE 0.1% (KENALOG) 0.1 % CREAM    Apply topically 2 (two) times daily.   Modified Medications    No medications on file   Discontinued Medications    BUMETANIDE (BUMEX) 1 MG TABLET    Take 1 tablet (1 mg total) by mouth once daily. for 3 days       Review of Systems    Constitutional: Positive for malaise/fatigue.   HENT: Negative.    Eyes: Negative.    Respiratory: Negative for shortness of breath.    Cardiovascular: Negative for chest pain, palpitations and leg swelling.   Gastrointestinal: Negative.    Genitourinary: Negative.    Musculoskeletal: Negative.    Skin: Negative.    Neurological: Negative.    Endo/Heme/Allergies: Negative.    Psychiatric/Behavioral: Negative.    All 12 systems otherwise negative.      Wt Readings from Last 3 Encounters:   02/17/22 60.7 kg (133 lb 13.1 oz)   02/08/22 60.5 kg (133 lb 6.1 oz)   12/27/21 64.2 kg (141 lb 8.6 oz)     Temp Readings from Last 3 Encounters:   02/08/22 97.7 °F (36.5 °C) (Temporal)   12/27/21 97.9 °F (36.6 °C)   12/21/21 98.1 °F (36.7 °C) (Oral)     BP Readings from Last 3 Encounters:   02/17/22 134/62   02/08/22 (!) 141/71   12/27/21 (!) 140/78     Pulse Readings from Last 3 Encounters:   02/17/22 64   02/08/22 76   12/27/21 72       /62 (BP Location: Right arm, Patient Position: Sitting)   Pulse 64   Wt 60.7 kg (133 lb 13.1 oz)   LMP  (LMP Unknown)   SpO2 95%   BMI 22.27 kg/m²     Objective:   Physical Exam  Vitals and nursing note reviewed.   Constitutional:       General: She is not in acute distress.     Appearance: She is well-developed. She is not diaphoretic.   HENT:      Head: Normocephalic and atraumatic.      Nose: Nose normal.   Eyes:      General: No scleral icterus.     Conjunctiva/sclera: Conjunctivae normal.   Neck:      Thyroid: No thyromegaly.      Vascular: No JVD.   Cardiovascular:      Rate and Rhythm: Normal rate and regular rhythm.      Heart sounds: S1 normal and S2 normal. Murmur heard.   No friction rub. No gallop. No S3 or S4 sounds.    Pulmonary:      Effort: Pulmonary effort is normal. No respiratory distress.      Breath sounds: Normal breath sounds. No stridor. No wheezing or rales.   Chest:      Chest wall: No tenderness.   Abdominal:      General: Bowel sounds are normal. There is  no distension.      Palpations: Abdomen is soft. There is no mass.      Tenderness: There is no abdominal tenderness. There is no rebound.   Genitourinary:     Comments: Deferred  Musculoskeletal:         General: No tenderness or deformity. Normal range of motion.      Cervical back: Normal range of motion and neck supple.   Lymphadenopathy:      Cervical: No cervical adenopathy.   Skin:     General: Skin is warm and dry.      Coloration: Skin is not pale.      Findings: No erythema or rash.   Neurological:      Mental Status: She is alert and oriented to person, place, and time.      Motor: No abnormal muscle tone.      Coordination: Coordination normal.   Psychiatric:         Behavior: Behavior normal.         Thought Content: Thought content normal.         Judgment: Judgment normal.         Lab Results   Component Value Date     12/21/2021    K 4.5 12/21/2021     12/21/2021    CO2 21 (L) 12/21/2021    BUN 25 (H) 12/21/2021    CREATININE 4.3 (H) 12/21/2021     (H) 12/21/2021    HGBA1C 8.6 (H) 10/19/2021    HGBA1C 7.3 04/26/2021    MG 1.9 10/22/2020    AST 14 12/20/2021    ALT 17 12/20/2021    ALBUMIN 3.4 (L) 12/20/2021    PROT 6.6 12/20/2021    BILITOT 0.3 12/20/2021    WBC 8.22 12/20/2021    HGB 8.0 (L) 12/20/2021    HCT 24.9 (L) 12/20/2021     (H) 12/20/2021     12/20/2021    INR 0.9 06/15/2020    TSH 1.792 01/26/2021    CHOL 246 (H) 10/19/2021    HDL 62 10/19/2021    LDLCALC 159.6 (H) 10/19/2021    TRIG 122 10/19/2021    BNP 2,742 (H) 12/20/2021     Assessment:      1. End stage renal failure on dialysis    2. Chest pain, unspecified type    3. Chronic diastolic heart failure    4. Non-rheumatic mitral regurgitation    5. Non-rheumatic tricuspid valve insufficiency    6. Coronary artery disease involving native coronary artery of native heart with angina pectoris with documented spasm    7. Bilateral carotid artery stenosis    8. History of CVA (cerebrovascular accident)    9.  History of non-ST elevation myocardial infarction (NSTEMI)    10. Anemia in chronic kidney disease, on chronic dialysis    11. DM type 2 with diabetic peripheral neuropathy        Plan:   1. HFpEF with mild to mod MR/TR; BNP 2742  - was on torsemide and bumex but rec HD for volume removal   - monitor BP and weights  - low salt diet  - rec compression stockings    2. CAD s/p PCI of LAD 12/2018; h/o NSTEMI with elevated trop sec to demand  - 1/2022 Nuclear stress neg for ischemia, Scar noted. EF normal.   - cont asa, plavix, statin, BB  - stable    3. HTN - stable  - cont meds and titrate   - cont Bidil    4. Carotid artery stenosis , mild to mod  - cont to monitor  - cont asa, statin  - stable on u/s 9/2021    5. ESRD on HD with anemia  - cont HD    6. H/o CVA   - cont asa, statin, plavix  - refer to neuro     7. Palpitations  - Holter -  occ PVCs, freq PACs, AVG HR 72   - cont BB - increased BB to 50 mg BID  - dec caffeine     8. Anemia  - s/p blood  - procrit with HD    9. Back pain  - f/u pain management    10. Itching/allergies?  - refer to allergies, f/u derm    11. DM2 A1c 8.6  - cont tx  - may start new meds      Thank you for allowing me to participate in this patient's care. Please do not hesitate to contact me with any questions or concerns. Consult note has been forwarded to the referral physician.

## 2022-02-18 ENCOUNTER — TELEPHONE (OUTPATIENT)
Dept: FAMILY MEDICINE | Facility: CLINIC | Age: 77
End: 2022-02-18

## 2022-02-18 NOTE — TELEPHONE ENCOUNTER
Cover my Meds sent over a PA that stated that Ms. Monzon insurance does not cover the : HUMULIN 70/30 KWIKPEN 100 UNIT/ML PEN INJECTORS. Just wanted to inform you in case you wanted to send in something her insurance covers. Please advise

## 2022-02-20 NOTE — TELEPHONE ENCOUNTER
This rx was sent last December ---- did she not get it filled at that time?  This is a chronic med/rx.

## 2022-02-22 ENCOUNTER — OUTPATIENT CASE MANAGEMENT (OUTPATIENT)
Dept: ADMINISTRATIVE | Facility: OTHER | Age: 77
End: 2022-02-22
Payer: MEDICARE

## 2022-02-22 NOTE — LETTER
March 1, 2022    Avril Monzon  [address] [place] [address]             [facility] [address] [place] [address] Dear Ms. Monzon:    I am writing from the Outpatient Complex Care Management Department at [facility].  I received a referral from Debbie Lopez RN, to contact you regarding any needs you may have.  I have been unable to reach you by phone.   Please contact me if you would like to discuss any needs you may have.    I can be reached at [phone] Monday thru Friday from 8:00am to 4:30pm.  [facility] also has a program with a nurse available 24/7 to answer questions or provide medical advice.  [facility] can be reached at [phone].    Sincerely,         Page Sky LCSW

## 2022-02-22 NOTE — PROGRESS NOTES
Outpatient Care Management  Plan of Care Follow Up Visit    Patient: Avril Monzon  MRN: [id number]  Date of Service: 02/22/2022  Completed by: Debbie Lopez RN  Referral Date: 12/20/2021  Program: Case Management (High Risk)    Reason for Visit   Patient presents with    Update Plan Of Care       Brief Summary: Phone contact made with Mrs. Monzon's daughter and several DM care plan tasks were reviewed. After contact Diab NP appt was scheduled for Mrs Monzon and a voice mail was left on Diab RD schedulers line requesting a RN appt.     Patient Summary     Involvement of Care:  Do I have permission to speak with other family members about your care?       Patient Reported Labs & Vitals:  1.  Any Patient Reported Labs & Vitals?     2.  Patient Reported Blood Pressure:     3.  Patient Reported Pulse:     4.  Patient Reported Weight (Kg):     5.  Patient Reported Blood Glucose (mg/dl):       Medical and social history was reviewed with patient and/or caregiver.     Clinical Assessment     Reviewed and provided basic information on available community resources for mental health, transportation, wellness resources, and palliative care programs with patient and/or caregiver.     Complex Care Plan     Care plan was discussed and completed today with input from patient and/or caregiver.    Patient Instructions     Instructions were provided via the [employer] patient resources and are available for the patient to view on the patient portal.    Next Steps: Continue to educate Mrs Monzon and her family CG's about DM and CHF care plan tasks. Debbie Lopez RN    No follow-ups on file.    Todays OPCM Self-Management Care Plan was developed with the patients/caregivers input and was based on identified barriers from todays assessment.  Goals were written today with the patient/caregiver and the patient has agreed to work towards these goals to improve his/her overall well-being. Patient verbalized understanding of the care  plan, goals, and all of today's instructions. Encouraged patient/caregiver to communicate with his/her physician and health care team about health conditions and the treatment plan.  Provided my contact information today and encouraged patient/caregiver to call me with any questions as needed.

## 2022-03-03 ENCOUNTER — OUTPATIENT CASE MANAGEMENT (OUTPATIENT)
Dept: ADMINISTRATIVE | Facility: OTHER | Age: 77
End: 2022-03-03
Payer: MEDICARE

## 2022-03-03 NOTE — PROGRESS NOTES
Outpatient Care Management  Plan of Care Follow Up Visit    Patient: Avril Monzon  MRN: [id number]  Date of Service: 03/03/2022  Completed by: Debbie Lopez RN  Referral Date: 12/20/2021  Program: Case Management (High Risk)    Reason for Visit   Patient presents with    Update Plan Of Care       Brief Summary: Phone contact today with pt's daughter, Elizabeth, briefly as she was taking another family member to an appt. She agreed to assist pt to attend her appts on 3/8/22 and to follow up contact from this CM in about 10 days.     Patient Summary     Involvement of Care:  Do I have permission to speak with other family members about your care?       Patient Reported Labs & Vitals:  1.  Any Patient Reported Labs & Vitals?     2.  Patient Reported Blood Pressure:     3.  Patient Reported Pulse:     4.  Patient Reported Weight (Kg):     5.  Patient Reported Blood Glucose (mg/dl):       Medical and social history was reviewed with patient and/or caregiver.     Clinical Assessment     Reviewed and provided basic information on available community resources for mental health, transportation, wellness resources, and palliative care programs with patient and/or caregiver.     Complex Care Plan     Care plan was discussed and completed today with input from patient and/or caregiver.    Patient Instructions     Instructions were provided via the [email or web address] patient resources and are available for the patient to view on the patient portal.    Next Steps: Continue to discuss DM and CHF and educate about how to manage both. Debbie Lopez RN    No follow-ups on file.    Todays OPCM Self-Management Care Plan was developed with the patients/caregivers input and was based on identified barriers from todays assessment.  Goals were written today with the patient/caregiver and the patient has agreed to work towards these goals to improve his/her overall well-being. Patient verbalized understanding of the care plan, goals, and all of  today's instructions. Encouraged patient/caregiver to communicate with his/her physician and health care team about health conditions and the treatment plan.  Provided my contact information today and encouraged patient/caregiver to call me with any questions as needed.     (3) walks occasionally

## 2022-03-08 ENCOUNTER — TELEPHONE (OUTPATIENT)
Dept: FAMILY MEDICINE | Facility: CLINIC | Age: 77
End: 2022-03-08
Payer: MEDICARE

## 2022-03-08 ENCOUNTER — LAB VISIT (OUTPATIENT)
Dept: LAB | Facility: HOSPITAL | Age: 77
End: 2022-03-08
Attending: INTERNAL MEDICINE
Payer: MEDICARE

## 2022-03-08 ENCOUNTER — OFFICE VISIT (OUTPATIENT)
Dept: DIABETES | Facility: CLINIC | Age: 77
End: 2022-03-08
Payer: MEDICARE

## 2022-03-08 VITALS
HEART RATE: 65 BPM | HEIGHT: 65 IN | WEIGHT: 130.94 LBS | DIASTOLIC BLOOD PRESSURE: 69 MMHG | SYSTOLIC BLOOD PRESSURE: 139 MMHG | BODY MASS INDEX: 21.81 KG/M2

## 2022-03-08 DIAGNOSIS — I15.2 HYPERTENSION ASSOCIATED WITH DIABETES: Chronic | ICD-10-CM

## 2022-03-08 DIAGNOSIS — E78.5 HYPERLIPIDEMIA ASSOCIATED WITH TYPE 2 DIABETES MELLITUS: ICD-10-CM

## 2022-03-08 DIAGNOSIS — E11.65 HYPERGLYCEMIA DUE TO DIABETES MELLITUS: ICD-10-CM

## 2022-03-08 DIAGNOSIS — E11.59 HYPERTENSION ASSOCIATED WITH DIABETES: Chronic | ICD-10-CM

## 2022-03-08 DIAGNOSIS — Z79.4 TYPE 2 DIABETES MELLITUS WITH STABLE PROLIFERATIVE RETINOPATHY OF BOTH EYES, WITH LONG-TERM CURRENT USE OF INSULIN: ICD-10-CM

## 2022-03-08 DIAGNOSIS — E11.3553 TYPE 2 DIABETES MELLITUS WITH STABLE PROLIFERATIVE RETINOPATHY OF BOTH EYES, WITH LONG-TERM CURRENT USE OF INSULIN: Primary | ICD-10-CM

## 2022-03-08 DIAGNOSIS — Z86.73 HISTORY OF CVA (CEREBROVASCULAR ACCIDENT): ICD-10-CM

## 2022-03-08 DIAGNOSIS — E11.69 HYPERLIPIDEMIA ASSOCIATED WITH TYPE 2 DIABETES MELLITUS: ICD-10-CM

## 2022-03-08 DIAGNOSIS — Z79.4 TYPE 2 DIABETES MELLITUS WITH STABLE PROLIFERATIVE RETINOPATHY OF BOTH EYES, WITH LONG-TERM CURRENT USE OF INSULIN: Primary | ICD-10-CM

## 2022-03-08 DIAGNOSIS — N18.6 ESRD (END STAGE RENAL DISEASE): ICD-10-CM

## 2022-03-08 DIAGNOSIS — E11.3553 TYPE 2 DIABETES MELLITUS WITH STABLE PROLIFERATIVE RETINOPATHY OF BOTH EYES, WITH LONG-TERM CURRENT USE OF INSULIN: ICD-10-CM

## 2022-03-08 LAB
ESTIMATED AVG GLUCOSE: 263 MG/DL (ref 68–131)
HBA1C MFR BLD: 10.8 % (ref 4–5.6)

## 2022-03-08 PROCEDURE — 3052F HG A1C>EQUAL 8.0%<EQUAL 9.0%: CPT | Mod: CPTII,S$GLB,, | Performed by: NURSE PRACTITIONER

## 2022-03-08 PROCEDURE — 99499 RISK ADDL DX/OHS AUDIT: ICD-10-PCS | Mod: HCNC,S$GLB,, | Performed by: NURSE PRACTITIONER

## 2022-03-08 PROCEDURE — 99999 PR PBB SHADOW E&M-EST. PATIENT-LVL IV: ICD-10-PCS | Mod: PBBFAC,,, | Performed by: NURSE PRACTITIONER

## 2022-03-08 PROCEDURE — 1126F AMNT PAIN NOTED NONE PRSNT: CPT | Mod: CPTII,S$GLB,, | Performed by: NURSE PRACTITIONER

## 2022-03-08 PROCEDURE — 99205 PR OFFICE/OUTPT VISIT, NEW, LEVL V, 60-74 MIN: ICD-10-PCS | Mod: S$GLB,,, | Performed by: NURSE PRACTITIONER

## 2022-03-08 PROCEDURE — 3288F FALL RISK ASSESSMENT DOCD: CPT | Mod: CPTII,S$GLB,, | Performed by: NURSE PRACTITIONER

## 2022-03-08 PROCEDURE — 1101F PR PT FALLS ASSESS DOC 0-1 FALLS W/OUT INJ PAST YR: ICD-10-PCS | Mod: CPTII,S$GLB,, | Performed by: NURSE PRACTITIONER

## 2022-03-08 PROCEDURE — 1159F PR MEDICATION LIST DOCUMENTED IN MEDICAL RECORD: ICD-10-PCS | Mod: CPTII,S$GLB,, | Performed by: NURSE PRACTITIONER

## 2022-03-08 PROCEDURE — 99205 OFFICE O/P NEW HI 60 MIN: CPT | Mod: S$GLB,,, | Performed by: NURSE PRACTITIONER

## 2022-03-08 PROCEDURE — 3075F SYST BP GE 130 - 139MM HG: CPT | Mod: CPTII,S$GLB,, | Performed by: NURSE PRACTITIONER

## 2022-03-08 PROCEDURE — 1126F PR PAIN SEVERITY QUANTIFIED, NO PAIN PRESENT: ICD-10-PCS | Mod: CPTII,S$GLB,, | Performed by: NURSE PRACTITIONER

## 2022-03-08 PROCEDURE — 1101F PT FALLS ASSESS-DOCD LE1/YR: CPT | Mod: CPTII,S$GLB,, | Performed by: NURSE PRACTITIONER

## 2022-03-08 PROCEDURE — 3078F DIAST BP <80 MM HG: CPT | Mod: CPTII,S$GLB,, | Performed by: NURSE PRACTITIONER

## 2022-03-08 PROCEDURE — 3288F PR FALLS RISK ASSESSMENT DOCUMENTED: ICD-10-PCS | Mod: CPTII,S$GLB,, | Performed by: NURSE PRACTITIONER

## 2022-03-08 PROCEDURE — 3078F PR MOST RECENT DIASTOLIC BLOOD PRESSURE < 80 MM HG: ICD-10-PCS | Mod: CPTII,S$GLB,, | Performed by: NURSE PRACTITIONER

## 2022-03-08 PROCEDURE — 1160F RVW MEDS BY RX/DR IN RCRD: CPT | Mod: CPTII,S$GLB,, | Performed by: NURSE PRACTITIONER

## 2022-03-08 PROCEDURE — 1160F PR REVIEW ALL MEDS BY PRESCRIBER/CLIN PHARMACIST DOCUMENTED: ICD-10-PCS | Mod: CPTII,S$GLB,, | Performed by: NURSE PRACTITIONER

## 2022-03-08 PROCEDURE — 83036 HEMOGLOBIN GLYCOSYLATED A1C: CPT | Performed by: NURSE PRACTITIONER

## 2022-03-08 PROCEDURE — 3075F PR MOST RECENT SYSTOLIC BLOOD PRESS GE 130-139MM HG: ICD-10-PCS | Mod: CPTII,S$GLB,, | Performed by: NURSE PRACTITIONER

## 2022-03-08 PROCEDURE — 36415 COLL VENOUS BLD VENIPUNCTURE: CPT | Performed by: NURSE PRACTITIONER

## 2022-03-08 PROCEDURE — 1159F MED LIST DOCD IN RCRD: CPT | Mod: CPTII,S$GLB,, | Performed by: NURSE PRACTITIONER

## 2022-03-08 PROCEDURE — 99999 PR PBB SHADOW E&M-EST. PATIENT-LVL IV: CPT | Mod: PBBFAC,,, | Performed by: NURSE PRACTITIONER

## 2022-03-08 PROCEDURE — 3052F PR MOST RECENT HEMOGLOBIN A1C LEVEL 8.0 - < 9.0%: ICD-10-PCS | Mod: CPTII,S$GLB,, | Performed by: NURSE PRACTITIONER

## 2022-03-08 PROCEDURE — 99499 UNLISTED E&M SERVICE: CPT | Mod: HCNC,S$GLB,, | Performed by: NURSE PRACTITIONER

## 2022-03-08 NOTE — TELEPHONE ENCOUNTER
----- Message from Natty Mistry NP sent at 3/8/2022  3:14 PM CST -----  Please call patient and follow up on BG reading after am insulin dose. Please document.

## 2022-03-08 NOTE — TELEPHONE ENCOUNTER
----- Message from Danae Rodriguez sent at 3/8/2022  4:14 PM CST -----  Who Called: Patient    What is the reqeust in detail: Requesting call back to discuss the reschedule to 03/15/22 appt. Patient was understanding that her 03/08/22 appointment was to be rescheduled to 03/15/22. Please advise.     Can the clinic reply by [facility]? No    Best Call Back Number: [phone]    Additional Information:

## 2022-03-08 NOTE — Clinical Note
Pt was instructed to take her morning dose of insulin when she gets home and check her BG 2 hrs after administration. Please call patient midafternoon, 2-3 pm to check on her BG. Please document.

## 2022-03-08 NOTE — TELEPHONE ENCOUNTER
Spoke with Patient. She has not taken her Blood Glucose today because she stated she has not took insulin because she has not found her pens.

## 2022-03-08 NOTE — PATIENT INSTRUCTIONS
Medication Regimen/Changes:   - Continue Humulin 70/30 28 units in am and 8 units in the evening    Patient Instructions:  Carbohydrate Count: 30-45 grams/meal and 15 grams/snack with limit of 2 snacks per day.  Exercise: Goal is 150 minutes or more per week.  Bring meter and blood sugar log to each appointment.     Goals for Blood Sugar:  Fastin-130 mg/dl  2 hours after meal:  mg/dl  Before Bed: 100-150 mg/dl  If Blood sugar is below 70 mg/dl, DO NOT take diabetes medication. Eat first and then recheck blood sugar in 20 minutes.  Foods to eat if blood sugar is below 70 mg/dl  1/2 glass of orange juice   1/2 regular cola can   3-4 hard candies   3-4 small glucose tabs.   Foods to eat if blood sugar is below 50 mg/dl  1 glass of orange juice  1 regular cola can  8 hard candies  8 small glucose tabs.   If you have repeated eating/blood sugar recheck process 2 times and blood sugar still below 70 mg/dl, call 911.

## 2022-03-08 NOTE — TELEPHONE ENCOUNTER
----- Message from Danae Rodriguez sent at 3/8/2022  4:10 PM CST -----  Who Called: Patient    What is the reqeust in detail: Returning call from office. Patient explains that she has no more testing needles and strips to check if her sugar has gone down. Please advise.     Can the clinic reply by [facility]? No    Best Call Back Number: [phone]    Additional Information:

## 2022-03-09 ENCOUNTER — TELEPHONE (OUTPATIENT)
Dept: DIABETES | Facility: CLINIC | Age: 77
End: 2022-03-09
Payer: MEDICARE

## 2022-03-09 NOTE — TELEPHONE ENCOUNTER
----- Message from Natty Mistry NP sent at 3/9/2022  4:15 PM CST -----  Please inform patient A1C increased to 10.8 from previous result.  Start recording her sugar readings as discussed during her visit.  Keep appt to see me in 3 weeks and bring her logs.  We will review her medications then and make necessary adjustments.

## 2022-03-09 NOTE — PROGRESS NOTES
Please inform patient A1C increased to 10.8 from previous result.  Start recording her sugar readings as discussed during her visit.  Keep appt to see me in 3 weeks and bring her logs.  We will review her medications then and make necessary adjustments.

## 2022-03-15 ENCOUNTER — OUTPATIENT CASE MANAGEMENT (OUTPATIENT)
Dept: ADMINISTRATIVE | Facility: OTHER | Age: 77
End: 2022-03-15
Payer: MEDICARE

## 2022-03-15 NOTE — LETTER
March 15, 2022    Avril GRANT Monzon  [address]  [place] [address]             [facility] [address] [place] [address] Dear Mrs Mackenzie,      I work with [facility] Outpatient Case Management Department. I have been unsuccessful at reaching you recently since we spoke on 3/3/22. If you require any future assistance or if any new concerns or problems arise, please call me.     The Outpatient Case Management Department can be reached at [phone] from 8 AM to 4:30 PM Monday thru Friday.    [facility] also has a program called [facility]) with a nurse available 24/7 to answer questions or provide medical advice for any non-emergent symptoms you may have. That number is [phone].     Kindest Regards,        Debbie Lopez RN  Outpatient Case Management  [phone]

## 2022-03-25 NOTE — PROGRESS NOTES
"Subjective:      Patient ID: Avril Monzon is a 77 y.o. female.    Chief Complaint: Follow-up      HPI  Here for f/u medical problems and preventive exam.  ESRD, on HD.  DM nurse managing sugars.  Not checking sugars.  No f/c/sw/cough.  No cp/sob/palp.  BMs normal with prn dulcolax.  Makes urine, has a bad odor.  No dysuria.  Lives with daughter.    Denies anxiety or depression.    HM: 10/20 fluvax, 1/21 covid vaccines/booster, 1/16 [id number], 5/14 [id number], 12/21 TDaP, 2/21 sched BMD, 2/21 MMG, 2011 Cscope outside, 3/20 Eye Dr. Coello.     Review of Systems   Constitutional: Negative for appetite change, chills, diaphoresis and fever.   HENT: Negative for congestion, ear pain, rhinorrhea, sinus pressure and sore throat.    Respiratory: Negative for cough, chest tightness and shortness of breath.    Cardiovascular: Negative for chest pain and palpitations.   Gastrointestinal: Negative for blood in stool, constipation, diarrhea, nausea and vomiting.   Genitourinary: Negative for dysuria, frequency, hematuria, menstrual problem, urgency and vaginal discharge.   Musculoskeletal: Negative for arthralgias.   Skin: Negative for rash.   Neurological: Negative for dizziness and headaches.   Psychiatric/Behavioral: Negative for sleep disturbance. The patient is not nervous/anxious.          Objective:   BP (!) 142/40   Pulse 61   Temp 98 °F (36.7 °C)   Ht 5' 5" (1.651 m)   Wt 59.4 kg (130 lb 15.3 oz)   LMP  (LMP Unknown)   SpO2 96%   BMI 21.79 kg/m²     Physical Exam  Constitutional:       Appearance: She is well-developed.   HENT:      Right Ear: External ear normal. Tympanic membrane is not injected.      Left Ear: External ear normal. Tympanic membrane is not injected.   Eyes:      Conjunctiva/sclera: Conjunctivae normal.   Neck:      Thyroid: No thyromegaly.   Cardiovascular:      Rate and Rhythm: Normal rate and regular rhythm.      Heart sounds: No murmur (systolic) heard.    No friction rub. No gallop. "   Pulmonary:      Effort: Pulmonary effort is normal.      Breath sounds: Normal breath sounds. No wheezing or rales.   Abdominal:      General: Bowel sounds are normal.      Palpations: Abdomen is soft. There is no mass.      Tenderness: There is no abdominal tenderness.   Musculoskeletal:      Cervical back: Normal range of motion and neck supple.   Lymphadenopathy:      Cervical: No cervical adenopathy.   Skin:     General: Skin is warm.      Findings: No rash.   Neurological:      Mental Status: She is alert and oriented to person, place, and time.        Latest Reference Range & Units 12/21/21 04:55 03/08/22 10:00   Sodium 136 - 145 mmol/L 141    Potassium 3.5 - 5.1 mmol/L 4.5    Chloride 95 - 110 mmol/L 107    CO2 23 - 29 mmol/L 21 (L)    Anion Gap 8 - 16 mmol/L 13    BUN 8 - 23 mg/dL 25 (H)    Creatinine 0.5 - 1.4 mg/dL 4.3 (H)    EGFR if non African American >60 mL/min/1.73 m^2 10 ! [1]    EGFR if African American >60 mL/min/1.73 m^2 11 !    Glucose 70 - 110 mg/dL 275 (H)    Calcium 8.7 - 10.5 mg/dL 8.7    Hemoglobin A1C External 4.0 - 5.6 %  10.8 (H) [2]   Estimated Avg Glucose 68 - 131 mg/dL  263 (H)      Latest Reference Range & Units 12/20/21 13:14   WBC 3.90 - 12.70 K/uL 8.22   RBC 4.00 - 5.40 M/uL 2.43 (L)   Hemoglobin 12.0 - 16.0 g/dL 8.0 (L)   Hematocrit 37.0 - 48.5 % 24.9 (L)   MCV 82 - 98 fL 103 (H)   MCH 27.0 - 31.0 pg 32.9 (H)   MCHC 32.0 - 36.0 g/dL 32.1   RDW 11.5 - 14.5 % 14.1   Platelets 150 - 450 K/uL 252   (L): Data is abnormally low  (H): Data is abnormally high    Assessment:       1. Hypertension associated with diabetes    2. Type 2 diabetes mellitus with diabetic peripheral angiopathy without gangrene, with long-term current use of insulin    3. ESRD (end stage renal disease)    4. History of CVA (cerebrovascular accident)    5. DM type 2 with diabetic peripheral neuropathy    6. Coronary artery disease involving native coronary artery of native heart with angina pectoris with  documented spasm    7. Chronic diastolic heart failure    8. Encounter for preventive health examination    9. Urine malodor          Plan:     Hypertension associated with diabetes- adeq control.    Type 2 diabetes mellitus with diabetic peripheral angiopathy without gangrene, with long-term current use of insulin- per DM nurse.    ESRD (end stage renal disease)- f/w Nephro.    History of CVA (cerebrovascular accident)    DM type 2 with diabetic peripheral neuropathy    Coronary artery disease involving native coronary artery of native heart with angina pectoris with documented spasm, Chronic diastolic heart - per Cards.    Encounter for preventive health examination- utd.    Urine malodor, r/o UTI-  -     Urinalysis; Future; Expected date: 04/06/2022  -     Urine culture; Future; Expected date: 04/06/2022    RTC 6mo.

## 2022-03-29 ENCOUNTER — TELEPHONE (OUTPATIENT)
Dept: DIABETES | Facility: CLINIC | Age: 77
End: 2022-03-29
Payer: MEDICARE

## 2022-03-29 NOTE — PROGRESS NOTES
Outpatient Care Management  Plan of Care Follow Up Visit    Patient: Avril Monzon  MRN: [id number]  Date of Service: 03/15/2022  Completed by: Debbie Lopez RN  Referral Date: 12/20/2021  Program: Case Management (High Risk)    Reason for Visit   Patient presents with    OPCM RN First Follow-Up Attempt    OPCM RN Second Follow-Up Attempt    Update Plan Of Care       Brief Summary: Phone contact with Mrs Mackenzie's daughter, Elizabteh, today and several diabetes care plan tasks were reviewed. She agreed to take Mrs Mackenzie to her Diab NP appt on 4/5/22 and bring the glucometer they recently got from her [facility] that they report does not work so they can be educated on how to use it. She also agreed to follow up contact from this CM in about 2 weeks as she has scheduled appts every day next week except Friday when she has dialysis.     Patient Summary     Involvement of Care:  Do I have permission to speak with other family members about your care?       Patient Reported Labs & Vitals:  1.  Any Patient Reported Labs & Vitals?     2.  Patient Reported Blood Pressure:     3.  Patient Reported Pulse:     4.  Patient Reported Weight (Kg):     5.  Patient Reported Blood Glucose (mg/dl):       Medical and social history was reviewed with patient and/or caregiver.     Clinical Assessment     Reviewed and provided basic information on available community resources for mental health, transportation, wellness resources, and palliative care programs with patient and/or caregiver.     Complex Care Plan     Care plan was discussed and completed today with input from patient and/or caregiver.    Patient Instructions     Instructions were provided via the [employer] patient resources and are available for the patient to view on the patient portal.    Next Steps: Continue to attempt to educate Mrs Mackenzie or Elizabeth on the importance of getting DM controlled thru med and diet compliance. Debbie Lopez RN    Follow up in about 2  weeks (around 3/29/2022) for address care plan tasks, review mailed literature.    Todays OPCM Self-Management Care Plan was developed with the patients/caregivers input and was based on identified barriers from todays assessment.  Goals were written today with the patient/caregiver and the patient has agreed to work towards these goals to improve his/her overall well-being. Patient verbalized understanding of the care plan, goals, and all of today's instructions. Encouraged patient/caregiver to communicate with his/her physician and health care team about health conditions and the treatment plan.  Provided my contact information today and encouraged patient/caregiver to call me with any questions as needed.

## 2022-04-05 ENCOUNTER — PATIENT OUTREACH (OUTPATIENT)
Dept: ADMINISTRATIVE | Facility: OTHER | Age: 77
End: 2022-04-05
Payer: MEDICARE

## 2022-04-06 ENCOUNTER — OFFICE VISIT (OUTPATIENT)
Dept: FAMILY MEDICINE | Facility: CLINIC | Age: 77
End: 2022-04-06
Payer: MEDICARE

## 2022-04-06 ENCOUNTER — TELEPHONE (OUTPATIENT)
Dept: ALLERGY | Facility: CLINIC | Age: 77
End: 2022-04-06
Payer: MEDICARE

## 2022-04-06 VITALS
HEIGHT: 65 IN | OXYGEN SATURATION: 96 % | DIASTOLIC BLOOD PRESSURE: 40 MMHG | SYSTOLIC BLOOD PRESSURE: 142 MMHG | WEIGHT: 130.94 LBS | TEMPERATURE: 98 F | HEART RATE: 61 BPM | BODY MASS INDEX: 21.81 KG/M2

## 2022-04-06 DIAGNOSIS — R82.90 URINE MALODOR: ICD-10-CM

## 2022-04-06 DIAGNOSIS — N18.6 ESRD (END STAGE RENAL DISEASE): ICD-10-CM

## 2022-04-06 DIAGNOSIS — Z00.00 ENCOUNTER FOR PREVENTIVE HEALTH EXAMINATION: ICD-10-CM

## 2022-04-06 DIAGNOSIS — E11.42 DM TYPE 2 WITH DIABETIC PERIPHERAL NEUROPATHY: ICD-10-CM

## 2022-04-06 DIAGNOSIS — Z86.73 HISTORY OF CVA (CEREBROVASCULAR ACCIDENT): ICD-10-CM

## 2022-04-06 DIAGNOSIS — Z79.4 TYPE 2 DIABETES MELLITUS WITH DIABETIC PERIPHERAL ANGIOPATHY WITHOUT GANGRENE, WITH LONG-TERM CURRENT USE OF INSULIN: ICD-10-CM

## 2022-04-06 DIAGNOSIS — I15.2 HYPERTENSION ASSOCIATED WITH DIABETES: Primary | Chronic | ICD-10-CM

## 2022-04-06 DIAGNOSIS — I50.32 CHRONIC DIASTOLIC HEART FAILURE: ICD-10-CM

## 2022-04-06 DIAGNOSIS — E11.51 TYPE 2 DIABETES MELLITUS WITH DIABETIC PERIPHERAL ANGIOPATHY WITHOUT GANGRENE, WITH LONG-TERM CURRENT USE OF INSULIN: ICD-10-CM

## 2022-04-06 DIAGNOSIS — E11.59 HYPERTENSION ASSOCIATED WITH DIABETES: Primary | Chronic | ICD-10-CM

## 2022-04-06 DIAGNOSIS — I25.111 CORONARY ARTERY DISEASE INVOLVING NATIVE CORONARY ARTERY OF NATIVE HEART WITH ANGINA PECTORIS WITH DOCUMENTED SPASM: ICD-10-CM

## 2022-04-06 PROCEDURE — 3046F HEMOGLOBIN A1C LEVEL >9.0%: CPT | Mod: CPTII,S$GLB,, | Performed by: INTERNAL MEDICINE

## 2022-04-06 PROCEDURE — 1159F MED LIST DOCD IN RCRD: CPT | Mod: CPTII,S$GLB,, | Performed by: INTERNAL MEDICINE

## 2022-04-06 PROCEDURE — 99999 PR PBB SHADOW E&M-EST. PATIENT-LVL III: ICD-10-PCS | Mod: PBBFAC,,, | Performed by: INTERNAL MEDICINE

## 2022-04-06 PROCEDURE — 99214 OFFICE O/P EST MOD 30 MIN: CPT | Mod: S$GLB,,, | Performed by: INTERNAL MEDICINE

## 2022-04-06 PROCEDURE — 99999 PR PBB SHADOW E&M-EST. PATIENT-LVL III: CPT | Mod: PBBFAC,,, | Performed by: INTERNAL MEDICINE

## 2022-04-06 PROCEDURE — 3288F FALL RISK ASSESSMENT DOCD: CPT | Mod: CPTII,S$GLB,, | Performed by: INTERNAL MEDICINE

## 2022-04-06 PROCEDURE — 3077F SYST BP >= 140 MM HG: CPT | Mod: CPTII,S$GLB,, | Performed by: INTERNAL MEDICINE

## 2022-04-06 PROCEDURE — 1101F PT FALLS ASSESS-DOCD LE1/YR: CPT | Mod: CPTII,S$GLB,, | Performed by: INTERNAL MEDICINE

## 2022-04-06 PROCEDURE — 3078F DIAST BP <80 MM HG: CPT | Mod: CPTII,S$GLB,, | Performed by: INTERNAL MEDICINE

## 2022-04-06 PROCEDURE — 1126F AMNT PAIN NOTED NONE PRSNT: CPT | Mod: CPTII,S$GLB,, | Performed by: INTERNAL MEDICINE

## 2022-04-06 PROCEDURE — 3288F PR FALLS RISK ASSESSMENT DOCUMENTED: ICD-10-PCS | Mod: CPTII,S$GLB,, | Performed by: INTERNAL MEDICINE

## 2022-04-06 PROCEDURE — 99214 PR OFFICE/OUTPT VISIT, EST, LEVL IV, 30-39 MIN: ICD-10-PCS | Mod: S$GLB,,, | Performed by: INTERNAL MEDICINE

## 2022-04-06 PROCEDURE — 1101F PR PT FALLS ASSESS DOC 0-1 FALLS W/OUT INJ PAST YR: ICD-10-PCS | Mod: CPTII,S$GLB,, | Performed by: INTERNAL MEDICINE

## 2022-04-06 PROCEDURE — 3077F PR MOST RECENT SYSTOLIC BLOOD PRESSURE >= 140 MM HG: ICD-10-PCS | Mod: CPTII,S$GLB,, | Performed by: INTERNAL MEDICINE

## 2022-04-06 PROCEDURE — 3046F PR MOST RECENT HEMOGLOBIN A1C LEVEL > 9.0%: ICD-10-PCS | Mod: CPTII,S$GLB,, | Performed by: INTERNAL MEDICINE

## 2022-04-06 PROCEDURE — 1126F PR PAIN SEVERITY QUANTIFIED, NO PAIN PRESENT: ICD-10-PCS | Mod: CPTII,S$GLB,, | Performed by: INTERNAL MEDICINE

## 2022-04-06 PROCEDURE — 1159F PR MEDICATION LIST DOCUMENTED IN MEDICAL RECORD: ICD-10-PCS | Mod: CPTII,S$GLB,, | Performed by: INTERNAL MEDICINE

## 2022-04-06 PROCEDURE — 3078F PR MOST RECENT DIASTOLIC BLOOD PRESSURE < 80 MM HG: ICD-10-PCS | Mod: CPTII,S$GLB,, | Performed by: INTERNAL MEDICINE

## 2022-04-06 NOTE — TELEPHONE ENCOUNTER
----- Message from Zuri Celeste sent at 4/6/2022  3:04 PM CDT -----  .Type:  Sooner Apoointment Request    Caller is requesting a sooner appointment.  Caller declined first available appointment listed below.  Caller will not accept being placed on the waitlist and is requesting a message be sent to doctor.  Name of Caller:Elizabeth (daughter)   When is the first available appointment? 06/27  Symptoms: follow up   Would the patient rather a call back or a response via [facility]?  [facility] Call Back Number: [phone]  Additional Information:

## 2022-04-07 ENCOUNTER — OFFICE VISIT (OUTPATIENT)
Dept: PODIATRY | Facility: CLINIC | Age: 77
End: 2022-04-07
Payer: MEDICARE

## 2022-04-07 VITALS — WEIGHT: 130 LBS | BODY MASS INDEX: 21.66 KG/M2 | HEIGHT: 65 IN

## 2022-04-07 DIAGNOSIS — E11.42 CONTROLLED TYPE 2 DIABETES MELLITUS WITH DIABETIC POLYNEUROPATHY, WITH LONG-TERM CURRENT USE OF INSULIN: Primary | ICD-10-CM

## 2022-04-07 DIAGNOSIS — Z79.4 TYPE 2 DIABETES MELLITUS WITH STABLE PROLIFERATIVE RETINOPATHY OF BOTH EYES, WITH LONG-TERM CURRENT USE OF INSULIN: ICD-10-CM

## 2022-04-07 DIAGNOSIS — B35.1 DERMATOPHYTOSIS OF NAIL: ICD-10-CM

## 2022-04-07 DIAGNOSIS — Z79.4 CONTROLLED TYPE 2 DIABETES MELLITUS WITH DIABETIC POLYNEUROPATHY, WITH LONG-TERM CURRENT USE OF INSULIN: Primary | ICD-10-CM

## 2022-04-07 DIAGNOSIS — E11.3553 TYPE 2 DIABETES MELLITUS WITH STABLE PROLIFERATIVE RETINOPATHY OF BOTH EYES, WITH LONG-TERM CURRENT USE OF INSULIN: ICD-10-CM

## 2022-04-07 PROCEDURE — 11719 PR TRIM NAIL(S): ICD-10-PCS | Mod: Q9,59,S$GLB, | Performed by: PODIATRIST

## 2022-04-07 PROCEDURE — 99999 PR PBB SHADOW E&M-EST. PATIENT-LVL III: ICD-10-PCS | Mod: PBBFAC,,, | Performed by: PODIATRIST

## 2022-04-07 PROCEDURE — 99499 UNLISTED E&M SERVICE: CPT | Mod: S$GLB,,, | Performed by: PODIATRIST

## 2022-04-07 PROCEDURE — 11719 TRIM NAIL(S) ANY NUMBER: CPT | Mod: Q9,59,S$GLB, | Performed by: PODIATRIST

## 2022-04-07 PROCEDURE — 3046F HEMOGLOBIN A1C LEVEL >9.0%: CPT | Mod: CPTII,S$GLB,, | Performed by: PODIATRIST

## 2022-04-07 PROCEDURE — 1159F PR MEDICATION LIST DOCUMENTED IN MEDICAL RECORD: ICD-10-PCS | Mod: CPTII,S$GLB,, | Performed by: PODIATRIST

## 2022-04-07 PROCEDURE — 11720 DEBRIDE NAIL 1-5: CPT | Mod: Q9,S$GLB,, | Performed by: PODIATRIST

## 2022-04-07 PROCEDURE — 99999 PR PBB SHADOW E&M-EST. PATIENT-LVL III: CPT | Mod: PBBFAC,,, | Performed by: PODIATRIST

## 2022-04-07 PROCEDURE — 11720 PR DEBRIDEMENT OF NAIL(S), 1-5: ICD-10-PCS | Mod: Q9,S$GLB,, | Performed by: PODIATRIST

## 2022-04-07 PROCEDURE — 3046F PR MOST RECENT HEMOGLOBIN A1C LEVEL > 9.0%: ICD-10-PCS | Mod: CPTII,S$GLB,, | Performed by: PODIATRIST

## 2022-04-07 PROCEDURE — 1126F PR PAIN SEVERITY QUANTIFIED, NO PAIN PRESENT: ICD-10-PCS | Mod: CPTII,S$GLB,, | Performed by: PODIATRIST

## 2022-04-07 PROCEDURE — 1159F MED LIST DOCD IN RCRD: CPT | Mod: CPTII,S$GLB,, | Performed by: PODIATRIST

## 2022-04-07 PROCEDURE — 99499 NO LOS: ICD-10-PCS | Mod: S$GLB,,, | Performed by: PODIATRIST

## 2022-04-07 PROCEDURE — 3288F PR FALLS RISK ASSESSMENT DOCUMENTED: ICD-10-PCS | Mod: CPTII,S$GLB,, | Performed by: PODIATRIST

## 2022-04-07 PROCEDURE — 3288F FALL RISK ASSESSMENT DOCD: CPT | Mod: CPTII,S$GLB,, | Performed by: PODIATRIST

## 2022-04-07 PROCEDURE — 1126F AMNT PAIN NOTED NONE PRSNT: CPT | Mod: CPTII,S$GLB,, | Performed by: PODIATRIST

## 2022-04-07 PROCEDURE — 1101F PT FALLS ASSESS-DOCD LE1/YR: CPT | Mod: CPTII,S$GLB,, | Performed by: PODIATRIST

## 2022-04-07 PROCEDURE — 1101F PR PT FALLS ASSESS DOC 0-1 FALLS W/OUT INJ PAST YR: ICD-10-PCS | Mod: CPTII,S$GLB,, | Performed by: PODIATRIST

## 2022-04-07 NOTE — PROGRESS NOTES
[facility] -   PODIATRIC MEDICINE AND SURGERY  PROGRESS NOTE  4/7/2022    PODIATRY NOTE  PCP: Dr. Rose Germain MD, Last Visit 2/2/21    CHIEF COMPLAINT   Chief Complaint   Patient presents with    Diabetic Foot Exam     F/u for RFC, patient states that she already cut her toenails, 0 pain in feet, diabetic, wears casual shoes, last seen PCP Dr. Germain on 04/06/22       HPI:    Avril Monzon is a 77 y.o. female who has a past medical history of Acute hypoxemic respiratory failure (11/26/2018), Anemia, Arthritis, Back pain, Cataract, CKD stage G4/A3, GFR 15 - 29 and albumin creatinine ratio >300 mg/g, Coronary artery disease involving native coronary artery of native heart (11/30/2018), Diabetic retinopathy, DM (diabetes mellitus) type II controlled, neurological manifestation, Exudative age-related macular degeneration of left eye with active choroidal neovascularization (2/5/2019), GERD (gastroesophageal reflux disease), Glaucoma, Heart failure, Hyperlipidemia, Hypertension, Non-ST elevation MI (NSTEMI) (11/28/2018), NSTEMI (non-ST elevated myocardial infarction) (11/27/2018), Peripheral neuropathy, Polyneuropathy, Proteinuria, Seizures, Stroke (2012,2014), Tobacco dependence, and Type 2 diabetes with peripheral circulatory disorder, controlled.     Avril presents to clinic today complaining of painful elongated toenails. She is legally blind and unable to trim her nails.  She requests at risk foot care due to thickened, painful, elongated toenails. Relief is obtained with routine debridements. Patient denies other pedal complaints at this time.     Hemoglobin A1C   Date Value Ref Range Status   03/08/2022 10.8 (H) 4.0 - 5.6 % Final     Comment:     ADA Screening Guidelines:  5.7-6.4%  Consistent with prediabetes  >or=6.5%  Consistent with diabetes    High levels of fetal hemoglobin interfere with the HbA1C  assay. Heterozygous hemoglobin variants (HbS, HgC, etc)do  not significantly  interfere with this assay.   However, presence of multiple variants may affect accuracy.     10/19/2021 8.6 (H) 4.0 - 5.6 % Final     Comment:     ADA Screening Guidelines:  5.7-6.4%  Consistent with prediabetes  >or=6.5%  Consistent with diabetes    High levels of fetal hemoglobin interfere with the HbA1C  assay. Heterozygous hemoglobin variants (HbS, HgC, etc)do  not significantly interfere with this assay.   However, presence of multiple variants may affect accuracy.     04/26/2021 7.3 % Final   01/26/2021 7.0 (H) 4.0 - 5.6 % Final     Comment:     ADA Screening Guidelines:  5.7-6.4%  Consistent with prediabetes  >or=6.5%  Consistent with diabetes    High levels of fetal hemoglobin interfere with the HbA1C  assay. Heterozygous hemoglobin variants (HbS, HgC, etc)do  not significantly interfere with this assay.   However, presence of multiple variants may affect accuracy.     10/26/2020 9.0 % Final     Comment:     Per Care Everywhere          [facility]  Past Medical History:   Diagnosis Date    Acute hypoxemic respiratory failure 11/26/2018    Anemia     Arthritis     back, hand, knees    Back pain     Cataract     CKD stage G4/A3, GFR 15 - 29 and albumin creatinine ratio >300 mg/g     GFR 40% Jan 2014 and 33% ub 3/2014 (BR[facility])    Coronary artery disease involving native coronary artery of native heart 11/30/2018    Diabetic retinopathy     DM (diabetes mellitus) type II controlled, neurological manifestation     Exudative age-related macular degeneration of left eye with active choroidal neovascularization 2/5/2019    GERD (gastroesophageal reflux disease)     Glaucoma     Heart failure     Hyperlipidemia     Hypertension     Non-ST elevation MI (NSTEMI) 11/28/2018    NSTEMI (non-ST elevated myocardial infarction) 11/27/2018    Peripheral neuropathy     Polyneuropathy     Proteinuria     >6 mo     Seizures     [facility] 1/2014 -dick CT NRI showed small vessel    Stroke 2012,2014    Tobacco dependence      Type 2 diabetes with peripheral circulatory disorder, controlled        PROBLEM LIST  Patient Active Problem List    Diagnosis Date Noted    Chronic diastolic heart failure 12/20/2021    Elevated troponin 12/06/2021    Other specified complication of vascular prosthetic devices, implants and grafts, initial encounter 10/19/2021    Unspecified inflammatory spondylopathy, lumbar region 03/11/2021    Convulsions 03/11/2021    Hemiplegia 03/11/2021    Pulmonary edema 07/01/2020    DM type 2 with diabetic peripheral neuropathy 06/12/2020    Age-related nuclear cataract of left eye 05/06/2019    Non-rheumatic mitral regurgitation 04/05/2019    Non-rheumatic tricuspid valve insufficiency 04/05/2019    Other headache syndrome 03/26/2019    Exudative age-related macular degeneration of left eye with active choroidal neovascularization 02/05/2019    Coronary artery disease involving native coronary artery of native heart 11/30/2018    Acute CHF     Pulmonary hypertension 11/28/2018    Hyperlipidemia associated with type 2 diabetes mellitus 06/08/2018    Iron deficiency anemia due to chronic blood loss 02/22/2018    Anemia in chronic kidney disease, on chronic dialysis 05/18/2017    History of CVA (cerebrovascular accident) 05/16/2017    Hyperparathyroidism 05/16/2017    Vitamin D deficiency 05/16/2017    DDD (degenerative disc disease), lumbar 05/16/2017    Bilateral carotid artery disease 11/11/2016    Aortic atherosclerosis 11/11/2016    Type 2 diabetes mellitus with circulatory disorder, with long-term current use of insulin 11/11/2016    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, with long-term current use of insulin 05/02/2016    Cataracta brunescens of right eye 05/02/2016    ESRD (end stage renal disease)     Proteinuria     Constipation 02/18/2014    Hypertension associated with diabetes     Hyperglycemia due to diabetes mellitus        MEDS  Current  Outpatient Medications on File Prior to Visit   Medication Sig Dispense Refill    ACCU-CHEK ARDEN PLUS METER [facility] USE TO TEST TWICE DAILY  0    BIDIL 20-37.5 mg Tab TAKE 2 TABLETS BY MOUTH THREE TIMES DAILY 180 tablet 3    blood sugar diagnostic Strp 1 strip by Misc.(Non-Drug; Combo Route) route 3 (three) times daily. relion ultima 100 strip 11    blood sugar diagnostic Strp To check BG 2 times daily, to use with insurance preferred meter 100 each 6    blood-glucose meter kit To check BG 2 times daily, to use with insurance preferred meter 1 each 0    cyanocobalamin (VITAMIN B-12) 1000 MCG tablet Take 100 mcg by mouth once daily.      erythromycin (ROMYCIN) ophthalmic ointment PLACE A SMALL AMOUNT INTO LEFT EYE THREE TIMES DAILY AS DIRECTED      folic acid (FOLVITE) 1 MG tablet Take 1 mg by mouth once daily.      hydrOXYzine HCL (ATARAX) 25 MG tablet Take 1 tablet (25 mg total) by mouth 3 (three) times daily as needed for Itching. 90 tablet 2    insulin NPH/Reg human (HUMULIN, 70/30,) 100 unit/mL (70-30) InPn pen To do 28 units subq in AM and 8 units subq in PM.  Dose to be done within 15 min before or after meal. (Patient taking differently: To do 28 units subq in AM and 8 units subq in PM.  Dose to be done within 15 min before or after meal.) 3 each 1    lancets (ACCU-CHEK SOFTCLIX LANCETS) Misc 1 lancet by Misc.(Non-Drug; Combo Route) route 3 (three) times daily. 100 each 11    lancets Misc To check BG 2 times daily, to use with insurance preferred meter 100 each 6    metoprolol tartrate (LOPRESSOR) 50 MG tablet Take 1 tablet (50 mg total) by mouth 2 (two) times daily. 60 tablet 6    triamcinolone acetonide 0.1% (KENALOG) 0.1 % cream Apply topically 2 (two) times daily. 80 g 2    aspirin (ECOTRIN) 81 MG EC tablet Take 1 tablet (81 mg total) by mouth once daily.  0    clopidogreL (PLAVIX) 75 mg tablet Take 1 tablet (75 mg total) by mouth once daily. 60 tablet 0     No current facility-administered  medications on file prior to visit.       Medication List with Changes/Refills   Current Medications    ACCU-CHEK ARDEN PLUS METER MISC    USE TO TEST TWICE DAILY    ASPIRIN (ECOTRIN) 81 MG EC TABLET    Take 1 tablet (81 mg total) by mouth once daily.    BIDIL 20-37.5 MG TAB    TAKE 2 TABLETS BY MOUTH THREE TIMES DAILY    BLOOD SUGAR DIAGNOSTIC STRP    1 strip by Misc.(Non-Drug; Combo Route) route 3 (three) times daily. relion ultima    BLOOD SUGAR DIAGNOSTIC STRP    To check BG 2 times daily, to use with insurance preferred meter    BLOOD-GLUCOSE METER KIT    To check BG 2 times daily, to use with insurance preferred meter    CLOPIDOGREL (PLAVIX) 75 MG TABLET    Take 1 tablet (75 mg total) by mouth once daily.    CYANOCOBALAMIN (VITAMIN B-12) 1000 MCG TABLET    Take 100 mcg by mouth once daily.    ERYTHROMYCIN (ROMYCIN) OPHTHALMIC OINTMENT    PLACE A SMALL AMOUNT INTO LEFT EYE THREE TIMES DAILY AS DIRECTED    FOLIC ACID (FOLVITE) 1 MG TABLET    Take 1 mg by mouth once daily.    HYDROXYZINE HCL (ATARAX) 25 MG TABLET    Take 1 tablet (25 mg total) by mouth 3 (three) times daily as needed for Itching.    INSULIN NPH/REG HUMAN (HUMULIN, 70/30,) 100 UNIT/ML (70-30) INPN PEN    To do 28 units subq in AM and 8 units subq in PM.  Dose to be done within 15 min before or after meal.    LANCETS (ACCU-CHEK SOFTCLIX LANCETS) MISC    1 lancet by Misc.(Non-Drug; Combo Route) route 3 (three) times daily.    LANCETS MISC    To check BG 2 times daily, to use with insurance preferred meter    METOPROLOL TARTRATE (LOPRESSOR) 50 MG TABLET    Take 1 tablet (50 mg total) by mouth 2 (two) times daily.    TRIAMCINOLONE ACETONIDE 0.1% (KENALOG) 0.1 % CREAM    Apply topically 2 (two) times daily.       PSH     Past Surgical History:   Procedure Laterality Date    BREAST LUMPECTOMY Left     benign, when patient was 15 years old    BREAST MASS EXCISION      ,benign, age 15.    CATHETERIZATION OF BOTH LEFT AND RIGHT HEART N/A 11/28/2018     Procedure: CATHETERIZATION, HEART, BOTH LEFT AND RIGHT;  Surgeon: Michelle Holman MD;  Location: [facility] CATH LAB;  Service: Cardiology;  Laterality: N/A;    CATHETERIZATION OF BOTH LEFT AND RIGHT HEART N/A 2018    Procedure: CATHETERIZATION, HEART, BOTH LEFT AND RIGHT;  Surgeon: Michelle Holman MD;  Location: [facility] CATH LAB;  Service: Cardiology;  Laterality: N/A;    HYSTERECTOMY  1982    INSERTION OF SHUNT      LEFT HEART CATHETERIZATION Left 12/3/2018    Procedure: CATHETERIZATION, HEART, LEFT;  Surgeon: Michelle Holman MD;  Location: [facility] CATH LAB;  Service: Cardiology;  Laterality: Left;  LAD ATHERECTOMY STENT/ FEMORAL APPROACH    OOPHORECTOMY      wrist cyst Right 1980s    dorsal wrist cyst        ALL  Review of patient's allergies indicates:   Allergen Reactions    Codeine Swelling    Darvon [propoxyphene] Swelling       SOC     Social History     Tobacco Use    Smoking status: Former Smoker     Packs/day: 1.50     Years: 30.00     Pack years: 45.00     Types: Cigarettes     Quit date: 1990     Years since quittin.2    Smokeless tobacco: Former User   Substance Use Topics    Alcohol use: No     Alcohol/week: 0.0 standard drinks    Drug use: No         FAMILY HX    Family History   Problem Relation Age of Onset    Diabetes Mother     Hyperlipidemia Mother     Hypertension Mother     Heart disease Mother         MI    Muscular dystrophy Son     Cancer Maternal Grandmother         colon            REVIEW OF SYSTEMS   General: This patient is well-developed, well-nourished and appears stated age, well-oriented to person, place and time, and cooperative and pleasant on today's visit  Constitutional: Negative for chills and fever.   Respiratory: Negative for shortness of breath.    Cardiovascular: Negative for chest pain, palpitations, orthopnea  Gastrointestinal: Negative for diarrhea, nausea and vomiting.   Musculoskeletal: Positive for above noted in HPI  Skin: positive for skin and  "nail changes  Neurological: positive for tingling and sensory changes  Peripheral Vascular: no claudication or cyanosis  Psychiatric/Behavioral: Negative for altered mental status     PHYSICAL EXAM:      Vitals:    04/07/22 1054   Weight: 59 kg (130 lb)   Height: 5' 5" (1.651 m)   PainSc: 0-No pain       General: This patient is well-developed, well-nourished and appears stated age, well-oriented to person, place and time, and cooperative and pleasant on today's visit    LOWER EXTREMITY  VASCULAR  Diminished pedal pulses b/l  Capillary refill time immediate to the toes.   Feet are warm to the touch. Skin temperature warm to warm from proximally to distally   There are varicosities, telangiectasias noted to bilateral foot and ankle regions.   There are no ecchymoses noted to bilateral foot and ankle regions.   There is gross lower extremity edema.    DERMATOLOGIC  Skin moist with healthy texture and turgor.  There are no open ulcerations, lacerations, or fissures to bilateral foot and ankle regions. There are no signs of infection as there is no erythema, no proximal-extending lymphangiitis, no fluctuance, or crepitus noted on palpation to bilateral foot and ankle regions.   There is no interdigital maceration.   There is hyperkeratotic lesions noted medial left hallux  Nails 1, 4, 5 RIGHT and LEFT foot thickened, and elongated, the remaining 4 nails were elongated and not thickened     NEUROLOGIC  Epicritic sensation is diminished.   Achilles and patellar deep tendon reflexes intact  Babinski reflex absent    ORTHOPEDIC/BIOMECHANICAL  Muscle strength AT/EHL/EDL/PT: 5/5; Achilles/Gastroc/Soleus: 5/5; PB/PL: 5/5 Muscle tone is normal.  Ankle joint ROM INTACT DF/PF, non-crepitus  STJ ROM  inv/ev, non crepitus         ASSESSMENT     Encounter Diagnoses   Name Primary?    Controlled type 2 diabetes mellitus with diabetic polyneuropathy, with long-term current use of insulin Yes    Dermatophytosis of nail     Type 2 " diabetes mellitus with stable proliferative retinopathy of both eyes, with long-term current use of insulin          PLAN  Patient was educated about clinical and imaging findings, and verbalizes understanding of above.       -With patient's permission, the elongated onychomycotic toenails, as outlined in the physical examination x6, were sharply debrided with a double action nail nipper to their soft tissue attachment. If indicated, the nails were then smoothed down in thickness with a mechanical rotary chaz and/or hui board to facilitate in further debridement removing all offending nail and subungual debris    -With patient's permission, nails elongated x 4 were trimmed with nail nipper. Patient relates relief following the procedure. Patient will continue to monitor the areas daily, inspect feet, wear protective shoe gear when ambulatory, moisturizer to maintain skin integrity.    I counseled the patient on his/her Diabetic Mellitus regarding today's clinical examination and objection findings. We did also discuss recent medication changes, pertinent labs and imaging evaluations and other medical consultation notes and progress notes. Greater than 50% of this visit was spent on counseling and coordination of care. Greater than 20 minutes of this appt. was spent on education about the diabetic foot, in relation to PVD and/or neuropathy, and the prevention of limb loss. Shoe gear is inspected and wear and proper fit/type. Patient is reminded of the importance of good nutrition and blood sugar control to help prevent podiatric complications of diabetes. Patient instructed on proper foot hygeine. We discussed wearing proper shoe gear, daily foot inspections, never walking without protective shoe gear, never putting sharp instruments to feet.  Patient  will continue to monitor the areas daily, inspect feet, wear protective shoe gear when ambulatory, moisturizer to maintain skin integrity.     Patient's DMI/DMII is  managed by Primary Care Provider and/or Endocrinology Advanced Practice Provider         Future Appointments   Date Time Provider Department Center   4/12/2022  8:30 AM Natty Mistry, JESSENIA [facility] [place]   4/14/2022  9:00 AM Jesika Dumas MD ON ALLERGY [facility]   6/7/2022 10:00 AM Natty Mistry, JESSEINA [facility] [place]   8/18/2022 10:00 AM Demetrio Mejia MD ON CARDIO [facility]   9/13/2022 10:30 AM Natty Mistry, JESSENIA [facility] [place]   10/10/2022  3:40 PM Rose Germain MD [facility]       Report Electronically Signed By:    Anette Montanez DPM   Podiatric Medicine & Surgery  [facility]  4/7/2022     Disclaimer:  This note may have been prepared using voice recognition software, it may have not been extensively proofed, as such there could be errors within the text such as sound alike errors.

## 2022-04-08 ENCOUNTER — TELEPHONE (OUTPATIENT)
Dept: FAMILY MEDICINE | Facility: CLINIC | Age: 77
End: 2022-04-08
Payer: MEDICARE

## 2022-04-08 DIAGNOSIS — N30.90 CYSTITIS: Primary | ICD-10-CM

## 2022-04-08 RX ORDER — CIPROFLOXACIN 250 MG/1
250 TABLET, FILM COATED ORAL DAILY
Qty: 7 TABLET | Refills: 0 | Status: SHIPPED | OUTPATIENT
Start: 2022-04-08 | End: 2022-04-15

## 2022-04-08 NOTE — TELEPHONE ENCOUNTER
Please tell pt her urine is infected, and I sent in Rx to her [facility] pharmacy.  SM

## 2022-04-12 ENCOUNTER — OFFICE VISIT (OUTPATIENT)
Dept: DIABETES | Facility: CLINIC | Age: 77
End: 2022-04-12
Payer: MEDICARE

## 2022-04-12 ENCOUNTER — OUTPATIENT CASE MANAGEMENT (OUTPATIENT)
Dept: ADMINISTRATIVE | Facility: OTHER | Age: 77
End: 2022-04-12
Payer: MEDICARE

## 2022-04-12 ENCOUNTER — TELEPHONE (OUTPATIENT)
Dept: NEPHROLOGY | Facility: CLINIC | Age: 77
End: 2022-04-12
Payer: MEDICARE

## 2022-04-12 VITALS
WEIGHT: 130.75 LBS | BODY MASS INDEX: 21.76 KG/M2 | SYSTOLIC BLOOD PRESSURE: 168 MMHG | DIASTOLIC BLOOD PRESSURE: 70 MMHG | HEART RATE: 65 BPM

## 2022-04-12 DIAGNOSIS — Z86.73 HISTORY OF CVA (CEREBROVASCULAR ACCIDENT): ICD-10-CM

## 2022-04-12 DIAGNOSIS — E11.69 HYPERLIPIDEMIA ASSOCIATED WITH TYPE 2 DIABETES MELLITUS: ICD-10-CM

## 2022-04-12 DIAGNOSIS — E78.5 HYPERLIPIDEMIA ASSOCIATED WITH TYPE 2 DIABETES MELLITUS: ICD-10-CM

## 2022-04-12 DIAGNOSIS — Z79.4 TYPE 2 DIABETES MELLITUS WITH STABLE PROLIFERATIVE RETINOPATHY OF BOTH EYES, WITH LONG-TERM CURRENT USE OF INSULIN: Primary | ICD-10-CM

## 2022-04-12 DIAGNOSIS — N18.6 ESRD (END STAGE RENAL DISEASE): ICD-10-CM

## 2022-04-12 DIAGNOSIS — E11.3553 TYPE 2 DIABETES MELLITUS WITH STABLE PROLIFERATIVE RETINOPATHY OF BOTH EYES, WITH LONG-TERM CURRENT USE OF INSULIN: Primary | ICD-10-CM

## 2022-04-12 DIAGNOSIS — E11.59 HYPERTENSION ASSOCIATED WITH DIABETES: ICD-10-CM

## 2022-04-12 DIAGNOSIS — I15.2 HYPERTENSION ASSOCIATED WITH DIABETES: ICD-10-CM

## 2022-04-12 LAB — GLUCOSE SERPL-MCNC: 207 MG/DL (ref 70–110)

## 2022-04-12 PROCEDURE — 3288F PR FALLS RISK ASSESSMENT DOCUMENTED: ICD-10-PCS | Mod: CPTII,S$GLB,, | Performed by: NURSE PRACTITIONER

## 2022-04-12 PROCEDURE — 82962 POCT GLUCOSE, HAND-HELD DEVICE: ICD-10-PCS | Mod: S$GLB,,, | Performed by: NURSE PRACTITIONER

## 2022-04-12 PROCEDURE — 1101F PR PT FALLS ASSESS DOC 0-1 FALLS W/OUT INJ PAST YR: ICD-10-PCS | Mod: CPTII,S$GLB,, | Performed by: NURSE PRACTITIONER

## 2022-04-12 PROCEDURE — 3078F PR MOST RECENT DIASTOLIC BLOOD PRESSURE < 80 MM HG: ICD-10-PCS | Mod: CPTII,S$GLB,, | Performed by: NURSE PRACTITIONER

## 2022-04-12 PROCEDURE — 99999 PR PBB SHADOW E&M-EST. PATIENT-LVL IV: ICD-10-PCS | Mod: PBBFAC,,, | Performed by: NURSE PRACTITIONER

## 2022-04-12 PROCEDURE — 99214 OFFICE O/P EST MOD 30 MIN: CPT | Mod: S$GLB,,, | Performed by: NURSE PRACTITIONER

## 2022-04-12 PROCEDURE — 1101F PT FALLS ASSESS-DOCD LE1/YR: CPT | Mod: CPTII,S$GLB,, | Performed by: NURSE PRACTITIONER

## 2022-04-12 PROCEDURE — 3078F DIAST BP <80 MM HG: CPT | Mod: CPTII,S$GLB,, | Performed by: NURSE PRACTITIONER

## 2022-04-12 PROCEDURE — 99999 PR PBB SHADOW E&M-EST. PATIENT-LVL IV: CPT | Mod: PBBFAC,,, | Performed by: NURSE PRACTITIONER

## 2022-04-12 PROCEDURE — 1126F AMNT PAIN NOTED NONE PRSNT: CPT | Mod: CPTII,S$GLB,, | Performed by: NURSE PRACTITIONER

## 2022-04-12 PROCEDURE — 3288F FALL RISK ASSESSMENT DOCD: CPT | Mod: CPTII,S$GLB,, | Performed by: NURSE PRACTITIONER

## 2022-04-12 PROCEDURE — 1126F PR PAIN SEVERITY QUANTIFIED, NO PAIN PRESENT: ICD-10-PCS | Mod: CPTII,S$GLB,, | Performed by: NURSE PRACTITIONER

## 2022-04-12 PROCEDURE — 1160F PR REVIEW ALL MEDS BY PRESCRIBER/CLIN PHARMACIST DOCUMENTED: ICD-10-PCS | Mod: CPTII,S$GLB,, | Performed by: NURSE PRACTITIONER

## 2022-04-12 PROCEDURE — 3046F PR MOST RECENT HEMOGLOBIN A1C LEVEL > 9.0%: ICD-10-PCS | Mod: CPTII,S$GLB,, | Performed by: NURSE PRACTITIONER

## 2022-04-12 PROCEDURE — 1159F PR MEDICATION LIST DOCUMENTED IN MEDICAL RECORD: ICD-10-PCS | Mod: CPTII,S$GLB,, | Performed by: NURSE PRACTITIONER

## 2022-04-12 PROCEDURE — 99214 PR OFFICE/OUTPT VISIT, EST, LEVL IV, 30-39 MIN: ICD-10-PCS | Mod: S$GLB,,, | Performed by: NURSE PRACTITIONER

## 2022-04-12 PROCEDURE — 82962 GLUCOSE BLOOD TEST: CPT | Mod: S$GLB,,, | Performed by: NURSE PRACTITIONER

## 2022-04-12 PROCEDURE — 3046F HEMOGLOBIN A1C LEVEL >9.0%: CPT | Mod: CPTII,S$GLB,, | Performed by: NURSE PRACTITIONER

## 2022-04-12 PROCEDURE — 1160F RVW MEDS BY RX/DR IN RCRD: CPT | Mod: CPTII,S$GLB,, | Performed by: NURSE PRACTITIONER

## 2022-04-12 PROCEDURE — 3077F PR MOST RECENT SYSTOLIC BLOOD PRESSURE >= 140 MM HG: ICD-10-PCS | Mod: CPTII,S$GLB,, | Performed by: NURSE PRACTITIONER

## 2022-04-12 PROCEDURE — 3077F SYST BP >= 140 MM HG: CPT | Mod: CPTII,S$GLB,, | Performed by: NURSE PRACTITIONER

## 2022-04-12 PROCEDURE — 1159F MED LIST DOCD IN RCRD: CPT | Mod: CPTII,S$GLB,, | Performed by: NURSE PRACTITIONER

## 2022-04-12 RX ORDER — REGADENOSON 0.08 MG/ML
INJECTION, SOLUTION INTRAVENOUS
COMMUNITY
Start: 2022-01-04 | End: 2022-07-12 | Stop reason: ALTCHOICE

## 2022-04-12 NOTE — PROGRESS NOTES
Outpatient Care Management  Plan of Care Follow Up Visit    Patient: Avril Monzon  MRN: [id number]  Date of Service: 04/12/2022  Completed by: Debbie Lopez RN  Referral Date: 12/20/2021  Program: Case Management (High Risk)    No chief complaint on file.      Brief Summary: Phone contact with Mrs Johnson today and she requested that this CM message Mrs Mackenzie's Diab NP, CHIP Mistry, to advise Mrs Mackenzie does not take her AM insulin until after dialysis which finishes at 2 PM on M, W and F so is not taking her PM dose at all on those days to avoid nite time hypoglycemia episodes. Advised this CM will message CHIP Mistry to advise of this and will plan to follow up with Mrs Mackenzie in about 3 weeks as will be out of the office in 2 weeks when contact is scheduled.     Patient Summary     Involvement of Care:  Do I have permission to speak with other family members about your care?       Patient Reported Labs & Vitals:  1.  Any Patient Reported Labs & Vitals?     2.  Patient Reported Blood Pressure:     3.  Patient Reported Pulse:     4.  Patient Reported Weight (Kg):     5.  Patient Reported Blood Glucose (mg/dl):       Medical and social history was reviewed with patient and/or caregiver.     Clinical Assessment     Reviewed and provided basic information on available community resources for mental health, transportation, wellness resources, and palliative care programs with patient and/or caregiver.     Complex Care Plan     Care plan was discussed and completed today with input from patient and/or caregiver.    Patient Instructions     Instructions were provided via the [employer] patient resources and are available for the patient to view on the patient portal.    Next Steps: Continue to attempt to educate about measures to help with managing CHF and diabetes. Debbie Lopez RN    No follow-ups on file.    Todays OPCM Self-Management Care Plan was developed with the patients/caregivers input and was based on  identified barriers from todays assessment.  Goals were written today with the patient/caregiver and the patient has agreed to work towards these goals to improve his/her overall well-being. Patient verbalized understanding of the care plan, goals, and all of today's instructions. Encouraged patient/caregiver to communicate with his/her physician and health care team about health conditions and the treatment plan.  Provided my contact information today and encouraged patient/caregiver to call me with any questions as needed.

## 2022-04-12 NOTE — PROGRESS NOTES
PCP: Rose Parks MD    Subjective:     Chief Complaint: Diabetes follow up.  Last visit with me: 3/8/2022    HPI: 77 y.o.  female for diabetes follow up.   Previously managed by JACK Pierce.   Last clinic visit 1/2016.  Type II diabetes since age 44.  Comorbidities: HTN, HLD, CHF, ESRD, CVA, MI, Dialysis Use - Sched MWF 7a-12noon, Legally blind.  ESRD on dialysis x 2 years    Family History of Type 1/2 DM: mother  Known diabetes complications:  DKA/HHS: no  Retinopathy: yes  Peripheral neuropathy: yes  Nephropathy: yes    Interval history:  Denies recent hospital admissions or ER visits.   Denies having hypoglycemia.   Endorses diabetes related symptoms: Tingling in Lower Extremities. Both fingers    Admits to missing mealtime insulin at times due to forgetting.    Blood glucose monitoring via fingerstick: Not done.  Per pt, received 'supplies', not sure if testing supplies.  No BG log for review.    Activity: Sedentary  Diet: 2 meals/day; infrequent snacks/day.    Medication compliance all of the time, diet compliance most of the time.   To be enrolled in diabetes education classes.     Past failed treatment:  none    Current DM Medications:   - Humulin 70/30,  28 units in am, 8 units in the evening    Allergies  Review of patient's allergies indicates:   Allergen Reactions    Codeine Swelling    Darvon [propoxyphene] Swelling    Atorvastatin      Muscle twitching     Labs Reviewed:  Her most recent A1C is:  Lab Results   Component Value Date    HGBA1C 10.8 (H) 03/08/2022    HGBA1C 8.6 (H) 10/19/2021    HGBA1C 7.3 04/26/2021     Her most recent BMP is:  Lab Results   Component Value Date     12/21/2021    K 4.5 12/21/2021     12/21/2021    CO2 21 (L) 12/21/2021    BUN 25 (H) 12/21/2021    CREATININE 4.3 (H) 12/21/2021    CALCIUM 8.7 12/21/2021    ANIONGAP 13 12/21/2021    ESTGFRAFRICA 11 (A) 12/21/2021    EGFRNONAA 10 (A) 12/21/2021       No results found for: CPEPTIDE  No  results found for: GLUTAMICACID    There is no height or weight on file to calculate BMI.    Weight gain 0 lbs since last visit.  Wt Readings from Last 3 Encounters:   22 1054 59 kg (130 lb)   22 1510 59.4 kg (130 lb 15.3 oz)   22 0822 59.4 kg (130 lb 15.3 oz)        Her blood sugar in clinic today is: 207    Lab Results   Component Value Date    POCGLU 74 2016     Diabetes Management Status  Statin: Not taking  ACE/ARB: Not taking    Screening or Prevention Patient's value Goal Complete/Controlled?   HgA1C Testing and Control   Lab Results   Component Value Date    HGBA1C 10.8 (H) 2022      Annually/Less than 8% No   Lipid profile : 2022 Annually Yes   LDL control Lab Results   Component Value Date    LDLCALC 159.6 (H) 10/19/2021    Annually/Less than 100 mg/dl  No   Nephropathy screening Lab Results   Component Value Date    MICALBCREAT 1244.0 (H) 2018     Lab Results   Component Value Date    PROTEINUA 2+ (A) 2022    Annually Yes   Blood pressure BP Readings from Last 1 Encounters:   22 (!) 142/40    Less than 140/90 No   Dilated retinal exam : 2021 Annually Yes    Foot exam   : 2022 Annually Yes     Social History     Socioeconomic History    Marital status:    Tobacco Use    Smoking status: Former Smoker     Packs/day: 1.50     Years: 30.00     Pack years: 45.00     Types: Cigarettes     Quit date: 1990     Years since quittin.2    Smokeless tobacco: Former User   Substance and Sexual Activity    Alcohol use: No     Alcohol/week: 0.0 standard drinks    Drug use: No    Sexual activity: Not Currently     Past Medical History:   Diagnosis Date    Acute hypoxemic respiratory failure 2018    Anemia     Arthritis     back, hand, knees    Back pain     Cataract     CKD stage G4/A3, GFR 15 - 29 and albumin creatinine ratio >300 mg/g     GFR 40% 2014 and 33% ub 3/2014 ([facility])    Coronary artery disease involving native  coronary artery of native heart 11/30/2018    Diabetic retinopathy     DM (diabetes mellitus) type II controlled, neurological manifestation     Exudative age-related macular degeneration of left eye with active choroidal neovascularization 2/5/2019    GERD (gastroesophageal reflux disease)     Glaucoma     Heart failure     Hyperlipidemia     Hypertension     Non-ST elevation MI (NSTEMI) 11/28/2018    NSTEMI (non-ST elevated myocardial infarction) 11/27/2018    Peripheral neuropathy     Polyneuropathy     Proteinuria     >6 mo     Seizures     [facility] 1/2014 -dick CT [place] showed small vessel    Stroke 2012,2014    Tobacco dependence     Type 2 diabetes with peripheral circulatory disorder, controlled      Review of Systems   Constitutional: Negative for activity change, appetite change, fatigue and unexpected weight change.   HENT: Negative for dental problem and trouble swallowing.    Eyes: Negative for visual disturbance.   Respiratory: Negative for chest tightness and shortness of breath.    Cardiovascular: Negative for chest pain, palpitations and leg swelling.   Gastrointestinal: Negative for abdominal pain, constipation, diarrhea, nausea and vomiting.   Endocrine: Negative for polydipsia, polyphagia and polyuria.   Genitourinary: Negative for difficulty urinating, dysuria, frequency and urgency.        Negative yeast infection   Musculoskeletal: Negative for gait problem, myalgias and neck pain.   Integumentary:  Negative for rash and wound.   Allergic/Immunologic: Negative.    Neurological: Positive for numbness (intermittent both feet). Negative for dizziness, syncope, weakness and headaches.        Int tingling BLE, hands   Hematological: Negative.    Psychiatric/Behavioral: Negative for confusion and sleep disturbance.   All other systems reviewed and are negative.     Objective:      Physical Exam  Vitals reviewed.   Constitutional:       Appearance: Normal appearance.   HENT:      Head:  Normocephalic and atraumatic.   Eyes:      General: Vision grossly intact.      Extraocular Movements: Extraocular movements intact.      Pupils: Pupils are equal, round, and reactive to light.   Neck:      Thyroid: No thyroid mass or thyroid tenderness.   Cardiovascular:      Rate and Rhythm: Normal rate and regular rhythm.      Pulses: Normal pulses.           Radial pulses are 2+ on the right side and 2+ on the left side.        Dorsalis pedis pulses are 2+ on the right side and 2+ on the left side.      Heart sounds: Normal heart sounds.      Arteriovenous access: left arteriovenous access is present.     Comments: Left upper arm AVF + thrill, + bruit  Pulmonary:      Effort: Pulmonary effort is normal.      Breath sounds: Normal breath sounds.   Abdominal:      General: Bowel sounds are normal.      Palpations: Abdomen is soft.   Musculoskeletal:         General: Normal range of motion.      Cervical back: Normal range of motion and neck supple.      Right lower leg: No edema.      Left lower leg: No edema.      Right foot: No deformity or bunion.      Left foot: No deformity or bunion.   Feet:      Right foot:      Skin integrity: Dry skin present. No ulcer, skin breakdown or callus.      Toenail Condition: Right toenails are abnormally thick.      Left foot:      Skin integrity: Dry skin present. No ulcer, skin breakdown or callus.      Toenail Condition: Left toenails are abnormally thick.   Lymphadenopathy:      Cervical: No cervical adenopathy.   Skin:     General: Skin is warm and dry.      Findings: No rash or wound.      Comments: Negative for acanthosis nigricans. Well-healed SQ injection sites.   Neurological:      Mental Status: She is alert and oriented to person, place, and time.      Coordination: Coordination is intact.      Gait: Gait is intact.   Psychiatric:         Attention and Perception: Attention normal.         Mood and Affect: Mood normal.         Speech: Speech normal.          Behavior: Behavior normal. Behavior is cooperative.         Thought Content: Thought content normal.         Cognition and Memory: Cognition normal.     Assessment / Plan:     Avril was seen today for diabetes mellitus.    Diagnoses and all orders for this visit:    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, with long-term current use of insulin  -     POCT Glucose, Hand-Held Device    Hypertension associated with diabetes   - /70, manual reading in clinic this morning   - Pt states took Lopressor 50 mg this am as scheduled   - Been out of refill for Bidil x 5 days   - Will coordinate with Dr Bryan for RX refill     Hyperlipidemia associated with type 2 diabetes mellitus    History of CVA (cerebrovascular accident)    ESRD (end stage renal disease)    Additional Plan Details:  - Condition: uncontrolled, most recent A1c of 10.8% was completed 1 month ago.   - Monitor blood glucose:  2x daily.Goals reviewed. Maintain BG log.   - Hypoglycemia protocol: Reviewed and risk discussed.  Notify if BG readings below 80. Protocol printout provided.   - Diet: Limit carbohydrate intake 30-45 grams/meal, 3x daily and 15 grams/snack, limit 2/day.   - Activity: Recommend at least 150 minutes of exercise in a week.  - BP and LDL: Recommend lifestyle modifications and follow up with PCP for management.   - Medication Changes:    - Continue Humulin 70/30 28 units in am and 8 units in the evening    - Medication consideration: Unable to adjust insulin dose due to pt not checking BG. Emphasized on consistency administering insulin.  - Lab results: A1C discussed.  - Health Maintenance standards addressed today:  A1c to be scheduled.   - Risks of uncontrolled DM explained. Importance of routine foot and eye exams reviewed. Scheduled as appropriate.  -The patient was explained the above plan and given opportunity to ask questions.  She understands, chooses and consents to this plan and accepts all  the risks, which include but are not limited to the risks mentioned above.   - Labs ordered as above.  - CDE referral/follow up: Scheduled  - Nurse visit: 2 days to check testing supplies, teaching on how to do blood sugar checks with new meter.  - Follow up: 3 weeks to monitor BG readings and determine insulin dose adjustments.        Charlotte T. Delacruz, FNP-C [facility]    A total of 30 minutes was spent in face to face time, of which over 50 % was spent in counseling patient on disease process, complications, treatment, and side effects of medications.

## 2022-04-12 NOTE — TELEPHONE ENCOUNTER
----- Message from Nova Jordan MA sent at 4/11/2022  4:25 PM CDT -----  Contact: Pt's daughter Elizabeth     ----- Message -----  From: Dominic Santiago  Sent: 4/11/2022   3:57 PM CDT  To: Jackie Atkinson Staff    Pt daughter called in regards to medication BIDIL 20-37.5 mg Tab she states [facility] states this script will need to be sent in as a new script for the Insurance to filled patient is out please send to     [address] [place] - [address] [address]   Phone:  [phone]  Fax:  [phone]

## 2022-04-12 NOTE — TELEPHONE ENCOUNTER
Called and spoke with patient's daughter who states that patient needs  Refill on Bidil.It's not listed on her chart but she is a dialysis pt. Please advise.

## 2022-04-13 ENCOUNTER — TELEPHONE (OUTPATIENT)
Dept: CARDIOLOGY | Facility: CLINIC | Age: 77
End: 2022-04-13
Payer: MEDICARE

## 2022-04-13 DIAGNOSIS — N18.6 ESRD (END STAGE RENAL DISEASE): ICD-10-CM

## 2022-04-13 DIAGNOSIS — I15.2 HYPERTENSION ASSOCIATED WITH DIABETES: Primary | Chronic | ICD-10-CM

## 2022-04-13 DIAGNOSIS — E11.59 HYPERTENSION ASSOCIATED WITH DIABETES: Primary | Chronic | ICD-10-CM

## 2022-04-13 DIAGNOSIS — Z76.0 MEDICATION REFILL: ICD-10-CM

## 2022-04-13 RX ORDER — HYDRALAZINE HYDROCHLORIDE AND ISOSORBIDE DINITRATE 37.5; 2 MG/1; MG/1
2 TABLET, FILM COATED ORAL 3 TIMES DAILY
Qty: 180 TABLET | Refills: 3 | Status: SHIPPED | OUTPATIENT
Start: 2022-04-13 | End: 2022-04-13

## 2022-04-13 RX ORDER — ISOSORBIDE DINITRATE AND HYDRALAZINE HYDROCHLORIDE 37.5; 2 MG/1; MG/1
1 TABLET ORAL 3 TIMES DAILY
Qty: 90 TABLET | Refills: 3 | Status: SHIPPED | OUTPATIENT
Start: 2022-04-13 | End: 2022-07-01 | Stop reason: SDUPTHER

## 2022-04-13 NOTE — TELEPHONE ENCOUNTER
Spoke with Elizabeth and explained that she needed to contact Dr. Bryan's office regarding her Bidil.     ----- Message from Jana Fernández sent at 4/13/2022  4:41 PM CDT -----  Contact: Elizabeth/ Daughter  Elizabeth would like a call back at [phone], in regards to pt BIDIL 20-37.5 mg Tab medication. She states that she has been trying to get the medication refilled, and the patient has been without the medication for 10 days.

## 2022-04-14 ENCOUNTER — TELEPHONE (OUTPATIENT)
Dept: ADMINISTRATIVE | Facility: HOSPITAL | Age: 77
End: 2022-04-14
Payer: MEDICARE

## 2022-04-14 ENCOUNTER — TELEPHONE (OUTPATIENT)
Dept: FAMILY MEDICINE | Facility: CLINIC | Age: 77
End: 2022-04-14
Payer: MEDICARE

## 2022-04-14 DIAGNOSIS — Z12.11 COLON CANCER SCREENING: Primary | ICD-10-CM

## 2022-04-14 NOTE — TELEPHONE ENCOUNTER
----- Message from Lizzette Mcgowan sent at 4/14/2022 10:27 AM CDT -----  Contact: SERVANDO BUSTILLO [[id number]]  .Type:  Needs Medical Advice    Who Called: SERVANDO BUSTILLO [[id number]]  Would the patient rather a call back or a response via [facility]? Call   Best Call Back Number: .[phone] (home)    Additional Information: Pt is req a call back in regards to a medication the provider prescribed to her. She states no [facility] has the medication and needs to know what she needs to do at this point.. Thanks AW

## 2022-04-15 ENCOUNTER — TELEPHONE (OUTPATIENT)
Dept: PHARMACY | Facility: CLINIC | Age: 77
End: 2022-04-15
Payer: MEDICARE

## 2022-04-18 ENCOUNTER — TELEPHONE (OUTPATIENT)
Dept: DIABETES | Facility: CLINIC | Age: 77
End: 2022-04-18
Payer: MEDICARE

## 2022-04-18 NOTE — TELEPHONE ENCOUNTER
S/w pt informed pt spoke with pharmacy and they said they put the medication back on the shelf because pt never came to pick it up. Advised pt to call pharmacy and let them know she will be there to pick it up.     Pt verbalized understanding.

## 2022-04-18 NOTE — TELEPHONE ENCOUNTER
Left vm for patient daughter informing her that [facility] will be contacting her to verify information regarding her mother's Piper 2 order.

## 2022-04-28 ENCOUNTER — LAB VISIT (OUTPATIENT)
Dept: LAB | Facility: HOSPITAL | Age: 77
End: 2022-04-28
Attending: ALLERGY & IMMUNOLOGY
Payer: MEDICARE

## 2022-04-28 ENCOUNTER — OFFICE VISIT (OUTPATIENT)
Dept: ALLERGY | Facility: CLINIC | Age: 77
End: 2022-04-28
Payer: MEDICARE

## 2022-04-28 VITALS
SYSTOLIC BLOOD PRESSURE: 134 MMHG | HEART RATE: 67 BPM | WEIGHT: 130 LBS | BODY MASS INDEX: 21.66 KG/M2 | HEIGHT: 65 IN | DIASTOLIC BLOOD PRESSURE: 58 MMHG

## 2022-04-28 DIAGNOSIS — T78.49XS OTHER ALLERGY, SEQUELA: ICD-10-CM

## 2022-04-28 DIAGNOSIS — L29.9 ITCHING: Primary | ICD-10-CM

## 2022-04-28 PROCEDURE — 86682 HELMINTH ANTIBODY: CPT | Mod: 91 | Performed by: ALLERGY & IMMUNOLOGY

## 2022-04-28 PROCEDURE — 99214 PR OFFICE/OUTPT VISIT, EST, LEVL IV, 30-39 MIN: ICD-10-PCS | Mod: S$GLB,,, | Performed by: ALLERGY & IMMUNOLOGY

## 2022-04-28 PROCEDURE — 84165 PATHOLOGIST INTERPRETATION SPE: ICD-10-PCS | Mod: 26,ICN,, | Performed by: PATHOLOGY

## 2022-04-28 PROCEDURE — 1126F PR PAIN SEVERITY QUANTIFIED, NO PAIN PRESENT: ICD-10-PCS | Mod: CPTII,S$GLB,, | Performed by: ALLERGY & IMMUNOLOGY

## 2022-04-28 PROCEDURE — 99499 RISK ADDL DX/OHS AUDIT: ICD-10-PCS | Mod: S$GLB,,, | Performed by: ALLERGY & IMMUNOLOGY

## 2022-04-28 PROCEDURE — 86038 ANTINUCLEAR ANTIBODIES: CPT | Performed by: ALLERGY & IMMUNOLOGY

## 2022-04-28 PROCEDURE — 86003 ALLG SPEC IGE CRUDE XTRC EA: CPT | Performed by: ALLERGY & IMMUNOLOGY

## 2022-04-28 PROCEDURE — 82785 ASSAY OF IGE: CPT | Performed by: ALLERGY & IMMUNOLOGY

## 2022-04-28 PROCEDURE — 1159F MED LIST DOCD IN RCRD: CPT | Mod: CPTII,S$GLB,, | Performed by: ALLERGY & IMMUNOLOGY

## 2022-04-28 PROCEDURE — 86592 SYPHILIS TEST NON-TREP QUAL: CPT | Performed by: ALLERGY & IMMUNOLOGY

## 2022-04-28 PROCEDURE — 1101F PT FALLS ASSESS-DOCD LE1/YR: CPT | Mod: CPTII,S$GLB,, | Performed by: ALLERGY & IMMUNOLOGY

## 2022-04-28 PROCEDURE — 3288F FALL RISK ASSESSMENT DOCD: CPT | Mod: CPTII,S$GLB,, | Performed by: ALLERGY & IMMUNOLOGY

## 2022-04-28 PROCEDURE — 86803 HEPATITIS C AB TEST: CPT | Performed by: ALLERGY & IMMUNOLOGY

## 2022-04-28 PROCEDURE — 1159F PR MEDICATION LIST DOCUMENTED IN MEDICAL RECORD: ICD-10-PCS | Mod: CPTII,S$GLB,, | Performed by: ALLERGY & IMMUNOLOGY

## 2022-04-28 PROCEDURE — 99499 UNLISTED E&M SERVICE: CPT | Mod: S$GLB,,, | Performed by: ALLERGY & IMMUNOLOGY

## 2022-04-28 PROCEDURE — 3078F PR MOST RECENT DIASTOLIC BLOOD PRESSURE < 80 MM HG: ICD-10-PCS | Mod: CPTII,S$GLB,, | Performed by: ALLERGY & IMMUNOLOGY

## 2022-04-28 PROCEDURE — 3075F SYST BP GE 130 - 139MM HG: CPT | Mod: CPTII,S$GLB,, | Performed by: ALLERGY & IMMUNOLOGY

## 2022-04-28 PROCEDURE — 3046F HEMOGLOBIN A1C LEVEL >9.0%: CPT | Mod: CPTII,S$GLB,, | Performed by: ALLERGY & IMMUNOLOGY

## 2022-04-28 PROCEDURE — 3046F PR MOST RECENT HEMOGLOBIN A1C LEVEL > 9.0%: ICD-10-PCS | Mod: CPTII,S$GLB,, | Performed by: ALLERGY & IMMUNOLOGY

## 2022-04-28 PROCEDURE — 36415 COLL VENOUS BLD VENIPUNCTURE: CPT | Performed by: ALLERGY & IMMUNOLOGY

## 2022-04-28 PROCEDURE — 99999 PR PBB SHADOW E&M-EST. PATIENT-LVL IV: CPT | Mod: PBBFAC,,, | Performed by: ALLERGY & IMMUNOLOGY

## 2022-04-28 PROCEDURE — 3288F PR FALLS RISK ASSESSMENT DOCUMENTED: ICD-10-PCS | Mod: CPTII,S$GLB,, | Performed by: ALLERGY & IMMUNOLOGY

## 2022-04-28 PROCEDURE — 86376 MICROSOMAL ANTIBODY EACH: CPT | Performed by: ALLERGY & IMMUNOLOGY

## 2022-04-28 PROCEDURE — 3078F DIAST BP <80 MM HG: CPT | Mod: CPTII,S$GLB,, | Performed by: ALLERGY & IMMUNOLOGY

## 2022-04-28 PROCEDURE — 1101F PR PT FALLS ASSESS DOC 0-1 FALLS W/OUT INJ PAST YR: ICD-10-PCS | Mod: CPTII,S$GLB,, | Performed by: ALLERGY & IMMUNOLOGY

## 2022-04-28 PROCEDURE — 84165 PROTEIN E-PHORESIS SERUM: CPT | Mod: 26,ICN,, | Performed by: PATHOLOGY

## 2022-04-28 PROCEDURE — 86705 HEP B CORE ANTIBODY IGM: CPT | Performed by: ALLERGY & IMMUNOLOGY

## 2022-04-28 PROCEDURE — 3075F PR MOST RECENT SYSTOLIC BLOOD PRESS GE 130-139MM HG: ICD-10-PCS | Mod: CPTII,S$GLB,, | Performed by: ALLERGY & IMMUNOLOGY

## 2022-04-28 PROCEDURE — 80053 COMPREHEN METABOLIC PANEL: CPT | Performed by: ALLERGY & IMMUNOLOGY

## 2022-04-28 PROCEDURE — 84165 PROTEIN E-PHORESIS SERUM: CPT | Performed by: ALLERGY & IMMUNOLOGY

## 2022-04-28 PROCEDURE — 83520 IMMUNOASSAY QUANT NOS NONAB: CPT | Performed by: ALLERGY & IMMUNOLOGY

## 2022-04-28 PROCEDURE — 84443 ASSAY THYROID STIM HORMONE: CPT | Performed by: ALLERGY & IMMUNOLOGY

## 2022-04-28 PROCEDURE — 99214 OFFICE O/P EST MOD 30 MIN: CPT | Mod: S$GLB,,, | Performed by: ALLERGY & IMMUNOLOGY

## 2022-04-28 PROCEDURE — 87389 HIV-1 AG W/HIV-1&-2 AB AG IA: CPT | Performed by: ALLERGY & IMMUNOLOGY

## 2022-04-28 PROCEDURE — 86682 HELMINTH ANTIBODY: CPT | Performed by: ALLERGY & IMMUNOLOGY

## 2022-04-28 PROCEDURE — 99999 PR PBB SHADOW E&M-EST. PATIENT-LVL IV: ICD-10-PCS | Mod: PBBFAC,,, | Performed by: ALLERGY & IMMUNOLOGY

## 2022-04-28 PROCEDURE — 1126F AMNT PAIN NOTED NONE PRSNT: CPT | Mod: CPTII,S$GLB,, | Performed by: ALLERGY & IMMUNOLOGY

## 2022-04-28 PROCEDURE — 86003 ALLG SPEC IGE CRUDE XTRC EA: CPT | Mod: 59 | Performed by: ALLERGY & IMMUNOLOGY

## 2022-04-28 RX ORDER — MONTELUKAST SODIUM 10 MG/1
10 TABLET ORAL NIGHTLY
Qty: 30 TABLET | Refills: 5 | Status: SHIPPED | OUTPATIENT
Start: 2022-04-28 | End: 2022-05-28

## 2022-04-28 NOTE — PROGRESS NOTES
"Subjective:       Patient ID: Avril Monzon is a 77 y.o. female.    Chief Complaint:  Follow-up      HPI 2/8/2022: 77 year old female referred with a history of itching.  She reports itching everywhere except for her face. She reports having symptoms for 5 years.  She has tried allergy pills, lotion and vasoline.  She reports that lotion and vasoline help to stop the itch.  She denies a rash.  She is diabetic and on dialysis.  Dove soap  Cream- unsure of the name.      HPI today: 77 year old female complaining of itching greater than five years (10-15 years). She is diabetic on dialysis.  She reports taking an "allergy pill." She uses Dove unscented. Cream- "anything out the drug store".  NO rash  "inner" itch            Past Medical History:   Diagnosis Date    Acute hypoxemic respiratory failure 11/26/2018    Anemia     Arthritis     back, hand, knees    Back pain     Cataract     CKD stage G4/A3, GFR 15 - 29 and albumin creatinine ratio >300 mg/g     GFR 40% Jan 2014 and 33% ub 3/2014 ([facility])    Coronary artery disease involving native coronary artery of native heart 11/30/2018    Diabetic retinopathy     DM (diabetes mellitus) type II controlled, neurological manifestation     Exudative age-related macular degeneration of left eye with active choroidal neovascularization 2/5/2019    GERD (gastroesophageal reflux disease)     Glaucoma     Heart failure     Hyperlipidemia     Hypertension     Non-ST elevation MI (NSTEMI) 11/28/2018    NSTEMI (non-ST elevated myocardial infarction) 11/27/2018    Peripheral neuropathy     Polyneuropathy     Proteinuria     >6 mo     Seizures     [facility] 1/2014 -dick CT [place] showed small vessel    Stroke 2012,2014    Tobacco dependence     Type 2 diabetes with peripheral circulatory disorder, controlled        Family History   Problem Relation Age of Onset    Diabetes Mother     Hyperlipidemia Mother     Hypertension Mother     Heart disease Mother         MI    " Muscular dystrophy Son     Cancer Maternal Grandmother         colon       Current Outpatient Medications on File Prior to Visit   Medication Sig Dispense Refill    ACCU-CHEK [place] PLUS METER [facility] USE TO TEST TWICE DAILY  0    blood sugar diagnostic Strp 1 strip by Misc.(Non-Drug; Combo Route) route 3 (three) times daily. relion ultima 100 strip 11    blood sugar diagnostic Strp To check BG 2 times daily, to use with insurance preferred meter 100 each 6    blood-glucose meter kit To check BG 2 times daily, to use with insurance preferred meter 1 each 0    cyanocobalamin (VITAMIN B-12) 1000 MCG tablet Take 100 mcg by mouth once daily.      erythromycin (ROMYCIN) ophthalmic ointment PLACE A SMALL AMOUNT INTO LEFT EYE THREE TIMES DAILY AS DIRECTED      folic acid (FOLVITE) 1 MG tablet Take 1 mg by mouth once daily.      hydrOXYzine HCL (ATARAX) 25 MG tablet Take 1 tablet (25 mg total) by mouth 3 (three) times daily as needed for Itching. 90 tablet 2    insulin NPH/Reg human (HUMULIN, 70/30,) 100 unit/mL (70-30) InPn pen To do 28 units subq in AM and 8 units subq in PM.  Dose to be done within 15 min before or after meal. (Patient taking differently: To do 28 units subq in AM and 8 units subq in PM.  Dose to be done within 15 min before or after meal.) 3 each 1    isosorbide-hydrALAZINE 20-37.5 mg (BIDIL) 20-37.5 mg Tab Take 1 tablet by mouth 3 (three) times daily. 90 tablet 3    lancets (ACCU-CHEK SOFTCLIX LANCETS) Misc 1 lancet by Misc.(Non-Drug; Combo Route) route 3 (three) times daily. 100 each 11    lancets Misc To check BG 2 times daily, to use with insurance preferred meter 100 each 6    LEXISCAN 0.4 mg/5 mL Syrg       metoprolol tartrate (LOPRESSOR) 50 MG tablet Take 1 tablet (50 mg total) by mouth 2 (two) times daily. 60 tablet 6    triamcinolone acetonide 0.1% (KENALOG) 0.1 % cream Apply topically 2 (two) times daily. 80 g 2    aspirin (ECOTRIN) 81 MG EC tablet Take 1 tablet (81 mg total) by  mouth once daily.  0    clopidogreL (PLAVIX) 75 mg tablet Take 1 tablet (75 mg total) by mouth once daily. 60 tablet 0     No current facility-administered medications on file prior to visit.       Review of patient's allergies indicates:   Allergen Reactions    Codeine Swelling    Darvon [propoxyphene] Swelling    Atorvastatin      Muscle twitching             Environmental History: Pets in the home: dogs (1). She does not smoke.  Review of Systems   Constitutional: Negative for chills and fever.   HENT: Positive for rhinorrhea. Negative for congestion.    Eyes: Negative for discharge and itching.   Respiratory: Negative for chest tightness, shortness of breath and wheezing.    Cardiovascular: Negative for chest pain and leg swelling.   Gastrointestinal: Negative for nausea and vomiting.   Endocrine: Negative for cold intolerance and heat intolerance.   Skin: Negative for rash.        itching   Allergic/Immunologic: Negative for environmental allergies and food allergies.   Neurological: Negative for facial asymmetry and speech difficulty.   Hematological: Negative for adenopathy. Does not bruise/bleed easily.   Psychiatric/Behavioral: Negative for behavioral problems and suicidal ideas.        Objective:    Physical Exam  Vitals reviewed.   Constitutional:       General: She is not in acute distress.     Appearance: Normal appearance. She is well-developed. She is not ill-appearing, toxic-appearing or diaphoretic.   HENT:      Head: Normocephalic and atraumatic.      Right Ear: Tympanic membrane, ear canal and external ear normal. There is no impacted cerumen.      Left Ear: Tympanic membrane, ear canal and external ear normal. There is no impacted cerumen.      Nose: Nose normal. No congestion or rhinorrhea.      Mouth/Throat:      Pharynx: No oropharyngeal exudate or posterior oropharyngeal erythema.   Eyes:      General: No scleral icterus.        Right eye: No discharge.         Left eye: No discharge.       Pupils: Pupils are equal, round, and reactive to light.   Neck:      Thyroid: No thyromegaly.   Cardiovascular:      Rate and Rhythm: Normal rate and regular rhythm.      Heart sounds: Normal heart sounds. No murmur heard.    No friction rub. No gallop.   Pulmonary:      Effort: Pulmonary effort is normal. No respiratory distress.      Breath sounds: Normal breath sounds. No stridor. No wheezing, rhonchi or rales.   Chest:      Chest wall: No tenderness.   Abdominal:      General: Bowel sounds are normal. There is no distension.      Palpations: Abdomen is soft. There is no mass.      Tenderness: There is no abdominal tenderness. There is no guarding or rebound.      Hernia: No hernia is present.   Musculoskeletal:         General: No swelling, tenderness, deformity or signs of injury. Normal range of motion.      Cervical back: Normal range of motion and neck supple. No rigidity or tenderness. No muscular tenderness.      Right lower leg: No edema.      Left lower leg: No edema.   Lymphadenopathy:      Cervical: No cervical adenopathy.   Skin:     General: Skin is warm.      Coloration: Skin is not jaundiced or pale.      Findings: No bruising or erythema.   Neurological:      Mental Status: She is alert and oriented to person, place, and time.      Gait: Gait normal.   Psychiatric:         Mood and Affect: Mood normal.         Behavior: Behavior normal.         Thought Content: Thought content normal.         Judgment: Judgment normal.           Assessment:       1. Itching    2. Other allergy, sequela          Plan:       Itching  -     montelukast (SINGULAIR) 10 mg tablet; Take 1 tablet (10 mg total) by mouth every evening.  Dispense: 30 tablet; Refill: 5    Other allergy, sequela   -     Strongyloides IgG Antibodies; Future; Expected date: 04/28/2022  -     Toxocara antibody; Future; Expected date: 04/28/2022  -     Tryptase; Future; Expected date: 04/28/2022  -     CU (Chronic Urticaria) Index Panel; Future;  Expected date: 04/28/2022  -     DANIEL Screen w/Reflex; Future; Expected date: 04/28/2022  -     Protein Electrophoresis, Serum; Future; Expected date: 04/28/2022  -     RPR; Future; Expected date: 04/28/2022  -     Comprehensive Metabolic Panel; Future; Expected date: 04/28/2022  -     HIV 1/2 Ag/Ab (4th Gen); Future; Expected date: 04/28/2022  -     Hepatitis C Antibody; Future; Expected date: 04/28/2022  -     Hepatitis B Core Antibody, IgM; Future; Expected date: 04/28/2022  -     IgE; Future; Expected date: 04/28/2022  -     Bahia grass IgE; Future; Expected date: 04/28/2022  -     Aspergillus fumagatus IgE; Future; Expected date: 04/28/2022  -     Chaetomium globosum IgE; Future; Expected date: 04/28/2022  -     Cockroach, American IgE; Future; Expected date: 04/28/2022  -     Cladosporium IgE; Future; Expected date: 04/28/2022  -     Curvularia lunata IgE; Future; Expected date: 04/28/2022  -     D. farinae IgE; Future; Expected date: 04/28/2022  -     D. pteronyssinus IgE; Future; Expected date: 04/28/2022  -     Dog dander IgE; Future; Expected date: 04/28/2022  -     Plantain, English IgE; Future; Expected date: 04/28/2022  -     Eucalyptus IgE; Future; Expected date: 04/28/2022  -     Rivera elder, rough IgE; Future; Expected date: 04/28/2022  -     Mugwort IgE; Future; Expected date: 04/28/2022  -     Nettle IgE; Future; Expected date: 04/28/2022  -     Orchard grass IgE; Future; Expected date: 04/28/2022  -     [place], western white IgE; Future; Expected date: 04/28/2022  -     Privet, common IgE; Future; Expected date: 04/28/2022  -     Ragweed, short, common IgE; Future; Expected date: 04/28/2022  -     Red top grass IgE; Future; Expected date: 04/28/2022  -     Rye grass, cultivated IgE; Future; Expected date: 04/28/2022  -     Nain Block IgE; Future; Expected date: 04/28/2022  -     Stemphyllium IgE; Future; Expected date: 04/28/2022  -     Tawanda IgE; Future; Expected date: 04/28/2022  -      Artem grass IgE; Future; Expected date: 04/28/2022  -     Allergen, Hackberry Celtis; Future; Expected date: 04/28/2022  -     Allergen, Elm Cedar; Future; Expected date: 04/28/2022  -     Allergen-[place]; Future; Expected date: 04/28/2022  -     Allergen, Pecan Tree IgE; Future; Expected date: 04/28/2022  -     [place], black IgE; Future; Expected date: 04/28/2022  -     [place], bald IgE; Future; Expected date: 04/28/2022  -     Oak, white IgE; Future; Expected date: 04/28/2022  -     Allergen, Cocklebur; Future; Expected date: 04/28/2022  -     Cat epithelium IgE; Future; Expected date: 04/28/2022  -     RAST Allergen for [place]; Future; Expected date: 04/28/2022  -     RAST Allergen Maple ([place]); Future; Expected date: 04/28/2022  -     Allergen, Meadow Grass ([place] Healthy Blue); Future; Expected date: 04/28/2022  -     Allergen-Silver Birch; Future; Expected date: 04/28/2022  -     RAST Allergen [place]; Future; Expected date: 04/28/2022  -     RAST Allergen, Sheep Cloverleaf [place]ony([place]); Future; Expected date: 04/28/2022  -     Allergen-Alternaria Alternata; Future; Expected date: 04/28/2022  -     Allergen-Maple [place]; Future; Expected date: 04/28/2022  -     Allergen, White Venkatesh; Future; Expected date: 04/28/2022    Suspect itching is due to ESRD on dialysis and diabetes.   Agree with intense hydration/moisurizing the skin.   Starting Singulair. Advised of black box warning associated with Singulair.    RTC 6 weeks      MADHAVI PARKS spent a total of 30 minutes on the day of the visit.  This includes face to face time and non-face to face time preparing to see the patient (eg, review of tests), obtaining and/or reviewing separately obtained history, documenting clinical information in the electronic or other health record, independently interpreting results and communicating results to the patient/family/caregiver, or care coordinator.

## 2022-04-29 LAB
ALBUMIN SERPL BCP-MCNC: 3.6 G/DL (ref 3.5–5.2)
ALBUMIN SERPL ELPH-MCNC: 3.99 G/DL (ref 3.35–5.55)
ALP SERPL-CCNC: 70 U/L (ref 55–135)
ALPHA1 GLOB SERPL ELPH-MCNC: 0.35 G/DL (ref 0.17–0.41)
ALPHA2 GLOB SERPL ELPH-MCNC: 0.69 G/DL (ref 0.43–0.99)
ALT SERPL W/O P-5'-P-CCNC: 20 U/L (ref 10–44)
ANA SER QL IF: NORMAL
ANION GAP SERPL CALC-SCNC: 14 MMOL/L (ref 8–16)
AST SERPL-CCNC: 20 U/L (ref 10–40)
B-GLOBULIN SERPL ELPH-MCNC: 0.59 G/DL (ref 0.5–1.1)
BILIRUB SERPL-MCNC: 0.3 MG/DL (ref 0.1–1)
BUN SERPL-MCNC: 54 MG/DL (ref 8–23)
CALCIUM SERPL-MCNC: 9.6 MG/DL (ref 8.7–10.5)
CHLORIDE SERPL-SCNC: 102 MMOL/L (ref 95–110)
CO2 SERPL-SCNC: 25 MMOL/L (ref 23–29)
CREAT SERPL-MCNC: 5.7 MG/DL (ref 0.5–1.4)
EST. GFR  (AFRICAN AMERICAN): 7.8 ML/MIN/1.73 M^2
EST. GFR  (NON AFRICAN AMERICAN): 6.7 ML/MIN/1.73 M^2
GAMMA GLOB SERPL ELPH-MCNC: 0.59 G/DL (ref 0.67–1.58)
GLUCOSE SERPL-MCNC: 142 MG/DL (ref 70–110)
IGE SERPL-ACNC: 75 IU/ML (ref 0–100)
PATHOLOGIST INTERPRETATION SPE: NORMAL
POTASSIUM SERPL-SCNC: 4.6 MMOL/L (ref 3.5–5.1)
PROT SERPL-MCNC: 6.2 G/DL (ref 6–8.4)
PROT SERPL-MCNC: 6.6 G/DL (ref 6–8.4)
RPR SER QL: NORMAL
SODIUM SERPL-SCNC: 141 MMOL/L (ref 136–145)

## 2022-05-02 LAB
A ALTERNATA IGE QN: <0.1 KU/L
A FUMIGATUS IGE QN: <0.1 KU/L
ALLERGEN BOXELDER MAPLE TREE IGE: <0.1 KU/L
ALLERGEN CHAETOMIUM GLOBOSUM IGE: <0.1 KU/L
ALLERGEN MAPLE (BOX ELDER) CLASS: NORMAL
ALLERGEN MULBERRY CLASS: NORMAL
ALLERGEN MULBERRY TREE IGE: <0.1 KU/L
ALLERGEN WHITE ASH TREE IGE: <0.1 KU/L
ALLERGEN WHITE PINE TREE IGE: <0.1 KU/L
AMER SYCAMORE IGE QN: <0.35 KU/L
BAHIA GRASS IGE QN: <0.1 KU/L
BALD CYPRESS IGE QN: <0.1 KU/L
C HERBARUM IGE QN: <0.1 KU/L
C LUNATA IGE QN: <0.1 KU/L
CAT DANDER IGE QN: <0.1 KU/L
CHAETOMIUM GLOB. CLASS: NORMAL
COCKLEBUR IGE QN: <0.1 KU/L
COCKSFOOT IGE QN: <0.1 KU/L
COMMON RAGWEED IGE QN: <0.1 KU/L
COTTONWOOD IGE QN: <0.1 KU/L
D FARINAE IGE QN: <0.1 KU/L
D PTERONYSS IGE QN: <0.1 KU/L
DEPRECATED A ALTERNATA IGE RAST QL: NORMAL
DEPRECATED A FUMIGATUS IGE RAST QL: NORMAL
DEPRECATED BAHIA GRASS IGE RAST QL: NORMAL
DEPRECATED BALD CYPRESS IGE RAST QL: NORMAL
DEPRECATED C HERBARUM IGE RAST QL: NORMAL
DEPRECATED C LUNATA IGE RAST QL: NORMAL
DEPRECATED CAT DANDER IGE RAST QL: NORMAL
DEPRECATED COCKLEBUR IGE RAST QL: NORMAL
DEPRECATED COCKSFOOT IGE RAST QL: NORMAL
DEPRECATED COMMON RAGWEED IGE RAST QL: NORMAL
DEPRECATED COTTONWOOD IGE RAST QL: NORMAL
DEPRECATED D FARINAE IGE RAST QL: NORMAL
DEPRECATED D PTERONYSS IGE RAST QL: NORMAL
DEPRECATED DOG DANDER IGE RAST QL: NORMAL
DEPRECATED ELDER IGE RAST QL: NORMAL
DEPRECATED ENGL PLANTAIN IGE RAST QL: NORMAL
DEPRECATED GUM-TREE IGE RAST QL: NORMAL
DEPRECATED HACKBERRY TREE IGE RAST QL: NORMAL
DEPRECATED JOHNSON GRASS IGE RAST QL: NORMAL
DEPRECATED KENT BLUE GRASS IGE RAST QL: NORMAL
DEPRECATED LONDON PLANE IGE RAST QL: NORMAL
DEPRECATED MUGWORT IGE RAST QL: NORMAL
DEPRECATED NETTLE IGE RAST QL: NORMAL
DEPRECATED PECAN/HICK TREE IGE RAST QL: NORMAL
DEPRECATED PER RYE GRASS IGE RAST QL: NORMAL
DEPRECATED PRIVET IGE RAST QL: NORMAL
DEPRECATED RED TOP GRASS IGE RAST QL: NORMAL
DEPRECATED ROACH IGE RAST QL: NORMAL
DEPRECATED SALTWORT IGE RAST QL: NORMAL
DEPRECATED SHEEP SORREL IGE RAST QL: NORMAL
DEPRECATED SILVER BIRCH IGE RAST QL: NORMAL
DEPRECATED TIMOTHY IGE RAST QL: NORMAL
DEPRECATED WHITE OAK IGE RAST QL: NORMAL
DEPRECATED WILLOW IGE RAST QL: NORMAL
DOG DANDER IGE QN: <0.1 KU/L
ELDER IGE QN: <0.1 KU/L
ELM CEDAR CLASS: NORMAL
ELM CEDAR, IGE: <0.1 KU/L
ENGL PLANTAIN IGE QN: <0.1 KU/L
GUM-TREE IGE QN: <0.1 KU/L
HACKBERRY TREE IGE QN: <0.1 KU/L
HBV CORE IGM SERPL QL IA: NEGATIVE
HCV AB SERPL QL IA: NEGATIVE
JOHNSON GRASS IGE QN: <0.1 KU/L
KENT BLUE GRASS IGE QN: <0.1 KU/L
LONDON PLANE IGE QN: <0.1 KU/L
MUGWORT IGE QN: <0.1 KU/L
NETTLE IGE QN: <0.1 KU/L
PECAN/HICK TREE IGE QN: <0.1 KU/L
PER RYE GRASS IGE QN: <0.1 KU/L
PRIVET IGE QN: <0.1 KU/L
RED TOP GRASS IGE QN: <0.1 KU/L
ROACH IGE QN: <0.1 KU/L
SALTWORT IGE QN: <0.1 KU/L
SHEEP SORREL IGE QN: <0.1 KU/L
SILVER BIRCH IGE QN: <0.1 KU/L
STEMPHYLIUM HERBARUM CLASS: NORMAL
STEMPHYLLIUM, IGE: <0.1 KU/L
STRONGYLOIDES ANTIBODY IGG: NEGATIVE
TIMOTHY IGE QN: <0.1 KU/L
TRYPTASE LEVEL: 12 NG/ML
WHITE ASH CLASS: NORMAL
WHITE OAK IGE QN: <0.1 KU/L
WHITE PINE CLASS: NORMAL
WILLOW IGE QN: <0.1 KU/L

## 2022-05-03 ENCOUNTER — OUTPATIENT CASE MANAGEMENT (OUTPATIENT)
Dept: ADMINISTRATIVE | Facility: OTHER | Age: 77
End: 2022-05-03
Payer: MEDICARE

## 2022-05-03 LAB
HIV 1+2 AB+HIV1 P24 AG SERPL QL IA: NEGATIVE
T CANIS IGG SER QL IA: NEGATIVE

## 2022-05-03 NOTE — LETTER
May 3, 2022    Avril GRANT Monzon  [address]  [place] [address]             [facility] [address] [place] [address] Dear Mrs Mackenzie,      I work with [facility] Outpatient Case Management Department. I have been unsuccessful at reaching you recently since we spoke on 4/12/22. If you require any future assistance or if any new concerns or problems arise, please call me.     The Outpatient Case Management Department can be reached at [phone] from 8 AM to 4:30 PM Monday thru Friday.    [facility] also has a program called [facility]) with a nurse available 24/7 to answer questions or provide medical advice for any non-emergent symptoms you may have. That number is [phone].     Kindest Regards,        Debbie Lopez RN  Outpatient Case Management  [phone]

## 2022-05-03 NOTE — PROGRESS NOTES
Outpatient Care Management  Plan of Care Follow Up Visit    Patient: Avril Monzon  MRN: [id number]  Date of Service: 05/03/2022  Completed by: Debbie Lopez RN  Referral Date: 12/20/2021  Program: Case Management (High Risk)    Reason for Visit   Patient presents with    Update Plan Of Care       Brief Summary: Phone contact with Mrs Mackenzie and her daughter, Elizabeth, and discussed diab diet, insulin doses and frequencies and upcoming Diab NP appt tomorrow afternoon. They agreed to follow up contact from this  in about 2 weeks.     Patient Summary     Involvement of Care:  Do I have permission to speak with other family members about your care?       Patient Reported Labs & Vitals:  1.  Any Patient Reported Labs & Vitals?     2.  Patient Reported Blood Pressure:     3.  Patient Reported Pulse:     4.  Patient Reported Weight (Kg):     5.  Patient Reported Blood Glucose (mg/dl):       Medical and social history was reviewed with patient and/or caregiver.     Clinical Assessment     Reviewed and provided basic information on available community resources for mental health, transportation, wellness resources, and palliative care programs with patient and/or caregiver.     Complex Care Plan     Care plan was discussed and completed today with input from patient and/or caregiver.    Patient Instructions     Instructions were provided via the [facility] patient resources and are available for the patient to view on the patient portal.    Next Steps: Continue to educate about DM and necessary measures to manage it. Debbie Lopez RN    Todays OPCM Self-Management Care Plan was developed with the patients/caregivers input and was based on identified barriers from todays assessment.  Goals were written today with the patient/caregiver and the patient has agreed to work towards these goals to improve his/her overall well-being. Patient verbalized understanding of the care plan, goals, and all of today's instructions.  Encouraged patient/caregiver to communicate with his/her physician and health care team about health conditions and the treatment plan.  Provided my contact information today and encouraged patient/caregiver to call me with any questions as needed.

## 2022-05-04 ENCOUNTER — OFFICE VISIT (OUTPATIENT)
Dept: DIABETES | Facility: CLINIC | Age: 77
End: 2022-05-04
Payer: MEDICARE

## 2022-05-04 DIAGNOSIS — Z53.21 PATIENT LEFT WITHOUT BEING SEEN: Primary | ICD-10-CM

## 2022-05-04 PROCEDURE — 3046F PR MOST RECENT HEMOGLOBIN A1C LEVEL > 9.0%: ICD-10-PCS | Mod: CPTII,S$GLB,, | Performed by: NURSE PRACTITIONER

## 2022-05-04 PROCEDURE — 1159F PR MEDICATION LIST DOCUMENTED IN MEDICAL RECORD: ICD-10-PCS | Mod: CPTII,S$GLB,, | Performed by: NURSE PRACTITIONER

## 2022-05-04 PROCEDURE — 1101F PR PT FALLS ASSESS DOC 0-1 FALLS W/OUT INJ PAST YR: ICD-10-PCS | Mod: CPTII,S$GLB,, | Performed by: NURSE PRACTITIONER

## 2022-05-04 PROCEDURE — 99499 NO LOS: ICD-10-PCS | Mod: S$GLB,,, | Performed by: NURSE PRACTITIONER

## 2022-05-04 PROCEDURE — 99999 PR PBB SHADOW E&M-EST. PATIENT-LVL III: ICD-10-PCS | Mod: PBBFAC,,, | Performed by: NURSE PRACTITIONER

## 2022-05-04 PROCEDURE — 99499 UNLISTED E&M SERVICE: CPT | Mod: S$GLB,,, | Performed by: NURSE PRACTITIONER

## 2022-05-04 PROCEDURE — 3288F PR FALLS RISK ASSESSMENT DOCUMENTED: ICD-10-PCS | Mod: CPTII,S$GLB,, | Performed by: NURSE PRACTITIONER

## 2022-05-04 PROCEDURE — 3288F FALL RISK ASSESSMENT DOCD: CPT | Mod: CPTII,S$GLB,, | Performed by: NURSE PRACTITIONER

## 2022-05-04 PROCEDURE — 1101F PT FALLS ASSESS-DOCD LE1/YR: CPT | Mod: CPTII,S$GLB,, | Performed by: NURSE PRACTITIONER

## 2022-05-04 PROCEDURE — 1160F PR REVIEW ALL MEDS BY PRESCRIBER/CLIN PHARMACIST DOCUMENTED: ICD-10-PCS | Mod: CPTII,S$GLB,, | Performed by: NURSE PRACTITIONER

## 2022-05-04 PROCEDURE — 1159F MED LIST DOCD IN RCRD: CPT | Mod: CPTII,S$GLB,, | Performed by: NURSE PRACTITIONER

## 2022-05-04 PROCEDURE — 1160F RVW MEDS BY RX/DR IN RCRD: CPT | Mod: CPTII,S$GLB,, | Performed by: NURSE PRACTITIONER

## 2022-05-04 PROCEDURE — 99999 PR PBB SHADOW E&M-EST. PATIENT-LVL III: CPT | Mod: PBBFAC,,, | Performed by: NURSE PRACTITIONER

## 2022-05-04 PROCEDURE — 3046F HEMOGLOBIN A1C LEVEL >9.0%: CPT | Mod: CPTII,S$GLB,, | Performed by: NURSE PRACTITIONER

## 2022-05-04 NOTE — PROGRESS NOTES
Patient in clinic for follow up visit. No BG log for review. Brought personal CGM Piper. BEAR Doss placed Piper sensor on patient's left arm. Pt taught on how to use Piper  to scan readings. Will reschedule appt to 2 weeks. No LOS.

## 2022-05-05 ENCOUNTER — HOSPITAL ENCOUNTER (OUTPATIENT)
Dept: PREADMISSION TESTING | Facility: HOSPITAL | Age: 77
Discharge: HOME | End: 2022-05-05
Attending: INTERNAL MEDICINE
Payer: MEDICARE

## 2022-05-05 DIAGNOSIS — Z12.11 COLON CANCER SCREENING: Primary | ICD-10-CM

## 2022-05-05 RX ORDER — POLYETHYLENE GLYCOL 3350, SODIUM SULFATE ANHYDROUS, SODIUM BICARBONATE, SODIUM CHLORIDE, POTASSIUM CHLORIDE 236; 22.74; 6.74; 5.86; 2.97 G/4L; G/4L; G/4L; G/4L; G/4L
4 POWDER, FOR SOLUTION ORAL ONCE
Qty: 4000 ML | Refills: 0 | Status: SHIPPED | OUTPATIENT
Start: 2022-05-05 | End: 2022-05-05

## 2022-05-11 NOTE — TELEPHONE ENCOUNTER
----- Message from Melissa Don sent at 5/25/2017  3:02 PM CDT -----  Contact: pt  Pt request call back concerning a prescription sent a month ago, pt can be reached at [phone]///[id number]   Paramedian Forehead Flap Text: A decision was made to reconstruct the defect utilizing an interpolation axial flap and a staged reconstruction.  A telfa template was made of the defect.  This telfa template was then used to outline the paramedian forehead pedicle flap.  The donor area for the pedicle flap was then injected with anesthesia.  The flap was excised through the skin and subcutaneous tissue down to the layer of the underlying musculature.  The pedicle flap was carefully excised within this deep plane to maintain its blood supply.  The edges of the donor site were undermined.   The donor site was closed in a primary fashion.  The pedicle was then rotated into position and sutured.  Once the tube was sutured into place, adequate blood supply was confirmed with blanching and refill.  The pedicle was then wrapped with xeroform gauze and dressed appropriately with a telfa and gauze bandage to ensure continued blood supply and protect the attached pedicle.

## 2022-05-12 LAB
CU INDEX: 7.8
CU, ANTI-THYROGLOBULIN IGG: <20 IU/ML
CU, ANTI-THYROID PEROXIDASE IGG: <10 IU/ML
CU, TSH (THYROTROPIN): 1.25 UIU/ML (ref 0.4–4)

## 2022-05-17 ENCOUNTER — PATIENT OUTREACH (OUTPATIENT)
Dept: ADMINISTRATIVE | Facility: OTHER | Age: 77
End: 2022-05-17
Payer: MEDICARE

## 2022-05-17 ENCOUNTER — OUTPATIENT CASE MANAGEMENT (OUTPATIENT)
Dept: ADMINISTRATIVE | Facility: OTHER | Age: 77
End: 2022-05-17
Payer: MEDICARE

## 2022-05-17 NOTE — PROGRESS NOTES
Outpatient Care Management  Plan of Care Follow Up Visit    Patient: Avril Monzon  MRN: [id number]  Date of Service: 05/17/2022  Completed by: Debbie Lopez RN  Referral Date: 12/20/2021  Program: Case Management (High Risk)    Reason for Visit   Patient presents with    Update Plan Of Care       Brief Summary: Phone contact today with Mrs Mackenzie. She is resistant to talk to this CM initially, reporting her daughter, Elizabeth, who manages all her care is not currently home. Reviewed recent Diab NP appt and commended her for getting Piper CGM placed and encouraged her to attend tomorrow's scheduled appt with Diab NP so recent blood sugar readings can be reviewed and she agreed to try to attend appt and have follow up contact from this CM next week with Elizabeth.     Patient Summary     Involvement of Care:  Do I have permission to speak with other family members about your care?       Patient Reported Labs & Vitals:  1.  Any Patient Reported Labs & Vitals?     2.  Patient Reported Blood Pressure:     3.  Patient Reported Pulse:     4.  Patient Reported Weight (Kg):     5.  Patient Reported Blood Glucose (mg/dl):       Medical and social history was reviewed with patient and/or caregiver.     Clinical Assessment     Reviewed and provided basic information on available community resources for mental health, transportation, wellness resources, and palliative care programs with patient and/or caregiver.     Complex Care Plan     Care plan was discussed and completed today with input from patient and/or caregiver.    Patient Instructions     Instructions were provided via the [facility] patient resources and are available for the patient to view on the patient portal.    Next Steps: Discuss CHF and DM disease processes and ongoing measures to help manage both with Elizabeth. Debbie Lopez RN    No follow-ups on file.    Todays OPCM Self-Management Care Plan was developed with the patients/caregivers input and was based on  identified barriers from todays assessment.  Goals were written today with the patient/caregiver and the patient has agreed to work towards these goals to improve his/her overall well-being. Patient verbalized understanding of the care plan, goals, and all of today's instructions. Encouraged patient/caregiver to communicate with his/her physician and health care team about health conditions and the treatment plan.  Provided my contact information today and encouraged patient/caregiver to call me with any questions as needed.

## 2022-05-17 NOTE — PROGRESS NOTES
Health Maintenance Due   Topic Date Due    Shingles Vaccine (1 of 2) Never done    DEXA Scan  Never done    Colorectal Cancer Screening  Never done    COVID-19 Vaccine (4 - Booster for Moderna series) 04/30/2022     Updates were requested from care everywhere.  Chart was reviewed for overdue [facility]active [facility]ters ([facility]) topics (CRS, Breast Cancer Screening, Eye exam)  Health Maintenance has been updated.  LINKS immunization registry triggered.  Immunizations were reconciled.

## 2022-05-19 ENCOUNTER — PES CALL (OUTPATIENT)
Dept: ADMINISTRATIVE | Facility: CLINIC | Age: 77
End: 2022-05-19
Payer: MEDICARE

## 2022-05-24 ENCOUNTER — PES CALL (OUTPATIENT)
Dept: ADMINISTRATIVE | Facility: CLINIC | Age: 77
End: 2022-05-24
Payer: MEDICARE

## 2022-05-24 ENCOUNTER — OUTPATIENT CASE MANAGEMENT (OUTPATIENT)
Dept: ADMINISTRATIVE | Facility: OTHER | Age: 77
End: 2022-05-24
Payer: MEDICARE

## 2022-05-24 NOTE — PROGRESS NOTES
5/24/22 -  Mrs Mackenzie is scheduled for a follow up contact from this CM today, but noted on chart review she has a colonoscopy today. Will plan to follow up next week on a non-dialysis day. Debbie Lopez RN

## 2022-05-31 ENCOUNTER — OUTPATIENT CASE MANAGEMENT (OUTPATIENT)
Dept: ADMINISTRATIVE | Facility: OTHER | Age: 77
End: 2022-05-31
Payer: MEDICARE

## 2022-05-31 NOTE — PROGRESS NOTES
Outpatient Care Management  Plan of Care Follow Up Visit    Patient: Avril Monzon  MRN: [id number]  Date of Service: 05/31/2022  Completed by: Debbie Lopez RN  Referral Date: 12/20/2021  Program: Case Management (High Risk)    Reason for Visit   Patient presents with    Update Plan Of Care       Brief Summary: Phone contact with Mrs Mackenzie today and reviewed several diabetes care plan tasks. She is aware of Diab NP appt on 6/8/22 with plans to attend and agrees to follow up contact from this  the week after her appt on a non-dialysis day.     Patient Summary     Involvement of Care:  Do I have permission to speak with other family members about your care?       Patient Reported Labs & Vitals:  1.  Any Patient Reported Labs & Vitals?     2.  Patient Reported Blood Pressure:     3.  Patient Reported Pulse:     4.  Patient Reported Weight (Kg):     5.  Patient Reported Blood Glucose (mg/dl):       Medical and social history was reviewed with patient and/or caregiver.     Clinical Assessment     Reviewed and provided basic information on available community resources for mental health, transportation, wellness resources, and palliative care programs with patient and/or caregiver.     Complex Care Plan     Care plan was discussed and completed today with input from patient and/or caregiver.    Patient Instructions     Instructions were provided via the [employer] patient resources and are available for the patient to view on the patient portal.    Next Steps: Continue to educate about diabetes and measures to control it. Debbie Lopez RN    No follow-ups on file.    Todays OPCM Self-Management Care Plan was developed with the patients/caregivers input and was based on identified barriers from todays assessment.  Goals were written today with the patient/caregiver and the patient has agreed to work towards these goals to improve his/her overall well-being. Patient verbalized understanding of the care plan, goals,  and all of today's instructions. Encouraged patient/caregiver to communicate with his/her physician and health care team about health conditions and the treatment plan.  Provided my contact information today and encouraged patient/caregiver to call me with any questions as needed.

## 2022-05-31 NOTE — TELEPHONE ENCOUNTER
----- Message from Abby Santiago sent at 10/10/2017 11:19 AM CDT -----  Please call pt back at [phone] in regards to getting her insulin refills.   Terbinafine Counseling: Patient counseling regarding adverse effects of terbinafine including but not limited to headache, diarrhea, rash, upset stomach, liver function test abnormalities, itching, taste/smell disturbance, nausea, abdominal pain, and flatulence.  There is a rare possibility of liver failure that can occur when taking terbinafine.  The patient understands that a baseline LFT and kidney function test may be required. The patient verbalized understanding of the proper use and possible adverse effects of terbinafine.  All of the patient's questions and concerns were addressed.

## 2022-06-13 DIAGNOSIS — E11.59 HYPERTENSION ASSOCIATED WITH DIABETES: Primary | ICD-10-CM

## 2022-06-13 DIAGNOSIS — N18.6 ESRD (END STAGE RENAL DISEASE): ICD-10-CM

## 2022-06-13 DIAGNOSIS — I15.2 HYPERTENSION ASSOCIATED WITH DIABETES: Primary | ICD-10-CM

## 2022-06-13 RX ORDER — TELMISARTAN 20 MG/1
20 TABLET ORAL DAILY
Qty: 30 TABLET | Refills: 4 | Status: SHIPPED | OUTPATIENT
Start: 2022-06-13 | End: 2022-07-05 | Stop reason: SDUPTHER

## 2022-06-14 ENCOUNTER — OUTPATIENT CASE MANAGEMENT (OUTPATIENT)
Dept: ADMINISTRATIVE | Facility: OTHER | Age: 77
End: 2022-06-14
Payer: MEDICARE

## 2022-06-14 NOTE — PROGRESS NOTES
Outpatient Care Management  Plan of Care Follow Up Visit    Patient: Avril Monzon  MRN: [id number]  Date of Service: 06/14/2022  Completed by: Debbie Lopez RN  Referral Date: 12/20/2021  Program: Case Management (High Risk)    No chief complaint on file.      Brief Summary: Phone contact with Mrs Mackenzie and daughter, Elizabeth, and taught both about the importance of med compliance and checking blood sugars several times a day. She agreed to follow up contact from this  next week to review recent blood sugars.     Patient Summary     Involvement of Care:  Do I have permission to speak with other family members about your care?       Patient Reported Labs & Vitals:  1.  Any Patient Reported Labs & Vitals?     2.  Patient Reported Blood Pressure:     3.  Patient Reported Pulse:     4.  Patient Reported Weight (Kg):     5.  Patient Reported Blood Glucose (mg/dl):       Medical and social history was reviewed with patient and/or caregiver.     Clinical Assessment     Reviewed and provided basic information on available community resources for mental health, transportation, wellness resources, and palliative care programs with patient and/or caregiver.     Complex Care Plan     Care plan was discussed and completed today with input from patient and/or caregiver.    Patient Instructions     Instructions were provided via the [employer] patient resources and are available for the patient to view on the patient portal.    Next Steps: Continue to educate about need for diabetes control. Debbie Lopez RN    No follow-ups on file.    Todays [facility] Self-Management Care Plan was developed with the patients/caregivers input and was based on identified barriers from todays assessment.  Goals were written today with the patient/caregiver and the patient has agreed to work towards these goals to improve his/her overall well-being. Patient verbalized understanding of the care plan, goals, and all of today's instructions. Encouraged  patient/caregiver to communicate with his/her physician and health care team about health conditions and the treatment plan.  Provided my contact information today and encouraged patient/caregiver to call me with any questions as needed.

## 2022-06-21 ENCOUNTER — DOCUMENTATION ONLY (OUTPATIENT)
Dept: NEPHROLOGY | Facility: CLINIC | Age: 77
End: 2022-06-21
Payer: MEDICARE

## 2022-06-22 NOTE — H&P
History & Physical      Chief Complaint:  H&P    HPI:      77 y.o AAF with ESRD on HD MWF @ [facility].   The patient's first date   of hemodialysis was 10/26/2020 . Her cause of end-stage renal disease is T2DM.  Her current dialysis   prescription is as follows:  Treatment Time: 225 Minutes; Frequency: 3 Times per week; Dialyzer: Gambro Revaclr 300 1209; Access Used: AV  Fistula Arm (Left upper) +B/T ; Max UFR (L/hr): 0.0 L/hr; Max UFR (ml/kg/hr): 13.0 ml/kg/hr; Target Wt: 60.0 Kg; Blood Flow  Rate: 350 mL/Min; Dialysate Flow Rate: 600 mL/Min; Dialysate: Custom; K: 2.0 mEq/L; Ca: 2.5 mEq/L; Bicarb: 35.0  mEq/L; Sodium: 138.0 mEq/L; Heparin Loading Dose: 1600; Heparin Concentration: 1:1,000; Heparin Infusion: 600         ROS:        Constitutional: Negative for fever, chills, weight loss, malaise/fatigue and diaphoresis.   HENT: Negative for hearing loss, ear pain, nosebleeds, congestion, sore throat, neck pain, tinnitus and ear discharge.    Eyes: Negative for blurred vision, double vision, photophobia, pain, discharge and redness.   Respiratory: Negative for cough, hemoptysis, sputum production, shortness of breath, wheezing and stridor.    Cardiovascular: Negative for chest pain, palpitations, orthopnea, claudication, leg swelling and PND.   Gastrointestinal: Negative for heartburn, nausea, vomiting, abdominal pain, diarrhea, constipation, blood in stool and melena.   Genitourinary: Negative for dysuria, urgency, frequency, hematuria and flank pain.   Musculoskeletal: Negative for myalgias, back pain, joint pain and falls.   Skin: Negative for itching and rash.   Neurological: Negative for dizziness, tingling, tremors, sensory change, speech change, focal weakness, seizures, loss of consciousness, weakness and headaches.   Endo/Heme/Allergies: Negative for environmental allergies and polydipsia. Does not bruise/bleed easily.   Psychiatric/Behavioral: Negative for depression, suicidal ideas,  hallucinations, memory loss and substance abuse. The patient is not nervous/anxious and does not have insomnia.    All 14 systems reviewed and negative except as noted above.            Past Medical History:   Diagnosis Date    Acute hypoxemic respiratory failure 11/26/2018    Anemia     Arthritis     back, hand, knees    Back pain     Cataract     CKD stage G4/A3, GFR 15 - 29 and albumin creatinine ratio >300 mg/g     GFR 40% Jan 2014 and 33% ub 3/2014 ([facility])    Coronary artery disease involving native coronary artery of native heart 11/30/2018    Diabetic retinopathy     DM (diabetes mellitus) type II controlled, neurological manifestation     Exudative age-related macular degeneration of left eye with active choroidal neovascularization 2/5/2019    GERD (gastroesophageal reflux disease)     Glaucoma     Heart failure     Hyperlipidemia     Hypertension     Non-ST elevation MI (NSTEMI) 11/28/2018    NSTEMI (non-ST elevated myocardial infarction) 11/27/2018    Peripheral neuropathy     Polyneuropathy     Proteinuria     >6 mo     Seizures     [facility] 1/2014 -dick CT NRI showed small vessel    Stroke 2012,2014    Tobacco dependence     Type 2 diabetes with peripheral circulatory disorder, controlled        Past Surgical History:   Procedure Laterality Date    BREAST LUMPECTOMY Left     benign, when patient was 15 years old    BREAST MASS EXCISION      ,benign, age 15.    CATHETERIZATION OF BOTH LEFT AND RIGHT HEART N/A 11/28/2018    Procedure: CATHETERIZATION, HEART, BOTH LEFT AND RIGHT;  Surgeon: Michelle Holman MD;  Location: [facility] CATH LAB;  Service: Cardiology;  Laterality: N/A;    CATHETERIZATION OF BOTH LEFT AND RIGHT HEART N/A 11/30/2018    Procedure: CATHETERIZATION, HEART, BOTH LEFT AND RIGHT;  Surgeon: Michelle Holman MD;  Location: [facility] CATH LAB;  Service: Cardiology;  Laterality: N/A;    HYSTERECTOMY  1982    INSERTION OF SHUNT      LEFT HEART CATHETERIZATION Left 12/3/2018     Procedure: CATHETERIZATION, HEART, LEFT;  Surgeon: Michelle Holman MD;  Location: [facility] CATH LAB;  Service: Cardiology;  Laterality: Left;  LAD ATHERECTOMY STENT/ FEMORAL APPROACH    OOPHORECTOMY      wrist cyst Right 1980s    dorsal wrist cyst       Family History   Problem Relation Age of Onset    Diabetes Mother     Hyperlipidemia Mother     Hypertension Mother     Heart disease Mother         MI    Muscular dystrophy Son     Cancer Maternal Grandmother         colon       Social History     Socioeconomic History    Marital status:    Tobacco Use    Smoking status: Former Smoker     Packs/day: 1.50     Years: 30.00     Pack years: 45.00     Types: Cigarettes     Quit date: 1990     Years since quittin.4    Smokeless tobacco: Former User   Substance and Sexual Activity    Alcohol use: No     Alcohol/week: 0.0 standard drinks    Drug use: No    Sexual activity: Not Currently       Current Outpatient Medications   Medication Sig Dispense Refill    ACCU-CHEK ARDEN PLUS METER Mis USE TO TEST TWICE DAILY  0    aspirin (ECOTRIN) 81 MG EC tablet Take 1 tablet (81 mg total) by mouth once daily.  0    blood sugar diagnostic Strp 1 strip by Misc.(Non-Drug; Combo Route) route 3 (three) times daily. relion ultima 100 strip 11    blood sugar diagnostic Strp To check BG 2 times daily, to use with insurance preferred meter 100 each 6    blood-glucose meter kit To check BG 2 times daily, to use with insurance preferred meter 1 each 0    clopidogreL (PLAVIX) 75 mg tablet Take 1 tablet (75 mg total) by mouth once daily. 60 tablet 0    cyanocobalamin (VITAMIN B-12) 1000 MCG tablet Take 100 mcg by mouth once daily.      erythromycin (ROMYCIN) ophthalmic ointment PLACE A SMALL AMOUNT INTO LEFT EYE THREE TIMES DAILY AS DIRECTED      folic acid (FOLVITE) 1 MG tablet Take 1 mg by mouth once daily.      hydrOXYzine HCL (ATARAX) 25 MG tablet Take 1 tablet (25 mg total) by mouth 3 (three) times daily  as needed for Itching. (Patient not taking: Reported on 5/5/2022) 90 tablet 2    insulin NPH/Reg human (HUMULIN, 70/30,) 100 unit/mL (70-30) InPn pen To do 28 units subq in AM and 8 units subq in PM.  Dose to be done within 15 min before or after meal. (Patient taking differently: To do 28 units subq in AM and 8 units subq in PM.  Dose to be done within 15 min before or after meal.) 3 each 1    isosorbide-hydrALAZINE 20-37.5 mg (BIDIL) 20-37.5 mg Tab Take 1 tablet by mouth 3 (three) times daily. 90 tablet 3    lancets (ACCU-CHEK SOFTCLIX LANCETS) Misc 1 lancet by Misc.(Non-Drug; Combo Route) route 3 (three) times daily. 100 each 11    lancets Misc To check BG 2 times daily, to use with insurance preferred meter 100 each 6    LEXISCAN 0.4 mg/5 mL Syrg       metoprolol tartrate (LOPRESSOR) 50 MG tablet Take 1 tablet (50 mg total) by mouth 2 (two) times daily. 60 tablet 6    sodium,potassium,mag sulfates (SUPREP BOWEL PREP KIT) 17.5-3.13-1.6 gram SolR Take as directed 354 mL 0    telmisartan (MICARDIS) 20 MG Tab Take 1 tablet (20 mg total) by mouth once daily. 30 tablet 4    triamcinolone acetonide 0.1% (KENALOG) 0.1 % cream Apply topically 2 (two) times daily. 80 g 2     No current facility-administered medications for this visit.       Review of patient's allergies indicates:   Allergen Reactions    Codeine Swelling    Darvon [propoxyphene] Swelling    Atorvastatin      Muscle twitching         There were no vitals filed for this visit.          Physical Exam   Nursing Notes and Vital Signs Reviewed.     Constitutional: Well developed, well nourished. AAOx3, NAD, speech/ comprehension clear   Head: Atraumatic. Normocephalic.   Eyes: PERRL. EOMI. Conjunctivae are not pale. No scleral icterus.   ENT: Mucous membranes are dry. No tongue tremors. Throat clear.  Neck: Supple. No JVD or LN or Carotid Bruits noted B.  Cardiovascular: S1S2 RRR, no murmurs, rubs, or gallops. Distal pulses are 2+ and symmetric.    Pulmonary/Chest: No evidence of respiratory distress. Clear to auscultation bilaterally. No wheezing, rales or rhonchi. No chest wall TTP.   Abdominal: Soft and non-distended. There is no tenderness. No rebound, guarding, or rigidity. No organomegaly. No mass or viscera palpable  Musculoskeletal: FROM in all extremities. No deformities, no TTP, no edema. No midline spinal TTP. No step-offs. Pelvis is stable to compression. No cyanosis. Moves all four extremities.   Skin: Skin is warm and dry.   Neurological: No gross neurological deficits, Strength 5/5 B, is equal in the upper and lower extremities bilaterally. No sensory deficits to light touch. No pronator drift.  DTRs are 2+ and equal throughout.   Psychiatric: Good eye contact. Normal Affect.      Laboratory Data:  Reviewed and noted in plan where applicable- Please see chart for full laboratory data.       Lab Results   Component Value Date    WBC 8.22 12/20/2021    HGB 8.0 (L) 12/20/2021    HCT 24.9 (L) 12/20/2021     (H) 12/20/2021     12/20/2021     BMP  Lab Results   Component Value Date     04/28/2022    K 4.6 04/28/2022     04/28/2022    CO2 25 04/28/2022    BUN 54 (H) 04/28/2022    CREATININE 5.7 (H) 04/28/2022    CALCIUM 9.6 04/28/2022    ANIONGAP 14 04/28/2022    ESTGFRAFRICA 7.8 (A) 04/28/2022    EGFRNONAA 6.7 (A) 04/28/2022     CMP  Sodium   Date Value Ref Range Status   04/28/2022 141 136 - 145 mmol/L Final     Potassium   Date Value Ref Range Status   04/28/2022 4.6 3.5 - 5.1 mmol/L Final     Chloride   Date Value Ref Range Status   04/28/2022 102 95 - 110 mmol/L Final     CO2   Date Value Ref Range Status   04/28/2022 25 23 - 29 mmol/L Final     Glucose   Date Value Ref Range Status   04/28/2022 142 (H) 70 - 110 mg/dL Final     BUN   Date Value Ref Range Status   04/28/2022 54 (H) 8 - 23 mg/dL Final     Creatinine   Date Value Ref Range Status   04/28/2022 5.7 (H) 0.5 - 1.4 mg/dL Final     Calcium   Date Value Ref  Range Status   04/28/2022 9.6 8.7 - 10.5 mg/dL Final     Total Protein   Date Value Ref Range Status   04/28/2022 6.6 6.0 - 8.4 g/dL Final     Albumin   Date Value Ref Range Status   04/28/2022 3.6 3.5 - 5.2 g/dL Final     Total Bilirubin   Date Value Ref Range Status   04/28/2022 0.3 0.1 - 1.0 mg/dL Final     Comment:     For infants and newborns, interpretation of results should be based  on gestational age, weight and in agreement with clinical  observations.    Premature Infant recommended reference ranges:  Up to 24 hours.............<8.0 mg/dL  Up to 48 hours............<12.0 mg/dL  3-5 days..................<15.0 mg/dL  6-29 days.................<15.0 mg/dL       Alkaline Phosphatase   Date Value Ref Range Status   04/28/2022 70 55 - 135 U/L Final     AST   Date Value Ref Range Status   04/28/2022 20 10 - 40 U/L Final     ALT   Date Value Ref Range Status   04/28/2022 20 10 - 44 U/L Final     Anion Gap   Date Value Ref Range Status   04/28/2022 14 8 - 16 mmol/L Final     eGFR if    Date Value Ref Range Status   04/28/2022 7.8 (A) >60 mL/min/1.73 m^2 Final     eGFR if non    Date Value Ref Range Status   04/28/2022 6.7 (A) >60 mL/min/1.73 m^2 Final     Comment:     Calculation used to obtain the estimated glomerular filtration  rate (eGFR) is the CKD-EPI equation.        Lab Results   Component Value Date    CALCIUM 9.6 04/28/2022    PHOS 3.6 07/27/2021     Lab Results   Component Value Date    K 4.6 04/28/2022     Lab Results   Component Value Date    LABPROT 10.0 06/15/2020    ALBUMIN 3.6 04/28/2022     Lab Results   Component Value Date    HGBA1C 10.8 (H) 03/08/2022       [facility]  @[id number](GLU,NA,K,Cl,CO2,BUN,Creatinine,Calcium,MG)@      Radiology:  Reviewed and noted in plan where applicable- Please see chart for full radiology data.            ASSESSMENT/PLAN:     Patient Active Problem List   Diagnosis    Constipation    Hypertension associated with diabetes     Hyperglycemia due to diabetes mellitus    ESRD (end stage renal disease)    Proteinuria    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, with long-term current use of insulin    Cataracta brunescens of right eye    Bilateral carotid artery disease    Aortic atherosclerosis    Type 2 diabetes mellitus with circulatory disorder, with long-term current use of insulin    History of CVA (cerebrovascular accident)    Hyperparathyroidism    Vitamin D deficiency    DDD (degenerative disc disease), lumbar    Anemia in chronic kidney disease, on chronic dialysis    Iron deficiency anemia due to chronic blood loss    Hyperlipidemia associated with type 2 diabetes mellitus    Pulmonary hypertension    Acute CHF    Coronary artery disease involving native coronary artery of native heart    Exudative age-related macular degeneration of left eye with active choroidal neovascularization    Other headache syndrome    Non-rheumatic mitral regurgitation    Non-rheumatic tricuspid valve insufficiency    Age-related nuclear cataract of left eye    DM type 2 with diabetic peripheral neuropathy    Pulmonary edema    Unspecified inflammatory spondylopathy, lumbar region    Convulsions    Hemiplegia    Other specified complication of vascular prosthetic devices, implants and grafts, initial encounter    Elevated troponin    Chronic diastolic heart failure    Patient left without being seen         PLAN:      Assessment and plan:    1.  ESRD: Doing very well on  dialysis. We will continue hemodialysis treatments three   times a week, four hours each treatment, maintaining a URR of 70% or greater and   a Kt/V of 1.20.  Currently, the patient is stable.    2.  Anemia: .  We will check hemoglobin at least monthly,   target range 10 to 11, transferrin saturation monthly, and ferritin quarterly.    We will dose Epogen and iron according to monthly blood work and according to    protocol.    3 . Hypertension.  The patient's blood pressure generally stabilizes during   Treatment. Currently controlled with current medications, sodium and fluid   restrictions and dialysis prescription. BP is much better controlled    4.  Hyperparathyroidism secondary to renal origin.  We will check intact PTH on   a quarterly basis.  We will dose vitamin D according to blood work and protocol.      JAREN Curtis-C

## 2022-06-23 ENCOUNTER — OUTPATIENT CASE MANAGEMENT (OUTPATIENT)
Dept: ADMINISTRATIVE | Facility: OTHER | Age: 77
End: 2022-06-23
Payer: MEDICARE

## 2022-06-23 NOTE — PROGRESS NOTES
Outpatient Care Management  Plan of Care Follow Up Visit    Patient: Avril Monzon  MRN: [id number]  Date of Service: 06/23/2022  Completed by: Debbie Lopez RN  Referral Date: 12/20/2021  Program: Case Management (High Risk)    No chief complaint on file.      Brief Summary: Phone contact with Mrs Monzon today and reviewed several care plan tasks related to diabetes and CHF and she agreed to follow up contact from this CM in two weeks.     Patient Summary     Involvement of Care:  Do I have permission to speak with other family members about your care?       Patient Reported Labs & Vitals:  1.  Any Patient Reported Labs & Vitals?     2.  Patient Reported Blood Pressure:     3.  Patient Reported Pulse:     4.  Patient Reported Weight (Kg):     5.  Patient Reported Blood Glucose (mg/dl):       Medical and social history was reviewed with patient and/or caregiver.     Clinical Assessment     Reviewed and provided basic information on available community resources for mental health, transportation, wellness resources, and palliative care programs with patient and/or caregiver.     Complex Care Plan     Care plan was discussed and completed today with input from patient and/or caregiver.    Patient Instructions     Instructions were provided via the [employer] patient resources and are available for the patient to view on the patient portal.    Next Steps: Continue to educate about diabetes and CHF disease processes and measures to manage them. Debbie Lopez RN.     No follow-ups on file.    [facility] Self-Management Care Plan was developed with the patients/caregivers input and was based on identified barriers from todays assessment.  Goals were written today with the patient/caregiver and the patient has agreed to work towards these goals to improve his/her overall well-being. Patient verbalized understanding of the care plan, goals, and all of today's instructions. Encouraged patient/caregiver to communicate  with his/her physician and health care team about health conditions and the treatment plan.  Provided my contact information today and encouraged patient/caregiver to call me with any questions as needed.

## 2022-06-24 ENCOUNTER — PATIENT OUTREACH (OUTPATIENT)
Dept: ADMINISTRATIVE | Facility: HOSPITAL | Age: 77
End: 2022-06-24
Payer: MEDICARE

## 2022-06-30 ENCOUNTER — TELEPHONE (OUTPATIENT)
Dept: CARDIOLOGY | Facility: CLINIC | Age: 77
End: 2022-06-30
Payer: MEDICARE

## 2022-06-30 ENCOUNTER — TELEPHONE (OUTPATIENT)
Dept: PREADMISSION TESTING | Facility: HOSPITAL | Age: 77
End: 2022-06-30
Payer: MEDICARE

## 2022-06-30 DIAGNOSIS — I50.9 ACUTE CONGESTIVE HEART FAILURE, UNSPECIFIED HEART FAILURE TYPE: Primary | ICD-10-CM

## 2022-06-30 DIAGNOSIS — I25.119 CORONARY ARTERY DISEASE INVOLVING NATIVE CORONARY ARTERY OF NATIVE HEART WITH ANGINA PECTORIS: ICD-10-CM

## 2022-06-30 RX ORDER — CLOPIDOGREL BISULFATE 75 MG/1
75 TABLET ORAL DAILY
Qty: 90 TABLET | Refills: 3 | Status: SHIPPED | OUTPATIENT
Start: 2022-06-30 | End: 2022-07-05 | Stop reason: SDUPTHER

## 2022-06-30 NOTE — TELEPHONE ENCOUNTER
Please advise if patient may hold Plavix 5 days prior to endoscopy procedure. Upon your approval, our team will inform patient of medication clearance prior to procedure.

## 2022-07-01 DIAGNOSIS — I15.2 HYPERTENSION ASSOCIATED WITH DIABETES: Primary | Chronic | ICD-10-CM

## 2022-07-01 DIAGNOSIS — N18.6 ESRD (END STAGE RENAL DISEASE): ICD-10-CM

## 2022-07-01 DIAGNOSIS — E11.59 HYPERTENSION ASSOCIATED WITH DIABETES: Primary | Chronic | ICD-10-CM

## 2022-07-01 DIAGNOSIS — R07.9 CHEST PAIN, UNSPECIFIED TYPE: Primary | ICD-10-CM

## 2022-07-01 DIAGNOSIS — Z76.0 MEDICATION REFILL: ICD-10-CM

## 2022-07-01 RX ORDER — ISOSORBIDE DINITRATE AND HYDRALAZINE HYDROCHLORIDE 37.5; 2 MG/1; MG/1
1 TABLET ORAL 3 TIMES DAILY
Qty: 90 TABLET | Refills: 1 | Status: SHIPPED | OUTPATIENT
Start: 2022-07-01 | End: 2022-07-05 | Stop reason: SDUPTHER

## 2022-07-05 ENCOUNTER — HOSPITAL ENCOUNTER (OUTPATIENT)
Dept: CARDIOLOGY | Facility: HOSPITAL | Age: 77
Discharge: HOME | End: 2022-07-05
Attending: INTERNAL MEDICINE
Payer: MEDICARE

## 2022-07-05 ENCOUNTER — PATIENT OUTREACH (OUTPATIENT)
Dept: ADMINISTRATIVE | Facility: HOSPITAL | Age: 77
End: 2022-07-05
Payer: MEDICARE

## 2022-07-05 ENCOUNTER — OFFICE VISIT (OUTPATIENT)
Dept: CARDIOLOGY | Facility: CLINIC | Age: 77
End: 2022-07-05
Payer: MEDICARE

## 2022-07-05 VITALS
OXYGEN SATURATION: 96 % | SYSTOLIC BLOOD PRESSURE: 140 MMHG | WEIGHT: 130 LBS | BODY MASS INDEX: 21.66 KG/M2 | HEART RATE: 73 BPM | HEIGHT: 65 IN | DIASTOLIC BLOOD PRESSURE: 70 MMHG

## 2022-07-05 DIAGNOSIS — I50.32 CHRONIC DIASTOLIC HEART FAILURE: ICD-10-CM

## 2022-07-05 DIAGNOSIS — E11.51 TYPE 2 DIABETES MELLITUS WITH DIABETIC PERIPHERAL ANGIOPATHY WITHOUT GANGRENE, WITH LONG-TERM CURRENT USE OF INSULIN: ICD-10-CM

## 2022-07-05 DIAGNOSIS — I15.2 HYPERTENSION ASSOCIATED WITH DIABETES: Chronic | ICD-10-CM

## 2022-07-05 DIAGNOSIS — I25.111 CORONARY ARTERY DISEASE INVOLVING NATIVE CORONARY ARTERY OF NATIVE HEART WITH ANGINA PECTORIS WITH DOCUMENTED SPASM: ICD-10-CM

## 2022-07-05 DIAGNOSIS — I36.1 NON-RHEUMATIC TRICUSPID VALVE INSUFFICIENCY: ICD-10-CM

## 2022-07-05 DIAGNOSIS — N18.6 END STAGE RENAL FAILURE ON DIALYSIS: ICD-10-CM

## 2022-07-05 DIAGNOSIS — I34.0 NON-RHEUMATIC MITRAL REGURGITATION: ICD-10-CM

## 2022-07-05 DIAGNOSIS — I25.2 HISTORY OF NON-ST ELEVATION MYOCARDIAL INFARCTION (NSTEMI): ICD-10-CM

## 2022-07-05 DIAGNOSIS — I25.119 CORONARY ARTERY DISEASE INVOLVING NATIVE CORONARY ARTERY OF NATIVE HEART WITH ANGINA PECTORIS: Primary | ICD-10-CM

## 2022-07-05 DIAGNOSIS — N18.6 ESRD (END STAGE RENAL DISEASE): ICD-10-CM

## 2022-07-05 DIAGNOSIS — Z76.0 MEDICATION REFILL: ICD-10-CM

## 2022-07-05 DIAGNOSIS — I25.119 CORONARY ARTERY DISEASE INVOLVING NATIVE CORONARY ARTERY OF NATIVE HEART WITH ANGINA PECTORIS: ICD-10-CM

## 2022-07-05 DIAGNOSIS — Z79.4 TYPE 2 DIABETES MELLITUS WITH DIABETIC PERIPHERAL ANGIOPATHY WITHOUT GANGRENE, WITH LONG-TERM CURRENT USE OF INSULIN: ICD-10-CM

## 2022-07-05 DIAGNOSIS — E11.59 HYPERTENSION ASSOCIATED WITH DIABETES: Chronic | ICD-10-CM

## 2022-07-05 DIAGNOSIS — Z86.73 HISTORY OF CVA (CEREBROVASCULAR ACCIDENT): ICD-10-CM

## 2022-07-05 DIAGNOSIS — I65.23 BILATERAL CAROTID ARTERY STENOSIS: ICD-10-CM

## 2022-07-05 DIAGNOSIS — Z01.810 PREOP CARDIOVASCULAR EXAM: ICD-10-CM

## 2022-07-05 DIAGNOSIS — Z99.2 END STAGE RENAL FAILURE ON DIALYSIS: ICD-10-CM

## 2022-07-05 DIAGNOSIS — I50.9 ACUTE CONGESTIVE HEART FAILURE, UNSPECIFIED HEART FAILURE TYPE: ICD-10-CM

## 2022-07-05 PROCEDURE — 3046F HEMOGLOBIN A1C LEVEL >9.0%: CPT | Mod: CPTII,S$GLB,, | Performed by: INTERNAL MEDICINE

## 2022-07-05 PROCEDURE — 93010 EKG 12-LEAD: ICD-10-PCS | Mod: ,,, | Performed by: STUDENT IN AN ORGANIZED HEALTH CARE EDUCATION/TRAINING PROGRAM

## 2022-07-05 PROCEDURE — 1159F MED LIST DOCD IN RCRD: CPT | Mod: CPTII,S$GLB,, | Performed by: INTERNAL MEDICINE

## 2022-07-05 PROCEDURE — 93005 ELECTROCARDIOGRAM TRACING: CPT

## 2022-07-05 PROCEDURE — 99499 UNLISTED E&M SERVICE: CPT | Mod: S$GLB,,, | Performed by: INTERNAL MEDICINE

## 2022-07-05 PROCEDURE — 3078F PR MOST RECENT DIASTOLIC BLOOD PRESSURE < 80 MM HG: ICD-10-PCS | Mod: CPTII,S$GLB,, | Performed by: INTERNAL MEDICINE

## 2022-07-05 PROCEDURE — 1126F PR PAIN SEVERITY QUANTIFIED, NO PAIN PRESENT: ICD-10-PCS | Mod: CPTII,S$GLB,, | Performed by: INTERNAL MEDICINE

## 2022-07-05 PROCEDURE — 3078F DIAST BP <80 MM HG: CPT | Mod: CPTII,S$GLB,, | Performed by: INTERNAL MEDICINE

## 2022-07-05 PROCEDURE — 93010 ELECTROCARDIOGRAM REPORT: CPT | Mod: ,,, | Performed by: STUDENT IN AN ORGANIZED HEALTH CARE EDUCATION/TRAINING PROGRAM

## 2022-07-05 PROCEDURE — 3046F PR MOST RECENT HEMOGLOBIN A1C LEVEL > 9.0%: ICD-10-PCS | Mod: CPTII,S$GLB,, | Performed by: INTERNAL MEDICINE

## 2022-07-05 PROCEDURE — 1160F RVW MEDS BY RX/DR IN RCRD: CPT | Mod: CPTII,S$GLB,, | Performed by: INTERNAL MEDICINE

## 2022-07-05 PROCEDURE — 3077F SYST BP >= 140 MM HG: CPT | Mod: CPTII,S$GLB,, | Performed by: INTERNAL MEDICINE

## 2022-07-05 PROCEDURE — 1101F PR PT FALLS ASSESS DOC 0-1 FALLS W/OUT INJ PAST YR: ICD-10-PCS | Mod: CPTII,S$GLB,, | Performed by: INTERNAL MEDICINE

## 2022-07-05 PROCEDURE — 99499 RISK ADDL DX/OHS AUDIT: ICD-10-PCS | Mod: S$GLB,,, | Performed by: INTERNAL MEDICINE

## 2022-07-05 PROCEDURE — 99999 PR PBB SHADOW E&M-EST. PATIENT-LVL III: CPT | Mod: PBBFAC,,, | Performed by: INTERNAL MEDICINE

## 2022-07-05 PROCEDURE — 3288F PR FALLS RISK ASSESSMENT DOCUMENTED: ICD-10-PCS | Mod: CPTII,S$GLB,, | Performed by: INTERNAL MEDICINE

## 2022-07-05 PROCEDURE — 3077F PR MOST RECENT SYSTOLIC BLOOD PRESSURE >= 140 MM HG: ICD-10-PCS | Mod: CPTII,S$GLB,, | Performed by: INTERNAL MEDICINE

## 2022-07-05 PROCEDURE — 4010F PR ACE/ARB THEARPY RXD/TAKEN: ICD-10-PCS | Mod: CPTII,S$GLB,, | Performed by: INTERNAL MEDICINE

## 2022-07-05 PROCEDURE — 1159F PR MEDICATION LIST DOCUMENTED IN MEDICAL RECORD: ICD-10-PCS | Mod: CPTII,S$GLB,, | Performed by: INTERNAL MEDICINE

## 2022-07-05 PROCEDURE — 1101F PT FALLS ASSESS-DOCD LE1/YR: CPT | Mod: CPTII,S$GLB,, | Performed by: INTERNAL MEDICINE

## 2022-07-05 PROCEDURE — 3288F FALL RISK ASSESSMENT DOCD: CPT | Mod: CPTII,S$GLB,, | Performed by: INTERNAL MEDICINE

## 2022-07-05 PROCEDURE — 1160F PR REVIEW ALL MEDS BY PRESCRIBER/CLIN PHARMACIST DOCUMENTED: ICD-10-PCS | Mod: CPTII,S$GLB,, | Performed by: INTERNAL MEDICINE

## 2022-07-05 PROCEDURE — 99214 PR OFFICE/OUTPT VISIT, EST, LEVL IV, 30-39 MIN: ICD-10-PCS | Mod: S$GLB,,, | Performed by: INTERNAL MEDICINE

## 2022-07-05 PROCEDURE — 99214 OFFICE O/P EST MOD 30 MIN: CPT | Mod: S$GLB,,, | Performed by: INTERNAL MEDICINE

## 2022-07-05 PROCEDURE — 4010F ACE/ARB THERAPY RXD/TAKEN: CPT | Mod: CPTII,S$GLB,, | Performed by: INTERNAL MEDICINE

## 2022-07-05 PROCEDURE — 1126F AMNT PAIN NOTED NONE PRSNT: CPT | Mod: CPTII,S$GLB,, | Performed by: INTERNAL MEDICINE

## 2022-07-05 PROCEDURE — 99999 PR PBB SHADOW E&M-EST. PATIENT-LVL III: ICD-10-PCS | Mod: PBBFAC,,, | Performed by: INTERNAL MEDICINE

## 2022-07-05 RX ORDER — ISOSORBIDE DINITRATE AND HYDRALAZINE HYDROCHLORIDE 37.5; 2 MG/1; MG/1
1 TABLET ORAL 3 TIMES DAILY
Qty: 90 TABLET | Refills: 1 | Status: SHIPPED | OUTPATIENT
Start: 2022-07-05 | End: 2022-07-27

## 2022-07-05 RX ORDER — TELMISARTAN 20 MG/1
20 TABLET ORAL DAILY
Qty: 90 TABLET | Refills: 1 | Status: SHIPPED | OUTPATIENT
Start: 2022-07-05 | End: 2023-05-25 | Stop reason: SDUPTHER

## 2022-07-05 RX ORDER — CLOPIDOGREL BISULFATE 75 MG/1
75 TABLET ORAL DAILY
Qty: 90 TABLET | Refills: 3 | Status: SHIPPED | OUTPATIENT
Start: 2022-07-05 | End: 2022-11-01

## 2022-07-05 RX ORDER — ASPIRIN 81 MG/1
81 TABLET ORAL DAILY
Qty: 90 TABLET | Refills: 1 | Status: SHIPPED | OUTPATIENT
Start: 2022-07-05 | End: 2023-02-07 | Stop reason: SDUPTHER

## 2022-07-05 RX ORDER — METOPROLOL TARTRATE 50 MG/1
50 TABLET ORAL 2 TIMES DAILY
Qty: 180 TABLET | Refills: 1 | Status: SHIPPED | OUTPATIENT
Start: 2022-07-05 | End: 2023-06-26 | Stop reason: SDUPTHER

## 2022-07-05 RX ORDER — SEMAGLUTIDE 1.34 MG/ML
0.25 INJECTION, SOLUTION SUBCUTANEOUS
Qty: 1 PEN | Refills: 1 | Status: SHIPPED | OUTPATIENT
Start: 2022-07-05 | End: 2022-10-04

## 2022-07-05 NOTE — PROGRESS NOTES
Subjective:   Patient ID:  Avril Monzon is a 77 y.o. female who presents for cardiac consult of Pre-op Exam      Palpitations   Associated symptoms include malaise/fatigue. Pertinent negatives include no chest pain or shortness of breath.   Chest Pain   Associated symptoms include malaise/fatigue. Pertinent negatives include no palpitations or shortness of breath.     The patient came in today for cardiac consult of Pre-op Exam    Avril Monzon is a 77 y.o. female pt with  CAD s/p previous NSTEMI with PCI of LAD in 12/18, HTN, HLD, DM type II, former tobacco abuse, ESRD on HD, anemia, history of CVA, Carotid artery disease, here for CV follow up.     Avril Monzon is a 77 y.o. female  admitted to ICU on NIV with a dx of acute on chronic systolic and diastolic CHF exacerbation , Volume overload , Uncontrolled HTN , Acute hypoxic respiratory failure  And CKD stage 5 . She was started on NIV , NTG drip and lasix drip with and improvement of her sx . She is (-) 2.3 lt since admission . She is wean off  BIPAP .   She cont on NTG drip and lasix drip . The BP has improved  . Her home BP medication were resume .   She has improved with IV diuresis, cardiology consulted for CHF. Discussed will need volume removal and will work up pulm HTN as outpt.    7/4/20 - changed to lasix 80 with BIdil, pt feels well ready for DC today. NO acute events overnight. Will follow up in CV clinic    7/20/20 - Hosp follow up  ECHO in hosp with elevated PA pressures, with mild to moderate TR/MR. She feels well, did not need oxygen at night. Swelling is stable/improved. No CP. Is active at home.     8/28/20  She has been feeling better but has been having more palpitations x 1 week. Stable dyspnea no chest pain. She feels more tired at times. BP stable at home, is on Torsemide now.    10/13/20  Holter with occ PVCs, freq PACs, AVG HR 72. No further palpitations.  BP elevated today 176/60. She also has pain in right shoulder blade, going to  right breast. She slept with arm above head once.     11/17/20  BP low today, she went to ER yesterday and received IVF. She felt fatigue, not lightheaded and was dyspneic. She started HD about a month ago. Will titrate and adjust meds.   She will receive blood today.     3/9/21  Mild to moderate carotid artery disease in 10/2020. Pt unsure of some of her meds, daughter isn't present. occ BP low 100s. She has back pain since fall a few years ago, gets tired/back pain when standing. No CP/SOB. She had both COVID vaccines.     3/26/21 - ER follow up    2 weeks ago - from notes  yesterday had dialysis- completed regularly. BP is elevated--this am: 190s/60s(possibly 189/68); has taken am metoprolol. Has continued post dialysis headache- took BC powder like med for headache- this normally relieves and it has not relieved headache.BP last night: 170s/60s last night. Took metoprolol 25 mg at bedtime.No black or bloody stools or blood in urine, no unusual bruising access site looks wdl.   Recheck [id number]: 215/65 advised daughter to bring pt to ED now for evaluation.    ER visit c/o elevated BP. Pt with hx of ESRD on HD M-W-F. She states this morning when she woke up at 0400 and her BP was 170 systolic. She states around 0900 this morning her BP was 190 systolic. She states she took her blood pressure medicine and came here. Pt states she has a slight headache but it is resolving.    BP in ER was normal, headache resolved.     7/2/21  Increased BB last visit. BP and HR well controlled today. She had missed BB for a few days her  had colon CA and surgery has been in hospital for a month so far. She has more back pain lately as well.   ECG - NSR, LAE    9/15/21  She has had issues getting to see her pain doc as she missed appts/changed appts due to availability. She has been having itching issues - she went to derm, possible had some allergies. BP and HR well controlled today.     12/14/21    Hosp DC - presents to the  Emergency Department for SOB, onset last night. Associated with orthopnea, nonproductive cough and wheezing. Pt dialyzes every M/W/F, and last dialyzed 3 days ago (Friday). Pt has not yet dialyzed today due to her sxs. Denies recent fever, chills, n/v/d, CP, weakness, numbness, dizziness, headache, and all other sxs at this time  Hospital Course:   Patient admitted to noncompliance with diet and increased Na intake over the weekend. She received dialysis yesterday and denies feeling SOB. Trop trended and case discussed with cards. Pateint denies CP. All home meds continued except for lisinopril as she reports being taken off. Plavix and statin scripts refilled She will get dialyzed again tomorrow MWF. In stable condition at time of discharge and patient agreeable with plan. Instructed to follow up with PCP for post hospital visit stay.     Elevated troponin in setting of HTN urgency and Acute CHF  No need for IV heparin gtt since chest pain free  Trend serial troponin  Check ECHO  Optimize meds for better BP control  Nephro consult for HD needs during admission  Continue ASA Statin Plavix Bidil, BB ACei  Today pt feels well, BP and HR stable.     2/17/22  Nuclear stress neg for ischemia, Scar noted. EF normal. No CP, has chronic ALVARADO with fatigue, stable. She had BNP in dec > 2K, has been getting more HD off lately.     7/5/22  She has upcoming colonoscopy on 7/7/22 with Dr. Goss. A1c in March > 10. Pt was out of plavix did not take it for a while but got refill.     Patient feels  no chest pain, no PND, no dizziness, no syncope, no CNS symptoms.    Patient has fairly good exercise tolerance.    Patient is compliant with medications.     Results for orders placed during the hospital encounter of 01/04/22    Nuclear Stress - Cardiology Interpreted    Interpretation Summary    Abnormal myocardial perfusion scan.    There is a severe intensity, moderate to large sized fixed defect consistent with scar in the  anteroapical wall(s).    The gated perfusion images showed an ejection fraction of 64% at rest. The gated perfusion images showed an ejection fraction of 65% post stress.    The EKG portion of this study is negative for ischemia.    During stress, rare PVCs are noted.      Carotid u/s  Conclusion    · There is 20-39% right Internal Carotid Stenosis.  · There is 40-49% left Internal Carotid Stenosis        Results for orders placed during the hospital encounter of 12/06/21    Echo    Interpretation Summary  · The left ventricle is normal in size with normal systolic function.  · The estimated ejection fraction is 60%.  · Grade II left ventricular diastolic dysfunction.  · The estimated PA systolic pressure is 75 mmHg.  · Normal right ventricular size with normal right ventricular systolic function.  · There is pulmonary hypertension.  · Intermediate central venous pressure (8 mmHg).  · Moderate mitral regurgitation.  · Moderate tricuspid regurgitation.  · Mild pulmonic regurgitation.      HOLTER 8/13/20  Predominant Rhythm  Sinus rhythm with heart rates varying between 57 and 96 bpm with an average of 72 bpm.   PVC    Ventricular Arrhythmias  There were occasional PVCs totalling 1078 and averaging 11.23 per hour.   PAC    Supraventricular Arrhythmias  There were frequent PACs totalling 6784 and averaging 70.67 per hour.         Past Medical History:   Diagnosis Date    Acute hypoxemic respiratory failure 11/26/2018    Anemia     Arthritis     back, hand, knees    Back pain     Cataract     CKD stage G4/A3, GFR 15 - 29 and albumin creatinine ratio >300 mg/g     GFR 40% Jan 2014 and 33% ub 3/2014 ([facility])    Coronary artery disease involving native coronary artery of native heart 11/30/2018    Diabetic retinopathy     DM (diabetes mellitus) type II controlled, neurological manifestation     Exudative age-related macular degeneration of left eye with active choroidal neovascularization 2/5/2019    GERD  (gastroesophageal reflux disease)     Glaucoma     Heart failure     Hyperlipidemia     Hypertension     Non-ST elevation MI (NSTEMI) 2018    NSTEMI (non-ST elevated myocardial infarction) 2018    Peripheral neuropathy     Polyneuropathy     Proteinuria     >6 mo     Seizures     [facility] 2014 -dick CT NRI showed small vessel    Stroke ,    Tobacco dependence     Type 2 diabetes with peripheral circulatory disorder, controlled        Past Surgical History:   Procedure Laterality Date    BREAST LUMPECTOMY Left     benign, when patient was 15 years old    BREAST MASS EXCISION      ,benign, age 15.    CATHETERIZATION OF BOTH LEFT AND RIGHT HEART N/A 2018    Procedure: CATHETERIZATION, HEART, BOTH LEFT AND RIGHT;  Surgeon: Michelle Holman MD;  Location: [facility] CATH LAB;  Service: Cardiology;  Laterality: N/A;    CATHETERIZATION OF BOTH LEFT AND RIGHT HEART N/A 2018    Procedure: CATHETERIZATION, HEART, BOTH LEFT AND RIGHT;  Surgeon: Michelle Holman MD;  Location: [facility] CATH LAB;  Service: Cardiology;  Laterality: N/A;    HYSTERECTOMY  1982    INSERTION OF SHUNT      LEFT HEART CATHETERIZATION Left 12/3/2018    Procedure: CATHETERIZATION, HEART, LEFT;  Surgeon: Michelle Holman MD;  Location: [facility] CATH LAB;  Service: Cardiology;  Laterality: Left;  LAD ATHERECTOMY STENT/ FEMORAL APPROACH    OOPHORECTOMY      wrist cyst Right 1980s    dorsal wrist cyst       Social History     Tobacco Use    Smoking status: Former Smoker     Packs/day: 1.50     Years: 30.00     Pack years: 45.00     Types: Cigarettes     Quit date: 1990     Years since quittin.5    Smokeless tobacco: Former User   Substance Use Topics    Alcohol use: No     Alcohol/week: 0.0 standard drinks    Drug use: No       Family History   Problem Relation Age of Onset    Diabetes Mother     Hyperlipidemia Mother     Hypertension Mother     Heart disease Mother         MI    Muscular dystrophy Son      Cancer Maternal Grandmother         colon       Patient's Medications   New Prescriptions    SEMAGLUTIDE (OZEMPIC) 0.25 MG OR 0.5 MG(2 MG/1.5 ML) PEN INJECTOR    Inject 0.25 mg into the skin every 7 days.   Previous Medications    ACCU-CHEK ARDEN PLUS METER MISC    USE TO TEST TWICE DAILY    BLOOD SUGAR DIAGNOSTIC STRP    1 strip by Misc.(Non-Drug; Combo Route) route 3 (three) times daily. relion ultima    BLOOD SUGAR DIAGNOSTIC STRP    To check BG 2 times daily, to use with insurance preferred meter    BLOOD-GLUCOSE METER KIT    To check BG 2 times daily, to use with insurance preferred meter    CYANOCOBALAMIN (VITAMIN B-12) 1000 MCG TABLET    Take 100 mcg by mouth once daily.    ERYTHROMYCIN (ROMYCIN) OPHTHALMIC OINTMENT    PLACE A SMALL AMOUNT INTO LEFT EYE THREE TIMES DAILY AS DIRECTED    FOLIC ACID (FOLVITE) 1 MG TABLET    Take 1 mg by mouth once daily.    HYDROXYZINE HCL (ATARAX) 25 MG TABLET    Take 1 tablet (25 mg total) by mouth 3 (three) times daily as needed for Itching.    INSULIN NPH/REG HUMAN (HUMULIN, 70/30,) 100 UNIT/ML (70-30) INPN PEN    To do 28 units subq in AM and 8 units subq in PM.  Dose to be done within 15 min before or after meal.    LANCETS (ACCU-CHEK SOFTCLIX LANCETS) MISC    1 lancet by Misc.(Non-Drug; Combo Route) route 3 (three) times daily.    LANCETS MIS    To check BG 2 times daily, to use with insurance preferred meter    LEXISCAN 0.4 MG/5 ML SYRG        SODIUM,POTASSIUM,MAG SULFATES (SUPREP BOWEL PREP KIT) 17.5-3.13-1.6 GRAM SOLR    Take as directed    TRIAMCINOLONE ACETONIDE 0.1% (KENALOG) 0.1 % CREAM    Apply topically 2 (two) times daily.   Modified Medications    Modified Medication Previous Medication    ASPIRIN (ECOTRIN) 81 MG EC TABLET aspirin (ECOTRIN) 81 MG EC tablet       Take 1 tablet (81 mg total) by mouth once daily.    Take 1 tablet (81 mg total) by mouth once daily.    CLOPIDOGREL (PLAVIX) 75 MG TABLET clopidogreL (PLAVIX) 75 mg tablet       Take 1 tablet (75 mg  "total) by mouth once daily.    Take 1 tablet (75 mg total) by mouth once daily.    ISOSORBIDE-HYDRALAZINE 20-37.5 MG (BIDIL) 20-37.5 MG TAB isosorbide-hydrALAZINE 20-37.5 mg (BIDIL) 20-37.5 mg Tab       Take 1 tablet by mouth 3 (three) times daily.    Take 1 tablet by mouth 3 (three) times daily.    METOPROLOL TARTRATE (LOPRESSOR) 50 MG TABLET metoprolol tartrate (LOPRESSOR) 50 MG tablet       Take 1 tablet (50 mg total) by mouth 2 (two) times daily.    Take 1 tablet (50 mg total) by mouth 2 (two) times daily.    TELMISARTAN (MICARDIS) 20 MG TAB telmisartan (MICARDIS) 20 MG Tab       Take 1 tablet (20 mg total) by mouth once daily.    Take 1 tablet (20 mg total) by mouth once daily.   Discontinued Medications    No medications on file       Review of Systems   Constitutional: Positive for malaise/fatigue.   HENT: Negative.    Eyes: Negative.    Respiratory: Negative for shortness of breath.    Cardiovascular: Negative for chest pain, palpitations and leg swelling.   Gastrointestinal: Negative.    Genitourinary: Negative.    Musculoskeletal: Negative.    Skin: Negative.    Neurological: Negative.    Endo/Heme/Allergies: Negative.    Psychiatric/Behavioral: Negative.    All 12 systems otherwise negative.      Wt Readings from Last 3 Encounters:   07/05/22 59 kg (130 lb)   04/28/22 59 kg (130 lb)   04/12/22 59.3 kg (130 lb 11.7 oz)     Temp Readings from Last 3 Encounters:   04/06/22 98 °F (36.7 °C)   02/08/22 97.7 °F (36.5 °C) (Temporal)   12/27/21 97.9 °F (36.6 °C)     BP Readings from Last 3 Encounters:   07/05/22 (!) 140/70   04/28/22 (!) 134/58   04/12/22 (!) 168/70     Pulse Readings from Last 3 Encounters:   07/05/22 73   04/28/22 67   04/12/22 65       BP (!) 140/70 (BP Location: Right arm, Patient Position: Sitting, BP Method: Large (Manual))   Pulse 73   Ht 5' 5" (1.651 m)   Wt 59 kg (130 lb)   LMP  (LMP Unknown)   SpO2 96%   BMI 21.63 kg/m²     Objective:   Physical Exam  Vitals and nursing note " reviewed.   Constitutional:       General: She is not in acute distress.     Appearance: She is well-developed. She is not diaphoretic.   HENT:      Head: Normocephalic and atraumatic.      Nose: Nose normal.   Eyes:      General: No scleral icterus.     Conjunctiva/sclera: Conjunctivae normal.   Neck:      Thyroid: No thyromegaly.      Vascular: No JVD.   Cardiovascular:      Rate and Rhythm: Normal rate and regular rhythm.      Heart sounds: S1 normal and S2 normal. Murmur heard.     No friction rub. No gallop. No S3 or S4 sounds.   Pulmonary:      Effort: Pulmonary effort is normal. No respiratory distress.      Breath sounds: Normal breath sounds. No stridor. No wheezing or rales.   Chest:      Chest wall: No tenderness.   Abdominal:      General: Bowel sounds are normal. There is no distension.      Palpations: Abdomen is soft. There is no mass.      Tenderness: There is no abdominal tenderness. There is no rebound.   Genitourinary:     Comments: Deferred  Musculoskeletal:         General: No tenderness or deformity. Normal range of motion.      Cervical back: Normal range of motion and neck supple.   Lymphadenopathy:      Cervical: No cervical adenopathy.   Skin:     General: Skin is warm and dry.      Coloration: Skin is not pale.      Findings: No erythema or rash.   Neurological:      Mental Status: She is alert and oriented to person, place, and time.      Motor: No abnormal muscle tone.      Coordination: Coordination normal.   Psychiatric:         Behavior: Behavior normal.         Thought Content: Thought content normal.         Judgment: Judgment normal.         Lab Results   Component Value Date     04/28/2022    K 4.6 04/28/2022     04/28/2022    CO2 25 04/28/2022    BUN 54 (H) 04/28/2022    CREATININE 5.7 (H) 04/28/2022     (H) 04/28/2022    HGBA1C 10.8 (H) 03/08/2022    HGBA1C 7.3 04/26/2021    MG 1.9 10/22/2020    AST 20 04/28/2022    ALT 20 04/28/2022    ALBUMIN 3.6 04/28/2022     PROT 6.6 04/28/2022    BILITOT 0.3 04/28/2022    WBC 8.22 12/20/2021    HGB 8.0 (L) 12/20/2021    HCT 24.9 (L) 12/20/2021     (H) 12/20/2021     12/20/2021    INR 0.9 06/15/2020    TSH 1.792 01/26/2021    CHOL 246 (H) 10/19/2021    HDL 62 10/19/2021    LDLCALC 159.6 (H) 10/19/2021    TRIG 122 10/19/2021    BNP 2,742 (H) 12/20/2021     Assessment:      1. Coronary artery disease involving native coronary artery of native heart with angina pectoris    2. End stage renal failure on dialysis    3. Chronic diastolic heart failure    4. Non-rheumatic mitral regurgitation    5. Coronary artery disease involving native coronary artery of native heart with angina pectoris with documented spasm    6. Non-rheumatic tricuspid valve insufficiency    7. Bilateral carotid artery stenosis    8. History of non-ST elevation myocardial infarction (NSTEMI)    9. History of CVA (cerebrovascular accident)    10. Type 2 diabetes mellitus with diabetic peripheral angiopathy without gangrene, with long-term current use of insulin    11. Preop cardiovascular exam    12. Hypertension associated with diabetes    13. ESRD (end stage renal disease)    14. Medication refill        Plan:   1. HFpEF with mild to mod MR/TR; BNP 2742  - was on torsemide and bumex but rec HD for volume removal   - monitor BP and weights  - low salt diet  - rec compression stockings    2. CAD s/p PCI of LAD 12/2018; h/o NSTEMI with elevated trop sec to demand  - 1/2022 Nuclear stress neg for ischemia, Scar noted. EF normal.   - cont asa, plavix, statin, BB  - stable    3. HTN - stable  - cont meds and titrate   - cont Bidil    4. Carotid artery stenosis , mild to mod  - cont to monitor  - cont asa, statin  - stable on u/s 9/2021    5. ESRD on HD with anemia  - cont HD    6. H/o CVA   - cont asa, statin, plavix  - refer to neuro     7. Palpitations  - Holter -  occ PVCs, freq PACs, AVG HR 72   - cont BB - increased BB to 50 mg BID  - dec caffeine     8.  Anemia  - s/p blood  - procrit with HD    9. Back pain  - f/u pain management    10. Itching/allergies?  - refer to allergies, f/u derm    11. DM2 A1c 8.6 --> 10.8  - uncontrolled, f/u PCP/Diabetic management  - is taking insulin  - start Ozempic 0.25 to improve sugars/CV risk eval    12. Preop CV eval - colonoscopy with Dr. Goss  - low CV risk   - no cardiac contraindication to holding plavix 5 days prior to procedure  - needs to discus with neurologist regarding holding plavix    Thank you for allowing me to participate in this patient's care. Please do not hesitate to contact me with any questions or concerns. Consult note has been forwarded to the referral physician.

## 2022-07-07 ENCOUNTER — OUTPATIENT CASE MANAGEMENT (OUTPATIENT)
Dept: ADMINISTRATIVE | Facility: OTHER | Age: 77
End: 2022-07-07
Payer: MEDICARE

## 2022-07-07 NOTE — PROGRESS NOTES
7/7/22 - Mrs Mackenzie is scheduled for follow up contact from this  today, but noted on chart review she is having a colonoscopy today. Will attempt to follow up next week. Debbie Lopez RN

## 2022-07-08 DIAGNOSIS — Z12.11 COLON CANCER SCREENING: Primary | ICD-10-CM

## 2022-07-11 ENCOUNTER — OUTPATIENT CASE MANAGEMENT (OUTPATIENT)
Dept: ADMINISTRATIVE | Facility: OTHER | Age: 77
End: 2022-07-11
Payer: MEDICARE

## 2022-07-11 ENCOUNTER — TELEPHONE (OUTPATIENT)
Dept: FAMILY MEDICINE | Facility: CLINIC | Age: 77
End: 2022-07-11
Payer: MEDICARE

## 2022-07-11 ENCOUNTER — TELEPHONE (OUTPATIENT)
Dept: FAMILY MEDICINE | Facility: CLINIC | Age: 77
End: 2022-07-11

## 2022-07-11 ENCOUNTER — TELEPHONE (OUTPATIENT)
Dept: TRANSPLANT | Facility: CLINIC | Age: 77
End: 2022-07-11

## 2022-07-11 ENCOUNTER — TELEPHONE (OUTPATIENT)
Dept: TRANSPLANT | Facility: CLINIC | Age: 77
End: 2022-07-11
Payer: MEDICARE

## 2022-07-11 DIAGNOSIS — E11.51 TYPE 2 DIABETES MELLITUS WITH DIABETIC PERIPHERAL ANGIOPATHY WITHOUT GANGRENE, WITH LONG-TERM CURRENT USE OF INSULIN: Primary | ICD-10-CM

## 2022-07-11 DIAGNOSIS — Z79.4 TYPE 2 DIABETES MELLITUS WITH DIABETIC PERIPHERAL ANGIOPATHY WITHOUT GANGRENE, WITH LONG-TERM CURRENT USE OF INSULIN: Primary | ICD-10-CM

## 2022-07-11 NOTE — PROGRESS NOTES
Outpatient Care Management  Plan of Care Follow Up Visit    Patient: Avril Monzon  MRN: [id number]  Date of Service: 07/11/2022  Completed by: Debbie Lopez RN  Referral Date: 12/20/2021  Program: Case Management (High Risk)    Reason for Visit   Patient presents with    Update Plan Of Care       Brief Summary: Phone contact with Mrs Mackenzie's daughter, Elizabeth, and message to A JESSENIA Romero, that Mrs Mackenzie has an appt with this afternoon. Messaged provider to advise she had a CGM sensor applied to her arm last week at a provider appt, but she is not monitoring blood sugars as Elizabeth reports they don't have a reader to scan over sensor. Advised to take all CGM supplies they were given to the provider appt this afternoon and this CM will follow up with them in 10-14 days. She agreed to above.     Patient Summary     Involvement of Care:  Do I have permission to speak with other family members about your care?       Patient Reported Labs & Vitals:  1.  Any Patient Reported Labs & Vitals?     2.  Patient Reported Blood Pressure:     3.  Patient Reported Pulse:     4.  Patient Reported Weight (Kg):     5.  Patient Reported Blood Glucose (mg/dl):       Medical and social history was reviewed with patient and/or caregiver.     Clinical Assessment     Reviewed and provided basic information on available community resources for mental health, transportation, wellness resources, and palliative care programs with patient and/or caregiver.     Complex Care Plan     Care plan was discussed and completed today with input from patient and/or caregiver.    Patient Instructions     Instructions were provided via the [facility] patient resources and are available for the patient to view on the patient portal.    Next Steps: Continue to review diabetes and CHF care plan tasks. Debbie Lopez RN    No follow-ups on file.    Todays OPCM Self-Management Care Plan was developed with the patients/caregivers input and was based on  identified barriers from todays assessment.  Goals were written today with the patient/caregiver and the patient has agreed to work towards these goals to improve his/her overall well-being. Patient verbalized understanding of the care plan, goals, and all of today's instructions. Encouraged patient/caregiver to communicate with his/her physician and health care team about health conditions and the treatment plan.  Provided my contact information today and encouraged patient/caregiver to call me with any questions as needed.

## 2022-07-11 NOTE — TELEPHONE ENCOUNTER
----- Message from Debbie Lopez RN sent at 7/11/2022 11:32 AM CDT -----  Jian/Staff,     Mrs Mackenzie is scheduled to see you today at 1 PM and I wanted to see if you or your staff may be able to address the issue she is having with her CGM. Her daughter, Elizabeth, reports it was placed on her arm last week, but they are not monitoring her blood sugars because they don't know how. Apparently they don't have a reader or have lost it. I told them to bring everything they were given for the CGM with them to the  appt.     Thank you,     Debbie Lopez RN, [facility]  Outpatient Case Management  [phone]  Ext [phone]  [email or web address]

## 2022-07-12 ENCOUNTER — HOSPITAL ENCOUNTER (INPATIENT)
Facility: HOSPITAL | Age: 77
LOS: 15 days | Discharge: HOME HEALTH | DRG: 233 | End: 2022-07-27
Attending: EMERGENCY MEDICINE | Admitting: INTERNAL MEDICINE
Payer: MEDICARE

## 2022-07-12 DIAGNOSIS — E87.20 METABOLIC ACIDOSIS: ICD-10-CM

## 2022-07-12 DIAGNOSIS — I50.9 CONGESTIVE HEART FAILURE, UNSPECIFIED HF CHRONICITY, UNSPECIFIED HEART FAILURE TYPE: ICD-10-CM

## 2022-07-12 DIAGNOSIS — Z98.890 POST-OPERATIVE STATE: ICD-10-CM

## 2022-07-12 DIAGNOSIS — N18.6 ESRD (END STAGE RENAL DISEASE): ICD-10-CM

## 2022-07-12 DIAGNOSIS — I25.10 CAD (CORONARY ARTERY DISEASE): ICD-10-CM

## 2022-07-12 DIAGNOSIS — E11.59 HYPERTENSION ASSOCIATED WITH DIABETES: Chronic | ICD-10-CM

## 2022-07-12 DIAGNOSIS — Z95.1 S/P CABG (CORONARY ARTERY BYPASS GRAFT): ICD-10-CM

## 2022-07-12 DIAGNOSIS — I15.2 HYPERTENSION ASSOCIATED WITH DIABETES: Chronic | ICD-10-CM

## 2022-07-12 DIAGNOSIS — R79.89 ELEVATED TROPONIN: ICD-10-CM

## 2022-07-12 DIAGNOSIS — E11.10 DKA (DIABETIC KETOACIDOSIS): ICD-10-CM

## 2022-07-12 DIAGNOSIS — I21.4 NSTEMI (NON-ST ELEVATED MYOCARDIAL INFARCTION): ICD-10-CM

## 2022-07-12 DIAGNOSIS — R07.9 CHEST PAIN: ICD-10-CM

## 2022-07-12 DIAGNOSIS — E87.5 HYPERKALEMIA: ICD-10-CM

## 2022-07-12 DIAGNOSIS — R79.89 ELEVATED TROPONIN I LEVEL: ICD-10-CM

## 2022-07-12 DIAGNOSIS — E13.10 DIABETIC KETOACIDOSIS WITHOUT COMA ASSOCIATED WITH OTHER SPECIFIED DIABETES MELLITUS: ICD-10-CM

## 2022-07-12 DIAGNOSIS — Z99.11 ON MECHANICALLY ASSISTED VENTILATION: ICD-10-CM

## 2022-07-12 DIAGNOSIS — I25.10 CORONARY ARTERY DISEASE INVOLVING NATIVE HEART, UNSPECIFIED VESSEL OR LESION TYPE, UNSPECIFIED WHETHER ANGINA PRESENT: ICD-10-CM

## 2022-07-12 LAB
ALBUMIN SERPL BCP-MCNC: 3.5 G/DL (ref 3.5–5.2)
ALLENS TEST: ABNORMAL
ALP SERPL-CCNC: 115 U/L (ref 55–135)
ALT SERPL W/O P-5'-P-CCNC: 90 U/L (ref 10–44)
ANION GAP SERPL CALC-SCNC: 17 MMOL/L (ref 8–16)
ANION GAP SERPL CALC-SCNC: 23 MMOL/L (ref 8–16)
APTT BLDCRRT: 24.4 SEC (ref 21–32)
APTT BLDCRRT: 55.3 SEC (ref 21–32)
APTT BLDCRRT: >150 SEC (ref 21–32)
AST SERPL-CCNC: 139 U/L (ref 10–40)
B-OH-BUTYR BLD STRIP-SCNC: 1.8 MMOL/L (ref 0–0.5)
BASOPHILS # BLD AUTO: 0.01 K/UL (ref 0–0.2)
BASOPHILS NFR BLD: 0.1 % (ref 0–1.9)
BILIRUB SERPL-MCNC: 0.6 MG/DL (ref 0.1–1)
BUN SERPL-MCNC: 35 MG/DL (ref 8–23)
BUN SERPL-MCNC: 78 MG/DL (ref 8–23)
CALCIUM SERPL-MCNC: 9.2 MG/DL (ref 8.7–10.5)
CALCIUM SERPL-MCNC: 9.3 MG/DL (ref 8.7–10.5)
CHLORIDE SERPL-SCNC: 102 MMOL/L (ref 95–110)
CHLORIDE SERPL-SCNC: 95 MMOL/L (ref 95–110)
CO2 SERPL-SCNC: 12 MMOL/L (ref 23–29)
CO2 SERPL-SCNC: 26 MMOL/L (ref 23–29)
CREAT SERPL-MCNC: 3.6 MG/DL (ref 0.5–1.4)
CREAT SERPL-MCNC: 7.1 MG/DL (ref 0.5–1.4)
CTP QC/QA: YES
DELSYS: ABNORMAL
DIFFERENTIAL METHOD: ABNORMAL
EOSINOPHIL # BLD AUTO: 0 K/UL (ref 0–0.5)
EOSINOPHIL NFR BLD: 0 % (ref 0–8)
ERYTHROCYTE [DISTWIDTH] IN BLOOD BY AUTOMATED COUNT: 14.6 % (ref 11.5–14.5)
EST. GFR  (AFRICAN AMERICAN): 14 ML/MIN/1.73 M^2
EST. GFR  (AFRICAN AMERICAN): 6 ML/MIN/1.73 M^2
EST. GFR  (NON AFRICAN AMERICAN): 12 ML/MIN/1.73 M^2
EST. GFR  (NON AFRICAN AMERICAN): 5 ML/MIN/1.73 M^2
GLUCOSE SERPL-MCNC: 184 MG/DL (ref 70–110)
GLUCOSE SERPL-MCNC: 551 MG/DL (ref 70–110)
HCO3 UR-SCNC: 18.6 MMOL/L (ref 24–28)
HCT VFR BLD AUTO: 31.6 % (ref 37–48.5)
HGB BLD-MCNC: 10 G/DL (ref 12–16)
IMM GRANULOCYTES # BLD AUTO: 0.02 K/UL (ref 0–0.04)
IMM GRANULOCYTES NFR BLD AUTO: 0.3 % (ref 0–0.5)
INR PPP: 1 (ref 0.8–1.2)
LACTATE SERPL-SCNC: 2.1 MMOL/L (ref 0.5–2.2)
LYMPHOCYTES # BLD AUTO: 0.3 K/UL (ref 1–4.8)
LYMPHOCYTES NFR BLD: 3.7 % (ref 18–48)
MAGNESIUM SERPL-MCNC: 2.1 MG/DL (ref 1.6–2.6)
MCH RBC QN AUTO: 31.9 PG (ref 27–31)
MCHC RBC AUTO-ENTMCNC: 31.6 G/DL (ref 32–36)
MCV RBC AUTO: 101 FL (ref 82–98)
MONOCYTES # BLD AUTO: 0.3 K/UL (ref 0.3–1)
MONOCYTES NFR BLD: 4.1 % (ref 4–15)
NEUTROPHILS # BLD AUTO: 7.2 K/UL (ref 1.8–7.7)
NEUTROPHILS NFR BLD: 91.8 % (ref 38–73)
NRBC BLD-RTO: 0 /100 WBC
PCO2 BLDA: 38.2 MMHG (ref 35–45)
PH SMN: 7.3 [PH] (ref 7.35–7.45)
PHOSPHATE SERPL-MCNC: 3.2 MG/DL (ref 2.7–4.5)
PLATELET # BLD AUTO: 194 K/UL (ref 150–450)
PMV BLD AUTO: 9.9 FL (ref 9.2–12.9)
PO2 BLDA: 30 MMHG (ref 40–60)
POC BE: -8 MMOL/L
POC SATURATED O2: 50 % (ref 95–100)
POCT GLUCOSE: 155 MG/DL (ref 70–110)
POCT GLUCOSE: 160 MG/DL (ref 70–110)
POCT GLUCOSE: 174 MG/DL (ref 70–110)
POCT GLUCOSE: 179 MG/DL (ref 70–110)
POCT GLUCOSE: 214 MG/DL (ref 70–110)
POCT GLUCOSE: 223 MG/DL (ref 70–110)
POCT GLUCOSE: 272 MG/DL (ref 70–110)
POCT GLUCOSE: 306 MG/DL (ref 70–110)
POCT GLUCOSE: 347 MG/DL (ref 70–110)
POCT GLUCOSE: 425 MG/DL (ref 70–110)
POCT GLUCOSE: >500 MG/DL (ref 70–110)
POCT GLUCOSE: >500 MG/DL (ref 70–110)
POTASSIUM SERPL-SCNC: 3.8 MMOL/L (ref 3.5–5.1)
POTASSIUM SERPL-SCNC: 6.1 MMOL/L (ref 3.5–5.1)
PROT SERPL-MCNC: 7.3 G/DL (ref 6–8.4)
PROTHROMBIN TIME: 10.3 SEC (ref 9–12.5)
RBC # BLD AUTO: 3.13 M/UL (ref 4–5.4)
SAMPLE: ABNORMAL
SARS-COV-2 RDRP RESP QL NAA+PROBE: NEGATIVE
SITE: ABNORMAL
SODIUM SERPL-SCNC: 137 MMOL/L (ref 136–145)
SODIUM SERPL-SCNC: 138 MMOL/L (ref 136–145)
TROPONIN I SERPL DL<=0.01 NG/ML-MCNC: 12.58 NG/ML (ref 0–0.03)
TROPONIN I SERPL DL<=0.01 NG/ML-MCNC: >50 NG/ML (ref 0–0.03)
TROPONIN I SERPL DL<=0.01 NG/ML-MCNC: >50 NG/ML (ref 0–0.03)
WBC # BLD AUTO: 7.82 K/UL (ref 3.9–12.7)

## 2022-07-12 PROCEDURE — 96374 THER/PROPH/DIAG INJ IV PUSH: CPT | Mod: 59

## 2022-07-12 PROCEDURE — 96376 TX/PRO/DX INJ SAME DRUG ADON: CPT

## 2022-07-12 PROCEDURE — 93010 EKG 12-LEAD: ICD-10-PCS | Mod: ,,, | Performed by: INTERNAL MEDICINE

## 2022-07-12 PROCEDURE — 25000003 PHARM REV CODE 250: Performed by: INTERNAL MEDICINE

## 2022-07-12 PROCEDURE — 85025 COMPLETE CBC W/AUTO DIFF WBC: CPT | Performed by: EMERGENCY MEDICINE

## 2022-07-12 PROCEDURE — 84484 ASSAY OF TROPONIN QUANT: CPT | Performed by: EMERGENCY MEDICINE

## 2022-07-12 PROCEDURE — 93005 ELECTROCARDIOGRAM TRACING: CPT

## 2022-07-12 PROCEDURE — 99222 PR INITIAL HOSPITAL CARE,LEVL II: ICD-10-PCS | Mod: ,,, | Performed by: INTERNAL MEDICINE

## 2022-07-12 PROCEDURE — 80100014 HC HEMODIALYSIS 1:1

## 2022-07-12 PROCEDURE — 99223 PR INITIAL HOSPITAL CARE,LEVL III: ICD-10-PCS | Mod: ,,, | Performed by: INTERNAL MEDICINE

## 2022-07-12 PROCEDURE — 93010 ELECTROCARDIOGRAM REPORT: CPT | Mod: ,,, | Performed by: INTERNAL MEDICINE

## 2022-07-12 PROCEDURE — 99900035 HC TECH TIME PER 15 MIN (STAT)

## 2022-07-12 PROCEDURE — C9399 UNCLASSIFIED DRUGS OR BIOLOG: HCPCS | Performed by: NURSE PRACTITIONER

## 2022-07-12 PROCEDURE — 83605 ASSAY OF LACTIC ACID: CPT | Performed by: INTERNAL MEDICINE

## 2022-07-12 PROCEDURE — 99291 CRITICAL CARE FIRST HOUR: CPT | Mod: 25

## 2022-07-12 PROCEDURE — 99291 PR CRITICAL CARE, E/M 30-74 MINUTES: ICD-10-PCS | Mod: ,,, | Performed by: INTERNAL MEDICINE

## 2022-07-12 PROCEDURE — 99222 1ST HOSP IP/OBS MODERATE 55: CPT | Mod: ,,, | Performed by: INTERNAL MEDICINE

## 2022-07-12 PROCEDURE — 80053 COMPREHEN METABOLIC PANEL: CPT | Performed by: EMERGENCY MEDICINE

## 2022-07-12 PROCEDURE — 84484 ASSAY OF TROPONIN QUANT: CPT | Mod: 91 | Performed by: INTERNAL MEDICINE

## 2022-07-12 PROCEDURE — 96366 THER/PROPH/DIAG IV INF ADDON: CPT

## 2022-07-12 PROCEDURE — 63600175 PHARM REV CODE 636 W HCPCS: Performed by: EMERGENCY MEDICINE

## 2022-07-12 PROCEDURE — 80048 BASIC METABOLIC PNL TOTAL CA: CPT | Mod: XB | Performed by: INTERNAL MEDICINE

## 2022-07-12 PROCEDURE — 25000242 PHARM REV CODE 250 ALT 637 W/ HCPCS: Performed by: EMERGENCY MEDICINE

## 2022-07-12 PROCEDURE — 96365 THER/PROPH/DIAG IV INF INIT: CPT

## 2022-07-12 PROCEDURE — 85730 THROMBOPLASTIN TIME PARTIAL: CPT | Mod: 91 | Performed by: INTERNAL MEDICINE

## 2022-07-12 PROCEDURE — 25000003 PHARM REV CODE 250: Performed by: NURSE PRACTITIONER

## 2022-07-12 PROCEDURE — 36415 COLL VENOUS BLD VENIPUNCTURE: CPT | Performed by: INTERNAL MEDICINE

## 2022-07-12 PROCEDURE — 83735 ASSAY OF MAGNESIUM: CPT | Performed by: INTERNAL MEDICINE

## 2022-07-12 PROCEDURE — 82010 KETONE BODYS QUAN: CPT | Performed by: EMERGENCY MEDICINE

## 2022-07-12 PROCEDURE — 99223 1ST HOSP IP/OBS HIGH 75: CPT | Mod: ,,, | Performed by: INTERNAL MEDICINE

## 2022-07-12 PROCEDURE — 25000003 PHARM REV CODE 250: Performed by: EMERGENCY MEDICINE

## 2022-07-12 PROCEDURE — U0002 COVID-19 LAB TEST NON-CDC: HCPCS | Performed by: EMERGENCY MEDICINE

## 2022-07-12 PROCEDURE — 83036 HEMOGLOBIN GLYCOSYLATED A1C: CPT | Performed by: INTERNAL MEDICINE

## 2022-07-12 PROCEDURE — 85730 THROMBOPLASTIN TIME PARTIAL: CPT | Performed by: EMERGENCY MEDICINE

## 2022-07-12 PROCEDURE — 82962 GLUCOSE BLOOD TEST: CPT

## 2022-07-12 PROCEDURE — 11000001 HC ACUTE MED/SURG PRIVATE ROOM

## 2022-07-12 PROCEDURE — 85610 PROTHROMBIN TIME: CPT | Performed by: EMERGENCY MEDICINE

## 2022-07-12 PROCEDURE — 82803 BLOOD GASES ANY COMBINATION: CPT

## 2022-07-12 PROCEDURE — 93010 ELECTROCARDIOGRAM REPORT: CPT | Mod: 76,,, | Performed by: INTERNAL MEDICINE

## 2022-07-12 PROCEDURE — 84100 ASSAY OF PHOSPHORUS: CPT | Performed by: INTERNAL MEDICINE

## 2022-07-12 PROCEDURE — S5010 5% DEXTROSE AND 0.45% SALINE: HCPCS | Performed by: INTERNAL MEDICINE

## 2022-07-12 PROCEDURE — 99291 CRITICAL CARE FIRST HOUR: CPT | Mod: ,,, | Performed by: INTERNAL MEDICINE

## 2022-07-12 RX ORDER — BENZONATATE 200 MG/1
200 CAPSULE ORAL 3 TIMES DAILY
COMMUNITY
Start: 2022-07-11 | End: 2022-08-07

## 2022-07-12 RX ORDER — METOPROLOL TARTRATE 25 MG/1
50 TABLET, FILM COATED ORAL 2 TIMES DAILY
Status: DISCONTINUED | OUTPATIENT
Start: 2022-07-12 | End: 2022-07-20

## 2022-07-12 RX ORDER — AMOXICILLIN 250 MG
2 CAPSULE ORAL DAILY
Status: DISCONTINUED | OUTPATIENT
Start: 2022-07-13 | End: 2022-07-20 | Stop reason: HOSPADM

## 2022-07-12 RX ORDER — DEXTROSE MONOHYDRATE AND SODIUM CHLORIDE 5; .45 G/100ML; G/100ML
INJECTION, SOLUTION INTRAVENOUS CONTINUOUS
Status: DISCONTINUED | OUTPATIENT
Start: 2022-07-13 | End: 2022-07-13

## 2022-07-12 RX ORDER — ACETAMINOPHEN 325 MG/1
650 TABLET ORAL
Status: COMPLETED | OUTPATIENT
Start: 2022-07-12 | End: 2022-07-12

## 2022-07-12 RX ORDER — GLUCAGON 1 MG
1 KIT INJECTION
Status: DISCONTINUED | OUTPATIENT
Start: 2022-07-12 | End: 2022-07-20 | Stop reason: HOSPADM

## 2022-07-12 RX ORDER — DEXTROSE MONOHYDRATE 100 MG/ML
INJECTION, SOLUTION INTRAVENOUS
Status: DISCONTINUED | OUTPATIENT
Start: 2022-07-12 | End: 2022-07-20 | Stop reason: HOSPADM

## 2022-07-12 RX ORDER — SODIUM CHLORIDE 9 MG/ML
125 INJECTION, SOLUTION INTRAVENOUS CONTINUOUS
Status: DISCONTINUED | OUTPATIENT
Start: 2022-07-12 | End: 2022-07-12

## 2022-07-12 RX ORDER — SODIUM CHLORIDE 9 MG/ML
INJECTION, SOLUTION INTRAVENOUS CONTINUOUS
Status: DISCONTINUED | OUTPATIENT
Start: 2022-07-12 | End: 2022-07-12

## 2022-07-12 RX ORDER — HEPARIN SODIUM,PORCINE/D5W 25000/250
0-40 INTRAVENOUS SOLUTION INTRAVENOUS CONTINUOUS
Status: DISCONTINUED | OUTPATIENT
Start: 2022-07-12 | End: 2022-07-13

## 2022-07-12 RX ORDER — SODIUM CHLORIDE 9 MG/ML
INJECTION, SOLUTION INTRAVENOUS ONCE
Status: CANCELLED | OUTPATIENT
Start: 2022-07-12 | End: 2022-07-12

## 2022-07-12 RX ORDER — SODIUM CHLORIDE 0.9 % (FLUSH) 0.9 %
10 SYRINGE (ML) INJECTION
Status: DISCONTINUED | OUTPATIENT
Start: 2022-07-12 | End: 2022-07-27 | Stop reason: HOSPADM

## 2022-07-12 RX ORDER — INSULIN ASPART 100 [IU]/ML
0-5 INJECTION, SOLUTION INTRAVENOUS; SUBCUTANEOUS
Status: DISCONTINUED | OUTPATIENT
Start: 2022-07-12 | End: 2022-07-20 | Stop reason: HOSPADM

## 2022-07-12 RX ORDER — ASPIRIN 81 MG/1
81 TABLET ORAL DAILY
Status: DISCONTINUED | OUTPATIENT
Start: 2022-07-12 | End: 2022-07-20

## 2022-07-12 RX ORDER — SODIUM CHLORIDE 9 MG/ML
INJECTION, SOLUTION INTRAVENOUS
Status: CANCELLED | OUTPATIENT
Start: 2022-07-12

## 2022-07-12 RX ORDER — IBUPROFEN 200 MG
24 TABLET ORAL
Status: DISCONTINUED | OUTPATIENT
Start: 2022-07-12 | End: 2022-07-20 | Stop reason: HOSPADM

## 2022-07-12 RX ORDER — FAMOTIDINE 20 MG/1
20 TABLET, FILM COATED ORAL DAILY
Status: DISCONTINUED | OUTPATIENT
Start: 2022-07-13 | End: 2022-07-20

## 2022-07-12 RX ORDER — ASPIRIN 325 MG
325 TABLET ORAL
Status: COMPLETED | OUTPATIENT
Start: 2022-07-12 | End: 2022-07-12

## 2022-07-12 RX ORDER — CLOPIDOGREL BISULFATE 75 MG/1
75 TABLET ORAL DAILY
Status: DISCONTINUED | OUTPATIENT
Start: 2022-07-12 | End: 2022-07-13

## 2022-07-12 RX ORDER — IBUPROFEN 200 MG
16 TABLET ORAL
Status: DISCONTINUED | OUTPATIENT
Start: 2022-07-12 | End: 2022-07-20 | Stop reason: HOSPADM

## 2022-07-12 RX ORDER — DEXTROSE MONOHYDRATE AND SODIUM CHLORIDE 5; .45 G/100ML; G/100ML
125 INJECTION, SOLUTION INTRAVENOUS CONTINUOUS PRN
Status: DISCONTINUED | OUTPATIENT
Start: 2022-07-12 | End: 2022-07-12

## 2022-07-12 RX ORDER — ONDANSETRON 2 MG/ML
4 INJECTION INTRAMUSCULAR; INTRAVENOUS EVERY 6 HOURS PRN
Status: DISCONTINUED | OUTPATIENT
Start: 2022-07-12 | End: 2022-07-12

## 2022-07-12 RX ORDER — ACETAMINOPHEN 325 MG/1
650 TABLET ORAL EVERY 4 HOURS PRN
Status: DISCONTINUED | OUTPATIENT
Start: 2022-07-12 | End: 2022-07-20 | Stop reason: SDUPTHER

## 2022-07-12 RX ORDER — NITROGLYCERIN 0.4 MG/1
0.4 TABLET SUBLINGUAL EVERY 5 MIN PRN
Status: DISCONTINUED | OUTPATIENT
Start: 2022-07-12 | End: 2022-07-20 | Stop reason: HOSPADM

## 2022-07-12 RX ORDER — DEXTROSE MONOHYDRATE AND SODIUM CHLORIDE 5; .45 G/100ML; G/100ML
INJECTION, SOLUTION INTRAVENOUS CONTINUOUS
Status: DISCONTINUED | OUTPATIENT
Start: 2022-07-13 | End: 2022-07-12

## 2022-07-12 RX ORDER — AZELASTINE 1 MG/ML
SPRAY, METERED NASAL
COMMUNITY
Start: 2022-07-11 | End: 2022-08-25

## 2022-07-12 RX ADMIN — HEPARIN SODIUM AND DEXTROSE 12 UNITS/KG/HR: 10000; 5 INJECTION INTRAVENOUS at 12:07

## 2022-07-12 RX ADMIN — METOPROLOL TARTRATE 50 MG: 50 TABLET, FILM COATED ORAL at 09:07

## 2022-07-12 RX ADMIN — INSULIN DETEMIR 6 UNITS: 100 INJECTION, SOLUTION SUBCUTANEOUS at 11:07

## 2022-07-12 RX ADMIN — DEXTROSE AND SODIUM CHLORIDE 125 ML/HR: 5; .45 INJECTION, SOLUTION INTRAVENOUS at 05:07

## 2022-07-12 RX ADMIN — INSULIN HUMAN 6 UNITS/HR: 1 INJECTION, SOLUTION INTRAVENOUS at 01:07

## 2022-07-12 RX ADMIN — ACETAMINOPHEN 650 MG: 325 TABLET ORAL at 02:07

## 2022-07-12 RX ADMIN — ASPIRIN 325 MG ORAL TABLET 325 MG: 325 PILL ORAL at 10:07

## 2022-07-12 RX ADMIN — ACETAMINOPHEN 650 MG: 325 TABLET ORAL at 09:07

## 2022-07-12 RX ADMIN — NITROGLYCERIN 0.4 MG: 0.4 TABLET SUBLINGUAL at 10:07

## 2022-07-12 RX ADMIN — INSULIN HUMAN 10 UNITS: 100 INJECTION, SOLUTION PARENTERAL at 11:07

## 2022-07-12 RX ADMIN — CLOPIDOGREL 75 MG: 75 TABLET, FILM COATED ORAL at 06:07

## 2022-07-12 NOTE — CONSULTS
[facility] - Emergency Dept.  Cardiology  Consult Note    Patient Name: Avril Monzon  MRN: [id number]  Admission Date: 7/12/2022  Hospital Length of Stay: 0 days  Code Status: Prior   Attending Provider: Suhas Prakash, *   Consulting Provider: Abhi Sloan MD  Primary Care Physician: Rose Parks MD  Principal Problem:<principal problem not specified>    Patient information was obtained from patient and ER records.     Inpatient consult to Cardiology  Consult performed by: Abhi Sloan MD  Consult ordered by: Sebastien Khoury MD        Subjective:     Chief Complaint:  CP/SOB     HPI:   77 y.o. female patient with a PMHx of CKD, CAD s/p cardiac stenting, DM2, heart failure, HLD, HTN, NSTEMI, seizure, tobacco dependence, and CVA presents with NSTEMI and CHF.  EKG NSR, ST depression multiple leads. Initial troponin is 12. Pt being dialyzed today.       Past Medical History:   Diagnosis Date    Acute hypoxemic respiratory failure 11/26/2018    Anemia     Arthritis     back, hand, knees    Back pain     Cataract     CKD stage G4/A3, GFR 15 - 29 and albumin creatinine ratio >300 mg/g     GFR 40% Jan 2014 and 33% ub 3/2014 (BRG)    Coronary artery disease involving native coronary artery of native heart 11/30/2018    Diabetic retinopathy     DM (diabetes mellitus) type II controlled, neurological manifestation     Exudative age-related macular degeneration of left eye with active choroidal neovascularization 2/5/2019    GERD (gastroesophageal reflux disease)     Glaucoma     Heart failure     Hyperlipidemia     Hypertension     Non-ST elevation MI (NSTEMI) 11/28/2018    NSTEMI (non-ST elevated myocardial infarction) 11/27/2018    Peripheral neuropathy     Polyneuropathy     Proteinuria     >6 mo     Seizures     BRG 1/2014 -dick CT NRI showed small vessel    Stroke 2012,2014    Tobacco dependence     Type 2 diabetes with peripheral circulatory disorder, controlled        Past  Surgical History:   Procedure Laterality Date    BREAST LUMPECTOMY Left     benign, when patient was 15 years old    BREAST MASS EXCISION      ,benign, age 15.    CATHETERIZATION OF BOTH LEFT AND RIGHT HEART N/A 2018    Procedure: CATHETERIZATION, HEART, BOTH LEFT AND RIGHT;  Surgeon: Michelle Holman MD;  Location: [facility] CATH LAB;  Service: Cardiology;  Laterality: N/A;    CATHETERIZATION OF BOTH LEFT AND RIGHT HEART N/A 2018    Procedure: CATHETERIZATION, HEART, BOTH LEFT AND RIGHT;  Surgeon: Michelle Holman MD;  Location: [facility] CATH LAB;  Service: Cardiology;  Laterality: N/A;    HYSTERECTOMY  1982    INSERTION OF SHUNT      LEFT HEART CATHETERIZATION Left 12/3/2018    Procedure: CATHETERIZATION, HEART, LEFT;  Surgeon: Michelle Holman MD;  Location: [facility] CATH LAB;  Service: Cardiology;  Laterality: Left;  LAD ATHERECTOMY STENT/ FEMORAL APPROACH    OOPHORECTOMY      wrist cyst Right 1980s    dorsal wrist cyst       Review of patient's allergies indicates:   Allergen Reactions    Codeine Swelling    Darvon [propoxyphene] Swelling    Atorvastatin      Muscle twitching       No current facility-administered medications on file prior to encounter.     Current Outpatient Medications on File Prior to Encounter   Medication Sig    aspirin (ECOTRIN) 81 MG EC tablet Take 1 tablet (81 mg total) by mouth once daily.    azelastine (ASTELIN) 137 mcg (0.1 %) nasal spray SMARTSI Spray(s) Both Nares Twice Daily PRN    benzonatate (TESSALON) 200 MG capsule Take 200 mg by mouth 3 (three) times daily.    clopidogreL (PLAVIX) 75 mg tablet Take 1 tablet (75 mg total) by mouth once daily.    insulin NPH/Reg human (HUMULIN, 70/30,) 100 unit/mL (70-30) InPn pen To do 28 units subq in AM and 8 units subq in PM.  Dose to be done within 15 min before or after meal. (Patient taking differently: To do 28 units subq in AM and 8 units subq in PM.  Dose to be done within 15 min before or after meal.)     isosorbide-hydrALAZINE 20-37.5 mg (BIDIL) 20-37.5 mg Tab Take 1 tablet by mouth 3 (three) times daily.    metoprolol tartrate (LOPRESSOR) 50 MG tablet Take 1 tablet (50 mg total) by mouth 2 (two) times daily.    semaglutide (OZEMPIC) 0.25 mg or 0.5 mg(2 mg/1.5 mL) pen injector Inject 0.25 mg into the skin every 7 days.    telmisartan (MICARDIS) 20 MG Tab Take 1 tablet (20 mg total) by mouth once daily.    [DISCONTINUED] blood-glucose meter kit To check BG 2 times daily, to use with insurance preferred meter    ACCU-CHEK ARDEN PLUS METER Mis USE TO TEST TWICE DAILY    blood sugar diagnostic Strp To check BG 2 times daily, to use with insurance preferred meter    cyanocobalamin (VITAMIN B-12) 1000 MCG tablet Take 100 mcg by mouth once daily.    folic acid (FOLVITE) 1 MG tablet Take 1 mg by mouth once daily.    lancets Misc To check BG 2 times daily, to use with insurance preferred meter    triamcinolone acetonide 0.1% (KENALOG) 0.1 % cream Apply topically 2 (two) times daily.    [DISCONTINUED] blood sugar diagnostic Strp 1 strip by Misc.(Non-Drug; Combo Route) route 3 (three) times daily. relion ultima    [DISCONTINUED] erythromycin (ROMYCIN) ophthalmic ointment PLACE A SMALL AMOUNT INTO LEFT EYE THREE TIMES DAILY AS DIRECTED    [DISCONTINUED] hydrOXYzine HCL (ATARAX) 25 MG tablet Take 1 tablet (25 mg total) by mouth 3 (three) times daily as needed for Itching. (Patient not taking: Reported on 7/5/2022)    [DISCONTINUED] lancets (ACCU-CHEK SOFTCLIX LANCETS) Misc 1 lancet by Misc.(Non-Drug; Combo Route) route 3 (three) times daily.    [DISCONTINUED] LEXISCAN 0.4 mg/5 mL Syrg     [DISCONTINUED] sodium,potassium,mag sulfates (SUPREP BOWEL PREP KIT) 17.5-3.13-1.6 gram SolR Take as directed     Family History       Problem Relation (Age of Onset)    Cancer Maternal Grandmother    Diabetes Mother    Heart disease Mother    Hyperlipidemia Mother    Hypertension Mother    Muscular dystrophy Son           Tobacco Use    Smoking status: Former Smoker     Packs/day: 1.50     Years: 30.00     Pack years: 45.00     Types: Cigarettes     Quit date: 1990     Years since quittin.5    Smokeless tobacco: Former User   Substance and Sexual Activity    Alcohol use: No     Alcohol/week: 0.0 standard drinks    Drug use: No    Sexual activity: Not Currently     ROS  Objective:     Vital Signs (Most Recent):  Temp: 98.2 °F (36.8 °C) (22 1045)  Pulse: 78 (22 1530)  Resp: (Abnormal) 23 (22 1530)  BP: 117/60 (22 1530)  SpO2: 100 % (22 1530)   Vital Signs (24h Range):  Temp:  [98 °F (36.7 °C)-98.2 °F (36.8 °C)] 98.2 °F (36.8 °C)  Pulse:  [72-99] 78  Resp:  [16-28] 23  SpO2:  [94 %-100 %] 100 %  BP: (106-171)/(56-83) 117/60     Weight: 61.7 kg (136 lb)  Body mass index is 22.63 kg/m².    SpO2: 100 %  O2 Device (Oxygen Therapy): nasal cannula    No intake or output data in the 24 hours ending 22 1623    Lines/Drains/Airways       Drain       Name Duration         Hemodialysis AV Fistula 20 2203 Left upper arm 831 days              Peripheral Intravenous Line       Name Duration         Peripheral IV - Single Lumen 20 G Right Hand No Data         Peripheral IV - Single Lumen 22 1046 20 G Right Forearm <1 day                    Physical Exam    Significant Labs: All pertinent lab results from the last 24 hours have been reviewed.    Significant Imaging: X-Ray: CXR: X-Ray Chest 1 View (CXR): No results found for this visit on 22.    Assessment and Plan:     Elevated troponin  77 y.o. female patient with a PMHx of CKD, CAD s/p cardiac stenting, DM2, heart failure, HLD, HTN, NSTEMI, seizure, tobacco dependence, and CVA presents with NSTEMI and CHF.  EKG NSR, ST depression multiple leads. Initial troponin is 12. Pt being dialyzed today.    Continue IV heparin, cardiac cath once CHF stable, being dialyzed today        VTE Risk Mitigation (From admission, onward)          Ordered     heparin 25,000 units in dextrose 5% (100 units/ml) IV bolus from bag - ADDITIONAL PRN BOLUS - 60 units/kg (max bolus 4000 units)  As needed (PRN)        Question:  Heparin Infusion Adjustment (DO NOT MODIFY ANSWER)  Answer:  \\[email or web address]\epic\Images\Pharmacy\HeparinInfusions\heparin LOW INTENSITY nomogram for [facility] [id number].pdf    07/12/22 1138     heparin 25,000 units in dextrose 5% (100 units/ml) IV bolus from bag - ADDITIONAL PRN BOLUS - 30 units/kg (max bolus 4000 units)  As needed (PRN)        Question:  Heparin Infusion Adjustment (DO NOT MODIFY ANSWER)  Answer:  \\[email or web address]\epic\Images\Pharmacy\HeparinInfusions\heparin LOW INTENSITY nomogram for [facility] [id number].pdf    07/12/22 1138     IP VTE HIGH RISK PATIENT  Once         07/12/22 1216     Place sequential compression device  Until discontinued         07/12/22 1216     heparin 25,000 units in dextrose 5% 250 mL (100 units/mL) infusion LOW INTENSITY nomogram - [facility]  Continuous        Question Answer Comment   Heparin Infusion Adjustment (DO NOT MODIFY ANSWER) \\[email or web address]\epic\Images\Pharmacy\HeparinInfusions\heparin LOW INTENSITY nomogram for [facility] [id number].pdf    Begin at (in units/kg/hr) 12        07/12/22 1138                Thank you for your consult. I will follow-up with patient. Please contact us if you have any additional questions.    Abhi Sloan MD  Cardiology   [facility] - Emergency Dept.

## 2022-07-12 NOTE — H&P
[facility] - Emergency Dept.  [facility] Medicine  History & Physical    Patient Name: Avril Monzon  MRN: [id number]  Patient Class: IP- Inpatient  Admission Date: 7/12/2022  Attending Physician: Suhas Prakash, *   Primary Care Provider: Rose Parks MD         Patient information was obtained from patient and ER records.     Subjective:     Principal Problem:NSTEMI (non-ST elevated myocardial infarction)    Chief Complaint:   Chief Complaint   Patient presents with    Chest Pain     Seen at urgent care yesterday; c/o sharp pain with shortness of breath; hx of MI three years ago        HPI:   75 y/o female with a pmh of ESRD on HD, DM2, hyperlipidemia,CAD , Tobacco abuse , HTN   and h/o CVA who was brought to ED by family today due to chest pain  for the last 4 days which has gotten worse .  It is located  in the midsternum , intermittent ,  Pressure like pain ,  no radiation , rated 6/10  associated sx son  and nausea . She was in her normal health of states before Friday . She denies  any  fever , chills , diarrhea , Cough , HA  or  sx . She report missed HD yesterday due to  not feeling well . She went to Urgent care  yesterday , was dx with sinus infection  and d/c home .  ER COURSE:  K 6.1 , Co2 12 , Anion gap 23 , BUN/Cr 78/7.1 ,  , Troponin 12 , BHB 1.8 . CXR show pulmonary congestion . ABG show a PH 7.297   Cardiology and Nephrology consulted from the  ER . Started on insulin and heparin drip   ER VS:   BP Pulse Resp Temp SpO2   106/76 72 16 98 °F (36.7 °C) 96 %   CODE Status : Full code  Pt will be admitted to inpt with a dx of NSTEMI and DKA      Past Medical History:   Diagnosis Date    Acute hypoxemic respiratory failure 11/26/2018    Anemia     Arthritis     back, hand, knees    Back pain     Cataract     CKD stage G4/A3, GFR 15 - 29 and albumin creatinine ratio >300 mg/g     GFR 40% Jan 2014 and 33% ub 3/2014 ([facility])    Coronary artery disease involving native coronary artery of  native heart 11/30/2018    Diabetic retinopathy     DM (diabetes mellitus) type II controlled, neurological manifestation     Exudative age-related macular degeneration of left eye with active choroidal neovascularization 2/5/2019    GERD (gastroesophageal reflux disease)     Glaucoma     Heart failure     Hyperlipidemia     Hypertension     Non-ST elevation MI (NSTEMI) 11/28/2018    NSTEMI (non-ST elevated myocardial infarction) 11/27/2018    Peripheral neuropathy     Polyneuropathy     Proteinuria     >6 mo     Seizures     BRG 1/2014 -dick CT NRI showed small vessel    Stroke 2012,2014    Tobacco dependence     Type 2 diabetes with peripheral circulatory disorder, controlled        Past Surgical History:   Procedure Laterality Date    BREAST LUMPECTOMY Left     benign, when patient was 15 years old    BREAST MASS EXCISION      ,benign, age 15.    CATHETERIZATION OF BOTH LEFT AND RIGHT HEART N/A 11/28/2018    Procedure: CATHETERIZATION, HEART, BOTH LEFT AND RIGHT;  Surgeon: Michelle Holman MD;  Location: [facility] CATH LAB;  Service: Cardiology;  Laterality: N/A;    CATHETERIZATION OF BOTH LEFT AND RIGHT HEART N/A 11/30/2018    Procedure: CATHETERIZATION, HEART, BOTH LEFT AND RIGHT;  Surgeon: Michelle Holman MD;  Location: [facility] CATH LAB;  Service: Cardiology;  Laterality: N/A;    HYSTERECTOMY  1982    INSERTION OF SHUNT      LEFT HEART CATHETERIZATION Left 12/3/2018    Procedure: CATHETERIZATION, HEART, LEFT;  Surgeon: Michelle Holman MD;  Location: [facility] CATH LAB;  Service: Cardiology;  Laterality: Left;  LAD ATHERECTOMY STENT/ FEMORAL APPROACH    OOPHORECTOMY      wrist cyst Right 1980s    dorsal wrist cyst       Review of patient's allergies indicates:   Allergen Reactions    Codeine Swelling    Darvon [propoxyphene] Swelling    Atorvastatin      Muscle twitching       No current facility-administered medications on file prior to encounter.     Current Outpatient Medications on File Prior  to Encounter   Medication Sig    aspirin (ECOTRIN) 81 MG EC tablet Take 1 tablet (81 mg total) by mouth once daily.    azelastine (ASTELIN) 137 mcg (0.1 %) nasal spray SMARTSI Spray(s) Both Nares Twice Daily PRN    benzonatate (TESSALON) 200 MG capsule Take 200 mg by mouth 3 (three) times daily.    clopidogreL (PLAVIX) 75 mg tablet Take 1 tablet (75 mg total) by mouth once daily.    insulin NPH/Reg human (HUMULIN, 70/30,) 100 unit/mL (70-30) InPn pen To do 28 units subq in AM and 8 units subq in PM.  Dose to be done within 15 min before or after meal. (Patient taking differently: To do 28 units subq in AM and 8 units subq in PM.  Dose to be done within 15 min before or after meal.)    isosorbide-hydrALAZINE 20-37.5 mg (BIDIL) 20-37.5 mg Tab Take 1 tablet by mouth 3 (three) times daily.    metoprolol tartrate (LOPRESSOR) 50 MG tablet Take 1 tablet (50 mg total) by mouth 2 (two) times daily.    semaglutide (OZEMPIC) 0.25 mg or 0.5 mg(2 mg/1.5 mL) pen injector Inject 0.25 mg into the skin every 7 days.    telmisartan (MICARDIS) 20 MG Tab Take 1 tablet (20 mg total) by mouth once daily.    [DISCONTINUED] blood-glucose meter kit To check BG 2 times daily, to use with insurance preferred meter    ACCU-CHEK ARDEN PLUS METER [facility] USE TO TEST TWICE DAILY    blood sugar diagnostic Strp To check BG 2 times daily, to use with insurance preferred meter    cyanocobalamin (VITAMIN B-12) 1000 MCG tablet Take 100 mcg by mouth once daily.    folic acid (FOLVITE) 1 MG tablet Take 1 mg by mouth once daily.    lancets [facility] To check BG 2 times daily, to use with insurance preferred meter    triamcinolone acetonide 0.1% (KENALOG) 0.1 % cream Apply topically 2 (two) times daily.    [DISCONTINUED] blood sugar diagnostic Strp 1 strip by Misc.(Non-Drug; Combo Route) route 3 (three) times daily. relion ultima    [DISCONTINUED] erythromycin (ROMYCIN) ophthalmic ointment PLACE A SMALL AMOUNT INTO LEFT EYE THREE TIMES DAILY  AS DIRECTED    [DISCONTINUED] hydrOXYzine HCL (ATARAX) 25 MG tablet Take 1 tablet (25 mg total) by mouth 3 (three) times daily as needed for Itching. (Patient not taking: Reported on 2022)    [DISCONTINUED] lancets (ACCU-CHEK SOFTCLIX LANCETS) Misc 1 lancet by Misc.(Non-Drug; Combo Route) route 3 (three) times daily.    [DISCONTINUED] LEXISCAN 0.4 mg/5 mL Syrg     [DISCONTINUED] sodium,potassium,mag sulfates (SUPREP BOWEL PREP KIT) 17.5-3.13-1.6 gram SolR Take as directed     Family History       Problem Relation (Age of Onset)    Cancer Maternal Grandmother    Diabetes Mother    Heart disease Mother    Hyperlipidemia Mother    Hypertension Mother    Muscular dystrophy Son          Tobacco Use    Smoking status: Former Smoker     Packs/day: 1.50     Years: 30.00     Pack years: 45.00     Types: Cigarettes     Quit date: 1990     Years since quittin.5    Smokeless tobacco: Former User   Substance and Sexual Activity    Alcohol use: No     Alcohol/week: 0.0 standard drinks    Drug use: No    Sexual activity: Not Currently     Review of Systems   Constitutional:  Positive for activity change and fatigue.   HENT: Negative.     Eyes: Negative.    Respiratory:  Positive for shortness of breath.    Cardiovascular:  Positive for chest pain.   Gastrointestinal:  Positive for nausea and vomiting.   Endocrine: Negative.    Genitourinary: Negative.    Musculoskeletal: Negative.    Skin: Negative.    Allergic/Immunologic: Negative.    Neurological: Negative.    Hematological: Negative.    Psychiatric/Behavioral: Negative.     Objective:     Vital Signs (Most Recent):  Temp: 98.2 °F (36.8 °C) (22 1045)  Pulse: 73 (22 1630)  Resp: (!) 26 (22 1630)  BP: 131/70 (22 1630)  SpO2: 100 % (22 1630)   Vital Signs (24h Range):  Temp:  [98 °F (36.7 °C)-98.2 °F (36.8 °C)] 98.2 °F (36.8 °C)  Pulse:  [72-99] 73  Resp:  [16-28] 26  SpO2:  [94 %-100 %] 100 %  BP: (106-171)/(56-83) 131/70      Weight: 61.7 kg (136 lb)  Body mass index is 22.63 kg/m².    Physical Exam  Vitals and nursing note reviewed.   Constitutional:       Appearance: She is ill-appearing.   HENT:      Head: Normocephalic and atraumatic.      Nose: Nose normal.      Mouth/Throat:      Mouth: Mucous membranes are moist.   Eyes:      Extraocular Movements: Extraocular movements intact.      Conjunctiva/sclera: Conjunctivae normal.      Pupils: Pupils are equal, round, and reactive to light.   Cardiovascular:      Rate and Rhythm: Normal rate and regular rhythm.      Pulses: Normal pulses.      Heart sounds: No murmur heard.    No friction rub.   Pulmonary:      Effort: Pulmonary effort is normal. No respiratory distress.      Breath sounds: No stridor. No wheezing, rhonchi or rales.   Abdominal:      General: Abdomen is flat. Bowel sounds are normal. There is no distension.      Palpations: There is no mass.      Tenderness: There is no abdominal tenderness. There is no guarding or rebound.      Hernia: No hernia is present.   Musculoskeletal:         General: No swelling, tenderness, deformity or signs of injury. Normal range of motion.      Cervical back: Normal range of motion and neck supple. No rigidity or tenderness.   Skin:     General: Skin is warm.      Capillary Refill: Capillary refill takes less than 2 seconds.      Coloration: Skin is not jaundiced.      Findings: No bruising.   Neurological:      General: No focal deficit present.      Mental Status: She is alert.      Cranial Nerves: No cranial nerve deficit.      Sensory: No sensory deficit.      Motor: No weakness.      Coordination: Coordination normal.   Psychiatric:         Mood and Affect: Mood normal.         CRANIAL NERVES     CN III, IV, VI   Pupils are equal, round, and reactive to light.     Significant Labs: All pertinent labs within the past 24 hours have been reviewed.      Significant Imaging: I have reviewed all pertinent imaging results/findings within  the past 24 hours.      Assessment/Plan:     * NSTEMI (non-ST elevated myocardial infarction)  Cardiology consulted  Cont Heparin drip , asa , plavix and BB  F/U TTE  Trend troponin level       Metabolic acidosis  Cont HD  Cont insulin drip       Hyperkalemia  Cont HD      DKA (diabetic ketoacidosis)  Start DKA protocol   Cont insulin drip   Monitor  Electrolytes and CBG        ESRD (end stage renal disease)    Nephrology consulted   HD per nephrology     Hypertension associated with diabetes  Resume BB          VTE Risk Mitigation (From admission, onward)         Ordered     IP VTE HIGH RISK PATIENT  Once         07/12/22 1637     Place DYANA hose  Until discontinued         07/12/22 1637     Place sequential compression device  Until discontinued         07/12/22 1637     heparin 25,000 units in dextrose 5% (100 units/ml) IV bolus from bag - ADDITIONAL PRN BOLUS - 60 units/kg (max bolus 4000 units)  As needed (PRN)        Question:  Heparin Infusion Adjustment (DO NOT MODIFY ANSWER)  Answer:  \[email or web address]\epic\Images\Pharmacy\HeparinInfusions\heparin LOW INTENSITY nomogram for [facility] [id number].pdf    07/12/22 1138     heparin 25,000 units in dextrose 5% (100 units/ml) IV bolus from bag - ADDITIONAL PRN BOLUS - 30 units/kg (max bolus 4000 units)  As needed (PRN)        Question:  Heparin Infusion Adjustment (DO NOT MODIFY ANSWER)  Answer:  \[email or web address]\epic\Images\Pharmacy\HeparinInfusions\heparin LOW INTENSITY nomogram for [facility] [id number].pdf    07/12/22 1138     Place sequential compression device  Until discontinued         07/12/22 1216     heparin 25,000 units in dextrose 5% 250 mL (100 units/mL) infusion LOW INTENSITY nomogram - [facility]  Continuous        Question Answer Comment   Heparin Infusion Adjustment (DO NOT MODIFY ANSWER) \\[email or web address]\epic\Images\Pharmacy\HeparinInfusions\heparin LOW INTENSITY nomogram for [facility] [id number].pdf    Begin at (in units/kg/hr) 12        07/12/22 1138               critical care time 65  angeli Prakash MD  Department of Hospital Medicine   [facility] - Emergency Dept.

## 2022-07-12 NOTE — Clinical Note
The DP pulses were 1+ bilaterally. The PT pulses were 1+ bilaterally. The radial pulses were +1 bilaterally.

## 2022-07-12 NOTE — CONSULTS
[facility] - Emergency Dept.  Nephrology  Consult Note    Patient Name: Avril Monzon  MRN: [id number]  Admission Date: 7/12/2022  Hospital Length of Stay: 0 days  Attending Provider: Suhas Prakash, *   Primary Care Physician: Rose Parks MD  Principal Problem:<principal problem not specified>    Inpatient consult to Nephrology  Consult performed by: Adrian Ramirez MD  Consult ordered by: Suhas Prakash MD  Reason for consult: End-stage renal disease        Subjective:     HPI:     77-year-old female with history of end-stage renal disease.  Presented to the emergency department with chest pain and shortness of breath.  She did not have dialysis yesterday due to not feeling well.  Nephrology has been consulted for dialysis maintenance.  Patient was seen in the emergency department.  In bed resting comfortably.  No acute distress noted.  States that she is feeling better since she arrived in the emergency department.  She still has some shortness of breath however.  Her chest discomfort has improved.    Past Medical History:   Diagnosis Date    Acute hypoxemic respiratory failure 11/26/2018    Anemia     Arthritis     back, hand, knees    Back pain     Cataract     CKD stage G4/A3, GFR 15 - 29 and albumin creatinine ratio >300 mg/g     GFR 40% Jan 2014 and 33% ub 3/2014 ([facility])    Coronary artery disease involving native coronary artery of native heart 11/30/2018    Diabetic retinopathy     DM (diabetes mellitus) type II controlled, neurological manifestation     Exudative age-related macular degeneration of left eye with active choroidal neovascularization 2/5/2019    GERD (gastroesophageal reflux disease)     Glaucoma     Heart failure     Hyperlipidemia     Hypertension     Non-ST elevation MI (NSTEMI) 11/28/2018    NSTEMI (non-ST elevated myocardial infarction) 11/27/2018    Peripheral neuropathy     Polyneuropathy     Proteinuria     >6 mo     Seizures     [facility] 1/2014  -dick CT NRI showed small vessel    Stroke 2012,2014    Tobacco dependence     Type 2 diabetes with peripheral circulatory disorder, controlled        Past Surgical History:   Procedure Laterality Date    BREAST LUMPECTOMY Left     benign, when patient was 15 years old    BREAST MASS EXCISION      ,benign, age 15.    CATHETERIZATION OF BOTH LEFT AND RIGHT HEART N/A 11/28/2018    Procedure: CATHETERIZATION, HEART, BOTH LEFT AND RIGHT;  Surgeon: Michelle Holman MD;  Location: [facility] CATH LAB;  Service: Cardiology;  Laterality: N/A;    CATHETERIZATION OF BOTH LEFT AND RIGHT HEART N/A 11/30/2018    Procedure: CATHETERIZATION, HEART, BOTH LEFT AND RIGHT;  Surgeon: Michelle Holman MD;  Location: [facility] CATH LAB;  Service: Cardiology;  Laterality: N/A;    HYSTERECTOMY  1982    INSERTION OF SHUNT      LEFT HEART CATHETERIZATION Left 12/3/2018    Procedure: CATHETERIZATION, HEART, LEFT;  Surgeon: Michelle Holman MD;  Location: [facility] CATH LAB;  Service: Cardiology;  Laterality: Left;  LAD ATHERECTOMY STENT/ FEMORAL APPROACH    OOPHORECTOMY      wrist cyst Right 1980s    dorsal wrist cyst       Review of patient's allergies indicates:   Allergen Reactions    Codeine Swelling    Darvon [propoxyphene] Swelling    Atorvastatin      Muscle twitching     Current Facility-Administered Medications   Medication Frequency    dextrose 10 % infusion PRN    dextrose 10 % infusion PRN    heparin 25,000 units in dextrose 5% (100 units/ml) IV bolus from bag - ADDITIONAL PRN BOLUS - 30 units/kg (max bolus 4000 units) PRN    heparin 25,000 units in dextrose 5% (100 units/ml) IV bolus from bag - ADDITIONAL PRN BOLUS - 60 units/kg (max bolus 4000 units) PRN    heparin 25,000 units in dextrose 5% 250 mL (100 units/mL) infusion LOW INTENSITY nomogram - [facility] Continuous    insulin regular in 0.9 % NaCl 100 unit/100 mL (1 unit/mL) infusion Continuous    nitroGLYCERIN SL tablet 0.4 mg Q5 Min PRN     Current Outpatient Medications    Medication    aspirin (ECOTRIN) 81 MG EC tablet    azelastine (ASTELIN) 137 mcg (0.1 %) nasal spray    benzonatate (TESSALON) 200 MG capsule    clopidogreL (PLAVIX) 75 mg tablet    insulin NPH/Reg human (HUMULIN, 70/30,) 100 unit/mL (70-30) InPn pen    isosorbide-hydrALAZINE 20-37.5 mg (BIDIL) 20-37.5 mg Tab    metoprolol tartrate (LOPRESSOR) 50 MG tablet    semaglutide (OZEMPIC) 0.25 mg or 0.5 mg(2 mg/1.5 mL) pen injector    telmisartan (MICARDIS) 20 MG Tab    ACCU-CHEK ARDEN PLUS METER Mis    blood sugar diagnostic Strp    cyanocobalamin (VITAMIN B-12) 1000 MCG tablet    folic acid (FOLVITE) 1 MG tablet    lancets Misc    triamcinolone acetonide 0.1% (KENALOG) 0.1 % cream     Family History     Problem Relation (Age of Onset)    Cancer Maternal Grandmother    Diabetes Mother    Heart disease Mother    Hyperlipidemia Mother    Hypertension Mother    Muscular dystrophy Son        Tobacco Use    Smoking status: Former Smoker     Packs/day: 1.50     Years: 30.00     Pack years: 45.00     Types: Cigarettes     Quit date: 1990     Years since quittin.5    Smokeless tobacco: Former User   Substance and Sexual Activity    Alcohol use: No     Alcohol/week: 0.0 standard drinks    Drug use: No    Sexual activity: Not Currently     Review of Systems   Constitutional: Negative.    HENT: Negative.    Eyes: Negative.    Respiratory: Positive for shortness of breath.    Cardiovascular: Positive for chest pain.   Gastrointestinal: Negative.    Genitourinary: Negative.    Musculoskeletal: Negative.    Skin: Negative.    Neurological: Negative.      Objective:     Vital Signs (Most Recent):  Temp: 98.2 °F (36.8 °C) (22 1045)  Pulse: 78 (22 1530)  Resp: (!) 23 (22 1530)  BP: 117/60 (22 1530)  SpO2: 100 % (22 1530)  O2 Device (Oxygen Therapy): nasal cannula (22 1230) Vital Signs (24h Range):  Temp:  [98 °F (36.7 °C)-98.2 °F (36.8 °C)] 98.2 °F (36.8 °C)  Pulse:   [72-99] 78  Resp:  [16-28] 23  SpO2:  [94 %-100 %] 100 %  BP: (106-171)/(56-83) 117/60     Weight: 61.7 kg (136 lb) (07/12/22 1400)  Body mass index is 22.63 kg/m².  Body surface area is 1.68 meters squared.    No intake/output data recorded.    Physical Exam  Constitutional:       Appearance: Normal appearance.   HENT:      Head: Normocephalic and atraumatic.   Eyes:      General: No scleral icterus.     Extraocular Movements: Extraocular movements intact.      Pupils: Pupils are equal, round, and reactive to light.   Cardiovascular:      Rate and Rhythm: Normal rate and regular rhythm.   Pulmonary:      Effort: Pulmonary effort is normal.      Breath sounds: Rales present.   Abdominal:      General: Abdomen is flat.      Palpations: There is no mass.   Musculoskeletal:      Right lower leg: No edema.      Left lower leg: No edema.   Skin:     General: Skin is warm and dry.   Neurological:      General: No focal deficit present.      Mental Status: She is alert and oriented to person, place, and time.   Psychiatric:         Mood and Affect: Mood normal.         Behavior: Behavior normal.         Significant Labs:  BMP:   Recent Labs   Lab 07/12/22  1048   *      K 6.1*      CO2 12*   BUN 78*   CREATININE 7.1*   CALCIUM 9.2     CMP:   Recent Labs   Lab 07/12/22  1048   *   CALCIUM 9.2   ALBUMIN 3.5   PROT 7.3      K 6.1*   CO2 12*      BUN 78*   CREATININE 7.1*   ALKPHOS 115   ALT 90*   *   BILITOT 0.6     All labs within the past 24 hours have been reviewed.    Significant Imaging:  Labs: Reviewed  Reviewed    Assessment/Plan:     There are no hospital problems to display for this patient.      1. Shortness of breath:  Chest x-ray has evidence of pulmonary edema.  Will plan dialysis today urgently with ultrafiltration.  Discussed with dialysis staff, orders have been given.    2. Chest pain:  She has elevated troponins.  Cardiology has been consulted.    3. Hyperkalemia:   Should improve with dialysis.    4. Metabolic acidosis:  Consistent with end-stage renal disease.  Will improve with dialysis.    Thank you for your consult.     Adrian Ramirez MD  Nephrology  [facility] - Emergency Dept.

## 2022-07-12 NOTE — ASSESSMENT & PLAN NOTE
- mild improvement in metabolic acidosis with   - cont to monitor serum CO2  - HD per nephrology mgmt

## 2022-07-12 NOTE — HPI
77 y/o female with a pmh of ESRD on HD, DM2, hyperlipidemia,CAD , Tobacco abuse , HTN   and h/o CVA who was brought to ED by family today due to chest pain  for the last 4 days which has gotten worse .  It is located  in the midsternum , intermittent ,  Pressure like pain ,  no radiation , rated 6/10  associated sx son  and nausea . She was in her normal health of states before Friday . She denies  any  fever , chills , diarrhea , Cough , HA  or  sx . She report missed HD yesterday due to  not feeling well . She went to Urgent care  yesterday , was dx with sinus infection  and d/c home .  ER COURSE:  K 6.1 , Co2 12 , Anion gap 23 , BUN/Cr 78/7.1 ,  , Troponin 12 , BHB 1.8 . CXR show pulmonary congestion . ABG show a PH 7.297   Cardiology and Nephrology consulted from the  ER . Started on insulin and heparin drip   ER VS:   BP Pulse Resp Temp SpO2   106/76 72 16 98 °F (36.7 °C) 96 %   CODE Status : Full code  Pt will be admitted to inpt with a dx of NSTEMI and DKA

## 2022-07-12 NOTE — CONSULTS
[facility] - Emergency Dept.  Critical Care Medicine  Consult Note    Patient Name: Avril Monzon  MRN: [id number]  Admission Date: 7/12/2022  Hospital Length of Stay: 0 days  Code Status: Full Code  Attending Physician: Suhas Prakash, *   Primary Care Provider: Rose Parks MD   Principal Problem: NSTEMI (non-ST elevated myocardial infarction)    Inpatient consult to Critical Care Medicine  Consult performed by: Gordon Paniagua MD  Consult ordered by: Suhas Prakash MD        Subjective:     HPI:  77W pmh ESRD, DM, CAD s/p stent, HFpEF (Gr2 DD), HLD, HTN, NSTEMI, seizure, tobacco dependence, and CVA presents with NSTEMI .  Her hospital course has been complicated by DKA.    In ED, EKG with multiple leads with ST depressions.  Initial Troponin approx 12.  She is undergoing HD for volume mgmt.   She is on the DKA protocol with insulin drip, fluids and serial electrolytes.      Hospital/ICU Course:  7/12: Aspirin.  Heparin drip for NSTEMI.  Insulin drip for DKA.  Undergoing HD.      Past Medical History:   Diagnosis Date    Acute hypoxemic respiratory failure 11/26/2018    Anemia     Arthritis     back, hand, knees    Back pain     Cataract     CKD stage G4/A3, GFR 15 - 29 and albumin creatinine ratio >300 mg/g     GFR 40% Jan 2014 and 33% ub 3/2014 ([facility])    Coronary artery disease involving native coronary artery of native heart 11/30/2018    Diabetic retinopathy     DM (diabetes mellitus) type II controlled, neurological manifestation     Exudative age-related macular degeneration of left eye with active choroidal neovascularization 2/5/2019    GERD (gastroesophageal reflux disease)     Glaucoma     Heart failure     Hyperlipidemia     Hypertension     Non-ST elevation MI (NSTEMI) 11/28/2018    NSTEMI (non-ST elevated myocardial infarction) 11/27/2018    Peripheral neuropathy     Polyneuropathy     Proteinuria     >6 mo     Seizures     BRG 1/2014 -dick CT NRI showed small  vessel    Stroke ,    Tobacco dependence     Type 2 diabetes with peripheral circulatory disorder, controlled        Past Surgical History:   Procedure Laterality Date    BREAST LUMPECTOMY Left     benign, when patient was 15 years old    BREAST MASS EXCISION      ,benign, age 15.    CATHETERIZATION OF BOTH LEFT AND RIGHT HEART N/A 2018    Procedure: CATHETERIZATION, HEART, BOTH LEFT AND RIGHT;  Surgeon: Michelle Holman MD;  Location: [facility] CATH LAB;  Service: Cardiology;  Laterality: N/A;    CATHETERIZATION OF BOTH LEFT AND RIGHT HEART N/A 2018    Procedure: CATHETERIZATION, HEART, BOTH LEFT AND RIGHT;  Surgeon: Michelle Holman MD;  Location: [facility] CATH LAB;  Service: Cardiology;  Laterality: N/A;    HYSTERECTOMY  1982    INSERTION OF SHUNT      LEFT HEART CATHETERIZATION Left 12/3/2018    Procedure: CATHETERIZATION, HEART, LEFT;  Surgeon: Michelle Holman MD;  Location: [facility] CATH LAB;  Service: Cardiology;  Laterality: Left;  LAD ATHERECTOMY STENT/ FEMORAL APPROACH    OOPHORECTOMY      wrist cyst Right 1980s    dorsal wrist cyst       Review of patient's allergies indicates:   Allergen Reactions    Codeine Swelling    Darvon [propoxyphene] Swelling    Atorvastatin      Muscle twitching       Family History       Problem Relation (Age of Onset)    Cancer Maternal Grandmother    Diabetes Mother    Heart disease Mother    Hyperlipidemia Mother    Hypertension Mother    Muscular dystrophy Son          Tobacco Use    Smoking status: Former Smoker     Packs/day: 1.50     Years: 30.00     Pack years: 45.00     Types: Cigarettes     Quit date: 1990     Years since quittin.5    Smokeless tobacco: Former User   Substance and Sexual Activity    Alcohol use: No     Alcohol/week: 0.0 standard drinks    Drug use: No    Sexual activity: Not Currently         Review of Systems   Constitutional:  Negative for chills, fatigue and fever.   Respiratory:  Negative for cough, shortness of  breath and wheezing.    Gastrointestinal:  Negative for abdominal pain, diarrhea, nausea and vomiting.   Neurological:  Negative for seizures, weakness and headaches.   All other systems reviewed and are negative.  Objective:     Vital Signs (Most Recent):  Temp: 98.2 °F (36.8 °C) (07/12/22 1045)  Pulse: 73 (07/12/22 1630)  Resp: (!) 26 (07/12/22 1630)  BP: 131/70 (07/12/22 1630)  SpO2: 100 % (07/12/22 1630)   Vital Signs (24h Range):  Temp:  [98 °F (36.7 °C)-98.2 °F (36.8 °C)] 98.2 °F (36.8 °C)  Pulse:  [72-99] 73  Resp:  [16-28] 26  SpO2:  [94 %-100 %] 100 %  BP: (106-171)/(56-83) 131/70     Weight: 61.7 kg (136 lb)  Body mass index is 22.63 kg/m².    No intake or output data in the 24 hours ending 07/12/22 1644    Physical Exam  Vitals reviewed.   Constitutional:       Appearance: Normal appearance.   HENT:      Head: Normocephalic and atraumatic.      Nose: Nose normal.   Eyes:      Extraocular Movements: Extraocular movements intact.      Pupils: Pupils are equal, round, and reactive to light.   Cardiovascular:      Rate and Rhythm: Normal rate and regular rhythm.   Pulmonary:      Effort: Pulmonary effort is normal. No respiratory distress.      Comments: Symmetric chest rise.  Abdominal:      General: Abdomen is flat. There is no distension.      Palpations: Abdomen is soft.   Musculoskeletal:         General: No deformity or signs of injury.      Right lower leg: No edema.      Left lower leg: No edema.   Skin:     General: Skin is warm and dry.   Neurological:      General: No focal deficit present.      Mental Status: She is alert and oriented to person, place, and time.       Vents:       Lines/Drains/Airways       Drain  Duration                  Hemodialysis AV Fistula 04/01/20 2203 Left upper arm 831 days              Peripheral Intravenous Line  Duration                  Peripheral IV - Single Lumen 20 G Right Hand -- days         Peripheral IV - Single Lumen 07/12/22 1046 20 G Right Forearm <1 day                     Significant Labs:    CBC/Anemia Profile:  Recent Labs   Lab 07/12/22  1048   WBC 7.82   HGB 10.0*   HCT 31.6*      *   RDW 14.6*        Chemistries:  Recent Labs   Lab 07/12/22  1048      K 6.1*      CO2 12*   BUN 78*   CREATININE 7.1*   CALCIUM 9.2   ALBUMIN 3.5   PROT 7.3   BILITOT 0.6   ALKPHOS 115   ALT 90*   *       A1C: No results for input(s): HGBA1C in the last 48 hours.  ABGs:   Recent Labs   Lab 07/12/22  1217   PH 7.297*   PCO2 38.2   HCO3 18.6*   POCSATURATED 50*   BE -8     Coagulation:   Recent Labs   Lab 07/12/22  1048   INR 1.0   APTT 24.4     Lactic Acid: No results for input(s): LACTATE in the last 48 hours.  Troponin:   Recent Labs   Lab 07/12/22  1048   TROPONINI 12.581*     Urine Studies: No results for input(s): COLORU, APPEARANCEUA, PHUR, SPECGRAV, PROTEINUA, GLUCUA, KETONESU, BILIRUBINUA, OCCULTUA, NITRITE, UROBILINOGEN, LEUKOCYTESUR, RBCUA, WBCUA, BACTERIA, SQUAMEPITHEL, HYALINECASTS in the last 48 hours.    Invalid input(s): WRIGHTSUR    Significant Imaging:   I have reviewed all pertinent imaging results/findings within the past 24 hours.      ABG  Recent Labs   Lab 07/12/22  1217   PH 7.297*   PO2 30*   PCO2 38.2   HCO3 18.6*   BE -8     Assessment/Plan:     Cardiac/Vascular  * NSTEMI (non-ST elevated myocardial infarction)  Aspirin, Plavix  Heparin drip  Metoprolol  Hold statin setting of elevated transaminases  Consider [facility], likely later this admission  Check TTE  A1c, lipid panel.    Cardiology consulted.    Elevated troponin  Due to NSTEMI  See assessment and plan for NSTEMI    Renal/  Metabolic acidosis  Should improve with HD and treatment of DKA        Hyperkalemia  Should improve with HD and treatment of DKA      ESRD (end stage renal disease)  Due to HTN and DM.  HD as per renal.    Endocrine  DKA (diabetic ketoacidosis)  DKA pathway  Insulin drip, fluids, serial electrolytes        Critical Care Daily Checklist:    A: Awake:  RASS Goal/Actual Goal:    Actual:     B: Spontaneous Breathing Trial Performed?     C: SAT & SBT Coordinated?  Not intubated                      D: Delirium: CAM-ICU     E: Early Mobility Performed?    F: Feeding Goal:    Status:     Current Diet Order   Procedures    Diet NPO      AS: Analgesia/Sedation PRN tylenol   T: Thromboembolic Prophylaxis Heparin drip   H: HOB > 300 Yes   U: Stress Ulcer Prophylaxis (if needed) Pepcid   G: Glucose Control Insulin drip   B: Bowel Function     I: Indwelling Catheter (Lines & Pfeiffer) Necessity Senna-doc   D: De-escalation of Antimicrobials/Pharmacotherapies none    Plan for the day/ETD Heparin drip  Insulin drip  Dialysis    Code Status:  Family/Goals of Care: Full Code       Critical Care Time: 60 minutes  Critical secondary to Patient has a condition that poses threat to life and bodily function: NSTEMI, DKA, ESRD requiring heparin drip, insulin drip, hemodialysis, and intensive monitoring with serial electrolytes.     Critical care was time spent personally by me on the following activities: development of treatment plan with patient or surrogate and bedside caregivers, discussions with consultants, evaluation of patient's response to treatment, examination of patient, ordering and performing treatments and interventions, ordering and review of laboratory studies, ordering and review of radiographic studies, pulse oximetry, re-evaluation of patient's condition. This critical care time did not overlap with that of any other provider or involve time for any procedures.    Thank you for your consult.      Gordon Paniagua MD  Critical Care Medicine  [facility]

## 2022-07-12 NOTE — SUBJECTIVE & OBJECTIVE
Past Medical History:   Diagnosis Date    Acute hypoxemic respiratory failure 11/26/2018    Anemia     Arthritis     back, hand, knees    Back pain     Cataract     CKD stage G4/A3, GFR 15 - 29 and albumin creatinine ratio >300 mg/g     GFR 40% Jan 2014 and 33% ub 3/2014 (BRG)    Coronary artery disease involving native coronary artery of native heart 11/30/2018    Diabetic retinopathy     DM (diabetes mellitus) type II controlled, neurological manifestation     Exudative age-related macular degeneration of left eye with active choroidal neovascularization 2/5/2019    GERD (gastroesophageal reflux disease)     Glaucoma     Heart failure     Hyperlipidemia     Hypertension     Non-ST elevation MI (NSTEMI) 11/28/2018    NSTEMI (non-ST elevated myocardial infarction) 11/27/2018    Peripheral neuropathy     Polyneuropathy     Proteinuria     >6 mo     Seizures     BRG 1/2014 -dick CT NRI showed small vessel    Stroke 2012,2014    Tobacco dependence     Type 2 diabetes with peripheral circulatory disorder, controlled        Past Surgical History:   Procedure Laterality Date    BREAST LUMPECTOMY Left     benign, when patient was 15 years old    BREAST MASS EXCISION      ,benign, age 15.    CATHETERIZATION OF BOTH LEFT AND RIGHT HEART N/A 11/28/2018    Procedure: CATHETERIZATION, HEART, BOTH LEFT AND RIGHT;  Surgeon: Michelle Holman MD;  Location: [facility] CATH LAB;  Service: Cardiology;  Laterality: N/A;    CATHETERIZATION OF BOTH LEFT AND RIGHT HEART N/A 11/30/2018    Procedure: CATHETERIZATION, HEART, BOTH LEFT AND RIGHT;  Surgeon: Michelle Holman MD;  Location: [facility] CATH LAB;  Service: Cardiology;  Laterality: N/A;    HYSTERECTOMY  1982    INSERTION OF SHUNT      LEFT HEART CATHETERIZATION Left 12/3/2018    Procedure: CATHETERIZATION, HEART, LEFT;  Surgeon: Michelle Holman MD;  Location: [facility] CATH LAB;  Service: Cardiology;  Laterality: Left;  LAD ATHERECTOMY STENT/ FEMORAL APPROACH    OOPHORECTOMY      wrist cyst Right  1980s    dorsal wrist cyst       Review of patient's allergies indicates:   Allergen Reactions    Codeine Swelling    Darvon [propoxyphene] Swelling    Atorvastatin      Muscle twitching       No current facility-administered medications on file prior to encounter.     Current Outpatient Medications on File Prior to Encounter   Medication Sig    aspirin (ECOTRIN) 81 MG EC tablet Take 1 tablet (81 mg total) by mouth once daily.    azelastine (ASTELIN) 137 mcg (0.1 %) nasal spray SMARTSI Spray(s) Both Nares Twice Daily PRN    benzonatate (TESSALON) 200 MG capsule Take 200 mg by mouth 3 (three) times daily.    clopidogreL (PLAVIX) 75 mg tablet Take 1 tablet (75 mg total) by mouth once daily.    insulin NPH/Reg human (HUMULIN, 70/30,) 100 unit/mL (70-30) InPn pen To do 28 units subq in AM and 8 units subq in PM.  Dose to be done within 15 min before or after meal. (Patient taking differently: To do 28 units subq in AM and 8 units subq in PM.  Dose to be done within 15 min before or after meal.)    isosorbide-hydrALAZINE 20-37.5 mg (BIDIL) 20-37.5 mg Tab Take 1 tablet by mouth 3 (three) times daily.    metoprolol tartrate (LOPRESSOR) 50 MG tablet Take 1 tablet (50 mg total) by mouth 2 (two) times daily.    semaglutide (OZEMPIC) 0.25 mg or 0.5 mg(2 mg/1.5 mL) pen injector Inject 0.25 mg into the skin every 7 days.    telmisartan (MICARDIS) 20 MG Tab Take 1 tablet (20 mg total) by mouth once daily.    [DISCONTINUED] blood-glucose meter kit To check BG 2 times daily, to use with insurance preferred meter    ACCU-CHEK ARDEN PLUS METER Mis USE TO TEST TWICE DAILY    blood sugar diagnostic Strp To check BG 2 times daily, to use with insurance preferred meter    cyanocobalamin (VITAMIN B-12) 1000 MCG tablet Take 100 mcg by mouth once daily.    folic acid (FOLVITE) 1 MG tablet Take 1 mg by mouth once daily.    lancets Misc To check BG 2 times daily, to use with insurance preferred meter    triamcinolone acetonide 0.1%  (KENALOG) 0.1 % cream Apply topically 2 (two) times daily.    [DISCONTINUED] blood sugar diagnostic Strp 1 strip by Misc.(Non-Drug; Combo Route) route 3 (three) times daily. relion ultima    [DISCONTINUED] erythromycin (ROMYCIN) ophthalmic ointment PLACE A SMALL AMOUNT INTO LEFT EYE THREE TIMES DAILY AS DIRECTED    [DISCONTINUED] hydrOXYzine HCL (ATARAX) 25 MG tablet Take 1 tablet (25 mg total) by mouth 3 (three) times daily as needed for Itching. (Patient not taking: Reported on 2022)    [DISCONTINUED] lancets (ACCU-CHEK SOFTCLIX LANCETS) Misc 1 lancet by Misc.(Non-Drug; Combo Route) route 3 (three) times daily.    [DISCONTINUED] LEXISCAN 0.4 mg/5 mL Syrg     [DISCONTINUED] sodium,potassium,mag sulfates (SUPREP BOWEL PREP KIT) 17.5-3.13-1.6 gram SolR Take as directed     Family History       Problem Relation (Age of Onset)    Cancer Maternal Grandmother    Diabetes Mother    Heart disease Mother    Hyperlipidemia Mother    Hypertension Mother    Muscular dystrophy Son          Tobacco Use    Smoking status: Former Smoker     Packs/day: 1.50     Years: 30.00     Pack years: 45.00     Types: Cigarettes     Quit date: 1990     Years since quittin.5    Smokeless tobacco: Former User   Substance and Sexual Activity    Alcohol use: No     Alcohol/week: 0.0 standard drinks    Drug use: No    Sexual activity: Not Currently     ROS  Objective:     Vital Signs (Most Recent):  Temp: 98.2 °F (36.8 °C) (22 1045)  Pulse: 78 (22 1530)  Resp: (Abnormal) 23 (22 1530)  BP: 117/60 (22 1530)  SpO2: 100 % (22 1530)   Vital Signs (24h Range):  Temp:  [98 °F (36.7 °C)-98.2 °F (36.8 °C)] 98.2 °F (36.8 °C)  Pulse:  [72-99] 78  Resp:  [16-28] 23  SpO2:  [94 %-100 %] 100 %  BP: (106-171)/(56-83) 117/60     Weight: 61.7 kg (136 lb)  Body mass index is 22.63 kg/m².    SpO2: 100 %  O2 Device (Oxygen Therapy): nasal cannula    No intake or output data in the 24 hours ending 22  1623    Lines/Drains/Airways       Drain       Name Duration         Hemodialysis AV Fistula 04/01/20 2203 Left upper arm 831 days              Peripheral Intravenous Line       Name Duration         Peripheral IV - Single Lumen 20 G Right Hand No Data         Peripheral IV - Single Lumen 07/12/22 1046 20 G Right Forearm <1 day                    Physical Exam    Significant Labs: All pertinent lab results from the last 24 hours have been reviewed.    Significant Imaging: X-Ray: CXR: X-Ray Chest 1 View (CXR): No results found for this visit on 07/12/22.

## 2022-07-12 NOTE — HPI
77 y.o. female patient with a PMHx of CKD, CAD s/p cardiac stenting, DM2, heart failure, HLD, HTN, NSTEMI, seizure, tobacco dependence, and CVA presents with NSTEMI and CHF.  EKG NSR, ST depression multiple leads. Initial troponin is 12. Pt being dialyzed today.

## 2022-07-12 NOTE — ED NOTES
Dialysis RN at bedside. Dialysis in process. Pt resting comfortably, aaox4, respirations even and unlabored. NAD. WCTM.

## 2022-07-12 NOTE — ED PROVIDER NOTES
SCRIBE #1 NOTE: I, Paige Mcgill, am scribing for, and in the presence of, Sebastien Khoury MD. I have scribed the entire note.      History      Chief Complaint   Patient presents with    Chest Pain     Seen at urgent care yesterday; c/o sharp pain with shortness of breath; hx of MI three years ago       Review of patient's allergies indicates:   Allergen Reactions    Codeine Swelling    Darvon [propoxyphene] Swelling    Atorvastatin      Muscle twitching        HPI   HPI    7/12/2022, 10:44 AM   History obtained from the patient      History of Present Illness: Avril Monzon is a 77 y.o. female patient with a PMHx of CKD, CAD s/p cardiac stenting, DM2, heart failure, HLD, HTN, NSTEMI, seizure, tobacco dependence, and CVA who presents to the Emergency Department for mid-sternal chest pressure described as a heaviness which onset gradually yesterday. Pt reports that she started to experience a cough 3 days ago and then started to experience chest pressure yesterday. The pt reports that her pain was still present this morning and it caused her to vomit two times. Symptoms are constant and moderate in severity. Pt reports that she notices her pain more when she lays down. No mitigating factors reported. Associated sxs include mid-sternal CP, N/V, a cough, and SOB. Patient denies any diaphoresis, fever, chills, diarrhea, abdominal pain, weakness, and all other sxs at this time. Prior Tx includes an 81 mg ASA taken this morning. Yesterday, the pt missed dialysis due to her sxs. Pt reports that her Cardiologist is Dr. Mejia. No further complaints or concerns at this time.         Arrival mode: EMS    PCP: Rose Parks MD       Past Medical History:  Past Medical History:   Diagnosis Date    Acute hypoxemic respiratory failure 11/26/2018    Anemia     Arthritis     back, hand, knees    Back pain     Cataract     CKD stage G4/A3, GFR 15 - 29 and albumin creatinine ratio >300 mg/g     GFR 40% Jan 2014 and 33%  ub 3/2014 (BRG)    Coronary artery disease involving native coronary artery of native heart 11/30/2018    Diabetic retinopathy     DM (diabetes mellitus) type II controlled, neurological manifestation     Exudative age-related macular degeneration of left eye with active choroidal neovascularization 2/5/2019    GERD (gastroesophageal reflux disease)     Glaucoma     Heart failure     Hyperlipidemia     Hypertension     Non-ST elevation MI (NSTEMI) 11/28/2018    NSTEMI (non-ST elevated myocardial infarction) 11/27/2018    Peripheral neuropathy     Polyneuropathy     Proteinuria     >6 mo     Seizures     BRG 1/2014 -dick CT NRI showed small vessel    Stroke 2012,2014    Tobacco dependence     Type 2 diabetes with peripheral circulatory disorder, controlled        Past Surgical History:  Past Surgical History:   Procedure Laterality Date    BREAST LUMPECTOMY Left     benign, when patient was 15 years old    BREAST MASS EXCISION      ,benign, age 15.    CATHETERIZATION OF BOTH LEFT AND RIGHT HEART N/A 11/28/2018    Procedure: CATHETERIZATION, HEART, BOTH LEFT AND RIGHT;  Surgeon: Michelle Holman MD;  Location: [facility] CATH LAB;  Service: Cardiology;  Laterality: N/A;    CATHETERIZATION OF BOTH LEFT AND RIGHT HEART N/A 11/30/2018    Procedure: CATHETERIZATION, HEART, BOTH LEFT AND RIGHT;  Surgeon: Michelle Holman MD;  Location: [facility] CATH LAB;  Service: Cardiology;  Laterality: N/A;    HYSTERECTOMY  1982    INSERTION OF SHUNT      LEFT HEART CATHETERIZATION Left 12/3/2018    Procedure: CATHETERIZATION, HEART, LEFT;  Surgeon: Michelle Holman MD;  Location: [facility] CATH LAB;  Service: Cardiology;  Laterality: Left;  LAD ATHERECTOMY STENT/ FEMORAL APPROACH    OOPHORECTOMY      wrist cyst Right 1980s    dorsal wrist cyst         Family History:  Family History   Problem Relation Age of Onset    Diabetes Mother     Hyperlipidemia Mother     Hypertension Mother     Heart disease Mother         MI     Muscular dystrophy Son     Cancer Maternal Grandmother         colon       Social History:  Social History     Tobacco Use    Smoking status: Former Smoker     Packs/day: 1.50     Years: 30.00     Pack years: 45.00     Types: Cigarettes     Quit date: 1990     Years since quittin.5    Smokeless tobacco: Former User   Substance and Sexual Activity    Alcohol use: No     Alcohol/week: 0.0 standard drinks    Drug use: No    Sexual activity: Not Currently       ROS   Review of Systems   Constitutional: Negative for chills, diaphoresis and fever.   HENT: Negative for sore throat.    Respiratory: Positive for cough and shortness of breath.    Cardiovascular: Positive for chest pain (mid-sternal).        (+) mid-sternal chest pressure   Gastrointestinal: Positive for nausea and vomiting. Negative for abdominal pain and diarrhea.   Genitourinary: Negative for dysuria.   Musculoskeletal: Negative for back pain.   Skin: Negative for rash.   Neurological: Negative for weakness.   Hematological: Does not bruise/bleed easily.   All other systems reviewed and are negative.    Physical Exam      Initial Vitals [22 1043]   BP Pulse Resp Temp SpO2   106/76 72 16 98 °F (36.7 °C) 96 %      MAP       --          Physical Exam  Nursing Notes and Vital Signs Reviewed.  Constitutional: Patient is in no acute distress. Well-developed and well-nourished.  Head: Atraumatic. Normocephalic.  Eyes: PERRL. EOM intact. Conjunctivae are not pale. No scleral icterus.  ENT: Mucous membranes are moist. Oropharynx is clear and symmetric.    Neck: Supple. Full ROM. No lymphadenopathy.  Cardiovascular: Regular rate. Regular rhythm. No murmurs, rubs, or gallops. Distal pulses are 2+ and symmetric. There is a LUE fistula with a palpable thrill.  Pulmonary/Chest: No respiratory distress. Clear to auscultation bilaterally. No wheezing or rales.  Abdominal: Soft and non-distended.  There is no tenderness.  No rebound, guarding, or  "rigidity.   Musculoskeletal: Moves all extremities. No obvious deformities. No edema.  Skin: Warm and dry.  Neurological:  Alert, awake, and appropriate.  Normal speech.  No acute focal neurological deficits are appreciated.  Psychiatric: Normal affect. Good eye contact. Appropriate in content.    ED Course    Critical Care    Date/Time: 7/12/2022 11:37 AM  Performed by: Sebastien Khoury MD  Authorized by: Sebastien Khoury MD   Direct patient critical care time: 10 minutes  Additional history critical care time: 5 minutes  Ordering / reviewing critical care time: 5 minutes  Documentation critical care time: 5 minutes  Consulting other physicians critical care time: 10 minutes  Total critical care time (exclusive of procedural time) : 35 minutes  Critical care time was exclusive of separately billable procedures and treating other patients and teaching time.  Critical care was necessary to treat or prevent imminent or life-threatening deterioration of the following conditions: NSTEMI and DKA.  Critical care was time spent personally by me on the following activities: blood draw for specimens, development of treatment plan with patient or surrogate, discussions with consultants, interpretation of cardiac output measurements, evaluation of patient's response to treatment, examination of patient, obtaining history from patient or surrogate, ordering and performing treatments and interventions, ordering and review of laboratory studies, ordering and review of radiographic studies, pulse oximetry, re-evaluation of patient's condition and review of old charts.        ED Vital Signs:  Vitals:    07/12/22 1043 07/12/22 1044 07/12/22 1045 07/12/22 1047   BP: 106/76      Pulse: 72  99 96   Resp: 16  16    Temp: 98 °F (36.7 °C)  98.2 °F (36.8 °C)    TempSrc: Oral  Oral    SpO2: 96%  100%    Weight:  61.7 kg (136 lb 0.4 oz)     Height: 5' 5" (1.651 m)       07/12/22 1050 07/12/22 1100 07/12/22 1120   BP: (!) 171/79 (!) 149/72 (!) " 148/74   Pulse: 97 94 92   Resp: (!) 22 18 (!) 28   Temp:      TempSrc:      SpO2: 100% 99% (!) 94%   Weight:      Height:          Abnormal Lab Results:  Labs Reviewed   CBC W/ AUTO DIFFERENTIAL - Abnormal; Notable for the following components:       Result Value    RBC 3.13 (*)     Hemoglobin 10.0 (*)     Hematocrit 31.6 (*)      (*)     MCH 31.9 (*)     MCHC 31.6 (*)     RDW 14.6 (*)     Lymph # 0.3 (*)     Gran % 91.8 (*)     Lymph % 3.7 (*)     All other components within normal limits   COMPREHENSIVE METABOLIC PANEL - Abnormal; Notable for the following components:    Potassium 6.1 (*)     CO2 12 (*)     Glucose 551 (*)     BUN 78 (*)     Creatinine 7.1 (*)      (*)     ALT 90 (*)     Anion Gap 23 (*)     eGFR if  6 (*)     eGFR if non  5 (*)     All other components within normal limits    Narrative:        GLU critical result(s) called and verbal readback obtained from   SAM RIDER RN by MOHSEN 07/12/2022 11:45   TROPONIN I - Abnormal; Notable for the following components:    Troponin I 12.581 (*)     All other components within normal limits   BETA - HYDROXYBUTYRATE, SERUM - Abnormal; Notable for the following components:    Beta-Hydroxybutyrate 1.8 (*)     All other components within normal limits   POCT GLUCOSE - Abnormal; Notable for the following components:    POCT Glucose >500 (*)     All other components within normal limits   ISTAT PROCEDURE - Abnormal; Notable for the following components:    POC PH 7.297 (*)     POC PO2 30 (*)     POC HCO3 18.6 (*)     POC SATURATED O2 50 (*)     All other components within normal limits   SARS-COV-2 RDRP GENE - Normal   APTT   PROTIME-INR   POCT GLUCOSE MONITORING CONTINUOUS        All Lab Results:  Results for orders placed or performed during the hospital encounter of 07/12/22   CBC auto differential   Result Value Ref Range    WBC 7.82 3.90 - 12.70 K/uL    RBC 3.13 (L) 4.00 - 5.40 M/uL    Hemoglobin 10.0 (L) 12.0 -  16.0 g/dL    Hematocrit 31.6 (L) 37.0 - 48.5 %     (H) 82 - 98 fL    MCH 31.9 (H) 27.0 - 31.0 pg    MCHC 31.6 (L) 32.0 - 36.0 g/dL    RDW 14.6 (H) 11.5 - 14.5 %    Platelets 194 150 - 450 K/uL    MPV 9.9 9.2 - 12.9 fL    Immature Granulocytes 0.3 0.0 - 0.5 %    Gran # (ANC) 7.2 1.8 - 7.7 K/uL    Immature Grans (Abs) 0.02 0.00 - 0.04 K/uL    Lymph # 0.3 (L) 1.0 - 4.8 K/uL    Mono # 0.3 0.3 - 1.0 K/uL    Eos # 0.0 0.0 - 0.5 K/uL    Baso # 0.01 0.00 - 0.20 K/uL    nRBC 0 0 /100 WBC    Gran % 91.8 (H) 38.0 - 73.0 %    Lymph % 3.7 (L) 18.0 - 48.0 %    Mono % 4.1 4.0 - 15.0 %    Eosinophil % 0.0 0.0 - 8.0 %    Basophil % 0.1 0.0 - 1.9 %    Differential Method Automated    Comprehensive metabolic panel   Result Value Ref Range    Sodium 137 136 - 145 mmol/L    Potassium 6.1 (H) 3.5 - 5.1 mmol/L    Chloride 102 95 - 110 mmol/L    CO2 12 (L) 23 - 29 mmol/L    Glucose 551 (HH) 70 - 110 mg/dL    BUN 78 (H) 8 - 23 mg/dL    Creatinine 7.1 (H) 0.5 - 1.4 mg/dL    Calcium 9.2 8.7 - 10.5 mg/dL    Total Protein 7.3 6.0 - 8.4 g/dL    Albumin 3.5 3.5 - 5.2 g/dL    Total Bilirubin 0.6 0.1 - 1.0 mg/dL    Alkaline Phosphatase 115 55 - 135 U/L     (H) 10 - 40 U/L    ALT 90 (H) 10 - 44 U/L    Anion Gap 23 (H) 8 - 16 mmol/L    eGFR if African American 6 (A) >60 mL/min/1.73 m^2    eGFR if non African American 5 (A) >60 mL/min/1.73 m^2   APTT   Result Value Ref Range    aPTT 24.4 21.0 - 32.0 sec   Troponin I   Result Value Ref Range    Troponin I 12.581 (H) 0.000 - 0.026 ng/mL   Protime-INR   Result Value Ref Range    Prothrombin Time 10.3 9.0 - 12.5 sec    INR 1.0 0.8 - 1.2   Beta - Hydroxybutyrate, Serum   Result Value Ref Range    Beta-Hydroxybutyrate 1.8 (H) 0.0 - 0.5 mmol/L   POCT COVID-19 Rapid Screening   Result Value Ref Range    POC Rapid COVID Negative Negative     Acceptable Yes    POCT glucose   Result Value Ref Range    POCT Glucose >500 (H) 70 - 110 mg/dL   ISTAT PROCEDURE   Result Value Ref Range     POC PH 7.297 (L) 7.35 - 7.45    POC PCO2 38.2 35 - 45 mmHg    POC PO2 30 (L) 40 - 60 mmHg    POC HCO3 18.6 (L) 24 - 28 mmol/L    POC BE -8 -2 to 2 mmol/L    POC SATURATED O2 50 (L) 95 - 100 %    Sample VENOUS     Site Other     Allens Test N/A     DelSys Nasal Can      *Note: Due to a large number of results and/or encounters for the requested time period, some results have not been displayed. A complete set of results can be found in Results Review.         Imaging Results:  Imaging Results          X-Ray Chest AP Portable (Final result)  Result time 07/12/22 11:02:11    Final result by Trev Dean MD (07/12/22 11:02:11)                 Impression:      1.  Mild vascular congestion with small effusions and cardiac silhouette size enlargement, concerning for chronic or recurrent interstitial pulmonary edema versus less likely interstitial infectious process.  Clinical correlation is advised.    2.  Stable findings as noted above.      Electronically signed by: Trev Dean MD  Date:    07/12/2022  Time:    11:02             Narrative:    EXAMINATION:  XR CHEST AP PORTABLE    CLINICAL HISTORY:  chest pain;    COMPARISON:  December 27, 2021    FINDINGS:  Blunting of the costophrenic angles with mild vascular congestion versus reticular interstitial changes throughout the lungs again seen.  The lungs are otherwise clear.  The cardiac silhouette size is enlarged.  The trachea is midline and the mediastinal width is normal. Negative for pneumothorax.  Negative for osseous abnormalities. Ectatic and tortuous aorta with aortic arch calcifications.  Degenerative changes of the spine and both shoulder girdles.  Vascular stent in the left arm soft tissues with a clip.                               The EKG was ordered, reviewed, and independently interpreted by the ED provider.  Interpretation time: 10:27  Rate: 95 BPM  Rhythm: normal sinus rhythm  Interpretation: Marked ST abnormality, possible inferior subendocardial  injury. Prolonged QT. No STEMI.           The Emergency Provider reviewed the vital signs and test results, which are outlined above.    ED Discussion     11:38 AM: Discussed pt's case with Dr. Sloan (Cardiology) who recommends IV heparin and admission to Hospital Medicine. Dr. Sloan states that he will see the pt in the ED as a consult.    11:49 AM:  CMP was reviewed. Glucose 500 and the pt states that she has not been taking her insulin.    12:34 PM: Discussed pt's case with Dr. Ramirez (Nephrology) who is now aware of the pt's case.    12:38 PM: Discussed case with JAREN Tomlinson ([facility]). Dr. Dos Santos agrees with current care and management of pt and accepts admission.   Admitting Service: Hospital Medicine  Admitting Physician: Dr. Dos Santos  Admit to: ICU    12:39 PM: Re-evaluated pt. I have discussed test results, shared treatment plan, and the need for admission with patient and family at bedside. Pt and family express understanding at this time and agree with all information. All questions answered. Pt and family have no further questions or concerns at this time. Pt is ready for admit.           ED Medication(s):  Medications   nitroGLYCERIN SL tablet 0.4 mg (0.4 mg Sublingual Given 7/12/22 1055)   heparin 25,000 units in dextrose 5% 250 mL (100 units/mL) infusion LOW INTENSITY nomogram - [facility] (12 Units/kg/hr × 61.7 kg Intravenous New Bag 7/12/22 1206)   heparin 25,000 units in dextrose 5% (100 units/ml) IV bolus from bag - ADDITIONAL PRN BOLUS - 60 units/kg (max bolus 4000 units) (has no administration in time range)   heparin 25,000 units in dextrose 5% (100 units/ml) IV bolus from bag - ADDITIONAL PRN BOLUS - 30 units/kg (max bolus 4000 units) (has no administration in time range)   dextrose 10 % infusion (has no administration in time range)   dextrose 10 % infusion (has no administration in time range)   insulin regular in 0.9 % NaCl 100 unit/100 mL (1 unit/mL) infusion (has no  administration in time range)   aspirin tablet 325 mg (325 mg Oral Given 7/12/22 1047)   heparin 25,000 units in dextrose 5% (100 units/ml) IV bolus from bag INITIAL BOLUS (max bolus 4000 units) (3,700 Units Intravenous Bolus from Bag 7/12/22 1207)   insulin regular injection 10 Units 0.1 mL (10 Units Intravenous Given 7/12/22 1157)           New Prescriptions    No medications on file         Medical Decision Making    Medical Decision Making:   Clinical Tests:   Lab Tests: Ordered and Reviewed  Radiological Study: Ordered and Reviewed  Medical Tests: Ordered and Reviewed           Scribe Attestation:   Scribe #1: I performed the above scribed service and the documentation accurately describes the services I performed. I attest to the accuracy of the note.    Attending:   Physician Attestation Statement for Scribe #1: I, Sebastien Khoury MD, personally performed the services described in this documentation, as scribed by Paige Mcgill, in my presence, and it is both accurate and complete.          Clinical Impression       ICD-10-CM ICD-9-CM   1. NSTEMI (non-ST elevated myocardial infarction)  I21.4 410.70   2. Chest pain  R07.9 786.50   3. Diabetic ketoacidosis without coma associated with other specified diabetes mellitus  E13.10 250.12   4. Hyperkalemia  E87.5 276.7   5. ESRD (end stage renal disease)  N18.6 585.6       Disposition:   Disposition: Admitted  Condition: Critical         Sebastien Khoury MD  07/12/22 9237

## 2022-07-12 NOTE — HPI
77W pmh ESRD, DM, CAD s/p stent, HFpEF (Gr2 DD), HLD, HTN, NSTEMI, seizure, tobacco dependence, and CVA presents with NSTEMI .  Her hospital course has been complicated by DKA.    In ED, EKG with multiple leads with ST depressions.  Initial Troponin approx 12.  She is undergoing HD for volume mgmt.   She is on the DKA protocol with insulin drip, fluids and serial electrolytes.

## 2022-07-12 NOTE — ED NOTES
Unable to collect BMP, magnesium, phosphorous, or troponin level at this time as pt is currently receiving dialysis. Will collect after dialysis.

## 2022-07-12 NOTE — PHARMACY MED REC
"Admission Medication History     The home medication history was taken by Randal Don.    You may go to "Admission" then "Reconcile Home Medications" tabs to review and/or act upon these items.      The home medication list has been updated by the Pharmacy department.    Please read ALL comments highlighted in yellow.    Please address this information as you see fit.     Feel free to contact us if you have any questions or require assistance.      The medications listed below were removed from the home medication list. Please reorder if appropriate:  Patient reports no longer taking the following medication(s):   ATARAX 25MG   ROMYCIN OPHTH    Medications listed below were obtained from: Patient/family and Analytic software- [employer]  (Not in a hospital admission)      Randal Don  [id number]    Current Outpatient Medications on File Prior to Encounter   Medication Sig Dispense Refill Last Dose    aspirin (ECOTRIN) 81 MG EC tablet Take 1 tablet (81 mg total) by mouth once daily. 90 tablet 1 2022    azelastine (ASTELIN) 137 mcg (0.1 %) nasal spray SMARTSI Spray(s) Both Nares Twice Daily PRN   2022    benzonatate (TESSALON) 200 MG capsule Take 200 mg by mouth 3 (three) times daily.   2022    clopidogreL (PLAVIX) 75 mg tablet Take 1 tablet (75 mg total) by mouth once daily. 90 tablet 3 2022    insulin NPH/Reg human (HUMULIN, 70/30,) 100 unit/mL (70-30) InPn pen To do 28 units subq in AM and 8 units subq in PM.  Dose to be done within 15 min before or after meal. (Patient taking differently: To do 28 units subq in AM and 8 units subq in PM.  Dose to be done within 15 min before or after meal.) 3 each 1 2022    isosorbide-hydrALAZINE 20-37.5 mg (BIDIL) 20-37.5 mg Tab Take 1 tablet by mouth 3 (three) times daily. 90 tablet 1 2022    metoprolol tartrate (LOPRESSOR) 50 MG tablet Take 1 tablet (50 mg total) by mouth 2 (two) times daily. 180 tablet 1 2022    " semaglutide (OZEMPIC) 0.25 mg or 0.5 mg(2 mg/1.5 mL) pen injector Inject 0.25 mg into the skin every 7 days. 1 pen 1 7/11/2022    telmisartan (MICARDIS) 20 MG Tab Take 1 tablet (20 mg total) by mouth once daily. 90 tablet 1 7/12/2022    [DISCONTINUED] blood-glucose meter kit To check BG 2 times daily, to use with insurance preferred meter 1 each 0 7/11/2022    ACCU-CHEK ARDEN PLUS METER Misc USE TO TEST TWICE DAILY  0     blood sugar diagnostic Strp To check BG 2 times daily, to use with insurance preferred meter 100 each 6     cyanocobalamin (VITAMIN B-12) 1000 MCG tablet Take 100 mcg by mouth once daily.       folic acid (FOLVITE) 1 MG tablet Take 1 mg by mouth once daily.       lancets Misc To check BG 2 times daily, to use with insurance preferred meter 100 each 6     triamcinolone acetonide 0.1% (KENALOG) 0.1 % cream Apply topically 2 (two) times daily. 80 g 2                              .

## 2022-07-12 NOTE — HOSPITAL COURSE
7/12: Aspirin.  Heparin drip for NSTEMI.  Insulin drip for DKA.  Undergoing HD.  7/20: Day 0: CABG x2. Arrives to ICU intubated. 2 PRBCs received intra-op. Currently on milrinone, epi, & nitric oxide @ 40ppm.   7/21: Day 1: CABG x 2. Remains intubated. Awakens to physical stimuli and follows commands. Increased output from chest tubes, cryo administered overnight. 1/3 PRBCs currently infusing. Rpt labs pending. Nitric @ 18ppm. Remains on Epi, vaso, milronone. Currently on CRRT   7/22: Day 2- s/p CABG x2. Nitric weaned yesterday afernoon. SBT failed- RR >45-50 while on spontaneous, able to tolerate for ~1 hr, will re-attempt today. Labs stable.    7/23 days 3 status post CABG x2.  Extubated yesterday.  On room air.  Had a bowel movement this morning.  Epinephrine Milrinone Precedex discontinued.  Chest tubes output 900 mL.   7/24 O2 sat 98% 1 liter/minute.  Day 4 status post CABG x2 status post HD yesterday 2 L removed.  Afebrile .  Positive 8.4 L since admission.  7/25 O2 sat 99% on room air.  Day 5 post CABG x2.  On hemodialysis.  Afebrile.  Complains of weakness and decreased activity and appetite.  7/26 O2 sat 94% on room air.  Status post HD yesterday.  Day 6 post CABG.  Afebrile.  Continues to complain of weakness and decreased activity and appetite.

## 2022-07-12 NOTE — NURSING
07/12/22 1730   Post-Hemodialysis Assessment   Blood Volume Processed (Liters) 71.8 L   Dialyzer Clearance Lightly streaked   Total UF (mL) 1000 mL   Patient Response to Treatment Tolerated well   Post-Hemodialysis Comments Treatment completed x3 hours. C/o cramping during treatment. UF goal lowered. No other issues

## 2022-07-12 NOTE — SUBJECTIVE & OBJECTIVE
Past Medical History:   Diagnosis Date    Acute hypoxemic respiratory failure 11/26/2018    Anemia     Arthritis     back, hand, knees    Back pain     Cataract     CKD stage G4/A3, GFR 15 - 29 and albumin creatinine ratio >300 mg/g     GFR 40% Jan 2014 and 33% ub 3/2014 ([facility])    Coronary artery disease involving native coronary artery of native heart 11/30/2018    Diabetic retinopathy     DM (diabetes mellitus) type II controlled, neurological manifestation     Exudative age-related macular degeneration of left eye with active choroidal neovascularization 2/5/2019    GERD (gastroesophageal reflux disease)     Glaucoma     Heart failure     Hyperlipidemia     Hypertension     Non-ST elevation MI (NSTEMI) 11/28/2018    NSTEMI (non-ST elevated myocardial infarction) 11/27/2018    Peripheral neuropathy     Polyneuropathy     Proteinuria     >6 mo     Seizures     [facility] 1/2014 -dick CT NRI showed small vessel    Stroke 2012,2014    Tobacco dependence     Type 2 diabetes with peripheral circulatory disorder, controlled        Past Surgical History:   Procedure Laterality Date    BREAST LUMPECTOMY Left     benign, when patient was 15 years old    BREAST MASS EXCISION      ,benign, age 15.    CATHETERIZATION OF BOTH LEFT AND RIGHT HEART N/A 11/28/2018    Procedure: CATHETERIZATION, HEART, BOTH LEFT AND RIGHT;  Surgeon: Michelle Holman MD;  Location: [facility] CATH LAB;  Service: Cardiology;  Laterality: N/A;    CATHETERIZATION OF BOTH LEFT AND RIGHT HEART N/A 11/30/2018    Procedure: CATHETERIZATION, HEART, BOTH LEFT AND RIGHT;  Surgeon: Michelle Holman MD;  Location: [facility] CATH LAB;  Service: Cardiology;  Laterality: N/A;    HYSTERECTOMY  1982    INSERTION OF SHUNT      LEFT HEART CATHETERIZATION Left 12/3/2018    Procedure: CATHETERIZATION, HEART, LEFT;  Surgeon: Michelle Holman MD;  Location: [facility] CATH LAB;  Service: Cardiology;  Laterality: Left;  LAD ATHERECTOMY STENT/ FEMORAL APPROACH    OOPHORECTOMY      wrist cyst Right  1980s    dorsal wrist cyst       Review of patient's allergies indicates:   Allergen Reactions    Codeine Swelling    Darvon [propoxyphene] Swelling    Atorvastatin      Muscle twitching       No current facility-administered medications on file prior to encounter.     Current Outpatient Medications on File Prior to Encounter   Medication Sig    aspirin (ECOTRIN) 81 MG EC tablet Take 1 tablet (81 mg total) by mouth once daily.    azelastine (ASTELIN) 137 mcg (0.1 %) nasal spray SMARTSI Spray(s) Both Nares Twice Daily PRN    benzonatate (TESSALON) 200 MG capsule Take 200 mg by mouth 3 (three) times daily.    clopidogreL (PLAVIX) 75 mg tablet Take 1 tablet (75 mg total) by mouth once daily.    insulin NPH/Reg human (HUMULIN, 70/30,) 100 unit/mL (70-30) InPn pen To do 28 units subq in AM and 8 units subq in PM.  Dose to be done within 15 min before or after meal. (Patient taking differently: To do 28 units subq in AM and 8 units subq in PM.  Dose to be done within 15 min before or after meal.)    isosorbide-hydrALAZINE 20-37.5 mg (BIDIL) 20-37.5 mg Tab Take 1 tablet by mouth 3 (three) times daily.    metoprolol tartrate (LOPRESSOR) 50 MG tablet Take 1 tablet (50 mg total) by mouth 2 (two) times daily.    semaglutide (OZEMPIC) 0.25 mg or 0.5 mg(2 mg/1.5 mL) pen injector Inject 0.25 mg into the skin every 7 days.    telmisartan (MICARDIS) 20 MG Tab Take 1 tablet (20 mg total) by mouth once daily.    [DISCONTINUED] blood-glucose meter kit To check BG 2 times daily, to use with insurance preferred meter    ACCU-CHEK ARDEN PLUS METER Mis USE TO TEST TWICE DAILY    blood sugar diagnostic Strp To check BG 2 times daily, to use with insurance preferred meter    cyanocobalamin (VITAMIN B-12) 1000 MCG tablet Take 100 mcg by mouth once daily.    folic acid (FOLVITE) 1 MG tablet Take 1 mg by mouth once daily.    lancets Misc To check BG 2 times daily, to use with insurance preferred meter    triamcinolone acetonide 0.1%  (KENALOG) 0.1 % cream Apply topically 2 (two) times daily.    [DISCONTINUED] blood sugar diagnostic Strp 1 strip by Misc.(Non-Drug; Combo Route) route 3 (three) times daily. relion ultima    [DISCONTINUED] erythromycin (ROMYCIN) ophthalmic ointment PLACE A SMALL AMOUNT INTO LEFT EYE THREE TIMES DAILY AS DIRECTED    [DISCONTINUED] hydrOXYzine HCL (ATARAX) 25 MG tablet Take 1 tablet (25 mg total) by mouth 3 (three) times daily as needed for Itching. (Patient not taking: Reported on 2022)    [DISCONTINUED] lancets (ACCU-CHEK SOFTCLIX LANCETS) Misc 1 lancet by Misc.(Non-Drug; Combo Route) route 3 (three) times daily.    [DISCONTINUED] LEXISCAN 0.4 mg/5 mL Syrg     [DISCONTINUED] sodium,potassium,mag sulfates (SUPREP BOWEL PREP KIT) 17.5-3.13-1.6 gram SolR Take as directed     Family History       Problem Relation (Age of Onset)    Cancer Maternal Grandmother    Diabetes Mother    Heart disease Mother    Hyperlipidemia Mother    Hypertension Mother    Muscular dystrophy Son          Tobacco Use    Smoking status: Former Smoker     Packs/day: 1.50     Years: 30.00     Pack years: 45.00     Types: Cigarettes     Quit date: 1990     Years since quittin.5    Smokeless tobacco: Former User   Substance and Sexual Activity    Alcohol use: No     Alcohol/week: 0.0 standard drinks    Drug use: No    Sexual activity: Not Currently     Review of Systems   Constitutional:  Positive for activity change and fatigue.   HENT: Negative.     Eyes: Negative.    Respiratory:  Positive for shortness of breath.    Cardiovascular:  Positive for chest pain.   Gastrointestinal:  Positive for nausea and vomiting.   Endocrine: Negative.    Genitourinary: Negative.    Musculoskeletal: Negative.    Skin: Negative.    Allergic/Immunologic: Negative.    Neurological: Negative.    Hematological: Negative.    Psychiatric/Behavioral: Negative.     Objective:     Vital Signs (Most Recent):  Temp: 98.2 °F (36.8 °C) (22 1045)  Pulse: 73  (07/12/22 1630)  Resp: (!) 26 (07/12/22 1630)  BP: 131/70 (07/12/22 1630)  SpO2: 100 % (07/12/22 1630)   Vital Signs (24h Range):  Temp:  [98 °F (36.7 °C)-98.2 °F (36.8 °C)] 98.2 °F (36.8 °C)  Pulse:  [72-99] 73  Resp:  [16-28] 26  SpO2:  [94 %-100 %] 100 %  BP: (106-171)/(56-83) 131/70     Weight: 61.7 kg (136 lb)  Body mass index is 22.63 kg/m².    Physical Exam  Vitals and nursing note reviewed.   Constitutional:       Appearance: She is ill-appearing.   HENT:      Head: Normocephalic and atraumatic.      Nose: Nose normal.      Mouth/Throat:      Mouth: Mucous membranes are moist.   Eyes:      Extraocular Movements: Extraocular movements intact.      Conjunctiva/sclera: Conjunctivae normal.      Pupils: Pupils are equal, round, and reactive to light.   Cardiovascular:      Rate and Rhythm: Normal rate and regular rhythm.      Pulses: Normal pulses.      Heart sounds: No murmur heard.    No friction rub.   Pulmonary:      Effort: Pulmonary effort is normal. No respiratory distress.      Breath sounds: No stridor. No wheezing, rhonchi or rales.   Abdominal:      General: Abdomen is flat. Bowel sounds are normal. There is no distension.      Palpations: There is no mass.      Tenderness: There is no abdominal tenderness. There is no guarding or rebound.      Hernia: No hernia is present.   Musculoskeletal:         General: No swelling, tenderness, deformity or signs of injury. Normal range of motion.      Cervical back: Normal range of motion and neck supple. No rigidity or tenderness.   Skin:     General: Skin is warm.      Capillary Refill: Capillary refill takes less than 2 seconds.      Coloration: Skin is not jaundiced.      Findings: No bruising.   Neurological:      General: No focal deficit present.      Mental Status: She is alert.      Cranial Nerves: No cranial nerve deficit.      Sensory: No sensory deficit.      Motor: No weakness.      Coordination: Coordination normal.   Psychiatric:         Mood and  Affect: Mood normal.         CRANIAL NERVES     CN III, IV, VI   Pupils are equal, round, and reactive to light.     Significant Labs: All pertinent labs within the past 24 hours have been reviewed.      Significant Imaging: I have reviewed all pertinent imaging results/findings within the past 24 hours.

## 2022-07-12 NOTE — PROGRESS NOTES
Pharmacist Renal Dose Adjustment Note    Avril Monzon is a 77 y.o. female being treated with the medication  famotidine    Patient Data:    Vital Signs (Most Recent):  Temp: 98.2 °F (36.8 °C) (07/12/22 1045)  Pulse: 73 (07/12/22 1630)  Resp: (!) 26 (07/12/22 1630)  BP: 131/70 (07/12/22 1630)  SpO2: 100 % (07/12/22 1630)   Vital Signs (72h Range):  Temp:  [98 °F (36.7 °C)-98.2 °F (36.8 °C)]   Pulse:  [72-99]   Resp:  [16-28]   BP: (106-171)/(56-83)   SpO2:  [94 %-100 %]      Recent Labs   Lab 07/12/22  1048   CREATININE 7.1*     Serum creatinine: 7.1 mg/dL (H) 07/12/22 1048  Estimated creatinine clearance: 6.2 mL/min (A)    Medication:famotidine 20 mg po bid will be changed to medication:famotidine 20 mg po daily per renal dosing protocol.    Pharmacist's Name: Ronna Bear [facility]  Pharmacist's Extension: [phone]

## 2022-07-12 NOTE — ASSESSMENT & PLAN NOTE
77 y.o. female patient with a PMHx of CKD, CAD s/p cardiac stenting, DM2, heart failure, HLD, HTN, NSTEMI, seizure, tobacco dependence, and CVA presents with NSTEMI and CHF.  EKG NSR, ST depression multiple leads. Initial troponin is 12. Pt being dialyzed today.    Continue IV heparin, cardiac cath once CHF stable, being dialyzed today

## 2022-07-12 NOTE — Clinical Note
Diagnosis: NSTEMI (non-ST elevated myocardial infarction) [974655]   Admitting Provider:: ENRIQUETA BERNARDO [[id number]]   Future Attending Provider: ENRIQUETA BERNARDO [[id number]]   Reason for IP Medical Treatment  (Clinical interventions that can only be accomplished in the IP setting? ) :: NSTEMI, DKA   Estimated Length of Stay:: 2 midnights   I certify that Inpatient services for greater than or equal to 2 midnights are medically necessary:: Yes   Plans for Post-Acute care--if anticipated (pick the single best option):: A. No post acute care anticipated at this time

## 2022-07-13 LAB
ANION GAP SERPL CALC-SCNC: 14 MMOL/L (ref 8–16)
ANION GAP SERPL CALC-SCNC: 15 MMOL/L (ref 8–16)
ANION GAP SERPL CALC-SCNC: 16 MMOL/L (ref 8–16)
ANION GAP SERPL CALC-SCNC: 17 MMOL/L (ref 8–16)
ANION GAP SERPL CALC-SCNC: 18 MMOL/L (ref 8–16)
ANION GAP SERPL CALC-SCNC: 19 MMOL/L (ref 8–16)
AORTIC ROOT ANNULUS: 2.78 CM
APTT BLDCRRT: 39.2 SEC (ref 21–32)
APTT BLDCRRT: 64.7 SEC (ref 21–32)
ASCENDING AORTA: 2.32 CM
AV INDEX (PROSTH): 0.78
AV MEAN GRADIENT: 4 MMHG
AV PEAK GRADIENT: 6 MMHG
AV VALVE AREA: 1.56 CM2
AV VELOCITY RATIO: 0.79
BASOPHILS # BLD AUTO: 0.01 K/UL (ref 0–0.2)
BASOPHILS # BLD AUTO: 0.03 K/UL (ref 0–0.2)
BASOPHILS NFR BLD: 0.1 % (ref 0–1.9)
BASOPHILS NFR BLD: 0.4 % (ref 0–1.9)
BSA FOR ECHO PROCEDURE: 1.68 M2
BUN SERPL-MCNC: 33 MG/DL (ref 8–23)
BUN SERPL-MCNC: 41 MG/DL (ref 8–23)
BUN SERPL-MCNC: 44 MG/DL (ref 8–23)
BUN SERPL-MCNC: 46 MG/DL (ref 8–23)
BUN SERPL-MCNC: 50 MG/DL (ref 8–23)
BUN SERPL-MCNC: 51 MG/DL (ref 8–23)
CALCIUM SERPL-MCNC: 7.7 MG/DL (ref 8.7–10.5)
CALCIUM SERPL-MCNC: 8.4 MG/DL (ref 8.7–10.5)
CALCIUM SERPL-MCNC: 8.4 MG/DL (ref 8.7–10.5)
CALCIUM SERPL-MCNC: 8.7 MG/DL (ref 8.7–10.5)
CALCIUM SERPL-MCNC: 8.8 MG/DL (ref 8.7–10.5)
CALCIUM SERPL-MCNC: 8.9 MG/DL (ref 8.7–10.5)
CHLORIDE SERPL-SCNC: 93 MMOL/L (ref 95–110)
CHLORIDE SERPL-SCNC: 95 MMOL/L (ref 95–110)
CHLORIDE SERPL-SCNC: 95 MMOL/L (ref 95–110)
CHLORIDE SERPL-SCNC: 96 MMOL/L (ref 95–110)
CHOLEST SERPL-MCNC: 171 MG/DL (ref 120–199)
CHOLEST/HDLC SERPL: 2.9 {RATIO} (ref 2–5)
CO2 SERPL-SCNC: 19 MMOL/L (ref 23–29)
CO2 SERPL-SCNC: 21 MMOL/L (ref 23–29)
CO2 SERPL-SCNC: 21 MMOL/L (ref 23–29)
CO2 SERPL-SCNC: 23 MMOL/L (ref 23–29)
CO2 SERPL-SCNC: 24 MMOL/L (ref 23–29)
CO2 SERPL-SCNC: 27 MMOL/L (ref 23–29)
CREAT SERPL-MCNC: 3.9 MG/DL (ref 0.5–1.4)
CREAT SERPL-MCNC: 4.2 MG/DL (ref 0.5–1.4)
CREAT SERPL-MCNC: 4.5 MG/DL (ref 0.5–1.4)
CREAT SERPL-MCNC: 4.7 MG/DL (ref 0.5–1.4)
CREAT SERPL-MCNC: 4.8 MG/DL (ref 0.5–1.4)
CREAT SERPL-MCNC: 5.1 MG/DL (ref 0.5–1.4)
CV ECHO LV RWT: 0.43 CM
DIFFERENTIAL METHOD: ABNORMAL
DIFFERENTIAL METHOD: ABNORMAL
DOP CALC AO PEAK VEL: 1.26 M/S
DOP CALC AO VTI: 25.4 CM
DOP CALC LVOT AREA: 2 CM2
DOP CALC LVOT DIAMETER: 1.6 CM
DOP CALC LVOT PEAK VEL: 1 M/S
DOP CALC LVOT STROKE VOLUME: 39.59 CM3
DOP CALC RVOT PEAK VEL: 0.62 M/S
DOP CALC RVOT VTI: 13.3 CM
DOP CALCLVOT PEAK VEL VTI: 19.7 CM
E WAVE DECELERATION TIME: 123.42 MSEC
E/A RATIO: 1.31
ECHO LV POSTERIOR WALL: 0.96 CM (ref 0.6–1.1)
EJECTION FRACTION: 55 %
EOSINOPHIL # BLD AUTO: 0 K/UL (ref 0–0.5)
EOSINOPHIL # BLD AUTO: 0 K/UL (ref 0–0.5)
EOSINOPHIL NFR BLD: 0 % (ref 0–8)
EOSINOPHIL NFR BLD: 0.3 % (ref 0–8)
ERYTHROCYTE [DISTWIDTH] IN BLOOD BY AUTOMATED COUNT: 14.1 % (ref 11.5–14.5)
ERYTHROCYTE [DISTWIDTH] IN BLOOD BY AUTOMATED COUNT: 14.1 % (ref 11.5–14.5)
EST. GFR  (AFRICAN AMERICAN): 10 ML/MIN/1.73 M^2
EST. GFR  (AFRICAN AMERICAN): 11 ML/MIN/1.73 M^2
EST. GFR  (AFRICAN AMERICAN): 12 ML/MIN/1.73 M^2
EST. GFR  (AFRICAN AMERICAN): 9 ML/MIN/1.73 M^2
EST. GFR  (NON AFRICAN AMERICAN): 10 ML/MIN/1.73 M^2
EST. GFR  (NON AFRICAN AMERICAN): 11 ML/MIN/1.73 M^2
EST. GFR  (NON AFRICAN AMERICAN): 8 ML/MIN/1.73 M^2
EST. GFR  (NON AFRICAN AMERICAN): 8 ML/MIN/1.73 M^2
EST. GFR  (NON AFRICAN AMERICAN): 9 ML/MIN/1.73 M^2
EST. GFR  (NON AFRICAN AMERICAN): 9 ML/MIN/1.73 M^2
ESTIMATED AVG GLUCOSE: 220 MG/DL (ref 68–131)
FRACTIONAL SHORTENING: 22 % (ref 28–44)
GLUCOSE SERPL-MCNC: 157 MG/DL (ref 70–110)
GLUCOSE SERPL-MCNC: 202 MG/DL (ref 70–110)
GLUCOSE SERPL-MCNC: 212 MG/DL (ref 70–110)
GLUCOSE SERPL-MCNC: 259 MG/DL (ref 70–110)
GLUCOSE SERPL-MCNC: 270 MG/DL (ref 70–110)
GLUCOSE SERPL-MCNC: 673 MG/DL (ref 70–110)
HBA1C MFR BLD: 9.3 % (ref 4–5.6)
HCT VFR BLD AUTO: 24.7 % (ref 37–48.5)
HCT VFR BLD AUTO: 28.6 % (ref 37–48.5)
HDLC SERPL-MCNC: 59 MG/DL (ref 40–75)
HDLC SERPL: 34.5 % (ref 20–50)
HGB BLD-MCNC: 8.1 G/DL (ref 12–16)
HGB BLD-MCNC: 8.9 G/DL (ref 12–16)
IMM GRANULOCYTES # BLD AUTO: 0.02 K/UL (ref 0–0.04)
IMM GRANULOCYTES # BLD AUTO: 0.02 K/UL (ref 0–0.04)
IMM GRANULOCYTES NFR BLD AUTO: 0.3 % (ref 0–0.5)
IMM GRANULOCYTES NFR BLD AUTO: 0.3 % (ref 0–0.5)
INTERVENTRICULAR SEPTUM: 1.15 CM (ref 0.6–1.1)
IVC DIAMETER: 1.93 CM
IVRT: 39.96 MSEC
LA MAJOR: 4.08 CM
LA MINOR: 3.59 CM
LDLC SERPL CALC-MCNC: 102.8 MG/DL (ref 63–159)
LEFT ATRIUM SIZE: 3.85 CM
LEFT INTERNAL DIMENSION IN SYSTOLE: 3.44 CM (ref 2.1–4)
LEFT VENTRICLE DIASTOLIC VOLUME INDEX: 52.9 ML/M2
LEFT VENTRICLE DIASTOLIC VOLUME: 88.87 ML
LEFT VENTRICLE MASS INDEX: 96 G/M2
LEFT VENTRICLE SYSTOLIC VOLUME INDEX: 29 ML/M2
LEFT VENTRICLE SYSTOLIC VOLUME: 48.8 ML
LEFT VENTRICULAR INTERNAL DIMENSION IN DIASTOLE: 4.43 CM (ref 3.5–6)
LEFT VENTRICULAR MASS: 161 G
LVOT MG: 1.94 MMHG
LVOT MV: 0.63 CM/S
LYMPHOCYTES # BLD AUTO: 0.6 K/UL (ref 1–4.8)
LYMPHOCYTES # BLD AUTO: 1.1 K/UL (ref 1–4.8)
LYMPHOCYTES NFR BLD: 13.6 % (ref 18–48)
LYMPHOCYTES NFR BLD: 7.7 % (ref 18–48)
MCH RBC QN AUTO: 31.3 PG (ref 27–31)
MCH RBC QN AUTO: 32.1 PG (ref 27–31)
MCHC RBC AUTO-ENTMCNC: 31.1 G/DL (ref 32–36)
MCHC RBC AUTO-ENTMCNC: 32.8 G/DL (ref 32–36)
MCV RBC AUTO: 101 FL (ref 82–98)
MCV RBC AUTO: 98 FL (ref 82–98)
MONOCYTES # BLD AUTO: 0.5 K/UL (ref 0.3–1)
MONOCYTES # BLD AUTO: 0.6 K/UL (ref 0.3–1)
MONOCYTES NFR BLD: 6.3 % (ref 4–15)
MONOCYTES NFR BLD: 7.2 % (ref 4–15)
MV PEAK A VEL: 0.85 M/S
MV PEAK E VEL: 1.11 M/S
MV STENOSIS PRESSURE HALF TIME: 35.79 MS
MV VALVE AREA P 1/2 METHOD: 6.15 CM2
NEUTROPHILS # BLD AUTO: 6.3 K/UL (ref 1.8–7.7)
NEUTROPHILS # BLD AUTO: 6.7 K/UL (ref 1.8–7.7)
NEUTROPHILS NFR BLD: 79.1 % (ref 38–73)
NEUTROPHILS NFR BLD: 84.7 % (ref 38–73)
NONHDLC SERPL-MCNC: 112 MG/DL
NRBC BLD-RTO: 0 /100 WBC
NRBC BLD-RTO: 0 /100 WBC
PHOSPHATE SERPL-MCNC: 4.4 MG/DL (ref 2.7–4.5)
PISA MRMAX VEL: 6.14 M/S
PISA TR MAX VEL: 4.26 M/S
PLATELET # BLD AUTO: 146 K/UL (ref 150–450)
PLATELET # BLD AUTO: 184 K/UL (ref 150–450)
PMV BLD AUTO: 9.6 FL (ref 9.2–12.9)
PMV BLD AUTO: 9.8 FL (ref 9.2–12.9)
POCT GLUCOSE: 166 MG/DL (ref 70–110)
POCT GLUCOSE: 172 MG/DL (ref 70–110)
POCT GLUCOSE: 283 MG/DL (ref 70–110)
POTASSIUM SERPL-SCNC: 3.2 MMOL/L (ref 3.5–5.1)
POTASSIUM SERPL-SCNC: 3.6 MMOL/L (ref 3.5–5.1)
POTASSIUM SERPL-SCNC: 3.7 MMOL/L (ref 3.5–5.1)
POTASSIUM SERPL-SCNC: 4.1 MMOL/L (ref 3.5–5.1)
POTASSIUM SERPL-SCNC: 4.4 MMOL/L (ref 3.5–5.1)
POTASSIUM SERPL-SCNC: 4.5 MMOL/L (ref 3.5–5.1)
PV MEAN GRADIENT: 0.85 MMHG
RA MAJOR: 3.88 CM
RBC # BLD AUTO: 2.52 M/UL (ref 4–5.4)
RBC # BLD AUTO: 2.84 M/UL (ref 4–5.4)
SODIUM SERPL-SCNC: 131 MMOL/L (ref 136–145)
SODIUM SERPL-SCNC: 131 MMOL/L (ref 136–145)
SODIUM SERPL-SCNC: 132 MMOL/L (ref 136–145)
SODIUM SERPL-SCNC: 133 MMOL/L (ref 136–145)
SODIUM SERPL-SCNC: 135 MMOL/L (ref 136–145)
SODIUM SERPL-SCNC: 137 MMOL/L (ref 136–145)
STJ: 2.42 CM
TR MAX PG: 73 MMHG
TRIGL SERPL-MCNC: 46 MG/DL (ref 30–150)
TROPONIN I SERPL DL<=0.01 NG/ML-MCNC: >50 NG/ML (ref 0–0.03)
WBC # BLD AUTO: 7.94 K/UL (ref 3.9–12.7)
WBC # BLD AUTO: 7.95 K/UL (ref 3.9–12.7)

## 2022-07-13 PROCEDURE — 11000001 HC ACUTE MED/SURG PRIVATE ROOM

## 2022-07-13 PROCEDURE — 25000003 PHARM REV CODE 250: Performed by: INTERNAL MEDICINE

## 2022-07-13 PROCEDURE — 99233 SBSQ HOSP IP/OBS HIGH 50: CPT | Mod: ,,, | Performed by: INTERNAL MEDICINE

## 2022-07-13 PROCEDURE — C1894 INTRO/SHEATH, NON-LASER: HCPCS | Performed by: INTERNAL MEDICINE

## 2022-07-13 PROCEDURE — 99233 PR SUBSEQUENT HOSPITAL CARE,LEVL III: ICD-10-PCS | Mod: ,,, | Performed by: INTERNAL MEDICINE

## 2022-07-13 PROCEDURE — 84484 ASSAY OF TROPONIN QUANT: CPT | Performed by: INTERNAL MEDICINE

## 2022-07-13 PROCEDURE — 94761 N-INVAS EAR/PLS OXIMETRY MLT: CPT

## 2022-07-13 PROCEDURE — 93458 L HRT ARTERY/VENTRICLE ANGIO: CPT | Performed by: INTERNAL MEDICINE

## 2022-07-13 PROCEDURE — S5010 5% DEXTROSE AND 0.45% SALINE: HCPCS | Performed by: NURSE PRACTITIONER

## 2022-07-13 PROCEDURE — 63600175 PHARM REV CODE 636 W HCPCS: Performed by: INTERNAL MEDICINE

## 2022-07-13 PROCEDURE — 27201423 OPTIME MED/SURG SUP & DEVICES STERILE SUPPLY: Performed by: INTERNAL MEDICINE

## 2022-07-13 PROCEDURE — C9399 UNCLASSIFIED DRUGS OR BIOLOG: HCPCS | Performed by: NURSE PRACTITIONER

## 2022-07-13 PROCEDURE — C1769 GUIDE WIRE: HCPCS | Performed by: INTERNAL MEDICINE

## 2022-07-13 PROCEDURE — 85025 COMPLETE CBC W/AUTO DIFF WBC: CPT | Mod: 91 | Performed by: INTERNAL MEDICINE

## 2022-07-13 PROCEDURE — 84100 ASSAY OF PHOSPHORUS: CPT | Performed by: INTERNAL MEDICINE

## 2022-07-13 PROCEDURE — 25500020 PHARM REV CODE 255: Performed by: INTERNAL MEDICINE

## 2022-07-13 PROCEDURE — 80048 BASIC METABOLIC PNL TOTAL CA: CPT | Mod: 91 | Performed by: INTERNAL MEDICINE

## 2022-07-13 PROCEDURE — 99152 PR MOD CONSCIOUS SEDATION, SAME PHYS, 5+ YRS, FIRST 15 MIN: ICD-10-PCS | Mod: ,,, | Performed by: INTERNAL MEDICINE

## 2022-07-13 PROCEDURE — 27000221 HC OXYGEN, UP TO 24 HOURS

## 2022-07-13 PROCEDURE — C1760 CLOSURE DEV, VASC: HCPCS | Performed by: INTERNAL MEDICINE

## 2022-07-13 PROCEDURE — 80061 LIPID PANEL: CPT | Performed by: INTERNAL MEDICINE

## 2022-07-13 PROCEDURE — 99900035 HC TECH TIME PER 15 MIN (STAT)

## 2022-07-13 PROCEDURE — 85730 THROMBOPLASTIN TIME PARTIAL: CPT | Performed by: INTERNAL MEDICINE

## 2022-07-13 PROCEDURE — 21400001 HC TELEMETRY ROOM

## 2022-07-13 PROCEDURE — 99152 MOD SED SAME PHYS/QHP 5/>YRS: CPT | Performed by: INTERNAL MEDICINE

## 2022-07-13 PROCEDURE — 99153 MOD SED SAME PHYS/QHP EA: CPT | Performed by: INTERNAL MEDICINE

## 2022-07-13 PROCEDURE — 99152 MOD SED SAME PHYS/QHP 5/>YRS: CPT | Mod: ,,, | Performed by: INTERNAL MEDICINE

## 2022-07-13 PROCEDURE — 36415 COLL VENOUS BLD VENIPUNCTURE: CPT | Performed by: INTERNAL MEDICINE

## 2022-07-13 PROCEDURE — 93458 PR CATH PLACE/CORON ANGIO, IMG SUPER/INTERP,W LEFT HEART VENTRICULOGRAPHY: ICD-10-PCS | Mod: 26,,, | Performed by: INTERNAL MEDICINE

## 2022-07-13 PROCEDURE — 85025 COMPLETE CBC W/AUTO DIFF WBC: CPT | Performed by: EMERGENCY MEDICINE

## 2022-07-13 PROCEDURE — 25000003 PHARM REV CODE 250: Performed by: NURSE PRACTITIONER

## 2022-07-13 PROCEDURE — 93458 L HRT ARTERY/VENTRICLE ANGIO: CPT | Mod: 26,,, | Performed by: INTERNAL MEDICINE

## 2022-07-13 PROCEDURE — 63600175 PHARM REV CODE 636 W HCPCS: Performed by: NURSE PRACTITIONER

## 2022-07-13 RX ORDER — SODIUM CHLORIDE 9 MG/ML
INJECTION, SOLUTION INTRAVENOUS
Status: DISCONTINUED | OUTPATIENT
Start: 2022-07-13 | End: 2022-07-20 | Stop reason: HOSPADM

## 2022-07-13 RX ORDER — MIDAZOLAM HYDROCHLORIDE 1 MG/ML
INJECTION, SOLUTION INTRAMUSCULAR; INTRAVENOUS
Status: DISCONTINUED | OUTPATIENT
Start: 2022-07-13 | End: 2022-07-13 | Stop reason: HOSPADM

## 2022-07-13 RX ORDER — FENTANYL CITRATE 50 UG/ML
INJECTION, SOLUTION INTRAMUSCULAR; INTRAVENOUS
Status: DISCONTINUED | OUTPATIENT
Start: 2022-07-13 | End: 2022-07-13 | Stop reason: HOSPADM

## 2022-07-13 RX ORDER — DIPHENHYDRAMINE HCL 50 MG
50 CAPSULE ORAL ONCE
Status: DISCONTINUED | OUTPATIENT
Start: 2022-07-13 | End: 2022-07-13 | Stop reason: HOSPADM

## 2022-07-13 RX ORDER — ATROPINE SULFATE 0.1 MG/ML
0.5 INJECTION INTRAVENOUS
Status: DISCONTINUED | OUTPATIENT
Start: 2022-07-13 | End: 2022-07-20 | Stop reason: HOSPADM

## 2022-07-13 RX ORDER — SODIUM CHLORIDE 9 MG/ML
INJECTION, SOLUTION INTRAVENOUS CONTINUOUS
Status: ACTIVE | OUTPATIENT
Start: 2022-07-13 | End: 2022-07-13

## 2022-07-13 RX ORDER — DIPHENHYDRAMINE HYDROCHLORIDE 50 MG/ML
INJECTION INTRAMUSCULAR; INTRAVENOUS
Status: DISCONTINUED | OUTPATIENT
Start: 2022-07-13 | End: 2022-07-13 | Stop reason: HOSPADM

## 2022-07-13 RX ADMIN — METOPROLOL TARTRATE 50 MG: 50 TABLET, FILM COATED ORAL at 08:07

## 2022-07-13 RX ADMIN — ACETAMINOPHEN 650 MG: 325 TABLET ORAL at 09:07

## 2022-07-13 RX ADMIN — DEXTROSE AND SODIUM CHLORIDE: 5; .45 INJECTION, SOLUTION INTRAVENOUS at 12:07

## 2022-07-13 RX ADMIN — FAMOTIDINE 20 MG: 20 TABLET ORAL at 08:07

## 2022-07-13 RX ADMIN — INSULIN ASPART 3 UNITS: 100 INJECTION, SOLUTION INTRAVENOUS; SUBCUTANEOUS at 05:07

## 2022-07-13 RX ADMIN — METOPROLOL TARTRATE 50 MG: 50 TABLET, FILM COATED ORAL at 09:07

## 2022-07-13 RX ADMIN — ASPIRIN 81 MG: 81 TABLET, COATED ORAL at 08:07

## 2022-07-13 RX ADMIN — CLOPIDOGREL 75 MG: 75 TABLET, FILM COATED ORAL at 08:07

## 2022-07-13 RX ADMIN — INSULIN ASPART 1 UNITS: 100 INJECTION, SOLUTION INTRAVENOUS; SUBCUTANEOUS at 09:07

## 2022-07-13 RX ADMIN — INSULIN DETEMIR 6 UNITS: 100 INJECTION, SOLUTION SUBCUTANEOUS at 09:07

## 2022-07-13 RX ADMIN — DOCUSATE SODIUM AND SENNOSIDES 2 TABLET: 8.6; 5 TABLET, FILM COATED ORAL at 08:07

## 2022-07-13 NOTE — NURSING
Reached out to [facility] for clarification on continuous IVF orders, NP ordered D5w 0.45% @ 50ml/hr since BG<250.

## 2022-07-13 NOTE — NURSING
Notified by Lab of Critical . Notified NP, rechecked CBG with accu check machine, CBG resulted 166. No new orders. Will continue to monitor.

## 2022-07-13 NOTE — ASSESSMENT & PLAN NOTE
Start DKA protocol   Cont insulin drip   Monitor  Electrolytes and CBG    RESOLVED  Cont long and short acting insulin   Keep CBG < 180

## 2022-07-13 NOTE — ED NOTES
Call from Alaina in lab to report critical levels. Hospital medicine call team notified. New orders received.

## 2022-07-13 NOTE — CONSULTS
RD working remotely for coverage. RD consulted for DM diet edu. Pt has been in cath lab for procedure. Diet edu not appropriate today and remains NPO. Will attach diet edu to d/c instructions and inpatient RD to follow up.     RD follow-up: Yes    Thanks for the consult!  Erica Webber RDN, LDN

## 2022-07-13 NOTE — BRIEF OP NOTE
<[facility] - Cath Lab ([facility])  Surgery Department  Operative Note    SUMMARY     Date of Procedure: 7/13/2022     Procedure: Procedure(s) (LRB):  CATHETERIZATION, HEART, LEFT (Left)     Surgeon(s) and Role:     * Abhi Sloan MD - Primary    Assisting Surgeon: None    Pre-Operative Diagnosis: Elevated troponin I level [R77.8]  Congestive heart failure, unspecified HF chronicity, unspecified heart failure type [I50.9]  Coronary artery disease involving native heart, unspecified vessel or lesion type, unspecified whether angina present [I25.10]    Post-Operative Diagnosis: Post-Op Diagnosis Codes:     * Elevated troponin I level [R77.8]     * Congestive heart failure, unspecified HF chronicity, unspecified heart failure type [I50.9]     * Coronary artery disease involving native heart, unspecified vessel or lesion type, unspecified whether angina present [I25.10]    Anesthesia: RN IV Sedation    Technical Procedures Used: [facility]    Description of the Findings of the Procedure: [facility], DX: NSTEMI, FINDINGS: MULTIVESSEL CAD, EF=40%  CONSULT CVT-CABG  Significant Surgical Tasks Conducted by the Assistant(s), if Applicable: NONE    Complications: No    Estimated Blood Loss (EBL): < 50 cc           Implants: * No implants in log *    Specimens:   Specimen (24h ago, onward)            None                  Condition: Good    Disposition: PACU - hemodynamically stable.    Attestation: I was present and scrubbed for the entire procedure.

## 2022-07-13 NOTE — CONSULTS
[facility] - Emergency Dept.  Nephrology  Consult Note    Patient Name: Avril Monzon  MRN: [id number]  Admission Date: 7/12/2022  Hospital Length of Stay: 1 days  Attending Provider: Suhas Prakash, *   Primary Care Physician: Rose Parks MD  Principal Problem:NSTEMI (non-ST elevated myocardial infarction)    Consults  Subjective:     HPI:     77-year-old female with history of end-stage renal disease.  Presented to the emergency department with chest pain and shortness of breath.  She did not have dialysis yesterday due to not feeling well.  Nephrology has been consulted for dialysis maintenance.  Patient was seen in the emergency department.  In bed resting comfortably.  No acute distress noted.  States that she is feeling better since she arrived in the emergency department.  She still has some shortness of breath however.  Her chest discomfort has improved.    07/13/2022:  Patient was seen in her hospital room.  In bed resting comfortably.  No acute distress noted.  States that she is feeling better since having dialysis yesterday.  No longer has chest discomfort or shortness of breath.    Past Medical History:   Diagnosis Date    Acute hypoxemic respiratory failure 11/26/2018    Anemia     Arthritis     back, hand, knees    Back pain     Cataract     CKD stage G4/A3, GFR 15 - 29 and albumin creatinine ratio >300 mg/g     GFR 40% Jan 2014 and 33% ub 3/2014 ([facility])    Coronary artery disease involving native coronary artery of native heart 11/30/2018    Diabetic retinopathy     DM (diabetes mellitus) type II controlled, neurological manifestation     Exudative age-related macular degeneration of left eye with active choroidal neovascularization 2/5/2019    GERD (gastroesophageal reflux disease)     Glaucoma     Heart failure     Hyperlipidemia     Hypertension     Non-ST elevation MI (NSTEMI) 11/28/2018    NSTEMI (non-ST elevated myocardial infarction) 11/27/2018    Peripheral neuropathy      Polyneuropathy     Proteinuria     >6 mo     Seizures     [facility] 1/2014 -dick CT NRI showed small vessel    Stroke 2012,2014    Tobacco dependence     Type 2 diabetes with peripheral circulatory disorder, controlled        Past Surgical History:   Procedure Laterality Date    BREAST LUMPECTOMY Left     benign, when patient was 15 years old    BREAST MASS EXCISION      ,benign, age 15.    CATHETERIZATION OF BOTH LEFT AND RIGHT HEART N/A 11/28/2018    Procedure: CATHETERIZATION, HEART, BOTH LEFT AND RIGHT;  Surgeon: Michelle Holman MD;  Location: [facility] CATH LAB;  Service: Cardiology;  Laterality: N/A;    CATHETERIZATION OF BOTH LEFT AND RIGHT HEART N/A 11/30/2018    Procedure: CATHETERIZATION, HEART, BOTH LEFT AND RIGHT;  Surgeon: Michelle Holman MD;  Location: [facility] CATH LAB;  Service: Cardiology;  Laterality: N/A;    HYSTERECTOMY  1982    INSERTION OF SHUNT      LEFT HEART CATHETERIZATION Left 12/3/2018    Procedure: CATHETERIZATION, HEART, LEFT;  Surgeon: Michelle Holman MD;  Location: [facility] CATH LAB;  Service: Cardiology;  Laterality: Left;  LAD ATHERECTOMY STENT/ FEMORAL APPROACH    OOPHORECTOMY      wrist cyst Right 1980s    dorsal wrist cyst       Review of patient's allergies indicates:   Allergen Reactions    Codeine Swelling    Darvon [propoxyphene] Swelling    Atorvastatin      Muscle twitching     Current Facility-Administered Medications   Medication Frequency    0.9%  NaCl infusion Continuous    acetaminophen tablet 650 mg Q4H PRN    aspirin EC tablet 81 mg Daily    clopidogreL tablet 75 mg Daily    dextrose 10 % infusion PRN    dextrose 10 % infusion PRN    dextrose 10% bolus 125 mL PRN    dextrose 10% bolus 250 mL PRN    dextrose 5 % and 0.45 % NaCl infusion Continuous    diphenhydrAMINE capsule 50 mg Once    famotidine tablet 20 mg Daily    glucagon (human recombinant) injection 1 mg PRN    glucose chewable tablet 16 g PRN    glucose chewable tablet 24 g PRN    heparin  25,000 units in dextrose 5% (100 units/ml) IV bolus from bag - ADDITIONAL PRN BOLUS - 30 units/kg (max bolus 4000 units) PRN    heparin 25,000 units in dextrose 5% (100 units/ml) IV bolus from bag - ADDITIONAL PRN BOLUS - 60 units/kg (max bolus 4000 units) PRN    heparin 25,000 units in dextrose 5% 250 mL (100 units/mL) infusion LOW INTENSITY nomogram - [facility] Continuous    insulin aspart U-100 pen 0-5 Units QID (AC + HS) PRN    insulin detemir U-100 pen 6 Units QHS    metoprolol tartrate (LOPRESSOR) tablet 50 mg BID    nitroGLYCERIN SL tablet 0.4 mg Q5 Min PRN    promethazine (PHENERGAN) 12.5 mg in dextrose 5 % 50 mL IVPB Q6H PRN    senna-docusate 8.6-50 mg per tablet 2 tablet Daily    sodium chloride 0.9% flush 10 mL PRN     Family History     Problem Relation (Age of Onset)    Cancer Maternal Grandmother    Diabetes Mother    Heart disease Mother    Hyperlipidemia Mother    Hypertension Mother    Muscular dystrophy Son        Tobacco Use    Smoking status: Former Smoker     Packs/day: 1.50     Years: 30.00     Pack years: 45.00     Types: Cigarettes     Quit date: 1990     Years since quittin.5    Smokeless tobacco: Former User   Substance and Sexual Activity    Alcohol use: No     Alcohol/week: 0.0 standard drinks    Drug use: No    Sexual activity: Not Currently     Review of Systems   Constitutional: Negative.    HENT: Negative.    Eyes: Negative.    Respiratory: Positive for shortness of breath.    Cardiovascular: Positive for chest pain.   Gastrointestinal: Negative.    Genitourinary: Negative.    Musculoskeletal: Negative.    Skin: Negative.    Neurological: Negative.      Objective:     Vital Signs (Most Recent):  Temp: 99.3 °F (37.4 °C) (22)  Pulse: 70 (22)  Resp: 18 (22)  BP: 132/62 (22)  SpO2: 97 % (22)  O2 Device (Oxygen Therapy): nasal cannula (22) Vital Signs (24h Range):  Temp:  [97.8 °F (36.6 °C)-99.3 °F (37.4  °C)] 99.3 °F (37.4 °C)  Pulse:  [70-99] 70  Resp:  [16-28] 18  SpO2:  [94 %-100 %] 97 %  BP: (106-171)/(56-83) 132/62     Weight: 61.7 kg (136 lb) (07/12/22 1400)  Body mass index is 22.63 kg/m².  Body surface area is 1.68 meters squared.    I/O last 3 completed shifts:  In: -   Out: 1000 [Other:1000]    Physical Exam  Constitutional:       Appearance: Normal appearance.   HENT:      Head: Normocephalic and atraumatic.   Eyes:      General: No scleral icterus.     Extraocular Movements: Extraocular movements intact.      Pupils: Pupils are equal, round, and reactive to light.   Cardiovascular:      Rate and Rhythm: Normal rate and regular rhythm.   Pulmonary:      Effort: Pulmonary effort is normal.      Breath sounds: Rales present.   Abdominal:      General: Abdomen is flat.      Palpations: There is no mass.   Musculoskeletal:      Right lower leg: No edema.      Left lower leg: No edema.   Skin:     General: Skin is warm and dry.   Neurological:      General: No focal deficit present.      Mental Status: She is alert and oriented to person, place, and time.   Psychiatric:         Mood and Affect: Mood normal.         Behavior: Behavior normal.         Significant Labs:  BMP:   Recent Labs   Lab 07/12/22 2020 07/13/22 0200 07/13/22  0621   *   < > 157*      < > 137   K 3.8   < > 3.6   CL 95   < > 96   CO2 26   < > 27   BUN 35*   < > 41*   CREATININE 3.6*   < > 4.2*   CALCIUM 9.3   < > 8.4*   MG 2.1  --   --     < > = values in this interval not displayed.     CMP:   Recent Labs   Lab 07/12/22  1048 07/12/22 2020 07/13/22  0621   *   < > 157*   CALCIUM 9.2   < > 8.4*   ALBUMIN 3.5  --   --    PROT 7.3  --   --       < > 137   K 6.1*   < > 3.6   CO2 12*   < > 27      < > 96   BUN 78*   < > 41*   CREATININE 7.1*   < > 4.2*   ALKPHOS 115  --   --    ALT 90*  --   --    *  --   --    BILITOT 0.6  --   --     < > = values in this interval not displayed.     All labs within the  past 24 hours have been reviewed.    Significant Imaging:  Labs: Reviewed  Reviewed    Assessment/Plan:     Active Diagnoses:    Diagnosis Date Noted POA    PRINCIPAL PROBLEM:  NSTEMI (non-ST elevated myocardial infarction) [I21.4] 07/12/2022 Yes    DKA (diabetic ketoacidosis) [E11.10] 07/12/2022 Yes    Hyperkalemia [E87.5] 07/12/2022 Yes    Metabolic acidosis [E87.2] 07/12/2022 Yes    ESRD (end stage renal disease) [N18.6]  Yes    Hypertension associated with diabetes [E11.59, I15.2]  Yes     Chronic      Problems Resolved During this Admission:       1. Shortness of breath:  Underwent dialysis yesterday, uneventful.  Breathing has improved.  She usually has dialysis Monday Wednesdays and Fridays.  She had skipped dialysis on Monday secondary to not feeling well.  Will keep on a TTS schedule while she is in the hospital.    2. Chest pain:  She has elevated troponins.  Cardiology has been consulted.  Planned left heart catheterization noted when she is more stable.    3. Hyperkalemia:  Improved after dialysis, down to 3.2 this morning.    4. Metabolic acidosis:  Improved with dialysis, 23 this morning.    Approximately 40 minutes was spent in face-to-face conversation and chart review.    Thank you for your consult.     Adrian Ramirez MD  Nephrology  [facility] - Emergency Dept.

## 2022-07-13 NOTE — SUBJECTIVE & OBJECTIVE
Interval History:     Review of Systems   Constitutional:  Positive for activity change and fatigue.   HENT: Negative.     Eyes: Negative.    Respiratory:  Negative for shortness of breath.    Cardiovascular:  Negative for chest pain.   Gastrointestinal:  Negative for nausea and vomiting.   Endocrine: Negative.    Genitourinary: Negative.    Musculoskeletal: Negative.    Skin: Negative.    Allergic/Immunologic: Negative.    Neurological: Negative.    Hematological: Negative.    Psychiatric/Behavioral: Negative.     Objective:     Vital Signs (Most Recent):  Temp: 98.7 °F (37.1 °C) (07/13/22 1549)  Pulse: 72 (07/13/22 1549)  Resp: 18 (07/13/22 1549)  BP: (!) 96/54 (07/13/22 1549)  SpO2: 97 % (07/13/22 1549)   Vital Signs (24h Range):  Temp:  [97.8 °F (36.6 °C)-99.3 °F (37.4 °C)] 98.7 °F (37.1 °C)  Pulse:  [70-80] 72  Resp:  [17-25] 18  SpO2:  [94 %-100 %] 97 %  BP: ()/(54-70) 96/54     Weight: 61.7 kg (136 lb)  Body mass index is 22.63 kg/m².    Intake/Output Summary (Last 24 hours) at 7/13/2022 1641  Last data filed at 7/13/2022 0732  Gross per 24 hour   Intake 1162.37 ml   Output 1000 ml   Net 162.37 ml      Physical Exam  Vitals and nursing note reviewed.   Constitutional:       Appearance: She is ill-appearing.   HENT:      Head: Normocephalic and atraumatic.      Nose: Nose normal.      Mouth/Throat:      Mouth: Mucous membranes are moist.   Eyes:      Extraocular Movements: Extraocular movements intact.      Conjunctiva/sclera: Conjunctivae normal.      Pupils: Pupils are equal, round, and reactive to light.   Cardiovascular:      Rate and Rhythm: Normal rate and regular rhythm.      Pulses: Normal pulses.      Heart sounds: No murmur heard.    No friction rub.   Pulmonary:      Effort: Pulmonary effort is normal. No respiratory distress.      Breath sounds: No stridor. No wheezing, rhonchi or rales.   Abdominal:      General: Abdomen is flat. Bowel sounds are normal. There is no distension.       Palpations: There is no mass.      Tenderness: There is no abdominal tenderness. There is no guarding or rebound.      Hernia: No hernia is present.   Musculoskeletal:         General: No swelling, tenderness, deformity or signs of injury. Normal range of motion.      Cervical back: Normal range of motion and neck supple. No rigidity or tenderness.   Skin:     General: Skin is warm.      Capillary Refill: Capillary refill takes less than 2 seconds.      Coloration: Skin is not jaundiced.      Findings: No bruising.   Neurological:      General: No focal deficit present.      Mental Status: She is alert.      Cranial Nerves: No cranial nerve deficit.      Sensory: No sensory deficit.      Motor: No weakness.      Coordination: Coordination normal.   Psychiatric:         Mood and Affect: Mood normal.       Significant Labs: All pertinent labs within the past 24 hours have been reviewed.      Significant Imaging: I have reviewed all pertinent imaging results/findings within the past 24 hours.

## 2022-07-13 NOTE — NURSING
Report called to nurse caring for patient. Patient AAOX4 with no complaints of pain at this time. Tele monitor placed on patient. Patient transported to room 550 at this time.

## 2022-07-13 NOTE — ASSESSMENT & PLAN NOTE
Cardiology consulted  Cont Heparin drip , asa , plavix and BB  TTE  Trend troponin level   S/P LHC which multiple vessel ds   CVT consulted

## 2022-07-13 NOTE — CONSULTS
Consult for  Diabetes Education     Spoke with Daughter Elizabeth, Pt is followed by outpatient diabetes and has been seen inpatient in previous visit. She recently moved in with daughter. Pt has been diabetic for over 20 years. Her Daughter helps with managing diabetes, She said some barriers to good glucose control is pt will sometimes skip insulin dose when not feeling well, Pt likes sweets. Over all daughter has a good understanding of pt. Diagnosis and how to manage diet, medications and s/s and tx of hypo and hyper. Pt has kory device for checking blood glucose.

## 2022-07-13 NOTE — CONSULTS
[facility]  Cardiothoracic Surgery  Consult Note    Patient Name: Avril Monzon  MRN: [id number]  Admission Date: 2022  Attending Physician: Suhas Prakash, *  Referring Provider: Self, Aaareferral    Patient information was obtained from patient, ER records and primary team.     Inpatient consult to Cardiothoracic Surgery  Consult performed by: Sanju Locke MD  Consult ordered by: Abhi Sloan MD        Subjective:     Chief Complaint/Reason for Admission:  NSTEMI    History of Present Illness:  77-year-old female with a history of end-stage renal disease diabetes mellitus hypertension.  History of coronary artery disease status post PCI in 2018 was having symptoms of chest pain for 4 days when she came to the emergency room had troponin elevation she had a left heart catheterization which showed significant disease involving her coronaries in the fall of totally occluded right with filling of her posterior descending artery from left-to-right collaterals and disease involving her proximal left anterior descending artery and circumflex artery.  The patient is on intermittent hemodialysis and she missed 1 of her dialysis last week when the symptoms started.    No current facility-administered medications on file prior to encounter.     Current Outpatient Medications on File Prior to Encounter   Medication Sig    aspirin (ECOTRIN) 81 MG EC tablet Take 1 tablet (81 mg total) by mouth once daily.    azelastine (ASTELIN) 137 mcg (0.1 %) nasal spray SMARTSI Spray(s) Both Nares Twice Daily PRN    benzonatate (TESSALON) 200 MG capsule Take 200 mg by mouth 3 (three) times daily.    clopidogreL (PLAVIX) 75 mg tablet Take 1 tablet (75 mg total) by mouth once daily.    insulin NPH/Reg human (HUMULIN, 70/30,) 100 unit/mL (70-30) InPn pen To do 28 units subq in AM and 8 units subq in PM.  Dose to be done within 15 min before or after meal. (Patient taking differently: To do 28 units subq in  AM and 8 units subq in PM.  Dose to be done within 15 min before or after meal.)    isosorbide-hydrALAZINE 20-37.5 mg (BIDIL) 20-37.5 mg Tab Take 1 tablet by mouth 3 (three) times daily.    metoprolol tartrate (LOPRESSOR) 50 MG tablet Take 1 tablet (50 mg total) by mouth 2 (two) times daily.    semaglutide (OZEMPIC) 0.25 mg or 0.5 mg(2 mg/1.5 mL) pen injector Inject 0.25 mg into the skin every 7 days.    telmisartan (MICARDIS) 20 MG Tab Take 1 tablet (20 mg total) by mouth once daily.    ACCU-CHEK ARDEN PLUS METER Misc USE TO TEST TWICE DAILY    blood sugar diagnostic Strp To check BG 2 times daily, to use with insurance preferred meter    cyanocobalamin (VITAMIN B-12) 1000 MCG tablet Take 100 mcg by mouth once daily.    folic acid (FOLVITE) 1 MG tablet Take 1 mg by mouth once daily.    lancets Misc To check BG 2 times daily, to use with insurance preferred meter    triamcinolone acetonide 0.1% (KENALOG) 0.1 % cream Apply topically 2 (two) times daily.       Review of patient's allergies indicates:   Allergen Reactions    Codeine Swelling    Darvon [propoxyphene] Swelling    Atorvastatin      Muscle twitching       Past Medical History:   Diagnosis Date    Acute hypoxemic respiratory failure 11/26/2018    Anemia     Arthritis     back, hand, knees    Back pain     Cataract     CKD stage G4/A3, GFR 15 - 29 and albumin creatinine ratio >300 mg/g     GFR 40% Jan 2014 and 33% ub 3/2014 ([facility])    Coronary artery disease involving native coronary artery of native heart 11/30/2018    Diabetic retinopathy     DM (diabetes mellitus) type II controlled, neurological manifestation     Exudative age-related macular degeneration of left eye with active choroidal neovascularization 2/5/2019    GERD (gastroesophageal reflux disease)     Glaucoma     Heart failure     Hyperlipidemia     Hypertension     Non-ST elevation MI (NSTEMI) 11/28/2018    NSTEMI (non-ST elevated myocardial infarction) 11/27/2018     Peripheral neuropathy     Polyneuropathy     Proteinuria     >6 mo     Seizures     [facility] 2014 -dick CT NRI showed small vessel    Stroke ,    Tobacco dependence     Type 2 diabetes with peripheral circulatory disorder, controlled      Past Surgical History:   Procedure Laterality Date    BREAST LUMPECTOMY Left     benign, when patient was 15 years old    BREAST MASS EXCISION      ,benign, age 15.    CATHETERIZATION OF BOTH LEFT AND RIGHT HEART N/A 2018    Procedure: CATHETERIZATION, HEART, BOTH LEFT AND RIGHT;  Surgeon: Michelle Holman MD;  Location: [facility] CATH LAB;  Service: Cardiology;  Laterality: N/A;    CATHETERIZATION OF BOTH LEFT AND RIGHT HEART N/A 2018    Procedure: CATHETERIZATION, HEART, BOTH LEFT AND RIGHT;  Surgeon: Michelle Holman MD;  Location: [facility] CATH LAB;  Service: Cardiology;  Laterality: N/A;    HYSTERECTOMY  1982    INSERTION OF SHUNT      LEFT HEART CATHETERIZATION Left 12/3/2018    Procedure: CATHETERIZATION, HEART, LEFT;  Surgeon: Michelle Holman MD;  Location: [facility] CATH LAB;  Service: Cardiology;  Laterality: Left;  LAD ATHERECTOMY STENT/ FEMORAL APPROACH    OOPHORECTOMY      wrist cyst Right 1980s    dorsal wrist cyst     Family History     Problem Relation (Age of Onset)    Cancer Maternal Grandmother    Diabetes Mother    Heart disease Mother    Hyperlipidemia Mother    Hypertension Mother    Muscular dystrophy Son        Tobacco Use    Smoking status: Former Smoker     Packs/day: 1.50     Years: 30.00     Pack years: 45.00     Types: Cigarettes     Quit date: 1990     Years since quittin.5    Smokeless tobacco: Former User   Substance and Sexual Activity    Alcohol use: No     Alcohol/week: 0.0 standard drinks    Drug use: No    Sexual activity: Not Currently     Review of Systems   Constitutional: Positive for activity change and appetite change.   HENT: Positive for congestion. Negative for dental problem, nosebleeds and sore throat.     Eyes: Negative for discharge and visual disturbance.   Respiratory: Positive for cough, chest tightness and shortness of breath. Negative for stridor.    Cardiovascular: Positive for chest pain and leg swelling.   Gastrointestinal: Negative for abdominal distention and abdominal pain.   Genitourinary: Negative for difficulty urinating and dysuria.   Musculoskeletal: Positive for arthralgias and myalgias. Negative for back pain and joint swelling.   Allergic/Immunologic: Negative for environmental allergies.   Neurological: Negative for dizziness, syncope and headaches.   Hematological: Does not bruise/bleed easily.   Psychiatric/Behavioral: Negative for behavioral problems.     Objective:     Vital Signs (Most Recent):  Temp: 98.7 °F (37.1 °C) (07/13/22 1549)  Pulse: 72 (07/13/22 1549)  Resp: 18 (07/13/22 1549)  BP: (!) 96/54 (07/13/22 1549)  SpO2: 97 % (07/13/22 1549) Vital Signs (24h Range):  Temp:  [97.8 °F (36.6 °C)-99.3 °F (37.4 °C)] 98.7 °F (37.1 °C)  Pulse:  [70-80] 72  Resp:  [17-26] 18  SpO2:  [94 %-100 %] 97 %  BP: ()/(54-70) 96/54     Weight: 61.7 kg (136 lb)  Body mass index is 22.63 kg/m².    SpO2: 97 %  O2 Device (Oxygen Therapy): nasal cannula     Intake/Output - Last 3 Shifts       07/11 0700 07/12 0659 07/12 0700 07/13 0659 07/13 0700 07/14 0659    I.V. (mL/kg)   1162.4 (18.8)    Total Intake(mL/kg)   1162.4 (18.8)    Other  1000     Total Output  1000     Net  -1000 +1162.4                  Lines/Drains/Airways     Drain  Duration                Hemodialysis AV Fistula 04/01/20 2203 Left upper arm 832 days          Peripheral Intravenous Line  Duration                Peripheral IV - Single Lumen 20 G Right Hand -- days         Peripheral IV - Single Lumen 07/12/22 1046 20 G Right Forearm 1 day         Peripheral IV - Single Lumen 07/12/22 1714 20 G Right Antecubital <1 day                 STS Risk Score:   Risk of Mortality:  11.044%  Renal Failure:  NA  Permanent  Stroke:  2.683%  Prolonged Ventilation:  30.950%  DSW Infection:  0.387%  Reoperation:  4.818%  Morbidity or Mortality:  38.663%  Short Length of Stay:  6.988%  Long Length of Stay:  19.778%      Physical Exam  Vitals and nursing note reviewed.   Constitutional:       Appearance: She is ill-appearing.   HENT:      Head: Normocephalic.      Mouth/Throat:      Mouth: Mucous membranes are moist.   Eyes:      Extraocular Movements: Extraocular movements intact.      Pupils: Pupils are equal, round, and reactive to light.   Cardiovascular:      Rate and Rhythm: Normal rate and regular rhythm.      Pulses: Normal pulses.      Heart sounds: Normal heart sounds.   Pulmonary:      Effort: Pulmonary effort is normal.      Breath sounds: Rales present.   Abdominal:      Palpations: Abdomen is soft.   Musculoskeletal:      Cervical back: Normal range of motion and neck supple.      Right lower leg: Edema present.      Left lower leg: Edema present.      Comments: Fistula with the bruit in the left arm   Skin:     General: Skin is warm.      Capillary Refill: Capillary refill takes less than 2 seconds.   Neurological:      General: No focal deficit present.      Mental Status: She is alert and oriented to person, place, and time.   Psychiatric:         Mood and Affect: Mood normal.       Reviewed her left heart catheterization which showed calcified aorta and disease involving the proximal left anterior descending artery with calcification of the wall as well as the disease involving the circumflex artery totally occluded right with calcification and filling of the posterior descending artery from the left-to-right collaterals.  Significant Labs:  CBC:   Recent Labs   Lab 07/13/22  0621   WBC 7.95   RBC 2.84*   HGB 8.9*   HCT 28.6*      *   MCH 31.3*   MCHC 31.1*     CMP:   Recent Labs   Lab 07/12/22  1048 07/12/22  2020 07/13/22  0954   *   < > 202*   CALCIUM 9.2   < > 8.4*   ALBUMIN 3.5  --   --    PROT  7.3  --   --       < > 135*   K 6.1*   < > 3.7   CO2 12*   < > 24      < > 95   BUN 78*   < > 44*   CREATININE 7.1*   < > 4.5*   ALKPHOS 115  --   --    ALT 90*  --   --    *  --   --    BILITOT 0.6  --   --     < > = values in this interval not displayed.     Coagulation:   Recent Labs   Lab 07/12/22  1048 07/12/22  1858 07/13/22  0200   INR 1.0  --   --    APTT 24.4   < > 39.2*    < > = values in this interval not displayed.       Significant Diagnostics:  CXR: I have reviewed all pertinent results/findings within the past 24 hours  Echo: I have reviewed all pertinent results/findings within the past 24 hours  EKG: I have reviewed all pertinent results/findings within the past 24 hours    Assessment/Plan:   77-year-old female with a history of triple-vessel coronary artery disease InStent restenoses end-stage renal disease type 1 diabetes mellitus hypertension hyperlipidemia admitted with the heart failure symptoms.  Stop the Plavix  Patient started on heparin drip  Hemodialysis to attain and negative fluid balance and to treat fluid overload  CT scan of the chest  Transfusion for anemia  I discussed the risks benefits and alternatives of the procedure the patient agreed to proceed  We will wait for the Plavix washout and proceed with surgery on Monday.  NYHA Score: NYHA III: marked limitation of physical activity, comfortable at rest    Active Diagnoses:    Diagnosis Date Noted POA    PRINCIPAL PROBLEM:  NSTEMI (non-ST elevated myocardial infarction) [I21.4] 07/12/2022 Yes    DKA (diabetic ketoacidosis) [E11.10] 07/12/2022 Yes    Hyperkalemia [E87.5] 07/12/2022 Yes    Metabolic acidosis [E87.2] 07/12/2022 Yes    ESRD (end stage renal disease) [N18.6]  Yes    Hypertension associated with diabetes [E11.59, I15.2]  Yes     Chronic      Problems Resolved During this Admission:       Thank you for your consult. I will follow-up with patient. Please contact us if you have any additional  questions.    Sanju Locke MD  Cardiothoracic Surgery  [facility]

## 2022-07-13 NOTE — ASSESSMENT & PLAN NOTE
Cont asa , bb and statin  S/P LHC which show multiple vessel ds   CVT consulted . Plan for CABG Monday

## 2022-07-13 NOTE — PLAN OF CARE
[facility]  Initial Discharge Assessment       Primary Care Provider: Rose Parks MD    Admission Diagnosis: Hyperkalemia [E87.5]  ESRD (end stage renal disease) [N18.6]  Elevated troponin [R77.8]  NSTEMI (non-ST elevated myocardial infarction) [I21.4]  Elevated troponin I level [R77.8]  Chest pain [R07.9]  Diabetic ketoacidosis without coma associated with other specified diabetes mellitus [E13.10]  Congestive heart failure, unspecified HF chronicity, unspecified heart failure type [I50.9]  Coronary artery disease involving native heart, unspecified vessel or lesion type, unspecified whether angina present [I25.10]    Admission Date: 7/12/2022  Expected Discharge Date:     Discharge Barriers Identified: None    Payor: [employer] MEDICARE / Plan: [employer] (SPECIAL NEEDS PLAN) / Product Type: Medicare Advantage /     Extended Emergency Contact Information  Primary Emergency Contact: Elizabeth Monzon  Address: [address] [place] [address] [id number] [place] [address]  Home Phone: [phone]  Mobile Phone: [phone]  Relation: Daughter  Secondary Emergency Contact: Myriam Don   [place]  Mobile Phone: [phone]  Relation: Sister    Discharge Plan A: Home with family  Discharge Plan B: Home with family      [facility] [address] [facility] [place] - [address]  [address]  [facility] [address]  Phone: [phone] Fax: [phone]    [address], [place] - [address] [facility]  [address] [facility] [address]  Phone: [phone] Fax: [phone]    [facility] [address] [facility], [place] - [address]  [address]  [facility] [address]  Phone: [phone] Fax: [phone]    Swer spoke with pt for initial assessment. Swer explained role of discharge planner. Pt lives with daughter and reported being independent with ADLs prior to admission. Pt's daughter is help  at home and will provide transportation at discharge. Pt denied using any medical equipment or having home health in the past. Pt goes to dialysis at [facility] on [address] MWF. Pt's family provide transportation to medical appointments. Pt does not have an advanced directive at this time. SWer provided a transitional care folder, information on advanced directives, information on pharmacy bedside delivery, and discharge planning begins on admission with contact information for any needs/questions.  CM needs to be determined based on hospital progress.     Initial Assessment (most recent)     Adult Discharge Assessment - 07/13/22 1027        Discharge Assessment    Assessment Type Discharge Planning Assessment     Confirmed/corrected address, phone number and insurance Yes     Confirmed Demographics Correct on Facesheet     Source of Information patient     If unable to respond/provide information was family/caregiver contacted? Yes     Contact Name/Number Elizabeth Monzon (daughter) [phone]     Communicated MARIBELL with patient/caregiver Date not available/Unable to determine     Reason For Admission NSTEMI     Lives With child(robert), adult     Facility Arrived From: home     Do you expect to return to your current living situation? Yes     Do you have help at home or someone to help you manage your care at home? Yes     Who are your caregiver(s) and their phone number(s)? family     Prior to hospitilization cognitive status: Alert/Oriented     Current cognitive status: Alert/Oriented     Walking or Climbing Stairs Difficulty none     Dressing/Bathing Difficulty none     Home Accessibility wheelchair accessible     Home Layout Able to live on 1st floor     Equipment Currently Used at Home none     Readmission within 30 days? No     Patient currently being followed by outpatient case management? No     Do you currently have service(s) that help you manage your care at home? No     Do you take prescription medications? Yes      Do you have prescription coverage? Yes     Do you have any problems affording any of your prescribed medications? No     Is the patient taking medications as prescribed? yes     Who is going to help you get home at discharge? family     How do you get to doctors appointments? health plan transportation;family or friend will provide     Are you on dialysis? Yes     Dialysis Name and Scheduled days [facility] [address] MWF @ 6:00am     Discharge Plan A Home with family     Discharge Plan B Home with family     DME Needed Upon Discharge  none     Discharge Plan discussed with: Patient     Discharge Barriers Identified None

## 2022-07-13 NOTE — PROGRESS NOTES
[facility] Medicine  Progress Note    Patient Name: Avril Monzon  MRN: [id number]  Patient Class: IP- Inpatient   Admission Date: 7/12/2022  Length of Stay: 1 days  Attending Physician: Suhas Prakash, *  Primary Care Provider: Rose Parks MD        Subjective:     Principal Problem:NSTEMI (non-ST elevated myocardial infarction)        HPI:  75 y/o female with a pmh of ESRD on HD, DM2, hyperlipidemia,CAD , Tobacco abuse , HTN   and h/o CVA who was brought to ED by family today due to chest pain  for the last 4 days which has gotten worse .  It is located  in the midsternum , intermittent ,  Pressure like pain ,  no radiation , rated 6/10  associated sx son  and nausea . She was in her normal health of states before Friday . She denies  any  fever , chills , diarrhea , Cough , HA  or  sx . She report missed HD yesterday due to  not feeling well . She went to Urgent care  yesterday , was dx with sinus infection  and d/c home .  ER COURSE:  K 6.1 , Co2 12 , Anion gap 23 , BUN/Cr 78/7.1 ,  , Troponin 12 , BHB 1.8 . CXR show pulmonary congestion . ABG show a PH 7.297   Cardiology and Nephrology consulted from the  ER . Started on insulin and heparin drip   ER VS:   BP Pulse Resp Temp SpO2   106/76 72 16 98 °F (36.7 °C) 96 %   CODE Status : Full code  Pt will be admitted to inpt with a dx of NSTEMI and DKA      Overview/Hospital Course:  76 y/o AAF admitted to ICU with a dx of DKA and NSTEMI . Started on Insulin drio , heparin drip, plavix , asa , BB and statin.  [facility] Cardiology consulted and performed a LHC which show a multivessel ds .  CVT consulted and plan for CABG Monday .      Interval History:     Review of Systems   Constitutional:  Positive for activity change and fatigue.   HENT: Negative.     Eyes: Negative.    Respiratory:  Negative for shortness of breath.    Cardiovascular:  Negative for chest pain.   Gastrointestinal:   Negative for nausea and vomiting.   Endocrine: Negative.    Genitourinary: Negative.    Musculoskeletal: Negative.    Skin: Negative.    Allergic/Immunologic: Negative.    Neurological: Negative.    Hematological: Negative.    Psychiatric/Behavioral: Negative.     Objective:     Vital Signs (Most Recent):  Temp: 98.7 °F (37.1 °C) (07/13/22 1549)  Pulse: 72 (07/13/22 1549)  Resp: 18 (07/13/22 1549)  BP: (!) 96/54 (07/13/22 1549)  SpO2: 97 % (07/13/22 1549)   Vital Signs (24h Range):  Temp:  [97.8 °F (36.6 °C)-99.3 °F (37.4 °C)] 98.7 °F (37.1 °C)  Pulse:  [70-80] 72  Resp:  [17-25] 18  SpO2:  [94 %-100 %] 97 %  BP: ()/(54-70) 96/54     Weight: 61.7 kg (136 lb)  Body mass index is 22.63 kg/m².    Intake/Output Summary (Last 24 hours) at 7/13/2022 1641  Last data filed at 7/13/2022 0732  Gross per 24 hour   Intake 1162.37 ml   Output 1000 ml   Net 162.37 ml      Physical Exam  Vitals and nursing note reviewed.   Constitutional:       Appearance: She is ill-appearing.   HENT:      Head: Normocephalic and atraumatic.      Nose: Nose normal.      Mouth/Throat:      Mouth: Mucous membranes are moist.   Eyes:      Extraocular Movements: Extraocular movements intact.      Conjunctiva/sclera: Conjunctivae normal.      Pupils: Pupils are equal, round, and reactive to light.   Cardiovascular:      Rate and Rhythm: Normal rate and regular rhythm.      Pulses: Normal pulses.      Heart sounds: No murmur heard.    No friction rub.   Pulmonary:      Effort: Pulmonary effort is normal. No respiratory distress.      Breath sounds: No stridor. No wheezing, rhonchi or rales.   Abdominal:      General: Abdomen is flat. Bowel sounds are normal. There is no distension.      Palpations: There is no mass.      Tenderness: There is no abdominal tenderness. There is no guarding or rebound.      Hernia: No hernia is present.   Musculoskeletal:         General: No swelling, tenderness, deformity or signs of injury. Normal range of motion.       Cervical back: Normal range of motion and neck supple. No rigidity or tenderness.   Skin:     General: Skin is warm.      Capillary Refill: Capillary refill takes less than 2 seconds.      Coloration: Skin is not jaundiced.      Findings: No bruising.   Neurological:      General: No focal deficit present.      Mental Status: She is alert.      Cranial Nerves: No cranial nerve deficit.      Sensory: No sensory deficit.      Motor: No weakness.      Coordination: Coordination normal.   Psychiatric:         Mood and Affect: Mood normal.       Significant Labs: All pertinent labs within the past 24 hours have been reviewed.      Significant Imaging: I have reviewed all pertinent imaging results/findings within the past 24 hours.        Assessment/Plan:      * NSTEMI (non-ST elevated myocardial infarction)  Cardiology consulted  Cont Heparin drip , asa , plavix and BB  TTE  Trend troponin level   S/P LHC which multiple vessel ds   CVT consulted     Metabolic acidosis  Cont HD  Cont insulin drip       Hyperkalemia  Cont HD      DKA (diabetic ketoacidosis)  Start DKA protocol   Cont insulin drip   Monitor  Electrolytes and CBG    RESOLVED  Cont long and short acting insulin   Keep CBG < 180         CAD (coronary artery disease)  Cont asa , bb and statin  S/P LHC which show multiple vessel ds   CVT consulted . Plan for CABG Monday       ESRD (end stage renal disease)    Nephrology consulted   HD per nephrology     Hypertension associated with diabetes  Cont BB          VTE Risk Mitigation (From admission, onward)         Ordered     IP VTE HIGH RISK PATIENT  Once         07/12/22 1637     Place DYANA hose  Until discontinued         07/12/22 1637     Place sequential compression device  Until discontinued         07/12/22 1637     Place sequential compression device  Until discontinued         07/12/22 1216                Discharge Planning   MARIBELL:      Code Status: Full Code   Is the patient medically ready for  discharge?:     Reason for patient still in hospital (select all that apply): Treatment  Discharge Plan A: Home with family                  Suhas Prakash MD  Department of Hospital Medicine   '[facility] S[facility]

## 2022-07-13 NOTE — HOSPITAL COURSE
76 y/o AAF admitted to ICU with a dx of DKA and NSTEMI . Started on Insulin drio , heparin drip, plavix , asa , BB and statin.  Subsequently DKA resolved and transfer to sc inulin . Cardiology consulted and performed a LHC which show a multivessel ds .  CVT consulted and plan for CABG Monday. As of 7/14/22 patient seen and examined in room, lying in bed. HD in progress. Denies any cardiac complaints today. Awaiting CABG on Monday.   7/15 awaiting cabg on Monday per CTS. Denies chest pain, dyspnea. On hd MWF  7/16 awaiting cabg on Monday per CTS. Indigestion persists, characterized as tightness upper abdomen after eating, modestly improved with famotidine. Pmh gerd. Consider adding proton pump inhibitor. Denies chest pain, dyspnea.   7/17 tolerating dialysis this am. Naeon. Indigestion improved. Denies chest pain, dyspnea, fever. Cabg tomorrow. Npo after mn  7/18 cabg canceled today due to equipment shortage. Otherwise no complaints.  7/19 NAEON. Awaiting cabg tomorrow  7/20 status post cabg. Transferred to icu intubated, sedated.

## 2022-07-14 PROBLEM — E87.5 HYPERKALEMIA: Status: RESOLVED | Noted: 2022-07-12 | Resolved: 2022-07-14

## 2022-07-14 LAB
ANION GAP SERPL CALC-SCNC: 14 MMOL/L (ref 8–16)
ANION GAP SERPL CALC-SCNC: 14 MMOL/L (ref 8–16)
ANION GAP SERPL CALC-SCNC: 15 MMOL/L (ref 8–16)
ANION GAP SERPL CALC-SCNC: 16 MMOL/L (ref 8–16)
ANION GAP SERPL CALC-SCNC: 19 MMOL/L (ref 8–16)
BASOPHILS # BLD AUTO: 0.02 K/UL (ref 0–0.2)
BASOPHILS # BLD AUTO: 0.02 K/UL (ref 0–0.2)
BASOPHILS NFR BLD: 0.3 % (ref 0–1.9)
BASOPHILS NFR BLD: 0.3 % (ref 0–1.9)
BRPFT: ABNORMAL
BUN SERPL-MCNC: 23 MG/DL (ref 8–23)
BUN SERPL-MCNC: 26 MG/DL (ref 8–23)
BUN SERPL-MCNC: 29 MG/DL (ref 8–23)
BUN SERPL-MCNC: 54 MG/DL (ref 8–23)
BUN SERPL-MCNC: 64 MG/DL (ref 8–23)
CALCIUM SERPL-MCNC: 8.4 MG/DL (ref 8.7–10.5)
CALCIUM SERPL-MCNC: 8.4 MG/DL (ref 8.7–10.5)
CALCIUM SERPL-MCNC: 8.5 MG/DL (ref 8.7–10.5)
CALCIUM SERPL-MCNC: 8.6 MG/DL (ref 8.7–10.5)
CALCIUM SERPL-MCNC: 8.9 MG/DL (ref 8.7–10.5)
CATH EF QUANTITATIVE: 40 %
CHLORIDE SERPL-SCNC: 95 MMOL/L (ref 95–110)
CHLORIDE SERPL-SCNC: 96 MMOL/L (ref 95–110)
CHLORIDE SERPL-SCNC: 99 MMOL/L (ref 95–110)
CO2 SERPL-SCNC: 15 MMOL/L (ref 23–29)
CO2 SERPL-SCNC: 21 MMOL/L (ref 23–29)
CO2 SERPL-SCNC: 21 MMOL/L (ref 23–29)
CO2 SERPL-SCNC: 23 MMOL/L (ref 23–29)
CO2 SERPL-SCNC: 24 MMOL/L (ref 23–29)
CREAT SERPL-MCNC: 3.1 MG/DL (ref 0.5–1.4)
CREAT SERPL-MCNC: 3.7 MG/DL (ref 0.5–1.4)
CREAT SERPL-MCNC: 4 MG/DL (ref 0.5–1.4)
CREAT SERPL-MCNC: 5.3 MG/DL (ref 0.5–1.4)
CREAT SERPL-MCNC: 5.5 MG/DL (ref 0.5–1.4)
DIFFERENTIAL METHOD: ABNORMAL
DIFFERENTIAL METHOD: ABNORMAL
EOSINOPHIL # BLD AUTO: 0 K/UL (ref 0–0.5)
EOSINOPHIL # BLD AUTO: 0 K/UL (ref 0–0.5)
EOSINOPHIL NFR BLD: 0.3 % (ref 0–8)
EOSINOPHIL NFR BLD: 0.3 % (ref 0–8)
ERYTHROCYTE [DISTWIDTH] IN BLOOD BY AUTOMATED COUNT: 14.2 % (ref 11.5–14.5)
ERYTHROCYTE [DISTWIDTH] IN BLOOD BY AUTOMATED COUNT: 14.2 % (ref 11.5–14.5)
EST. GFR  (AFRICAN AMERICAN): 12 ML/MIN/1.73 M^2
EST. GFR  (AFRICAN AMERICAN): 13 ML/MIN/1.73 M^2
EST. GFR  (AFRICAN AMERICAN): 16 ML/MIN/1.73 M^2
EST. GFR  (AFRICAN AMERICAN): 8 ML/MIN/1.73 M^2
EST. GFR  (AFRICAN AMERICAN): 8 ML/MIN/1.73 M^2
EST. GFR  (NON AFRICAN AMERICAN): 10 ML/MIN/1.73 M^2
EST. GFR  (NON AFRICAN AMERICAN): 11 ML/MIN/1.73 M^2
EST. GFR  (NON AFRICAN AMERICAN): 14 ML/MIN/1.73 M^2
EST. GFR  (NON AFRICAN AMERICAN): 7 ML/MIN/1.73 M^2
EST. GFR  (NON AFRICAN AMERICAN): 7 ML/MIN/1.73 M^2
FEF 25 75 LLN: 0.54
FEF 25 75 PRE REF: 79.1 %
FEF 25 75 REF: 1.52
FEV1 FVC LLN: 64
FEV1 FVC PRE REF: 107.5 %
FEV1 FVC REF: 78
FEV1 LLN: 1.25
FEV1 PRE REF: 66.1 %
FEV1 REF: 1.83
FVC LLN: 1.64
FVC PRE REF: 60.9 %
FVC REF: 2.37
GLUCOSE SERPL-MCNC: 148 MG/DL (ref 70–110)
GLUCOSE SERPL-MCNC: 184 MG/DL (ref 70–110)
GLUCOSE SERPL-MCNC: 204 MG/DL (ref 70–110)
GLUCOSE SERPL-MCNC: 218 MG/DL (ref 70–110)
GLUCOSE SERPL-MCNC: 257 MG/DL (ref 70–110)
HCT VFR BLD AUTO: 28.4 % (ref 37–48.5)
HCT VFR BLD AUTO: 29.3 % (ref 37–48.5)
HGB BLD-MCNC: 9.4 G/DL (ref 12–16)
HGB BLD-MCNC: 9.5 G/DL (ref 12–16)
IMM GRANULOCYTES # BLD AUTO: 0.02 K/UL (ref 0–0.04)
IMM GRANULOCYTES # BLD AUTO: 0.02 K/UL (ref 0–0.04)
IMM GRANULOCYTES NFR BLD AUTO: 0.3 % (ref 0–0.5)
IMM GRANULOCYTES NFR BLD AUTO: 0.3 % (ref 0–0.5)
LYMPHOCYTES # BLD AUTO: 1 K/UL (ref 1–4.8)
LYMPHOCYTES # BLD AUTO: 1.1 K/UL (ref 1–4.8)
LYMPHOCYTES NFR BLD: 15.5 % (ref 18–48)
LYMPHOCYTES NFR BLD: 16.2 % (ref 18–48)
MCH RBC QN AUTO: 31.3 PG (ref 27–31)
MCH RBC QN AUTO: 32.1 PG (ref 27–31)
MCHC RBC AUTO-ENTMCNC: 32.1 G/DL (ref 32–36)
MCHC RBC AUTO-ENTMCNC: 33.5 G/DL (ref 32–36)
MCV RBC AUTO: 96 FL (ref 82–98)
MCV RBC AUTO: 98 FL (ref 82–98)
MONOCYTES # BLD AUTO: 0.4 K/UL (ref 0.3–1)
MONOCYTES # BLD AUTO: 0.5 K/UL (ref 0.3–1)
MONOCYTES NFR BLD: 6.4 % (ref 4–15)
MONOCYTES NFR BLD: 7.3 % (ref 4–15)
NEUTROPHILS # BLD AUTO: 5 K/UL (ref 1.8–7.7)
NEUTROPHILS # BLD AUTO: 5.1 K/UL (ref 1.8–7.7)
NEUTROPHILS NFR BLD: 76.3 % (ref 38–73)
NEUTROPHILS NFR BLD: 76.5 % (ref 38–73)
NRBC BLD-RTO: 0 /100 WBC
NRBC BLD-RTO: 0 /100 WBC
PEF LLN: 2.5
PEF PRE REF: 58 %
PEF REF: 4.57
PHOSPHATE SERPL-MCNC: 7.1 MG/DL (ref 2.7–4.5)
PLATELET # BLD AUTO: 183 K/UL (ref 150–450)
PLATELET # BLD AUTO: 192 K/UL (ref 150–450)
PMV BLD AUTO: 10.1 FL (ref 9.2–12.9)
PMV BLD AUTO: 9.7 FL (ref 9.2–12.9)
POCT GLUCOSE: 123 MG/DL (ref 70–110)
POCT GLUCOSE: 144 MG/DL (ref 70–110)
POCT GLUCOSE: 201 MG/DL (ref 70–110)
POCT GLUCOSE: 214 MG/DL (ref 70–110)
POCT GLUCOSE: 233 MG/DL (ref 70–110)
POCT GLUCOSE: 277 MG/DL (ref 70–110)
POTASSIUM SERPL-SCNC: 3.9 MMOL/L (ref 3.5–5.1)
POTASSIUM SERPL-SCNC: 3.9 MMOL/L (ref 3.5–5.1)
POTASSIUM SERPL-SCNC: 4 MMOL/L (ref 3.5–5.1)
POTASSIUM SERPL-SCNC: 4.1 MMOL/L (ref 3.5–5.1)
POTASSIUM SERPL-SCNC: 4.3 MMOL/L (ref 3.5–5.1)
PRE FEF 25 75: 1.21 L/S (ref 0.54–3.09)
PRE FET 100: 1.6 SEC
PRE FEV1 FVC: 83.59 % (ref 64.18–89.6)
PRE FEV1: 1.21 L (ref 1.25–2.39)
PRE FVC: 1.45 L (ref 1.64–3.15)
PRE PEF: 2.65 L/S (ref 2.5–6.64)
RBC # BLD AUTO: 2.96 M/UL (ref 4–5.4)
RBC # BLD AUTO: 3 M/UL (ref 4–5.4)
SODIUM SERPL-SCNC: 131 MMOL/L (ref 136–145)
SODIUM SERPL-SCNC: 133 MMOL/L (ref 136–145)
SODIUM SERPL-SCNC: 134 MMOL/L (ref 136–145)
WBC # BLD AUTO: 6.56 K/UL (ref 3.9–12.7)
WBC # BLD AUTO: 6.73 K/UL (ref 3.9–12.7)

## 2022-07-14 PROCEDURE — 36415 COLL VENOUS BLD VENIPUNCTURE: CPT | Performed by: INTERNAL MEDICINE

## 2022-07-14 PROCEDURE — 85025 COMPLETE CBC W/AUTO DIFF WBC: CPT | Performed by: INTERNAL MEDICINE

## 2022-07-14 PROCEDURE — 90935 HEMODIALYSIS ONE EVALUATION: CPT | Mod: ,,, | Performed by: INTERNAL MEDICINE

## 2022-07-14 PROCEDURE — C9399 UNCLASSIFIED DRUGS OR BIOLOG: HCPCS | Performed by: NURSE PRACTITIONER

## 2022-07-14 PROCEDURE — 25000003 PHARM REV CODE 250: Performed by: INTERNAL MEDICINE

## 2022-07-14 PROCEDURE — 84100 ASSAY OF PHOSPHORUS: CPT | Performed by: INTERNAL MEDICINE

## 2022-07-14 PROCEDURE — 94010 BREATHING CAPACITY TEST: CPT

## 2022-07-14 PROCEDURE — 80048 BASIC METABOLIC PNL TOTAL CA: CPT | Mod: 91 | Performed by: INTERNAL MEDICINE

## 2022-07-14 PROCEDURE — 99232 SBSQ HOSP IP/OBS MODERATE 35: CPT | Mod: ,,, | Performed by: INTERNAL MEDICINE

## 2022-07-14 PROCEDURE — 90935 PR HEMODIALYSIS, ONE EVALUATION: ICD-10-PCS | Mod: ,,, | Performed by: INTERNAL MEDICINE

## 2022-07-14 PROCEDURE — 25000003 PHARM REV CODE 250: Performed by: NURSE PRACTITIONER

## 2022-07-14 PROCEDURE — 99232 SBSQ HOSP IP/OBS MODERATE 35: CPT | Mod: ,,, | Performed by: NURSE PRACTITIONER

## 2022-07-14 PROCEDURE — 94761 N-INVAS EAR/PLS OXIMETRY MLT: CPT

## 2022-07-14 PROCEDURE — 99232 PR SUBSEQUENT HOSPITAL CARE,LEVL II: ICD-10-PCS | Mod: ,,, | Performed by: INTERNAL MEDICINE

## 2022-07-14 PROCEDURE — 99232 PR SUBSEQUENT HOSPITAL CARE,LEVL II: ICD-10-PCS | Mod: ,,, | Performed by: NURSE PRACTITIONER

## 2022-07-14 PROCEDURE — 21400001 HC TELEMETRY ROOM

## 2022-07-14 PROCEDURE — 85025 COMPLETE CBC W/AUTO DIFF WBC: CPT | Mod: 91 | Performed by: EMERGENCY MEDICINE

## 2022-07-14 PROCEDURE — 99900035 HC TECH TIME PER 15 MIN (STAT)

## 2022-07-14 PROCEDURE — 80100014 HC HEMODIALYSIS 1:1

## 2022-07-14 RX ORDER — MUPIROCIN 20 MG/G
OINTMENT TOPICAL 2 TIMES DAILY
Status: DISPENSED | OUTPATIENT
Start: 2022-07-14 | End: 2022-07-19

## 2022-07-14 RX ADMIN — INSULIN DETEMIR 6 UNITS: 100 INJECTION, SOLUTION SUBCUTANEOUS at 09:07

## 2022-07-14 RX ADMIN — METOPROLOL TARTRATE 50 MG: 50 TABLET, FILM COATED ORAL at 12:07

## 2022-07-14 RX ADMIN — METOPROLOL TARTRATE 50 MG: 50 TABLET, FILM COATED ORAL at 09:07

## 2022-07-14 RX ADMIN — INSULIN ASPART 1 UNITS: 100 INJECTION, SOLUTION INTRAVENOUS; SUBCUTANEOUS at 09:07

## 2022-07-14 RX ADMIN — MUPIROCIN: 20 OINTMENT TOPICAL at 09:07

## 2022-07-14 RX ADMIN — ASPIRIN 81 MG: 81 TABLET, COATED ORAL at 12:07

## 2022-07-14 RX ADMIN — FAMOTIDINE 20 MG: 20 TABLET ORAL at 12:07

## 2022-07-14 RX ADMIN — INSULIN ASPART 2 UNITS: 100 INJECTION, SOLUTION INTRAVENOUS; SUBCUTANEOUS at 05:07

## 2022-07-14 RX ADMIN — DOCUSATE SODIUM AND SENNOSIDES 2 TABLET: 8.6; 5 TABLET, FILM COATED ORAL at 12:07

## 2022-07-14 RX ADMIN — ACETAMINOPHEN 650 MG: 325 TABLET ORAL at 09:07

## 2022-07-14 NOTE — NURSING
Chart reviewed for diabetes  Patient has been seen by this nurse on previous admit  No further action

## 2022-07-14 NOTE — HOSPITAL COURSE
7/14/2022--Patient seen and examined in room, lying in bed. HD in progress. Denies any cardiac complaints today. Awaiting CABG on Monday.     7/15/2022--patient seen and examined in room, denies any complaints on exam today.     7/17/22 No c/o on HD on exam. Patient denies CP, angina or anginal equivalent. CABG tomorrow. Start statin after cabg, continue b blockers    7/18/2022 stable no chest pain . Cabg on hold due equipment need for off pump bypass    7/21/22-Patient seen and examined today, s/p CABG x 2. Increased OP from chest tubes overnight, H/H dipped to 4.3/12.0, being transfused. On pressor support, undergoing CRRT.     7/22/22-Patient seen and examined today, recovering s/p CABG x 2. Extubated, remains weak, complains of nausea and incisional pain. Pressors being weaned. Labs reviewed. H/H 8.9/25.2. Platelets 109,000.    7/23/2022 UP IN CHAIR TODAY NO PAIN NEEDING HEMODIALYSIS. STILL HAS DRAINAGE FROM CHEST TUBE NON FROM MEDIASTINAL TUBE. SHE IS PAIN FREE ALERT ORIENTED. NO ARRYTHMIAS.     7/24/2022 TOLERATED DIALYSIS WELL FEELS WELL NO CHEST PAIN NO ARRYTHMIAS CHEST TUIBE REMOVED.    7/25/22-Patient seen and examined today, sitting up in bed during dialysis. Stable. No CV complaints. Denies chest pain/SOB. Labs stable.     7/26/22-Patient seen and examined today, sitting up in bedside chair. Continues to progress. Worked with PT/OT earlier, ambulated well. No CV complaints. Labs stable. Likely d/c home tmw per CTS.    7/27/22-Patient seen and examined today, sitting up in bedside chair. Uneventful night. Stable CV wise, being discharged home today.

## 2022-07-14 NOTE — SUBJECTIVE & OBJECTIVE
Review of Systems   Constitutional:  Positive for activity change and fatigue.   HENT: Negative.     Eyes: Negative.    Respiratory:  Negative for shortness of breath.    Cardiovascular:  Negative for chest pain.   Gastrointestinal:  Negative for nausea and vomiting.   Endocrine: Negative.    Genitourinary: Negative.    Musculoskeletal: Negative.    Skin: Negative.    Allergic/Immunologic: Negative.    Neurological: Negative.    Hematological: Negative.    Psychiatric/Behavioral: Negative.     Objective:     Vital Signs (Most Recent):  Temp: 97.5 °F (36.4 °C) (07/14/22 1234)  Pulse: 79 (07/14/22 1234)  Resp: 20 (07/14/22 1234)  BP: (!) 128/59 (07/14/22 1234)  SpO2: 96 % (07/14/22 1234)   Vital Signs (24h Range):  Temp:  [97.5 °F (36.4 °C)-99.7 °F (37.6 °C)] 97.5 °F (36.4 °C)  Pulse:  [69-82] 79  Resp:  [17-20] 20  SpO2:  [90 %-97 %] 96 %  BP: ()/(54-78) 128/59     Weight: 61.7 kg (136 lb)  Body mass index is 22.63 kg/m².    Intake/Output Summary (Last 24 hours) at 7/14/2022 1537  Last data filed at 7/14/2022 1400  Gross per 24 hour   Intake 1195.11 ml   Output 1000 ml   Net 195.11 ml      Physical Exam  Vitals and nursing note reviewed.   Constitutional:       Appearance: She is ill-appearing.   HENT:      Head: Normocephalic and atraumatic.      Nose: Nose normal.      Mouth/Throat:      Mouth: Mucous membranes are moist.   Eyes:      Extraocular Movements: Extraocular movements intact.      Conjunctiva/sclera: Conjunctivae normal.      Pupils: Pupils are equal, round, and reactive to light.   Cardiovascular:      Rate and Rhythm: Normal rate and regular rhythm.      Pulses: Normal pulses.      Heart sounds: No murmur heard.    No friction rub.   Pulmonary:      Effort: Pulmonary effort is normal. No respiratory distress.      Breath sounds: No stridor. No wheezing, rhonchi or rales.   Abdominal:      General: Abdomen is flat. Bowel sounds are normal. There is no distension.      Palpations: There is no  mass.      Tenderness: There is no abdominal tenderness. There is no guarding or rebound.      Hernia: No hernia is present.   Musculoskeletal:         General: No swelling, tenderness, deformity or signs of injury. Normal range of motion.      Cervical back: Normal range of motion and neck supple. No rigidity or tenderness.   Skin:     General: Skin is warm.      Capillary Refill: Capillary refill takes less than 2 seconds.      Coloration: Skin is not jaundiced.      Findings: No bruising.   Neurological:      General: No focal deficit present.      Mental Status: She is alert.      Cranial Nerves: No cranial nerve deficit.      Sensory: No sensory deficit.      Motor: No weakness.      Coordination: Coordination normal.   Psychiatric:         Mood and Affect: Mood normal.       Significant Labs: All pertinent labs within the past 24 hours have been reviewed.  BMP:   Recent Labs   Lab 07/12/22  2020 07/13/22  0200 07/14/22  1331   *   < > 204*      < > 133*   K 3.8   < > 4.1   CL 95   < > 99   CO2 26   < > 15*   BUN 35*   < > 23   CREATININE 3.6*   < > 3.1*   CALCIUM 9.3   < > 8.6*   MG 2.1  --   --     < > = values in this interval not displayed.     CBC:   Recent Labs   Lab 07/13/22  0200 07/13/22  0621 07/14/22  0524   WBC 7.94 7.95 6.56  6.73   HGB 8.1* 8.9* 9.4*  9.5*   HCT 24.7* 28.6* 29.3*  28.4*   * 184 192  183     CMP:   Recent Labs   Lab 07/14/22  0120 07/14/22  0524 07/14/22  1331   * 134* 133*   K 3.9 4.0 4.1   CL 96 95 99   CO2 21* 24 15*   * 148* 204*   BUN 54* 64* 23   CREATININE 5.3* 5.5* 3.1*   CALCIUM 8.4* 8.5* 8.6*   ANIONGAP 16 15 19*   EGFRNONAA 7* 7* 14*       Significant Imaging: I have reviewed all pertinent imaging results/findings within the past 24 hours.

## 2022-07-14 NOTE — ASSESSMENT & PLAN NOTE
Cardiology consulted  Cont Heparin drip , asa , plavix and BB  TTE  Trend troponin level   S/P LHC which multiple vessel ds   CVT consulted     Plan for CABG on Monday

## 2022-07-14 NOTE — PROGRESS NOTES
RT went to bedside to do bedside annette for upcoming heart surgery. Patient is being dialyzed at bedside at this time. Unable to perform now, but will try later when done.

## 2022-07-14 NOTE — CONSULTS
[facility] - Emergency Dept.  Nephrology  Consult Note    Patient Name: Avril Monzon  MRN: [id number]  Admission Date: 7/12/2022  Hospital Length of Stay: 2 days  Attending Provider: Suhas Prakash, *   Primary Care Physician: Rose Parks MD  Principal Problem:NSTEMI (non-ST elevated myocardial infarction)    Consults  Subjective:     HPI:     77-year-old female with history of end-stage renal disease.  Presented to the emergency department with chest pain and shortness of breath.  She did not have dialysis yesterday due to not feeling well.  Nephrology has been consulted for dialysis maintenance.  Patient was seen in the emergency department.  In bed resting comfortably.  No acute distress noted.  States that she is feeling better since she arrived in the emergency department.  She still has some shortness of breath however.  Her chest discomfort has improved.    07/13/2022:  Patient was seen in her hospital room.  In bed resting comfortably.  No acute distress noted.  States that she is feeling better since having dialysis yesterday.  No longer has chest discomfort or shortness of breath.    07/14/2022:  Patient was seen on dialysis.  In bed resting comfortably.  No acute distress noted.    Past Medical History:   Diagnosis Date    Acute hypoxemic respiratory failure 11/26/2018    Anemia     Arthritis     back, hand, knees    Back pain     Cataract     CKD stage G4/A3, GFR 15 - 29 and albumin creatinine ratio >300 mg/g     GFR 40% Jan 2014 and 33% ub 3/2014 ([facility])    Coronary artery disease involving native coronary artery of native heart 11/30/2018    Diabetic retinopathy     DM (diabetes mellitus) type II controlled, neurological manifestation     Exudative age-related macular degeneration of left eye with active choroidal neovascularization 2/5/2019    GERD (gastroesophageal reflux disease)     Glaucoma     Heart failure     Hyperlipidemia     Hypertension     Non-ST elevation MI  (NSTEMI) 11/28/2018    NSTEMI (non-ST elevated myocardial infarction) 11/27/2018    Peripheral neuropathy     Polyneuropathy     Proteinuria     >6 mo     Seizures     [facility] 1/2014 -dick CT NRI showed small vessel    Stroke 2012,2014    Tobacco dependence     Type 2 diabetes with peripheral circulatory disorder, controlled        Past Surgical History:   Procedure Laterality Date    BREAST LUMPECTOMY Left     benign, when patient was 15 years old    BREAST MASS EXCISION      ,benign, age 15.    CATHETERIZATION OF BOTH LEFT AND RIGHT HEART N/A 11/28/2018    Procedure: CATHETERIZATION, HEART, BOTH LEFT AND RIGHT;  Surgeon: Michelle Holman MD;  Location: [facility] CATH LAB;  Service: Cardiology;  Laterality: N/A;    CATHETERIZATION OF BOTH LEFT AND RIGHT HEART N/A 11/30/2018    Procedure: CATHETERIZATION, HEART, BOTH LEFT AND RIGHT;  Surgeon: Michelle Holman MD;  Location: [facility] CATH LAB;  Service: Cardiology;  Laterality: N/A;    HYSTERECTOMY  1982    INSERTION OF SHUNT      LEFT HEART CATHETERIZATION Left 12/3/2018    Procedure: CATHETERIZATION, HEART, LEFT;  Surgeon: Michelle Holman MD;  Location: [facility] CATH LAB;  Service: Cardiology;  Laterality: Left;  LAD ATHERECTOMY STENT/ FEMORAL APPROACH    LEFT HEART CATHETERIZATION Left 7/13/2022    Procedure: CATHETERIZATION, HEART, LEFT;  Surgeon: Abhi Sloan MD;  Location: [facility] CATH LAB;  Service: Cardiology;  Laterality: Left;    OOPHORECTOMY      wrist cyst Right 1980s    dorsal wrist cyst       Review of patient's allergies indicates:   Allergen Reactions    Codeine Swelling    Darvon [propoxyphene] Swelling    Atorvastatin      Muscle twitching     Current Facility-Administered Medications   Medication Frequency    0.9%  NaCl infusion Continuous PRN    acetaminophen tablet 650 mg Q4H PRN    aspirin EC tablet 81 mg Daily    atropine injection 0.5 mg PRN    dextrose 10 % infusion PRN    dextrose 10 % infusion PRN    dextrose 10% bolus 125 mL  PRN    dextrose 10% bolus 250 mL PRN    famotidine tablet 20 mg Daily    glucagon (human recombinant) injection 1 mg PRN    glucose chewable tablet 16 g PRN    glucose chewable tablet 24 g PRN    insulin aspart U-100 pen 0-5 Units QID (AC + HS) PRN    insulin detemir U-100 pen 6 Units QHS    metoprolol tartrate (LOPRESSOR) tablet 50 mg BID    nitroGLYCERIN SL tablet 0.4 mg Q5 Min PRN    promethazine (PHENERGAN) 12.5 mg in dextrose 5 % 50 mL IVPB Q6H PRN    senna-docusate 8.6-50 mg per tablet 2 tablet Daily    sodium chloride 0.9% flush 10 mL PRN     Family History     Problem Relation (Age of Onset)    Cancer Maternal Grandmother    Diabetes Mother    Heart disease Mother    Hyperlipidemia Mother    Hypertension Mother    Muscular dystrophy Son        Tobacco Use    Smoking status: Former Smoker     Packs/day: 1.50     Years: 30.00     Pack years: 45.00     Types: Cigarettes     Quit date: 1990     Years since quittin.5    Smokeless tobacco: Former User   Substance and Sexual Activity    Alcohol use: No     Alcohol/week: 0.0 standard drinks    Drug use: No    Sexual activity: Not Currently     Review of Systems   Constitutional: Negative.    HENT: Negative.    Eyes: Negative.    Respiratory: Positive for shortness of breath.    Cardiovascular: Positive for chest pain.   Gastrointestinal: Negative.    Genitourinary: Negative.    Musculoskeletal: Negative.    Skin: Negative.    Neurological: Negative.      Objective:     Vital Signs (Most Recent):  Temp: 98.6 °F (37 °C) (22)  Pulse: 72 (22)  Resp: 18 (22)  BP: 126/78 (22)  SpO2: (!) 93 % (22)  O2 Device (Oxygen Therapy): room air (22) Vital Signs (24h Range):  Temp:  [97.7 °F (36.5 °C)-99.7 °F (37.6 °C)] 98.6 °F (37 °C)  Pulse:  [71-82] 72  Resp:  [17-19] 18  SpO2:  [90 %-97 %] 93 %  BP: ()/(54-78) 126/78     Weight: 61.7 kg (136 lb) (22 1400)  Body mass index is  22.63 kg/m².  Body surface area is 1.68 meters squared.    I/O last 3 completed shifts:  In: 1397.5 [I.V.:1397.5]  Out: -     Physical Exam  Constitutional:       Appearance: Normal appearance.   HENT:      Head: Normocephalic and atraumatic.   Eyes:      General: No scleral icterus.     Extraocular Movements: Extraocular movements intact.      Pupils: Pupils are equal, round, and reactive to light.   Cardiovascular:      Rate and Rhythm: Normal rate and regular rhythm.   Pulmonary:      Effort: Pulmonary effort is normal.      Breath sounds: Rales present.   Abdominal:      General: Abdomen is flat.      Palpations: There is no mass.   Musculoskeletal:      Right lower leg: No edema.      Left lower leg: No edema.   Skin:     General: Skin is warm and dry.   Neurological:      General: No focal deficit present.      Mental Status: She is alert and oriented to person, place, and time.   Psychiatric:         Mood and Affect: Mood normal.         Behavior: Behavior normal.         Significant Labs:  BMP:   Recent Labs   Lab 07/12/22 2020 07/13/22  0200 07/14/22  0524   *   < > 148*      < > 134*   K 3.8   < > 4.0   CL 95   < > 95   CO2 26   < > 24   BUN 35*   < > 64*   CREATININE 3.6*   < > 5.5*   CALCIUM 9.3   < > 8.5*   MG 2.1  --   --     < > = values in this interval not displayed.     CMP:   Recent Labs   Lab 07/12/22  1048 07/12/22 2020 07/14/22  0524   *   < > 148*   CALCIUM 9.2   < > 8.5*   ALBUMIN 3.5  --   --    PROT 7.3  --   --       < > 134*   K 6.1*   < > 4.0   CO2 12*   < > 24      < > 95   BUN 78*   < > 64*   CREATININE 7.1*   < > 5.5*   ALKPHOS 115  --   --    ALT 90*  --   --    *  --   --    BILITOT 0.6  --   --     < > = values in this interval not displayed.     All labs within the past 24 hours have been reviewed.    Significant Imaging:  Labs: Reviewed  Reviewed    Assessment/Plan:     Active Diagnoses:    Diagnosis Date Noted POA    PRINCIPAL PROBLEM:   NSTEMI (non-ST elevated myocardial infarction) [I21.4] 07/12/2022 Yes    DKA (diabetic ketoacidosis) [E11.10] 07/12/2022 Yes    Hyperkalemia [E87.5] 07/12/2022 Yes    Metabolic acidosis [E87.2] 07/12/2022 Yes    CAD (coronary artery disease) [I25.10] 11/30/2018 Yes    ESRD (end stage renal disease) [N18.6]  Yes    Hypertension associated with diabetes [E11.59, I15.2]  Yes     Chronic      Problems Resolved During this Admission:       1. End-stage renal disease:  Patient was seen on dialysis.  In bed resting comfortably.  No acute distress noted.    Plans for coronary artery bypass surgery noted for next Monday.  Will likely postpone dialysis on Saturday and dialyze Sunday so that she can be optimized prior to surgery.    2. Chest pain:  Coronary artery bypass surgery planned for next week.      Thank you for your consult.     Adrian Ramirez MD  Nephrology  [facility] - Emergency Dept.

## 2022-07-14 NOTE — PROGRESS NOTES
[facility] Medicine  Progress Note    Patient Name: Avril Monzon  MRN: [id number]  Patient Class: IP- Inpatient   Admission Date: 7/12/2022  Length of Stay: 2 days  Attending Physician: Suhas Prakash, *  Primary Care Provider: Rose Parks MD        Subjective:     Principal Problem:NSTEMI (non-ST elevated myocardial infarction)        HPI:  77 y/o female with a pmh of ESRD on HD, DM2, hyperlipidemia,CAD , Tobacco abuse , HTN   and h/o CVA who was brought to ED by family today due to chest pain  for the last 4 days which has gotten worse .  It is located  in the midsternum , intermittent ,  Pressure like pain ,  no radiation , rated 6/10  associated sx son  and nausea . She was in her normal health of states before Friday . She denies  any  fever , chills , diarrhea , Cough , HA  or  sx . She report missed HD yesterday due to  not feeling well . She went to Urgent care  yesterday , was dx with sinus infection  and d/c home .  ER COURSE:  K 6.1 , Co2 12 , Anion gap 23 , BUN/Cr 78/7.1 ,  , Troponin 12 , BHB 1.8 . CXR show pulmonary congestion . ABG show a PH 7.297   Cardiology and Nephrology consulted from the  ER . Started on insulin and heparin drip   ER VS:   BP Pulse Resp Temp SpO2   106/76 72 16 98 °F (36.7 °C) 96 %   CODE Status : Full code  Pt will be admitted to inpt with a dx of NSTEMI and DKA      Overview/Hospital Course:  76 y/o AAF admitted to ICU with a dx of DKA and NSTEMI . Started on Insulin drio , heparin drip, plavix , asa , BB and statin.  [facility]iology consulted and performed a LHC which show a multivessel ds .  CVT consulted and plan for CABG Monday. As of 7/14/22 patient seen and examined in room, lying in bed. HD in progress. Denies any cardiac complaints today. Awaiting CABG on Monday.           Review of Systems   Constitutional:  Positive for activity change and fatigue.   HENT: Negative.     Eyes:  Negative.    Respiratory:  Negative for shortness of breath.    Cardiovascular:  Negative for chest pain.   Gastrointestinal:  Negative for nausea and vomiting.   Endocrine: Negative.    Genitourinary: Negative.    Musculoskeletal: Negative.    Skin: Negative.    Allergic/Immunologic: Negative.    Neurological: Negative.    Hematological: Negative.    Psychiatric/Behavioral: Negative.     Objective:     Vital Signs (Most Recent):  Temp: 97.5 °F (36.4 °C) (07/14/22 1234)  Pulse: 79 (07/14/22 1234)  Resp: 20 (07/14/22 1234)  BP: (!) 128/59 (07/14/22 1234)  SpO2: 96 % (07/14/22 1234)   Vital Signs (24h Range):  Temp:  [97.5 °F (36.4 °C)-99.7 °F (37.6 °C)] 97.5 °F (36.4 °C)  Pulse:  [69-82] 79  Resp:  [17-20] 20  SpO2:  [90 %-97 %] 96 %  BP: ()/(54-78) 128/59     Weight: 61.7 kg (136 lb)  Body mass index is 22.63 kg/m².    Intake/Output Summary (Last 24 hours) at 7/14/2022 1537  Last data filed at 7/14/2022 1400  Gross per 24 hour   Intake 1195.11 ml   Output 1000 ml   Net 195.11 ml      Physical Exam  Vitals and nursing note reviewed.   Constitutional:       Appearance: She is ill-appearing.   HENT:      Head: Normocephalic and atraumatic.      Nose: Nose normal.      Mouth/Throat:      Mouth: Mucous membranes are moist.   Eyes:      Extraocular Movements: Extraocular movements intact.      Conjunctiva/sclera: Conjunctivae normal.      Pupils: Pupils are equal, round, and reactive to light.   Cardiovascular:      Rate and Rhythm: Normal rate and regular rhythm.      Pulses: Normal pulses.      Heart sounds: No murmur heard.    No friction rub.   Pulmonary:      Effort: Pulmonary effort is normal. No respiratory distress.      Breath sounds: No stridor. No wheezing, rhonchi or rales.   Abdominal:      General: Abdomen is flat. Bowel sounds are normal. There is no distension.      Palpations: There is no mass.      Tenderness: There is no abdominal tenderness. There is no guarding or rebound.      Hernia: No hernia  is present.   Musculoskeletal:         General: No swelling, tenderness, deformity or signs of injury. Normal range of motion.      Cervical back: Normal range of motion and neck supple. No rigidity or tenderness.   Skin:     General: Skin is warm.      Capillary Refill: Capillary refill takes less than 2 seconds.      Coloration: Skin is not jaundiced.      Findings: No bruising.   Neurological:      General: No focal deficit present.      Mental Status: She is alert.      Cranial Nerves: No cranial nerve deficit.      Sensory: No sensory deficit.      Motor: No weakness.      Coordination: Coordination normal.   Psychiatric:         Mood and Affect: Mood normal.       Significant Labs: All pertinent labs within the past 24 hours have been reviewed.  BMP:   Recent Labs   Lab 07/12/22  2020 07/13/22  0200 07/14/22  1331   *   < > 204*      < > 133*   K 3.8   < > 4.1   CL 95   < > 99   CO2 26   < > 15*   BUN 35*   < > 23   CREATININE 3.6*   < > 3.1*   CALCIUM 9.3   < > 8.6*   MG 2.1  --   --     < > = values in this interval not displayed.     CBC:   Recent Labs   Lab 07/13/22  0200 07/13/22  0621 07/14/22  0524   WBC 7.94 7.95 6.56  6.73   HGB 8.1* 8.9* 9.4*  9.5*   HCT 24.7* 28.6* 29.3*  28.4*   * 184 192  183     CMP:   Recent Labs   Lab 07/14/22  0120 07/14/22  0524 07/14/22  1331   * 134* 133*   K 3.9 4.0 4.1   CL 96 95 99   CO2 21* 24 15*   * 148* 204*   BUN 54* 64* 23   CREATININE 5.3* 5.5* 3.1*   CALCIUM 8.4* 8.5* 8.6*   ANIONGAP 16 15 19*   EGFRNONAA 7* 7* 14*       Significant Imaging: I have reviewed all pertinent imaging results/findings within the past 24 hours.      Assessment/Plan:      * NSTEMI (non-ST elevated myocardial infarction)  Cardiology consulted  Cont Heparin drip , asa , plavix and BB  TTE  Trend troponin level   S/P LHC which multiple vessel ds   CVT consulted     Plan for CABG on Monday     Metabolic acidosis  Cont HD        DKA (diabetic  ketoacidosis)  Start DKA protocol   Cont insulin drip   Monitor  Electrolytes and CBG    RESOLVED  Cont long and short acting insulin   Keep CBG < 180         CAD (coronary artery disease)  Cont asa , bb and statin  S/P LHC which show multiple vessel ds   CVT consulted . Plan for CABG Monday           ESRD (end stage renal disease)  Nephrology consulted   HD per nephrology     Hypertension associated with diabetes  Cont BB          VTE Risk Mitigation (From admission, onward)         Ordered     IP VTE HIGH RISK PATIENT  Once         07/12/22 1637     Place DYANA hose  Until discontinued         07/12/22 1637     Place sequential compression device  Until discontinued         07/12/22 1637     Place sequential compression device  Until discontinued         07/12/22 1216                Discharge Planning   MARIBELL:      Code Status: Full Code   Is the patient medically ready for discharge?:     Reason for patient still in hospital (select all that apply): Patient trending condition, Laboratory test and Treatment  Discharge Plan A: Home with family                  Iram Mcgill NP  Department of Hospital Medicine   [facility] - Med Surg

## 2022-07-14 NOTE — PROGRESS NOTES
[facility]  Cardiology  Progress Note    Patient Name: Avril Monzon  MRN: [id number]  Admission Date: 7/12/2022  Hospital Length of Stay: 2 days  Code Status: Full Code   Attending Physician: Suhas Prakash, *   Primary Care Physician: Rose Parks MD  Expected Discharge Date:   Principal Problem:NSTEMI (non-ST elevated myocardial infarction)    Subjective:     Hospital Course:   7/14/2022--Patient seen and examined in room, lying in bed. HD in progress. Denies any cardiac complaints today. Awaiting CABG on Monday.       Interval History: denies any new cardiac complaints on exam today.     Review of Systems   Constitutional: Positive for malaise/fatigue.   HENT:  Negative for hearing loss and hoarse voice.    Eyes:  Negative for blurred vision and visual disturbance.   Cardiovascular:  Negative for chest pain, claudication, dyspnea on exertion, irregular heartbeat, leg swelling, near-syncope, orthopnea, palpitations, paroxysmal nocturnal dyspnea and syncope.   Respiratory:  Negative for cough, hemoptysis, shortness of breath, sleep disturbances due to breathing, snoring and wheezing.    Endocrine: Negative for heat intolerance.   Hematologic/Lymphatic: Does not bruise/bleed easily.   Skin:  Negative for color change, dry skin and nail changes.   Musculoskeletal:  Positive for arthritis and back pain. Negative for joint pain and myalgias.   Gastrointestinal:  Negative for bloating, abdominal pain, constipation, nausea and vomiting.   Genitourinary:  Negative for dysuria, flank pain, hematuria and hesitancy.   Neurological:  Negative for headaches, light-headedness, loss of balance, numbness, paresthesias and weakness.   Psychiatric/Behavioral:  Negative for altered mental status.    Allergic/Immunologic: Negative for environmental allergies.   Objective:     Vital Signs (Most Recent):  Temp: 98.6 °F (37 °C) (07/14/22 0753)  Pulse: 72 (07/14/22 0753)  Resp: 18 (07/14/22 0753)  BP: 126/78 (07/14/22  0753)  SpO2: (!) 93 % (07/14/22 0753)   Vital Signs (24h Range):  Temp:  [97.7 °F (36.5 °C)-99.7 °F (37.6 °C)] 98.6 °F (37 °C)  Pulse:  [71-82] 72  Resp:  [17-19] 18  SpO2:  [90 %-97 %] 93 %  BP: ()/(54-78) 126/78     Weight: 61.7 kg (136 lb)  Body mass index is 22.63 kg/m².     SpO2: (!) 93 %  O2 Device (Oxygen Therapy): room air      Intake/Output Summary (Last 24 hours) at 7/14/2022 1005  Last data filed at 7/14/2022 0800  Gross per 24 hour   Intake 475.11 ml   Output --   Net 475.11 ml       Lines/Drains/Airways       Drain  Duration                  Hemodialysis AV Fistula 04/01/20 2203 Left upper arm 833 days              Peripheral Intravenous Line  Duration                  Peripheral IV - Single Lumen 20 G Right Hand -- days         Peripheral IV - Single Lumen 07/12/22 1046 20 G Right Forearm 1 day         Peripheral IV - Single Lumen 07/12/22 1714 20 G Right Antecubital 1 day                    Physical Exam  Vitals and nursing note reviewed.   Constitutional:       General: She is not in acute distress.     Appearance: Normal appearance. She is well-developed. She is not ill-appearing.   HENT:      Head: Normocephalic and atraumatic.      Nose: Nose normal.      Mouth/Throat:      Mouth: Mucous membranes are moist.   Eyes:      Pupils: Pupils are equal, round, and reactive to light.   Neck:      Thyroid: No thyromegaly.      Vascular: No JVD.      Trachea: No tracheal deviation.   Cardiovascular:      Rate and Rhythm: Normal rate and regular rhythm.      Chest Wall: PMI is not displaced.      Pulses: Intact distal pulses.           Radial pulses are 2+ on the right side and 2+ on the left side.        Dorsalis pedis pulses are 2+ on the right side and 2+ on the left side.      Heart sounds: S1 normal and S2 normal. Heart sounds not distant. No murmur heard.     Comments: Chest pain free  Pulmonary:      Effort: Pulmonary effort is normal. No respiratory distress.      Breath sounds: Normal breath  sounds and air entry. No decreased breath sounds, wheezing or rhonchi.   Abdominal:      General: Bowel sounds are normal. There is no distension.      Palpations: Abdomen is soft.      Tenderness: There is no abdominal tenderness.   Musculoskeletal:         General: No swelling. Normal range of motion.      Cervical back: Full passive range of motion without pain, normal range of motion and neck supple.      Right ankle: No swelling.      Left ankle: No swelling.   Skin:     General: Skin is warm and dry.      Capillary Refill: Capillary refill takes less than 2 seconds.      Nails: There is no clubbing.   Neurological:      General: No focal deficit present.      Mental Status: She is alert and oriented to person, place, and time.      Motor: No weakness.   Psychiatric:         Mood and Affect: Mood normal.         Speech: Speech normal.         Behavior: Behavior normal.         Thought Content: Thought content normal.         Judgment: Judgment normal.       Significant Labs: All pertinent lab results from the last 24 hours have been reviewed.    Significant Imaging: Echocardiogram: Transthoracic echo (TTE) complete (Cupid Only):   Results for orders placed or performed during the hospital encounter of 07/12/22   Echo   Result Value Ref Range    BSA 1.68 m2    IVC diameter 1.93 cm    Left Ventricular Outflow Tract Mean Velocity 0.498099860435560 cm/s    Left Ventricular Outflow Tract Mean Gradient 1.94 mmHg    LVIDd 4.43 3.5 - 6.0 cm    IVS 1.15 (A) 0.6 - 1.1 cm    Posterior Wall 0.96 0.6 - 1.1 cm    Ao root annulus 2.78 cm    LVIDs 3.44 2.1 - 4.0 cm    FS 22 28 - 44 %    STJ 2.42 cm    Ascending aorta 2.32 cm    LV mass 161.00 g    LA size 3.85 cm    Left Ventricle Relative Wall Thickness 0.43 cm    AV mean gradient 4 mmHg    AV valve area 1.56 cm2    AV Velocity Ratio 0.79     AV index (prosthetic) 0.78     MV valve area p 1/2 method 6.15 cm2    E/A ratio 1.31     E wave deceleration time 123.614212369303490  msec    IVRT 39.534487657051438 msec    LVOT diameter 1.60 cm    LVOT area 2.0 cm2    LVOT peak lio 1.00 m/s    LVOT peak VTI 19.70 cm    Ao peak lio 1.26 m/s    Ao VTI 25.4 cm    RVOT peak lio 0.62 m/s    RVOT peak VTI 13.3 cm    Mr max lio 6.14 m/s    LVOT stroke volume 39.59 cm3    AV peak gradient 6 mmHg    PV mean gradient 0.85 mmHg    MV Peak E Lio 1.11 m/s    TR Max Lio 4.26 m/s    MV stenosis pressure 1/2 time 35.772722539168820 ms    MV Peak A Lio 0.85 m/s    LV Systolic Volume 48.80 mL    LV Systolic Volume Index 29.0 mL/m2    LV Diastolic Volume 88.87 mL    LV Diastolic Volume Index 52.90 mL/m2    LV Mass Index 96 g/m2    RA Major Axis 3.88 cm    Left Atrium Minor Axis 3.59 cm    Left Atrium Major Axis 4.08 cm    Triscuspid Valve Regurgitation Peak Gradient 73 mmHg    EF 55 %    Narrative    · Concentric hypertrophy and normal systolic function.  · The estimated ejection fraction is 55%.  · Normal left ventricular diastolic function.  · Normal right ventricular size with normal right ventricular systolic   function.  · Moderate tricuspid regurgitation.  · There is pulmonary hypertension.        Assessment and Plan:         * NSTEMI (non-ST elevated myocardial infarction)  Plan for CABG on Monday per CTS  Continue ASA BB  Chest pain free    Elevated troponin  77 y.o. female patient with a PMHx of CKD, CAD s/p cardiac stenting, DM2, heart failure, HLD, HTN, NSTEMI, seizure, tobacco dependence, and CVA presents with NSTEMI and CHF.  EKG NSR, ST depression multiple leads. Initial troponin is 12. Pt being dialyzed today.    Continue IV heparin, cardiac cath once CHF stable, being dialyzed today    ESRD (end stage renal disease)  Mgmt per nephrology    Hypertension associated with diabetes  Continue BB        VTE Risk Mitigation (From admission, onward)         Ordered     IP VTE HIGH RISK PATIENT  Once         07/12/22 1637     Place DYANA hose  Until discontinued         07/12/22 1637     Place sequential  compression device  Until discontinued         07/12/22 1637     Place sequential compression device  Until discontinued         07/12/22 1216                Evie Guerrero, ARLETHP-C  Cardiology  [facility]

## 2022-07-14 NOTE — SUBJECTIVE & OBJECTIVE
Interval History: denies any new cardiac complaints on exam today.     Review of Systems   Constitutional: Positive for malaise/fatigue.   HENT:  Negative for hearing loss and hoarse voice.    Eyes:  Negative for blurred vision and visual disturbance.   Cardiovascular:  Negative for chest pain, claudication, dyspnea on exertion, irregular heartbeat, leg swelling, near-syncope, orthopnea, palpitations, paroxysmal nocturnal dyspnea and syncope.   Respiratory:  Negative for cough, hemoptysis, shortness of breath, sleep disturbances due to breathing, snoring and wheezing.    Endocrine: Negative for heat intolerance.   Hematologic/Lymphatic: Does not bruise/bleed easily.   Skin:  Negative for color change, dry skin and nail changes.   Musculoskeletal:  Positive for arthritis and back pain. Negative for joint pain and myalgias.   Gastrointestinal:  Negative for bloating, abdominal pain, constipation, nausea and vomiting.   Genitourinary:  Negative for dysuria, flank pain, hematuria and hesitancy.   Neurological:  Negative for headaches, light-headedness, loss of balance, numbness, paresthesias and weakness.   Psychiatric/Behavioral:  Negative for altered mental status.    Allergic/Immunologic: Negative for environmental allergies.   Objective:     Vital Signs (Most Recent):  Temp: 98.6 °F (37 °C) (07/14/22 0753)  Pulse: 72 (07/14/22 0753)  Resp: 18 (07/14/22 0753)  BP: 126/78 (07/14/22 0753)  SpO2: (!) 93 % (07/14/22 0753)   Vital Signs (24h Range):  Temp:  [97.7 °F (36.5 °C)-99.7 °F (37.6 °C)] 98.6 °F (37 °C)  Pulse:  [71-82] 72  Resp:  [17-19] 18  SpO2:  [90 %-97 %] 93 %  BP: ()/(54-78) 126/78     Weight: 61.7 kg (136 lb)  Body mass index is 22.63 kg/m².     SpO2: (!) 93 %  O2 Device (Oxygen Therapy): room air      Intake/Output Summary (Last 24 hours) at 7/14/2022 1005  Last data filed at 7/14/2022 0800  Gross per 24 hour   Intake 475.11 ml   Output --   Net 475.11 ml       Lines/Drains/Airways       Drain   Duration                  Hemodialysis AV Fistula 04/01/20 2203 Left upper arm 833 days              Peripheral Intravenous Line  Duration                  Peripheral IV - Single Lumen 20 G Right Hand -- days         Peripheral IV - Single Lumen 07/12/22 1046 20 G Right Forearm 1 day         Peripheral IV - Single Lumen 07/12/22 1714 20 G Right Antecubital 1 day                    Physical Exam  Vitals and nursing note reviewed.   Constitutional:       General: She is not in acute distress.     Appearance: Normal appearance. She is well-developed. She is not ill-appearing.   HENT:      Head: Normocephalic and atraumatic.      Nose: Nose normal.      Mouth/Throat:      Mouth: Mucous membranes are moist.   Eyes:      Pupils: Pupils are equal, round, and reactive to light.   Neck:      Thyroid: No thyromegaly.      Vascular: No JVD.      Trachea: No tracheal deviation.   Cardiovascular:      Rate and Rhythm: Normal rate and regular rhythm.      Chest Wall: PMI is not displaced.      Pulses: Intact distal pulses.           Radial pulses are 2+ on the right side and 2+ on the left side.        Dorsalis pedis pulses are 2+ on the right side and 2+ on the left side.      Heart sounds: S1 normal and S2 normal. Heart sounds not distant. No murmur heard.     Comments: Chest pain free  Pulmonary:      Effort: Pulmonary effort is normal. No respiratory distress.      Breath sounds: Normal breath sounds and air entry. No decreased breath sounds, wheezing or rhonchi.   Abdominal:      General: Bowel sounds are normal. There is no distension.      Palpations: Abdomen is soft.      Tenderness: There is no abdominal tenderness.   Musculoskeletal:         General: No swelling. Normal range of motion.      Cervical back: Full passive range of motion without pain, normal range of motion and neck supple.      Right ankle: No swelling.      Left ankle: No swelling.   Skin:     General: Skin is warm and dry.      Capillary Refill:  Capillary refill takes less than 2 seconds.      Nails: There is no clubbing.   Neurological:      General: No focal deficit present.      Mental Status: She is alert and oriented to person, place, and time.      Motor: No weakness.   Psychiatric:         Mood and Affect: Mood normal.         Speech: Speech normal.         Behavior: Behavior normal.         Thought Content: Thought content normal.         Judgment: Judgment normal.       Significant Labs: All pertinent lab results from the last 24 hours have been reviewed.    Significant Imaging: Echocardiogram: Transthoracic echo (TTE) complete ([facility] Only):   Results for orders placed or performed during the hospital encounter of 07/12/22   Echo   Result Value Ref Range    BSA 1.68 m2    IVC diameter 1.93 cm    Left Ventricular Outflow Tract Mean Velocity 0.203481114491677 cm/s    Left Ventricular Outflow Tract Mean Gradient 1.94 mmHg    LVIDd 4.43 3.5 - 6.0 cm    IVS 1.15 (A) 0.6 - 1.1 cm    Posterior Wall 0.96 0.6 - 1.1 cm    Ao root annulus 2.78 cm    LVIDs 3.44 2.1 - 4.0 cm    FS 22 28 - 44 %    STJ 2.42 cm    Ascending aorta 2.32 cm    LV mass 161.00 g    LA size 3.85 cm    Left Ventricle Relative Wall Thickness 0.43 cm    AV mean gradient 4 mmHg    AV valve area 1.56 cm2    AV Velocity Ratio 0.79     AV index (prosthetic) 0.78     MV valve area p 1/2 method 6.15 cm2    E/A ratio 1.31     E wave deceleration time 123.501839027379998 msec    IVRT 39.705266303635820 msec    LVOT diameter 1.60 cm    LVOT area 2.0 cm2    LVOT peak lio 1.00 m/s    LVOT peak VTI 19.70 cm    Ao peak lio 1.26 m/s    Ao VTI 25.4 cm    RVOT peak lio 0.62 m/s    RVOT peak VTI 13.3 cm    Mr max lio 6.14 m/s    LVOT stroke volume 39.59 cm3    AV peak gradient 6 mmHg    PV mean gradient 0.85 mmHg    MV Peak E Lio 1.11 m/s    TR Max Lio 4.26 m/s    MV stenosis pressure 1/2 time 35.002226812519084 ms    MV Peak A Lio 0.85 m/s    LV Systolic Volume 48.80 mL    LV Systolic Volume Index 29.0  mL/m2    LV Diastolic Volume 88.87 mL    LV Diastolic Volume Index 52.90 mL/m2    LV Mass Index 96 g/m2    RA Major Axis 3.88 cm    Left Atrium Minor Axis 3.59 cm    Left Atrium Major Axis 4.08 cm    Triscuspid Valve Regurgitation Peak Gradient 73 mmHg    EF 55 %    Narrative    · Concentric hypertrophy and normal systolic function.  · The estimated ejection fraction is 55%.  · Normal left ventricular diastolic function.  · Normal right ventricular size with normal right ventricular systolic   function.  · Moderate tricuspid regurgitation.  · There is pulmonary hypertension.

## 2022-07-14 NOTE — NURSING
07/14/22 1100   Post-Hemodialysis Assessment   Blood Volume Processed (Liters) 69 L   Dialyzer Clearance Lightly streaked   Total UF (mL) 1000 mL   Patient Response to Treatment tolerated well   Post-Hemodialysis Comments pt ran for 3 hours on hemodialysis,tolerated well, no c/o cramps during treatment, pt ended treatment with nad and vss, report given to floor rn

## 2022-07-15 LAB
ANION GAP SERPL CALC-SCNC: 12 MMOL/L (ref 8–16)
ANION GAP SERPL CALC-SCNC: 13 MMOL/L (ref 8–16)
ANION GAP SERPL CALC-SCNC: 13 MMOL/L (ref 8–16)
ANION GAP SERPL CALC-SCNC: 15 MMOL/L (ref 8–16)
ANION GAP SERPL CALC-SCNC: 15 MMOL/L (ref 8–16)
ANION GAP SERPL CALC-SCNC: 16 MMOL/L (ref 8–16)
BASOPHILS # BLD AUTO: 0.02 K/UL (ref 0–0.2)
BASOPHILS NFR BLD: 0.3 % (ref 0–1.9)
BUN SERPL-MCNC: 34 MG/DL (ref 8–23)
BUN SERPL-MCNC: 39 MG/DL (ref 8–23)
BUN SERPL-MCNC: 39 MG/DL (ref 8–23)
BUN SERPL-MCNC: 42 MG/DL (ref 8–23)
BUN SERPL-MCNC: 44 MG/DL (ref 8–23)
BUN SERPL-MCNC: 45 MG/DL (ref 8–23)
CALCIUM SERPL-MCNC: 8.3 MG/DL (ref 8.7–10.5)
CALCIUM SERPL-MCNC: 8.4 MG/DL (ref 8.7–10.5)
CALCIUM SERPL-MCNC: 8.5 MG/DL (ref 8.7–10.5)
CALCIUM SERPL-MCNC: 8.5 MG/DL (ref 8.7–10.5)
CALCIUM SERPL-MCNC: 8.6 MG/DL (ref 8.7–10.5)
CALCIUM SERPL-MCNC: 8.7 MG/DL (ref 8.7–10.5)
CHLORIDE SERPL-SCNC: 94 MMOL/L (ref 95–110)
CHLORIDE SERPL-SCNC: 95 MMOL/L (ref 95–110)
CHLORIDE SERPL-SCNC: 95 MMOL/L (ref 95–110)
CHLORIDE SERPL-SCNC: 96 MMOL/L (ref 95–110)
CHLORIDE SERPL-SCNC: 98 MMOL/L (ref 95–110)
CHLORIDE SERPL-SCNC: 98 MMOL/L (ref 95–110)
CO2 SERPL-SCNC: 20 MMOL/L (ref 23–29)
CO2 SERPL-SCNC: 22 MMOL/L (ref 23–29)
CO2 SERPL-SCNC: 23 MMOL/L (ref 23–29)
CO2 SERPL-SCNC: 25 MMOL/L (ref 23–29)
CREAT SERPL-MCNC: 4.7 MG/DL (ref 0.5–1.4)
CREAT SERPL-MCNC: 4.9 MG/DL (ref 0.5–1.4)
CREAT SERPL-MCNC: 5.2 MG/DL (ref 0.5–1.4)
CREAT SERPL-MCNC: 5.8 MG/DL (ref 0.5–1.4)
CREAT SERPL-MCNC: 5.9 MG/DL (ref 0.5–1.4)
CREAT SERPL-MCNC: 6.4 MG/DL (ref 0.5–1.4)
DIFFERENTIAL METHOD: ABNORMAL
EOSINOPHIL # BLD AUTO: 0.1 K/UL (ref 0–0.5)
EOSINOPHIL NFR BLD: 1 % (ref 0–8)
ERYTHROCYTE [DISTWIDTH] IN BLOOD BY AUTOMATED COUNT: 13.7 % (ref 11.5–14.5)
EST. GFR  (AFRICAN AMERICAN): 10 ML/MIN/1.73 M^2
EST. GFR  (AFRICAN AMERICAN): 7 ML/MIN/1.73 M^2
EST. GFR  (AFRICAN AMERICAN): 7 ML/MIN/1.73 M^2
EST. GFR  (AFRICAN AMERICAN): 8 ML/MIN/1.73 M^2
EST. GFR  (AFRICAN AMERICAN): 9 ML/MIN/1.73 M^2
EST. GFR  (AFRICAN AMERICAN): 9 ML/MIN/1.73 M^2
EST. GFR  (NON AFRICAN AMERICAN): 6 ML/MIN/1.73 M^2
EST. GFR  (NON AFRICAN AMERICAN): 6 ML/MIN/1.73 M^2
EST. GFR  (NON AFRICAN AMERICAN): 7 ML/MIN/1.73 M^2
EST. GFR  (NON AFRICAN AMERICAN): 8 ML/MIN/1.73 M^2
EST. GFR  (NON AFRICAN AMERICAN): 8 ML/MIN/1.73 M^2
EST. GFR  (NON AFRICAN AMERICAN): 9 ML/MIN/1.73 M^2
GLUCOSE SERPL-MCNC: 151 MG/DL (ref 70–110)
GLUCOSE SERPL-MCNC: 177 MG/DL (ref 70–110)
GLUCOSE SERPL-MCNC: 229 MG/DL (ref 70–110)
GLUCOSE SERPL-MCNC: 230 MG/DL (ref 70–110)
GLUCOSE SERPL-MCNC: 249 MG/DL (ref 70–110)
GLUCOSE SERPL-MCNC: 261 MG/DL (ref 70–110)
HCT VFR BLD AUTO: 27.8 % (ref 37–48.5)
HGB BLD-MCNC: 8.9 G/DL (ref 12–16)
IMM GRANULOCYTES # BLD AUTO: 0.02 K/UL (ref 0–0.04)
IMM GRANULOCYTES NFR BLD AUTO: 0.3 % (ref 0–0.5)
LYMPHOCYTES # BLD AUTO: 0.9 K/UL (ref 1–4.8)
LYMPHOCYTES NFR BLD: 14.1 % (ref 18–48)
MCH RBC QN AUTO: 31.4 PG (ref 27–31)
MCHC RBC AUTO-ENTMCNC: 32 G/DL (ref 32–36)
MCV RBC AUTO: 98 FL (ref 82–98)
MONOCYTES # BLD AUTO: 0.4 K/UL (ref 0.3–1)
MONOCYTES NFR BLD: 6.9 % (ref 4–15)
NEUTROPHILS # BLD AUTO: 4.7 K/UL (ref 1.8–7.7)
NEUTROPHILS NFR BLD: 77.4 % (ref 38–73)
NRBC BLD-RTO: 0 /100 WBC
PHOSPHATE SERPL-MCNC: 4.6 MG/DL (ref 2.7–4.5)
PLATELET # BLD AUTO: 197 K/UL (ref 150–450)
PMV BLD AUTO: 9.8 FL (ref 9.2–12.9)
POCT GLUCOSE: 168 MG/DL (ref 70–110)
POCT GLUCOSE: 249 MG/DL (ref 70–110)
POCT GLUCOSE: 276 MG/DL (ref 70–110)
POCT GLUCOSE: 289 MG/DL (ref 70–110)
POTASSIUM SERPL-SCNC: 3.8 MMOL/L (ref 3.5–5.1)
POTASSIUM SERPL-SCNC: 3.8 MMOL/L (ref 3.5–5.1)
POTASSIUM SERPL-SCNC: 3.9 MMOL/L (ref 3.5–5.1)
POTASSIUM SERPL-SCNC: 4 MMOL/L (ref 3.5–5.1)
POTASSIUM SERPL-SCNC: 4.2 MMOL/L (ref 3.5–5.1)
POTASSIUM SERPL-SCNC: 4.7 MMOL/L (ref 3.5–5.1)
RBC # BLD AUTO: 2.83 M/UL (ref 4–5.4)
SODIUM SERPL-SCNC: 131 MMOL/L (ref 136–145)
SODIUM SERPL-SCNC: 132 MMOL/L (ref 136–145)
SODIUM SERPL-SCNC: 132 MMOL/L (ref 136–145)
SODIUM SERPL-SCNC: 133 MMOL/L (ref 136–145)
SODIUM SERPL-SCNC: 133 MMOL/L (ref 136–145)
SODIUM SERPL-SCNC: 135 MMOL/L (ref 136–145)
WBC # BLD AUTO: 6.09 K/UL (ref 3.9–12.7)

## 2022-07-15 PROCEDURE — 99232 SBSQ HOSP IP/OBS MODERATE 35: CPT | Mod: ,,, | Performed by: NURSE PRACTITIONER

## 2022-07-15 PROCEDURE — 21400001 HC TELEMETRY ROOM

## 2022-07-15 PROCEDURE — 36415 COLL VENOUS BLD VENIPUNCTURE: CPT | Performed by: INTERNAL MEDICINE

## 2022-07-15 PROCEDURE — 85025 COMPLETE CBC W/AUTO DIFF WBC: CPT | Performed by: INTERNAL MEDICINE

## 2022-07-15 PROCEDURE — 25000003 PHARM REV CODE 250: Performed by: INTERNAL MEDICINE

## 2022-07-15 PROCEDURE — 84100 ASSAY OF PHOSPHORUS: CPT | Performed by: INTERNAL MEDICINE

## 2022-07-15 PROCEDURE — 99232 SBSQ HOSP IP/OBS MODERATE 35: CPT | Mod: ,,, | Performed by: INTERNAL MEDICINE

## 2022-07-15 PROCEDURE — 99232 PR SUBSEQUENT HOSPITAL CARE,LEVL II: ICD-10-PCS | Mod: ,,, | Performed by: NURSE PRACTITIONER

## 2022-07-15 PROCEDURE — 80048 BASIC METABOLIC PNL TOTAL CA: CPT | Mod: 91 | Performed by: INTERNAL MEDICINE

## 2022-07-15 PROCEDURE — 99232 PR SUBSEQUENT HOSPITAL CARE,LEVL II: ICD-10-PCS | Mod: ,,, | Performed by: INTERNAL MEDICINE

## 2022-07-15 RX ADMIN — DOCUSATE SODIUM AND SENNOSIDES 2 TABLET: 8.6; 5 TABLET, FILM COATED ORAL at 09:07

## 2022-07-15 RX ADMIN — INSULIN DETEMIR 6 UNITS: 100 INJECTION, SOLUTION SUBCUTANEOUS at 09:07

## 2022-07-15 RX ADMIN — INSULIN ASPART 3 UNITS: 100 INJECTION, SOLUTION INTRAVENOUS; SUBCUTANEOUS at 12:07

## 2022-07-15 RX ADMIN — MUPIROCIN: 20 OINTMENT TOPICAL at 09:07

## 2022-07-15 RX ADMIN — INSULIN ASPART 3 UNITS: 100 INJECTION, SOLUTION INTRAVENOUS; SUBCUTANEOUS at 04:07

## 2022-07-15 RX ADMIN — ASPIRIN 81 MG: 81 TABLET, COATED ORAL at 09:07

## 2022-07-15 RX ADMIN — FAMOTIDINE 20 MG: 20 TABLET ORAL at 09:07

## 2022-07-15 RX ADMIN — METOPROLOL TARTRATE 50 MG: 50 TABLET, FILM COATED ORAL at 09:07

## 2022-07-15 RX ADMIN — INSULIN ASPART 1 UNITS: 100 INJECTION, SOLUTION INTRAVENOUS; SUBCUTANEOUS at 09:07

## 2022-07-15 NOTE — PLAN OF CARE
Problem: Adult Inpatient Plan of Care  Goal: Plan of Care Review  Outcome: Ongoing, Progressing  Goal: Patient-Specific Goal (Individualized)  Outcome: Ongoing, Progressing  Goal: Absence of Hospital-Acquired Illness or Injury  Outcome: Ongoing, Progressing  Goal: Optimal Comfort and Wellbeing  Outcome: Ongoing, Progressing  Goal: Readiness for Transition of Care  Outcome: Ongoing, Progressing     Problem: Device-Related Complication Risk (Hemodialysis)  Goal: Safe, Effective Therapy Delivery  Outcome: Ongoing, Progressing     Problem: Hemodynamic Instability (Hemodialysis)  Goal: Effective Tissue Perfusion  Outcome: Ongoing, Progressing     Problem: Infection (Hemodialysis)  Goal: Absence of Infection Signs and Symptoms  Outcome: Ongoing, Progressing     Problem: Diabetic Ketoacidosis  Goal: Fluid and Electrolyte Balance with Absence of Ketosis  Outcome: Ongoing, Progressing     Problem: Diabetes Comorbidity  Goal: Blood Glucose Level Within Targeted Range  Outcome: Ongoing, Progressing     Problem: Skin Injury Risk Increased  Goal: Skin Health and Integrity  Outcome: Ongoing, Progressing     Problem: Fall Injury Risk  Goal: Absence of Fall and Fall-Related Injury  Outcome: Ongoing, Progressing

## 2022-07-15 NOTE — SUBJECTIVE & OBJECTIVE
Interval History: See hospital course for today      Review of Systems   Constitutional:  Positive for appetite change (improved) and fatigue.   Respiratory:  Negative for shortness of breath.    Cardiovascular:  Negative for chest pain and leg swelling.   Gastrointestinal:  Negative for abdominal pain, diarrhea, nausea and vomiting.        Indigestion after eating   Genitourinary:  Positive for decreased urine volume.   Neurological:  Positive for weakness.   Psychiatric/Behavioral:  Negative for agitation, behavioral problems, confusion, decreased concentration and dysphoric mood. The patient is not nervous/anxious.    Objective:     Vital Signs (Most Recent):  Temp: 97.8 °F (36.6 °C) (07/15/22 1146)  Pulse: 71 (07/15/22 1146)  Resp: 18 (07/15/22 1146)  BP: 137/62 (07/15/22 1146)  SpO2: 99 % (07/15/22 1146) Vital Signs (24h Range):  Temp:  [97.5 °F (36.4 °C)-98.5 °F (36.9 °C)] 97.8 °F (36.6 °C)  Pulse:  [71-79] 71  Resp:  [17-20] 18  SpO2:  [92 %-99 %] 99 %  BP: (115-158)/(58-70) 137/62     Weight: 60.7 kg (133 lb 13.1 oz)  Body mass index is 22.27 kg/m².    Intake/Output Summary (Last 24 hours) at 7/15/2022 1210  Last data filed at 7/15/2022 1000  Gross per 24 hour   Intake 1200 ml   Output --   Net 1200 ml      Physical Exam  Vitals and nursing note reviewed. Exam conducted with a chaperone present (visitor).   Constitutional:       General: She is not in acute distress.     Appearance: She is ill-appearing. She is not toxic-appearing.   HENT:      Head: Normocephalic and atraumatic.   Cardiovascular:      Rate and Rhythm: Normal rate.   Pulmonary:      Effort: Pulmonary effort is normal. No respiratory distress.   Abdominal:      Palpations: Abdomen is soft.      Tenderness: There is no abdominal tenderness.   Musculoskeletal:      Right lower leg: No edema.      Left lower leg: No edema.      Comments: Left upper arm av fistula   Skin:     General: Skin is warm.   Neurological:      Mental Status: She is alert  and oriented to person, place, and time.      Motor: Weakness present.   Psychiatric:         Mood and Affect: Mood normal.         Speech: Speech normal.         Behavior: Behavior normal.       Significant Labs: All pertinent labs within the past 24 hours have been reviewed.  CBC:   Recent Labs   Lab 07/14/22  0524 07/15/22  0543   WBC 6.56  6.73 6.09   HGB 9.4*  9.5* 8.9*   HCT 29.3*  28.4* 27.8*     183 197     CMP:   Recent Labs   Lab 07/15/22  0144 07/15/22  0543 07/15/22  0924   * 133* 133*   K 4.0 3.8 3.8   CL 95 98 98   CO2 25 23 22*   * 177* 151*   BUN 34* 39* 39*   CREATININE 4.7* 4.9* 5.2*   CALCIUM 8.5* 8.3* 8.4*   ANIONGAP 15 12 13   EGFRNONAA 9* 8* 8*         Significant Imaging: I have reviewed all pertinent imaging results/findings within the past 24 hours.  U/S: I have reviewed all pertinent results/findings within the past 24 hours and my personal findings are:  carotid us with no significant stenosis

## 2022-07-15 NOTE — SUBJECTIVE & OBJECTIVE
Review of Systems   Constitutional: Positive for malaise/fatigue.   HENT:  Negative for hearing loss and hoarse voice.    Eyes:  Negative for blurred vision and visual disturbance.   Cardiovascular:  Negative for chest pain, claudication, dyspnea on exertion, irregular heartbeat, leg swelling, near-syncope, orthopnea, palpitations, paroxysmal nocturnal dyspnea and syncope.   Respiratory:  Negative for cough, hemoptysis, shortness of breath, sleep disturbances due to breathing, snoring and wheezing.    Endocrine: Negative for heat intolerance.   Hematologic/Lymphatic: Does not bruise/bleed easily.   Skin:  Negative for color change, dry skin and nail changes.   Musculoskeletal:  Positive for arthritis and back pain. Negative for joint pain and myalgias.   Gastrointestinal:  Negative for bloating, abdominal pain, constipation, nausea and vomiting.   Genitourinary:  Negative for dysuria, flank pain, hematuria and hesitancy.   Neurological:  Negative for headaches, light-headedness, loss of balance, numbness, paresthesias and weakness.   Psychiatric/Behavioral:  Negative for altered mental status.    Allergic/Immunologic: Negative for environmental allergies.   Objective:     Vital Signs (Most Recent):  Temp: 98.4 °F (36.9 °C) (07/15/22 0709)  Pulse: 72 (07/15/22 0709)  Resp: 18 (07/15/22 0709)  BP: (!) 119/58 (07/15/22 0709)  SpO2: (!) 94 % (07/15/22 0709)   Vital Signs (24h Range):  Temp:  [97.5 °F (36.4 °C)-98.5 °F (36.9 °C)] 98.4 °F (36.9 °C)  Pulse:  [69-79] 72  Resp:  [17-20] 18  SpO2:  [92 %-96 %] 94 %  BP: (115-158)/(58-70) 119/58     Weight: 60.7 kg (133 lb 13.1 oz)  Body mass index is 22.27 kg/m².     SpO2: (!) 94 %  O2 Device (Oxygen Therapy): room air      Intake/Output Summary (Last 24 hours) at 7/15/2022 1024  Last data filed at 7/15/2022 1000  Gross per 24 hour   Intake 1440 ml   Output 1000 ml   Net 440 ml       Lines/Drains/Airways       Drain  Duration                  Hemodialysis AV Fistula 04/01/20  2203 Left upper arm 834 days              Peripheral Intravenous Line  Duration                  Peripheral IV - Single Lumen 20 G Right Hand -- days         Peripheral IV - Single Lumen 07/12/22 1046 20 G Right Forearm 2 days         Peripheral IV - Single Lumen 07/12/22 1714 20 G Right Antecubital 2 days                    Physical Exam  Vitals and nursing note reviewed.   Constitutional:       General: She is not in acute distress.     Appearance: Normal appearance. She is well-developed. She is not ill-appearing.   HENT:      Head: Normocephalic and atraumatic.      Nose: Nose normal.      Mouth/Throat:      Mouth: Mucous membranes are moist.   Eyes:      Pupils: Pupils are equal, round, and reactive to light.   Neck:      Thyroid: No thyromegaly.      Vascular: JVD present.      Trachea: No tracheal deviation.   Cardiovascular:      Rate and Rhythm: Normal rate and regular rhythm.      Chest Wall: PMI is not displaced.      Pulses: Intact distal pulses.           Radial pulses are 2+ on the right side and 2+ on the left side.        Dorsalis pedis pulses are 2+ on the right side and 2+ on the left side.      Heart sounds: S1 normal and S2 normal. Heart sounds are distant. No murmur heard.  Pulmonary:      Effort: Pulmonary effort is normal. No respiratory distress.      Breath sounds: Normal breath sounds. No wheezing.   Abdominal:      General: Bowel sounds are normal.      Palpations: Abdomen is soft.   Musculoskeletal:         General: No swelling. Normal range of motion.      Cervical back: Full passive range of motion without pain, normal range of motion and neck supple.      Right ankle: Swelling present.      Left ankle: Swelling present.   Skin:     General: Skin is warm and dry.      Capillary Refill: Capillary refill takes less than 2 seconds.      Nails: There is no clubbing.   Neurological:      General: No focal deficit present.      Mental Status: She is alert and oriented to person, place, and  time.   Psychiatric:         Speech: Speech normal.         Behavior: Behavior normal.         Thought Content: Thought content normal.         Judgment: Judgment normal.       Significant Labs: All pertinent lab results from the last 24 hours have been reviewed. and   Recent Lab Results  (Last 5 results in the past 24 hours)        07/15/22  0924   07/15/22  0543   07/15/22  0541   07/15/22  0144   07/14/22  2101        Anion Gap 13   12     15   14       Baso #   0.02             Basophil %   0.3             BUN 39   39     34   29       Calcium 8.4   8.3     8.5   8.4       Chloride 98   98     95   96       CO2 22   23     25   21       Creatinine 5.2   4.9     4.7   4.0       Differential Method   Automated             eGFR if  9   9     10   12       eGFR if non  8  Comment: Calculation used to obtain the estimated glomerular filtration  rate (eGFR) is the CKD-EPI equation.      8  Comment: Calculation used to obtain the estimated glomerular filtration  rate (eGFR) is the CKD-EPI equation.        9  Comment: Calculation used to obtain the estimated glomerular filtration  rate (eGFR) is the CKD-EPI equation.      10  Comment: Calculation used to obtain the estimated glomerular filtration  rate (eGFR) is the CKD-EPI equation.          Eos #   0.1             Eosinophil %   1.0             Glucose 151   177     229   257       Gran # (ANC)   4.7             Gran %   77.4             Hematocrit   27.8             Hemoglobin   8.9             Immature Grans (Abs)   0.02  Comment: Mild elevation in immature granulocytes is non specific and   can be seen in a variety of conditions including stress response,   acute inflammation, trauma and pregnancy. Correlation with other   laboratory and clinical findings is essential.               Immature Granulocytes   0.3             Lymph #   0.9             Lymph %   14.1             MCH   31.4             MCHC   32.0             MCV   98              Mono #   0.4             Mono %   6.9             MPV   9.8             nRBC   0             Phosphorus   4.6             Platelets   197             POCT Glucose     168           Potassium 3.8   3.8     4.0   4.3       RBC   2.83             RDW   13.7             Sodium 133   133     135   131       WBC   6.09                                    Significant Imaging: Echocardiogram: Transthoracic echo (TTE) complete (Cupid Only):   Results for orders placed or performed during the hospital encounter of 07/12/22   Echo   Result Value Ref Range    BSA 1.68 m2    IVC diameter 1.93 cm    Left Ventricular Outflow Tract Mean Velocity 0.849166737508230 cm/s    Left Ventricular Outflow Tract Mean Gradient 1.94 mmHg    LVIDd 4.43 3.5 - 6.0 cm    IVS 1.15 (A) 0.6 - 1.1 cm    Posterior Wall 0.96 0.6 - 1.1 cm    Ao root annulus 2.78 cm    LVIDs 3.44 2.1 - 4.0 cm    FS 22 28 - 44 %    STJ 2.42 cm    Ascending aorta 2.32 cm    LV mass 161.00 g    LA size 3.85 cm    Left Ventricle Relative Wall Thickness 0.43 cm    AV mean gradient 4 mmHg    AV valve area 1.56 cm2    AV Velocity Ratio 0.79     AV index (prosthetic) 0.78     MV valve area p 1/2 method 6.15 cm2    E/A ratio 1.31     E wave deceleration time 123.601061918481459 msec    IVRT 39.011483432611181 msec    LVOT diameter 1.60 cm    LVOT area 2.0 cm2    LVOT peak lio 1.00 m/s    LVOT peak VTI 19.70 cm    Ao peak lio 1.26 m/s    Ao VTI 25.4 cm    RVOT peak lio 0.62 m/s    RVOT peak VTI 13.3 cm    Mr max lio 6.14 m/s    LVOT stroke volume 39.59 cm3    AV peak gradient 6 mmHg    PV mean gradient 0.85 mmHg    MV Peak E Lio 1.11 m/s    TR Max Lio 4.26 m/s    MV stenosis pressure 1/2 time 35.177720653416524 ms    MV Peak A Lio 0.85 m/s    LV Systolic Volume 48.80 mL    LV Systolic Volume Index 29.0 mL/m2    LV Diastolic Volume 88.87 mL    LV Diastolic Volume Index 52.90 mL/m2    LV Mass Index 96 g/m2    RA Major Axis 3.88 cm    Left Atrium Minor Axis 3.59 cm    Left Atrium  Major Axis 4.08 cm    Triscuspid Valve Regurgitation Peak Gradient 73 mmHg    EF 55 %    Narrative    · Concentric hypertrophy and normal systolic function.  · The estimated ejection fraction is 55%.  · Normal left ventricular diastolic function.  · Normal right ventricular size with normal right ventricular systolic   function.  · Moderate tricuspid regurgitation.  · There is pulmonary hypertension.

## 2022-07-15 NOTE — PROGRESS NOTES
[facility] - Med Surg  Cardiology  Progress Note    Patient Name: Avril Monzon  MRN: [id number]  Admission Date: 7/12/2022  Hospital Length of Stay: 3 days  Code Status: Full Code   Attending Physician: Facundo Dunlap MD   Primary Care Physician: Rose Parks MD  Expected Discharge Date:   Principal Problem:NSTEMI (non-ST elevated myocardial infarction)    Subjective:     Hospital Course:   7/14/2022--Patient seen and examined in room, lying in bed. HD in progress. Denies any cardiac complaints today. Awaiting CABG on Monday.     7/15/2022--patient seen and examined in room, denies any complaints on exam today.       Review of Systems   Constitutional: Positive for malaise/fatigue.   HENT:  Negative for hearing loss and hoarse voice.    Eyes:  Negative for blurred vision and visual disturbance.   Cardiovascular:  Negative for chest pain, claudication, dyspnea on exertion, irregular heartbeat, leg swelling, near-syncope, orthopnea, palpitations, paroxysmal nocturnal dyspnea and syncope.   Respiratory:  Negative for cough, hemoptysis, shortness of breath, sleep disturbances due to breathing, snoring and wheezing.    Endocrine: Negative for heat intolerance.   Hematologic/Lymphatic: Does not bruise/bleed easily.   Skin:  Negative for color change, dry skin and nail changes.   Musculoskeletal:  Positive for arthritis and back pain. Negative for joint pain and myalgias.   Gastrointestinal:  Negative for bloating, abdominal pain, constipation, nausea and vomiting.   Genitourinary:  Negative for dysuria, flank pain, hematuria and hesitancy.   Neurological:  Negative for headaches, light-headedness, loss of balance, numbness, paresthesias and weakness.   Psychiatric/Behavioral:  Negative for altered mental status.    Allergic/Immunologic: Negative for environmental allergies.   Objective:     Vital Signs (Most Recent):  Temp: 98.4 °F (36.9 °C) (07/15/22 0709)  Pulse: 72 (07/15/22 0709)  Resp: 18 (07/15/22 0709)  BP: (!)  119/58 (07/15/22 0709)  SpO2: (!) 94 % (07/15/22 0709)   Vital Signs (24h Range):  Temp:  [97.5 °F (36.4 °C)-98.5 °F (36.9 °C)] 98.4 °F (36.9 °C)  Pulse:  [69-79] 72  Resp:  [17-20] 18  SpO2:  [92 %-96 %] 94 %  BP: (115-158)/(58-70) 119/58     Weight: 60.7 kg (133 lb 13.1 oz)  Body mass index is 22.27 kg/m².     SpO2: (!) 94 %  O2 Device (Oxygen Therapy): room air      Intake/Output Summary (Last 24 hours) at 7/15/2022 1024  Last data filed at 7/15/2022 1000  Gross per 24 hour   Intake 1440 ml   Output 1000 ml   Net 440 ml       Lines/Drains/Airways       Drain  Duration                  Hemodialysis AV Fistula 04/01/20 2203 Left upper arm 834 days              Peripheral Intravenous Line  Duration                  Peripheral IV - Single Lumen 20 G Right Hand -- days         Peripheral IV - Single Lumen 07/12/22 1046 20 G Right Forearm 2 days         Peripheral IV - Single Lumen 07/12/22 1714 20 G Right Antecubital 2 days                    Physical Exam  Vitals and nursing note reviewed.   Constitutional:       General: She is not in acute distress.     Appearance: Normal appearance. She is well-developed. She is not ill-appearing.   HENT:      Head: Normocephalic and atraumatic.      Nose: Nose normal.      Mouth/Throat:      Mouth: Mucous membranes are moist.   Eyes:      Pupils: Pupils are equal, round, and reactive to light.   Neck:      Thyroid: No thyromegaly.      Vascular: JVD present.      Trachea: No tracheal deviation.   Cardiovascular:      Rate and Rhythm: Normal rate and regular rhythm.      Chest Wall: PMI is not displaced.      Pulses: Intact distal pulses.           Radial pulses are 2+ on the right side and 2+ on the left side.        Dorsalis pedis pulses are 2+ on the right side and 2+ on the left side.      Heart sounds: S1 normal and S2 normal. Heart sounds are distant. No murmur heard.  Pulmonary:      Effort: Pulmonary effort is normal. No respiratory distress.      Breath sounds: Normal  breath sounds. No wheezing.   Abdominal:      General: Bowel sounds are normal.      Palpations: Abdomen is soft.   Musculoskeletal:         General: No swelling. Normal range of motion.      Cervical back: Full passive range of motion without pain, normal range of motion and neck supple.      Right ankle: Swelling present.      Left ankle: Swelling present.   Skin:     General: Skin is warm and dry.      Capillary Refill: Capillary refill takes less than 2 seconds.      Nails: There is no clubbing.   Neurological:      General: No focal deficit present.      Mental Status: She is alert and oriented to person, place, and time.   Psychiatric:         Speech: Speech normal.         Behavior: Behavior normal.         Thought Content: Thought content normal.         Judgment: Judgment normal.       Significant Labs: All pertinent lab results from the last 24 hours have been reviewed. and   Recent Lab Results  (Last 5 results in the past 24 hours)        07/15/22  0924   07/15/22  0543   07/15/22  0541   07/15/22  0144   07/14/22  2101        Anion Gap 13   12     15   14       Baso #   0.02             Basophil %   0.3             BUN 39   39     34   29       Calcium 8.4   8.3     8.5   8.4       Chloride 98   98     95   96       CO2 22   23     25   21       Creatinine 5.2   4.9     4.7   4.0       Differential Method   Automated             eGFR if  9   9     10   12       eGFR if non  8  Comment: Calculation used to obtain the estimated glomerular filtration  rate (eGFR) is the CKD-EPI equation.      8  Comment: Calculation used to obtain the estimated glomerular filtration  rate (eGFR) is the CKD-EPI equation.        9  Comment: Calculation used to obtain the estimated glomerular filtration  rate (eGFR) is the CKD-EPI equation.      10  Comment: Calculation used to obtain the estimated glomerular filtration  rate (eGFR) is the CKD-EPI equation.          Eos #   0.1              Eosinophil %   1.0             Glucose 151   177     229   257       Gran # (ANC)   4.7             Gran %   77.4             Hematocrit   27.8             Hemoglobin   8.9             Immature Grans (Abs)   0.02  Comment: Mild elevation in immature granulocytes is non specific and   can be seen in a variety of conditions including stress response,   acute inflammation, trauma and pregnancy. Correlation with other   laboratory and clinical findings is essential.               Immature Granulocytes   0.3             Lymph #   0.9             Lymph %   14.1             MCH   31.4             MCHC   32.0             MCV   98             Mono #   0.4             Mono %   6.9             MPV   9.8             nRBC   0             Phosphorus   4.6             Platelets   197             POCT Glucose     168           Potassium 3.8   3.8     4.0   4.3       RBC   2.83             RDW   13.7             Sodium 133   133     135   131       WBC   6.09                                    Significant Imaging: Echocardiogram: Transthoracic echo (TTE) complete (Cupid Only):   Results for orders placed or performed during the hospital encounter of 07/12/22   Echo   Result Value Ref Range    BSA 1.68 m2    IVC diameter 1.93 cm    Left Ventricular Outflow Tract Mean Velocity 0.636914834082487 cm/s    Left Ventricular Outflow Tract Mean Gradient 1.94 mmHg    LVIDd 4.43 3.5 - 6.0 cm    IVS 1.15 (A) 0.6 - 1.1 cm    Posterior Wall 0.96 0.6 - 1.1 cm    Ao root annulus 2.78 cm    LVIDs 3.44 2.1 - 4.0 cm    FS 22 28 - 44 %    STJ 2.42 cm    Ascending aorta 2.32 cm    LV mass 161.00 g    LA size 3.85 cm    Left Ventricle Relative Wall Thickness 0.43 cm    AV mean gradient 4 mmHg    AV valve area 1.56 cm2    AV Velocity Ratio 0.79     AV index (prosthetic) 0.78     MV valve area p 1/2 method 6.15 cm2    E/A ratio 1.31     E wave deceleration time 123.673709269767333 msec    IVRT 39.290098271808338 msec    LVOT diameter 1.60 cm    LVOT area  2.0 cm2    LVOT peak lio 1.00 m/s    LVOT peak VTI 19.70 cm    Ao peak lio 1.26 m/s    Ao VTI 25.4 cm    RVOT peak lio 0.62 m/s    RVOT peak VTI 13.3 cm    Mr max lio 6.14 m/s    LVOT stroke volume 39.59 cm3    AV peak gradient 6 mmHg    PV mean gradient 0.85 mmHg    MV Peak E Lio 1.11 m/s    TR Max Lio 4.26 m/s    MV stenosis pressure 1/2 time 35.634371163333765 ms    MV Peak A Lio 0.85 m/s    LV Systolic Volume 48.80 mL    LV Systolic Volume Index 29.0 mL/m2    LV Diastolic Volume 88.87 mL    LV Diastolic Volume Index 52.90 mL/m2    LV Mass Index 96 g/m2    RA Major Axis 3.88 cm    Left Atrium Minor Axis 3.59 cm    Left Atrium Major Axis 4.08 cm    Triscuspid Valve Regurgitation Peak Gradient 73 mmHg    EF 55 %    Narrative    · Concentric hypertrophy and normal systolic function.  · The estimated ejection fraction is 55%.  · Normal left ventricular diastolic function.  · Normal right ventricular size with normal right ventricular systolic   function.  · Moderate tricuspid regurgitation.  · There is pulmonary hypertension.        Assessment and Plan:       * NSTEMI (non-ST elevated myocardial infarction)  Plan for CABG on Monday per CTS  Continue ASA BB  Chest pain free    Elevated troponin  77 y.o. female patient with a PMHx of CKD, CAD s/p cardiac stenting, DM2, heart failure, HLD, HTN, NSTEMI, seizure, tobacco dependence, and CVA presents with NSTEMI and CHF.  EKG NSR, ST depression multiple leads. Initial troponin is 12. Pt being dialyzed today.    Continue IV heparin, cardiac cath once CHF stable, being dialyzed today    ESRD (end stage renal disease)  Mgmt per nephrology    Hypertension associated with diabetes  Continue BB        VTE Risk Mitigation (From admission, onward)         Ordered     IP VTE HIGH RISK PATIENT  Once         07/12/22 1637     Place DYANA hose  Until discontinued         07/12/22 1637     Place sequential compression device  Until discontinued         07/12/22 1637     Place sequential  compression device  Until discontinued         07/12/22 1216                Evie Guerrero, JAREN-C  Cardiology  [facility]

## 2022-07-15 NOTE — PROGRESS NOTES
[facility] Medicine  Progress Note    Patient Name: Avril Monzon  MRN: [id number]  Patient Class: IP- Inpatient   Admission Date: 7/12/2022  Length of Stay: 3 days  Attending Physician: Facundo Dunlap MD  Primary Care Provider: Rose Parks MD        Subjective:     Principal Problem:NSTEMI (non-ST elevated myocardial infarction)        HPI:  77 y/o female with a pmh of ESRD on HD, DM2, hyperlipidemia,CAD , Tobacco abuse , HTN   and h/o CVA who was brought to ED by family today due to chest pain  for the last 4 days which has gotten worse .  It is located  in the midsternum , intermittent ,  Pressure like pain ,  no radiation , rated 6/10  associated sx son  and nausea . She was in her normal health of states before Friday . She denies  any  fever , chills , diarrhea , Cough , HA  or  sx . She report missed HD yesterday due to  not feeling well . She went to Urgent care  yesterday , was dx with sinus infection  and d/c home .  ER COURSE:  K 6.1 , Co2 12 , Anion gap 23 , BUN/Cr 78/7.1 ,  , Troponin 12 , BHB 1.8 . CXR show pulmonary congestion . ABG show a PH 7.297   Cardiology and Nephrology consulted from the  ER . Started on insulin and heparin drip   ER VS:   BP Pulse Resp Temp SpO2   106/76 72 16 98 °F (36.7 °C) 96 %   CODE Status : Full code  Pt will be admitted to inpt with a dx of NSTEMI and DKA      Overview/Hospital Course:  76 y/o AAF admitted to ICU with a dx of DKA and NSTEMI . Started on Insulin drio , heparin drip, plavix , asa , BB and statin.  [facility]iology consulted and performed a LHC which show a multivessel ds .  CVT consulted and plan for CABG Monday. As of 7/14/22 patient seen and examined in room, lying in bed. HD in progress. Denies any cardiac complaints today. Awaiting CABG on Monday.   7/15 awaiting cabg on Monday per CTS. Denies chest pain, dyspnea. On hd MWF      Interval History: See hospital course for  today      Review of Systems   Constitutional:  Positive for appetite change (improved) and fatigue.   Respiratory:  Negative for shortness of breath.    Cardiovascular:  Negative for chest pain and leg swelling.   Gastrointestinal:  Negative for abdominal pain, diarrhea, nausea and vomiting.        Indigestion after eating   Genitourinary:  Positive for decreased urine volume.   Neurological:  Positive for weakness.   Psychiatric/Behavioral:  Negative for agitation, behavioral problems, confusion, decreased concentration and dysphoric mood. The patient is not nervous/anxious.    Objective:     Vital Signs (Most Recent):  Temp: 97.8 °F (36.6 °C) (07/15/22 1146)  Pulse: 71 (07/15/22 1146)  Resp: 18 (07/15/22 1146)  BP: 137/62 (07/15/22 1146)  SpO2: 99 % (07/15/22 1146) Vital Signs (24h Range):  Temp:  [97.5 °F (36.4 °C)-98.5 °F (36.9 °C)] 97.8 °F (36.6 °C)  Pulse:  [71-79] 71  Resp:  [17-20] 18  SpO2:  [92 %-99 %] 99 %  BP: (115-158)/(58-70) 137/62     Weight: 60.7 kg (133 lb 13.1 oz)  Body mass index is 22.27 kg/m².    Intake/Output Summary (Last 24 hours) at 7/15/2022 1210  Last data filed at 7/15/2022 1000  Gross per 24 hour   Intake 1200 ml   Output --   Net 1200 ml      Physical Exam  Vitals and nursing note reviewed. Exam conducted with a chaperone present (visitor).   Constitutional:       General: She is not in acute distress.     Appearance: She is ill-appearing. She is not toxic-appearing.   HENT:      Head: Normocephalic and atraumatic.   Cardiovascular:      Rate and Rhythm: Normal rate.   Pulmonary:      Effort: Pulmonary effort is normal. No respiratory distress.   Abdominal:      Palpations: Abdomen is soft.      Tenderness: There is no abdominal tenderness.   Musculoskeletal:      Right lower leg: No edema.      Left lower leg: No edema.      Comments: Left upper arm av fistula   Skin:     General: Skin is warm.   Neurological:      Mental Status: She is alert and oriented to person, place, and time.       Motor: Weakness present.   Psychiatric:         Mood and Affect: Mood normal.         Speech: Speech normal.         Behavior: Behavior normal.       Significant Labs: All pertinent labs within the past 24 hours have been reviewed.  CBC:   Recent Labs   Lab 07/14/22  0524 07/15/22  0543   WBC 6.56  6.73 6.09   HGB 9.4*  9.5* 8.9*   HCT 29.3*  28.4* 27.8*     183 197     CMP:   Recent Labs   Lab 07/15/22  0144 07/15/22  0543 07/15/22  0924   * 133* 133*   K 4.0 3.8 3.8   CL 95 98 98   CO2 25 23 22*   * 177* 151*   BUN 34* 39* 39*   CREATININE 4.7* 4.9* 5.2*   CALCIUM 8.5* 8.3* 8.4*   ANIONGAP 15 12 13   EGFRNONAA 9* 8* 8*         Significant Imaging: I have reviewed all pertinent imaging results/findings within the past 24 hours.  U/S: I have reviewed all pertinent results/findings within the past 24 hours and my personal findings are:  carotid us with no significant stenosis       Assessment/Plan:      * NSTEMI (non-ST elevated myocardial infarction)  Cardiology consulted  Cont Heparin drip , asa , plavix and BB  TTE  Trend troponin level   S/P LHC which multiple vessel ds   CVT consulted     Plan for CABG on Monday   Transfer care post cabg    Metabolic acidosis  Cont HD        DKA (diabetic ketoacidosis)  Start DKA protocol   Cont insulin drip   Monitor  Electrolytes and CBG    RESOLVED  Cont long and short acting insulin   Keep CBG < 180         CAD (coronary artery disease)  Cont asa , bb and statin  S/P LHC which show multiple vessel ds   CVT consulted . Plan for CABG Monday           ESRD (end stage renal disease)  Nephrology consulted for mwf maintenance   HD per nephrology     Hypertension associated with diabetes  Cont beta blocker  Controlled   Hyperglycemia noted  Monitor after hd   Avoid hypoglycemia pending cabg    VTE Risk Mitigation (From admission, onward)         Ordered     IP VTE HIGH RISK PATIENT  Once         07/12/22 1637     Place DYANA hose  Until discontinued          07/12/22 1637     Place sequential compression device  Until discontinued         07/12/22 1637     Place sequential compression device  Until discontinued         07/12/22 1216                Discharge Planning   MARIBELL:      Code Status: Full Code   Is the patient medically ready for discharge?:     Reason for patient still in hospital (select all that apply): Patient trending condition, Laboratory test, Treatment, Consult recommendations and Pending disposition  Discharge Plan A: Home with family                  Facundo Dunlap MD  Department of Hospital Medicine   '[facility]

## 2022-07-15 NOTE — ASSESSMENT & PLAN NOTE
Cardiology consulted  Cont Heparin drip , asa , plavix and BB  TTE  Trend troponin level   S/P C which multiple vessel ds   CVT consulted     Plan for CABG on Monday   Transfer care post cabg

## 2022-07-15 NOTE — ASSESSMENT & PLAN NOTE
Cont beta blocker  Controlled   Hyperglycemia noted  Monitor after hd   Avoid hypoglycemia pending cabg

## 2022-07-15 NOTE — CONSULTS
[facility] - Emergency Dept.  Nephrology  Consult Note    Patient Name: Avril Monzon  MRN: [id number]  Admission Date: 7/12/2022  Hospital Length of Stay: 3 days  Attending Provider: Facundo Dunlap MD   Primary Care Physician: Rose Parks MD  Principal Problem:NSTEMI (non-ST elevated myocardial infarction)    Consults  Subjective:     HPI:     77-year-old female with history of end-stage renal disease.  Presented to the emergency department with chest pain and shortness of breath.  She did not have dialysis yesterday due to not feeling well.  Nephrology has been consulted for dialysis maintenance.  Patient was seen in the emergency department.  In bed resting comfortably.  No acute distress noted.  States that she is feeling better since she arrived in the emergency department.  She still has some shortness of breath however.  Her chest discomfort has improved.    07/13/2022:  Patient was seen in her hospital room.  In bed resting comfortably.  No acute distress noted.  States that she is feeling better since having dialysis yesterday.  No longer has chest discomfort or shortness of breath.    07/14/2022:  Patient was seen on dialysis.  In bed resting comfortably.  No acute distress noted.    07/15/2022:  Patient was seen in her hospital room.  In bed resting comfortably.  No acute distress noted.    Past Medical History:   Diagnosis Date    Acute hypoxemic respiratory failure 11/26/2018    Anemia     Arthritis     back, hand, knees    Back pain     Cataract     CKD stage G4/A3, GFR 15 - 29 and albumin creatinine ratio >300 mg/g     GFR 40% Jan 2014 and 33% ub 3/2014 ([facility])    Coronary artery disease involving native coronary artery of native heart 11/30/2018    Diabetic retinopathy     DM (diabetes mellitus) type II controlled, neurological manifestation     Exudative age-related macular degeneration of left eye with active choroidal neovascularization 2/5/2019    GERD (gastroesophageal reflux disease)      Glaucoma     Heart failure     Hyperlipidemia     Hypertension     Non-ST elevation MI (NSTEMI) 11/28/2018    NSTEMI (non-ST elevated myocardial infarction) 11/27/2018    Peripheral neuropathy     Polyneuropathy     Proteinuria     >6 mo     Seizures     [facility] 1/2014 -dick CT NRI showed small vessel    Stroke 2012,2014    Tobacco dependence     Type 2 diabetes with peripheral circulatory disorder, controlled        Past Surgical History:   Procedure Laterality Date    BREAST LUMPECTOMY Left     benign, when patient was 15 years old    BREAST MASS EXCISION      ,benign, age 15.    CATHETERIZATION OF BOTH LEFT AND RIGHT HEART N/A 11/28/2018    Procedure: CATHETERIZATION, HEART, BOTH LEFT AND RIGHT;  Surgeon: Michelle Holman MD;  Location: [facility] CATH LAB;  Service: Cardiology;  Laterality: N/A;    CATHETERIZATION OF BOTH LEFT AND RIGHT HEART N/A 11/30/2018    Procedure: CATHETERIZATION, HEART, BOTH LEFT AND RIGHT;  Surgeon: Michelle Holman MD;  Location: [facility] CATH LAB;  Service: Cardiology;  Laterality: N/A;    HYSTERECTOMY  1982    INSERTION OF SHUNT      LEFT HEART CATHETERIZATION Left 12/3/2018    Procedure: CATHETERIZATION, HEART, LEFT;  Surgeon: Michelle Holman MD;  Location: [facility] CATH LAB;  Service: Cardiology;  Laterality: Left;  LAD ATHERECTOMY STENT/ FEMORAL APPROACH    LEFT HEART CATHETERIZATION Left 7/13/2022    Procedure: CATHETERIZATION, HEART, LEFT;  Surgeon: Abhi Sloan MD;  Location: [facility] CATH LAB;  Service: Cardiology;  Laterality: Left;    OOPHORECTOMY      wrist cyst Right 1980s    dorsal wrist cyst       Review of patient's allergies indicates:   Allergen Reactions    Codeine Swelling    Darvon [propoxyphene] Swelling    Atorvastatin      Muscle twitching     Current Facility-Administered Medications   Medication Frequency    0.9%  NaCl infusion Continuous PRN    acetaminophen tablet 650 mg Q4H PRN    aspirin EC tablet 81 mg Daily    atropine injection 0.5 mg PRN     dextrose 10 % infusion PRN    dextrose 10 % infusion PRN    dextrose 10% bolus 125 mL PRN    dextrose 10% bolus 250 mL PRN    famotidine tablet 20 mg Daily    glucagon (human recombinant) injection 1 mg PRN    glucose chewable tablet 16 g PRN    glucose chewable tablet 24 g PRN    insulin aspart U-100 pen 0-5 Units QID (AC + HS) PRN    insulin detemir U-100 pen 6 Units QHS    metoprolol tartrate (LOPRESSOR) tablet 50 mg BID    mupirocin 2 % ointment BID    nitroGLYCERIN SL tablet 0.4 mg Q5 Min PRN    promethazine (PHENERGAN) 12.5 mg in dextrose 5 % 50 mL IVPB Q6H PRN    senna-docusate 8.6-50 mg per tablet 2 tablet Daily    sodium chloride 0.9% bolus 250 mL PRN    sodium chloride 0.9% flush 10 mL PRN     Family History     Problem Relation (Age of Onset)    Cancer Maternal Grandmother    Diabetes Mother    Heart disease Mother    Hyperlipidemia Mother    Hypertension Mother    Muscular dystrophy Son        Tobacco Use    Smoking status: Former Smoker     Packs/day: 1.50     Years: 30.00     Pack years: 45.00     Types: Cigarettes     Quit date: 1990     Years since quittin.5    Smokeless tobacco: Former User   Substance and Sexual Activity    Alcohol use: No     Alcohol/week: 0.0 standard drinks    Drug use: No    Sexual activity: Not Currently     Review of Systems   Constitutional: Negative.    HENT: Negative.    Eyes: Negative.    Respiratory: Positive for shortness of breath.    Cardiovascular: Positive for chest pain.   Gastrointestinal: Negative.    Genitourinary: Negative.    Musculoskeletal: Negative.    Skin: Negative.    Neurological: Negative.      Objective:     Vital Signs (Most Recent):  Temp: 98.4 °F (36.9 °C) (07/15/22 0709)  Pulse: 72 (07/15/22 0709)  Resp: 18 (07/15/22 0709)  BP: (!) 119/58 (07/15/22 0709)  SpO2: (!) 94 % (07/15/22 0709)  O2 Device (Oxygen Therapy): room air (22) Vital Signs (24h Range):  Temp:  [97.5 °F (36.4 °C)-98.5 °F (36.9 °C)] 98.4  °F (36.9 °C)  Pulse:  [69-79] 72  Resp:  [17-20] 18  SpO2:  [92 %-96 %] 94 %  BP: (115-158)/(58-70) 119/58     Weight: 60.7 kg (133 lb 13.1 oz) (07/15/22 0354)  Body mass index is 22.27 kg/m².  Body surface area is 1.67 meters squared.    I/O last 3 completed shifts:  In: 1680 [P.O.:1680]  Out: 1000 [Other:1000]    Physical Exam  Constitutional:       Appearance: Normal appearance.   HENT:      Head: Normocephalic and atraumatic.   Eyes:      General: No scleral icterus.     Extraocular Movements: Extraocular movements intact.      Pupils: Pupils are equal, round, and reactive to light.   Cardiovascular:      Rate and Rhythm: Normal rate and regular rhythm.   Pulmonary:      Effort: Pulmonary effort is normal.      Breath sounds: Rales present.   Abdominal:      General: Abdomen is flat.      Palpations: There is no mass.   Musculoskeletal:      Right lower leg: No edema.      Left lower leg: No edema.   Skin:     General: Skin is warm and dry.   Neurological:      General: No focal deficit present.      Mental Status: She is alert and oriented to person, place, and time.   Psychiatric:         Mood and Affect: Mood normal.         Behavior: Behavior normal.         Significant Labs:  BMP:   Recent Labs   Lab 07/12/22 2020 07/13/22  0200 07/15/22  0543   *   < > 177*      < > 133*   K 3.8   < > 3.8   CL 95   < > 98   CO2 26   < > 23   BUN 35*   < > 39*   CREATININE 3.6*   < > 4.9*   CALCIUM 9.3   < > 8.3*   MG 2.1  --   --     < > = values in this interval not displayed.     CMP:   Recent Labs   Lab 07/12/22  1048 07/12/22  2020 07/15/22  0543   *   < > 177*   CALCIUM 9.2   < > 8.3*   ALBUMIN 3.5  --   --    PROT 7.3  --   --       < > 133*   K 6.1*   < > 3.8   CO2 12*   < > 23      < > 98   BUN 78*   < > 39*   CREATININE 7.1*   < > 4.9*   ALKPHOS 115  --   --    ALT 90*  --   --    *  --   --    BILITOT 0.6  --   --     < > = values in this interval not displayed.     All  labs within the past 24 hours have been reviewed.    Significant Imaging:  Labs: Reviewed  Reviewed    Assessment/Plan:     Active Diagnoses:    Diagnosis Date Noted POA    PRINCIPAL PROBLEM:  NSTEMI (non-ST elevated myocardial infarction) [I21.4] 07/12/2022 Yes    DKA (diabetic ketoacidosis) [E11.10] 07/12/2022 Yes    Metabolic acidosis [E87.2] 07/12/2022 Yes    CAD (coronary artery disease) [I25.10] 11/30/2018 Yes    ESRD (end stage renal disease) [N18.6]  Yes    Hypertension associated with diabetes [E11.59, I15.2]  Yes     Chronic      Problems Resolved During this Admission:    Diagnosis Date Noted Date Resolved POA    Hyperkalemia [E87.5] 07/12/2022 07/14/2022 Yes       1. End-stage renal disease:  Patient received dialysis yesterday.  Approximately 1 L ultrafiltration.  Uneventful.    Plans for coronary artery bypass surgery noted for next Monday.  Will likely postpone dialysis tomorrow and dialyze Sunday so that she can be optimized from a dialysis standpoint prior to surgery.    2. Chest pain:  Coronary artery bypass surgery planned for next week.      Thank you for your consult.     Adrian Ramirez MD  Nephrology  [facility] - Emergency Dept.

## 2022-07-16 LAB
ALBUMIN SERPL BCP-MCNC: 2.8 G/DL (ref 3.5–5.2)
ANION GAP SERPL CALC-SCNC: 14 MMOL/L (ref 8–16)
ANION GAP SERPL CALC-SCNC: 15 MMOL/L (ref 8–16)
ANION GAP SERPL CALC-SCNC: 15 MMOL/L (ref 8–16)
ANION GAP SERPL CALC-SCNC: 16 MMOL/L (ref 8–16)
ANION GAP SERPL CALC-SCNC: 17 MMOL/L (ref 8–16)
BASOPHILS # BLD AUTO: 0.01 K/UL (ref 0–0.2)
BASOPHILS NFR BLD: 0.2 % (ref 0–1.9)
BUN SERPL-MCNC: 47 MG/DL (ref 8–23)
BUN SERPL-MCNC: 48 MG/DL (ref 8–23)
BUN SERPL-MCNC: 48 MG/DL (ref 8–23)
BUN SERPL-MCNC: 52 MG/DL (ref 8–23)
BUN SERPL-MCNC: 52 MG/DL (ref 8–23)
BUN SERPL-MCNC: 56 MG/DL (ref 8–23)
BUN SERPL-MCNC: 56 MG/DL (ref 8–23)
CALCIUM SERPL-MCNC: 8.5 MG/DL (ref 8.7–10.5)
CALCIUM SERPL-MCNC: 8.6 MG/DL (ref 8.7–10.5)
CALCIUM SERPL-MCNC: 8.6 MG/DL (ref 8.7–10.5)
CALCIUM SERPL-MCNC: 8.7 MG/DL (ref 8.7–10.5)
CALCIUM SERPL-MCNC: 8.8 MG/DL (ref 8.7–10.5)
CHLORIDE SERPL-SCNC: 93 MMOL/L (ref 95–110)
CHLORIDE SERPL-SCNC: 93 MMOL/L (ref 95–110)
CHLORIDE SERPL-SCNC: 95 MMOL/L (ref 95–110)
CHLORIDE SERPL-SCNC: 95 MMOL/L (ref 95–110)
CHLORIDE SERPL-SCNC: 96 MMOL/L (ref 95–110)
CO2 SERPL-SCNC: 19 MMOL/L (ref 23–29)
CO2 SERPL-SCNC: 21 MMOL/L (ref 23–29)
CO2 SERPL-SCNC: 22 MMOL/L (ref 23–29)
CO2 SERPL-SCNC: 23 MMOL/L (ref 23–29)
CO2 SERPL-SCNC: 24 MMOL/L (ref 23–29)
CREAT SERPL-MCNC: 6.9 MG/DL (ref 0.5–1.4)
CREAT SERPL-MCNC: 7.1 MG/DL (ref 0.5–1.4)
CREAT SERPL-MCNC: 7.1 MG/DL (ref 0.5–1.4)
CREAT SERPL-MCNC: 7.6 MG/DL (ref 0.5–1.4)
CREAT SERPL-MCNC: 8 MG/DL (ref 0.5–1.4)
CREAT SERPL-MCNC: 8.3 MG/DL (ref 0.5–1.4)
CREAT SERPL-MCNC: 8.7 MG/DL (ref 0.5–1.4)
DIFFERENTIAL METHOD: ABNORMAL
EOSINOPHIL # BLD AUTO: 0.1 K/UL (ref 0–0.5)
EOSINOPHIL NFR BLD: 1.5 % (ref 0–8)
ERYTHROCYTE [DISTWIDTH] IN BLOOD BY AUTOMATED COUNT: 13.3 % (ref 11.5–14.5)
EST. GFR  (AFRICAN AMERICAN): 5 ML/MIN/1.73 M^2
EST. GFR  (AFRICAN AMERICAN): 6 ML/MIN/1.73 M^2
EST. GFR  (NON AFRICAN AMERICAN): 4 ML/MIN/1.73 M^2
EST. GFR  (NON AFRICAN AMERICAN): 5 ML/MIN/1.73 M^2
GLUCOSE SERPL-MCNC: 166 MG/DL (ref 70–110)
GLUCOSE SERPL-MCNC: 191 MG/DL (ref 70–110)
GLUCOSE SERPL-MCNC: 191 MG/DL (ref 70–110)
GLUCOSE SERPL-MCNC: 195 MG/DL (ref 70–110)
GLUCOSE SERPL-MCNC: 207 MG/DL (ref 70–110)
GLUCOSE SERPL-MCNC: 216 MG/DL (ref 70–110)
GLUCOSE SERPL-MCNC: 241 MG/DL (ref 70–110)
HCT VFR BLD AUTO: 27.8 % (ref 37–48.5)
HGB BLD-MCNC: 8.9 G/DL (ref 12–16)
IMM GRANULOCYTES # BLD AUTO: 0.01 K/UL (ref 0–0.04)
IMM GRANULOCYTES NFR BLD AUTO: 0.2 % (ref 0–0.5)
LYMPHOCYTES # BLD AUTO: 1 K/UL (ref 1–4.8)
LYMPHOCYTES NFR BLD: 17.9 % (ref 18–48)
MCH RBC QN AUTO: 31.3 PG (ref 27–31)
MCHC RBC AUTO-ENTMCNC: 32 G/DL (ref 32–36)
MCV RBC AUTO: 98 FL (ref 82–98)
MONOCYTES # BLD AUTO: 0.4 K/UL (ref 0.3–1)
MONOCYTES NFR BLD: 8 % (ref 4–15)
NEUTROPHILS # BLD AUTO: 4 K/UL (ref 1.8–7.7)
NEUTROPHILS NFR BLD: 72.2 % (ref 38–73)
NRBC BLD-RTO: 0 /100 WBC
PHOSPHATE SERPL-MCNC: 5.6 MG/DL (ref 2.7–4.5)
PHOSPHATE SERPL-MCNC: 5.6 MG/DL (ref 2.7–4.5)
PLATELET # BLD AUTO: 225 K/UL (ref 150–450)
PMV BLD AUTO: 9.7 FL (ref 9.2–12.9)
POCT GLUCOSE: 201 MG/DL (ref 70–110)
POCT GLUCOSE: 203 MG/DL (ref 70–110)
POCT GLUCOSE: 225 MG/DL (ref 70–110)
POCT GLUCOSE: 299 MG/DL (ref 70–110)
POTASSIUM SERPL-SCNC: 3.9 MMOL/L (ref 3.5–5.1)
POTASSIUM SERPL-SCNC: 4 MMOL/L (ref 3.5–5.1)
POTASSIUM SERPL-SCNC: 4.1 MMOL/L (ref 3.5–5.1)
POTASSIUM SERPL-SCNC: 4.1 MMOL/L (ref 3.5–5.1)
POTASSIUM SERPL-SCNC: 4.2 MMOL/L (ref 3.5–5.1)
POTASSIUM SERPL-SCNC: 4.3 MMOL/L (ref 3.5–5.1)
POTASSIUM SERPL-SCNC: 4.3 MMOL/L (ref 3.5–5.1)
RBC # BLD AUTO: 2.84 M/UL (ref 4–5.4)
SODIUM SERPL-SCNC: 130 MMOL/L (ref 136–145)
SODIUM SERPL-SCNC: 130 MMOL/L (ref 136–145)
SODIUM SERPL-SCNC: 131 MMOL/L (ref 136–145)
SODIUM SERPL-SCNC: 133 MMOL/L (ref 136–145)
SODIUM SERPL-SCNC: 134 MMOL/L (ref 136–145)
WBC # BLD AUTO: 5.49 K/UL (ref 3.9–12.7)

## 2022-07-16 PROCEDURE — 21400001 HC TELEMETRY ROOM

## 2022-07-16 PROCEDURE — 25000003 PHARM REV CODE 250: Performed by: INTERNAL MEDICINE

## 2022-07-16 PROCEDURE — 80048 BASIC METABOLIC PNL TOTAL CA: CPT | Mod: 91 | Performed by: INTERNAL MEDICINE

## 2022-07-16 PROCEDURE — 36415 COLL VENOUS BLD VENIPUNCTURE: CPT | Performed by: INTERNAL MEDICINE

## 2022-07-16 PROCEDURE — 99232 PR SUBSEQUENT HOSPITAL CARE,LEVL II: ICD-10-PCS | Mod: ,,, | Performed by: INTERNAL MEDICINE

## 2022-07-16 PROCEDURE — 80069 RENAL FUNCTION PANEL: CPT | Performed by: INTERNAL MEDICINE

## 2022-07-16 PROCEDURE — 99232 SBSQ HOSP IP/OBS MODERATE 35: CPT | Mod: ,,, | Performed by: INTERNAL MEDICINE

## 2022-07-16 PROCEDURE — 85025 COMPLETE CBC W/AUTO DIFF WBC: CPT | Performed by: INTERNAL MEDICINE

## 2022-07-16 RX ORDER — HYDROCODONE BITARTRATE AND ACETAMINOPHEN 500; 5 MG/1; MG/1
TABLET ORAL
Status: DISCONTINUED | OUTPATIENT
Start: 2022-07-16 | End: 2022-07-21

## 2022-07-16 RX ADMIN — INSULIN ASPART 1 UNITS: 100 INJECTION, SOLUTION INTRAVENOUS; SUBCUTANEOUS at 09:07

## 2022-07-16 RX ADMIN — INSULIN ASPART 2 UNITS: 100 INJECTION, SOLUTION INTRAVENOUS; SUBCUTANEOUS at 06:07

## 2022-07-16 RX ADMIN — MUPIROCIN: 20 OINTMENT TOPICAL at 09:07

## 2022-07-16 RX ADMIN — METOPROLOL TARTRATE 50 MG: 50 TABLET, FILM COATED ORAL at 09:07

## 2022-07-16 RX ADMIN — NEPHROCAP 1 CAPSULE: 1 CAP ORAL at 01:07

## 2022-07-16 RX ADMIN — INSULIN ASPART 2 UNITS: 100 INJECTION, SOLUTION INTRAVENOUS; SUBCUTANEOUS at 05:07

## 2022-07-16 RX ADMIN — INSULIN ASPART 3 UNITS: 100 INJECTION, SOLUTION INTRAVENOUS; SUBCUTANEOUS at 01:07

## 2022-07-16 RX ADMIN — ASPIRIN 81 MG: 81 TABLET, COATED ORAL at 10:07

## 2022-07-16 RX ADMIN — DOCUSATE SODIUM AND SENNOSIDES 2 TABLET: 8.6; 5 TABLET, FILM COATED ORAL at 10:07

## 2022-07-16 RX ADMIN — METOPROLOL TARTRATE 50 MG: 50 TABLET, FILM COATED ORAL at 10:07

## 2022-07-16 RX ADMIN — FAMOTIDINE 20 MG: 20 TABLET ORAL at 10:07

## 2022-07-16 RX ADMIN — INSULIN DETEMIR 6 UNITS: 100 INJECTION, SOLUTION SUBCUTANEOUS at 09:07

## 2022-07-16 RX ADMIN — ACETAMINOPHEN 650 MG: 325 TABLET ORAL at 09:07

## 2022-07-16 NOTE — SUBJECTIVE & OBJECTIVE
Interval History: See hospital course for today      Review of Systems   Constitutional:  Negative for activity change, appetite change, fatigue and fever.   Respiratory:  Negative for cough and shortness of breath.    Cardiovascular:  Negative for chest pain and leg swelling.   Gastrointestinal:  Negative for abdominal pain, diarrhea, nausea and vomiting.   Neurological:  Negative for weakness.   Psychiatric/Behavioral:  Negative for confusion, decreased concentration and dysphoric mood. The patient is not nervous/anxious.    Objective:     Vital Signs (Most Recent):  Temp: 97.9 °F (36.6 °C) (07/16/22 0742)  Pulse: 72 (07/16/22 0742)  Resp: 17 (07/16/22 0742)  BP: 124/60 (07/16/22 0742)  SpO2: 97 % (07/16/22 0742) Vital Signs (24h Range):  Temp:  [97.9 °F (36.6 °C)-98.7 °F (37.1 °C)] 97.9 °F (36.6 °C)  Pulse:  [72-77] 72  Resp:  [17-18] 17  SpO2:  [96 %-100 %] 97 %  BP: (124-140)/(60-65) 124/60     Weight: 61.9 kg (136 lb 7.4 oz)  Body mass index is 22.71 kg/m².    Intake/Output Summary (Last 24 hours) at 7/16/2022 1154  Last data filed at 7/16/2022 1000  Gross per 24 hour   Intake 1440 ml   Output --   Net 1440 ml      Physical Exam  Vitals and nursing note reviewed. Exam conducted with a chaperone present (family, nephrology).   Constitutional:       General: She is not in acute distress.     Appearance: She is ill-appearing. She is not toxic-appearing.   HENT:      Head: Normocephalic and atraumatic.   Cardiovascular:      Rate and Rhythm: Normal rate.   Pulmonary:      Effort: Pulmonary effort is normal. No respiratory distress.   Abdominal:      Palpations: Abdomen is soft.      Tenderness: There is no abdominal tenderness.   Musculoskeletal:      Right lower leg: No edema.      Left lower leg: No edema.      Comments: Left upper arm, av fistula  Palpable thrill   Skin:     General: Skin is warm.   Neurological:      Mental Status: She is alert and oriented to person, place, and time.      Motor: Weakness  present.   Psychiatric:         Mood and Affect: Mood normal.         Behavior: Behavior normal.       Significant Labs: All pertinent labs within the past 24 hours have been reviewed.  CBC:   Recent Labs   Lab 07/15/22  0543 07/16/22  0519   WBC 6.09 5.49   HGB 8.9* 8.9*   HCT 27.8* 27.8*    225     CMP:   Recent Labs   Lab 07/16/22  0235 07/16/22  0519 07/16/22  0923   * 133*  133* 134*   K 4.0 4.1  4.1 3.9   CL 96 96  96 95   CO2 23 21*  21* 24   * 191*  191* 166*   BUN 47* 48*  48* 52*   CREATININE 6.9* 7.1*  7.1* 7.6*   CALCIUM 8.5* 8.5*  8.5* 8.7   ALBUMIN  --  2.8*  --    ANIONGAP 14 16  16 15   EGFRNONAA 5* 5*  5* 5*         Significant Imaging: I have reviewed all pertinent imaging results/findings within the past 24 hours.

## 2022-07-16 NOTE — PROGRESS NOTES
Nephrology  Progress Note    Patient Name: Avril Monzon  MRN: [id number]  Admission Date: 7/12/2022  Hospital Length of Stay: 4 days  Attending Provider: Facundo Dunlap MD   Primary Care Physician: Rose Parks MD  Principal Problem:NSTEMI (non-ST elevated myocardial infarction)   Reason for Consult: Management of ESRD   Primary Nephrologist: Dr. Hanson       Subjective:     Interval History: 78 yo female with ESRD on MWF for CABG on Monday 7/16  - last HD Thursday 1L UF    Review of patient's allergies indicates:   Allergen Reactions    Codeine Swelling    Darvon [propoxyphene] Swelling    Atorvastatin      Muscle twitching     Current Facility-Administered Medications   Medication Frequency    0.9%  NaCl infusion (for blood administration) Q24H PRN    0.9%  NaCl infusion Continuous PRN    acetaminophen tablet 650 mg Q4H PRN    aspirin EC tablet 81 mg Daily    atropine injection 0.5 mg PRN    dextrose 10 % infusion PRN    dextrose 10 % infusion PRN    dextrose 10% bolus 125 mL PRN    dextrose 10% bolus 250 mL PRN    famotidine tablet 20 mg Daily    glucagon (human recombinant) injection 1 mg PRN    glucose chewable tablet 16 g PRN    glucose chewable tablet 24 g PRN    insulin aspart U-100 pen 0-5 Units QID (AC + HS) PRN    insulin detemir U-100 pen 6 Units QHS    metoprolol tartrate (LOPRESSOR) tablet 50 mg BID    mupirocin 2 % ointment BID    nitroGLYCERIN SL tablet 0.4 mg Q5 Min PRN    promethazine (PHENERGAN) 12.5 mg in dextrose 5 % 50 mL IVPB Q6H PRN    senna-docusate 8.6-50 mg per tablet 2 tablet Daily    sodium chloride 0.9% bolus 250 mL PRN    sodium chloride 0.9% flush 10 mL PRN       Objective:     Vital Signs (Most Recent):  Temp: 97.9 °F (36.6 °C) (07/16/22 0742)  Pulse: 72 (07/16/22 0742)  Resp: 17 (07/16/22 0742)  BP: 124/60 (07/16/22 0742)  SpO2: 97 % (07/16/22 0742)  O2 Device (Oxygen Therapy): room air (07/15/22 2300) Vital Signs (24h Range):  Temp:  [97.8 °F (36.6  °C)-98.7 °F (37.1 °C)] 97.9 °F (36.6 °C)  Pulse:  [71-77] 72  Resp:  [17-18] 17  SpO2:  [96 %-100 %] 97 %  BP: (124-140)/(60-65) 124/60     Weight: 61.9 kg (136 lb 7.4 oz) (07/16/22 0003)  Body mass index is 22.71 kg/m².  Body surface area is 1.68 meters squared.    I/O last 3 completed shifts:  In: 1560 [P.O.:1560]  Out: -     Physical Exam  Vitals and nursing note reviewed.   Constitutional:       General: She is not in acute distress.  HENT:      Head: Atraumatic.   Eyes:      General: No scleral icterus.  Cardiovascular:      Rate and Rhythm: Normal rate.      Heart sounds:     No friction rub.   Pulmonary:      Effort: No respiratory distress.   Abdominal:      Palpations: Abdomen is soft.      Tenderness: There is no abdominal tenderness.   Musculoskeletal:      Right lower leg: No edema.      Left lower leg: No edema.   Skin:     General: Skin is warm and dry.   Neurological:      Mental Status: She is alert and oriented to person, place, and time.   Psychiatric:         Mood and Affect: Mood normal.         Behavior: Behavior normal.         Thought Content: Thought content normal.         Judgment: Judgment normal.         Significant Labs: reviewed  Significant Imaging: reviewed    Assessment/Plan:     Active Diagnoses:    Diagnosis Date Noted POA    PRINCIPAL PROBLEM:  NSTEMI (non-ST elevated myocardial infarction) [I21.4] 07/12/2022 Yes    DKA (diabetic ketoacidosis) [E11.10] 07/12/2022 Yes    Metabolic acidosis [E87.2] 07/12/2022 Yes    CAD (coronary artery disease) [I25.10] 11/30/2018 Yes    ESRD (end stage renal disease) [N18.6]  Yes    Hypertension associated with diabetes [E11.59, I15.2]  Yes     Chronic      Problems Resolved During this Admission:    Diagnosis Date Noted Date Resolved POA    Hyperkalemia [E87.5] 07/12/2022 07/14/2022 Yes       ESRD on MWF  - plan HD tomorrow, day prior to CABG    Nutrition  - add Novasource  - add Dialysis vitamin     Anemia of ESRD  - Epo with  HD    Mineral and Bone Disease  - low phos diet   - check phos in am       Shreyas Salcedo MD  Nephrology

## 2022-07-16 NOTE — PROGRESS NOTES
[facility] Medicine  Progress Note    Patient Name: Avril Monzon  MRN: [id number]  Patient Class: IP- Inpatient   Admission Date: 7/12/2022  Length of Stay: 4 days  Attending Physician: Facundo Dunlap MD  Primary Care Provider: Rose Parks MD        Subjective:     Principal Problem:NSTEMI (non-ST elevated myocardial infarction)        HPI:  77 y/o female with a pmh of ESRD on HD, DM2, hyperlipidemia,CAD , Tobacco abuse , HTN   and h/o CVA who was brought to ED by family today due to chest pain  for the last 4 days which has gotten worse .  It is located  in the midsternum , intermittent ,  Pressure like pain ,  no radiation , rated 6/10  associated sx son  and nausea . She was in her normal health of states before Friday . She denies  any  fever , chills , diarrhea , Cough , HA  or  sx . She report missed HD yesterday due to  not feeling well . She went to Urgent care  yesterday , was dx with sinus infection  and d/c home .  ER COURSE:  K 6.1 , Co2 12 , Anion gap 23 , BUN/Cr 78/7.1 ,  , Troponin 12 , BHB 1.8 . CXR show pulmonary congestion . ABG show a PH 7.297   Cardiology and Nephrology consulted from the  ER . Started on insulin and heparin drip   ER VS:   BP Pulse Resp Temp SpO2   106/76 72 16 98 °F (36.7 °C) 96 %   CODE Status : Full code  Pt will be admitted to inpt with a dx of NSTEMI and DKA      Overview/Hospital Course:  78 y/o AAF admitted to ICU with a dx of DKA and NSTEMI . Started on Insulin drio , heparin drip, plavix , asa , BB and statin.  [facility] Cardiology consulted and performed a LHC which show a multivessel ds .  CVT consulted and plan for CABG Monday. As of 7/14/22 patient seen and examined in room, lying in bed. HD in progress. Denies any cardiac complaints today. Awaiting CABG on Monday.   7/15 awaiting cabg on Monday per CTS. Denies chest pain, dyspnea. On hd MWF  7/16 awaiting cabg on Monday per CTS. Indigestion  persists, characterized as tightness upper abdomen after eating, modestly improved with famotidine. Pmh gerd. Consider adding proton pump inhibitor. Denies chest pain, dyspnea.       Interval History: See hospital course for today      Review of Systems   Constitutional:  Negative for activity change, appetite change, fatigue and fever.   Respiratory:  Negative for cough and shortness of breath.    Cardiovascular:  Negative for chest pain and leg swelling.   Gastrointestinal:  Negative for abdominal pain, diarrhea, nausea and vomiting.   Neurological:  Negative for weakness.   Psychiatric/Behavioral:  Negative for confusion, decreased concentration and dysphoric mood. The patient is not nervous/anxious.    Objective:     Vital Signs (Most Recent):  Temp: 97.9 °F (36.6 °C) (07/16/22 0742)  Pulse: 72 (07/16/22 0742)  Resp: 17 (07/16/22 0742)  BP: 124/60 (07/16/22 0742)  SpO2: 97 % (07/16/22 0742) Vital Signs (24h Range):  Temp:  [97.9 °F (36.6 °C)-98.7 °F (37.1 °C)] 97.9 °F (36.6 °C)  Pulse:  [72-77] 72  Resp:  [17-18] 17  SpO2:  [96 %-100 %] 97 %  BP: (124-140)/(60-65) 124/60     Weight: 61.9 kg (136 lb 7.4 oz)  Body mass index is 22.71 kg/m².    Intake/Output Summary (Last 24 hours) at 7/16/2022 1154  Last data filed at 7/16/2022 1000  Gross per 24 hour   Intake 1440 ml   Output --   Net 1440 ml      Physical Exam  Vitals and nursing note reviewed. Exam conducted with a chaperone present (family, nephrology).   Constitutional:       General: She is not in acute distress.     Appearance: She is ill-appearing. She is not toxic-appearing.   HENT:      Head: Normocephalic and atraumatic.   Cardiovascular:      Rate and Rhythm: Normal rate.   Pulmonary:      Effort: Pulmonary effort is normal. No respiratory distress.   Abdominal:      Palpations: Abdomen is soft.      Tenderness: There is no abdominal tenderness.   Musculoskeletal:      Right lower leg: No edema.      Left lower leg: No edema.      Comments: Left upper  arm, av fistula  Palpable thrill   Skin:     General: Skin is warm.   Neurological:      Mental Status: She is alert and oriented to person, place, and time.      Motor: Weakness present.   Psychiatric:         Mood and Affect: Mood normal.         Behavior: Behavior normal.       Significant Labs: All pertinent labs within the past 24 hours have been reviewed.  CBC:   Recent Labs   Lab 07/15/22  0543 07/16/22  0519   WBC 6.09 5.49   HGB 8.9* 8.9*   HCT 27.8* 27.8*    225     CMP:   Recent Labs   Lab 07/16/22  0235 07/16/22  0519 07/16/22  0923   * 133*  133* 134*   K 4.0 4.1  4.1 3.9   CL 96 96  96 95   CO2 23 21*  21* 24   * 191*  191* 166*   BUN 47* 48*  48* 52*   CREATININE 6.9* 7.1*  7.1* 7.6*   CALCIUM 8.5* 8.5*  8.5* 8.7   ALBUMIN  --  2.8*  --    ANIONGAP 14 16  16 15   EGFRNONAA 5* 5*  5* 5*         Significant Imaging: I have reviewed all pertinent imaging results/findings within the past 24 hours.      Assessment/Plan:      * NSTEMI (non-ST elevated myocardial infarction)  Cardiology consulted  Cont Heparin drip , asa , plavix and BB  TTE  Trend troponin level   S/P LHC which multiple vessel ds   CVT consulted     Plan for CABG on Monday   Transfer care post cabg    Metabolic acidosis  Cont HD        DKA (diabetic ketoacidosis)  Start DKA protocol   Cont insulin drip   Monitor  Electrolytes and CBG    RESOLVED  Cont long and short acting insulin   Keep CBG < 180         CAD (coronary artery disease)  Cont asa , bb and statin  S/P LHC which show multiple vessel ds   CVT consulted . Plan for CABG Monday           ESRD (end stage renal disease)  Nephrology consulted for mwf maintenance   HD per nephrology     Hypertension associated with diabetes  Cont beta blocker  Controlled   Hyperglycemia noted  Monitor after hd   Avoid hypoglycemia pending cabg    VTE Risk Mitigation (From admission, onward)         Ordered     IP VTE HIGH RISK PATIENT  Once         07/12/22 1637     Place  DYANA hose  Until discontinued         07/12/22 1637     Place sequential compression device  Until discontinued         07/12/22 1637     Place sequential compression device  Until discontinued         07/12/22 1216                Discharge Planning   MARIBELL:      Code Status: Full Code   Is the patient medically ready for discharge?:     Reason for patient still in hospital (select all that apply): Patient trending condition, Treatment, Consult recommendations and Pending disposition  Discharge Plan A: Home with family                  Facundo Dunlap MD  Department of Hospital Medicine   [facility] - Med Surg

## 2022-07-16 NOTE — PROGRESS NOTES
Subjective:      Patient ID: Avril Monzon is a 77 y.o. female.    Chief Complaint: Chest Pain (Seen at urgent care yesterday; c/o sharp pain with shortness of breath; hx of MI three years ago)    HPI: Chief Complaint/Reason for Admission:  NSTEMI     History of Present Illness:  77-year-old female with a history of end-stage renal disease diabetes mellitus hypertension.  History of coronary artery disease status post PCI in 2018 was having symptoms of chest pain for 4 days when she came to the emergency room had troponin elevation she had a left heart catheterization which showed significant disease involving her coronaries in the fall of totally occluded right with filling of her posterior descending artery from left-to-right collaterals and disease involving her proximal left anterior descending artery and circumflex artery.  The patient is on intermittent hemodialysis and she missed 1 of her dialysis last week when the symptoms started.  Patient on intermittent dialysis.    Review of patient's allergies indicates:   Allergen Reactions    Codeine Swelling    Darvon [propoxyphene] Swelling    Atorvastatin      Muscle twitching       Past Medical History:   Diagnosis Date    Acute hypoxemic respiratory failure 11/26/2018    Anemia     Arthritis     back, hand, knees    Back pain     Cataract     CKD stage G4/A3, GFR 15 - 29 and albumin creatinine ratio >300 mg/g     GFR 40% Jan 2014 and 33% ub 3/2014 ([facility])    Coronary artery disease involving native coronary artery of native heart 11/30/2018    Diabetic retinopathy     DM (diabetes mellitus) type II controlled, neurological manifestation     Exudative age-related macular degeneration of left eye with active choroidal neovascularization 2/5/2019    GERD (gastroesophageal reflux disease)     Glaucoma     Heart failure     Hyperlipidemia     Hypertension     Non-ST elevation MI (NSTEMI) 11/28/2018    NSTEMI (non-ST elevated myocardial infarction)  2018    Peripheral neuropathy     Polyneuropathy     Proteinuria     >6 mo     Seizures     [facility] 2014 -dick CT NRI showed small vessel    Stroke     Tobacco dependence     Type 2 diabetes with peripheral circulatory disorder, controlled        Family History   Problem Relation Age of Onset    Diabetes Mother     Hyperlipidemia Mother     Hypertension Mother     Heart disease Mother         MI    Muscular dystrophy Son     Cancer Maternal Grandmother         colon       Social History     Socioeconomic History    Marital status:    Tobacco Use    Smoking status: Former Smoker     Packs/day: 1.50     Years: 30.00     Pack years: 45.00     Types: Cigarettes     Quit date: 1990     Years since quittin.5    Smokeless tobacco: Former User   Substance and Sexual Activity    Alcohol use: No     Alcohol/week: 0.0 standard drinks    Drug use: No    Sexual activity: Not Currently       Past Surgical History:   Procedure Laterality Date    BREAST LUMPECTOMY Left     benign, when patient was 15 years old    BREAST MASS EXCISION      ,benign, age 15.    CATHETERIZATION OF BOTH LEFT AND RIGHT HEART N/A 2018    Procedure: CATHETERIZATION, HEART, BOTH LEFT AND RIGHT;  Surgeon: Michelle Holman MD;  Location: [facility] CATH LAB;  Service: Cardiology;  Laterality: N/A;    CATHETERIZATION OF BOTH LEFT AND RIGHT HEART N/A 2018    Procedure: CATHETERIZATION, HEART, BOTH LEFT AND RIGHT;  Surgeon: Michelle Holman MD;  Location: [facility] CATH LAB;  Service: Cardiology;  Laterality: N/A;    HYSTERECTOMY  1982    INSERTION OF SHUNT      LEFT HEART CATHETERIZATION Left 12/3/2018    Procedure: CATHETERIZATION, HEART, LEFT;  Surgeon: Michelle Holman MD;  Location: [facility] CATH LAB;  Service: Cardiology;  Laterality: Left;  LAD ATHERECTOMY STENT/ FEMORAL APPROACH    LEFT HEART CATHETERIZATION Left 2022    Procedure: CATHETERIZATION, HEART, LEFT;  Surgeon: Abhi Sloan MD;   "Location: [facility] C[facility] LAB;  Service: Cardiology;  Laterality: Left;    OOPHORECTOMY      wrist cyst Right 1980s    dorsal wrist cyst       Review of Systems   Constitutional: Positive for fatigue. Negative for activity change and appetite change.   HENT: Negative for dental problem, nosebleeds and sore throat.    Eyes: Negative for discharge and visual disturbance.   Respiratory: Positive for shortness of breath. Negative for cough, chest tightness and stridor.    Cardiovascular: Positive for leg swelling.   Gastrointestinal: Positive for diarrhea. Negative for abdominal distention and abdominal pain.   Genitourinary: Negative for difficulty urinating and dysuria.   Musculoskeletal: Positive for myalgias. Negative for arthralgias, back pain and joint swelling.   Allergic/Immunologic: Negative for environmental allergies.   Neurological: Negative for dizziness, syncope and headaches.   Hematological: Does not bruise/bleed easily.   Psychiatric/Behavioral: Negative for behavioral problems.          Objective:   /60   Pulse 72   Temp 97.9 °F (36.6 °C)   Resp 17   Ht 5' 5" (1.651 m)   Wt 61.9 kg (136 lb 7.4 oz)   LMP  (LMP Unknown)   SpO2 97%   BMI 22.71 kg/m²     US Carotid Bilateral  Narrative: EXAMINATION:  US CAROTID BILATERAL    CLINICAL HISTORY:  Heart failure, unspecifiedPeripheral vascular diasease;    COMPARISON:  None    FINDINGS:  Right common carotid:    Peak systolic velocity 67 cm/sec.    Right internal carotid artery: Heterogeneous plaque is seen in the bulb    Peak systolic velocity: 94 cm/sec.    IC/CC ratio:  1.4.    Left common carotid:    Peak systolic velocity 56 cm/sec.    Left internal carotid artery: Heterogeneous plaque is seen in the bulb    Peak systolic velocity 81 cm/sec.    IC/CC ratio 1.3.    Both vertebral arteries demonstrate antegrade flow.  Impression: No significant stenosis.    **Categories of stenosis (0-15%, 16-49%, 50-69% and 70-99% ) are based on published criteria " that have been internally validated.  Degree of stenosis is based on NASCET criteria and refers to the degree of luminal diameter narrowing as a percentage of the normal ICA distal to the stenosis and any area of post stenotic dilation.    Electronically signed by: Sebastien Trent MD  Date:    07/14/2022  Time:    10:33  Cardiac catheterization  · The pre-procedure left ventricular end diastolic pressure was 31.  · The post-procedure left ventricular end diastolic pressure was 32.  · The ejection fraction was calculated to be 40%.  · The Prox RCA to Mid RCA lesion was 100% stenosed.  · The Ost Cx to Prox Cx lesion was 99% stenosed.  · The Prox LAD to Mid LAD lesion was 75% stenosed.  · The estimated blood loss was none.  · There was three vessel coronary artery disease.     The procedure log was documented by Documenter: Daryl Lamb RN and   verified by Abhi Sloan MD.    Date: 7/14/2022  Time: 4:57 AM         Physical Exam  Vitals and nursing note reviewed.   Constitutional:       Appearance: She is ill-appearing.   HENT:      Head: Normocephalic.      Mouth/Throat:      Mouth: Mucous membranes are moist.   Eyes:      Extraocular Movements: Extraocular movements intact.      Pupils: Pupils are equal, round, and reactive to light.   Cardiovascular:      Rate and Rhythm: Normal rate and regular rhythm.   Pulmonary:      Effort: Pulmonary effort is normal.      Breath sounds: Normal breath sounds.   Abdominal:      General: Abdomen is flat.      Palpations: Abdomen is soft.   Musculoskeletal:         General: Normal range of motion.      Cervical back: Normal range of motion and neck supple.   Skin:     General: Skin is warm.      Capillary Refill: Capillary refill takes less than 2 seconds.   Neurological:      General: No focal deficit present.      Mental Status: She is alert and oriented to person, place, and time.   Psychiatric:         Mood and Affect: Mood normal.         Assessment:     1. Coronary  artery disease involving native heart, unspecified vessel or lesion type, unspecified whether angina present    2. Chest pain    3. NSTEMI (non-ST elevated myocardial infarction)    4. Diabetic ketoacidosis without coma associated with other specified diabetes mellitus    5. Hyperkalemia    6. ESRD (end stage renal disease)    7. Elevated troponin I level    8. Congestive heart failure, unspecified HF chronicity, unspecified heart failure type    9. Elevated troponin          Plan   Plan for CABG x2 on Monday  Patient has a calcified aorta and calcified coronary after reviewing her CT scan under films at this time a reasonable size will be to do off pump bypass with LIMA to LAD and a vein graft to the circumflex I discussed the risks and benefits and alternatives of surgery including myocardial infarction and stroke with the patient the patient agreed to proceed she will be considered as a high-risk candidate for both stroke as well as myocardial infarction.  2 units of pack cells to be transfused during her pre surgery dialysis since he is chronically anemic          Sanju Locke MD  [facility]  [place]

## 2022-07-17 PROBLEM — E11.10 DKA (DIABETIC KETOACIDOSIS): Status: RESOLVED | Noted: 2022-07-12 | Resolved: 2022-07-17

## 2022-07-17 LAB
ABO + RH BLD: NORMAL
ALLENS TEST: ABNORMAL
ANION GAP SERPL CALC-SCNC: 12 MMOL/L (ref 8–16)
ANION GAP SERPL CALC-SCNC: 14 MMOL/L (ref 8–16)
ANION GAP SERPL CALC-SCNC: 15 MMOL/L (ref 8–16)
ANION GAP SERPL CALC-SCNC: 16 MMOL/L (ref 8–16)
ANION GAP SERPL CALC-SCNC: 16 MMOL/L (ref 8–16)
BASOPHILS # BLD AUTO: 0.01 K/UL (ref 0–0.2)
BASOPHILS # BLD AUTO: 0.02 K/UL (ref 0–0.2)
BASOPHILS NFR BLD: 0.2 % (ref 0–1.9)
BASOPHILS NFR BLD: 0.4 % (ref 0–1.9)
BLD GP AB SCN CELLS X3 SERPL QL: NORMAL
BLD PROD TYP BPU: NORMAL
BLD PROD TYP BPU: NORMAL
BLOOD UNIT EXPIRATION DATE: NORMAL
BLOOD UNIT EXPIRATION DATE: NORMAL
BLOOD UNIT TYPE CODE: 6200
BLOOD UNIT TYPE CODE: 6200
BLOOD UNIT TYPE: NORMAL
BLOOD UNIT TYPE: NORMAL
BUN SERPL-MCNC: 26 MG/DL (ref 8–23)
BUN SERPL-MCNC: 33 MG/DL (ref 8–23)
BUN SERPL-MCNC: 36 MG/DL (ref 8–23)
BUN SERPL-MCNC: 57 MG/DL (ref 8–23)
BUN SERPL-MCNC: 60 MG/DL (ref 8–23)
CALCIUM SERPL-MCNC: 8.5 MG/DL (ref 8.7–10.5)
CALCIUM SERPL-MCNC: 8.5 MG/DL (ref 8.7–10.5)
CALCIUM SERPL-MCNC: 8.6 MG/DL (ref 8.7–10.5)
CALCIUM SERPL-MCNC: 8.7 MG/DL (ref 8.7–10.5)
CALCIUM SERPL-MCNC: 8.9 MG/DL (ref 8.7–10.5)
CHLORIDE SERPL-SCNC: 92 MMOL/L (ref 95–110)
CHLORIDE SERPL-SCNC: 92 MMOL/L (ref 95–110)
CHLORIDE SERPL-SCNC: 97 MMOL/L (ref 95–110)
CHLORIDE SERPL-SCNC: 97 MMOL/L (ref 95–110)
CHLORIDE SERPL-SCNC: 99 MMOL/L (ref 95–110)
CO2 SERPL-SCNC: 18 MMOL/L (ref 23–29)
CO2 SERPL-SCNC: 21 MMOL/L (ref 23–29)
CO2 SERPL-SCNC: 22 MMOL/L (ref 23–29)
CODING SYSTEM: NORMAL
CODING SYSTEM: NORMAL
CREAT SERPL-MCNC: 4.2 MG/DL (ref 0.5–1.4)
CREAT SERPL-MCNC: 5.8 MG/DL (ref 0.5–1.4)
CREAT SERPL-MCNC: 6.1 MG/DL (ref 0.5–1.4)
CREAT SERPL-MCNC: 8.8 MG/DL (ref 0.5–1.4)
CREAT SERPL-MCNC: 9 MG/DL (ref 0.5–1.4)
DELSYS: ABNORMAL
DIFFERENTIAL METHOD: ABNORMAL
DIFFERENTIAL METHOD: ABNORMAL
DISPENSE STATUS: NORMAL
DISPENSE STATUS: NORMAL
EOSINOPHIL # BLD AUTO: 0.1 K/UL (ref 0–0.5)
EOSINOPHIL # BLD AUTO: 0.1 K/UL (ref 0–0.5)
EOSINOPHIL NFR BLD: 1.8 % (ref 0–8)
EOSINOPHIL NFR BLD: 2.1 % (ref 0–8)
ERYTHROCYTE [DISTWIDTH] IN BLOOD BY AUTOMATED COUNT: 13.1 % (ref 11.5–14.5)
ERYTHROCYTE [DISTWIDTH] IN BLOOD BY AUTOMATED COUNT: 13.2 % (ref 11.5–14.5)
EST. GFR  (AFRICAN AMERICAN): 11 ML/MIN/1.73 M^2
EST. GFR  (AFRICAN AMERICAN): 4 ML/MIN/1.73 M^2
EST. GFR  (AFRICAN AMERICAN): 5 ML/MIN/1.73 M^2
EST. GFR  (AFRICAN AMERICAN): 7 ML/MIN/1.73 M^2
EST. GFR  (AFRICAN AMERICAN): 8 ML/MIN/1.73 M^2
EST. GFR  (NON AFRICAN AMERICAN): 10 ML/MIN/1.73 M^2
EST. GFR  (NON AFRICAN AMERICAN): 4 ML/MIN/1.73 M^2
EST. GFR  (NON AFRICAN AMERICAN): 4 ML/MIN/1.73 M^2
EST. GFR  (NON AFRICAN AMERICAN): 6 ML/MIN/1.73 M^2
EST. GFR  (NON AFRICAN AMERICAN): 7 ML/MIN/1.73 M^2
GLUCOSE SERPL-MCNC: 116 MG/DL (ref 70–110)
GLUCOSE SERPL-MCNC: 164 MG/DL (ref 70–110)
GLUCOSE SERPL-MCNC: 185 MG/DL (ref 70–110)
HCO3 UR-SCNC: 20.6 MMOL/L (ref 24–28)
HCT VFR BLD AUTO: 26.3 % (ref 37–48.5)
HCT VFR BLD AUTO: 28.2 % (ref 37–48.5)
HGB BLD-MCNC: 8.7 G/DL (ref 12–16)
HGB BLD-MCNC: 8.9 G/DL (ref 12–16)
IMM GRANULOCYTES # BLD AUTO: 0.01 K/UL (ref 0–0.04)
IMM GRANULOCYTES # BLD AUTO: 0.02 K/UL (ref 0–0.04)
IMM GRANULOCYTES NFR BLD AUTO: 0.2 % (ref 0–0.5)
IMM GRANULOCYTES NFR BLD AUTO: 0.4 % (ref 0–0.5)
LYMPHOCYTES # BLD AUTO: 1 K/UL (ref 1–4.8)
LYMPHOCYTES # BLD AUTO: 1.1 K/UL (ref 1–4.8)
LYMPHOCYTES NFR BLD: 19.4 % (ref 18–48)
LYMPHOCYTES NFR BLD: 20.5 % (ref 18–48)
MCH RBC QN AUTO: 31.3 PG (ref 27–31)
MCH RBC QN AUTO: 32.6 PG (ref 27–31)
MCHC RBC AUTO-ENTMCNC: 30.9 G/DL (ref 32–36)
MCHC RBC AUTO-ENTMCNC: 33.8 G/DL (ref 32–36)
MCV RBC AUTO: 101 FL (ref 82–98)
MCV RBC AUTO: 96 FL (ref 82–98)
MONOCYTES # BLD AUTO: 0.5 K/UL (ref 0.3–1)
MONOCYTES # BLD AUTO: 0.5 K/UL (ref 0.3–1)
MONOCYTES NFR BLD: 8.5 % (ref 4–15)
MONOCYTES NFR BLD: 9.3 % (ref 4–15)
NEUTROPHILS # BLD AUTO: 3.5 K/UL (ref 1.8–7.7)
NEUTROPHILS # BLD AUTO: 3.8 K/UL (ref 1.8–7.7)
NEUTROPHILS NFR BLD: 68.6 % (ref 38–73)
NEUTROPHILS NFR BLD: 68.6 % (ref 38–73)
NRBC BLD-RTO: 0 /100 WBC
NRBC BLD-RTO: 0 /100 WBC
NUM UNITS TRANS PACKED RBC: NORMAL
NUM UNITS TRANS PACKED RBC: NORMAL
PCO2 BLDA: 34.3 MMHG (ref 35–45)
PH SMN: 7.39 [PH] (ref 7.35–7.45)
PHOSPHATE SERPL-MCNC: 6.6 MG/DL (ref 2.7–4.5)
PLATELET # BLD AUTO: 234 K/UL (ref 150–450)
PLATELET # BLD AUTO: 243 K/UL (ref 150–450)
PMV BLD AUTO: 9.3 FL (ref 9.2–12.9)
PMV BLD AUTO: 9.6 FL (ref 9.2–12.9)
PO2 BLDA: 79 MMHG (ref 80–100)
POC BE: -4 MMOL/L
POC SATURATED O2: 95 % (ref 95–100)
POCT GLUCOSE: 115 MG/DL (ref 70–110)
POCT GLUCOSE: 192 MG/DL (ref 70–110)
POCT GLUCOSE: 193 MG/DL (ref 70–110)
POCT GLUCOSE: 197 MG/DL (ref 70–110)
POTASSIUM SERPL-SCNC: 3.5 MMOL/L (ref 3.5–5.1)
POTASSIUM SERPL-SCNC: 4.1 MMOL/L (ref 3.5–5.1)
POTASSIUM SERPL-SCNC: 4.3 MMOL/L (ref 3.5–5.1)
POTASSIUM SERPL-SCNC: 4.6 MMOL/L (ref 3.5–5.1)
POTASSIUM SERPL-SCNC: 4.6 MMOL/L (ref 3.5–5.1)
RBC # BLD AUTO: 2.73 M/UL (ref 4–5.4)
RBC # BLD AUTO: 2.78 M/UL (ref 4–5.4)
SAMPLE: ABNORMAL
SITE: ABNORMAL
SODIUM SERPL-SCNC: 130 MMOL/L (ref 136–145)
SODIUM SERPL-SCNC: 132 MMOL/L (ref 136–145)
SODIUM SERPL-SCNC: 133 MMOL/L (ref 136–145)
WBC # BLD AUTO: 5.15 K/UL (ref 3.9–12.7)
WBC # BLD AUTO: 5.5 K/UL (ref 3.9–12.7)

## 2022-07-17 PROCEDURE — 25000003 PHARM REV CODE 250: Performed by: INTERNAL MEDICINE

## 2022-07-17 PROCEDURE — 99232 PR SUBSEQUENT HOSPITAL CARE,LEVL II: ICD-10-PCS | Mod: ,,, | Performed by: INTERNAL MEDICINE

## 2022-07-17 PROCEDURE — 36600 WITHDRAWAL OF ARTERIAL BLOOD: CPT

## 2022-07-17 PROCEDURE — 86850 RBC ANTIBODY SCREEN: CPT | Performed by: FAMILY MEDICINE

## 2022-07-17 PROCEDURE — 36430 TRANSFUSION BLD/BLD COMPNT: CPT

## 2022-07-17 PROCEDURE — 36415 COLL VENOUS BLD VENIPUNCTURE: CPT | Performed by: FAMILY MEDICINE

## 2022-07-17 PROCEDURE — 99900035 HC TECH TIME PER 15 MIN (STAT)

## 2022-07-17 PROCEDURE — 85025 COMPLETE CBC W/AUTO DIFF WBC: CPT | Mod: 91 | Performed by: INTERNAL MEDICINE

## 2022-07-17 PROCEDURE — 86920 COMPATIBILITY TEST SPIN: CPT | Performed by: THORACIC SURGERY (CARDIOTHORACIC VASCULAR SURGERY)

## 2022-07-17 PROCEDURE — 80053 COMPREHEN METABOLIC PANEL: CPT | Performed by: THORACIC SURGERY (CARDIOTHORACIC VASCULAR SURGERY)

## 2022-07-17 PROCEDURE — 84100 ASSAY OF PHOSPHORUS: CPT | Performed by: INTERNAL MEDICINE

## 2022-07-17 PROCEDURE — 85025 COMPLETE CBC W/AUTO DIFF WBC: CPT | Mod: 91 | Performed by: THORACIC SURGERY (CARDIOTHORACIC VASCULAR SURGERY)

## 2022-07-17 PROCEDURE — 94799 UNLISTED PULMONARY SVC/PX: CPT

## 2022-07-17 PROCEDURE — 99232 SBSQ HOSP IP/OBS MODERATE 35: CPT | Mod: ,,, | Performed by: INTERNAL MEDICINE

## 2022-07-17 PROCEDURE — 80100016 HC MAINTENANCE HEMODIALYSIS

## 2022-07-17 PROCEDURE — 99900031 HC PATIENT EDUCATION (STAT)

## 2022-07-17 PROCEDURE — 36415 COLL VENOUS BLD VENIPUNCTURE: CPT | Performed by: INTERNAL MEDICINE

## 2022-07-17 PROCEDURE — P9016 RBC LEUKOCYTES REDUCED: HCPCS | Performed by: THORACIC SURGERY (CARDIOTHORACIC VASCULAR SURGERY)

## 2022-07-17 PROCEDURE — 21400001 HC TELEMETRY ROOM

## 2022-07-17 PROCEDURE — 90935 PR HEMODIALYSIS, ONE EVALUATION: ICD-10-PCS | Mod: ,,, | Performed by: INTERNAL MEDICINE

## 2022-07-17 PROCEDURE — 82803 BLOOD GASES ANY COMBINATION: CPT

## 2022-07-17 PROCEDURE — 80048 BASIC METABOLIC PNL TOTAL CA: CPT | Mod: XB | Performed by: INTERNAL MEDICINE

## 2022-07-17 PROCEDURE — 25000003 PHARM REV CODE 250: Performed by: FAMILY MEDICINE

## 2022-07-17 PROCEDURE — 36415 COLL VENOUS BLD VENIPUNCTURE: CPT | Performed by: THORACIC SURGERY (CARDIOTHORACIC VASCULAR SURGERY)

## 2022-07-17 PROCEDURE — 85025 COMPLETE CBC W/AUTO DIFF WBC: CPT | Performed by: EMERGENCY MEDICINE

## 2022-07-17 PROCEDURE — 63600175 PHARM REV CODE 636 W HCPCS: Performed by: FAMILY MEDICINE

## 2022-07-17 PROCEDURE — 86920 COMPATIBILITY TEST SPIN: CPT | Performed by: FAMILY MEDICINE

## 2022-07-17 PROCEDURE — 90935 HEMODIALYSIS ONE EVALUATION: CPT | Mod: ,,, | Performed by: INTERNAL MEDICINE

## 2022-07-17 RX ORDER — FUROSEMIDE 10 MG/ML
80 INJECTION INTRAMUSCULAR; INTRAVENOUS ONCE
Status: COMPLETED | OUTPATIENT
Start: 2022-07-17 | End: 2022-07-17

## 2022-07-17 RX ORDER — HYDRALAZINE HYDROCHLORIDE 25 MG/1
25 TABLET, FILM COATED ORAL EVERY 12 HOURS
Status: DISCONTINUED | OUTPATIENT
Start: 2022-07-17 | End: 2022-07-19

## 2022-07-17 RX ORDER — ACETYLCYSTEINE 100 MG/ML
4 SOLUTION ORAL; RESPIRATORY (INHALATION) 4 TIMES DAILY
Status: DISCONTINUED | OUTPATIENT
Start: 2022-07-17 | End: 2022-07-17

## 2022-07-17 RX ORDER — CHLORHEXIDINE GLUCONATE ORAL RINSE 1.2 MG/ML
10 SOLUTION DENTAL
Status: DISCONTINUED | OUTPATIENT
Start: 2022-07-17 | End: 2022-07-18 | Stop reason: HOSPADM

## 2022-07-17 RX ADMIN — INSULIN DETEMIR 6 UNITS: 100 INJECTION, SOLUTION SUBCUTANEOUS at 08:07

## 2022-07-17 RX ADMIN — FUROSEMIDE 80 MG: 10 INJECTION, SOLUTION INTRAMUSCULAR; INTRAVENOUS at 04:07

## 2022-07-17 RX ADMIN — FAMOTIDINE 20 MG: 20 TABLET ORAL at 02:07

## 2022-07-17 RX ADMIN — MUPIROCIN: 20 OINTMENT TOPICAL at 02:07

## 2022-07-17 RX ADMIN — NEPHROCAP 1 CAPSULE: 1 CAP ORAL at 02:07

## 2022-07-17 RX ADMIN — METOPROLOL TARTRATE 50 MG: 50 TABLET, FILM COATED ORAL at 02:07

## 2022-07-17 RX ADMIN — METOPROLOL TARTRATE 50 MG: 50 TABLET, FILM COATED ORAL at 08:07

## 2022-07-17 RX ADMIN — ASPIRIN 81 MG: 81 TABLET, COATED ORAL at 02:07

## 2022-07-17 RX ADMIN — HYDRALAZINE HYDROCHLORIDE 25 MG: 25 TABLET, FILM COATED ORAL at 08:07

## 2022-07-17 NOTE — SUBJECTIVE & OBJECTIVE
Interval History: See hospital course for today      Review of Systems   Constitutional:  Positive for fatigue. Negative for activity change, appetite change and fever.   Respiratory:  Negative for shortness of breath.    Cardiovascular:  Negative for chest pain and leg swelling.   Gastrointestinal:  Negative for abdominal pain, nausea and vomiting.   Genitourinary:         Still urinates   Neurological:  Positive for weakness.   Psychiatric/Behavioral:  Negative for agitation, confusion, decreased concentration, dysphoric mood and sleep disturbance. The patient is not nervous/anxious.    Objective:     Vital Signs (Most Recent):  Temp: 96 °F (35.6 °C) (07/17/22 0417)  Pulse: 69 (07/17/22 0417)  Resp: 18 (07/17/22 0417)  BP: 125/60 (07/17/22 0418)  SpO2: (!) 93 % (07/17/22 0417)   Vital Signs (24h Range):  Temp:  [96 °F (35.6 °C)-98.3 °F (36.8 °C)] 96 °F (35.6 °C)  Pulse:  [69-74] 69  Resp:  [18-20] 18  SpO2:  [93 %-99 %] 93 %  BP: (125-151)/(60-69) 125/60     Weight: 61.9 kg (136 lb 7.4 oz)  Body mass index is 22.71 kg/m².    Intake/Output Summary (Last 24 hours) at 7/17/2022 1118  Last data filed at 7/17/2022 1054  Gross per 24 hour   Intake 1200 ml   Output 3100 ml   Net -1900 ml      Physical Exam  Vitals and nursing note reviewed. Exam conducted with a chaperone present (dialysis nurse).   Constitutional:       General: She is not in acute distress.     Appearance: She is ill-appearing. She is not toxic-appearing.   HENT:      Head: Normocephalic and atraumatic.   Cardiovascular:      Rate and Rhythm: Normal rate.   Pulmonary:      Effort: Pulmonary effort is normal. No respiratory distress.   Abdominal:      Palpations: Abdomen is soft.      Tenderness: There is no abdominal tenderness.   Musculoskeletal:      Right lower leg: No edema.      Left lower leg: No edema.      Comments: Av fistula, upper left arm   Skin:     General: Skin is warm.      Capillary Refill: Capillary refill takes 2 to 3 seconds.    Neurological:      Mental Status: She is alert and oriented to person, place, and time.      Motor: Weakness present.   Psychiatric:         Mood and Affect: Mood normal.         Behavior: Behavior normal.       Significant Labs: All pertinent labs within the past 24 hours have been reviewed.  CBC:   Recent Labs   Lab 07/16/22  0519 07/17/22 0211 07/17/22  0541   WBC 5.49 5.50 5.15   HGB 8.9* 8.9* 8.7*   HCT 27.8* 26.3* 28.2*    234 243     CMP:   Recent Labs   Lab 07/16/22  0519 07/16/22  0923 07/17/22 0211 07/17/22  0541 07/17/22  0916   *  133*   < > 130* 130* 133*   K 4.1  4.1   < > 4.1 4.3 3.5   CL 96  96   < > 92* 92* 99   CO2 21*  21*   < > 22* 22* 22*   *  191*   < > 185* 164* 116*   BUN 48*  48*   < > 60* 57* 26*   CREATININE 7.1*  7.1*   < > 9.0* 8.8* 4.2*   CALCIUM 8.5*  8.5*   < > 8.5* 8.5* 8.7   ALBUMIN 2.8*  --   --   --   --    ANIONGAP 16  16   < > 16 16 12   EGFRNONAA 5*  5*   < > 4* 4* 10*    < > = values in this interval not displayed.         Significant Imaging: I have reviewed all pertinent imaging results/findings within the past 24 hours.

## 2022-07-17 NOTE — PROGRESS NOTES
Subjective:      Patient ID: Avril Monzon is a 77 y.o. female.    Chief Complaint: Chest Pain (Seen at urgent care yesterday; c/o sharp pain with shortness of breath; hx of MI three years ago)    HPI: Chief Complaint/Reason for Admission:  NSTEMI     History of Present Illness:  77-year-old female with a history of end-stage renal disease diabetes mellitus hypertension.  History of coronary artery disease status post PCI in 2018 was having symptoms of chest pain for 4 days when she came to the emergency room had troponin elevation she had a left heart catheterization which showed significant disease involving her coronaries in the fall of totally occluded right with filling of her posterior descending artery from left-to-right collaterals and disease involving her proximal left anterior descending artery and circumflex artery.  The patient is on intermittent hemodialysis and she missed 1 of her dialysis last week when the symptoms started.  Patient on intermittent dialysis.    Review of patient's allergies indicates:   Allergen Reactions    Codeine Swelling    Darvon [propoxyphene] Swelling    Atorvastatin      Muscle twitching       Past Medical History:   Diagnosis Date    Acute hypoxemic respiratory failure 11/26/2018    Anemia     Arthritis     back, hand, knees    Back pain     Cataract     CKD stage G4/A3, GFR 15 - 29 and albumin creatinine ratio >300 mg/g     GFR 40% Jan 2014 and 33% ub 3/2014 ([facility])    Coronary artery disease involving native coronary artery of native heart 11/30/2018    Diabetic retinopathy     DM (diabetes mellitus) type II controlled, neurological manifestation     Exudative age-related macular degeneration of left eye with active choroidal neovascularization 2/5/2019    GERD (gastroesophageal reflux disease)     Glaucoma     Heart failure     Hyperlipidemia     Hypertension     Non-ST elevation MI (NSTEMI) 11/28/2018    NSTEMI (non-ST elevated myocardial infarction)  2018    Peripheral neuropathy     Polyneuropathy     Proteinuria     >6 mo     Seizures     [facility] 2014 -dick CT NRI showed small vessel    Stroke     Tobacco dependence     Type 2 diabetes with peripheral circulatory disorder, controlled        Family History   Problem Relation Age of Onset    Diabetes Mother     Hyperlipidemia Mother     Hypertension Mother     Heart disease Mother         MI    Muscular dystrophy Son     Cancer Maternal Grandmother         colon       Social History     Socioeconomic History    Marital status:    Tobacco Use    Smoking status: Former Smoker     Packs/day: 1.50     Years: 30.00     Pack years: 45.00     Types: Cigarettes     Quit date: 1990     Years since quittin.5    Smokeless tobacco: Former User   Substance and Sexual Activity    Alcohol use: No     Alcohol/week: 0.0 standard drinks    Drug use: No    Sexual activity: Not Currently       Past Surgical History:   Procedure Laterality Date    BREAST LUMPECTOMY Left     benign, when patient was 15 years old    BREAST MASS EXCISION      ,benign, age 15.    CATHETERIZATION OF BOTH LEFT AND RIGHT HEART N/A 2018    Procedure: CATHETERIZATION, HEART, BOTH LEFT AND RIGHT;  Surgeon: Michelle Holman MD;  Location: [facility] CATH LAB;  Service: Cardiology;  Laterality: N/A;    CATHETERIZATION OF BOTH LEFT AND RIGHT HEART N/A 2018    Procedure: CATHETERIZATION, HEART, BOTH LEFT AND RIGHT;  Surgeon: Michelle Holman MD;  Location: [facility] CATH LAB;  Service: Cardiology;  Laterality: N/A;    HYSTERECTOMY  1982    INSERTION OF SHUNT      LEFT HEART CATHETERIZATION Left 12/3/2018    Procedure: CATHETERIZATION, HEART, LEFT;  Surgeon: Michelle Holman MD;  Location: [facility] CATH LAB;  Service: Cardiology;  Laterality: Left;  LAD ATHERECTOMY STENT/ FEMORAL APPROACH    LEFT HEART CATHETERIZATION Left 2022    Procedure: CATHETERIZATION, HEART, LEFT;  Surgeon: Abhi Sloan MD;   "Location: [facility] CATH LAB;  Service: Cardiology;  Laterality: Left;    OOPHORECTOMY      wrist cyst Right 1980s    dorsal wrist cyst       Review of Systems   Constitutional: Positive for fatigue. Negative for activity change and appetite change.   HENT: Negative for dental problem, nosebleeds and sore throat.    Eyes: Negative for discharge and visual disturbance.   Respiratory: Positive for shortness of breath. Negative for cough, chest tightness and stridor.    Cardiovascular: Positive for leg swelling.   Gastrointestinal: Positive for diarrhea. Negative for abdominal distention and abdominal pain.   Genitourinary: Negative for difficulty urinating and dysuria.   Musculoskeletal: Positive for myalgias. Negative for arthralgias, back pain and joint swelling.   Allergic/Immunologic: Negative for environmental allergies.   Neurological: Negative for dizziness, syncope and headaches.   Hematological: Does not bruise/bleed easily.   Psychiatric/Behavioral: Negative for behavioral problems.          Objective:   /60 (BP Location: Right arm, Patient Position: Lying)   Pulse 69   Temp 96 °F (35.6 °C) (Oral)   Resp 18   Ht 5' 5" (1.651 m)   Wt 61.9 kg (136 lb 7.4 oz)   LMP  (LMP Unknown)   SpO2 (!) 93%   BMI 22.71 kg/m²     US Carotid Bilateral  Narrative: EXAMINATION:  US CAROTID BILATERAL    CLINICAL HISTORY:  Heart failure, unspecifiedPeripheral vascular diasease;    COMPARISON:  None    FINDINGS:  Right common carotid:    Peak systolic velocity 67 cm/sec.    Right internal carotid artery: Heterogeneous plaque is seen in the bulb    Peak systolic velocity: 94 cm/sec.    IC/CC ratio:  1.4.    Left common carotid:    Peak systolic velocity 56 cm/sec.    Left internal carotid artery: Heterogeneous plaque is seen in the bulb    Peak systolic velocity 81 cm/sec.    IC/CC ratio 1.3.    Both vertebral arteries demonstrate antegrade flow.  Impression: No significant stenosis.    **Categories of stenosis (0-15%, " 16-49%, 50-69% and 70-99% ) are based on published criteria that have been internally validated.  Degree of stenosis is based on NASCET criteria and refers to the degree of luminal diameter narrowing as a percentage of the normal ICA distal to the stenosis and any area of post stenotic dilation.    Electronically signed by: Sebastien Trent MD  Date:    07/14/2022  Time:    10:33  Cardiac catheterization  · The pre-procedure left ventricular end diastolic pressure was 31.  · The post-procedure left ventricular end diastolic pressure was 32.  · The ejection fraction was calculated to be 40%.  · The Prox RCA to Mid RCA lesion was 100% stenosed.  · The Ost Cx to Prox Cx lesion was 99% stenosed.  · The Prox LAD to Mid LAD lesion was 75% stenosed.  · The estimated blood loss was none.  · There was three vessel coronary artery disease.     The procedure log was documented by Documenter: Daryl Lamb RN and   verified by Abhi Sloan MD.    Date: 7/14/2022  Time: 4:57 AM         Physical Exam  Vitals and nursing note reviewed.   Constitutional:       Appearance: She is ill-appearing.   HENT:      Head: Normocephalic.      Mouth/Throat:      Mouth: Mucous membranes are moist.   Eyes:      Extraocular Movements: Extraocular movements intact.      Pupils: Pupils are equal, round, and reactive to light.   Cardiovascular:      Rate and Rhythm: Normal rate and regular rhythm.   Pulmonary:      Effort: Pulmonary effort is normal.      Breath sounds: Normal breath sounds.   Abdominal:      General: Abdomen is flat.      Palpations: Abdomen is soft.   Musculoskeletal:         General: Normal range of motion.      Cervical back: Normal range of motion and neck supple.   Skin:     General: Skin is warm.      Capillary Refill: Capillary refill takes less than 2 seconds.   Neurological:      General: No focal deficit present.      Mental Status: She is alert and oriented to person, place, and time.   Psychiatric:         Mood and  Affect: Mood normal.         Assessment:     1. Coronary artery disease involving native heart, unspecified vessel or lesion type, unspecified whether angina present    2. Chest pain    3. NSTEMI (non-ST elevated myocardial infarction)    4. Diabetic ketoacidosis without coma associated with other specified diabetes mellitus    5. Hyperkalemia    6. ESRD (end stage renal disease)    7. Elevated troponin I level    8. Congestive heart failure, unspecified HF chronicity, unspecified heart failure type    9. Elevated troponin          Plan   Plan for CABG x2 on Monday  Patient has a calcified aorta and calcified coronary after reviewing her CT scan under films at this time a reasonable size will be to do off pump bypass with LIMA to LAD and a vein graft to the circumflex I discussed the risks and benefits and alternatives of surgery including myocardial infarction and stroke with the patient the patient agreed to proceed she will be considered as a high-risk candidate for both stroke as well as myocardial infarction.  2 units of pack cells to be transfused during her pre surgery dialysis since he is chronically anemic  Plan for the OR tomorrow          Sanju Locke MD  [facility]  [place]

## 2022-07-17 NOTE — ASSESSMENT & PLAN NOTE
Nephrology consulted for mwf maintenance   HD per nephrology   Off schedule for now due to impending surgery  Anticipate dialysis again on Tuesday after surgery

## 2022-07-17 NOTE — PROGRESS NOTES
NEPHROLOGY HEMODIALYSIS NOTE    Avril Monzon is a 77 y.o. female currently on hemodialysis for removal of uremic toxins and volume.    Blood pressure is stable    Ultrafiltration goal is 3 L.    Labs have been reviewed and the dialysate bath has been adjusted.    There are no symptoms of hypotension, chest pain, dyspnea, nausea or vomiting.    Labs:      Recent Labs   Lab 07/15/22  0543 07/15/22  0924 07/16/22  0519 07/16/22  0923 07/17/22  0211 07/17/22  0541 07/17/22  0916   *   < > 133*  133*   < > 130* 130* 133*   K 3.8   < > 4.1  4.1   < > 4.1 4.3 3.5   CL 98   < > 96  96   < > 92* 92* 99   CO2 23   < > 21*  21*   < > 22* 22* 22*   BUN 39*   < > 48*  48*   < > 60* 57* 26*   CREATININE 4.9*   < > 7.1*  7.1*   < > 9.0* 8.8* 4.2*   CALCIUM 8.3*   < > 8.5*  8.5*   < > 8.5* 8.5* 8.7   PHOS 4.6*  --  5.6*  5.6*  --   --  6.6*  --     < > = values in this interval not displayed.       Recent Labs   Lab 07/16/22  0519 07/17/22  0211 07/17/22  0541   WBC 5.49 5.50 5.15   HGB 8.9* 8.9* 8.7*   HCT 27.8* 26.3* 28.2*    234 243       Assessment/Plan:    HD today for removal of uremic toxins and volume.  PRBCs ordered this morning not ready for dialysis today   Do not recommend transfusing 2 units off dialysis.

## 2022-07-17 NOTE — PROGRESS NOTES
[facility] Medicine  Progress Note    Patient Name: Avril Monzon  MRN: [id number]  Patient Class: IP- Inpatient   Admission Date: 7/12/2022  Length of Stay: 5 days  Attending Physician: Facundo Dunlap MD  Primary Care Provider: Rose Parks MD        Subjective:     Principal Problem:NSTEMI (non-ST elevated myocardial infarction)        HPI:  75 y/o female with a pmh of ESRD on HD, DM2, hyperlipidemia,CAD , Tobacco abuse , HTN   and h/o CVA who was brought to ED by family today due to chest pain  for the last 4 days which has gotten worse .  It is located  in the midsternum , intermittent ,  Pressure like pain ,  no radiation , rated 6/10  associated sx son  and nausea . She was in her normal health of states before Friday . She denies  any  fever , chills , diarrhea , Cough , HA  or  sx . She report missed HD yesterday due to  not feeling well . She went to Urgent care  yesterday , was dx with sinus infection  and d/c home .  ER COURSE:  K 6.1 , Co2 12 , Anion gap 23 , BUN/Cr 78/7.1 ,  , Troponin 12 , BHB 1.8 . CXR show pulmonary congestion . ABG show a PH 7.297   Cardiology and Nephrology consulted from the  ER . Started on insulin and heparin drip   ER VS:   BP Pulse Resp Temp SpO2   106/76 72 16 98 °F (36.7 °C) 96 %   CODE Status : Full code  Pt will be admitted to inpt with a dx of NSTEMI and DKA      Overview/Hospital Course:  76 y/o AAF admitted to ICU with a dx of DKA and NSTEMI . Started on Insulin drio , heparin drip, plavix , asa , BB and statin.  [facility] consulted and performed a LHC which show a multivessel ds .  CVT consulted and plan for CABG Monday. As of 7/14/22 patient seen and examined in room, lying in bed. HD in progress. Denies any cardiac complaints today. Awaiting CABG on Monday.   7/15 awaiting cabg on Monday per CTS. Denies chest pain, dyspnea. On hd MWF  7/16 awaiting cabg on Monday per CTS. Indigestion  persists, characterized as tightness upper abdomen after eating, modestly improved with famotidine. Pmh gerd. Consider adding proton pump inhibitor. Denies chest pain, dyspnea.   7/17 tolerating dialysis this am. [place]. Indigestion improved. Denies chest pain, dyspnea, fever. Cabg tomorrow. Npo after mn      Interval History: See hospital course for today      Review of Systems   Constitutional:  Positive for fatigue. Negative for activity change, appetite change and fever.   Respiratory:  Negative for shortness of breath.    Cardiovascular:  Negative for chest pain and leg swelling.   Gastrointestinal:  Negative for abdominal pain, nausea and vomiting.   Genitourinary:         Still urinates   Neurological:  Positive for weakness.   Psychiatric/Behavioral:  Negative for agitation, confusion, decreased concentration, dysphoric mood and sleep disturbance. The patient is not nervous/anxious.    Objective:     Vital Signs (Most Recent):  Temp: 96 °F (35.6 °C) (07/17/22 0417)  Pulse: 69 (07/17/22 0417)  Resp: 18 (07/17/22 0417)  BP: 125/60 (07/17/22 0418)  SpO2: (!) 93 % (07/17/22 0417)   Vital Signs (24h Range):  Temp:  [96 °F (35.6 °C)-98.3 °F (36.8 °C)] 96 °F (35.6 °C)  Pulse:  [69-74] 69  Resp:  [18-20] 18  SpO2:  [93 %-99 %] 93 %  BP: (125-151)/(60-69) 125/60     Weight: 61.9 kg (136 lb 7.4 oz)  Body mass index is 22.71 kg/m².    Intake/Output Summary (Last 24 hours) at 7/17/2022 1118  Last data filed at 7/17/2022 1054  Gross per 24 hour   Intake 1200 ml   Output 3100 ml   Net -1900 ml      Physical Exam  Vitals and nursing note reviewed. Exam conducted with a chaperone present (dialysis nurse).   Constitutional:       General: She is not in acute distress.     Appearance: She is ill-appearing. She is not toxic-appearing.   HENT:      Head: Normocephalic and atraumatic.   Cardiovascular:      Rate and Rhythm: Normal rate.   Pulmonary:      Effort: Pulmonary effort is normal. No respiratory distress.   Abdominal:       Palpations: Abdomen is soft.      Tenderness: There is no abdominal tenderness.   Musculoskeletal:      Right lower leg: No edema.      Left lower leg: No edema.      Comments: Av fistula, upper left arm   Skin:     General: Skin is warm.      Capillary Refill: Capillary refill takes 2 to 3 seconds.   Neurological:      Mental Status: She is alert and oriented to person, place, and time.      Motor: Weakness present.   Psychiatric:         Mood and Affect: Mood normal.         Behavior: Behavior normal.       Significant Labs: All pertinent labs within the past 24 hours have been reviewed.  CBC:   Recent Labs   Lab 07/16/22  0519 07/17/22  0211 07/17/22  0541   WBC 5.49 5.50 5.15   HGB 8.9* 8.9* 8.7*   HCT 27.8* 26.3* 28.2*    234 243     CMP:   Recent Labs   Lab 07/16/22  0519 07/16/22  0923 07/17/22 0211 07/17/22  0541 07/17/22  0916   *  133*   < > 130* 130* 133*   K 4.1  4.1   < > 4.1 4.3 3.5   CL 96  96   < > 92* 92* 99   CO2 21*  21*   < > 22* 22* 22*   *  191*   < > 185* 164* 116*   BUN 48*  48*   < > 60* 57* 26*   CREATININE 7.1*  7.1*   < > 9.0* 8.8* 4.2*   CALCIUM 8.5*  8.5*   < > 8.5* 8.5* 8.7   ALBUMIN 2.8*  --   --   --   --    ANIONGAP 16  16   < > 16 16 12   EGFRNONAA 5*  5*   < > 4* 4* 10*    < > = values in this interval not displayed.         Significant Imaging: I have reviewed all pertinent imaging results/findings within the past 24 hours.      Assessment/Plan:      * NSTEMI (non-ST elevated myocardial infarction)  Cardiology consulted  Cont Heparin drip , asa , plavix and BB  TTE  Trend troponin level   S/P LHC which multiple vessel ds   CVT consulted     Plan for CABG on Monday   Transfer care post cabg    Metabolic acidosis  Cont HD        CAD (coronary artery disease)  Cont asa , bb and statin  S/P LHC which show multiple vessel ds   CVT consulted . Plan for CABG Monday           ESRD (end stage renal disease)  Nephrology consulted for mwf maintenance   HD  per nephrology   Off schedule for now due to impending surgery  Anticipate dialysis again on Tuesday after surgery    Hypertension associated with diabetes  Controlled   Cont beta blocker  Monitor after hd   Avoid hypoglycemia pending cabg        VTE Risk Mitigation (From admission, onward)         Ordered     IP VTE HIGH RISK PATIENT  Once         07/12/22 1637     Place DYANA hose  Until discontinued         07/12/22 1637     Place sequential compression device  Until discontinued         07/12/22 1637     Place sequential compression device  Until discontinued         07/12/22 1216                Discharge Planning   MARIBELL:      Code Status: Full Code   Is the patient medically ready for discharge?:     Reason for patient still in hospital (select all that apply): Patient trending condition, Laboratory test, Treatment, Consult recommendations and Pending disposition  Discharge Plan A: Home with family                  Facundo Dunlap MD  Department of Hospital Medicine   [facility]

## 2022-07-17 NOTE — PROGRESS NOTES
[facility]  Cardiology  Progress Note    Patient Name: Avril Monzon  MRN: [id number]  Admission Date: 7/12/2022  Hospital Length of Stay: 5 days  Code Status: Full Code   Attending Physician: Facundo Dunlap MD   Primary Care Physician: Rose Parks MD  Expected Discharge Date:   Principal Problem:NSTEMI (non-ST elevated myocardial infarction)    Subjective:     Hospital Course:   7/14/2022--Patient seen and examined in room, lying in bed. HD in progress. Denies any cardiac complaints today. Awaiting CABG on Monday.     7/15/2022--patient seen and examined in room, denies any complaints on exam today.     7/17/22 No c/o on HD on exam. Patient denies CP, angina or anginal equivalent. CABG tomorrow      Interval History:     Review of Systems   Constitutional: Negative. Negative for weight gain.   HENT: Negative.     Eyes: Negative.    Cardiovascular: Negative.  Negative for chest pain, leg swelling and palpitations.   Respiratory: Negative.  Negative for shortness of breath.    Endocrine: Negative.    Hematologic/Lymphatic: Negative.    Skin: Negative.    Musculoskeletal:  Negative for muscle weakness.   Gastrointestinal: Negative.    Genitourinary: Negative.    Neurological: Negative.  Negative for dizziness.   Psychiatric/Behavioral: Negative.     Allergic/Immunologic: Negative.    Objective:     Vital Signs (Most Recent):  Temp: 96 °F (35.6 °C) (07/17/22 0417)  Pulse: 69 (07/17/22 0417)  Resp: 18 (07/17/22 0417)  BP: 125/60 (07/17/22 0418)  SpO2: (Abnormal) 93 % (07/17/22 0417)   Vital Signs (24h Range):  Temp:  [96 °F (35.6 °C)-98.3 °F (36.8 °C)] 96 °F (35.6 °C)  Pulse:  [69-74] 69  Resp:  [18-20] 18  SpO2:  [93 %-99 %] 93 %  BP: (125-151)/(60-69) 125/60     Weight: 61.9 kg (136 lb 7.4 oz)  Body mass index is 22.71 kg/m².     SpO2: (Abnormal) 93 %  O2 Device (Oxygen Therapy): room air      Intake/Output Summary (Last 24 hours) at 7/17/2022 1010  Last data filed at 7/16/2022 1800  Gross per 24 hour    Intake 1200 ml   Output no documentation   Net 1200 ml       Lines/Drains/Airways       Drain       Name Duration         Hemodialysis AV Fistula 04/01/20 2203 Left upper arm 836 days              Peripheral Intravenous Line       Name Duration         Peripheral IV - Single Lumen 20 G Right Hand No Data         Peripheral IV - Single Lumen 07/12/22 1714 20 G Right Antecubital 4 days                    Physical Exam  Vitals and nursing note reviewed.   Constitutional:       Appearance: She is well-developed.   HENT:      Head: Normocephalic and atraumatic.   Eyes:      Conjunctiva/sclera: Conjunctivae normal.      Pupils: Pupils are equal, round, and reactive to light.   Cardiovascular:      Rate and Rhythm: Normal rate and regular rhythm.      Pulses: Intact distal pulses.      Heart sounds: Normal heart sounds.   Pulmonary:      Effort: Pulmonary effort is normal.      Breath sounds: Normal breath sounds.   Abdominal:      General: Bowel sounds are normal.      Palpations: Abdomen is soft.   Musculoskeletal:         General: Normal range of motion.      Cervical back: Normal range of motion and neck supple.   Skin:     General: Skin is warm and dry.   Neurological:      Mental Status: She is alert and oriented to person, place, and time.       Significant Labs: All pertinent lab results from the last 24 hours have been reviewed.    Significant Imaging: X-Ray: CXR: X-Ray Chest 1 View (CXR): No results found for this visit on 07/12/22.    Assessment and Plan:     Brief HPI:     * NSTEMI (non-ST elevated myocardial infarction)  Plan for CABG on Monday per CTS  Continue ASA BB  Chest pain free    Elevated troponin  77 y.o. female patient with a PMHx of CKD, CAD s/p cardiac stenting, DM2, heart failure, HLD, HTN, NSTEMI, seizure, tobacco dependence, and CVA presents with NSTEMI and CHF.  EKG NSR, ST depression multiple leads. Initial troponin is 12. Pt being dialyzed today.    Continue IV heparin, cardiac cath once  CHF stable, being dialyzed today    ESRD (end stage renal disease)  Mgmt per nephrology    Hypertension associated with diabetes  Continue BB        VTE Risk Mitigation (From admission, onward)         Ordered     IP VTE HIGH RISK PATIENT  Once         07/12/22 1637     Place DYANA hose  Until discontinued         07/12/22 1637     Place sequential compression device  Until discontinued         07/12/22 1637     Place sequential compression device  Until discontinued         07/12/22 1216                Abhi Sloan MD  Cardiology  [facility]

## 2022-07-17 NOTE — SUBJECTIVE & OBJECTIVE
Interval History:     Review of Systems   Constitutional: Negative. Negative for weight gain.   HENT: Negative.     Eyes: Negative.    Cardiovascular: Negative.  Negative for chest pain, leg swelling and palpitations.   Respiratory: Negative.  Negative for shortness of breath.    Endocrine: Negative.    Hematologic/Lymphatic: Negative.    Skin: Negative.    Musculoskeletal:  Negative for muscle weakness.   Gastrointestinal: Negative.    Genitourinary: Negative.    Neurological: Negative.  Negative for dizziness.   Psychiatric/Behavioral: Negative.     Allergic/Immunologic: Negative.    Objective:     Vital Signs (Most Recent):  Temp: 96 °F (35.6 °C) (07/17/22 0417)  Pulse: 69 (07/17/22 0417)  Resp: 18 (07/17/22 0417)  BP: 125/60 (07/17/22 0418)  SpO2: (Abnormal) 93 % (07/17/22 0417)   Vital Signs (24h Range):  Temp:  [96 °F (35.6 °C)-98.3 °F (36.8 °C)] 96 °F (35.6 °C)  Pulse:  [69-74] 69  Resp:  [18-20] 18  SpO2:  [93 %-99 %] 93 %  BP: (125-151)/(60-69) 125/60     Weight: 61.9 kg (136 lb 7.4 oz)  Body mass index is 22.71 kg/m².     SpO2: (Abnormal) 93 %  O2 Device (Oxygen Therapy): room air      Intake/Output Summary (Last 24 hours) at 7/17/2022 1010  Last data filed at 7/16/2022 1800  Gross per 24 hour   Intake 1200 ml   Output no documentation   Net 1200 ml       Lines/Drains/Airways       Drain       Name Duration         Hemodialysis AV Fistula 04/01/20 2203 Left upper arm 836 days              Peripheral Intravenous Line       Name Duration         Peripheral IV - Single Lumen 20 G Right Hand No Data         Peripheral IV - Single Lumen 07/12/22 1714 20 G Right Antecubital 4 days                    Physical Exam  Vitals and nursing note reviewed.   Constitutional:       Appearance: She is well-developed.   HENT:      Head: Normocephalic and atraumatic.   Eyes:      Conjunctiva/sclera: Conjunctivae normal.      Pupils: Pupils are equal, round, and reactive to light.   Cardiovascular:      Rate and Rhythm: Normal  rate and regular rhythm.      Pulses: Intact distal pulses.      Heart sounds: Normal heart sounds.   Pulmonary:      Effort: Pulmonary effort is normal.      Breath sounds: Normal breath sounds.   Abdominal:      General: Bowel sounds are normal.      Palpations: Abdomen is soft.   Musculoskeletal:         General: Normal range of motion.      Cervical back: Normal range of motion and neck supple.   Skin:     General: Skin is warm and dry.   Neurological:      Mental Status: She is alert and oriented to person, place, and time.       Significant Labs: All pertinent lab results from the last 24 hours have been reviewed.    Significant Imaging: X-Ray: CXR: X-Ray Chest 1 View (CXR): No results found for this visit on 07/12/22.

## 2022-07-17 NOTE — PLAN OF CARE
Duration: 3 hours    Stable/unstable: stable    Ultrafiltration volume: 2.6 liters net    Notes: No access issues, Tolerated, Dr. Salcedo visited during hd, blood not available to adm. With hd Dr. Salcedo notified.

## 2022-07-18 ENCOUNTER — ANESTHESIA EVENT (OUTPATIENT)
Dept: SURGERY | Facility: HOSPITAL | Age: 77
DRG: 233 | End: 2022-07-18
Payer: MEDICARE

## 2022-07-18 LAB
ALBUMIN SERPL BCP-MCNC: 2.9 G/DL (ref 3.5–5.2)
ALP SERPL-CCNC: 82 U/L (ref 55–135)
ALT SERPL W/O P-5'-P-CCNC: 24 U/L (ref 10–44)
ANION GAP SERPL CALC-SCNC: 13 MMOL/L (ref 8–16)
ANION GAP SERPL CALC-SCNC: 14 MMOL/L (ref 8–16)
ANION GAP SERPL CALC-SCNC: 14 MMOL/L (ref 8–16)
ANION GAP SERPL CALC-SCNC: 15 MMOL/L (ref 8–16)
ANION GAP SERPL CALC-SCNC: 16 MMOL/L (ref 8–16)
ANION GAP SERPL CALC-SCNC: 19 MMOL/L (ref 8–16)
AST SERPL-CCNC: 18 U/L (ref 10–40)
BASOPHILS # BLD AUTO: 0.02 K/UL (ref 0–0.2)
BASOPHILS # BLD AUTO: 0.02 K/UL (ref 0–0.2)
BASOPHILS NFR BLD: 0.3 % (ref 0–1.9)
BASOPHILS NFR BLD: 0.3 % (ref 0–1.9)
BILIRUB SERPL-MCNC: 0.6 MG/DL (ref 0.1–1)
BUN SERPL-MCNC: 38 MG/DL (ref 8–23)
BUN SERPL-MCNC: 40 MG/DL (ref 8–23)
BUN SERPL-MCNC: 44 MG/DL (ref 8–23)
BUN SERPL-MCNC: 49 MG/DL (ref 8–23)
BUN SERPL-MCNC: 50 MG/DL (ref 8–23)
BUN SERPL-MCNC: 52 MG/DL (ref 8–23)
CALCIUM SERPL-MCNC: 8.6 MG/DL (ref 8.7–10.5)
CALCIUM SERPL-MCNC: 8.6 MG/DL (ref 8.7–10.5)
CALCIUM SERPL-MCNC: 8.7 MG/DL (ref 8.7–10.5)
CALCIUM SERPL-MCNC: 8.9 MG/DL (ref 8.7–10.5)
CHLORIDE SERPL-SCNC: 95 MMOL/L (ref 95–110)
CHLORIDE SERPL-SCNC: 96 MMOL/L (ref 95–110)
CHLORIDE SERPL-SCNC: 97 MMOL/L (ref 95–110)
CO2 SERPL-SCNC: 17 MMOL/L (ref 23–29)
CO2 SERPL-SCNC: 18 MMOL/L (ref 23–29)
CO2 SERPL-SCNC: 18 MMOL/L (ref 23–29)
CO2 SERPL-SCNC: 19 MMOL/L (ref 23–29)
CO2 SERPL-SCNC: 21 MMOL/L (ref 23–29)
CO2 SERPL-SCNC: 21 MMOL/L (ref 23–29)
CREAT SERPL-MCNC: 6.5 MG/DL (ref 0.5–1.4)
CREAT SERPL-MCNC: 6.7 MG/DL (ref 0.5–1.4)
CREAT SERPL-MCNC: 7 MG/DL (ref 0.5–1.4)
CREAT SERPL-MCNC: 7 MG/DL (ref 0.5–1.4)
CREAT SERPL-MCNC: 7.5 MG/DL (ref 0.5–1.4)
CREAT SERPL-MCNC: 7.8 MG/DL (ref 0.5–1.4)
DIFFERENTIAL METHOD: ABNORMAL
DIFFERENTIAL METHOD: ABNORMAL
EOSINOPHIL # BLD AUTO: 0.1 K/UL (ref 0–0.5)
EOSINOPHIL # BLD AUTO: 0.1 K/UL (ref 0–0.5)
EOSINOPHIL NFR BLD: 1.5 % (ref 0–8)
EOSINOPHIL NFR BLD: 2.3 % (ref 0–8)
ERYTHROCYTE [DISTWIDTH] IN BLOOD BY AUTOMATED COUNT: 16.7 % (ref 11.5–14.5)
ERYTHROCYTE [DISTWIDTH] IN BLOOD BY AUTOMATED COUNT: 16.8 % (ref 11.5–14.5)
EST. GFR  (AFRICAN AMERICAN): 5 ML/MIN/1.73 M^2
EST. GFR  (AFRICAN AMERICAN): 6 ML/MIN/1.73 M^2
EST. GFR  (AFRICAN AMERICAN): 7 ML/MIN/1.73 M^2
EST. GFR  (NON AFRICAN AMERICAN): 5 ML/MIN/1.73 M^2
EST. GFR  (NON AFRICAN AMERICAN): 6 ML/MIN/1.73 M^2
EST. GFR  (NON AFRICAN AMERICAN): 6 ML/MIN/1.73 M^2
GLUCOSE SERPL-MCNC: 141 MG/DL (ref 70–110)
GLUCOSE SERPL-MCNC: 223 MG/DL (ref 70–110)
GLUCOSE SERPL-MCNC: 248 MG/DL (ref 70–110)
GLUCOSE SERPL-MCNC: 253 MG/DL (ref 70–110)
GLUCOSE SERPL-MCNC: 268 MG/DL (ref 70–110)
GLUCOSE SERPL-MCNC: 274 MG/DL (ref 70–110)
HCT VFR BLD AUTO: 32.4 % (ref 37–48.5)
HCT VFR BLD AUTO: 34.5 % (ref 37–48.5)
HGB BLD-MCNC: 11.1 G/DL (ref 12–16)
HGB BLD-MCNC: 11.4 G/DL (ref 12–16)
IMM GRANULOCYTES # BLD AUTO: 0.02 K/UL (ref 0–0.04)
IMM GRANULOCYTES # BLD AUTO: 0.03 K/UL (ref 0–0.04)
IMM GRANULOCYTES NFR BLD AUTO: 0.3 % (ref 0–0.5)
IMM GRANULOCYTES NFR BLD AUTO: 0.5 % (ref 0–0.5)
LYMPHOCYTES # BLD AUTO: 0.7 K/UL (ref 1–4.8)
LYMPHOCYTES # BLD AUTO: 0.9 K/UL (ref 1–4.8)
LYMPHOCYTES NFR BLD: 10.9 % (ref 18–48)
LYMPHOCYTES NFR BLD: 15.8 % (ref 18–48)
MCH RBC QN AUTO: 30.5 PG (ref 27–31)
MCH RBC QN AUTO: 30.8 PG (ref 27–31)
MCHC RBC AUTO-ENTMCNC: 33 G/DL (ref 32–36)
MCHC RBC AUTO-ENTMCNC: 34.3 G/DL (ref 32–36)
MCV RBC AUTO: 90 FL (ref 82–98)
MCV RBC AUTO: 92 FL (ref 82–98)
MONOCYTES # BLD AUTO: 0.4 K/UL (ref 0.3–1)
MONOCYTES # BLD AUTO: 0.5 K/UL (ref 0.3–1)
MONOCYTES NFR BLD: 7.4 % (ref 4–15)
MONOCYTES NFR BLD: 9.2 % (ref 4–15)
NEUTROPHILS # BLD AUTO: 4.2 K/UL (ref 1.8–7.7)
NEUTROPHILS # BLD AUTO: 4.7 K/UL (ref 1.8–7.7)
NEUTROPHILS NFR BLD: 72.1 % (ref 38–73)
NEUTROPHILS NFR BLD: 79.4 % (ref 38–73)
NRBC BLD-RTO: 0 /100 WBC
NRBC BLD-RTO: 0 /100 WBC
PHOSPHATE SERPL-MCNC: 5 MG/DL (ref 2.7–4.5)
PLATELET # BLD AUTO: 246 K/UL (ref 150–450)
PLATELET # BLD AUTO: 252 K/UL (ref 150–450)
PMV BLD AUTO: 9.3 FL (ref 9.2–12.9)
PMV BLD AUTO: 9.9 FL (ref 9.2–12.9)
POCT GLUCOSE: 130 MG/DL (ref 70–110)
POCT GLUCOSE: 227 MG/DL (ref 70–110)
POCT GLUCOSE: 265 MG/DL (ref 70–110)
POCT GLUCOSE: 272 MG/DL (ref 70–110)
POCT GLUCOSE: >500 MG/DL (ref 70–110)
POTASSIUM SERPL-SCNC: 4.5 MMOL/L (ref 3.5–5.1)
POTASSIUM SERPL-SCNC: 4.6 MMOL/L (ref 3.5–5.1)
PROT SERPL-MCNC: 5.9 G/DL (ref 6–8.4)
RBC # BLD AUTO: 3.6 M/UL (ref 4–5.4)
RBC # BLD AUTO: 3.74 M/UL (ref 4–5.4)
SODIUM SERPL-SCNC: 128 MMOL/L (ref 136–145)
SODIUM SERPL-SCNC: 129 MMOL/L (ref 136–145)
SODIUM SERPL-SCNC: 130 MMOL/L (ref 136–145)
SODIUM SERPL-SCNC: 130 MMOL/L (ref 136–145)
SODIUM SERPL-SCNC: 131 MMOL/L (ref 136–145)
SODIUM SERPL-SCNC: 133 MMOL/L (ref 136–145)
WBC # BLD AUTO: 5.77 K/UL (ref 3.9–12.7)
WBC # BLD AUTO: 5.97 K/UL (ref 3.9–12.7)

## 2022-07-18 PROCEDURE — 25000003 PHARM REV CODE 250: Performed by: INTERNAL MEDICINE

## 2022-07-18 PROCEDURE — 63600175 PHARM REV CODE 636 W HCPCS: Performed by: FAMILY MEDICINE

## 2022-07-18 PROCEDURE — 80048 BASIC METABOLIC PNL TOTAL CA: CPT | Mod: 91 | Performed by: INTERNAL MEDICINE

## 2022-07-18 PROCEDURE — 99233 PR SUBSEQUENT HOSPITAL CARE,LEVL III: ICD-10-PCS | Mod: ,,, | Performed by: INTERNAL MEDICINE

## 2022-07-18 PROCEDURE — 36415 COLL VENOUS BLD VENIPUNCTURE: CPT | Performed by: EMERGENCY MEDICINE

## 2022-07-18 PROCEDURE — 99233 SBSQ HOSP IP/OBS HIGH 50: CPT | Mod: ,,, | Performed by: INTERNAL MEDICINE

## 2022-07-18 PROCEDURE — 85025 COMPLETE CBC W/AUTO DIFF WBC: CPT | Performed by: EMERGENCY MEDICINE

## 2022-07-18 PROCEDURE — 99232 PR SUBSEQUENT HOSPITAL CARE,LEVL II: ICD-10-PCS | Mod: ,,, | Performed by: INTERNAL MEDICINE

## 2022-07-18 PROCEDURE — 99232 SBSQ HOSP IP/OBS MODERATE 35: CPT | Mod: ,,, | Performed by: INTERNAL MEDICINE

## 2022-07-18 PROCEDURE — 21400001 HC TELEMETRY ROOM

## 2022-07-18 PROCEDURE — 96372 THER/PROPH/DIAG INJ SC/IM: CPT

## 2022-07-18 PROCEDURE — 25000003 PHARM REV CODE 250: Performed by: THORACIC SURGERY (CARDIOTHORACIC VASCULAR SURGERY)

## 2022-07-18 PROCEDURE — 25000003 PHARM REV CODE 250: Performed by: FAMILY MEDICINE

## 2022-07-18 PROCEDURE — 84100 ASSAY OF PHOSPHORUS: CPT | Performed by: INTERNAL MEDICINE

## 2022-07-18 PROCEDURE — 36415 COLL VENOUS BLD VENIPUNCTURE: CPT | Performed by: INTERNAL MEDICINE

## 2022-07-18 RX ORDER — CEFAZOLIN SODIUM 2 G/50ML
2 SOLUTION INTRAVENOUS
Status: DISCONTINUED | OUTPATIENT
Start: 2022-07-18 | End: 2022-07-18 | Stop reason: HOSPADM

## 2022-07-18 RX ORDER — AMIODARONE HYDROCHLORIDE 200 MG/1
200 TABLET ORAL ONCE
Status: COMPLETED | OUTPATIENT
Start: 2022-07-18 | End: 2022-07-18

## 2022-07-18 RX ORDER — METOPROLOL TARTRATE 25 MG/1
25 TABLET, FILM COATED ORAL
Status: COMPLETED | OUTPATIENT
Start: 2022-07-18 | End: 2022-07-18

## 2022-07-18 RX ADMIN — METOPROLOL TARTRATE 25 MG: 25 TABLET, FILM COATED ORAL at 05:07

## 2022-07-18 RX ADMIN — HYDRALAZINE HYDROCHLORIDE 25 MG: 25 TABLET, FILM COATED ORAL at 09:07

## 2022-07-18 RX ADMIN — ASPIRIN 81 MG: 81 TABLET, COATED ORAL at 09:07

## 2022-07-18 RX ADMIN — DOCUSATE SODIUM AND SENNOSIDES 2 TABLET: 8.6; 5 TABLET, FILM COATED ORAL at 09:07

## 2022-07-18 RX ADMIN — INSULIN HUMAN 8 UNITS: 100 INJECTION, SOLUTION PARENTERAL at 02:07

## 2022-07-18 RX ADMIN — INSULIN ASPART 5 UNITS: 100 INJECTION, SOLUTION INTRAVENOUS; SUBCUTANEOUS at 02:07

## 2022-07-18 RX ADMIN — METOPROLOL TARTRATE 50 MG: 50 TABLET, FILM COATED ORAL at 10:07

## 2022-07-18 RX ADMIN — FAMOTIDINE 20 MG: 20 TABLET ORAL at 09:07

## 2022-07-18 RX ADMIN — INSULIN ASPART 3 UNITS: 100 INJECTION, SOLUTION INTRAVENOUS; SUBCUTANEOUS at 05:07

## 2022-07-18 RX ADMIN — MUPIROCIN: 20 OINTMENT TOPICAL at 09:07

## 2022-07-18 RX ADMIN — AMIODARONE HYDROCHLORIDE 200 MG: 200 TABLET ORAL at 05:07

## 2022-07-18 RX ADMIN — METOPROLOL TARTRATE 50 MG: 50 TABLET, FILM COATED ORAL at 09:07

## 2022-07-18 RX ADMIN — NEPHROCAP 1 CAPSULE: 1 CAP ORAL at 09:07

## 2022-07-18 RX ADMIN — INSULIN DETEMIR 6 UNITS: 100 INJECTION, SOLUTION SUBCUTANEOUS at 10:07

## 2022-07-18 RX ADMIN — HYDRALAZINE HYDROCHLORIDE 25 MG: 25 TABLET, FILM COATED ORAL at 10:07

## 2022-07-18 NOTE — PROGRESS NOTES
[facility]  Nephrology  Progress Note    Patient Name: Avril Monzon  MRN: [id number]  Admission Date: 7/12/2022  Hospital Length of Stay: 6 days  Attending Provider: Facundo Dunlap MD   Primary Care Physician: Rose Parks MD  Principal Problem:NSTEMI (non-ST elevated myocardial infarction)    Subjective:     HPI: Noted    Interval History: Pt was seen and examined. Labs and meds reviewed. Discussed with other providers. Chart was reviewed. Pt is a 78 y/o female with ESRD on chronic HD at [facility], who presented with NSTEMI (troponin > 50,000). Pt has no new c/o's today, no CP, no SOB. Last HD was yesterday.    Review of patient's allergies indicates:   Allergen Reactions    Codeine Swelling    Darvon [propoxyphene] Swelling    Atorvastatin      Muscle twitching     Current Facility-Administered Medications   Medication Frequency    0.9%  NaCl infusion (for blood administration) Q24H PRN    0.9%  NaCl infusion Continuous PRN    acetaminophen tablet 650 mg Q4H PRN    aspirin EC tablet 81 mg Daily    atropine injection 0.5 mg PRN    dextrose 10 % infusion PRN    dextrose 10 % infusion PRN    dextrose 10% bolus 125 mL PRN    dextrose 10% bolus 250 mL PRN    EPINEPHrine (ADRENALIN) 5 mg in dextrose 5 % 250 mL infusion Continuous    famotidine tablet 20 mg Daily    glucagon (human recombinant) injection 1 mg PRN    glucose chewable tablet 16 g PRN    glucose chewable tablet 24 g PRN    hydrALAZINE tablet 25 mg Q12H    insulin aspart U-100 pen 0-5 Units QID (AC + HS) PRN    insulin detemir U-100 pen 6 Units QHS    metoprolol tartrate (LOPRESSOR) tablet 50 mg BID    mupirocin 2 % ointment BID    nitroGLYCERIN SL tablet 0.4 mg Q5 Min PRN    promethazine (PHENERGAN) 12.5 mg in dextrose 5 % 50 mL IVPB Q6H PRN    senna-docusate 8.6-50 mg per tablet 2 tablet Daily    sodium chloride 0.9% flush 10 mL PRN    vitamin renal formula (B-complex-vitamin c-folic acid) 1 mg per capsule 1  capsule Daily       Objective:     Vital Signs (Most Recent):  Temp: 98.3 °F (36.8 °C) (07/18/22 0438)  Pulse: 71 (07/18/22 0900)  Resp: 18 (07/18/22 0438)  BP: (!) 150/63 (07/18/22 0438)  SpO2: 95 % (07/18/22 0438)  O2 Device (Oxygen Therapy): room air (07/17/22 2152)   Vital Signs (24h Range):  Temp:  [97.3 °F (36.3 °C)-98.3 °F (36.8 °C)] 98.3 °F (36.8 °C)  Pulse:  [64-78] 71  Resp:  [16-18] 18  SpO2:  [94 %-100 %] 95 %  BP: (134-166)/(63-78) 150/63     Weight: 61.9 kg (136 lb 7.4 oz) (07/16/22 0003)  Body mass index is 22.71 kg/m².  Body surface area is 1.68 meters squared.    I/O last 3 completed shifts:  In: 470.8 [Blood:470.8]  Out: 3100 [Other:3100]    Physical Exam  Vitals and nursing note reviewed.   Constitutional:       Appearance: Normal appearance.   HENT:      Head: Normocephalic and atraumatic.   Cardiovascular:      Rate and Rhythm: Normal rate and regular rhythm.   Pulmonary:      Effort: Pulmonary effort is normal.      Breath sounds: Normal breath sounds. No rales.   Abdominal:      General: Abdomen is flat.      Tenderness: There is no abdominal tenderness.   Musculoskeletal:      Right lower leg: No edema.      Left lower leg: No edema.   Neurological:      Mental Status: She is alert.       Significant Labs: reviewed  BMP  Lab Results   Component Value Date     (L) 07/18/2022    K 4.6 07/18/2022    CL 95 07/18/2022    CO2 19 (L) 07/18/2022    BUN 52 (H) 07/18/2022    CREATININE 7.5 (H) 07/18/2022    CALCIUM 8.7 07/18/2022    ANIONGAP 15 07/18/2022    ESTGFRAFRICA 6 (A) 07/18/2022    EGFRNONAA 5 (A) 07/18/2022     Lab Results   Component Value Date    WBC 5.77 07/18/2022    HGB 11.1 (L) 07/18/2022    HCT 32.4 (L) 07/18/2022    MCV 90 07/18/2022     07/18/2022       Recent Labs   Lab 07/13/22  0200   TROPONINI >50.000*       Significant Imaging: reviewed cardiac studies    Assessment/Plan:     76 y/o female with ESRD on chronic HD presented with acute coronary syndrome:     ESRD (end  stage renal disease)  ESRD pt on chronic HD q MWF at [facility]  S/p HD yesterday  K normal  O2 sat OK  No indications for HD today  Continue HD q MWF     HTN: BP controlled     Anemia: mild, normocytic  On epogen with HD      * Acute coronary events  Presented with SOB   NSTEMI  Troponin very high  Reviewed cardiology and CT surgery notes  CABG planned      Type 2 diabetes mellitus with hyperglycemia, with long-term current use of insulin  Reviewed the chart.        Plans and recommendations:  As discussed above  Total time spent 40 minutes including time needed to review the records, the   patient evaluation, documentation, face-to-face discussion with the patient,   more than 50% of the time was spent on coordination of care and counseling.            Angela Hanson MD  Nephrology  [facility]

## 2022-07-18 NOTE — ANESTHESIA PREPROCEDURE EVALUATION
07/18/2022  Avril Monzon is a 77 y.o., female.    Patient Active Problem List   Diagnosis    Constipation    Hypertension associated with diabetes    Hyperglycemia due to diabetes mellitus    ESRD (end stage renal disease)    Proteinuria    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, with long-term current use of insulin    Cataracta brunescens of right eye    Bilateral carotid artery disease    Aortic atherosclerosis    Type 2 diabetes mellitus with circulatory disorder, with long-term current use of insulin    History of CVA (cerebrovascular accident)    Hyperparathyroidism    Vitamin D deficiency    DDD (degenerative disc disease), lumbar    Anemia in chronic kidney disease, on chronic dialysis    Iron deficiency anemia due to chronic blood loss    Hyperlipidemia associated with type 2 diabetes mellitus    Pulmonary hypertension    Acute CHF    CAD (coronary artery disease)    Exudative age-related macular degeneration of left eye with active choroidal neovascularization    Other headache syndrome    Non-rheumatic mitral regurgitation    Non-rheumatic tricuspid valve insufficiency    Age-related nuclear cataract of left eye    DM type 2 with diabetic peripheral neuropathy    Pulmonary edema    Unspecified inflammatory spondylopathy, lumbar region    Convulsions    Hemiplegia    Other specified complication of vascular prosthetic devices, implants and grafts, initial encounter    Elevated troponin    Chronic diastolic heart failure    Patient left without being seen    NSTEMI (non-ST elevated myocardial infarction)    Metabolic acidosis     Past Surgical History:   Procedure Laterality Date    BREAST LUMPECTOMY Left     benign, when patient was 15 years old    BREAST MASS EXCISION      ,benign, age 15.    CATHETERIZATION OF BOTH LEFT  AND RIGHT HEART N/A 11/28/2018    Procedure: CATHETERIZATION, HEART, BOTH LEFT AND RIGHT;  Surgeon: Michlele Holman MD;  Location: [facility] CATH LAB;  Service: Cardiology;  Laterality: N/A;    CATHETERIZATION OF BOTH LEFT AND RIGHT HEART N/A 11/30/2018    Procedure: CATHETERIZATION, HEART, BOTH LEFT AND RIGHT;  Surgeon: Michelle Holman MD;  Location: [facility] CATH LAB;  Service: Cardiology;  Laterality: N/A;    HYSTERECTOMY  1982    INSERTION OF SHUNT      LEFT HEART CATHETERIZATION Left 12/3/2018    Procedure: CATHETERIZATION, HEART, LEFT;  Surgeon: Michelle Holman MD;  Location: [facility] CATH LAB;  Service: Cardiology;  Laterality: Left;  LAD ATHERECTOMY STENT/ FEMORAL APPROACH    LEFT HEART CATHETERIZATION Left 7/13/2022    Procedure: CATHETERIZATION, HEART, LEFT;  Surgeon: Abhi Sloan MD;  Location: [facility] CATH LAB;  Service: Cardiology;  Laterality: Left;    OOPHORECTOMY      wrist cyst Right 1980s    dorsal wrist cyst       Pre-op Assessment    I have reviewed the Patient Summary Reports.    I have reviewed the NPO Status.   I have reviewed the Medications.     Review of Systems  Anesthesia Hx:  No problems with previous Anesthesia    Social:  Former Smoker    Hematology/Oncology:         -- Anemia:   EENT/Dental:   Diabetic retinopathy   Cardiovascular:   Hypertension Past MI CAD   CHF ECG has been reviewed. NSTEMI  Bilateral carotid artery disease  Aortic atherosclerosis  Pulmonary hypertension  Non-rheumatic mitral regurgitation  Non-rheumatic tricuspid valve insufficiency     status post PCI in 2018    Pulmonary:  Pulmonary Normal Hx hypoxemic respiratory failure   Renal/:   Chronic Renal Disease, CRI, ESRD, Dialysis Dialyzed yesterday   Hepatic/GI:   GERD    Musculoskeletal:   Arthritis  DDD (degenerative disc disease), lumbar   Neurological:   CVA Neuromuscular Disease, Headaches Seizures Hemiplegia  Peripheral Neuropathy    Endocrine:   Diabetes Hx DKA (diabetic ketoacidosis)       Physical  Exam  General: Well nourished    Airway:  Mallampati: II   Mouth Opening: Normal  TM Distance: Normal  Neck ROM: Normal ROM    Dental:  Intact        Anesthesia Plan  Type of Anesthesia, risks & benefits discussed:    Anesthesia Type: Gen ETT  Intra-op Monitoring Plan: Standard ASA Monitors, Art Line, Central Line, EUNICE, CO and PA  Post Op Pain Control Plan: multimodal analgesia  Induction:  IV  Airway Plan: , Post-Induction  Informed Consent: Informed consent signed with the Patient and all parties understand the risks and agree with anesthesia plan.  All questions answered.   ASA Score: 4    Ready For Surgery From Anesthesia Perspective.     .        Normal sinus rhythm   LVH with repolarization abnormality ( Morgan product )   Prolonged QT   When compared with ECG of 12-JUL-2022 10:27,   ST less depressed in Inferior leads   T wave inversion now evident in Anterior leads   Confirmed by OSWALDO BARRY MD ([id number]) on 7/14/2022 5:08:12 PM     Nuclear stress 1/4/22:    Abnormal myocardial perfusion scan.    There is a severe intensity, moderate to large sized fixed defect consistent with scar in the anteroapical wall(s).    The gated perfusion images showed an ejection fraction of 64% at rest. The gated perfusion images showed an ejection fraction of 65% post stress.    The EKG portion of this study is negative for ischemia.    During stress, rare PVCs are noted.     Echo 7/12/22:  · Concentric hypertrophy and normal systolic function.  · The estimated ejection fraction is 55%.  · Normal left ventricular diastolic function.  · Normal right ventricular size with normal right ventricular systolic function.  · Moderate tricuspid regurgitation.  · There is pulmonary hypertension.         Heart Cath: 7/13/22:    · The pre-procedure left ventricular end diastolic pressure was 31.  · The post-procedure left ventricular end diastolic pressure was 32.  · The ejection fraction was calculated to be 40%.  · The Prox RCA  to Mid RCA lesion was 100% stenosed.  · The Ost Cx to Prox Cx lesion was 99% stenosed.  · The Prox LAD to Mid LAD lesion was 75% stenosed.  · The estimated blood loss was none.  · There was three vessel coronary artery disease.       Chemistry        Component Value Date/Time     (L) 07/20/2022 0556    K 4.4 07/20/2022 0556    CL 99 07/20/2022 0556    CO2 25 07/20/2022 0556    BUN 30 (H) 07/20/2022 0556    CREATININE 5.8 (H) 07/20/2022 0556     (H) 07/20/2022 0556        Component Value Date/Time    CALCIUM 8.7 07/20/2022 0556    ALKPHOS 92 07/19/2022 2127    AST 15 07/19/2022 2127    ALT 17 07/19/2022 2127    BILITOT 0.6 07/19/2022 2127    ESTGFRAFRICA 8 (A) 07/20/2022 0556    EGFRNONAA 7 (A) 07/20/2022 0556        Lab Results   Component Value Date    WBC 4.97 07/20/2022    HGB 11.1 (L) 07/20/2022    HCT 33.5 (L) 07/20/2022    MCV 91 07/20/2022     07/20/2022

## 2022-07-18 NOTE — ASSESSMENT & PLAN NOTE
Cardiology consulted  Cont Heparin drip , asa , plavix and BB  TTE  Trend troponin level   S/P LHC which multiple vessel ds   CVT consulted     Plan for CABG   Transfer care post cabg

## 2022-07-18 NOTE — SUBJECTIVE & OBJECTIVE
Interval History: Pt was seen and examined. Labs and meds reviewed. Discussed with other providers. Chart was reviewed. Pt is a 78 y/o female with ESRD on chronic HD at [facility], who presented with NSTEMI (troponin > 50,000). Pt has no new c/o's today, no CP, no SOB. Last HD was yesterday.    Review of patient's allergies indicates:   Allergen Reactions    Codeine Swelling    Darvon [propoxyphene] Swelling    Atorvastatin      Muscle twitching     Current Facility-Administered Medications   Medication Frequency    0.9%  NaCl infusion (for blood administration) Q24H PRN    0.9%  NaCl infusion Continuous PRN    acetaminophen tablet 650 mg Q4H PRN    aspirin EC tablet 81 mg Daily    atropine injection 0.5 mg PRN    dextrose 10 % infusion PRN    dextrose 10 % infusion PRN    dextrose 10% bolus 125 mL PRN    dextrose 10% bolus 250 mL PRN    EPINEPHrine (ADRENALIN) 5 mg in dextrose 5 % 250 mL infusion Continuous    famotidine tablet 20 mg Daily    glucagon (human recombinant) injection 1 mg PRN    glucose chewable tablet 16 g PRN    glucose chewable tablet 24 g PRN    hydrALAZINE tablet 25 mg Q12H    insulin aspart U-100 pen 0-5 Units QID (AC + HS) PRN    insulin detemir U-100 pen 6 Units QHS    metoprolol tartrate (LOPRESSOR) tablet 50 mg BID    mupirocin 2 % ointment BID    nitroGLYCERIN SL tablet 0.4 mg Q5 Min PRN    promethazine (PHENERGAN) 12.5 mg in dextrose 5 % 50 mL IVPB Q6H PRN    senna-docusate 8.6-50 mg per tablet 2 tablet Daily    sodium chloride 0.9% flush 10 mL PRN    vitamin renal formula (B-complex-vitamin c-folic acid) 1 mg per capsule 1 capsule Daily       Objective:     Vital Signs (Most Recent):  Temp: 98.3 °F (36.8 °C) (07/18/22 0438)  Pulse: 71 (07/18/22 0900)  Resp: 18 (07/18/22 0438)  BP: (!) 150/63 (07/18/22 0438)  SpO2: 95 % (07/18/22 0438)  O2 Device (Oxygen Therapy): room air (07/17/22 2152)   Vital Signs (24h Range):  Temp:  [97.3 °F (36.3 °C)-98.3 °F (36.8 °C)] 98.3 °F (36.8  °C)  Pulse:  [64-78] 71  Resp:  [16-18] 18  SpO2:  [94 %-100 %] 95 %  BP: (134-166)/(63-78) 150/63     Weight: 61.9 kg (136 lb 7.4 oz) (07/16/22 0003)  Body mass index is 22.71 kg/m².  Body surface area is 1.68 meters squared.    I/O last 3 completed shifts:  In: 470.8 [Blood:470.8]  Out: 3100 [Other:3100]    Physical Exam  Vitals and nursing note reviewed.   Constitutional:       Appearance: Normal appearance.   HENT:      Head: Normocephalic and atraumatic.   Cardiovascular:      Rate and Rhythm: Normal rate and regular rhythm.   Pulmonary:      Effort: Pulmonary effort is normal.      Breath sounds: Normal breath sounds. No rales.   Abdominal:      General: Abdomen is flat.      Tenderness: There is no abdominal tenderness.   Musculoskeletal:      Right lower leg: No edema.      Left lower leg: No edema.   Neurological:      Mental Status: She is alert.       Significant Labs: reviewed  BMP  Lab Results   Component Value Date     (L) 07/18/2022    K 4.6 07/18/2022    CL 95 07/18/2022    CO2 19 (L) 07/18/2022    BUN 52 (H) 07/18/2022    CREATININE 7.5 (H) 07/18/2022    CALCIUM 8.7 07/18/2022    ANIONGAP 15 07/18/2022    ESTGFRAFRICA 6 (A) 07/18/2022    EGFRNONAA 5 (A) 07/18/2022     Lab Results   Component Value Date    WBC 5.77 07/18/2022    HGB 11.1 (L) 07/18/2022    HCT 32.4 (L) 07/18/2022    MCV 90 07/18/2022     07/18/2022       Recent Labs   Lab 07/13/22  0200   TROPONINI >50.000*       Significant Imaging: reviewed cardiac studies

## 2022-07-18 NOTE — ASSESSMENT & PLAN NOTE
Controlled to mildly elevated blood pressure  Uncontrolled diabetes mellitus   Cont beta blocker  Monitor after hd   Avoid hypoglycemia pending cabg

## 2022-07-18 NOTE — PROGRESS NOTES
Subjective:      Patient ID: Avril Monzon is a 77 y.o. female.    Chief Complaint: Chest Pain (Seen at urgent care yesterday; c/o sharp pain with shortness of breath; hx of MI three years ago)    HPI: Chief Complaint/Reason for Admission:  NSTEMI     History of Present Illness:  77-year-old female with a history of end-stage renal disease diabetes mellitus hypertension.  History of coronary artery disease status post PCI in 2018 was having symptoms of chest pain for 4 days when she came to the emergency room had troponin elevation she had a left heart catheterization which showed significant disease involving her coronaries in the fall of totally occluded right with filling of her posterior descending artery from left-to-right collaterals and disease involving her proximal left anterior descending artery and circumflex artery.  The patient is on intermittent hemodialysis and she missed 1 of her dialysis last week when the symptoms started.  Patient on intermittent dialysis.    Review of patient's allergies indicates:   Allergen Reactions    Codeine Swelling    Darvon [propoxyphene] Swelling    Atorvastatin      Muscle twitching       Past Medical History:   Diagnosis Date    Acute hypoxemic respiratory failure 11/26/2018    Anemia     Arthritis     back, hand, knees    Back pain     Cataract     CKD stage G4/A3, GFR 15 - 29 and albumin creatinine ratio >300 mg/g     GFR 40% Jan 2014 and 33% ub 3/2014 ([facility])    Coronary artery disease involving native coronary artery of native heart 11/30/2018    Diabetic retinopathy     DM (diabetes mellitus) type II controlled, neurological manifestation     Exudative age-related macular degeneration of left eye with active choroidal neovascularization 2/5/2019    GERD (gastroesophageal reflux disease)     Glaucoma     Heart failure     Hyperlipidemia     Hypertension     Non-ST elevation MI (NSTEMI) 11/28/2018    NSTEMI (non-ST elevated myocardial infarction)  2018    Peripheral neuropathy     Polyneuropathy     Proteinuria     >6 mo     Seizures     [facility] 2014 -dick CT NRI showed small vessel    Stroke     Tobacco dependence     Type 2 diabetes with peripheral circulatory disorder, controlled        Family History   Problem Relation Age of Onset    Diabetes Mother     Hyperlipidemia Mother     Hypertension Mother     Heart disease Mother         MI    Muscular dystrophy Son     Cancer Maternal Grandmother         colon       Social History     Socioeconomic History    Marital status:    Tobacco Use    Smoking status: Former Smoker     Packs/day: 1.50     Years: 30.00     Pack years: 45.00     Types: Cigarettes     Quit date: 1990     Years since quittin.5    Smokeless tobacco: Former User   Substance and Sexual Activity    Alcohol use: No     Alcohol/week: 0.0 standard drinks    Drug use: No    Sexual activity: Not Currently       Past Surgical History:   Procedure Laterality Date    BREAST LUMPECTOMY Left     benign, when patient was 15 years old    BREAST MASS EXCISION      ,benign, age 15.    CATHETERIZATION OF BOTH LEFT AND RIGHT HEART N/A 2018    Procedure: CATHETERIZATION, HEART, BOTH LEFT AND RIGHT;  Surgeon: Michelle Holman MD;  Location: [facility] CATH LAB;  Service: Cardiology;  Laterality: N/A;    CATHETERIZATION OF BOTH LEFT AND RIGHT HEART N/A 2018    Procedure: CATHETERIZATION, HEART, BOTH LEFT AND RIGHT;  Surgeon: Michelle Holman MD;  Location: [facility] CATH LAB;  Service: Cardiology;  Laterality: N/A;    HYSTERECTOMY  1982    INSERTION OF SHUNT      LEFT HEART CATHETERIZATION Left 12/3/2018    Procedure: CATHETERIZATION, HEART, LEFT;  Surgeon: Michelle Holman MD;  Location: [facility] CATH LAB;  Service: Cardiology;  Laterality: Left;  LAD ATHERECTOMY STENT/ FEMORAL APPROACH    LEFT HEART CATHETERIZATION Left 2022    Procedure: CATHETERIZATION, HEART, LEFT;  Surgeon: Abhi Sloan MD;   "Location: [facility] CATH LAB;  Service: Cardiology;  Laterality: Left;    OOPHORECTOMY      wrist cyst Right 1980s    dorsal wrist cyst       Review of Systems   Constitutional: Positive for fatigue. Negative for activity change and appetite change.   HENT: Negative for dental problem, nosebleeds and sore throat.    Eyes: Negative for discharge and visual disturbance.   Respiratory: Negative for cough, chest tightness and stridor.    Cardiovascular: Positive for leg swelling.   Gastrointestinal: Negative for abdominal distention and abdominal pain.   Genitourinary: Negative for difficulty urinating and dysuria.   Musculoskeletal: Negative for arthralgias, back pain and joint swelling.   Allergic/Immunologic: Negative for environmental allergies.   Neurological: Negative for dizziness, syncope and headaches.   Hematological: Does not bruise/bleed easily.   Psychiatric/Behavioral: Negative for behavioral problems.          Objective:   BP (!) 150/63 (BP Location: Right arm, Patient Position: Lying)   Pulse 66   Temp 98.3 °F (36.8 °C) (Oral)   Resp 18   Ht 5' 5" (1.651 m)   Wt 61.9 kg (136 lb 7.4 oz)   LMP  (LMP Unknown)   SpO2 95%   Breastfeeding No   BMI 22.71 kg/m²     X-Ray Chest AP Portable  Narrative: EXAMINATION:  XR CHEST AP PORTABLE    CLINICAL HISTORY:  preoperative for cardiothoracic surgery; Atherosclerotic heart disease of native coronary artery without angina pectoris    TECHNIQUE:  Single frontal view of the chest was performed.    COMPARISON:  Multiple priors.    FINDINGS:  The lungs are clear, with normal appearance of pulmonary vasculature and no pleural effusion or pneumothorax.    The cardiac silhouette is enlarged.  Aortic atherosclerosis.  The hilar and mediastinal contours are unremarkable.    Bones are intact.  Impression: No acute abnormality.    Electronically signed by: Yoel Ferrer  Date:    07/17/2022  Time:    21:49         Physical Exam  Vitals and nursing note reviewed. "   Constitutional:       Appearance: She is ill-appearing.   HENT:      Head: Normocephalic.      Mouth/Throat:      Mouth: Mucous membranes are moist.   Eyes:      Extraocular Movements: Extraocular movements intact.      Pupils: Pupils are equal, round, and reactive to light.   Cardiovascular:      Rate and Rhythm: Normal rate and regular rhythm.   Pulmonary:      Effort: Pulmonary effort is normal.      Breath sounds: Normal breath sounds.   Abdominal:      General: Abdomen is flat.      Palpations: Abdomen is soft.   Musculoskeletal:         General: Normal range of motion.      Cervical back: Normal range of motion and neck supple.   Skin:     General: Skin is warm.      Capillary Refill: Capillary refill takes less than 2 seconds.   Neurological:      General: No focal deficit present.      Mental Status: She is alert and oriented to person, place, and time.   Psychiatric:         Mood and Affect: Mood normal.         Assessment:     1. Coronary artery disease involving native heart, unspecified vessel or lesion type, unspecified whether angina present    2. Chest pain    3. NSTEMI (non-ST elevated myocardial infarction)    4. Diabetic ketoacidosis without coma associated with other specified diabetes mellitus    5. Hyperkalemia    6. ESRD (end stage renal disease)    7. Elevated troponin I level    8. Congestive heart failure, unspecified HF chronicity, unspecified heart failure type    9. Elevated troponin          Plan   Plan for CABG x2 on Monday  Patient has a calcified aorta and calcified coronary after reviewing her CT scan under films at this time a reasonable size will be to do off pump bypass with LIMA to LAD and a vein graft to the circumflex I discussed the risks and benefits and alternatives of surgery including myocardial infarction and stroke with the patient the patient agreed to proceed she will be considered as a high-risk candidate for both stroke as well as myocardial infarction.  Patient  surgery could not be performed since we are running short of Off pump equipment. We will try to get the equipment ASAP if not the other options will be to go for multivessel PCI.      Sanju Locke MD  [facility]  [place]

## 2022-07-18 NOTE — ASSESSMENT & PLAN NOTE
Cont asa , bb and statin  S/P LHC which show multiple vessel ds   CVT consulted . Plan for CABG pending

## 2022-07-18 NOTE — ASSESSMENT & PLAN NOTE
Plan for CABG on Monday per CTS  Continue ASA BB  Chest pain free    7/18/2022 chest pain free awaiting cabg

## 2022-07-18 NOTE — PROGRESS NOTES
[facility]  Cardiology  Progress Note    Patient Name: Avril Monzon  MRN: [id number]  Admission Date: 7/12/2022  Hospital Length of Stay: 6 days  Code Status: Full Code   Attending Physician: Facundo Dunlap MD   Primary Care Physician: Rose Parks MD  Expected Discharge Date:   Principal Problem:NSTEMI (non-ST elevated myocardial infarction)    Subjective:     Hospital Course:   7/14/2022--Patient seen and examined in room, lying in bed. HD in progress. Denies any cardiac complaints today. Awaiting CABG on Monday.     7/15/2022--patient seen and examined in room, denies any complaints on exam today.     7/17/22 No c/o on HD on exam. Patient denies CP, angina or anginal equivalent. CABG tomorrow. Start statin after cabg, continue b blockers    7/18/2022 stable no chest pain . Cabg on hold due equipment need for off pump bypass      Interval History: no chest pain    Review of Systems   Constitutional: Negative for diaphoresis, malaise/fatigue and weight gain.   HENT:  Negative for hoarse voice.    Eyes:  Negative for double vision and visual disturbance.   Cardiovascular:  Negative for chest pain, claudication, cyanosis, dyspnea on exertion, irregular heartbeat, leg swelling, near-syncope, orthopnea, palpitations, paroxysmal nocturnal dyspnea and syncope.   Respiratory:  Negative for cough, hemoptysis, shortness of breath and snoring.    Hematologic/Lymphatic: Negative for bleeding problem. Does not bruise/bleed easily.   Skin:  Negative for color change and poor wound healing.   Musculoskeletal:  Negative for muscle cramps, muscle weakness and myalgias.   Gastrointestinal:  Negative for bloating, abdominal pain, change in bowel habit, diarrhea, heartburn, hematemesis, hematochezia, melena and nausea.   Neurological:  Negative for excessive daytime sleepiness, dizziness, headaches, light-headedness, loss of balance, numbness and weakness.   Psychiatric/Behavioral:  Negative for memory loss. The patient  does not have insomnia.    Allergic/Immunologic: Negative for hives.   Objective:     Vital Signs (Most Recent):  Temp: 98.3 °F (36.8 °C) (07/18/22 0438)  Pulse: 71 (07/18/22 0900)  Resp: 18 (07/18/22 0438)  BP: (!) 150/63 (07/18/22 0438)  SpO2: 95 % (07/18/22 0438)   Vital Signs (24h Range):  Temp:  [97.3 °F (36.3 °C)-98.3 °F (36.8 °C)] 98.3 °F (36.8 °C)  Pulse:  [64-78] 71  Resp:  [16-18] 18  SpO2:  [94 %-100 %] 95 %  BP: (134-166)/(63-78) 150/63     Weight: 61.9 kg (136 lb 7.4 oz)  Body mass index is 22.71 kg/m².     SpO2: 95 %  O2 Device (Oxygen Therapy): room air      Intake/Output Summary (Last 24 hours) at 7/18/2022 1453  Last data filed at 7/17/2022 1614  Gross per 24 hour   Intake 166.67 ml   Output --   Net 166.67 ml       Lines/Drains/Airways       Drain  Duration                  Hemodialysis AV Fistula 04/01/20 2203 Left upper arm 837 days              Peripheral Intravenous Line  Duration                  Peripheral IV - Single Lumen 07/18/22 0625 20 G Posterior;Right Wrist <1 day                    Physical Exam  Vitals and nursing note reviewed.   Constitutional:       General: She is not in acute distress.     Appearance: Normal appearance. She is well-developed. She is not ill-appearing.   HENT:      Head: Normocephalic and atraumatic.   Eyes:      General: No scleral icterus.     Pupils: Pupils are equal, round, and reactive to light.   Neck:      Thyroid: No thyromegaly.      Vascular: Normal carotid pulses. No carotid bruit, hepatojugular reflux or JVD.      Trachea: No tracheal deviation.   Cardiovascular:      Rate and Rhythm: Normal rate and regular rhythm.      Pulses: Normal pulses.      Heart sounds: Normal heart sounds. No murmur heard.    No friction rub. No gallop.      Comments: Patent shunt  Pulmonary:      Effort: Pulmonary effort is normal. No respiratory distress.      Breath sounds: Normal breath sounds. No wheezing, rhonchi or rales.   Chest:      Chest wall: No tenderness.    Abdominal:      General: Bowel sounds are normal. There is no abdominal bruit.      Palpations: Abdomen is soft. There is no hepatomegaly or pulsatile mass.      Tenderness: There is no abdominal tenderness.   Musculoskeletal:      Right shoulder: No deformity.      Cervical back: Normal range of motion and neck supple.   Skin:     General: Skin is warm and dry.      Findings: No erythema or rash.      Nails: There is no clubbing.   Neurological:      Mental Status: She is alert and oriented to person, place, and time.      Cranial Nerves: No cranial nerve deficit.      Coordination: Coordination normal.   Psychiatric:         Speech: Speech normal.         Behavior: Behavior normal.         Judgment: Judgment normal.       Significant Labs:   Recent Lab Results  (Last 5 results in the past 24 hours)        07/18/22  1355   07/18/22  1322   07/18/22  0902   07/18/22  0623   07/18/22  0511        Anion Gap   15   14           BUN   52   50           Calcium   8.7   8.7           Chloride   95   97           CO2   19   17           Creatinine   7.5   7.0           eGFR if    6   6           eGFR if non    5  Comment: Calculation used to obtain the estimated glomerular filtration  rate (eGFR) is the CKD-EPI equation.      5  Comment: Calculation used to obtain the estimated glomerular filtration  rate (eGFR) is the CKD-EPI equation.              Glucose   268   223           POCT Glucose >500       227   265       Potassium   4.6   4.5           Sodium   129   128                                  Significant Imaging:    Assessment and Plan:     Brief HPI: a 78 yo female with cad s/p lad stent presnts with nstemi has 3 vessel cad low ef awaiting cabg currently pain free.    * NSTEMI (non-ST elevated myocardial infarction)  Plan for CABG on Monday per CTS  Continue ASA BB  Chest pain free    7/18/2022 chest pain free awaiting cabg    Elevated troponin  77 y.o. female patient with a PMHx  of CKD, CAD s/p cardiac stenting, DM2, heart failure, HLD, HTN, NSTEMI, seizure, tobacco dependence, and CVA presents with NSTEMI and CHF.  EKG NSR, ST depression multiple leads. Initial troponin is 12. Pt being dialyzed today.    Continue IV heparin, cardiac cath once CHF stable, being dialyzed today    CAD (coronary artery disease)  Has sevre cad 3 vessel low ef for cabg    ESRD (end stage renal disease)  Mgmt per nephrology    Hypertension associated with diabetes  Continue BB        VTE Risk Mitigation (From admission, onward)         Ordered     Place sequential compression device  Until discontinued         07/17/22 2118     Place DYANA hose  Until discontinued         07/12/22 1637     Place sequential compression device  Until discontinued         07/12/22 1637     Place sequential compression device  Until discontinued         07/12/22 1216                Roya Holman MD  Cardiology  [facility]

## 2022-07-18 NOTE — PROGRESS NOTES
[facility] Medicine  Progress Note    Patient Name: Avril Monzon  MRN: [id number]  Patient Class: IP- Inpatient   Admission Date: 7/12/2022  Length of Stay: 6 days  Attending Physician: Facundo Dunlap MD  Primary Care Provider: Rose Parks MD        Subjective:     Principal Problem:NSTEMI (non-ST elevated myocardial infarction)        HPI:  77 y/o female with a pmh of ESRD on HD, DM2, hyperlipidemia,CAD , Tobacco abuse , HTN   and h/o CVA who was brought to ED by family today due to chest pain  for the last 4 days which has gotten worse .  It is located  in the midsternum , intermittent ,  Pressure like pain ,  no radiation , rated 6/10  associated sx son  and nausea . She was in her normal health of states before Friday . She denies  any  fever , chills , diarrhea , Cough , HA  or  sx . She report missed HD yesterday due to  not feeling well . She went to Urgent care  yesterday , was dx with sinus infection  and d/c home .  ER COURSE:  K 6.1 , Co2 12 , Anion gap 23 , BUN/Cr 78/7.1 ,  , Troponin 12 , BHB 1.8 . CXR show pulmonary congestion . ABG show a PH 7.297   Cardiology and Nephrology consulted from the  ER . Started on insulin and heparin drip   ER VS:   BP Pulse Resp Temp SpO2   106/76 72 16 98 °F (36.7 °C) 96 %   CODE Status : Full code  Pt will be admitted to inpt with a dx of NSTEMI and DKA      Overview/Hospital Course:  78 y/o AAF admitted to ICU with a dx of DKA and NSTEMI . Started on Insulin drio , heparin drip, plavix , asa , BB and statin.  [facility] Cardiology consulted and performed a LHC which show a multivessel ds .  CVT consulted and plan for CABG Monday. As of 7/14/22 patient seen and examined in room, lying in bed. HD in progress. Denies any cardiac complaints today. Awaiting CABG on Monday.   7/15 awaiting cabg on Monday per CTS. Denies chest pain, dyspnea. On hd MWF  7/16 awaiting cabg on Monday per CTS. Indigestion  persists, characterized as tightness upper abdomen after eating, modestly improved with famotidine. Pmh gerd. Consider adding proton pump inhibitor. Denies chest pain, dyspnea.   7/17 tolerating dialysis this am. [place]ae[place]. Indigestion improved. Denies chest pain, dyspnea, fever. Cabg tomorrow. Npo after mn  7/18 cabg canceled today due to equipment shortage. Otherwise no complaints.      Interval History: See hospital course for today      Review of Systems   Constitutional:  Negative for activity change, appetite change and fatigue.   Respiratory:  Negative for shortness of breath.    Cardiovascular:  Negative for chest pain and leg swelling.   Gastrointestinal:  Positive for abdominal pain (resolved). Negative for constipation (last bm saturday), nausea and vomiting.   Genitourinary:  Positive for decreased urine volume.   Musculoskeletal:  Negative for gait problem.   Neurological:  Negative for weakness.   Psychiatric/Behavioral:  Negative for confusion, decreased concentration and dysphoric mood. The patient is not nervous/anxious.    Objective:     Vital Signs (Most Recent):  Temp: 98.3 °F (36.8 °C) (07/18/22 0438)  Pulse: 66 (07/18/22 0515)  Resp: 18 (07/18/22 0438)  BP: (!) 150/63 (07/18/22 0438)  SpO2: 95 % (07/18/22 0438)   Vital Signs (24h Range):  Temp:  [97 °F (36.1 °C)-98.3 °F (36.8 °C)] 98.3 °F (36.8 °C)  Pulse:  [64-78] 66  Resp:  [16-18] 18  SpO2:  [94 %-100 %] 95 %  BP: (134-166)/(63-78) 150/63     Weight: 61.9 kg (136 lb 7.4 oz)  Body mass index is 22.71 kg/m².    Intake/Output Summary (Last 24 hours) at 7/18/2022 1012  Last data filed at 7/17/2022 1614  Gross per 24 hour   Intake 470.84 ml   Output 3100 ml   Net -2629.16 ml      Physical Exam  Vitals and nursing note reviewed. Exam conducted with a chaperone present (visitors).   Constitutional:       General: She is not in acute distress.     Appearance: She is ill-appearing. She is not toxic-appearing.   HENT:      Head: Normocephalic and  atraumatic.   Cardiovascular:      Rate and Rhythm: Normal rate.   Pulmonary:      Effort: Pulmonary effort is normal. No respiratory distress.   Abdominal:      Palpations: Abdomen is soft.      Tenderness: There is no abdominal tenderness.   Musculoskeletal:      Right lower leg: Edema present.      Left lower leg: Edema present.      Comments: LUE fistula with palpable thrill   Skin:     General: Skin is warm.   Neurological:      Mental Status: She is alert and oriented to person, place, and time.   Psychiatric:         Mood and Affect: Mood normal.         Behavior: Behavior normal. Behavior is cooperative.       Significant Labs: All pertinent labs within the past 24 hours have been reviewed.  CBC:   Recent Labs   Lab 07/17/22  0541 07/17/22  2321 07/18/22  0338   WBC 5.15 5.97 5.77   HGB 8.7* 11.4* 11.1*   HCT 28.2* 34.5* 32.4*    252 246     CMP:   Recent Labs   Lab 07/17/22  2321 07/18/22  0338 07/18/22  0459 07/18/22  0902   * 131* 130* 128*   K 4.6 4.5 4.5 4.5   CL 96 96 96 97   CO2 21* 21* 18* 17*   * 253* 248* 223*   BUN 38* 40* 44* 50*   CREATININE 6.5* 7.0* 6.7* 7.0*   CALCIUM 8.6* 8.6* 8.7 8.7   PROT 5.9*  --   --   --    ALBUMIN 2.9*  --   --   --    BILITOT 0.6  --   --   --    ALKPHOS 82  --   --   --    AST 18  --   --   --    ALT 24  --   --   --    ANIONGAP 13 14 16 14   EGFRNONAA 6* 5* 6* 5*         Significant Imaging: I have reviewed all pertinent imaging results/findings within the past 24 hours.      Assessment/Plan:      * NSTEMI (non-ST elevated myocardial infarction)  Cardiology consulted  Cont Heparin drip , asa , plavix and BB  TTE  Trend troponin level   S/P LHC which multiple vessel ds   CVT consulted     Plan for CABG   Transfer care post cabg    Metabolic acidosis  Cont HD        CAD (coronary artery disease)  Cont asa , bb and statin  S/P LHC which show multiple vessel ds   CVT consulted . Plan for CABG pending           ESRD (end stage renal  disease)  Nephrology consulted for mwf maintenance   HD per nephrology   Off schedule for now due to impending surgery  Received 1 unit of blood post dialysis  Per nephrology, recommend transfusion during dialysis if possible  Hb/hct stable       Hypertension associated with diabetes  Controlled to mildly elevated blood pressure  Uncontrolled diabetes mellitus   Cont beta blocker  Monitor after hd   Avoid hypoglycemia pending cabg        VTE Risk Mitigation (From admission, onward)         Ordered     Place sequential compression device  Until discontinued         07/17/22 2118     Place DYANA hose  Until discontinued         07/12/22 1637     Place sequential compression device  Until discontinued         07/12/22 1637     Place sequential compression device  Until discontinued         07/12/22 1216                Discharge Planning   MARIBELL:      Code Status: Full Code   Is the patient medically ready for discharge?:     Reason for patient still in hospital (select all that apply): Patient trending condition, Laboratory test, Treatment, Consult recommendations and Pending disposition  Discharge Plan A: Home with family                  Facundo Dunlap MD  Department of Hospital Medicine   [facility]

## 2022-07-18 NOTE — PLAN OF CARE
Problem: Adult Inpatient Plan of Care  Goal: Absence of Hospital-Acquired Illness or Injury  Outcome: Ongoing, Progressing     Problem: Adult Inpatient Plan of Care  Goal: Optimal Comfort and Wellbeing  Outcome: Ongoing, Progressing     Problem: Adult Inpatient Plan of Care  Goal: Readiness for Transition of Care  Outcome: Ongoing, Progressing     Problem: Device-Related Complication Risk (Hemodialysis)  Goal: Safe, Effective Therapy Delivery  Outcome: Ongoing, Progressing     Problem: Hemodynamic Instability (Hemodialysis)  Goal: Effective Tissue Perfusion  Outcome: Ongoing, Progressing     Problem: Infection (Hemodialysis)  Goal: Absence of Infection Signs and Symptoms  Outcome: Ongoing, Progressing     Problem: Diabetic Ketoacidosis  Goal: Fluid and Electrolyte Balance with Absence of Ketosis  Outcome: Ongoing, Progressing     Problem: Skin Injury Risk Increased  Goal: Skin Health and Integrity  Outcome: Ongoing, Progressing

## 2022-07-18 NOTE — ASSESSMENT & PLAN NOTE
77 y/o female with ESRD on chronic HD presented with dyspnea:     ESRD (end stage renal disease)  ESRD pt on chronic HD q MWF at [facility]  S/p HD yesterday, initially has some cramps which resolved with Hd Rx adjustment, see RN's note  K normal  O2 sat OK  No indications for HD today  Pulmonary edema resolved with HD/UF     HTN: BP improved with fluid removal/HD  High BP was due to volume congestion     Anemia: normocytic  Hgb low partly dilutional due to volume overload  iron sat good.  On epogen with HD     Providing HD today  Discussed with dialysis RN     * Acute pulmonary edema  Presented with slowly progressive SOB and cough over the weekend  Due to XS fluid intake. Pt was able to identify the source  Afebrile, WBC normal. Pneumonia/infectiosn not expected  Influenza ruled out  COVID ruled out  Troponin non-specific  EKG unremarkable     Improved with HD and UF  Pt was advised to avoid XS fluid intake, keep all fluids <1 L/day  Avoid salt and sweet to reduce thirst     Type 2 diabetes mellitus with hyperglycemia, with long-term current use of insulin  Reviewed the chart.        Plans and recommendations:  As discussed above  OK to d/c home from our view  Total time spent 35 minutes including time needed to review the records, the   patient evaluation, documentation, face-to-face discussion with the patient,   more than 50% of the time was spent on coordination of care and counseling.

## 2022-07-18 NOTE — SUBJECTIVE & OBJECTIVE
Interval History: no chest pain    Review of Systems   Constitutional: Negative for diaphoresis, malaise/fatigue and weight gain.   HENT:  Negative for hoarse voice.    Eyes:  Negative for double vision and visual disturbance.   Cardiovascular:  Negative for chest pain, claudication, cyanosis, dyspnea on exertion, irregular heartbeat, leg swelling, near-syncope, orthopnea, palpitations, paroxysmal nocturnal dyspnea and syncope.   Respiratory:  Negative for cough, hemoptysis, shortness of breath and snoring.    Hematologic/Lymphatic: Negative for bleeding problem. Does not bruise/bleed easily.   Skin:  Negative for color change and poor wound healing.   Musculoskeletal:  Negative for muscle cramps, muscle weakness and myalgias.   Gastrointestinal:  Negative for bloating, abdominal pain, change in bowel habit, diarrhea, heartburn, hematemesis, hematochezia, melena and nausea.   Neurological:  Negative for excessive daytime sleepiness, dizziness, headaches, light-headedness, loss of balance, numbness and weakness.   Psychiatric/Behavioral:  Negative for memory loss. The patient does not have insomnia.    Allergic/Immunologic: Negative for hives.   Objective:     Vital Signs (Most Recent):  Temp: 98.3 °F (36.8 °C) (07/18/22 0438)  Pulse: 71 (07/18/22 0900)  Resp: 18 (07/18/22 0438)  BP: (!) 150/63 (07/18/22 0438)  SpO2: 95 % (07/18/22 0438)   Vital Signs (24h Range):  Temp:  [97.3 °F (36.3 °C)-98.3 °F (36.8 °C)] 98.3 °F (36.8 °C)  Pulse:  [64-78] 71  Resp:  [16-18] 18  SpO2:  [94 %-100 %] 95 %  BP: (134-166)/(63-78) 150/63     Weight: 61.9 kg (136 lb 7.4 oz)  Body mass index is 22.71 kg/m².     SpO2: 95 %  O2 Device (Oxygen Therapy): room air      Intake/Output Summary (Last 24 hours) at 7/18/2022 1453  Last data filed at 7/17/2022 1614  Gross per 24 hour   Intake 166.67 ml   Output --   Net 166.67 ml       Lines/Drains/Airways       Drain  Duration                  Hemodialysis AV Fistula 04/01/20 2201 Left upper arm  837 days              Peripheral Intravenous Line  Duration                  Peripheral IV - Single Lumen 07/18/22 0625 20 G Posterior;Right Wrist <1 day                    Physical Exam  Vitals and nursing note reviewed.   Constitutional:       General: She is not in acute distress.     Appearance: Normal appearance. She is well-developed. She is not ill-appearing.   HENT:      Head: Normocephalic and atraumatic.   Eyes:      General: No scleral icterus.     Pupils: Pupils are equal, round, and reactive to light.   Neck:      Thyroid: No thyromegaly.      Vascular: Normal carotid pulses. No carotid bruit, hepatojugular reflux or JVD.      Trachea: No tracheal deviation.   Cardiovascular:      Rate and Rhythm: Normal rate and regular rhythm.      Pulses: Normal pulses.      Heart sounds: Normal heart sounds. No murmur heard.    No friction rub. No gallop.      Comments: Patent shunt  Pulmonary:      Effort: Pulmonary effort is normal. No respiratory distress.      Breath sounds: Normal breath sounds. No wheezing, rhonchi or rales.   Chest:      Chest wall: No tenderness.   Abdominal:      General: Bowel sounds are normal. There is no abdominal bruit.      Palpations: Abdomen is soft. There is no hepatomegaly or pulsatile mass.      Tenderness: There is no abdominal tenderness.   Musculoskeletal:      Right shoulder: No deformity.      Cervical back: Normal range of motion and neck supple.   Skin:     General: Skin is warm and dry.      Findings: No erythema or rash.      Nails: There is no clubbing.   Neurological:      Mental Status: She is alert and oriented to person, place, and time.      Cranial Nerves: No cranial nerve deficit.      Coordination: Coordination normal.   Psychiatric:         Speech: Speech normal.         Behavior: Behavior normal.         Judgment: Judgment normal.       Significant Labs:   Recent Lab Results  (Last 5 results in the past 24 hours)        07/18/22  1355   07/18/22  1322    07/18/22  0902   07/18/22  0623   07/18/22  0511        Anion Gap   15   14           BUN   52   50           Calcium   8.7   8.7           Chloride   95   97           CO2   19   17           Creatinine   7.5   7.0           eGFR if    6   6           eGFR if non    5  Comment: Calculation used to obtain the estimated glomerular filtration  rate (eGFR) is the CKD-EPI equation.      5  Comment: Calculation used to obtain the estimated glomerular filtration  rate (eGFR) is the CKD-EPI equation.              Glucose   268   223           POCT Glucose >500       227   265       Potassium   4.6   4.5           Sodium   129   128                                  Significant Imaging:

## 2022-07-18 NOTE — SUBJECTIVE & OBJECTIVE
Interval History: See hospital course for today      Review of Systems   Constitutional:  Negative for activity change, appetite change and fatigue.   Respiratory:  Negative for shortness of breath.    Cardiovascular:  Negative for chest pain and leg swelling.   Gastrointestinal:  Positive for abdominal pain (resolved). Negative for constipation (last bm saturday), nausea and vomiting.   Genitourinary:  Positive for decreased urine volume.   Musculoskeletal:  Negative for gait problem.   Neurological:  Negative for weakness.   Psychiatric/Behavioral:  Negative for confusion, decreased concentration and dysphoric mood. The patient is not nervous/anxious.    Objective:     Vital Signs (Most Recent):  Temp: 98.3 °F (36.8 °C) (07/18/22 0438)  Pulse: 66 (07/18/22 0515)  Resp: 18 (07/18/22 0438)  BP: (!) 150/63 (07/18/22 0438)  SpO2: 95 % (07/18/22 0438)   Vital Signs (24h Range):  Temp:  [97 °F (36.1 °C)-98.3 °F (36.8 °C)] 98.3 °F (36.8 °C)  Pulse:  [64-78] 66  Resp:  [16-18] 18  SpO2:  [94 %-100 %] 95 %  BP: (134-166)/(63-78) 150/63     Weight: 61.9 kg (136 lb 7.4 oz)  Body mass index is 22.71 kg/m².    Intake/Output Summary (Last 24 hours) at 7/18/2022 1012  Last data filed at 7/17/2022 1614  Gross per 24 hour   Intake 470.84 ml   Output 3100 ml   Net -2629.16 ml      Physical Exam  Vitals and nursing note reviewed. Exam conducted with a chaperone present (visitors).   Constitutional:       General: She is not in acute distress.     Appearance: She is ill-appearing. She is not toxic-appearing.   HENT:      Head: Normocephalic and atraumatic.   Cardiovascular:      Rate and Rhythm: Normal rate.   Pulmonary:      Effort: Pulmonary effort is normal. No respiratory distress.   Abdominal:      Palpations: Abdomen is soft.      Tenderness: There is no abdominal tenderness.   Musculoskeletal:      Right lower leg: Edema present.      Left lower leg: Edema present.      Comments: LUE fistula with palpable thrill   Skin:      General: Skin is warm.   Neurological:      Mental Status: She is alert and oriented to person, place, and time.   Psychiatric:         Mood and Affect: Mood normal.         Behavior: Behavior normal. Behavior is cooperative.       Significant Labs: All pertinent labs within the past 24 hours have been reviewed.  CBC:   Recent Labs   Lab 07/17/22  0541 07/17/22  2321 07/18/22  0338   WBC 5.15 5.97 5.77   HGB 8.7* 11.4* 11.1*   HCT 28.2* 34.5* 32.4*    252 246     CMP:   Recent Labs   Lab 07/17/22  2321 07/18/22  0338 07/18/22  0459 07/18/22  0902   * 131* 130* 128*   K 4.6 4.5 4.5 4.5   CL 96 96 96 97   CO2 21* 21* 18* 17*   * 253* 248* 223*   BUN 38* 40* 44* 50*   CREATININE 6.5* 7.0* 6.7* 7.0*   CALCIUM 8.6* 8.6* 8.7 8.7   PROT 5.9*  --   --   --    ALBUMIN 2.9*  --   --   --    BILITOT 0.6  --   --   --    ALKPHOS 82  --   --   --    AST 18  --   --   --    ALT 24  --   --   --    ANIONGAP 13 14 16 14   EGFRNONAA 6* 5* 6* 5*         Significant Imaging: I have reviewed all pertinent imaging results/findings within the past 24 hours.

## 2022-07-19 LAB
ALBUMIN SERPL BCP-MCNC: 3.2 G/DL (ref 3.5–5.2)
ALP SERPL-CCNC: 92 U/L (ref 55–135)
ALT SERPL W/O P-5'-P-CCNC: 17 U/L (ref 10–44)
ANION GAP SERPL CALC-SCNC: 15 MMOL/L (ref 8–16)
ANION GAP SERPL CALC-SCNC: 15 MMOL/L (ref 8–16)
AST SERPL-CCNC: 15 U/L (ref 10–40)
BASOPHILS # BLD AUTO: 0.01 K/UL (ref 0–0.2)
BASOPHILS NFR BLD: 0.2 % (ref 0–1.9)
BILIRUB SERPL-MCNC: 0.6 MG/DL (ref 0.1–1)
BUN SERPL-MCNC: 24 MG/DL (ref 8–23)
BUN SERPL-MCNC: 61 MG/DL (ref 8–23)
CALCIUM SERPL-MCNC: 8.7 MG/DL (ref 8.7–10.5)
CALCIUM SERPL-MCNC: 9 MG/DL (ref 8.7–10.5)
CHLORIDE SERPL-SCNC: 96 MMOL/L (ref 95–110)
CHLORIDE SERPL-SCNC: 99 MMOL/L (ref 95–110)
CO2 SERPL-SCNC: 17 MMOL/L (ref 23–29)
CO2 SERPL-SCNC: 23 MMOL/L (ref 23–29)
CREAT SERPL-MCNC: 5.1 MG/DL (ref 0.5–1.4)
CREAT SERPL-MCNC: 8.4 MG/DL (ref 0.5–1.4)
DIFFERENTIAL METHOD: ABNORMAL
EOSINOPHIL # BLD AUTO: 0.1 K/UL (ref 0–0.5)
EOSINOPHIL NFR BLD: 1.8 % (ref 0–8)
ERYTHROCYTE [DISTWIDTH] IN BLOOD BY AUTOMATED COUNT: 16.4 % (ref 11.5–14.5)
EST. GFR  (AFRICAN AMERICAN): 5 ML/MIN/1.73 M^2
EST. GFR  (AFRICAN AMERICAN): 9 ML/MIN/1.73 M^2
EST. GFR  (NON AFRICAN AMERICAN): 4 ML/MIN/1.73 M^2
EST. GFR  (NON AFRICAN AMERICAN): 8 ML/MIN/1.73 M^2
GLUCOSE SERPL-MCNC: 217 MG/DL (ref 70–110)
GLUCOSE SERPL-MCNC: 225 MG/DL (ref 70–110)
HCT VFR BLD AUTO: 33.8 % (ref 37–48.5)
HGB BLD-MCNC: 11 G/DL (ref 12–16)
IMM GRANULOCYTES # BLD AUTO: 0.02 K/UL (ref 0–0.04)
IMM GRANULOCYTES NFR BLD AUTO: 0.4 % (ref 0–0.5)
LYMPHOCYTES # BLD AUTO: 0.8 K/UL (ref 1–4.8)
LYMPHOCYTES NFR BLD: 15.1 % (ref 18–48)
MCH RBC QN AUTO: 30.5 PG (ref 27–31)
MCHC RBC AUTO-ENTMCNC: 32.5 G/DL (ref 32–36)
MCV RBC AUTO: 94 FL (ref 82–98)
MONOCYTES # BLD AUTO: 0.6 K/UL (ref 0.3–1)
MONOCYTES NFR BLD: 10.5 % (ref 4–15)
NEUTROPHILS # BLD AUTO: 3.9 K/UL (ref 1.8–7.7)
NEUTROPHILS NFR BLD: 72 % (ref 38–73)
NRBC BLD-RTO: 0 /100 WBC
PHOSPHATE SERPL-MCNC: 5.7 MG/DL (ref 2.7–4.5)
PLATELET # BLD AUTO: 280 K/UL (ref 150–450)
PMV BLD AUTO: 9.5 FL (ref 9.2–12.9)
POCT GLUCOSE: 135 MG/DL (ref 70–110)
POCT GLUCOSE: 227 MG/DL (ref 70–110)
POCT GLUCOSE: 258 MG/DL (ref 70–110)
POTASSIUM SERPL-SCNC: 3.9 MMOL/L (ref 3.5–5.1)
POTASSIUM SERPL-SCNC: 4.8 MMOL/L (ref 3.5–5.1)
PROT SERPL-MCNC: 6.3 G/DL (ref 6–8.4)
RBC # BLD AUTO: 3.61 M/UL (ref 4–5.4)
SODIUM SERPL-SCNC: 128 MMOL/L (ref 136–145)
SODIUM SERPL-SCNC: 137 MMOL/L (ref 136–145)
WBC # BLD AUTO: 5.44 K/UL (ref 3.9–12.7)

## 2022-07-19 PROCEDURE — 25000003 PHARM REV CODE 250: Performed by: INTERNAL MEDICINE

## 2022-07-19 PROCEDURE — 99233 PR SUBSEQUENT HOSPITAL CARE,LEVL III: ICD-10-PCS | Mod: ,,, | Performed by: INTERNAL MEDICINE

## 2022-07-19 PROCEDURE — 84100 ASSAY OF PHOSPHORUS: CPT | Performed by: INTERNAL MEDICINE

## 2022-07-19 PROCEDURE — 85025 COMPLETE CBC W/AUTO DIFF WBC: CPT | Performed by: EMERGENCY MEDICINE

## 2022-07-19 PROCEDURE — 36415 COLL VENOUS BLD VENIPUNCTURE: CPT | Performed by: INTERNAL MEDICINE

## 2022-07-19 PROCEDURE — 99233 SBSQ HOSP IP/OBS HIGH 50: CPT | Mod: ,,, | Performed by: INTERNAL MEDICINE

## 2022-07-19 PROCEDURE — 36415 COLL VENOUS BLD VENIPUNCTURE: CPT | Performed by: THORACIC SURGERY (CARDIOTHORACIC VASCULAR SURGERY)

## 2022-07-19 PROCEDURE — 80100016 HC MAINTENANCE HEMODIALYSIS

## 2022-07-19 PROCEDURE — 80048 BASIC METABOLIC PNL TOTAL CA: CPT | Performed by: NURSE PRACTITIONER

## 2022-07-19 PROCEDURE — 25000003 PHARM REV CODE 250: Performed by: FAMILY MEDICINE

## 2022-07-19 PROCEDURE — 36415 COLL VENOUS BLD VENIPUNCTURE: CPT | Performed by: EMERGENCY MEDICINE

## 2022-07-19 PROCEDURE — 21400001 HC TELEMETRY ROOM

## 2022-07-19 PROCEDURE — 63600175 PHARM REV CODE 636 W HCPCS: Performed by: NURSE PRACTITIONER

## 2022-07-19 PROCEDURE — 80053 COMPREHEN METABOLIC PANEL: CPT | Performed by: THORACIC SURGERY (CARDIOTHORACIC VASCULAR SURGERY)

## 2022-07-19 RX ORDER — METOPROLOL TARTRATE 25 MG/1
25 TABLET, FILM COATED ORAL
Status: COMPLETED | OUTPATIENT
Start: 2022-07-20 | End: 2022-07-20

## 2022-07-19 RX ORDER — CEFAZOLIN SODIUM 2 G/50ML
2 SOLUTION INTRAVENOUS
Status: DISCONTINUED | OUTPATIENT
Start: 2022-07-20 | End: 2022-07-20 | Stop reason: HOSPADM

## 2022-07-19 RX ORDER — AMIODARONE HYDROCHLORIDE 200 MG/1
200 TABLET ORAL
Status: DISCONTINUED | OUTPATIENT
Start: 2022-07-20 | End: 2022-07-20 | Stop reason: HOSPADM

## 2022-07-19 RX ORDER — CHLORHEXIDINE GLUCONATE ORAL RINSE 1.2 MG/ML
10 SOLUTION DENTAL
Status: DISCONTINUED | OUTPATIENT
Start: 2022-07-20 | End: 2022-07-20 | Stop reason: HOSPADM

## 2022-07-19 RX ADMIN — HYDRALAZINE HYDROCHLORIDE 25 MG: 25 TABLET, FILM COATED ORAL at 09:07

## 2022-07-19 RX ADMIN — METOPROLOL TARTRATE 50 MG: 50 TABLET, FILM COATED ORAL at 09:07

## 2022-07-19 RX ADMIN — METOPROLOL TARTRATE 50 MG: 50 TABLET, FILM COATED ORAL at 08:07

## 2022-07-19 RX ADMIN — INSULIN DETEMIR 6 UNITS: 100 INJECTION, SOLUTION SUBCUTANEOUS at 08:07

## 2022-07-19 RX ADMIN — INSULIN ASPART 1 UNITS: 100 INJECTION, SOLUTION INTRAVENOUS; SUBCUTANEOUS at 08:07

## 2022-07-19 RX ADMIN — FAMOTIDINE 20 MG: 20 TABLET ORAL at 09:07

## 2022-07-19 RX ADMIN — DOCUSATE SODIUM AND SENNOSIDES 2 TABLET: 8.6; 5 TABLET, FILM COATED ORAL at 09:07

## 2022-07-19 RX ADMIN — ASPIRIN 81 MG: 81 TABLET, COATED ORAL at 09:07

## 2022-07-19 RX ADMIN — NEPHROCAP 1 CAPSULE: 1 CAP ORAL at 09:07

## 2022-07-19 RX ADMIN — INSULIN ASPART 2 UNITS: 100 INJECTION, SOLUTION INTRAVENOUS; SUBCUTANEOUS at 06:07

## 2022-07-19 NOTE — PROGRESS NOTES
[facility] Medicine  Progress Note    Patient Name: Avril Monzon  MRN: [id number]  Patient Class: IP- Inpatient   Admission Date: 7/12/2022  Length of Stay: 7 days  Attending Physician: Facundo Dunlap MD  Primary Care Provider: Rose Parks MD        Subjective:     Principal Problem:NSTEMI (non-ST elevated myocardial infarction)        HPI:  77 y/o female with a pmh of ESRD on HD, DM2, hyperlipidemia,CAD , Tobacco abuse , HTN   and h/o CVA who was brought to ED by family today due to chest pain  for the last 4 days which has gotten worse .  It is located  in the midsternum , intermittent ,  Pressure like pain ,  no radiation , rated 6/10  associated sx son  and nausea . She was in her normal health of states before Friday . She denies  any  fever , chills , diarrhea , Cough , HA  or  sx . She report missed HD yesterday due to  not feeling well . She went to Urgent care  yesterday , was dx with sinus infection  and d/c home .  ER COURSE:  K 6.1 , Co2 12 , Anion gap 23 , BUN/Cr 78/7.1 ,  , Troponin 12 , BHB 1.8 . CXR show pulmonary congestion . ABG show a PH 7.297   Cardiology and Nephrology consulted from the  ER . Started on insulin and heparin drip   ER VS:   BP Pulse Resp Temp SpO2   106/76 72 16 98 °F (36.7 °C) 96 %   CODE Status : Full code  Pt will be admitted to inpt with a dx of NSTEMI and DKA      Overview/Hospital Course:  78 y/o AAF admitted to ICU with a dx of DKA and NSTEMI . Started on Insulin drio , heparin drip, plavix , asa , BB and statin.  [facility] Cardiology consulted and performed a LHC which show a multivessel ds .  CVT consulted and plan for CABG Monday. As of 7/14/22 patient seen and examined in room, lying in bed. HD in progress. Denies any cardiac complaints today. Awaiting CABG on Monday.   7/15 awaiting cabg on Monday per CTS. Denies chest pain, dyspnea. On hd MWF  7/16 awaiting cabg on Monday per CTS. Indigestion  persists, characterized as tightness upper abdomen after eating, modestly improved with famotidine. Pmh gerd. Consider adding proton pump inhibitor. Denies chest pain, dyspnea.   7/17 tolerating dialysis this am. Naeon. Indigestion improved. Denies chest pain, dyspnea, fever. Cabg tomorrow. Npo after mn  7/18 cabg canceled today due to equipment shortage. Otherwise no complaints.  7/19 NAEON. Awaiting cabg tomorrow      Interval History: See hospital course for today      Review of Systems   Constitutional:  Negative for activity change, appetite change, fatigue and fever.   Respiratory:  Negative for shortness of breath.    Cardiovascular:  Negative for chest pain and leg swelling.   Gastrointestinal:  Positive for constipation. Negative for abdominal pain, nausea and vomiting.        Indigestion improving   Musculoskeletal:  Negative for gait problem.   Neurological:  Negative for weakness.   Psychiatric/Behavioral:  Negative for confusion, decreased concentration and dysphoric mood. The patient is not nervous/anxious.    Objective:     Vital Signs (Most Recent):  Temp: 97.9 °F (36.6 °C) (07/19/22 0812)  Pulse: 66 (07/19/22 0812)  Resp: 18 (07/19/22 0812)  BP: (!) 113/56 (07/19/22 0812)  SpO2: 100 % (07/19/22 0812)   Vital Signs (24h Range):  Temp:  [97 °F (36.1 °C)-98.2 °F (36.8 °C)] 97.9 °F (36.6 °C)  Pulse:  [64-70] 66  Resp:  [16-18] 18  SpO2:  [97 %-100 %] 100 %  BP: (113-141)/(56-64) 113/56     Weight: 61.9 kg (136 lb 7.4 oz)  Body mass index is 22.71 kg/m².    Intake/Output Summary (Last 24 hours) at 7/19/2022 0938  Last data filed at 7/18/2022 1700  Gross per 24 hour   Intake 720 ml   Output --   Net 720 ml      Physical Exam  Vitals and nursing note reviewed.   Constitutional:       General: She is not in acute distress.     Appearance: She is ill-appearing. She is not toxic-appearing.   HENT:      Head: Normocephalic and atraumatic.   Cardiovascular:      Rate and Rhythm: Normal rate.   Pulmonary:       Effort: Pulmonary effort is normal. No respiratory distress.   Abdominal:      Palpations: Abdomen is soft.      Tenderness: There is no abdominal tenderness.   Musculoskeletal:      Right lower leg: No edema.      Left lower leg: No edema.      Comments: Lue fistula with palpable thrill   Skin:     General: Skin is warm.   Neurological:      Mental Status: She is alert and oriented to person, place, and time. Mental status is at baseline.      Motor: Weakness present.   Psychiatric:         Mood and Affect: Mood normal.         Behavior: Behavior normal.       Significant Labs: All pertinent labs within the past 24 hours have been reviewed.  CBC:   Recent Labs   Lab 07/17/22 2321 07/18/22  0338 07/19/22  0736   WBC 5.97 5.77 5.44   HGB 11.4* 11.1* 11.0*   HCT 34.5* 32.4* 33.8*    246 280     CMP:   Recent Labs   Lab 07/17/22 2321 07/18/22 0338 07/18/22  1322 07/18/22  1732 07/19/22  0555   *   < > 129* 133* 128*   K 4.6   < > 4.6 4.6 4.8   CL 96   < > 95 96 96   CO2 21*   < > 19* 18* 17*   *   < > 268* 141* 217*   BUN 38*   < > 52* 49* 61*   CREATININE 6.5*   < > 7.5* 7.8* 8.4*   CALCIUM 8.6*   < > 8.7 8.9 8.7   PROT 5.9*  --   --   --   --    ALBUMIN 2.9*  --   --   --   --    BILITOT 0.6  --   --   --   --    ALKPHOS 82  --   --   --   --    AST 18  --   --   --   --    ALT 24  --   --   --   --    ANIONGAP 13   < > 15 19* 15   EGFRNONAA 6*   < > 5* 5* 4*    < > = values in this interval not displayed.         Significant Imaging: I have reviewed all pertinent imaging results/findings within the past 24 hours.      Assessment/Plan:      * NSTEMI (non-ST elevated myocardial infarction)  Cardiology consulted  Cont Heparin drip , asa , plavix and BB  TTE  Trend troponin level   S/P LHC which multiple vessel ds   CVT consulted     Plan for CABG   Transfer care post cabg    Metabolic acidosis  Cont HD        CAD (coronary artery disease)  Cont asa , bb and statin  S/P LHC which show multiple  vessel ds   CVT consulted . Plan for CABG pending tomorrow          ESRD (end stage renal disease)  Nephrology consulted for mwf maintenance   HD per nephrology   Off schedule for now due to impending surgery  Received 1 unit of blood post dialysis  Per nephrology, recommend transfusion during dialysis if possible  Hb/hct stable     CTS requesting dialysis today   Nephrology aware    Hypertension associated with diabetes  Controlled to mildly elevated blood pressure  Uncontrolled diabetes mellitus   Cont beta blocker  Monitor after hd   Avoid hypoglycemia pending cabg        VTE Risk Mitigation (From admission, onward)         Ordered     Place sequential compression device  Until discontinued         07/17/22 2118     Place DYANA hose  Until discontinued         07/12/22 1637     Place sequential compression device  Until discontinued         07/12/22 1637     Place sequential compression device  Until discontinued         07/12/22 1216                Discharge Planning   MARIBELL:      Code Status: Full Code   Is the patient medically ready for discharge?:     Reason for patient still in hospital (select all that apply): Patient trending condition, Treatment, Consult recommendations and Pending disposition  Discharge Plan A: Home with family                  Facundo Dunlap MD  Department of Hospital Medicine   [facility]

## 2022-07-19 NOTE — PROGRESS NOTES
[facility]  Nephrology  Progress Note    Patient Name: Avril Monzon  MRN: [id number]  Admission Date: 7/12/2022  Hospital Length of Stay: 7 days  Attending Provider: Facundo Dunlap MD   Primary Care Physician: Rose Parks MD  Principal Problem:NSTEMI (non-ST elevated myocardial infarction)    Subjective:     HPI: Noted    Interval History: Pt was seen and examined. Labs and meds reviewed. Discussed with other providers/CT surgery. No new c/o's, no SOB. Discussed with HD nurse.    Review of patient's allergies indicates:   Allergen Reactions    Codeine Swelling    Darvon [propoxyphene] Swelling    Atorvastatin      Muscle twitching     Current Facility-Administered Medications   Medication Frequency    0.9%  NaCl infusion (for blood administration) Q24H PRN    0.9%  NaCl infusion Continuous PRN    acetaminophen tablet 650 mg Q4H PRN    aspirin EC tablet 81 mg Daily    atropine injection 0.5 mg PRN    dextrose 10 % infusion PRN    dextrose 10 % infusion PRN    dextrose 10% bolus 125 mL PRN    dextrose 10% bolus 250 mL PRN    EPINEPHrine (ADRENALIN) 5 mg in dextrose 5 % 250 mL infusion Continuous    famotidine tablet 20 mg Daily    glucagon (human recombinant) injection 1 mg PRN    glucose chewable tablet 16 g PRN    glucose chewable tablet 24 g PRN    hydrALAZINE tablet 25 mg Q12H    insulin aspart U-100 pen 0-5 Units QID (AC + HS) PRN    insulin detemir U-100 pen 6 Units QHS    metoprolol tartrate (LOPRESSOR) tablet 50 mg BID    mupirocin 2 % ointment BID    nitroGLYCERIN SL tablet 0.4 mg Q5 Min PRN    promethazine (PHENERGAN) 12.5 mg in dextrose 5 % 50 mL IVPB Q6H PRN    senna-docusate 8.6-50 mg per tablet 2 tablet Daily    sodium chloride 0.9% flush 10 mL PRN    vitamin renal formula (B-complex-vitamin c-folic acid) 1 mg per capsule 1 capsule Daily       Objective:     Vital Signs (Most Recent):  Temp: 97.9 °F (36.6 °C) (07/19/22 0812)  Pulse: 65 (07/19/22 0923)  Resp: 18  (07/19/22 0812)  BP: (!) 113/56 (07/19/22 0812)  SpO2: 100 % (07/19/22 0812)  O2 Device (Oxygen Therapy): room air (07/18/22 2100)   Vital Signs (24h Range):  Temp:  [97 °F (36.1 °C)-98.2 °F (36.8 °C)] 97.9 °F (36.6 °C)  Pulse:  [64-70] 65  Resp:  [16-18] 18  SpO2:  [97 %-100 %] 100 %  BP: (113-141)/(56-64) 113/56     Weight: 61.9 kg (136 lb 7.4 oz) (07/16/22 0003)  Body mass index is 22.71 kg/m².  Body surface area is 1.68 meters squared.    I/O last 3 completed shifts:  In: 720 [P.O.:720]  Out: -     Physical Exam  Vitals and nursing note reviewed.   Constitutional:       Appearance: Normal appearance.   HENT:      Head: Normocephalic and atraumatic.   Cardiovascular:      Rate and Rhythm: Normal rate and regular rhythm.      Pulses: Normal pulses.      Heart sounds: Normal heart sounds.   Pulmonary:      Effort: Pulmonary effort is normal.      Breath sounds: Normal breath sounds.   Abdominal:      General: Abdomen is flat.      Palpations: Abdomen is soft.      Tenderness: There is no abdominal tenderness.   Musculoskeletal:      Right lower leg: No edema.      Left lower leg: No edema.   Neurological:      Mental Status: She is alert.       Significant Labs: reviewed  BMP  Lab Results   Component Value Date     (L) 07/19/2022    K 4.8 07/19/2022    CL 96 07/19/2022    CO2 17 (L) 07/19/2022    BUN 61 (H) 07/19/2022    CREATININE 8.4 (H) 07/19/2022    CALCIUM 8.7 07/19/2022    ANIONGAP 15 07/19/2022    ESTGFRAFRICA 5 (A) 07/19/2022    EGFRNONAA 4 (A) 07/19/2022     Lab Results   Component Value Date    WBC 5.44 07/19/2022    HGB 11.0 (L) 07/19/2022    HCT 33.8 (L) 07/19/2022    MCV 94 07/19/2022     07/19/2022           Assessment/Plan:     78 y/o female with ESRD on chronic HD presented with acute coronary syndrome:     ESRD (end stage renal disease)  ESRD pt on chronic HD q MWF at [facility] normal  O2 sat OK  Continue HD q MWF and today per CT surgery request  Tolerating HD well, continue  HD  Expect will be on CRRT after CABG. Will provide     HTN: BP controlled to low  Meds reviewed  Will hold hydralazine 25 mg po bid     Anemia: mild, normocytic  On epogen with HD      * Acute coronary events  Presented with SOB   NSTEMI  Troponin very high  Reviewed cardiology and CT surgery notes  CABG planned for tomorrow  CRRT post CABG      Type 2 diabetes mellitus with hyperglycemia, with long-term current use of insulin  Reviewed the chart.        Plans and recommendations:  As discussed above  Total time spent 40 minutes including time needed to review the records, the   patient evaluation, documentation, face-to-face discussion with the patient,   more than 50% of the time was spent on coordination of care and counseling.          Angela Hanson MD  Nephrology  [facility]

## 2022-07-19 NOTE — ASSESSMENT & PLAN NOTE
Nephrology consulted for mwf maintenance   HD per nephrology   Off schedule for now due to impending surgery  Received 1 unit of blood post dialysis  Per nephrology, recommend transfusion during dialysis if possible  Hb/hct stable     CTS requesting dialysis today   Nephrology aware

## 2022-07-19 NOTE — ASSESSMENT & PLAN NOTE
78 y/o female with ESRD on chronic HD presented with acute coronary syndrome:     ESRD (end stage renal disease)  ESRD pt on chronic HD q MWF at [facility]  S/p HD yesterday  K normal  O2 sat OK  No indications for HD today  Continue HD q MWF     HTN: BP controlled     Anemia: mild, normocytic  On epogen with HD      * Acute coronary events  Presented with SOB   NSTEMI  Troponin very high  Reviewed cardiology and CT surgery notes  CABG planned      Type 2 diabetes mellitus with hyperglycemia, with long-term current use of insulin  Reviewed the chart.        Plans and recommendations:  As discussed above  Total time spent 40 minutes including time needed to review the records, the   patient evaluation, documentation, face-to-face discussion with the patient,   more than 50% of the time was spent on coordination of care and counseling.

## 2022-07-19 NOTE — PROGRESS NOTES
07/19/22 1610   Required for all Hemodialysis Patients   Hepatitis Status other (see comments)   Handoff Report   Received From Jesse Del Rosario RN   Given To Diana Kelley RN   Treatment Type   Treatment Type Acute   Vital Signs   Temp 97.4 °F (36.3 °C)   Temp src Oral   Pulse 60   Heart Rate Source Monitor   Resp 19   SpO2 99 %   Pulse Oximetry Type Intermittent   O2 Device (Oxygen Therapy) room air   BP (!) 143/56   MAP (mmHg) 91   BP Location Right arm   BP Method Automatic   Patient Position Lying   Post-Hemodialysis Assessment   Rinseback Volume (mL) 250 mL   Blood Volume Processed (Liters) 81.8 L   Dialyzer Clearance Moderately streaked   Additional Fluid Intake (mL) 500 mL   Total UF (mL) 3007 mL   Net Fluid Removal 2507   Patient Response to Treatment well tolerated   Post-Hemodialysis Comments 240 min

## 2022-07-19 NOTE — SUBJECTIVE & OBJECTIVE
Interval History: Pt was seen and examined. Labs and meds reviewed. Discussed with other providers/CT surgery. No new c/o's, no SOB. Discussed with HD nurse.    Review of patient's allergies indicates:   Allergen Reactions    Codeine Swelling    Darvon [propoxyphene] Swelling    Atorvastatin      Muscle twitching     Current Facility-Administered Medications   Medication Frequency    0.9%  NaCl infusion (for blood administration) Q24H PRN    0.9%  NaCl infusion Continuous PRN    acetaminophen tablet 650 mg Q4H PRN    aspirin EC tablet 81 mg Daily    atropine injection 0.5 mg PRN    dextrose 10 % infusion PRN    dextrose 10 % infusion PRN    dextrose 10% bolus 125 mL PRN    dextrose 10% bolus 250 mL PRN    EPINEPHrine (ADRENALIN) 5 mg in dextrose 5 % 250 mL infusion Continuous    famotidine tablet 20 mg Daily    glucagon (human recombinant) injection 1 mg PRN    glucose chewable tablet 16 g PRN    glucose chewable tablet 24 g PRN    hydrALAZINE tablet 25 mg Q12H    insulin aspart U-100 pen 0-5 Units QID (AC + HS) PRN    insulin detemir U-100 pen 6 Units QHS    metoprolol tartrate (LOPRESSOR) tablet 50 mg BID    mupirocin 2 % ointment BID    nitroGLYCERIN SL tablet 0.4 mg Q5 Min PRN    promethazine (PHENERGAN) 12.5 mg in dextrose 5 % 50 mL IVPB Q6H PRN    senna-docusate 8.6-50 mg per tablet 2 tablet Daily    sodium chloride 0.9% flush 10 mL PRN    vitamin renal formula (B-complex-vitamin c-folic acid) 1 mg per capsule 1 capsule Daily       Objective:     Vital Signs (Most Recent):  Temp: 97.9 °F (36.6 °C) (07/19/22 0812)  Pulse: 65 (07/19/22 0923)  Resp: 18 (07/19/22 0812)  BP: (!) 113/56 (07/19/22 0812)  SpO2: 100 % (07/19/22 0812)  O2 Device (Oxygen Therapy): room air (07/18/22 2100)   Vital Signs (24h Range):  Temp:  [97 °F (36.1 °C)-98.2 °F (36.8 °C)] 97.9 °F (36.6 °C)  Pulse:  [64-70] 65  Resp:  [16-18] 18  SpO2:  [97 %-100 %] 100 %  BP: (113-141)/(56-64) 113/56     Weight: 61.9 kg (136 lb 7.4 oz) (07/16/22  0003)  Body mass index is 22.71 kg/m².  Body surface area is 1.68 meters squared.    I/O last 3 completed shifts:  In: 720 [P.O.:720]  Out: -     Physical Exam  Vitals and nursing note reviewed.   Constitutional:       Appearance: Normal appearance.   HENT:      Head: Normocephalic and atraumatic.   Cardiovascular:      Rate and Rhythm: Normal rate and regular rhythm.      Pulses: Normal pulses.      Heart sounds: Normal heart sounds.   Pulmonary:      Effort: Pulmonary effort is normal.      Breath sounds: Normal breath sounds.   Abdominal:      General: Abdomen is flat.      Palpations: Abdomen is soft.      Tenderness: There is no abdominal tenderness.   Musculoskeletal:      Right lower leg: No edema.      Left lower leg: No edema.   Neurological:      Mental Status: She is alert.       Significant Labs: reviewed  BMP  Lab Results   Component Value Date     (L) 07/19/2022    K 4.8 07/19/2022    CL 96 07/19/2022    CO2 17 (L) 07/19/2022    BUN 61 (H) 07/19/2022    CREATININE 8.4 (H) 07/19/2022    CALCIUM 8.7 07/19/2022    ANIONGAP 15 07/19/2022    ESTGFRAFRICA 5 (A) 07/19/2022    EGFRNONAA 4 (A) 07/19/2022     Lab Results   Component Value Date    WBC 5.44 07/19/2022    HGB 11.0 (L) 07/19/2022    HCT 33.8 (L) 07/19/2022    MCV 94 07/19/2022     07/19/2022

## 2022-07-19 NOTE — ASSESSMENT & PLAN NOTE
Cont asa , bb and statin  S/P LHC which show multiple vessel ds   CVT consulted . Plan for CABG pending tomorrow

## 2022-07-19 NOTE — PHYSICIAN QUERY
PT Name: Avril Monzon  MR #: [id number]    DOCUMENTATION CLARIFICATION      CDS/: Lauren Quintero RN, CCDS              Contact information: [email or web address]    This form is a permanent document in the medical record.     Query Date: July 19, 2022    By submitting this query, we are merely seeking further clarification of documentation. Please utilize your independent clinical judgment when addressing the question(s) below.    The Medical Record contains the following:   Indicators   Supporting Clinical Findings Location in Medical Record   X Hypertension associated with diabetes documented Hypertension associated with diabetes 7/12 h/p, 7/13-7/19 prog notes   X Chronic condition(s)  pmh of ESRD on HD, DM2, hyperlipidemia,CAD , Tobacco abuse , HTN   and h/o CVA  7/12 h/p    Vital signs     X Treatment/Medication Start DKA protocol   Cont insulin drip  7/12 h/p    Other     Provider, please clarify the relationship between the Hypertension and Diabetes Mellitus  [ x  ] Hypertension is a manifestation of Diabetes Mellitus (Secondary Hypertension)   [   ]  Hypertension is not a manifestation of Diabetes Mellitus (Essential Hypertension)   [   ] Other Clarification (please specify): ____________   [  ] Clinically Undetermined       Please document in your progress notes daily for the duration of treatment, until resolved, and include in your discharge summary.

## 2022-07-19 NOTE — SUBJECTIVE & OBJECTIVE
Interval History: See hospital course for today      Review of Systems   Constitutional:  Negative for activity change, appetite change, fatigue and fever.   Respiratory:  Negative for shortness of breath.    Cardiovascular:  Negative for chest pain and leg swelling.   Gastrointestinal:  Positive for constipation. Negative for abdominal pain, nausea and vomiting.        Indigestion improving   Musculoskeletal:  Negative for gait problem.   Neurological:  Negative for weakness.   Psychiatric/Behavioral:  Negative for confusion, decreased concentration and dysphoric mood. The patient is not nervous/anxious.    Objective:     Vital Signs (Most Recent):  Temp: 97.9 °F (36.6 °C) (07/19/22 0812)  Pulse: 66 (07/19/22 0812)  Resp: 18 (07/19/22 0812)  BP: (!) 113/56 (07/19/22 0812)  SpO2: 100 % (07/19/22 0812)   Vital Signs (24h Range):  Temp:  [97 °F (36.1 °C)-98.2 °F (36.8 °C)] 97.9 °F (36.6 °C)  Pulse:  [64-70] 66  Resp:  [16-18] 18  SpO2:  [97 %-100 %] 100 %  BP: (113-141)/(56-64) 113/56     Weight: 61.9 kg (136 lb 7.4 oz)  Body mass index is 22.71 kg/m².    Intake/Output Summary (Last 24 hours) at 7/19/2022 0938  Last data filed at 7/18/2022 1700  Gross per 24 hour   Intake 720 ml   Output --   Net 720 ml      Physical Exam  Vitals and nursing note reviewed.   Constitutional:       General: She is not in acute distress.     Appearance: She is ill-appearing. She is not toxic-appearing.   HENT:      Head: Normocephalic and atraumatic.   Cardiovascular:      Rate and Rhythm: Normal rate.   Pulmonary:      Effort: Pulmonary effort is normal. No respiratory distress.   Abdominal:      Palpations: Abdomen is soft.      Tenderness: There is no abdominal tenderness.   Musculoskeletal:      Right lower leg: No edema.      Left lower leg: No edema.      Comments: Lue fistula with palpable thrill   Skin:     General: Skin is warm.   Neurological:      Mental Status: She is alert and oriented to person, place, and time. Mental  status is at baseline.      Motor: Weakness present.   Psychiatric:         Mood and Affect: Mood normal.         Behavior: Behavior normal.       Significant Labs: All pertinent labs within the past 24 hours have been reviewed.  CBC:   Recent Labs   Lab 07/17/22 2321 07/18/22  0338 07/19/22  0736   WBC 5.97 5.77 5.44   HGB 11.4* 11.1* 11.0*   HCT 34.5* 32.4* 33.8*    246 280     CMP:   Recent Labs   Lab 07/17/22 2321 07/18/22  0338 07/18/22  1322 07/18/22  1732 07/19/22  0555   *   < > 129* 133* 128*   K 4.6   < > 4.6 4.6 4.8   CL 96   < > 95 96 96   CO2 21*   < > 19* 18* 17*   *   < > 268* 141* 217*   BUN 38*   < > 52* 49* 61*   CREATININE 6.5*   < > 7.5* 7.8* 8.4*   CALCIUM 8.6*   < > 8.7 8.9 8.7   PROT 5.9*  --   --   --   --    ALBUMIN 2.9*  --   --   --   --    BILITOT 0.6  --   --   --   --    ALKPHOS 82  --   --   --   --    AST 18  --   --   --   --    ALT 24  --   --   --   --    ANIONGAP 13   < > 15 19* 15   EGFRNONAA 6*   < > 5* 5* 4*    < > = values in this interval not displayed.         Significant Imaging: I have reviewed all pertinent imaging results/findings within the past 24 hours.

## 2022-07-20 ENCOUNTER — ANESTHESIA (OUTPATIENT)
Dept: SURGERY | Facility: HOSPITAL | Age: 77
DRG: 233 | End: 2022-07-20
Payer: MEDICARE

## 2022-07-20 PROBLEM — Z95.1 S/P CABG (CORONARY ARTERY BYPASS GRAFT): Status: ACTIVE | Noted: 2022-07-20

## 2022-07-20 PROBLEM — Z99.11 ON MECHANICALLY ASSISTED VENTILATION: Status: ACTIVE | Noted: 2022-07-20

## 2022-07-20 LAB
ABO GROUP BLD: NORMAL
ALBUMIN SERPL BCP-MCNC: 4.2 G/DL (ref 3.5–5.2)
ALLENS TEST: ABNORMAL
ANION GAP SERPL CALC-SCNC: 10 MMOL/L (ref 8–16)
ANION GAP SERPL CALC-SCNC: 17 MMOL/L (ref 8–16)
ANION GAP SERPL CALC-SCNC: 17 MMOL/L (ref 8–16)
ANION GAP SERPL CALC-SCNC: 18 MMOL/L (ref 8–16)
APTT BLDCRRT: 30.6 SEC (ref 21–32)
APTT BLDCRRT: >150 SEC (ref 21–32)
BASOPHILS # BLD AUTO: 0.02 K/UL (ref 0–0.2)
BASOPHILS # BLD AUTO: 0.03 K/UL (ref 0–0.2)
BASOPHILS NFR BLD: 0.2 % (ref 0–1.9)
BASOPHILS NFR BLD: 0.4 % (ref 0–1.9)
BLD GP AB SCN CELLS X3 SERPL QL: NORMAL
BLD PROD TYP BPU: NORMAL
BLOOD UNIT EXPIRATION DATE: NORMAL
BLOOD UNIT TYPE CODE: 2800
BLOOD UNIT TYPE CODE: 6200
BLOOD UNIT TYPE CODE: 8400
BLOOD UNIT TYPE: NORMAL
BUN SERPL-MCNC: 27 MG/DL (ref 8–23)
BUN SERPL-MCNC: 30 MG/DL (ref 8–23)
BUN SERPL-MCNC: 33 MG/DL (ref 8–23)
BUN SERPL-MCNC: 35 MG/DL (ref 8–23)
CALCIUM SERPL-MCNC: 8.4 MG/DL (ref 8.7–10.5)
CALCIUM SERPL-MCNC: 8.6 MG/DL (ref 8.7–10.5)
CALCIUM SERPL-MCNC: 8.7 MG/DL (ref 8.7–10.5)
CALCIUM SERPL-MCNC: 9.1 MG/DL (ref 8.7–10.5)
CHLORIDE SERPL-SCNC: 100 MMOL/L (ref 95–110)
CHLORIDE SERPL-SCNC: 101 MMOL/L (ref 95–110)
CHLORIDE SERPL-SCNC: 104 MMOL/L (ref 95–110)
CHLORIDE SERPL-SCNC: 99 MMOL/L (ref 95–110)
CO2 SERPL-SCNC: 15 MMOL/L (ref 23–29)
CO2 SERPL-SCNC: 17 MMOL/L (ref 23–29)
CO2 SERPL-SCNC: 17 MMOL/L (ref 23–29)
CO2 SERPL-SCNC: 25 MMOL/L (ref 23–29)
CODING SYSTEM: NORMAL
CREAT SERPL-MCNC: 4.4 MG/DL (ref 0.5–1.4)
CREAT SERPL-MCNC: 5.4 MG/DL (ref 0.5–1.4)
CREAT SERPL-MCNC: 5.5 MG/DL (ref 0.5–1.4)
CREAT SERPL-MCNC: 5.8 MG/DL (ref 0.5–1.4)
DELSYS: ABNORMAL
DIFFERENTIAL METHOD: ABNORMAL
DIFFERENTIAL METHOD: ABNORMAL
DISPENSE STATUS: NORMAL
EOSINOPHIL # BLD AUTO: 0 K/UL (ref 0–0.5)
EOSINOPHIL # BLD AUTO: 0.2 K/UL (ref 0–0.5)
EOSINOPHIL NFR BLD: 0.1 % (ref 0–8)
EOSINOPHIL NFR BLD: 3 % (ref 0–8)
ERYTHROCYTE [DISTWIDTH] IN BLOOD BY AUTOMATED COUNT: 16 % (ref 11.5–14.5)
ERYTHROCYTE [DISTWIDTH] IN BLOOD BY AUTOMATED COUNT: 16.3 % (ref 11.5–14.5)
ERYTHROCYTE [SEDIMENTATION RATE] IN BLOOD BY WESTERGREN METHOD: 20 MM/H
EST. GFR  (AFRICAN AMERICAN): 11 ML/MIN/1.73 M^2
EST. GFR  (AFRICAN AMERICAN): 8 ML/MIN/1.73 M^2
EST. GFR  (NON AFRICAN AMERICAN): 7 ML/MIN/1.73 M^2
EST. GFR  (NON AFRICAN AMERICAN): 9 ML/MIN/1.73 M^2
FIBRINOGEN PPP-MCNC: 165 MG/DL (ref 182–400)
FIBRINOGEN PPP-MCNC: 290 MG/DL (ref 182–400)
FIO2: 100
GLUCOSE SERPL-MCNC: 102 MG/DL (ref 70–110)
GLUCOSE SERPL-MCNC: 163 MG/DL (ref 70–110)
GLUCOSE SERPL-MCNC: 170 MG/DL (ref 70–110)
GLUCOSE SERPL-MCNC: 194 MG/DL (ref 70–110)
GLUCOSE SERPL-MCNC: 222 MG/DL (ref 70–110)
GLUCOSE SERPL-MCNC: 259 MG/DL (ref 70–110)
GLUCOSE SERPL-MCNC: 261 MG/DL (ref 70–110)
GLUCOSE SERPL-MCNC: 285 MG/DL (ref 70–110)
GLUCOSE SERPL-MCNC: 296 MG/DL (ref 70–110)
GLUCOSE SERPL-MCNC: 384 MG/DL (ref 70–110)
GLUCOSE SERPL-MCNC: 58 MG/DL (ref 70–110)
GLUCOSE SERPL-MCNC: 74 MG/DL (ref 70–110)
HCO3 UR-SCNC: 21.5 MMOL/L (ref 24–28)
HCO3 UR-SCNC: 22.8 MMOL/L (ref 24–28)
HCO3 UR-SCNC: 22.9 MMOL/L (ref 24–28)
HCO3 UR-SCNC: 23.3 MMOL/L (ref 24–28)
HCO3 UR-SCNC: 23.7 MMOL/L (ref 24–28)
HCO3 UR-SCNC: 23.9 MMOL/L (ref 24–28)
HCO3 UR-SCNC: 24.1 MMOL/L (ref 24–28)
HCO3 UR-SCNC: 24.9 MMOL/L (ref 24–28)
HCT VFR BLD AUTO: 31.7 % (ref 37–48.5)
HCT VFR BLD AUTO: 33.5 % (ref 37–48.5)
HCT VFR BLD CALC: 22 %PCV (ref 36–54)
HCT VFR BLD CALC: 23 %PCV (ref 36–54)
HCT VFR BLD CALC: 24 %PCV (ref 36–54)
HCT VFR BLD CALC: 24 %PCV (ref 36–54)
HCT VFR BLD CALC: 30 %PCV (ref 36–54)
HCT VFR BLD CALC: 30 %PCV (ref 36–54)
HCT VFR BLD CALC: 33 %PCV (ref 36–54)
HCT VFR BLD CALC: 33 %PCV (ref 36–54)
HGB BLD-MCNC: 10.9 G/DL (ref 12–16)
HGB BLD-MCNC: 11.1 G/DL (ref 12–16)
IMM GRANULOCYTES # BLD AUTO: 0.06 K/UL (ref 0–0.04)
IMM GRANULOCYTES # BLD AUTO: 0.11 K/UL (ref 0–0.04)
IMM GRANULOCYTES NFR BLD AUTO: 0.7 % (ref 0–0.5)
IMM GRANULOCYTES NFR BLD AUTO: 1.2 % (ref 0–0.5)
INR PPP: 1.1 (ref 0.8–1.2)
INR PPP: 1.4 (ref 0.8–1.2)
LYMPHOCYTES # BLD AUTO: 0.9 K/UL (ref 1–4.8)
LYMPHOCYTES # BLD AUTO: 1 K/UL (ref 1–4.8)
LYMPHOCYTES NFR BLD: 17.1 % (ref 18–48)
LYMPHOCYTES NFR BLD: 6.2 % (ref 18–48)
MAGNESIUM SERPL-MCNC: 1.7 MG/DL (ref 1.6–2.6)
MAGNESIUM SERPL-MCNC: 1.7 MG/DL (ref 1.6–2.6)
MAGNESIUM SERPL-MCNC: 2.3 MG/DL (ref 1.6–2.6)
MCH RBC QN AUTO: 30.2 PG (ref 27–31)
MCH RBC QN AUTO: 30.2 PG (ref 27–31)
MCHC RBC AUTO-ENTMCNC: 33.1 G/DL (ref 32–36)
MCHC RBC AUTO-ENTMCNC: 34.4 G/DL (ref 32–36)
MCV RBC AUTO: 88 FL (ref 82–98)
MCV RBC AUTO: 91 FL (ref 82–98)
METHEMOGLOBIN: 0.2 % (ref 0–3)
MODE: ABNORMAL
MONOCYTES # BLD AUTO: 0.6 K/UL (ref 0.3–1)
MONOCYTES # BLD AUTO: 1.3 K/UL (ref 0.3–1)
MONOCYTES NFR BLD: 12.1 % (ref 4–15)
MONOCYTES NFR BLD: 8.1 % (ref 4–15)
NEUTROPHILS # BLD AUTO: 13.5 K/UL (ref 1.8–7.7)
NEUTROPHILS # BLD AUTO: 3.3 K/UL (ref 1.8–7.7)
NEUTROPHILS NFR BLD: 66.2 % (ref 38–73)
NEUTROPHILS NFR BLD: 84.7 % (ref 38–73)
NRBC BLD-RTO: 0 /100 WBC
NRBC BLD-RTO: 0 /100 WBC
NUM UNITS TRANS FFP: NORMAL
NUM UNITS TRANS FFP: NORMAL
NUM UNITS TRANS PACKED RBC: NORMAL
PCO2 BLDA: 37.2 MMHG (ref 35–45)
PCO2 BLDA: 37.4 MMHG (ref 35–45)
PCO2 BLDA: 38.8 MMHG (ref 35–45)
PCO2 BLDA: 40.3 MMHG (ref 35–45)
PCO2 BLDA: 41.4 MMHG (ref 35–45)
PCO2 BLDA: 42.7 MMHG (ref 35–45)
PCO2 BLDA: 45.8 MMHG (ref 35–45)
PCO2 BLDA: 47.8 MMHG (ref 35–45)
PEEP: 5
PH SMN: 7.26 [PH] (ref 7.35–7.45)
PH SMN: 7.31 [PH] (ref 7.35–7.45)
PH SMN: 7.34 [PH] (ref 7.35–7.45)
PH SMN: 7.35 [PH] (ref 7.35–7.45)
PH SMN: 7.38 [PH] (ref 7.35–7.45)
PH SMN: 7.39 [PH] (ref 7.35–7.45)
PH SMN: 7.42 [PH] (ref 7.35–7.45)
PH SMN: 7.43 [PH] (ref 7.35–7.45)
PHOSPHATE SERPL-MCNC: 2.5 MG/DL (ref 2.7–4.5)
PHOSPHATE SERPL-MCNC: 4.8 MG/DL (ref 2.7–4.5)
PLATELET # BLD AUTO: 210 K/UL (ref 150–450)
PLATELET # BLD AUTO: 259 K/UL (ref 150–450)
PMV BLD AUTO: 8.9 FL (ref 9.2–12.9)
PMV BLD AUTO: 9.2 FL (ref 9.2–12.9)
PO2 BLDA: 276 MMHG (ref 80–100)
PO2 BLDA: 309 MMHG (ref 80–100)
PO2 BLDA: 357 MMHG (ref 80–100)
PO2 BLDA: 359 MMHG (ref 80–100)
PO2 BLDA: 420 MMHG (ref 80–100)
PO2 BLDA: 450 MMHG (ref 80–100)
PO2 BLDA: 476 MMHG (ref 80–100)
PO2 BLDA: 525 MMHG (ref 80–100)
POC ACTIVATED CLOTTING TIME K: 115 SEC (ref 74–137)
POC ACTIVATED CLOTTING TIME K: 115 SEC (ref 74–137)
POC ACTIVATED CLOTTING TIME K: 266 SEC (ref 74–137)
POC ACTIVATED CLOTTING TIME K: 387 SEC (ref 74–137)
POC ACTIVATED CLOTTING TIME K: 416 SEC (ref 74–137)
POC ACTIVATED CLOTTING TIME K: 532 SEC (ref 74–137)
POC ACTIVATED CLOTTING TIME K: 543 SEC (ref 74–137)
POC ACTIVATED CLOTTING TIME K: 584 SEC (ref 74–137)
POC BE: -1 MMOL/L
POC BE: -1 MMOL/L
POC BE: -2 MMOL/L
POC BE: -3 MMOL/L
POC BE: -3 MMOL/L
POC BE: -6 MMOL/L
POC BE: 0 MMOL/L
POC BE: 1 MMOL/L
POC IONIZED CALCIUM: 1.06 MMOL/L (ref 1.06–1.42)
POC IONIZED CALCIUM: 1.06 MMOL/L (ref 1.06–1.42)
POC IONIZED CALCIUM: 1.1 MMOL/L (ref 1.06–1.42)
POC IONIZED CALCIUM: 1.14 MMOL/L (ref 1.06–1.42)
POC IONIZED CALCIUM: 1.14 MMOL/L (ref 1.06–1.42)
POC IONIZED CALCIUM: 1.22 MMOL/L (ref 1.06–1.42)
POC IONIZED CALCIUM: 1.23 MMOL/L (ref 1.06–1.42)
POC IONIZED CALCIUM: 1.34 MMOL/L (ref 1.06–1.42)
POC SATURATED O2: 100 % (ref 95–100)
POCT GLUCOSE: 210 MG/DL (ref 70–110)
POCT GLUCOSE: 240 MG/DL (ref 70–110)
POCT GLUCOSE: 298 MG/DL (ref 70–110)
POCT GLUCOSE: 365 MG/DL (ref 70–110)
POOLED CRYOPPT GVN BPU: NORMAL
POTASSIUM BLD-SCNC: 3.2 MMOL/L (ref 3.5–5.1)
POTASSIUM BLD-SCNC: 3.3 MMOL/L (ref 3.5–5.1)
POTASSIUM BLD-SCNC: 3.4 MMOL/L (ref 3.5–5.1)
POTASSIUM BLD-SCNC: 3.5 MMOL/L (ref 3.5–5.1)
POTASSIUM BLD-SCNC: 3.7 MMOL/L (ref 3.5–5.1)
POTASSIUM BLD-SCNC: 4 MMOL/L (ref 3.5–5.1)
POTASSIUM SERPL-SCNC: 3.3 MMOL/L (ref 3.5–5.1)
POTASSIUM SERPL-SCNC: 3.5 MMOL/L (ref 3.5–5.1)
POTASSIUM SERPL-SCNC: 3.7 MMOL/L (ref 3.5–5.1)
POTASSIUM SERPL-SCNC: 4.4 MMOL/L (ref 3.5–5.1)
PROTHROMBIN TIME: 11.6 SEC (ref 9–12.5)
PROTHROMBIN TIME: 14.3 SEC (ref 9–12.5)
RBC # BLD AUTO: 3.61 M/UL (ref 4–5.4)
RBC # BLD AUTO: 3.67 M/UL (ref 4–5.4)
RH BLD: NORMAL
SAMPLE: ABNORMAL
SAMPLE: NORMAL
SAMPLE: NORMAL
SITE: ABNORMAL
SODIUM BLD-SCNC: 130 MMOL/L (ref 136–145)
SODIUM BLD-SCNC: 132 MMOL/L (ref 136–145)
SODIUM BLD-SCNC: 132 MMOL/L (ref 136–145)
SODIUM BLD-SCNC: 133 MMOL/L (ref 136–145)
SODIUM BLD-SCNC: 134 MMOL/L (ref 136–145)
SODIUM SERPL-SCNC: 134 MMOL/L (ref 136–145)
SODIUM SERPL-SCNC: 134 MMOL/L (ref 136–145)
SODIUM SERPL-SCNC: 135 MMOL/L (ref 136–145)
SODIUM SERPL-SCNC: 137 MMOL/L (ref 136–145)
VT: 340
WBC # BLD AUTO: 15.94 K/UL (ref 3.9–12.7)
WBC # BLD AUTO: 4.97 K/UL (ref 3.9–12.7)

## 2022-07-20 PROCEDURE — 85025 COMPLETE CBC W/AUTO DIFF WBC: CPT | Performed by: THORACIC SURGERY (CARDIOTHORACIC VASCULAR SURGERY)

## 2022-07-20 PROCEDURE — 63600175 PHARM REV CODE 636 W HCPCS: Performed by: THORACIC SURGERY (CARDIOTHORACIC VASCULAR SURGERY)

## 2022-07-20 PROCEDURE — A4216 STERILE WATER/SALINE, 10 ML: HCPCS | Performed by: NURSE ANESTHETIST, CERTIFIED REGISTERED

## 2022-07-20 PROCEDURE — 25000003 PHARM REV CODE 250: Performed by: THORACIC SURGERY (CARDIOTHORACIC VASCULAR SURGERY)

## 2022-07-20 PROCEDURE — 27800903 OPTIME MED/SURG SUP & DEVICES OTHER IMPLANTS: Performed by: THORACIC SURGERY (CARDIOTHORACIC VASCULAR SURGERY)

## 2022-07-20 PROCEDURE — 33508 ENDOSCOPIC VEIN HARVEST: CPT | Mod: 59,,, | Performed by: THORACIC SURGERY (CARDIOTHORACIC VASCULAR SURGERY)

## 2022-07-20 PROCEDURE — 80048 BASIC METABOLIC PNL TOTAL CA: CPT | Performed by: THORACIC SURGERY (CARDIOTHORACIC VASCULAR SURGERY)

## 2022-07-20 PROCEDURE — 99900035 HC TECH TIME PER 15 MIN (STAT)

## 2022-07-20 PROCEDURE — 33533 PR CABG, ARTERIAL, SINGLE: ICD-10-PCS | Mod: ,,, | Performed by: THORACIC SURGERY (CARDIOTHORACIC VASCULAR SURGERY)

## 2022-07-20 PROCEDURE — 99291 CRITICAL CARE FIRST HOUR: CPT | Mod: ,,,

## 2022-07-20 PROCEDURE — P9012 CRYOPRECIPITATE EACH UNIT: HCPCS | Performed by: THORACIC SURGERY (CARDIOTHORACIC VASCULAR SURGERY)

## 2022-07-20 PROCEDURE — 36430 TRANSFUSION BLD/BLD COMPNT: CPT

## 2022-07-20 PROCEDURE — 85014 HEMATOCRIT: CPT

## 2022-07-20 PROCEDURE — 27000221 HC OXYGEN, UP TO 24 HOURS

## 2022-07-20 PROCEDURE — C9399 UNCLASSIFIED DRUGS OR BIOLOG: HCPCS | Performed by: THORACIC SURGERY (CARDIOTHORACIC VASCULAR SURGERY)

## 2022-07-20 PROCEDURE — 63600367 HC NITRIC OXIDE PER HOUR

## 2022-07-20 PROCEDURE — 63600175 PHARM REV CODE 636 W HCPCS: Performed by: INTERNAL MEDICINE

## 2022-07-20 PROCEDURE — 36000712 HC OR TIME LEV V 1ST 15 MIN: Performed by: THORACIC SURGERY (CARDIOTHORACIC VASCULAR SURGERY)

## 2022-07-20 PROCEDURE — 94640 AIRWAY INHALATION TREATMENT: CPT

## 2022-07-20 PROCEDURE — 33517 CABG ARTERY-VEIN SINGLE: CPT | Mod: ,,, | Performed by: THORACIC SURGERY (CARDIOTHORACIC VASCULAR SURGERY)

## 2022-07-20 PROCEDURE — C9290 INJ, BUPIVACAINE LIPOSOME: HCPCS | Performed by: THORACIC SURGERY (CARDIOTHORACIC VASCULAR SURGERY)

## 2022-07-20 PROCEDURE — 27201423 OPTIME MED/SURG SUP & DEVICES STERILE SUPPLY: Performed by: THORACIC SURGERY (CARDIOTHORACIC VASCULAR SURGERY)

## 2022-07-20 PROCEDURE — 27200667 HC PACEMAKER, TEMPORARY MONITORING, PER SHIFT

## 2022-07-20 PROCEDURE — 27200966 HC CLOSED SUCTION SYSTEM

## 2022-07-20 PROCEDURE — 86850 RBC ANTIBODY SCREEN: CPT | Performed by: THORACIC SURGERY (CARDIOTHORACIC VASCULAR SURGERY)

## 2022-07-20 PROCEDURE — 37000008 HC ANESTHESIA 1ST 15 MINUTES: Performed by: THORACIC SURGERY (CARDIOTHORACIC VASCULAR SURGERY)

## 2022-07-20 PROCEDURE — 99291 PR CRITICAL CARE, E/M 30-74 MINUTES: ICD-10-PCS | Mod: 25,,, | Performed by: INTERNAL MEDICINE

## 2022-07-20 PROCEDURE — P9016 RBC LEUKOCYTES REDUCED: HCPCS | Performed by: THORACIC SURGERY (CARDIOTHORACIC VASCULAR SURGERY)

## 2022-07-20 PROCEDURE — S0017 INJECTION, AMINOCAPROIC ACID: HCPCS | Performed by: NURSE ANESTHETIST, CERTIFIED REGISTERED

## 2022-07-20 PROCEDURE — 86965 POOLING BLOOD PLATELETS: CPT | Performed by: THORACIC SURGERY (CARDIOTHORACIC VASCULAR SURGERY)

## 2022-07-20 PROCEDURE — 90945 DIALYSIS ONE EVALUATION: CPT

## 2022-07-20 PROCEDURE — 80048 BASIC METABOLIC PNL TOTAL CA: CPT | Mod: 91 | Performed by: NURSE PRACTITIONER

## 2022-07-20 PROCEDURE — 80069 RENAL FUNCTION PANEL: CPT | Performed by: THORACIC SURGERY (CARDIOTHORACIC VASCULAR SURGERY)

## 2022-07-20 PROCEDURE — 25000003 PHARM REV CODE 250: Performed by: NURSE ANESTHETIST, CERTIFIED REGISTERED

## 2022-07-20 PROCEDURE — P9045 ALBUMIN (HUMAN), 5%, 250 ML: HCPCS | Mod: JG | Performed by: THORACIC SURGERY (CARDIOTHORACIC VASCULAR SURGERY)

## 2022-07-20 PROCEDURE — C1729 CATH, DRAINAGE: HCPCS | Performed by: THORACIC SURGERY (CARDIOTHORACIC VASCULAR SURGERY)

## 2022-07-20 PROCEDURE — 85730 THROMBOPLASTIN TIME PARTIAL: CPT | Mod: 91 | Performed by: THORACIC SURGERY (CARDIOTHORACIC VASCULAR SURGERY)

## 2022-07-20 PROCEDURE — P9017 PLASMA 1 DONOR FRZ W/IN 8 HR: HCPCS | Performed by: THORACIC SURGERY (CARDIOTHORACIC VASCULAR SURGERY)

## 2022-07-20 PROCEDURE — 33517 PR CABG, ARTERY-VEIN, SINGLE: ICD-10-PCS | Mod: ,,, | Performed by: THORACIC SURGERY (CARDIOTHORACIC VASCULAR SURGERY)

## 2022-07-20 PROCEDURE — 83735 ASSAY OF MAGNESIUM: CPT | Mod: 91 | Performed by: THORACIC SURGERY (CARDIOTHORACIC VASCULAR SURGERY)

## 2022-07-20 PROCEDURE — P9016 RBC LEUKOCYTES REDUCED: HCPCS | Performed by: FAMILY MEDICINE

## 2022-07-20 PROCEDURE — 94761 N-INVAS EAR/PLS OXIMETRY MLT: CPT

## 2022-07-20 PROCEDURE — 99291 PR CRITICAL CARE, E/M 30-74 MINUTES: ICD-10-PCS | Mod: ,,,

## 2022-07-20 PROCEDURE — 85384 FIBRINOGEN ACTIVITY: CPT | Mod: 91 | Performed by: THORACIC SURGERY (CARDIOTHORACIC VASCULAR SURGERY)

## 2022-07-20 PROCEDURE — 37799 UNLISTED PX VASCULAR SURGERY: CPT

## 2022-07-20 PROCEDURE — 94002 VENT MGMT INPAT INIT DAY: CPT

## 2022-07-20 PROCEDURE — 33508 PR ENDOSCOPY W/VIDEO-ASST VEIN HARVEST,CABG: ICD-10-PCS | Mod: 59,,, | Performed by: THORACIC SURGERY (CARDIOTHORACIC VASCULAR SURGERY)

## 2022-07-20 PROCEDURE — C1894 INTRO/SHEATH, NON-LASER: HCPCS | Performed by: THORACIC SURGERY (CARDIOTHORACIC VASCULAR SURGERY)

## 2022-07-20 PROCEDURE — 84295 ASSAY OF SERUM SODIUM: CPT

## 2022-07-20 PROCEDURE — 63600175 PHARM REV CODE 636 W HCPCS

## 2022-07-20 PROCEDURE — 84132 ASSAY OF SERUM POTASSIUM: CPT

## 2022-07-20 PROCEDURE — 63600175 PHARM REV CODE 636 W HCPCS: Performed by: NURSE ANESTHETIST, CERTIFIED REGISTERED

## 2022-07-20 PROCEDURE — 90937 PR HEMODIALYSIS, REPEATED EVAL.: ICD-10-PCS | Mod: ,,, | Performed by: INTERNAL MEDICINE

## 2022-07-20 PROCEDURE — 99291 CRITICAL CARE FIRST HOUR: CPT | Mod: 25,,, | Performed by: INTERNAL MEDICINE

## 2022-07-20 PROCEDURE — C1713 ANCHOR/SCREW BN/BN,TIS/BN: HCPCS | Performed by: THORACIC SURGERY (CARDIOTHORACIC VASCULAR SURGERY)

## 2022-07-20 PROCEDURE — 86920 COMPATIBILITY TEST SPIN: CPT | Performed by: THORACIC SURGERY (CARDIOTHORACIC VASCULAR SURGERY)

## 2022-07-20 PROCEDURE — 85025 COMPLETE CBC W/AUTO DIFF WBC: CPT | Mod: 91 | Performed by: EMERGENCY MEDICINE

## 2022-07-20 PROCEDURE — C1750 CATH, HEMODIALYSIS,LONG-TERM: HCPCS | Performed by: THORACIC SURGERY (CARDIOTHORACIC VASCULAR SURGERY)

## 2022-07-20 PROCEDURE — 90937 HEMODIALYSIS REPEATED EVAL: CPT | Mod: ,,, | Performed by: INTERNAL MEDICINE

## 2022-07-20 PROCEDURE — 37000009 HC ANESTHESIA EA ADD 15 MINS: Performed by: THORACIC SURGERY (CARDIOTHORACIC VASCULAR SURGERY)

## 2022-07-20 PROCEDURE — 82330 ASSAY OF CALCIUM: CPT

## 2022-07-20 PROCEDURE — 25000242 PHARM REV CODE 250 ALT 637 W/ HCPCS: Performed by: THORACIC SURGERY (CARDIOTHORACIC VASCULAR SURGERY)

## 2022-07-20 PROCEDURE — 86901 BLOOD TYPING SEROLOGIC RH(D): CPT | Performed by: THORACIC SURGERY (CARDIOTHORACIC VASCULAR SURGERY)

## 2022-07-20 PROCEDURE — 20000000 HC ICU ROOM

## 2022-07-20 PROCEDURE — 27201598 HC CASSETTE, BLOOD WARMER

## 2022-07-20 PROCEDURE — 36000713 HC OR TIME LEV V EA ADD 15 MIN: Performed by: THORACIC SURGERY (CARDIOTHORACIC VASCULAR SURGERY)

## 2022-07-20 PROCEDURE — 84100 ASSAY OF PHOSPHORUS: CPT | Performed by: INTERNAL MEDICINE

## 2022-07-20 PROCEDURE — 82803 BLOOD GASES ANY COMBINATION: CPT

## 2022-07-20 PROCEDURE — 33533 CABG ARTERIAL SINGLE: CPT | Mod: ,,, | Performed by: THORACIC SURGERY (CARDIOTHORACIC VASCULAR SURGERY)

## 2022-07-20 PROCEDURE — 85610 PROTHROMBIN TIME: CPT | Mod: 91 | Performed by: THORACIC SURGERY (CARDIOTHORACIC VASCULAR SURGERY)

## 2022-07-20 DEVICE — GLIDEPATH HEMODIALYSIS CATH, ST, DL 14.5 FR. 19CM
Type: IMPLANTABLE DEVICE | Site: GROIN | Status: FUNCTIONAL
Brand: GLIDEPATH LONG-TERM HEMODIALYSIS CATHETER WITH PRELOADED STYLET

## 2022-07-20 DEVICE — HEMOSTASIS OSTENE MAT 2.5GM: Type: IMPLANTABLE DEVICE | Site: HEART | Status: FUNCTIONAL

## 2022-07-20 RX ORDER — NAPROXEN SODIUM 220 MG/1
81 TABLET, FILM COATED ORAL DAILY
Status: DISCONTINUED | OUTPATIENT
Start: 2022-07-21 | End: 2022-07-27 | Stop reason: HOSPADM

## 2022-07-20 RX ORDER — HYDROCODONE BITARTRATE AND ACETAMINOPHEN 500; 5 MG/1; MG/1
TABLET ORAL
Status: DISCONTINUED | OUTPATIENT
Start: 2022-07-20 | End: 2022-07-21

## 2022-07-20 RX ORDER — HEPARIN SODIUM 10000 [USP'U]/100ML
200 INJECTION, SOLUTION INTRAVENOUS CONTINUOUS
Status: DISCONTINUED | OUTPATIENT
Start: 2022-07-20 | End: 2022-07-21

## 2022-07-20 RX ORDER — HEPARIN SODIUM 1000 [USP'U]/ML
4000 INJECTION, SOLUTION INTRAVENOUS; SUBCUTANEOUS
Status: DISCONTINUED | OUTPATIENT
Start: 2022-07-20 | End: 2022-07-27 | Stop reason: HOSPADM

## 2022-07-20 RX ORDER — CEFAZOLIN SODIUM 1 G/50ML
1 SOLUTION INTRAVENOUS
Status: COMPLETED | OUTPATIENT
Start: 2022-07-21 | End: 2022-07-21

## 2022-07-20 RX ORDER — MAGNESIUM SULFATE HEPTAHYDRATE 40 MG/ML
2 INJECTION, SOLUTION INTRAVENOUS
Status: DISCONTINUED | OUTPATIENT
Start: 2022-07-20 | End: 2022-07-20

## 2022-07-20 RX ORDER — DEXTROSE, SODIUM CHLORIDE, SODIUM LACTATE, POTASSIUM CHLORIDE, AND CALCIUM CHLORIDE 5; .6; .31; .03; .02 G/100ML; G/100ML; G/100ML; G/100ML; G/100ML
INJECTION, SOLUTION INTRAVENOUS CONTINUOUS
Status: ACTIVE | OUTPATIENT
Start: 2022-07-20 | End: 2022-07-21

## 2022-07-20 RX ORDER — METOPROLOL TARTRATE 25 MG/1
25 TABLET, FILM COATED ORAL
Status: DISCONTINUED | OUTPATIENT
Start: 2022-07-20 | End: 2022-07-20 | Stop reason: HOSPADM

## 2022-07-20 RX ORDER — FENTANYL CITRATE 50 UG/ML
INJECTION, SOLUTION INTRAMUSCULAR; INTRAVENOUS
Status: DISCONTINUED | OUTPATIENT
Start: 2022-07-20 | End: 2022-07-20 | Stop reason: HOSPADM

## 2022-07-20 RX ORDER — FENTANYL CITRATE 50 UG/ML
50 INJECTION, SOLUTION INTRAMUSCULAR; INTRAVENOUS
Status: DISCONTINUED | OUTPATIENT
Start: 2022-07-20 | End: 2022-07-25 | Stop reason: HOSPADM

## 2022-07-20 RX ORDER — CHLORHEXIDINE GLUCONATE ORAL RINSE 1.2 MG/ML
10 SOLUTION DENTAL 2 TIMES DAILY
Status: DISCONTINUED | OUTPATIENT
Start: 2022-07-20 | End: 2022-07-25

## 2022-07-20 RX ORDER — HEPARIN SODIUM 1000 [USP'U]/ML
INJECTION, SOLUTION INTRAVENOUS; SUBCUTANEOUS
Status: DISCONTINUED | OUTPATIENT
Start: 2022-07-20 | End: 2022-07-20 | Stop reason: HOSPADM

## 2022-07-20 RX ORDER — PAPAVERINE HYDROCHLORIDE 30 MG/ML
INJECTION INTRAMUSCULAR; INTRAVENOUS
Status: DISCONTINUED | OUTPATIENT
Start: 2022-07-20 | End: 2022-07-20 | Stop reason: HOSPADM

## 2022-07-20 RX ORDER — CEFAZOLIN SODIUM 1 G/3ML
INJECTION, POWDER, FOR SOLUTION INTRAMUSCULAR; INTRAVENOUS
Status: DISCONTINUED | OUTPATIENT
Start: 2022-07-20 | End: 2022-07-20 | Stop reason: HOSPADM

## 2022-07-20 RX ORDER — FOLIC ACID 1 MG/1
1 TABLET ORAL DAILY
Status: DISCONTINUED | OUTPATIENT
Start: 2022-07-22 | End: 2022-07-27 | Stop reason: HOSPADM

## 2022-07-20 RX ORDER — CALCIUM CHLORIDE INJECTION 100 MG/ML
INJECTION, SOLUTION INTRAVENOUS
Status: DISCONTINUED | OUTPATIENT
Start: 2022-07-20 | End: 2022-07-20 | Stop reason: HOSPADM

## 2022-07-20 RX ORDER — CYANOCOBALAMIN (VITAMIN B-12) 250 MCG
1000 TABLET ORAL DAILY
Status: DISCONTINUED | OUTPATIENT
Start: 2022-07-22 | End: 2022-07-27 | Stop reason: HOSPADM

## 2022-07-20 RX ORDER — ASCORBIC ACID 500 MG
500 TABLET ORAL DAILY
Status: DISCONTINUED | OUTPATIENT
Start: 2022-07-21 | End: 2022-07-27 | Stop reason: HOSPADM

## 2022-07-20 RX ORDER — CALCIUM GLUCONATE 20 MG/ML
1 INJECTION, SOLUTION INTRAVENOUS ONCE
Status: DISCONTINUED | OUTPATIENT
Start: 2022-07-20 | End: 2022-07-27 | Stop reason: HOSPADM

## 2022-07-20 RX ORDER — AMINOCAPROIC ACID 250 MG/ML
INJECTION, SOLUTION INTRAVENOUS
Status: DISCONTINUED | OUTPATIENT
Start: 2022-07-20 | End: 2022-07-20 | Stop reason: HOSPADM

## 2022-07-20 RX ORDER — FAMOTIDINE 20 MG/1
20 TABLET, FILM COATED ORAL DAILY
Status: DISCONTINUED | OUTPATIENT
Start: 2022-07-21 | End: 2022-07-27 | Stop reason: HOSPADM

## 2022-07-20 RX ORDER — SODIUM CHLORIDE, SODIUM LACTATE, POTASSIUM CHLORIDE, CALCIUM CHLORIDE 600; 310; 30; 20 MG/100ML; MG/100ML; MG/100ML; MG/100ML
500 INJECTION, SOLUTION INTRAVENOUS
Status: DISCONTINUED | OUTPATIENT
Start: 2022-07-20 | End: 2022-07-27 | Stop reason: HOSPADM

## 2022-07-20 RX ORDER — ROCURONIUM BROMIDE 10 MG/ML
INJECTION, SOLUTION INTRAVENOUS
Status: DISCONTINUED | OUTPATIENT
Start: 2022-07-20 | End: 2022-07-20 | Stop reason: HOSPADM

## 2022-07-20 RX ORDER — DEXMEDETOMIDINE HYDROCHLORIDE 4 UG/ML
0-1.4 INJECTION INTRAVENOUS CONTINUOUS
Status: DISCONTINUED | OUTPATIENT
Start: 2022-07-20 | End: 2022-07-23

## 2022-07-20 RX ORDER — ACETAMINOPHEN 650 MG/20.3ML
650 LIQUID ORAL EVERY 6 HOURS PRN
Status: DISCONTINUED | OUTPATIENT
Start: 2022-07-20 | End: 2022-07-25

## 2022-07-20 RX ORDER — MAGNESIUM SULFATE HEPTAHYDRATE 40 MG/ML
2 INJECTION, SOLUTION INTRAVENOUS
Status: DISCONTINUED | OUTPATIENT
Start: 2022-07-20 | End: 2022-07-21

## 2022-07-20 RX ORDER — POTASSIUM CHLORIDE 20 MEQ/1
20 TABLET, EXTENDED RELEASE ORAL EVERY 12 HOURS
Status: DISCONTINUED | OUTPATIENT
Start: 2022-07-21 | End: 2022-07-20

## 2022-07-20 RX ORDER — MIDAZOLAM HYDROCHLORIDE 1 MG/ML
INJECTION INTRAMUSCULAR; INTRAVENOUS
Status: DISCONTINUED | OUTPATIENT
Start: 2022-07-20 | End: 2022-07-20 | Stop reason: HOSPADM

## 2022-07-20 RX ORDER — LIDOCAINE HCL/PF 100 MG/5ML
SYRINGE (ML) INTRAVENOUS
Status: DISCONTINUED | OUTPATIENT
Start: 2022-07-20 | End: 2022-07-20 | Stop reason: HOSPADM

## 2022-07-20 RX ORDER — NOREPINEPHRINE BITARTRATE 1 MG/ML
INJECTION, SOLUTION INTRAVENOUS
Status: DISCONTINUED | OUTPATIENT
Start: 2022-07-20 | End: 2022-07-20 | Stop reason: HOSPADM

## 2022-07-20 RX ORDER — FUROSEMIDE 10 MG/ML
40 INJECTION INTRAMUSCULAR; INTRAVENOUS 2 TIMES DAILY
Status: DISCONTINUED | OUTPATIENT
Start: 2022-07-21 | End: 2022-07-20

## 2022-07-20 RX ORDER — NEOMYCIN AND POLYMYXIN B SULFATES 40; 200000 MG/ML; [USP'U]/ML
SOLUTION IRRIGATION
Status: DISCONTINUED | OUTPATIENT
Start: 2022-07-20 | End: 2022-07-20 | Stop reason: HOSPADM

## 2022-07-20 RX ORDER — MAGNESIUM SULFATE HEPTAHYDRATE 40 MG/ML
4 INJECTION, SOLUTION INTRAVENOUS
Status: DISCONTINUED | OUTPATIENT
Start: 2022-07-20 | End: 2022-07-20

## 2022-07-20 RX ORDER — CLOPIDOGREL BISULFATE 75 MG/1
75 TABLET ORAL DAILY
Status: DISCONTINUED | OUTPATIENT
Start: 2022-07-21 | End: 2022-07-22

## 2022-07-20 RX ORDER — METOPROLOL TARTRATE 25 MG/1
25 TABLET, FILM COATED ORAL 2 TIMES DAILY
Status: DISCONTINUED | OUTPATIENT
Start: 2022-07-21 | End: 2022-07-27 | Stop reason: HOSPADM

## 2022-07-20 RX ORDER — LIDOCAINE HYDROCHLORIDE ANHYDROUS AND DEXTROSE MONOHYDRATE .8; 5 G/100ML; G/100ML
INJECTION, SOLUTION INTRAVENOUS CONTINUOUS PRN
Status: DISCONTINUED | OUTPATIENT
Start: 2022-07-20 | End: 2022-07-20 | Stop reason: HOSPADM

## 2022-07-20 RX ORDER — ADHESIVE BANDAGE
30 BANDAGE TOPICAL DAILY
Status: DISCONTINUED | OUTPATIENT
Start: 2022-07-21 | End: 2022-07-27 | Stop reason: HOSPADM

## 2022-07-20 RX ORDER — POLYETHYLENE GLYCOL 3350 17 G/17G
17 POWDER, FOR SOLUTION ORAL DAILY
Status: DISCONTINUED | OUTPATIENT
Start: 2022-07-21 | End: 2022-07-23

## 2022-07-20 RX ORDER — VANCOMYCIN HYDROCHLORIDE 1 G/20ML
INJECTION, POWDER, LYOPHILIZED, FOR SOLUTION INTRAVENOUS
Status: DISCONTINUED | OUTPATIENT
Start: 2022-07-20 | End: 2022-07-20 | Stop reason: HOSPADM

## 2022-07-20 RX ORDER — CALCIUM GLUCONATE 20 MG/ML
1 INJECTION, SOLUTION INTRAVENOUS
Status: DISCONTINUED | OUTPATIENT
Start: 2022-07-20 | End: 2022-07-20

## 2022-07-20 RX ORDER — SODIUM CHLORIDE, SODIUM LACTATE, POTASSIUM CHLORIDE, CALCIUM CHLORIDE 600; 310; 30; 20 MG/100ML; MG/100ML; MG/100ML; MG/100ML
INJECTION, SOLUTION INTRAVENOUS CONTINUOUS PRN
Status: DISCONTINUED | OUTPATIENT
Start: 2022-07-20 | End: 2022-07-20 | Stop reason: HOSPADM

## 2022-07-20 RX ORDER — NICARDIPINE HYDROCHLORIDE 0.2 MG/ML
0-15 INJECTION INTRAVENOUS CONTINUOUS
Status: DISCONTINUED | OUTPATIENT
Start: 2022-07-20 | End: 2022-07-23

## 2022-07-20 RX ORDER — ALBUMIN HUMAN 50 G/1000ML
25 SOLUTION INTRAVENOUS
Status: DISCONTINUED | OUTPATIENT
Start: 2022-07-20 | End: 2022-07-27 | Stop reason: HOSPADM

## 2022-07-20 RX ORDER — HYDROCODONE BITARTRATE AND ACETAMINOPHEN 500; 5 MG/1; MG/1
TABLET ORAL CONTINUOUS
Status: DISCONTINUED | OUTPATIENT
Start: 2022-07-20 | End: 2022-07-21

## 2022-07-20 RX ORDER — CALCIUM GLUCONATE 98 MG/ML
INJECTION, SOLUTION INTRAVENOUS
Status: COMPLETED
Start: 2022-07-20 | End: 2022-07-20

## 2022-07-20 RX ORDER — MILRINONE LACTATE 0.2 MG/ML
0.38 INJECTION, SOLUTION INTRAVENOUS CONTINUOUS
Status: DISCONTINUED | OUTPATIENT
Start: 2022-07-20 | End: 2022-07-23

## 2022-07-20 RX ORDER — IPRATROPIUM BROMIDE AND ALBUTEROL SULFATE 2.5; .5 MG/3ML; MG/3ML
3 SOLUTION RESPIRATORY (INHALATION) EVERY 4 HOURS
Status: COMPLETED | OUTPATIENT
Start: 2022-07-20 | End: 2022-07-21

## 2022-07-20 RX ORDER — PROTAMINE SULFATE 10 MG/ML
INJECTION, SOLUTION INTRAVENOUS
Status: DISCONTINUED | OUTPATIENT
Start: 2022-07-20 | End: 2022-07-20 | Stop reason: HOSPADM

## 2022-07-20 RX ORDER — HYDROCODONE BITARTRATE AND ACETAMINOPHEN 500; 5 MG/1; MG/1
TABLET ORAL
Status: DISCONTINUED | OUTPATIENT
Start: 2022-07-21 | End: 2022-07-21

## 2022-07-20 RX ORDER — PROPOFOL 10 MG/ML
VIAL (ML) INTRAVENOUS
Status: DISCONTINUED | OUTPATIENT
Start: 2022-07-20 | End: 2022-07-20 | Stop reason: HOSPADM

## 2022-07-20 RX ORDER — ONDANSETRON 2 MG/ML
4 INJECTION INTRAMUSCULAR; INTRAVENOUS EVERY 12 HOURS PRN
Status: DISCONTINUED | OUTPATIENT
Start: 2022-07-20 | End: 2022-07-27 | Stop reason: HOSPADM

## 2022-07-20 RX ORDER — VANCOMYCIN HCL IN 5 % DEXTROSE 1G/250ML
1000 PLASTIC BAG, INJECTION (ML) INTRAVENOUS
Status: COMPLETED | OUTPATIENT
Start: 2022-07-21 | End: 2022-07-21

## 2022-07-20 RX ORDER — BUPIVACAINE HYDROCHLORIDE 2.5 MG/ML
INJECTION, SOLUTION EPIDURAL; INFILTRATION; INTRACAUDAL
Status: DISCONTINUED | OUTPATIENT
Start: 2022-07-20 | End: 2022-07-20 | Stop reason: HOSPADM

## 2022-07-20 RX ORDER — HYDROCODONE BITARTRATE AND ACETAMINOPHEN 500; 5 MG/1; MG/1
TABLET ORAL CONTINUOUS
Status: DISCONTINUED | OUTPATIENT
Start: 2022-07-20 | End: 2022-07-20

## 2022-07-20 RX ADMIN — HEPARIN SODIUM 12000 UNITS: 1000 INJECTION, SOLUTION INTRAVENOUS; SUBCUTANEOUS at 09:07

## 2022-07-20 RX ADMIN — DEXMEDETOMIDINE HYDROCHLORIDE 0.8 MCG/KG/HR: 4 INJECTION INTRAVENOUS at 02:07

## 2022-07-20 RX ADMIN — PROPOFOL 60 MG: 10 INJECTION, EMULSION INTRAVENOUS at 07:07

## 2022-07-20 RX ADMIN — NOREPINEPHRINE BITARTRATE 0.02 MG: 1 INJECTION INTRAVENOUS at 09:07

## 2022-07-20 RX ADMIN — SODIUM CHLORIDE 26 UNITS/HR: 9 INJECTION, SOLUTION INTRAVENOUS at 11:07

## 2022-07-20 RX ADMIN — FENTANYL CITRATE 50 MCG: 50 INJECTION, SOLUTION INTRAMUSCULAR; INTRAVENOUS at 07:07

## 2022-07-20 RX ADMIN — FENTANYL CITRATE 50 MCG: 50 INJECTION INTRAMUSCULAR; INTRAVENOUS at 05:07

## 2022-07-20 RX ADMIN — MIDAZOLAM HYDROCHLORIDE 2 MG: 1 INJECTION, SOLUTION INTRAMUSCULAR; INTRAVENOUS at 07:07

## 2022-07-20 RX ADMIN — NOREPINEPHRINE BITARTRATE 0.02 MG: 1 INJECTION INTRAVENOUS at 08:07

## 2022-07-20 RX ADMIN — LIDOCAINE HYDROCHLORIDE 80 MG: 20 INJECTION, SOLUTION INTRAVENOUS at 07:07

## 2022-07-20 RX ADMIN — HEPARIN SODIUM 5000 UNITS: 1000 INJECTION, SOLUTION INTRAVENOUS; SUBCUTANEOUS at 09:07

## 2022-07-20 RX ADMIN — ROCURONIUM BROMIDE 50 MG: 10 INJECTION, SOLUTION INTRAVENOUS at 09:07

## 2022-07-20 RX ADMIN — VASOPRESSIN 0.02 UNITS/MIN: 20 INJECTION INTRAVENOUS at 09:07

## 2022-07-20 RX ADMIN — HEPARIN SODIUM 200 UNITS/HR: 10000 INJECTION, SOLUTION INTRAVENOUS at 05:07

## 2022-07-20 RX ADMIN — CHLORHEXIDINE GLUCONATE 0.12% ORAL RINSE 10 ML: 1.2 LIQUID ORAL at 06:07

## 2022-07-20 RX ADMIN — ROCURONIUM BROMIDE 30 MG: 10 INJECTION, SOLUTION INTRAVENOUS at 10:07

## 2022-07-20 RX ADMIN — ROCURONIUM BROMIDE 50 MG: 10 INJECTION, SOLUTION INTRAVENOUS at 07:07

## 2022-07-20 RX ADMIN — METOPROLOL TARTRATE 25 MG: 25 TABLET, FILM COATED ORAL at 05:07

## 2022-07-20 RX ADMIN — EPINEPHRINE 0.05 MCG/KG/MIN: 1 INJECTION PARENTERAL at 11:07

## 2022-07-20 RX ADMIN — FENTANYL CITRATE 50 MCG: 50 INJECTION INTRAMUSCULAR; INTRAVENOUS at 07:07

## 2022-07-20 RX ADMIN — CALCIUM GLUCONATE: 98 INJECTION, SOLUTION INTRAVENOUS at 10:07

## 2022-07-20 RX ADMIN — DEXMEDETOMIDINE HYDROCHLORIDE 1 MCG/KG/HR: 4 INJECTION INTRAVENOUS at 05:07

## 2022-07-20 RX ADMIN — HEPARIN SODIUM 9000 UNITS: 1000 INJECTION, SOLUTION INTRAVENOUS; SUBCUTANEOUS at 09:07

## 2022-07-20 RX ADMIN — AMIODARONE HYDROCHLORIDE 200 MG: 200 TABLET ORAL at 05:07

## 2022-07-20 RX ADMIN — VASOPRESSIN 0.04 UNITS/MIN: 20 INJECTION INTRAVENOUS at 05:07

## 2022-07-20 RX ADMIN — CEFAZOLIN 2 G: 1 INJECTION, POWDER, FOR SOLUTION INTRAMUSCULAR; INTRAVENOUS at 08:07

## 2022-07-20 RX ADMIN — ALBUMIN (HUMAN) 25 G: 2.5 SOLUTION INTRAVENOUS at 02:07

## 2022-07-20 RX ADMIN — SODIUM CHLORIDE, SODIUM LACTATE, POTASSIUM CHLORIDE, AND CALCIUM CHLORIDE: .6; .31; .03; .02 INJECTION, SOLUTION INTRAVENOUS at 07:07

## 2022-07-20 RX ADMIN — CALCIUM CHLORIDE 1000 MG: 100 INJECTION INTRAVENOUS; INTRAVENTRICULAR at 11:07

## 2022-07-20 RX ADMIN — MAGNESIUM SULFATE HEPTAHYDRATE 2 G: 40 INJECTION, SOLUTION INTRAVENOUS at 07:07

## 2022-07-20 RX ADMIN — PROTAMINE SULFATE 300 MG: 10 INJECTION, SOLUTION INTRAVENOUS at 12:07

## 2022-07-20 RX ADMIN — EPINEPHRINE 0.14 MCG/KG/MIN: 1 INJECTION INTRAMUSCULAR; INTRAVENOUS; SUBCUTANEOUS at 02:07

## 2022-07-20 RX ADMIN — AMINOCAPROIC ACID 5 G: 250 INJECTION, SOLUTION INTRAVENOUS at 12:07

## 2022-07-20 RX ADMIN — SODIUM CHLORIDE 4 UNITS/HR: 9 INJECTION, SOLUTION INTRAVENOUS at 03:07

## 2022-07-20 RX ADMIN — MILRINONE LACTATE IN DEXTROSE 0.38 MCG/KG/MIN: 200 INJECTION, SOLUTION INTRAVENOUS at 10:07

## 2022-07-20 RX ADMIN — VANCOMYCIN HYDROCHLORIDE 1000 MG: 1 INJECTION, POWDER, LYOPHILIZED, FOR SOLUTION INTRAVENOUS at 08:07

## 2022-07-20 RX ADMIN — ALBUMIN (HUMAN) 25 G: 2.5 SOLUTION INTRAVENOUS at 07:07

## 2022-07-20 RX ADMIN — DEXTROSE, SODIUM CHLORIDE, SODIUM LACTATE, POTASSIUM CHLORIDE, AND CALCIUM CHLORIDE: 5; .6; .31; .03; .02 INJECTION, SOLUTION INTRAVENOUS at 02:07

## 2022-07-20 RX ADMIN — CEFAZOLIN 2 G: 1 INJECTION, POWDER, FOR SOLUTION INTRAMUSCULAR; INTRAVENOUS at 12:07

## 2022-07-20 RX ADMIN — MILRINONE LACTATE IN DEXTROSE 0.38 MCG/KG/MIN: 200 INJECTION, SOLUTION INTRAVENOUS at 12:07

## 2022-07-20 RX ADMIN — AMINOCAPROIC ACID 5 G: 250 INJECTION, SOLUTION INTRAVENOUS at 08:07

## 2022-07-20 RX ADMIN — IPRATROPIUM BROMIDE AND ALBUTEROL SULFATE 3 ML: 2.5; .5 SOLUTION RESPIRATORY (INHALATION) at 07:07

## 2022-07-20 RX ADMIN — SODIUM CHLORIDE 0.8 MCG/KG/HR: 9 INJECTION INTRAMUSCULAR; INTRAVENOUS; SUBCUTANEOUS at 12:07

## 2022-07-20 RX ADMIN — LIDOCAINE HYDROCHLORIDE 0.04 MG/KG/MIN: 8 INJECTION, SOLUTION INTRAVENOUS at 08:07

## 2022-07-20 RX ADMIN — IPRATROPIUM BROMIDE AND ALBUTEROL SULFATE 3 ML: 2.5; .5 SOLUTION RESPIRATORY (INHALATION) at 03:07

## 2022-07-20 RX ADMIN — ALBUMIN (HUMAN) 25 G: 2.5 SOLUTION INTRAVENOUS at 04:07

## 2022-07-20 RX ADMIN — EPINEPHRINE 0.23 MCG/KG/MIN: 1 INJECTION INTRAMUSCULAR; INTRAVENOUS; SUBCUTANEOUS at 07:07

## 2022-07-20 RX ADMIN — SODIUM CHLORIDE 2 UNITS/HR: 9 INJECTION, SOLUTION INTRAVENOUS at 09:07

## 2022-07-20 NOTE — ANESTHESIA PROCEDURE NOTES
Monitor EUNICE    Diagnosis: CABG  Patient location during procedure: OR  Procedure start time: 7/20/2022 7:25 AM    Staffing  Authorizing Provider: Noman Lamb II, MD  Performing Provider: Noman Lamb II, MD    Staffing  Anesthesiologist Present  Yes  Setup & Induction  Patient preparation: bite block inserted  Probe Insertion: easy      Findings    Moderate mitral regurgitation.  Decent ventricular contractility.  Estimated somewhat lower ejection fraction.  No wall motion abnormalities.  Mild Tricuspid regurgitation.  Impression    Other Findings    Probe Removal

## 2022-07-20 NOTE — PROGRESS NOTES
[facility] - Intensive Care ([facility])  [facility] Medicine  Progress Note    Patient Name: Avril Monzon  MRN: [id number]  Patient Class: IP- Inpatient   Admission Date: 7/12/2022  Length of Stay: 8 days  Attending Physician: Sanju Locke MD  Primary Care Provider: Rose Parks MD        Subjective:     Principal Problem:NSTEMI (non-ST elevated myocardial infarction)        HPI:  77 y/o female with a pmh of ESRD on HD, DM2, hyperlipidemia,CAD , Tobacco abuse , HTN   and h/o CVA who was brought to ED by family today due to chest pain  for the last 4 days which has gotten worse .  It is located  in the midsternum , intermittent ,  Pressure like pain ,  no radiation , rated 6/10  associated sx son  and nausea . She was in her normal health of states before Friday . She denies  any  fever , chills , diarrhea , Cough , HA  or  sx . She report missed HD yesterday due to  not feeling well . She went to Urgent care  yesterday , was dx with sinus infection  and d/c home .  ER COURSE:  K 6.1 , Co2 12 , Anion gap 23 , BUN/Cr 78/7.1 ,  , Troponin 12 , BHB 1.8 . CXR show pulmonary congestion . ABG show a PH 7.297   Cardiology and Nephrology consulted from the  ER . Started on insulin and heparin drip   ER VS:   BP Pulse Resp Temp SpO2   106/76 72 16 98 °F (36.7 °C) 96 %   CODE Status : Full code  Pt will be admitted to inpt with a dx of NSTEMI and DKA      Overview/Hospital Course:  76 y/o AAF admitted to ICU with a dx of DKA and NSTEMI . Started on Insulin drio , heparin drip, plavix , asa , BB and statin.  [facility] Cardiology consulted and performed a LHC which show a multivessel ds .  CVT consulted and plan for CABG Monday. As of 7/14/22 patient seen and examined in room, lying in bed. HD in progress. Denies any cardiac complaints today. Awaiting CABG on Monday.   7/15 awaiting cabg on Monday per CTS. Denies chest pain, dyspnea. On hd MWF  7/16 awaiting cabg on Monday  per CTS. Indigestion persists, characterized as tightness upper abdomen after eating, modestly improved with famotidine. Pmh gerd. Consider adding proton pump inhibitor. Denies chest pain, dyspnea.   7/17 tolerating dialysis this am. Naeon. Indigestion improved. Denies chest pain, dyspnea, fever. Cabg tomorrow. Npo after mn  7/18 cabg canceled today due to equipment shortage. Otherwise no complaints.  7/19 NAEON. Awaiting cabg tomorrow  7/20 status post cabg. Transferred to icu intubated, sedated.       Interval History: See hospital course for today      Review of Systems   Unable to perform ROS: Intubated   Objective:     Vital Signs (Most Recent):  Temp: 98 °F (36.7 °C) (07/20/22 0432)  Pulse: 66 (07/20/22 0432)  Resp: 17 (07/20/22 0432)  BP: (!) 140/62 (07/20/22 0432)  SpO2: 96 % (07/20/22 0432)   Vital Signs (24h Range):  Temp:  [97.4 °F (36.3 °C)-98.1 °F (36.7 °C)] 98 °F (36.7 °C)  Pulse:  [60-78] 66  Resp:  [17-19] 17  SpO2:  [96 %-99 %] 96 %  BP: (131-150)/(56-63) 140/62     Weight: 61.9 kg (136 lb 7.4 oz)  Body mass index is 22.71 kg/m².    Intake/Output Summary (Last 24 hours) at 7/20/2022 1430  Last data filed at 7/20/2022 1240  Gross per 24 hour   Intake 1150 ml   Output 3010 ml   Net -1860 ml      Physical Exam  Vitals and nursing note reviewed. Exam conducted with a chaperone present (icu).   Constitutional:       General: She is not in acute distress.     Appearance: She is underweight. She is ill-appearing. She is not toxic-appearing.      Interventions: She is sedated and intubated.   HENT:      Head: Normocephalic and atraumatic.   Cardiovascular:      Rate and Rhythm: Normal rate.   Pulmonary:      Effort: She is intubated.      Comments: Mechanical breath sounds  Chest:      Comments: Chest tubes  Abdominal:      Palpations: Abdomen is soft.   Genitourinary:     Comments: arzate  Musculoskeletal:      Comments: Rishabh drain containing sanguinous fluid, left leg, ace wrapped   Skin:     General: Skin  is warm.       Significant Labs: All pertinent labs within the past 24 hours have been reviewed.  CBC:   Recent Labs   Lab 07/19/22  0736 07/20/22  0556   WBC 5.44 4.97   HGB 11.0* 11.1*   HCT 33.8* 33.5*    259     CMP:   Recent Labs   Lab 07/19/22  0555 07/19/22  2127 07/20/22  0556   * 137 134*   K 4.8 3.9 4.4   CL 96 99 99   CO2 17* 23 25   * 225* 194*   BUN 61* 24* 30*   CREATININE 8.4* 5.1* 5.8*   CALCIUM 8.7 9.0 8.7   PROT  --  6.3  --    ALBUMIN  --  3.2*  --    BILITOT  --  0.6  --    ALKPHOS  --  92  --    AST  --  15  --    ALT  --  17  --    ANIONGAP 15 15 10   EGFRNONAA 4* 8* 7*       Significant Imaging: I have reviewed all pertinent imaging results/findings within the past 24 hours.      Assessment/Plan:      * NSTEMI (non-ST elevated myocardial infarction)  Cardiology consulted  Cont Heparin drip , asa , plavix and BB  TTE  Trend troponin level   S/P LHC which multiple vessel ds   CVT consulted     Plan for CABG   [facility] cab[facility]    Metabolic acidosis  Cont HD        CAD (coronary artery disease)  Cont asa , bb and statin  S/P LHC which show multiple vessel ds   CVT consulted . Plan for CABG pending tomorrow    7/20  Status post cabg      ESRD (end stage renal disease)  Nephrology consulted for mwf maintenance   HD per nephrology   Off schedule for now due to impending surgery  Received 1 unit of blood post dialysis  Per nephrology, recommend transfusion during dialysis if possible  Hb/hct stable     CTS requesting dialysis today   Nephrology aware and following    Hypertension associated with diabetes  Controlled to mildly elevated blood pressure  Uncontrolled diabetes mellitus   Cont beta blocker  Monitor after hd   Avoid hypoglycemia pending cabg    7/20  Status post cabg  Per primary          VTE Risk Mitigation (From admission, onward)         Ordered     Place sequential compression device  Until discontinued         07/19/22 1700     Place sequential  compression device  Until discontinued         07/17/22 2118     Place DYANA hose  Until discontinued         07/12/22 1637     Place sequential compression device  Until discontinued         07/12/22 1637                Discharge Planning   MARIBELL:      Code Status: Full Code   Is the patient medically ready for discharge?:     Reason for patient still in hospital (select all that apply): Patient new problem, Patient trending condition, Laboratory test, Treatment, Consult recommendations and Pending disposition  Discharge Plan A: Home with family            Critical care time spent on the evaluation and treatment of severe organ dysfunction, review of pertinent labs and imaging studies, discussions with consulting providers and discussions with patient/family: 20 minutes.    Thank you for allowing me to participate in the care of your patient. I will sign off.       Facundo Dunlap MD  Department of Hospital Medicine   '[facility] - Intensive [facility] ([facility])

## 2022-07-20 NOTE — OP NOTE
[facility] - Intensive Care ([facility])  Cardiothoracic Surgery  Operative Note    SUMMARY     Date of Procedure: 7/20/2022     Procedure: Procedure(s) (LRB):  CORONARY ARTERY BYPASS GRAFT (CABG) (N/A)  ECHOCARDIOGRAM,TRANSESOPHAGEAL (N/A)  BLOCK, NERVE, INTERCOSTAL, 2 OR MORE (N/A)  SURGICAL PROCUREMENT, VEIN, ENDOSCOPIC (Left)  INSERTION, VASCULAR ACCESS CATHETER (N/A)    Surgeon(s) and Role:     * Sanju Locke MD - Primary    Assisting Surgeon: None    Pre-Operative Diagnosis: NSTEMI (non-ST elevated myocardial infarction) [I21.4]  InStent restenoses  Hypertension  Hyperlipidemia  Severe COPD  End-stage renal disease on dialysis  Type 2 diabetes mellitus    Post-Operative Diagnosis: Post-Op Diagnosis Codes:     * NSTEMI (non-ST elevated myocardial infarction) [I21.4]  Same  Anesthesia: General/    Operative Findings (including complications, if any):  Severely calcified porcelain aorta  Two soft areas identified for 1 aortic cannulation other 1 for the proximal anastomosis  Ejection fraction 35-40% global hypokinesia  Moderate mitral regurgitation  Severe pulmonary hypertension  Mild-to-moderate tricuspid regurgitation  Calcified aorta  Description of Technical Procedures:   [facility]  Cardiothoracic Surgery  Operative Note      DATE OF PROCEDURE: 07/20/2022    ATTENDING SURGEON:  Sanju Locke M.D.        ANESTHESIOLOGIST:  Noman BRYNAT      Procedure(s):  CORONARY ARTERY BYPASS GRAFT (CABG) pump assisted beating beating heart  ECHOCARDIOGRAM,TRANSESOPHAGEAL  BLOCK, NERVE, INTERCOSTAL, 2 OR MORE  SURGICAL PROCUREMENT, VEIN, ENDOSCOPIC  INSERTION, VASCULAR ACCESS CATHETER      PROCEDURAL SUMMARY:   Coronary artery bypass graft, x 2  1) Pedicled LIMA to LAD  2) Saphenous vein graft to obtuse marginal   Endoscopic vein harvesting from the left leg   Multiple intercostal nerve blocks with 0.25% Marcaine   Transesophageal echocardiogram findings:   1) Estimated ejection fraction 35-40  %  Moderate MR persisted severe pulmonary hypertension persisted  Right ventricular size failure which was treated with Milrinone and nitric oxide patient responded well   ANESTHESIA:  General endotracheal    PATIENT POSITION: Supine     ESTIMATED BLOOD LOSS:  500  mL     DESCRIPTION OF OPERATIVE FINDINGS AND OUTCOME: Successful coronary revascularization without apparent complications    IMPLANTS:   Implant Name Type Inv. Item Serial No.  Lot No. LRB No. Used Action   MARJAN-THRU INTRALUMINAL SHUNT 1.25MM   N/A  [id number] N/A 1 Implanted and Explanted   SHUNT MARJAN THRU 2.0X18 - SN/A  SHUNT MARJAN THRU 2.0X18 N/A [employer] [id number] N/A 1 Implanted and Explanted   EDGAR RADIOMARK GRAFT MARKER   N/A  [id number] N/A 1 Implanted   HEMOSTASIS OSTENE MAT 2.5GM - SN/A  HEMOSTASIS OSTENE MAT 2.5GM N/A [employer] [facility] [id number] N/A 3 Implanted   Sternal Zipfix with Needle Sterile / 5 pack    N/A  [id number] N/A 1 Implanted   CATH GLIDEPATH 14.5F 19CM 24CM - SN/A Dialysis Catheter CATH GLIDEPATH 14.5F 19CM 24CM N/A C.R. [employer] [id number] Right 1 Implanted       SPECIMENS:   Specimen (24h ago, onward)            None           DISPOSITION: ICU - intubated and critically ill.    ATTESTATION: I was present and scrubbed for the entire procedure.       BRIEF HISTORY:  Avril Monzon is a 77 y.o. female that presented urgently and was found to have NSTEMI. A cardiac cath was done and the patient was found to have 3 vessel disease.  The patients echocardiogram showed  ejection fraction 40% global hypokinesis the a. The carotid ultrasound showed insignificant coronary artery. Cardiac risk factors include HTN, HLD, Diabetes mellitus, Tobacco use, PAD, Kidney disease, LILIANA, FHx of early heart disease and Heart failure. The patient's NYHA Functional Classification score is NYHA I: cardiac disease, but without resulting limitations of physical activity.   The patient was consented for coronary artery bypass  grafting after explaining the risks (ncluding but not limited to stroke, bleeding, kidney injury, and death), benefits and alternatives. All the patients questions were answered and the patient agrees with the plan to proceed with surgery.  Since the patient had a CT scan which showed the patient had a porcelain aorta it was planned that she will need a off pump coronary artery meeting heart surgery and it was planned earlier to do it which will be high risk for her.     PROCEDURE IN DETAIL:  The patient was brought to the operating room and placed on the operating room table in the supine position. A arzate catheter placed by the surgical team. After adequate induction of general endotracheal anesthesia and placement of an arterial line, a central venous line, and a Hollsopple-Grisel catheter by the anesthesia team, the patient's chest, abdomen, pelvis, and bilateral lower extremities were prepped and draped in the usual sterile fashion.  Surgical time-out was then done.     Skin incisions were made over the left lower extremity. The saphenous vein was harvested using an endoscopic technique, and the vein was checked for its quality. Once the vein was harvested, the leg was closed in multiple layers of absorbable suture. A THIERRY drain was left in the leg, and then the leg was wrapped and dressed. The vein was kept in a vein preservation solution.    A standard median sternotomy was performed. Hemostasis was achieved to the sternal edges. Rultract retractor was placed. The left internal mammary artery was dissected as a pedicle with Bovie electrocautery. A pleural drain was placed. Full heparin was given, and the ACT was checked to ensure that the level was greater than 500 seconds. The left internal mammary artery was then divided and checked to ensure that it had good flow. The pedicle was sprayed with dilute papaverine.    A chest retractor was placed. Next, the thymic remnants were divided and the pericardium was opened  along the midline. A pericardial well was then created by placing stay sutures.  At this time on palpation of the aorta aorta is found to be severely calcified and there was too soft area as 1 of the ascending aorta and other 1 is on the lesser curvature of the ascending aorta which was palpated with finger severe pulses posterior calcification was identified throughout the aorta arch vessels also had calcifications.     Cannulation sutures were placed first in the aorta and then in the right atrial appendage. The aortic cannula was inserted, followed by the venous cannula.   Cardiopulmonary bypass was then begun.    We first addressed the anastomosis between the Saphenous vein graft to obtuse marginal.  We retracted the heart and the patient had severe mitral regurgitation heavy use octopus stabilizer to stabilize the heart he placed 2 retractile sutures and used a 1.5 mm shunt.  We palpated the artery and there was a soft spot in the mid segment  A site suitable for grafting on the heart was first located, and an arteriotomy was then made.  We then placed a 1.5 mm shunt and snared the retractile tapes The graft was then brought up into the surgical field, and its end was spatulated. The distal end to side anastomosis was performed using a running 7-0 Prolene suture.  After completion of the anastomosis, it was tested to ensure hemostasis and suitable flow.    Next, we turned our attention to the anterior wall of the heart.  On the beating part we had did extensive dissection since the LAD was intra myocardial except on the proximal as well as near the apex we then identified a spot on the mid segment past the calcification and takeoff of the 1st diagonal We identified a site along the Mid LAD that was suitable for grafting. An arteriotomy was made, and the internal mammary artery was checked to ensure appropriate length and suitable flow. The end-to-side anastomosis was performed using a running 7-0 Prolene  suture.  We placed octopus stabilizer and use retractile tape we had to use 2 mm shunt for this anastomosis  After completion, it was tested and found to be hemostatic. The mammary pedicle was tacked to the heart using two 6-0 Prolene sutures.      Through the proximal anastomosis we used heartstring without the cross-clamping or side-biting the aorta a soft spot which was predeterminedd earlier was chosen on the lesser curvature  The grafts were checked to ensure that there was no kinking or torsion, and then they were cut to an appropriate length. Their ends were spatulated, and a running 6 0 Prolene suture was used to complete the proximal end to side anastomoses.   we removed the heartstring device after cutting the pleural Prolene suture and pulled it. The grafts were de-aired before reestablishing the blood flow. The patient was rewarmed to 37° centigrade  The patient was subsequently weaned from cardiopulmonary bypass.  Initially once sedated the patient was coming off from bypass we had an episode of the PA pressures going up to 90 mmHg which was supra systemic at that point and we had to go back on bypass at this time it was decided to start the patient on Primacor epinephrine and also nitric oxide was used to assist the weaning process.  Second time the patient came on without any problem  . Once off bypass, all surgical sites were inspected to ensure suitable hemostasis. A test dose of protamine was administered, and this was well tolerated. The total dose was then given. nursing home through the total dose, all cannulas were removed. All surgical sites were again inspected and checked for good hemostasis. Ventricular pacing wires were placed.  Two atrial pacing wires were also placed Mediastinal drains were placed. After checking for adequate hemostasis again, the patient's chest was closed using 3 #6 stainless steel wires in a simple, interrupted fashion to close the manubrium and 4 zip ties to close the  body of the sternum and the xiphoid process. Multiple intercostal nerve blocks with 0.25% Marcaine was administered parasternally for postoperative pain control.  The overlying soft tissues were re-approximated in multiple layers using vicryl and monocryl sutures. The patient's chest was washed and dried, and a sterile dressing was applied. At the conclusion of the case, sponge and instrument counts were correct. The patient tolerated the procedure without apparent complications and was promptly moved to the ICU, and I was present for the handoff.      3:28 PM   07/20/2022    Sanju Locke MD  Cardiothoracic Surgery  [facility]      Significant Surgical Tasks Conducted by the Assistant(s), if Applicable:     Estimated Blood Loss (EBL): * No values recorded between 7/20/2022  8:18 AM and 7/20/2022  2:01 PM *           Implants:   Implant Name Type Inv. Item Serial No.  Lot No. LRB No. Used Action   MARJAN-THRU INTRALUMINAL SHUNT 1.25MM   N/A  [id number] N/A 1 Implanted and Explanted   SHUNT MARJAN THRU 2.0X18 - SN/A  SHUNT MARJAN THRU 2.0X18 N/A [employer] [id number] N/A 1 Implanted and Explanted   EDGAR RADIOMARK GRAFT MARKER   N/A  [id number] N/A 1 Implanted   HEMOSTASIS OSTENE MAT 2.5GM - SN/A  HEMOSTASIS OSTENE MAT 2.5GM N/A [employer]LDON [id number] N/A 3 Implanted   Sternal Zipfix with Needle Sterile / 5 pack    N/A  [id number] N/A 1 Implanted   CATH GLIDEPATH 14.5F 19CM 24CM - SN/A Dialysis Catheter CATH GLIDEPATH 14.5F 19CM 24CM N/A [employer] [place] [id number] Right 1 Implanted       Specimens:   Specimen (24h ago, onward)            None                  Condition: Critical    Disposition: ICU - intubated and critically ill.    Attestation: I was present and scrubbed for the entire procedure.

## 2022-07-20 NOTE — PROGRESS NOTES
07/20/22 1800   Treatment   Treatment Type CVVHD   Treatment Status New start   Dialysis Machine Number [id number]   Dialyzer Time (hours) 0   BVP (Liters) 0 L   Solutions Labeled and Current  Yes   Access Right;Femoral   Catheter Dressing Intact  Yes   Alarms Engaged Yes   CRRT Comments CVVHD/CRRT initiated and in progress as ordered for Dr Hanson   $ CRRT Charges   $ CRRT Charges Initial Setup   $ CRRT Daily Maintenance Complete   Prescription   Time (Hours) Continuous   Dialysate K + (mEq/L) 4   Dialysate CA + (mEq/L) 2.5   Dialysate HCO3 - (Bicarb) (mEq/L) 35   Dialysate Na + (mEq/L) 140   Cartridge Type [id number]   Dialysate Flow Rate (mL/min) Other  (2L/hr)   UF Goal Rate 0 mL/hr   CRRT Hourly Documentation   Blood Flow (mL/min) 250   UF Rate 100 cc/hr   Arterial Pressure (mmHg) -53 mmHg   Venous Pressure (mmHg) 86 mmHg   Effluent Pressure (EP) (mmHg) 100 mmHg   Balance Chamber  82   Total UF (Hourly Cleared) (mL) 0   CRRT Fluid Management Calculator   (A) All Patient Intake 106.1 mL   (B) All Patient Output 0 mL   (C) Patient Fluid Status 106.1 mL   (D) Target Hourly Net Fluid Removal Rate 0 mL   (E) Patient Fluid Removal 106.1   (F) Actual Fluid Removal 0 mL   (G) Current Hourly Fluid Balance 106.1 mL

## 2022-07-20 NOTE — ASSESSMENT & PLAN NOTE
Controlled to mildly elevated blood pressure  Uncontrolled diabetes mellitus   Cont beta blocker  Monitor after hd   Avoid hypoglycemia pending cabg    7/20  Status post cabg  Per primary

## 2022-07-20 NOTE — SUBJECTIVE & OBJECTIVE
Interval History: See hospital course for today      Review of Systems   Unable to perform ROS: Intubated   Objective:     Vital Signs (Most Recent):  Temp: 98 °F (36.7 °C) (07/20/22 0432)  Pulse: 66 (07/20/22 0432)  Resp: 17 (07/20/22 0432)  BP: (!) 140/62 (07/20/22 0432)  SpO2: 96 % (07/20/22 0432)   Vital Signs (24h Range):  Temp:  [97.4 °F (36.3 °C)-98.1 °F (36.7 °C)] 98 °F (36.7 °C)  Pulse:  [60-78] 66  Resp:  [17-19] 17  SpO2:  [96 %-99 %] 96 %  BP: (131-150)/(56-63) 140/62     Weight: 61.9 kg (136 lb 7.4 oz)  Body mass index is 22.71 kg/m².    Intake/Output Summary (Last 24 hours) at 7/20/2022 1430  Last data filed at 7/20/2022 1240  Gross per 24 hour   Intake 1150 ml   Output 3010 ml   Net -1860 ml      Physical Exam  Vitals and nursing note reviewed. Exam conducted with a chaperone present (icu).   Constitutional:       General: She is not in acute distress.     Appearance: She is underweight. She is ill-appearing. She is not toxic-appearing.      Interventions: She is sedated and intubated.   HENT:      Head: Normocephalic and atraumatic.   Cardiovascular:      Rate and Rhythm: Normal rate.   Pulmonary:      Effort: She is intubated.      Comments: Mechanical breath sounds  Chest:      Comments: Chest tubes  Abdominal:      Palpations: Abdomen is soft.   Genitourinary:     Comments: arzate  Musculoskeletal:      Comments: Rishabh drain containing sanguinous fluid, left leg, ace wrapped   Skin:     General: Skin is warm.       Significant Labs: All pertinent labs within the past 24 hours have been reviewed.  CBC:   Recent Labs   Lab 07/19/22  0736 07/20/22  0556   WBC 5.44 4.97   HGB 11.0* 11.1*   HCT 33.8* 33.5*    259     CMP:   Recent Labs   Lab 07/19/22  0555 07/19/22 2127 07/20/22  0556   * 137 134*   K 4.8 3.9 4.4   CL 96 99 99   CO2 17* 23 25   * 225* 194*   BUN 61* 24* 30*   CREATININE 8.4* 5.1* 5.8*   CALCIUM 8.7 9.0 8.7   PROT  --  6.3  --    ALBUMIN  --  3.2*  --    BILITOT  --   0.6  --    ALKPHOS  --  92  --    AST  --  15  --    ALT  --  17  --    ANIONGAP 15 15 10   EGFRNONAA 4* 8* 7*       Significant Imaging: I have reviewed all pertinent imaging results/findings within the past 24 hours.

## 2022-07-20 NOTE — SUBJECTIVE & OBJECTIVE
Review of Systems   Unable to perform ROS: Intubated      Objective:     Vital Signs (Most Recent):  Temp: 98 °F (36.7 °C) (07/20/22 0432)  Pulse: 86 (07/20/22 1439)  Resp: 20 (07/20/22 1439)  BP: (!) 140/62 (07/20/22 0432)  SpO2: 99 % (07/20/22 1439)   Vital Signs (24h Range):  Temp:  [97.4 °F (36.3 °C)-98.1 °F (36.7 °C)] 98 °F (36.7 °C)  Pulse:  [60-86] 86  Resp:  [17-20] 20  SpO2:  [96 %-100 %] 99 %  BP: (131-150)/(56-63) 140/62     Weight: 61.9 kg (136 lb 7.4 oz)  Body mass index is 22.71 kg/m².      Intake/Output Summary (Last 24 hours) at 7/20/2022 1454  Last data filed at 7/20/2022 1240  Gross per 24 hour   Intake 1150 ml   Output 3010 ml   Net -1860 ml      sodium chloride 0.9%      sodium chloride 0.9% 50 mL/hr (07/13/22 1218)    dexmedetomidine (PRECEDEX) infusion 0.8 mcg/kg/hr (07/20/22 1444)    dextrose 5% lactated ringers 25 mL/hr at 07/20/22 1457    EPINEPHrine 0.14 mcg/kg/min (07/20/22 1455)    milrinone 20mg/100ml D5W (200mcg/ml)      niCARdipine Stopped (07/20/22 1515)    nitric oxide gas      vasopressin (PITRESSIN) infusion      vasopressin        Physical Exam  Vitals and nursing note reviewed.   Constitutional:       Appearance: She is ill-appearing.      Interventions: She is sedated and intubated.   Cardiovascular:      Rate and Rhythm: Normal rate and regular rhythm. No extrasystoles are present.     Pulses:           Radial pulses are 2+ on the right side and 2+ on the left side.        Dorsalis pedis pulses are 1+ on the right side and 1+ on the left side.      Arteriovenous access: Left arteriovenous access is present.     Comments: AV paced  Pulmonary:      Effort: No tachypnea. She is intubated.      Comments: Coarse mechanically ventilated breath sounds  Chest:       Abdominal:      General: There is no distension.   Genitourinary:     Comments: Pfeiffer catheter ~150ml  Musculoskeletal:      Right lower leg: No edema.      Left lower leg: No edema.   Skin:     General: Skin is cool and  dry.      Capillary Refill: Capillary refill takes 2 to 3 seconds.       Vents:  Vent Mode: A/C (07/20/22 1409)  Ventilator Initiated: Yes (07/20/22 1409)  Set Rate: 20 BPM (07/20/22 1409)  Vt Set: 340 mL (07/20/22 1409)  Pressure Support: 0 cmH20 (07/20/22 1409)  PEEP/CPAP: 5 cmH20 (07/20/22 1409)  Oxygen Concentration (%): (S) 50 (07/20/22 1439)  Peak Airway Pressure: 21 cmH2O (07/20/22 1409)  Plateau Pressure: 0 cmH20 (07/20/22 1409)  Total Ve: 6.73 mL (07/20/22 1409)    Lines/Drains/Airways       Drain  Duration                  Hemodialysis AV Fistula 04/01/20 2203 Left upper arm 839 days         Closed/Suction Drain 07/20/22 1154 Left Leg Bulb 19 Fr. <1 day         Hemodialysis AV Fistula 07/20/22 1348 Right thigh <1 day         Urethral Catheter 07/20/22 0740 Silicone;Temperature probe;Straight-tip 16 Fr. <1 day         Y Chest Tube 1 and 2 07/20/22 1204 1 Right Mediastinal 24 Fr. 2 Left Mediastinal 24 Fr. <1 day         Y Chest Tube 3 and 4 07/20/22 1205 3 Left Pleural 24 Fr. 4 Right Pleural 24 Fr. <1 day              Airway  Duration                  Airway - Non-Surgical 07/20/22 <1 day              Peripheral Intravenous Line  Duration                  Peripheral IV - Single Lumen 07/18/22 0625 20 G Posterior;Right Wrist 2 days                    Significant Labs:    CBC/Anemia Profile:  Recent Labs   Lab 07/19/22  0736 07/20/22  0556 07/20/22  0809 07/20/22  1335 07/20/22  1435 07/20/22  1437   WBC 5.44 4.97  --   --  15.94*  --    HGB 11.0* 11.1*  --   --  10.9*  --    HCT 33.8* 33.5*   < > 30* 31.7* 30*    259  --   --  210  --    MCV 94 91  --   --  88  --    RDW 16.4* 16.0*  --   --  16.3*  --     < > = values in this interval not displayed.        Chemistries:  Recent Labs   Lab 07/19/22  0555 07/19/22 2127 07/20/22  0556   * 137 134*   K 4.8 3.9 4.4   CL 96 99 99   CO2 17* 23 25   BUN 61* 24* 30*   CREATININE 8.4* 5.1* 5.8*   CALCIUM 8.7 9.0 8.7   ALBUMIN  --  3.2*  --    PROT  --   6.3  --    BILITOT  --  0.6  --    ALKPHOS  --  92  --    ALT  --  17  --    AST  --  15  --    PHOS 5.7*  --  4.8*         Coagulation:   Recent Labs   Lab 07/20/22  1435   APTT 30.6       POCT Glucose:   Recent Labs   Lab 07/19/22  2042 07/20/22  0535 07/20/22  1438   POCTGLUCOSE 258* 210* 240*       Significant Imaging:  I have reviewed all pertinent imaging results/findings within the past 24 hours.

## 2022-07-20 NOTE — SUBJECTIVE & OBJECTIVE
Interval History: Pt was seen and examined in ICU. Labs and meds reviewed. Discussed with other providers. Pt is s/p CABG this am. CRRT being initiated post admission to the ICU. Discussed with dialysis nurse.      Review of patient's allergies indicates:   Allergen Reactions    Codeine Swelling    Darvon [propoxyphene] Swelling    Atorvastatin      Muscle twitching     Current Facility-Administered Medications   Medication Frequency    0.9%  NaCl infusion (CRRT USE ONLY) Continuous    0.9%  NaCl infusion (for blood administration) Q24H PRN    acetaminophen oral solution 650 mg Q6H PRN    albumin human 5% bottle 25 g PRN    albuterol-ipratropium 2.5 mg-0.5 mg/3 mL nebulizer solution 3 mL Q4H    [START ON 7/21/2022] ascorbic acid (vitamin C) tablet 500 mg Daily    [START ON 7/21/2022] aspirin chewable tablet 81 mg Daily    [START ON 7/21/2022] ceFAZolin (ANCEF) 1 gram in dextrose 5 % 50 mL IVPB (premix) Q24H    chlorhexidine 0.12 % solution 10 mL BID    [START ON 7/21/2022] clopidogreL tablet 75 mg Daily    [START ON 7/22/2022] cyanocobalamin tablet 1,000 mcg Daily    dexmedetomidine (PRECEDEX) 400mcg/100mL dextrose 5% infusion Continuous    dextrose 10% bolus 125 mL PRN    dextrose 10% bolus 250 mL PRN    dextrose 5 % in lactated ringers infusion Continuous    EPINEPHrine (ADRENALIN) 5 mg in dextrose 5 % 250 mL infusion Continuous    [START ON 7/21/2022] famotidine tablet 20 mg Daily    fentaNYL 50 mcg/mL injection 50 mcg Q1H PRN    [START ON 7/22/2022] folic acid tablet 1 mg Daily    heparin (porcine) injection 4,000 Units PRN    heparin 25,000 units in dextrose 5% 250 mL (100 units/mL) infusion (heparin infusion - NO NOMOGRAM) Continuous    insulin regular 1 Units/mL in sodium chloride 0.9% 100 mL infusion Continuous PRN    lactated ringers infusion PRN    [START ON 7/21/2022] magnesium hydroxide 400 mg/5 ml suspension 2,400 mg Daily    magnesium sulfate 2g in water 50mL IVPB (premix) PRN    [START ON 7/21/2022]  metoprolol tartrate (LOPRESSOR) tablet 25 mg BID    milrinone 20mg in D5W 100 mL infusion Continuous    niCARdipine 40 mg/200 mL (0.2 mg/mL) infusion Continuous    nitric oxide gas Gas 40 ppm Continuous    ondansetron injection 4 mg Q12H PRN    [START ON 7/21/2022] polyethylene glycol packet 17 g Daily    promethazine (PHENERGAN) 12.5 mg in dextrose 5 % 50 mL IVPB Q6H PRN    sodium chloride 0.9% flush 10 mL PRN    [START ON 7/21/2022] vancomycin in dextrose 5 % 1 gram/250 mL IVPB 1,000 mg Q24H    vasopressin (PITRESSIN) 0.2 Units/mL in dextrose 5 % 100 mL infusion Continuous    vitamin renal formula (B-complex-vitamin c-folic acid) 1 mg per capsule 1 capsule Daily       Objective:     Vital Signs (Most Recent):  Temp: 96.8 °F (36 °C) (07/20/22 1600)  Pulse: 92 (07/20/22 1600)  Resp: 20 (07/20/22 1600)  BP: (!) 119/56 (07/20/22 1600)  SpO2: 96 % (07/20/22 1600)  O2 Device (Oxygen Therapy): ventilator (07/20/22 1409)   Vital Signs (24h Range):  Temp:  [96.8 °F (36 °C)-98.1 °F (36.7 °C)] 96.8 °F (36 °C)  Pulse:  [65-92] 92  Resp:  [14-23] 20  SpO2:  [93 %-100 %] 96 %  BP: (113-150)/(56-63) 119/56  Arterial Line BP: ()/(41-46) 116/41     Weight: 61.9 kg (136 lb 7.4 oz) (07/16/22 0003)  Body mass index is 22.71 kg/m².  Body surface area is 1.68 meters squared.    I/O last 3 completed shifts:  In: 500 [Other:500]  Out: 3010 [Urine:3; Other:3007]    Physical Exam  Vitals and nursing note reviewed.   Cardiovascular:      Rate and Rhythm: Regular rhythm. Tachycardia present.      Pulses: Normal pulses.      Heart sounds: Normal heart sounds.   Pulmonary:      Breath sounds: Normal breath sounds.   Musculoskeletal:      Right lower leg: No edema.      Left lower leg: No edema.       Significant Labs: reviewed  BMP  Lab Results   Component Value Date     (L) 07/20/2022    K 3.7 07/20/2022     07/20/2022    CO2 17 (L) 07/20/2022    BUN 33 (H) 07/20/2022    CREATININE 5.4 (H) 07/20/2022    CALCIUM 9.1  07/20/2022    ANIONGAP 17 (H) 07/20/2022    ESTGFRAFRICA 8 (A) 07/20/2022    EGFRNONAA 7 (A) 07/20/2022     Lab Results   Component Value Date    WBC 15.94 (H) 07/20/2022    HGB 10.9 (L) 07/20/2022    HCT 30 (L) 07/20/2022    MCV 88 07/20/2022     07/20/2022       ABG  Recent Labs   Lab 07/20/22  1437   PH 7.344*   PO2 525*   PCO2 42.7   HCO3 23.3*   BE -2       Significant Imaging: reviewed

## 2022-07-20 NOTE — PROGRESS NOTES
[facility] - Intensive Care ([facility])  Critical Care Medicine  Progress Note    Patient Name: vAril Monzon  MRN: [id number]  Admission Date: 7/12/2022  Hospital Length of Stay: 8 days  Code Status: Full Code  Attending Provider: Sanju Locke MD  Primary Care Provider: Rose Parks MD   Principal Problem: S/P CABG (coronary artery bypass graft)    Subjective:     HPI:  77W pmh ESRD, DM, CAD s/p stent, HFpEF (Gr2 DD), HLD, HTN, NSTEMI, seizure, tobacco dependence, and CVA presents with NSTEMI .  Her hospital course has been complicated by DKA.    In ED, EKG with multiple leads with ST depressions.  Initial Troponin approx 12.  She is undergoing HD for volume mgmt.   She is on the DKA protocol with insulin drip, fluids and serial electrolytes.      Hospital/ICU Course:  7/12: Aspirin.  Heparin drip for NSTEMI.  Insulin drip for DKA.  Undergoing HD.  7/20: Day 0: CABG x2. Arrives to ICU intubated. 2 PRBCs received intra-op. Currently on milrinone, epi, & nitric oxide @ 40ppm.       Review of Systems   Unable to perform ROS: Intubated      Objective:     Vital Signs (Most Recent):  Temp: 98 °F (36.7 °C) (07/20/22 0432)  Pulse: 86 (07/20/22 1439)  Resp: 20 (07/20/22 1439)  BP: (!) 140/62 (07/20/22 0432)  SpO2: 99 % (07/20/22 1439)   Vital Signs (24h Range):  Temp:  [97.4 °F (36.3 °C)-98.1 °F (36.7 °C)] 98 °F (36.7 °C)  Pulse:  [60-86] 86  Resp:  [17-20] 20  SpO2:  [96 %-100 %] 99 %  BP: (131-150)/(56-63) 140/62     Weight: 61.9 kg (136 lb 7.4 oz)  Body mass index is 22.71 kg/m².      Intake/Output Summary (Last 24 hours) at 7/20/2022 1454  Last data filed at 7/20/2022 1240  Gross per 24 hour   Intake 1150 ml   Output 3010 ml   Net -1860 ml      sodium chloride 0.9%      sodium chloride 0.9% 50 mL/hr (07/13/22 1218)    dexmedetomidine (PRECEDEX) infusion 0.8 mcg/kg/hr (07/20/22 1444)    dextrose 5% lactated ringers 25 mL/hr at 07/20/22 1457    EPINEPHrine 0.14 mcg/kg/min (07/20/22 1455)    milrinone 20mg/100ml  D5W (200mcg/ml)      niCARdipine Stopped (07/20/22 1515)    nitric oxide gas      vasopressin (PITRESSIN) infusion      vasopressin        Physical Exam  Vitals and nursing note reviewed.   Constitutional:       Appearance: She is ill-appearing.      Interventions: She is sedated and intubated.   Cardiovascular:      Rate and Rhythm: Normal rate and regular rhythm. No extrasystoles are present.     Pulses:           Radial pulses are 2+ on the right side and 2+ on the left side.        Dorsalis pedis pulses are 1+ on the right side and 1+ on the left side.      Arteriovenous access: Left arteriovenous access is present.     Comments: AV paced  Pulmonary:      Effort: No tachypnea. She is intubated.      Comments: Coarse mechanically ventilated breath sounds  Chest:       Abdominal:      General: There is no distension.   Genitourinary:     Comments: Pfeiffer catheter ~150ml  Musculoskeletal:      Right lower leg: No edema.      Left lower leg: No edema.   Skin:     General: Skin is cool and dry.      Capillary Refill: Capillary refill takes 2 to 3 seconds.       Vents:  Vent Mode: A/C (07/20/22 1409)  Ventilator Initiated: Yes (07/20/22 1409)  Set Rate: 20 BPM (07/20/22 1409)  Vt Set: 340 mL (07/20/22 1409)  Pressure Support: 0 cmH20 (07/20/22 1409)  PEEP/CPAP: 5 cmH20 (07/20/22 1409)  Oxygen Concentration (%): (S) 50 (07/20/22 1439)  Peak Airway Pressure: 21 cmH2O (07/20/22 1409)  Plateau Pressure: 0 cmH20 (07/20/22 1409)  Total Ve: 6.73 mL (07/20/22 1409)    Lines/Drains/Airways       Drain  Duration                  Hemodialysis AV Fistula 04/01/20 2203 Left upper arm 839 days         Closed/Suction Drain 07/20/22 1154 Left Leg Bulb 19 Fr. <1 day         Hemodialysis AV Fistula 07/20/22 1348 Right thigh <1 day         Urethral Catheter 07/20/22 0740 Silicone;Temperature probe;Straight-tip 16 Fr. <1 day         Y Chest Tube 1 and 2 07/20/22 1204 1 Right Mediastinal 24 Fr. 2 Left Mediastinal 24 Fr. <1 day          Y Chest Tube 3 and 4 07/20/22 1205 3 Left Pleural 24 Fr. 4 Right Pleural 24 Fr. <1 day              Airway  Duration                  Airway - Non-Surgical 07/20/22 <1 day              Peripheral Intravenous Line  Duration                  Peripheral IV - Single Lumen 07/18/22 0625 20 G Posterior;Right Wrist 2 days                    Significant Labs:    CBC/Anemia Profile:  Recent Labs   Lab 07/19/22  0736 07/20/22  0556 07/20/22  0809 07/20/22  1335 07/20/22  1435 07/20/22  1437   WBC 5.44 4.97  --   --  15.94*  --    HGB 11.0* 11.1*  --   --  10.9*  --    HCT 33.8* 33.5*   < > 30* 31.7* 30*    259  --   --  210  --    MCV 94 91  --   --  88  --    RDW 16.4* 16.0*  --   --  16.3*  --     < > = values in this interval not displayed.        Chemistries:  Recent Labs   Lab 07/19/22  0555 07/19/22  2127 07/20/22  0556   * 137 134*   K 4.8 3.9 4.4   CL 96 99 99   CO2 17* 23 25   BUN 61* 24* 30*   CREATININE 8.4* 5.1* 5.8*   CALCIUM 8.7 9.0 8.7   ALBUMIN  --  3.2*  --    PROT  --  6.3  --    BILITOT  --  0.6  --    ALKPHOS  --  92  --    ALT  --  17  --    AST  --  15  --    PHOS 5.7*  --  4.8*         Coagulation:   Recent Labs   Lab 07/20/22  1435   APTT 30.6       POCT Glucose:   Recent Labs   Lab 07/19/22  2042 07/20/22  0535 07/20/22  1438   POCTGLUCOSE 258* 210* 240*       Significant Imaging:  I have reviewed all pertinent imaging results/findings within the past 24 hours.      ABG  Recent Labs   Lab 07/20/22  1437   PH 7.344*   PO2 525*   PCO2 42.7   HCO3 23.3*   BE -2     Assessment/Plan:     Pulmonary  On mechanically assisted ventilation  - per CTS mgmt, will assist   - Intubated today  - will wean as clinically tolerating  - VAP prophylaxis  - daily SAT/SBT  - vent settings reviewed for optimal ventilation    Cardiac/Vascular  * S/P CABG (coronary artery bypass graft)  - per CTS mgmt  - Day 0: s/p CABG to    1. pediciled LIMA to LAD   2. Saphenous vein graft to obtuse marginal  - pressors to  maintain MAP >65, wean as clinically tolerated  - monitor CT output    Elevated troponin  Due to NSTEMI  See assessment and plan for NSTEMI    CAD (coronary artery disease)  - CTS mgmt  - Day 0: s/p CABG   - 7/13 LHC:  · The pre-procedure left ventricular end diastolic pressure was 31.  · The post-procedure left ventricular end diastolic pressure was 32.  · The ejection fraction was calculated to be 40%.  · The Prox RCA to Mid RCA lesion was 100% stenosed.  · The Ost Cx to Prox Cx lesion was 99% stenosed.  · The Prox LAD to Mid LAD lesion was 75% stenosed.  · The estimated blood loss was none.  · There was three vessel coronary artery disease.    NSTEMI (non-ST elevated myocardial infarction)  - see s/p CABG assessment & plan    Renal/  Metabolic acidosis  - mild improvement in metabolic acidosis with   - cont to monitor serum CO2  - HD per nephrology mgmt        ESRD (end stage renal disease)  - MWF dialysis  - HD yesterday  - renal following  - monitor BMP     Critical Care Daily Checklist:    A: Awake: RASS Goal/Actual Goal: RASS Goal: 0-->alert and calm  Actual:     B: Spontaneous Breathing Trial Performed?     C: SAT & SBT Coordinated?  S/p CABG                      D: Delirium: CAM-ICU     E: Early Mobility Performed? No- per CTS    F: Feeding Goal: Goals: Patient to tolerate cardiac, renal oral diet.  Status: Nutrition Goal Status: new   Current Diet Order   Procedures    Diet NPO Except for: Sips with Medication     Order Specific Question:   Except for     Answer:   Sips with Medication      AS: Analgesia/Sedation Reviewed- CTS mgmt   T: Thromboembolic Prophylaxis SCDs- CTS mgmt   H: HOB > 300 No   U: Stress Ulcer Prophylaxis (if needed) Famotodine- CTS mgmt   G: Glucose Control Insulin   B: Bowel Function Stool Occurrence: 1   I: Indwelling Catheter (Lines & Pfeiffer) Necessity Reviewed maintain for necessity   D: De-escalation of Antimicrobials/Pharmacotherapies Reviewed- CTS mgmt    Plan for the day/ETD  Stabilize and monitor hemodynamics, wean off pressor support as tolerated    Code Status:  Family/Goals of Care: Full Code  Elizabeth (daughter) updated on plan of care   I have discussed case and plan of care in detail with Dr. Locke; Status and plan of care discussed and will further discuss with team on multidisciplinary rounds.  Critical Care Time: 70 minutes  Critical secondary to Patient has a condition that poses threat to life and bodily function: s/p CABG, on mechanically assisted ventilation  Patient is currently on drug therapy requiring intensive monitoring for toxicity: epinephrine, insulin, milrinone      Critical care was time spent personally by me on the following activities: development of treatment plan with patient or surrogate and bedside caregivers, discussions with consultants, evaluation of patient's response to treatment, examination of patient, ordering and performing treatments and interventions, ordering and review of laboratory studies, ordering and review of radiographic studies, pulse oximetry, re-evaluation of patient's condition. This critical care time did not overlap with that of any other provider or involve time for any procedures.     Loren Stevenson NP  Critical Care Medicine  [facility] - Intensive Care ([facility])

## 2022-07-20 NOTE — ANESTHESIA PROCEDURE NOTES
[place] [facility]    Diagnosis: CABG  Patient location during procedure: done in OR  Procedure start time: 7/20/2022 7:33 AM  Timeout: 7/20/2022 7:33 AM  Procedure end time: 7/20/2022 7:48 AM    Staffing  Authorizing Provider: Noman Lamb II, MD  Performing Provider: Noman Lamb II, MD    Anesthesiologist was present at the time of the procedure.  Preanesthetic Checklist  Completed: patient identified, IV checked, site marked, risks and benefits discussed, surgical consent, monitors and equipment checked, pre-op evaluation, timeout performed and anesthesia consent given  [place] [facility] [facility]  Skin Prep: chlorhexidine gluconate  Local Infiltration: none  Location: right,  internal jugular vein  Vessel Caliber: large, patent, compressibility normal  Device: CCO/Oximetric Catheter   Catheter Size: 8.5 Fr  Catheter placement by yes. Heme positive aspiration all ports. PAC floated with balloon up not wedgedSterile sheath used  Locked at: 45 cm.Insertion Attempts: 1  Indication: intravenous therapy, hemodynamic monitoring  Ultrasound Guidance  Needle advanced into vessel with real time Ultrasound guidance.  Image recorded and saved.  Guidewire confirmed in vessel.  Sterile sheath used.  Assessment  Central Line Bundle Protocol followed. Hand hygiene before procedure, surgical cap worn, surgical mask worn, sterile surgical gloves worn, large sterile drape used.  Verification: ultrasound and blood return  Dressing: sutured in place and taped and tegaderm

## 2022-07-20 NOTE — ASSESSMENT & PLAN NOTE
- per CTS mgmt  - Day 0: s/p CABG to    1. pediciled LIMA to LAD   2. Saphenous vein graft to obtuse marginal  - pressors to maintain MAP >65, wean as clinically tolerated  - monitor CT output

## 2022-07-20 NOTE — PROGRESS NOTES
[facility] - Intensive Care ([facility])  Nephrology  Progress Note    Patient Name: Avril Monzon  MRN: [id number]  Admission Date: 7/12/2022  Hospital Length of Stay: 8 days  Attending Provider: Sanju Locke MD   Primary Care Physician: Rose Parks MD  Principal Problem:S/P CABG (coronary artery bypass graft)    Subjective:     HPI: Noted    Interval History: Pt was seen and examined in ICU. Labs and meds reviewed. Discussed with other providers. Pt is s/p CABG this am. CRRT being initiated post admission to the ICU. Discussed with dialysis nurse.      Review of patient's allergies indicates:   Allergen Reactions    Codeine Swelling    Darvon [propoxyphene] Swelling    Atorvastatin      Muscle twitching     Current Facility-Administered Medications   Medication Frequency    0.9%  NaCl infusion (CRRT USE ONLY) Continuous    0.9%  NaCl infusion (for blood administration) Q24H PRN    acetaminophen oral solution 650 mg Q6H PRN    albumin human 5% bottle 25 g PRN    albuterol-ipratropium 2.5 mg-0.5 mg/3 mL nebulizer solution 3 mL Q4H    [START ON 7/21/2022] ascorbic acid (vitamin C) tablet 500 mg Daily    [START ON 7/21/2022] aspirin chewable tablet 81 mg Daily    [START ON 7/21/2022] ceFAZolin (ANCEF) 1 gram in dextrose 5 % 50 mL IVPB (premix) Q24H    chlorhexidine 0.12 % solution 10 mL BID    [START ON 7/21/2022] clopidogreL tablet 75 mg Daily    [START ON 7/22/2022] cyanocobalamin tablet 1,000 mcg Daily    dexmedetomidine (PRECEDEX) 400mcg/100mL dextrose 5% infusion Continuous    dextrose 10% bolus 125 mL PRN    dextrose 10% bolus 250 mL PRN    dextrose 5 % in lactated ringers infusion Continuous    EPINEPHrine (ADRENALIN) 5 mg in dextrose 5 % 250 mL infusion Continuous    [START ON 7/21/2022] famotidine tablet 20 mg Daily    fentaNYL 50 mcg/mL injection 50 mcg Q1H PRN    [START ON 7/22/2022] folic acid tablet 1 mg Daily    heparin (porcine) injection 4,000 Units PRN    heparin 25,000  units in dextrose 5% 250 mL (100 units/mL) infusion (heparin infusion - NO NOMOGRAM) Continuous    insulin regular 1 Units/mL in sodium chloride 0.9% 100 mL infusion Continuous PRN    lactated ringers infusion PRN    [START ON 7/21/2022] magnesium hydroxide 400 mg/5 ml suspension 2,400 mg Daily    magnesium sulfate 2g in water 50mL IVPB (premix) PRN    [START ON 7/21/2022] metoprolol tartrate (LOPRESSOR) tablet 25 mg BID    milrinone 20mg in D5W 100 mL infusion Continuous    niCARdipine 40 mg/200 mL (0.2 mg/mL) infusion Continuous    nitric oxide gas Gas 40 ppm Continuous    ondansetron injection 4 mg Q12H PRN    [START ON 7/21/2022] polyethylene glycol packet 17 g Daily    promethazine (PHENERGAN) 12.5 mg in dextrose 5 % 50 mL IVPB Q6H PRN    sodium chloride 0.9% flush 10 mL PRN    [START ON 7/21/2022] vancomycin in dextrose 5 % 1 gram/250 mL IVPB 1,000 mg Q24H    vasopressin (PITRESSIN) 0.2 Units/mL in dextrose 5 % 100 mL infusion Continuous    vitamin renal formula (B-complex-vitamin c-folic acid) 1 mg per capsule 1 capsule Daily       Objective:     Vital Signs (Most Recent):  Temp: 96.8 °F (36 °C) (07/20/22 1600)  Pulse: 92 (07/20/22 1600)  Resp: 20 (07/20/22 1600)  BP: (!) 119/56 (07/20/22 1600)  SpO2: 96 % (07/20/22 1600)  O2 Device (Oxygen Therapy): ventilator (07/20/22 1409)   Vital Signs (24h Range):  Temp:  [96.8 °F (36 °C)-98.1 °F (36.7 °C)] 96.8 °F (36 °C)  Pulse:  [65-92] 92  Resp:  [14-23] 20  SpO2:  [93 %-100 %] 96 %  BP: (113-150)/(56-63) 119/56  Arterial Line BP: ()/(41-46) 116/41     Weight: 61.9 kg (136 lb 7.4 oz) (07/16/22 0003)  Body mass index is 22.71 kg/m².  Body surface area is 1.68 meters squared.    I/O last 3 completed shifts:  In: 500 [Other:500]  Out: 3010 [Urine:3; Other:3007]    Physical Exam  Vitals and nursing note reviewed.   Cardiovascular:      Rate and Rhythm: Regular rhythm. Tachycardia present.      Pulses: Normal pulses.      Heart sounds: Normal heart  sounds.   Pulmonary:      Breath sounds: Normal breath sounds.   Musculoskeletal:      Right lower leg: No edema.      Left lower leg: No edema.       Significant Labs: reviewed  BMP  Lab Results   Component Value Date     (L) 07/20/2022    K 3.7 07/20/2022     07/20/2022    CO2 17 (L) 07/20/2022    BUN 33 (H) 07/20/2022    CREATININE 5.4 (H) 07/20/2022    CALCIUM 9.1 07/20/2022    ANIONGAP 17 (H) 07/20/2022    ESTGFRAFRICA 8 (A) 07/20/2022    EGFRNONAA 7 (A) 07/20/2022     Lab Results   Component Value Date    WBC 15.94 (H) 07/20/2022    HGB 10.9 (L) 07/20/2022    HCT 30 (L) 07/20/2022    MCV 88 07/20/2022     07/20/2022       ABG  Recent Labs   Lab 07/20/22  1437   PH 7.344*   PO2 525*   PCO2 42.7   HCO3 23.3*   BE -2       Significant Imaging: reviewed    Assessment/Plan:     76 y/o female with ESRD on chronic HD presented with acute coronary syndrome:     ESRD (end stage renal disease)  ESRD pt on chronic HD q MWF at [facility] normal  O2 sat OK  On CRRT post CABG. Tolerating well, continue CRRT  Dialysis bath and UF rate discussed with nursing staff  Pt was visited multiple times in ICU     HTN: BP stable  Meds reviewed     Anemia: mild, normocytic  On epogen with HD      * Acute coronary events  NSTEMI  Troponin very high  S/p CABG this am  Reviewed the chart  Discussed with CT surgery      Type 2 diabetes mellitus with hyperglycemia, with long-term current use of insulin  Reviewed the chart.        Plans and recommendations:  As discussed above  Total critical care time spent 40 minutes including time needed to review the records, the   patient evaluation, documentation, face-to-face discussion with the patient,   more than 50% of the time was spent on coordination of care and counseling.   Multiple medical issues were addressed, as documented. Medical care provided was in addition to providing dialysis. Pt received multiple visits and evaluations.           Angela Hanson,  MD  Nephrology  [facility] - Intensive Care ([facility])

## 2022-07-20 NOTE — ASSESSMENT & PLAN NOTE
- per CTS mgmt, will assist   - Intubated today  - will wean as clinically tolerating  - VAP prophylaxis  - daily SAT/SBT  - vent settings reviewed for optimal ventilation

## 2022-07-20 NOTE — ASSESSMENT & PLAN NOTE
- CTS mgmt  - Day 0: s/p CABG   - 7/13 LHC:  · The pre-procedure left ventricular end diastolic pressure was 31.  · The post-procedure left ventricular end diastolic pressure was 32.  · The ejection fraction was calculated to be 40%.  · The Prox RCA to Mid RCA lesion was 100% stenosed.  · The Ost Cx to Prox Cx lesion was 99% stenosed.  · The Prox LAD to Mid LAD lesion was 75% stenosed.  · The estimated blood loss was none.  · There was three vessel coronary artery disease.

## 2022-07-20 NOTE — ANESTHESIA PROCEDURE NOTES
Intubation    Date/Time: 7/20/2022 7:31 AM  Performed by: Sedrick Ferreira CRNA  Authorized by: Noman Lamb II, MD     Intubation:     Induction:  Intravenous    Intubated:  Postinduction    Mask Ventilation:  Easy mask    Attempts:  1    Attempted By:  Other (see comments) (JADIEL Padilla, SRNA)    Blade:  Karissa 3    Laryngeal View Grade: Grade I - full view of cords      Difficult Airway Encountered?: No      Complications:  None    Airway Device:  Oral endotracheal tube    Airway Device Size:  8.0    Style/Cuff Inflation:  Cuffed    Inflation Amount (mL):  3    Tube secured:  21    Secured at:  The lips    Placement Verified By:  Capnometry    Complicating Factors:  None    Findings Post-Intubation:  BS equal bilateral and atraumatic/condition of teeth unchanged

## 2022-07-20 NOTE — ASSESSMENT & PLAN NOTE
76 y/o female with ESRD on chronic HD presented with acute coronary syndrome:     ESRD (end stage renal disease)  ESRD pt on chronic HD q MWF at [facility]  K normal  O2 sat OK  Continue HD q MWF and today per CT surgery request  Tolerating HD well, continue HD  Expect will be on CRRT after CABG. Will provide     HTN: BP controlled to low  Meds reviewed  Will hold hydralazine 25 mg po bid     Anemia: mild, normocytic  On epogen with HD      * Acute coronary events  Presented with SOB   NSTEMI  Troponin very high  Reviewed cardiology and CT surgery notes  CABG planned for tomorrow  CRRT post CABG      Type 2 diabetes mellitus with hyperglycemia, with long-term current use of insulin  Reviewed the chart.        Plans and recommendations:  As discussed above  Total time spent 40 minutes including time needed to review the records, the   patient evaluation, documentation, face-to-face discussion with the patient,   more than 50% of the time was spent on coordination of care and counseling.

## 2022-07-20 NOTE — PLAN OF CARE
Nutrition Interventions/Recommendation (treatment strategy):  1.  To monitor length of NPO status  2.  To recommended Cardiac, Renal diet as oral intake is appropriate    Mariah Sauer MS, RDN, LDN

## 2022-07-20 NOTE — ASSESSMENT & PLAN NOTE
Cardiology consulted  Cont Heparin drip , asa , plavix and BB  TTE  Trend troponin level   S/P LHC which multiple vessel ds   CVT consulted     Plan for CABG   Transfer of care post cabg to CTS

## 2022-07-20 NOTE — PROGRESS NOTES
"[facility] - [facility] ([facility])  Adult Nutrition  Progress Note    SUMMARY       Recommendations    Recommendation/Intervention:   1. To advance oral diet as appropriate.    2. To recommended a cardiac, renal diet    Goals: Patient to tolerate cardiac, renal oral diet.    Nutrition Goal Status: new    Communication of RD Recs: other (comment) (plan of care; sticky notes)      Assessment and Plan    Nutrition Problem  Inadequate po intake     Related to (etiology):   Npo status     Signs and Symptoms (as evidenced by):   inadequate oral intake     Interventions/Recommendation (treatment strategy):  1.  To monitor length of NPO status  2.  To recommended Cardiac, Renal diet as oral intake is appropriate    Nutrition Diagnosis Status:   New      Malnutrition Assessment                                       Reason for Assessment    Reason For Assessment: RD follow-up    Diagnosis: other (see comments) (s/p surgical procedure)    Relevant Medical History: Nstemi, ESRD on HD, Pulmonary edema, Diabetes    Interdisciplinary Rounds: did not attend    General Information Comments:   7/20/2022:Patient pending surgical procedure at this time.  Patient with NPO status at this time.  Oral supplements previous ordered:  Novosource BID.  Last bowel movement documented on 7/14/2022.   Will continue to monitor npo status and tolerance issues.  (RD remote)    Nutrition Discharge Planning: Patient to continue and tolerate cardiac/renal diet.    Nutrition Risk Screen    Nutrition Risk Screen: other (see comments) (npo status)    Nutrition/Diet History    Spiritual, Cultural Beliefs, Buddhism Practices, Values that Affect Care: no  Food Allergies: NKFA  Factors Affecting Nutritional Intake: NPO    Anthropometrics    Temp: 98 °F (36.7 °C)  Height Method: Stated  Height: 5' 5" (165.1 cm)  Height (inches): 65 in  Weight Method: Bed Scale  Weight: 61.9 kg (136 lb 7.4 oz)  Weight (lb): 136.47 lb  Ideal Body Weight (IBW), Female: 125 lb  % Ideal " Body Weight, Female (lb): 108.8 %  BMI (Calculated): 22.7  BMI Grade: 18.5-24.9 - normal       Lab/Procedures/Meds  BMP  Lab Results   Component Value Date     (L) 07/20/2022    K 4.4 07/20/2022    CL 99 07/20/2022    CO2 25 07/20/2022    BUN 30 (H) 07/20/2022    CREATININE 5.8 (H) 07/20/2022    CALCIUM 8.7 07/20/2022    ANIONGAP 10 07/20/2022    ESTGFRAFRICA 8 (A) 07/20/2022    EGFRNONAA 7 (A) 07/20/2022     Lab Results   Component Value Date     (L) 07/20/2022    K 4.4 07/20/2022    CL 99 07/20/2022    CO2 25 07/20/2022     Lab Results   Component Value Date    CALCIUM 8.7 07/20/2022    PHOS 4.8 (H) 07/20/2022     Lab Results   Component Value Date    LABPROT 10.3 07/12/2022    ALBUMIN 3.2 (L) 07/19/2022     Lab Results   Component Value Date    HGBA1C 9.3 (H) 07/12/2022     Lab Results   Component Value Date    CREATININE 5.8 (H) 07/20/2022    BUN 30 (H) 07/20/2022     (L) 07/20/2022    K 4.4 07/20/2022    CL 99 07/20/2022    CO2 25 07/20/2022     Scheduled Meds:   aspirin  81 mg Oral Daily    famotidine  20 mg Oral Daily    insulin detemir U-100  6 Units Subcutaneous QHS    metoprolol tartrate  50 mg Oral BID    senna-docusate 8.6-50 mg  2 tablet Oral Daily    vitamin renal formula (B-complex-vitamin c-folic acid)  1 capsule Oral Daily     Continuous Infusions:   sodium chloride 0.9% 50 mL/hr (07/13/22 1218)    EPINEPHrine      vasopressin (PITRESSIN) infusion       PRN Meds:.sodium chloride, sodium chloride 0.9%, acetaminophen, amiodarone, atropine, BUPivacaine (PF) 0.25% (2.5 mg/ml), BUPivacaine liposome (PF), ceFAZolin (ANCEF) IVPB, ceFAZolin, chlorhexidine, dextrose 10 % in water (D10W), dextrose 10 % in water (D10W), dextrose 10%, dextrose 10%, glucagon (human recombinant), glucose, glucose, heparin (porcine), insulin aspart U-100, metoprolol tartrate, neomycin-polymyxin B, nitroGLYCERIN, papaverine, promethazine (PHENERGAN) IVPB, sodium chloride 0.9%, vancomycin    Pertinent Labs  Reviewed: reviewed  Pertinent Medications Reviewed: reviewed    Physical Findings/Assessment         Estimated/Assessed Needs    Weight Used For Calorie Calculations: 61.9 kg (136 lb 7.4 oz)  Energy Calorie Requirements (kcal): 30-35 kcals/kg  Energy Need Method: Kcal/kg (ESRD)  Protein Requirements: 1.2-2.0g/kg  Weight Used For Protein Calculations: 61.9 kg (136 lb 7.4 oz)  Fluid Requirements (mL): 1000+output  Estimated Fluid Requirement Method: other (see comments) (ESRD on HD)  RDA Method (mL): 30  CHO Requirement: 232-270 per day      Nutrition Prescription Ordered    Current Diet Order: NPO  Oral Nutrition Supplement: Novasource BID    Evaluation of Received Nutrient/Fluid Intake    % Kcal Needs:  (npo)  % Protein Needs:  (npo)  Energy Calories Required: other (see comments) (npo)  Protein Required: other (see comments) (npo)  Fluid Required: other (see comments) (npo)  Tolerance: other (see comments) (npo)  % Intake of Estimated Energy Needs: Other: NPO status   % Meal Intake: NPO    Nutrition Risk    Level of Risk/Frequency of Follow-up: moderate - high     Monitor and Evaluation    Food and Nutrient Intake: energy intake, food and beverage intake  Food and Nutrient Adminstration: diet order  Knowledge/Beliefs/Attitudes: food and nutrition knowledge/skill, beliefs and attitudes  Physical Activity and Function: nutrition-related ADLs and IADLs, factors affecting access to physical activity  Anthropometric Measurements: height/length, weight, weight change, body mass index  Biochemical Data, Medical Tests and Procedures: electrolyte and renal panel, lipid profile, gastrointestinal profile, glucose/endocrine profile, inflammatory profile     Nutrition Follow-Up    RD Follow-up?: Yes     Mariah Sauer, MS, RDN, LDN

## 2022-07-20 NOTE — ANESTHESIA PROCEDURE NOTES
Arterial    Diagnosis: CABG    Patient location during procedure: done in OR  Procedure start time: 7/20/2022 7:12 AM  Timeout: 7/20/2022 7:12 AM  Procedure end time: 7/20/2022 7:21 AM    Staffing  Authorizing Provider: Noman Lamb II, MD  Performing Provider: Noman Lamb II, MD    Anesthesiologist was present at the time of the procedure.    Preanesthetic Checklist  Completed: patient identified, IV checked, site marked, risks and benefits discussed, surgical consent, monitors and equipment checked, pre-op evaluation, timeout performed and anesthesia consent givenArterial  Skin Prep: chlorhexidine gluconate  Local Infiltration: lidocaine  Orientation: right  Location: radial    Catheter Size: 20 G  Catheter placement by Ultrasound guidance. Heme positive aspiration all ports.   Vessel Caliber: small, patent, compressibility poor  Needle advanced into vessel with real time Ultrasound guidance.  Guidewire confirmed in vessel.  Sterile sheath used.  Image recorded and saved.Insertion Attempts: 2  Assessment  Dressing: sutured in place and taped and tegaderm  Additional Notes  Artery with some calcification throughout.

## 2022-07-20 NOTE — ANESTHESIA POSTPROCEDURE EVALUATION
Anesthesia Post Evaluation    Patient: Avril Monzon    Procedure(s) Performed: Procedure(s) (LRB):  CORONARY ARTERY BYPASS GRAFT (CABG) (N/A)  ECHOCARDIOGRAM,TRANSESOPHAGEAL (N/A)  BLOCK, NERVE, INTERCOSTAL, 2 OR MORE (N/A)  SURGICAL PROCUREMENT, VEIN, ENDOSCOPIC (Left)  INSERTION, VASCULAR ACCESS CATHETER (N/A)    Final Anesthesia Type: general      Patient location during evaluation: ICU  Patient participation: No - Unable to Participate, Intubation  Level of consciousness: sedated  Post-procedure vital signs: reviewed and stable  Pain management: adequate  Airway patency: patent    PONV status at discharge: No PONV  Anesthetic complications: no      Cardiovascular status: blood pressure returned to baseline  Respiratory status: intubated and ventilator  Hydration status: euvolemic  Follow-up not needed.          Vitals Value Taken Time   /59 07/20/22 1501   Temp 36 °C (96.8 °F) 07/20/22 1553   Pulse 92 07/20/22 1553   Resp 20 07/20/22 1553   SpO2 96 % 07/20/22 1553   Vitals shown include unvalidated device data.      No case tracking events are documented in the log.      Pain/Griffin Score: No data recorded

## 2022-07-20 NOTE — ASSESSMENT & PLAN NOTE
Nephrology consulted for mwf maintenance   HD per nephrology   Off schedule for now due to impending surgery  Received 1 unit of blood post dialysis  Per nephrology, recommend transfusion during dialysis if possible  Hb/hct stable     CTS requesting dialysis today   Nephrology aware and following

## 2022-07-20 NOTE — ASSESSMENT & PLAN NOTE
Cont asa , bb and statin  S/P LHC which show multiple vessel ds   CVT consulted . Plan for CABG pending tomorrow    7/20  Status post cabg

## 2022-07-20 NOTE — TRANSFER OF CARE
"Anesthesia Transfer of Care Note    Patient: Avril Monzon    Procedure(s) Performed: Procedure(s) (LRB):  CORONARY ARTERY BYPASS GRAFT (CABG) (N/A)  ECHOCARDIOGRAM,TRANSESOPHAGEAL (N/A)  BLOCK, NERVE, INTERCOSTAL, 2 OR MORE (N/A)  SURGICAL PROCUREMENT, VEIN, ENDOSCOPIC (Left)  INSERTION, VASCULAR ACCESS CATHETER (N/A)    Patient location: ICU    Anesthesia Type: general    Transport from OR: Upon arrival to PACU/ICU, patient attached to ventilator and auscultated to confirm bilateral breath sounds and adequate TV. Continuous ECG monitoring in transport. Continuous SpO2 monitoring in transport. Continuos invasive BP monitoring in transport    Post pain: adequate analgesia    Post assessment: no apparent anesthetic complications    Post vital signs: stable    Level of consciousness: sedated    Nausea/Vomiting: no nausea/vomiting    Complications: none    Transfer of care protocol was followedComments: Transport Vent utilized.      Last vitals:   Visit Vitals  BP (!) 125/59   Pulse 90   Temp 36.1 °C (96.98 °F)   Resp (!) 23   Ht 5' 5" (1.651 m)   Wt 61.9 kg (136 lb 7.4 oz)   LMP  (LMP Unknown)   SpO2 (!) 93%   Breastfeeding No   BMI 22.71 kg/m²     "

## 2022-07-21 LAB
ALBUMIN SERPL BCP-MCNC: 3.3 G/DL (ref 3.5–5.2)
ALBUMIN SERPL BCP-MCNC: 3.3 G/DL (ref 3.5–5.2)
ALBUMIN SERPL BCP-MCNC: 3.6 G/DL (ref 3.5–5.2)
ALLENS TEST: ABNORMAL
ANION GAP SERPL CALC-SCNC: 10 MMOL/L (ref 8–16)
ANION GAP SERPL CALC-SCNC: 11 MMOL/L (ref 8–16)
ANION GAP SERPL CALC-SCNC: 12 MMOL/L (ref 8–16)
ANION GAP SERPL CALC-SCNC: 13 MMOL/L (ref 8–16)
ANION GAP SERPL CALC-SCNC: 13 MMOL/L (ref 8–16)
APTT BLDCRRT: 39.8 SEC (ref 21–32)
BASOPHILS # BLD AUTO: 0.02 K/UL (ref 0–0.2)
BASOPHILS NFR BLD: 0.2 % (ref 0–1.9)
BLD PROD TYP BPU: NORMAL
BLOOD UNIT EXPIRATION DATE: NORMAL
BLOOD UNIT TYPE CODE: 6200
BLOOD UNIT TYPE: NORMAL
BUN SERPL-MCNC: 12 MG/DL (ref 8–23)
BUN SERPL-MCNC: 13 MG/DL (ref 8–23)
BUN SERPL-MCNC: 15 MG/DL (ref 8–23)
BUN SERPL-MCNC: 20 MG/DL (ref 8–23)
BUN SERPL-MCNC: 21 MG/DL (ref 8–23)
CALCIUM SERPL-MCNC: 8.1 MG/DL (ref 8.7–10.5)
CALCIUM SERPL-MCNC: 8.1 MG/DL (ref 8.7–10.5)
CALCIUM SERPL-MCNC: 8.2 MG/DL (ref 8.7–10.5)
CALCIUM SERPL-MCNC: 8.3 MG/DL (ref 8.7–10.5)
CALCIUM SERPL-MCNC: 8.3 MG/DL (ref 8.7–10.5)
CHLORIDE SERPL-SCNC: 105 MMOL/L (ref 95–110)
CHLORIDE SERPL-SCNC: 105 MMOL/L (ref 95–110)
CHLORIDE SERPL-SCNC: 106 MMOL/L (ref 95–110)
CO2 SERPL-SCNC: 19 MMOL/L (ref 23–29)
CO2 SERPL-SCNC: 20 MMOL/L (ref 23–29)
CO2 SERPL-SCNC: 21 MMOL/L (ref 23–29)
CODING SYSTEM: NORMAL
CREAT SERPL-MCNC: 2 MG/DL (ref 0.5–1.4)
CREAT SERPL-MCNC: 2.1 MG/DL (ref 0.5–1.4)
CREAT SERPL-MCNC: 2.3 MG/DL (ref 0.5–1.4)
CREAT SERPL-MCNC: 3.1 MG/DL (ref 0.5–1.4)
CREAT SERPL-MCNC: 3.1 MG/DL (ref 0.5–1.4)
DELSYS: ABNORMAL
DIFFERENTIAL METHOD: ABNORMAL
DISPENSE STATUS: NORMAL
EOSINOPHIL # BLD AUTO: 0 K/UL (ref 0–0.5)
EOSINOPHIL NFR BLD: 0 % (ref 0–8)
ERYTHROCYTE [DISTWIDTH] IN BLOOD BY AUTOMATED COUNT: 14.3 % (ref 11.5–14.5)
ERYTHROCYTE [SEDIMENTATION RATE] IN BLOOD BY WESTERGREN METHOD: 20 MM/H
EST. GFR  (AFRICAN AMERICAN): 16 ML/MIN/1.73 M^2
EST. GFR  (AFRICAN AMERICAN): 16 ML/MIN/1.73 M^2
EST. GFR  (AFRICAN AMERICAN): 23 ML/MIN/1.73 M^2
EST. GFR  (AFRICAN AMERICAN): 26 ML/MIN/1.73 M^2
EST. GFR  (AFRICAN AMERICAN): 28 ML/MIN/1.73 M^2
EST. GFR  (NON AFRICAN AMERICAN): 14 ML/MIN/1.73 M^2
EST. GFR  (NON AFRICAN AMERICAN): 14 ML/MIN/1.73 M^2
EST. GFR  (NON AFRICAN AMERICAN): 20 ML/MIN/1.73 M^2
EST. GFR  (NON AFRICAN AMERICAN): 23 ML/MIN/1.73 M^2
EST. GFR  (NON AFRICAN AMERICAN): 24 ML/MIN/1.73 M^2
FIBRINOGEN PPP-MCNC: 176 MG/DL (ref 182–400)
FIBRINOGEN PPP-MCNC: 176 MG/DL (ref 182–400)
FIBRINOGEN PPP-MCNC: 397 MG/DL (ref 182–400)
FIO2: 40
GLUCOSE SERPL-MCNC: 102 MG/DL (ref 70–110)
GLUCOSE SERPL-MCNC: 111 MG/DL (ref 70–110)
GLUCOSE SERPL-MCNC: 112 MG/DL (ref 70–110)
GLUCOSE SERPL-MCNC: 135 MG/DL (ref 70–110)
GLUCOSE SERPL-MCNC: 201 MG/DL (ref 70–110)
HCO3 UR-SCNC: 20.9 MMOL/L (ref 24–28)
HCT VFR BLD AUTO: 12 % (ref 37–48.5)
HCT VFR BLD AUTO: 27.4 % (ref 37–48.5)
HGB BLD-MCNC: 4.3 G/DL (ref 12–16)
HGB BLD-MCNC: 9.9 G/DL (ref 12–16)
IMM GRANULOCYTES # BLD AUTO: 0.02 K/UL (ref 0–0.04)
IMM GRANULOCYTES NFR BLD AUTO: 0.2 % (ref 0–0.5)
INR PPP: 1.3 (ref 0.8–1.2)
LYMPHOCYTES # BLD AUTO: 0.8 K/UL (ref 1–4.8)
LYMPHOCYTES NFR BLD: 9.3 % (ref 18–48)
MAGNESIUM SERPL-MCNC: 2 MG/DL (ref 1.6–2.6)
MAGNESIUM SERPL-MCNC: 2 MG/DL (ref 1.6–2.6)
MAGNESIUM SERPL-MCNC: 2.1 MG/DL (ref 1.6–2.6)
MAGNESIUM SERPL-MCNC: 2.1 MG/DL (ref 1.6–2.6)
MAGNESIUM SERPL-MCNC: 2.2 MG/DL (ref 1.6–2.6)
MCH RBC QN AUTO: 30.4 PG (ref 27–31)
MCHC RBC AUTO-ENTMCNC: 36.1 G/DL (ref 32–36)
MCV RBC AUTO: 84 FL (ref 82–98)
METHEMOGLOBIN: 0.3 % (ref 0–3)
MODE: ABNORMAL
MONOCYTES # BLD AUTO: 1.3 K/UL (ref 0.3–1)
MONOCYTES NFR BLD: 14.5 % (ref 4–15)
MPV, BLUE TOP: 10.2 FL (ref 9.2–12.9)
NEUTROPHILS # BLD AUTO: 6.7 K/UL (ref 1.8–7.7)
NEUTROPHILS NFR BLD: 75.8 % (ref 38–73)
NRBC BLD-RTO: 0 /100 WBC
NUM UNITS TRANS PACKED RBC: NORMAL
PCO2 BLDA: 33.4 MMHG (ref 35–45)
PEEP: 5
PH SMN: 7.4 [PH] (ref 7.35–7.45)
PHOSPHATE SERPL-MCNC: 1.9 MG/DL (ref 2.7–4.5)
PHOSPHATE SERPL-MCNC: 2.5 MG/DL (ref 2.7–4.5)
PLATELET # BLD AUTO: 65 K/UL (ref 150–450)
PLATELET BLD QL SMEAR: ABNORMAL
PLATELET, BLUE TOP: 103 K/UL (ref 150–450)
PMV BLD AUTO: 10.4 FL (ref 9.2–12.9)
PO2 BLDA: 160 MMHG (ref 80–100)
POC BE: -4 MMOL/L
POC SATURATED O2: 99 % (ref 95–100)
POCT GLUCOSE: 101 MG/DL (ref 70–110)
POCT GLUCOSE: 102 MG/DL (ref 70–110)
POCT GLUCOSE: 121 MG/DL (ref 70–110)
POCT GLUCOSE: 121 MG/DL (ref 70–110)
POCT GLUCOSE: 122 MG/DL (ref 70–110)
POCT GLUCOSE: 122 MG/DL (ref 70–110)
POCT GLUCOSE: 123 MG/DL (ref 70–110)
POCT GLUCOSE: 134 MG/DL (ref 70–110)
POCT GLUCOSE: 139 MG/DL (ref 70–110)
POCT GLUCOSE: 142 MG/DL (ref 70–110)
POCT GLUCOSE: 150 MG/DL (ref 70–110)
POCT GLUCOSE: 157 MG/DL (ref 70–110)
POCT GLUCOSE: 174 MG/DL (ref 70–110)
POCT GLUCOSE: 201 MG/DL (ref 70–110)
POCT GLUCOSE: 239 MG/DL (ref 70–110)
POCT GLUCOSE: 301 MG/DL (ref 70–110)
POCT GLUCOSE: 302 MG/DL (ref 70–110)
POCT GLUCOSE: 327 MG/DL (ref 70–110)
POCT GLUCOSE: 338 MG/DL (ref 70–110)
POCT GLUCOSE: 339 MG/DL (ref 70–110)
POCT GLUCOSE: 366 MG/DL (ref 70–110)
POCT GLUCOSE: 98 MG/DL (ref 70–110)
POTASSIUM SERPL-SCNC: 3.8 MMOL/L (ref 3.5–5.1)
POTASSIUM SERPL-SCNC: 4.2 MMOL/L (ref 3.5–5.1)
POTASSIUM SERPL-SCNC: 4.6 MMOL/L (ref 3.5–5.1)
PROTHROMBIN TIME: 13.5 SEC (ref 9–12.5)
RBC # BLD AUTO: 3.26 M/UL (ref 4–5.4)
SAMPLE: ABNORMAL
SITE: ABNORMAL
SODIUM SERPL-SCNC: 136 MMOL/L (ref 136–145)
SODIUM SERPL-SCNC: 137 MMOL/L (ref 136–145)
SODIUM SERPL-SCNC: 138 MMOL/L (ref 136–145)
UNIT NUMBER: NORMAL
UNIT NUMBER: NORMAL
VT: 340
WBC # BLD AUTO: 8.9 K/UL (ref 3.9–12.7)

## 2022-07-21 PROCEDURE — 99900035 HC TECH TIME PER 15 MIN (STAT)

## 2022-07-21 PROCEDURE — 90937 HEMODIALYSIS REPEATED EVAL: CPT | Mod: ,,, | Performed by: INTERNAL MEDICINE

## 2022-07-21 PROCEDURE — 80069 RENAL FUNCTION PANEL: CPT | Mod: 91 | Performed by: INTERNAL MEDICINE

## 2022-07-21 PROCEDURE — 80048 BASIC METABOLIC PNL TOTAL CA: CPT | Performed by: NURSE PRACTITIONER

## 2022-07-21 PROCEDURE — 63600175 PHARM REV CODE 636 W HCPCS

## 2022-07-21 PROCEDURE — 25000003 PHARM REV CODE 250: Performed by: INTERNAL MEDICINE

## 2022-07-21 PROCEDURE — 80048 BASIC METABOLIC PNL TOTAL CA: CPT | Mod: 91 | Performed by: THORACIC SURGERY (CARDIOTHORACIC VASCULAR SURGERY)

## 2022-07-21 PROCEDURE — 83735 ASSAY OF MAGNESIUM: CPT | Mod: 91 | Performed by: THORACIC SURGERY (CARDIOTHORACIC VASCULAR SURGERY)

## 2022-07-21 PROCEDURE — P9035 PLATELET PHERES LEUKOREDUCED: HCPCS | Performed by: THORACIC SURGERY (CARDIOTHORACIC VASCULAR SURGERY)

## 2022-07-21 PROCEDURE — P9016 RBC LEUKOCYTES REDUCED: HCPCS | Performed by: THORACIC SURGERY (CARDIOTHORACIC VASCULAR SURGERY)

## 2022-07-21 PROCEDURE — 90945 DIALYSIS ONE EVALUATION: CPT

## 2022-07-21 PROCEDURE — 25000003 PHARM REV CODE 250: Performed by: THORACIC SURGERY (CARDIOTHORACIC VASCULAR SURGERY)

## 2022-07-21 PROCEDURE — 85384 FIBRINOGEN ACTIVITY: CPT | Performed by: THORACIC SURGERY (CARDIOTHORACIC VASCULAR SURGERY)

## 2022-07-21 PROCEDURE — 25000242 PHARM REV CODE 250 ALT 637 W/ HCPCS: Performed by: THORACIC SURGERY (CARDIOTHORACIC VASCULAR SURGERY)

## 2022-07-21 PROCEDURE — C9399 UNCLASSIFIED DRUGS OR BIOLOG: HCPCS

## 2022-07-21 PROCEDURE — 99232 SBSQ HOSP IP/OBS MODERATE 35: CPT | Mod: ,,, | Performed by: INTERNAL MEDICINE

## 2022-07-21 PROCEDURE — 37799 UNLISTED PX VASCULAR SURGERY: CPT

## 2022-07-21 PROCEDURE — 63600175 PHARM REV CODE 636 W HCPCS: Performed by: THORACIC SURGERY (CARDIOTHORACIC VASCULAR SURGERY)

## 2022-07-21 PROCEDURE — 20000000 HC ICU ROOM

## 2022-07-21 PROCEDURE — 99900026 HC AIRWAY MAINTENANCE (STAT)

## 2022-07-21 PROCEDURE — 85730 THROMBOPLASTIN TIME PARTIAL: CPT | Performed by: THORACIC SURGERY (CARDIOTHORACIC VASCULAR SURGERY)

## 2022-07-21 PROCEDURE — 99291 CRITICAL CARE FIRST HOUR: CPT | Mod: 25,,, | Performed by: INTERNAL MEDICINE

## 2022-07-21 PROCEDURE — 85049 AUTOMATED PLATELET COUNT: CPT | Performed by: THORACIC SURGERY (CARDIOTHORACIC VASCULAR SURGERY)

## 2022-07-21 PROCEDURE — 83050 HGB METHEMOGLOBIN QUAN: CPT

## 2022-07-21 PROCEDURE — 27201598 HC CASSETTE, BLOOD WARMER

## 2022-07-21 PROCEDURE — 84100 ASSAY OF PHOSPHORUS: CPT | Performed by: INTERNAL MEDICINE

## 2022-07-21 PROCEDURE — 25000003 PHARM REV CODE 250

## 2022-07-21 PROCEDURE — 82803 BLOOD GASES ANY COMBINATION: CPT

## 2022-07-21 PROCEDURE — 85025 COMPLETE CBC W/AUTO DIFF WBC: CPT | Performed by: THORACIC SURGERY (CARDIOTHORACIC VASCULAR SURGERY)

## 2022-07-21 PROCEDURE — C9399 UNCLASSIFIED DRUGS OR BIOLOG: HCPCS | Performed by: THORACIC SURGERY (CARDIOTHORACIC VASCULAR SURGERY)

## 2022-07-21 PROCEDURE — 90937 PR HEMODIALYSIS, REPEATED EVAL.: ICD-10-PCS | Mod: ,,, | Performed by: INTERNAL MEDICINE

## 2022-07-21 PROCEDURE — 99232 PR SUBSEQUENT HOSPITAL CARE,LEVL II: ICD-10-PCS | Mod: ,,, | Performed by: INTERNAL MEDICINE

## 2022-07-21 PROCEDURE — 99291 PR CRITICAL CARE, E/M 30-74 MINUTES: ICD-10-PCS | Mod: ,,,

## 2022-07-21 PROCEDURE — 94003 VENT MGMT INPAT SUBQ DAY: CPT

## 2022-07-21 PROCEDURE — 99291 CRITICAL CARE FIRST HOUR: CPT | Mod: ,,,

## 2022-07-21 PROCEDURE — 63600367 HC NITRIC OXIDE PER HOUR

## 2022-07-21 PROCEDURE — 85610 PROTHROMBIN TIME: CPT | Performed by: THORACIC SURGERY (CARDIOTHORACIC VASCULAR SURGERY)

## 2022-07-21 PROCEDURE — 85018 HEMOGLOBIN: CPT | Performed by: THORACIC SURGERY (CARDIOTHORACIC VASCULAR SURGERY)

## 2022-07-21 PROCEDURE — 83735 ASSAY OF MAGNESIUM: CPT | Performed by: THORACIC SURGERY (CARDIOTHORACIC VASCULAR SURGERY)

## 2022-07-21 PROCEDURE — 36430 TRANSFUSION BLD/BLD COMPNT: CPT

## 2022-07-21 PROCEDURE — 99291 PR CRITICAL CARE, E/M 30-74 MINUTES: ICD-10-PCS | Mod: 25,,, | Performed by: INTERNAL MEDICINE

## 2022-07-21 PROCEDURE — 80069 RENAL FUNCTION PANEL: CPT | Performed by: THORACIC SURGERY (CARDIOTHORACIC VASCULAR SURGERY)

## 2022-07-21 PROCEDURE — 85014 HEMATOCRIT: CPT | Performed by: THORACIC SURGERY (CARDIOTHORACIC VASCULAR SURGERY)

## 2022-07-21 PROCEDURE — P9012 CRYOPRECIPITATE EACH UNIT: HCPCS | Performed by: THORACIC SURGERY (CARDIOTHORACIC VASCULAR SURGERY)

## 2022-07-21 PROCEDURE — P9045 ALBUMIN (HUMAN), 5%, 250 ML: HCPCS | Mod: JG | Performed by: THORACIC SURGERY (CARDIOTHORACIC VASCULAR SURGERY)

## 2022-07-21 PROCEDURE — 94640 AIRWAY INHALATION TREATMENT: CPT

## 2022-07-21 PROCEDURE — 83735 ASSAY OF MAGNESIUM: CPT | Mod: 91 | Performed by: INTERNAL MEDICINE

## 2022-07-21 RX ORDER — MUPIROCIN 20 MG/G
OINTMENT TOPICAL 2 TIMES DAILY
Status: DISPENSED | OUTPATIENT
Start: 2022-07-21 | End: 2022-07-26

## 2022-07-21 RX ORDER — HYDROCODONE BITARTRATE AND ACETAMINOPHEN 500; 5 MG/1; MG/1
TABLET ORAL
Status: DISCONTINUED | OUTPATIENT
Start: 2022-07-21 | End: 2022-07-21

## 2022-07-21 RX ORDER — INSULIN ASPART 100 [IU]/ML
1-10 INJECTION, SOLUTION INTRAVENOUS; SUBCUTANEOUS EVERY 6 HOURS PRN
Status: DISCONTINUED | OUTPATIENT
Start: 2022-07-21 | End: 2022-07-27 | Stop reason: HOSPADM

## 2022-07-21 RX ORDER — HYDROCODONE BITARTRATE AND ACETAMINOPHEN 500; 5 MG/1; MG/1
TABLET ORAL CONTINUOUS
Status: ACTIVE | OUTPATIENT
Start: 2022-07-21 | End: 2022-07-22

## 2022-07-21 RX ORDER — MAGNESIUM SULFATE HEPTAHYDRATE 40 MG/ML
2 INJECTION, SOLUTION INTRAVENOUS
Status: DISCONTINUED | OUTPATIENT
Start: 2022-07-21 | End: 2022-07-22

## 2022-07-21 RX ORDER — GLUCAGON 1 MG
1 KIT INJECTION
Status: DISCONTINUED | OUTPATIENT
Start: 2022-07-21 | End: 2022-07-27 | Stop reason: HOSPADM

## 2022-07-21 RX ADMIN — SODIUM CHLORIDE: 0.9 INJECTION, SOLUTION INTRAVENOUS at 03:07

## 2022-07-21 RX ADMIN — ALBUMIN (HUMAN) 25 G: 2.5 SOLUTION INTRAVENOUS at 01:07

## 2022-07-21 RX ADMIN — VANCOMYCIN HYDROCHLORIDE 1000 MG: 1 INJECTION, POWDER, FOR SOLUTION INTRAVENOUS at 08:07

## 2022-07-21 RX ADMIN — SODIUM CHLORIDE 3 UNITS/HR: 9 INJECTION, SOLUTION INTRAVENOUS at 01:07

## 2022-07-21 RX ADMIN — OXYCODONE HYDROCHLORIDE AND ACETAMINOPHEN 500 MG: 500 TABLET ORAL at 08:07

## 2022-07-21 RX ADMIN — EPINEPHRINE 0.18 MCG/KG/MIN: 1 INJECTION INTRAMUSCULAR; INTRAVENOUS; SUBCUTANEOUS at 02:07

## 2022-07-21 RX ADMIN — EPINEPHRINE 0.16 MCG/KG/MIN: 1 INJECTION INTRAMUSCULAR; INTRAVENOUS; SUBCUTANEOUS at 09:07

## 2022-07-21 RX ADMIN — INSULIN ASPART 2 UNITS: 100 INJECTION, SOLUTION INTRAVENOUS; SUBCUTANEOUS at 11:07

## 2022-07-21 RX ADMIN — MILRINONE LACTATE IN DEXTROSE 0.38 MCG/KG/MIN: 200 INJECTION, SOLUTION INTRAVENOUS at 12:07

## 2022-07-21 RX ADMIN — NEPHROCAP 1 CAPSULE: 1 CAP ORAL at 08:07

## 2022-07-21 RX ADMIN — METOPROLOL TARTRATE 25 MG: 25 TABLET, FILM COATED ORAL at 09:07

## 2022-07-21 RX ADMIN — CEFAZOLIN SODIUM 1 G: 1 SOLUTION INTRAVENOUS at 11:07

## 2022-07-21 RX ADMIN — INSULIN DETEMIR 9 UNITS: 100 INJECTION, SOLUTION SUBCUTANEOUS at 05:07

## 2022-07-21 RX ADMIN — VASOPRESSIN 0.04 UNITS/MIN: 20 INJECTION INTRAVENOUS at 04:07

## 2022-07-21 RX ADMIN — DEXMEDETOMIDINE HYDROCHLORIDE 0.6 MCG/KG/HR: 4 INJECTION INTRAVENOUS at 08:07

## 2022-07-21 RX ADMIN — METOPROLOL TARTRATE 25 MG: 25 TABLET, FILM COATED ORAL at 08:07

## 2022-07-21 RX ADMIN — DEXMEDETOMIDINE HYDROCHLORIDE 0.5 MCG/KG/HR: 4 INJECTION INTRAVENOUS at 07:07

## 2022-07-21 RX ADMIN — ASPIRIN 81 MG CHEWABLE TABLET 81 MG: 81 TABLET CHEWABLE at 08:07

## 2022-07-21 RX ADMIN — IPRATROPIUM BROMIDE AND ALBUTEROL SULFATE 3 ML: 2.5; .5 SOLUTION RESPIRATORY (INHALATION) at 12:07

## 2022-07-21 RX ADMIN — IPRATROPIUM BROMIDE AND ALBUTEROL SULFATE 3 ML: 2.5; .5 SOLUTION RESPIRATORY (INHALATION) at 07:07

## 2022-07-21 RX ADMIN — IPRATROPIUM BROMIDE AND ALBUTEROL SULFATE 3 ML: 2.5; .5 SOLUTION RESPIRATORY (INHALATION) at 11:07

## 2022-07-21 RX ADMIN — SODIUM CHLORIDE: 0.9 INJECTION, SOLUTION INTRAVENOUS at 10:07

## 2022-07-21 RX ADMIN — CHLORHEXIDINE GLUCONATE 0.12% ORAL RINSE 10 ML: 1.2 LIQUID ORAL at 09:07

## 2022-07-21 RX ADMIN — MAGNESIUM HYDROXIDE 2400 MG: 400 SUSPENSION ORAL at 05:07

## 2022-07-21 RX ADMIN — IPRATROPIUM BROMIDE AND ALBUTEROL SULFATE 3 ML: 2.5; .5 SOLUTION RESPIRATORY (INHALATION) at 03:07

## 2022-07-21 RX ADMIN — POLYETHYLENE GLYCOL 3350 17 G: 17 POWDER, FOR SOLUTION ORAL at 08:07

## 2022-07-21 RX ADMIN — FAMOTIDINE 20 MG: 20 TABLET ORAL at 08:07

## 2022-07-21 RX ADMIN — DEXMEDETOMIDINE HYDROCHLORIDE 0.9 MCG/KG/HR: 4 INJECTION INTRAVENOUS at 12:07

## 2022-07-21 RX ADMIN — MUPIROCIN: 20 OINTMENT TOPICAL at 09:07

## 2022-07-21 RX ADMIN — CHLORHEXIDINE GLUCONATE 0.12% ORAL RINSE 10 ML: 1.2 LIQUID ORAL at 08:07

## 2022-07-21 RX ADMIN — EPINEPHRINE 0.14 MCG/KG/MIN: 1 INJECTION INTRAMUSCULAR; INTRAVENOUS; SUBCUTANEOUS at 12:07

## 2022-07-21 NOTE — PROGRESS NOTES
[facility] - Intensive Care ([facility])  Critical Care Medicine  Progress Note    Patient Name: Avril Monzon  MRN: [id number]  Admission Date: 7/12/2022  Hospital Length of Stay: 9 days  Code Status: Full Code  Attending Provider: Sanju Locke MD  Primary Care Provider: Rose Parks MD   Principal Problem: S/P CABG (coronary artery bypass graft)    Subjective:     HPI:  77W pmh ESRD, DM, CAD s/p stent, HFpEF (Gr2 DD), HLD, HTN, NSTEMI, seizure, tobacco dependence, and CVA presents with NSTEMI .  Her hospital course has been complicated by DKA.    In ED, EKG with multiple leads with ST depressions.  Initial Troponin approx 12.  She is undergoing HD for volume mgmt.   She is on the DKA protocol with insulin drip, fluids and serial electrolytes.      Hospital/ICU Course:  7/12: Aspirin.  Heparin drip for NSTEMI.  Insulin drip for DKA.  Undergoing HD.  7/20: Day 0: CABG x2. Arrives to ICU intubated. 2 PRBCs received intra-op. Currently on milrinone, epi, & nitric oxide @ 40ppm.   7/21: Day 1: CABG x 2. Remains intubated. Awakens to physical stimuli and follows commands. Increased output from chest tubes, cryo administered overnight. 1/3 PRBCs currently infusing. Rpt labs pending. Nitric @ 18ppm. Remains on Epi, vaso, milronone. Currently on CRRT       Review of Systems   Unable to perform ROS: Intubated      Objective:     Vital Signs (Most Recent):  Temp: (!) 100.4 °F (38 °C) (07/21/22 1103)  Pulse: 101 (07/21/22 1103)  Resp: (!) 24 (07/21/22 1103)  BP: (!) 145/64 (07/21/22 1103)  SpO2: 100 % (07/21/22 1103)   Vital Signs (24h Range):  Temp:  [92.3 °F (33.5 °C)-100.76 °F (38.2 °C)] 100.4 °F (38 °C)  Pulse:  [] 101  Resp:  [0-29] 24  SpO2:  [87 %-100 %] 100 %  BP: ()/(30-67) 145/64  Arterial Line BP: ()/() 128/42     Weight: 66.2 kg (145 lb 15.1 oz)  Body mass index is 24.29 kg/m².      Intake/Output Summary (Last 24 hours) at 7/21/2022 1108  Last data filed at 7/21/2022 1000  Gross per 24  hour   Intake 23081.84 ml   Output 3525 ml   Net 81011.84 ml       Physical Exam  Vitals and nursing note reviewed.   Constitutional:       General: She is not in acute distress.     Appearance: She is ill-appearing.      Interventions: She is sedated, intubated and restrained.      Comments: Florinda hugger in place   Cardiovascular:      Rate and Rhythm: Normal rate and regular rhythm. No extrasystoles are present.     Pulses:           Radial pulses are 2+ on the right side and 2+ on the left side.        Dorsalis pedis pulses are 2+ on the right side and 2+ on the left side.      Arteriovenous access: Left arteriovenous access is present.     Comments: AV paced  Pulmonary:      Effort: No tachypnea. She is intubated.      Comments: Coarse mechanically ventilated breath sounds  Chest:       Abdominal:      General: There is no distension.   Genitourinary:     Comments: Pfeiffer catheter ~150ml  Musculoskeletal:      Right lower leg: No edema.      Left lower leg: No edema.   Skin:     General: Skin is warm and dry.      Capillary Refill: Capillary refill takes 2 to 3 seconds.   Neurological:      Comments: Awakens to verbal stimuli       Vents:  Vent Mode: A/C (07/21/22 1103)  Ventilator Initiated: Yes (07/20/22 1409)  Set Rate: 20 BPM (07/21/22 1103)  Vt Set: 375 mL (07/21/22 1103)  Pressure Support: 0 cmH20 (07/21/22 1103)  PEEP/CPAP: 5 cmH20 (07/21/22 1103)  Oxygen Concentration (%): 30 (07/21/22 1103)  Peak Airway Pressure: 23 cmH2O (07/21/22 1103)  Plateau Pressure: 18 cmH20 (07/21/22 1103)  Total Ve: 10.1 mL (07/21/22 1103)  F/VT Ratio<105 (RSBI): (!) 52.98 (07/21/22 1103)    Lines/Drains/Airways       Central Venous Catheter Line  Duration                  Hemodialysis Catheter 07/20/22 1348 right femoral <1 day     Introducer with Double Lumen 07/20/22 1400 right internal jugular <1 day    Pulmonary Artery Catheter Assessment  07/20/22 1400 <1 day              Drain  Duration                  Hemodialysis AV  Fistula 04/01/20 2203 Left upper arm 840 days         Urethral Catheter 07/20/22 0740 Silicone;Temperature probe;Straight-tip 16 Fr. 1 day         Closed/Suction Drain 07/20/22 1154 Left Leg Bulb 19 Fr. <1 day         NG/OG Tube 07/20/22 1445 Center mouth <1 day         Y Chest Tube 1 and 2 07/20/22 1204 1 Right Mediastinal 24 Fr. 2 Left Mediastinal 24 Fr. <1 day         Y Chest Tube 3 and 4 07/20/22 1205 3 Left Pleural 24 Fr. 4 Right Pleural 24 Fr. <1 day              Airway  Duration                  Airway - Non-Surgical 07/20/22 1 day              Arterial Line  Duration             Arterial Line 07/20/22 0712 Right Radial 1 day              Line  Duration                  Pacer Wires 07/20/22 1400 <1 day              Peripheral Intravenous Line  Duration                  Peripheral IV - Single Lumen 07/18/22 0625 20 G Posterior;Right Wrist 3 days                    Significant Labs:    CBC/Anemia Profile:  Recent Labs   Lab 07/20/22  0556 07/20/22  0809 07/20/22  1435 07/20/22  1437 07/21/22  0446   WBC 4.97  --  15.94*  --   --    HGB 11.1*  --  10.9*  --  4.3*   HCT 33.5*   < > 31.7* 30* 12.0*     --  210  --   --    MCV 91  --  88  --   --    RDW 16.0*  --  16.3*  --   --     < > = values in this interval not displayed.        Chemistries:  Recent Labs   Lab 07/19/22 2127 07/20/22  0556 07/20/22  1435 07/20/22  1714 07/20/22  2052 07/20/22  2059 07/21/22  0017 07/21/22  0406 07/21/22  0911    134*   < > 134* 137  --   --  138  136  --    K 3.9 4.4   < > 3.5 3.3*  --   --  3.8  3.8  --    CL 99 99   < > 100 104  --   --  106  106  --    CO2 23 25   < > 17* 15*  --   --  19*  19*  --    BUN 24* 30*   < > 35* 27*  --   --  20  21  --    CREATININE 5.1* 5.8*   < > 5.5* 4.4*  --   --  3.1*  3.1*  --    CALCIUM 9.0 8.7   < > 8.6* 8.4*  --   --  8.3*  8.2*  --    ALBUMIN 3.2*  --   --   --  4.2  --   --  3.6  --    PROT 6.3  --   --   --   --   --   --   --   --    BILITOT 0.6  --   --   --    --   --   --   --   --    ALKPHOS 92  --   --   --   --   --   --   --   --    ALT 17  --   --   --   --   --   --   --   --    AST 15  --   --   --   --   --   --   --   --    MG  --   --    < > 1.7  --  2.3 2.2  --  2.0  2.0   PHOS  --  4.8*  --   --  2.5*  --   --  1.9*  1.9*  --     < > = values in this interval not displayed.       Coagulation:   Recent Labs   Lab 07/21/22  0406   INR 1.3*   APTT 39.8*       POCT Glucose:   Recent Labs   Lab 07/21/22  0831 07/21/22  0917 07/21/22  1027   POCTGLUCOSE 122* 121* 150*       Significant Imaging:  I have reviewed all pertinent imaging results/findings within the past 24 hours.      ABG  Recent Labs   Lab 07/21/22  0440   PH 7.404   PO2 160*   PCO2 33.4*   HCO3 20.9*   BE -4     Assessment/Plan:     Pulmonary  On mechanically assisted ventilation  - per CTS mgmt, will assist   - Intubated 7/20  - remains on nitro and two vasopressors, will attempt to wean off   - VAP prophylaxis  - daily SAT/SBT   - vent settings reviewed for optimal ventilation    Pulmonary hypertension  - CTS managing  - PA pressure >70mmHg intraop  - ~40-50mmHg yesterday and today likely s/t low CRRT threshold and fluid administration  - wean off nitric slowly to maintain <50mmHg   - increase CRRT as tolerated    Cardiac/Vascular  * S/P CABG (coronary artery bypass graft)  - per CTS mgmt  - Day 1: s/p CABG to    1. pediciled LIMA to LAD   2. Saphenous vein graft to obtuse marginal  - pressors to maintain MAP >65, wean as clinically tolerated  - increased amt of CT drainage noted overnight, blood products administered per conservative transfusion protocol  - monitor CBC    Elevated troponin  Due to NSTEMI  See assessment and plan for NSTEMI    CAD (coronary artery disease)  - CTS mgmt  - Day 1: s/p CABG   - 7/13 LHC:  · The pre-procedure left ventricular end diastolic pressure was 31.  · The post-procedure left ventricular end diastolic pressure was 32.  · The ejection fraction was calculated to  be 40%.  · The Prox RCA to Mid RCA lesion was 100% stenosed.  · The Ost Cx to Prox Cx lesion was 99% stenosed.  · The Prox LAD to Mid LAD lesion was 75% stenosed.  · The estimated blood loss was none.  · There was three vessel coronary artery disease.    NSTEMI (non-ST elevated myocardial infarction)  - see s/p CABG assessment & plan    Renal/  Metabolic acidosis  - mild improvement in metabolic acidosis with   - cont to monitor serum CO2  - HD per nephrology mgmt        ESRD (end stage renal disease)  - renal following  - CRRT today with goal net of zero  - monitor BMP       Critical Care Daily Checklist:    A: Awake: RASS Goal/Actual Goal: RASS Goal: 0-->alert and calm  Actual: Lentz Agitation Sedation Scale (RASS): Alert and calm   B: Spontaneous Breathing Trial Performed?     C: SAT & SBT Coordinated?  S/p CABG                      D: Delirium: CAM-ICU Overall CAM-ICU: Negative   E: Early Mobility Performed? No- per CTS    F: Feeding Goal: Goals: Patient to tolerate cardiac, renal oral diet.  Status: Nutrition Goal Status: new   Current Diet Order   Procedures    Diet NPO Except for: Sips with Medication     Order Specific Question:   Except for     Answer:   Sips with Medication      AS: Analgesia/Sedation Reviewed- CTS mgmt   T: Thromboembolic Prophylaxis SCDs- CTS mgmt   H: HOB > 300 No   U: Stress Ulcer Prophylaxis (if needed) Famotodine- CTS mgmt   G: Glucose Control Insulin   B: Bowel Function Stool Occurrence: 1   I: Indwelling Catheter (Lines & Pfeiffer) Necessity Reviewed maintain for necessity   D: De-escalation of Antimicrobials/Pharmacotherapies Reviewed- CTS mgmt    Plan for the day/ETD Stabilize and monitor hemodynamics, wean off pressor support as tolerated    Code Status:  Family/Goals of Care: Full Code  Elizabeth (daughter) updated on plan of care   I have discussed case and plan of care in detail with Dr. Locke; Status and plan of care discussed and will further discuss with team on  multidisciplinary rounds.  Critical Care Time: 66 minutes  Critical secondary to Patient has a condition that poses threat to life and bodily function: s/p CABG, on mechanically assisted ventilation  Patient is currently on drug therapy requiring intensive monitoring for toxicity: epinephrine, insulin, milrinone      Critical care was time spent personally by me on the following activities: development of treatment plan with patient or surrogate and bedside caregivers, discussions with consultants, evaluation of patient's response to treatment, examination of patient, ordering and performing treatments and interventions, ordering and review of laboratory studies, ordering and review of radiographic studies, pulse oximetry, re-evaluation of patient's condition. This critical care time did not overlap with that of any other provider or involve time for any procedures.     Loren Stevenson NP  Critical Care Medicine  [facility] - Intensive Care ([facility])

## 2022-07-21 NOTE — ASSESSMENT & PLAN NOTE
- CTS managing  - PA pressure >70mmHg intraop  - ~40-50mmHg yesterday and today likely s/t low CRRT threshold and fluid administration  - wean off nitric slowly to maintain <50mmHg   - increase CRRT as tolerated

## 2022-07-21 NOTE — PROGRESS NOTES
0445-Lab called with critical H/H of 4.3/13.1.  Will redraw to verify accuracy prior to notifying MD. H/H redrawn and sent to lab for STAT processing.    0455- Lab called with new critical H/H values of 4.3/12.  Will notify MD

## 2022-07-21 NOTE — PROGRESS NOTES
Due to issues with hypothermia this shift, CHG bath to be done in small increments.  Patient's back and linen change was first task.  Was uncovered less than 5 minutes and went from a core rectal temp of 100 (with Florinda Hugger on) to 93.4 rectally.  Florinda Hugger reapplied and towels placed over head.  Will continue CHG bath once core temp rises to an acceptable temp.

## 2022-07-21 NOTE — ASSESSMENT & PLAN NOTE
- per CTS mgmt  - Day 1: s/p CABG to    1. pediciled LIMA to LAD   2. Saphenous vein graft to obtuse marginal  - pressors to maintain MAP >65, wean as clinically tolerated  - increased amt of CT drainage noted overnight, blood products administered per conservative transfusion protocol  - monitor CBC

## 2022-07-21 NOTE — PROGRESS NOTES
Dr. Galvan notified of Fibrinogen dropping from 290 to 165, PT/INR results and inability to run aPTT due to Heparin at 200u/hr per CRRT protocol.  Orders received to stop heparin on CRRT & to administer 1 u Cryo at this time.  Informed of total chest tube output since leaving OR.  Pleural CT with 980 in atrium.  Also informed of persistent hypothermia (see flow sheets) Heparin stopped per MD directive.  Florinda Hugger on  at 43 degrees celcius due to hypothermia.

## 2022-07-21 NOTE — SUBJECTIVE & OBJECTIVE
Review of Systems   Unable to perform ROS: Intubated      Objective:     Vital Signs (Most Recent):  Temp: (!) 100.4 °F (38 °C) (07/21/22 1103)  Pulse: 101 (07/21/22 1103)  Resp: (!) 24 (07/21/22 1103)  BP: (!) 145/64 (07/21/22 1103)  SpO2: 100 % (07/21/22 1103)   Vital Signs (24h Range):  Temp:  [92.3 °F (33.5 °C)-100.76 °F (38.2 °C)] 100.4 °F (38 °C)  Pulse:  [] 101  Resp:  [0-29] 24  SpO2:  [87 %-100 %] 100 %  BP: ()/(30-67) 145/64  Arterial Line BP: ()/() 128/42     Weight: 66.2 kg (145 lb 15.1 oz)  Body mass index is 24.29 kg/m².      Intake/Output Summary (Last 24 hours) at 7/21/2022 1108  Last data filed at 7/21/2022 1000  Gross per 24 hour   Intake 31089.84 ml   Output 3525 ml   Net 12311.84 ml       Physical Exam  Vitals and nursing note reviewed.   Constitutional:       General: She is not in acute distress.     Appearance: She is ill-appearing.      Interventions: She is sedated, intubated and restrained.      Comments: Florinda hugger in place   Cardiovascular:      Rate and Rhythm: Normal rate and regular rhythm. No extrasystoles are present.     Pulses:           Radial pulses are 2+ on the right side and 2+ on the left side.        Dorsalis pedis pulses are 2+ on the right side and 2+ on the left side.      Arteriovenous access: Left arteriovenous access is present.     Comments: AV paced  Pulmonary:      Effort: No tachypnea. She is intubated.      Comments: Coarse mechanically ventilated breath sounds  Chest:       Abdominal:      General: There is no distension.   Genitourinary:     Comments: Pfeiffer catheter ~150ml  Musculoskeletal:      Right lower leg: No edema.      Left lower leg: No edema.   Skin:     General: Skin is warm and dry.      Capillary Refill: Capillary refill takes 2 to 3 seconds.   Neurological:      Comments: Awakens to verbal stimuli       Vents:  Vent Mode: A/C (07/21/22 1103)  Ventilator Initiated: Yes (07/20/22 1409)  Set Rate: 20 BPM (07/21/22 1103)  Vt  Set: 375 mL (07/21/22 1103)  Pressure Support: 0 cmH20 (07/21/22 1103)  PEEP/CPAP: 5 cmH20 (07/21/22 1103)  Oxygen Concentration (%): 30 (07/21/22 1103)  Peak Airway Pressure: 23 cmH2O (07/21/22 1103)  Plateau Pressure: 18 cmH20 (07/21/22 1103)  Total Ve: 10.1 mL (07/21/22 1103)  F/VT Ratio<105 (RSBI): (!) 52.98 (07/21/22 1103)    Lines/Drains/Airways       Central Venous Catheter Line  Duration                  Hemodialysis Catheter 07/20/22 1348 right femoral <1 day     Introducer with Double Lumen 07/20/22 1400 right internal jugular <1 day    Pulmonary Artery Catheter Assessment  07/20/22 1400 <1 day              Drain  Duration                  Hemodialysis AV Fistula 04/01/20 2203 Left upper arm 840 days         Urethral Catheter 07/20/22 0740 Silicone;Temperature probe;Straight-tip 16 Fr. 1 day         Closed/Suction Drain 07/20/22 1154 Left Leg Bulb 19 Fr. <1 day         NG/OG Tube 07/20/22 1445 Center mouth <1 day         Y Chest Tube 1 and 2 07/20/22 1204 1 Right Mediastinal 24 Fr. 2 Left Mediastinal 24 Fr. <1 day         Y Chest Tube 3 and 4 07/20/22 1205 3 Left Pleural 24 Fr. 4 Right Pleural 24 Fr. <1 day              Airway  Duration                  Airway - Non-Surgical 07/20/22 1 day              Arterial Line  Duration             Arterial Line 07/20/22 0712 Right Radial 1 day              Line  Duration                  Pacer Wires 07/20/22 1400 <1 day              Peripheral Intravenous Line  Duration                  Peripheral IV - Single Lumen 07/18/22 0625 20 G Posterior;Right Wrist 3 days                    Significant Labs:    CBC/Anemia Profile:  Recent Labs   Lab 07/20/22  0556 07/20/22  0809 07/20/22  1435 07/20/22  1437 07/21/22  0446   WBC 4.97  --  15.94*  --   --    HGB 11.1*  --  10.9*  --  4.3*   HCT 33.5*   < > 31.7* 30* 12.0*     --  210  --   --    MCV 91  --  88  --   --    RDW 16.0*  --  16.3*  --   --     < > = values in this interval not displayed.         Chemistries:  Recent Labs   Lab 07/19/22 2127 07/20/22  0556 07/20/22  1435 07/20/22  1714 07/20/22 2052 07/20/22 2059 07/21/22  0017 07/21/22  0406 07/21/22  0911    134*   < > 134* 137  --   --  138  136  --    K 3.9 4.4   < > 3.5 3.3*  --   --  3.8  3.8  --    CL 99 99   < > 100 104  --   --  106  106  --    CO2 23 25   < > 17* 15*  --   --  19*  19*  --    BUN 24* 30*   < > 35* 27*  --   --  20  21  --    CREATININE 5.1* 5.8*   < > 5.5* 4.4*  --   --  3.1*  3.1*  --    CALCIUM 9.0 8.7   < > 8.6* 8.4*  --   --  8.3*  8.2*  --    ALBUMIN 3.2*  --   --   --  4.2  --   --  3.6  --    PROT 6.3  --   --   --   --   --   --   --   --    BILITOT 0.6  --   --   --   --   --   --   --   --    ALKPHOS 92  --   --   --   --   --   --   --   --    ALT 17  --   --   --   --   --   --   --   --    AST 15  --   --   --   --   --   --   --   --    MG  --   --    < > 1.7  --  2.3 2.2  --  2.0  2.0   PHOS  --  4.8*  --   --  2.5*  --   --  1.9*  1.9*  --     < > = values in this interval not displayed.       Coagulation:   Recent Labs   Lab 07/21/22  0406   INR 1.3*   APTT 39.8*       POCT Glucose:   Recent Labs   Lab 07/21/22  0831 07/21/22  0917 07/21/22  1027   POCTGLUCOSE 122* 121* 150*       Significant Imaging:  I have reviewed all pertinent imaging results/findings within the past 24 hours.

## 2022-07-21 NOTE — SUBJECTIVE & OBJECTIVE
ROSnot obtained given acuity of condition  Objective:     Vital Signs (Most Recent):  Temp: 100.22 °F (37.9 °C) (07/21/22 1215)  Pulse: 99 (07/21/22 1215)  Resp: (!) 22 (07/21/22 1215)  BP: (!) 148/65 (07/21/22 1200)  SpO2: 100 % (07/21/22 1215)   Vital Signs (24h Range):  Temp:  [92.3 °F (33.5 °C)-100.76 °F (38.2 °C)] 100.22 °F (37.9 °C)  Pulse:  [] 99  Resp:  [0-29] 22  SpO2:  [87 %-100 %] 100 %  BP: ()/(30-67) 148/65  Arterial Line BP: ()/() 133/43     Weight: 66.2 kg (145 lb 15.1 oz)  Body mass index is 24.29 kg/m².     SpO2: 100 %  O2 Device (Oxygen Therapy): ventilator      Intake/Output Summary (Last 24 hours) at 7/21/2022 1240  Last data filed at 7/21/2022 1200  Gross per 24 hour   Intake 51298.25 ml   Output 4036 ml   Net 27013.25 ml       Lines/Drains/Airways       Central Venous Catheter Line  Duration                  Hemodialysis Catheter 07/20/22 1348 right femoral <1 day     Introducer with Double Lumen 07/20/22 1400 right internal jugular <1 day    Pulmonary Artery Catheter Assessment  07/20/22 1400 <1 day              Drain  Duration                  Hemodialysis AV Fistula 04/01/20 2203 Left upper arm 840 days         Closed/Suction Drain 07/20/22 1154 Left Leg Bulb 19 Fr. 1 day         Urethral Catheter 07/20/22 0740 Silicone;Temperature probe;Straight-tip 16 Fr. 1 day         Y Chest Tube 1 and 2 07/20/22 1204 1 Right Mediastinal 24 Fr. 2 Left Mediastinal 24 Fr. 1 day         Y Chest Tube 3 and 4 07/20/22 1205 3 Left Pleural 24 Fr. 4 Right Pleural 24 Fr. 1 day         NG/OG Tube 07/20/22 1445 Center mouth <1 day              Airway  Duration                  Airway - Non-Surgical 07/20/22 1 day              Arterial Line  Duration             Arterial Line 07/20/22 0712 Right Radial 1 day              Line  Duration                  Pacer Wires 07/20/22 1400 <1 day              Peripheral Intravenous Line  Duration                  Peripheral IV - Single Lumen 07/18/22  0625 20 G Posterior;Right Wrist 3 days                    Physical Exam  Vitals and nursing note reviewed.   Constitutional:       General: She is not in acute distress.     Appearance: She is well-developed. She is ill-appearing. She is not diaphoretic.      Comments: Intubated   HENT:      Head: Normocephalic and atraumatic.   Eyes:      General:         Right eye: No discharge.         Left eye: No discharge.      Pupils: Pupils are equal, round, and reactive to light.   Neck:      Thyroid: No thyromegaly.      Vascular: No JVD.      Trachea: No tracheal deviation.   Cardiovascular:      Rate and Rhythm: Normal rate and regular rhythm.      Heart sounds: Normal heart sounds, S1 normal and S2 normal. No murmur heard.     Comments: Chest tubes in place    Sternotomy site C/D/I; dressing in place  Pulmonary:      Effort: Pulmonary effort is normal. No respiratory distress.      Breath sounds: Normal breath sounds.      Comments: Diminished BS at bases  Abdominal:      General: There is no distension.      Tenderness: There is no rebound.   Musculoskeletal:      Cervical back: Neck supple.      Right lower leg: No edema.      Left lower leg: No edema.   Skin:     General: Skin is warm and dry.      Findings: No erythema.   Neurological:      Comments: Intubated but responds to stimuli       Significant Labs: CMP   Recent Labs   Lab 07/19/22  2127 07/20/22  0556 07/20/22  1714 07/20/22  2052 07/21/22  0406      < > 134* 137 138  136   K 3.9   < > 3.5 3.3* 3.8  3.8   CL 99   < > 100 104 106  106   CO2 23   < > 17* 15* 19*  19*   *   < > 384* 259* 112*  111*   BUN 24*   < > 35* 27* 20  21   CREATININE 5.1*   < > 5.5* 4.4* 3.1*  3.1*   CALCIUM 9.0   < > 8.6* 8.4* 8.3*  8.2*   PROT 6.3  --   --   --   --    ALBUMIN 3.2*  --   --  4.2 3.6   BILITOT 0.6  --   --   --   --    ALKPHOS 92  --   --   --   --    AST 15  --   --   --   --    ALT 17  --   --   --   --    ANIONGAP 15   < > 17* 18* 13  11    ESTGFRAFRICA 9*   < > 8* 11* 16*  16*   EGFRNONAA 8*   < > 7* 9* 14*  14*    < > = values in this interval not displayed.   , CBC   Recent Labs   Lab 07/20/22  0556 07/20/22  0809 07/20/22  1435 07/20/22  1437 07/21/22  0446   WBC 4.97  --  15.94*  --   --    HGB 11.1*  --  10.9*  --  4.3*   HCT 33.5*   < > 31.7*   < > 12.0*     --  210  --   --     < > = values in this interval not displayed.   , INR   Recent Labs   Lab 07/20/22  1435 07/20/22  2052 07/21/22  0406   INR 1.1 1.4* 1.3*   , Troponin No results for input(s): TROPONINI in the last 48 hours., and All pertinent lab results from the last 24 hours have been reviewed.    Significant Imaging: Echocardiogram: Transthoracic echo (TTE) complete (Cupid Only):   Results for orders placed or performed during the hospital encounter of 07/12/22   Echo   Result Value Ref Range    BSA 1.68 m2    IVC diameter 1.93 cm    Left Ventricular Outflow Tract Mean Velocity 0.112297238623614 cm/s    Left Ventricular Outflow Tract Mean Gradient 1.94 mmHg    LVIDd 4.43 3.5 - 6.0 cm    IVS 1.15 (A) 0.6 - 1.1 cm    Posterior Wall 0.96 0.6 - 1.1 cm    Ao root annulus 2.78 cm    LVIDs 3.44 2.1 - 4.0 cm    FS 22 28 - 44 %    STJ 2.42 cm    Ascending aorta 2.32 cm    LV mass 161.00 g    LA size 3.85 cm    Left Ventricle Relative Wall Thickness 0.43 cm    AV mean gradient 4 mmHg    AV valve area 1.56 cm2    AV Velocity Ratio 0.79     AV index (prosthetic) 0.78     MV valve area p 1/2 method 6.15 cm2    E/A ratio 1.31     E wave deceleration time 123.593328923602776 msec    IVRT 39.343793607170043 msec    LVOT diameter 1.60 cm    LVOT area 2.0 cm2    LVOT peak lio 1.00 m/s    LVOT peak VTI 19.70 cm    Ao peak lio 1.26 m/s    Ao VTI 25.4 cm    RVOT peak lio 0.62 m/s    RVOT peak VTI 13.3 cm    Mr max lio 6.14 m/s    LVOT stroke volume 39.59 cm3    AV peak gradient 6 mmHg    PV mean gradient 0.85 mmHg    MV Peak E Lio 1.11 m/s    TR Max Lio 4.26 m/s    MV stenosis pressure 1/2  time 35.957780908177364 ms    MV Peak A Lio 0.85 m/s    LV Systolic Volume 48.80 mL    LV Systolic Volume Index 29.0 mL/m2    LV Diastolic Volume 88.87 mL    LV Diastolic Volume Index 52.90 mL/m2    LV Mass Index 96 g/m2    RA Major Axis 3.88 cm    Left Atrium Minor Axis 3.59 cm    Left Atrium Major Axis 4.08 cm    Triscuspid Valve Regurgitation Peak Gradient 73 mmHg    EF 55 %    Narrative    · Concentric hypertrophy and normal systolic function.  · The estimated ejection fraction is 55%.  · Normal left ventricular diastolic function.  · Normal right ventricular size with normal right ventricular systolic   function.  · Moderate tricuspid regurgitation.  · There is pulmonary hypertension.       and EKG: Reviewed

## 2022-07-21 NOTE — ASSESSMENT & PLAN NOTE
- per CTS mgmt, will assist   - Intubated 7/20  - remains on nitro and two vasopressors, will attempt to wean off   - VAP prophylaxis  - daily SAT/SBT   - vent settings reviewed for optimal ventilation

## 2022-07-21 NOTE — PROGRESS NOTES
[facility] ([facility]  Nephrology  Progress Note    Patient Name: Avril Monzon  MRN: [id number]  Admission Date: 7/12/2022  Hospital Length of Stay: 9 days  Attending Provider: Sanju Locke MD   Primary Care Physician: Rose Parks MD  Principal Problem:S/P CABG (coronary artery bypass graft)    Subjective:     HPI: Noted    Interval History: Pt was seen and examined in ICU this am, received multiple visits during the day. Labs and meds reviewed. Discussed with other providers. S/p CABG yesterday. On CRRT since yesterday. CRRT and UF reviewed. No issues. Has tolerated CRRT well.    Review of patient's allergies indicates:   Allergen Reactions    Codeine Swelling    Darvon [propoxyphene] Swelling    Atorvastatin      Muscle twitching     Current Facility-Administered Medications   Medication Frequency    0.9%  NaCl infusion (CRRT USE ONLY) Continuous    0.9%  NaCl infusion (for blood administration) Q24H PRN    0.9%  NaCl infusion (for blood administration) Q24H PRN    0.9%  NaCl infusion (for blood administration) Q24H PRN    0.9%  NaCl infusion (for blood administration) Q24H PRN    0.9%  NaCl infusion (for blood administration) Q24H PRN    0.9%  NaCl infusion (for blood administration) Q24H PRN    acetaminophen oral solution 650 mg Q6H PRN    albumin human 5% bottle 25 g PRN    ascorbic acid (vitamin C) tablet 500 mg Daily    aspirin chewable tablet 81 mg Daily    calcium gluconate 1 g in NS IVPB (premixed) Once    chlorhexidine 0.12 % solution 10 mL BID    clopidogreL tablet 75 mg Daily    [START ON 7/22/2022] cyanocobalamin tablet 1,000 mcg Daily    dexmedetomidine (PRECEDEX) 400mcg/100mL dextrose 5% infusion Continuous    dextrose 10% bolus 125 mL PRN    dextrose 10% bolus 250 mL PRN    dextrose 5 % in lactated ringers infusion Continuous    EPINEPHrine (ADRENALIN) 5 mg in dextrose 5 % 250 mL infusion Continuous    famotidine tablet 20 mg Daily    fentaNYL 50  mcg/mL injection 50 mcg Q1H PRN    [START ON 7/22/2022] folic acid tablet 1 mg Daily    heparin (porcine) injection 4,000 Units PRN    heparin 25,000 units in dextrose 5% 250 mL (100 units/mL) infusion (heparin infusion - NO NOMOGRAM) Continuous    insulin regular 1 Units/mL in sodium chloride 0.9% 100 mL infusion Continuous PRN    lactated ringers infusion PRN    magnesium hydroxide 400 mg/5 ml suspension 2,400 mg Daily    magnesium sulfate 2g in water 50mL IVPB (premix) PRN    metoprolol tartrate (LOPRESSOR) tablet 25 mg BID    milrinone 20mg in D5W 100 mL infusion Continuous    mupirocin 2 % ointment BID    niCARdipine 40 mg/200 mL (0.2 mg/mL) infusion Continuous    nitric oxide gas Gas 40 ppm Continuous    ondansetron injection 4 mg Q12H PRN    polyethylene glycol packet 17 g Daily    promethazine (PHENERGAN) 12.5 mg in dextrose 5 % 50 mL IVPB Q6H PRN    sodium chloride 0.9% flush 10 mL PRN    vasopressin (PITRESSIN) 0.2 Units/mL in dextrose 5 % 100 mL infusion Continuous    [START ON 7/22/2022] vitamin renal formula (B-complex-vitamin c-folic acid) 1 mg per capsule 1 capsule Daily       Objective:     Vital Signs (Most Recent):  Temp: 100.04 °F (37.8 °C) (07/21/22 1415)  Pulse: 97 (07/21/22 1415)  Resp: 20 (07/21/22 1415)  BP: (!) 142/63 (07/21/22 1400)  SpO2: 100 % (07/21/22 1415)  O2 Device (Oxygen Therapy): ventilator (07/21/22 1313)   Vital Signs (24h Range):  Temp:  [92.3 °F (33.5 °C)-100.76 °F (38.2 °C)] 100.04 °F (37.8 °C)  Pulse:  [] 97  Resp:  [0-29] 20  SpO2:  [87 %-100 %] 100 %  BP: ()/(30-67) 142/63  Arterial Line BP: ()/() 129/42     Weight: 66.2 kg (145 lb 15.1 oz) (07/21/22 0416)  Body mass index is 24.29 kg/m².  Body surface area is 1.74 meters squared.    I/O last 3 completed shifts:  In: 46078.4 [I.V.:52815.1; Blood:1861.4; NG/GT:100; IV Piggyback:250]  Out: 3229 [Urine:398; Drains:140; Other:791; Chest Tube:1900]    Physical Exam  Vitals and nursing  note reviewed.   Cardiovascular:      Rate and Rhythm: Regular rhythm. Tachycardia present.      Pulses: Normal pulses.      Heart sounds: Normal heart sounds.   Pulmonary:      Breath sounds: Normal breath sounds. No wheezing or rales.      Comments: intubated  Abdominal:      Palpations: Abdomen is soft.   Musculoskeletal:      Right lower leg: No edema.      Left lower leg: No edema.       Significant Labs: reviewed  BMP  Lab Results   Component Value Date     07/21/2022    K 3.8 07/21/2022     07/21/2022    CO2 21 (L) 07/21/2022    BUN 15 07/21/2022    CREATININE 2.3 (H) 07/21/2022    CALCIUM 8.1 (L) 07/21/2022    ANIONGAP 10 07/21/2022    ESTGFRAFRICA 23 (A) 07/21/2022    EGFRNONAA 20 (A) 07/21/2022     Lab Results   Component Value Date    WBC 8.90 07/21/2022    HGB 9.9 (L) 07/21/2022    HCT 27.4 (L) 07/21/2022    MCV 84 07/21/2022    PLT 65 (L) 07/21/2022       ABG  Recent Labs   Lab 07/21/22  0440   PH 7.404   PO2 160*   PCO2 33.4*   HCO3 20.9*   BE -4       Significant Imaging: reviewed    Assessment/Plan:     78 y/o female with ESRD on chronic HD presented with acute coronary syndrome:     ESRD (end stage renal disease)  ESRD pt on chronic HD q MWF at [facility]  On CRRT since CABG on 7/20/22  Tolerating CRRT well, continue dialysis  Procedure reviewed with ICU and dialysis nurses  K normal  Na normal  Metabolic acidosis stable  Intubated   Pt was visited multiple times in ICU     HTN: BP stable  Meds reviewed     Anemia: mild, normocytic  On epogen with HD      * Acute coronary events  NSTEMI  Troponin very high  S/p CABG on 7/20/22  Reviewed the chart  Discussed with CT surgery      Type 2 diabetes mellitus with hyperglycemia, with long-term current use of insulin  Reviewed the chart.        Plans and recommendations:  As discussed above  Total critical care time spent 40 minutes including time needed to review the records, the   patient evaluation, documentation, face-to-face discussion  with the patient,   more than 50% of the time was spent on coordination of care and counseling.   Multiple medical issues were addressed, as documented. Medical care provided was in addition to providing dialysis. Pt received multiple visits and evaluations.        Angela Hanson MD  Nephrology  [facility] ([facility])

## 2022-07-21 NOTE — PROGRESS NOTES
Spoke with Dr. Locke regarding critical H/H & status update given.  Order received to transfuse 3u PRBC.  Due to recorded hypothermia, will use blood warmer.

## 2022-07-21 NOTE — ASSESSMENT & PLAN NOTE
78 y/o female with ESRD on chronic HD presented with acute coronary syndrome:     ESRD (end stage renal disease)  ESRD pt on chronic HD q MWF at [facility]  K normal  O2 sat OK  On CRRT post CABG. Tolerating well, continue CRRT  Dialysis bath and UF rate discussed with nursing staff  Pt was visited multiple times in ICU     HTN: BP stable  Meds reviewed     Anemia: mild, normocytic  On epogen with HD      * Acute coronary events  NSTEMI  Troponin very high  S/p CABG this am  Reviewed the chart  Discussed with CT surgery      Type 2 diabetes mellitus with hyperglycemia, with long-term current use of insulin  Reviewed the chart.        Plans and recommendations:  As discussed above  Total critical care time spent 40 minutes including time needed to review the records, the   patient evaluation, documentation, face-to-face discussion with the patient,   more than 50% of the time was spent on coordination of care and counseling.   Multiple medical issues were addressed, as documented. Medical care provided was in addition to providing dialysis. Pt received multiple visits and evaluations.

## 2022-07-21 NOTE — PROGRESS NOTES
[facility] ([facility])  Cardiology  Progress Note    Patient Name: Avril Monzon  MRN: [id number]  Admission Date: 7/12/2022  Hospital Length of Stay: 9 days  Code Status: Full Code   Attending Physician: Sanju Locke MD   Primary Care Physician: Rose Parks MD  Expected Discharge Date:   Principal Problem:S/P CABG (coronary artery bypass graft)    Subjective:   HPI:  77 y.o. female patient with a PMHx of CKD, CAD s/p cardiac stenting, DM2, heart failure, HLD, HTN, NSTEMI, seizure, tobacco dependence, and CVA presents with NSTEMI and CHF.  EKG NSR, ST depression multiple leads. Initial troponin is 12. Pt being dialyzed today.        Hospital Course:   7/14/2022--Patient seen and examined in room, lying in bed. HD in progress. Denies any cardiac complaints today. Awaiting CABG on Monday.     7/15/2022--patient seen and examined in room, denies any complaints on exam today.     7/17/22 No c/o on HD on exam. Patient denies CP, angina or anginal equivalent. CABG tomorrow. Start statin after cabg, continue b blockers    7/18/2022 stable no chest pain . Cabg on hold due equipment need for off pump bypass    7/21/22-Patient seen and examined today, s/p CABG x 2. Increased OP from chest tubes overnight, H/H dipped to 4.3/12.0, being transfused. On pressor support, undergoing CRRT.           ROSnot obtained given acuity of condition  Objective:     Vital Signs (Most Recent):  Temp: 100.22 °F (37.9 °C) (07/21/22 1215)  Pulse: 99 (07/21/22 1215)  Resp: (!) 22 (07/21/22 1215)  BP: (!) 148/65 (07/21/22 1200)  SpO2: 100 % (07/21/22 1215)   Vital Signs (24h Range):  Temp:  [92.3 °F (33.5 °C)-100.76 °F (38.2 °C)] 100.22 °F (37.9 °C)  Pulse:  [] 99  Resp:  [0-29] 22  SpO2:  [87 %-100 %] 100 %  BP: ()/(30-67) 148/65  Arterial Line BP: ()/() 133/43     Weight: 66.2 kg (145 lb 15.1 oz)  Body mass index is 24.29 kg/m².     SpO2: 100 %  O2 Device (Oxygen Therapy): ventilator      Intake/Output  Summary (Last 24 hours) at 7/21/2022 1240  Last data filed at 7/21/2022 1200  Gross per 24 hour   Intake 95048.25 ml   Output 4036 ml   Net 95017.25 ml       Lines/Drains/Airways       Central Venous Catheter Line  Duration                  Hemodialysis Catheter 07/20/22 1348 right femoral <1 day     Introducer with Double Lumen 07/20/22 1400 right internal jugular <1 day    Pulmonary Artery Catheter Assessment  07/20/22 1400 <1 day              Drain  Duration                  Hemodialysis AV Fistula 04/01/20 2203 Left upper arm 840 days         Closed/Suction Drain 07/20/22 1154 Left Leg Bulb 19 Fr. 1 day         Urethral Catheter 07/20/22 0740 Silicone;Temperature probe;Straight-tip 16 Fr. 1 day         Y Chest Tube 1 and 2 07/20/22 1204 1 Right Mediastinal 24 Fr. 2 Left Mediastinal 24 Fr. 1 day         Y Chest Tube 3 and 4 07/20/22 1205 3 Left Pleural 24 Fr. 4 Right Pleural 24 Fr. 1 day         NG/OG Tube 07/20/22 1445 Center mouth <1 day              Airway  Duration                  Airway - Non-Surgical 07/20/22 1 day              Arterial Line  Duration             Arterial Line 07/20/22 0712 Right Radial 1 day              Line  Duration                  Pacer Wires 07/20/22 1400 <1 day              Peripheral Intravenous Line  Duration                  Peripheral IV - Single Lumen 07/18/22 0625 20 G Posterior;Right Wrist 3 days                    Physical Exam  Vitals and nursing note reviewed.   Constitutional:       General: She is not in acute distress.     Appearance: She is well-developed. She is ill-appearing. She is not diaphoretic.      Comments: Intubated   HENT:      Head: Normocephalic and atraumatic.   Eyes:      General:         Right eye: No discharge.         Left eye: No discharge.      Pupils: Pupils are equal, round, and reactive to light.   Neck:      Thyroid: No thyromegaly.      Vascular: No JVD.      Trachea: No tracheal deviation.   Cardiovascular:      Rate and Rhythm: Normal rate  and regular rhythm.      Heart sounds: Normal heart sounds, S1 normal and S2 normal. No murmur heard.     Comments: Chest tubes in place    Sternotomy site C/D/I; dressing in place  Pulmonary:      Effort: Pulmonary effort is normal. No respiratory distress.      Breath sounds: Normal breath sounds.      Comments: Diminished BS at bases  Abdominal:      General: There is no distension.      Tenderness: There is no rebound.   Musculoskeletal:      Cervical back: Neck supple.      Right lower leg: No edema.      Left lower leg: No edema.   Skin:     General: Skin is warm and dry.      Findings: No erythema.   Neurological:      Comments: Intubated but responds to stimuli       Significant Labs: CMP   Recent Labs   Lab 07/19/22  2127 07/20/22  0556 07/20/22  1714 07/20/22  2052 07/21/22  0406      < > 134* 137 138  136   K 3.9   < > 3.5 3.3* 3.8  3.8   CL 99   < > 100 104 106  106   CO2 23   < > 17* 15* 19*  19*   *   < > 384* 259* 112*  111*   BUN 24*   < > 35* 27* 20  21   CREATININE 5.1*   < > 5.5* 4.4* 3.1*  3.1*   CALCIUM 9.0   < > 8.6* 8.4* 8.3*  8.2*   PROT 6.3  --   --   --   --    ALBUMIN 3.2*  --   --  4.2 3.6   BILITOT 0.6  --   --   --   --    ALKPHOS 92  --   --   --   --    AST 15  --   --   --   --    ALT 17  --   --   --   --    ANIONGAP 15   < > 17* 18* 13  11   ESTGFRAFRICA 9*   < > 8* 11* 16*  16*   EGFRNONAA 8*   < > 7* 9* 14*  14*    < > = values in this interval not displayed.   , CBC   Recent Labs   Lab 07/20/22  0556 07/20/22  0809 07/20/22  1435 07/20/22  1437 07/21/22  0446   WBC 4.97  --  15.94*  --   --    HGB 11.1*  --  10.9*  --  4.3*   HCT 33.5*   < > 31.7*   < > 12.0*     --  210  --   --     < > = values in this interval not displayed.   , INR   Recent Labs   Lab 07/20/22  1435 07/20/22  2052 07/21/22  0406   INR 1.1 1.4* 1.3*   , Troponin No results for input(s): TROPONINI in the last 48 hours., and All pertinent lab results from the last 24 hours have  been reviewed.    Significant Imaging: Echocardiogram: Transthoracic echo (TTE) complete ([facility] Only):   Results for orders placed or performed during the hospital encounter of 07/12/22   Echo   Result Value Ref Range    BSA 1.68 m2    IVC diameter 1.93 cm    Left Ventricular Outflow Tract Mean Velocity 0.452387461001217 cm/s    Left Ventricular Outflow Tract Mean Gradient 1.94 mmHg    LVIDd 4.43 3.5 - 6.0 cm    IVS 1.15 (A) 0.6 - 1.1 cm    Posterior Wall 0.96 0.6 - 1.1 cm    Ao root annulus 2.78 cm    LVIDs 3.44 2.1 - 4.0 cm    FS 22 28 - 44 %    STJ 2.42 cm    Ascending aorta 2.32 cm    LV mass 161.00 g    LA size 3.85 cm    Left Ventricle Relative Wall Thickness 0.43 cm    AV mean gradient 4 mmHg    AV valve area 1.56 cm2    AV Velocity Ratio 0.79     AV index (prosthetic) 0.78     MV valve area p 1/2 method 6.15 cm2    E/A ratio 1.31     E wave deceleration time 123.490688845707036 msec    IVRT 39.353872685840424 msec    LVOT diameter 1.60 cm    LVOT area 2.0 cm2    LVOT peak lio 1.00 m/s    LVOT peak VTI 19.70 cm    Ao peak lio 1.26 m/s    Ao VTI 25.4 cm    RVOT peak lio 0.62 m/s    RVOT peak VTI 13.3 cm    Mr max lio 6.14 m/s    LVOT stroke volume 39.59 cm3    AV peak gradient 6 mmHg    PV mean gradient 0.85 mmHg    MV Peak E Lio 1.11 m/s    TR Max Lio 4.26 m/s    MV stenosis pressure 1/2 time 35.135356518222377 ms    MV Peak A Lio 0.85 m/s    LV Systolic Volume 48.80 mL    LV Systolic Volume Index 29.0 mL/m2    LV Diastolic Volume 88.87 mL    LV Diastolic Volume Index 52.90 mL/m2    LV Mass Index 96 g/m2    RA Major Axis 3.88 cm    Left Atrium Minor Axis 3.59 cm    Left Atrium Major Axis 4.08 cm    Triscuspid Valve Regurgitation Peak Gradient 73 mmHg    EF 55 %    Narrative    · Concentric hypertrophy and normal systolic function.  · The estimated ejection fraction is 55%.  · Normal left ventricular diastolic function.  · Normal right ventricular size with normal right ventricular systolic   function.  ·  Moderate tricuspid regurgitation.  · There is pulmonary hypertension.       and EKG: Reviewed    Assessment and Plan:   Patient who presents with NSTEMI/mutlivessel CAD/drop in EF. Recovering post CABG, mgmt as per CTS.    * S/P CABG (coronary artery bypass graft)  -Continue current post-op care and mgmt as per CTS  -H/H dipped to 4.3/12.0, being transfused  -Wean pressors as tolerated  -ASA, Plavix, BB, statin as condition permits    NSTEMI (non-ST elevated myocardial infarction)  Plan for CABG on Monday per CTS  Continue ASA BB  Chest pain free    7/18/2022 chest pain free awaiting cabg    7/21/22  -See plan under CABG    Elevated troponin  77 y.o. female patient with a PMHx of CKD, CAD s/p cardiac stenting, DM2, heart failure, HLD, HTN, NSTEMI, seizure, tobacco dependence, and CVA presents with NSTEMI and CHF.  EKG NSR, ST depression multiple leads. Initial troponin is 12. Pt being dialyzed today.    Continue IV heparin, cardiac cath once CHF stable, being dialyzed today    CAD (coronary artery disease)  Has sevre cad 3 vessel low ef for cabg    ESRD (end stage renal disease)  -Mgmt per nephrology    Hypertension associated with diabetes  -Continue BB as BP permits        VTE Risk Mitigation (From admission, onward)         Ordered     heparin 25,000 units in dextrose 5% 250 mL (100 units/mL) infusion (heparin infusion - NO NOMOGRAM)  Continuous         07/20/22 1630     heparin (porcine) injection 4,000 Units  As needed (PRN)         07/20/22 1632     IP VTE HIGH RISK PATIENT  Once         07/20/22 1503     Place sequential compression device  Until discontinued         07/19/22 1700     Place sequential compression device  Until discontinued         07/17/22 2118     Place DYANA hose  Until discontinued         07/12/22 1637     Place sequential compression device  Until discontinued         07/12/22 1637                Moriah Tran PA-C  Cardiology  [facility] - [facility]ensive [facility] ([facility])

## 2022-07-21 NOTE — ASSESSMENT & PLAN NOTE
-Continue current post-op care and mgmt as per CTS  -H/H dipped to 4.3/12.0, being transfused  -Wean pressors as tolerated  -ASA, Plavix, BB, statin as condition permits

## 2022-07-21 NOTE — ASSESSMENT & PLAN NOTE
- CTS mgmt  - Day 1: s/p CABG   - 7/13 LHC:  · The pre-procedure left ventricular end diastolic pressure was 31.  · The post-procedure left ventricular end diastolic pressure was 32.  · The ejection fraction was calculated to be 40%.  · The Prox RCA to Mid RCA lesion was 100% stenosed.  · The Ost Cx to Prox Cx lesion was 99% stenosed.  · The Prox LAD to Mid LAD lesion was 75% stenosed.  · The estimated blood loss was none.  · There was three vessel coronary artery disease.

## 2022-07-21 NOTE — PROGRESS NOTES
Subjective:      Patient ID: Avril Monzon is a 77 y.o. female.    Chief Complaint: Chest Pain (Seen at urgent care yesterday; c/o sharp pain with shortness of breath; hx of MI three years ago)    HPI:  77-year-old female who was admitted with the NSTEMI had i left heart catheterization which revealed 3 vessel coronary artery disease.  Patient has previous PCI to her proximal LAD and she has the diabetic hypertension hyperlipidemia end-stage renal disease on dialysis.  She also had pulmonary hypertension with EF of 40% global hypokinesis he moderate mitral regurgitation preop.  She was chronically anemic and history of smoking in the past she has diabetic retinopathy.  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  The patient had multiple dialysis she was also dated for the Plavix washout before she was taken up for surgery.  The patient had CABG x2 on a beating heart pump assisted coronary artery bypass grafting in the postoperative.  The patient had pulmonary hypertension with right heart failure for which she was assisted with nitric oxide inhalation she came to the ICU and was started on her planned CRRT.  The patient remained coagulopathic and hypo thermic overnight for which she was treated with FFP cryo and also packed cells she is on epinephrine Primacor and intermittent vasopressin she is awake and follows simple commands and this morning her nitric oxide is being weaned off.    Review of patient's allergies indicates:   Allergen Reactions    Codeine Swelling    Darvon [propoxyphene] Swelling    Atorvastatin      Muscle twitching       Past Medical History:   Diagnosis Date    Acute hypoxemic respiratory failure 11/26/2018    Anemia     Arthritis     back, hand, knees    Back pain     Cataract     CKD stage G4/A3, GFR 15 - 29 and albumin creatinine ratio >300 mg/g      GFR 40% 2014 and 33% ub 3/2014 ([facility])    Coronary artery disease involving native coronary artery of native heart 2018    Diabetic retinopathy     DM (diabetes mellitus) type II controlled, neurological manifestation     Exudative age-related macular degeneration of left eye with active choroidal neovascularization 2019    GERD (gastroesophageal reflux disease)     Glaucoma     Heart failure     Hyperlipidemia     Hypertension     Non-ST elevation MI (NSTEMI) 2018    NSTEMI (non-ST elevated myocardial infarction) 2018    Peripheral neuropathy     Polyneuropathy     Proteinuria     >6 mo     Seizures     [facility] 2014 -dick CT NRI showed small vessel    Stroke     Tobacco dependence     Type 2 diabetes with peripheral circulatory disorder, controlled        Family History   Problem Relation Age of Onset    Diabetes Mother     Hyperlipidemia Mother     Hypertension Mother     Heart disease Mother         MI    Muscular dystrophy Son     Cancer Maternal Grandmother         colon       Social History     Socioeconomic History    Marital status:    Tobacco Use    Smoking status: Former Smoker     Packs/day: 1.50     Years: 30.00     Pack years: 45.00     Types: Cigarettes     Quit date: 1990     Years since quittin.5    Smokeless tobacco: Former User   Substance and Sexual Activity    Alcohol use: No     Alcohol/week: 0.0 standard drinks    Drug use: No    Sexual activity: Not Currently       Past Surgical History:   Procedure Laterality Date    BREAST LUMPECTOMY Left     benign, when patient was 15 years old    BREAST MASS EXCISION      ,benign, age 15.    CATHETERIZATION OF BOTH LEFT AND RIGHT HEART N/A 2018    Procedure: CATHETERIZATION, HEART, BOTH LEFT AND RIGHT;  Surgeon: Michelle Holman MD;  [facility] C[facility] LAB;  Service: Cardiology;  Laterality: N/A;    CATHETERIZATION OF BOTH LEFT AND RIGHT HEART N/A 2018     "Procedure: CATHETERIZATION, HEART, BOTH LEFT AND RIGHT;  Surgeon: Michelle Holman MD;  Location: [facility] CATH LAB;  Service: Cardiology;  Laterality: N/A;    HYSTERECTOMY  1982    INSERTION OF SHUNT      LEFT HEART CATHETERIZATION Left 12/3/2018    Procedure: CATHETERIZATION, HEART, LEFT;  Surgeon: Michelle Holman MD;  Location: [facility] CATH LAB;  Service: Cardiology;  Laterality: Left;  LAD ATHERECTOMY STENT/ FEMORAL APPROACH    LEFT HEART CATHETERIZATION Left 7/13/2022    Procedure: CATHETERIZATION, HEART, LEFT;  Surgeon: Abhi Sloan MD;  Location: [facility] CATH LAB;  Service: Cardiology;  Laterality: Left;    OOPHORECTOMY      wrist cyst Right 1980s    dorsal wrist cyst       Review of Systems   Unable to perform ROS: Acuity of condition          Objective:   BP (!) 142/66   Pulse 102   Temp (!) 100.58 °F (38.1 °C)   Resp (!) 22   Ht 5' 5" (1.651 m)   Wt 66.2 kg (145 lb 15.1 oz)   LMP  (LMP Unknown)   SpO2 100%   Breastfeeding No   BMI 24.29 kg/m²     X-Ray Chest AP Portable  Narrative: EXAMINATION:  XR CHEST AP PORTABLE    CLINICAL HISTORY:  Atherosclerotic heart disease of native coronary artery without angina pectorisPost-op;    COMPARISON:  July 20, 2022    FINDINGS:  There has been interval development of multiple loculated fluid collections within the superior and lateral left pleural space, despite the presence of a left-sided chest tube.  Stable right-sided chest tube.  Negative for pneumothorax.    Stable endotracheal tube, nasogastric tube, right jugular Cordis, Mcalister-Grisel catheter with tip in the left main pulmonary artery and large-bore mediastinal drains.    Worsening left retrocardiac and left upper lobe atelectasis/consolidation.  Right lung remains clear.    The hilar and mediastinal contours and osseous structures are unchanged.  Impression: 1.  Overall, there is been interval worsening with development of multiple loculated fluid collections throughout the superior and lateral left " pleural space as well as patchy infiltrates within the left upper lung and left retrocardiac region, despite the presence of a left-sided chest tube.    2.  Stable lines and tubes otherwise.  Stable appearance to the right lung.  Negative for pneumothorax.    3.  Follow-up radiographs recommended.    Electronically signed by: Trev Dean MD  Date:    07/21/2022  Time:    07:19         Physical Exam  Vitals and nursing note reviewed.   Constitutional:       Appearance: She is ill-appearing.      Comments: Patient is sedated intubated   HENT:      Head: Normocephalic.      Mouth/Throat:      Mouth: Mucous membranes are moist.   Eyes:      Extraocular Movements: Extraocular movements intact.      Pupils: Pupils are equal, round, and reactive to light.   Cardiovascular:      Rate and Rhythm: Regular rhythm. Tachycardia present.   Pulmonary:      Effort: Pulmonary effort is normal.      Breath sounds: Normal breath sounds.   Abdominal:      Palpations: Abdomen is soft.   Musculoskeletal:      Cervical back: Normal range of motion and neck supple.   Skin:     General: Skin is warm.      Capillary Refill: Capillary refill takes less than 2 seconds.   Neurological:      General: No focal deficit present.      Mental Status: She is alert. Mental status is at baseline.     Chest x-ray shows postsurgical changes with small left-sided effusion and bibasilar atelectasis    Assessment:     1. Coronary artery disease involving native heart, unspecified vessel or lesion type, unspecified whether angina present    2. Chest pain    3. NSTEMI (non-ST elevated myocardial infarction)    4. Diabetic ketoacidosis without coma associated with other specified diabetes mellitus    5. Hyperkalemia    6. ESRD (end stage renal disease)    7. Elevated troponin I level    8. Congestive heart failure, unspecified HF chronicity, unspecified heart failure type    9. Elevated troponin    10. Post-operative state    11. S/P CABG (coronary artery  bypass graft)    12. CAD (coronary artery disease)    13. DKA (diabetic ketoacidosis)    14. Hypertension associated with diabetes    15. Metabolic acidosis    16. On mechanically assisted ventilation          Plan   CABG x2 postop day 1  Continue to wean the nitric oxide  Wean the ventilator as tolerated  Aggressive pulmonary toilet  Wean the vasopressin to keep the map above 60  Postoperative anemia acute blood loss transfusion to keep hematocrit about 24  Coagulopathy cryoprecipitate frequent checks as needed  Diabetes mellitus on insulin drip  DVT and GI prophylaxis Pepcid and SCDs  Wean the sedation as tolerated  Nutrition renal diet after extubation  Critically ill    I spent 35 minutes rounding on the patient with multi disciplinary team and patient care          Sanju Locke MD  [facility]  [place]

## 2022-07-21 NOTE — PROGRESS NOTES
Dr. Locke at bedside.  Orders received for 1 FFP (2nd dose) and 1unit PRBC.  Dr. Galvan changing temporary pacemaker settings to rate of 86, A & V outputs at 20. Core temp per bladder at         94.9 despite Florinda Hugger.  R groin puncture site (site above current vas cath site) with active bleeding, Dr. Galvan notified.

## 2022-07-21 NOTE — CONSULTS
"[facility] - [facility] ([facility])  Adult Nutrition  Progress Note     SUMMARY         Recommendations     Recommendation/Intervention:   1. To advance oral diet as appropriate.    2. To recommended a cardiac, renal diet     Goals: Patient to tolerate cardiac, renal oral diet.     Nutrition Goal Status: new     Communication of RD Recs: other (comment) (plan of care; sticky notes)        Assessment and Plan     Nutrition Problem  Inadequate po intake      Related to (etiology):   Npo status      Signs and Symptoms (as evidenced by):   inadequate oral intake      Interventions/Recommendation (treatment strategy):  1.  To monitor length of NPO status  2.  To recommended Cardiac, Renal diet as oral intake is appropriate     Nutrition Diagnosis Status:   New        Malnutrition Assessment                                  Reason for Assessment     Reason For Assessment: RD follow-up     Diagnosis: other (see comments) (s/p surgical procedure)     Relevant Medical History: Nstemi, ESRD on HD, Pulmonary edema, Diabetes     Interdisciplinary Rounds: did not attend     General Information Comments:   7/20/2022:Patient pending surgical procedure at this time.  Patient with NPO status at this time.  Oral supplements previous ordered:  Novosource BID.  Last bowel movement documented on 7/14/2022.   Will continue to monitor npo status and tolerance issues.  (RD remote)     Nutrition Discharge Planning: Patient to continue and tolerate cardiac/renal diet.     Nutrition Risk Screen     Nutrition Risk Screen: other (see comments) (npo status)     Nutrition/Diet History     Spiritual, Cultural Beliefs, Restorationist Practices, Values that Affect Care: no  Food Allergies: NKFA  Factors Affecting Nutritional Intake: NPO     Anthropometrics     Temp: 98 °F (36.7 °C)  Height Method: Stated  Height: 5' 5" (165.1 cm)  Height (inches): 65 in  Weight Method: Bed Scale  Weight: 61.9 kg (136 lb 7.4 oz)  Weight (lb): 136.47 lb  Ideal Body Weight (IBW), " Female: 125 lb  % Ideal Body Weight, Female (lb): 108.8 %  BMI (Calculated): 22.7  BMI Grade: 18.5-24.9 - normal     Lab/Procedures/Meds  BMP        Lab Results   Component Value Date      (L) 07/20/2022     K 4.4 07/20/2022     CL 99 07/20/2022     CO2 25 07/20/2022     BUN 30 (H) 07/20/2022     CREATININE 5.8 (H) 07/20/2022     CALCIUM 8.7 07/20/2022     ANIONGAP 10 07/20/2022     ESTGFRAFRICA 8 (A) 07/20/2022     EGFRNONAA 7 (A) 07/20/2022            Lab Results   Component Value Date      (L) 07/20/2022     K 4.4 07/20/2022     CL 99 07/20/2022     CO2 25 07/20/2022            Lab Results   Component Value Date     CALCIUM 8.7 07/20/2022     PHOS 4.8 (H) 07/20/2022            Lab Results   Component Value Date     LABPROT 10.3 07/12/2022     ALBUMIN 3.2 (L) 07/19/2022            Lab Results   Component Value Date     HGBA1C 9.3 (H) 07/12/2022            Lab Results   Component Value Date     CREATININE 5.8 (H) 07/20/2022     BUN 30 (H) 07/20/2022      (L) 07/20/2022     K 4.4 07/20/2022     CL 99 07/20/2022     CO2 25 07/20/2022      Scheduled Meds:   aspirin  81 mg Oral Daily    famotidine  20 mg Oral Daily    insulin detemir U-100  6 Units Subcutaneous QHS    metoprolol tartrate  50 mg Oral BID    senna-docusate 8.6-50 mg  2 tablet Oral Daily    vitamin renal formula (B-complex-vitamin c-folic acid)  1 capsule Oral Daily      Continuous Infusions:   sodium chloride 0.9% 50 mL/hr (07/13/22 1218)    EPINEPHrine      vasopressin (PITRESSIN) infusion        PRN Meds:.sodium chloride, sodium chloride 0.9%, acetaminophen, amiodarone, atropine, BUPivacaine (PF) 0.25% (2.5 mg/ml), BUPivacaine liposome (PF), ceFAZolin (ANCEF) IVPB, ceFAZolin, chlorhexidine, dextrose 10 % in water (D10W), dextrose 10 % in water (D10W), dextrose 10%, dextrose 10%, glucagon (human recombinant), glucose, glucose, heparin (porcine), insulin aspart U-100, metoprolol tartrate, neomycin-polymyxin B, nitroGLYCERIN,  papaverine, promethazine (PHENERGAN) IVPB, sodium chloride 0.9%, vancomycin     Pertinent Labs Reviewed: reviewed  Pertinent Medications Reviewed: reviewed     Physical Findings/Assessment        Estimated/Assessed Needs     Weight Used For Calorie Calculations: 61.9 kg (136 lb 7.4 oz)  Energy Calorie Requirements (kcal): 30-35 kcals/kg  Energy Need Method: Kcal/kg (ESRD)  Protein Requirements: 1.2-2.0g/kg  Weight Used For Protein Calculations: 61.9 kg (136 lb 7.4 oz)  Fluid Requirements (mL): 1000+output  Estimated Fluid Requirement Method: other (see comments) (ESRD on HD)  RDA Method (mL): 30  CHO Requirement: 232-270 per day        Nutrition Prescription Ordered     Current Diet Order: NPO  Oral Nutrition Supplement: Novasource BID     Evaluation of Received Nutrient/Fluid Intake     % Kcal Needs:  (npo)  % Protein Needs:  (npo)  Energy Calories Required: other (see comments) (npo)  Protein Required: other (see comments) (npo)  Fluid Required: other (see comments) (npo)  Tolerance: other (see comments) (npo)  % Intake of Estimated Energy Needs: Other: NPO status   % Meal Intake: NPO     Nutrition Risk     Level of Risk/Frequency of Follow-up: moderate - high      Monitor and Evaluation     Food and Nutrient Intake: energy intake, food and beverage intake  Food and Nutrient Adminstration: diet order  Knowledge/Beliefs/Attitudes: food and nutrition knowledge/skill, beliefs and attitudes  Physical Activity and Function: nutrition-related ADLs and IADLs, factors affecting access to physical activity  Anthropometric Measurements: height/length, weight, weight change, body mass index  Biochemical Data, Medical Tests and Procedures: electrolyte and renal panel, lipid profile, gastrointestinal profile, glucose/endocrine profile, inflammatory profile      Nutrition Follow-Up     RD Follow-up?: Yes      Mariah Sauer, MS, RDN, LDN             Revision History

## 2022-07-21 NOTE — PROGRESS NOTES
Core temp probe changed from bladder due to oliguric/anuric status to rectal in attempt to assure registered core temp is accurate. Rectal temp correlating with bladder temp.  Refer to blood flowsheets for products infused/

## 2022-07-21 NOTE — SUBJECTIVE & OBJECTIVE
Interval History: Pt was seen and examined in ICU this am, received multiple visits during the day. Labs and meds reviewed. Discussed with other providers. S/p CABG yesterday. On CRRT since yesterday. CRRT and UF reviewed. No issues. Has tolerated CRRT well.    Review of patient's allergies indicates:   Allergen Reactions    Codeine Swelling    Darvon [propoxyphene] Swelling    Atorvastatin      Muscle twitching     Current Facility-Administered Medications   Medication Frequency    0.9%  NaCl infusion (CRRT USE ONLY) Continuous    0.9%  NaCl infusion (for blood administration) Q24H PRN    0.9%  NaCl infusion (for blood administration) Q24H PRN    0.9%  NaCl infusion (for blood administration) Q24H PRN    0.9%  NaCl infusion (for blood administration) Q24H PRN    0.9%  NaCl infusion (for blood administration) Q24H PRN    0.9%  NaCl infusion (for blood administration) Q24H PRN    acetaminophen oral solution 650 mg Q6H PRN    albumin human 5% bottle 25 g PRN    ascorbic acid (vitamin C) tablet 500 mg Daily    aspirin chewable tablet 81 mg Daily    calcium gluconate 1 g in NS IVPB (premixed) Once    chlorhexidine 0.12 % solution 10 mL BID    clopidogreL tablet 75 mg Daily    [START ON 7/22/2022] cyanocobalamin tablet 1,000 mcg Daily    dexmedetomidine (PRECEDEX) 400mcg/100mL dextrose 5% infusion Continuous    dextrose 10% bolus 125 mL PRN    dextrose 10% bolus 250 mL PRN    dextrose 5 % in lactated ringers infusion Continuous    EPINEPHrine (ADRENALIN) 5 mg in dextrose 5 % 250 mL infusion Continuous    famotidine tablet 20 mg Daily    fentaNYL 50 mcg/mL injection 50 mcg Q1H PRN    [START ON 7/22/2022] folic acid tablet 1 mg Daily    heparin (porcine) injection 4,000 Units PRN    heparin 25,000 units in dextrose 5% 250 mL (100 units/mL) infusion (heparin infusion - NO NOMOGRAM) Continuous    insulin regular 1 Units/mL in sodium chloride 0.9% 100 mL infusion Continuous PRN    lactated ringers infusion PRN    magnesium  hydroxide 400 mg/5 ml suspension 2,400 mg Daily    magnesium sulfate 2g in water 50mL IVPB (premix) PRN    metoprolol tartrate (LOPRESSOR) tablet 25 mg BID    milrinone 20mg in D5W 100 mL infusion Continuous    mupirocin 2 % ointment BID    niCARdipine 40 mg/200 mL (0.2 mg/mL) infusion Continuous    nitric oxide gas Gas 40 ppm Continuous    ondansetron injection 4 mg Q12H PRN    polyethylene glycol packet 17 g Daily    promethazine (PHENERGAN) 12.5 mg in dextrose 5 % 50 mL IVPB Q6H PRN    sodium chloride 0.9% flush 10 mL PRN    vasopressin (PITRESSIN) 0.2 Units/mL in dextrose 5 % 100 mL infusion Continuous    [START ON 7/22/2022] vitamin renal formula (B-complex-vitamin c-folic acid) 1 mg per capsule 1 capsule Daily       Objective:     Vital Signs (Most Recent):  Temp: 100.04 °F (37.8 °C) (07/21/22 1415)  Pulse: 97 (07/21/22 1415)  Resp: 20 (07/21/22 1415)  BP: (!) 142/63 (07/21/22 1400)  SpO2: 100 % (07/21/22 1415)  O2 Device (Oxygen Therapy): ventilator (07/21/22 1313)   Vital Signs (24h Range):  Temp:  [92.3 °F (33.5 °C)-100.76 °F (38.2 °C)] 100.04 °F (37.8 °C)  Pulse:  [] 97  Resp:  [0-29] 20  SpO2:  [87 %-100 %] 100 %  BP: ()/(30-67) 142/63  Arterial Line BP: ()/() 129/42     Weight: 66.2 kg (145 lb 15.1 oz) (07/21/22 7786)  Body mass index is 24.29 kg/m².  Body surface area is 1.74 meters squared.    I/O last 3 completed shifts:  In: 54755.4 [I.V.:18479.1; Blood:1861.4; NG/GT:100; IV Piggyback:250]  Out: 3229 [Urine:398; Drains:140; Other:791; Chest Tube:1900]    Physical Exam  Vitals and nursing note reviewed.   Cardiovascular:      Rate and Rhythm: Regular rhythm. Tachycardia present.      Pulses: Normal pulses.      Heart sounds: Normal heart sounds.   Pulmonary:      Breath sounds: Normal breath sounds. No wheezing or rales.      Comments: intubated  Abdominal:      Palpations: Abdomen is soft.   Musculoskeletal:      Right lower leg: No edema.      Left lower leg: No edema.        Significant Labs: reviewed  BMP  Lab Results   Component Value Date     07/21/2022    K 3.8 07/21/2022     07/21/2022    CO2 21 (L) 07/21/2022    BUN 15 07/21/2022    CREATININE 2.3 (H) 07/21/2022    CALCIUM 8.1 (L) 07/21/2022    ANIONGAP 10 07/21/2022    ESTGFRAFRICA 23 (A) 07/21/2022    EGFRNONAA 20 (A) 07/21/2022     Lab Results   Component Value Date    WBC 8.90 07/21/2022    HGB 9.9 (L) 07/21/2022    HCT 27.4 (L) 07/21/2022    MCV 84 07/21/2022    PLT 65 (L) 07/21/2022       ABG  Recent Labs   Lab 07/21/22  0440   PH 7.404   PO2 160*   PCO2 33.4*   HCO3 20.9*   BE -4       Significant Imaging: reviewed

## 2022-07-21 NOTE — ASSESSMENT & PLAN NOTE
Plan for CABG on Monday per CTS  Continue ASA BB  Chest pain free    7/18/2022 chest pain free awaiting cabg    7/21/22  -See plan under CABG

## 2022-07-21 NOTE — PLAN OF CARE
Pt sedated to RASS 0 on Precedex gtt. Fentanyl PRN pain. NSR on the monitor. External pacer set to back-up pace at 50bpm. Milrinone, Epinephrine, and Vasopressin gtts continued. [place]-isabel catheter in place at 45cm. CVP low. 1 liter Albumin given. Mechanically ventilated: R 20, Tv 340, 40% FiO2, 5 PEEP. Nitric Oxide at 35ppm. Oxygenating well. Mediastinal and Pleural chest tubes to -20mmHg suction with bright red drainage. -90cc to mediastinal chest tubes and -200cc to pleural chest tubes this shift. OGT in place. Insulin gtt initiated, titrated per nomogram q1h. Pfeiffer catheter in place. Oliguria. CRRT initiated to R femoral vascath. Goal UF is net zero. Pt not tolerating goal UF of 100: dropped to 50 r/t hypotension. Bed low, wheels locked, alarms audible. POC reviewed with pt and family today.

## 2022-07-21 NOTE — PLAN OF CARE
"S/P CABG x2 on 7/20/22, remains intubated per MD directive.  Nitric Oxide weaned down during shift per RRT to 18ppm. Pt will open eyes spontaneously and arouse to voice.  Follows commands intermittently, by choice as she will shake her head 'no' when she doesn't want to do what is asked of her.  RASS stable at "0" with Precedex infusion.  CO/CI stable- refer to flow sheets for details.  AV Paced rhythm 75% of jamilah this shift. Hypothermic this shift, requiring Florinda Hugger majority of shift to maintain core body temp of 99.9 as requested by MD. Was coagulopathic (see previous progress notes) during shift due to hypothermia.  2 FFP, 1 Cryo, and 1 PRBC given in response to this.  Morning labs drawn and reflected critical H/H 4.3/12- MD notified and ordered 3 u PRBC; first unit infusing with blood warmer at shift change.  B/P labile- Vasopressin off numerous times during shift and Epi titrated numerous times. Milrinone dose remains stable.  Insulin infusion titrated per nomogram.  Heparin pre-filter off per CVT MD directive due to coagulapathy.  UOP oliguric ranging 0-20ml/hr; most hours 3ml-5ml.  Heavy sanguinous output to pleural chest tubes- see I&O flow sheets for details. R groin puncture site (above vas cath site) with active bleeding and moderate sized clots noted.  Dressing to both this site and vas cath site changed with sterile technique observed.  Pain controlled with Fentanyl x 1 dose.  Status update given to family during shift. Repositioned Q2H as tolerated, safety maintained with RN at bedside throughout shift due to 1:1 status.  "

## 2022-07-21 NOTE — PT/OT/SLP PROGRESS
Occupational Therapy      Patient Name:  Avril Monzon   MRN:  [id number]    EVAL INITIATED VIA CHART REVIEW. 2 ATTEMPTS. PT RECEIVING BLOOD AND INTUBATED AS WELL AS SEDATED. NURSE PLACED PT ON HOLD. WILL FOLLOW UP ON LATER DATE.  1020  [id number]  Steffanie Cerda OT  7/21/2022

## 2022-07-21 NOTE — PLAN OF CARE
Pt sedated to RASS 0 on Precedex gtt. NSR on the monitor. External pacer set to back-up pace at 50bpm. Milrinone, Epinephrine gtts continued. [place]-isabel catheter in place at 45cm. 3 units PRBC, 1 unit Cryo, and 1 unit Platelets given this shift. Repeat Hgb 9.9, Plt 103. Mechanically ventilated on 30% FiO2, 5 PEEP. Nitric Oxide weaned off today. Oxygenating well. PA systolic 50-55. Dr. Locke aware. Failed SBT this afternoon. Plan for SAT/SBT in morning. Mediastinal and Pleural chest tubes to -20mmHg suction with serosanguinous drainage. -50cc to mediastinal and -140cc to pleural chest tubes this shift. OGT in place. Insulin gtt Dc'd. Levemir given. SSI PRN q6h. Pfeiffer catheter in place. Oliguria, 5-25cc/hr. CRRT continued. NS pre-filter. Net UF titrated up to -75hr, tolerating. Bed low, wheels locked, alarms audible. POC reviewed with pt and family today.

## 2022-07-21 NOTE — PT/OT/SLP PROGRESS
Physical Therapy      Patient Name:  Avril Monzon   MRN:  [id number]    Patient not seen today secondary to  ABNORMAL LABS / NSG HOLD / RECEIVING BLOOD. Will follow-up AS APPROPRIATE. THANKS FOR THE CONSULT.

## 2022-07-21 NOTE — PROGRESS NOTES
Dr. Galvan notified of ouput of pleural chest tube since shift change.  At 1900, output at 200ml, at 2000 the output was 440ml and at 2030, the output is 110ml.  Orders received for coagulation labs and to infuse 1 unit of FFP.  Family at bedside, update report provided.

## 2022-07-22 LAB
ALBUMIN SERPL BCP-MCNC: 3.1 G/DL (ref 3.5–5.2)
ALBUMIN SERPL BCP-MCNC: 3.2 G/DL (ref 3.5–5.2)
ALLENS TEST: ABNORMAL
ANION GAP SERPL CALC-SCNC: 10 MMOL/L (ref 8–16)
ANION GAP SERPL CALC-SCNC: 7 MMOL/L (ref 8–16)
ANION GAP SERPL CALC-SCNC: 8 MMOL/L (ref 8–16)
ANION GAP SERPL CALC-SCNC: 9 MMOL/L (ref 8–16)
BASOPHILS # BLD AUTO: 0.02 K/UL (ref 0–0.2)
BASOPHILS NFR BLD: 0.2 % (ref 0–1.9)
BUN SERPL-MCNC: 11 MG/DL (ref 8–23)
BUN SERPL-MCNC: 11 MG/DL (ref 8–23)
BUN SERPL-MCNC: 13 MG/DL (ref 8–23)
BUN SERPL-MCNC: 13 MG/DL (ref 8–23)
CALCIUM SERPL-MCNC: 8.3 MG/DL (ref 8.7–10.5)
CALCIUM SERPL-MCNC: 8.3 MG/DL (ref 8.7–10.5)
CALCIUM SERPL-MCNC: 8.6 MG/DL (ref 8.7–10.5)
CALCIUM SERPL-MCNC: 9 MG/DL (ref 8.7–10.5)
CHLORIDE SERPL-SCNC: 106 MMOL/L (ref 95–110)
CHLORIDE SERPL-SCNC: 106 MMOL/L (ref 95–110)
CHLORIDE SERPL-SCNC: 107 MMOL/L (ref 95–110)
CHLORIDE SERPL-SCNC: 107 MMOL/L (ref 95–110)
CO2 SERPL-SCNC: 21 MMOL/L (ref 23–29)
CO2 SERPL-SCNC: 22 MMOL/L (ref 23–29)
CO2 SERPL-SCNC: 24 MMOL/L (ref 23–29)
CO2 SERPL-SCNC: 24 MMOL/L (ref 23–29)
CREAT SERPL-MCNC: 1.8 MG/DL (ref 0.5–1.4)
CREAT SERPL-MCNC: 1.8 MG/DL (ref 0.5–1.4)
CREAT SERPL-MCNC: 1.9 MG/DL (ref 0.5–1.4)
CREAT SERPL-MCNC: 2 MG/DL (ref 0.5–1.4)
DELSYS: ABNORMAL
DIFFERENTIAL METHOD: ABNORMAL
EOSINOPHIL # BLD AUTO: 0 K/UL (ref 0–0.5)
EOSINOPHIL NFR BLD: 0 % (ref 0–8)
ERYTHROCYTE [DISTWIDTH] IN BLOOD BY AUTOMATED COUNT: 16.1 % (ref 11.5–14.5)
ERYTHROCYTE [SEDIMENTATION RATE] IN BLOOD BY WESTERGREN METHOD: 20 MM/H
EST. GFR  (AFRICAN AMERICAN): 28 ML/MIN/1.73 M^2
EST. GFR  (AFRICAN AMERICAN): 29 ML/MIN/1.73 M^2
EST. GFR  (AFRICAN AMERICAN): 31 ML/MIN/1.73 M^2
EST. GFR  (AFRICAN AMERICAN): 31 ML/MIN/1.73 M^2
EST. GFR  (NON AFRICAN AMERICAN): 24 ML/MIN/1.73 M^2
EST. GFR  (NON AFRICAN AMERICAN): 25 ML/MIN/1.73 M^2
EST. GFR  (NON AFRICAN AMERICAN): 27 ML/MIN/1.73 M^2
EST. GFR  (NON AFRICAN AMERICAN): 27 ML/MIN/1.73 M^2
FIO2: 30
GLUCOSE SERPL-MCNC: 101 MG/DL (ref 70–110)
GLUCOSE SERPL-MCNC: 102 MG/DL (ref 70–110)
GLUCOSE SERPL-MCNC: 189 MG/DL (ref 70–110)
GLUCOSE SERPL-MCNC: 190 MG/DL (ref 70–110)
HCO3 UR-SCNC: 22.9 MMOL/L (ref 24–28)
HCT VFR BLD AUTO: 25.2 % (ref 37–48.5)
HGB BLD-MCNC: 8.9 G/DL (ref 12–16)
IMM GRANULOCYTES # BLD AUTO: 0.06 K/UL (ref 0–0.04)
IMM GRANULOCYTES NFR BLD AUTO: 0.5 % (ref 0–0.5)
LYMPHOCYTES # BLD AUTO: 0.7 K/UL (ref 1–4.8)
LYMPHOCYTES NFR BLD: 6.1 % (ref 18–48)
MAGNESIUM SERPL-MCNC: 2.1 MG/DL (ref 1.6–2.6)
MAGNESIUM SERPL-MCNC: 2.2 MG/DL (ref 1.6–2.6)
MAGNESIUM SERPL-MCNC: 2.3 MG/DL (ref 1.6–2.6)
MAGNESIUM SERPL-MCNC: 2.3 MG/DL (ref 1.6–2.6)
MCH RBC QN AUTO: 29.9 PG (ref 27–31)
MCHC RBC AUTO-ENTMCNC: 35.3 G/DL (ref 32–36)
MCV RBC AUTO: 85 FL (ref 82–98)
MODE: ABNORMAL
MONOCYTES # BLD AUTO: 1.2 K/UL (ref 0.3–1)
MONOCYTES NFR BLD: 10.6 % (ref 4–15)
NEUTROPHILS # BLD AUTO: 9.1 K/UL (ref 1.8–7.7)
NEUTROPHILS NFR BLD: 82.6 % (ref 38–73)
NRBC BLD-RTO: 0 /100 WBC
PCO2 BLDA: 31 MMHG (ref 35–45)
PEEP: 5
PH SMN: 7.48 [PH] (ref 7.35–7.45)
PHOSPHATE SERPL-MCNC: 2.9 MG/DL (ref 2.7–4.5)
PHOSPHATE SERPL-MCNC: 3 MG/DL (ref 2.7–4.5)
PHOSPHATE SERPL-MCNC: 3.6 MG/DL (ref 2.7–4.5)
PLATELET # BLD AUTO: 109 K/UL (ref 150–450)
PLATELET BLD QL SMEAR: ABNORMAL
PMV BLD AUTO: 9.5 FL (ref 9.2–12.9)
PO2 BLDA: 79 MMHG (ref 80–100)
POC BE: -1 MMOL/L
POC SATURATED O2: 97 % (ref 95–100)
POCT GLUCOSE: 103 MG/DL (ref 70–110)
POCT GLUCOSE: 119 MG/DL (ref 70–110)
POCT GLUCOSE: 212 MG/DL (ref 70–110)
POCT GLUCOSE: 212 MG/DL (ref 70–110)
POCT GLUCOSE: 392 MG/DL (ref 70–110)
POCT GLUCOSE: 61 MG/DL (ref 70–110)
POTASSIUM SERPL-SCNC: 4.8 MMOL/L (ref 3.5–5.1)
POTASSIUM SERPL-SCNC: 4.8 MMOL/L (ref 3.5–5.1)
POTASSIUM SERPL-SCNC: 5 MMOL/L (ref 3.5–5.1)
POTASSIUM SERPL-SCNC: 5 MMOL/L (ref 3.5–5.1)
RBC # BLD AUTO: 2.98 M/UL (ref 4–5.4)
SAMPLE: ABNORMAL
SITE: ABNORMAL
SODIUM SERPL-SCNC: 137 MMOL/L (ref 136–145)
SODIUM SERPL-SCNC: 137 MMOL/L (ref 136–145)
SODIUM SERPL-SCNC: 138 MMOL/L (ref 136–145)
SODIUM SERPL-SCNC: 139 MMOL/L (ref 136–145)
VT: 375
WBC # BLD AUTO: 11.08 K/UL (ref 3.9–12.7)

## 2022-07-22 PROCEDURE — 97163 PT EVAL HIGH COMPLEX 45 MIN: CPT

## 2022-07-22 PROCEDURE — 37799 UNLISTED PX VASCULAR SURGERY: CPT

## 2022-07-22 PROCEDURE — C9399 UNCLASSIFIED DRUGS OR BIOLOG: HCPCS | Performed by: THORACIC SURGERY (CARDIOTHORACIC VASCULAR SURGERY)

## 2022-07-22 PROCEDURE — 20000000 HC ICU ROOM

## 2022-07-22 PROCEDURE — 99291 CRITICAL CARE FIRST HOUR: CPT | Mod: ,,,

## 2022-07-22 PROCEDURE — 84100 ASSAY OF PHOSPHORUS: CPT | Performed by: INTERNAL MEDICINE

## 2022-07-22 PROCEDURE — 25000003 PHARM REV CODE 250: Performed by: THORACIC SURGERY (CARDIOTHORACIC VASCULAR SURGERY)

## 2022-07-22 PROCEDURE — 90937 HEMODIALYSIS REPEATED EVAL: CPT | Mod: ,,, | Performed by: INTERNAL MEDICINE

## 2022-07-22 PROCEDURE — 99291 PR CRITICAL CARE, E/M 30-74 MINUTES: ICD-10-PCS | Mod: ,,,

## 2022-07-22 PROCEDURE — 99291 CRITICAL CARE FIRST HOUR: CPT | Mod: 25,,, | Performed by: INTERNAL MEDICINE

## 2022-07-22 PROCEDURE — 99233 SBSQ HOSP IP/OBS HIGH 50: CPT | Mod: ,,, | Performed by: INTERNAL MEDICINE

## 2022-07-22 PROCEDURE — 94799 UNLISTED PULMONARY SVC/PX: CPT

## 2022-07-22 PROCEDURE — 85025 COMPLETE CBC W/AUTO DIFF WBC: CPT | Performed by: THORACIC SURGERY (CARDIOTHORACIC VASCULAR SURGERY)

## 2022-07-22 PROCEDURE — 90945 DIALYSIS ONE EVALUATION: CPT

## 2022-07-22 PROCEDURE — 83735 ASSAY OF MAGNESIUM: CPT | Mod: 91 | Performed by: INTERNAL MEDICINE

## 2022-07-22 PROCEDURE — 82803 BLOOD GASES ANY COMBINATION: CPT

## 2022-07-22 PROCEDURE — 80048 BASIC METABOLIC PNL TOTAL CA: CPT | Performed by: THORACIC SURGERY (CARDIOTHORACIC VASCULAR SURGERY)

## 2022-07-22 PROCEDURE — 63600175 PHARM REV CODE 636 W HCPCS: Performed by: THORACIC SURGERY (CARDIOTHORACIC VASCULAR SURGERY)

## 2022-07-22 PROCEDURE — 99900035 HC TECH TIME PER 15 MIN (STAT)

## 2022-07-22 PROCEDURE — 83735 ASSAY OF MAGNESIUM: CPT | Mod: 91 | Performed by: THORACIC SURGERY (CARDIOTHORACIC VASCULAR SURGERY)

## 2022-07-22 PROCEDURE — 94003 VENT MGMT INPAT SUBQ DAY: CPT

## 2022-07-22 PROCEDURE — 27200667 HC PACEMAKER, TEMPORARY MONITORING, PER SHIFT

## 2022-07-22 PROCEDURE — 80069 RENAL FUNCTION PANEL: CPT | Mod: 91 | Performed by: INTERNAL MEDICINE

## 2022-07-22 PROCEDURE — C9399 UNCLASSIFIED DRUGS OR BIOLOG: HCPCS

## 2022-07-22 PROCEDURE — 27000221 HC OXYGEN, UP TO 24 HOURS

## 2022-07-22 PROCEDURE — 94761 N-INVAS EAR/PLS OXIMETRY MLT: CPT

## 2022-07-22 PROCEDURE — 99291 PR CRITICAL CARE, E/M 30-74 MINUTES: ICD-10-PCS | Mod: 25,,, | Performed by: INTERNAL MEDICINE

## 2022-07-22 PROCEDURE — 99233 PR SUBSEQUENT HOSPITAL CARE,LEVL III: ICD-10-PCS | Mod: ,,, | Performed by: INTERNAL MEDICINE

## 2022-07-22 PROCEDURE — 90937 PR HEMODIALYSIS, REPEATED EVAL.: ICD-10-PCS | Mod: ,,, | Performed by: INTERNAL MEDICINE

## 2022-07-22 PROCEDURE — 25000003 PHARM REV CODE 250

## 2022-07-22 RX ADMIN — CHLORHEXIDINE GLUCONATE 0.12% ORAL RINSE 10 ML: 1.2 LIQUID ORAL at 08:07

## 2022-07-22 RX ADMIN — MAGNESIUM HYDROXIDE 2400 MG: 400 SUSPENSION ORAL at 05:07

## 2022-07-22 RX ADMIN — MUPIROCIN: 20 OINTMENT TOPICAL at 09:07

## 2022-07-22 RX ADMIN — OXYCODONE HYDROCHLORIDE AND ACETAMINOPHEN 500 MG: 500 TABLET ORAL at 08:07

## 2022-07-22 RX ADMIN — MUPIROCIN: 20 OINTMENT TOPICAL at 08:07

## 2022-07-22 RX ADMIN — DEXTROSE 125 ML: 10 SOLUTION INTRAVENOUS at 11:07

## 2022-07-22 RX ADMIN — CHLORHEXIDINE GLUCONATE 0.12% ORAL RINSE 10 ML: 1.2 LIQUID ORAL at 09:07

## 2022-07-22 RX ADMIN — FENTANYL CITRATE 50 MCG: 50 INJECTION INTRAMUSCULAR; INTRAVENOUS at 12:07

## 2022-07-22 RX ADMIN — FENTANYL CITRATE 50 MCG: 50 INJECTION INTRAMUSCULAR; INTRAVENOUS at 05:07

## 2022-07-22 RX ADMIN — INSULIN ASPART 4 UNITS: 100 INJECTION, SOLUTION INTRAVENOUS; SUBCUTANEOUS at 06:07

## 2022-07-22 RX ADMIN — CLOPIDOGREL 75 MG: 75 TABLET, FILM COATED ORAL at 08:07

## 2022-07-22 RX ADMIN — POLYETHYLENE GLYCOL 3350 17 G: 17 POWDER, FOR SOLUTION ORAL at 08:07

## 2022-07-22 RX ADMIN — CYANOCOBALAMIN TAB 1000 MCG 1000 MCG: 1000 TAB at 08:07

## 2022-07-22 RX ADMIN — MILRINONE LACTATE IN DEXTROSE 0.2 MCG/KG/MIN: 200 INJECTION, SOLUTION INTRAVENOUS at 09:07

## 2022-07-22 RX ADMIN — ASPIRIN 81 MG CHEWABLE TABLET 81 MG: 81 TABLET CHEWABLE at 08:07

## 2022-07-22 RX ADMIN — METOPROLOL TARTRATE 25 MG: 25 TABLET, FILM COATED ORAL at 08:07

## 2022-07-22 RX ADMIN — FOLIC ACID 1 MG: 1 TABLET ORAL at 08:07

## 2022-07-22 RX ADMIN — DEXMEDETOMIDINE HYDROCHLORIDE 0.4 MCG/KG/HR: 4 INJECTION INTRAVENOUS at 07:07

## 2022-07-22 RX ADMIN — FAMOTIDINE 20 MG: 20 TABLET ORAL at 08:07

## 2022-07-22 RX ADMIN — METOPROLOL TARTRATE 25 MG: 25 TABLET, FILM COATED ORAL at 09:07

## 2022-07-22 RX ADMIN — FENTANYL CITRATE 50 MCG: 50 INJECTION INTRAMUSCULAR; INTRAVENOUS at 10:07

## 2022-07-22 RX ADMIN — MILRINONE LACTATE IN DEXTROSE 0.38 MCG/KG/MIN: 200 INJECTION, SOLUTION INTRAVENOUS at 01:07

## 2022-07-22 RX ADMIN — INSULIN DETEMIR 5 UNITS: 100 INJECTION, SOLUTION SUBCUTANEOUS at 08:07

## 2022-07-22 RX ADMIN — NEPHROCAP 1 CAPSULE: 1 CAP ORAL at 08:07

## 2022-07-22 NOTE — PROGRESS NOTES
[facility] ([facility])  Cardiology  Progress Note    Patient Name: Avril Monzon  MRN: [id number]  Admission Date: 7/12/2022  Hospital Length of Stay: 10 days  Code Status: Full Code   Attending Physician: Sanju Locke MD   Primary Care Physician: Rose Parks MD  Expected Discharge Date:   Principal Problem:S/P CABG (coronary artery bypass graft)    Subjective:   HPI:  77 y.o. female patient with a PMHx of CKD, CAD s/p cardiac stenting, DM2, heart failure, HLD, HTN, NSTEMI, seizure, tobacco dependence, and CVA presents with NSTEMI and CHF.  EKG NSR, ST depression multiple leads. Initial troponin is 12. Pt being dialyzed today    Hospital Course:   7/14/2022--Patient seen and examined in room, lying in bed. HD in progress. Denies any cardiac complaints today. Awaiting CABG on Monday.     7/15/2022--patient seen and examined in room, denies any complaints on exam today.     7/17/22 No c/o on HD on exam. Patient denies CP, angina or anginal equivalent. CABG tomorrow. Start statin after cabg, continue b blockers    7/18/2022 stable no chest pain . Cabg on hold due equipment need for off pump bypass    7/21/22-Patient seen and examined today, s/p CABG x 2. Increased OP from chest tubes overnight, H/H dipped to 4.3/12.0, being transfused. On pressor support, undergoing CRRT.     7/22/22-Patient seen and examined today, recovering s/p CABG x 2. Extubated, remains weak, complains of nausea and incisional pain. Pressors being weaned. Labs reviewed. H/H 8.9/25.2. Platelets 109,000.        Review of Systems   Constitutional: Positive for malaise/fatigue.   HENT: Negative.     Eyes: Negative.    Cardiovascular:  Positive for chest pain (incisional).   Respiratory: Negative.     Endocrine: Negative.    Hematologic/Lymphatic: Bruises/bleeds easily.   Skin: Negative.    Musculoskeletal: Negative.    Gastrointestinal:  Positive for nausea.   Genitourinary: Negative.    Neurological: Negative.     Psychiatric/Behavioral: Negative.     Allergic/Immunologic: Negative.    Objective:     Vital Signs (Most Recent):  Temp: 98.78 °F (37.1 °C) (07/22/22 1100)  Pulse: 74 (07/22/22 1100)  Resp: (!) 32 (07/22/22 1250)  BP: (!) 108/54 (07/22/22 1100)  SpO2: 100 % (07/22/22 1100)   Vital Signs (24h Range):  Temp:  [98.78 °F (37.1 °C)-100.76 °F (38.2 °C)] 98.78 °F (37.1 °C)  Pulse:  [] 74  Resp:  [0-32] 32  SpO2:  [99 %-100 %] 100 %  BP: ()/(51-67) 108/54  Arterial Line BP: ()/(35-49) 114/47     Weight: 67.6 kg (149 lb 0.5 oz)  Body mass index is 24.8 kg/m².     SpO2: 100 %  O2 Device (Oxygen Therapy): nasal cannula      Intake/Output Summary (Last 24 hours) at 7/22/2022 1255  Last data filed at 7/22/2022 1105  Gross per 24 hour   Intake 6402.25 ml   Output 5951 ml   Net 451.25 ml       Lines/Drains/Airways       Central Venous Catheter Line  Duration                  Hemodialysis Catheter 07/20/22 1348 right femoral 1 day     Introducer with Double Lumen 07/20/22 1400 right internal jugular 1 day    Pulmonary Artery Catheter Assessment  07/20/22 1400 1 day              Drain  Duration                  Hemodialysis AV Fistula 04/01/20 2203 Left upper arm 841 days         Closed/Suction Drain 07/20/22 1154 Left Leg Bulb 19 Fr. 2 days         Urethral Catheter 07/20/22 0740 Silicone;Temperature probe;Straight-tip 16 Fr. 2 days         Y Chest Tube 1 and 2 07/20/22 1204 1 Right Mediastinal 24 Fr. 2 Left Mediastinal 24 Fr. 2 days         Y Chest Tube 3 and 4 07/20/22 1205 3 Left Pleural 24 Fr. 4 Right Pleural 24 Fr. 2 days         NG/OG Tube 07/20/22 1445 Center mouth 1 day              Arterial Line  Duration             Arterial Line 07/20/22 0712 Right Radial 2 days              Line  Duration                  Pacer Wires 07/20/22 1400 1 day              Peripheral Intravenous Line  Duration                  Peripheral IV - Single Lumen 07/18/22 0625 20 G Posterior;Right Wrist 4 days                     Physical Exam  Vitals and nursing note reviewed.   Constitutional:       General: She is not in acute distress.     Appearance: She is well-developed. She is ill-appearing. She is not diaphoretic.   HENT:      Head: Normocephalic and atraumatic.   Eyes:      General:         Right eye: No discharge.         Left eye: No discharge.      Pupils: Pupils are equal, round, and reactive to light.   Neck:      Thyroid: No thyromegaly.      Vascular: No JVD.      Trachea: No tracheal deviation.   Cardiovascular:      Rate and Rhythm: Normal rate and regular rhythm.      Heart sounds: Normal heart sounds, S1 normal and S2 normal. No murmur heard.     Comments: Sternotomy site with dressing in place    Chest tubes in place  Pulmonary:      Effort: Pulmonary effort is normal. No respiratory distress.      Breath sounds: No wheezing.      Comments: Diminished BS at bases  Abdominal:      General: There is no distension.      Tenderness: There is no rebound.   Musculoskeletal:      Cervical back: Neck supple.      Right lower leg: No edema.      Left lower leg: No edema.   Skin:     General: Skin is warm and dry.      Findings: No erythema.   Neurological:      General: No focal deficit present.      Mental Status: She is alert and oriented to person, place, and time.   Psychiatric:         Mood and Affect: Mood normal.         Behavior: Behavior normal.         Thought Content: Thought content normal.       Significant Labs: CMP   Recent Labs   Lab 07/21/22  1316 07/21/22  1711 07/21/22  2157 07/22/22  0421    137 137 137  137   K 3.8 4.2 4.6 5.0  5.0    105 105 106  106   CO2 21* 20* 19* 22*  21*    135* 201* 190*  189*   BUN 15 12 13 11  11   CREATININE 2.3* 2.1* 2.0* 1.9*  1.8*   CALCIUM 8.1* 8.1* 8.3* 8.3*  8.3*   ALBUMIN 3.3*  --  3.3* 3.1*   ANIONGAP 10 12 13 9  10   ESTGFRAFRICA 23* 26* 28* 29*  31*   EGFRNONAA 20* 23* 24* 25*  27*   , CBC   Recent Labs   Lab 07/20/22  1435  07/20/22  1437 07/21/22  0446 07/21/22  1316 07/22/22  0421   WBC 15.94*  --   --  8.90 11.08   HGB 10.9*  --  4.3* 9.9* 8.9*   HCT 31.7*   < > 12.0* 27.4* 25.2*     --   --  65* 109*    < > = values in this interval not displayed.   , Troponin No results for input(s): TROPONINI in the last 48 hours., and All pertinent lab results from the last 24 hours have been reviewed.    Significant Imaging: Echocardiogram: Transthoracic echo (TTE) complete (Cupid Only):   Results for orders placed or performed during the hospital encounter of 07/12/22   Echo   Result Value Ref Range    BSA 1.68 m2    IVC diameter 1.93 cm    Left Ventricular Outflow Tract Mean Velocity 0.090615309387482 cm/s    Left Ventricular Outflow Tract Mean Gradient 1.94 mmHg    LVIDd 4.43 3.5 - 6.0 cm    IVS 1.15 (A) 0.6 - 1.1 cm    Posterior Wall 0.96 0.6 - 1.1 cm    Ao root annulus 2.78 cm    LVIDs 3.44 2.1 - 4.0 cm    FS 22 28 - 44 %    STJ 2.42 cm    Ascending aorta 2.32 cm    LV mass 161.00 g    LA size 3.85 cm    Left Ventricle Relative Wall Thickness 0.43 cm    AV mean gradient 4 mmHg    AV valve area 1.56 cm2    AV Velocity Ratio 0.79     AV index (prosthetic) 0.78     MV valve area p 1/2 method 6.15 cm2    E/A ratio 1.31     E wave deceleration time 123.853030403240689 msec    IVRT 39.429420623314022 msec    LVOT diameter 1.60 cm    LVOT area 2.0 cm2    LVOT peak lio 1.00 m/s    LVOT peak VTI 19.70 cm    Ao peak lio 1.26 m/s    Ao VTI 25.4 cm    RVOT peak lio 0.62 m/s    RVOT peak VTI 13.3 cm    Mr max lio 6.14 m/s    LVOT stroke volume 39.59 cm3    AV peak gradient 6 mmHg    PV mean gradient 0.85 mmHg    MV Peak E Lio 1.11 m/s    TR Max Lio 4.26 m/s    MV stenosis pressure 1/2 time 35.130938322435547 ms    MV Peak A Lio 0.85 m/s    LV Systolic Volume 48.80 mL    LV Systolic Volume Index 29.0 mL/m2    LV Diastolic Volume 88.87 mL    LV Diastolic Volume Index 52.90 mL/m2    LV Mass Index 96 g/m2    RA Major Axis 3.88 cm    Left Atrium  Minor Axis 3.59 cm    Left Atrium Major Axis 4.08 cm    Triscuspid Valve Regurgitation Peak Gradient 73 mmHg    EF 55 %    Narrative    · Concentric hypertrophy and normal systolic function.  · The estimated ejection fraction is 55%.  · Normal left ventricular diastolic function.  · Normal right ventricular size with normal right ventricular systolic   function.  · Moderate tricuspid regurgitation.  · There is pulmonary hypertension.      , EKG: Reviewed, and X-Ray: CXR: X-Ray Chest 1 View (CXR): No results found for this visit on 07/12/22. and X-Ray Chest PA and Lateral (CXR): No results found for this visit on 07/12/22.    Assessment and Plan:   Patient who presents with NSTEMI/multivessel CAD, recovering slowly s/p CABG x 2. Continue current post-op care and mgmt as per CTS.    * S/P CABG (coronary artery bypass graft)  -Continue current post-op care and mgmt as per CTS  -H/H dipped to 4.3/12.0, being transfused  -Wean pressors as tolerated  -ASA, Plavix, BB, statin as condition permits    7/22/22  -Recovering post CABG x 2  -H/H stable this AM   -Resume ASA, Plavix as per CTS  -Continue BB, statin  -IS usage and ambulation when able    NSTEMI (non-ST elevated myocardial infarction)  Plan for CABG on Monday per CTS  Continue ASA BB  Chest pain free    7/18/2022 chest pain free awaiting cabg    7/21/22  -See plan under CABG    Elevated troponin  77 y.o. female patient with a PMHx of CKD, CAD s/p cardiac stenting, DM2, heart failure, HLD, HTN, NSTEMI, seizure, tobacco dependence, and CVA presents with NSTEMI and CHF.  EKG NSR, ST depression multiple leads. Initial troponin is 12. Pt being dialyzed today.    Continue IV heparin, cardiac cath once CHF stable, being dialyzed today    CAD (coronary artery disease)  Has sevre cad 3 vessel low ef for cabg    ESRD (end stage renal disease)  -Mgmt per nephrology    Hypertension associated with diabetes  -Continue BB as BP permits        VTE Risk Mitigation (From admission,  onward)         Ordered     heparin (porcine) injection 4,000 Units  As needed (PRN)         07/20/22 1632     IP VTE HIGH RISK PATIENT  Once         07/20/22 1503     Place sequential compression device  Until discontinued         07/19/22 1700     Place sequential compression device  Until discontinued         07/17/22 2118     Place DYANA hose  Until discontinued         07/12/22 1637     Place sequential compression device  Until discontinued         07/12/22 1637                Moriah Tran PA-C  Cardiology  [facility] ([facility])

## 2022-07-22 NOTE — SUBJECTIVE & OBJECTIVE
Past Medical History:   Diagnosis Date    Acute hypoxemic respiratory failure 11/26/2018    Anemia     Arthritis     back, hand, knees    Back pain     Cataract     CKD stage G4/A3, GFR 15 - 29 and albumin creatinine ratio >300 mg/g     GFR 40% Jan 2014 and 33% ub 3/2014 ([facility])    Coronary artery disease involving native coronary artery of native heart 11/30/2018    Diabetic retinopathy     DM (diabetes mellitus) type II controlled, neurological manifestation     Exudative age-related macular degeneration of left eye with active choroidal neovascularization 2/5/2019    GERD (gastroesophageal reflux disease)     Glaucoma     Heart failure     Hyperlipidemia     Hypertension     Non-ST elevation MI (NSTEMI) 11/28/2018    NSTEMI (non-ST elevated myocardial infarction) 11/27/2018    Peripheral neuropathy     Polyneuropathy     Proteinuria     >6 mo     Seizures     BRG 1/2014 -dick CT NRI showed small vessel    Stroke 2012,2014    Tobacco dependence     Type 2 diabetes with peripheral circulatory disorder, controlled        Past Surgical History:   Procedure Laterality Date    BREAST LUMPECTOMY Left     benign, when patient was 15 years old    BREAST MASS EXCISION      ,benign, age 15.    CATHETERIZATION OF BOTH LEFT AND RIGHT HEART N/A 11/28/2018    Procedure: CATHETERIZATION, HEART, BOTH LEFT AND RIGHT;  Surgeon: Michelle Holman MD;  Location: [facility] CATH LAB;  Service: Cardiology;  Laterality: N/A;    CATHETERIZATION OF BOTH LEFT AND RIGHT HEART N/A 11/30/2018    Procedure: CATHETERIZATION, HEART, BOTH LEFT AND RIGHT;  Surgeon: Michelle Holman MD;  Location: [facility] CATH LAB;  Service: Cardiology;  Laterality: N/A;    CORONARY ARTERY BYPASS GRAFT (CABG) N/A 7/20/2022    Procedure: CORONARY ARTERY BYPASS GRAFT (CABG);  Surgeon: Sanju Locke MD;  Location: [facility] OR;  Service: Cardiothoracic;  Laterality: N/A;  2-VESSEL PUMP ASSIST WITH EPI-AORTIC ULTRASOUND    ENDOSCOPIC HARVEST OF VEIN Left 7/20/2022    Procedure:  SURGICAL PROCUREMENT, VEIN, ENDOSCOPIC;  Surgeon: Sanju Locke MD;  Location: [facility] OR;  Service: Cardiothoracic;  Laterality: Left;    HYSTERECTOMY  1982    INJECTION OF ANESTHETIC AGENT AROUND MULTIPLE INTERCOSTAL NERVES N/A 2022    Procedure: BLOCK, NERVE, INTERCOSTAL, 2 OR MORE;  Surgeon: Sanju Locke MD;  Location: [facility] OR;  Service: Cardiothoracic;  Laterality: N/A;  PARASTERNAL NERVE BLOCK    INSERTION OF SHUNT      LEFT HEART CATHETERIZATION Left 12/3/2018    Procedure: CATHETERIZATION, HEART, LEFT;  Surgeon: Michelle Holman MD;  Location: [facility] CATH LAB;  Service: Cardiology;  Laterality: Left;  LAD ATHERECTOMY STENT/ FEMORAL APPROACH    LEFT HEART CATHETERIZATION Left 2022    Procedure: CATHETERIZATION, HEART, LEFT;  Surgeon: Abhi Sloan MD;  Location: [facility] CATH LAB;  Service: Cardiology;  Laterality: Left;    OOPHORECTOMY      wrist cyst Right 1980s    dorsal wrist cyst       Review of patient's allergies indicates:   Allergen Reactions    Codeine Swelling    Darvon [propoxyphene] Swelling    Atorvastatin      Muscle twitching       Family History       Problem Relation (Age of Onset)    Cancer Maternal Grandmother    Diabetes Mother    Heart disease Mother    Hyperlipidemia Mother    Hypertension Mother    Muscular dystrophy Son          Tobacco Use    Smoking status: Former Smoker     Packs/day: 1.50     Years: 30.00     Pack years: 45.00     Types: Cigarettes     Quit date: 1990     Years since quittin.5    Smokeless tobacco: Former User   Substance and Sexual Activity    Alcohol use: No     Alcohol/week: 0.0 standard drinks    Drug use: No    Sexual activity: Not Currently         Review of Systems   Unable to perform ROS: Intubated   Objective:     Vital Signs (Most Recent):  Temp: 99.14 °F (37.3 °C) (22 08)  Pulse: 80 (22)  Resp: (!) 23 (22)  BP: (!) 114/56 (22)  SpO2: 100 % (22)   Vital Signs (24h Range):  Temp:   [99.14 °F (37.3 °C)-100.76 °F (38.2 °C)] 99.14 °F (37.3 °C)  Pulse:  [] 80  Resp:  [0-31] 23  SpO2:  [100 %] 100 %  BP: ()/(51-67) 114/56  Arterial Line BP: ()/(35-48) 92/35     Weight: 67.6 kg (149 lb 0.5 oz)  Body mass index is 24.8 kg/m².      Intake/Output Summary (Last 24 hours) at 2022 0951  Last data filed at 2022 0900  Gross per 24 hour   Intake 6856.3 ml   Output 6549 ml   Net 307.3 ml       Physical Exam  Vitals and nursing note reviewed.   Constitutional:       General: She is not in acute distress.     Appearance: She is ill-appearing.      Interventions: She is sedated, intubated and restrained.      Comments: Florinda hugger in place   Cardiovascular:      Rate and Rhythm: Normal rate and regular rhythm. No extrasystoles are present.     Pulses:           Radial pulses are 2+ on the right side and 2+ on the left side.        Dorsalis pedis pulses are 2+ on the right side and 2+ on the left side.      Arteriovenous access: Left arteriovenous access is present.     Comments: AV paced  Pulmonary:      Effort: No tachypnea. She is intubated.      Comments: Coarse mechanically ventilated breath sounds  Chest:       Abdominal:      General: There is no distension.   Genitourinary:     Comments: Pfeiffer catheter ~150ml  Musculoskeletal:      Right lower le+ Edema present.      Left lower le+ Edema present.   Skin:     General: Skin is warm and dry.      Capillary Refill: Capillary refill takes 2 to 3 seconds.   Neurological:      Mental Status: She is lethargic.      Comments: Sedated but awakens to verbal stimuli       Vents:  Vent Mode: (S) Spont (22)  Ventilator Initiated: Yes (22 140)  Set Rate: 0 BPM (22)  Vt Set: 375 mL (22)  Pressure Support: 10 cmH20 (22)  PEEP/CPAP: 5 cmH20 (22)  Oxygen Concentration (%): 30 (07/22/22 0800)  Peak Airway Pressure: 15 cmH2O (22 0800)  Plateau Pressure: 20 cmH20 (22  0800)  Total Ve: 8.84 mL (07/22/22 0800)  F/VT Ratio<105 (RSBI): (!) 92.74 (07/22/22 0800)    Lines/Drains/Airways       Central Venous Catheter Line  Duration                  Hemodialysis Catheter 07/20/22 1348 right femoral 1 day     Introducer with Double Lumen 07/20/22 1400 right internal jugular 1 day    Pulmonary Artery Catheter Assessment  07/20/22 1400 1 day              Drain  Duration                  Hemodialysis AV Fistula 04/01/20 2203 Left upper arm 841 days         Urethral Catheter 07/20/22 0740 Silicone;Temperature probe;Straight-tip 16 Fr. 2 days         Closed/Suction Drain 07/20/22 1154 Left Leg Bulb 19 Fr. 1 day         NG/OG Tube 07/20/22 1445 Center mouth 1 day         Y Chest Tube 1 and 2 07/20/22 1204 1 Right Mediastinal 24 Fr. 2 Left Mediastinal 24 Fr. 1 day         Y Chest Tube 3 and 4 07/20/22 1205 3 Left Pleural 24 Fr. 4 Right Pleural 24 Fr. 1 day              Airway  Duration                  Airway - Non-Surgical 07/20/22 2 days              Arterial Line  Duration             Arterial Line 07/20/22 0712 Right Radial 2 days              Line  Duration                  Pacer Wires 07/20/22 1400 1 day              Peripheral Intravenous Line  Duration                  Peripheral IV - Single Lumen 07/18/22 0625 20 G Posterior;Right Wrist 4 days                    Significant Labs:    CBC/Anemia Profile:  Recent Labs   Lab 07/20/22  1435 07/20/22  1437 07/21/22  0446 07/21/22  1316 07/22/22  0421   WBC 15.94*  --   --  8.90 11.08   HGB 10.9*  --  4.3* 9.9* 8.9*   HCT 31.7*   < > 12.0* 27.4* 25.2*     --   --  65* 109*   MCV 88  --   --  84 85   RDW 16.3*  --   --  14.3 16.1*    < > = values in this interval not displayed.        Chemistries:  Recent Labs   Lab 07/21/22  1316 07/21/22  1711 07/21/22  2157 07/22/22  0421 07/22/22  0817    137 137 137  137  --    K 3.8 4.2 4.6 5.0  5.0  --     105 105 106  106  --    CO2 21* 20* 19* 22*  21*  --    BUN 15 12 13 11  11   --    CREATININE 2.3* 2.1* 2.0* 1.9*  1.8*  --    CALCIUM 8.1* 8.1* 8.3* 8.3*  8.3*  --    ALBUMIN 3.3*  --  3.3* 3.1*  --    MG 2.1  --  2.1 2.2 2.1   PHOS 1.9*  --  2.5* 2.9  3.0  --        Bilirubin:   Recent Labs   Lab 07/12/22  1048 07/17/22  2321 07/19/22  2127   BILITOT 0.6 0.6 0.6     Coagulation:   Recent Labs   Lab 07/21/22  0406   INR 1.3*   APTT 39.8*        POCT Glucose:   Recent Labs   Lab 07/21/22  1702 07/21/22  2321 07/22/22  0614   POCTGLUCOSE 139* 212* 212*       All pertinent labs within the past 24 hours have been reviewed.    Significant Imaging:   I have reviewed all pertinent imaging results/findings within the past 24 hours.

## 2022-07-22 NOTE — ASSESSMENT & PLAN NOTE
-Continue current post-op care and mgmt as per CTS  -H/H dipped to 4.3/12.0, being transfused  -Wean pressors as tolerated  -ASA, Plavix, BB, statin as condition permits    7/22/22  -Recovering post CABG x 2  -H/H stable this AM   -Resume ASA, Plavix as per CTS  -Continue BB, statin  -IS usage and ambulation when able

## 2022-07-22 NOTE — ASSESSMENT & PLAN NOTE
- hx of PA pressure 69.17 in 2018  - CTS managing  - PA pressure >70mmHg intraop  - nitric weaned yesterday with PA pressures ~45-50  - CRRT at goal net zero

## 2022-07-22 NOTE — ASSESSMENT & PLAN NOTE
- per CTS mgmt  - Day 2: s/p CABG to    1. pediciled LIMA to LAD   2. Saphenous vein graft to obtuse marginal  - pressors to maintain MAP >65, wean as clinically tolerated  - CT drainage stable  - Cont to monitor CBC  - ASA, plavix, BB, statin as tolerated

## 2022-07-22 NOTE — PROGRESS NOTES
[facility] - Intensive Care ([facility])  Critical Care Medicine  Progress Note    Patient Name: Avril Monzon  MRN: [id number]  Admission Date: 7/12/2022  Hospital Length of Stay: 10 days  Code Status: Full Code  Attending Provider: Sanju Locke MD  Primary Care Provider: Rose Parks MD   Principal Problem: S/P CABG (coronary artery bypass graft)    Subjective:     HPI:  77W pmh ESRD, DM, CAD s/p stent, HFpEF (Gr2 DD), HLD, HTN, NSTEMI, seizure, tobacco dependence, and CVA presents with NSTEMI .  Her hospital course has been complicated by DKA.    In ED, EKG with multiple leads with ST depressions.  Initial Troponin approx 12.  She is undergoing HD for volume mgmt.   She is on the DKA protocol with insulin drip, fluids and serial electrolytes.      Hospital/ICU Course:  7/12: Aspirin.  Heparin drip for NSTEMI.  Insulin drip for DKA.  Undergoing HD.  7/20: Day 0: CABG x2. Arrives to ICU intubated. 2 PRBCs received intra-op. Currently on milrinone, epi, & nitric oxide @ 40ppm.   7/21: Day 1: CABG x 2. Remains intubated. Awakens to physical stimuli and follows commands. Increased output from chest tubes, cryo administered overnight. 1/3 PRBCs currently infusing. Rpt labs pending. Nitric @ 18ppm. Remains on Epi, vaso, milronone. Currently on CRRT   7/22: Day 2- s/p CABG x2. Nitric weaned yesterday afernoon. SBT failed- RR >45-50 while on spontaneous, able to tolerate for ~1 hr, will re-attempt today. Labs stable.        Past Medical History:   Diagnosis Date    Acute hypoxemic respiratory failure 11/26/2018    Anemia     Arthritis     back, hand, knees    Back pain     Cataract     CKD stage G4/A3, GFR 15 - 29 and albumin creatinine ratio >300 mg/g     GFR 40% Jan 2014 and 33% ub 3/2014 ([facility])    Coronary artery disease involving native coronary artery of native heart 11/30/2018    Diabetic retinopathy     DM (diabetes mellitus) type II controlled, neurological manifestation     Exudative age-related  macular degeneration of left eye with active choroidal neovascularization 2/5/2019    GERD (gastroesophageal reflux disease)     Glaucoma     Heart failure     Hyperlipidemia     Hypertension     Non-ST elevation MI (NSTEMI) 11/28/2018    NSTEMI (non-ST elevated myocardial infarction) 11/27/2018    Peripheral neuropathy     Polyneuropathy     Proteinuria     >6 mo     Seizures     [facility] 1/2014 -dick CT NRI showed small vessel    Stroke 2012,2014    Tobacco dependence     Type 2 diabetes with peripheral circulatory disorder, controlled        Past Surgical History:   Procedure Laterality Date    BREAST LUMPECTOMY Left     benign, when patient was 15 years old    BREAST MASS EXCISION      ,benign, age 15.    CATHETERIZATION OF BOTH LEFT AND RIGHT HEART N/A 11/28/2018    Procedure: CATHETERIZATION, HEART, BOTH LEFT AND RIGHT;  Surgeon: Michelle Holman MD;  Location: [facility] CATH LAB;  Service: Cardiology;  Laterality: N/A;    CATHETERIZATION OF BOTH LEFT AND RIGHT HEART N/A 11/30/2018    Procedure: CATHETERIZATION, HEART, BOTH LEFT AND RIGHT;  Surgeon: Michelle Holman MD;  Location: [facility] CATH LAB;  Service: Cardiology;  Laterality: N/A;    CORONARY ARTERY BYPASS GRAFT (CABG) N/A 7/20/2022    Procedure: CORONARY ARTERY BYPASS GRAFT (CABG);  Surgeon: Sanju Locke MD;  Location: [facility] OR;  Service: Cardiothoracic;  Laterality: N/A;  2-VESSEL PUMP ASSIST WITH EPI-AORTIC ULTRASOUND    ENDOSCOPIC HARVEST OF VEIN Left 7/20/2022    Procedure: SURGICAL PROCUREMENT, VEIN, ENDOSCOPIC;  Surgeon: Sanju Locke MD;  Location: [facility] OR;  Service: Cardiothoracic;  Laterality: Left;    HYSTERECTOMY  1982    INJECTION OF ANESTHETIC AGENT AROUND MULTIPLE INTERCOSTAL NERVES N/A 7/20/2022    Procedure: BLOCK, NERVE, INTERCOSTAL, 2 OR MORE;  Surgeon: Sanju Locke MD;  Location: [facility] OR;  Service: Cardiothoracic;  Laterality: N/A;  PARASTERNAL NERVE BLOCK    INSERTION OF SHUNT      LEFT HEART CATHETERIZATION  Left 12/3/2018    Procedure: CATHETERIZATION, HEART, LEFT;  Surgeon: Michelle Holman MD;  Location: [facility] CATH LAB;  Service: Cardiology;  Laterality: Left;  LAD ATHERECTOMY STENT/ FEMORAL APPROACH    LEFT HEART CATHETERIZATION Left 2022    Procedure: CATHETERIZATION, HEART, LEFT;  Surgeon: Abhi Sloan MD;  Location: [facility] CATH LAB;  Service: Cardiology;  Laterality: Left;    OOPHORECTOMY      wrist cyst Right 1980s    dorsal wrist cyst       Review of patient's allergies indicates:   Allergen Reactions    Codeine Swelling    Darvon [propoxyphene] Swelling    Atorvastatin      Muscle twitching       Family History       Problem Relation (Age of Onset)    Cancer Maternal Grandmother    Diabetes Mother    Heart disease Mother    Hyperlipidemia Mother    Hypertension Mother    Muscular dystrophy Son          Tobacco Use    Smoking status: Former Smoker     Packs/day: 1.50     Years: 30.00     Pack years: 45.00     Types: Cigarettes     Quit date: 1990     Years since quittin.5    Smokeless tobacco: Former User   Substance and Sexual Activity    Alcohol use: No     Alcohol/week: 0.0 standard drinks    Drug use: No    Sexual activity: Not Currently         Review of Systems   Unable to perform ROS: Intubated   Objective:     Vital Signs (Most Recent):  Temp: 99.14 °F (37.3 °C) (22 0800)  Pulse: 80 (22 08)  Resp: (!) 23 (22)  BP: (!) 114/56 (22)  SpO2: 100 % (22)   Vital Signs (24h Range):  Temp:  [99.14 °F (37.3 °C)-100.76 °F (38.2 °C)] 99.14 °F (37.3 °C)  Pulse:  [] 80  Resp:  [0-31] 23  SpO2:  [100 %] 100 %  BP: ()/(51-67) 114/56  Arterial Line BP: ()/(35-48) 92/35     Weight: 67.6 kg (149 lb 0.5 oz)  Body mass index is 24.8 kg/m².      Intake/Output Summary (Last 24 hours) at 2022 0951  Last data filed at 2022 0900  Gross per 24 hour   Intake 6856.3 ml   Output 6549 ml   Net 307.3 ml       Physical Exam  Vitals  and nursing note reviewed.   Constitutional:       General: She is not in acute distress.     Appearance: She is ill-appearing.      Interventions: She is sedated, intubated and restrained.      Comments: Florinda hugger in place   Cardiovascular:      Rate and Rhythm: Normal rate and regular rhythm. No extrasystoles are present.     Pulses:           Radial pulses are 2+ on the right side and 2+ on the left side.        Dorsalis pedis pulses are 2+ on the right side and 2+ on the left side.      Arteriovenous access: Left arteriovenous access is present.     Comments: AV paced  Pulmonary:      Effort: No tachypnea. She is intubated.      Comments: Coarse mechanically ventilated breath sounds  Chest:       Abdominal:      General: There is no distension.   Genitourinary:     Comments: Pfeiffer catheter ~150ml  Musculoskeletal:      Right lower le+ Edema present.      Left lower le+ Edema present.   Skin:     General: Skin is warm and dry.      Capillary Refill: Capillary refill takes 2 to 3 seconds.   Neurological:      Mental Status: She is lethargic.      Comments: Sedated but awakens to verbal stimuli       Vents:  Vent Mode: (S) Spont (22)  Ventilator Initiated: Yes (22 1409)  Set Rate: 0 BPM (22)  Vt Set: 375 mL (22)  Pressure Support: 10 cmH20 (22)  PEEP/CPAP: 5 cmH20 (22)  Oxygen Concentration (%): 30 (22)  Peak Airway Pressure: 15 cmH2O (22)  Plateau Pressure: 20 cmH20 (22)  Total Ve: 8.84 mL (22)  F/VT Ratio<105 (RSBI): (!) 92.74 (22)    Lines/Drains/Airways       Central Venous Catheter Line  Duration                  Hemodialysis Catheter 22 1348 right femoral 1 day     Introducer with Double Lumen 22 1400 right internal jugular 1 day    Pulmonary Artery Catheter Assessment  22 1400 1 day              Drain  Duration                  Hemodialysis AV Fistula 20 0990  Left upper arm 841 days         Urethral Catheter 07/20/22 0740 Silicone;Temperature probe;Straight-tip 16 Fr. 2 days         Closed/Suction Drain 07/20/22 1154 Left Leg Bulb 19 Fr. 1 day         NG/OG Tube 07/20/22 1445 Center mouth 1 day         Y Chest Tube 1 and 2 07/20/22 1204 1 Right Mediastinal 24 Fr. 2 Left Mediastinal 24 Fr. 1 day         Y Chest Tube 3 and 4 07/20/22 1205 3 Left Pleural 24 Fr. 4 Right Pleural 24 Fr. 1 day              Airway  Duration                  Airway - Non-Surgical 07/20/22 2 days              Arterial Line  Duration             Arterial Line 07/20/22 0712 Right Radial 2 days              Line  Duration                  Pacer Wires 07/20/22 1400 1 day              Peripheral Intravenous Line  Duration                  Peripheral IV - Single Lumen 07/18/22 0625 20 G Posterior;Right Wrist 4 days                    Significant Labs:    CBC/Anemia Profile:  Recent Labs   Lab 07/20/22  1435 07/20/22  1437 07/21/22  0446 07/21/22  1316 07/22/22  0421   WBC 15.94*  --   --  8.90 11.08   HGB 10.9*  --  4.3* 9.9* 8.9*   HCT 31.7*   < > 12.0* 27.4* 25.2*     --   --  65* 109*   MCV 88  --   --  84 85   RDW 16.3*  --   --  14.3 16.1*    < > = values in this interval not displayed.        Chemistries:  Recent Labs   Lab 07/21/22  1316 07/21/22  1711 07/21/22  2157 07/22/22  0421 07/22/22  0817    137 137 137  137  --    K 3.8 4.2 4.6 5.0  5.0  --     105 105 106  106  --    CO2 21* 20* 19* 22*  21*  --    BUN 15 12 13 11  11  --    CREATININE 2.3* 2.1* 2.0* 1.9*  1.8*  --    CALCIUM 8.1* 8.1* 8.3* 8.3*  8.3*  --    ALBUMIN 3.3*  --  3.3* 3.1*  --    MG 2.1  --  2.1 2.2 2.1   PHOS 1.9*  --  2.5* 2.9  3.0  --        Bilirubin:   Recent Labs   Lab 07/12/22  1048 07/17/22  2321 07/19/22 2127   BILITOT 0.6 0.6 0.6     Coagulation:   Recent Labs   Lab 07/21/22  0406   INR 1.3*   APTT 39.8*        POCT Glucose:   Recent Labs   Lab 07/21/22  1702 07/21/22 2321  07/22/22  0614   POCTGLUCOSE 139* 212* 212*       All pertinent labs within the past 24 hours have been reviewed.    Significant Imaging:   I have reviewed all pertinent imaging results/findings within the past 24 hours.      ABG  Recent Labs   Lab 07/22/22  0421   PH 7.476*   PO2 79*   PCO2 31.0*   HCO3 22.9*   BE -1     Assessment/Plan:     Pulmonary  On mechanically assisted ventilation  - per CTS mgmt, will assist   - Intubated 7/20  - BP tenuous on two vasopressors, will attempt to wean as clinically tolerating  - VAP prophylaxis  - daily SAT/SBT   - vent settings reviewed for optimal ventilation    Pulmonary hypertension  - hx of PA pressure 69.17 in 2018  - CTS managing  - PA pressure >70mmHg intraop  - nitric weaned yesterday with PA pressures ~45-50  - CRRT at goal net zero    Cardiac/Vascular  * S/P CABG (coronary artery bypass graft)  - per CTS mgmt  - Day 2: s/p CABG to    1. pediciled LIMA to LAD   2. Saphenous vein graft to obtuse marginal  - pressors to maintain MAP >65, wean as clinically tolerated  - CT drainage stable  - Cont to monitor CBC  - ASA, plavix, BB, statin as tolerated     Elevated troponin  Due to NSTEMI  See assessment and plan for NSTEMI    Hypertension associated with diabetes  - per cards/CTS mgmt  - requiring pressor support for hemodynamic, suggest holding BB until more normotensive or SBP >90 and MAP >65 without pressors     CAD (coronary artery disease)  - CTS mgmt  - Day 1: s/p CABG   - 7/13 LHC:  · The pre-procedure left ventricular end diastolic pressure was 31.  · The post-procedure left ventricular end diastolic pressure was 32.  · The ejection fraction was calculated to be 40%.  · The Prox RCA to Mid RCA lesion was 100% stenosed.  · The Ost Cx to Prox Cx lesion was 99% stenosed.  · The Prox LAD to Mid LAD lesion was 75% stenosed.  · The estimated blood loss was none.  · There was three vessel coronary artery disease.    NSTEMI (non-ST elevated myocardial infarction)  - see  s/p CABG assessment & plan    Renal/  Metabolic acidosis  - BE improved, HCO3 22.9  - cont to monitor         ESRD (end stage renal disease)  - per nephrology mgmt  - CRRT remains on at  goal net of zero  - monitor BMP       Critical Care Daily Checklist:    A: Awake: RASS Goal/Actual Goal: RASS Goal: 0-->alert and calm  Actual: Lentz Agitation Sedation Scale (RASS): Alert and calm   B: Spontaneous Breathing Trial Performed? Spon. Breathing Trial Initiated?: Initiated (07/22/22 0800)   C: SAT & SBT Coordinated?  S/p CABG                      D: Delirium: CAM-ICU Overall CAM-ICU: Negative   E: Early Mobility Performed? No- per CTS    F: Feeding Goal: Goals: Patient to tolerate cardiac, renal oral diet.  Status: Nutrition Goal Status: new   Current Diet Order   Procedures    Diet NPO Except for: Sips with Medication     Order Specific Question:   Except for     Answer:   Sips with Medication      AS: Analgesia/Sedation Reviewed- CTS mgmt   T: Thromboembolic Prophylaxis SCDs- CTS mgmt   H: HOB > 300 No   U: Stress Ulcer Prophylaxis (if needed) Famotodine- CTS mgmt   G: Glucose Control Insulin   B: Bowel Function Stool Occurrence: 1   I: Indwelling Catheter (Lines & Pfeiffer) Necessity Reviewed maintain for necessity   D: De-escalation of Antimicrobials/Pharmacotherapies Reviewed- CTS mgmt    Plan for the day/ETD Stabilize and monitor hemodynamics, wean off pressor support as tolerated    Code Status:  Family/Goals of Care: Full Code  Elizabeth (daughter) updated on plan of care   I have discussed case and plan of care in detail with Dr. Locke; Status and plan of care discussed and will further discuss with team on multidisciplinary rounds.  Critical Care Time: 61 minutes  Critical secondary to Patient has a condition that poses threat to life and bodily function: s/p CABG, on mechanically assisted ventilation  Patient is currently on drug therapy requiring intensive monitoring for toxicity: epinephrine, insulin,  [facility]      Critical care was time spent personally by me on the following activities: development of treatment plan with patient or surrogate and bedside caregivers, discussions with consultants, evaluation of patient's response to treatment, examination of patient, ordering and performing treatments and interventions, ordering and review of laboratory studies, ordering and review of radiographic studies, pulse oximetry, re-evaluation of patient's condition. This critical care time did not overlap with that of any other provider or involve time for any procedures.     Loren Stevenson NP  Critical Care Medicine  Critical access hospital - Intensive Care [facility])

## 2022-07-22 NOTE — ASSESSMENT & PLAN NOTE
76 y/o female with ESRD on chronic HD presented with acute coronary syndrome:     ESRD (end stage renal disease)  ESRD pt on chronic HD q MWF at [facility]  On CRRT since CABG on 7/20/22  Tolerating CRRT well, continue dialysis  Procedure reviewed with ICU and dialysis nurses  K normal  Na normal  Metabolic acidosis stable  Intubated   Pt was visited multiple times in ICU     HTN: BP stable  Meds reviewed     Anemia: mild, normocytic  On epogen with HD      * Acute coronary events  NSTEMI  Troponin very high  S/p CABG on 7/20/22  Reviewed the chart  Discussed with CT surgery      Type 2 diabetes mellitus with hyperglycemia, with long-term current use of insulin  Reviewed the chart.        Plans and recommendations:  As discussed above  Total critical care time spent 40 minutes including time needed to review the records, the   patient evaluation, documentation, face-to-face discussion with the patient,   more than 50% of the time was spent on coordination of care and counseling.   Multiple medical issues were addressed, as documented. Medical care provided was in addition to providing dialysis. Pt received multiple visits and evaluations.

## 2022-07-22 NOTE — PLAN OF CARE
SEE EVAL FOR DETAILS, PT DISPLAYED DEFICITS WITH ADL'S SKILLS, DECREASE FUNCTIONAL MOBILITY/ TRANSFERS AND DECREASE B UE STRENGTH/ ROM/ ENDURANCE

## 2022-07-22 NOTE — SUBJECTIVE & OBJECTIVE
Review of Systems   Constitutional: Positive for malaise/fatigue.   HENT: Negative.     Eyes: Negative.    Cardiovascular:  Positive for chest pain (incisional).   Respiratory: Negative.     Endocrine: Negative.    Hematologic/Lymphatic: Bruises/bleeds easily.   Skin: Negative.    Musculoskeletal: Negative.    Gastrointestinal:  Positive for nausea.   Genitourinary: Negative.    Neurological: Negative.    Psychiatric/Behavioral: Negative.     Allergic/Immunologic: Negative.    Objective:     Vital Signs (Most Recent):  Temp: 98.78 °F (37.1 °C) (07/22/22 1100)  Pulse: 74 (07/22/22 1100)  Resp: (!) 32 (07/22/22 1250)  BP: (!) 108/54 (07/22/22 1100)  SpO2: 100 % (07/22/22 1100)   Vital Signs (24h Range):  Temp:  [98.78 °F (37.1 °C)-100.76 °F (38.2 °C)] 98.78 °F (37.1 °C)  Pulse:  [] 74  Resp:  [0-32] 32  SpO2:  [99 %-100 %] 100 %  BP: ()/(51-67) 108/54  Arterial Line BP: ()/(35-49) 114/47     Weight: 67.6 kg (149 lb 0.5 oz)  Body mass index is 24.8 kg/m².     SpO2: 100 %  O2 Device (Oxygen Therapy): nasal cannula      Intake/Output Summary (Last 24 hours) at 7/22/2022 1255  Last data filed at 7/22/2022 1105  Gross per 24 hour   Intake 6402.25 ml   Output 5951 ml   Net 451.25 ml       Lines/Drains/Airways       Central Venous Catheter Line  Duration                  Hemodialysis Catheter 07/20/22 1348 right femoral 1 day     Introducer with Double Lumen 07/20/22 1400 right internal jugular 1 day    Pulmonary Artery Catheter Assessment  07/20/22 1400 1 day              Drain  Duration                  Hemodialysis AV Fistula 04/01/20 2203 Left upper arm 841 days         Closed/Suction Drain 07/20/22 1154 Left Leg Bulb 19 Fr. 2 days         Urethral Catheter 07/20/22 0740 Silicone;Temperature probe;Straight-tip 16 Fr. 2 days         Y Chest Tube 1 and 2 07/20/22 1204 1 Right Mediastinal 24 Fr. 2 Left Mediastinal 24 Fr. 2 days         Y Chest Tube 3 and 4 07/20/22 1205 3 Left Pleural 24 Fr. 4 Right  Pleural 24 Fr. 2 days         NG/OG Tube 07/20/22 1445 Center mouth 1 day              Arterial Line  Duration             Arterial Line 07/20/22 0712 Right Radial 2 days              Line  Duration                  Pacer Wires 07/20/22 1400 1 day              Peripheral Intravenous Line  Duration                  Peripheral IV - Single Lumen 07/18/22 0625 20 G Posterior;Right Wrist 4 days                    Physical Exam  Vitals and nursing note reviewed.   Constitutional:       General: She is not in acute distress.     Appearance: She is well-developed. She is ill-appearing. She is not diaphoretic.   HENT:      Head: Normocephalic and atraumatic.   Eyes:      General:         Right eye: No discharge.         Left eye: No discharge.      Pupils: Pupils are equal, round, and reactive to light.   Neck:      Thyroid: No thyromegaly.      Vascular: No JVD.      Trachea: No tracheal deviation.   Cardiovascular:      Rate and Rhythm: Normal rate and regular rhythm.      Heart sounds: Normal heart sounds, S1 normal and S2 normal. No murmur heard.     Comments: Sternotomy site with dressing in place    Chest tubes in place  Pulmonary:      Effort: Pulmonary effort is normal. No respiratory distress.      Breath sounds: No wheezing.      Comments: Diminished BS at bases  Abdominal:      General: There is no distension.      Tenderness: There is no rebound.   Musculoskeletal:      Cervical back: Neck supple.      Right lower leg: No edema.      Left lower leg: No edema.   Skin:     General: Skin is warm and dry.      Findings: No erythema.   Neurological:      General: No focal deficit present.      Mental Status: She is alert and oriented to person, place, and time.   Psychiatric:         Mood and Affect: Mood normal.         Behavior: Behavior normal.         Thought Content: Thought content normal.       Significant Labs: CMP   Recent Labs   Lab 07/21/22  1316 07/21/22  1711 07/21/22  2157 07/22/22  0421    137 137  137  137   K 3.8 4.2 4.6 5.0  5.0    105 105 106  106   CO2 21* 20* 19* 22*  21*    135* 201* 190*  189*   BUN 15 12 13 11  11   CREATININE 2.3* 2.1* 2.0* 1.9*  1.8*   CALCIUM 8.1* 8.1* 8.3* 8.3*  8.3*   ALBUMIN 3.3*  --  3.3* 3.1*   ANIONGAP 10 12 13 9  10   ESTGFRAFRICA 23* 26* 28* 29*  31*   EGFRNONAA 20* 23* 24* 25*  27*   , CBC   Recent Labs   Lab 07/20/22  1435 07/20/22  1437 07/21/22  0446 07/21/22  1316 07/22/22  0421   WBC 15.94*  --   --  8.90 11.08   HGB 10.9*  --  4.3* 9.9* 8.9*   HCT 31.7*   < > 12.0* 27.4* 25.2*     --   --  65* 109*    < > = values in this interval not displayed.   , Troponin No results for input(s): TROPONINI in the last 48 hours., and All pertinent lab results from the last 24 hours have been reviewed.    Significant Imaging: Echocardiogram: Transthoracic echo (TTE) complete (Cupid Only):   Results for orders placed or performed during the hospital encounter of 07/12/22   Echo   Result Value Ref Range    BSA 1.68 m2    IVC diameter 1.93 cm    Left Ventricular Outflow Tract Mean Velocity 0.570865637062453 cm/s    Left Ventricular Outflow Tract Mean Gradient 1.94 mmHg    LVIDd 4.43 3.5 - 6.0 cm    IVS 1.15 (A) 0.6 - 1.1 cm    Posterior Wall 0.96 0.6 - 1.1 cm    Ao root annulus 2.78 cm    LVIDs 3.44 2.1 - 4.0 cm    FS 22 28 - 44 %    STJ 2.42 cm    Ascending aorta 2.32 cm    LV mass 161.00 g    LA size 3.85 cm    Left Ventricle Relative Wall Thickness 0.43 cm    AV mean gradient 4 mmHg    AV valve area 1.56 cm2    AV Velocity Ratio 0.79     AV index (prosthetic) 0.78     MV valve area p 1/2 method 6.15 cm2    E/A ratio 1.31     E wave deceleration time [id number] msec    IVRT 39.057132234902316 msec    LVOT diameter 1.60 cm    LVOT area 2.0 cm2    LVOT peak jigar 1.00 m/s    LVOT peak VTI 19.70 cm    Ao peak jigar 1.26 m/s    Ao VTI 25.4 cm    RVOT peak jigar 0.62 m/s    RVOT peak VTI 13.3 cm    Mr max jigar 6.14 m/s    LVOT stroke volume 39.59 cm3    AV  peak gradient 6 mmHg    PV mean gradient 0.85 mmHg    MV Peak E Lio 1.11 m/s    TR Max Lio 4.26 m/s    MV stenosis pressure 1/2 time [id number] ms    MV Peak A Lio 0.85 m/s    LV Systolic Volume 48.80 mL    LV Systolic Volume Index 29.0 mL/m2    LV Diastolic Volume 88.87 mL    LV Diastolic Volume Index 52.90 mL/m2    LV Mass Index 96 g/m2    RA Major Axis 3.88 cm    Left Atrium Minor Axis 3.59 cm    Left Atrium Major Axis 4.08 cm    Triscuspid Valve Regurgitation Peak Gradient 73 mmHg    EF 55 %    Narrative    · Concentric hypertrophy and normal systolic function.  · The estimated ejection fraction is 55%.  · Normal left ventricular diastolic function.  · Normal right ventricular size with normal right ventricular systolic   function.  · Moderate tricuspid regurgitation.  · There is pulmonary hypertension.      , EKG: Reviewed, and X-Ray: CXR: X-Ray Chest 1 View (CXR): No results found for this visit on 07/12/22. and X-Ray Chest PA and Lateral (CXR): No results found for this visit on 07/12/22.

## 2022-07-22 NOTE — PLAN OF CARE
Problem: Adult Inpatient Plan of Care  Goal: Plan of Care Review  Outcome: Ongoing, Progressing  Goal: Patient-Specific Goal (Individualized)  Outcome: Ongoing, Progressing  Goal: Absence of Hospital-Acquired Illness or Injury  Outcome: Ongoing, Progressing  Goal: Optimal Comfort and Wellbeing  Outcome: Ongoing, Progressing  Goal: Readiness for Transition of Care  Outcome: Ongoing, Progressing     Problem: Device-Related Complication Risk (Hemodialysis)  Goal: Safe, Effective Therapy Delivery  Outcome: Ongoing, Progressing     Problem: Hemodynamic Instability (Hemodialysis)  Goal: Effective Tissue Perfusion  Outcome: Ongoing, Progressing     Problem: Infection (Hemodialysis)  Goal: Absence of Infection Signs and Symptoms  Outcome: Ongoing, Progressing     Problem: Diabetic Ketoacidosis  Goal: Fluid and Electrolyte Balance with Absence of Ketosis  Outcome: Ongoing, Progressing     Problem: Diabetes Comorbidity  Goal: Blood Glucose Level Within Targeted Range  Outcome: Ongoing, Progressing     Problem: Skin Injury Risk Increased  Goal: Skin Health and Integrity  Outcome: Ongoing, Progressing     Problem: Fall Injury Risk  Goal: Absence of Fall and Fall-Related Injury  Outcome: Ongoing, Progressing     Problem: Infection  Goal: Absence of Infection Signs and Symptoms  Outcome: Ongoing, Progressing     Problem: Device-Related Complication Risk (CRRT (Continuous Renal Replacement Therapy))  Goal: Safe, Effective Therapy Delivery  Outcome: Ongoing, Progressing     Problem: Hypothermia (CRRT (Continuous Renal Replacement Therapy))  Goal: Body Temperature Maintained in Desired Range  Outcome: Ongoing, Progressing     Problem: Infection (CRRT (Continuous Renal Replacement Therapy))  Goal: Absence of Infection Signs and Symptoms  Outcome: Ongoing, Progressing     Problem: Communication Impairment (Mechanical Ventilation, Invasive)  Goal: Effective Communication  Outcome: Ongoing, Progressing     Problem: Device-Related  Complication Risk (Mechanical Ventilation, Invasive)  Goal: Optimal Device Function  Outcome: Ongoing, Progressing     Problem: Inability to Wean (Mechanical Ventilation, Invasive)  Goal: Mechanical Ventilation Liberation  Outcome: Ongoing, Progressing     Problem: Nutrition Impairment (Mechanical Ventilation, Invasive)  Goal: Optimal Nutrition Delivery  Outcome: Ongoing, Progressing     Problem: Skin and Tissue Injury (Mechanical Ventilation, Invasive)  Goal: Absence of Device-Related Skin and Tissue Injury  Outcome: Ongoing, Progressing     Problem: Ventilator-Induced Lung Injury (Mechanical Ventilation, Invasive)  Goal: Absence of Ventilator-Induced Lung Injury  Outcome: Ongoing, Progressing     Problem: Communication Impairment (Artificial Airway)  Goal: Effective Communication  Outcome: Ongoing, Progressing     Problem: Device-Related Complication Risk (Artificial Airway)  Goal: Optimal Device Function  Outcome: Ongoing, Progressing     Problem: Skin and Tissue Injury (Artificial Airway)  Goal: Absence of Device-Related Skin or Tissue Injury  Outcome: Ongoing, Progressing     Problem: Restraint, Nonbehavioral (Nonviolent)  Goal: Absence of Harm or Injury  Outcome: Ongoing, Progressing

## 2022-07-22 NOTE — PLAN OF CARE
Eval and tx completed. Pt more alert and able to follow commands, demonstrated BLE AROM. Bed mobility is total assist at this time, limited by fem line. Rec's TBD.

## 2022-07-22 NOTE — PT/OT/SLP EVAL
Physical Therapy Evaluation    Patient Name:  Avril Monzon   MRN:  [id number]    Recommendations:     Discharge Recommendations:   (TBD with further assessment)   Discharge Equipment Recommendations:  (TBD with further assessment)   Barriers to discharge: None    Assessment:     Avril Monzon is a 77 y.o. female admitted with a medical diagnosis of S/P CABG (coronary artery bypass graft).  She presents with the following impairments/functional limitations:  weakness, impaired functional mobility, visual deficits, pain, decreased safety awareness, edema which negatively impacts safe functional mobility.    Rehab Prognosis: Good based on pt's prior level of independence; patient would benefit from acute skilled PT services to address these deficits and reach maximum level of function.    Recent Surgery: Procedure(s) (LRB):  CORONARY ARTERY BYPASS GRAFT (CABG) (N/A)  ECHOCARDIOGRAM,TRANSESOPHAGEAL (N/A)  BLOCK, NERVE, INTERCOSTAL, 2 OR MORE (N/A)  SURGICAL PROCUREMENT, VEIN, ENDOSCOPIC (Left)  INSERTION, VASCULAR ACCESS CATHETER (N/A) 2 Days Post-Op    Plan:     During this hospitalization, patient to be seen  (3-5x/week) to address the identified rehab impairments via gait training, therapeutic activities, therapeutic exercises, wheelchair management/training, neuromuscular re-education and progress toward the following goals:    · Plan of Care Expires:  08/05/22    Subjective     Chief Complaint: acute coronary syndrome  Patient/Family Comments/goals: to return home/garden  Pain/Comfort:  Pain Rating 1: 0/10 (pain to BUE with movement secondary to arthritis)  Pain Addressed 1: Reposition, Cessation of Activity    Patients cultural, spiritual, Jewish conflicts given the current situation:      Living Environment:  Pt lives with daughter in [facility]  Prior to admission, patients level of function was independent.  Equipment used at home: none.  DME owned (not currently used): none, pt's  has RW and cane.  Upon  discharge, patient will have assistance from family.    Objective:     Communicated with nurse ROSE prior to session.  Patient found supine with arterial line, blood pressure cuff, THIERRY drain, peripheral IV, pulse ox (continuous), telemetry  upon PT entry to room.    General Precautions: Standard, sternal   Orthopedic Precautions:N/A   Braces: N/A  Respiratory Status: Nasal cannula, flow 4 L/min    Exams:  · RLE ROM: impaired  · RLE Strength: impaired  · LLE ROM: impaired  · LLE Strength: impaired    Functional Mobility:  · Bed Mobility:     · Rolling Left:  total assistance  · Rolling Right: total assistance  · Supine to Sit: total assistance  · Sit to Supine: total assistance      Therapeutic Activities and Exercises:   Facilitated supine AROM and therex including AP, heel slides (through decreased ROM) glut sets    AM-PAC 6 CLICK MOBILITY  Total Score:8     Patient left supine with all lines intact, call button in reach, nsg notified and daughter present.    GOALS:   Multidisciplinary Problems     Physical Therapy Goals        Problem: Physical Therapy    Goal Priority Disciplines Outcome Goal Variances Interventions   Physical Therapy Goal     PT, PT/OT      Description: LTGs to be met in 14 days time (8/5/22)  Pt will perform bed mobility with SUP in order to participate in EOB activity.  Pt will tolerate sitting EOB x 10 mins with SUP in order to participate in daily tasks.   Pt will tolerate siting OOB x 3-4 hours in order to participate in daily tasks.   Pt will ambulate 100ft SUP in order to safely navigate surroundings.                    History:     Past Medical History:   Diagnosis Date    Acute hypoxemic respiratory failure 11/26/2018    Anemia     Arthritis     back, hand, knees    Back pain     Cataract     CKD stage G4/A3, GFR 15 - 29 and albumin creatinine ratio >300 mg/g     GFR 40% Jan 2014 and 33% ub 3/2014 ([facility])    Coronary artery disease involving native coronary artery of native heart  11/30/2018    Diabetic retinopathy     DM (diabetes mellitus) type II controlled, neurological manifestation     Exudative age-related macular degeneration of left eye with active choroidal neovascularization 2/5/2019    GERD (gastroesophageal reflux disease)     Glaucoma     Heart failure     Hyperlipidemia     Hypertension     Non-ST elevation MI (NSTEMI) 11/28/2018    NSTEMI (non-ST elevated myocardial infarction) 11/27/2018    Peripheral neuropathy     Polyneuropathy     Proteinuria     >6 mo     Seizures     [facility]G 1/2014 -dick CT NRI showed small vessel    Stroke 2012,2014    Tobacco dependence     Type 2 diabetes with peripheral circulatory disorder, controlled        Past Surgical History:   Procedure Laterality Date    BREAST LUMPECTOMY Left     benign, when patient was 15 years old    BREAST MASS EXCISION      ,benign, age 15.    CATHETERIZATION OF BOTH LEFT AND RIGHT HEART N/A 11/28/2018    Procedure: CATHETERIZATION, HEART, BOTH LEFT AND RIGHT;  Surgeon: Michelle Holman MD;  Location: [facility] CATH LAB;  Service: Cardiology;  Laterality: N/A;    CATHETERIZATION OF BOTH LEFT AND RIGHT HEART N/A 11/30/2018    Procedure: CATHETERIZATION, HEART, BOTH LEFT AND RIGHT;  Surgeon: Michelle Holman MD;  Location: [facility] CATH LAB;  Service: Cardiology;  Laterality: N/A;    CORONARY ARTERY BYPASS GRAFT (CABG) N/A 7/20/2022    Procedure: CORONARY ARTERY BYPASS GRAFT (CABG);  Surgeon: Sanju Locke MD;  Location: [facility] OR;  Service: Cardiothoracic;  Laterality: N/A;  2-VESSEL PUMP ASSIST WITH EPI-AORTIC ULTRASOUND    ENDOSCOPIC HARVEST OF VEIN Left 7/20/2022    Procedure: SURGICAL PROCUREMENT, VEIN, ENDOSCOPIC;  Surgeon: Sanju Locke MD;  Location: [facility] OR;  Service: Cardiothoracic;  Laterality: Left;    HYSTERECTOMY  1982    INJECTION OF ANESTHETIC AGENT AROUND MULTIPLE INTERCOSTAL NERVES N/A 7/20/2022    Procedure: BLOCK, NERVE, INTERCOSTAL, 2 OR MORE;  Surgeon: Sanju Locke MD;  Location:  [facility] OR;  Service: Cardiothoracic;  Laterality: N/A;  PARASTERNAL NERVE BLOCK    INSERTION OF SHUNT      LEFT HEART CATHETERIZATION Left 12/3/2018    Procedure: CATHETERIZATION, HEART, LEFT;  Surgeon: Michelle Holman MD;  Location: [facility] CATH LAB;  Service: Cardiology;  Laterality: Left;  LAD ATHERECTOMY STENT/ FEMORAL APPROACH    LEFT HEART CATHETERIZATION Left 7/13/2022    Procedure: CATHETERIZATION, HEART, LEFT;  Surgeon: Abhi Sloan MD;  Location: [facility] CATH LAB;  Service: Cardiology;  Laterality: Left;    OOPHORECTOMY      wrist cyst Right 1980s    dorsal wrist cyst       Time Tracking:     PT Received On: 07/22/22  PT Start Time: 1100     PT Stop Time: 1120  PT Total Time (min): 20 min     Billable Minutes: Evaluation 1      07/22/2022

## 2022-07-22 NOTE — PROGRESS NOTES
[facility] ([facility])  Nephrology  Progress Note    Patient Name: Avril Monzon  MRN: [id number]  Admission Date: 7/12/2022  Hospital Length of Stay: 10 days  Attending Provider: Sanju Locke MD   Primary Care Physician: Rose Parks MD  Principal Problem:S/P CABG (coronary artery bypass graft)    Subjective:     HPI: Noted    Interval History: Pt was seen and examined in ICU. Labs and meds reviewed. Discussed with other providers. Has tolerated CRRT well. No new issues. Noted lined clotted this afternoon.    Review of patient's allergies indicates:   Allergen Reactions    Codeine Swelling    Darvon [propoxyphene] Swelling    Atorvastatin      Muscle twitching     Current Facility-Administered Medications   Medication Frequency    0.9%  NaCl infusion (CRRT USE ONLY) Continuous    acetaminophen oral solution 650 mg Q6H PRN    albumin human 5% bottle 25 g PRN    ascorbic acid (vitamin C) tablet 500 mg Daily    aspirin chewable tablet 81 mg Daily    calcium gluconate 1 g in NS IVPB (premixed) Once    chlorhexidine 0.12 % solution 10 mL BID    clopidogreL tablet 75 mg Daily    cyanocobalamin tablet 1,000 mcg Daily    dexmedetomidine (PRECEDEX) 400mcg/100mL dextrose 5% infusion Continuous    dextrose 10% bolus 125 mL PRN    EPINEPHrine (ADRENALIN) 5 mg in dextrose 5 % 250 mL infusion Continuous    famotidine tablet 20 mg Daily    fentaNYL 50 mcg/mL injection 50 mcg Q1H PRN    folic acid tablet 1 mg Daily    glucagon (human recombinant) injection 1 mg PRN    heparin (porcine) injection 4,000 Units PRN    insulin aspart U-100 pen 1-10 Units Q6H PRN    insulin detemir U-100 pen 5 Units BID    lactated ringers infusion PRN    magnesium hydroxide 400 mg/5 ml suspension 2,400 mg Daily    magnesium sulfate 2g in water 50mL IVPB (premix) PRN    metoprolol tartrate (LOPRESSOR) tablet 25 mg BID    milrinone 20mg in D5W 100 mL infusion Continuous    mupirocin 2 % ointment BID     niCARdipine 40 mg/200 mL (0.2 mg/mL) infusion Continuous    nitric oxide gas Gas 40 ppm Continuous    ondansetron injection 4 mg Q12H PRN    polyethylene glycol packet 17 g Daily    promethazine (PHENERGAN) 12.5 mg in dextrose 5 % 50 mL IVPB Q6H PRN    sodium chloride 0.9% flush 10 mL PRN    vasopressin (PITRESSIN) 0.2 Units/mL in dextrose 5 % 100 mL infusion Continuous    vitamin renal formula (B-complex-vitamin c-folic acid) 1 mg per capsule 1 capsule Daily       Objective:     Vital Signs (Most Recent):  Temp: 98.78 °F (37.1 °C) (07/22/22 1100)  Pulse: 74 (07/22/22 1100)  Resp: (!) 32 (07/22/22 1250)  BP: (!) 108/54 (07/22/22 1100)  SpO2: 100 % (07/22/22 1100)  O2 Device (Oxygen Therapy): nasal cannula (07/22/22 0941)   Vital Signs (24h Range):  Temp:  [98.78 °F (37.1 °C)-100.76 °F (38.2 °C)] 98.78 °F (37.1 °C)  Pulse:  [74-97] 74  Resp:  [0-32] 32  SpO2:  [99 %-100 %] 100 %  BP: ()/(51-67) 108/54  Arterial Line BP: ()/(35-49) 114/47     Weight: 67.6 kg (149 lb 0.5 oz) (07/22/22 0550)  Body mass index is 24.8 kg/m².  Body surface area is 1.76 meters squared.    I/O last 3 completed shifts:  In: 43381.8 [I.V.:7152.4; Blood:3098.6; NG/GT:300; IV Piggyback:299.9]  Out: 8993 [Urine:312; Drains:170; Other:6777; Chest Tube:1734]    Physical Exam  Vitals and nursing note reviewed.   Constitutional:       Appearance: Normal appearance.   Cardiovascular:      Rate and Rhythm: Normal rate and regular rhythm.   Pulmonary:      Effort: Pulmonary effort is normal.      Breath sounds: Normal breath sounds.   Abdominal:      General: Abdomen is flat.      Tenderness: There is no abdominal tenderness.   Musculoskeletal:      Right lower leg: No edema.      Left lower leg: No edema.   Neurological:      Mental Status: She is alert.       Significant Labs: reviewed  BMP  Lab Results   Component Value Date     07/22/2022     07/22/2022    K 5.0 07/22/2022    K 5.0 07/22/2022     07/22/2022    CL  106 07/22/2022    CO2 21 (L) 07/22/2022    CO2 22 (L) 07/22/2022    BUN 11 07/22/2022    BUN 11 07/22/2022    CREATININE 1.8 (H) 07/22/2022    CREATININE 1.9 (H) 07/22/2022    CALCIUM 8.3 (L) 07/22/2022    CALCIUM 8.3 (L) 07/22/2022    ANIONGAP 10 07/22/2022    ANIONGAP 9 07/22/2022    ESTGFRAFRICA 31 (A) 07/22/2022    ESTGFRAFRICA 29 (A) 07/22/2022    EGFRNONAA 27 (A) 07/22/2022    EGFRNONAA 25 (A) 07/22/2022     Lab Results   Component Value Date    WBC 11.08 07/22/2022    HGB 8.9 (L) 07/22/2022    HCT 25.2 (L) 07/22/2022    MCV 85 07/22/2022     (L) 07/22/2022       ABG  Recent Labs   Lab 07/22/22  0421   PH 7.476*   PO2 79*   PCO2 31.0*   HCO3 22.9*   BE -1       Significant Imaging: reviewed    Assessment/Plan:     76 y/o female with ESRD on chronic HD presented with acute coronary syndrome:     ESRD (end stage renal disease)  ESRD pt on chronic HD q MWF at [facility]  On CRRT since CABG on 7/20/22. Lined clotted this afternoon  Good response to CRRT  Stable and balanced fluid state  Will not re-start CRRT and will change back to conventional HD    K normal  Na normal  Metabolic acidosis stable  Pt was visited multiple times in ICU     HTN: BP stable  Meds reviewed     Anemia: normocytic  On epogen      * Acute coronary events  NSTEMI  Troponin very high  S/p CABG on 7/20/22  Reviewed the chart  Discussed with CT surgery      Type 2 diabetes mellitus with hyperglycemia, with long-term current use of insulin  Reviewed the chart.        Plans and recommendations:  As discussed above  Total critical care time spent 40 minutes including time needed to review the records, the   patient evaluation, documentation, discussion with ICU and other providers,   more than 50% of the time was spent on coordination of care and counseling.   Multiple medical issues were addressed, as documented. Medical care provided was in addition to providing dialysis. Pt received multiple visits and evaluations.        Amin  MD Valentin  Nephrology  [facility] - Intensive [facility] ([facility])

## 2022-07-22 NOTE — PROGRESS NOTES
Subjective:      Patient ID: Avril Monzon is a 77 y.o. female.    Chief Complaint: Chest Pain (Seen at urgent care yesterday; c/o sharp pain with shortness of breath; hx of MI three years ago)    HPI:  77-year-old female who was admitted with the NSTEMI had i left heart catheterization which revealed 3 vessel coronary artery disease.  Patient has previous PCI to her proximal LAD and she has the diabetic hypertension hyperlipidemia end-stage renal disease on dialysis.  She also had pulmonary hypertension with EF of 40% global hypokinesis he moderate mitral regurgitation preop.  She was chronically anemic and history of smoking in the past she has diabetic retinopathy.  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  The patient had multiple dialysis she was also dated for the Plavix washout before she was taken up for surgery.  The patient had CABG x2 on a beating heart pump assisted coronary artery bypass grafting in the postoperative.  The patient had pulmonary hypertension with right heart failure for which she was assisted with nitric oxide inhalation she came to the ICU and was started on her planned CRRT.  The patient remained coagulopathic and hypo thermic overnight for which she was treated with FFP cryo and also packed cells she is on epinephrine Primacor and intermittent vasopressin she is awake and follows simple commands and this morning her nitric oxide is being weaned off.  7/22/22 the patient nitric oxide has been weaned off she remained hemodynamically stable had AP is downto0 4and a Primacor is 0.2 she is awake following commands    Review of patient's allergies indicates:   Allergen Reactions    Codeine Swelling    Darvon [propoxyphene] Swelling    Atorvastatin      Muscle twitching       Past Medical History:   Diagnosis Date    Acute hypoxemic respiratory failure  2018    Anemia     Arthritis     back, hand, knees    Back pain     Cataract     CKD stage G4/A3, GFR 15 - 29 and albumin creatinine ratio >300 mg/g     GFR 40% 2014 and 33% ub 3/2014 ([facility])    Coronary artery disease involving native coronary artery of native heart 2018    Diabetic retinopathy     DM (diabetes mellitus) type II controlled, neurological manifestation     Exudative age-related macular degeneration of left eye with active choroidal neovascularization 2019    GERD (gastroesophageal reflux disease)     Glaucoma     Heart failure     Hyperlipidemia     Hypertension     Non-ST elevation MI (NSTEMI) 2018    NSTEMI (non-ST elevated myocardial infarction) 2018    Peripheral neuropathy     Polyneuropathy     Proteinuria     >6 mo     Seizures     [facility] 2014 -dick CT NRI showed small vessel    Stroke     Tobacco dependence     Type 2 diabetes with peripheral circulatory disorder, controlled        Family History   Problem Relation Age of Onset    Diabetes Mother     Hyperlipidemia Mother     Hypertension Mother     Heart disease Mother         MI    Muscular dystrophy Son     Cancer Maternal Grandmother         colon       Social History     Socioeconomic History    Marital status:    Tobacco Use    Smoking status: Former Smoker     Packs/day: 1.50     Years: 30.00     Pack years: 45.00     Types: Cigarettes     Quit date: 1990     Years since quittin.5    Smokeless tobacco: Former User   Substance and Sexual Activity    Alcohol use: No     Alcohol/week: 0.0 standard drinks    Drug use: No    Sexual activity: Not Currently       Past Surgical History:   Procedure Laterality Date    BREAST LUMPECTOMY Left     benign, when patient was 15 years old    BREAST MASS EXCISION      ,benign, age 15.    CATHETERIZATION OF BOTH LEFT AND RIGHT HEART N/A 2018    Procedure: CATHETERIZATION, HEART, BOTH LEFT AND RIGHT;   "Surgeon: Michelle Holman MD;  Location: [facility] CATH LAB;  Service: Cardiology;  Laterality: N/A;    CATHETERIZATION OF BOTH LEFT AND RIGHT HEART N/A 11/30/2018    Procedure: CATHETERIZATION, HEART, BOTH LEFT AND RIGHT;  Surgeon: Michelle Holman MD;  Location: [facility] CATH LAB;  Service: Cardiology;  Laterality: N/A;    CORONARY ARTERY BYPASS GRAFT (CABG) N/A 7/20/2022    Procedure: CORONARY ARTERY BYPASS GRAFT (CABG);  Surgeon: Sanju Locke MD;  Location: [facility];  Service: Cardiothoracic;  Laterality: N/A;  2-VESSEL PUMP ASSIST WITH EPI-AORTIC ULTRASOUND    ENDOSCOPIC HARVEST OF VEIN Left 7/20/2022    Procedure: SURGICAL PROCUREMENT, VEIN, ENDOSCOPIC;  Surgeon: Sanju Locke MD;  Location: [facility];  Service: Cardiothoracic;  Laterality: Left;    HYSTERECTOMY  1982    INJECTION OF ANESTHETIC AGENT AROUND MULTIPLE INTERCOSTAL NERVES N/A 7/20/2022    Procedure: BLOCK, NERVE, INTERCOSTAL, 2 OR MORE;  Surgeon: Sanju Locke MD;  Location: [facility];  Service: Cardiothoracic;  Laterality: N/A;  PARASTERNAL NERVE BLOCK    INSERTION OF SHUNT      LEFT HEART CATHETERIZATION Left 12/3/2018    Procedure: CATHETERIZATION, HEART, LEFT;  Surgeon: Michelle Holman MD;  Location: [facility] CATH LAB;  Service: Cardiology;  Laterality: Left;  LAD ATHERECTOMY STENT/ FEMORAL APPROACH    LEFT HEART CATHETERIZATION Left 7/13/2022    Procedure: CATHETERIZATION, HEART, LEFT;  Surgeon: Abhi Sloan MD;  Location: [facility] CATH LAB;  Service: Cardiology;  Laterality: Left;    OOPHORECTOMY      wrist cyst Right 1980s    dorsal wrist cyst       Review of Systems   Unable to perform ROS: Acuity of condition          Objective:   BP (!) 114/56   Pulse 80   Temp 99.14 °F (37.3 °C)   Resp (!) 23   Ht 5' 5" (1.651 m)   Wt 67.6 kg (149 lb 0.5 oz)   LMP  (LMP Unknown)   SpO2 100%   Breastfeeding No   BMI 24.80 kg/m²     X-Ray Chest AP Portable  Narrative: EXAMINATION:  XR CHEST AP PORTABLE    CLINICAL HISTORY:  Post-op; " Atherosclerotic heart disease of native coronary artery without angina pectoris    TECHNIQUE:  Single frontal view of the chest was performed.    COMPARISON:  07/21/2022    FINDINGS:  Tubes and lines are stable.   In comparison to the prior study, there is no adverse interval changes.  Impression: In comparison to the prior study, there is no adverse interval changes    Electronically signed by: Sebastien Trent MD  Date:    07/22/2022  Time:    06:55         Physical Exam  Vitals and nursing note reviewed.   Constitutional:       Appearance: She is ill-appearing.      Comments: Patient is sedated intubated   HENT:      Head: Normocephalic.      Mouth/Throat:      Mouth: Mucous membranes are moist.   Eyes:      Extraocular Movements: Extraocular movements intact.      Pupils: Pupils are equal, round, and reactive to light.   Cardiovascular:      Rate and Rhythm: Regular rhythm. Tachycardia present.   Pulmonary:      Effort: Pulmonary effort is normal.      Breath sounds: Normal breath sounds.   Abdominal:      Palpations: Abdomen is soft.   Musculoskeletal:      Cervical back: Normal range of motion and neck supple.      Right lower leg: Edema present.      Left lower leg: Edema present.   Skin:     General: Skin is warm.      Capillary Refill: Capillary refill takes less than 2 seconds.   Neurological:      General: No focal deficit present.      Mental Status: She is alert. Mental status is at baseline.     Chest x-ray shows postsurgical changes with small left-sided effusion and bibasilar atelectasis    Assessment:     1. Coronary artery disease involving native heart, unspecified vessel or lesion type, unspecified whether angina present    2. Chest pain    3. NSTEMI (non-ST elevated myocardial infarction)    4. Diabetic ketoacidosis without coma associated with other specified diabetes mellitus    5. Hyperkalemia    6. ESRD (end stage renal disease)    7. Elevated troponin I level    8. Congestive heart failure,  unspecified HF chronicity, unspecified heart failure type    9. Elevated troponin    10. Post-operative state    11. S/P CABG (coronary artery bypass graft)    12. CAD (coronary artery disease)    13. DKA (diabetic ketoacidosis)    14. Hypertension associated with diabetes    15. Metabolic acidosis    16. On mechanically assisted ventilation          Plan   CABG x2 postop day 2 severe pulmonary hypertension end-stage renal disease on dialysis  Wean the ventilator as tolerated and extubate  Aggressive pulmonary toilet  Wean the epinephrine as tolerated  Postoperative anemia acute blood loss transfusion to keep hematocrit about 24  Coagulopathy resolved  Diabetes mellitus on insulin drip  DVT and GI prophylaxis Pepcid and SCDs  Nutrition renal diet after extubation  Critically ill  End-stage renal disease on CRRT today will converted to hemodialysis once she is extubated  Will DC the [place] catheter after few hours of extubation    I spent 35 minutes rounding on the patient with multi disciplinary team and patient care          Sanju Locke MD  [facility]  [place]

## 2022-07-22 NOTE — PROGRESS NOTES
[facility] ([facility])  Adult Nutrition  Progress Note    SUMMARY       Recommendations    Recommendation/Intervention:   1. To advance oral diet as appropriate.    2. To recommended a cardiac, renal diet    Goals: Patient to tolerate cardiac, renal oral diet.    Nutrition Goal Status: progressing     Communication of RD Recs: other (comment) (plan of care; sticky notes)      Assessment and Plan    Nutrition Problem  Inadequate po intake     Related to (etiology):   Npo status     Signs and Symptoms (as evidenced by):   inadequate oral intake     Interventions/Recommendation (treatment strategy):  1.  To monitor length of NPO status  2.  To recommended Cardiac, Renal diet as oral intake is appropriate    Nutrition Diagnosis Status:   Progressing and monitoring       Malnutrition Assessment                                       Reason for Assessment    Reason For Assessment: RD follow-up    Diagnosis: other (see comments) (s/p surgical procedure)    Relevant Medical History: Nstemi, ESRD on HD, Pulmonary edema, Diabetes    Interdisciplinary Rounds: did not attend    General Information Comments:   7/20/2022:Patient pending surgical procedure at this time.  Patient with NPO status at this time.  Oral supplements previous ordered:  Novosource BID.  Last bowel movement documented on 7/14/2022.   Will continue to monitor npo status and tolerance issues.  (RD remote)  7/22/2022: Patient continues with an NPO status.  Oral supplements previously ordered.  Lab reviewed.  Last bowel movement noted on 7/14/2022.  Will continue to monitor npo status and encourage oral intake when medically feasible.  (RD Remote)    Nutrition Discharge Planning: Patient to continue and tolerate cardiac/renal diet.    Nutrition Risk Screen    Nutrition Risk Screen: NPO x 3 days     Nutrition/Diet History    Spiritual, Cultural Beliefs, Uatsdin Practices, Values that Affect Care: no  Food Allergies: NKFA  Factors Affecting Nutritional  "Intake: NPO    Anthropometrics    Temp: 98.78 °F (37.1 °C)  Height Method: Stated  Height: 5' 5" (165.1 cm)  Height (inches): 65 in  Weight Method: Bed Scale  Weight: 67.6 kg (149 lb 0.5 oz)  Weight (lb): 149.03 lb  Ideal Body Weight (IBW), Female: 125 lb  % Ideal Body Weight, Female (lb): 108.8 %  BMI (Calculated): 24.8  BMI Grade: 18.5-24.9 - normal       Lab/Procedures/Meds  BMP  Lab Results   Component Value Date     07/22/2022     07/22/2022    K 5.0 07/22/2022    K 5.0 07/22/2022     07/22/2022     07/22/2022    CO2 21 (L) 07/22/2022    CO2 22 (L) 07/22/2022    BUN 11 07/22/2022    BUN 11 07/22/2022    CREATININE 1.8 (H) 07/22/2022    CREATININE 1.9 (H) 07/22/2022    CALCIUM 8.3 (L) 07/22/2022    CALCIUM 8.3 (L) 07/22/2022    ANIONGAP 10 07/22/2022    ANIONGAP 9 07/22/2022    ESTGFRAFRICA 31 (A) 07/22/2022    ESTGFRAFRICA 29 (A) 07/22/2022    EGFRNONAA 27 (A) 07/22/2022    EGFRNONAA 25 (A) 07/22/2022     Lab Results   Component Value Date     07/22/2022     07/22/2022    K 5.0 07/22/2022    K 5.0 07/22/2022     07/22/2022     07/22/2022    CO2 21 (L) 07/22/2022    CO2 22 (L) 07/22/2022     Lab Results   Component Value Date    CALCIUM 8.3 (L) 07/22/2022    CALCIUM 8.3 (L) 07/22/2022    PHOS 2.9 07/22/2022    PHOS 3.0 07/22/2022     Lab Results   Component Value Date    LABPROT 13.5 (H) 07/21/2022    ALBUMIN 3.1 (L) 07/22/2022     Lab Results   Component Value Date    HGBA1C 9.3 (H) 07/12/2022     Lab Results   Component Value Date    CREATININE 1.8 (H) 07/22/2022    CREATININE 1.9 (H) 07/22/2022    BUN 11 07/22/2022    BUN 11 07/22/2022     07/22/2022     07/22/2022    K 5.0 07/22/2022    K 5.0 07/22/2022     07/22/2022     07/22/2022    CO2 21 (L) 07/22/2022    CO2 22 (L) 07/22/2022     Scheduled Meds:   ascorbic acid (vitamin C)  500 mg Per OG tube Daily    aspirin  81 mg Per OG tube Daily    calcium gluconate IVPB  1 g Intravenous " Once    chlorhexidine  10 mL Mouth/Throat BID    clopidogreL  75 mg Per OG tube Daily    cyanocobalamin  1,000 mcg Per OG tube Daily    famotidine  20 mg Per OG tube Daily    folic acid  1 mg Per OG tube Daily    insulin detemir U-100  5 Units Subcutaneous BID    magnesium hydroxide 400 mg/5 ml  30 mL Per OG tube Daily    metoprolol tartrate  25 mg Per OG tube BID    mupirocin   Nasal BID    polyethylene glycol  17 g Per NG tube Daily    vitamin renal formula (B-complex-vitamin c-folic acid)  1 capsule Per OG tube Daily     Continuous Infusions:   sodium chloride 0.9% 200 mL/hr at 07/22/22 1105    dexmedetomidine (PRECEDEX) infusion 0.4 mcg/kg/hr (07/22/22 1105)    EPINEPHrine 0.02 mcg/kg/min (07/22/22 1105)    milrinone 20mg/100ml D5W (200mcg/ml) 0.2 mcg/kg/min (07/22/22 1105)    niCARdipine Stopped (07/20/22 1515)    nitric oxide gas Stopped (07/21/22 1457)    vasopressin Stopped (07/21/22 0858)     PRN Meds:.acetaminophen, albumin human 5%, dextrose 10%, fentaNYL, glucagon (human recombinant), heparin (porcine), insulin aspart U-100, lactated ringers, magnesium sulfate IVPB, ondansetron, promethazine (PHENERGAN) IVPB, sodium chloride 0.9%    Pertinent Labs Reviewed: reviewed  Pertinent Medications Reviewed: reviewed    Physical Findings/Assessment         Estimated/Assessed Needs    Weight Used For Calorie Calculations: 61.9 kg (136 lb 7.4 oz)  Energy Calorie Requirements (kcal): 30-35 kcals/kg  Energy Need Method: Kcal/kg (ESRD)  Protein Requirements: 1.2-2.0g/kg  Weight Used For Protein Calculations: 61.9 kg (136 lb 7.4 oz)  Fluid Requirements (mL): 1000+output  Estimated Fluid Requirement Method: other (see comments) (ESRD on HD)  RDA Method (mL): 30  CHO Requirement: 232-270 per day      Nutrition Prescription Ordered    Current Diet Order: NPO  Oral Nutrition Supplement: Novasource BID    Evaluation of Received Nutrient/Fluid Intake    % Kcal Needs:  (npo)  % Protein Needs:  (npo)  Energy  Calories Required: other (see comments) (npo)  Protein Required: other (see comments) (npo)  Fluid Required: other (see comments) (npo)  Tolerance: other (see comments) (npo)  % Intake of Estimated Energy Needs: Other: NPO status   % Meal Intake: NPO    Nutrition Risk    Level of Risk/Frequency of Follow-up: moderate - high     Monitor and Evaluation    Food and Nutrient Intake: energy intake, food and beverage intake  Food and Nutrient Adminstration: diet order  Knowledge/Beliefs/Attitudes: food and nutrition knowledge/skill, beliefs and attitudes  Physical Activity and Function: nutrition-related ADLs and IADLs, factors affecting access to physical activity  Anthropometric Measurements: height/length, weight, weight change, body mass index  Biochemical Data, Medical Tests and Procedures: electrolyte and renal panel, lipid profile, gastrointestinal profile, glucose/endocrine profile, inflammatory profile     Nutrition Follow-Up    RD Follow-up?: Yes     Mariah Sauer, MS, RDN, LDN

## 2022-07-22 NOTE — PROGRESS NOTES
0645: Assumed care of patient. Checked gtts, lines, and ETT. Review orders and chart    0941 Pt extubated, 2 RTs and nurse present. Pt placed on 4LNC at this time     1045: Dr. Hanson verbal order with new UF goal of 150 from 100. Will titrate accordingly as patient tolerates. Verbal order to switch 4k bags to 2k bags d/t patient K+ result.    1315: CRRT clotted and blood returned. Contacted Dr. Hanson and Dr. Locke, at this time will d/c CRRT and evaluate for traditional in the future.

## 2022-07-22 NOTE — PLAN OF CARE
Interventions/Recommendation (treatment strategy):  1.  To monitor length of NPO status  2.  To recommended Cardiac, Renal diet as oral intake is appropriate      Mariah Sauer MS, RDN, LDN  (Rd remote)

## 2022-07-22 NOTE — PROGRESS NOTES
Extubated at approximately 0945 this morning. Tolerating nasal cannula- 99% at 3lpm, no respiratory distress noted. Hemodynamics maintaining MAP >65 with only milrinone infusion. Will continue to monitor.

## 2022-07-22 NOTE — PT/OT/SLP EVAL
Occupational Therapy   Evaluation    Name: Avril Monzon  MRN: [id number]  Admitting Diagnosis:  S/P CABG (coronary artery bypass graft)  Recent Surgery: Procedure(s) (LRB):  CORONARY ARTERY BYPASS GRAFT (CABG) (N/A)  ECHOCARDIOGRAM,TRANSESOPHAGEAL (N/A)  BLOCK, NERVE, INTERCOSTAL, 2 OR MORE (N/A)  SURGICAL PROCUREMENT, VEIN, ENDOSCOPIC (Left)  INSERTION, VASCULAR ACCESS CATHETER (N/A) 2 Days Post-Op    Recommendations:     Discharge Recommendations: nursing facility, skilled  Discharge Equipment Recommendations:     Barriers to discharge:       Assessment:     Avril Monzon is a 77 y.o. female with a medical diagnosis of S/P CABG (coronary artery bypass graft).  She presents with DEBILITY AND GENERALIZED WEAKNESS. Performance deficits affecting function: weakness, impaired self care skills, impaired endurance, impaired functional mobility, gait instability, decreased lower extremity function, impaired balance, decreased safety awareness, decreased upper extremity function.      Rehab Prognosis: Fair; patient would benefit from acute skilled OT services to address these deficits and reach maximum level of function.       Plan:     Patient to be seen 2 x/week to address the above listed problems via therapeutic exercises, self-care/home management, therapeutic activities  · Plan of Care Expires: 08/05/22  · Plan of Care Reviewed with: patient, daughter    Subjective     Chief Complaint: DEBILITY AND GENERALIZED WEAKNESS  Patient/Family Comments/goals:     Occupational Profile:  Living Environment: LIVES WITH DAUGHTER AND SPOUSE   Previous level of function: (I) WITH ADL'S AND FUNCTIONAL MOBILITY  Roles and Routines: OCCUPATIONAL THERAPY  Equipment Used at Home:    Assistance upon Discharge:     Pain/Comfort:  Pain Rating 1: 0/10    Patients cultural, spiritual, Catholic conflicts given the current situation:      Objective:     Communicated with: NURSE AND Epic CHART REVIEW prior to session.  Patient found HOB  elevated with peripheral IV, wound vac, blood pressure cuff, arterial line, pulse ox (continuous) upon OT entry to room.    General Precautions: Standard, fall   Orthopedic Precautions:N/A   Braces:      Occupational Performance:    Bed Mobility:    · Patient completed Rolling/Turning to Left with  total assistance and 2 persons  · Patient completed Rolling/Turning to Right with total assistance and 2 persons    Activities of Daily Living:  · Lower Body Dressing: total assistance .  · Toileting: total assistance .     Cognitive/Visual Perceptual:  Cognitive/Psychosocial Skills:  -       Oriented to: Person and Place   -       Follows Commands/attention:Follows one-step commands  -       Communication: LOW TONE   -       Memory: UNABLE TO ASSESS  -       Safety awareness/insight to disability: impaired     Physical Exam:  Upper Extremity Range of Motion:  -       Right Upper Extremity: PROM WFL OF STERNAL PRECAUTIONS  -       Left Upper Extremity: PROM WFL OF STERNAL PRECAUTIONS    AMPAC 6 Click ADL:  AMPAC Total Score: 12    Treatment & Education:Education:    Patient educated on role of OT in acute setting and benefits of participation. Educated on techniques to use to increase independence and decrease fall risk with functional transfers. Educated on importance of OOB activity and calling for A to transfer back to bed. DAUGTHER/ PT EDUCATED  of B UE GENTLE PROM THERAPEUTIC EXERCISE  WFL OF STERNAL PRECAUTIONS. Patient stated understanding and in agreement with POC.     HOB elevated with all lines intact, bed alarm on, NURSE notified and NURSE AND DAUGHTER present    GOALS:   Multidisciplinary Problems     Occupational Therapy Goals        Problem: Occupational Therapy    Goal Priority Disciplines Outcome Interventions   Occupational Therapy Goal     OT, PT/OT     Description: O.T. GOALS TO BE MET BY 8-5-22  PT WILL REQ MIN A WITH SUPINE<SIT TRANSFERS  PT WILL TOLERATE 1 SET X  5 GENTLE PROM EXERCISE WITHIN STERNAL  PRECAUTIONS  MIN A WITH BSC TRANSFERS  PT WILL BE ABLE TO VERBALIZED STERNAL PRECAUTIONS AND RETURNED DEMONSTRATION DURING THERAPEUTIC ACTIVITY                   History:     Past Medical History:   Diagnosis Date    Acute hypoxemic respiratory failure 11/26/2018    Anemia     Arthritis     back, hand, knees    Back pain     Cataract     CKD stage G4/A3, GFR 15 - 29 and albumin creatinine ratio >300 mg/g     GFR 40% Jan 2014 and 33% ub 3/2014 ([facility])    Coronary artery disease involving native coronary artery of native heart 11/30/2018    Diabetic retinopathy     DM (diabetes mellitus) type II controlled, neurological manifestation     Exudative age-related macular degeneration of left eye with active choroidal neovascularization 2/5/2019    GERD (gastroesophageal reflux disease)     Glaucoma     Heart failure     Hyperlipidemia     Hypertension     Non-ST elevation MI (NSTEMI) 11/28/2018    NSTEMI (non-ST elevated myocardial infarction) 11/27/2018    Peripheral neuropathy     Polyneuropathy     Proteinuria     >6 mo     Seizures     [facility] 1/2014 -dick CT NRI showed small vessel    Stroke 2012,2014    Tobacco dependence     Type 2 diabetes with peripheral circulatory disorder, controlled          Past Surgical History:   Procedure Laterality Date    BREAST LUMPECTOMY Left     benign, when patient was 15 years old    BREAST MASS EXCISION      ,benign, age 15.    CATHETERIZATION OF BOTH LEFT AND RIGHT HEART N/A 11/28/2018    Procedure: CATHETERIZATION, HEART, BOTH LEFT AND RIGHT;  Surgeon: Michelle Holman MD;  Location: [facility] CATH LAB;  Service: Cardiology;  Laterality: N/A;    CATHETERIZATION OF BOTH LEFT AND RIGHT HEART N/A 11/30/2018    Procedure: CATHETERIZATION, HEART, BOTH LEFT AND RIGHT;  Surgeon: Michelle Holman MD;  Location: [facility] CATH LAB;  Service: Cardiology;  Laterality: N/A;    CORONARY ARTERY BYPASS GRAFT (CABG) N/A 7/20/2022    Procedure: CORONARY ARTERY BYPASS GRAFT (CABG);   Surgeon: Sanju Locke MD;  Location: [facility] OR;  Service: Cardiothoracic;  Laterality: N/A;  2-VESSEL PUMP ASSIST WITH EPI-AORTIC ULTRASOUND    ENDOSCOPIC HARVEST OF VEIN Left 7/20/2022    Procedure: SURGICAL PROCUREMENT, VEIN, ENDOSCOPIC;  Surgeon: Sanju Locke MD;  Location: [facility] OR;  Service: Cardiothoracic;  Laterality: Left;    HYSTERECTOMY  1982    INJECTION OF ANESTHETIC AGENT AROUND MULTIPLE INTERCOSTAL NERVES N/A 7/20/2022    Procedure: BLOCK, NERVE, INTERCOSTAL, 2 OR MORE;  Surgeon: Sanju Locke MD;  Location: [facility] OR;  Service: Cardiothoracic;  Laterality: N/A;  PARASTERNAL NERVE BLOCK    INSERTION OF SHUNT      LEFT HEART CATHETERIZATION Left 12/3/2018    Procedure: CATHETERIZATION, HEART, LEFT;  Surgeon: Michelle Holman MD;  Location: [facility] CATH LAB;  Service: Cardiology;  Laterality: Left;  LAD ATHERECTOMY STENT/ FEMORAL APPROACH    LEFT HEART CATHETERIZATION Left 7/13/2022    Procedure: CATHETERIZATION, HEART, LEFT;  Surgeon: Abhi Sloan MD;  Location: [facility] CATH LAB;  Service: Cardiology;  Laterality: Left;    OOPHORECTOMY      wrist cyst Right 1980s    dorsal wrist cyst       Time Tracking:     OT Date of Treatment: 07/22/22  OT Start Time: 1000  OT Stop Time: 1025  OT Total Time (min): 25 min    Billable Minutes:Evaluation 15 MINUTES  Therapeutic Activity   10 MINUTES    7/22/2022

## 2022-07-22 NOTE — ASSESSMENT & PLAN NOTE
- per cards/CTS mgmt  - requiring pressor support for hemodynamic, suggest holding BB until more normotensive or SBP >90 and MAP >65 without pressors

## 2022-07-22 NOTE — ASSESSMENT & PLAN NOTE
- per CTS mgmt, will assist   - Intubated 7/20  - BP tenuous on two vasopressors, will attempt to wean as clinically tolerating  - VAP prophylaxis  - daily SAT/SBT   - vent settings reviewed for optimal ventilation

## 2022-07-22 NOTE — SUBJECTIVE & OBJECTIVE
Interval History: Pt was seen and examined in ICU. Labs and meds reviewed. Discussed with other providers. Has tolerated CRRT well. No new issues. Noted lined clotted this afternoon.    Review of patient's allergies indicates:   Allergen Reactions    Codeine Swelling    Darvon [propoxyphene] Swelling    Atorvastatin      Muscle twitching     Current Facility-Administered Medications   Medication Frequency    0.9%  NaCl infusion (CRRT USE ONLY) Continuous    acetaminophen oral solution 650 mg Q6H PRN    albumin human 5% bottle 25 g PRN    ascorbic acid (vitamin C) tablet 500 mg Daily    aspirin chewable tablet 81 mg Daily    calcium gluconate 1 g in NS IVPB (premixed) Once    chlorhexidine 0.12 % solution 10 mL BID    clopidogreL tablet 75 mg Daily    cyanocobalamin tablet 1,000 mcg Daily    dexmedetomidine (PRECEDEX) 400mcg/100mL dextrose 5% infusion Continuous    dextrose 10% bolus 125 mL PRN    EPINEPHrine (ADRENALIN) 5 mg in dextrose 5 % 250 mL infusion Continuous    famotidine tablet 20 mg Daily    fentaNYL 50 mcg/mL injection 50 mcg Q1H PRN    folic acid tablet 1 mg Daily    glucagon (human recombinant) injection 1 mg PRN    heparin (porcine) injection 4,000 Units PRN    insulin aspart U-100 pen 1-10 Units Q6H PRN    insulin detemir U-100 pen 5 Units BID    lactated ringers infusion PRN    magnesium hydroxide 400 mg/5 ml suspension 2,400 mg Daily    magnesium sulfate 2g in water 50mL IVPB (premix) PRN    metoprolol tartrate (LOPRESSOR) tablet 25 mg BID    milrinone 20mg in D5W 100 mL infusion Continuous    mupirocin 2 % ointment BID    niCARdipine 40 mg/200 mL (0.2 mg/mL) infusion Continuous    nitric oxide gas Gas 40 ppm Continuous    ondansetron injection 4 mg Q12H PRN    polyethylene glycol packet 17 g Daily    promethazine (PHENERGAN) 12.5 mg in dextrose 5 % 50 mL IVPB Q6H PRN    sodium chloride 0.9% flush 10 mL PRN    vasopressin (PITRESSIN) 0.2 Units/mL in dextrose 5 % 100 mL infusion Continuous     vitamin renal formula (B-complex-vitamin c-folic acid) 1 mg per capsule 1 capsule Daily       Objective:     Vital Signs (Most Recent):  Temp: 98.78 °F (37.1 °C) (07/22/22 1100)  Pulse: 74 (07/22/22 1100)  Resp: (!) 32 (07/22/22 1250)  BP: (!) 108/54 (07/22/22 1100)  SpO2: 100 % (07/22/22 1100)  O2 Device (Oxygen Therapy): nasal cannula (07/22/22 0941)   Vital Signs (24h Range):  Temp:  [98.78 °F (37.1 °C)-100.76 °F (38.2 °C)] 98.78 °F (37.1 °C)  Pulse:  [74-97] 74  Resp:  [0-32] 32  SpO2:  [99 %-100 %] 100 %  BP: ()/(51-67) 108/54  Arterial Line BP: ()/(35-49) 114/47     Weight: 67.6 kg (149 lb 0.5 oz) (07/22/22 0550)  Body mass index is 24.8 kg/m².  Body surface area is 1.76 meters squared.    I/O last 3 completed shifts:  In: 89666.8 [I.V.:7152.4; Blood:3098.6; NG/GT:300; IV Piggyback:299.9]  Out: 8993 [Urine:312; Drains:170; Other:6777; Chest Tube:1734]    Physical Exam  Vitals and nursing note reviewed.   Constitutional:       Appearance: Normal appearance.   Cardiovascular:      Rate and Rhythm: Normal rate and regular rhythm.   Pulmonary:      Effort: Pulmonary effort is normal.      Breath sounds: Normal breath sounds.   Abdominal:      General: Abdomen is flat.      Tenderness: There is no abdominal tenderness.   Musculoskeletal:      Right lower leg: No edema.      Left lower leg: No edema.   Neurological:      Mental Status: She is alert.       Significant Labs: reviewed  BMP  Lab Results   Component Value Date     07/22/2022     07/22/2022    K 5.0 07/22/2022    K 5.0 07/22/2022     07/22/2022     07/22/2022    CO2 21 (L) 07/22/2022    CO2 22 (L) 07/22/2022    BUN 11 07/22/2022    BUN 11 07/22/2022    CREATININE 1.8 (H) 07/22/2022    CREATININE 1.9 (H) 07/22/2022    CALCIUM 8.3 (L) 07/22/2022    CALCIUM 8.3 (L) 07/22/2022    ANIONGAP 10 07/22/2022    ANIONGAP 9 07/22/2022    ESTGFRAFRICA 31 (A) 07/22/2022    ESTGFRAFRICA 29 (A) 07/22/2022    EGFRNONAA 27 (A) 07/22/2022     EGFRNONAA 25 (A) 07/22/2022     Lab Results   Component Value Date    WBC 11.08 07/22/2022    HGB 8.9 (L) 07/22/2022    HCT 25.2 (L) 07/22/2022    MCV 85 07/22/2022     (L) 07/22/2022       ABG  Recent Labs   Lab 07/22/22  0421   PH 7.476*   PO2 79*   PCO2 31.0*   HCO3 22.9*   BE -1       Significant Imaging: reviewed

## 2022-07-23 LAB
ALBUMIN SERPL BCP-MCNC: 3.1 G/DL (ref 3.5–5.2)
ALBUMIN SERPL BCP-MCNC: 3.8 G/DL (ref 3.5–5.2)
ANION GAP SERPL CALC-SCNC: 11 MMOL/L (ref 8–16)
ANION GAP SERPL CALC-SCNC: 11 MMOL/L (ref 8–16)
ANION GAP SERPL CALC-SCNC: 13 MMOL/L (ref 8–16)
ANION GAP SERPL CALC-SCNC: 15 MMOL/L (ref 8–16)
BASOPHILS # BLD AUTO: 0.01 K/UL (ref 0–0.2)
BASOPHILS NFR BLD: 0.1 % (ref 0–1.9)
BUN SERPL-MCNC: 25 MG/DL (ref 8–23)
BUN SERPL-MCNC: 25 MG/DL (ref 8–23)
BUN SERPL-MCNC: 8 MG/DL (ref 8–23)
BUN SERPL-MCNC: 8 MG/DL (ref 8–23)
CALCIUM SERPL-MCNC: 8.9 MG/DL (ref 8.7–10.5)
CALCIUM SERPL-MCNC: 8.9 MG/DL (ref 8.7–10.5)
CALCIUM SERPL-MCNC: 9.1 MG/DL (ref 8.7–10.5)
CALCIUM SERPL-MCNC: 9.1 MG/DL (ref 8.7–10.5)
CHLORIDE SERPL-SCNC: 105 MMOL/L (ref 95–110)
CHLORIDE SERPL-SCNC: 105 MMOL/L (ref 95–110)
CHLORIDE SERPL-SCNC: 97 MMOL/L (ref 95–110)
CHLORIDE SERPL-SCNC: 98 MMOL/L (ref 95–110)
CO2 SERPL-SCNC: 20 MMOL/L (ref 23–29)
CO2 SERPL-SCNC: 20 MMOL/L (ref 23–29)
CO2 SERPL-SCNC: 25 MMOL/L (ref 23–29)
CO2 SERPL-SCNC: 26 MMOL/L (ref 23–29)
CREAT SERPL-MCNC: 1.1 MG/DL (ref 0.5–1.4)
CREAT SERPL-MCNC: 1.1 MG/DL (ref 0.5–1.4)
CREAT SERPL-MCNC: 3.2 MG/DL (ref 0.5–1.4)
CREAT SERPL-MCNC: 3.2 MG/DL (ref 0.5–1.4)
DIFFERENTIAL METHOD: ABNORMAL
EOSINOPHIL # BLD AUTO: 0 K/UL (ref 0–0.5)
EOSINOPHIL NFR BLD: 0.1 % (ref 0–8)
ERYTHROCYTE [DISTWIDTH] IN BLOOD BY AUTOMATED COUNT: 16.7 % (ref 11.5–14.5)
EST. GFR  (AFRICAN AMERICAN): 16 ML/MIN/1.73 M^2
EST. GFR  (AFRICAN AMERICAN): 16 ML/MIN/1.73 M^2
EST. GFR  (AFRICAN AMERICAN): 57 ML/MIN/1.73 M^2
EST. GFR  (AFRICAN AMERICAN): 57 ML/MIN/1.73 M^2
EST. GFR  (NON AFRICAN AMERICAN): 14 ML/MIN/1.73 M^2
EST. GFR  (NON AFRICAN AMERICAN): 14 ML/MIN/1.73 M^2
EST. GFR  (NON AFRICAN AMERICAN): 49 ML/MIN/1.73 M^2
EST. GFR  (NON AFRICAN AMERICAN): 49 ML/MIN/1.73 M^2
GLUCOSE SERPL-MCNC: 221 MG/DL (ref 70–110)
GLUCOSE SERPL-MCNC: 221 MG/DL (ref 70–110)
GLUCOSE SERPL-MCNC: 83 MG/DL (ref 70–110)
GLUCOSE SERPL-MCNC: 84 MG/DL (ref 70–110)
HCT VFR BLD AUTO: 27.7 % (ref 37–48.5)
HGB BLD-MCNC: 9.4 G/DL (ref 12–16)
IMM GRANULOCYTES # BLD AUTO: 0.06 K/UL (ref 0–0.04)
IMM GRANULOCYTES NFR BLD AUTO: 0.4 % (ref 0–0.5)
LYMPHOCYTES # BLD AUTO: 0.5 K/UL (ref 1–4.8)
LYMPHOCYTES NFR BLD: 3.8 % (ref 18–48)
MAGNESIUM SERPL-MCNC: 2.2 MG/DL (ref 1.6–2.6)
MAGNESIUM SERPL-MCNC: 2.2 MG/DL (ref 1.6–2.6)
MAGNESIUM SERPL-MCNC: 2.8 MG/DL (ref 1.6–2.6)
MCH RBC QN AUTO: 30.4 PG (ref 27–31)
MCHC RBC AUTO-ENTMCNC: 33.9 G/DL (ref 32–36)
MCV RBC AUTO: 90 FL (ref 82–98)
MONOCYTES # BLD AUTO: 0.7 K/UL (ref 0.3–1)
MONOCYTES NFR BLD: 5.4 % (ref 4–15)
NEUTROPHILS # BLD AUTO: 12.1 K/UL (ref 1.8–7.7)
NEUTROPHILS NFR BLD: 90.2 % (ref 38–73)
NRBC BLD-RTO: 0 /100 WBC
PHOSPHATE SERPL-MCNC: 1.3 MG/DL (ref 2.7–4.5)
PHOSPHATE SERPL-MCNC: 3.7 MG/DL (ref 2.7–4.5)
PHOSPHATE SERPL-MCNC: 3.7 MG/DL (ref 2.7–4.5)
PLATELET # BLD AUTO: 87 K/UL (ref 150–450)
PMV BLD AUTO: 9.3 FL (ref 9.2–12.9)
POCT GLUCOSE: 101 MG/DL (ref 70–110)
POCT GLUCOSE: 103 MG/DL (ref 70–110)
POCT GLUCOSE: 121 MG/DL (ref 70–110)
POCT GLUCOSE: 162 MG/DL (ref 70–110)
POCT GLUCOSE: 222 MG/DL (ref 70–110)
POTASSIUM SERPL-SCNC: 3 MMOL/L (ref 3.5–5.1)
POTASSIUM SERPL-SCNC: 3 MMOL/L (ref 3.5–5.1)
POTASSIUM SERPL-SCNC: 5 MMOL/L (ref 3.5–5.1)
POTASSIUM SERPL-SCNC: 5 MMOL/L (ref 3.5–5.1)
RBC # BLD AUTO: 3.09 M/UL (ref 4–5.4)
SODIUM SERPL-SCNC: 136 MMOL/L (ref 136–145)
SODIUM SERPL-SCNC: 136 MMOL/L (ref 136–145)
SODIUM SERPL-SCNC: 137 MMOL/L (ref 136–145)
SODIUM SERPL-SCNC: 137 MMOL/L (ref 136–145)
WBC # BLD AUTO: 13.41 K/UL (ref 3.9–12.7)

## 2022-07-23 PROCEDURE — 99900035 HC TECH TIME PER 15 MIN (STAT)

## 2022-07-23 PROCEDURE — 83735 ASSAY OF MAGNESIUM: CPT | Mod: 91 | Performed by: THORACIC SURGERY (CARDIOTHORACIC VASCULAR SURGERY)

## 2022-07-23 PROCEDURE — 90937 HEMODIALYSIS REPEATED EVAL: CPT | Mod: ,,, | Performed by: INTERNAL MEDICINE

## 2022-07-23 PROCEDURE — 99291 CRITICAL CARE FIRST HOUR: CPT | Mod: ,,, | Performed by: INTERNAL MEDICINE

## 2022-07-23 PROCEDURE — 90937 PR HEMODIALYSIS, REPEATED EVAL.: ICD-10-PCS | Mod: ,,, | Performed by: INTERNAL MEDICINE

## 2022-07-23 PROCEDURE — 20000000 HC ICU ROOM

## 2022-07-23 PROCEDURE — 99233 PR SUBSEQUENT HOSPITAL CARE,LEVL III: ICD-10-PCS | Mod: ,,, | Performed by: INTERNAL MEDICINE

## 2022-07-23 PROCEDURE — 80069 RENAL FUNCTION PANEL: CPT | Mod: 91 | Performed by: INTERNAL MEDICINE

## 2022-07-23 PROCEDURE — 25000003 PHARM REV CODE 250: Performed by: ANESTHESIOLOGY

## 2022-07-23 PROCEDURE — 80048 BASIC METABOLIC PNL TOTAL CA: CPT | Performed by: THORACIC SURGERY (CARDIOTHORACIC VASCULAR SURGERY)

## 2022-07-23 PROCEDURE — 99233 SBSQ HOSP IP/OBS HIGH 50: CPT | Mod: ,,, | Performed by: INTERNAL MEDICINE

## 2022-07-23 PROCEDURE — 25000003 PHARM REV CODE 250: Performed by: THORACIC SURGERY (CARDIOTHORACIC VASCULAR SURGERY)

## 2022-07-23 PROCEDURE — 80069 RENAL FUNCTION PANEL: CPT | Performed by: THORACIC SURGERY (CARDIOTHORACIC VASCULAR SURGERY)

## 2022-07-23 PROCEDURE — 99291 CRITICAL CARE FIRST HOUR: CPT | Mod: 25,,, | Performed by: INTERNAL MEDICINE

## 2022-07-23 PROCEDURE — 94761 N-INVAS EAR/PLS OXIMETRY MLT: CPT

## 2022-07-23 PROCEDURE — 97530 THERAPEUTIC ACTIVITIES: CPT

## 2022-07-23 PROCEDURE — 99291 PR CRITICAL CARE, E/M 30-74 MINUTES: ICD-10-PCS | Mod: ,,, | Performed by: INTERNAL MEDICINE

## 2022-07-23 PROCEDURE — 25000003 PHARM REV CODE 250

## 2022-07-23 PROCEDURE — 99291 PR CRITICAL CARE, E/M 30-74 MINUTES: ICD-10-PCS | Mod: 25,,, | Performed by: INTERNAL MEDICINE

## 2022-07-23 PROCEDURE — 83735 ASSAY OF MAGNESIUM: CPT | Mod: 91 | Performed by: INTERNAL MEDICINE

## 2022-07-23 PROCEDURE — 80100014 HC HEMODIALYSIS 1:1

## 2022-07-23 PROCEDURE — 85025 COMPLETE CBC W/AUTO DIFF WBC: CPT | Performed by: THORACIC SURGERY (CARDIOTHORACIC VASCULAR SURGERY)

## 2022-07-23 RX ORDER — TRAMADOL HYDROCHLORIDE 50 MG/1
50 TABLET ORAL 3 TIMES DAILY PRN
Status: DISCONTINUED | OUTPATIENT
Start: 2022-07-23 | End: 2022-07-27 | Stop reason: HOSPADM

## 2022-07-23 RX ORDER — POLYETHYLENE GLYCOL 3350 17 G/17G
17 POWDER, FOR SOLUTION ORAL DAILY PRN
Status: DISCONTINUED | OUTPATIENT
Start: 2022-07-23 | End: 2022-07-27 | Stop reason: HOSPADM

## 2022-07-23 RX ORDER — TALC
6 POWDER (GRAM) TOPICAL NIGHTLY PRN
Status: DISCONTINUED | OUTPATIENT
Start: 2022-07-23 | End: 2022-07-27 | Stop reason: HOSPADM

## 2022-07-23 RX ORDER — HYDRALAZINE HYDROCHLORIDE 20 MG/ML
10 INJECTION INTRAMUSCULAR; INTRAVENOUS EVERY 6 HOURS PRN
Status: DISCONTINUED | OUTPATIENT
Start: 2022-07-23 | End: 2022-07-27 | Stop reason: HOSPADM

## 2022-07-23 RX ADMIN — CYANOCOBALAMIN TAB 1000 MCG 1000 MCG: 1000 TAB at 09:07

## 2022-07-23 RX ADMIN — FOLIC ACID 1 MG: 1 TABLET ORAL at 08:07

## 2022-07-23 RX ADMIN — OXYCODONE HYDROCHLORIDE AND ACETAMINOPHEN 500 MG: 500 TABLET ORAL at 08:07

## 2022-07-23 RX ADMIN — INSULIN DETEMIR 5 UNITS: 100 INJECTION, SOLUTION SUBCUTANEOUS at 08:07

## 2022-07-23 RX ADMIN — ASPIRIN 81 MG CHEWABLE TABLET 81 MG: 81 TABLET CHEWABLE at 08:07

## 2022-07-23 RX ADMIN — INSULIN ASPART 4 UNITS: 100 INJECTION, SOLUTION INTRAVENOUS; SUBCUTANEOUS at 11:07

## 2022-07-23 RX ADMIN — FAMOTIDINE 20 MG: 20 TABLET ORAL at 08:07

## 2022-07-23 RX ADMIN — NEPHROCAP 1 CAPSULE: 1 CAP ORAL at 08:07

## 2022-07-23 RX ADMIN — Medication 6 MG: at 10:07

## 2022-07-23 RX ADMIN — METOPROLOL TARTRATE 25 MG: 25 TABLET, FILM COATED ORAL at 08:07

## 2022-07-23 RX ADMIN — MAGNESIUM HYDROXIDE 2400 MG: 400 SUSPENSION ORAL at 06:07

## 2022-07-23 RX ADMIN — CHLORHEXIDINE GLUCONATE 0.12% ORAL RINSE 10 ML: 1.2 LIQUID ORAL at 08:07

## 2022-07-23 RX ADMIN — MUPIROCIN: 20 OINTMENT TOPICAL at 08:07

## 2022-07-23 RX ADMIN — POLYETHYLENE GLYCOL 3350 17 G: 17 POWDER, FOR SOLUTION ORAL at 08:07

## 2022-07-23 RX ADMIN — MUPIROCIN: 20 OINTMENT TOPICAL at 09:07

## 2022-07-23 NOTE — PROGRESS NOTES
[facility] ([facility])  Nephrology  Progress Note    Patient Name: Avril Monzon  MRN: [id number]  Admission Date: 7/12/2022  Hospital Length of Stay: 11 days  Attending Provider: Sanju Locke MD   Primary Care Physician: Rose Parks MD  Principal Problem:S/P CABG (coronary artery bypass graft)    Subjective:     HPI: Noted    Interval History: Pt was seen and examined in ICU. Labs and meds reviewed. Discussed with other providers. No new issues or events. CRRT was stopped yesterday after adequate response had been reached and pt was converted back to conventional HD. No c/o's, feels comfortable. Pt received several visits throughout the day.    Review of patient's allergies indicates:   Allergen Reactions    Codeine Swelling    Darvon [propoxyphene] Swelling    Atorvastatin      Muscle twitching     Current Facility-Administered Medications   Medication Frequency    acetaminophen oral solution 650 mg Q6H PRN    albumin human 5% bottle 25 g PRN    ascorbic acid (vitamin C) tablet 500 mg Daily    aspirin chewable tablet 81 mg Daily    calcium gluconate 1 g in NS IVPB (premixed) Once    chlorhexidine 0.12 % solution 10 mL BID    cyanocobalamin tablet 1,000 mcg Daily    dextrose 10% bolus 125 mL PRN    famotidine tablet 20 mg Daily    fentaNYL 50 mcg/mL injection 50 mcg Q1H PRN    folic acid tablet 1 mg Daily    glucagon (human recombinant) injection 1 mg PRN    heparin (porcine) injection 4,000 Units PRN    hydrALAZINE injection 10 mg Q6H PRN    insulin aspart U-100 pen 1-10 Units Q6H PRN    insulin detemir U-100 pen 5 Units BID    lactated ringers infusion PRN    magnesium hydroxide 400 mg/5 ml suspension 2,400 mg Daily    metoprolol tartrate (LOPRESSOR) tablet 25 mg BID    mupirocin 2 % ointment BID    ondansetron injection 4 mg Q12H PRN    polyethylene glycol packet 17 g Daily PRN    promethazine (PHENERGAN) 12.5 mg in dextrose 5 % 50 mL IVPB Q6H PRN    sodium  chloride 0.9% flush 10 mL PRN    traMADoL tablet 50 mg TID PRN    vitamin renal formula (B-complex-vitamin c-folic acid) 1 mg per capsule 1 capsule Daily       Objective:     Vital Signs (Most Recent):  Temp: 97.7 °F (36.5 °C) (07/23/22 1115)  Pulse: 78 (07/23/22 1200)  Resp: (!) 27 (07/23/22 1200)  BP: (!) 165/72 (07/23/22 1200)  SpO2: 100 % (07/23/22 1200)  O2 Device (Oxygen Therapy): room air (07/23/22 0748)   Vital Signs (24h Range):  Temp:  [97.7 °F (36.5 °C)-99.5 °F (37.5 °C)] 97.7 °F (36.5 °C)  Pulse:  [] 78  Resp:  [13-36] 27  SpO2:  [92 %-100 %] 100 %  BP: (107-170)/(55-74) 165/72  Arterial Line BP: (103-133)/(41-52) 119/41     Weight: 64 kg (141 lb 1.5 oz) (07/23/22 0559)  Body mass index is 23.48 kg/m².  Body surface area is 1.71 meters squared.    I/O last 3 completed shifts:  In: 4896.2 [P.O.:440; I.V.:4178.3; NG/GT:185; IV Piggyback:92.9]  Out: 6822 [Urine:65; Drains:22; Other:5655; Chest Tube:1080]    Physical Exam  Vitals and nursing note reviewed.   Constitutional:       Appearance: Normal appearance.   Cardiovascular:      Rate and Rhythm: Normal rate and regular rhythm.      Pulses: Normal pulses.      Heart sounds: Normal heart sounds.     No friction rub.   Pulmonary:      Effort: Pulmonary effort is normal. No respiratory distress.      Breath sounds: Normal breath sounds. No rales.   Abdominal:      General: Abdomen is flat.      Tenderness: There is no abdominal tenderness.   Musculoskeletal:      Right lower leg: No edema.      Left lower leg: No edema.   Neurological:      Mental Status: She is alert and oriented to person, place, and time.   Psychiatric:         Behavior: Behavior normal.       Significant Labs: reviewed  BMP  Lab Results   Component Value Date     07/23/2022     07/23/2022    K 5.0 07/23/2022    K 5.0 07/23/2022     07/23/2022     07/23/2022    CO2 20 (L) 07/23/2022    CO2 20 (L) 07/23/2022    BUN 25 (H) 07/23/2022    BUN 25 (H) 07/23/2022     CREATININE 3.2 (H) 07/23/2022    CREATININE 3.2 (H) 07/23/2022    CALCIUM 8.9 07/23/2022    CALCIUM 8.9 07/23/2022    ANIONGAP 11 07/23/2022    ANIONGAP 11 07/23/2022    ESTGFRAFRICA 16 (A) 07/23/2022    ESTGFRAFRICA 16 (A) 07/23/2022    EGFRNONAA 14 (A) 07/23/2022    EGFRNONAA 14 (A) 07/23/2022     Lab Results   Component Value Date    WBC 13.41 (H) 07/23/2022    HGB 9.4 (L) 07/23/2022    HCT 27.7 (L) 07/23/2022    MCV 90 07/23/2022    PLT 87 (L) 07/23/2022         Significant Imaging: reviewed CXR, mild pulm edema    Assessment/Plan:     76 y/o female with ESRD on chronic HD presented with acute coronary syndrome:     ESRD (end stage renal disease)  ESRD pt on chronic HD q MWF at [facility]  On CRRT since CABG on 7/20/22 until 7/23/22  Good response to CRRT  Switched back to conventional HD  On HD     K normal  Na normal  Metabolic acidosis stable   HTN: BP stable, slightly elevated, will monitor. Will slowly re-start prior meds.  Meds reviewed  Anemia: normocytic. On epogen      * Acute coronary events  NSTEMI  S/p CABG on 7/20/22  Hemodynamically stable, doing well clinically  Mild fluid overload, being dialyzed today  Discussed with CT surgery      Type 2 diabetes mellitus with hyperglycemia, with long-term current use of insulin  Reviewed the chart.        Plans and recommendations:  As discussed above  Total critical care time spent 40 minutes including time needed to review the records, the   patient evaluation, documentation, discussion with ICU and other providers,   more than 50% of the time was spent on coordination of care and counseling.   Multiple medical issues were addressed, as documented. Medical care provided was in addition to providing dialysis. Pt received multiple visits and evaluations.      Angela Hanson MD  Nephrology  [facility] ([facility])

## 2022-07-23 NOTE — ASSESSMENT & PLAN NOTE
- per nephrology mgmt  - CRRT remains on at  goal net of zero  - monitor BMP  7/23 off CRRT.  Conventional HD as per Nephrology.

## 2022-07-23 NOTE — PLAN OF CARE
Pt is AAO , NSR on monitor, Tolerating room air - 1 NC. VSS, Removed vas cath, THIERRY drain in left leg, and pacer wires this morning, pt tolerated without complications. HD in progress this afternoon. BM x1 this morning. OOB to chair tolerated approx 5 hours today. Plan to possible removed introducer tomorrow and step down to tele floor.

## 2022-07-23 NOTE — SUBJECTIVE & OBJECTIVE
Interval History: Pt was seen and examined in ICU. Labs and meds reviewed. Discussed with other providers. No new issues or events. CRRT was stopped yesterday after adequate response had been reached and pt was converted back to conventional HD. No c/o's, feels comfortable.    Review of patient's allergies indicates:   Allergen Reactions    Codeine Swelling    Darvon [propoxyphene] Swelling    Atorvastatin      Muscle twitching     Current Facility-Administered Medications   Medication Frequency    acetaminophen oral solution 650 mg Q6H PRN    albumin human 5% bottle 25 g PRN    ascorbic acid (vitamin C) tablet 500 mg Daily    aspirin chewable tablet 81 mg Daily    calcium gluconate 1 g in NS IVPB (premixed) Once    chlorhexidine 0.12 % solution 10 mL BID    cyanocobalamin tablet 1,000 mcg Daily    dextrose 10% bolus 125 mL PRN    famotidine tablet 20 mg Daily    fentaNYL 50 mcg/mL injection 50 mcg Q1H PRN    folic acid tablet 1 mg Daily    glucagon (human recombinant) injection 1 mg PRN    heparin (porcine) injection 4,000 Units PRN    hydrALAZINE injection 10 mg Q6H PRN    insulin aspart U-100 pen 1-10 Units Q6H PRN    insulin detemir U-100 pen 5 Units BID    lactated ringers infusion PRN    magnesium hydroxide 400 mg/5 ml suspension 2,400 mg Daily    metoprolol tartrate (LOPRESSOR) tablet 25 mg BID    mupirocin 2 % ointment BID    ondansetron injection 4 mg Q12H PRN    polyethylene glycol packet 17 g Daily PRN    promethazine (PHENERGAN) 12.5 mg in dextrose 5 % 50 mL IVPB Q6H PRN    sodium chloride 0.9% flush 10 mL PRN    traMADoL tablet 50 mg TID PRN    vitamin renal formula (B-complex-vitamin c-folic acid) 1 mg per capsule 1 capsule Daily       Objective:     Vital Signs (Most Recent):  Temp: 97.7 °F (36.5 °C) (07/23/22 1115)  Pulse: 78 (07/23/22 1200)  Resp: (!) 27 (07/23/22 1200)  BP: (!) 165/72 (07/23/22 1200)  SpO2: 100 % (07/23/22 1200)  O2 Device (Oxygen Therapy): room air (07/23/22 0748)   Vital Signs  (24h Range):  Temp:  [97.7 °F (36.5 °C)-99.5 °F (37.5 °C)] 97.7 °F (36.5 °C)  Pulse:  [] 78  Resp:  [13-36] 27  SpO2:  [92 %-100 %] 100 %  BP: (107-170)/(55-74) 165/72  Arterial Line BP: (103-133)/(41-52) 119/41     Weight: 64 kg (141 lb 1.5 oz) (07/23/22 0559)  Body mass index is 23.48 kg/m².  Body surface area is 1.71 meters squared.    I/O last 3 completed shifts:  In: 4896.2 [P.O.:440; I.V.:4178.3; NG/GT:185; IV Piggyback:92.9]  Out: 6822 [Urine:65; Drains:22; Other:5655; Chest Tube:1080]    Physical Exam  Vitals and nursing note reviewed.   Constitutional:       Appearance: Normal appearance.   Cardiovascular:      Rate and Rhythm: Normal rate and regular rhythm.      Pulses: Normal pulses.      Heart sounds: Normal heart sounds.     No friction rub.   Pulmonary:      Effort: Pulmonary effort is normal. No respiratory distress.      Breath sounds: Normal breath sounds. No rales.   Abdominal:      General: Abdomen is flat.      Tenderness: There is no abdominal tenderness.   Musculoskeletal:      Right lower leg: No edema.      Left lower leg: No edema.   Neurological:      Mental Status: She is alert and oriented to person, place, and time.   Psychiatric:         Behavior: Behavior normal.       Significant Labs: reviewed  BMP  Lab Results   Component Value Date     07/23/2022     07/23/2022    K 5.0 07/23/2022    K 5.0 07/23/2022     07/23/2022     07/23/2022    CO2 20 (L) 07/23/2022    CO2 20 (L) 07/23/2022    BUN 25 (H) 07/23/2022    BUN 25 (H) 07/23/2022    CREATININE 3.2 (H) 07/23/2022    CREATININE 3.2 (H) 07/23/2022    CALCIUM 8.9 07/23/2022    CALCIUM 8.9 07/23/2022    ANIONGAP 11 07/23/2022    ANIONGAP 11 07/23/2022    ESTGFRAFRICA 16 (A) 07/23/2022    ESTGFRAFRICA 16 (A) 07/23/2022    EGFRNONAA 14 (A) 07/23/2022    EGFRNONAA 14 (A) 07/23/2022     Lab Results   Component Value Date    WBC 13.41 (H) 07/23/2022    HGB 9.4 (L) 07/23/2022    HCT 27.7 (L) 07/23/2022    MCV 90  07/23/2022    PLT 87 (L) 07/23/2022         Significant Imaging: reviewed CXR, mild pulm edema

## 2022-07-23 NOTE — PT/OT/SLP PROGRESS
Occupational Therapy  Treatment    Avril Monzon   MRN: [id number]   Admitting Diagnosis: S/P CABG (coronary artery bypass graft)    OT Date of Treatment: 07/23/22   OT Start Time: 1258  OT Stop Time: 1313  OT Total Time (min): 15 min    Billable Minutes:  Therapeutic Activity 15    OT/CHAVA: OT          General Precautions: Standard, fall, sternal  Orthopedic Precautions:    Braces:    Respiratory Status: O2 NC         Subjective:  Communicated with NURSE prior to session.       Pain/Comfort  Pain Rating 1: 4/10  Location 1: chest  Pain Addressed 1: Reposition, Distraction, Cessation of Activity    Objective:  Patient found with: blood pressure cuff, chest tube, peripheral IV, telemetry, pulse ox (continuous), oxygen, central line     Functional Mobility:  Bed Mobility:       Transfers:        Functional Ambulation: NT DUE TO LIMITS OF MULT ICU LINES.    Activities of Daily Living:     Feeding adaptive equipment: N/A     UE adaptive equipment: NT     LE adaptive equipment: TBD                    Bathing adaptive equipment: TBD    Balance:   Static Sit: POOR+: Needs MINIMAL assist to maintain  Dynamic Sit: FAIR: Cannot move trunk without losing balance  Static Stand: POOR: Needs MODERATE assist to maintain  Dynamic stand: POOR: Needs MOD (moderate) assist during gait    Therapeutic Activities and Exercises:  PATIENT PRACTICED BED MOBILITY AND FUNC T/F'S WITH CUING FOR CARRYOVER WITH STERNAL PRECAUTIONS.  PATIENT C/O INABILITY TO DON/DOFF SOCKS.    AM-PAC 6 CLICK ADL   How much help from another person does this patient currently need?   1 = Unable, Total/Dependent Assistance  2 = A lot, Maximum/Moderate Assistance  3 = A little, Minimum/Contact Guard/Supervision  4 = None, Modified [place]/Independent    Putting on and taking off regular lower body clothing? : 2  Bathing (including washing, rinsing, drying)?: 2  Toileting, which includes using toilet, bedpan, or urinal? : 2  Putting on and taking off regular upper  "body clothing?: 2  Taking care of personal grooming such as brushing teeth?: 2  Eating meals?: 2  Daily Activity Total Score: 12     AM-PAC Raw Score CMS "G-Code Modifier Level of Impairment Assistance   6 % Total / Unable   7 - 8 CM 80 - 100% Maximal Assist   9-13 CL 60 - 80% Moderate Assist   14 - 19 CK 40 - 60% Moderate Assist   20 - 22 CJ 20 - 40% Minimal Assist   23 CI 1-20% SBA / CGA   24 CH 0% Independent/ Mod I       Patient left up in chair with all lines intact, call button in reach and NURSE notified    ASSESSMENT:  Avril Monzon is a 77 y.o. female with a medical diagnosis of S/P CABG (coronary artery bypass graft) and presents with SELF CARE DEBILITY.    Rehab identified problem list/impairments: Rehab identified problem list/impairments: weakness, impaired endurance, impaired self care skills, impaired functional mobility, gait instability, impaired balance, visual deficits, decreased upper extremity function, decreased lower extremity function, decreased safety awareness, pain, decreased ROM    Rehab potential is good.    Activity tolerance: Fair    Discharge recommendations: Discharge Facility/Level of Care Needs: nursing facility, skilled     Barriers to discharge: Barriers to Discharge: None    Equipment recommendations:  (TBD)     GOALS:   Multidisciplinary Problems     Occupational Therapy Goals        Problem: Occupational Therapy    Goal Priority Disciplines Outcome Interventions   Occupational Therapy Goal     OT, PT/OT     Description: O.T. GOALS TO BE MET BY 8-5-22  PT WILL REQ MIN A WITH SUPINE<SIT TRANSFERS  PT WILL TOLERATE 1 SET X  5 GENTLE PROM EXERCISE WITHIN STERNAL PRECAUTIONS  MIN A WITH BSC TRANSFERS  PT WILL BE ABLE TO VERBALIZED STERNAL PRECAUTIONS AND RETURNED DEMONSTRATION DURING THERAPEUTIC ACTIVITY                   Plan:  Patient to be seen 2 x/week to address the above listed problems via self-care/home management, therapeutic activities, therapeutic exercises  Plan " of Care expires: 08/05/22  Plan of Care reviewed with: patient         07/23/2022

## 2022-07-23 NOTE — NURSING
Spoke with lab about patient's missing lab results from 0430 this morning. She called back explaining she did not have the blood sample in the lab, nor does it say received on their end. New STAT lab orders placed and brought to lab.

## 2022-07-23 NOTE — ASSESSMENT & PLAN NOTE
Plan for CABG on Monday per CTS  Continue ASA BB  Chest pain free    7/18/2022 chest pain free awaiting cabg    7/21/22  -See plan under CABG    7/23/2022   S/P LIMA TO LAD AND SVG TO OM  RCA OCCLUDED NON REVASCULARIZABLE DUE TO POOR TARGET.

## 2022-07-23 NOTE — ASSESSMENT & PLAN NOTE
-Continue current post-op care and mgmt as per CTS  -H/H dipped to 4.3/12.0, being transfused  -Wean pressors as tolerated  -ASA, Plavix, BB, statin as condition permits    7/22/22  -Recovering post CABG x 2  -H/H stable this AM   -Resume ASA, Plavix as per CTS  -Continue BB, statin  -IS usage and ambulation when able    7/23/2022   PROGRESSING WELL   IS USE ONGOING WILL HOPE TO GET TUBES OUT    CONTINUE  BB ASA STATINS

## 2022-07-23 NOTE — SUBJECTIVE & OBJECTIVE
Interval History: NO ARRYTHMIAS OVERNITE NO CHEST PAIN STILL DRAINS A LOT FROM CHEST TUBE. SHE IS GOING FOR DIALYSIS TODAY.    Review of Systems   Constitutional: Positive for malaise/fatigue. Negative for diaphoresis and weight gain.   HENT:  Negative for hoarse voice.    Eyes:  Negative for blurred vision, double vision and visual disturbance.   Cardiovascular:  Negative for chest pain, claudication, cyanosis, dyspnea on exertion, irregular heartbeat, leg swelling, near-syncope, orthopnea, palpitations, paroxysmal nocturnal dyspnea and syncope.   Respiratory:  Negative for cough, hemoptysis, shortness of breath and snoring.    Hematologic/Lymphatic: Negative for bleeding problem. Bruises/bleeds easily.   Skin:  Negative for color change, dry skin, itching and poor wound healing.   Musculoskeletal:  Negative for falls, muscle cramps, muscle weakness and myalgias.   Gastrointestinal:  Negative for bloating, abdominal pain, change in bowel habit, diarrhea, heartburn, hematemesis, hematochezia, melena and nausea.   Genitourinary:  Negative for flank pain and hematuria.   Neurological:  Positive for weakness. Negative for excessive daytime sleepiness, dizziness, focal weakness, headaches, light-headedness, loss of balance, numbness, paresthesias and seizures.   Psychiatric/Behavioral:  Negative for altered mental status and memory loss. The patient does not have insomnia and is not nervous/anxious.    Allergic/Immunologic: Negative for hives.   Objective:     Vital Signs (Most Recent):  Temp: 98 °F (36.7 °C) (07/23/22 0715)  Pulse: 86 (07/23/22 0748)  Resp: (!) 26 (07/23/22 0748)  BP: (!) 159/71 (07/23/22 0748)  SpO2: 99 % (07/23/22 0748)   Vital Signs (24h Range):  Temp:  [98 °F (36.7 °C)-99.5 °F (37.5 °C)] 98 °F (36.7 °C)  Pulse:  [] 86  Resp:  [13-36] 26  SpO2:  [92 %-100 %] 99 %  BP: (107-170)/(54-74) 159/71  Arterial Line BP: (103-133)/(41-52) 119/41     Weight: 64 kg (141 lb 1.5 oz)  Body mass index is  23.48 kg/m².     SpO2: 99 %  O2 Device (Oxygen Therapy): room air      Intake/Output Summary (Last 24 hours) at 7/23/2022 1045  Last data filed at 7/23/2022 0700  Gross per 24 hour   Intake 1468.19 ml   Output 1884 ml   Net -415.81 ml       Lines/Drains/Airways       Central Venous Catheter Line  Duration                  Hemodialysis Catheter 07/20/22 1348 right femoral 2 days     Introducer with Double Lumen 07/20/22 1400 right internal jugular 2 days              Drain  Duration                  Hemodialysis AV Fistula 04/01/20 2203 Left upper arm 842 days         Closed/Suction Drain 07/20/22 1154 Left Leg Bulb 19 Fr. 2 days         Y Chest Tube 1 and 2 07/20/22 1204 1 Right Mediastinal 24 Fr. 2 Left Mediastinal 24 Fr. 2 days         Y Chest Tube 3 and 4 07/20/22 1205 3 Left Pleural 24 Fr. 4 Right Pleural 24 Fr. 2 days              Line  Duration                  Pacer Wires 07/20/22 1400 2 days              Peripheral Intravenous Line  Duration                  Peripheral IV - Single Lumen 07/18/22 0625 20 G Posterior;Right Wrist 5 days                    Physical Exam  Constitutional:       General: She is not in acute distress.     Appearance: She is well-developed. She is ill-appearing.   HENT:      Head: Normocephalic and atraumatic.   Eyes:      General: No scleral icterus.     Pupils: Pupils are equal, round, and reactive to light.   Neck:      Thyroid: No thyromegaly.      Vascular: Normal carotid pulses. No carotid bruit, hepatojugular reflux or JVD.      Trachea: No tracheal deviation.   Cardiovascular:      Rate and Rhythm: Normal rate and regular rhythm.      Pulses: Normal pulses.      Heart sounds: No murmur heard.    Friction rub present. No gallop.   Pulmonary:      Effort: Pulmonary effort is normal. No respiratory distress.      Breath sounds: Normal breath sounds. No wheezing, rhonchi or rales.      Comments: CHEST INCISION WELL HEALED.   TUBES STILL IN PLACE.  Chest:      Chest wall: No  tenderness.   Abdominal:      General: Bowel sounds are normal. There is no abdominal bruit.      Palpations: Abdomen is soft. There is no hepatomegaly or pulsatile mass.      Tenderness: There is no abdominal tenderness.   Musculoskeletal:      Right shoulder: No deformity.      Cervical back: Normal range of motion and neck supple.      Right lower leg: No edema.      Left lower leg: No edema.      Comments: LEG DRAIN IN PLACE    Skin:     General: Skin is warm and dry.      Findings: No erythema or rash.      Nails: There is no clubbing.   Neurological:      Mental Status: She is alert and oriented to person, place, and time.      Cranial Nerves: No cranial nerve deficit.      Coordination: Coordination normal.   Psychiatric:         Speech: Speech normal.         Behavior: Behavior normal.         Judgment: Judgment normal.       Significant Labs:   Recent Lab Results  (Last 5 results in the past 24 hours)        07/23/22  0607   07/23/22  0031   07/22/22  2353   07/22/22  1715   07/22/22  1709        Anion Gap         7       BUN         13       Calcium         8.6       Chloride         107       CO2         24       Creatinine         2.0       eGFR if          28       eGFR if non          24  Comment: Calculation used to obtain the estimated glomerular filtration  rate (eGFR) is the CKD-EPI equation.          Glucose         102       Magnesium         2.3       POCT Glucose 101   103   61   103         Potassium         4.8       Sodium         138

## 2022-07-23 NOTE — ASSESSMENT & PLAN NOTE
76 y/o female with ESRD on chronic HD presented with acute coronary syndrome:     ESRD (end stage renal disease)  ESRD pt on chronic HD q MWF at [facility]  On CRRT since CABG on 7/20/22. Lined clotted this afternoon  Good response to CRRT  Stable and balanced fluid state  Will not re-start CRRT and will change back to conventional HD     K normal  Na normal  Metabolic acidosis stable  Pt was visited multiple times in ICU     HTN: BP stable  Meds reviewed     Anemia: normocytic  On epogen      * Acute coronary events  NSTEMI  Troponin very high  S/p CABG on 7/20/22  Reviewed the chart  Discussed with CT surgery      Type 2 diabetes mellitus with hyperglycemia, with long-term current use of insulin  Reviewed the chart.        Plans and recommendations:  As discussed above  Total critical care time spent 40 minutes including time needed to review the records, the   patient evaluation, documentation, discussion with ICU and other providers,   more than 50% of the time was spent on coordination of care and counseling.   Multiple medical issues were addressed, as documented. Medical care provided was in addition to providing dialysis. Pt received multiple visits and evaluations.

## 2022-07-23 NOTE — PROGRESS NOTES
[facility] - Intensive Care ([facility])  Critical Care Medicine  Progress Note    Patient Name: Avril Monzon  MRN: [id number]  Admission Date: 7/12/2022  Hospital Length of Stay: 11 days  Code Status: Full Code  Attending Provider: Sanju Locke MD  Primary Care Provider: Rose Praks MD   Principal Problem: S/P CABG (coronary artery bypass graft)    Subjective:     HPI:  77W pmh ESRD, DM, CAD s/p stent, HFpEF (Gr2 DD), HLD, HTN, NSTEMI, seizure, tobacco dependence, and CVA presents with NSTEMI .  Her hospital course has been complicated by DKA.    In ED, EKG with multiple leads with ST depressions.  Initial Troponin approx 12.  She is undergoing HD for volume mgmt.   She is on the DKA protocol with insulin drip, fluids and serial electrolytes.      Hospital/ICU Course:  7/12: Aspirin.  Heparin drip for NSTEMI.  Insulin drip for DKA.  Undergoing HD.  7/20: Day 0: CABG x2. Arrives to ICU intubated. 2 PRBCs received intra-op. Currently on milrinone, epi, & nitric oxide @ 40ppm.   7/21: Day 1: CABG x 2. Remains intubated. Awakens to physical stimuli and follows commands. Increased output from chest tubes, cryo administered overnight. 1/3 PRBCs currently infusing. Rpt labs pending. Nitric @ 18ppm. Remains on Epi, vaso, milronone. Currently on CRRT   7/22: Day 2- s/p CABG x2. Nitric weaned yesterday afernoon. SBT failed- RR >45-50 while on spontaneous, able to tolerate for ~1 hr, will re-attempt today. Labs stable.    7/23 days 3 status post CABG x2.  Extubated yesterday.  On room air.  Had a bowel movement this morning.  Epinephrine Milrinone Precedex discontinued.  Chest tubes output 900 mL.       Interval History: 7/23 days 3 status post CABG x2.  Extubated yesterday.  On room air.  Had a bowel movement this morning.  Epinephrine Milrinone Precedex discontinued.  Chest tubes output 900 mL.     Review of Systems   Constitutional:  Positive for malaise/fatigue. Negative for chills and fever.   HENT:  Negative for  hearing loss and nosebleeds.    Eyes:  Negative for discharge.   Respiratory:  Positive for cough and shortness of breath.    Cardiovascular:  Positive for leg swelling. Negative for chest pain.   Gastrointestinal:  Negative for abdominal pain.   Genitourinary:  Negative for hematuria.   Musculoskeletal:  Negative for falls.   Skin:  Negative for itching.   Neurological:  Positive for weakness. Negative for speech change, seizures and loss of consciousness.   Endo/Heme/Allergies:  Negative for polydipsia. Does not bruise/bleed easily.   Psychiatric/Behavioral:  Negative for hallucinations.        Objective:     Vital Signs (Most Recent):  Temp: 98 °F (36.7 °C) (07/23/22 0715)  Pulse: 86 (07/23/22 0748)  Resp: (!) 26 (07/23/22 0748)  BP: (!) 159/71 (07/23/22 0748)  SpO2: 99 % (07/23/22 0748)   Vital Signs (24h Range):  Temp:  [98 °F (36.7 °C)-99.5 °F (37.5 °C)] 98 °F (36.7 °C)  Pulse:  [] 86  Resp:  [13-36] 26  SpO2:  [92 %-100 %] 99 %  BP: (107-170)/(54-74) 159/71  Arterial Line BP: (103-133)/(41-52) 119/41     Weight: 64 kg (141 lb 1.5 oz)  Body mass index is 23.48 kg/m².      Intake/Output Summary (Last 24 hours) at 7/23/2022 1000  Last data filed at 7/23/2022 0700  Gross per 24 hour   Intake 1468.19 ml   Output 1884 ml   Net -415.81 ml       Physical Exam  Vitals and nursing note reviewed.   Constitutional:       General: She is not in acute distress.     Appearance: She is well-developed. She is ill-appearing.   HENT:      Head: Normocephalic and atraumatic.      Nose: Nose normal.   Eyes:      Extraocular Movements: Extraocular movements intact.   Neck:      Comments: Right IJ introducer  Cardiovascular:      Rate and Rhythm: Normal rate and regular rhythm.   Pulmonary:      Effort: Pulmonary effort is normal.      Breath sounds: No stridor.      Comments: Sternal wound dressing  Abdominal:      General: There is no distension.   Genitourinary:     Comments: Right femoral dialysis  catheter  Musculoskeletal:         General: No signs of injury.      Cervical back: Normal range of motion and neck supple.   Skin:     General: Skin is dry.   Neurological:      General: No focal deficit present.      Mental Status: She is alert and oriented to person, place, and time. Mental status is at baseline.   Psychiatric:         Behavior: Behavior normal.       Vents:  Vent Mode: (S) Spont (07/22/22 0800)  Ventilator Initiated: Yes (07/20/22 1409)  Set Rate: 0 BPM (07/22/22 0800)  Vt Set: 375 mL (07/22/22 0800)  Pressure Support: 10 cmH20 (07/22/22 0800)  PEEP/CPAP: 5 cmH20 (07/22/22 0800)  Oxygen Concentration (%): 30 (07/22/22 0900)  Peak Airway Pressure: 15 cmH2O (07/22/22 0800)  Plateau Pressure: 20 cmH20 (07/22/22 0800)  Total Ve: 8.84 mL (07/22/22 0800)  F/VT Ratio<105 (RSBI): (!) 92.74 (07/22/22 0800)    Lines/Drains/Airways       Central Venous Catheter Line  Duration                  Hemodialysis Catheter 07/20/22 1348 right femoral 2 days     Introducer with Double Lumen 07/20/22 1400 right internal jugular 2 days              Drain  Duration                  Hemodialysis AV Fistula 04/01/20 2203 Left upper arm 842 days         Closed/Suction Drain 07/20/22 1154 Left Leg Bulb 19 Fr. 2 days         Y Chest Tube 1 and 2 07/20/22 1204 1 Right Mediastinal 24 Fr. 2 Left Mediastinal 24 Fr. 2 days         Y Chest Tube 3 and 4 07/20/22 1205 3 Left Pleural 24 Fr. 4 Right Pleural 24 Fr. 2 days              Line  Duration                  Pacer Wires 07/20/22 1400 2 days              Peripheral Intravenous Line  Duration                  Peripheral IV - Single Lumen 07/18/22 0625 20 G Posterior;Right Wrist 5 days                    Significant Labs:    CBC/Anemia Profile:  Recent Labs   Lab 07/21/22  1316 07/22/22  0421   WBC 8.90 11.08   HGB 9.9* 8.9*   HCT 27.4* 25.2*   PLT 65* 109*   MCV 84 85   RDW 14.3 16.1*        Chemistries:  Recent Labs   Lab 07/21/22  2157 07/22/22  0421 07/22/22  0817  07/22/22  1414 07/22/22  1709    137  137  --  139 138   K 4.6 5.0  5.0  --  4.8 4.8    106  106  --  107 107   CO2 19* 22*  21*  --  24 24   BUN 13 11  11  --  13 13   CREATININE 2.0* 1.9*  1.8*  --  1.8* 2.0*   CALCIUM 8.3* 8.3*  8.3*  --  9.0 8.6*   ALBUMIN 3.3* 3.1*  --  3.2*  --    MG 2.1 2.2 2.1 2.3 2.3   PHOS 2.5* 2.9  3.0  --  3.6  --       Latest Reference Range & Units 07/12/22 20:20 07/12/22 22:56 07/13/22 02:00   Troponin I 0.000 - 0.026 ng/mL >50.000 (H) >50.000 (H) >50.000 (H)   (H): Data is abnormally high   Latest Reference Range & Units 07/22/22 04:21   POC PH 7.35 - 7.45  7.476 (H)   POC PCO2 35 - 45 mmHg 31.0 (L)   POC PO2 80 - 100 mmHg 79 (L)   POC HCO3 24 - 28 mmol/L 22.9 (L)   POC SATURATED O2 95 - 100 % 97   POC BE -2 to 2 mmol/L -1   FiO2  30   Vt  375   PEEP  5   Sample  ARTERIAL   DelSys  Adult Vent   Allens Test  N/A   Site  Sofia/UAC   Mode  AC/PRVC   Rate  20     EKG 07/12/2022  Vent. Rate : 082 BPM     Atrial Rate : 082 BPM      P-R Int : 148 ms          QRS Dur : 084 ms       QT Int : 442 ms       P-R-T Axes : 070 030 -65 degrees      QTc Int : 516 ms     Normal sinus rhythm   LVH with repolarization abnormality ( [place] product )   Prolonged QT   When compared with ECG of 12-JUL-2022 10:27,   ST less depressed in Inferior leads   T wave inversion now evident in Anterior leads       2D echo 07/12/2022    · Concentric hypertrophy and normal systolic function.  · The estimated ejection fraction is 55%.  · Normal left ventricular diastolic function.  · Normal right ventricular size with normal right ventricular systolic function.  · Moderate tricuspid regurgitation.  · There is pulmonary hypertension.              Significant Imaging:      Chest x-ray 07/22/2022    CLINICAL HISTORY:  Post-op; Atherosclerotic heart disease of native coronary artery without angina pectoris     TECHNIQUE:  Single frontal view of the chest was performed.     COMPARISON:  07/21/2022      FINDINGS:  Tubes and lines are stable.   In comparison to the prior study, there is no adverse interval changes                  ABG  Recent Labs   Lab 07/22/22  0421   PH 7.476*   PO2 79*   PCO2 31.0*   HCO3 22.9*   BE -1     Assessment/Plan:     Pulmonary  On mechanically assisted ventilation  - per CTS mgmt, will assist   - Intubated 7/20  - BP tenuous on two vasopressors, will attempt to wean as clinically tolerating  - VAP prophylaxis  - daily SAT/SBT   - vent settings reviewed for optimal ventilation  7/23 tolerated extubation on room air today.    Pulmonary hypertension  - hx of PA pressure 69.17 in 2018  - CTS managing  - PA pressure >70mmHg intraop  - nitric weaned yesterday with PA pressures ~45-50  - CRRT at goal net zero    Cardiac/Vascular  * S/P CABG (coronary artery bypass graft)  - per CTS mgmt  - Day 2: s/p CABG to    1. pediciled LIMA to LAD   2. Saphenous vein graft to obtuse marginal  - pressors to maintain MAP >65, wean as clinically tolerated  - CT drainage stable  - Cont to monitor CBC  - ASA, plavix, BB, statin as tolerated   7/23 status post CABG x2.  Aspirin beta-blocker.  Monitor    NSTEMI (non-ST elevated myocardial infarction)  - see s/p CABG assessment & plan  7/23 status post CABG x2.  Aspirin beta-blocker    Elevated troponin  Due to NSTEMI  See assessment and plan for NSTEMI    CAD (coronary artery disease)  - CTS mgmt  - Day 1: s/p CABG   - 7/13 LHC:  · The pre-procedure left ventricular end diastolic pressure was 31.  · The post-procedure left ventricular end diastolic pressure was 32.  · The ejection fraction was calculated to be 40%.  · The Prox RCA to Mid RCA lesion was 100% stenosed.  · The Ost Cx to Prox Cx lesion was 99% stenosed.  · The Prox LAD to Mid LAD lesion was 75% stenosed.  · The estimated blood loss was none.  · There was three vessel coronary artery disease.    Hypertension associated with diabetes  - per cards/CTS mgmt  - requiring pressor support for  hemodynamic, suggest holding BB until more normotensive or SBP >90 and MAP >65 without pressors     Renal/  Metabolic acidosis  - BE improved, HCO3 22.9  - cont to monitor         ESRD (end stage renal disease)  - per nephrology mgmt  - CRRT remains on at  goal net of zero  - monitor BMP  7/23 off CRRT.  Conventional HD as per Nephrology.         Critical Care Daily Checklist:    A: Awake: RASS Goal/Actual Goal: RASS Goal: 0-->alert and calm  Actual: [place] Agitation Sedation Scale (RASS): Alert and calm   B: Spontaneous Breathing Trial Performed? Spon. Breathing Trial Initiated?: Initiated (07/22/22 0800)   C: SAT & SBT Coordinated?  Not intubated extubated yesterday                      D: Delirium: CAM-ICU Overall CAM-ICU: Negative   E: Early Mobility Performed? Yes   F: Feeding Goal: Goals: Patient to tolerate cardiac, renal oral diet.  Status: Nutrition Goal Status: new   Current Diet Order   Procedures    Diet renal     Cardiac + Diabetic restrictions      AS: Analgesia/Sedation Off sedation   T: Thromboembolic Prophylaxis Mechanical prophylaxis   H: HOB > 300 Yes   U: Stress Ulcer Prophylaxis (if needed) Famotidine   G: Glucose Control Fingerstick q.6 hours insulin detemir plus SSI   B: Bowel Function Stool Occurrence: 1   I: Indwelling Catheter (Lines & Arzate) Necessity No arzate   D: De-escalation of Antimicrobials/Pharmacotherapies No antibiotic    Plan for the day/ETD HD    Code Status:  Family/Goals of Care: Full Code       Critical Care Time: 35 minutes  Critical secondary to Patient has a condition that poses threat to life and bodily function:  Myocardial infarction status post CABG x2 end-stage renal disease on dialysis      Critical care was time spent personally by me on the following activities: development of treatment plan with patient or surrogate and bedside caregivers, discussions with consultants, evaluation of patient's response to treatment, examination of patient, ordering and  performing treatments and interventions, ordering and review of laboratory studies, ordering and review of radiographic studies, pulse oximetry, re-evaluation of patient's condition. This critical care time did not overlap with that of any other provider or involve time for any procedures.     Hattie Stephens MD  Critical Care Medicine  [facility] - Intensive Care \[facility]\"")

## 2022-07-23 NOTE — ASSESSMENT & PLAN NOTE
- per CTS mgmt, will assist   - Intubated 7/20  - BP tenuous on two vasopressors, will attempt to wean as clinically tolerating  - VAP prophylaxis  - daily SAT/SBT   - vent settings reviewed for optimal ventilation  7/23 tolerated extubation on room air today.

## 2022-07-23 NOTE — PT/OT/SLP PROGRESS
Physical Therapy Treatment    Patient Name:  Avril Monzon   MRN:  [id number]    Recommendations:     Discharge Recommendations:   (TBD with further assessment) SNF  Discharge Equipment Recommendations:  (TBD with further assessment)   Barriers to discharge: Decreased caregiver support    Assessment:     Ms Monzon is max A with bed mobility and mod a with transfers.  Need continued reinforcement of sternal precautions    Rehab Prognosis: Good; patient would benefit from acute skilled PT services to address these deficits and reach maximum level of function.    Recent Surgery: Procedure(s) (LRB):  CORONARY ARTERY BYPASS GRAFT (CABG) (N/A)  ECHOCARDIOGRAM,TRANSESOPHAGEAL (N/A)  BLOCK, NERVE, INTERCOSTAL, 2 OR MORE (N/A)  SURGICAL PROCUREMENT, VEIN, ENDOSCOPIC (Left)  INSERTION, VASCULAR ACCESS CATHETER (N/A) 3 Days Post-Op    Plan:     During this hospitalization, patient to be seen  (3-5x/week) to address the identified rehab impairments via gait training, therapeutic activities, therapeutic exercises, wheelchair management/training, neuromuscular re-education and progress toward the following goals:    · Plan of Care Expires:  08/05/22    Subjective     Chief Complaint: weakness  Patient/Family Comments/goals: improve mobility  Pain/Comfort:  · Pain Rating 1: 4/10  · Location 1: chest      Objective:     Communicated with Nurse Lui and Danii prior to session.  Patient found supine with   upon PT entry to room.     General Precautions: Standard, sternal   Orthopedic Precautions:N/A   Braces:    Respiratory Status: Nasal cannula, flow 1 L/min     Functional Mobility:  · Bed Mobility:     · Supine to Sit: maximal assistance  · Transfers:     · Bed to Chair: moderate assistance with  no AD  using  Stand Pivot  · Gait: NT      AM-PAC 6 CLICK MOBILITY  Turning over in bed (including adjusting bedclothes, sheets and blankets)?: 2  Sitting down on and standing up from a chair with arms (e.g., wheelchair, bedside commode,  etc.): 2  Moving from lying on back to sitting on the side of the bed?: 2  Moving to and from a bed to a chair (including a wheelchair)?: 2  Need to walk in hospital room?: 2  Climbing 3-5 steps with a railing?: 1  Basic Mobility Total Score: 11       Therapeutic Activities and Exercises:   Supine to sit with max A for upper trunk and VC for sternal precautions.  Transfer bed to chair with MOD A . Performed seated LE exercise- marching, LAQ and heel/toe raise x 20 reps    Patient left up in chair with all lines intact, call button in reach and nursing notified..    GOALS:   Multidisciplinary Problems     Physical Therapy Goals        Problem: Physical Therapy    Goal Priority Disciplines Outcome Goal Variances Interventions   Physical Therapy Goal     PT, PT/OT      Description: LTGs to be met in 14 days time (8/5/22)  Pt will perform bed mobility with SUP in order to participate in EOB activity.  Pt will tolerate sitting EOB x 10 mins with SUP in order to participate in daily tasks.   Pt will tolerate siting OOB x 3-4 hours in order to participate in daily tasks.   Pt will ambulate 100ft SUP in order to safely navigate surroundings.                    Time Tracking:     PT Received On: 07/23/22  PT Start Time: 1230     PT Stop Time: 1300  PT Total Time (min): 30 min     Billable Minutes: Therapeutic Activity 15 and Therapeutic Exercise 15    Treatment Type: Treatment  PT/PTA: PT           07/23/2022

## 2022-07-23 NOTE — PROGRESS NOTES
[facility] ([facility])  Cardiology  Progress Note    Patient Name: Avril Monzon  MRN: [id number]  Admission Date: 7/12/2022  Hospital Length of Stay: 11 days  Code Status: Full Code   Attending Physician: Sanju Locke MD   Primary Care Physician: Rose Parks MD  Expected Discharge Date:   Principal Problem:S/P CABG (coronary artery bypass graft)    Subjective:     Hospital Course:   7/14/2022--Patient seen and examined in room, lying in bed. HD in progress. Denies any cardiac complaints today. Awaiting CABG on Monday.     7/15/2022--patient seen and examined in room, denies any complaints on exam today.     7/17/22 No c/o on HD on exam. Patient denies CP, angina or anginal equivalent. CABG tomorrow. Start statin after cabg, continue b blockers    7/18/2022 stable no chest pain . Cabg on hold due equipment need for off pump bypass    7/21/22-Patient seen and examined today, s/p CABG x 2. Increased OP from chest tubes overnight, H/H dipped to 4.3/12.0, being transfused. On pressor support, undergoing CRRT.     7/22/22-Patient seen and examined today, recovering s/p CABG x 2. Extubated, remains weak, complains of nausea and incisional pain. Pressors being weaned. Labs reviewed. H/H 8.9/25.2. Platelets 109,000.    7/23/2022 UP IN CHAIR TODAY NO PAIN NEEDING HEMODIALYSIS. STILL HAS DRAINAGE FROM CHEST TUBE NON FROM MEDIASTINAL TUBE. SHE IS PAIN FREE ALERT ORIENTED. NO ARRYTHMIAS.       Interval History: NO ARRYTHMIAS OVERNITE NO CHEST PAIN STILL DRAINS A LOT FROM CHEST TUBE. SHE IS GOING FOR DIALYSIS TODAY.    Review of Systems   Constitutional: Positive for malaise/fatigue. Negative for diaphoresis and weight gain.   HENT:  Negative for hoarse voice.    Eyes:  Negative for blurred vision, double vision and visual disturbance.   Cardiovascular:  Negative for chest pain, claudication, cyanosis, dyspnea on exertion, irregular heartbeat, leg swelling, near-syncope, orthopnea, palpitations, paroxysmal  nocturnal dyspnea and syncope.   Respiratory:  Negative for cough, hemoptysis, shortness of breath and snoring.    Hematologic/Lymphatic: Negative for bleeding problem. Bruises/bleeds easily.   Skin:  Negative for color change, dry skin, itching and poor wound healing.   Musculoskeletal:  Negative for falls, muscle cramps, muscle weakness and myalgias.   Gastrointestinal:  Negative for bloating, abdominal pain, change in bowel habit, diarrhea, heartburn, hematemesis, hematochezia, melena and nausea.   Genitourinary:  Negative for flank pain and hematuria.   Neurological:  Positive for weakness. Negative for excessive daytime sleepiness, dizziness, focal weakness, headaches, light-headedness, loss of balance, numbness, paresthesias and seizures.   Psychiatric/Behavioral:  Negative for altered mental status and memory loss. The patient does not have insomnia and is not nervous/anxious.    Allergic/Immunologic: Negative for hives.   Objective:     Vital Signs (Most Recent):  Temp: 98 °F (36.7 °C) (07/23/22 0715)  Pulse: 86 (07/23/22 0748)  Resp: (!) 26 (07/23/22 0748)  BP: (!) 159/71 (07/23/22 0748)  SpO2: 99 % (07/23/22 0748)   Vital Signs (24h Range):  Temp:  [98 °F (36.7 °C)-99.5 °F (37.5 °C)] 98 °F (36.7 °C)  Pulse:  [] 86  Resp:  [13-36] 26  SpO2:  [92 %-100 %] 99 %  BP: (107-170)/(54-74) 159/71  Arterial Line BP: (103-133)/(41-52) 119/41     Weight: 64 kg (141 lb 1.5 oz)  Body mass index is 23.48 kg/m².     SpO2: 99 %  O2 Device (Oxygen Therapy): room air      Intake/Output Summary (Last 24 hours) at 7/23/2022 1045  Last data filed at 7/23/2022 0700  Gross per 24 hour   Intake 1468.19 ml   Output 1884 ml   Net -415.81 ml       Lines/Drains/Airways       Central Venous Catheter Line  Duration                  Hemodialysis Catheter 07/20/22 1348 right femoral 2 days     Introducer with Double Lumen 07/20/22 1400 right internal jugular 2 days              Drain  Duration                  Hemodialysis AV  Fistula 04/01/20 2203 Left upper arm 842 days         Closed/Suction Drain 07/20/22 1154 Left Leg Bulb 19 Fr. 2 days         Y Chest Tube 1 and 2 07/20/22 1204 1 Right Mediastinal 24 Fr. 2 Left Mediastinal 24 Fr. 2 days         Y Chest Tube 3 and 4 07/20/22 1205 3 Left Pleural 24 Fr. 4 Right Pleural 24 Fr. 2 days              Line  Duration                  Pacer Wires 07/20/22 1400 2 days              Peripheral Intravenous Line  Duration                  Peripheral IV - Single Lumen 07/18/22 0625 20 G Posterior;Right Wrist 5 days                    Physical Exam  Constitutional:       General: She is not in acute distress.     Appearance: She is well-developed. She is ill-appearing.   HENT:      Head: Normocephalic and atraumatic.   Eyes:      General: No scleral icterus.     Pupils: Pupils are equal, round, and reactive to light.   Neck:      Thyroid: No thyromegaly.      Vascular: Normal carotid pulses. No carotid bruit, hepatojugular reflux or JVD.      Trachea: No tracheal deviation.   Cardiovascular:      Rate and Rhythm: Normal rate and regular rhythm.      Pulses: Normal pulses.      Heart sounds: No murmur heard.    Friction rub present. No gallop.   Pulmonary:      Effort: Pulmonary effort is normal. No respiratory distress.      Breath sounds: Normal breath sounds. No wheezing, rhonchi or rales.      Comments: CHEST INCISION WELL HEALED.   TUBES STILL IN PLACE.  Chest:      Chest wall: No tenderness.   Abdominal:      General: Bowel sounds are normal. There is no abdominal bruit.      Palpations: Abdomen is soft. There is no hepatomegaly or pulsatile mass.      Tenderness: There is no abdominal tenderness.   Musculoskeletal:      Right shoulder: No deformity.      Cervical back: Normal range of motion and neck supple.      Right lower leg: No edema.      Left lower leg: No edema.      Comments: LEG DRAIN IN PLACE    Skin:     General: Skin is warm and dry.      Findings: No erythema or rash.      Nails:  There is no clubbing.   Neurological:      Mental Status: She is alert and oriented to person, place, and time.      Cranial Nerves: No cranial nerve deficit.      Coordination: Coordination normal.   Psychiatric:         Speech: Speech normal.         Behavior: Behavior normal.         Judgment: Judgment normal.       Significant Labs:   Recent Lab Results  (Last 5 results in the past 24 hours)        07/23/22  0607   07/23/22  0031   07/22/22  2353   07/22/22  1715   07/22/22  1709        Anion Gap         7       BUN         13       Calcium         8.6       Chloride         107       CO2         24       Creatinine         2.0       eGFR if          28       eGFR if non          24  Comment: Calculation used to obtain the estimated glomerular filtration  rate (eGFR) is the CKD-EPI equation.          Glucose         102       Magnesium         2.3       POCT Glucose 101   103   61   103         Potassium         4.8       Sodium         138                                Assessment and Plan:     Brief HPI: A 78 YO FEAMLE WITH ESRD ON HD CALCIFIED AORTA DAIBETS HTN CAD S/P NSTEMI HAD 2 VESSEL CABG SHE IS PROGRESSING WELL SHE IS HAVING EHEMODIALYSIS TODAY. WILL CONTINUE CURRENT REGIMEN AND OPTIMIZE CARDIAC REGIMEN.    * S/P CABG (coronary artery bypass graft)  -Continue current post-op care and mgmt as per CTS  -H/H dipped to 4.3/12.0, being transfused  -Wean pressors as tolerated  -ASA, Plavix, BB, statin as condition permits    7/22/22  -Recovering post CABG x 2  -H/H stable this AM   -Resume ASA, Plavix as per CTS  -Continue BB, statin  -IS usage and ambulation when able    7/23/2022   PROGRESSING WELL   IS USE ONGOING WILL HOPE TO GET TUBES OUT    CONTINUE  BB ASA STATINS    NSTEMI (non-ST elevated myocardial infarction)  Plan for CABG on Monday per CTS  Continue ASA BB  Chest pain free    7/18/2022 chest pain free awaiting cabg    7/21/22  -See plan under CABG    7/23/2022    S/P LIMA TO LAD AND SVG TO OM  RCA OCCLUDED NON REVASCULARIZABLE DUE TO POOR TARGET.    Elevated troponin  77 y.o. female patient with a PMHx of CKD, CAD s/p cardiac stenting, DM2, heart failure, HLD, HTN, NSTEMI, seizure, tobacco dependence, and CVA presents with NSTEMI and CHF.  EKG NSR, ST depression multiple leads. Initial troponin is 12. Pt being dialyzed today.    Continue IV heparin, cardiac cath once CHF stable, being dialyzed today    CAD (coronary artery disease)  Has sevre cad 3 vessel low ef for cabg    7/23/2022  S/P 2 VESSEL CABG    Pulmonary hypertension  ASYMPTOMATIC     ESRD (end stage renal disease)  -Mgmt per nephrology    7/23/2022  FOR DIALYSIS TODAY.    Hypertension associated with diabetes  -Continue BB as BP permits        VTE Risk Mitigation (From admission, onward)         Ordered     heparin (porcine) injection 4,000 Units  As needed (PRN)         07/20/22 1632     IP VTE HIGH RISK PATIENT  Once         07/20/22 1503     Place sequential compression device  Until discontinued         07/19/22 1700                Roya Holman MD  Cardiology  [facility] ([facility])

## 2022-07-23 NOTE — PLAN OF CARE
Pt did well overnight. Hemodynamically stable. NSR on monitor. Mirinone gtt continued at set rate per MD. Tolerating room air. Incisions WNL. Pt denies pain. Serosanguinous drainage per CTs; 60mL from m/s and 310mL from pleural CTs; consistent w/OP on day shift and MD aware. Evening scheduled Levemir held. PM CBG 61; tx w/ PRN D10 bolus. Pt able to eat jello; this AM reports appetite, plan to advance diet. Q2 turns w/wedge; sternal precautions reinforced; full CHG bath this AM, pt able to stand to recliner w/ 2 minimal assist. Fall precautions in place, bed alarm on. Plan to evaluate for HD this AM. POC discussed w/patient and family, verbalized understanding.

## 2022-07-23 NOTE — PROGRESS NOTES
Subjective:      Patient ID: Avril Monzon is a 77 y.o. female.    Chief Complaint: Chest Pain (Seen at urgent care yesterday; c/o sharp pain with shortness of breath; hx of MI three years ago)    HPI:  77-year-old female who was admitted with the NSTEMI had i left heart catheterization which revealed 3 vessel coronary artery disease.  Patient has previous PCI to her proximal LAD and she has the diabetic hypertension hyperlipidemia end-stage renal disease on dialysis.  She also had pulmonary hypertension with EF of 40% global hypokinesis he moderate mitral regurgitation preop.  She was chronically anemic and history of smoking in the past she has diabetic retinopathy.  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  The patient had multiple dialysis she was also dated for the Plavix washout before she was taken up for surgery.  The patient had CABG x2 on a beating heart pump assisted coronary artery bypass grafting in the postoperative.  The patient had pulmonary hypertension with right heart failure for which she was assisted with nitric oxide inhalation she came to the ICU and was started on her planned CRRT.  The patient remained coagulopathic and hypo thermic overnight for which she was treated with FFP cryo and also packed cells she is on epinephrine Primacor and intermittent vasopressin she is awake and follows simple commands and this morning her nitric oxide is being weaned off.  7/22/22 the patient nitric oxide has been weaned off she remained hemodynamically stable had AP is downto0 4and a Primacor is 0.2 she is awake following commands  07/23/2022 patient was extubated of nitric oxide sitting up in a chair hemodynamically stable off inotropes her CRRT was stopped yesterday plan is to get dialysis today .  The chest tube output has considerably slowed down serous  drainage.  Review of patient's allergies indicates:   Allergen Reactions    Codeine Swelling    Darvon [propoxyphene] Swelling    Atorvastatin      Muscle twitching       Past Medical History:   Diagnosis Date    Acute hypoxemic respiratory failure 2018    Anemia     Arthritis     back, hand, knees    Back pain     Cataract     CKD stage G4/A3, GFR 15 - 29 and albumin creatinine ratio >300 mg/g     GFR 40% 2014 and 33% ub 3/2014 ([facility])    Coronary artery disease involving native coronary artery of native heart 2018    Diabetic retinopathy     DM (diabetes mellitus) type II controlled, neurological manifestation     Exudative age-related macular degeneration of left eye with active choroidal neovascularization 2019    GERD (gastroesophageal reflux disease)     Glaucoma     Heart failure     Hyperlipidemia     Hypertension     Non-ST elevation MI (NSTEMI) 2018    NSTEMI (non-ST elevated myocardial infarction) 2018    Peripheral neuropathy     Polyneuropathy     Proteinuria     >6 mo     Seizures     [facility] 2014 -dick CT NRI showed small vessel    Stroke ,    Tobacco dependence     Type 2 diabetes with peripheral circulatory disorder, controlled        Family History   Problem Relation Age of Onset    Diabetes Mother     Hyperlipidemia Mother     Hypertension Mother     Heart disease Mother         MI    Muscular dystrophy Son     Cancer Maternal Grandmother         colon       Social History     Socioeconomic History    Marital status:    Tobacco Use    Smoking status: Former Smoker     Packs/day: 1.50     Years: 30.00     Pack years: 45.00     Types: Cigarettes     Quit date: 1990     Years since quittin.5    Smokeless tobacco: Former User   Substance and Sexual Activity    Alcohol use: No     Alcohol/week: 0.0 standard drinks    Drug use: No    Sexual activity: Not Currently       Past Surgical History:   Procedure Laterality  Date    BREAST LUMPECTOMY Left     benign, when patient was 15 years old    BREAST MASS EXCISION      ,benign, age 15.    CATHETERIZATION OF BOTH LEFT AND RIGHT HEART N/A 11/28/2018    Procedure: CATHETERIZATION, HEART, BOTH LEFT AND RIGHT;  Surgeon: Michelle Holman MD;  Location: [facility] C[facility];  Service: Cardiology;  Laterality: N/A;    CATHETERIZATION OF BOTH LEFT AND RIGHT HEART N/A 11/30/2018    Procedure: CATHETERIZATION, HEART, BOTH LEFT AND RIGHT;  Surgeon: Michelle Holman MD;  Location: [facility] CATH LAB;  Service: Cardiology;  Laterality: N/A;    CORONARY ARTERY BYPASS GRAFT (CABG) N/A 7/20/2022    Procedure: CORONARY ARTERY BYPASS GRAFT (CABG);  Surgeon: Sanju Locke MD;  Location: [facility] OR;  Service: Cardiothoracic;  Laterality: N/A;  2-VESSEL PUMP ASSIST WITH EPI-AORTIC ULTRASOUND    ENDOSCOPIC HARVEST OF VEIN Left 7/20/2022    Procedure: SURGICAL PROCUREMENT, VEIN, ENDOSCOPIC;  Surgeon: Sanju Locke MD;  Location: [facility];  Service: Cardiothoracic;  Laterality: Left;    HYSTERECTOMY  1982    INJECTION OF ANESTHETIC AGENT AROUND MULTIPLE INTERCOSTAL NERVES N/A 7/20/2022    Procedure: BLOCK, NERVE, INTERCOSTAL, 2 OR MORE;  Surgeon: Sanju Locke MD;  Location: [facility];  Service: Cardiothoracic;  Laterality: N/A;  PARASTERNAL NERVE BLOCK    INSERTION OF SHUNT      LEFT HEART CATHETERIZATION Left 12/3/2018    Procedure: CATHETERIZATION, HEART, LEFT;  Surgeon: Michelle Holman MD;  Location: [facility] C[facility];  Service: Cardiology;  Laterality: Left;  LAD ATHERECTOMY STENT/ FEMORAL APPROACH    LEFT HEART CATHETERIZATION Left 7/13/2022    Procedure: CATHETERIZATION, HEART, LEFT;  Surgeon: Abhi Sloan MD;  Location: [facility];  Service: Cardiology;  Laterality: Left;    OOPHORECTOMY      wrist cyst Right 1980s    dorsal wrist cyst       Review of Systems   Unable to perform ROS: Acuity of condition   Constitutional: Negative for activity change and appetite change.   HENT: Negative  "for dental problem, nosebleeds and sore throat.    Eyes: Negative for discharge and visual disturbance.   Respiratory: Negative for cough, chest tightness and stridor.    Cardiovascular: Negative for leg swelling.   Gastrointestinal: Negative for abdominal distention and abdominal pain.   Genitourinary: Negative for difficulty urinating and dysuria.   Musculoskeletal: Negative for arthralgias, back pain and joint swelling.   Allergic/Immunologic: Negative for environmental allergies.   Neurological: Negative for dizziness, syncope and headaches.   Hematological: Does not bruise/bleed easily.   Psychiatric/Behavioral: Negative for behavioral problems.          Objective:   BP (!) 159/71   Pulse 86   Temp 98 °F (36.7 °C) (Oral)   Resp (!) 26   Ht 5' 5" (1.651 m)   Wt 64 kg (141 lb 1.5 oz)   LMP  (LMP Unknown)   SpO2 99%   Breastfeeding No   BMI 23.48 kg/m²     X-Ray Chest AP Portable  Narrative: EXAMINATION:  XR CHEST AP PORTABLE    CLINICAL HISTORY:  Post-op; Atherosclerotic heart disease of native coronary artery without angina pectoris    TECHNIQUE:  Single frontal view of the chest was performed.    COMPARISON:  07/21/2022    FINDINGS:  Tubes and lines are stable.   In comparison to the prior study, there is no adverse interval changes.  Impression: In comparison to the prior study, there is no adverse interval changes    Electronically signed by: Sebastien Trent MD  Date:    07/22/2022  Time:    06:55         Physical Exam  Vitals and nursing note reviewed.   Constitutional:       Appearance: She is ill-appearing.      Comments: Patient is sedated intubated   HENT:      Head: Normocephalic.      Mouth/Throat:      Mouth: Mucous membranes are moist.   Eyes:      Extraocular Movements: Extraocular movements intact.      Pupils: Pupils are equal, round, and reactive to light.   Cardiovascular:      Rate and Rhythm: Normal rate and regular rhythm.      Pulses: Normal pulses.      Heart sounds:     Friction rub " present.   Pulmonary:      Effort: Pulmonary effort is normal.      Breath sounds: Normal breath sounds.   Abdominal:      Palpations: Abdomen is soft.   Musculoskeletal:      Cervical back: Normal range of motion and neck supple.      Right lower leg: Edema present.      Left lower leg: Edema present.   Skin:     General: Skin is warm.      Capillary Refill: Capillary refill takes less than 2 seconds.   Neurological:      General: No focal deficit present.      Mental Status: She is alert and oriented to person, place, and time. Mental status is at baseline.   Psychiatric:         Mood and Affect: Mood normal.     Chest x-ray shows postsurgical changes with small left-sided effusion and bibasilar atelectasis    Assessment:     1. Coronary artery disease involving native heart, unspecified vessel or lesion type, unspecified whether angina present    2. Chest pain    3. NSTEMI (non-ST elevated myocardial infarction)    4. Diabetic ketoacidosis without coma associated with other specified diabetes mellitus    5. Hyperkalemia    6. ESRD (end stage renal disease)    7. Elevated troponin I level    8. Congestive heart failure, unspecified HF chronicity, unspecified heart failure type    9. Elevated troponin    10. Post-operative state    11. S/P CABG (coronary artery bypass graft)    12. CAD (coronary artery disease)    13. DKA (diabetic ketoacidosis)    14. Hypertension associated with diabetes    15. Metabolic acidosis    16. On mechanically assisted ventilation          Plan   CABG x2 postop day 3 severe pulmonary hypertension end-stage renal disease on dialysis  Aggressive pulmonary toilet  Aspirin  Hyperlipidemia she is allergic to statins  Hypertension beta-blockers  Anemia acute blood loss postoperative continue to monitor  Coagulopathy resolved  Diabetes mellitus on insulin sliding scale and long-acting on a diabetic diet  DVT and GI prophylaxis Pepcid and SCDs  Nutrition diabetic diet  End-stage renal disease  hemodialysis today for fluid overload  Will discontinue the THIERRY drain and the groin lines today  ICU stay for dialysis pop    I spent 35 minutes rounding on the patient with multi disciplinary team and patient care          Sanju Locke MD  [facility]  [place]

## 2022-07-23 NOTE — SUBJECTIVE & OBJECTIVE
Interval History: 7/23 days 3 status post CABG x2.  Extubated yesterday.  On room air.  Had a bowel movement this morning.  Epinephrine Milrinone Precedex discontinued.  Chest tubes output 900 mL.     Review of Systems   Constitutional:  Positive for malaise/fatigue. Negative for chills and fever.   HENT:  Negative for hearing loss and nosebleeds.    Eyes:  Negative for discharge.   Respiratory:  Positive for cough and shortness of breath.    Cardiovascular:  Positive for leg swelling. Negative for chest pain.   Gastrointestinal:  Negative for abdominal pain.   Genitourinary:  Negative for hematuria.   Musculoskeletal:  Negative for falls.   Skin:  Negative for itching.   Neurological:  Positive for weakness. Negative for speech change, seizures and loss of consciousness.   Endo/Heme/Allergies:  Negative for polydipsia. Does not bruise/bleed easily.   Psychiatric/Behavioral:  Negative for hallucinations.        Objective:     Vital Signs (Most Recent):  Temp: 98 °F (36.7 °C) (07/23/22 0715)  Pulse: 86 (07/23/22 0748)  Resp: (!) 26 (07/23/22 0748)  BP: (!) 159/71 (07/23/22 0748)  SpO2: 99 % (07/23/22 0748)   Vital Signs (24h Range):  Temp:  [98 °F (36.7 °C)-99.5 °F (37.5 °C)] 98 °F (36.7 °C)  Pulse:  [] 86  Resp:  [13-36] 26  SpO2:  [92 %-100 %] 99 %  BP: (107-170)/(54-74) 159/71  Arterial Line BP: (103-133)/(41-52) 119/41     Weight: 64 kg (141 lb 1.5 oz)  Body mass index is 23.48 kg/m².      Intake/Output Summary (Last 24 hours) at 7/23/2022 1000  Last data filed at 7/23/2022 0700  Gross per 24 hour   Intake 1468.19 ml   Output 1884 ml   Net -415.81 ml       Physical Exam  Vitals and nursing note reviewed.   Constitutional:       General: She is not in acute distress.     Appearance: She is well-developed. She is ill-appearing.   HENT:      Head: Normocephalic and atraumatic.      Nose: Nose normal.   Eyes:      Extraocular Movements: Extraocular movements intact.   Neck:      Comments: Right IJ  introducer  Cardiovascular:      Rate and Rhythm: Normal rate and regular rhythm.   Pulmonary:      Effort: Pulmonary effort is normal.      Breath sounds: No stridor.      Comments: Sternal wound dressing  Abdominal:      General: There is no distension.   Genitourinary:     Comments: Right femoral dialysis catheter  Musculoskeletal:         General: No signs of injury.      Cervical back: Normal range of motion and neck supple.   Skin:     General: Skin is dry.   Neurological:      General: No focal deficit present.      Mental Status: She is alert and oriented to person, place, and time. Mental status is at baseline.   Psychiatric:         Behavior: Behavior normal.       Vents:  Vent Mode: (S) Spont (07/22/22 0800)  Ventilator Initiated: Yes (07/20/22 1409)  Set Rate: 0 BPM (07/22/22 0800)  Vt Set: 375 mL (07/22/22 0800)  Pressure Support: 10 cmH20 (07/22/22 0800)  PEEP/CPAP: 5 cmH20 (07/22/22 0800)  Oxygen Concentration (%): 30 (07/22/22 0900)  Peak Airway Pressure: 15 cmH2O (07/22/22 0800)  Plateau Pressure: 20 cmH20 (07/22/22 0800)  Total Ve: 8.84 mL (07/22/22 0800)  F/VT Ratio<105 (RSBI): (!) 92.74 (07/22/22 0800)    Lines/Drains/Airways       Central Venous Catheter Line  Duration                  Hemodialysis Catheter 07/20/22 1348 right femoral 2 days     Introducer with Double Lumen 07/20/22 1400 right internal jugular 2 days              Drain  Duration                  Hemodialysis AV Fistula 04/01/20 2203 Left upper arm 842 days         Closed/Suction Drain 07/20/22 1154 Left Leg Bulb 19 Fr. 2 days         Y Chest Tube 1 and 2 07/20/22 1204 1 Right Mediastinal 24 Fr. 2 Left Mediastinal 24 Fr. 2 days         Y Chest Tube 3 and 4 07/20/22 1205 3 Left Pleural 24 Fr. 4 Right Pleural 24 Fr. 2 days              Line  Duration                  Pacer Wires 07/20/22 1400 2 days              Peripheral Intravenous Line  Duration                  Peripheral IV - Single Lumen 07/18/22 0625 20 G Posterior;Right  Wrist 5 days                    Significant Labs:    CBC/Anemia Profile:  Recent Labs   Lab 07/21/22  1316 07/22/22  0421   WBC 8.90 11.08   HGB 9.9* 8.9*   HCT 27.4* 25.2*   PLT 65* 109*   MCV 84 85   RDW 14.3 16.1*        Chemistries:  Recent Labs   Lab 07/21/22  2157 07/22/22  0421 07/22/22  0817 07/22/22  1414 07/22/22  1709    137  137  --  139 138   K 4.6 5.0  5.0  --  4.8 4.8    106  106  --  107 107   CO2 19* 22*  21*  --  24 24   BUN 13 11  11  --  13 13   CREATININE 2.0* 1.9*  1.8*  --  1.8* 2.0*   CALCIUM 8.3* 8.3*  8.3*  --  9.0 8.6*   ALBUMIN 3.3* 3.1*  --  3.2*  --    MG 2.1 2.2 2.1 2.3 2.3   PHOS 2.5* 2.9  3.0  --  3.6  --       Latest Reference Range & Units 07/12/22 20:20 07/12/22 22:56 07/13/22 02:00   Troponin I 0.000 - 0.026 ng/mL >50.000 (H) >50.000 (H) >50.000 (H)   (H): Data is abnormally high   Latest Reference Range & Units 07/22/22 04:21   POC PH 7.35 - 7.45  7.476 (H)   POC PCO2 35 - 45 mmHg 31.0 (L)   POC PO2 80 - 100 mmHg 79 (L)   POC HCO3 24 - 28 mmol/L 22.9 (L)   POC SATURATED O2 95 - 100 % 97   POC BE -2 to 2 mmol/L -1   FiO2  30   Vt  375   PEEP  5   Sample  ARTERIAL   DelSys  Adult Vent   Allens Test  N/A   Site  Sofia/UAC   Mode  AC/PRVC   Rate  20     EKG 07/12/2022  Vent. Rate : 082 BPM     Atrial Rate : 082 BPM      P-R Int : 148 ms          QRS Dur : 084 ms       QT Int : 442 ms       P-R-T Axes : 070 030 -65 degrees      QTc Int : 516 ms     Normal sinus rhythm   LVH with repolarization abnormality ( Morgan product )   Prolonged QT   When compared with ECG of 12-JUL-2022 10:27,   ST less depressed in Inferior leads   T wave inversion now evident in Anterior leads       2D echo 07/12/2022    Concentric hypertrophy and normal systolic function.  The estimated ejection fraction is 55%.  Normal left ventricular diastolic function.  Normal right ventricular size with normal right ventricular systolic function.  Moderate tricuspid regurgitation.  There is  pulmonary hypertension.              Significant Imaging:      Chest x-ray 07/22/2022    CLINICAL HISTORY:  Post-op; Atherosclerotic heart disease of native coronary artery without angina pectoris     TECHNIQUE:  Single frontal view of the chest was performed.     COMPARISON:  07/21/2022     FINDINGS:  Tubes and lines are stable.   In comparison to the prior study, there is no adverse interval changes

## 2022-07-24 LAB
ALBUMIN SERPL BCP-MCNC: 2.8 G/DL (ref 3.5–5.2)
ALBUMIN SERPL BCP-MCNC: 2.9 G/DL (ref 3.5–5.2)
ALBUMIN SERPL BCP-MCNC: 2.9 G/DL (ref 3.5–5.2)
ANION GAP SERPL CALC-SCNC: 11 MMOL/L (ref 8–16)
ANION GAP SERPL CALC-SCNC: 11 MMOL/L (ref 8–16)
ANION GAP SERPL CALC-SCNC: 12 MMOL/L (ref 8–16)
ANION GAP SERPL CALC-SCNC: 8 MMOL/L (ref 8–16)
BASOPHILS # BLD AUTO: 0.02 K/UL (ref 0–0.2)
BASOPHILS NFR BLD: 0.2 % (ref 0–1.9)
BUN SERPL-MCNC: 24 MG/DL (ref 8–23)
BUN SERPL-MCNC: 28 MG/DL (ref 8–23)
BUN SERPL-MCNC: 30 MG/DL (ref 8–23)
BUN SERPL-MCNC: 34 MG/DL (ref 8–23)
CALCIUM SERPL-MCNC: 8.3 MG/DL (ref 8.7–10.5)
CALCIUM SERPL-MCNC: 8.6 MG/DL (ref 8.7–10.5)
CALCIUM SERPL-MCNC: 8.7 MG/DL (ref 8.7–10.5)
CALCIUM SERPL-MCNC: 8.7 MG/DL (ref 8.7–10.5)
CHLORIDE SERPL-SCNC: 100 MMOL/L (ref 95–110)
CHLORIDE SERPL-SCNC: 101 MMOL/L (ref 95–110)
CHLORIDE SERPL-SCNC: 99 MMOL/L (ref 95–110)
CHLORIDE SERPL-SCNC: 99 MMOL/L (ref 95–110)
CO2 SERPL-SCNC: 24 MMOL/L (ref 23–29)
CO2 SERPL-SCNC: 24 MMOL/L (ref 23–29)
CO2 SERPL-SCNC: 26 MMOL/L (ref 23–29)
CO2 SERPL-SCNC: 26 MMOL/L (ref 23–29)
CREAT SERPL-MCNC: 2.9 MG/DL (ref 0.5–1.4)
CREAT SERPL-MCNC: 3.5 MG/DL (ref 0.5–1.4)
CREAT SERPL-MCNC: 3.6 MG/DL (ref 0.5–1.4)
CREAT SERPL-MCNC: 4 MG/DL (ref 0.5–1.4)
DIFFERENTIAL METHOD: ABNORMAL
EOSINOPHIL # BLD AUTO: 0.1 K/UL (ref 0–0.5)
EOSINOPHIL NFR BLD: 0.4 % (ref 0–8)
ERYTHROCYTE [DISTWIDTH] IN BLOOD BY AUTOMATED COUNT: 16.2 % (ref 11.5–14.5)
EST. GFR  (AFRICAN AMERICAN): 12 ML/MIN/1.73 M^2
EST. GFR  (AFRICAN AMERICAN): 14 ML/MIN/1.73 M^2
EST. GFR  (AFRICAN AMERICAN): 14 ML/MIN/1.73 M^2
EST. GFR  (AFRICAN AMERICAN): 18 ML/MIN/1.73 M^2
EST. GFR  (NON AFRICAN AMERICAN): 10 ML/MIN/1.73 M^2
EST. GFR  (NON AFRICAN AMERICAN): 12 ML/MIN/1.73 M^2
EST. GFR  (NON AFRICAN AMERICAN): 12 ML/MIN/1.73 M^2
EST. GFR  (NON AFRICAN AMERICAN): 15 ML/MIN/1.73 M^2
GLUCOSE SERPL-MCNC: 102 MG/DL (ref 70–110)
GLUCOSE SERPL-MCNC: 156 MG/DL (ref 70–110)
GLUCOSE SERPL-MCNC: 167 MG/DL (ref 70–110)
GLUCOSE SERPL-MCNC: 174 MG/DL (ref 70–110)
HCT VFR BLD AUTO: 30.5 % (ref 37–48.5)
HGB BLD-MCNC: 9.8 G/DL (ref 12–16)
IMM GRANULOCYTES # BLD AUTO: 0.05 K/UL (ref 0–0.04)
IMM GRANULOCYTES NFR BLD AUTO: 0.4 % (ref 0–0.5)
LYMPHOCYTES # BLD AUTO: 0.8 K/UL (ref 1–4.8)
LYMPHOCYTES NFR BLD: 5.9 % (ref 18–48)
MAGNESIUM SERPL-MCNC: 2.8 MG/DL (ref 1.6–2.6)
MAGNESIUM SERPL-MCNC: 2.9 MG/DL (ref 1.6–2.6)
MAGNESIUM SERPL-MCNC: 3 MG/DL (ref 1.6–2.6)
MCH RBC QN AUTO: 29.6 PG (ref 27–31)
MCHC RBC AUTO-ENTMCNC: 32.1 G/DL (ref 32–36)
MCV RBC AUTO: 92 FL (ref 82–98)
MONOCYTES # BLD AUTO: 0.6 K/UL (ref 0.3–1)
MONOCYTES NFR BLD: 4.6 % (ref 4–15)
NEUTROPHILS # BLD AUTO: 11.5 K/UL (ref 1.8–7.7)
NEUTROPHILS NFR BLD: 88.5 % (ref 38–73)
NRBC BLD-RTO: 0 /100 WBC
PHOSPHATE SERPL-MCNC: 2.1 MG/DL (ref 2.7–4.5)
PHOSPHATE SERPL-MCNC: 2.3 MG/DL (ref 2.7–4.5)
PHOSPHATE SERPL-MCNC: 2.3 MG/DL (ref 2.7–4.5)
PLATELET # BLD AUTO: 115 K/UL (ref 150–450)
PMV BLD AUTO: 9.8 FL (ref 9.2–12.9)
POCT GLUCOSE: 116 MG/DL (ref 70–110)
POCT GLUCOSE: 165 MG/DL (ref 70–110)
POCT GLUCOSE: 188 MG/DL (ref 70–110)
POCT GLUCOSE: 191 MG/DL (ref 70–110)
POCT GLUCOSE: 309 MG/DL (ref 70–110)
POTASSIUM SERPL-SCNC: 3.6 MMOL/L (ref 3.5–5.1)
POTASSIUM SERPL-SCNC: 3.7 MMOL/L (ref 3.5–5.1)
POTASSIUM SERPL-SCNC: 3.8 MMOL/L (ref 3.5–5.1)
POTASSIUM SERPL-SCNC: 3.9 MMOL/L (ref 3.5–5.1)
RBC # BLD AUTO: 3.31 M/UL (ref 4–5.4)
SODIUM SERPL-SCNC: 134 MMOL/L (ref 136–145)
SODIUM SERPL-SCNC: 135 MMOL/L (ref 136–145)
SODIUM SERPL-SCNC: 135 MMOL/L (ref 136–145)
SODIUM SERPL-SCNC: 137 MMOL/L (ref 136–145)
WBC # BLD AUTO: 12.99 K/UL (ref 3.9–12.7)

## 2022-07-24 PROCEDURE — 80069 RENAL FUNCTION PANEL: CPT | Mod: 91 | Performed by: INTERNAL MEDICINE

## 2022-07-24 PROCEDURE — 99232 SBSQ HOSP IP/OBS MODERATE 35: CPT | Mod: ,,, | Performed by: INTERNAL MEDICINE

## 2022-07-24 PROCEDURE — 99291 CRITICAL CARE FIRST HOUR: CPT | Mod: ,,, | Performed by: INTERNAL MEDICINE

## 2022-07-24 PROCEDURE — 85025 COMPLETE CBC W/AUTO DIFF WBC: CPT | Performed by: THORACIC SURGERY (CARDIOTHORACIC VASCULAR SURGERY)

## 2022-07-24 PROCEDURE — 99291 PR CRITICAL CARE, E/M 30-74 MINUTES: ICD-10-PCS | Mod: ,,, | Performed by: INTERNAL MEDICINE

## 2022-07-24 PROCEDURE — 99232 PR SUBSEQUENT HOSPITAL CARE,LEVL II: ICD-10-PCS | Mod: ,,, | Performed by: INTERNAL MEDICINE

## 2022-07-24 PROCEDURE — 94761 N-INVAS EAR/PLS OXIMETRY MLT: CPT

## 2022-07-24 PROCEDURE — 83735 ASSAY OF MAGNESIUM: CPT | Performed by: INTERNAL MEDICINE

## 2022-07-24 PROCEDURE — 20000000 HC ICU ROOM

## 2022-07-24 PROCEDURE — 80048 BASIC METABOLIC PNL TOTAL CA: CPT | Performed by: THORACIC SURGERY (CARDIOTHORACIC VASCULAR SURGERY)

## 2022-07-24 PROCEDURE — 27000221 HC OXYGEN, UP TO 24 HOURS

## 2022-07-24 PROCEDURE — 25000003 PHARM REV CODE 250: Performed by: THORACIC SURGERY (CARDIOTHORACIC VASCULAR SURGERY)

## 2022-07-24 PROCEDURE — 83735 ASSAY OF MAGNESIUM: CPT | Mod: 91 | Performed by: THORACIC SURGERY (CARDIOTHORACIC VASCULAR SURGERY)

## 2022-07-24 RX ORDER — CLOPIDOGREL BISULFATE 75 MG/1
75 TABLET ORAL DAILY
Status: DISCONTINUED | OUTPATIENT
Start: 2022-07-24 | End: 2022-07-27 | Stop reason: HOSPADM

## 2022-07-24 RX ADMIN — METOPROLOL TARTRATE 25 MG: 25 TABLET, FILM COATED ORAL at 09:07

## 2022-07-24 RX ADMIN — INSULIN ASPART 8 UNITS: 100 INJECTION, SOLUTION INTRAVENOUS; SUBCUTANEOUS at 11:07

## 2022-07-24 RX ADMIN — OXYCODONE HYDROCHLORIDE AND ACETAMINOPHEN 500 MG: 500 TABLET ORAL at 08:07

## 2022-07-24 RX ADMIN — ASPIRIN 81 MG CHEWABLE TABLET 81 MG: 81 TABLET CHEWABLE at 08:07

## 2022-07-24 RX ADMIN — CYANOCOBALAMIN TAB 1000 MCG 1000 MCG: 1000 TAB at 08:07

## 2022-07-24 RX ADMIN — NEPHROCAP 1 CAPSULE: 1 CAP ORAL at 08:07

## 2022-07-24 RX ADMIN — CLOPIDOGREL 75 MG: 75 TABLET, FILM COATED ORAL at 12:07

## 2022-07-24 RX ADMIN — MUPIROCIN: 20 OINTMENT TOPICAL at 08:07

## 2022-07-24 RX ADMIN — CHLORHEXIDINE GLUCONATE 0.12% ORAL RINSE 10 ML: 1.2 LIQUID ORAL at 09:07

## 2022-07-24 RX ADMIN — CHLORHEXIDINE GLUCONATE 0.12% ORAL RINSE 10 ML: 1.2 LIQUID ORAL at 08:07

## 2022-07-24 RX ADMIN — METOPROLOL TARTRATE 25 MG: 25 TABLET, FILM COATED ORAL at 08:07

## 2022-07-24 RX ADMIN — FOLIC ACID 1 MG: 1 TABLET ORAL at 08:07

## 2022-07-24 RX ADMIN — INSULIN DETEMIR 5 UNITS: 100 INJECTION, SOLUTION SUBCUTANEOUS at 09:07

## 2022-07-24 RX ADMIN — FAMOTIDINE 20 MG: 20 TABLET ORAL at 08:07

## 2022-07-24 RX ADMIN — MUPIROCIN: 20 OINTMENT TOPICAL at 09:07

## 2022-07-24 RX ADMIN — INSULIN DETEMIR 5 UNITS: 100 INJECTION, SOLUTION SUBCUTANEOUS at 08:07

## 2022-07-24 RX ADMIN — INSULIN ASPART 2 UNITS: 100 INJECTION, SOLUTION INTRAVENOUS; SUBCUTANEOUS at 06:07

## 2022-07-24 NOTE — ASSESSMENT & PLAN NOTE
- see s/p CABG assessment & plan  7/23 status post CABG x2.  Aspirin beta-blocker  7/24 aspirin Plavix beta-blocker status post CABG x2

## 2022-07-24 NOTE — SUBJECTIVE & OBJECTIVE
Interval History: Pt was seen and examined in ICU. Labs and meds reviewed. Discussed with other providers. No new events, no SOB. Tolerated HD well yesterday.      Review of patient's allergies indicates:   Allergen Reactions    Codeine Swelling    Darvon [propoxyphene] Swelling    Atorvastatin      Muscle twitching     Current Facility-Administered Medications   Medication Frequency    acetaminophen oral solution 650 mg Q6H PRN    albumin human 5% bottle 25 g PRN    ascorbic acid (vitamin C) tablet 500 mg Daily    aspirin chewable tablet 81 mg Daily    calcium gluconate 1 g in NS IVPB (premixed) Once    chlorhexidine 0.12 % solution 10 mL BID    clopidogreL tablet 75 mg Daily    cyanocobalamin tablet 1,000 mcg Daily    dextrose 10% bolus 125 mL PRN    famotidine tablet 20 mg Daily    fentaNYL 50 mcg/mL injection 50 mcg Q1H PRN    folic acid tablet 1 mg Daily    glucagon (human recombinant) injection 1 mg PRN    heparin (porcine) injection 4,000 Units PRN    hydrALAZINE injection 10 mg Q6H PRN    insulin aspart U-100 pen 1-10 Units Q6H PRN    insulin detemir U-100 pen 5 Units BID    lactated ringers infusion PRN    magnesium hydroxide 400 mg/5 ml suspension 2,400 mg Daily    melatonin tablet 6 mg Nightly PRN    metoprolol tartrate (LOPRESSOR) tablet 25 mg BID    mupirocin 2 % ointment BID    ondansetron injection 4 mg Q12H PRN    polyethylene glycol packet 17 g Daily PRN    promethazine (PHENERGAN) 12.5 mg in dextrose 5 % 50 mL IVPB Q6H PRN    sodium chloride 0.9% flush 10 mL PRN    traMADoL tablet 50 mg TID PRN    vitamin renal formula (B-complex-vitamin c-folic acid) 1 mg per capsule 1 capsule Daily       Objective:     Vital Signs (Most Recent):  Temp: 98.4 °F (36.9 °C) (07/24/22 0715)  Pulse: 74 (07/24/22 1030)  Resp: 18 (07/24/22 1030)  BP: 128/60 (07/24/22 1000)  SpO2: 98 % (07/24/22 1030)  O2 Device (Oxygen Therapy): nasal cannula (07/24/22 0746) Vital Signs (24h Range):  Temp:  [98.4 °F (36.9 °C)] 98.4 °F  (36.9 °C)  Pulse:  [73-89] 74  Resp:  [17-35] 18  SpO2:  [88 %-100 %] 98 %  BP: (121-179)/(57-81) 128/60     Weight: 64 kg (141 lb 1.5 oz) (07/23/22 0559)  Body mass index is 23.48 kg/m².  Body surface area is 1.71 meters squared.    I/O last 3 completed shifts:  In: 837.2 [P.O.:700; I.V.:44.3; IV Piggyback:92.9]  Out: 2952 [Drains:12; Other:2000; Chest Tube:940]    Physical Exam  Vitals and nursing note reviewed.   Constitutional:       Appearance: Normal appearance.   Cardiovascular:      Rate and Rhythm: Normal rate and regular rhythm.      Pulses: Normal pulses.      Heart sounds: Normal heart sounds.   Pulmonary:      Effort: Pulmonary effort is normal.      Breath sounds: Normal breath sounds.   Abdominal:      Palpations: Abdomen is soft.      Tenderness: There is no abdominal tenderness.   Musculoskeletal:      Right lower leg: No edema.      Left lower leg: No edema.   Neurological:      Mental Status: She is alert.       Significant Labs: reviewed  BMP  Lab Results   Component Value Date     (L) 07/24/2022    K 3.9 07/24/2022    CL 99 07/24/2022    CO2 24 07/24/2022    BUN 24 (H) 07/24/2022    CREATININE 2.9 (H) 07/24/2022    CALCIUM 8.7 07/24/2022    ANIONGAP 12 07/24/2022    ESTGFRAFRICA 18 (A) 07/24/2022    EGFRNONAA 15 (A) 07/24/2022     Lab Results   Component Value Date    WBC 12.99 (H) 07/24/2022    HGB 9.8 (L) 07/24/2022    HCT 30.5 (L) 07/24/2022    MCV 92 07/24/2022     (L) 07/24/2022       Significant Imaging: reviewed CXR

## 2022-07-24 NOTE — PROGRESS NOTES
[facility] ([facility])  Nephrology  Progress Note    Patient Name: Avril Monzon  MRN: [id number]  Admission Date: 7/12/2022  Hospital Length of Stay: 12 days  Attending Provider: Sajnu Locke MD   Primary Care Physician: Rose Parks MD  Principal Problem:S/P CABG (coronary artery bypass graft)    Subjective:     HPI: Noted    Interval History: Pt was seen and examined in ICU. Labs and meds reviewed. Discussed with other providers. No new events, no SOB. Tolerated HD well yesterday.      Review of patient's allergies indicates:   Allergen Reactions    Codeine Swelling    Darvon [propoxyphene] Swelling    Atorvastatin      Muscle twitching     Current Facility-Administered Medications   Medication Frequency    acetaminophen oral solution 650 mg Q6H PRN    albumin human 5% bottle 25 g PRN    ascorbic acid (vitamin C) tablet 500 mg Daily    aspirin chewable tablet 81 mg Daily    calcium gluconate 1 g in NS IVPB (premixed) Once    chlorhexidine 0.12 % solution 10 mL BID    clopidogreL tablet 75 mg Daily    cyanocobalamin tablet 1,000 mcg Daily    dextrose 10% bolus 125 mL PRN    famotidine tablet 20 mg Daily    fentaNYL 50 mcg/mL injection 50 mcg Q1H PRN    folic acid tablet 1 mg Daily    glucagon (human recombinant) injection 1 mg PRN    heparin (porcine) injection 4,000 Units PRN    hydrALAZINE injection 10 mg Q6H PRN    insulin aspart U-100 pen 1-10 Units Q6H PRN    insulin detemir U-100 pen 5 Units BID    lactated ringers infusion PRN    magnesium hydroxide 400 mg/5 ml suspension 2,400 mg Daily    melatonin tablet 6 mg Nightly PRN    metoprolol tartrate (LOPRESSOR) tablet 25 mg BID    mupirocin 2 % ointment BID    ondansetron injection 4 mg Q12H PRN    polyethylene glycol packet 17 g Daily PRN    promethazine (PHENERGAN) 12.5 mg in dextrose 5 % 50 mL IVPB Q6H PRN    sodium chloride 0.9% flush 10 mL PRN    traMADoL tablet 50 mg TID PRN    vitamin renal formula  (B-complex-vitamin c-folic acid) 1 mg per capsule 1 capsule Daily       Objective:     Vital Signs (Most Recent):  Temp: 98.4 °F (36.9 °C) (07/24/22 0715)  Pulse: 74 (07/24/22 1030)  Resp: 18 (07/24/22 1030)  BP: 128/60 (07/24/22 1000)  SpO2: 98 % (07/24/22 1030)  O2 Device (Oxygen Therapy): nasal cannula (07/24/22 0746) Vital Signs (24h Range):  Temp:  [98.4 °F (36.9 °C)] 98.4 °F (36.9 °C)  Pulse:  [73-89] 74  Resp:  [17-35] 18  SpO2:  [88 %-100 %] 98 %  BP: (121-179)/(57-81) 128/60     Weight: 64 kg (141 lb 1.5 oz) (07/23/22 0559)  Body mass index is 23.48 kg/m².  Body surface area is 1.71 meters squared.    I/O last 3 completed shifts:  In: 837.2 [P.O.:700; I.V.:44.3; IV Piggyback:92.9]  Out: 2952 [Drains:12; Other:2000; Chest Tube:940]    Physical Exam  Vitals and nursing note reviewed.   Constitutional:       Appearance: Normal appearance.   Cardiovascular:      Rate and Rhythm: Normal rate and regular rhythm.      Pulses: Normal pulses.      Heart sounds: Normal heart sounds.   Pulmonary:      Effort: Pulmonary effort is normal.      Breath sounds: Normal breath sounds.   Abdominal:      Palpations: Abdomen is soft.      Tenderness: There is no abdominal tenderness.   Musculoskeletal:      Right lower leg: No edema.      Left lower leg: No edema.   Neurological:      Mental Status: She is alert.       Significant Labs: reviewed  BMP  Lab Results   Component Value Date     (L) 07/24/2022    K 3.9 07/24/2022    CL 99 07/24/2022    CO2 24 07/24/2022    BUN 24 (H) 07/24/2022    CREATININE 2.9 (H) 07/24/2022    CALCIUM 8.7 07/24/2022    ANIONGAP 12 07/24/2022    ESTGFRAFRICA 18 (A) 07/24/2022    EGFRNONAA 15 (A) 07/24/2022     Lab Results   Component Value Date    WBC 12.99 (H) 07/24/2022    HGB 9.8 (L) 07/24/2022    HCT 30.5 (L) 07/24/2022    MCV 92 07/24/2022     (L) 07/24/2022       Significant Imaging: reviewed CXR    Assessment/Plan:     78 y/o female with ESRD on chronic HD presented with acute  coronary syndrome:     ESRD (end stage renal disease)  ESRD pt on chronic HD q MWF at [facility]  On CRRT from CABG on 7/20/22 until 7/23/22  Good response to CRRT  Switched back to conventional HD  On HD, s/p HD yesterday, tolerated HD well  Next HD in am (proior schedule), orders placed in chart     K normal  Hyponatremia  Metabolic acidosis stable   HTN: BP controlled. Meds reviewed  Anemia: normocytic. On epogen      * Acute coronary events  NSTEMI  S/p CABG on 7/20/22  Hemodynamically stable, doing well clinically      Type 2 diabetes mellitus with hyperglycemia, with long-term current use of insulin  Reviewed the chart.        Plans and recommendations:  As discussed above  Total critical care time spent 40 minutes including time needed to review the records, the   patient evaluation, documentation, discussion with ICU and other providers,   more than 50% of the time was spent on coordination of care and counseling.           Angela Hanson MD  Nephrology  [facility] ([facility])

## 2022-07-24 NOTE — ASSESSMENT & PLAN NOTE
- per CTS mgmt  - Day 2: s/p CABG to    1. pediciled LIMA to LAD   2. Saphenous vein graft to obtuse marginal  - pressors to maintain MAP >65, wean as clinically tolerated  - CT drainage stable  - Cont to monitor CBC  - ASA, plavix, BB, statin as tolerated   7/23 status post CABG x2.  Aspirin beta-blocker.   7/24 Aspirin Plavix beta-blocker

## 2022-07-24 NOTE — EICU
Intervention Initiated From:  Bedside    Reta Communicated with Bedside Nurse regarding:  Medication  Request for melatonin for sleep    Doctor Notified:  Yes  Comments: Dr. Jerome Rivera

## 2022-07-24 NOTE — ASSESSMENT & PLAN NOTE
- per CTS mgmt, will assist   - Intubated 7/20  - BP tenuous on two vasopressors, will attempt to wean as clinically tolerating  - VAP prophylaxis  - daily SAT/SBT   - vent settings reviewed for optimal ventilation  7/23 tolerated extubation on room air today.  7/24 extubated.  O2 sat 98% on 1 liter/minute.

## 2022-07-24 NOTE — PROGRESS NOTES
[facility] - Intensive Care ([facility])  Critical Care Medicine  Progress Note    Patient Name: Avril Monzon  MRN: [id number]  Admission Date: 7/12/2022  Hospital Length of Stay: 12 days  Code Status: Full Code  Attending Provider: Sanju Locke MD  Primary Care Provider: Rose Parks MD   Principal Problem: S/P CABG (coronary artery bypass graft)    Subjective:     HPI:  77W pmh ESRD, DM, CAD s/p stent, HFpEF (Gr2 DD), HLD, HTN, NSTEMI, seizure, tobacco dependence, and CVA presents with NSTEMI .  Her hospital course has been complicated by DKA.    In ED, EKG with multiple leads with ST depressions.  Initial Troponin approx 12.  She is undergoing HD for volume mgmt.   She is on the DKA protocol with insulin drip, fluids and serial electrolytes.      Hospital/ICU Course:  7/12: Aspirin.  Heparin drip for NSTEMI.  Insulin drip for DKA.  Undergoing HD.  7/20: Day 0: CABG x2. Arrives to ICU intubated. 2 PRBCs received intra-op. Currently on milrinone, epi, & nitric oxide @ 40ppm.   7/21: Day 1: CABG x 2. Remains intubated. Awakens to physical stimuli and follows commands. Increased output from chest tubes, cryo administered overnight. 1/3 PRBCs currently infusing. Rpt labs pending. Nitric @ 18ppm. Remains on Epi, vaso, milronone. Currently on CRRT   7/22: Day 2- s/p CABG x2. Nitric weaned yesterday afernoon. SBT failed- RR >45-50 while on spontaneous, able to tolerate for ~1 hr, will re-attempt today. Labs stable.    7/23 days 3 status post CABG x2.  Extubated yesterday.  On room air.  Had a bowel movement this morning.  Epinephrine Milrinone Precedex discontinued.  Chest tubes output 900 mL.   7/24 O2 sat 98% 1 liter/minute.  Day 4 status post CABG x2 status post HD yesterday 2 L removed.  Afebrile .  Positive 8.4 L since admission.      Interval History:     7/24 O2 sat 98% 1 liter/minute.  Day 4 status post CABG x2 status post HD yesterday 2 L removed.  Afebrile .  Positive 8.4 L since admission.    Review of  Systems   Constitutional:  Positive for malaise/fatigue. Negative for chills and fever.   HENT:  Negative for hearing loss and nosebleeds.    Eyes:  Negative for discharge.   Respiratory:  Positive for cough and shortness of breath.    Cardiovascular:  Positive for leg swelling. Negative for chest pain.   Gastrointestinal:  Negative for abdominal pain.   Genitourinary:  Negative for hematuria.   Musculoskeletal:  Negative for falls.   Skin:  Negative for itching.   Neurological:  Positive for weakness. Negative for speech change, seizures and loss of consciousness.   Endo/Heme/Allergies:  Negative for polydipsia. Does not bruise/bleed easily.   Psychiatric/Behavioral:  Negative for hallucinations.        Objective:     Vital Signs (Most Recent):  Temp: 98.4 °F (36.9 °C) (07/24/22 0715)  Pulse: 74 (07/24/22 1030)  Resp: 18 (07/24/22 1030)  BP: 128/60 (07/24/22 1000)  SpO2: 98 % (07/24/22 1030)   Vital Signs (24h Range):  Temp:  [97.7 °F (36.5 °C)-98.4 °F (36.9 °C)] 98.4 °F (36.9 °C)  Pulse:  [73-89] 74  Resp:  [17-35] 18  SpO2:  [88 %-100 %] 98 %  BP: (121-179)/(57-81) 128/60     Weight: 64 kg (141 lb 1.5 oz)  Body mass index is 23.48 kg/m².      Intake/Output Summary (Last 24 hours) at 7/24/2022 1058  Last data filed at 7/24/2022 0800  Gross per 24 hour   Intake 240 ml   Output 2320 ml   Net -2080 ml         Physical Exam  Vitals and nursing note reviewed.   Constitutional:       General: She is not in acute distress.     Appearance: She is well-developed. She is ill-appearing.   HENT:      Head: Normocephalic and atraumatic.      Nose: Nose normal.   Eyes:      Extraocular Movements: Extraocular movements intact.   Neck:      Comments: Right IJ introducer  Cardiovascular:      Rate and Rhythm: Normal rate and regular rhythm.   Pulmonary:      Effort: Pulmonary effort is normal.      Breath sounds: No stridor.      Comments: Sternal wound dressing  Abdominal:      General: There is no distension.   Genitourinary:      Comments: Right femoral dialysis catheter  Musculoskeletal:         General: No signs of injury.      Cervical back: Normal range of motion and neck supple.   Skin:     General: Skin is dry.   Neurological:      General: No focal deficit present.      Mental Status: She is alert and oriented to person, place, and time. Mental status is at baseline.   Psychiatric:         Behavior: Behavior normal.       Vents:  Vent Mode: (S) Spont (07/22/22 0800)  Ventilator Initiated: Yes (07/20/22 1409)  Set Rate: 0 BPM (07/22/22 0800)  Vt Set: 375 mL (07/22/22 0800)  Pressure Support: 10 cmH20 (07/22/22 0800)  PEEP/CPAP: 5 cmH20 (07/22/22 0800)  Oxygen Concentration (%): 30 (07/22/22 0900)  Peak Airway Pressure: 15 cmH2O (07/22/22 0800)  Plateau Pressure: 20 cmH20 (07/22/22 0800)  Total Ve: 8.84 mL (07/22/22 0800)  F/VT Ratio<105 (RSBI): (!) 92.74 (07/22/22 0800)    Lines/Drains/Airways       Central Venous Catheter Line  Duration              Introducer with Double Lumen 07/20/22 1400 right internal jugular 3 days              Drain  Duration                  Hemodialysis AV Fistula 04/01/20 2203 Left upper arm 843 days              Peripheral Intravenous Line  Duration                  Peripheral IV - Single Lumen 07/18/22 0625 20 G Posterior;Right Wrist 6 days                    Significant Labs:    CBC/Anemia Profile:  Recent Labs   Lab 07/23/22  1105 07/24/22  0428   WBC 13.41* 12.99*   HGB 9.4* 9.8*   HCT 27.7* 30.5*   PLT 87* 115*   MCV 90 92   RDW 16.7* 16.2*          Chemistries:  Recent Labs   Lab 07/22/22  1709 07/23/22  1102 07/23/22  1836 07/24/22  0428    136  136 137  137 135*   K 4.8 5.0  5.0 3.0*  3.0* 3.9    105  105 98  97 99   CO2 24 20*  20* 26  25 24   BUN 13 25*  25* 8  8 24*   CREATININE 2.0* 3.2*  3.2* 1.1  1.1 2.9*   CALCIUM 8.6* 8.9  8.9 9.1  9.1 8.7   ALBUMIN  --  3.1* 3.8 2.9*   MG 2.3 2.8* 2.2  2.2  --    PHOS  --  3.7  3.7 1.3* 2.3*        Latest Reference Range & Units  07/12/22 20:20 07/12/22 22:56 07/13/22 02:00   Troponin I 0.000 - 0.026 ng/mL >50.000 (H) >50.000 (H) >50.000 (H)   (H): Data is abnormally high   Latest Reference Range & Units 07/22/22 04:21   POC PH 7.35 - 7.45  7.476 (H)   POC PCO2 35 - 45 mmHg 31.0 (L)   POC PO2 80 - 100 mmHg 79 (L)   POC HCO3 24 - 28 mmol/L 22.9 (L)   POC SATURATED O2 95 - 100 % 97   POC BE -2 to 2 mmol/L -1   FiO2  30   Vt  375   PEEP  5   Sample  ARTERIAL   DelSys  Adult Vent   Allens Test  N/A   Site  Sofia/UAC   Mode  AC/PRVC   Rate  20     EKG 07/12/2022  Vent. Rate : 082 BPM     Atrial Rate : 082 BPM      P-R Int : 148 ms          QRS Dur : 084 ms       QT Int : 442 ms       P-R-T Axes : 070 030 -65 degrees      QTc Int : 516 ms     Normal sinus rhythm   LVH with repolarization abnormality ( [place] product )   Prolonged QT   When compared with ECG of 12-JUL-2022 10:27,   ST less depressed in Inferior leads   T wave inversion now evident in Anterior leads       2D echo 07/12/2022    · Concentric hypertrophy and normal systolic function.  · The estimated ejection fraction is 55%.  · Normal left ventricular diastolic function.  · Normal right ventricular size with normal right ventricular systolic function.  · Moderate tricuspid regurgitation.  · There is pulmonary hypertension.              Significant Imaging:      Chest x-ray 07/22/2022    CLINICAL HISTORY:  Post-op; Atherosclerotic heart disease of native coronary artery without angina pectoris     TECHNIQUE:  Single frontal view of the chest was performed.     COMPARISON:  07/21/2022     FINDINGS:  Tubes and lines are stable.   In comparison to the prior study, there is no adverse interval changes                  ABG  Recent Labs   Lab 07/22/22  0421   PH 7.476*   PO2 79*   PCO2 31.0*   HCO3 22.9*   BE -1     Assessment/Plan:     Pulmonary  On mechanically assisted ventilation  - per CTS mgmt, will assist   - Intubated 7/20  - BP tenuous on two vasopressors, will attempt to wean as  clinically tolerating  - VAP prophylaxis  - daily SAT/SBT   - vent settings reviewed for optimal ventilation  7/23 tolerated extubation on room air today.  7/24 extubated.  O2 sat 98% on 1 liter/minute.    Pulmonary hypertension  - hx of PA pressure 69.17 in 2018  - CTS managing  - PA pressure >70mmHg intraop  - nitric weaned yesterday with PA pressures ~45-50  - CRRT at goal net zero    Cardiac/Vascular  * S/P CABG (coronary artery bypass graft)  - per CTS mgmt  - Day 2: s/p CABG to    1. pediciled LIMA to LAD   2. Saphenous vein graft to obtuse marginal  - pressors to maintain MAP >65, wean as clinically tolerated  - CT drainage stable  - Cont to monitor CBC  - ASA, plavix, BB, statin as tolerated   7/23 status post CABG x2.  Aspirin beta-blocker.   7/24 Aspirin Plavix beta-blocker    NSTEMI (non-ST elevated myocardial infarction)  - see s/p CABG assessment & plan  7/23 status post CABG x2.  Aspirin beta-blocker  7/24 aspirin Plavix beta-blocker status post CABG x2    Elevated troponin  Due to NSTEMI  See assessment and plan for NSTEMI    CAD (coronary artery disease)  - CTS mgmt  - Day 1: s/p CABG   - 7/13 LHC:  · The pre-procedure left ventricular end diastolic pressure was 31.  · The post-procedure left ventricular end diastolic pressure was 32.  · The ejection fraction was calculated to be 40%.  · The Prox RCA to Mid RCA lesion was 100% stenosed.  · The Ost Cx to Prox Cx lesion was 99% stenosed.  · The Prox LAD to Mid LAD lesion was 75% stenosed.  · The estimated blood loss was none.  · There was three vessel coronary artery disease.  7/24 aspirin Plavix beta-blocker status post CABG.    Hypertension associated with diabetes  - per cards/CTS mgmt  - requiring pressor support for hemodynamic, suggest holding BB until more normotensive or SBP >90 and MAP >65 without pressors     Renal/  Metabolic acidosis  - BE improved, HCO3 22.9  - cont to monitor         ESRD (end stage renal disease)  - per nephrology  mgmt  - CRRT remains on at  goal net of zero  - monitor BMP  7/23 off CRRT.  Conventional HD as per Nephrology.  7/24 HD as per Nephrology.       Critical Care Daily Checklist:    A: Awake: RASS Goal/Actual Goal: RASS Goal: 0-->alert and calm  Actual: [place] Agitation Sedation Scale (RASS): Alert and calm   B: Spontaneous Breathing Trial Performed? Spon. Breathing Trial Initiated?: Initiated (07/22/22 0800)   C: SAT & SBT Coordinated?  On 1 liter/minute nasal cannula           D: Delirium: CAM-ICU Overall CAM-ICU: Negative   E: Early Mobility Performed? Yes   F: Feeding Goal: Goals: Patient to tolerate cardiac, renal oral diet.  Status: Nutrition Goal Status: new   Current Diet Order   Procedures    Diet renal     Cardiac + Diabetic restrictions      AS: Analgesia/Sedation Not sedated   T: Thromboembolic Prophylaxis Mechanical prophylaxis   H: HOB > 300 Yes   U: Stress Ulcer Prophylaxis (if needed) Famotidine   G: Glucose Control Fingerstick q.6 hours insulin detemir plus SSI   B: Bowel Function Stool Occurrence: 1   I: Indwelling Catheter (Lines & Arzate) Necessity No arzate   D: De-escalation of Antimicrobials/Pharmacotherapies No antibiotic    Plan for the day/ETD HD    Code Status:  Family/Goals of Care: Full Code     Discussed with CT surgery attending   Critical Care Time: 35 minutes  Critical secondary to Patient has a condition that poses threat to life and bodily function:  Myocardial infarction status post CABG x2 end-stage renal disease on dialysis      Critical care was time spent personally by me on the following activities: development of treatment plan with patient or surrogate and bedside caregivers, discussions with consultants, evaluation of patient's response to treatment, examination of patient, ordering and performing treatments and interventions, ordering and review of laboratory studies, ordering and review of radiographic studies, pulse oximetry, re-evaluation of patient's condition. This  critical care time did not overlap with that of any other provider or involve time for any procedures.     Hattie Stephens MD  Critical Care Medicine  [facility] - Intensive Care [facility])

## 2022-07-24 NOTE — SUBJECTIVE & OBJECTIVE
Interval History:     7/24 O2 sat 98% 1 liter/minute.  Day 4 status post CABG x2 status post HD yesterday 2 L removed.  Afebrile .  Positive 8.4 L since admission.    Review of Systems   Constitutional:  Positive for malaise/fatigue. Negative for chills and fever.   HENT:  Negative for hearing loss and nosebleeds.    Eyes:  Negative for discharge.   Respiratory:  Positive for cough and shortness of breath.    Cardiovascular:  Positive for leg swelling. Negative for chest pain.   Gastrointestinal:  Negative for abdominal pain.   Genitourinary:  Negative for hematuria.   Musculoskeletal:  Negative for falls.   Skin:  Negative for itching.   Neurological:  Positive for weakness. Negative for speech change, seizures and loss of consciousness.   Endo/Heme/Allergies:  Negative for polydipsia. Does not bruise/bleed easily.   Psychiatric/Behavioral:  Negative for hallucinations.        Objective:     Vital Signs (Most Recent):  Temp: 98.4 °F (36.9 °C) (07/24/22 0715)  Pulse: 74 (07/24/22 1030)  Resp: 18 (07/24/22 1030)  BP: 128/60 (07/24/22 1000)  SpO2: 98 % (07/24/22 1030)   Vital Signs (24h Range):  Temp:  [97.7 °F (36.5 °C)-98.4 °F (36.9 °C)] 98.4 °F (36.9 °C)  Pulse:  [73-89] 74  Resp:  [17-35] 18  SpO2:  [88 %-100 %] 98 %  BP: (121-179)/(57-81) 128/60     Weight: 64 kg (141 lb 1.5 oz)  Body mass index is 23.48 kg/m².      Intake/Output Summary (Last 24 hours) at 7/24/2022 1058  Last data filed at 7/24/2022 0800  Gross per 24 hour   Intake 240 ml   Output 2320 ml   Net -2080 ml         Physical Exam  Vitals and nursing note reviewed.   Constitutional:       General: She is not in acute distress.     Appearance: She is well-developed. She is ill-appearing.   HENT:      Head: Normocephalic and atraumatic.      Nose: Nose normal.   Eyes:      Extraocular Movements: Extraocular movements intact.   Neck:      Comments: Right IJ introducer  Cardiovascular:      Rate and Rhythm: Normal rate and regular rhythm.   Pulmonary:       Effort: Pulmonary effort is normal.      Breath sounds: No stridor.      Comments: Sternal wound dressing  Abdominal:      General: There is no distension.   Genitourinary:     Comments: Right femoral dialysis catheter  Musculoskeletal:         General: No signs of injury.      Cervical back: Normal range of motion and neck supple.   Skin:     General: Skin is dry.   Neurological:      General: No focal deficit present.      Mental Status: She is alert and oriented to person, place, and time. Mental status is at baseline.   Psychiatric:         Behavior: Behavior normal.       Vents:  Vent Mode: (S) Spont (07/22/22 0800)  Ventilator Initiated: Yes (07/20/22 1409)  Set Rate: 0 BPM (07/22/22 0800)  Vt Set: 375 mL (07/22/22 0800)  Pressure Support: 10 cmH20 (07/22/22 0800)  PEEP/CPAP: 5 cmH20 (07/22/22 0800)  Oxygen Concentration (%): 30 (07/22/22 0900)  Peak Airway Pressure: 15 cmH2O (07/22/22 0800)  Plateau Pressure: 20 cmH20 (07/22/22 0800)  Total Ve: 8.84 mL (07/22/22 0800)  F/VT Ratio<105 (RSBI): (!) 92.74 (07/22/22 0800)    Lines/Drains/Airways       Central Venous Catheter Line  Duration              Introducer with Double Lumen 07/20/22 1400 right internal jugular 3 days              Drain  Duration                  Hemodialysis AV Fistula 04/01/20 2203 Left upper arm 843 days              Peripheral Intravenous Line  Duration                  Peripheral IV - Single Lumen 07/18/22 0625 20 G Posterior;Right Wrist 6 days                    Significant Labs:    CBC/Anemia Profile:  Recent Labs   Lab 07/23/22  1105 07/24/22  0428   WBC 13.41* 12.99*   HGB 9.4* 9.8*   HCT 27.7* 30.5*   PLT 87* 115*   MCV 90 92   RDW 16.7* 16.2*          Chemistries:  Recent Labs   Lab 07/22/22  1709 07/23/22  1102 07/23/22  1836 07/24/22  0428    136  136 137  137 135*   K 4.8 5.0  5.0 3.0*  3.0* 3.9    105  105 98  97 99   CO2 24 20*  20* 26  25 24   BUN 13 25*  25* 8  8 24*   CREATININE 2.0* 3.2*  3.2* 1.1   1.1 2.9*   CALCIUM 8.6* 8.9  8.9 9.1  9.1 8.7   ALBUMIN  --  3.1* 3.8 2.9*   MG 2.3 2.8* 2.2  2.2  --    PHOS  --  3.7  3.7 1.3* 2.3*        Latest Reference Range & Units 07/12/22 20:20 07/12/22 22:56 07/13/22 02:00   Troponin I 0.000 - 0.026 ng/mL >50.000 (H) >50.000 (H) >50.000 (H)   (H): Data is abnormally high   Latest Reference Range & Units 07/22/22 04:21   POC PH 7.35 - 7.45  7.476 (H)   POC PCO2 35 - 45 mmHg 31.0 (L)   POC PO2 80 - 100 mmHg 79 (L)   POC HCO3 24 - 28 mmol/L 22.9 (L)   POC SATURATED O2 95 - 100 % 97   POC BE -2 to 2 mmol/L -1   FiO2  30   Vt  375   PEEP  5   Sample  ARTERIAL   DelSys  Adult Vent   Allens Test  N/A   Site  Sofia/UAC   Mode  AC/PRVC   Rate  20     EKG 07/12/2022  Vent. Rate : 082 BPM     Atrial Rate : 082 BPM      P-R Int : 148 ms          QRS Dur : 084 ms       QT Int : 442 ms       P-R-T Axes : 070 030 -65 degrees      QTc Int : 516 ms     Normal sinus rhythm   LVH with repolarization abnormality ( [place] product )   Prolonged QT   When compared with ECG of 12-JUL-2022 10:27,   ST less depressed in Inferior leads   T wave inversion now evident in Anterior leads       2D echo 07/12/2022    Concentric hypertrophy and normal systolic function.  The estimated ejection fraction is 55%.  Normal left ventricular diastolic function.  Normal right ventricular size with normal right ventricular systolic function.  Moderate tricuspid regurgitation.  There is pulmonary hypertension.              Significant Imaging:      Chest x-ray 07/22/2022    CLINICAL HISTORY:  Post-op; Atherosclerotic heart disease of native coronary artery without angina pectoris     TECHNIQUE:  Single frontal view of the chest was performed.     COMPARISON:  07/21/2022     FINDINGS:  Tubes and lines are stable.   In comparison to the prior study, there is no adverse interval changes

## 2022-07-24 NOTE — ASSESSMENT & PLAN NOTE
78 y/o female with ESRD on chronic HD presented with acute coronary syndrome:     ESRD (end stage renal disease)  ESRD pt on chronic HD q MWF at [facility]  On CRRT since CABG on 7/20/22 until 7/23/22  Good response to CRRT  Switched back to conventional HD  On HD     K normal  Na normal  Metabolic acidosis stable   HTN: BP stable, slightly elevated, will monitor. Will slowly re-start prior meds.  Meds reviewed  Anemia: normocytic. On epogen      * Acute coronary events  NSTEMI  S/p CABG on 7/20/22  Hemodynamically stable, doing well clinically  Mild fluid overload, being dialyzed today  Discussed with CT surgery      Type 2 diabetes mellitus with hyperglycemia, with long-term current use of insulin  Reviewed the chart.        Plans and recommendations:  As discussed above  Total critical care time spent 40 minutes including time needed to review the records, the   patient evaluation, documentation, discussion with ICU and other providers,   more than 50% of the time was spent on coordination of care and counseling.   Multiple medical issues were addressed, as documented. Medical care provided was in addition to providing dialysis. Pt received multiple visits and evaluations.

## 2022-07-24 NOTE — ASSESSMENT & PLAN NOTE
- CTS mgmt  - Day 1: s/p CABG   - 7/13 LHC:  · The pre-procedure left ventricular end diastolic pressure was 31.  · The post-procedure left ventricular end diastolic pressure was 32.  · The ejection fraction was calculated to be 40%.  · The Prox RCA to Mid RCA lesion was 100% stenosed.  · The Ost Cx to Prox Cx lesion was 99% stenosed.  · The Prox LAD to Mid LAD lesion was 75% stenosed.  · The estimated blood loss was none.  · There was three vessel coronary artery disease.  7/24 aspirin Plavix beta-blocker status post CABG.

## 2022-07-24 NOTE — SUBJECTIVE & OBJECTIVE
Interval History: NO SHORTNESS OF BREATH    Review of Systems   Constitutional: Negative for diaphoresis, malaise/fatigue and weight gain.   HENT:  Negative for hoarse voice.    Eyes:  Negative for double vision and visual disturbance.   Cardiovascular:  Negative for chest pain, claudication, cyanosis, dyspnea on exertion, irregular heartbeat, leg swelling, near-syncope, orthopnea, palpitations, paroxysmal nocturnal dyspnea and syncope.   Respiratory:  Negative for cough, hemoptysis, shortness of breath and snoring.    Hematologic/Lymphatic: Negative for bleeding problem. Does not bruise/bleed easily.   Skin:  Negative for color change and poor wound healing.   Musculoskeletal:  Negative for muscle cramps, muscle weakness and myalgias.   Gastrointestinal:  Negative for bloating, abdominal pain, change in bowel habit, diarrhea, heartburn, hematemesis, hematochezia, melena and nausea.   Neurological:  Negative for excessive daytime sleepiness, dizziness, headaches, light-headedness, loss of balance, numbness and weakness.   Psychiatric/Behavioral:  Negative for memory loss. The patient does not have insomnia.    Allergic/Immunologic: Negative for hives.   Objective:     Vital Signs (Most Recent):  Temp: 97.8 °F (36.6 °C) (07/24/22 1515)  Pulse: 79 (07/24/22 1700)  Resp: (!) 29 (07/24/22 1700)  BP: (!) 165/70 (07/24/22 1700)  SpO2: 100 % (07/24/22 1700)   Vital Signs (24h Range):  Temp:  [97.8 °F (36.6 °C)-98.7 °F (37.1 °C)] 97.8 °F (36.6 °C)  Pulse:  [73-89] 79  Resp:  [16-35] 29  SpO2:  [88 %-100 %] 100 %  BP: (121-179)/(57-81) 165/70     Weight: 64 kg (141 lb 1.5 oz)  Body mass index is 23.48 kg/m².     SpO2: 100 %  O2 Device (Oxygen Therapy): nasal cannula      Intake/Output Summary (Last 24 hours) at 7/24/2022 1714  Last data filed at 7/24/2022 0800  Gross per 24 hour   Intake 120 ml   Output 2190 ml   Net -2070 ml       Lines/Drains/Airways       Central Venous Catheter Line  Duration              Introducer with  Double Lumen 07/20/22 1400 right internal jugular 4 days              Drain  Duration                  Hemodialysis AV Fistula 04/01/20 2203 Left upper arm 843 days              Peripheral Intravenous Line  Duration                  Peripheral IV - Single Lumen 07/18/22 0625 20 G Posterior;Right Wrist 6 days                    Physical Exam  Constitutional:       General: She is not in acute distress.     Appearance: Normal appearance. She is well-developed. She is not ill-appearing.   HENT:      Head: Normocephalic and atraumatic.   Eyes:      General: No scleral icterus.     Pupils: Pupils are equal, round, and reactive to light.   Neck:      Thyroid: No thyromegaly.      Vascular: Normal carotid pulses. No carotid bruit, hepatojugular reflux or JVD.      Trachea: No tracheal deviation.   Cardiovascular:      Rate and Rhythm: Normal rate and regular rhythm.      Pulses: Normal pulses.      Heart sounds: Normal heart sounds. No murmur heard.    No friction rub. No gallop.   Pulmonary:      Effort: Pulmonary effort is normal. No respiratory distress.      Breath sounds: Normal breath sounds. No wheezing, rhonchi or rales.      Comments: SCAR WELL HEALED.  Chest:      Chest wall: No tenderness.   Abdominal:      General: Bowel sounds are normal. There is no abdominal bruit.      Palpations: Abdomen is soft. There is no hepatomegaly or pulsatile mass.      Tenderness: There is no abdominal tenderness.   Musculoskeletal:      Right shoulder: No deformity.      Cervical back: Normal range of motion and neck supple.      Right lower leg: No edema.      Left lower leg: No edema.   Skin:     General: Skin is warm and dry.      Findings: No erythema or rash.      Nails: There is no clubbing.   Neurological:      Mental Status: She is alert and oriented to person, place, and time.      Cranial Nerves: No cranial nerve deficit.      Coordination: Coordination normal.   Psychiatric:         Speech: Speech normal.          Behavior: Behavior normal.         Judgment: Judgment normal.       Significant Labs:   Recent Lab Results  (Last 5 results in the past 24 hours)        07/24/22  1639   07/24/22  1408   07/24/22  1132   07/24/22  0654   07/24/22  0431        Albumin   2.8             Anion Gap   8             Baso #               Basophil %               BUN   28             Calcium   8.3             Chloride   101             CO2   26             Creatinine   3.5             Differential Method               eGFR if    14             eGFR if non    12  Comment: Calculation used to obtain the estimated glomerular filtration  rate (eGFR) is the CKD-EPI equation.                Eos #               Eosinophil %               Glucose   156             Gran # (ANC)               Gran %               Hematocrit               Hemoglobin               Immature Grans (Abs)               Immature Granulocytes               Lymph #               Lymph %               Magnesium   2.8             MCH               MCHC               MCV               Mono #               Mono %               MPV               nRBC               Phosphorus   2.3             Platelets               POCT Glucose 116     309   191   188       Potassium   3.7             RBC               RDW               Sodium   135             WBC                                      Significant Imaging: X-Ray: CXR: X-Ray Chest 1 View (CXR): No results found for this visit on 07/12/22.

## 2022-07-24 NOTE — PROGRESS NOTES
07/23/22 1902   Post-Hemodialysis Assessment   Rinseback Volume (mL) 250 mL   Blood Volume Processed (Liters) 72 L   Dialyzer Clearance Lightly streaked   Total UF (mL) 2000 mL   Patient Response to Treatment Pt tolerated tx well. Total tx time was 180 minutes.

## 2022-07-24 NOTE — ASSESSMENT & PLAN NOTE
- per nephrology mgmt  - CRRT remains on at  goal net of zero  - monitor BMP  7/23 off CRRT.  Conventional HD as per Nephrology.  7/24 HD as per Nephrology.

## 2022-07-24 NOTE — ASSESSMENT & PLAN NOTE
-Continue current post-op care and mgmt as per CTS  -H/H dipped to 4.3/12.0, being transfused  -Wean pressors as tolerated  -ASA, Plavix, BB, statin as condition permits    7/22/22  -Recovering post CABG x 2  -H/H stable this AM   -Resume ASA, Plavix as per CTS  -Continue BB, statin  -IS usage and ambulation when able    7/23/2022   PROGRESSING WELL   IS USE ONGOING WILL HOPE TO GET TUBES OUT    CONTINUE  BB ASA STATINS    7/24/2022 STABLE HEMODYNAMICALLY NO ARRYTHMIAS CONTINUE SAME.

## 2022-07-24 NOTE — PLAN OF CARE
Pt AAOx4, NSR on monitor, VSS on room air. Dr. Garcia removed chest tubes this morning. Plan to step down today awaiting bed placement. HD tomorrow. POC discussed with pt and family at bedside.

## 2022-07-24 NOTE — PROGRESS NOTES
[facility] ([facility]  Cardiology  Progress Note    Patient Name: Avril Monzon  MRN: [id number]  Admission Date: 7/12/2022  Hospital Length of Stay: 12 days  Code Status: Full Code   Attending Physician: Sanju Locke MD   Primary Care Physician: Rose Parks MD  Expected Discharge Date:   Principal Problem:S/P CABG (coronary artery bypass graft)    Subjective:     Hospital Course:   7/14/2022--Patient seen and examined in room, lying in bed. HD in progress. Denies any cardiac complaints today. Awaiting CABG on Monday.     7/15/2022--patient seen and examined in room, denies any complaints on exam today.     7/17/22 No c/o on HD on exam. Patient denies CP, angina or anginal equivalent. CABG tomorrow. Start statin after cabg, continue b blockers    7/18/2022 stable no chest pain . Cabg on hold due equipment need for off pump bypass    7/21/22-Patient seen and examined today, s/p CABG x 2. Increased OP from chest tubes overnight, H/H dipped to 4.3/12.0, being transfused. On pressor support, undergoing CRRT.     7/22/22-Patient seen and examined today, recovering s/p CABG x 2. Extubated, remains weak, complains of nausea and incisional pain. Pressors being weaned. Labs reviewed. H/H 8.9/25.2. Platelets 109,000.    7/23/2022 UP IN CHAIR TODAY NO PAIN NEEDING HEMODIALYSIS. STILL HAS DRAINAGE FROM CHEST TUBE NON FROM MEDIASTINAL TUBE. SHE IS PAIN FREE ALERT ORIENTED. NO ARRYTHMIAS.     7/24/2022 TOLERATED DIALYSIS WELL FEELS WELL NO CHEST PAIN NO ARRYTHMIAS CHEST TUIBE REMOVED.      Interval History: NO SHORTNESS OF BREATH    Review of Systems   Constitutional: Negative for diaphoresis, malaise/fatigue and weight gain.   HENT:  Negative for hoarse voice.    Eyes:  Negative for double vision and visual disturbance.   Cardiovascular:  Negative for chest pain, claudication, cyanosis, dyspnea on exertion, irregular heartbeat, leg swelling, near-syncope, orthopnea, palpitations, paroxysmal nocturnal  dyspnea and syncope.   Respiratory:  Negative for cough, hemoptysis, shortness of breath and snoring.    Hematologic/Lymphatic: Negative for bleeding problem. Does not bruise/bleed easily.   Skin:  Negative for color change and poor wound healing.   Musculoskeletal:  Negative for muscle cramps, muscle weakness and myalgias.   Gastrointestinal:  Negative for bloating, abdominal pain, change in bowel habit, diarrhea, heartburn, hematemesis, hematochezia, melena and nausea.   Neurological:  Negative for excessive daytime sleepiness, dizziness, headaches, light-headedness, loss of balance, numbness and weakness.   Psychiatric/Behavioral:  Negative for memory loss. The patient does not have insomnia.    Allergic/Immunologic: Negative for hives.   Objective:     Vital Signs (Most Recent):  Temp: 97.8 °F (36.6 °C) (07/24/22 1515)  Pulse: 79 (07/24/22 1700)  Resp: (!) 29 (07/24/22 1700)  BP: (!) 165/70 (07/24/22 1700)  SpO2: 100 % (07/24/22 1700)   Vital Signs (24h Range):  Temp:  [97.8 °F (36.6 °C)-98.7 °F (37.1 °C)] 97.8 °F (36.6 °C)  Pulse:  [73-89] 79  Resp:  [16-35] 29  SpO2:  [88 %-100 %] 100 %  BP: (121-179)/(57-81) 165/70     Weight: 64 kg (141 lb 1.5 oz)  Body mass index is 23.48 kg/m².     SpO2: 100 %  O2 Device (Oxygen Therapy): nasal cannula      Intake/Output Summary (Last 24 hours) at 7/24/2022 1714  Last data filed at 7/24/2022 0800  Gross per 24 hour   Intake 120 ml   Output 2190 ml   Net -2070 ml       Lines/Drains/Airways       Central Venous Catheter Line  Duration              Introducer with Double Lumen 07/20/22 1400 right internal jugular 4 days              Drain  Duration                  Hemodialysis AV Fistula 04/01/20 2203 Left upper arm 843 days              Peripheral Intravenous Line  Duration                  Peripheral IV - Single Lumen 07/18/22 0625 20 G Posterior;Right Wrist 6 days                    Physical Exam  Constitutional:       General: She is not in acute distress.      Appearance: Normal appearance. She is well-developed. She is not ill-appearing.   HENT:      Head: Normocephalic and atraumatic.   Eyes:      General: No scleral icterus.     Pupils: Pupils are equal, round, and reactive to light.   Neck:      Thyroid: No thyromegaly.      Vascular: Normal carotid pulses. No carotid bruit, hepatojugular reflux or JVD.      Trachea: No tracheal deviation.   Cardiovascular:      Rate and Rhythm: Normal rate and regular rhythm.      Pulses: Normal pulses.      Heart sounds: Normal heart sounds. No murmur heard.    No friction rub. No gallop.   Pulmonary:      Effort: Pulmonary effort is normal. No respiratory distress.      Breath sounds: Normal breath sounds. No wheezing, rhonchi or rales.      Comments: SCAR WELL HEALED.  Chest:      Chest wall: No tenderness.   Abdominal:      General: Bowel sounds are normal. There is no abdominal bruit.      Palpations: Abdomen is soft. There is no hepatomegaly or pulsatile mass.      Tenderness: There is no abdominal tenderness.   Musculoskeletal:      Right shoulder: No deformity.      Cervical back: Normal range of motion and neck supple.      Right lower leg: No edema.      Left lower leg: No edema.   Skin:     General: Skin is warm and dry.      Findings: No erythema or rash.      Nails: There is no clubbing.   Neurological:      Mental Status: She is alert and oriented to person, place, and time.      Cranial Nerves: No cranial nerve deficit.      Coordination: Coordination normal.   Psychiatric:         Speech: Speech normal.         Behavior: Behavior normal.         Judgment: Judgment normal.       Significant Labs:   Recent Lab Results  (Last 5 results in the past 24 hours)        07/24/22  1639   07/24/22  1408   07/24/22  1132   07/24/22  0654   07/24/22  0431        Albumin   2.8             Anion Gap   8             Baso #               Basophil %               BUN   28             Calcium   8.3             Chloride   101              CO2   26             Creatinine   3.5             Differential Method               eGFR if    14             eGFR if non    12  Comment: Calculation used to obtain the estimated glomerular filtration  rate (eGFR) is the CKD-EPI equation.                Eos #               Eosinophil %               Glucose   156             Gran # (ANC)               Gran %               Hematocrit               Hemoglobin               Immature Grans (Abs)               Immature Granulocytes               Lymph #               Lymph %               Magnesium   2.8             MCH               MCHC               MCV               Mono #               Mono %               MPV               nRBC               Phosphorus   2.3             Platelets               POCT Glucose 116     309   191   188       Potassium   3.7             RBC               RDW               Sodium   135             WBC                                      Significant Imaging: X-Ray: CXR: X-Ray Chest 1 View (CXR): No results found for this visit on 07/12/22.    Assessment and Plan:     Brief HPI: A 76 YO FEMALE WITH CAD S/P 2 VESSEL CABG PROGRESSING WELL WILL FOLLOW ALONG CTS. CONTINUE B BLOCKERS AS STATINS. DIALYSIS FOR FLUID MANAGEMENT.    * S/P CABG (coronary artery bypass graft)  -Continue current post-op care and mgmt as per CTS  -H/H dipped to 4.3/12.0, being transfused  -Wean pressors as tolerated  -ASA, Plavix, BB, statin as condition permits    7/22/22  -Recovering post CABG x 2  -H/H stable this AM   -Resume ASA, Plavix as per CTS  -Continue BB, statin  -IS usage and ambulation when able    7/23/2022   PROGRESSING WELL   IS USE ONGOING WILL HOPE TO GET TUBES OUT    CONTINUE  BB ASA STATINS    7/24/2022 STABLE HEMODYNAMICALLY NO ARRYTHMIAS CONTINUE SAME.    NSTEMI (non-ST elevated myocardial infarction)  Plan for CABG on Monday per CTS  Continue ASA BB  Chest pain free    7/18/2022 chest pain free awaiting  cabg    7/21/22  -See plan under CABG    7/23/2022   S/P LIMA TO LAD AND SVG TO OM  RCA OCCLUDED NON REVASCULARIZABLE DUE TO POOR TARGET.    Elevated troponin  77 y.o. female patient with a PMHx of CKD, CAD s/p cardiac stenting, DM2, heart failure, HLD, HTN, NSTEMI, seizure, tobacco dependence, and CVA presents with NSTEMI and CHF.  EKG NSR, ST depression multiple leads. Initial troponin is 12. Pt being dialyzed today.    Continue IV heparin, cardiac cath once CHF stable, being dialyzed today    CAD (coronary artery disease)  Has sevre cad 3 vessel low ef for cabg    7/23/2022  S/P 2 VESSEL CABG    Pulmonary hypertension  ASYMPTOMATIC     ESRD (end stage renal disease)  -Mgmt per nephrology    7/23/2022  FOR DIALYSIS TODAY.    7/24/2022  TOLERATED DIALYSIS WELL.    Hypertension associated with diabetes  -Continue BB as BP permits        VTE Risk Mitigation (From admission, onward)         Ordered     IP VTE HIGH RISK PATIENT  Once         07/24/22 1032     Place sequential compression device  Until discontinued         07/24/22 1032     heparin (porcine) injection 4,000 Units  As needed (PRN)         07/20/22 1632     Place sequential compression device  Until discontinued         07/19/22 1700                Roya Holman MD  Cardiology  [facility] ([facility])

## 2022-07-24 NOTE — PROGRESS NOTES
Subjective:      Patient ID: Avril Monzon is a 77 y.o. female.    Chief Complaint: Chest Pain (Seen at urgent care yesterday; c/o sharp pain with shortness of breath; hx of MI three years ago)    HPI:  77-year-old female who was admitted with the NSTEMI had i left heart catheterization which revealed 3 vessel coronary artery disease.  Patient has previous PCI to her proximal LAD and she has the diabetic hypertension hyperlipidemia end-stage renal disease on dialysis.  She also had pulmonary hypertension with EF of 40% global hypokinesis he moderate mitral regurgitation preop.  She was chronically anemic and history of smoking in the past she has diabetic retinopathy.  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  The patient had multiple dialysis she was also dated for the Plavix washout before she was taken up for surgery.  The patient had CABG x2 on a beating heart pump assisted coronary artery bypass grafting in the postoperative.  The patient had pulmonary hypertension with right heart failure for which she was assisted with nitric oxide inhalation she came to the ICU and was started on her planned CRRT.  The patient remained coagulopathic and hypo thermic overnight for which she was treated with FFP cryo and also packed cells she is on epinephrine Primacor and intermittent vasopressin she is awake and follows simple commands and this morning her nitric oxide is being weaned off.  7/22/22 the patient nitric oxide has been weaned off she remained hemodynamically stable had AP is downto0 4and a Primacor is 0.2 she is awake following commands  07/23/2022 patient was extubated of nitric oxide sitting up in a chair hemodynamically stable off inotropes her CRRT was stopped yesterday plan is to get dialysis today .  The chest tube output has considerably slowed down serous  drainage.  2022 the patient was extubated yesterday and she got hemodialyzed she is tolerating regular diet she will control has been fair No pain issues good motivation  Review of patient's allergies indicates:   Allergen Reactions    Codeine Swelling    Darvon [propoxyphene] Swelling    Atorvastatin      Muscle twitching       Past Medical History:   Diagnosis Date    Acute hypoxemic respiratory failure 2018    Anemia     Arthritis     back, hand, knees    Back pain     Cataract     CKD stage G4/A3, GFR 15 - 29 and albumin creatinine ratio >300 mg/g     GFR 40% 2014 and 33% ub 3/2014 ([facility])    Coronary artery disease involving native coronary artery of native heart 2018    Diabetic retinopathy     DM (diabetes mellitus) type II controlled, neurological manifestation     Exudative age-related macular degeneration of left eye with active choroidal neovascularization 2019    GERD (gastroesophageal reflux disease)     Glaucoma     Heart failure     Hyperlipidemia     Hypertension     Non-ST elevation MI (NSTEMI) 2018    NSTEMI (non-ST elevated myocardial infarction) 2018    Peripheral neuropathy     Polyneuropathy     Proteinuria     >6 mo     Seizures     [facility] 2014 -dick CT NRI showed small vessel    Stroke     Tobacco dependence     Type 2 diabetes with peripheral circulatory disorder, controlled        Family History   Problem Relation Age of Onset    Diabetes Mother     Hyperlipidemia Mother     Hypertension Mother     Heart disease Mother         MI    Muscular dystrophy Son     Cancer Maternal Grandmother         colon       Social History     Socioeconomic History    Marital status:    Tobacco Use    Smoking status: Former Smoker     Packs/day: 1.50     Years: 30.00     Pack years: 45.00     Types: Cigarettes     Quit date: 1990     Years since quittin.5    Smokeless tobacco: Former User   Substance and Sexual  Activity    Alcohol use: No     Alcohol/week: 0.0 standard drinks    Drug use: No    Sexual activity: Not Currently       Past Surgical History:   Procedure Laterality Date    BREAST LUMPECTOMY Left     benign, when patient was 15 years old    BREAST MASS EXCISION      ,benign, age 15.    CATHETERIZATION OF BOTH LEFT AND RIGHT HEART N/A 11/28/2018    Procedure: CATHETERIZATION, HEART, BOTH LEFT AND RIGHT;  Surgeon: Michelle Holman MD;  Location: [facility] CATH LAB;  Service: Cardiology;  Laterality: N/A;    CATHETERIZATION OF BOTH LEFT AND RIGHT HEART N/A 11/30/2018    Procedure: CATHETERIZATION, HEART, BOTH LEFT AND RIGHT;  Surgeon: Michelle Holman MD;  Location: [facility] CATH LAB;  Service: Cardiology;  Laterality: N/A;    CORONARY ARTERY BYPASS GRAFT (CABG) N/A 7/20/2022    Procedure: CORONARY ARTERY BYPASS GRAFT (CABG);  Surgeon: Sanju Locke MD;  Location: [facility] OR;  Service: Cardiothoracic;  Laterality: N/A;  2-VESSEL PUMP ASSIST WITH EPI-AORTIC ULTRASOUND    ENDOSCOPIC HARVEST OF VEIN Left 7/20/2022    Procedure: SURGICAL PROCUREMENT, VEIN, ENDOSCOPIC;  Surgeon: Sanju Locke MD;  Location: [facility] OR;  Service: Cardiothoracic;  Laterality: Left;    HYSTERECTOMY  1982    INJECTION OF ANESTHETIC AGENT AROUND MULTIPLE INTERCOSTAL NERVES N/A 7/20/2022    Procedure: BLOCK, NERVE, INTERCOSTAL, 2 OR MORE;  Surgeon: Sanju Locke MD;  Location: [facility] OR;  Service: Cardiothoracic;  Laterality: N/A;  PARASTERNAL NERVE BLOCK    INSERTION OF SHUNT      LEFT HEART CATHETERIZATION Left 12/3/2018    Procedure: CATHETERIZATION, HEART, LEFT;  Surgeon: Michelle Holman MD;  Location: [facility] CATH LAB;  Service: Cardiology;  Laterality: Left;  LAD ATHERECTOMY STENT/ FEMORAL APPROACH    LEFT HEART CATHETERIZATION Left 7/13/2022    Procedure: CATHETERIZATION, HEART, LEFT;  Surgeon: Abhi Sloan MD;  Location: [facility] CATH LAB;  Service: Cardiology;  Laterality: Left;    OOPHORECTOMY      wrist cyst Right 1980s     "dorsal wrist cyst       Review of Systems   Unable to perform ROS: Acuity of condition   Constitutional: Negative for activity change and appetite change.   HENT: Negative for dental problem, nosebleeds and sore throat.    Eyes: Negative for discharge and visual disturbance.   Respiratory: Negative for cough, chest tightness and stridor.    Cardiovascular: Negative for leg swelling.   Gastrointestinal: Negative for abdominal distention and abdominal pain.   Genitourinary: Negative for difficulty urinating and dysuria.   Musculoskeletal: Negative for arthralgias, back pain and joint swelling.   Allergic/Immunologic: Negative for environmental allergies.   Neurological: Negative for dizziness, syncope and headaches.   Hematological: Does not bruise/bleed easily.   Psychiatric/Behavioral: Negative for behavioral problems.          Objective:   BP (!) 156/70   Pulse 80   Temp 98.4 °F (36.9 °C) (Oral)   Resp (!) 30   Ht 5' 5" (1.651 m)   Wt 64 kg (141 lb 1.5 oz)   LMP  (LMP Unknown)   SpO2 95%   Breastfeeding No   BMI 23.48 kg/m²     X-Ray Chest AP Portable  Narrative: EXAMINATION:  XR CHEST AP PORTABLE    CLINICAL HISTORY:  Post-op; Atherosclerotic heart disease of native coronary artery without angina pectoris    TECHNIQUE:  Single frontal view of the chest was performed.    COMPARISON:  07/21/2022    FINDINGS:  Tubes and lines are stable.   In comparison to the prior study, there is no adverse interval changes.  Impression: In comparison to the prior study, there is no adverse interval changes    Electronically signed by: Sebastien Trent MD  Date:    07/22/2022  Time:    06:55         Physical Exam  Vitals and nursing note reviewed.   Constitutional:       Comments: Sitting up in a chair   HENT:      Head: Normocephalic and atraumatic.      Nose: Congestion present.      Mouth/Throat:      Mouth: Mucous membranes are moist.   Eyes:      Extraocular Movements: Extraocular movements intact.      Pupils: Pupils " are equal, round, and reactive to light.   Cardiovascular:      Rate and Rhythm: Normal rate and regular rhythm.      Pulses: Normal pulses.      Heart sounds:     Friction rub present.   Pulmonary:      Effort: Pulmonary effort is normal.      Breath sounds: Normal breath sounds.   Abdominal:      General: Abdomen is flat.      Palpations: Abdomen is soft.   Musculoskeletal:      Cervical back: Normal range of motion and neck supple.      Right lower leg: Edema present.      Left lower leg: Edema present.   Skin:     General: Skin is warm.      Capillary Refill: Capillary refill takes less than 2 seconds.   Neurological:      General: No focal deficit present.      Mental Status: She is alert and oriented to person, place, and time. Mental status is at baseline.   Psychiatric:         Mood and Affect: Mood normal.     Chest x-ray shows postsurgical changes with small left-sided effusion and bibasilar atelectasis    Assessment:     1. Coronary artery disease involving native heart, unspecified vessel or lesion type, unspecified whether angina present    2. Chest pain    3. NSTEMI (non-ST elevated myocardial infarction)    4. Diabetic ketoacidosis without coma associated with other specified diabetes mellitus    5. Hyperkalemia    6. ESRD (end stage renal disease)    7. Elevated troponin I level    8. Congestive heart failure, unspecified HF chronicity, unspecified heart failure type    9. Elevated troponin    10. Post-operative state    11. S/P CABG (coronary artery bypass graft)    12. CAD (coronary artery disease)    13. DKA (diabetic ketoacidosis)    14. Hypertension associated with diabetes    15. Metabolic acidosis    16. On mechanically assisted ventilation          Plan   CABG x2 postop day 4 severe pulmonary hypertension end-stage renal disease on dialysis  Aggressive pulmonary toilet  Aspirin  Hyperlipidemia she is allergic to statins  Hypertension beta-blockers  Anemia acute blood loss postoperative  continue to monitor  Coagulopathy resolved  Diabetes mellitus on insulin sliding scale and long-acting on a diabetic diet  DVT and GI prophylaxis Pepcid and SCDs  Nutrition diabetic diet  End-stage renal disease hemodialysis per Renal  All chest tubes wires removed the  Can be taken out after obtaining a good IV  Transferred to the floor  PTOT  DC planning    I spent 35 minutes rounding on the patient with multi disciplinary team and patient care          Sanju Locke MD  [facility]  [place]

## 2022-07-25 ENCOUNTER — OUTPATIENT CASE MANAGEMENT (OUTPATIENT)
Dept: ADMINISTRATIVE | Facility: OTHER | Age: 77
End: 2022-07-25
Payer: MEDICARE

## 2022-07-25 PROBLEM — Z99.11 ON MECHANICALLY ASSISTED VENTILATION: Status: RESOLVED | Noted: 2022-07-20 | Resolved: 2022-07-25

## 2022-07-25 LAB
ALBUMIN SERPL BCP-MCNC: 2.7 G/DL (ref 3.5–5.2)
ALBUMIN SERPL BCP-MCNC: 2.9 G/DL (ref 3.5–5.2)
ALBUMIN SERPL BCP-MCNC: 2.9 G/DL (ref 3.5–5.2)
ANION GAP SERPL CALC-SCNC: 10 MMOL/L (ref 8–16)
ANION GAP SERPL CALC-SCNC: 10 MMOL/L (ref 8–16)
ANION GAP SERPL CALC-SCNC: 12 MMOL/L (ref 8–16)
ANION GAP SERPL CALC-SCNC: 12 MMOL/L (ref 8–16)
BASOPHILS # BLD AUTO: 0.03 K/UL (ref 0–0.2)
BASOPHILS NFR BLD: 0.3 % (ref 0–1.9)
BUN SERPL-MCNC: 21 MG/DL (ref 8–23)
BUN SERPL-MCNC: 29 MG/DL (ref 8–23)
BUN SERPL-MCNC: 36 MG/DL (ref 8–23)
BUN SERPL-MCNC: 36 MG/DL (ref 8–23)
CALCIUM SERPL-MCNC: 8.4 MG/DL (ref 8.7–10.5)
CALCIUM SERPL-MCNC: 8.5 MG/DL (ref 8.7–10.5)
CHLORIDE SERPL-SCNC: 100 MMOL/L (ref 95–110)
CHLORIDE SERPL-SCNC: 96 MMOL/L (ref 95–110)
CHLORIDE SERPL-SCNC: 97 MMOL/L (ref 95–110)
CHLORIDE SERPL-SCNC: 99 MMOL/L (ref 95–110)
CO2 SERPL-SCNC: 25 MMOL/L (ref 23–29)
CO2 SERPL-SCNC: 26 MMOL/L (ref 23–29)
CO2 SERPL-SCNC: 27 MMOL/L (ref 23–29)
CO2 SERPL-SCNC: 27 MMOL/L (ref 23–29)
CREAT SERPL-MCNC: 2.8 MG/DL (ref 0.5–1.4)
CREAT SERPL-MCNC: 3.5 MG/DL (ref 0.5–1.4)
CREAT SERPL-MCNC: 4.2 MG/DL (ref 0.5–1.4)
CREAT SERPL-MCNC: 4.2 MG/DL (ref 0.5–1.4)
DIFFERENTIAL METHOD: ABNORMAL
EOSINOPHIL # BLD AUTO: 0.1 K/UL (ref 0–0.5)
EOSINOPHIL NFR BLD: 0.9 % (ref 0–8)
ERYTHROCYTE [DISTWIDTH] IN BLOOD BY AUTOMATED COUNT: 15.7 % (ref 11.5–14.5)
EST. GFR  (AFRICAN AMERICAN): 11 ML/MIN/1.73 M^2
EST. GFR  (AFRICAN AMERICAN): 11 ML/MIN/1.73 M^2
EST. GFR  (AFRICAN AMERICAN): 14 ML/MIN/1.73 M^2
EST. GFR  (AFRICAN AMERICAN): 18 ML/MIN/1.73 M^2
EST. GFR  (NON AFRICAN AMERICAN): 10 ML/MIN/1.73 M^2
EST. GFR  (NON AFRICAN AMERICAN): 10 ML/MIN/1.73 M^2
EST. GFR  (NON AFRICAN AMERICAN): 12 ML/MIN/1.73 M^2
EST. GFR  (NON AFRICAN AMERICAN): 16 ML/MIN/1.73 M^2
GLUCOSE SERPL-MCNC: 124 MG/DL (ref 70–110)
GLUCOSE SERPL-MCNC: 125 MG/DL (ref 70–110)
GLUCOSE SERPL-MCNC: 172 MG/DL (ref 70–110)
GLUCOSE SERPL-MCNC: 191 MG/DL (ref 70–110)
HCT VFR BLD AUTO: 30.1 % (ref 37–48.5)
HGB BLD-MCNC: 10.1 G/DL (ref 12–16)
IMM GRANULOCYTES # BLD AUTO: 0.05 K/UL (ref 0–0.04)
IMM GRANULOCYTES NFR BLD AUTO: 0.5 % (ref 0–0.5)
LYMPHOCYTES # BLD AUTO: 0.9 K/UL (ref 1–4.8)
LYMPHOCYTES NFR BLD: 9 % (ref 18–48)
MAGNESIUM SERPL-MCNC: 2.4 MG/DL (ref 1.6–2.6)
MAGNESIUM SERPL-MCNC: 2.5 MG/DL (ref 1.6–2.6)
MAGNESIUM SERPL-MCNC: 3 MG/DL (ref 1.6–2.6)
MCH RBC QN AUTO: 30.4 PG (ref 27–31)
MCHC RBC AUTO-ENTMCNC: 33.6 G/DL (ref 32–36)
MCV RBC AUTO: 91 FL (ref 82–98)
MONOCYTES # BLD AUTO: 0.7 K/UL (ref 0.3–1)
MONOCYTES NFR BLD: 6.8 % (ref 4–15)
NEUTROPHILS # BLD AUTO: 8.4 K/UL (ref 1.8–7.7)
NEUTROPHILS NFR BLD: 82.5 % (ref 38–73)
NRBC BLD-RTO: 0 /100 WBC
PHOSPHATE SERPL-MCNC: 1.6 MG/DL (ref 2.7–4.5)
PHOSPHATE SERPL-MCNC: 1.6 MG/DL (ref 2.7–4.5)
PHOSPHATE SERPL-MCNC: 2 MG/DL (ref 2.7–4.5)
PHOSPHATE SERPL-MCNC: 2.1 MG/DL (ref 2.7–4.5)
PLATELET # BLD AUTO: 144 K/UL (ref 150–450)
PMV BLD AUTO: 9.3 FL (ref 9.2–12.9)
POCT GLUCOSE: 116 MG/DL (ref 70–110)
POCT GLUCOSE: 145 MG/DL (ref 70–110)
POCT GLUCOSE: 225 MG/DL (ref 70–110)
POTASSIUM SERPL-SCNC: 3.7 MMOL/L (ref 3.5–5.1)
POTASSIUM SERPL-SCNC: 3.8 MMOL/L (ref 3.5–5.1)
POTASSIUM SERPL-SCNC: 3.9 MMOL/L (ref 3.5–5.1)
POTASSIUM SERPL-SCNC: 4 MMOL/L (ref 3.5–5.1)
RBC # BLD AUTO: 3.32 M/UL (ref 4–5.4)
SODIUM SERPL-SCNC: 134 MMOL/L (ref 136–145)
SODIUM SERPL-SCNC: 135 MMOL/L (ref 136–145)
SODIUM SERPL-SCNC: 136 MMOL/L (ref 136–145)
SODIUM SERPL-SCNC: 136 MMOL/L (ref 136–145)
WBC # BLD AUTO: 10.13 K/UL (ref 3.9–12.7)

## 2022-07-25 PROCEDURE — 99232 SBSQ HOSP IP/OBS MODERATE 35: CPT | Mod: ,,, | Performed by: INTERNAL MEDICINE

## 2022-07-25 PROCEDURE — 84100 ASSAY OF PHOSPHORUS: CPT | Performed by: INTERNAL MEDICINE

## 2022-07-25 PROCEDURE — 99232 PR SUBSEQUENT HOSPITAL CARE,LEVL II: ICD-10-PCS | Mod: ,,, | Performed by: INTERNAL MEDICINE

## 2022-07-25 PROCEDURE — 25000003 PHARM REV CODE 250

## 2022-07-25 PROCEDURE — 85025 COMPLETE CBC W/AUTO DIFF WBC: CPT | Performed by: THORACIC SURGERY (CARDIOTHORACIC VASCULAR SURGERY)

## 2022-07-25 PROCEDURE — 94799 UNLISTED PULMONARY SVC/PX: CPT

## 2022-07-25 PROCEDURE — 83735 ASSAY OF MAGNESIUM: CPT | Performed by: THORACIC SURGERY (CARDIOTHORACIC VASCULAR SURGERY)

## 2022-07-25 PROCEDURE — 80069 RENAL FUNCTION PANEL: CPT | Mod: 91 | Performed by: INTERNAL MEDICINE

## 2022-07-25 PROCEDURE — 80100014 HC HEMODIALYSIS 1:1

## 2022-07-25 PROCEDURE — C9399 UNCLASSIFIED DRUGS OR BIOLOG: HCPCS

## 2022-07-25 PROCEDURE — 80048 BASIC METABOLIC PNL TOTAL CA: CPT | Performed by: THORACIC SURGERY (CARDIOTHORACIC VASCULAR SURGERY)

## 2022-07-25 PROCEDURE — 99900035 HC TECH TIME PER 15 MIN (STAT)

## 2022-07-25 PROCEDURE — 25000003 PHARM REV CODE 250: Performed by: THORACIC SURGERY (CARDIOTHORACIC VASCULAR SURGERY)

## 2022-07-25 PROCEDURE — 99233 PR SUBSEQUENT HOSPITAL CARE,LEVL III: ICD-10-PCS | Mod: ,,, | Performed by: INTERNAL MEDICINE

## 2022-07-25 PROCEDURE — 99233 SBSQ HOSP IP/OBS HIGH 50: CPT | Mod: ,,, | Performed by: INTERNAL MEDICINE

## 2022-07-25 PROCEDURE — 36415 COLL VENOUS BLD VENIPUNCTURE: CPT | Performed by: INTERNAL MEDICINE

## 2022-07-25 PROCEDURE — 25000003 PHARM REV CODE 250: Performed by: INTERNAL MEDICINE

## 2022-07-25 PROCEDURE — 83735 ASSAY OF MAGNESIUM: CPT | Mod: 91 | Performed by: INTERNAL MEDICINE

## 2022-07-25 PROCEDURE — 94761 N-INVAS EAR/PLS OXIMETRY MLT: CPT

## 2022-07-25 PROCEDURE — 21400001 HC TELEMETRY ROOM

## 2022-07-25 RX ORDER — ACETAMINOPHEN 325 MG/1
325 TABLET ORAL EVERY 4 HOURS PRN
Status: DISCONTINUED | OUTPATIENT
Start: 2022-07-25 | End: 2022-07-27 | Stop reason: HOSPADM

## 2022-07-25 RX ORDER — CHLORHEXIDINE GLUCONATE ORAL RINSE 1.2 MG/ML
10 SOLUTION DENTAL 2 TIMES DAILY
Status: DISCONTINUED | OUTPATIENT
Start: 2022-07-25 | End: 2022-07-27 | Stop reason: HOSPADM

## 2022-07-25 RX ORDER — SODIUM,POTASSIUM PHOSPHATES 280-250MG
1 POWDER IN PACKET (EA) ORAL ONCE
Status: COMPLETED | OUTPATIENT
Start: 2022-07-25 | End: 2022-07-25

## 2022-07-25 RX ADMIN — CYANOCOBALAMIN TAB 1000 MCG 1000 MCG: 1000 TAB at 08:07

## 2022-07-25 RX ADMIN — INSULIN DETEMIR 5 UNITS: 100 INJECTION, SOLUTION SUBCUTANEOUS at 08:07

## 2022-07-25 RX ADMIN — FAMOTIDINE 20 MG: 20 TABLET ORAL at 08:07

## 2022-07-25 RX ADMIN — TRAMADOL HYDROCHLORIDE 50 MG: 50 TABLET ORAL at 01:07

## 2022-07-25 RX ADMIN — CHLORHEXIDINE GLUCONATE 0.12% ORAL RINSE 10 ML: 1.2 LIQUID ORAL at 08:07

## 2022-07-25 RX ADMIN — METOPROLOL TARTRATE 25 MG: 25 TABLET, FILM COATED ORAL at 08:07

## 2022-07-25 RX ADMIN — MUPIROCIN: 20 OINTMENT TOPICAL at 08:07

## 2022-07-25 RX ADMIN — Medication 1 PACKET: at 11:07

## 2022-07-25 RX ADMIN — CLOPIDOGREL 75 MG: 75 TABLET, FILM COATED ORAL at 08:07

## 2022-07-25 RX ADMIN — INSULIN ASPART 2 UNITS: 100 INJECTION, SOLUTION INTRAVENOUS; SUBCUTANEOUS at 08:07

## 2022-07-25 RX ADMIN — OXYCODONE HYDROCHLORIDE AND ACETAMINOPHEN 500 MG: 500 TABLET ORAL at 08:07

## 2022-07-25 RX ADMIN — NEPHROCAP 1 CAPSULE: 1 CAP ORAL at 08:07

## 2022-07-25 RX ADMIN — ASPIRIN 81 MG CHEWABLE TABLET 81 MG: 81 TABLET CHEWABLE at 08:07

## 2022-07-25 RX ADMIN — FOLIC ACID 1 MG: 1 TABLET ORAL at 08:07

## 2022-07-25 NOTE — RESPIRATORY THERAPY
7?25/22- Home O2 eval attempted at 0916, Patient was receiving dialysis. Eval attempted again at 1415, Patient was asleep. Eval attempted at 1700, Patient was eating supper.     Will attempt eval at 1730 once Patient is done eating.

## 2022-07-25 NOTE — ASSESSMENT & PLAN NOTE
- per nephrology mgmt  - CRRT remains on at  goal net of zero  - monitor BMP  7/23 off CRRT.  Conventional HD as per Nephrology.  7/24 HD as per Nephrology.  7/25 on HD today.  Outpatient follow-up with Nephrology

## 2022-07-25 NOTE — PROGRESS NOTES
[facility] ([facility])  Cardiology  Progress Note    Patient Name: Avril Monzon  MRN: [id number]  Admission Date: 7/12/2022  Hospital Length of Stay: 13 days  Code Status: Full Code   Attending Physician: Sanju Locke MD   Primary Care Physician: Rose Parks MD  Expected Discharge Date:   Principal Problem:S/P CABG (coronary artery bypass graft)    Subjective:   HPI:  77 y.o. female patient with a PMHx of CKD, CAD s/p cardiac stenting, DM2, heart failure, HLD, HTN, NSTEMI, seizure, tobacco dependence, and CVA presents with NSTEMI and CHF.  EKG NSR, ST depression multiple leads. Initial troponin is 12. Pt being dialyzed today.     Hospital Course:   7/14/2022--Patient seen and examined in room, lying in bed. HD in progress. Denies any cardiac complaints today. Awaiting CABG on Monday.     7/15/2022--patient seen and examined in room, denies any complaints on exam today.     7/17/22 No c/o on HD on exam. Patient denies CP, angina or anginal equivalent. CABG tomorrow. Start statin after cabg, continue b blockers    7/18/2022 stable no chest pain . Cabg on hold due equipment need for off pump bypass    7/21/22-Patient seen and examined today, s/p CABG x 2. Increased OP from chest tubes overnight, H/H dipped to 4.3/12.0, being transfused. On pressor support, undergoing CRRT.     7/22/22-Patient seen and examined today, recovering s/p CABG x 2. Extubated, remains weak, complains of nausea and incisional pain. Pressors being weaned. Labs reviewed. H/H 8.9/25.2. Platelets 109,000.    7/23/2022 UP IN CHAIR TODAY NO PAIN NEEDING HEMODIALYSIS. STILL HAS DRAINAGE FROM CHEST TUBE NON FROM MEDIASTINAL TUBE. SHE IS PAIN FREE ALERT ORIENTED. NO ARRYTHMIAS.     7/24/2022 TOLERATED DIALYSIS WELL FEELS WELL NO CHEST PAIN NO ARRYTHMIAS CHEST TUIBE REMOVED.    7/25/22-Patient seen and examined today, sitting up in bed during dialysis. Stable. No CV complaints. Denies chest pain/SOB. Labs stable.         Review of  Systems   Constitutional: Positive for malaise/fatigue.   HENT: Negative.     Eyes: Negative.    Cardiovascular: Negative.    Respiratory: Negative.     Endocrine: Negative.    Hematologic/Lymphatic: Negative.    Skin: Negative.    Musculoskeletal: Negative.    Gastrointestinal: Negative.    Genitourinary: Negative.    Neurological: Negative.    Psychiatric/Behavioral: Negative.     Allergic/Immunologic: Negative.    Objective:     Vital Signs (Most Recent):  Temp: 98 °F (36.7 °C) (07/25/22 0000)  Pulse: 72 (07/25/22 0500)  Resp: 20 (07/25/22 0500)  BP: 139/65 (07/25/22 0500)  SpO2: 99 % (07/25/22 0917) Vital Signs (24h Range):  Temp:  [97.8 °F (36.6 °C)-98 °F (36.7 °C)] 98 °F (36.7 °C)  Pulse:  [71-85] 72  Resp:  [16-31] 20  SpO2:  [94 %-100 %] 99 %  BP: (126-183)/(60-81) 139/65     Weight: 64 kg (141 lb 1.5 oz)  Body mass index is 23.48 kg/m².     SpO2: 99 %  O2 Device (Oxygen Therapy): room air    No intake or output data in the 24 hours ending 07/25/22 1148    Lines/Drains/Airways       Drain  Duration                  Hemodialysis AV Fistula 04/01/20 2203 Left upper arm 844 days              Peripheral Intravenous Line  Duration                  Peripheral IV - Single Lumen 07/18/22 0625 20 G Posterior;Right Wrist 7 days         Peripheral IV - Single Lumen 07/25/22 0615 20 G Right Antecubital <1 day                    Physical Exam  Vitals and nursing note reviewed.   Constitutional:       General: She is not in acute distress.     Appearance: She is well-developed. She is ill-appearing. She is not diaphoretic.   HENT:      Head: Normocephalic and atraumatic.   Eyes:      General:         Right eye: No discharge.         Left eye: No discharge.      Pupils: Pupils are equal, round, and reactive to light.   Neck:      Thyroid: No thyromegaly.      Vascular: No JVD.      Trachea: No tracheal deviation.   Cardiovascular:      Rate and Rhythm: Normal rate and regular rhythm.      Heart sounds: Normal heart sounds,  S1 normal and S2 normal. No murmur heard.     Comments: Sternotomy site C/D/I  Pulmonary:      Effort: Pulmonary effort is normal. No respiratory distress.      Breath sounds: Normal breath sounds. No wheezing or rales.   Abdominal:      General: There is no distension.      Palpations: Abdomen is soft.      Tenderness: There is no rebound.   Musculoskeletal:      Cervical back: Neck supple.      Right lower leg: No edema.      Left lower leg: No edema.   Skin:     General: Skin is warm and dry.      Findings: No erythema.   Neurological:      General: No focal deficit present.      Mental Status: She is alert and oriented to person, place, and time.   Psychiatric:         Mood and Affect: Mood normal.         Behavior: Behavior normal.         Thought Content: Thought content normal.       Significant Labs: CMP   Recent Labs   Lab 07/24/22  1408 07/24/22  1655 07/24/22  2157 07/25/22  0621   * 137 134* 136  134*   K 3.7 3.6 3.8 3.8  3.7    100 99 100  99   CO2 26 26 24 26  25   * 102 167* 125*  124*   BUN 28* 30* 34* 36*  36*   CREATININE 3.5* 3.6* 4.0* 4.2*  4.2*   CALCIUM 8.3* 8.7 8.6* 8.5*  8.4*   ALBUMIN 2.8*  --  2.9* 2.7*   ANIONGAP 8 11 11 10  10   ESTGFRAFRICA 14* 14* 12* 11*  11*   EGFRNONAA 12* 12* 10* 10*  10*   , CBC   Recent Labs   Lab 07/24/22  0428 07/25/22  0621   WBC 12.99* 10.13   HGB 9.8* 10.1*   HCT 30.5* 30.1*   * 144*   , Troponin No results for input(s): TROPONINI in the last 48 hours., and All pertinent lab results from the last 24 hours have been reviewed.    Significant Imaging: Echocardiogram: Transthoracic echo (TTE) complete (Cupid Only):   Results for orders placed or performed during the hospital encounter of 07/12/22   Echo   Result Value Ref Range    BSA 1.68 m2    IVC diameter 1.93 cm    Left Ventricular Outflow Tract Mean Velocity 0.344489886714052 cm/s    Left Ventricular Outflow Tract Mean Gradient 1.94 mmHg    LVIDd 4.43 3.5 - 6.0 cm    IVS  1.15 (A) 0.6 - 1.1 cm    Posterior Wall 0.96 0.6 - 1.1 cm    Ao root annulus 2.78 cm    LVIDs 3.44 2.1 - 4.0 cm    FS 22 28 - 44 %    STJ 2.42 cm    Ascending aorta 2.32 cm    LV mass 161.00 g    LA size 3.85 cm    Left Ventricle Relative Wall Thickness 0.43 cm    AV mean gradient 4 mmHg    AV valve area 1.56 cm2    AV Velocity Ratio 0.79     AV index (prosthetic) 0.78     MV valve area p 1/2 method 6.15 cm2    E/A ratio 1.31     E wave deceleration time 123.588471370110792 msec    IVRT 39.442489678580527 msec    LVOT diameter 1.60 cm    LVOT area 2.0 cm2    LVOT peak lio 1.00 m/s    LVOT peak VTI 19.70 cm    Ao peak lio 1.26 m/s    Ao VTI 25.4 cm    RVOT peak lio 0.62 m/s    RVOT peak VTI 13.3 cm    Mr max lio 6.14 m/s    LVOT stroke volume 39.59 cm3    AV peak gradient 6 mmHg    PV mean gradient 0.85 mmHg    MV Peak E Lio 1.11 m/s    TR Max Lio 4.26 m/s    MV stenosis pressure 1/2 time 35.001382162333500 ms    MV Peak A Lio 0.85 m/s    LV Systolic Volume 48.80 mL    LV Systolic Volume Index 29.0 mL/m2    LV Diastolic Volume 88.87 mL    LV Diastolic Volume Index 52.90 mL/m2    LV Mass Index 96 g/m2    RA Major Axis 3.88 cm    Left Atrium Minor Axis 3.59 cm    Left Atrium Major Axis 4.08 cm    Triscuspid Valve Regurgitation Peak Gradient 73 mmHg    EF 55 %    Narrative    · Concentric hypertrophy and normal systolic function.  · The estimated ejection fraction is 55%.  · Normal left ventricular diastolic function.  · Normal right ventricular size with normal right ventricular systolic   function.  · Moderate tricuspid regurgitation.  · There is pulmonary hypertension.      , EKG: Reviewed, and X-Ray: CXR: X-Ray Chest 1 View (CXR): No results found for this visit on 07/12/22. and X-Ray Chest PA and Lateral (CXR): No results found for this visit on 07/12/22.    Assessment and Plan:   Patient who presents with multivessel CAD, recovering s/p CABG. Continue current care and mgmt as per CTS.    * S/P CABG (coronary  artery bypass graft)  -Continue current post-op care and mgmt as per CTS  -H/H dipped to 4.3/12.0, being transfused  -Wean pressors as tolerated  -ASA, Plavix, BB, statin as condition permits    7/22/22  -Recovering post CABG x 2  -H/H stable this AM   -Resume ASA, Plavix as per CTS  -Continue BB, statin  -IS usage and ambulation when able    7/23/2022   PROGRESSING WELL   IS USE ONGOING WILL HOPE TO GET TUBES OUT    CONTINUE  BB ASA STATINS    7/24/2022 STABLE HEMODYNAMICALLY NO ARRYTHMIAS CONTINUE SAME.    7/25/22  -Continues to improve  -Continue OMT  -IS usage and ambulation    NSTEMI (non-ST elevated myocardial infarction)  Plan for CABG on Monday per CTS  Continue ASA BB  Chest pain free    7/18/2022 chest pain free awaiting cabg    7/21/22  -See plan under CABG    7/23/2022   S/P LIMA TO LAD AND SVG TO OM  RCA OCCLUDED NON REVASCULARIZABLE DUE TO POOR TARGET.    Elevated troponin  77 y.o. female patient with a PMHx of CKD, CAD s/p cardiac stenting, DM2, heart failure, HLD, HTN, NSTEMI, seizure, tobacco dependence, and CVA presents with NSTEMI and CHF.  EKG NSR, ST depression multiple leads. Initial troponin is 12. Pt being dialyzed today.    Continue IV heparin, cardiac cath once CHF stable, being dialyzed today    CAD (coronary artery disease)  Has sevre cad 3 vessel low ef for cabg    7/23/2022  S/P 2 VESSEL CABG    Pulmonary hypertension  ASYMPTOMATIC     ESRD (end stage renal disease)  -Mgmt per nephrology    7/23/2022  FOR DIALYSIS TODAY.    7/24/2022  TOLERATED DIALYSIS WELL.    Hypertension associated with diabetes  -Continue BB as BP permits        VTE Risk Mitigation (From admission, onward)         Ordered     IP VTE HIGH RISK PATIENT  Once         07/24/22 1032     Place sequential compression device  Until discontinued         07/24/22 1032     heparin (porcine) injection 4,000 Units  As needed (PRN)         07/20/22 1632     Place sequential compression device  Until discontinued         07/19/22  1700                Moriah Tran PA-C  Cardiology  [facility] - Tele[facility] ([facility])

## 2022-07-25 NOTE — PT/OT/SLP PROGRESS
Physical Therapy      Patient Name:  Avril Monzon   MRN:  [id number]    09:10 a.m.  Unable to see patient at this time due to dialysis. Will follow up during next scheduled PT session.

## 2022-07-25 NOTE — ASSESSMENT & PLAN NOTE
-Continue current post-op care and mgmt as per CTS  -H/H dipped to 4.3/12.0, being transfused  -Wean pressors as tolerated  -ASA, Plavix, BB, statin as condition permits    7/22/22  -Recovering post CABG x 2  -H/H stable this AM   -Resume ASA, Plavix as per CTS  -Continue BB, statin  -IS usage and ambulation when able    7/23/2022   PROGRESSING WELL   IS USE ONGOING WILL HOPE TO GET TUBES OUT    CONTINUE  BB ASA STATINS    7/24/2022 STABLE HEMODYNAMICALLY NO ARRYTHMIAS CONTINUE SAME.    7/25/22  -Continues to improve  -Continue OMT  -IS usage and ambulation

## 2022-07-25 NOTE — ASSESSMENT & PLAN NOTE
- see s/p CABG assessment & plan  7/23 status post CABG x2.  Aspirin beta-blocker  7/24 aspirin Plavix beta-blocker status post CABG x2  7/25 postop day 5. .  Aspirin beta-blocker Plavix

## 2022-07-25 NOTE — PROGRESS NOTES
7/25/22 - Mrs Mackenzie is scheduled for contact from this OPCM RN today, but noted on chart review she is currently inpatient at [facility]. Will monitor chart and attempt to contact if/when d/c'd home. Debbie Lopez RN

## 2022-07-25 NOTE — SUBJECTIVE & OBJECTIVE
Review of Systems   Constitutional: Positive for malaise/fatigue.   HENT: Negative.     Eyes: Negative.    Cardiovascular: Negative.    Respiratory: Negative.     Endocrine: Negative.    Hematologic/Lymphatic: Negative.    Skin: Negative.    Musculoskeletal: Negative.    Gastrointestinal: Negative.    Genitourinary: Negative.    Neurological: Negative.    Psychiatric/Behavioral: Negative.     Allergic/Immunologic: Negative.    Objective:     Vital Signs (Most Recent):  Temp: 98 °F (36.7 °C) (07/25/22 0000)  Pulse: 72 (07/25/22 0500)  Resp: 20 (07/25/22 0500)  BP: 139/65 (07/25/22 0500)  SpO2: 99 % (07/25/22 0917) Vital Signs (24h Range):  Temp:  [97.8 °F (36.6 °C)-98 °F (36.7 °C)] 98 °F (36.7 °C)  Pulse:  [71-85] 72  Resp:  [16-31] 20  SpO2:  [94 %-100 %] 99 %  BP: (126-183)/(60-81) 139/65     Weight: 64 kg (141 lb 1.5 oz)  Body mass index is 23.48 kg/m².     SpO2: 99 %  O2 Device (Oxygen Therapy): room air    No intake or output data in the 24 hours ending 07/25/22 1148    Lines/Drains/Airways       Drain  Duration                  Hemodialysis AV Fistula 04/01/20 2203 Left upper arm 844 days              Peripheral Intravenous Line  Duration                  Peripheral IV - Single Lumen 07/18/22 0625 20 G Posterior;Right Wrist 7 days         Peripheral IV - Single Lumen 07/25/22 0615 20 G Right Antecubital <1 day                    Physical Exam  Vitals and nursing note reviewed.   Constitutional:       General: She is not in acute distress.     Appearance: She is well-developed. She is ill-appearing. She is not diaphoretic.   HENT:      Head: Normocephalic and atraumatic.   Eyes:      General:         Right eye: No discharge.         Left eye: No discharge.      Pupils: Pupils are equal, round, and reactive to light.   Neck:      Thyroid: No thyromegaly.      Vascular: No JVD.      Trachea: No tracheal deviation.   Cardiovascular:      Rate and Rhythm: Normal rate and regular rhythm.      Heart sounds: Normal  heart sounds, S1 normal and S2 normal. No murmur heard.     Comments: Sternotomy site C/D/I  Pulmonary:      Effort: Pulmonary effort is normal. No respiratory distress.      Breath sounds: Normal breath sounds. No wheezing or rales.   Abdominal:      General: There is no distension.      Palpations: Abdomen is soft.      Tenderness: There is no rebound.   Musculoskeletal:      Cervical back: Neck supple.      Right lower leg: No edema.      Left lower leg: No edema.   Skin:     General: Skin is warm and dry.      Findings: No erythema.   Neurological:      General: No focal deficit present.      Mental Status: She is alert and oriented to person, place, and time.   Psychiatric:         Mood and Affect: Mood normal.         Behavior: Behavior normal.         Thought Content: Thought content normal.       Significant Labs: CMP   Recent Labs   Lab 07/24/22  1408 07/24/22  1655 07/24/22  2157 07/25/22  0621   * 137 134* 136  134*   K 3.7 3.6 3.8 3.8  3.7    100 99 100  99   CO2 26 26 24 26  25   * 102 167* 125*  124*   BUN 28* 30* 34* 36*  36*   CREATININE 3.5* 3.6* 4.0* 4.2*  4.2*   CALCIUM 8.3* 8.7 8.6* 8.5*  8.4*   ALBUMIN 2.8*  --  2.9* 2.7*   ANIONGAP 8 11 11 10  10   ESTGFRAFRICA 14* 14* 12* 11*  11*   EGFRNONAA 12* 12* 10* 10*  10*   , CBC   Recent Labs   Lab 07/24/22  0428 07/25/22  0621   WBC 12.99* 10.13   HGB 9.8* 10.1*   HCT 30.5* 30.1*   * 144*   , Troponin No results for input(s): TROPONINI in the last 48 hours., and All pertinent lab results from the last 24 hours have been reviewed.    Significant Imaging: Echocardiogram: Transthoracic echo (TTE) complete (Cupid Only):   Results for orders placed or performed during the hospital encounter of 07/12/22   Echo   Result Value Ref Range    BSA 1.68 m2    IVC diameter 1.93 cm    Left Ventricular Outflow Tract Mean Velocity 0.670901536347242 cm/s    Left Ventricular Outflow Tract Mean Gradient 1.94 mmHg    LVIDd 4.43 3.5 -  6.0 cm    IVS 1.15 (A) 0.6 - 1.1 cm    Posterior Wall 0.96 0.6 - 1.1 cm    Ao root annulus 2.78 cm    LVIDs 3.44 2.1 - 4.0 cm    FS 22 28 - 44 %    STJ 2.42 cm    Ascending aorta 2.32 cm    LV mass 161.00 g    LA size 3.85 cm    Left Ventricle Relative Wall Thickness 0.43 cm    AV mean gradient 4 mmHg    AV valve area 1.56 cm2    AV Velocity Ratio 0.79     AV index (prosthetic) 0.78     MV valve area p 1/2 method 6.15 cm2    E/A ratio 1.31     E wave deceleration time [id number] msec    IVRT [id number] msec    LVOT diameter 1.60 cm    LVOT area 2.0 cm2    LVOT peak lio 1.00 m/s    LVOT peak VTI 19.70 cm    Ao peak lio 1.26 m/s    Ao VTI 25.4 cm    RVOT peak lio 0.62 m/s    RVOT peak VTI 13.3 cm    Mr max lio 6.14 m/s    LVOT stroke volume 39.59 cm3    AV peak gradient 6 mmHg    PV mean gradient 0.85 mmHg    MV Peak E Lio 1.11 m/s    TR Max Lio 4.26 m/s    MV stenosis pressure 1/2 time [id number] ms    MV Peak A Lio 0.85 m/s    LV Systolic Volume 48.80 mL    LV Systolic Volume Index 29.0 mL/m2    LV Diastolic Volume 88.87 mL    LV Diastolic Volume Index 52.90 mL/m2    LV Mass Index 96 g/m2    RA Major Axis 3.88 cm    Left Atrium Minor Axis 3.59 cm    Left Atrium Major Axis 4.08 cm    Triscuspid Valve Regurgitation Peak Gradient 73 mmHg    EF 55 %    Narrative    · Concentric hypertrophy and normal systolic function.  · The estimated ejection fraction is 55%.  · Normal left ventricular diastolic function.  · Normal right ventricular size with normal right ventricular systolic   function.  · Moderate tricuspid regurgitation.  · There is pulmonary hypertension.      , EKG: Reviewed, and X-Ray: CXR: X-Ray Chest 1 View (CXR): No results found for this visit on 07/12/22. and X-Ray Chest PA and Lateral (CXR): No results found for this visit on 07/12/22.

## 2022-07-25 NOTE — SUBJECTIVE & OBJECTIVE
Interval History:   7/25 O2 sat 99% on room air.  Day 5 post CABG x2.  On hemodialysis.  Afebrile.  Complains of weakness and decreased activity and appetite.      Review of Systems   Constitutional:  Positive for malaise/fatigue. Negative for chills and fever.   HENT:  Negative for hearing loss and nosebleeds.    Eyes:  Negative for discharge.   Respiratory:  Positive for cough and shortness of breath.    Cardiovascular:  Positive for leg swelling. Negative for chest pain.   Gastrointestinal:  Negative for abdominal pain.   Genitourinary:  Negative for hematuria.   Musculoskeletal:  Negative for falls.   Skin:  Negative for itching.   Neurological:  Positive for weakness. Negative for speech change, seizures and loss of consciousness.   Endo/Heme/Allergies:  Negative for polydipsia. Does not bruise/bleed easily.   Psychiatric/Behavioral:  Negative for hallucinations.        Objective:     Vital Signs (Most Recent):  Temp: 98 °F (36.7 °C) (07/25/22 0000)  Pulse: 72 (07/25/22 0500)  Resp: 20 (07/25/22 1306)  BP: 139/65 (07/25/22 0500)  SpO2: 99 % (07/25/22 0917)   Vital Signs (24h Range):  Temp:  [97.8 °F (36.6 °C)-98 °F (36.7 °C)] 98 °F (36.7 °C)  Pulse:  [71-85] 72  Resp:  [16-30] 20  SpO2:  [94 %-100 %] 99 %  BP: (126-183)/(60-81) 139/65     Weight: 64 kg (141 lb 1.5 oz)  Body mass index is 23.48 kg/m².      Intake/Output Summary (Last 24 hours) at 7/25/2022 1312  Last data filed at 7/25/2022 1233  Gross per 24 hour   Intake --   Output 2000 ml   Net -2000 ml         Physical Exam  Vitals and nursing note reviewed.   Constitutional:       General: She is not in acute distress.     Appearance: She is well-developed. She is ill-appearing.   HENT:      Head: Normocephalic and atraumatic.      Nose: Nose normal.   Eyes:      Extraocular Movements: Extraocular movements intact.   Cardiovascular:      Rate and Rhythm: Normal rate and regular rhythm.   Pulmonary:      Effort: Pulmonary effort is normal.      Breath  sounds: No stridor.      Comments: Sternal wound dressing  Abdominal:      General: There is no distension.   Musculoskeletal:         General: Deformity present. No signs of injury.      Cervical back: Normal range of motion and neck supple.      Comments: Left upper AVF   Skin:     General: Skin is dry.   Neurological:      General: No focal deficit present.      Mental Status: She is alert and oriented to person, place, and time. Mental status is at baseline.   Psychiatric:         Behavior: Behavior normal.       Vents:  Vent Mode: (S) Spont (07/22/22 0800)  Ventilator Initiated: Yes (07/20/22 1409)  Set Rate: 0 BPM (07/22/22 0800)  Vt Set: 375 mL (07/22/22 0800)  Pressure Support: 10 cmH20 (07/22/22 0800)  PEEP/CPAP: 5 cmH20 (07/22/22 0800)  Oxygen Concentration (%): 30 (07/22/22 0900)  Peak Airway Pressure: 15 cmH2O (07/22/22 0800)  Plateau Pressure: 20 cmH20 (07/22/22 0800)  Total Ve: 8.84 mL (07/22/22 0800)  F/VT Ratio<105 (RSBI): (!) 92.74 (07/22/22 0800)    Lines/Drains/Airways       Drain  Duration                  Hemodialysis AV Fistula 04/01/20 2203 Left upper arm 844 days              Peripheral Intravenous Line  Duration                  Peripheral IV - Single Lumen 07/18/22 0625 20 G Posterior;Right Wrist 7 days         Peripheral IV - Single Lumen 07/25/22 0615 20 G Right Antecubital <1 day                    Significant Labs:    CBC/Anemia Profile:  Recent Labs   Lab 07/24/22  0428 07/25/22  0621   WBC 12.99* 10.13   HGB 9.8* 10.1*   HCT 30.5* 30.1*   * 144*   MCV 92 91   RDW 16.2* 15.7*          Chemistries:  Recent Labs   Lab 07/24/22  1408 07/24/22  1655 07/24/22  2157 07/25/22  0621   * 137 134* 136  134*   K 3.7 3.6 3.8 3.8  3.7    100 99 100  99   CO2 26 26 24 26  25   BUN 28* 30* 34* 36*  36*   CREATININE 3.5* 3.6* 4.0* 4.2*  4.2*   CALCIUM 8.3* 8.7 8.6* 8.5*  8.4*   ALBUMIN 2.8*  --  2.9* 2.7*   MG 2.8* 3.0* 2.9* 3.0*   PHOS 2.3*  --  2.1* 2.1*  2.0*         Latest Reference Range & Units 07/12/22 20:20 07/12/22 22:56 07/13/22 02:00   Troponin I 0.000 - 0.026 ng/mL >50.000 (H) >50.000 (H) >50.000 (H)   (H): Data is abnormally high   Latest Reference Range & Units 07/22/22 04:21   POC PH 7.35 - 7.45  7.476 (H)   POC PCO2 35 - 45 mmHg 31.0 (L)   POC PO2 80 - 100 mmHg 79 (L)   POC HCO3 24 - 28 mmol/L 22.9 (L)   POC SATURATED O2 95 - 100 % 97   POC BE -2 to 2 mmol/L -1   FiO2  30   Vt  375   PEEP  5   Sample  ARTERIAL   DelSys  Adult Vent   Allens Test  N/A   Site  Sofia/UAC   Mode  AC/PRVC   Rate  20     EKG 07/12/2022  Vent. Rate : 082 BPM     Atrial Rate : 082 BPM      P-R Int : 148 ms          QRS Dur : 084 ms       QT Int : 442 ms       P-R-T Axes : 070 030 -65 degrees      QTc Int : 516 ms     Normal sinus rhythm   LVH with repolarization abnormality ( Morgan product )   Prolonged QT   When compared with ECG of 12-JUL-2022 10:27,   ST less depressed in Inferior leads   T wave inversion now evident in Anterior leads       2D echo 07/12/2022    Concentric hypertrophy and normal systolic function.  The estimated ejection fraction is 55%.  Normal left ventricular diastolic function.  Normal right ventricular size with normal right ventricular systolic function.  Moderate tricuspid regurgitation.  There is pulmonary hypertension.              Significant Imaging:      Chest x-ray 07/22/2022    CLINICAL HISTORY:  Post-op; Atherosclerotic heart disease of native coronary artery without angina pectoris     TECHNIQUE:  Single frontal view of the chest was performed.     COMPARISON:  07/21/2022     FINDINGS:  Tubes and lines are stable.   In comparison to the prior study, there is no adverse interval changes

## 2022-07-25 NOTE — ASSESSMENT & PLAN NOTE
- per CTS mgmt  - Day 2: s/p CABG to    1. pediciled LIMA to LAD   2. Saphenous vein graft to obtuse marginal  - pressors to maintain MAP >65, wean as clinically tolerated  - CT drainage stable  - Cont to monitor CBC  - ASA, plavix, BB, statin as tolerated   7/23 status post CABG x2.  Aspirin beta-blocker.   7/24 Aspirin Plavix beta-blocker  7/25  drains removed.  Outpatient CT surgery follow-up

## 2022-07-25 NOTE — PROGRESS NOTES
Subjective:      Patient ID: Avril Monzon is a 77 y.o. female.    Chief Complaint: Chest Pain (Seen at urgent care yesterday; c/o sharp pain with shortness of breath; hx of MI three years ago)    HPI:  77-year-old female who was admitted with the NSTEMI had i left heart catheterization which revealed 3 vessel coronary artery disease.  Patient has previous PCI to her proximal LAD and she has the diabetic hypertension hyperlipidemia end-stage renal disease on dialysis.  She also had pulmonary hypertension with EF of 40% global hypokinesis he moderate mitral regurgitation preop.  She was chronically anemic and history of smoking in the past she has diabetic retinopathy.  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  The patient had multiple dialysis she was also dated for the Plavix washout before she was taken up for surgery.  The patient had CABG x2 on a beating heart pump assisted coronary artery bypass grafting in the postoperative.  The patient had pulmonary hypertension with right heart failure for which she was assisted with nitric oxide inhalation she came to the ICU and was started on her planned CRRT.  The patient remained coagulopathic and hypo thermic overnight for which she was treated with FFP cryo and also packed cells she is on epinephrine Primacor and intermittent vasopressin she is awake and follows simple commands and this morning her nitric oxide is being weaned off.  7/22/22 the patient nitric oxide has been weaned off she remained hemodynamically stable had AP is downto0 4and a Primacor is 0.2 she is awake following commands  07/23/2022 patient was extubated of nitric oxide sitting up in a chair hemodynamically stable off inotropes her CRRT was stopped yesterday plan is to get dialysis today .  The chest tube output has considerably slowed down serous  drainage.  07/24/2022 the patient was extubated yesterday and she got hemodialyzed she is tolerating regular diet she will control has been fair No pain issues good motivation  07/25/2022 the patient had a quiet night overnight no major issues she is sitting up in a chair eating breakfast this morning  Still in place will DC that this morning she has floor arteries to go to.  Review of patient's allergies indicates:   Allergen Reactions    Codeine Swelling    Darvon [propoxyphene] Swelling    Atorvastatin      Muscle twitching       Past Medical History:   Diagnosis Date    Acute hypoxemic respiratory failure 11/26/2018    Anemia     Arthritis     back, hand, knees    Back pain     Cataract     CKD stage G4/A3, GFR 15 - 29 and albumin creatinine ratio >300 mg/g     GFR 40% Jan 2014 and 33% ub 3/2014 ([facility])    Coronary artery disease involving native coronary artery of native heart 11/30/2018    Diabetic retinopathy     DM (diabetes mellitus) type II controlled, neurological manifestation     Exudative age-related macular degeneration of left eye with active choroidal neovascularization 2/5/2019    GERD (gastroesophageal reflux disease)     Glaucoma     Heart failure     Hyperlipidemia     Hypertension     Non-ST elevation MI (NSTEMI) 11/28/2018    NSTEMI (non-ST elevated myocardial infarction) 11/27/2018    Peripheral neuropathy     Polyneuropathy     Proteinuria     >6 mo     Seizures     [facility] 1/2014 -dick CT NRI showed small vessel    Stroke 2012,2014    Tobacco dependence     Type 2 diabetes with peripheral circulatory disorder, controlled        Family History   Problem Relation Age of Onset    Diabetes Mother     Hyperlipidemia Mother     Hypertension Mother     Heart disease Mother         MI    Muscular dystrophy Son     Cancer Maternal Grandmother         colon       Social History     Socioeconomic History    Marital status:    Tobacco Use    Smoking status: Former  Smoker     Packs/day: 1.50     Years: 30.00     Pack years: 45.00     Types: Cigarettes     Quit date: 1990     Years since quittin.5    Smokeless tobacco: Former User   Substance and Sexual Activity    Alcohol use: No     Alcohol/week: 0.0 standard drinks    Drug use: No    Sexual activity: Not Currently       Past Surgical History:   Procedure Laterality Date    BREAST LUMPECTOMY Left     benign, when patient was 15 years old    BREAST MASS EXCISION      ,benign, age 15.    CATHETERIZATION OF BOTH LEFT AND RIGHT HEART N/A 2018    Procedure: CATHETERIZATION, HEART, BOTH LEFT AND RIGHT;  Surgeon: Michelle Holman MD;  Location: [facility] CATH LAB;  Service: Cardiology;  Laterality: N/A;    CATHETERIZATION OF BOTH LEFT AND RIGHT HEART N/A 2018    Procedure: CATHETERIZATION, HEART, BOTH LEFT AND RIGHT;  Surgeon: Michelle Holman MD;  Location: [facility] CATH LAB;  Service: Cardiology;  Laterality: N/A;    CORONARY ARTERY BYPASS GRAFT (CABG) N/A 2022    Procedure: CORONARY ARTERY BYPASS GRAFT (CABG);  Surgeon: Sanju Locke MD;  Location: [facility] OR;  Service: Cardiothoracic;  Laterality: N/A;  2-VESSEL PUMP ASSIST WITH EPI-AORTIC ULTRASOUND    ENDOSCOPIC HARVEST OF VEIN Left 2022    Procedure: SURGICAL PROCUREMENT, VEIN, ENDOSCOPIC;  Surgeon: Sanju Locke MD;  Location: [facility];  Service: Cardiothoracic;  Laterality: Left;    HYSTERECTOMY  1982    INJECTION OF ANESTHETIC AGENT AROUND MULTIPLE INTERCOSTAL NERVES N/A 2022    Procedure: BLOCK, NERVE, INTERCOSTAL, 2 OR MORE;  Surgeon: Sanju Locke MD;  Location: [facility];  Service: Cardiothoracic;  Laterality: N/A;  PARASTERNAL NERVE BLOCK    INSERTION OF SHUNT      LEFT HEART CATHETERIZATION Left 12/3/2018    Procedure: CATHETERIZATION, HEART, LEFT;  Surgeon: Michelle Holman MD;  Location: [facility] CATH LAB;  Service: Cardiology;  Laterality: Left;  LAD ATHERECTOMY STENT/ FEMORAL APPROACH    LEFT HEART CATHETERIZATION Left  "7/13/2022    Procedure: CATHETERIZATION, HEART, LEFT;  Surgeon: Abhi Sloan MD;  Location[facility] CATH LAB;  Service: Cardiology;  Laterality: Left;    OOPHORECTOMY      wrist cyst Right 1980s    dorsal wrist cyst       Review of Systems   Unable to perform ROS: Acuity of condition   Constitutional: Negative for activity change and appetite change.   HENT: Negative for dental problem, nosebleeds and sore throat.    Eyes: Negative for discharge and visual disturbance.   Respiratory: Negative for cough, chest tightness and stridor.    Cardiovascular: Negative for leg swelling.   Gastrointestinal: Negative for abdominal distention and abdominal pain.   Genitourinary: Negative for difficulty urinating and dysuria.   Musculoskeletal: Negative for arthralgias, back pain and joint swelling.   Allergic/Immunologic: Negative for environmental allergies.   Neurological: Negative for dizziness, syncope and headaches.   Hematological: Does not bruise/bleed easily.   Psychiatric/Behavioral: Negative for behavioral problems.          Objective:   /65   Pulse 72   Temp 98 °F (36.7 °C)   Resp 20   Ht 5' 5" (1.651 m)   Wt 64 kg (141 lb 1.5 oz)   LMP  (LMP Unknown)   SpO2 100%   Breastfeeding No   BMI 23.48 kg/m²     X-Ray Chest AP Portable  Narrative: EXAMINATION:  XR CHEST AP PORTABLE    CLINICAL HISTORY:  Post-op; Atherosclerotic heart disease of native coronary artery without angina pectoris    TECHNIQUE:  Single frontal view of the chest was performed.    COMPARISON:  07/21/2022    FINDINGS:  Tubes and lines are stable.   In comparison to the prior study, there is no adverse interval changes.  Impression: In comparison to the prior study, there is no adverse interval changes    Electronically signed by: Sebastien Trent MD  Date:    07/22/2022  Time:    06:55         Physical Exam  Vitals and nursing note reviewed.   Constitutional:       Appearance: Normal appearance.      Comments: Sitting up in a chair "   HENT:      Head: Normocephalic and atraumatic.      Mouth/Throat:      Mouth: Mucous membranes are moist.   Eyes:      Extraocular Movements: Extraocular movements intact.      Pupils: Pupils are equal, round, and reactive to light.   Cardiovascular:      Rate and Rhythm: Normal rate and regular rhythm.      Pulses: Normal pulses.      Heart sounds:     Friction rub present.   Pulmonary:      Effort: Pulmonary effort is normal.      Breath sounds: Normal breath sounds.   Abdominal:      General: Abdomen is flat.      Palpations: Abdomen is soft.   Musculoskeletal:      Cervical back: Normal range of motion and neck supple.      Right lower leg: Edema present.      Left lower leg: Edema present.   Skin:     General: Skin is warm.      Capillary Refill: Capillary refill takes less than 2 seconds.   Neurological:      General: No focal deficit present.      Mental Status: She is alert and oriented to person, place, and time. Mental status is at baseline.   Psychiatric:         Mood and Affect: Mood normal.         Assessment:     1. Coronary artery disease involving native heart, unspecified vessel or lesion type, unspecified whether angina present    2. Chest pain    3. NSTEMI (non-ST elevated myocardial infarction)    4. Diabetic ketoacidosis without coma associated with other specified diabetes mellitus    5. Hyperkalemia    6. ESRD (end stage renal disease)    7. Elevated troponin I level    8. Congestive heart failure, unspecified HF chronicity, unspecified heart failure type    9. Elevated troponin    10. Post-operative state    11. S/P CABG (coronary artery bypass graft)    12. CAD (coronary artery disease)    13. DKA (diabetic ketoacidosis)    14. Hypertension associated with diabetes    15. Metabolic acidosis    16. On mechanically assisted ventilation          Plan   CABG x2 postop day 5 severe pulmonary hypertension end-stage renal disease on dialysis  Aggressive pulmonary toilet  Aspirin  Hyperlipidemia  she is allergic to statins  Hypertension beta-blockers  Anemia acute blood loss postoperative continue to monitor  Coagulopathy resolved  Diabetes mellitus on insulin sliding scale and long-acting on a diabetic diet  DVT and GI prophylaxis Pepcid and SCDs  Nutrition diabetic diet  End-stage renal disease hemodialysis per Renal  Transferred to the floor  PTOT  DC planning.    I spent 35 minutes rounding on the patient with multi disciplinary team and patient care          Sanju Locke MD  [facility]  [place]

## 2022-07-25 NOTE — PROGRESS NOTES
07/25/22 1233   Post-Hemodialysis Assessment   Rinseback Volume (mL) 250 mL   Blood Volume Processed (Liters) 72 L   Dialyzer Clearance Lightly streaked   Total UF (mL) 2000 mL   Patient Response to Treatment Pt tolerated tx well

## 2022-07-25 NOTE — PROGRESS NOTES
[facility]ensive Care ([facility])  Nephrology  Progress Note    Patient Name: Avril Monzon  MRN: [id number]  Admission Date: 7/12/2022  Hospital Length of Stay: 13 days  Attending Provider: Sanju Locke MD   Primary Care Physician: Rose Parks MD  Principal Problem:S/P CABG (coronary artery bypass graft)    Subjective:     HPI: Noted    No new subjective & objective note has been filed under this hospital service since the last note was generated.    *First visit with patient; 7/25/22: Denies any shortness of breath or chest pain.       Chest CTA BL  CV RRR without murmur  LUE AVF with good thrill/bruit  Neuro alert and oriented x 3  abd soft NT ND     Latest Reference Range & Units 07/25/22 06:21   Sodium 136 - 145 mmol/L  136 - 145 mmol/L 134 (L)  136   Potassium 3.5 - 5.1 mmol/L  3.5 - 5.1 mmol/L 3.7  3.8   Chloride 95 - 110 mmol/L  95 - 110 mmol/L 99  100   CO2 23 - 29 mmol/L  23 - 29 mmol/L 25  26   Anion Gap 8 - 16 mmol/L  8 - 16 mmol/L 10  10   BUN 8 - 23 mg/dL  8 - 23 mg/dL 36 (H)  36 (H)   Creatinine 0.5 - 1.4 mg/dL  0.5 - 1.4 mg/dL 4.2 (H)  4.2 (H)   eGFR if non African American >60 mL/min/1.73 m^2  >60 mL/min/1.73 m^2 10 !  10 !   eGFR if African American >60 mL/min/1.73 m^2  >60 mL/min/1.73 m^2 11 !  11 !   Glucose 70 - 110 mg/dL  70 - 110 mg/dL 124 (H)  125 (H)   Calcium 8.7 - 10.5 mg/dL  8.7 - 10.5 mg/dL 8.4 (L)  8.5 (L)   Phosphorus 2.7 - 4.5 mg/dL  2.7 - 4.5 mg/dL 2.1 (L)  2.0 (L)   (L): Data is abnormally low  (H): Data is abnormally high  !: Data is abnormal     Latest Reference Range & Units 07/25/22 06:21   WBC 3.90 - 12.70 K/uL 10.13   RBC 4.00 - 5.40 M/uL 3.32 (L)   Hemoglobin 12.0 - 16.0 g/dL 10.1 (L)   Hematocrit 37.0 - 48.5 % 30.1 (L)   MCV 82 - 98 fL 91   MCH 27.0 - 31.0 pg 30.4   MCHC 32.0 - 36.0 g/dL 33.6   RDW 11.5 - 14.5 % 15.7 (H)   Platelets 150 - 450 K/uL 144 (L)   MPV 9.2 - 12.9 fL 9.3   Gran % 38.0 - 73.0 % 82.5 (H)   Lymph % 18.0 - 48.0 % 9.0 (L)   Mono % 4.0 - 15.0 %  6.8   Eosinophil % 0.0 - 8.0 % 0.9   Basophil % 0.0 - 1.9 % 0.3   Immature Granulocytes 0.0 - 0.5 % 0.5   Gran # (ANC) 1.8 - 7.7 K/uL 8.4 (H)   Lymph # 1.0 - 4.8 K/uL 0.9 (L)   Mono # 0.3 - 1.0 K/uL 0.7   Eos # 0.0 - 0.5 K/uL 0.1   Baso # 0.00 - 0.20 K/uL 0.03   (L): Data is abnormally low  (H): Data is abnormally high        Assessment/Plan:     76 y/o female with ESRD on chronic HD presented with acute coronary syndrome:     ESRD (end stage renal disease)  ESRD pt on chronic HD q MWF at [facility]  On CRRT from CABG on 7/20/22 until 7/23/22  Good response to CRRT  Switched back to conventional HD  On HD, s/p HD yesterday, tolerated HD well  Next HD in am (proior schedule), orders placed in chart     K normal  Hyponatremia  Metabolic acidosis stable   HTN: BP controlled. Meds reviewed  Anemia: normocytic. On epogen      * Acute coronary events  NSTEMI  S/p CABG on 7/20/22  Hemodynamically stable, doing well clinically      Type 2 diabetes mellitus with hyperglycemia, with long-term current use of insulin  Reviewed the chart.        Plans and recommendations:  As discussed above  Total critical care time spent 40 minutes including time needed to review the records, the   patient evaluation, documentation, discussion with ICU and other providers,   more than 50% of the time was spent on coordination of care and counseling.       *For 7/25/22: Next HD session planned for today; likely will only tolerate 2 liters UF; not appearing volume overloaded; meds and labs reviewed; access stable; following closely with you; next session after today would be Wednesday. Phos replacement x 1 dosed.     Gordo Henriquez MD  Nephrology  [facility] ([facility])

## 2022-07-25 NOTE — PT/OT/SLP PROGRESS
Occupational Therapy      Patient Name:  Avril Monzon   MRN:  [id number]    Patient not seen today secondary to PT IN HD @ 0930 AND 1200.Will follow-up ON NEXT VISIT.  Steffanie Cerda OT  7/25/2022   0930

## 2022-07-25 NOTE — PROGRESS NOTES
Home Oxygen Evaluation - [facility] - Cardiopulmonary Department      Date Performed: 7/25/2022      1) Patient's Home O2 Sat on room air, while at rest: Room Air SpO2 At Rest: 97 %        If O2 sats on room air at rest are 88% or below, patient qualifies.  Document O2 liter flow needed in Step 2.  If O2 sats are 89% or above, complete Step 3.        2)  If patient is not ambulated and O2 sats are <88%, what is the O2 liter flow required to meet ordered saturation?      If O2 sats on room air while exercising remain 89% or above patient does not qualify, no further testing needed Document N/A in step 3. If O2 sats on room air while exercising are 88% or below, continue to Step 4.    3) Patient's O2 Sat on room air while exercising: Room Air SpO2 During Ambulation: 92 %        4) Patient's O2 Sat while exercising on O2:   at           (Must show improvement from #4 for patients to qualify)

## 2022-07-25 NOTE — PLAN OF CARE
Ongoing (interventions implemented as appropriate)  Pt AAO x4.  VSS  Pt able to make needs known.  Pt remained afebrile throughout this shift.   Pt ambulates in room, gait unsteady   Pt remained free of falls this shift.   Pt denies pain this shift.  Plan of care reviewed. Patient verbalizes understanding.   Pt moving/turing independent. Frequent weight shifting encouraged.  Patient sinus rhythm on monitor.   Bed low, side rails up x 2, wheels locked, call light in reach.   Hourly rounding completed.   Will continue to monitor.

## 2022-07-25 NOTE — PROGRESS NOTES
[facility] - Intensive Care ([facility])  Critical Care Medicine  Progress Note    Patient Name: Avril Monzon  MRN: [id number]  Admission Date: 7/12/2022  Hospital Length of Stay: 13 days  Code Status: Full Code  Attending Provider: Sanju Locke MD  Primary Care Provider: Rose Parks MD   Principal Problem: S/P CABG (coronary artery bypass graft)    Subjective:     HPI:  77W pmh ESRD, DM, CAD s/p stent, HFpEF (Gr2 DD), HLD, HTN, NSTEMI, seizure, tobacco dependence, and CVA presents with NSTEMI .  Her hospital course has been complicated by DKA.    In ED, EKG with multiple leads with ST depressions.  Initial Troponin approx 12.  She is undergoing HD for volume mgmt.   She is on the DKA protocol with insulin drip, fluids and serial electrolytes.      Hospital/ICU Course:  7/12: Aspirin.  Heparin drip for NSTEMI.  Insulin drip for DKA.  Undergoing HD.  7/20: Day 0: CABG x2. Arrives to ICU intubated. 2 PRBCs received intra-op. Currently on milrinone, epi, & nitric oxide @ 40ppm.   7/21: Day 1: CABG x 2. Remains intubated. Awakens to physical stimuli and follows commands. Increased output from chest tubes, cryo administered overnight. 1/3 PRBCs currently infusing. Rpt labs pending. Nitric @ 18ppm. Remains on Epi, vaso, milronone. Currently on CRRT   7/22: Day 2- s/p CABG x2. Nitric weaned yesterday afernoon. SBT failed- RR >45-50 while on spontaneous, able to tolerate for ~1 hr, will re-attempt today. Labs stable.    7/23 days 3 status post CABG x2.  Extubated yesterday.  On room air.  Had a bowel movement this morning.  Epinephrine Milrinone Precedex discontinued.  Chest tubes output 900 mL.   7/24 O2 sat 98% 1 liter/minute.  Day 4 status post CABG x2 status post HD yesterday 2 L removed.  Afebrile .  Positive 8.4 L since admission.  7/25 O2 sat 99% on room air.  Day 5 post CABG x2.  On hemodialysis.  Afebrile.  Complains of weakness and decreased activity and appetite.      Interval History:   7/25 O2 sat 99% on  room air.  Day 5 post CABG x2.  On hemodialysis.  Afebrile.  Complains of weakness and decreased activity and appetite.      Review of Systems   Constitutional:  Positive for malaise/fatigue. Negative for chills and fever.   HENT:  Negative for hearing loss and nosebleeds.    Eyes:  Negative for discharge.   Respiratory:  Positive for cough and shortness of breath.    Cardiovascular:  Positive for leg swelling. Negative for chest pain.   Gastrointestinal:  Negative for abdominal pain.   Genitourinary:  Negative for hematuria.   Musculoskeletal:  Negative for falls.   Skin:  Negative for itching.   Neurological:  Positive for weakness. Negative for speech change, seizures and loss of consciousness.   Endo/Heme/Allergies:  Negative for polydipsia. Does not bruise/bleed easily.   Psychiatric/Behavioral:  Negative for hallucinations.        Objective:     Vital Signs (Most Recent):  Temp: 98 °F (36.7 °C) (07/25/22 0000)  Pulse: 72 (07/25/22 0500)  Resp: 20 (07/25/22 1306)  BP: 139/65 (07/25/22 0500)  SpO2: 99 % (07/25/22 0917)   Vital Signs (24h Range):  Temp:  [97.8 °F (36.6 °C)-98 °F (36.7 °C)] 98 °F (36.7 °C)  Pulse:  [71-85] 72  Resp:  [16-30] 20  SpO2:  [94 %-100 %] 99 %  BP: (126-183)/(60-81) 139/65     Weight: 64 kg (141 lb 1.5 oz)  Body mass index is 23.48 kg/m².      Intake/Output Summary (Last 24 hours) at 7/25/2022 1312  Last data filed at 7/25/2022 1233  Gross per 24 hour   Intake --   Output 2000 ml   Net -2000 ml         Physical Exam  Vitals and nursing note reviewed.   Constitutional:       General: She is not in acute distress.     Appearance: She is well-developed. She is ill-appearing.   HENT:      Head: Normocephalic and atraumatic.      Nose: Nose normal.   Eyes:      Extraocular Movements: Extraocular movements intact.   Cardiovascular:      Rate and Rhythm: Normal rate and regular rhythm.   Pulmonary:      Effort: Pulmonary effort is normal.      Breath sounds: No stridor.      Comments: Sternal  wound dressing  Abdominal:      General: There is no distension.   Musculoskeletal:         General: Deformity present. No signs of injury.      Cervical back: Normal range of motion and neck supple.      Comments: Left upper AVF   Skin:     General: Skin is dry.   Neurological:      General: No focal deficit present.      Mental Status: She is alert and oriented to person, place, and time. Mental status is at baseline.   Psychiatric:         Behavior: Behavior normal.       Vents:  Vent Mode: (S) Spont (07/22/22 0800)  Ventilator Initiated: Yes (07/20/22 1409)  Set Rate: 0 BPM (07/22/22 0800)  Vt Set: 375 mL (07/22/22 0800)  Pressure Support: 10 cmH20 (07/22/22 0800)  PEEP/CPAP: 5 cmH20 (07/22/22 0800)  Oxygen Concentration (%): 30 (07/22/22 0900)  Peak Airway Pressure: 15 cmH2O (07/22/22 0800)  Plateau Pressure: 20 cmH20 (07/22/22 0800)  Total Ve: 8.84 mL (07/22/22 0800)  F/VT Ratio<105 (RSBI): (!) 92.74 (07/22/22 0800)    Lines/Drains/Airways       Drain  Duration                  Hemodialysis AV Fistula 04/01/20 2203 Left upper arm 844 days              Peripheral Intravenous Line  Duration                  Peripheral IV - Single Lumen 07/18/22 0625 20 G Posterior;Right Wrist 7 days         Peripheral IV - Single Lumen 07/25/22 0615 20 G Right Antecubital <1 day                    Significant Labs:    CBC/Anemia Profile:  Recent Labs   Lab 07/24/22  0428 07/25/22  0621   WBC 12.99* 10.13   HGB 9.8* 10.1*   HCT 30.5* 30.1*   * 144*   MCV 92 91   RDW 16.2* 15.7*          Chemistries:  Recent Labs   Lab 07/24/22  1408 07/24/22  1655 07/24/22  2157 07/25/22  0621   * 137 134* 136  134*   K 3.7 3.6 3.8 3.8  3.7    100 99 100  99   CO2 26 26 24 26  25   BUN 28* 30* 34* 36*  36*   CREATININE 3.5* 3.6* 4.0* 4.2*  4.2*   CALCIUM 8.3* 8.7 8.6* 8.5*  8.4*   ALBUMIN 2.8*  --  2.9* 2.7*   MG 2.8* 3.0* 2.9* 3.0*   PHOS 2.3*  --  2.1* 2.1*  2.0*        [facility] Reference Range & Units 07/12/22 20:20  07/12/22 22:56 07/13/22 02:00   Troponin I 0.000 - 0.026 ng/mL >50.000 (H) >50.000 (H) >50.000 (H)   (H): Data is abnormally high   Latest Reference Range & Units 07/22/22 04:21   POC PH 7.35 - 7.45  7.476 (H)   POC PCO2 35 - 45 mmHg 31.0 (L)   POC PO2 80 - 100 mmHg 79 (L)   POC HCO3 24 - 28 mmol/L 22.9 (L)   POC SATURATED O2 95 - 100 % 97   POC BE -2 to 2 mmol/L -1   FiO2  30   Vt  375   PEEP  5   Sample  ARTERIAL   DelSys  Adult Vent   Allens Test  N/A   Site  Sofia/UAC   Mode  AC/PRVC   Rate  20     EKG 07/12/2022  Vent. Rate : 082 BPM     Atrial Rate : 082 BPM      P-R Int : 148 ms          QRS Dur : 084 ms       QT Int : 442 ms       P-R-T Axes : 070 030 -65 degrees      QTc Int : 516 ms     Normal sinus rhythm   LVH with repolarization abnormality ( Morgan product )   Prolonged QT   When compared with ECG of 12-JUL-2022 10:27,   ST less depressed in Inferior leads   T wave inversion now evident in Anterior leads       2D echo 07/12/2022    · Concentric hypertrophy and normal systolic function.  · The estimated ejection fraction is 55%.  · Normal left ventricular diastolic function.  · Normal right ventricular size with normal right ventricular systolic function.  · Moderate tricuspid regurgitation.  · There is pulmonary hypertension.              Significant Imaging:      Chest x-ray 07/22/2022    CLINICAL HISTORY:  Post-op; Atherosclerotic heart disease of native coronary artery without angina pectoris     TECHNIQUE:  Single frontal view of the chest was performed.     COMPARISON:  07/21/2022     FINDINGS:  Tubes and lines are stable.   In comparison to the prior study, there is no adverse interval changes                  ABG  Recent Labs   Lab 07/22/22  0421   PH 7.476*   PO2 79*   PCO2 31.0*   HCO3 22.9*   BE -1     Assessment/Plan:     Pulmonary  Pulmonary hypertension  - hx of PA pressure 69.17 in 2018  - CTS managing  - PA pressure >70mmHg intraop  - nitric weaned yesterday with PA pressures ~45-50  -  CRRT at goal net zero    Cardiac/Vascular  * S/P CABG (coronary artery bypass graft)  - per CTS mgmt  - Day 2: s/p CABG to    1. pediciled LIMA to LAD   2. Saphenous vein graft to obtuse marginal  - pressors to maintain MAP >65, wean as clinically tolerated  - CT drainage stable  - Cont to monitor CBC  - ASA, plavix, BB, statin as tolerated   7/23 status post CABG x2.  Aspirin beta-blocker.   7/24 Aspirin Plavix beta-blocker  7/25  drains removed.  Outpatient CT surgery follow-up    NSTEMI (non-ST elevated myocardial infarction)  - see s/p CABG assessment & plan  7/23 status post CABG x2.  Aspirin beta-blocker  7/24 aspirin Plavix beta-blocker status post CABG x2  7/25 postop day 5. .  Aspirin beta-blocker Plavix    Elevated troponin  Due to NSTEMI  See assessment and plan for NSTEMI    CAD (coronary artery disease)  - CTS mgmt  - Day 1: s/p CABG   - 7/13 LHC:  · The pre-procedure left ventricular end diastolic pressure was 31.  · The post-procedure left ventricular end diastolic pressure was 32.  · The ejection fraction was calculated to be 40%.  · The Prox RCA to Mid RCA lesion was 100% stenosed.  · The Ost Cx to Prox Cx lesion was 99% stenosed.  · The Prox LAD to Mid LAD lesion was 75% stenosed.  · The estimated blood loss was none.  · There was three vessel coronary artery disease.  7/24 aspirin Plavix beta-blocker status post CABG.    Hypertension associated with diabetes  - per cards/CTS mgmt  - requiring pressor support for hemodynamic, suggest holding BB until more normotensive or SBP >90 and MAP >65 without pressors     Renal/  Metabolic acidosis  - BE improved, HCO3 22.9  - cont to monitor         ESRD (end stage renal disease)  - per nephrology mgmt  - CRRT remains on at  goal net of zero  - monitor BMP  7/23 off CRRT.  Conventional HD as per Nephrology.  7/24 HD as per Nephrology.  7/25 on HD today.  Outpatient follow-up with Nephrology       Hattie Stephens MD  Critical Care Medicine  [facility] -  Intensive Care ([facility])

## 2022-07-26 LAB
ALBUMIN SERPL BCP-MCNC: 2.8 G/DL (ref 3.5–5.2)
ALBUMIN SERPL BCP-MCNC: 3.1 G/DL (ref 3.5–5.2)
ALBUMIN SERPL BCP-MCNC: 3.2 G/DL (ref 3.5–5.2)
ANION GAP SERPL CALC-SCNC: 11 MMOL/L (ref 8–16)
ANION GAP SERPL CALC-SCNC: 12 MMOL/L (ref 8–16)
ANION GAP SERPL CALC-SCNC: 13 MMOL/L (ref 8–16)
ANION GAP SERPL CALC-SCNC: 13 MMOL/L (ref 8–16)
BASOPHILS # BLD AUTO: 0.03 K/UL (ref 0–0.2)
BASOPHILS NFR BLD: 0.3 % (ref 0–1.9)
BUN SERPL-MCNC: 33 MG/DL (ref 8–23)
BUN SERPL-MCNC: 33 MG/DL (ref 8–23)
BUN SERPL-MCNC: 35 MG/DL (ref 8–23)
BUN SERPL-MCNC: 40 MG/DL (ref 8–23)
CALCIUM SERPL-MCNC: 8.5 MG/DL (ref 8.7–10.5)
CALCIUM SERPL-MCNC: 8.7 MG/DL (ref 8.7–10.5)
CALCIUM SERPL-MCNC: 8.7 MG/DL (ref 8.7–10.5)
CALCIUM SERPL-MCNC: 8.9 MG/DL (ref 8.7–10.5)
CHLORIDE SERPL-SCNC: 95 MMOL/L (ref 95–110)
CHLORIDE SERPL-SCNC: 95 MMOL/L (ref 95–110)
CHLORIDE SERPL-SCNC: 97 MMOL/L (ref 95–110)
CHLORIDE SERPL-SCNC: 97 MMOL/L (ref 95–110)
CO2 SERPL-SCNC: 25 MMOL/L (ref 23–29)
CO2 SERPL-SCNC: 26 MMOL/L (ref 23–29)
CO2 SERPL-SCNC: 27 MMOL/L (ref 23–29)
CO2 SERPL-SCNC: 27 MMOL/L (ref 23–29)
CREAT SERPL-MCNC: 4.1 MG/DL (ref 0.5–1.4)
CREAT SERPL-MCNC: 4.1 MG/DL (ref 0.5–1.4)
CREAT SERPL-MCNC: 4.7 MG/DL (ref 0.5–1.4)
CREAT SERPL-MCNC: 4.9 MG/DL (ref 0.5–1.4)
DIFFERENTIAL METHOD: ABNORMAL
EOSINOPHIL # BLD AUTO: 0.1 K/UL (ref 0–0.5)
EOSINOPHIL NFR BLD: 1 % (ref 0–8)
ERYTHROCYTE [DISTWIDTH] IN BLOOD BY AUTOMATED COUNT: 15.5 % (ref 11.5–14.5)
EST. GFR  (AFRICAN AMERICAN): 10 ML/MIN/1.73 M^2
EST. GFR  (AFRICAN AMERICAN): 12 ML/MIN/1.73 M^2
EST. GFR  (AFRICAN AMERICAN): 12 ML/MIN/1.73 M^2
EST. GFR  (AFRICAN AMERICAN): 9 ML/MIN/1.73 M^2
EST. GFR  (NON AFRICAN AMERICAN): 10 ML/MIN/1.73 M^2
EST. GFR  (NON AFRICAN AMERICAN): 10 ML/MIN/1.73 M^2
EST. GFR  (NON AFRICAN AMERICAN): 8 ML/MIN/1.73 M^2
EST. GFR  (NON AFRICAN AMERICAN): 9 ML/MIN/1.73 M^2
GLUCOSE SERPL-MCNC: 146 MG/DL (ref 70–110)
GLUCOSE SERPL-MCNC: 147 MG/DL (ref 70–110)
GLUCOSE SERPL-MCNC: 150 MG/DL (ref 70–110)
GLUCOSE SERPL-MCNC: 65 MG/DL (ref 70–110)
HCT VFR BLD AUTO: 31.6 % (ref 37–48.5)
HGB BLD-MCNC: 10.6 G/DL (ref 12–16)
IMM GRANULOCYTES # BLD AUTO: 0.07 K/UL (ref 0–0.04)
IMM GRANULOCYTES NFR BLD AUTO: 0.8 % (ref 0–0.5)
LYMPHOCYTES # BLD AUTO: 0.8 K/UL (ref 1–4.8)
LYMPHOCYTES NFR BLD: 9 % (ref 18–48)
MAGNESIUM SERPL-MCNC: 2.6 MG/DL (ref 1.6–2.6)
MAGNESIUM SERPL-MCNC: 3 MG/DL (ref 1.6–2.6)
MAGNESIUM SERPL-MCNC: 3 MG/DL (ref 1.6–2.6)
MCH RBC QN AUTO: 30.3 PG (ref 27–31)
MCHC RBC AUTO-ENTMCNC: 33.5 G/DL (ref 32–36)
MCV RBC AUTO: 90 FL (ref 82–98)
MONOCYTES # BLD AUTO: 0.7 K/UL (ref 0.3–1)
MONOCYTES NFR BLD: 8.1 % (ref 4–15)
NEUTROPHILS # BLD AUTO: 7.3 K/UL (ref 1.8–7.7)
NEUTROPHILS NFR BLD: 80.8 % (ref 38–73)
NRBC BLD-RTO: 0 /100 WBC
PHOSPHATE SERPL-MCNC: 2.1 MG/DL (ref 2.7–4.5)
PHOSPHATE SERPL-MCNC: 2.2 MG/DL (ref 2.7–4.5)
PHOSPHATE SERPL-MCNC: 2.3 MG/DL (ref 2.7–4.5)
PLATELET # BLD AUTO: 159 K/UL (ref 150–450)
PMV BLD AUTO: 9.1 FL (ref 9.2–12.9)
POCT GLUCOSE: 224 MG/DL (ref 70–110)
POCT GLUCOSE: 89 MG/DL (ref 70–110)
POCT GLUCOSE: 94 MG/DL (ref 70–110)
POTASSIUM SERPL-SCNC: 3.7 MMOL/L (ref 3.5–5.1)
POTASSIUM SERPL-SCNC: 3.8 MMOL/L (ref 3.5–5.1)
POTASSIUM SERPL-SCNC: 4.1 MMOL/L (ref 3.5–5.1)
POTASSIUM SERPL-SCNC: 4.8 MMOL/L (ref 3.5–5.1)
RBC # BLD AUTO: 3.5 M/UL (ref 4–5.4)
SODIUM SERPL-SCNC: 133 MMOL/L (ref 136–145)
SODIUM SERPL-SCNC: 135 MMOL/L (ref 136–145)
WBC # BLD AUTO: 9.08 K/UL (ref 3.9–12.7)

## 2022-07-26 PROCEDURE — 94761 N-INVAS EAR/PLS OXIMETRY MLT: CPT

## 2022-07-26 PROCEDURE — 80048 BASIC METABOLIC PNL TOTAL CA: CPT | Performed by: THORACIC SURGERY (CARDIOTHORACIC VASCULAR SURGERY)

## 2022-07-26 PROCEDURE — 99233 SBSQ HOSP IP/OBS HIGH 50: CPT | Mod: ,,, | Performed by: INTERNAL MEDICINE

## 2022-07-26 PROCEDURE — 99232 PR SUBSEQUENT HOSPITAL CARE,LEVL II: ICD-10-PCS | Mod: ,,, | Performed by: INTERNAL MEDICINE

## 2022-07-26 PROCEDURE — 36415 COLL VENOUS BLD VENIPUNCTURE: CPT | Performed by: INTERNAL MEDICINE

## 2022-07-26 PROCEDURE — 99232 SBSQ HOSP IP/OBS MODERATE 35: CPT | Mod: ,,, | Performed by: INTERNAL MEDICINE

## 2022-07-26 PROCEDURE — 85025 COMPLETE CBC W/AUTO DIFF WBC: CPT | Performed by: THORACIC SURGERY (CARDIOTHORACIC VASCULAR SURGERY)

## 2022-07-26 PROCEDURE — 97530 THERAPEUTIC ACTIVITIES: CPT | Mod: CQ

## 2022-07-26 PROCEDURE — 99233 PR SUBSEQUENT HOSPITAL CARE,LEVL III: ICD-10-PCS | Mod: ,,, | Performed by: INTERNAL MEDICINE

## 2022-07-26 PROCEDURE — 97116 GAIT TRAINING THERAPY: CPT | Mod: CQ

## 2022-07-26 PROCEDURE — 94799 UNLISTED PULMONARY SVC/PX: CPT

## 2022-07-26 PROCEDURE — 25000003 PHARM REV CODE 250: Performed by: THORACIC SURGERY (CARDIOTHORACIC VASCULAR SURGERY)

## 2022-07-26 PROCEDURE — 99900035 HC TECH TIME PER 15 MIN (STAT)

## 2022-07-26 PROCEDURE — 83735 ASSAY OF MAGNESIUM: CPT | Mod: 91 | Performed by: INTERNAL MEDICINE

## 2022-07-26 PROCEDURE — 80069 RENAL FUNCTION PANEL: CPT | Mod: 91 | Performed by: INTERNAL MEDICINE

## 2022-07-26 PROCEDURE — 21400001 HC TELEMETRY ROOM

## 2022-07-26 PROCEDURE — 83735 ASSAY OF MAGNESIUM: CPT | Performed by: THORACIC SURGERY (CARDIOTHORACIC VASCULAR SURGERY)

## 2022-07-26 PROCEDURE — 63600175 PHARM REV CODE 636 W HCPCS

## 2022-07-26 RX ORDER — SODIUM CHLORIDE 9 MG/ML
INJECTION, SOLUTION INTRAVENOUS ONCE
Status: CANCELLED | OUTPATIENT
Start: 2022-07-26 | End: 2022-07-26

## 2022-07-26 RX ORDER — SODIUM CHLORIDE 9 MG/ML
INJECTION, SOLUTION INTRAVENOUS
Status: CANCELLED | OUTPATIENT
Start: 2022-07-26

## 2022-07-26 RX ADMIN — FOLIC ACID 1 MG: 1 TABLET ORAL at 08:07

## 2022-07-26 RX ADMIN — METOPROLOL TARTRATE 25 MG: 25 TABLET, FILM COATED ORAL at 08:07

## 2022-07-26 RX ADMIN — METOPROLOL TARTRATE 25 MG: 25 TABLET, FILM COATED ORAL at 09:07

## 2022-07-26 RX ADMIN — ASPIRIN 81 MG CHEWABLE TABLET 81 MG: 81 TABLET CHEWABLE at 08:07

## 2022-07-26 RX ADMIN — CYANOCOBALAMIN TAB 1000 MCG 1000 MCG: 1000 TAB at 08:07

## 2022-07-26 RX ADMIN — CHLORHEXIDINE GLUCONATE 0.12% ORAL RINSE 10 ML: 1.2 LIQUID ORAL at 09:07

## 2022-07-26 RX ADMIN — TRAMADOL HYDROCHLORIDE 50 MG: 50 TABLET ORAL at 04:07

## 2022-07-26 RX ADMIN — CLOPIDOGREL 75 MG: 75 TABLET, FILM COATED ORAL at 08:07

## 2022-07-26 RX ADMIN — FAMOTIDINE 20 MG: 20 TABLET ORAL at 08:07

## 2022-07-26 RX ADMIN — MAGNESIUM HYDROXIDE 2400 MG: 400 SUSPENSION ORAL at 06:07

## 2022-07-26 RX ADMIN — INSULIN DETEMIR 5 UNITS: 100 INJECTION, SOLUTION SUBCUTANEOUS at 08:07

## 2022-07-26 RX ADMIN — OXYCODONE HYDROCHLORIDE AND ACETAMINOPHEN 500 MG: 500 TABLET ORAL at 08:07

## 2022-07-26 RX ADMIN — NEPHROCAP 1 CAPSULE: 1 CAP ORAL at 08:07

## 2022-07-26 RX ADMIN — CHLORHEXIDINE GLUCONATE 0.12% ORAL RINSE 10 ML: 1.2 LIQUID ORAL at 08:07

## 2022-07-26 RX ADMIN — INSULIN ASPART 4 UNITS: 100 INJECTION, SOLUTION INTRAVENOUS; SUBCUTANEOUS at 01:07

## 2022-07-26 NOTE — PROGRESS NOTES
[facility] Tele[facility] ([facility])  Cardiology  Progress Note    Patient Name: Avril Monzon  MRN: [id number]  Admission Date: 7/12/2022  Hospital Length of Stay: 14 days  Code Status: Full Code   Attending Physician: Sanju Locke MD   Primary Care Physician: Rose Parks MD  Expected Discharge Date:   Principal Problem:S/P CABG (coronary artery bypass graft)    Subjective:   HPI:  77 y.o. female patient with a PMHx of CKD, CAD s/p cardiac stenting, DM2, heart failure, HLD, HTN, NSTEMI, seizure, tobacco dependence, and CVA presents with NSTEMI and CHF.  EKG NSR, ST depression multiple leads. Initial troponin is 12. Pt being dialyzed today.    Hospital Course:   7/14/2022--Patient seen and examined in room, lying in bed. HD in progress. Denies any cardiac complaints today. Awaiting CABG on Monday.     7/15/2022--patient seen and examined in room, denies any complaints on exam today.     7/17/22 No c/o on HD on exam. Patient denies CP, angina or anginal equivalent. CABG tomorrow. Start statin after cabg, continue b blockers    7/18/2022 stable no chest pain . Cabg on hold due equipment need for off pump bypass    7/21/22-Patient seen and examined today, s/p CABG x 2. Increased OP from chest tubes overnight, H/H dipped to 4.3/12.0, being transfused. On pressor support, undergoing CRRT.     7/22/22-Patient seen and examined today, recovering s/p CABG x 2. Extubated, remains weak, complains of nausea and incisional pain. Pressors being weaned. Labs reviewed. H/H 8.9/25.2. Platelets 109,000.    7/23/2022 UP IN CHAIR TODAY NO PAIN NEEDING HEMODIALYSIS. STILL HAS DRAINAGE FROM CHEST TUBE NON FROM MEDIASTINAL TUBE. SHE IS PAIN FREE ALERT ORIENTED. NO ARRYTHMIAS.     7/24/2022 TOLERATED DIALYSIS WELL FEELS WELL NO CHEST PAIN NO ARRYTHMIAS CHEST TUIBE REMOVED.    7/25/22-Patient seen and examined today, sitting up in bed during dialysis. Stable. No CV complaints. Denies chest pain/SOB. Labs stable.     7/26/22-Patient seen  and examined today, sitting up in bedside chair. Continues to progress. Worked with PT/OT earlier, ambulated well. No CV complaints. Labs stable. Likely d/c home tmw per CTS.          Review of Systems   Constitutional: Negative.   HENT: Negative.     Eyes: Negative.    Respiratory: Negative.     Objective:     Vital Signs (Most Recent):  Temp: 98.1 °F (36.7 °C) (07/26/22 1231)  Pulse: 73 (07/26/22 1231)  Resp: 18 (07/26/22 1231)  BP: (!) 116/58 (07/26/22 1231)  SpO2: 96 % (07/26/22 1231)   Vital Signs (24h Range):  Temp:  [98 °F (36.7 °C)-99.1 °F (37.3 °C)] 98.1 °F (36.7 °C)  Pulse:  [68-81] 73  Resp:  [18-20] 18  SpO2:  [91 %-98 %] 96 %  BP: (116-145)/(58-73) 116/58     Weight: 59.3 kg (130 lb 11.7 oz)  Body mass index is 21.76 kg/m².     SpO2: 96 %  O2 Device (Oxygen Therapy): room air    No intake or output data in the 24 hours ending 07/26/22 1310    Lines/Drains/Airways       Drain  Duration                  Hemodialysis AV Fistula 04/01/20 2203 Left upper arm 845 days              Peripheral Intravenous Line  Duration                  Peripheral IV - Single Lumen 07/18/22 0625 20 G Posterior;Right Wrist 8 days         Peripheral IV - Single Lumen 07/25/22 0615 20 G Right Antecubital 1 day                    Physical Exam  Vitals and nursing note reviewed.   Constitutional:       General: She is not in acute distress.     Appearance: Normal appearance. She is well-developed. She is not diaphoretic.   HENT:      Head: Normocephalic and atraumatic.   Eyes:      General:         Right eye: No discharge.         Left eye: No discharge.      Pupils: Pupils are equal, round, and reactive to light.   Neck:      Thyroid: No thyromegaly.      Vascular: No JVD.      Trachea: No tracheal deviation.   Cardiovascular:      Rate and Rhythm: Normal rate and regular rhythm.      Heart sounds: Normal heart sounds, S1 normal and S2 normal. No murmur heard.     Comments: Sternotomy site C/D/I; dressing in place  Pulmonary:       Effort: Pulmonary effort is normal. No respiratory distress.      Breath sounds: Normal breath sounds. No wheezing or rales.   Abdominal:      General: There is no distension.      Palpations: Abdomen is soft.      Tenderness: There is no rebound.   Musculoskeletal:      Cervical back: Neck supple.      Right lower leg: No edema.      Left lower leg: No edema.   Skin:     General: Skin is warm and dry.      Findings: No erythema.   Neurological:      General: No focal deficit present.      Mental Status: She is alert and oriented to person, place, and time.   Psychiatric:         Mood and Affect: Mood normal.         Behavior: Behavior normal.         Thought Content: Thought content normal.       Significant Labs: CMP   Recent Labs   Lab 07/25/22  1635 07/25/22  2213 07/26/22  0502   * 136 133*  135*   K 4.0 3.9 3.7  3.8   CL 96 97 97  97   CO2 27 27 25  26   * 172* 150*  146*   BUN 21 29* 33*  33*   CREATININE 2.8* 3.5* 4.1*  4.1*   CALCIUM 8.5* 8.5* 8.7  8.5*   ALBUMIN 2.9* 2.9* 2.8*   ANIONGAP 12 12 11  12   ESTGFRAFRICA 18* 14* 12*  12*   EGFRNONAA 16* 12* 10*  10*   , CBC   Recent Labs   Lab 07/25/22  0621 07/26/22  0502   WBC 10.13 9.08   HGB 10.1* 10.6*   HCT 30.1* 31.6*   * 159   , Troponin No results for input(s): TROPONINI in the last 48 hours., and All pertinent lab results from the last 24 hours have been reviewed.    Significant Imaging: Echocardiogram: Transthoracic echo (TTE) complete (Cupid Only):   Results for orders placed or performed during the hospital encounter of 07/12/22   Echo   Result Value Ref Range    BSA 1.68 m2    IVC diameter 1.93 cm    Left Ventricular Outflow Tract Mean Velocity 0.397339376541634 cm/s    Left Ventricular Outflow Tract Mean Gradient 1.94 mmHg    LVIDd 4.43 3.5 - 6.0 cm    IVS 1.15 (A) 0.6 - 1.1 cm    Posterior Wall 0.96 0.6 - 1.1 cm    Ao root annulus 2.78 cm    LVIDs 3.44 2.1 - 4.0 cm    FS 22 28 - 44 %    STJ 2.42 cm    Ascending  aorta 2.32 cm    LV mass 161.00 g    LA size 3.85 cm    Left Ventricle Relative Wall Thickness 0.43 cm    AV mean gradient 4 mmHg    AV valve area 1.56 cm2    AV Velocity Ratio 0.79     AV index (prosthetic) 0.78     MV valve area p 1/2 method 6.15 cm2    E/A ratio 1.31     E wave deceleration time 123.576100056488709 msec    IVRT 39.849599355437840 msec    LVOT diameter 1.60 cm    LVOT area 2.0 cm2    LVOT peak lio 1.00 m/s    LVOT peak VTI 19.70 cm    Ao peak lio 1.26 m/s    Ao VTI 25.4 cm    RVOT peak lio 0.62 m/s    RVOT peak VTI 13.3 cm    Mr max lio 6.14 m/s    LVOT stroke volume 39.59 cm3    AV peak gradient 6 mmHg    PV mean gradient 0.85 mmHg    MV Peak E Lio 1.11 m/s    TR Max Lio 4.26 m/s    MV stenosis pressure 1/2 time 35.056438517868869 ms    MV Peak A Lio 0.85 m/s    LV Systolic Volume 48.80 mL    LV Systolic Volume Index 29.0 mL/m2    LV Diastolic Volume 88.87 mL    LV Diastolic Volume Index 52.90 mL/m2    LV Mass Index 96 g/m2    RA Major Axis 3.88 cm    Left Atrium Minor Axis 3.59 cm    Left Atrium Major Axis 4.08 cm    Triscuspid Valve Regurgitation Peak Gradient 73 mmHg    EF 55 %    Narrative    · Concentric hypertrophy and normal systolic function.  · The estimated ejection fraction is 55%.  · Normal left ventricular diastolic function.  · Normal right ventricular size with normal right ventricular systolic   function.  · Moderate tricuspid regurgitation.  · There is pulmonary hypertension.      , EKG: Reviewed, and X-Ray: CXR: X-Ray Chest 1 View (CXR): No results found for this visit on 07/12/22. and X-Ray Chest PA and Lateral (CXR): No results found for this visit on 07/12/22.    Assessment and Plan:   Patient recovering s/p CABG. Progressing well. Continue OMT.     * S/P CABG (coronary artery bypass graft)  -Continue current post-op care and mgmt as per CTS  -H/H dipped to 4.3/12.0, being transfused  -Wean pressors as tolerated  -ASA, Plavix, BB, statin as condition  permits    7/22/22  -Recovering post CABG x 2  -H/H stable this AM   -Resume ASA, Plavix as per CTS  -Continue BB, statin  -IS usage and ambulation when able    7/23/2022   PROGRESSING WELL   IS USE ONGOING WILL HOPE TO GET TUBES OUT    CONTINUE  BB ASA STATINS    7/24/2022 STABLE HEMODYNAMICALLY NO ARRYTHMIAS CONTINUE SAME.    7/25/22  -Continues to improve  -Continue OMT  -IS usage and ambulation    7/26/22  -Progressing well  -Continue OMT  -IS usage and ambulation    NSTEMI (non-ST elevated myocardial infarction)  Plan for CABG on Monday per CTS  Continue ASA BB  Chest pain free    7/18/2022 chest pain free awaiting cabg    7/21/22  -See plan under CABG    7/23/2022   S/P LIMA TO LAD AND SVG TO OM  RCA OCCLUDED NON REVASCULARIZABLE DUE TO POOR TARGET.    Elevated troponin  77 y.o. female patient with a PMHx of CKD, CAD s/p cardiac stenting, DM2, heart failure, HLD, HTN, NSTEMI, seizure, tobacco dependence, and CVA presents with NSTEMI and CHF.  EKG NSR, ST depression multiple leads. Initial troponin is 12. Pt being dialyzed today.    Continue IV heparin, cardiac cath once CHF stable, being dialyzed today    CAD (coronary artery disease)  Has sevre cad 3 vessel low ef for cabg    7/23/2022  S/P 2 VESSEL CABG    Pulmonary hypertension  ASYMPTOMATIC     ESRD (end stage renal disease)  -Mgmt per nephrology    7/23/2022  FOR DIALYSIS TODAY.    7/24/2022  TOLERATED DIALYSIS WELL.    Hypertension associated with diabetes  -Continue BB as BP permits        VTE Risk Mitigation (From admission, onward)         Ordered     IP VTE HIGH RISK PATIENT  Once         07/24/22 1032     Place sequential compression device  Until discontinued         07/24/22 1032     heparin (porcine) injection 4,000 Units  As needed (PRN)         07/20/22 1632     Place sequential compression device  Until discontinued         07/19/22 1700                Moriah Tran PA-C  Cardiology  [facility] - Telemetry ([facility])

## 2022-07-26 NOTE — ASSESSMENT & PLAN NOTE
-Continue current post-op care and mgmt as per CTS  -H/H dipped to 4.3/12.0, being transfused  -Wean pressors as tolerated  -ASA, Plavix, BB, statin as condition permits    7/22/22  -Recovering post CABG x 2  -H/H stable this AM   -Resume ASA, Plavix as per CTS  -Continue BB, statin  -IS usage and ambulation when able    7/23/2022   PROGRESSING WELL   IS USE ONGOING WILL HOPE TO GET TUBES OUT    CONTINUE  BB ASA STATINS    7/24/2022 STABLE HEMODYNAMICALLY NO ARRYTHMIAS CONTINUE SAME.    7/25/22  -Continues to improve  -Continue OMT  -IS usage and ambulation    7/26/22  -Progressing well  -Continue OMT  -IS usage and ambulation

## 2022-07-26 NOTE — PLAN OF CARE
Morning labs H&H 10.6 and 31.6, Na 135, K 3.8, Mag 2.6, BUN 33, Creat 4.1, Phosp 2.2. Pt remains free of injury. All safety measures in place. Pt bed in the lowest position, call light within reach,bed locked,side rails up x2, and hourly rounding performed.      Problem: Adult Inpatient Plan of Care  Goal: Plan of Care Review  Outcome: Ongoing, Progressing  Goal: Patient-Specific Goal (Individualized)  Outcome: Ongoing, Progressing  Goal: Absence of Hospital-Acquired Illness or Injury  Outcome: Ongoing, Progressing  Goal: Optimal Comfort and Wellbeing  Outcome: Ongoing, Progressing  Goal: Readiness for Transition of Care  Outcome: Ongoing, Progressing     Problem: Device-Related Complication Risk (Hemodialysis)  Goal: Safe, Effective Therapy Delivery  Outcome: Ongoing, Progressing     Problem: Hemodynamic Instability (Hemodialysis)  Goal: Effective Tissue Perfusion  Outcome: Ongoing, Progressing     Problem: Infection (Hemodialysis)  Goal: Absence of Infection Signs and Symptoms  Outcome: Ongoing, Progressing     Problem: Diabetic Ketoacidosis  Goal: Fluid and Electrolyte Balance with Absence of Ketosis  Outcome: Ongoing, Progressing     Problem: Diabetes Comorbidity  Goal: Blood Glucose Level Within Targeted Range  Outcome: Ongoing, Progressing     Problem: Skin Injury Risk Increased  Goal: Skin Health and Integrity  Outcome: Ongoing, Progressing     Problem: Fall Injury Risk  Goal: Absence of Fall and Fall-Related Injury  Outcome: Ongoing, Progressing     Problem: Infection  Goal: Absence of Infection Signs and Symptoms  Outcome: Ongoing, Progressing     Problem: Device-Related Complication Risk (CRRT (Continuous Renal Replacement Therapy))  Goal: Safe, Effective Therapy Delivery  Outcome: Ongoing, Progressing     Problem: Hypothermia (CRRT (Continuous Renal Replacement Therapy))  Goal: Body Temperature Maintained in Desired Range  Outcome: Ongoing, Progressing     Problem: Infection (CRRT (Continuous Renal  Replacement Therapy))  Goal: Absence of Infection Signs and Symptoms  Outcome: Ongoing, Progressing     Problem: Communication Impairment (Mechanical Ventilation, Invasive)  Goal: Effective Communication  Outcome: Ongoing, Progressing     Problem: Device-Related Complication Risk (Mechanical Ventilation, Invasive)  Goal: Optimal Device Function  Outcome: Ongoing, Progressing     Problem: Inability to Wean (Mechanical Ventilation, Invasive)  Goal: Mechanical Ventilation Liberation  Outcome: Ongoing, Progressing     Problem: Nutrition Impairment (Mechanical Ventilation, Invasive)  Goal: Optimal Nutrition Delivery  Outcome: Ongoing, Progressing     Problem: Skin and Tissue Injury (Mechanical Ventilation, Invasive)  Goal: Absence of Device-Related Skin and Tissue Injury  Outcome: Ongoing, Progressing     Problem: Ventilator-Induced Lung Injury (Mechanical Ventilation, Invasive)  Goal: Absence of Ventilator-Induced Lung Injury  Outcome: Ongoing, Progressing     Problem: Communication Impairment (Artificial Airway)  Goal: Effective Communication  Outcome: Ongoing, Progressing     Problem: Device-Related Complication Risk (Artificial Airway)  Goal: Optimal Device Function  Outcome: Ongoing, Progressing     Problem: Skin and Tissue Injury (Artificial Airway)  Goal: Absence of Device-Related Skin or Tissue Injury  Outcome: Ongoing, Progressing     Problem: Restraint, Nonbehavioral (Nonviolent)  Goal: Absence of Harm or Injury  Outcome: Ongoing, Progressing

## 2022-07-26 NOTE — PLAN OF CARE
Plan of Care:  Nutrition Interventions/Recommendation (treatment strategy):  1.  To continue with adequate oral intake  2.  To consume oral supplement daily   3.  Collaboration with Medical Providers    Mariah Sauer MS, RDN, LDN  (RD remote)

## 2022-07-26 NOTE — PROGRESS NOTES
[facility] - Surgery ([facility])  Adult Nutrition  Progress Note    SUMMARY       Recommendations    Recommendation/Intervention:   1. To continue on oral diet     2. To recommended a cardiac, renal diet  3. To consume oral supplement BID    Goals: Patient to tolerate cardiac, renal oral diet.    Nutrition Goal Status: progressing     Communication of RD Recs: other (comment) (plan of care; sticky notes)      Assessment and Plan    Nutrition Problem  Increased nutrient needs     Related to (etiology):   Condition associated with diagnosis     Signs and Symptoms (as evidenced by):   Abnormal lab values     Interventions/Recommendation (treatment strategy):  1.  To continue with adequate oral intake  2.  To consume oral supplement daily   3.  Collaboration with Medical Providers    Nutrition Diagnosis Status:   Improving and continuing       Malnutrition Assessment                                       Reason for Assessment    Reason For Assessment: RD follow-up    Diagnosis: other (see comments) (s/p surgical procedure)    Relevant Medical History: Nstemi, ESRD on HD, Pulmonary edema, Diabetes    Interdisciplinary Rounds: did not attend    General Information Comments:   7/20/2022:Patient pending surgical procedure at this time.  Patient with NPO status at this time.  Oral supplements previous ordered:  Novosource BID.  Last bowel movement documented on 7/14/2022.   Will continue to monitor npo status and tolerance issues.  (RD remote)  7/22/2022: Patient continues with an NPO status.  Oral supplements previously ordered.  Lab reviewed.  Last bowel movement noted on 7/14/2022.  Will continue to monitor npo status and encourage oral intake when medically feasible.  (RD Remote)  7/26/2022:  Patient now advanced to an oral diet with no tolerance issues noted.  PO intake noted at 75%.  NKFA.  Labs reviewed.  Last BM on 7/24/2022.  (RD remote)    Nutrition Discharge Planning: Patient to continue and tolerate cardiac/renal  "diet.    Nutrition Risk Screen    Nutrition Risk Screen: none     Nutrition/Diet History    Spiritual, Cultural Beliefs, [employer] Practices, Values that Affect Care: no  Food Allergies: NKFA  Factors Affecting Nutritional Intake: none    Anthropometrics    Temp: 98 °F (36.7 °C)  Height Method: Stated  Height: 5' 5" (165.1 cm)  Height (inches): 65 in  Weight Method: Bed Scale  Weight: 59.3 kg (130 lb 11.7 oz)  Weight (lb): 130.73 lb  Ideal Body Weight (IBW), Female: 125 lb  % Ideal Body Weight, Female (lb): 108.8 %  BMI (Calculated): 21.8  BMI Grade: 18.5-24.9 - normal       Lab/Procedures/Meds  BMP  Lab Results   Component Value Date     (L) 07/26/2022     (L) 07/26/2022    K 3.8 07/26/2022    K 3.7 07/26/2022    CL 97 07/26/2022    CL 97 07/26/2022    CO2 26 07/26/2022    CO2 25 07/26/2022    BUN 33 (H) 07/26/2022    BUN 33 (H) 07/26/2022    CREATININE 4.1 (H) 07/26/2022    CREATININE 4.1 (H) 07/26/2022    CALCIUM 8.5 (L) 07/26/2022    CALCIUM 8.7 07/26/2022    ANIONGAP 12 07/26/2022    ANIONGAP 11 07/26/2022    ESTGFRAFRICA 12 (A) 07/26/2022    ESTGFRAFRICA 12 (A) 07/26/2022    EGFRNONAA 10 (A) 07/26/2022    EGFRNONAA 10 (A) 07/26/2022     Lab Results   Component Value Date     (L) 07/26/2022     (L) 07/26/2022    K 3.8 07/26/2022    K 3.7 07/26/2022    CL 97 07/26/2022    CL 97 07/26/2022    CO2 26 07/26/2022    CO2 25 07/26/2022     Lab Results   Component Value Date    CALCIUM 8.5 (L) 07/26/2022    CALCIUM 8.7 07/26/2022    PHOS 2.2 (L) 07/26/2022     Lab Results   Component Value Date    LABPROT 13.5 (H) 07/21/2022    ALBUMIN 2.8 (L) 07/26/2022     Lab Results   Component Value Date    HGBA1C 9.3 (H) 07/12/2022     Lab Results   Component Value Date    CREATININE 4.1 (H) 07/26/2022    CREATININE 4.1 (H) 07/26/2022    BUN 33 (H) 07/26/2022    BUN 33 (H) 07/26/2022     (L) 07/26/2022     (L) 07/26/2022    K 3.8 07/26/2022    K 3.7 07/26/2022    CL 97 07/26/2022    CL 97 " 07/26/2022    CO2 26 07/26/2022    CO2 25 07/26/2022     Scheduled Meds:   ascorbic acid (vitamin C)  500 mg Per OG tube Daily    aspirin  81 mg Per OG tube Daily    calcium gluconate IVPB  1 g Intravenous Once    chlorhexidine  10 mL Mouth/Throat BID    clopidogreL  75 mg Oral Daily    cyanocobalamin  1,000 mcg Per OG tube Daily    famotidine  20 mg Per OG tube Daily    folic acid  1 mg Per OG tube Daily    insulin detemir U-100  5 Units Subcutaneous BID    magnesium hydroxide 400 mg/5 ml  30 mL Per OG tube Daily    metoprolol tartrate  25 mg Per OG tube BID    vitamin renal formula (B-complex-vitamin c-folic acid)  1 capsule Per OG tube Daily     Continuous Infusions:    PRN Meds:.acetaminophen, albumin human 5%, dextrose 10%, glucagon (human recombinant), heparin (porcine), hydrALAZINE, insulin aspart U-100, lactated ringers, melatonin, ondansetron, polyethylene glycol, promethazine (PHENERGAN) IVPB, sodium chloride 0.9%, traMADoL    Pertinent Labs Reviewed: reviewed  Pertinent Medications Reviewed: reviewed    Physical Findings/Assessment         Estimated/Assessed Needs    Weight Used For Calorie Calculations: 61.9 kg (136 lb 7.4 oz)  Energy Calorie Requirements (kcal): 30-35 kcals/kg  Energy Need Method: Kcal/kg (ESRD)  Protein Requirements: 1.2-2.0g/kg  Weight Used For Protein Calculations: 61.9 kg (136 lb 7.4 oz)  Fluid Requirements (mL): 1000+output  Estimated Fluid Requirement Method: other (see comments) (ESRD on HD)  RDA Method (mL): 30  CHO Requirement: 232-270 per day      Nutrition Prescription Ordered    Current Diet Order: diabetic   Oral Nutrition Supplement: Novasource BID    Evaluation of Received Nutrient/Fluid Intake    % Kcal Needs:  75%  % Protein Needs:  75%  Fluid Required:1000+output(ESRD on HD)  Tolerance: tolerating  % Intake of Estimated Energy Needs: 75%  % Meal Intake: 75%    Nutrition Risk    Level of Risk/Frequency of Follow-up: low      Monitor and Evaluation    Food  and Nutrient Intake: energy intake, food and beverage intake  Food and Nutrient Adminstration: diet order  Knowledge/Beliefs/Attitudes: food and nutrition knowledge/skill, beliefs and attitudes  Physical Activity and Function: nutrition-related ADLs and IADLs, factors affecting access to physical activity  Anthropometric Measurements: height/length, weight, weight change, body mass index  Biochemical Data, Medical Tests and Procedures: electrolyte and renal panel, lipid profile, gastrointestinal profile, glucose/endocrine profile, inflammatory profile     Nutrition Follow-Up    RD Follow-up?: Yes     Mariah Sauer, MS, RDN, LDN

## 2022-07-26 NOTE — PROGRESS NOTES
[facility] - Tele[facility] ([facility])  Pulmonology  Progress Note    Patient Name: Avril Monzon  MRN: [id number]  Admission Date: 7/12/2022  Hospital Length of Stay: 14 days  Code Status: Full Code  Attending Provider: Sanju Locke MD  Primary Care Provider: Rose Parks MD   Principal Problem: S/P CABG (coronary artery bypass graft)    Subjective:     Interval History:         7/26 O2 sat 94% on room air.  Status post HD yesterday.  Day 6 post CABG.  Afebrile.  Continues to complain of weakness and decreased activity and appetite.      Review of Systems   Constitutional:  Positive for malaise/fatigue. Negative for chills and fever.   HENT:  Negative for hearing loss and nosebleeds.    Eyes:  Negative for discharge.   Respiratory:  Positive for cough and shortness of breath.    Cardiovascular:  Positive for leg swelling. Negative for chest pain.   Gastrointestinal:  Negative for abdominal pain.   Genitourinary:  Negative for hematuria.   Musculoskeletal:  Negative for falls.   Skin:  Negative for itching.   Neurological:  Positive for weakness. Negative for speech change, seizures and loss of consciousness.   Endo/Heme/Allergies:  Negative for polydipsia. Does not bruise/bleed easily.   Psychiatric/Behavioral:  Negative for hallucinations.        Objective:     Vital Signs (Most Recent):  Temp: 98 °F (36.7 °C) (07/26/22 0800)  Pulse: 75 (07/26/22 0800)  Resp: 18 (07/26/22 0800)  BP: 132/63 (07/26/22 0800)  SpO2: (!) 94 % (07/26/22 0800)   Vital Signs (24h Range):  Temp:  [98 °F (36.7 °C)-99.1 °F (37.3 °C)] 98 °F (36.7 °C)  Pulse:  [68-81] 75  Resp:  [18-20] 18  SpO2:  [91 %-98 %] 94 %  BP: (129-145)/(60-73) 132/63     Weight: 59.3 kg (130 lb 11.7 oz)  Body mass index is 21.76 kg/m².      Intake/Output Summary (Last 24 hours) at 7/26/2022 1158  Last data filed at 7/25/2022 1233  Gross per 24 hour   Intake --   Output 2000 ml   Net -2000 ml         Physical Exam  Vitals and nursing note reviewed.   Constitutional:        General: She is not in acute distress.     Appearance: She is well-developed. She is ill-appearing.   HENT:      Head: Normocephalic and atraumatic.      Nose: Nose normal.   Eyes:      Extraocular Movements: Extraocular movements intact.   Cardiovascular:      Rate and Rhythm: Normal rate and regular rhythm.   Pulmonary:      Effort: Pulmonary effort is normal.      Breath sounds: No stridor.      Comments: Sternal wound dressing  Abdominal:      General: There is no distension.   Musculoskeletal:         General: Deformity present. No signs of injury.      Cervical back: Normal range of motion and neck supple.      Comments: Left upper AVF   Skin:     General: Skin is dry.   Neurological:      General: No focal deficit present.      Mental Status: She is alert and oriented to person, place, and time. Mental status is at baseline.   Psychiatric:         Behavior: Behavior normal.       Vents:  Vent Mode: (S) Spont (07/22/22 0800)  Ventilator Initiated: Yes (07/20/22 1409)  Set Rate: 0 BPM (07/22/22 0800)  Vt Set: 375 mL (07/22/22 0800)  Pressure Support: 10 cmH20 (07/22/22 0800)  PEEP/CPAP: 5 cmH20 (07/22/22 0800)  Oxygen Concentration (%): 30 (07/22/22 0900)  Peak Airway Pressure: 15 cmH2O (07/22/22 0800)  Plateau Pressure: 20 cmH20 (07/22/22 0800)  Total Ve: 8.84 mL (07/22/22 0800)  F/VT Ratio<105 (RSBI): (!) 92.74 (07/22/22 0800)    Lines/Drains/Airways       Drain  Duration                  Hemodialysis AV Fistula 04/01/20 2203 Left upper arm 845 days              Peripheral Intravenous Line  Duration                  Peripheral IV - Single Lumen 07/18/22 0625 20 G Posterior;Right Wrist 8 days         Peripheral IV - Single Lumen 07/25/22 0615 20 G Right Antecubital 1 day                    Significant Labs:    CBC/Anemia Profile:  Recent Labs   Lab 07/25/22  0621 07/26/22  0502   WBC 10.13 9.08   HGB 10.1* 10.6*   HCT 30.1* 31.6*   * 159   MCV 91 90   RDW 15.7* 15.5*          Chemistries:  Recent  Labs   Lab 07/25/22  1635 07/25/22  2213 07/26/22  0502   * 136 133*  135*   K 4.0 3.9 3.7  3.8   CL 96 97 97  97   CO2 27 27 25  26   BUN 21 29* 33*  33*   CREATININE 2.8* 3.5* 4.1*  4.1*   CALCIUM 8.5* 8.5* 8.7  8.5*   ALBUMIN 2.9* 2.9* 2.8*   MG 2.4 2.5 2.6   PHOS 1.6* 1.6* 2.2*        Latest Reference Range & Units 07/12/22 20:20 07/12/22 22:56 07/13/22 02:00   Troponin I 0.000 - 0.026 ng/mL >50.000 (H) >50.000 (H) >50.000 (H)   (H): Data is abnormally high   Latest Reference Range & Units 07/22/22 04:21   POC PH 7.35 - 7.45  7.476 (H)   POC PCO2 35 - 45 mmHg 31.0 (L)   POC PO2 80 - 100 mmHg 79 (L)   POC HCO3 24 - 28 mmol/L 22.9 (L)   POC SATURATED O2 95 - 100 % 97   POC BE -2 to 2 mmol/L -1   FiO2  30   Vt  375   PEEP  5   Sample  ARTERIAL   DelSys  Adult Vent   Allens Test  N/A   Site  Sofia/UAC   Mode  AC/PRVC   Rate  20     EKG 07/12/2022  Vent. Rate : 082 BPM     Atrial Rate : 082 BPM      P-R Int : 148 ms          QRS Dur : 084 ms       QT Int : 442 ms       P-R-T Axes : 070 030 -65 degrees      QTc Int : 516 ms     Normal sinus rhythm   LVH with repolarization abnormality ( Boomer product )   Prolonged QT   When compared with ECG of 12-JUL-2022 10:27,   ST less depressed in Inferior leads   T wave inversion now evident in Anterior leads       2D echo 07/12/2022    · Concentric hypertrophy and normal systolic function.  · The estimated ejection fraction is 55%.  · Normal left ventricular diastolic function.  · Normal right ventricular size with normal right ventricular systolic function.  · Moderate tricuspid regurgitation.  · There is pulmonary hypertension.              Significant Imaging:      Chest x-ray 07/22/2022    CLINICAL HISTORY:  Post-op; Atherosclerotic heart disease of native coronary artery without angina pectoris     TECHNIQUE:  Single frontal view of the chest was performed.     COMPARISON:  07/21/2022     FINDINGS:  Tubes and lines are stable.   In comparison to the prior  study, there is no adverse interval changes                  ABG  Recent Labs   Lab 07/22/22  0421   PH 7.476*   PO2 79*   PCO2 31.0*   HCO3 22.9*   BE -1     Assessment/Plan:     * S/P CABG (coronary artery bypass graft)  - per CTS mgmt  - Day 2: s/p CABG to    1. pediciled LIMA to LAD   2. Saphenous vein graft to obtuse marginal  - pressors to maintain MAP >65, wean as clinically tolerated  - CT drainage stable  - Cont to monitor CBC  - ASA, plavix, BB, statin as tolerated   7/23 status post CABG x2.  Aspirin beta-blocker.   7/24 Aspirin Plavix beta-blocker  7/25  drains removed.  Outpatient CT surgery follow-up  7/26 aspirin beta-blocker Plavix    NSTEMI (non-ST elevated myocardial infarction)  - see s/p CABG assessment & plan  7/23 status post CABG x2.  Aspirin beta-blocker  7/24 aspirin Plavix beta-blocker status post CABG x2  7/25 postop day 5. .  Aspirin beta-blocker Plavix  7/26 postop day 6. Aspirin beta-blocker Plavix.      Pulmonary hypertension  - hx of PA pressure 69.17 in 2018  - CTS managing  - PA pressure >70mmHg intraop  - nitric weaned yesterday with PA pressures ~45-50  - CRRT at goal net zero  7/26 volume contraction with HD.    ESRD (end stage renal disease)  - per nephrology mgmt  - CRRT remains on at  goal net of zero  - monitor BMP  7/23 off CRRT.  Conventional HD as per Nephrology.  7/24 HD as per Nephrology.  7/25 on HD today.  Outpatient follow-up with Nephrology  7/26 HD 3 times weekly           Hattie Stephens MD  Pulmonology  [facility] - Telemetry ([facility])

## 2022-07-26 NOTE — ASSESSMENT & PLAN NOTE
- hx of PA pressure 69.17 in 2018  - CTS managing  - PA pressure >70mmHg intraop  - nitric weaned yesterday with PA pressures ~45-50  - CRRT at goal net zero  7/26 volume contraction with HD.

## 2022-07-26 NOTE — PROGRESS NOTES
[facility] - Intensive Care ([facility]  Nephrology  Progress Note    Patient Name: Avril Monzon  MRN: [id number]  Admission Date: 7/12/2022  Hospital Length of Stay: 14 days  Attending Provider: Sanju Locke MD   Primary Care Physician: Rose Parks MD  Principal Problem:S/P CABG (coronary artery bypass graft)    Subjective:     HPI: Noted    No new subjective & objective note has been filed under this hospital service since the last note was generated.    *First visit with patient; 7/25/22: Denies any shortness of breath or chest pain.     *For 7/26/22: Seen and examined; chart reviewed; had HD yesterday for 2 liters UF.       Chest CTA BL  CV RRR without murmur  LUE AVF with good thrill/bruit  Neuro alert and oriented x 3  abd soft NT ND     Latest Reference Range & Units 07/25/22 06:21   Sodium 136 - 145 mmol/L  136 - 145 mmol/L 134 (L)  136   Potassium 3.5 - 5.1 mmol/L  3.5 - 5.1 mmol/L 3.7  3.8   Chloride 95 - 110 mmol/L  95 - 110 mmol/L 99  100   CO2 23 - 29 mmol/L  23 - 29 mmol/L 25  26   Anion Gap 8 - 16 mmol/L  8 - 16 mmol/L 10  10   BUN 8 - 23 mg/dL  8 - 23 mg/dL 36 (H)  36 (H)   Creatinine 0.5 - 1.4 mg/dL  0.5 - 1.4 mg/dL 4.2 (H)  4.2 (H)   eGFR if non African American >60 mL/min/1.73 m^2  >60 mL/min/1.73 m^2 10 !  10 !   eGFR if African American >60 mL/min/1.73 m^2  >60 mL/min/1.73 m^2 11 !  11 !   Glucose 70 - 110 mg/dL  70 - 110 mg/dL 124 (H)  125 (H)   Calcium 8.7 - 10.5 mg/dL  8.7 - 10.5 mg/dL 8.4 (L)  8.5 (L)   Phosphorus 2.7 - 4.5 mg/dL  2.7 - 4.5 mg/dL 2.1 (L)  2.0 (L)   (L): Data is abnormally low  (H): Data is abnormally high  !: Data is abnormal     Latest Reference Range & Units 07/25/22 06:21   WBC 3.90 - 12.70 K/uL 10.13   RBC 4.00 - 5.40 M/uL 3.32 (L)   Hemoglobin 12.0 - 16.0 g/dL 10.1 (L)   Hematocrit 37.0 - 48.5 % 30.1 (L)   MCV 82 - 98 fL 91   MCH 27.0 - 31.0 pg 30.4   MCHC 32.0 - 36.0 g/dL 33.6   RDW 11.5 - 14.5 % 15.7 (H)   Platelets 150 - 450 K/uL 144 (L)   MPV 9.2 - 12.9 fL 9.3    Gran % 38.0 - 73.0 % 82.5 (H)   Lymph % 18.0 - 48.0 % 9.0 (L)   Mono % 4.0 - 15.0 % 6.8   Eosinophil % 0.0 - 8.0 % 0.9   Basophil % 0.0 - 1.9 % 0.3   Immature Granulocytes 0.0 - 0.5 % 0.5   Gran # (ANC) 1.8 - 7.7 K/uL 8.4 (H)   Lymph # 1.0 - 4.8 K/uL 0.9 (L)   Mono # 0.3 - 1.0 K/uL 0.7   Eos # 0.0 - 0.5 K/uL 0.1   Baso # 0.00 - 0.20 K/uL 0.03   (L): Data is abnormally low  (H): Data is abnormally high        Assessment/Plan:     76 y/o female with ESRD on chronic HD presented with acute coronary syndrome:     ESRD (end stage renal disease)  ESRD pt on chronic HD q MWF at [facility]  On CRRT from CABG on 7/20/22 until 7/23/22  Good response to CRRT  Switched back to conventional HD  On HD, s/p HD yesterday, tolerated HD well  Next HD in am (proior schedule), orders placed in chart     K normal  Hyponatremia  Metabolic acidosis stable   HTN: BP controlled. Meds reviewed  Anemia: normocytic. On epogen      * Acute coronary events  NSTEMI  S/p CABG on 7/20/22  Hemodynamically stable, doing well clinically      Type 2 diabetes mellitus with hyperglycemia, with long-term current use of insulin  Reviewed the chart.        Plans and recommendations:  As discussed above  Total critical care time spent 40 minutes including time needed to review the records, the   patient evaluation, documentation, discussion with ICU and other providers,   more than 50% of the time was spent on coordination of care and counseling.       *For 7/25/22: Next HD session planned for today; likely will only tolerate 2 liters UF; not appearing volume overloaded; meds and labs reviewed; access stable; following closely with you; next session after today would be Wednesday. Phos replacement x 1 dosed.     *For 7/26/22: Next HD session in morning prior to planned discharge optimally.  See CPOE; following closely with you.  Access stable with good thrill/bruit.     Gordo Henriquez MD  Nephrology  [facility] - Intensive Care ([facility])

## 2022-07-26 NOTE — PROGRESS NOTES
Subjective:      Patient ID: Avril Monzon is a 77 y.o. female.    Chief Complaint: Chest Pain (Seen at urgent care yesterday; c/o sharp pain with shortness of breath; hx of MI three years ago)    HPI:  77-year-old female who was admitted with the NSTEMI had i left heart catheterization which revealed 3 vessel coronary artery disease.  Patient has previous PCI to her proximal LAD and she has the diabetic hypertension hyperlipidemia end-stage renal disease on dialysis.  She also had pulmonary hypertension with EF of 40% global hypokinesis he moderate mitral regurgitation preop.  She was chronically anemic and history of smoking in the past she has diabetic retinopathy.  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  The patient had multiple dialysis she was also dated for the Plavix washout before she was taken up for surgery.  The patient had CABG x2 on a beating heart pump assisted coronary artery bypass grafting in the postoperative.  The patient had pulmonary hypertension with right heart failure for which she was assisted with nitric oxide inhalation she came to the ICU and was started on her planned CRRT.  The patient remained coagulopathic and hypo thermic overnight for which she was treated with FFP cryo and also packed cells she is on epinephrine Primacor and intermittent vasopressin she is awake and follows simple commands and this morning her nitric oxide is being weaned off.  7/22/22 the patient nitric oxide has been weaned off she remained hemodynamically stable had AP is downto0 4and a Primacor is 0.2 she is awake following commands  07/23/2022 patient was extubated of nitric oxide sitting up in a chair hemodynamically stable off inotropes her CRRT was stopped yesterday plan is to get dialysis today .  The chest tube output has considerably slowed down serous  drainage.  07/24/2022 the patient was extubated yesterday and she got hemodialyzed she is tolerating regular diet she will control has been fair No pain issues good motivation  07/25/2022 the patient had a quiet night overnight no major issues she is sitting up in a chair eating breakfast this morning  Still in place will DC that this morning she has floor  \ arteries to go to.  07/26/2022 the patient had at comfortable overnight no acute issues she does complain of toe discoloration on the right side.    Review of patient's allergies indicates:   Allergen Reactions    Codeine Swelling    Darvon [propoxyphene] Swelling    Atorvastatin      Muscle twitching       Past Medical History:   Diagnosis Date    Acute hypoxemic respiratory failure 11/26/2018    Anemia     Arthritis     back, hand, knees    Back pain     Cataract     CKD stage G4/A3, GFR 15 - 29 and albumin creatinine ratio >300 mg/g     GFR 40% Jan 2014 and 33% ub 3/2014 ([facility])    Coronary artery disease involving native coronary artery of native heart 11/30/2018    Diabetic retinopathy     DM (diabetes mellitus) type II controlled, neurological manifestation     Exudative age-related macular degeneration of left eye with active choroidal neovascularization 2/5/2019    GERD (gastroesophageal reflux disease)     Glaucoma     Heart failure     Hyperlipidemia     Hypertension     Non-ST elevation MI (NSTEMI) 11/28/2018    NSTEMI (non-ST elevated myocardial infarction) 11/27/2018    Peripheral neuropathy     Polyneuropathy     Proteinuria     >6 mo     Seizures     [facility] 1/2014 -dick CT NRI showed small vessel    Stroke 2012,2014    Tobacco dependence     Type 2 diabetes with peripheral circulatory disorder, controlled        Family History   Problem Relation Age of Onset    Diabetes Mother     Hyperlipidemia Mother     Hypertension Mother     Heart disease Mother         MI    Muscular dystrophy Son     Cancer Maternal Grandmother          colon       Social History     Socioeconomic History    Marital status:    Tobacco Use    Smoking status: Former Smoker     Packs/day: 1.50     Years: 30.00     Pack years: 45.00     Types: Cigarettes     Quit date: 1990     Years since quittin.5    Smokeless tobacco: Former User   Substance and Sexual Activity    Alcohol use: No     Alcohol/week: 0.0 standard drinks    Drug use: No    Sexual activity: Not Currently       Past Surgical History:   Procedure Laterality Date    BREAST LUMPECTOMY Left     benign, when patient was 15 years old    BREAST MASS EXCISION      ,benign, age 15.    CATHETERIZATION OF BOTH LEFT AND RIGHT HEART N/A 2018    Procedure: CATHETERIZATION, HEART, BOTH LEFT AND RIGHT;  Surgeon: Michelle Holman MD;  Location: [facility] CATH LAB;  Service: Cardiology;  Laterality: N/A;    CATHETERIZATION OF BOTH LEFT AND RIGHT HEART N/A 2018    Procedure: CATHETERIZATION, HEART, BOTH LEFT AND RIGHT;  Surgeon: Michelle Holman MD;  Location: [facility] CATH LAB;  Service: Cardiology;  Laterality: N/A;    CORONARY ARTERY BYPASS GRAFT (CABG) N/A 2022    Procedure: CORONARY ARTERY BYPASS GRAFT (CABG);  Surgeon: Sanju Locke MD;  Location: [facility] OR;  Service: Cardiothoracic;  Laterality: N/A;  2-VESSEL PUMP ASSIST WITH EPI-AORTIC ULTRASOUND    ENDOSCOPIC HARVEST OF VEIN Left 2022    Procedure: SURGICAL PROCUREMENT, VEIN, ENDOSCOPIC;  Surgeon: Sanju Locke MD;  Location: [facility] OR;  Service: Cardiothoracic;  Laterality: Left;    HYSTERECTOMY  1982    INJECTION OF ANESTHETIC AGENT AROUND MULTIPLE INTERCOSTAL NERVES N/A 2022    Procedure: BLOCK, NERVE, INTERCOSTAL, 2 OR MORE;  Surgeon: Sanju Locke MD;  Location: [facility] OR;  Service: Cardiothoracic;  Laterality: N/A;  PARASTERNAL NERVE BLOCK    INSERTION OF SHUNT      LEFT HEART CATHETERIZATION Left 12/3/2018    Procedure: CATHETERIZATION, HEART, LEFT;  Surgeon: Michelle Holman MD;  Location: [facility]  "CATH LAB;  Service: Cardiology;  Laterality: Left;  LAD ATHERECTOMY STENT/ FEMORAL APPROACH    LEFT HEART CATHETERIZATION Left 7/13/2022    Procedure: CATHETERIZATION, HEART, LEFT;  Surgeon: Abhi Sloan MD;  Location: [facility] CATH LAB;  Service: Cardiology;  Laterality: Left;    OOPHORECTOMY      wrist cyst Right 1980s    dorsal wrist cyst       Review of Systems   Unable to perform ROS: Acuity of condition   Constitutional: Negative for activity change and appetite change.   HENT: Negative for dental problem, nosebleeds and sore throat.    Eyes: Negative for discharge and visual disturbance.   Respiratory: Negative for cough, chest tightness and stridor.    Cardiovascular: Negative for leg swelling.   Gastrointestinal: Negative for abdominal distention and abdominal pain.   Genitourinary: Negative for difficulty urinating and dysuria.   Musculoskeletal: Negative for arthralgias, back pain and joint swelling.   Allergic/Immunologic: Negative for environmental allergies.   Neurological: Negative for dizziness, syncope and headaches.   Hematological: Does not bruise/bleed easily.   Psychiatric/Behavioral: Negative for behavioral problems.          Objective:   /63 (Patient Position: Lying)   Pulse 75   Temp 98 °F (36.7 °C) (Oral)   Resp 18   Ht 5' 5" (1.651 m)   Wt 59.3 kg (130 lb 11.7 oz)   LMP  (LMP Unknown)   SpO2 (!) 94%   Breastfeeding No   BMI 21.76 kg/m²     X-Ray Chest AP Portable  Narrative: EXAMINATION:  XR CHEST AP PORTABLE    CLINICAL HISTORY:  Post-op; Atherosclerotic heart disease of native coronary artery without angina pectoris    TECHNIQUE:  Single frontal view of the chest was performed.    COMPARISON:  07/21/2022    FINDINGS:  Tubes and lines are stable.   In comparison to the prior study, there is no adverse interval changes.  Impression: In comparison to the prior study, there is no adverse interval changes    Electronically signed by: Sebastien Trent, " MD  Date:    07/22/2022  Time:    06:55         Physical Exam  Vitals and nursing note reviewed.   Constitutional:       Appearance: Normal appearance.      Comments: Sitting up in a chair   HENT:      Head: Normocephalic and atraumatic.      Mouth/Throat:      Mouth: Mucous membranes are moist.   Eyes:      Extraocular Movements: Extraocular movements intact.      Pupils: Pupils are equal, round, and reactive to light.   Cardiovascular:      Rate and Rhythm: Normal rate and regular rhythm.      Pulses: Normal pulses.      Heart sounds:     Friction rub present.   Pulmonary:      Effort: Pulmonary effort is normal.      Breath sounds: Normal breath sounds.   Abdominal:      General: Abdomen is flat.      Palpations: Abdomen is soft.   Musculoskeletal:      Cervical back: Normal range of motion and neck supple.      Right lower leg: Edema present.      Left lower leg: Edema present.      Comments: Right-sided tip of the 2nd and 3rd toe cyanosis palpable dorsalis pedis artery and a weak posterior tibial artery   Skin:     General: Skin is warm.      Capillary Refill: Capillary refill takes less than 2 seconds.   Neurological:      General: No focal deficit present.      Mental Status: She is alert and oriented to person, place, and time. Mental status is at baseline.   Psychiatric:         Mood and Affect: Mood normal.         Assessment:     1. Coronary artery disease involving native heart, unspecified vessel or lesion type, unspecified whether angina present    2. Chest pain    3. NSTEMI (non-ST elevated myocardial infarction)    4. Diabetic ketoacidosis without coma associated with other specified diabetes mellitus    5. Hyperkalemia    6. ESRD (end stage renal disease)    7. Elevated troponin I level    8. Congestive heart failure, unspecified HF chronicity, unspecified heart failure type    9. Elevated troponin    10. Post-operative state    11. S/P CABG (coronary artery bypass graft)    12. CAD (coronary artery  disease)    13. DKA (diabetic ketoacidosis)    14. Hypertension associated with diabetes    15. Metabolic acidosis    16. On mechanically assisted ventilation          Plan   CABG x2 postop day 5 severe pulmonary hypertension end-stage renal disease on dialysis  Aggressive pulmonary toilet  Aspirin  Hyperlipidemia she is allergic to statins  Hypertension beta-blockers  Anemia acute blood loss postoperative continue to monitor  Coagulopathy resolved  Diabetes mellitus on insulin sliding scale and long-acting on a diabetic diet  Right foot diabetic foot.  Will get a Doppler at the time of discharge.  DVT and GI prophylaxis Pepcid and SCDs  Nutrition diabetic diet  End-stage renal disease hemodialysis per Renal  Transferred to the floor  PTOT  DC planning.    I spent 35 minutes rounding on the patient with multi disciplinary team and patient care          Sanju Locke MD  [facility]  [place]

## 2022-07-26 NOTE — PLAN OF CARE
Ongoing (interventions implemented as appropriate)  Pt AAO x4.  VSS.   Visually impaired  Pt able to make needs known.  Pt remained afebrile throughout this shift.   Pt up to chair with standby assist, gait unsteady   Pt remained free of falls this shift.   Plan of care reviewed. Patient verbalizes understanding.   Pt moving/turing independent. Frequent weight shifting encouraged.  Patient sinus rhythm on monitor.   Bed low, side rails up x 2, wheels locked, call light in reach.   Hourly rounding completed.   Will continue to monitor.

## 2022-07-26 NOTE — PT/OT/SLP PROGRESS
Physical Therapy  Treatment    Avril Monzon   MRN: [id number]   Admitting Diagnosis: S/P CABG (coronary artery bypass graft)    PT Received On: 07/26/22  PT Start Time: 1030     PT Stop Time: 1100    PT Total Time (min): 30 min       Billable Minutes:  Gait Training 10 and Therapeutic Activity 20    Treatment Type: Treatment  PT/PTA: PTA     PTA Visit Number: 1       General Precautions: Standard, fall, sternal  Orthopedic Precautions: N/A   Braces: N/A  Respiratory Status: Room air         Subjective:  Communicated with patient's nurse and completed Epic chart review prior to session.  Patient agreed to PT session. Unable to recall sternal precautions.     Pain/Comfort  Pain Rating 1: 0/10    Objective:   Patient found with: telemetry, peripheral IV    Functional Mobility:  Reviewed sternal precautions and re enforced importance of compliance. (Once educated on her precautions, patient gave excellent effort to maintain compliance throughout session)    Rolling R/L to chris brief: Min A    Supine > sit EOB: Min A    STS from EOB No AD: CGA-Min A    60ft No AD CGA    Stand pivot T/F to chair No AD: CGA    Educated patient on AROM TE to be completed throughout the day in order to maintain current level of ROM and strength. Patient successfully demonstrated x10 reps of LAQ, Hip Flex & AP. Encouraged patient to increase time OOB and up in chair with a minimum goal of 2 consecutive hours. Re enforced importance of utilizing call light to meet all needs in room and not attempt to get up without staff assistance. Patient verbalized understanding of all education given and agreed to comply.     AM-PAC 6 CLICK MOBILITY  How much help from another person does this patient currently need?   1 = Unable, Total/Dependent Assistance  2 = A lot, Maximum/Moderate Assistance  3 = A little, Minimum/Contact Guard/Supervision  4 = None, Modified [place]/Independent    Turning over in bed (including adjusting bedclothes, sheets and  blankets)?: 3  Sitting down on and standing up from a chair with arms (e.g., wheelchair, bedside commode, etc.): 3  Moving from lying on back to sitting on the side of the bed?: 3  Moving to and from a bed to a chair (including a wheelchair)?: 3  Need to walk in hospital room?: 3  Climbing 3-5 steps with a railing?: 1  Basic Mobility Total Score: 16    AM-PAC Raw Score CMS G-Code Modifier Level of Impairment Assistance   6 % Total / Unable   7 - 9 CM 80 - 100% Maximal Assist   10 - 14 CL 60 - 80% Moderate Assist   15 - 19 CK 40 - 60% Moderate Assist   20 - 22 CJ 20 - 40% Minimal Assist   23 CI 1-20% SBA / CGA   24 CH 0% Independent/ Mod I     Patient left up in chair with call button in reach & MD present.    Assessment:  Avril Monzon is a 77 y.o. female with a medical diagnosis of S/P CABG (coronary artery bypass graft) and presents with overall decline in functional mobility. Patient would continue to benefit from skilled PT to address functional limitations listed below in order to return to PLOF/decrease caregiver burden.     Rehab identified problem list/impairments: Rehab identified problem list/impairments: weakness, impaired endurance, impaired self care skills, impaired balance, impaired functional mobility, gait instability, decreased ROM, decreased upper extremity function, impaired cardiopulmonary response to activity, other (comment) (STERNAL PRECAUTIONS)    Rehab potential is good.    Activity tolerance: Fair    Discharge recommendations: Discharge Facility/Level of Care Needs: nursing facility, skilled     Barriers to discharge:      Equipment recommendations: Equipment Needed After Discharge: shower chair     GOALS:   Multidisciplinary Problems     Physical Therapy Goals        Problem: Physical Therapy    Goal Priority Disciplines Outcome Goal Variances Interventions   Physical Therapy Goal     PT, PT/OT      Description: LTGs to be met in 14 days time (8/5/22)  Pt will perform bed  mobility with SUP in order to participate in EOB activity.  Pt will tolerate sitting EOB x 10 mins with SUP in order to participate in daily tasks.   Pt will tolerate siting OOB x 3-4 hours in order to participate in daily tasks.   Pt will ambulate 100ft SUP in order to safely navigate surroundings.                    PLAN:    Patient to be seen 3 x/week  to address the above listed problems via gait training, therapeutic activities, therapeutic exercises  Plan of Care expires: 08/05/22  Plan of Care reviewed with: patient         07/26/2022

## 2022-07-26 NOTE — SUBJECTIVE & OBJECTIVE
Review of Systems   Constitutional: Negative.   HENT: Negative.     Eyes: Negative.    Respiratory: Negative.     Objective:     Vital Signs (Most Recent):  Temp: 98.1 °F (36.7 °C) (07/26/22 1231)  Pulse: 73 (07/26/22 1231)  Resp: 18 (07/26/22 1231)  BP: (!) 116/58 (07/26/22 1231)  SpO2: 96 % (07/26/22 1231)   Vital Signs (24h Range):  Temp:  [98 °F (36.7 °C)-99.1 °F (37.3 °C)] 98.1 °F (36.7 °C)  Pulse:  [68-81] 73  Resp:  [18-20] 18  SpO2:  [91 %-98 %] 96 %  BP: (116-145)/(58-73) 116/58     Weight: 59.3 kg (130 lb 11.7 oz)  Body mass index is 21.76 kg/m².     SpO2: 96 %  O2 Device (Oxygen Therapy): room air    No intake or output data in the 24 hours ending 07/26/22 1310    Lines/Drains/Airways       Drain  Duration                  Hemodialysis AV Fistula 04/01/20 2203 Left upper arm 845 days              Peripheral Intravenous Line  Duration                  Peripheral IV - Single Lumen 07/18/22 0625 20 G Posterior;Right Wrist 8 days         Peripheral IV - Single Lumen 07/25/22 0615 20 G Right Antecubital 1 day                    Physical Exam  Vitals and nursing note reviewed.   Constitutional:       General: She is not in acute distress.     Appearance: Normal appearance. She is well-developed. She is not diaphoretic.   HENT:      Head: Normocephalic and atraumatic.   Eyes:      General:         Right eye: No discharge.         Left eye: No discharge.      Pupils: Pupils are equal, round, and reactive to light.   Neck:      Thyroid: No thyromegaly.      Vascular: No JVD.      Trachea: No tracheal deviation.   Cardiovascular:      Rate and Rhythm: Normal rate and regular rhythm.      Heart sounds: Normal heart sounds, S1 normal and S2 normal. No murmur heard.     Comments: Sternotomy site C/D/I; dressing in place  Pulmonary:      Effort: Pulmonary effort is normal. No respiratory distress.      Breath sounds: Normal breath sounds. No wheezing or rales.   Abdominal:      General: There is no distension.       Palpations: Abdomen is soft.      Tenderness: There is no rebound.   Musculoskeletal:      Cervical back: Neck supple.      Right lower leg: No edema.      Left lower leg: No edema.   Skin:     General: Skin is warm and dry.      Findings: No erythema.   Neurological:      General: No focal deficit present.      Mental Status: She is alert and oriented to person, place, and time.   Psychiatric:         Mood and Affect: Mood normal.         Behavior: Behavior normal.         Thought Content: Thought content normal.       Significant Labs: CMP   Recent Labs   Lab 07/25/22  1635 07/25/22  2213 07/26/22  0502   * 136 133*  135*   K 4.0 3.9 3.7  3.8   CL 96 97 97  97   CO2 27 27 25  26   * 172* 150*  146*   BUN 21 29* 33*  33*   CREATININE 2.8* 3.5* 4.1*  4.1*   CALCIUM 8.5* 8.5* 8.7  8.5*   ALBUMIN 2.9* 2.9* 2.8*   ANIONGAP 12 12 11  12   ESTGFRAFRICA 18* 14* 12*  12*   EGFRNONAA 16* 12* 10*  10*   , CBC   Recent Labs   Lab 07/25/22  0621 07/26/22  0502   WBC 10.13 9.08   HGB 10.1* 10.6*   HCT 30.1* 31.6*   * 159   , Troponin No results for input(s): TROPONINI in the last 48 hours., and All pertinent lab results from the last 24 hours have been reviewed.    Significant Imaging: Echocardiogram: Transthoracic echo (TTE) complete (Cupid Only):   Results for orders placed or performed during the hospital encounter of 07/12/22   Echo   Result Value Ref Range    BSA 1.68 m2    IVC diameter 1.93 cm    Left Ventricular Outflow Tract Mean Velocity 0.665756635610580 cm/s    Left Ventricular Outflow Tract Mean Gradient 1.94 mmHg    LVIDd 4.43 3.5 - 6.0 cm    IVS 1.15 (A) 0.6 - 1.1 cm    Posterior Wall 0.96 0.6 - 1.1 cm    Ao root annulus 2.78 cm    LVIDs 3.44 2.1 - 4.0 cm    FS 22 28 - 44 %    STJ 2.42 cm    Ascending aorta 2.32 cm    LV mass 161.00 g    LA size 3.85 cm    Left Ventricle Relative Wall Thickness 0.43 cm    AV mean gradient 4 mmHg    AV valve area 1.56 cm2    AV Velocity Ratio 0.79      AV index (prosthetic) 0.78     MV valve area p 1/2 method 6.15 cm2    E/A ratio 1.31     E wave deceleration time 123.915122986019080 msec    IVRT 39.497450220388153 msec    LVOT diameter 1.60 cm    LVOT area 2.0 cm2    LVOT peak lio 1.00 m/s    LVOT peak VTI 19.70 cm    Ao peak lio 1.26 m/s    Ao VTI 25.4 cm    RVOT peak lio 0.62 m/s    RVOT peak VTI 13.3 cm    Mr max lio 6.14 m/s    LVOT stroke volume 39.59 cm3    AV peak gradient 6 mmHg    PV mean gradient 0.85 mmHg    MV Peak E Lio 1.11 m/s    TR Max Lio 4.26 m/s    MV stenosis pressure 1/2 time 35.257579043654770 ms    MV Peak A Lio 0.85 m/s    LV Systolic Volume 48.80 mL    LV Systolic Volume Index 29.0 mL/m2    LV Diastolic Volume 88.87 mL    LV Diastolic Volume Index 52.90 mL/m2    LV Mass Index 96 g/m2    RA Major Axis 3.88 cm    Left Atrium Minor Axis 3.59 cm    Left Atrium Major Axis 4.08 cm    Triscuspid Valve Regurgitation Peak Gradient 73 mmHg    EF 55 %    Narrative    · Concentric hypertrophy and normal systolic function.  · The estimated ejection fraction is 55%.  · Normal left ventricular diastolic function.  · Normal right ventricular size with normal right ventricular systolic   function.  · Moderate tricuspid regurgitation.  · There is pulmonary hypertension.      , EKG: Reviewed, and X-Ray: CXR: X-Ray Chest 1 View (CXR): No results found for this visit on 07/12/22. and X-Ray Chest PA and Lateral (CXR): No results found for this visit on 07/12/22.

## 2022-07-26 NOTE — SUBJECTIVE & OBJECTIVE
Interval History:         7/26 O2 sat 94% on room air.  Status post HD yesterday.  Day 6 post CABG.  Afebrile.  Continues to complain of weakness and decreased activity and appetite.      Review of Systems   Constitutional:  Positive for malaise/fatigue. Negative for chills and fever.   HENT:  Negative for hearing loss and nosebleeds.    Eyes:  Negative for discharge.   Respiratory:  Positive for cough and shortness of breath.    Cardiovascular:  Positive for leg swelling. Negative for chest pain.   Gastrointestinal:  Negative for abdominal pain.   Genitourinary:  Negative for hematuria.   Musculoskeletal:  Negative for falls.   Skin:  Negative for itching.   Neurological:  Positive for weakness. Negative for speech change, seizures and loss of consciousness.   Endo/Heme/Allergies:  Negative for polydipsia. Does not bruise/bleed easily.   Psychiatric/Behavioral:  Negative for hallucinations.        Objective:     Vital Signs (Most Recent):  Temp: 98 °F (36.7 °C) (07/26/22 0800)  Pulse: 75 (07/26/22 0800)  Resp: 18 (07/26/22 0800)  BP: 132/63 (07/26/22 0800)  SpO2: (!) 94 % (07/26/22 0800)   Vital Signs (24h Range):  Temp:  [98 °F (36.7 °C)-99.1 °F (37.3 °C)] 98 °F (36.7 °C)  Pulse:  [68-81] 75  Resp:  [18-20] 18  SpO2:  [91 %-98 %] 94 %  BP: (129-145)/(60-73) 132/63     Weight: 59.3 kg (130 lb 11.7 oz)  Body mass index is 21.76 kg/m².      Intake/Output Summary (Last 24 hours) at 7/26/2022 1158  Last data filed at 7/25/2022 1233  Gross per 24 hour   Intake --   Output 2000 ml   Net -2000 ml         Physical Exam  Vitals and nursing note reviewed.   Constitutional:       General: She is not in acute distress.     Appearance: She is well-developed. She is ill-appearing.   HENT:      Head: Normocephalic and atraumatic.      Nose: Nose normal.   Eyes:      Extraocular Movements: Extraocular movements intact.   Cardiovascular:      Rate and Rhythm: Normal rate and regular rhythm.   Pulmonary:      Effort: Pulmonary  effort is normal.      Breath sounds: No stridor.      Comments: Sternal wound dressing  Abdominal:      General: There is no distension.   Musculoskeletal:         General: Deformity present. No signs of injury.      Cervical back: Normal range of motion and neck supple.      Comments: Left upper AVF   Skin:     General: Skin is dry.   Neurological:      General: No focal deficit present.      Mental Status: She is alert and oriented to person, place, and time. Mental status is at baseline.   Psychiatric:         Behavior: Behavior normal.       Vents:  Vent Mode: (S) Spont (07/22/22 0800)  Ventilator Initiated: Yes (07/20/22 1409)  Set Rate: 0 BPM (07/22/22 0800)  Vt Set: 375 mL (07/22/22 0800)  Pressure Support: 10 cmH20 (07/22/22 0800)  PEEP/CPAP: 5 cmH20 (07/22/22 0800)  Oxygen Concentration (%): 30 (07/22/22 0900)  Peak Airway Pressure: 15 cmH2O (07/22/22 0800)  Plateau Pressure: 20 cmH20 (07/22/22 0800)  Total Ve: 8.84 mL (07/22/22 0800)  F/VT Ratio<105 (RSBI): (!) 92.74 (07/22/22 0800)    Lines/Drains/Airways       Drain  Duration                  Hemodialysis AV Fistula 04/01/20 2203 Left upper arm 845 days              Peripheral Intravenous Line  Duration                  Peripheral IV - Single Lumen 07/18/22 0625 20 G Posterior;Right Wrist 8 days         Peripheral IV - Single Lumen 07/25/22 0615 20 G Right Antecubital 1 day                    Significant Labs:    CBC/Anemia Profile:  Recent Labs   Lab 07/25/22  0621 07/26/22  0502   WBC 10.13 9.08   HGB 10.1* 10.6*   HCT 30.1* 31.6*   * 159   MCV 91 90   RDW 15.7* 15.5*          Chemistries:  Recent Labs   Lab 07/25/22  1635 07/25/22  2213 07/26/22  0502   * 136 133*  135*   K 4.0 3.9 3.7  3.8   CL 96 97 97  97   CO2 27 27 25  26   BUN 21 29* 33*  33*   CREATININE 2.8* 3.5* 4.1*  4.1*   CALCIUM 8.5* 8.5* 8.7  8.5*   ALBUMIN 2.9* 2.9* 2.8*   MG 2.4 2.5 2.6   PHOS 1.6* 1.6* 2.2*        [facility] Reference Range & Units 07/12/22 20:20  07/12/22 22:56 07/13/22 02:00   Troponin I 0.000 - 0.026 ng/mL >50.000 (H) >50.000 (H) >50.000 (H)   (H): Data is abnormally high   Latest Reference Range & Units 07/22/22 04:21   POC PH 7.35 - 7.45  7.476 (H)   POC PCO2 35 - 45 mmHg 31.0 (L)   POC PO2 80 - 100 mmHg 79 (L)   POC HCO3 24 - 28 mmol/L 22.9 (L)   POC SATURATED O2 95 - 100 % 97   POC BE -2 to 2 mmol/L -1   FiO2  30   Vt  375   PEEP  5   Sample  ARTERIAL   DelSys  Adult Vent   Allens Test  N/A   Site  Sofia/UAC   Mode  AC/PRVC   Rate  20     EKG 07/12/2022  Vent. Rate : 082 BPM     Atrial Rate : 082 BPM      P-R Int : 148 ms          QRS Dur : 084 ms       QT Int : 442 ms       P-R-T Axes : 070 030 -65 degrees      QTc Int : 516 ms     Normal sinus rhythm   LVH with repolarization abnormality ( Morgan product )   Prolonged QT   When compared with ECG of 12-JUL-2022 10:27,   ST less depressed in Inferior leads   T wave inversion now evident in Anterior leads       2D echo 07/12/2022    Concentric hypertrophy and normal systolic function.  The estimated ejection fraction is 55%.  Normal left ventricular diastolic function.  Normal right ventricular size with normal right ventricular systolic function.  Moderate tricuspid regurgitation.  There is pulmonary hypertension.              Significant Imaging:      Chest x-ray 07/22/2022    CLINICAL HISTORY:  Post-op; Atherosclerotic heart disease of native coronary artery without angina pectoris     TECHNIQUE:  Single frontal view of the chest was performed.     COMPARISON:  07/21/2022     FINDINGS:  Tubes and lines are stable.   In comparison to the prior study, there is no adverse interval changes

## 2022-07-26 NOTE — ASSESSMENT & PLAN NOTE
- per nephrology mgmt  - CRRT remains on at  goal net of zero  - monitor BMP  7/23 off CRRT.  Conventional HD as per Nephrology.  7/24 HD as per Nephrology.  7/25 on HD today.  Outpatient follow-up with Nephrology  7/26 HD 3 times weekly

## 2022-07-26 NOTE — ASSESSMENT & PLAN NOTE
- per CTS mgmt  - Day 2: s/p CABG to    1. pediciled LIMA to LAD   2. Saphenous vein graft to obtuse marginal  - pressors to maintain MAP >65, wean as clinically tolerated  - CT drainage stable  - Cont to monitor CBC  - ASA, plavix, BB, statin as tolerated   7/23 status post CABG x2.  Aspirin beta-blocker.   7/24 Aspirin Plavix beta-blocker  7/25  drains removed.  Outpatient CT surgery follow-up  7/26 aspirin beta-blocker Plavix

## 2022-07-26 NOTE — ASSESSMENT & PLAN NOTE
- see s/p CABG assessment & plan  7/23 status post CABG x2.  Aspirin beta-blocker  7/24 aspirin Plavix beta-blocker status post CABG x2  7/25 postop day 5. .  Aspirin beta-blocker Plavix  7/26 postop day 6. Aspirin beta-blocker Plavix.

## 2022-07-27 ENCOUNTER — TELEPHONE (OUTPATIENT)
Dept: CARDIOLOGY | Facility: HOSPITAL | Age: 77
End: 2022-07-27
Payer: MEDICARE

## 2022-07-27 VITALS
TEMPERATURE: 98 F | SYSTOLIC BLOOD PRESSURE: 140 MMHG | OXYGEN SATURATION: 97 % | WEIGHT: 138.25 LBS | RESPIRATION RATE: 16 BRPM | BODY MASS INDEX: 23.03 KG/M2 | DIASTOLIC BLOOD PRESSURE: 60 MMHG | HEART RATE: 79 BPM | HEIGHT: 65 IN

## 2022-07-27 LAB
ALBUMIN SERPL BCP-MCNC: 2.8 G/DL (ref 3.5–5.2)
ANION GAP SERPL CALC-SCNC: 10 MMOL/L (ref 8–16)
ANION GAP SERPL CALC-SCNC: 11 MMOL/L (ref 8–16)
BASOPHILS # BLD AUTO: 0.03 K/UL (ref 0–0.2)
BASOPHILS NFR BLD: 0.3 % (ref 0–1.9)
BUN SERPL-MCNC: 43 MG/DL (ref 8–23)
BUN SERPL-MCNC: 43 MG/DL (ref 8–23)
CALCIUM SERPL-MCNC: 8.5 MG/DL (ref 8.7–10.5)
CALCIUM SERPL-MCNC: 8.6 MG/DL (ref 8.7–10.5)
CHLORIDE SERPL-SCNC: 95 MMOL/L (ref 95–110)
CHLORIDE SERPL-SCNC: 95 MMOL/L (ref 95–110)
CO2 SERPL-SCNC: 27 MMOL/L (ref 23–29)
CO2 SERPL-SCNC: 28 MMOL/L (ref 23–29)
CREAT SERPL-MCNC: 5.3 MG/DL (ref 0.5–1.4)
CREAT SERPL-MCNC: 5.3 MG/DL (ref 0.5–1.4)
DIFFERENTIAL METHOD: ABNORMAL
EOSINOPHIL # BLD AUTO: 0.1 K/UL (ref 0–0.5)
EOSINOPHIL NFR BLD: 1 % (ref 0–8)
ERYTHROCYTE [DISTWIDTH] IN BLOOD BY AUTOMATED COUNT: 15.6 % (ref 11.5–14.5)
EST. GFR  (AFRICAN AMERICAN): 8 ML/MIN/1.73 M^2
EST. GFR  (AFRICAN AMERICAN): 8 ML/MIN/1.73 M^2
EST. GFR  (NON AFRICAN AMERICAN): 7 ML/MIN/1.73 M^2
EST. GFR  (NON AFRICAN AMERICAN): 7 ML/MIN/1.73 M^2
GLUCOSE SERPL-MCNC: 70 MG/DL (ref 70–110)
GLUCOSE SERPL-MCNC: 71 MG/DL (ref 70–110)
HCT VFR BLD AUTO: 30 % (ref 37–48.5)
HGB BLD-MCNC: 10.2 G/DL (ref 12–16)
IMM GRANULOCYTES # BLD AUTO: 0.1 K/UL (ref 0–0.04)
IMM GRANULOCYTES NFR BLD AUTO: 1 % (ref 0–0.5)
LYMPHOCYTES # BLD AUTO: 1 K/UL (ref 1–4.8)
LYMPHOCYTES NFR BLD: 10.4 % (ref 18–48)
MAGNESIUM SERPL-MCNC: 3 MG/DL (ref 1.6–2.6)
MAGNESIUM SERPL-MCNC: 3 MG/DL (ref 1.6–2.6)
MCH RBC QN AUTO: 30.8 PG (ref 27–31)
MCHC RBC AUTO-ENTMCNC: 34 G/DL (ref 32–36)
MCV RBC AUTO: 91 FL (ref 82–98)
MONOCYTES # BLD AUTO: 1 K/UL (ref 0.3–1)
MONOCYTES NFR BLD: 9.9 % (ref 4–15)
NEUTROPHILS # BLD AUTO: 7.7 K/UL (ref 1.8–7.7)
NEUTROPHILS NFR BLD: 77.4 % (ref 38–73)
NRBC BLD-RTO: 0 /100 WBC
PHOSPHATE SERPL-MCNC: 2.3 MG/DL (ref 2.7–4.5)
PHOSPHATE SERPL-MCNC: 2.3 MG/DL (ref 2.7–4.5)
PLATELET # BLD AUTO: 173 K/UL (ref 150–450)
PMV BLD AUTO: 9.2 FL (ref 9.2–12.9)
POCT GLUCOSE: 133 MG/DL (ref 70–110)
POTASSIUM SERPL-SCNC: 4.2 MMOL/L (ref 3.5–5.1)
POTASSIUM SERPL-SCNC: 4.2 MMOL/L (ref 3.5–5.1)
RBC # BLD AUTO: 3.31 M/UL (ref 4–5.4)
SODIUM SERPL-SCNC: 133 MMOL/L (ref 136–145)
SODIUM SERPL-SCNC: 133 MMOL/L (ref 136–145)
WBC # BLD AUTO: 9.94 K/UL (ref 3.9–12.7)

## 2022-07-27 PROCEDURE — 99233 SBSQ HOSP IP/OBS HIGH 50: CPT | Mod: ,,, | Performed by: INTERNAL MEDICINE

## 2022-07-27 PROCEDURE — 36415 COLL VENOUS BLD VENIPUNCTURE: CPT | Performed by: INTERNAL MEDICINE

## 2022-07-27 PROCEDURE — 97116 GAIT TRAINING THERAPY: CPT

## 2022-07-27 PROCEDURE — 94761 N-INVAS EAR/PLS OXIMETRY MLT: CPT

## 2022-07-27 PROCEDURE — 80100016 HC MAINTENANCE HEMODIALYSIS

## 2022-07-27 PROCEDURE — 80069 RENAL FUNCTION PANEL: CPT | Performed by: INTERNAL MEDICINE

## 2022-07-27 PROCEDURE — 99232 SBSQ HOSP IP/OBS MODERATE 35: CPT | Mod: ,,, | Performed by: INTERNAL MEDICINE

## 2022-07-27 PROCEDURE — 85025 COMPLETE CBC W/AUTO DIFF WBC: CPT | Performed by: THORACIC SURGERY (CARDIOTHORACIC VASCULAR SURGERY)

## 2022-07-27 PROCEDURE — 83735 ASSAY OF MAGNESIUM: CPT | Mod: 91 | Performed by: INTERNAL MEDICINE

## 2022-07-27 PROCEDURE — 84100 ASSAY OF PHOSPHORUS: CPT | Performed by: INTERNAL MEDICINE

## 2022-07-27 PROCEDURE — 80048 BASIC METABOLIC PNL TOTAL CA: CPT | Performed by: THORACIC SURGERY (CARDIOTHORACIC VASCULAR SURGERY)

## 2022-07-27 PROCEDURE — 99232 PR SUBSEQUENT HOSPITAL CARE,LEVL II: ICD-10-PCS | Mod: ,,, | Performed by: INTERNAL MEDICINE

## 2022-07-27 PROCEDURE — 97530 THERAPEUTIC ACTIVITIES: CPT

## 2022-07-27 PROCEDURE — 99233 PR SUBSEQUENT HOSPITAL CARE,LEVL III: ICD-10-PCS | Mod: ,,, | Performed by: INTERNAL MEDICINE

## 2022-07-27 PROCEDURE — 25000003 PHARM REV CODE 250: Performed by: THORACIC SURGERY (CARDIOTHORACIC VASCULAR SURGERY)

## 2022-07-27 PROCEDURE — 83735 ASSAY OF MAGNESIUM: CPT | Performed by: THORACIC SURGERY (CARDIOTHORACIC VASCULAR SURGERY)

## 2022-07-27 RX ORDER — GLUCAGON 1 MG
1 KIT INJECTION
Status: DISCONTINUED | OUTPATIENT
Start: 2022-07-28 | End: 2022-07-27 | Stop reason: HOSPADM

## 2022-07-27 RX ORDER — IBUPROFEN 200 MG
16 TABLET ORAL
Status: DISCONTINUED | OUTPATIENT
Start: 2022-07-28 | End: 2022-07-27 | Stop reason: HOSPADM

## 2022-07-27 RX ORDER — IBUPROFEN 200 MG
24 TABLET ORAL
Status: DISCONTINUED | OUTPATIENT
Start: 2022-07-28 | End: 2022-07-27 | Stop reason: HOSPADM

## 2022-07-27 RX ORDER — TRAMADOL HYDROCHLORIDE 50 MG/1
50 TABLET ORAL EVERY 8 HOURS PRN
Qty: 10 TABLET | Refills: 0 | Status: SHIPPED | OUTPATIENT
Start: 2022-07-27 | End: 2022-10-04

## 2022-07-27 RX ADMIN — CHLORHEXIDINE GLUCONATE 0.12% ORAL RINSE 10 ML: 1.2 LIQUID ORAL at 08:07

## 2022-07-27 RX ADMIN — FOLIC ACID 1 MG: 1 TABLET ORAL at 08:07

## 2022-07-27 RX ADMIN — INSULIN DETEMIR 5 UNITS: 100 INJECTION, SOLUTION SUBCUTANEOUS at 08:07

## 2022-07-27 RX ADMIN — NEPHROCAP 1 CAPSULE: 1 CAP ORAL at 08:07

## 2022-07-27 RX ADMIN — FAMOTIDINE 20 MG: 20 TABLET ORAL at 08:07

## 2022-07-27 RX ADMIN — CYANOCOBALAMIN TAB 1000 MCG 1000 MCG: 1000 TAB at 08:07

## 2022-07-27 RX ADMIN — CLOPIDOGREL 75 MG: 75 TABLET, FILM COATED ORAL at 08:07

## 2022-07-27 RX ADMIN — ASPIRIN 81 MG CHEWABLE TABLET 81 MG: 81 TABLET CHEWABLE at 08:07

## 2022-07-27 RX ADMIN — OXYCODONE HYDROCHLORIDE AND ACETAMINOPHEN 500 MG: 500 TABLET ORAL at 08:07

## 2022-07-27 RX ADMIN — METOPROLOL TARTRATE 25 MG: 25 TABLET, FILM COATED ORAL at 08:07

## 2022-07-27 NOTE — PT/OT/SLP PROGRESS
Occupational Therapy   Treatment    Name: Avril Monzon  MRN: [id number]  Admitting Diagnosis:  S/P CABG (coronary artery bypass graft)  7 Days Post-Op    Recommendations:     Discharge Recommendations: home health OT  Discharge Equipment Recommendations:  bath bench  Barriers to discharge:  None    Assessment:     Avril Monzon is a 77 y.o. female with a medical diagnosis of S/P CABG (coronary artery bypass graft).  She presents with the following performance deficits affecting function are weakness, impaired endurance, impaired self care skills, impaired functional mobility, impaired balance, impaired cardiopulmonary response to activity, decreased safety awareness.     Rehab Prognosis:  Good; patient would benefit from acute skilled OT services to address these deficits and reach maximum level of function.       Plan:     Patient to be seen 2 x/week to address the above listed problems via self-care/home management, therapeutic activities, therapeutic exercises  · Plan of Care Expires: 08/05/22  · Plan of Care Reviewed with: patient    Subjective     Pain/Comfort:  · Pain Rating 1: 0/10    Objective:     Communicated with: NurseJeremie, prior to session.  Patient found supine with peripheral IV, telemetry upon OT entry to room.    General Precautions: Standard, fall, sternal   Orthopedic Precautions:N/A   Braces: N/A  Respiratory Status: Room air    Bed Mobility:    · Patient completed Supine to Sit with stand by assistance     Functional Mobility/Transfers:  · Patient completed Sit <> Stand Transfer with contact guard assistance  with  no assistive device   · Patient completed Bed <> Chair Transfer using Step Transfer technique with contact guard assistance with no assistive device  · Functional Mobility: Patient completed x80ft x2 trials functional mobility without AD and min A to increase dynamic standing balance and activity tolerance needed for ADL completion.  · Noted to have numerous small LOB with onset  of fatigue with functional mobility requiring min A to correct.    [facility] 6 Click ADL: 20    Treatment & Education:  Patient tolerated intervention well overall. Mild dyspnea on exertion throughout. Patient educated on energy conservation and activity pacing techniques to use to decrease risk of falling and over exertion. Reviewed sternal precautions and their functional implications. Encouraged to remain OOB in chair x2 hours, minimum, and to complete B UE AROM therex within sternal precautions to increase functional strength and activity tolerance. Stated understanding and in agreement with POC.    Patient left up in chair with all lines intact and call button in reachEducation:      GOALS:   Multidisciplinary Problems     Occupational Therapy Goals        Problem: Occupational Therapy    Goal Priority Disciplines Outcome Interventions   Occupational Therapy Goal     OT, PT/OT Ongoing, Progressing    Description: O.T. GOALS TO BE MET BY 8-5-22  PT WILL REQ MIN A WITH SUPINE<SIT TRANSFERS  PT WILL TOLERATE 1 SET X  5 GENTLE PROM EXERCISE WITHIN STERNAL PRECAUTIONS  MIN A WITH BSC TRANSFERS  PT WILL BE ABLE TO VERBALIZED STERNAL PRECAUTIONS AND RETURNED DEMONSTRATION DURING THERAPEUTIC ACTIVITY                   Time Tracking:     OT Date of Treatment: 07/27/22  OT Start Time: 0900  OT Stop Time: 0925  OT Total Time (min): 25 min    Billable Minutes:Therapeutic Activity 25    7/27/2022

## 2022-07-27 NOTE — PHYSICIAN QUERY
PT Name: Avril Monzon  MR #: [id number]  DOCUMENTATION CLARIFICATION      CDS/: Lauren Quintero RN, CCDS              Contact information: [email or web address]    This form is a permanent document in the medical record.      Query Date: July 27, 2022    By submitting this query, we are merely seeking further clarification of documentation. Please utilize your independent clinical judgment when addressing the question(s) below.    The Medical Record contains the following:   Indicators  Supporting Clinical Findings Location in Medical Record   X PT        INR        PTT PT-14.3-->13.5  INR 1.4-->1.3  PTT > 150, -->39.8   7/20, 7/21 lab    Platelets     X Coagulopathy or Coagulation Defect documented The patient remained coagulopathic and hypo thermic overnight for which she was treated with FFP cryo and also packed cells     Coagulopathy resolved 7/21 prog note        7/22 prog note   X Acute/Chronic Illness NSTEMI,   Coronary artery bypass graft, x 2 7/20 op note   X Treatment   Orders received to stop heparin on CRRT & to administer 1 u Cryo at this time    Orders received for coagulation labs and to infuse 1 unit of FFP.     Orders received for 1 FFP (2nd dose) and 1 unit PRBC 7/20 nurse note      7/20 nurse note      7/20 nurse note   X Other Heparin 26,000 units IV 7/20 anesthesia FS     Provider, please specify Coagulopathy. (Derek all that apply)  [   ] Abnormal INR/Coagulation Profile   [   X] Coagulation Defect due to (please specify):__Low fibrinogen level_______   [   ] Coagulopathy Secondary to Anticoagulation Therapy   [   ] Coagulation deficiency unspecified   [   ] Other hematological diagnosis (please specify):_______   [  ] Clinically Undetermined         Please document in your progress notes daily for the duration of treatment until resolved, and include in your discharge summary.

## 2022-07-27 NOTE — ASSESSMENT & PLAN NOTE
-Continue current post-op care and mgmt as per CTS  -H/H dipped to 4.3/12.0, being transfused  -Wean pressors as tolerated  -ASA, Plavix, BB, statin as condition permits    7/22/22  -Recovering post CABG x 2  -H/H stable this AM   -Resume ASA, Plavix as per CTS  -Continue BB, statin  -IS usage and ambulation when able    7/23/2022   PROGRESSING WELL   IS USE ONGOING WILL HOPE TO GET TUBES OUT    CONTINUE  BB ASA STATINS    7/24/2022 STABLE HEMODYNAMICALLY NO ARRYTHMIAS CONTINUE SAME.    7/25/22  -Continues to improve  -Continue OMT  -IS usage and ambulation    7/26/22  -Progressing well  -Continue OMT  -IS usage and ambulation    7/27/22  -Stable overnight, no CV issues  -Continue OMT as tolerated  -IS usage and ambulation  -Follow-up in clinic

## 2022-07-27 NOTE — DISCHARGE SUMMARY
[facility] - Tele[facility] ([facility])  Cardiology  Discharge Summary    Patient Name: Avril Monzon  MRN: [id number]  Admission Date: 7/12/2022  Code Status: Full Code   Attending Provider: Sanju Locke MD  Primary Care Physician: Rose Parks MD  Principal Problem:S/P CABG (coronary artery bypass graft)    Patient information was obtained from patient, past medical records, ER records and primary team.     Subjective:     Chief Complaint:  NSTEMI     HPI:  77-year-old female who was admitted with the NSTEMI had i left heart catheterization which revealed 3 vessel coronary artery disease.  Patient has previous PCI to her proximal LAD and she has the diabetic hypertension hyperlipidemia end-stage renal disease on dialysis.  She also had pulmonary hypertension with EF of 40% global hypokinesis he moderate mitral regurgitation preop.  She was chronically anemic and history of smoking in the past she has diabetic retinopathy.  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  The patient had multiple dialysis she was also dated for the Plavix washout before she was taken up for surgery.  The patient had CABG x2 on a beating heart pump assisted coronary artery bypass grafting in the postoperative.  The patient had pulmonary hypertension with right heart failure for which she was assisted with nitric oxide inhalation she came to the ICU and was started on her planned CRRT.  The patient remained coagulopathic and hypo thermic overnight for which she was treated with FFP cryo and also packed cells she is on epinephrine Primacor and intermittent vasopressin she is awake and follows simple commands and this morning her nitric oxide is being weaned off.  7/22/22 the patient nitric oxide has been weaned off she remained hemodynamically stable had AP is downto0 4and a Primacor is 0.2 she is awake  following commands  07/23/2022 patient was extubated of nitric oxide sitting up in a chair hemodynamically stable off inotropes her CRRT was stopped yesterday plan is to get dialysis today .  The chest tube output has considerably slowed down serous drainage.  07/24/2022 the patient was extubated yesterday and she got hemodialyzed she is tolerating regular diet she will control has been fair No pain issues good motivation  07/25/2022 the patient had a quiet night overnight no major issues she is sitting up in a chair eating breakfast this morning  Still in place will DC that this morning she has floor  \ arteries to go to.  07/26/2022 the patient had at comfortable overnight no acute issues she does complain of toe discoloration on the right side.  07/27/2022 the patient had uneventful night no pain issues she was stable hemodynamically the incisions are looking good.    Past Medical History:   Diagnosis Date    Acute hypoxemic respiratory failure 11/26/2018    Anemia     Arthritis     back, hand, knees    Back pain     Cataract     CKD stage G4/A3, GFR 15 - 29 and albumin creatinine ratio >300 mg/g     GFR 40% Jan 2014 and 33% ub 3/2014 ([facility])    Coronary artery disease involving native coronary artery of native heart 11/30/2018    Diabetic retinopathy     DM (diabetes mellitus) type II controlled, neurological manifestation     Exudative age-related macular degeneration of left eye with active choroidal neovascularization 2/5/2019    GERD (gastroesophageal reflux disease)     Glaucoma     Heart failure     Hyperlipidemia     Hypertension     Non-ST elevation MI (NSTEMI) 11/28/2018    NSTEMI (non-ST elevated myocardial infarction) 11/27/2018    Peripheral neuropathy     Polyneuropathy     Proteinuria     >6 mo     Seizures     [facility] 1/2014 -dick CT NRI showed small vessel    Stroke 2012,2014    Tobacco dependence     Type 2 diabetes with peripheral circulatory disorder, controlled        Past  Surgical History:   Procedure Laterality Date    BREAST LUMPECTOMY Left     benign, when patient was 15 years old    BREAST MASS EXCISION      ,benign, age 15.    CATHETERIZATION OF BOTH LEFT AND RIGHT HEART N/A 11/28/2018    Procedure: CATHETERIZATION, HEART, BOTH LEFT AND RIGHT;  Surgeon: Michelle Holman MD;  Location: [facility] CATH LAB;  Service: Cardiology;  Laterality: N/A;    CATHETERIZATION OF BOTH LEFT AND RIGHT HEART N/A 11/30/2018    Procedure: CATHETERIZATION, HEART, BOTH LEFT AND RIGHT;  Surgeon: Michelle Holman MD;  Location: [facility] CATH LAB;  Service: Cardiology;  Laterality: N/A;    CORONARY ARTERY BYPASS GRAFT (CABG) N/A 7/20/2022    Procedure: CORONARY ARTERY BYPASS GRAFT (CABG);  Surgeon: Sanju Locke MD;  Location: [facility] OR;  Service: Cardiothoracic;  Laterality: N/A;  2-VESSEL PUMP ASSIST WITH EPI-AORTIC ULTRASOUND    ENDOSCOPIC HARVEST OF VEIN Left 7/20/2022    Procedure: SURGICAL PROCUREMENT, VEIN, ENDOSCOPIC;  Surgeon: Sanju Locke MD;  Location: [facility] OR;  Service: Cardiothoracic;  Laterality: Left;    HYSTERECTOMY  1982    INJECTION OF ANESTHETIC AGENT AROUND MULTIPLE INTERCOSTAL NERVES N/A 7/20/2022    Procedure: BLOCK, NERVE, INTERCOSTAL, 2 OR MORE;  Surgeon: Sanju Locke MD;  Location: [facility] OR;  Service: Cardiothoracic;  Laterality: N/A;  PARASTERNAL NERVE BLOCK    INSERTION OF SHUNT      LEFT HEART CATHETERIZATION Left 12/3/2018    Procedure: CATHETERIZATION, HEART, LEFT;  Surgeon: Michelle Holman MD;  Location: [facility] CATH LAB;  Service: Cardiology;  Laterality: Left;  LAD ATHERECTOMY STENT/ FEMORAL APPROACH    LEFT HEART CATHETERIZATION Left 7/13/2022    Procedure: CATHETERIZATION, HEART, LEFT;  Surgeon: Abhi Sloan MD;  Location: [facility] CATH LAB;  Service: Cardiology;  Laterality: Left;    OOPHORECTOMY      wrist cyst Right 1980s    dorsal wrist cyst       Review of patient's allergies indicates:   Allergen Reactions    Codeine Swelling    Darvon  [propoxyphene] Swelling    Atorvastatin      Muscle twitching       No current facility-administered medications on file prior to encounter.     Current Outpatient Medications on File Prior to Encounter   Medication Sig    aspirin (ECOTRIN) 81 MG EC tablet Take 1 tablet (81 mg total) by mouth once daily.    azelastine (ASTELIN) 137 mcg (0.1 %) nasal spray SMARTSI Spray(s) Both Nares Twice Daily PRN    benzonatate (TESSALON) 200 MG capsule Take 200 mg by mouth 3 (three) times daily.    clopidogreL (PLAVIX) 75 mg tablet Take 1 tablet (75 mg total) by mouth once daily.    insulin NPH/Reg human (HUMULIN, 70/30,) 100 unit/mL (70-30) InPn pen To do 28 units subq in AM and 8 units subq in PM.  Dose to be done within 15 min before or after meal. (Patient taking differently: To do 28 units subq in AM and 8 units subq in PM.  Dose to be done within 15 min before or after meal.)    metoprolol tartrate (LOPRESSOR) 50 MG tablet Take 1 tablet (50 mg total) by mouth 2 (two) times daily.    semaglutide (OZEMPIC) 0.25 mg or 0.5 mg(2 mg/1.5 mL) pen injector Inject 0.25 mg into the skin every 7 days.    telmisartan (MICARDIS) 20 MG Tab Take 1 tablet (20 mg total) by mouth once daily.    [DISCONTINUED] isosorbide-hydrALAZINE 20-37.5 mg (BIDIL) 20-37.5 mg Tab Take 1 tablet by mouth 3 (three) times daily.    ACCU-CHEK ARDEN PLUS METER Misc USE TO TEST TWICE DAILY    blood sugar diagnostic Strp To check BG 2 times daily, to use with insurance preferred meter    cyanocobalamin (VITAMIN B-12) 1000 MCG tablet Take 100 mcg by mouth once daily.    folic acid (FOLVITE) 1 MG tablet Take 1 mg by mouth once daily.    lancets Misc To check BG 2 times daily, to use with insurance preferred meter    triamcinolone acetonide 0.1% (KENALOG) 0.1 % cream Apply topically 2 (two) times daily.     Family History     Problem Relation (Age of Onset)    Cancer Maternal Grandmother    Diabetes Mother    Heart disease Mother    Hyperlipidemia  Mother    Hypertension Mother    Muscular dystrophy Son        Tobacco Use    Smoking status: Former Smoker     Packs/day: 1.50     Years: 30.00     Pack years: 45.00     Types: Cigarettes     Quit date: 1990     Years since quittin.5    Smokeless tobacco: Former User   Substance and Sexual Activity    Alcohol use: No     Alcohol/week: 0.0 standard drinks    Drug use: No    Sexual activity: Not Currently     Review of Systems   Constitutional: Positive for decreased appetite.   HENT: Negative.    Eyes: Negative.    Respiratory: Negative.    Endocrine: Negative.    Hematologic/Lymphatic: Negative.    Skin: Negative.    Musculoskeletal: Negative.    Psychiatric/Behavioral: Negative.    Allergic/Immunologic: Negative.      Objective:     Vital Signs (Most Recent):  Temp: 98.2 °F (36.8 °C) (22 0800)  Pulse: 73 (22 0800)  Resp: 17 (22 0800)  BP: (!) 145/67 (22 08)  SpO2: 95 % (22 08) Vital Signs (24h Range):  Temp:  [97.5 °F (36.4 °C)-98.7 °F (37.1 °C)] 98.2 °F (36.8 °C)  Pulse:  [70-75] 73  Resp:  [16-20] 17  SpO2:  [94 %-98 %] 95 %  BP: (116-171)/(58-72) 145/67     Weight: 62.7 kg (138 lb 3.7 oz)  Body mass index is 23 kg/m².    SpO2: 95 %  O2 Device (Oxygen Therapy): room air      Intake/Output Summary (Last 24 hours) at 2022 1002  Last data filed at 2022 0800  Gross per 24 hour   Intake 240 ml   Output --   Net 240 ml       Lines/Drains/Airways     Drain  Duration                Hemodialysis AV Fistula 20 2203 Left upper arm 846 days          Peripheral Intravenous Line  Duration                Peripheral IV - Single Lumen 22 0625 20 G Posterior;Right Wrist 9 days         Peripheral IV - Single Lumen 22 0615 20 G Right Antecubital 2 days                Physical Exam  Vitals reviewed.   Constitutional:       Appearance: Normal appearance.      Comments: Chest incision is healing well and sternum is stable   HENT:      Head: Normocephalic.       Mouth/Throat:      Mouth: Mucous membranes are moist.   Eyes:      Extraocular Movements: Extraocular movements intact.      Pupils: Pupils are equal, round, and reactive to light.   Pulmonary:      Effort: Pulmonary effort is normal.   Abdominal:      Palpations: Abdomen is soft.      Tenderness: There is no abdominal tenderness. There is no guarding.   Musculoskeletal:         General: Normal range of motion.      Cervical back: Neck supple.   Skin:     General: Skin is warm.      Capillary Refill: Capillary refill takes less than 2 seconds.   Neurological:      General: No focal deficit present.      Mental Status: She is alert and oriented to person, place, and time.   Psychiatric:         Mood and Affect: Mood normal.         Significant Labs:   BMP:   Recent Labs   Lab 07/26/22  1519 07/26/22  2214 07/27/22  0433   * 65* 71  70   * 135* 133*  133*   K 4.8 4.1 4.2  4.2   CL 95 95 95  95   CO2 27 27 28  27   BUN 35* 40* 43*  43*   CREATININE 4.7* 4.9* 5.3*  5.3*   CALCIUM 8.9 8.7 8.5*  8.6*   MG 3.0* 3.0* 3.0*  3.0*    and CBC   Recent Labs   Lab 07/26/22  0502 07/27/22  0433   WBC 9.08 9.94   HGB 10.6* 10.2*   HCT 31.6* 30.0*    173       Significant Imaging: Echocardiogram:     2D echo with color flow doppler:   Results for orders placed or performed during the hospital encounter of 11/26/18   2D echo with color flow doppler   Result Value Ref Range    EF + QEF 45 55 - 65    Mitral Valve Regurgitation MILD TO MODERATE     Diastolic Dysfunction Yes (A)     Est. PA Systolic Pressure 69.15 (A)     Tricuspid Valve Regurgitation MILD     Narrative    Date of Procedure: 11/27/2018        TEST DESCRIPTION   Technical Quality: This is a portable study performed at the patient's bedside. This is a technically challenging study. There is poor endocardial definition.     Aorta: The aortic root is normal in size, measuring 2.6 cm at sinotubular junction and 2.6 cm at Sinuses of Valsalva. The  proximal ascending aorta is normal in size, measuring 2.6 cm across.     Left Atrium: The left atrial volume index is normal, measuring 31.92 cc/m2.     Left Ventricle: The left ventricle is normal in size, with an end-diastolic diameter of 4.6 cm, and an end-systolic diameter of 3.2 cm. LV wall thickness is normal, with the septum measuring 1.0 cm and the posterior wall measuring 0.9 cm across. Relative   wall thickness was normal at 0.39, and the LV mass index was 92.3 g/m2 consistent with normal left ventricular mass. The following segments were akinetic: apical septum, apical inferior wall.  The following segments were moderately hypokinetic: mid inferoseptum.  The following segments were severely hypokinetic: mid anteroseptum.  Left ventricular systolic function appears mildly depressed. Visually estimated ejection fraction is 45-50%. The LV Doppler derived stroke volume equals 99.0 ccs.     Diastolic indices: E wave velocity 1.2 m/s, E/A ratio 1.3,  msec., E/e' ratio(avg) 13. There is diastolic dysfunction secondary to relaxation abnormality.     Right Atrium: The right atrium is normal in size, measuring 4.0 cm in length and 3.0 cm in width in the apical view.     Right Ventricle: The right ventricle is normal in size. Global right ventricular systolic function appears normal. Tricuspid annular plane systolic excursion (TAPSE) is 2.0 cm. The estimated PA systolic pressure is 69 mmHg.     Aortic Valve:  The peak gradient obtained across the aortic valve is 13 mmHg, with a mean gradient of 7 mmHg. Using a left ventricular outflow tract diameter of 2.0 cm, a left ventricular outflow tract velocity time integral of 33 cm, and a peak   instantaneous transvalvular velocity time integral of 34 cm, the calculated aortic valve area is 2.91 cm2(AVAi is 1.57 cm2/m2).     Mitral Valve:  The pressure half time is 42 msec. The calculated mitral valve area is 5.24 cm2. There is mild to moderate mitral regurgitation.      Tricuspid Valve:  There is mild tricuspid regurgitation.     IVC: IVC is enlarged but collapses > 50% with a sniff, suggesting intermediate right atrial pressure of 8 mmHg.     Atrial Septum: The atrial septum is intact.     Intracavitary: There is no evidence of pericardial effusion, intracavity mass, thrombi, or vegetation.     Other: There is a right pleural effusion present.         CONCLUSIONS     1 - Wall motion abnormalities.     2 - Mildly depressed left ventricular systolic function (EF 45-50%).     3 - Impaired LV relaxation, normal LAP (grade 1 diastolic dysfunction).     4 - Normal right ventricular systolic function .     5 - Pulmonary hypertension. The estimated PA systolic pressure is 69 mmHg.     6 - Mild to moderate mitral regurgitation.     7 - Mild tricuspid regurgitation.     8 - Intermediate central venous pressure.     9 - Right pleural effusion.             This document has been electronically    SIGNED BY: Michelle Holman MD On: 11/27/2018 10:58    This document was originally electronically signed on: 11/27/2018 10:57     Assessment and Plan:     Active Diagnoses:    Diagnosis Date Noted POA    PRINCIPAL PROBLEM:  S/P CABG (coronary artery bypass graft) [Z95.1] 07/20/2022 Not Applicable    NSTEMI (non-ST elevated myocardial infarction) [I21.4] 07/12/2022 Yes    Metabolic acidosis [E87.2] 07/12/2022 Yes    CAD (coronary artery disease) [I25.10] 11/30/2018 Yes    Pulmonary hypertension [I27.20] 11/28/2018 Yes    ESRD (end stage renal disease) [N18.6]  Yes    Hypertension associated with diabetes [E11.59, I15.2]  Yes     Chronic      Problems Resolved During this Admission:    Diagnosis Date Noted Date Resolved POA    On mechanically assisted ventilation [Z99.11] 07/20/2022 07/25/2022 Not Applicable    DKA (diabetic ketoacidosis) [E11.10] 07/12/2022 07/17/2022 Yes    Hyperkalemia [E87.5] 07/12/2022 07/14/2022 Yes       VTE Risk Mitigation (From admission, onward)         Ordered      IP VTE HIGH RISK PATIENT  Once         07/24/22 1032     Place sequential compression device  Until discontinued         07/24/22 1032     heparin (porcine) injection 4,000 Units  As needed (PRN)         07/20/22 1632     Place sequential compression device  Until discontinued         07/19/22 1700                Sanju Locke MD  Cardiology   [facility] - Telemetry ([facility])

## 2022-07-27 NOTE — PROGRESS NOTES
[facility] Tele[facility] ([facility])  Cardiology  Progress Note    Patient Name: Avril Monzon  MRN: [id number]  Admission Date: 7/12/2022  Hospital Length of Stay: 15 days  Code Status: Full Code   Attending Physician: Sanju Locke MD   Primary Care Physician: Rose Parks MD  Expected Discharge Date: 7/27/2022  Principal Problem:S/P CABG (coronary artery bypass graft)    Subjective:   HPI:  77 y.o. female patient with a PMHx of CKD, CAD s/p cardiac stenting, DM2, heart failure, HLD, HTN, NSTEMI, seizure, tobacco dependence, and CVA presents with NSTEMI and CHF.  EKG NSR, ST depression multiple leads. Initial troponin is 12. Pt being dialyzed today.       Hospital Course:   7/14/2022--Patient seen and examined in room, lying in bed. HD in progress. Denies any cardiac complaints today. Awaiting CABG on Monday.     7/15/2022--patient seen and examined in room, denies any complaints on exam today.     7/17/22 No c/o on HD on exam. Patient denies CP, angina or anginal equivalent. CABG tomorrow. Start statin after cabg, continue b blockers    7/18/2022 stable no chest pain . Cabg on hold due equipment need for off pump bypass    7/21/22-Patient seen and examined today, s/p CABG x 2. Increased OP from chest tubes overnight, H/H dipped to 4.3/12.0, being transfused. On pressor support, undergoing CRRT.     7/22/22-Patient seen and examined today, recovering s/p CABG x 2. Extubated, remains weak, complains of nausea and incisional pain. Pressors being weaned. Labs reviewed. H/H 8.9/25.2. Platelets 109,000.    7/23/2022 UP IN CHAIR TODAY NO PAIN NEEDING HEMODIALYSIS. STILL HAS DRAINAGE FROM CHEST TUBE NON FROM MEDIASTINAL TUBE. SHE IS PAIN FREE ALERT ORIENTED. NO ARRYTHMIAS.     7/24/2022 TOLERATED DIALYSIS WELL FEELS WELL NO CHEST PAIN NO ARRYTHMIAS CHEST TUIBE REMOVED.    7/25/22-Patient seen and examined today, sitting up in bed during dialysis. Stable. No CV complaints. Denies chest pain/SOB. Labs stable.      7/26/22-Patient seen and examined today, sitting up in bedside chair. Continues to progress. Worked with PT/OT earlier, ambulated well. No CV complaints. Labs stable. Likely d/c home tmw per CTS.    7/27/22-Patient seen and examined today, sitting up in bedside chair. Uneventful night. Stable CV wise, being discharged home today.          Review of Systems   Constitutional: Negative.   HENT: Negative.     Eyes: Negative.    Cardiovascular: Negative.    Respiratory: Negative.     Endocrine: Negative.    Hematologic/Lymphatic: Negative.    Skin: Negative.    Musculoskeletal: Negative.    Gastrointestinal: Negative.    Genitourinary: Negative.    Neurological: Negative.    Psychiatric/Behavioral: Negative.     Allergic/Immunologic: Negative.    Objective:     Vital Signs (Most Recent):  Temp: 99.1 °F (37.3 °C) (07/27/22 1112)  Pulse: 71 (07/27/22 1112)  Resp: 17 (07/27/22 1112)  BP: 130/62 (07/27/22 1112)  SpO2: 96 % (07/27/22 1112) Vital Signs (24h Range):  Temp:  [97.5 °F (36.4 °C)-99.1 °F (37.3 °C)] 99.1 °F (37.3 °C)  Pulse:  [70-75] 71  Resp:  [16-20] 17  SpO2:  [94 %-98 %] 96 %  BP: (128-171)/(58-72) 130/62     Weight: 62.7 kg (138 lb 3.7 oz)  Body mass index is 23 kg/m².     SpO2: 96 %  O2 Device (Oxygen Therapy): room air      Intake/Output Summary (Last 24 hours) at 7/27/2022 1301  Last data filed at 7/27/2022 1200  Gross per 24 hour   Intake 480 ml   Output --   Net 480 ml       Lines/Drains/Airways       Drain  Duration                  Hemodialysis AV Fistula 04/01/20 2203 Left upper arm 846 days              Peripheral Intravenous Line  Duration                  Peripheral IV - Single Lumen 07/18/22 0625 20 G Posterior;Right Wrist 9 days         Peripheral IV - Single Lumen 07/25/22 0615 20 G Right Antecubital 2 days                    Physical Exam  Vitals and nursing note reviewed.   Constitutional:       General: She is not in acute distress.     Appearance: Normal appearance. She is well-developed.  She is not diaphoretic.   HENT:      Head: Normocephalic and atraumatic.   Eyes:      General:         Right eye: No discharge.         Left eye: No discharge.      Pupils: Pupils are equal, round, and reactive to light.   Neck:      Thyroid: No thyromegaly.      Vascular: No JVD.      Trachea: No tracheal deviation.   Cardiovascular:      Rate and Rhythm: Normal rate and regular rhythm.      Heart sounds: S1 normal and S2 normal. No murmur heard.     Comments: Sternotomy site C/D/I; no bleeding erythema or drainage  Pulmonary:      Effort: Pulmonary effort is normal. No respiratory distress.      Breath sounds: Normal breath sounds. No wheezing or rales.   Abdominal:      General: There is no distension.      Tenderness: There is no rebound.   Musculoskeletal:      Cervical back: Neck supple.      Right lower leg: No edema.      Left lower leg: No edema.   Skin:     General: Skin is warm and dry.      Findings: No erythema.   Neurological:      General: No focal deficit present.      Mental Status: She is alert and oriented to person, place, and time.   Psychiatric:         Mood and Affect: Mood normal.         Behavior: Behavior normal.         Thought Content: Thought content normal.       Significant Labs: CMP   Recent Labs   Lab 07/26/22  1519 07/26/22  2214 07/27/22  0433   * 135* 133*  133*   K 4.8 4.1 4.2  4.2   CL 95 95 95  95   CO2 27 27 28  27   * 65* 71  70   BUN 35* 40* 43*  43*   CREATININE 4.7* 4.9* 5.3*  5.3*   CALCIUM 8.9 8.7 8.5*  8.6*   ALBUMIN 3.2* 3.1* 2.8*   ANIONGAP 13 13 10  11   ESTGFRAFRICA 10* 9* 8*  8*   EGFRNONAA 9* 8* 7*  7*   , CBC   Recent Labs   Lab 07/26/22  0502 07/27/22  0433   WBC 9.08 9.94   HGB 10.6* 10.2*   HCT 31.6* 30.0*    173   , Troponin No results for input(s): TROPONINI in the last 48 hours., and All pertinent lab results from the last 24 hours have been reviewed.    Significant Imaging: Echocardiogram: Transthoracic echo (TTE) complete  (Cupid Only):   Results for orders placed or performed during the hospital encounter of 07/12/22   Echo   Result Value Ref Range    BSA 1.68 m2    IVC diameter 1.93 cm    Left Ventricular Outflow Tract Mean Velocity 0.558812681008571 cm/s    Left Ventricular Outflow Tract Mean Gradient 1.94 mmHg    LVIDd 4.43 3.5 - 6.0 cm    IVS 1.15 (A) 0.6 - 1.1 cm    Posterior Wall 0.96 0.6 - 1.1 cm    Ao root annulus 2.78 cm    LVIDs 3.44 2.1 - 4.0 cm    FS 22 28 - 44 %    STJ 2.42 cm    Ascending aorta 2.32 cm    LV mass 161.00 g    LA size 3.85 cm    Left Ventricle Relative Wall Thickness 0.43 cm    AV mean gradient 4 mmHg    AV valve area 1.56 cm2    AV Velocity Ratio 0.79     AV index (prosthetic) 0.78     MV valve area p 1/2 method 6.15 cm2    E/A ratio 1.31     E wave deceleration time 123.676869426171400 msec    IVRT 39.677818817395687 msec    LVOT diameter 1.60 cm    LVOT area 2.0 cm2    LVOT peak lio 1.00 m/s    LVOT peak VTI 19.70 cm    Ao peak lio 1.26 m/s    Ao VTI 25.4 cm    RVOT peak lio 0.62 m/s    RVOT peak VTI 13.3 cm    Mr max lio 6.14 m/s    LVOT stroke volume 39.59 cm3    AV peak gradient 6 mmHg    PV mean gradient 0.85 mmHg    MV Peak E Lio 1.11 m/s    TR Max Lio 4.26 m/s    MV stenosis pressure 1/2 time 35.736119762343571 ms    MV Peak A Lio 0.85 m/s    LV Systolic Volume 48.80 mL    LV Systolic Volume Index 29.0 mL/m2    LV Diastolic Volume 88.87 mL    LV Diastolic Volume Index 52.90 mL/m2    LV Mass Index 96 g/m2    RA Major Axis 3.88 cm    Left Atrium Minor Axis 3.59 cm    Left Atrium Major Axis 4.08 cm    Triscuspid Valve Regurgitation Peak Gradient 73 mmHg    EF 55 %    Narrative    · Concentric hypertrophy and normal systolic function.  · The estimated ejection fraction is 55%.  · Normal left ventricular diastolic function.  · Normal right ventricular size with normal right ventricular systolic   function.  · Moderate tricuspid regurgitation.  · There is pulmonary hypertension.      , EKG: Reviewed,  and X-Ray: CXR: X-Ray Chest 1 View (CXR): No results found for this visit on 07/12/22. and X-Ray Chest PA and Lateral (CXR): No results found for this visit on 07/12/22.    Assessment and Plan:   Patient recovering well s/p CABG. Continue OMT as tolerated. Follow-up in clinic.    * S/P CABG (coronary artery bypass graft)  -Continue current post-op care and mgmt as per CTS  -H/H dipped to 4.3/12.0, being transfused  -Wean pressors as tolerated  -ASA, Plavix, BB, statin as condition permits    7/22/22  -Recovering post CABG x 2  -H/H stable this AM   -Resume ASA, Plavix as per CTS  -Continue BB, statin  -IS usage and ambulation when able    7/23/2022   PROGRESSING WELL   IS USE ONGOING WILL HOPE TO GET TUBES OUT    CONTINUE  BB ASA STATINS    7/24/2022 STABLE HEMODYNAMICALLY NO ARRYTHMIAS CONTINUE SAME.    7/25/22  -Continues to improve  -Continue OMT  -IS usage and ambulation    7/26/22  -Progressing well  -Continue OMT  -IS usage and ambulation    7/27/22  -Stable overnight, no CV issues  -Continue OMT as tolerated  -IS usage and ambulation  -Follow-up in clinic    NSTEMI (non-ST elevated myocardial infarction)  Plan for CABG on Monday per CTS  Continue ASA BB  Chest pain free    7/18/2022 chest pain free awaiting cabg    7/21/22  -See plan under CABG    7/23/2022   S/P LIMA TO LAD AND SVG TO OM  RCA OCCLUDED NON REVASCULARIZABLE DUE TO POOR TARGET.    Elevated troponin  77 y.o. female patient with a PMHx of CKD, CAD s/p cardiac stenting, DM2, heart failure, HLD, HTN, NSTEMI, seizure, tobacco dependence, and CVA presents with NSTEMI and CHF.  EKG NSR, ST depression multiple leads. Initial troponin is 12. Pt being dialyzed today.    Continue IV heparin, cardiac cath once CHF stable, being dialyzed today    CAD (coronary artery disease)  Has sevre cad 3 vessel low ef for cabg    7/23/2022  S/P 2 VESSEL CABG    Pulmonary hypertension  ASYMPTOMATIC     ESRD (end stage renal disease)  -Mgmt per  nephrology    7/23/2022  FOR DIALYSIS TODAY.    7/24/2022  TOLERATED DIALYSIS WELL.    Hypertension associated with diabetes  -Continue BB as BP permits        VTE Risk Mitigation (From admission, onward)         Ordered     IP VTE HIGH RISK PATIENT  Once         07/24/22 1032     Place sequential compression device  Until discontinued         07/24/22 1032     heparin (porcine) injection 4,000 Units  As needed (PRN)         07/20/22 1632     Place sequential compression device  Until discontinued         07/19/22 1700                Moriah Tran PA-C  Cardiology  [facility] - Telemetry ([facility])

## 2022-07-27 NOTE — SUBJECTIVE & OBJECTIVE
Review of Systems   Constitutional: Negative.   HENT: Negative.     Eyes: Negative.    Cardiovascular: Negative.    Respiratory: Negative.     Endocrine: Negative.    Hematologic/Lymphatic: Negative.    Skin: Negative.    Musculoskeletal: Negative.    Gastrointestinal: Negative.    Genitourinary: Negative.    Neurological: Negative.    Psychiatric/Behavioral: Negative.     Allergic/Immunologic: Negative.    Objective:     Vital Signs (Most Recent):  Temp: 99.1 °F (37.3 °C) (07/27/22 1112)  Pulse: 71 (07/27/22 1112)  Resp: 17 (07/27/22 1112)  BP: 130/62 (07/27/22 1112)  SpO2: 96 % (07/27/22 1112) Vital Signs (24h Range):  Temp:  [97.5 °F (36.4 °C)-99.1 °F (37.3 °C)] 99.1 °F (37.3 °C)  Pulse:  [70-75] 71  Resp:  [16-20] 17  SpO2:  [94 %-98 %] 96 %  BP: (128-171)/(58-72) 130/62     Weight: 62.7 kg (138 lb 3.7 oz)  Body mass index is 23 kg/m².     SpO2: 96 %  O2 Device (Oxygen Therapy): room air      Intake/Output Summary (Last 24 hours) at 7/27/2022 1301  Last data filed at 7/27/2022 1200  Gross per 24 hour   Intake 480 ml   Output --   Net 480 ml       Lines/Drains/Airways       Drain  Duration                  Hemodialysis AV Fistula 04/01/20 2203 Left upper arm 846 days              Peripheral Intravenous Line  Duration                  Peripheral IV - Single Lumen 07/18/22 0625 20 G Posterior;Right Wrist 9 days         Peripheral IV - Single Lumen 07/25/22 0615 20 G Right Antecubital 2 days                    Physical Exam  Vitals and nursing note reviewed.   Constitutional:       General: She is not in acute distress.     Appearance: Normal appearance. She is well-developed. She is not diaphoretic.   HENT:      Head: Normocephalic and atraumatic.   Eyes:      General:         Right eye: No discharge.         Left eye: No discharge.      Pupils: Pupils are equal, round, and reactive to light.   Neck:      Thyroid: No thyromegaly.      Vascular: No JVD.      Trachea: No tracheal deviation.   Cardiovascular:       Rate and Rhythm: Normal rate and regular rhythm.      Heart sounds: S1 normal and S2 normal. No murmur heard.     Comments: Sternotomy site C/D/I; no bleeding erythema or drainage  Pulmonary:      Effort: Pulmonary effort is normal. No respiratory distress.      Breath sounds: Normal breath sounds. No wheezing or rales.   Abdominal:      General: There is no distension.      Tenderness: There is no rebound.   Musculoskeletal:      Cervical back: Neck supple.      Right lower leg: No edema.      Left lower leg: No edema.   Skin:     General: Skin is warm and dry.      Findings: No erythema.   Neurological:      General: No focal deficit present.      Mental Status: She is alert and oriented to person, place, and time.   Psychiatric:         Mood and Affect: Mood normal.         Behavior: Behavior normal.         Thought Content: Thought content normal.       Significant Labs: CMP   Recent Labs   Lab 07/26/22  1519 07/26/22  2214 07/27/22  0433   * 135* 133*  133*   K 4.8 4.1 4.2  4.2   CL 95 95 95  95   CO2 27 27 28  27   * 65* 71  70   BUN 35* 40* 43*  43*   CREATININE 4.7* 4.9* 5.3*  5.3*   CALCIUM 8.9 8.7 8.5*  8.6*   ALBUMIN 3.2* 3.1* 2.8*   ANIONGAP 13 13 10  11   ESTGFRAFRICA 10* 9* 8*  8*   EGFRNONAA 9* 8* 7*  7*   , CBC   Recent Labs   Lab 07/26/22  0502 07/27/22  0433   WBC 9.08 9.94   HGB 10.6* 10.2*   HCT 31.6* 30.0*    173   , Troponin No results for input(s): TROPONINI in the last 48 hours., and All pertinent lab results from the last 24 hours have been reviewed.    Significant Imaging: Echocardiogram: Transthoracic echo (TTE) complete (Cupid Only):   Results for orders placed or performed during the hospital encounter of 07/12/22   Echo   Result Value Ref Range    BSA 1.68 m2    IVC diameter 1.93 cm    Left Ventricular Outflow Tract Mean Velocity 0.429437011694948 cm/s    Left Ventricular Outflow Tract Mean Gradient 1.94 mmHg    LVIDd 4.43 3.5 - 6.0 cm    IVS 1.15 (A) 0.6  - 1.1 cm    Posterior Wall 0.96 0.6 - 1.1 cm    Ao root annulus 2.78 cm    LVIDs 3.44 2.1 - 4.0 cm    FS 22 28 - 44 %    STJ 2.42 cm    Ascending aorta 2.32 cm    LV mass 161.00 g    LA size 3.85 cm    Left Ventricle Relative Wall Thickness 0.43 cm    AV mean gradient 4 mmHg    AV valve area 1.56 cm2    AV Velocity Ratio 0.79     AV index (prosthetic) 0.78     MV valve area p 1/2 method 6.15 cm2    E/A ratio 1.31     E wave deceleration time 123.020663463684869 msec    IVRT 39.661106443338665 msec    LVOT diameter 1.60 cm    LVOT area 2.0 cm2    LVOT peak lio 1.00 m/s    LVOT peak VTI 19.70 cm    Ao peak lio 1.26 m/s    Ao VTI 25.4 cm    RVOT peak lio 0.62 m/s    RVOT peak VTI 13.3 cm    Mr max lio 6.14 m/s    LVOT stroke volume 39.59 cm3    AV peak gradient 6 mmHg    PV mean gradient 0.85 mmHg    MV Peak E Lio 1.11 m/s    TR Max Lio 4.26 m/s    MV stenosis pressure 1/2 time 35.821480428384217 ms    MV Peak A Lio 0.85 m/s    LV Systolic Volume 48.80 mL    LV Systolic Volume Index 29.0 mL/m2    LV Diastolic Volume 88.87 mL    LV Diastolic Volume Index 52.90 mL/m2    LV Mass Index 96 g/m2    RA Major Axis 3.88 cm    Left Atrium Minor Axis 3.59 cm    Left Atrium Major Axis 4.08 cm    Triscuspid Valve Regurgitation Peak Gradient 73 mmHg    EF 55 %    Narrative    · Concentric hypertrophy and normal systolic function.  · The estimated ejection fraction is 55%.  · Normal left ventricular diastolic function.  · Normal right ventricular size with normal right ventricular systolic   function.  · Moderate tricuspid regurgitation.  · There is pulmonary hypertension.      , EKG: Reviewed, and X-Ray: CXR: X-Ray Chest 1 View (CXR): No results found for this visit on 07/12/22. and X-Ray Chest PA and Lateral (CXR): No results found for this visit on 07/12/22.

## 2022-07-27 NOTE — PLAN OF CARE
Duration: 3.5 hours    Stable/unstable: stable    Ultrafiltration volume: net removal 2 liters     Notes: Tolerated, No access issues, Dr. Henriquez visited during hd.

## 2022-07-27 NOTE — PROGRESS NOTES
[facility] Care ([facility])  Nephrology  Progress Note    Patient Name: Avril Monzon  MRN: [id number]  Admission Date: 7/12/2022  Hospital Length of Stay: 15 days  Attending Provider: Sanju Locke MD   Primary Care Physician: Rose Parks MD  Principal Problem:S/P CABG (coronary artery bypass graft)    Subjective:     HPI: Noted    No new subjective & objective note has been filed under this hospital service since the last note was generated.    *First visit with patient; 7/25/22: Denies any shortness of breath or chest pain.     *For 7/26/22: Seen and examined; chart reviewed; had HD yesterday for 2 liters UF.    *For 7/27/22: None voiced. For HD today.        Chest CTA BL  CV RRR without murmur  LUE AVF with good thrill/bruit  Neuro alert and oriented x 3  abd soft NT ND     Latest Reference Range & Units 07/25/22 06:21   Sodium 136 - 145 mmol/L  136 - 145 mmol/L 134 (L)  136   Potassium 3.5 - 5.1 mmol/L  3.5 - 5.1 mmol/L 3.7  3.8   Chloride 95 - 110 mmol/L  95 - 110 mmol/L 99  100   CO2 23 - 29 mmol/L  23 - 29 mmol/L 25  26   Anion Gap 8 - 16 mmol/L  8 - 16 mmol/L 10  10   BUN 8 - 23 mg/dL  8 - 23 mg/dL 36 (H)  36 (H)   Creatinine 0.5 - 1.4 mg/dL  0.5 - 1.4 mg/dL 4.2 (H)  4.2 (H)   eGFR if non African American >60 mL/min/1.73 m^2  >60 mL/min/1.73 m^2 10 !  10 !   eGFR if African American >60 mL/min/1.73 m^2  >60 mL/min/1.73 m^2 11 !  11 !   Glucose 70 - 110 mg/dL  70 - 110 mg/dL 124 (H)  125 (H)   Calcium 8.7 - 10.5 mg/dL  8.7 - 10.5 mg/dL 8.4 (L)  8.5 (L)   Phosphorus 2.7 - 4.5 mg/dL  2.7 - 4.5 mg/dL 2.1 (L)  2.0 (L)   (L): Data is abnormally low  (H): Data is abnormally high  !: Data is abnormal     Latest Reference Range & Units 07/25/22 06:21   WBC 3.90 - 12.70 K/uL 10.13   RBC 4.00 - 5.40 M/uL 3.32 (L)   Hemoglobin 12.0 - 16.0 g/dL 10.1 (L)   Hematocrit 37.0 - 48.5 % 30.1 (L)   MCV 82 - 98 fL 91   MCH 27.0 - 31.0 pg 30.4   MCHC 32.0 - 36.0 g/dL 33.6   RDW 11.5 - 14.5 % 15.7 (H)   Platelets  150 - 450 K/uL 144 (L)   MPV 9.2 - 12.9 fL 9.3   Gran % 38.0 - 73.0 % 82.5 (H)   Lymph % 18.0 - 48.0 % 9.0 (L)   Mono % 4.0 - 15.0 % 6.8   Eosinophil % 0.0 - 8.0 % 0.9   Basophil % 0.0 - 1.9 % 0.3   Immature Granulocytes 0.0 - 0.5 % 0.5   Gran # (ANC) 1.8 - 7.7 K/uL 8.4 (H)   Lymph # 1.0 - 4.8 K/uL 0.9 (L)   Mono # 0.3 - 1.0 K/uL 0.7   Eos # 0.0 - 0.5 K/uL 0.1   Baso # 0.00 - 0.20 K/uL 0.03   (L): Data is abnormally low  (H): Data is abnormally high        Assessment/Plan:     76 y/o female with ESRD on chronic HD presented with acute coronary syndrome:     ESRD (end stage renal disease)  ESRD pt on chronic HD q MWF at [facility]  On CRRT from CABG on 7/20/22 until 7/23/22  Good response to CRRT  Switched back to conventional HD  On HD, s/p HD yesterday, tolerated HD well  Next HD in am (proior schedule), orders placed in chart     K normal  Hyponatremia  Metabolic acidosis stable   HTN: BP controlled. Meds reviewed  Anemia: normocytic. On epogen      * Acute coronary events  NSTEMI  S/p CABG on 7/20/22  Hemodynamically stable, doing well clinically      Type 2 diabetes mellitus with hyperglycemia, with long-term current use of insulin  Reviewed the chart.        Plans and recommendations:  As discussed above  Total critical care time spent 40 minutes including time needed to review the records, the   patient evaluation, documentation, discussion with ICU and other providers,   more than 50% of the time was spent on coordination of care and counseling.       *For 7/25/22: Next HD session planned for today; likely will only tolerate 2 liters UF; not appearing volume overloaded; meds and labs reviewed; access stable; following closely with you; next session after today would be Wednesday. Phos replacement x 1 dosed.     *For 7/26/22: Next HD session in morning prior to planned discharge optimally.  See CPOE; following closely with you.  Access stable with good thrill/bruit.     *For 7/27/22: HD planned for today;  then dc; spoke with CTS colleague; access with good thrill/bruit; volume status excellent. To return to normal outpt HD clinic Friday.     Gordo Henriquez MD  Nephrology  [facility] Intensive [facility] ([facility])

## 2022-07-27 NOTE — PROGRESS NOTES
Subjective:      Patient ID: Avril Monzon is a 77 y.o. female.    Chief Complaint: Chest Pain (Seen at urgent care yesterday; c/o sharp pain with shortness of breath; hx of MI three years ago)    HPI:  77-year-old female who was admitted with the NSTEMI had i left heart catheterization which revealed 3 vessel coronary artery disease.  Patient has previous PCI to her proximal LAD and she has the diabetic hypertension hyperlipidemia end-stage renal disease on dialysis.  She also had pulmonary hypertension with EF of 40% global hypokinesis he moderate mitral regurgitation preop.  She was chronically anemic and history of smoking in the past she has diabetic retinopathy.  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  The patient had multiple dialysis she was also dated for the Plavix washout before she was taken up for surgery.  The patient had CABG x2 on a beating heart pump assisted coronary artery bypass grafting in the postoperative.  The patient had pulmonary hypertension with right heart failure for which she was assisted with nitric oxide inhalation she came to the ICU and was started on her planned CRRT.  The patient remained coagulopathic and hypo thermic overnight for which she was treated with FFP cryo and also packed cells she is on epinephrine Primacor and intermittent vasopressin she is awake and follows simple commands and this morning her nitric oxide is being weaned off.  7/22/22 the patient nitric oxide has been weaned off she remained hemodynamically stable had AP is downto0 4and a Primacor is 0.2 she is awake following commands  07/23/2022 patient was extubated of nitric oxide sitting up in a chair hemodynamically stable off inotropes her CRRT was stopped yesterday plan is to get dialysis today .  The chest tube output has considerably slowed down serous  drainage.  07/24/2022 the patient was extubated yesterday and she got hemodialyzed she is tolerating regular diet she will control has been fair No pain issues good motivation  07/25/2022 the patient had a quiet night overnight no major issues she is sitting up in a chair eating breakfast this morning  Still in place will DC that this morning she has floor  \ arteries to go to.  07/26/2022 the patient had at comfortable overnight no acute issues she does complain of toe discoloration on the right side.  07/27/2022 the patient had uneventful night no pain issues she was stable hemodynamically the incisions are looking good.      Review of patient's allergies indicates:   Allergen Reactions    Codeine Swelling    Darvon [propoxyphene] Swelling    Atorvastatin      Muscle twitching       Past Medical History:   Diagnosis Date    Acute hypoxemic respiratory failure 11/26/2018    Anemia     Arthritis     back, hand, knees    Back pain     Cataract     CKD stage G4/A3, GFR 15 - 29 and albumin creatinine ratio >300 mg/g     GFR 40% Jan 2014 and 33% ub 3/2014 ([facility])    Coronary artery disease involving native coronary artery of native heart 11/30/2018    Diabetic retinopathy     DM (diabetes mellitus) type II controlled, neurological manifestation     Exudative age-related macular degeneration of left eye with active choroidal neovascularization 2/5/2019    GERD (gastroesophageal reflux disease)     Glaucoma     Heart failure     Hyperlipidemia     Hypertension     Non-ST elevation MI (NSTEMI) 11/28/2018    NSTEMI (non-ST elevated myocardial infarction) 11/27/2018    Peripheral neuropathy     Polyneuropathy     Proteinuria     >6 mo     Seizures     [facility] 1/2014 -dick CT NRI showed small vessel    Stroke 2012,2014    Tobacco dependence     Type 2 diabetes with peripheral circulatory disorder, controlled        Family History   Problem Relation Age of Onset    Diabetes Mother     Hyperlipidemia  Mother     Hypertension Mother     Heart disease Mother         MI    Muscular dystrophy Son     Cancer Maternal Grandmother         colon       Social History     Socioeconomic History    Marital status:    Tobacco Use    Smoking status: Former Smoker     Packs/day: 1.50     Years: 30.00     Pack years: 45.00     Types: Cigarettes     Quit date: 1990     Years since quittin.5    Smokeless tobacco: Former User   Substance and Sexual Activity    Alcohol use: No     Alcohol/week: 0.0 standard drinks    Drug use: No    Sexual activity: Not Currently       Past Surgical History:   Procedure Laterality Date    BREAST LUMPECTOMY Left     benign, when patient was 15 years old    BREAST MASS EXCISION      ,benign, age 15.    CATHETERIZATION OF BOTH LEFT AND RIGHT HEART N/A 2018    Procedure: CATHETERIZATION, HEART, BOTH LEFT AND RIGHT;  Surgeon: Michelle Holman MD;  Location: [facility] CATH LAB;  Service: Cardiology;  Laterality: N/A;    CATHETERIZATION OF BOTH LEFT AND RIGHT HEART N/A 2018    Procedure: CATHETERIZATION, HEART, BOTH LEFT AND RIGHT;  Surgeon: Michelle Holman MD;  Location: [facility] CATH LAB;  Service: Cardiology;  Laterality: N/A;    CORONARY ARTERY BYPASS GRAFT (CABG) N/A 2022    Procedure: CORONARY ARTERY BYPASS GRAFT (CABG);  Surgeon: Sanju Locke MD;  Location: [facility] OR;  Service: Cardiothoracic;  Laterality: N/A;  2-VESSEL PUMP ASSIST WITH EPI-AORTIC ULTRASOUND    ENDOSCOPIC HARVEST OF VEIN Left 2022    Procedure: SURGICAL PROCUREMENT, VEIN, ENDOSCOPIC;  Surgeon: Sanju Locke MD;  Location: [facility] OR;  Service: Cardiothoracic;  Laterality: Left;    HYSTERECTOMY  1982    INJECTION OF ANESTHETIC AGENT AROUND MULTIPLE INTERCOSTAL NERVES N/A 2022    Procedure: BLOCK, NERVE, INTERCOSTAL, 2 OR MORE;  Surgeon: Sanju Locke MD;  Location: [facility] OR;  Service: Cardiothoracic;  Laterality: N/A;  PARASTERNAL NERVE BLOCK    INSERTION OF SHUNT      LEFT  "HEART CATHETERIZATION Left 12/3/2018    Procedure: CATHETERIZATION, HEART, LEFT;  Surgeon: Michelle Holman MD;  Location: [facility] CATH LAB;  Service: Cardiology;  Laterality: Left;  LAD ATHERECTOMY STENT/ FEMORAL APPROACH    LEFT HEART CATHETERIZATION Left 7/13/2022    Procedure: CATHETERIZATION, HEART, LEFT;  Surgeon: Abhi Sloan MD;  Location: [facility] CATH LAB;  Service: Cardiology;  Laterality: Left;    OOPHORECTOMY      wrist cyst Right 1980s    dorsal wrist cyst       Review of Systems   Unable to perform ROS: Acuity of condition   Constitutional: Negative for activity change and appetite change.   HENT: Negative for dental problem, nosebleeds and sore throat.    Eyes: Negative for discharge and visual disturbance.   Respiratory: Negative for cough, chest tightness and stridor.    Cardiovascular: Negative for leg swelling.   Gastrointestinal: Negative for abdominal distention and abdominal pain.   Genitourinary: Negative for difficulty urinating and dysuria.   Musculoskeletal: Negative for arthralgias, back pain and joint swelling.   Allergic/Immunologic: Negative for environmental allergies.   Neurological: Negative for dizziness, syncope and headaches.   Hematological: Does not bruise/bleed easily.   Psychiatric/Behavioral: Negative for behavioral problems.          Objective:   BP (!) 145/67 (Patient Position: Lying)   Pulse 73   Temp 98.2 °F (36.8 °C) (Oral)   Resp 17   Ht 5' 5" (1.651 m)   Wt 62.7 kg (138 lb 3.7 oz)   LMP  (LMP Unknown)   SpO2 95%   Breastfeeding No   BMI 23.00 kg/m²     X-Ray Chest AP Portable  Narrative: EXAMINATION:  XR CHEST AP PORTABLE    CLINICAL HISTORY:  Post-op; Atherosclerotic heart disease of native coronary artery without angina pectoris    TECHNIQUE:  Single frontal view of the chest was performed.    COMPARISON:  07/21/2022    FINDINGS:  Tubes and lines are stable.   In comparison to the prior study, there is no adverse interval changes.  Impression: In " comparison to the prior study, there is no adverse interval changes    Electronically signed by: Sebastien Trent MD  Date:    07/22/2022  Time:    06:55         Physical Exam  Vitals and nursing note reviewed.   Constitutional:       Appearance: Normal appearance.      Comments: Sitting up in a chair   HENT:      Head: Normocephalic and atraumatic.      Mouth/Throat:      Mouth: Mucous membranes are moist.   Eyes:      Extraocular Movements: Extraocular movements intact.      Pupils: Pupils are equal, round, and reactive to light.   Cardiovascular:      Rate and Rhythm: Normal rate and regular rhythm.      Pulses: Normal pulses.      Heart sounds:     Friction rub present.   Pulmonary:      Effort: Pulmonary effort is normal.      Breath sounds: Normal breath sounds.   Abdominal:      General: Abdomen is flat.      Palpations: Abdomen is soft.   Musculoskeletal:      Cervical back: Normal range of motion and neck supple.      Right lower leg: Edema present.      Left lower leg: Edema present.      Comments: Right-sided tip of the 2nd and 3rd toe cyanosis palpable dorsalis pedis artery and a weak posterior tibial artery   Skin:     General: Skin is warm.      Capillary Refill: Capillary refill takes less than 2 seconds.   Neurological:      General: No focal deficit present.      Mental Status: She is alert and oriented to person, place, and time. Mental status is at baseline.   Psychiatric:         Mood and Affect: Mood normal.         Assessment:     1. Coronary artery disease involving native heart, unspecified vessel or lesion type, unspecified whether angina present    2. Chest pain    3. NSTEMI (non-ST elevated myocardial infarction)    4. Diabetic ketoacidosis without coma associated with other specified diabetes mellitus    5. Hyperkalemia    6. ESRD (end stage renal disease)    7. Elevated troponin I level    8. Congestive heart failure, unspecified HF chronicity, unspecified heart failure type    9. Elevated  troponin    10. Post-operative state    11. S/P CABG (coronary artery bypass graft)    12. CAD (coronary artery disease)    13. DKA (diabetic ketoacidosis)    14. Hypertension associated with diabetes    15. Metabolic acidosis    16. On mechanically assisted ventilation          Plan   CABG x2 postop day 6 severe pulmonary hypertension end-stage renal disease on dialysis  Aggressive pulmonary toilet  Aspirin  Hyperlipidemia she is allergic to statins  Hypertension beta-blockers  Anemia acute blood loss stabnle  The which she Coagulopathy resolved  Diabetes mellitus on insulin sliding scale and long-acting on a diabetic diet  Right foot diabetic foot.  Will get a Doppler at the time of discharge.  DVT and GI prophylaxis Pepcid and SCDs  Nutrition diabetic diet  End-stage renal disease hemodialysis per Renal  Transferred to the floor  PTOT  DC planning.    I spent 35 minutes rounding on the patient with multi disciplinary team and patient care          Sanju Locke MD  [facility]  [place]

## 2022-07-27 NOTE — PT/OT/SLP PROGRESS
"Physical Therapy  Treatment    Avril Monzon   MRN: [id number]   Admitting Diagnosis: S/P CABG (coronary artery bypass graft)    PT Received On: 07/27/22  PT Start Time: 0905     PT Stop Time: 0928    PT Total Time (min): 23 min       Billable Minutes:  Gait Training 10 and Therapeutic Activity 13    Treatment Type: Treatment  PT/PTA: PT     PTA Visit Number: 0       General Precautions: Standard, fall, sternal  Orthopedic Precautions: N/A   Braces: N/A  Respiratory Status: Room air    Subjective:  Communicated with NURSE METZ prior to session.  Pain/Comfort  Pain Rating 1: 0/10    Objective:   Patient found with: telemetry, peripheral IV    Functional Mobility:  Therapeutic Activities and Exercises:  PT FOUND SUPINE IN BED UPON ARRIVAL, AGREEABLE TO TX., EAGER TO WALK, SUP>SIT WITH CGA, SEATED SCOOT TO EOB WITH SBA, SIT>STAND WITH CGA, REVIEW STERNAL PRECAUTIONS, PT AMB 60' X 2 TRIALS WITH RW AND JERRY, SLOW PACE, UNSTEADY WITH MULTIPLE SMALL LOB MARK WITH FATIGUE ON RETURN TRIP TO ROOM, CUES FOR UPRIGHT POSTURE, PT WITH HR OF 87 BPM WITH EXERTION, P+ DYNAMIC BALANCE, PT RETURN TO ROOM TO BEDSIDE CHAIR WITH JERRY. REVIEW BLE THEREX TO PERFORM WHILE SEATED IN CHAIR TO TOLERANCE THROUGHOUT THE DAY: HIP FLEX/EXT, LAQ, HEEL SLIDES, AP'S.  PT ENCOURAGED TO INCREASE TIME OOB IN CHAIR IN ORDER TO IMPROVE OVERALL ACTIVITY TOLERANCE, PT UNDERSTANDS AND AGREES TO POC  PT EDUCATED ON RISK FOR FALLS DUE TO GENERALIZED WEAKNESS, EDUCATED ON "CALL DON'T FALL", ENCOURAGED TO CALL FOR ASSISTANCE WITH ALL NEEDS SUCH AS BED<>CHAIR TRANSFERS OR TRIPS TO BATHROOM, PT AGREEABLE TO SAFETY PRECAUTIONS    AM-PAC 6 CLICK MOBILITY  How much help from another person does this patient currently need?   1 = Unable, Total/Dependent Assistance  2 = A lot, Maximum/Moderate Assistance  3 = A little, Minimum/Contact Guard/Supervision  4 = None, Modified [place]/Independent    Turning over in bed (including adjusting bedclothes, sheets and " blankets)?: 4  Sitting down on and standing up from a chair with arms (e.g., wheelchair, bedside commode, etc.): 3  Moving from lying on back to sitting on the side of the bed?: 4  Moving to and from a bed to a chair (including a wheelchair)?: 3  Need to walk in hospital room?: 3  Climbing 3-5 steps with a railing?: 1  Basic Mobility Total Score: 18    AM-PAC Raw Score CMS G-Code Modifier Level of Impairment Assistance   6 % Total / Unable   7 - 9 CM 80 - 100% Maximal Assist   10 - 14 CL 60 - 80% Moderate Assist   15 - 19 CK 40 - 60% Moderate Assist   20 - 22 CJ 20 - 40% Minimal Assist   23 CI 1-20% SBA / CGA   24 CH 0% Independent/ Mod I     Patient left up in chair with all lines intact, call button in reach, chair alarm on and NURSE notified.    Assessment:  Avril Monzon is a 77 y.o. female with a medical diagnosis of S/P CABG (coronary artery bypass graft) and presents with IMPAIRED FUNCTIONAL MOBILITY. PT WILL BENEFIT FROM CONT. SKILLED P.T. TO ADDRESS IMPAIRMENTS    Rehab identified problem list/impairments: Rehab identified problem list/impairments: weakness, impaired endurance, impaired functional mobility, gait instability, impaired balance, decreased safety awareness, decreased coordination    Rehab potential is good.    Activity tolerance: Good    Discharge recommendations: Discharge Facility/Level of Care Needs: home health PT     Barriers to discharge:      Equipment recommendations: Equipment Needed After Discharge: shower chair     GOALS:   Multidisciplinary Problems     Physical Therapy Goals        Problem: Physical Therapy    Goal Priority Disciplines Outcome Goal Variances Interventions   Physical Therapy Goal     PT, PT/OT Ongoing, Progressing     Description: LTGs to be met in 14 days time (8/5/22)  Pt will perform bed mobility with SUP in order to participate in EOB activity.  Pt will tolerate sitting EOB x 10 mins with SUP in order to participate in daily tasks.   Pt will tolerate  siting OOB x 3-4 hours in order to participate in daily tasks.   Pt will ambulate 100ft SUP in order to safely navigate surroundings.                    PLAN:    Patient to be seen 3 x/week  to address the above listed problems via therapeutic activities, therapeutic exercises, gait training  Plan of Care expires: 08/05/22  Plan of Care reviewed with: patient    07/27/2022

## 2022-07-27 NOTE — PLAN OF CARE
Patient tolerated intervention well overall. Functional mobility x80ft x2 trials with min A due to small LOB. Recommending HHOT and bath bench at d/c.

## 2022-07-28 ENCOUNTER — PATIENT OUTREACH (OUTPATIENT)
Dept: ADMINISTRATIVE | Facility: CLINIC | Age: 77
End: 2022-07-28
Payer: MEDICARE

## 2022-07-28 DIAGNOSIS — Z95.1 S/P CABG (CORONARY ARTERY BYPASS GRAFT): Primary | ICD-10-CM

## 2022-07-28 PROCEDURE — G0180 MD CERTIFICATION HHA PATIENT: HCPCS | Mod: ,,, | Performed by: THORACIC SURGERY (CARDIOTHORACIC VASCULAR SURGERY)

## 2022-07-28 PROCEDURE — G0180 PR HOME HEALTH MD CERTIFICATION: ICD-10-PCS | Mod: ,,, | Performed by: THORACIC SURGERY (CARDIOTHORACIC VASCULAR SURGERY)

## 2022-07-28 NOTE — PROGRESS NOTES
Second attempt  C3 nurse spoke with Avril Monzon's daughter, Elizabeth Monzon, for a TCC post hospital discharge follow up call. The patient has a scheduled HOSFU appointment with Jian William NP on 8/2/2022 @ 0653.

## 2022-07-28 NOTE — PROGRESS NOTES
C3 nurse attempted to contact Avril Monzon for a TCC post hospital discharge follow up call. No answer. Left voicemail with callback information - daughter and sister. The patient does not have a scheduled HOSFU appointment. Message sent to PCP staff for assistance with scheduling visit with patient.

## 2022-08-01 ENCOUNTER — TELEPHONE (OUTPATIENT)
Dept: FAMILY MEDICINE | Facility: CLINIC | Age: 77
End: 2022-08-01
Payer: MEDICARE

## 2022-08-01 ENCOUNTER — OUTPATIENT CASE MANAGEMENT (OUTPATIENT)
Dept: ADMINISTRATIVE | Facility: OTHER | Age: 77
End: 2022-08-01
Payer: MEDICARE

## 2022-08-01 ENCOUNTER — PATIENT OUTREACH (OUTPATIENT)
Dept: ADMINISTRATIVE | Facility: HOSPITAL | Age: 77
End: 2022-08-01
Payer: MEDICARE

## 2022-08-01 PROBLEM — R79.89 ELEVATED TROPONIN: Status: RESOLVED | Noted: 2021-12-06 | Resolved: 2022-08-01

## 2022-08-01 PROBLEM — Z53.21 PATIENT LEFT WITHOUT BEING SEEN: Status: RESOLVED | Noted: 2022-05-04 | Resolved: 2022-08-01

## 2022-08-01 PROBLEM — G44.89 OTHER HEADACHE SYNDROME: Status: RESOLVED | Noted: 2019-03-26 | Resolved: 2022-08-01

## 2022-08-01 NOTE — PROGRESS NOTES
Subjective:       Patient ID: Avril Monzon is a 77 y.o. Black female here today for:  HOSPITAL FOLLOW-UP    HPI:    Avril Monzon is here today for a hospital follow-up.  The patient is brought to the clinic by her grand-daughter (Luz Elena).  I have reviewed the patient's medical history in detail and updated the computerized patient record.    Facility: [facility]  DOA:  7/12/2022  DOD:  7/27/2022    DISCHARGE SUMMARY:    Chief Complaint:  NSTEMI      HPI:    77-year-old female who was admitted with the NSTEMI had i left heart catheterization which revealed 3 vessel coronary artery disease.  Patient has previous PCI to her proximal LAD and she has the diabetic hypertension hyperlipidemia end-stage renal disease on dialysis.  She also had pulmonary hypertension with EF of 40% global hypokinesis he moderate mitral regurgitation preop.  She was chronically anemic and history of smoking in the past she has diabetic retinopathy.  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  The patient had multiple dialysis she was also dated for the Plavix washout before she was taken up for surgery.  The patient had CABG x2 on a beating heart pump assisted coronary artery bypass grafting in the postoperative.  The patient had pulmonary hypertension with right heart failure for which she was assisted with nitric oxide inhalation she came to the ICU and was started on her planned CRRT.  The patient remained coagulopathic and hypo thermic overnight for which she was treated with FFP cryo and also packed cells she is on epinephrine Primacor and intermittent vasopressin she is awake and follows simple commands and this morning her nitric oxide is being weaned off.    7/22/22 the patient nitric oxide has been weaned off she remained hemodynamically stable had AP is down to 0 4and a Primacor is 0.2 she is  awake following commands  07/23/2022 patient was extubated of nitric oxide sitting up in a chair hemodynamically stable off inotropes her CRRT was stopped yesterday plan is to get dialysis today .  The chest tube output has considerably slowed down serous drainage.  07/24/2022 the patient was extubated yesterday and she got hemodialyzed she is tolerating regular diet she will control has been fair No pain issues good motivation  07/25/2022 the patient had a quiet night overnight no major issues she is sitting up in a chair eating breakfast this morning  07/26/2022 the patient had at comfortable overnight no acute issues she does complain of toe discoloration on the right side.  07/27/2022 the patient had uneventful night no pain issues she was stable hemodynamically the incisions are looking good.       LABS REVIEWED DAY OF DISCHARGE:    Component      Latest Ref Rng & Units 7/27/2022 7/27/2022 7/27/2022          11:44 AM  4:33 AM  4:33 AM   WBC      3.90 - 12.70 K/uL   9.94   RBC      4.00 - 5.40 M/uL   3.31 (L)   Hemoglobin      12.0 - 16.0 g/dL   10.2 (L)   Hematocrit      37.0 - 48.5 %   30.0 (L)   MCV      82 - 98 fL   91   MCH      27.0 - 31.0 pg   30.8   MCHC      32.0 - 36.0 g/dL   34.0   RDW      11.5 - 14.5 %   15.6 (H)   Platelets      150 - 450 K/uL   173   MPV      9.2 - 12.9 fL   9.2   Immature Granulocytes      0.0 - 0.5 %   1.0 (H)   Gran # (ANC)      1.8 - 7.7 K/uL   7.7   Immature Grans (Abs)      0.00 - 0.04 K/uL   0.10 (H)   Lymph #      1.0 - 4.8 K/uL   1.0   Mono #      0.3 - 1.0 K/uL   1.0   Eos #      0.0 - 0.5 K/uL   0.1   Baso #      0.00 - 0.20 K/uL   0.03   nRBC      0 /100 WBC   0   Gran %      38.0 - 73.0 %   77.4 (H)   Lymph %      18.0 - 48.0 %   10.4 (L)   Mono %      4.0 - 15.0 %   9.9   Eosinophil %      0.0 - 8.0 %   1.0   Basophil %      0.0 - 1.9 %   0.3   Differential Method         Automated   Glucose      70 - 110 mg/dL  71 70   Sodium      136 - 145 mmol/L  133 (L) 133 (L)    Potassium      3.5 - 5.1 mmol/L  4.2 4.2   Chloride      95 - 110 mmol/L  95 95   CO2      23 - 29 mmol/L  28 27   BUN      8 - 23 mg/dL  43 (H) 43 (H)   Calcium      8.7 - 10.5 mg/dL  8.5 (L) 8.6 (L)   Creatinine      0.5 - 1.4 mg/dL  5.3 (H) 5.3 (H)   Albumin      3.5 - 5.2 g/dL  2.8 (L)    Phosphorus      2.7 - 4.5 mg/dL  2.3 (L) 2.3 (L)   eGFR if African American      >60 mL/min/1.73 m:2  8 (A) 8 (A)   eGFR if non African American      >60 mL/min/1.73 m:2  7 (A) 7 (A)   Anion Gap      8 - 16 mmol/L  10 11   Magnesium      1.6 - 2.6 mg/dL  3.0 (H) 3.0 (H)   POCT Glucose      70 - 110 mg/dL 133 (H)         Review of Systems   Constitutional: Positive for appetite change (picking up some) and fatigue. Negative for chills, diaphoresis and fever.        HH following with Nurse, Aide and PT/OT.   HENT: Negative.    Respiratory: Positive for shortness of breath (mainly when lying down or w/ exertion). Negative for cough and chest tightness.    Cardiovascular: Negative.         Reports home bp running well but not able to report readings.   Gastrointestinal: Negative for abdominal distention, abdominal pain, diarrhea, nausea and vomiting.   Endocrine: Negative for polydipsia and polyphagia.           Genitourinary:        Dialysis MWF   Musculoskeletal: Positive for gait problem. Negative for joint swelling.   Skin:        Leg & chest incisions   Neurological: Positive for weakness. Negative for tremors, syncope, light-headedness, numbness and headaches.        Review of patient's allergies indicates:   Allergen Reactions    Codeine Swelling    Darvon [propoxyphene] Swelling    Atorvastatin Muscle twitching       Patient Active Problem List   Diagnosis    Constipation    Hypertension associated with diabetes    ESRD (end stage renal disease)    Proteinuria    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, with long-term current use of insulin    Cataracta brunescens of  right eye    Bilateral carotid artery disease    Aortic atherosclerosis    Type 2 diabetes mellitus with circulatory disorder, with long-term current use of insulin    History of CVA (cerebrovascular accident)    Hyperparathyroidism    Vitamin D deficiency    DDD (degenerative disc disease), lumbar    Anemia in chronic kidney disease, on chronic dialysis    Iron deficiency anemia due to chronic blood loss    Hyperlipidemia associated with type 2 diabetes mellitus    Pulmonary hypertension    CAD (coronary artery disease)    Exudative age-related macular degeneration of left eye with active choroidal neovascularization    Non-rheumatic mitral regurgitation    Non-rheumatic tricuspid valve insufficiency    Age-related nuclear cataract of left eye    DM type 2 with diabetic peripheral neuropathy    Pulmonary edema    Unspecified inflammatory spondylopathy, lumbar region    Convulsions    Hemiplegia    Other specified complication of vascular prosthetic devices, implants and grafts, initial encounter    Chronic diastolic heart failure    NSTEMI (non-ST elevated myocardial infarction)    Metabolic acidosis    S/P CABG (coronary artery bypass graft)         Current Outpatient Medications:     ACCU-CHEK ARDEN PLUS METER Mis, USE TO TEST TWICE DAILY, Disp: , Rfl: 0    aspirin (ECOTRIN) 81 MG EC tablet, Take 1 tablet (81 mg total) by mouth once daily., Disp: 90 tablet, Rfl: 1    azelastine (ASTELIN) 137 mcg (0.1 %) nasal spray, SMARTSI Spray(s) Both Nares Twice Daily PRN, Disp: , Rfl:     benzonatate (TESSALON) 200 MG capsule, Take 200 mg by mouth 3 (three) times daily., Disp: , Rfl:     blood sugar diagnostic Strp, To check BG 2 times daily, to use with insurance preferred meter, Disp: 100 each, Rfl: 6    clopidogreL (PLAVIX) 75 mg tablet, Take 1 tablet (75 mg total) by mouth once daily., Disp: 90 tablet, Rfl: 3    cyanocobalamin (VITAMIN B-12) 1000 MCG tablet, Take 100 mcg by mouth once  "daily., Disp: , Rfl:     folic acid (FOLVITE) 1 MG tablet, Take 1 mg by mouth once daily., Disp: , Rfl:     insulin NPH/Reg human (HUMULIN, 70/30,) 100 unit/mL (70-30) InPn pen, To do 28 units subq in AM and 8 units subq in PM.  Dose to be done within 15 min before or after meal. (Patient taking differently: To do 28 units subq in AM and 8 units subq in PM.  Dose to be done within 15 min before or after meal.), Disp: 3 each, Rfl: 1    lancets Misc, To check BG 2 times daily, to use with insurance preferred meter, Disp: 100 each, Rfl: 6    metoprolol tartrate (LOPRESSOR) 50 MG tablet, Take 1 tablet (50 mg total) by mouth 2 (two) times daily., Disp: 180 tablet, Rfl: 1    semaglutide (OZEMPIC) 0.25 mg or 0.5 mg(2 mg/1.5 mL) pen injector, Inject 0.25 mg into the skin every 7 days., Disp: 1 pen, Rfl: 1    sevelamer carbonate (RENVELA) 800 mg Tab, TAKE 2 TABLETS BY MOUTH THREE TIMES DAILY, Disp: 180 tablet, Rfl: 0    telmisartan (MICARDIS) 20 MG Tab, Take 1 tablet (20 mg total) by mouth once daily., Disp: 90 tablet, Rfl: 1    traMADoL (ULTRAM) 50 mg tablet, Take 1 tablet (50 mg total) by mouth every 8 (eight) hours as needed for Pain., Disp: 10 tablet, Rfl: 0    triamcinolone acetonide 0.1% (KENALOG) 0.1 % cream, Apply topically 2 (two) times daily., Disp: 80 g, Rfl: 2      Past medical, surgical, family and social histories have been reviewed today.      Objective:     Vitals:    08/02/22 1004   BP: 132/62   Pulse: 79   Temp: 98.1 °F (36.7 °C)   SpO2: 98%   Weight: 58 kg (127 lb 12.1 oz)   Height: 5' 5" (1.651 m)       Physical Exam  Vitals reviewed.   Constitutional:       General: She is not in acute distress.  HENT:      Head: Normocephalic and atraumatic.   Eyes:      Pupils: Pupils are equal, round, and reactive to light.   Cardiovascular:      Rate and Rhythm: Normal rate and regular rhythm.      Pulses: Normal pulses.      Heart sounds: Murmur heard.   Pulmonary:      Effort: Pulmonary effort is normal. "      Breath sounds: Normal breath sounds.   Musculoskeletal:         General: Normal range of motion.      Cervical back: Normal range of motion and neck supple. No rigidity.      Right lower leg: No edema.      Left lower leg: No edema.   Skin:     Capillary Refill: Capillary refill takes less than 2 seconds.   Neurological:      Mental Status: She is alert and oriented to person, place, and time.      Motor: No weakness.      Gait: Gait normal.   Psychiatric:         Mood and Affect: Mood normal.         Behavior: Behavior normal.         Thought Content: Thought content normal.         Judgment: Judgment normal.         Diagnosis:     1. Hospital discharge follow-up  2. NSTEMI (non-ST elevated myocardial infarction)  3. Coronary artery disease involving native coronary artery of native heart with angina pectoris  4. S/P CABG (coronary artery bypass graft)  5. Hypertension associated with diabetes  6. ESRD (end stage renal disease)    Plan:     Stable, doing well.  No pain complaints today, incisions healing well.  Med-List reviewed and updated with her today.  Defer lab to Cardiology.    Follow-up:     With Cardiology as planned.  RTC prn.      SANCHEZ Salvador  [facility]       Patient Care Team:  Rose Germain MD as PCP - General (Internal Medicine)  Conchita Garcia RD, CDE as Dietitian (Diabetes)  Debbie Wagner RD (Inactive) as Dietitian (Nutrition)  Angela Hanson MD as Consulting Physician (Nephrology)  Demetrio Mejia MD as Consulting Physician (Cardiology)  Debbie Lopez, RN as Outpatient         Future Appointments   Date Time Provider Department Center   8/4/2022  2:00 PM Sanju Locke MD ON CARD [facility]   8/9/2022  8:15 AM JADIEL Coello MD [facility] [place]   8/18/2022  2:20 PM Demetrio Mejia MD [facility] CARDIO [facility]   9/14/2022 10:00 AM Natty Mistry NP [facility]    10/10/2022  3:40 PM Rose KIDD  MD [facility] [address]         30 minutes of total time spent on the encounter, which includes face to face time and non-face to face time preparing to see the patient (eg, review of tests), obtaining and/or reviewing separately obtained history, and documenting clinical information in the electronic or other health record.  Also includes independent interpretation of results (not separately reported) and communicating results to the patient/family/caregiver, with care coordination (not separately reported).

## 2022-08-01 NOTE — TELEPHONE ENCOUNTER
"----- Message from Debbie Lopez RN sent at 8/1/2022  3:41 PM CDT -----  Mrs Mackenzie was recently admitted to [facility] with diagnosis of MI. She had a CABG X 2 on 7/20/22. I just spoke with her daughter, Elizabeth, and they have not been checking her blood sugars since her hospital d/c on 7/27/22 because they cannot find the "cord" to charge her Free Style Piper reader. Elizabeth reports they want to have this reordered. I am completely unfamiliar with the Piper so have no understanding of what they need. Also, she has been wearing her current CGM sensor to the back of her L arm since before she went into the hospital and still has it on. I am going to contact [facility] to see if I can get the visiting nurse to remove it. Her daughter, Elizabeth, is unwilling to, stating she is not sure how to do it.     They need teaching on how to manage her CGM. Elizabeth does not want to become responsible to manage it, but someone needs to be identified as a caregiver at home for this as Mrs Mackenzie is legally blind and cannot do it herself. Do I need to get her an appt with an Diab dietician for this? Conchita Radha is on her care team so I am copying her on this message.     They have been giving her Hum 70/30 28 U in the AM's and 8 U in the PM's. She had her last dose of Ozempic on Wed, 7/27/22, so I encouraged Elizabeth to continue to give on Wed's starting this week.     Natty, her follow up with you is not until 9/14/22. If you have a sooner appt she could benefit from being seen sooner since they are not currently checking her blood sugar.    Thank you all and please message me with any questions,     Debbie Lopez RN, [facility]  Outpatient Case Management  [phone]  Ext [phone]  [email or web address]        "

## 2022-08-01 NOTE — PROGRESS NOTES
Called patient to confirm hospital follow up scheduled on 8/02/2022.   Patient is confirmed. Encouraged patient to bring all medications and BP cuff to follow up visit.

## 2022-08-02 ENCOUNTER — TELEPHONE (OUTPATIENT)
Dept: DIABETES | Facility: CLINIC | Age: 77
End: 2022-08-02
Payer: MEDICARE

## 2022-08-02 ENCOUNTER — OFFICE VISIT (OUTPATIENT)
Dept: FAMILY MEDICINE | Facility: CLINIC | Age: 77
End: 2022-08-02
Payer: MEDICARE

## 2022-08-02 VITALS
SYSTOLIC BLOOD PRESSURE: 132 MMHG | BODY MASS INDEX: 21.29 KG/M2 | WEIGHT: 127.75 LBS | OXYGEN SATURATION: 98 % | HEIGHT: 65 IN | TEMPERATURE: 98 F | HEART RATE: 79 BPM | DIASTOLIC BLOOD PRESSURE: 62 MMHG

## 2022-08-02 DIAGNOSIS — I21.4 NSTEMI (NON-ST ELEVATED MYOCARDIAL INFARCTION): ICD-10-CM

## 2022-08-02 DIAGNOSIS — I15.2 HYPERTENSION ASSOCIATED WITH DIABETES: Chronic | ICD-10-CM

## 2022-08-02 DIAGNOSIS — E11.59 HYPERTENSION ASSOCIATED WITH DIABETES: Chronic | ICD-10-CM

## 2022-08-02 DIAGNOSIS — I25.119 CORONARY ARTERY DISEASE INVOLVING NATIVE CORONARY ARTERY OF NATIVE HEART WITH ANGINA PECTORIS: ICD-10-CM

## 2022-08-02 DIAGNOSIS — E11.3553 TYPE 2 DIABETES MELLITUS WITH STABLE PROLIFERATIVE RETINOPATHY OF BOTH EYES, WITH LONG-TERM CURRENT USE OF INSULIN: Primary | ICD-10-CM

## 2022-08-02 DIAGNOSIS — Z95.1 S/P CABG (CORONARY ARTERY BYPASS GRAFT): ICD-10-CM

## 2022-08-02 DIAGNOSIS — Z09 HOSPITAL DISCHARGE FOLLOW-UP: Primary | ICD-10-CM

## 2022-08-02 DIAGNOSIS — N18.6 ESRD (END STAGE RENAL DISEASE): ICD-10-CM

## 2022-08-02 DIAGNOSIS — Z79.4 TYPE 2 DIABETES MELLITUS WITH STABLE PROLIFERATIVE RETINOPATHY OF BOTH EYES, WITH LONG-TERM CURRENT USE OF INSULIN: Primary | ICD-10-CM

## 2022-08-02 PROCEDURE — 1101F PR PT FALLS ASSESS DOC 0-1 FALLS W/OUT INJ PAST YR: ICD-10-PCS | Mod: CPTII,S$GLB,, | Performed by: REGISTERED NURSE

## 2022-08-02 PROCEDURE — 99999 PR PBB SHADOW E&M-EST. PATIENT-LVL IV: CPT | Mod: PBBFAC,,, | Performed by: REGISTERED NURSE

## 2022-08-02 PROCEDURE — 3046F HEMOGLOBIN A1C LEVEL >9.0%: CPT | Mod: CPTII,S$GLB,, | Performed by: REGISTERED NURSE

## 2022-08-02 PROCEDURE — 99214 OFFICE O/P EST MOD 30 MIN: CPT | Mod: S$GLB,,, | Performed by: REGISTERED NURSE

## 2022-08-02 PROCEDURE — 1111F PR DISCHARGE MEDS RECONCILED W/ CURRENT OUTPATIENT MED LIST: ICD-10-PCS | Mod: CPTII,S$GLB,, | Performed by: REGISTERED NURSE

## 2022-08-02 PROCEDURE — 3066F PR DOCUMENTATION OF TREATMENT FOR NEPHROPATHY: ICD-10-PCS | Mod: CPTII,S$GLB,, | Performed by: REGISTERED NURSE

## 2022-08-02 PROCEDURE — 99999 PR PBB SHADOW E&M-EST. PATIENT-LVL IV: ICD-10-PCS | Mod: PBBFAC,,, | Performed by: REGISTERED NURSE

## 2022-08-02 PROCEDURE — 3046F PR MOST RECENT HEMOGLOBIN A1C LEVEL > 9.0%: ICD-10-PCS | Mod: CPTII,S$GLB,, | Performed by: REGISTERED NURSE

## 2022-08-02 PROCEDURE — 99214 PR OFFICE/OUTPT VISIT, EST, LEVL IV, 30-39 MIN: ICD-10-PCS | Mod: S$GLB,,, | Performed by: REGISTERED NURSE

## 2022-08-02 PROCEDURE — 3288F FALL RISK ASSESSMENT DOCD: CPT | Mod: CPTII,S$GLB,, | Performed by: REGISTERED NURSE

## 2022-08-02 PROCEDURE — 4010F ACE/ARB THERAPY RXD/TAKEN: CPT | Mod: CPTII,S$GLB,, | Performed by: REGISTERED NURSE

## 2022-08-02 PROCEDURE — 3066F NEPHROPATHY DOC TX: CPT | Mod: CPTII,S$GLB,, | Performed by: REGISTERED NURSE

## 2022-08-02 PROCEDURE — 3078F DIAST BP <80 MM HG: CPT | Mod: CPTII,S$GLB,, | Performed by: REGISTERED NURSE

## 2022-08-02 PROCEDURE — 4010F PR ACE/ARB THEARPY RXD/TAKEN: ICD-10-PCS | Mod: CPTII,S$GLB,, | Performed by: REGISTERED NURSE

## 2022-08-02 PROCEDURE — 1101F PT FALLS ASSESS-DOCD LE1/YR: CPT | Mod: CPTII,S$GLB,, | Performed by: REGISTERED NURSE

## 2022-08-02 PROCEDURE — 3075F PR MOST RECENT SYSTOLIC BLOOD PRESS GE 130-139MM HG: ICD-10-PCS | Mod: CPTII,S$GLB,, | Performed by: REGISTERED NURSE

## 2022-08-02 PROCEDURE — 1125F AMNT PAIN NOTED PAIN PRSNT: CPT | Mod: CPTII,S$GLB,, | Performed by: REGISTERED NURSE

## 2022-08-02 PROCEDURE — 3078F PR MOST RECENT DIASTOLIC BLOOD PRESSURE < 80 MM HG: ICD-10-PCS | Mod: CPTII,S$GLB,, | Performed by: REGISTERED NURSE

## 2022-08-02 PROCEDURE — 3288F PR FALLS RISK ASSESSMENT DOCUMENTED: ICD-10-PCS | Mod: CPTII,S$GLB,, | Performed by: REGISTERED NURSE

## 2022-08-02 PROCEDURE — 1111F DSCHRG MED/CURRENT MED MERGE: CPT | Mod: CPTII,S$GLB,, | Performed by: REGISTERED NURSE

## 2022-08-02 PROCEDURE — 3075F SYST BP GE 130 - 139MM HG: CPT | Mod: CPTII,S$GLB,, | Performed by: REGISTERED NURSE

## 2022-08-02 PROCEDURE — 1125F PR PAIN SEVERITY QUANTIFIED, PAIN PRESENT: ICD-10-PCS | Mod: CPTII,S$GLB,, | Performed by: REGISTERED NURSE

## 2022-08-02 NOTE — TELEPHONE ENCOUNTER
Referral to diabetes education placed for Natty Mistry NP.   Move up appt with Natty to next week secondary to hosp d/c.   Will have staff advise Piper removal, troubleshoot cord issues, and make sure they have BG supplies.     WILLIAN Conrad-C  Diabetes Management

## 2022-08-02 NOTE — TELEPHONE ENCOUNTER
"----- Message from David Watkins PA-C sent at 8/2/2022  9:30 AM CDT -----  Hi Conchita Lewis, and staff,     Natty is out until Thursday and I am covering her messages.     Conchita, I will place a referral for you. Will you please reach out to her to schedule since sounds like family may need some education training as they assume care?     Staff, please call patient family and advise them that the Piper sensor will just peel off like a bandaid and they can discard. We will also move up their appt with Natty to Monday at 9a - put "Move up appt per BM". Please confirm with family that they can come that soon. As for the cord, they can purchase a cord on amaz[employer] for approx 10 dollars. We also can look around [place] and see if we have an extra cord they can come . Offer them a [place] for piper placement and tutorial today with patient and caregiver. If they are going to wait until Monday, that is fine but need to check her blood sugar via finger sticks and bring records to see Natty next week. Please make sure they have meter, strips, and lancets    Debbie, Thank you for letting us know. Will attempt to coordinate care     Natty, looping you in as FYSANDY. May consider CeQur / Dex on smart phone since legally blind       ----- Message -----  From: Shantel Don MA  Sent: 8/2/2022   9:00 AM CDT  To: David Watkins PA-C      ----- Message -----  From: Debbie Lopez RN  Sent: 8/1/2022   4:13 PM CDT  To: Conchita Garcia RD, CDE, #    Mrs Mackenzie was recently admitted to [facility] with diagnosis of MI. She had a CABG X 2 on 7/20/22. I just spoke with her daughter, Elizabeth, and they have not been checking her blood sugars since her hospital d/c on 7/27/22 because they cannot find the "cord" to charge her Free Style Piper reader. Elizabeth reports they want to have this reordered. I am completely unfamiliar with the Piper so have no understanding of what they need. Also, she has been " wearing her current CGM sensor to the back of her L arm since before she went into the hospital and still has it on. I am going to contact [facility] to see if I can get the visiting nurse to remove it. Her daughter, Elizabeth, is unwilling to, stating she is not sure how to do it.     They need teaching on how to manage her CGM. Elizabeth does not want to become responsible to manage it, but someone needs to be identified as a caregiver at home for this as Mrs Mackenzie is legally blind and cannot do it herself. Do I need to get her an appt with an Diab dietician for this? Conchita Yanblanc is on her care team so I am copying her on this message.     They have been giving her Hum 70/30 28 U in the AM's and 8 U in the PM's. She had her last dose of Ozempic on Wed, 7/27/22, so I encouraged Elizabeth to continue to give on Wed's starting this week.     Natty, her follow up with you is not until 9/14/22. If you have a sooner appt she could benefit from being seen sooner since they are not currently checking her blood sugar.    Thank you all and please message me with any questions,     Debbie Lopez RN, [facility]  Outpatient Case Management  [phone]  Ext [phone]  [email or web address]

## 2022-08-02 NOTE — PROGRESS NOTES
Outpatient Care Management  Plan of Care Follow Up Visit    Patient: Avril Monzon  MRN: [id number]  Date of Service: 08/01/2022  Completed by: Debbie Lopez RN  Referral Date: 12/20/2021  Program: Case Management (High Risk)    Reason for Visit   Patient presents with    Update Plan Of Care       Brief Summary: Phone contact with daughter and reviewed need for daily use of CGM for blood sugar monitoring. Message to NP at PCP's office, Diab NP and RD requesting training for Mrs Mackenzie and a willing family CG to manage CGM. Mrs Johnson agreed to follow up contact from this CM in about 10 days.    Patient Summary     Involvement of Care:  Do I have permission to speak with other family members about your care?       Patient Reported Labs & Vitals:  1.  Any Patient Reported Labs & Vitals?     2.  Patient Reported Blood Pressure:     3.  Patient Reported Pulse:     4.  Patient Reported Weight (Kg):     5.  Patient Reported Blood Glucose (mg/dl):       Medical and social history was reviewed with patient and/or caregiver.     Clinical Assessment     Reviewed and provided basic information on available community resources for mental health, transportation, wellness resources, and palliative care programs with patient and/or caregiver.     Complex Care Plan     Care plan was discussed and completed today with input from patient and/or caregiver.    Patient Instructions     Instructions were provided via the [employer] patient resources and are available for the patient to view on the patient portal.    Next Steps: Continue to attempt to educate about importance of diabetes control. Debbie Lopez RN     Todays OPCM Self-Management Care Plan was developed with the patients/caregivers input and was based on identified barriers from todays assessment.  Goals were written today with the patient/caregiver and the patient has agreed to work towards these goals to improve his/her overall well-being. Patient verbalized  understanding of the care plan, goals, and all of today's instructions. Encouraged patient/caregiver to communicate with his/her physician and health care team about health conditions and the treatment plan.  Provided my contact information today and encouraged patient/caregiver to call me with any questions as needed.

## 2022-08-03 ENCOUNTER — OUTPATIENT CASE MANAGEMENT (OUTPATIENT)
Dept: ADMINISTRATIVE | Facility: OTHER | Age: 77
End: 2022-08-03
Payer: MEDICARE

## 2022-08-03 ENCOUNTER — HOSPITAL ENCOUNTER (EMERGENCY)
Facility: HOSPITAL | Age: 77
Discharge: HOME | End: 2022-08-03
Attending: EMERGENCY MEDICINE
Payer: MEDICARE

## 2022-08-03 VITALS
DIASTOLIC BLOOD PRESSURE: 72 MMHG | BODY MASS INDEX: 21.28 KG/M2 | RESPIRATION RATE: 18 BRPM | SYSTOLIC BLOOD PRESSURE: 132 MMHG | TEMPERATURE: 99 F | WEIGHT: 127.88 LBS | HEART RATE: 75 BPM | OXYGEN SATURATION: 100 %

## 2022-08-03 DIAGNOSIS — Z99.2 ESRD (END STAGE RENAL DISEASE) ON DIALYSIS: ICD-10-CM

## 2022-08-03 DIAGNOSIS — Z95.1 HX OF CABG: ICD-10-CM

## 2022-08-03 DIAGNOSIS — U07.1 COVID-19: ICD-10-CM

## 2022-08-03 DIAGNOSIS — N18.6 ESRD (END STAGE RENAL DISEASE) ON DIALYSIS: ICD-10-CM

## 2022-08-03 DIAGNOSIS — I10 CHRONIC HYPERTENSION: ICD-10-CM

## 2022-08-03 DIAGNOSIS — J81.1 CHRONIC PULMONARY EDEMA: ICD-10-CM

## 2022-08-03 DIAGNOSIS — U07.1 LAB TEST POSITIVE FOR DETECTION OF COVID-19 VIRUS: Primary | ICD-10-CM

## 2022-08-03 LAB
ALBUMIN SERPL BCP-MCNC: 3.1 G/DL (ref 3.5–5.2)
ALP SERPL-CCNC: 84 U/L (ref 55–135)
ALT SERPL W/O P-5'-P-CCNC: 10 U/L (ref 10–44)
ANION GAP SERPL CALC-SCNC: 16 MMOL/L (ref 8–16)
AST SERPL-CCNC: 23 U/L (ref 10–40)
BASOPHILS # BLD AUTO: 0.02 K/UL (ref 0–0.2)
BASOPHILS NFR BLD: 0.3 % (ref 0–1.9)
BILIRUB SERPL-MCNC: 0.6 MG/DL (ref 0.1–1)
BUN SERPL-MCNC: 36 MG/DL (ref 8–23)
CALCIUM SERPL-MCNC: 9.2 MG/DL (ref 8.7–10.5)
CHLORIDE SERPL-SCNC: 97 MMOL/L (ref 95–110)
CK SERPL-CCNC: 39 U/L (ref 20–180)
CO2 SERPL-SCNC: 23 MMOL/L (ref 23–29)
CREAT SERPL-MCNC: 4.8 MG/DL (ref 0.5–1.4)
CRP SERPL-MCNC: 44 MG/L (ref 0–8.2)
CTP QC/QA: YES
DIFFERENTIAL METHOD: ABNORMAL
EOSINOPHIL # BLD AUTO: 0.1 K/UL (ref 0–0.5)
EOSINOPHIL NFR BLD: 1.4 % (ref 0–8)
ERYTHROCYTE [DISTWIDTH] IN BLOOD BY AUTOMATED COUNT: 16 % (ref 11.5–14.5)
EST. GFR  (NO RACE VARIABLE): 9 ML/MIN/1.73 M^2
FERRITIN SERPL-MCNC: 4177 NG/ML (ref 20–300)
GLUCOSE SERPL-MCNC: 136 MG/DL (ref 70–110)
HCT VFR BLD AUTO: 28.8 % (ref 37–48.5)
HGB BLD-MCNC: 9.3 G/DL (ref 12–16)
IMM GRANULOCYTES # BLD AUTO: 0.03 K/UL (ref 0–0.04)
IMM GRANULOCYTES NFR BLD AUTO: 0.5 % (ref 0–0.5)
LDH SERPL L TO P-CCNC: 407 U/L (ref 110–260)
LYMPHOCYTES # BLD AUTO: 0.6 K/UL (ref 1–4.8)
LYMPHOCYTES NFR BLD: 8.4 % (ref 18–48)
MCH RBC QN AUTO: 29.9 PG (ref 27–31)
MCHC RBC AUTO-ENTMCNC: 32.3 G/DL (ref 32–36)
MCV RBC AUTO: 93 FL (ref 82–98)
MONOCYTES # BLD AUTO: 0.4 K/UL (ref 0.3–1)
MONOCYTES NFR BLD: 5.9 % (ref 4–15)
NEUTROPHILS # BLD AUTO: 5.6 K/UL (ref 1.8–7.7)
NEUTROPHILS NFR BLD: 83.5 % (ref 38–73)
NRBC BLD-RTO: 0 /100 WBC
PLATELET # BLD AUTO: 293 K/UL (ref 150–450)
PMV BLD AUTO: 8.8 FL (ref 9.2–12.9)
POTASSIUM SERPL-SCNC: 4.2 MMOL/L (ref 3.5–5.1)
PROT SERPL-MCNC: 6.7 G/DL (ref 6–8.4)
RBC # BLD AUTO: 3.11 M/UL (ref 4–5.4)
SARS-COV-2 RDRP RESP QL NAA+PROBE: POSITIVE
SODIUM SERPL-SCNC: 136 MMOL/L (ref 136–145)
TROPONIN I SERPL DL<=0.01 NG/ML-MCNC: 0.17 NG/ML (ref 0–0.03)
WBC # BLD AUTO: 6.64 K/UL (ref 3.9–12.7)

## 2022-08-03 PROCEDURE — U0002 COVID-19 LAB TEST NON-CDC: HCPCS | Performed by: PHYSICIAN ASSISTANT

## 2022-08-03 PROCEDURE — 83615 LACTATE (LD) (LDH) ENZYME: CPT | Performed by: PHYSICIAN ASSISTANT

## 2022-08-03 PROCEDURE — 85025 COMPLETE CBC W/AUTO DIFF WBC: CPT | Performed by: PHYSICIAN ASSISTANT

## 2022-08-03 PROCEDURE — 63600175 PHARM REV CODE 636 W HCPCS: Performed by: EMERGENCY MEDICINE

## 2022-08-03 PROCEDURE — 82728 ASSAY OF FERRITIN: CPT | Performed by: PHYSICIAN ASSISTANT

## 2022-08-03 PROCEDURE — 80053 COMPREHEN METABOLIC PANEL: CPT | Performed by: PHYSICIAN ASSISTANT

## 2022-08-03 PROCEDURE — 93010 EKG 12-LEAD: ICD-10-PCS | Mod: ,,, | Performed by: INTERNAL MEDICINE

## 2022-08-03 PROCEDURE — 86140 C-REACTIVE PROTEIN: CPT | Performed by: PHYSICIAN ASSISTANT

## 2022-08-03 PROCEDURE — 82550 ASSAY OF CK (CPK): CPT | Performed by: PHYSICIAN ASSISTANT

## 2022-08-03 PROCEDURE — 93010 ELECTROCARDIOGRAM REPORT: CPT | Mod: ,,, | Performed by: INTERNAL MEDICINE

## 2022-08-03 PROCEDURE — 84484 ASSAY OF TROPONIN QUANT: CPT | Performed by: EMERGENCY MEDICINE

## 2022-08-03 PROCEDURE — 99284 EMERGENCY DEPT VISIT MOD MDM: CPT | Mod: 25

## 2022-08-03 PROCEDURE — 93005 ELECTROCARDIOGRAM TRACING: CPT

## 2022-08-03 RX ORDER — FUROSEMIDE 10 MG/ML
80 INJECTION INTRAMUSCULAR; INTRAVENOUS
Status: COMPLETED | OUTPATIENT
Start: 2022-08-03 | End: 2022-08-03

## 2022-08-03 RX ADMIN — FUROSEMIDE 80 MG: 10 INJECTION, SOLUTION INTRAMUSCULAR; INTRAVENOUS at 02:08

## 2022-08-03 NOTE — PROGRESS NOTES
8/3/22 - Patient and identified barriers were discussed during case conference with Medical Director and/or physician consultant and OPCM team.    Recommendations and Plan:  Offer referral to [facility] services for the blind to help with improved management of CGM use and keeping up with supplies. Discuss what she was doing in the recent past when her A1c was controlled and encourage her to resume that care regimen. Offer [employer] post hospital meals for recent admission. Debbie Lopez RN

## 2022-08-03 NOTE — ED PROVIDER NOTES
SCRIBE #1 NOTE: I, Jeromy Mendiola, am scribing for, and in the presence of, Lizzette Holder MD. I have scribed the entire note.      History      Chief Complaint   Patient presents with    Shortness of Breath     Sent by [facility] for bilateral pleural effusion, had recent heart sx 2 weeks ago, c/o SOB, tested positive today for covid, denies chest pain       Review of patient's allergies indicates:   Allergen Reactions    Codeine Swelling    Darvon [propoxyphene] Swelling    Atorvastatin      Muscle twitching        HPI   HPI    8/3/2022, 11:58 AM   History obtained from the patient      History of Present Illness: Avril Monzon is a 77 y.o. female patient with a PMHx of ESRD, CAD s/p CABG on 7/20/22 who presents to the Emergency Department for SOB, onset several days PTA. Pt was referred to the ED from [facility] after CXR showed bilateral pleural effusion. Pt tested positive for COVID-19 at [facility] earlier today as well. Symptoms are constant and moderate in severity. No mitigating or exacerbating factors reported. No associated sxs reported. Patient denies any fever, chills, n/v/d, CP, weakness, numbness, dizziness, headache, and all other sxs at this time. No prior Tx reported. Pt dialyzes every M/W/F, and last dialyzed 2 days ago (Monday). Pt has not yet dialyzed today, but still makes urine. No further complaints or concerns at this time.     Arrival mode: Personal vehicle     PCP: Rose Parks MD       Past Medical History:  Past Medical History:   Diagnosis Date    Acute hypoxemic respiratory failure 11/26/2018    Anemia     Arthritis     back, hand, knees    Back pain     Cataract     CKD stage G4/A3, GFR 15 - 29 and albumin creatinine ratio >300 mg/g     GFR 40% Jan 2014 and 33% ub 3/2014 ([facility])    Coronary artery disease involving native coronary artery of native heart 11/30/2018    Diabetic retinopathy     DM (diabetes mellitus) type II controlled, neurological manifestation     Exudative age-related  macular degeneration of left eye with active choroidal neovascularization 2/5/2019    GERD (gastroesophageal reflux disease)     Glaucoma     Heart failure     Hyperlipidemia     Hypertension     Non-ST elevation MI (NSTEMI) 11/28/2018    NSTEMI (non-ST elevated myocardial infarction) 11/27/2018    Peripheral neuropathy     Polyneuropathy     Proteinuria     >6 mo     Seizures     [facility] 1/2014 -dick CT NRI showed small vessel    Stroke 2012,2014    Tobacco dependence     Type 2 diabetes with peripheral circulatory disorder, controlled        Past Surgical History:  Past Surgical History:   Procedure Laterality Date    BREAST LUMPECTOMY Left     benign, when patient was 15 years old    BREAST MASS EXCISION      ,benign, age 15.    CATHETERIZATION OF BOTH LEFT AND RIGHT HEART N/A 11/28/2018    Procedure: CATHETERIZATION, HEART, BOTH LEFT AND RIGHT;  Surgeon: Michelle Holman MD;  Location: [facility] CATH LAB;  Service: Cardiology;  Laterality: N/A;    CATHETERIZATION OF BOTH LEFT AND RIGHT HEART N/A 11/30/2018    Procedure: CATHETERIZATION, HEART, BOTH LEFT AND RIGHT;  Surgeon: Michelle Holman MD;  Location: [facility] CATH LAB;  Service: Cardiology;  Laterality: N/A;    CORONARY ARTERY BYPASS GRAFT (CABG) N/A 7/20/2022    Procedure: CORONARY ARTERY BYPASS GRAFT (CABG);  Surgeon: Sanju Locke MD;  Location: [facility] OR;  Service: Cardiothoracic;  Laterality: N/A;  2-VESSEL PUMP ASSIST WITH EPI-AORTIC ULTRASOUND    ENDOSCOPIC HARVEST OF VEIN Left 7/20/2022    Procedure: SURGICAL PROCUREMENT, VEIN, ENDOSCOPIC;  Surgeon: Sanju Locke MD;  Location: [facility] OR;  Service: Cardiothoracic;  Laterality: Left;    HYSTERECTOMY  1982    INJECTION OF ANESTHETIC AGENT AROUND MULTIPLE INTERCOSTAL NERVES N/A 7/20/2022    Procedure: BLOCK, NERVE, INTERCOSTAL, 2 OR MORE;  Surgeon: Sanju Locke MD;  Location: [facility] OR;  Service: Cardiothoracic;  Laterality: N/A;  PARASTERNAL NERVE BLOCK    INSERTION OF SHUNT      LEFT  HEART CATHETERIZATION Left 12/3/2018    Procedure: CATHETERIZATION, HEART, LEFT;  Surgeon: Michelle Holman MD;  Location: [facility] CATH LAB;  Service: Cardiology;  Laterality: Left;  LAD ATHERECTOMY STENT/ FEMORAL APPROACH    LEFT HEART CATHETERIZATION Left 2022    Procedure: CATHETERIZATION, HEART, LEFT;  Surgeon: Abhi Sloan MD;  Location: [facility] CATH LAB;  Service: Cardiology;  Laterality: Left;    OOPHORECTOMY      wrist cyst Right 1980s    dorsal wrist cyst         Family History:  Family History   Problem Relation Age of Onset    Diabetes Mother     Hyperlipidemia Mother     Hypertension Mother     Heart disease Mother         MI    Muscular dystrophy Son     Cancer Maternal Grandmother         colon       Social History:  Social History     Tobacco Use    Smoking status: Former Smoker     Packs/day: 1.50     Years: 30.00     Pack years: 45.00     Types: Cigarettes     Quit date: 1990     Years since quittin.5    Smokeless tobacco: Former User   Substance and Sexual Activity    Alcohol use: No     Alcohol/week: 0.0 standard drinks    Drug use: No    Sexual activity: Not Currently       ROS   Review of Systems   Constitutional: Negative for chills and fever.   HENT: Negative for sore throat.    Respiratory: Positive for shortness of breath.    Cardiovascular: Negative for chest pain.   Gastrointestinal: Negative for diarrhea, nausea and vomiting.   Genitourinary: Negative for dysuria.   Musculoskeletal: Negative for back pain.   Skin: Negative for rash.   Neurological: Negative for dizziness, weakness, light-headedness, numbness and headaches.   Hematological: Does not bruise/bleed easily.   All other systems reviewed and are negative.    Physical Exam      Initial Vitals [22 1116]   BP Pulse Resp Temp SpO2   (!) 121/59 74 20 98.9 °F (37.2 °C) 99 %      MAP       --          Physical Exam  Nursing Notes and Vital Signs Reviewed.  Constitutional: Patient is in no acute  distress. Well-developed and well-nourished.  Head: Atraumatic. Normocephalic.  Eyes: PERRL. EOM intact. Conjunctivae are not pale. No scleral icterus.  ENT: Mucous membranes are moist. Oropharynx is clear and symmetric.    Neck: Supple. Full ROM.   Cardiovascular: Regular rate. Regular rhythm. No murmurs, rubs, or gallops. Distal pulses are 2+ and symmetric.  Pulmonary/Chest: Well-healing midline sternotomy surgical incision site. Diminished breath sounds.  Clear to auscultation bilaterally. No wheezing or rales.  Abdominal: Soft and non-distended.  There is no tenderness.  No rebound, guarding, or rigidity.   Musculoskeletal: Moves all extremities. No obvious deformities. No edema.  Skin: Warm and dry.  Neurological:  Alert, awake, and appropriate.  Normal speech.  No acute focal neurological deficits are appreciated.  Psychiatric: Normal affect. Good eye contact. Appropriate in content.    ED Course    Procedures  ED Vital Signs:  Vitals:    08/03/22 1116 08/03/22 1211 08/03/22 1231 08/03/22 1332   BP: (!) 121/59 (!) 184/81  (!) 192/77   Pulse: 74 73  72   Resp: 20 19  18   Temp: 98.9 °F (37.2 °C)      TempSrc: Oral      SpO2: 99% 100%  100%   Weight:   58 kg (127 lb 13.9 oz)     08/03/22 1433 08/03/22 1450   BP: (!) 144/56 132/72   Pulse: 75    Resp: 18 18   Temp:  98.7 °F (37.1 °C)   TempSrc:     SpO2: 100% 100%   Weight:         Abnormal Lab Results:  Labs Reviewed   CBC W/ AUTO DIFFERENTIAL - Abnormal; Notable for the following components:       Result Value    RBC 3.11 (*)     Hemoglobin 9.3 (*)     Hematocrit 28.8 (*)     RDW 16.0 (*)     MPV 8.8 (*)     Lymph # 0.6 (*)     Gran % 83.5 (*)     Lymph % 8.4 (*)     All other components within normal limits   COMPREHENSIVE METABOLIC PANEL - Abnormal; Notable for the following components:    Glucose 136 (*)     BUN 36 (*)     Creatinine 4.8 (*)     Albumin 3.1 (*)     eGFR 9 (*)     All other components within normal limits   C-REACTIVE PROTEIN - Abnormal;  Notable for the following components:    CRP 44.0 (*)     All other components within normal limits   FERRITIN - Abnormal; Notable for the following components:    Ferritin 4,177 (*)     All other components within normal limits   LACTATE DEHYDROGENASE - Abnormal; Notable for the following components:     (*)     All other components within normal limits   TROPONIN I - Abnormal; Notable for the following components:    Troponin I 0.171 (*)     All other components within normal limits   SARS-COV-2 RDRP GENE - Abnormal; Notable for the following components:    POC Rapid COVID Positive (*)     All other components within normal limits    Narrative:     This test utilizes isothermal nucleic acid amplification   technology to detect the SARS-CoV-2 RdRp nucleic acid segment.   The analytical sensitivity (limit of detection) is 125 genome   equivalents/mL.   A POSITIVE result implies infection with the SARS-CoV-2 virus;   the patient is presumed to be contagious.     A NEGATIVE result means that SARS-CoV-2 nucleic acids are not   present above the limit of detection. A NEGATIVE result should be   treated as presumptive. It does not rule out the possibility of   COVID-19 and should not be the sole basis for treatment decisions.   If COVID-19 is strongly suspected based on clinical and exposure   history, re-testing using an alternate molecular assay should be   considered.   This test is only for use under the Food and Drug   Administration s Emergency Use Authorization (EUA).   Commercial kits are provided by [employer].   Performance characteristics of the EUA have been independently   verified by [facility] Department of   Pathology and Laboratory Medicine.   _________________________________________________________________   The authorized Fact Sheet for Healthcare Providers and the authorized Fact   Sheet for Patients of the ID NOW COVID-19 are available on the FDA   website:      https://www.fda.gov/media/024630/download  https://www.fda.gov/media/173593/download           CK   B-TYPE NATRIURETIC PEPTIDE        All Lab Results:  Results for orders placed or performed during the hospital encounter of 08/03/22   CBC auto differential   Result Value Ref Range    WBC 6.64 3.90 - 12.70 K/uL    RBC 3.11 (L) 4.00 - 5.40 M/uL    Hemoglobin 9.3 (L) 12.0 - 16.0 g/dL    Hematocrit 28.8 (L) 37.0 - 48.5 %    MCV 93 82 - 98 fL    MCH 29.9 27.0 - 31.0 pg    MCHC 32.3 32.0 - 36.0 g/dL    RDW 16.0 (H) 11.5 - 14.5 %    Platelets 293 150 - 450 K/uL    MPV 8.8 (L) 9.2 - 12.9 fL    Immature Granulocytes 0.5 0.0 - 0.5 %    Gran # (ANC) 5.6 1.8 - 7.7 K/uL    Immature Grans (Abs) 0.03 0.00 - 0.04 K/uL    Lymph # 0.6 (L) 1.0 - 4.8 K/uL    Mono # 0.4 0.3 - 1.0 K/uL    Eos # 0.1 0.0 - 0.5 K/uL    Baso # 0.02 0.00 - 0.20 K/uL    nRBC 0 0 /100 WBC    Gran % 83.5 (H) 38.0 - 73.0 %    Lymph % 8.4 (L) 18.0 - 48.0 %    Mono % 5.9 4.0 - 15.0 %    Eosinophil % 1.4 0.0 - 8.0 %    Basophil % 0.3 0.0 - 1.9 %    Differential Method Automated    Comprehensive metabolic panel   Result Value Ref Range    Sodium 136 136 - 145 mmol/L    Potassium 4.2 3.5 - 5.1 mmol/L    Chloride 97 95 - 110 mmol/L    CO2 23 23 - 29 mmol/L    Glucose 136 (H) 70 - 110 mg/dL    BUN 36 (H) 8 - 23 mg/dL    Creatinine 4.8 (H) 0.5 - 1.4 mg/dL    Calcium 9.2 8.7 - 10.5 mg/dL    Total Protein 6.7 6.0 - 8.4 g/dL    Albumin 3.1 (L) 3.5 - 5.2 g/dL    Total Bilirubin 0.6 0.1 - 1.0 mg/dL    Alkaline Phosphatase 84 55 - 135 U/L    AST 23 10 - 40 U/L    ALT 10 10 - 44 U/L    Anion Gap 16 8 - 16 mmol/L    eGFR 9 (A) >60 mL/min/1.73 m^2   C-Reactive Protein   Result Value Ref Range    CRP 44.0 (H) 0.0 - 8.2 mg/L   Ferritin   Result Value Ref Range    Ferritin 4,177 (H) 20.0 - 300.0 ng/mL   Lactate Dehydrogenase   Result Value Ref Range     (H) 110 - 260 U/L   CK   Result Value Ref Range    CPK 39 20 - 180 U/L   Troponin I   Result Value Ref Range    Troponin  I 0.171 (H) 0.000 - 0.026 ng/mL   POCT COVID-19 Rapid Screening   Result Value Ref Range    POC Rapid COVID Positive (A) Negative     Acceptable Yes      *Note: Due to a large number of results and/or encounters for the requested time period, some results have not been displayed. A complete set of results can be found in Results Review.     Imaging Results:  Imaging Results          X-Ray Chest AP Portable (Final result)  Result time 08/03/22 12:04:58    Final result by Trev Dean MD (08/03/22 12:04:58)                 Impression:      1.  Worsening vascular congestion versus reticular interstitial changes throughout the lungs with worsening effusions in this patient with cardiac silhouette size enlargement.  Interstitial pulmonary edema versus interstitial infectious process must be considered.    2.  Stable findings as noted above.      Electronically signed by: Trev Dean MD  Date:    08/03/2022  Time:    12:04             Narrative:    EXAMINATION:  XR CHEST AP PORTABLE    CLINICAL HISTORY:  COVID-19;    COMPARISON:  July 22, 2022    FINDINGS:  All the lines and tubes that were present on the comparison study have been removed.  Worsening effusions and as well as worsening vascular congestion versus reticular interstitial changes throughout the lungs.  Cardiac silhouette size remains enlarged.  Stable median sternotomy wires, CABG changes, tortuosity of the aorta and degenerative changes of the spine.                               The EKG was ordered, reviewed, and independently interpreted by the ED provider.  Interpretation time: 11:59  Rate: 73 BPM  Rhythm: Sinus rhythm with occasional PVCs  Interpretation: LVH with repolarization abnormality. Prolonged QT. No STEMI.           The Emergency Provider reviewed the vital signs and test results, which are outlined above.    ED Discussion     2:21 PM: Reassessed pt at this time. Pt is in no respiratory distress, but still have an active  cough, grand daughter at bedside, pulse ox 100 %, no indication for admission, needs to follow up as scheduled for dialysis, both verbalized understanding. Discussed with pt all pertinent ED information and results. Discussed pt dx and plan of tx. Gave pt all f/u and return to the ED instructions. All questions and concerns were addressed at this time. Pt expresses understanding of information and instructions, and is comfortable with plan to discharge. Pt is stable for discharge.    I discussed with patient and/or family/caretaker that evaluation in the ED does not suggest any emergent or life threatening medical conditions requiring immediate intervention beyond what was provided in the ED, and I believe patient is safe for discharge.  Regardless, an unremarkable evaluation in the ED does not preclude the development or presence of a serious of life threatening condition. As such, patient was instructed to return immediately for any worsening or change in current symptoms.         ED Medication(s):  Medications   furosemide injection 80 mg (80 mg Intravenous Given 8/3/22 5273)        Follow-up Information     Rose Parks MD. Schedule an appointment as soon as possible for a visit in 2 days.    Specialty: Internal Medicine  Why: Return to the Emergency Room, If symptoms worsen  Contact information:  [address] [facility] [address]  [phone]                        New Prescriptions    No medications on file         Medical Decision Making    Medical Decision Making:   Clinical Tests:   Lab Tests: Ordered and Reviewed  Radiological Study: Ordered and Reviewed  Medical Tests: Ordered and Reviewed           Scribe Attestation:   Scribe #1: I performed the above scribed service and the documentation accurately describes the services I performed. I attest to the accuracy of the note.    Attending:   Physician Attestation Statement for Scribe #1: I, Lizzette Holder MD, personally performed the services  described in this documentation, as scribed by Jeromy Mendiola, in my presence, and it is both accurate and complete.          Clinical Impression       ICD-10-CM ICD-9-CM   1. Lab test positive for detection of COVID-19 virus  U07.1 079.89   2. COVID-19  U07.1 079.89   3. ESRD (end stage renal disease) on dialysis  N18.6 585.6    Z99.2 V45.11   4. Chronic hypertension  I10 401.9   5. Hx of CABG  Z95.1 V45.81   6. Chronic pulmonary edema  J81.1 514       Disposition:   Disposition: Discharged  Condition: Stable         Lizzette Holder MD  08/03/22 1527

## 2022-08-03 NOTE — PHARMACY MED REC
"Admission Medication History     The home medication history was taken by Randal Don.    You may go to "Admission" then "Reconcile Home Medications" tabs to review and/or act upon these items.      The home medication list has been updated by the Pharmacy department.    Please read ALL comments highlighted in yellow.    Please address this information as you see fit.     Feel free to contact us if you have any questions or require assistance.      Medications listed below were obtained from: Patient/family  (Not in a hospital admission)    Randal Don  [id number]      Current Outpatient Medications on File Prior to Encounter   Medication Sig Dispense Refill Last Dose    aspirin (ECOTRIN) 81 MG EC tablet Take 1 tablet (81 mg total) by mouth once daily. 90 tablet 1 8/3/2022 at Unknown time    clopidogreL (PLAVIX) 75 mg tablet Take 1 tablet (75 mg total) by mouth once daily. 90 tablet 3 8/3/2022 at Unknown time    insulin NPH/Reg human (HUMULIN, 70/30,) 100 unit/mL (70-30) InPn pen To do 28 units subq in AM and 8 units subq in PM.  Dose to be done within 15 min before or after meal. (Patient taking differently: To do 28 units subq in AM and 8 units subq in PM.  Dose to be done within 15 min before or after meal.) 3 each 1 8/2/2022 at Unknown time    metoprolol tartrate (LOPRESSOR) 50 MG tablet Take 1 tablet (50 mg total) by mouth 2 (two) times daily. 180 tablet 1 8/3/2022 at Unknown time    semaglutide (OZEMPIC) 0.25 mg or 0.5 mg(2 mg/1.5 mL) pen injector Inject 0.25 mg into the skin every 7 days. 1 pen 1 Past Week at Unknown time    sevelamer carbonate (RENVELA) 800 mg Tab TAKE 2 TABLETS BY MOUTH THREE TIMES DAILY 180 tablet 0 8/3/2022 at Unknown time    telmisartan (MICARDIS) 20 MG Tab Take 1 tablet (20 mg total) by mouth once daily. 90 tablet 1 8/3/2022 at Unknown time    traMADoL (ULTRAM) 50 mg tablet Take 1 tablet (50 mg total) by mouth every 8 (eight) hours as needed for Pain. 10 tablet " 0 8/3/2022 at Unknown time    ACCU-CHEK ARDEN PLUS METER Misc USE TO TEST TWICE DAILY  0     azelastine (ASTELIN) 137 mcg (0.1 %) nasal spray SMARTSI Spray(s) Both Nares Twice Daily PRN       benzonatate (TESSALON) 200 MG capsule Take 200 mg by mouth 3 (three) times daily.       blood sugar diagnostic Strp To check BG 2 times daily, to use with insurance preferred meter 100 each 6     cyanocobalamin (VITAMIN B-12) 1000 MCG tablet Take 100 mcg by mouth once daily.       folic acid (FOLVITE) 1 MG tablet Take 1 mg by mouth once daily.       lancets Misc To check BG 2 times daily, to use with insurance preferred meter 100 each 6     triamcinolone acetonide 0.1% (KENALOG) 0.1 % cream Apply topically 2 (two) times daily. 80 g 2     [DISCONTINUED] isosorbide-hydrALAZINE 20-37.5 mg (BIDIL) 20-37.5 mg Tab Take 1 tablet by mouth 3 (three) times daily. 90 tablet 1                            .

## 2022-08-03 NOTE — FIRST PROVIDER EVALUATION
Medical screening exam completed.  I have conducted a focused provider triage encounter, findings are as follows:    Brief history of present illness:  Sob, covid positive today at urgent care.  Xrays today showed bilat pleural effusions.  Heart sx 2 weeks ago    Vitals:    08/03/22 1116   BP: (!) 121/59   BP Location: Right arm   Patient Position: Sitting   Pulse: 74   Resp: 20   Temp: 98.9 °F (37.2 °C)   TempSrc: Oral   SpO2: 99%       Pertinent physical exam:  Uncomfortable, alert        Preliminary workup initiated; this workup will be continued and followed by the physician or advanced practice provider that is assigned to the patient when roomed.

## 2022-08-03 NOTE — ED NOTES
Bed: 02  Expected date: 8/3/22  Expected time:   Means of arrival:   Comments:  andres Monzon when clean

## 2022-08-04 ENCOUNTER — NURSE TRIAGE (OUTPATIENT)
Dept: ADMINISTRATIVE | Facility: CLINIC | Age: 77
End: 2022-08-04
Payer: MEDICARE

## 2022-08-04 ENCOUNTER — TELEPHONE (OUTPATIENT)
Dept: DIABETES | Facility: CLINIC | Age: 77
End: 2022-08-04
Payer: MEDICARE

## 2022-08-04 ENCOUNTER — INFUSION (OUTPATIENT)
Dept: INFECTIOUS DISEASES | Facility: HOSPITAL | Age: 77
End: 2022-08-04
Attending: EMERGENCY MEDICINE
Payer: MEDICARE

## 2022-08-04 VITALS
RESPIRATION RATE: 18 BRPM | TEMPERATURE: 97 F | SYSTOLIC BLOOD PRESSURE: 172 MMHG | HEART RATE: 80 BPM | OXYGEN SATURATION: 98 % | DIASTOLIC BLOOD PRESSURE: 76 MMHG

## 2022-08-04 DIAGNOSIS — U07.1 LAB TEST POSITIVE FOR DETECTION OF COVID-19 VIRUS: ICD-10-CM

## 2022-08-04 DIAGNOSIS — U07.1 COVID-19: Primary | ICD-10-CM

## 2022-08-04 PROCEDURE — M0222 HC IV INJECTION, BEBTELOVIMAB, INCL POST ADMIN MONIT: HCPCS | Performed by: INTERNAL MEDICINE

## 2022-08-04 PROCEDURE — 63600175 PHARM REV CODE 636 W HCPCS: Performed by: INTERNAL MEDICINE

## 2022-08-04 RX ORDER — ACETAMINOPHEN 325 MG/1
650 TABLET ORAL
Status: ACTIVE | OUTPATIENT
Start: 2022-08-04 | End: 2022-08-05

## 2022-08-04 RX ORDER — EPINEPHRINE 0.3 MG/.3ML
0.3 INJECTION SUBCUTANEOUS
Status: DISCONTINUED | OUTPATIENT
Start: 2022-08-04 | End: 2022-08-07 | Stop reason: HOSPADM

## 2022-08-04 RX ORDER — ONDANSETRON 4 MG/1
4 TABLET, ORALLY DISINTEGRATING ORAL
Status: ACTIVE | OUTPATIENT
Start: 2022-08-04 | End: 2022-08-05

## 2022-08-04 RX ORDER — ALBUTEROL SULFATE 90 UG/1
2 AEROSOL, METERED RESPIRATORY (INHALATION)
Status: DISCONTINUED | OUTPATIENT
Start: 2022-08-04 | End: 2022-08-07 | Stop reason: HOSPADM

## 2022-08-04 RX ORDER — BEBTELOVIMAB 87.5 MG/ML
175 INJECTION, SOLUTION INTRAVENOUS
Status: COMPLETED | OUTPATIENT
Start: 2022-08-04 | End: 2022-08-04

## 2022-08-04 RX ORDER — DIPHENHYDRAMINE HYDROCHLORIDE 50 MG/ML
25 INJECTION INTRAMUSCULAR; INTRAVENOUS
Status: ACTIVE | OUTPATIENT
Start: 2022-08-04 | End: 2022-08-05

## 2022-08-04 RX ADMIN — BEBTELOVIMAB 175 MG: 87.5 INJECTION, SOLUTION INTRAVENOUS at 01:08

## 2022-08-04 NOTE — TELEPHONE ENCOUNTER
Tried contacting patient daughter Elizabeth (mailbox full. Called alternated number spoke with Myriam she informed me patient maybe at dialysis, but she would try reaching out to her. I informed her to have patient look at [facility] for scheduled appointment.

## 2022-08-04 NOTE — PLAN OF CARE
Patient was declined by [facility].  [facility] accepted.       08/04/22 0945   Post-Acute Status   Post-Acute Authorization Home [facility]   Home Health Status Set-up Complete/Auth obtained   Discharge Plan   Discharge Plan [facility]

## 2022-08-04 NOTE — PLAN OF CARE
[facility] - Telemetry (Hospital)  Discharge Final Note    Primary Care Provider: Rose Parks MD    Expected Discharge Date: 7/27/2022    Final Discharge Note (most recent)     Final Note - 08/04/22 0946        Final Note    Assessment Type Final Discharge Note     Anticipated Discharge Disposition Home-Health Care [facility]     Hospital Resources/Appts/Education Provided Appointments scheduled and added to AVS        Post-Acute Status    Post-Acute Authorization Home Health     Home Health Status Set-up Complete/Auth obtained   [facility]                Important Message from Medicare             Contact Info     Sanju Locke MD   Specialty: Cardiothoracic Surgery    [address] [place] [address] [address] [place] [address]   Phone: [phone]       Next Steps: Follow up on 8/4/2022    [facility] [place]   Specialty: Home Health Services, Home Therapy Services    [address] [facility]  [place] [address]   Phone: [phone]       Next Steps: Follow up    Instructions: Home Health: Agency will call and schedule an apppointment to visit.

## 2022-08-04 NOTE — TELEPHONE ENCOUNTER
----- Message from Natty Mistry NP sent at 8/4/2022 10:19 AM CDT -----  Can we please reschedule her to next available appt, only Tuesdays and Thursday availability.    ----- Message -----  From: David Watkins PA-C  Sent: 8/4/2022   9:24 AM CDT  To: JESSENIA Scott, forwarding this back to you for update. We did not resolve  ----- Message -----  From: Shantel Don MA  Sent: 8/3/2022   4:54 PM CDT  To: David Watkins PA-C    No cord in office. Called Elizabeth Monzon (Daughter) [phone] (Mobile) no answer.       ----- Message -----  From: David Watkins PA-C  Sent: 8/2/2022  12:09 PM CDT  To: Conchita Garcia RD, CDE, #    Robert Lewis,     Thanks for update. The staff did try to call her but no answer. Her insurance would have covered one but if she lost it there is not much we can do. They pay for one  a year. If we get a call back we can explain this to her. I also am having the staff dig through the office to see if they can find one.     Shantel, did you find a cord? Also let her daughter know the cord comes in kit with new  and the insurance only pays for 1 per year. If she has lost the cord, the $10 replacement cost from [employer] is minimum compared to new system. Also, if we find one just give it to her.   They wont be able to come on Monday so if they call back just offer next available appt with natty in pm or on Tues / Thurs Char FYI again. Ang, just looping you in too     ----- Message -----  From: Debbie Lopez RN  Sent: 8/2/2022  10:45 AM CDT  To: Conchita Garcia RD, CDE, #    David and Conchita,     First of all, thank you both for responding. 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 I have spoken with the , [place], at [facility] and asked her to have the field nurse instruct Mrs Mackenzie that she has to choose a family member  "to assist her with managing her CGM. This is going to be a conflict as I have told her this in the past and she advised she would not allow Elizabeth to help manage it as she is crazy and cannot take care of herself. Sorry, but just repeating what she told me.     I told Mrs Johnson they could buy a cord on [employer] and googled it while on the phone with her and told her it would be around $10, but she was insistent that Mrs Mackenzie's insurance needs to cover this and I feel like that could be just another excuse for Mrs Mackenzie to continue to avoid monitoring. If you are familiar with her at all you know she has had numerous glucometers ordered related to being blind and losing them repeatedly.     I hope we can get her and Elizabeth to work together using her Free Style Piper to monitor her sugars routinely. I will ask the home health nurse to reinforce need to allow Elizabeth to assist her and help them designate a place in the home to keep all the supplies stored.     Thanks again,     Debbie  ----- Message -----  From: David Watkins PA-C  Sent: 8/2/2022   9:37 AM CDT  To: Conchita Garcia RD, CDE, #    Hi Conchita Lewis, and staff,     Natty is out until Thursday and I am covering her messages.     Conchita, I will place a referral for you. Will you please reach out to her to schedule since sounds like family may need some education training as they assume care?     Staff, please call patient family and advise them that the Piper sensor will just peel off like a bandaid and they can discard. We will also move up their appt with Natty to Monday at 9a - put "Move up appt per BM". Please confirm with family that they can come that soon. As for the cord, they can purchase a cord on amazon for approx 10 dollars. We also can look around the gro[place] and see if we have an extra cord they can come . Offer them a NV for piper placement and tutorial today with patient and caregiver. If they are going to wait until " "Monday, that is fine but need to check her blood sugar via finger sticks and bring records to see Natty next week. Please make sure they have meter, strips, and lancets    Debbie, Thank you for letting us know. Will attempt to coordinate care     Natty, looping you in as FYSANDY. May consider CeQur / Dex on smart phone since legally blind       ----- Message -----  From: Shantel Don MA  Sent: 8/2/2022   9:00 AM CDT  To: David Watkins PA-C      ----- Message -----  From: Debbie Lopez RN  Sent: 8/1/2022   4:13 PM CDT  To: Conchita Garcia RD, CDE, #    Mrs Mackenzie was recently admitted to [facility] with diagnosis of MI. She had a CABG X 2 on 7/20/22. I just spoke with her daughter, Elizabeth, and they have not been checking her blood sugars since her hospital d/c on 7/27/22 because they cannot find the "cord" to charge her Free Style Piper reader. Elizabeth reports they want to have this reordered. I am completely unfamiliar with the Piper so have no understanding of what they need. Also, she has been wearing her current CGM sensor to the back of her L arm since before she went into the hospital and still has it on. I am going to contact [facility] to see if I can get the visiting nurse to remove it. Her daughter, Elizabeth, is unwilling to, stating she is not sure how to do it.     They need teaching on how to manage her CGM. Elizabeth does not want to become responsible to manage it, but someone needs to be identified as a caregiver at home for this as Mrs Mackenzie is legally blind and cannot do it herself. Do I need to get her an appt with an Diab dietician for this? Conchita Garcia is on her care team so I am copying her on this message.     They have been giving her Hum 70/30 28 U in the AM's and 8 U in the PM's. She had her last dose of Ozempic on Wed, 7/27/22, so I encouraged Elizabeth to continue to give on Wed's starting this week.     Natty, her follow up with you is not until 9/14/22. If " you have a sooner appt she could benefit from being seen sooner since they are not currently checking her blood sugar.    Thank you all and please message me with any questions,     Debbie Lopez RN, [facility]  Outpatient Case Management  [phone]  Ext [phone]  [email or web address]

## 2022-08-04 NOTE — TELEPHONE ENCOUNTER
2nd attempt was successful spoke to patient daughter Elizabeth she informed me that patient has COVID. Appointment scheduled Tuesday, Aug 30 at 11:00 AM.     When asked she informed me that they DO NOT use MyChart.

## 2022-08-05 NOTE — TELEPHONE ENCOUNTER
Pt with complaints of cough, covid+, had antibody infusion today.  Denies fever at this time per her daughter.  Care advice states to call pcp in 24 hours.  All questions answered.    Reason for Disposition   [1] Continuous (nonstop) coughing interferes with work or school AND [2] no improvement using cough treatment per Care Advice    Additional Information   Negative: SEVERE difficulty breathing (e.g., struggling for each breath, speaks in single words)   Negative: Difficult to awaken or acting confused (e.g., disoriented, slurred speech)   Negative: Bluish (or gray) lips or face now   Negative: Shock suspected (e.g., cold/pale/clammy skin, too weak to stand, low BP, rapid pulse)   Negative: Sounds like a life-threatening emergency to the triager   Negative: SEVERE or constant chest pain or pressure  (Exception: Mild central chest pain, present only when coughing.)   Negative: MODERATE difficulty breathing (e.g., speaks in phrases, SOB even at rest, pulse 100-120)   Negative: [1] Headache AND [2] stiff neck (can't touch chin to chest)   Negative: Oxygen level (e.g., pulse oximetry) 90 percent or lower   Negative: Chest pain or pressure   Negative: Patient sounds very sick or weak to the triager   Negative: MILD difficulty breathing (e.g., minimal/no SOB at rest, SOB with walking, pulse <100)   Negative: Fever > 103 F (39.4 C)   Negative: [1] Fever > 101 F (38.3 C) AND [2] age > 60 years   Negative: [1] Fever > 100.0 F (37.8 C) AND [2] bedridden (e.g., nursing home patient, CVA, chronic illness, recovering from surgery)   Negative: HIGH RISK for severe COVID complications (e.g., weak immune system, age > 64 years, obesity with BMI > 25, pregnant, chronic lung disease or other chronic medical condition)  (Exception: Already seen by PCP and no new or worsening symptoms.)   Negative: [1] HIGH RISK patient AND [2] influenza is widespread in the community AND [3] ONE OR MORE respiratory symptoms:  cough, sore throat, runny or stuffy nose   Negative: [1] HIGH RISK patient AND [2] influenza exposure within the last 7 days AND [3] ONE OR MORE respiratory symptoms: cough, sore throat, runny or stuffy nose   Negative: Oxygen level (e.g., pulse oximetry) 91 to 94 percent   Negative: Fever present > 3 days (72 hours)   Negative: [1] Fever returns after gone for over 24 hours AND [2] symptoms worse or not improved    Protocols used: CORONAVIRUS (COVID-19) DIAGNOSED OR [id number]

## 2022-08-06 ENCOUNTER — NURSE TRIAGE (OUTPATIENT)
Dept: ADMINISTRATIVE | Facility: CLINIC | Age: 77
End: 2022-08-06
Payer: MEDICARE

## 2022-08-06 NOTE — TELEPHONE ENCOUNTER
Pt's daughter called about mom coughing and triaged and she received the infusion and still has cough pt triaged and care advice is home care and went over some home care and to call us back if needed. Pt will reach out and try adding mucinex. Pt to call back if any fever or SOB or CP         aReason for Disposition   Cough    Additional Information   Negative: SEVERE difficulty breathing (e.g., struggling for each breath, speaks in single words)   Negative: Bluish (or gray) lips or face now   Negative: [1] Difficulty breathing AND [2] exposure to flames, smoke, or fumes   Negative: [1] Stridor AND [2] difficulty breathing   Negative: Sounds like a life-threatening emergency to the triager   Negative: [1] MODERATE difficulty breathing (e.g., speaks in phrases, SOB even at rest, pulse 100-120) AND [2] still present when not coughing   Negative: Chest pain  (Exception: MILD central chest pain, present only when coughing)   Negative: Patient sounds very sick or weak to the triager   Negative: [1] MILD difficulty breathing (e.g., minimal/no SOB at rest, SOB with walking, pulse <100) AND [2] still present when not coughing   Negative: [1] Coughed up blood AND [2] > 1 tablespoon (15 ml) (Exception: blood-tinged sputum)   Negative: Fever > 103 F (39.4 C)   Negative: [1] Fever > 101 F (38.3 C) AND [2] age > 60 years   Negative: [1] Fever > 100.0 F (37.8 C) AND [2] bedridden (e.g., nursing home patient, CVA, chronic illness, recovering from surgery)   Negative: [1] Fever > 100.0 F (37.8 C) AND [2] diabetes mellitus or weak immune system (e.g., HIV positive, cancer chemo, splenectomy, organ transplant, chronic steroids)   Negative: Wheezing is present   Negative: SEVERE coughing spells (e.g., whooping sound after coughing, vomiting after coughing)   Negative: [1] Continuous (nonstop) coughing interferes with work or school AND [2] no improvement using cough treatment per Care Advice   Negative: Coughing up  colt-colored (reddish-brown) sputum   Negative: Fever present > 3 days (72 hours)   Negative: [1] Fever returns after gone for over 24 hours AND [2] symptoms worse or not improved   Negative: [1] Using nasal washes and pain medicine > 24 hours AND [2] sinus pain (around cheekbone or eye) persists   Negative: Earache   Negative: [1] Known COPD or other severe lung disease (i.e., bronchiectasis, cystic fibrosis, lung surgery) AND [2] worsening symptoms (i.e., increased sputum purulence or amount, increased breathing difficulty   Negative: Cough has been present for > 3 weeks   Negative: [1] Nasal discharge AND [2] present > 10 days   Negative: [1] Coughed up blood-tinged sputum AND [2] more than once   Negative: Exposure to TB (Tuberculosis)    Protocols used: COUGH - ACUTE [id number]

## 2022-08-07 ENCOUNTER — HOSPITAL ENCOUNTER (OUTPATIENT)
Facility: HOSPITAL | Age: 77
Discharge: HOME HEALTH | End: 2022-08-09
Attending: EMERGENCY MEDICINE | Admitting: INTERNAL MEDICINE
Payer: MEDICARE

## 2022-08-07 DIAGNOSIS — U07.1 COVID-19 VIRUS INFECTION: ICD-10-CM

## 2022-08-07 DIAGNOSIS — R79.89 TROPONIN LEVEL ELEVATED: Primary | ICD-10-CM

## 2022-08-07 DIAGNOSIS — N18.6 ESRD (END STAGE RENAL DISEASE): ICD-10-CM

## 2022-08-07 DIAGNOSIS — U07.1 COVID: ICD-10-CM

## 2022-08-07 DIAGNOSIS — R07.9 CHEST PAIN: ICD-10-CM

## 2022-08-07 LAB
ALBUMIN SERPL BCP-MCNC: 3 G/DL (ref 3.5–5.2)
ALP SERPL-CCNC: 87 U/L (ref 55–135)
ALT SERPL W/O P-5'-P-CCNC: 8 U/L (ref 10–44)
ANION GAP SERPL CALC-SCNC: 15 MMOL/L (ref 8–16)
APTT BLDCRRT: 24.5 SEC (ref 21–32)
APTT BLDCRRT: 34.3 SEC (ref 21–32)
AST SERPL-CCNC: 26 U/L (ref 10–40)
BASOPHILS # BLD AUTO: 0.02 K/UL (ref 0–0.2)
BASOPHILS NFR BLD: 0.3 % (ref 0–1.9)
BILIRUB SERPL-MCNC: 0.7 MG/DL (ref 0.1–1)
BNP SERPL-MCNC: >4900 PG/ML (ref 0–99)
BUN SERPL-MCNC: 27 MG/DL (ref 8–23)
CALCIUM SERPL-MCNC: 9.1 MG/DL (ref 8.7–10.5)
CHLORIDE SERPL-SCNC: 102 MMOL/L (ref 95–110)
CO2 SERPL-SCNC: 19 MMOL/L (ref 23–29)
CREAT SERPL-MCNC: 4.2 MG/DL (ref 0.5–1.4)
DIFFERENTIAL METHOD: ABNORMAL
EOSINOPHIL # BLD AUTO: 0 K/UL (ref 0–0.5)
EOSINOPHIL NFR BLD: 0.1 % (ref 0–8)
ERYTHROCYTE [DISTWIDTH] IN BLOOD BY AUTOMATED COUNT: 16.3 % (ref 11.5–14.5)
EST. GFR  (NO RACE VARIABLE): 10 ML/MIN/1.73 M^2
GLUCOSE SERPL-MCNC: 149 MG/DL (ref 70–110)
HCT VFR BLD AUTO: 27.4 % (ref 37–48.5)
HGB BLD-MCNC: 8.8 G/DL (ref 12–16)
IMM GRANULOCYTES # BLD AUTO: 0.04 K/UL (ref 0–0.04)
IMM GRANULOCYTES NFR BLD AUTO: 0.5 % (ref 0–0.5)
INR PPP: 1 (ref 0.8–1.2)
LYMPHOCYTES # BLD AUTO: 0.6 K/UL (ref 1–4.8)
LYMPHOCYTES NFR BLD: 8.3 % (ref 18–48)
MCH RBC QN AUTO: 30.2 PG (ref 27–31)
MCHC RBC AUTO-ENTMCNC: 32.1 G/DL (ref 32–36)
MCV RBC AUTO: 94 FL (ref 82–98)
MONOCYTES # BLD AUTO: 0.5 K/UL (ref 0.3–1)
MONOCYTES NFR BLD: 6.1 % (ref 4–15)
NEUTROPHILS # BLD AUTO: 6.4 K/UL (ref 1.8–7.7)
NEUTROPHILS NFR BLD: 84.7 % (ref 38–73)
NRBC BLD-RTO: 0 /100 WBC
PLATELET # BLD AUTO: 258 K/UL (ref 150–450)
PMV BLD AUTO: 8.8 FL (ref 9.2–12.9)
POCT GLUCOSE: 200 MG/DL (ref 70–110)
POTASSIUM SERPL-SCNC: 4.5 MMOL/L (ref 3.5–5.1)
PROT SERPL-MCNC: 6.7 G/DL (ref 6–8.4)
PROTHROMBIN TIME: 11 SEC (ref 9–12.5)
RBC # BLD AUTO: 2.91 M/UL (ref 4–5.4)
SODIUM SERPL-SCNC: 136 MMOL/L (ref 136–145)
TROPONIN I SERPL DL<=0.01 NG/ML-MCNC: 0.23 NG/ML (ref 0–0.03)
TROPONIN I SERPL DL<=0.01 NG/ML-MCNC: 0.25 NG/ML (ref 0–0.03)
WBC # BLD AUTO: 7.6 K/UL (ref 3.9–12.7)

## 2022-08-07 PROCEDURE — 93010 EKG 12-LEAD: ICD-10-PCS | Mod: ,,, | Performed by: INTERNAL MEDICINE

## 2022-08-07 PROCEDURE — 96375 TX/PRO/DX INJ NEW DRUG ADDON: CPT

## 2022-08-07 PROCEDURE — 85730 THROMBOPLASTIN TIME PARTIAL: CPT | Performed by: EMERGENCY MEDICINE

## 2022-08-07 PROCEDURE — 96368 THER/DIAG CONCURRENT INF: CPT

## 2022-08-07 PROCEDURE — 25000003 PHARM REV CODE 250: Performed by: EMERGENCY MEDICINE

## 2022-08-07 PROCEDURE — 85610 PROTHROMBIN TIME: CPT | Performed by: EMERGENCY MEDICINE

## 2022-08-07 PROCEDURE — 25000003 PHARM REV CODE 250: Performed by: INTERNAL MEDICINE

## 2022-08-07 PROCEDURE — 99291 CRITICAL CARE FIRST HOUR: CPT | Mod: 25

## 2022-08-07 PROCEDURE — 84484 ASSAY OF TROPONIN QUANT: CPT | Mod: 91 | Performed by: INTERNAL MEDICINE

## 2022-08-07 PROCEDURE — G0378 HOSPITAL OBSERVATION PER HR: HCPCS

## 2022-08-07 PROCEDURE — 96365 THER/PROPH/DIAG IV INF INIT: CPT

## 2022-08-07 PROCEDURE — 36415 COLL VENOUS BLD VENIPUNCTURE: CPT | Performed by: INTERNAL MEDICINE

## 2022-08-07 PROCEDURE — 80053 COMPREHEN METABOLIC PANEL: CPT | Performed by: EMERGENCY MEDICINE

## 2022-08-07 PROCEDURE — 93005 ELECTROCARDIOGRAM TRACING: CPT

## 2022-08-07 PROCEDURE — 99220 PR INITIAL OBSERVATION CARE,LEVL III: CPT | Mod: ,,, | Performed by: INTERNAL MEDICINE

## 2022-08-07 PROCEDURE — 99220 PR INITIAL OBSERVATION CARE,LEVL III: ICD-10-PCS | Mod: ,,, | Performed by: INTERNAL MEDICINE

## 2022-08-07 PROCEDURE — 84484 ASSAY OF TROPONIN QUANT: CPT | Performed by: EMERGENCY MEDICINE

## 2022-08-07 PROCEDURE — 96365 THER/PROPH/DIAG IV INF INIT: CPT | Mod: 59

## 2022-08-07 PROCEDURE — 63600175 PHARM REV CODE 636 W HCPCS: Performed by: EMERGENCY MEDICINE

## 2022-08-07 PROCEDURE — 85730 THROMBOPLASTIN TIME PARTIAL: CPT | Mod: 91 | Performed by: INTERNAL MEDICINE

## 2022-08-07 PROCEDURE — 96366 THER/PROPH/DIAG IV INF ADDON: CPT

## 2022-08-07 PROCEDURE — 83880 ASSAY OF NATRIURETIC PEPTIDE: CPT | Performed by: EMERGENCY MEDICINE

## 2022-08-07 PROCEDURE — 93010 ELECTROCARDIOGRAM REPORT: CPT | Mod: ,,, | Performed by: INTERNAL MEDICINE

## 2022-08-07 PROCEDURE — 85025 COMPLETE CBC W/AUTO DIFF WBC: CPT | Performed by: EMERGENCY MEDICINE

## 2022-08-07 PROCEDURE — 94761 N-INVAS EAR/PLS OXIMETRY MLT: CPT

## 2022-08-07 PROCEDURE — 63600175 PHARM REV CODE 636 W HCPCS: Mod: TB | Performed by: INTERNAL MEDICINE

## 2022-08-07 RX ORDER — MORPHINE SULFATE 4 MG/ML
4 INJECTION, SOLUTION INTRAMUSCULAR; INTRAVENOUS
Status: COMPLETED | OUTPATIENT
Start: 2022-08-07 | End: 2022-08-07

## 2022-08-07 RX ORDER — ACETAMINOPHEN 325 MG/1
650 TABLET ORAL EVERY 8 HOURS PRN
Status: DISCONTINUED | OUTPATIENT
Start: 2022-08-07 | End: 2022-08-09 | Stop reason: HOSPADM

## 2022-08-07 RX ORDER — INSULIN ASPART 100 [IU]/ML
0-5 INJECTION, SOLUTION INTRAVENOUS; SUBCUTANEOUS
Status: DISCONTINUED | OUTPATIENT
Start: 2022-08-07 | End: 2022-08-09 | Stop reason: HOSPADM

## 2022-08-07 RX ORDER — SODIUM CHLORIDE 0.9 % (FLUSH) 0.9 %
10 SYRINGE (ML) INJECTION
Status: DISCONTINUED | OUTPATIENT
Start: 2022-08-07 | End: 2022-08-09 | Stop reason: HOSPADM

## 2022-08-07 RX ORDER — CLOPIDOGREL BISULFATE 75 MG/1
75 TABLET ORAL DAILY
Status: DISCONTINUED | OUTPATIENT
Start: 2022-08-08 | End: 2022-08-09 | Stop reason: HOSPADM

## 2022-08-07 RX ORDER — ONDANSETRON 2 MG/ML
4 INJECTION INTRAMUSCULAR; INTRAVENOUS EVERY 6 HOURS PRN
Status: DISCONTINUED | OUTPATIENT
Start: 2022-08-07 | End: 2022-08-09 | Stop reason: HOSPADM

## 2022-08-07 RX ORDER — ONDANSETRON 2 MG/ML
4 INJECTION INTRAMUSCULAR; INTRAVENOUS
Status: COMPLETED | OUTPATIENT
Start: 2022-08-07 | End: 2022-08-07

## 2022-08-07 RX ORDER — IBUPROFEN 200 MG
16 TABLET ORAL
Status: DISCONTINUED | OUTPATIENT
Start: 2022-08-07 | End: 2022-08-09 | Stop reason: HOSPADM

## 2022-08-07 RX ORDER — GLUCAGON 1 MG
1 KIT INJECTION
Status: DISCONTINUED | OUTPATIENT
Start: 2022-08-07 | End: 2022-08-09 | Stop reason: HOSPADM

## 2022-08-07 RX ORDER — HEPARIN SODIUM,PORCINE/D5W 25000/250
0-40 INTRAVENOUS SOLUTION INTRAVENOUS CONTINUOUS
Status: DISCONTINUED | OUTPATIENT
Start: 2022-08-07 | End: 2022-08-09 | Stop reason: HOSPADM

## 2022-08-07 RX ORDER — GUAIFENESIN/DEXTROMETHORPHAN 100-10MG/5
5 SYRUP ORAL EVERY 6 HOURS
Status: DISCONTINUED | OUTPATIENT
Start: 2022-08-07 | End: 2022-08-09 | Stop reason: HOSPADM

## 2022-08-07 RX ORDER — IBUPROFEN 200 MG
24 TABLET ORAL
Status: DISCONTINUED | OUTPATIENT
Start: 2022-08-07 | End: 2022-08-09 | Stop reason: HOSPADM

## 2022-08-07 RX ORDER — METOPROLOL TARTRATE 50 MG/1
50 TABLET ORAL 2 TIMES DAILY
Status: DISCONTINUED | OUTPATIENT
Start: 2022-08-07 | End: 2022-08-09 | Stop reason: HOSPADM

## 2022-08-07 RX ORDER — ASPIRIN 81 MG/1
81 TABLET ORAL DAILY
Status: DISCONTINUED | OUTPATIENT
Start: 2022-08-08 | End: 2022-08-09 | Stop reason: HOSPADM

## 2022-08-07 RX ORDER — ASPIRIN 325 MG
325 TABLET ORAL
Status: DISCONTINUED | OUTPATIENT
Start: 2022-08-07 | End: 2022-08-07

## 2022-08-07 RX ADMIN — REMDESIVIR 200 MG: 100 INJECTION, POWDER, LYOPHILIZED, FOR SOLUTION INTRAVENOUS at 04:08

## 2022-08-07 RX ADMIN — NITROGLYCERIN 1 INCH: 20 OINTMENT TOPICAL at 09:08

## 2022-08-07 RX ADMIN — ONDANSETRON 4 MG: 2 INJECTION INTRAMUSCULAR; INTRAVENOUS at 10:08

## 2022-08-07 RX ADMIN — MORPHINE SULFATE 4 MG: 4 INJECTION INTRAVENOUS at 10:08

## 2022-08-07 RX ADMIN — HEPARIN SODIUM AND DEXTROSE 12 UNITS/KG/HR: 10000; 5 INJECTION INTRAVENOUS at 11:08

## 2022-08-07 RX ADMIN — METOPROLOL TARTRATE 50 MG: 50 TABLET, FILM COATED ORAL at 08:08

## 2022-08-07 RX ADMIN — GUAIFENESIN AND DEXTROMETHORPHAN 5 ML: 100; 10 SYRUP ORAL at 05:08

## 2022-08-07 NOTE — ED PROVIDER NOTES
Encounter Date: 8/7/2022       History     Chief Complaint   Patient presents with    Chest Pain     Chest pain, bypass 3 weeks ago.  COVID +     HPI   87-year-old  female 3 weeks post CABG with a positive COVID test on 08/03 presenting with chest tightness overnight.  She has cough with some intermittent shortness of breath.  She denies any nausea vomiting or diaphoresis.  She denies any chest pain simply tightness.  Denies any fevers or chills.  Patient denies any nausea vomiting.  No diaphoresis.    Review of patient's allergies indicates:   Allergen Reactions    Codeine Swelling    Darvon [propoxyphene] Swelling    Atorvastatin      Muscle twitching     Past Medical History:   Diagnosis Date    Acute hypoxemic respiratory failure 11/26/2018    Anemia     Arthritis     back, hand, knees    Back pain     Cataract     CKD stage G4/A3, GFR 15 - 29 and albumin creatinine ratio >300 mg/g     GFR 40% Jan 2014 and 33% ub 3/2014 ([facility])    Coronary artery disease involving native coronary artery of native heart 11/30/2018    Diabetic retinopathy     DM (diabetes mellitus) type II controlled, neurological manifestation     Exudative age-related macular degeneration of left eye with active choroidal neovascularization 2/5/2019    GERD (gastroesophageal reflux disease)     Glaucoma     Heart failure     Hyperlipidemia     Hypertension     Non-ST elevation MI (NSTEMI) 11/28/2018    NSTEMI (non-ST elevated myocardial infarction) 11/27/2018    Peripheral neuropathy     Polyneuropathy     Proteinuria     >6 mo     Seizures     [facility] 1/2014 -dick CT NRI showed small vessel    Stroke 2012,2014    Tobacco dependence     Type 2 diabetes with peripheral circulatory disorder, controlled      Past Surgical History:   Procedure Laterality Date    BREAST LUMPECTOMY Left     benign, when patient was 15 years old    BREAST MASS EXCISION      ,benign, age 15.    CATHETERIZATION OF BOTH LEFT AND  RIGHT HEART N/A 11/28/2018    Procedure: CATHETERIZATION, HEART, BOTH LEFT AND RIGHT;  Surgeon: Michelle Holman MD;  Location: [facility] CATH LAB;  Service: Cardiology;  Laterality: N/A;    CATHETERIZATION OF BOTH LEFT AND RIGHT HEART N/A 11/30/2018    Procedure: CATHETERIZATION, HEART, BOTH LEFT AND RIGHT;  Surgeon: Michelle Holman MD;  Location: [facility] CATH LAB;  Service: Cardiology;  Laterality: N/A;    CORONARY ARTERY BYPASS GRAFT (CABG) N/A 7/20/2022    Procedure: CORONARY ARTERY BYPASS GRAFT (CABG);  Surgeon: Sanju Locke MD;  Location: [facility] OR;  Service: Cardiothoracic;  Laterality: N/A;  2-VESSEL PUMP ASSIST WITH EPI-AORTIC ULTRASOUND    ENDOSCOPIC HARVEST OF VEIN Left 7/20/2022    Procedure: SURGICAL PROCUREMENT, VEIN, ENDOSCOPIC;  Surgeon: Sanju Locke MD;  Location: [facility];  Service: Cardiothoracic;  Laterality: Left;    HYSTERECTOMY  1982    INJECTION OF ANESTHETIC AGENT AROUND MULTIPLE INTERCOSTAL NERVES N/A 7/20/2022    Procedure: BLOCK, NERVE, INTERCOSTAL, 2 OR MORE;  Surgeon: Sanju Locke MD;  Location: [facility] OR;  Service: Cardiothoracic;  Laterality: N/A;  PARASTERNAL NERVE BLOCK    INSERTION OF SHUNT      LEFT HEART CATHETERIZATION Left 12/3/2018    Procedure: CATHETERIZATION, HEART, LEFT;  Surgeon: Michelle Holman MD;  Location: [facility] CATH LAB;  Service: Cardiology;  Laterality: Left;  LAD ATHERECTOMY STENT/ FEMORAL APPROACH    LEFT HEART CATHETERIZATION Left 7/13/2022    Procedure: CATHETERIZATION, HEART, LEFT;  Surgeon: Abhi Sloan MD;  Location: [facility] CATH LAB;  Service: Cardiology;  Laterality: Left;    OOPHORECTOMY      wrist cyst Right 1980s    dorsal wrist cyst     Family History   Problem Relation Age of Onset    Diabetes Mother     Hyperlipidemia Mother     Hypertension Mother     Heart disease Mother         MI    Muscular dystrophy Son     Cancer Maternal Grandmother         colon     Social History     Tobacco Use    Smoking status: Former Smoker      Packs/day: 1.50     Years: 30.00     Pack years: 45.00     Types: Cigarettes     Quit date: 1990     Years since quittin.5    Smokeless tobacco: Former User   Substance Use Topics    Alcohol use: No     Alcohol/week: 0.0 standard drinks    Drug use: No     Review of Systems   Constitutional: Negative for chills and fever.   HENT: Negative for congestion and rhinorrhea.    Respiratory: Positive for cough and shortness of breath.    Cardiovascular: Positive for chest pain. Negative for palpitations and leg swelling.   Gastrointestinal: Negative for diarrhea, nausea and vomiting.   All other systems reviewed and are negative.      Physical Exam     Initial Vitals   BP Pulse Resp Temp SpO2   22 0812 22 0812 22 0812 22 0859 22 0812   (!) 179/73 92 20 99.6 °F (37.6 °C) 98 %      MAP       --                Physical Exam  Nursing Notes and Vital Signs Reviewed.  Constitutional: Patient is in no acute distress. Well-developed and well-nourished.  Head: Atraumatic. Normocephalic.  Eyes:  EOM intact.  No scleral icterus.  ENT: Mucous membranes are moist.  Nares clear   Neck:  Full ROM. No JVD.  Cardiovascular: Regular rate. Regular rhythm No murmurs, rubs, or gallops. Distal pulses are 2+ and symmetric  Pulmonary/Chest: No respiratory distress. Clear to auscultation bilaterally. No wheezing or rales.  Equal chest wall rise bilaterally  Abdominal: Soft and non-distended.  There is no tenderness.  No rebound, guarding, or rigidity. Good bowel sounds.  Genitourinary: No CVA tenderness.  No suprapubic tenderness  Musculoskeletal: Moves all extremities. No obvious deformities.  5 x 5 strength in all extremities   Skin: Warm and dry.  Midline chest wall incision.  Healing well.  Clean dry intact.  Neurological:  Alert, awake, and appropriate.  Normal speech.  No acute focal neurological deficits are appreciated.  Two through 12 intact bilaterally.  Psychiatric: Normal affect. Good eye  contact. Appropriate in content.    ED Course   Critical Care    Date/Time: 8/7/2022 9:48 AM  Performed by: Tomasz Bustamante Jr., MD  Authorized by: Tomasz Bustamante Jr., MD   Direct patient critical care time: 12 minutes  Additional history critical care time: 9 minutes  Ordering / reviewing critical care time: 7 minutes  Documentation critical care time: 8 minutes  Consulting other physicians critical care time: 2 minutes  Consult with family critical care time: 1 minutes  Total critical care time (exclusive of procedural time) : 39 minutes  Critical care time was exclusive of teaching time and separately billable procedures and treating other patients.  Critical care was necessary to treat or prevent imminent or life-threatening deterioration of the following conditions: elevated troponin, CP.  Critical care was time spent personally by me on the following activities: blood draw for specimens, development of treatment plan with patient or surrogate, discussions with consultants, interpretation of cardiac output measurements, evaluation of patient's response to treatment, examination of patient, obtaining history from patient or surrogate, ordering and performing treatments and interventions, ordering and review of laboratory studies, ordering and review of radiographic studies, pulse oximetry, re-evaluation of patient's condition and review of old charts.        Labs Reviewed   CBC W/ AUTO DIFFERENTIAL - Abnormal; Notable for the following components:       Result Value    RBC 2.91 (*)     Hemoglobin 8.8 (*)     Hematocrit 27.4 (*)     RDW 16.3 (*)     MPV 8.8 (*)     Lymph # 0.6 (*)     Gran % 84.7 (*)     Lymph % 8.3 (*)     All other components within normal limits   COMPREHENSIVE METABOLIC PANEL - Abnormal; Notable for the following components:    CO2 19 (*)     Glucose 149 (*)     BUN 27 (*)     Creatinine 4.2 (*)     Albumin 3.0 (*)     ALT 8 (*)     eGFR 10 (*)     All other components within normal limits    TROPONIN I - Abnormal; Notable for the following components:    Troponin I 0.235 (*)     All other components within normal limits   B-TYPE NATRIURETIC PEPTIDE - Abnormal; Notable for the following components:    BNP >4,900 (*)     All other components within normal limits   APTT    Narrative:     Draw baseline aPTT prior to starting the heparin bolus or  infusion  (if patient is on warfarin prior to heparin therapy)   PROTIME-INR    Narrative:     Draw baseline aPTT prior to starting the heparin bolus or  infusion  (if patient is on warfarin prior to heparin therapy)          Imaging Results          X-Ray Chest AP Portable (Final result)  Result time 08/07/22 09:28:15    Final result by Zi Gee MD (08/07/22 09:28:15)                 Impression:      Cardiomegaly with interstitial edema and small pleural fluid collections.      Electronically signed by: Zi Gee  Date:    08/07/2022  Time:    09:28             Narrative:    EXAMINATION:  XR CHEST AP PORTABLE    CLINICAL HISTORY:  Chest Pain;    COMPARISON:  08/03/2022    FINDINGS:  The heart is enlarged.  The aorta is atherosclerotic.  Bilateral pulmonary vascular congestion with mild interstitial edema.  Small pleural fluid collections.                                 Medications   heparin 25,000 units in dextrose 5% 250 mL (100 units/mL) infusion LOW INTENSITY nomogram - [facility] (12 Units/kg/hr × 58.4 kg (Adjusted) Intravenous Handoff 8/7/22 1224)   heparin 25,000 units in dextrose 5% (100 units/ml) IV bolus from bag - ADDITIONAL PRN BOLUS - 60 units/kg (max bolus 4000 units) (has no administration in time range)   heparin 25,000 units in dextrose 5% (100 units/ml) IV bolus from bag - ADDITIONAL PRN BOLUS - 30 units/kg (max bolus 4000 units) (has no administration in time range)   nitroGLYCERIN 2% TD oint ointment 1 inch (1 inch Topical (Top) Given 8/7/22 0907)   heparin 25,000 units in dextrose 5% (100 units/ml) IV bolus from bag INITIAL BOLUS (max bolus  4000 units) (3,500 Units Intravenous Bolus from Bag 8/7/22 1149)   morphine injection 4 mg (4 mg Intravenous Given 8/7/22 1011)   ondansetron injection 4 mg (4 mg Intravenous Given 8/7/22 1011)     Medical Decision Making:   Clinical Tests:   Lab Tests: Ordered and Reviewed  Radiological Study: Ordered and Reviewed                    9:50 AM: Discussed pt's case with Dr. Spicer (Cardiology) who recommends admit and trend troponin, heparin.    9:54 AM: Discussed case with Jovan Cruz NP (Hospital Medicine). Dr. Dos Santos agrees with current care and management of pt and accepts admission.   Admitting Service: Hospital Medicine  Admitting Physician: Dr. Rocha  Admit to: OBS    9:55 AM: Re-evaluated pt. I have discussed test results, shared treatment plan, and the need for admission with patient and family at bedside. Pt and family express understanding at this time and agree with all information. All questions answered. Pt and family have no further questions or concerns at this time. Pt is ready for admit.          Clinical Impression:   Final diagnoses:  [R07.9] Chest pain  [R77.8] Troponin level elevated (Primary)  [U07.1] COVID-19 virus infection          ED Disposition Condition    Observation               Tomasz Bustamante Jr., MD  08/07/22 6107

## 2022-08-07 NOTE — HPI
77 y/o female with a pmh of ESRD on HD, DM2, hyperlipidemia,CAD s/p CABG  , Tobacco abuse , HTN   and h/o CVA presented to the ER wth a C/O Cough for the last 7 days days  which has gotten worse . She was Dx with COVID-19 infection on 8/3/22 an d/c home from the ER . She is s/p MAB infusion on 8/4/22 . She is s/p CABG  on 8/20  and d/c home ( LOS 16 days , D/c on 8/28). She is complaining of dry cough  associated with SOB and chest tightness .  It is also associted with  fatigue and tiredness . She denies any fever , chills , GI or  sx . The chest pain pain is located on the mid sternum and is worsening  with cough .   ER COURSE: CXR show b/l pulmonary congestion , troponin 0.290 , BNP> 4900 . Cardiology consulted and Rec to start heaprin drip .  ER VS:   BP Pulse Resp Temp SpO2   08/07/22 0812 08/07/22 0812 08/07/22 0812 08/07/22 0859 08/07/22 0812   (!) 179/73 92 20 99.6 °F (37.6 °C) 98 %         Pt will be admitted to observation with a Dx of COVID-19 infection , elevated troponin and ESRD  CODE STATUS : FULL CODE

## 2022-08-07 NOTE — SUBJECTIVE & OBJECTIVE
Past Medical History:   Diagnosis Date    Acute hypoxemic respiratory failure 11/26/2018    Anemia     Arthritis     back, hand, knees    Back pain     Cataract     CKD stage G4/A3, GFR 15 - 29 and albumin creatinine ratio >300 mg/g     GFR 40% Jan 2014 and 33% ub 3/2014 (BRG)    Coronary artery disease involving native coronary artery of native heart 11/30/2018    Diabetic retinopathy     DM (diabetes mellitus) type II controlled, neurological manifestation     Exudative age-related macular degeneration of left eye with active choroidal neovascularization 2/5/2019    GERD (gastroesophageal reflux disease)     Glaucoma     Heart failure     Hyperlipidemia     Hypertension     Non-ST elevation MI (NSTEMI) 11/28/2018    NSTEMI (non-ST elevated myocardial infarction) 11/27/2018    Peripheral neuropathy     Polyneuropathy     Proteinuria     >6 mo     Seizures     BRG 1/2014 -dick CT NRI showed small vessel    Stroke 2012,2014    Tobacco dependence     Type 2 diabetes with peripheral circulatory disorder, controlled        Past Surgical History:   Procedure Laterality Date    BREAST LUMPECTOMY Left     benign, when patient was 15 years old    BREAST MASS EXCISION      ,benign, age 15.    CATHETERIZATION OF BOTH LEFT AND RIGHT HEART N/A 11/28/2018    Procedure: CATHETERIZATION, HEART, BOTH LEFT AND RIGHT;  Surgeon: Michelle Holman MD;  Location: [facility] CATH LAB;  Service: Cardiology;  Laterality: N/A;    CATHETERIZATION OF BOTH LEFT AND RIGHT HEART N/A 11/30/2018    Procedure: CATHETERIZATION, HEART, BOTH LEFT AND RIGHT;  Surgeon: Michelle Holman MD;  Location: [facility] CATH LAB;  Service: Cardiology;  Laterality: N/A;    CORONARY ARTERY BYPASS GRAFT (CABG) N/A 7/20/2022    Procedure: CORONARY ARTERY BYPASS GRAFT (CABG);  Surgeon: Sanju Locke MD;  Location: [facility] OR;  Service: Cardiothoracic;  Laterality: N/A;  2-VESSEL PUMP ASSIST WITH EPI-AORTIC ULTRASOUND    ENDOSCOPIC HARVEST OF VEIN Left 7/20/2022    Procedure:  SURGICAL PROCUREMENT, VEIN, ENDOSCOPIC;  Surgeon: Sanju Locke MD;  Location: [facility] OR;  Service: Cardiothoracic;  Laterality: Left;    HYSTERECTOMY  1982    INJECTION OF ANESTHETIC AGENT AROUND MULTIPLE INTERCOSTAL NERVES N/A 7/20/2022    Procedure: BLOCK, NERVE, INTERCOSTAL, 2 OR MORE;  Surgeon: Sanju Locke MD;  Location: [facility] OR;  Service: Cardiothoracic;  Laterality: N/A;  PARASTERNAL NERVE BLOCK    INSERTION OF SHUNT      LEFT HEART CATHETERIZATION Left 12/3/2018    Procedure: CATHETERIZATION, HEART, LEFT;  Surgeon: Michelle Holman MD;  Location: [facility] CATH LAB;  Service: Cardiology;  Laterality: Left;  LAD ATHERECTOMY STENT/ FEMORAL APPROACH    LEFT HEART CATHETERIZATION Left 7/13/2022    Procedure: CATHETERIZATION, HEART, LEFT;  Surgeon: Abhi Sloan MD;  Location: [facility] CATH LAB;  Service: Cardiology;  Laterality: Left;    OOPHORECTOMY      wrist cyst Right 1980s    dorsal wrist cyst       Review of patient's allergies indicates:   Allergen Reactions    Codeine Swelling    Darvon [propoxyphene] Swelling    Atorvastatin      Muscle twitching       Current Facility-Administered Medications on File Prior to Encounter   Medication    albuterol inhaler 2 puff    EPINEPHrine (EPIPEN) 0.3 mg/0.3 mL pen injection 0.3 mg     Current Outpatient Medications on File Prior to Encounter   Medication Sig    ACCU-CHEK ARDEN PLUS METER Misc USE TO TEST TWICE DAILY    aspirin (ECOTRIN) 81 MG EC tablet Take 1 tablet (81 mg total) by mouth once daily.    blood sugar diagnostic Strp To check BG 2 times daily, to use with insurance preferred meter    clopidogreL (PLAVIX) 75 mg tablet Take 1 tablet (75 mg total) by mouth once daily.    cyanocobalamin (VITAMIN B-12) 1000 MCG tablet Take 100 mcg by mouth once daily.    folic acid (FOLVITE) 1 MG tablet Take 1 mg by mouth once daily.    insulin NPH/Reg human (HUMULIN, 70/30,) 100 unit/mL (70-30) InPn pen To do 28 units subq in AM and 8 units subq in PM.  Dose to be done  within 15 min before or after meal. (Patient taking differently: To do 28 units subq in AM and 8 units subq in PM.  Dose to be done within 15 min before or after meal.)    lancets Misc To check BG 2 times daily, to use with insurance preferred meter    metoprolol tartrate (LOPRESSOR) 50 MG tablet Take 1 tablet (50 mg total) by mouth 2 (two) times daily.    semaglutide (OZEMPIC) 0.25 mg or 0.5 mg(2 mg/1.5 mL) pen injector Inject 0.25 mg into the skin every 7 days.    azelastine (ASTELIN) 137 mcg (0.1 %) nasal spray SMARTSI Spray(s) Both Nares Twice Daily PRN    sevelamer carbonate (RENVELA) 800 mg Tab TAKE 2 TABLETS BY MOUTH THREE TIMES DAILY    telmisartan (MICARDIS) 20 MG Tab Take 1 tablet (20 mg total) by mouth once daily.    traMADoL (ULTRAM) 50 mg tablet Take 1 tablet (50 mg total) by mouth every 8 (eight) hours as needed for Pain.    triamcinolone acetonide 0.1% (KENALOG) 0.1 % cream Apply topically 2 (two) times daily.    [DISCONTINUED] benzonatate (TESSALON) 200 MG capsule Take 200 mg by mouth 3 (three) times daily.    [DISCONTINUED] isosorbide-hydrALAZINE 20-37.5 mg (BIDIL) 20-37.5 mg Tab Take 1 tablet by mouth 3 (three) times daily.     Family History       Problem Relation (Age of Onset)    Cancer Maternal Grandmother    Diabetes Mother    Heart disease Mother    Hyperlipidemia Mother    Hypertension Mother    Muscular dystrophy Son          Tobacco Use    Smoking status: Former Smoker     Packs/day: 1.50     Years: 30.00     Pack years: 45.00     Types: Cigarettes     Quit date: 1990     Years since quittin.5    Smokeless tobacco: Former User   Substance and Sexual Activity    Alcohol use: No     Alcohol/week: 0.0 standard drinks    Drug use: No    Sexual activity: Not Currently     Review of Systems   Constitutional: Negative for chills, diaphoresis, night sweats, weight gain and weight loss.   HENT:  Negative for congestion, hoarse voice, sore throat and stridor.    Eyes:  Negative for  double vision and pain.   Cardiovascular:  Positive for chest pain. Negative for claudication, cyanosis, dyspnea on exertion, irregular heartbeat, leg swelling, near-syncope, orthopnea, palpitations, paroxysmal nocturnal dyspnea and syncope.   Respiratory:  Negative for cough, hemoptysis, shortness of breath, sleep disturbances due to breathing, snoring, sputum production and wheezing.    Endocrine: Negative for cold intolerance, heat intolerance and polydipsia.   Hematologic/Lymphatic: Negative for bleeding problem. Does not bruise/bleed easily.   Skin:  Negative for color change, dry skin and rash.   Musculoskeletal:  Negative for joint swelling and muscle cramps.   Gastrointestinal:  Negative for bloating, abdominal pain, constipation, diarrhea, dysphagia, melena, nausea and vomiting.   Genitourinary:  Negative for flank pain and urgency.   Neurological:  Negative for dizziness, focal weakness, headaches, light-headedness, loss of balance, seizures and weakness.   Psychiatric/Behavioral:  Negative for altered mental status and memory loss. The patient is not nervous/anxious.    Objective:     Vital Signs (Most Recent):  Temp: 99.6 °F (37.6 °C) (08/07/22 0859)  Pulse: 87 (08/07/22 0957)  Resp: 20 (08/07/22 1011)  BP: (!) 164/76 (08/07/22 0957)  SpO2: 100 % (08/07/22 0957)   Vital Signs (24h Range):  Temp:  [99.6 °F (37.6 °C)] 99.6 °F (37.6 °C)  Pulse:  [83-92] 87  Resp:  [20-38] 20  SpO2:  [98 %-100 %] 100 %  BP: (164-197)/(73-84) 164/76     Weight: 60.6 kg (133 lb 8 oz)  Body mass index is 22.22 kg/m².    SpO2: 100 %  O2 Device (Oxygen Therapy): nasal cannula    No intake or output data in the 24 hours ending 08/07/22 1051    Lines/Drains/Airways       Drain  Duration                  Hemodialysis AV Fistula 04/01/20 2203 Left upper arm 857 days              Peripheral Intravenous Line  Duration                  Peripheral IV - Single Lumen 08/07/22 0000 22 G Right Hand <1 day                    Physical  Exam  Eyes:      Pupils: Pupils are equal, round, and reactive to light.   Neck:      Trachea: No tracheal deviation.   Cardiovascular:      Rate and Rhythm: Normal rate and regular rhythm.      Pulses: Intact distal pulses.           Carotid pulses are 2+ on the right side and 2+ on the left side.       Radial pulses are 2+ on the right side and 2+ on the left side.        Femoral pulses are 2+ on the right side and 2+ on the left side.       Popliteal pulses are 2+ on the right side and 2+ on the left side.        Dorsalis pedis pulses are 2+ on the right side and 2+ on the left side.        Posterior tibial pulses are 2+ on the right side and 2+ on the left side.      Heart sounds: Normal heart sounds. No murmur heard.    No friction rub. No gallop.   Pulmonary:      Effort: Pulmonary effort is normal. No respiratory distress.      Breath sounds: Normal breath sounds. No stridor. No wheezing or rales.   Chest:      Chest wall: No tenderness.   Abdominal:      General: There is no distension.      Tenderness: There is no abdominal tenderness. There is no rebound.   Musculoskeletal:         General: No tenderness.      Cervical back: Normal range of motion.   Skin:     General: Skin is warm and dry.   Neurological:      Mental Status: She is alert and oriented to person, place, and time.       Significant Labs: CMP   Recent Labs   Lab 08/07/22  0851      K 4.5      CO2 19*   *   BUN 27*   CREATININE 4.2*   CALCIUM 9.1   PROT 6.7   ALBUMIN 3.0*   BILITOT 0.7   ALKPHOS 87   AST 26   ALT 8*   ANIONGAP 15   , CBC   Recent Labs   Lab 08/07/22  0851   WBC 7.60   HGB 8.8*   HCT 27.4*      , and Troponin   Recent Labs   Lab 08/07/22  0851   TROPONINI 0.235*       Significant Imaging: Echocardiogram: Transthoracic echo (TTE) complete (Cupid Only):   Results for orders placed or performed during the hospital encounter of 07/12/22   Echo   Result Value Ref Range    BSA 1.68 m2    IVC diameter 1.93 cm     Left Ventricular Outflow Tract Mean Velocity 0.339213289466663 cm/s    Left Ventricular Outflow Tract Mean Gradient 1.94 mmHg    LVIDd 4.43 3.5 - 6.0 cm    IVS 1.15 (A) 0.6 - 1.1 cm    Posterior Wall 0.96 0.6 - 1.1 cm    Ao root annulus 2.78 cm    LVIDs 3.44 2.1 - 4.0 cm    FS 22 28 - 44 %    STJ 2.42 cm    Ascending aorta 2.32 cm    LV mass 161.00 g    LA size 3.85 cm    Left Ventricle Relative Wall Thickness 0.43 cm    AV mean gradient 4 mmHg    AV valve area 1.56 cm2    AV Velocity Ratio 0.79     AV index (prosthetic) 0.78     MV valve area p 1/2 method 6.15 cm2    E/A ratio 1.31     E wave deceleration time [id number] msec    IVRT [id number] msec    LVOT diameter 1.60 cm    LVOT area 2.0 cm2    LVOT peak lio 1.00 m/s    LVOT peak VTI 19.70 cm    Ao peak lio 1.26 m/s    Ao VTI 25.4 cm    RVOT peak lio 0.62 m/s    RVOT peak VTI 13.3 cm    Mr max lio 6.14 m/s    LVOT stroke volume 39.59 cm3    AV peak gradient 6 mmHg    PV mean gradient 0.85 mmHg    MV Peak E Lio 1.11 m/s    TR Max Lio 4.26 m/s    MV stenosis pressure 1/2 time [id number] ms    MV Peak A Lio 0.85 m/s    LV Systolic Volume 48.80 mL    LV Systolic Volume Index 29.0 mL/m2    LV Diastolic Volume 88.87 mL    LV Diastolic Volume Index 52.90 mL/m2    LV Mass Index 96 g/m2    RA Major Axis 3.88 cm    Left Atrium Minor Axis 3.59 cm    Left Atrium Major Axis 4.08 cm    Triscuspid Valve Regurgitation Peak Gradient 73 mmHg    EF 55 %    Narrative    · Concentric hypertrophy and normal systolic function.  · The estimated ejection fraction is 55%.  · Normal left ventricular diastolic function.  · Normal right ventricular size with normal right ventricular systolic   function.  · Moderate tricuspid regurgitation.  · There is pulmonary hypertension.

## 2022-08-07 NOTE — PLAN OF CARE
POC reviewed with pt. Pt verbalizes understanding of POC. No questions at this time.  AAOx4. NADN.  NSR on cardiac monitor.  Pt remains free of falls  Heparin infusing   No complaints at this time.  Safety measures in place. Will continue to monitor.  Informed pt to call for assistance before getting up. Pt verbalizes understanding.

## 2022-08-07 NOTE — SUBJECTIVE & OBJECTIVE
Past Medical History:   Diagnosis Date    Acute hypoxemic respiratory failure 11/26/2018    Anemia     Arthritis     back, hand, knees    Back pain     Cataract     CKD stage G4/A3, GFR 15 - 29 and albumin creatinine ratio >300 mg/g     GFR 40% Jan 2014 and 33% ub 3/2014 ([facility])    Coronary artery disease involving native coronary artery of native heart 11/30/2018    Diabetic retinopathy     DM (diabetes mellitus) type II controlled, neurological manifestation     Exudative age-related macular degeneration of left eye with active choroidal neovascularization 2/5/2019    GERD (gastroesophageal reflux disease)     Glaucoma     Heart failure     Hyperlipidemia     Hypertension     Non-ST elevation MI (NSTEMI) 11/28/2018    NSTEMI (non-ST elevated myocardial infarction) 11/27/2018    Peripheral neuropathy     Polyneuropathy     Proteinuria     >6 mo     Seizures     BRG 1/2014 -dick CT NRI showed small vessel    Stroke 2012,2014    Tobacco dependence     Type 2 diabetes with peripheral circulatory disorder, controlled        Past Surgical History:   Procedure Laterality Date    BREAST LUMPECTOMY Left     benign, when patient was 15 years old    BREAST MASS EXCISION      ,benign, age 15.    CATHETERIZATION OF BOTH LEFT AND RIGHT HEART N/A 11/28/2018    Procedure: CATHETERIZATION, HEART, BOTH LEFT AND RIGHT;  Surgeon: Michelle Holman MD;  Location: [facility] CATH LAB;  Service: Cardiology;  Laterality: N/A;    CATHETERIZATION OF BOTH LEFT AND RIGHT HEART N/A 11/30/2018    Procedure: CATHETERIZATION, HEART, BOTH LEFT AND RIGHT;  Surgeon: Michelle Holman MD;  Location: [facility] CATH LAB;  Service: Cardiology;  Laterality: N/A;    CORONARY ARTERY BYPASS GRAFT (CABG) N/A 7/20/2022    Procedure: CORONARY ARTERY BYPASS GRAFT (CABG);  Surgeon: Sanju Locke MD;  Location: [facility] OR;  Service: Cardiothoracic;  Laterality: N/A;  2-VESSEL PUMP ASSIST WITH EPI-AORTIC ULTRASOUND    ENDOSCOPIC HARVEST OF VEIN Left 7/20/2022    Procedure:  SURGICAL PROCUREMENT, VEIN, ENDOSCOPIC;  Surgeon: Sanju Locke MD;  Location: [facility] OR;  Service: Cardiothoracic;  Laterality: Left;    HYSTERECTOMY  1982    INJECTION OF ANESTHETIC AGENT AROUND MULTIPLE INTERCOSTAL NERVES N/A 7/20/2022    Procedure: BLOCK, NERVE, INTERCOSTAL, 2 OR MORE;  Surgeon: Sanju Locke MD;  Location: [facility] OR;  Service: Cardiothoracic;  Laterality: N/A;  PARASTERNAL NERVE BLOCK    INSERTION OF SHUNT      LEFT HEART CATHETERIZATION Left 12/3/2018    Procedure: CATHETERIZATION, HEART, LEFT;  Surgeon: Michelle Holman MD;  Location: [facility] CATH LAB;  Service: Cardiology;  Laterality: Left;  LAD ATHERECTOMY STENT/ FEMORAL APPROACH    LEFT HEART CATHETERIZATION Left 7/13/2022    Procedure: CATHETERIZATION, HEART, LEFT;  Surgeon: Abhi Sloan MD;  Location: [facility] CATH LAB;  Service: Cardiology;  Laterality: Left;    OOPHORECTOMY      wrist cyst Right 1980s    dorsal wrist cyst       Review of patient's allergies indicates:   Allergen Reactions    Codeine Swelling    Darvon [propoxyphene] Swelling    Atorvastatin      Muscle twitching       No current facility-administered medications on file prior to encounter.     Current Outpatient Medications on File Prior to Encounter   Medication Sig    ACCU-CHEK ARDEN PLUS METER Misc USE TO TEST TWICE DAILY    aspirin (ECOTRIN) 81 MG EC tablet Take 1 tablet (81 mg total) by mouth once daily.    blood sugar diagnostic Strp To check BG 2 times daily, to use with insurance preferred meter    clopidogreL (PLAVIX) 75 mg tablet Take 1 tablet (75 mg total) by mouth once daily.    cyanocobalamin (VITAMIN B-12) 1000 MCG tablet Take 100 mcg by mouth once daily.    folic acid (FOLVITE) 1 MG tablet Take 1 mg by mouth once daily.    insulin NPH/Reg human (HUMULIN, 70/30,) 100 unit/mL (70-30) InPn pen To do 28 units subq in AM and 8 units subq in PM.  Dose to be done within 15 min before or after meal. (Patient taking differently: To do 28 units subq in AM and  8 units subq in PM.  Dose to be done within 15 min before or after meal.)    lancets Misc To check BG 2 times daily, to use with insurance preferred meter    metoprolol tartrate (LOPRESSOR) 50 MG tablet Take 1 tablet (50 mg total) by mouth 2 (two) times daily.    semaglutide (OZEMPIC) 0.25 mg or 0.5 mg(2 mg/1.5 mL) pen injector Inject 0.25 mg into the skin every 7 days.    azelastine (ASTELIN) 137 mcg (0.1 %) nasal spray SMARTSI Spray(s) Both Nares Twice Daily PRN    sevelamer carbonate (RENVELA) 800 mg Tab TAKE 2 TABLETS BY MOUTH THREE TIMES DAILY    telmisartan (MICARDIS) 20 MG Tab Take 1 tablet (20 mg total) by mouth once daily.    traMADoL (ULTRAM) 50 mg tablet Take 1 tablet (50 mg total) by mouth every 8 (eight) hours as needed for Pain.    triamcinolone acetonide 0.1% (KENALOG) 0.1 % cream Apply topically 2 (two) times daily.    [DISCONTINUED] benzonatate (TESSALON) 200 MG capsule Take 200 mg by mouth 3 (three) times daily.    [DISCONTINUED] isosorbide-hydrALAZINE 20-37.5 mg (BIDIL) 20-37.5 mg Tab Take 1 tablet by mouth 3 (three) times daily.     Family History       Problem Relation (Age of Onset)    Cancer Maternal Grandmother    Diabetes Mother    Heart disease Mother    Hyperlipidemia Mother    Hypertension Mother    Muscular dystrophy Son          Tobacco Use    Smoking status: Former Smoker     Packs/day: 1.50     Years: 30.00     Pack years: 45.00     Types: Cigarettes     Quit date: 1990     Years since quittin.5    Smokeless tobacco: Former User   Substance and Sexual Activity    Alcohol use: No     Alcohol/week: 0.0 standard drinks    Drug use: No    Sexual activity: Not Currently     Review of Systems   Constitutional:  Positive for activity change and fatigue.   HENT:  Positive for congestion.    Eyes: Negative.    Respiratory:  Positive for cough, chest tightness and shortness of breath.    Gastrointestinal: Negative.    Endocrine: Negative.    Genitourinary: Negative.     Musculoskeletal: Negative.    Skin: Negative.    Allergic/Immunologic: Negative.    Neurological:  Positive for weakness.   Hematological: Negative.    Psychiatric/Behavioral: Negative.     Objective:     Vital Signs (Most Recent):  Temp: 99 °F (37.2 °C) (08/07/22 1237)  Pulse: 72 (08/07/22 1500)  Resp: 20 (08/07/22 1237)  BP: (!) 93/44 (08/07/22 1237)  SpO2: 100 % (08/07/22 1500)   Vital Signs (24h Range):  Temp:  [99 °F (37.2 °C)-99.6 °F (37.6 °C)] 99 °F (37.2 °C)  Pulse:  [72-92] 72  Resp:  [20-38] 20  SpO2:  [97 %-100 %] 100 %  BP: ()/(44-84) 93/44     Weight: 60.6 kg (133 lb 8 oz)  Body mass index is 22.22 kg/m².    Physical Exam  Vitals and nursing note reviewed.   Constitutional:       Appearance: Normal appearance. She is ill-appearing.   HENT:      Head: Normocephalic and atraumatic.      Nose: Nose normal.      Mouth/Throat:      Mouth: Mucous membranes are moist.   Eyes:      Extraocular Movements: Extraocular movements intact.      Conjunctiva/sclera: Conjunctivae normal.      Pupils: Pupils are equal, round, and reactive to light.   Cardiovascular:      Rate and Rhythm: Normal rate and regular rhythm.      Pulses: Normal pulses.      Heart sounds: Normal heart sounds. No murmur heard.    No friction rub.   Pulmonary:      Effort: Pulmonary effort is normal. No respiratory distress.      Breath sounds: Rhonchi present.   Abdominal:      General: Abdomen is flat. Bowel sounds are normal.      Palpations: Abdomen is soft.   Musculoskeletal:         General: No swelling or tenderness. Normal range of motion.      Cervical back: Normal range of motion. No rigidity or tenderness.   Skin:     General: Skin is warm.      Coloration: Skin is not jaundiced.   Neurological:      General: No focal deficit present.      Mental Status: She is alert and oriented to person, place, and time. Mental status is at baseline.      Cranial Nerves: No cranial nerve deficit.      Sensory: No sensory deficit.      Motor:  No weakness.   Psychiatric:         Mood and Affect: Mood normal.         CRANIAL NERVES     CN III, IV, VI   Pupils are equal, round, and reactive to light.     Significant Labs: All pertinent labs within the past 24 hours have been reviewed.      Significant Imaging: I have reviewed all pertinent imaging results/findings within the past 24 hours.

## 2022-08-07 NOTE — CONSULTS
[facility] Emergency Dept.  Cardiology  Consult Note    Patient Name: Avril Monzon  MRN: [id number]  Admission Date: 8/7/2022  Hospital Length of Stay: 0 days  Code Status: Prior   Attending Provider: Suhas Prakash, *   Consulting Provider: Jeremie Spicer MD  Primary Care Physician: Rose Parks MD  Principal Problem:<principal problem not specified>    Patient information was obtained from patient and ER records.     Inpatient consult to Cardiology  Consult performed by: Jeremie Spicer MD  Consult ordered by: Tomasz Bustamante Jr., MD        Subjective:     Chief Complaint:  Chest pain     HPI:   87-year-old  female 3 weeks post CABG with a positive COVID test on 08/03 presenting with chest tightness overnight.  She has cough with some intermittent shortness of breath.  She denies any nausea vomiting or diaphoresis.  She denies any chest pain simply tightness.  Denies any fevers or chills.  Patient denies any nausea vomiting.  No diaphoresis.  Patient seen and examined in no distress. Plan trend troponin and continue heparin.       Past Medical History:   Diagnosis Date    Acute hypoxemic respiratory failure 11/26/2018    Anemia     Arthritis     back, hand, knees    Back pain     Cataract     CKD stage G4/A3, GFR 15 - 29 and albumin creatinine ratio >300 mg/g     GFR 40% Jan 2014 and 33% ub 3/2014 ([facility])    Coronary artery disease involving native coronary artery of native heart 11/30/2018    Diabetic retinopathy     DM (diabetes mellitus) type II controlled, neurological manifestation     Exudative age-related macular degeneration of left eye with active choroidal neovascularization 2/5/2019    GERD (gastroesophageal reflux disease)     Glaucoma     Heart failure     Hyperlipidemia     Hypertension     Non-ST elevation MI (NSTEMI) 11/28/2018    NSTEMI (non-ST elevated myocardial infarction) 11/27/2018    Peripheral neuropathy     Polyneuropathy     Proteinuria      >6 mo     Seizures     [facility] 1/2014 -dick CT NRI showed small vessel    Stroke 2012,2014    Tobacco dependence     Type 2 diabetes with peripheral circulatory disorder, controlled        Past Surgical History:   Procedure Laterality Date    BREAST LUMPECTOMY Left     benign, when patient was 15 years old    BREAST MASS EXCISION      ,benign, age 15.    CATHETERIZATION OF BOTH LEFT AND RIGHT HEART N/A 11/28/2018    Procedure: CATHETERIZATION, HEART, BOTH LEFT AND RIGHT;  Surgeon: Michelle Holman MD;  Location: [facility] CATH LAB;  Service: Cardiology;  Laterality: N/A;    CATHETERIZATION OF BOTH LEFT AND RIGHT HEART N/A 11/30/2018    Procedure: CATHETERIZATION, HEART, BOTH LEFT AND RIGHT;  Surgeon: Michelle Holman MD;  Location: [facility] CATH LAB;  Service: Cardiology;  Laterality: N/A;    CORONARY ARTERY BYPASS GRAFT (CABG) N/A 7/20/2022    Procedure: CORONARY ARTERY BYPASS GRAFT (CABG);  Surgeon: Sanju Locke MD;  Location: [facility] OR;  Service: Cardiothoracic;  Laterality: N/A;  2-VESSEL PUMP ASSIST WITH EPI-AORTIC ULTRASOUND    ENDOSCOPIC HARVEST OF VEIN Left 7/20/2022    Procedure: SURGICAL PROCUREMENT, VEIN, ENDOSCOPIC;  Surgeon: Sanju Locke MD;  Location: [facility] OR;  Service: Cardiothoracic;  Laterality: Left;    HYSTERECTOMY  1982    INJECTION OF ANESTHETIC AGENT AROUND MULTIPLE INTERCOSTAL NERVES N/A 7/20/2022    Procedure: BLOCK, NERVE, INTERCOSTAL, 2 OR MORE;  Surgeon: Sanju Locke MD;  Location: [facility] OR;  Service: Cardiothoracic;  Laterality: N/A;  PARASTERNAL NERVE BLOCK    INSERTION OF SHUNT      LEFT HEART CATHETERIZATION Left 12/3/2018    Procedure: CATHETERIZATION, HEART, LEFT;  Surgeon: Michelle Holman MD;  Location: [facility] CATH LAB;  Service: Cardiology;  Laterality: Left;  LAD ATHERECTOMY STENT/ FEMORAL APPROACH    LEFT HEART CATHETERIZATION Left 7/13/2022    Procedure: CATHETERIZATION, HEART, LEFT;  Surgeon: Abhi Sloan MD;  Location: [facility] CATH LAB;  Service: Cardiology;   Laterality: Left;    OOPHORECTOMY      wrist cyst Right 1980s    dorsal wrist cyst       Review of patient's allergies indicates:   Allergen Reactions    Codeine Swelling    Darvon [propoxyphene] Swelling    Atorvastatin      Muscle twitching       Current Facility-Administered Medications on File Prior to Encounter   Medication    albuterol inhaler 2 puff    EPINEPHrine (EPIPEN) 0.3 mg/0.3 mL pen injection 0.3 mg     Current Outpatient Medications on File Prior to Encounter   Medication Sig    ACCU-CHEK ARDEN PLUS METER Misc USE TO TEST TWICE DAILY    aspirin (ECOTRIN) 81 MG EC tablet Take 1 tablet (81 mg total) by mouth once daily.    blood sugar diagnostic Strp To check BG 2 times daily, to use with insurance preferred meter    clopidogreL (PLAVIX) 75 mg tablet Take 1 tablet (75 mg total) by mouth once daily.    cyanocobalamin (VITAMIN B-12) 1000 MCG tablet Take 100 mcg by mouth once daily.    folic acid (FOLVITE) 1 MG tablet Take 1 mg by mouth once daily.    insulin NPH/Reg human (HUMULIN, 70/30,) 100 unit/mL (70-30) InPn pen To do 28 units subq in AM and 8 units subq in PM.  Dose to be done within 15 min before or after meal. (Patient taking differently: To do 28 units subq in AM and 8 units subq in PM.  Dose to be done within 15 min before or after meal.)    lancets Misc To check BG 2 times daily, to use with insurance preferred meter    metoprolol tartrate (LOPRESSOR) 50 MG tablet Take 1 tablet (50 mg total) by mouth 2 (two) times daily.    semaglutide (OZEMPIC) 0.25 mg or 0.5 mg(2 mg/1.5 mL) pen injector Inject 0.25 mg into the skin every 7 days.    azelastine (ASTELIN) 137 mcg (0.1 %) nasal spray SMARTSI Spray(s) Both Nares Twice Daily PRN    sevelamer carbonate (RENVELA) 800 mg Tab TAKE 2 TABLETS BY MOUTH THREE TIMES DAILY    telmisartan (MICARDIS) 20 MG Tab Take 1 tablet (20 mg total) by mouth once daily.    traMADoL (ULTRAM) 50 mg tablet Take 1 tablet (50 mg total) by mouth every  8 (eight) hours as needed for Pain.    triamcinolone acetonide 0.1% (KENALOG) 0.1 % cream Apply topically 2 (two) times daily.    [DISCONTINUED] benzonatate (TESSALON) 200 MG capsule Take 200 mg by mouth 3 (three) times daily.    [DISCONTINUED] isosorbide-hydrALAZINE 20-37.5 mg (BIDIL) 20-37.5 mg Tab Take 1 tablet by mouth 3 (three) times daily.     Family History       Problem Relation (Age of Onset)    Cancer Maternal Grandmother    Diabetes Mother    Heart disease Mother    Hyperlipidemia Mother    Hypertension Mother    Muscular dystrophy Son          Tobacco Use    Smoking status: Former Smoker     Packs/day: 1.50     Years: 30.00     Pack years: 45.00     Types: Cigarettes     Quit date: 1990     Years since quittin.5    Smokeless tobacco: Former User   Substance and Sexual Activity    Alcohol use: No     Alcohol/week: 0.0 standard drinks    Drug use: No    Sexual activity: Not Currently     Review of Systems   Constitutional: Negative for chills, diaphoresis, night sweats, weight gain and weight loss.   HENT:  Negative for congestion, hoarse voice, sore throat and stridor.    Eyes:  Negative for double vision and pain.   Cardiovascular:  Positive for chest pain. Negative for claudication, cyanosis, dyspnea on exertion, irregular heartbeat, leg swelling, near-syncope, orthopnea, palpitations, paroxysmal nocturnal dyspnea and syncope.   Respiratory:  Negative for cough, hemoptysis, shortness of breath, sleep disturbances due to breathing, snoring, sputum production and wheezing.    Endocrine: Negative for cold intolerance, heat intolerance and polydipsia.   Hematologic/Lymphatic: Negative for bleeding problem. Does not bruise/bleed easily.   Skin:  Negative for color change, dry skin and rash.   Musculoskeletal:  Negative for joint swelling and muscle cramps.   Gastrointestinal:  Negative for bloating, abdominal pain, constipation, diarrhea, dysphagia, melena, nausea and vomiting.    Genitourinary:  Negative for flank pain and urgency.   Neurological:  Negative for dizziness, focal weakness, headaches, light-headedness, loss of balance, seizures and weakness.   Psychiatric/Behavioral:  Negative for altered mental status and memory loss. The patient is not nervous/anxious.    Objective:     Vital Signs (Most Recent):  Temp: 99.6 °F (37.6 °C) (08/07/22 0859)  Pulse: 87 (08/07/22 0957)  Resp: 20 (08/07/22 1011)  BP: (!) 164/76 (08/07/22 0957)  SpO2: 100 % (08/07/22 0957)   Vital Signs (24h Range):  Temp:  [99.6 °F (37.6 °C)] 99.6 °F (37.6 °C)  Pulse:  [83-92] 87  Resp:  [20-38] 20  SpO2:  [98 %-100 %] 100 %  BP: (164-197)/(73-84) 164/76     Weight: 60.6 kg (133 lb 8 oz)  Body mass index is 22.22 kg/m².    SpO2: 100 %  O2 Device (Oxygen Therapy): nasal cannula    No intake or output data in the 24 hours ending 08/07/22 1051    Lines/Drains/Airways       Drain  Duration                  Hemodialysis AV Fistula 04/01/20 2203 Left upper arm 857 days              Peripheral Intravenous Line  Duration                  Peripheral IV - Single Lumen 08/07/22 0000 22 G Right Hand <1 day                    Physical Exam  Eyes:      Pupils: Pupils are equal, round, and reactive to light.   Neck:      Trachea: No tracheal deviation.   Cardiovascular:      Rate and Rhythm: Normal rate and regular rhythm.      Pulses: Intact distal pulses.           Carotid pulses are 2+ on the right side and 2+ on the left side.       Radial pulses are 2+ on the right side and 2+ on the left side.        Femoral pulses are 2+ on the right side and 2+ on the left side.       Popliteal pulses are 2+ on the right side and 2+ on the left side.        Dorsalis pedis pulses are 2+ on the right side and 2+ on the left side.        Posterior tibial pulses are 2+ on the right side and 2+ on the left side.      Heart sounds: Normal heart sounds. No murmur heard.    No friction rub. No gallop.   Pulmonary:      Effort: Pulmonary effort  is normal. No respiratory distress.      Breath sounds: Normal breath sounds. No stridor. No wheezing or rales.   Chest:      Chest wall: No tenderness.   Abdominal:      General: There is no distension.      Tenderness: There is no abdominal tenderness. There is no rebound.   Musculoskeletal:         General: No tenderness.      Cervical back: Normal range of motion.   Skin:     General: Skin is warm and dry.   Neurological:      Mental Status: She is alert and oriented to person, place, and time.       Significant Labs: CMP   Recent Labs   Lab 08/07/22  0851      K 4.5      CO2 19*   *   BUN 27*   CREATININE 4.2*   CALCIUM 9.1   PROT 6.7   ALBUMIN 3.0*   BILITOT 0.7   ALKPHOS 87   AST 26   ALT 8*   ANIONGAP 15   , CBC   Recent Labs   Lab 08/07/22  0851   WBC 7.60   HGB 8.8*   HCT 27.4*      , and Troponin   Recent Labs   Lab 08/07/22  0851   TROPONINI 0.235*       Significant Imaging: Echocardiogram: Transthoracic echo (TTE) complete ([facility]upid Only):   Results for orders placed or performed during the hospital encounter of 07/12/22   Echo   Result Value Ref Range    BSA 1.68 m2    IVC diameter 1.93 cm    Left Ventricular Outflow Tract Mean Velocity 0.112565007011866 cm/s    Left Ventricular Outflow Tract Mean Gradient 1.94 mmHg    LVIDd 4.43 3.5 - 6.0 cm    IVS 1.15 (A) 0.6 - 1.1 cm    Posterior Wall 0.96 0.6 - 1.1 cm    Ao root annulus 2.78 cm    LVIDs 3.44 2.1 - 4.0 cm    FS 22 28 - 44 %    STJ 2.42 cm    Ascending aorta 2.32 cm    LV mass 161.00 g    LA size 3.85 cm    Left Ventricle Relative Wall Thickness 0.43 cm    AV mean gradient 4 mmHg    AV valve area 1.56 cm2    AV Velocity Ratio 0.79     AV index (prosthetic) 0.78     MV valve area p 1/2 method 6.15 cm2    E/A ratio 1.31     E wave deceleration time 123.844835157120396 msec    IVRT 39.782250274511750 msec    LVOT diameter 1.60 cm    LVOT area 2.0 cm2    LVOT peak jigar 1.00 m/s    LVOT peak VTI 19.70 cm    Ao peak jigar 1.26 m/s     Ao VTI 25.4 cm    RVOT peak lio 0.62 m/s    RVOT peak VTI 13.3 cm    Mr max lio 6.14 m/s    LVOT stroke volume 39.59 cm3    AV peak gradient 6 mmHg    PV mean gradient 0.85 mmHg    MV Peak E Lio 1.11 m/s    TR Max Lio 4.26 m/s    MV stenosis pressure 1/2 time 35.154426193755782 ms    MV Peak A Lio 0.85 m/s    LV Systolic Volume 48.80 mL    LV Systolic Volume Index 29.0 mL/m2    LV Diastolic Volume 88.87 mL    LV Diastolic Volume Index 52.90 mL/m2    LV Mass Index 96 g/m2    RA Major Axis 3.88 cm    Left Atrium Minor Axis 3.59 cm    Left Atrium Major Axis 4.08 cm    Triscuspid Valve Regurgitation Peak Gradient 73 mmHg    EF 55 %    Narrative    · Concentric hypertrophy and normal systolic function.  · The estimated ejection fraction is 55%.  · Normal left ventricular diastolic function.  · Normal right ventricular size with normal right ventricular systolic   function.  · Moderate tricuspid regurgitation.  · There is pulmonary hypertension.        Assessment and Plan:     CAD (coronary artery disease)  Recent CABG and stable, would healing  Plan heparin for CP    Anemia in chronic kidney disease, on chronic dialysis  Per hospital medicine        VTE Risk Mitigation (From admission, onward)         Ordered     heparin 25,000 units in dextrose 5% (100 units/ml) IV bolus from bag - ADDITIONAL PRN BOLUS - 60 units/kg (max bolus 4000 units)  As needed (PRN)        Question:  Heparin Infusion Adjustment (DO NOT MODIFY ANSWER)  Answer:  \[email or web address]\Images\Pharmacy\HeparinInfusions\heparin LOW INTENSITY nomogram for [facility] [id number]    08/07/22 0951     heparin 25,000 units in dextrose 5% (100 units/ml) IV bolus from bag - ADDITIONAL PRN BOLUS - 30 units/kg (max bolus 4000 units)  As needed (PRN)        Question:  Heparin Infusion Adjustment (DO NOT MODIFY ANSWER)  Answer:  [email or web address]\Images\Pharmacy\HeparinInfusions\heparin LOW INTENSITY nomogram for [facility] [id number]    08/07/22 0951     heparin 25,000 units in  dextrose 5% (100 units/ml) IV bolus from bag INITIAL BOLUS (max bolus 4000 units)  Once        Question:  Heparin Infusion Adjustment (DO NOT MODIFY ANSWER)  Answer:  [email or web address]\epic\Images\Pharmacy\HeparinInfusions\heparin LOW INTENSITY nomogram for [facility] [id number]    08/07/22 0951     heparin 25,000 units in dextrose 5% 250 mL (100 units/mL) infusion LOW INTENSITY nomogram - [facility]  Continuous        Question Answer Comment   Heparin Infusion Adjustment (DO NOT MODIFY ANSWER) [email or web address]\epic\Images\Pharmacy\HeparinInfusions\heparin LOW INTENSITY nomogram for [facility] [id number]    Begin at (in units/kg/hr) 12        08/07/22 0951                Thank you for your consult. I will follow-up with patient. Please contact us if you have any additional questions.    Jeremie Spicer MD  Cardiology   [facility] - Emergency Dept.

## 2022-08-07 NOTE — H&P
[facility]  History & Physical    Patient Name: Avril Monzon  MRN: [id number]  Patient Class: OP- Observation  Admission Date: 8/7/2022  Attending Physician: Suhas Prakash MD  Primary Care Provider: Rose Parks MD         Patient information was obtained from patient and ER records.     Subjective:     Principal Problem:COVID    Chief Complaint:   Chief Complaint   Patient presents with    Chest Pain     Chest pain, bypass 3 weeks ago.  COVID +        HPI:   77 y/o female with a pmh of ESRD on HD, DM2, hyperlipidemia,CAD s/p CABG  , Tobacco abuse , HTN   and h/o CVA presented to the ER wth a C/O Cough for the last 7 days days  which has gotten worse . She was Dx with COVID-19 infection on 8/3/22 an d/c home from the ER . She is s/p MAB infusion on 8/4/22 . She is s/p CABG  on 8/20  and d/c home ( LOS 16 days , D/c on 8/28). She is complaining of dry cough  associated with SOB and chest tightness .  It is also associted with  fatigue and tiredness . She denies any fever , chills , GI or  sx . The chest pain pain is located on the mid sternum and is worsening  with cough .   ER COURSE: CXR show b/l pulmonary congestion , troponin 0.290 , BNP> 4900 . Cardiology consulted and Rec to start heaprin drip .  ER VS:   BP Pulse Resp Temp SpO2   08/07/22 0812 08/07/22 0812 08/07/22 0812 08/07/22 0859 08/07/22 0812   (!) 179/73 92 20 99.6 °F (37.6 °C) 98 %         Pt will be admitted to observation with a Dx of COVID-19 infection , elevated troponin and ESRD  CODE STATUS : FULL CODE      Past Medical History:   Diagnosis Date    Acute hypoxemic respiratory failure 11/26/2018    Anemia     Arthritis     back, hand, knees    Back pain     Cataract     CKD stage G4/A3, GFR 15 - 29 and albumin creatinine ratio >300 mg/g     GFR 40% Jan 2014 and 33% ub 3/2014 ([facility])    Coronary artery disease involving native coronary artery of native heart 11/30/2018    Diabetic  retinopathy     DM (diabetes mellitus) type II controlled, neurological manifestation     Exudative age-related macular degeneration of left eye with active choroidal neovascularization 2/5/2019    GERD (gastroesophageal reflux disease)     Glaucoma     Heart failure     Hyperlipidemia     Hypertension     Non-ST elevation MI (NSTEMI) 11/28/2018    NSTEMI (non-ST elevated myocardial infarction) 11/27/2018    Peripheral neuropathy     Polyneuropathy     Proteinuria     >6 mo     Seizures     [facility] 1/2014 -dick CT NRI showed small vessel    Stroke 2012,2014    Tobacco dependence     Type 2 diabetes with peripheral circulatory disorder, controlled        Past Surgical History:   Procedure Laterality Date    BREAST LUMPECTOMY Left     benign, when patient was 15 years old    BREAST MASS EXCISION      ,benign, age 15.    CATHETERIZATION OF BOTH LEFT AND RIGHT HEART N/A 11/28/2018    Procedure: CATHETERIZATION, HEART, BOTH LEFT AND RIGHT;  Surgeon: Michelle Holman MD;  Location: [facility] CATH LAB;  Service: Cardiology;  Laterality: N/A;    CATHETERIZATION OF BOTH LEFT AND RIGHT HEART N/A 11/30/2018    Procedure: CATHETERIZATION, HEART, BOTH LEFT AND RIGHT;  Surgeon: Michelle Holman MD;  Location: [facility] CATH LAB;  Service: Cardiology;  Laterality: N/A;    CORONARY ARTERY BYPASS GRAFT (CABG) N/A 7/20/2022    Procedure: CORONARY ARTERY BYPASS GRAFT (CABG);  Surgeon: Sanju Locke MD;  Location: [facility] OR;  Service: Cardiothoracic;  Laterality: N/A;  2-VESSEL PUMP ASSIST WITH EPI-AORTIC ULTRASOUND    ENDOSCOPIC HARVEST OF VEIN Left 7/20/2022    Procedure: SURGICAL PROCUREMENT, VEIN, ENDOSCOPIC;  Surgeon: Sanju Locke MD;  Location: [facility] OR;  Service: Cardiothoracic;  Laterality: Left;    HYSTERECTOMY  1982    INJECTION OF ANESTHETIC AGENT AROUND MULTIPLE INTERCOSTAL NERVES N/A 7/20/2022    Procedure: BLOCK, NERVE, INTERCOSTAL, 2 OR MORE;  Surgeon: Sanju Locke MD;  Location: [facility] OR;  Service:  Cardiothoracic;  Laterality: N/A;  PARASTERNAL NERVE BLOCK    INSERTION OF SHUNT      LEFT HEART CATHETERIZATION Left 12/3/2018    Procedure: CATHETERIZATION, HEART, LEFT;  Surgeon: Michelle Holman MD;  Location: [facility] CATH LAB;  Service: Cardiology;  Laterality: Left;  LAD ATHERECTOMY STENT/ FEMORAL APPROACH    LEFT HEART CATHETERIZATION Left 7/13/2022    Procedure: CATHETERIZATION, HEART, LEFT;  Surgeon: Abhi Sloan MD;  Location: [facility] CATH LAB;  Service: Cardiology;  Laterality: Left;    OOPHORECTOMY      wrist cyst Right 1980s    dorsal wrist cyst       Review of patient's allergies indicates:   Allergen Reactions    Codeine Swelling    Darvon [propoxyphene] Swelling    Atorvastatin      Muscle twitching       No current facility-administered medications on file prior to encounter.     Current Outpatient Medications on File Prior to Encounter   Medication Sig    ACCU-CHEK ARDEN PLUS METER Misc USE TO TEST TWICE DAILY    aspirin (ECOTRIN) 81 MG EC tablet Take 1 tablet (81 mg total) by mouth once daily.    blood sugar diagnostic Strp To check BG 2 times daily, to use with insurance preferred meter    clopidogreL (PLAVIX) 75 mg tablet Take 1 tablet (75 mg total) by mouth once daily.    cyanocobalamin (VITAMIN B-12) 1000 MCG tablet Take 100 mcg by mouth once daily.    folic acid (FOLVITE) 1 MG tablet Take 1 mg by mouth once daily.    insulin NPH/Reg human (HUMULIN, 70/30,) 100 unit/mL (70-30) InPn pen To do 28 units subq in AM and 8 units subq in PM.  Dose to be done within 15 min before or after meal. (Patient taking differently: To do 28 units subq in AM and 8 units subq in PM.  Dose to be done within 15 min before or after meal.)    lancets Misc To check BG 2 times daily, to use with insurance preferred meter    metoprolol tartrate (LOPRESSOR) 50 MG tablet Take 1 tablet (50 mg total) by mouth 2 (two) times daily.    semaglutide (OZEMPIC) 0.25 mg or 0.5 mg(2 mg/1.5 mL) pen injector Inject  0.25 mg into the skin every 7 days.    azelastine (ASTELIN) 137 mcg (0.1 %) nasal spray SMARTSI Spray(s) Both Nares Twice Daily PRN    sevelamer carbonate (RENVELA) 800 mg Tab TAKE 2 TABLETS BY MOUTH THREE TIMES DAILY    telmisartan (MICARDIS) 20 MG Tab Take 1 tablet (20 mg total) by mouth once daily.    traMADoL (ULTRAM) 50 mg tablet Take 1 tablet (50 mg total) by mouth every 8 (eight) hours as needed for Pain.    triamcinolone acetonide 0.1% (KENALOG) 0.1 % cream Apply topically 2 (two) times daily.    [DISCONTINUED] benzonatate (TESSALON) 200 MG capsule Take 200 mg by mouth 3 (three) times daily.    [DISCONTINUED] isosorbide-hydrALAZINE 20-37.5 mg (BIDIL) 20-37.5 mg Tab Take 1 tablet by mouth 3 (three) times daily.     Family History       Problem Relation (Age of Onset)    Cancer Maternal Grandmother    Diabetes Mother    Heart disease Mother    Hyperlipidemia Mother    Hypertension Mother    Muscular dystrophy Son          Tobacco Use    Smoking status: Former Smoker     Packs/day: 1.50     Years: 30.00     Pack years: 45.00     Types: Cigarettes     Quit date: 1990     Years since quittin.5    Smokeless tobacco: Former User   Substance and Sexual Activity    Alcohol use: No     Alcohol/week: 0.0 standard drinks    Drug use: No    Sexual activity: Not Currently     Review of Systems   Constitutional:  Positive for activity change and fatigue.   HENT:  Positive for congestion.    Eyes: Negative.    Respiratory:  Positive for cough, chest tightness and shortness of breath.    Gastrointestinal: Negative.    Endocrine: Negative.    Genitourinary: Negative.    Musculoskeletal: Negative.    Skin: Negative.    Allergic/Immunologic: Negative.    Neurological:  Positive for weakness.   Hematological: Negative.    Psychiatric/Behavioral: Negative.     Objective:     Vital Signs (Most Recent):  Temp: 99 °F (37.2 °C) (22 1237)  Pulse: 72 (22 1500)  Resp: 20 (22 1237)  BP: (!) 93/44  (08/07/22 1237)  SpO2: 100 % (08/07/22 1500)   Vital Signs (24h Range):  Temp:  [99 °F (37.2 °C)-99.6 °F (37.6 °C)] 99 °F (37.2 °C)  Pulse:  [72-92] 72  Resp:  [20-38] 20  SpO2:  [97 %-100 %] 100 %  BP: ()/(44-84) 93/44     Weight: 60.6 kg (133 lb 8 oz)  Body mass index is 22.22 kg/m².    Physical Exam  Vitals and nursing note reviewed.   Constitutional:       Appearance: Normal appearance. She is ill-appearing.   HENT:      Head: Normocephalic and atraumatic.      Nose: Nose normal.      Mouth/Throat:      Mouth: Mucous membranes are moist.   Eyes:      Extraocular Movements: Extraocular movements intact.      Conjunctiva/sclera: Conjunctivae normal.      Pupils: Pupils are equal, round, and reactive to light.   Cardiovascular:      Rate and Rhythm: Normal rate and regular rhythm.      Pulses: Normal pulses.      Heart sounds: Normal heart sounds. No murmur heard.    No friction rub.   Pulmonary:      Effort: Pulmonary effort is normal. No respiratory distress.      Breath sounds: Rhonchi present.   Abdominal:      General: Abdomen is flat. Bowel sounds are normal.      Palpations: Abdomen is soft.   Musculoskeletal:         General: No swelling or tenderness. Normal range of motion.      Cervical back: Normal range of motion. No rigidity or tenderness.   Skin:     General: Skin is warm.      Coloration: Skin is not jaundiced.   Neurological:      General: No focal deficit present.      Mental Status: She is alert and oriented to person, place, and time. Mental status is at baseline.      Cranial Nerves: No cranial nerve deficit.      Sensory: No sensory deficit.      Motor: No weakness.   Psychiatric:         Mood and Affect: Mood normal.         CRANIAL NERVES     CN III, IV, VI   Pupils are equal, round, and reactive to light.     Significant Labs: All pertinent labs within the past 24 hours have been reviewed.      Significant Imaging: I have reviewed all pertinent imaging results/findings within the past  24 hours.      Assessment/Plan:     * COVID  Patient is identified as Severe COVID-19 based on hypoxemia with O2 saturations <94% on room air or on ambulation   Initiate standard COVID protocols; COVID-19 testing ,Infection Control notification  and isolation- respiratory, contact and droplet per protocol    Diagnostics: (leukopenia, hyponatremia, hyperferritinemia, elevated troponin, elevated d-dimer, age, and comorbidities are significant predictors of poor clinical outcome)  CBC, CMP, Procalcitonin, Ferritin, CRP, LDH and Portable CXR    Management: Initiate targeted therapy with Remdesivir, 200mg IV x1, followed by 100mg IV daily x5 days total, Dexamethasone PO/IV 6mg daily x10 days and Anticoagulation, Patient admitted to non-critical care unit- Will initiate full dose anticoagulation with Unfractionated heparin, Inhaled bronchodilators as needed for shortness of breath. and Continuous cardiac monitoring.     Start remdesevir and decadron  S/p MAB infusion   Monitor inflammatory marker s    S/P CABG (coronary artery bypass graft)  Cont BB, asa , plavix  Cardiology consulted       CAD (coronary artery disease)  Cardiology consul;yasir  EKG no new finding   Cont BB, statin and plavix  Cont Heparin drip  Trend troponin       Anemia in chronic kidney disease, on chronic dialysis  Stable  Monitor     History of CVA (cerebrovascular accident)    Resume asa , and plavix  Stable     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, with long-term current use of insulin  Patient's FSGs are controlled on current medication regimen.  Last A1c reviewed-   Lab Results   Component Value Date    HGBA1C 9.3 (H) 07/12/2022     Most recent fingerstick glucose reviewed- No results for input(s): POCTGLUCOSE in the last 24 hours.  Current correctional scale  Low  Maintain anti-hyperglycemic dose as follows-   Antihyperglycemics (From admission, onward)            Start     Stop Route Frequency Ordered     08/07/22 1637  insulin aspart U-100 pen 0-5 Units         -- SubQ Before meals & nightly PRN 08/07/22 1537        Hold Oral hypoglycemics while patient is in the hospital.    ESRD (end stage renal disease)  HD per nephrology     Hypertension associated with diabetes  Resume home medication       VTE Risk Mitigation (From admission, onward)         Ordered     IP VTE HIGH RISK PATIENT  Once         08/07/22 1549     Place sequential compression device  Until discontinued         08/07/22 1549     heparin 25,000 units in dextrose 5% (100 units/ml) IV bolus from bag - ADDITIONAL PRN BOLUS - 60 units/kg (max bolus 4000 units)  As needed (PRN)        Question:  Heparin Infusion Adjustment (DO NOT MODIFY ANSWER)  Answer:  [email or web address]\epic\Images\Pharmacy\HeparinInfusions\heparin LOW INTENSITY nomogram for [facility] [id number]    08/07/22 0951     heparin 25,000 units in dextrose 5% (100 units/ml) IV bolus from bag - ADDITIONAL PRN BOLUS - 30 units/kg (max bolus 4000 units)  As needed (PRN)        Question:  Heparin Infusion Adjustment (DO NOT MODIFY ANSWER)  Answer:  [email or web address]\epic\Images\Pharmacy\HeparinInfusions\heparin LOW INTENSITY nomogram for [facility] [id number]    08/07/22 0951     heparin 25,000 units in dextrose 5% 250 mL (100 units/mL) infusion LOW INTENSITY nomogram - [facility]  Continuous        Question Answer Comment   Heparin Infusion Adjustment (DO NOT MODIFY ANSWER) [email or web address]\epic\Images\Pharmacy\HeparinInfusions\heparin LOW INTENSITY nomogram for [facility] [id number]    Begin at (in units/kg/hr) 12        08/07/22 0951                   Suhas Prakash MD  Department of Hospital Medicine   [facility] - Telemetry ([facility])

## 2022-08-07 NOTE — NURSING
Lab and multiple nurses tried to draw APTT but unsuccessful. MD notified, Midline ordered and waiting on placement.

## 2022-08-07 NOTE — ASSESSMENT & PLAN NOTE
Cardiology consul;yasir  EKG no new finding   Cont BB, statin and plavix  Cont Heparin drip  Trend troponin

## 2022-08-07 NOTE — HPI
87-year-old  female 3 weeks post CABG with a positive COVID test on 08/03 presenting with chest tightness overnight.  She has cough with some intermittent shortness of breath.  She denies any nausea vomiting or diaphoresis.  She denies any chest pain simply tightness.  Denies any fevers or chills.  Patient denies any nausea vomiting.  No diaphoresis.  Patient seen and examined in no distress. Plan trend troponin and continue heparin.

## 2022-08-07 NOTE — ASSESSMENT & PLAN NOTE
Patient's FSGs are controlled on current medication regimen.  Last A1c reviewed-   Lab Results   Component Value Date    HGBA1C 9.3 (H) 07/12/2022     Most recent fingerstick glucose reviewed- No results for input(s): POCTGLUCOSE in the last 24 hours.  Current correctional scale  Low  Maintain anti-hyperglycemic dose as follows-   Antihyperglycemics (From admission, onward)            Start     Stop Route Frequency Ordered    08/07/22 1637  insulin aspart U-100 pen 0-5 Units         -- SubQ Before meals & nightly PRN 08/07/22 1537        Hold Oral hypoglycemics while patient is in the hospital.

## 2022-08-07 NOTE — PHARMACY MED REC
"Admission Medication History     The home medication history was taken by Randal Don.    You may go to "Admission" then "Reconcile Home Medications" tabs to review and/or act upon these items.      The home medication list has been updated by the Pharmacy department.    Please read ALL comments highlighted in yellow.    Please address this information as you see fit.     Feel free to contact us if you have any questions or require assistance.      Medications listed below were obtained from: Patient/family and Analytic software- [employer]  (Not in a hospital admission)      Randal Don  [phone]    Current Outpatient Medications on File Prior to Encounter   Medication Sig Dispense Refill Last Dose    ACCU-CHEK ARDEN PLUS METER Misc USE TO TEST TWICE DAILY  0 8/6/2022    aspirin (ECOTRIN) 81 MG EC tablet Take 1 tablet (81 mg total) by mouth once daily. 90 tablet 1 8/7/2022    blood sugar diagnostic Strp To check BG 2 times daily, to use with insurance preferred meter 100 each 6 8/7/2022    clopidogreL (PLAVIX) 75 mg tablet Take 1 tablet (75 mg total) by mouth once daily. 90 tablet 3 8/6/2022    cyanocobalamin (VITAMIN B-12) 1000 MCG tablet Take 100 mcg by mouth once daily.   8/6/2022    folic acid (FOLVITE) 1 MG tablet Take 1 mg by mouth once daily.   8/6/2022    insulin NPH/Reg human (HUMULIN, 70/30,) 100 unit/mL (70-30) InPn pen To do 28 units subq in AM and 8 units subq in PM.  Dose to be done within 15 min before or after meal. (Patient taking differently: To do 28 units subq in AM and 8 units subq in PM.  Dose to be done within 15 min before or after meal.) 3 each 1 8/6/2022    lancets Misc To check BG 2 times daily, to use with insurance preferred meter 100 each 6 8/6/2022    metoprolol tartrate (LOPRESSOR) 50 MG tablet Take 1 tablet (50 mg total) by mouth 2 (two) times daily. 180 tablet 1 8/6/2022    semaglutide (OZEMPIC) 0.25 mg or 0.5 mg(2 mg/1.5 mL) pen injector Inject 0.25 mg " into the skin every 7 days. 1 pen 1 2022    azelastine (ASTELIN) 137 mcg (0.1 %) nasal spray SMARTSI Spray(s) Both Nares Twice Daily PRN   More than a month    sevelamer carbonate (RENVELA) 800 mg Tab TAKE 2 TABLETS BY MOUTH THREE TIMES DAILY 180 tablet 0 Unknown    telmisartan (MICARDIS) 20 MG Tab Take 1 tablet (20 mg total) by mouth once daily. 90 tablet 1 Unknown    traMADoL (ULTRAM) 50 mg tablet Take 1 tablet (50 mg total) by mouth every 8 (eight) hours as needed for Pain. 10 tablet 0 More than a month    triamcinolone acetonide 0.1% (KENALOG) 0.1 % cream Apply topically 2 (two) times daily. 80 g 2 More than a month                           .

## 2022-08-07 NOTE — PROGRESS NOTES
77-year-old female well known to me from chronic dialysis.  Recent CABG, approximately 3 weeks post surgery.  Has extensive past medical history.  Admitted with symptomatic COVID-19 infection with relative hypoxia.    Chart reviewed.  Electrolytes and acid-base are stable.  Chest x-ray appears consistent with COVID-19 pneumonia.    Will plan dialysis tomorrow.    Formal consult to follow.

## 2022-08-08 LAB
ALBUMIN SERPL BCP-MCNC: 2.2 G/DL (ref 3.5–5.2)
ALP SERPL-CCNC: 60 U/L (ref 55–135)
ALT SERPL W/O P-5'-P-CCNC: 9 U/L (ref 10–44)
ANION GAP SERPL CALC-SCNC: 9 MMOL/L (ref 8–16)
APTT BLDCRRT: 52.7 SEC (ref 21–32)
APTT BLDCRRT: 58.9 SEC (ref 21–32)
AST SERPL-CCNC: 34 U/L (ref 10–40)
BASOPHILS # BLD AUTO: 0.02 K/UL (ref 0–0.2)
BASOPHILS NFR BLD: 0.4 % (ref 0–1.9)
BILIRUB SERPL-MCNC: 0.5 MG/DL (ref 0.1–1)
BUN SERPL-MCNC: 35 MG/DL (ref 8–23)
CALCIUM SERPL-MCNC: 7.2 MG/DL (ref 8.7–10.5)
CHLORIDE SERPL-SCNC: 108 MMOL/L (ref 95–110)
CO2 SERPL-SCNC: 18 MMOL/L (ref 23–29)
CREAT SERPL-MCNC: 4.3 MG/DL (ref 0.5–1.4)
DIFFERENTIAL METHOD: ABNORMAL
EOSINOPHIL # BLD AUTO: 0 K/UL (ref 0–0.5)
EOSINOPHIL NFR BLD: 0.4 % (ref 0–8)
ERYTHROCYTE [DISTWIDTH] IN BLOOD BY AUTOMATED COUNT: 17.2 % (ref 11.5–14.5)
ERYTHROCYTE [SEDIMENTATION RATE] IN BLOOD BY WESTERGREN METHOD: 3 MM/HR (ref 0–20)
EST. GFR  (NO RACE VARIABLE): 10 ML/MIN/1.73 M^2
FERRITIN SERPL-MCNC: 2997 NG/ML (ref 20–300)
GLUCOSE SERPL-MCNC: 158 MG/DL (ref 70–110)
HCT VFR BLD AUTO: 23.1 % (ref 37–48.5)
HGB BLD-MCNC: 7.6 G/DL (ref 12–16)
IMM GRANULOCYTES # BLD AUTO: 0.03 K/UL (ref 0–0.04)
IMM GRANULOCYTES NFR BLD AUTO: 0.6 % (ref 0–0.5)
LYMPHOCYTES # BLD AUTO: 0.7 K/UL (ref 1–4.8)
LYMPHOCYTES NFR BLD: 12.5 % (ref 18–48)
MCH RBC QN AUTO: 30.6 PG (ref 27–31)
MCHC RBC AUTO-ENTMCNC: 32.9 G/DL (ref 32–36)
MCV RBC AUTO: 93 FL (ref 82–98)
MONOCYTES # BLD AUTO: 0.4 K/UL (ref 0.3–1)
MONOCYTES NFR BLD: 7.2 % (ref 4–15)
NEUTROPHILS # BLD AUTO: 4.3 K/UL (ref 1.8–7.7)
NEUTROPHILS NFR BLD: 78.9 % (ref 38–73)
NRBC BLD-RTO: 0 /100 WBC
PLATELET # BLD AUTO: 244 K/UL (ref 150–450)
PMV BLD AUTO: 11.1 FL (ref 9.2–12.9)
POCT GLUCOSE: 128 MG/DL (ref 70–110)
POCT GLUCOSE: 142 MG/DL (ref 70–110)
POCT GLUCOSE: 171 MG/DL (ref 70–110)
POCT GLUCOSE: 172 MG/DL (ref 70–110)
POCT GLUCOSE: 184 MG/DL (ref 70–110)
POTASSIUM SERPL-SCNC: 5 MMOL/L (ref 3.5–5.1)
PROT SERPL-MCNC: 5.3 G/DL (ref 6–8.4)
RBC # BLD AUTO: 2.48 M/UL (ref 4–5.4)
SODIUM SERPL-SCNC: 135 MMOL/L (ref 136–145)
TROPONIN I SERPL DL<=0.01 NG/ML-MCNC: 0.21 NG/ML (ref 0–0.03)
TROPONIN I SERPL DL<=0.01 NG/ML-MCNC: 0.23 NG/ML (ref 0–0.03)
WBC # BLD AUTO: 5.45 K/UL (ref 3.9–12.7)

## 2022-08-08 PROCEDURE — 99214 PR OFFICE/OUTPT VISIT, EST, LEVL IV, 30-39 MIN: ICD-10-PCS | Mod: CR,,, | Performed by: INTERNAL MEDICINE

## 2022-08-08 PROCEDURE — 63600175 PHARM REV CODE 636 W HCPCS: Performed by: EMERGENCY MEDICINE

## 2022-08-08 PROCEDURE — 25000003 PHARM REV CODE 250: Performed by: INTERNAL MEDICINE

## 2022-08-08 PROCEDURE — 36415 COLL VENOUS BLD VENIPUNCTURE: CPT | Performed by: INTERNAL MEDICINE

## 2022-08-08 PROCEDURE — G0257 UNSCHED DIALYSIS ESRD PT HOS: HCPCS

## 2022-08-08 PROCEDURE — 96366 THER/PROPH/DIAG IV INF ADDON: CPT

## 2022-08-08 PROCEDURE — 99214 OFFICE O/P EST MOD 30 MIN: CPT | Mod: CR,,, | Performed by: INTERNAL MEDICINE

## 2022-08-08 PROCEDURE — 94761 N-INVAS EAR/PLS OXIMETRY MLT: CPT

## 2022-08-08 PROCEDURE — 82728 ASSAY OF FERRITIN: CPT | Performed by: INTERNAL MEDICINE

## 2022-08-08 PROCEDURE — 80100014 HC HEMODIALYSIS 1:1

## 2022-08-08 PROCEDURE — 80053 COMPREHEN METABOLIC PANEL: CPT | Performed by: INTERNAL MEDICINE

## 2022-08-08 PROCEDURE — G0378 HOSPITAL OBSERVATION PER HR: HCPCS

## 2022-08-08 PROCEDURE — 85025 COMPLETE CBC W/AUTO DIFF WBC: CPT | Performed by: EMERGENCY MEDICINE

## 2022-08-08 PROCEDURE — 99224 PR SUBSEQUENT OBSERVATION CARE,LEVEL I: CPT | Mod: ,,, | Performed by: INTERNAL MEDICINE

## 2022-08-08 PROCEDURE — 27000221 HC OXYGEN, UP TO 24 HOURS

## 2022-08-08 PROCEDURE — 63600175 PHARM REV CODE 636 W HCPCS: Mod: TB | Performed by: INTERNAL MEDICINE

## 2022-08-08 PROCEDURE — 96365 THER/PROPH/DIAG IV INF INIT: CPT | Mod: 59

## 2022-08-08 PROCEDURE — 84484 ASSAY OF TROPONIN QUANT: CPT | Performed by: INTERNAL MEDICINE

## 2022-08-08 PROCEDURE — 99900035 HC TECH TIME PER 15 MIN (STAT)

## 2022-08-08 PROCEDURE — 85730 THROMBOPLASTIN TIME PARTIAL: CPT | Mod: 91 | Performed by: INTERNAL MEDICINE

## 2022-08-08 PROCEDURE — 85651 RBC SED RATE NONAUTOMATED: CPT | Performed by: INTERNAL MEDICINE

## 2022-08-08 PROCEDURE — 99224 PR SUBSEQUENT OBSERVATION CARE,LEVEL I: ICD-10-PCS | Mod: ,,, | Performed by: INTERNAL MEDICINE

## 2022-08-08 RX ORDER — BENZONATATE 100 MG/1
100 CAPSULE ORAL 3 TIMES DAILY PRN
Status: DISCONTINUED | OUTPATIENT
Start: 2022-08-08 | End: 2022-08-09 | Stop reason: HOSPADM

## 2022-08-08 RX ADMIN — GUAIFENESIN AND DEXTROMETHORPHAN 5 ML: 100; 10 SYRUP ORAL at 01:08

## 2022-08-08 RX ADMIN — METOPROLOL TARTRATE 50 MG: 50 TABLET, FILM COATED ORAL at 09:08

## 2022-08-08 RX ADMIN — REMDESIVIR 100 MG: 100 INJECTION, POWDER, LYOPHILIZED, FOR SOLUTION INTRAVENOUS at 12:08

## 2022-08-08 RX ADMIN — HEPARIN SODIUM AND DEXTROSE 14 UNITS/KG/HR: 10000; 5 INJECTION INTRAVENOUS at 01:08

## 2022-08-08 RX ADMIN — GUAIFENESIN AND DEXTROMETHORPHAN 5 ML: 100; 10 SYRUP ORAL at 05:08

## 2022-08-08 RX ADMIN — GUAIFENESIN AND DEXTROMETHORPHAN 5 ML: 100; 10 SYRUP ORAL at 11:08

## 2022-08-08 RX ADMIN — ACETAMINOPHEN 650 MG: 325 TABLET ORAL at 12:08

## 2022-08-08 RX ADMIN — GUAIFENESIN AND DEXTROMETHORPHAN 5 ML: 100; 10 SYRUP ORAL at 12:08

## 2022-08-08 NOTE — ASSESSMENT & PLAN NOTE
Patient's FSGs are controlled on current medication regimen.  Last A1c reviewed-   Lab Results   Component Value Date    HGBA1C 9.3 (H) 07/12/2022     Most recent fingerstick glucose reviewed-   Recent Labs   Lab 08/07/22  1640 08/07/22  2016 08/08/22  0523 08/08/22  1250   POCTGLUCOSE 142* 200* 184* 128*     Current correctional scale  Low  Maintain anti-hyperglycemic dose as follows-   Antihyperglycemics (From admission, onward)            Start     Stop Route Frequency Ordered    08/07/22 1637  insulin aspart U-100 pen 0-5 Units         -- SubQ Before meals & nightly PRN 08/07/22 1537        Hold Oral hypoglycemics while patient is in the hospital.  08/08:  --stable in hospital  --HA1C 9.3  --diabetes educator consulted

## 2022-08-08 NOTE — ASSESSMENT & PLAN NOTE
Patient is identified as Severe COVID-19 based on hypoxemia with O2 saturations <94% on room air or on ambulation   Initiate standard COVID protocols; COVID-19 testing ,Infection Control notification  and isolation- respiratory, contact and droplet per protocol    Diagnostics: (leukopenia, hyponatremia, hyperferritinemia, elevated troponin, elevated d-dimer, age, and comorbidities are significant predictors of poor clinical outcome)  CBC, CMP, Procalcitonin, Ferritin, CRP, LDH and Portable CXR    Management: Initiate targeted therapy with Remdesivir, 200mg IV x1, followed by 100mg IV daily x5 days total, Dexamethasone PO/IV 6mg daily x10 days and Anticoagulation, Patient admitted to non-critical care unit- Will initiate full dose anticoagulation with Unfractionated heparin, Inhaled bronchodilators as needed for shortness of breath. and Continuous cardiac monitoring.     --IV remdesevir  --PO dexamethasone  --supplemental oxygen

## 2022-08-08 NOTE — HOSPITAL COURSE
87-year-old  female 3 weeks post CABG with a positive COVID test on 08/03 presenting with chest tightness overnight.  She has cough with some intermittent shortness of breath.  She denies any nausea vomiting or diaphoresis.  She denies any chest pain simply tightness.  Denies any fevers or chills.  Patient denies any nausea vomiting.  No diaphoresis.  Patient seen and examined in no distress. Plan trend troponin and continue heparin.     08/08 serial troponin flat. BNP > 4900, had HD today  no cp o/n discussed with HM. No exam due to + COVID 19 infection

## 2022-08-08 NOTE — ASSESSMENT & PLAN NOTE
Cardiology consulted  EKG no new finding   Cont BB, statin and plavix  Cont Heparin drip  Trend troponin   08/08:  --0235>>0.251>>0.228>>0.206  --likely demand ischemia

## 2022-08-08 NOTE — NURSING
08/08/22 1200   Post-Hemodialysis Assessment   Blood Volume Processed (Liters) 71.6 L   Dialyzer Clearance Lightly streaked   Total UF (mL) 1500 mL   Patient Response to Treatment Tolerated well   Post-Hemodialysis Comments Treatment completed x3 hours. No issues during treatment. Left AVF accessed without difficulty

## 2022-08-08 NOTE — HOSPITAL COURSE
Patient was kept on OBS for COVID under the care of hospital medicine, she was started on PO dexamethasone and IV remdesivir. Nephrology was consulted for ESRD on HD. 08/08: Patient with elevated temp overnight - she is requiring 2L oxygen via NC to maintain oxygen sats. Continue PO dexamethasone & IV remdesivir. Getting HD at present. Monitor H/H - getting epo with HD - repeat CBC in AM.  08/09:Patient improved overnight - afebrile. Labs stable with increase in HGB. She is stable to dc home. She will continue PO dexamethasone, tessalon pearls, MVI, vit C, pepcid, and melatonin. Patient high risk for readmission. Referred to NP at home program for ongoing management, to increase compliance, and decrease readmission. Patient was seen and examined today and deemed stable for discharge home.

## 2022-08-08 NOTE — CONSULTS
[facility] ([facility])  Nephrology  Consult Note    Patient Name: Avril Monzon  MRN: [id number]  Admission Date: 8/7/2022  Hospital Length of Stay: 0 days  Attending Provider: Suhas Prakash, *   Primary Care Physician: Rose Parks MD  Principal Problem:COVID    Inpatient consult to Nephrology  Consult performed by: dArian Ramirez MD  Consult ordered by: Suhas Prakash MD  Reason for consult: End-stage renal disease        Subjective:     HPI:     77-year-old female well known to me from previous admissions.  Recently diagnosed with COVID-19 pneumonia.  Has history of end-stage renal disease.  Admitted with shortness of breath.  Patient was seen in her hospital room on dialysis.  In bed resting comfortably.  States that her breathing has improved since admission.    Past Medical History:   Diagnosis Date    Acute hypoxemic respiratory failure 11/26/2018    Anemia     Arthritis     back, hand, knees    Back pain     Cataract     CKD stage G4/A3, GFR 15 - 29 and albumin creatinine ratio >300 mg/g     GFR 40% Jan 2014 and 33% ub 3/2014 ([facility])    Coronary artery disease involving native coronary artery of native heart 11/30/2018    Diabetic retinopathy     DM (diabetes mellitus) type II controlled, neurological manifestation     Exudative age-related macular degeneration of left eye with active choroidal neovascularization 2/5/2019    GERD (gastroesophageal reflux disease)     Glaucoma     Heart failure     Hyperlipidemia     Hypertension     Non-ST elevation MI (NSTEMI) 11/28/2018    NSTEMI (non-ST elevated myocardial infarction) 11/27/2018    Peripheral neuropathy     Polyneuropathy     Proteinuria     >6 mo     Seizures     [facility] 1/2014 -dick CT NRI showed small vessel    Stroke 2012,2014    Tobacco dependence     Type 2 diabetes with peripheral circulatory disorder, controlled        Past Surgical History:   Procedure Laterality Date    BREAST LUMPECTOMY Left      benign, when patient was 15 years old    BREAST MASS EXCISION      ,benign, age 15.    CATHETERIZATION OF BOTH LEFT AND RIGHT HEART N/A 11/28/2018    Procedure: CATHETERIZATION, HEART, BOTH LEFT AND RIGHT;  Surgeon: Michelle Holman MD;  Location: [facility] CATH LAB;  Service: Cardiology;  Laterality: N/A;    CATHETERIZATION OF BOTH LEFT AND RIGHT HEART N/A 11/30/2018    Procedure: CATHETERIZATION, HEART, BOTH LEFT AND RIGHT;  Surgeon: Michelle Holman MD;  Location: [facility] CATH LAB;  Service: Cardiology;  Laterality: N/A;    CORONARY ARTERY BYPASS GRAFT (CABG) N/A 7/20/2022    Procedure: CORONARY ARTERY BYPASS GRAFT (CABG);  Surgeon: Sanju Locke MD;  Location: [facility] OR;  Service: Cardiothoracic;  Laterality: N/A;  2-VESSEL PUMP ASSIST WITH EPI-AORTIC ULTRASOUND    ENDOSCOPIC HARVEST OF VEIN Left 7/20/2022    Procedure: SURGICAL PROCUREMENT, VEIN, ENDOSCOPIC;  Surgeon: Sanju Locke MD;  Location: [facility];  Service: Cardiothoracic;  Laterality: Left;    HYSTERECTOMY  1982    INJECTION OF ANESTHETIC AGENT AROUND MULTIPLE INTERCOSTAL NERVES N/A 7/20/2022    Procedure: BLOCK, NERVE, INTERCOSTAL, 2 OR MORE;  Surgeon: Sanju Locke MD;  Location: [facility] OR;  Service: Cardiothoracic;  Laterality: N/A;  PARASTERNAL NERVE BLOCK    INSERTION OF SHUNT      LEFT HEART CATHETERIZATION Left 12/3/2018    Procedure: CATHETERIZATION, HEART, LEFT;  Surgeon: Michelle Holman MD;  Location: [facility] CATH LAB;  Service: Cardiology;  Laterality: Left;  LAD ATHERECTOMY STENT/ FEMORAL APPROACH    LEFT HEART CATHETERIZATION Left 7/13/2022    Procedure: CATHETERIZATION, HEART, LEFT;  Surgeon: Abhi Sloan MD;  Location: [facility] CATH LAB;  Service: Cardiology;  Laterality: Left;    OOPHORECTOMY      wrist cyst Right 1980s    dorsal wrist cyst       Review of patient's allergies indicates:   Allergen Reactions    Codeine Swelling    Darvon [propoxyphene] Swelling    Atorvastatin      Muscle twitching     Current  Facility-Administered Medications   Medication Frequency    acetaminophen tablet 650 mg Q8H PRN    aspirin EC tablet 81 mg Daily    clopidogreL tablet 75 mg Daily    dexAMETHasone tablet 6 mg Daily    dextromethorphan-guaiFENesin  mg/5 ml liquid 5 mL Q6H    dextrose 10% bolus 125 mL PRN    dextrose 10% bolus 250 mL PRN    glucagon (human recombinant) injection 1 mg PRN    glucose chewable tablet 16 g PRN    glucose chewable tablet 24 g PRN    heparin 25,000 units in dextrose 5% (100 units/ml) IV bolus from bag - ADDITIONAL PRN BOLUS - 30 units/kg (max bolus 4000 units) PRN    heparin 25,000 units in dextrose 5% (100 units/ml) IV bolus from bag - ADDITIONAL PRN BOLUS - 60 units/kg (max bolus 4000 units) PRN    heparin 25,000 units in dextrose 5% 250 mL (100 units/mL) infusion LOW INTENSITY nomogram - [facility] Continuous    insulin aspart U-100 pen 0-5 Units QID (AC + HS) PRN    metoprolol tartrate (LOPRESSOR) tablet 50 mg BID    ondansetron injection 4 mg Q6H PRN    remdesivir 100 mg in sodium chloride 0.9% 250 mL infusion Daily    sodium chloride 0.9% bolus 250 mL PRN    sodium chloride 0.9% flush 10 mL PRN     Family History     Problem Relation (Age of Onset)    Cancer Maternal Grandmother    Diabetes Mother    Heart disease Mother    Hyperlipidemia Mother    Hypertension Mother    Muscular dystrophy Son        Tobacco Use    Smoking status: Former Smoker     Packs/day: 1.50     Years: 30.00     Pack years: 45.00     Types: Cigarettes     Quit date: 1990     Years since quittin.6    Smokeless tobacco: Former User   Substance and Sexual Activity    Alcohol use: No     Alcohol/week: 0.0 standard drinks    Drug use: No    Sexual activity: Not Currently     Review of Systems   Constitutional: Negative.    HENT: Negative.    Eyes: Negative.    Respiratory: Positive for shortness of breath.    Cardiovascular: Negative.    Gastrointestinal: Negative.    Genitourinary: Negative.     Musculoskeletal: Negative.    Skin: Negative.    Neurological: Negative.      Objective:     Vital Signs (Most Recent):  Temp: 99 °F (37.2 °C) (08/08/22 0849)  Pulse: 78 (08/08/22 1100)  Resp: 18 (08/08/22 0849)  BP: (!) 120/56 (08/08/22 0849)  SpO2: 98 % (08/08/22 1100)  O2 Device (Oxygen Therapy): nasal cannula (08/08/22 0736) Vital Signs (24h Range):  Temp:  [97.7 °F (36.5 °C)-101.6 °F (38.7 °C)] 99 °F (37.2 °C)  Pulse:  [72-90] 78  Resp:  [18-20] 18  SpO2:  [96 %-100 %] 98 %  BP: ()/() 120/56     Weight: 60.6 kg (133 lb 8 oz) (08/07/22 0831)  Body mass index is 22.22 kg/m².  Body surface area is 1.67 meters squared.    I/O last 3 completed shifts:  In: 425.8 [I.V.:184.5; IV Piggyback:241.3]  Out: -     Physical Exam  Constitutional:       Appearance: Normal appearance.   HENT:      Head: Normocephalic and atraumatic.   Eyes:      General: No scleral icterus.     Extraocular Movements: Extraocular movements intact.      Pupils: Pupils are equal, round, and reactive to light.   Cardiovascular:      Rate and Rhythm: Normal rate and regular rhythm.   Pulmonary:      Effort: Pulmonary effort is normal.      Breath sounds: No stridor.   Musculoskeletal:      Right lower leg: No edema.      Left lower leg: No edema.   Skin:     General: Skin is warm and dry.   Neurological:      General: No focal deficit present.      Mental Status: She is alert and oriented to person, place, and time.   Psychiatric:         Mood and Affect: Mood normal.         Behavior: Behavior normal.         Significant Labs:  BMP:   Recent Labs   Lab 08/08/22  0511   *   *   K 5.0      CO2 18*   BUN 35*   CREATININE 4.3*   CALCIUM 7.2*     CMP:   Recent Labs   Lab 08/08/22  0511   *   CALCIUM 7.2*   ALBUMIN 2.2*   PROT 5.3*   *   K 5.0   CO2 18*      BUN 35*   CREATININE 4.3*   ALKPHOS 60   ALT 9*   AST 34   BILITOT 0.5     All labs within the past 24 hours have been reviewed.    Significant  Imaging:  Labs: Reviewed  Reviewed    Assessment/Plan:     Active Diagnoses:    Diagnosis Date Noted POA    PRINCIPAL PROBLEM:  COVID [U07.1] 08/03/2022 Yes    S/P CABG (coronary artery bypass graft) [Z95.1] 07/20/2022 Not Applicable    CAD (coronary artery disease) [I25.10] 11/30/2018 Yes    Anemia in chronic kidney disease, on chronic dialysis [N18.6, D63.1, Z99.2] 05/18/2017 Not Applicable    History of CVA (cerebrovascular accident) [Z86.73] 05/16/2017 Not Applicable    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, with long-term current use of insulin [E11.3553, Z79.4] 05/02/2016 Not Applicable    ESRD (end stage renal disease) [N18.6]  Yes    Hypertension associated with diabetes [E11.59, I15.2]  Yes     Chronic      Problems Resolved During this Admission:       1. End-stage renal disease:  Patient was seen on dialysis.  Resting comfortably.  Breathing has improved.  Will plan to continue Monday Wednesday Friday dialysis unless otherwise indicated.    2. Potassium is stable at 5.0.    3. Bicarbonate is slightly low at 18. Should improve with dialysis.    4. COVID-19:  Defer to primary service for management.  Patient states that breathing and cough have improved since admission.    Thank you for your consult.     Adrian Ramirez MD  Nephrology  [facility] Tele[facility] ([facility])

## 2022-08-08 NOTE — ASSESSMENT & PLAN NOTE
--elevated but flat - 2/2 demand ischemia  --cardiology following  --continue ASA, plavix, BB, statin

## 2022-08-08 NOTE — ASSESSMENT & PLAN NOTE
HD per nephrology  Daily weight. Please call the office if gain 3 pounds in 1 day or 5 pounds in 1 week.  Fluid restriction 1.0 liters a day  Na< 2 gm

## 2022-08-08 NOTE — ASSESSMENT & PLAN NOTE
Recent CABG and stable, would healing  Plan heparin for CP      08/08/22  Continue ASA Plavix and BB  resume statin

## 2022-08-08 NOTE — PLAN OF CARE
POC reviewed with pt. Pt verbalizes understanding of POC. No questions at this time.  AAOx4. NADN.  NSR on cardiac monitor.  Pt remains free of falls  No complaints at this time.  Safety measures in place. Will continue to monitor.  Informed pt to call for assistance before getting up. Pt verbalizes understanding.

## 2022-08-08 NOTE — PROGRESS NOTES
[facility] ([facility])  Cardiology  Progress Note    Patient Name: Avril Monzon  MRN: [id number]  Admission Date: 8/7/2022  Hospital Length of Stay: 0 days  Code Status: Full Code   Attending Physician: Suhas Prakash, *   Primary Care Physician: Rose Parks MD  Expected Discharge Date:   Principal Problem:COVID    Subjective:     Hospital Course:   87-year-old  female 3 weeks post CABG with a positive COVID test on 08/03 presenting with chest tightness overnight.  She has cough with some intermittent shortness of breath.  She denies any nausea vomiting or diaphoresis.  She denies any chest pain simply tightness.  Denies any fevers or chills.  Patient denies any nausea vomiting.  No diaphoresis.  Patient seen and examined in no distress. Plan trend troponin and continue heparin.     08/08 serial troponin flat. BNP > 4900, had HD today  no cp o/n discussed with HM. No exam due to + COVID 19 infection        No new subjective & objective note has been filed under this hospital service since the last note was generated.    Assessment and Plan:       NSTEMI (non-ST elevated myocardial infarction)  Elevated troponin demanding ischemia fro m CHF exacerbation    Ok to hold heparin   F/u as OP      Chronic diastolic heart failure  HD per nephrology  Daily weight. Please call the office if gain 3 pounds in 1 day or 5 pounds in 1 week.  Fluid restriction 1.0 liters a day  Na< 2 gm      CAD (coronary artery disease)  Recent CABG and stable, would healing  Plan heparin for CP      08/08/22  Continue ASA Plavix and BB  resume statin     Anemia in chronic kidney disease, on chronic dialysis  Per hospital medicine        VTE Risk Mitigation (From admission, onward)         Ordered     IP VTE HIGH RISK PATIENT  Once         08/07/22 1549     Place sequential compression device  Until discontinued         08/07/22 1549     heparin 25,000 units in dextrose 5% (100 units/ml) IV bolus from bag -  ADDITIONAL PRN BOLUS - 60 units/kg (max bolus 4000 units)  As needed (PRN)        Question:  Heparin Infusion Adjustment (DO NOT MODIFY ANSWER)  Answer:  [email or web address]\epic\Images\Pharmacy\HeparinInfusions\heparin LOW INTENSITY nomogram for [facility] [id number]    08/07/22 0951     heparin 25,000 units in dextrose 5% (100 units/ml) IV bolus from bag - ADDITIONAL PRN BOLUS - 30 units/kg (max bolus 4000 units)  As needed (PRN)        Question:  Heparin Infusion Adjustment (DO NOT MODIFY ANSWER)  Answer:  \\[email or web address]\epic\Images\Pharmacy\HeparinInfusions\heparin LOW INTENSITY nomogram for [facility] [id number].pdf    08/07/22 0951     heparin 25,000 units in dextrose 5% 250 mL (100 units/mL) infusion LOW INTENSITY nomogram - [facility]  Continuous        Question Answer Comment   Heparin Infusion Adjustment (DO NOT MODIFY ANSWER) \\[email or web address]\epic\Images\Pharmacy\HeparinInfusions\heparin LOW INTENSITY nomogram for [facility] [id number].pdf    Begin at (in units/kg/hr) 12        08/07/22 0951                Colton Abreu MD  Cardiology  [facility] ([facility])

## 2022-08-08 NOTE — PLAN OF CARE
[facility] (Hospital)  Initial Discharge Assessment       Primary Care Provider: Rose Parks MD    Admission Diagnosis: Troponin level elevated [R77.8]  Chest pain [R07.9]  COVID-19 virus infection [U07.1]    Admission Date: 8/7/2022  Expected Discharge Date:     Discharge Barriers Identified: None    Payor: HU[employer] MANAGED MEDICARE / Plan: HU[employer]NA SNP (SPECIAL NEEDS PLAN) / Product Type: Medicare Advantage /     Extended Emergency Contact Information  Primary Emergency Contact: Elizabeth Monzon  Address: [address] [place] [address] [facility]  Home Phone: [phone]  Mobile Phone: [phone]  Relation: Daughter  Secondary Emergency Contact: Myriam Don   [place]  Mobile Phone: [phone]  Relation: Sister    Discharge Plan A: Home Health         [facility] [address], [place] - [address]  [address]  [facility] [address]  Phone: [phone] Fax: [phone]    [employer] Symmetry[address] [address] [facility]  [address] [facility] [address]  Phone: [phone] Fax: [phone]    [facility] [address] [facility] [address]  [address]  [facility] [address]  Phone: [phone] Fax: [phone]      Initial Assessment (most recent)     Adult Discharge Assessment - 08/08/22 1350        Discharge Assessment    Assessment Type Discharge Planning Assessment     Confirmed/corrected address, phone number and insurance Yes     Confirmed Demographics Correct on Facesheet     Source of Information health record     Lives With child(robert), adult     Facility Arrived From: home     Do you expect to return to your current living situation? Yes     Do you have help at home or someone to help you manage your care at home? Yes     Who are your caregiver(s) and their phone number(s)? daughter and [facility]     Prior to hospitilization cognitive  status: Alert/Oriented     Current cognitive status: Alert/Oriented     Walking or Climbing Stairs Difficulty none     Dressing/Bathing Difficulty none     Equipment Currently Used at Home none     Readmission within 30 days? Yes     Patient currently being followed by outpatient case management? Yes     If yes, name of outpatient case management following: [facility] outpatient case management     Do you currently have service(s) that help you manage your care at home? Yes     [facility]     Is the pt/caregiver preference to resume services with current agency Yes     Do you take prescription medications? Yes     Do you have prescription coverage? Yes     Do you have any problems affording any of your prescribed medications? No     Is the patient taking medications as prescribed? yes     How do you get to doctors appointments? family or friend will provide     Are you on dialysis? Yes     Dialysis Name and Scheduled days [facility] M[facility]     Discharge Plan A Home Health     DME Needed Upon Discharge  oxygen     Discharge Plan discussed with: Patient     Discharge Barriers Identified None                 Readmission Assessment (most recent)     Readmission Assessment - 08/08/22 1354        Readmission    Why were you hospitalized in the last 30 days? s/p CABG     Why were you readmitted? New medical problem     When you left the hospital where did you go? Home with Home Health     Did patient/caregiver refused recommended DC plan? No     Tell me about what happened between when you left the hospital and the day you returned. per records patient discharged with [facility] and scheduled f/u 8/2/22 (which was completed per Epic). Patient returned to ED for COVID s/s, recieved infusion and was sent home, returned to ED and was admitted.     Did you have  a follow-up appointment on discharge? Yes     Did you go? Yes     Was this a planned readmission? No                Anticipated DC Dispo: home  with daughter and continued [facility]  Prior Level of Function: independent, lives with daughter. Daughter provided transport home last admission.[facility].  PCP: Dr. Germain  Comments:  CM performed per chart review as patient and daughter not answering phone at this time. Per records patient is independent, will follow for possible home O2 and resumption of home health.

## 2022-08-08 NOTE — PROGRESS NOTES
[facility] - Telemetry ([facility])  [facility] Medicine  Progress Note    Patient Name: Avril Monzon  MRN: [id number]  Patient Class: OP- Observation   Admission Date: 8/7/2022  Length of Stay: 0 days  Attending Physician: Suhas Prakash, *  Primary Care Provider: Rose Parks MD        Subjective:     Principal Problem:COVID          HPI:  77 y/o female with a pmh of ESRD on HD, DM2, hyperlipidemia,CAD s/p CABG  , Tobacco abuse , HTN   and h/o CVA presented to the ER wth a C/O Cough for the last 7 days days  which has gotten worse . She was Dx with COVID-19 infection on 8/3/22 an d/c home from the ER . She is s/p MAB infusion on 8/4/22 . She is s/p CABG  on 8/20  and d/c home ( LOS 16 days , D/c on 8/28). She is complaining of dry cough  associated with SOB and chest tightness .  It is also associted with  fatigue and tiredness . She denies any fever , chills , GI or  sx . The chest pain pain is located on the mid sternum and is worsening  with cough .   ER COURSE: CXR show b/l pulmonary congestion , troponin 0.290 , BNP> 4900 . Cardiology consulted and Rec to start heaprin drip .  ER VS:   BP Pulse Resp Temp SpO2   08/07/22 0812 08/07/22 0812 08/07/22 0812 08/07/22 0859 08/07/22 0812   (!) 179/73 92 20 99.6 °F (37.6 °C) 98 %         Pt will be admitted to observation with a Dx of COVID-19 infection , elevated troponin and ESRD  CODE STATUS : FULL CODE      Overview/Hospital Course:  Patient was kept on OBS for COVID under the care of hospital medicine, she was started on PO dexamethasone and IV remdesivir. Nephrology was consulted for ESRD on HD.      Interval History: Patient with elevated temp overnight - she is requiring 2L oxygen via NC to maintain oxygen sats. Continue PO dexamethasone & IV remdesivir. Getting HD at present. Monitor H/H - getting epo with HD - repeat CBC in AM.    Review of Systems   Constitutional:  Positive for activity change and fatigue.   HENT:  Positive for congestion. Negative  for rhinorrhea and trouble swallowing.    Eyes:  Negative for pain and itching.   Respiratory:  Positive for cough, chest tightness and shortness of breath.    Cardiovascular:  Negative for chest pain, palpitations and leg swelling.   Gastrointestinal:  Negative for abdominal distention, abdominal pain, constipation, diarrhea, nausea and vomiting.   Endocrine: Negative for polydipsia and polyphagia.   Genitourinary:  Negative for difficulty urinating and flank pain.   Musculoskeletal:  Negative for arthralgias and gait problem.   Skin:  Negative for color change and wound.   Allergic/Immunologic: Negative.    Neurological:  Positive for weakness. Negative for dizziness and headaches.   Hematological: Negative.    Psychiatric/Behavioral:  Negative for agitation, behavioral problems and confusion. The patient is not nervous/anxious.    Objective:     Vital Signs (Most Recent):  Temp: 99 °F (37.2 °C) (08/08/22 0849)  Pulse: 88 (08/08/22 1300)  Resp: 18 (08/08/22 0849)  BP: (!) 120/56 (08/08/22 0849)  SpO2: 96 % (08/08/22 1300)   Vital Signs (24h Range):  Temp:  [97.7 °F (36.5 °C)-101.6 °F (38.7 °C)] 99 °F (37.2 °C)  Pulse:  [72-90] 88  Resp:  [18-20] 18  SpO2:  [96 %-100 %] 96 %  BP: (115-176)/() 120/56     Weight: 60.6 kg (133 lb 8 oz)  Body mass index is 22.22 kg/m².    Intake/Output Summary (Last 24 hours) at 8/8/2022 1429  Last data filed at 8/8/2022 1200  Gross per 24 hour   Intake 785.79 ml   Output --   Net 785.79 ml      Physical Exam  Vitals and nursing note reviewed.   Constitutional:       Appearance: Normal appearance. She is ill-appearing.   HENT:      Head: Normocephalic and atraumatic.      Nose: Nose normal.      Mouth/Throat:      Mouth: Mucous membranes are moist.   Eyes:      Extraocular Movements: Extraocular movements intact.      Conjunctiva/sclera: Conjunctivae normal.      Pupils: Pupils are equal, round, and reactive to light.   Cardiovascular:      Rate and Rhythm: Normal rate and  regular rhythm.      Pulses: Normal pulses.      Heart sounds: Normal heart sounds. No murmur heard.    No friction rub.   Pulmonary:      Effort: Pulmonary effort is normal. No respiratory distress.      Breath sounds: Rhonchi present.   Abdominal:      General: Abdomen is flat. Bowel sounds are normal.      Palpations: Abdomen is soft.   Genitourinary:     Comments: deferred  Musculoskeletal:         General: No swelling or tenderness. Normal range of motion.      Cervical back: Normal range of motion. No rigidity or tenderness.   Skin:     General: Skin is warm.      Capillary Refill: Capillary refill takes 2 to 3 seconds.      Coloration: Skin is not jaundiced.   Neurological:      General: No focal deficit present.      Mental Status: She is alert and oriented to person, place, and time. Mental status is at baseline.      Cranial Nerves: No cranial nerve deficit.      Sensory: No sensory deficit.      Motor: No weakness.   Psychiatric:         Mood and Affect: Mood normal.       Significant Labs: All pertinent labs within the past 24 hours have been reviewed.  Recent Lab Results  (Last 5 results in the past 24 hours)        08/08/22  1250   08/08/22  0900   08/08/22  0523   08/08/22  0511   08/08/22  0340        Albumin       2.2         Alkaline Phosphatase       60         ALT       9         Anion Gap       9         aPTT   58.9  Comment: aPTT therapeutic range = 39-69 seconds  Results confirmed, test repeated               AST       34         Baso #         0.02       Basophil %         0.4       BILIRUBIN TOTAL       0.5  Comment: For infants and newborns, interpretation of results should be based  on gestational age, weight and in agreement with clinical  observations.    Premature Infant recommended reference ranges:  Up to 24 hours.............<8.0 mg/dL  Up to 48 hours............<12.0 mg/dL  3-5 days..................<15.0 mg/dL  6-29 days.................<15.0 mg/dL           BUN       35          Calcium       7.2         Chloride       108         CO2       18         Creatinine       4.3         Differential Method         Automated       eGFR       10         Eos #         0.0       Eosinophil %         0.4       Ferritin       2,997         Glucose       158         Gran # (ANC)         4.3       Gran %         78.9       Hematocrit         23.1       Hemoglobin         7.6       Immature Grans (Abs)         0.03  Comment: Mild elevation in immature granulocytes is non specific and   can be seen in a variety of conditions including stress response,   acute inflammation, trauma and pregnancy. Correlation with other   laboratory and clinical findings is essential.         Immature Granulocytes         0.6       Lymph #         0.7       Lymph %         12.5       MCH         30.6       MCHC         32.9       MCV         93       Mono #         0.4       Mono %         7.2       MPV         11.1       nRBC         0       Platelets         244       POCT Glucose 128     184           Potassium       5.0         PROTEIN TOTAL       5.3         RBC         2.48       RDW         17.2       Sed Rate         3       Sodium       135         Troponin I   0.206  Comment: The reference interval for Troponin I represents the 99th percentile   cutoff   for our facility and is consistent with 3rd generation assay   performance.       0.228  Comment: The reference interval for Troponin I represents the 99th percentile   cutoff   for our facility and is consistent with 3rd generation assay   performance.           WBC         5.45                              Significant Imaging: I have reviewed all pertinent imaging results/findings within the past 24 hours.      Assessment/Plan:      * COVID  Patient is identified as Severe COVID-19 based on hypoxemia with O2 saturations <94% on room air or on ambulation   Initiate standard COVID protocols; COVID-19 testing ,Infection Control notification  and isolation-  respiratory, contact and droplet per protocol    Diagnostics: (leukopenia, hyponatremia, hyperferritinemia, elevated troponin, elevated d-dimer, age, and comorbidities are significant predictors of poor clinical outcome)  CBC, CMP, Procalcitonin, Ferritin, CRP, LDH and Portable CXR    Management: Initiate targeted therapy with Remdesivir, 200mg IV x1, followed by 100mg IV daily x5 days total, Dexamethasone PO/IV 6mg daily x10 days and Anticoagulation, Patient admitted to non-critical care unit- Will initiate full dose anticoagulation with Unfractionated heparin, Inhaled bronchodilators as needed for shortness of breath. and Continuous cardiac monitoring.     --IV remdesevir  --PO dexamethasone  --supplemental oxygen    S/P CABG (coronary artery bypass graft)  Cont BB, asa , plavix  Cardiology consulted       Elevated troponin  --elevated but flat - 2/2 demand ischemia  --cardiology following  --continue ASA, plavix, BB, statin      CAD (coronary artery disease)  Cardiology consulted  EKG no new finding   Cont BB, statin and plavix  Cont Heparin drip  Trend troponin   08/08:  --0235>>0.251>>0.228>>0.206  --likely demand ischemia      Anemia in chronic kidney disease, on chronic dialysis  Stable  Monitor   08/08:  --HD today  --nephrology following    History of CVA (cerebrovascular accident)    Resume asa , and plavix  Stable     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, with long-term current use of insulin  Patient's FSGs are controlled on current medication regimen.  Last A1c reviewed-   Lab Results   Component Value Date    HGBA1C 9.3 (H) 07/12/2022     Most recent fingerstick glucose reviewed-   Recent Labs   Lab 08/07/22  1640 08/07/22  2016 08/08/22  0523 08/08/22  1250   POCTGLUCOSE 142* 200* 184* 128*     Current correctional scale  Low  Maintain anti-hyperglycemic dose as follows-   Antihyperglycemics (From admission, onward)            Start     Stop Route Frequency  Ordered    08/07/22 1637  insulin aspart U-100 pen 0-5 Units         -- SubQ Before meals & nightly PRN 08/07/22 1537        Hold Oral hypoglycemics while patient is in the hospital.  08/08:  --stable in hospital  --HA1C 9.3  --diabetes educator consulted    ESRD (end stage renal disease)  HD per nephrology   08/08:  --HD today    Hypertension  --cardiac diet  --continue home metoprolol      VTE Risk Mitigation (From admission, onward)         Ordered     IP VTE HIGH RISK PATIENT  Once         08/07/22 1549     Place sequential compression device  Until discontinued         08/07/22 1549     heparin 25,000 units in dextrose 5% (100 units/ml) IV bolus from bag - ADDITIONAL PRN BOLUS - 60 units/kg (max bolus 4000 units)  As needed (PRN)        Question:  Heparin Infusion Adjustment (DO NOT MODIFY ANSWER)  Answer:  \[email or web address]\Images\Pharmacy\HeparinInfusions\heparin LOW INTENSITY nomogram for [facility] [id number]    08/07/22 0951     heparin 25,000 units in dextrose 5% (100 units/ml) IV bolus from bag - ADDITIONAL PRN BOLUS - 30 units/kg (max bolus 4000 units)  As needed (PRN)        Question:  Heparin Infusion Adjustment (DO NOT MODIFY ANSWER)  Answer:  \[email or web address]\Images\Pharmacy\HeparinInfusions\heparin LOW INTENSITY nomogram for [facility] [id number]    08/07/22 0951     heparin 25,000 units in dextrose 5% 250 mL (100 units/mL) infusion LOW INTENSITY nomogram - [facility]  Continuous        Question Answer Comment   Heparin Infusion Adjustment (DO NOT MODIFY ANSWER) \\[email or web address]\Images\Pharmacy\HeparinInfusions\heparin LOW INTENSITY nomogram for [facility] [id number]    Begin at (in units/kg/hr) 12        08/07/22 0951                Discharge Planning   MARIBELL:      Code Status: Full Code   Is the patient medically ready for discharge?:     Reason for patient still in hospital (select all that apply): Patient trending condition and Treatment  Discharge Plan A: Home Health                  Yvonne Manriquez,  FNP-C  Department of Hospital Medicine   '[place] - Telemetry ([facility])

## 2022-08-08 NOTE — SUBJECTIVE & OBJECTIVE
Interval History: Patient with elevated temp overnight - she is requiring 2L oxygen via NC to maintain oxygen sats. Continue PO dexamethasone & IV remdesivir. Getting HD at present. Monitor H/H - getting epo with HD - repeat CBC in AM.    Review of Systems   Constitutional:  Positive for activity change and fatigue.   HENT:  Positive for congestion. Negative for rhinorrhea and trouble swallowing.    Eyes:  Negative for pain and itching.   Respiratory:  Positive for cough, chest tightness and shortness of breath.    Cardiovascular:  Negative for chest pain, palpitations and leg swelling.   Gastrointestinal:  Negative for abdominal distention, abdominal pain, constipation, diarrhea, nausea and vomiting.   Endocrine: Negative for polydipsia and polyphagia.   Genitourinary:  Negative for difficulty urinating and flank pain.   Musculoskeletal:  Negative for arthralgias and gait problem.   Skin:  Negative for color change and wound.   Allergic/Immunologic: Negative.    Neurological:  Positive for weakness. Negative for dizziness and headaches.   Hematological: Negative.    Psychiatric/Behavioral:  Negative for agitation, behavioral problems and confusion. The patient is not nervous/anxious.    Objective:     Vital Signs (Most Recent):  Temp: 99 °F (37.2 °C) (08/08/22 0849)  Pulse: 88 (08/08/22 1300)  Resp: 18 (08/08/22 0849)  BP: (!) 120/56 (08/08/22 0849)  SpO2: 96 % (08/08/22 1300)   Vital Signs (24h Range):  Temp:  [97.7 °F (36.5 °C)-101.6 °F (38.7 °C)] 99 °F (37.2 °C)  Pulse:  [72-90] 88  Resp:  [18-20] 18  SpO2:  [96 %-100 %] 96 %  BP: (115-176)/() 120/56     Weight: 60.6 kg (133 lb 8 oz)  Body mass index is 22.22 kg/m².    Intake/Output Summary (Last 24 hours) at 8/8/2022 1429  Last data filed at 8/8/2022 1200  Gross per 24 hour   Intake 785.79 ml   Output --   Net 785.79 ml      Physical Exam  Vitals and nursing note reviewed.   Constitutional:       Appearance: Normal appearance. She is ill-appearing.    HENT:      Head: Normocephalic and atraumatic.      Nose: Nose normal.      Mouth/Throat:      Mouth: Mucous membranes are moist.   Eyes:      Extraocular Movements: Extraocular movements intact.      Conjunctiva/sclera: Conjunctivae normal.      Pupils: Pupils are equal, round, and reactive to light.   Cardiovascular:      Rate and Rhythm: Normal rate and regular rhythm.      Pulses: Normal pulses.      Heart sounds: Normal heart sounds. No murmur heard.    No friction rub.   Pulmonary:      Effort: Pulmonary effort is normal. No respiratory distress.      Breath sounds: Rhonchi present.   Abdominal:      General: Abdomen is flat. Bowel sounds are normal.      Palpations: Abdomen is soft.   Genitourinary:     Comments: deferred  Musculoskeletal:         General: No swelling or tenderness. Normal range of motion.      Cervical back: Normal range of motion. No rigidity or tenderness.   Skin:     General: Skin is warm.      Capillary Refill: Capillary refill takes 2 to 3 seconds.      Coloration: Skin is not jaundiced.   Neurological:      General: No focal deficit present.      Mental Status: She is alert and oriented to person, place, and time. Mental status is at baseline.      Cranial Nerves: No cranial nerve deficit.      Sensory: No sensory deficit.      Motor: No weakness.   Psychiatric:         Mood and Affect: Mood normal.       Significant Labs: All pertinent labs within the past 24 hours have been reviewed.  Recent Lab Results  (Last 5 results in the past 24 hours)        08/08/22  1250   08/08/22  0900   08/08/22  0523   08/08/22  0511   08/08/22  0340        Albumin       2.2         Alkaline Phosphatase       60         ALT       9         Anion Gap       9         aPTT   58.9  Comment: aPTT therapeutic range = 39-69 seconds  Results confirmed, test repeated               AST       34         Baso #         0.02       Basophil %         0.4       BILIRUBIN TOTAL       0.5  Comment: For infants and  newborns, interpretation of results should be based  on gestational age, weight and in agreement with clinical  observations.    Premature Infant recommended reference ranges:  Up to 24 hours.............<8.0 mg/dL  Up to 48 hours............<12.0 mg/dL  3-5 days..................<15.0 mg/dL  6-29 days.................<15.0 mg/dL           BUN       35         Calcium       7.2         Chloride       108         CO2       18         Creatinine       4.3         Differential Method         Automated       eGFR       10         Eos #         0.0       Eosinophil %         0.4       Ferritin       2,997         Glucose       158         Gran # (ANC)         4.3       Gran %         78.9       Hematocrit         23.1       Hemoglobin         7.6       Immature Grans (Abs)         0.03  Comment: Mild elevation in immature granulocytes is non specific and   can be seen in a variety of conditions including stress response,   acute inflammation, trauma and pregnancy. Correlation with other   laboratory and clinical findings is essential.         Immature Granulocytes         0.6       Lymph #         0.7       Lymph %         12.5       MCH         30.6       MCHC         32.9       MCV         93       Mono #         0.4       Mono %         7.2       MPV         11.1       nRBC         0       Platelets         244       POCT Glucose 128     184           Potassium       5.0         PROTEIN TOTAL       5.3         RBC         2.48       RDW         17.2       Sed Rate         3       Sodium       135         Troponin I   0.206  Comment: The reference interval for Troponin I represents the 99th percentile   cutoff   for our facility and is consistent with 3rd generation assay   performance.       0.228  Comment: The reference interval for Troponin I represents the 99th percentile   cutoff   for our facility and is consistent with 3rd generation assay   performance.           WBC         5.45                               Significant Imaging: I have reviewed all pertinent imaging results/findings within the past 24 hours.

## 2022-08-09 ENCOUNTER — TELEPHONE (OUTPATIENT)
Dept: CARDIOLOGY | Facility: HOSPITAL | Age: 77
End: 2022-08-09
Payer: MEDICARE

## 2022-08-09 VITALS
WEIGHT: 132.5 LBS | BODY MASS INDEX: 22.05 KG/M2 | DIASTOLIC BLOOD PRESSURE: 53 MMHG | OXYGEN SATURATION: 93 % | SYSTOLIC BLOOD PRESSURE: 109 MMHG | HEART RATE: 72 BPM | RESPIRATION RATE: 18 BRPM | TEMPERATURE: 98 F

## 2022-08-09 LAB
ALBUMIN SERPL BCP-MCNC: 2.4 G/DL (ref 3.5–5.2)
ALP SERPL-CCNC: 77 U/L (ref 55–135)
ALT SERPL W/O P-5'-P-CCNC: 9 U/L (ref 10–44)
ANION GAP SERPL CALC-SCNC: 9 MMOL/L (ref 8–16)
ANISOCYTOSIS BLD QL SMEAR: SLIGHT
APTT BLDCRRT: 53.6 SEC (ref 21–32)
AST SERPL-CCNC: 16 U/L (ref 10–40)
BASOPHILS # BLD AUTO: 0.02 K/UL (ref 0–0.2)
BASOPHILS NFR BLD: 0.3 % (ref 0–1.9)
BILIRUB SERPL-MCNC: 1.1 MG/DL (ref 0.1–1)
BUN SERPL-MCNC: 41 MG/DL (ref 8–23)
BURR CELLS BLD QL SMEAR: ABNORMAL
CALCIUM SERPL-MCNC: 8.2 MG/DL (ref 8.7–10.5)
CHLORIDE SERPL-SCNC: 94 MMOL/L (ref 95–110)
CO2 SERPL-SCNC: 23 MMOL/L (ref 23–29)
CREAT SERPL-MCNC: 4.5 MG/DL (ref 0.5–1.4)
DIFFERENTIAL METHOD: ABNORMAL
EOSINOPHIL # BLD AUTO: 0 K/UL (ref 0–0.5)
EOSINOPHIL NFR BLD: 0.4 % (ref 0–8)
ERYTHROCYTE [DISTWIDTH] IN BLOOD BY AUTOMATED COUNT: 16.5 % (ref 11.5–14.5)
EST. GFR  (NO RACE VARIABLE): 10 ML/MIN/1.73 M^2
GLUCOSE SERPL-MCNC: 255 MG/DL (ref 70–110)
HCT VFR BLD AUTO: 27.1 % (ref 37–48.5)
HGB BLD-MCNC: 8.8 G/DL (ref 12–16)
IMM GRANULOCYTES # BLD AUTO: 0.03 K/UL (ref 0–0.04)
IMM GRANULOCYTES NFR BLD AUTO: 0.4 % (ref 0–0.5)
LYMPHOCYTES # BLD AUTO: 0.7 K/UL (ref 1–4.8)
LYMPHOCYTES NFR BLD: 9.8 % (ref 18–48)
MAGNESIUM SERPL-MCNC: 2 MG/DL (ref 1.6–2.6)
MCH RBC QN AUTO: 32.6 PG (ref 27–31)
MCHC RBC AUTO-ENTMCNC: 32.5 G/DL (ref 32–36)
MCV RBC AUTO: 100 FL (ref 82–98)
MONOCYTES # BLD AUTO: 0.3 K/UL (ref 0.3–1)
MONOCYTES NFR BLD: 4.2 % (ref 4–15)
NEUTROPHILS # BLD AUTO: 5.7 K/UL (ref 1.8–7.7)
NEUTROPHILS NFR BLD: 84.9 % (ref 38–73)
NRBC BLD-RTO: 0 /100 WBC
OVALOCYTES BLD QL SMEAR: ABNORMAL
PHOSPHATE SERPL-MCNC: 4.8 MG/DL (ref 2.7–4.5)
PLATELET # BLD AUTO: 296 K/UL (ref 150–450)
PLATELET BLD QL SMEAR: ABNORMAL
PMV BLD AUTO: 9.2 FL (ref 9.2–12.9)
POCT GLUCOSE: 242 MG/DL (ref 70–110)
POCT GLUCOSE: 283 MG/DL (ref 70–110)
POCT GLUCOSE: 285 MG/DL (ref 70–110)
POIKILOCYTOSIS BLD QL SMEAR: SLIGHT
POTASSIUM SERPL-SCNC: 4.1 MMOL/L (ref 3.5–5.1)
PROT SERPL-MCNC: 5.2 G/DL (ref 6–8.4)
RBC # BLD AUTO: 2.7 M/UL (ref 4–5.4)
SODIUM SERPL-SCNC: 126 MMOL/L (ref 136–145)
WBC # BLD AUTO: 6.74 K/UL (ref 3.9–12.7)

## 2022-08-09 PROCEDURE — 36415 COLL VENOUS BLD VENIPUNCTURE: CPT | Performed by: INTERNAL MEDICINE

## 2022-08-09 PROCEDURE — 85730 THROMBOPLASTIN TIME PARTIAL: CPT | Performed by: INTERNAL MEDICINE

## 2022-08-09 PROCEDURE — G0378 HOSPITAL OBSERVATION PER HR: HCPCS

## 2022-08-09 PROCEDURE — 25000003 PHARM REV CODE 250: Performed by: NURSE PRACTITIONER

## 2022-08-09 PROCEDURE — 99214 PR OFFICE/OUTPT VISIT, EST, LEVL IV, 30-39 MIN: ICD-10-PCS | Mod: CR,,, | Performed by: INTERNAL MEDICINE

## 2022-08-09 PROCEDURE — 94761 N-INVAS EAR/PLS OXIMETRY MLT: CPT

## 2022-08-09 PROCEDURE — 25000003 PHARM REV CODE 250: Performed by: INTERNAL MEDICINE

## 2022-08-09 PROCEDURE — 63600175 PHARM REV CODE 636 W HCPCS: Mod: TB | Performed by: INTERNAL MEDICINE

## 2022-08-09 PROCEDURE — 96372 THER/PROPH/DIAG INJ SC/IM: CPT | Performed by: INTERNAL MEDICINE

## 2022-08-09 PROCEDURE — 85025 COMPLETE CBC W/AUTO DIFF WBC: CPT | Performed by: NURSE PRACTITIONER

## 2022-08-09 PROCEDURE — 36415 COLL VENOUS BLD VENIPUNCTURE: CPT | Performed by: NURSE PRACTITIONER

## 2022-08-09 PROCEDURE — 84100 ASSAY OF PHOSPHORUS: CPT | Performed by: NURSE PRACTITIONER

## 2022-08-09 PROCEDURE — 80053 COMPREHEN METABOLIC PANEL: CPT | Performed by: INTERNAL MEDICINE

## 2022-08-09 PROCEDURE — 99214 OFFICE O/P EST MOD 30 MIN: CPT | Mod: CR,,, | Performed by: INTERNAL MEDICINE

## 2022-08-09 PROCEDURE — 96366 THER/PROPH/DIAG IV INF ADDON: CPT

## 2022-08-09 PROCEDURE — 27000221 HC OXYGEN, UP TO 24 HOURS

## 2022-08-09 PROCEDURE — 83735 ASSAY OF MAGNESIUM: CPT | Performed by: NURSE PRACTITIONER

## 2022-08-09 RX ORDER — FAMOTIDINE 20 MG/1
20 TABLET, FILM COATED ORAL DAILY
Qty: 30 TABLET | Refills: 0 | Status: SHIPPED | OUTPATIENT
Start: 2022-08-09 | End: 2022-10-04

## 2022-08-09 RX ORDER — TALC
6 POWDER (GRAM) TOPICAL NIGHTLY PRN
Qty: 30 TABLET | Refills: 0 | Status: SHIPPED | OUTPATIENT
Start: 2022-08-09 | End: 2022-08-25

## 2022-08-09 RX ORDER — DEXAMETHASONE 6 MG/1
6 TABLET ORAL DAILY
Qty: 8 TABLET | Refills: 0 | Status: SHIPPED | OUTPATIENT
Start: 2022-08-10 | End: 2022-08-18

## 2022-08-09 RX ORDER — BENZONATATE 100 MG/1
100 CAPSULE ORAL 3 TIMES DAILY PRN
Qty: 30 CAPSULE | Refills: 0 | Status: SHIPPED | OUTPATIENT
Start: 2022-08-09 | End: 2022-08-19

## 2022-08-09 RX ORDER — ASCORBIC ACID 500 MG
500 TABLET ORAL DAILY
Qty: 30 TABLET | Refills: 0 | Status: SHIPPED | OUTPATIENT
Start: 2022-08-09 | End: 2022-09-08

## 2022-08-09 RX ADMIN — BENZONATATE 100 MG: 100 CAPSULE ORAL at 09:08

## 2022-08-09 RX ADMIN — GUAIFENESIN AND DEXTROMETHORPHAN 5 ML: 100; 10 SYRUP ORAL at 11:08

## 2022-08-09 RX ADMIN — METOPROLOL TARTRATE 50 MG: 50 TABLET, FILM COATED ORAL at 09:08

## 2022-08-09 RX ADMIN — CLOPIDOGREL 75 MG: 75 TABLET, FILM COATED ORAL at 09:08

## 2022-08-09 RX ADMIN — REMDESIVIR 100 MG: 100 INJECTION, POWDER, LYOPHILIZED, FOR SOLUTION INTRAVENOUS at 09:08

## 2022-08-09 RX ADMIN — ASPIRIN 81 MG: 81 TABLET, COATED ORAL at 09:08

## 2022-08-09 RX ADMIN — INSULIN ASPART 3 UNITS: 100 INJECTION, SOLUTION INTRAVENOUS; SUBCUTANEOUS at 02:08

## 2022-08-09 RX ADMIN — DEXAMETHASONE 6 MG: 4 TABLET ORAL at 09:08

## 2022-08-09 RX ADMIN — GUAIFENESIN AND DEXTROMETHORPHAN 5 ML: 100; 10 SYRUP ORAL at 05:08

## 2022-08-09 NOTE — CONSULTS
[facility] - Tele[facility] ([facility])  Nephrology  Consult Note    Patient Name: Avril Monzon  MRN: [id number]  Admission Date: 8/7/2022  Hospital Length of Stay: 0 days  Attending Provider: Suhas Prakash, *   Primary Care Physician: Rose Parks MD  Principal Problem:COVID    Consults  Subjective:     HPI:     77-year-old female well known to me from previous admissions.  Recently diagnosed with COVID-19 pneumonia.  Has history of end-stage renal disease.  Admitted with shortness of breath.  Patient was seen in her hospital room on dialysis.  In bed resting comfortably.  States that her breathing has improved since admission.    08/09/2022:  Patient was seen in her hospital room.  In bed resting comfortably.  No acute distress noted.  States her breathing has improved since admission.  Underwent dialysis yesterday without difficulty    Past Medical History:   Diagnosis Date    Acute hypoxemic respiratory failure 11/26/2018    Anemia     Arthritis     back, hand, knees    Back pain     Cataract     CKD stage G4/A3, GFR 15 - 29 and albumin creatinine ratio >300 mg/g     GFR 40% Jan 2014 and 33% ub 3/2014 ([facility])    Coronary artery disease involving native coronary artery of native heart 11/30/2018    Diabetic retinopathy     DM (diabetes mellitus) type II controlled, neurological manifestation     Exudative age-related macular degeneration of left eye with active choroidal neovascularization 2/5/2019    GERD (gastroesophageal reflux disease)     Glaucoma     Heart failure     Hyperlipidemia     Hypertension     Non-ST elevation MI (NSTEMI) 11/28/2018    NSTEMI (non-ST elevated myocardial infarction) 11/27/2018    Peripheral neuropathy     Polyneuropathy     Proteinuria     >6 mo     Seizures     [facility] 1/2014 -dick CT NRI showed small vessel    Stroke 2012,2014    Tobacco dependence     Type 2 diabetes with peripheral circulatory disorder, controlled        Past Surgical History:   Procedure  Laterality Date    BREAST LUMPECTOMY Left     benign, when patient was 15 years old    BREAST MASS EXCISION      ,benign, age 15.    CATHETERIZATION OF BOTH LEFT AND RIGHT HEART N/A 11/28/2018    Procedure: CATHETERIZATION, HEART, BOTH LEFT AND RIGHT;  Surgeon: Michelle Holman MD;  Location: [facility] CATH LAB;  Service: Cardiology;  Laterality: N/A;    CATHETERIZATION OF BOTH LEFT AND RIGHT HEART N/A 11/30/2018    Procedure: CATHETERIZATION, HEART, BOTH LEFT AND RIGHT;  Surgeon: Michelle Holman MD;  Location: [facility] CATH LAB;  Service: Cardiology;  Laterality: N/A;    CORONARY ARTERY BYPASS GRAFT (CABG) N/A 7/20/2022    Procedure: CORONARY ARTERY BYPASS GRAFT (CABG);  Surgeon: Sanju Locke MD;  Location: [facility] OR;  Service: Cardiothoracic;  Laterality: N/A;  2-VESSEL PUMP ASSIST WITH EPI-AORTIC ULTRASOUND    ENDOSCOPIC HARVEST OF VEIN Left 7/20/2022    Procedure: SURGICAL PROCUREMENT, VEIN, ENDOSCOPIC;  Surgeon: Sanju Locke MD;  Location: [facility];  Service: Cardiothoracic;  Laterality: Left;    HYSTERECTOMY  1982    INJECTION OF ANESTHETIC AGENT AROUND MULTIPLE INTERCOSTAL NERVES N/A 7/20/2022    Procedure: BLOCK, NERVE, INTERCOSTAL, 2 OR MORE;  Surgeon: Sanju Locke MD;  Location: [facility] OR;  Service: Cardiothoracic;  Laterality: N/A;  PARASTERNAL NERVE BLOCK    INSERTION OF SHUNT      LEFT HEART CATHETERIZATION Left 12/3/2018    Procedure: CATHETERIZATION, HEART, LEFT;  Surgeon: Michelle Holman MD;  Location: [facility] CATH LAB;  Service: Cardiology;  Laterality: Left;  LAD ATHERECTOMY STENT/ FEMORAL APPROACH    LEFT HEART CATHETERIZATION Left 7/13/2022    Procedure: CATHETERIZATION, HEART, LEFT;  Surgeon: Abhi Sloan MD;  Location: [facility] CATH LAB;  Service: Cardiology;  Laterality: Left;    OOPHORECTOMY      wrist cyst Right 1980s    dorsal wrist cyst       Review of patient's allergies indicates:   Allergen Reactions    Codeine Swelling    Darvon [propoxyphene] Swelling    Atorvastatin       Muscle twitching     Current Facility-Administered Medications   Medication Frequency    acetaminophen tablet 650 mg Q8H PRN    aspirin EC tablet 81 mg Daily    benzonatate capsule 100 mg TID PRN    clopidogreL tablet 75 mg Daily    dexAMETHasone tablet 6 mg Daily    dextromethorphan-guaiFENesin  mg/5 ml liquid 5 mL Q6H    dextrose 10% bolus 125 mL PRN    dextrose 10% bolus 250 mL PRN    glucagon (human recombinant) injection 1 mg PRN    glucose chewable tablet 16 g PRN    glucose chewable tablet 24 g PRN    heparin 25,000 units in dextrose 5% (100 units/ml) IV bolus from bag - ADDITIONAL PRN BOLUS - 30 units/kg (max bolus 4000 units) PRN    heparin 25,000 units in dextrose 5% (100 units/ml) IV bolus from bag - ADDITIONAL PRN BOLUS - 60 units/kg (max bolus 4000 units) PRN    heparin 25,000 units in dextrose 5% 250 mL (100 units/mL) infusion LOW INTENSITY nomogram - [facility] Continuous    insulin aspart U-100 pen 0-5 Units QID (AC + HS) PRN    metoprolol tartrate (LOPRESSOR) tablet 50 mg BID    ondansetron injection 4 mg Q6H PRN    remdesivir 100 mg in sodium chloride 0.9% 250 mL infusion Daily    sodium chloride 0.9% bolus 250 mL PRN    sodium chloride 0.9% flush 10 mL PRN     Family History     Problem Relation (Age of Onset)    Cancer Maternal Grandmother    Diabetes Mother    Heart disease Mother    Hyperlipidemia Mother    Hypertension Mother    Muscular dystrophy Son        Tobacco Use    Smoking status: Former Smoker     Packs/day: 1.50     Years: 30.00     Pack years: 45.00     Types: Cigarettes     Quit date: 1990     Years since quittin.6    Smokeless tobacco: Former User   Substance and Sexual Activity    Alcohol use: No     Alcohol/week: 0.0 standard drinks    Drug use: No    Sexual activity: Not Currently     Review of Systems   Constitutional: Negative.    HENT: Negative.    Eyes: Negative.    Respiratory: Positive for shortness of breath.    Cardiovascular:  Negative.    Gastrointestinal: Negative.    Genitourinary: Negative.    Musculoskeletal: Negative.    Skin: Negative.    Neurological: Negative.      Objective:     Vital Signs (Most Recent):  Temp: 98 °F (36.7 °C) (08/09/22 0802)  Pulse: 69 (08/09/22 0802)  Resp: 18 (08/09/22 0802)  BP: 135/65 (08/09/22 0802)  SpO2: 100 % (08/09/22 0802)  O2 Device (Oxygen Therapy): nasal cannula (08/08/22 2018) Vital Signs (24h Range):  Temp:  [98 °F (36.7 °C)-98.8 °F (37.1 °C)] 98 °F (36.7 °C)  Pulse:  [69-88] 69  Resp:  [16-18] 18  SpO2:  [96 %-100 %] 100 %  BP: (117-142)/(51-86) 135/65     Weight: 60.1 kg (132 lb 7.9 oz) (08/09/22 0030)  Body mass index is 22.05 kg/m².  Body surface area is 1.66 meters squared.    I/O last 3 completed shifts:  In: 468.9 [P.O.:360; I.V.:108.9]  Out: 1500 [Other:1500]    Physical Exam  Constitutional:       Appearance: Normal appearance.   HENT:      Head: Normocephalic and atraumatic.   Eyes:      General: No scleral icterus.     Extraocular Movements: Extraocular movements intact.      Pupils: Pupils are equal, round, and reactive to light.   Cardiovascular:      Rate and Rhythm: Normal rate and regular rhythm.   Pulmonary:      Effort: Pulmonary effort is normal.      Breath sounds: No stridor.   Musculoskeletal:      Right lower leg: No edema.      Left lower leg: No edema.   Skin:     General: Skin is warm and dry.   Neurological:      General: No focal deficit present.      Mental Status: She is alert and oriented to person, place, and time.   Psychiatric:         Mood and Affect: Mood normal.         Behavior: Behavior normal.         Significant Labs:  BMP:   Recent Labs   Lab 08/08/22  0511   *   *   K 5.0      CO2 18*   BUN 35*   CREATININE 4.3*   CALCIUM 7.2*     CMP:   Recent Labs   Lab 08/08/22  0511   *   CALCIUM 7.2*   ALBUMIN 2.2*   PROT 5.3*   *   K 5.0   CO2 18*      BUN 35*   CREATININE 4.3*   ALKPHOS 60   ALT 9*   AST 34   BILITOT 0.5      All labs within the past 24 hours have been reviewed.    Significant Imaging:  Labs: Reviewed  Reviewed    Assessment/Plan:     Active Diagnoses:    Diagnosis Date Noted POA    PRINCIPAL PROBLEM:  COVID [U07.1] 08/03/2022 Yes    S/P CABG (coronary artery bypass graft) [Z95.1] 07/20/2022 Not Applicable    NSTEMI (non-ST elevated myocardial infarction) [I21.4] 07/12/2022 Yes    Chronic diastolic heart failure [I50.32] 12/20/2021 Yes    Elevated troponin [R77.8] 12/06/2021 Unknown    CAD (coronary artery disease) [I25.10] 11/30/2018 Yes    Anemia in chronic kidney disease, on chronic dialysis [N18.6, D63.1, Z99.2] 05/18/2017 Not Applicable    History of CVA (cerebrovascular accident) [Z86.73] 05/16/2017 Not Applicable    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, with long-term current use of insulin [E11.3553, Z79.4] 05/02/2016 Not Applicable    ESRD (end stage renal disease) [N18.6]  Yes    Hypertension [I10]  Yes      Problems Resolved During this Admission:       1. End-stage renal disease:  Had dialysis yesterday, uneventful.  Will plan to continue Monday Wednesday Friday schedule unless otherwise indicated..    2. Potassium is stable.    3. Bicarbonate stable.    4. COVID-19:  Defer to primary service for management.  Patient states that breathing and cough have improved since admission.    Thank you for your consult.     Adrian Ramirez MD  Nephrology  [facility] - Telemetry ([facility])

## 2022-08-09 NOTE — PLAN OF CARE
[facility] - Telemetry (Hospital)  Discharge Final Note    Primary Care Provider: Rose Parks MD    Expected Discharge Date: 8/9/2022    Final Discharge Note (most recent)     Final Note - 08/09/22 1602        Final Note    Assessment Type Final Discharge Note     Anticipated Discharge Disposition Home-Health Care [facility]                 Important Message from Medicare             DC dispo: home with continued [facility]  Patient has f/u appt on 8/11/22

## 2022-08-09 NOTE — DISCHARGE SUMMARY
[facility] - Tele[facility] ([facility])  [facility] Medicine  Discharge Summary      Patient Name: Avril Monzon  MRN: [id number]  Patient Class: OP- Observation  Admission Date: 8/7/2022  Hospital Length of Stay: 0 days  Discharge Date and Time:  08/09/2022 5:34 PM  Attending Physician: Suhas Prakash, *   Discharging Provider: Yvonne Manriquez, ARLETHP-C  Primary Care Provider: Rose Parks MD      HPI:   77 y/o female with a pmh of ESRD on HD, DM2, hyperlipidemia,CAD s/p CABG  , Tobacco abuse , HTN   and h/o CVA presented to the ER wth a C/O Cough for the last 7 days days  which has gotten worse . She was Dx with COVID-19 infection on 8/3/22 an d/c home from the ER . She is s/p MAB infusion on 8/4/22 . She is s/p CABG  on 8/20  and d/c home ( LOS 16 days , D/c on 8/28). She is complaining of dry cough  associated with SOB and chest tightness .  It is also associted with  fatigue and tiredness . She denies any fever , chills , GI or  sx . The chest pain pain is located on the mid sternum and is worsening  with cough .   ER COURSE: CXR show b/l pulmonary congestion , troponin 0.290 , BNP> 4900 . Cardiology consulted and Rec to start heaprin drip .  ER VS:   BP Pulse Resp Temp SpO2   08/07/22 0812 08/07/22 0812 08/07/22 0812 08/07/22 0859 08/07/22 0812   (!) 179/73 92 20 99.6 °F (37.6 °C) 98 %         Pt will be admitted to observation with a Dx of COVID-19 infection , elevated troponin and ESRD  CODE STATUS : FULL CODE      * No surgery found *      Hospital Course:   Patient was kept on OBS for COVID under the care of hospital medicine, she was started on PO dexamethasone and IV remdesivir. Nephrology was consulted for ESRD on HD. 08/08: Patient with elevated temp overnight - she is requiring 2L oxygen via NC to maintain oxygen sats. Continue PO dexamethasone & IV remdesivir. Getting HD at present. Monitor H/H - getting epo with HD - repeat CBC in AM.  08/09:Patient improved overnight - afebrile. Labs stable with  increase in HGB. She is stable to dc home. She will continue PO dexamethasone, tessalon pearls, MVI, vit C, pepcid, and melatonin. Patient high risk for readmission. Referred to [facility] at home program for ongoing management, to increase compliance, and decrease readmission. Patient was seen and examined today and deemed stable for discharge home.       Goals of Care Treatment Preferences:  Code Status: Full Code      Consults:   Consults (From admission, onward)        Status Ordering Provider     Inpatient consult to Diabetes educator  Once        Provider:  (Not yet assigned)    Completed MARSHA CREWS     Inpatient consult to Midline team  Once        Provider:  (Not yet assigned)    Acknowledged ENRIQUETA BERNARDO     Inpatient consult to Nephrology  Once        Provider:  Adrian Ramirez MD    Completed ENRIQUETA BERNARDO     Inpatient consult to Cardiology  Once        Provider:  Jeremie Spicer MD    Completed PATRICIA RODRIGUEZ JR          No new Assessment & Plan notes have been filed under this hospital service since the last note was generated.  Service: Hospital Medicine    Final Active Diagnoses:    Diagnosis Date Noted POA    PRINCIPAL PROBLEM:  COVID [U07.1] 08/03/2022 Yes    S/P CABG (coronary artery bypass graft) [Z95.1] 07/20/2022 Not Applicable    NSTEMI (non-ST elevated myocardial infarction) [I21.4] 07/12/2022 Yes    Chronic diastolic heart failure [I50.32] 12/20/2021 Yes    Elevated troponin [R77.8] 12/06/2021 Unknown    CAD (coronary artery disease) [I25.10] 11/30/2018 Yes    Anemia in chronic kidney disease, on chronic dialysis [N18.6, D63.1, Z99.2] 05/18/2017 Not Applicable    History of CVA (cerebrovascular accident) [Z86.73] 05/16/2017 Not Applicable    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, with long-term current use of insulin [E11.3553, Z79.4] 05/02/2016 Not Applicable    ESRD (end stage renal disease) [N18.6]  Yes     Hypertension [I10]  Yes      Problems Resolved During this Admission:       Discharged Condition: stable    Disposition: Home or Self Care    Follow Up:    Patient Instructions:      Ambulatory referral/consult to [facility]   Standing Status: Future   Referral Priority: Urgent Referral Type: Consultation   Referral Reason: Specialty Services Required   Number of Visits Requested: 1     Diet Cardiac     Diet diabetic     Notify your health care provider if you experience any of the following:  increased confusion or weakness     Notify your health care provider if you experience any of the following:  persistent dizziness, light-headedness, or visual disturbances     Notify your health care provider if you experience any of the following:  worsening rash     Notify your health care provider if you experience any of the following:  severe persistent headache     Notify your health care provider if you experience any of the following:  difficulty breathing or increased cough     Notify your health care provider if you experience any of the following:  redness, tenderness, or signs of infection (pain, swelling, redness, odor or green/yellow discharge around incision site)     Notify your health care provider if you experience any of the following:  severe uncontrolled pain     Notify your health care provider if you experience any of the following:  persistent nausea and vomiting or diarrhea     Notify your health care provider if you experience any of the following:  temperature >100.4     Activity as tolerated       Significant Diagnostic Studies: Labs:   BMP:   Recent Labs   Lab 08/08/22  0511 08/09/22  1107 08/09/22  1239   * 255*  --    * 126*  --    K 5.0 4.1  --     94*  --    CO2 18* 23  --    BUN 35* 41*  --    CREATININE 4.3* 4.5*  --    CALCIUM 7.2* 8.2*  --    MG  --   --  2.0   , CMP   Recent Labs   Lab 08/08/22  0511 08/09/22  1107   * 126*   K 5.0  4.1    94*   CO2 18* 23   * 255*   BUN 35* 41*   CREATININE 4.3* 4.5*   CALCIUM 7.2* 8.2*   PROT 5.3* 5.2*   ALBUMIN 2.2* 2.4*   BILITOT 0.5 1.1*   ALKPHOS 60 77   AST 34 16   ALT 9* 9*   ANIONGAP 9 9   , CBC   Recent Labs   Lab 08/08/22  0340 08/09/22  1239   WBC 5.45 6.74   HGB 7.6* 8.8*   HCT 23.1* 27.1*    296    and All labs within the past 24 hours have been reviewed    Pending Diagnostic Studies:     Procedure Component Value Units Date/Time    CBC auto differential [[id number]]     Order Status: Sent Lab Status: No result     Specimen: Blood     Troponin I [[id number]] Collected: 08/07/22 1610    Order Status: Sent Lab Status: In process Updated: 08/07/22 1610    Specimen: Blood     Narrative:      Collection has been rescheduled by CEW at 08/07/2022 15:40 Reason:   Unable to collect         Medications:  Reconciled Home Medications:      Medication List      START taking these medications    ascorbic acid (vitamin C) 500 MG tablet  Commonly known as: VITAMIN C  Take 1 tablet (500 mg total) by mouth once daily.     benzonatate 100 MG capsule  Commonly known as: TESSALON  Take 1 capsule (100 mg total) by mouth 3 (three) times daily as needed for Cough.     dexAMETHasone 6 MG tablet  Commonly known as: DECADRON  Take 1 tablet (6 mg total) by mouth once daily. for 8 days  Start taking on: August 10, 2022     famotidine 20 MG tablet  Commonly known as: PEPCID  Take 1 tablet (20 mg total) by mouth once daily at 6am.     HIGH POTENCY MULTIVIT (W-IRON) 9 mg iron-400 mcg Tab tablet  Generic drug: multivit-iron-FA-calcium-mins  Take 1 tablet by mouth once daily.     melatonin 3 mg tablet  Commonly known as: MELATIN  Take 2 tablets (6 mg total) by mouth nightly as needed for Insomnia.        CONTINUE taking these medications    ACCU-CHEK ARDEN PLUS METER Misc  Generic drug: blood-glucose meter  USE TO TEST TWICE DAILY     aspirin 81 MG EC tablet  Commonly known as: ECOTRIN  Take 1 tablet (81 mg  total) by mouth once daily.     azelastine 137 mcg (0.1 %) nasal spray  Commonly known as: ASTELIN  SMARTSI Spray(s) Both Nares Twice Daily PRN     blood sugar diagnostic Strp  To check BG 2 times daily, to use with insurance preferred meter     clopidogreL 75 mg tablet  Commonly known as: PLAVIX  Take 1 tablet (75 mg total) by mouth once daily.     cyanocobalamin 1000 MCG tablet  Commonly known as: VITAMIN B-12  Take 100 mcg by mouth once daily.     folic acid 1 MG tablet  Commonly known as: FOLVITE  Take 1 mg by mouth once daily.     insulin NPH/Reg human 100 unit/mL (70-30) Inpn pen  Commonly known as: HumuLIN (70/30)  To do 28 units subq in AM and 8 units subq in PM.  Dose to be done within 15 min before or after meal.     lancets Misc  To check BG 2 times daily, to use with insurance preferred meter     metoprolol tartrate 50 MG tablet  Commonly known as: LOPRESSOR  Take 1 tablet (50 mg total) by mouth 2 (two) times daily.     OZEMPIC 0.25 mg or 0.5 mg(2 mg/1.5 mL) pen injector  Generic drug: semaglutide  Inject 0.25 mg into the skin every 7 days.     sevelamer carbonate 800 mg Tab  Commonly known as: RENVELA  TAKE 2 TABLETS BY MOUTH THREE TIMES DAILY     telmisartan 20 MG Tab  Commonly known as: MICARDIS  Take 1 tablet (20 mg total) by mouth once daily.     traMADoL 50 mg tablet  Commonly known as: ULTRAM  Take 1 tablet (50 mg total) by mouth every 8 (eight) hours as needed for Pain.     triamcinolone acetonide 0.1% 0.1 % cream  Commonly known as: KENALOG  Apply topically 2 (two) times daily.        STOP taking these medications    isosorbide-hydrALAZINE 20-37.5 mg 20-37.5 mg Tab  Commonly known as: BIDIL            Indwelling Lines/Drains at time of discharge:   Lines/Drains/Airways     Drain  Duration                Hemodialysis AV Fistula 20 Left upper arm 859 days                Time spent on the discharge of patient: 60 minutes         JAREN Moffett-C  Department [facility]  Medicine  [facility] - Tele[facility] ([facility])

## 2022-08-09 NOTE — DISCHARGE INSTRUCTIONS
Discharge instructions reviewed with patient and daughter at bedside. All questions answered with understanding. Tele monitor removed along with LAURE midline and PIV. No other needs at this time. Waiting on wheelchair for transport.

## 2022-08-09 NOTE — PLAN OF CARE
Pt AAOx4. No signs of distress. Able to verbalize needs and follow commands. Calm and Cooperative. Denies any pain at this time. Vital signs stable. Sinus Rhythm on tele monitor. On room air.  PIV is patent. Assist x1. Pt remains free of falls. Meds given as prescribed.  POC reviewed with pt and pt verbalized understanding. Pt educated on safety and fall risk. Verbalized understanding. Interventions implemented as appropriate. Pt able to turn self. Encouraged pt  to turn frequently. Resting quietly in bed.Bed low. Side rails up x2, wheels locked, call light within reach.

## 2022-08-09 NOTE — CONSULTS
Consult received   Patient has been seen by this nurse on previous admit  She was diagnosed over 30 years ago  Spoke to her daughter, Elizabeth Monzon ([phone])    She assists with her care since patient's surgery and Covid diagnosis  They check her glucose 2 times daily    Daughter reports glucoses have been less than 170 for the past week.   She has decreased appetite and weight loss since her illnesses  She is prescribed 70/30 insulin 2 times daily, which the daughter has been administering.  Elizabeth reports she does not administer her insulin if her glucose is less than 120 due to fear of hypoglycemia.  She is concerned that with patient sleeping, she will not wake up or daughter will not identify hypoglycemia.  She does not have any PCP recommendations for either decreasing or skipping doses.  Instructed to discuss with her PCP   She uses the vial/syringe method of insulin delivery    Reports proper insulin storage and site selection/rotation  Reviewed info on target glucose values, hyper/hypoglycemia  Discussed improved A1C values (3-8-22  10.8    7-12-22   9.3)  Attached diabetes literature to be provided at discharge

## 2022-08-11 ENCOUNTER — OUTPATIENT CASE MANAGEMENT (OUTPATIENT)
Dept: ADMINISTRATIVE | Facility: OTHER | Age: 77
End: 2022-08-11
Payer: MEDICARE

## 2022-08-11 ENCOUNTER — OFFICE VISIT (OUTPATIENT)
Dept: CARDIOTHORACIC SURGERY | Facility: CLINIC | Age: 77
End: 2022-08-11
Payer: MEDICARE

## 2022-08-11 VITALS
BODY MASS INDEX: 21.99 KG/M2 | WEIGHT: 132 LBS | OXYGEN SATURATION: 99 % | DIASTOLIC BLOOD PRESSURE: 60 MMHG | HEART RATE: 67 BPM | SYSTOLIC BLOOD PRESSURE: 126 MMHG | HEIGHT: 65 IN

## 2022-08-11 DIAGNOSIS — Z95.1 S/P CABG (CORONARY ARTERY BYPASS GRAFT): Primary | ICD-10-CM

## 2022-08-11 PROCEDURE — 3066F PR DOCUMENTATION OF TREATMENT FOR NEPHROPATHY: ICD-10-PCS | Mod: CPTII,S$GLB,, | Performed by: THORACIC SURGERY (CARDIOTHORACIC VASCULAR SURGERY)

## 2022-08-11 PROCEDURE — 99024 PR POST-OP FOLLOW-UP VISIT: ICD-10-PCS | Mod: S$GLB,,, | Performed by: THORACIC SURGERY (CARDIOTHORACIC VASCULAR SURGERY)

## 2022-08-11 PROCEDURE — 99999 PR PBB SHADOW E&M-EST. PATIENT-LVL III: ICD-10-PCS | Mod: PBBFAC,,, | Performed by: THORACIC SURGERY (CARDIOTHORACIC VASCULAR SURGERY)

## 2022-08-11 PROCEDURE — 3288F FALL RISK ASSESSMENT DOCD: CPT | Mod: CPTII,S$GLB,, | Performed by: THORACIC SURGERY (CARDIOTHORACIC VASCULAR SURGERY)

## 2022-08-11 PROCEDURE — 1101F PT FALLS ASSESS-DOCD LE1/YR: CPT | Mod: CPTII,S$GLB,, | Performed by: THORACIC SURGERY (CARDIOTHORACIC VASCULAR SURGERY)

## 2022-08-11 PROCEDURE — 3066F NEPHROPATHY DOC TX: CPT | Mod: CPTII,S$GLB,, | Performed by: THORACIC SURGERY (CARDIOTHORACIC VASCULAR SURGERY)

## 2022-08-11 PROCEDURE — 3074F SYST BP LT 130 MM HG: CPT | Mod: CPTII,S$GLB,, | Performed by: THORACIC SURGERY (CARDIOTHORACIC VASCULAR SURGERY)

## 2022-08-11 PROCEDURE — 4010F ACE/ARB THERAPY RXD/TAKEN: CPT | Mod: CPTII,S$GLB,, | Performed by: THORACIC SURGERY (CARDIOTHORACIC VASCULAR SURGERY)

## 2022-08-11 PROCEDURE — 3046F HEMOGLOBIN A1C LEVEL >9.0%: CPT | Mod: CPTII,S$GLB,, | Performed by: THORACIC SURGERY (CARDIOTHORACIC VASCULAR SURGERY)

## 2022-08-11 PROCEDURE — 3046F PR MOST RECENT HEMOGLOBIN A1C LEVEL > 9.0%: ICD-10-PCS | Mod: CPTII,S$GLB,, | Performed by: THORACIC SURGERY (CARDIOTHORACIC VASCULAR SURGERY)

## 2022-08-11 PROCEDURE — 1159F MED LIST DOCD IN RCRD: CPT | Mod: CPTII,S$GLB,, | Performed by: THORACIC SURGERY (CARDIOTHORACIC VASCULAR SURGERY)

## 2022-08-11 PROCEDURE — 1126F AMNT PAIN NOTED NONE PRSNT: CPT | Mod: CPTII,S$GLB,, | Performed by: THORACIC SURGERY (CARDIOTHORACIC VASCULAR SURGERY)

## 2022-08-11 PROCEDURE — 1111F PR DISCHARGE MEDS RECONCILED W/ CURRENT OUTPATIENT MED LIST: ICD-10-PCS | Mod: CPTII,S$GLB,, | Performed by: THORACIC SURGERY (CARDIOTHORACIC VASCULAR SURGERY)

## 2022-08-11 PROCEDURE — 3078F DIAST BP <80 MM HG: CPT | Mod: CPTII,S$GLB,, | Performed by: THORACIC SURGERY (CARDIOTHORACIC VASCULAR SURGERY)

## 2022-08-11 PROCEDURE — 3078F PR MOST RECENT DIASTOLIC BLOOD PRESSURE < 80 MM HG: ICD-10-PCS | Mod: CPTII,S$GLB,, | Performed by: THORACIC SURGERY (CARDIOTHORACIC VASCULAR SURGERY)

## 2022-08-11 PROCEDURE — 1126F PR PAIN SEVERITY QUANTIFIED, NO PAIN PRESENT: ICD-10-PCS | Mod: CPTII,S$GLB,, | Performed by: THORACIC SURGERY (CARDIOTHORACIC VASCULAR SURGERY)

## 2022-08-11 PROCEDURE — 1111F DSCHRG MED/CURRENT MED MERGE: CPT | Mod: CPTII,S$GLB,, | Performed by: THORACIC SURGERY (CARDIOTHORACIC VASCULAR SURGERY)

## 2022-08-11 PROCEDURE — 3074F PR MOST RECENT SYSTOLIC BLOOD PRESSURE < 130 MM HG: ICD-10-PCS | Mod: CPTII,S$GLB,, | Performed by: THORACIC SURGERY (CARDIOTHORACIC VASCULAR SURGERY)

## 2022-08-11 PROCEDURE — 1101F PR PT FALLS ASSESS DOC 0-1 FALLS W/OUT INJ PAST YR: ICD-10-PCS | Mod: CPTII,S$GLB,, | Performed by: THORACIC SURGERY (CARDIOTHORACIC VASCULAR SURGERY)

## 2022-08-11 PROCEDURE — 99024 POSTOP FOLLOW-UP VISIT: CPT | Mod: S$GLB,,, | Performed by: THORACIC SURGERY (CARDIOTHORACIC VASCULAR SURGERY)

## 2022-08-11 PROCEDURE — 1159F PR MEDICATION LIST DOCUMENTED IN MEDICAL RECORD: ICD-10-PCS | Mod: CPTII,S$GLB,, | Performed by: THORACIC SURGERY (CARDIOTHORACIC VASCULAR SURGERY)

## 2022-08-11 PROCEDURE — 99999 PR PBB SHADOW E&M-EST. PATIENT-LVL III: CPT | Mod: PBBFAC,,, | Performed by: THORACIC SURGERY (CARDIOTHORACIC VASCULAR SURGERY)

## 2022-08-11 PROCEDURE — 4010F PR ACE/ARB THEARPY RXD/TAKEN: ICD-10-PCS | Mod: CPTII,S$GLB,, | Performed by: THORACIC SURGERY (CARDIOTHORACIC VASCULAR SURGERY)

## 2022-08-11 PROCEDURE — 3288F PR FALLS RISK ASSESSMENT DOCUMENTED: ICD-10-PCS | Mod: CPTII,S$GLB,, | Performed by: THORACIC SURGERY (CARDIOTHORACIC VASCULAR SURGERY)

## 2022-08-11 NOTE — PROGRESS NOTES
Outpatient Care Management  Plan of Care Follow Up Visit    Patient: Avril Monzon  MRN: [id number]  Date of Service: 08/11/2022  Completed by: Debbie Lopez RN  Referral Date: 12/20/2021  Program: Case Management (High Risk)    Reason for Visit   Patient presents with    Update Plan Of Care       Brief Summary: Phone contact with Mrs Mackenzie and Mrs Johnson today and reinforced importance of blood sugar monitoring either thru finger sticks or use of Free Style Ppier CGM. Both agreed to work harder at monitoring blood sugar. Both also agreed to referral to the [facility] LCSW to assist with getting connected to [facility] and to follow up contact from this CM in about 1 week.     Patient Summary     Involvement of Care:  Do I have permission to speak with other family members about your care?       Patient Reported Labs & Vitals:  1.  Any Patient Reported Labs & Vitals?     2.  Patient Reported Blood Pressure:     3.  Patient Reported Pulse:     4.  Patient Reported Weight (Kg):     5.  Patient Reported Blood Glucose (mg/dl):       Medical and social history was reviewed with patient and/or caregiver.     Clinical Assessment     Reviewed and provided basic information on available community resources for mental health, transportation, wellness resources, and palliative care programs with patient and/or caregiver.     Complex Care Plan     Care plan was discussed and completed today with input from patient and/or caregiver.    Patient Instructions     Instructions were provided via the [facility] patient resources and are available for the patient to view on the patient portal.    Next Steps: Continue to work on getting Elizabeth trained on use of Free Style Piper and on educating both on the importance of getting her blood sugars to a controlled range. Debbie Lopez RN    Follow up in about 1 week (around 8/18/2022) for address care plan tasks.    Today[facility] Self-Management Care Plan was developed with the  patients/caregivers input and was based on identified barriers from todays assessment.  Goals were written today with the patient/caregiver and the patient has agreed to work towards these goals to improve his/her overall well-being. Patient verbalized understanding of the care plan, goals, and all of today's instructions. Encouraged patient/caregiver to communicate with his/her physician and health care team about health conditions and the treatment plan.  Provided my contact information today and encouraged patient/caregiver to call me with any questions as needed.     no

## 2022-08-11 NOTE — PROGRESS NOTES
"Subjective:      Patient ID: Avril Monzon is a 77 y.o. female.    Chief Complaint: No chief complaint on file.    HPI:  77-year-old female who has a history of coronary artery disease congestive heart failure end-stage renal disease on dialysis had off pump CABG x2 patient also has a calcified aorta with severe pulmonary hypertension is here for the 1st follow-up visit.  The patient recently contracted COVID-19 and she was admitted inpatient was discharged recently she complains of generalized weakness and cough from her COVID patient also is not able to participate in the cardiopulmonary rehab after she had COVID.  The patient is discharged home with home health 2 days ago after she was tested negative for COVID.  It is not have any major complaints no incisional pain.    Review of patient's allergies indicates:   Allergen Reactions    Codeine Swelling    Darvon [propoxyphene] Swelling    Atorvastatin      Muscle twitching       Review of Systems   Constitutional: Positive for activity change, appetite change and fatigue.   HENT: Positive for congestion. Negative for dental problem, nosebleeds and sore throat.    Eyes: Negative for discharge and visual disturbance.   Respiratory: Positive for cough and shortness of breath. Negative for chest tightness and stridor.    Cardiovascular: Positive for leg swelling.   Gastrointestinal: Negative for abdominal distention and abdominal pain.   Genitourinary: Negative for difficulty urinating and dysuria.   Musculoskeletal: Positive for arthralgias, back pain and myalgias. Negative for joint swelling.   Allergic/Immunologic: Negative for environmental allergies.   Neurological: Negative for dizziness, syncope and headaches.   Hematological: Does not bruise/bleed easily.   Psychiatric/Behavioral: Negative for behavioral problems.          Objective:   /60   Pulse 67   Ht 5' 5" (1.651 m)   Wt 59.9 kg (132 lb)   LMP  (LMP Unknown)   SpO2 99%   BMI 21.97 kg/m² "     X-Ray Chest AP Portable  Narrative: EXAMINATION:  XR CHEST AP PORTABLE    CLINICAL HISTORY:  Chest Pain;    COMPARISON:  08/03/2022    FINDINGS:  The heart is enlarged.  The aorta is atherosclerotic.  Bilateral pulmonary vascular congestion with mild interstitial edema.  Small pleural fluid collections.  Impression: Cardiomegaly with interstitial edema and small pleural fluid collections.    Electronically signed by: Zi Gee  Date:    08/07/2022  Time:    09:28         Physical Exam  Vitals and nursing note reviewed.   Constitutional:       Appearance: Normal appearance.   HENT:      Head: Normocephalic and atraumatic.      Mouth/Throat:      Mouth: Mucous membranes are moist.   Eyes:      Extraocular Movements: Extraocular movements intact.      Pupils: Pupils are equal, round, and reactive to light.   Cardiovascular:      Rate and Rhythm: Normal rate and regular rhythm.      Pulses: Normal pulses.      Heart sounds: Normal heart sounds.   Pulmonary:      Effort: Pulmonary effort is normal.      Breath sounds: Normal breath sounds.   Abdominal:      Palpations: Abdomen is soft.   Musculoskeletal:         General: Normal range of motion.      Cervical back: Normal range of motion and neck supple.      Right lower leg: Edema present.      Left lower leg: Edema present.   Skin:     General: Skin is warm.      Capillary Refill: Capillary refill takes less than 2 seconds.   Neurological:      General: No focal deficit present.      Mental Status: She is alert and oriented to person, place, and time.         Assessment:     1. S/P CABG (coronary artery bypass graft)    Status post CABG x2  End-stage renal disease on dialysis Monday Wednesday Friday  Hypertension  Hyperlipidemia  Diabetes mellitus type 2  COVID 19  Pulmonary hypertension      Plan   Continue aspirin Plavix beta-blocker  Dialysis as per the nephrologist  Continue outpatient cardiopulmonary rehabilitation  Incentive spirometry at home  Follow-up  with Cardiology and primary care  Follow-up in 1 month with Cardiothoracic surgery          [facility]  Rhonda Vazquez

## 2022-08-14 ENCOUNTER — NURSE TRIAGE (OUTPATIENT)
Dept: ADMINISTRATIVE | Facility: CLINIC | Age: 77
End: 2022-08-14
Payer: MEDICARE

## 2022-08-14 NOTE — TELEPHONE ENCOUNTER
Blood pressure 156/58 an hour ago. Wanting to know if she should give the metoprolol. Triage nurse request a current blood pressure reading. Caller states blood pressure.Current reading 193/78    Reason for Disposition   Systolic BP  >= 180 OR Diastolic >= 110    Additional Information   Negative: Symptom is main concern  (e.g., headache, chest pain)   Negative: Low blood pressure is main concern   Negative: [1] Systolic BP  >= 160 OR Diastolic >= 100 AND [2] cardiac or neurologic symptoms (e.g., chest pain, difficulty breathing, unsteady gait, blurred vision)   Negative: Difficult to awaken or acting confused (e.g., disoriented, slurred speech)   Negative: SEVERE difficulty breathing (e.g., struggling for each breath, speaks in single words)   Negative: [1] Weakness of the face, arm or leg on one side of the body AND [2] new-onset   Negative: [1] Numbness (i.e., loss of sensation) of the face, arm or leg on one side of the body AND [2] new-onset   Negative: [1] Chest pain lasts > 5 minutes AND [2] history of heart disease  (i.e., heart attack, bypass surgery, angina, angioplasty, CHF)   Negative: [1] Chest pain AND [2] took nitrogylcerin AND [3] pain was not relieved   Negative: Sounds like a life-threatening emergency to the triager   Negative: [1] Pregnant 20 or more weeks (or postpartum < 6 weeks) AND [2] new hand or face swelling   Negative: [1] Pregnant 20 or more weeks AND [2] Systolic BP  >= 140 OR Diastolic >= 90   Negative: [1] Systolic BP  >= 200 OR Diastolic >= 120  AND [2] having NO cardiac or neurologic symptoms   Negative: [1] Postpartum < 6 weeks AND [2] Systolic BP  >= 140 OR Diastolic >= 90   Negative: [1] Systolic BP  >= 180 OR Diastolic >= 110 AND [2] missed most recent dose of blood pressure medication    Protocols used: [facility]

## 2022-08-15 ENCOUNTER — NURSE TRIAGE (OUTPATIENT)
Dept: ADMINISTRATIVE | Facility: CLINIC | Age: 77
End: 2022-08-15
Payer: MEDICARE

## 2022-08-16 ENCOUNTER — OUTPATIENT CASE MANAGEMENT (OUTPATIENT)
Dept: ADMINISTRATIVE | Facility: OTHER | Age: 77
End: 2022-08-16
Payer: MEDICARE

## 2022-08-16 ENCOUNTER — PES CALL (OUTPATIENT)
Dept: ADMINISTRATIVE | Facility: CLINIC | Age: 77
End: 2022-08-16
Payer: MEDICARE

## 2022-08-16 NOTE — TELEPHONE ENCOUNTER
"Patient daughter states patient's blood pressure reading is 188/56 and called to inquire if ok to give Lopressor 50 mg.     On Call Provider, Dr. Bernardino Hall, contacted for assistance with care advice and stated ok to administer Lopressor 50 mg providing patient's heart rate is > 60.     Care Advice given to patient's daughter that ok to administer Lopressor 50 mg. Daughter states patient's heart rate was 76.    Patient's daughter also inquired if ok to administer patient's evening dosage of insulin for blood glucose reading of 150. Care Advice given to administer patient's insulin as prescribed. Daughter states understanding of care advice and cites no additional concerns at this time.    Reason for Disposition   Health Information question, no triage required and triager able to answer question    Additional Information   Negative: Started suddenly after an allergic medicine, an allergic food, or bee sting   Negative: Shock suspected (e.g., cold/pale/clammy skin, too weak to stand, low BP, rapid pulse)   Negative: Difficult to awaken or acting confused (e.g., disoriented, slurred speech)   Negative: Fainted   Negative: [1] Systolic BP < 90 AND [2] dizzy, lightheaded, or weak   Negative: Chest pain   Negative: Bleeding (e.g., vomiting blood, rectal bleeding or tarry stools, severe vaginal bleeding)(Exception: fainted from sight of small amount of blood; small cut or abrasion)   Negative: Extra heart beats or heart is beating fast  (i.e., "palpitations")   Negative: Sounds like a life-threatening emergency to the triager   Negative: [1] Systolic BP < 80 AND [2] NOT dizzy, lightheaded or weak   Negative: Abdominal pain   Negative: Fever > 100.4 F (38.0 C)   Negative: Major surgery in the past month   Negative: [1] Drinking very little AND [2] dehydration suspected (e.g., no urine > 12 hours, very dry mouth, very lightheaded)   Negative: [1] Fall in systolic BP > 20 mm Hg from normal AND [2] " dizzy, lightheaded, or weak   Negative: Patient sounds very sick or weak to the triager   Negative: [1] Systolic BP < 90 AND [2] NOT dizzy, lightheaded or weak   Negative: [1] Systolic BP  AND [2] taking blood pressure medications AND [3] dizzy, lightheaded or weak   Negative: [1] Systolic BP  AND [2] taking blood pressure medications AND [3] NOT dizzy, lightheaded or weak   Negative: [1] Fall in systolic BP > 20 mm Hg from normal AND [2] NOT dizzy, lightheaded, or weak   Negative: Fall in systolic BP > 20 mmHg after standing up   Negative: Fall in systolic BP > 20 mmHg after eating a meal   Negative: Diastolic BP < 50 mm Hg   Negative: Brief (now gone) dizziness or lightheadedness after standing up or eating   Negative: Wants doctor to measure BP   Negative: [1] Caller is not with the adult (patient) AND [2] reporting urgent symptoms   Negative: Lab result questions   Negative: Medication questions   Negative: Caller can't be reached by phone   Negative: Caller has already spoken to PCP or another triager   Negative: RN needs further essential information from caller in order to complete triage   Negative: Requesting regular office appointment   Negative: [1] Caller requesting NON-URGENT health information AND [2] PCP's office is the best resource    Protocols used: INFORMATION ONLY CALL - NO TRIAGE-A-, BLOOD PRESSURE - LOW-A-

## 2022-08-17 ENCOUNTER — PES CALL (OUTPATIENT)
Dept: ADMINISTRATIVE | Facility: CLINIC | Age: 77
End: 2022-08-17
Payer: MEDICARE

## 2022-08-18 ENCOUNTER — OFFICE VISIT (OUTPATIENT)
Dept: CARDIOLOGY | Facility: CLINIC | Age: 77
End: 2022-08-18
Payer: MEDICARE

## 2022-08-18 ENCOUNTER — TELEPHONE (OUTPATIENT)
Dept: CARDIOLOGY | Facility: CLINIC | Age: 77
End: 2022-08-18

## 2022-08-18 ENCOUNTER — OUTPATIENT CASE MANAGEMENT (OUTPATIENT)
Dept: ADMINISTRATIVE | Facility: OTHER | Age: 77
End: 2022-08-18
Payer: MEDICARE

## 2022-08-18 VITALS
HEIGHT: 65 IN | SYSTOLIC BLOOD PRESSURE: 132 MMHG | WEIGHT: 125.88 LBS | HEART RATE: 75 BPM | OXYGEN SATURATION: 99 % | DIASTOLIC BLOOD PRESSURE: 60 MMHG | BODY MASS INDEX: 20.97 KG/M2

## 2022-08-18 DIAGNOSIS — I25.118 CORONARY ARTERY DISEASE OF NATIVE ARTERY OF NATIVE HEART WITH STABLE ANGINA PECTORIS: ICD-10-CM

## 2022-08-18 DIAGNOSIS — I25.2 HISTORY OF NON-ST ELEVATION MYOCARDIAL INFARCTION (NSTEMI): ICD-10-CM

## 2022-08-18 DIAGNOSIS — I50.9 CONGESTIVE HEART FAILURE, UNSPECIFIED HF CHRONICITY, UNSPECIFIED HEART FAILURE TYPE: ICD-10-CM

## 2022-08-18 DIAGNOSIS — E11.51 TYPE 2 DIABETES MELLITUS WITH DIABETIC PERIPHERAL ANGIOPATHY WITHOUT GANGRENE, WITH LONG-TERM CURRENT USE OF INSULIN: ICD-10-CM

## 2022-08-18 DIAGNOSIS — Z95.1 S/P CABG (CORONARY ARTERY BYPASS GRAFT): Primary | ICD-10-CM

## 2022-08-18 DIAGNOSIS — I10 PRIMARY HYPERTENSION: ICD-10-CM

## 2022-08-18 DIAGNOSIS — Z79.4 TYPE 2 DIABETES MELLITUS WITH DIABETIC PERIPHERAL ANGIOPATHY WITHOUT GANGRENE, WITH LONG-TERM CURRENT USE OF INSULIN: ICD-10-CM

## 2022-08-18 DIAGNOSIS — I65.23 BILATERAL CAROTID ARTERY STENOSIS: ICD-10-CM

## 2022-08-18 DIAGNOSIS — I27.20 PULMONARY HYPERTENSION: ICD-10-CM

## 2022-08-18 DIAGNOSIS — I70.0 AORTIC ATHEROSCLEROSIS: ICD-10-CM

## 2022-08-18 DIAGNOSIS — Z86.16 HISTORY OF COVID-19: ICD-10-CM

## 2022-08-18 PROCEDURE — 3288F FALL RISK ASSESSMENT DOCD: CPT | Mod: CPTII,S$GLB,, | Performed by: INTERNAL MEDICINE

## 2022-08-18 PROCEDURE — 3066F PR DOCUMENTATION OF TREATMENT FOR NEPHROPATHY: ICD-10-PCS | Mod: CPTII,S$GLB,, | Performed by: INTERNAL MEDICINE

## 2022-08-18 PROCEDURE — 4010F PR ACE/ARB THEARPY RXD/TAKEN: ICD-10-PCS | Mod: CPTII,S$GLB,, | Performed by: INTERNAL MEDICINE

## 2022-08-18 PROCEDURE — 99214 PR OFFICE/OUTPT VISIT, EST, LEVL IV, 30-39 MIN: ICD-10-PCS | Mod: S$GLB,,, | Performed by: INTERNAL MEDICINE

## 2022-08-18 PROCEDURE — 3075F SYST BP GE 130 - 139MM HG: CPT | Mod: CPTII,S$GLB,, | Performed by: INTERNAL MEDICINE

## 2022-08-18 PROCEDURE — 3288F PR FALLS RISK ASSESSMENT DOCUMENTED: ICD-10-PCS | Mod: CPTII,S$GLB,, | Performed by: INTERNAL MEDICINE

## 2022-08-18 PROCEDURE — 4010F ACE/ARB THERAPY RXD/TAKEN: CPT | Mod: CPTII,S$GLB,, | Performed by: INTERNAL MEDICINE

## 2022-08-18 PROCEDURE — 99999 PR PBB SHADOW E&M-EST. PATIENT-LVL IV: ICD-10-PCS | Mod: PBBFAC,,, | Performed by: INTERNAL MEDICINE

## 2022-08-18 PROCEDURE — 1159F MED LIST DOCD IN RCRD: CPT | Mod: CPTII,S$GLB,, | Performed by: INTERNAL MEDICINE

## 2022-08-18 PROCEDURE — 99499 RISK ADDL DX/OHS AUDIT: ICD-10-PCS | Mod: HCNC,S$GLB,, | Performed by: INTERNAL MEDICINE

## 2022-08-18 PROCEDURE — 1126F AMNT PAIN NOTED NONE PRSNT: CPT | Mod: CPTII,S$GLB,, | Performed by: INTERNAL MEDICINE

## 2022-08-18 PROCEDURE — 1111F PR DISCHARGE MEDS RECONCILED W/ CURRENT OUTPATIENT MED LIST: ICD-10-PCS | Mod: CPTII,S$GLB,, | Performed by: INTERNAL MEDICINE

## 2022-08-18 PROCEDURE — 3066F NEPHROPATHY DOC TX: CPT | Mod: CPTII,S$GLB,, | Performed by: INTERNAL MEDICINE

## 2022-08-18 PROCEDURE — 1111F DSCHRG MED/CURRENT MED MERGE: CPT | Mod: CPTII,S$GLB,, | Performed by: INTERNAL MEDICINE

## 2022-08-18 PROCEDURE — 3046F HEMOGLOBIN A1C LEVEL >9.0%: CPT | Mod: CPTII,S$GLB,, | Performed by: INTERNAL MEDICINE

## 2022-08-18 PROCEDURE — 1126F PR PAIN SEVERITY QUANTIFIED, NO PAIN PRESENT: ICD-10-PCS | Mod: CPTII,S$GLB,, | Performed by: INTERNAL MEDICINE

## 2022-08-18 PROCEDURE — 99214 OFFICE O/P EST MOD 30 MIN: CPT | Mod: S$GLB,,, | Performed by: INTERNAL MEDICINE

## 2022-08-18 PROCEDURE — 3078F DIAST BP <80 MM HG: CPT | Mod: CPTII,S$GLB,, | Performed by: INTERNAL MEDICINE

## 2022-08-18 PROCEDURE — 1160F PR REVIEW ALL MEDS BY PRESCRIBER/CLIN PHARMACIST DOCUMENTED: ICD-10-PCS | Mod: CPTII,S$GLB,, | Performed by: INTERNAL MEDICINE

## 2022-08-18 PROCEDURE — 99499 UNLISTED E&M SERVICE: CPT | Mod: HCNC,S$GLB,, | Performed by: INTERNAL MEDICINE

## 2022-08-18 PROCEDURE — 3078F PR MOST RECENT DIASTOLIC BLOOD PRESSURE < 80 MM HG: ICD-10-PCS | Mod: CPTII,S$GLB,, | Performed by: INTERNAL MEDICINE

## 2022-08-18 PROCEDURE — 1160F RVW MEDS BY RX/DR IN RCRD: CPT | Mod: CPTII,S$GLB,, | Performed by: INTERNAL MEDICINE

## 2022-08-18 PROCEDURE — 99999 PR PBB SHADOW E&M-EST. PATIENT-LVL IV: CPT | Mod: PBBFAC,,, | Performed by: INTERNAL MEDICINE

## 2022-08-18 PROCEDURE — 3075F PR MOST RECENT SYSTOLIC BLOOD PRESS GE 130-139MM HG: ICD-10-PCS | Mod: CPTII,S$GLB,, | Performed by: INTERNAL MEDICINE

## 2022-08-18 PROCEDURE — 1159F PR MEDICATION LIST DOCUMENTED IN MEDICAL RECORD: ICD-10-PCS | Mod: CPTII,S$GLB,, | Performed by: INTERNAL MEDICINE

## 2022-08-18 PROCEDURE — 1101F PT FALLS ASSESS-DOCD LE1/YR: CPT | Mod: CPTII,S$GLB,, | Performed by: INTERNAL MEDICINE

## 2022-08-18 PROCEDURE — 1101F PR PT FALLS ASSESS DOC 0-1 FALLS W/OUT INJ PAST YR: ICD-10-PCS | Mod: CPTII,S$GLB,, | Performed by: INTERNAL MEDICINE

## 2022-08-18 PROCEDURE — 3046F PR MOST RECENT HEMOGLOBIN A1C LEVEL > 9.0%: ICD-10-PCS | Mod: CPTII,S$GLB,, | Performed by: INTERNAL MEDICINE

## 2022-08-18 NOTE — PROGRESS NOTES
Outpatient Care Management  Plan of Care Follow Up Visit    Patient: Avril Monzon  MRN: [id number]  Date of Service: 08/18/2022  Completed by: Debbie Lopez RN  Referral Date: 12/20/2021    Reason for Visit   Patient presents with    Update Plan Of Care       Brief Summary: Phone contact with Elizabeth today and reviewed upcoming cardio appt today and Diab NP and RD appts on 8/30/22. Also discussed current blood sugar monitoring status. Elizabeth agreed to follow up contact from this CM before upcoming Diab NP and RD appts.     Patient Summary     Involvement of Care:  Do I have permission to speak with other family members about your care?       Patient Reported Labs & Vitals:  1.  Any Patient Reported Labs & Vitals?     2.  Patient Reported Blood Pressure:     3.  Patient Reported Pulse:     4.  Patient Reported Weight (Kg):     5.  Patient Reported Blood Glucose (mg/dl):       Medical and social history was reviewed with patient and/or caregiver.     Clinical Assessment     Reviewed and provided basic information on available community resources for mental health, transportation, wellness resources, and palliative care programs with patient and/or caregiver.     Complex Care Plan     Care plan was discussed and completed today with input from patient and/or caregiver.    Patient Instructions     Instructions were provided via the [facility] patient resources and are available for the patient to view on the patient portal.    Next Steps: Review need to take current testing supplies to Diab NP and RD appts so new insurance provided supplies can be ordered. Debbie Lopez RN    Follow up in about 11 days (around 8/29/2022) for address care plan tasks.    Todays [facility] Self-Management Care Plan was developed with the patients/caregivers input and was based on identified barriers from todays assessment.  Goals were written today with the patient/caregiver and the patient has agreed to work towards these goals to improve  his/her overall well-being. Patient verbalized understanding of the care plan, goals, and all of today's instructions. Encouraged patient/caregiver to communicate with his/her physician and health care team about health conditions and the treatment plan.  Provided my contact information today and encouraged patient/caregiver to call me with any questions as needed.

## 2022-08-18 NOTE — TELEPHONE ENCOUNTER
----- Message from Elvia Denny sent at 8/17/2022  1:07 PM CDT -----  Contact: Elizabeth/daughter  Elizabeth/daughter is calling to speak with the nurse, regarding rescheduling 8/04/2022 appointment. Please give daughter a call at [phone] as requested.  Thanks  LR

## 2022-08-18 NOTE — PROGRESS NOTES
Subjective:   Patient ID:  Avril Monzon is a 77 y.o. female who presents for cardiac consult of No chief complaint on file.      Palpitations   Associated symptoms include malaise/fatigue. Pertinent negatives include no chest pain or shortness of breath.   Chest Pain   Associated symptoms include malaise/fatigue. Pertinent negatives include no palpitations or shortness of breath.     The patient came in today for cardiac consult of No chief complaint on file.    Avril Monzon is a 77 y.o. female pt with  CAD s/p CABG x 2, previous NSTEMI with PCI of LAD in 12/18, HTN, HLD, DM type II, former tobacco abuse, ESRD on HD, anemia, history of CVA, Carotid artery disease, here for CV follow up.     Avril Monzon is a 77 y.o. female  admitted to ICU on NIV with a dx of acute on chronic systolic and diastolic CHF exacerbation , Volume overload , Uncontrolled HTN , Acute hypoxic respiratory failure  And CKD stage 5 . She was started on NIV , NTG drip and lasix drip with and improvement of her sx . She is (-) 2.3 lt since admission . She is wean off  BIPAP .   She cont on NTG drip and lasix drip . The BP has improved  . Her home BP medication were resume .   She has improved with IV diuresis, cardiology consulted for CHF. Discussed will need volume removal and will work up pulm HTN as outpt.    7/4/20 - changed to lasix 80 with BIdil, pt feels well ready for DC today. NO acute events overnight. Will follow up in CV clinic    7/20/20 - Hosp follow up  ECHO in hosp with elevated PA pressures, with mild to moderate TR/MR. She feels well, did not need oxygen at night. Swelling is stable/improved. No CP. Is active at home.     8/28/20  She has been feeling better but has been having more palpitations x 1 week. Stable dyspnea no chest pain. She feels more tired at times. BP stable at home, is on Torsemide now.    10/13/20  Holter with occ PVCs, freq PACs, AVG HR 72. No further palpitations.  BP elevated today 176/60. She also  has pain in right shoulder blade, going to right breast. She slept with arm above head once.     11/17/20  BP low today, she went to ER yesterday and received IVF. She felt fatigue, not lightheaded and was dyspneic. She started HD about a month ago. Will titrate and adjust meds.   She will receive blood today.     3/9/21  Mild to moderate carotid artery disease in 10/2020. Pt unsure of some of her meds, daughter isn't present. occ BP low 100s. She has back pain since fall a few years ago, gets tired/back pain when standing. No CP/SOB. She had both COVID vaccines.     3/26/21 - ER follow up    2 weeks ago - from notes  yesterday had dialysis- completed regularly. BP is elevated--this am: 190s/60s(possibly 189/68); has taken am metoprolol. Has continued post dialysis headache- took BC powder like med for headache- this normally relieves and it has not relieved headache.BP last night: 170s/60s last night. Took metoprolol 25 mg at bedtime.No black or bloody stools or blood in urine, no unusual bruising access site looks wdl.   Recheck [id number]: 215/65 advised daughter to bring pt to ED now for evaluation.    ER visit c/o elevated BP. Pt with hx of ESRD on HD M-W-F. She states this morning when she woke up at 0400 and her BP was 170 systolic. She states around 0900 this morning her BP was 190 systolic. She states she took her blood pressure medicine and came here. Pt states she has a slight headache but it is resolving.    BP in ER was normal, headache resolved.     7/2/21  Increased BB last visit. BP and HR well controlled today. She had missed BB for a few days her  had colon CA and surgery has been in hospital for a month so far. She has more back pain lately as well.   ECG - NSR, LAE    9/15/21  She has had issues getting to see her pain doc as she missed appts/changed appts due to availability. She has been having itching issues - she went to derm, possible had some allergies. BP and HR well controlled today.      12/14/21  [facility] - presents to the Emergency Department for SOB, onset last night. Associated with orthopnea, nonproductive cough and wheezing. Pt dialyzes every M/W/F, and last dialyzed 3 days ago (Friday). Pt has not yet dialyzed today due to her sxs. Denies recent fever, chills, n/v/d, CP, weakness, numbness, dizziness, headache, and all other sxs at this time  Hospital Course:   Patient admitted to noncompliance with diet and increased Na intake over the weekend. She received dialysis yesterday and denies feeling SOB. Trop trended and case discussed with cards. Pateint denies CP. All home meds continued except for lisinopril as she reports being taken off. Plavix and statin scripts refilled She will get dialyzed again tomorrow MWF. In stable condition at time of discharge and patient agreeable with plan. Instructed to follow up with PCP for post hospital visit stay.     Elevated troponin in setting of HTN urgency and Acute CHF  No need for IV heparin gtt since chest pain free  Trend serial troponin  Check ECHO  Optimize meds for better BP control  Nephro consult for HD needs during admission  Continue ASA Statin Plavix Bidil, BB ACei  Today pt feels well, BP and HR stable.     2/17/22  Nuclear stress neg for ischemia, Scar noted. EF normal. No CP, has chronic ALVARADO with fatigue, stable. She had BNP in dec > 2K, has been getting more HD off lately.     7/5/22  She has upcoming colonoscopy on 7/7/22 with Dr. Goss. A1c in March > 10. Pt was out of plavix did not take it for a while but got refill.       August HPI:   75 y/o female with a pmh of ESRD on HD, DM2, hyperlipidemia,CAD s/p CABG  , Tobacco abuse , HTN   and h/o CVA presented to the ER wth a C/O Cough for the last 7 days days  which has gotten worse . She was Dx with COVID-19 infection on 8/3/22 an d/c home from the ER . She is s/p MAB infusion on 8/4/22 . She is s/p CABG  on 8/20  and d/c home ( LOS 16 days , D/c on 8/28). She is complaining of dry  cough  associated with SOB and chest tightness .  It is also associted with  fatigue and tiredness . She denies any fever , chills , GI or  sx . The chest pain pain is located on the mid sternum and is worsening  with cough .   ER COURSE: CXR show b/l pulmonary congestion , troponin 0.290 , BNP> 4900 . Cardiology consulted and Rec to start heaprin drip .  ER VS:   BP Pulse Resp Temp SpO2   08/07/22 0812 08/07/22 0812 08/07/22 0812 08/07/22 0859 08/07/22 0812   (!) 179/73 92 20 99.6 °F (37.6 °C) 98 %          Pt will be admitted to observation with a Dx of COVID-19 infection , elevated troponin and ESRD  CODE STATUS : FULL CODE     Hospital Course:   Patient was kept on OBS for COVID under the care of hospital medicine, she was started on PO dexamethasone and IV remdesivir. Nephrology was consulted for ESRD on HD. 08/08: Patient with elevated temp overnight - she is requiring 2L oxygen via NC to maintain oxygen sats. Continue PO dexamethasone & IV remdesivir. Getting HD at present. Monitor H/H - getting epo with HD - repeat CBC in AM.  08/09:Patient improved overnight - afebrile. Labs stable with increase in HGB. She is stable to dc home. She will continue PO dexamethasone, tessalon pearls, MVI, vit C, pepcid, and melatonin. Patient high risk for readmission. Referred to NP at home program for ongoing management, to increase compliance, and decrease readmission. Patient was seen and examined today and deemed stable for discharge home.      8/18/22  PT had CP/SOB and NSTEMI s/p LHC with MV CAD s/p CABG x 2 in July 2022, later had COVID 19 8/3/22 and was admitted to hosp and DC once stable. She has been losing weight, needs to increase food.     Patient feels  no chest pain, no PND, no dizziness, no syncope, no CNS symptoms.    Patient has fairly good exercise tolerance.    Patient is compliant with medications.       Results for orders placed during the hospital encounter of 07/12/22    Echo    Interpretation  Summary  · Concentric hypertrophy and normal systolic function.  · The estimated ejection fraction is 55%.  · Normal left ventricular diastolic function.  · Normal right ventricular size with normal right ventricular systolic function.  · Moderate tricuspid regurgitation.  · There is pulmonary hypertension.      Results for orders placed during the hospital encounter of 07/12/22    Cardiac catheterization    Conclusion  · The pre-procedure left ventricular end diastolic pressure was 31.  · The post-procedure left ventricular end diastolic pressure was 32.  · The ejection fraction was calculated to be 40%.  · The Prox RCA to Mid RCA lesion was 100% stenosed.  · The Ost Cx to Prox Cx lesion was 99% stenosed.  · The Prox LAD to Mid LAD lesion was 75% stenosed.  · The estimated blood loss was none.  · There was three vessel coronary artery disease.    The procedure log was documented by Documenter: Daryl Lamb RN and verified by Abhi Sloan MD.    Date: 7/14/2022  Time: 4:57 AM      Results for orders placed during the hospital encounter of 01/04/22    Nuclear Stress - Cardiology Interpreted    Interpretation Summary    Abnormal myocardial perfusion scan.    There is a severe intensity, moderate to large sized fixed defect consistent with scar in the anteroapical wall(s).    The gated perfusion images showed an ejection fraction of 64% at rest. The gated perfusion images showed an ejection fraction of 65% post stress.    The EKG portion of this study is negative for ischemia.    During stress, rare PVCs are noted.      Carotid u/s  Conclusion    · There is 20-39% right Internal Carotid Stenosis.  · There is 40-49% left Internal Carotid Stenosis        HOLTER 8/13/20  Predominant Rhythm  Sinus rhythm with heart rates varying between 57 and 96 bpm with an average of 72 bpm.   PVC    Ventricular Arrhythmias  There were occasional PVCs totalling 1078 and averaging 11.23 per hour.   PAC    Supraventricular  Arrhythmias  There were frequent PACs totalling 6784 and averaging 70.67 per hour.         Past Medical History:   Diagnosis Date    Acute hypoxemic respiratory failure 11/26/2018    Anemia     Arthritis     back, hand, knees    Back pain     Cataract     CKD stage G4/A3, GFR 15 - 29 and albumin creatinine ratio >300 mg/g     GFR 40% Jan 2014 and 33% ub 3/2014 (BR[facility])    Coronary artery disease involving native coronary artery of native heart 11/30/2018    Diabetic retinopathy     DM (diabetes mellitus) type II controlled, neurological manifestation     Exudative age-related macular degeneration of left eye with active choroidal neovascularization 2/5/2019    GERD (gastroesophageal reflux disease)     Glaucoma     Heart failure     Hyperlipidemia     Hypertension     Non-ST elevation MI (NSTEMI) 11/28/2018    NSTEMI (non-ST elevated myocardial infarction) 11/27/2018    Peripheral neuropathy     Polyneuropathy     Proteinuria     >6 mo     Seizures     BRG 1/2014 -dick CT NRI showed small vessel    Stroke 2012,2014    Tobacco dependence     Type 2 diabetes with peripheral circulatory disorder, controlled        Past Surgical History:   Procedure Laterality Date    BREAST LUMPECTOMY Left     benign, when patient was 15 years old    BREAST MASS EXCISION      ,benign, age 15.    CATHETERIZATION OF BOTH LEFT AND RIGHT HEART N/A 11/28/2018    Procedure: CATHETERIZATION, HEART, BOTH LEFT AND RIGHT;  Surgeon: Michelle Holman MD;  Location: [facility] CATH LAB;  Service: Cardiology;  Laterality: N/A;    CATHETERIZATION OF BOTH LEFT AND RIGHT HEART N/A 11/30/2018    Procedure: CATHETERIZATION, HEART, BOTH LEFT AND RIGHT;  Surgeon: Michelle Holman MD;  Location: [facility] CATH LAB;  Service: Cardiology;  Laterality: N/A;    CORONARY ARTERY BYPASS GRAFT (CABG) N/A 7/20/2022    Procedure: CORONARY ARTERY BYPASS GRAFT (CABG);  Surgeon: Sanju Locke MD;  Location: [facility] OR;  Service: Cardiothoracic;  Laterality:  N/A;  2-VESSEL PUMP ASSIST WITH EPI-AORTIC ULTRASOUND    ENDOSCOPIC HARVEST OF VEIN Left 2022    Procedure: SURGICAL PROCUREMENT, VEIN, ENDOSCOPIC;  Surgeon: Sanju Locke MD;  Location: [facility] OR;  Service: Cardiothoracic;  Laterality: Left;    HYSTERECTOMY  1982    INJECTION OF ANESTHETIC AGENT AROUND MULTIPLE INTERCOSTAL NERVES N/A 2022    Procedure: BLOCK, NERVE, INTERCOSTAL, 2 OR MORE;  Surgeon: Sanju Locke MD;  Location: [facility] OR;  Service: Cardiothoracic;  Laterality: N/A;  PARASTERNAL NERVE BLOCK    INSERTION OF SHUNT      LEFT HEART CATHETERIZATION Left 12/3/2018    Procedure: CATHETERIZATION, HEART, LEFT;  Surgeon: Michelle Holman MD;  Location: [facility] CATH LAB;  Service: Cardiology;  Laterality: Left;  LAD ATHERECTOMY STENT/ FEMORAL APPROACH    LEFT HEART CATHETERIZATION Left 2022    Procedure: CATHETERIZATION, HEART, LEFT;  Surgeon: Abhi Sloan MD;  Location: [facility] CATH LAB;  Service: Cardiology;  Laterality: Left;    OOPHORECTOMY      wrist cyst Right 1980s    dorsal wrist cyst       Social History     Tobacco Use    Smoking status: Former Smoker     Packs/day: 1.50     Years: 30.00     Pack years: 45.00     Types: Cigarettes     Quit date: 1990     Years since quittin.6    Smokeless tobacco: Former User   Substance Use Topics    Alcohol use: No     Alcohol/week: 0.0 standard drinks    Drug use: No       Family History   Problem Relation Age of Onset    Diabetes Mother     Hyperlipidemia Mother     Hypertension Mother     Heart disease Mother         MI    Muscular dystrophy Son     Cancer Maternal Grandmother         colon       Patient's Medications   New Prescriptions    No medications on file   Previous Medications    ACCU-CHEK ARDEN PLUS METER [facility]    USE TO TEST TWICE DAILY    ASCORBIC ACID, VITAMIN C, (VITAMIN C) 500 MG TABLET    Take 1 tablet (500 mg total) by mouth once daily.    ASPIRIN (ECOTRIN) 81 MG EC TABLET    Take 1 tablet (81 mg  total) by mouth once daily.    AZELASTINE (ASTELIN) 137 MCG (0.1 %) NASAL SPRAY    SMARTSI Spray(s) Both Nares Twice Daily PRN    BENZONATATE (TESSALON) 100 MG CAPSULE    Take 1 capsule (100 mg total) by mouth 3 (three) times daily as needed for Cough.    BLOOD SUGAR DIAGNOSTIC STRP    To check BG 2 times daily, to use with insurance preferred meter    CLOPIDOGREL (PLAVIX) 75 MG TABLET    Take 1 tablet (75 mg total) by mouth once daily.    CYANOCOBALAMIN (VITAMIN B-12) 1000 MCG TABLET    Take 100 mcg by mouth once daily.    DEXAMETHASONE (DECADRON) 6 MG TABLET    Take 1 tablet (6 mg total) by mouth once daily. for 8 days    FAMOTIDINE (PEPCID) 20 MG TABLET    Take 1 tablet (20 mg total) by mouth once daily at 6am.    FOLIC ACID (FOLVITE) 1 MG TABLET    Take 1 mg by mouth once daily.    INSULIN NPH/REG HUMAN (HUMULIN, 70/30,) 100 UNIT/ML (70-30) INPN PEN    To do 28 units subq in AM and 8 units subq in PM.  Dose to be done within 15 min before or after meal.    LANCETS MISC    To check BG 2 times daily, to use with insurance preferred meter    MELATONIN (MELATIN) 3 MG TABLET    Take 2 tablets (6 mg total) by mouth nightly as needed for Insomnia.    METOPROLOL TARTRATE (LOPRESSOR) 50 MG TABLET    Take 1 tablet (50 mg total) by mouth 2 (two) times daily.    MULTIVITAMIN TAB    Take 1 tablet by mouth once daily.    SEMAGLUTIDE (OZEMPIC) 0.25 MG OR 0.5 MG(2 MG/1.5 ML) PEN INJECTOR    Inject 0.25 mg into the skin every 7 days.    SEVELAMER CARBONATE (RENVELA) 800 MG TAB    TAKE 2 TABLETS BY MOUTH THREE TIMES DAILY    TELMISARTAN (MICARDIS) 20 MG TAB    Take 1 tablet (20 mg total) by mouth once daily.    TRAMADOL (ULTRAM) 50 MG TABLET    Take 1 tablet (50 mg total) by mouth every 8 (eight) hours as needed for Pain.    TRIAMCINOLONE ACETONIDE 0.1% (KENALOG) 0.1 % CREAM    Apply topically 2 (two) times daily.   Modified Medications    No medications on file   Discontinued Medications    No medications on file       Review  "of Systems   Constitutional: Positive for malaise/fatigue.   HENT: Negative.    Eyes: Negative.    Respiratory: Negative for shortness of breath.    Cardiovascular: Negative for chest pain, palpitations and leg swelling.   Gastrointestinal: Negative.    Genitourinary: Negative.    Musculoskeletal: Negative.    Skin: Negative.    Neurological: Negative.    Endo/Heme/Allergies: Negative.    Psychiatric/Behavioral: Negative.    All 12 systems otherwise negative.      Wt Readings from Last 3 Encounters:   08/18/22 57.1 kg (125 lb 14.1 oz)   08/11/22 59.9 kg (132 lb)   08/09/22 60.1 kg (132 lb 7.9 oz)     Temp Readings from Last 3 Encounters:   08/09/22 97.9 °F (36.6 °C) (Oral)   08/04/22 96.8 °F (36 °C) (Tympanic)   08/03/22 98.7 °F (37.1 °C)     BP Readings from Last 3 Encounters:   08/18/22 132/60   08/11/22 126/60   08/09/22 (!) 109/53     Pulse Readings from Last 3 Encounters:   08/18/22 75   08/11/22 67   08/09/22 72       /60 (BP Location: Left arm, Patient Position: Sitting)   Pulse 75   Ht 5' 5" (1.651 m)   Wt 57.1 kg (125 lb 14.1 oz)   LMP  (LMP Unknown)   SpO2 99%   BMI 20.95 kg/m²     Objective:   Physical Exam  Vitals and nursing note reviewed.   Constitutional:       General: She is not in acute distress.     Appearance: She is well-developed. She is not diaphoretic.   HENT:      Head: Normocephalic and atraumatic.      Nose: Nose normal.   Eyes:      General: No scleral icterus.     Conjunctiva/sclera: Conjunctivae normal.   Neck:      Thyroid: No thyromegaly.      Vascular: No JVD.   Cardiovascular:      Rate and Rhythm: Normal rate and regular rhythm.      Heart sounds: S1 normal and S2 normal. Murmur heard.     No friction rub. No gallop. No S3 or S4 sounds.   Pulmonary:      Effort: Pulmonary effort is normal. No respiratory distress.      Breath sounds: Normal breath sounds. No stridor. No wheezing or rales.   Chest:      Chest wall: No tenderness.   Abdominal:      General: Bowel sounds " are normal. There is no distension.      Palpations: Abdomen is soft. There is no mass.      Tenderness: There is no abdominal tenderness. There is no rebound.   Genitourinary:     Comments: Deferred  Musculoskeletal:         General: No tenderness or deformity. Normal range of motion.      Cervical back: Normal range of motion and neck supple.   Lymphadenopathy:      Cervical: No cervical adenopathy.   Skin:     General: Skin is warm and dry.      Coloration: Skin is not pale.      Findings: No erythema or rash.   Neurological:      Mental Status: She is alert and oriented to person, place, and time.      Motor: No abnormal muscle tone.      Coordination: Coordination normal.   Psychiatric:         Behavior: Behavior normal.         Thought Content: Thought content normal.         Judgment: Judgment normal.         Lab Results   Component Value Date     (L) 08/09/2022    K 4.1 08/09/2022    CL 94 (L) 08/09/2022    CO2 23 08/09/2022    BUN 41 (H) 08/09/2022    CREATININE 4.5 (H) 08/09/2022     (H) 08/09/2022    HGBA1C 9.3 (H) 07/12/2022    HGBA1C 7.3 04/26/2021    MG 2.0 08/09/2022    AST 16 08/09/2022    ALT 9 (L) 08/09/2022    ALBUMIN 2.4 (L) 08/09/2022    PROT 5.2 (L) 08/09/2022    BILITOT 1.1 (H) 08/09/2022    WBC 6.74 08/09/2022    HGB 8.8 (L) 08/09/2022    HCT 27.1 (L) 08/09/2022    HCT 30 (L) 07/20/2022     (H) 08/09/2022     08/09/2022    INR 1.0 08/07/2022    TSH 1.792 01/26/2021    CHOL 171 07/13/2022    HDL 59 07/13/2022    LDLCALC 102.8 07/13/2022    TRIG 46 07/13/2022    BNP >4,900 (H) 08/07/2022     Assessment:      1. S/P CABG (coronary artery bypass graft)    2. Congestive heart failure, unspecified HF chronicity, unspecified heart failure type    3. Primary hypertension    4. Aortic atherosclerosis    5. Bilateral carotid artery stenosis    6. Coronary artery disease of native artery of native heart with stable angina pectoris    7. Pulmonary hypertension    8. Type 2  diabetes mellitus with diabetic peripheral angiopathy without gangrene, with long-term current use of insulin    9. History of COVID-19    10. History of non-ST elevation myocardial infarction (NSTEMI)        Plan:   1. HFpEF with mild to mod MR/TR with pulm HTN; BNP 2742   - was on torsemide and bumex but rec HD for volume removal   - monitor BP and weights  - low salt diet  - rec compression stockings    2. CAD s/p CABG x 2 July 2022, PCI of LAD 12/2018; h/o NSTEMI  - cont asa, plavix, statin, BB  - stable    3. HTN - stable  - cont meds and titrate   - cont Bidil    4. Carotid artery stenosis , mild to mod  - cont to monitor  - cont asa, statin  - stable on u/s 9/2021    5. ESRD on HD with anemia  - cont HD    6. H/o CVA   - cont asa, statin, plavix  - refer to neuro     7. Palpitations  - Holter -  occ PVCs, freq PACs, AVG HR 72   - cont BB - increased BB to 50 mg BID  - dec caffeine     8. Anemia  - s/p blood  - procrit with HD    9. Back pain  - f/u pain management    10. Itching/allergies?  - refer to allergies, f/u derm    11. DM2 A1c 8.6 --> 10.8 -> 9.3  - uncontrolled, f/u PCP/Diabetic management  - is taking insulin  - start Ozempic 0.25 to improve sugars/CV risk eval    12. H/O COVID 19  - stable now post DC    Thank you for allowing me to participate in this patient's care. Please do not hesitate to contact me with any questions or concerns. Consult note has been forwarded to the referral physician.

## 2022-08-18 NOTE — PROGRESS NOTES
Left message for patient to call in the morning. Met briefly with patient and her daughter before her clinic appointment. Provided written information about [facility] and business card.

## 2022-08-19 ENCOUNTER — PES CALL (OUTPATIENT)
Dept: ADMINISTRATIVE | Facility: CLINIC | Age: 77
End: 2022-08-19
Payer: MEDICARE

## 2022-08-22 ENCOUNTER — PATIENT MESSAGE (OUTPATIENT)
Dept: CARDIOTHORACIC SURGERY | Facility: CLINIC | Age: 77
End: 2022-08-22
Payer: MEDICARE

## 2022-08-22 ENCOUNTER — EXTERNAL HOME HEALTH (OUTPATIENT)
Dept: HOME HEALTH SERVICES | Facility: HOSPITAL | Age: 77
End: 2022-08-22
Payer: MEDICARE

## 2022-08-22 NOTE — TELEPHONE ENCOUNTER
Patient come in for a visit with Dr. Mejia. The patient was handed the RX for the wheelchair. The daughter of the patient stated she would reach out to the Home health Nurse for more details.

## 2022-08-23 ENCOUNTER — DOCUMENT SCAN (OUTPATIENT)
Dept: HOME HEALTH SERVICES | Facility: HOSPITAL | Age: 77
End: 2022-08-23
Payer: MEDICARE

## 2022-08-24 ENCOUNTER — TELEPHONE (OUTPATIENT)
Dept: FAMILY MEDICINE | Facility: CLINIC | Age: 77
End: 2022-08-24
Payer: MEDICARE

## 2022-08-24 NOTE — TELEPHONE ENCOUNTER
----- Message from Ana Hill sent at 8/24/2022  4:46 PM CDT -----  Contact: Sunday Brand from [facility] [facility] is requesting the order for the Lightweight wheel chair with the cushions and belt for this patient be corrected and ref-axed to [facility] for the patient as soon  as possible please.      [facility] [phone]    Please kristal Brand at [phone]    Thanks

## 2022-08-25 ENCOUNTER — OUTPATIENT CASE MANAGEMENT (OUTPATIENT)
Dept: ADMINISTRATIVE | Facility: OTHER | Age: 77
End: 2022-08-25
Payer: MEDICARE

## 2022-08-25 ENCOUNTER — OFFICE VISIT (OUTPATIENT)
Dept: INTERNAL MEDICINE | Facility: CLINIC | Age: 77
End: 2022-08-25
Payer: MEDICARE

## 2022-08-25 ENCOUNTER — HOSPITAL ENCOUNTER (OUTPATIENT)
Dept: RADIOLOGY | Facility: HOSPITAL | Age: 77
Discharge: HOME | End: 2022-08-25
Attending: PHYSICIAN ASSISTANT
Payer: MEDICARE

## 2022-08-25 VITALS
BODY MASS INDEX: 21.31 KG/M2 | DIASTOLIC BLOOD PRESSURE: 60 MMHG | HEIGHT: 65 IN | WEIGHT: 127.88 LBS | OXYGEN SATURATION: 94 % | HEART RATE: 81 BPM | SYSTOLIC BLOOD PRESSURE: 138 MMHG | TEMPERATURE: 98 F | RESPIRATION RATE: 16 BRPM

## 2022-08-25 DIAGNOSIS — R05.8 DRY COUGH: ICD-10-CM

## 2022-08-25 DIAGNOSIS — N18.6 ESRD (END STAGE RENAL DISEASE): ICD-10-CM

## 2022-08-25 DIAGNOSIS — R82.90 ABNORMAL URINE ODOR: ICD-10-CM

## 2022-08-25 DIAGNOSIS — Z86.16 HISTORY OF COVID-19: ICD-10-CM

## 2022-08-25 DIAGNOSIS — R05.8 DRY COUGH: Primary | ICD-10-CM

## 2022-08-25 DIAGNOSIS — R06.02 SHORTNESS OF BREATH: ICD-10-CM

## 2022-08-25 DIAGNOSIS — J30.9 ALLERGIC RHINITIS, UNSPECIFIED SEASONALITY, UNSPECIFIED TRIGGER: ICD-10-CM

## 2022-08-25 DIAGNOSIS — I10 PRIMARY HYPERTENSION: ICD-10-CM

## 2022-08-25 DIAGNOSIS — R09.82 POST-NASAL DRIP: ICD-10-CM

## 2022-08-25 PROCEDURE — 1101F PR PT FALLS ASSESS DOC 0-1 FALLS W/OUT INJ PAST YR: ICD-10-PCS | Mod: CPTII,S$GLB,, | Performed by: PHYSICIAN ASSISTANT

## 2022-08-25 PROCEDURE — 1160F PR REVIEW ALL MEDS BY PRESCRIBER/CLIN PHARMACIST DOCUMENTED: ICD-10-PCS | Mod: CPTII,S$GLB,, | Performed by: PHYSICIAN ASSISTANT

## 2022-08-25 PROCEDURE — 3288F FALL RISK ASSESSMENT DOCD: CPT | Mod: CPTII,S$GLB,, | Performed by: PHYSICIAN ASSISTANT

## 2022-08-25 PROCEDURE — 4010F ACE/ARB THERAPY RXD/TAKEN: CPT | Mod: CPTII,S$GLB,, | Performed by: PHYSICIAN ASSISTANT

## 2022-08-25 PROCEDURE — 99214 OFFICE O/P EST MOD 30 MIN: CPT | Mod: S$GLB,,, | Performed by: PHYSICIAN ASSISTANT

## 2022-08-25 PROCEDURE — 99214 PR OFFICE/OUTPT VISIT, EST, LEVL IV, 30-39 MIN: ICD-10-PCS | Mod: S$GLB,,, | Performed by: PHYSICIAN ASSISTANT

## 2022-08-25 PROCEDURE — 1160F RVW MEDS BY RX/DR IN RCRD: CPT | Mod: CPTII,S$GLB,, | Performed by: PHYSICIAN ASSISTANT

## 2022-08-25 PROCEDURE — 3078F DIAST BP <80 MM HG: CPT | Mod: CPTII,S$GLB,, | Performed by: PHYSICIAN ASSISTANT

## 2022-08-25 PROCEDURE — 1126F AMNT PAIN NOTED NONE PRSNT: CPT | Mod: CPTII,S$GLB,, | Performed by: PHYSICIAN ASSISTANT

## 2022-08-25 PROCEDURE — 1126F PR PAIN SEVERITY QUANTIFIED, NO PAIN PRESENT: ICD-10-PCS | Mod: CPTII,S$GLB,, | Performed by: PHYSICIAN ASSISTANT

## 2022-08-25 PROCEDURE — 1159F MED LIST DOCD IN RCRD: CPT | Mod: CPTII,S$GLB,, | Performed by: PHYSICIAN ASSISTANT

## 2022-08-25 PROCEDURE — 71046 XR CHEST PA AND LATERAL: ICD-10-PCS | Mod: 26,,, | Performed by: RADIOLOGY

## 2022-08-25 PROCEDURE — 3075F PR MOST RECENT SYSTOLIC BLOOD PRESS GE 130-139MM HG: ICD-10-PCS | Mod: CPTII,S$GLB,, | Performed by: PHYSICIAN ASSISTANT

## 2022-08-25 PROCEDURE — 99999 PR PBB SHADOW E&M-EST. PATIENT-LVL V: ICD-10-PCS | Mod: PBBFAC,,, | Performed by: PHYSICIAN ASSISTANT

## 2022-08-25 PROCEDURE — 1159F PR MEDICATION LIST DOCUMENTED IN MEDICAL RECORD: ICD-10-PCS | Mod: CPTII,S$GLB,, | Performed by: PHYSICIAN ASSISTANT

## 2022-08-25 PROCEDURE — 1111F PR DISCHARGE MEDS RECONCILED W/ CURRENT OUTPATIENT MED LIST: ICD-10-PCS | Mod: CPTII,S$GLB,, | Performed by: PHYSICIAN ASSISTANT

## 2022-08-25 PROCEDURE — 4010F PR ACE/ARB THEARPY RXD/TAKEN: ICD-10-PCS | Mod: CPTII,S$GLB,, | Performed by: PHYSICIAN ASSISTANT

## 2022-08-25 PROCEDURE — 3075F SYST BP GE 130 - 139MM HG: CPT | Mod: CPTII,S$GLB,, | Performed by: PHYSICIAN ASSISTANT

## 2022-08-25 PROCEDURE — 3046F PR MOST RECENT HEMOGLOBIN A1C LEVEL > 9.0%: ICD-10-PCS | Mod: CPTII,S$GLB,, | Performed by: PHYSICIAN ASSISTANT

## 2022-08-25 PROCEDURE — 3078F PR MOST RECENT DIASTOLIC BLOOD PRESSURE < 80 MM HG: ICD-10-PCS | Mod: CPTII,S$GLB,, | Performed by: PHYSICIAN ASSISTANT

## 2022-08-25 PROCEDURE — 99999 PR PBB SHADOW E&M-EST. PATIENT-LVL V: CPT | Mod: PBBFAC,,, | Performed by: PHYSICIAN ASSISTANT

## 2022-08-25 PROCEDURE — 1111F DSCHRG MED/CURRENT MED MERGE: CPT | Mod: CPTII,S$GLB,, | Performed by: PHYSICIAN ASSISTANT

## 2022-08-25 PROCEDURE — 71046 X-RAY EXAM CHEST 2 VIEWS: CPT | Mod: TC

## 2022-08-25 PROCEDURE — 1101F PT FALLS ASSESS-DOCD LE1/YR: CPT | Mod: CPTII,S$GLB,, | Performed by: PHYSICIAN ASSISTANT

## 2022-08-25 PROCEDURE — 3046F HEMOGLOBIN A1C LEVEL >9.0%: CPT | Mod: CPTII,S$GLB,, | Performed by: PHYSICIAN ASSISTANT

## 2022-08-25 PROCEDURE — 3288F PR FALLS RISK ASSESSMENT DOCUMENTED: ICD-10-PCS | Mod: CPTII,S$GLB,, | Performed by: PHYSICIAN ASSISTANT

## 2022-08-25 PROCEDURE — 71046 X-RAY EXAM CHEST 2 VIEWS: CPT | Mod: 26,,, | Performed by: RADIOLOGY

## 2022-08-25 PROCEDURE — 3066F NEPHROPATHY DOC TX: CPT | Mod: CPTII,S$GLB,, | Performed by: PHYSICIAN ASSISTANT

## 2022-08-25 PROCEDURE — 3066F PR DOCUMENTATION OF TREATMENT FOR NEPHROPATHY: ICD-10-PCS | Mod: CPTII,S$GLB,, | Performed by: PHYSICIAN ASSISTANT

## 2022-08-25 RX ORDER — LEVALBUTEROL TARTRATE 45 UG/1
1-2 AEROSOL, METERED ORAL EVERY 4 HOURS PRN
Qty: 15 G | Refills: 0 | Status: SHIPPED | OUTPATIENT
Start: 2022-08-25 | End: 2022-08-25

## 2022-08-25 RX ORDER — ALBUTEROL SULFATE 90 UG/1
2 AEROSOL, METERED RESPIRATORY (INHALATION) EVERY 6 HOURS PRN
Qty: 8 G | Refills: 0 | Status: SHIPPED | OUTPATIENT
Start: 2022-08-25 | End: 2022-10-04

## 2022-08-25 RX ORDER — AZELASTINE 1 MG/ML
1 SPRAY, METERED NASAL 2 TIMES DAILY
Qty: 30 ML | Refills: 0 | Status: SHIPPED | OUTPATIENT
Start: 2022-08-25 | End: 2023-01-17 | Stop reason: SDUPTHER

## 2022-08-25 RX ORDER — AZELASTINE 1 MG/ML
1 SPRAY, METERED NASAL 2 TIMES DAILY
Qty: 30 ML | Refills: 0 | Status: SHIPPED | OUTPATIENT
Start: 2022-08-25 | End: 2022-08-25 | Stop reason: SDUPTHER

## 2022-08-25 RX ORDER — LEVALBUTEROL TARTRATE 45 UG/1
1-2 AEROSOL, METERED ORAL EVERY 4 HOURS PRN
Qty: 15 G | Refills: 0 | Status: SHIPPED | OUTPATIENT
Start: 2022-08-25 | End: 2022-08-25 | Stop reason: SDUPTHER

## 2022-08-25 NOTE — TELEPHONE ENCOUNTER
----- Message from Abby Lieberman sent at 8/25/2022 10:54 AM CDT -----  Regarding: meds  Contact: daughter  Pharmacy needs more information to fill her prescription. And for the Azelastine, [facility] do not have this medication. She would like the prescriptions to be moved to [facility] pharmacy, either one.   Elizabeth Monzon at [phone]

## 2022-08-25 NOTE — PROGRESS NOTES
"Subjective:      Patient ID: Avril Monzon is a 77 y.o. female.    Chief Complaint: Follow-up (After cough)    Patient is new to me, being seen today for cough.  COVID + 8/3.  Repots dry cough since that time.  Seemed to be getting better but then restarted once she completed steroids.  Shortness of breath with even mild exertion (walking down the [place])   Treatment includes: tessalon, astelin    Was admitted during COVID illness, she did receive infusion therapy, steroids on discharge    Previously with , ending soon    [facility] visit next week, PCP visit October     Weight 127lb today, 125lb at Card visit last week    Last visit July 2021 with Mallory Carrera NP.     Review of Systems   Constitutional: Negative for chills, diaphoresis and fever.   HENT: Positive for postnasal drip and rhinorrhea. Negative for congestion and sore throat.    Respiratory: Positive for cough and shortness of breath. Negative for wheezing.    Gastrointestinal: Negative for abdominal pain, constipation, diarrhea, nausea and vomiting.   Genitourinary: Negative for dysuria, frequency and hematuria.        Urine odor   Skin: Negative for rash.   Neurological: Negative for dizziness, light-headedness and headaches.       Objective:   /60 (BP Location: Right arm, Patient Position: Sitting, BP Method: Medium (Manual))   Pulse 81   Temp 98.1 °F (36.7 °C) (Tympanic)   Resp 16   Ht 5' 5" (1.651 m)   Wt 58 kg (127 lb 13.9 oz)   LMP  (LMP Unknown)   SpO2 (!) 94%   BMI 21.28 kg/m²   Physical Exam  Constitutional:       General: She is not in acute distress.     Appearance: Normal appearance. She is well-developed. She is not ill-appearing.   HENT:      Head: Normocephalic and atraumatic.      Right Ear: Hearing, tympanic membrane, ear canal and external ear normal.      Left Ear: Hearing, tympanic membrane, ear canal and external ear normal.      Nose: Rhinorrhea present.      Mouth/Throat:      Mouth: Mucous membranes are " moist.      Pharynx: Oropharynx is clear.   Cardiovascular:      Rate and Rhythm: Normal rate and regular rhythm.      Heart sounds: Normal heart sounds.   Pulmonary:      Effort: Pulmonary effort is normal. No respiratory distress.      Breath sounds: Normal breath sounds. No decreased breath sounds.   Skin:     General: Skin is warm and dry.      Findings: No rash.   Psychiatric:         Speech: Speech normal.         Behavior: Behavior normal.         Thought Content: Thought content normal.       Assessment:      1. Dry cough    2. Shortness of breath    3. History of COVID-19    4. Allergic rhinitis, unspecified seasonality, unspecified trigger    5. Post-nasal drip    6. Abnormal urine odor    7. Primary hypertension    8. ESRD (end stage renal disease)       Plan:   Dry cough  -     Ambulatory referral/consult to Pulmonology; Future; Expected date: 09/01/2022  -     X-Ray Chest PA And Lateral; Future; Expected date: 08/25/2022    Shortness of breath  -     Ambulatory referral/consult to Pulmonology; Future; Expected date: 09/01/2022  -     X-Ray Chest PA And Lateral; Future; Expected date: 08/25/2022  -     levalbuterol (XOPENEX HFA) 45 mcg/actuation inhaler; Inhale 1-2 puffs into the lungs every 4 (four) hours as needed for Wheezing. Rescue  Dispense: 15 g; Refill: 0    History of COVID-19  -     Ambulatory referral/consult to Pulmonology; Future; Expected date: 09/01/2022    Allergic rhinitis, unspecified seasonality, unspecified trigger  -     azelastine (ASTELIN) 137 mcg (0.1 %) nasal spray; 1 spray (137 mcg total) by Nasal route 2 (two) times daily.  Dispense: 30 mL; Refill: 0    Post-nasal drip  -     azelastine (ASTELIN) 137 mcg (0.1 %) nasal spray; 1 spray (137 mcg total) by Nasal route 2 (two) times daily.  Dispense: 30 mL; Refill: 0    Abnormal urine odor  -     Urinalysis; Future; Expected date: 08/25/2022  -     Urine culture; Future; Expected date: 08/25/2022    Primary hypertension   Controlled      ESRD (end stage renal disease)   On dialysis     Start astelin for PND, runny nose     Will trial inhaler, f/u Pulm to be scheduled     [facility] appt next week, will try to arrange sooner f/u with PCP office     Discussed worsening signs/symptoms and when to return to clinic or go to ED.   Patient expresses understanding and agrees with treatment plan.

## 2022-08-25 NOTE — PROGRESS NOTES
Chart review: Patient had clinic appointment this morning. Attempted to reach patient to complete social assessment and follow up on referral to [facility]. Unable to reach patient. Collaborated with MARIYA RoeM RN. Patient to be transferred to Maritza Palmer LCSW, since current  is retiring. Message sent to [facility] for handoff.

## 2022-08-26 ENCOUNTER — HOSPITAL ENCOUNTER (EMERGENCY)
Facility: HOSPITAL | Age: 77
Discharge: HOME | End: 2022-08-26
Attending: EMERGENCY MEDICINE
Payer: MEDICARE

## 2022-08-26 VITALS
HEART RATE: 82 BPM | OXYGEN SATURATION: 95 % | SYSTOLIC BLOOD PRESSURE: 162 MMHG | BODY MASS INDEX: 21.63 KG/M2 | DIASTOLIC BLOOD PRESSURE: 72 MMHG | TEMPERATURE: 98 F | RESPIRATION RATE: 18 BRPM | WEIGHT: 130 LBS

## 2022-08-26 DIAGNOSIS — Z86.16 HISTORY OF COVID-19: ICD-10-CM

## 2022-08-26 DIAGNOSIS — R05.3 CHRONIC COUGH: ICD-10-CM

## 2022-08-26 DIAGNOSIS — N30.00 ACUTE CYSTITIS WITHOUT HEMATURIA: Primary | ICD-10-CM

## 2022-08-26 DIAGNOSIS — Z99.2 ESRD ON DIALYSIS: ICD-10-CM

## 2022-08-26 DIAGNOSIS — N18.6 ESRD ON DIALYSIS: ICD-10-CM

## 2022-08-26 LAB
ALBUMIN SERPL BCP-MCNC: 3 G/DL (ref 3.5–5.2)
ALP SERPL-CCNC: 89 U/L (ref 55–135)
ALT SERPL W/O P-5'-P-CCNC: 12 U/L (ref 10–44)
ANION GAP SERPL CALC-SCNC: 12 MMOL/L (ref 8–16)
AST SERPL-CCNC: 18 U/L (ref 10–40)
BACTERIA #/AREA URNS HPF: ABNORMAL /HPF
BASOPHILS # BLD AUTO: 0.03 K/UL (ref 0–0.2)
BASOPHILS NFR BLD: 0.3 % (ref 0–1.9)
BILIRUB SERPL-MCNC: 0.7 MG/DL (ref 0.1–1)
BILIRUB UR QL STRIP: NEGATIVE
BUN SERPL-MCNC: 36 MG/DL (ref 8–23)
CALCIUM SERPL-MCNC: 9.2 MG/DL (ref 8.7–10.5)
CHLORIDE SERPL-SCNC: 99 MMOL/L (ref 95–110)
CLARITY UR: ABNORMAL
CO2 SERPL-SCNC: 23 MMOL/L (ref 23–29)
COLOR UR: YELLOW
CREAT SERPL-MCNC: 5 MG/DL (ref 0.5–1.4)
DIFFERENTIAL METHOD: ABNORMAL
EOSINOPHIL # BLD AUTO: 0.1 K/UL (ref 0–0.5)
EOSINOPHIL NFR BLD: 1.3 % (ref 0–8)
ERYTHROCYTE [DISTWIDTH] IN BLOOD BY AUTOMATED COUNT: 19.5 % (ref 11.5–14.5)
EST. GFR  (NO RACE VARIABLE): 9 ML/MIN/1.73 M^2
GLUCOSE SERPL-MCNC: 213 MG/DL (ref 70–110)
GLUCOSE UR QL STRIP: NEGATIVE
HCT VFR BLD AUTO: 23.8 % (ref 37–48.5)
HGB BLD-MCNC: 7.5 G/DL (ref 12–16)
HGB UR QL STRIP: ABNORMAL
HYALINE CASTS #/AREA URNS LPF: 15 /LPF
IMM GRANULOCYTES # BLD AUTO: 0.03 K/UL (ref 0–0.04)
IMM GRANULOCYTES NFR BLD AUTO: 0.3 % (ref 0–0.5)
KETONES UR QL STRIP: ABNORMAL
LEUKOCYTE ESTERASE UR QL STRIP: ABNORMAL
LYMPHOCYTES # BLD AUTO: 0.8 K/UL (ref 1–4.8)
LYMPHOCYTES NFR BLD: 9 % (ref 18–48)
MCH RBC QN AUTO: 31 PG (ref 27–31)
MCHC RBC AUTO-ENTMCNC: 31.5 G/DL (ref 32–36)
MCV RBC AUTO: 98 FL (ref 82–98)
MICROSCOPIC COMMENT: ABNORMAL
MONOCYTES # BLD AUTO: 0.4 K/UL (ref 0.3–1)
MONOCYTES NFR BLD: 4.7 % (ref 4–15)
NEUTROPHILS # BLD AUTO: 7.9 K/UL (ref 1.8–7.7)
NEUTROPHILS NFR BLD: 84.4 % (ref 38–73)
NITRITE UR QL STRIP: NEGATIVE
NRBC BLD-RTO: 0 /100 WBC
PH UR STRIP: 6 [PH] (ref 5–8)
PLATELET # BLD AUTO: 182 K/UL (ref 150–450)
PMV BLD AUTO: 9.1 FL (ref 9.2–12.9)
POTASSIUM SERPL-SCNC: 4.2 MMOL/L (ref 3.5–5.1)
PROT SERPL-MCNC: 6.7 G/DL (ref 6–8.4)
PROT UR QL STRIP: ABNORMAL
RBC # BLD AUTO: 2.42 M/UL (ref 4–5.4)
RBC #/AREA URNS HPF: 41 /HPF (ref 0–4)
SODIUM SERPL-SCNC: 134 MMOL/L (ref 136–145)
SP GR UR STRIP: 1.01 (ref 1–1.03)
SQUAMOUS #/AREA URNS HPF: 2 /HPF
URN SPEC COLLECT METH UR: ABNORMAL
UROBILINOGEN UR STRIP-ACNC: ABNORMAL EU/DL
WBC # BLD AUTO: 9.37 K/UL (ref 3.9–12.7)
WBC #/AREA URNS HPF: >100 /HPF (ref 0–5)
WBC CLUMPS URNS QL MICRO: ABNORMAL

## 2022-08-26 PROCEDURE — 81000 URINALYSIS NONAUTO W/SCOPE: CPT | Performed by: EMERGENCY MEDICINE

## 2022-08-26 PROCEDURE — 87086 URINE CULTURE/COLONY COUNT: CPT | Performed by: EMERGENCY MEDICINE

## 2022-08-26 PROCEDURE — 63600175 PHARM REV CODE 636 W HCPCS: Performed by: EMERGENCY MEDICINE

## 2022-08-26 PROCEDURE — 87186 SC STD MICRODIL/AGAR DIL: CPT | Performed by: EMERGENCY MEDICINE

## 2022-08-26 PROCEDURE — 85025 COMPLETE CBC W/AUTO DIFF WBC: CPT | Performed by: EMERGENCY MEDICINE

## 2022-08-26 PROCEDURE — 87088 URINE BACTERIA CULTURE: CPT | Performed by: EMERGENCY MEDICINE

## 2022-08-26 PROCEDURE — 99284 EMERGENCY DEPT VISIT MOD MDM: CPT | Mod: 25

## 2022-08-26 PROCEDURE — 87077 CULTURE AEROBIC IDENTIFY: CPT | Performed by: EMERGENCY MEDICINE

## 2022-08-26 PROCEDURE — 80053 COMPREHEN METABOLIC PANEL: CPT | Performed by: EMERGENCY MEDICINE

## 2022-08-26 PROCEDURE — 96365 THER/PROPH/DIAG IV INF INIT: CPT

## 2022-08-26 RX ORDER — BENZONATATE 200 MG/1
200 CAPSULE ORAL 3 TIMES DAILY PRN
Qty: 30 CAPSULE | Refills: 0 | Status: SHIPPED | OUTPATIENT
Start: 2022-08-26 | End: 2022-09-05

## 2022-08-26 RX ORDER — CEFUROXIME AXETIL 500 MG/1
500 TABLET ORAL 2 TIMES DAILY
Qty: 14 TABLET | Refills: 0 | Status: SHIPPED | OUTPATIENT
Start: 2022-08-26 | End: 2022-09-01

## 2022-08-26 RX ADMIN — CEFTRIAXONE 1 G: 1 INJECTION, SOLUTION INTRAVENOUS at 01:08

## 2022-08-26 NOTE — TELEPHONE ENCOUNTER
Attempted to call regarding CXR results, no answer.    Per chart review, patient is currently in [facility].

## 2022-08-26 NOTE — ED PROVIDER NOTES
SCRIBE #1 NOTE: I, Jeromy Mendiola, am scribing for, and in the presence of, Lizzette Holder MD. I have scribed the entire note.      History      Chief Complaint   Patient presents with    Fatigue     Has bypass surgery end of July. Got covid first week of august. Has been fatigued ever since with cough.        Review of patient's allergies indicates:   Allergen Reactions    Codeine Swelling    Darvon [propoxyphene] Swelling    Atorvastatin      Muscle twitching        HPI   HPI    8/26/2022, 11:44 AM   History obtained from the patient      History of Present Illness: Avril Monzon is a 77 y.o. female patient with a PMHx of CAD, HTN, NSTEMI, ESRD who presents to the Emergency Department for generalized weakness, onset 3 weeks PTA. Pt had a CABG on 7/20/22, and was dx with COVID-19 shortly after the procedure. Pt followed with her PCP yesterday and had a CXR done, which was negative. Symptoms are constant and moderate in severity. No mitigating or exacerbating factors reported. Associated sxs include cough, fatigue, and exertional SOB. Patient denies any fever, chills, n/v/d, CP, numbness, dizziness, headache, and all other sxs at this time. Pt dialyzes every M/W/F, but has not yet dialyzed today (Friday). Pt still produces urine. No further complaints or concerns at this time.     Arrival mode: Personal vehicle    PCP: Rose Parks MD       Past Medical History:  Past Medical History:   Diagnosis Date    Acute hypoxemic respiratory failure 11/26/2018    Anemia     Arthritis     back, hand, knees    Back pain     Cataract     CKD stage G4/A3, GFR 15 - 29 and albumin creatinine ratio >300 mg/g     GFR 40% Jan 2014 and 33% ub 3/2014 ([facility])    Coronary artery disease involving native coronary artery of native heart 11/30/2018    Diabetic retinopathy     DM (diabetes mellitus) type II controlled, neurological manifestation     Exudative age-related macular degeneration of left eye with active choroidal  neovascularization 2/5/2019    GERD (gastroesophageal reflux disease)     Glaucoma     Heart failure     Hyperlipidemia     Hypertension     Non-ST elevation MI (NSTEMI) 11/28/2018    NSTEMI (non-ST elevated myocardial infarction) 11/27/2018    Peripheral neuropathy     Polyneuropathy     Proteinuria     >6 mo     Seizures     [facility] 1/2014 -dick CT NRI showed small vessel    Stroke 2012,2014    Tobacco dependence     Type 2 diabetes with peripheral circulatory disorder, controlled        Past Surgical History:  Past Surgical History:   Procedure Laterality Date    BREAST LUMPECTOMY Left     benign, when patient was 15 years old    BREAST MASS EXCISION      ,benign, age 15.    CATHETERIZATION OF BOTH LEFT AND RIGHT HEART N/A 11/28/2018    Procedure: CATHETERIZATION, HEART, BOTH LEFT AND RIGHT;  Surgeon: Michelle Holman MD;  Location: [facility] CATH LAB;  Service: Cardiology;  Laterality: N/A;    CATHETERIZATION OF BOTH LEFT AND RIGHT HEART N/A 11/30/2018    Procedure: CATHETERIZATION, HEART, BOTH LEFT AND RIGHT;  Surgeon: Michelle Holman MD;  Location: [facility] CATH LAB;  Service: Cardiology;  Laterality: N/A;    CORONARY ARTERY BYPASS GRAFT      CORONARY ARTERY BYPASS GRAFT (CABG) N/A 7/20/2022    Procedure: CORONARY ARTERY BYPASS GRAFT (CABG);  Surgeon: Sanju Locke MD;  Location: [facility] OR;  Service: Cardiothoracic;  Laterality: N/A;  2-VESSEL PUMP ASSIST WITH EPI-AORTIC ULTRASOUND    ENDOSCOPIC HARVEST OF VEIN Left 7/20/2022    Procedure: SURGICAL PROCUREMENT, VEIN, ENDOSCOPIC;  Surgeon: Sanju Locke MD;  Location: [facility] OR;  Service: Cardiothoracic;  Laterality: Left;    HYSTERECTOMY  1982    INJECTION OF ANESTHETIC AGENT AROUND MULTIPLE INTERCOSTAL NERVES N/A 7/20/2022    Procedure: BLOCK, NERVE, INTERCOSTAL, 2 OR MORE;  Surgeon: Sanju Locke MD;  Location: [facility] OR;  Service: Cardiothoracic;  Laterality: N/A;  PARASTERNAL NERVE BLOCK    INSERTION OF SHUNT      LEFT HEART CATHETERIZATION  Left 12/3/2018    Procedure: CATHETERIZATION, HEART, LEFT;  Surgeon: Michelle Holman MD;  Location: [facility] CATH LAB;  Service: Cardiology;  Laterality: Left;  LAD ATHERECTOMY STENT/ FEMORAL APPROACH    LEFT HEART CATHETERIZATION Left 2022    Procedure: CATHETERIZATION, HEART, LEFT;  Surgeon: Abhi Sloan MD;  Location: [facility] CATH LAB;  Service: Cardiology;  Laterality: Left;    OOPHORECTOMY      wrist cyst Right 1980s    dorsal wrist cyst         Family History:  Family History   Problem Relation Age of Onset    Diabetes Mother     Hyperlipidemia Mother     Hypertension Mother     Heart disease Mother         MI    Muscular dystrophy Son     Cancer Maternal Grandmother         colon       Social History:  Social History     Tobacco Use    Smoking status: Former Smoker     Packs/day: 1.50     Years: 30.00     Pack years: 45.00     Types: Cigarettes     Quit date: 1990     Years since quittin.6    Smokeless tobacco: Former User   Substance and Sexual Activity    Alcohol use: No     Alcohol/week: 0.0 standard drinks    Drug use: No    Sexual activity: Not Currently       ROS   Review of Systems   Constitutional: Positive for fatigue. Negative for chills and fever.   HENT: Negative for sore throat.    Respiratory: Positive for cough and shortness of breath (exertional).    Cardiovascular: Negative for chest pain.   Gastrointestinal: Negative for diarrhea, nausea and vomiting.   Genitourinary: Negative for dysuria.   Musculoskeletal: Negative for back pain.   Skin: Negative for rash.   Neurological: Positive for weakness (generalized). Negative for dizziness, light-headedness, numbness and headaches.   Hematological: Does not bruise/bleed easily.   All other systems reviewed and are negative.    Physical Exam      Initial Vitals [22 1128]   BP Pulse Resp Temp SpO2   (!) 163/78 81 18 98.8 °F (37.1 °C) 95 %      MAP       --          Physical Exam  Nursing Notes and Vital Signs  Reviewed.  Constitutional: Patient is in no acute distress. Well-developed and well-nourished. Dry cough noted.  Head: Atraumatic. Normocephalic.  Eyes: PERRL. EOM intact. Conjunctivae are not pale. No scleral icterus.  ENT: Mucous membranes are moist. Oropharynx is clear and symmetric.    Neck: Supple. Full ROM.   Cardiovascular: Regular rate. Regular rhythm. No murmurs, rubs, or gallops. Distal pulses are 2+ and symmetric.  Pulmonary/Chest: No respiratory distress. Clear to auscultation bilaterally. No wheezing or rales.  Abdominal: Soft and non-distended.  There is no tenderness.  No rebound, guarding, or rigidity.   Musculoskeletal: Moves all extremities. No obvious deformities. No edema.  Skin: Warm and dry.  Neurological:  Alert, awake, and appropriate.  Normal speech.  No acute focal neurological deficits are appreciated.  Psychiatric: Normal affect. Good eye contact. Appropriate in content.    ED Course    Procedures  ED Vital Signs:  Vitals:    08/26/22 1128 08/26/22 1354 08/26/22 1426   BP: (!) 163/78 (!) 162/72    Pulse: 81 82    Resp: 18 18    Temp: 98.8 °F (37.1 °C) 98 °F (36.7 °C)    TempSrc: Oral     SpO2: 95% 95%    Weight:   59 kg (130 lb)       Abnormal Lab Results:  Labs Reviewed   CBC W/ AUTO DIFFERENTIAL - Abnormal; Notable for the following components:       Result Value    RBC 2.42 (*)     Hemoglobin 7.5 (*)     Hematocrit 23.8 (*)     MCHC 31.5 (*)     RDW 19.5 (*)     MPV 9.1 (*)     Gran # (ANC) 7.9 (*)     Lymph # 0.8 (*)     Gran % 84.4 (*)     Lymph % 9.0 (*)     All other components within normal limits   COMPREHENSIVE METABOLIC PANEL - Abnormal; Notable for the following components:    Sodium 134 (*)     Glucose 213 (*)     BUN 36 (*)     Creatinine 5.0 (*)     Albumin 3.0 (*)     eGFR 9 (*)     All other components within normal limits   URINALYSIS, REFLEX TO URINE CULTURE - Abnormal; Notable for the following components:    Appearance, UA Cloudy (*)     Protein, UA 3+ (*)      Ketones, UA Trace (*)     Occult Blood UA 1+ (*)     Urobilinogen, UA 2.0-3.0 (*)     Leukocytes, UA 3+ (*)     All other components within normal limits    Narrative:     Specimen Source->Urine   URINALYSIS MICROSCOPIC - Abnormal; Notable for the following components:    RBC, UA 41 (*)     WBC, UA >100 (*)     WBC Clumps, UA Many (*)     Bacteria Many (*)     Hyaline Casts, UA 15 (*)     All other components within normal limits    Narrative:     Specimen Source->Urine   CULTURE, URINE        All Lab Results:  Results for orders placed or performed during the hospital encounter of 08/26/22   CBC auto differential   Result Value Ref Range    WBC 9.37 3.90 - 12.70 K/uL    RBC 2.42 (L) 4.00 - 5.40 M/uL    Hemoglobin 7.5 (L) 12.0 - 16.0 g/dL    Hematocrit 23.8 (L) 37.0 - 48.5 %    MCV 98 82 - 98 fL    MCH 31.0 27.0 - 31.0 pg    MCHC 31.5 (L) 32.0 - 36.0 g/dL    RDW 19.5 (H) 11.5 - 14.5 %    Platelets 182 150 - 450 K/uL    MPV 9.1 (L) 9.2 - 12.9 fL    Immature Granulocytes 0.3 0.0 - 0.5 %    Gran # (ANC) 7.9 (H) 1.8 - 7.7 K/uL    Immature Grans (Abs) 0.03 0.00 - 0.04 K/uL    Lymph # 0.8 (L) 1.0 - 4.8 K/uL    Mono # 0.4 0.3 - 1.0 K/uL    Eos # 0.1 0.0 - 0.5 K/uL    Baso # 0.03 0.00 - 0.20 K/uL    nRBC 0 0 /100 WBC    Gran % 84.4 (H) 38.0 - 73.0 %    Lymph % 9.0 (L) 18.0 - 48.0 %    Mono % 4.7 4.0 - 15.0 %    Eosinophil % 1.3 0.0 - 8.0 %    Basophil % 0.3 0.0 - 1.9 %    Differential Method Automated    Comprehensive metabolic panel   Result Value Ref Range    Sodium 134 (L) 136 - 145 mmol/L    Potassium 4.2 3.5 - 5.1 mmol/L    Chloride 99 95 - 110 mmol/L    CO2 23 23 - 29 mmol/L    Glucose 213 (H) 70 - 110 mg/dL    BUN 36 (H) 8 - 23 mg/dL    Creatinine 5.0 (H) 0.5 - 1.4 mg/dL    Calcium 9.2 8.7 - 10.5 mg/dL    Total Protein 6.7 6.0 - 8.4 g/dL    Albumin 3.0 (L) 3.5 - 5.2 g/dL    Total Bilirubin 0.7 0.1 - 1.0 mg/dL    Alkaline Phosphatase 89 55 - 135 U/L    AST 18 10 - 40 U/L    ALT 12 10 - 44 U/L    Anion Gap 12 8 - 16  mmol/L    eGFR 9 (A) >60 mL/min/1.73 m^2   Urinalysis, Reflex to Urine Culture Urine, Catheterized    Specimen: Urine   Result Value Ref Range    Specimen UA Urine, Catheterized     Color, UA Yellow Yellow, Straw, Hanane    Appearance, UA Cloudy (A) Clear    pH, UA 6.0 5.0 - 8.0    Specific Gravity, UA 1.015 1.005 - 1.030    Protein, UA 3+ (A) Negative    Glucose, UA Negative Negative    Ketones, UA Trace (A) Negative    Bilirubin (UA) Negative Negative    Occult Blood UA 1+ (A) Negative    Nitrite, UA Negative Negative    Urobilinogen, UA 2.0-3.0 (A) <2.0 EU/dL    Leukocytes, UA 3+ (A) Negative   Urinalysis Microscopic   Result Value Ref Range    RBC, UA 41 (H) 0 - 4 /hpf    WBC, UA >100 (H) 0 - 5 /hpf    WBC Clumps, UA Many (A) None-Rare    Bacteria Many (A) None-Occ /hpf    Squam Epithel, UA 2 /hpf    Hyaline Casts, UA 15 (A) 0-1/lpf /lpf    Microscopic Comment SEE COMMENT      *Note: Due to a large number of results and/or encounters for the requested time period, some results have not been displayed. A complete set of results can be found in Results Review.     Imaging Results:  Imaging Results    None        Outpatient Imaging Reviewed:    Contains abnormal data X-Ray Chest PA And Lateral  Order: [id number]   Status: Final result     Visible to patient: No (inaccessible in Patient Portal)     Next appt: 08/30/2022 at 09:30 AM in Ophthalmology (Zi Coello MD)     Dx: Shortness of breath; Dry cough     0 Result Notes    Details    Reading Physician Reading Date Result Priority   Tevin Kaur MD  [phone] 8/25/2022 Routine     Narrative & Impression  EXAMINATION:  XR CHEST PA AND LATERAL     CLINICAL HISTORY:  Shortness of breath     TECHNIQUE:  PA and lateral views of the chest were performed.     COMPARISON:  08/07/2022     FINDINGS:  Cardiac silhouette remains enlarged.  Prior sternotomy again noted.    Pulmonary vasculature remains diffusely congested in appearance with suspected mild  interstitial edema which appears similar to prior.  There are small to moderate bilateral pleural effusions which may be slightly increased in size from prior.  No acute osseous findings demonstrated.     Impression:     As above     This report was flagged in Epic as abnormal.        Electronically signed by: Tevin Kaur MD  Date:                                            08/25/2022  Time:                                           09:10             Exam Ended: 08/25/22 09:03 Last Resulted: 08/25/22 09:10                    The Emergency Provider reviewed the vital signs and test results, which are outlined above.    ED Discussion     1:22 PM: Reassessed pt at this time. Discussed with pt all pertinent ED information and results. Discussed pt dx and plan of tx. Gave pt all f/u and return to the ED instructions. All questions and concerns were addressed at this time. Pt expresses understanding of information and instructions, and is comfortable with plan to discharge. Pt is stable for discharge.    I discussed with patient and/or family/caretaker that evaluation in the ED does not suggest any emergent or life threatening medical conditions requiring immediate intervention beyond what was provided in the ED, and I believe patient is safe for discharge.  Regardless, an unremarkable evaluation in the ED does not preclude the development or presence of a serious of life threatening condition. As such, patient was instructed to return immediately for any worsening or change in current symptoms.         ED Medication(s):  Medications   cefTRIAXone (ROCEPHIN) 1 g/50 mL D5W IVPB (0 g Intravenous Stopped 8/26/22 1441)        Follow-up Information     Rose Parks MD. Schedule an appointment as soon as possible for a visit in 2 days.    Specialty: Internal Medicine  Why: Return to the Emergency Room, If symptoms worsen  Contact information:  [address] [place] [id number] [phone]                         Discharge Medication List as of 8/26/2022  1:13 PM      START taking these medications    Details   benzonatate (TESSALON) 200 MG capsule Take 1 capsule (200 mg total) by mouth 3 (three) times daily as needed for Cough., Starting Fri 8/26/2022, Until Mon 9/5/2022 at 2359, Normal      cefUROXime (CEFTIN) 500 MG tablet Take 1 tablet (500 mg total) by mouth 2 (two) times daily. for 7 days, Starting Fri 8/26/2022, Until Fri 9/2/2022, Normal               Medical Decision Making    Medical Decision Making:   Clinical Tests:   Lab Tests: Ordered and Reviewed  Radiological Study: Reviewed           Scribe Attestation:   Scribe #1: I performed the above scribed service and the documentation accurately describes the services I performed. I attest to the accuracy of the note.    Attending:   Physician Attestation Statement for Scribe #1: I, Lizzette Holder MD, personally performed the services described in this documentation, as scribed by Jeromy Mendiola, in my presence, and it is both accurate and complete.          Clinical Impression       ICD-10-CM ICD-9-CM   1. Acute cystitis without hematuria  N30.00 595.0   2. Chronic cough  R05.3 786.2   3. History of COVID-19  Z86.16 V12.09   4. ESRD on dialysis  N18.6 585.6    Z99.2 V45.11       Disposition:   Disposition: Discharged  Condition: Stable         Lizzette Hloder MD  08/26/22 1622

## 2022-08-28 LAB — BACTERIA UR CULT: ABNORMAL

## 2022-08-29 ENCOUNTER — OUTPATIENT CASE MANAGEMENT (OUTPATIENT)
Dept: ADMINISTRATIVE | Facility: OTHER | Age: 77
End: 2022-08-29
Payer: MEDICARE

## 2022-08-30 ENCOUNTER — OFFICE VISIT (OUTPATIENT)
Dept: OPHTHALMOLOGY | Facility: CLINIC | Age: 77
End: 2022-08-30
Payer: MEDICARE

## 2022-08-30 ENCOUNTER — HOSPITAL ENCOUNTER (OUTPATIENT)
Dept: CARDIOLOGY | Facility: HOSPITAL | Age: 77
Discharge: HOME | End: 2022-08-30
Attending: THORACIC SURGERY (CARDIOTHORACIC VASCULAR SURGERY)
Payer: MEDICARE

## 2022-08-30 ENCOUNTER — OFFICE VISIT (OUTPATIENT)
Dept: DIABETES | Facility: CLINIC | Age: 77
End: 2022-08-30
Payer: MEDICARE

## 2022-08-30 ENCOUNTER — CLINICAL SUPPORT (OUTPATIENT)
Dept: DIABETES | Facility: CLINIC | Age: 77
End: 2022-08-30
Payer: MEDICARE

## 2022-08-30 VITALS
WEIGHT: 125 LBS | DIASTOLIC BLOOD PRESSURE: 55 MMHG | BODY MASS INDEX: 20.83 KG/M2 | HEIGHT: 65 IN | SYSTOLIC BLOOD PRESSURE: 105 MMHG | HEART RATE: 60 BPM

## 2022-08-30 DIAGNOSIS — Z79.4 TYPE 2 DIABETES MELLITUS WITH LEFT EYE AFFECTED BY MODERATE NONPROLIFERATIVE RETINOPATHY AND MACULAR EDEMA, WITH LONG-TERM CURRENT USE OF INSULIN: Primary | ICD-10-CM

## 2022-08-30 DIAGNOSIS — E11.59 HYPERTENSION ASSOCIATED WITH DIABETES: ICD-10-CM

## 2022-08-30 DIAGNOSIS — Z86.73 HISTORY OF CVA (CEREBROVASCULAR ACCIDENT): ICD-10-CM

## 2022-08-30 DIAGNOSIS — Z79.4 TYPE 2 DIABETES MELLITUS WITH DIABETIC PERIPHERAL ANGIOPATHY WITHOUT GANGRENE, WITH LONG-TERM CURRENT USE OF INSULIN: ICD-10-CM

## 2022-08-30 DIAGNOSIS — E11.69 HYPERLIPIDEMIA ASSOCIATED WITH TYPE 2 DIABETES MELLITUS: ICD-10-CM

## 2022-08-30 DIAGNOSIS — E11.51 TYPE 2 DIABETES MELLITUS WITH DIABETIC PERIPHERAL ANGIOPATHY WITHOUT GANGRENE, WITH LONG-TERM CURRENT USE OF INSULIN: ICD-10-CM

## 2022-08-30 DIAGNOSIS — N18.6 ESRD (END STAGE RENAL DISEASE): ICD-10-CM

## 2022-08-30 DIAGNOSIS — E11.3553 TYPE 2 DIABETES MELLITUS WITH STABLE PROLIFERATIVE RETINOPATHY OF BOTH EYES, WITH LONG-TERM CURRENT USE OF INSULIN: Primary | ICD-10-CM

## 2022-08-30 DIAGNOSIS — H18.421 BAND KERATOPATHY OF RIGHT EYE: ICD-10-CM

## 2022-08-30 DIAGNOSIS — Z79.4 TYPE 2 DIABETES MELLITUS WITH STABLE PROLIFERATIVE RETINOPATHY OF BOTH EYES, WITH LONG-TERM CURRENT USE OF INSULIN: Primary | ICD-10-CM

## 2022-08-30 DIAGNOSIS — H25.21: ICD-10-CM

## 2022-08-30 DIAGNOSIS — Z95.1 S/P CABG (CORONARY ARTERY BYPASS GRAFT): ICD-10-CM

## 2022-08-30 DIAGNOSIS — I15.2 HYPERTENSION ASSOCIATED WITH DIABETES: ICD-10-CM

## 2022-08-30 DIAGNOSIS — E11.36 DIABETIC CATARACT: ICD-10-CM

## 2022-08-30 DIAGNOSIS — E78.5 HYPERLIPIDEMIA ASSOCIATED WITH TYPE 2 DIABETES MELLITUS: ICD-10-CM

## 2022-08-30 DIAGNOSIS — E11.3312 TYPE 2 DIABETES MELLITUS WITH LEFT EYE AFFECTED BY MODERATE NONPROLIFERATIVE RETINOPATHY AND MACULAR EDEMA, WITH LONG-TERM CURRENT USE OF INSULIN: Primary | ICD-10-CM

## 2022-08-30 LAB
GLUCOSE SERPL-MCNC: 145 MG/DL (ref 70–110)
OHS CV RIGHT ABI LOWER EXTREMITY (NO CALC): 0.9
RIGHT ANT TIBIAL SYS PSV: 76 CM/S
RIGHT CFA PSV: 115 CM/S
RIGHT PERONEAL SYS PSV: 69 CM/S
RIGHT POPLITEAL PSV: 62 CM/S
RIGHT POST TIBIAL SYS PSV: 0 CM/S
RIGHT PROFUNDA SYS PSV: 105 CM/S
RIGHT SUPER FEMORAL DIST SYS PSV: 67 CM/S
RIGHT SUPER FEMORAL MID SYS PSV: 103 CM/S
RIGHT SUPER FEMORAL OSTIAL SYS PSV: 74 CM/S
RIGHT SUPER FEMORAL PROX SYS PSV: 83 CM/S
RIGHT TIB/PER TRUNK SYS PSV: 62 CM/S

## 2022-08-30 PROCEDURE — 3074F PR MOST RECENT SYSTOLIC BLOOD PRESSURE < 130 MM HG: ICD-10-PCS | Mod: CPTII,S$GLB,, | Performed by: NURSE PRACTITIONER

## 2022-08-30 PROCEDURE — 3066F PR DOCUMENTATION OF TREATMENT FOR NEPHROPATHY: ICD-10-PCS | Mod: CPTII,S$GLB,, | Performed by: NURSE PRACTITIONER

## 2022-08-30 PROCEDURE — 1101F PT FALLS ASSESS-DOCD LE1/YR: CPT | Mod: CPTII,S$GLB,, | Performed by: NURSE PRACTITIONER

## 2022-08-30 PROCEDURE — 99999 PR PBB SHADOW E&M-EST. PATIENT-LVL III: ICD-10-PCS | Mod: PBBFAC,,, | Performed by: OPHTHALMOLOGY

## 2022-08-30 PROCEDURE — 93926 LOWER EXTREMITY STUDY: CPT | Mod: RT

## 2022-08-30 PROCEDURE — 1159F PR MEDICATION LIST DOCUMENTED IN MEDICAL RECORD: ICD-10-PCS | Mod: CPTII,S$GLB,, | Performed by: OPHTHALMOLOGY

## 2022-08-30 PROCEDURE — 3046F HEMOGLOBIN A1C LEVEL >9.0%: CPT | Mod: CPTII,S$GLB,, | Performed by: NURSE PRACTITIONER

## 2022-08-30 PROCEDURE — 3288F PR FALLS RISK ASSESSMENT DOCUMENTED: ICD-10-PCS | Mod: CPTII,S$GLB,, | Performed by: NURSE PRACTITIONER

## 2022-08-30 PROCEDURE — 3066F PR DOCUMENTATION OF TREATMENT FOR NEPHROPATHY: ICD-10-PCS | Mod: CPTII,S$GLB,, | Performed by: OPHTHALMOLOGY

## 2022-08-30 PROCEDURE — 1126F AMNT PAIN NOTED NONE PRSNT: CPT | Mod: CPTII,S$GLB,, | Performed by: OPHTHALMOLOGY

## 2022-08-30 PROCEDURE — 4010F ACE/ARB THERAPY RXD/TAKEN: CPT | Mod: CPTII,S$GLB,, | Performed by: NURSE PRACTITIONER

## 2022-08-30 PROCEDURE — 93926 CV US DOPPLER ARTERIAL LEG RIGHT (CUPID ONLY): ICD-10-PCS | Mod: 26,RT,, | Performed by: INTERNAL MEDICINE

## 2022-08-30 PROCEDURE — 93926 LOWER EXTREMITY STUDY: CPT | Mod: 26,RT,, | Performed by: INTERNAL MEDICINE

## 2022-08-30 PROCEDURE — 3288F FALL RISK ASSESSMENT DOCD: CPT | Mod: CPTII,S$GLB,, | Performed by: NURSE PRACTITIONER

## 2022-08-30 PROCEDURE — 82962 POCT GLUCOSE, HAND-HELD DEVICE: ICD-10-PCS | Mod: S$GLB,,, | Performed by: NURSE PRACTITIONER

## 2022-08-30 PROCEDURE — 3074F SYST BP LT 130 MM HG: CPT | Mod: CPTII,S$GLB,, | Performed by: NURSE PRACTITIONER

## 2022-08-30 PROCEDURE — 3078F PR MOST RECENT DIASTOLIC BLOOD PRESSURE < 80 MM HG: ICD-10-PCS | Mod: CPTII,S$GLB,, | Performed by: NURSE PRACTITIONER

## 2022-08-30 PROCEDURE — 4010F PR ACE/ARB THEARPY RXD/TAKEN: ICD-10-PCS | Mod: CPTII,S$GLB,, | Performed by: NURSE PRACTITIONER

## 2022-08-30 PROCEDURE — 1160F RVW MEDS BY RX/DR IN RCRD: CPT | Mod: CPTII,S$GLB,, | Performed by: OPHTHALMOLOGY

## 2022-08-30 PROCEDURE — 3078F DIAST BP <80 MM HG: CPT | Mod: CPTII,S$GLB,, | Performed by: NURSE PRACTITIONER

## 2022-08-30 PROCEDURE — 4010F PR ACE/ARB THEARPY RXD/TAKEN: ICD-10-PCS | Mod: CPTII,S$GLB,, | Performed by: OPHTHALMOLOGY

## 2022-08-30 PROCEDURE — 92014 PR EYE EXAM, EST PATIENT,COMPREHESV: ICD-10-PCS | Mod: S$GLB,,, | Performed by: OPHTHALMOLOGY

## 2022-08-30 PROCEDURE — 3046F PR MOST RECENT HEMOGLOBIN A1C LEVEL > 9.0%: ICD-10-PCS | Mod: CPTII,S$GLB,, | Performed by: OPHTHALMOLOGY

## 2022-08-30 PROCEDURE — 92134 POSTERIOR SEGMENT OCT RETINA (OCULAR COHERENCE TOMOGRAPHY)-BOTH EYES: ICD-10-PCS | Mod: S$GLB,,, | Performed by: OPHTHALMOLOGY

## 2022-08-30 PROCEDURE — 92134 CPTRZ OPH DX IMG PST SGM RTA: CPT | Mod: S$GLB,,, | Performed by: OPHTHALMOLOGY

## 2022-08-30 PROCEDURE — 1126F PR PAIN SEVERITY QUANTIFIED, NO PAIN PRESENT: ICD-10-PCS | Mod: CPTII,S$GLB,, | Performed by: OPHTHALMOLOGY

## 2022-08-30 PROCEDURE — 99999 PR PBB SHADOW E&M-EST. PATIENT-LVL V: ICD-10-PCS | Mod: PBBFAC,,, | Performed by: NURSE PRACTITIONER

## 2022-08-30 PROCEDURE — 3046F PR MOST RECENT HEMOGLOBIN A1C LEVEL > 9.0%: ICD-10-PCS | Mod: CPTII,S$GLB,, | Performed by: NURSE PRACTITIONER

## 2022-08-30 PROCEDURE — 92014 COMPRE OPH EXAM EST PT 1/>: CPT | Mod: S$GLB,,, | Performed by: OPHTHALMOLOGY

## 2022-08-30 PROCEDURE — 3066F NEPHROPATHY DOC TX: CPT | Mod: CPTII,S$GLB,, | Performed by: NURSE PRACTITIONER

## 2022-08-30 PROCEDURE — 1101F PR PT FALLS ASSESS DOC 0-1 FALLS W/OUT INJ PAST YR: ICD-10-PCS | Mod: CPTII,S$GLB,, | Performed by: NURSE PRACTITIONER

## 2022-08-30 PROCEDURE — 4010F ACE/ARB THERAPY RXD/TAKEN: CPT | Mod: CPTII,S$GLB,, | Performed by: OPHTHALMOLOGY

## 2022-08-30 PROCEDURE — 1160F PR REVIEW ALL MEDS BY PRESCRIBER/CLIN PHARMACIST DOCUMENTED: ICD-10-PCS | Mod: CPTII,S$GLB,, | Performed by: OPHTHALMOLOGY

## 2022-08-30 PROCEDURE — 1159F MED LIST DOCD IN RCRD: CPT | Mod: CPTII,S$GLB,, | Performed by: OPHTHALMOLOGY

## 2022-08-30 PROCEDURE — 3046F HEMOGLOBIN A1C LEVEL >9.0%: CPT | Mod: CPTII,S$GLB,, | Performed by: OPHTHALMOLOGY

## 2022-08-30 PROCEDURE — 99214 PR OFFICE/OUTPT VISIT, EST, LEVL IV, 30-39 MIN: ICD-10-PCS | Mod: S$GLB,,, | Performed by: NURSE PRACTITIONER

## 2022-08-30 PROCEDURE — 3066F NEPHROPATHY DOC TX: CPT | Mod: CPTII,S$GLB,, | Performed by: OPHTHALMOLOGY

## 2022-08-30 PROCEDURE — 99999 PR PBB SHADOW E&M-EST. PATIENT-LVL V: CPT | Mod: PBBFAC,,, | Performed by: NURSE PRACTITIONER

## 2022-08-30 PROCEDURE — 82962 GLUCOSE BLOOD TEST: CPT | Mod: S$GLB,,, | Performed by: NURSE PRACTITIONER

## 2022-08-30 PROCEDURE — 99214 OFFICE O/P EST MOD 30 MIN: CPT | Mod: S$GLB,,, | Performed by: NURSE PRACTITIONER

## 2022-08-30 PROCEDURE — 1126F AMNT PAIN NOTED NONE PRSNT: CPT | Mod: CPTII,S$GLB,, | Performed by: NURSE PRACTITIONER

## 2022-08-30 PROCEDURE — 99999 PR PBB SHADOW E&M-EST. PATIENT-LVL III: CPT | Mod: PBBFAC,,, | Performed by: OPHTHALMOLOGY

## 2022-08-30 PROCEDURE — 1126F PR PAIN SEVERITY QUANTIFIED, NO PAIN PRESENT: ICD-10-PCS | Mod: CPTII,S$GLB,, | Performed by: NURSE PRACTITIONER

## 2022-08-30 RX ORDER — LANCETS
1 EACH MISCELLANEOUS 3 TIMES DAILY
Qty: 100 EACH | Refills: 11 | Status: SHIPPED | OUTPATIENT
Start: 2022-08-30 | End: 2022-09-13 | Stop reason: ALTCHOICE

## 2022-08-30 NOTE — PROGRESS NOTES
Outpatient Care Management  Plan of Care Follow Up Visit    Patient: Avril Monzon  MRN: [id number]  Date of Service: 08/29/2022  Completed by: Debbie Lopez RN  Referral Date: 12/20/2021    Reason for Visit   Patient presents with    OPCM RN First Follow-Up Attempt    Update Plan Of Care       Brief Summary: Phone contact with Elizabeth sharma following Diab NP appt. She voiced understanding of instructions to monitor Mrs Mackenzie's blood sugar 3 X a day and return for follow up appts as listed on AVS. She also agreed to follow up appt from this CM in two weeks.     Patient Summary     Involvement of Care:  Do I have permission to speak with other family members about your care?       Patient Reported Labs & Vitals:  1.  Any Patient Reported Labs & Vitals?     2.  Patient Reported Blood Pressure:     3.  Patient Reported Pulse:     4.  Patient Reported Weight (Kg):     5.  Patient Reported Blood Glucose (mg/dl):       Medical and social history was reviewed with patient and/or caregiver.     Clinical Assessment     Reviewed and provided basic information on available community resources for mental health, transportation, wellness resources, and palliative care programs with patient and/or caregiver.     Complex Care Plan     Care plan was discussed and completed today with input from patient and/or caregiver.    Patient Instructions     Instructions were provided via the [facility] patient resources and are available for the patient to view on the patient portal.    Next Steps: Continue to educate about the importance of getting diabetes controlled. Debbie Lopez RN    Follow up in about 15 days (around 9/13/2022) for address care plan tasks.    [facility] OPCM Self-Management Care Plan was developed with the patients/caregivers input and was based on identified barriers from todays assessment.  Goals were written today with the patient/caregiver and the patient has agreed to work towards these goals to improve his/her  overall well-being. Patient verbalized understanding of the care plan, goals, and all of today's instructions. Encouraged patient/caregiver to communicate with his/her physician and health care team about health conditions and the treatment plan.  Provided my contact information today and encouraged patient/caregiver to call me with any questions as needed.

## 2022-08-30 NOTE — PROGRESS NOTES
===============================  Avril Monzon,  8/30/2022 today   77 y.o. female   Last visit [facility]: :4/29/2021   Last visit eye dept. Visit date not found  VA:  Uncorrected distance visual acuity was NLP in the right eye and CF at 3' in the left eye.  Tonometry       Tonometry (Applanation, 1:59 PM)         Right Left    Pressure 24 18                  Not recorded       Not recorded       Not recorded       Chief Complaint   Patient presents with    Diabetic Eye Exam     Pt states va is about the same as last time       ________________  8/30/2022 today  HPI     Diabetic Eye Exam     Additional comments: Pt states va is about the same as last time           Comments    1.) DM SINCE 1995   PDR / DME  S/P LASER AND INJECTIONS OS(DR BOX AND AT [facility])   2.) NLP OD SINCE BIRTH   3.) DENSE CAT OD / 2+NS OS   4.) Asteroid Hyalosis   5.)iop 42 od 11/5/18   Avastin os 3/15/19   EYLEA OS LAST 4/29/2021          Last edited by JOSE Alegre on 8/30/2022  1:57 PM.      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 Check OU daily.   Gave my office and cell phone number.          .      ===============================

## 2022-08-30 NOTE — PATIENT INSTRUCTIONS
Medication Regimen/Changes:   - Continue Humulin 70/30 28 units in am and 8 units in the evening  - Hold Ozempic    Patient Instructions:  Carbohydrate Count: 30-45 grams/meal and 15 grams/snack with limit of 2 snacks per day.  Exercise: Goal is 150 minutes or more per week.  Bring meter and blood sugar log to each appointment.     Goals for Blood Sugar:  Fastin-130 mg/dl  2 hours after meal:  mg/dl  Before Bed: 100-150 mg/dl  If Blood sugar is below 70 mg/dl, DO NOT take diabetes medication. Eat first and then recheck blood sugar in 20 minutes.  Foods to eat if blood sugar is below 70 mg/dl  1/2 glass of orange juice   1/2 regular cola can   3-4 hard candies   3-4 small glucose tabs.   Foods to eat if blood sugar is below 50 mg/dl  1 glass of orange juice  1 regular cola can  8 hard candies  8 small glucose tabs.   If you have repeated eating/blood sugar recheck process 2 times and blood sugar still below 70 mg/dl, call 911.

## 2022-08-30 NOTE — PROGRESS NOTES
PCP: Rose Parks MD    Subjective:     Chief Complaint: Diabetes follow up.  Last visit with me: 2022    HPI: 77 y.o.  female for diabetes follow up.   Previously managed by JACK Pierce.   Last clinic visit 2016.  Type II diabetes since age 44.  Comorbidities: HTN, HLD, CHF, ESRD, CVA, MI, Dialysis Use - Sched MWF 10-3 PM, Legally blind.  Daughter with patient during visit.  ESRD on dialysis x 2 years    Family History of Type 1/2 DM: mother  Known diabetes complications:  DKA/HHS: no  Retinopathy: yes  Peripheral neuropathy: yes  Nephropathy: yes    Interval history:  Admits to recent hospital admission s/p CABG 2022.  Recent Covid infection 8/3/2022  Admits to having hypoglycemia after started on Ozempic.  Endorses diabetes related symptoms: Tingling in Lower Extremities and Both fingers    Since last visit, pt started on Ozempic per Dr. Mejia.  Per daughter, after 2 weeks on Ozempic, BG readings dropping to 60's.  She was instructed to stop Ozempic.    Daughter also admits that dialysis scheduled changed.  On dialysis days, MWF, pt does not give her am dose of NPH due to being on dialysis until about 2-3 pm.    Blood glucose monitoring: via fingerstick, 2-3x/day  Per pt's BG log review over the last 1 week.   Fasting 210, 188, 136   AC lunch 178   PP lunch 132   AC dinner 122, 228   Bedtime 174    Activity level: Sedentary  Meal Plannin meals/day; infrequent snacks/day.    Medication compliance all of the time, diet compliance most of the time.   To be enrolled in diabetes education classes.     Past failed treatment:  none    Current DM Medications:  Diabetes Medications               insulin NPH/Reg human (HUMULIN, 70/30,) 100 unit/mL (70-30) InPn pen To do 28 units subq in AM and 8 units subq in PM.  Dose to be done within 15 min before or after meal.    semaglutide (OZEMPIC) 0.25 mg or 0.5 mg(2 mg/1.5 mL) pen injector Inject 0.25 mg into the skin every 7 days. Not  taking for the last 2 weeks.       Allergies  Review of patient's allergies indicates:   Allergen Reactions    Codeine Swelling    Darvon [propoxyphene] Swelling    Atorvastatin      Muscle twitching       Labs Reviewed:    Her most recent A1C is:  Lab Results   Component Value Date    HGBA1C 9.3 (H) 07/12/2022    HGBA1C 10.8 (H) 03/08/2022    HGBA1C 8.6 (H) 10/19/2021       Her most recent BMP is:  Lab Results   Component Value Date     (L) 08/26/2022    K 4.2 08/26/2022    CL 99 08/26/2022    CO2 23 08/26/2022    BUN 36 (H) 08/26/2022    CREATININE 5.0 (H) 08/26/2022    CALCIUM 9.2 08/26/2022    ANIONGAP 12 08/26/2022    ESTGFRAFRICA 8 (A) 07/27/2022    ESTGFRAFRICA 8 (A) 07/27/2022    EGFRNONAA 7 (A) 07/27/2022    EGFRNONAA 7 (A) 07/27/2022       No results found for: CPEPTIDE  No results found for: GLUTAMICACID    Body mass index is 20.8 kg/m².    Weight gain 0 lbs. since last visit.  Wt Readings from Last 3 Encounters:   08/30/22 1102 56.7 kg (125 lb)   08/26/22 1426 59 kg (130 lb)   08/25/22 0826 58 kg (127 lb 13.9 oz)        Her blood sugar in clinic today is:  Lab Results   Component Value Date    POCGLU 145 (A) 08/30/2022       Diabetes Management Status  Statin: Not taking  ACE/ARB: Taking    Screening or Prevention Patient's value Goal Complete/Controlled?   HgA1C Testing and Control   Lab Results   Component Value Date    HGBA1C 9.3 (H) 07/12/2022      Annually/Less than 8% No     Lipid profile : 07/13/2022 Annually Yes     LDL control Lab Results   Component Value Date    LDLCALC 102.8 07/13/2022    Annually/Less than 100 mg/dl  No     Nephropathy screening Lab Results   Component Value Date    MICALBCREAT 1244.0 (H) 06/13/2018     Lab Results   Component Value Date    PROTEINUA 3+ (A) 08/26/2022    Annually Yes     Blood pressure BP Readings from Last 1 Encounters:   08/30/22 (!) 105/55    Less than 140/90 No     Dilated retinal exam : 08/30/2022 Annually Yes    Foot exam   : 03/08/2022 Annually  Yes       Social History     Socioeconomic History    Marital status:    Tobacco Use    Smoking status: Former     Packs/day: 1.50     Years: 30.00     Pack years: 45.00     Types: Cigarettes     Quit date: 1990     Years since quittin.6    Smokeless tobacco: Former   Substance and Sexual Activity    Alcohol use: No     Alcohol/week: 0.0 standard drinks    Drug use: No    Sexual activity: Not Currently     Past Medical History:   Diagnosis Date    Acute hypoxemic respiratory failure 2018    Anemia     Arthritis     back, hand, knees    Back pain     Cataract     CKD stage G4/A3, GFR 15 - 29 and albumin creatinine ratio >300 mg/g     GFR 40% 2014 and 33% ub 3/2014 (BRG)    Coronary artery disease involving native coronary artery of native heart 2018    Diabetic retinopathy     DM (diabetes mellitus) type II controlled, neurological manifestation     Exudative age-related macular degeneration of left eye with active choroidal neovascularization 2019    GERD (gastroesophageal reflux disease)     Glaucoma     Heart failure     Hyperlipidemia     Hypertension     Non-ST elevation MI (NSTEMI) 2018    NSTEMI (non-ST elevated myocardial infarction) 2018    Peripheral neuropathy     Polyneuropathy     Proteinuria     >6 mo     Seizures     BRG 2014 -dick CT [place] showed small vessel    Stroke ,    Tobacco dependence     Type 2 diabetes with peripheral circulatory disorder, controlled      Review of Systems   Constitutional: Negative for activity change, appetite change, fatigue and unexpected weight change.   HENT: Negative for dental problem and trouble swallowing.    Eyes: Negative for visual disturbance.   Respiratory: Negative for chest tightness and shortness of breath.    Cardiovascular: Negative for chest pain, palpitations and leg swelling.   Gastrointestinal: Negative for abdominal pain, constipation, diarrhea, nausea and vomiting.   Endocrine: Negative for  polydipsia, polyphagia and polyuria.   Genitourinary: Negative for difficulty urinating, dysuria, frequency and urgency.        Negative yeast infection   Musculoskeletal: Negative for gait problem, myalgias and neck pain.   Integumentary:  Negative for rash and wound.   Allergic/Immunologic: Negative.    Neurological: Positive for numbness (intermittent both feet). Negative for dizziness, syncope, weakness and headaches.        Int tingling BLE, hands   Hematological: Negative.    Psychiatric/Behavioral: Negative for confusion and sleep disturbance.   All other systems reviewed and are negative.     Objective:      Physical Exam  Vitals reviewed.   Constitutional:       Appearance: Normal appearance.   HENT:      Head: Normocephalic and atraumatic.   Eyes:      General: Vision grossly intact.      Extraocular Movements: Extraocular movements intact.      Pupils: Pupils are equal, round, and reactive to light.   Neck:      Thyroid: No thyroid mass or thyroid tenderness.   Cardiovascular:      Rate and Rhythm: Normal rate and regular rhythm.      Pulses: Normal pulses.           Radial pulses are 2+ on the right side and 2+ on the left side.        Dorsalis pedis pulses are 2+ on the right side and 2+ on the left side.      Heart sounds: Normal heart sounds.      Arteriovenous access: left arteriovenous access is present.     Comments: Left upper arm AVF + thrill, + bruit  Pulmonary:      Effort: Pulmonary effort is normal.      Breath sounds: Normal breath sounds.   Abdominal:      General: Bowel sounds are normal.      Palpations: Abdomen is soft.   Musculoskeletal:         General: Normal range of motion.      Cervical back: Normal range of motion and neck supple.      Right lower leg: No edema.      Left lower leg: No edema.      Right foot: No deformity or bunion.      Left foot: No deformity or bunion.   Feet:      Right foot:      Skin integrity: Dry skin present. No ulcer, skin breakdown or callus.       Toenail Condition: Right toenails are abnormally thick.      Left foot:      Skin integrity: Dry skin present. No ulcer, skin breakdown or callus.      Toenail Condition: Left toenails are abnormally thick.   Lymphadenopathy:      Cervical: No cervical adenopathy.   Skin:     General: Skin is warm and dry.      Findings: No rash or wound.      Comments: Negative for acanthosis nigricans. Well-healed SQ injection sites.   Neurological:      Mental Status: She is alert and oriented to person, place, and time.      Coordination: Coordination is intact.      Gait: Gait is intact.   Psychiatric:         Attention and Perception: Attention normal.         Mood and Affect: Mood normal.         Speech: Speech normal.         Behavior: Behavior normal. Behavior is cooperative.         Thought Content: Thought content normal.         Cognition and Memory: Cognition normal.     Assessment / Plan:     Avril was seen today for diabetes mellitus.    Diagnoses and all orders for this visit:    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, with long-term current use of insulin  -     POCT Glucose, Hand-Held Device  -     Hemoglobin A1C; Future  -     blood sugar diagnostic Strp; 1 each by Misc.(Non-Drug; Combo Route) route 3 (three) times daily.  -     lancets Misc; 1 each by Misc.(Non-Drug; Combo Route) route 3 (three) times daily.    Hypertension associated with diabetes    Hyperlipidemia associated with type 2 diabetes mellitus    History of CVA (cerebrovascular accident)    ESRD (end stage renal disease)     Additional Plan Details:  - Condition: uncontrolled, most recent A1c of 9.3% was completed 2 month ago.   - Monitor blood glucose:  2x daily.Goals reviewed. Maintain BG log. [facility] in clinic today to monitor BG readings closely.  - Hypoglycemia protocol: Reviewed and risk discussed.  Notify if BG readings below 80. Protocol printout provided.   - Diet: Limit carbohydrate intake 30-45  grams/meal, 3x daily and 15 grams/snack, limit 2/day.   - Activity: Recommend at least 150 minutes of exercise in a week.  - BP and LDL: Recommend lifestyle modifications and follow up with PCP for management.   - Medication Changes:    - Continue Humulin 70/30 28 units in am and 8 units in the evening   - Hold Ozempic for now.    - Medication consideration: Pending CGM data download, will adjust insulin dose. Plan to resume Ozempic.   - Lab results: A1C discussed.  - Health Maintenance standards addressed today:  A1c to be scheduled.   - Risks of uncontrolled DM explained. Importance of routine foot and eye exams reviewed. Scheduled as appropriate.  -The patient was explained the above plan and given opportunity to ask questions.  She understands, chooses and consents to this plan and accepts all the risks, which include but are not limited to the risks mentioned above.   - Labs ordered as above.  - CDE referral: Scheduled  - Follow up: 2 weeks for CGM data download and determine insulin dose adjustments.        Natty Mistry, FNP-C [facility]    A total of 30 minutes was spent in face to face time, of which over 50 % was spent in counseling patient on disease process, complications, treatment, and side effects of medications.

## 2022-08-31 NOTE — PROGRESS NOTES
Subjective:      Avril Monzon is a 77 y.o. female, here today for follow-up of:  Follow-up        PCP: Rose Parks MD --- last visit 4/6/2022  The patient's last visit with me was on 8/2/2022.      HPI:    Mrs. Monzon is here for ER follow-up.  Seen at [facility] on 8/26/2022.  Diagnoses:  Acute cystitis without hematuria  Chronic cough  History of COVID-19  ESRD on dialysis      ER note:    Chief Complaint   Patient presents with    Fatigue       Has bypass surgery end of July. Got covid first week of august. Has been fatigued ever since with cough.      History of Present Illness: Avril Monzon is a 77 y.o. female patient with a PMHx of CAD, HTN, NSTEMI, ESRD who presents to the Emergency Department for generalized weakness, onset 3 weeks PTA. Pt had a CABG on 7/20/22, and was dx with COVID-19 shortly after the procedure. Pt followed with her PCP yesterday and had a CXR done, which was negative. Symptoms are constant and moderate in severity. No mitigating or exacerbating factors reported. Associated sxs include cough, fatigue, and exertional SOB. Patient denies any fever, chills, n/v/d, CP, numbness, dizziness, headache, and all other sxs at this time. Pt dialyzes every M/W/F, but has not yet dialyzed today (Friday). Pt still produces urine. No further complaints or concerns at this time.     CXR:  FINDINGS:  Cardiac silhouette remains enlarged.  Prior sternotomy again noted.    Pulmonary vasculature remains diffusely congested in appearance with suspected mild interstitial edema which appears similar to prior.  There are small to moderate bilateral pleural effusions which may be slightly increased in size from prior.  No acute osseous findings demonstrated.    ED Medication(s):  Medications   cefTRIAXone (ROCEPHIN) 1 g/50 mL D5W IVPB (0 g Intravenous Stopped 8/26/22 1441)     START taking these medications     Details   benzonatate (TESSALON) 200 MG capsule Take 1 capsule (200 mg total) by mouth 3  (three) times daily as needed for Cough., Starting Fri 8/26/2022, Until Mon 9/5/2022 at 2359, Normal       cefUROXime (CEFTIN) 500 MG tablet Take 1 tablet (500 mg total) by mouth 2 (two) times daily. for 7 days, Starting Fri 8/26/2022, Until Fri 9/2/2022, Normal               Component      Latest Ref Rng & Units 8/26/2022   WBC      3.90 - 12.70 K/uL 9.37   RBC      4.00 - 5.40 M/uL 2.42 (L)   Hemoglobin      12.0 - 16.0 g/dL 7.5 (L)   Hematocrit      37.0 - 48.5 % 23.8 (L)   MCV      82 - 98 fL 98   MCH      27.0 - 31.0 pg 31.0   MCHC      32.0 - 36.0 g/dL 31.5 (L)   RDW      11.5 - 14.5 % 19.5 (H)   Platelets      150 - 450 K/uL 182   MPV      9.2 - 12.9 fL 9.1 (L)   Immature Granulocytes      0.0 - 0.5 % 0.3   Gran # (ANC)      1.8 - 7.7 K/uL 7.9 (H)   Immature Grans (Abs)      0.00 - 0.04 K/uL 0.03   Lymph #      1.0 - 4.8 K/uL 0.8 (L)   Mono #      0.3 - 1.0 K/uL 0.4   Eos #      0.0 - 0.5 K/uL 0.1   Baso #      0.00 - 0.20 K/uL 0.03   nRBC      0 /100 WBC 0   Gran %      38.0 - 73.0 % 84.4 (H)   Lymph %      18.0 - 48.0 % 9.0 (L)   Mono %      4.0 - 15.0 % 4.7   Eosinophil %      0.0 - 8.0 % 1.3   Basophil %      0.0 - 1.9 % 0.3   Differential Method       Automated   Sodium      136 - 145 mmol/L 134 (L)   Potassium      3.5 - 5.1 mmol/L 4.2   Chloride      95 - 110 mmol/L 99   CO2      23 - 29 mmol/L 23   Glucose      70 - 110 mg/dL 213 (H)   BUN      8 - 23 mg/dL 36 (H)   Creatinine      0.5 - 1.4 mg/dL 5.0 (H)   Calcium      8.7 - 10.5 mg/dL 9.2   PROTEIN TOTAL      6.0 - 8.4 g/dL 6.7   Albumin      3.5 - 5.2 g/dL 3.0 (L)   BILIRUBIN TOTAL      0.1 - 1.0 mg/dL 0.7   Alkaline Phosphatase      55 - 135 U/L 89   AST      10 - 40 U/L 18   ALT      10 - 44 U/L 12   Anion Gap      8 - 16 mmol/L 12   eGFR      >60 mL/min/1.73 m:2 9 (A)   Specimen UA       Urine, Catheterized   Color, UA      Yellow, Straw, Hanane Yellow   Appearance, UA      Clear Cloudy (A)   pH, UA      5.0 - 8.0 6.0   Specific Gravity, UA       1.005 - 1.030 1.015   Protein, UA      Negative 3+ (A)   Glucose, UA      Negative Negative   Ketones, UA      Negative Trace (A)   Bilirubin (UA)      Negative Negative   Occult Blood UA      Negative 1+ (A)   NITRITE UA      Negative Negative   UROBILINOGEN UA      <2.0 EU/dL 2.0-3.0 (A)   Leukocytes, UA      Negative 3+ (A)   RBC, UA      0 - 4 /hpf 41 (H)   WBC, UA      0 - 5 /hpf >100 (H)   WBC Clumps, UA      None-Rare Many (A)   Bacteria, UA      None-Occ /hpf Many (A)   Squam Epithel, UA      /hpf 2   Hyaline Casts, UA      0-1/lpf /lpf 15 (A)   Microscopic Comment       SEE COMMENT       Component 2 wk ago    Urine Culture, Routine  Abnormal   ESCHERICHIA COLI   >100,000 cfu/ml      Susceptibility   Escherichia coli    CULTURE, URINE    Amox/K Clav'ate <=8/4 mcg/mL Sensitive    Amp/Sulbactam 16/8 mcg/mL Intermediate    Ampicillin >16 mcg/mL Resistant    Cefazolin <=2 mcg/mL Sensitive    Cefepime <=2 mcg/mL Sensitive    Ceftriaxone <=1 mcg/mL Sensitive    Ciprofloxacin <=1 mcg/mL Sensitive    Ertapenem <=0.5 mcg/mL Sensitive    Gentamicin <=4 mcg/mL Sensitive    Levofloxacin <=2 mcg/mL Sensitive    Meropenem <=1 mcg/mL Sensitive    Nitrofurantoin <=32 mcg/mL Sensitive    Piperacillin/Tazo <=16 mcg/mL Sensitive    Tobramycin <=4 mcg/mL Sensitive    Trimeth/Sulfa >2/38 mcg/mL Resistant              Review of Systems   Constitutional:  Positive for activity change, appetite change and fatigue. Negative for chills, diaphoresis and fever.   HENT: Negative.     Respiratory:  Positive for cough, chest tightness and shortness of breath. Negative for wheezing and stridor.    Cardiovascular: Negative.    Gastrointestinal: Negative.    Genitourinary:  Positive for difficulty urinating. Negative for flank pain and hematuria.        ESRD on HD --- MWF schedule   Neurological:  Positive for weakness. Negative for dizziness, tremors, syncope, speech difficulty, light-headedness and headaches.       Review of patient's  allergies indicates:   Allergen Reactions    Codeine Swelling    Darvon [propoxyphene] Swelling    Atorvastatin      Muscle twitching       Patient Active Problem List   Diagnosis    Constipation    Hypertension associated with diabetes    ESRD (end stage renal disease)    Proteinuria    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, with long-term current use of insulin    Cataracta brunescens of right eye    Bilateral carotid artery disease    Aortic atherosclerosis    Type 2 diabetes mellitus with circulatory disorder, with long-term current use of insulin    History of CVA (cerebrovascular accident)    Hyperparathyroidism    Vitamin D deficiency    DDD (degenerative disc disease), lumbar    Anemia in chronic kidney disease, on chronic dialysis    Iron deficiency anemia due to chronic blood loss    Hyperlipidemia associated with type 2 diabetes mellitus    Pulmonary hypertension    CAD (coronary artery disease)    Exudative age-related macular degeneration of left eye with active choroidal neovascularization    Non-rheumatic mitral regurgitation    Non-rheumatic tricuspid valve insufficiency    Age-related nuclear cataract of left eye    DM type 2 with diabetic peripheral neuropathy    Pulmonary edema    Unspecified inflammatory spondylopathy, lumbar region    Convulsions    Hemiplegia    Other specified complication of vascular prosthetic devices, implants and grafts, initial encounter    Elevated troponin    Chronic diastolic heart failure    NSTEMI (non-ST elevated myocardial infarction)    Metabolic acidosis    S/P CABG (coronary artery bypass graft)    COVID         Current Outpatient Medications:     ACCU-CHEK ARDEN PLUS METER Misc, USE TO TEST TWICE DAILY, Disp: , Rfl: 0    albuterol (PROVENTIL/VENTOLIN HFA) 90 mcg/actuation inhaler, Inhale 2 puffs into the lungs every 6 (six) hours as needed for Shortness of Breath., Disp: 8 g, Rfl: 0    ascorbic acid, vitamin C, (VITAMIN C)  500 MG tablet, Take 1 tablet (500 mg total) by mouth once daily., Disp: 30 tablet, Rfl: 0    aspirin (ECOTRIN) 81 MG EC tablet, Take 1 tablet (81 mg total) by mouth once daily., Disp: 90 tablet, Rfl: 1    azelastine (ASTELIN) 137 mcg (0.1 %) nasal spray, 1 spray (137 mcg total) by Nasal route 2 (two) times daily., Disp: 30 mL, Rfl: 0    benzonatate (TESSALON) 200 MG capsule, Take 1 capsule (200 mg total) by mouth 3 (three) times daily as needed for Cough., Disp: 30 capsule, Rfl: 0    blood sugar diagnostic Strp, 1 each by Misc.(Non-Drug; Combo Route) route 3 (three) times daily., Disp: 100 strip, Rfl: 11    cefUROXime (CEFTIN) 500 MG tablet, Take 1 tablet (500 mg total) by mouth 2 (two) times daily. for 7 days, Disp: 14 tablet, Rfl: 0    clopidogreL (PLAVIX) 75 mg tablet, Take 1 tablet (75 mg total) by mouth once daily., Disp: 90 tablet, Rfl: 3    cyanocobalamin (VITAMIN B-12) 1000 MCG tablet, Take 100 mcg by mouth once daily., Disp: , Rfl:     famotidine (PEPCID) 20 MG tablet, Take 1 tablet (20 mg total) by mouth once daily at 6am., Disp: 30 tablet, Rfl: 0    folic acid (FOLVITE) 1 MG tablet, Take 1 mg by mouth once daily., Disp: , Rfl:     insulin NPH/Reg human (HUMULIN, 70/30,) 100 unit/mL (70-30) InPn pen, To do 28 units subq in AM and 8 units subq in PM.  Dose to be done within 15 min before or after meal. (Patient taking differently: To do 28 units subq in AM and 8 units subq in PM.  Dose to be done within 15 min before or after meal.), Disp: 3 each, Rfl: 1    lancets Misc, 1 each by Misc.(Non-Drug; Combo Route) route 3 (three) times daily., Disp: 100 each, Rfl: 11    metoprolol tartrate (LOPRESSOR) 50 MG tablet, Take 1 tablet (50 mg total) by mouth 2 (two) times daily., Disp: 180 tablet, Rfl: 1    multivitamin Tab, Take 1 tablet by mouth once daily., Disp: 30 tablet, Rfl: 0    semaglutide (OZEMPIC) 0.25 mg or 0.5 mg(2 mg/1.5 mL) pen injector, Inject 0.25 mg into the skin every 7 days., Disp: 1 pen, Rfl: 1     "sevelamer carbonate (RENVELA) 800 mg Tab, TAKE 2 TABLETS BY MOUTH THREE TIMES DAILY, Disp: 180 tablet, Rfl: 0    telmisartan (MICARDIS) 20 MG Tab, Take 1 tablet (20 mg total) by mouth once daily., Disp: 90 tablet, Rfl: 1    traMADoL (ULTRAM) 50 mg tablet, Take 1 tablet (50 mg total) by mouth every 8 (eight) hours as needed for Pain., Disp: 10 tablet, Rfl: 0    triamcinolone acetonide 0.1% (KENALOG) 0.1 % cream, Apply topically 2 (two) times daily., Disp: 80 g, Rfl: 2      Past medical, surgical, family and social histories have been reviewed today.      Objective:     Vitals:    09/01/22 1122   BP: 132/62   Pulse: 67   Temp: 98.7 °F (37.1 °C)   SpO2: 95%   Weight: 57.1 kg (125 lb 14.1 oz)   Height: 5' 5" (1.651 m)       Physical Exam  Vitals reviewed.   Constitutional:       General: She is not in acute distress.  HENT:      Head: Normocephalic and atraumatic.   Neck:      Vascular: No carotid bruit.   Cardiovascular:      Rate and Rhythm: Normal rate and regular rhythm.      Pulses: Normal pulses.      Heart sounds: Normal heart sounds.   Pulmonary:      Effort: Pulmonary effort is normal.      Breath sounds: Normal breath sounds.   Musculoskeletal:      Cervical back: Normal range of motion and neck supple. No rigidity.   Lymphadenopathy:      Cervical: No cervical adenopathy.   Skin:     Capillary Refill: Capillary refill takes less than 2 seconds.   Neurological:      Mental Status: She is alert and oriented to person, place, and time. Mental status is at baseline.      Gait: Gait abnormal.   Psychiatric:         Mood and Affect: Mood normal.         Behavior: Behavior normal.         Thought Content: Thought content normal.         Judgment: Judgment normal.         Diagnosis/Assessment:     1. Post-COVID syndrome  - X-Ray Chest PA And Lateral; Future  - Ambulatory referral/consult to Pulmonology; Future    2. Pleural effusion  - X-Ray Chest PA And Lateral; Future  - Ambulatory referral/consult to Pulmonology; " Future    3. SOB (shortness of breath)  - X-Ray Chest PA And Lateral; Future  - Ambulatory referral/consult to Pulmonology; Future    4. Urinary tract infection without hematuria, site unspecified --- stop current antibiotic as twice daily dosing not feasible as she does dialysis 3 days per week.  The AM dose will get flushed out her system when she goes to dialysis.  Changed her abx to Augmentin once daily dosing x 10 days.  Monitor.  - amoxicillin-clavulanate 500-125mg (AUGMENTIN) 500-125 mg Tab; Take 1 tablet (500 mg total) by mouth every evening. for 10 days  Dispense: 10 tablet; Refill: 0       Plan:     CXR today.  To Pulmonary.    Follow-Up:     Pending CXR.  Recheck urine when abx done.  RTC as directed and/or prn.        SANCHEZ Salvador  [facility]     45 minutes of total time spent on the encounter, which includes face to face time and non-face to face time preparing to see the patient (eg, review of tests), obtaining and/or reviewing separately obtained history, and documenting clinical information in the electronic or other health record.  Also includes independent interpretation of results (not separately reported) and communicating results to the patient/family/caregiver, with care coordination (not separately reported).

## 2022-09-01 ENCOUNTER — OUTPATIENT CASE MANAGEMENT (OUTPATIENT)
Dept: ADMINISTRATIVE | Facility: OTHER | Age: 77
End: 2022-09-01
Payer: MEDICARE

## 2022-09-01 ENCOUNTER — HOSPITAL ENCOUNTER (OUTPATIENT)
Dept: RADIOLOGY | Facility: HOSPITAL | Age: 77
Discharge: HOME | End: 2022-09-01
Attending: REGISTERED NURSE
Payer: MEDICARE

## 2022-09-01 ENCOUNTER — OFFICE VISIT (OUTPATIENT)
Dept: FAMILY MEDICINE | Facility: CLINIC | Age: 77
End: 2022-09-01
Payer: MEDICARE

## 2022-09-01 ENCOUNTER — TELEPHONE (OUTPATIENT)
Dept: ADMINISTRATIVE | Facility: HOSPITAL | Age: 77
End: 2022-09-01
Payer: MEDICARE

## 2022-09-01 DIAGNOSIS — R06.02 SOB (SHORTNESS OF BREATH): ICD-10-CM

## 2022-09-01 DIAGNOSIS — J90 PLEURAL EFFUSION: ICD-10-CM

## 2022-09-01 DIAGNOSIS — U09.9 POST-COVID SYNDROME: Primary | ICD-10-CM

## 2022-09-01 DIAGNOSIS — N39.0 URINARY TRACT INFECTION WITHOUT HEMATURIA, SITE UNSPECIFIED: ICD-10-CM

## 2022-09-01 DIAGNOSIS — U07.1 COVID-19 VIRUS INFECTION: ICD-10-CM

## 2022-09-01 PROCEDURE — 99999 PR PBB SHADOW E&M-EST. PATIENT-LVL V: ICD-10-PCS | Mod: PBBFAC,,, | Performed by: REGISTERED NURSE

## 2022-09-01 PROCEDURE — 3046F HEMOGLOBIN A1C LEVEL >9.0%: CPT | Mod: CPTII,S$GLB,, | Performed by: REGISTERED NURSE

## 2022-09-01 PROCEDURE — 3066F NEPHROPATHY DOC TX: CPT | Mod: CPTII,S$GLB,, | Performed by: REGISTERED NURSE

## 2022-09-01 PROCEDURE — 3066F PR DOCUMENTATION OF TREATMENT FOR NEPHROPATHY: ICD-10-PCS | Mod: CPTII,S$GLB,, | Performed by: REGISTERED NURSE

## 2022-09-01 PROCEDURE — 1101F PT FALLS ASSESS-DOCD LE1/YR: CPT | Mod: CPTII,S$GLB,, | Performed by: REGISTERED NURSE

## 2022-09-01 PROCEDURE — 99215 PR OFFICE/OUTPT VISIT, EST, LEVL V, 40-54 MIN: ICD-10-PCS | Mod: S$GLB,,, | Performed by: REGISTERED NURSE

## 2022-09-01 PROCEDURE — 3075F PR MOST RECENT SYSTOLIC BLOOD PRESS GE 130-139MM HG: ICD-10-PCS | Mod: CPTII,S$GLB,, | Performed by: REGISTERED NURSE

## 2022-09-01 PROCEDURE — 3046F PR MOST RECENT HEMOGLOBIN A1C LEVEL > 9.0%: ICD-10-PCS | Mod: CPTII,S$GLB,, | Performed by: REGISTERED NURSE

## 2022-09-01 PROCEDURE — 1101F PR PT FALLS ASSESS DOC 0-1 FALLS W/OUT INJ PAST YR: ICD-10-PCS | Mod: CPTII,S$GLB,, | Performed by: REGISTERED NURSE

## 2022-09-01 PROCEDURE — 3288F FALL RISK ASSESSMENT DOCD: CPT | Mod: CPTII,S$GLB,, | Performed by: REGISTERED NURSE

## 2022-09-01 PROCEDURE — 3078F DIAST BP <80 MM HG: CPT | Mod: CPTII,S$GLB,, | Performed by: REGISTERED NURSE

## 2022-09-01 PROCEDURE — 71046 X-RAY EXAM CHEST 2 VIEWS: CPT | Mod: 26,,, | Performed by: RADIOLOGY

## 2022-09-01 PROCEDURE — 1126F AMNT PAIN NOTED NONE PRSNT: CPT | Mod: CPTII,S$GLB,, | Performed by: REGISTERED NURSE

## 2022-09-01 PROCEDURE — 71046 X-RAY EXAM CHEST 2 VIEWS: CPT | Mod: TC,FY,PO

## 2022-09-01 PROCEDURE — 99999 PR PBB SHADOW E&M-EST. PATIENT-LVL V: CPT | Mod: PBBFAC,,, | Performed by: REGISTERED NURSE

## 2022-09-01 PROCEDURE — 3078F PR MOST RECENT DIASTOLIC BLOOD PRESSURE < 80 MM HG: ICD-10-PCS | Mod: CPTII,S$GLB,, | Performed by: REGISTERED NURSE

## 2022-09-01 PROCEDURE — 99215 OFFICE O/P EST HI 40 MIN: CPT | Mod: S$GLB,,, | Performed by: REGISTERED NURSE

## 2022-09-01 PROCEDURE — 4010F PR ACE/ARB THEARPY RXD/TAKEN: ICD-10-PCS | Mod: CPTII,S$GLB,, | Performed by: REGISTERED NURSE

## 2022-09-01 PROCEDURE — 1126F PR PAIN SEVERITY QUANTIFIED, NO PAIN PRESENT: ICD-10-PCS | Mod: CPTII,S$GLB,, | Performed by: REGISTERED NURSE

## 2022-09-01 PROCEDURE — 71046 XR CHEST PA AND LATERAL: ICD-10-PCS | Mod: 26,,, | Performed by: RADIOLOGY

## 2022-09-01 PROCEDURE — 3288F PR FALLS RISK ASSESSMENT DOCUMENTED: ICD-10-PCS | Mod: CPTII,S$GLB,, | Performed by: REGISTERED NURSE

## 2022-09-01 PROCEDURE — 3075F SYST BP GE 130 - 139MM HG: CPT | Mod: CPTII,S$GLB,, | Performed by: REGISTERED NURSE

## 2022-09-01 PROCEDURE — 4010F ACE/ARB THERAPY RXD/TAKEN: CPT | Mod: CPTII,S$GLB,, | Performed by: REGISTERED NURSE

## 2022-09-01 RX ORDER — AMOXICILLIN AND CLAVULANATE POTASSIUM 500; 125 MG/1; MG/1
1 TABLET, FILM COATED ORAL NIGHTLY
Qty: 10 TABLET | Refills: 0 | Status: SHIPPED | OUTPATIENT
Start: 2022-09-01 | End: 2022-09-11

## 2022-09-01 NOTE — LETTER
September 6, 2022    Avril BURNS Monzon  [address] [facility] [place] [address]             [facility] [address] [place] [address] Dear Ms. Monzon:        I am writing from the Outpatient Care Management Department at [facility].  I received a referral from Debbie Lopez RN to contact you regarding any needs you may have.  I have been unable to reach you by phone.   Please contact me if you would like to discuss any needs you may have.    I can be reached at [phone] Monday thru Friday from 8:00am to 4:30pm.  [facility] also has a program with a nurse available 24/7 to answer questions or provide medical advice.  [facility] can be reached at [phone].    If you have any questions or concerns, please don't hesitate to call.      Sincerely,  Maritza Palmer LCSW

## 2022-09-01 NOTE — PROGRESS NOTES
"  PLACEMENT OF FREESTYLE WON PRO SENSOR  CONTINOUS GLUCOSE MONITORING SYSTEM (CGMS)     Patient is here in clinic today for placement of continuous glucose monitoring sensor.                          Each patient verified that they were here for CGMS procedure ordered by their provider and that they have a working glucose meter and supplies at home.   Patient will be provided with a Freestyle Won Sensor and a copy of the Continuous Glucose Monitoring Patient Log to fill out during the study.                        A detailed explanation of Continuous Glucose Monitoring was provided. Patient informed that this is a blind procedure and that they will not actually see the blood sugar tracing in real time.  Reviewed with patient the [employer] patient education handout called "Your Freestyle Won Pro sensor: What you need to know" to review self-care during the study to avoid sensor loosening or removal ie... Bathing, Swimming, dressing, and exercising.                        Instructed patient to check blood sugar using home glucometer and to record the following on provided patient log sheets:  Blood sugar taken at home, Meals and snacks, Activity, and Diabetes medications taken and dosage                         Patient was brought to a private location.  Arm for insertion was selected and prepared and allowed to dry. Glucose Sensor was inserted to back of patient's Right upper arm.                         The following forms were given and reviewed in detail with patient and all questions answered.   Continuous Glucose Monitoring Patient Log   [employer] Patient Handout "Your Freestyle Won Pro Sensor: What you need to know"                     Instructions: Time: 15 min   Insertion:  Time: 5 minutes      PLACEMENT OF FREESTYLE WON PRO SENSOR  CONTINOUS GLUCOSE MONITORING SYSTEM (CGMS)     "

## 2022-09-02 ENCOUNTER — DOCUMENT SCAN (OUTPATIENT)
Dept: HOME HEALTH SERVICES | Facility: HOSPITAL | Age: 77
End: 2022-09-02
Payer: MEDICARE

## 2022-09-02 NOTE — PROGRESS NOTES
1st Attempt to complete Social Work Assessment for Outpatient Care Management. Contacted pt to complete assessment; introduced self and explained role. Pt is currently on dialysis and requests call back on Tuesday morning.

## 2022-09-05 VITALS
BODY MASS INDEX: 20.97 KG/M2 | DIASTOLIC BLOOD PRESSURE: 62 MMHG | OXYGEN SATURATION: 95 % | WEIGHT: 125.88 LBS | TEMPERATURE: 99 F | SYSTOLIC BLOOD PRESSURE: 132 MMHG | HEIGHT: 65 IN | HEART RATE: 67 BPM

## 2022-09-06 ENCOUNTER — TELEPHONE (OUTPATIENT)
Dept: DIABETES | Facility: CLINIC | Age: 77
End: 2022-09-06
Payer: MEDICARE

## 2022-09-06 ENCOUNTER — CLINICAL SUPPORT (OUTPATIENT)
Dept: DIABETES | Facility: CLINIC | Age: 77
End: 2022-09-06
Payer: MEDICARE

## 2022-09-06 ENCOUNTER — OFFICE VISIT (OUTPATIENT)
Dept: PULMONOLOGY | Facility: CLINIC | Age: 77
End: 2022-09-06
Payer: MEDICARE

## 2022-09-06 VITALS
HEIGHT: 65 IN | WEIGHT: 125.88 LBS | HEART RATE: 76 BPM | OXYGEN SATURATION: 97 % | BODY MASS INDEX: 20.97 KG/M2 | DIASTOLIC BLOOD PRESSURE: 70 MMHG | RESPIRATION RATE: 17 BRPM | SYSTOLIC BLOOD PRESSURE: 112 MMHG

## 2022-09-06 DIAGNOSIS — U07.1 COVID-19 VIRUS INFECTION: ICD-10-CM

## 2022-09-06 DIAGNOSIS — E11.3553 TYPE 2 DIABETES MELLITUS WITH STABLE PROLIFERATIVE RETINOPATHY OF BOTH EYES, WITH LONG-TERM CURRENT USE OF INSULIN: Primary | ICD-10-CM

## 2022-09-06 DIAGNOSIS — R06.02 SOB (SHORTNESS OF BREATH): ICD-10-CM

## 2022-09-06 DIAGNOSIS — J90 BILATERAL PLEURAL EFFUSION: ICD-10-CM

## 2022-09-06 DIAGNOSIS — Z79.4 TYPE 2 DIABETES MELLITUS WITH STABLE PROLIFERATIVE RETINOPATHY OF BOTH EYES, WITH LONG-TERM CURRENT USE OF INSULIN: Primary | ICD-10-CM

## 2022-09-06 DIAGNOSIS — Z95.1 HX OF CABG: ICD-10-CM

## 2022-09-06 DIAGNOSIS — Z92.89 HISTORY OF RECENT HOSPITALIZATION: Primary | ICD-10-CM

## 2022-09-06 DIAGNOSIS — N18.6 ESRD (END STAGE RENAL DISEASE): ICD-10-CM

## 2022-09-06 PROCEDURE — 99499 UNLISTED E&M SERVICE: CPT | Mod: HCNC,S$GLB,, | Performed by: INTERNAL MEDICINE

## 2022-09-06 PROCEDURE — 99999 PR PBB SHADOW E&M-EST. PATIENT-LVL III: CPT | Mod: PBBFAC,,, | Performed by: DIETITIAN, REGISTERED

## 2022-09-06 PROCEDURE — G0108 PR DIAB MANAGE TRN  PER INDIV: ICD-10-PCS | Mod: S$GLB,,, | Performed by: DIETITIAN, REGISTERED

## 2022-09-06 PROCEDURE — 99999 PR PBB SHADOW E&M-EST. PATIENT-LVL III: ICD-10-PCS | Mod: PBBFAC,,, | Performed by: DIETITIAN, REGISTERED

## 2022-09-06 PROCEDURE — 3046F PR MOST RECENT HEMOGLOBIN A1C LEVEL > 9.0%: ICD-10-PCS | Mod: CPTII,S$GLB,, | Performed by: INTERNAL MEDICINE

## 2022-09-06 PROCEDURE — 3066F NEPHROPATHY DOC TX: CPT | Mod: CPTII,S$GLB,, | Performed by: INTERNAL MEDICINE

## 2022-09-06 PROCEDURE — 99999 PR PBB SHADOW E&M-EST. PATIENT-LVL III: ICD-10-PCS | Mod: PBBFAC,,, | Performed by: INTERNAL MEDICINE

## 2022-09-06 PROCEDURE — 3078F PR MOST RECENT DIASTOLIC BLOOD PRESSURE < 80 MM HG: ICD-10-PCS | Mod: CPTII,S$GLB,, | Performed by: INTERNAL MEDICINE

## 2022-09-06 PROCEDURE — 3066F PR DOCUMENTATION OF TREATMENT FOR NEPHROPATHY: ICD-10-PCS | Mod: CPTII,S$GLB,, | Performed by: INTERNAL MEDICINE

## 2022-09-06 PROCEDURE — 99499 RISK ADDL DX/OHS AUDIT: ICD-10-PCS | Mod: HCNC,S$GLB,, | Performed by: INTERNAL MEDICINE

## 2022-09-06 PROCEDURE — 99214 PR OFFICE/OUTPT VISIT, EST, LEVL IV, 30-39 MIN: ICD-10-PCS | Mod: S$GLB,,, | Performed by: INTERNAL MEDICINE

## 2022-09-06 PROCEDURE — 3046F HEMOGLOBIN A1C LEVEL >9.0%: CPT | Mod: CPTII,S$GLB,, | Performed by: INTERNAL MEDICINE

## 2022-09-06 PROCEDURE — 3078F DIAST BP <80 MM HG: CPT | Mod: CPTII,S$GLB,, | Performed by: INTERNAL MEDICINE

## 2022-09-06 PROCEDURE — 4010F PR ACE/ARB THEARPY RXD/TAKEN: ICD-10-PCS | Mod: CPTII,S$GLB,, | Performed by: INTERNAL MEDICINE

## 2022-09-06 PROCEDURE — 3074F PR MOST RECENT SYSTOLIC BLOOD PRESSURE < 130 MM HG: ICD-10-PCS | Mod: CPTII,S$GLB,, | Performed by: INTERNAL MEDICINE

## 2022-09-06 PROCEDURE — G0108 DIAB MANAGE TRN  PER INDIV: HCPCS | Mod: S$GLB,,, | Performed by: DIETITIAN, REGISTERED

## 2022-09-06 PROCEDURE — 4010F ACE/ARB THERAPY RXD/TAKEN: CPT | Mod: CPTII,S$GLB,, | Performed by: INTERNAL MEDICINE

## 2022-09-06 PROCEDURE — 99214 OFFICE O/P EST MOD 30 MIN: CPT | Mod: S$GLB,,, | Performed by: INTERNAL MEDICINE

## 2022-09-06 PROCEDURE — 99999 PR PBB SHADOW E&M-EST. PATIENT-LVL III: CPT | Mod: PBBFAC,,, | Performed by: INTERNAL MEDICINE

## 2022-09-06 PROCEDURE — 3074F SYST BP LT 130 MM HG: CPT | Mod: CPTII,S$GLB,, | Performed by: INTERNAL MEDICINE

## 2022-09-06 NOTE — TELEPHONE ENCOUNTER
----- Message from Natty Mistry NP sent at 9/6/2022 10:59 AM CDT -----  Regarding: Refill for meter strips  Please call patient and inform that insurance prefers accu check meter or true metrix. Can I change her prescription to one of these?

## 2022-09-06 NOTE — PROGRESS NOTES
Diabetes Care Specialist Progress Note  Author: Conchita Garcia RD, CDE  Date: 9/6/2022    Program Intake  Reason for Diabetes Program Visit:: Initial Diabetes Assessment  Type of Intervention:: Individual  Education: Self-Management Skill Review  In the last 12 months, have you:: been admitted to a hospital  Was the ER or hospital admission related to diabetes?: No    Lab Results   Component Value Date    HGBA1C 9.3 (H) 07/12/2022       Clinical     Problem Review  Active comorbidities affecting diabetes self-care.: yes (HTN, HLD, CAD, CHF, Stroke, Macular degeneration left eye, ESRD, Anemia, VitD defn, Neuropathy)    Clinical Assessment  Current Diabetes Treatment: Diet, Exercise, Insulin (NPH 70/30 insulin 28units am/8units pm - patient's daughter dosing insulin after meals)  Have you ever experienced hypoglycemia (low blood sugar)?: yes  In the last month, how often have you experienced low blood sugar?: more than once a week  Are you able to tell when your blood sugar is low?: Yes  What symptoms do you experience?: Sweaty, Shaky, Tiredness  Have you ever been hospitalized because your blood sugar was too low?: no  How do you treat hypoglycemia (low blood sugar)?:  (food - sandwich, crackers, fruit)  Have you ever experienced hyperglycemia (high blood sugar)?: no    Medication Information  How many days a week do you miss your medications?: Never  Do you sometimes have difficulty refilling your medications?: No  Medication adherence impacting ability to self-manage diabetes?: No    Labs  Do you have regular lab work to monitor your medications?: Yes  Type of Regular Lab Work: A1c, Cholesterol, CBC, BMP  [facility]  Lab Compliance Barriers: No    Nutritional Status  Change in appetite?: Yes (decreased)  Recent Changes in Weight: Weight Loss (~7lbs past month)  Was weight loss intentional or unintentional?: Unintentional  Current nutritional status an area of need that is  impacting patient's ability to self-manage diabetes?: Yes - irregular meal patterns and inadequate oral intake; pt dislikes oral supplements.     Additional Social History    Support  Does anyone support you with your diabetes care?: yes  Who supports you?: son/daughter  Who takes you to your medical appointments?: son/daughter  Does the current support meet the patient's needs?: Yes  Is Support an area impacting ability to self-manage diabetes?: No    Access to Mass Media & Technology  Does the patient have access to any of the following devices or technologies?: Smart phone, Internet Access  Media or technology needs impacting ability to self-manage diabetes?: No    Cognitive/Behavioral Health  Alert and Oriented: Yes  Difficulty Thinking: No  Requires Prompting: No  Requires assistance for routine expression?: No  Cognitive or behavioral barriers impacting ability to self-manage diabetes?: No    Culture/Orthodoxy  Culture or Confucianism beliefs that may impact ability to access healthcare: No    Communication  Language preference: English  Hearing Problems: No  Vision Problems: Yes  Vision problem type:: Decreased Vision  Vision Assistance: Glasses  Communication needs impacting ability to self-manage diabetes?: No    Health Literacy  Preferred Learning Method: Face to Face, Demonstration, Reading Materials  How often do you need to have someone help you read instructions, pamphlets, or written material from your doctor or pharmacy?: Never (daughter supports care)  Health literacy needs impacting ability to self-manage diabetes?: No      Diabetes Self-Management Skills Assessment    Diabetes Disease Process/Treatment Options  Diabetes Disease Process/Treatment Options: Skills Assessment Completed: No  Deferred due to:: Time    Nutrition/Healthy Eating  Challenges to healthy eating::  (irregular meal patterns and decreased appetite)  Method of carbohydrate measurement:: no method  Patient can identify foods that  impact blood sugar.: yes (some)  Patient-identified foods:: starches (bread, pasta, rice, cereal), sweets, soda  Nutrition/Healthy Eating Skills Assessment Completed:: Yes  Assessment indicates:: Instruction Needed, Knowledge deficit  Area of need?: Yes    Physical Activity/Exercise  Patient's daily activity level:: sedentary  Patient formally exercises outside of work.: no  Reasons for not exercising:: physically unable to exercise currently (decreased energy and stamina)  Patient can identify forms of physical activity.: yes  Stated forms of physical activity:: any movement performed by muscles that uses energy  Patient can identify reasons why exercise/physical activity is important in diabetes management.: no  Physical Activity/Exercise Skills Assessment Completed: : Yes  Assessment indicates:: Instruction Needed, Knowledge deficit  Area of need?: Yes    Medications  Patient is able to describe current diabetes management routine.: yes  Diabetes management routine:: diet, insulin  Patient is able to identify current diabetes medications, dosages, and appropriate timing of medications.: no (dosing NPH after meals)  Patient understands the purpose of the medications taken for diabetes.: no  Patient reports problems or concerns with current medication regimen.: yes  Medication regimen problems/concerns:: does not feel like regimen is working, concerned about side effects  Medication Skills Assessment Completed:: Yes  Assessment indicates:: Knowledge deficit, Instruction Needed  Area of need?: Yes    Home Blood Glucose Monitoring  Patient states that blood sugar is checked at home daily.: yes  Monitoring Method:: home glucometer  How often do you check your blood sugar?: Twice a day  When you check what is your typical blood sugar range? : per recall of glucometer readings, fst 53-86, 148, random pm readings   Blood glucose logs:: yes, encouraged to bring logs to provider visits  Home Blood Glucose Monitoring  Skills Assessment Completed: : Yes  Assessment indicates:: Instruction Needed  Area of need?: Yes    Acute Complications  Patient is able to identify types of acute complications: No  Acute Complications Skills Assessment Completed: : Yes  Assessment indicates:: Instruction Needed, Knowledge deficit  Area of need?: Yes    Chronic Complications  Chronic Complications Skills Assessment Completed: : No  Deferred due to:: Time    Psychosocial/Coping  Patient can identify ways of coping with chronic disease.: yes  Patient-stated ways of coping with chronic disease:: support from loved ones  Psychosocial/Coping Skills Assessment Completed: : Yes  Assessment indicates:: Adequate understanding  Area of need?: No       Assessment Summary and Plan  Based on today's diabetes care assessment, the following areas of need were identified:      Social 9/6/2022   Support No   Access to Mass Media/Tech No   Cognitive/Behavioral Health No   Culture/Adventism No   Communication No   Health Literacy No        Clinical 9/6/2022   Medication Adherence No   Lab Compliance No   Nutritional Status Yes - see care plan        Diabetes Self-Management Skills 9/6/2022   Nutrition/Healthy Eating Yes - see care plan   Physical Activity/Exercise Deferred   Medication Yes - see care plan   Home Blood Glucose Monitoring Yes  Reviewed benefits, techniques of self-monitoring blood glucose. Discussed appropriate timing and frequency to SMBG, education on parameters on when to notify provider and advised patient to bring logs to all appts with PCP/Endocrinologist/Diabetes Care Specialist.    Acute Complications Yes  Discussed prevention, identification signs/symptoms and treatment of hyperglycemia and hypoglycemia and when to contact clinic.   Psychosocial/Coping No        Today's interventions were provided through individual discussion, instruction, and written materials were provided.    Patient verbalized understanding of instruction and written  materials.  Pt was able to return back demonstration of instructions today. Patient understood key points, needs reinforcement and further instruction.     Diabetes Self-Management Care Plan:  Today's Diabetes Self-Management Care Plan was developed with Avril's input. Avril has agreed to work toward the following goal(s) to improve his/her overall diabetes control.      Care Plan: Diabetes Management   Updates made since 8/7/2022 12:00 AM        Problem: Healthy Eating         Goal: Eat 3 meals, 2 snacks daily - manage carb 30-45grams/meal, 5-15grams/snack.    Start Date: 9/6/2022   Expected End Date: 9/6/2023   Priority: High   Barriers: No Barriers Identified        Task: Reviewed the sources and role of Carbohydrate, Protein, and Fat and how each nutrient impacts blood sugar. Completed 9/6/2022        Task: Provided visual examples using dry measuring cups, food models, and other familiar objects such as computer mouse, deck or cards, tennis ball etc. to help with visualization of portions. Completed 9/6/2022        Task: Review the importance of balancing carbohydrates with each meal using portion control techniques to count servings of carbohydrate and label reading to identify serving size and amount of total carbs per serving. Completed 9/6/2022        Task: Provided Sample plate method and reviewed the use of the plate to estimate amounts of carbohydrate per meal. Completed 9/6/2022        Problem: Medications         Goal: Patient Agrees to take Diabetes Medication(s) as prescribed.    Start Date: 9/6/2022   Expected End Date: 9/6/2023   Priority: Medium   Barriers: No Barriers Identified   Note:    Instructed pt and daughter to dose NPH 20-30min prior meal due to insulin action and prevention of pp hypoglycemia. Reviewed rule 15 for treating hypoglycemia. Pt and dtr verbalized understanding.     Encouraged pt to fu with provider Harshil for medical mgmt. Pt may need insulin reduction due to changes in  appetite, oral intake and weight loss. Recommendations forwarded to provider.       Task: Reviewed with patient all current diabetes medications and provided basic review of the purpose, dosage, frequency, side effects, and storage of both oral and injectable diabetes medications. Completed 9/6/2022        Task: Discussed guidelines for preventing, detecting and treating hypoglycemia and hyperglycemia and reviewed the importance of meal and medication timing with diabetes mediations for prevention of hypoglycemia and maximum drug benefit. Completed 9/6/2022          Follow Up Plan   Follow up in about 2 months (around 11/6/2022).  -Review BG logs  -Review A1C updates  -Eval goal progress  -Admin diabetes distress scale    Today's care plan and follow up schedule was discussed with patient.  Avril verbalized understanding of the care plan, goals, and agrees to follow up plan.        The patient was encouraged to communicate with his/her health care provider/physician and care team regarding his/her condition(s) and treatment.  I provided the patient with my contact information today and encouraged to contact me via phone or [facility] Patient Portal as needed.     Length of Visit   Total Time: 60 Minutes

## 2022-09-06 NOTE — Clinical Note
Robert Luz, I wanted to forward you update on Ms Mackenzie. She needed several areas reviewed, jayro medication dose timing, hypo treatment & meal plan. Since her appetite, oral intake and wt decreased, she may benefit from insulin decrease. Thanks, Conchita

## 2022-09-06 NOTE — PROGRESS NOTES
Pulmonary Outpatient Follow Up Visit     Subjective:       Patient ID: Avril Monzon is a 77 y.o. female.    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      Chest x-ray showed bilateral pleural effusions.      She is patient on dialysis for end-stage renal disease.      Review of Systems   Constitutional:  Positive for fatigue and weakness. Negative for fever and chills.   HENT:  Negative for nosebleeds.    Eyes:  Negative for redness.   Respiratory:  Positive for shortness of breath, orthopnea, previous hospitalization due to pulmonary problems, asthma nighttime symptoms and dyspnea on extertion. Negative for choking.    Cardiovascular:  Negative for chest pain.   Genitourinary:  Negative for hematuria.        End-stage renal disease on dialysis   Endocrine:  Negative for cold intolerance.    Musculoskeletal:  Positive for arthralgias.   Skin:  Negative for rash.   Gastrointestinal:  Negative for vomiting.   Neurological:  Negative for syncope.   Hematological:  Negative for adenopathy.   Psychiatric/Behavioral:  Negative for confusion.      Outpatient Encounter Medications as of 9/6/2022   Medication Sig Dispense Refill    ACCU-CHEK ARDEN PLUS METER Misc USE TO TEST TWICE DAILY  0    albuterol (PROVENTIL/VENTOLIN HFA) 90 mcg/actuation inhaler Inhale 2 puffs into the lungs every 6 (six) hours as needed for Shortness of Breath. 8 g 0    amoxicillin-clavulanate 500-125mg (AUGMENTIN) 500-125 mg Tab Take 1 tablet (500 mg total) by mouth every evening. for 10 days 10 tablet 0    ascorbic acid, vitamin C, (VITAMIN C) 500 MG tablet Take 1 tablet (500 mg total) by mouth once daily. 30 tablet 0    aspirin (ECOTRIN) 81 MG EC tablet Take 1 tablet (81 mg total) by mouth once daily. 90 tablet 1     azelastine (ASTELIN) 137 mcg (0.1 %) nasal spray 1 spray (137 mcg total) by Nasal route 2 (two) times daily. 30 mL 0    blood sugar diagnostic Strp 1 each by Misc.(Non-Drug; Combo Route) route 3 (three) times daily. 100 strip 11    cyanocobalamin (VITAMIN B-12) 1000 MCG tablet Take 100 mcg by mouth once daily.      famotidine (PEPCID) 20 MG tablet Take 1 tablet (20 mg total) by mouth once daily at 6am. 30 tablet 0    folic acid (FOLVITE) 1 MG tablet Take 1 mg by mouth once daily.      insulin NPH/Reg human (HUMULIN, 70/30,) 100 unit/mL (70-30) InPn pen To do 28 units subq in AM and 8 units subq in PM.  Dose to be done within 15 min before or after meal. (Patient taking differently: To do 28 units subq in AM and 8 units subq in PM.  Dose to be done within 15 min before or after meal.) 3 each 1    lancets Misc 1 each by Misc.(Non-Drug; Combo Route) route 3 (three) times daily. 100 each 11    metoprolol tartrate (LOPRESSOR) 50 MG tablet Take 1 tablet (50 mg total) by mouth 2 (two) times daily. 180 tablet 1    multivitamin Tab Take 1 tablet by mouth once daily. 30 tablet 0    semaglutide (OZEMPIC) 0.25 mg or 0.5 mg(2 mg/1.5 mL) pen injector Inject 0.25 mg into the skin every 7 days. (Patient not taking: Reported on 2022) 1 pen 1    sevelamer carbonate (RENVELA) 800 mg Tab TAKE 2 TABLETS BY MOUTH THREE TIMES DAILY 180 tablet 0    telmisartan (MICARDIS) 20 MG Tab Take 1 tablet (20 mg total) by mouth once daily. 90 tablet 1    traMADoL (ULTRAM) 50 mg tablet Take 1 tablet (50 mg total) by mouth every 8 (eight) hours as needed for Pain. 10 tablet 0    triamcinolone acetonide 0.1% (KENALOG) 0.1 % cream Apply topically 2 (two) times daily. 80 g 2    [] benzonatate (TESSALON) 200 MG capsule Take 1 capsule (200 mg total) by mouth 3 (three) times daily as needed for Cough. 30 capsule 0    clopidogreL (PLAVIX) 75 mg tablet Take 1 tablet (75 mg total) by mouth once daily. 90 tablet 3    [DISCONTINUED]  "isosorbide-hydrALAZINE 20-37.5 mg (BIDIL) 20-37.5 mg Tab Take 1 tablet by mouth 3 (three) times daily. 90 tablet 1     No facility-administered encounter medications on file as of 9/6/2022.       Objective:     Vital Signs (Most Recent)  Vital Signs  Pulse: 76  Resp: 17  SpO2: 97 %  BP: 112/70  Height and Weight  Height: 5' 5" (165.1 cm)  Weight: 57.1 kg (125 lb 14.1 oz)  BSA (Calculated - sq m): 1.62 sq meters  BMI (Calculated): 20.9  Weight in (lb) to have BMI = 25: 149.9]  Wt Readings from Last 2 Encounters:   09/06/22 57.1 kg (125 lb 14.1 oz)   09/01/22 57.1 kg (125 lb 14.1 oz)       Physical Exam   Constitutional: She is oriented to person, place, and time. She appears well-developed and well-nourished. No distress.   HENT:   Head: Normocephalic.   Cardiovascular: Normal rate.   Pulmonary/Chest: Normal expansion and effort normal. No stridor. No respiratory distress. She has decreased breath sounds. She exhibits no tenderness.   Breath sounds decreased bilaterally   Abdominal: She exhibits no distension.   Musculoskeletal:         General: Deformity present. No tenderness.      Cervical back: Neck supple.      Comments: Left upper extremity AVF   Lymphadenopathy:     She has no cervical adenopathy.   Neurological: She is alert and oriented to person, place, and time. Gait normal.   Skin: Skin is warm. No cyanosis. Nails show no clubbing.   Psychiatric: She has a normal mood and affect. Her behavior is normal. Judgment and thought content normal.   Nursing note and vitals reviewed.    Laboratory  Lab Results   Component Value Date    WBC 9.37 08/26/2022    RBC 2.42 (L) 08/26/2022    HGB 7.5 (L) 08/26/2022    HCT 23.8 (L) 08/26/2022    MCV 98 08/26/2022    MCH 31.0 08/26/2022    MCHC 31.5 (L) 08/26/2022    RDW 19.5 (H) 08/26/2022     08/26/2022    MPV 9.1 (L) 08/26/2022    GRAN 7.9 (H) 08/26/2022    GRAN 84.4 (H) 08/26/2022    LYMPH 0.8 (L) 08/26/2022    LYMPH 9.0 (L) 08/26/2022    MONO 0.4 08/26/2022    " MONO 4.7 08/26/2022    EOS 0.1 08/26/2022    BASO 0.03 08/26/2022    EOSINOPHIL 1.3 08/26/2022    BASOPHIL 0.3 08/26/2022       BMP  Lab Results   Component Value Date     (L) 08/26/2022    K 4.2 08/26/2022    CL 99 08/26/2022    CO2 23 08/26/2022    BUN 36 (H) 08/26/2022    CREATININE 5.0 (H) 08/26/2022    CALCIUM 9.2 08/26/2022    ANIONGAP 12 08/26/2022    ESTGFRAFRICA 8 (A) 07/27/2022    ESTGFRAFRICA 8 (A) 07/27/2022    EGFRNONAA 7 (A) 07/27/2022    EGFRNONAA 7 (A) 07/27/2022    AST 18 08/26/2022    ALT 12 08/26/2022    PROT 6.7 08/26/2022       Lab Results   Component Value Date    BNP >4,900 (H) 08/07/2022    BNP 2,742 (H) 12/20/2021    BNP 1,555 (H) 12/06/2021     (H) 07/03/2020    BNP 1,130 (H) 07/01/2020     (H) 03/26/2019       Lab Results   Component Value Date    TSH 1.792 01/26/2021       Lab Results   Component Value Date    SEDRATE 3 08/08/2022       Lab Results   Component Value Date    CRP 44.0 (H) 08/03/2022     Lab Results   Component Value Date    IGE 75 04/28/2022        Lab Results   Component Value Date    ASPERGILLUS <0.10 04/28/2022     Lab Results   Component Value Date    AFUMIGATUSCL CLASS 0 04/28/2022        No results found for: ACE     Diagnostic Results:  I have personally reviewed today the following studies:    Chest x-ray 09/01/2022       COMPARISON:  08/25/2022     FINDINGS:  Small to moderate-sized bilateral pleural effusions noted.  The fusions appear to be minimally larger when compared to the prior study.  The heart is enlarged.  There is evidence for prior median sternotomy.  There is some persistent mild prominence of the central pulmonary vasculature.    Assessment/Plan:   History of recent hospitalization    COVID-19 virus infection  -     Ambulatory referral/consult to Pulmonology    SOB (shortness of breath)  -     Ambulatory referral/consult to Pulmonology  -     Stress test, pulmonary; Future  -     Complete PFT without bronchodilator; Future  -      PULSE OXIMETRY OVERNIGHT; Future  -     BNP; Future; Expected date: 09/06/2022    Bilateral pleural effusion  -     Ambulatory referral/consult to Pulmonology  -     US Chest Mediastinum; Future; Expected date: 09/06/2022  -     BNP; Future; Expected date: 09/06/2022    ESRD (end stage renal disease)    Hx of CABG      Bilateral ultrasound to quantize pleural fluid volume and need for thoracentesis.      Continue volume contraction with dialysis.      BMP previously 4900.  Repeat BNP      Continue Micardis beta-blocker aspirin.  Plavix will have to be held for 5-7 days prior to thoracentesis if planned.      Patient states that she does have oxygen at home but she does not use it she thinks she has been discharged on oxygen from the hospital previously.  Will check with DME department.  Follow up in about 3 months (around 12/6/2022).    This note was prepared using voice recognition system and is likely to have sound alike errors that may have been overlooked even after proof reading.  Please call me with any questions    Discussed diagnosis, its evaluation, treatment and usual course. All questions answered.      Hattie Stephens MD

## 2022-09-06 NOTE — PROGRESS NOTES
2nd attempt to complete Social Work Assessment for OPCM; left message requesting a return call. LCSW will mail a letter with contact information requesting a return call.  OPCM RN notified.

## 2022-09-06 NOTE — TELEPHONE ENCOUNTER
Called patient daughter Elizabeth, No answer/LVM informing her of preferred alternatives and to give office a call back with decision.

## 2022-09-09 ENCOUNTER — TELEPHONE (OUTPATIENT)
Dept: PULMONOLOGY | Facility: CLINIC | Age: 77
End: 2022-09-09
Payer: MEDICARE

## 2022-09-09 ENCOUNTER — TELEPHONE (OUTPATIENT)
Dept: ADMINISTRATIVE | Facility: CLINIC | Age: 77
End: 2022-09-09
Payer: MEDICARE

## 2022-09-09 DIAGNOSIS — I50.32 CHRONIC DIASTOLIC HEART FAILURE: Primary | ICD-10-CM

## 2022-09-09 NOTE — TELEPHONE ENCOUNTER
Returned call to Jose with home health. Pt needs an order for transport wheelchair per daughter's request as she is unable to transport her safely. Order placed for transport wheelchair.

## 2022-09-09 NOTE — TELEPHONE ENCOUNTER
Spoke to pts daughter about testing an appts rescheduled for next available accepted and understood

## 2022-09-13 ENCOUNTER — IMMUNIZATION (OUTPATIENT)
Dept: PHARMACY | Facility: CLINIC | Age: 77
End: 2022-09-13
Payer: MEDICARE

## 2022-09-13 ENCOUNTER — OUTPATIENT CASE MANAGEMENT (OUTPATIENT)
Dept: ADMINISTRATIVE | Facility: OTHER | Age: 77
End: 2022-09-13
Payer: MEDICARE

## 2022-09-13 ENCOUNTER — OFFICE VISIT (OUTPATIENT)
Dept: DIABETES | Facility: CLINIC | Age: 77
End: 2022-09-13
Payer: MEDICARE

## 2022-09-13 ENCOUNTER — LAB VISIT (OUTPATIENT)
Dept: LAB | Facility: HOSPITAL | Age: 77
End: 2022-09-13
Attending: INTERNAL MEDICINE
Payer: MEDICARE

## 2022-09-13 VITALS
DIASTOLIC BLOOD PRESSURE: 76 MMHG | SYSTOLIC BLOOD PRESSURE: 151 MMHG | WEIGHT: 126.31 LBS | BODY MASS INDEX: 21.02 KG/M2 | HEART RATE: 71 BPM

## 2022-09-13 DIAGNOSIS — E11.59 HYPERTENSION ASSOCIATED WITH DIABETES: ICD-10-CM

## 2022-09-13 DIAGNOSIS — E11.22 TYPE 2 DIABETES MELLITUS WITH CHRONIC KIDNEY DISEASE ON CHRONIC DIALYSIS, WITH LONG-TERM CURRENT USE OF INSULIN: ICD-10-CM

## 2022-09-13 DIAGNOSIS — N18.6 ESRD (END STAGE RENAL DISEASE): ICD-10-CM

## 2022-09-13 DIAGNOSIS — Z99.2 TYPE 2 DIABETES MELLITUS WITH CHRONIC KIDNEY DISEASE ON CHRONIC DIALYSIS, WITH LONG-TERM CURRENT USE OF INSULIN: ICD-10-CM

## 2022-09-13 DIAGNOSIS — R06.02 SOB (SHORTNESS OF BREATH): ICD-10-CM

## 2022-09-13 DIAGNOSIS — E78.5 HYPERLIPIDEMIA ASSOCIATED WITH TYPE 2 DIABETES MELLITUS: ICD-10-CM

## 2022-09-13 DIAGNOSIS — Z79.4 TYPE 2 DIABETES MELLITUS WITH CHRONIC KIDNEY DISEASE ON CHRONIC DIALYSIS, WITH LONG-TERM CURRENT USE OF INSULIN: ICD-10-CM

## 2022-09-13 DIAGNOSIS — E11.69 HYPERLIPIDEMIA ASSOCIATED WITH TYPE 2 DIABETES MELLITUS: ICD-10-CM

## 2022-09-13 DIAGNOSIS — J90 BILATERAL PLEURAL EFFUSION: ICD-10-CM

## 2022-09-13 DIAGNOSIS — N18.6 TYPE 2 DIABETES MELLITUS WITH CHRONIC KIDNEY DISEASE ON CHRONIC DIALYSIS, WITH LONG-TERM CURRENT USE OF INSULIN: ICD-10-CM

## 2022-09-13 DIAGNOSIS — I15.2 HYPERTENSION ASSOCIATED WITH DIABETES: ICD-10-CM

## 2022-09-13 DIAGNOSIS — E11.51 TYPE 2 DIABETES MELLITUS WITH DIABETIC PERIPHERAL ANGIOPATHY WITHOUT GANGRENE, WITH LONG-TERM CURRENT USE OF INSULIN: Primary | ICD-10-CM

## 2022-09-13 DIAGNOSIS — Z86.73 HISTORY OF CVA (CEREBROVASCULAR ACCIDENT): ICD-10-CM

## 2022-09-13 DIAGNOSIS — Z79.4 TYPE 2 DIABETES MELLITUS WITH DIABETIC PERIPHERAL ANGIOPATHY WITHOUT GANGRENE, WITH LONG-TERM CURRENT USE OF INSULIN: Primary | ICD-10-CM

## 2022-09-13 LAB
BNP SERPL-MCNC: 2961 PG/ML (ref 0–99)
GLUCOSE SERPL-MCNC: 128 MG/DL (ref 70–110)

## 2022-09-13 PROCEDURE — 3078F DIAST BP <80 MM HG: CPT | Mod: CPTII,S$GLB,, | Performed by: NURSE PRACTITIONER

## 2022-09-13 PROCEDURE — 3066F NEPHROPATHY DOC TX: CPT | Mod: CPTII,S$GLB,, | Performed by: NURSE PRACTITIONER

## 2022-09-13 PROCEDURE — 3077F PR MOST RECENT SYSTOLIC BLOOD PRESSURE >= 140 MM HG: ICD-10-PCS | Mod: CPTII,S$GLB,, | Performed by: NURSE PRACTITIONER

## 2022-09-13 PROCEDURE — 95250 PR GLUCOSE MONITORING,72 HRS,SUB-Q SENSOR: ICD-10-PCS | Mod: S$GLB,,, | Performed by: NURSE PRACTITIONER

## 2022-09-13 PROCEDURE — 99214 OFFICE O/P EST MOD 30 MIN: CPT | Mod: S$GLB,,, | Performed by: NURSE PRACTITIONER

## 2022-09-13 PROCEDURE — 3046F PR MOST RECENT HEMOGLOBIN A1C LEVEL > 9.0%: ICD-10-PCS | Mod: CPTII,S$GLB,, | Performed by: NURSE PRACTITIONER

## 2022-09-13 PROCEDURE — 3046F HEMOGLOBIN A1C LEVEL >9.0%: CPT | Mod: CPTII,S$GLB,, | Performed by: NURSE PRACTITIONER

## 2022-09-13 PROCEDURE — 83880 ASSAY OF NATRIURETIC PEPTIDE: CPT | Performed by: INTERNAL MEDICINE

## 2022-09-13 PROCEDURE — 82962 POCT GLUCOSE, HAND-HELD DEVICE: ICD-10-PCS | Mod: S$GLB,,, | Performed by: NURSE PRACTITIONER

## 2022-09-13 PROCEDURE — 99999 PR PBB SHADOW E&M-EST. PATIENT-LVL V: CPT | Mod: PBBFAC,,, | Performed by: NURSE PRACTITIONER

## 2022-09-13 PROCEDURE — 95250 CONT GLUC MNTR PHYS/QHP EQP: CPT | Mod: S$GLB,,, | Performed by: NURSE PRACTITIONER

## 2022-09-13 PROCEDURE — 3078F PR MOST RECENT DIASTOLIC BLOOD PRESSURE < 80 MM HG: ICD-10-PCS | Mod: CPTII,S$GLB,, | Performed by: NURSE PRACTITIONER

## 2022-09-13 PROCEDURE — 95251 PR GLUCOSE MONITOR, 72 HOUR, PHYS INTERP: ICD-10-PCS | Mod: S$GLB,,, | Performed by: NURSE PRACTITIONER

## 2022-09-13 PROCEDURE — 99999 PR PBB SHADOW E&M-EST. PATIENT-LVL V: ICD-10-PCS | Mod: PBBFAC,,, | Performed by: NURSE PRACTITIONER

## 2022-09-13 PROCEDURE — 1101F PR PT FALLS ASSESS DOC 0-1 FALLS W/OUT INJ PAST YR: ICD-10-PCS | Mod: CPTII,S$GLB,, | Performed by: NURSE PRACTITIONER

## 2022-09-13 PROCEDURE — 3077F SYST BP >= 140 MM HG: CPT | Mod: CPTII,S$GLB,, | Performed by: NURSE PRACTITIONER

## 2022-09-13 PROCEDURE — 1126F AMNT PAIN NOTED NONE PRSNT: CPT | Mod: CPTII,S$GLB,, | Performed by: NURSE PRACTITIONER

## 2022-09-13 PROCEDURE — 1159F MED LIST DOCD IN RCRD: CPT | Mod: CPTII,S$GLB,, | Performed by: NURSE PRACTITIONER

## 2022-09-13 PROCEDURE — 99214 PR OFFICE/OUTPT VISIT, EST, LEVL IV, 30-39 MIN: ICD-10-PCS | Mod: S$GLB,,, | Performed by: NURSE PRACTITIONER

## 2022-09-13 PROCEDURE — 1159F PR MEDICATION LIST DOCUMENTED IN MEDICAL RECORD: ICD-10-PCS | Mod: CPTII,S$GLB,, | Performed by: NURSE PRACTITIONER

## 2022-09-13 PROCEDURE — 36415 COLL VENOUS BLD VENIPUNCTURE: CPT | Performed by: INTERNAL MEDICINE

## 2022-09-13 PROCEDURE — 3288F PR FALLS RISK ASSESSMENT DOCUMENTED: ICD-10-PCS | Mod: CPTII,S$GLB,, | Performed by: NURSE PRACTITIONER

## 2022-09-13 PROCEDURE — 95251 CONT GLUC MNTR ANALYSIS I&R: CPT | Mod: S$GLB,,, | Performed by: NURSE PRACTITIONER

## 2022-09-13 PROCEDURE — 4010F ACE/ARB THERAPY RXD/TAKEN: CPT | Mod: CPTII,S$GLB,, | Performed by: NURSE PRACTITIONER

## 2022-09-13 PROCEDURE — 1101F PT FALLS ASSESS-DOCD LE1/YR: CPT | Mod: CPTII,S$GLB,, | Performed by: NURSE PRACTITIONER

## 2022-09-13 PROCEDURE — 3066F PR DOCUMENTATION OF TREATMENT FOR NEPHROPATHY: ICD-10-PCS | Mod: CPTII,S$GLB,, | Performed by: NURSE PRACTITIONER

## 2022-09-13 PROCEDURE — 82962 GLUCOSE BLOOD TEST: CPT | Mod: S$GLB,,, | Performed by: NURSE PRACTITIONER

## 2022-09-13 PROCEDURE — 1126F PR PAIN SEVERITY QUANTIFIED, NO PAIN PRESENT: ICD-10-PCS | Mod: CPTII,S$GLB,, | Performed by: NURSE PRACTITIONER

## 2022-09-13 PROCEDURE — 4010F PR ACE/ARB THEARPY RXD/TAKEN: ICD-10-PCS | Mod: CPTII,S$GLB,, | Performed by: NURSE PRACTITIONER

## 2022-09-13 PROCEDURE — 3288F FALL RISK ASSESSMENT DOCD: CPT | Mod: CPTII,S$GLB,, | Performed by: NURSE PRACTITIONER

## 2022-09-13 RX ORDER — DEXTROSE 4 G
TABLET,CHEWABLE ORAL
Qty: 1 EACH | Refills: 0 | Status: SHIPPED | OUTPATIENT
Start: 2022-09-13 | End: 2023-11-13

## 2022-09-13 RX ORDER — LANCETS 33 GAUGE
1 EACH MISCELLANEOUS 3 TIMES DAILY
Qty: 100 EACH | Refills: 11 | Status: SHIPPED | OUTPATIENT
Start: 2022-09-13 | End: 2023-11-13

## 2022-09-13 NOTE — PROGRESS NOTES
PCP: Rose Parks MD    Subjective:     Chief Complaint: Diabetes follow up.  Last visit with me: 2022    HPI: 77 y.o.  female for diabetes follow up.   Previously managed by JACK Pierce.   Last clinic visit 2016.  Type II diabetes since age 44.  Comorbidities: HTN, HLD, CHF, ESRD, CVA, MI, Dialysis Use - Sched MWF 10-3 PM, Legally blind.  Daughter with patient during visit.  ESRD on dialysis x 2 years    Family History of Type 1/2 DM: mother  Known diabetes complications:  DKA/HHS: no  Retinopathy: yes  Peripheral neuropathy: yes  Nephropathy: yes    Interval history:  Admits to recent hospital admission s/p CABG 2022.  Recent Covid infection 8/3/2022  Admits to having hypoglycemia after started on Ozempic.  Endorses diabetes related symptoms: Tingling in Lower Extremities and Both fingers    Since last visit, pt started on Ozempic per Dr. Mejia.  Per daughter, after 2 weeks on Ozempic, BG readings dropping to 60's.  She was instructed to stop Ozempic.    Daughter also admits that dialysis schedule changed.  On dialysis days, MWF, pt does not give her am dose of NPH due to being on dialysis until about 2-3 pm.  Only gives insulin for evening dose.    Blood glucose monitoring: via CGM Piper Pro  Will be uploaded in media section.  Report generated as follows.        There are 7 low glucose events with average duration of 231 minutes.    Patient's CGM report interpretation: Overall pattern of euglycemia with few postprandial dinner hyperglycemia. Pattern of lows mid afternoon to early and mid evening.    Activity level: Sedentary  Meal Plannin meals/day; infrequent snacks/day.    Medication compliance all of the time, diet compliance most of the time.   To be enrolled in diabetes education classes.     Past failed treatment:  none    Current DM Medications:  Diabetes Medications               insulin NPH/Reg human (HUMULIN, 70/30,) 100 unit/mL (70-30) InPn pen To do 28 units  subq in AM and 8 units subq in PM.  Dose to be done within 15 min before or after meal.  Only taking 8 units ac dinner on dialysis days    semaglutide (OZEMPIC) 0.25 mg or 0.5 mg(2 mg/1.5 mL) pen injector Inject 0.25 mg into the skin every 7 days. On hold       Allergies  Review of patient's allergies indicates:   Allergen Reactions    Codeine Swelling    Darvon [propoxyphene] Swelling    Atorvastatin      Muscle twitching       Labs Reviewed:    Her most recent A1C is:  Lab Results   Component Value Date    HGBA1C 9.3 (H) 07/12/2022    HGBA1C 10.8 (H) 03/08/2022    HGBA1C 8.6 (H) 10/19/2021       Her most recent BMP is:  Lab Results   Component Value Date     (L) 08/26/2022    K 4.2 08/26/2022    CL 99 08/26/2022    CO2 23 08/26/2022    BUN 36 (H) 08/26/2022    CREATININE 5.0 (H) 08/26/2022    CALCIUM 9.2 08/26/2022    ANIONGAP 12 08/26/2022    ESTGFRAFRICA 8 (A) 07/27/2022    ESTGFRAFRICA 8 (A) 07/27/2022    EGFRNONAA 7 (A) 07/27/2022    EGFRNONAA 7 (A) 07/27/2022       No results found for: CPEPTIDE  No results found for: GLUTAMICACID    Body mass index is 21.02 kg/m².    Weight gain 0 lbs since last visit.  Wt Readings from Last 3 Encounters:   09/13/22 1133 57.3 kg (126 lb 5.2 oz)   09/06/22 1351 57.1 kg (125 lb 14.1 oz)   09/01/22 1122 57.1 kg (125 lb 14.1 oz)        Her blood sugar in clinic today is: 128    Diabetes Management Status  Statin: Not taking  ACE/ARB: Taking    Screening or Prevention Patient's value Goal Complete/Controlled?   HgA1C Testing and Control   Lab Results   Component Value Date    HGBA1C 9.3 (H) 07/12/2022      Annually/Less than 8% No     Lipid profile : 07/13/2022 Annually Yes     LDL control Lab Results   Component Value Date    LDLCALC 102.8 07/13/2022    Annually/Less than 100 mg/dl  No     Nephropathy screening Lab Results   Component Value Date    MICALBCREAT 1244.0 (H) 06/13/2018     Lab Results   Component Value Date    PROTEINUA 3+ (A) 08/26/2022    Annually Yes      Blood pressure BP Readings from Last 1 Encounters:   22 (!) 151/76    Less than 140/90 Yes     Dilated retinal exam : 2022 Annually Yes    Foot exam   : 2022 Annually Yes       Social History     Socioeconomic History    Marital status:    Tobacco Use    Smoking status: Former     Packs/day: 1.50     Years: 30.00     Pack years: 45.00     Types: Cigarettes     Quit date: 1990     Years since quittin.6    Smokeless tobacco: Former   Substance and Sexual Activity    Alcohol use: No     Alcohol/week: 0.0 standard drinks    Drug use: No    Sexual activity: Not Currently     Past Medical History:   Diagnosis Date    Acute hypoxemic respiratory failure 2018    Anemia     Arthritis     back, hand, knees    Back pain     Cataract     CKD stage G4/A3, GFR 15 - 29 and albumin creatinine ratio >300 mg/g     GFR 40% 2014 and 33% ub 3/2014 (BRG)    Coronary artery disease involving native coronary artery of native heart 2018    Diabetic retinopathy     DM (diabetes mellitus) type II controlled, neurological manifestation     Exudative age-related macular degeneration of left eye with active choroidal neovascularization 2019    GERD (gastroesophageal reflux disease)     Glaucoma     Heart failure     Hyperlipidemia     Hypertension     Non-ST elevation MI (NSTEMI) 2018    NSTEMI (non-ST elevated myocardial infarction) 2018    Peripheral neuropathy     Polyneuropathy     Proteinuria     >6 mo     Seizures     BRG 2014 -dick CT NRI showed small vessel    Stroke     Tobacco dependence     Type 2 diabetes with peripheral circulatory disorder, controlled      Review of Systems   Constitutional: Negative for activity change, appetite change, fatigue and unexpected weight change.   HENT: Negative for dental problem and trouble swallowing.    Eyes: Negative for visual disturbance.   Respiratory: Negative for chest tightness and shortness of breath.     Cardiovascular: Negative for chest pain, palpitations and leg swelling.   Gastrointestinal: Negative for abdominal pain, constipation, diarrhea, nausea and vomiting.   Endocrine: Negative for polydipsia, polyphagia and polyuria.   Genitourinary: Negative for difficulty urinating, dysuria, frequency and urgency.        Negative yeast infection   Musculoskeletal: Negative for gait problem, myalgias and neck pain.   Integumentary:  Negative for rash and wound.   Allergic/Immunologic: Negative.    Neurological: Positive for numbness (intermittent both feet). Negative for dizziness, syncope, weakness and headaches.        Int tingling BLE, hands   Hematological: Negative.    Psychiatric/Behavioral: Negative for confusion and sleep disturbance.   All other systems reviewed and are negative.     Objective:      Physical Exam  Vitals reviewed.   Constitutional:       Appearance: Normal appearance.   HENT:      Head: Normocephalic and atraumatic.   Eyes:      General: Vision grossly intact.      Extraocular Movements: Extraocular movements intact.      Pupils: Pupils are equal, round, and reactive to light.   Neck:      Thyroid: No thyroid mass or thyroid tenderness.   Cardiovascular:      Rate and Rhythm: Normal rate and regular rhythm.      Pulses: Normal pulses.           Radial pulses are 2+ on the right side and 2+ on the left side.        Dorsalis pedis pulses are 2+ on the right side and 2+ on the left side.      Heart sounds: Normal heart sounds.      Arteriovenous access: left arteriovenous access is present.     Comments: Left upper arm AVF + thrill, + bruit  Pulmonary:      Effort: Pulmonary effort is normal.      Breath sounds: Normal breath sounds.   Abdominal:      General: Bowel sounds are normal.      Palpations: Abdomen is soft.   Musculoskeletal:         General: Normal range of motion.      Cervical back: Normal range of motion and neck supple.      Right lower leg: No edema.      Left lower leg: No  edema.      Right foot: No deformity or bunion.      Left foot: No deformity or bunion.   Feet:      Right foot:      Skin integrity: Dry skin present. No ulcer, skin breakdown or callus.      Toenail Condition: Right toenails are abnormally thick.      Left foot:      Skin integrity: Dry skin present. No ulcer, skin breakdown or callus.      Toenail Condition: Left toenails are abnormally thick.   Lymphadenopathy:      Cervical: No cervical adenopathy.   Skin:     General: Skin is warm and dry.      Findings: No rash or wound.      Comments: Negative for acanthosis nigricans. Well-healed SQ injection sites.   Neurological:      Mental Status: She is alert and oriented to person, place, and time.      Coordination: Coordination is intact.      Gait: Gait is intact.   Psychiatric:         Attention and Perception: Attention normal.         Mood and Affect: Mood normal.         Speech: Speech normal.         Behavior: Behavior normal. Behavior is cooperative.         Thought Content: Thought content normal.         Cognition and Memory: Cognition normal.     Assessment / Plan:     Avril was seen today for general diabetes follow-up.    Diagnoses and all orders for this visit:    Type 2 diabetes mellitus with diabetic peripheral angiopathy without gangrene, with long-term current use of insulin  -     blood-glucose meter (TRUE METRIX GLUCOSE METER) Misc; Use as instructed  -     lancets (TRUEPLUS LANCETS) 33 gauge Misc; 1 lancet by Misc.(Non-Drug; Combo Route) route 3 (three) times daily.  -     blood sugar diagnostic (TRUE METRIX GLUCOSE TEST STRIP) Strp; 1 strip by Misc.(Non-Drug; Combo Route) route 3 (three) times daily.  -     POCT Glucose, Hand-Held Device  -     insulin NPH/Reg human (HUMULIN, 70/30,) 100 unit/mL (70-30) InPn pen; Administer 20 units before breakfast and 8 units before dinner. On dialysis days, 10 units before dinner.    Hypertension associated with diabetes    Hyperlipidemia associated with type  2 diabetes mellitus    History of CVA (cerebrovascular accident)    ESRD (end stage renal disease)      Additional Plan Details:  - Condition: uncontrolled, most recent A1c of 9.3% was completed 2 months ago.   - Monitor blood glucose:  2x daily.Goals reviewed. Maintain BG log. Pt would benefit from CGM use. Will order Piper 2 supplies.   - CGM note: Patient is testing 4 times per day. Patient is injecting meal time insulin 2 times daily. Patient requires CGM for blood sugar monitoring due to frequent insulin dose changes. Patient reports hypoglycemia, < 50, hypoglycemia unawareness, severe glucose excursions   - Hypoglycemia protocol: Reviewed and risk discussed.  Notify if BG readings below 80. Protocol printout provided.   - Diet: Limit carbohydrate intake 30-45 grams/meal, 3x daily and 15 grams/snack, limit 2/day.   - Activity: Recommend at least 150 minutes of exercise in a week.  - BP and LDL: Recommend lifestyle modifications and follow up with PCP for management.   - Medication Regimen:    - Continue Humulin 70/30   On dialysis days MWF, increase to 10 units before dinner  On non- dialysis days, decrease to 20 units before breakfast and 8 units before dinner   - Hold Ozempic for now    - Medication consideration: Would like to adjust insulin dose to prevent lows. Plan to resume Ozempic as soon as able.  - Lab results: A1C discussed.  - Health Maintenance standards addressed today:  A1c to be scheduled.   - Risks of uncontrolled DM explained. Importance of routine foot and eye exams reviewed. Scheduled as appropriate.  -The patient was explained the above plan and given opportunity to ask questions.  She understands, chooses and consents to this plan and accepts all the risks, which include but are not limited to the risks mentioned above.   - Labs ordered as above.  - CDE referral: Scheduled  - Follow up: 4-6 weeks.         Charlotte T. Delacruz, FNP-C [facility]    A total of 30 minutes  was spent in face to face time, of which over 50 % was spent in counseling patient on disease process, complications, treatment, and side effects of medications.

## 2022-09-13 NOTE — PROGRESS NOTES
Collaborating with OPCM RN-plan was to contact pt together since this LCSW has been unable to reach pt to complete assessment x 3 attempts. Pt had appt today therefore call to pt is re-scheduled.

## 2022-09-13 NOTE — PROGRESS NOTES
Outpatient Care Management  Plan of Care Follow Up Visit    Patient: Avril Monzon  MRN: [id number]  Date of Service: 09/13/2022  Completed by: Debbie Lopez RN  Referral Date: 12/20/2021    Reason for Visit   Patient presents with    OPCM Chart Review    OPCM RN First Follow-Up Attempt    OPCM RN Second Follow-Up Attempt    OPCM RN Third Follow-Up Attempt    Case Closure       Brief Summary: Unable to reach by phone after 3 attempts and letter mailed on 8/29/22 requesting return call. Will d/c from OPCM today related to unable to reach. Debbie Lopez RN    Patient Summary     Involvement of Care:  Do I have permission to speak with other family members about your care?       Patient Reported Labs & Vitals:  1.  Any Patient Reported Labs & Vitals?     2.  Patient Reported Blood Pressure:     3.  Patient Reported Pulse:     4.  Patient Reported Weight (Kg):     5.  Patient Reported Blood Glucose (mg/dl):       Medical and social history was reviewed with patient and/or caregiver.     Clinical Assessment     Reviewed and provided basic information on available community resources for mental health, transportation, wellness resources, and palliative care programs with patient and/or caregiver.     Complex Care Plan     Care plan was discussed and completed today with input from patient and/or caregiver.    Patient Instructions     Instructions were provided via the [facility] patient resources and are available for the patient to view on the patient portal.    Next Steps: D/C from OPCM today. Debbie Lopez RN    Follow up in about 3 weeks (around 10/4/2022) for address care plan tasks.    Todays OPCM Self-Management Care Plan was developed with the patients/caregivers input and was based on identified barriers from todays assessment.  Goals were written today with the patient/caregiver and the patient has agreed to work towards these goals to improve his/her overall well-being. Patient verbalized understanding  of the care plan, goals, and all of today's instructions. Encouraged patient/caregiver to communicate with his/her physician and health care team about health conditions and the treatment plan.  Provided my contact information today and encouraged patient/caregiver to call me with any questions as needed.9/13/22 - Mrs Mackenzie is scheduled for follow up contact from this CM today, but noted on chart review she has Diab NP and lab appts currently. Will attempt to follow up next week. Debbie Lopez RN

## 2022-09-13 NOTE — PATIENT INSTRUCTIONS
Medication Regimen:   - Continue Humulin 70/30   On dialysis days MWF, increase to 10 units before dinner  On non- dialysis days, decrease to 20 units before breakfast and 8 units before dinner  - Hold Ozempic for now    Patient Instructions:  Carbohydrate Count: 30-45 grams/meal and 15 grams/snack with limit of 2 snacks per day.  Exercise: Goal is 150 minutes or more per week.  Bring meter and blood sugar log to each appointment.     Goals for Blood Sugar:  Fastin-130 mg/dl  2 hours after meal:  mg/dl  Before Bed: 100-150 mg/dl  If Blood sugar is below 70 mg/dl, DO NOT take diabetes medication. Eat first and then recheck blood sugar in 20 minutes.  Foods to eat if blood sugar is below 70 mg/dl  1/2 glass of orange juice   1/2 regular cola can   3-4 hard candies   3-4 small glucose tabs.   Foods to eat if blood sugar is below 50 mg/dl  1 glass of orange juice  1 regular cola can  8 hard candies  8 small glucose tabs.   If you have repeated eating/blood sugar recheck process 2 times and blood sugar still below 70 mg/dl, call 911.

## 2022-09-15 ENCOUNTER — CLINICAL SUPPORT (OUTPATIENT)
Dept: PULMONOLOGY | Facility: CLINIC | Age: 77
End: 2022-09-15
Payer: MEDICARE

## 2022-09-15 ENCOUNTER — OFFICE VISIT (OUTPATIENT)
Dept: CARDIOTHORACIC SURGERY | Facility: CLINIC | Age: 77
End: 2022-09-15
Payer: MEDICARE

## 2022-09-15 VITALS
OXYGEN SATURATION: 100 % | HEART RATE: 68 BPM | WEIGHT: 126 LBS | BODY MASS INDEX: 20.99 KG/M2 | DIASTOLIC BLOOD PRESSURE: 62 MMHG | HEIGHT: 65 IN | SYSTOLIC BLOOD PRESSURE: 144 MMHG

## 2022-09-15 VITALS — BODY MASS INDEX: 20.99 KG/M2 | HEIGHT: 65 IN | WEIGHT: 126 LBS

## 2022-09-15 DIAGNOSIS — R06.02 SOB (SHORTNESS OF BREATH): ICD-10-CM

## 2022-09-15 DIAGNOSIS — Z95.1 S/P CABG (CORONARY ARTERY BYPASS GRAFT): Primary | ICD-10-CM

## 2022-09-15 PROCEDURE — 99999 PR PBB SHADOW E&M-EST. PATIENT-LVL I: ICD-10-PCS | Mod: PBBFAC,,,

## 2022-09-15 PROCEDURE — 3077F SYST BP >= 140 MM HG: CPT | Mod: CPTII,S$GLB,, | Performed by: THORACIC SURGERY (CARDIOTHORACIC VASCULAR SURGERY)

## 2022-09-15 PROCEDURE — 1126F AMNT PAIN NOTED NONE PRSNT: CPT | Mod: CPTII,S$GLB,, | Performed by: THORACIC SURGERY (CARDIOTHORACIC VASCULAR SURGERY)

## 2022-09-15 PROCEDURE — 99999 PR PBB SHADOW E&M-EST. PATIENT-LVL IV: CPT | Mod: PBBFAC,,, | Performed by: THORACIC SURGERY (CARDIOTHORACIC VASCULAR SURGERY)

## 2022-09-15 PROCEDURE — 3288F FALL RISK ASSESSMENT DOCD: CPT | Mod: CPTII,S$GLB,, | Performed by: THORACIC SURGERY (CARDIOTHORACIC VASCULAR SURGERY)

## 2022-09-15 PROCEDURE — 3288F PR FALLS RISK ASSESSMENT DOCUMENTED: ICD-10-PCS | Mod: CPTII,S$GLB,, | Performed by: THORACIC SURGERY (CARDIOTHORACIC VASCULAR SURGERY)

## 2022-09-15 PROCEDURE — 3066F NEPHROPATHY DOC TX: CPT | Mod: CPTII,S$GLB,, | Performed by: THORACIC SURGERY (CARDIOTHORACIC VASCULAR SURGERY)

## 2022-09-15 PROCEDURE — 94762 N-INVAS EAR/PLS OXIMTRY CONT: CPT | Mod: S$GLB,,, | Performed by: INTERNAL MEDICINE

## 2022-09-15 PROCEDURE — 94618 PULMONARY STRESS TESTING: ICD-10-PCS | Mod: S$GLB,,, | Performed by: INTERNAL MEDICINE

## 2022-09-15 PROCEDURE — 99999 PR PBB SHADOW E&M-EST. PATIENT-LVL I: CPT | Mod: PBBFAC,,,

## 2022-09-15 PROCEDURE — 3066F PR DOCUMENTATION OF TREATMENT FOR NEPHROPATHY: ICD-10-PCS | Mod: CPTII,S$GLB,, | Performed by: THORACIC SURGERY (CARDIOTHORACIC VASCULAR SURGERY)

## 2022-09-15 PROCEDURE — 99024 POSTOP FOLLOW-UP VISIT: CPT | Mod: S$GLB,,, | Performed by: THORACIC SURGERY (CARDIOTHORACIC VASCULAR SURGERY)

## 2022-09-15 PROCEDURE — 3078F PR MOST RECENT DIASTOLIC BLOOD PRESSURE < 80 MM HG: ICD-10-PCS | Mod: CPTII,S$GLB,, | Performed by: THORACIC SURGERY (CARDIOTHORACIC VASCULAR SURGERY)

## 2022-09-15 PROCEDURE — 1126F PR PAIN SEVERITY QUANTIFIED, NO PAIN PRESENT: ICD-10-PCS | Mod: CPTII,S$GLB,, | Performed by: THORACIC SURGERY (CARDIOTHORACIC VASCULAR SURGERY)

## 2022-09-15 PROCEDURE — 4010F PR ACE/ARB THEARPY RXD/TAKEN: ICD-10-PCS | Mod: CPTII,S$GLB,, | Performed by: THORACIC SURGERY (CARDIOTHORACIC VASCULAR SURGERY)

## 2022-09-15 PROCEDURE — 1101F PR PT FALLS ASSESS DOC 0-1 FALLS W/OUT INJ PAST YR: ICD-10-PCS | Mod: CPTII,S$GLB,, | Performed by: THORACIC SURGERY (CARDIOTHORACIC VASCULAR SURGERY)

## 2022-09-15 PROCEDURE — 1159F MED LIST DOCD IN RCRD: CPT | Mod: CPTII,S$GLB,, | Performed by: THORACIC SURGERY (CARDIOTHORACIC VASCULAR SURGERY)

## 2022-09-15 PROCEDURE — 4010F ACE/ARB THERAPY RXD/TAKEN: CPT | Mod: CPTII,S$GLB,, | Performed by: THORACIC SURGERY (CARDIOTHORACIC VASCULAR SURGERY)

## 2022-09-15 PROCEDURE — 3077F PR MOST RECENT SYSTOLIC BLOOD PRESSURE >= 140 MM HG: ICD-10-PCS | Mod: CPTII,S$GLB,, | Performed by: THORACIC SURGERY (CARDIOTHORACIC VASCULAR SURGERY)

## 2022-09-15 PROCEDURE — 99999 PR PBB SHADOW E&M-EST. PATIENT-LVL IV: ICD-10-PCS | Mod: PBBFAC,,, | Performed by: THORACIC SURGERY (CARDIOTHORACIC VASCULAR SURGERY)

## 2022-09-15 PROCEDURE — 3044F PR MOST RECENT HEMOGLOBIN A1C LEVEL <7.0%: ICD-10-PCS | Mod: CPTII,S$GLB,, | Performed by: THORACIC SURGERY (CARDIOTHORACIC VASCULAR SURGERY)

## 2022-09-15 PROCEDURE — 3078F DIAST BP <80 MM HG: CPT | Mod: CPTII,S$GLB,, | Performed by: THORACIC SURGERY (CARDIOTHORACIC VASCULAR SURGERY)

## 2022-09-15 PROCEDURE — 94762 PR NONINVASV OXYGEN SATUR, CONT: ICD-10-PCS | Mod: S$GLB,,, | Performed by: INTERNAL MEDICINE

## 2022-09-15 PROCEDURE — 1101F PT FALLS ASSESS-DOCD LE1/YR: CPT | Mod: CPTII,S$GLB,, | Performed by: THORACIC SURGERY (CARDIOTHORACIC VASCULAR SURGERY)

## 2022-09-15 PROCEDURE — 94618 PULMONARY STRESS TESTING: CPT | Mod: S$GLB,,, | Performed by: INTERNAL MEDICINE

## 2022-09-15 PROCEDURE — 3044F HG A1C LEVEL LT 7.0%: CPT | Mod: CPTII,S$GLB,, | Performed by: THORACIC SURGERY (CARDIOTHORACIC VASCULAR SURGERY)

## 2022-09-15 PROCEDURE — 99024 PR POST-OP FOLLOW-UP VISIT: ICD-10-PCS | Mod: S$GLB,,, | Performed by: THORACIC SURGERY (CARDIOTHORACIC VASCULAR SURGERY)

## 2022-09-15 PROCEDURE — 1159F PR MEDICATION LIST DOCUMENTED IN MEDICAL RECORD: ICD-10-PCS | Mod: CPTII,S$GLB,, | Performed by: THORACIC SURGERY (CARDIOTHORACIC VASCULAR SURGERY)

## 2022-09-15 NOTE — PROCEDURES
"[facility] - Pulmonary Function  Six Minute Walk     SUMMARY     Ordering Provider: Dr. Stephens   Interpreting Provider:   Performing nurse/tech/RT: KEVIN Gloria RRT  Diagnosis: Shortness of Breath  Height: 5' 5" (165.1 cm)  Weight: 57.2 kg (126 lb)  BMI (Calculated): 21   Patient Race:             Phase Oxygen Assessment Supplemental O2 Heart   Rate Blood Pressure Dylon Dyspnea Scale Rating   Resting 100 % Room Air 72 bpm 151/67 0   Exercise        Minute        1 96 % Room Air 78 bpm     2 96 % Room Air 73 bpm     3 94 % Room Air 75 bpm     4 95 % Room Air 74 bpm     5 96 % Room Air 75 bpm     6  98 % Room Air 73 bpm 164/73 2   Recovery        Minute        1 98 % Room Air 73 bpm     2 98 % Room Air 73 bpm     3 98 % Room Air 73 bpm     4 100 % Room Air 76 bpm 158/71 0     Six Minute Walk Summary  6MWT Status: completed with stops  Patient Reported: Dyspnea     Interpretation:  Did the patient stop or pause?: Yes  How many times did the patient stop or pause?: 1  Stop Time 1: 110  Restart Time 1: 320  Pause Time 1: 210 seconds                             Total Time Walked (Calculated): 150 seconds  Final Partial Lap Distance (feet): 125 feet  Total Distance Meters (Calculated): 38.1 meters  Predicted Distance Meters (Calculated): 450.98 meters  Percentage of Predicted (Calculated): 8.45  Peak VO2 (Calculated): 5.12  Mets: 1.46  Has The Patient Had a Previous Six Minute Walk Test?: No       Previous 6MWT Results  Has The Patient Had a Previous Six Minute Walk Test?: No    "

## 2022-09-15 NOTE — PROGRESS NOTES
Mrs. Monzon had difficulty performing and understanding instructions for complete PFT. Over 15 attempts of spirometry made with no success. Dr. Stephens notified and informed me to cancel complete PFT

## 2022-09-15 NOTE — PROGRESS NOTES
"Subjective:      Patient ID: Avril Monzon is a 77 y.o. female.    Chief Complaint: No chief complaint on file.    HPI:  77-year-old female with a history of CABG x2 end-stage renal disease on dialysis severe pulmonary hypertension is here for the 2nd follow-up visit after her surgery.  Does not have any complaints with her healing.  The patient is getting weekly 3 times dialysis and also she was diagnosed with COVID-19 and she is recovering from that.  She feels generalized weakness and difficulty in breathing.    Review of patient's allergies indicates:   Allergen Reactions    Codeine Swelling    Darvon [propoxyphene] Swelling    Atorvastatin      Muscle twitching       Review of Systems   Constitutional:  Positive for fatigue. Negative for activity change and appetite change.   HENT:  Negative for dental problem, nosebleeds and sore throat.    Eyes:  Negative for discharge and visual disturbance.   Respiratory:  Positive for cough and shortness of breath. Negative for chest tightness and stridor.    Cardiovascular:  Positive for leg swelling.   Gastrointestinal:  Negative for abdominal distention and abdominal pain.   Genitourinary:  Negative for difficulty urinating and dysuria.   Musculoskeletal:  Positive for myalgias. Negative for arthralgias, back pain and joint swelling.   Allergic/Immunologic: Negative for environmental allergies.   Neurological:  Negative for dizziness, syncope and headaches.   Hematological:  Does not bruise/bleed easily.   Psychiatric/Behavioral:  Negative for behavioral problems.         Objective:   BP (!) 144/62   Pulse 68   Ht 5' 5" (1.651 m)   Wt 57.2 kg (126 lb)   LMP  (LMP Unknown)   SpO2 100%   BMI 20.97 kg/m²     X-Ray Chest PA And Lateral  Narrative: EXAMINATION:  XR CHEST PA AND LATERAL    CLINICAL HISTORY:  COVID-19    TECHNIQUE:  PA and lateral views of the chest were performed.    COMPARISON:  08/25/2022    FINDINGS:  Small to moderate-sized bilateral pleural " effusions noted.  The fusions appear to be minimally larger when compared to the prior study.  The heart is enlarged.  There is evidence for prior median sternotomy.  There is some persistent mild prominence of the central pulmonary vasculature.  Impression: As above    Electronically signed by: Deniz Cruz DO  Date:    09/01/2022  Time:    13:05  Color Fundus Photography - OU - Both Eyes  See progress note.  Posterior Segment OCT Retina-Both eyes  Right Eye  Quality was good. Scan locations included subfoveal, juxtafoveal,   extrafoveal, nasal, temporal, superior, inferior. Progression has been   stable. Findings include normal foveal contour.     Left Eye  Quality was good. Scan locations included subfoveal, juxtafoveal,   extrafoveal, nasal, temporal, superior, inferior. Progression has been   stable. Findings include normal foveal contour.     Notes  See progress note.         Physical Exam  Vitals and nursing note reviewed.   Constitutional:       Appearance: Normal appearance.      Comments: She is wheelchair-bound from old age and frailty   HENT:      Head: Normocephalic.      Mouth/Throat:      Mouth: Mucous membranes are moist.   Eyes:      Extraocular Movements: Extraocular movements intact.      Pupils: Pupils are equal, round, and reactive to light.   Cardiovascular:      Rate and Rhythm: Normal rate and regular rhythm.      Comments: The chest incision has healed well sternum is stable  Pulmonary:      Effort: Pulmonary effort is normal.      Breath sounds: Normal breath sounds.   Abdominal:      Palpations: Abdomen is soft.   Musculoskeletal:         General: Normal range of motion.      Cervical back: Normal range of motion and neck supple.      Right lower leg: Edema present.      Left lower leg: Edema present.      Comments: Leg incisions have healed well   Skin:     General: Skin is warm.      Capillary Refill: Capillary refill takes less than 2 seconds.   Neurological:      General: No focal  deficit present.      Mental Status: She is alert and oriented to person, place, and time.   Psychiatric:         Mood and Affect: Mood normal.       Assessment:     1. S/P CABG (coronary artery bypass graft)    End-stage renal disease  On dialysis  Hypertension  Pulmonary hypertension  Frailty      Plan   Continue outpatient cardiopulmonary rehabilitation  I will discharge the patient from my care today  Follow-up with Cardiology and Pulmonary          Sanju Locke MD  [facility]  Rhonda Vazquez

## 2022-09-16 ENCOUNTER — TELEPHONE (OUTPATIENT)
Dept: CARDIAC REHAB | Facility: HOSPITAL | Age: 77
End: 2022-09-16
Payer: MEDICARE

## 2022-09-16 NOTE — TELEPHONE ENCOUNTER
Spoke with pt daughter Elizabeth regarding starting cardiac rehab.  Explained that cardiac rehab consists of 36 sessions with 45-60 minutes of exercise each session and a weekly 30 minute education session.  Daughter informed of available sessions.  Elizabeth stated that the pt has HD MWF 10A-3P and would not be able to make it on Fridays.  Reinforced that program calls for 3 days/week  sessions.  Daughter stated that she would reach out to HD clinic on Monday to see if the pt can switch Friday HD to a Saturday.  Instructed daughter to call cardiac rehab back when available to start.  Daughter. verbalized understanding

## 2022-09-19 NOTE — PROCEDURES
[id number] * [place] [address]  Telephone: [phone]  Test date: 09/15/22 Start: 09/15/22 23:49:18 Avril Monzon  Doctor: Dr. Stephens End: 22 06:11:34 [id number]  Oximetry: Summary Report  Comments: Room air.  Recording time: 06:22:16 Highest pulse: 124 Highest SpO2: 99%  Excluded samplin:54:52 Lowest pulse: 66 Lowest SpO2: 78%  Total valid samplin:27:24 Mean pulse: 73 Mean SpO2: 92.4%  1 S.D.: 3.2 1 S.D.: 2.8  Time with SpO2<90: 0:49:40, 15.2%  Time with SpO2<80: 0:00:04, 0.0%  Time with SpO2<70: 0:00:00, 0.0%  Time with SpO2<60: 0:00:00, 0.0%  Time with SpO2<89: 0:27:52, 8.5%  Time with SpO2 =>90: 4:37:44, 84.8%  Time with SpO2=>80 & <90: 0:49:36, 15.1%  Time with SpO2=>70 & <80: 0:00:04, 0.0%  Time with SpO2=>60 & <70: 0:00:00, 0.0%  The longest continuous time with saturation <=88 was 00:03:28, which started at  22 03:25:14.  A desaturation event was defined as a decrease of saturation by 4 or more.  3 events were excluded due to artifact.  There were 14 desaturation events over 3 minutes duration.  There were 39 desaturation events of less than 3 minutes duration during which:  The mean high was 94.7%. The mean low was 88.2%.  The number of these events that were:  > 0 & <10 seconds: 1 > 0 seconds: 39  =>10 & <20 seconds: 9 =>10 seconds: 38  =>20 & <30 seconds: 12 =>20 seconds: 29  =>30 & <40 seconds: 4 =>30 seconds: 17  =>40 & <50 seconds: 1 =>40 seconds: 13  =>50 & <60 seconds: 0 =>50 seconds: 12  =>60 seconds: 12 =>60 seconds: 12  The mean length of desaturation events that were >=10 sec & <=3 mins was: 51.2 sec.  Desaturation event index (events >=10 sec per sampled hour): 7.0  Desaturation event index (events >= 0 sec per sampled hour): 7.1  ÂHilario -  
Length Of Therapy: 3 months
Initial Quantiferon Gold (Optional): 6/2020
Detail Level: Zone
Add High Risk Medication Management Associated Diagnosis?: Yes
Patient Reported Weight(Optional But Include Units): 208

## 2022-09-21 ENCOUNTER — TELEPHONE (OUTPATIENT)
Dept: ADMINISTRATIVE | Facility: HOSPITAL | Age: 77
End: 2022-09-21
Payer: MEDICARE

## 2022-09-26 DIAGNOSIS — I50.9 CONGESTIVE HEART FAILURE, UNSPECIFIED HF CHRONICITY, UNSPECIFIED HEART FAILURE TYPE: Primary | ICD-10-CM

## 2022-09-26 PROCEDURE — G0179 PR HOME HEALTH MD RECERTIFICATION: ICD-10-PCS | Mod: ,,, | Performed by: INTERNAL MEDICINE

## 2022-09-26 PROCEDURE — G0179 MD RECERTIFICATION HHA PT: HCPCS | Mod: ,,, | Performed by: INTERNAL MEDICINE

## 2022-09-27 ENCOUNTER — DOCUMENT SCAN (OUTPATIENT)
Dept: HOME HEALTH SERVICES | Facility: HOSPITAL | Age: 77
End: 2022-09-27
Payer: MEDICARE

## 2022-10-04 ENCOUNTER — OFFICE VISIT (OUTPATIENT)
Dept: PRIMARY CARE CLINIC | Facility: CLINIC | Age: 77
End: 2022-10-04
Payer: MEDICARE

## 2022-10-04 VITALS
DIASTOLIC BLOOD PRESSURE: 56 MMHG | HEART RATE: 65 BPM | BODY MASS INDEX: 19.68 KG/M2 | HEIGHT: 64 IN | TEMPERATURE: 98 F | OXYGEN SATURATION: 100 % | WEIGHT: 115.31 LBS | SYSTOLIC BLOOD PRESSURE: 116 MMHG

## 2022-10-04 DIAGNOSIS — N18.6 ESRD (END STAGE RENAL DISEASE): ICD-10-CM

## 2022-10-04 DIAGNOSIS — R23.4 ESCHAR OF TOE: Primary | ICD-10-CM

## 2022-10-04 DIAGNOSIS — R26.81 UNSTEADY GAIT WHEN WALKING: ICD-10-CM

## 2022-10-04 PROCEDURE — 3066F NEPHROPATHY DOC TX: CPT | Mod: CPTII,S$GLB,, | Performed by: FAMILY MEDICINE

## 2022-10-04 PROCEDURE — 1126F AMNT PAIN NOTED NONE PRSNT: CPT | Mod: CPTII,S$GLB,, | Performed by: FAMILY MEDICINE

## 2022-10-04 PROCEDURE — 1123F PR ADV CARE PLAN DISCUSSED, PLAN OR SURROGATE DOCUMENTED: ICD-10-PCS | Mod: CPTII,S$GLB,, | Performed by: FAMILY MEDICINE

## 2022-10-04 PROCEDURE — 3288F FALL RISK ASSESSMENT DOCD: CPT | Mod: CPTII,S$GLB,, | Performed by: FAMILY MEDICINE

## 2022-10-04 PROCEDURE — 3074F SYST BP LT 130 MM HG: CPT | Mod: CPTII,S$GLB,, | Performed by: FAMILY MEDICINE

## 2022-10-04 PROCEDURE — 3078F PR MOST RECENT DIASTOLIC BLOOD PRESSURE < 80 MM HG: ICD-10-PCS | Mod: CPTII,S$GLB,, | Performed by: FAMILY MEDICINE

## 2022-10-04 PROCEDURE — 1101F PR PT FALLS ASSESS DOC 0-1 FALLS W/OUT INJ PAST YR: ICD-10-PCS | Mod: CPTII,S$GLB,, | Performed by: FAMILY MEDICINE

## 2022-10-04 PROCEDURE — 1160F PR REVIEW ALL MEDS BY PRESCRIBER/CLIN PHARMACIST DOCUMENTED: ICD-10-PCS | Mod: CPTII,S$GLB,, | Performed by: FAMILY MEDICINE

## 2022-10-04 PROCEDURE — 3074F PR MOST RECENT SYSTOLIC BLOOD PRESSURE < 130 MM HG: ICD-10-PCS | Mod: CPTII,S$GLB,, | Performed by: FAMILY MEDICINE

## 2022-10-04 PROCEDURE — 1126F PR PAIN SEVERITY QUANTIFIED, NO PAIN PRESENT: ICD-10-PCS | Mod: CPTII,S$GLB,, | Performed by: FAMILY MEDICINE

## 2022-10-04 PROCEDURE — 99214 OFFICE O/P EST MOD 30 MIN: CPT | Mod: 25,S$GLB,, | Performed by: FAMILY MEDICINE

## 2022-10-04 PROCEDURE — 3078F DIAST BP <80 MM HG: CPT | Mod: CPTII,S$GLB,, | Performed by: FAMILY MEDICINE

## 2022-10-04 PROCEDURE — 3044F PR MOST RECENT HEMOGLOBIN A1C LEVEL <7.0%: ICD-10-PCS | Mod: CPTII,S$GLB,, | Performed by: FAMILY MEDICINE

## 2022-10-04 PROCEDURE — 4010F ACE/ARB THERAPY RXD/TAKEN: CPT | Mod: CPTII,S$GLB,, | Performed by: FAMILY MEDICINE

## 2022-10-04 PROCEDURE — 1159F PR MEDICATION LIST DOCUMENTED IN MEDICAL RECORD: ICD-10-PCS | Mod: CPTII,S$GLB,, | Performed by: FAMILY MEDICINE

## 2022-10-04 PROCEDURE — 99497 PR ADVNCD CARE PLAN 30 MIN: ICD-10-PCS | Mod: S$GLB,,, | Performed by: FAMILY MEDICINE

## 2022-10-04 PROCEDURE — 1160F RVW MEDS BY RX/DR IN RCRD: CPT | Mod: CPTII,S$GLB,, | Performed by: FAMILY MEDICINE

## 2022-10-04 PROCEDURE — 3044F HG A1C LEVEL LT 7.0%: CPT | Mod: CPTII,S$GLB,, | Performed by: FAMILY MEDICINE

## 2022-10-04 PROCEDURE — 99499 UNLISTED E&M SERVICE: CPT | Mod: HCNC,S$GLB,, | Performed by: FAMILY MEDICINE

## 2022-10-04 PROCEDURE — 99497 ADVNCD CARE PLAN 30 MIN: CPT | Mod: S$GLB,,, | Performed by: FAMILY MEDICINE

## 2022-10-04 PROCEDURE — 4010F PR ACE/ARB THEARPY RXD/TAKEN: ICD-10-PCS | Mod: CPTII,S$GLB,, | Performed by: FAMILY MEDICINE

## 2022-10-04 PROCEDURE — 3288F PR FALLS RISK ASSESSMENT DOCUMENTED: ICD-10-PCS | Mod: CPTII,S$GLB,, | Performed by: FAMILY MEDICINE

## 2022-10-04 PROCEDURE — 1101F PT FALLS ASSESS-DOCD LE1/YR: CPT | Mod: CPTII,S$GLB,, | Performed by: FAMILY MEDICINE

## 2022-10-04 PROCEDURE — 99999 PR PBB SHADOW E&M-EST. PATIENT-LVL IV: ICD-10-PCS | Mod: PBBFAC,,,

## 2022-10-04 PROCEDURE — 99214 PR OFFICE/OUTPT VISIT, EST, LEVL IV, 30-39 MIN: ICD-10-PCS | Mod: 25,S$GLB,, | Performed by: FAMILY MEDICINE

## 2022-10-04 PROCEDURE — 1159F MED LIST DOCD IN RCRD: CPT | Mod: CPTII,S$GLB,, | Performed by: FAMILY MEDICINE

## 2022-10-04 PROCEDURE — 3066F PR DOCUMENTATION OF TREATMENT FOR NEPHROPATHY: ICD-10-PCS | Mod: CPTII,S$GLB,, | Performed by: FAMILY MEDICINE

## 2022-10-04 PROCEDURE — 1123F ACP DISCUSS/DSCN MKR DOCD: CPT | Mod: CPTII,S$GLB,, | Performed by: FAMILY MEDICINE

## 2022-10-04 PROCEDURE — 99999 PR PBB SHADOW E&M-EST. PATIENT-LVL IV: CPT | Mod: PBBFAC,,,

## 2022-10-04 RX ORDER — SEVELAMER CARBONATE 800 MG/1
1600 TABLET, FILM COATED ORAL 3 TIMES DAILY
Qty: 180 TABLET | Refills: 0
Start: 2022-10-04 | End: 2023-01-06 | Stop reason: SDUPTHER

## 2022-10-04 NOTE — PROGRESS NOTES
Subjective:       Patient ID: Avril Monzon is a 77 y.o. female.    Chief Complaint: after hospital visit (Pt was discharged from [facility] on 9/21/22 for shortness of breath. Pt three toes on her right foot have black scabs on them, says it was from hospital putting tape on her toes.)    77 y.o AAF with ESRD on HD MWF @ [facility] seen for hospital f/u. Admitted to [facility] and discharged 9/21/22 with acute respiratory failure secondary to pulmonary edema; resolved with diuresis. She has been compliant with HD but was informed she wasn't having enough fluid pulled off. She has had gradual decline in general wellbeing and functional capacity since heart surgery in July followed by COVID infection, post-COVID syndrome and weight loss. Her daughter who is her primary caregiver joins her and contributes to history. Both report she is doing much better since discharge from the hospital. Appetite and intake have improved. Her daughter notes that she naps frequently and is often fatigued. Blood pressure has consistently run low per patient in the hospital and at home but she is not lightheaded and heart rate is normal.     Only med changes in the hospital have been from ozempic to insulin glargine, which she is tolerating well. No hypoglycemic episodes. Daily iron supplement also added on discharge to address anemia. No recent blood transfusion.       does not have any pertinent problems on file.  Past Medical History:   Diagnosis Date    Acute hypoxemic respiratory failure 11/26/2018    Anemia     Arthritis     back, hand, knees    Back pain     Cataract     CKD stage G4/A3, GFR 15 - 29 and albumin creatinine ratio >300 mg/g     GFR 40% Jan 2014 and 33% ub 3/2014 ([facility])    Coronary artery disease involving native coronary artery of native heart 11/30/2018    Diabetic retinopathy     DM (diabetes mellitus) type II controlled, neurological manifestation     Exudative age-related macular  degeneration of left eye with active choroidal neovascularization 2/5/2019    GERD (gastroesophageal reflux disease)     Glaucoma     Heart failure     Hyperlipidemia     Hypertension     Non-ST elevation MI (NSTEMI) 11/28/2018    NSTEMI (non-ST elevated myocardial infarction) 11/27/2018    Peripheral neuropathy     Polyneuropathy     Proteinuria     >6 mo     Seizures     BRG 1/2014 -dick CT NRI showed small vessel    Stroke 2012,2014    Tobacco dependence     Type 2 diabetes with peripheral circulatory disorder, controlled      Past Surgical History:   Procedure Laterality Date    BREAST LUMPECTOMY Left     benign, when patient was 15 years old    BREAST MASS EXCISION      ,benign, age 15.    CATHETERIZATION OF BOTH LEFT AND RIGHT HEART N/A 11/28/2018    Procedure: CATHETERIZATION, HEART, BOTH LEFT AND RIGHT;  Surgeon: Michelle Holman MD;  Location: [facility] CATH LAB;  Service: Cardiology;  Laterality: N/A;    CATHETERIZATION OF BOTH LEFT AND RIGHT HEART N/A 11/30/2018    Procedure: CATHETERIZATION, HEART, BOTH LEFT AND RIGHT;  Surgeon: Michelle Holman MD;  Location: [facility] CATH LAB;  Service: Cardiology;  Laterality: N/A;    CORONARY ARTERY BYPASS GRAFT      CORONARY ARTERY BYPASS GRAFT (CABG) N/A 7/20/2022    Procedure: CORONARY ARTERY BYPASS GRAFT (CABG);  Surgeon: Sanju Locke MD;  Location: [facility] OR;  Service: Cardiothoracic;  Laterality: N/A;  2-VESSEL PUMP ASSIST WITH EPI-AORTIC ULTRASOUND    ENDOSCOPIC HARVEST OF VEIN Left 7/20/2022    Procedure: SURGICAL PROCUREMENT, VEIN, ENDOSCOPIC;  Surgeon: Sanju Locke MD;  Location: [facility] OR;  Service: Cardiothoracic;  Laterality: Left;    HYSTERECTOMY  1982    INJECTION OF ANESTHETIC AGENT AROUND MULTIPLE INTERCOSTAL NERVES N/A 7/20/2022    Procedure: BLOCK, NERVE, INTERCOSTAL, 2 OR MORE;  Surgeon: Sanju Locke MD;  Location: [facility] OR;  Service: Cardiothoracic;  Laterality: N/A;  PARASTERNAL NERVE BLOCK    INSERTION OF SHUNT      LEFT  HEART CATHETERIZATION Left 12/3/2018    Procedure: CATHETERIZATION, HEART, LEFT;  Surgeon: Michelle Holman MD;  Location: [facility] CATH LAB;  Service: Cardiology;  Laterality: Left;  LAD ATHERECTOMY STENT/ FEMORAL APPROACH    LEFT HEART CATHETERIZATION Left 2022    Procedure: CATHETERIZATION, HEART, LEFT;  Surgeon: Abhi Sloan MD;  Location: [facility] CATH LAB;  Service: Cardiology;  Laterality: Left;    OOPHORECTOMY      wrist cyst Right 1980s    dorsal wrist cyst     Family History   Problem Relation Age of Onset    Diabetes Mother     Hyperlipidemia Mother     Hypertension Mother     Heart disease Mother         MI    Muscular dystrophy Son     Cancer Maternal Grandmother         colon     Social History     Socioeconomic History    Marital status:    Tobacco Use    Smoking status: Former     Packs/day: 1.50     Years: 30.00     Pack years: 45.00     Types: Cigarettes     Quit date: 1990     Years since quittin.7    Smokeless tobacco: Former   Substance and Sexual Activity    Alcohol use: No     Alcohol/week: 0.0 standard drinks    Drug use: No    Sexual activity: Not Currently     Objective:     Vitals:    10/04/22 1317   BP: (!) 116/56   Pulse: 65   Temp: 97.9 °F (36.6 °C)        Physical Exam  Vitals and nursing note reviewed.   Constitutional:       Appearance: She is well-developed.      Comments: Thin, frail    HENT:      Head: Normocephalic and atraumatic.   Eyes:      Pupils: Pupils are equal, round, and reactive to light.   Cardiovascular:      Rate and Rhythm: Normal rate and regular rhythm.   Pulmonary:      Effort: Pulmonary effort is normal.      Breath sounds: Normal breath sounds.   Musculoskeletal:         General: No deformity. Normal range of motion.      Cervical back: Normal range of motion and neck supple.   Skin:     General: Skin is warm and dry.      Comments: Right great toe with large eschar present; surrounding skin warm and supple with dp pulse  present and cap refill < 2 seconds   Neurological:      Mental Status: She is alert and oriented to person, place, and time.   Psychiatric:         Behavior: Behavior normal.         Advance Care Planning   Advance Directives:   Living Will: Yes (no artificial life support if no hope for meaningful recovery; tube feeding acceptable only on a short term basis (5 days per patient))        Copy on chart: Yes    Medical Power of : Yes    Agent's Name:  Medical decision maker is her daughter Elizabeth Monzon [phone].    Decision Making:  Patient answered questions       Assessment:       1. Eschar of toe    2. Unsteady gait when walking    3. ESRD (end stage renal disease)        Plan:           Problem List Items Addressed This Visit          Renal/    ESRD (end stage renal disease)    Overview     Hx of unctontrolled HTN and DMII : Admission records reviewed from [facility] in Jan 2014 and March 2014 ; Creatinine was 1.4 in Jan 2014 and had proteinuria c/w HTN and DMII ; since then progressive decline in GFR with UTI from E.Coli ; USD in 9/2014 show chronic Medical Disease          Relevant Orders    WALKER FOR HOME USE     Other Visit Diagnoses       Eschar of toe    -  Primary    referred to podiatry for assessment and possible debridement    Relevant Orders    Ambulatory referral/consult to Podiatry    Unsteady gait when walking        Relevant Orders    WALKER FOR HOME USE          Total face to face time spent was 45 minutes with >50 % spent counseling regarding diagnosis, risks and benefits of various treatment plans and expected outcomes. We discussed lifestyle modifications that would be beneficial to health.      An additional 20 minutes were spent in ACP education, discussion, and document completion.

## 2022-10-11 ENCOUNTER — OFFICE VISIT (OUTPATIENT)
Dept: PODIATRY | Facility: CLINIC | Age: 77
End: 2022-10-11
Payer: MEDICARE

## 2022-10-11 VITALS — WEIGHT: 115 LBS | BODY MASS INDEX: 19.63 KG/M2 | HEIGHT: 64 IN

## 2022-10-11 DIAGNOSIS — R23.4 ESCHAR OF TOE: ICD-10-CM

## 2022-10-11 DIAGNOSIS — E11.42 CONTROLLED TYPE 2 DIABETES MELLITUS WITH DIABETIC POLYNEUROPATHY, WITH LONG-TERM CURRENT USE OF INSULIN: ICD-10-CM

## 2022-10-11 DIAGNOSIS — B35.1 DERMATOPHYTOSIS OF NAIL: Primary | ICD-10-CM

## 2022-10-11 DIAGNOSIS — Z79.4 CONTROLLED TYPE 2 DIABETES MELLITUS WITH DIABETIC POLYNEUROPATHY, WITH LONG-TERM CURRENT USE OF INSULIN: ICD-10-CM

## 2022-10-11 PROCEDURE — 99499 UNLISTED E&M SERVICE: CPT | Mod: S$GLB,,, | Performed by: PODIATRIST

## 2022-10-11 PROCEDURE — 1101F PT FALLS ASSESS-DOCD LE1/YR: CPT | Mod: CPTII,S$GLB,, | Performed by: PODIATRIST

## 2022-10-11 PROCEDURE — 11721 PR DEBRIDEMENT OF NAILS, 6 OR MORE: ICD-10-PCS | Mod: 59,Q8,S$GLB, | Performed by: PODIATRIST

## 2022-10-11 PROCEDURE — 1101F PR PT FALLS ASSESS DOC 0-1 FALLS W/OUT INJ PAST YR: ICD-10-PCS | Mod: CPTII,S$GLB,, | Performed by: PODIATRIST

## 2022-10-11 PROCEDURE — 3044F HG A1C LEVEL LT 7.0%: CPT | Mod: CPTII,S$GLB,, | Performed by: PODIATRIST

## 2022-10-11 PROCEDURE — 4010F ACE/ARB THERAPY RXD/TAKEN: CPT | Mod: CPTII,S$GLB,, | Performed by: PODIATRIST

## 2022-10-11 PROCEDURE — 3066F PR DOCUMENTATION OF TREATMENT FOR NEPHROPATHY: ICD-10-PCS | Mod: CPTII,S$GLB,, | Performed by: PODIATRIST

## 2022-10-11 PROCEDURE — 4010F PR ACE/ARB THEARPY RXD/TAKEN: ICD-10-PCS | Mod: CPTII,S$GLB,, | Performed by: PODIATRIST

## 2022-10-11 PROCEDURE — 1159F PR MEDICATION LIST DOCUMENTED IN MEDICAL RECORD: ICD-10-PCS | Mod: CPTII,S$GLB,, | Performed by: PODIATRIST

## 2022-10-11 PROCEDURE — 99499 NO LOS: ICD-10-PCS | Mod: S$GLB,,, | Performed by: PODIATRIST

## 2022-10-11 PROCEDURE — 99999 PR PBB SHADOW E&M-EST. PATIENT-LVL III: ICD-10-PCS | Mod: PBBFAC,,, | Performed by: PODIATRIST

## 2022-10-11 PROCEDURE — 3066F NEPHROPATHY DOC TX: CPT | Mod: CPTII,S$GLB,, | Performed by: PODIATRIST

## 2022-10-11 PROCEDURE — 1125F PR PAIN SEVERITY QUANTIFIED, PAIN PRESENT: ICD-10-PCS | Mod: CPTII,S$GLB,, | Performed by: PODIATRIST

## 2022-10-11 PROCEDURE — 99999 PR PBB SHADOW E&M-EST. PATIENT-LVL III: CPT | Mod: PBBFAC,,, | Performed by: PODIATRIST

## 2022-10-11 PROCEDURE — 11721 DEBRIDE NAIL 6 OR MORE: CPT | Mod: 59,Q8,S$GLB, | Performed by: PODIATRIST

## 2022-10-11 PROCEDURE — 3288F FALL RISK ASSESSMENT DOCD: CPT | Mod: CPTII,S$GLB,, | Performed by: PODIATRIST

## 2022-10-11 PROCEDURE — 3044F PR MOST RECENT HEMOGLOBIN A1C LEVEL <7.0%: ICD-10-PCS | Mod: CPTII,S$GLB,, | Performed by: PODIATRIST

## 2022-10-11 PROCEDURE — 3288F PR FALLS RISK ASSESSMENT DOCUMENTED: ICD-10-PCS | Mod: CPTII,S$GLB,, | Performed by: PODIATRIST

## 2022-10-11 PROCEDURE — 1125F AMNT PAIN NOTED PAIN PRSNT: CPT | Mod: CPTII,S$GLB,, | Performed by: PODIATRIST

## 2022-10-11 PROCEDURE — 11055 PARING/CUTG B9 HYPRKER LES 1: CPT | Mod: Q8,S$GLB,, | Performed by: PODIATRIST

## 2022-10-11 PROCEDURE — 11055 PR TRIM HYPERKERATOTIC SKIN LESION, ONE: ICD-10-PCS | Mod: Q8,S$GLB,, | Performed by: PODIATRIST

## 2022-10-11 PROCEDURE — 1159F MED LIST DOCD IN RCRD: CPT | Mod: CPTII,S$GLB,, | Performed by: PODIATRIST

## 2022-10-11 NOTE — PROGRESS NOTES
[facility] -   PODIATRIC MEDICINE AND SURGERY  PROGRESS NOTE  10/11/2022    PODIATRY NOTE  PCP: Dr. Rose Parks MD, Last Visit 2/2/21    CHIEF COMPLAINT   Chief Complaint   Patient presents with    Foot Problem     C/o discoloration/black of the right great toe, 2nd and 3rd toe, distal aspect, x 3 months, rates pain 5/10, pt would like a referral to dermatology, diabetic, temi charles, last seen diabetes management Natty Mistry on 09/13/22       HPI:    Avril Monzon is a 77 y.o. female who has a past medical history of Acute hypoxemic respiratory failure (11/26/2018), Anemia, Arthritis, Back pain, Cataract, CKD stage G4/A3, GFR 15 - 29 and albumin creatinine ratio >300 mg/g, Coronary artery disease involving native coronary artery of native heart (11/30/2018), Diabetic retinopathy, DM (diabetes mellitus) type II controlled, neurological manifestation, Exudative age-related macular degeneration of left eye with active choroidal neovascularization (2/5/2019), GERD (gastroesophageal reflux disease), Glaucoma, Heart failure, Hyperlipidemia, Hypertension, Non-ST elevation MI (NSTEMI) (11/28/2018), NSTEMI (non-ST elevated myocardial infarction) (11/27/2018), Peripheral neuropathy, Polyneuropathy, Proteinuria, Seizures, Stroke (2012,2014), Tobacco dependence, and Type 2 diabetes with peripheral circulatory disorder, controlled.     Avril presents to clinic today complaining of painful elongated toenails. She is legally blind and unable to trim her nails.  She requests at risk foot care due to thickened, painful, elongated toenails.     She states she was recently discharged from hospital and she noticed darkening discoloration to tip of right great toe with associated pain.  Patient denies other pedal complaints at this time.     Hemoglobin A1C   Date Value Ref Range Status   09/13/2022 5.3 4.0 - 5.6 % Final     Comment:     ADA Screening Guidelines:  5.7-6.4%  Consistent with  prediabetes  >or=6.5%  Consistent with diabetes    High levels of fetal hemoglobin interfere with the HbA1C  assay. Heterozygous hemoglobin variants (HbS, HgC, etc)do  not significantly interfere with this assay.   However, presence of multiple variants may affect accuracy.     07/12/2022 9.3 (H) 4.0 - 5.6 % Final     Comment:     ADA Screening Guidelines:  5.7-6.4%  Consistent with prediabetes  >or=6.5%  Consistent with diabetes    High levels of fetal hemoglobin interfere with the HbA1C  assay. Heterozygous hemoglobin variants (HbS, HgC, etc)do  not significantly interfere with this assay.   However, presence of multiple variants may affect accuracy.     03/08/2022 10.8 (H) 4.0 - 5.6 % Final     Comment:     ADA Screening Guidelines:  5.7-6.4%  Consistent with prediabetes  >or=6.5%  Consistent with diabetes    High levels of fetal hemoglobin interfere with the HbA1C  assay. Heterozygous hemoglobin variants (HbS, HgC, etc)do  not significantly interfere with this assay.   However, presence of multiple variants may affect accuracy.     04/26/2021 7.3 % Final   10/26/2020 9.0 % Final     Comment:     Per Care Everywhere          PM  Past Medical History:   Diagnosis Date    Acute hypoxemic respiratory failure 11/26/2018    Anemia     Arthritis     back, hand, knees    Back pain     Cataract     CKD stage G4/A3, GFR 15 - 29 and albumin creatinine ratio >300 mg/g     GFR 40% Jan 2014 and 33% ub 3/2014 (BRG)    Coronary artery disease involving native coronary artery of native heart 11/30/2018    Diabetic retinopathy     DM (diabetes mellitus) type II controlled, neurological manifestation     Exudative age-related macular degeneration of left eye with active choroidal neovascularization 2/5/2019    GERD (gastroesophageal reflux disease)     Glaucoma     Heart failure     Hyperlipidemia     Hypertension     Non-ST elevation MI (NSTEMI) 11/28/2018    NSTEMI (non-ST elevated myocardial infarction) 11/27/2018     Peripheral neuropathy     Polyneuropathy     Proteinuria     >6 mo     Seizures     BRG 1/2014 -dick CT NRI showed small vessel    Stroke 2012,2014    Tobacco dependence     Type 2 diabetes with peripheral circulatory disorder, controlled        PROBLEM LIST  Patient Active Problem List    Diagnosis Date Noted    COVID 08/03/2022    S/P CABG (coronary artery bypass graft) 07/20/2022    NSTEMI (non-ST elevated myocardial infarction) 07/12/2022    Metabolic acidosis 07/12/2022    Chronic diastolic heart failure 12/20/2021    Elevated troponin 12/06/2021    Other specified complication of vascular prosthetic devices, implants and grafts, initial encounter 10/19/2021    Unspecified inflammatory spondylopathy, lumbar region 03/11/2021    Convulsions 03/11/2021    Hemiplegia 03/11/2021    Pulmonary edema 07/01/2020    DM type 2 with diabetic peripheral neuropathy 06/12/2020    Age-related nuclear cataract of left eye 05/06/2019    Non-rheumatic mitral regurgitation 04/05/2019    Non-rheumatic tricuspid valve insufficiency 04/05/2019    Exudative age-related macular degeneration of left eye with active choroidal neovascularization 02/05/2019    CAD (coronary artery disease) 11/30/2018    Pulmonary hypertension 11/28/2018    Hyperlipidemia associated with type 2 diabetes mellitus 06/08/2018    Iron deficiency anemia due to chronic blood loss 02/22/2018    Anemia in chronic kidney disease, on chronic dialysis 05/18/2017    History of CVA (cerebrovascular accident) 05/16/2017    Hyperparathyroidism 05/16/2017    Vitamin D deficiency 05/16/2017    DDD (degenerative disc disease), lumbar 05/16/2017    Bilateral carotid artery disease 11/11/2016    Aortic atherosclerosis 11/11/2016    Type 2 diabetes mellitus with circulatory disorder, with long-term current use of insulin 11/11/2016    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, with long-term current use of insulin 05/02/2016    Cataracta  renajamarcuscens of right eye 05/02/2016    ESRD (end stage renal disease)     Proteinuria     Constipation 02/18/2014    Hypertension associated with diabetes        MEDS  Current Outpatient Medications on File Prior to Visit   Medication Sig Dispense Refill    aspirin (ECOTRIN) 81 MG EC tablet Take 1 tablet (81 mg total) by mouth once daily. 90 tablet 1    azelastine (ASTELIN) 137 mcg (0.1 %) nasal spray 1 spray (137 mcg total) by Nasal route 2 (two) times daily. 30 mL 0    blood sugar diagnostic (TRUE METRIX GLUCOSE TEST STRIP) Strp 1 strip by Misc.(Non-Drug; Combo Route) route 3 (three) times daily. 100 each 11    blood-glucose meter (TRUE METRIX GLUCOSE METER) Misc Use as instructed 1 each 0    cyanocobalamin (VITAMIN B-12) 1000 MCG tablet Take 100 mcg by mouth once daily.      folic acid (FOLVITE) 1 MG tablet Take 1 mg by mouth once daily.      lancets (TRUEPLUS LANCETS) 33 gauge Misc 1 lancet by Misc.(Non-Drug; Combo Route) route 3 (three) times daily. 100 each 11    metoprolol tartrate (LOPRESSOR) 50 MG tablet Take 1 tablet (50 mg total) by mouth 2 (two) times daily. 180 tablet 1    sevelamer carbonate (RENVELA) 800 mg Tab Take 2 tablets (1,600 mg total) by mouth 3 (three) times daily. 180 tablet 0    telmisartan (MICARDIS) 20 MG Tab Take 1 tablet (20 mg total) by mouth once daily. 90 tablet 1    clopidogreL (PLAVIX) 75 mg tablet Take 1 tablet (75 mg total) by mouth once daily. 90 tablet 3    [DISCONTINUED] isosorbide-hydrALAZINE 20-37.5 mg (BIDIL) 20-37.5 mg Tab Take 1 tablet by mouth 3 (three) times daily. 90 tablet 1     No current facility-administered medications on file prior to visit.       Medication List with Changes/Refills   Current Medications    ASPIRIN (ECOTRIN) 81 MG EC TABLET    Take 1 tablet (81 mg total) by mouth once daily.    AZELASTINE (ASTELIN) 137 MCG (0.1 %) NASAL SPRAY    1 spray (137 mcg total) by Nasal route 2 (two) times daily.    BLOOD SUGAR DIAGNOSTIC (TRUE METRIX GLUCOSE TEST STRIP)  STRP    1 strip by Misc.(Non-Drug; Combo Route) route 3 (three) times daily.    BLOOD-GLUCOSE METER (TRUE METRIX GLUCOSE METER) MISC    Use as instructed    CLOPIDOGREL (PLAVIX) 75 MG TABLET    Take 1 tablet (75 mg total) by mouth once daily.    CYANOCOBALAMIN (VITAMIN B-12) 1000 MCG TABLET    Take 100 mcg by mouth once daily.    FOLIC ACID (FOLVITE) 1 MG TABLET    Take 1 mg by mouth once daily.    LANCETS (TRUEPLUS LANCETS) 33 GAUGE MISC    1 lancet by Misc.(Non-Drug; Combo Route) route 3 (three) times daily.    METOPROLOL TARTRATE (LOPRESSOR) 50 MG TABLET    Take 1 tablet (50 mg total) by mouth 2 (two) times daily.    SEVELAMER CARBONATE (RENVELA) 800 MG TAB    Take 2 tablets (1,600 mg total) by mouth 3 (three) times daily.    TELMISARTAN (MICARDIS) 20 MG TAB    Take 1 tablet (20 mg total) by mouth once daily.       PSH     Past Surgical History:   Procedure Laterality Date    BREAST LUMPECTOMY Left     benign, when patient was 15 years old    BREAST MASS EXCISION      ,benign, age 15.    CATHETERIZATION OF BOTH LEFT AND RIGHT HEART N/A 11/28/2018    Procedure: CATHETERIZATION, HEART, BOTH LEFT AND RIGHT;  Surgeon: Michelle Holman MD;  Location: [facility] CATH LAB;  Service: Cardiology;  Laterality: N/A;    CATHETERIZATION OF BOTH LEFT AND RIGHT HEART N/A 11/30/2018    Procedure: CATHETERIZATION, HEART, BOTH LEFT AND RIGHT;  Surgeon: Michelle Holman MD;  Location: [facility] CATH LAB;  Service: Cardiology;  Laterality: N/A;    CORONARY ARTERY BYPASS GRAFT      CORONARY ARTERY BYPASS GRAFT (CABG) N/A 7/20/2022    Procedure: CORONARY ARTERY BYPASS GRAFT (CABG);  Surgeon: Sanju Locke MD;  Location: [facility] OR;  Service: Cardiothoracic;  Laterality: N/A;  2-VESSEL PUMP ASSIST WITH EPI-AORTIC ULTRASOUND    ENDOSCOPIC HARVEST OF VEIN Left 7/20/2022    Procedure: SURGICAL PROCUREMENT, VEIN, ENDOSCOPIC;  Surgeon: Sanju Locke MD;  Location: [facility] OR;  Service: Cardiothoracic;  Laterality: Left;    HYSTERECTOMY  1982     INJECTION OF ANESTHETIC AGENT AROUND MULTIPLE INTERCOSTAL NERVES N/A 2022    Procedure: BLOCK, NERVE, INTERCOSTAL, 2 OR MORE;  Surgeon: Sanju Locke MD;  Location: [facility] OR;  Service: Cardiothoracic;  Laterality: N/A;  PARASTERNAL NERVE BLOCK    INSERTION OF SHUNT      LEFT HEART CATHETERIZATION Left 12/3/2018    Procedure: CATHETERIZATION, HEART, LEFT;  Surgeon: Michelle Holman MD;  Location: [facility] CATH LAB;  Service: Cardiology;  Laterality: Left;  LAD ATHERECTOMY STENT/ FEMORAL APPROACH    LEFT HEART CATHETERIZATION Left 2022    Procedure: CATHETERIZATION, HEART, LEFT;  Surgeon: Abhi Sloan MD;  Location: [facility] CATH LAB;  Service: Cardiology;  Laterality: Left;    OOPHORECTOMY      wrist cyst Right 1980s    dorsal wrist cyst        ALL  Review of patient's allergies indicates:   Allergen Reactions    Codeine Swelling    Darvon [propoxyphene] Swelling       SOC     Social History     Tobacco Use    Smoking status: Former     Packs/day: 1.50     Years: 30.00     Pack years: 45.00     Types: Cigarettes     Quit date: 1990     Years since quittin.7    Smokeless tobacco: Former   Substance Use Topics    Alcohol use: No     Alcohol/week: 0.0 standard drinks    Drug use: No         FAMILY HX    Family History   Problem Relation Age of Onset    Diabetes Mother     Hyperlipidemia Mother     Hypertension Mother     Heart disease Mother         MI    Muscular dystrophy Son     Cancer Maternal Grandmother         colon            REVIEW OF SYSTEMS   General: This patient is well-developed, well-nourished and appears stated age, well-oriented to person, place and time, and cooperative and pleasant on today's visit  Constitutional: Negative for chills and fever.   Respiratory: Negative for shortness of breath.    Cardiovascular: Negative for chest pain, palpitations, orthopnea  Gastrointestinal: Negative for diarrhea, nausea and vomiting.   Musculoskeletal: Positive for above noted in HPI  Skin:  "positive for skin and nail changes  Neurological: positive for tingling and sensory changes  Peripheral Vascular: no claudication or cyanosis  Psychiatric/Behavioral: Negative for altered mental status     PHYSICAL EXAM:      Vitals:    10/11/22 1404   Weight: 52.2 kg (115 lb)   Height: 5' 4" (1.626 m)   PainSc:   5       General: This patient is well-developed, well-nourished and appears stated age, well-oriented to person, place and time, and cooperative and pleasant on today's visit    LOWER EXTREMITY  VASCULAR  Diminished pedal pulses b/l  Capillary refill time immediate to the toes.   Feet are warm to the touch. Skin temperature warm to warm from proximally to distally   There are varicosities, telangiectasias noted to bilateral foot and ankle regions.   There are no ecchymoses noted to bilateral foot and ankle regions.   There is gross lower extremity edema.    DERMATOLOGIC  Skin moist with healthy texture and turgor.  There are no open ulcerations, lacerations, or fissures to bilateral foot and ankle regions. There are no signs of infection as there is no erythema, no proximal-extending lymphangiitis, no fluctuance, or crepitus noted on palpation to bilateral foot and ankle regions.   There is no interdigital maceration.   Nails 1, 4, 5 RIGHT and LEFT foot thickened, and elongated, the remaining 4 nails were elongated and not thickened   There is pressure ulcer distal tip right hallux with overlying eschar    NEUROLOGIC  Epicritic sensation is diminished.   Achilles and patellar deep tendon reflexes intact  Babinski reflex absent    ORTHOPEDIC/BIOMECHANICAL  Muscle strength AT/EHL/EDL/PT: 5/5; Achilles/Gastroc/Soleus: 5/5; PB/PL: 5/5 Muscle tone is normal.  Ankle joint ROM INTACT DF/PF, non-crepitus  STJ ROM  inv/ev, non crepitus         ASSESSMENT     Encounter Diagnoses   Name Primary?    Eschar of toe     Dermatophytosis of nail Yes    Controlled type 2 diabetes mellitus with diabetic polyneuropathy, with " long-term current use of insulin          PLAN  Patient was educated about clinical and imaging findings, and verbalizes understanding of above.     Wound was cleansed with saline and then lightly debrided with tissue nipper. There was sanguinous drainage expressed. No acute soi. Betadine dressing and complete offloading with close monitoring.     -With patient's permission, the elongated onychomycotic toenails, as outlined in the physical examination x6, were sharply debrided with a double action nail nipper to their soft tissue attachment. If indicated, the nails were then smoothed down in thickness with a mechanical rotary chaz and/or hui board to facilitate in further debridement removing all offending nail and subungual debris    -With patient's permission, nails elongated x 4 were trimmed with nail nipper. Patient relates relief following the procedure. Patient will continue to monitor the areas daily, inspect feet, wear protective shoe gear when ambulatory, moisturizer to maintain skin integrity.     Patient's DMI/DMII is managed by Primary Care Provider and/or Endocrinology Advanced Practice Provider         Future Appointments   Date Time Provider Department Center   10/14/2022  2:40 PM Michelle Holman MD [facility] CARDIO [place]   10/24/2022  3:30 PM Anette Montanez DPM [facility] POD [facility]   10/25/2022  8:00 AM Natty Mistry NP [facility] DIABETE [facility]   11/8/2022  1:30 PM Conchita Garcia RD, CDE [facility] DIBEDU [facility]   12/7/2022 11:30 AM JESSENIA Scott    12/20/2022  2:20 PM Hattie Stephens MD [facility] PULMSVC [facility]   12/22/2022  2:20 PM Demetrio Mejia MD [facility] CARDIO [facility]   3/3/2023 10:00 AM JADIEL Coello MD [facility] OPHTHAL [facility]       Report Electronically Signed By:    Anette Montanez DPM   Podiatric Medicine & Surgery  [facility]  10/11/2022     Disclaimer:  This note may have been prepared using voice recognition  software, it may have not been extensively proofed, as such there could be errors within the text such as sound alike errors.

## 2022-10-13 ENCOUNTER — EXTERNAL HOME HEALTH (OUTPATIENT)
Dept: HOME HEALTH SERVICES | Facility: HOSPITAL | Age: 77
End: 2022-10-13
Payer: MEDICARE

## 2022-10-14 ENCOUNTER — OFFICE VISIT (OUTPATIENT)
Dept: CARDIOLOGY | Facility: CLINIC | Age: 77
End: 2022-10-14
Payer: MEDICARE

## 2022-10-14 VITALS
HEIGHT: 64 IN | OXYGEN SATURATION: 100 % | BODY MASS INDEX: 19.39 KG/M2 | WEIGHT: 113.56 LBS | DIASTOLIC BLOOD PRESSURE: 60 MMHG | SYSTOLIC BLOOD PRESSURE: 142 MMHG | HEART RATE: 65 BPM

## 2022-10-14 DIAGNOSIS — E78.5 HYPERLIPIDEMIA ASSOCIATED WITH TYPE 2 DIABETES MELLITUS: ICD-10-CM

## 2022-10-14 DIAGNOSIS — E11.69 HYPERLIPIDEMIA ASSOCIATED WITH TYPE 2 DIABETES MELLITUS: ICD-10-CM

## 2022-10-14 DIAGNOSIS — E21.3 HYPERPARATHYROIDISM: ICD-10-CM

## 2022-10-14 DIAGNOSIS — I27.20 PULMONARY HYPERTENSION: ICD-10-CM

## 2022-10-14 DIAGNOSIS — Z86.73 HISTORY OF CVA (CEREBROVASCULAR ACCIDENT): ICD-10-CM

## 2022-10-14 DIAGNOSIS — I50.32 CHRONIC DIASTOLIC HEART FAILURE: ICD-10-CM

## 2022-10-14 DIAGNOSIS — L97.512 SKIN ULCER OF TOE OF RIGHT FOOT WITH FAT LAYER EXPOSED: ICD-10-CM

## 2022-10-14 DIAGNOSIS — I25.118 CORONARY ARTERY DISEASE OF NATIVE ARTERY OF NATIVE HEART WITH STABLE ANGINA PECTORIS: ICD-10-CM

## 2022-10-14 DIAGNOSIS — I21.4 NSTEMI (NON-ST ELEVATED MYOCARDIAL INFARCTION): ICD-10-CM

## 2022-10-14 DIAGNOSIS — E11.51 TYPE 2 DIABETES MELLITUS WITH DIABETIC PERIPHERAL ANGIOPATHY WITHOUT GANGRENE, WITH LONG-TERM CURRENT USE OF INSULIN: ICD-10-CM

## 2022-10-14 DIAGNOSIS — Z99.2 ANEMIA IN CHRONIC KIDNEY DISEASE, ON CHRONIC DIALYSIS: ICD-10-CM

## 2022-10-14 DIAGNOSIS — D50.0 IRON DEFICIENCY ANEMIA DUE TO CHRONIC BLOOD LOSS: ICD-10-CM

## 2022-10-14 DIAGNOSIS — I70.0 AORTIC ATHEROSCLEROSIS: ICD-10-CM

## 2022-10-14 DIAGNOSIS — E11.59 HYPERTENSION ASSOCIATED WITH DIABETES: ICD-10-CM

## 2022-10-14 DIAGNOSIS — R56.9 CONVULSIONS, UNSPECIFIED CONVULSION TYPE: Primary | ICD-10-CM

## 2022-10-14 DIAGNOSIS — I34.0 NON-RHEUMATIC MITRAL REGURGITATION: ICD-10-CM

## 2022-10-14 DIAGNOSIS — E11.3553 TYPE 2 DIABETES MELLITUS WITH STABLE PROLIFERATIVE RETINOPATHY OF BOTH EYES, WITH LONG-TERM CURRENT USE OF INSULIN: ICD-10-CM

## 2022-10-14 DIAGNOSIS — N18.6 ANEMIA IN CHRONIC KIDNEY DISEASE, ON CHRONIC DIALYSIS: ICD-10-CM

## 2022-10-14 DIAGNOSIS — E11.42 DM TYPE 2 WITH DIABETIC PERIPHERAL NEUROPATHY: ICD-10-CM

## 2022-10-14 DIAGNOSIS — N18.6 ESRD (END STAGE RENAL DISEASE): ICD-10-CM

## 2022-10-14 DIAGNOSIS — Z95.1 S/P CABG (CORONARY ARTERY BYPASS GRAFT): ICD-10-CM

## 2022-10-14 DIAGNOSIS — E55.9 VITAMIN D DEFICIENCY: ICD-10-CM

## 2022-10-14 DIAGNOSIS — I15.2 HYPERTENSION ASSOCIATED WITH DIABETES: ICD-10-CM

## 2022-10-14 DIAGNOSIS — D63.1 ANEMIA IN CHRONIC KIDNEY DISEASE, ON CHRONIC DIALYSIS: ICD-10-CM

## 2022-10-14 DIAGNOSIS — I65.23 BILATERAL CAROTID ARTERY STENOSIS: ICD-10-CM

## 2022-10-14 DIAGNOSIS — Z79.4 TYPE 2 DIABETES MELLITUS WITH DIABETIC PERIPHERAL ANGIOPATHY WITHOUT GANGRENE, WITH LONG-TERM CURRENT USE OF INSULIN: ICD-10-CM

## 2022-10-14 DIAGNOSIS — Z79.4 TYPE 2 DIABETES MELLITUS WITH STABLE PROLIFERATIVE RETINOPATHY OF BOTH EYES, WITH LONG-TERM CURRENT USE OF INSULIN: ICD-10-CM

## 2022-10-14 DIAGNOSIS — R79.89 ELEVATED TROPONIN: ICD-10-CM

## 2022-10-14 PROCEDURE — 3044F HG A1C LEVEL LT 7.0%: CPT | Mod: CPTII,S$GLB,, | Performed by: INTERNAL MEDICINE

## 2022-10-14 PROCEDURE — 1159F PR MEDICATION LIST DOCUMENTED IN MEDICAL RECORD: ICD-10-PCS | Mod: CPTII,S$GLB,, | Performed by: INTERNAL MEDICINE

## 2022-10-14 PROCEDURE — 3077F PR MOST RECENT SYSTOLIC BLOOD PRESSURE >= 140 MM HG: ICD-10-PCS | Mod: CPTII,S$GLB,, | Performed by: INTERNAL MEDICINE

## 2022-10-14 PROCEDURE — 99999 PR PBB SHADOW E&M-EST. PATIENT-LVL III: CPT | Mod: PBBFAC,,, | Performed by: INTERNAL MEDICINE

## 2022-10-14 PROCEDURE — 3066F PR DOCUMENTATION OF TREATMENT FOR NEPHROPATHY: ICD-10-PCS | Mod: CPTII,S$GLB,, | Performed by: INTERNAL MEDICINE

## 2022-10-14 PROCEDURE — 99499 RISK ADDL DX/OHS AUDIT: ICD-10-PCS | Mod: HCNC,S$GLB,, | Performed by: INTERNAL MEDICINE

## 2022-10-14 PROCEDURE — 99214 OFFICE O/P EST MOD 30 MIN: CPT | Mod: S$GLB,,, | Performed by: INTERNAL MEDICINE

## 2022-10-14 PROCEDURE — 1126F AMNT PAIN NOTED NONE PRSNT: CPT | Mod: CPTII,S$GLB,, | Performed by: INTERNAL MEDICINE

## 2022-10-14 PROCEDURE — 3078F PR MOST RECENT DIASTOLIC BLOOD PRESSURE < 80 MM HG: ICD-10-PCS | Mod: CPTII,S$GLB,, | Performed by: INTERNAL MEDICINE

## 2022-10-14 PROCEDURE — 3066F NEPHROPATHY DOC TX: CPT | Mod: CPTII,S$GLB,, | Performed by: INTERNAL MEDICINE

## 2022-10-14 PROCEDURE — 3077F SYST BP >= 140 MM HG: CPT | Mod: CPTII,S$GLB,, | Performed by: INTERNAL MEDICINE

## 2022-10-14 PROCEDURE — 3044F PR MOST RECENT HEMOGLOBIN A1C LEVEL <7.0%: ICD-10-PCS | Mod: CPTII,S$GLB,, | Performed by: INTERNAL MEDICINE

## 2022-10-14 PROCEDURE — 1159F MED LIST DOCD IN RCRD: CPT | Mod: CPTII,S$GLB,, | Performed by: INTERNAL MEDICINE

## 2022-10-14 PROCEDURE — 99999 PR PBB SHADOW E&M-EST. PATIENT-LVL III: ICD-10-PCS | Mod: PBBFAC,,, | Performed by: INTERNAL MEDICINE

## 2022-10-14 PROCEDURE — 99499 UNLISTED E&M SERVICE: CPT | Mod: HCNC,S$GLB,, | Performed by: INTERNAL MEDICINE

## 2022-10-14 PROCEDURE — 99214 PR OFFICE/OUTPT VISIT, EST, LEVL IV, 30-39 MIN: ICD-10-PCS | Mod: S$GLB,,, | Performed by: INTERNAL MEDICINE

## 2022-10-14 PROCEDURE — 4010F PR ACE/ARB THEARPY RXD/TAKEN: ICD-10-PCS | Mod: CPTII,S$GLB,, | Performed by: INTERNAL MEDICINE

## 2022-10-14 PROCEDURE — 1160F PR REVIEW ALL MEDS BY PRESCRIBER/CLIN PHARMACIST DOCUMENTED: ICD-10-PCS | Mod: CPTII,S$GLB,, | Performed by: INTERNAL MEDICINE

## 2022-10-14 PROCEDURE — 1126F PR PAIN SEVERITY QUANTIFIED, NO PAIN PRESENT: ICD-10-PCS | Mod: CPTII,S$GLB,, | Performed by: INTERNAL MEDICINE

## 2022-10-14 PROCEDURE — 4010F ACE/ARB THERAPY RXD/TAKEN: CPT | Mod: CPTII,S$GLB,, | Performed by: INTERNAL MEDICINE

## 2022-10-14 PROCEDURE — 3078F DIAST BP <80 MM HG: CPT | Mod: CPTII,S$GLB,, | Performed by: INTERNAL MEDICINE

## 2022-10-14 PROCEDURE — 1160F RVW MEDS BY RX/DR IN RCRD: CPT | Mod: CPTII,S$GLB,, | Performed by: INTERNAL MEDICINE

## 2022-10-14 PROCEDURE — 3288F FALL RISK ASSESSMENT DOCD: CPT | Mod: CPTII,S$GLB,, | Performed by: INTERNAL MEDICINE

## 2022-10-14 PROCEDURE — 1101F PT FALLS ASSESS-DOCD LE1/YR: CPT | Mod: CPTII,S$GLB,, | Performed by: INTERNAL MEDICINE

## 2022-10-14 PROCEDURE — 1101F PR PT FALLS ASSESS DOC 0-1 FALLS W/OUT INJ PAST YR: ICD-10-PCS | Mod: CPTII,S$GLB,, | Performed by: INTERNAL MEDICINE

## 2022-10-14 PROCEDURE — 3288F PR FALLS RISK ASSESSMENT DOCUMENTED: ICD-10-PCS | Mod: CPTII,S$GLB,, | Performed by: INTERNAL MEDICINE

## 2022-10-14 RX ORDER — ROSUVASTATIN CALCIUM 10 MG/1
10 TABLET, COATED ORAL DAILY
Qty: 90 TABLET | Refills: 3 | Status: SHIPPED | OUTPATIENT
Start: 2022-10-14 | End: 2023-01-19 | Stop reason: SDUPTHER

## 2022-10-14 NOTE — PROGRESS NOTES
Subjective:   Patient ID:  Avril Monzon is a 77 y.o. female who presents for follow up of No chief complaint on file.      HPI  A 78 yo female well known to me from previous encounter with cad s/p intervention nstemi esrd on hd underwent cabg subsequently had covid and  pulmonary edema. Has recovered well she is diabetic went to see podiatry had rt toe ulcer as well as heel ulcers bilaterally being managed with betadine application no discharge no pain   Past Medical History:   Diagnosis Date    Acute hypoxemic respiratory failure 11/26/2018    Anemia     Arthritis     back, hand, knees    Back pain     Cataract     CKD stage G4/A3, GFR 15 - 29 and albumin creatinine ratio >300 mg/g     GFR 40% Jan 2014 and 33% ub 3/2014 ([facility])    Coronary artery disease involving native coronary artery of native heart 11/30/2018    Diabetic retinopathy     DM (diabetes mellitus) type II controlled, neurological manifestation     Exudative age-related macular degeneration of left eye with active choroidal neovascularization 2/5/2019    GERD (gastroesophageal reflux disease)     Glaucoma     Heart failure     Hyperlipidemia     Hypertension     Non-ST elevation MI (NSTEMI) 11/28/2018    NSTEMI (non-ST elevated myocardial infarction) 11/27/2018    Peripheral neuropathy     Polyneuropathy     Proteinuria     >6 mo     Seizures     BRG 1/2014 -dick CT NRI showed small vessel    Skin ulcer of toe of right foot with fat layer exposed 10/14/2022    Stroke 2012,2014    Tobacco dependence     Type 2 diabetes with peripheral circulatory disorder, controlled        Past Surgical History:   Procedure Laterality Date    BREAST LUMPECTOMY Left     benign, when patient was 15 years old    BREAST MASS EXCISION      ,benign, age 15.    CATHETERIZATION OF BOTH LEFT AND RIGHT HEART N/A 11/28/2018    Procedure: CATHETERIZATION, HEART, BOTH LEFT AND RIGHT;  Surgeon: Michelle Holman MD;  Location: [facility] CATH LAB;  Service: Cardiology;  Laterality: N/A;     CATHETERIZATION OF BOTH LEFT AND RIGHT HEART N/A 2018    Procedure: CATHETERIZATION, HEART, BOTH LEFT AND RIGHT;  Surgeon: Michelle Holman MD;  Location: [facility] CATH LAB;  Service: Cardiology;  Laterality: N/A;    CORONARY ARTERY BYPASS GRAFT      CORONARY ARTERY BYPASS GRAFT (CABG) N/A 2022    Procedure: CORONARY ARTERY BYPASS GRAFT (CABG);  Surgeon: Sanju Locke MD;  Location: [facility] OR;  Service: Cardiothoracic;  Laterality: N/A;  2-VESSEL PUMP ASSIST WITH EPI-AORTIC ULTRASOUND    ENDOSCOPIC HARVEST OF VEIN Left 2022    Procedure: SURGICAL PROCUREMENT, VEIN, ENDOSCOPIC;  Surgeon: Sanju Locke MD;  Location: [facility] OR;  Service: Cardiothoracic;  Laterality: Left;    HYSTERECTOMY  1982    INJECTION OF ANESTHETIC AGENT AROUND MULTIPLE INTERCOSTAL NERVES N/A 2022    Procedure: BLOCK, NERVE, INTERCOSTAL, 2 OR MORE;  Surgeon: Sanju Locke MD;  Location: [facility] OR;  Service: Cardiothoracic;  Laterality: N/A;  PARASTERNAL NERVE BLOCK    INSERTION OF SHUNT      LEFT HEART CATHETERIZATION Left 12/3/2018    Procedure: CATHETERIZATION, HEART, LEFT;  Surgeon: Michelle Holman MD;  Location: [facility] CATH LAB;  Service: Cardiology;  Laterality: Left;  LAD ATHERECTOMY STENT/ FEMORAL APPROACH    LEFT HEART CATHETERIZATION Left 2022    Procedure: CATHETERIZATION, HEART, LEFT;  Surgeon: Abhi Sloan MD;  Location: [facility] CATH LAB;  Service: Cardiology;  Laterality: Left;    OOPHORECTOMY      wrist cyst Right 1980s    dorsal wrist cyst       Social History     Tobacco Use    Smoking status: Former     Packs/day: 1.50     Years: 30.00     Pack years: 45.00     Types: Cigarettes     Quit date: 1990     Years since quittin.7    Smokeless tobacco: Former   Substance Use Topics    Alcohol use: No     Alcohol/week: 0.0 standard drinks    Drug use: No       Family History   Problem Relation Age of Onset    Diabetes Mother     Hyperlipidemia Mother     Hypertension Mother     Heart disease Mother          MI    Muscular dystrophy Son     Cancer Maternal Grandmother         colon       Current Outpatient Medications   Medication Sig    aspirin (ECOTRIN) 81 MG EC tablet Take 1 tablet (81 mg total) by mouth once daily.    azelastine (ASTELIN) 137 mcg (0.1 %) nasal spray 1 spray (137 mcg total) by Nasal route 2 (two) times daily.    blood sugar diagnostic (TRUE METRIX GLUCOSE TEST STRIP) Strp 1 strip by Misc.(Non-Drug; Combo Route) route 3 (three) times daily.    blood-glucose meter (TRUE METRIX GLUCOSE METER) Misc Use as instructed    clopidogreL (PLAVIX) 75 mg tablet Take 1 tablet (75 mg total) by mouth once daily.    cyanocobalamin (VITAMIN B-12) 1000 MCG tablet Take 100 mcg by mouth once daily.    folic acid (FOLVITE) 1 MG tablet Take 1 mg by mouth once daily.    lancets (TRUEPLUS LANCETS) 33 gauge Misc 1 lancet by Misc.(Non-Drug; Combo Route) route 3 (three) times daily.    metoprolol tartrate (LOPRESSOR) 50 MG tablet Take 1 tablet (50 mg total) by mouth 2 (two) times daily.    sevelamer carbonate (RENVELA) 800 mg Tab Take 2 tablets (1,600 mg total) by mouth 3 (three) times daily.    telmisartan (MICARDIS) 20 MG Tab Take 1 tablet (20 mg total) by mouth once daily.     No current facility-administered medications for this visit.     Current Outpatient Medications on File Prior to Visit   Medication Sig    aspirin (ECOTRIN) 81 MG EC tablet Take 1 tablet (81 mg total) by mouth once daily.    azelastine (ASTELIN) 137 mcg (0.1 %) nasal spray 1 spray (137 mcg total) by Nasal route 2 (two) times daily.    blood sugar diagnostic (TRUE METRIX GLUCOSE TEST STRIP) Strp 1 strip by Misc.(Non-Drug; Combo Route) route 3 (three) times daily.    blood-glucose meter (TRUE METRIX GLUCOSE METER) Misc Use as instructed    clopidogreL (PLAVIX) 75 mg tablet Take 1 tablet (75 mg total) by mouth once daily.    cyanocobalamin (VITAMIN B-12) 1000 MCG tablet Take 100 mcg by mouth once daily.    folic acid (FOLVITE) 1 MG tablet Take 1 mg  by mouth once daily.    lancets (TRUEPLUS LANCETS) 33 gauge Misc 1 lancet by Misc.(Non-Drug; Combo Route) route 3 (three) times daily.    metoprolol tartrate (LOPRESSOR) 50 MG tablet Take 1 tablet (50 mg total) by mouth 2 (two) times daily.    sevelamer carbonate (RENVELA) 800 mg Tab Take 2 tablets (1,600 mg total) by mouth 3 (three) times daily.    telmisartan (MICARDIS) 20 MG Tab Take 1 tablet (20 mg total) by mouth once daily.    [DISCONTINUED] isosorbide-hydrALAZINE 20-37.5 mg (BIDIL) 20-37.5 mg Tab Take 1 tablet by mouth 3 (three) times daily.     No current facility-administered medications on file prior to visit.     Review of patient's allergies indicates:   Allergen Reactions    Codeine Swelling    Darvon [propoxyphene] Swelling    Atorvastatin      Muscle twitching      Review of Systems   Constitutional: Negative for diaphoresis, malaise/fatigue and weight gain.   HENT:  Negative for hoarse voice.    Eyes:  Negative for double vision and visual disturbance.   Cardiovascular:  Negative for chest pain, claudication, cyanosis, dyspnea on exertion, irregular heartbeat, leg swelling, near-syncope, orthopnea, palpitations, paroxysmal nocturnal dyspnea and syncope.   Respiratory:  Negative for cough, hemoptysis, shortness of breath and snoring.    Hematologic/Lymphatic: Negative for bleeding problem. Does not bruise/bleed easily.   Skin:  Positive for color change and poor wound healing.   Musculoskeletal:  Negative for muscle cramps, muscle weakness and myalgias.   Gastrointestinal:  Negative for bloating, abdominal pain, change in bowel habit, diarrhea, heartburn, hematemesis, hematochezia, melena and nausea.   Neurological:  Negative for excessive daytime sleepiness, dizziness, headaches, light-headedness, loss of balance, numbness and weakness.   Psychiatric/Behavioral:  Negative for memory loss. The patient does not have insomnia.    Allergic/Immunologic: Negative for hives.     Objective:   Physical  Exam  Vitals and nursing note reviewed.   Constitutional:       General: She is not in acute distress.     Appearance: Normal appearance. She is well-developed. She is not ill-appearing.   HENT:      Head: Normocephalic and atraumatic.   Eyes:      General: No scleral icterus.     Pupils: Pupils are equal, round, and reactive to light.   Neck:      Thyroid: No thyromegaly.      Vascular: Normal carotid pulses. No carotid bruit, hepatojugular reflux or JVD.      Trachea: No tracheal deviation.   Cardiovascular:      Rate and Rhythm: Normal rate and regular rhythm.      Pulses:           Carotid pulses are 2+ on the right side and 2+ on the left side.       Radial pulses are 2+ on the right side and 1+ on the left side.        Femoral pulses are 2+ on the right side and 2+ on the left side.       Popliteal pulses are 2+ on the right side and 2+ on the left side.        Dorsalis pedis pulses are 1+ on the right side and 1+ on the left side.        Posterior tibial pulses are 1+ on the right side and 1+ on the left side.      Heart sounds: Normal heart sounds. No murmur heard.    No friction rub. No gallop.      Comments: Patent left arm fistula.  Pulmonary:      Effort: Pulmonary effort is normal. No respiratory distress.      Breath sounds: Normal breath sounds. No wheezing, rhonchi or rales.      Comments: Scar cabg   Chest:      Chest wall: No tenderness.   Abdominal:      General: Bowel sounds are normal. There is no abdominal bruit.      Palpations: Abdomen is soft. There is no hepatomegaly or pulsatile mass.      Tenderness: There is no abdominal tenderness.   Musculoskeletal:      Right shoulder: No deformity.      Cervical back: Normal range of motion and neck supple.      Right lower leg: No edema.      Left lower leg: No edema.   Skin:     General: Skin is warm and dry.      Findings: No erythema or rash.      Nails: There is no clubbing.      Comments: Scar venous harvest site well healed.   Toe ulcers noted  "non tender no discharge dry.    Neurological:      Mental Status: She is alert and oriented to person, place, and time.      Cranial Nerves: No cranial nerve deficit.      Coordination: Coordination normal.   Psychiatric:         Speech: Speech normal.         Behavior: Behavior normal.         Thought Content: Thought content normal.     Vitals:    10/14/22 1511   BP: (!) 142/60   Pulse: 65   SpO2: 100%   Weight: 51.5 kg (113 lb 8.6 oz)   Height: 5' 4" (1.626 m)     Lab Results   Component Value Date    CHOL 171 07/13/2022    CHOL 246 (H) 10/19/2021    CHOL 239 (H) 01/26/2021     Lab Results   Component Value Date    HDL 59 07/13/2022    HDL 62 10/19/2021    HDL 82 (H) 01/26/2021     Lab Results   Component Value Date    LDLCALC 102.8 07/13/2022    LDLCALC 159.6 (H) 10/19/2021    LDLCALC 136.2 01/26/2021     Lab Results   Component Value Date    TRIG 46 07/13/2022    TRIG 122 10/19/2021    TRIG 104 01/26/2021     Lab Results   Component Value Date    CHOLHDL 34.5 07/13/2022    CHOLHDL 25.2 10/19/2021    CHOLHDL 34.3 01/26/2021       Chemistry        Component Value Date/Time     (L) 08/26/2022 1222    K 4.2 08/26/2022 1222    CL 99 08/26/2022 1222    CO2 23 08/26/2022 1222    BUN 36 (H) 08/26/2022 1222    CREATININE 5.0 (H) 08/26/2022 1222     (H) 08/26/2022 1222        Component Value Date/Time    CALCIUM 9.2 08/26/2022 1222    ALKPHOS 89 08/26/2022 1222    AST 18 08/26/2022 1222    ALT 12 08/26/2022 1222    BILITOT 0.7 08/26/2022 1222    ESTGFRAFRICA 8 (A) 07/27/2022 0433    ESTGFRAFRICA 8 (A) 07/27/2022 0433    EGFRNONAA 7 (A) 07/27/2022 0433    EGFRNONAA 7 (A) 07/27/2022 0433        Lab Results   Component Value Date    HGBA1C 5.3 09/13/2022       Lab Results   Component Value Date    TSH 1.792 01/26/2021     Lab Results   Component Value Date    INR 1.0 08/07/2022    INR 1.3 (H) 07/21/2022    INR 1.4 (H) 07/20/2022     Lab Results   Component Value Date    WBC 9.37 08/26/2022    HGB 7.5 (L) " 08/26/2022    HCT 23.8 (L) 08/26/2022    MCV 98 08/26/2022     08/26/2022     BMP  Sodium   Date Value Ref Range Status   08/26/2022 134 (L) 136 - 145 mmol/L Final     Potassium   Date Value Ref Range Status   08/26/2022 4.2 3.5 - 5.1 mmol/L Final     Chloride   Date Value Ref Range Status   08/26/2022 99 95 - 110 mmol/L Final     CO2   Date Value Ref Range Status   08/26/2022 23 23 - 29 mmol/L Final     BUN   Date Value Ref Range Status   08/26/2022 36 (H) 8 - 23 mg/dL Final     Creatinine   Date Value Ref Range Status   08/26/2022 5.0 (H) 0.5 - 1.4 mg/dL Final     Calcium   Date Value Ref Range Status   08/26/2022 9.2 8.7 - 10.5 mg/dL Final     Anion Gap   Date Value Ref Range Status   08/26/2022 12 8 - 16 mmol/L Final     eGFR if    Date Value Ref Range Status   07/27/2022 8 (A) >60 mL/min/1.73 m^2 Final   07/27/2022 8 (A) >60 mL/min/1.73 m^2 Final     eGFR if non    Date Value Ref Range Status   07/27/2022 7 (A) >60 mL/min/1.73 m^2 Final     Comment:     Calculation used to obtain the estimated glomerular filtration  rate (eGFR) is the CKD-EPI equation.      07/27/2022 7 (A) >60 mL/min/1.73 m^2 Final     Comment:     Calculation used to obtain the estimated glomerular filtration  rate (eGFR) is the CKD-EPI equation.        CrCl cannot be calculated (Patient's most recent lab result is older than the maximum 7 days allowed.).  US Measurements - Right Lower Arterial Duplex    Right BLAISE   Right BLAISE 0.9           [facility] Sys Lio   Right CFA  cm/s         Right profunda sys  cm/s         Right super femoral ostial sys PSV 74 cm/s         Right super femoral prox sys PSV 83 cm/s         Right super femoral mid sys  cm/s         Right super femoral dist sys PSV 67 cm/s         Right popliteal PSV 62 cm/s         Right tib/per trunk sys PSV 62 cm/s         Right peroneal sys PSV 69 cm/s         Right ant tibial sys PSV 76 cm/s         Right post tibial sys PSV 0  cm/s         Conclusion    R LE: Scattered plaque noted without significant stenosis. Mixture of triphasic/biphasic waveforms  Rt pta ? Occlusion.   Assessment:     1. Convulsions, unspecified convulsion type    2. History of CVA (cerebrovascular accident)    3. Pulmonary hypertension    4. Aortic atherosclerosis    5. Bilateral carotid artery stenosis    6. Coronary artery disease of native artery of native heart with stable angina pectoris    7. Chronic diastolic heart failure    8. Elevated troponin    9. Hyperlipidemia associated with type 2 diabetes mellitus    10. Hypertension associated with diabetes    11. Non-rheumatic mitral regurgitation    12. NSTEMI (non-ST elevated myocardial infarction)    13. S/P CABG (coronary artery bypass graft)    14. ESRD (end stage renal disease)    15. Anemia in chronic kidney disease, on chronic dialysis    16. Iron deficiency anemia due to chronic blood loss    17. DM type 2 with diabetic peripheral neuropathy    18. Hyperparathyroidism    19. Type 2 diabetes mellitus with diabetic peripheral angiopathy without gangrene, with long-term current use of insulin    20. 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, with long-term current use of insulin    21. Vitamin D deficiency    22. Skin ulcer of toe of right foot with fat layer exposed      Has evidence of toe ulcer with ? Distal disease and medial calcinosis no claudication will agree with current conservative approach and will observe clinically. She is on antiplatelet therapy her a1c on target htn controlled however she is not on statins her ld is not on target she has listed as lipitor causes twitching.I think she deserves a trial of crestor low dose. And will obtain nhi  Foot care precautions given no nail salon please. Only podiatry care.  Plan:   Continue current therapy  Cardiac low salt diet.  Risk factor modification and excercise program.  F/u in 3 months with nhi

## 2022-10-24 ENCOUNTER — OFFICE VISIT (OUTPATIENT)
Dept: PODIATRY | Facility: CLINIC | Age: 77
End: 2022-10-24
Payer: MEDICARE

## 2022-10-24 VITALS — HEIGHT: 64 IN | BODY MASS INDEX: 19.29 KG/M2 | WEIGHT: 113 LBS

## 2022-10-24 DIAGNOSIS — E11.3553 TYPE 2 DIABETES MELLITUS WITH STABLE PROLIFERATIVE RETINOPATHY OF BOTH EYES, WITH LONG-TERM CURRENT USE OF INSULIN: ICD-10-CM

## 2022-10-24 DIAGNOSIS — R23.4 ESCHAR OF TOE: Primary | ICD-10-CM

## 2022-10-24 DIAGNOSIS — Z79.4 TYPE 2 DIABETES MELLITUS WITH STABLE PROLIFERATIVE RETINOPATHY OF BOTH EYES, WITH LONG-TERM CURRENT USE OF INSULIN: ICD-10-CM

## 2022-10-24 PROCEDURE — 4010F PR ACE/ARB THEARPY RXD/TAKEN: ICD-10-PCS | Mod: CPTII,S$GLB,, | Performed by: PODIATRIST

## 2022-10-24 PROCEDURE — 99499 UNLISTED E&M SERVICE: CPT | Mod: HCNC,S$GLB,, | Performed by: PODIATRIST

## 2022-10-24 PROCEDURE — 1159F MED LIST DOCD IN RCRD: CPT | Mod: CPTII,S$GLB,, | Performed by: PODIATRIST

## 2022-10-24 PROCEDURE — 1159F PR MEDICATION LIST DOCUMENTED IN MEDICAL RECORD: ICD-10-PCS | Mod: CPTII,S$GLB,, | Performed by: PODIATRIST

## 2022-10-24 PROCEDURE — 99999 PR PBB SHADOW E&M-EST. PATIENT-LVL III: ICD-10-PCS | Mod: PBBFAC,,, | Performed by: PODIATRIST

## 2022-10-24 PROCEDURE — 1125F PR PAIN SEVERITY QUANTIFIED, PAIN PRESENT: ICD-10-PCS | Mod: CPTII,S$GLB,, | Performed by: PODIATRIST

## 2022-10-24 PROCEDURE — 1101F PT FALLS ASSESS-DOCD LE1/YR: CPT | Mod: CPTII,S$GLB,, | Performed by: PODIATRIST

## 2022-10-24 PROCEDURE — 3044F HG A1C LEVEL LT 7.0%: CPT | Mod: CPTII,S$GLB,, | Performed by: PODIATRIST

## 2022-10-24 PROCEDURE — 3288F FALL RISK ASSESSMENT DOCD: CPT | Mod: CPTII,S$GLB,, | Performed by: PODIATRIST

## 2022-10-24 PROCEDURE — 1101F PR PT FALLS ASSESS DOC 0-1 FALLS W/OUT INJ PAST YR: ICD-10-PCS | Mod: CPTII,S$GLB,, | Performed by: PODIATRIST

## 2022-10-24 PROCEDURE — 99499 RISK ADDL DX/OHS AUDIT: ICD-10-PCS | Mod: HCNC,S$GLB,, | Performed by: PODIATRIST

## 2022-10-24 PROCEDURE — 3044F PR MOST RECENT HEMOGLOBIN A1C LEVEL <7.0%: ICD-10-PCS | Mod: CPTII,S$GLB,, | Performed by: PODIATRIST

## 2022-10-24 PROCEDURE — 1125F AMNT PAIN NOTED PAIN PRSNT: CPT | Mod: CPTII,S$GLB,, | Performed by: PODIATRIST

## 2022-10-24 PROCEDURE — 3288F PR FALLS RISK ASSESSMENT DOCUMENTED: ICD-10-PCS | Mod: CPTII,S$GLB,, | Performed by: PODIATRIST

## 2022-10-24 PROCEDURE — 3066F NEPHROPATHY DOC TX: CPT | Mod: CPTII,S$GLB,, | Performed by: PODIATRIST

## 2022-10-24 PROCEDURE — 3066F PR DOCUMENTATION OF TREATMENT FOR NEPHROPATHY: ICD-10-PCS | Mod: CPTII,S$GLB,, | Performed by: PODIATRIST

## 2022-10-24 PROCEDURE — 99213 PR OFFICE/OUTPT VISIT, EST, LEVL III, 20-29 MIN: ICD-10-PCS | Mod: S$GLB,,, | Performed by: PODIATRIST

## 2022-10-24 PROCEDURE — 99999 PR PBB SHADOW E&M-EST. PATIENT-LVL III: CPT | Mod: PBBFAC,,, | Performed by: PODIATRIST

## 2022-10-24 PROCEDURE — 99213 OFFICE O/P EST LOW 20 MIN: CPT | Mod: S$GLB,,, | Performed by: PODIATRIST

## 2022-10-24 PROCEDURE — 4010F ACE/ARB THERAPY RXD/TAKEN: CPT | Mod: CPTII,S$GLB,, | Performed by: PODIATRIST

## 2022-10-24 NOTE — PROGRESS NOTES
[facility] -   PODIATRIC MEDICINE AND SURGERY  PROGRESS NOTE  10/24/2022    PODIATRY NOTE  PCP: Dr. Rose Parks MD, Last Visit 2/2/21    CHIEF COMPLAINT   Chief Complaint   Patient presents with    Foot Ulcer     2 week f/u pressure ulcer, right big toe, rates pain 4/10, diabetic wears casual shoes and socks, Last seen Jian Hill NP 09/01/2022        HPI:    Avril Monzon is a 77 y.o. female who has a past medical history of Acute hypoxemic respiratory failure (11/26/2018), Anemia, Arthritis, Back pain, Cataract, CKD stage G4/A3, GFR 15 - 29 and albumin creatinine ratio >300 mg/g, Coronary artery disease involving native coronary artery of native heart (11/30/2018), Diabetic retinopathy, DM (diabetes mellitus) type II controlled, neurological manifestation, Exudative age-related macular degeneration of left eye with active choroidal neovascularization (2/5/2019), GERD (gastroesophageal reflux disease), Glaucoma, Heart failure, Hyperlipidemia, Hypertension, Non-ST elevation MI (NSTEMI) (11/28/2018), NSTEMI (non-ST elevated myocardial infarction) (11/27/2018), Peripheral neuropathy, Polyneuropathy, Proteinuria, Seizures, Skin ulcer of toe of right foot with fat layer exposed (10/14/2022), Stroke (2012,2014), Tobacco dependence, and Type 2 diabetes with peripheral circulatory disorder, controlled.     Avril presents to clinic today f/u right great toe pressure ulcer. She has been compliant with betadine application to the toe. She has no further pedal complaints.    Hemoglobin A1C   Date Value Ref Range Status   09/13/2022 5.3 4.0 - 5.6 % Final     Comment:     ADA Screening Guidelines:  5.7-6.4%  Consistent with prediabetes  >or=6.5%  Consistent with diabetes    High levels of fetal hemoglobin interfere with the HbA1C  assay. Heterozygous hemoglobin variants (HbS, HgC, etc)do  not significantly interfere with this assay.   However, presence of multiple variants may affect accuracy.      07/12/2022 9.3 (H) 4.0 - 5.6 % Final     Comment:     ADA Screening Guidelines:  5.7-6.4%  Consistent with prediabetes  >or=6.5%  Consistent with diabetes    High levels of fetal hemoglobin interfere with the HbA1C  assay. Heterozygous hemoglobin variants (HbS, HgC, etc)do  not significantly interfere with this assay.   However, presence of multiple variants may affect accuracy.     03/08/2022 10.8 (H) 4.0 - 5.6 % Final     Comment:     ADA Screening Guidelines:  5.7-6.4%  Consistent with prediabetes  >or=6.5%  Consistent with diabetes    High levels of fetal hemoglobin interfere with the HbA1C  assay. Heterozygous hemoglobin variants (HbS, HgC, etc)do  not significantly interfere with this assay.   However, presence of multiple variants may affect accuracy.     04/26/2021 7.3 % Final   10/26/2020 9.0 % Final     Comment:     Per Care Everywhere          PMH  Past Medical History:   Diagnosis Date    Acute hypoxemic respiratory failure 11/26/2018    Anemia     Arthritis     back, hand, knees    Back pain     Cataract     CKD stage G4/A3, GFR 15 - 29 and albumin creatinine ratio >300 mg/g     GFR 40% Jan 2014 and 33% ub 3/2014 (BRG)    Coronary artery disease involving native coronary artery of native heart 11/30/2018    Diabetic retinopathy     DM (diabetes mellitus) type II controlled, neurological manifestation     Exudative age-related macular degeneration of left eye with active choroidal neovascularization 2/5/2019    GERD (gastroesophageal reflux disease)     Glaucoma     Heart failure     Hyperlipidemia     Hypertension     Non-ST elevation MI (NSTEMI) 11/28/2018    NSTEMI (non-ST elevated myocardial infarction) 11/27/2018    Peripheral neuropathy     Polyneuropathy     Proteinuria     >6 mo     Seizures     BRG 1/2014 -dick CT NRI showed small vessel    Skin ulcer of toe of right foot with fat layer exposed 10/14/2022    Stroke 2012,2014    Tobacco dependence     Type 2 diabetes with peripheral circulatory  disorder, controlled        PROBLEM LIST  Patient Active Problem List    Diagnosis Date Noted    Skin ulcer of toe of right foot with fat layer exposed 10/14/2022    COVID 08/03/2022    S/P CABG (coronary artery bypass graft) 07/20/2022    NSTEMI (non-ST elevated myocardial infarction) 07/12/2022    Metabolic acidosis 07/12/2022    Chronic diastolic heart failure 12/20/2021    Elevated troponin 12/06/2021    Other specified complication of vascular prosthetic devices, implants and grafts, initial encounter 10/19/2021    Unspecified inflammatory spondylopathy, lumbar region 03/11/2021    Convulsions 03/11/2021    Hemiplegia 03/11/2021    Pulmonary edema 07/01/2020    DM type 2 with diabetic peripheral neuropathy 06/12/2020    Age-related nuclear cataract of left eye 05/06/2019    Non-rheumatic mitral regurgitation 04/05/2019    Non-rheumatic tricuspid valve insufficiency 04/05/2019    Exudative age-related macular degeneration of left eye with active choroidal neovascularization 02/05/2019    CAD (coronary artery disease) 11/30/2018    Pulmonary hypertension 11/28/2018    Hyperlipidemia associated with type 2 diabetes mellitus 06/08/2018    Iron deficiency anemia due to chronic blood loss 02/22/2018    Anemia in chronic kidney disease, on chronic dialysis 05/18/2017    History of CVA (cerebrovascular accident) 05/16/2017    Hyperparathyroidism 05/16/2017    Vitamin D deficiency 05/16/2017    DDD (degenerative disc disease), lumbar 05/16/2017    Bilateral carotid artery disease 11/11/2016    Aortic atherosclerosis 11/11/2016    Type 2 diabetes mellitus with circulatory disorder, with long-term current use of insulin 11/11/2016    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, with long-term current use of insulin 05/02/2016    Cataracta brunescens of right eye 05/02/2016    ESRD (end stage renal disease)     Proteinuria     Constipation 02/18/2014    Hypertension associated with diabetes         MEDS  Current Outpatient Medications on File Prior to Visit   Medication Sig Dispense Refill    aspirin (ECOTRIN) 81 MG EC tablet Take 1 tablet (81 mg total) by mouth once daily. 90 tablet 1    azelastine (ASTELIN) 137 mcg (0.1 %) nasal spray 1 spray (137 mcg total) by Nasal route 2 (two) times daily. 30 mL 0    blood sugar diagnostic (TRUE METRIX GLUCOSE TEST STRIP) Strp 1 strip by Misc.(Non-Drug; Combo Route) route 3 (three) times daily. 100 each 11    blood-glucose meter (TRUE METRIX GLUCOSE METER) Misc Use as instructed 1 each 0    cyanocobalamin (VITAMIN B-12) 1000 MCG tablet Take 100 mcg by mouth once daily.      folic acid (FOLVITE) 1 MG tablet Take 1 mg by mouth once daily.      lancets (TRUEPLUS LANCETS) 33 gauge Misc 1 lancet by Misc.(Non-Drug; Combo Route) route 3 (three) times daily. 100 each 11    metoprolol tartrate (LOPRESSOR) 50 MG tablet Take 1 tablet (50 mg total) by mouth 2 (two) times daily. 180 tablet 1    rosuvastatin (CRESTOR) 10 MG tablet Take 1 tablet (10 mg total) by mouth once daily. 90 tablet 3    sevelamer carbonate (RENVELA) 800 mg Tab Take 2 tablets (1,600 mg total) by mouth 3 (three) times daily. 180 tablet 0    telmisartan (MICARDIS) 20 MG Tab Take 1 tablet (20 mg total) by mouth once daily. 90 tablet 1    clopidogreL (PLAVIX) 75 mg tablet Take 1 tablet (75 mg total) by mouth once daily. 90 tablet 3    [DISCONTINUED] isosorbide-hydrALAZINE 20-37.5 mg (BIDIL) 20-37.5 mg Tab Take 1 tablet by mouth 3 (three) times daily. 90 tablet 1     No current facility-administered medications on file prior to visit.       Medication List with Changes/Refills   Current Medications    ASPIRIN (ECOTRIN) 81 MG EC TABLET    Take 1 tablet (81 mg total) by mouth once daily.    AZELASTINE (ASTELIN) 137 MCG (0.1 %) NASAL SPRAY    1 spray (137 mcg total) by Nasal route 2 (two) times daily.    BLOOD SUGAR DIAGNOSTIC (TRUE METRIX GLUCOSE TEST STRIP) STRP    1 strip by Misc.(Non-Drug; Combo Route) route 3  (three) times daily.    BLOOD-GLUCOSE METER (TRUE METRIX GLUCOSE METER) MISC    Use as instructed    CLOPIDOGREL (PLAVIX) 75 MG TABLET    Take 1 tablet (75 mg total) by mouth once daily.    CYANOCOBALAMIN (VITAMIN B-12) 1000 MCG TABLET    Take 100 mcg by mouth once daily.    FOLIC ACID (FOLVITE) 1 MG TABLET    Take 1 mg by mouth once daily.    LANCETS (TRUEPLUS LANCETS) 33 GAUGE MISC    1 lancet by Misc.(Non-Drug; Combo Route) route 3 (three) times daily.    METOPROLOL TARTRATE (LOPRESSOR) 50 MG TABLET    Take 1 tablet (50 mg total) by mouth 2 (two) times daily.    ROSUVASTATIN (CRESTOR) 10 MG TABLET    Take 1 tablet (10 mg total) by mouth once daily.    SEVELAMER CARBONATE (RENVELA) 800 MG TAB    Take 2 tablets (1,600 mg total) by mouth 3 (three) times daily.    TELMISARTAN (MICARDIS) 20 MG TAB    Take 1 tablet (20 mg total) by mouth once daily.       PSH     Past Surgical History:   Procedure Laterality Date    BREAST LUMPECTOMY Left     benign, when patient was 15 years old    BREAST MASS EXCISION      ,benign, age 15.    CATHETERIZATION OF BOTH LEFT AND RIGHT HEART N/A 11/28/2018    Procedure: CATHETERIZATION, HEART, BOTH LEFT AND RIGHT;  Surgeon: Michelle Holman MD;  Location: [facility] CATH LAB;  Service: Cardiology;  Laterality: N/A;    CATHETERIZATION OF BOTH LEFT AND RIGHT HEART N/A 11/30/2018    Procedure: CATHETERIZATION, HEART, BOTH LEFT AND RIGHT;  Surgeon: Michelle Holman MD;  Location: [facility] CATH LAB;  Service: Cardiology;  Laterality: N/A;    CORONARY ARTERY BYPASS GRAFT      CORONARY ARTERY BYPASS GRAFT (CABG) N/A 7/20/2022    Procedure: CORONARY ARTERY BYPASS GRAFT (CABG);  Surgeon: Sanju Locke MD;  Location: [facility] OR;  Service: Cardiothoracic;  Laterality: N/A;  2-VESSEL PUMP ASSIST WITH EPI-AORTIC ULTRASOUND    ENDOSCOPIC HARVEST OF VEIN Left 7/20/2022    Procedure: SURGICAL PROCUREMENT, VEIN, ENDOSCOPIC;  Surgeon: Sanju Locke MD;  Location: [facility] OR;  Service: Cardiothoracic;  Laterality:  Left;    HYSTERECTOMY  1982    INJECTION OF ANESTHETIC AGENT AROUND MULTIPLE INTERCOSTAL NERVES N/A 2022    Procedure: BLOCK, NERVE, INTERCOSTAL, 2 OR MORE;  Surgeon: Sanju Locke MD;  Location: [facility] OR;  Service: Cardiothoracic;  Laterality: N/A;  PARASTERNAL NERVE BLOCK    INSERTION OF SHUNT      LEFT HEART CATHETERIZATION Left 12/3/2018    Procedure: CATHETERIZATION, HEART, LEFT;  Surgeon: Michelle Holman MD;  Location: [facility] CATH LAB;  Service: Cardiology;  Laterality: Left;  LAD ATHERECTOMY STENT/ FEMORAL APPROACH    LEFT HEART CATHETERIZATION Left 2022    Procedure: CATHETERIZATION, HEART, LEFT;  Surgeon: Abhi Sloan MD;  Location: [facility] CATH LAB;  Service: Cardiology;  Laterality: Left;    OOPHORECTOMY      wrist cyst Right 1980s    dorsal wrist cyst        ALL  Review of patient's allergies indicates:   Allergen Reactions    Codeine Swelling    Darvon [propoxyphene] Swelling       SOC     Social History     Tobacco Use    Smoking status: Former     Packs/day: 1.50     Years: 30.00     Pack years: 45.00     Types: Cigarettes     Quit date: 1990     Years since quittin.8    Smokeless tobacco: Former   Substance Use Topics    Alcohol use: No     Alcohol/week: 0.0 standard drinks    Drug use: No         FAMILY HX    Family History   Problem Relation Age of Onset    Diabetes Mother     Hyperlipidemia Mother     Hypertension Mother     Heart disease Mother         MI    Muscular dystrophy Son     Cancer Maternal Grandmother         colon            REVIEW OF SYSTEMS   General: This patient is well-developed, well-nourished and appears stated age, well-oriented to person, place and time, and cooperative and pleasant on today's visit  Constitutional: Negative for chills and fever.   Respiratory: Negative for shortness of breath.    Cardiovascular: Negative for chest pain, palpitations, orthopnea  Gastrointestinal: Negative for diarrhea, nausea and vomiting.   Musculoskeletal: Positive  "for above noted in HPI  Skin: positive for skin and nail changes  Neurological: positive for tingling and sensory changes  Peripheral Vascular: no claudication or cyanosis  Psychiatric/Behavioral: Negative for altered mental status     PHYSICAL EXAM:      Vitals:    10/24/22 1540   Weight: 51.3 kg (113 lb)   Height: 5' 4" (1.626 m)   PainSc:   4       General: This patient is well-developed, well-nourished and appears stated age, well-oriented to person, place and time, and cooperative and pleasant on today's visit    LOWER EXTREMITY  VASCULAR  Diminished pedal pulses b/l  Capillary refill time immediate to the toes.   Feet are warm to the touch. Skin temperature warm to warm from proximally to distally   There are varicosities, telangiectasias noted to bilateral foot and ankle regions.   There are no ecchymoses noted to bilateral foot and ankle regions.   There is gross lower extremity edema.    DERMATOLOGIC  Skin moist with healthy texture and turgor.  There are no open ulcerations, lacerations, or fissures to bilateral foot and ankle regions. There are no signs of infection as there is no erythema, no proximal-extending lymphangiitis, no fluctuance, or crepitus noted on palpation to bilateral foot and ankle regions.   There is no interdigital maceration.   There is pressure ulcer distal tip right hallux with small eschar    NEUROLOGIC  Epicritic sensation is diminished.   Achilles and patellar deep tendon reflexes intact  Babinski reflex absent    ORTHOPEDIC/BIOMECHANICAL  Muscle strength AT/EHL/EDL/PT: 5/5; Achilles/Gastroc/Soleus: 5/5; PB/PL: 5/5 Muscle tone is normal.  Ankle joint ROM INTACT DF/PF, non-crepitus  STJ ROM  inv/ev, non crepitus         ASSESSMENT     Encounter Diagnoses   Name Primary?    Eschar of toe Yes    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, with long-term current use of insulin            PLAN  Patient was educated about clinical and imaging findings, " and verbalizes understanding of above.     Continue with betadine once daily and complete offloading    The patient is alerted to watch for any signs of infection (redness, pus, pain, increased swelling or fever) and call if such occurs or report to Emergency room if after hours.        Future Appointments   Date Time Provider Department Center   10/25/2022  8:00 AM Natty Mistry NP [facility] DIABETE [facility]   11/8/2022  1:30 PM Conchita Garcia RD, CDE [facility] DIBEDU [facility]   12/7/2022 11:30 AM Natty Mistry NP [facility]    12/20/2022  2:20 PM Hattie Stephens MD [facility] PULMSVC [facility]   12/22/2022  2:20 PM Demetrio Mejia MD ON CARDIO [facility]   1/10/2023  7:00 AM CARDIOVASCULAR, [facility] SPECCPR [facility]   1/19/2023  2:20 PM Michelle Holman MD ON CARDIO [facility]   3/3/2023 10:00 AM JADIEL Coello MD [facility]       Report Electronically Signed By:    Anette Montanez DPM   Podiatric Medicine & Surgery  [facility]  10/24/2022     Disclaimer:  This note may have been prepared using voice recognition software, it may have not been extensively proofed, as such there could be errors within the text such as sound alike errors.

## 2022-10-25 ENCOUNTER — OFFICE VISIT (OUTPATIENT)
Dept: DIABETES | Facility: CLINIC | Age: 77
End: 2022-10-25
Payer: MEDICARE

## 2022-10-25 DIAGNOSIS — E11.51 TYPE 2 DIABETES MELLITUS WITH DIABETIC PERIPHERAL ANGIOPATHY WITHOUT GANGRENE, WITH LONG-TERM CURRENT USE OF INSULIN: Primary | ICD-10-CM

## 2022-10-25 DIAGNOSIS — E11.69 HYPERLIPIDEMIA ASSOCIATED WITH TYPE 2 DIABETES MELLITUS: ICD-10-CM

## 2022-10-25 DIAGNOSIS — E11.59 HYPERTENSION ASSOCIATED WITH DIABETES: ICD-10-CM

## 2022-10-25 DIAGNOSIS — E78.5 HYPERLIPIDEMIA ASSOCIATED WITH TYPE 2 DIABETES MELLITUS: ICD-10-CM

## 2022-10-25 DIAGNOSIS — Z79.4 TYPE 2 DIABETES MELLITUS WITH DIABETIC PERIPHERAL ANGIOPATHY WITHOUT GANGRENE, WITH LONG-TERM CURRENT USE OF INSULIN: Primary | ICD-10-CM

## 2022-10-25 DIAGNOSIS — I15.2 HYPERTENSION ASSOCIATED WITH DIABETES: ICD-10-CM

## 2022-10-25 PROCEDURE — 3044F PR MOST RECENT HEMOGLOBIN A1C LEVEL <7.0%: ICD-10-PCS | Mod: CPTII,95,, | Performed by: NURSE PRACTITIONER

## 2022-10-25 PROCEDURE — 1159F PR MEDICATION LIST DOCUMENTED IN MEDICAL RECORD: ICD-10-PCS | Mod: CPTII,95,, | Performed by: NURSE PRACTITIONER

## 2022-10-25 PROCEDURE — 99442 PR PHYSICIAN TELEPHONE EVALUATION 11-20 MIN: CPT | Mod: 95,,, | Performed by: NURSE PRACTITIONER

## 2022-10-25 PROCEDURE — 1160F RVW MEDS BY RX/DR IN RCRD: CPT | Mod: CPTII,95,, | Performed by: NURSE PRACTITIONER

## 2022-10-25 PROCEDURE — 3044F HG A1C LEVEL LT 7.0%: CPT | Mod: CPTII,95,, | Performed by: NURSE PRACTITIONER

## 2022-10-25 PROCEDURE — 3066F NEPHROPATHY DOC TX: CPT | Mod: CPTII,95,, | Performed by: NURSE PRACTITIONER

## 2022-10-25 PROCEDURE — 99442 PR PHYSICIAN TELEPHONE EVALUATION 11-20 MIN: ICD-10-PCS | Mod: 95,,, | Performed by: NURSE PRACTITIONER

## 2022-10-25 PROCEDURE — 1159F MED LIST DOCD IN RCRD: CPT | Mod: CPTII,95,, | Performed by: NURSE PRACTITIONER

## 2022-10-25 PROCEDURE — 4010F ACE/ARB THERAPY RXD/TAKEN: CPT | Mod: CPTII,95,, | Performed by: NURSE PRACTITIONER

## 2022-10-25 PROCEDURE — 4010F PR ACE/ARB THEARPY RXD/TAKEN: ICD-10-PCS | Mod: CPTII,95,, | Performed by: NURSE PRACTITIONER

## 2022-10-25 PROCEDURE — 1160F PR REVIEW ALL MEDS BY PRESCRIBER/CLIN PHARMACIST DOCUMENTED: ICD-10-PCS | Mod: CPTII,95,, | Performed by: NURSE PRACTITIONER

## 2022-10-25 PROCEDURE — 3066F PR DOCUMENTATION OF TREATMENT FOR NEPHROPATHY: ICD-10-PCS | Mod: CPTII,95,, | Performed by: NURSE PRACTITIONER

## 2022-10-25 NOTE — PROGRESS NOTES
Established Patient - Audio Only Telehealth Visit     The patient location is: Home ([place])  The chief complaint leading to consultation is: Diabetes Follow-up  Visit type: Virtual visit with audio only (telephone)  Total time spent with patient: 20 minutes    The reason for the audio only service rather than synchronous audio and video virtual visit was related to technical difficulties or patient preference/necessity.     Each patient to whom I provide medical services by telemedicine is:  (1) informed of the relationship between the physician and patient and the respective role of any other health care provider with respect to management of the patient; and (2) notified that they may decline to receive medical services by telemedicine and may withdraw from such care at any time. Patient verbally consented to receive this service via voice-only telephone call.    This service was not originating from a related E/M service provided within the previous 7 days nor will  to an E/M service or procedure within the next 24 hours or my soonest available appointment.  Prevailing standard of care was able to be met in this audio-only visit.     PCP: Rose Parks MD    Subjective:     Chief Complaint: Diabetes follow up.  Last visit with me: 9/13/2022    HPI: 77 y.o.  female for diabetes follow up.   Previously managed by JACK Pierce.   Last clinic visit 1/2016.  Type II diabetes since age 44.  Comorbidities: HTN, HLD, CHF, ESRD, CVA, MI, Dialysis Use - Sched MWF 10-3 PM, Legally blind.  s/p CABG 07/2022  Daughter Elizabeth as primary historian during this visit.  ESRD on dialysis x 2 years    Family History of Type 1/2 DM: mother  Known diabetes complications:  DKA/HHS: no  Retinopathy: yes  Peripheral neuropathy: yes  Nephropathy: yes    Interval history:  Since last visit, daughter  had hospital admission 9/18 due to CHF.  Was sent home with insulin changed to Lantus.  Admits to  having early AM BG readings on the 70's  Endorses diabetes related symptoms: Tingling in Lower Extremities and Both fingers    Blood glucose monitoring: via fingerstick   Fasting BG    Postprandial 120 to low 200's at times   AC dinner  300's    Current DM Medications:    Activity level: Sedentary  Meal Plannin meals/day; infrequent snacks/day.    Medication compliance all of the time, diet compliance most of the time.   To be enrolled in diabetes education classes.     Past failed treatment:  none    Current DM Medications:  Diabetes Medications               insulin NPH/Reg human (HUMULIN, 70/30,) 100 unit/mL (70-30) InPn pen To do 28 units subq in AM and 8 units subq in PM.  Dose to be done within 15 min before or after meal.  Not taking    semaglutide (OZEMPIC) 0.25 mg or 0.5 mg(2 mg/1.5 mL) pen injector Inject 0.25 mg into the skin every 7 days. On hold    Lantus 10 units qhs        Allergies  Review of patient's allergies indicates:   Allergen Reactions    Codeine Swelling    Darvon [propoxyphene] Swelling    Atorvastatin      Muscle twitching       Labs Reviewed:  Her most recent A1C is:  Lab Results   Component Value Date    HGBA1C 5.3 2022    HGBA1C 9.3 (H) 2022    HGBA1C 10.8 (H) 2022       Her most recent BMP is:  Lab Results   Component Value Date     (L) 2022    K 4.2 2022    CL 99 2022    CO2 23 2022    BUN 36 (H) 2022    CREATININE 5.0 (H) 2022    CALCIUM 9.2 2022    ANIONGAP 12 2022    ESTGFRAFRICA 8 (A) 2022    ESTGFRAFRICA 8 (A) 2022    EGFRNONAA 7 (A) 2022    EGFRNONAA 7 (A) 2022       No results found for: CPEPTIDE  No results found for: GLUTAMICACID    There is no height or weight on file to calculate BMI.    Wt Readings from Last 3 Encounters:   10/24/22 1540 51.3 kg (113 lb)   10/14/22 1511 51.5 kg (113 lb 8.6 oz)   10/11/22 1404 52.2 kg (115 lb)        Her blood sugar in clinic today is:  Not assessed due to type of visit.    Diabetes Management Status  Statin: Taking  ACE/ARB: Taking    Screening or Prevention Patient's value Goal Complete/Controlled?   HgA1C Testing and Control   Lab Results   Component Value Date    HGBA1C 5.3 2022      Annually/Less than 8% Yes     Lipid profile : 2022 Annually Yes     LDL control Lab Results   Component Value Date    LDLCALC 102.8 2022    Annually/Less than 100 mg/dl  No     Nephropathy screening Lab Results   Component Value Date    MICALBCREAT 1244.0 (H) 2018     Lab Results   Component Value Date    PROTEINUA 3+ (A) 2022    Annually Yes     Blood pressure BP Readings from Last 1 Encounters:   10/14/22 (!) 142/60    Less than 140/90 No     Dilated retinal exam : 2022 Annually Yes    Foot exam   : 2022 Annually Yes       Social History     Socioeconomic History    Marital status:    Tobacco Use    Smoking status: Former     Packs/day: 1.50     Years: 30.00     Pack years: 45.00     Types: Cigarettes     Quit date: 1990     Years since quittin.8    Smokeless tobacco: Former   Substance and Sexual Activity    Alcohol use: No     Alcohol/week: 0.0 standard drinks    Drug use: No    Sexual activity: Not Currently     Past Medical History:   Diagnosis Date    Acute hypoxemic respiratory failure 2018    Anemia     Arthritis     back, hand, knees    Back pain     Cataract     CKD stage G4/A3, GFR 15 - 29 and albumin creatinine ratio >300 mg/g     GFR 40% 2014 and 33% ub 3/2014 (BRG)    Coronary artery disease involving native coronary artery of native heart 2018    Diabetic retinopathy     DM (diabetes mellitus) type II controlled, neurological manifestation     Exudative age-related macular degeneration of left eye with active choroidal neovascularization 2019    GERD (gastroesophageal reflux disease)     Glaucoma     Heart failure     Hyperlipidemia     Hypertension     Non-ST elevation MI  (NSTEMI) 11/28/2018    NSTEMI (non-ST elevated myocardial infarction) 11/27/2018    Peripheral neuropathy     Polyneuropathy     Proteinuria     >6 mo     Seizures     BRG 1/2014 -dick CT NRI showed small vessel    Skin ulcer of toe of right foot with fat layer exposed 10/14/2022    Stroke 2012,2014    Tobacco dependence     Type 2 diabetes with peripheral circulatory disorder, controlled        Review of Systems   Constitutional: Negative for activity change, appetite change, fatigue and unexpected weight change.   HENT: Negative for dental problem and trouble swallowing.    Eyes: Negative for visual disturbance.   Respiratory: Negative for chest tightness and shortness of breath.    Cardiovascular: Negative for chest pain, palpitations and leg swelling.   Gastrointestinal: Negative for abdominal pain, constipation, diarrhea, nausea and vomiting.   Endocrine: Negative for polydipsia, polyphagia and polyuria.   Genitourinary: Negative for difficulty urinating, dysuria, frequency and urgency.        Negative yeast infection   Musculoskeletal: Negative for gait problem, myalgias and neck pain.   Integumentary:  Negative for rash and wound.   Allergic/Immunologic: Negative.    Neurological: Positive for numbness (intermittent both feet). Negative for dizziness, syncope, weakness and headaches.        Int tingling BLE, hands   Hematological: Negative.    Psychiatric/Behavioral: Negative for confusion and sleep disturbance.   All other systems reviewed and are negative.     Objective:      Physical Exam  Neurological:      Mental Status: He is alert and oriented to person, place, and time. Mental status is at baseline.   Psychiatric:         Attention and Perception: Attention normal.         Mood and Affect: Mood normal.         Speech: Speech normal.         Thought Content: Thought content normal.         Cognition and Memory: Cognition normal.         Judgment: Judgment normal.     Assessment / Plan:     Diagnoses and  all orders for this visit:    Type 2 diabetes mellitus with diabetic peripheral angiopathy without gangrene, with long-term current use of insulin    Hypertension associated with diabetes    Hyperlipidemia associated with type 2 diabetes mellitus       Additional Plan Details:  - Condition: uncontrolled, most recent A1c of 5.3% was completed 6 weeks ago, however had low BG readings at previous visits.   - Monitor blood glucose:  2x daily.Goals reviewed. Maintain BG log. Pt would benefit from CGM use. Will order [employer] 2 supplies. Send BG logs in 1-2 weeks for review.  - CGM note: Patient is testing 4 times per day. Patient is injecting meal time insulin 2 times daily. Patient requires CGM for blood sugar monitoring due to frequent insulin dose changes. Patient reports hypoglycemia, < 50, hypoglycemia unawareness, severe glucose excursions   - Hypoglycemia protocol: Reviewed and risk discussed.  Notify if BG readings below 80. Protocol printout provided.   - Diet: Limit carbohydrate intake 30-45 grams/meal, 3x daily and 15 grams/snack, limit 2/day.   - Activity: Recommend at least 150 minutes of exercise in a week.  - BP and LDL: Recommend lifestyle modifications and follow up with PCP for management.   - Medication Regimen:    - Decrease Lantus 8 units qhs   - Hold Ozempic for now    - Medication consideration: Discussed with daughter need for mealtime insulin coverage to manage PP hyperglycemia. Option to resume Novolin 70/30 or add mealtime insulin. She states that mom wants to stay with current medication of Lantus only.  - Lab results: A1C discussed.  - Health Maintenance standards addressed today:  A1c to be scheduled.   - Risks of uncontrolled DM explained. Importance of routine foot and eye exams reviewed. Scheduled as appropriate.  -The patient was explained the above plan and given opportunity to ask questions.  She understands, chooses and consents to this plan and accepts all the risks, which include but  are not limited to the risks mentioned above.   - Labs ordered as above.  - CDE follow up: Scheduled in 2 weeks.  - Follow up: 4-6 weeks audio.           Charlotte T. Delacruz, FNP-C [facility]    A total of 20 minutes was spent in face to face time, of which over 50 % was spent in counseling patient on disease process, complications, treatment, and side effects of medications.

## 2022-10-28 RX ORDER — INSULIN GLARGINE 100 [IU]/ML
10 INJECTION, SOLUTION SUBCUTANEOUS NIGHTLY
COMMUNITY
End: 2023-01-03 | Stop reason: SDUPTHER

## 2022-10-28 NOTE — PATIENT INSTRUCTIONS
Medication Regimen:   - Decrease Lantus 8 units every evening    Patient Instructions:  Carbohydrate Count: 30-45 grams/meal and 15 grams/snack with limit of 2 snacks per day.  Exercise: Goal is 150 minutes or more per week.  Bring meter and blood sugar log to each appointment.     Goals for Blood Sugar:  Fastin-130 mg/dl  2 hours after meal:  mg/dl  Before Bed: 100-150 mg/dl  If Blood sugar is below 70 mg/dl, DO NOT take diabetes medication. Eat first and then recheck blood sugar in 20 minutes.  Foods to eat if blood sugar is below 70 mg/dl  1/2 glass of orange juice   1/2 regular cola can   3-4 hard candies   3-4 small glucose tabs.   Foods to eat if blood sugar is below 50 mg/dl  1 glass of orange juice  1 regular cola can  8 hard candies  8 small glucose tabs.   If you have repeated eating/blood sugar recheck process 2 times and blood sugar still below 70 mg/dl, call 911.

## 2022-11-01 ENCOUNTER — OFFICE VISIT (OUTPATIENT)
Dept: PODIATRY | Facility: CLINIC | Age: 77
End: 2022-11-01
Payer: MEDICARE

## 2022-11-01 ENCOUNTER — TELEPHONE (OUTPATIENT)
Dept: CARDIOLOGY | Facility: CLINIC | Age: 77
End: 2022-11-01
Payer: MEDICARE

## 2022-11-01 VITALS — BODY MASS INDEX: 19.31 KG/M2 | HEIGHT: 64 IN | WEIGHT: 113.13 LBS

## 2022-11-01 DIAGNOSIS — I73.9 PERIPHERAL ARTERIAL DISEASE: ICD-10-CM

## 2022-11-01 DIAGNOSIS — L97.512 SKIN ULCER OF RIGHT GREAT TOE WITH FAT LAYER EXPOSED: Primary | ICD-10-CM

## 2022-11-01 DIAGNOSIS — E11.42 CONTROLLED TYPE 2 DIABETES MELLITUS WITH DIABETIC POLYNEUROPATHY, WITH LONG-TERM CURRENT USE OF INSULIN: ICD-10-CM

## 2022-11-01 DIAGNOSIS — E11.3553 TYPE 2 DIABETES MELLITUS WITH STABLE PROLIFERATIVE RETINOPATHY OF BOTH EYES, WITH LONG-TERM CURRENT USE OF INSULIN: ICD-10-CM

## 2022-11-01 DIAGNOSIS — Z79.4 TYPE 2 DIABETES MELLITUS WITH STABLE PROLIFERATIVE RETINOPATHY OF BOTH EYES, WITH LONG-TERM CURRENT USE OF INSULIN: ICD-10-CM

## 2022-11-01 DIAGNOSIS — Z79.4 CONTROLLED TYPE 2 DIABETES MELLITUS WITH DIABETIC POLYNEUROPATHY, WITH LONG-TERM CURRENT USE OF INSULIN: ICD-10-CM

## 2022-11-01 PROCEDURE — 4010F ACE/ARB THERAPY RXD/TAKEN: CPT | Mod: CPTII,S$GLB,, | Performed by: PODIATRIST

## 2022-11-01 PROCEDURE — 1101F PR PT FALLS ASSESS DOC 0-1 FALLS W/OUT INJ PAST YR: ICD-10-PCS | Mod: CPTII,S$GLB,, | Performed by: PODIATRIST

## 2022-11-01 PROCEDURE — 3066F NEPHROPATHY DOC TX: CPT | Mod: CPTII,S$GLB,, | Performed by: PODIATRIST

## 2022-11-01 PROCEDURE — 99999 PR PBB SHADOW E&M-EST. PATIENT-LVL III: CPT | Mod: PBBFAC,,, | Performed by: PODIATRIST

## 2022-11-01 PROCEDURE — 3288F PR FALLS RISK ASSESSMENT DOCUMENTED: ICD-10-PCS | Mod: CPTII,S$GLB,, | Performed by: PODIATRIST

## 2022-11-01 PROCEDURE — 1160F RVW MEDS BY RX/DR IN RCRD: CPT | Mod: CPTII,S$GLB,, | Performed by: PODIATRIST

## 2022-11-01 PROCEDURE — 99499 NO LOS: ICD-10-PCS | Mod: S$GLB,,, | Performed by: PODIATRIST

## 2022-11-01 PROCEDURE — 1159F MED LIST DOCD IN RCRD: CPT | Mod: CPTII,S$GLB,, | Performed by: PODIATRIST

## 2022-11-01 PROCEDURE — 11042 DBRDMT SUBQ TIS 1ST 20SQCM/<: CPT | Mod: S$GLB,,, | Performed by: PODIATRIST

## 2022-11-01 PROCEDURE — 1101F PT FALLS ASSESS-DOCD LE1/YR: CPT | Mod: CPTII,S$GLB,, | Performed by: PODIATRIST

## 2022-11-01 PROCEDURE — 99499 UNLISTED E&M SERVICE: CPT | Mod: S$GLB,,, | Performed by: PODIATRIST

## 2022-11-01 PROCEDURE — 3044F PR MOST RECENT HEMOGLOBIN A1C LEVEL <7.0%: ICD-10-PCS | Mod: CPTII,S$GLB,, | Performed by: PODIATRIST

## 2022-11-01 PROCEDURE — 3288F FALL RISK ASSESSMENT DOCD: CPT | Mod: CPTII,S$GLB,, | Performed by: PODIATRIST

## 2022-11-01 PROCEDURE — 1159F PR MEDICATION LIST DOCUMENTED IN MEDICAL RECORD: ICD-10-PCS | Mod: CPTII,S$GLB,, | Performed by: PODIATRIST

## 2022-11-01 PROCEDURE — 11042 PR DEBRIDEMENT, SKIN, SUB-Q TISSUE,=<20 SQ CM: ICD-10-PCS | Mod: S$GLB,,, | Performed by: PODIATRIST

## 2022-11-01 PROCEDURE — 1125F AMNT PAIN NOTED PAIN PRSNT: CPT | Mod: CPTII,S$GLB,, | Performed by: PODIATRIST

## 2022-11-01 PROCEDURE — 4010F PR ACE/ARB THEARPY RXD/TAKEN: ICD-10-PCS | Mod: CPTII,S$GLB,, | Performed by: PODIATRIST

## 2022-11-01 PROCEDURE — 3066F PR DOCUMENTATION OF TREATMENT FOR NEPHROPATHY: ICD-10-PCS | Mod: CPTII,S$GLB,, | Performed by: PODIATRIST

## 2022-11-01 PROCEDURE — 1125F PR PAIN SEVERITY QUANTIFIED, PAIN PRESENT: ICD-10-PCS | Mod: CPTII,S$GLB,, | Performed by: PODIATRIST

## 2022-11-01 PROCEDURE — 99999 PR PBB SHADOW E&M-EST. PATIENT-LVL III: ICD-10-PCS | Mod: PBBFAC,,, | Performed by: PODIATRIST

## 2022-11-01 PROCEDURE — 3044F HG A1C LEVEL LT 7.0%: CPT | Mod: CPTII,S$GLB,, | Performed by: PODIATRIST

## 2022-11-01 PROCEDURE — 1160F PR REVIEW ALL MEDS BY PRESCRIBER/CLIN PHARMACIST DOCUMENTED: ICD-10-PCS | Mod: CPTII,S$GLB,, | Performed by: PODIATRIST

## 2022-11-01 NOTE — TELEPHONE ENCOUNTER
----- Message from Reginaldo Sagastume sent at 11/1/2022  4:18 PM CDT -----  Contact: daughter  Pt's daughter Elizabeth is asking for an return call in reference to questions concerning mom medication , please  call back at.[phone] Thx CJ

## 2022-11-01 NOTE — TELEPHONE ENCOUNTER
Spoke with pt daughter in regards to medication questions about PLAVIX, wanted to make sure her mother is no longer taking it.    I informed pt that Dr. Mejia did cancel the plavix and pt is no longer taking it.        Pt daughter verbalized understanding with no questions or concerns.

## 2022-11-01 NOTE — PROGRESS NOTES
Subjective:       Patient ID: Avril Monzon is a 77 y.o. female.    Chief Complaint: Diabetic Foot Ulcer (C/o DFU, pt explains its her hallux and 3rd digit toe on the right foot, rates pain 2/10, diabetic, wearing socks and shoes, last seen PCP Dr. Germain on 10/13/2022.)    HPI: Avril Monzon presents to the office today with a wound present to the distal aspect of the right great toe.  States that this subsequently was result after patient sustained open heart surgery and triple bypass.  Relates that she then noticed her toes start to turn black.  She has been following with Dr. Anette Montanez due to the eschar.  States 2/10 pain distally to the toe.  Currently utilizing Betadine to the distal area.  Denies any new onset redness or swelling.  No evidence of drainage.    Review of patient's allergies indicates:   Allergen Reactions    Codeine Swelling    Darvon [propoxyphene] Swelling    Atorvastatin      Muscle twitching       Past Medical History:   Diagnosis Date    Acute hypoxemic respiratory failure 11/26/2018    Anemia     Arthritis     back, hand, knees    Back pain     Cataract     CKD stage G4/A3, GFR 15 - 29 and albumin creatinine ratio >300 mg/g     GFR 40% Jan 2014 and 33% ub 3/2014 (BRG)    Coronary artery disease involving native coronary artery of native heart 11/30/2018    Diabetic retinopathy     DM (diabetes mellitus) type II controlled, neurological manifestation     Exudative age-related macular degeneration of left eye with active choroidal neovascularization 2/5/2019    GERD (gastroesophageal reflux disease)     Glaucoma     Heart failure     Hyperlipidemia     Hypertension     Non-ST elevation MI (NSTEMI) 11/28/2018    NSTEMI (non-ST elevated myocardial infarction) 11/27/2018    Peripheral neuropathy     Polyneuropathy     Proteinuria     >6 mo     Seizures     BRG 1/2014 -dick CT NRI showed small vessel    Skin ulcer of toe of right foot with fat layer exposed 10/14/2022    Stroke  ,    Tobacco dependence     Type 2 diabetes with peripheral circulatory disorder, controlled        Family History   Problem Relation Age of Onset    Diabetes Mother     Hyperlipidemia Mother     Hypertension Mother     Heart disease Mother         MI    Muscular dystrophy Son     Cancer Maternal Grandmother         colon       Social History     Socioeconomic History    Marital status:    Tobacco Use    Smoking status: Former     Packs/day: 1.50     Years: 30.00     Pack years: 45.00     Types: Cigarettes     Quit date: 1990     Years since quittin.8    Smokeless tobacco: Former   Substance and Sexual Activity    Alcohol use: No     Alcohol/week: 0.0 standard drinks    Drug use: No    Sexual activity: Not Currently       Past Surgical History:   Procedure Laterality Date    BREAST LUMPECTOMY Left     benign, when patient was 15 years old    BREAST MASS EXCISION      ,benign, age 15.    CATHETERIZATION OF BOTH LEFT AND RIGHT HEART N/A 2018    Procedure: CATHETERIZATION, HEART, BOTH LEFT AND RIGHT;  Surgeon: Michelle Holman MD;  Location: [facility] CATH LAB;  Service: Cardiology;  Laterality: N/A;    CATHETERIZATION OF BOTH LEFT AND RIGHT HEART N/A 2018    Procedure: CATHETERIZATION, HEART, BOTH LEFT AND RIGHT;  Surgeon: Michelle Holman MD;  Location: [facility] CATH LAB;  Service: Cardiology;  Laterality: N/A;    CORONARY ARTERY BYPASS GRAFT      CORONARY ARTERY BYPASS GRAFT (CABG) N/A 2022    Procedure: CORONARY ARTERY BYPASS GRAFT (CABG);  Surgeon: Sanju Locke MD;  Location: [facility] OR;  Service: Cardiothoracic;  Laterality: N/A;  2-VESSEL PUMP ASSIST WITH EPI-AORTIC ULTRASOUND    ENDOSCOPIC HARVEST OF VEIN Left 2022    Procedure: SURGICAL PROCUREMENT, VEIN, ENDOSCOPIC;  Surgeon: Sanju Locke MD;  Location: [facility] OR;  Service: Cardiothoracic;  Laterality: Left;    HYSTERECTOMY  1982    INJECTION OF ANESTHETIC AGENT AROUND MULTIPLE INTERCOSTAL NERVES N/A 2022     "Procedure: BLOCK, NERVE, INTERCOSTAL, 2 OR MORE;  Surgeon: Sanju Locke MD;  Location: [facility] OR;  Service: Cardiothoracic;  Laterality: N/A;  PARASTERNAL NERVE BLOCK    INSERTION OF SHUNT      LEFT HEART CATHETERIZATION Left 12/3/2018    Procedure: CATHETERIZATION, HEART, LEFT;  Surgeon: Michelle Holman MD;  Location: [facility] CATH LAB;  Service: Cardiology;  Laterality: Left;  LAD ATHERECTOMY STENT/ FEMORAL APPROACH    LEFT HEART CATHETERIZATION Left 7/13/2022    Procedure: CATHETERIZATION, HEART, LEFT;  Surgeon: Abhi Sloan MD;  Location: [facility] CATH LAB;  Service: Cardiology;  Laterality: Left;    OOPHORECTOMY      wrist cyst Right 1980s    dorsal wrist cyst       Review of Systems       Objective:   Ht 5' 4" (1.626 m)   Wt 51.3 kg (113 lb 1.5 oz)   LMP  (LMP Unknown)   BMI 19.41 kg/m²     X-Ray Chest PA And Lateral  Narrative: EXAMINATION:  XR CHEST PA AND LATERAL    CLINICAL HISTORY:  COVID-19    TECHNIQUE:  PA and lateral views of the chest were performed.    COMPARISON:  08/25/2022    FINDINGS:  Small to moderate-sized bilateral pleural effusions noted.  The fusions appear to be minimally larger when compared to the prior study.  The heart is enlarged.  There is evidence for prior median sternotomy.  There is some persistent mild prominence of the central pulmonary vasculature.  Impression: As above    Electronically signed by: Deniz Cruz DO  Date:    09/01/2022  Time:    13:05  Color Fundus Photography - OU - Both Eyes  See progress note.  Posterior Segment OCT Retina-Both eyes  Right Eye  Quality was good. Scan locations included subfoveal, juxtafoveal,   extrafoveal, nasal, temporal, superior, inferior. Progression has been   stable. Findings include normal foveal contour.     Left Eye  Quality was good. Scan locations included subfoveal, juxtafoveal,   extrafoveal, nasal, temporal, superior, inferior. Progression has been   stable. Findings include normal foveal contour.     Notes  See " progress note.       Physical Exam   LOWER EXTREMITY PHYSICAL EXAMINATION    Vascular:  The right dorsalis pedis pulse 1/4 and the right posterior tibial pulse 1/4.  The left dorsalis pedis pulse 1/4 and posterior tibial pulse on the left is 1/4.  Capillary refill is intact.  Pedal hair growth decreased.     Neurological:  Protective sensation is not intact to the right left foot.  Vibratory sensation is diminished.  Proprioception is intact.  Sharp/dull is reduced.    Musculoskeletal: Manual Muscle Testing is 5/5 with dorsiflexion, plantar flexion, abduction, and adduction.   There is normal range of motion in the forefoot, hindfoot, and Ankle joint.     Dermatological:  Skin is thin, shiny, and atrophic.  There is an eschar present to the distal aspect of the right great toe    Ulcer#1  Ulcer location:  Right great toe  Pre debridement Measurements:  Eschar  Post debridement Measurements :  0.2 cm x 0.4 cm x 0.05 cm  Signs of infection:  None  Erythema:  None  Undermining/Tunneling:  None.  Does not probe to bone  Drainage:  None  Periwound skin:  Slightly hyperkeratotic  Wound Base:  Granular          Assessment:     1. Skin ulcer of right great toe with fat layer exposed    2. 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 Controlled type 2 diabetes mellitus with diabetic polyneuropathy, with long-term current use of insulin    4. Peripheral arterial disease        Plan:     Skin ulcer of right great toe with fat layer exposed    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, with long-term current use of insulin    Controlled type 2 diabetes mellitus with diabetic polyneuropathy, with long-term current use of insulin    Peripheral arterial disease    Thorough discussion is had with the patient today, concerning the diagnosis, its etiology, and the treatment algorithm at present.    Did discuss this is likely  associated with a small clot being travel distally to the digits causing slight necrosis of the skin distally.  Appears this been doing quite well.  Based on the improvement of the eschar, I am not as concerned with arterial studies but if there are no improvement or this does progress, I would recommend arterial studies soon.    Continue with Betadine dressings as discussed in clinic.    The wound was surgically debrided after adequate prep with alcohol and/or betadine paint. Excisional wound debridement was performed using sharp #10/#15 blade/rounded scalpel and tissue nipper, with removal of all non-viable skin and soft tissues; necrotic skin/tissue formation. The woundbase/wound bed was also debrided to encourage bleeding as to promote/stimulate healing. Debridement was excisional and included epidermal, dermal and subcutaneous tissues. Post debridement measurements are as above. Hemostasis was achieved. Patient tolerated procedure well and without complications. Local woundcare with Betadine dressings and bandage thereafter.       Future Appointments   Date Time Provider Department Center   11/8/2022  1:30 PM Conchita Garcia RD, CDE [facility] DIBEDU [facility]   12/7/2022 11:30 AM Natty Mistry NP [facility] SIM [place]   12/13/2022  2:20 PM Mariah Brewster MD [facility] DERM [facility]   12/20/2022  2:20 PM Hattie Stephens MD [facility] PULMSVC [facility]   12/22/2022  2:20 PM Demetrio Mejia MD [facility] CARDIO [facility]   1/10/2023  7:00 AM CARDIOVASCULAR, [facility] HGV[facility] SPECCPR [facility]   1/19/2023  2:20 PM Michelle Holman MD [facility] CARDIO [facility] Medical C   3/3/2023 10:00 AM JADIEL Coello MD [facility] OPHTHAL [facility]

## 2022-11-07 ENCOUNTER — TELEPHONE (OUTPATIENT)
Dept: ADMINISTRATIVE | Facility: HOSPITAL | Age: 77
End: 2022-11-07
Payer: MEDICARE

## 2022-11-08 ENCOUNTER — CLINICAL SUPPORT (OUTPATIENT)
Dept: DIABETES | Facility: CLINIC | Age: 77
End: 2022-11-08
Payer: MEDICARE

## 2022-11-08 VITALS — WEIGHT: 117.5 LBS | BODY MASS INDEX: 20.17 KG/M2

## 2022-11-08 DIAGNOSIS — Z79.4 TYPE 2 DIABETES MELLITUS WITH STABLE PROLIFERATIVE RETINOPATHY OF BOTH EYES, WITH LONG-TERM CURRENT USE OF INSULIN: Primary | ICD-10-CM

## 2022-11-08 DIAGNOSIS — E11.3553 TYPE 2 DIABETES MELLITUS WITH STABLE PROLIFERATIVE RETINOPATHY OF BOTH EYES, WITH LONG-TERM CURRENT USE OF INSULIN: Primary | ICD-10-CM

## 2022-11-08 PROCEDURE — 99999 PR PBB SHADOW E&M-EST. PATIENT-LVL III: ICD-10-PCS | Mod: PBBFAC,,, | Performed by: DIETITIAN, REGISTERED

## 2022-11-08 PROCEDURE — G0108 DIAB MANAGE TRN  PER INDIV: HCPCS | Mod: S$GLB,,, | Performed by: DIETITIAN, REGISTERED

## 2022-11-08 PROCEDURE — 99999 PR PBB SHADOW E&M-EST. PATIENT-LVL III: CPT | Mod: PBBFAC,,, | Performed by: DIETITIAN, REGISTERED

## 2022-11-08 PROCEDURE — G0108 PR DIAB MANAGE TRN  PER INDIV: ICD-10-PCS | Mod: S$GLB,,, | Performed by: DIETITIAN, REGISTERED

## 2022-11-08 NOTE — PROGRESS NOTES
Diabetes Care Specialist Progress Note  Author: Conchita Garcia RD, CDE  Date: 11/8/2022    Program Intake  Reason for Diabetes Program Visit:: Intervention  Type of Intervention:: Individual  Education: Self-Management Skill Review  Current diabetes risk level:: low    Lab Results   Component Value Date    HGBA1C 5.3 09/13/2022       Clinical    Problem Review  Active comorbidities affecting diabetes self-care.:  (HTN, HLD, CAD, CHF, Stroke, Macular degeneration left eye, ESRD, Anemia, VitD defn, Neuropathy)    Clinical Assessment  Current Diabetes Treatment:  (Lantus 10units daily)  Have you ever experienced hypoglycemia (low blood sugar)?: no (none recent)  Have you ever experienced hyperglycemia (high blood sugar)?: no    Medication Information  How many days a week do you miss your medications?: Never  Do you sometimes have difficulty refilling your medications?: No  Medication adherence impacting ability to self-manage diabetes?: No    Labs  Type of Regular Lab Work: A1c, Cholesterol, CBC, BMP  Where do you get your labs drawn?: [facility]  Lab Compliance Barriers: No    Nutritional Status  Diet:  (Pt reports irregular meals ~2 daily with hs snack. Recent higher fat, sodium from processed meat. Pt reports limiting carb portions w/ occs desserts from social events. Inadequate protein intake. Pt dislikes oral supplement, MR shake.)  Meal Plan 24 Hour Recall - Breakfast: oatmeal OR grits, eggs OR froot loops 1c, milk 2%  Meal Plan 24 Hour Recall - Lunch: 3pm pickled pig tails w/ greens, rice 1/2c, sweet potato pie (sent by friend from party)  Meal Plan 24 Hour Recall - Dinner: hs unsalted crackers 6 with water; zahra: sprite zero, water  Recent Changes in Weight: Weight Gain  Was weight loss intentional or unintentional?: Unintentional (~4lbs past week - pt reports recent higher salt meal)  Current nutritional status an area of need that is impacting patient's ability to self-manage diabetes?: Yes    Diabetes  Self-Management Skills Assessment    Nutrition/Healthy Eating  Challenges to healthy eating::  (irregular meal patterns, inadequate protein intake, occs desserts & sugary cereals)  Method of carbohydrate measurement:: eyeballing/guessing  Patient can identify foods that impact blood sugar.: yes  Patient-identified foods:: starchy vegetables (corn, peas, beans), starches (bread, pasta, rice, cereal), sweets, soda, fruit/fruit juice, milk  Nutrition/Healthy Eating Skills Assessment Completed:: Yes  Assessment indicates:: Instruction Needed  Area of need?: Yes    Physical Activity/Exercise  Patient's daily activity level:: sedentary (Pt reports limited activity due recent illnesses)  Patient can identify reasons why exercise/physical activity is important in diabetes management.: no  Physical Activity/Exercise Skills Assessment Completed: : Yes  Assessment indicates:: Instruction Needed  Area of need?: Yes    Medications  Patient is able to describe current diabetes management routine.: yes  Diabetes management routine:: diet, insulin  Patient is able to identify current diabetes medications, dosages, and appropriate timing of medications.: yes  Patient understands the purpose of the medications taken for diabetes.: yes  Patient reports problems or concerns with current medication regimen.: yes  Medication regimen problems/concerns:: does not feel like regimen is working (higher pm BG patterns)  Medication Skills Assessment Completed:: Yes  Assessment indicates:: Adequate understanding  Area of need?: Yes    Home Blood Glucose Monitoring  Patient states that blood sugar is checked at home daily.: yes  Monitoring Method:: home glucometer  When you check what is your typical blood sugar range? : Per review glucometer, fst BG , 153; ac 124, most 232-267.  Blood glucose logs:: yes, encouraged to bring logs to provider visits  Home Blood Glucose Monitoring Skills Assessment Completed: : Yes  Assessment indicates::  Adequate understanding  Area of need?: No    Assessment Summary and Plan  Based on today's diabetes care assessment, the following areas of need were identified:      Social 9/6/2022   Support No   Access to Mass Media/Tech No   Cognitive/Behavioral Health No   Culture/Congregation No   Communication No   Health Literacy No      Clinical 11/8/2022   Medication Adherence No   Lab Compliance No   Nutritional Status Yes - see care plan      Diabetes Self-Management Skills 11/8/2022   Nutrition/Healthy Eating Yes - see care plan   Physical Activity/Exercise Yes - pt's dtr reports plans for cardiac rehab and will fu w/ cardio provider   Medication Yes - see care plan   Home Blood Glucose Monitoring No      Today's interventions were provided through individual discussion, instruction, and written materials were provided.    Patient verbalized understanding of instruction and written materials.  Pt was able to return back demonstration of instructions today. Patient understood key points, needs reinforcement and further instruction.     Diabetes Self-Management Care Plan:  Today's Diabetes Self-Management Care Plan was developed with Avril's input. Avril has agreed to work toward the following goal(s) to improve his/her overall diabetes control.      Care Plan: Diabetes Management   Updates made since 10/9/2022 12:00 AM        Problem: Healthy Eating         Goal: Eat 3 meals, 2 snacks daily - manage carb 30-45grams/meal, 5-15grams/snack.    Start Date: 9/6/2022   Expected End Date: 9/6/2023   This Visit's Progress: On track   Priority: High   Barriers: No Barriers Identified   Note:    Encouraged progress of portion control management. Discussed role meal spacing, adequate protein intake and balancing carb/pro; provided review of strategies & examples to improve. Pt verbalizes understanding and willingness to continue work towards goal.        Problem: Medications         Goal: Patient Agrees to take Diabetes Medication(s)  as prescribed. Completed 11/8/2022   Start Date: 9/6/2022   Expected End Date: 9/6/2023   This Visit's Progress: Met   Priority: Medium   Barriers: No Barriers Identified   Note:    Encouraged progress and fu with provider Harshil for medical mgmt.         Follow Up Plan   Follow up in about 3 months (around 2/8/2023).  -Review BG logs/meter   -Review A1C update  -Eval goal progress  -Admin diabetes distress scale    Today's care plan and follow up schedule was discussed with patient.  Avril verbalized understanding of the care plan, goals, and agrees to follow up plan.        The patient was encouraged to communicate with his/her health care provider/physician and care team regarding his/her condition(s) and treatment.  I provided the patient with my contact information today and encouraged to contact me via phone or [facility] Portal as needed.     Length of Visit   Total Time: 30 Minutes

## 2022-11-09 DIAGNOSIS — N18.6 ESRD ON DIALYSIS: Primary | ICD-10-CM

## 2022-11-09 DIAGNOSIS — Z99.2 ESRD ON DIALYSIS: Primary | ICD-10-CM

## 2022-11-14 PROBLEM — I21.4 NSTEMI (NON-ST ELEVATED MYOCARDIAL INFARCTION): Status: RESOLVED | Noted: 2022-07-12 | Resolved: 2022-11-14

## 2022-12-07 ENCOUNTER — OFFICE VISIT (OUTPATIENT)
Dept: DIABETES | Facility: CLINIC | Age: 77
End: 2022-12-07
Payer: MEDICARE

## 2022-12-07 DIAGNOSIS — Z53.21 PATIENT LEFT WITHOUT BEING SEEN: Primary | ICD-10-CM

## 2022-12-07 PROCEDURE — 1157F PR ADVANCE CARE PLAN OR EQUIV PRESENT IN MEDICAL RECORD: ICD-10-PCS | Mod: CPTII,95,, | Performed by: NURSE PRACTITIONER

## 2022-12-07 PROCEDURE — 1159F PR MEDICATION LIST DOCUMENTED IN MEDICAL RECORD: ICD-10-PCS | Mod: CPTII,95,, | Performed by: NURSE PRACTITIONER

## 2022-12-07 PROCEDURE — 3044F HG A1C LEVEL LT 7.0%: CPT | Mod: CPTII,95,, | Performed by: NURSE PRACTITIONER

## 2022-12-07 PROCEDURE — 3066F PR DOCUMENTATION OF TREATMENT FOR NEPHROPATHY: ICD-10-PCS | Mod: CPTII,95,, | Performed by: NURSE PRACTITIONER

## 2022-12-07 PROCEDURE — 1160F PR REVIEW ALL MEDS BY PRESCRIBER/CLIN PHARMACIST DOCUMENTED: ICD-10-PCS | Mod: CPTII,95,, | Performed by: NURSE PRACTITIONER

## 2022-12-07 PROCEDURE — 1159F MED LIST DOCD IN RCRD: CPT | Mod: CPTII,95,, | Performed by: NURSE PRACTITIONER

## 2022-12-07 PROCEDURE — 1160F RVW MEDS BY RX/DR IN RCRD: CPT | Mod: CPTII,95,, | Performed by: NURSE PRACTITIONER

## 2022-12-07 PROCEDURE — 1157F ADVNC CARE PLAN IN RCRD: CPT | Mod: CPTII,95,, | Performed by: NURSE PRACTITIONER

## 2022-12-07 PROCEDURE — 4010F ACE/ARB THERAPY RXD/TAKEN: CPT | Mod: CPTII,95,, | Performed by: NURSE PRACTITIONER

## 2022-12-07 PROCEDURE — 99499 UNLISTED E&M SERVICE: CPT | Mod: 95,,, | Performed by: NURSE PRACTITIONER

## 2022-12-07 PROCEDURE — 4010F PR ACE/ARB THEARPY RXD/TAKEN: ICD-10-PCS | Mod: CPTII,95,, | Performed by: NURSE PRACTITIONER

## 2022-12-07 PROCEDURE — 3044F PR MOST RECENT HEMOGLOBIN A1C LEVEL <7.0%: ICD-10-PCS | Mod: CPTII,95,, | Performed by: NURSE PRACTITIONER

## 2022-12-07 PROCEDURE — 99499 NO LOS: ICD-10-PCS | Mod: 95,,, | Performed by: NURSE PRACTITIONER

## 2022-12-07 PROCEDURE — 3066F NEPHROPATHY DOC TX: CPT | Mod: CPTII,95,, | Performed by: NURSE PRACTITIONER

## 2022-12-07 NOTE — PROGRESS NOTES
Scheduled telehealth visit not completed. Pt in dialysis. Rescheduled for another visit. No LOS.

## 2022-12-15 ENCOUNTER — OFFICE VISIT (OUTPATIENT)
Dept: DIABETES | Facility: CLINIC | Age: 77
End: 2022-12-15
Payer: MEDICARE

## 2022-12-15 DIAGNOSIS — Z53.21 PATIENT LEFT WITHOUT BEING SEEN: Primary | ICD-10-CM

## 2022-12-15 PROCEDURE — 4010F PR ACE/ARB THEARPY RXD/TAKEN: ICD-10-PCS | Mod: CPTII,95,, | Performed by: NURSE PRACTITIONER

## 2022-12-15 PROCEDURE — 3066F NEPHROPATHY DOC TX: CPT | Mod: CPTII,95,, | Performed by: NURSE PRACTITIONER

## 2022-12-15 PROCEDURE — 3044F HG A1C LEVEL LT 7.0%: CPT | Mod: CPTII,95,, | Performed by: NURSE PRACTITIONER

## 2022-12-15 PROCEDURE — 1157F ADVNC CARE PLAN IN RCRD: CPT | Mod: CPTII,95,, | Performed by: NURSE PRACTITIONER

## 2022-12-15 PROCEDURE — 99499 NO LOS: ICD-10-PCS | Mod: 95,,, | Performed by: NURSE PRACTITIONER

## 2022-12-15 PROCEDURE — 1157F PR ADVANCE CARE PLAN OR EQUIV PRESENT IN MEDICAL RECORD: ICD-10-PCS | Mod: CPTII,95,, | Performed by: NURSE PRACTITIONER

## 2022-12-15 PROCEDURE — 1160F RVW MEDS BY RX/DR IN RCRD: CPT | Mod: CPTII,95,, | Performed by: NURSE PRACTITIONER

## 2022-12-15 PROCEDURE — 4010F ACE/ARB THERAPY RXD/TAKEN: CPT | Mod: CPTII,95,, | Performed by: NURSE PRACTITIONER

## 2022-12-15 PROCEDURE — 1160F PR REVIEW ALL MEDS BY PRESCRIBER/CLIN PHARMACIST DOCUMENTED: ICD-10-PCS | Mod: CPTII,95,, | Performed by: NURSE PRACTITIONER

## 2022-12-15 PROCEDURE — 3066F PR DOCUMENTATION OF TREATMENT FOR NEPHROPATHY: ICD-10-PCS | Mod: CPTII,95,, | Performed by: NURSE PRACTITIONER

## 2022-12-15 PROCEDURE — 99499 UNLISTED E&M SERVICE: CPT | Mod: 95,,, | Performed by: NURSE PRACTITIONER

## 2022-12-15 PROCEDURE — 3044F PR MOST RECENT HEMOGLOBIN A1C LEVEL <7.0%: ICD-10-PCS | Mod: CPTII,95,, | Performed by: NURSE PRACTITIONER

## 2022-12-15 PROCEDURE — 1159F MED LIST DOCD IN RCRD: CPT | Mod: CPTII,95,, | Performed by: NURSE PRACTITIONER

## 2022-12-15 PROCEDURE — 1159F PR MEDICATION LIST DOCUMENTED IN MEDICAL RECORD: ICD-10-PCS | Mod: CPTII,95,, | Performed by: NURSE PRACTITIONER

## 2022-12-16 ENCOUNTER — TELEPHONE (OUTPATIENT)
Dept: ADMINISTRATIVE | Facility: HOSPITAL | Age: 77
End: 2022-12-16
Payer: MEDICARE

## 2023-01-03 ENCOUNTER — OFFICE VISIT (OUTPATIENT)
Dept: DERMATOLOGY | Facility: CLINIC | Age: 78
End: 2023-01-03
Payer: MEDICARE

## 2023-01-03 DIAGNOSIS — L29.9 SCALP PRURITUS: ICD-10-CM

## 2023-01-03 DIAGNOSIS — L65.9 ALOPECIA: Primary | ICD-10-CM

## 2023-01-03 PROCEDURE — 99999 PR PBB SHADOW E&M-EST. PATIENT-LVL III: ICD-10-PCS | Mod: PBBFAC,HCNC,, | Performed by: STUDENT IN AN ORGANIZED HEALTH CARE EDUCATION/TRAINING PROGRAM

## 2023-01-03 PROCEDURE — 1101F PR PT FALLS ASSESS DOC 0-1 FALLS W/OUT INJ PAST YR: ICD-10-PCS | Mod: HCNC,CPTII,S$GLB, | Performed by: STUDENT IN AN ORGANIZED HEALTH CARE EDUCATION/TRAINING PROGRAM

## 2023-01-03 PROCEDURE — 1159F MED LIST DOCD IN RCRD: CPT | Mod: HCNC,CPTII,S$GLB, | Performed by: STUDENT IN AN ORGANIZED HEALTH CARE EDUCATION/TRAINING PROGRAM

## 2023-01-03 PROCEDURE — 99204 PR OFFICE/OUTPT VISIT, NEW, LEVL IV, 45-59 MIN: ICD-10-PCS | Mod: HCNC,S$GLB,, | Performed by: STUDENT IN AN ORGANIZED HEALTH CARE EDUCATION/TRAINING PROGRAM

## 2023-01-03 PROCEDURE — 99204 OFFICE O/P NEW MOD 45 MIN: CPT | Mod: HCNC,S$GLB,, | Performed by: STUDENT IN AN ORGANIZED HEALTH CARE EDUCATION/TRAINING PROGRAM

## 2023-01-03 PROCEDURE — 3288F PR FALLS RISK ASSESSMENT DOCUMENTED: ICD-10-PCS | Mod: HCNC,CPTII,S$GLB, | Performed by: STUDENT IN AN ORGANIZED HEALTH CARE EDUCATION/TRAINING PROGRAM

## 2023-01-03 PROCEDURE — 1157F PR ADVANCE CARE PLAN OR EQUIV PRESENT IN MEDICAL RECORD: ICD-10-PCS | Mod: HCNC,CPTII,S$GLB, | Performed by: STUDENT IN AN ORGANIZED HEALTH CARE EDUCATION/TRAINING PROGRAM

## 2023-01-03 PROCEDURE — 1157F ADVNC CARE PLAN IN RCRD: CPT | Mod: HCNC,CPTII,S$GLB, | Performed by: STUDENT IN AN ORGANIZED HEALTH CARE EDUCATION/TRAINING PROGRAM

## 2023-01-03 PROCEDURE — 3288F FALL RISK ASSESSMENT DOCD: CPT | Mod: HCNC,CPTII,S$GLB, | Performed by: STUDENT IN AN ORGANIZED HEALTH CARE EDUCATION/TRAINING PROGRAM

## 2023-01-03 PROCEDURE — 1101F PT FALLS ASSESS-DOCD LE1/YR: CPT | Mod: HCNC,CPTII,S$GLB, | Performed by: STUDENT IN AN ORGANIZED HEALTH CARE EDUCATION/TRAINING PROGRAM

## 2023-01-03 PROCEDURE — 99999 PR PBB SHADOW E&M-EST. PATIENT-LVL III: CPT | Mod: PBBFAC,HCNC,, | Performed by: STUDENT IN AN ORGANIZED HEALTH CARE EDUCATION/TRAINING PROGRAM

## 2023-01-03 PROCEDURE — 1160F RVW MEDS BY RX/DR IN RCRD: CPT | Mod: HCNC,CPTII,S$GLB, | Performed by: STUDENT IN AN ORGANIZED HEALTH CARE EDUCATION/TRAINING PROGRAM

## 2023-01-03 PROCEDURE — 1159F PR MEDICATION LIST DOCUMENTED IN MEDICAL RECORD: ICD-10-PCS | Mod: HCNC,CPTII,S$GLB, | Performed by: STUDENT IN AN ORGANIZED HEALTH CARE EDUCATION/TRAINING PROGRAM

## 2023-01-03 PROCEDURE — 1126F AMNT PAIN NOTED NONE PRSNT: CPT | Mod: HCNC,CPTII,S$GLB, | Performed by: STUDENT IN AN ORGANIZED HEALTH CARE EDUCATION/TRAINING PROGRAM

## 2023-01-03 PROCEDURE — 1160F PR REVIEW ALL MEDS BY PRESCRIBER/CLIN PHARMACIST DOCUMENTED: ICD-10-PCS | Mod: HCNC,CPTII,S$GLB, | Performed by: STUDENT IN AN ORGANIZED HEALTH CARE EDUCATION/TRAINING PROGRAM

## 2023-01-03 PROCEDURE — 1126F PR PAIN SEVERITY QUANTIFIED, NO PAIN PRESENT: ICD-10-PCS | Mod: HCNC,CPTII,S$GLB, | Performed by: STUDENT IN AN ORGANIZED HEALTH CARE EDUCATION/TRAINING PROGRAM

## 2023-01-03 RX ORDER — FLUOCINONIDE TOPICAL SOLUTION USP, 0.05% 0.5 MG/ML
SOLUTION TOPICAL 2 TIMES DAILY
Qty: 60 ML | Refills: 5 | Status: SHIPPED | OUTPATIENT
Start: 2023-01-03 | End: 2023-08-17

## 2023-01-03 RX ORDER — INSULIN GLARGINE 100 [IU]/ML
8 INJECTION, SOLUTION SUBCUTANEOUS NIGHTLY
Qty: 15 ML | Refills: 2 | Status: SHIPPED | OUTPATIENT
Start: 2023-01-03 | End: 2023-01-11 | Stop reason: ALTCHOICE

## 2023-01-03 NOTE — PROGRESS NOTES
Patient Avril Monzon, MRN [id number], was dependent on dialysis (ICD10 Z99.2) at the time of this visit on 1/3/23. This addendum is made to the medical record on 01/03/2023.

## 2023-01-03 NOTE — PROGRESS NOTES
Patient Information  Name: Avril Monzon  : 1945  MRN: [id number]     Referring Physician:  Dr. Hernadez   Primary Care Physician:  Dr. Rose Parks MD   Date of Visit: 2023      Subjective:       Avril Monzon is a 77 y.o. female who presents for   Chief Complaint   Patient presents with    Hair/Scalp Problem    Hair Loss     C/o hair loss, feels things are crawling on scalp      HPI  Patient with new complaint of lesion(s)  Location: scalp  Duration: 3 years  Symptoms: feels like things are crawling on scalp, hair loss  Relieving factors/Previous treatments: OTC shampoo/conditioner    She reports having hx of T2DM and CKD.  Patient was last seen:Visit date not found     Prior notes by myself reviewed.   Clinical documentation obtained by nursing staff reviewed.    Review of Systems   Skin:  Positive for itching. Negative for rash.      Objective:    Physical Exam   Constitutional: She appears well-developed and well-nourished. No distress.   Neurological: She is alert and oriented to person, place, and time. She is not disoriented.   Psychiatric: She has a normal mood and affect.   Skin:   Areas Examined (abnormalities noted in diagram):   Scalp / Hair Palpated and Inspected            Diagram Legend     Erythematous scaling macule/papule c/w actinic keratosis       Vascular papule c/w angioma      Pigmented verrucoid papule/plaque c/w seborrheic keratosis      Yellow umbilicated papule c/w sebaceous hyperplasia      Irregularly shaped tan macule c/w lentigo     1-2 mm smooth white papules consistent with Milia      Movable subcutaneous cyst with punctum c/w epidermal inclusion cyst      Subcutaneous movable cyst c/w pilar cyst      Firm pink to brown papule c/w dermatofibroma      Pedunculated fleshy papule(s) c/w skin tag(s)      Evenly pigmented macule c/w junctional nevus     Mildly variegated pigmented, slightly irregular-bordered macule c/w mildly atypical nevus      Flesh colored to evenly  pigmented papule c/w intradermal nevus       Pink pearly papule/plaque c/w basal cell carcinoma      Erythematous hyperkeratotic cursted plaque c/w SCC      Surgical scar with no sign of skin cancer recurrence      Open and closed comedones      Inflammatory papules and pustules      Verrucoid papule consistent consistent with wart     Erythematous eczematous patches and plaques     Dystrophic onycholytic nail with subungual debris c/w onychomycosis     Umbilicated papule    Erythematous-base heme-crusted tan verrucoid plaque consistent with inflamed seborrheic keratosis     Erythematous Silvery Scaling Plaque c/w Psoriasis     See annotation      No images are attached to the encounter or orders placed in the encounter.    [] Data reviewed  [] Independent review of test  [] Management discussed with another provider    Assessment / Plan:        Alopecia, suspect likely AGA   - recommend topical rogaine    Scalp pruritus likely 2/2 diabetes, CKD  -     fluocinonide (LIDEX) 0.05 % external solution; Apply topically 2 (two) times daily. Use on scalp  Dispense: 60 mL; Refill: 5  - Discussed likely etiology. Recommend improvement with diabetes control. Can consider sarna +/- pramoxine (OTC.             LOS NUMBER AND COMPLEXITY OF PROBLEMS    COMPLEXITY OF DATA RISK TOTAL TIME (m)   54136  [id number] [] 1 self-limited or minor problem [x] Minimal to none [] No treatment recommended or patient to monitor 15-29  10-19   61227  01098 Low  [] 2 or > self limited or minor problems  [] 1 stable chronic illness  [] 1 acute, uncomplicated illness or injury Limited (2)  [] Prior external notes from each unique source  [] Review result of each unique test  [] Order each unique test []  Low  OTC medications, minor skin biopsy 30-44 20-29   65292  [id number] Moderate  [x]  1 or > chronic illness with progression, exacerbation or SE of treatment  []  2 or more stable chronic illnesses  []  1 acute illness with systemic symptoms  []  1 acute  complicated injury  []  1 undiagnosed new problem with uncertain prognosis Moderate (1/3 below)  []  3 or more data items        *Now includes assessment requiring independent historian  []  Independent interpretation of a test  []  Discuss management/test with another provider Moderate  [x]  Prescription drug mgmt  []  Minor surgery with risk discussed  []  Mgmt limited by social determinates 45-59  30-39   58834  83820 High  []  1 or more chronic illness with severe exacerbation, progression or SE of treatment  []  1 acute or chronic illness/injury that poses a threat to life or bodily function Extensive (2/3 below)  []  3 or more data items        *Now includes assessment requiring independent historian.  []  Independent interpretation of a test  []  Discuss management/test with another provider High  []  Major surgery with risk discussed  []  Drug therapy requiring intensive monitoring for toxicity  []  Hospitalization  []  Decision for DNR 60-74  40-54      No follow-ups on file.    Mariah Brewster MD, FAAD  [facility]

## 2023-01-06 RX ORDER — SEVELAMER CARBONATE 800 MG/1
1600 TABLET, FILM COATED ORAL
Qty: 180 TABLET | Refills: 11 | Status: SHIPPED | OUTPATIENT
Start: 2023-01-06 | End: 2023-02-05

## 2023-01-10 ENCOUNTER — HOSPITAL ENCOUNTER (OUTPATIENT)
Dept: CARDIOLOGY | Facility: HOSPITAL | Age: 78
Discharge: HOME | End: 2023-01-10
Attending: INTERNAL MEDICINE
Payer: MEDICARE

## 2023-01-10 DIAGNOSIS — L97.512 SKIN ULCER OF TOE OF RIGHT FOOT WITH FAT LAYER EXPOSED: ICD-10-CM

## 2023-01-10 PROCEDURE — 93922 UPR/L XTREMITY ART 2 LEVELS: CPT | Mod: HCNC

## 2023-01-10 PROCEDURE — 93922 ANKLE BRACHIAL INDICES (ABI): ICD-10-PCS | Mod: 26,HCNC,, | Performed by: INTERNAL MEDICINE

## 2023-01-10 PROCEDURE — 93922 UPR/L XTREMITY ART 2 LEVELS: CPT | Mod: 26,HCNC,, | Performed by: INTERNAL MEDICINE

## 2023-01-11 ENCOUNTER — OFFICE VISIT (OUTPATIENT)
Dept: DIABETES | Facility: CLINIC | Age: 78
End: 2023-01-11
Payer: MEDICARE

## 2023-01-11 VITALS
HEART RATE: 66 BPM | HEIGHT: 64 IN | SYSTOLIC BLOOD PRESSURE: 122 MMHG | BODY MASS INDEX: 20.67 KG/M2 | DIASTOLIC BLOOD PRESSURE: 60 MMHG | WEIGHT: 121.06 LBS

## 2023-01-11 DIAGNOSIS — E11.69 HYPERLIPIDEMIA ASSOCIATED WITH TYPE 2 DIABETES MELLITUS: ICD-10-CM

## 2023-01-11 DIAGNOSIS — E78.5 HYPERLIPIDEMIA ASSOCIATED WITH TYPE 2 DIABETES MELLITUS: ICD-10-CM

## 2023-01-11 DIAGNOSIS — E11.59 HYPERTENSION ASSOCIATED WITH DIABETES: ICD-10-CM

## 2023-01-11 DIAGNOSIS — Z79.4 TYPE 2 DIABETES MELLITUS WITH STABLE PROLIFERATIVE RETINOPATHY OF BOTH EYES, WITH LONG-TERM CURRENT USE OF INSULIN: Primary | ICD-10-CM

## 2023-01-11 DIAGNOSIS — I15.2 HYPERTENSION ASSOCIATED WITH DIABETES: ICD-10-CM

## 2023-01-11 DIAGNOSIS — E11.3553 TYPE 2 DIABETES MELLITUS WITH STABLE PROLIFERATIVE RETINOPATHY OF BOTH EYES, WITH LONG-TERM CURRENT USE OF INSULIN: Primary | ICD-10-CM

## 2023-01-11 LAB
GLUCOSE SERPL-MCNC: 307 MG/DL (ref 70–110)
LEFT ABI: 1.88
LEFT DORSALIS PEDIS: 255 MMHG
LEFT POSTERIOR TIBIAL: 255 MMHG
LEFT TBI: 0.76
LEFT TOE PRESSURE: 103 MMHG
RIGHT ABI: 1.88
RIGHT ARM BP: 136 MMHG
RIGHT DORSALIS PEDIS: 255 MMHG
RIGHT POSTERIOR TIBIAL: 255 MMHG
RIGHT TBI: 0.67
RIGHT TOE PRESSURE: 91 MMHG

## 2023-01-11 PROCEDURE — 1101F PT FALLS ASSESS-DOCD LE1/YR: CPT | Mod: HCNC,CPTII,S$GLB, | Performed by: NURSE PRACTITIONER

## 2023-01-11 PROCEDURE — 3074F PR MOST RECENT SYSTOLIC BLOOD PRESSURE < 130 MM HG: ICD-10-PCS | Mod: HCNC,CPTII,S$GLB, | Performed by: NURSE PRACTITIONER

## 2023-01-11 PROCEDURE — 1160F RVW MEDS BY RX/DR IN RCRD: CPT | Mod: HCNC,CPTII,S$GLB, | Performed by: NURSE PRACTITIONER

## 2023-01-11 PROCEDURE — 3288F FALL RISK ASSESSMENT DOCD: CPT | Mod: HCNC,CPTII,S$GLB, | Performed by: NURSE PRACTITIONER

## 2023-01-11 PROCEDURE — 82962 GLUCOSE BLOOD TEST: CPT | Mod: HCNC,S$GLB,, | Performed by: NURSE PRACTITIONER

## 2023-01-11 PROCEDURE — 1101F PR PT FALLS ASSESS DOC 0-1 FALLS W/OUT INJ PAST YR: ICD-10-PCS | Mod: HCNC,CPTII,S$GLB, | Performed by: NURSE PRACTITIONER

## 2023-01-11 PROCEDURE — 99214 PR OFFICE/OUTPT VISIT, EST, LEVL IV, 30-39 MIN: ICD-10-PCS | Mod: HCNC,S$GLB,, | Performed by: NURSE PRACTITIONER

## 2023-01-11 PROCEDURE — 3074F SYST BP LT 130 MM HG: CPT | Mod: HCNC,CPTII,S$GLB, | Performed by: NURSE PRACTITIONER

## 2023-01-11 PROCEDURE — 1160F PR REVIEW ALL MEDS BY PRESCRIBER/CLIN PHARMACIST DOCUMENTED: ICD-10-PCS | Mod: HCNC,CPTII,S$GLB, | Performed by: NURSE PRACTITIONER

## 2023-01-11 PROCEDURE — 1126F AMNT PAIN NOTED NONE PRSNT: CPT | Mod: HCNC,CPTII,S$GLB, | Performed by: NURSE PRACTITIONER

## 2023-01-11 PROCEDURE — 1159F PR MEDICATION LIST DOCUMENTED IN MEDICAL RECORD: ICD-10-PCS | Mod: HCNC,CPTII,S$GLB, | Performed by: NURSE PRACTITIONER

## 2023-01-11 PROCEDURE — 3078F PR MOST RECENT DIASTOLIC BLOOD PRESSURE < 80 MM HG: ICD-10-PCS | Mod: HCNC,CPTII,S$GLB, | Performed by: NURSE PRACTITIONER

## 2023-01-11 PROCEDURE — 1126F PR PAIN SEVERITY QUANTIFIED, NO PAIN PRESENT: ICD-10-PCS | Mod: HCNC,CPTII,S$GLB, | Performed by: NURSE PRACTITIONER

## 2023-01-11 PROCEDURE — 1157F PR ADVANCE CARE PLAN OR EQUIV PRESENT IN MEDICAL RECORD: ICD-10-PCS | Mod: HCNC,CPTII,S$GLB, | Performed by: NURSE PRACTITIONER

## 2023-01-11 PROCEDURE — 3078F DIAST BP <80 MM HG: CPT | Mod: HCNC,CPTII,S$GLB, | Performed by: NURSE PRACTITIONER

## 2023-01-11 PROCEDURE — 99999 PR PBB SHADOW E&M-EST. PATIENT-LVL IV: ICD-10-PCS | Mod: PBBFAC,HCNC,, | Performed by: NURSE PRACTITIONER

## 2023-01-11 PROCEDURE — 99214 OFFICE O/P EST MOD 30 MIN: CPT | Mod: HCNC,S$GLB,, | Performed by: NURSE PRACTITIONER

## 2023-01-11 PROCEDURE — 3288F PR FALLS RISK ASSESSMENT DOCUMENTED: ICD-10-PCS | Mod: HCNC,CPTII,S$GLB, | Performed by: NURSE PRACTITIONER

## 2023-01-11 PROCEDURE — 1159F MED LIST DOCD IN RCRD: CPT | Mod: HCNC,CPTII,S$GLB, | Performed by: NURSE PRACTITIONER

## 2023-01-11 PROCEDURE — 99999 PR PBB SHADOW E&M-EST. PATIENT-LVL IV: CPT | Mod: PBBFAC,HCNC,, | Performed by: NURSE PRACTITIONER

## 2023-01-11 PROCEDURE — 82962 POCT GLUCOSE, HAND-HELD DEVICE: ICD-10-PCS | Mod: HCNC,S$GLB,, | Performed by: NURSE PRACTITIONER

## 2023-01-11 PROCEDURE — 1157F ADVNC CARE PLAN IN RCRD: CPT | Mod: HCNC,CPTII,S$GLB, | Performed by: NURSE PRACTITIONER

## 2023-01-11 RX ORDER — PEN NEEDLE, DIABETIC 30 GX3/16"
1 NEEDLE, DISPOSABLE MISCELLANEOUS 2 TIMES DAILY
Qty: 100 EACH | Refills: 11 | Status: SHIPPED | OUTPATIENT
Start: 2023-01-11 | End: 2023-11-13 | Stop reason: CLARIF

## 2023-01-11 RX ORDER — INSULIN HUMAN 100 [IU]/ML
INJECTION, SUSPENSION SUBCUTANEOUS
Qty: 15 ML | Refills: 3 | Status: SHIPPED | OUTPATIENT
Start: 2023-01-11 | End: 2023-01-24 | Stop reason: SDUPTHER

## 2023-01-11 NOTE — PROGRESS NOTES
PCP: Rose Parks MD    Subjective:     Chief Complaint: Diabetes follow up.  Last visit with me: 10/25/2022    HPI: 77 y.o.  female for diabetes follow up.   Previously managed by JACK Pierce.   Last clinic visit 2016.  Type II diabetes since age 44.  Comorbidities: HTN, HLD, CHF, ESRD, CVA, MI, Dialysis Use - Sched MWF 10-3 PM, Legally blind.  s/p CABG 2022  ESRD on dialysis x 2 years  Spoke with daughter Elizabeth on the phone as she is the primary caregiver during the visit.    Family History of Type 1/2 DM: mother  Known diabetes complications:  DKA/HHS: no  Retinopathy: yes  Peripheral neuropathy: yes  Nephropathy: yes    Interval history:  Since last visit, according to daughter, patient's appetite has increased.  BG readings have been elevated.    Denies any recent hospital admission or ER visit.  Denies any hypoglycemia.  Endorses diabetes related symptoms: Tingling in Lower Extremities and Both fingers    Blood glucose monitoring: via fingerstick  Per review of patient's meter, BG readings range mid 200's to 300's    Activity level: Sedentary  Meal Plannin meals/day; infrequent snacks/day.    Medication compliance all of the time, diet compliance most of the time.   To be enrolled in diabetes education classes.     Previous treatment: Novolin 70/30, Ozempic    Past failed treatment:  none    Current DM Medications:  Diabetes Medications               insulin glargine (LANTUS) 100 unit/mL injection Inject 8 Units into the skin every evening.  Taking 10 units       Allergies  Review of patient's allergies indicates:   Allergen Reactions    Codeine Swelling    Darvon [propoxyphene] Swelling    Atorvastatin      Muscle twitching       Labs Reviewed:  Her most recent A1C is:  Lab Results   Component Value Date    HGBA1C 5.3 2022    HGBA1C 9.3 (H) 2022    HGBA1C 10.8 (H) 2022       Her most recent BMP is:  Lab Results   Component Value Date     (L)  2022    K 4.2 2022    CL 99 2022    CO2 23 2022    BUN 36 (H) 2022    CREATININE 5.0 (H) 2022    CALCIUM 9.2 2022    ANIONGAP 12 2022    ESTGFRAFRICA 8 (A) 2022    ESTGFRAFRICA 8 (A) 2022    EGFRNONAA 7 (A) 2022    EGFRNONAA 7 (A) 2022       No results found for: CPEPTIDE  No results found for: GLUTAMICACID    Body mass index is 20.78 kg/m².    Wt Readings from Last 3 Encounters:   23 1621 54.9 kg (121 lb 0.5 oz)   22 1437 53.3 kg (117 lb 8.1 oz)   22 1417 51.3 kg (113 lb 1.5 oz)        Her blood sugar in clinic today is:  Lab Results   Component Value Date    POCGLU 307 (A) 2023       Diabetes Management Status  Statin: Taking  ACE/ARB: Taking    Screening or Prevention Patient's value Goal Complete/Controlled?   HgA1C Testing and Control   Lab Results   Component Value Date    HGBA1C 5.3 2022      Annually/Less than 8% Yes     Lipid profile : 2022 Annually Yes     LDL control Lab Results   Component Value Date    LDLCALC 102.8 2022    Annually/Less than 100 mg/dl  No     Nephropathy screening Lab Results   Component Value Date    MICALBCREAT 1244.0 (H) 2018     Lab Results   Component Value Date    PROTEINUA 3+ (A) 2022    Annually Yes     Blood pressure BP Readings from Last 1 Encounters:   23 122/60    Less than 140/90 Yes     Dilated retinal exam : 2022 Annually Yes    Foot exam   : 2022 Annually Yes       Social History     Socioeconomic History    Marital status:    Tobacco Use    Smoking status: Former     Packs/day: 1.50     Years: 30.00     Pack years: 45.00     Types: Cigarettes     Quit date: 1990     Years since quittin.0    Smokeless tobacco: Former   Substance and Sexual Activity    Alcohol use: No     Alcohol/week: 0.0 standard drinks    Drug use: No    Sexual activity: Not Currently     Past Medical History:   Diagnosis Date    Acute  hypoxemic respiratory failure 11/26/2018    Anemia     Arthritis     back, hand, knees    Back pain     Cataract     CKD stage G4/A3, GFR 15 - 29 and albumin creatinine ratio >300 mg/g     GFR 40% Jan 2014 and 33% ub 3/2014 ([facility])    Coronary artery disease involving native coronary artery of native heart 11/30/2018    Diabetic retinopathy     DM (diabetes mellitus) type II controlled, neurological manifestation     Exudative age-related macular degeneration of left eye with active choroidal neovascularization 2/5/2019    GERD (gastroesophageal reflux disease)     Glaucoma     Heart failure     Hyperlipidemia     Hypertension     Non-ST elevation MI (NSTEMI) 11/28/2018    NSTEMI (non-ST elevated myocardial infarction) 11/27/2018    Peripheral neuropathy     Polyneuropathy     Proteinuria     >6 mo     Seizures     [facility] 1/2014 -dick CT [place] showed small vessel    Skin ulcer of toe of right foot with fat layer exposed 10/14/2022    Stroke 2012,2014    Tobacco dependence     Type 2 diabetes with peripheral circulatory disorder, controlled      Review of Systems   Constitutional: Negative for activity change, appetite change, fatigue and unexpected weight change.   HENT: Negative for dental problem and trouble swallowing.    Eyes: Negative for visual disturbance. Legally blind.  Respiratory: Negative for chest tightness and shortness of breath.    Cardiovascular: Negative for chest pain, palpitations and leg swelling.   Gastrointestinal: Negative for abdominal pain, constipation, diarrhea, nausea and vomiting.   Endocrine: Negative for polydipsia, polyphagia and polyuria.   Genitourinary: Negative for difficulty urinating, dysuria, frequency and urgency.        Negative yeast infection   Musculoskeletal: Negative for gait problem, myalgias and neck pain.   Integumentary:  Negative for rash and wound.   Allergic/Immunologic: Negative.    Neurological: Positive for numbness (intermittent both feet). Negative for dizziness,  syncope, weakness and headaches.        Int tingling BLE, hands   Hematological: Negative.    Psychiatric/Behavioral: Negative for confusion and sleep disturbance.   All other systems reviewed and are negative.     Objective:      Physical Exam  Vitals reviewed.   Constitutional:       Appearance: Normal appearance.   HENT:      Head: Normocephalic and atraumatic.   Eyes:      General: Vision grossly intact.      Extraocular Movements: Extraocular movements intact.      Pupils: Pupils are equal, round, and reactive to light.   Neck:      Thyroid: No thyroid mass or thyroid tenderness.   Cardiovascular:      Rate and Rhythm: Normal rate and regular rhythm.      Pulses: Normal pulses.           Radial pulses are 2+ on the right side and 2+ on the left side.        Dorsalis pedis pulses are 2+ on the right side and 2+ on the left side.      Heart sounds: Normal heart sounds.      Arteriovenous access: left arteriovenous access is present.     Comments: Left upper arm AVF + thrill, + bruit  Pulmonary:      Effort: Pulmonary effort is normal.      Breath sounds: Normal breath sounds.   Abdominal:      General: Bowel sounds are normal.      Palpations: Abdomen is soft.   Musculoskeletal:         General: Normal range of motion.      Cervical back: Normal range of motion and neck supple.      Right lower leg: No edema.      Left lower leg: No edema.      Right foot: No deformity or bunion.      Left foot: No deformity or bunion.   Feet:      Right foot:      Skin integrity: Dry skin present. No ulcer, skin breakdown or callus.      Toenail Condition: Right toenails are abnormally thick.      Left foot:      Skin integrity: Dry skin present. No ulcer, skin breakdown or callus.      Toenail Condition: Left toenails are abnormally thick.   Lymphadenopathy:      Cervical: No cervical adenopathy.   Skin:     General: Skin is warm and dry.      Findings: No rash or wound.      Comments: Negative for acanthosis nigricans.  "Well-healed SQ injection sites.   Neurological:      Mental Status: She is alert and oriented to person, place, and time.      Coordination: Coordination is intact.      Gait: Gait is intact.   Psychiatric:         Attention and Perception: Attention normal.         Mood and Affect: Mood normal.         Speech: Speech normal.         Behavior: Behavior normal. Behavior is cooperative.         Thought Content: Thought content normal.         Cognition and Memory: Cognition normal.     Assessment / Plan:     Avril was seen today for diabetes mellitus.    Diagnoses and all orders for this visit:    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, with long-term current use of insulin  -     POCT Glucose, Hand-Held Device  -     insulin NPH/Reg human (HUMULIN 70/30 U-100 KWIKPEN) 100 unit/mL (70-30) InPn pen; Administer 20 units before breakfast and 8 units before dinner. On dialysis days, 10 units before dinner.  -     pen needle, diabetic 32 gauge x 5/32" Ndle; 1 each by Misc.(Non-Drug; Combo Route) route 2 (two) times a day.  -     Hemoglobin A1C; Future    Hypertension associated with diabetes    Hyperlipidemia associated with type 2 diabetes mellitus    Additional Plan Details:  - Condition: Uncontrolled, most recent A1c of 5.3% was completed 4 months ago, however recent BG readings elevated.  - Monitor blood glucose:  2x daily.Goals reviewed. Maintain BG log. Pt would benefit from CGM use. Ordered [employer] 2 supplies.   - CGM note: Patient is testing 4 times per day. Patient is injecting meal time insulin 2 times daily. Patient requires CGM for blood sugar monitoring due to frequent insulin dose changes. Patient reports hypoglycemia, < 50, hypoglycemia unawareness, severe glucose excursions   - Hypoglycemia protocol: Reviewed and risk discussed.  Notify if BG readings below 80. Protocol printout provided.   - Diet: Limit carbohydrate intake 30-45 grams/meal, 3x daily and 15 grams/snack, " limit 2/day.   - Activity: Recommend at least 150 minutes of exercise in a week.  - BP and LDL: Recommend lifestyle modifications and follow up with PCP for management.   - Medication Regimen:   Stop Lantus  Resume Humulin 70/30   On dialysis days MWF, continue 10 units before dinner  On non- dialysis days, continue 20 units before breakfast and 8 units before dinner    - Medication consideration: Discussed with daughter (over the phone) and patient the need to resume Humulin 70/30 or add mealtime insulin to manage PP hyperglycemia. They would like to resume previous regimen.  - Lab results: A1C discussed.  - Health Maintenance standards addressed today:  A1c to be scheduled.   - Risks of uncontrolled DM explained. Importance of routine foot and eye exams reviewed. Scheduled as appropriate.  -The patient was explained the above plan and given opportunity to ask questions.  She understands, chooses and consents to this plan and accepts all the risks, which include but are not limited to the risks mentioned above.   - Labs ordered as above.  - CDE follow up: Scheduled in 2 weeks.  - Follow up: 3 months in clinic.         Charlotte T. Delacruz, FNP-C [facility]    A total of 30 minutes was spent in face to face time, of which over 50 % was spent in counseling patient on disease process, complications, treatment, and side effects of medications.

## 2023-01-11 NOTE — PATIENT INSTRUCTIONS
Medication Regimen:   Stop Lantus  Resume Humulin 70/30   On dialysis days MWF, continue 10 units before dinner  On non- dialysis days, continue 20 units before breakfast and 8 units before dinner    Patient Instructions:  Carbohydrate Count: 30-45 grams/meal and 15 grams/snack with limit of 2 snacks per day.  Exercise: Goal is 150 minutes or more per week.  Bring meter and blood sugar log to each appointment.     Goals for Blood Sugar:  Fastin-130 mg/dl  2 hours after meal:  mg/dl  Before Bed: 100-150 mg/dl  If Blood sugar is below 70 mg/dl, DO NOT take diabetes medication. Eat first and then recheck blood sugar in 20 minutes.  Foods to eat if blood sugar is below 70 mg/dl  1/2 glass of orange juice   1/2 regular cola can   3-4 hard candies   3-4 small glucose tabs.   Foods to eat if blood sugar is below 50 mg/dl  1 glass of orange juice  1 regular cola can  8 hard candies  8 small glucose tabs.   If you have repeated eating/blood sugar recheck process 2 times and blood sugar still below 70 mg/dl, call 911.

## 2023-01-12 ENCOUNTER — TELEPHONE (OUTPATIENT)
Dept: DIABETES | Facility: CLINIC | Age: 78
End: 2023-01-12
Payer: MEDICARE

## 2023-01-12 NOTE — TELEPHONE ENCOUNTER
Called pharmacy to request they run Humulin RX as brand name so that a PA will not be required. Pharmacy informed me PA is still required for brand name.

## 2023-01-13 ENCOUNTER — TELEPHONE (OUTPATIENT)
Dept: DIABETES | Facility: CLINIC | Age: 78
End: 2023-01-13
Payer: MEDICARE

## 2023-01-17 ENCOUNTER — OFFICE VISIT (OUTPATIENT)
Dept: PRIMARY CARE CLINIC | Facility: CLINIC | Age: 78
End: 2023-01-17
Payer: MEDICARE

## 2023-01-17 VITALS
HEART RATE: 70 BPM | OXYGEN SATURATION: 100 % | SYSTOLIC BLOOD PRESSURE: 142 MMHG | TEMPERATURE: 98 F | HEIGHT: 64 IN | WEIGHT: 124.13 LBS | BODY MASS INDEX: 21.19 KG/M2 | DIASTOLIC BLOOD PRESSURE: 62 MMHG

## 2023-01-17 DIAGNOSIS — J30.9 ALLERGIC RHINITIS, UNSPECIFIED SEASONALITY, UNSPECIFIED TRIGGER: ICD-10-CM

## 2023-01-17 DIAGNOSIS — R09.82 POST-NASAL DRIP: ICD-10-CM

## 2023-01-17 DIAGNOSIS — J01.90 ACUTE NON-RECURRENT SINUSITIS, UNSPECIFIED LOCATION: ICD-10-CM

## 2023-01-17 DIAGNOSIS — J01.90 ACUTE NON-RECURRENT SINUSITIS, UNSPECIFIED LOCATION: Primary | ICD-10-CM

## 2023-01-17 PROCEDURE — 99499 RISK ADDL DX/OHS AUDIT: ICD-10-PCS | Mod: HCNC,S$GLB,, | Performed by: NURSE PRACTITIONER

## 2023-01-17 PROCEDURE — 99214 PR OFFICE/OUTPT VISIT, EST, LEVL IV, 30-39 MIN: ICD-10-PCS | Mod: HCNC,S$GLB,, | Performed by: NURSE PRACTITIONER

## 2023-01-17 PROCEDURE — 3078F PR MOST RECENT DIASTOLIC BLOOD PRESSURE < 80 MM HG: ICD-10-PCS | Mod: HCNC,CPTII,S$GLB, | Performed by: NURSE PRACTITIONER

## 2023-01-17 PROCEDURE — 3078F DIAST BP <80 MM HG: CPT | Mod: HCNC,CPTII,S$GLB, | Performed by: NURSE PRACTITIONER

## 2023-01-17 PROCEDURE — 3077F PR MOST RECENT SYSTOLIC BLOOD PRESSURE >= 140 MM HG: ICD-10-PCS | Mod: HCNC,CPTII,S$GLB, | Performed by: NURSE PRACTITIONER

## 2023-01-17 PROCEDURE — 3288F FALL RISK ASSESSMENT DOCD: CPT | Mod: HCNC,CPTII,S$GLB, | Performed by: NURSE PRACTITIONER

## 2023-01-17 PROCEDURE — 1157F PR ADVANCE CARE PLAN OR EQUIV PRESENT IN MEDICAL RECORD: ICD-10-PCS | Mod: HCNC,CPTII,S$GLB, | Performed by: NURSE PRACTITIONER

## 2023-01-17 PROCEDURE — 99214 OFFICE O/P EST MOD 30 MIN: CPT | Mod: HCNC,S$GLB,, | Performed by: NURSE PRACTITIONER

## 2023-01-17 PROCEDURE — 1126F AMNT PAIN NOTED NONE PRSNT: CPT | Mod: HCNC,CPTII,S$GLB, | Performed by: NURSE PRACTITIONER

## 2023-01-17 PROCEDURE — 1159F PR MEDICATION LIST DOCUMENTED IN MEDICAL RECORD: ICD-10-PCS | Mod: HCNC,CPTII,S$GLB, | Performed by: NURSE PRACTITIONER

## 2023-01-17 PROCEDURE — 1101F PR PT FALLS ASSESS DOC 0-1 FALLS W/OUT INJ PAST YR: ICD-10-PCS | Mod: HCNC,CPTII,S$GLB, | Performed by: NURSE PRACTITIONER

## 2023-01-17 PROCEDURE — 1159F MED LIST DOCD IN RCRD: CPT | Mod: HCNC,CPTII,S$GLB, | Performed by: NURSE PRACTITIONER

## 2023-01-17 PROCEDURE — 1157F ADVNC CARE PLAN IN RCRD: CPT | Mod: HCNC,CPTII,S$GLB, | Performed by: NURSE PRACTITIONER

## 2023-01-17 PROCEDURE — 1101F PT FALLS ASSESS-DOCD LE1/YR: CPT | Mod: HCNC,CPTII,S$GLB, | Performed by: NURSE PRACTITIONER

## 2023-01-17 PROCEDURE — 3077F SYST BP >= 140 MM HG: CPT | Mod: HCNC,CPTII,S$GLB, | Performed by: NURSE PRACTITIONER

## 2023-01-17 PROCEDURE — 99499 UNLISTED E&M SERVICE: CPT | Mod: HCNC,S$GLB,, | Performed by: NURSE PRACTITIONER

## 2023-01-17 PROCEDURE — 3288F PR FALLS RISK ASSESSMENT DOCUMENTED: ICD-10-PCS | Mod: HCNC,CPTII,S$GLB, | Performed by: NURSE PRACTITIONER

## 2023-01-17 PROCEDURE — 1126F PR PAIN SEVERITY QUANTIFIED, NO PAIN PRESENT: ICD-10-PCS | Mod: HCNC,CPTII,S$GLB, | Performed by: NURSE PRACTITIONER

## 2023-01-17 RX ORDER — AMOXICILLIN 500 MG/1
500 TABLET, FILM COATED ORAL EVERY 12 HOURS
Qty: 14 TABLET | Refills: 0 | Status: SHIPPED | OUTPATIENT
Start: 2023-01-17 | End: 2023-01-24

## 2023-01-17 RX ORDER — AZELASTINE 1 MG/ML
1 SPRAY, METERED NASAL 2 TIMES DAILY
Qty: 30 ML | Refills: 0 | Status: ON HOLD | OUTPATIENT
Start: 2023-01-17 | End: 2024-03-16

## 2023-01-17 NOTE — ASSESSMENT & PLAN NOTE
Astelin nasal spray refilled  Given length of time with symptoms and immunocompromised state - amoxicillin x 7 days prescribed  Encouraged nasal saline spray to help with nasal congestion and sinus irrigation.

## 2023-01-17 NOTE — PROGRESS NOTES
"Avril Monzon  01/17/2023  [id number]    Rose Germain MD  Patient Care Team:  Rose Germain MD as PCP - General (Internal Medicine)  Conchita Garcia RD, CDE as Dietitian (Diabetes)  Debbie Wagner RD (Inactive) as Dietitian (Nutrition)  Angela Hanson MD as Consulting Physician (Nephrology)  Demetrio Mejia MD as Consulting Physician (Cardiology)  Conchita Garcia RD, CDE as Diabetes Educator (Diabetes)      [facility]      Chief Complaint:  Sinus problems      History of Present Illness:  Pt has not seen Dr. Germain since July, 2022. Since that time, she had a CABG. Also, has ESRD on Dialysis  Today she presents with reports of prolonged sinus symptoms x 2 months. She reports "runny nose" some that was blood tinged this AM and sneezing. She denies fever, chills, nasal congestion, sinus pressure, sore throat, otalgia, productive cough. She has been taking an OTC "children's medication." But could not remember the name.             Review of Systems   Constitutional:  Negative for chills and fever.   HENT:  Negative for congestion, ear discharge, ear pain, sinus pain and sore throat.    Respiratory:  Negative for cough and sputum production.    Cardiovascular:  Negative for chest pain and palpitations.   All other systems reviewed and are negative.      The following were reviewed: Active problem list, medication list, allergies, family history, social history, and Health Maintenance.     History:  Past Medical History:   Diagnosis Date    Acute hypoxemic respiratory failure 11/26/2018    Anemia     Arthritis     back, hand, knees    Back pain     Cataract     CKD stage G4/A3, GFR 15 - 29 and albumin creatinine ratio >300 mg/g     GFR 40% Jan 2014 and 33% ub 3/2014 ([facility])    Coronary artery disease involving native coronary artery of native heart 11/30/2018    Diabetic retinopathy     DM (diabetes mellitus) type II controlled, neurological manifestation     Exudative age-related macular " degeneration of left eye with active choroidal neovascularization 2/5/2019    GERD (gastroesophageal reflux disease)     Glaucoma     Heart failure     Hyperlipidemia     Hypertension     Non-ST elevation MI (NSTEMI) 11/28/2018    NSTEMI (non-ST elevated myocardial infarction) 11/27/2018    Peripheral neuropathy     Polyneuropathy     Proteinuria     >6 mo     Seizures     BRG 1/2014 -dick CT NRI showed small vessel    Skin ulcer of toe of right foot with fat layer exposed 10/14/2022    Stroke 2012,2014    Tobacco dependence     Type 2 diabetes with peripheral circulatory disorder, controlled      Past Surgical History:   Procedure Laterality Date    BREAST LUMPECTOMY Left     benign, when patient was 15 years old    BREAST MASS EXCISION      ,benign, age 15.    CATHETERIZATION OF BOTH LEFT AND RIGHT HEART N/A 11/28/2018    Procedure: CATHETERIZATION, HEART, BOTH LEFT AND RIGHT;  Surgeon: Michelle Holman MD;  Location: [facility] CATH LAB;  Service: Cardiology;  Laterality: N/A;    CATHETERIZATION OF BOTH LEFT AND RIGHT HEART N/A 11/30/2018    Procedure: CATHETERIZATION, HEART, BOTH LEFT AND RIGHT;  Surgeon: Michelle Holman MD;  Location: [facility] CATH LAB;  Service: Cardiology;  Laterality: N/A;    CORONARY ARTERY BYPASS GRAFT      CORONARY ARTERY BYPASS GRAFT (CABG) N/A 7/20/2022    Procedure: CORONARY ARTERY BYPASS GRAFT (CABG);  Surgeon: Snaju Locke MD;  Location: [facility] OR;  Service: Cardiothoracic;  Laterality: N/A;  2-VESSEL PUMP ASSIST WITH EPI-AORTIC ULTRASOUND    ENDOSCOPIC HARVEST OF VEIN Left 7/20/2022    Procedure: SURGICAL PROCUREMENT, VEIN, ENDOSCOPIC;  Surgeon: Sanju Locke MD;  Location: [facility] OR;  Service: Cardiothoracic;  Laterality: Left;    HYSTERECTOMY  1982    INJECTION OF ANESTHETIC AGENT AROUND MULTIPLE INTERCOSTAL NERVES N/A 7/20/2022    Procedure: BLOCK, NERVE, INTERCOSTAL, 2 OR MORE;  Surgeon: Sanju Locke MD;  Location: [facility] OR;  Service: Cardiothoracic;  Laterality: N/A;  PARASTERNAL  NERVE BLOCK    INSERTION OF SHUNT      LEFT HEART CATHETERIZATION Left 12/3/2018    Procedure: CATHETERIZATION, HEART, LEFT;  Surgeon: Michelle Holman MD;  Location: [facility] CATH LAB;  Service: Cardiology;  Laterality: Left;  LAD ATHERECTOMY STENT/ FEMORAL APPROACH    LEFT HEART CATHETERIZATION Left 7/13/2022    Procedure: CATHETERIZATION, HEART, LEFT;  Surgeon: Abhi Sloan MD;  Location: [facility] CATH LAB;  Service: Cardiology;  Laterality: Left;    OOPHORECTOMY      wrist cyst Right 1980s    dorsal wrist cyst     Family History   Problem Relation Age of Onset    Diabetes Mother     Hyperlipidemia Mother     Hypertension Mother     Heart disease Mother         MI    Muscular dystrophy Son     Cancer Maternal Grandmother         colon     Patient Active Problem List   Diagnosis    Constipation    Hypertension associated with diabetes    ESRD (end stage renal disease)    Proteinuria    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, with long-term current use of insulin    Cataracta brunescens of right eye    Bilateral carotid artery disease    Aortic atherosclerosis    Type 2 diabetes mellitus with circulatory disorder, with long-term current use of insulin    History of CVA (cerebrovascular accident)    Hyperparathyroidism    Vitamin D deficiency    DDD (degenerative disc disease), lumbar    Anemia in chronic kidney disease, on chronic dialysis    Iron deficiency anemia due to chronic blood loss    Hyperlipidemia associated with type 2 diabetes mellitus    Pulmonary hypertension    CAD (coronary artery disease)    Exudative age-related macular degeneration of left eye with active choroidal neovascularization    Non-rheumatic mitral regurgitation    Non-rheumatic tricuspid valve insufficiency    Age-related nuclear cataract of left eye    DM type 2 with diabetic peripheral neuropathy    Pulmonary edema    Unspecified inflammatory spondylopathy, lumbar region    Convulsions    Hemiplegia     Other specified complication of vascular prosthetic devices, implants and grafts, initial encounter    Elevated troponin    Chronic diastolic heart failure    Patient left without being seen    Metabolic acidosis    S/P CABG (coronary artery bypass graft)    COVID    Skin ulcer of toe of right foot with fat layer exposed    Type 2 diabetes mellitus with diabetic peripheral angiopathy without gangrene, with long-term current use of insulin    Acute non-recurrent sinusitis    Post-nasal drip    Allergic rhinitis     Review of patient's allergies indicates:   Allergen Reactions    Codeine Swelling    Darvon [propoxyphene] Swelling    Atorvastatin      Muscle twitching       Medications:  Current Outpatient Medications on File Prior to Visit   Medication Sig Dispense Refill    amoxicillin (AMOXIL) 500 MG Tab Take 1 tablet (500 mg total) by mouth every 12 (twelve) hours. for 7 days 14 tablet 0    aspirin (ECOTRIN) 81 MG EC tablet Take 1 tablet (81 mg total) by mouth once daily. 90 tablet 1    azelastine (ASTELIN) 137 mcg (0.1 %) nasal spray 1 spray (137 mcg total) by Nasal route 2 (two) times daily. 30 mL 0    blood sugar diagnostic (TRUE METRIX GLUCOSE TEST STRIP) Strp 1 strip by Misc.(Non-Drug; Combo Route) route 3 (three) times daily. 100 each 11    blood-glucose meter (TRUE METRIX GLUCOSE METER) Misc Use as instructed 1 each 0    cyanocobalamin (VITAMIN B-12) 1000 MCG tablet Take 100 mcg by mouth once daily.      fluocinonide (LIDEX) 0.05 % external solution Apply topically 2 (two) times daily. Use on scalp 60 mL 5    folic acid (FOLVITE) 1 MG tablet Take 1 mg by mouth once daily.      insulin NPH/Reg human (HUMULIN 70/30 U-100 KWIKPEN) 100 unit/mL (70-30) InPn pen Administer 20 units before breakfast and 8 units before dinner. On dialysis days, 10 units before dinner. 15 mL 3    lancets (TRUEPLUS LANCETS) 33 gauge Misc 1 lancet by Misc.(Non-Drug; Combo Route) route 3 (three) times daily. 100 each 11    metoprolol  "tartrate (LOPRESSOR) 50 MG tablet Take 1 tablet (50 mg total) by mouth 2 (two) times daily. 180 tablet 1    pen needle, diabetic 32 gauge x 5/32" Ndle 1 each by Misc.(Non-Drug; Combo Route) route 2 (two) times a day. 100 each 11    rosuvastatin (CRESTOR) 10 MG tablet Take 1 tablet (10 mg total) by mouth once daily. 90 tablet 3    sevelamer carbonate (RENVELA) 800 mg Tab Take 2 tablets (1,600 mg total) by mouth 3 (three) times daily with meals. 180 tablet 11    telmisartan (MICARDIS) 20 MG Tab Take 1 tablet (20 mg total) by mouth once daily. 90 tablet 1    [DISCONTINUED] azelastine (ASTELIN) 137 mcg (0.1 %) nasal spray 1 spray (137 mcg total) by Nasal route 2 (two) times daily. 30 mL 0    [DISCONTINUED] isosorbide-hydrALAZINE 20-37.5 mg (BIDIL) 20-37.5 mg Tab Take 1 tablet by mouth 3 (three) times daily. 90 tablet 1     No current facility-administered medications on file prior to visit.       Medications have been reviewed and reconciled with patient at visit today.          Exam:  There were no vitals filed for this visit.      There is no height or weight on file to calculate BMI.      BP Readings from Last 3 Encounters:   01/17/23 (!) 142/62   01/11/23 122/60   10/14/22 (!) 142/60     Wt Readings from Last 3 Encounters:   01/17/23 1441 56.3 kg (124 lb 1.6 oz)   01/11/23 1621 54.9 kg (121 lb 0.5 oz)   11/08/22 1437 53.3 kg (117 lb 8.1 oz)            Physical Exam  Vitals reviewed.   Constitutional:       Appearance: Normal appearance. She is well-developed.   HENT:      Head: Normocephalic and atraumatic.      Nose: Nose normal. No congestion or rhinorrhea.      Mouth/Throat:      Mouth: Mucous membranes are moist.      Pharynx: Oropharynx is clear. No pharyngeal swelling, oropharyngeal exudate or posterior oropharyngeal erythema.      Tonsils: No tonsillar exudate.   Eyes:      General: No scleral icterus.     Conjunctiva/sclera: Conjunctivae normal.   Cardiovascular:      Rate and Rhythm: Normal rate. Rhythm " irregular.      Heart sounds: Normal heart sounds. No murmur heard.    No friction rub. No gallop.   Pulmonary:      Effort: Pulmonary effort is normal.      Breath sounds: Normal breath sounds.   Abdominal:      General: Bowel sounds are normal.      Palpations: Abdomen is soft.   Musculoskeletal:         General: No tenderness. Normal range of motion.      Cervical back: Normal range of motion and neck supple.   Lymphadenopathy:      Cervical: No cervical adenopathy.   Skin:     General: Skin is warm and dry.   Neurological:      General: No focal deficit present.      Mental Status: She is alert and oriented to person, place, and time.   Psychiatric:         Mood and Affect: Mood normal.         Behavior: Behavior normal.         Thought Content: Thought content normal.       Laboratory Reviewed:   Lab Results   Component Value Date    WBC 9.37 08/26/2022    HGB 7.5 (L) 08/26/2022    HCT 23.8 (L) 08/26/2022     08/26/2022    CHOL 171 07/13/2022    TRIG 46 07/13/2022    HDL 59 07/13/2022    ALT 12 08/26/2022    AST 18 08/26/2022     (L) 08/26/2022    K 4.2 08/26/2022    CL 99 08/26/2022    CREATININE 5.0 (H) 08/26/2022    BUN 36 (H) 08/26/2022    CO2 23 08/26/2022    TSH 1.792 01/26/2021    INR 1.0 08/07/2022    HGBA1C 5.3 09/13/2022           Health Maintenance  Health Maintenance Topics with due status: Not Due       Topic Last Completion Date    TETANUS VACCINE 12/12/2021    Lipid Panel 07/13/2022    Eye Exam 09/01/2022    Hemoglobin A1c 09/13/2022     Health Maintenance Due   Topic Date Due    Shingles Vaccine (1 of 2) Never done    DEXA Scan  Never done    Colorectal Cancer Screening  Never done    COVID-19 Vaccine (4 - Booster for Moderna series) 02/25/2022    Foot Exam  03/08/2023       Assessment and Plan:  1. Acute non-recurrent sinusitis, unspecified location  Assessment & Plan:  Astelin nasal spray refilled  Given length of time with symptoms and immunocompromised state - amoxicillin x 7  days prescribed  Encouraged nasal saline spray to help with nasal congestion and sinus irrigation.       2. Allergic rhinitis, unspecified seasonality, unspecified trigger  Assessment & Plan:  Refill of astelin                   -Patient's lab results were reviewed and discussed with patient  -Treatment options and alternatives were discussed with the patient. Patient expressed understanding. Patient was given the opportunity to ask questions and be an active participant in their medical care. Patient had no further questions or concerns at this time.         Follow up: with Dr. Germain and appointment for AWV      After visit summary printed and given to patient upon discharge.  Patient goals and care plan are included in After visit summary.    Total medical decision making time was 40 min.    The following issues were discussed: Diagnoses of Acute non-recurrent sinusitis, unspecified location and Allergic rhinitis, unspecified seasonality, unspecified trigger were pertinent to this visit.    Health maintenance needs, recent test results and goals of care discussed with pt and questions answered.           ADEBAYO Bagley, NP-C  [facility] [address] [place] [address]

## 2023-01-17 NOTE — PROGRESS NOTES
"Avril Monzon  01/19/2023  [id number]    Rose Germain MD  Patient Care Team:  Rose Germain MD as PCP - General (Internal Medicine)  Conchita Garcia RD, CDE as Dietitian (Diabetes)  Debbie Wagner RD (Inactive) as Dietitian (Nutrition)  Angela Hanson MD as Consulting Physician (Nephrology)  Demetrio Mejia MD as Consulting Physician (Cardiology)  Conchita Garcia RD, CDE as Diabetes Educator (Diabetes)      [facility] Note      Chief Complaint:  Chief Complaint   Patient presents with    Sinus Problem     Sinus problem for about 2 months now       History of Present Illness:  Onset approx 2 months ago - nose burning,  Sneezing and rhinorrhea this AM that is tinge of blood. "Runny nose"  Denies cough, fever, chill, sinus pressure, denies PND, no sore throat  Feels some popping in ears when chewing.             ROS      The following were reviewed: Active problem list, medication list, allergies, family history, social history, and Health Maintenance.     History:  Past Medical History:   Diagnosis Date    Acute hypoxemic respiratory failure 11/26/2018    Anemia     Arthritis     back, hand, knees    Back pain     Cataract     CKD stage G4/A3, GFR 15 - 29 and albumin creatinine ratio >300 mg/g     GFR 40% Jan 2014 and 33% ub 3/2014 ([facility]G)    Coronary artery disease involving native coronary artery of native heart 11/30/2018    Diabetic retinopathy     DM (diabetes mellitus) type II controlled, neurological manifestation     Exudative age-related macular degeneration of left eye with active choroidal neovascularization 2/5/2019    GERD (gastroesophageal reflux disease)     Glaucoma     Heart failure     Hyperlipidemia     Hypertension     Non-ST elevation MI (NSTEMI) 11/28/2018    NSTEMI (non-ST elevated myocardial infarction) 11/27/2018    Peripheral neuropathy     Polyneuropathy     Proteinuria     >6 mo     Seizures     BRG 1/2014 -dick CT NRI showed small vessel    Skin ulcer of toe of right " foot with fat layer exposed 10/14/2022    Stroke 2012,2014    Tobacco dependence     Type 2 diabetes with peripheral circulatory disorder, controlled      Past Surgical History:   Procedure Laterality Date    BREAST LUMPECTOMY Left     benign, when patient was 15 years old    BREAST MASS EXCISION      ,benign, age 15.    CATHETERIZATION OF BOTH LEFT AND RIGHT HEART N/A 11/28/2018    Procedure: CATHETERIZATION, HEART, BOTH LEFT AND RIGHT;  Surgeon: Michelle Holman MD;  Location: [facility] CATH LAB;  Service: Cardiology;  Laterality: N/A;    CATHETERIZATION OF BOTH LEFT AND RIGHT HEART N/A 11/30/2018    Procedure: CATHETERIZATION, HEART, BOTH LEFT AND RIGHT;  Surgeon: Michelle Holman MD;  Location: [facility] CATH LAB;  Service: Cardiology;  Laterality: N/A;    CORONARY ARTERY BYPASS GRAFT      CORONARY ARTERY BYPASS GRAFT (CABG) N/A 7/20/2022    Procedure: CORONARY ARTERY BYPASS GRAFT (CABG);  Surgeon: Sanju Locke MD;  Location: [facility] OR;  Service: Cardiothoracic;  Laterality: N/A;  2-VESSEL PUMP ASSIST WITH EPI-AORTIC ULTRASOUND    ENDOSCOPIC HARVEST OF VEIN Left 7/20/2022    Procedure: SURGICAL PROCUREMENT, VEIN, ENDOSCOPIC;  Surgeon: Sanju Locke MD;  Location: [facility] OR;  Service: Cardiothoracic;  Laterality: Left;    HYSTERECTOMY  1982    INJECTION OF ANESTHETIC AGENT AROUND MULTIPLE INTERCOSTAL NERVES N/A 7/20/2022    Procedure: BLOCK, NERVE, INTERCOSTAL, 2 OR MORE;  Surgeon: Sanju Locke MD;  Location: [facility] OR;  Service: Cardiothoracic;  Laterality: N/A;  PARASTERNAL NERVE BLOCK    INSERTION OF SHUNT      LEFT HEART CATHETERIZATION Left 12/3/2018    Procedure: CATHETERIZATION, HEART, LEFT;  Surgeon: Michelle Holman MD;  Location: [facility] CATH LAB;  Service: Cardiology;  Laterality: Left;  LAD ATHERECTOMY STENT/ FEMORAL APPROACH    LEFT HEART CATHETERIZATION Left 7/13/2022    Procedure: CATHETERIZATION, HEART, LEFT;  Surgeon: Abhi Sloan MD;  Location: [facility] CATH LAB;  Service: Cardiology;  Laterality:  Left;    OOPHORECTOMY      wrist cyst Right 1980s    dorsal wrist cyst     Family History   Problem Relation Age of Onset    Diabetes Mother     Hyperlipidemia Mother     Hypertension Mother     Heart disease Mother         MI    Muscular dystrophy Son     Cancer Maternal Grandmother         colon     Patient Active Problem List   Diagnosis    Constipation    Hypertension associated with diabetes    ESRD (end stage renal disease)    Proteinuria    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, with long-term current use of insulin    Cataracta brunescens of right eye    Bilateral carotid artery disease    Aortic atherosclerosis    Type 2 diabetes mellitus with circulatory disorder, with long-term current use of insulin    History of CVA (cerebrovascular accident)    Hyperparathyroidism    Vitamin D deficiency    DDD (degenerative disc disease), lumbar    Anemia in chronic kidney disease, on chronic dialysis    Iron deficiency anemia due to chronic blood loss    Hyperlipidemia associated with type 2 diabetes mellitus    Pulmonary hypertension    CAD (coronary artery disease)    Exudative age-related macular degeneration of left eye with active choroidal neovascularization    Non-rheumatic mitral regurgitation    Non-rheumatic tricuspid valve insufficiency    Age-related nuclear cataract of left eye    DM type 2 with diabetic peripheral neuropathy    Pulmonary edema    Unspecified inflammatory spondylopathy, lumbar region    Convulsions    Hemiplegia    Other specified complication of vascular prosthetic devices, implants and grafts, initial encounter    Elevated troponin    Chronic diastolic heart failure    Patient left without being seen    Metabolic acidosis    S/P CABG (coronary artery bypass graft)    COVID    Skin ulcer of toe of right foot with fat layer exposed    Type 2 diabetes mellitus with diabetic peripheral angiopathy without gangrene, with long-term current use of insulin  "   Acute non-recurrent sinusitis    Post-nasal drip    Allergic rhinitis     Review of patient's allergies indicates:   Allergen Reactions    Codeine Swelling    Darvon [propoxyphene] Swelling    Atorvastatin      Muscle twitching       Medications:  Current Outpatient Medications on File Prior to Visit   Medication Sig Dispense Refill    aspirin (ECOTRIN) 81 MG EC tablet Take 1 tablet (81 mg total) by mouth once daily. 90 tablet 1    blood sugar diagnostic (TRUE METRIX GLUCOSE TEST STRIP) Strp 1 strip by Misc.(Non-Drug; Combo Route) route 3 (three) times daily. 100 each 11    blood-glucose meter (TRUE METRIX GLUCOSE METER) Misc Use as instructed 1 each 0    cyanocobalamin (VITAMIN B-12) 1000 MCG tablet Take 100 mcg by mouth once daily.      fluocinonide (LIDEX) 0.05 % external solution Apply topically 2 (two) times daily. Use on scalp 60 mL 5    folic acid (FOLVITE) 1 MG tablet Take 1 mg by mouth once daily.      insulin NPH/Reg human (HUMULIN 70/30 U-100 KWIKPEN) 100 unit/mL (70-30) InPn pen Administer 20 units before breakfast and 8 units before dinner. On dialysis days, 10 units before dinner. 15 mL 3    lancets (TRUEPLUS LANCETS) 33 gauge Misc 1 lancet by Misc.(Non-Drug; Combo Route) route 3 (three) times daily. 100 each 11    metoprolol tartrate (LOPRESSOR) 50 MG tablet Take 1 tablet (50 mg total) by mouth 2 (two) times daily. 180 tablet 1    pen needle, diabetic 32 gauge x 5/32" Ndle 1 each by Misc.(Non-Drug; Combo Route) route 2 (two) times a day. 100 each 11    rosuvastatin (CRESTOR) 10 MG tablet Take 1 tablet (10 mg total) by mouth once daily. 90 tablet 3    sevelamer carbonate (RENVELA) 800 mg Tab Take 2 tablets (1,600 mg total) by mouth 3 (three) times daily with meals. 180 tablet 11    telmisartan (MICARDIS) 20 MG Tab Take 1 tablet (20 mg total) by mouth once daily. 90 tablet 1    [DISCONTINUED] isosorbide-hydrALAZINE 20-37.5 mg (BIDIL) 20-37.5 mg Tab Take 1 tablet by mouth 3 (three) times daily. 90 " tablet 1     No current facility-administered medications on file prior to visit.       Medications have been reviewed and reconciled with patient at visit today.          Exam:  Vitals:    01/17/23 1441   BP: (!) 142/62   Pulse: 70   Temp: 98 °F (36.7 °C)     Weight: 56.3 kg (124 lb 1.6 oz)   Body mass index is 21.3 kg/m².      BP Readings from Last 3 Encounters:   01/17/23 (!) 142/62   01/11/23 122/60   10/14/22 (!) 142/60     Wt Readings from Last 3 Encounters:   01/17/23 1441 56.3 kg (124 lb 1.6 oz)   01/11/23 1621 54.9 kg (121 lb 0.5 oz)   11/08/22 1437 53.3 kg (117 lb 8.1 oz)            Physical Exam  Vitals reviewed.   Constitutional:       Appearance: Normal appearance. She is well-developed. She is not ill-appearing.   HENT:      Head: Normocephalic and atraumatic.      Right Ear: Tympanic membrane normal.      Left Ear: Tympanic membrane normal.      Nose: Nose normal.      Mouth/Throat:      Mouth: Mucous membranes are moist.      Pharynx: Oropharynx is clear. No pharyngeal swelling or posterior oropharyngeal erythema.      Tonsils: No tonsillar exudate.   Eyes:      General: No scleral icterus.     Conjunctiva/sclera: Conjunctivae normal.      Pupils: Pupils are equal, round, and reactive to light.   Cardiovascular:      Rate and Rhythm: Normal rate and regular rhythm.      Heart sounds: Murmur heard.     No friction rub. No gallop.   Pulmonary:      Effort: Pulmonary effort is normal.      Breath sounds: Normal breath sounds. No wheezing, rhonchi or rales.   Musculoskeletal:         General: Normal range of motion.      Cervical back: Normal range of motion and neck supple.   Lymphadenopathy:      Cervical: No cervical adenopathy.   Skin:     General: Skin is warm and dry.      Coloration: Skin is not pale.   Neurological:      General: No focal deficit present.      Mental Status: She is alert and oriented to person, place, and time.   Psychiatric:         Behavior: Behavior normal.         Thought  Content: Thought content normal.       Laboratory Reviewed:   Lab Results   Component Value Date    WBC 9.37 08/26/2022    HGB 7.5 (L) 08/26/2022    HCT 23.8 (L) 08/26/2022     08/26/2022    CHOL 171 07/13/2022    TRIG 46 07/13/2022    HDL 59 07/13/2022    ALT 12 08/26/2022    AST 18 08/26/2022     (L) 08/26/2022    K 4.2 08/26/2022    CL 99 08/26/2022    CREATININE 5.0 (H) 08/26/2022    BUN 36 (H) 08/26/2022    CO2 23 08/26/2022    TSH 1.792 01/26/2021    INR 1.0 08/07/2022    HGBA1C 5.3 09/13/2022           Health Maintenance  Health Maintenance Topics with due status: Not Due       Topic Last Completion Date    TETANUS VACCINE 12/12/2021    Lipid Panel 07/13/2022    Eye Exam 09/01/2022    Hemoglobin A1c 09/13/2022     Health Maintenance Due   Topic Date Due    Shingles Vaccine (1 of 2) Never done    DEXA Scan  Never done    Colorectal Cancer Screening  Never done    COVID-19 Vaccine (4 - Booster for Moderna series) 02/25/2022    Foot Exam  03/08/2023       Assessment and Plan:  1. Acute non-recurrent sinusitis, unspecified location  Assessment & Plan:  Given patient's immunocompromised state and recurrent symptoms, amoxicillin prescribed  Rest  Drink plenty of clear fluids  Nasal saline spray to clear nasal drainage and help with nasal congestion  Mucinex or Mucinex DM for cough and chest congestion  Tylenol for fever, headache and body aches  Warm salt water gargles for throat comfort  Chloraseptic spray or lozenges for throat comfort        Orders:  -     azelastine (ASTELIN) 137 mcg (0.1 %) nasal spray; 1 spray (137 mcg total) by Nasal route 2 (two) times daily.  Dispense: 30 mL; Refill: 0    2. Allergic rhinitis, unspecified seasonality, unspecified trigger  -     azelastine (ASTELIN) 137 mcg (0.1 %) nasal spray; 1 spray (137 mcg total) by Nasal route 2 (two) times daily.  Dispense: 30 mL; Refill: 0    3. Post-nasal drip  -     azelastine (ASTELIN) 137 mcg (0.1 %) nasal spray; 1 spray (137 mcg  total) by Nasal route 2 (two) times daily.  Dispense: 30 mL; Refill: 0    Other orders  -     amoxicillin (AMOXIL) 500 MG Tab; Take 1 tablet (500 mg total) by mouth every 12 (twelve) hours. for 7 days  Dispense: 14 tablet; Refill: 0                 -Patient's lab results were reviewed and discussed with patient  -Treatment options and alternatives were discussed with the patient. Patient expressed understanding. Patient was given the opportunity to ask questions and be an active participant in their medical care. Patient had no further questions or concerns at this time.         Follow up: 2/14/23 - Dr. Husain      After visit summary printed and given to patient upon discharge.  Patient goals and care plan are included in After visit summary.    Total medical decision making time was 30 min.    The following issues were discussed: The primary encounter diagnosis was Acute non-recurrent sinusitis, unspecified location. Diagnoses of Allergic rhinitis, unspecified seasonality, unspecified trigger and Post-nasal drip were also pertinent to this visit.    Health maintenance needs, recent test results and goals of care discussed with pt and questions answered.           ADEBAYO Bagley, NP-C  [facility]  [address] [place] [address]

## 2023-01-19 ENCOUNTER — OFFICE VISIT (OUTPATIENT)
Dept: CARDIOLOGY | Facility: CLINIC | Age: 78
End: 2023-01-19
Payer: MEDICARE

## 2023-01-19 ENCOUNTER — TELEPHONE (OUTPATIENT)
Dept: OPHTHALMOLOGY | Facility: CLINIC | Age: 78
End: 2023-01-19
Payer: MEDICARE

## 2023-01-19 VITALS
SYSTOLIC BLOOD PRESSURE: 130 MMHG | HEIGHT: 64 IN | DIASTOLIC BLOOD PRESSURE: 60 MMHG | HEART RATE: 73 BPM | BODY MASS INDEX: 21.07 KG/M2 | WEIGHT: 123.44 LBS | OXYGEN SATURATION: 100 %

## 2023-01-19 DIAGNOSIS — E87.20 METABOLIC ACIDOSIS: ICD-10-CM

## 2023-01-19 DIAGNOSIS — Z95.1 S/P CABG (CORONARY ARTERY BYPASS GRAFT): ICD-10-CM

## 2023-01-19 DIAGNOSIS — L97.512 SKIN ULCER OF TOE OF RIGHT FOOT WITH FAT LAYER EXPOSED: ICD-10-CM

## 2023-01-19 DIAGNOSIS — I70.0 AORTIC ATHEROSCLEROSIS: ICD-10-CM

## 2023-01-19 DIAGNOSIS — I34.0 NON-RHEUMATIC MITRAL REGURGITATION: ICD-10-CM

## 2023-01-19 DIAGNOSIS — R56.9 CONVULSIONS, UNSPECIFIED CONVULSION TYPE: ICD-10-CM

## 2023-01-19 DIAGNOSIS — N18.6 ESRD (END STAGE RENAL DISEASE): ICD-10-CM

## 2023-01-19 DIAGNOSIS — E21.3 HYPERPARATHYROIDISM: ICD-10-CM

## 2023-01-19 DIAGNOSIS — E11.69 HYPERLIPIDEMIA ASSOCIATED WITH TYPE 2 DIABETES MELLITUS: ICD-10-CM

## 2023-01-19 DIAGNOSIS — D63.1 ANEMIA IN CHRONIC KIDNEY DISEASE, ON CHRONIC DIALYSIS: ICD-10-CM

## 2023-01-19 DIAGNOSIS — E11.59 HYPERTENSION ASSOCIATED WITH DIABETES: ICD-10-CM

## 2023-01-19 DIAGNOSIS — Z99.2 ANEMIA IN CHRONIC KIDNEY DISEASE, ON CHRONIC DIALYSIS: ICD-10-CM

## 2023-01-19 DIAGNOSIS — Z79.4 TYPE 2 DIABETES MELLITUS WITH STABLE PROLIFERATIVE RETINOPATHY OF BOTH EYES, WITH LONG-TERM CURRENT USE OF INSULIN: ICD-10-CM

## 2023-01-19 DIAGNOSIS — I50.32 CHRONIC DIASTOLIC HEART FAILURE: ICD-10-CM

## 2023-01-19 DIAGNOSIS — E11.42 DM TYPE 2 WITH DIABETIC PERIPHERAL NEUROPATHY: ICD-10-CM

## 2023-01-19 DIAGNOSIS — E78.5 HYPERLIPIDEMIA ASSOCIATED WITH TYPE 2 DIABETES MELLITUS: ICD-10-CM

## 2023-01-19 DIAGNOSIS — I15.2 HYPERTENSION ASSOCIATED WITH DIABETES: ICD-10-CM

## 2023-01-19 DIAGNOSIS — I25.118 CORONARY ARTERY DISEASE OF NATIVE ARTERY OF NATIVE HEART WITH STABLE ANGINA PECTORIS: Primary | ICD-10-CM

## 2023-01-19 DIAGNOSIS — E55.9 VITAMIN D DEFICIENCY: ICD-10-CM

## 2023-01-19 DIAGNOSIS — Z79.4 TYPE 2 DIABETES MELLITUS WITH DIABETIC PERIPHERAL ANGIOPATHY WITHOUT GANGRENE, WITH LONG-TERM CURRENT USE OF INSULIN: ICD-10-CM

## 2023-01-19 DIAGNOSIS — I27.20 PULMONARY HYPERTENSION: ICD-10-CM

## 2023-01-19 DIAGNOSIS — I36.1 NON-RHEUMATIC TRICUSPID VALVE INSUFFICIENCY: ICD-10-CM

## 2023-01-19 DIAGNOSIS — J81.1 CHRONIC PULMONARY EDEMA: ICD-10-CM

## 2023-01-19 DIAGNOSIS — R79.89 ELEVATED TROPONIN: ICD-10-CM

## 2023-01-19 DIAGNOSIS — N18.6 ANEMIA IN CHRONIC KIDNEY DISEASE, ON CHRONIC DIALYSIS: ICD-10-CM

## 2023-01-19 DIAGNOSIS — E11.51 TYPE 2 DIABETES MELLITUS WITH DIABETIC PERIPHERAL ANGIOPATHY WITHOUT GANGRENE, WITH LONG-TERM CURRENT USE OF INSULIN: ICD-10-CM

## 2023-01-19 DIAGNOSIS — E11.3553 TYPE 2 DIABETES MELLITUS WITH STABLE PROLIFERATIVE RETINOPATHY OF BOTH EYES, WITH LONG-TERM CURRENT USE OF INSULIN: ICD-10-CM

## 2023-01-19 PROCEDURE — 99999 PR PBB SHADOW E&M-EST. PATIENT-LVL III: ICD-10-PCS | Mod: PBBFAC,HCNC,, | Performed by: INTERNAL MEDICINE

## 2023-01-19 PROCEDURE — 1157F ADVNC CARE PLAN IN RCRD: CPT | Mod: HCNC,CPTII,S$GLB, | Performed by: INTERNAL MEDICINE

## 2023-01-19 PROCEDURE — 3075F SYST BP GE 130 - 139MM HG: CPT | Mod: HCNC,CPTII,S$GLB, | Performed by: INTERNAL MEDICINE

## 2023-01-19 PROCEDURE — 3078F PR MOST RECENT DIASTOLIC BLOOD PRESSURE < 80 MM HG: ICD-10-PCS | Mod: HCNC,CPTII,S$GLB, | Performed by: INTERNAL MEDICINE

## 2023-01-19 PROCEDURE — 1159F MED LIST DOCD IN RCRD: CPT | Mod: HCNC,CPTII,S$GLB, | Performed by: INTERNAL MEDICINE

## 2023-01-19 PROCEDURE — 1126F AMNT PAIN NOTED NONE PRSNT: CPT | Mod: HCNC,CPTII,S$GLB, | Performed by: INTERNAL MEDICINE

## 2023-01-19 PROCEDURE — 99214 OFFICE O/P EST MOD 30 MIN: CPT | Mod: HCNC,S$GLB,, | Performed by: INTERNAL MEDICINE

## 2023-01-19 PROCEDURE — 99999 PR PBB SHADOW E&M-EST. PATIENT-LVL III: CPT | Mod: PBBFAC,HCNC,, | Performed by: INTERNAL MEDICINE

## 2023-01-19 PROCEDURE — 1159F PR MEDICATION LIST DOCUMENTED IN MEDICAL RECORD: ICD-10-PCS | Mod: HCNC,CPTII,S$GLB, | Performed by: INTERNAL MEDICINE

## 2023-01-19 PROCEDURE — 99214 PR OFFICE/OUTPT VISIT, EST, LEVL IV, 30-39 MIN: ICD-10-PCS | Mod: HCNC,S$GLB,, | Performed by: INTERNAL MEDICINE

## 2023-01-19 PROCEDURE — 1126F PR PAIN SEVERITY QUANTIFIED, NO PAIN PRESENT: ICD-10-PCS | Mod: HCNC,CPTII,S$GLB, | Performed by: INTERNAL MEDICINE

## 2023-01-19 PROCEDURE — 3075F PR MOST RECENT SYSTOLIC BLOOD PRESS GE 130-139MM HG: ICD-10-PCS | Mod: HCNC,CPTII,S$GLB, | Performed by: INTERNAL MEDICINE

## 2023-01-19 PROCEDURE — 1160F RVW MEDS BY RX/DR IN RCRD: CPT | Mod: HCNC,CPTII,S$GLB, | Performed by: INTERNAL MEDICINE

## 2023-01-19 PROCEDURE — 3078F DIAST BP <80 MM HG: CPT | Mod: HCNC,CPTII,S$GLB, | Performed by: INTERNAL MEDICINE

## 2023-01-19 PROCEDURE — 1160F PR REVIEW ALL MEDS BY PRESCRIBER/CLIN PHARMACIST DOCUMENTED: ICD-10-PCS | Mod: HCNC,CPTII,S$GLB, | Performed by: INTERNAL MEDICINE

## 2023-01-19 PROCEDURE — 1157F PR ADVANCE CARE PLAN OR EQUIV PRESENT IN MEDICAL RECORD: ICD-10-PCS | Mod: HCNC,CPTII,S$GLB, | Performed by: INTERNAL MEDICINE

## 2023-01-19 RX ORDER — ROSUVASTATIN CALCIUM 20 MG/1
20 TABLET, COATED ORAL DAILY
Qty: 90 TABLET | Refills: 3 | Status: SHIPPED | OUTPATIENT
Start: 2023-01-19 | End: 2023-04-20 | Stop reason: SDUPTHER

## 2023-01-19 NOTE — PROGRESS NOTES
Subjective:   Patient ID:  Avril Monzon is a 77 y.o. female who presents for follow up of No chief complaint on file.      HPI  10/14/2022  A 78 yo female well known to me from previous encounter with cad s/p intervention nstemi esrd on hd underwent cabg subsequently had covid and  pulmonary edema. Has recovered well she is diabetic went to see podiatry had rt toe ulcer as well as heel ulcers bilaterally being managed with betadine application no discharge no pain     1/19/2023   Here frof /u has been doing well clinically has no chest pain gets a little hypotension on dialysis at the end her dry weight is getting adjusted compliant with meds has knots on leg told she has thrombosed varicosities still has  ulcer on big toe follows with podiatry. No fever chills syncope near syncope chf symptoms or angina. She is not taking her statins.   Past Medical History:   Diagnosis Date    Acute hypoxemic respiratory failure 11/26/2018    Anemia     Arthritis     back, hand, knees    Back pain     Cataract     CKD stage G4/A3, GFR 15 - 29 and albumin creatinine ratio >300 mg/g     GFR 40% Jan 2014 and 33% ub 3/2014 ([facility])    Coronary artery disease involving native coronary artery of native heart 11/30/2018    Diabetic retinopathy     DM (diabetes mellitus) type II controlled, neurological manifestation     Exudative age-related macular degeneration of left eye with active choroidal neovascularization 2/5/2019    GERD (gastroesophageal reflux disease)     Glaucoma     Heart failure     Hyperlipidemia     Hypertension     Non-ST elevation MI (NSTEMI) 11/28/2018    NSTEMI (non-ST elevated myocardial infarction) 11/27/2018    Peripheral neuropathy     Polyneuropathy     Proteinuria     >6 mo     Seizures     BRG 1/2014 -dick CT NRI showed small vessel    Skin ulcer of toe of right foot with fat layer exposed 10/14/2022    Stroke 2012,2014    Tobacco dependence     Type 2 diabetes with peripheral circulatory disorder, controlled         Past Surgical History:   Procedure Laterality Date    BREAST LUMPECTOMY Left     benign, when patient was 15 years old    BREAST MASS EXCISION      ,benign, age 15.    CATHETERIZATION OF BOTH LEFT AND RIGHT HEART N/A 11/28/2018    Procedure: CATHETERIZATION, HEART, BOTH LEFT AND RIGHT;  Surgeon: Michelle Holman MD;  Location: [facility] CATH LAB;  Service: Cardiology;  Laterality: N/A;    CATHETERIZATION OF BOTH LEFT AND RIGHT HEART N/A 11/30/2018    Procedure: CATHETERIZATION, HEART, BOTH LEFT AND RIGHT;  Surgeon: Michelle Holman MD;  Location: [facility] CATH LAB;  Service: Cardiology;  Laterality: N/A;    CORONARY ARTERY BYPASS GRAFT      CORONARY ARTERY BYPASS GRAFT (CABG) N/A 7/20/2022    Procedure: CORONARY ARTERY BYPASS GRAFT (CABG);  Surgeon: Sanju Locke MD;  Location: [facility];  Service: Cardiothoracic;  Laterality: N/A;  2-VESSEL PUMP ASSIST WITH EPI-AORTIC ULTRASOUND    ENDOSCOPIC HARVEST OF VEIN Left 7/20/2022    Procedure: SURGICAL PROCUREMENT, VEIN, ENDOSCOPIC;  Surgeon: Sanju Locke MD;  Location: [facility];  Service: Cardiothoracic;  Laterality: Left;    HYSTERECTOMY  1982    INJECTION OF ANESTHETIC AGENT AROUND MULTIPLE INTERCOSTAL NERVES N/A 7/20/2022    Procedure: BLOCK, NERVE, INTERCOSTAL, 2 OR MORE;  Surgeon: Sanju Locke MD;  Location: [facility];  Service: Cardiothoracic;  Laterality: N/A;  PARASTERNAL NERVE BLOCK    INSERTION OF SHUNT      LEFT HEART CATHETERIZATION Left 12/3/2018    Procedure: CATHETERIZATION, HEART, LEFT;  Surgeon: Michelle Holman MD;  Location: [facility] CATH LAB;  Service: Cardiology;  Laterality: Left;  LAD ATHERECTOMY STENT/ FEMORAL APPROACH    LEFT HEART CATHETERIZATION Left 7/13/2022    Procedure: CATHETERIZATION, HEART, LEFT;  Surgeon: Abhi Sloan MD;  Location: [facility] CATH LAB;  Service: Cardiology;  Laterality: Left;    OOPHORECTOMY      wrist cyst Right 1980s    dorsal wrist cyst       Social History     Tobacco Use    Smoking status: Former     Packs/day:  "1.50     Years: 30.00     Pack years: 45.00     Types: Cigarettes     Quit date: 1990     Years since quittin.0    Smokeless tobacco: Former   Substance Use Topics    Alcohol use: No     Alcohol/week: 0.0 standard drinks    Drug use: No       Family History   Problem Relation Age of Onset    Diabetes Mother     Hyperlipidemia Mother     Hypertension Mother     Heart disease Mother         MI    Muscular dystrophy Son     Cancer Maternal Grandmother         colon       Current Outpatient Medications   Medication Sig    amoxicillin (AMOXIL) 500 MG Tab Take 1 tablet (500 mg total) by mouth every 12 (twelve) hours. for 7 days    aspirin (ECOTRIN) 81 MG EC tablet Take 1 tablet (81 mg total) by mouth once daily.    azelastine (ASTELIN) 137 mcg (0.1 %) nasal spray 1 spray (137 mcg total) by Nasal route 2 (two) times daily.    blood sugar diagnostic (TRUE METRIX GLUCOSE TEST STRIP) Strp 1 strip by Misc.(Non-Drug; Combo Route) route 3 (three) times daily.    blood-glucose meter (TRUE METRIX GLUCOSE METER) Misc Use as instructed    cyanocobalamin (VITAMIN B-12) 1000 MCG tablet Take 100 mcg by mouth once daily.    fluocinonide (LIDEX) 0.05 % external solution Apply topically 2 (two) times daily. Use on scalp    folic acid (FOLVITE) 1 MG tablet Take 1 mg by mouth once daily.    insulin NPH/Reg human (HUMULIN 70/30 U-100 KWIKPEN) 100 unit/mL (70-30) InPn pen Administer 20 units before breakfast and 8 units before dinner. On dialysis days, 10 units before dinner.    lancets (TRUEPLUS LANCETS) 33 gauge Misc 1 lancet by Misc.(Non-Drug; Combo Route) route 3 (three) times daily.    metoprolol tartrate (LOPRESSOR) 50 MG tablet Take 1 tablet (50 mg total) by mouth 2 (two) times daily.    pen needle, diabetic 32 gauge x 5/32" Ndle 1 each by Misc.(Non-Drug; Combo Route) route 2 (two) times a day.    rosuvastatin (CRESTOR) 10 MG tablet Take 1 tablet (10 mg total) by mouth once daily.    sevelamer carbonate (RENVELA) 800 mg Tab " "Take 2 tablets (1,600 mg total) by mouth 3 (three) times daily with meals.    telmisartan (MICARDIS) 20 MG Tab Take 1 tablet (20 mg total) by mouth once daily.     No current facility-administered medications for this visit.     Current Outpatient Medications on File Prior to Visit   Medication Sig    amoxicillin (AMOXIL) 500 MG Tab Take 1 tablet (500 mg total) by mouth every 12 (twelve) hours. for 7 days    aspirin (ECOTRIN) 81 MG EC tablet Take 1 tablet (81 mg total) by mouth once daily.    azelastine (ASTELIN) 137 mcg (0.1 %) nasal spray 1 spray (137 mcg total) by Nasal route 2 (two) times daily.    blood sugar diagnostic (TRUE METRIX GLUCOSE TEST STRIP) Strp 1 strip by Misc.(Non-Drug; Combo Route) route 3 (three) times daily.    blood-glucose meter (TRUE METRIX GLUCOSE METER) Misc Use as instructed    cyanocobalamin (VITAMIN B-12) 1000 MCG tablet Take 100 mcg by mouth once daily.    fluocinonide (LIDEX) 0.05 % external solution Apply topically 2 (two) times daily. Use on scalp    folic acid (FOLVITE) 1 MG tablet Take 1 mg by mouth once daily.    insulin NPH/Reg human (HUMULIN 70/30 U-100 KWIKPEN) 100 unit/mL (70-30) InPn pen Administer 20 units before breakfast and 8 units before dinner. On dialysis days, 10 units before dinner.    lancets (TRUEPLUS LANCETS) 33 gauge Misc 1 lancet by Misc.(Non-Drug; Combo Route) route 3 (three) times daily.    metoprolol tartrate (LOPRESSOR) 50 MG tablet Take 1 tablet (50 mg total) by mouth 2 (two) times daily.    pen needle, diabetic 32 gauge x 5/32" Ndle 1 each by Misc.(Non-Drug; Combo Route) route 2 (two) times a day.    rosuvastatin (CRESTOR) 10 MG tablet Take 1 tablet (10 mg total) by mouth once daily.    sevelamer carbonate (RENVELA) 800 mg Tab Take 2 tablets (1,600 mg total) by mouth 3 (three) times daily with meals.    telmisartan (MICARDIS) 20 MG Tab Take 1 tablet (20 mg total) by mouth once daily.    [DISCONTINUED] isosorbide-hydrALAZINE 20-37.5 mg (BIDIL) 20-37.5 mg " Tab Take 1 tablet by mouth 3 (three) times daily.     No current facility-administered medications on file prior to visit.     Review of patient's allergies indicates:   Allergen Reactions    Codeine Swelling    Darvon [propoxyphene] Swelling    Atorvastatin      Muscle twitching      Review of Systems   Constitutional: Negative for diaphoresis, malaise/fatigue and weight gain.   HENT:  Negative for hoarse voice.    Eyes:  Negative for double vision and visual disturbance.   Cardiovascular:  Negative for chest pain, claudication, cyanosis, dyspnea on exertion, irregular heartbeat, leg swelling, near-syncope, orthopnea, palpitations, paroxysmal nocturnal dyspnea and syncope.   Respiratory:  Negative for cough, hemoptysis, shortness of breath and snoring.    Hematologic/Lymphatic: Negative for bleeding problem. Does not bruise/bleed easily.   Skin:  Positive for poor wound healing. Negative for color change.   Musculoskeletal:  Negative for muscle cramps, muscle weakness and myalgias.   Gastrointestinal:  Negative for bloating, abdominal pain, change in bowel habit, diarrhea, heartburn, hematemesis, hematochezia, melena and nausea.   Neurological:  Negative for excessive daytime sleepiness, dizziness, headaches, light-headedness, loss of balance, numbness and weakness.   Psychiatric/Behavioral:  Negative for memory loss. The patient does not have insomnia.    Allergic/Immunologic: Negative for hives.     Objective:   Physical Exam  Constitutional:       General: She is not in acute distress.     Appearance: Normal appearance. She is well-developed. She is not ill-appearing.   HENT:      Head: Normocephalic and atraumatic.   Eyes:      General: No scleral icterus.     Pupils: Pupils are equal, round, and reactive to light.   Neck:      Thyroid: No thyromegaly.      Vascular: Normal carotid pulses. No carotid bruit, hepatojugular reflux or JVD.      Trachea: No tracheal deviation.   Cardiovascular:      Rate and Rhythm:  Normal rate and regular rhythm.      Pulses: Normal pulses.           Carotid pulses are 2+ on the right side with bruit and 2+ on the left side with bruit.       Radial pulses are 2+ on the right side and 2+ on the left side.        Femoral pulses are 2+ on the right side and 2+ on the left side.       Popliteal pulses are 2+ on the right side.        Dorsalis pedis pulses are 2+ on the right side.        Posterior tibial pulses are 2+ on the right side and 2+ on the left side.      Heart sounds: Murmur heard.   Harsh midsystolic murmur is present with a grade of 2/6 at the upper right sternal border radiating to the neck.     No friction rub. No gallop.      Comments: Left shunt patent.  Pulmonary:      Effort: Pulmonary effort is normal. No respiratory distress.      Breath sounds: Normal breath sounds. No wheezing, rhonchi or rales.      Comments: Scar cabg well [id number].   Chest:      Chest wall: No tenderness.   Abdominal:      General: Bowel sounds are normal. There is no abdominal bruit.      Palpations: Abdomen is soft. There is no hepatomegaly or pulsatile mass.      Tenderness: There is no abdominal tenderness.   Musculoskeletal:      Right shoulder: No deformity.      Cervical back: Normal range of motion and neck supple.      Right lower leg: No edema.      Left lower leg: No edema.      Comments: Scar venous harvest well healed.    Skin:     General: Skin is warm and dry.      Findings: No erythema or rash.      Nails: There is no clubbing.   Neurological:      Mental Status: She is alert and oriented to person, place, and time.      Cranial Nerves: No cranial nerve deficit.      Coordination: Coordination normal.   Psychiatric:         Speech: Speech normal.         Behavior: Behavior normal.         Thought Content: Thought content normal.         Judgment: Judgment normal.     Vitals:    01/19/23 1436   BP: 130/60   BP Location: Right arm   Patient Position: Sitting   BP Method: Medium (Manual)  "  Pulse: 73   SpO2: 100%   Weight: 56 kg (123 lb 7.3 oz)   Height: 5' 4" (1.626 m)     Lab Results   Component Value Date    CHOL 171 07/13/2022    CHOL 246 (H) 10/19/2021    CHOL 239 (H) 01/26/2021     Lab Results   Component Value Date    HDL 59 07/13/2022    HDL 62 10/19/2021    HDL 82 (H) 01/26/2021     Lab Results   Component Value Date    LDLCALC 102.8 07/13/2022    LDLCALC 159.6 (H) 10/19/2021    LDLCALC 136.2 01/26/2021     Lab Results   Component Value Date    TRIG 46 07/13/2022    TRIG 122 10/19/2021    TRIG 104 01/26/2021     Lab Results   Component Value Date    CHOLHDL 34.5 07/13/2022    CHOLHDL 25.2 10/19/2021    CHOLHDL 34.3 01/26/2021       Chemistry        Component Value Date/Time     (L) 08/26/2022 1222    K 4.2 08/26/2022 1222    CL 99 08/26/2022 1222    CO2 23 08/26/2022 1222    BUN 36 (H) 08/26/2022 1222    CREATININE 5.0 (H) 08/26/2022 1222     (H) 08/26/2022 1222        Component Value Date/Time    CALCIUM 9.2 08/26/2022 1222    ALKPHOS 89 08/26/2022 1222    AST 18 08/26/2022 1222    ALT 12 08/26/2022 1222    BILITOT 0.7 08/26/2022 1222    ESTGFRAFRICA 8 (A) 07/27/2022 0433    ESTGFRAFRICA 8 (A) 07/27/2022 0433    EGFRNONAA 7 (A) 07/27/2022 0433    EGFRNONAA 7 (A) 07/27/2022 0433        Lab Results   Component Value Date    HGBA1C 5.3 09/13/2022         Lab Results   Component Value Date    TSH 1.792 01/26/2021     Lab Results   Component Value Date    INR 1.0 08/07/2022    INR 1.3 (H) 07/21/2022    INR 1.4 (H) 07/20/2022     Lab Results   Component Value Date    WBC 9.37 08/26/2022    HGB 7.5 (L) 08/26/2022    HCT 23.8 (L) 08/26/2022    MCV 98 08/26/2022     08/26/2022     BMP  Sodium   Date Value Ref Range Status   08/26/2022 134 (L) 136 - 145 mmol/L Final     Potassium   Date Value Ref Range Status   08/26/2022 4.2 3.5 - 5.1 mmol/L Final     Chloride   Date Value Ref Range Status   08/26/2022 99 95 - 110 mmol/L Final     CO2   Date Value Ref Range Status   08/26/2022 23 " 23 - 29 mmol/L Final     BUN   Date Value Ref Range Status   08/26/2022 36 (H) 8 - 23 mg/dL Final     Creatinine   Date Value Ref Range Status   08/26/2022 5.0 (H) 0.5 - 1.4 mg/dL Final     Calcium   Date Value Ref Range Status   08/26/2022 9.2 8.7 - 10.5 mg/dL Final     Anion Gap   Date Value Ref Range Status   08/26/2022 12 8 - 16 mmol/L Final     eGFR if    Date Value Ref Range Status   07/27/2022 8 (A) >60 mL/min/1.73 m^2 Final   07/27/2022 8 (A) >60 mL/min/1.73 m^2 Final     eGFR if non    Date Value Ref Range Status   07/27/2022 7 (A) >60 mL/min/1.73 m^2 Final     Comment:     Calculation used to obtain the estimated glomerular filtration  rate (eGFR) is the CKD-EPI equation.      07/27/2022 7 (A) >60 mL/min/1.73 m^2 Final     Comment:     Calculation used to obtain the estimated glomerular filtration  rate (eGFR) is the CKD-EPI equation.        CrCl cannot be calculated (Patient's most recent lab result is older than the maximum 7 days allowed.).  BLAISE The right ankle [DT] artery has biphasic flow.   The right ankle [DP] artery has biphasic flow.   The left ankle [PT] artery has monophasic flow.  The left ankle [DP] artery has biphasic flow.  No left arm BP taken due to dialysis access site.   US Measurements - BLAISE    Right   Right arm  mmHg         Right posterior tibial 255 mmHg         Right dorsalis pedis 255 mmHg         Right BLAISE 1.88          Right toe pressure 91 mmHg         Right TBI 0.67           Left   Left posterior tibial 255 mmHg         Left dorsalis pedis 255 mmHg         Left BLAISE 1.88          Left toe pressure 103 mmHg         Left TBI 0.76            Assessment:     1. Coronary artery disease of native artery of native heart with stable angina pectoris    2. Convulsions, unspecified convulsion type    3. Chronic pulmonary edema    4. Pulmonary hypertension    5. Aortic atherosclerosis    6. Chronic diastolic heart failure    7. Elevated troponin    8.  Hyperlipidemia associated with type 2 diabetes mellitus    9. Hypertension associated with diabetes    10. Non-rheumatic mitral regurgitation    11. Non-rheumatic tricuspid valve insufficiency    12. S/P CABG (coronary artery bypass graft)    13. ESRD (end stage renal disease)    14. Metabolic acidosis    15. Anemia in chronic kidney disease, on chronic dialysis    16. DM type 2 with diabetic peripheral neuropathy    17. Hyperparathyroidism    18. Type 2 diabetes mellitus with diabetic peripheral angiopathy without gangrene, with long-term current use of insulin    19. 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, with long-term current use of insulin    20. Vitamin D deficiency    21. Skin ulcer of toe of right foot with fat layer exposed      Cad s/p cabg has recovered well will continue exercise she is going to get a bike and start exercising  Esrd on hemodialysis no angina has hypotension needs dry weight adjustment. Nephrology addressing   Htn control low salt diet emphasized.   Diabetes on target continue compliance.  Hyperlipidemia not on target not taking her statins will resume crestor.  Pvd with toe ulcer review of nhi suggest good healing potential follow podiatry recommendation.  Plan:     Continue current therapy  Cardiac low salt diet.  Risk factor modification and excercise program.  F/u in 6 months with lipid cmp a1c.

## 2023-01-19 NOTE — ASSESSMENT & PLAN NOTE
Given patient's immunocompromised state and recurrent symptoms, amoxicillin prescribed  Rest  Drink plenty of clear fluids  Nasal saline spray to clear nasal drainage and help with nasal congestion  Mucinex or Mucinex DM for cough and chest congestion  Tylenol for fever, headache and body aches  Warm salt water gargles for throat comfort  Chloraseptic spray or lozenges for throat comfort

## 2023-01-24 ENCOUNTER — OFFICE VISIT (OUTPATIENT)
Dept: PRIMARY CARE CLINIC | Facility: CLINIC | Age: 78
End: 2023-01-24
Payer: MEDICARE

## 2023-01-24 VITALS
TEMPERATURE: 99 F | BODY MASS INDEX: 20.93 KG/M2 | WEIGHT: 122.63 LBS | SYSTOLIC BLOOD PRESSURE: 130 MMHG | DIASTOLIC BLOOD PRESSURE: 52 MMHG | HEART RATE: 69 BPM | HEIGHT: 64 IN

## 2023-01-24 DIAGNOSIS — Z99.2 ESRD (END STAGE RENAL DISEASE) ON DIALYSIS: ICD-10-CM

## 2023-01-24 DIAGNOSIS — Z00.00 ENCOUNTER FOR PREVENTIVE HEALTH EXAMINATION: Primary | ICD-10-CM

## 2023-01-24 DIAGNOSIS — N18.6 ANEMIA IN CHRONIC KIDNEY DISEASE, ON CHRONIC DIALYSIS: ICD-10-CM

## 2023-01-24 DIAGNOSIS — E11.69 HYPERLIPIDEMIA ASSOCIATED WITH TYPE 2 DIABETES MELLITUS: ICD-10-CM

## 2023-01-24 DIAGNOSIS — I27.20 PULMONARY HYPERTENSION: ICD-10-CM

## 2023-01-24 DIAGNOSIS — E11.59 HYPERTENSION ASSOCIATED WITH DIABETES: ICD-10-CM

## 2023-01-24 DIAGNOSIS — I36.1 NON-RHEUMATIC TRICUSPID VALVE INSUFFICIENCY: ICD-10-CM

## 2023-01-24 DIAGNOSIS — G81.90 HEMIPLEGIA, UNSPECIFIED ETIOLOGY, UNSPECIFIED HEMIPLEGIA TYPE, UNSPECIFIED LATERALITY: ICD-10-CM

## 2023-01-24 DIAGNOSIS — Z79.4 TYPE 2 DIABETES MELLITUS WITH DIABETIC PERIPHERAL ANGIOPATHY WITHOUT GANGRENE, WITH LONG-TERM CURRENT USE OF INSULIN: ICD-10-CM

## 2023-01-24 DIAGNOSIS — M46.96 UNSPECIFIED INFLAMMATORY SPONDYLOPATHY, LUMBAR REGION: ICD-10-CM

## 2023-01-24 DIAGNOSIS — E11.42 DM TYPE 2 WITH DIABETIC PERIPHERAL NEUROPATHY: ICD-10-CM

## 2023-01-24 DIAGNOSIS — D63.1 ANEMIA IN CHRONIC KIDNEY DISEASE, ON CHRONIC DIALYSIS: ICD-10-CM

## 2023-01-24 DIAGNOSIS — I70.0 AORTIC ATHEROSCLEROSIS: ICD-10-CM

## 2023-01-24 DIAGNOSIS — Z74.09 OTHER REDUCED MOBILITY: ICD-10-CM

## 2023-01-24 DIAGNOSIS — Z79.4 TYPE 2 DIABETES MELLITUS WITH STABLE PROLIFERATIVE RETINOPATHY OF BOTH EYES, WITH LONG-TERM CURRENT USE OF INSULIN: ICD-10-CM

## 2023-01-24 DIAGNOSIS — I65.23 BILATERAL CAROTID ARTERY STENOSIS: ICD-10-CM

## 2023-01-24 DIAGNOSIS — I50.42 CHRONIC COMBINED SYSTOLIC AND DIASTOLIC HEART FAILURE: ICD-10-CM

## 2023-01-24 DIAGNOSIS — E11.3553 TYPE 2 DIABETES MELLITUS WITH STABLE PROLIFERATIVE RETINOPATHY OF BOTH EYES, WITH LONG-TERM CURRENT USE OF INSULIN: ICD-10-CM

## 2023-01-24 DIAGNOSIS — N18.6 ESRD (END STAGE RENAL DISEASE) ON DIALYSIS: ICD-10-CM

## 2023-01-24 DIAGNOSIS — L97.512 SKIN ULCER OF TOE OF RIGHT FOOT WITH FAT LAYER EXPOSED: ICD-10-CM

## 2023-01-24 DIAGNOSIS — Z99.2 ANEMIA IN CHRONIC KIDNEY DISEASE, ON CHRONIC DIALYSIS: ICD-10-CM

## 2023-01-24 DIAGNOSIS — I15.2 HYPERTENSION ASSOCIATED WITH DIABETES: ICD-10-CM

## 2023-01-24 DIAGNOSIS — E11.51 TYPE 2 DIABETES MELLITUS WITH DIABETIC PERIPHERAL ANGIOPATHY WITHOUT GANGRENE, WITH LONG-TERM CURRENT USE OF INSULIN: ICD-10-CM

## 2023-01-24 DIAGNOSIS — I25.118 CORONARY ARTERY DISEASE OF NATIVE ARTERY OF NATIVE HEART WITH STABLE ANGINA PECTORIS: ICD-10-CM

## 2023-01-24 DIAGNOSIS — E21.3 HYPERPARATHYROIDISM: ICD-10-CM

## 2023-01-24 DIAGNOSIS — H35.3221 EXUDATIVE AGE-RELATED MACULAR DEGENERATION OF LEFT EYE WITH ACTIVE CHOROIDAL NEOVASCULARIZATION: ICD-10-CM

## 2023-01-24 DIAGNOSIS — T82.898A OTHER SPECIFIED COMPLICATION OF VASCULAR PROSTHETIC DEVICES, IMPLANTS AND GRAFTS, INITIAL ENCOUNTER: ICD-10-CM

## 2023-01-24 DIAGNOSIS — I34.0 NON-RHEUMATIC MITRAL REGURGITATION: ICD-10-CM

## 2023-01-24 DIAGNOSIS — E78.5 HYPERLIPIDEMIA ASSOCIATED WITH TYPE 2 DIABETES MELLITUS: ICD-10-CM

## 2023-01-24 PROBLEM — R79.89 ELEVATED TROPONIN: Status: RESOLVED | Noted: 2021-12-06 | Resolved: 2023-01-24

## 2023-01-24 PROBLEM — R09.82 POST-NASAL DRIP: Status: RESOLVED | Noted: 2023-01-17 | Resolved: 2023-01-24

## 2023-01-24 PROBLEM — J01.90 ACUTE NON-RECURRENT SINUSITIS: Status: RESOLVED | Noted: 2023-01-17 | Resolved: 2023-01-24

## 2023-01-24 PROBLEM — Z53.21 PATIENT LEFT WITHOUT BEING SEEN: Status: RESOLVED | Noted: 2022-05-04 | Resolved: 2023-01-24

## 2023-01-24 PROBLEM — J81.1 PULMONARY EDEMA: Status: RESOLVED | Noted: 2020-07-01 | Resolved: 2023-01-24

## 2023-01-24 PROCEDURE — 3288F FALL RISK ASSESSMENT DOCD: CPT | Mod: HCNC,CPTII,S$GLB, | Performed by: NURSE PRACTITIONER

## 2023-01-24 PROCEDURE — 1101F PR PT FALLS ASSESS DOC 0-1 FALLS W/OUT INJ PAST YR: ICD-10-PCS | Mod: HCNC,CPTII,S$GLB, | Performed by: NURSE PRACTITIONER

## 2023-01-24 PROCEDURE — G9919 SCRN ND POS ND PROV OF REC: HCPCS | Mod: HCNC,CPTII,S$GLB, | Performed by: NURSE PRACTITIONER

## 2023-01-24 PROCEDURE — 1159F MED LIST DOCD IN RCRD: CPT | Mod: HCNC,CPTII,S$GLB, | Performed by: NURSE PRACTITIONER

## 2023-01-24 PROCEDURE — 1160F RVW MEDS BY RX/DR IN RCRD: CPT | Mod: HCNC,CPTII,S$GLB, | Performed by: NURSE PRACTITIONER

## 2023-01-24 PROCEDURE — G9919 PR SCREENING AND POSITIVE: ICD-10-PCS | Mod: HCNC,CPTII,S$GLB, | Performed by: NURSE PRACTITIONER

## 2023-01-24 PROCEDURE — 3075F SYST BP GE 130 - 139MM HG: CPT | Mod: HCNC,CPTII,S$GLB, | Performed by: NURSE PRACTITIONER

## 2023-01-24 PROCEDURE — 99999 PR PBB SHADOW E&M-EST. PATIENT-LVL III: ICD-10-PCS | Mod: PBBFAC,HCNC,, | Performed by: NURSE PRACTITIONER

## 2023-01-24 PROCEDURE — 99215 PR OFFICE/OUTPT VISIT, EST, LEVL V, 40-54 MIN: ICD-10-PCS | Mod: HCNC,S$GLB,, | Performed by: NURSE PRACTITIONER

## 2023-01-24 PROCEDURE — 1157F ADVNC CARE PLAN IN RCRD: CPT | Mod: HCNC,CPTII,S$GLB, | Performed by: NURSE PRACTITIONER

## 2023-01-24 PROCEDURE — 1170F FXNL STATUS ASSESSED: CPT | Mod: HCNC,CPTII,S$GLB, | Performed by: NURSE PRACTITIONER

## 2023-01-24 PROCEDURE — 1159F PR MEDICATION LIST DOCUMENTED IN MEDICAL RECORD: ICD-10-PCS | Mod: HCNC,CPTII,S$GLB, | Performed by: NURSE PRACTITIONER

## 2023-01-24 PROCEDURE — 3078F PR MOST RECENT DIASTOLIC BLOOD PRESSURE < 80 MM HG: ICD-10-PCS | Mod: HCNC,CPTII,S$GLB, | Performed by: NURSE PRACTITIONER

## 2023-01-24 PROCEDURE — 3288F PR FALLS RISK ASSESSMENT DOCUMENTED: ICD-10-PCS | Mod: HCNC,CPTII,S$GLB, | Performed by: NURSE PRACTITIONER

## 2023-01-24 PROCEDURE — 99999 PR PBB SHADOW E&M-EST. PATIENT-LVL III: CPT | Mod: PBBFAC,HCNC,, | Performed by: NURSE PRACTITIONER

## 2023-01-24 PROCEDURE — 1170F PR FUNCTIONAL STATUS ASSESSED: ICD-10-PCS | Mod: HCNC,CPTII,S$GLB, | Performed by: NURSE PRACTITIONER

## 2023-01-24 PROCEDURE — 1157F PR ADVANCE CARE PLAN OR EQUIV PRESENT IN MEDICAL RECORD: ICD-10-PCS | Mod: HCNC,CPTII,S$GLB, | Performed by: NURSE PRACTITIONER

## 2023-01-24 PROCEDURE — 99417 PROLNG OP E/M EACH 15 MIN: CPT | Mod: HCNC,S$GLB,, | Performed by: NURSE PRACTITIONER

## 2023-01-24 PROCEDURE — 99215 OFFICE O/P EST HI 40 MIN: CPT | Mod: HCNC,S$GLB,, | Performed by: NURSE PRACTITIONER

## 2023-01-24 PROCEDURE — 1160F PR REVIEW ALL MEDS BY PRESCRIBER/CLIN PHARMACIST DOCUMENTED: ICD-10-PCS | Mod: HCNC,CPTII,S$GLB, | Performed by: NURSE PRACTITIONER

## 2023-01-24 PROCEDURE — 1101F PT FALLS ASSESS-DOCD LE1/YR: CPT | Mod: HCNC,CPTII,S$GLB, | Performed by: NURSE PRACTITIONER

## 2023-01-24 PROCEDURE — 99499 UNLISTED E&M SERVICE: CPT | Mod: HCNC,S$GLB,, | Performed by: NURSE PRACTITIONER

## 2023-01-24 PROCEDURE — 3075F PR MOST RECENT SYSTOLIC BLOOD PRESS GE 130-139MM HG: ICD-10-PCS | Mod: HCNC,CPTII,S$GLB, | Performed by: NURSE PRACTITIONER

## 2023-01-24 PROCEDURE — 99499 RISK ADDL DX/OHS AUDIT: ICD-10-PCS | Mod: HCNC,S$GLB,, | Performed by: NURSE PRACTITIONER

## 2023-01-24 PROCEDURE — 99417 PR PROLONGED SVC, OUTPT, W/WO DIRECT PT CONTACT,  EA ADDTL 15 MIN: ICD-10-PCS | Mod: HCNC,S$GLB,, | Performed by: NURSE PRACTITIONER

## 2023-01-24 PROCEDURE — 3078F DIAST BP <80 MM HG: CPT | Mod: HCNC,CPTII,S$GLB, | Performed by: NURSE PRACTITIONER

## 2023-01-24 RX ORDER — INSULIN HUMAN 100 [IU]/ML
INJECTION, SUSPENSION SUBCUTANEOUS
Qty: 15 ML | Refills: 3 | Status: SHIPPED | OUTPATIENT
Start: 2023-01-24 | End: 2023-01-27

## 2023-01-25 PROBLEM — Z00.00 ENCOUNTER FOR PREVENTIVE HEALTH EXAMINATION: Status: ACTIVE | Noted: 2023-01-25

## 2023-01-25 PROBLEM — Z74.09 OTHER REDUCED MOBILITY: Status: ACTIVE | Noted: 2023-01-25

## 2023-01-25 NOTE — PATIENT INSTRUCTIONS
Counseling and Referral of Other Preventative  (Italic type indicates deductible and co-insurance are waived)    Patient Name: Avril Monzon  Today's Date: 1/25/2023    Health Maintenance       Date Due Completion Date    Shingles Vaccine (1 of 2) Never done ---    DEXA Scan Never done ---    Colorectal Cancer Screening Never done ---    COVID-19 Vaccine (4 - Booster for Moderna series) 02/25/2022 12/31/2021    Foot Exam 03/08/2023 3/8/2022    Override on 3/11/2021: Done    Override on 5/6/2019: Done    Override on 1/27/2016: Done    Override on 8/21/2014: Done    Override on 3/26/2014: Done    Override on 1/21/2014: Done    Hemoglobin A1c 03/13/2023 9/13/2022    Lipid Panel 07/13/2023 7/13/2022    Eye Exam 09/01/2023 9/1/2022    Override on 4/22/2016: Done    Override on 9/1/2013: Done    TETANUS VACCINE 12/12/2031 12/12/2021        No orders of the defined types were placed in this encounter.    The following information is provided to all patients.  This information is to help you find resources for any of the problems found today that may be affecting your health:                Living healthy guide: www.[facility].[place].gov      Understanding Diabetes: www.diabetes.org      Eating healthy: www.cdc.gov/healthyweight      CDC home safety checklist: www.cdc.gov/steadi/patient.html      Agency on Aging: www.goea.[place].[facility]      Alcoholics anonymous (AA): www.aa.org      Physical Activity: www.pawan.nih.gov/vt0stzq      Tobacco use: www.quitwithusla.org

## 2023-01-25 NOTE — PROGRESS NOTES
"  Avril Monzon presented for a follow-up Medicare AWV today. The following components were reviewed and updated:    Medical history  Family History  Social history  Allergies and Current Medications  Health Risk Assessment  Health Maintenance  Care Team    **See Completed Assessments for Annual Wellness visit with in the encounter summary    The following assessments were completed:  Depression Screening  Cognitive function Screening  Timed Get Up Test  Whisper Test  Nutrition Screening  PAQ      Vitals:    01/24/23 1406   BP: (!) 130/52   Pulse: 69   Temp: 98.8 °F (37.1 °C)   Weight: 55.6 kg (122 lb 9.6 oz)   Height: 5' 4" (1.626 m)     Body mass index is 21.04 kg/m².         Review of Systems   Constitutional:  Negative for activity change, appetite change, fatigue and fever.   HENT:  Positive for postnasal drip. Negative for ear discharge, ear pain, rhinorrhea, sinus pressure/congestion, sore throat and trouble swallowing.    Respiratory:  Positive for shortness of breath. Negative for cough.    Cardiovascular:  Negative for chest pain, palpitations and leg swelling.   Gastrointestinal:  Negative for abdominal pain, constipation, diarrhea, nausea and vomiting.   Genitourinary:  Positive for decreased urine volume.        Hemodialysis patient   Musculoskeletal:  Positive for back pain and myalgias. Negative for gait problem.   Integumentary:  Negative.   Neurological:  Negative for dizziness, seizures, syncope, weakness, light-headedness and headaches.   Psychiatric/Behavioral: Negative.      Physical Exam  Vitals reviewed.   Constitutional:       General: She is not in acute distress.     Appearance: Normal appearance. She is well-developed.   HENT:      Head: Normocephalic and atraumatic.      Right Ear: Tympanic membrane normal.      Left Ear: Tympanic membrane normal.      Nose: Nose normal.      Mouth/Throat:      Mouth: Mucous membranes are moist.   Eyes:      General: No scleral icterus.     " Conjunctiva/sclera: Conjunctivae normal.      Pupils: Pupils are equal, round, and reactive to light.   Cardiovascular:      Rate and Rhythm: Normal rate and regular rhythm.      Heart sounds: Murmur heard.   Pulmonary:      Effort: Pulmonary effort is normal.      Breath sounds: Normal breath sounds.   Abdominal:      General: Bowel sounds are normal. There is no distension.      Palpations: Abdomen is soft.      Tenderness: There is no abdominal tenderness.   Musculoskeletal:         General: No tenderness. Normal range of motion.      Cervical back: Normal range of motion and neck supple.      Right lower leg: No edema.      Left lower leg: No edema.   Skin:     General: Skin is warm and dry.   Neurological:      General: No focal deficit present.      Mental Status: She is alert and oriented to person, place, and time.   Psychiatric:         Behavior: Behavior normal.         Thought Content: Thought content normal.          Health Maintenance         Date Due Completion Date    Shingles Vaccine (1 of 2) Never done ---    DEXA Scan Never done ---    Colorectal Cancer Screening Never done ---    COVID-19 Vaccine (4 - Booster for Moderna series) 02/25/2022 12/31/2021    Foot Exam 03/08/2023 3/8/2022    Override on 3/11/2021: Done    Override on 5/6/2019: Done    Override on 1/27/2016: Done    Override on 8/21/2014: Done    Override on 3/26/2014: Done    Override on 1/21/2014: Done    Hemoglobin A1c 03/13/2023 9/13/2022    Lipid Panel 07/13/2023 7/13/2022    Eye Exam 09/01/2023 9/1/2022    Override on 4/22/2016: Done    Override on 9/1/2013: Done    TETANUS VACCINE 12/12/2031 12/12/2021              PROBLEM LIST ITEMS ADDRESSED THIS VISIT:    1. Encounter for preventive health examination  Assessment & Plan:  Still needing Shingles vaccine and COVID catch up for boosters  Pending Colonoscopy and Bone Scan  Follow up with PCP    Review for Opioid Screening: Pt does not have Rx for Opioids      Review for Substance  Use Disorders: Patient does not use illicit substances as reported       2. 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, with long-term current use of insulin  Assessment & Plan:  8/2022 - Dr. Coello  Stable and ongoing  DM SINCE 1995   PDR / DME  S/P LASER AND INJECTIONS OS(DR BOX AND AT [facility])   2.) NLP OD SINCE BIRTH   3.) DENSE CAT OD / 2+NS OS   4.) Asteroid Hyalosis   5.)iop 42 od 11/5/18   Avastin os 3/15/19   EYLEA OS LAST 4/29/2021         Orders:  -     insulin NPH/Reg human (HUMULIN 70/30 U-100 KWIKPEN) 100 unit/mL (70-30) InPn pen; Administer 20 units before breakfast and 8 units before dinner. On dialysis days, 10 units before dinner.  Dispense: 15 mL; Refill: 3    3. Unspecified inflammatory spondylopathy, lumbar region  Assessment & Plan:  Chronic and ongoing  Continue follow up by Pain MD.         4. Hemiplegia, unspecified etiology, unspecified hemiplegia type, unspecified laterality  Assessment & Plan:  Chronic, Stable  Pt with hx of CVA  Control blood glucose and blood pressures  Continue ASA and STATIN  Avoid further smoking  Only mild functional deficit  Follow up with PCP      5. Exudative age-related macular degeneration of left eye with active choroidal neovascularization  Assessment & Plan:  Chronic and stable - last eye exam 8/22   followed by Dr. Coello      6. Pulmonary hypertension  Assessment & Plan:  7/2022 - The estimated PA systolic pressure is greater than 73 mmHg - per heart cath   Chronic and ongoing   Follow with Cardiology        7. Chronic combined systolic and diastolic heart failure  Assessment & Plan:  Stable, currently compensated  7/2022 The ejection fraction was calculated to be 40%.  Continue dialysis for ultrafiltration  Monitor fluid intake and Na intake  Follow up with Cardiology and Nephrology          8. Non-rheumatic tricuspid valve insufficiency  Assessment & Plan:  Noted on ECHO 7/2022  Continue control of HTN,  diabetes  Compliance with antihypertensives and STATIN  Follow up with Cardiology      9. Non-rheumatic mitral regurgitation  Assessment & Plan:  12/21 - per ECHO Moderate mitral regurgitation  Chronic and ongoing  Follow up with Cardiology        10. Coronary artery disease of native artery of native heart with stable angina pectoris  Assessment & Plan:  7/22 - 3 vessel disease  The Prox RCA to Mid RCA lesion was 100% stenosed.  The Ost Cx to Prox Cx lesion was 99% stenosed.  The Prox LAD to Mid LAD lesion was 75% stenosed.  S/P CABG 7/20/22 -   Continue STATIN, ARB, ASA, lopressor and control of diabetes  Follow up with Cardiology      11. Hyperlipidemia associated with type 2 diabetes mellitus  Assessment & Plan:   Latest Reference Range & Units Most Recent   Cholesterol 120 - 199 mg/dL 171  7/13/22 02:00   HDL 40 - 75 mg/dL 59  7/13/22 02:00   HDL/Cholesterol Ratio 20.0 - 50.0 % 34.5  7/13/22 02:00   LDL Cholesterol External 63.0 - 159.0 mg/dL 102.8  7/13/22 02:00   Non-HDL Cholesterol mg/dL 112  7/13/22 02:00   Total Cholesterol/HDL Ratio 2.0 - 5.0  2.9  7/13/22 02:00   Triglycerides 30 - 150 mg/dL 46  7/13/22 02:00   Continue STATIN  Chronic and ongoing  Follow up with Cardiology      12. Aortic atherosclerosis  Overview:  CXR 9/09/14 - Heart size is borderline enlarged with atherosclerotic calcification of the aorta.  CXR 5/4/2017: Findings: The lungs are clear and free of infiltrate.  No pleural effusion or pneumothorax is identified.  The heart is mildly enlarged.  There is calcification of the aortic knob.    Assessment & Plan:  CXR 9/09/14 - Heart size is borderline enlarged with atherosclerotic calcification of the aorta.  CXR 5/4/2017: Findings: The lungs are clear and free of infiltrate.  No pleural effusion or pneumothorax is identified.  The heart is mildly enlarged.  There is calcification of the aortic knob.  Chronic, ongoing  Continue STATIN and ASA  F/U with Cardiology      13. Bilateral carotid  artery stenosis  Overview:  Carotid Ultrasound - 2/18/14Plaque bilaterally without elevation of the acquired Doppler measurements to indicate hemodynamically significant stenosis on either side.    Assessment & Plan:  Chronic and ongoing  Continue STATIN  Follow with Vascular      14. Hypertension associated with diabetes  Assessment & Plan:  Controlled at time of visit  Monitor fluid intake  Continue telmisartan and lopressor  Better control of diabetes  Follow up with PCP and Cardiologist      15. Skin ulcer of toe of right foot with fat layer exposed  Assessment & Plan:  Improving, pt applies topical betadine  Follow up with Podiatry  Per Cardiology - adequate blood flow for wound healing      16. Other specified complication of vascular prosthetic devices, implants and grafts, initial encounter  Assessment & Plan:  Chronic and ongoing  Left upper arm dialysis access  + thrill, + bruit  F/U with Nephrology or Vascular      17. Type 2 diabetes mellitus with diabetic peripheral angiopathy without gangrene, with long-term current use of insulin  Assessment & Plan:  Chronic and stable  Describes intermittent numbness to toes  Control blood glucose  Continue the Humulin 70/30  Follow up with PCP  Last Hgb A1 C = 9.3 %      18. Hyperparathyroidism  Assessment & Plan:  Chronic and ongoing, Follow up with Nephrologist      19. DM type 2 with diabetic peripheral neuropathy  Assessment & Plan:  Chronic and stable  Describes intermittent numbness to toes  Control blood glucose  Continue the Humulin 70/30  Follow up with PCP  Last Hgb A1 C = 9.3 %        20. Anemia in chronic kidney disease, on chronic dialysis  Assessment & Plan:   Latest Reference Range & Units Most Recent   Hemoglobin 12.0 - 16.0 g/dL 7.5 (L)  8/26/22 12:22   Hematocrit 37.0 - 48.5 % 23.8 (L)  8/26/22 12:22     Pt denies any active bleeding  Epogen during dialysis  Follow up with Nephrology  Asymptomatic currently        21. ESRD (end stage renal disease)  on dialysis  Overview:  Hx of unctontrolled HTN and DMII : Admission records reviewed from [facility] in Jan 2014 and March 2014 ; Creatinine was 1.4 in Jan 2014 and had proteinuria c/w HTN and DMII ; since then progressive decline in GFR with UTI from E.Coli ; USD in 9/2014 show chronic Medical Disease     Assessment & Plan:  Due to uncontrolled HTN and DM II  Chronic and ongoing - scheduled dialysis on M,W,F  Follow up with Nephrology      22. Other reduced mobility  Has not fallen recently  Slow pace of ambulation but no significant functional deficits  Defer PT/OT eval at this time  Follow up with PCP                 Provided Avril with a 5-10 year written screening schedule and personal prevention plan. Recommendations were developed using the USPSTF age appropriate recommendations. Education, counseling, and referrals were provided as needed.  After Visit Summary printed and given to patient which includes a list of additional screenings\tests needed.    Future Appointments   Date Time Provider Department Center   1/26/2023 11:30 AM Alina Bauer DPM [facility] POD [facility]   2/7/2023  2:30 PM Conchita Garcia RD, CDE [facility] DIBEDU [facility]   2/14/2023  2:20 PM Ciarra Husain MD [facility] 67[facility]    3/3/2023 10:00 AM JADIEL Coello MD [facility] OPHTHAL [facility]   4/4/2023 10:00 AM LABORATORY, [facility] LAB [facility]   4/12/2023 11:30 AM Natty Mistry NP [facility] DIABETE [facility]   8/1/2023  8:00 AM LABORATORY, [facility]   8/8/2023  1:00 PM Michelle Holman MD [facility] CARDIO [facility]              Nidhi Miller, APRN, NP-C [facility] [address] [place] [address]                         I offered to discuss advanced care planning, including how to pick a person who would make decisions for you if you were unable to make them for yourself, called a health care power of , and what kind of decisions you might make such as use of life sustaining  treatments such as ventilators and tube feeding when faced with a life limiting illness recorded on a living will that they will need to know. (How you want to be cared for as you near the end of your natural life)     X Patient is interested in learning more about how to make advanced directives.  I provided them paperwork and offered to discuss this with them.

## 2023-01-25 NOTE — ASSESSMENT & PLAN NOTE
Chronic and ongoing  10/22 BLAISE suggest good healing potential - adequate arterial blood flow  Better control of blood glucose and lower Hgb A1 C  Continue ASA and STATIN

## 2023-01-25 NOTE — ASSESSMENT & PLAN NOTE
Chronic and ongoing  Left upper arm dialysis access  + thrill, + bruit  F/U with Nephrology or Vascular

## 2023-01-25 NOTE — ASSESSMENT & PLAN NOTE
7/22 - 3 vessel disease   The Prox RCA to Mid RCA lesion was 100% stenosed.   The Ost Cx to Prox Cx lesion was 99% stenosed.   The Prox LAD to Mid LAD lesion was 75% stenosed.  S/P CABG 7/20/22 -   Continue STATIN, ARB, ASA, lopressor and control of diabetes  Follow up with Cardiology

## 2023-01-25 NOTE — ASSESSMENT & PLAN NOTE
Due to uncontrolled HTN and DM II  Chronic and ongoing - scheduled dialysis on M,W,F  Follow up with Nephrology

## 2023-01-25 NOTE — ASSESSMENT & PLAN NOTE
Chronic, Stable  Pt with hx of CVA  Control blood glucose and blood pressures  Continue ASA and STATIN  Avoid further smoking  Only mild functional deficit  Follow up with PCP

## 2023-01-25 NOTE — ASSESSMENT & PLAN NOTE
7/2022 - The estimated PA systolic pressure is greater than 73 mmHg - per heart cath   Chronic and ongoing   Follow with Cardiology

## 2023-01-25 NOTE — ASSESSMENT & PLAN NOTE
Still needing Shingles vaccine and COVID catch up for boosters  Pending Colonoscopy and Bone Scan  Follow up with PCP    Review for Opioid Screening: Pt does not have Rx for Opioids      Review for Substance Use Disorders: Patient does not use illicit substances as reported

## 2023-01-25 NOTE — ASSESSMENT & PLAN NOTE
Controlled at time of visit  Monitor fluid intake  Continue telmisartan and lopressor  Better control of diabetes  Follow up with PCP and Cardiologist

## 2023-01-25 NOTE — ASSESSMENT & PLAN NOTE
Improving, pt applies topical betadine  Follow up with Podiatry  Per Cardiology - adequate blood flow for wound healing

## 2023-01-25 NOTE — ASSESSMENT & PLAN NOTE
Chronic and stable  Describes intermittent numbness to toes  Control blood glucose  Continue the Humulin 70/30  Follow up with PCP  Last Hgb A1 C = 9.3 %

## 2023-01-25 NOTE — ASSESSMENT & PLAN NOTE
8/2022 - Dr. Coello  Stable and ongoing  DM SINCE 1995   PDR / DME  S/P LASER AND INJECTIONS OS(DR BOX AND AT [facility])   2.) NLP OD SINCE BIRTH   3.) DENSE CAT OD / 2+NS OS   4.) Asteroid Hyalosis   5.)iop 42 od 11/5/18   Avastin os 3/15/19   EYLEA OS LAST 4/29/2021

## 2023-01-25 NOTE — ASSESSMENT & PLAN NOTE
CXR 9/09/14 - Heart size is borderline enlarged with atherosclerotic calcification of the aorta.  CXR 5/4/2017: Findings: The lungs are clear and free of infiltrate.  No pleural effusion or pneumothorax is identified.  The heart is mildly enlarged.  There is calcification of the aortic knob.  Chronic, ongoing  Continue STATIN and ASA  F/U with Cardiology

## 2023-01-25 NOTE — ASSESSMENT & PLAN NOTE
Latest Reference Range & Units Most Recent   Hemoglobin 12.0 - 16.0 g/dL 7.5 (L)  8/26/22 12:22   Hematocrit 37.0 - 48.5 % 23.8 (L)  8/26/22 12:22     Pt denies any active bleeding  Epogen during dialysis  Follow up with Nephrology  Asymptomatic currently

## 2023-01-25 NOTE — ASSESSMENT & PLAN NOTE
Latest Reference Range & Units Most Recent   Cholesterol 120 - 199 mg/dL 171  7/13/22 02:00   HDL 40 - 75 mg/dL 59  7/13/22 02:00   HDL/Cholesterol Ratio 20.0 - 50.0 % 34.5  7/13/22 02:00   LDL Cholesterol External 63.0 - 159.0 mg/dL 102.8  7/13/22 02:00   Non-HDL Cholesterol mg/dL 112  7/13/22 02:00   Total Cholesterol/HDL Ratio 2.0 - 5.0  2.9  7/13/22 02:00   Triglycerides 30 - 150 mg/dL 46  7/13/22 02:00   Continue STATIN  Chronic and ongoing  Follow up with Cardiology

## 2023-01-25 NOTE — ASSESSMENT & PLAN NOTE
Noted on ECHO 7/2022  Continue control of HTN, diabetes  Compliance with antihypertensives and STATIN  Follow up with Cardiology

## 2023-01-25 NOTE — ASSESSMENT & PLAN NOTE
Stable, currently compensated  7/2022 The ejection fraction was calculated to be 40%.  Continue dialysis for ultrafiltration  Monitor fluid intake and Na intake  Follow up with Cardiology and Nephrology

## 2023-01-27 ENCOUNTER — TELEPHONE (OUTPATIENT)
Dept: PRIMARY CARE CLINIC | Facility: CLINIC | Age: 78
End: 2023-01-27
Payer: MEDICARE

## 2023-01-27 DIAGNOSIS — E11.3553 TYPE 2 DIABETES MELLITUS WITH STABLE PROLIFERATIVE RETINOPATHY OF BOTH EYES, WITH LONG-TERM CURRENT USE OF INSULIN: Primary | ICD-10-CM

## 2023-01-27 DIAGNOSIS — Z79.4 TYPE 2 DIABETES MELLITUS WITH STABLE PROLIFERATIVE RETINOPATHY OF BOTH EYES, WITH LONG-TERM CURRENT USE OF INSULIN: Primary | ICD-10-CM

## 2023-01-27 RX ORDER — INSULIN ASPART 100 [IU]/ML
INJECTION, SUSPENSION SUBCUTANEOUS
Qty: 15 ML | Refills: 3 | Status: SHIPPED | OUTPATIENT
Start: 2023-01-27 | End: 2023-08-15

## 2023-01-27 NOTE — TELEPHONE ENCOUNTER
Advised daughter that Humulin 70/30 insulin was changed to Novolog 70/30 insulin due for need of prior authorization. Advised to  at designated [facility] pharmacy.     She verbalized understanding.

## 2023-01-27 NOTE — TELEPHONE ENCOUNTER
Humulin 70/30 insulin required PA. Changed insulin to Novolog 70/30 flex pen that did not require prior authorization. Prescription sent to designated pharmacy.

## 2023-01-31 ENCOUNTER — OFFICE VISIT (OUTPATIENT)
Dept: PODIATRY | Facility: CLINIC | Age: 78
End: 2023-01-31
Payer: MEDICARE

## 2023-01-31 VITALS — HEIGHT: 64 IN | WEIGHT: 122.56 LBS | BODY MASS INDEX: 20.92 KG/M2

## 2023-01-31 DIAGNOSIS — I73.9 PERIPHERAL ARTERIAL DISEASE: Primary | ICD-10-CM

## 2023-01-31 DIAGNOSIS — E11.42 CONTROLLED TYPE 2 DIABETES MELLITUS WITH DIABETIC POLYNEUROPATHY, WITH LONG-TERM CURRENT USE OF INSULIN: ICD-10-CM

## 2023-01-31 DIAGNOSIS — Z79.4 CONTROLLED TYPE 2 DIABETES MELLITUS WITH DIABETIC POLYNEUROPATHY, WITH LONG-TERM CURRENT USE OF INSULIN: ICD-10-CM

## 2023-01-31 DIAGNOSIS — Z87.2 HISTORY OF FOOT ULCER: ICD-10-CM

## 2023-01-31 PROCEDURE — 1159F PR MEDICATION LIST DOCUMENTED IN MEDICAL RECORD: ICD-10-PCS | Mod: HCNC,CPTII,S$GLB, | Performed by: PODIATRIST

## 2023-01-31 PROCEDURE — 1160F RVW MEDS BY RX/DR IN RCRD: CPT | Mod: HCNC,CPTII,S$GLB, | Performed by: PODIATRIST

## 2023-01-31 PROCEDURE — 99213 PR OFFICE/OUTPT VISIT, EST, LEVL III, 20-29 MIN: ICD-10-PCS | Mod: HCNC,S$GLB,, | Performed by: PODIATRIST

## 2023-01-31 PROCEDURE — 1157F ADVNC CARE PLAN IN RCRD: CPT | Mod: HCNC,CPTII,S$GLB, | Performed by: PODIATRIST

## 2023-01-31 PROCEDURE — 3288F FALL RISK ASSESSMENT DOCD: CPT | Mod: HCNC,CPTII,S$GLB, | Performed by: PODIATRIST

## 2023-01-31 PROCEDURE — 99999 PR PBB SHADOW E&M-EST. PATIENT-LVL III: CPT | Mod: PBBFAC,HCNC,, | Performed by: PODIATRIST

## 2023-01-31 PROCEDURE — 1101F PR PT FALLS ASSESS DOC 0-1 FALLS W/OUT INJ PAST YR: ICD-10-PCS | Mod: HCNC,CPTII,S$GLB, | Performed by: PODIATRIST

## 2023-01-31 PROCEDURE — 1101F PT FALLS ASSESS-DOCD LE1/YR: CPT | Mod: HCNC,CPTII,S$GLB, | Performed by: PODIATRIST

## 2023-01-31 PROCEDURE — 99213 OFFICE O/P EST LOW 20 MIN: CPT | Mod: HCNC,S$GLB,, | Performed by: PODIATRIST

## 2023-01-31 PROCEDURE — 1160F PR REVIEW ALL MEDS BY PRESCRIBER/CLIN PHARMACIST DOCUMENTED: ICD-10-PCS | Mod: HCNC,CPTII,S$GLB, | Performed by: PODIATRIST

## 2023-01-31 PROCEDURE — 1126F AMNT PAIN NOTED NONE PRSNT: CPT | Mod: HCNC,CPTII,S$GLB, | Performed by: PODIATRIST

## 2023-01-31 PROCEDURE — 1157F PR ADVANCE CARE PLAN OR EQUIV PRESENT IN MEDICAL RECORD: ICD-10-PCS | Mod: HCNC,CPTII,S$GLB, | Performed by: PODIATRIST

## 2023-01-31 PROCEDURE — 3288F PR FALLS RISK ASSESSMENT DOCUMENTED: ICD-10-PCS | Mod: HCNC,CPTII,S$GLB, | Performed by: PODIATRIST

## 2023-01-31 PROCEDURE — 1159F MED LIST DOCD IN RCRD: CPT | Mod: HCNC,CPTII,S$GLB, | Performed by: PODIATRIST

## 2023-01-31 PROCEDURE — 99999 PR PBB SHADOW E&M-EST. PATIENT-LVL III: ICD-10-PCS | Mod: PBBFAC,HCNC,, | Performed by: PODIATRIST

## 2023-01-31 PROCEDURE — 1126F PR PAIN SEVERITY QUANTIFIED, NO PAIN PRESENT: ICD-10-PCS | Mod: HCNC,CPTII,S$GLB, | Performed by: PODIATRIST

## 2023-01-31 NOTE — PROGRESS NOTES
Subjective:       Patient ID: Avril Monzon is a 77 y.o. female.    Chief Complaint: Follow-up (Coming in for f/u, rates pain 0/10, diabetic, wearing socks and shoes, last seen PCP Dr. Germain on 10/13/2022.)      HPI: Avril Monzon presents to the office today with a wound present to the distal aspect of the right great toe.  Reports 0/10 pain.  Continues with Betadine dressings.   No evidence of drainage.    Review of patient's allergies indicates:   Allergen Reactions    Codeine Swelling    Darvon [propoxyphene] Swelling    Atorvastatin      Muscle twitching       Past Medical History:   Diagnosis Date    Acute hypoxemic respiratory failure 11/26/2018    Anemia     Arthritis     back, hand, knees    Back pain     Cataract     CKD stage G4/A3, GFR 15 - 29 and albumin creatinine ratio >300 mg/g     GFR 40% Jan 2014 and 33% ub 3/2014 ([facility])    Coronary artery disease involving native coronary artery of native heart 11/30/2018    Diabetic retinopathy     DM (diabetes mellitus) type II controlled, neurological manifestation     Exudative age-related macular degeneration of left eye with active choroidal neovascularization 2/5/2019    GERD (gastroesophageal reflux disease)     Glaucoma     Heart failure     Hyperlipidemia     Hypertension     Non-ST elevation MI (NSTEMI) 11/28/2018    NSTEMI (non-ST elevated myocardial infarction) 11/27/2018    Peripheral neuropathy     Polyneuropathy     Proteinuria     >6 mo     Seizures     [facility]G 1/2014 -dick CT NRI showed small vessel    Skin ulcer of toe of right foot with fat layer exposed 10/14/2022    Stroke 2012,2014    Tobacco dependence     Type 2 diabetes with peripheral circulatory disorder, controlled        Family History   Problem Relation Age of Onset    Diabetes Mother     Hyperlipidemia Mother     Hypertension Mother     Heart disease Mother         MI    Muscular dystrophy Son     Cancer Maternal Grandmother         colon       Social History     Socioeconomic  History    Marital status:    Tobacco Use    Smoking status: Former     Packs/day: 1.50     Years: 30.00     Pack years: 45.00     Types: Cigarettes     Quit date: 1990     Years since quittin.0    Smokeless tobacco: Former   Substance and Sexual Activity    Alcohol use: No     Alcohol/week: 0.0 standard drinks    Drug use: No    Sexual activity: Not Currently     Social Determinants of Health     Financial Resource Strain: Low Risk     Difficulty of Paying Living Expenses: Not very hard   Food Insecurity: No Food Insecurity    Worried About Running Out of Food in the Last Year: Never true    Ran Out of Food in the Last Year: Never true   Transportation Needs: Unmet Transportation Needs    Lack of Transportation (Medical): Yes    Lack of Transportation (Non-Medical): No   Physical Activity: Inactive    Days of Exercise per Week: 0 days    Minutes of Exercise per Session: 0 min   Stress: Stress Concern Present    Feeling of Stress : To some extent   Social Connections: Moderately Integrated    Frequency of Communication with Friends and Family: More than three times a week    Frequency of Social Gatherings with Friends and Family: More than three times a week    Attends Confucianist Services: More than 4 times per year    Active Member of Clubs or Organizations: No    Attends Club or Organization Meetings: Never    Marital Status:    Housing Stability: Low Risk     Unable to Pay for Housing in the Last Year: No    Number of Places Lived in the Last Year: 1    Unstable Housing in the Last Year: No       Past Surgical History:   Procedure Laterality Date    BREAST LUMPECTOMY Left     benign, when patient was 15 years old    BREAST MASS EXCISION      ,benign, age 15.    CATHETERIZATION OF BOTH LEFT AND RIGHT HEART N/A 2018    Procedure: CATHETERIZATION, HEART, BOTH LEFT AND RIGHT;  Surgeon: Michelle Holman MD;  Location: [facility] CATH LAB;  Service: Cardiology;  Laterality: N/A;    CATHETERIZATION  "OF BOTH LEFT AND RIGHT HEART N/A 11/30/2018    Procedure: CATHETERIZATION, HEART, BOTH LEFT AND RIGHT;  Surgeon: Michelle Holman MD;  Location: [facility] CATH LAB;  Service: Cardiology;  Laterality: N/A;    CORONARY ARTERY BYPASS GRAFT      CORONARY ARTERY BYPASS GRAFT (CABG) N/A 7/20/2022    Procedure: CORONARY ARTERY BYPASS GRAFT (CABG);  Surgeon: Sanju Locke MD;  Location: [facility] OR;  Service: Cardiothoracic;  Laterality: N/A;  2-VESSEL PUMP ASSIST WITH EPI-AORTIC ULTRASOUND    ENDOSCOPIC HARVEST OF VEIN Left 7/20/2022    Procedure: SURGICAL PROCUREMENT, VEIN, ENDOSCOPIC;  Surgeon: Sanju Locke MD;  Location: [facility] OR;  Service: Cardiothoracic;  Laterality: Left;    HYSTERECTOMY  1982    INJECTION OF ANESTHETIC AGENT AROUND MULTIPLE INTERCOSTAL NERVES N/A 7/20/2022    Procedure: BLOCK, NERVE, INTERCOSTAL, 2 OR MORE;  Surgeon: Sanju Locke MD;  Location: [facility] OR;  Service: Cardiothoracic;  Laterality: N/A;  PARASTERNAL NERVE BLOCK    INSERTION OF SHUNT      LEFT HEART CATHETERIZATION Left 12/3/2018    Procedure: CATHETERIZATION, HEART, LEFT;  Surgeon: Michelle Holman MD;  Location: [facility] CATH LAB;  Service: Cardiology;  Laterality: Left;  LAD ATHERECTOMY STENT/ FEMORAL APPROACH    LEFT HEART CATHETERIZATION Left 7/13/2022    Procedure: CATHETERIZATION, HEART, LEFT;  Surgeon: Abhi Sloan MD;  Location: [facility] CATH LAB;  Service: Cardiology;  Laterality: Left;    OOPHORECTOMY      wrist cyst Right 1980s    dorsal wrist cyst       Review of Systems       Objective:   Ht 5' 4" (1.626 m)   Wt 55.6 kg (122 lb 9.2 oz)   LMP  (LMP Unknown)   BMI 21.04 kg/m²     Ankle Brachial Indices (BLAISE)  · Noncompressible vessels BLE with biphasic and monophasic waveforms            Physical Exam   LOWER EXTREMITY PHYSICAL EXAMINATION    Vascular:  The right dorsalis pedis pulse 1/4 and the right posterior tibial pulse 1/4.  The left dorsalis pedis pulse 1/4 and posterior tibial pulse on the left is 1/4.  Capillary " refill is intact.  Pedal hair growth decreased.     Neurological:  Protective sensation is not intact to the right left foot.  Vibratory sensation is diminished.  Proprioception is intact.  Sharp/dull is reduced.    Musculoskeletal: Manual Muscle Testing is 5/5 with dorsiflexion, plantar flexion, abduction, and adduction.   There is normal range of motion in the forefoot, hindfoot, and Ankle joint.     Dermatological:  Skin is thin, shiny, and atrophic.  There is an eschar present to the distal aspect of the right great toe.  Ulceration is appropriately heel without acute signs of infection.      Assessment:     1. Peripheral arterial disease    2. Controlled type 2 diabetes mellitus with diabetic polyneuropathy, with long-term current use of insulin    3. History of foot ulcer          Plan:     Peripheral arterial disease    Controlled type 2 diabetes mellitus with diabetic polyneuropathy, with long-term current use of insulin    History of foot ulcer        Thorough discussion is had with the patient today, concerning the diagnosis, its etiology, and the treatment algorithm at present.    Foot counseling and education is provided at this visit. Patient is advised to wear socks and shoes at all times.  Do not walk barefoot, or with just socks, even when indoors.  Be sure to check and inspect the inside of the shoe before putting them on her feet.  Protect your feet at all times.  Walking shoes and/or athletic shoes are the best types of shoe gear. Do not wear vinyl or plastic type shoe gear, as they do not stretch and/or breathe.  Protect your feet from hot and/or cold. Elevate the extremities when sitting.  Do not wear excessively tight socks and/or shoe gear. Wiggle your toes for a few minutes throughout the day. Move your ankles up and down, in and out, to help blood flow in your lower extremity.       Future Appointments   Date Time Provider Department Center   2/7/2023  2:30 PM Conchita Garcia RD, CDE  [facility] DIBEDU [facility]   2/14/2023  2:20 PM Ciarra Husain MD [facility]    3/3/2023 10:00 AM JADIEL Coello MD [facility] OPHTHAL [facility]   4/4/2023 10:00 AM LABORATORY, [facility]   4/12/2023 11:30 AM Natty Mistry NP [facility] DIABETE [facility]   8/1/2023  8:00 AM LABORATORY, JEAN [facility]VER[facility]   8/8/2023  1:00 PM Michelle Holman MD [facility]

## 2023-02-07 ENCOUNTER — CLINICAL SUPPORT (OUTPATIENT)
Dept: DIABETES | Facility: CLINIC | Age: 78
End: 2023-02-07
Payer: MEDICARE

## 2023-02-07 ENCOUNTER — TELEPHONE (OUTPATIENT)
Dept: PRIMARY CARE CLINIC | Facility: CLINIC | Age: 78
End: 2023-02-07
Payer: MEDICARE

## 2023-02-07 VITALS — WEIGHT: 124.13 LBS | BODY MASS INDEX: 21.3 KG/M2

## 2023-02-07 DIAGNOSIS — Z79.4 TYPE 2 DIABETES MELLITUS WITH STABLE PROLIFERATIVE RETINOPATHY OF BOTH EYES, WITH LONG-TERM CURRENT USE OF INSULIN: Primary | ICD-10-CM

## 2023-02-07 DIAGNOSIS — E11.3553 TYPE 2 DIABETES MELLITUS WITH STABLE PROLIFERATIVE RETINOPATHY OF BOTH EYES, WITH LONG-TERM CURRENT USE OF INSULIN: Primary | ICD-10-CM

## 2023-02-07 DIAGNOSIS — E21.3 HYPERPARATHYROIDISM: Primary | ICD-10-CM

## 2023-02-07 PROCEDURE — G0108 DIAB MANAGE TRN  PER INDIV: HCPCS | Mod: HCNC,S$GLB,, | Performed by: DIETITIAN, REGISTERED

## 2023-02-07 PROCEDURE — 99999 PR PBB SHADOW E&M-EST. PATIENT-LVL III: ICD-10-PCS | Mod: PBBFAC,HCNC,, | Performed by: DIETITIAN, REGISTERED

## 2023-02-07 PROCEDURE — 99999 PR PBB SHADOW E&M-EST. PATIENT-LVL III: CPT | Mod: PBBFAC,HCNC,, | Performed by: DIETITIAN, REGISTERED

## 2023-02-07 PROCEDURE — G0108 PR DIAB MANAGE TRN  PER INDIV: ICD-10-PCS | Mod: HCNC,S$GLB,, | Performed by: DIETITIAN, REGISTERED

## 2023-02-07 RX ORDER — ASPIRIN 81 MG/1
81 TABLET ORAL DAILY
Qty: 90 TABLET | Refills: 1 | Status: SHIPPED | OUTPATIENT
Start: 2023-02-07 | End: 2023-06-26 | Stop reason: SDUPTHER

## 2023-02-07 NOTE — PROGRESS NOTES
Diabetes Care Specialist Progress Note  Author: Conchita Garcia RD, CDE  Date: 2/7/2023    Program Intake  Reason for Diabetes Program Visit:: Post Program Follow-Up  Type of Intervention:: Individual  Education: Self-Management Skill Review  Current diabetes risk level:: low    Lab Results   Component Value Date    HGBA1C 5.3 09/13/2022       Clinical    Problem Review  Active comorbidities affecting diabetes self-care.:  (HTN, HLD, CAD, CHF, Stroke, Macular degeneration left eye, ESRD, Anemia, VitD defn, Neuropathy)    Clinical Assessment  Current Diabetes Treatment:  (Novolog 70/30 -On dialysis days MWF 10 units acd; on non- dialysis days, 20 units acb & 8 units acb)  Have you ever experienced hypoglycemia (low blood sugar)?:  (Pt report occs nocturnal hypoglycemia symptoms that appear related to late Novolog 70/30 insulin dosing; BG not tested & pt treats using juice.)  Are you able to tell when your blood sugar is low?: Yes  What symptoms do you experience?: Sweaty, Shaky, Tiredness  Have you ever been hospitalized because your blood sugar was too low?: no  How do you treat hypoglycemia (low blood sugar)?: 1/2 can soda/fruit juice  Have you ever experienced hyperglycemia (high blood sugar)?: yes  In the last month, how often have you experienced high blood sugar?: more than once a day  Are you able to tell when your blood sugar is high?: Yes  What are your symptoms?: fatigue (polyphagia)  Have you ever been hospitalized because your blood sugar was high?: no    Medication Information  How many days a week do you miss your medications?: Never  Do you sometimes have difficulty refilling your medications?: No  Medication adherence impacting ability to self-manage diabetes?: No    Labs  Do you have regular lab work to monitor your medications?: Yes  Type of Regular Lab Work: A1c  Where do you get your labs drawn?: [facility]  Lab Compliance Barriers: No    Nutritional Status  Diet:  (Pt reports 3meals, 2snacks  daily & increased appetite. Pt appears to limit portions except for cereal.)  Meal Plan 24 Hour Recall - Breakfast: cornflakes 2cups, occs banana, almond milk  Meal Plan 24 Hour Recall - Lunch: beef sandwich on wheat  Meal Plan 24 Hour Recall - Dinner: 3pm gumbo 1c, rice 1/3c, 1corn bread muffin -- 7-8pm beef sandwich on wheat  Meal Plan 24 Hour Recall - Snack: 1am oatmeal pkt  Change in appetite?: Yes (increased)  Recent Changes in Weight: Weight Gain (~11 since 11/22)  Was weight loss intentional or unintentional?: Intentional  Current nutritional status an area of need that is impacting patient's ability to self-manage diabetes?: No    Diabetes Self-Management Skills Assessment    Nutrition/Healthy Eating  Challenges to healthy eating:: snacking between meals and at night (pt reports increased appetite)  Method of carbohydrate measurement::  (visual estimation, cooking spoons to monitor portions)  Patient can identify foods that impact blood sugar.: yes (requesting refresher of carb portion targets)  Patient-identified foods:: starchy vegetables (corn, peas, beans), starches (bread, pasta, rice, cereal), sweets, soda, fruit/fruit juice, milk, yogurt  Nutrition/Healthy Eating Skills Assessment Completed:: Yes  Assessment indicates:: Instruction Needed  Area of need?: Yes    Physical Activity/Exercise  Patient's daily activity level::  (none structured but planning to start health club)  Patient can identify forms of physical activity.: yes  Stated forms of physical activity:: any movement performed by muscles that uses energy  Patient can identify reasons why exercise/physical activity is important in diabetes management.: no  Physical Activity/Exercise Skills Assessment Completed: : Yes  Assessment indicates:: Instruction Needed  Area of need?: Yes    Medications  Patient is able to describe current diabetes management routine.: yes  Diabetes management routine:: diet, insulin  Patient is able to identify current  diabetes medications, dosages, and appropriate timing of medications.: no (pt & dtr needs review of insulin dose timing and how to use pre-filled insulin pen)  Patient understands the purpose of the medications taken for diabetes.: yes  Patient reports problems or concerns with current medication regimen.: yes  Medication regimen problems/concerns:: unsure of doses, lack of training, does not feel like regimen is working  Medication Skills Assessment Completed:: Yes  Assessment indicates:: Knowledge deficit, Instruction Needed  Area of need?: Yes    Home Blood Glucose Monitoring  Patient states that blood sugar is checked at home daily.: yes  Monitoring Method:: home glucometer  When you check what is your typical blood sugar range? : Per recall, ac/pp dinner readings 200s. Meter/records unavail.  Blood glucose logs:: no, encouraged to bring logs to provider visits  Home Blood Glucose Monitoring Skills Assessment Completed: : Yes  Assessment indicates:: Instruction Needed  Area of need?: Yes    Acute Complications  Patient is able to identify types of acute complications: No  Acute Complications Skills Assessment Completed: : Yes  Assessment indicates:: Instruction Needed  Area of need?: Yes    Assessment Summary and Plan  Based on today's diabetes care assessment, the following areas of need were identified:      Social 9/6/2022   Support No   Access to Mass Media/Tech No   Cognitive/Behavioral Health No   Culture/Uatsdin No   Communication No   Health Literacy No        Clinical 2/7/2023   Medication Adherence No   Lab Compliance No   Nutritional Status No        Diabetes Self-Management Skills 2/7/2023   Nutrition/Healthy Eating Yes -see care plan   Physical Activity/Exercise Yes  Discussed importance of daily physical activity with review of benefits, methods, guidelines and precautions.   Medication Yes  Provided review of current diabetes medication action, dosage, frequency, side effects, and storage  guidelines. Discussed guidelines for preventing, detecting and treating hypoglycemia and hyperglycemia. Reviewed the importance of meal and medication timing with diabetes mediations for prevention of acute complications and maximum drug benefit.  Instruction provided on how to use pre-filled insulin pens using clinic demo. Pt and dtr demonstrate/verbalize understanding.    Home Blood Glucose Monitoring Yes  Reviewed benefits, techniques of self-monitoring blood glucose. Discussed appropriate timing and frequency to SMBG, education on parameters on when to notify provider and advised patient to bring logs to all appts with PCP/Endocrinologist/Diabetes Care Specialist.   Pt & dtr will coord w/ provider Harshil on Rx for Freestyle [employer]. Encouraged them to contact clinic for refresher training prn.   Acute Complications Yes  Discussed prevention, identification signs/symptoms and treatment of hyperglycemia and hypoglycemia and when to contact clinic.       Today's interventions were provided through individual discussion, instruction, and written materials were provided.    Patient verbalized understanding of instruction and written materials.  Pt was able to return back demonstration of instructions today. Patient understood key points, needs reinforcement and further instruction.     Diabetes Self-Management Care Plan:  Today's Diabetes Self-Management Care Plan was developed with Avril's input. Avril has agreed to work toward the following goal(s) to improve his/her overall diabetes control.      Care Plan: Diabetes Management   Updates made since 1/8/2023 12:00 AM        Problem: Healthy Eating         Goal: Eat 3 meals, 2 snacks daily - manage carb 30-45grams/meal, 5-15grams/snack. Completed 2/7/2023   Start Date: 9/6/2022   Expected End Date: 9/6/2023   This Visit's Progress: Deferred   Recent Progress: On track   Priority: High   Barriers: Other (comments)   Note:    Encouraged progress of portion control  management, meal spacing and balancing carb/pro intake. Reviewed carb portion targets using food models, food lists; assisted with additional snack ideas. Reviewed renal meal planning tips to manage Phos/K levels. Encouraged pt to fu w/ renal RD for ongoing support.          Follow Up Plan   Follow up as referred.    Today's care plan and follow up schedule was discussed with patient.  Avril verbalized understanding of the care plan, goals, and agrees to follow up plan.        The patient was encouraged to communicate with his/her health care provider/physician and care team regarding his/her condition(s) and treatment.  I provided the patient with my contact information today and encouraged to contact me via phone or [facility]'s Patient Portal as needed.     Length of Visit   Total Time: 60 Minutes

## 2023-02-07 NOTE — PATIENT INSTRUCTIONS
Humulin 70/30   On dialysis days MWF, continue 10 units before dinner  On non- dialysis days, continue 20 units before breakfast and 8 units before dinner

## 2023-02-07 NOTE — LETTER
February 7, 2023      Natty Mistry, JESSENIA  [address]  [place] [address]         Patient: Avril Monzon   MR Number: [id number]   YOB: 1945   Date of Visit: 2/7/2023       Dear Dr. Mistry:    Thank you for referring Avril for diabetes self-management education and support services. She has completed all components of our Diabetes Management Program. Below is a summary of her clinical outcomes and goal progress.    Patient Outcomes:    A1c Status:   Lab Results   Component Value Date    HGBA1C 5.3 09/13/2022    HGBA1C 9.3 (H) 07/12/2022    HGBA1C 7.3 04/26/2021    HGBA1C 9.0 10/26/2020       Care Plan: Diabetes Management   Updates made since 1/8/2023 12:00 AM        Problem: Healthy Eating         Goal: Eat 3 meals, 2 snacks daily - manage carb 30-45grams/meal, 5-15grams/snack. Completed 2/7/2023   Start Date: 9/6/2022   Expected End Date: 9/6/2023   This Visit's Progress: Deferred   Recent Progress: On track   Priority: High   Barriers: Other (comments)   Note:    Encouraged progress of portion control management, meal spacing and balancing carb/pro intake. Reviewed carb portion targets using food models, food lists; assisted with additional snack ideas. Reviewed renal meal planning tips to manage Phos/K levels. Encouraged pt to fu w/ renal RD for ongoing support.          Follow up:   · Avril to attend medical appointments as scheduled  · Avril to update you on her DM education progress as needed      If you have questions, please do not hesitate to call me. I look forward to providing additional education and support as needed.    Sincerely,    Conchita Garcia RD, CDE  Diabetes Care and

## 2023-02-20 ENCOUNTER — OFFICE VISIT (OUTPATIENT)
Dept: PRIMARY CARE CLINIC | Facility: CLINIC | Age: 78
End: 2023-02-20
Payer: MEDICARE

## 2023-02-20 VITALS
BODY MASS INDEX: 21.96 KG/M2 | HEART RATE: 73 BPM | OXYGEN SATURATION: 99 % | WEIGHT: 128.63 LBS | SYSTOLIC BLOOD PRESSURE: 122 MMHG | TEMPERATURE: 99 F | DIASTOLIC BLOOD PRESSURE: 52 MMHG | HEIGHT: 64 IN

## 2023-02-20 DIAGNOSIS — J04.0 LARYNGITIS: ICD-10-CM

## 2023-02-20 DIAGNOSIS — J06.9 VIRAL URI WITH COUGH: ICD-10-CM

## 2023-02-20 DIAGNOSIS — B34.9 VIRAL ILLNESS: Primary | ICD-10-CM

## 2023-02-20 DIAGNOSIS — J34.89 LESION OF NASAL CAVITY: ICD-10-CM

## 2023-02-20 LAB
CTP QC/QA: YES
CTP QC/QA: YES
FLUAV AG NPH QL: NEGATIVE
FLUBV AG NPH QL: NEGATIVE
SARS-COV-2 AG RESP QL IA.RAPID: NEGATIVE

## 2023-02-20 PROCEDURE — 1159F MED LIST DOCD IN RCRD: CPT | Mod: HCNC,CPTII,S$GLB, | Performed by: NURSE PRACTITIONER

## 2023-02-20 PROCEDURE — 1126F PR PAIN SEVERITY QUANTIFIED, NO PAIN PRESENT: ICD-10-PCS | Mod: HCNC,CPTII,S$GLB, | Performed by: NURSE PRACTITIONER

## 2023-02-20 PROCEDURE — 87804 INFLUENZA ASSAY W/OPTIC: CPT | Mod: QW,HCNC,S$GLB, | Performed by: NURSE PRACTITIONER

## 2023-02-20 PROCEDURE — 99214 OFFICE O/P EST MOD 30 MIN: CPT | Mod: HCNC,25,S$GLB, | Performed by: NURSE PRACTITIONER

## 2023-02-20 PROCEDURE — 99999 PR PBB SHADOW E&M-EST. PATIENT-LVL IV: CPT | Mod: PBBFAC,HCNC,, | Performed by: NURSE PRACTITIONER

## 2023-02-20 PROCEDURE — 87804 POCT INFLUENZA A/B: ICD-10-PCS | Mod: QW,HCNC,S$GLB, | Performed by: NURSE PRACTITIONER

## 2023-02-20 PROCEDURE — 99999 PR PBB SHADOW E&M-EST. PATIENT-LVL IV: ICD-10-PCS | Mod: PBBFAC,HCNC,, | Performed by: NURSE PRACTITIONER

## 2023-02-20 PROCEDURE — 87811 SARS-COV-2 COVID19 W/OPTIC: CPT | Mod: QW,HCNC,S$GLB, | Performed by: NURSE PRACTITIONER

## 2023-02-20 PROCEDURE — 1157F ADVNC CARE PLAN IN RCRD: CPT | Mod: HCNC,CPTII,S$GLB, | Performed by: NURSE PRACTITIONER

## 2023-02-20 PROCEDURE — 87811 SARS CORONAVIRUS 2 ANTIGEN POCT, MANUAL READ: ICD-10-PCS | Mod: QW,HCNC,S$GLB, | Performed by: NURSE PRACTITIONER

## 2023-02-20 PROCEDURE — 99214 PR OFFICE/OUTPT VISIT, EST, LEVL IV, 30-39 MIN: ICD-10-PCS | Mod: HCNC,25,S$GLB, | Performed by: NURSE PRACTITIONER

## 2023-02-20 PROCEDURE — 1157F PR ADVANCE CARE PLAN OR EQUIV PRESENT IN MEDICAL RECORD: ICD-10-PCS | Mod: HCNC,CPTII,S$GLB, | Performed by: NURSE PRACTITIONER

## 2023-02-20 PROCEDURE — 3074F SYST BP LT 130 MM HG: CPT | Mod: HCNC,CPTII,S$GLB, | Performed by: NURSE PRACTITIONER

## 2023-02-20 PROCEDURE — 3078F DIAST BP <80 MM HG: CPT | Mod: HCNC,CPTII,S$GLB, | Performed by: NURSE PRACTITIONER

## 2023-02-20 PROCEDURE — 1101F PT FALLS ASSESS-DOCD LE1/YR: CPT | Mod: HCNC,CPTII,S$GLB, | Performed by: NURSE PRACTITIONER

## 2023-02-20 PROCEDURE — 1101F PR PT FALLS ASSESS DOC 0-1 FALLS W/OUT INJ PAST YR: ICD-10-PCS | Mod: HCNC,CPTII,S$GLB, | Performed by: NURSE PRACTITIONER

## 2023-02-20 PROCEDURE — 3074F PR MOST RECENT SYSTOLIC BLOOD PRESSURE < 130 MM HG: ICD-10-PCS | Mod: HCNC,CPTII,S$GLB, | Performed by: NURSE PRACTITIONER

## 2023-02-20 PROCEDURE — 3288F FALL RISK ASSESSMENT DOCD: CPT | Mod: HCNC,CPTII,S$GLB, | Performed by: NURSE PRACTITIONER

## 2023-02-20 PROCEDURE — 3078F PR MOST RECENT DIASTOLIC BLOOD PRESSURE < 80 MM HG: ICD-10-PCS | Mod: HCNC,CPTII,S$GLB, | Performed by: NURSE PRACTITIONER

## 2023-02-20 PROCEDURE — 96372 THER/PROPH/DIAG INJ SC/IM: CPT | Mod: HCNC,S$GLB,, | Performed by: NURSE PRACTITIONER

## 2023-02-20 PROCEDURE — 3288F PR FALLS RISK ASSESSMENT DOCUMENTED: ICD-10-PCS | Mod: HCNC,CPTII,S$GLB, | Performed by: NURSE PRACTITIONER

## 2023-02-20 PROCEDURE — 1159F PR MEDICATION LIST DOCUMENTED IN MEDICAL RECORD: ICD-10-PCS | Mod: HCNC,CPTII,S$GLB, | Performed by: NURSE PRACTITIONER

## 2023-02-20 PROCEDURE — 96372 PR INJECTION,THERAP/PROPH/DIAG2ST, IM OR SUBCUT: ICD-10-PCS | Mod: HCNC,S$GLB,, | Performed by: NURSE PRACTITIONER

## 2023-02-20 PROCEDURE — 1126F AMNT PAIN NOTED NONE PRSNT: CPT | Mod: HCNC,CPTII,S$GLB, | Performed by: NURSE PRACTITIONER

## 2023-02-20 RX ORDER — MUPIROCIN 20 MG/G
OINTMENT TOPICAL 2 TIMES DAILY
Qty: 22 G | Refills: 0 | Status: SHIPPED | OUTPATIENT
Start: 2023-02-20 | End: 2023-08-17

## 2023-02-20 RX ORDER — LORATADINE 10 MG/1
10 TABLET ORAL DAILY
Qty: 30 TABLET | Refills: 3 | Status: ON HOLD | OUTPATIENT
Start: 2023-02-20 | End: 2024-03-16

## 2023-02-20 RX ORDER — TRIAMCINOLONE ACETONIDE 40 MG/ML
40 INJECTION, SUSPENSION INTRA-ARTICULAR; INTRAMUSCULAR
Status: COMPLETED | OUTPATIENT
Start: 2023-02-20 | End: 2023-02-20

## 2023-02-20 RX ADMIN — TRIAMCINOLONE ACETONIDE 40 MG: 40 INJECTION, SUSPENSION INTRA-ARTICULAR; INTRAMUSCULAR at 10:02

## 2023-02-20 NOTE — ASSESSMENT & PLAN NOTE
Rest  Drink plenty of clear fluids  Nasal saline spray to clear nasal drainage and help with nasal congestion  Zyrtec or Claritin to help dry mucus and post nasal drip  Mucinex or Mucinex DM for cough and chest congestion  Tylenol for fever, headache and body aches  Warm salt water gargles for throat comfort  Chloraseptic spray or lozenges for throat comfort  RTC by Friday 2/20/23 if no improvement

## 2023-02-20 NOTE — PATIENT INSTRUCTIONS
Rest  Drink some warm fluids to help loosen mucus  Nasal saline spray to clear nasal drainage and help with nasal congestion (O[place] Mist)  Zyrtec or Claritin to help dry mucus and post nasal drip  Mucinex for cough and chest congestion  Tylenol or Ibuprofen for fever, headache and body aches  Warm salt water gargles for throat comfort  Chloraseptic spray or lozenges for throat comfort  RTC with no improvement or worsening

## 2023-02-20 NOTE — PROGRESS NOTES
Avril Monzon  02/20/2023  [id number]    Rose Germain MD  Patient Care Team:  Rose Germain MD as PCP - General (Internal Medicine)  Conchita Garcia RD, CDE as Dietitian (Diabetes)  Debbie Wagner RD (Inactive) as Dietitian (Nutrition)  Angela Hanson MD as Consulting Physician (Nephrology)  Demetrio Mejia MD as Consulting Physician (Cardiology)  Conchita Garcia RD, CDE as Diabetes Educator (Diabetes)  Alina Bauer DPM as Consulting Physician (Podiatry)  Anette Montanez DPM as Consulting Physician (Podiatry)  JADIEL Coello MD as Consulting Physician (Ophthalmology)      [facility]      Chief Complaint:  Chief Complaint   Patient presents with    Sore Throat     Pt has had congestion and sore throat since 2/17.       History of Present Illness:  Onset of symptoms 2 weeks ago, Amoxil prescription completed and symptoms had mostly resolved  Still with sneezing and burning of nose after completion of ABX. 1/17/23 - 1/24/23    Then 4 days ago, she was exposed to someone at the dialysis clinic with viral symptoms.   C/o Voice hoarseness, mild scratchy throat, mild sinus headache  Nasal congestion, coryza, frequent clearing of throat, PND  Dry cough, chest congestion  Tender lesion to the right nostril of nose  Denies fever, chills, body aches, otalgia, productive cough, wheezing, chest pain with cough and other symptoms.             Review of Systems   Constitutional:  Negative for chills, fever and malaise/fatigue.   HENT:  Positive for congestion and sore throat (scratchy throat). Negative for ear discharge, ear pain and sinus pain.    Eyes: Negative.    Respiratory:  Positive for cough. Negative for sputum production, shortness of breath and wheezing.    Cardiovascular:  Negative for chest pain.   Gastrointestinal:  Negative for abdominal pain, diarrhea, nausea and vomiting.   Genitourinary: Negative.    Musculoskeletal:  Negative for myalgias.   Neurological:  Negative for  headaches.       The following were reviewed: Active problem list, medication list, allergies, family history, social history, and Health Maintenance.     History:  Past Medical History:   Diagnosis Date    Acute hypoxemic respiratory failure 11/26/2018    Anemia     Arthritis     back, hand, knees    Back pain     Cataract     CKD stage G4/A3, GFR 15 - 29 and albumin creatinine ratio >300 mg/g     GFR 40% Jan 2014 and 33% ub 3/2014 ([facility]G)    Coronary artery disease involving native coronary artery of native heart 11/30/2018    Diabetic retinopathy     DM (diabetes mellitus) type II controlled, neurological manifestation     Exudative age-related macular degeneration of left eye with active choroidal neovascularization 2/5/2019    GERD (gastroesophageal reflux disease)     Glaucoma     Heart failure     Hyperlipidemia     Hypertension     Non-ST elevation MI (NSTEMI) 11/28/2018    NSTEMI (non-ST elevated myocardial infarction) 11/27/2018    Peripheral neuropathy     Polyneuropathy     Proteinuria     >6 mo     Seizures     BRG 1/2014 -dick CT NRI showed small vessel    Skin ulcer of toe of right foot with fat layer exposed 10/14/2022    Stroke 2012,2014    Tobacco dependence     Type 2 diabetes with peripheral circulatory disorder, controlled      Past Surgical History:   Procedure Laterality Date    BREAST LUMPECTOMY Left     benign, when patient was 15 years old    BREAST MASS EXCISION      ,benign, age 15.    CATHETERIZATION OF BOTH LEFT AND RIGHT HEART N/A 11/28/2018    Procedure: CATHETERIZATION, HEART, BOTH LEFT AND RIGHT;  Surgeon: Michelle Holman MD;  Location: [facility] CATH LAB;  Service: Cardiology;  Laterality: N/A;    CATHETERIZATION OF BOTH LEFT AND RIGHT HEART N/A 11/30/2018    Procedure: CATHETERIZATION, HEART, BOTH LEFT AND RIGHT;  Surgeon: Michelle Holman MD;  Location: [facility] CATH LAB;  Service: Cardiology;  Laterality: N/A;    CORONARY ARTERY BYPASS GRAFT      CORONARY ARTERY BYPASS GRAFT (CABG) N/A  7/20/2022    Procedure: CORONARY ARTERY BYPASS GRAFT (CABG);  Surgeon: Sanju Locke MD;  Location: [facility] OR;  Service: Cardiothoracic;  Laterality: N/A;  2-VESSEL PUMP ASSIST WITH EPI-AORTIC ULTRASOUND    ENDOSCOPIC HARVEST OF VEIN Left 7/20/2022    Procedure: SURGICAL PROCUREMENT, VEIN, ENDOSCOPIC;  Surgeon: Sanju Locke MD;  Location: [facility] OR;  Service: Cardiothoracic;  Laterality: Left;    HYSTERECTOMY  1982    INJECTION OF ANESTHETIC AGENT AROUND MULTIPLE INTERCOSTAL NERVES N/A 7/20/2022    Procedure: BLOCK, NERVE, INTERCOSTAL, 2 OR MORE;  Surgeon: Sanju Locke MD;  Location: [facility] OR;  Service: Cardiothoracic;  Laterality: N/A;  PARASTERNAL NERVE BLOCK    INSERTION OF SHUNT      LEFT HEART CATHETERIZATION Left 12/3/2018    Procedure: CATHETERIZATION, HEART, LEFT;  Surgeon: Michelle Holman MD;  Location: [facility] CATH LAB;  Service: Cardiology;  Laterality: Left;  LAD ATHERECTOMY STENT/ FEMORAL APPROACH    LEFT HEART CATHETERIZATION Left 7/13/2022    Procedure: CATHETERIZATION, HEART, LEFT;  Surgeon: Abhi Sloan MD;  Location: [facility] CATH LAB;  Service: Cardiology;  Laterality: Left;    OOPHORECTOMY      wrist cyst Right 1980s    dorsal wrist cyst     Family History   Problem Relation Age of Onset    Diabetes Mother     Hyperlipidemia Mother     Hypertension Mother     Heart disease Mother         MI    Muscular dystrophy Son     Cancer Maternal Grandmother         colon     Patient Active Problem List   Diagnosis    Constipation    Hypertension associated with diabetes    ESRD (end stage renal disease) on dialysis    Proteinuria    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, with long-term current use of insulin    Cataracta brunescens of right eye    Bilateral carotid artery disease    Aortic atherosclerosis    Type 2 diabetes mellitus with circulatory disorder, with long-term current use of insulin    History of CVA (cerebrovascular accident)     Hyperparathyroidism    Vitamin D deficiency    DDD (degenerative disc disease), lumbar    Anemia in chronic kidney disease, on chronic dialysis    Iron deficiency anemia due to chronic blood loss    Hyperlipidemia associated with type 2 diabetes mellitus    Pulmonary hypertension    CAD (coronary artery disease)    Exudative age-related macular degeneration of left eye with active choroidal neovascularization    Non-rheumatic mitral regurgitation    Non-rheumatic tricuspid valve insufficiency    Age-related nuclear cataract of left eye    DM type 2 with diabetic peripheral neuropathy    Unspecified inflammatory spondylopathy, lumbar region    Convulsions    Hemiplegia    Other specified complication of vascular prosthetic devices, implants and grafts, initial encounter    Chronic combined systolic and diastolic heart failure    Metabolic acidosis    S/P CABG (coronary artery bypass graft)    COVID    Skin ulcer of toe of right foot with fat layer exposed    Type 2 diabetes mellitus with diabetic peripheral angiopathy without gangrene, with long-term current use of insulin    Allergic rhinitis    Encounter for preventive health examination    Other reduced mobility    Laryngitis    Viral URI with cough     Review of patient's allergies indicates:   Allergen Reactions    Codeine Swelling    Darvon [propoxyphene] Swelling    Atorvastatin      Muscle twitching       Medications:  Current Outpatient Medications on File Prior to Visit   Medication Sig Dispense Refill    aspirin (ECOTRIN) 81 MG EC tablet Take 1 tablet (81 mg total) by mouth once daily. 90 tablet 1    azelastine (ASTELIN) 137 mcg (0.1 %) nasal spray 1 spray (137 mcg total) by Nasal route 2 (two) times daily. 30 mL 0    blood sugar diagnostic (TRUE METRIX GLUCOSE TEST STRIP) Strp 1 strip by Misc.(Non-Drug; Combo Route) route 3 (three) times daily. 100 each 11    blood-glucose meter (TRUE METRIX GLUCOSE METER) Misc Use as instructed 1 each 0    cyanocobalamin  "(VITAMIN B-12) 1000 MCG tablet Take 100 mcg by mouth once daily.      fluocinonide (LIDEX) 0.05 % external solution Apply topically 2 (two) times daily. Use on scalp 60 mL 5    folic acid (FOLVITE) 1 MG tablet Take 1 mg by mouth once daily.      insulin aspart protamine-insulin aspart (NOVOLOG MIX 70-30FLEXPEN U-100) 100 unit/mL (70-30) InPn pen Inject 20 units into skin before breakfast, 8 units before dinner. On dialysis days, give 10 units before dinner. 15 mL 3    lancets (TRUEPLUS LANCETS) 33 gauge Misc 1 lancet by Misc.(Non-Drug; Combo Route) route 3 (three) times daily. 100 each 11    metoprolol tartrate (LOPRESSOR) 50 MG tablet Take 1 tablet (50 mg total) by mouth 2 (two) times daily. 180 tablet 1    pen needle, diabetic 32 gauge x 5/32" Ndle 1 each by Misc.(Non-Drug; Combo Route) route 2 (two) times a day. 100 each 11    rosuvastatin (CRESTOR) 20 MG tablet Take 1 tablet (20 mg total) by mouth once daily. 90 tablet 3    telmisartan (MICARDIS) 20 MG Tab Take 1 tablet (20 mg total) by mouth once daily. 90 tablet 1    sevelamer carbonate (RENVELA) 800 mg Tab Take 2 tablets (1,600 mg total) by mouth 3 (three) times daily with meals. 180 tablet 11    [DISCONTINUED] isosorbide-hydrALAZINE 20-37.5 mg (BIDIL) 20-37.5 mg Tab Take 1 tablet by mouth 3 (three) times daily. 90 tablet 1     No current facility-administered medications on file prior to visit.       Medications have been reviewed and reconciled with patient at visit today.          Exam:  Vitals:    02/20/23 0909   BP: (!) 122/52   Pulse: 73   Temp: 98.6 °F (37 °C)     Weight: 58.3 kg (128 lb 9.6 oz)   Body mass index is 22.07 kg/m².      BP Readings from Last 3 Encounters:   02/20/23 (!) 122/52   01/24/23 (!) 130/52   01/24/23 (!) 130/52     Wt Readings from Last 3 Encounters:   02/20/23 0909 58.3 kg (128 lb 9.6 oz)   02/07/23 1542 56.3 kg (124 lb 1.9 oz)   01/31/23 1127 55.6 kg (122 lb 9.2 oz)            Physical Exam  Vitals reviewed.   Constitutional:  "      Appearance: She is well-developed. She is ill-appearing.      Comments: Pt noted with voice hoarseness and frequent clearing of throat   HENT:      Head: Normocephalic and atraumatic.      Right Ear: Tympanic membrane normal. Tympanic membrane is not erythematous.      Left Ear: Tympanic membrane normal. Tympanic membrane is not erythematous.      Nose: Nose normal. No mucosal edema, congestion or rhinorrhea.      Right Nostril: No occlusion.      Left Nostril: No occlusion.      Right Sinus: No maxillary sinus tenderness or frontal sinus tenderness.      Left Sinus: No maxillary sinus tenderness or frontal sinus tenderness.        Comments: Mild tenderness to distal aspect of nose but unable to visualize a lesion. No skin erythema or signs of pustule     Mouth/Throat:      Mouth: Mucous membranes are moist.      Pharynx: No pharyngeal swelling, oropharyngeal exudate or posterior oropharyngeal erythema.   Eyes:      Conjunctiva/sclera: Conjunctivae normal.   Cardiovascular:      Rate and Rhythm: Normal rate and regular rhythm.      Heart sounds: Murmur heard.   Pulmonary:      Effort: Pulmonary effort is normal.      Breath sounds: Normal breath sounds.   Abdominal:      General: Bowel sounds are normal.      Palpations: Abdomen is soft.      Tenderness: There is no abdominal tenderness.   Musculoskeletal:         General: No tenderness. Normal range of motion.      Cervical back: Normal range of motion and neck supple.   Skin:     General: Skin is warm and dry.   Neurological:      General: No focal deficit present.      Mental Status: She is alert and oriented to person, place, and time.   Psychiatric:         Behavior: Behavior normal.         Thought Content: Thought content normal.       Laboratory Reviewed:   Lab Results   Component Value Date    WBC 9.37 08/26/2022    HGB 7.5 (L) 08/26/2022    HCT 23.8 (L) 08/26/2022     08/26/2022    CHOL 171 07/13/2022    TRIG 46 07/13/2022    HDL 59 07/13/2022     ALT 12 08/26/2022    AST 18 08/26/2022     (L) 08/26/2022    K 4.2 08/26/2022    CL 99 08/26/2022    CREATININE 5.0 (H) 08/26/2022    BUN 36 (H) 08/26/2022    CO2 23 08/26/2022    TSH 1.792 01/26/2021    INR 1.0 08/07/2022    HGBA1C 5.3 09/13/2022           Health Maintenance  Health Maintenance Topics with due status: Not Due       Topic Last Completion Date    TETANUS VACCINE 12/12/2021    Lipid Panel 07/13/2022    Eye Exam 09/01/2022    Hemoglobin A1c 09/13/2022     Health Maintenance Due   Topic Date Due    Shingles Vaccine (1 of 2) Never done    DEXA Scan  Never done    COVID-19 Vaccine (4 - Booster for Moderna series) 02/25/2022    Foot Exam  03/08/2023       Assessment and Plan:  1. Viral illness  -     POCT Influenza A/B Rapid Antigen  -     SARS Coronavirus 2 Antigen, POCT Manual Read  -     mupirocin (BACTROBAN) 2 % ointment; Apply topically 2 (two) times daily.  Dispense: 22 g; Refill: 0  Negative flu and COVID screen    2. Laryngitis  Assessment & Plan:  Acute  Voice Hoarseness  Kenalog injection  Antihistamines    Orders:  -     triamcinolone acetonide injection 40 mg  -     loratadine (CLARITIN) 10 mg tablet; Take 1 tablet (10 mg total) by mouth once daily.  Dispense: 30 tablet; Refill: 3    3. Lesion of nasal cavity    4. Viral URI with cough  Assessment & Plan:  Rest  Drink plenty of clear fluids  Nasal saline spray to clear nasal drainage and help with nasal congestion  Zyrtec or Claritin to help dry mucus and post nasal drip  Mucinex or Mucinex DM for cough and chest congestion  Tylenol for fever, headache and body aches  Warm salt water gargles for throat comfort  Chloraseptic spray or lozenges for throat comfort  RTC by Friday 2/20/23 if no improvement                     -Patient's lab results were reviewed and discussed with patient  -Treatment options and alternatives were discussed with the patient. Patient expressed understanding. Patient was given the opportunity to ask questions  and be an active participant in their medical care. Patient had no further questions or concerns at this time.         Future Appointments   Date Time Provider Department Center   3/3/2023 10:00 AM JADIEL Coello MD [facility] OPHTHAL [facility]   3/7/2023  1:00 PM [facility] BMD1: BONE DENSITY SCAN [facility] DEXABMD [facility]   4/4/2023 10:00 AM LABORATORY, [facility] HGV LAB [facility]   4/12/2023 11:30 AM Natty Mistry NP [facility] DIABETE [facility]   8/1/2023  8:00 AM LABORATORY, [facility] [place] ON LAB [facility]   8/8/2023  1:00 PM Michelle Holman MD ON CARDIO  [facility]          After visit summary printed and given to patient upon discharge.  Patient goals and care plan are included in After visit summary.    Total medical decision making time was 30 min.    The following issues were discussed: The primary encounter diagnosis was Viral illness. Diagnoses of Laryngitis, Lesion of nasal cavity, and Viral URI with cough were also pertinent to this visit.    Health maintenance needs, recent test results and goals of care discussed with pt and questions answered.           ADEBAYO Bagley, NP-C  [id number] [place] [id number]

## 2023-04-04 ENCOUNTER — LAB VISIT (OUTPATIENT)
Dept: LAB | Facility: HOSPITAL | Age: 78
End: 2023-04-04
Attending: NURSE PRACTITIONER
Payer: MEDICARE

## 2023-04-04 DIAGNOSIS — Z79.4 TYPE 2 DIABETES MELLITUS WITH STABLE PROLIFERATIVE RETINOPATHY OF BOTH EYES, WITH LONG-TERM CURRENT USE OF INSULIN: ICD-10-CM

## 2023-04-04 DIAGNOSIS — E11.3553 TYPE 2 DIABETES MELLITUS WITH STABLE PROLIFERATIVE RETINOPATHY OF BOTH EYES, WITH LONG-TERM CURRENT USE OF INSULIN: ICD-10-CM

## 2023-04-04 LAB
ESTIMATED AVG GLUCOSE: 143 MG/DL (ref 68–131)
HBA1C MFR BLD: 6.6 % (ref 4–5.6)

## 2023-04-04 PROCEDURE — 36415 COLL VENOUS BLD VENIPUNCTURE: CPT | Mod: HCNC | Performed by: NURSE PRACTITIONER

## 2023-04-04 PROCEDURE — 83036 HEMOGLOBIN GLYCOSYLATED A1C: CPT | Mod: HCNC | Performed by: NURSE PRACTITIONER

## 2023-04-13 ENCOUNTER — OFFICE VISIT (OUTPATIENT)
Dept: DIABETES | Facility: CLINIC | Age: 78
End: 2023-04-13
Payer: MEDICARE

## 2023-04-13 DIAGNOSIS — E11.59 HYPERTENSION ASSOCIATED WITH DIABETES: ICD-10-CM

## 2023-04-13 DIAGNOSIS — I15.2 HYPERTENSION ASSOCIATED WITH DIABETES: ICD-10-CM

## 2023-04-13 DIAGNOSIS — E11.69 HYPERLIPIDEMIA ASSOCIATED WITH TYPE 2 DIABETES MELLITUS: ICD-10-CM

## 2023-04-13 DIAGNOSIS — N18.6 ESRD (END STAGE RENAL DISEASE): ICD-10-CM

## 2023-04-13 DIAGNOSIS — Z79.4 TYPE 2 DIABETES MELLITUS WITH STABLE PROLIFERATIVE RETINOPATHY OF BOTH EYES, WITH LONG-TERM CURRENT USE OF INSULIN: Primary | ICD-10-CM

## 2023-04-13 DIAGNOSIS — E78.5 HYPERLIPIDEMIA ASSOCIATED WITH TYPE 2 DIABETES MELLITUS: ICD-10-CM

## 2023-04-13 DIAGNOSIS — E11.3553 TYPE 2 DIABETES MELLITUS WITH STABLE PROLIFERATIVE RETINOPATHY OF BOTH EYES, WITH LONG-TERM CURRENT USE OF INSULIN: Primary | ICD-10-CM

## 2023-04-13 PROCEDURE — 1157F PR ADVANCE CARE PLAN OR EQUIV PRESENT IN MEDICAL RECORD: ICD-10-PCS | Mod: HCNC,CPTII,95, | Performed by: NURSE PRACTITIONER

## 2023-04-13 PROCEDURE — 1159F PR MEDICATION LIST DOCUMENTED IN MEDICAL RECORD: ICD-10-PCS | Mod: HCNC,CPTII,95, | Performed by: NURSE PRACTITIONER

## 2023-04-13 PROCEDURE — 99442 PR PHYSICIAN TELEPHONE EVALUATION 11-20 MIN: ICD-10-PCS | Mod: HCNC,95,, | Performed by: NURSE PRACTITIONER

## 2023-04-13 PROCEDURE — 1160F RVW MEDS BY RX/DR IN RCRD: CPT | Mod: HCNC,CPTII,95, | Performed by: NURSE PRACTITIONER

## 2023-04-13 PROCEDURE — 1157F ADVNC CARE PLAN IN RCRD: CPT | Mod: HCNC,CPTII,95, | Performed by: NURSE PRACTITIONER

## 2023-04-13 PROCEDURE — 99442 PR PHYSICIAN TELEPHONE EVALUATION 11-20 MIN: CPT | Mod: HCNC,95,, | Performed by: NURSE PRACTITIONER

## 2023-04-13 PROCEDURE — 1160F PR REVIEW ALL MEDS BY PRESCRIBER/CLIN PHARMACIST DOCUMENTED: ICD-10-PCS | Mod: HCNC,CPTII,95, | Performed by: NURSE PRACTITIONER

## 2023-04-13 PROCEDURE — 1159F MED LIST DOCD IN RCRD: CPT | Mod: HCNC,CPTII,95, | Performed by: NURSE PRACTITIONER

## 2023-04-13 NOTE — PROGRESS NOTES
Established Patient - Audio Only Telehealth Visit     The patient location is: Home ([place])  The chief complaint leading to consultation is: Diabetes Follow-up  Visit type: Virtual visit with audio only (telephone)  Total time spent with patient: 15 minutes    The reason for the audio only service rather than synchronous audio and video virtual visit was related to technical difficulties or patient preference/necessity.     Each patient to whom I provide medical services by telemedicine is:  (1) informed of the relationship between the physician and patient and the respective role of any other health care provider with respect to management of the patient; and (2) notified that they may decline to receive medical services by telemedicine and may withdraw from such care at any time. Patient verbally consented to receive this service via voice-only telephone call.    This service was not originating from a related E/M service provided within the previous 7 days nor will  to an E/M service or procedure within the next 24 hours or my soonest available appointment.  Prevailing standard of care was able to be met in this audio-only visit.     PCP: Rose Parks MD    Subjective:     Chief Complaint: Diabetes follow up.  Last visit (audio) with me: 1/11/23    HPI: 78 y.o.  female for diabetes follow up.   Previously managed by [facility].   Last clinic visit 1/2016.  Type II diabetes since age 44.  Comorbidities: HTN, HLD, CHF, ESRD, CVA, MI, Dialysis Use - Sched MWF 10-3 PM, Legally blind.  s/p CABG 07/2022  ESRD on dialysis x 2 years  Spoke with daughter Elizabeth on the phone as she is the primary caregiver during the visit.    Family History of Type 1/2 DM: mother  Known diabetes complications:  DKA/HHS: no  Retinopathy: yes  Peripheral neuropathy: yes  Nephropathy: yes    Interval history:  At last visit, stopped Lantus and resumed Novolog 70/30 use.  Denies any recent hospital  admission or ER visit.  Denies any hypoglycemia.  Endorses diabetes related symptoms: Tingling in Lower Extremities and Both fingers    Blood glucose monitoring: via fingerstick, inconsistent  Per patient's daughter recall, over the last 4 weeks   Fasting 119 this am, average 120's    No BG log for review.    Activity level: Sedentary  Meal Plannin meals/day; infrequent snacks/day.    Medication compliance all of the time, diet compliance most of the time.   To be enrolled in diabetes education classes.     Previous treatment: Novolin 70/30, OzempicMaxwelltus    Past failed treatment:  none    Current DM Medications:  Diabetes Medications               insulin aspart protamine-insulin aspart (NOVOLOG MIX 70-30FLEXPEN U-100) 100 unit/mL (70-30) InPn pen Inject 20 units into skin before breakfast, 8 units before dinner. On dialysis days, give 10 units before dinner.       Allergies  Review of patient's allergies indicates:   Allergen Reactions    Codeine Swelling    Darvon [propoxyphene] Swelling    Atorvastatin      Muscle twitching       Labs Reviewed:  Her most recent A1C is:  Lab Results   Component Value Date    HGBA1C 6.6 (H) 2023    HGBA1C 5.3 2022    HGBA1C 9.3 (H) 2022       Her most recent BMP is:  Lab Results   Component Value Date     (L) 2022    K 4.2 2022    CL 99 2022    CO2 23 2022    BUN 36 (H) 2022    CREATININE 5.0 (H) 2022    CALCIUM 9.2 2022    ANIONGAP 12 2022    ESTGFRAFRICA 8 (A) 2022    ESTGFRAFRICA 8 (A) 2022    EGFRNONAA 7 (A) 2022    EGFRNONAA 7 (A) 2022       No results found for: CPEPTIDE  No results found for: GLUTAMICACID    There is no height or weight on file to calculate BMI.    Wt Readings from Last 3 Encounters:   23 0909 58.3 kg (128 lb 9.6 oz)   23 1542 56.3 kg (124 lb 1.9 oz)   23 1127 55.6 kg (122 lb 9.2 oz)        Her blood sugar in clinic today is: Not assessed  due to type of visit.    Diabetes Management Status  Statin: Taking  ACE/ARB: Taking    Screening or Prevention Patient's value Goal Complete/Controlled?   HgA1C Testing and Control   Lab Results   Component Value Date    HGBA1C 6.6 (H) 2023      Annually/Less than 8% Yes     Lipid profile : 2022 Annually Yes     LDL control Lab Results   Component Value Date    LDLCALC 102.8 2022    Annually/Less than 100 mg/dl  No     Nephropathy screening Lab Results   Component Value Date    MICALBCREAT 1244.0 (H) 2018     Lab Results   Component Value Date    PROTEINUA 3+ (A) 2022    Annually Yes     Blood pressure BP Readings from Last 1 Encounters:   23 (!) 122/52    Less than 140/90 Yes     Dilated retinal exam : 2022 Annually Yes    Foot exam   : 2022 Annually No       Social History     Socioeconomic History    Marital status:    Tobacco Use    Smoking status: Former     Packs/day: 1.50     Years: 30.00     Pack years: 45.00     Types: Cigarettes     Quit date: 1990     Years since quittin.2    Smokeless tobacco: Former   Substance and Sexual Activity    Alcohol use: No     Alcohol/week: 0.0 standard drinks    Drug use: No    Sexual activity: Not Currently     Social Determinants of Health     Financial Resource Strain: Low Risk     Difficulty of Paying Living Expenses: Not very hard   Food Insecurity: No Food Insecurity    Worried About Running Out of Food in the Last Year: Never true    Ran Out of Food in the Last Year: Never true   Transportation Needs: Unmet Transportation Needs    Lack of Transportation (Medical): Yes    Lack of Transportation (Non-Medical): No   Physical Activity: Inactive    Days of Exercise per Week: 0 days    Minutes of Exercise per Session: 0 min   Stress: Stress Concern Present    Feeling of Stress : To some extent   Social Connections: Moderately Integrated    Frequency of Communication with Friends and Family: More than three  times a week    Frequency of Social Gatherings with Friends and Family: More than three times a week    Attends [employer] Services: More than 4 times per year    Active Member of Clubs or Organizations: No    Attends Club or Organization Meetings: Never    Marital Status:    Housing Stability: Low Risk     Unable to Pay for Housing in the Last Year: No    Number of Places Lived in the Last Year: 1    Unstable Housing in the Last Year: No     Past Medical History:   Diagnosis Date    Acute hypoxemic respiratory failure 11/26/2018    Anemia     Arthritis     back, hand, knees    Back pain     Cataract     CKD stage G4/A3, GFR 15 - 29 and albumin creatinine ratio >300 mg/g     GFR 40% Jan 2014 and 33% ub 3/2014 ([facility])    Coronary artery disease involving native coronary artery of native heart 11/30/2018    Diabetic retinopathy     DM (diabetes mellitus) type II controlled, neurological manifestation     Exudative age-related macular degeneration of left eye with active choroidal neovascularization 2/5/2019    GERD (gastroesophageal reflux disease)     Glaucoma     Heart failure     Hyperlipidemia     Hypertension     Non-ST elevation MI (NSTEMI) 11/28/2018    NSTEMI (non-ST elevated myocardial infarction) 11/27/2018    Peripheral neuropathy     Polyneuropathy     Proteinuria     >6 mo     Seizures     [facility] 1/2014 -dick CT [place] showed small vessel    Skin ulcer of toe of right foot with fat layer exposed 10/14/2022    Stroke 2012,2014    Tobacco dependence     Type 2 diabetes with peripheral circulatory disorder, controlled      Review of Systems   Constitutional: Negative for activity change, appetite change, fatigue and unexpected weight change.   HENT: Negative for dental problem and trouble swallowing.    Eyes: Negative for visual disturbance. Legally blind.  Respiratory: Negative for chest tightness and shortness of breath.    Cardiovascular: Negative for chest pain, palpitations and leg swelling.    Gastrointestinal: Negative for abdominal pain, constipation, diarrhea, nausea and vomiting.   Endocrine: Negative for polydipsia, polyphagia and polyuria.   Genitourinary: Negative for difficulty urinating, dysuria, frequency and urgency.        Negative yeast infection   Musculoskeletal: Negative for gait problem, myalgias and neck pain.   Integumentary:  Negative for rash and wound.   Allergic/Immunologic: Negative.    Neurological: Positive for numbness (intermittent both feet). Negative for dizziness, syncope, weakness and headaches.        Int tingling BLE, hands   Hematological: Negative.    Psychiatric/Behavioral: Negative for confusion and sleep disturbance.   All other systems reviewed and are negative.     Objective:      Physical Exam  Neurological:      Mental Status: Alert and oriented to person, place, and time. Mental status is at baseline.   Psychiatric:         Attention and Perception: Attention normal.         Mood and Affect: Mood normal.         Speech: Speech normal.         Thought Content: Thought content normal.         Cognition and Memory: Cognition normal.         Judgment: Judgment normal.     Assessment / Plan:     Diagnoses and all orders for this visit:    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, with long-term current use of insulin    Hypertension associated with diabetes    Hyperlipidemia associated with type 2 diabetes mellitus    ESRD (end stage renal disease)    Additional Plan Details:  - Condition: controlled, most recent A1c of 6.6% was completed 1 month ago.  - Monitor blood glucose:  2x daily.Goals reviewed. Maintain BG log. Pt would benefit from CGM use. Ordered "[employer]" 2 supplies.   - CGM note: Patient is testing 4 times per day. Patient is injecting meal time insulin 2 times daily. Patient requires CGM for blood sugar monitoring due to frequent insulin dose changes. Patient reports hypoglycemia, < 50, hypoglycemia unawareness, severe  glucose excursions   - Hypoglycemia protocol: Reviewed and risk discussed.  Notify if BG readings below 80. Protocol printout provided.   - Diet: Limit carbohydrate intake 30-45 grams/meal, 3x daily and 15 grams/snack, limit 2/day.   - Activity: Recommend at least 150 minutes of exercise in a week.  - BP and LDL: Recommend lifestyle modifications and follow up with PCP for management.   - Medication Regimen:     Continue Humulin 70/30   On dialysis days MWF, continue 10 units before dinner  On non- dialysis days, continue 20 units before breakfast and 8 units before dinner    - Medication consideration: Continue regimen.  - Lab results: A1C discussed.  - Health Maintenance standards addressed today:  A1c to be scheduled.   - Risks of uncontrolled DM explained. Importance of routine foot and eye exams reviewed. Scheduled as appropriate.  -The patient was explained the above plan and given opportunity to ask questions.  She understands, chooses and consents to this plan and accepts all the risks, which include but are not limited to the risks mentioned above.   - Labs ordered as above.  - CDE follow up: Scheduled in 2 weeks.  - Follow up: 3 months.         Natty Mistry, FNP-C [facility]    A total of 18 minutes was spent in face to face time, of which over 50 % was spent in counseling patient on disease process, complications, treatment, and side effects of medications.

## 2023-04-13 NOTE — PATIENT INSTRUCTIONS
Medication Regimen:   Continue Humulin 70/30   On dialysis days MWF, continue 10 units before dinner  On non- dialysis days, continue 20 units before breakfast and 8 units before dinner    Patient Instructions:  Carbohydrate Count: 30-45 grams/meal and 15 grams/snack with limit of 2 snacks per day.  Exercise: Goal is 150 minutes or more per week.  Bring meter and blood sugar log to each appointment.     Goals for Blood Sugar:  Fastin-130 mg/dl  2 hours after meal:  mg/dl  Before Bed: 100-150 mg/dl  If Blood sugar is below 70 mg/dl, DO NOT take diabetes medication. Eat first and then recheck blood sugar in 20 minutes.  Foods to eat if blood sugar is below 70 mg/dl  1/2 glass of orange juice   1/2 regular cola can   3-4 hard candies   3-4 small glucose tabs.   Foods to eat if blood sugar is below 50 mg/dl  1 glass of orange juice  1 regular cola can  8 hard candies  8 small glucose tabs.   If you have repeated eating/blood sugar recheck process 2 times and blood sugar still below 70 mg/dl, call 911.

## 2023-04-20 ENCOUNTER — OFFICE VISIT (OUTPATIENT)
Dept: PRIMARY CARE CLINIC | Facility: CLINIC | Age: 78
End: 2023-04-20
Payer: MEDICARE

## 2023-04-20 VITALS
WEIGHT: 125.69 LBS | BODY MASS INDEX: 21.46 KG/M2 | TEMPERATURE: 98 F | DIASTOLIC BLOOD PRESSURE: 50 MMHG | HEIGHT: 64 IN | SYSTOLIC BLOOD PRESSURE: 166 MMHG

## 2023-04-20 DIAGNOSIS — N18.6 ESRD (END STAGE RENAL DISEASE): ICD-10-CM

## 2023-04-20 DIAGNOSIS — E78.5 HYPERLIPIDEMIA ASSOCIATED WITH TYPE 2 DIABETES MELLITUS: Primary | ICD-10-CM

## 2023-04-20 DIAGNOSIS — I15.2 HYPERTENSION ASSOCIATED WITH DIABETES: ICD-10-CM

## 2023-04-20 DIAGNOSIS — E11.69 HYPERLIPIDEMIA ASSOCIATED WITH TYPE 2 DIABETES MELLITUS: Primary | ICD-10-CM

## 2023-04-20 DIAGNOSIS — E11.59 HYPERTENSION ASSOCIATED WITH DIABETES: ICD-10-CM

## 2023-04-20 DIAGNOSIS — R11.2 NAUSEA AND VOMITING, UNSPECIFIED VOMITING TYPE: ICD-10-CM

## 2023-04-20 PROBLEM — J04.0 LARYNGITIS: Status: RESOLVED | Noted: 2023-02-20 | Resolved: 2023-04-20

## 2023-04-20 PROBLEM — J06.9 VIRAL URI WITH COUGH: Status: RESOLVED | Noted: 2023-02-20 | Resolved: 2023-04-20

## 2023-04-20 PROCEDURE — 99215 PR OFFICE/OUTPT VISIT, EST, LEVL V, 40-54 MIN: ICD-10-PCS | Mod: HCNC,S$GLB,, | Performed by: NURSE PRACTITIONER

## 2023-04-20 PROCEDURE — 3078F PR MOST RECENT DIASTOLIC BLOOD PRESSURE < 80 MM HG: ICD-10-PCS | Mod: HCNC,CPTII,S$GLB, | Performed by: NURSE PRACTITIONER

## 2023-04-20 PROCEDURE — 99999 PR PBB SHADOW E&M-EST. PATIENT-LVL IV: CPT | Mod: PBBFAC,HCNC,, | Performed by: NURSE PRACTITIONER

## 2023-04-20 PROCEDURE — 3288F FALL RISK ASSESSMENT DOCD: CPT | Mod: HCNC,CPTII,S$GLB, | Performed by: NURSE PRACTITIONER

## 2023-04-20 PROCEDURE — 3078F DIAST BP <80 MM HG: CPT | Mod: HCNC,CPTII,S$GLB, | Performed by: NURSE PRACTITIONER

## 2023-04-20 PROCEDURE — 3077F PR MOST RECENT SYSTOLIC BLOOD PRESSURE >= 140 MM HG: ICD-10-PCS | Mod: HCNC,CPTII,S$GLB, | Performed by: NURSE PRACTITIONER

## 2023-04-20 PROCEDURE — 99215 OFFICE O/P EST HI 40 MIN: CPT | Mod: HCNC,S$GLB,, | Performed by: NURSE PRACTITIONER

## 2023-04-20 PROCEDURE — 3077F SYST BP >= 140 MM HG: CPT | Mod: HCNC,CPTII,S$GLB, | Performed by: NURSE PRACTITIONER

## 2023-04-20 PROCEDURE — 1159F MED LIST DOCD IN RCRD: CPT | Mod: HCNC,CPTII,S$GLB, | Performed by: NURSE PRACTITIONER

## 2023-04-20 PROCEDURE — 1157F PR ADVANCE CARE PLAN OR EQUIV PRESENT IN MEDICAL RECORD: ICD-10-PCS | Mod: HCNC,CPTII,S$GLB, | Performed by: NURSE PRACTITIONER

## 2023-04-20 PROCEDURE — 3288F PR FALLS RISK ASSESSMENT DOCUMENTED: ICD-10-PCS | Mod: HCNC,CPTII,S$GLB, | Performed by: NURSE PRACTITIONER

## 2023-04-20 PROCEDURE — 1101F PR PT FALLS ASSESS DOC 0-1 FALLS W/OUT INJ PAST YR: ICD-10-PCS | Mod: HCNC,CPTII,S$GLB, | Performed by: NURSE PRACTITIONER

## 2023-04-20 PROCEDURE — 99999 PR PBB SHADOW E&M-EST. PATIENT-LVL IV: ICD-10-PCS | Mod: PBBFAC,HCNC,, | Performed by: NURSE PRACTITIONER

## 2023-04-20 PROCEDURE — 1157F ADVNC CARE PLAN IN RCRD: CPT | Mod: HCNC,CPTII,S$GLB, | Performed by: NURSE PRACTITIONER

## 2023-04-20 PROCEDURE — 1159F PR MEDICATION LIST DOCUMENTED IN MEDICAL RECORD: ICD-10-PCS | Mod: HCNC,CPTII,S$GLB, | Performed by: NURSE PRACTITIONER

## 2023-04-20 PROCEDURE — 1101F PT FALLS ASSESS-DOCD LE1/YR: CPT | Mod: HCNC,CPTII,S$GLB, | Performed by: NURSE PRACTITIONER

## 2023-04-20 RX ORDER — ROSUVASTATIN CALCIUM 20 MG/1
20 TABLET, COATED ORAL DAILY
Qty: 90 TABLET | Refills: 3 | Status: ON HOLD | OUTPATIENT
Start: 2023-04-20 | End: 2024-03-16

## 2023-04-20 RX ORDER — ONDANSETRON 4 MG/1
4 TABLET, ORALLY DISINTEGRATING ORAL EVERY 8 HOURS PRN
Qty: 12 TABLET | Refills: 0 | Status: SHIPPED | OUTPATIENT
Start: 2023-04-20 | End: 2023-11-13 | Stop reason: CLARIF

## 2023-04-20 NOTE — PATIENT INSTRUCTIONS
Bring BP machine to next visit    Take Tylenol for headache  Take Zofran as needed for nausea and vomiting    Continue taking your Telmesartan and metoprolol    Sent prescription for Crestor for your pharmacy

## 2023-04-20 NOTE — ASSESSMENT & PLAN NOTE
Systolic BP at home 180s this AM  Repeat manual in clinic 166/50  Continue Telmisartan 20 mg daily  Monitor and record BP at home  Continue metoprolol  F/U 1 week for reeval and BP

## 2023-04-20 NOTE — PROGRESS NOTES
Avril Monzon  04/20/2023  [id number]    Rose Germain MD  Patient Care Team:  Rose Germain MD as PCP - General (Internal Medicine)  Conchita Garcia RD, CDE as Dietitian (Diabetes)  Debbie Wagner RD (Inactive) as Dietitian (Nutrition)  Angela Hanson MD as Consulting Physician (Nephrology)  Demetrio Mejia MD as Consulting Physician (Cardiology)  Conchita Garcia RD, CDE as Diabetes Educator (Diabetes)  Alina Bauer DPM as Consulting Physician (Podiatry)  Anette Montanez DPM as Consulting Physician (Podiatry)  JADIEL Coello MD as Consulting Physician (Ophthalmology)      [facility]      Chief Complaint:  Chief Complaint   Patient presents with    Hypertension     Pt woke up at 3 am with a headache. Pt daughter took her blood pressure at 7:30 am, b/p was in the 180s. Pt has been vomiting as well. Pt is also complaining of lower back pain due to a slip in her bathtub.        History of Present Illness:  Attends dialysis MWF. Reports awakening with headache to top of head then had associated vomiting. Headache severe in nature and now milder   Vomited a few times today. Acid reflux bothering her today  Vomited antihypertensive meds this AM.   Has sinus issues, blowing nose, sneezing, coughing, voice hoarseness, clearing throat often  No fever, chills, other viral symptoms, gross neuro deficits, visual disturbance  No chest pain, no SOB, denies palpitations.   Wed - removed 2.5 kg yesterday during HD.  BP has been dropping during HD and MD changed her dry weight. Cramps during dialysis has resolved.   Pt describes headaches associated with eating sweets.     Had syncope on dialysis machine about 3 weeks ago. They stopped ultrafiltration.     Onset after fall approx 4 years ago. No lower back pain currently, pain noticed with increased sitting and trying to mop and sweep  Denies neck pain.    Denies fever, chills, URI symptoms, chest pain, SOB, palpitations, diarrhea, no gross  neuro deficits, urinary symptoms (makes a small amount of urine) and leg swelling       The following were reviewed: Active problem list, medication list, allergies, family history, social history, and Health Maintenance.     History:  Past Medical History:   Diagnosis Date    Acute hypoxemic respiratory failure 11/26/2018    Anemia     Arthritis     back, hand, knees    Back pain     Cataract     CKD stage G4/A3, GFR 15 - 29 and albumin creatinine ratio >300 mg/g     GFR 40% Jan 2014 and 33% ub 3/2014 ([facility])    Coronary artery disease involving native coronary artery of native heart 11/30/2018    Diabetic retinopathy     DM (diabetes mellitus) type II controlled, neurological manifestation     Exudative age-related macular degeneration of left eye with active choroidal neovascularization 2/5/2019    GERD (gastroesophageal reflux disease)     Glaucoma     Heart failure     Hyperlipidemia     Hypertension     Non-ST elevation MI (NSTEMI) 11/28/2018    NSTEMI (non-ST elevated myocardial infarction) 11/27/2018    Peripheral neuropathy     Polyneuropathy     Proteinuria     >6 mo     Seizures     BRG 1/2014 -dick CT NRI showed small vessel    Skin ulcer of toe of right foot with fat layer exposed 10/14/2022    Stroke 2012,2014    Tobacco dependence     Type 2 diabetes with peripheral circulatory disorder, controlled      Past Surgical History:   Procedure Laterality Date    BREAST LUMPECTOMY Left     benign, when patient was 15 years old    BREAST MASS EXCISION      ,benign, age 15.    CATHETERIZATION OF BOTH LEFT AND RIGHT HEART N/A 11/28/2018    Procedure: CATHETERIZATION, HEART, BOTH LEFT AND RIGHT;  Surgeon: Michelle Holman MD;  Location: [facility] CATH LAB;  Service: Cardiology;  Laterality: N/A;    CATHETERIZATION OF BOTH LEFT AND RIGHT HEART N/A 11/30/2018    Procedure: CATHETERIZATION, HEART, BOTH LEFT AND RIGHT;  Surgeon: Michelle Holman MD;  Location: [facility] CATH LAB;  Service: Cardiology;  Laterality: N/A;    CORONARY  ARTERY BYPASS GRAFT      CORONARY ARTERY BYPASS GRAFT (CABG) N/A 7/20/2022    Procedure: CORONARY ARTERY BYPASS GRAFT (CABG);  Surgeon: Sanju Locke MD;  Location: [facility];  Service: Cardiothoracic;  Laterality: N/A;  2-VESSEL PUMP ASSIST WITH EPI-AORTIC ULTRASOUND    ENDOSCOPIC HARVEST OF VEIN Left 7/20/2022    Procedure: SURGICAL PROCUREMENT, VEIN, ENDOSCOPIC;  Surgeon: Sanju Locke MD;  Location: [facility];  Service: Cardiothoracic;  Laterality: Left;    HYSTERECTOMY  1982    INJECTION OF ANESTHETIC AGENT AROUND MULTIPLE INTERCOSTAL NERVES N/A 7/20/2022    Procedure: BLOCK, NERVE, INTERCOSTAL, 2 OR MORE;  Surgeon: Sanju Locke MD;  Location: [facility];  Service: Cardiothoracic;  Laterality: N/A;  PARASTERNAL NERVE BLOCK    INSERTION OF SHUNT      LEFT HEART CATHETERIZATION Left 12/3/2018    Procedure: CATHETERIZATION, HEART, LEFT;  Surgeon: Michelle Holman MD;  Location: [facility] CATH LAB;  Service: Cardiology;  Laterality: Left;  LAD ATHERECTOMY STENT/ FEMORAL APPROACH    LEFT HEART CATHETERIZATION Left 7/13/2022    Procedure: CATHETERIZATION, HEART, LEFT;  Surgeon: Abhi Sloan MD;  Location: [facility] CATH LAB;  Service: Cardiology;  Laterality: Left;    OOPHORECTOMY      wrist cyst Right 1980s    dorsal wrist cyst     Family History   Problem Relation Age of Onset    Diabetes Mother     Hyperlipidemia Mother     Hypertension Mother     Heart disease Mother         MI    Muscular dystrophy Son     Cancer Maternal Grandmother         colon     Patient Active Problem List   Diagnosis    Constipation    Hypertension associated with diabetes    ESRD (end stage renal disease)    Proteinuria    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, with long-term current use of insulin    Cataracta brunescens of right eye    Bilateral carotid artery disease    Aortic atherosclerosis    Type 2 diabetes mellitus with circulatory disorder, with long-term current use of insulin    History  of CVA (cerebrovascular accident)    Hyperparathyroidism    Vitamin D deficiency    DDD (degenerative disc disease), lumbar    Anemia in chronic kidney disease, on chronic dialysis    Iron deficiency anemia due to chronic blood loss    Hyperlipidemia associated with type 2 diabetes mellitus    Pulmonary hypertension    CAD (coronary artery disease)    Exudative age-related macular degeneration of left eye with active choroidal neovascularization    Non-rheumatic mitral regurgitation    Non-rheumatic tricuspid valve insufficiency    Age-related nuclear cataract of left eye    DM type 2 with diabetic peripheral neuropathy    Unspecified inflammatory spondylopathy, lumbar region    Convulsions    Hemiplegia    Other specified complication of vascular prosthetic devices, implants and grafts, initial encounter    Chronic combined systolic and diastolic heart failure    Metabolic acidosis    S/P CABG (coronary artery bypass graft)    COVID    Skin ulcer of toe of right foot with fat layer exposed    Type 2 diabetes mellitus with diabetic peripheral angiopathy without gangrene, with long-term current use of insulin    Allergic rhinitis    Encounter for preventive health examination    Other reduced mobility    Nausea & vomiting     Review of patient's allergies indicates:   Allergen Reactions    Codeine Swelling    Darvon [propoxyphene] Swelling    Atorvastatin      Muscle twitching       Medications:  Current Outpatient Medications on File Prior to Visit   Medication Sig Dispense Refill    aspirin (ECOTRIN) 81 MG EC tablet Take 1 tablet (81 mg total) by mouth once daily. 90 tablet 1    azelastine (ASTELIN) 137 mcg (0.1 %) nasal spray 1 spray (137 mcg total) by Nasal route 2 (two) times daily. 30 mL 0    blood sugar diagnostic (TRUE METRIX GLUCOSE TEST STRIP) Strp 1 strip by Misc.(Non-Drug; Combo Route) route 3 (three) times daily. 100 each 11    blood-glucose meter (TRUE METRIX GLUCOSE METER) Misc Use as instructed 1 each  "0    cyanocobalamin (VITAMIN B-12) 1000 MCG tablet Take 100 mcg by mouth once daily.      fluocinonide (LIDEX) 0.05 % external solution Apply topically 2 (two) times daily. Use on scalp 60 mL 5    folic acid (FOLVITE) 1 MG tablet Take 1 mg by mouth once daily.      insulin aspart protamine-insulin aspart (NOVOLOG MIX 70-30FLEXPEN U-100) 100 unit/mL (70-30) InPn pen Inject 20 units into skin before breakfast, 8 units before dinner. On dialysis days, give 10 units before dinner. 15 mL 3    lancets (TRUEPLUS LANCETS) 33 gauge Misc 1 lancet by Misc.(Non-Drug; Combo Route) route 3 (three) times daily. 100 each 11    loratadine (CLARITIN) 10 mg tablet Take 1 tablet (10 mg total) by mouth once daily. 30 tablet 3    metoprolol tartrate (LOPRESSOR) 50 MG tablet Take 1 tablet (50 mg total) by mouth 2 (two) times daily. 180 tablet 1    mupirocin (BACTROBAN) 2 % ointment Apply topically 2 (two) times daily. 22 g 0    pen needle, diabetic 32 gauge x 5/32" Ndle 1 each by Misc.(Non-Drug; Combo Route) route 2 (two) times a day. 100 each 11    sevelamer carbonate (RENVELA) 800 mg Tab Take 3 tablets (2,400 mg) by mouth three times a day with meals and 2 tablets (1,600 mg) by mouth with snacks 1170 tablet 3    telmisartan (MICARDIS) 20 MG Tab Take 1 tablet (20 mg total) by mouth once daily. 90 tablet 1    [DISCONTINUED] rosuvastatin (CRESTOR) 20 MG tablet Take 1 tablet (20 mg total) by mouth once daily. 90 tablet 3    [DISCONTINUED] isosorbide-hydrALAZINE 20-37.5 mg (BIDIL) 20-37.5 mg Tab Take 1 tablet by mouth 3 (three) times daily. 90 tablet 1     No current facility-administered medications on file prior to visit.       Medications have been reviewed and reconciled with patient at visit today.          Exam:  Vitals:    04/20/23 1436   BP: (!) 166/50   Temp:      Weight: 57 kg (125 lb 11.2 oz)   Body mass index is 21.58 kg/m².      BP Readings from Last 3 Encounters:   04/20/23 (!) 166/50   02/20/23 (!) 122/52   01/24/23 (!) 130/52 "     Wt Readings from Last 3 Encounters:   04/20/23 1338 57 kg (125 lb 11.2 oz)   02/20/23 0909 58.3 kg (128 lb 9.6 oz)   02/07/23 1542 56.3 kg (124 lb 1.9 oz)            Physical Exam  Vitals reviewed.   Constitutional:       Appearance: Normal appearance. She is well-developed. She is not ill-appearing.      Comments: Pt noted with voice hoarseness and frequent clearing of throat   HENT:      Head: Normocephalic and atraumatic.      Right Ear: Tympanic membrane normal. Tympanic membrane is not erythematous.      Left Ear: Tympanic membrane normal. Tympanic membrane is not erythematous.      Nose: Nose normal. No mucosal edema, congestion or rhinorrhea.      Right Nostril: No occlusion.      Left Nostril: No occlusion.      Right Sinus: No maxillary sinus tenderness or frontal sinus tenderness.      Left Sinus: No maxillary sinus tenderness or frontal sinus tenderness.        Comments: Mild tenderness to distal aspect of nose but unable to visualize a lesion. No skin erythema or signs of pustule     Mouth/Throat:      Mouth: Mucous membranes are moist.      Pharynx: No pharyngeal swelling, oropharyngeal exudate or posterior oropharyngeal erythema.   Eyes:      Conjunctiva/sclera: Conjunctivae normal.   Cardiovascular:      Rate and Rhythm: Normal rate and regular rhythm.      Heart sounds: Murmur heard.   Pulmonary:      Effort: Pulmonary effort is normal.      Breath sounds: Normal breath sounds.   Abdominal:      General: Bowel sounds are normal.      Palpations: Abdomen is soft.      Tenderness: There is no abdominal tenderness.   Musculoskeletal:         General: No tenderness. Normal range of motion.      Cervical back: Normal range of motion and neck supple.      Right lower leg: No edema.      Left lower leg: No edema.   Skin:     General: Skin is warm and dry.   Neurological:      General: No focal deficit present.      Mental Status: She is alert and oriented to person, place, and time.   Psychiatric:          Behavior: Behavior normal.         Thought Content: Thought content normal.       Laboratory Reviewed:   Lab Results   Component Value Date    WBC 9.37 08/26/2022    HGB 7.5 (L) 08/26/2022    HCT 23.8 (L) 08/26/2022     08/26/2022    CHOL 171 07/13/2022    TRIG 46 07/13/2022    HDL 59 07/13/2022    ALT 12 08/26/2022    AST 18 08/26/2022     (L) 08/26/2022    K 4.2 08/26/2022    CL 99 08/26/2022    CREATININE 5.0 (H) 08/26/2022    BUN 36 (H) 08/26/2022    CO2 23 08/26/2022    TSH 1.792 01/26/2021    INR 1.0 08/07/2022    HGBA1C 6.6 (H) 04/04/2023           Health Maintenance  Health Maintenance Topics with due status: Not Due       Topic Last Completion Date    TETANUS VACCINE 12/12/2021    Eye Exam 09/01/2022    Lipid Panel 01/23/2023    Hemoglobin A1c 04/04/2023     Health Maintenance Due   Topic Date Due    Shingles Vaccine (1 of 2) Never done    DEXA Scan  Never done    COVID-19 Vaccine (4 - Booster for Moderna series) 02/25/2022    Foot Exam  03/08/2023       Assessment and Plan:  1. Hyperlipidemia associated with type 2 diabetes mellitus  -     rosuvastatin (CRESTOR) 20 MG tablet; Take 1 tablet (20 mg total) by mouth once daily.  Dispense: 90 tablet; Refill: 3    2. Nausea and vomiting, unspecified vomiting type  Assessment & Plan:  approx 3 times today  Zofran prn  No gross neuro deficits noted, denies viral symptoms    Orders:  -     ondansetron (ZOFRAN-ODT) 4 MG TbDL; Take 1 tablet (4 mg total) by mouth every 8 (eight) hours as needed (nausea/vomiting).  Dispense: 12 tablet; Refill: 0    3. Hypertension associated with diabetes  Assessment & Plan:  Systolic BP at home 180s this AM  Repeat manual in clinic 166/50  Continue Telmisartan 20 mg daily  Monitor and record BP at home  Continue metoprolol  F/U 1 week for reeval and BP      4. ESRD (end stage renal disease)  Overview:  Hx of unctontrolled HTN and DMII : Admission records reviewed from [facility] in Jan 2014 and March 2014 ; Creatinine was 1.4  in Jan 2014 and had proteinuria c/w HTN and DMII ; since then progressive decline in GFR with UTI from E.Coli ; USD in 9/2014 show chronic Medical Disease                    -Patient's lab results were reviewed and discussed with patient  -Treatment options and alternatives were discussed with the patient. Patient expressed understanding. Patient was given the opportunity to ask questions and be an active participant in their medical care. Patient had no further questions or concerns at this time.         Future Appointments   Date Time Provider Department Center   4/27/2023  2:00 PM Nidhi Miller NP [facility]   8/1/2023  8:00 AM LABORATORY, [facility]   8/8/2023  1:00 PM Michelle Holman MD [facility]   8/15/2023 11:30 AM [facility]          After visit summary printed and given to patient upon discharge.  Patient goals and care plan are included in After visit summary.    Total medical decision making time was 30 min.    The following issues were discussed: The primary encounter diagnosis was Hyperlipidemia associated with type 2 diabetes mellitus. Diagnoses of Nausea and vomiting, unspecified vomiting type, Hypertension associated with diabetes, and ESRD (end stage renal disease) were also pertinent to this visit.    Health maintenance needs, recent test results and goals of care discussed with pt and questions answered.           ADEBAYO Bagley, NP-C  [facility] [place] [address]

## 2023-04-27 ENCOUNTER — OFFICE VISIT (OUTPATIENT)
Dept: PRIMARY CARE CLINIC | Facility: CLINIC | Age: 78
End: 2023-04-27
Payer: MEDICARE

## 2023-04-27 VITALS
BODY MASS INDEX: 22.2 KG/M2 | SYSTOLIC BLOOD PRESSURE: 164 MMHG | WEIGHT: 130 LBS | HEIGHT: 64 IN | TEMPERATURE: 98 F | DIASTOLIC BLOOD PRESSURE: 58 MMHG

## 2023-04-27 DIAGNOSIS — E11.59 HYPERTENSION ASSOCIATED WITH DIABETES: Primary | ICD-10-CM

## 2023-04-27 DIAGNOSIS — R11.2 NAUSEA AND VOMITING, UNSPECIFIED VOMITING TYPE: ICD-10-CM

## 2023-04-27 DIAGNOSIS — I15.2 HYPERTENSION ASSOCIATED WITH DIABETES: Primary | ICD-10-CM

## 2023-04-27 PROCEDURE — 3078F PR MOST RECENT DIASTOLIC BLOOD PRESSURE < 80 MM HG: ICD-10-PCS | Mod: HCNC,CPTII,S$GLB, | Performed by: NURSE PRACTITIONER

## 2023-04-27 PROCEDURE — 3078F DIAST BP <80 MM HG: CPT | Mod: HCNC,CPTII,S$GLB, | Performed by: NURSE PRACTITIONER

## 2023-04-27 PROCEDURE — 1101F PT FALLS ASSESS-DOCD LE1/YR: CPT | Mod: HCNC,CPTII,S$GLB, | Performed by: NURSE PRACTITIONER

## 2023-04-27 PROCEDURE — 1157F PR ADVANCE CARE PLAN OR EQUIV PRESENT IN MEDICAL RECORD: ICD-10-PCS | Mod: HCNC,CPTII,S$GLB, | Performed by: NURSE PRACTITIONER

## 2023-04-27 PROCEDURE — 3077F SYST BP >= 140 MM HG: CPT | Mod: HCNC,CPTII,S$GLB, | Performed by: NURSE PRACTITIONER

## 2023-04-27 PROCEDURE — 99214 PR OFFICE/OUTPT VISIT, EST, LEVL IV, 30-39 MIN: ICD-10-PCS | Mod: HCNC,S$GLB,, | Performed by: NURSE PRACTITIONER

## 2023-04-27 PROCEDURE — 99999 PR PBB SHADOW E&M-EST. PATIENT-LVL III: CPT | Mod: PBBFAC,HCNC,, | Performed by: NURSE PRACTITIONER

## 2023-04-27 PROCEDURE — 1126F PR PAIN SEVERITY QUANTIFIED, NO PAIN PRESENT: ICD-10-PCS | Mod: HCNC,CPTII,S$GLB, | Performed by: NURSE PRACTITIONER

## 2023-04-27 PROCEDURE — 3288F FALL RISK ASSESSMENT DOCD: CPT | Mod: HCNC,CPTII,S$GLB, | Performed by: NURSE PRACTITIONER

## 2023-04-27 PROCEDURE — 1157F ADVNC CARE PLAN IN RCRD: CPT | Mod: HCNC,CPTII,S$GLB, | Performed by: NURSE PRACTITIONER

## 2023-04-27 PROCEDURE — 3077F PR MOST RECENT SYSTOLIC BLOOD PRESSURE >= 140 MM HG: ICD-10-PCS | Mod: HCNC,CPTII,S$GLB, | Performed by: NURSE PRACTITIONER

## 2023-04-27 PROCEDURE — 99999 PR PBB SHADOW E&M-EST. PATIENT-LVL III: ICD-10-PCS | Mod: PBBFAC,HCNC,, | Performed by: NURSE PRACTITIONER

## 2023-04-27 PROCEDURE — 1101F PR PT FALLS ASSESS DOC 0-1 FALLS W/OUT INJ PAST YR: ICD-10-PCS | Mod: HCNC,CPTII,S$GLB, | Performed by: NURSE PRACTITIONER

## 2023-04-27 PROCEDURE — 99214 OFFICE O/P EST MOD 30 MIN: CPT | Mod: HCNC,S$GLB,, | Performed by: NURSE PRACTITIONER

## 2023-04-27 PROCEDURE — 3288F PR FALLS RISK ASSESSMENT DOCUMENTED: ICD-10-PCS | Mod: HCNC,CPTII,S$GLB, | Performed by: NURSE PRACTITIONER

## 2023-04-27 PROCEDURE — 1126F AMNT PAIN NOTED NONE PRSNT: CPT | Mod: HCNC,CPTII,S$GLB, | Performed by: NURSE PRACTITIONER

## 2023-04-27 NOTE — PROGRESS NOTES
Avril Monzon  04/27/2023  [id number]    Rose Germain MD  Patient Care Team:  Rose Germain MD as PCP - General (Internal Medicine)  Conchita Garcia RD, CDE as Dietitian (Diabetes)  Debbie Wagner RD (Inactive) as Dietitian (Nutrition)  Angela Hanson MD as Consulting Physician (Nephrology)  Demetrio Mejia MD as Consulting Physician (Cardiology)  Conchita Garcia RD, CDE as Diabetes Educator (Diabetes)  Alina Bauer DPM as Consulting Physician (Podiatry)  Anette Montanez DPM as Consulting Physician (Podiatry)  JADIEL Coello MD as Consulting Physician (Ophthalmology)      [facility] Note      Chief Complaint:  Chief Complaint   Patient presents with    Follow-up     Pt is here for a blood pressure recheck.        History of Present Illness:  Headache and vomiting. Acid reflux resolved from last visit.   Used Zofran x 1 which did help  B/P reported at home: 130s systolic  No headache this AM on awakening  Woke up sweaty this AM - felt hungry no other symptoms - ate tuna fish sandwich - thought her B/P was low  Had some mild swelling to right leg  Did not take B/P meds this AM because she is going to HD  No SOB, no ALVARADO            Denies fever, chills, URI symptoms, chest pain, SOB, palpitations, vomiting, diarrhea, no gross neuro deficits, urinary symptoms and leg swelling       The following were reviewed: Active problem list, medication list, allergies, family history, social history, and Health Maintenance.     History:  Past Medical History:   Diagnosis Date    Acute hypoxemic respiratory failure 11/26/2018    Anemia     Arthritis     back, hand, knees    Back pain     Cataract     CKD stage G4/A3, GFR 15 - 29 and albumin creatinine ratio >300 mg/g     GFR 40% Jan 2014 and 33% ub 3/2014 ([facility])    Coronary artery disease involving native coronary artery of native heart 11/30/2018    Diabetic retinopathy     DM (diabetes mellitus) type II controlled, neurological manifestation      Exudative age-related macular degeneration of left eye with active choroidal neovascularization 2/5/2019    GERD (gastroesophageal reflux disease)     Glaucoma     Heart failure     Hyperlipidemia     Hypertension     Non-ST elevation MI (NSTEMI) 11/28/2018    NSTEMI (non-ST elevated myocardial infarction) 11/27/2018    Peripheral neuropathy     Polyneuropathy     Proteinuria     >6 mo     Seizures     BRG 1/2014 -dick CT NRI showed small vessel    Skin ulcer of toe of right foot with fat layer exposed 10/14/2022    Stroke 2012,2014    Tobacco dependence     Type 2 diabetes with peripheral circulatory disorder, controlled      Past Surgical History:   Procedure Laterality Date    BREAST LUMPECTOMY Left     benign, when patient was 15 years old    BREAST MASS EXCISION      ,benign, age 15.    CATHETERIZATION OF BOTH LEFT AND RIGHT HEART N/A 11/28/2018    Procedure: CATHETERIZATION, HEART, BOTH LEFT AND RIGHT;  Surgeon: Michelle Holman MD;  Location: [facility] CATH LAB;  Service: Cardiology;  Laterality: N/A;    CATHETERIZATION OF BOTH LEFT AND RIGHT HEART N/A 11/30/2018    Procedure: CATHETERIZATION, HEART, BOTH LEFT AND RIGHT;  Surgeon: Michelle Holman MD;  Location: [facility] CATH LAB;  Service: Cardiology;  Laterality: N/A;    CORONARY ARTERY BYPASS GRAFT      CORONARY ARTERY BYPASS GRAFT (CABG) N/A 7/20/2022    Procedure: CORONARY ARTERY BYPASS GRAFT (CABG);  Surgeon: Sanju Locke MD;  Location: [facility] OR;  Service: Cardiothoracic;  Laterality: N/A;  2-VESSEL PUMP ASSIST WITH EPI-AORTIC ULTRASOUND    ENDOSCOPIC HARVEST OF VEIN Left 7/20/2022    Procedure: SURGICAL PROCUREMENT, VEIN, ENDOSCOPIC;  Surgeon: Sanju Locke MD;  Location: [facility] OR;  Service: Cardiothoracic;  Laterality: Left;    HYSTERECTOMY  1982    INJECTION OF ANESTHETIC AGENT AROUND MULTIPLE INTERCOSTAL NERVES N/A 7/20/2022    Procedure: BLOCK, NERVE, INTERCOSTAL, 2 OR MORE;  Surgeon: Sanju Locke MD;  Location: [facility] OR;  Service: Cardiothoracic;   Laterality: N/A;  PARASTERNAL NERVE BLOCK    INSERTION OF SHUNT      LEFT HEART CATHETERIZATION Left 12/3/2018    Procedure: CATHETERIZATION, HEART, LEFT;  Surgeon: Michelle Holman MD;  Location: [facility] CATH LAB;  Service: Cardiology;  Laterality: Left;  LAD ATHERECTOMY STENT/ FEMORAL APPROACH    LEFT HEART CATHETERIZATION Left 7/13/2022    Procedure: CATHETERIZATION, HEART, LEFT;  Surgeon: Abhi Sloan MD;  Location: [facility] CATH LAB;  Service: Cardiology;  Laterality: Left;    OOPHORECTOMY      wrist cyst Right 1980s    dorsal wrist cyst     Family History   Problem Relation Age of Onset    Diabetes Mother     Hyperlipidemia Mother     Hypertension Mother     Heart disease Mother         MI    Muscular dystrophy Son     Cancer Maternal Grandmother         colon     Patient Active Problem List   Diagnosis    Constipation    Hypertension associated with diabetes    ESRD (end stage renal disease)    Proteinuria    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, with long-term current use of insulin    Cataracta brunescens of right eye    Bilateral carotid artery disease    Aortic atherosclerosis    Type 2 diabetes mellitus with circulatory disorder, with long-term current use of insulin    History of CVA (cerebrovascular accident)    Hyperparathyroidism    Vitamin D deficiency    DDD (degenerative disc disease), lumbar    Anemia in chronic kidney disease, on chronic dialysis    Iron deficiency anemia due to chronic blood loss    Hyperlipidemia associated with type 2 diabetes mellitus    Pulmonary hypertension    CAD (coronary artery disease)    Exudative age-related macular degeneration of left eye with active choroidal neovascularization    Non-rheumatic mitral regurgitation    Non-rheumatic tricuspid valve insufficiency    Age-related nuclear cataract of left eye    DM type 2 with diabetic peripheral neuropathy    Unspecified inflammatory spondylopathy, lumbar region    Convulsions     Hemiplegia    Other specified complication of vascular prosthetic devices, implants and grafts, initial encounter    Chronic combined systolic and diastolic heart failure    Metabolic acidosis    S/P CABG (coronary artery bypass graft)    COVID    Skin ulcer of toe of right foot with fat layer exposed    Type 2 diabetes mellitus with diabetic peripheral angiopathy without gangrene, with long-term current use of insulin    Allergic rhinitis    Encounter for preventive health examination    Other reduced mobility    Nausea & vomiting     Review of patient's allergies indicates:   Allergen Reactions    Codeine Swelling    Darvon [propoxyphene] Swelling    Atorvastatin      Muscle twitching       Medications:  Current Outpatient Medications on File Prior to Visit   Medication Sig Dispense Refill    aspirin (ECOTRIN) 81 MG EC tablet Take 1 tablet (81 mg total) by mouth once daily. 90 tablet 1    azelastine (ASTELIN) 137 mcg (0.1 %) nasal spray 1 spray (137 mcg total) by Nasal route 2 (two) times daily. 30 mL 0    blood sugar diagnostic (TRUE METRIX GLUCOSE TEST STRIP) Strp 1 strip by Misc.(Non-Drug; Combo Route) route 3 (three) times daily. 100 each 11    blood-glucose meter (TRUE METRIX GLUCOSE METER) Misc Use as instructed 1 each 0    cyanocobalamin (VITAMIN B-12) 1000 MCG tablet Take 100 mcg by mouth once daily.      fluocinonide (LIDEX) 0.05 % external solution Apply topically 2 (two) times daily. Use on scalp 60 mL 5    folic acid (FOLVITE) 1 MG tablet Take 1 mg by mouth once daily.      insulin aspart protamine-insulin aspart (NOVOLOG MIX 70-30FLEXPEN U-100) 100 unit/mL (70-30) InPn pen Inject 20 units into skin before breakfast, 8 units before dinner. On dialysis days, give 10 units before dinner. 15 mL 3    lancets (TRUEPLUS LANCETS) 33 gauge Misc 1 lancet by Misc.(Non-Drug; Combo Route) route 3 (three) times daily. 100 each 11    loratadine (CLARITIN) 10 mg tablet Take 1 tablet (10 mg total) by mouth once daily.  "30 tablet 3    metoprolol tartrate (LOPRESSOR) 50 MG tablet Take 1 tablet (50 mg total) by mouth 2 (two) times daily. 180 tablet 1    mupirocin (BACTROBAN) 2 % ointment Apply topically 2 (two) times daily. 22 g 0    ondansetron (ZOFRAN-ODT) 4 MG TbDL Take 1 tablet (4 mg total) by mouth every 8 (eight) hours as needed (nausea/vomiting). 12 tablet 0    pen needle, diabetic 32 gauge x 5/32" Ndle 1 each by Misc.(Non-Drug; Combo Route) route 2 (two) times a day. 100 each 11    rosuvastatin (CRESTOR) 20 MG tablet Take 1 tablet (20 mg total) by mouth once daily. 90 tablet 3    sevelamer carbonate (RENVELA) 800 mg Tab Take 3 tablets (2,400 mg) by mouth three times a day with meals and 2 tablets (1,600 mg) by mouth with snacks 1170 tablet 3    telmisartan (MICARDIS) 20 MG Tab Take 1 tablet (20 mg total) by mouth once daily. 90 tablet 1    [DISCONTINUED] isosorbide-hydrALAZINE 20-37.5 mg (BIDIL) 20-37.5 mg Tab Take 1 tablet by mouth 3 (three) times daily. 90 tablet 1     No current facility-administered medications on file prior to visit.       Medications have been reviewed and reconciled with patient at visit today.          Exam:  Vitals:    04/27/23 0922   BP: (!) 164/58   Temp:      Weight: 59 kg (130 lb)   Body mass index is 22.31 kg/m².      BP Readings from Last 3 Encounters:   04/27/23 (!) 164/58   04/20/23 (!) 166/50   02/20/23 (!) 122/52     Wt Readings from Last 3 Encounters:   04/27/23 0900 59 kg (130 lb)   04/20/23 1338 57 kg (125 lb 11.2 oz)   02/20/23 0909 58.3 kg (128 lb 9.6 oz)            Physical Exam  Vitals reviewed.   Constitutional:       Appearance: Normal appearance. She is well-developed. She is not ill-appearing.   HENT:      Head: Normocephalic and atraumatic.      Right Ear: External ear normal. Tympanic membrane is not erythematous.      Left Ear: External ear normal. Tympanic membrane is not erythematous.      Nose: Nose normal. No mucosal edema.      Right Nostril: No occlusion.      Left " Nostril: No occlusion.      Right Sinus: No maxillary sinus tenderness or frontal sinus tenderness.      Left Sinus: No maxillary sinus tenderness or frontal sinus tenderness.      Mouth/Throat:      Mouth: Mucous membranes are moist.      Pharynx: No pharyngeal swelling.   Eyes:      Conjunctiva/sclera: Conjunctivae normal.   Cardiovascular:      Rate and Rhythm: Normal rate and regular rhythm.      Heart sounds: Murmur heard.   Pulmonary:      Effort: Pulmonary effort is normal.      Breath sounds: Normal breath sounds. No rales.   Abdominal:      General: Bowel sounds are normal.      Palpations: Abdomen is soft.      Tenderness: There is no abdominal tenderness.   Musculoskeletal:         General: No tenderness. Normal range of motion.      Cervical back: Normal range of motion and neck supple.      Right lower leg: No edema.      Left lower leg: No edema.   Skin:     General: Skin is warm and dry.   Neurological:      General: No focal deficit present.      Mental Status: She is alert and oriented to person, place, and time.   Psychiatric:         Behavior: Behavior normal.         Thought Content: Thought content normal.       Laboratory Reviewed:   Lab Results   Component Value Date    WBC 9.37 08/26/2022    HGB 7.5 (L) 08/26/2022    HCT 23.8 (L) 08/26/2022     08/26/2022    CHOL 171 07/13/2022    TRIG 46 07/13/2022    HDL 59 07/13/2022    ALT 12 08/26/2022    AST 18 08/26/2022     (L) 08/26/2022    K 4.2 08/26/2022    CL 99 08/26/2022    CREATININE 5.0 (H) 08/26/2022    BUN 36 (H) 08/26/2022    CO2 23 08/26/2022    TSH 1.792 01/26/2021    INR 1.0 08/07/2022    HGBA1C 6.6 (H) 04/04/2023           Health Maintenance  Health Maintenance Topics with due status: Not Due       Topic Last Completion Date    TETANUS VACCINE 12/12/2021    Eye Exam 09/01/2022    Lipid Panel 01/23/2023    Hemoglobin A1c 04/04/2023     Health Maintenance Due   Topic Date Due    Shingles Vaccine (1 of 2) Never done    DEXA  Scan  Never done    COVID-19 Vaccine (4 - Booster for Moderna series) 02/25/2022    Foot Exam  03/08/2023       Assessment and Plan:  1. Hypertension associated with diabetes  Assessment & Plan:  Reports compliance with medication  Elevated today at clinic - going to HD therefore did not take antihypertensives  Please monitor B/P at home      2. Nausea and vomiting, unspecified vomiting type  Assessment & Plan:  Resolved                   -Patient's lab results were reviewed and discussed with patient  -Treatment options and alternatives were discussed with the patient. Patient expressed understanding. Patient was given the opportunity to ask questions and be an active participant in their medical care. Patient had no further questions or concerns at this time.         Future Appointments   Date Time Provider Department Center   8/1/2023  8:00 AM LABORATORY, [facility]   8/4/2023  9:00 AM Rose Germain MD [facility]   8/8/2023  1:00 PM Michelle Holman MD [facility]   8[facility]15/2023 11:30 [facility]          After visit summary printed and given to patient upon discharge.  Patient goals and care plan are included in After visit summary.    Total medical decision making time was 30 min.    The following issues were discussed: The primary encounter diagnosis was Hypertension associated with diabetes. A diagnosis of Nausea and vomiting, unspecified vomiting type was also pertinent to this visit.    Health maintenance needs, recent test results and goals of care discussed with pt and questions answered.           ADEBAYO Bagley, NP-C  [facility] [place] [address] [place] [id number]

## 2023-04-27 NOTE — ASSESSMENT & PLAN NOTE
Reports compliance with medication  Elevated today at clinic - going to HD therefore did not take antihypertensives  Please monitor B/P at home

## 2023-04-27 NOTE — PATIENT INSTRUCTIONS
If you see any elevations in B/P - call the clinic    Monitor B/P at home   Underwent a successful I&D by Ortho on 10/11, however unfortunately no intra-operative culture data.  Empiric Vanc + cefepime started, and ID is following.  Pain was well-controlled following surgery.  His DM meds were largely held prior to surgery but resumed soon thereafter with labile blood glucose; titrating insulin regimen. PT/OT evaluated with recommendations for home health on discharge. Now awaiting culture data for final ID recommendations for abx.

## 2023-05-01 PROBLEM — Z00.00 ENCOUNTER FOR PREVENTIVE HEALTH EXAMINATION: Status: RESOLVED | Noted: 2023-01-25 | Resolved: 2023-05-01

## 2023-05-11 DIAGNOSIS — E11.3553 TYPE 2 DIABETES MELLITUS WITH STABLE PROLIFERATIVE RETINOPATHY OF BOTH EYES, WITH LONG-TERM CURRENT USE OF INSULIN: ICD-10-CM

## 2023-05-11 DIAGNOSIS — Z79.4 TYPE 2 DIABETES MELLITUS WITH STABLE PROLIFERATIVE RETINOPATHY OF BOTH EYES, WITH LONG-TERM CURRENT USE OF INSULIN: ICD-10-CM

## 2023-05-11 NOTE — TELEPHONE ENCOUNTER
Called pt to verify which pharmacy she wanted her diabetic supplies sent to. I got no answer, but left a voicemail.    SJ

## 2023-05-25 DIAGNOSIS — N18.6 ESRD (END STAGE RENAL DISEASE): ICD-10-CM

## 2023-05-25 DIAGNOSIS — E11.59 HYPERTENSION ASSOCIATED WITH DIABETES: Chronic | ICD-10-CM

## 2023-05-25 DIAGNOSIS — I15.2 HYPERTENSION ASSOCIATED WITH DIABETES: Chronic | ICD-10-CM

## 2023-05-25 RX ORDER — TELMISARTAN 20 MG/1
20 TABLET ORAL DAILY
Qty: 90 TABLET | Refills: 1 | Status: SHIPPED | OUTPATIENT
Start: 2023-05-25 | End: 2023-06-26 | Stop reason: SDUPTHER

## 2023-05-30 ENCOUNTER — OFFICE VISIT (OUTPATIENT)
Dept: PODIATRY | Facility: CLINIC | Age: 78
End: 2023-05-30
Payer: MEDICARE

## 2023-05-30 VITALS — BODY MASS INDEX: 22.2 KG/M2 | WEIGHT: 130 LBS | HEIGHT: 64 IN

## 2023-05-30 DIAGNOSIS — I73.9 PERIPHERAL ARTERIAL DISEASE: Primary | ICD-10-CM

## 2023-05-30 DIAGNOSIS — R23.4 ESCHAR OF TOE: ICD-10-CM

## 2023-05-30 PROCEDURE — 1126F AMNT PAIN NOTED NONE PRSNT: CPT | Mod: CPTII,S$GLB,, | Performed by: PODIATRIST

## 2023-05-30 PROCEDURE — 1159F MED LIST DOCD IN RCRD: CPT | Mod: CPTII,S$GLB,, | Performed by: PODIATRIST

## 2023-05-30 PROCEDURE — 1101F PR PT FALLS ASSESS DOC 0-1 FALLS W/OUT INJ PAST YR: ICD-10-PCS | Mod: CPTII,S$GLB,, | Performed by: PODIATRIST

## 2023-05-30 PROCEDURE — 99213 PR OFFICE/OUTPT VISIT, EST, LEVL III, 20-29 MIN: ICD-10-PCS | Mod: S$GLB,,, | Performed by: PODIATRIST

## 2023-05-30 PROCEDURE — 99999 PR PBB SHADOW E&M-EST. PATIENT-LVL III: ICD-10-PCS | Mod: PBBFAC,,, | Performed by: PODIATRIST

## 2023-05-30 PROCEDURE — 99213 OFFICE O/P EST LOW 20 MIN: CPT | Mod: S$GLB,,, | Performed by: PODIATRIST

## 2023-05-30 PROCEDURE — 3288F FALL RISK ASSESSMENT DOCD: CPT | Mod: CPTII,S$GLB,, | Performed by: PODIATRIST

## 2023-05-30 PROCEDURE — 1157F PR ADVANCE CARE PLAN OR EQUIV PRESENT IN MEDICAL RECORD: ICD-10-PCS | Mod: CPTII,S$GLB,, | Performed by: PODIATRIST

## 2023-05-30 PROCEDURE — 1101F PT FALLS ASSESS-DOCD LE1/YR: CPT | Mod: CPTII,S$GLB,, | Performed by: PODIATRIST

## 2023-05-30 PROCEDURE — 1157F ADVNC CARE PLAN IN RCRD: CPT | Mod: CPTII,S$GLB,, | Performed by: PODIATRIST

## 2023-05-30 PROCEDURE — 99999 PR PBB SHADOW E&M-EST. PATIENT-LVL III: CPT | Mod: PBBFAC,,, | Performed by: PODIATRIST

## 2023-05-30 PROCEDURE — 3288F PR FALLS RISK ASSESSMENT DOCUMENTED: ICD-10-PCS | Mod: CPTII,S$GLB,, | Performed by: PODIATRIST

## 2023-05-30 PROCEDURE — 1126F PR PAIN SEVERITY QUANTIFIED, NO PAIN PRESENT: ICD-10-PCS | Mod: CPTII,S$GLB,, | Performed by: PODIATRIST

## 2023-05-30 PROCEDURE — 1159F PR MEDICATION LIST DOCUMENTED IN MEDICAL RECORD: ICD-10-PCS | Mod: CPTII,S$GLB,, | Performed by: PODIATRIST

## 2023-05-30 RX ORDER — AMMONIUM LACTATE 12 G/100G
1 CREAM TOPICAL 2 TIMES DAILY
Qty: 140 G | Refills: 2 | Status: SHIPPED | OUTPATIENT
Start: 2023-05-30 | End: 2023-08-17

## 2023-05-30 NOTE — PROGRESS NOTES
[facility] -   PODIATRIC MEDICINE AND SURGERY  PROGRESS NOTE  5/30/2023    PODIATRY NOTE  PCP: Dr. Rose Parks MD, Last Visit 2/2/21    CHIEF COMPLAINT   Chief Complaint   Patient presents with    Callouses     C/o callus on the right great toe, pt has been seen for problem prev, diabetic, wears sandals, last seen PCP Nidhi Miller on 04/27/23       HPI:    Avril Monzon is a 78 y.o. female who has a past medical history of Acute hypoxemic respiratory failure (11/26/2018), Anemia, Arthritis, Back pain, Cataract, CKD stage G4/A3, GFR 15 - 29 and albumin creatinine ratio >300 mg/g, Coronary artery disease involving native coronary artery of native heart (11/30/2018), Diabetic retinopathy, DM (diabetes mellitus) type II controlled, neurological manifestation, Exudative age-related macular degeneration of left eye with active choroidal neovascularization (2/5/2019), GERD (gastroesophageal reflux disease), Glaucoma, Heart failure, Hyperlipidemia, Hypertension, Non-ST elevation MI (NSTEMI) (11/28/2018), NSTEMI (non-ST elevated myocardial infarction) (11/27/2018), Peripheral neuropathy, Polyneuropathy, Proteinuria, Seizures, Skin ulcer of toe of right foot with fat layer exposed (10/14/2022), Stroke (2012,2014), Tobacco dependence, and Type 2 diabetes with peripheral circulatory disorder, controlled.     Avril presents to clinic today f/u eschar on right great toe. She has no complaints.     Hemoglobin A1C   Date Value Ref Range Status   04/04/2023 6.6 (H) 4.0 - 5.6 % Final     Comment:     ADA Screening Guidelines:  5.7-6.4%  Consistent with prediabetes  >or=6.5%  Consistent with diabetes    High levels of fetal hemoglobin interfere with the HbA1C  assay. Heterozygous hemoglobin variants (HbS, HgC, etc)do  not significantly interfere with this assay.   However, presence of multiple variants may affect accuracy.     09/13/2022 5.3 4.0 - 5.6 % Final     Comment:     ADA Screening  Guidelines:  5.7-6.4%  Consistent with prediabetes  >or=6.5%  Consistent with diabetes    High levels of fetal hemoglobin interfere with the HbA1C  assay. Heterozygous hemoglobin variants (HbS, HgC, etc)do  not significantly interfere with this assay.   However, presence of multiple variants may affect accuracy.     07/12/2022 9.3 (H) 4.0 - 5.6 % Final     Comment:     ADA Screening Guidelines:  5.7-6.4%  Consistent with prediabetes  >or=6.5%  Consistent with diabetes    High levels of fetal hemoglobin interfere with the HbA1C  assay. Heterozygous hemoglobin variants (HbS, HgC, etc)do  not significantly interfere with this assay.   However, presence of multiple variants may affect accuracy.     04/26/2021 7.3 % Final   10/26/2020 9.0 % Final     Comment:     Per Care Everywhere          PM  Past Medical History:   Diagnosis Date    Acute hypoxemic respiratory failure 11/26/2018    Anemia     Arthritis     back, hand, knees    Back pain     Cataract     CKD stage G4/A3, GFR 15 - 29 and albumin creatinine ratio >300 mg/g     GFR 40% Jan 2014 and 33% ub 3/2014 ([facility])    Coronary artery disease involving native coronary artery of native heart 11/30/2018    Diabetic retinopathy     DM (diabetes mellitus) type II controlled, neurological manifestation     Exudative age-related macular degeneration of left eye with active choroidal neovascularization 2/5/2019    GERD (gastroesophageal reflux disease)     Glaucoma     Heart failure     Hyperlipidemia     Hypertension     Non-ST elevation MI (NSTEMI) 11/28/2018    NSTEMI (non-ST elevated myocardial infarction) 11/27/2018    Peripheral neuropathy     Polyneuropathy     Proteinuria     >6 mo     Seizures     [facility]G 1/2014 -dick CT NRI showed small vessel    Skin ulcer of toe of right foot with fat layer exposed 10/14/2022    Stroke 2012,2014    Tobacco dependence     Type 2 diabetes with peripheral circulatory disorder, controlled        PROBLEM LIST  Patient Active Problem List     Diagnosis Date Noted    Nausea & vomiting 04/20/2023    Other reduced mobility 01/25/2023    Allergic rhinitis 01/17/2023    Type 2 diabetes mellitus with diabetic peripheral angiopathy without gangrene, with long-term current use of insulin 10/25/2022    Skin ulcer of toe of right foot with fat layer exposed 10/14/2022    COVID 08/03/2022    S/P CABG (coronary artery bypass graft) 07/20/2022    Metabolic acidosis 07/12/2022    Chronic combined systolic and diastolic heart failure 12/20/2021    Other specified complication of vascular prosthetic devices, implants and grafts, initial encounter 10/19/2021    Unspecified inflammatory spondylopathy, lumbar region 03/11/2021    Convulsions 03/11/2021    Hemiplegia 03/11/2021    DM type 2 with diabetic peripheral neuropathy 06/12/2020    Age-related nuclear cataract of left eye 05/06/2019    Non-rheumatic mitral regurgitation 04/05/2019    Non-rheumatic tricuspid valve insufficiency 04/05/2019    Exudative age-related macular degeneration of left eye with active choroidal neovascularization 02/05/2019    CAD (coronary artery disease) 11/30/2018    Pulmonary hypertension 11/28/2018    Hyperlipidemia associated with type 2 diabetes mellitus 06/08/2018    Iron deficiency anemia due to chronic blood loss 02/22/2018    Anemia in chronic kidney disease, on chronic dialysis 05/18/2017    History of CVA (cerebrovascular accident) 05/16/2017    Hyperparathyroidism 05/16/2017    Vitamin D deficiency 05/16/2017    DDD (degenerative disc disease), lumbar 05/16/2017    Bilateral carotid artery disease 11/11/2016    Aortic atherosclerosis 11/11/2016    Type 2 diabetes mellitus with circulatory disorder, with long-term current use of insulin 11/11/2016    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, with long-term current use of insulin 05/02/2016    Cataracta brunescens of right eye 05/02/2016    ESRD (end stage renal disease)     Proteinuria      "Constipation 02/18/2014    Hypertension associated with diabetes        MEDS  Current Outpatient Medications on File Prior to Visit   Medication Sig Dispense Refill    aspirin (ECOTRIN) 81 MG EC tablet Take 1 tablet (81 mg total) by mouth once daily. 90 tablet 1    azelastine (ASTELIN) 137 mcg (0.1 %) nasal spray 1 spray (137 mcg total) by Nasal route 2 (two) times daily. 30 mL 0    blood sugar diagnostic (TRUE METRIX GLUCOSE TEST STRIP) Strp 1 strip by Misc.(Non-Drug; Combo Route) route 3 (three) times daily. 100 each 11    blood-glucose meter (TRUE METRIX GLUCOSE METER) Misc Use as instructed 1 each 0    cyanocobalamin (VITAMIN B-12) 1000 MCG tablet Take 100 mcg by mouth once daily.      fluocinonide (LIDEX) 0.05 % external solution Apply topically 2 (two) times daily. Use on scalp 60 mL 5    folic acid (FOLVITE) 1 MG tablet Take 1 mg by mouth once daily.      insulin aspart protamine-insulin aspart (NOVOLOG MIX 70-30FLEXPEN U-100) 100 unit/mL (70-30) InPn pen Inject 20 units into skin before breakfast, 8 units before dinner. On dialysis days, give 10 units before dinner. 15 mL 3    lancets (TRUEPLUS LANCETS) 33 gauge Misc 1 lancet by Misc.(Non-Drug; Combo Route) route 3 (three) times daily. 100 each 11    loratadine (CLARITIN) 10 mg tablet Take 1 tablet (10 mg total) by mouth once daily. 30 tablet 3    metoprolol tartrate (LOPRESSOR) 50 MG tablet Take 1 tablet (50 mg total) by mouth 2 (two) times daily. 180 tablet 1    mupirocin (BACTROBAN) 2 % ointment Apply topically 2 (two) times daily. 22 g 0    ondansetron (ZOFRAN-ODT) 4 MG TbDL Take 1 tablet (4 mg total) by mouth every 8 (eight) hours as needed (nausea/vomiting). 12 tablet 0    pen needle, diabetic 32 gauge x 5/32" Ndle 1 each by Misc.(Non-Drug; Combo Route) route 2 (two) times a day. 100 each 11    rosuvastatin (CRESTOR) 20 MG tablet Take 1 tablet (20 mg total) by mouth once daily. 90 tablet 3    sevelamer carbonate (RENVELA) 800 mg Tab Take 3 tablets " (2,400 mg) by mouth three times a day with meals and 2 tablets (1,600 mg) by mouth with snacks 1170 tablet 3    telmisartan (MICARDIS) 20 MG Tab Take 1 tablet (20 mg total) by mouth once daily. 90 tablet 1    [DISCONTINUED] isosorbide-hydrALAZINE 20-37.5 mg (BIDIL) 20-37.5 mg Tab Take 1 tablet by mouth 3 (three) times daily. 90 tablet 1     No current facility-administered medications on file prior to visit.       Medication List with Changes/Refills   New Medications    AMMONIUM LACTATE 12 % CREA    Apply 1 application topically 2 (two) times daily. For calluses   Current Medications    ASPIRIN (ECOTRIN) 81 MG EC TABLET    Take 1 tablet (81 mg total) by mouth once daily.    AZELASTINE (ASTELIN) 137 MCG (0.1 %) NASAL SPRAY    1 spray (137 mcg total) by Nasal route 2 (two) times daily.    BLOOD SUGAR DIAGNOSTIC (TRUE METRIX GLUCOSE TEST STRIP) STRP    1 strip by Misc.(Non-Drug; Combo Route) route 3 (three) times daily.    BLOOD-GLUCOSE METER (TRUE METRIX GLUCOSE METER) MISC    Use as instructed    CYANOCOBALAMIN (VITAMIN B-12) 1000 MCG TABLET    Take 100 mcg by mouth once daily.    FLUOCINONIDE (LIDEX) 0.05 % EXTERNAL SOLUTION    Apply topically 2 (two) times daily. Use on scalp    FOLIC ACID (FOLVITE) 1 MG TABLET    Take 1 mg by mouth once daily.    INSULIN ASPART PROTAMINE-INSULIN ASPART (NOVOLOG MIX 70-30FLEXPEN U-100) 100 UNIT/ML (70-30) INPN PEN    Inject 20 units into skin before breakfast, 8 units before dinner. On dialysis days, give 10 units before dinner.    LANCETS (TRUEPLUS LANCETS) 33 GAUGE MISC    1 lancet by Misc.(Non-Drug; Combo Route) route 3 (three) times daily.    LORATADINE (CLARITIN) 10 MG TABLET    Take 1 tablet (10 mg total) by mouth once daily.    METOPROLOL TARTRATE (LOPRESSOR) 50 MG TABLET    Take 1 tablet (50 mg total) by mouth 2 (two) times daily.    MUPIROCIN (BACTROBAN) 2 % OINTMENT    Apply topically 2 (two) times daily.    ONDANSETRON (ZOFRAN-ODT) 4 MG TBDL    Take 1 tablet (4 mg  "total) by mouth every 8 (eight) hours as needed (nausea/vomiting).    PEN NEEDLE, DIABETIC 32 GAUGE X 5/32" NDLE    1 each by Misc.(Non-Drug; Combo Route) route 2 (two) times a day.    ROSUVASTATIN (CRESTOR) 20 MG TABLET    Take 1 tablet (20 mg total) by mouth once daily.    SEVELAMER CARBONATE (RENVELA) 800 MG TAB    Take 3 tablets (2,400 mg) by mouth three times a day with meals and 2 tablets (1,600 mg) by mouth with snacks    TELMISARTAN (MICARDIS) 20 MG TAB    Take 1 tablet (20 mg total) by mouth once daily.       PSH     Past Surgical History:   Procedure Laterality Date    BREAST LUMPECTOMY Left     benign, when patient was 15 years old    BREAST MASS EXCISION      ,benign, age 15.    CATHETERIZATION OF BOTH LEFT AND RIGHT HEART N/A 11/28/2018    Procedure: CATHETERIZATION, HEART, BOTH LEFT AND RIGHT;  Surgeon: Michelle Holman MD;  Location: [facility] CATH LAB;  Service: Cardiology;  Laterality: N/A;    CATHETERIZATION OF BOTH LEFT AND RIGHT HEART N/A 11/30/2018    Procedure: CATHETERIZATION, HEART, BOTH LEFT AND RIGHT;  Surgeon: Michelle Holman MD;  Location: [facility] CATH LAB;  Service: Cardiology;  Laterality: N/A;    CORONARY ARTERY BYPASS GRAFT      CORONARY ARTERY BYPASS GRAFT (CABG) N/A 7/20/2022    Procedure: CORONARY ARTERY BYPASS GRAFT (CABG);  Surgeon: Sanju Locke MD;  Location: [facility];  Service: Cardiothoracic;  Laterality: N/A;  2-VESSEL PUMP ASSIST WITH EPI-AORTIC ULTRASOUND    ENDOSCOPIC HARVEST OF VEIN Left 7/20/2022    Procedure: SURGICAL PROCUREMENT, VEIN, ENDOSCOPIC;  Surgeon: Sanju Locke MD;  Location: [facility];  Service: Cardiothoracic;  Laterality: Left;    HYSTERECTOMY  1982    INJECTION OF ANESTHETIC AGENT AROUND MULTIPLE INTERCOSTAL NERVES N/A 7/20/2022    Procedure: BLOCK, NERVE, INTERCOSTAL, 2 OR MORE;  Surgeon: Sanju Locke MD;  Location: [facility];  Service: Cardiothoracic;  Laterality: N/A;  PARASTERNAL NERVE BLOCK    INSERTION OF SHUNT      LEFT HEART CATHETERIZATION Left " "12/3/2018    Procedure: CATHETERIZATION, HEART, LEFT;  Surgeon: Michelle Holman MD;  Location: [facility] CATH LAB;  Service: Cardiology;  Laterality: Left;  LAD ATHERECTOMY STENT/ FEMORAL APPROACH    LEFT HEART CATHETERIZATION Left 2022    Procedure: CATHETERIZATION, HEART, LEFT;  Surgeon: Abhi Sloan MD;  Location: [facility] CATH LAB;  Service: Cardiology;  Laterality: Left;    OOPHORECTOMY      wrist cyst Right 1980s    dorsal wrist cyst        ALL  Review of patient's allergies indicates:   Allergen Reactions    Codeine Swelling    Darvon [propoxyphene] Swelling       SOC     Social History     Tobacco Use    Smoking status: Former     Packs/day: 1.50     Years: 30.00     Pack years: 45.00     Types: Cigarettes     Quit date: 1990     Years since quittin.4    Smokeless tobacco: Former   Substance Use Topics    Alcohol use: No     Alcohol/week: 0.0 standard drinks    Drug use: No         FAMILY HX    Family History   Problem Relation Age of Onset    Diabetes Mother     Hyperlipidemia Mother     Hypertension Mother     Heart disease Mother         MI    Muscular dystrophy Son     Cancer Maternal Grandmother         colon            REVIEW OF SYSTEMS   General: This patient is well-developed, well-nourished and appears stated age, well-oriented to person, place and time, and cooperative and pleasant on today's visit  Constitutional: Negative for chills and fever.   Respiratory: Negative for shortness of breath.    Cardiovascular: Negative for chest pain, palpitations, orthopnea  Gastrointestinal: Negative for diarrhea, nausea and vomiting.   Musculoskeletal: Positive for above noted in HPI  Skin: positive for skin and nail changes  Neurological: positive for tingling and sensory changes  Peripheral Vascular: no claudication or cyanosis  Psychiatric/Behavioral: Negative for altered mental status     PHYSICAL EXAM:      Vitals:    23 1323   Weight: 59 kg (130 lb)   Height: 5' 4" (1.626 m)   PainSc: " 0-No pain       General: This patient is well-developed, well-nourished and appears stated age, well-oriented to person, place and time, and cooperative and pleasant on today's visit    LOWER EXTREMITY  VASCULAR  Diminished pedal pulses b/l  Capillary refill time immediate to the toes.   Feet are warm to the touch. Skin temperature warm to warm from proximally to distally   There are varicosities, telangiectasias noted to bilateral foot and ankle regions.   There are no ecchymoses noted to bilateral foot and ankle regions.   There is gross lower extremity edema.    DERMATOLOGIC  Skin moist with healthy texture and turgor.  There are no open ulcerations, lacerations, or fissures to bilateral foot and ankle regions. There are no signs of infection as there is no erythema, no proximal-extending lymphangiitis, no fluctuance, or crepitus noted on palpation to bilateral foot and ankle regions.   There is no interdigital maceration.   There is hyperkeratotic lesions noted  distal aspect of right hallux. No eschar noted.    NEUROLOGIC  Epicritic sensation is diminished.   Achilles and patellar deep tendon reflexes intact  Babinski reflex absent    ORTHOPEDIC/BIOMECHANICAL  Muscle strength AT/EHL/EDL/PT: 5/5; Achilles/Gastroc/Soleus: 5/5; PB/PL: 5/5 Muscle tone is normal.  Ankle joint ROM INTACT DF/PF, non-crepitus  STJ ROM  inv/ev, non crepitus         ASSESSMENT     Encounter Diagnoses   Name Primary?    Peripheral arterial disease Yes    Eschar of toe            PLAN  Patient was educated about clinical and imaging findings, and verbalizes understanding of above.     -With patient's permission via verbal consent, the involved area was cleansed with an alcohol swab. Trimming of hyperkeratotic lesions deep to epidermal layer x 1 was performed with a #15 blade without incident. Patient relates relief following the procedure. Patient will continue to monitor the areas daily, inspect feet, wear protective shoe gear when  ambulatory, moisturizer to maintain skin integrity.    -Recommendations on purchase of Urea 40 and/or Amlactin was provided for calluses. Metatarsal pad dispensed and patient was instructed on how to apply for offloading callus. Shoe recommendations provided for accomodative foot wear.    - no further eschar- area is healed. RTC PRN         Future Appointments   Date Time Provider Department Center   8/3/2023  3:00 PM Rose Germain MD [facility]   8/8/2023  1:00 PM Michelle Holman MD [facility]   8/15/2023 11:30 AM Natty Mistry NP [facility] [place]       Report Electronically Signed By:    Anette Montanez DPM   Podiatric Medicine & Surgery  [facility]  5/30/2023     Disclaimer:  This note may have been prepared using voice recognition software, it may have not been extensively proofed, as such there could be errors within the text such as sound alike errors.

## 2023-06-20 DIAGNOSIS — E78.5 HYPERLIPIDEMIA ASSOCIATED WITH TYPE 2 DIABETES MELLITUS: ICD-10-CM

## 2023-06-20 DIAGNOSIS — I25.118 CORONARY ARTERY DISEASE OF NATIVE ARTERY OF NATIVE HEART WITH STABLE ANGINA PECTORIS: ICD-10-CM

## 2023-06-20 DIAGNOSIS — I70.0 AORTIC ATHEROSCLEROSIS: ICD-10-CM

## 2023-06-20 DIAGNOSIS — I15.2 HYPERTENSION ASSOCIATED WITH DIABETES: Primary | ICD-10-CM

## 2023-06-20 DIAGNOSIS — E11.59 HYPERTENSION ASSOCIATED WITH DIABETES: Primary | ICD-10-CM

## 2023-06-20 DIAGNOSIS — I65.23 BILATERAL CAROTID ARTERY STENOSIS: ICD-10-CM

## 2023-06-20 DIAGNOSIS — E11.69 HYPERLIPIDEMIA ASSOCIATED WITH TYPE 2 DIABETES MELLITUS: ICD-10-CM

## 2023-06-20 DIAGNOSIS — I34.0 NON-RHEUMATIC MITRAL REGURGITATION: ICD-10-CM

## 2023-06-23 ENCOUNTER — DOCUMENTATION ONLY (OUTPATIENT)
Dept: NEPHROLOGY | Facility: CLINIC | Age: 78
End: 2023-06-23

## 2023-06-23 ENCOUNTER — DOCUMENTATION ONLY (OUTPATIENT)
Dept: NEPHROLOGY | Facility: CLINIC | Age: 78
End: 2023-06-23
Payer: MEDICARE

## 2023-06-23 NOTE — H&P
"                   History & Physical      Chief Complaint:  H&P    HPI:      78 y.o AAF with ESRD on HD MWF @ [facility].   The patient's first date   of hemodialysis was 10/26/2020 . Her cause of end-stage renal disease is T2DM.  Her current dialysis   prescription is as follows:  ; Facility: [facility]; Tx Type: In-Center Hemodialysis Treatment;  Prescribed Tx Duration: 225 min; Frequency: Three times a week; Dialyzer: Gambro Revaclear [id number]; Concurrent  Access: No; Access (Venous): AV Fistula (Upper Arm (Left)); Needle Access (Venous): JMS, SYSLOC MINI, 16G x 1",  SHARP , TWIN; Access Placement Date (Venous): 04/01/2020; Access (Arterial): AV Fistula (Upper Arm (Left)); Needle  Access (Arterial): JMS, SYSLOC MINI, 16G x 1", SHARP , TWIN; Access Placement Date (Arterial): 04/01/2020; Max  UFR (ml/kg/hr): 12.0 mL/kg/hr; Target Wt: 58.5 kg; Blood Flow Rate: 350 mL/min; Dialysate: Custom; Dialysate Flow  Rate: 600 mL/min; Dialysate Concentration Type: CITRAPURE - DRY; K: 2.0 mEq/L; Ca: 2.5 mEq/L; Bicarbonate: 35.0  mEq/L; Auto Flow: No; Auto Flow Rate: 0; Base Sodium: 138.0 mEq/L; Sequential UF: No        ROS:        Constitutional: Negative for fever, chills, weight loss, malaise/fatigue and diaphoresis.   HENT: Negative for hearing loss, ear pain, nosebleeds, congestion, sore throat, neck pain, tinnitus and ear discharge.    Eyes: Negative for blurred vision, double vision, photophobia, pain, discharge and redness.   Respiratory: Negative for cough, hemoptysis, sputum production, shortness of breath, wheezing and stridor.    Cardiovascular: Negative for chest pain, palpitations, orthopnea, claudication, leg swelling and PND.   Gastrointestinal: Negative for heartburn, nausea, vomiting, abdominal pain, diarrhea, constipation, blood in stool and melena.   Genitourinary: Negative for dysuria, urgency, frequency, hematuria and flank pain.   Musculoskeletal: Negative for myalgias, back pain, joint pain " and falls.   Skin: Negative for itching and rash.   Neurological: Negative for dizziness, tingling, tremors, sensory change, speech change, focal weakness, seizures, loss of consciousness, weakness and headaches.   Endo/Heme/Allergies: Negative for environmental allergies and polydipsia. Does not bruise/bleed easily.   Psychiatric/Behavioral: Negative for depression, suicidal ideas, hallucinations, memory loss and substance abuse. The patient is not nervous/anxious and does not have insomnia.    All 14 systems reviewed and negative except as noted above.            Past Medical History:   Diagnosis Date    Acute hypoxemic respiratory failure 11/26/2018    Anemia     Arthritis     back, hand, knees    Back pain     Cataract     CKD stage G4/A3, GFR 15 - 29 and albumin creatinine ratio >300 mg/g     GFR 40% Jan 2014 and 33% ub 3/2014 ([facility])    Coronary artery disease involving native coronary artery of native heart 11/30/2018    Diabetic retinopathy     DM (diabetes mellitus) type II controlled, neurological manifestation     Exudative age-related macular degeneration of left eye with active choroidal neovascularization 2/5/2019    GERD (gastroesophageal reflux disease)     Glaucoma     Heart failure     Hyperlipidemia     Hypertension     Non-ST elevation MI (NSTEMI) 11/28/2018    NSTEMI (non-ST elevated myocardial infarction) 11/27/2018    Peripheral neuropathy     Polyneuropathy     Proteinuria     >6 mo     Seizures     BRG 1/2014 -dick CT NRI showed small vessel    Skin ulcer of toe of right foot with fat layer exposed 10/14/2022    Stroke 2012,2014    Tobacco dependence     Type 2 diabetes with peripheral circulatory disorder, controlled        Past Surgical History:   Procedure Laterality Date    BREAST LUMPECTOMY Left     benign, when patient was 15 years old    BREAST MASS EXCISION      ,benign, age 15.    CATHETERIZATION OF BOTH LEFT AND RIGHT HEART N/A 11/28/2018    Procedure: CATHETERIZATION, HEART, BOTH  LEFT AND RIGHT;  Surgeon: Michelle Holman MD;  Location: [facility] CATH LAB;  Service: Cardiology;  Laterality: N/A;    CATHETERIZATION OF BOTH LEFT AND RIGHT HEART N/A 11/30/2018    Procedure: CATHETERIZATION, HEART, BOTH LEFT AND RIGHT;  Surgeon: Michelle Holman MD;  Location: [facility] CATH LAB;  Service: Cardiology;  Laterality: N/A;    CORONARY ARTERY BYPASS GRAFT      CORONARY ARTERY BYPASS GRAFT (CABG) N/A 7/20/2022    Procedure: CORONARY ARTERY BYPASS GRAFT (CABG);  Surgeon: Sanju Locke MD;  Location: [facility] OR;  Service: Cardiothoracic;  Laterality: N/A;  2-VESSEL PUMP ASSIST WITH EPI-AORTIC ULTRASOUND    ENDOSCOPIC HARVEST OF VEIN Left 7/20/2022    Procedure: SURGICAL PROCUREMENT, VEIN, ENDOSCOPIC;  Surgeon: Sanju Locke MD;  Location: [facility];  Service: Cardiothoracic;  Laterality: Left;    HYSTERECTOMY  1982    INJECTION OF ANESTHETIC AGENT AROUND MULTIPLE INTERCOSTAL NERVES N/A 7/20/2022    Procedure: BLOCK, NERVE, INTERCOSTAL, 2 OR MORE;  Surgeon: Sanju Locke MD;  Location: [facility];  Service: Cardiothoracic;  Laterality: N/A;  PARASTERNAL NERVE BLOCK    INSERTION OF SHUNT      LEFT HEART CATHETERIZATION Left 12/3/2018    Procedure: CATHETERIZATION, HEART, LEFT;  Surgeon: Michelle Holman MD;  Location: [facility] CATH LAB;  Service: Cardiology;  Laterality: Left;  LAD ATHERECTOMY STENT/ FEMORAL APPROACH    LEFT HEART CATHETERIZATION Left 7/13/2022    Procedure: CATHETERIZATION, HEART, LEFT;  Surgeon: Abhi Sloan MD;  Location: [facility] CATH LAB;  Service: Cardiology;  Laterality: Left;    OOPHORECTOMY      wrist cyst Right 1980s    dorsal wrist cyst       Family History   Problem Relation Age of Onset    Diabetes Mother     Hyperlipidemia Mother     Hypertension Mother     Heart disease Mother         MI    Muscular dystrophy Son     Cancer Maternal Grandmother         colon       Social History     Socioeconomic History    Marital status:    Tobacco Use    Smoking status: Former      Packs/day: 1.50     Years: 30.00     Pack years: 45.00     Types: Cigarettes     Quit date: 1990     Years since quittin.4    Smokeless tobacco: Former   Substance and Sexual Activity    Alcohol use: No     Alcohol/week: 0.0 standard drinks    Drug use: No    Sexual activity: Not Currently     Social Determinants of Health     Financial Resource Strain: Low Risk     Difficulty of Paying Living Expenses: Not very hard   Food Insecurity: No Food Insecurity    Worried About Running Out of Food in the Last Year: Never true    Ran Out of Food in the Last Year: Never true   Transportation Needs: Unmet Transportation Needs    Lack of Transportation (Medical): Yes    Lack of Transportation (Non-Medical): No   Physical Activity: Inactive    Days of Exercise per Week: 0 days    Minutes of Exercise per Session: 0 min   Stress: Stress Concern Present    Feeling of Stress : To some extent   Social Connections: Moderately Integrated    Frequency of Communication with Friends and Family: More than three times a week    Frequency of Social Gatherings with Friends and Family: More than three times a week    Attends [employer] Services: More than 4 times per year    Active Member of Clubs or Organizations: No    Attends Club or Organization Meetings: Never    Marital Status:    Housing Stability: Low Risk     Unable to Pay for Housing in the Last Year: No    Number of Places Lived in the Last Year: 1    Unstable Housing in the Last Year: No       Current Outpatient Medications   Medication Sig Dispense Refill    ammonium lactate 12 % Crea Apply 1 application topically 2 (two) times daily. For calluses 140 g 2    aspirin (ECOTRIN) 81 MG EC tablet Take 1 tablet (81 mg total) by mouth once daily. 90 tablet 1    azelastine (ASTELIN) 137 mcg (0.1 %) nasal spray 1 spray (137 mcg total) by Nasal route 2 (two) times daily. 30 mL 0    blood sugar diagnostic (TRUE METRIX GLUCOSE TEST STRIP) Strp 1 strip by Misc.(Non-Drug; Combo  "Route) route 3 (three) times daily. 100 each 11    blood-glucose meter (TRUE METRIX GLUCOSE METER) Misc Use as instructed 1 each 0    cyanocobalamin (VITAMIN B-12) 1000 MCG tablet Take 100 mcg by mouth once daily.      fluocinonide (LIDEX) 0.05 % external solution Apply topically 2 (two) times daily. Use on scalp 60 mL 5    folic acid (FOLVITE) 1 MG tablet Take 1 mg by mouth once daily.      insulin aspart protamine-insulin aspart (NOVOLOG MIX 70-30FLEXPEN U-100) 100 unit/mL (70-30) InPn pen Inject 20 units into skin before breakfast, 8 units before dinner. On dialysis days, give 10 units before dinner. 15 mL 3    lancets (TRUEPLUS LANCETS) 33 gauge Misc 1 lancet by Misc.(Non-Drug; Combo Route) route 3 (three) times daily. 100 each 11    loratadine (CLARITIN) 10 mg tablet Take 1 tablet (10 mg total) by mouth once daily. 30 tablet 3    metoprolol tartrate (LOPRESSOR) 50 MG tablet Take 1 tablet (50 mg total) by mouth 2 (two) times daily. 180 tablet 1    mupirocin (BACTROBAN) 2 % ointment Apply topically 2 (two) times daily. 22 g 0    ondansetron (ZOFRAN-ODT) 4 MG TbDL Take 1 tablet (4 mg total) by mouth every 8 (eight) hours as needed (nausea/vomiting). 12 tablet 0    pen needle, diabetic 32 gauge x 5/32" Ndle 1 each by Misc.(Non-Drug; Combo Route) route 2 (two) times a day. 100 each 11    rosuvastatin (CRESTOR) 20 MG tablet Take 1 tablet (20 mg total) by mouth once daily. 90 tablet 3    sevelamer carbonate (RENVELA) 800 mg Tab Take 3 tablets (2,400 mg) by mouth three times a day with meals and 2 tablets (1,600 mg) by mouth with snacks 1170 tablet 3    telmisartan (MICARDIS) 20 MG Tab Take 1 tablet (20 mg total) by mouth once daily. 90 tablet 1     No current facility-administered medications for this visit.       Review of patient's allergies indicates:   Allergen Reactions    Codeine Swelling    Darvon [propoxyphene] Swelling    Atorvastatin      Muscle twitching         There were no vitals filed for this " visit.          Physical Exam   Nursing Notes and Vital Signs Reviewed.     Constitutional: Well developed, well nourished. AAOx3, NAD, speech/ comprehension clear   Head: Atraumatic. Normocephalic.   Eyes: PERRL. EOMI. Conjunctivae are not pale. No scleral icterus.   ENT: Mucous membranes are dry. No tongue tremors. Throat clear.  Neck: Supple. No JVD or LN or Carotid Bruits noted B.  Cardiovascular: S1S2 RRR, no murmurs, rubs, or gallops. Distal pulses are 2+ and symmetric.   Pulmonary/Chest: No evidence of respiratory distress. Clear to auscultation bilaterally. No wheezing, rales or rhonchi. No chest wall TTP.   Abdominal: Soft and non-distended. There is no tenderness. No rebound, guarding, or rigidity. No organomegaly. No mass or viscera palpable  Musculoskeletal: FROM in all extremities. No deformities, no TTP, no edema. No midline spinal TTP. No step-offs. Pelvis is stable to compression. No cyanosis. Moves all four extremities.   Skin: Skin is warm and dry.   Neurological: No gross neurological deficits, Strength 5/5 B, is equal in the upper and lower extremities bilaterally. No sensory deficits to light touch. No pronator drift.  DTRs are 2+ and equal throughout.   Psychiatric: Good eye contact. Normal Affect.      Laboratory Data:  Reviewed and noted in plan where applicable- Please see chart for full laboratory data.       Lab Results   Component Value Date    WBC 9.37 08/26/2022    HGB 7.5 (L) 08/26/2022    HCT 23.8 (L) 08/26/2022    MCV 98 08/26/2022     08/26/2022     BMP  Lab Results   Component Value Date     (L) 08/26/2022    K 4.2 08/26/2022    CL 99 08/26/2022    CO2 23 08/26/2022    BUN 36 (H) 08/26/2022    CREATININE 5.0 (H) 08/26/2022    CALCIUM 9.2 08/26/2022    ANIONGAP 12 08/26/2022    ESTGFRAFRICA 8 (A) 07/27/2022    ESTGFRAFRICA 8 (A) 07/27/2022    EGFRNONAA 7 (A) 07/27/2022    EGFRNONAA 7 (A) 07/27/2022     CMP  Sodium   Date Value Ref Range Status   08/26/2022 134 (L) 136  - 145 mmol/L Final     Potassium   Date Value Ref Range Status   08/26/2022 4.2 3.5 - 5.1 mmol/L Final     Chloride   Date Value Ref Range Status   08/26/2022 99 95 - 110 mmol/L Final     CO2   Date Value Ref Range Status   08/26/2022 23 23 - 29 mmol/L Final     Glucose   Date Value Ref Range Status   08/26/2022 213 (H) 70 - 110 mg/dL Final     BUN   Date Value Ref Range Status   08/26/2022 36 (H) 8 - 23 mg/dL Final     Creatinine   Date Value Ref Range Status   08/26/2022 5.0 (H) 0.5 - 1.4 mg/dL Final     Calcium   Date Value Ref Range Status   08/26/2022 9.2 8.7 - 10.5 mg/dL Final     Total Protein   Date Value Ref Range Status   08/26/2022 6.7 6.0 - 8.4 g/dL Final     Albumin   Date Value Ref Range Status   08/26/2022 3.0 (L) 3.5 - 5.2 g/dL Final     Total Bilirubin   Date Value Ref Range Status   08/26/2022 0.7 0.1 - 1.0 mg/dL Final     Comment:     For infants and newborns, interpretation of results should be based  on gestational age, weight and in agreement with clinical  observations.    Premature Infant recommended reference ranges:  Up to 24 hours.............<8.0 mg/dL  Up to 48 hours............<12.0 mg/dL  3-5 days..................<15.0 mg/dL  6-29 days.................<15.0 mg/dL       Alkaline Phosphatase   Date Value Ref Range Status   08/26/2022 89 55 - 135 U/L Final     AST   Date Value Ref Range Status   08/26/2022 18 10 - 40 U/L Final     ALT   Date Value Ref Range Status   08/26/2022 12 10 - 44 U/L Final     Anion Gap   Date Value Ref Range Status   08/26/2022 12 8 - 16 mmol/L Final     eGFR if    Date Value Ref Range Status   07/27/2022 8 (A) >60 mL/min/1.73 m^2 Final   07/27/2022 8 (A) >60 mL/min/1.73 m^2 Final     eGFR if non    Date Value Ref Range Status   07/27/2022 7 (A) >60 mL/min/1.73 m^2 Final     Comment:     Calculation used to obtain the estimated glomerular filtration  rate (eGFR) is the CKD-EPI equation.      07/27/2022 7 (A) >60 mL/min/1.73 m^2  Final     Comment:     Calculation used to obtain the estimated glomerular filtration  rate (eGFR) is the CKD-EPI equation.        Lab Results   Component Value Date    CALCIUM 9.2 08/26/2022    PHOS 4.8 (H) 08/09/2022     Lab Results   Component Value Date    K 4.2 08/26/2022     Lab Results   Component Value Date    LABPROT 11.0 08/07/2022    ALBUMIN 3.0 (L) 08/26/2022     Lab Results   Component Value Date    HGBA1C 6.6 (H) 04/04/2023       BMP  @[id number](GLU,NA,K,Cl,CO2,BUN,Creatinine,Calcium,MG)@      Radiology:  Reviewed and noted in plan where applicable- Please see chart for full radiology data.            ASSESSMENT/PLAN:     Patient Active Problem List   Diagnosis    Constipation    Hypertension associated with diabetes    ESRD (end stage renal disease)    Proteinuria    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, with long-term current use of insulin    Cataracta brunescens of right eye    Bilateral carotid artery disease    Aortic atherosclerosis    Type 2 diabetes mellitus with circulatory disorder, with long-term current use of insulin    History of CVA (cerebrovascular accident)    Hyperparathyroidism    Vitamin D deficiency    DDD (degenerative disc disease), lumbar    Anemia in chronic kidney disease, on chronic dialysis    Iron deficiency anemia due to chronic blood loss    Hyperlipidemia associated with type 2 diabetes mellitus    Pulmonary hypertension    CAD (coronary artery disease)    Exudative age-related macular degeneration of left eye with active choroidal neovascularization    Non-rheumatic mitral regurgitation    Non-rheumatic tricuspid valve insufficiency    Age-related nuclear cataract of left eye    DM type 2 with diabetic peripheral neuropathy    Unspecified inflammatory spondylopathy, lumbar region    Convulsions    Hemiplegia    Other specified complication of vascular prosthetic devices, implants and grafts, initial encounter    Chronic combined  systolic and diastolic heart failure    Metabolic acidosis    S/P CABG (coronary artery bypass graft)    COVID    Skin ulcer of toe of right foot with fat layer exposed    Type 2 diabetes mellitus with diabetic peripheral angiopathy without gangrene, with long-term current use of insulin    Allergic rhinitis    Other reduced mobility    Nausea & vomiting         PLAN:      Assessment and plan:    1.  ESRD: Doing very well on  dialysis. We will continue hemodialysis treatments three   times a week, four hours each treatment, maintaining a URR of 70% or greater and   a Kt/V of 1.20.  Currently, the patient is stable.    2.  Anemia: .  We will check hemoglobin at least monthly,   target range 10 to 11, transferrin saturation monthly, and ferritin quarterly.    We will dose Epogen and iron according to monthly blood work and according to   protocol.    3 . Hypertension.  The patient's blood pressure generally stabilizes during   Treatment. Currently controlled with current medications, sodium and fluid   restrictions and dialysis prescription. BP is much better controlled    4.  Hyperparathyroidism secondary to renal origin.  We will check intact PTH on   a quarterly basis.  We will dose vitamin D according to blood work and protocol.      JACK Curtis

## 2023-06-26 ENCOUNTER — OFFICE VISIT (OUTPATIENT)
Dept: CARDIOLOGY | Facility: CLINIC | Age: 78
End: 2023-06-26
Payer: MEDICARE

## 2023-06-26 ENCOUNTER — HOSPITAL ENCOUNTER (OUTPATIENT)
Dept: CARDIOLOGY | Facility: HOSPITAL | Age: 78
Discharge: HOME | End: 2023-06-26
Payer: MEDICARE

## 2023-06-26 VITALS
OXYGEN SATURATION: 96 % | BODY MASS INDEX: 23.71 KG/M2 | DIASTOLIC BLOOD PRESSURE: 58 MMHG | HEART RATE: 64 BPM | HEIGHT: 64 IN | WEIGHT: 138.88 LBS | SYSTOLIC BLOOD PRESSURE: 160 MMHG

## 2023-06-26 DIAGNOSIS — I25.118 CORONARY ARTERY DISEASE OF NATIVE ARTERY OF NATIVE HEART WITH STABLE ANGINA PECTORIS: ICD-10-CM

## 2023-06-26 DIAGNOSIS — I34.0 NON-RHEUMATIC MITRAL REGURGITATION: ICD-10-CM

## 2023-06-26 DIAGNOSIS — Z95.1 S/P CABG (CORONARY ARTERY BYPASS GRAFT): ICD-10-CM

## 2023-06-26 DIAGNOSIS — I65.23 BILATERAL CAROTID ARTERY STENOSIS: ICD-10-CM

## 2023-06-26 DIAGNOSIS — I15.2 HYPERTENSION ASSOCIATED WITH DIABETES: Primary | ICD-10-CM

## 2023-06-26 DIAGNOSIS — E11.59 HYPERTENSION ASSOCIATED WITH DIABETES: Primary | ICD-10-CM

## 2023-06-26 DIAGNOSIS — I50.42 CHRONIC COMBINED SYSTOLIC AND DIASTOLIC HEART FAILURE: ICD-10-CM

## 2023-06-26 DIAGNOSIS — N18.6 ESRD (END STAGE RENAL DISEASE): ICD-10-CM

## 2023-06-26 DIAGNOSIS — I70.0 AORTIC ATHEROSCLEROSIS: ICD-10-CM

## 2023-06-26 DIAGNOSIS — E11.69 HYPERLIPIDEMIA ASSOCIATED WITH TYPE 2 DIABETES MELLITUS: ICD-10-CM

## 2023-06-26 DIAGNOSIS — E78.5 HYPERLIPIDEMIA ASSOCIATED WITH TYPE 2 DIABETES MELLITUS: ICD-10-CM

## 2023-06-26 DIAGNOSIS — E11.42 DM TYPE 2 WITH DIABETIC PERIPHERAL NEUROPATHY: ICD-10-CM

## 2023-06-26 DIAGNOSIS — E11.59 HYPERTENSION ASSOCIATED WITH DIABETES: ICD-10-CM

## 2023-06-26 DIAGNOSIS — I36.1 NON-RHEUMATIC TRICUSPID VALVE INSUFFICIENCY: ICD-10-CM

## 2023-06-26 DIAGNOSIS — I15.2 HYPERTENSION ASSOCIATED WITH DIABETES: ICD-10-CM

## 2023-06-26 PROCEDURE — 1126F PR PAIN SEVERITY QUANTIFIED, NO PAIN PRESENT: ICD-10-PCS | Mod: CPTII,S$GLB,,

## 2023-06-26 PROCEDURE — 1159F PR MEDICATION LIST DOCUMENTED IN MEDICAL RECORD: ICD-10-PCS | Mod: CPTII,S$GLB,,

## 2023-06-26 PROCEDURE — 3288F FALL RISK ASSESSMENT DOCD: CPT | Mod: CPTII,S$GLB,,

## 2023-06-26 PROCEDURE — 1126F AMNT PAIN NOTED NONE PRSNT: CPT | Mod: CPTII,S$GLB,,

## 2023-06-26 PROCEDURE — 93010 ELECTROCARDIOGRAM REPORT: CPT | Mod: ,,, | Performed by: STUDENT IN AN ORGANIZED HEALTH CARE EDUCATION/TRAINING PROGRAM

## 2023-06-26 PROCEDURE — 93005 ELECTROCARDIOGRAM TRACING: CPT

## 2023-06-26 PROCEDURE — 93010 EKG 12-LEAD: ICD-10-PCS | Mod: ,,, | Performed by: STUDENT IN AN ORGANIZED HEALTH CARE EDUCATION/TRAINING PROGRAM

## 2023-06-26 PROCEDURE — 1101F PR PT FALLS ASSESS DOC 0-1 FALLS W/OUT INJ PAST YR: ICD-10-PCS | Mod: CPTII,S$GLB,,

## 2023-06-26 PROCEDURE — 1157F PR ADVANCE CARE PLAN OR EQUIV PRESENT IN MEDICAL RECORD: ICD-10-PCS | Mod: CPTII,S$GLB,,

## 2023-06-26 PROCEDURE — 1159F MED LIST DOCD IN RCRD: CPT | Mod: CPTII,S$GLB,,

## 2023-06-26 PROCEDURE — 1157F ADVNC CARE PLAN IN RCRD: CPT | Mod: CPTII,S$GLB,,

## 2023-06-26 PROCEDURE — 99214 OFFICE O/P EST MOD 30 MIN: CPT | Mod: S$GLB,,,

## 2023-06-26 PROCEDURE — 3078F PR MOST RECENT DIASTOLIC BLOOD PRESSURE < 80 MM HG: ICD-10-PCS | Mod: CPTII,S$GLB,,

## 2023-06-26 PROCEDURE — 99214 PR OFFICE/OUTPT VISIT, EST, LEVL IV, 30-39 MIN: ICD-10-PCS | Mod: S$GLB,,,

## 2023-06-26 PROCEDURE — 99999 PR PBB SHADOW E&M-EST. PATIENT-LVL IV: CPT | Mod: PBBFAC,,,

## 2023-06-26 PROCEDURE — 3288F PR FALLS RISK ASSESSMENT DOCUMENTED: ICD-10-PCS | Mod: CPTII,S$GLB,,

## 2023-06-26 PROCEDURE — 3077F SYST BP >= 140 MM HG: CPT | Mod: CPTII,S$GLB,,

## 2023-06-26 PROCEDURE — 3078F DIAST BP <80 MM HG: CPT | Mod: CPTII,S$GLB,,

## 2023-06-26 PROCEDURE — 3077F PR MOST RECENT SYSTOLIC BLOOD PRESSURE >= 140 MM HG: ICD-10-PCS | Mod: CPTII,S$GLB,,

## 2023-06-26 PROCEDURE — 1101F PT FALLS ASSESS-DOCD LE1/YR: CPT | Mod: CPTII,S$GLB,,

## 2023-06-26 PROCEDURE — 99999 PR PBB SHADOW E&M-EST. PATIENT-LVL IV: ICD-10-PCS | Mod: PBBFAC,,,

## 2023-06-26 RX ORDER — TELMISARTAN 20 MG/1
20 TABLET ORAL DAILY
Qty: 90 TABLET | Refills: 11 | Status: SHIPPED | OUTPATIENT
Start: 2023-06-26 | End: 2023-08-04

## 2023-06-26 RX ORDER — ASPIRIN 81 MG/1
81 TABLET ORAL DAILY
Qty: 90 TABLET | Refills: 6 | Status: ON HOLD | OUTPATIENT
Start: 2023-06-26 | End: 2024-03-16

## 2023-06-26 RX ORDER — METOPROLOL TARTRATE 50 MG/1
50 TABLET ORAL 2 TIMES DAILY
Qty: 180 TABLET | Refills: 11 | Status: ON HOLD | OUTPATIENT
Start: 2023-06-26 | End: 2024-03-16

## 2023-06-26 NOTE — PROGRESS NOTES
Subjective:   Patient ID:  Avril Monzon is a 78 y.o. female who presents for evaluation of No chief complaint on file.      HPI78y/o AA female with PMHx of CAD, PE, HTN, aortic atherosclerosis, chronic diastolic heart failure, HLD, HTN, CABG, DM who presents to clinic today for follow up. Has been doing well clinically has no chest pain, still has hypotension episodes with dialysis. Compliant with medications at home, right toes heeling well, seeing podiatry. No fever chills syncope near syncope chf symptoms or angina.        Past Medical History:   Diagnosis Date    Acute hypoxemic respiratory failure 11/26/2018    Anemia     Arthritis     back, hand, knees    Back pain     Cataract     CKD stage G4/A3, GFR 15 - 29 and albumin creatinine ratio >300 mg/g     GFR 40% Jan 2014 and 33% ub 3/2014 ([facility])    Coronary artery disease involving native coronary artery of native heart 11/30/2018    Diabetic retinopathy     DM (diabetes mellitus) type II controlled, neurological manifestation     Exudative age-related macular degeneration of left eye with active choroidal neovascularization 2/5/2019    GERD (gastroesophageal reflux disease)     Glaucoma     Heart failure     Hyperlipidemia     Hypertension     Non-ST elevation MI (NSTEMI) 11/28/2018    NSTEMI (non-ST elevated myocardial infarction) 11/27/2018    Peripheral neuropathy     Polyneuropathy     Proteinuria     >6 mo     Seizures     BRG 1/2014 -dick CT NRI showed small vessel    Skin ulcer of toe of right foot with fat layer exposed 10/14/2022    Stroke 2012,2014    Tobacco dependence     Type 2 diabetes with peripheral circulatory disorder, controlled        Past Surgical History:   Procedure Laterality Date    BREAST LUMPECTOMY Left     benign, when patient was 15 years old    BREAST MASS EXCISION      ,benign, age 15.    CATHETERIZATION OF BOTH LEFT AND RIGHT HEART N/A 11/28/2018    Procedure: CATHETERIZATION, HEART, BOTH LEFT AND RIGHT;  Surgeon: Michelle Holman,  MD;  Location: [facility] CATH LAB;  Service: Cardiology;  Laterality: N/A;    CATHETERIZATION OF BOTH LEFT AND RIGHT HEART N/A 2018    Procedure: CATHETERIZATION, HEART, BOTH LEFT AND RIGHT;  Surgeon: Michelle Holman MD;  Location: [facility] CATH LAB;  Service: Cardiology;  Laterality: N/A;    CORONARY ARTERY BYPASS GRAFT      CORONARY ARTERY BYPASS GRAFT (CABG) N/A 2022    Procedure: CORONARY ARTERY BYPASS GRAFT (CABG);  Surgeon: Sanju Locke MD;  Location: [facility] OR;  Service: Cardiothoracic;  Laterality: N/A;  2-VESSEL PUMP ASSIST WITH EPI-AORTIC ULTRASOUND    ENDOSCOPIC HARVEST OF VEIN Left 2022    Procedure: SURGICAL PROCUREMENT, VEIN, ENDOSCOPIC;  Surgeon: Sanju Locke MD;  Location: [facility] OR;  Service: Cardiothoracic;  Laterality: Left;    HYSTERECTOMY  1982    INJECTION OF ANESTHETIC AGENT AROUND MULTIPLE INTERCOSTAL NERVES N/A 2022    Procedure: BLOCK, NERVE, INTERCOSTAL, 2 OR MORE;  Surgeon: Sanju Locke MD;  Location: [facility] OR;  Service: Cardiothoracic;  Laterality: N/A;  PARASTERNAL NERVE BLOCK    INSERTION OF SHUNT      LEFT HEART CATHETERIZATION Left 12/3/2018    Procedure: CATHETERIZATION, HEART, LEFT;  Surgeon: Michelle Holman MD;  Location: [facility] CATH LAB;  Service: Cardiology;  Laterality: Left;  LAD ATHERECTOMY STENT/ FEMORAL APPROACH    LEFT HEART CATHETERIZATION Left 2022    Procedure: CATHETERIZATION, HEART, LEFT;  Surgeon: Abhi Sloan MD;  Location: [facility] CATH LAB;  Service: Cardiology;  Laterality: Left;    OOPHORECTOMY      wrist cyst Right 1980s    dorsal wrist cyst       Social History     Tobacco Use    Smoking status: Former     Packs/day: 1.50     Years: 30.00     Pack years: 45.00     Types: Cigarettes     Quit date: 1990     Years since quittin.4    Smokeless tobacco: Former   Substance Use Topics    Alcohol use: No     Alcohol/week: 0.0 standard drinks    Drug use: No       Family History   Problem Relation Age of Onset    Diabetes Mother      "Hyperlipidemia Mother     Hypertension Mother     Heart disease Mother         MI    Muscular dystrophy Son     Cancer Maternal Grandmother         colon       Current Outpatient Medications on File Prior to Visit   Medication Sig Dispense Refill    ammonium lactate 12 % Crea Apply 1 application topically 2 (two) times daily. For calluses 140 g 2    azelastine (ASTELIN) 137 mcg (0.1 %) nasal spray 1 spray (137 mcg total) by Nasal route 2 (two) times daily. 30 mL 0    cyanocobalamin (VITAMIN B-12) 1000 MCG tablet Take 100 mcg by mouth once daily.      fluocinonide (LIDEX) 0.05 % external solution Apply topically 2 (two) times daily. Use on scalp 60 mL 5    folic acid (FOLVITE) 1 MG tablet Take 1 mg by mouth once daily.      insulin aspart protamine-insulin aspart (NOVOLOG MIX 70-30FLEXPEN U-100) 100 unit/mL (70-30) InPn pen Inject 20 units into skin before breakfast, 8 units before dinner. On dialysis days, give 10 units before dinner. 15 mL 3    loratadine (CLARITIN) 10 mg tablet Take 1 tablet (10 mg total) by mouth once daily. 30 tablet 3    mupirocin (BACTROBAN) 2 % ointment Apply topically 2 (two) times daily. 22 g 0    ondansetron (ZOFRAN-ODT) 4 MG TbDL Take 1 tablet (4 mg total) by mouth every 8 (eight) hours as needed (nausea/vomiting). 12 tablet 0    rosuvastatin (CRESTOR) 20 MG tablet Take 1 tablet (20 mg total) by mouth once daily. 90 tablet 3    sevelamer carbonate (RENVELA) 800 mg Tab Take 3 tablets (2,400 mg) by mouth three times a day with meals and 2 tablets (1,600 mg) by mouth with snacks 1170 tablet 3    blood sugar diagnostic (TRUE METRIX GLUCOSE TEST STRIP) Strp 1 strip by Misc.(Non-Drug; Combo Route) route 3 (three) times daily. 100 each 11    blood-glucose meter (TRUE METRIX GLUCOSE METER) Misc Use as instructed 1 each 0    lancets (TRUEPLUS LANCETS) 33 gauge Misc 1 lancet by Misc.(Non-Drug; Combo Route) route 3 (three) times daily. 100 each 11    pen needle, diabetic 32 gauge x 5/32" Ndle 1 each " by Misc.(Non-Drug; Combo Route) route 2 (two) times a day. 100 each 11    [DISCONTINUED] isosorbide-hydrALAZINE 20-37.5 mg (BIDIL) 20-37.5 mg Tab Take 1 tablet by mouth 3 (three) times daily. 90 tablet 1     No current facility-administered medications on file prior to visit.      Wt Readings from Last 3 Encounters:   06/26/23 63 kg (138 lb 14.2 oz)   05/30/23 59 kg (130 lb)   04/27/23 59 kg (130 lb)     Temp Readings from Last 3 Encounters:   04/27/23 97.9 °F (36.6 °C) (Oral)   04/20/23 98 °F (36.7 °C) (Oral)   02/20/23 98.6 °F (37 °C) (Oral)     BP Readings from Last 3 Encounters:   06/26/23 (!) 160/58   04/27/23 (!) 164/58   04/20/23 (!) 166/50     Pulse Readings from Last 3 Encounters:   06/26/23 64   02/20/23 73   01/24/23 69        Review of Systems   Constitutional: Positive for malaise/fatigue.   HENT: Negative.     Eyes: Negative.    Cardiovascular: Negative.    Respiratory: Negative.     Skin: Negative.    Musculoskeletal: Negative.    Gastrointestinal: Negative.    Genitourinary: Negative.    Neurological: Negative.    Psychiatric/Behavioral: Negative.       Objective:   Physical Exam  Vitals and nursing note reviewed.   Constitutional:       Appearance: Normal appearance.   HENT:      Head: Normocephalic.   Eyes:      Pupils: Pupils are equal, round, and reactive to light.   Cardiovascular:      Rate and Rhythm: Normal rate and regular rhythm.      Pulses:           Carotid pulses are  on the right side with bruit and  on the left side with bruit.     Heart sounds: Normal heart sounds, S1 normal and S2 normal. No murmur heard.    No S3 or S4 sounds.   Pulmonary:      Effort: Pulmonary effort is normal.      Breath sounds: Normal breath sounds.   Abdominal:      General: Bowel sounds are normal.      Palpations: Abdomen is soft.   Musculoskeletal:         General: Normal range of motion.      Cervical back: Normal range of motion.   Skin:     Capillary Refill: Capillary refill takes less than 2 seconds.    Neurological:      General: No focal deficit present.      Mental Status: She is alert and oriented to person, place, and time.   Psychiatric:         Mood and Affect: Mood normal.         Behavior: Behavior normal.         Thought Content: Thought content normal.       Lab Results   Component Value Date    CHOL 171 07/13/2022    CHOL 246 (H) 10/19/2021    CHOL 239 (H) 01/26/2021     Lab Results   Component Value Date    HDL 59 07/13/2022    HDL 62 10/19/2021    HDL 82 (H) 01/26/2021     Lab Results   Component Value Date    LDLCALC 102.8 07/13/2022    LDLCALC 159.6 (H) 10/19/2021    LDLCALC 136.2 01/26/2021     Lab Results   Component Value Date    TRIG 46 07/13/2022    TRIG 122 10/19/2021    TRIG 104 01/26/2021     Lab Results   Component Value Date    CHOLHDL 34.5 07/13/2022    CHOLHDL 25.2 10/19/2021    CHOLHDL 34.3 01/26/2021       Chemistry        Component Value Date/Time     (L) 08/26/2022 1222    K 4.2 08/26/2022 1222    CL 99 08/26/2022 1222    CO2 23 08/26/2022 1222    BUN 36 (H) 08/26/2022 1222    CREATININE 5.0 (H) 08/26/2022 1222     (H) 08/26/2022 1222        Component Value Date/Time    CALCIUM 9.2 08/26/2022 1222    ALKPHOS 89 08/26/2022 1222    AST 18 08/26/2022 1222    ALT 12 08/26/2022 1222    BILITOT 0.7 08/26/2022 1222    ESTGFRAFRICA 8 (A) 07/27/2022 0433    ESTGFRAFRICA 8 (A) 07/27/2022 0433    EGFRNONAA 7 (A) 07/27/2022 0433    EGFRNONAA 7 (A) 07/27/2022 0433          Lab Results   Component Value Date    TSH 1.792 01/26/2021     Lab Results   Component Value Date    INR 1.0 08/07/2022    INR 1.3 (H) 07/21/2022    INR 1.4 (H) 07/20/2022     @RESUFAST(WBC,HGB,HCT,MCV,PLT)  @LABRCNTIP(BNP,BNPTRIAGEBLO)@  CrCl cannot be calculated (Patient's most recent lab result is older than the maximum 7 days allowed.).     Results for orders placed during the hospital encounter of 07/12/22    Echo    Interpretation Summary  · Concentric hypertrophy and normal systolic function.  · The  estimated ejection fraction is 55%.  · Normal left ventricular diastolic function.  · Normal right ventricular size with normal right ventricular systolic function.  · Moderate tricuspid regurgitation.  · There is pulmonary hypertension.     Results for orders placed during the hospital encounter of 01/04/22    Nuclear Stress - Cardiology Interpreted    Interpretation Summary    Abnormal myocardial perfusion scan.    There is a severe intensity, moderate to large sized fixed defect consistent with scar in the anteroapical wall(s).    The gated perfusion images showed an ejection fraction of 64% at rest. The gated perfusion images showed an ejection fraction of 65% post stress.    The EKG portion of this study is negative for ischemia.    During stress, rare PVCs are noted.     Assessment:      1. Hypertension associated with diabetes    2. Bilateral carotid artery stenosis    3. Aortic atherosclerosis    4. Hyperlipidemia associated with type 2 diabetes mellitus    5. Coronary artery disease of native artery of native heart with stable angina pectoris    6. Non-rheumatic mitral regurgitation    7. Non-rheumatic tricuspid valve insufficiency    8. Chronic combined systolic and diastolic heart failure    9. S/P CABG (coronary artery bypass graft)    10. DM type 2 with diabetic peripheral neuropathy    11. ESRD (end stage renal disease)        Plan:   Hypertension associated with diabetes  -     metoprolol tartrate (LOPRESSOR) 50 MG tablet; Take 1 tablet (50 mg total) by mouth 2 (two) times daily.  Dispense: 180 tablet; Refill: 11  -     telmisartan (MICARDIS) 20 MG Tab; Take 1 tablet (20 mg total) by mouth once daily.  Dispense: 90 tablet; Refill: 11    Bilateral carotid artery stenosis    Aortic atherosclerosis    Hyperlipidemia associated with type 2 diabetes mellitus  -     LIPID PANEL; Future; Expected date: 06/26/2023    Coronary artery disease of native artery of native heart with stable angina pectoris  -      aspirin (ECOTRIN) 81 MG EC tablet; Take 1 tablet (81 mg total) by mouth once daily.  Dispense: 90 tablet; Refill: 6  -     metoprolol tartrate (LOPRESSOR) 50 MG tablet; Take 1 tablet (50 mg total) by mouth 2 (two) times daily.  Dispense: 180 tablet; Refill: 11  -     telmisartan (MICARDIS) 20 MG Tab; Take 1 tablet (20 mg total) by mouth once daily.  Dispense: 90 tablet; Refill: 11  -     HEMOGLOBIN A1C; Future; Expected date: 06/26/2023  -     LIPID PANEL; Future; Expected date: 06/26/2023  -     Comprehensive Metabolic Panel; Future; Expected date: 06/26/2023    Non-rheumatic mitral regurgitation    Non-rheumatic tricuspid valve insufficiency    Chronic combined systolic and diastolic heart failure  -     metoprolol tartrate (LOPRESSOR) 50 MG tablet; Take 1 tablet (50 mg total) by mouth 2 (two) times daily.  Dispense: 180 tablet; Refill: 11  -     telmisartan (MICARDIS) 20 MG Tab; Take 1 tablet (20 mg total) by mouth once daily.  Dispense: 90 tablet; Refill: 11  -     Comprehensive Metabolic Panel; Future; Expected date: 06/26/2023    S/P CABG (coronary artery bypass graft)    DM type 2 with diabetic peripheral neuropathy  -     HEMOGLOBIN A1C; Future; Expected date: 06/26/2023    ESRD (end stage renal disease)  -     telmisartan (MICARDIS) 20 MG Tab; Take 1 tablet (20 mg total) by mouth once daily.  Dispense: 90 tablet; Refill: 11      Cont current medications, RF modifications, low fat high fiber diet, low Na diet, daily exercise as tolerated    CMP, A1C, Lipid  RTC 6 months with Dr. River Sosa, [facility], Cardiology

## 2023-07-10 ENCOUNTER — TELEPHONE (OUTPATIENT)
Dept: PODIATRY | Facility: CLINIC | Age: 78
End: 2023-07-10
Payer: MEDICARE

## 2023-07-10 NOTE — TELEPHONE ENCOUNTER
LVM for the patient/daughter to schedule Podiatry appt    ----- Message from Ana Hill sent at 7/10/2023  9:59 AM CDT -----  Regarding: EP Appointment  Contact: Avril Mackenzie want to be seen by the provider in regards to ingrown toenail and goes to dialysis on Monday, Wednesday, and Friday until after 3. She would like [facility]    Avril can be reached at [phone] (home)  [phone] for her daughter.     Thanks

## 2023-07-11 NOTE — PROGRESS NOTES
PCP: Rose Parks MD    Subjective:     Chief Complaint: Diabetes Consult    HPI: 77 y.o.  female for diabetes consult.   Previously managed by JACK Pierce.   Last clinic visit 1/2016.  Type II diabetes since age 44.  Comorbidities: HTN, HLD, CHF, ESRD, CVA, MI, Dialysis Use - Sched MWF 7a-12noon, Legally blind.  ESRD on dialysis x 2 years    Family History of Type 1/2 DM: mother  Known diabetes complications:  DKA/HHS: no  Retinopathy: yes  Peripheral neuropathy: yes  Nephropathy: yes    Denies recent hospital admissions or ER visits.   Denies having hypoglycemia.   Endorses diabetes related symptoms: Tingling in Lower Extremities. Both fingers    Blood glucose monitoring via fingerstick: 1 x/day   Unable to recall readings.   No BG log for review.    Activity: Sedentary  Diet: 2 meals/day; infrequent snacks/day.    Medication compliance all of the time, diet compliance most of the time.   To be enrolled in diabetes education classes.     Past failed treatment:     Current DM Medications:   - Humulin 70/30,  28 units in am, 8 units in the evening    Allergies  Review of patient's allergies indicates:   Allergen Reactions    Codeine Swelling    Darvon [propoxyphene] Swelling    Atorvastatin      Muscle twitching     Labs Reviewed:  Her most recent A1C is:  Lab Results   Component Value Date    HGBA1C 8.6 (H) 10/19/2021    HGBA1C 7.3 04/26/2021    HGBA1C 7.0 (H) 01/26/2021     Her most recent BMP is:  Lab Results   Component Value Date     12/21/2021    K 4.5 12/21/2021     12/21/2021    CO2 21 (L) 12/21/2021    BUN 25 (H) 12/21/2021    CREATININE 4.3 (H) 12/21/2021    CALCIUM 8.7 12/21/2021    ANIONGAP 13 12/21/2021    ESTGFRAFRICA 11 (A) 12/21/2021    EGFRNONAA 10 (A) 12/21/2021       No results found for: CPEPTIDE  No results found for: GLUTAMICACID    Body mass index is 21.79 kg/m².    Wt Readings from Last 3 Encounters:   03/08/22 0822 59.4 kg (130 lb 15.3 oz)   02/17/22  1030 60.7 kg (133 lb 13.1 oz)   22 1326 60.5 kg (133 lb 6.1 oz)      Her blood sugar in clinic today is: 441    Lab Results   Component Value Date    POCGLU 74 2016     Diabetes Management Status  Statin: Not taking  ACE/ARB: Not taking    Screening or Prevention Patient's value Goal Complete/Controlled?   HgA1C Testing and Control   Lab Results   Component Value Date    HGBA1C 8.6 (H) 10/19/2021      Annually/Less than 8% No   Lipid profile : 10/25/2021 Annually Yes   LDL control Lab Results   Component Value Date    LDLCALC 159.6 (H) 10/19/2021    Annually/Less than 100 mg/dl  No   Nephropathy screening Lab Results   Component Value Date    MICALBCREAT 1244.0 (H) 2018     Lab Results   Component Value Date    PROTEINUA 1+ (A) 2020    Annually No   Blood pressure BP Readings from Last 1 Encounters:   22 139/69    Less than 140/90 Yes   Dilated retinal exam : 2021 Annually Yes    Foot exam   : 2022 Annually Yes     Social History     Socioeconomic History    Marital status:    Tobacco Use    Smoking status: Former Smoker     Packs/day: 1.50     Years: 30.00     Pack years: 45.00     Types: Cigarettes     Quit date: 1990     Years since quittin.1    Smokeless tobacco: Former User   Substance and Sexual Activity    Alcohol use: No     Alcohol/week: 0.0 standard drinks    Drug use: No    Sexual activity: Not Currently     Past Medical History:   Diagnosis Date    Acute hypoxemic respiratory failure 2018    Anemia     Arthritis     back, hand, knees    Back pain     Cataract     CKD stage G4/A3, GFR 15 - 29 and albumin creatinine ratio >300 mg/g     GFR 40% 2014 and 33% ub 3/2014 ([facility])    Coronary artery disease involving native coronary artery of native heart 2018    Diabetic retinopathy     DM (diabetes mellitus) type II controlled, neurological manifestation     Exudative age-related macular degeneration of left eye with active  choroidal neovascularization 2/5/2019    GERD (gastroesophageal reflux disease)     Glaucoma     Heart failure     Hyperlipidemia     Hypertension     Non-ST elevation MI (NSTEMI) 11/28/2018    NSTEMI (non-ST elevated myocardial infarction) 11/27/2018    Peripheral neuropathy     Polyneuropathy     Proteinuria     >6 mo     Seizures     [facility] 1/2014 -dick CT [facility] showed small vessel    Stroke 2012,2014    Tobacco dependence     Type 2 diabetes with peripheral circulatory disorder, controlled      Review of Systems   Constitutional: Negative for activity change, appetite change, fatigue and unexpected weight change.   HENT: Negative for dental problem and trouble swallowing.    Eyes: Negative for visual disturbance.   Respiratory: Negative for chest tightness and shortness of breath.    Cardiovascular: Negative for chest pain, palpitations and leg swelling.   Gastrointestinal: Negative for abdominal pain, constipation, diarrhea, nausea and vomiting.   Endocrine: Negative for polydipsia, polyphagia and polyuria.   Genitourinary: Negative for difficulty urinating, dysuria, frequency and urgency.        Negative yeast infection   Musculoskeletal: Negative for gait problem, myalgias and neck pain.   Integumentary:  Negative for rash and wound.   Allergic/Immunologic: Negative.    Neurological: Positive for numbness (intermittent both feet). Negative for dizziness, syncope, weakness and headaches.        Int tingling BLE, hands   Hematological: Negative.    Psychiatric/Behavioral: Negative for confusion and sleep disturbance.   All other systems reviewed and are negative.       Objective:      Physical Exam  Vitals reviewed.   Constitutional:       Appearance: Normal appearance.   HENT:      Head: Normocephalic and atraumatic.   Eyes:      General: Vision grossly intact.      Extraocular Movements: Extraocular movements intact.      Pupils: Pupils are equal, round, and reactive to light.   Neck:      Thyroid: No  thyroid mass or thyroid tenderness.   Cardiovascular:      Rate and Rhythm: Normal rate and regular rhythm.      Pulses: Normal pulses.           Radial pulses are 2+ on the right side and 2+ on the left side.        Dorsalis pedis pulses are 2+ on the right side and 2+ on the left side.      Heart sounds: Normal heart sounds.      Arteriovenous access: left arteriovenous access is present.     Comments: Left upper arm AVF + thrill, + bruit  Pulmonary:      Effort: Pulmonary effort is normal.      Breath sounds: Normal breath sounds.   Abdominal:      General: Bowel sounds are normal.      Palpations: Abdomen is soft.   Musculoskeletal:         General: Normal range of motion.      Cervical back: Normal range of motion and neck supple.      Right lower leg: No edema.      Left lower leg: No edema.      Right foot: No deformity or bunion.      Left foot: No deformity or bunion.   Feet:      Right foot:      Protective Sensation: 6 sites tested. 6 sites sensed.      Skin integrity: Dry skin present. No ulcer, skin breakdown or callus.      Toenail Condition: Right toenails are abnormally thick.      Left foot:      Protective Sensation: 6 sites tested. 6 sites sensed.      Skin integrity: Dry skin present. No ulcer, skin breakdown or callus.      Toenail Condition: Left toenails are abnormally thick.      Comments: Pinprick exam completed with 10-g monofilament. Vibration sensation intact.  Lymphadenopathy:      Cervical: No cervical adenopathy.   Skin:     General: Skin is warm and dry.      Findings: No rash or wound.      Comments: Negative for acanthosis nigricans. Well-healed SQ injection sites.   Neurological:      Mental Status: She is alert and oriented to person, place, and time.      Coordination: Coordination is intact.      Gait: Gait is intact.   Psychiatric:         Attention and Perception: Attention normal.         Mood and Affect: Mood normal.         Speech: Speech normal.         Behavior: Behavior  normal. Behavior is cooperative.         Thought Content: Thought content normal.         Cognition and Memory: Cognition normal.         Assessment / Plan:     Diagnoses and all orders for this visit:    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, with long-term current use of insulin  -     POCT Glucose, Hand-Held Device  -     Hemoglobin A1C; Future  -     Ambulatory referral/consult to Diabetes Education; Future    Hypertension associated with diabetes    Hyperlipidemia associated with type 2 diabetes mellitus    History of CVA (cerebrovascular accident)    ESRD (end stage renal disease)    Hyperglycemia due to diabetes mellitus   - BG in clinic this morning 441   - Missed am insulin dose   - Denies any complaints   - Instructed to resume insulin as soon as she can   - Check BG 2 hrs after insulin administration   - Will call midafternoon to check BG   - Pt verbalized understanding    Additional Plan Details:  - Condition: uncontrolled, most recent A1c of 8.6% was completed 5 months ago.   - Monitor blood glucose:  2x daily.Goals reviewed. Maintain BG log. Instructed pt to send BG logs in 1 week to determine insulin dose adjustment.  - Hypoglycemia protocol: Reviewed and risk discussed.  Notify if BG readings below 80. Protocol printout provided.   - Diet: Limit carbohydrate intake 30-45 grams/meal, 3x daily and 15 grams/snack, limit 2/day.   - Activity: Recommend at least 150 minutes of exercise in a week.  - BP and LDL: Recommend lifestyle modifications and follow up with PCP for management.   - Medication Changes:    - Continue Humulin 70/30 28 units in am and 8 units in the evening    - Lab results: A1C discussed.  - Health Maintenance standards addressed today: Foot Exam - completed today and documented in physical exam with feedback to patient about proper diabetic foot care and findings, Diabetic Care Specialist appointment to be scheduled today and A1c to be scheduled.    - Risks of uncontrolled DM explained. Importance of routine foot and eye exams reviewed. Scheduled as appropriate.  -The patient was explained the above plan and given opportunity to ask questions.  She understands, chooses and consents to this plan and accepts all the risks, which include but are not limited to the risks mentioned above.   - Labs ordered as above.  - CDE referral/follow up: Scheduled  - Nurse visit: 1 week for Humulin 70/30 pen teaching. Audio call this afternoon to check on BG.   - Follow up: 1 month.      Charlotte T. Delacruz, FNP-C [facility]    A total of 60 minutes was spent in face to face time, of which over 50 % was spent in counseling patient on disease process, complications, treatment, and side effects of medications.                       Low Dose Naltrexone Counseling- I discussed with the patient the potential risks and side effects of low dose naltrexone including but not limited to: more vivid dreams, headaches, nausea, vomiting, abdominal pain, fatigue, dizziness, and anxiety.

## 2023-07-13 ENCOUNTER — TELEPHONE (OUTPATIENT)
Dept: PRIMARY CARE CLINIC | Facility: CLINIC | Age: 78
End: 2023-07-13
Payer: MEDICARE

## 2023-07-20 NOTE — PROGRESS NOTES
Subjective:      Patient ID: Avril Monzon is a 78 y.o. female.    Chief Complaint: Follow-up (Three month follow up. Pt is having vaginal discharge and odor, pt is fatigued and a bump on her chest. )      HPI  Not seen by me since 4/6/22.  Here for f/u medical problems.  BM only twice a week, hard, takes dulcolax.  Stays bloated all the time.  Feels pressure in the back.  Had CABG last summer.  HD on MWF.  No f/c/sw/cough. PND better with claritin.  No cp/palp/lightheaded.  NEW ALVARADO, when walks from 3 rooms across her house.  Relief with rest.  Makes urine, smells very bad for past month.  Hasn't take B12 supplement in years, hx being low.  No low sugars, on 70/30.  AC breakfast sugars 120s.      7/22:  Summary    Concentric hypertrophy and normal systolic function.  The estimated ejection fraction is 55%.  Normal left ventricular diastolic function.  Normal right ventricular size with normal right ventricular systolic function.  Moderate tricuspid regurgitation.  There is pulmonary hypertension.   ----------------------------------------------------------------------------------    HM: 10/20 fluvax, 1/21 covid vaccines/booster, 1/16 [id number], 5/14 [id number], 9/22 [id number], 12/21 TDaP, 2/21 sched BMD, 2/21 MMG, 2011 Cscope outside, 3/20 Eye Dr. Coello.     Review of Systems   Constitutional:  Negative for appetite change, chills, diaphoresis and fever.   HENT:  Negative for congestion, ear pain, rhinorrhea, sinus pressure and sore throat.    Respiratory:  Negative for cough, chest tightness and shortness of breath.    Cardiovascular:  Negative for chest pain and palpitations.   Gastrointestinal:  Negative for blood in stool, constipation, diarrhea, nausea and vomiting.   Genitourinary:  Negative for dysuria, frequency, hematuria, menstrual problem, urgency and vaginal discharge.   Musculoskeletal:  Negative for arthralgias.   Skin:  Negative for rash.   Neurological:  Negative for dizziness and headaches.  "  Psychiatric/Behavioral:  Negative for sleep disturbance. The patient is not nervous/anxious.          Objective:   BP (!) 136/52 (BP Location: Right arm)   Pulse 68   Temp 98.2 °F (36.8 °C) (Oral)   Ht 5' 4" (1.626 m)   Wt 62.6 kg (138 lb 1.6 oz)   LMP  (LMP Unknown)   SpO2 98%   BMI 23.70 kg/m²     Physical Exam  Constitutional:       Appearance: She is well-developed.   HENT:      Right Ear: External ear normal. Tympanic membrane is not injected.      Left Ear: External ear normal. Tympanic membrane is not injected.   Eyes:      Conjunctiva/sclera: Conjunctivae normal.   Neck:      Thyroid: No thyromegaly.   Cardiovascular:      Rate and Rhythm: Normal rate and regular rhythm.      Heart sounds: No murmur heard.     No friction rub. No gallop.   Pulmonary:      Effort: Pulmonary effort is normal.      Breath sounds: Normal breath sounds. No wheezing or rales.   Abdominal:      General: Bowel sounds are normal.      Palpations: Abdomen is soft. There is no mass.      Tenderness: There is no abdominal tenderness.   Musculoskeletal:      Cervical back: Normal range of motion and neck supple.   Lymphadenopathy:      Cervical: No cervical adenopathy.   Skin:     General: Skin is warm.      Findings: No rash.   Neurological:      Mental Status: She is alert and oriented to person, place, and time.          Latest Reference Range & Units 04/04/23 10:09   Hemoglobin A1C External 4.0 - 5.6 % 6.6 (H)   Estimated Avg Glucose 68 - 131 mg/dL 143 (H)     Assessment:       1. ALVARADO (dyspnea on exertion)    2. Coronary artery disease of native artery of native heart with stable angina pectoris    3. Type 2 diabetes mellitus with diabetic peripheral angiopathy without gangrene, with long-term current use of insulin    4. Constipation, unspecified constipation type    5. Malodorous urine    6. Malaise and fatigue    7. B12 deficiency    8. Pulmonary hypertension          Plan:     ALVARADO (dyspnea on exertion), Malaise and " fatigue x 1 month-  -     CBC Auto Differential; Future; Expected date: 08/03/2023  -     Comprehensive Metabolic Panel; Future; Expected date: 08/03/2023  -     TSH; Future; Expected date: 08/03/2023  -     B-TYPE NATRIURETIC PEPTIDE; Future; Expected date: 08/03/2023  -     Troponin I; Future; Expected date: 08/03/2023  -     X-Ray Chest PA And Lateral; Future; Expected date: 08/03/20  -     B12      Coronary artery disease of native artery of native heart with stable angina pectoris  -     Comprehensive Metabolic Panel  -     Lipid Panel  -     Hemoglobin A1C    Type 2 diabetes mellitus with diabetic peripheral angiopathy without gangrene, with long-term current use of insulin- doing well.  -     Comprehensive Metabolic Panel  -     Lipid Panel  -     Hemoglobin A1C    Constipation, unspecified constipation type- ?miralax, ask Nephrologist.  -     X-Ray Abdomen AP 1 View; Future; Expected date: 08/03/2023    Malodorous urine  -     Urinalysis, Reflex to Urine Culture Urine, Clean Catch; Future; Expected date: 08/03/2023

## 2023-08-03 ENCOUNTER — OFFICE VISIT (OUTPATIENT)
Dept: PRIMARY CARE CLINIC | Facility: CLINIC | Age: 78
End: 2023-08-03
Payer: MEDICARE

## 2023-08-03 VITALS
HEART RATE: 68 BPM | OXYGEN SATURATION: 98 % | WEIGHT: 138.13 LBS | DIASTOLIC BLOOD PRESSURE: 52 MMHG | SYSTOLIC BLOOD PRESSURE: 136 MMHG | HEIGHT: 64 IN | BODY MASS INDEX: 23.58 KG/M2 | TEMPERATURE: 98 F

## 2023-08-03 DIAGNOSIS — I27.20 PULMONARY HYPERTENSION: ICD-10-CM

## 2023-08-03 DIAGNOSIS — R82.90 MALODOROUS URINE: ICD-10-CM

## 2023-08-03 DIAGNOSIS — I25.118 CORONARY ARTERY DISEASE OF NATIVE ARTERY OF NATIVE HEART WITH STABLE ANGINA PECTORIS: ICD-10-CM

## 2023-08-03 DIAGNOSIS — E53.8 B12 DEFICIENCY: ICD-10-CM

## 2023-08-03 DIAGNOSIS — R53.81 MALAISE AND FATIGUE: ICD-10-CM

## 2023-08-03 DIAGNOSIS — R53.83 MALAISE AND FATIGUE: ICD-10-CM

## 2023-08-03 DIAGNOSIS — E11.51 TYPE 2 DIABETES MELLITUS WITH DIABETIC PERIPHERAL ANGIOPATHY WITHOUT GANGRENE, WITH LONG-TERM CURRENT USE OF INSULIN: ICD-10-CM

## 2023-08-03 DIAGNOSIS — K59.00 CONSTIPATION, UNSPECIFIED CONSTIPATION TYPE: ICD-10-CM

## 2023-08-03 DIAGNOSIS — R06.09 DOE (DYSPNEA ON EXERTION): Primary | ICD-10-CM

## 2023-08-03 DIAGNOSIS — Z79.4 TYPE 2 DIABETES MELLITUS WITH DIABETIC PERIPHERAL ANGIOPATHY WITHOUT GANGRENE, WITH LONG-TERM CURRENT USE OF INSULIN: ICD-10-CM

## 2023-08-03 PROCEDURE — 99214 OFFICE O/P EST MOD 30 MIN: CPT | Mod: HCNC,S$GLB,, | Performed by: INTERNAL MEDICINE

## 2023-08-03 PROCEDURE — 1159F PR MEDICATION LIST DOCUMENTED IN MEDICAL RECORD: ICD-10-PCS | Mod: HCNC,CPTII,S$GLB, | Performed by: INTERNAL MEDICINE

## 2023-08-03 PROCEDURE — 1101F PR PT FALLS ASSESS DOC 0-1 FALLS W/OUT INJ PAST YR: ICD-10-PCS | Mod: HCNC,CPTII,S$GLB, | Performed by: INTERNAL MEDICINE

## 2023-08-03 PROCEDURE — 1157F ADVNC CARE PLAN IN RCRD: CPT | Mod: HCNC,CPTII,S$GLB, | Performed by: INTERNAL MEDICINE

## 2023-08-03 PROCEDURE — 3078F PR MOST RECENT DIASTOLIC BLOOD PRESSURE < 80 MM HG: ICD-10-PCS | Mod: HCNC,CPTII,S$GLB, | Performed by: INTERNAL MEDICINE

## 2023-08-03 PROCEDURE — 3075F PR MOST RECENT SYSTOLIC BLOOD PRESS GE 130-139MM HG: ICD-10-PCS | Mod: HCNC,CPTII,S$GLB, | Performed by: INTERNAL MEDICINE

## 2023-08-03 PROCEDURE — 1159F MED LIST DOCD IN RCRD: CPT | Mod: HCNC,CPTII,S$GLB, | Performed by: INTERNAL MEDICINE

## 2023-08-03 PROCEDURE — 1101F PT FALLS ASSESS-DOCD LE1/YR: CPT | Mod: HCNC,CPTII,S$GLB, | Performed by: INTERNAL MEDICINE

## 2023-08-03 PROCEDURE — 99999 PR PBB SHADOW E&M-EST. PATIENT-LVL IV: ICD-10-PCS | Mod: PBBFAC,HCNC,, | Performed by: INTERNAL MEDICINE

## 2023-08-03 PROCEDURE — 3288F PR FALLS RISK ASSESSMENT DOCUMENTED: ICD-10-PCS | Mod: HCNC,CPTII,S$GLB, | Performed by: INTERNAL MEDICINE

## 2023-08-03 PROCEDURE — 99214 PR OFFICE/OUTPT VISIT, EST, LEVL IV, 30-39 MIN: ICD-10-PCS | Mod: HCNC,S$GLB,, | Performed by: INTERNAL MEDICINE

## 2023-08-03 PROCEDURE — 3288F FALL RISK ASSESSMENT DOCD: CPT | Mod: HCNC,CPTII,S$GLB, | Performed by: INTERNAL MEDICINE

## 2023-08-03 PROCEDURE — 3078F DIAST BP <80 MM HG: CPT | Mod: HCNC,CPTII,S$GLB, | Performed by: INTERNAL MEDICINE

## 2023-08-03 PROCEDURE — 1157F PR ADVANCE CARE PLAN OR EQUIV PRESENT IN MEDICAL RECORD: ICD-10-PCS | Mod: HCNC,CPTII,S$GLB, | Performed by: INTERNAL MEDICINE

## 2023-08-03 PROCEDURE — 99999 PR PBB SHADOW E&M-EST. PATIENT-LVL IV: CPT | Mod: PBBFAC,HCNC,, | Performed by: INTERNAL MEDICINE

## 2023-08-03 PROCEDURE — 3075F SYST BP GE 130 - 139MM HG: CPT | Mod: HCNC,CPTII,S$GLB, | Performed by: INTERNAL MEDICINE

## 2023-08-03 RX ORDER — ATROPINE SULFATE 10 MG/ML
1 SOLUTION/ DROPS OPHTHALMIC 3 TIMES DAILY
Status: ON HOLD | COMMUNITY
Start: 2023-07-20 | End: 2024-03-16

## 2023-08-04 ENCOUNTER — HOSPITAL ENCOUNTER (OUTPATIENT)
Dept: RADIOLOGY | Facility: HOSPITAL | Age: 78
Discharge: HOME | End: 2023-08-04
Attending: INTERNAL MEDICINE
Payer: MEDICARE

## 2023-08-04 ENCOUNTER — TELEPHONE (OUTPATIENT)
Dept: PRIMARY CARE CLINIC | Facility: CLINIC | Age: 78
End: 2023-08-04
Payer: MEDICARE

## 2023-08-04 DIAGNOSIS — R06.09 DOE (DYSPNEA ON EXERTION): ICD-10-CM

## 2023-08-04 DIAGNOSIS — K59.00 CONSTIPATION, UNSPECIFIED CONSTIPATION TYPE: ICD-10-CM

## 2023-08-04 PROCEDURE — 74018 XR ABDOMEN AP 1 VIEW: ICD-10-PCS | Mod: 26,HCNC,, | Performed by: RADIOLOGY

## 2023-08-04 PROCEDURE — 74018 RADEX ABDOMEN 1 VIEW: CPT | Mod: 26,HCNC,, | Performed by: RADIOLOGY

## 2023-08-04 PROCEDURE — 71046 X-RAY EXAM CHEST 2 VIEWS: CPT | Mod: 26,HCNC,, | Performed by: RADIOLOGY

## 2023-08-04 PROCEDURE — 74018 RADEX ABDOMEN 1 VIEW: CPT | Mod: TC,HCNC,FY,PO

## 2023-08-04 PROCEDURE — 71046 X-RAY EXAM CHEST 2 VIEWS: CPT | Mod: TC,HCNC,FY,PO

## 2023-08-04 PROCEDURE — 71046 XR CHEST PA AND LATERAL: ICD-10-PCS | Mod: 26,HCNC,, | Performed by: RADIOLOGY

## 2023-08-04 NOTE — TELEPHONE ENCOUNTER
PC with pt's daughter    Lots stool on KUB, start miralax.  Stop telmisartan because she feels bad on it.  SM

## 2023-08-09 ENCOUNTER — HOSPITAL ENCOUNTER (OUTPATIENT)
Facility: HOSPITAL | Age: 78
Discharge: HOME | End: 2023-08-10
Attending: EMERGENCY MEDICINE | Admitting: INTERNAL MEDICINE
Payer: MEDICARE

## 2023-08-09 DIAGNOSIS — R79.89 ELEVATED TROPONIN: ICD-10-CM

## 2023-08-09 DIAGNOSIS — R06.02 SHORTNESS OF BREATH: ICD-10-CM

## 2023-08-09 DIAGNOSIS — E87.5 ACUTE HYPERKALEMIA: Primary | ICD-10-CM

## 2023-08-09 DIAGNOSIS — I10 CHRONIC HYPERTENSION: ICD-10-CM

## 2023-08-09 DIAGNOSIS — R07.9 CHEST PAIN: ICD-10-CM

## 2023-08-09 DIAGNOSIS — Z91.158 NONCOMPLIANCE WITH RENAL DIALYSIS: ICD-10-CM

## 2023-08-09 DIAGNOSIS — N18.6 ESRD (END STAGE RENAL DISEASE) ON DIALYSIS: ICD-10-CM

## 2023-08-09 DIAGNOSIS — E87.5 HYPERKALEMIA: ICD-10-CM

## 2023-08-09 DIAGNOSIS — Z99.2 ESRD (END STAGE RENAL DISEASE) ON DIALYSIS: ICD-10-CM

## 2023-08-09 DIAGNOSIS — N18.6 ESRD (END STAGE RENAL DISEASE): ICD-10-CM

## 2023-08-09 PROBLEM — R30.0 DYSURIA: Status: ACTIVE | Noted: 2023-08-09

## 2023-08-09 PROBLEM — R53.81 DEBILITY: Status: ACTIVE | Noted: 2023-08-09

## 2023-08-09 LAB
ALBUMIN SERPL BCP-MCNC: 3.5 G/DL (ref 3.5–5.2)
ALP SERPL-CCNC: 76 U/L (ref 55–135)
ALT SERPL W/O P-5'-P-CCNC: 10 U/L (ref 10–44)
ANION GAP SERPL CALC-SCNC: 18 MMOL/L (ref 8–16)
AST SERPL-CCNC: 13 U/L (ref 10–40)
BASOPHILS # BLD AUTO: 0.02 K/UL (ref 0–0.2)
BASOPHILS NFR BLD: 0.2 % (ref 0–1.9)
BILIRUB SERPL-MCNC: 0.3 MG/DL (ref 0.1–1)
BNP SERPL-MCNC: >4900 PG/ML (ref 0–99)
BUN SERPL-MCNC: 80 MG/DL (ref 8–23)
CALCIUM SERPL-MCNC: 10.2 MG/DL (ref 8.7–10.5)
CHLORIDE SERPL-SCNC: 103 MMOL/L (ref 95–110)
CO2 SERPL-SCNC: 18 MMOL/L (ref 23–29)
CREAT SERPL-MCNC: 11.1 MG/DL (ref 0.5–1.4)
DIFFERENTIAL METHOD: ABNORMAL
EOSINOPHIL # BLD AUTO: 0 K/UL (ref 0–0.5)
EOSINOPHIL NFR BLD: 0.1 % (ref 0–8)
ERYTHROCYTE [DISTWIDTH] IN BLOOD BY AUTOMATED COUNT: 13.9 % (ref 11.5–14.5)
EST. GFR  (NO RACE VARIABLE): 3 ML/MIN/1.73 M^2
GLUCOSE SERPL-MCNC: 178 MG/DL (ref 70–110)
HCT VFR BLD AUTO: 28.6 % (ref 37–48.5)
HGB BLD-MCNC: 9.4 G/DL (ref 12–16)
IMM GRANULOCYTES # BLD AUTO: 0.04 K/UL (ref 0–0.04)
IMM GRANULOCYTES NFR BLD AUTO: 0.4 % (ref 0–0.5)
LYMPHOCYTES # BLD AUTO: 0.6 K/UL (ref 1–4.8)
LYMPHOCYTES NFR BLD: 6.3 % (ref 18–48)
MAGNESIUM SERPL-MCNC: 3.4 MG/DL (ref 1.6–2.6)
MCH RBC QN AUTO: 33.1 PG (ref 27–31)
MCHC RBC AUTO-ENTMCNC: 32.9 G/DL (ref 32–36)
MCV RBC AUTO: 101 FL (ref 82–98)
MONOCYTES # BLD AUTO: 0.5 K/UL (ref 0.3–1)
MONOCYTES NFR BLD: 6 % (ref 4–15)
NEUTROPHILS # BLD AUTO: 7.8 K/UL (ref 1.8–7.7)
NEUTROPHILS NFR BLD: 87 % (ref 38–73)
NRBC BLD-RTO: 0 /100 WBC
PHOSPHATE SERPL-MCNC: 5.8 MG/DL (ref 2.7–4.5)
PLATELET # BLD AUTO: 196 K/UL (ref 150–450)
PMV BLD AUTO: 9.4 FL (ref 9.2–12.9)
POCT GLUCOSE: 105 MG/DL (ref 70–110)
POCT GLUCOSE: 211 MG/DL (ref 70–110)
POTASSIUM SERPL-SCNC: 6.7 MMOL/L (ref 3.5–5.1)
PROT SERPL-MCNC: 7.6 G/DL (ref 6–8.4)
RBC # BLD AUTO: 2.84 M/UL (ref 4–5.4)
SODIUM SERPL-SCNC: 139 MMOL/L (ref 136–145)
TROPONIN I SERPL DL<=0.01 NG/ML-MCNC: 0.24 NG/ML (ref 0–0.03)
TROPONIN I SERPL DL<=0.01 NG/ML-MCNC: 0.26 NG/ML (ref 0–0.03)
WBC # BLD AUTO: 9 K/UL (ref 3.9–12.7)

## 2023-08-09 PROCEDURE — 99214 OFFICE O/P EST MOD 30 MIN: CPT | Mod: HCNC,,, | Performed by: INTERNAL MEDICINE

## 2023-08-09 PROCEDURE — 99291 CRITICAL CARE FIRST HOUR: CPT | Mod: HCNC

## 2023-08-09 PROCEDURE — 94640 AIRWAY INHALATION TREATMENT: CPT | Mod: HCNC

## 2023-08-09 PROCEDURE — 84484 ASSAY OF TROPONIN QUANT: CPT | Mod: 91,HCNC | Performed by: EMERGENCY MEDICINE

## 2023-08-09 PROCEDURE — 93010 ELECTROCARDIOGRAM REPORT: CPT | Mod: HCNC,,, | Performed by: INTERNAL MEDICINE

## 2023-08-09 PROCEDURE — 25000003 PHARM REV CODE 250: Mod: HCNC | Performed by: EMERGENCY MEDICINE

## 2023-08-09 PROCEDURE — 36415 COLL VENOUS BLD VENIPUNCTURE: CPT | Mod: HCNC | Performed by: EMERGENCY MEDICINE

## 2023-08-09 PROCEDURE — 25000242 PHARM REV CODE 250 ALT 637 W/ HCPCS: Mod: HCNC | Performed by: EMERGENCY MEDICINE

## 2023-08-09 PROCEDURE — 99214 PR OFFICE/OUTPT VISIT, EST, LEVL IV, 30-39 MIN: ICD-10-PCS | Mod: HCNC,,, | Performed by: INTERNAL MEDICINE

## 2023-08-09 PROCEDURE — 93005 ELECTROCARDIOGRAM TRACING: CPT | Mod: HCNC

## 2023-08-09 PROCEDURE — 93010 EKG 12-LEAD: ICD-10-PCS | Mod: HCNC,,, | Performed by: INTERNAL MEDICINE

## 2023-08-09 PROCEDURE — 36415 COLL VENOUS BLD VENIPUNCTURE: CPT | Mod: HCNC | Performed by: INTERNAL MEDICINE

## 2023-08-09 PROCEDURE — 96375 TX/PRO/DX INJ NEW DRUG ADDON: CPT | Mod: 59,HCNC

## 2023-08-09 PROCEDURE — G0257 UNSCHED DIALYSIS ESRD PT HOS: HCPCS | Mod: HCNC

## 2023-08-09 PROCEDURE — 96365 THER/PROPH/DIAG IV INF INIT: CPT | Mod: HCNC

## 2023-08-09 PROCEDURE — 94761 N-INVAS EAR/PLS OXIMETRY MLT: CPT | Mod: HCNC

## 2023-08-09 PROCEDURE — 80074 ACUTE HEPATITIS PANEL: CPT | Mod: HCNC | Performed by: EMERGENCY MEDICINE

## 2023-08-09 PROCEDURE — G0378 HOSPITAL OBSERVATION PER HR: HCPCS | Mod: HCNC

## 2023-08-09 PROCEDURE — 85025 COMPLETE CBC W/AUTO DIFF WBC: CPT | Mod: HCNC | Performed by: EMERGENCY MEDICINE

## 2023-08-09 PROCEDURE — 80053 COMPREHEN METABOLIC PANEL: CPT | Mod: HCNC | Performed by: EMERGENCY MEDICINE

## 2023-08-09 PROCEDURE — 25000003 PHARM REV CODE 250: Mod: HCNC | Performed by: NURSE PRACTITIONER

## 2023-08-09 PROCEDURE — 87340 HEPATITIS B SURFACE AG IA: CPT | Mod: HCNC | Performed by: INTERNAL MEDICINE

## 2023-08-09 PROCEDURE — 63600175 PHARM REV CODE 636 W HCPCS: Mod: HCNC | Performed by: EMERGENCY MEDICINE

## 2023-08-09 PROCEDURE — 84100 ASSAY OF PHOSPHORUS: CPT | Mod: HCNC | Performed by: EMERGENCY MEDICINE

## 2023-08-09 PROCEDURE — 83735 ASSAY OF MAGNESIUM: CPT | Mod: HCNC | Performed by: EMERGENCY MEDICINE

## 2023-08-09 PROCEDURE — 83880 ASSAY OF NATRIURETIC PEPTIDE: CPT | Mod: HCNC | Performed by: EMERGENCY MEDICINE

## 2023-08-09 PROCEDURE — 86706 HEP B SURFACE ANTIBODY: CPT | Mod: HCNC | Performed by: INTERNAL MEDICINE

## 2023-08-09 RX ORDER — MAG HYDROX/ALUMINUM HYD/SIMETH 200-200-20
30 SUSPENSION, ORAL (FINAL DOSE FORM) ORAL 4 TIMES DAILY PRN
Status: DISCONTINUED | OUTPATIENT
Start: 2023-08-09 | End: 2023-08-10 | Stop reason: HOSPADM

## 2023-08-09 RX ORDER — ALBUTEROL SULFATE 0.83 MG/ML
10 SOLUTION RESPIRATORY (INHALATION)
Status: COMPLETED | OUTPATIENT
Start: 2023-08-09 | End: 2023-08-09

## 2023-08-09 RX ORDER — ACETAMINOPHEN 325 MG/1
650 TABLET ORAL EVERY 4 HOURS PRN
Status: DISCONTINUED | OUTPATIENT
Start: 2023-08-09 | End: 2023-08-10 | Stop reason: HOSPADM

## 2023-08-09 RX ORDER — METOPROLOL TARTRATE 50 MG/1
50 TABLET ORAL 2 TIMES DAILY
Status: DISCONTINUED | OUTPATIENT
Start: 2023-08-09 | End: 2023-08-10 | Stop reason: HOSPADM

## 2023-08-09 RX ORDER — ASPIRIN 81 MG/1
81 TABLET ORAL DAILY
Status: DISCONTINUED | OUTPATIENT
Start: 2023-08-10 | End: 2023-08-10 | Stop reason: HOSPADM

## 2023-08-09 RX ORDER — IBUPROFEN 200 MG
16 TABLET ORAL
Status: DISCONTINUED | OUTPATIENT
Start: 2023-08-09 | End: 2023-08-10 | Stop reason: HOSPADM

## 2023-08-09 RX ORDER — SODIUM CHLORIDE 0.9 % (FLUSH) 0.9 %
10 SYRINGE (ML) INJECTION EVERY 12 HOURS PRN
Status: DISCONTINUED | OUTPATIENT
Start: 2023-08-09 | End: 2023-08-10 | Stop reason: HOSPADM

## 2023-08-09 RX ORDER — ENOXAPARIN SODIUM 100 MG/ML
30 INJECTION SUBCUTANEOUS EVERY 24 HOURS
Status: DISCONTINUED | OUTPATIENT
Start: 2023-08-09 | End: 2023-08-10

## 2023-08-09 RX ORDER — NALOXONE HCL 0.4 MG/ML
0.02 VIAL (ML) INJECTION
Status: DISCONTINUED | OUTPATIENT
Start: 2023-08-09 | End: 2023-08-10 | Stop reason: HOSPADM

## 2023-08-09 RX ORDER — SEVELAMER CARBONATE 800 MG/1
2400 TABLET, FILM COATED ORAL
Status: DISCONTINUED | OUTPATIENT
Start: 2023-08-10 | End: 2023-08-10 | Stop reason: HOSPADM

## 2023-08-09 RX ORDER — ONDANSETRON 4 MG/1
4 TABLET, ORALLY DISINTEGRATING ORAL EVERY 8 HOURS PRN
Status: DISCONTINUED | OUTPATIENT
Start: 2023-08-09 | End: 2023-08-10 | Stop reason: HOSPADM

## 2023-08-09 RX ORDER — INSULIN ASPART 100 [IU]/ML
1-10 INJECTION, SOLUTION INTRAVENOUS; SUBCUTANEOUS
Status: DISCONTINUED | OUTPATIENT
Start: 2023-08-09 | End: 2023-08-10 | Stop reason: HOSPADM

## 2023-08-09 RX ORDER — IBUPROFEN 200 MG
24 TABLET ORAL
Status: DISCONTINUED | OUTPATIENT
Start: 2023-08-09 | End: 2023-08-10 | Stop reason: HOSPADM

## 2023-08-09 RX ORDER — POLYETHYLENE GLYCOL 3350 17 G/17G
17 POWDER, FOR SOLUTION ORAL DAILY
Status: DISCONTINUED | OUTPATIENT
Start: 2023-08-10 | End: 2023-08-10 | Stop reason: HOSPADM

## 2023-08-09 RX ORDER — TALC
6 POWDER (GRAM) TOPICAL NIGHTLY PRN
Status: DISCONTINUED | OUTPATIENT
Start: 2023-08-09 | End: 2023-08-10 | Stop reason: HOSPADM

## 2023-08-09 RX ORDER — GLUCAGON 1 MG
1 KIT INJECTION
Status: DISCONTINUED | OUTPATIENT
Start: 2023-08-09 | End: 2023-08-10 | Stop reason: HOSPADM

## 2023-08-09 RX ORDER — CALCIUM GLUCONATE 20 MG/ML
1 INJECTION, SOLUTION INTRAVENOUS EVERY 10 MIN PRN
Status: DISCONTINUED | OUTPATIENT
Start: 2023-08-09 | End: 2023-08-10 | Stop reason: HOSPADM

## 2023-08-09 RX ORDER — SODIUM CHLORIDE 9 MG/ML
INJECTION, SOLUTION INTRAVENOUS ONCE
Status: CANCELLED | OUTPATIENT
Start: 2023-08-09 | End: 2023-08-09

## 2023-08-09 RX ORDER — FOLIC ACID 1 MG/1
1 TABLET ORAL DAILY
Status: DISCONTINUED | OUTPATIENT
Start: 2023-08-10 | End: 2023-08-10 | Stop reason: HOSPADM

## 2023-08-09 RX ORDER — CALCIUM GLUCONATE 20 MG/ML
1 INJECTION, SOLUTION INTRAVENOUS
Status: COMPLETED | OUTPATIENT
Start: 2023-08-09 | End: 2023-08-09

## 2023-08-09 RX ADMIN — ALBUTEROL SULFATE 10 MG: 2.5 SOLUTION RESPIRATORY (INHALATION) at 01:08

## 2023-08-09 RX ADMIN — METOPROLOL TARTRATE 50 MG: 50 TABLET, FILM COATED ORAL at 09:08

## 2023-08-09 RX ADMIN — INSULIN HUMAN 6.54 UNITS: 100 INJECTION, SOLUTION PARENTERAL at 01:08

## 2023-08-09 RX ADMIN — CALCIUM GLUCONATE 1 G: 20 INJECTION, SOLUTION INTRAVENOUS at 01:08

## 2023-08-09 RX ADMIN — SODIUM ZIRCONIUM CYCLOSILICATE 10 G: 5 POWDER, FOR SUSPENSION ORAL at 01:08

## 2023-08-09 RX ADMIN — DEXTROSE MONOHYDRATE 250 ML: 100 INJECTION, SOLUTION INTRAVENOUS at 01:08

## 2023-08-09 NOTE — H&P
[facility] - Emergency Dept.  [facility]  History & Physical    Patient Name: Avril Monzon  MRN: [id number]  Patient Class: OP- Observation  Admission Date: 8/9/2023  Attending Physician: Vera Mcgill MD   Primary Care Provider: Rose Germain MD         Patient information was obtained from patient, past medical records and ER records.     Subjective:     Principal Problem:Hyperkalemia    Chief Complaint:   Chief Complaint   Patient presents with    Shortness of Breath     Patient presents with SOB since Friday, ESRD on HD MWF, patient missed dialysis today and Monday.        HPI: 78 y.o. female patient with a PMHx of acute hypoxemic respiratory failure, heart failure, ESRD on dialysis M/W/F, CAD s/p CABG, DM2, HLD, HTN, seizures, and CVA who presents to the Emergency Department for SOB which onset 5 days ago. Pt reports that she missed her last 2 dialysis appointments, on 8/4/23 and 8/7/23, because she was short of breath. Pt reports that she dialyzes at [facility] and is unsure of who her Nephrologist is. Symptoms are constant and moderate in severity. No mitigating or exacerbating factors reported. No associated sxs reported. Patient denies any CP, fever, chills, N/V/D, weakness, and all other sxs at this time. No prior Tx reported.Of note, patient presented to PCP on 8/3/23 with c/o weakness, foul odor to urine. UA ordered, was not collected. Reports continued foul odor. In the ED, Labs: Hgb: 9.4, K+: 6.7, BUN: 80, Cr: 11.1, BNP: >4900, trop: 0.258, CXR: Cardiomegaly with developing bilateral pleural effusions and suspected pulmonary venous hypertension. Nephrology consulted, HD performed in ED. Continued weakness/fatigue. Patient is a full code, surrogate decision maker is her daughter, Elizabeth Monzon. Placed in Observation under the care of [facility] for management of Hyperkalemia, Fatigue.        Past Medical History:   Diagnosis Date    Acute hypoxemic respiratory failure 11/26/2018     Anemia     Arthritis     back, hand, knees    Back pain     Cataract     CKD stage G4/A3, GFR 15 - 29 and albumin creatinine ratio >300 mg/g     GFR 40% Jan 2014 and 33% ub 3/2014 (BRG)    Coronary artery disease involving native coronary artery of native heart 11/30/2018    Diabetic retinopathy     DM (diabetes mellitus) type II controlled, neurological manifestation     Exudative age-related macular degeneration of left eye with active choroidal neovascularization 2/5/2019    GERD (gastroesophageal reflux disease)     Glaucoma     Heart failure     Hyperlipidemia     Hypertension     Non-ST elevation MI (NSTEMI) 11/28/2018    NSTEMI (non-ST elevated myocardial infarction) 11/27/2018    Peripheral neuropathy     Polyneuropathy     Proteinuria     >6 mo     Seizures     BRG 1/2014 -dick CT NRI showed small vessel    Skin ulcer of toe of right foot with fat layer exposed 10/14/2022    Stroke 2012,2014    Tobacco dependence     Type 2 diabetes with peripheral circulatory disorder, controlled        Past Surgical History:   Procedure Laterality Date    BREAST LUMPECTOMY Left     benign, when patient was 15 years old    BREAST MASS EXCISION      ,benign, age 15.    CATHETERIZATION OF BOTH LEFT AND RIGHT HEART N/A 11/28/2018    Procedure: CATHETERIZATION, HEART, BOTH LEFT AND RIGHT;  Surgeon: Michelle Holman MD;  Location: [facility] CATH LAB;  Service: Cardiology;  Laterality: N/A;    CATHETERIZATION OF BOTH LEFT AND RIGHT HEART N/A 11/30/2018    Procedure: CATHETERIZATION, HEART, BOTH LEFT AND RIGHT;  Surgeon: Michelle Holman MD;  Location: [facility] CATH LAB;  Service: Cardiology;  Laterality: N/A;    CORONARY ARTERY BYPASS GRAFT      CORONARY ARTERY BYPASS GRAFT (CABG) N/A 7/20/2022    Procedure: CORONARY ARTERY BYPASS GRAFT (CABG);  Surgeon: Sanju Locke MD;  Location: [facility] OR;  Service: Cardiothoracic;  Laterality: N/A;  2-VESSEL PUMP ASSIST WITH EPI-AORTIC ULTRASOUND    ENDOSCOPIC HARVEST OF  VEIN Left 7/20/2022    Procedure: SURGICAL PROCUREMENT, VEIN, ENDOSCOPIC;  Surgeon: Sanju Locke MD;  Location: [facility] OR;  Service: Cardiothoracic;  Laterality: Left;    HYSTERECTOMY  1982    INJECTION OF ANESTHETIC AGENT AROUND MULTIPLE INTERCOSTAL NERVES N/A 7/20/2022    Procedure: BLOCK, NERVE, INTERCOSTAL, 2 OR MORE;  Surgeon: Sanju Locke MD;  Location: [facility] OR;  Service: Cardiothoracic;  Laterality: N/A;  PARASTERNAL NERVE BLOCK    INSERTION OF SHUNT      LEFT HEART CATHETERIZATION Left 12/3/2018    Procedure: CATHETERIZATION, HEART, LEFT;  Surgeon: Michelle oHlman MD;  Location: [facility] CATH LAB;  Service: Cardiology;  Laterality: Left;  LAD ATHERECTOMY STENT/ FEMORAL APPROACH    LEFT HEART CATHETERIZATION Left 7/13/2022    Procedure: CATHETERIZATION, HEART, LEFT;  Surgeon: Abhi Sloan MD;  Location: [facility] CATH LAB;  Service: Cardiology;  Laterality: Left;    OOPHORECTOMY      wrist cyst Right 1980s    dorsal wrist cyst       Review of patient's allergies indicates:   Allergen Reactions    Codeine Swelling    Darvon [propoxyphene] Swelling    Atorvastatin      Muscle twitching       No current facility-administered medications on file prior to encounter.     Current Outpatient Medications on File Prior to Encounter   Medication Sig    aspirin (ECOTRIN) 81 MG EC tablet Take 1 tablet (81 mg total) by mouth once daily.    atropine 1% (ISOPTO ATROPINE) 1 % Drop Place 1 drop into the left eye 3 (three) times daily.    blood-glucose meter (TRUE METRIX GLUCOSE METER) Misc Use as instructed    cyanocobalamin (VITAMIN B-12) 1000 MCG tablet Take 100 mcg by mouth once daily.    folic acid (FOLVITE) 1 MG tablet Take 1 mg by mouth once daily.    insulin aspart protamine-insulin aspart (NOVOLOG MIX 70-30FLEXPEN U-100) 100 unit/mL (70-30) InPn pen Inject 20 units into skin before breakfast, 8 units before dinner. On dialysis days, give 10 units before dinner.    lancets (TRUEPLUS LANCETS) 33  "gauge Misc 1 lancet by Misc.(Non-Drug; Combo Route) route 3 (three) times daily.    metoprolol tartrate (LOPRESSOR) 50 MG tablet Take 1 tablet (50 mg total) by mouth 2 (two) times daily.    pen needle, diabetic 32 gauge x 5/32" Ndle 1 each by Misc.(Non-Drug; Combo Route) route 2 (two) times a day.    rosuvastatin (CRESTOR) 20 MG tablet Take 1 tablet (20 mg total) by mouth once daily.    sevelamer carbonate (RENVELA) 800 mg Tab Take 3 tablets (2,400 mg) by mouth three times a day with meals and 2 tablets (1,600 mg) by mouth with snacks    ammonium lactate 12 % Crea Apply 1 application topically 2 (two) times daily. For calluses    azelastine (ASTELIN) 137 mcg (0.1 %) nasal spray 1 spray (137 mcg total) by Nasal route 2 (two) times daily.    blood sugar diagnostic (TRUE METRIX GLUCOSE TEST STRIP) Strp 1 strip by Misc.(Non-Drug; Combo Route) route 3 (three) times daily.    fluocinonide (LIDEX) 0.05 % external solution Apply topically 2 (two) times daily. Use on scalp    loratadine (CLARITIN) 10 mg tablet Take 1 tablet (10 mg total) by mouth once daily.    mupirocin (BACTROBAN) 2 % ointment Apply topically 2 (two) times daily.    ondansetron (ZOFRAN-ODT) 4 MG TbDL Take 1 tablet (4 mg total) by mouth every 8 (eight) hours as needed (nausea/vomiting).    [DISCONTINUED] isosorbide-hydrALAZINE 20-37.5 mg (BIDIL) 20-37.5 mg Tab Take 1 tablet by mouth 3 (three) times daily.     Family History       Problem Relation (Age of Onset)    Cancer Maternal Grandmother    Diabetes Mother    Heart disease Mother    Hyperlipidemia Mother    Hypertension Mother    Muscular dystrophy Son          Tobacco Use    Smoking status: Former     Current packs/day: 0.00     Average packs/day: 1.5 packs/day for 30.0 years (45.0 ttl pk-yrs)     Types: Cigarettes     Start date: 1960     Quit date: 1990     Years since quittin.6    Smokeless tobacco: Former   Substance and Sexual Activity    Alcohol use: No     " Alcohol/week: 0.0 standard drinks of alcohol    Drug use: No    Sexual activity: Not Currently     Review of Systems   Constitutional:  Positive for fatigue.   Respiratory:  Positive for chest tightness and shortness of breath.    Genitourinary:  Positive for dysuria.   All other systems reviewed and are negative.    Objective:     Vital Signs (Most Recent):  Temp: 98.3 °F (36.8 °C) (08/09/23 1200)  Pulse: 69 (08/09/23 1338)  Resp: 18 (08/09/23 1338)  BP: (!) 172/64 (08/09/23 1200)  SpO2: (!) 94 % (08/09/23 1200) Vital Signs (24h Range):  Temp:  [98.3 °F (36.8 °C)] 98.3 °F (36.8 °C)  Pulse:  [69-72] 69  Resp:  [18] 18  SpO2:  [94 %] 94 %  BP: (172)/(64) 172/64     Weight: 65.4 kg (144 lb 2.9 oz)  Body mass index is 24.75 kg/m².     Physical Exam  Vitals and nursing note reviewed.   Constitutional:       General: She is not in acute distress.     Appearance: She is well-developed. She is ill-appearing.   HENT:      Head: Normocephalic and atraumatic.      Right Ear: Hearing and external ear normal.      Left Ear: Hearing and external ear normal.      Nose: No rhinorrhea.      Right Sinus: No maxillary sinus tenderness or frontal sinus tenderness.      Left Sinus: No maxillary sinus tenderness or frontal sinus tenderness.      Mouth/Throat:      Mouth: No oral lesions.      Pharynx: Uvula midline.   Eyes:      General:         Right eye: No discharge.         Left eye: No discharge.      Conjunctiva/sclera: Conjunctivae normal.      Pupils: Pupils are equal, round, and reactive to light.   Cardiovascular:      Rate and Rhythm: Normal rate and regular rhythm.      Pulses:           Dorsalis pedis pulses are 2+ on the right side and 2+ on the left side.      Heart sounds: Normal heart sounds, S1 normal and S2 normal. No murmur heard.  Pulmonary:      Effort: Pulmonary effort is normal. No respiratory distress.      Breath sounds: Normal breath sounds.   Abdominal:      General: Bowel sounds are normal. There is no  distension.      Palpations: Abdomen is soft. There is no mass.      Tenderness: There is no abdominal tenderness.   Musculoskeletal:         General: Normal range of motion.      Cervical back: Normal range of motion.   Lymphadenopathy:      Upper Body:      Right upper body: No supraclavicular adenopathy.      Left upper body: No supraclavicular adenopathy.   Skin:     General: Skin is warm and dry.      Capillary Refill: Capillary refill takes less than 2 seconds.      Findings: No rash.   Neurological:      Mental Status: She is alert and oriented to person, place, and time.      Sensory: No sensory deficit.      Coordination: Coordination normal.      Gait: Gait normal.   Psychiatric:         Mood and Affect: Mood is not anxious or depressed.         Speech: Speech normal.         Behavior: Behavior normal.         Thought Content: Thought content normal.         Judgment: Judgment normal.              CRANIAL NERVES     CN III, IV, VI   Pupils are equal, round, and reactive to light.       Significant Labs: All pertinent labs within the past 24 hours have been reviewed.  CBC:   Recent Labs   Lab 08/09/23  1227   WBC 9.00   HGB 9.4*   HCT 28.6*        CMP:   Recent Labs   Lab 08/09/23  1227      K 6.7*      CO2 18*   *   BUN 80*   CREATININE 11.1*   CALCIUM 10.2   PROT 7.6   ALBUMIN 3.5   BILITOT 0.3   ALKPHOS 76   AST 13   ALT 10   ANIONGAP 18*       Significant Imaging: I have reviewed all pertinent imaging results/findings within the past 24 hours.    Assessment/Plan:     * Hyperkalemia  Secondary to non-compliance with HD, last HD session was 8/4/23, scheduled for M,W,F    --HD completed in ED  --Repeat Labs in AM      Debility  Reported worsening fatigue, weakness over the past 1-2 weeks.     --PT/OT      Dysuria    Presented to PCP on 8/3/23 with c/o foul smelling urine, patient unable to provide urine sample. Reports continued foul odor, denies fever. SInce patient had HD in ED,  may not be able to produce urine sample until the AM    --UA reflex to culture    Metabolic acidosis  Secondary to non-compliance with HD, A, CO2: 18, expect with improve post HD    --HD completed in ED  --Repeat CMP in AM      Chronic combined systolic and diastolic heart failure  Euvolemic on exam, Lungs clear, Per review of CXR: Cardiomegaly with developing bilateral pleural effusions and suspected pulmonary venous hypertension. CXR prior to HD in ED.     --Continue home medications      Anemia in chronic kidney disease, on chronic dialysis  Chronic, stable, baseline Hgb: 9.0    --Daily CBC  --Transfuse with 1 unit PRBC for Hgb <7.0 or <8.0 if symptomatic       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, with long-term current use of insulin  Patient's FSGs are controlled on current medication regimen.  Last A1c reviewed-   Lab Results   Component Value Date    HGBA1C 6.6 (H) 2023     Most recent fingerstick glucose reviewed-   Recent Labs   Lab 23  1429   POCTGLUCOSE 211*     Current correctional scale  Medium  Maintain anti-hyperglycemic dose as follows-   Antihyperglycemics (From admission, onward)    Start     Stop Route Frequency Ordered    23 1834  insulin aspart U-100 pen 1-10 Units         -- SubQ Before meals & nightly PRN 23 1735        Hold Oral hypoglycemics while patient is in the hospital.    ESRD (end stage renal disease)  HD on M,W,F, missed Monday and Wednesday    --HD in ED per Nephrology, removed 2L  --Repeat labs in AM        VTE Risk Mitigation (From admission, onward)         Ordered     enoxaparin injection 30 mg  Daily         23 1735     IP VTE HIGH RISK PATIENT  Once         23 1735     Place sequential compression device  Until discontinued         23 173                     On 2023, patient should be placed in hospital observation services under my care in collaboration with Vera Mcgill  MD.      Natali Finney NP  Department of Hospital Medicine  [facility] - Emergency Dept.

## 2023-08-09 NOTE — ASSESSMENT & PLAN NOTE
Secondary to non-compliance with HD, A, CO2: 18, expect with improve post HD    --HD completed in ED  --Repeat CMP in AM

## 2023-08-09 NOTE — ASSESSMENT & PLAN NOTE
Euvolemic on exam, Lungs clear, Per review of CXR: Cardiomegaly with developing bilateral pleural effusions and suspected pulmonary venous hypertension. CXR prior to HD in ED.     --Continue home medications

## 2023-08-09 NOTE — ASSESSMENT & PLAN NOTE
HD on M,W,F, missed Monday and Wednesday    --HD in ED per Nephrology, removed 2L  --Repeat labs in AM

## 2023-08-09 NOTE — CONSULTS
[facility] - Emergency Dept.  Nephrology  Consult Note    Patient Name: Avril Monzon  MRN: [id number]  Admission Date: 8/9/2023  Hospital Length of Stay: 0 days  Attending Provider: Lizzette Holder MD   Primary Care Physician: Rose Germain MD  Principal Problem:<principal problem not specified>    Inpatient consult to Nephrology  Consult performed by: Adrian Ramirez MD  Consult ordered by: Lizzette Holder MD  Reason for consult: End-stage renal disease        Subjective:     HPI:  78-year-old female well known to me from previous admissions.  Presented to the emergency department with weakness for the past 4 to 5 days.  She is not been to dialysis this week.  Her last dialysis treatment was Friday of last week.  She usually dialyzes on a Monday Wednesday Friday schedule.  Nephrology has been consulted for maintenance of dialysis and assistance with management of end-stage renal disease.  Other than generalized weakness she has no other complaints.  Patient was seen in the emergency department.  In bed resting comfortably.  No acute distress noted.    Past Medical History:   Diagnosis Date    Acute hypoxemic respiratory failure 11/26/2018    Anemia     Arthritis     back, hand, knees    Back pain     Cataract     CKD stage G4/A3, GFR 15 - 29 and albumin creatinine ratio >300 mg/g     GFR 40% Jan 2014 and 33% ub 3/2014 (BRG)    Coronary artery disease involving native coronary artery of native heart 11/30/2018    Diabetic retinopathy     DM (diabetes mellitus) type II controlled, neurological manifestation     Exudative age-related macular degeneration of left eye with active choroidal neovascularization 2/5/2019    GERD (gastroesophageal reflux disease)     Glaucoma     Heart failure     Hyperlipidemia     Hypertension     Non-ST elevation MI (NSTEMI) 11/28/2018    NSTEMI (non-ST elevated myocardial infarction) 11/27/2018    Peripheral neuropathy     Polyneuropathy     Proteinuria     >6 mo     Seizures      BRG 1/2014 -dick CT NRI showed small vessel    Skin ulcer of toe of right foot with fat layer exposed 10/14/2022    Stroke 2012,2014    Tobacco dependence     Type 2 diabetes with peripheral circulatory disorder, controlled        Past Surgical History:   Procedure Laterality Date    BREAST LUMPECTOMY Left     benign, when patient was 15 years old    BREAST MASS EXCISION      ,benign, age 15.    CATHETERIZATION OF BOTH LEFT AND RIGHT HEART N/A 11/28/2018    Procedure: CATHETERIZATION, HEART, BOTH LEFT AND RIGHT;  Surgeon: Michelle Holman MD;  Location: [facility] CATH LAB;  Service: Cardiology;  Laterality: N/A;    CATHETERIZATION OF BOTH LEFT AND RIGHT HEART N/A 11/30/2018    Procedure: CATHETERIZATION, HEART, BOTH LEFT AND RIGHT;  Surgeon: Michelle Holman MD;  Location: [facility] CATH LAB;  Service: Cardiology;  Laterality: N/A;    CORONARY ARTERY BYPASS GRAFT      CORONARY ARTERY BYPASS GRAFT (CABG) N/A 7/20/2022    Procedure: CORONARY ARTERY BYPASS GRAFT (CABG);  Surgeon: Sanju Locke MD;  Location: [facility] OR;  Service: Cardiothoracic;  Laterality: N/A;  2-VESSEL PUMP ASSIST WITH EPI-AORTIC ULTRASOUND    ENDOSCOPIC HARVEST OF VEIN Left 7/20/2022    Procedure: SURGICAL PROCUREMENT, VEIN, ENDOSCOPIC;  Surgeon: Sanju Locke MD;  Location: [facility] OR;  Service: Cardiothoracic;  Laterality: Left;    HYSTERECTOMY  1982    INJECTION OF ANESTHETIC AGENT AROUND MULTIPLE INTERCOSTAL NERVES N/A 7/20/2022    Procedure: BLOCK, NERVE, INTERCOSTAL, 2 OR MORE;  Surgeon: Sanju Locke MD;  Location: [facility] OR;  Service: Cardiothoracic;  Laterality: N/A;  PARASTERNAL NERVE BLOCK    INSERTION OF SHUNT      LEFT HEART CATHETERIZATION Left 12/3/2018    Procedure: CATHETERIZATION, HEART, LEFT;  Surgeon: Michelle Holman MD;  Location: [facility] CATH LAB;  Service: Cardiology;  Laterality: Left;  LAD ATHERECTOMY STENT/ FEMORAL APPROACH    LEFT HEART CATHETERIZATION Left 7/13/2022    Procedure: CATHETERIZATION, HEART, LEFT;  Surgeon: Abhi  "KEVIN Sloan MD;  Location: [facility] CATH LAB;  Service: Cardiology;  Laterality: Left;    OOPHORECTOMY      wrist cyst Right 1980s    dorsal wrist cyst       Review of patient's allergies indicates:   Allergen Reactions    Codeine Swelling    Darvon [propoxyphene] Swelling    Atorvastatin      Muscle twitching     Current Facility-Administered Medications   Medication Frequency    calcium gluconate 1 g in NS IVPB (premixed) Q10 Min PRN    dextrose 10% bolus 250 mL 250 mL PRN     Current Outpatient Medications   Medication    ammonium lactate 12 % Crea    aspirin (ECOTRIN) 81 MG EC tablet    atropine 1% (ISOPTO ATROPINE) 1 % Drop    azelastine (ASTELIN) 137 mcg (0.1 %) nasal spray    blood sugar diagnostic (TRUE METRIX GLUCOSE TEST STRIP) Strp    blood-glucose meter (TRUE METRIX GLUCOSE METER) Misc    cyanocobalamin (VITAMIN B-12) 1000 MCG tablet    fluocinonide (LIDEX) 0.05 % external solution    folic acid (FOLVITE) 1 MG tablet    insulin aspart protamine-insulin aspart (NOVOLOG MIX 70-30FLEXPEN U-100) 100 unit/mL (70-30) InPn pen    lancets (TRUEPLUS LANCETS) 33 gauge Misc    loratadine (CLARITIN) 10 mg tablet    metoprolol tartrate (LOPRESSOR) 50 MG tablet    mupirocin (BACTROBAN) 2 % ointment    ondansetron (ZOFRAN-ODT) 4 MG TbDL    pen needle, diabetic 32 gauge x /32" Ndle    rosuvastatin (CRESTOR) 20 MG tablet    sevelamer carbonate (RENVELA) 800 mg Tab     Family History       Problem Relation (Age of Onset)    Cancer Maternal Grandmother    Diabetes Mother    Heart disease Mother    Hyperlipidemia Mother    Hypertension Mother    Muscular dystrophy Son          Tobacco Use    Smoking status: Former     Current packs/day: 0.00     Average packs/day: 1.5 packs/day for 30.0 years (45.0 ttl pk-yrs)     Types: Cigarettes     Start date: 1960     Quit date: 1990     Years since quittin.6    Smokeless tobacco: Former   Substance and Sexual Activity    Alcohol use: No     Alcohol/week: 0.0 standard drinks " of alcohol    Drug use: No    Sexual activity: Not Currently     Review of Systems   Constitutional:  Positive for fatigue.   HENT: Negative.     Respiratory: Negative.     Cardiovascular: Negative.    Gastrointestinal:  Positive for constipation.   Genitourinary: Negative.    Musculoskeletal: Negative.    Skin: Negative.    Neurological:  Positive for weakness.   Hematological: Negative.      Objective:     Vital Signs (Most Recent):  Temp: 98.3 °F (36.8 °C) (08/09/23 1200)  Pulse: 69 (08/09/23 1338)  Resp: 18 (08/09/23 1338)  BP: (!) 172/64 (08/09/23 1200)  SpO2: (!) 94 % (08/09/23 1200) Vital Signs (24h Range):  Temp:  [98.3 °F (36.8 °C)] 98.3 °F (36.8 °C)  Pulse:  [69-72] 69  Resp:  [18] 18  SpO2:  [94 %] 94 %  BP: (172)/(64) 172/64     Weight: 65.4 kg (144 lb 2.9 oz) (08/09/23 1308)  Body mass index is 24.75 kg/m².  Body surface area is 1.72 meters squared.    No intake/output data recorded.    Physical Exam  Constitutional:       Appearance: Normal appearance.   HENT:      Head: Normocephalic and atraumatic.   Eyes:      General: No scleral icterus.     Extraocular Movements: Extraocular movements intact.      Pupils: Pupils are equal, round, and reactive to light.   Cardiovascular:      Rate and Rhythm: Normal rate and regular rhythm.   Pulmonary:      Effort: Pulmonary effort is normal.      Breath sounds: No stridor.   Musculoskeletal:      Right lower leg: No edema.      Left lower leg: No edema.   Skin:     General: Skin is warm and dry.   Neurological:      General: No focal deficit present.      Mental Status: She is alert and oriented to person, place, and time.         Significant Labs:  BMP:   Recent Labs   Lab 08/09/23  1227   *      K 6.7*      CO2 18*   BUN 80*   CREATININE 11.1*   CALCIUM 10.2   MG 3.4*     CBC:   Recent Labs   Lab 08/09/23  1227   WBC 9.00   RBC 2.84*   HGB 9.4*   HCT 28.6*      *   MCH 33.1*   MCHC 32.9     All labs within the past 24 hours have  been reviewed.    Significant Imaging:  Labs: Reviewed      Assessment/Plan:     There are no hospital problems to display for this patient.      1. Hyperkalemia: Secondary to noncompliance with dialysis.  Potassium was 6.7 in the emergency department.  She was dialyzed urgently for treatment.  Potassium should improve.    2. End-stage renal disease:  She was dialyzed urgently in the emergency department.  She tolerated the procedure well.  Approximately 2 L ultrafiltration.      3. Acid-base: Serum bicarbonate is 18.  Secondary to noncompliance with dialysis.  Should improve post treatment.      4. Volume status: No evidence of volume overload.  As per above tolerated 2 L ultrafiltration without difficulty.    Thank you for your consult.     Adrian Ramirez MD  Nephrology  [facility] - Emergency Dept.

## 2023-08-09 NOTE — ASSESSMENT & PLAN NOTE
Chronic, stable, baseline Hgb: 9.0    --Daily CBC  --Transfuse with 1 unit PRBC for Hgb <7.0 or <8.0 if symptomatic

## 2023-08-09 NOTE — SUBJECTIVE & OBJECTIVE
Past Medical History:   Diagnosis Date    Acute hypoxemic respiratory failure 11/26/2018    Anemia     Arthritis     back, hand, knees    Back pain     Cataract     CKD stage G4/A3, GFR 15 - 29 and albumin creatinine ratio >300 mg/g     GFR 40% Jan 2014 and 33% ub 3/2014 ([facility])    Coronary artery disease involving native coronary artery of native heart 11/30/2018    Diabetic retinopathy     DM (diabetes mellitus) type II controlled, neurological manifestation     Exudative age-related macular degeneration of left eye with active choroidal neovascularization 2/5/2019    GERD (gastroesophageal reflux disease)     Glaucoma     Heart failure     Hyperlipidemia     Hypertension     Non-ST elevation MI (NSTEMI) 11/28/2018    NSTEMI (non-ST elevated myocardial infarction) 11/27/2018    Peripheral neuropathy     Polyneuropathy     Proteinuria     >6 mo     Seizures     [facility] 1/2014 -dick CT NRI showed small vessel    Skin ulcer of toe of right foot with fat layer exposed 10/14/2022    Stroke 2012,2014    Tobacco dependence     Type 2 diabetes with peripheral circulatory disorder, controlled        Past Surgical History:   Procedure Laterality Date    BREAST LUMPECTOMY Left     benign, when patient was 15 years old    BREAST MASS EXCISION      ,benign, age 15.    CATHETERIZATION OF BOTH LEFT AND RIGHT HEART N/A 11/28/2018    Procedure: CATHETERIZATION, HEART, BOTH LEFT AND RIGHT;  Surgeon: Michelle Holman MD;  Location: [facility] CATH LAB;  Service: Cardiology;  Laterality: N/A;    CATHETERIZATION OF BOTH LEFT AND RIGHT HEART N/A 11/30/2018    Procedure: CATHETERIZATION, HEART, BOTH LEFT AND RIGHT;  Surgeon: Michelle Holman MD;  Location: [facility] CATH LAB;  Service: Cardiology;  Laterality: N/A;    CORONARY ARTERY BYPASS GRAFT      CORONARY ARTERY BYPASS GRAFT (CABG) N/A 7/20/2022    Procedure: CORONARY ARTERY BYPASS GRAFT (CABG);  Surgeon: Sanju Locke MD;  Location: [facility] OR;  Service: Cardiothoracic;  Laterality: N/A;  2-VESSEL  PUMP ASSIST WITH EPI-AORTIC ULTRASOUND    ENDOSCOPIC HARVEST OF VEIN Left 7/20/2022    Procedure: SURGICAL PROCUREMENT, VEIN, ENDOSCOPIC;  Surgeon: Sanju oLcke MD;  Location: [facility] OR;  Service: Cardiothoracic;  Laterality: Left;    HYSTERECTOMY  1982    INJECTION OF ANESTHETIC AGENT AROUND MULTIPLE INTERCOSTAL NERVES N/A 7/20/2022    Procedure: BLOCK, NERVE, INTERCOSTAL, 2 OR MORE;  Surgeon: Sanju Locke MD;  Location: [facility] OR;  Service: Cardiothoracic;  Laterality: N/A;  PARASTERNAL NERVE BLOCK    INSERTION OF SHUNT      LEFT HEART CATHETERIZATION Left 12/3/2018    Procedure: CATHETERIZATION, HEART, LEFT;  Surgeon: Michelle Holman MD;  Location: [facility] CATH LAB;  Service: Cardiology;  Laterality: Left;  LAD ATHERECTOMY STENT/ FEMORAL APPROACH    LEFT HEART CATHETERIZATION Left 7/13/2022    Procedure: CATHETERIZATION, HEART, LEFT;  Surgeon: Abhi Sloan MD;  Location: [facility] CATH LAB;  Service: Cardiology;  Laterality: Left;    OOPHORECTOMY      wrist cyst Right 1980s    dorsal wrist cyst       Review of patient's allergies indicates:   Allergen Reactions    Codeine Swelling    Darvon [propoxyphene] Swelling    Atorvastatin      Muscle twitching       No current facility-administered medications on file prior to encounter.     Current Outpatient Medications on File Prior to Encounter   Medication Sig    aspirin (ECOTRIN) 81 MG EC tablet Take 1 tablet (81 mg total) by mouth once daily.    atropine 1% (ISOPTO ATROPINE) 1 % Drop Place 1 drop into the left eye 3 (three) times daily.    blood-glucose meter (TRUE METRIX GLUCOSE METER) Misc Use as instructed    cyanocobalamin (VITAMIN B-12) 1000 MCG tablet Take 100 mcg by mouth once daily.    folic acid (FOLVITE) 1 MG tablet Take 1 mg by mouth once daily.    insulin aspart protamine-insulin aspart (NOVOLOG MIX 70-30FLEXPEN U-100) 100 unit/mL (70-30) InPn pen Inject 20 units into skin before breakfast, 8 units before dinner. On dialysis days, give 10 units  "before dinner.    lancets (TRUEPLUS LANCETS) 33 gauge Misc 1 lancet by Misc.(Non-Drug; Combo Route) route 3 (three) times daily.    metoprolol tartrate (LOPRESSOR) 50 MG tablet Take 1 tablet (50 mg total) by mouth 2 (two) times daily.    pen needle, diabetic 32 gauge x 5/32" Ndle 1 each by Misc.(Non-Drug; Combo Route) route 2 (two) times a day.    rosuvastatin (CRESTOR) 20 MG tablet Take 1 tablet (20 mg total) by mouth once daily.    sevelamer carbonate (RENVELA) 800 mg Tab Take 3 tablets (2,400 mg) by mouth three times a day with meals and 2 tablets (1,600 mg) by mouth with snacks    ammonium lactate 12 % Crea Apply 1 application topically 2 (two) times daily. For calluses    azelastine (ASTELIN) 137 mcg (0.1 %) nasal spray 1 spray (137 mcg total) by Nasal route 2 (two) times daily.    blood sugar diagnostic (TRUE METRIX GLUCOSE TEST STRIP) Strp 1 strip by Misc.(Non-Drug; Combo Route) route 3 (three) times daily.    fluocinonide (LIDEX) 0.05 % external solution Apply topically 2 (two) times daily. Use on scalp    loratadine (CLARITIN) 10 mg tablet Take 1 tablet (10 mg total) by mouth once daily.    mupirocin (BACTROBAN) 2 % ointment Apply topically 2 (two) times daily.    ondansetron (ZOFRAN-ODT) 4 MG TbDL Take 1 tablet (4 mg total) by mouth every 8 (eight) hours as needed (nausea/vomiting).    [DISCONTINUED] isosorbide-hydrALAZINE 20-37.5 mg (BIDIL) 20-37.5 mg Tab Take 1 tablet by mouth 3 (three) times daily.     Family History       Problem Relation (Age of Onset)    Cancer Maternal Grandmother    Diabetes Mother    Heart disease Mother    Hyperlipidemia Mother    Hypertension Mother    Muscular dystrophy Son          Tobacco Use    Smoking status: Former     Current packs/day: 0.00     Average packs/day: 1.5 packs/day for 30.0 years (45.0 ttl pk-yrs)     Types: Cigarettes     Start date: 1960     Quit date: 1990     Years since quittin.6    Smokeless tobacco: Former   Substance and Sexual Activity "    Alcohol use: No     Alcohol/week: 0.0 standard drinks of alcohol    Drug use: No    Sexual activity: Not Currently     Review of Systems   Constitutional:  Positive for fatigue.   Respiratory:  Positive for chest tightness and shortness of breath.    Genitourinary:  Positive for dysuria.   All other systems reviewed and are negative.    Objective:     Vital Signs (Most Recent):  Temp: 98.3 °F (36.8 °C) (08/09/23 1200)  Pulse: 69 (08/09/23 1338)  Resp: 18 (08/09/23 1338)  BP: (!) 172/64 (08/09/23 1200)  SpO2: (!) 94 % (08/09/23 1200) Vital Signs (24h Range):  Temp:  [98.3 °F (36.8 °C)] 98.3 °F (36.8 °C)  Pulse:  [69-72] 69  Resp:  [18] 18  SpO2:  [94 %] 94 %  BP: (172)/(64) 172/64     Weight: 65.4 kg (144 lb 2.9 oz)  Body mass index is 24.75 kg/m².     Physical Exam  Vitals and nursing note reviewed.   Constitutional:       General: She is not in acute distress.     Appearance: She is well-developed. She is ill-appearing.   HENT:      Head: Normocephalic and atraumatic.      Right Ear: Hearing and external ear normal.      Left Ear: Hearing and external ear normal.      Nose: No rhinorrhea.      Right Sinus: No maxillary sinus tenderness or frontal sinus tenderness.      Left Sinus: No maxillary sinus tenderness or frontal sinus tenderness.      Mouth/Throat:      Mouth: No oral lesions.      Pharynx: Uvula midline.   Eyes:      General:         Right eye: No discharge.         Left eye: No discharge.      Conjunctiva/sclera: Conjunctivae normal.      Pupils: Pupils are equal, round, and reactive to light.   Cardiovascular:      Rate and Rhythm: Normal rate and regular rhythm.      Pulses:           Dorsalis pedis pulses are 2+ on the right side and 2+ on the left side.      Heart sounds: Normal heart sounds, S1 normal and S2 normal. No murmur heard.  Pulmonary:      Effort: Pulmonary effort is normal. No respiratory distress.      Breath sounds: Normal breath sounds.   Abdominal:      General: Bowel sounds are  normal. There is no distension.      Palpations: Abdomen is soft. There is no mass.      Tenderness: There is no abdominal tenderness.   Musculoskeletal:         General: Normal range of motion.      Cervical back: Normal range of motion.   Lymphadenopathy:      Upper Body:      Right upper body: No supraclavicular adenopathy.      Left upper body: No supraclavicular adenopathy.   Skin:     General: Skin is warm and dry.      Capillary Refill: Capillary refill takes less than 2 seconds.      Findings: No rash.   Neurological:      Mental Status: She is alert and oriented to person, place, and time.      Sensory: No sensory deficit.      Coordination: Coordination normal.      Gait: Gait normal.   Psychiatric:         Mood and Affect: Mood is not anxious or depressed.         Speech: Speech normal.         Behavior: Behavior normal.         Thought Content: Thought content normal.         Judgment: Judgment normal.              CRANIAL NERVES     CN III, IV, VI   Pupils are equal, round, and reactive to light.       Significant Labs: All pertinent labs within the past 24 hours have been reviewed.  CBC:   Recent Labs   Lab 08/09/23  1227   WBC 9.00   HGB 9.4*   HCT 28.6*        CMP:   Recent Labs   Lab 08/09/23  1227      K 6.7*      CO2 18*   *   BUN 80*   CREATININE 11.1*   CALCIUM 10.2   PROT 7.6   ALBUMIN 3.5   BILITOT 0.3   ALKPHOS 76   AST 13   ALT 10   ANIONGAP 18*       Significant Imaging: I have reviewed all pertinent imaging results/findings within the past 24 hours.

## 2023-08-09 NOTE — HPI
78 y.o. female patient with a PMHx of acute hypoxemic respiratory failure, heart failure, ESRD on dialysis M/W/F, CAD s/p CABG, DM2, HLD, HTN, seizures, and CVA who presents to the Emergency Department for SOB which onset 5 days ago. Pt reports that she missed her last 2 dialysis appointments, on 8/4/23 and 8/7/23, because she was short of breath. Pt reports that she dialyzes at [facility] and is unsure of who her Nephrologist is. Symptoms are constant and moderate in severity. No mitigating or exacerbating factors reported. No associated sxs reported. Patient denies any CP, fever, chills, N/V/D, weakness, and all other sxs at this time. No prior Tx reported.Of note, patient presented to PCP on 8/3/23 with c/o weakness, foul odor to urine. UA ordered, was not collected. Reports continued foul odor. In the ED, Labs: Hgb: 9.4, K+: 6.7, BUN: 80, Cr: 11.1, BNP: >4900, trop: 0.258, CXR: Cardiomegaly with developing bilateral pleural effusions and suspected pulmonary venous hypertension. Nephrology consulted, HD performed in ED. Continued weakness/fatigue. Patient is a full code, surrogate decision maker is her daughter, Elizabeth Monzon. Placed in Observation under the care of Hospital Medicine for management of Hyperkalemia, Fatigue.

## 2023-08-09 NOTE — ED PROVIDER NOTES
SCRIBE #1 NOTE: I, Paige Mcgill, am scribing for, and in the presence of, Lizzette Holder MD. I have scribed the entire note.      History      Chief Complaint   Patient presents with    Shortness of Breath     Patient presents with SOB since Friday, ESRD on HD MWF, patient missed dialysis today and Monday.       Review of patient's allergies indicates:   Allergen Reactions    Codeine Swelling    Darvon [propoxyphene] Swelling    Atorvastatin      Muscle twitching        HPI   HPI    8/9/2023, 12:38 PM   History obtained from the patient      History of Present Illness: Avril Monzon is a 78 y.o. female patient with a PMHx of acute hypoxemic respiratory failure, heart failure, ESRD on dialysis M/W/F, CAD s/p CABG, DM2, HLD, HTN, seizures, and CVA who presents to the Emergency Department for SOB which onset 5 days ago. Pt reports that she missed her last 2 dialysis appointments, on 8/4/23 and 8/7/23, because she was short of breath. Pt reports that she dialyzes at [facility] and is unsure of who her Nephrologist is. Symptoms are constant and moderate in severity. No mitigating or exacerbating factors reported. No associated sxs reported. Patient denies any CP, fever, chills, N/V/D, weakness, and all other sxs at this time. No prior Tx reported. No further complaints or concerns at this time.         Arrival mode: EMS    PCP: Rose Germain MD       Past Medical History:  Past Medical History:   Diagnosis Date    Acute hypoxemic respiratory failure 11/26/2018    Anemia     Arthritis     back, hand, knees    Back pain     Cataract     CKD stage G4/A3, GFR 15 - 29 and albumin creatinine ratio >300 mg/g     GFR 40% Jan 2014 and 33% ub 3/2014 ([facility])    Coronary artery disease involving native coronary artery of native heart 11/30/2018    Diabetic retinopathy     DM (diabetes mellitus) type II controlled, neurological manifestation     Exudative age-related macular degeneration of left eye with active choroidal  neovascularization 2/5/2019    GERD (gastroesophageal reflux disease)     Glaucoma     Heart failure     Hyperlipidemia     Hypertension     Non-ST elevation MI (NSTEMI) 11/28/2018    NSTEMI (non-ST elevated myocardial infarction) 11/27/2018    Peripheral neuropathy     Polyneuropathy     Proteinuria     >6 mo     Seizures     BRG 1/2014 -dick CT NRI showed small vessel    Skin ulcer of toe of right foot with fat layer exposed 10/14/2022    Stroke 2012,2014    Tobacco dependence     Type 2 diabetes with peripheral circulatory disorder, controlled        Past Surgical History:  Past Surgical History:   Procedure Laterality Date    BREAST LUMPECTOMY Left     benign, when patient was 15 years old    BREAST MASS EXCISION      ,benign, age 15.    CATHETERIZATION OF BOTH LEFT AND RIGHT HEART N/A 11/28/2018    Procedure: CATHETERIZATION, HEART, BOTH LEFT AND RIGHT;  Surgeon: Michelle Holman MD;  Location: [facility]ATH [facility];  Service: Cardiology;  Laterality: N/A;    CATHETERIZATION OF BOTH LEFT AND RIGHT HEART N/A 11/30/2018    Procedure: CATHETERIZATION, HEART, BOTH LEFT AND RIGHT;  Surgeon: Michelle Holman MD;  Location: [facility];  Service: Cardiology;  Laterality: N/A;    CORONARY ARTERY BYPASS GRAFT      CORONARY ARTERY BYPASS GRAFT (CABG) N/A 7/20/2022    Procedure: CORONARY ARTERY BYPASS GRAFT (CABG);  Surgeon: Sanju Locke MD;  Location: [facility] OR;  Service: Cardiothoracic;  Laterality: N/A;  2-VESSEL PUMP ASSIST WITH EPI-AORTIC ULTRASOUND    ENDOSCOPIC HARVEST OF VEIN Left 7/20/2022    Procedure: SURGICAL PROCUREMENT, VEIN, ENDOSCOPIC;  Surgeon: Sanju Locke MD;  Location: [facility];  Service: Cardiothoracic;  Laterality: Left;    HYSTERECTOMY  1982    INJECTION OF ANESTHETIC AGENT AROUND MULTIPLE INTERCOSTAL NERVES N/A 7/20/2022    Procedure: BLOCK, NERVE, INTERCOSTAL, 2 OR MORE;  Surgeon: Sanju Locke MD;  Location: [facility] OR;  Service: Cardiothoracic;  Laterality: N/A;  PARASTERNAL NERVE BLOCK     INSERTION OF SHUNT      LEFT HEART CATHETERIZATION Left 12/3/2018    Procedure: CATHETERIZATION, HEART, LEFT;  Surgeon: Michelle Holman MD;  Location: [facility] CATH [facility];  Service: Cardiology;  Laterality: Left;  LAD ATHERECTOMY STENT/ FEMORAL APPROACH    LEFT HEART CATHETERIZATION Left 2022    Procedure: CATHETERIZATION, HEART, LEFT;  Surgeon: Abhi Sloan MD;  Location: [facility] CATH LAB;  Service: Cardiology;  Laterality: Left;    OOPHORECTOMY      wrist cyst Right 1980s    dorsal wrist cyst         Family History:  Family History   Problem Relation Age of Onset    Diabetes Mother     Hyperlipidemia Mother     Hypertension Mother     Heart disease Mother         MI    Muscular dystrophy Son     Cancer Maternal Grandmother         colon       Social History:  Social History     Tobacco Use    Smoking status: Former     Current packs/day: 0.00     Average packs/day: 1.5 packs/day for 30.0 years (45.0 ttl pk-yrs)     Types: Cigarettes     Start date: 1960     Quit date: 1990     Years since quittin.6    Smokeless tobacco: Former   Substance and Sexual Activity    Alcohol use: No     Alcohol/week: 0.0 standard drinks of alcohol    Drug use: No    Sexual activity: Not Currently       ROS   Review of Systems   Constitutional:  Negative for chills and fever.   HENT:  Negative for sore throat.    Respiratory:  Positive for shortness of breath.    Cardiovascular:  Negative for chest pain.   Gastrointestinal:  Negative for diarrhea, nausea and vomiting.   Genitourinary:  Negative for dysuria.   Musculoskeletal:  Negative for back pain.   Skin:  Negative for rash.   Neurological:  Negative for weakness.   Hematological:  Does not bruise/bleed easily.   All other systems reviewed and are negative.      Physical Exam      Initial Vitals [23 1200]   BP Pulse Resp Temp SpO2   (!) 172/64 72 18 98.3 °F (36.8 °C) (!) 94 %      MAP       --          Physical Exam  Nursing Notes and Vital Signs  Reviewed.  Constitutional: Patient is in no obvious distress. Well-developed and well-nourished.  Head: Atraumatic. Normocephalic.  Eyes: PERRL. EOM intact. Conjunctivae are not pale. No scleral icterus.  ENT: Mucous membranes are moist. Oropharynx is clear and symmetric.    Neck: Supple. Full ROM. No lymphadenopathy.  Cardiovascular: Regular rate. Regular rhythm. No murmurs, rubs, or gallops. Distal pulses are 2+ and symmetric. There is a fistula to the LUE with a good thrill.  Pulmonary/Chest: No respiratory distress. Clear to auscultation bilaterally. No wheezing or rales.  Abdominal: Soft and non-distended.  There is no tenderness.  No rebound, guarding, or rigidity.  Musculoskeletal: Moves all extremities. No obvious deformities. No edema.  Skin: Warm and dry.  Neurological:  Alert, awake, and appropriate.  Normal speech.  No acute focal neurological deficits are appreciated.  Psychiatric: Normal affect. Good eye contact. Appropriate in content.    ED Course    Critical Care    Date/Time: 8/9/2023 1:07 PM    Performed by: Lizzette Holder MD  Authorized by: Lizzette Holder MD  Direct patient critical care time: 25 minutes  Additional history critical care time: 10 minutes  Ordering / reviewing critical care time: 12 minutes  Documentation critical care time: 7 minutes  Consulting other physicians critical care time: 9 minutes  Total critical care time (exclusive of procedural time) : 63 minutes  Critical care time was exclusive of teaching time and separately billable procedures and treating other patients.  Critical care was necessary to treat or prevent imminent or life-threatening deterioration of the following conditions: renal failure and cardiac failure (hyperkalemia).  Critical care was time spent personally by me on the following activities: blood draw for specimens, development of treatment plan with patient or surrogate, discussions with consultants, interpretation of cardiac output measurements,  evaluation of patient's response to treatment, examination of patient, obtaining history from patient or surrogate, ordering and performing treatments and interventions, ordering and review of laboratory studies, ordering and review of radiographic studies, pulse oximetry, re-evaluation of patient's condition and review of old charts.        ED Vital Signs:  Vitals:    08/09/23 1200 08/09/23 1238 08/09/23 1308 08/09/23 1338   BP: (!) 172/64 (!) 168/72     Pulse: 72 72  69   Resp: 18 18  18   Temp: 98.3 °F (36.8 °C) 98.2 °F (36.8 °C)     TempSrc: Oral Oral     SpO2: (!) 94% (!) 94%     Weight:   65.4 kg (144 lb 2.9 oz)     08/09/23 1800   BP: (!) 172/78   Pulse: 72   Resp: 18   Temp: 98.1 °F (36.7 °C)   TempSrc: Oral   SpO2: (!) 94%   Weight:        Abnormal Lab Results:  Labs Reviewed   CBC W/ AUTO DIFFERENTIAL - Abnormal; Notable for the following components:       Result Value    RBC 2.84 (*)     Hemoglobin 9.4 (*)     Hematocrit 28.6 (*)      (*)     MCH 33.1 (*)     Gran # (ANC) 7.8 (*)     Lymph # 0.6 (*)     Gran % 87.0 (*)     Lymph % 6.3 (*)     All other components within normal limits   COMPREHENSIVE METABOLIC PANEL - Abnormal; Notable for the following components:    Potassium 6.7 (*)     CO2 18 (*)     Glucose 178 (*)     BUN 80 (*)     Creatinine 11.1 (*)     eGFR 3 (*)     Anion Gap 18 (*)     All other components within normal limits    Narrative:     K critical result(s) called and verbal readback obtained from   YOLIS PARKER RN by CPSunita 08/09/2023 13:03   TROPONIN I - Abnormal; Notable for the following components:    Troponin I 0.244 (*)     All other components within normal limits   B-TYPE NATRIURETIC PEPTIDE - Abnormal; Notable for the following components:    BNP >4,900 (*)     All other components within normal limits   MAGNESIUM - Abnormal; Notable for the following components:    Magnesium 3.4 (*)     All other components within normal limits   PHOSPHORUS - Abnormal; Notable for the  following components:    Phosphorus 5.8 (*)     All other components within normal limits   TROPONIN I - Abnormal; Notable for the following components:    Troponin I 0.258 (*)     All other components within normal limits   POCT GLUCOSE - Abnormal; Notable for the following components:    POCT Glucose 211 (*)     All other components within normal limits   HEPATITIS B SURFACE ANTIGEN   HEPATITIS B SURFACE ANTIBODY, QUAL/QUANT   HEPATITIS PANEL, ACUTE   URINALYSIS, REFLEX TO URINE CULTURE   POCT GLUCOSE MONITORING CONTINUOUS        All Lab Results:  Results for orders placed or performed during the hospital encounter of 08/09/23   CBC auto differential   Result Value Ref Range    WBC 9.00 3.90 - 12.70 K/uL    RBC 2.84 (L) 4.00 - 5.40 M/uL    Hemoglobin 9.4 (L) 12.0 - 16.0 g/dL    Hematocrit 28.6 (L) 37.0 - 48.5 %     (H) 82 - 98 fL    MCH 33.1 (H) 27.0 - 31.0 pg    MCHC 32.9 32.0 - 36.0 g/dL    RDW 13.9 11.5 - 14.5 %    Platelets 196 150 - 450 K/uL    MPV 9.4 9.2 - 12.9 fL    Immature Granulocytes 0.4 0.0 - 0.5 %    Gran # (ANC) 7.8 (H) 1.8 - 7.7 K/uL    Immature Grans (Abs) 0.04 0.00 - 0.04 K/uL    Lymph # 0.6 (L) 1.0 - 4.8 K/uL    Mono # 0.5 0.3 - 1.0 K/uL    Eos # 0.0 0.0 - 0.5 K/uL    Baso # 0.02 0.00 - 0.20 K/uL    nRBC 0 0 /100 WBC    Gran % 87.0 (H) 38.0 - 73.0 %    Lymph % 6.3 (L) 18.0 - 48.0 %    Mono % 6.0 4.0 - 15.0 %    Eosinophil % 0.1 0.0 - 8.0 %    Basophil % 0.2 0.0 - 1.9 %    Differential Method Automated    Comprehensive metabolic panel   Result Value Ref Range    Sodium 139 136 - 145 mmol/L    Potassium 6.7 (HH) 3.5 - 5.1 mmol/L    Chloride 103 95 - 110 mmol/L    CO2 18 (L) 23 - 29 mmol/L    Glucose 178 (H) 70 - 110 mg/dL    BUN 80 (H) 8 - 23 mg/dL    Creatinine 11.1 (H) 0.5 - 1.4 mg/dL    Calcium 10.2 8.7 - 10.5 mg/dL    Total Protein 7.6 6.0 - 8.4 g/dL    Albumin 3.5 3.5 - 5.2 g/dL    Total Bilirubin 0.3 0.1 - 1.0 mg/dL    Alkaline Phosphatase 76 55 - 135 U/L    AST 13 10 - 40 U/L    ALT  10 10 - 44 U/L    eGFR 3 (A) >60 mL/min/1.73 m^2    Anion Gap 18 (H) 8 - 16 mmol/L   Troponin I #1   Result Value Ref Range    Troponin I 0.244 (H) 0.000 - 0.026 ng/mL   BNP   Result Value Ref Range    BNP >4,900 (H) 0 - 99 pg/mL   Magnesium   Result Value Ref Range    Magnesium 3.4 (H) 1.6 - 2.6 mg/dL   Phosphorus   Result Value Ref Range    Phosphorus 5.8 (H) 2.7 - 4.5 mg/dL   Troponin I   Result Value Ref Range    Troponin I 0.258 (H) 0.000 - 0.026 ng/mL   POCT glucose   Result Value Ref Range    POCT Glucose 211 (H) 70 - 110 mg/dL     *Note: Due to a large number of results and/or encounters for the requested time period, some results have not been displayed. A complete set of results can be found in Results Review.         Imaging Results:  Imaging Results              X-Ray Chest AP Portable (Final result)  Result time 08/09/23 13:15:02      Final result by Terry Rg III, MD (08/09/23 13:15:02)                   Impression:      Cardiomegaly with developing bilateral pleural effusions and suspected pulmonary venous hypertension.      Electronically signed by: Noman Rg  Date:    08/09/2023  Time:    13:15               Narrative:    EXAMINATION:  XR CHEST AP PORTABLE    CLINICAL HISTORY:  shortness of breath;    TECHNIQUE:  Single frontal view of the chest was performed.    COMPARISON:  08/04/2023    FINDINGS:  Midline sternotomy wires.  The cardiac silhouette is enlarged, but unchanged.  Developing small bilateral pleural effusions.  Developing fluid within the right major or minor fissure.  Mild cephalization and interstitial prominence suggesting pulmonary venous hypertension.  No pneumothorax or airspace consolidation.                                     The EKG was ordered, reviewed, and independently interpreted by the ED provider.  Interpretation time: 12:55  Rate: 72 BPM  Rhythm: normal sinus rhythm  Interpretation: T wave abnormality, consider inferior ischemia. No STEMI.            The Emergency Provider reviewed the vital signs and test results, which are outlined above.    ED Discussion     1:12 PM: Discussed pt's case with Dr. Ramirez (Nephrology) who states that the HD nurse is aware of the pt's case.    5:35 PM: Discussed case with Dr. Mcgill (Hospital Medicine). Dr. Mcgill agrees with current care and management of pt and accepts admission.   Admitting Service: Hospital Medicine  Admitting Physician: Dr. Mcgill  Admit to: Obs Med Surg    5:42 PM: Re-evaluated pt. I have discussed test results, shared treatment plan, and the need for admission with patient and family at bedside. Pt and family express understanding at this time and agree with all information. All questions answered. Pt and family have no further questions or concerns at this time. Pt is ready for admit.          ED Medication(s):  Medications   calcium gluconate 1 g in NS IVPB (premixed) (0 g Intravenous Stopped 8/9/23 1351)     And   calcium gluconate 1 g in NS IVPB (premixed) (has no administration in time range)   dextrose 10% bolus 250 mL 250 mL (0 mLs Intravenous Stopped 8/9/23 1440)     And   dextrose 10% bolus 250 mL 250 mL (has no administration in time range)     And   insulin regular injection 6.54 Units 0.0654 mL (6.54 Units Intravenous Given 8/9/23 1339)   aspirin EC tablet 81 mg (has no administration in time range)   folic acid tablet 1 mg (has no administration in time range)   metoprolol tartrate (LOPRESSOR) tablet 50 mg (has no administration in time range)   ondansetron disintegrating tablet 4 mg (has no administration in time range)   sevelamer carbonate tablet 2,400 mg (has no administration in time range)   sodium chloride 0.9% flush 10 mL (has no administration in time range)   melatonin tablet 6 mg (has no administration in time range)   polyethylene glycol packet 17 g (has no administration in time range)   aluminum-magnesium hydroxide-simethicone 200-200-20 mg/5 mL suspension 30 mL (has no  administration in time range)   acetaminophen tablet 650 mg (has no administration in time range)   naloxone 0.4 mg/mL injection 0.02 mg (has no administration in time range)   glucose chewable tablet 16 g (has no administration in time range)   glucose chewable tablet 24 g (has no administration in time range)   glucagon (human recombinant) injection 1 mg (has no administration in time range)   enoxaparin injection 30 mg (has no administration in time range)   insulin aspart U-100 pen 1-10 Units (has no administration in time range)   sodium zirconium cyclosilicate packet 10 g (10 g Oral Given 8/9/23 1333)   albuterol nebulizer solution 10 mg (10 mg Nebulization Given 8/9/23 1338)          New Prescriptions    No medications on file           Medical Decision Making    Medical Decision Making:   Differential Diagnosis:   Volume overload  Electrolyte abnormality  CHF  ACS  Clinical Tests:   Lab Tests: Ordered and Reviewed  Radiological Study: Ordered and Reviewed  Medical Tests: Ordered and Reviewed  ED Management:  Hx of ESRD on dialysis M,W, F, CAD, HTN, missed last 2 dialysis sessions presents with weakness, shortness of breath, acute hyperkalemia noted, troponin also elevated, ECG reviewed and chronic ischemic changes noted, patient does not have chest pain, but does have shortness of breath and weakness likely from hyperkalemia and volume overload, cxr consistent with pulmonary edema, nephrology consulted and patient emergently dialyze din the ER, treated potassium before hand and troponin trended, hospital medicine consulted for admission.            Scribe Attestation:   Scribe #1: I performed the above scribed service and the documentation accurately describes the services I performed. I attest to the accuracy of the note.    Attending:   Physician Attestation Statement for Scribe #1: I, Lizzette Holder MD, personally performed the services described in this documentation, as scribed by Paige Mcgill, in my  presence, and it is both accurate and complete.          Clinical Impression       ICD-10-CM ICD-9-CM   1. Acute hyperkalemia  E87.5 276.7   2. Shortness of breath  R06.02 786.05   3. Chest pain  R07.9 786.50   4. Elevated troponin  R77.8 790.6   5. ESRD (end stage renal disease) on dialysis  N18.6 585.6    Z99.2 V45.11   6. Noncompliance with renal dialysis  Z91.158 V45.12   7. Chronic hypertension  I10 401.9       Disposition:   Disposition: Placed in Observation  Condition: Lizzette Chase MD  08/09/23 [id number]

## 2023-08-09 NOTE — ASSESSMENT & PLAN NOTE
Presented to PCP on 8/3/23 with c/o foul smelling urine, patient unable to provide urine sample. Reports continued foul odor, denies fever. SInce patient had HD in ED, may not be able to produce urine sample until the AM    --UA reflex to culture

## 2023-08-09 NOTE — NURSING
08/09/23 1640   Post-Hemodialysis Assessment   Duration of Treatment 120 minutes   Net Fluid Removal 2000   Post-Hemodialysis Comments tx complete, 2 hour treatment, net fluid removed=2liters, vss, pt tolerated well

## 2023-08-09 NOTE — ASSESSMENT & PLAN NOTE
Patient's FSGs are controlled on current medication regimen.  Last A1c reviewed-   Lab Results   Component Value Date    HGBA1C 6.6 (H) 04/04/2023     Most recent fingerstick glucose reviewed-   Recent Labs   Lab 08/09/23  1429   POCTGLUCOSE 211*     Current correctional scale  Medium  Maintain anti-hyperglycemic dose as follows-   Antihyperglycemics (From admission, onward)    Start     Stop Route Frequency Ordered    08/09/23 1834  insulin aspart U-100 pen 1-10 Units         -- SubQ Before meals & nightly PRN 08/09/23 1735        Hold Oral hypoglycemics while patient is in the hospital.

## 2023-08-10 ENCOUNTER — TELEPHONE (OUTPATIENT)
Dept: PRIMARY CARE CLINIC | Facility: CLINIC | Age: 78
End: 2023-08-10
Payer: MEDICARE

## 2023-08-10 ENCOUNTER — TELEPHONE (OUTPATIENT)
Dept: CARDIOLOGY | Facility: CLINIC | Age: 78
End: 2023-08-10

## 2023-08-10 VITALS
SYSTOLIC BLOOD PRESSURE: 124 MMHG | RESPIRATION RATE: 17 BRPM | BODY MASS INDEX: 24.65 KG/M2 | HEIGHT: 64 IN | TEMPERATURE: 99 F | DIASTOLIC BLOOD PRESSURE: 60 MMHG | WEIGHT: 144.38 LBS | OXYGEN SATURATION: 94 % | HEART RATE: 75 BPM

## 2023-08-10 LAB
ALBUMIN SERPL BCP-MCNC: 3 G/DL (ref 3.5–5.2)
ALP SERPL-CCNC: 67 U/L (ref 55–135)
ALT SERPL W/O P-5'-P-CCNC: 5 U/L (ref 10–44)
ANION GAP SERPL CALC-SCNC: 16 MMOL/L (ref 8–16)
AST SERPL-CCNC: 9 U/L (ref 10–40)
BASOPHILS # BLD AUTO: 0.02 K/UL (ref 0–0.2)
BASOPHILS NFR BLD: 0.3 % (ref 0–1.9)
BILIRUB SERPL-MCNC: 0.4 MG/DL (ref 0.1–1)
BUN SERPL-MCNC: 51 MG/DL (ref 8–23)
CALCIUM SERPL-MCNC: 9.4 MG/DL (ref 8.7–10.5)
CHLORIDE SERPL-SCNC: 99 MMOL/L (ref 95–110)
CO2 SERPL-SCNC: 22 MMOL/L (ref 23–29)
CREAT SERPL-MCNC: 8.4 MG/DL (ref 0.5–1.4)
DIFFERENTIAL METHOD: ABNORMAL
EOSINOPHIL # BLD AUTO: 0 K/UL (ref 0–0.5)
EOSINOPHIL NFR BLD: 0.7 % (ref 0–8)
ERYTHROCYTE [DISTWIDTH] IN BLOOD BY AUTOMATED COUNT: 14 % (ref 11.5–14.5)
EST. GFR  (NO RACE VARIABLE): 5 ML/MIN/1.73 M^2
GLUCOSE SERPL-MCNC: 138 MG/DL (ref 70–110)
HAV IGM SERPL QL IA: NORMAL
HBV CORE IGM SERPL QL IA: NORMAL
HBV SURFACE AG SERPL QL IA: NORMAL
HBV SURFACE AG SERPL QL IA: NORMAL
HCT VFR BLD AUTO: 27.3 % (ref 37–48.5)
HCV AB SERPL QL IA: NORMAL
HGB BLD-MCNC: 8.8 G/DL (ref 12–16)
IMM GRANULOCYTES # BLD AUTO: 0.03 K/UL (ref 0–0.04)
IMM GRANULOCYTES NFR BLD AUTO: 0.5 % (ref 0–0.5)
LYMPHOCYTES # BLD AUTO: 1 K/UL (ref 1–4.8)
LYMPHOCYTES NFR BLD: 16.7 % (ref 18–48)
MAGNESIUM SERPL-MCNC: 2.7 MG/DL (ref 1.6–2.6)
MCH RBC QN AUTO: 32.6 PG (ref 27–31)
MCHC RBC AUTO-ENTMCNC: 32.2 G/DL (ref 32–36)
MCV RBC AUTO: 101 FL (ref 82–98)
MONOCYTES # BLD AUTO: 0.6 K/UL (ref 0.3–1)
MONOCYTES NFR BLD: 9.4 % (ref 4–15)
NEUTROPHILS # BLD AUTO: 4.3 K/UL (ref 1.8–7.7)
NEUTROPHILS NFR BLD: 72.4 % (ref 38–73)
NRBC BLD-RTO: 0 /100 WBC
PHOSPHATE SERPL-MCNC: 5.3 MG/DL (ref 2.7–4.5)
PLATELET # BLD AUTO: 205 K/UL (ref 150–450)
PMV BLD AUTO: 9 FL (ref 9.2–12.9)
POCT GLUCOSE: 152 MG/DL (ref 70–110)
POCT GLUCOSE: 265 MG/DL (ref 70–110)
POTASSIUM SERPL-SCNC: 4.6 MMOL/L (ref 3.5–5.1)
PROT SERPL-MCNC: 6.6 G/DL (ref 6–8.4)
RBC # BLD AUTO: 2.7 M/UL (ref 4–5.4)
SODIUM SERPL-SCNC: 137 MMOL/L (ref 136–145)
WBC # BLD AUTO: 5.87 K/UL (ref 3.9–12.7)

## 2023-08-10 PROCEDURE — 99232 SBSQ HOSP IP/OBS MODERATE 35: CPT | Mod: HCNC,,, | Performed by: INTERNAL MEDICINE

## 2023-08-10 PROCEDURE — 97530 THERAPEUTIC ACTIVITIES: CPT | Mod: HCNC

## 2023-08-10 PROCEDURE — 85025 COMPLETE CBC W/AUTO DIFF WBC: CPT | Mod: HCNC | Performed by: NURSE PRACTITIONER

## 2023-08-10 PROCEDURE — 97165 OT EVAL LOW COMPLEX 30 MIN: CPT | Mod: HCNC

## 2023-08-10 PROCEDURE — 99213 PR OFFICE/OUTPT VISIT, EST, LEVL III, 20-29 MIN: ICD-10-PCS | Mod: HCNC,,, | Performed by: INTERNAL MEDICINE

## 2023-08-10 PROCEDURE — G0378 HOSPITAL OBSERVATION PER HR: HCPCS | Mod: HCNC

## 2023-08-10 PROCEDURE — 25000003 PHARM REV CODE 250: Mod: HCNC | Performed by: NURSE PRACTITIONER

## 2023-08-10 PROCEDURE — 96372 THER/PROPH/DIAG INJ SC/IM: CPT | Performed by: NURSE PRACTITIONER

## 2023-08-10 PROCEDURE — 97161 PT EVAL LOW COMPLEX 20 MIN: CPT | Mod: HCNC

## 2023-08-10 PROCEDURE — 99213 OFFICE O/P EST LOW 20 MIN: CPT | Mod: HCNC,,, | Performed by: INTERNAL MEDICINE

## 2023-08-10 PROCEDURE — 83735 ASSAY OF MAGNESIUM: CPT | Mod: HCNC | Performed by: NURSE PRACTITIONER

## 2023-08-10 PROCEDURE — 99232 PR SUBSEQUENT HOSPITAL CARE,LEVL II: ICD-10-PCS | Mod: HCNC,,, | Performed by: INTERNAL MEDICINE

## 2023-08-10 PROCEDURE — 63600175 PHARM REV CODE 636 W HCPCS: Mod: HCNC | Performed by: NURSE PRACTITIONER

## 2023-08-10 PROCEDURE — 80053 COMPREHEN METABOLIC PANEL: CPT | Mod: HCNC | Performed by: NURSE PRACTITIONER

## 2023-08-10 PROCEDURE — 84100 ASSAY OF PHOSPHORUS: CPT | Mod: HCNC | Performed by: NURSE PRACTITIONER

## 2023-08-10 PROCEDURE — 97110 THERAPEUTIC EXERCISES: CPT | Mod: HCNC

## 2023-08-10 PROCEDURE — 36415 COLL VENOUS BLD VENIPUNCTURE: CPT | Mod: HCNC | Performed by: NURSE PRACTITIONER

## 2023-08-10 RX ORDER — HEPARIN SODIUM 5000 [USP'U]/ML
5000 INJECTION, SOLUTION INTRAVENOUS; SUBCUTANEOUS EVERY 8 HOURS
Status: DISCONTINUED | OUTPATIENT
Start: 2023-08-10 | End: 2023-08-10 | Stop reason: HOSPADM

## 2023-08-10 RX ADMIN — SEVELAMER CARBONATE 2400 MG: 800 TABLET, FILM COATED ORAL at 09:08

## 2023-08-10 RX ADMIN — METOPROLOL TARTRATE 50 MG: 50 TABLET, FILM COATED ORAL at 09:08

## 2023-08-10 RX ADMIN — INSULIN ASPART 2 UNITS: 100 INJECTION, SOLUTION INTRAVENOUS; SUBCUTANEOUS at 06:08

## 2023-08-10 RX ADMIN — FOLIC ACID 1 MG: 1 TABLET ORAL at 09:08

## 2023-08-10 RX ADMIN — SEVELAMER CARBONATE 2400 MG: 800 TABLET, FILM COATED ORAL at 11:08

## 2023-08-10 RX ADMIN — ASPIRIN 81 MG: 81 TABLET, COATED ORAL at 09:08

## 2023-08-10 RX ADMIN — POLYETHYLENE GLYCOL 3350 17 G: 17 POWDER, FOR SOLUTION ORAL at 09:08

## 2023-08-10 RX ADMIN — INSULIN ASPART 6 UNITS: 100 INJECTION, SOLUTION INTRAVENOUS; SUBCUTANEOUS at 01:08

## 2023-08-10 NOTE — CONSULTS
[facility] - Emergency Dept.  Nephrology  Consult Note    Patient Name: Avril Monzon  MRN: [id number]  Admission Date: 8/9/2023  Hospital Length of Stay: 0 days  Attending Provider: Suhas Haines,*   Primary Care Physician: Rose Germain MD  Principal Problem:Hyperkalemia    Consults  Subjective:     HPI:  78-year-old female well known to me from previous admissions.  Presented to the emergency department with weakness for the past 4 to 5 days.  She is not been to dialysis this week.  Her last dialysis treatment was Friday of last week.  She usually dialyzes on a Monday Wednesday Friday schedule.  Nephrology has been consulted for maintenance of dialysis and assistance with management of end-stage renal disease.  Other than generalized weakness she has no other complaints.  Patient was seen in the emergency department.  In bed resting comfortably.  No acute distress noted.    08/10/2023: Patient was seen in her hospital room.  In bed resting comfortably.  Eating breakfast.  No acute distress noted.  States that she feels 90% better since admission.    Past Medical History:   Diagnosis Date    Acute hypoxemic respiratory failure 11/26/2018    Anemia     Arthritis     back, hand, knees    Back pain     Cataract     CKD stage G4/A3, GFR 15 - 29 and albumin creatinine ratio >300 mg/g     GFR 40% Jan 2014 and 33% ub 3/2014 (BRG)    Coronary artery disease involving native coronary artery of native heart 11/30/2018    Diabetic retinopathy     DM (diabetes mellitus) type II controlled, neurological manifestation     Exudative age-related macular degeneration of left eye with active choroidal neovascularization 2/5/2019    GERD (gastroesophageal reflux disease)     Glaucoma     Heart failure     Hyperlipidemia     Hypertension     Non-ST elevation MI (NSTEMI) 11/28/2018    NSTEMI (non-ST elevated myocardial infarction) 11/27/2018    Peripheral neuropathy     Polyneuropathy     Proteinuria     >6 mo     Seizures      BRG 1/2014 -dick CT NRI showed small vessel    Skin ulcer of toe of right foot with fat layer exposed 10/14/2022    Stroke 2012,2014    Tobacco dependence     Type 2 diabetes with peripheral circulatory disorder, controlled        Past Surgical History:   Procedure Laterality Date    BREAST LUMPECTOMY Left     benign, when patient was 15 years old    BREAST MASS EXCISION      ,benign, age 15.    CATHETERIZATION OF BOTH LEFT AND RIGHT HEART N/A 11/28/2018    Procedure: CATHETERIZATION, HEART, BOTH LEFT AND RIGHT;  Surgeon: Michelle Holman MD;  Location: [facility] CATH LAB;  Service: Cardiology;  Laterality: N/A;    CATHETERIZATION OF BOTH LEFT AND RIGHT HEART N/A 11/30/2018    Procedure: CATHETERIZATION, HEART, BOTH LEFT AND RIGHT;  Surgeon: Michelle Holman MD;  Location: [facility] CATH LAB;  Service: Cardiology;  Laterality: N/A;    CORONARY ARTERY BYPASS GRAFT      CORONARY ARTERY BYPASS GRAFT (CABG) N/A 7/20/2022    Procedure: CORONARY ARTERY BYPASS GRAFT (CABG);  Surgeon: Sanju Locke MD;  Location: [facility] OR;  Service: Cardiothoracic;  Laterality: N/A;  2-VESSEL PUMP ASSIST WITH EPI-AORTIC ULTRASOUND    ENDOSCOPIC HARVEST OF VEIN Left 7/20/2022    Procedure: SURGICAL PROCUREMENT, VEIN, ENDOSCOPIC;  Surgeon: Sanju Locke MD;  Location: [facility] OR;  Service: Cardiothoracic;  Laterality: Left;    HYSTERECTOMY  1982    INJECTION OF ANESTHETIC AGENT AROUND MULTIPLE INTERCOSTAL NERVES N/A 7/20/2022    Procedure: BLOCK, NERVE, INTERCOSTAL, 2 OR MORE;  Surgeon: Sanju Locke MD;  Location: [facility] OR;  Service: Cardiothoracic;  Laterality: N/A;  PARASTERNAL NERVE BLOCK    INSERTION OF SHUNT      LEFT HEART CATHETERIZATION Left 12/3/2018    Procedure: CATHETERIZATION, HEART, LEFT;  Surgeon: Michelle Holman MD;  Location: [facility] CATH LAB;  Service: Cardiology;  Laterality: Left;  LAD ATHERECTOMY STENT/ FEMORAL APPROACH    LEFT HEART CATHETERIZATION Left 7/13/2022    Procedure: CATHETERIZATION, HEART, LEFT;  Surgeon: Abhi  KEVIN Sloan MD;  Location: [facility] CATH LAB;  Service: Cardiology;  Laterality: Left;    OOPHORECTOMY      wrist cyst Right 1980s    dorsal wrist cyst       Review of patient's allergies indicates:   Allergen Reactions    Codeine Swelling    Darvon [propoxyphene] Swelling    Atorvastatin      Muscle twitching     Current Facility-Administered Medications   Medication Frequency    acetaminophen tablet 650 mg Q4H PRN    aluminum-magnesium hydroxide-simethicone 200-200-20 mg/5 mL suspension 30 mL QID PRN    aspirin EC tablet 81 mg Daily    calcium gluconate 1 g in NS IVPB (premixed) Q10 Min PRN    dextrose 10% bolus 250 mL 250 mL PRN    enoxaparin injection 30 mg Daily    folic acid tablet 1 mg Daily    glucagon (human recombinant) injection 1 mg PRN    glucose chewable tablet 16 g PRN    glucose chewable tablet 24 g PRN    insulin aspart U-100 pen 1-10 Units QID (AC + HS) PRN    melatonin tablet 6 mg Nightly PRN    metoprolol tartrate (LOPRESSOR) tablet 50 mg BID    naloxone 0.4 mg/mL injection 0.02 mg PRN    ondansetron disintegrating tablet 4 mg Q8H PRN    polyethylene glycol packet 17 g Daily    sevelamer carbonate tablet 2,400 mg TID WM    sodium chloride 0.9% flush 10 mL Q12H PRN     Family History       Problem Relation (Age of Onset)    Cancer Maternal Grandmother    Diabetes Mother    Heart disease Mother    Hyperlipidemia Mother    Hypertension Mother    Muscular dystrophy Son          Tobacco Use    Smoking status: Former     Current packs/day: 0.00     Average packs/day: 1.5 packs/day for 30.0 years (45.0 ttl pk-yrs)     Types: Cigarettes     Start date: 1960     Quit date: 1990     Years since quittin.6    Smokeless tobacco: Former   Substance and Sexual Activity    Alcohol use: No     Alcohol/week: 0.0 standard drinks of alcohol    Drug use: No    Sexual activity: Not Currently     Review of Systems   Constitutional:  Positive for fatigue.   HENT: Negative.     Respiratory: Negative.      Cardiovascular: Negative.    Gastrointestinal:  Positive for constipation.   Genitourinary: Negative.    Musculoskeletal: Negative.    Skin: Negative.    Neurological:  Positive for weakness.   Hematological: Negative.      Objective:     Vital Signs (Most Recent):  Temp: 98.2 °F (36.8 °C) (08/10/23 0734)  Pulse: 68 (08/10/23 0734)  Resp: 18 (08/10/23 0734)  BP: 129/60 (08/10/23 0734)  SpO2: 95 % (08/10/23 0734) Vital Signs (24h Range):  Temp:  [98.1 °F (36.7 °C)-100.3 °F (37.9 °C)] 98.2 °F (36.8 °C)  Pulse:  [66-84] 68  Resp:  [17-18] 18  SpO2:  [90 %-95 %] 95 %  BP: (119-186)/(54-78) 129/60     Weight: 65.5 kg (144 lb 6.4 oz) (08/09/23 1959)  Body mass index is 24.79 kg/m².  Body surface area is 1.72 meters squared.    I/O last 3 completed shifts:  In: 300 [IV Piggyback:300]  Out: -     Physical Exam  Constitutional:       Appearance: Normal appearance.   HENT:      Head: Normocephalic and atraumatic.   Eyes:      General: No scleral icterus.     Extraocular Movements: Extraocular movements intact.      Pupils: Pupils are equal, round, and reactive to light.   Cardiovascular:      Rate and Rhythm: Normal rate and regular rhythm.   Pulmonary:      Effort: Pulmonary effort is normal.      Breath sounds: No stridor.   Musculoskeletal:      Right lower leg: No edema.      Left lower leg: No edema.   Skin:     General: Skin is warm and dry.   Neurological:      General: No focal deficit present.      Mental Status: She is alert and oriented to person, place, and time.         Significant Labs:  BMP:   Recent Labs   Lab 08/10/23  0639   *      K 4.6   CL 99   CO2 22*   BUN 51*   CREATININE 8.4*   CALCIUM 9.4   MG 2.7*       CBC:   Recent Labs   Lab 08/10/23  0639   WBC 5.87   RBC 2.70*   HGB 8.8*   HCT 27.3*      *   MCH 32.6*   MCHC 32.2       All labs within the past 24 hours have been reviewed.    Significant Imaging:  Labs: Reviewed      Assessment/Plan:     Active Diagnoses:     Diagnosis Date Noted POA    PRINCIPAL PROBLEM:  Hyperkalemia [E87.5] 07/12/2022 Yes    Dysuria [R30.0] 08/09/2023 Yes    Debility [R53.81] 08/09/2023 Yes    Metabolic acidosis [E87.20] 07/12/2022 Yes    Chronic combined systolic and diastolic heart failure [I50.42] 12/20/2021 Yes    Anemia in chronic kidney disease, on chronic dialysis [N18.6, D63.1, Z99.2] 05/18/2017 Not Applicable    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, with long-term current use of insulin [E11.3553, Z79.4] 05/02/2016 Not Applicable    ESRD (end stage renal disease) [N18.6]  Yes      Problems Resolved During this Admission:         1. Hyperkalemia:  Resolved after urgent dialysis yesterday.  Potassium is 4.6 this morning.    2. End-stage renal disease:  Her last dialysis treatment was yesterday.      3. Acid-base: Serum bicarbonate has improved to 22 post dialysis.      4. Volume status: No evidence of volume overload.  As per above tolerated 2 L ultrafiltration without difficulty.    Thank you for your consult.     Adrian Ramirez MD  Nephrology  [facility] - Emergency Dept.

## 2023-08-10 NOTE — HOSPITAL COURSE
79 y/o AAF admitted with Dx of hyperkalemia and weakness . She missed HD   x 1 . She is s/p HD yesterday with and improvement of her sx .  Nephrology consulted and rec to cont HD as schedule outpt  The K level is back to normal . The  troponin level are at baseline ( chronic elevated ). Pt was seen and examined at bedside . She was determined to be  suitable for d/c .

## 2023-08-10 NOTE — PLAN OF CARE
See eval for details. Pt displayed deficits with functional mobility/ transfers, deficits with adl's skills also decrease b ue strength/endurance. Recommendation:  HOME HEALTH O.T.

## 2023-08-10 NOTE — ASSESSMENT & PLAN NOTE
Secondary to non-compliance with HD, last HD session was 8/4/23, scheduled for M,W,F    --HD completed in ED  --Repeat Labs in AM

## 2023-08-10 NOTE — TELEPHONE ENCOUNTER
Please call pt and tell her CXR is normal, labs show heart failure.  Please try to set up with Cards on a Tues or Thurs, as these are her non dialysis days.  SM

## 2023-08-10 NOTE — PLAN OF CARE
[facility] - Med Surg  Discharge Final Note    Primary Care Provider: Rose Germain MD    Expected Discharge Date: 8/10/2023    Final Discharge Note (most recent)       Final Note - 08/10/23 1006          Final Note    Assessment Type Final Discharge Note     Anticipated Discharge Disposition Home or Self Care     Hospital Resources/Appts/Education Provided Appointments scheduled and added to AVS

## 2023-08-10 NOTE — ASSESSMENT & PLAN NOTE
Patient's FSGs are controlled on current medication regimen.  Last A1c reviewed-   Lab Results   Component Value Date    HGBA1C 6.6 (H) 04/04/2023     Most recent fingerstick glucose reviewed-   Recent Labs   Lab 08/09/23  1429 08/09/23  1956 08/10/23  0532   POCTGLUCOSE 211* 105 152*     Current correctional scale  Medium  Maintain anti-hyperglycemic dose as follows-   Antihyperglycemics (From admission, onward)    Start     Stop Route Frequency Ordered    08/09/23 1834  insulin aspart U-100 pen 1-10 Units         -- SubQ Before meals & nightly PRN 08/09/23 1735        Hold Oral hypoglycemics while patient is in the hospital.

## 2023-08-10 NOTE — PT/OT/SLP EVAL
Occupational Therapy Evaluation and Treatment    Name: Avril Monzon  MRN: [id number]  Admitting Diagnosis: Hyperkalemia  Recent Surgery: * No surgery found *      Recommendations:     Discharge Recommendations: home health OT  Level of Assistance Recommended: 24 hours light assistance  Discharge Equipment Recommendations: bath bench  Barriers to discharge:      Assessment:     Avril Monzon is a 78 y.o. female with a medical diagnosis of Hyperkalemia. She presents with performance deficits affecting function including weakness, impaired balance, decreased safety awareness, impaired endurance, impaired self care skills, impaired functional mobility, decreased lower extremity function, gait instability, decreased upper extremity function, pain.       Rehab Prognosis: Good; patient would benefit from acute OT services to address these deficits and reach maximum level of function.    Plan:     Patient to be seen 2 x/week to address the above listed problems via self-care/home management, therapeutic activities, therapeutic exercises  Plan of Care Expires:    Plan of Care Reviewed with: patient    Subjective     Chief Complaint: debility and generalized weakness  Patient Comments/Goals:   Pain/Comfort:       Patients cultural, spiritual, Islam conflicts given the current situation:      Social History:  Living Environment: Patient lives with their alone   in a single story home with number of outside stair(s): 0  Prior Level of Function: Prior to admission, patient was independent  Roles and Routines: Patient was not driving and not working prior to admission.  Equipment Used at Home: none  DME owned (not currently used): wheelchair  Assistance Upon Discharge:  daughter    Objective:     Communicated with nurse and epic chart review prior to session. Patient found HOB elevated with   upon OT entry to room.    General Precautions: Standard, fall   Orthopedic Precautions:     Braces: N/A    Respiratory Status: Room  air    Occupational Performance    Gait belt applied - Yes    Bed Mobility:   Rolling/Turning to Right with independence  Scooting anteriorly to EOB to have both feet planted on floor: independence  Supine to sit from right side of bed with supervision    Functional Mobility/Transfers:  Sit <> Stand Transfer with contact guard assistance with hand-held assist  Bed <> Chair Transfer using Step Transfer technique with contact guard assistance with no AD  Functional Mobility: pt ambulated 50 feet x 2 with cga at steady pace x 1 lob was able to self correct    Activities of Daily Living:  Upper Body Dressing: supervision  Lower Body Dressing: supervision    Cognitive/Visual Perceptual:  Cognitive/Psychosocial Skills:    -     Oriented to: Person, Place, Time, Situation  -     Follows Commands/attention: Follows multistep  commands  -     Communication: clear/fluent  -     Memory: No Deficits noted  -     Safety awareness/insight to disability: impaired    Physical Exam:  Balance:    -     Sitting: independence  -     Standing: contact guard assistance  Upper Extremity Range of Motion:     -       Right Upper Extremity: WFL  -       Left Upper Extremity: WFL  Upper Extremity Strength:    -       Right Upper Extremity: mmt: 3/5 grossly  -       Left Upper Extremity: mmt: 3/5 grossly   Strength:    -       Right Upper Extremity: mmt: 3/5 grossly  -       Left Upper Extremity: mmt: 3/5 grossly    AMPAC 6 Click ADL:  AMPAC Total Score: 21    Treatment & Education:  Therapist provided facilitation and instruction of proper body mechanics, energy conservation, and fall prevention strategies during tasks listed above  Patient educated on role of OT, POC, and goals for therapy  Patient educated on importance of OOB activities with staff member assistance and sitting OOB majority of the day      Patient clear to stand pivot transfer with RN/PCT, assist xstep<pivot transfers with cga .    Patient left up in chair with all  lines intact, call button in reach, and RN notified.    GOALS:   Multidisciplinary Problems       Occupational Therapy Goals          Problem: Occupational Therapy    Goal Priority Disciplines Outcome Interventions   Occupational Therapy Goal     OT, PT/OT     Description: O.T. GOALS TO BE MET BY 8-24-23  (I)  WITH TOILET TRANSFERS  (I) WITH LE DRESSING  (I) WITH UE DRESSING  PT WILL TOLERATE 1 SET X 15 REPS B UE ROM EXERCISE                       History:     Past Medical History:   Diagnosis Date    Acute hypoxemic respiratory failure 11/26/2018    Anemia     Arthritis     back, hand, knees    Back pain     Cataract     CKD stage G4/A3, GFR 15 - 29 and albumin creatinine ratio >300 mg/g     GFR 40% Jan 2014 and 33% ub 3/2014 (BRG)    Coronary artery disease involving native coronary artery of native heart 11/30/2018    Diabetic retinopathy     DM (diabetes mellitus) type II controlled, neurological manifestation     Exudative age-related macular degeneration of left eye with active choroidal neovascularization 2/5/2019    GERD (gastroesophageal reflux disease)     Glaucoma     Heart failure     Hyperlipidemia     Hypertension     Non-ST elevation MI (NSTEMI) 11/28/2018    NSTEMI (non-ST elevated myocardial infarction) 11/27/2018    Peripheral neuropathy     Polyneuropathy     Proteinuria     >6 mo     Seizures     BRG 1/2014 -dick CT NRI showed small vessel    Skin ulcer of toe of right foot with fat layer exposed 10/14/2022    Stroke 2012,2014    Tobacco dependence     Type 2 diabetes with peripheral circulatory disorder, controlled          Past Surgical History:   Procedure Laterality Date    BREAST LUMPECTOMY Left     benign, when patient was 15 years old    BREAST MASS EXCISION      ,benign, age 15.    CATHETERIZATION OF BOTH LEFT AND RIGHT HEART N/A 11/28/2018    Procedure: CATHETERIZATION, HEART, BOTH LEFT AND RIGHT;  Surgeon: Michelle Holman MD;  Location: [facility] CATH LAB;  Service: Cardiology;  Laterality:  N/A;    CATHETERIZATION OF BOTH LEFT AND RIGHT HEART N/A 11/30/2018    Procedure: CATHETERIZATION, HEART, BOTH LEFT AND RIGHT;  Surgeon: Michelle Holman MD;  Location: [facility] CATH LAB;  Service: Cardiology;  Laterality: N/A;    CORONARY ARTERY BYPASS GRAFT      CORONARY ARTERY BYPASS GRAFT (CABG) N/A 7/20/2022    Procedure: CORONARY ARTERY BYPASS GRAFT (CABG);  Surgeon: Sanju Locke MD;  Location: [facility] OR;  Service: Cardiothoracic;  Laterality: N/A;  2-VESSEL PUMP ASSIST WITH EPI-AORTIC ULTRASOUND    ENDOSCOPIC HARVEST OF VEIN Left 7/20/2022    Procedure: SURGICAL PROCUREMENT, VEIN, ENDOSCOPIC;  Surgeon: Sanju Locke MD;  Location: [facility] OR;  Service: Cardiothoracic;  Laterality: Left;    HYSTERECTOMY  1982    INJECTION OF ANESTHETIC AGENT AROUND MULTIPLE INTERCOSTAL NERVES N/A 7/20/2022    Procedure: BLOCK, NERVE, INTERCOSTAL, 2 OR MORE;  Surgeon: Sanju Locke MD;  Location: [facility] OR;  Service: Cardiothoracic;  Laterality: N/A;  PARASTERNAL NERVE BLOCK    INSERTION OF SHUNT      LEFT HEART CATHETERIZATION Left 12/3/2018    Procedure: CATHETERIZATION, HEART, LEFT;  Surgeon: Michelle Holman MD;  Location: [facility] CATH LAB;  Service: Cardiology;  Laterality: Left;  LAD ATHERECTOMY STENT/ FEMORAL APPROACH    LEFT HEART CATHETERIZATION Left 7/13/2022    Procedure: CATHETERIZATION, HEART, LEFT;  Surgeon: Abhi Sloan MD;  Location: [facility] CATH LAB;  Service: Cardiology;  Laterality: Left;    OOPHORECTOMY      wrist cyst Right 1980s    dorsal wrist cyst       Time Tracking:     OT Date of Treatment: 08/10/23  OT Start Time: 0905  OT Stop Time: 0930  OT Total Time (min): 25 min    Billable Minutes: Evaluation 15 minutes and Therapeutic Activity 10 minutes    8/10/2023

## 2023-08-10 NOTE — PLAN OF CARE
Problem: Device-Related Complication Risk (Hemodialysis)  Goal: Safe, Effective Therapy Delivery  Outcome: Ongoing, Progressing     Problem: Hemodynamic Instability (Hemodialysis)  Goal: Effective Tissue Perfusion  Outcome: Ongoing, Progressing     Problem: Infection (Hemodialysis)  Goal: Absence of Infection Signs and Symptoms  Outcome: Ongoing, Progressing     Problem: Adult Inpatient Plan of Care  Goal: Plan of Care Review  Outcome: Ongoing, Progressing  Goal: Patient-Specific Goal (Individualized)  Outcome: Ongoing, Progressing  Flowsheets (Taken 8/10/2023 0306)  Anxieties, Fears or Concerns: food  Individualized Care Needs: blankets  Goal: Absence of Hospital-Acquired Illness or Injury  Outcome: Ongoing, Progressing  Goal: Optimal Comfort and Wellbeing  Outcome: Ongoing, Progressing  Goal: Readiness for Transition of Care  Outcome: Ongoing, Progressing     Problem: Diabetes Comorbidity  Goal: Blood Glucose Level Within Targeted Range  Outcome: Ongoing, Progressing     Problem: Skin Injury Risk Increased  Goal: Skin Health and Integrity  Outcome: Ongoing, Progressing      Discussed POC with pt and daughter, verbalized understanding.  Patient remains free from injury. Safety precautions maintained. No s/s of acute distress.  Purposeful rounding every two hours.   Pain controlled per MD order.   Cardiac monitoring in place, tele box number [id number]  Blood glucose monitoring continued this shift.   Diet orders continued, pt diet: diabetic  Vital signs continued per orders this shift.   Chart and orders review completed. Pt education about care completed.

## 2023-08-10 NOTE — PT/OT/SLP EVAL
Physical Therapy Evaluation    Patient Name:  Avril Monzon   MRN:  [id number]    Recommendations:     Discharge Recommendations: outpatient PT   Discharge Equipment Recommendations: grab bar   Barriers to discharge: None    Assessment:     Avril Monzon is a 78 y.o. female admitted with a medical diagnosis of Hyperkalemia.  She presents with the following impairments/functional limitations: weakness, impaired endurance, gait instability, impaired balance, impaired functional mobility .    Rehab Prognosis: Good; patient would benefit from acute skilled PT services to address these deficits and reach maximum level of function.    Recent Surgery: * No surgery found *      Plan:     During this hospitalization, patient to be seen 3 x/week to address the identified rehab impairments via gait training, therapeutic activities, therapeutic exercises and progress toward the following goals:    Plan of Care Expires:  08/24/23    Subjective     Chief Complaint: NONE   Patient/Family Comments/goals: GO TO OUT PT P.T. TO GET STRONGER  Pain/Comfort:  Pain Rating 1: 0/10  Pain Rating Post-Intervention 1: 0/10    Patients cultural, spiritual, Alevism conflicts given the current situation:      Living Environment:  PT LIVES AT HOME WITH DAUGHTER IN A ONE STORY HOME WITH NO STEPS   Prior to admission, patients level of function was IND AND DOESN'T DRIVE .  Equipment used at home: shower chair.  DME owned (not currently used): wheelchair.  Upon discharge, patient will have assistance from DAUGHTER .    Objective:     Communicated with NURSE POWELL AND [facility] CHART REVIEW  prior to session.  Patient found supine with peripheral IV, telemetry  upon PT entry to room.    General Precautions: Standard, fall  Orthopedic Precautions:N/A   Braces: N/A  Respiratory Status: Room air    Exams:  Cognitive Exam:  Patient is oriented to Person, Place, Time, and Situation  RLE ROM: WFL  RLE Strength: WFL  LLE ROM: WFL  LLE Strength:  "WFL    Functional Mobility:  Bed Mobility:     Rolling Left:  stand by assistance  Supine to Sit: stand by assistance  Transfers:     Sit to Stand:  stand by assistance with no AD  Bed to Chair: stand by assistance with  no AD  using  Step Transfer  Gait: PT GT TRAINED X 150' WITH NO AD AND 1 LOB REQUIRING MIN>CGA TO RIGHT SELF.       AM-PAC 6 CLICK MOBILITY  Total Score:19       Treatment & Education:  PT RETURNED TO RM T/F TO CHAIR FOR REST. PT COMPLETE B LE TE X 10 REPS OF AP, TKE, AND MIP.   Pt educated on the following: Pt verbally agreed in understanding.   Role of P.T. in the acute care setting.  Sit in chair throughout the day to increase functional strength and activity tolerance and decrease risk of blood clots and secondary infection.   "Call, don't fall" procedure of pressing red button on call bell for all transfers.      Patient left up in chair with call button in reach.    GOALS:   Multidisciplinary Problems       Physical Therapy Goals          Problem: Physical Therapy    Goal Priority Disciplines Outcome Goal Variances Interventions   Physical Therapy Goal     PT, PT/OT      Description: LT23  1. PT WILL COMPLETE BED MOBILITY IND TO PROGRESS BED MOBILITY  2. PT WILL STAND STEP T/F TO CHAIR IND TO PROGRESS OOB TOLERANCE.  3. PT WILL GT TRAIN WITH LRAD X 300' WITH S  4. PT WILL INC AMPAC SCORE BY 2 POINTS TO PROGRESS GROSS FUNC MOBILITY.                          History:     Past Medical History:   Diagnosis Date    Acute hypoxemic respiratory failure 2018    Anemia     Arthritis     back, hand, knees    Back pain     Cataract     CKD stage G4/A3, GFR 15 - 29 and albumin creatinine ratio >300 mg/g     GFR 40% 2014 and 33% ub 3/2014 (BRG)    Coronary artery disease involving native coronary artery of native heart 2018    Diabetic retinopathy     DM (diabetes mellitus) type II controlled, neurological manifestation     Exudative age-related macular degeneration of left eye with " active choroidal neovascularization 2/5/2019    GERD (gastroesophageal reflux disease)     Glaucoma     Heart failure     Hyperlipidemia     Hypertension     Non-ST elevation MI (NSTEMI) 11/28/2018    NSTEMI (non-ST elevated myocardial infarction) 11/27/2018    Peripheral neuropathy     Polyneuropathy     Proteinuria     >6 mo     Seizures     BRG 1/2014 -dick CT NRI showed small vessel    Skin ulcer of toe of right foot with fat layer exposed 10/14/2022    Stroke 2012,2014    Tobacco dependence     Type 2 diabetes with peripheral circulatory disorder, controlled        Past Surgical History:   Procedure Laterality Date    BREAST LUMPECTOMY Left     benign, when patient was 15 years old    BREAST MASS EXCISION      ,benign, age 15.    CATHETERIZATION OF BOTH LEFT AND RIGHT HEART N/A 11/28/2018    Procedure: CATHETERIZATION, HEART, BOTH LEFT AND RIGHT;  Surgeon: Michelle Holman MD;  Location: [facility] CATH LAB;  Service: Cardiology;  Laterality: N/A;    CATHETERIZATION OF BOTH LEFT AND RIGHT HEART N/A 11/30/2018    Procedure: CATHETERIZATION, HEART, BOTH LEFT AND RIGHT;  Surgeon: Mcihelle Holman MD;  Location: [facility] CATH LAB;  Service: Cardiology;  Laterality: N/A;    CORONARY ARTERY BYPASS GRAFT      CORONARY ARTERY BYPASS GRAFT (CABG) N/A 7/20/2022    Procedure: CORONARY ARTERY BYPASS GRAFT (CABG);  Surgeon: Sanju Locke MD;  Location: [facility] OR;  Service: Cardiothoracic;  Laterality: N/A;  2-VESSEL PUMP ASSIST WITH EPI-AORTIC ULTRASOUND    ENDOSCOPIC HARVEST OF VEIN Left 7/20/2022    Procedure: SURGICAL PROCUREMENT, VEIN, ENDOSCOPIC;  Surgeon: Sanju Locke MD;  Location: [facility] OR;  Service: Cardiothoracic;  Laterality: Left;    HYSTERECTOMY  1982    INJECTION OF ANESTHETIC AGENT AROUND MULTIPLE INTERCOSTAL NERVES N/A 7/20/2022    Procedure: BLOCK, NERVE, INTERCOSTAL, 2 OR MORE;  Surgeon: Sanju Locke MD;  Location: [facility] OR;  Service: Cardiothoracic;  Laterality: N/A;  PARASTERNAL NERVE BLOCK    INSERTION OF  SHUNT      LEFT HEART CATHETERIZATION Left 12/3/2018    Procedure: CATHETERIZATION, HEART, LEFT;  Surgeon: Michelle Holman MD;  Location: [facility] CATH LAB;  Service: Cardiology;  Laterality: Left;  LAD ATHERECTOMY STENT/ FEMORAL APPROACH    LEFT HEART CATHETERIZATION Left 7/13/2022    Procedure: CATHETERIZATION, HEART, LEFT;  Surgeon: Abhi Sloan MD;  Location: [facility] CATH LAB;  Service: Cardiology;  Laterality: Left;    OOPHORECTOMY      wrist cyst Right 1980s    dorsal wrist cyst       Time Tracking:     PT Received On: 08/10/23  PT Start Time: 0853     PT Stop Time: 0920  PT Total Time (min): 27 min     Billable Minutes: Evaluation 17 and Therapeutic Exercise 10      08/10/2023

## 2023-08-10 NOTE — CONSULTS
"   Food & Nutrition Education     Diet Education: Renal diet    Time Spent: 20 minutes.    Learners: Patient    Nutrition Education provided with handouts:  Low-Sodium Nutrition Therapy, Potassium Content  of Foods, Phosphorus Content of Foods, Chronic Kidney Disease  Stage 3-5 Nutrition Therapy ([email or web address])    Comments:  Educated patient on general healthful dietary patterns r/t hospital diagnosis. Discussed the importance of limiting sodium to less than 2,000 mg per day and reading food labels to avoid further health complications. Discussed using salt free seasonings (Mrs. Zhen and [employer]ac[employer] "No Salt") and other herbs and spices in meals to enhance flavor without additional sodium. Discussed various food sources of K+ and Phos and the appropriate portion for low consumption (<200mg). Discussed reading food packages, food labels, and nutrition facts labels to identify K+/Phos nutrient content of foods.   Pt states she eats away from home most days however, she attempts to frequent restaurants which do not uses excess amounts of sodiums within their meals. Discussed the importance of preparing meals at home instead of consuming them out so she is able to accurately account for all nutrients within meals. RD will continue to follow.     NFPE not performed, pt appears well nourished.  All questions and concerns answered.  Provided handout with dietitian's contact information.  Please re-consult as needed.  Thank You!  Rian Leonardo, Registration Eligible, Provisional LDN    "

## 2023-08-10 NOTE — DISCHARGE SUMMARY
[facility] [place] Medicine  Discharge Summary      Patient Name: Avril Monzon  MRN: [id number]  TK: [id number]  Patient Class: OP- Observation  Admission Date: 8/9/2023  Hospital Length of Stay: 0 days  Discharge Date and Time:  08/10/2023 11:58 AM  Attending Physician: Suhas Haines,*   Discharging Provider: Suhas Prakash MD  Primary Care Provider: Rose Germain MD    Primary Care Team: Networked reference to record PCT     HPI:   78 y.o. female patient with a PMHx of acute hypoxemic respiratory failure, heart failure, ESRD on dialysis M/W/F, CAD s/p CABG, DM2, HLD, HTN, seizures, and CVA who presents to the Emergency Department for SOB which onset 5 days ago. Pt reports that she missed her last 2 dialysis appointments, on 8/4/23 and 8/7/23, because she was short of breath. Pt reports that she dialyzes at [facility] and is unsure of who her Nephrologist is. Symptoms are constant and moderate in severity. No mitigating or exacerbating factors reported. No associated sxs reported. Patient denies any CP, fever, chills, N/V/D, weakness, and all other sxs at this time. No prior Tx reported.Of note, patient presented to PCP on 8/3/23 with c/o weakness, foul odor to urine. UA ordered, was not collected. Reports continued foul odor. In the ED, Labs: Hgb: 9.4, K+: 6.7, BUN: 80, Cr: 11.1, BNP: >4900, trop: 0.258, CXR: Cardiomegaly with developing bilateral pleural effusions and suspected pulmonary venous hypertension. Nephrology consulted, HD performed in ED. Continued weakness/fatigue. Patient is a full code, surrogate decision maker is her daughter, Elizabeth Monzon. Placed in Observation under the care of Hospital Medicine for management of Hyperkalemia, Fatigue.        * No surgery found *      Hospital Course:   79 y/o AAF admitted with Dx of hyperkalemia and weakness . She missed HD   x 1 . She is s/p HD yesterday with and improvement of her sx .  Nephrology consulted and rec to cont HD as  schedule outpt  The K level is back to normal . The  troponin level are at baseline ( chronic elevated ). Pt was seen and examined at bedside . She was determined to be  suitable for d/c .       Goals of Care Treatment Preferences:  Code Status: Full Code    Health care agent: Medical decision maker is her daughter Elizabeth Monzon [phone].  Health care agent number: No value filed.    Living Will: Yes     What is most important right now is to focus on improvement in condition but with limits to invasive therapies.  Accordingly, we have decided that the best plan to meet the patient's goals includes continuing with treatment.      Consults:   Consults (From admission, onward)        Status Ordering Provider     Inpatient consult to Registered Dietitian/Nutritionist  Once        Provider:  (Not yet assigned)    Acknowledged RIGOBERTO PARKER     Inpatient consult to Nephrology  Once        Provider:  Adrian Ramirez MD    Completed MAHOGANY FRIEDMAN          Cardiac/Vascular  Chronic combined systolic and diastolic heart failure  Euvolemic on exam, Lungs clear, Per review of CXR: Cardiomegaly with developing bilateral pleural effusions and suspected pulmonary venous hypertension. CXR prior to HD in ED.     --Continue home medications      Renal/  * Hyperkalemia  Secondary to non-compliance with HD, last HD session was 23, scheduled for ,,    --HD completed in ED  --Repeat Labs in AM      Dysuria    Presented to PCP on 8/3/23 with c/o foul smelling urine, patient unable to provide urine sample. Reports continued foul odor, denies fever. SInce patient had HD in ED, may not be able to produce urine sample until the AM    --UA reflex to culture    Metabolic acidosis  Secondary to non-compliance with HD, A, CO2: 18, expect with improve post HD    --HD completed in ED  --Repeat CMP in AM      ESRD (end stage renal disease)  HD on ,,, missed Monday and Wednesday    --HD in ED per Nephrology,  removed 2L  --Repeat labs in AM      Oncology  Anemia in chronic kidney disease, on chronic dialysis  Chronic, stable, baseline Hgb: 9.0    --Daily CBC  --Transfuse with 1 unit PRBC for Hgb <7.0 or <8.0 if symptomatic       Endocrine  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, with long-term current use of insulin  Patient's FSGs are controlled on current medication regimen.  Last A1c reviewed-   Lab Results   Component Value Date    HGBA1C 6.6 (H) 04/04/2023     Most recent fingerstick glucose reviewed-   Recent Labs   Lab 08/09/23  1429 08/09/23  1956 08/10/23  0532   POCTGLUCOSE 211* 105 152*     Current correctional scale  Medium  Maintain anti-hyperglycemic dose as follows-   Antihyperglycemics (From admission, onward)    Start     Stop Route Frequency Ordered    08/09/23 1834  insulin aspart U-100 pen 1-10 Units         -- SubQ Before meals & nightly PRN 08/09/23 1735        Hold Oral hypoglycemics while patient is in the hospital.    Other  Debility  Reported worsening fatigue, weakness over the past 1-2 weeks.     --PT/OT        Final Active Diagnoses:    Diagnosis Date Noted POA    PRINCIPAL PROBLEM:  Hyperkalemia [E87.5] 07/12/2022 Yes    Dysuria [R30.0] 08/09/2023 Yes    Debility [R53.81] 08/09/2023 Yes    Metabolic acidosis [E87.20] 07/12/2022 Yes    Chronic combined systolic and diastolic heart failure [I50.42] 12/20/2021 Yes    Anemia in chronic kidney disease, on chronic dialysis [N18.6, D63.1, Z99.2] 05/18/2017 Not Applicable    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, with long-term current use of insulin [E11.3553, Z79.4] 05/02/2016 Not Applicable    ESRD (end stage renal disease) [N18.6]  Yes      Problems Resolved During this Admission:       Discharged Condition: stable    Disposition: Home or Self Care    Follow Up:   Follow-up Information     Rose Germain MD Follow up in 1 week(s).    Specialty: Internal  Medicine  Contact information:  [address] [place] [address]  [phone]                       Patient Instructions:      Ambulatory referral/consult to Outpatient Case Management   Referral Priority: Routine Referral Type: Consultation   Referral Reason: Specialty Services Required   Number of Visits Requested: 1     Diet renal       Significant Diagnostic Studies: Labs:   BMP:   Recent Labs   Lab 08/09/23  1227 08/10/23  0639   * 138*    137   K 6.7* 4.6    99   CO2 18* 22*   BUN 80* 51*   CREATININE 11.1* 8.4*   CALCIUM 10.2 9.4   MG 3.4* 2.7*    and CMP   Recent Labs   Lab 08/09/23  1227 08/10/23  0639    137   K 6.7* 4.6    99   CO2 18* 22*   * 138*   BUN 80* 51*   CREATININE 11.1* 8.4*   CALCIUM 10.2 9.4   PROT 7.6 6.6   ALBUMIN 3.5 3.0*   BILITOT 0.3 0.4   ALKPHOS 76 67   AST 13 9*   ALT 10 5*   ANIONGAP 18* 16     Microbiology:   Blood Culture   Lab Results   Component Value Date    LABBLOO No growth after 5 days. 11/26/2018    LABBLOO No growth after 5 days. 11/26/2018       Pending Diagnostic Studies:     Procedure Component Value Units Date/Time    Hepatitis B Surface Antibody, Qual/Quant [[id number]] Collected: 08/09/23 1403    Order Status: Sent Lab Status: In process Updated: 08/09/23 2009    Specimen: Blood          Medications:  Reconciled Home Medications:      Medication List      CONTINUE taking these medications    ammonium lactate 12 % Crea  Apply 1 application topically 2 (two) times daily. For calluses     aspirin 81 MG EC tablet  Commonly known as: ECOTRIN  Take 1 tablet (81 mg total) by mouth once daily.     atropine 1% 1 % Drop  Commonly known as: ISOPTO ATROPINE  Place 1 drop into the left eye 3 (three) times daily.     azelastine 137 mcg (0.1 %) nasal spray  Commonly known as: ASTELIN  1 spray (137 mcg total) by Nasal route 2 (two) times daily.     blood sugar diagnostic Strp  Commonly known as: TRUE METRIX GLUCOSE TEST STRIP  1  "strip by Misc.(Non-Drug; Combo Route) route 3 (three) times daily.     cyanocobalamin 1000 MCG tablet  Commonly known as: VITAMIN B-12  Take 100 mcg by mouth once daily.     fluocinonide 0.05 % external solution  Commonly known as: LIDEX  Apply topically 2 (two) times daily. Use on scalp     folic acid 1 MG tablet  Commonly known as: FOLVITE  Take 1 mg by mouth once daily.     insulin aspart protamine-insulin aspart 100 unit/mL (70-30) Inpn pen  Commonly known as: NovoLOG Mix 70-30FlexPen U-100  Inject 20 units into skin before breakfast, 8 units before dinner. On dialysis days, give 10 units before dinner.     loratadine 10 mg tablet  Commonly known as: CLARITIN  Take 1 tablet (10 mg total) by mouth once daily.     metoprolol tartrate 50 MG tablet  Commonly known as: LOPRESSOR  Take 1 tablet (50 mg total) by mouth 2 (two) times daily.     mupirocin 2 % ointment  Commonly known as: BACTROBAN  Apply topically 2 (two) times daily.     ondansetron 4 MG Tbdl  Commonly known as: ZOFRAN-ODT  Take 1 tablet (4 mg total) by mouth every 8 (eight) hours as needed (nausea/vomiting).     pen needle, diabetic 32 gauge x 5/32" Ndle  1 each by Misc.(Non-Drug; Combo Route) route 2 (two) times a day.     rosuvastatin 20 MG tablet  Commonly known as: CRESTOR  Take 1 tablet (20 mg total) by mouth once daily.     sevelamer carbonate 800 mg Tab  Commonly known as: RENVELA  Take 3 tablets (2,400 mg) by mouth three times a day with meals and 2 tablets (1,600 mg) by mouth with snacks     TRUE METRIX GLUCOSE METER Misc  Generic drug: blood-glucose meter  Use as instructed     TRUEPLUS LANCETS 33 gauge Misc  Generic drug: lancets  1 lancet by Misc.(Non-Drug; Combo Route) route 3 (three) times daily.            Indwelling Lines/Drains at time of discharge:   Lines/Drains/Airways     Drain  Duration                Hemodialysis AV Fistula 04/01/20 2203 Left upper arm 1225 days    Female External Urinary Catheter 08/09/23 1944 <1 day          "       Time spent on the discharge of patient: 30 minutes         Suhas Prakash MD  Department of Hospital Medicine  [facility]

## 2023-08-11 ENCOUNTER — TELEPHONE (OUTPATIENT)
Dept: PRIMARY CARE CLINIC | Facility: CLINIC | Age: 78
End: 2023-08-11
Payer: MEDICARE

## 2023-08-12 LAB
HBV SURFACE AB SER QL IA: POSITIVE
HBV SURFACE AB SERPL IA-ACNC: 520 MIU/ML

## 2023-08-14 ENCOUNTER — OUTPATIENT CASE MANAGEMENT (OUTPATIENT)
Dept: ADMINISTRATIVE | Facility: OTHER | Age: 78
End: 2023-08-14
Payer: MEDICARE

## 2023-08-15 ENCOUNTER — OUTPATIENT CASE MANAGEMENT (OUTPATIENT)
Dept: ADMINISTRATIVE | Facility: OTHER | Age: 78
End: 2023-08-15
Payer: MEDICARE

## 2023-08-15 ENCOUNTER — OFFICE VISIT (OUTPATIENT)
Dept: DIABETES | Facility: CLINIC | Age: 78
End: 2023-08-15
Payer: MEDICARE

## 2023-08-15 ENCOUNTER — TELEPHONE (OUTPATIENT)
Dept: DIABETES | Facility: CLINIC | Age: 78
End: 2023-08-15
Payer: MEDICARE

## 2023-08-15 ENCOUNTER — TELEPHONE (OUTPATIENT)
Dept: DIABETES | Facility: CLINIC | Age: 78
End: 2023-08-15

## 2023-08-15 DIAGNOSIS — N18.6 ESRD (END STAGE RENAL DISEASE): ICD-10-CM

## 2023-08-15 DIAGNOSIS — E11.69 HYPERLIPIDEMIA ASSOCIATED WITH TYPE 2 DIABETES MELLITUS: ICD-10-CM

## 2023-08-15 DIAGNOSIS — I15.2 HYPERTENSION ASSOCIATED WITH DIABETES: ICD-10-CM

## 2023-08-15 DIAGNOSIS — E78.5 HYPERLIPIDEMIA ASSOCIATED WITH TYPE 2 DIABETES MELLITUS: ICD-10-CM

## 2023-08-15 DIAGNOSIS — E11.59 HYPERTENSION ASSOCIATED WITH DIABETES: ICD-10-CM

## 2023-08-15 DIAGNOSIS — E11.3553 TYPE 2 DIABETES MELLITUS WITH STABLE PROLIFERATIVE RETINOPATHY OF BOTH EYES, WITH LONG-TERM CURRENT USE OF INSULIN: Primary | ICD-10-CM

## 2023-08-15 DIAGNOSIS — Z79.4 TYPE 2 DIABETES MELLITUS WITH STABLE PROLIFERATIVE RETINOPATHY OF BOTH EYES, WITH LONG-TERM CURRENT USE OF INSULIN: Primary | ICD-10-CM

## 2023-08-15 PROCEDURE — 99442 PR PHYSICIAN TELEPHONE EVALUATION 11-20 MIN: CPT | Mod: HCNC,95,, | Performed by: NURSE PRACTITIONER

## 2023-08-15 PROCEDURE — 1157F ADVNC CARE PLAN IN RCRD: CPT | Mod: 95,HCNC,CPTII, | Performed by: NURSE PRACTITIONER

## 2023-08-15 PROCEDURE — 1157F PR ADVANCE CARE PLAN OR EQUIV PRESENT IN MEDICAL RECORD: ICD-10-PCS | Mod: 95,HCNC,CPTII, | Performed by: NURSE PRACTITIONER

## 2023-08-15 PROCEDURE — 1159F PR MEDICATION LIST DOCUMENTED IN MEDICAL RECORD: ICD-10-PCS | Mod: 95,HCNC,CPTII, | Performed by: NURSE PRACTITIONER

## 2023-08-15 PROCEDURE — 1160F PR REVIEW ALL MEDS BY PRESCRIBER/CLIN PHARMACIST DOCUMENTED: ICD-10-PCS | Mod: 95,HCNC,CPTII, | Performed by: NURSE PRACTITIONER

## 2023-08-15 PROCEDURE — 1160F RVW MEDS BY RX/DR IN RCRD: CPT | Mod: 95,HCNC,CPTII, | Performed by: NURSE PRACTITIONER

## 2023-08-15 PROCEDURE — 1159F MED LIST DOCD IN RCRD: CPT | Mod: 95,HCNC,CPTII, | Performed by: NURSE PRACTITIONER

## 2023-08-15 PROCEDURE — 99442 PR PHYSICIAN TELEPHONE EVALUATION 11-20 MIN: ICD-10-PCS | Mod: HCNC,95,, | Performed by: NURSE PRACTITIONER

## 2023-08-15 RX ORDER — INSULIN ASPART 100 [IU]/ML
INJECTION, SUSPENSION SUBCUTANEOUS
Qty: 15 ML | Refills: 3
Start: 2023-08-15 | End: 2024-01-30

## 2023-08-15 NOTE — PROGRESS NOTES
Outpatient Care Management  Initial Patient Assessment    Patient: Avril Monzon  MRN: [id number]  Date of Service: 08/15/2023  Completed by: Debbie Lopez RN  Referral Date: 08/10/2023  Program: High Risk  Status: Identified  Start Date: 8/11/2023  Responsible Staff: No information to display        No chief complaint on file.      Brief Summary:  Avril Monzon was referred by Dr LAURIE Prakash for hyperkalemia. Patient qualifies for program based on risk score of 88 %.   Active problem list, medical, surgical and social history reviewed. Active comorbidities include hx of CVA with hemiplegia, DDD, R eye legal blindness, L eye macular degeneration with recent cataract extraction, pulmonary HTN, CHF, CAD with hx of CABG X 5, ESRD on hemodialysis, T2DM with current A1c of 6.6 and debility. Areas of need identified by patient include assistance with resuming use of a CGM if she qualifies. She agrees to have educational literature mailed about ESRD as well to review importance of consistent attendance of HD appts.   Next steps: Mail above indicated educational literature and follow up in about 10 days to begin to discuss measures for better management of DM and ESRD. Debbie Lopez RN    Disability Status  Is the patient alert and oriented (person, place, time, and situation)?: Alert and oriented x 4  Hearing Difficulty or Deaf: no  Visual Difficulty or Blind: yes  Visual and Hearing Needs Conclusion: -- (Has R eye blindness and recently had L eye cataract extraction which has greatly improved her vision.)  Vision Management: -- (Will follow up with eye doctor following recent L eye cataract extraction and has been told she will need glasses.)  Difficulty Concentrating, Remembering or Making Decisions: no  Communication Difficulty: no  Eating/Swallowing Difficulty: no  Walking or Climbing Stairs Difficulty: no  Dressing/Bathing Difficulty: no  Toileting : Independent  Continence : Continence - Not a problem  Difficulty  Managing Errands Independently: yes (She is unable to drive.)  Errands Management: -- (She is able to more errands like grocery shopping now that her vision is improved.Elizabeth does all driving and most errands.)  Equipment Currently Used at Home: blood pressure machine; glucometer  ADL Conclusion Statement: -- (She reports she is independent with most ADL's and her daughter, Elizabeth, assists as needed.)  Change in Functional Status Since Onset of Current Illness/Injury: yes (Has improving vision daily per her report following recent L eye cataract extraction.)        Spiritual Beliefs  Spiritual, Cultural Beliefs, Moravian Practices, Values that Affect Care: no      Social History     Socioeconomic History    Marital status:    Tobacco Use    Smoking status: Former     Current packs/day: 0.00     Average packs/day: 1.5 packs/day for 30.0 years (45.0 ttl pk-yrs)     Types: Cigarettes     Start date: 1960     Quit date: 1990     Years since quittin.6    Smokeless tobacco: Former   Substance and Sexual Activity    Alcohol use: No     Alcohol/week: 0.0 standard drinks of alcohol    Drug use: No    Sexual activity: Not Currently     Social Determinants of Health     Financial Resource Strain: Low Risk  (2023)    Overall Financial Resource Strain (CARDIA)     Difficulty of Paying Living Expenses: Not very hard   Food Insecurity: No Food Insecurity (2023)    Hunger Vital Sign     Worried About Running Out of Food in the Last Year: Never true     Ran Out of Food in the Last Year: Never true   Transportation Needs: Unmet Transportation Needs (2023)    PRAPARE - Transportation     Lack of Transportation (Medical): Yes     Lack of Transportation (Non-Medical): No   Physical Activity: Inactive (2023)    Exercise Vital Sign     Days of Exercise per Week: 0 days     Minutes of Exercise per Session: 0 min   Stress: Stress Concern Present (2023)    [place] [employer] - Occupational Stress Questionnaire     Feeling of Stress : To some extent   Social Connections: Moderately Integrated (1/24/2023)    Social Connection and Isolation Panel [NHANES]     Frequency of Communication with Friends and Family: More than three times a week     Frequency of Social Gatherings with Friends and Family: More than three times a week     Attends Moravian Services: More than 4 times per year     Active Member of Clubs or Organizations: No     Attends Club or Organization Meetings: Never     Marital Status:    Housing Stability: Low Risk  (1/24/2023)    Housing Stability Vital Sign     Unable to Pay for Housing in the Last Year: No     Number of Places Lived in the Last Year: 1     Unstable Housing in the Last Year: No       Roles and Relationships  Primary Source of Support/Comfort: child(robert)  Name of Support/Comfort Primary Source: -- (Elizabeth)  Secondary Source of Support/Comfort: sibling(s)  Name of Support/Comfort Secondary Source: -- (Myriam)      Advance Directives (For Healthcare)  Advance Directive  (If Adv Dir status is received, view document under Adv Dir in header or Chart Review Media tab): Advance Directive currently in Epic.        Patient Reported Insurance  Verified current insurance plan:: [employer] Advantage; Medicaid        Depression Patient Health Questionnaire 8/15/2023 1/24/2023 1/24/2023 1/17/2023 8/30/2022 8/25/2022 4/12/2022   Over the last two weeks how often have you been bothered by little interest or pleasure in doing things Not at all Not at all Not at all Not at all Not at all Not at all Not at all   Over the last two weeks how often have you been bothered by feeling down, depressed or hopeless Not at all Not at all Not at all Not at all Not at all Not at all Not at all   PHQ-2 Total Score 0 0 0 0 0 0 0       Learning Assessment       08/15/2023 1206 [facility] (8/15/2023 - Present)   Created by Debbie Lopez RN - RN (Nurse)  Status: Complete                 PRIMARY LEARNER     Primary Learner Name:  Avril  - 08/15/2023 1206    Relationship:  Patient  - 08/15/2023 1206    Does the primary learner have any barriers to learning?:  Visual  - 08/15/2023 1206    What is the preferred language of the primary learner?:  English  - 08/15/2023 1206    Is an  required?:  No  - 08/15/2023 1206    How does the primary learner prefer to learn new concepts?:  Listening  - 08/15/2023 1206    How often do you need to have someone help you read instructions, pamphlets, or written material from your doctor or pharmacy?:  Sometimes [facility] 08/15/2023 1206        CO-LEARNER #1     No question answered        CO-LEARNER #2     No question answered        SPECIAL TOPICS     No question answered        ANSWERED BY:     No question answered        Edit History       Debbie Lopez, RN - RN (Nurse)   08/15/2023 1206

## 2023-08-15 NOTE — LETTER
Avril Monzon  [address] [address]    Dear Avril Monzon,     Welcome to [facility] Outpatient Care Management Program. We are here to assist patients with multiple long-term (chronic) conditions who often need more personalized healthcare.    It was a pleasure talking with you today. My name is Debbie Lopez RN. I look forward to working with you as your Care Manager. I will be contacting you by telephone routinely to help coordinate care and resolve issues.    My goal is to help you function at the healthiest and highest level possible. You can contact me directly at [phone].    As an [facility] patient with [employer], some of the services we provide, at no cost to you, include:     Development of an individualized care plan with a Registered Nurse   Connection with a   Assistance from a Community Health Worker  Connection with available resources and services    Coordinate communication among your care team members   Provide coaching and education   Help you understand your doctors treatment plan  Help you obtain information about your insurance coverage.     All services provided by [facility]s Outpatient Care Managers and other care team members are coordinated with and communicated to your primary care team.      As part of your enrollment, you will be receiving education materials and more information about these services in your [facility] account, by phone, or through the mail. If you do not wish to participate or receive information, you can Opt Out by contacting our office at [phone].      Sincerely,        Debbie Lopez RN  [facility]   Outpatient Care Management

## 2023-08-15 NOTE — TELEPHONE ENCOUNTER
----- Message from Reg Alvarez sent at 8/15/2023 11:40 AM CDT -----  Contact: Daughter  Type: Needs Medical Advice    Who Called:Pt Daughter  Best Call Back Number:[phone]  Additional Information Requesting a call back regarding Pt daughter was calling to speak with nurse daughter stated they missed the call for the Audio Visit wanted to see if office could call back when available Thank you  Please Advise-Thank you

## 2023-08-15 NOTE — TELEPHONE ENCOUNTER
Called pt. Pt's daughter picked up and stated that Natty Delacruz NP called her around 11:36 for her visit but she did not  in time. Natty said that she would call the pt back soon.

## 2023-08-15 NOTE — PROGRESS NOTES
Established Patient - Audio Only Telehealth Visit     The patient location is: Home ([place])  The chief complaint leading to consultation is: Diabetes Follow-up  Visit type: Virtual visit with audio only (telephone)  Total time spent with patient: 14 minutes    The reason for the audio only service rather than synchronous audio and video virtual visit was related to technical difficulties or patient preference/necessity.     Each patient to whom I provide medical services by telemedicine is:  (1) informed of the relationship between the physician and patient and the respective role of any other health care provider with respect to management of the patient; and (2) notified that they may decline to receive medical services by telemedicine and may withdraw from such care at any time. Patient verbally consented to receive this service via voice-only telephone call.    This service was not originating from a related E/M service provided within the previous 7 days nor will  to an E/M service or procedure within the next 24 hours or my soonest available appointment.  Prevailing standard of care was able to be met in this audio-only visit.     PCP: Rose Germain MD    Subjective:     Chief Complaint: Diabetes follow up.  Last visit (audio) with me: 4/13/23    HPI: 78 y.o.  female for diabetes follow up.   Previously managed by JACK Pierce.   Last clinic visit 1/2016.  Type II diabetes since age 44.  Comorbidities: HTN, HLD, CHF, ESRD, CVA, MI, Dialysis Use - Sched MWF 10-3 PM, Legally blind.  s/p CABG 07/2022  ESRD on dialysis x 2 years  Daughter Elizabeth also on the phone, primary caregiver.    Family History of Type 1/2 DM: mother  Known diabetes complications:  DKA/HHS: no  Retinopathy: yes  Peripheral neuropathy: yes  Nephropathy: yes    Interval history:  Admits recent hospital admission for hyperkalemia.  Denies any hypoglycemia or any symptoms.   Endorses diabetes related symptoms:  "Tingling in Lower Extremities and Both fingers    Blood glucose monitoring: via fingerstick, 2-4x/day  Per patient's daughter recall, over the last 4 weeks   Fasting  average 150-160    Postprandial dinner 200-300's    No BG log for review.    Activity level: Sedentary  Meal Plannin meals/day; infrequent snacks/day.  On dialysis days, one main meal.   Eating snack during dialysis and early dinner when she gets home.    Medication compliance all of the time, diet compliance most of the time.   To be enrolled in diabetes education classes.     Previous treatment: Novolin 70/30, Ozempic, Lantus    Past failed treatment:  None    Current DM Medications:  Diabetes Medications               insulin aspart protamine-insulin aspart (NOVOLOG MIX 70-30FLEXPEN U-100) 100 unit/mL (70-30) InPn pen Inject 20 units into skin before breakfast, 8 units before dinner. On dialysis days, give 10 units before dinner.       Allergies  Review of patient's allergies indicates:   Allergen Reactions    Codeine Swelling    Darvon [propoxyphene] Swelling    Atorvastatin      Muscle twitching       Labs Reviewed:  Her most recent A1C is:  Lab Results   Component Value Date    HGBA1C 6.6 (H) 2023    HGBA1C 5.3 2022    HGBA1C 9.3 (H) 2022       Her most recent BMP is:  Lab Results   Component Value Date     08/10/2023    K 4.6 08/10/2023    CL 99 08/10/2023    CO2 22 (L) 08/10/2023    BUN 51 (H) 08/10/2023    CREATININE 8.4 (H) 08/10/2023    CALCIUM 9.4 08/10/2023    ANIONGAP 16 08/10/2023    ESTGFRAFRICA 8 (A) 2022    ESTGFRAFRICA 8 (A) 2022    EGFRNONAA 7 (A) 2022    EGFRNONAA 7 (A) 2022       No results found for: "CPEPTIDE"  No results found for: "GLUTAMICACID"    There is no height or weight on file to calculate BMI.    Wt Readings from Last 3 Encounters:   23 1959 65.5 kg (144 lb 6.4 oz)   23 1308 65.4 kg (144 lb 2.9 oz)   23 1448 62.6 kg (138 lb 1.6 oz)   23 0758 " 63 kg (138 lb 14.2 oz)      Her blood sugar in clinic today is: Not assessed due to type of visit.    Diabetes Management Status  Statin: Taking  ACE/ARB: Not taking    Screening or Prevention Patient's value Goal Complete/Controlled?   HgA1C Testing and Control   Lab Results   Component Value Date    HGBA1C 6.6 (H) 2023      Annually/Less than 8% Yes   Lipid profile : 2023 Annually No   LDL control Lab Results   Component Value Date    LDLCALC 102.8 2022    Annually/Less than 100 mg/dl  No   Nephropathy screening Lab Results   Component Value Date    MICALBCREAT 1244.0 (H) 2018     Lab Results   Component Value Date    PROTEINUA 3+ (A) 2022    Annually Yes   Blood pressure BP Readings from Last 1 Encounters:   08/10/23 124/60    Less than 140/90 Yes   Dilated retinal exam : 2022 Annually Yes    Foot exam   : 2022 Annually No     Social History     Socioeconomic History    Marital status:    Tobacco Use    Smoking status: Former     Current packs/day: 0.00     Average packs/day: 1.5 packs/day for 30.0 years (45.0 ttl pk-yrs)     Types: Cigarettes     Start date: 1960     Quit date: 1990     Years since quittin.6    Smokeless tobacco: Former   Substance and Sexual Activity    Alcohol use: No     Alcohol/week: 0.0 standard drinks of alcohol    Drug use: No    Sexual activity: Not Currently     Social Determinants of Health     Financial Resource Strain: Low Risk  (2023)    Overall Financial Resource Strain (CARDIA)     Difficulty of Paying Living Expenses: Not very hard   Food Insecurity: No Food Insecurity (2023)    Hunger Vital Sign     Worried About Running Out of Food in the Last Year: Never true     Ran Out of Food in the Last Year: Never true   Transportation Needs: Unmet Transportation Needs (2023)    PRAPARE - Transportation     Lack of Transportation (Medical): Yes     Lack of Transportation (Non-Medical): No   Physical Activity:  Inactive (1/24/2023)    Exercise Vital Sign     Days of Exercise per Week: 0 days     Minutes of Exercise per Session: 0 min   Stress: Stress Concern Present (1/24/2023)    [employer] [employer] - Occupational Stress Questionnaire     Feeling of Stress : To some extent   Social Connections: Moderately Integrated (1/24/2023)    Social Connection and Isolation Panel [NHANES]     Frequency of Communication with Friends and Family: More than three times a week     Frequency of Social Gatherings with Friends and Family: More than three times a week     Attends Worship Services: More than 4 times per year     Active Member of Clubs or Organizations: No     Attends Club or Organization Meetings: Never     Marital Status:    Housing Stability: Low Risk  (1/24/2023)    Housing Stability Vital Sign     Unable to Pay for Housing in the Last Year: No     Number of Places Lived in the Last Year: 1     Unstable Housing in the Last Year: No     Past Medical History:   Diagnosis Date    Acute hypoxemic respiratory failure 11/26/2018    Anemia     Arthritis     back, hand, knees    Back pain     Cataract     CKD stage G4/A3, GFR 15 - 29 and albumin creatinine ratio >300 mg/g     GFR 40% Jan 2014 and 33% ub 3/2014 (BRG)    Coronary artery disease involving native coronary artery of native heart 11/30/2018    Diabetic retinopathy     DM (diabetes mellitus) type II controlled, neurological manifestation     Exudative age-related macular degeneration of left eye with active choroidal neovascularization 2/5/2019    GERD (gastroesophageal reflux disease)     Glaucoma     Heart failure     Hyperlipidemia     Hypertension     Non-ST elevation MI (NSTEMI) 11/28/2018    NSTEMI (non-ST elevated myocardial infarction) 11/27/2018    Peripheral neuropathy     Polyneuropathy     Proteinuria     >6 mo     Seizures     BRG 1/2014 -dick CT NRI showed small vessel    Skin ulcer of toe of right foot with fat layer exposed  10/14/2022    Stroke 2012,2014    Tobacco dependence     Type 2 diabetes with peripheral circulatory disorder, controlled      Review of Systems   Constitutional: Negative for activity change, appetite change, fatigue and unexpected weight change.   HENT: Negative for dental problem and trouble swallowing.    Eyes: Negative for visual disturbance. Legally blind.  Respiratory: Negative for chest tightness and shortness of breath.    Cardiovascular: Negative for chest pain, palpitations and leg swelling.   Gastrointestinal: Negative for abdominal pain, constipation, diarrhea, nausea and vomiting.   Endocrine: Negative for polydipsia, polyphagia and polyuria.   Genitourinary: Negative for difficulty urinating, dysuria, frequency and urgency.        Negative yeast infection   Musculoskeletal: Negative for gait problem, myalgias and neck pain.   Integumentary:  Negative for rash and wound.   Allergic/Immunologic: Negative.    Neurological: Positive for numbness (intermittent both feet). Negative for dizziness, syncope, weakness and headaches.        Int tingling BLE, hands   Hematological: Negative.    Psychiatric/Behavioral: Negative for confusion and sleep disturbance.   All other systems reviewed and are negative.     Objective:      Physical Exam  Neurological:      Mental Status: Alert and oriented to person, place, and time. Mental status is at baseline.   Psychiatric:         Attention and Perception: Attention normal.         Mood and Affect: Mood normal.         Speech: Speech normal.         Thought Content: Thought content normal.         Cognition and Memory: Cognition normal.         Judgment: Judgment normal.     Assessment / Plan:     Diagnoses and all orders for this visit:    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, with long-term current use of insulin  -     Ambulatory referral/consult to Diabetes Education; Future  -     insulin aspart protamine-insulin aspart  (NOVOLOG MIX 70-30FLEXPEN U-100) 100 unit/mL (70-30) InPn pen; Inject 22 units into skin before breakfast, 10 units before dinner. On dialysis days, give 12 units before dinner.    Hypertension associated with diabetes    Hyperlipidemia associated with type 2 diabetes mellitus    ESRD (end stage renal disease)    Additional Plan Details:  - Condition: controlled, most recent A1c of 6.6% was completed 4 months ago. BG readings elevated recently.  - Monitor blood glucose:  3-4x daily.Goals reviewed. Maintain BG log. Send BG log in 2 weeks for review. Pt would benefit from CGM use. Will reorder Piper 2 supplies via [employer].   - CGM note: Patient is testing 4 times per day. Patient is injecting meal time insulin 2 times daily. Patient requires CGM for blood sugar monitoring due to frequent insulin dose changes. Patient reports history of hypoglycemia. Of note, she is legally blind.  - Hypoglycemia protocol: Reviewed and risk discussed.  Notify if BG readings below 80. Protocol printout provided.   - Diet: Limit carbohydrate intake 30-45 grams/meal, 3x daily and 15 grams/snack, limit 2/day.   - Activity: Recommend at least 150 minutes of exercise in a week.  - BP and LDL: Recommend lifestyle modifications and follow up with PCP for management.   - Medication Regimen:     Continue Humulin 70/30   On dialysis days MWF, increase to 12 units before dinner  On non- dialysis days, increase 22 units before breakfast and increase to 10 units before dinner    - Medication consideration: Continue regimen. Adjust insulin dose pending BG log review.  - Lab results: A1C discussed.  - Health Maintenance standards addressed today:  A1c to be scheduled.   - Risks of uncontrolled DM explained. Importance of routine foot and eye exams reviewed. Scheduled as appropriate.  -The patient was explained the above plan and given opportunity to ask questions.  She understands, chooses and consents to this plan and accepts all the risks, which  include but are not limited to the risks mentioned above.   - Labs ordered as above.  - CDE referral: Ordered for CGM Piper training once supplies are available.  - Follow up: 4 weeks audio and in 3 months, in clinic.        Charlotte T. Delacruz, FNP-C [facility]    A total of 14 minutes was spent in face to face time, of which over 50 % was spent in counseling patient on disease process, complications, treatment, and side effects of medications.

## 2023-08-15 NOTE — Clinical Note
F/u 4-6 wks audio and in 3 mos, in clinic Order Piper 2 sensors and  via [facility]. Enter under comments pt is legally blind//

## 2023-08-15 NOTE — PATIENT INSTRUCTIONS
Medication Regimen:   Continue Humulin 70/30   On dialysis days MWF, increase to 12 units before dinner  On non- dialysis days, increase 22 units before breakfast and increase to 10 units before dinner    Patient Instructions:  Carbohydrate Count: 30-45 grams/meal and 15 grams/snack with limit of 2 snacks per day.  Exercise: Goal is 150 minutes or more per week.  Bring meter and blood sugar log to each appointment.     Goals for Blood Sugar:  Fastin-130 mg/dl  2 hours after meal:  mg/dl  Before Bed: 100-150 mg/dl  If Blood sugar is below 70 mg/dl, DO NOT take diabetes medication. Eat first and then recheck blood sugar in 20 minutes.  Foods to eat if blood sugar is below 70 mg/dl  1/2 glass of orange juice   1/2 regular cola can   3-4 hard candies   3-4 small glucose tabs.   Foods to eat if blood sugar is below 50 mg/dl  1 glass of orange juice  1 regular cola can  8 hard candies  8 small glucose tabs.   If you have repeated eating/blood sugar recheck process 2 times and blood sugar still below 70 mg/dl, call 911.

## 2023-08-17 ENCOUNTER — OFFICE VISIT (OUTPATIENT)
Dept: CARDIOLOGY | Facility: CLINIC | Age: 78
End: 2023-08-17
Payer: MEDICARE

## 2023-08-17 VITALS
BODY MASS INDEX: 23.68 KG/M2 | SYSTOLIC BLOOD PRESSURE: 146 MMHG | HEIGHT: 64 IN | WEIGHT: 138.69 LBS | DIASTOLIC BLOOD PRESSURE: 50 MMHG | OXYGEN SATURATION: 98 % | HEART RATE: 62 BPM

## 2023-08-17 DIAGNOSIS — I50.42 CHRONIC COMBINED SYSTOLIC AND DIASTOLIC HEART FAILURE: ICD-10-CM

## 2023-08-17 DIAGNOSIS — I65.23 BILATERAL CAROTID ARTERY STENOSIS: ICD-10-CM

## 2023-08-17 DIAGNOSIS — I34.0 NON-RHEUMATIC MITRAL REGURGITATION: ICD-10-CM

## 2023-08-17 DIAGNOSIS — E11.59 HYPERTENSION ASSOCIATED WITH DIABETES: Primary | ICD-10-CM

## 2023-08-17 DIAGNOSIS — Z95.1 S/P CABG (CORONARY ARTERY BYPASS GRAFT): ICD-10-CM

## 2023-08-17 DIAGNOSIS — I36.1 NON-RHEUMATIC TRICUSPID VALVE INSUFFICIENCY: ICD-10-CM

## 2023-08-17 DIAGNOSIS — E78.5 HYPERLIPIDEMIA ASSOCIATED WITH TYPE 2 DIABETES MELLITUS: ICD-10-CM

## 2023-08-17 DIAGNOSIS — I25.118 CORONARY ARTERY DISEASE OF NATIVE ARTERY OF NATIVE HEART WITH STABLE ANGINA PECTORIS: ICD-10-CM

## 2023-08-17 DIAGNOSIS — I70.0 AORTIC ATHEROSCLEROSIS: ICD-10-CM

## 2023-08-17 DIAGNOSIS — I27.20 PULMONARY HYPERTENSION: ICD-10-CM

## 2023-08-17 DIAGNOSIS — E11.69 HYPERLIPIDEMIA ASSOCIATED WITH TYPE 2 DIABETES MELLITUS: ICD-10-CM

## 2023-08-17 DIAGNOSIS — I15.2 HYPERTENSION ASSOCIATED WITH DIABETES: Primary | ICD-10-CM

## 2023-08-17 PROCEDURE — 1160F PR REVIEW ALL MEDS BY PRESCRIBER/CLIN PHARMACIST DOCUMENTED: ICD-10-PCS | Mod: HCNC,CPTII,S$GLB,

## 2023-08-17 PROCEDURE — 99214 PR OFFICE/OUTPT VISIT, EST, LEVL IV, 30-39 MIN: ICD-10-PCS | Mod: HCNC,S$GLB,,

## 2023-08-17 PROCEDURE — 99999 PR PBB SHADOW E&M-EST. PATIENT-LVL III: CPT | Mod: PBBFAC,HCNC,,

## 2023-08-17 PROCEDURE — 1101F PR PT FALLS ASSESS DOC 0-1 FALLS W/OUT INJ PAST YR: ICD-10-PCS | Mod: HCNC,CPTII,S$GLB,

## 2023-08-17 PROCEDURE — 1159F MED LIST DOCD IN RCRD: CPT | Mod: HCNC,CPTII,S$GLB,

## 2023-08-17 PROCEDURE — 1159F PR MEDICATION LIST DOCUMENTED IN MEDICAL RECORD: ICD-10-PCS | Mod: HCNC,CPTII,S$GLB,

## 2023-08-17 PROCEDURE — 1126F PR PAIN SEVERITY QUANTIFIED, NO PAIN PRESENT: ICD-10-PCS | Mod: HCNC,CPTII,S$GLB,

## 2023-08-17 PROCEDURE — 3077F PR MOST RECENT SYSTOLIC BLOOD PRESSURE >= 140 MM HG: ICD-10-PCS | Mod: HCNC,CPTII,S$GLB,

## 2023-08-17 PROCEDURE — 99999 PR PBB SHADOW E&M-EST. PATIENT-LVL III: ICD-10-PCS | Mod: PBBFAC,HCNC,,

## 2023-08-17 PROCEDURE — 3078F PR MOST RECENT DIASTOLIC BLOOD PRESSURE < 80 MM HG: ICD-10-PCS | Mod: HCNC,CPTII,S$GLB,

## 2023-08-17 PROCEDURE — 99214 OFFICE O/P EST MOD 30 MIN: CPT | Mod: HCNC,S$GLB,,

## 2023-08-17 PROCEDURE — 3078F DIAST BP <80 MM HG: CPT | Mod: HCNC,CPTII,S$GLB,

## 2023-08-17 PROCEDURE — 1126F AMNT PAIN NOTED NONE PRSNT: CPT | Mod: HCNC,CPTII,S$GLB,

## 2023-08-17 PROCEDURE — 1157F PR ADVANCE CARE PLAN OR EQUIV PRESENT IN MEDICAL RECORD: ICD-10-PCS | Mod: HCNC,CPTII,S$GLB,

## 2023-08-17 PROCEDURE — 3288F FALL RISK ASSESSMENT DOCD: CPT | Mod: HCNC,CPTII,S$GLB,

## 2023-08-17 PROCEDURE — 1157F ADVNC CARE PLAN IN RCRD: CPT | Mod: HCNC,CPTII,S$GLB,

## 2023-08-17 PROCEDURE — 3288F PR FALLS RISK ASSESSMENT DOCUMENTED: ICD-10-PCS | Mod: HCNC,CPTII,S$GLB,

## 2023-08-17 PROCEDURE — 1160F RVW MEDS BY RX/DR IN RCRD: CPT | Mod: HCNC,CPTII,S$GLB,

## 2023-08-17 PROCEDURE — 3077F SYST BP >= 140 MM HG: CPT | Mod: HCNC,CPTII,S$GLB,

## 2023-08-17 PROCEDURE — 1101F PT FALLS ASSESS-DOCD LE1/YR: CPT | Mod: HCNC,CPTII,S$GLB,

## 2023-08-17 RX ORDER — AMLODIPINE BESYLATE 5 MG/1
5 TABLET ORAL DAILY
Status: ON HOLD | COMMUNITY
Start: 2023-07-24 | End: 2023-11-14 | Stop reason: HOSPADM

## 2023-08-17 NOTE — PROGRESS NOTES
Subjective:   Patient ID:  Avril Monzon is a 78 y.o. female who presents for evaluation of No chief complaint on file.      HPI78y/o AA female with PMHx of CAD, PE, HTN, aortic atherosclerosis, chronic diastolic heart failure, HLD, HTN, CABG, DM who presents to clinic today for follow up. Has been doing well clinically has no chest pain, still has hypotension episodes with dialysis. Compliant with medications at home, right toes heeling well, seeing podiatry. No fever chills syncope near syncope chf symptoms or angina.    8/17/23  Here today for CV follow up. Pt was admitted recently after missing dialysis. Slight bump in troponin likely demand, here today and denies any CP, angina or anginal equivalent at this time. Reports she feels her best today since being DC        Past Medical History:   Diagnosis Date    Acute hypoxemic respiratory failure 11/26/2018    Anemia     Arthritis     back, hand, knees    Back pain     Cataract     CKD stage G4/A3, GFR 15 - 29 and albumin creatinine ratio >300 mg/g     GFR 40% Jan 2014 and 33% ub 3/2014 (BRG)    Coronary artery disease involving native coronary artery of native heart 11/30/2018    Diabetic retinopathy     DM (diabetes mellitus) type II controlled, neurological manifestation     Exudative age-related macular degeneration of left eye with active choroidal neovascularization 2/5/2019    GERD (gastroesophageal reflux disease)     Glaucoma     Heart failure     Hyperlipidemia     Hypertension     Non-ST elevation MI (NSTEMI) 11/28/2018    NSTEMI (non-ST elevated myocardial infarction) 11/27/2018    Peripheral neuropathy     Polyneuropathy     Proteinuria     >6 mo     Seizures     BRG 1/2014 -dick CT NRI showed small vessel    Skin ulcer of toe of right foot with fat layer exposed 10/14/2022    Stroke 2012,2014    Tobacco dependence     Type 2 diabetes with peripheral circulatory disorder, controlled        Past Surgical History:   Procedure Laterality Date    BREAST  LUMPECTOMY Left     benign, when patient was 15 years old    BREAST MASS EXCISION      ,benign, age 15.    CATHETERIZATION OF BOTH LEFT AND RIGHT HEART N/A 11/28/2018    Procedure: CATHETERIZATION, HEART, BOTH LEFT AND RIGHT;  Surgeon: Michelle Holman MD;  Location: [facility] CATH LAB;  Service: Cardiology;  Laterality: N/A;    CATHETERIZATION OF BOTH LEFT AND RIGHT HEART N/A 11/30/2018    Procedure: CATHETERIZATION, HEART, BOTH LEFT AND RIGHT;  Surgeon: Michelle Holman MD;  Location: [facility] CATH LAB;  Service: Cardiology;  Laterality: N/A;    CORONARY ARTERY BYPASS GRAFT      CORONARY ARTERY BYPASS GRAFT (CABG) N/A 7/20/2022    Procedure: CORONARY ARTERY BYPASS GRAFT (CABG);  Surgeon: Sanju Locke MD;  Location: [facility] OR;  Service: Cardiothoracic;  Laterality: N/A;  2-VESSEL PUMP ASSIST WITH EPI-AORTIC ULTRASOUND    ENDOSCOPIC HARVEST OF VEIN Left 7/20/2022    Procedure: SURGICAL PROCUREMENT, VEIN, ENDOSCOPIC;  Surgeon: Sanju Locke MD;  Location: [facility];  Service: Cardiothoracic;  Laterality: Left;    HYSTERECTOMY  1982    INJECTION OF ANESTHETIC AGENT AROUND MULTIPLE INTERCOSTAL NERVES N/A 7/20/2022    Procedure: BLOCK, NERVE, INTERCOSTAL, 2 OR MORE;  Surgeon: Sanju Locke MD;  Location: [facility] OR;  Service: Cardiothoracic;  Laterality: N/A;  PARASTERNAL NERVE BLOCK    INSERTION OF SHUNT      LEFT HEART CATHETERIZATION Left 12/3/2018    Procedure: CATHETERIZATION, HEART, LEFT;  Surgeon: Michelle Holman MD;  Location: [facility] CATH LAB;  Service: Cardiology;  Laterality: Left;  LAD ATHERECTOMY STENT/ FEMORAL APPROACH    LEFT HEART CATHETERIZATION Left 7/13/2022    Procedure: CATHETERIZATION, HEART, LEFT;  Surgeon: Abhi Sloan MD;  Location: [facility] CATH LAB;  Service: Cardiology;  Laterality: Left;    OOPHORECTOMY      wrist cyst Right 1980s    dorsal wrist cyst       Social History     Tobacco Use    Smoking status: Former     Current packs/day: 0.00     Average packs/day: 1.5 packs/day for 30.0 years  "(45.0 ttl pk-yrs)     Types: Cigarettes     Start date: 1960     Quit date: 1990     Years since quittin.6    Smokeless tobacco: Former   Substance Use Topics    Alcohol use: No     Alcohol/week: 0.0 standard drinks of alcohol    Drug use: No       Family History   Problem Relation Age of Onset    Diabetes Mother     Hyperlipidemia Mother     Hypertension Mother     Heart disease Mother         MI    Muscular dystrophy Son     Cancer Maternal Grandmother         colon       Current Outpatient Medications on File Prior to Visit   Medication Sig Dispense Refill    aspirin (ECOTRIN) 81 MG EC tablet Take 1 tablet (81 mg total) by mouth once daily. 90 tablet 6    atropine 1% (ISOPTO ATROPINE) 1 % Drop Place 1 drop into the left eye 3 (three) times daily.      blood sugar diagnostic (TRUE METRIX GLUCOSE TEST STRIP) Strp 1 strip by Misc.(Non-Drug; Combo Route) route 3 (three) times daily. 100 each 11    blood-glucose meter (TRUE METRIX GLUCOSE METER) Misc Use as instructed 1 each 0    insulin aspart protamine-insulin aspart (NOVOLOG MIX 70-30FLEXPEN U-100) 100 unit/mL (70-30) InPn pen Inject 22 units into skin before breakfast, 10 units before dinner. On dialysis days, give 12 units before dinner. 15 mL 3    lancets (TRUEPLUS LANCETS) 33 gauge Misc 1 lancet by Misc.(Non-Drug; Combo Route) route 3 (three) times daily. 100 each 11    loratadine (CLARITIN) 10 mg tablet Take 1 tablet (10 mg total) by mouth once daily. 30 tablet 3    metoprolol tartrate (LOPRESSOR) 50 MG tablet Take 1 tablet (50 mg total) by mouth 2 (two) times daily. 180 tablet 11    pen needle, diabetic 32 gauge x 5/32" Ndle 1 each by Misc.(Non-Drug; Combo Route) route 2 (two) times a day. 100 each 11    rosuvastatin (CRESTOR) 20 MG tablet Take 1 tablet (20 mg total) by mouth once daily. 90 tablet 3    amLODIPine (NORVASC) 5 MG tablet Take 5 mg by mouth.      azelastine (ASTELIN) 137 mcg (0.1 %) nasal spray 1 spray (137 mcg total) by Nasal route " 2 (two) times daily. (Patient not taking: Reported on 8/15/2023) 30 mL 0    cyanocobalamin (VITAMIN B-12) 1000 MCG tablet Take 100 mcg by mouth once daily.      folic acid (FOLVITE) 1 MG tablet Take 1 mg by mouth once daily.      ondansetron (ZOFRAN-ODT) 4 MG TbDL Take 1 tablet (4 mg total) by mouth every 8 (eight) hours as needed (nausea/vomiting). (Patient not taking: Reported on 8/15/2023) 12 tablet 0    sevelamer carbonate (RENVELA) 800 mg Tab Take 3 tablets (2,400 mg) by mouth three times a day with meals and 2 tablets (1,600 mg) by mouth with snacks (Patient not taking: Reported on 8/17/2023) 1170 tablet 3    [DISCONTINUED] ammonium lactate 12 % Crea Apply 1 application topically 2 (two) times daily. For calluses (Patient not taking: Reported on 8/17/2023) 140 g 2    [DISCONTINUED] fluocinonide (LIDEX) 0.05 % external solution Apply topically 2 (two) times daily. Use on scalp (Patient not taking: Reported on 8/17/2023) 60 mL 5    [DISCONTINUED] isosorbide-hydrALAZINE 20-37.5 mg (BIDIL) 20-37.5 mg Tab Take 1 tablet by mouth 3 (three) times daily. 90 tablet 1    [DISCONTINUED] mupirocin (BACTROBAN) 2 % ointment Apply topically 2 (two) times daily. (Patient not taking: Reported on 8/15/2023) 22 g 0     No current facility-administered medications on file prior to visit.      Wt Readings from Last 3 Encounters:   08/17/23 62.9 kg (138 lb 10.7 oz)   08/09/23 65.5 kg (144 lb 6.4 oz)   08/03/23 62.6 kg (138 lb 1.6 oz)     Temp Readings from Last 3 Encounters:   08/10/23 98.6 °F (37 °C) (Oral)   08/03/23 98.2 °F (36.8 °C) (Oral)   04/27/23 97.9 °F (36.6 °C) (Oral)     BP Readings from Last 3 Encounters:   08/17/23 (!) 146/50   08/10/23 124/60   08/03/23 (!) 136/52     Pulse Readings from Last 3 Encounters:   08/17/23 62   08/10/23 75   08/03/23 68        Review of Systems   Constitutional: Negative.   HENT: Negative.     Eyes: Negative.    Cardiovascular: Negative.    Respiratory: Negative.     Skin: Negative.     Musculoskeletal: Negative.    Gastrointestinal: Negative.    Genitourinary: Negative.    Neurological: Negative.    Psychiatric/Behavioral: Negative.         Objective:   Physical Exam  Vitals and nursing note reviewed.   Constitutional:       Appearance: Normal appearance.   HENT:      Head: Normocephalic.   Eyes:      Pupils: Pupils are equal, round, and reactive to light.   Cardiovascular:      Rate and Rhythm: Normal rate and regular rhythm.      Pulses:           Carotid pulses are  on the right side with bruit and  on the left side with bruit.     Heart sounds: Normal heart sounds, S1 normal and S2 normal. No murmur heard.     No S3 or S4 sounds.   Pulmonary:      Effort: Pulmonary effort is normal.      Breath sounds: Normal breath sounds.   Abdominal:      General: Bowel sounds are normal.      Palpations: Abdomen is soft.   Musculoskeletal:         General: Normal range of motion.      Cervical back: Normal range of motion.   Skin:     Capillary Refill: Capillary refill takes less than 2 seconds.   Neurological:      General: No focal deficit present.      Mental Status: She is alert and oriented to person, place, and time.   Psychiatric:         Mood and Affect: Mood normal.         Behavior: Behavior normal.         Thought Content: Thought content normal.         Lab Results   Component Value Date    CHOL 171 07/13/2022    CHOL 246 (H) 10/19/2021    CHOL 239 (H) 01/26/2021     Lab Results   Component Value Date    HDL 59 07/13/2022    HDL 62 10/19/2021    HDL 82 (H) 01/26/2021     Lab Results   Component Value Date    LDLCALC 102.8 07/13/2022    LDLCALC 159.6 (H) 10/19/2021    LDLCALC 136.2 01/26/2021     Lab Results   Component Value Date    TRIG 46 07/13/2022    TRIG 122 10/19/2021    TRIG 104 01/26/2021     Lab Results   Component Value Date    CHOLHDL 34.5 07/13/2022    CHOLHDL 25.2 10/19/2021    CHOLHDL 34.3 01/26/2021       Chemistry        Component Value Date/Time     08/10/2023 0639    K  4.6 08/10/2023 0639    CL 99 08/10/2023 0639    CO2 22 (L) 08/10/2023 0639    BUN 51 (H) 08/10/2023 0639    CREATININE 8.4 (H) 08/10/2023 0639     (H) 08/10/2023 0639        Component Value Date/Time    CALCIUM 9.4 08/10/2023 0639    ALKPHOS 67 08/10/2023 0639    AST 9 (L) 08/10/2023 0639    ALT 5 (L) 08/10/2023 0639    BILITOT 0.4 08/10/2023 0639    ESTGFRAFRICA 8 (A) 07/27/2022 0433    ESTGFRAFRICA 8 (A) 07/27/2022 0433    EGFRNONAA 7 (A) 07/27/2022 0433    EGFRNONAA 7 (A) 07/27/2022 0433          Lab Results   Component Value Date    TSH 1.686 08/04/2023     Lab Results   Component Value Date    INR 1.0 08/07/2022    INR 1.3 (H) 07/21/2022    INR 1.4 (H) 07/20/2022     @RESUFAST(WBC,HGB,HCT,MCV,PLT)  @LABRCNTIP(BNP,BNPTRIAGEBLO)@  CrCl cannot be calculated (Patient's most recent lab result is older than the maximum 7 days allowed.).     Results for orders placed during the hospital encounter of 07/12/22    Echo    Interpretation Summary  · Concentric hypertrophy and normal systolic function.  · The estimated ejection fraction is 55%.  · Normal left ventricular diastolic function.  · Normal right ventricular size with normal right ventricular systolic function.  · Moderate tricuspid regurgitation.  · There is pulmonary hypertension.     Results for orders placed during the hospital encounter of 01/04/22    Nuclear Stress - Cardiology Interpreted    Interpretation Summary    Abnormal myocardial perfusion scan.    There is a severe intensity, moderate to large sized fixed defect consistent with scar in the anteroapical wall(s).    The gated perfusion images showed an ejection fraction of 64% at rest. The gated perfusion images showed an ejection fraction of 65% post stress.    The EKG portion of this study is negative for ischemia.    During stress, rare PVCs are noted.     Assessment:      1. Hypertension associated with diabetes    2. Bilateral carotid artery stenosis    3. Aortic atherosclerosis    4.  Hyperlipidemia associated with type 2 diabetes mellitus    5. Pulmonary hypertension    6. Coronary artery disease of native artery of native heart with stable angina pectoris    7. Non-rheumatic mitral regurgitation    8. Non-rheumatic tricuspid valve insufficiency    9. Chronic combined systolic and diastolic heart failure    10. S/P CABG (coronary artery bypass graft)          Plan:   Hypertension associated with diabetes    Bilateral carotid artery stenosis    Aortic atherosclerosis    Hyperlipidemia associated with type 2 diabetes mellitus    Pulmonary hypertension    Coronary artery disease of native artery of native heart with stable angina pectoris    Non-rheumatic mitral regurgitation    Non-rheumatic tricuspid valve insufficiency    Chronic combined systolic and diastolic heart failure    S/P CABG (coronary artery bypass graft)        Cont current medications, RF modifications, low fat high fiber diet, low Na diet, daily exercise as tolerated      RTC 6 months with Dr. River Sosa, [facility], Cardiology

## 2023-08-22 ENCOUNTER — PATIENT OUTREACH (OUTPATIENT)
Dept: ADMINISTRATIVE | Facility: OTHER | Age: 78
End: 2023-08-22
Payer: MEDICARE

## 2023-08-22 ENCOUNTER — OFFICE VISIT (OUTPATIENT)
Dept: PRIMARY CARE CLINIC | Facility: CLINIC | Age: 78
End: 2023-08-22
Payer: MEDICARE

## 2023-08-22 VITALS
WEIGHT: 137.81 LBS | DIASTOLIC BLOOD PRESSURE: 52 MMHG | BODY MASS INDEX: 23.53 KG/M2 | HEART RATE: 64 BPM | HEIGHT: 64 IN | OXYGEN SATURATION: 98 % | SYSTOLIC BLOOD PRESSURE: 164 MMHG | TEMPERATURE: 98 F

## 2023-08-22 DIAGNOSIS — R53.83 MALAISE AND FATIGUE: ICD-10-CM

## 2023-08-22 DIAGNOSIS — Z79.4 TYPE 2 DIABETES MELLITUS WITH DIABETIC PERIPHERAL ANGIOPATHY WITHOUT GANGRENE, WITH LONG-TERM CURRENT USE OF INSULIN: Primary | ICD-10-CM

## 2023-08-22 DIAGNOSIS — R53.81 MALAISE AND FATIGUE: ICD-10-CM

## 2023-08-22 DIAGNOSIS — E11.51 TYPE 2 DIABETES MELLITUS WITH DIABETIC PERIPHERAL ANGIOPATHY WITHOUT GANGRENE, WITH LONG-TERM CURRENT USE OF INSULIN: Primary | ICD-10-CM

## 2023-08-22 DIAGNOSIS — M54.50 ACUTE RIGHT-SIDED LOW BACK PAIN WITHOUT SCIATICA: ICD-10-CM

## 2023-08-22 PROCEDURE — 1160F PR REVIEW ALL MEDS BY PRESCRIBER/CLIN PHARMACIST DOCUMENTED: ICD-10-PCS | Mod: HCNC,CPTII,S$GLB, | Performed by: FAMILY MEDICINE

## 2023-08-22 PROCEDURE — 3077F SYST BP >= 140 MM HG: CPT | Mod: HCNC,CPTII,S$GLB, | Performed by: FAMILY MEDICINE

## 2023-08-22 PROCEDURE — 1159F PR MEDICATION LIST DOCUMENTED IN MEDICAL RECORD: ICD-10-PCS | Mod: HCNC,CPTII,S$GLB, | Performed by: FAMILY MEDICINE

## 2023-08-22 PROCEDURE — 3077F PR MOST RECENT SYSTOLIC BLOOD PRESSURE >= 140 MM HG: ICD-10-PCS | Mod: HCNC,CPTII,S$GLB, | Performed by: FAMILY MEDICINE

## 2023-08-22 PROCEDURE — 99214 OFFICE O/P EST MOD 30 MIN: CPT | Mod: HCNC,25,S$GLB, | Performed by: FAMILY MEDICINE

## 2023-08-22 PROCEDURE — 3288F FALL RISK ASSESSMENT DOCD: CPT | Mod: HCNC,CPTII,S$GLB, | Performed by: FAMILY MEDICINE

## 2023-08-22 PROCEDURE — 1159F MED LIST DOCD IN RCRD: CPT | Mod: HCNC,CPTII,S$GLB, | Performed by: FAMILY MEDICINE

## 2023-08-22 PROCEDURE — 3288F PR FALLS RISK ASSESSMENT DOCUMENTED: ICD-10-PCS | Mod: HCNC,CPTII,S$GLB, | Performed by: FAMILY MEDICINE

## 2023-08-22 PROCEDURE — 1101F PT FALLS ASSESS-DOCD LE1/YR: CPT | Mod: HCNC,CPTII,S$GLB, | Performed by: FAMILY MEDICINE

## 2023-08-22 PROCEDURE — 99999 PR PBB SHADOW E&M-EST. PATIENT-LVL V: CPT | Mod: PBBFAC,HCNC,, | Performed by: FAMILY MEDICINE

## 2023-08-22 PROCEDURE — 1101F PR PT FALLS ASSESS DOC 0-1 FALLS W/OUT INJ PAST YR: ICD-10-PCS | Mod: HCNC,CPTII,S$GLB, | Performed by: FAMILY MEDICINE

## 2023-08-22 PROCEDURE — 1157F ADVNC CARE PLAN IN RCRD: CPT | Mod: HCNC,CPTII,S$GLB, | Performed by: FAMILY MEDICINE

## 2023-08-22 PROCEDURE — 96372 PR INJECTION,THERAP/PROPH/DIAG2ST, IM OR SUBCUT: ICD-10-PCS | Mod: HCNC,S$GLB,, | Performed by: FAMILY MEDICINE

## 2023-08-22 PROCEDURE — 96372 THER/PROPH/DIAG INJ SC/IM: CPT | Mod: HCNC,S$GLB,, | Performed by: FAMILY MEDICINE

## 2023-08-22 PROCEDURE — 99214 PR OFFICE/OUTPT VISIT, EST, LEVL IV, 30-39 MIN: ICD-10-PCS | Mod: HCNC,25,S$GLB, | Performed by: FAMILY MEDICINE

## 2023-08-22 PROCEDURE — 3078F DIAST BP <80 MM HG: CPT | Mod: HCNC,CPTII,S$GLB, | Performed by: FAMILY MEDICINE

## 2023-08-22 PROCEDURE — 1157F PR ADVANCE CARE PLAN OR EQUIV PRESENT IN MEDICAL RECORD: ICD-10-PCS | Mod: HCNC,CPTII,S$GLB, | Performed by: FAMILY MEDICINE

## 2023-08-22 PROCEDURE — 3078F PR MOST RECENT DIASTOLIC BLOOD PRESSURE < 80 MM HG: ICD-10-PCS | Mod: HCNC,CPTII,S$GLB, | Performed by: FAMILY MEDICINE

## 2023-08-22 PROCEDURE — 99999 PR PBB SHADOW E&M-EST. PATIENT-LVL V: ICD-10-PCS | Mod: PBBFAC,HCNC,, | Performed by: FAMILY MEDICINE

## 2023-08-22 PROCEDURE — 1160F RVW MEDS BY RX/DR IN RCRD: CPT | Mod: HCNC,CPTII,S$GLB, | Performed by: FAMILY MEDICINE

## 2023-08-22 RX ORDER — METHYLPREDNISOLONE ACETATE 40 MG/ML
20 INJECTION, SUSPENSION INTRA-ARTICULAR; INTRALESIONAL; INTRAMUSCULAR; SOFT TISSUE ONCE
Status: COMPLETED | OUTPATIENT
Start: 2023-08-22 | End: 2023-08-22

## 2023-08-22 RX ADMIN — METHYLPREDNISOLONE ACETATE 20 MG: 40 INJECTION, SUSPENSION INTRA-ARTICULAR; INTRALESIONAL; INTRAMUSCULAR; SOFT TISSUE at 03:08

## 2023-08-22 NOTE — PROGRESS NOTES
"Subjective:       Patient ID: Avril Monzon is a 78 y.o. female.    Chief Complaint: Follow-up (Pt is here for a hospital follow up. Pt states she has no energy and tightness in her stomach. )    HPI:     Here for f/u medical problems and c/o low back pain. Hospitalized due to missing dialysis (M/W/F schedule); missed Friday and was hospitalized the next Wednesday. Now states she knows to go any way to dialysis even if she feels badly. Reports back pain has been off and on since fall in bathtub years ago. Steroid shot helpful last time it flared up as well as PT. Pain is left lumbar with no radicular symptoms. No new weakness, numbness. Had CABG last summer. She lives in her daughter's home with her  who has dementia and young adult grandchildren (22, 23). States "I'm never lonely."      No f/c/sw/cough. PND better with claritin.  No cp/palp/lightheaded.  Still with ALVARADO sometimes; discussed CHF and relationship to need for consistent HD.    Makes urine, smells very bad for past month--u/a ordered last visit but not yet done  No low sugars, on 70/30.  AC breakfast sugars 120s.      7/22:  Summary    Concentric hypertrophy and normal systolic function.  The estimated ejection fraction is 55%.  Normal left ventricular diastolic function.  Normal right ventricular size with normal right ventricular systolic function.  Moderate tricuspid regurgitation.  There is pulmonary hypertension.   ----------------------------------------------------------------------------------    HM: 10/20 fluvax, 1/21 covid vaccines/booster, 1/16 [id number], 5/14 [id number], 9/22 [id number], 12/21 TDaP, 2/21 sched BMD, 2/21 MMG, 2011 Cscope outside, 3/20 Eye Dr. Coello.      Objective:     Vitals:    08/22/23 1439   BP: (!) 164/52   Pulse: 64   Temp: 97.9 °F (36.6 °C)     Wt Readings from Last 3 Encounters:   08/22/23 62.5 kg (137 lb 12.8 oz)   08/17/23 62.9 kg (138 lb 10.7 oz)   08/09/23 65.5 kg (144 lb 6.4 oz)     Temp Readings from Last 3 " Encounters:   08/22/23 97.9 °F (36.6 °C) (Oral)   08/10/23 98.6 °F (37 °C) (Oral)   08/03/23 98.2 °F (36.8 °C) (Oral)     BP Readings from Last 3 Encounters:   08/22/23 (!) 164/52   08/17/23 (!) 146/50   08/10/23 124/60     Pulse Readings from Last 3 Encounters:   08/22/23 64   08/17/23 62   08/10/23 75          Physical Exam  Vitals and nursing note reviewed.   Constitutional:       Appearance: She is well-developed.   HENT:      Head: Normocephalic and atraumatic.   Eyes:      Pupils: Pupils are equal, round, and reactive to light.   Cardiovascular:      Rate and Rhythm: Normal rate and regular rhythm.   Pulmonary:      Effort: Pulmonary effort is normal.      Breath sounds: Normal breath sounds.   Musculoskeletal:         General: No deformity. Normal range of motion.      Cervical back: Normal range of motion and neck supple.   Skin:     General: Skin is warm and dry.   Neurological:      Mental Status: She is alert and oriented to person, place, and time.   Psychiatric:         Behavior: Behavior normal.           Albumin   Date Value Ref Range Status   08/10/2023 3.0 (L) 3.5 - 5.2 g/dL Final     eGFR   Date Value Ref Range Status   08/10/2023 5 (A) >60 mL/min/1.73 m^2 Final       Assessment:       1. Type 2 diabetes mellitus with diabetic peripheral angiopathy without gangrene, with long-term current use of insulin    2. Acute right-sided low back pain without sciatica        Plan:           Problem List Items Addressed This Visit          Endocrine    Type 2 diabetes mellitus with diabetic peripheral angiopathy without gangrene, with long-term current use of insulin - Primary    Relevant Orders    HEMOGLOBIN A1C     Other Visit Diagnoses       Acute right-sided low back pain without sciatica        Relevant Medications    methylPREDNISolone acetate injection 20 mg (Start on 8/22/2023  4:15 PM)    Other Relevant Orders    Ambulatory referral/consult to Physical/Occupational Therapy          Requests [facility] for  PT. Advised to abstain for sweets and concentrated carbs after steroid shot and monitor glucose. 3 month f/u

## 2023-08-22 NOTE — PATIENT INSTRUCTIONS
If you are feeling unwell, we'd like to be the first ones to know here at [facility]! Please give us a call. Same day appointments are our top priority to keep you well and out of the emergency rooms and hospitals. Call [phone] for our direct line. After hours advice is always available. Please call [phone] after hours to speak to the on-call team.

## 2023-08-22 NOTE — PROGRESS NOTES
This Community Health Worker (CHW) competed Social Determinant of Health (SDOH)  Questionnaire with patient/caregiver via telephone today.  Notified [facility] CM Debbie SAUNDERS RN,  of completion and that patient asked about rails for the tub.  MUKUND notified me that patient can use her [employer] OTC benefits for obtaining rails. Patient expressed understanding.     Patient denied any SDOH needs at this time.

## 2023-08-23 ENCOUNTER — TELEPHONE (OUTPATIENT)
Dept: PRIMARY CARE CLINIC | Facility: CLINIC | Age: 78
End: 2023-08-23
Payer: MEDICARE

## 2023-08-23 DIAGNOSIS — R53.83 MALAISE AND FATIGUE: Primary | ICD-10-CM

## 2023-08-23 DIAGNOSIS — R53.81 MALAISE AND FATIGUE: Primary | ICD-10-CM

## 2023-08-25 ENCOUNTER — OUTPATIENT CASE MANAGEMENT (OUTPATIENT)
Dept: ADMINISTRATIVE | Facility: OTHER | Age: 78
End: 2023-08-25
Payer: MEDICARE

## 2023-08-25 ENCOUNTER — TELEPHONE (OUTPATIENT)
Dept: DIABETES | Facility: CLINIC | Age: 78
End: 2023-08-25
Payer: MEDICARE

## 2023-08-31 ENCOUNTER — OFFICE VISIT (OUTPATIENT)
Dept: PODIATRY | Facility: CLINIC | Age: 78
End: 2023-08-31
Payer: MEDICARE

## 2023-08-31 VITALS — HEIGHT: 64 IN | WEIGHT: 137 LBS | BODY MASS INDEX: 23.39 KG/M2

## 2023-08-31 DIAGNOSIS — L60.0 INGROWN TOENAIL OF BOTH FEET: Primary | ICD-10-CM

## 2023-08-31 DIAGNOSIS — I73.9 PERIPHERAL ARTERIAL DISEASE: ICD-10-CM

## 2023-08-31 PROCEDURE — 99999 PR PBB SHADOW E&M-EST. PATIENT-LVL III: ICD-10-PCS | Mod: PBBFAC,HCNC,, | Performed by: PODIATRIST

## 2023-08-31 PROCEDURE — 3288F FALL RISK ASSESSMENT DOCD: CPT | Mod: HCNC,CPTII,S$GLB, | Performed by: PODIATRIST

## 2023-08-31 PROCEDURE — 1125F AMNT PAIN NOTED PAIN PRSNT: CPT | Mod: HCNC,CPTII,S$GLB, | Performed by: PODIATRIST

## 2023-08-31 PROCEDURE — 99999 PR PBB SHADOW E&M-EST. PATIENT-LVL III: CPT | Mod: PBBFAC,HCNC,, | Performed by: PODIATRIST

## 2023-08-31 PROCEDURE — 1157F PR ADVANCE CARE PLAN OR EQUIV PRESENT IN MEDICAL RECORD: ICD-10-PCS | Mod: HCNC,CPTII,S$GLB, | Performed by: PODIATRIST

## 2023-08-31 PROCEDURE — 1157F ADVNC CARE PLAN IN RCRD: CPT | Mod: HCNC,CPTII,S$GLB, | Performed by: PODIATRIST

## 2023-08-31 PROCEDURE — 99213 PR OFFICE/OUTPT VISIT, EST, LEVL III, 20-29 MIN: ICD-10-PCS | Mod: HCNC,S$GLB,, | Performed by: PODIATRIST

## 2023-08-31 PROCEDURE — 99213 OFFICE O/P EST LOW 20 MIN: CPT | Mod: HCNC,S$GLB,, | Performed by: PODIATRIST

## 2023-08-31 PROCEDURE — 1125F PR PAIN SEVERITY QUANTIFIED, PAIN PRESENT: ICD-10-PCS | Mod: HCNC,CPTII,S$GLB, | Performed by: PODIATRIST

## 2023-08-31 PROCEDURE — 1159F PR MEDICATION LIST DOCUMENTED IN MEDICAL RECORD: ICD-10-PCS | Mod: HCNC,CPTII,S$GLB, | Performed by: PODIATRIST

## 2023-08-31 PROCEDURE — 3288F PR FALLS RISK ASSESSMENT DOCUMENTED: ICD-10-PCS | Mod: HCNC,CPTII,S$GLB, | Performed by: PODIATRIST

## 2023-08-31 PROCEDURE — 1101F PR PT FALLS ASSESS DOC 0-1 FALLS W/OUT INJ PAST YR: ICD-10-PCS | Mod: HCNC,CPTII,S$GLB, | Performed by: PODIATRIST

## 2023-08-31 PROCEDURE — 1101F PT FALLS ASSESS-DOCD LE1/YR: CPT | Mod: HCNC,CPTII,S$GLB, | Performed by: PODIATRIST

## 2023-08-31 PROCEDURE — 1159F MED LIST DOCD IN RCRD: CPT | Mod: HCNC,CPTII,S$GLB, | Performed by: PODIATRIST

## 2023-08-31 NOTE — PROGRESS NOTES
[facility]  PODIATRIC MEDICINE AND SURGERY  PROGRESS NOTE  8/31/2023    PODIATRY NOTE  PCP: Rose Jackson MD, Last Visit 2/2/21    CHIEF COMPLAINT   Chief Complaint   Patient presents with    Ingrown Toenail     C/o ingrown toenail, b/l great toenail, medial border, rates pain 5/10, diabetic, wears casual shoes, last seen PCP Dr. Husain on 08/22/23       HPI:    Avril Monzon is a 78 y.o. female who has a past medical history of Acute hypoxemic respiratory failure (11/26/2018), Anemia, Arthritis, Back pain, Cataract, CKD stage G4/A3, GFR 15 - 29 and albumin creatinine ratio >300 mg/g, Coronary artery disease involving native coronary artery of native heart (11/30/2018), Diabetic retinopathy, DM (diabetes mellitus) type II controlled, neurological manifestation, Exudative age-related macular degeneration of left eye with active choroidal neovascularization (2/5/2019), GERD (gastroesophageal reflux disease), Glaucoma, Heart failure, Hyperlipidemia, Hypertension, Non-ST elevation MI (NSTEMI) (11/28/2018), NSTEMI (non-ST elevated myocardial infarction) (11/27/2018), Peripheral neuropathy, Polyneuropathy, Proteinuria, Seizures, Skin ulcer of toe of right foot with fat layer exposed (10/14/2022), Stroke (2012,2014), Tobacco dependence, and Type 2 diabetes with peripheral circulatory disorder, controlled.     Avril presents to clinic today f/u pain to medial aspect of both great toes. Symptoms are worse on right great toe. Pain is 5/10. No treatment to date.     Hemoglobin A1C   Date Value Ref Range Status   04/04/2023 6.6 (H) 4.0 - 5.6 % Final     Comment:     ADA Screening Guidelines:  5.7-6.4%  Consistent with prediabetes  >or=6.5%  Consistent with diabetes    High levels of fetal hemoglobin interfere with the HbA1C  assay. Heterozygous hemoglobin variants (HbS, HgC, etc)do  not significantly interfere with this assay.   However, presence of multiple variants may affect accuracy.      09/13/2022 5.3 4.0 - 5.6 % Final     Comment:     ADA Screening Guidelines:  5.7-6.4%  Consistent with prediabetes  >or=6.5%  Consistent with diabetes    High levels of fetal hemoglobin interfere with the HbA1C  assay. Heterozygous hemoglobin variants (HbS, HgC, etc)do  not significantly interfere with this assay.   However, presence of multiple variants may affect accuracy.     07/12/2022 9.3 (H) 4.0 - 5.6 % Final     Comment:     ADA Screening Guidelines:  5.7-6.4%  Consistent with prediabetes  >or=6.5%  Consistent with diabetes    High levels of fetal hemoglobin interfere with the HbA1C  assay. Heterozygous hemoglobin variants (HbS, HgC, etc)do  not significantly interfere with this assay.   However, presence of multiple variants may affect accuracy.     04/26/2021 7.3 % Final   10/26/2020 9.0 % Final     Comment:     Per Care Everywhere          PMH  Past Medical History:   Diagnosis Date    Acute hypoxemic respiratory failure 11/26/2018    Anemia     Arthritis     back, hand, knees    Back pain     Cataract     CKD stage G4/A3, GFR 15 - 29 and albumin creatinine ratio >300 mg/g     GFR 40% Jan 2014 and 33% ub 3/2014 ([facility])    Coronary artery disease involving native coronary artery of native heart 11/30/2018    Diabetic retinopathy     DM (diabetes mellitus) type II controlled, neurological manifestation     Exudative age-related macular degeneration of left eye with active choroidal neovascularization 2/5/2019    GERD (gastroesophageal reflux disease)     Glaucoma     Heart failure     Hyperlipidemia     Hypertension     Non-ST elevation MI (NSTEMI) 11/28/2018    NSTEMI (non-ST elevated myocardial infarction) 11/27/2018    Peripheral neuropathy     Polyneuropathy     Proteinuria     >6 mo     Seizures     [facility]G 1/2014 -dick CT NRI showed small vessel    Skin ulcer of toe of right foot with fat layer exposed 10/14/2022    Stroke 2012,2014    Tobacco dependence     Type 2 diabetes with peripheral circulatory  disorder, controlled        PROBLEM LIST  Patient Active Problem List    Diagnosis Date Noted    Dysuria 08/09/2023    Debility 08/09/2023    Nausea & vomiting 04/20/2023    Other reduced mobility 01/25/2023    Allergic rhinitis 01/17/2023    Type 2 diabetes mellitus with diabetic peripheral angiopathy without gangrene, with long-term current use of insulin 10/25/2022    Skin ulcer of toe of right foot with fat layer exposed 10/14/2022    COVID 08/03/2022    S/P CABG (coronary artery bypass graft) 07/20/2022    Hyperkalemia 07/12/2022    Metabolic acidosis 07/12/2022    Chronic combined systolic and diastolic heart failure 12/20/2021    Other specified complication of vascular prosthetic devices, implants and grafts, initial encounter 10/19/2021    Unspecified inflammatory spondylopathy, lumbar region 03/11/2021    Convulsions 03/11/2021    Hemiplegia 03/11/2021    DM type 2 with diabetic peripheral neuropathy 06/12/2020    Age-related nuclear cataract of left eye 05/06/2019    Non-rheumatic mitral regurgitation 04/05/2019    Non-rheumatic tricuspid valve insufficiency 04/05/2019    Exudative age-related macular degeneration of left eye with active choroidal neovascularization 02/05/2019    CAD (coronary artery disease) 11/30/2018    Pulmonary hypertension 11/28/2018    Hyperlipidemia associated with type 2 diabetes mellitus 06/08/2018    Iron deficiency anemia due to chronic blood loss 02/22/2018    Anemia in chronic kidney disease, on chronic dialysis 05/18/2017    History of CVA (cerebrovascular accident) 05/16/2017    Hyperparathyroidism 05/16/2017    Vitamin D deficiency 05/16/2017    DDD (degenerative disc disease), lumbar 05/16/2017    Bilateral carotid artery disease 11/11/2016    Aortic atherosclerosis 11/11/2016    Type 2 diabetes mellitus with circulatory disorder, with long-term current use of insulin 11/11/2016    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, with  "long-term current use of insulin 05/02/2016    Cataracta brunescens of right eye 05/02/2016    ESRD (end stage renal disease)     Proteinuria     Constipation 02/18/2014    Hypertension associated with diabetes        MEDS  Current Outpatient Medications on File Prior to Visit   Medication Sig Dispense Refill    amLODIPine (NORVASC) 5 MG tablet Take 5 mg by mouth.      aspirin (ECOTRIN) 81 MG EC tablet Take 1 tablet (81 mg total) by mouth once daily. 90 tablet 6    atropine 1% (ISOPTO ATROPINE) 1 % Drop Place 1 drop into the left eye 3 (three) times daily.      azelastine (ASTELIN) 137 mcg (0.1 %) nasal spray 1 spray (137 mcg total) by Nasal route 2 (two) times daily. 30 mL 0    blood sugar diagnostic (TRUE METRIX GLUCOSE TEST STRIP) Strp 1 strip by Misc.(Non-Drug; Combo Route) route 3 (three) times daily. 100 each 11    blood-glucose meter (TRUE METRIX GLUCOSE METER) Misc Use as instructed 1 each 0    cyanocobalamin (VITAMIN B-12) 1000 MCG tablet Take 100 mcg by mouth once daily.      folic acid (FOLVITE) 1 MG tablet Take 1 mg by mouth once daily.      insulin aspart protamine-insulin aspart (NOVOLOG MIX 70-30FLEXPEN U-100) 100 unit/mL (70-30) InPn pen Inject 22 units into skin before breakfast, 10 units before dinner. On dialysis days, give 12 units before dinner. 15 mL 3    lancets (TRUEPLUS LANCETS) 33 gauge Misc 1 lancet by Misc.(Non-Drug; Combo Route) route 3 (three) times daily. 100 each 11    loratadine (CLARITIN) 10 mg tablet Take 1 tablet (10 mg total) by mouth once daily. 30 tablet 3    metoprolol tartrate (LOPRESSOR) 50 MG tablet Take 1 tablet (50 mg total) by mouth 2 (two) times daily. 180 tablet 11    ondansetron (ZOFRAN-ODT) 4 MG TbDL Take 1 tablet (4 mg total) by mouth every 8 (eight) hours as needed (nausea/vomiting). 12 tablet 0    pen needle, diabetic 32 gauge x 5/32" Ndle 1 each by Misc.(Non-Drug; Combo Route) route 2 (two) times a day. 100 each 11    rosuvastatin (CRESTOR) 20 MG tablet Take 1 " "tablet (20 mg total) by mouth once daily. 90 tablet 3    sevelamer carbonate (RENVELA) 800 mg Tab Take 3 tablets (2,400 mg) by mouth three times a day with meals and 2 tablets (1,600 mg) by mouth with snacks 1170 tablet 3    [DISCONTINUED] isosorbide-hydrALAZINE 20-37.5 mg (BIDIL) 20-37.5 mg Tab Take 1 tablet by mouth 3 (three) times daily. 90 tablet 1     No current facility-administered medications on file prior to visit.       Medication List with Changes/Refills   Current Medications    AMLODIPINE (NORVASC) 5 MG TABLET    Take 5 mg by mouth.    ASPIRIN (ECOTRIN) 81 MG EC TABLET    Take 1 tablet (81 mg total) by mouth once daily.    ATROPINE 1% (ISOPTO ATROPINE) 1 % DROP    Place 1 drop into the left eye 3 (three) times daily.    AZELASTINE (ASTELIN) 137 MCG (0.1 %) NASAL SPRAY    1 spray (137 mcg total) by Nasal route 2 (two) times daily.    BLOOD SUGAR DIAGNOSTIC (TRUE METRIX GLUCOSE TEST STRIP) STRP    1 strip by Misc.(Non-Drug; Combo Route) route 3 (three) times daily.    BLOOD-GLUCOSE METER (TRUE METRIX GLUCOSE METER) MISC    Use as instructed    CYANOCOBALAMIN (VITAMIN B-12) 1000 MCG TABLET    Take 100 mcg by mouth once daily.    FOLIC ACID (FOLVITE) 1 MG TABLET    Take 1 mg by mouth once daily.    INSULIN ASPART PROTAMINE-INSULIN ASPART (NOVOLOG MIX 70-30FLEXPEN U-100) 100 UNIT/ML (70-30) INPN PEN    Inject 22 units into skin before breakfast, 10 units before dinner. On dialysis days, give 12 units before dinner.    LANCETS (TRUEPLUS LANCETS) 33 GAUGE MISC    1 lancet by Misc.(Non-Drug; Combo Route) route 3 (three) times daily.    LORATADINE (CLARITIN) 10 MG TABLET    Take 1 tablet (10 mg total) by mouth once daily.    METOPROLOL TARTRATE (LOPRESSOR) 50 MG TABLET    Take 1 tablet (50 mg total) by mouth 2 (two) times daily.    ONDANSETRON (ZOFRAN-ODT) 4 MG TBDL    Take 1 tablet (4 mg total) by mouth every 8 (eight) hours as needed (nausea/vomiting).    PEN NEEDLE, DIABETIC 32 GAUGE X 5/32" NDLE    1 each by " Misc.(Non-Drug; Combo Route) route 2 (two) times a day.    ROSUVASTATIN (CRESTOR) 20 MG TABLET    Take 1 tablet (20 mg total) by mouth once daily.    SEVELAMER CARBONATE (RENVELA) 800 MG TAB    Take 3 tablets (2,400 mg) by mouth three times a day with meals and 2 tablets (1,600 mg) by mouth with snacks       PSH     Past Surgical History:   Procedure Laterality Date    BREAST LUMPECTOMY Left     benign, when patient was 15 years old    BREAST MASS EXCISION      ,benign, age 15.    CATHETERIZATION OF BOTH LEFT AND RIGHT HEART N/A 11/28/2018    Procedure: CATHETERIZATION, HEART, BOTH LEFT AND RIGHT;  Surgeon: Michelle Holman MD;  Location: [facility] CATH LAB;  Service: Cardiology;  Laterality: N/A;    CATHETERIZATION OF BOTH LEFT AND RIGHT HEART N/A 11/30/2018    Procedure: CATHETERIZATION, HEART, BOTH LEFT AND RIGHT;  Surgeon: Michelle Holman MD;  Location: [facility] CATH LAB;  Service: Cardiology;  Laterality: N/A;    CORONARY ARTERY BYPASS GRAFT      CORONARY ARTERY BYPASS GRAFT (CABG) N/A 7/20/2022    Procedure: CORONARY ARTERY BYPASS GRAFT (CABG);  Surgeon: Sanju Locke MD;  Location: [facility] OR;  Service: Cardiothoracic;  Laterality: N/A;  2-VESSEL PUMP ASSIST WITH EPI-AORTIC ULTRASOUND    ENDOSCOPIC HARVEST OF VEIN Left 7/20/2022    Procedure: SURGICAL PROCUREMENT, VEIN, ENDOSCOPIC;  Surgeon: Sanju Locke MD;  Location: [facility] OR;  Service: Cardiothoracic;  Laterality: Left;    HYSTERECTOMY  1982    INJECTION OF ANESTHETIC AGENT AROUND MULTIPLE INTERCOSTAL NERVES N/A 7/20/2022    Procedure: BLOCK, NERVE, INTERCOSTAL, 2 OR MORE;  Surgeon: Sanju Locke MD;  Location: [facility] OR;  Service: Cardiothoracic;  Laterality: N/A;  PARASTERNAL NERVE BLOCK    INSERTION OF SHUNT      LEFT HEART CATHETERIZATION Left 12/3/2018    Procedure: CATHETERIZATION, HEART, LEFT;  Surgeon: Michelle Holman MD;  Location: [facility] CATH LAB;  Service: Cardiology;  Laterality: Left;  LAD ATHERECTOMY STENT/ FEMORAL APPROACH    LEFT HEART  "CATHETERIZATION Left 2022    Procedure: CATHETERIZATION, HEART, LEFT;  Surgeon: Abhi Sloan MD;  Location: [facility] CATH LAB;  Service: Cardiology;  Laterality: Left;    OOPHORECTOMY      wrist cyst Right 1980s    dorsal wrist cyst        ALL  Review of patient's allergies indicates:   Allergen Reactions    Codeine Swelling    Darvon [propoxyphene] Swelling       SOC     Social History     Tobacco Use    Smoking status: Former     Current packs/day: 0.00     Average packs/day: 1.5 packs/day for 30.0 years (45.0 ttl pk-yrs)     Types: Cigarettes     Start date: 1960     Quit date: 1990     Years since quittin.6    Smokeless tobacco: Former   Substance Use Topics    Alcohol use: No     Alcohol/week: 0.0 standard drinks of alcohol    Drug use: No         FAMILY HX    Family History   Problem Relation Age of Onset    Diabetes Mother     Hyperlipidemia Mother     Hypertension Mother     Heart disease Mother         MI    Muscular dystrophy Son     Cancer Maternal Grandmother         colon            REVIEW OF SYSTEMS   General: This patient is well-developed, well-nourished and appears stated age, well-oriented to person, place and time, and cooperative and pleasant on today's visit  Constitutional: Negative for chills and fever.   Respiratory: Negative for shortness of breath.    Cardiovascular: Negative for chest pain, palpitations, orthopnea  Gastrointestinal: Negative for diarrhea, nausea and vomiting.   Musculoskeletal: Positive for above noted in HPI  Skin: positive for skin and nail changes  Neurological: positive for tingling and sensory changes  Peripheral Vascular: no claudication or cyanosis  Psychiatric/Behavioral: Negative for altered mental status     PHYSICAL EXAM:      Vitals:    23 1030   Weight: 62.1 kg (137 lb)   Height: 5' 4" (1.626 m)   PainSc:   5       General: This patient is well-developed, well-nourished and appears stated age, well-oriented to person, place and time, " and cooperative and pleasant on today's visit    LOWER EXTREMITY  VASCULAR  Diminished pedal pulses b/l  Capillary refill time immediate to the toes.   Feet are warm to the touch. Skin temperature warm to warm from proximally to distally   There are varicosities, telangiectasias noted to bilateral foot and ankle regions.   There are no ecchymoses noted to bilateral foot and ankle regions.   There is gross lower extremity edema.    DERMATOLOGIC  Skin moist with healthy texture and turgor.  There are no open ulcerations, lacerations, or fissures to bilateral foot and ankle regions. There are no signs of infection as there is no erythema, no proximal-extending lymphangiitis, no fluctuance, or crepitus noted on palpation to bilateral foot and ankle regions.   There is no interdigital maceration.   There is hyperkeratotic lesions noted    Incurvated medial border b/l hallux    NEUROLOGIC  Epicritic sensation is diminished.   Achilles and patellar deep tendon reflexes intact  Babinski reflex absent    ORTHOPEDIC/BIOMECHANICAL  Muscle strength AT/EHL/EDL/PT: 5/5; Achilles/Gastroc/Soleus: 5/5; PB/PL: 5/5 Muscle tone is normal.  Ankle joint ROM INTACT DF/PF, non-crepitus  STJ ROM  inv/ev, non crepitus         ASSESSMENT     Encounter Diagnoses   Name Primary?    Ingrown toenail of both feet Yes    Peripheral arterial disease              PLAN  Patient was educated about clinical and imaging findings, and verbalizes understanding of above.     Utilizing sterile toenail clippers I aggressively trimmed the offending nail border approximately 3 mm from its edge and carried the nail plate incision down at an angle in order to wedge out the offending cryptotic portion of the nail plate. The offending border was then removed in toto. The remaining nail was grinded down with an electric  down to nail bed. Minimal blood was drawn. Applied betadine and covered with band-aid. Patient tolerated the procedure well and related  significant relief.      Future Appointments   Date Time Provider Department Center   9/20/2023  4:30 PM Natty Mistry, [facility]   11/15/2023 11:00 AM Natty Mistry, JESSEN[facility]   12/1/2023 10:20 AM Rose Germain MD [id number]   12/5/2023  8:30 AM LABORATORY, [facility] LAB [facility]   12/15/2023  3:20 PM Michelle Holman MD  CARDIO [place]       Report Electronically Signed By:    Anette Montanez DPM   Podiatric Medicine & Surgery  Jonathonsti Vazquez  8/31/2023     Disclaimer:  This note may have been prepared using voice recognition software, it may have not been extensively proofed, as such there could be errors within the text such as sound alike errors.

## 2023-09-07 ENCOUNTER — OFFICE VISIT (OUTPATIENT)
Dept: PRIMARY CARE CLINIC | Facility: CLINIC | Age: 78
End: 2023-09-07
Payer: MEDICARE

## 2023-09-07 ENCOUNTER — HOSPITAL ENCOUNTER (OUTPATIENT)
Dept: RADIOLOGY | Facility: HOSPITAL | Age: 78
Discharge: HOME | End: 2023-09-07
Attending: NURSE PRACTITIONER
Payer: MEDICARE

## 2023-09-07 ENCOUNTER — TELEPHONE (OUTPATIENT)
Dept: PRIMARY CARE CLINIC | Facility: CLINIC | Age: 78
End: 2023-09-07
Payer: MEDICARE

## 2023-09-07 VITALS
TEMPERATURE: 98 F | BODY MASS INDEX: 23.37 KG/M2 | HEIGHT: 64 IN | OXYGEN SATURATION: 99 % | HEART RATE: 64 BPM | SYSTOLIC BLOOD PRESSURE: 144 MMHG | WEIGHT: 136.88 LBS | DIASTOLIC BLOOD PRESSURE: 50 MMHG

## 2023-09-07 DIAGNOSIS — Z95.1 S/P CABG (CORONARY ARTERY BYPASS GRAFT): ICD-10-CM

## 2023-09-07 DIAGNOSIS — M54.50 ACUTE BILATERAL LOW BACK PAIN WITHOUT SCIATICA: Primary | ICD-10-CM

## 2023-09-07 DIAGNOSIS — J90 PLEURAL EFFUSION: ICD-10-CM

## 2023-09-07 DIAGNOSIS — I27.20 PULMONARY HYPERTENSION: ICD-10-CM

## 2023-09-07 DIAGNOSIS — I50.42 CHRONIC COMBINED SYSTOLIC AND DIASTOLIC HEART FAILURE: ICD-10-CM

## 2023-09-07 DIAGNOSIS — N18.6 ESRD (END STAGE RENAL DISEASE): ICD-10-CM

## 2023-09-07 DIAGNOSIS — M54.50 ACUTE BILATERAL LOW BACK PAIN WITHOUT SCIATICA: ICD-10-CM

## 2023-09-07 PROCEDURE — 1159F PR MEDICATION LIST DOCUMENTED IN MEDICAL RECORD: ICD-10-PCS | Mod: HCNC,CPTII,S$GLB, | Performed by: NURSE PRACTITIONER

## 2023-09-07 PROCEDURE — 1157F PR ADVANCE CARE PLAN OR EQUIV PRESENT IN MEDICAL RECORD: ICD-10-PCS | Mod: HCNC,CPTII,S$GLB, | Performed by: NURSE PRACTITIONER

## 2023-09-07 PROCEDURE — 71046 X-RAY EXAM CHEST 2 VIEWS: CPT | Mod: TC,HCNC,FY,PO

## 2023-09-07 PROCEDURE — 3077F PR MOST RECENT SYSTOLIC BLOOD PRESSURE >= 140 MM HG: ICD-10-PCS | Mod: HCNC,CPTII,S$GLB, | Performed by: NURSE PRACTITIONER

## 2023-09-07 PROCEDURE — 99214 PR OFFICE/OUTPT VISIT, EST, LEVL IV, 30-39 MIN: ICD-10-PCS | Mod: HCNC,S$GLB,, | Performed by: NURSE PRACTITIONER

## 2023-09-07 PROCEDURE — 1101F PT FALLS ASSESS-DOCD LE1/YR: CPT | Mod: HCNC,CPTII,S$GLB, | Performed by: NURSE PRACTITIONER

## 2023-09-07 PROCEDURE — 1101F PR PT FALLS ASSESS DOC 0-1 FALLS W/OUT INJ PAST YR: ICD-10-PCS | Mod: HCNC,CPTII,S$GLB, | Performed by: NURSE PRACTITIONER

## 2023-09-07 PROCEDURE — 1159F MED LIST DOCD IN RCRD: CPT | Mod: HCNC,CPTII,S$GLB, | Performed by: NURSE PRACTITIONER

## 2023-09-07 PROCEDURE — 3077F SYST BP >= 140 MM HG: CPT | Mod: HCNC,CPTII,S$GLB, | Performed by: NURSE PRACTITIONER

## 2023-09-07 PROCEDURE — 71046 XR CHEST PA AND LATERAL: ICD-10-PCS | Mod: 26,HCNC,, | Performed by: RADIOLOGY

## 2023-09-07 PROCEDURE — 3288F PR FALLS RISK ASSESSMENT DOCUMENTED: ICD-10-PCS | Mod: HCNC,CPTII,S$GLB, | Performed by: NURSE PRACTITIONER

## 2023-09-07 PROCEDURE — 3288F FALL RISK ASSESSMENT DOCD: CPT | Mod: HCNC,CPTII,S$GLB, | Performed by: NURSE PRACTITIONER

## 2023-09-07 PROCEDURE — 1125F AMNT PAIN NOTED PAIN PRSNT: CPT | Mod: HCNC,CPTII,S$GLB, | Performed by: NURSE PRACTITIONER

## 2023-09-07 PROCEDURE — 99999 PR PBB SHADOW E&M-EST. PATIENT-LVL V: ICD-10-PCS | Mod: PBBFAC,HCNC,, | Performed by: NURSE PRACTITIONER

## 2023-09-07 PROCEDURE — 1125F PR PAIN SEVERITY QUANTIFIED, PAIN PRESENT: ICD-10-PCS | Mod: HCNC,CPTII,S$GLB, | Performed by: NURSE PRACTITIONER

## 2023-09-07 PROCEDURE — 71046 X-RAY EXAM CHEST 2 VIEWS: CPT | Mod: 26,HCNC,, | Performed by: RADIOLOGY

## 2023-09-07 PROCEDURE — 99214 OFFICE O/P EST MOD 30 MIN: CPT | Mod: HCNC,S$GLB,, | Performed by: NURSE PRACTITIONER

## 2023-09-07 PROCEDURE — 99999 PR PBB SHADOW E&M-EST. PATIENT-LVL V: CPT | Mod: PBBFAC,HCNC,, | Performed by: NURSE PRACTITIONER

## 2023-09-07 PROCEDURE — 3078F PR MOST RECENT DIASTOLIC BLOOD PRESSURE < 80 MM HG: ICD-10-PCS | Mod: HCNC,CPTII,S$GLB, | Performed by: NURSE PRACTITIONER

## 2023-09-07 PROCEDURE — 1157F ADVNC CARE PLAN IN RCRD: CPT | Mod: HCNC,CPTII,S$GLB, | Performed by: NURSE PRACTITIONER

## 2023-09-07 PROCEDURE — 3078F DIAST BP <80 MM HG: CPT | Mod: HCNC,CPTII,S$GLB, | Performed by: NURSE PRACTITIONER

## 2023-09-07 RX ORDER — LIDOCAINE 50 MG/G
1 PATCH TOPICAL DAILY
Qty: 15 PATCH | Refills: 0 | Status: CANCELLED | OUTPATIENT
Start: 2023-09-07

## 2023-09-07 RX ORDER — TRAMADOL HYDROCHLORIDE 50 MG/1
50 TABLET ORAL EVERY 6 HOURS PRN
Qty: 30 EACH | Refills: 0 | Status: SHIPPED | OUTPATIENT
Start: 2023-09-07 | End: 2023-09-08 | Stop reason: SDUPTHER

## 2023-09-07 RX ORDER — TRAMADOL HYDROCHLORIDE 50 MG/1
50 TABLET ORAL EVERY 6 HOURS PRN
Qty: 30 EACH | Refills: 0 | Status: CANCELLED | OUTPATIENT
Start: 2023-09-07

## 2023-09-07 RX ORDER — LIDOCAINE 50 MG/G
1 PATCH TOPICAL DAILY
Qty: 15 PATCH | Refills: 0 | Status: SHIPPED | OUTPATIENT
Start: 2023-09-07 | End: 2023-09-08 | Stop reason: SDUPTHER

## 2023-09-07 NOTE — TELEPHONE ENCOUNTER
----- Message from Nidhi Miller NP sent at 9/7/2023 12:44 PM CDT -----  Please let Ms. Monzon know that her CXR did not having any negative findings. No signs of fluid in her lungs.  You were given a Rx of oxygen for home in 2022.  What supplies do you have at home?    Yasmin

## 2023-09-07 NOTE — TELEPHONE ENCOUNTER
Spoke with pt's daughter and gave results and instructions. Pt daughter requested that rx be sent to [id number] [facility].    LOVE

## 2023-09-07 NOTE — TELEPHONE ENCOUNTER
Called patient daughter and notified of the results and recommendations. Patient verbalized understanding, Sonya Woodruff

## 2023-09-08 ENCOUNTER — TELEPHONE (OUTPATIENT)
Dept: NEPHROLOGY | Facility: CLINIC | Age: 78
End: 2023-09-08
Payer: MEDICARE

## 2023-09-08 DIAGNOSIS — Z99.2 ESRD ON DIALYSIS: Primary | ICD-10-CM

## 2023-09-08 DIAGNOSIS — M54.50 ACUTE BILATERAL LOW BACK PAIN WITHOUT SCIATICA: ICD-10-CM

## 2023-09-08 DIAGNOSIS — R06.02 SHORTNESS OF BREATH: ICD-10-CM

## 2023-09-08 DIAGNOSIS — N18.6 ESRD ON DIALYSIS: Primary | ICD-10-CM

## 2023-09-08 RX ORDER — TRAMADOL HYDROCHLORIDE 50 MG/1
50 TABLET ORAL EVERY 6 HOURS PRN
Qty: 30 EACH | Refills: 0 | Status: ON HOLD | OUTPATIENT
Start: 2023-09-08 | End: 2024-03-16

## 2023-09-08 RX ORDER — LIDOCAINE 50 MG/G
1 PATCH TOPICAL DAILY
Qty: 15 PATCH | Refills: 0 | Status: SHIPPED | OUTPATIENT
Start: 2023-09-08 | End: 2023-10-13 | Stop reason: SDUPTHER

## 2023-09-08 NOTE — TELEPHONE ENCOUNTER
----- Message from Eleni Jackson DNP sent at 9/8/2023 10:16 AM CDT -----  Regarding: Pulmonology    GM!     Can we please schedule the patient an apt with pulm for chronic SOB and cough? O put the order in epic. She has HD MWF. her daughters cell is [phone].     Thanks,   Eleni

## 2023-09-12 ENCOUNTER — OFFICE VISIT (OUTPATIENT)
Dept: PULMONOLOGY | Facility: CLINIC | Age: 78
End: 2023-09-12
Payer: MEDICARE

## 2023-09-12 VITALS
HEIGHT: 65 IN | BODY MASS INDEX: 22.73 KG/M2 | WEIGHT: 136.44 LBS | OXYGEN SATURATION: 95 % | SYSTOLIC BLOOD PRESSURE: 142 MMHG | RESPIRATION RATE: 18 BRPM | HEART RATE: 74 BPM | DIASTOLIC BLOOD PRESSURE: 62 MMHG

## 2023-09-12 DIAGNOSIS — K21.9 GERD WITHOUT ESOPHAGITIS: Primary | ICD-10-CM

## 2023-09-12 DIAGNOSIS — R06.02 SHORTNESS OF BREATH: ICD-10-CM

## 2023-09-12 DIAGNOSIS — N18.6 ESRD ON DIALYSIS: ICD-10-CM

## 2023-09-12 DIAGNOSIS — Z99.2 ESRD ON DIALYSIS: ICD-10-CM

## 2023-09-12 PROCEDURE — 3288F PR FALLS RISK ASSESSMENT DOCUMENTED: ICD-10-PCS | Mod: HCNC,CPTII,S$GLB, | Performed by: INTERNAL MEDICINE

## 2023-09-12 PROCEDURE — 3078F DIAST BP <80 MM HG: CPT | Mod: HCNC,CPTII,S$GLB, | Performed by: INTERNAL MEDICINE

## 2023-09-12 PROCEDURE — 1157F ADVNC CARE PLAN IN RCRD: CPT | Mod: HCNC,CPTII,S$GLB, | Performed by: INTERNAL MEDICINE

## 2023-09-12 PROCEDURE — 1101F PR PT FALLS ASSESS DOC 0-1 FALLS W/OUT INJ PAST YR: ICD-10-PCS | Mod: HCNC,CPTII,S$GLB, | Performed by: INTERNAL MEDICINE

## 2023-09-12 PROCEDURE — 3078F PR MOST RECENT DIASTOLIC BLOOD PRESSURE < 80 MM HG: ICD-10-PCS | Mod: HCNC,CPTII,S$GLB, | Performed by: INTERNAL MEDICINE

## 2023-09-12 PROCEDURE — 3288F FALL RISK ASSESSMENT DOCD: CPT | Mod: HCNC,CPTII,S$GLB, | Performed by: INTERNAL MEDICINE

## 2023-09-12 PROCEDURE — 1160F PR REVIEW ALL MEDS BY PRESCRIBER/CLIN PHARMACIST DOCUMENTED: ICD-10-PCS | Mod: HCNC,CPTII,S$GLB, | Performed by: INTERNAL MEDICINE

## 2023-09-12 PROCEDURE — 1159F MED LIST DOCD IN RCRD: CPT | Mod: HCNC,CPTII,S$GLB, | Performed by: INTERNAL MEDICINE

## 2023-09-12 PROCEDURE — 1157F PR ADVANCE CARE PLAN OR EQUIV PRESENT IN MEDICAL RECORD: ICD-10-PCS | Mod: HCNC,CPTII,S$GLB, | Performed by: INTERNAL MEDICINE

## 2023-09-12 PROCEDURE — 3077F PR MOST RECENT SYSTOLIC BLOOD PRESSURE >= 140 MM HG: ICD-10-PCS | Mod: HCNC,CPTII,S$GLB, | Performed by: INTERNAL MEDICINE

## 2023-09-12 PROCEDURE — 99999 PR PBB SHADOW E&M-EST. PATIENT-LVL V: ICD-10-PCS | Mod: PBBFAC,HCNC,, | Performed by: INTERNAL MEDICINE

## 2023-09-12 PROCEDURE — 99214 PR OFFICE/OUTPT VISIT, EST, LEVL IV, 30-39 MIN: ICD-10-PCS | Mod: HCNC,S$GLB,, | Performed by: INTERNAL MEDICINE

## 2023-09-12 PROCEDURE — 1125F PR PAIN SEVERITY QUANTIFIED, PAIN PRESENT: ICD-10-PCS | Mod: HCNC,CPTII,S$GLB, | Performed by: INTERNAL MEDICINE

## 2023-09-12 PROCEDURE — 99214 OFFICE O/P EST MOD 30 MIN: CPT | Mod: HCNC,S$GLB,, | Performed by: INTERNAL MEDICINE

## 2023-09-12 PROCEDURE — 1101F PT FALLS ASSESS-DOCD LE1/YR: CPT | Mod: HCNC,CPTII,S$GLB, | Performed by: INTERNAL MEDICINE

## 2023-09-12 PROCEDURE — 1159F PR MEDICATION LIST DOCUMENTED IN MEDICAL RECORD: ICD-10-PCS | Mod: HCNC,CPTII,S$GLB, | Performed by: INTERNAL MEDICINE

## 2023-09-12 PROCEDURE — 1125F AMNT PAIN NOTED PAIN PRSNT: CPT | Mod: HCNC,CPTII,S$GLB, | Performed by: INTERNAL MEDICINE

## 2023-09-12 PROCEDURE — 3077F SYST BP >= 140 MM HG: CPT | Mod: HCNC,CPTII,S$GLB, | Performed by: INTERNAL MEDICINE

## 2023-09-12 PROCEDURE — 1160F RVW MEDS BY RX/DR IN RCRD: CPT | Mod: HCNC,CPTII,S$GLB, | Performed by: INTERNAL MEDICINE

## 2023-09-12 PROCEDURE — 99999 PR PBB SHADOW E&M-EST. PATIENT-LVL V: CPT | Mod: PBBFAC,HCNC,, | Performed by: INTERNAL MEDICINE

## 2023-09-12 RX ORDER — PANTOPRAZOLE SODIUM 40 MG/1
40 TABLET, DELAYED RELEASE ORAL DAILY
Qty: 90 TABLET | Refills: 3 | Status: ON HOLD | OUTPATIENT
Start: 2023-09-12 | End: 2024-03-16

## 2023-09-12 NOTE — PROGRESS NOTES
Subjective:      Patient ID: Avril Monzon is a 78 y.o. female.    Chief Complaint: Shortness of Breath    Shortness of Breath        78-year-old female with end-stage renal disease on dialysis, hypertension, chronic diastolic failure with secondary pulmonary hypertension, reflux that is currently untreated who is here for evaluation of shortness of breath, cough and nocturnal wheeze.  She states that about a month ago she was having worsening shortness of breath.  She would a chest radiograph at that time that showed bilateral pleural effusions and vascular congestion.  Subsequent to that she had increase ultrafiltration schedule a dialysis and has since improved significantly with her shortness of breath.  She had a chest radiograph done 5 days ago that showed resolution of her effusions and pulmonary congestion.  She continues to have a nocturnal cough with occasional audible wheeze.  She has overt reflux throughout the day every day.  This is untreated.  She previously was on Nexium but this was discontinued for reasons she does not remember.  Otherwise no complaints.  She is a former smoker but quit about 33 years ago.  No underlying asthma or COPD that she is aware of.    Past Medical History:   Diagnosis Date    Acute hypoxemic respiratory failure 11/26/2018    Anemia     Arthritis     back, hand, knees    Back pain     Cataract     CKD stage G4/A3, GFR 15 - 29 and albumin creatinine ratio >300 mg/g     GFR 40% Jan 2014 and 33% ub 3/2014 (BRG)    Coronary artery disease involving native coronary artery of native heart 11/30/2018    Diabetic retinopathy     DM (diabetes mellitus) type II controlled, neurological manifestation     Exudative age-related macular degeneration of left eye with active choroidal neovascularization 2/5/2019    GERD (gastroesophageal reflux disease)     Glaucoma     Heart failure     Hyperlipidemia     Hypertension     Non-ST elevation MI (NSTEMI) 11/28/2018    NSTEMI (non-ST elevated  myocardial infarction) 11/27/2018    Peripheral neuropathy     Polyneuropathy     Proteinuria     >6 mo     Seizures     BRG 1/2014 -dick CT NRI showed small vessel    Skin ulcer of toe of right foot with fat layer exposed 10/14/2022    Stroke 2012,2014    Tobacco dependence     Type 2 diabetes with peripheral circulatory disorder, controlled      Past Surgical History:   Procedure Laterality Date    BREAST LUMPECTOMY Left     benign, when patient was 15 years old    BREAST MASS EXCISION      ,benign, age 15.    CATHETERIZATION OF BOTH LEFT AND RIGHT HEART N/A 11/28/2018    Procedure: CATHETERIZATION, HEART, BOTH LEFT AND RIGHT;  Surgeon: Michelle Holman MD;  Location: [facility] CATH LAB;  Service: Cardiology;  Laterality: N/A;    CATHETERIZATION OF BOTH LEFT AND RIGHT HEART N/A 11/30/2018    Procedure: CATHETERIZATION, HEART, BOTH LEFT AND RIGHT;  Surgeon: Michelle Holman MD;  Location: [facility] CATH LAB;  Service: Cardiology;  Laterality: N/A;    CORONARY ARTERY BYPASS GRAFT      CORONARY ARTERY BYPASS GRAFT (CABG) N/A 7/20/2022    Procedure: CORONARY ARTERY BYPASS GRAFT (CABG);  Surgeon: Sanju Locke MD;  Location: [facility] OR;  Service: Cardiothoracic;  Laterality: N/A;  2-VESSEL PUMP ASSIST WITH EPI-AORTIC ULTRASOUND    ENDOSCOPIC HARVEST OF VEIN Left 7/20/2022    Procedure: SURGICAL PROCUREMENT, VEIN, ENDOSCOPIC;  Surgeon: Sanju Locke MD;  Location: [facility] OR;  Service: Cardiothoracic;  Laterality: Left;    HYSTERECTOMY  1982    INJECTION OF ANESTHETIC AGENT AROUND MULTIPLE INTERCOSTAL NERVES N/A 7/20/2022    Procedure: BLOCK, NERVE, INTERCOSTAL, 2 OR MORE;  Surgeon: Sanju Locke MD;  Location: [facility] OR;  Service: Cardiothoracic;  Laterality: N/A;  PARASTERNAL NERVE BLOCK    INSERTION OF SHUNT      LEFT HEART CATHETERIZATION Left 12/3/2018    Procedure: CATHETERIZATION, HEART, LEFT;  Surgeon: Michelle Holman MD;  Location: [facility] CATH LAB;  Service: Cardiology;  Laterality: Left;  LAD ATHERECTOMY STENT/ FEMORAL  "APPROACH    LEFT HEART CATHETERIZATION Left 2022    Procedure: CATHETERIZATION, HEART, LEFT;  Surgeon: Abhi Sloan MD;  Location: [facility] CATH LAB;  Service: Cardiology;  Laterality: Left;    OOPHORECTOMY      wrist cyst Right 1980s    dorsal wrist cyst     Social History     Tobacco Use    Smoking status: Former     Current packs/day: 0.00     Average packs/day: 1.5 packs/day for 30.0 years (45.0 ttl pk-yrs)     Types: Cigarettes     Start date: 1960     Quit date: 1990     Years since quittin.6    Smokeless tobacco: Former   Substance Use Topics    Alcohol use: No     Alcohol/week: 0.0 standard drinks of alcohol    Drug use: No     Family History   Problem Relation Age of Onset    Diabetes Mother     Hyperlipidemia Mother     Hypertension Mother     Heart disease Mother         MI    Muscular dystrophy Son     Cancer Maternal Grandmother         colon       Review of Systems   Respiratory:  Positive for shortness of breath.     as per hPI otherwise negative    Objective:     Physical Exam   Constitutional: She is oriented to person, place, and time. She appears well-developed. No distress.   HENT:   Head: Normocephalic.   Cardiovascular: Normal rate and regular rhythm.   Murmur heard.  Pulmonary/Chest: Normal expansion, symmetric chest wall expansion, effort normal and breath sounds normal.   Musculoskeletal:      Cervical back: Neck supple.   Neurological: She is alert and oriented to person, place, and time.   Psychiatric: She has a normal mood and affect.   Nursing note and vitals reviewed.          2023    10:52 AM 2023     8:28 AM 2023    10:30 AM 2023     2:39 PM 2023     1:52 PM 8/10/2023    11:41 AM 8/10/2023     7:34 AM   Pulmonary Function Tests   SpO2 95 % 99 %  98 % 98 % 94 % 95 %   Height 5' 5" (1.651 m) 5' 4" (1.626 m) 5' 4" (1.626 m) 5' 4" (1.626 m) 5' 4" (1.626 m)     Weight 61.9 kg (136 lb 7.4 oz) 62.1 kg (136 lb 14.4 oz) 62.1 kg (137 lb) 62.5 kg (137 " lb 12.8 oz) 62.9 kg (138 lb 10.7 oz)     BMI (Calculated) 22.7 23.5 23.5 23.6 23.8          Assessment:     1. GERD without esophagitis    2. ESRD on dialysis    3. Shortness of breath      I personally reviewed chest radiograph images obtained in August in September.  My interpretation is as per history of present illness      Plan:     Dyspnea improving with increased ultrafiltration and fluid   Removal  Continue dialysis as scheduled and follow up with Nephrology  Blood pressure control  Nocturnal cough and wheeze most likely due to reflux  We will start Protonix once daily  Otherwise continue medications as current  We will see back p.r.n.  Discussed the above with the patient who voiced understanding and agreement with the plan

## 2023-09-14 ENCOUNTER — HOSPITAL ENCOUNTER (OUTPATIENT)
Dept: RADIOLOGY | Facility: HOSPITAL | Age: 78
Discharge: HOME | End: 2023-09-14
Attending: NURSE PRACTITIONER
Payer: MEDICARE

## 2023-09-14 ENCOUNTER — OFFICE VISIT (OUTPATIENT)
Dept: PRIMARY CARE CLINIC | Facility: CLINIC | Age: 78
End: 2023-09-14
Payer: MEDICARE

## 2023-09-14 ENCOUNTER — TELEPHONE (OUTPATIENT)
Dept: PRIMARY CARE CLINIC | Facility: CLINIC | Age: 78
End: 2023-09-14

## 2023-09-14 VITALS
HEART RATE: 62 BPM | SYSTOLIC BLOOD PRESSURE: 142 MMHG | HEIGHT: 65 IN | OXYGEN SATURATION: 95 % | BODY MASS INDEX: 22.85 KG/M2 | DIASTOLIC BLOOD PRESSURE: 60 MMHG | TEMPERATURE: 98 F | WEIGHT: 137.13 LBS

## 2023-09-14 DIAGNOSIS — S40.012A CONTUSION OF LEFT SHOULDER, INITIAL ENCOUNTER: ICD-10-CM
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
## 2023-09-14 DIAGNOSIS — M54.50 ACUTE BILATERAL LOW BACK PAIN WITHOUT SCIATICA: ICD-10-CM
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
## 2023-09-14 DIAGNOSIS — I50.42 CHRONIC COMBINED SYSTOLIC AND DIASTOLIC HEART FAILURE: ICD-10-CM

## 2023-09-14 PROBLEM — R55 SYNCOPE: Status: ACTIVE | Noted: 2019-11-30

## 2023-09-14 PROBLEM — I63.9 CEREBROVASCULAR ACCIDENT: Status: ACTIVE | Noted: 2023-09-14

## 2023-09-14 PROBLEM — J90 BILATERAL PLEURAL EFFUSION: Status: ACTIVE | Noted: 2022-09-18

## 2023-09-14 PROBLEM — D64.9 ANEMIA: Status: ACTIVE | Noted: 2022-09-18

## 2023-09-14 PROBLEM — I10 HYPERTENSIVE DISORDER: Status: ACTIVE | Noted: 2023-09-14

## 2023-09-14 PROCEDURE — 1125F AMNT PAIN NOTED PAIN PRSNT: CPT | Mod: HCNC,CPTII,S$GLB, | Performed by: NURSE PRACTITIONER

## 2023-09-14 PROCEDURE — 73030 X-RAY EXAM OF SHOULDER: CPT | Mod: TC,HCNC,FY,PO,LT

## 2023-09-14 PROCEDURE — 1125F PR PAIN SEVERITY QUANTIFIED, PAIN PRESENT: ICD-10-PCS | Mod: HCNC,CPTII,S$GLB, | Performed by: NURSE PRACTITIONER

## 2023-09-14 PROCEDURE — 99999 PR PBB SHADOW E&M-EST. PATIENT-LVL V: ICD-10-PCS | Mod: PBBFAC,HCNC,, | Performed by: NURSE PRACTITIONER

## 2023-09-14 PROCEDURE — 1159F MED LIST DOCD IN RCRD: CPT | Mod: HCNC,CPTII,S$GLB, | Performed by: NURSE PRACTITIONER

## 2023-09-14 PROCEDURE — 1157F PR ADVANCE CARE PLAN OR EQUIV PRESENT IN MEDICAL RECORD: ICD-10-PCS | Mod: HCNC,CPTII,S$GLB, | Performed by: NURSE PRACTITIONER

## 2023-09-14 PROCEDURE — 3078F DIAST BP <80 MM HG: CPT | Mod: HCNC,CPTII,S$GLB, | Performed by: NURSE PRACTITIONER

## 2023-09-14 PROCEDURE — 3288F PR FALLS RISK ASSESSMENT DOCUMENTED: ICD-10-PCS | Mod: HCNC,CPTII,S$GLB, | Performed by: NURSE PRACTITIONER

## 2023-09-14 PROCEDURE — 3077F PR MOST RECENT SYSTOLIC BLOOD PRESSURE >= 140 MM HG: ICD-10-PCS | Mod: HCNC,CPTII,S$GLB, | Performed by: NURSE PRACTITIONER

## 2023-09-14 PROCEDURE — 1159F PR MEDICATION LIST DOCUMENTED IN MEDICAL RECORD: ICD-10-PCS | Mod: HCNC,CPTII,S$GLB, | Performed by: NURSE PRACTITIONER

## 2023-09-14 PROCEDURE — 3288F FALL RISK ASSESSMENT DOCD: CPT | Mod: HCNC,CPTII,S$GLB, | Performed by: NURSE PRACTITIONER

## 2023-09-14 PROCEDURE — 73030 XR SHOULDER COMPLETE 2 OR MORE VIEWS LEFT: ICD-10-PCS | Mod: 26,HCNC,LT, | Performed by: RADIOLOGY

## 2023-09-14 PROCEDURE — 1100F PR PT FALLS ASSESS DOC 2+ FALLS/FALL W/INJURY/YR: ICD-10-PCS | Mod: HCNC,CPTII,S$GLB, | Performed by: NURSE PRACTITIONER

## 2023-09-14 PROCEDURE — 3077F SYST BP >= 140 MM HG: CPT | Mod: HCNC,CPTII,S$GLB, | Performed by: NURSE PRACTITIONER

## 2023-09-14 PROCEDURE — 99214 OFFICE O/P EST MOD 30 MIN: CPT | Mod: HCNC,S$GLB,, | Performed by: NURSE PRACTITIONER

## 2023-09-14 PROCEDURE — 1100F PTFALLS ASSESS-DOCD GE2>/YR: CPT | Mod: HCNC,CPTII,S$GLB, | Performed by: NURSE PRACTITIONER

## 2023-09-14 PROCEDURE — 1157F ADVNC CARE PLAN IN RCRD: CPT | Mod: HCNC,CPTII,S$GLB, | Performed by: NURSE PRACTITIONER

## 2023-09-14 PROCEDURE — 3078F PR MOST RECENT DIASTOLIC BLOOD PRESSURE < 80 MM HG: ICD-10-PCS | Mod: HCNC,CPTII,S$GLB, | Performed by: NURSE PRACTITIONER

## 2023-09-14 PROCEDURE — 99214 PR OFFICE/OUTPT VISIT, EST, LEVL IV, 30-39 MIN: ICD-10-PCS | Mod: HCNC,S$GLB,, | Performed by: NURSE PRACTITIONER

## 2023-09-14 PROCEDURE — 99999 PR PBB SHADOW E&M-EST. PATIENT-LVL V: CPT | Mod: PBBFAC,HCNC,, | Performed by: NURSE PRACTITIONER

## 2023-09-14 PROCEDURE — 73030 X-RAY EXAM OF SHOULDER: CPT | Mod: 26,HCNC,LT, | Performed by: RADIOLOGY

## 2023-09-14 NOTE — ASSESSMENT & PLAN NOTE
SOB has resolved  Increased ultrafiltration during dialysis  F/U with pulmonology prn  F/U PCP  Continue weekly dialysis

## 2023-09-14 NOTE — TELEPHONE ENCOUNTER
Spoke with pt and gave her results of xray and instructions for treatment as advised.    Thanks  LOVE

## 2023-09-14 NOTE — PROGRESS NOTES
Avril Monzon  09/14/2023  [id number]    Rose Germain MD  Patient Care Team:  Rose Germain MD as PCP - General (Internal Medicine)  Conchita Garcia RD, CDE as Dietitian (Diabetes)  Debbie Wagner RD (Inactive) as Dietitian (Nutrition)  Angela Hanson MD as Consulting Physician (Nephrology)  Demetrio Mejia MD as Consulting Physician (Cardiology)  Conchita Garcia RD, CDE as Diabetes Educator (Diabetes)  Alina Bauer DPM as Consulting Physician (Podiatry)  Anette Montanez DPM as Consulting Physician (Podiatry)  JADIEL Coello MD as Consulting Physician (Ophthalmology)  Debbie Lopez, RN as Outpatient       [facility]      Chief Complaint:  Chief Complaint   Patient presents with    [facility]    Follow-up     1 week f/u       History of Present Illness:  Returns today for follow up for SOB and lower back pain. Had a fall this AM.   SOB has improved - has not missed dialysis treatments. Visit with Pulmonology 1 week ago. Advised she was stable, CXR had improved, no further reports of SOB, ALVARADO, cough or wheezing. Has not been wearing oxygen while having HD treatments.   6 minute walk test and home oxygen was not discussed.   Dyspnea improving with increased ultrafiltration and fluid removal. Protonix was prescribed for GERD symptoms and reports of cough and wheezing at night.     Pt states her lumbago has declined with use of lidocaine patch and use of tramadol  She is using both intermittently.    Had fall this AM - stumbled by bed and fell forward she was lying on her left arm. Denies LOC. Reports pain to anterior left shoulder with movement.       The following were reviewed: Active problem list, medication list, allergies, family history, social history, and Health Maintenance.     History:  Past Medical History:   Diagnosis Date    Acute hypoxemic respiratory failure 11/26/2018    Anemia     Arthritis     back, hand, knees    Back pain      Cataract     CKD stage G4/A3, GFR 15 - 29 and albumin creatinine ratio >300 mg/g     GFR 40% Jan 2014 and 33% ub 3/2014 (BRG)    Coronary artery disease involving native coronary artery of native heart 11/30/2018    Diabetic retinopathy     DM (diabetes mellitus) type II controlled, neurological manifestation     Exudative age-related macular degeneration of left eye with active choroidal neovascularization 2/5/2019    GERD (gastroesophageal reflux disease)     Glaucoma     Heart failure     Hyperlipidemia     Hypertension     Non-ST elevation MI (NSTEMI) 11/28/2018    NSTEMI (non-ST elevated myocardial infarction) 11/27/2018    Peripheral neuropathy     Polyneuropathy     Proteinuria     >6 mo     Seizures     BRG 1/2014 -dick CT NRI showed small vessel    Skin ulcer of toe of right foot with fat layer exposed 10/14/2022    Stroke 2012,2014    Tobacco dependence     Type 2 diabetes with peripheral circulatory disorder, controlled      Past Surgical History:   Procedure Laterality Date    BREAST LUMPECTOMY Left     benign, when patient was 15 years old    BREAST MASS EXCISION      ,benign, age 15.    CATHETERIZATION OF BOTH LEFT AND RIGHT HEART N/A 11/28/2018    Procedure: CATHETERIZATION, HEART, BOTH LEFT AND RIGHT;  Surgeon: Michelle Holman MD;  Location: [facility] CATH LAB;  Service: Cardiology;  Laterality: N/A;    CATHETERIZATION OF BOTH LEFT AND RIGHT HEART N/A 11/30/2018    Procedure: CATHETERIZATION, HEART, BOTH LEFT AND RIGHT;  Surgeon: Michelle Holman MD;  Location: [facility] CATH LAB;  Service: Cardiology;  Laterality: N/A;    CORONARY ARTERY BYPASS GRAFT      CORONARY ARTERY BYPASS GRAFT (CABG) N/A 7/20/2022    Procedure: CORONARY ARTERY BYPASS GRAFT (CABG);  Surgeon: Sanju Locke MD;  Location: [facility] OR;  Service: Cardiothoracic;  Laterality: N/A;  2-VESSEL PUMP ASSIST WITH EPI-AORTIC ULTRASOUND    ENDOSCOPIC HARVEST OF VEIN Left 7/20/2022    Procedure: SURGICAL PROCUREMENT, VEIN, ENDOSCOPIC;  Surgeon: Sanju  SANDRITA Locke MD;  Location: [facility] OR;  Service: Cardiothoracic;  Laterality: Left;    HYSTERECTOMY  1982    INJECTION OF ANESTHETIC AGENT AROUND MULTIPLE INTERCOSTAL NERVES N/A 7/20/2022    Procedure: BLOCK, NERVE, INTERCOSTAL, 2 OR MORE;  Surgeon: Sanju Locke MD;  Location: [facility] OR;  Service: Cardiothoracic;  Laterality: N/A;  PARASTERNAL NERVE BLOCK    INSERTION OF SHUNT      LEFT HEART CATHETERIZATION Left 12/3/2018    Procedure: CATHETERIZATION, HEART, LEFT;  Surgeon: Michelle Holman MD;  Location: [facility] CATH LAB;  Service: Cardiology;  Laterality: Left;  LAD ATHERECTOMY STENT/ FEMORAL APPROACH    LEFT HEART CATHETERIZATION Left 7/13/2022    Procedure: CATHETERIZATION, HEART, LEFT;  Surgeon: Abhi Sloan MD;  Location: [facility] CATH LAB;  Service: Cardiology;  Laterality: Left;    OOPHORECTOMY      wrist cyst Right 1980s    dorsal wrist cyst     Family History   Problem Relation Age of Onset    Diabetes Mother     Hyperlipidemia Mother     Hypertension Mother     Heart disease Mother         MI    Muscular dystrophy Son     Cancer Maternal Grandmother         colon     Patient Active Problem List   Diagnosis    Constipation    Hypertension associated with diabetes    ESRD (end stage renal disease)    Proteinuria    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, with long-term current use of insulin    Cataracta brunescens of right eye    Bilateral carotid artery disease    Aortic atherosclerosis    Type 2 diabetes mellitus with circulatory disorder, with long-term current use of insulin    History of CVA (cerebrovascular accident)    Hyperparathyroidism    Vitamin D deficiency    DDD (degenerative disc disease), lumbar    Anemia in chronic kidney disease, on chronic dialysis    Iron deficiency anemia due to chronic blood loss    Hyperlipidemia associated with type 2 diabetes mellitus    Pulmonary hypertension    CAD (coronary artery disease)    Exudative age-related macular  degeneration of left eye with active choroidal neovascularization    Non-rheumatic mitral regurgitation    Non-rheumatic tricuspid valve insufficiency    Age-related nuclear cataract of left eye    DM type 2 with diabetic peripheral neuropathy    Unspecified inflammatory spondylopathy, lumbar region    Convulsions    Hemiplegia    Other specified complication of vascular prosthetic devices, implants and grafts, initial encounter    Chronic combined systolic and diastolic heart failure    Hyperkalemia    Metabolic acidosis    S/P CABG (coronary artery bypass graft)    COVID    Skin ulcer of toe of right foot with fat layer exposed    Type 2 diabetes mellitus with diabetic peripheral angiopathy without gangrene, with long-term current use of insulin    Allergic rhinitis    Other reduced mobility    Nausea & vomiting    Dysuria    Debility    Pleural effusion    Acute bilateral low back pain without sciatica    Anemia    Cerebrovascular accident    Hypertensive disorder    Bilateral pleural effusion    Syncope    Contusion of left shoulder    Fall     Review of patient's allergies indicates:   Allergen Reactions    Codeine Swelling    Darvon [propoxyphene] Swelling    Atorvastatin      Muscle twitching       Medications:  Current Outpatient Medications on File Prior to Visit   Medication Sig Dispense Refill    amLODIPine (NORVASC) 5 MG tablet Take 5 mg by mouth.      aspirin (ECOTRIN) 81 MG EC tablet Take 1 tablet (81 mg total) by mouth once daily. 90 tablet 6    atropine 1% (ISOPTO ATROPINE) 1 % Drop Place 1 drop into the left eye 3 (three) times daily.      azelastine (ASTELIN) 137 mcg (0.1 %) nasal spray 1 spray (137 mcg total) by Nasal route 2 (two) times daily. 30 mL 0    blood sugar diagnostic (TRUE METRIX GLUCOSE TEST STRIP) Strp 1 strip by Misc.(Non-Drug; Combo Route) route 3 (three) times daily. 100 each 11    blood-glucose meter (TRUE METRIX GLUCOSE METER) Misc Use as instructed 1 each 0    cyanocobalamin  "(VITAMIN B-12) 1000 MCG tablet Take 100 mcg by mouth once daily.      folic acid (FOLVITE) 1 MG tablet Take 1 mg by mouth once daily.      insulin aspart protamine-insulin aspart (NOVOLOG MIX 70-30FLEXPEN U-100) 100 unit/mL (70-30) InPn pen Inject 22 units into skin before breakfast, 10 units before dinner. On dialysis days, give 12 units before dinner. 15 mL 3    lancets (TRUEPLUS LANCETS) 33 gauge Misc 1 lancet by Misc.(Non-Drug; Combo Route) route 3 (three) times daily. 100 each 11    LIDOcaine (LIDODERM) 5 % Place 1 patch onto the skin once daily. 15 patch 0    loratadine (CLARITIN) 10 mg tablet Take 1 tablet (10 mg total) by mouth once daily. 30 tablet 3    metoprolol tartrate (LOPRESSOR) 50 MG tablet Take 1 tablet (50 mg total) by mouth 2 (two) times daily. 180 tablet 11    ondansetron (ZOFRAN-ODT) 4 MG TbDL Take 1 tablet (4 mg total) by mouth every 8 (eight) hours as needed (nausea/vomiting). 12 tablet 0    pantoprazole (PROTONIX) 40 MG tablet Take 1 tablet (40 mg total) by mouth once daily. 90 tablet 3    pen needle, diabetic 32 gauge x 5/32" Ndle 1 each by Misc.(Non-Drug; Combo Route) route 2 (two) times a day. 100 each 11    rosuvastatin (CRESTOR) 20 MG tablet Take 1 tablet (20 mg total) by mouth once daily. 90 tablet 3    sevelamer carbonate (RENVELA) 800 mg Tab Take 3 tablets (2,400 mg) by mouth three times a day with meals and 2 tablets (1,600 mg) by mouth with snacks 1170 tablet 3    traMADoL (ULTRAM) 50 mg tablet Take 1 tablet (50 mg total) by mouth every 6 (six) hours as needed for Pain. 30 each 0    [DISCONTINUED] isosorbide-hydrALAZINE 20-37.5 mg (BIDIL) 20-37.5 mg Tab Take 1 tablet by mouth 3 (three) times daily. 90 tablet 1     No current facility-administered medications on file prior to visit.       Medications have been reviewed and reconciled with patient at visit today.      Exam:  Vitals:    09/14/23 1101   BP: (!) 142/60   Pulse: 62   Temp: 98.4 °F (36.9 °C)     Weight: 62.2 kg (137 lb 1.6 " oz)   Body mass index is 22.81 kg/m².      BP Readings from Last 3 Encounters:   09/14/23 (!) 142/60   09/12/23 (!) 142/62   09/07/23 (!) 144/50     Wt Readings from Last 3 Encounters:   09/14/23 1101 62.2 kg (137 lb 1.6 oz)   09/12/23 1052 61.9 kg (136 lb 7.4 oz)   09/07/23 0828 62.1 kg (136 lb 14.4 oz)        REVIEW OF SYSTEMS  Review of Systems   Constitutional:  Negative for chills, fever and malaise/fatigue.   HENT:  Negative for congestion, ear pain and sore throat.    Respiratory:  Negative for cough, shortness of breath and wheezing.    Cardiovascular:  Negative for chest pain, palpitations and leg swelling.   Gastrointestinal:  Negative for abdominal pain, diarrhea, nausea and vomiting.   Musculoskeletal:  Positive for falls and joint pain. Negative for back pain, myalgias and neck pain.   Neurological:  Negative for dizziness, focal weakness and headaches.   Psychiatric/Behavioral:  Negative for depression. The patient is not nervous/anxious.        Physical Exam  Vitals reviewed.   Constitutional:       General: She is not in acute distress.     Appearance: Normal appearance.   HENT:      Head: Normocephalic and atraumatic.      Nose: Nose normal.      Mouth/Throat:      Mouth: Mucous membranes are moist.   Eyes:      Conjunctiva/sclera: Conjunctivae normal.   Cardiovascular:      Rate and Rhythm: Normal rate and regular rhythm.   Pulmonary:      Effort: Pulmonary effort is normal. No respiratory distress.      Breath sounds: Normal breath sounds.   Abdominal:      Palpations: Abdomen is soft.      Tenderness: There is no abdominal tenderness.   Musculoskeletal:         General: No swelling or deformity.      Left shoulder: Tenderness present. No swelling, deformity or effusion. Decreased range of motion. Normal strength. Normal pulse.        Arms:       Cervical back: Normal range of motion and neck supple.      Right lower leg: No edema.      Left lower leg: No edema.   Skin:     General: Skin is warm  and dry.   Neurological:      Mental Status: She is alert and oriented to person, place, and time. Mental status is at baseline.   Psychiatric:         Mood and Affect: Mood normal.         Thought Content: Thought content normal.         Laboratory Reviewed:     Lab Results   Component Value Date    WBC 5.87 08/10/2023    HGB 8.8 (L) 08/10/2023    HCT 27.3 (L) 08/10/2023     08/10/2023    CHOL 171 07/13/2022    TRIG 46 07/13/2022    HDL 59 07/13/2022    ALT 5 (L) 08/10/2023    AST 9 (L) 08/10/2023     08/10/2023    K 4.6 08/10/2023    CL 99 08/10/2023    CREATININE 8.4 (H) 08/10/2023    BUN 51 (H) 08/10/2023    CO2 22 (L) 08/10/2023    TSH 1.686 08/04/2023    INR 1.0 08/07/2022    HGBA1C 6.6 (H) 04/04/2023       Screening or Prevention Patient's value Goal Complete/Controlled?   HgA1C Testing and Control   Lab Results   Component Value Date    HGBA1C 6.6 (H) 04/04/2023      Annually/Less than 8% Yes   Lipid profile : 06/28/2023 Annually No   LDL control Lab Results   Component Value Date    LDLCALC 102.8 07/13/2022    Annually/Less than 100 mg/dl  No   Nephropathy screening Lab Results   Component Value Date    LABMICR 1555.0 06/13/2018     Lab Results   Component Value Date    PROTEINUA 3+ (A) 08/26/2022    Annually No   Blood pressure BP Readings from Last 1 Encounters:   09/14/23 (!) 142/60    Less than 140/90 No   Dilated retinal exam : 09/01/2022 Annually No   Foot exam   : 03/08/2022 Annually No       Health Maintenance  Health Maintenance Topics with due status: Not Due       Topic Last Completion Date    TETANUS VACCINE 12/12/2021    Lipid Panel 06/28/2023     Health Maintenance Due   Topic Date Due    Shingles Vaccine (1 of 2) Never done    DEXA Scan  Never done    COVID-19 Vaccine (4 - Moderna series) 02/25/2022    Foot Exam  03/08/2023    Influenza Vaccine (1) 09/01/2023    Eye Exam  09/01/2023    Hemoglobin A1c  10/04/2023       Assessment and Plan:  1. Fall, initial encounter  -     X-Ray  Shoulder 2 or More Views Left; Future; Expected date: 09/14/2023    2. Contusion of left shoulder, initial encounter  Assessment & Plan:  Fall with reports of left anterior shoulder pain  Xray pending  Tylenol    Orders:  -     X-Ray Shoulder 2 or More Views Left; Future; Expected date: 09/14/2023    3. Acute bilateral low back pain without sciatica    4. Chronic combined systolic and diastolic heart failure  Assessment & Plan:  SOB has resolved  Increased ultrafiltration during dialysis  F/U with pulmonology prn  F/U PCP  Continue weekly dialysis                   -Patient's lab results were reviewed and discussed with patient  -Treatment options and alternatives were discussed with the patient. Patient expressed understanding. Patient was given the opportunity to ask questions and be an active participant in their medical care. Patient had no further questions or concerns at this time.         Future Appointments   Date Time Provider Department Center   9/14/2023 12:00 PM [facility] X[facility]1 [facility] [address]   9/20/2023  4:30 PM Natty Mistry NP [facility] [place] [facility]   11/15/2023 11:00 AM Natty Mistry NP [facility]   12/1/2023  9:20 AM Nidhi Miller NP [facility]   12/1/2023 10:20 AM Rose Germain MD [facility]   12/5/2023  8:30 AM LABORATORY, [facility]   12/15/2023  3:20 PM Michelle Holman MD [facility] C[facility]IO [facility]          After visit summary printed and given to patient upon discharge.  Patient goals and care plan are included in After visit summary.    Total medical decision making time was 38 min.    The following issues were discussed: The primary encounter diagnosis was Fall, initial encounter. Diagnoses of Contusion of left shoulder, initial encounter, Acute bilateral low back pain without sciatica, and Chronic combined systolic and diastolic heart failure were also pertinent to this visit.    Health maintenance needs, recent  test results and goals of care discussed with pt and questions answered.           ADEBAYO Bagley, NP-C  [facility] [place] [address]

## 2023-09-15 ENCOUNTER — OUTPATIENT CASE MANAGEMENT (OUTPATIENT)
Dept: ADMINISTRATIVE | Facility: OTHER | Age: 78
End: 2023-09-15
Payer: MEDICARE

## 2023-09-22 ENCOUNTER — OFFICE VISIT (OUTPATIENT)
Dept: PRIMARY CARE CLINIC | Facility: CLINIC | Age: 78
End: 2023-09-22
Payer: MEDICARE

## 2023-09-22 VITALS
OXYGEN SATURATION: 98 % | BODY MASS INDEX: 22.57 KG/M2 | HEIGHT: 65 IN | WEIGHT: 135.5 LBS | TEMPERATURE: 98 F | DIASTOLIC BLOOD PRESSURE: 48 MMHG | HEART RATE: 68 BPM | SYSTOLIC BLOOD PRESSURE: 144 MMHG

## 2023-09-22 DIAGNOSIS — E11.59 HYPERTENSION ASSOCIATED WITH DIABETES: ICD-10-CM

## 2023-09-22 DIAGNOSIS — S43.402A SPRAIN OF LEFT SHOULDER, UNSPECIFIED SHOULDER SPRAIN TYPE, INITIAL ENCOUNTER: ICD-10-CM

## 2023-09-22 DIAGNOSIS — I15.2 HYPERTENSION ASSOCIATED WITH DIABETES: ICD-10-CM
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
## 2023-09-22 PROCEDURE — G0008 ADMIN INFLUENZA VIRUS VAC: HCPCS | Mod: HCNC,S$GLB,, | Performed by: NURSE PRACTITIONER

## 2023-09-22 PROCEDURE — 3288F FALL RISK ASSESSMENT DOCD: CPT | Mod: HCNC,CPTII,S$GLB, | Performed by: NURSE PRACTITIONER

## 2023-09-22 PROCEDURE — 3288F PR FALLS RISK ASSESSMENT DOCUMENTED: ICD-10-PCS | Mod: HCNC,CPTII,S$GLB, | Performed by: NURSE PRACTITIONER

## 2023-09-22 PROCEDURE — G0008 FLU VACCINE - QUADRIVALENT - ADJUVANTED: ICD-10-PCS | Mod: HCNC,S$GLB,, | Performed by: NURSE PRACTITIONER

## 2023-09-22 PROCEDURE — 1157F ADVNC CARE PLAN IN RCRD: CPT | Mod: HCNC,CPTII,S$GLB, | Performed by: NURSE PRACTITIONER

## 2023-09-22 PROCEDURE — 1101F PR PT FALLS ASSESS DOC 0-1 FALLS W/OUT INJ PAST YR: ICD-10-PCS | Mod: HCNC,CPTII,S$GLB, | Performed by: NURSE PRACTITIONER

## 2023-09-22 PROCEDURE — 1159F MED LIST DOCD IN RCRD: CPT | Mod: HCNC,CPTII,S$GLB, | Performed by: NURSE PRACTITIONER

## 2023-09-22 PROCEDURE — 99999 PR PBB SHADOW E&M-EST. PATIENT-LVL IV: ICD-10-PCS | Mod: PBBFAC,HCNC,, | Performed by: NURSE PRACTITIONER

## 2023-09-22 PROCEDURE — 1157F PR ADVANCE CARE PLAN OR EQUIV PRESENT IN MEDICAL RECORD: ICD-10-PCS | Mod: HCNC,CPTII,S$GLB, | Performed by: NURSE PRACTITIONER

## 2023-09-22 PROCEDURE — 3077F SYST BP >= 140 MM HG: CPT | Mod: HCNC,CPTII,S$GLB, | Performed by: NURSE PRACTITIONER

## 2023-09-22 PROCEDURE — 1101F PT FALLS ASSESS-DOCD LE1/YR: CPT | Mod: HCNC,CPTII,S$GLB, | Performed by: NURSE PRACTITIONER

## 2023-09-22 PROCEDURE — 1159F PR MEDICATION LIST DOCUMENTED IN MEDICAL RECORD: ICD-10-PCS | Mod: HCNC,CPTII,S$GLB, | Performed by: NURSE PRACTITIONER

## 2023-09-22 PROCEDURE — 3077F PR MOST RECENT SYSTOLIC BLOOD PRESSURE >= 140 MM HG: ICD-10-PCS | Mod: HCNC,CPTII,S$GLB, | Performed by: NURSE PRACTITIONER

## 2023-09-22 PROCEDURE — 90694 FLU VACCINE - QUADRIVALENT - ADJUVANTED: ICD-10-PCS | Mod: HCNC,S$GLB,, | Performed by: NURSE PRACTITIONER

## 2023-09-22 PROCEDURE — 3078F PR MOST RECENT DIASTOLIC BLOOD PRESSURE < 80 MM HG: ICD-10-PCS | Mod: HCNC,CPTII,S$GLB, | Performed by: NURSE PRACTITIONER

## 2023-09-22 PROCEDURE — 99214 PR OFFICE/OUTPT VISIT, EST, LEVL IV, 30-39 MIN: ICD-10-PCS | Mod: 25,HCNC,S$GLB, | Performed by: NURSE PRACTITIONER

## 2023-09-22 PROCEDURE — 90694 VACC AIIV4 NO PRSRV 0.5ML IM: CPT | Mod: HCNC,S$GLB,, | Performed by: NURSE PRACTITIONER

## 2023-09-22 PROCEDURE — 99999 PR PBB SHADOW E&M-EST. PATIENT-LVL IV: CPT | Mod: PBBFAC,HCNC,, | Performed by: NURSE PRACTITIONER

## 2023-09-22 PROCEDURE — 3078F DIAST BP <80 MM HG: CPT | Mod: HCNC,CPTII,S$GLB, | Performed by: NURSE PRACTITIONER

## 2023-09-22 PROCEDURE — 99214 OFFICE O/P EST MOD 30 MIN: CPT | Mod: 25,HCNC,S$GLB, | Performed by: NURSE PRACTITIONER

## 2023-09-22 NOTE — PATIENT INSTRUCTIONS
Check blood pressure and record    Ask daughter to check your blood pressure      Next Thursday - 9/28/23 - please call Dr. Germain's office and report blood pressure

## 2023-09-22 NOTE — PROGRESS NOTES
Avril Monzon  09/22/2023  [id number]    Rose Germain MD  Patient Care Team:  Rose Germain MD as PCP - General (Internal Medicine)  Conchita Garcia RD, CDE as Dietitian (Diabetes)  Debbie Wagner RD (Inactive) as Dietitian (Nutrition)  Angela Hanson MD as Consulting Physician (Nephrology)  Demetrio Mejia MD as Consulting Physician (Cardiology)  Conchita Garcia RD, CDE as Diabetes Educator (Diabetes)  Alina Bauer DPM as Consulting Physician (Podiatry)  Anette Montanez DPM as Consulting Physician (Podiatry)  JADIEL Coello MD as Consulting Physician (Ophthalmology)  Debbie Lopez, RN as Outpatient       [facility] Care Note      Chief Complaint:  Chief Complaint   Patient presents with    Follow-up     Pt is here for follow up after a fall. Pt is complaining of left shoulder, knees and arm pain.        History of Present Illness:    No further falls since 9/14/23. Reports continued left shoulder pain, limited ROM due to pain. Shoulder xray showed arthritic changes, no acute fractures. Pain to left shoulder has improved since fall.   Applied lidocaine patch and Tramadol which helped with shoulder pain.   Pain with flexion/extension/supination a pronation   No bruising, swelling or deformity present.   Pt plans to resume PT/OT next week.     SOB much better, did not use O2 with dialysis recently.       The following were reviewed: Active problem list, medication list, allergies, family history, social history, and Health Maintenance.     History:  Past Medical History:   Diagnosis Date    Acute hypoxemic respiratory failure 11/26/2018    Anemia     Arthritis     back, hand, knees    Back pain     Cataract     CKD stage G4/A3, GFR 15 - 29 and albumin creatinine ratio >300 mg/g     GFR 40% Jan 2014 and 33% ub 3/2014 (BRG)    Coronary artery disease involving native coronary artery of native heart 11/30/2018    Diabetic retinopathy     DM (diabetes mellitus)  type II controlled, neurological manifestation     Exudative age-related macular degeneration of left eye with active choroidal neovascularization 2/5/2019    GERD (gastroesophageal reflux disease)     Glaucoma     Heart failure     Hyperlipidemia     Hypertension     Non-ST elevation MI (NSTEMI) 11/28/2018    NSTEMI (non-ST elevated myocardial infarction) 11/27/2018    Peripheral neuropathy     Polyneuropathy     Proteinuria     >6 mo     Seizures     BRG 1/2014 -dick CT NRI showed small vessel    Skin ulcer of toe of right foot with fat layer exposed 10/14/2022    Stroke 2012,2014    Tobacco dependence     Type 2 diabetes with peripheral circulatory disorder, controlled      Past Surgical History:   Procedure Laterality Date    BREAST LUMPECTOMY Left     benign, when patient was 15 years old    BREAST MASS EXCISION      ,benign, age 15.    CATHETERIZATION OF BOTH LEFT AND RIGHT HEART N/A 11/28/2018    Procedure: CATHETERIZATION, HEART, BOTH LEFT AND RIGHT;  Surgeon: Michelle Holman MD;  Location: [facility] CATH LAB;  Service: Cardiology;  Laterality: N/A;    CATHETERIZATION OF BOTH LEFT AND RIGHT HEART N/A 11/30/2018    Procedure: CATHETERIZATION, HEART, BOTH LEFT AND RIGHT;  Surgeon: Michelle Holman MD;  Location: [facility] CATH LAB;  Service: Cardiology;  Laterality: N/A;    CORONARY ARTERY BYPASS GRAFT      CORONARY ARTERY BYPASS GRAFT (CABG) N/A 7/20/2022    Procedure: CORONARY ARTERY BYPASS GRAFT (CABG);  Surgeon: Sanju Locke MD;  Location: [facility] OR;  Service: Cardiothoracic;  Laterality: N/A;  2-VESSEL PUMP ASSIST WITH EPI-AORTIC ULTRASOUND    ENDOSCOPIC HARVEST OF VEIN Left 7/20/2022    Procedure: SURGICAL PROCUREMENT, VEIN, ENDOSCOPIC;  Surgeon: Sanju Locke MD;  Location: [facility] OR;  Service: Cardiothoracic;  Laterality: Left;    HYSTERECTOMY  1982    INJECTION OF ANESTHETIC AGENT AROUND MULTIPLE INTERCOSTAL NERVES N/A 7/20/2022    Procedure: BLOCK, NERVE, INTERCOSTAL, 2 OR MORE;  Surgeon: Sanju Locke  MD;  Location: [facility] OR;  Service: Cardiothoracic;  Laterality: N/A;  PARASTERNAL NERVE BLOCK    INSERTION OF SHUNT      LEFT HEART CATHETERIZATION Left 12/3/2018    Procedure: CATHETERIZATION, HEART, LEFT;  Surgeon: Michelle Holman MD;  Location: [facility] CATH LAB;  Service: Cardiology;  Laterality: Left;  LAD ATHERECTOMY STENT/ FEMORAL APPROACH    LEFT HEART CATHETERIZATION Left 7/13/2022    Procedure: CATHETERIZATION, HEART, LEFT;  Surgeon: Abhi Sloan MD;  Location: [facility] CATH LAB;  Service: Cardiology;  Laterality: Left;    OOPHORECTOMY      wrist cyst Right 1980s    dorsal wrist cyst     Family History   Problem Relation Age of Onset    Diabetes Mother     Hyperlipidemia Mother     Hypertension Mother     Heart disease Mother         MI    Muscular dystrophy Son     Cancer Maternal Grandmother         colon     Patient Active Problem List   Diagnosis    Constipation    Hypertension associated with diabetes    ESRD (end stage renal disease)    Proteinuria    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, with long-term current use of insulin    Cataracta brunescens of right eye    Bilateral carotid artery disease    Aortic atherosclerosis    Type 2 diabetes mellitus with circulatory disorder, with long-term current use of insulin    History of CVA (cerebrovascular accident)    Hyperparathyroidism    Vitamin D deficiency    DDD (degenerative disc disease), lumbar    Anemia in chronic kidney disease, on chronic dialysis    Iron deficiency anemia due to chronic blood loss    Hyperlipidemia associated with type 2 diabetes mellitus    Pulmonary hypertension    CAD (coronary artery disease)    Exudative age-related macular degeneration of left eye with active choroidal neovascularization    Non-rheumatic mitral regurgitation    Non-rheumatic tricuspid valve insufficiency    Age-related nuclear cataract of left eye    DM type 2 with diabetic peripheral neuropathy    Unspecified  inflammatory spondylopathy, lumbar region    Convulsions    Hemiplegia    Other specified complication of vascular prosthetic devices, implants and grafts, initial encounter    Chronic combined systolic and diastolic heart failure    Hyperkalemia    Metabolic acidosis    S/P CABG (coronary artery bypass graft)    COVID    Skin ulcer of toe of right foot with fat layer exposed    Type 2 diabetes mellitus with diabetic peripheral angiopathy without gangrene, with long-term current use of insulin    Allergic rhinitis    Other reduced mobility    Nausea & vomiting    Dysuria    Debility    Pleural effusion    Acute bilateral low back pain without sciatica    Anemia    Cerebrovascular accident    Hypertensive disorder    Bilateral pleural effusion    Syncope    Contusion of left shoulder    Fall    Sprain of left shoulder     Review of patient's allergies indicates:   Allergen Reactions    Codeine Swelling    Darvon [propoxyphene] Swelling    Atorvastatin      Muscle twitching       Medications:  Current Outpatient Medications on File Prior to Visit   Medication Sig Dispense Refill    amLODIPine (NORVASC) 5 MG tablet Take 5 mg by mouth.      aspirin (ECOTRIN) 81 MG EC tablet Take 1 tablet (81 mg total) by mouth once daily. 90 tablet 6    atropine 1% (ISOPTO ATROPINE) 1 % Drop Place 1 drop into the left eye 3 (three) times daily.      azelastine (ASTELIN) 137 mcg (0.1 %) nasal spray 1 spray (137 mcg total) by Nasal route 2 (two) times daily. 30 mL 0    blood sugar diagnostic (TRUE METRIX GLUCOSE TEST STRIP) Strp 1 strip by Misc.(Non-Drug; Combo Route) route 3 (three) times daily. 100 each 11    blood-glucose meter (TRUE METRIX GLUCOSE METER) Misc Use as instructed 1 each 0    cyanocobalamin (VITAMIN B-12) 1000 MCG tablet Take 100 mcg by mouth once daily.      folic acid (FOLVITE) 1 MG tablet Take 1 mg by mouth once daily.      insulin aspart protamine-insulin aspart (NOVOLOG MIX 70-30FLEXPEN U-100) 100 unit/mL (70-30)  "InPn pen Inject 22 units into skin before breakfast, 10 units before dinner. On dialysis days, give 12 units before dinner. 15 mL 3    lancets (TRUEPLUS LANCETS) 33 gauge Misc 1 lancet by Misc.(Non-Drug; Combo Route) route 3 (three) times daily. 100 each 11    LIDOcaine (LIDODERM) 5 % Place 1 patch onto the skin once daily. 15 patch 0    loratadine (CLARITIN) 10 mg tablet Take 1 tablet (10 mg total) by mouth once daily. 30 tablet 3    metoprolol tartrate (LOPRESSOR) 50 MG tablet Take 1 tablet (50 mg total) by mouth 2 (two) times daily. 180 tablet 11    ondansetron (ZOFRAN-ODT) 4 MG TbDL Take 1 tablet (4 mg total) by mouth every 8 (eight) hours as needed (nausea/vomiting). 12 tablet 0    pantoprazole (PROTONIX) 40 MG tablet Take 1 tablet (40 mg total) by mouth once daily. 90 tablet 3    pen needle, diabetic 32 gauge x 5/32" Ndle 1 each by Misc.(Non-Drug; Combo Route) route 2 (two) times a day. 100 each 11    rosuvastatin (CRESTOR) 20 MG tablet Take 1 tablet (20 mg total) by mouth once daily. 90 tablet 3    sevelamer carbonate (RENVELA) 800 mg Tab Take 3 tablets (2,400 mg) by mouth three times a day with meals and 2 tablets (1,600 mg) by mouth with snacks 1170 tablet 3    traMADoL (ULTRAM) 50 mg tablet Take 1 tablet (50 mg total) by mouth every 6 (six) hours as needed for Pain. 30 each 0    [DISCONTINUED] isosorbide-hydrALAZINE 20-37.5 mg (BIDIL) 20-37.5 mg Tab Take 1 tablet by mouth 3 (three) times daily. 90 tablet 1     No current facility-administered medications on file prior to visit.       Medications have been reviewed and reconciled with patient at visit today.      Exam:  Vitals:    09/22/23 1354   BP: (!) 144/48   Pulse: 68   Temp: 97.6 °F (36.4 °C)     Weight: 61.5 kg (135 lb 8 oz)   Body mass index is 22.55 kg/m².      BP Readings from Last 3 Encounters:   09/22/23 (!) 144/48   09/14/23 (!) 142/60   09/12/23 (!) 142/62     Wt Readings from Last 3 Encounters:   09/22/23 1354 61.5 kg (135 lb 8 oz)   09/14/23 " 1101 62.2 kg (137 lb 1.6 oz)   09/12/23 1052 61.9 kg (136 lb 7.4 oz)        REVIEW OF SYSTEMS  Review of Systems   Constitutional:  Negative for chills, fever and malaise/fatigue.   HENT:  Negative for congestion, ear pain and sore throat.    Respiratory:  Negative for cough, shortness of breath and wheezing.    Cardiovascular:  Negative for chest pain, palpitations and leg swelling.   Gastrointestinal:  Negative for abdominal pain, diarrhea, nausea and vomiting.   Musculoskeletal:  Positive for falls and joint pain. Negative for back pain, myalgias and neck pain.   Neurological:  Negative for dizziness, focal weakness and headaches.   Psychiatric/Behavioral:  Negative for depression. The patient is not nervous/anxious.        Physical Exam  Vitals reviewed.   Constitutional:       General: She is not in acute distress.     Appearance: Normal appearance.   HENT:      Head: Normocephalic and atraumatic.      Nose: Nose normal.      Mouth/Throat:      Mouth: Mucous membranes are moist.   Eyes:      Conjunctiva/sclera: Conjunctivae normal.   Cardiovascular:      Rate and Rhythm: Normal rate and regular rhythm.      Heart sounds: Murmur heard.   Pulmonary:      Effort: Pulmonary effort is normal. No respiratory distress.      Breath sounds: Normal breath sounds.   Abdominal:      Palpations: Abdomen is soft.      Tenderness: There is no abdominal tenderness.   Musculoskeletal:         General: No swelling or deformity.      Left shoulder: Tenderness present. No swelling, deformity or effusion. Decreased range of motion. Normal strength. Normal pulse.        Arms:       Cervical back: Normal range of motion and neck supple.      Right lower leg: No edema.      Left lower leg: No edema.   Skin:     General: Skin is warm and dry.   Neurological:      Mental Status: She is alert and oriented to person, place, and time. Mental status is at baseline.   Psychiatric:         Mood and Affect: Mood normal.         Thought  Content: Thought content normal.         Laboratory Reviewed:     Lab Results   Component Value Date    WBC 5.87 08/10/2023    HGB 8.8 (L) 08/10/2023    HCT 27.3 (L) 08/10/2023     08/10/2023    CHOL 171 07/13/2022    TRIG 46 07/13/2022    HDL 59 07/13/2022    ALT 5 (L) 08/10/2023    AST 9 (L) 08/10/2023     08/10/2023    K 4.6 08/10/2023    CL 99 08/10/2023    CREATININE 8.4 (H) 08/10/2023    BUN 51 (H) 08/10/2023    CO2 22 (L) 08/10/2023    TSH 1.686 08/04/2023    INR 1.0 08/07/2022    HGBA1C 6.6 (H) 04/04/2023       Screening or Prevention Patient's value Goal Complete/Controlled?   HgA1C Testing and Control   Lab Results   Component Value Date    HGBA1C 6.6 (H) 04/04/2023      Annually/Less than 8% Yes   Lipid profile : 06/28/2023 Annually No   LDL control Lab Results   Component Value Date    LDLCALC 102.8 07/13/2022    Annually/Less than 100 mg/dl  No   Nephropathy screening Lab Results   Component Value Date    LABMICR 1555.0 06/13/2018     Lab Results   Component Value Date    PROTEINUA 3+ (A) 08/26/2022    Annually No   Blood pressure BP Readings from Last 1 Encounters:   09/22/23 (!) 144/48    Less than 140/90 No   Dilated retinal exam : 09/01/2022 Annually No   Foot exam   : 03/08/2022 Annually No       Health Maintenance  Health Maintenance Topics with due status: Not Due       Topic Last Completion Date    TETANUS VACCINE 12/12/2021    Lipid Panel 06/28/2023     Health Maintenance Due   Topic Date Due    Shingles Vaccine (1 of 2) Never done    DEXA Scan  Never done    COVID-19 Vaccine (4 - Moderna series) 02/25/2022    Foot Exam  03/08/2023    Eye Exam  09/01/2023    Hemoglobin A1c  10/04/2023       Assessment and Plan:  1. Fall, initial encounter  Assessment & Plan:  No further falls  Continue PT/OT      2. Hypertension associated with diabetes  Assessment & Plan:  B/P currently elevated  Continue metoprolol, amlodipine  Will call 1 week for recorded BP      3. Sprain of left shoulder,  unspecified shoulder sprain type, initial encounter  Assessment & Plan:  Continue lidocaine patch, Tramadol and Tylenol  Pain is improving  Return to PT      Other orders  -     Influenza - Quadrivalent (Adjuvanted)                 -Patient's lab results were reviewed and discussed with patient  -Treatment options and alternatives were discussed with the patient. Patient expressed understanding. Patient was given the opportunity to ask questions and be an active participant in their medical care. Patient had no further questions or concerns at this time.         Future Appointments   Date Time Provider Department Center   10/10/2023  2:30 PM [facility]   11/15/2023 11:00 AM Natty Mistry NP [facility] [place]   12/1/2023  9:20 AM Nidhi Miller [place] [facility] [place] [facility]   12/1/2023 10:20 AM Rose Germain MD [facility] [place] [facility]   12/5/2023  8:30 AM LABORATORY, [facility]   12/15/2023  3:20 PM Michelle Holman MD [facility] CARDIO [facility]          After visit summary printed and given to patient upon discharge.  Patient goals and care plan are included in After visit summary.    Total medical decision making time was 31 min.    The following issues were discussed: The primary encounter diagnosis was Fall, initial encounter. Diagnoses of Hypertension associated with diabetes and Sprain of left shoulder, unspecified shoulder sprain type, initial encounter were also pertinent to this visit.    Health maintenance needs, recent test results and goals of care discussed with pt and questions answered.           ADEBAYO Bagley, NP-C [facility]  [address] [place] [address]

## 2023-09-28 ENCOUNTER — TELEPHONE (OUTPATIENT)
Dept: PRIMARY CARE CLINIC | Facility: CLINIC | Age: 78
End: 2023-09-28
Payer: MEDICARE

## 2023-09-28 VITALS — DIASTOLIC BLOOD PRESSURE: 63 MMHG | SYSTOLIC BLOOD PRESSURE: 135 MMHG

## 2023-09-28 NOTE — TELEPHONE ENCOUNTER
Spoke with pt and got blood pressure readings. Pt states she hasn't taken blood pressure in a week.

## 2023-09-29 ENCOUNTER — OUTPATIENT CASE MANAGEMENT (OUTPATIENT)
Dept: ADMINISTRATIVE | Facility: OTHER | Age: 78
End: 2023-09-29
Payer: MEDICARE

## 2023-10-10 ENCOUNTER — TELEPHONE (OUTPATIENT)
Dept: DIABETES | Facility: CLINIC | Age: 78
End: 2023-10-10
Payer: MEDICARE

## 2023-10-10 NOTE — TELEPHONE ENCOUNTER
[place] order resubmitted to [facility] medical there were missing documents on the previous order from 8/15

## 2023-10-13 DIAGNOSIS — M54.50 ACUTE BILATERAL LOW BACK PAIN WITHOUT SCIATICA: ICD-10-CM

## 2023-10-13 RX ORDER — LIDOCAINE 50 MG/G
1 PATCH TOPICAL DAILY
Qty: 15 PATCH | Refills: 0 | Status: ON HOLD | OUTPATIENT
Start: 2023-10-13 | End: 2024-03-16

## 2023-10-17 ENCOUNTER — OUTPATIENT CASE MANAGEMENT (OUTPATIENT)
Dept: ADMINISTRATIVE | Facility: OTHER | Age: 78
End: 2023-10-17
Payer: MEDICARE

## 2023-10-17 NOTE — PROGRESS NOTES
Outpatient Care Management  Plan of Care Follow Up Visit    Patient: Avril Monzon  MRN: [id number]  Date of Service: 10/17/2023  Completed by: Debbie Lopez RN  Referral Date: 08/10/2023    Reason for Visit   Patient presents with    [facility] RN Follow Up Call    Case Closure       Brief Summary: Unable to reach after 3 phone contact attempts and no response rec'd from letter mailed on 9/19/23 requesting return call.   Next Steps: Will d/c from [facility] today. Debbie Lopez RN.

## 2023-11-13 ENCOUNTER — HOSPITAL ENCOUNTER (INPATIENT)
Facility: HOSPITAL | Age: 78
LOS: 1 days | Discharge: HOME | DRG: 291 | End: 2023-11-14
Attending: EMERGENCY MEDICINE | Admitting: HOSPITALIST
Payer: MEDICARE

## 2023-11-13 DIAGNOSIS — J96.01 ACUTE HYPOXEMIC RESPIRATORY FAILURE: Primary | ICD-10-CM

## 2023-11-13 DIAGNOSIS — R06.02 SHORTNESS OF BREATH: ICD-10-CM

## 2023-11-13 DIAGNOSIS — N18.6 ESRD (END STAGE RENAL DISEASE) ON DIALYSIS: ICD-10-CM

## 2023-11-13 DIAGNOSIS — Z99.2 ESRD (END STAGE RENAL DISEASE) ON DIALYSIS: ICD-10-CM

## 2023-11-13 DIAGNOSIS — J81.0 ACUTE PULMONARY EDEMA: ICD-10-CM

## 2023-11-13 DIAGNOSIS — R07.9 CHEST PAIN: ICD-10-CM

## 2023-11-13 LAB
ALBUMIN SERPL BCP-MCNC: 3.8 G/DL (ref 3.5–5.2)
ALP SERPL-CCNC: 98 U/L (ref 55–135)
ALT SERPL W/O P-5'-P-CCNC: 20 U/L (ref 10–44)
ANION GAP SERPL CALC-SCNC: 20 MMOL/L (ref 8–16)
AST SERPL-CCNC: 22 U/L (ref 10–40)
BASOPHILS # BLD AUTO: 0.01 K/UL (ref 0–0.2)
BASOPHILS NFR BLD: 0.1 % (ref 0–1.9)
BILIRUB SERPL-MCNC: 0.3 MG/DL (ref 0.1–1)
BNP SERPL-MCNC: 3585 PG/ML (ref 0–99)
BUN SERPL-MCNC: 47 MG/DL (ref 8–23)
CALCIUM SERPL-MCNC: 10.3 MG/DL (ref 8.7–10.5)
CHLORIDE SERPL-SCNC: 103 MMOL/L (ref 95–110)
CO2 SERPL-SCNC: 20 MMOL/L (ref 23–29)
CREAT SERPL-MCNC: 8.3 MG/DL (ref 0.5–1.4)
DIFFERENTIAL METHOD: ABNORMAL
EOSINOPHIL # BLD AUTO: 0.1 K/UL (ref 0–0.5)
EOSINOPHIL NFR BLD: 0.9 % (ref 0–8)
ERYTHROCYTE [DISTWIDTH] IN BLOOD BY AUTOMATED COUNT: 13.6 % (ref 11.5–14.5)
EST. GFR  (NO RACE VARIABLE): 5 ML/MIN/1.73 M^2
GLUCOSE SERPL-MCNC: 114 MG/DL (ref 70–110)
HCT VFR BLD AUTO: 37.1 % (ref 37–48.5)
HGB BLD-MCNC: 11.4 G/DL (ref 12–16)
IMM GRANULOCYTES # BLD AUTO: 0.02 K/UL (ref 0–0.04)
IMM GRANULOCYTES NFR BLD AUTO: 0.3 % (ref 0–0.5)
INFLUENZA A, MOLECULAR: NEGATIVE
INFLUENZA B, MOLECULAR: NEGATIVE
LYMPHOCYTES # BLD AUTO: 0.7 K/UL (ref 1–4.8)
LYMPHOCYTES NFR BLD: 8.8 % (ref 18–48)
MCH RBC QN AUTO: 31.8 PG (ref 27–31)
MCHC RBC AUTO-ENTMCNC: 30.7 G/DL (ref 32–36)
MCV RBC AUTO: 103 FL (ref 82–98)
MONOCYTES # BLD AUTO: 0.4 K/UL (ref 0.3–1)
MONOCYTES NFR BLD: 4.6 % (ref 4–15)
NEUTROPHILS # BLD AUTO: 6.6 K/UL (ref 1.8–7.7)
NEUTROPHILS NFR BLD: 85.3 % (ref 38–73)
NRBC BLD-RTO: 0 /100 WBC
PLATELET # BLD AUTO: 193 K/UL (ref 150–450)
PMV BLD AUTO: 9.8 FL (ref 9.2–12.9)
POCT GLUCOSE: 116 MG/DL (ref 70–110)
POCT GLUCOSE: 52 MG/DL (ref 70–110)
POCT GLUCOSE: 77 MG/DL (ref 70–110)
POTASSIUM SERPL-SCNC: 4.5 MMOL/L (ref 3.5–5.1)
PROT SERPL-MCNC: 7.7 G/DL (ref 6–8.4)
RBC # BLD AUTO: 3.59 M/UL (ref 4–5.4)
SARS-COV-2 RDRP RESP QL NAA+PROBE: NEGATIVE
SODIUM SERPL-SCNC: 143 MMOL/L (ref 136–145)
SPECIMEN SOURCE: NORMAL
TROPONIN I SERPL DL<=0.01 NG/ML-MCNC: 0.13 NG/ML (ref 0–0.03)
WBC # BLD AUTO: 7.75 K/UL (ref 3.9–12.7)

## 2023-11-13 PROCEDURE — 80053 COMPREHEN METABOLIC PANEL: CPT | Mod: HCNC | Performed by: EMERGENCY MEDICINE

## 2023-11-13 PROCEDURE — 82962 GLUCOSE BLOOD TEST: CPT | Mod: HCNC

## 2023-11-13 PROCEDURE — 90935 PR HEMODIALYSIS, ONE EVALUATION: ICD-10-PCS | Mod: HCNC,,, | Performed by: INTERNAL MEDICINE

## 2023-11-13 PROCEDURE — 90935 HEMODIALYSIS ONE EVALUATION: CPT | Mod: HCNC,,, | Performed by: INTERNAL MEDICINE

## 2023-11-13 PROCEDURE — 99223 PR INITIAL HOSPITAL CARE,LEVL III: ICD-10-PCS | Mod: HCNC,25,, | Performed by: INTERNAL MEDICINE

## 2023-11-13 PROCEDURE — 87502 INFLUENZA DNA AMP PROBE: CPT | Mod: HCNC

## 2023-11-13 PROCEDURE — G0257 UNSCHED DIALYSIS ESRD PT HOS: HCPCS | Mod: HCNC

## 2023-11-13 PROCEDURE — U0002 COVID-19 LAB TEST NON-CDC: HCPCS | Mod: HCNC | Performed by: EMERGENCY MEDICINE

## 2023-11-13 PROCEDURE — 93010 EKG 12-LEAD: ICD-10-PCS | Mod: HCNC,,, | Performed by: INTERNAL MEDICINE

## 2023-11-13 PROCEDURE — 93005 ELECTROCARDIOGRAM TRACING: CPT | Mod: HCNC

## 2023-11-13 PROCEDURE — 87502 INFLUENZA DNA AMP PROBE: CPT | Mod: HCNC | Performed by: EMERGENCY MEDICINE

## 2023-11-13 PROCEDURE — 25000003 PHARM REV CODE 250: Mod: HCNC | Performed by: NURSE PRACTITIONER

## 2023-11-13 PROCEDURE — 99285 EMERGENCY DEPT VISIT HI MDM: CPT | Mod: 25,HCNC

## 2023-11-13 PROCEDURE — 84484 ASSAY OF TROPONIN QUANT: CPT | Mod: HCNC | Performed by: EMERGENCY MEDICINE

## 2023-11-13 PROCEDURE — 85025 COMPLETE CBC W/AUTO DIFF WBC: CPT | Mod: HCNC | Performed by: EMERGENCY MEDICINE

## 2023-11-13 PROCEDURE — 11000001 HC ACUTE MED/SURG PRIVATE ROOM: Mod: HCNC

## 2023-11-13 PROCEDURE — 93010 ELECTROCARDIOGRAM REPORT: CPT | Mod: HCNC,,, | Performed by: INTERNAL MEDICINE

## 2023-11-13 PROCEDURE — 99223 1ST HOSP IP/OBS HIGH 75: CPT | Mod: HCNC,25,, | Performed by: INTERNAL MEDICINE

## 2023-11-13 PROCEDURE — 83880 ASSAY OF NATRIURETIC PEPTIDE: CPT | Mod: HCNC | Performed by: EMERGENCY MEDICINE

## 2023-11-13 RX ORDER — HYDROCODONE BITARTRATE AND ACETAMINOPHEN 5; 325 MG/1; MG/1
1 TABLET ORAL EVERY 6 HOURS PRN
Status: DISCONTINUED | OUTPATIENT
Start: 2023-11-13 | End: 2023-11-14 | Stop reason: HOSPADM

## 2023-11-13 RX ORDER — SIMETHICONE 80 MG
1 TABLET,CHEWABLE ORAL 4 TIMES DAILY PRN
Status: DISCONTINUED | OUTPATIENT
Start: 2023-11-13 | End: 2023-11-14 | Stop reason: HOSPADM

## 2023-11-13 RX ORDER — LOSARTAN POTASSIUM 25 MG/1
25 TABLET ORAL DAILY
Status: DISCONTINUED | OUTPATIENT
Start: 2023-11-13 | End: 2023-11-14 | Stop reason: HOSPADM

## 2023-11-13 RX ORDER — INSULIN ASPART 100 [IU]/ML
0-5 INJECTION, SOLUTION INTRAVENOUS; SUBCUTANEOUS
Status: DISCONTINUED | OUTPATIENT
Start: 2023-11-13 | End: 2023-11-14 | Stop reason: HOSPADM

## 2023-11-13 RX ORDER — TELMISARTAN 20 MG/1
20 TABLET ORAL DAILY
COMMUNITY
End: 2023-12-18 | Stop reason: SDUPTHER

## 2023-11-13 RX ORDER — FOLIC ACID 1 MG/1
1 TABLET ORAL DAILY
Status: DISCONTINUED | OUTPATIENT
Start: 2023-11-14 | End: 2023-11-14 | Stop reason: HOSPADM

## 2023-11-13 RX ORDER — PREDNISOLONE ACETATE 10 MG/ML
1 SUSPENSION/ DROPS OPHTHALMIC 4 TIMES DAILY
Status: ON HOLD | COMMUNITY
Start: 2023-11-09 | End: 2024-03-16

## 2023-11-13 RX ORDER — NALOXONE HCL 0.4 MG/ML
0.02 VIAL (ML) INJECTION
Status: DISCONTINUED | OUTPATIENT
Start: 2023-11-13 | End: 2023-11-14 | Stop reason: HOSPADM

## 2023-11-13 RX ORDER — TALC
6 POWDER (GRAM) TOPICAL NIGHTLY PRN
Status: DISCONTINUED | OUTPATIENT
Start: 2023-11-13 | End: 2023-11-14 | Stop reason: HOSPADM

## 2023-11-13 RX ORDER — PANTOPRAZOLE SODIUM 40 MG/1
40 TABLET, DELAYED RELEASE ORAL DAILY
Status: DISCONTINUED | OUTPATIENT
Start: 2023-11-14 | End: 2023-11-14 | Stop reason: HOSPADM

## 2023-11-13 RX ORDER — GLUCAGON 1 MG
1 KIT INJECTION
Status: DISCONTINUED | OUTPATIENT
Start: 2023-11-13 | End: 2023-11-14 | Stop reason: HOSPADM

## 2023-11-13 RX ORDER — IBUPROFEN 200 MG
16 TABLET ORAL
Status: DISCONTINUED | OUTPATIENT
Start: 2023-11-13 | End: 2023-11-14 | Stop reason: HOSPADM

## 2023-11-13 RX ORDER — POLYETHYLENE GLYCOL 3350 17 G/17G
17 POWDER, FOR SOLUTION ORAL 2 TIMES DAILY PRN
Status: DISCONTINUED | OUTPATIENT
Start: 2023-11-13 | End: 2023-11-14 | Stop reason: HOSPADM

## 2023-11-13 RX ORDER — SODIUM CHLORIDE 0.9 % (FLUSH) 0.9 %
10 SYRINGE (ML) INJECTION EVERY 8 HOURS PRN
Status: DISCONTINUED | OUTPATIENT
Start: 2023-11-13 | End: 2023-11-14 | Stop reason: HOSPADM

## 2023-11-13 RX ORDER — ONDANSETRON 2 MG/ML
4 INJECTION INTRAMUSCULAR; INTRAVENOUS EVERY 6 HOURS PRN
Status: DISCONTINUED | OUTPATIENT
Start: 2023-11-13 | End: 2023-11-14 | Stop reason: HOSPADM

## 2023-11-13 RX ORDER — PROCHLORPERAZINE EDISYLATE 5 MG/ML
5 INJECTION INTRAMUSCULAR; INTRAVENOUS EVERY 6 HOURS PRN
Status: DISCONTINUED | OUTPATIENT
Start: 2023-11-13 | End: 2023-11-14 | Stop reason: HOSPADM

## 2023-11-13 RX ORDER — METOPROLOL TARTRATE 50 MG/1
50 TABLET ORAL 2 TIMES DAILY
Status: DISCONTINUED | OUTPATIENT
Start: 2023-11-13 | End: 2023-11-14 | Stop reason: HOSPADM

## 2023-11-13 RX ORDER — IBUPROFEN 200 MG
24 TABLET ORAL
Status: DISCONTINUED | OUTPATIENT
Start: 2023-11-13 | End: 2023-11-14 | Stop reason: HOSPADM

## 2023-11-13 RX ORDER — SEVELAMER CARBONATE 800 MG/1
1600 TABLET, FILM COATED ORAL
Status: DISCONTINUED | OUTPATIENT
Start: 2023-11-14 | End: 2023-11-14 | Stop reason: HOSPADM

## 2023-11-13 RX ORDER — IPRATROPIUM BROMIDE AND ALBUTEROL SULFATE 2.5; .5 MG/3ML; MG/3ML
3 SOLUTION RESPIRATORY (INHALATION) EVERY 6 HOURS PRN
Status: DISCONTINUED | OUTPATIENT
Start: 2023-11-13 | End: 2023-11-14 | Stop reason: HOSPADM

## 2023-11-13 RX ORDER — ACETAMINOPHEN 325 MG/1
650 TABLET ORAL EVERY 4 HOURS PRN
Status: DISCONTINUED | OUTPATIENT
Start: 2023-11-13 | End: 2023-11-14 | Stop reason: HOSPADM

## 2023-11-13 RX ORDER — HEPARIN SODIUM 5000 [USP'U]/ML
5000 INJECTION, SOLUTION INTRAVENOUS; SUBCUTANEOUS EVERY 8 HOURS
Status: DISCONTINUED | OUTPATIENT
Start: 2023-11-13 | End: 2023-11-14 | Stop reason: HOSPADM

## 2023-11-13 RX ORDER — ASPIRIN 81 MG/1
81 TABLET ORAL DAILY
Status: DISCONTINUED | OUTPATIENT
Start: 2023-11-14 | End: 2023-11-14 | Stop reason: HOSPADM

## 2023-11-13 RX ORDER — AMOXICILLIN 250 MG
1 CAPSULE ORAL 2 TIMES DAILY
Status: DISCONTINUED | OUTPATIENT
Start: 2023-11-13 | End: 2023-11-14 | Stop reason: HOSPADM

## 2023-11-13 RX ADMIN — LOSARTAN POTASSIUM 25 MG: 25 TABLET, FILM COATED ORAL at 11:11

## 2023-11-13 RX ADMIN — SENNOSIDES AND DOCUSATE SODIUM 1 TABLET: 50; 8.6 TABLET ORAL at 11:11

## 2023-11-13 RX ADMIN — METOPROLOL TARTRATE 50 MG: 50 TABLET, FILM COATED ORAL at 11:11

## 2023-11-13 NOTE — PHARMACY MED REC
"Admission Medication History     The home medication history was taken by Randal Don.    You may go to "Admission" then "Reconcile Home Medications" tabs to review and/or act upon these items.     The home medication list has been updated by the Pharmacy department.   Please read ALL comments highlighted in yellow.   Please address this information as you see fit.    Feel free to contact us if you have any questions or require assistance.      [facility].  She currently takes MICARDIS with no side effect.    Medications listed below were obtained from: Patient/family and Analytic software- [employer]  (Not in a hospital admission)        Randal Don  [id number]    Current Outpatient Medications on File Prior to Encounter   Medication Sig Dispense Refill Last Dose    aspirin (ECOTRIN) 81 MG EC tablet Take 1 tablet (81 mg total) by mouth once daily. 90 tablet 6 11/13/2023    atropine 1% (ISOPTO ATROPINE) 1 % Drop Place 1 drop into the left eye 3 (three) times daily.   11/13/2023    cyanocobalamin (VITAMIN B-12) 1000 MCG tablet Take 100 mcg by mouth once daily.   11/13/2023    folic acid (FOLVITE) 1 MG tablet Take 1 mg by mouth once daily.   11/13/2023    insulin aspart protamine-insulin aspart (NOVOLOG MIX 70-30FLEXPEN U-100) 100 unit/mL (70-30) InPn pen Inject 22 units into skin before breakfast, 10 units before dinner. On dialysis days, give 12 units before dinner. 15 mL 3 11/13/2023    LIDOcaine (LIDODERM) 5 % Place 1 patch onto the skin once daily. 15 patch 0 11/13/2023    loratadine (CLARITIN) 10 mg tablet Take 1 tablet (10 mg total) by mouth once daily. 30 tablet 3 11/13/2023    metoprolol tartrate (LOPRESSOR) 50 MG tablet Take 1 tablet (50 mg total) by mouth 2 (two) times daily. 180 tablet 11 11/13/2023    pantoprazole (PROTONIX) 40 MG tablet Take 1 tablet (40 mg total) by mouth once daily. 90 tablet 3 11/13/2023    " "prednisoLONE acetate (PRED FORTE) 1 % DrpS Place 1 drop into both eyes 4 (four) times daily.   11/13/2023    rosuvastatin (CRESTOR) 20 MG tablet Take 1 tablet (20 mg total) by mouth once daily. 90 tablet 3 11/13/2023    sevelamer carbonate (RENVELA) 800 mg Tab Take 3 tablets (2,400 mg) by mouth three times a day with meals and 2 tablets (1,600 mg) by mouth with snacks 1170 tablet 3 11/13/2023    telmisartan (MICARDIS) 20 MG Tab Take 20 mg by mouth once daily.   11/13/2023    amLODIPine (NORVASC) 5 MG tablet Take 5 mg by mouth once daily.       azelastine (ASTELIN) 137 mcg (0.1 %) nasal spray 1 spray (137 mcg total) by Nasal route 2 (two) times daily. 30 mL 0     blood sugar diagnostic (TRUE METRIX GLUCOSE TEST STRIP) Strp 1 strip by Misc.(Non-Drug; Combo Route) route 3 (three) times daily. 100 each 11     ondansetron (ZOFRAN-ODT) 4 MG TbDL Take 1 tablet (4 mg total) by mouth every 8 (eight) hours as needed (nausea/vomiting). 12 tablet 0     pen needle, diabetic 32 gauge x 5/32" Ndle 1 each by Misc.(Non-Drug; Combo Route) route 2 (two) times a day. 100 each 11     traMADoL (ULTRAM) 50 mg tablet Take 1 tablet (50 mg total) by mouth every 6 (six) hours as needed for Pain. 30 each 0      .          "

## 2023-11-13 NOTE — ED PROVIDER NOTES
SCRIBE #1 NOTE: I, Paige Mcgill, am scribing for, and in the presence of, Lizzette Holder MD. I have scribed the entire note.      History      Chief Complaint   Patient presents with    General Illness     Per ems, pt originally called ems for hypoglycemia, once ems arrived on scene pt was no longer hypoglycemic and was now complaining of SOB. Pt then told ems that she is always short of breath when she lays down and today's Sob is not new. Pt then requested to still come to the hospital because she missed her dialysis appointment today because she wanted to go to her doctors appointment.        Review of patient's allergies indicates:   Allergen Reactions    Norvasc [amlodipine] Shortness Of Breath    Codeine Swelling    Darvon [propoxyphene] Swelling    Atorvastatin      Muscle twitching        HPI   HPI    11/13/2023, 3:51 PM   History obtained from the patient and the pt's daughter at bedside      History of Present Illness: Avril Monzon is a 78 y.o. female patient with a PMHx of CKD on dialysis, DM2, HLD, HTN, seizures, CVA, and tobacco dependence who presents to the Emergency Department for multiple complaints. The pt reports that she was supposed to go to dialysis this morning, but did not go because she was experiencing a cough and wheezing, so she made an appointment with her PCP due to these sxs. Pt was also recently exposed to COVID-19. The pt had an appointment with her PCP for 15:30 today, but she was unable to make it because she became hypoglycemic. The pt's daughter reports that she gave the pt a coke and sayda bread which brought her blood sugar up. The pt's initial blood sugar is unknown because the pt's blood sugar was not taken. At this time, the pt c/o chronic SOB upon exertion, a cough, and wheezing. No mitigating factors reported. Associated sxs include reduced PO intake. Patient denies any fever, chills, CP, N/V/D, numbness, and all other sxs at this time. Pt is followed by Dr. Hansno  (Nephrology). No further complaints or concerns at this time.         Arrival mode: EMS    PCP: Rose Germain MD       Past Medical History:  Past Medical History:   Diagnosis Date    Acute hypoxemic respiratory failure 11/26/2018    Anemia     Arthritis     back, hand, knees    Back pain     Cataract     CKD stage G4/A3, GFR 15 - 29 and albumin creatinine ratio >300 mg/g     GFR 40% Jan 2014 and 33% ub 3/2014 ([facility])    Coronary artery disease involving native coronary artery of native heart 11/30/2018    Diabetic retinopathy     DM (diabetes mellitus) type II controlled, neurological manifestation     Exudative age-related macular degeneration of left eye with active choroidal neovascularization 2/5/2019    GERD (gastroesophageal reflux disease)     Glaucoma     Heart failure     Hyperlipidemia     Hypertension     Non-ST elevation MI (NSTEMI) 11/28/2018    NSTEMI (non-ST elevated myocardial infarction) 11/27/2018    Peripheral neuropathy     Polyneuropathy     Proteinuria     >6 mo     Seizures     [facility]G 1/2014 -dick CT NRI showed small vessel    Skin ulcer of toe of right foot with fat layer exposed 10/14/2022    Stroke 2012,2014    Tobacco dependence     Type 2 diabetes with peripheral circulatory disorder, controlled        Past Surgical History:  Past Surgical History:   Procedure Laterality Date    BREAST LUMPECTOMY Left     benign, when patient was 15 years old    BREAST MASS EXCISION      ,benign, age 15.    CATHETERIZATION OF BOTH LEFT AND RIGHT HEART N/A 11/28/2018    Procedure: CATHETERIZATION, HEART, BOTH LEFT AND RIGHT;  Surgeon: Michelle Holamn MD;  Location: [facility] CATH LAB;  Service: Cardiology;  Laterality: N/A;    CATHETERIZATION OF BOTH LEFT AND RIGHT HEART N/A 11/30/2018    Procedure: CATHETERIZATION, HEART, BOTH LEFT AND RIGHT;  Surgeon: Michelle Holman MD;  Location: [facility] CATH LAB;  Service: Cardiology;  Laterality: N/A;    CORONARY ARTERY BYPASS GRAFT      CORONARY ARTERY BYPASS GRAFT (CABG)  N/A 2022    Procedure: CORONARY ARTERY BYPASS GRAFT (CABG);  Surgeon: Sanju Locke MD;  Location: [facility] OR;  Service: Cardiothoracic;  Laterality: N/A;  2-VESSEL PUMP ASSIST WITH EPI-AORTIC ULTRASOUND    ENDOSCOPIC HARVEST OF VEIN Left 2022    Procedure: SURGICAL PROCUREMENT, VEIN, ENDOSCOPIC;  Surgeon: Sanju Locke MD;  Location: [facility] OR;  Service: Cardiothoracic;  Laterality: Left;    HYSTERECTOMY  1982    INJECTION OF ANESTHETIC AGENT AROUND MULTIPLE INTERCOSTAL NERVES N/A 2022    Procedure: BLOCK, NERVE, INTERCOSTAL, 2 OR MORE;  Surgeon: Sanju Locke MD;  Location: [facility] OR;  Service: Cardiothoracic;  Laterality: N/A;  PARASTERNAL NERVE BLOCK    INSERTION OF SHUNT      LEFT HEART CATHETERIZATION Left 12/3/2018    Procedure: CATHETERIZATION, HEART, LEFT;  Surgeon: Michelle Holman MD;  Location: [facility] CATH LAB;  Service: Cardiology;  Laterality: Left;  LAD ATHERECTOMY STENT/ FEMORAL APPROACH    LEFT HEART CATHETERIZATION Left 2022    Procedure: CATHETERIZATION, HEART, LEFT;  Surgeon: Abhi Sloan MD;  Location: [facility] CATH LAB;  Service: Cardiology;  Laterality: Left;    OOPHORECTOMY      wrist cyst Right 1980s    dorsal wrist cyst         Family History:  Family History   Problem Relation Age of Onset    Diabetes Mother     Hyperlipidemia Mother     Hypertension Mother     Heart disease Mother         MI    Muscular dystrophy Son     Cancer Maternal Grandmother         colon       Social History:  Social History     Tobacco Use    Smoking status: Former     Current packs/day: 0.00     Average packs/day: 1.5 packs/day for 30.0 years (45.0 ttl pk-yrs)     Types: Cigarettes     Start date: 1960     Quit date: 1990     Years since quittin.8    Smokeless tobacco: Former   Substance and Sexual Activity    Alcohol use: No     Alcohol/week: 0.0 standard drinks of alcohol    Drug use: No    Sexual activity: Not Currently       ROS   Review of Systems   Constitutional:   Positive for appetite change (reduced PO intake). Negative for chills and fever.   HENT:  Negative for sore throat.    Respiratory:  Positive for cough, shortness of breath and wheezing.    Cardiovascular:  Negative for chest pain.   Gastrointestinal:  Negative for diarrhea, nausea and vomiting.   Genitourinary:  Negative for dysuria.   Musculoskeletal:  Negative for back pain.   Skin:  Negative for rash.   Neurological:  Negative for numbness.   Hematological:  Does not bruise/bleed easily.   All other systems reviewed and are negative.    Physical Exam      Initial Vitals [11/13/23 1447]   BP Pulse Resp Temp SpO2   (!) 165/85 65 16 97.7 °F (36.5 °C) 100 %      MAP       --          Physical Exam  Nursing Notes and Vital Signs Reviewed.  Constitutional: Patient is in no acute distress. Well-developed and well-nourished.  Head: Atraumatic. Normocephalic.  Eyes: PERRL. EOM intact. Conjunctivae are not pale. No scleral icterus.  ENT: Mucous membranes are moist. Oropharynx is clear and symmetric.    Neck: Supple. Full ROM. No lymphadenopathy.  Cardiovascular: Regular rate. Regular rhythm. No murmurs, rubs, or gallops. Distal pulses are 2+ and symmetric.  Pulmonary/Chest: No respiratory distress. Clear to auscultation bilaterally. No wheezing or rales.  Abdominal: Soft and non-distended.  There is no tenderness.  No rebound, guarding, or rigidity.  Musculoskeletal: Moves all extremities. No obvious deformities. No edema.  Skin: Warm and dry.  Neurological:  Alert, awake, and appropriate.  Normal speech.  No acute focal neurological deficits are appreciated.  Psychiatric: Normal affect. Good eye contact. Appropriate in content.    ED Course    Critical Care    Date/Time: 11/13/2023 5:12 PM    Performed by: Lizzette Holder MD  Authorized by: Lizzette Holder MD  Direct patient critical care time: 13 minutes  Additional history critical care time: 7 minutes  Ordering / reviewing critical care time: 6  minutes  Documentation critical care time: 7 minutes  Consulting other physicians critical care time: 9 minutes  Total critical care time (exclusive of procedural time) : 42 minutes  Critical care time was exclusive of separately billable procedures and treating other patients and teaching time.  Critical care was necessary to treat or prevent imminent or life-threatening deterioration of the following conditions: respiratory failure, renal failure and cardiac failure (pulmonary edema and hypoxemic respiratory failure).  Critical care was time spent personally by me on the following activities: blood draw for specimens, development of treatment plan with patient or surrogate, discussions with consultants, interpretation of cardiac output measurements, evaluation of patient's response to treatment, examination of patient, obtaining history from patient or surrogate, ordering and performing treatments and interventions, ordering and review of laboratory studies, ordering and review of radiographic studies, pulse oximetry, re-evaluation of patient's condition and review of old charts.        ED Vital Signs:  Vitals:    11/13/23 2300 11/13/23 2314 11/13/23 2330 11/14/23 0023   BP: (!) 192/85  (!) 178/76    Pulse: 68 71 69 69   Resp: 20 (!) 22 20    Temp:       TempSrc:       SpO2: (!) 89% 95% 97%    Weight:       Height:        11/14/23 0026 11/14/23 0033 11/14/23 0045 11/14/23 0323   BP: (!) 171/72  (!) 155/67    Pulse: 72 72 72 70   Resp: 20      Temp: 98.2 °F (36.8 °C)      TempSrc: Oral      SpO2: 99%      Weight:       Height:        11/14/23 0400 11/14/23 0447 11/14/23 0600 11/14/23 0806   BP:  (!) 103/43  127/60   Pulse: 68 72  68   Resp:  17  18   Temp:  98.9 °F (37.2 °C)  98.6 °F (37 °C)   TempSrc:    Oral   SpO2:  (!) 92%  100%   Weight:   60.9 kg (134 lb 4.2 oz)    Height:        11/14/23 0900 11/14/23 0934 11/14/23 1159   BP:   135/64   Pulse:  69 67   Resp:   18   Temp:   97.8 °F (36.6 °C)   TempSrc:   Oral  "  SpO2:   95%   Weight: 60.9 kg (134 lb 4.2 oz)     Height: 5' 5" (1.651 m)         Abnormal Lab Results:  Labs Reviewed   CBC W/ AUTO DIFFERENTIAL - Abnormal; Notable for the following components:       Result Value    RBC 3.59 (*)     Hemoglobin 11.4 (*)      (*)     MCH 31.8 (*)     MCHC 30.7 (*)     Lymph # 0.7 (*)     Gran % 85.3 (*)     Lymph % 8.8 (*)     All other components within normal limits   COMPREHENSIVE METABOLIC PANEL - Abnormal; Notable for the following components:    CO2 20 (*)     Glucose 114 (*)     BUN 47 (*)     Creatinine 8.3 (*)     eGFR 5 (*)     Anion Gap 20 (*)     All other components within normal limits   TROPONIN I - Abnormal; Notable for the following components:    Troponin I 0.130 (*)     All other components within normal limits   B-TYPE NATRIURETIC PEPTIDE - Abnormal; Notable for the following components:    BNP 3,585 (*)     All other components within normal limits   POCT GLUCOSE - Abnormal; Notable for the following components:    POCT Glucose 116 (*)     All other components within normal limits   POCT GLUCOSE - Abnormal; Notable for the following components:    POCT Glucose 52 (*)     All other components within normal limits   INFLUENZA A & B BY MOLECULAR   SARS-COV-2 RNA AMPLIFICATION, QUAL   POCT GLUCOSE, HAND-HELD DEVICE   POCT GLUCOSE   POCT GLUCOSE MONITORING CONTINUOUS        All Lab Results:  Results for orders placed or performed during the hospital encounter of 11/13/23   Influenza A & B by Molecular    Specimen: Nasopharyngeal Swab   Result Value Ref Range    Influenza A, Molecular Negative Negative    Influenza B, Molecular Negative Negative    Flu A & B Source Nasal swab    CBC auto differential   Result Value Ref Range    WBC 7.75 3.90 - 12.70 K/uL    RBC 3.59 (L) 4.00 - 5.40 M/uL    Hemoglobin 11.4 (L) 12.0 - 16.0 g/dL    Hematocrit 37.1 37.0 - 48.5 %     (H) 82 - 98 fL    MCH 31.8 (H) 27.0 - 31.0 pg    MCHC 30.7 (L) 32.0 - 36.0 g/dL    RDW 13.6 " 11.5 - 14.5 %    Platelets 193 150 - 450 K/uL    MPV 9.8 9.2 - 12.9 fL    Immature Granulocytes 0.3 0.0 - 0.5 %    Gran # (ANC) 6.6 1.8 - 7.7 K/uL    Immature Grans (Abs) 0.02 0.00 - 0.04 K/uL    Lymph # 0.7 (L) 1.0 - 4.8 K/uL    Mono # 0.4 0.3 - 1.0 K/uL    Eos # 0.1 0.0 - 0.5 K/uL    Baso # 0.01 0.00 - 0.20 K/uL    nRBC 0 0 /100 WBC    Gran % 85.3 (H) 38.0 - 73.0 %    Lymph % 8.8 (L) 18.0 - 48.0 %    Mono % 4.6 4.0 - 15.0 %    Eosinophil % 0.9 0.0 - 8.0 %    Basophil % 0.1 0.0 - 1.9 %    Differential Method Automated    Comprehensive metabolic panel   Result Value Ref Range    Sodium 143 136 - 145 mmol/L    Potassium 4.5 3.5 - 5.1 mmol/L    Chloride 103 95 - 110 mmol/L    CO2 20 (L) 23 - 29 mmol/L    Glucose 114 (H) 70 - 110 mg/dL    BUN 47 (H) 8 - 23 mg/dL    Creatinine 8.3 (H) 0.5 - 1.4 mg/dL    Calcium 10.3 8.7 - 10.5 mg/dL    Total Protein 7.7 6.0 - 8.4 g/dL    Albumin 3.8 3.5 - 5.2 g/dL    Total Bilirubin 0.3 0.1 - 1.0 mg/dL    Alkaline Phosphatase 98 55 - 135 U/L    AST 22 10 - 40 U/L    ALT 20 10 - 44 U/L    eGFR 5 (A) >60 mL/min/1.73 m^2    Anion Gap 20 (H) 8 - 16 mmol/L   Troponin I #1   Result Value Ref Range    Troponin I 0.130 (H) 0.000 - 0.026 ng/mL   BNP   Result Value Ref Range    BNP 3,585 (H) 0 - 99 pg/mL   COVID-19 Rapid Screening   Result Value Ref Range    SARS-CoV-2 RNA, Amplification, Qual Negative Negative   Troponin I   Result Value Ref Range    Troponin I 0.120 (H) 0.000 - 0.026 ng/mL   Procalcitonin   Result Value Ref Range    Procalcitonin 0.78 (H) <0.25 ng/mL   CK   Result Value Ref Range    CPK 69 20 - 180 U/L   Troponin I   Result Value Ref Range    Troponin I 0.124 (H) 0.000 - 0.026 ng/mL   Comprehensive Metabolic Panel (CMP)   Result Value Ref Range    Sodium 137 136 - 145 mmol/L    Potassium 3.9 3.5 - 5.1 mmol/L    Chloride 96 95 - 110 mmol/L    CO2 28 23 - 29 mmol/L    Glucose 197 (H) 70 - 110 mg/dL    BUN 18 8 - 23 mg/dL    Creatinine 4.5 (H) 0.5 - 1.4 mg/dL    Calcium 8.8 8.7 -  10.5 mg/dL    Total Protein 6.0 6.0 - 8.4 g/dL    Albumin 3.0 (L) 3.5 - 5.2 g/dL    Total Bilirubin 0.5 0.1 - 1.0 mg/dL    Alkaline Phosphatase 76 55 - 135 U/L    AST 14 10 - 40 U/L    ALT 15 10 - 44 U/L    eGFR 10 (A) >60 mL/min/1.73 m^2    Anion Gap 13 8 - 16 mmol/L   Magnesium   Result Value Ref Range    Magnesium 1.8 1.6 - 2.6 mg/dL   Phosphorus   Result Value Ref Range    Phosphorus 2.5 (L) 2.7 - 4.5 mg/dL   CBC with Automated Differential   Result Value Ref Range    WBC 5.89 3.90 - 12.70 K/uL    RBC 3.25 (L) 4.00 - 5.40 M/uL    Hemoglobin 10.2 (L) 12.0 - 16.0 g/dL    Hematocrit 32.3 (L) 37.0 - 48.5 %    MCV 99 (H) 82 - 98 fL    MCH 31.4 (H) 27.0 - 31.0 pg    MCHC 31.6 (L) 32.0 - 36.0 g/dL    RDW 13.5 11.5 - 14.5 %    Platelets 190 150 - 450 K/uL    MPV 10.0 9.2 - 12.9 fL    Immature Granulocytes 0.2 0.0 - 0.5 %    Gran # (ANC) 4.5 1.8 - 7.7 K/uL    Immature Grans (Abs) 0.01 0.00 - 0.04 K/uL    Lymph # 0.8 (L) 1.0 - 4.8 K/uL    Mono # 0.5 0.3 - 1.0 K/uL    Eos # 0.1 0.0 - 0.5 K/uL    Baso # 0.01 0.00 - 0.20 K/uL    nRBC 0 0 /100 WBC    Gran % 76.3 (H) 38.0 - 73.0 %    Lymph % 13.8 (L) 18.0 - 48.0 %    Mono % 7.6 4.0 - 15.0 %    Eosinophil % 1.9 0.0 - 8.0 %    Basophil % 0.2 0.0 - 1.9 %    Differential Method Automated    Troponin I   Result Value Ref Range    Troponin I 0.111 (H) 0.000 - 0.026 ng/mL   POCT glucose   Result Value Ref Range    POCT Glucose 116 (H) 70 - 110 mg/dL   POCT glucose   Result Value Ref Range    POCT Glucose 52 (L) 70 - 110 mg/dL   POCT glucose   Result Value Ref Range    POCT Glucose 77 70 - 110 mg/dL   POCT glucose   Result Value Ref Range    POCT Glucose 186 (H) 70 - 110 mg/dL   POCT glucose   Result Value Ref Range    POCT Glucose 187 (H) 70 - 110 mg/dL   POCT glucose   Result Value Ref Range    POCT Glucose 251 (H) 70 - 110 mg/dL     *Note: Due to a large number of results and/or encounters for the requested time period, some results have not been displayed. A complete set of  results can be found in Results Review.         Imaging Results:  Imaging Results              X-Ray Chest AP Portable (Final result)  Result time 11/13/23 15:53:13      Final result by Terry Rg III, MD (11/13/23 15:53:13)                   Impression:      Cardiomegaly    Findings suggesting developing pulmonary venous hypertension and a developing left pleural effusion      Electronically signed by: Noman Rg  Date:    11/13/2023  Time:    15:53               Narrative:    EXAMINATION:  XR CHEST AP PORTABLE    CLINICAL HISTORY:  shortness of breath;    TECHNIQUE:  Single frontal view of the chest was performed.    COMPARISON:  09/07/2023    FINDINGS:  Midline sternotomy wires.  The cardiac silhouette is enlarged but unchanged.  The mediastinum is unremarkable.  Developing fluid within the minor fissure.    Blunting of the left costophrenic angle suggesting a developing pleural effusion.  Bilateral perihilar indistinctness along with developing interstitial and ground-glass opacity suggesting pulmonary venous hypertension.                                     The EKG was ordered, reviewed, and independently interpreted by the ED provider.  Interpretation time: 15:57  Rate: 64 BPM  Rhythm: normal sinus rhythm  Interpretation: Voltage criteria for left ventricular hypertrophy (R in aVL, Sokolow-Caban, Morgan product). T wave abnormality, consider anterior ischemia. Prolonged QT. No STEMI.  When compared to EKG performed on 8/9/23, there are no significant changes.               The Emergency Provider reviewed the vital signs and test results, which are outlined above.    ED Discussion     5:07 PM: Discussed pt's case with Dr. Hanson (Nephrology) who recommends admission for dialysis.    5:18 PM: Discussed pt's case with Dr. Hanson (Nephrology) who recommends discharging the pt after dialysis.    5:20 PM: Discussed pt's case with Dr. Dunlap (Hospital Medicine) who states that the pt does not need  to be admitted.    7:13 PM: Discussed pt's case with Dr. Hanson (Nephrology) who states that he has evaluated the pt and would like her to be placed in observation.    8:03 PM: Discussed case with Dr. Pisano ([facility] Medicine). Dr. Pisano agrees with current care and management of pt and accepts admission.   Admitting Service: [facility]  Admitting Physician: Dr. Pisano  Admit to: Inpatient Med Tele    8:04 PM: Re-evaluated pt. I have discussed test results, shared treatment plan, and the need for admission with patient and family at bedside. Pt and family express understanding at this time and agree with all information. All questions answered. Pt and family have no further questions or concerns at this time. Pt is ready for admit.           ED Medication(s):  Medications   aspirin EC tablet 81 mg (81 mg Oral Given 11/14/23 0810)   folic acid tablet 1 mg (1 mg Oral Given 11/14/23 0810)   metoprolol tartrate (LOPRESSOR) tablet 50 mg (50 mg Oral Given 11/14/23 0810)   pantoprazole EC tablet 40 mg (40 mg Oral Given 11/14/23 0810)   sevelamer carbonate tablet 1,600 mg (1,600 mg Oral Given 11/14/23 1135)   losartan tablet 25 mg (25 mg Oral Given 11/14/23 0810)   sodium chloride 0.9% flush 10 mL (has no administration in time range)   albuterol-ipratropium 2.5 mg-0.5 mg/3 mL nebulizer solution 3 mL (has no administration in time range)   melatonin tablet 6 mg (has no administration in time range)   ondansetron injection 4 mg (has no administration in time range)   prochlorperazine injection Soln 5 mg (has no administration in time range)   polyethylene glycol packet 17 g (has no administration in time range)   senna-docusate 8.6-50 mg per tablet 1 tablet (1 tablet Oral Given 11/14/23 0810)   simethicone chewable tablet 80 mg (has no administration in time range)   acetaminophen tablet 650 mg (has no administration in time range)   HYDROcodone-acetaminophen 5-325 mg per tablet 1 tablet (has no administration in  time range)   naloxone 0.4 mg/mL injection 0.02 mg (has no administration in time range)   glucose chewable tablet 16 g (has no administration in time range)   glucose chewable tablet 24 g (has no administration in time range)   glucagon (human recombinant) injection 1 mg (has no administration in time range)   heparin (porcine) injection 5,000 Units (5,000 Units Subcutaneous Not Given 11/14/23 1400)   insulin aspart U-100 pen 0-5 Units (3 Units Subcutaneous Given 11/14/23 1142)   dextrose 10% bolus 125 mL 125 mL (has no administration in time range)   dextrose 10% bolus 250 mL 250 mL (has no administration in time range)   pravastatin tablet 20 mg (has no administration in time range)        Follow-up Information       Rose Germain MD. Schedule an appointment as soon as possible for a visit in 3 day(s).    Specialty: Internal Medicine  Why: hospital follow up  Contact information:  [address]  [facility]  [place] [address]  [phone]               Angela Hanson MD. Schedule an appointment as soon as possible for a visit in 1 week(s).    Specialty: Nephrology  Why: hospital follow up  Contact information:  [id number]  [place] [address]  [phone]                             Discharge Medication List as of 11/14/2023 12:44 PM            Medical Decision Making    Medical Decision Making  DDx:  1. Pulmonary Edema  2. Covid  3. Influenza  4. Pneumonia  5. ACS    77 y/o female patient of yours with ESRD on dialysis M,W,F, last dialyzed on Friday; missed dialysis this morning b/c she was feeling poorly and had a cough, then had a hypoglycemic event while at home this afternoon and EMS was called and now she is in the ER. CXR reviewed and shows extensive pulmonary edema, patient placed on 2 liters NC, nephrology consulted for emergent dialysis, lab work reviewed and at baseline otherwise, flu and covid negative, ECG unchanged from baseline, hospital medicine consulted initially for  admission but declined admit and thought patient could be discharged after dialysis, Dr. Hanson evaluated patient while patient was being dialyzed and now feels she needs admission due to persistent shortness of breath requiring oxygen support, hospital medicine reconsulted for admit.     Amount and/or Complexity of Data Reviewed  Labs: ordered. Decision-making details documented in ED Course.  Radiology: ordered. Decision-making details documented in ED Course.  ECG/medicine tests: ordered and independent interpretation performed. Decision-making details documented in ED Course.    Risk  Decision regarding hospitalization.                Scribe Attestation:   Scribe #1: I performed the above scribed service and the documentation accurately describes the services I performed. I attest to the accuracy of the note.    Attending:   Physician Attestation Statement for Scribe #1: I, Lizzette Holder MD, personally performed the services described in this documentation, as scribed by Paige Mcgill, in my presence, and it is both accurate and complete.          Clinical Impression       ICD-10-CM ICD-9-CM   1. Acute hypoxemic respiratory failure  J96.01 518.81   2. Shortness of breath  R06.02 786.05   3. ESRD (end stage renal disease) on dialysis  N18.6 585.6    Z99.2 V45.11   4. Acute pulmonary edema  J81.0 518.4   5. Chest pain  R07.9 786.50       Disposition:   Disposition: Admitted  Condition: Lizzette Chase MD  11/1945

## 2023-11-14 VITALS
BODY MASS INDEX: 22.37 KG/M2 | HEART RATE: 67 BPM | OXYGEN SATURATION: 95 % | SYSTOLIC BLOOD PRESSURE: 135 MMHG | WEIGHT: 134.25 LBS | DIASTOLIC BLOOD PRESSURE: 64 MMHG | RESPIRATION RATE: 18 BRPM | TEMPERATURE: 98 F | HEIGHT: 65 IN

## 2023-11-14 PROBLEM — E16.2 HYPOGLYCEMIA: Status: RESOLVED | Noted: 2023-11-14 | Resolved: 2023-11-14

## 2023-11-14 PROBLEM — I50.33 ACUTE ON CHRONIC DIASTOLIC CONGESTIVE HEART FAILURE: Status: ACTIVE | Noted: 2023-11-14

## 2023-11-14 PROBLEM — I50.33 ACUTE ON CHRONIC DIASTOLIC CONGESTIVE HEART FAILURE: Status: RESOLVED | Noted: 2023-11-14 | Resolved: 2023-11-14

## 2023-11-14 PROBLEM — E16.2 HYPOGLYCEMIA: Status: ACTIVE | Noted: 2023-11-14

## 2023-11-14 LAB
ALBUMIN SERPL BCP-MCNC: 3 G/DL (ref 3.5–5.2)
ALP SERPL-CCNC: 76 U/L (ref 55–135)
ALT SERPL W/O P-5'-P-CCNC: 15 U/L (ref 10–44)
ANION GAP SERPL CALC-SCNC: 13 MMOL/L (ref 8–16)
AST SERPL-CCNC: 14 U/L (ref 10–40)
BASOPHILS # BLD AUTO: 0.01 K/UL (ref 0–0.2)
BASOPHILS NFR BLD: 0.2 % (ref 0–1.9)
BILIRUB SERPL-MCNC: 0.5 MG/DL (ref 0.1–1)
BUN SERPL-MCNC: 18 MG/DL (ref 8–23)
CALCIUM SERPL-MCNC: 8.8 MG/DL (ref 8.7–10.5)
CHLORIDE SERPL-SCNC: 96 MMOL/L (ref 95–110)
CK SERPL-CCNC: 69 U/L (ref 20–180)
CO2 SERPL-SCNC: 28 MMOL/L (ref 23–29)
CREAT SERPL-MCNC: 4.5 MG/DL (ref 0.5–1.4)
DIFFERENTIAL METHOD: ABNORMAL
EOSINOPHIL # BLD AUTO: 0.1 K/UL (ref 0–0.5)
EOSINOPHIL NFR BLD: 1.9 % (ref 0–8)
ERYTHROCYTE [DISTWIDTH] IN BLOOD BY AUTOMATED COUNT: 13.5 % (ref 11.5–14.5)
EST. GFR  (NO RACE VARIABLE): 10 ML/MIN/1.73 M^2
GLUCOSE SERPL-MCNC: 197 MG/DL (ref 70–110)
HCT VFR BLD AUTO: 32.3 % (ref 37–48.5)
HGB BLD-MCNC: 10.2 G/DL (ref 12–16)
IMM GRANULOCYTES # BLD AUTO: 0.01 K/UL (ref 0–0.04)
IMM GRANULOCYTES NFR BLD AUTO: 0.2 % (ref 0–0.5)
LYMPHOCYTES # BLD AUTO: 0.8 K/UL (ref 1–4.8)
LYMPHOCYTES NFR BLD: 13.8 % (ref 18–48)
MAGNESIUM SERPL-MCNC: 1.8 MG/DL (ref 1.6–2.6)
MCH RBC QN AUTO: 31.4 PG (ref 27–31)
MCHC RBC AUTO-ENTMCNC: 31.6 G/DL (ref 32–36)
MCV RBC AUTO: 99 FL (ref 82–98)
MONOCYTES # BLD AUTO: 0.5 K/UL (ref 0.3–1)
MONOCYTES NFR BLD: 7.6 % (ref 4–15)
NEUTROPHILS # BLD AUTO: 4.5 K/UL (ref 1.8–7.7)
NEUTROPHILS NFR BLD: 76.3 % (ref 38–73)
NRBC BLD-RTO: 0 /100 WBC
PHOSPHATE SERPL-MCNC: 2.5 MG/DL (ref 2.7–4.5)
PLATELET # BLD AUTO: 190 K/UL (ref 150–450)
PMV BLD AUTO: 10 FL (ref 9.2–12.9)
POCT GLUCOSE: 186 MG/DL (ref 70–110)
POCT GLUCOSE: 187 MG/DL (ref 70–110)
POCT GLUCOSE: 251 MG/DL (ref 70–110)
POTASSIUM SERPL-SCNC: 3.9 MMOL/L (ref 3.5–5.1)
PROCALCITONIN SERPL IA-MCNC: 0.78 NG/ML
PROT SERPL-MCNC: 6 G/DL (ref 6–8.4)
RBC # BLD AUTO: 3.25 M/UL (ref 4–5.4)
SODIUM SERPL-SCNC: 137 MMOL/L (ref 136–145)
TROPONIN I SERPL DL<=0.01 NG/ML-MCNC: 0.11 NG/ML (ref 0–0.03)
TROPONIN I SERPL DL<=0.01 NG/ML-MCNC: 0.12 NG/ML (ref 0–0.03)
TROPONIN I SERPL DL<=0.01 NG/ML-MCNC: 0.12 NG/ML (ref 0–0.03)
WBC # BLD AUTO: 5.89 K/UL (ref 3.9–12.7)

## 2023-11-14 PROCEDURE — 84484 ASSAY OF TROPONIN QUANT: CPT | Mod: 91,HCNC | Performed by: NURSE PRACTITIONER

## 2023-11-14 PROCEDURE — 97161 PT EVAL LOW COMPLEX 20 MIN: CPT | Mod: HCNC

## 2023-11-14 PROCEDURE — 84484 ASSAY OF TROPONIN QUANT: CPT | Mod: HCNC | Performed by: NURSE PRACTITIONER

## 2023-11-14 PROCEDURE — 99233 PR SUBSEQUENT HOSPITAL CARE,LEVL III: ICD-10-PCS | Mod: HCNC,,, | Performed by: INTERNAL MEDICINE

## 2023-11-14 PROCEDURE — 84100 ASSAY OF PHOSPHORUS: CPT | Mod: HCNC | Performed by: NURSE PRACTITIONER

## 2023-11-14 PROCEDURE — 63600175 PHARM REV CODE 636 W HCPCS: Mod: HCNC | Performed by: NURSE PRACTITIONER

## 2023-11-14 PROCEDURE — 85025 COMPLETE CBC W/AUTO DIFF WBC: CPT | Mod: HCNC | Performed by: NURSE PRACTITIONER

## 2023-11-14 PROCEDURE — 97530 THERAPEUTIC ACTIVITIES: CPT | Mod: HCNC

## 2023-11-14 PROCEDURE — 25000003 PHARM REV CODE 250: Mod: HCNC | Performed by: NURSE PRACTITIONER

## 2023-11-14 PROCEDURE — 84145 PROCALCITONIN (PCT): CPT | Mod: HCNC | Performed by: NURSE PRACTITIONER

## 2023-11-14 PROCEDURE — 97165 OT EVAL LOW COMPLEX 30 MIN: CPT | Mod: HCNC

## 2023-11-14 PROCEDURE — 36415 COLL VENOUS BLD VENIPUNCTURE: CPT | Mod: HCNC | Performed by: NURSE PRACTITIONER

## 2023-11-14 PROCEDURE — 99233 SBSQ HOSP IP/OBS HIGH 50: CPT | Mod: HCNC,,, | Performed by: INTERNAL MEDICINE

## 2023-11-14 PROCEDURE — 80053 COMPREHEN METABOLIC PANEL: CPT | Mod: HCNC | Performed by: NURSE PRACTITIONER

## 2023-11-14 PROCEDURE — 83735 ASSAY OF MAGNESIUM: CPT | Mod: HCNC | Performed by: NURSE PRACTITIONER

## 2023-11-14 PROCEDURE — 82550 ASSAY OF CK (CPK): CPT | Mod: HCNC | Performed by: NURSE PRACTITIONER

## 2023-11-14 RX ORDER — PRAVASTATIN SODIUM 20 MG/1
20 TABLET ORAL NIGHTLY
Status: DISCONTINUED | OUTPATIENT
Start: 2023-11-14 | End: 2023-11-14 | Stop reason: HOSPADM

## 2023-11-14 RX ADMIN — SEVELAMER CARBONATE 1600 MG: 800 TABLET, FILM COATED ORAL at 08:11

## 2023-11-14 RX ADMIN — SEVELAMER CARBONATE 1600 MG: 800 TABLET, FILM COATED ORAL at 11:11

## 2023-11-14 RX ADMIN — METOPROLOL TARTRATE 50 MG: 50 TABLET, FILM COATED ORAL at 08:11

## 2023-11-14 RX ADMIN — SENNOSIDES AND DOCUSATE SODIUM 1 TABLET: 50; 8.6 TABLET ORAL at 08:11

## 2023-11-14 RX ADMIN — INSULIN ASPART 3 UNITS: 100 INJECTION, SOLUTION INTRAVENOUS; SUBCUTANEOUS at 11:11

## 2023-11-14 RX ADMIN — HEPARIN SODIUM 5000 UNITS: 5000 INJECTION INTRAVENOUS; SUBCUTANEOUS at 05:11

## 2023-11-14 RX ADMIN — PANTOPRAZOLE SODIUM 40 MG: 40 TABLET, DELAYED RELEASE ORAL at 08:11

## 2023-11-14 RX ADMIN — LOSARTAN POTASSIUM 25 MG: 25 TABLET, FILM COATED ORAL at 08:11

## 2023-11-14 RX ADMIN — FOLIC ACID 1 MG: 1 TABLET ORAL at 08:11

## 2023-11-14 RX ADMIN — ASPIRIN 81 MG: 81 TABLET, COATED ORAL at 08:11

## 2023-11-14 NOTE — SUBJECTIVE & OBJECTIVE
Past Medical History:   Diagnosis Date    Acute hypoxemic respiratory failure 11/26/2018    Anemia     Arthritis     back, hand, knees    Back pain     Cataract     CKD stage G4/A3, GFR 15 - 29 and albumin creatinine ratio >300 mg/g     GFR 40% Jan 2014 and 33% ub 3/2014 (BRG)    Coronary artery disease involving native coronary artery of native heart 11/30/2018    Diabetic retinopathy     DM (diabetes mellitus) type II controlled, neurological manifestation     Exudative age-related macular degeneration of left eye with active choroidal neovascularization 2/5/2019    GERD (gastroesophageal reflux disease)     Glaucoma     Heart failure     Hyperlipidemia     Hypertension     Non-ST elevation MI (NSTEMI) 11/28/2018    NSTEMI (non-ST elevated myocardial infarction) 11/27/2018    Peripheral neuropathy     Polyneuropathy     Proteinuria     >6 mo     Seizures     BRG 1/2014 -dick CT NRI showed small vessel    Skin ulcer of toe of right foot with fat layer exposed 10/14/2022    Stroke 2012,2014    Tobacco dependence     Type 2 diabetes with peripheral circulatory disorder, controlled        Past Surgical History:   Procedure Laterality Date    BREAST LUMPECTOMY Left     benign, when patient was 15 years old    BREAST MASS EXCISION      ,benign, age 15.    CATHETERIZATION OF BOTH LEFT AND RIGHT HEART N/A 11/28/2018    Procedure: CATHETERIZATION, HEART, BOTH LEFT AND RIGHT;  Surgeon: Michelle Holman MD;  Location: [facility] CATH LAB;  Service: Cardiology;  Laterality: N/A;    CATHETERIZATION OF BOTH LEFT AND RIGHT HEART N/A 11/30/2018    Procedure: CATHETERIZATION, HEART, BOTH LEFT AND RIGHT;  Surgeon: Michelle Holman MD;  Location: [facility] CATH LAB;  Service: Cardiology;  Laterality: N/A;    CORONARY ARTERY BYPASS GRAFT      CORONARY ARTERY BYPASS GRAFT (CABG) N/A 7/20/2022    Procedure: CORONARY ARTERY BYPASS GRAFT (CABG);  Surgeon: Sanju Locke MD;  Location: [facility] OR;  Service: Cardiothoracic;  Laterality: N/A;  2-VESSEL  PUMP ASSIST WITH EPI-AORTIC ULTRASOUND    ENDOSCOPIC HARVEST OF VEIN Left 7/20/2022    Procedure: SURGICAL PROCUREMENT, VEIN, ENDOSCOPIC;  Surgeon: Sanju Locke MD;  Location: [facility] OR;  Service: Cardiothoracic;  Laterality: Left;    HYSTERECTOMY  1982    INJECTION OF ANESTHETIC AGENT AROUND MULTIPLE INTERCOSTAL NERVES N/A 7/20/2022    Procedure: BLOCK, NERVE, INTERCOSTAL, 2 OR MORE;  Surgeon: Sanju Locke MD;  Location: [facility] OR;  Service: Cardiothoracic;  Laterality: N/A;  PARASTERNAL NERVE BLOCK    INSERTION OF SHUNT      LEFT HEART CATHETERIZATION Left 12/3/2018    Procedure: CATHETERIZATION, HEART, LEFT;  Surgeon: Michelle Holman MD;  Location: [facility] CATH LAB;  Service: Cardiology;  Laterality: Left;  LAD ATHERECTOMY STENT/ FEMORAL APPROACH    LEFT HEART CATHETERIZATION Left 7/13/2022    Procedure: CATHETERIZATION, HEART, LEFT;  Surgeon: Abhi Sloan MD;  Location: [facility] CATH LAB;  Service: Cardiology;  Laterality: Left;    OOPHORECTOMY      wrist cyst Right 1980s    dorsal wrist cyst       Review of patient's allergies indicates:   Allergen Reactions    Norvasc [amlodipine] Shortness Of Breath    Codeine Swelling    Darvon [propoxyphene] Swelling    Atorvastatin      Muscle twitching     No current facility-administered medications for this encounter.     Current Outpatient Medications   Medication    aspirin (ECOTRIN) 81 MG EC tablet    atropine 1% (ISOPTO ATROPINE) 1 % Drop    cyanocobalamin (VITAMIN B-12) 1000 MCG tablet    folic acid (FOLVITE) 1 MG tablet    insulin aspart protamine-insulin aspart (NOVOLOG MIX 70-30FLEXPEN U-100) 100 unit/mL (70-30) InPn pen    LIDOcaine (LIDODERM) 5 %    loratadine (CLARITIN) 10 mg tablet    metoprolol tartrate (LOPRESSOR) 50 MG tablet    pantoprazole (PROTONIX) 40 MG tablet    prednisoLONE acetate (PRED FORTE) 1 % DrpS    rosuvastatin (CRESTOR) 20 MG tablet    sevelamer carbonate (RENVELA) 800 mg Tab    telmisartan (MICARDIS) 20 MG Tab    amLODIPine  "(NORVASC) 5 MG tablet    azelastine (ASTELIN) 137 mcg (0.1 %) nasal spray    blood sugar diagnostic (TRUE METRIX GLUCOSE TEST STRIP) Strp    ondansetron (ZOFRAN-ODT) 4 MG TbDL    pen needle, diabetic 32 gauge x " Ndle    traMADoL (ULTRAM) 50 mg tablet     Family History       Problem Relation (Age of Onset)    Cancer Maternal Grandmother    Diabetes Mother    Heart disease Mother    Hyperlipidemia Mother    Hypertension Mother    Muscular dystrophy Son          Tobacco Use    Smoking status: Former     Current packs/day: 0.00     Average packs/day: 1.5 packs/day for 30.0 years (45.0 ttl pk-yrs)     Types: Cigarettes     Start date: 1960     Quit date: 1990     Years since quittin.8    Smokeless tobacco: Former   Substance and Sexual Activity    Alcohol use: No     Alcohol/week: 0.0 standard drinks of alcohol    Drug use: No    Sexual activity: Not Currently     Review of Systems   Constitutional: Negative.    HENT:  Positive for congestion and sinus pressure.    Respiratory:  Positive for shortness of breath.    Cardiovascular: Negative.    Gastrointestinal: Negative.    Genitourinary: Negative.    Musculoskeletal: Negative.    Neurological: Negative.    Psychiatric/Behavioral: Negative.       Objective:     Vital Signs (Most Recent):  Temp: 97.7 °F (36.5 °C) (23 1447)  Pulse: 66 (23 1700)  Resp: 20 (23 1700)  BP: (!) 148/107 (23 1700)  SpO2: 100 % (23 1700) Vital Signs (24h Range):  Temp:  [97.7 °F (36.5 °C)] 97.7 °F (36.5 °C)  Pulse:  [65-66] 66  Resp:  [16-22] 20  SpO2:  [87 %-100 %] 100 %  BP: (148-171)/() 148/107     Weight: 70.1 kg (154 lb 8.7 oz) (23 1634)  Body mass index is 25.72 kg/m².  Body surface area is 1.79 meters squared.    No intake/output data recorded.     Physical Exam  Vitals and nursing note reviewed.   Constitutional:       Appearance: Normal appearance.   Cardiovascular:      Rate and Rhythm: Normal rate and regular rhythm.      " Pulses: Normal pulses.      Heart sounds: Normal heart sounds.   Pulmonary:      Effort: Pulmonary effort is normal.      Breath sounds: Rales present.   Abdominal:      Palpations: Abdomen is soft.      Tenderness: There is no abdominal tenderness.   Musculoskeletal:      Right lower leg: Edema present.      Left lower leg: No edema.   Neurological:      Mental Status: She is alert and oriented to person, place, and time.   Psychiatric:         Behavior: Behavior normal.          Significant Labs: reviewed  BMP  Lab Results   Component Value Date     11/13/2023    K 4.5 11/13/2023     11/13/2023    CO2 20 (L) 11/13/2023    BUN 47 (H) 11/13/2023    CREATININE 8.3 (H) 11/13/2023    CALCIUM 10.3 11/13/2023    ANIONGAP 20 (H) 11/13/2023    EGFRNORACEVR 5 (A) 11/13/2023     Lab Results   Component Value Date    WBC 7.75 11/13/2023    HGB 11.4 (L) 11/13/2023    HCT 37.1 11/13/2023     (H) 11/13/2023     11/13/2023       Significant Imaging: CXR reviewed, has pulmonary edema

## 2023-11-14 NOTE — PLAN OF CARE
[facility] - Med Surg  Discharge Final Note    Primary Care Provider: Rose Germain MD    Expected Discharge Date: 11/14/2023    Final Discharge Note (most recent)       Final Note - 11/14/23 1238          Final Note    Assessment Type Final Discharge Note     Anticipated Discharge Disposition Home or Self Care     Hospital Resources/Appts/Education Provided Appointments scheduled and added to AVS                     Important Message from Medicare             Contact Info       Rose Germain MD   Specialty: Internal Medicine   Relationship: PCP - General    [address] [place] [address]  [place] [address]   Phone: [phone]       Next Steps: Schedule an appointment as soon as possible for a visit in 3 day(s)    Instructions: hospital follow up    Angela Hanson MD   Specialty: Nephrology   Relationship: Consulting Physician    [facility] [address] [place] [facility] [place] [address]   Phone: [phone]       Next Steps: Schedule an appointment as soon as possible for a visit in 1 week(s)    Instructions: hospital follow up          DC Dispo: home  Transport: private vehicle  PCP f/u: Nov 21st

## 2023-11-14 NOTE — SUBJECTIVE & OBJECTIVE
"Interval History: Pt was seen and examined. Labs and meds reviewed. Discussed with other providers.  Pt feels "a lot better", no further SOB, no new c/o's, no cough, no fever, no CP. Feels ready to go home.    Review of patient's allergies indicates:   Allergen Reactions    Norvasc [amlodipine] Shortness Of Breath    Codeine Swelling    Darvon [propoxyphene] Swelling    Atorvastatin      Muscle twitching     Current Facility-Administered Medications   Medication Frequency    acetaminophen tablet 650 mg Q4H PRN    albuterol-ipratropium 2.5 mg-0.5 mg/3 mL nebulizer solution 3 mL Q6H PRN    aspirin EC tablet 81 mg Daily    dextrose 10% bolus 125 mL 125 mL PRN    dextrose 10% bolus 250 mL 250 mL PRN    folic acid tablet 1 mg Daily    glucagon (human recombinant) injection 1 mg PRN    glucose chewable tablet 16 g PRN    glucose chewable tablet 24 g PRN    heparin (porcine) injection 5,000 Units Q8H    HYDROcodone-acetaminophen 5-325 mg per tablet 1 tablet Q6H PRN    insulin aspart U-100 pen 0-5 Units QID (AC + HS) PRN    losartan tablet 25 mg Daily    melatonin tablet 6 mg Nightly PRN    metoprolol tartrate (LOPRESSOR) tablet 50 mg BID    naloxone 0.4 mg/mL injection 0.02 mg PRN    ondansetron injection 4 mg Q6H PRN    pantoprazole EC tablet 40 mg Daily    polyethylene glycol packet 17 g BID PRN    pravastatin tablet 20 mg QHS    prochlorperazine injection Soln 5 mg Q6H PRN    senna-docusate 8.6-50 mg per tablet 1 tablet BID    sevelamer carbonate tablet 1,600 mg TID WM    simethicone chewable tablet 80 mg QID PRN    sodium chloride 0.9% flush 10 mL Q8H PRN       Objective:     Vital Signs (Most Recent):  Temp: 98.6 °F (37 °C) (11/14/23 0806)  Pulse: 69 (11/14/23 0934)  Resp: 18 (11/14/23 0806)  BP: 127/60 (11/14/23 0806)  SpO2: 100 % (11/14/23 0806) Vital Signs (24h Range):  Temp:  [97.7 °F (36.5 °C)-98.9 °F (37.2 °C)] 98.6 °F (37 °C)  Pulse:  [65-72] 69  Resp:  [16-22] 18  SpO2:  [87 %-100 %] 100 %  BP: " (103-192)/() 127/60     Weight: 60.9 kg (134 lb 4.2 oz) (11/14/23 0900)  Body mass index is 22.34 kg/m².  Body surface area is 1.67 meters squared.    I/O last 3 completed shifts:  In: 0   Out: 2500 [Other:2500]     Physical Exam  Vitals and nursing note reviewed.   Constitutional:       Appearance: Normal appearance.   Cardiovascular:      Rate and Rhythm: Normal rate and regular rhythm.   Pulmonary:      Effort: Pulmonary effort is normal.      Breath sounds: Normal breath sounds. No rales.   Abdominal:      General: Abdomen is flat.      Tenderness: There is no abdominal tenderness.   Musculoskeletal:      Right lower leg: No edema.      Left lower leg: No edema.   Neurological:      Mental Status: She is alert and oriented to person, place, and time.   Psychiatric:         Behavior: Behavior normal.          Significant Labs: Reviewed  BMP  Lab Results   Component Value Date     11/14/2023    K 3.9 11/14/2023    CL 96 11/14/2023    CO2 28 11/14/2023    BUN 18 11/14/2023    CREATININE 4.5 (H) 11/14/2023    CALCIUM 8.8 11/14/2023    ANIONGAP 13 11/14/2023    EGFRNORACEVR 10 (A) 11/14/2023     Lab Results   Component Value Date    WBC 5.89 11/14/2023    HGB 10.2 (L) 11/14/2023    HCT 32.3 (L) 11/14/2023    MCV 99 (H) 11/14/2023     11/14/2023         Significant Imaging: Reviewed CXR

## 2023-11-14 NOTE — PLAN OF CARE
Patient ready for discharge, IV removed as ordered with no complications. Cardiac monitor discontinued after notifying monitor room, hand delivered to monitor room per policy. Discharge instructions reviewed with the patient, verbalized understanding.

## 2023-11-14 NOTE — NURSING
11/13/23 2220   Post-Hemodialysis Assessment   Duration of Treatment 240 minutes   Total UF (mL) 2500 mL   Net Fluid Removal 2000   Patient Response to Treatment tolerated well   Post-Hemodialysis Comments No problems during treatment, Valentin visited during treatment, no bleeding upon departure

## 2023-11-14 NOTE — PLAN OF CARE
[facility] - Med [facility]  Initial Discharge Assessment       Primary Care Provider: Rose Germain MD    Admission Diagnosis: Shortness of breath [R06.02]  Acute pulmonary edema [J81.0]  ESRD (end stage renal disease) on dialysis [N18.6, Z99.2]  Chest pain [R07.9]  Acute hypoxemic respiratory failure [J96.01]    Admission Date: 11/13/2023  Expected Discharge Date: 11/14/2023    Transition of Care Barriers: Transportation    Payor: [email or web address] MEDICARE / Plan: [employer] HMO PPO SPECIAL NEEDS / Product Type: Medicare Advantage /     Extended Emergency Contact Information  Primary Emergency Contact: MonzonElizabeth  Address: [address] [place] [address] [facility]  Home Phone: [phone]  Mobile Phone: [phone]  Relation: Daughter  Secondary Emergency Contact: Myriam Don   [place]  Mobile Phone: [phone]  Relation: Sister    Discharge Plan A: Home with family         [facility] [address] [place] [address] [place] - [address]  [address]  [facility] [address]  Phone: [phone] Fax: [phone]    [facility] [address] [place] [address] [facility]  [address] [facility] [address]  Phone: [phone] Fax: [phone]    [facility] [address] [facility] [place] - [address]  [address]  [facility] [address]  Phone: [phone] Fax: [phone]      Initial Assessment (most recent)       Adult Discharge Assessment - 11/14/23 1235          Discharge Assessment    Assessment Type Discharge Planning Assessment     Confirmed/corrected address, phone number and insurance Yes     Confirmed Demographics Correct on Facesheet     Source of Information patient     Communicated MARIBELL with patient/caregiver Date not available/Unable to determine     Reason For Admission missed HD     People in Home child(robert), adult     Facility Arrived From: home     Do you expect to return  to your current living situation? Yes     Do you have help at home or someone to help you manage your care at home? Yes     Who are your caregiver(s) and their phone number(s)? daughterElizabeth     Prior to hospitilization cognitive status: Alert/Oriented     Current cognitive status: Alert/Oriented     Home Accessibility wheelchair accessible     Equipment Currently Used at Home glucometer;blood pressure machine     Readmission within 30 days? No     Patient currently being followed by outpatient case management? No     Do you currently have service(s) that help you manage your care at home? No     Do you take prescription medications? Yes     Do you have prescription coverage? Yes     Do you have any problems affording any of your prescribed medications? No     Is the patient taking medications as prescribed? yes     Who is going to help you get home at discharge? daughter     How do you get to doctors appointments? family or friend will provide;health plan transportation     Are you on dialysis? Yes     Dialysis Name and Scheduled days [facility]     DME Needed Upon Discharge  none     Discharge Plan discussed with: Patient     Transition of Care Barriers Transportation     Discharge Plan A Home with family                      Anticipated DC Dispo: home with daughter  Prior Level of Function: independent per patient  HD: MWF [facility]  Comments:  CM met with patient at bedside to introduce role and discuss d/c planning. Patient sitting up at side of bed eating, confirmed address/insurance/contact info. Patient states daughter is coming to pick her up via [employer] today.

## 2023-11-14 NOTE — ASSESSMENT & PLAN NOTE
Patient with known CAD s/p CABG, which is controlled Will continue  BB, ARB, , ASA, and Statin and monitor for S/Sx of angina/ACS. Continue to monitor on telemetry.

## 2023-11-14 NOTE — PT/OT/SLP EVAL
Occupational Therapy   Evaluation and Discharge Note    Name: Avril Monzon  MRN: [id number]  Admitting Diagnosis: Acute on chronic diastolic congestive heart failure  Recent Surgery: * No surgery found *      Recommendations:     Discharge Recommendations: Low Intensity Therapy  Discharge Equipment Recommendations: none  Barriers to discharge:       Assessment:     Avril Monzon is a 78 y.o. female with a medical diagnosis of Acute on chronic diastolic congestive heart failure. At this time, patient is functioning at their prior level of function and does not require further acute OT services.     Plan:     During this hospitalization, patient does not require further acute OT services.  Please re-consult if situation changes.    Plan of Care Reviewed with: patient    Subjective     Chief Complaint:   Patient/Family Comments/goals:     Occupational Profile:  Living Environment: LIVES WITH DAUGHTER IN 1 STORY HOUSE  Previous level of function: (I) WITH ADL'S AND FUNCTIONAL MOBILITY  Roles and Routines: CARED AT HOME  Equipment Used at home: blood pressure machine  Assistance upon Discharge:     Pain/Comfort:  Pain Rating 1: 0/10    Patients cultural, spiritual, Spiritism conflicts given the current situation:      Objective:     Communicated with: NURSE AND [facility] CAHRT REVIEW prior to session.  Patient found HOB elevated with telemetry upon OT entry to room.    General Precautions: Standard, fall  Orthopedic Precautions: N/A  Braces: N/A  Respiratory Status: Room air     Occupational Performance:    Bed Mobility:    Patient completed Rolling/Turning to Left with  independence  Patient completed Rolling/Turning to Right with independence  Patient completed Scooting/Bridging with independence  Patient completed Supine to Sit with independence  Patient completed Sit to Supine with independence    Functional Mobility/Transfers:  Patient completed Sit <> Stand Transfer with independence  with  no assistive device    Functional Mobility: (I) X 20 FEET WITH NO AE     Activities of Daily Living:  Upper Body Dressing: independence .  Lower Body Dressing: independence .    Cognitive/Visual Perceptual:  Cognitive/Psychosocial Skills:     -       Oriented to: Person, Place, Time, and Situation   -       Follows Commands/attention:Follows multistep  commands  -       Communication: clear/fluent  -       Memory: No Deficits noted  -       Safety awareness/insight to disability: intact     Physical Exam:  Upper Extremity Range of Motion:     -       Right Upper Extremity: WFL  -       Left Upper Extremity: WFL  Upper Extremity Strength:    -       Right Upper Extremity: WFL  -       Left Upper Extremity: WFL   Strength:    -       Right Upper Extremity: WFL  -       Left Upper Extremity: WFL    AMPAC 6 Click ADL:  AMPAC Total Score: 24    Treatment & Education:  Patient educated on role of OT in acute setting and benefits of OOB activity. Encouraged OOB throughout the course of hospitalization to decrease risk of hospital related debility. Patient stated understanding and in agreement with POC.      Patient left sitting edge of bed with all lines intact, call button in reach, and NURSE notified    GOALS:   Multidisciplinary Problems       Occupational Therapy Goals       Not on file                    History:     Past Medical History:   Diagnosis Date    Acute hypoxemic respiratory failure 11/26/2018    Anemia     Arthritis     back, hand, knees    Back pain     Cataract     CKD stage G4/A3, GFR 15 - 29 and albumin creatinine ratio >300 mg/g     GFR 40% Jan 2014 and 33% ub 3/2014 (BRG)    Coronary artery disease involving native coronary artery of native heart 11/30/2018    Diabetic retinopathy     DM (diabetes mellitus) type II controlled, neurological manifestation     Exudative age-related macular degeneration of left eye with active choroidal neovascularization 2/5/2019    GERD (gastroesophageal reflux disease)     Glaucoma      Heart failure     Hyperlipidemia     Hypertension     Non-ST elevation MI (NSTEMI) 11/28/2018    NSTEMI (non-ST elevated myocardial infarction) 11/27/2018    Peripheral neuropathy     Polyneuropathy     Proteinuria     >6 mo     Seizures     BRG 1/2014 -dick CT NRI showed small vessel    Skin ulcer of toe of right foot with fat layer exposed 10/14/2022    Stroke 2012,2014    Tobacco dependence     Type 2 diabetes with peripheral circulatory disorder, controlled          Past Surgical History:   Procedure Laterality Date    BREAST LUMPECTOMY Left     benign, when patient was 15 years old    BREAST MASS EXCISION      ,benign, age 15.    CATHETERIZATION OF BOTH LEFT AND RIGHT HEART N/A 11/28/2018    Procedure: CATHETERIZATION, HEART, BOTH LEFT AND RIGHT;  Surgeon: Michelle Holman MD;  Location: [facility] CATH LAB;  Service: Cardiology;  Laterality: N/A;    CATHETERIZATION OF BOTH LEFT AND RIGHT HEART N/A 11/30/2018    Procedure: CATHETERIZATION, HEART, BOTH LEFT AND RIGHT;  Surgeon: Michelle Holman MD;  Location: [facility] CATH LAB;  Service: Cardiology;  Laterality: N/A;    CORONARY ARTERY BYPASS GRAFT      CORONARY ARTERY BYPASS GRAFT (CABG) N/A 7/20/2022    Procedure: CORONARY ARTERY BYPASS GRAFT (CABG);  Surgeon: Sanju Locke MD;  Location: [facility] OR;  Service: Cardiothoracic;  Laterality: N/A;  2-VESSEL PUMP ASSIST WITH EPI-AORTIC ULTRASOUND    ENDOSCOPIC HARVEST OF VEIN Left 7/20/2022    Procedure: SURGICAL PROCUREMENT, VEIN, ENDOSCOPIC;  Surgeon: Sanju Locke MD;  Location: [facility] OR;  Service: Cardiothoracic;  Laterality: Left;    HYSTERECTOMY  1982    INJECTION OF ANESTHETIC AGENT AROUND MULTIPLE INTERCOSTAL NERVES N/A 7/20/2022    Procedure: BLOCK, NERVE, INTERCOSTAL, 2 OR MORE;  Surgeon: Sanju Locke MD;  Location: [facility] OR;  Service: Cardiothoracic;  Laterality: N/A;  PARASTERNAL NERVE BLOCK    INSERTION OF SHUNT      LEFT HEART CATHETERIZATION Left 12/3/2018    Procedure: CATHETERIZATION, HEART, LEFT;   Surgeon: Michelle Holman MD;  Location: [facility] CATH LAB;  Service: Cardiology;  Laterality: Left;  LAD ATHERECTOMY STENT/ FEMORAL APPROACH    LEFT HEART CATHETERIZATION Left 7/13/2022    Procedure: CATHETERIZATION, HEART, LEFT;  Surgeon: Abhi Sloan MD;  Location: [facility] CATH LAB;  Service: Cardiology;  Laterality: Left;    OOPHORECTOMY      wrist cyst Right 1980s    dorsal wrist cyst       Time Tracking:     OT Date of Treatment: 11/14/23  OT Start Time: 1100  OT Stop Time: 1130  OT Total Time (min): 30 min    Billable Minutes:Evaluation 15 MINUTES  Therapeutic Activity 15 MINUTES    11/14/2023

## 2023-11-14 NOTE — HPI
Thank you for referring the pt to us. H/o and chart were reviewed. Pt was seen and examined. Pt is a 79 y/o female with h/o of ESRD on chronic HD q MWF at [facility], on HD since July 2020, h/o of HTN and DM, who presented for SOB. Pt missed HD today, because she had URI symptoms and was not feeling well. Pt had congestion and stuffiness x 2 days. Denies fever, no cough, no CP> Has been on Hd x 1 hour, already is beginning to feel better, less SOB.

## 2023-11-14 NOTE — ASSESSMENT & PLAN NOTE
76 y/o female with ESRD on chronic HD presented with acute coronary syndrome:     ESRD (end stage renal disease)  ESRD pt on chronic HD q MWF at [facility]  On CRRT from CABG on 7/20/22 until 7/23/22  Good response to CRRT  Switched back to conventional HD  On HD, s/p HD yesterday, tolerated HD well  Next HD in am (proior schedule), orders placed in chart     K normal  Hyponatremia  Metabolic acidosis stable   HTN: BP controlled. Meds reviewed  Anemia: normocytic. On epogen      * Acute coronary events  NSTEMI  S/p CABG on 7/20/22  Hemodynamically stable, doing well clinically      Type 2 diabetes mellitus with hyperglycemia, with long-term current use of insulin  Reviewed the chart.        Plans and recommendations:  As discussed above  Total critical care time spent 40 minutes including time needed to review the records, the   patient evaluation, documentation, discussion with ICU and other providers,   more than 50% of the time was spent on coordination of care and counseling.

## 2023-11-14 NOTE — DISCHARGE SUMMARY
[facility] Medicine  Discharge Summary      Patient Name: Avril Monzon  MRN: [id number]  [facility]: [id number]  Patient Class: IP- Inpatient  Admission Date: 11/13/2023  Hospital Length of Stay: 1 days  Discharge Date and Time:  11/14/2023 11:28 AM  Attending Physician: Facundo Dunlap MD   Discharging Provider: Facundo Dunlap MD  Primary Care Provider: Rose Germain MD    Primary Care Team: Networked reference to record PCT     HPI:   The patient is a 79 yo female with ESRD on dialysis M/W/F, CAD s/p CABG 7/2022, DM2, HLD, HTN, remote CVA, Diastolic CHF who presented to ED with SOB-onset today that gradually worsened. Pt missed HD on 11/13/23 due to generalized malaise, nasal congestion, body aches, and sneezing x 2 days. Pt was also hypoglycemic at home PTA with blood sugars 30s. Her daughter gave her COke and cookies to bring blood sugar up. On EMS arrival, hypoglycemia resolved.     IN the ED, Afebrile, O2sat 87% on room air- 2 liters oxygen NC applied, Labs revealed normal WBC, Bicarb 20, Glucose 114, BNP 3585, Trop 0.130 (last troponin 0.258 on 8/3/23). COVID/Flu negative. CXR showed cardiomegaly, pulmonary venous hypertension, left pleural effusion     Nephrology evaluated pt in the ED and pt underwent urgent HD. After HD, nephrology recommended observation due to possible need for 2nd HD tomorrow for volume overload.   While in the ED, Blood sugar 52. Pt was given juice, sandwich, and apple sauce. Blood sugar improved to 77.   Currently, pt states she feels much improved. SOB, nasal congestion, body aches etc have all improved.     * No surgery found *      Hospital Course:   11/14  Admitted for urgent hemodialysis after missing dialysis, causing acute hypoxic respiratory failure due to pulmonary edema. Tolerated urgent dialysis. After discussing with nephrology, ok to discharge home to resume regular maintenance dialysis. Patient endorses significant improvement in symptoms.     On exam, no  acute distress. No respiratory distress, wearing nasal cannula. Sleeping but easily awakens. Underweight.    Patient seen and evaluated by me. Patient was determined to be suitable for discharge. Patient deemed stable for discharge to home with homehealth resumed.       Goals of Care Treatment Preferences:  Code Status: Full Code    Health care agent: Medical decision maker is her daughter Elizabeth Monzon [phone].  Health care agent number: No value filed.    Living Will: Yes              Consults:   Consults (From admission, onward)          Status Ordering Provider     Inpatient consult to Nephrology  Once        Provider:  Angela Hanson MD    Acknowledged ISIDRO ALARCON     Inpatient consult to Nephrology  Once        Provider:  (Not yet assigned)    Acknowledged MAHOGANY FRIEDMAN            No new Assessment & Plan notes have been filed under this hospital service since the last note was generated.  Service: Hospital Medicine    Final Active Diagnoses:    Diagnosis Date Noted POA    Elevated troponin [R79.89] 12/06/2021 Yes    CAD (coronary artery disease) [I25.10] 11/30/2018 Yes    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, with long-term current use of insulin [E11.3553, Z79.4] 05/02/2016 Not Applicable    ESRD (end stage renal disease) [N18.6]  Yes      Problems Resolved During this Admission:    Diagnosis Date Noted Date Resolved POA    PRINCIPAL PROBLEM:  Acute on chronic diastolic congestive heart failure [I50.33] 11/14/2023 11/14/2023 Yes    Hypoglycemia [E16.2] 11/14/2023 11/14/2023 Yes       Discharged Condition: stable    Disposition:     Follow Up:   Follow-up Information       Rose Germain MD. Schedule an appointment as soon as possible for a visit in 3 day(s).    Specialty: Internal Medicine  Why: hospital follow up  Contact information:  [address]  [facility] [id number] [phone]               Angela Hanson MD. Schedule an  appointment as soon as possible for a visit in 1 week(s).    Specialty: Nephrology  Why: hospital follow up  Contact information:  [address] [facility]  [place] [address] [phone]                           Patient Instructions:      Activity as tolerated       Significant Diagnostic Studies: Labs: CMP   Recent Labs   Lab 11/13/23  1620 11/14/23  0514    137   K 4.5 3.9    96   CO2 20* 28   * 197*   BUN 47* 18   CREATININE 8.3* 4.5*   CALCIUM 10.3 8.8   PROT 7.7 6.0   ALBUMIN 3.8 3.0*   BILITOT 0.3 0.5   ALKPHOS 98 76   AST 22 14   ALT 20 15   ANIONGAP 20* 13   , CBC   Recent Labs   Lab 11/13/23  1620 11/14/23  0514   WBC 7.75 5.89   HGB 11.4* 10.2*   HCT 37.1 32.3*    190   , Troponin   Recent Labs   Lab 11/14/23  0041 11/14/23  0134 11/14/23  0816   TROPONINI 0.120* 0.124* 0.111*   , and All labs within the past 24 hours have been reviewed  Microbiology: flu negative   Radiology: X-Ray: CXR: portable     Pending Diagnostic Studies:       None           Medications:  Reconciled Home Medications:      Medication List        CONTINUE taking these medications      aspirin 81 MG EC tablet  Commonly known as: ECOTRIN  Take 1 tablet (81 mg total) by mouth once daily.     atropine 1% 1 % Drop  Commonly known as: ISOPTO ATROPINE  Place 1 drop into the left eye 3 (three) times daily.     azelastine 137 mcg (0.1 %) nasal spray  Commonly known as: ASTELIN  1 spray (137 mcg total) by Nasal route 2 (two) times daily.     blood sugar diagnostic Strp  Commonly known as: TRUE METRIX GLUCOSE TEST STRIP  1 strip by Misc.(Non-Drug; Combo Route) route 3 (three) times daily.     cyanocobalamin 1000 MCG tablet  Commonly known as: VITAMIN B-12  Take 100 mcg by mouth once daily.     folic acid 1 MG tablet  Commonly known as: FOLVITE  Take 1 mg by mouth once daily.     insulin aspart protamine-insulin aspart 100 unit/mL (70-30) Inpn pen  Commonly known as: NovoLOG Mix 70-30FlexPen U-100  Inject 22 units  into skin before breakfast, 10 units before dinner. On dialysis days, give 12 units before dinner.     LIDOcaine 5 %  Commonly known as: LIDODERM  Place 1 patch onto the skin once daily.     loratadine 10 mg tablet  Commonly known as: CLARITIN  Take 1 tablet (10 mg total) by mouth once daily.     metoprolol tartrate 50 MG tablet  Commonly known as: LOPRESSOR  Take 1 tablet (50 mg total) by mouth 2 (two) times daily.     pantoprazole 40 MG tablet  Commonly known as: PROTONIX  Take 1 tablet (40 mg total) by mouth once daily.     prednisoLONE acetate 1 % Drps  Commonly known as: PRED FORTE  Place 1 drop into both eyes 4 (four) times daily.     rosuvastatin 20 MG tablet  Commonly known as: CRESTOR  Take 1 tablet (20 mg total) by mouth once daily.     sevelamer carbonate 800 mg Tab  Commonly known as: RENVELA  Take 3 tablets (2,400 mg) by mouth three times a day with meals and 2 tablets (1,600 mg) by mouth with snacks     telmisartan 20 MG Tab  Commonly known as: MICARDIS  Take 20 mg by mouth once daily.     traMADoL 50 mg tablet  Commonly known as: ULTRAM  Take 1 tablet (50 mg total) by mouth every 6 (six) hours as needed for Pain.            STOP taking these medications      amLODIPine 5 MG tablet  Commonly known as: NORVASC              Indwelling Lines/Drains at time of discharge:   Lines/Drains/Airways       Drain  Duration                  Hemodialysis AV Fistula 04/01/20 2203 Left upper arm 1321 days                    Time spent on the discharge of patient: 41 minutes         Facundo Dunlap MD  Department of Hospital Medicine  '[place] - Med Surg

## 2023-11-14 NOTE — ASSESSMENT & PLAN NOTE
Patient is identified as having Diastolic (HFpEF) heart failure that is Acute on chronic. CHF is currently uncontrolled due to Pulmonary edema/pleural effusion on CXR. Latest ECHO performed and demonstrates- Results for orders placed during the hospital encounter of 07/12/22    Echo    Interpretation Summary  · Concentric hypertrophy and normal systolic function.  · The estimated ejection fraction is 55%.  · Normal left ventricular diastolic function.  · Normal right ventricular size with normal right ventricular systolic function.  · Moderate tricuspid regurgitation.  · There is pulmonary hypertension.  . Continue Beta Blocker and ACE/ARB and monitor clinical status closely. Monitor on telemetry. Patient is off CHF pathway.  Monitor strict Is&Os and daily weights.  Place on fluid restriction of 1.5 L. Cardiology has not been  consulted. Continue to stress to patient importance of self efficacy and  on diet for CHF. Last BNP reviewed- and noted below   Recent Labs   Lab 11/13/23  1620   BNP 3,585*   .    Received Urgent HD in ED for UF  Monitor for need for repeat HD in am

## 2023-11-14 NOTE — CONSULTS
[facility] - Emergency Dept.  Nephrology  Consult Note      Patient Name: Avril Monzon  MRN: [id number]  Admission Date: 11/13/2023  Hospital Length of Stay: 0 days  Attending Provider: Lizzette Holder MD   Primary Care Physician: Rose Germain MD  Principal Problem: SOB    Reason for consult: ESRD, SOB, missed HD    Consults  Subjective:     HPI: Thank you for referring the pt to us. H/o and chart were reviewed. Pt was seen and examined. Pt is a 79 y/o female with h/o of ESRD on chronic HD q MWF at [facility], on HD since July 2020, h/o of HTN and DM, who presented for SOB. Pt missed HD today, because she had URI symptoms and was not feeling well. Pt had congestion and stuffiness x 2 days. Denies fever, no cough, no CP> Has been on Hd x 1 hour, already is beginning to feel better, less SOB.    Past Medical History:   Diagnosis Date    Acute hypoxemic respiratory failure 11/26/2018    Anemia     Arthritis     back, hand, knees    Back pain     Cataract     CKD stage G4/A3, GFR 15 - 29 and albumin creatinine ratio >300 mg/g     GFR 40% Jan 2014 and 33% ub 3/2014 (BRG)    Coronary artery disease involving native coronary artery of native heart 11/30/2018    Diabetic retinopathy     DM (diabetes mellitus) type II controlled, neurological manifestation     Exudative age-related macular degeneration of left eye with active choroidal neovascularization 2/5/2019    GERD (gastroesophageal reflux disease)     Glaucoma     Heart failure     Hyperlipidemia     Hypertension     Non-ST elevation MI (NSTEMI) 11/28/2018    NSTEMI (non-ST elevated myocardial infarction) 11/27/2018    Peripheral neuropathy     Polyneuropathy     Proteinuria     >6 mo     Seizures     BRG 1/2014 -dick CT NRI showed small vessel    Skin ulcer of toe of right foot with fat layer exposed 10/14/2022    Stroke 2012,2014    Tobacco dependence     Type 2 diabetes with peripheral circulatory disorder, controlled        Past Surgical History:   Procedure  Laterality Date    BREAST LUMPECTOMY Left     benign, when patient was 15 years old    BREAST MASS EXCISION      ,benign, age 15.    CATHETERIZATION OF BOTH LEFT AND RIGHT HEART N/A 11/28/2018    Procedure: CATHETERIZATION, HEART, BOTH LEFT AND RIGHT;  Surgeon: Michelle Holman MD;  Location: [facility] CATH LAB;  Service: Cardiology;  Laterality: N/A;    CATHETERIZATION OF BOTH LEFT AND RIGHT HEART N/A 11/30/2018    Procedure: CATHETERIZATION, HEART, BOTH LEFT AND RIGHT;  Surgeon: Michelle Holman MD;  Location: [facility] CATH LAB;  Service: Cardiology;  Laterality: N/A;    CORONARY ARTERY BYPASS GRAFT      CORONARY ARTERY BYPASS GRAFT (CABG) N/A 7/20/2022    Procedure: CORONARY ARTERY BYPASS GRAFT (CABG);  Surgeon: Sanju Locke MD;  Location: [facility] OR;  Service: Cardiothoracic;  Laterality: N/A;  2-VESSEL PUMP ASSIST WITH EPI-AORTIC ULTRASOUND    ENDOSCOPIC HARVEST OF VEIN Left 7/20/2022    Procedure: SURGICAL PROCUREMENT, VEIN, ENDOSCOPIC;  Surgeon: Sanju Locke MD;  Location: [facility] OR;  Service: Cardiothoracic;  Laterality: Left;    HYSTERECTOMY  1982    INJECTION OF ANESTHETIC AGENT AROUND MULTIPLE INTERCOSTAL NERVES N/A 7/20/2022    Procedure: BLOCK, NERVE, INTERCOSTAL, 2 OR MORE;  Surgeon: Sanju Locke MD;  Location: [facility] OR;  Service: Cardiothoracic;  Laterality: N/A;  PARASTERNAL NERVE BLOCK    INSERTION OF SHUNT      LEFT HEART CATHETERIZATION Left 12/3/2018    Procedure: CATHETERIZATION, HEART, LEFT;  Surgeon: Michelle Holman MD;  Location: [facility] CATH LAB;  Service: Cardiology;  Laterality: Left;  LAD ATHERECTOMY STENT/ FEMORAL APPROACH    LEFT HEART CATHETERIZATION Left 7/13/2022    Procedure: CATHETERIZATION, HEART, LEFT;  Surgeon: Abhi Sloan MD;  Location: [facility] CATH LAB;  Service: Cardiology;  Laterality: Left;    OOPHORECTOMY      wrist cyst Right 1980s    dorsal wrist cyst       Review of patient's allergies indicates:   Allergen Reactions    Norvasc [amlodipine] Shortness Of Breath     "Codeine Swelling    Darvon [propoxyphene] Swelling    Atorvastatin      Muscle twitching     No current facility-administered medications for this encounter.     Current Outpatient Medications   Medication    aspirin (ECOTRIN) 81 MG EC tablet    atropine 1% (ISOPTO ATROPINE) 1 % Drop    cyanocobalamin (VITAMIN B-12) 1000 MCG tablet    folic acid (FOLVITE) 1 MG tablet    insulin aspart protamine-insulin aspart (NOVOLOG MIX 70-30FLEXPEN U-100) 100 unit/mL (70-30) InPn pen    LIDOcaine (LIDODERM) 5 %    loratadine (CLARITIN) 10 mg tablet    metoprolol tartrate (LOPRESSOR) 50 MG tablet    pantoprazole (PROTONIX) 40 MG tablet    prednisoLONE acetate (PRED FORTE) 1 % DrpS    rosuvastatin (CRESTOR) 20 MG tablet    sevelamer carbonate (RENVELA) 800 mg Tab    telmisartan (MICARDIS) 20 MG Tab    amLODIPine (NORVASC) 5 MG tablet    azelastine (ASTELIN) 137 mcg (0.1 %) nasal spray    blood sugar diagnostic (TRUE METRIX GLUCOSE TEST STRIP) Strp    ondansetron (ZOFRAN-ODT) 4 MG TbDL    pen needle, diabetic 32 gauge x 5/32" Ndle    traMADoL (ULTRAM) 50 mg tablet     Family History       Problem Relation (Age of Onset)    Cancer Maternal Grandmother    Diabetes Mother    Heart disease Mother    Hyperlipidemia Mother    Hypertension Mother    Muscular dystrophy Son          Tobacco Use    Smoking status: Former     Current packs/day: 0.00     Average packs/day: 1.5 packs/day for 30.0 years (45.0 ttl pk-yrs)     Types: Cigarettes     Start date: 1960     Quit date: 1990     Years since quittin.8    Smokeless tobacco: Former   Substance and Sexual Activity    Alcohol use: No     Alcohol/week: 0.0 standard drinks of alcohol    Drug use: No    Sexual activity: Not Currently     Review of Systems   Constitutional: Negative.    HENT:  Positive for congestion and sinus pressure.    Respiratory:  Positive for shortness of breath.    Cardiovascular: Negative.    Gastrointestinal: Negative.    Genitourinary: Negative.  "   Musculoskeletal: Negative.    Neurological: Negative.    Psychiatric/Behavioral: Negative.       Objective:     Vital Signs (Most Recent):  Temp: 97.7 °F (36.5 °C) (11/13/23 1447)  Pulse: 66 (11/13/23 1700)  Resp: 20 (11/13/23 1700)  BP: (!) 148/107 (11/13/23 1700)  SpO2: 100 % (11/13/23 1700) Vital Signs (24h Range):  Temp:  [97.7 °F (36.5 °C)] 97.7 °F (36.5 °C)  Pulse:  [65-66] 66  Resp:  [16-22] 20  SpO2:  [87 %-100 %] 100 %  BP: (148-171)/() 148/107     Weight: 70.1 kg (154 lb 8.7 oz) (11/13/23 1634)  Body mass index is 25.72 kg/m².  Body surface area is 1.79 meters squared.    No intake/output data recorded.     Physical Exam  Vitals and nursing note reviewed.   Constitutional:       Appearance: Normal appearance.   Cardiovascular:      Rate and Rhythm: Normal rate and regular rhythm.      Pulses: Normal pulses.      Heart sounds: Normal heart sounds.   Pulmonary:      Effort: Pulmonary effort is normal.      Breath sounds: Rales present.   Abdominal:      Palpations: Abdomen is soft.      Tenderness: There is no abdominal tenderness.   Musculoskeletal:      Right lower leg: Edema present.      Left lower leg: No edema.   Neurological:      Mental Status: She is alert and oriented to person, place, and time.   Psychiatric:         Behavior: Behavior normal.          Significant Labs: reviewed  BMP  Lab Results   Component Value Date     11/13/2023    K 4.5 11/13/2023     11/13/2023    CO2 20 (L) 11/13/2023    BUN 47 (H) 11/13/2023    CREATININE 8.3 (H) 11/13/2023    CALCIUM 10.3 11/13/2023    ANIONGAP 20 (H) 11/13/2023    EGFRNORACEVR 5 (A) 11/13/2023     Lab Results   Component Value Date    WBC 7.75 11/13/2023    HGB 11.4 (L) 11/13/2023    HCT 37.1 11/13/2023     (H) 11/13/2023     11/13/2023     COVID negative    Significant Imaging: CXR reviewed, has pulmonary edema      Assessment/Plan:     77 y/o female with ESRD on chronic HD presented with SOB and fluid overload:      ESRD (end stage renal disease)  ESRD pt on chronic HD q MWF at [facility]  On HD since 2020. H/o of DM and HTN     Presented with dyspnea secondary to fluid overload, pulmonary edema is quite significant on CXR  K normal  Na normal  Metabolic acidosis   HTN: BP not ideally controlled, due to intravascular fluid congestion  Anemia, mild    Meds reviewed  Receiving HD currently, tolerating HD well, continue HD and fluid removal  Pt was advised of ways to reduce thirst and control fludi gain, including avoiding sweet and salty foods  Fluid restriction < 1 L/day advised  Advised pt that both fluid gain and rapid HD fluid removal can injure the heart.  May require HD again in am    Recommend admit for observation      URI symptoms  COVID test is negative  Sinusitis, viral syndrome?      Type 2 diabetes mellitus with hyperglycemia, with long-term current use of insulin  Reviewed the chart.        Plans and recommendations:  As discussed above  Total time spent 70 minutes including time needed to review the records, the   patient evaluation, documentation, face-to-face discussion with the patient,   more than 50% of the time was spent on coordination of care and counseling.    Level V visit.  Multiple medical issues were addressed, as documented. Medical care provided was in addition to providing dialysis.          Thank you for your consult.       Angela Hanson MD   Nephrology  [facility] - Emergency Dept.

## 2023-11-14 NOTE — HOSPITAL COURSE
11/14  Admitted for urgent hemodialysis after missing dialysis, causing acute hypoxic respiratory failure due to pulmonary edema. Tolerated urgent dialysis. After discussing with nephrology, ok to discharge home to resume regular maintenance dialysis. Patient endorses significant improvement in symptoms.     On exam, no acute distress. No respiratory distress, wearing nasal cannula. Sleeping but easily awakens. Underweight.    Patient seen and evaluated by me. Patient was determined to be suitable for discharge. Patient deemed stable for discharge to home with homehealth resumed.

## 2023-11-14 NOTE — SUBJECTIVE & OBJECTIVE
Past Medical History:   Diagnosis Date    Acute hypoxemic respiratory failure 11/26/2018    Anemia     Arthritis     back, hand, knees    Back pain     Cataract     CKD stage G4/A3, GFR 15 - 29 and albumin creatinine ratio >300 mg/g     GFR 40% Jan 2014 and 33% ub 3/2014 (BRG)    Coronary artery disease involving native coronary artery of native heart 11/30/2018    Diabetic retinopathy     DM (diabetes mellitus) type II controlled, neurological manifestation     Exudative age-related macular degeneration of left eye with active choroidal neovascularization 2/5/2019    GERD (gastroesophageal reflux disease)     Glaucoma     Heart failure     Hyperlipidemia     Hypertension     Non-ST elevation MI (NSTEMI) 11/28/2018    NSTEMI (non-ST elevated myocardial infarction) 11/27/2018    Peripheral neuropathy     Polyneuropathy     Proteinuria     >6 mo     Seizures     BRG 1/2014 -dick CT NRI showed small vessel    Skin ulcer of toe of right foot with fat layer exposed 10/14/2022    Stroke 2012,2014    Tobacco dependence     Type 2 diabetes with peripheral circulatory disorder, controlled        Past Surgical History:   Procedure Laterality Date    BREAST LUMPECTOMY Left     benign, when patient was 15 years old    BREAST MASS EXCISION      ,benign, age 15.    CATHETERIZATION OF BOTH LEFT AND RIGHT HEART N/A 11/28/2018    Procedure: CATHETERIZATION, HEART, BOTH LEFT AND RIGHT;  Surgeon: Michelle Holman MD;  Location: [facility] CATH LAB;  Service: Cardiology;  Laterality: N/A;    CATHETERIZATION OF BOTH LEFT AND RIGHT HEART N/A 11/30/2018    Procedure: CATHETERIZATION, HEART, BOTH LEFT AND RIGHT;  Surgeon: Michelle Holman MD;  Location: [facility] CATH LAB;  Service: Cardiology;  Laterality: N/A;    CORONARY ARTERY BYPASS GRAFT      CORONARY ARTERY BYPASS GRAFT (CABG) N/A 7/20/2022    Procedure: CORONARY ARTERY BYPASS GRAFT (CABG);  Surgeon: Sanju Locke MD;  Location: [facility] OR;  Service: Cardiothoracic;  Laterality: N/A;  2-VESSEL  PUMP ASSIST WITH EPI-AORTIC ULTRASOUND    ENDOSCOPIC HARVEST OF VEIN Left 7/20/2022    Procedure: SURGICAL PROCUREMENT, VEIN, ENDOSCOPIC;  Surgeon: Sanju Locke MD;  Location: [facility] OR;  Service: Cardiothoracic;  Laterality: Left;    HYSTERECTOMY  1982    INJECTION OF ANESTHETIC AGENT AROUND MULTIPLE INTERCOSTAL NERVES N/A 7/20/2022    Procedure: BLOCK, NERVE, INTERCOSTAL, 2 OR MORE;  Surgeon: Sanju Locke MD;  Location: [facility] OR;  Service: Cardiothoracic;  Laterality: N/A;  PARASTERNAL NERVE BLOCK    INSERTION OF SHUNT      LEFT HEART CATHETERIZATION Left 12/3/2018    Procedure: CATHETERIZATION, HEART, LEFT;  Surgeon: Michelle Holman MD;  Location: [facility] CATH LAB;  Service: Cardiology;  Laterality: Left;  LAD ATHERECTOMY STENT/ FEMORAL APPROACH    LEFT HEART CATHETERIZATION Left 7/13/2022    Procedure: CATHETERIZATION, HEART, LEFT;  Surgeon: Abhi Sloan MD;  Location: [facility] CATH LAB;  Service: Cardiology;  Laterality: Left;    OOPHORECTOMY      wrist cyst Right 1980s    dorsal wrist cyst       Review of patient's allergies indicates:   Allergen Reactions    Norvasc [amlodipine] Shortness Of Breath    Codeine Swelling    Darvon [propoxyphene] Swelling    Atorvastatin      Muscle twitching       No current facility-administered medications on file prior to encounter.     Current Outpatient Medications on File Prior to Encounter   Medication Sig    aspirin (ECOTRIN) 81 MG EC tablet Take 1 tablet (81 mg total) by mouth once daily.    atropine 1% (ISOPTO ATROPINE) 1 % Drop Place 1 drop into the left eye 3 (three) times daily.    cyanocobalamin (VITAMIN B-12) 1000 MCG tablet Take 100 mcg by mouth once daily.    folic acid (FOLVITE) 1 MG tablet Take 1 mg by mouth once daily.    insulin aspart protamine-insulin aspart (NOVOLOG MIX 70-30FLEXPEN U-100) 100 unit/mL (70-30) InPn pen Inject 22 units into skin before breakfast, 10 units before dinner. On dialysis days, give 12 units before dinner.    LIDOcaine  (LIDODERM) 5 % Place 1 patch onto the skin once daily.    loratadine (CLARITIN) 10 mg tablet Take 1 tablet (10 mg total) by mouth once daily.    metoprolol tartrate (LOPRESSOR) 50 MG tablet Take 1 tablet (50 mg total) by mouth 2 (two) times daily.    pantoprazole (PROTONIX) 40 MG tablet Take 1 tablet (40 mg total) by mouth once daily.    prednisoLONE acetate (PRED FORTE) 1 % DrpS Place 1 drop into both eyes 4 (four) times daily.    rosuvastatin (CRESTOR) 20 MG tablet Take 1 tablet (20 mg total) by mouth once daily.    sevelamer carbonate (RENVELA) 800 mg Tab Take 3 tablets (2,400 mg) by mouth three times a day with meals and 2 tablets (1,600 mg) by mouth with snacks    telmisartan (MICARDIS) 20 MG Tab Take 20 mg by mouth once daily.    amLODIPine (NORVASC) 5 MG tablet Take 5 mg by mouth once daily.    azelastine (ASTELIN) 137 mcg (0.1 %) nasal spray 1 spray (137 mcg total) by Nasal route 2 (two) times daily.    blood sugar diagnostic (TRUE METRIX GLUCOSE TEST STRIP) Strp 1 strip by Misc.(Non-Drug; Combo Route) route 3 (three) times daily.    traMADoL (ULTRAM) 50 mg tablet Take 1 tablet (50 mg total) by mouth every 6 (six) hours as needed for Pain.    [DISCONTINUED] isosorbide-hydrALAZINE 20-37.5 mg (BIDIL) 20-37.5 mg Tab Take 1 tablet by mouth 3 (three) times daily.     Family History       Problem Relation (Age of Onset)    Cancer Maternal Grandmother    Diabetes Mother    Heart disease Mother    Hyperlipidemia Mother    Hypertension Mother    Muscular dystrophy Son          Tobacco Use    Smoking status: Former     Current packs/day: 0.00     Average packs/day: 1.5 packs/day for 30.0 years (45.0 ttl pk-yrs)     Types: Cigarettes     Start date: 1960     Quit date: 1990     Years since quittin.8    Smokeless tobacco: Former   Substance and Sexual Activity    Alcohol use: No     Alcohol/week: 0.0 standard drinks of alcohol    Drug use: No    Sexual activity: Not Currently     Review of Systems    Constitutional:  Negative for appetite change, chills, diaphoresis, fatigue, fever and unexpected weight change.   HENT:  Positive for congestion and sneezing. Negative for nosebleeds, sinus pressure and sore throat.    Eyes:  Negative for pain, discharge and visual disturbance.   Respiratory:  Positive for shortness of breath. Negative for cough, chest tightness, wheezing and stridor.    Cardiovascular:  Negative for chest pain, palpitations and leg swelling.   Gastrointestinal:  Negative for abdominal distention, abdominal pain, blood in stool, constipation, diarrhea, nausea and vomiting.   Endocrine: Negative for cold intolerance and heat intolerance.   Genitourinary:  Negative for difficulty urinating, dysuria, flank pain, frequency and urgency.   Musculoskeletal:  Positive for myalgias. Negative for arthralgias, back pain, joint swelling, neck pain and neck stiffness.   Skin:  Negative for rash and wound.   Allergic/Immunologic: Negative for food allergies and immunocompromised state.   Neurological:  Negative for dizziness, seizures, syncope, facial asymmetry, speech difficulty, weakness, light-headedness, numbness and headaches.   Hematological:  Negative for adenopathy.   Psychiatric/Behavioral:  Negative for agitation, confusion and hallucinations.      Objective:     Vital Signs (Most Recent):  Temp: 98.2 °F (36.8 °C) (11/14/23 0026)  Pulse: 72 (11/14/23 0045)  Resp: 20 (11/14/23 0026)  BP: (!) 155/67 (11/14/23 0045)  SpO2: 99 % (11/14/23 0026) Vital Signs (24h Range):  Temp:  [97.7 °F (36.5 °C)-98.2 °F (36.8 °C)] 98.2 °F (36.8 °C)  Pulse:  [65-72] 72  Resp:  [16-22] 20  SpO2:  [87 %-100 %] 99 %  BP: (148-192)/() 155/67     Weight: 70.1 kg (154 lb 8.7 oz)  Body mass index is 25.72 kg/m².     Physical Exam  Vitals and nursing note reviewed.   Constitutional:       General: She is not in acute distress.     Appearance: She is well-developed. She is not diaphoretic.   HENT:      Head: Normocephalic  and atraumatic.      Nose: Nose normal.   Eyes:      General: No scleral icterus.     Conjunctiva/sclera: Conjunctivae normal.   Neck:      Trachea: No tracheal deviation.   Cardiovascular:      Rate and Rhythm: Normal rate and regular rhythm.      Heart sounds: Murmur heard.      No friction rub. No gallop.   Pulmonary:      Effort: Pulmonary effort is normal. No respiratory distress.      Breath sounds: Normal breath sounds. No stridor. No wheezing or rales.      Comments: Diminished to bases   Chest:      Chest wall: No tenderness.   Abdominal:      General: Bowel sounds are normal. There is no distension.      Palpations: Abdomen is soft. There is no mass.      Tenderness: There is no abdominal tenderness. There is no guarding or rebound.   Musculoskeletal:         General: No tenderness or deformity. Normal range of motion.      Cervical back: Normal range of motion and neck supple.   Skin:     General: Skin is warm and dry.      Coloration: Skin is not pale.      Findings: No erythema or rash.   Neurological:      Mental Status: She is alert and oriented to person, place, and time.      Cranial Nerves: No cranial nerve deficit.      Motor: No abnormal muscle tone.      Coordination: Coordination normal.   Psychiatric:         Behavior: Behavior normal.         Thought Content: Thought content normal.                Significant Labs: All pertinent labs within the past 24 hours have been reviewed.    Significant Imaging: I have reviewed all pertinent imaging results/findings within the past 24 hours.

## 2023-11-14 NOTE — ASSESSMENT & PLAN NOTE
Creatine stable for now. BMP reviewed- noted Estimated Creatinine Clearance: 5.7 mL/min (A) (based on SCr of 8.3 mg/dL (H)). according to latest data. Based on current GFR, CKD stage is end stage.  Monitor UOP and serial BMP and adjust therapy as needed. Renally dose meds. Avoid nephrotoxic medications and procedures.    Consult nephrology

## 2023-11-14 NOTE — PT/OT/SLP EVAL
Physical Therapy Evaluation    Patient Name:  Avril Monzon   MRN:  [id number]    Recommendations:     Discharge Recommendations: Low Intensity Therapy   Discharge Equipment Recommendations: none   Barriers to discharge: None    Assessment:     Avril Monzon is a 78 y.o. female admitted with a medical diagnosis of Acute on chronic diastolic congestive heart failure.  She presents with the following impairments/functional limitations: weakness, impaired endurance, impaired self care skills, impaired functional mobility, gait instability, impaired balance, decreased lower extremity function .    Rehab Prognosis: Good; patient would benefit from acute skilled PT services to address these deficits and reach maximum level of function.    Recent Surgery: * No surgery found *      Plan:     During this hospitalization, patient to be seen 3 x/week to address the identified rehab impairments via gait training, therapeutic activities, therapeutic exercises and progress toward the following goals:    Plan of Care Expires:  11/28/23    Subjective     Chief Complaint: NONE   Patient/Family Comments/goals: NONE STATED   Pain/Comfort:  Pain Rating 1: 0/10  Pain Rating Post-Intervention 1: 0/10    Patients cultural, spiritual, Advent conflicts given the current situation:      Living Environment:   PT LIVES AT HOME WITH DAUGHTER IN A ONE STORY HOME WITH NO STEPS   Prior to admission, patients level of function was IND AND DOESN'T DRIVE .  Equipment used at home: none.  DME owned (not currently used): wheelchair.  Upon discharge, patient will have assistance from DAUGHTER.    Objective:     Communicated with NURSE POWELL AND [facility] CHART REVIEW  prior to session.  Patient found supine with peripheral IV, oxygen  upon PT entry to room.    General Precautions: Standard, fall  Orthopedic Precautions:N/A   Braces: N/A  Respiratory Status: Nasal cannula, flow 2 L/min    Exams:  RLE ROM: WFL  RLE Strength: WFL  LLE ROM: WFL  LLE  "Strength: WFL    Functional Mobility:  Bed Mobility:     Scooting: independence  Supine to Sit: independence  Transfers:     Sit to Stand:  contact guard assistance with rolling walker  Bed to Chair: contact guard assistance with  no AD  using  Stand Pivot  Gait: PT GT TRAINED X 200' WITH CGA ON LEVEL INDOOR SURFACES.       AM-PAC 6 CLICK MOBILITY  Total Score:18       Treatment & Education:  PT RETURNED TO RM T/F TO CHAIR WITH CGA. PT EDUCATED ON B LE TE X 10 REPS OF AP, TKE AND MIP. Pt educated on the following: Pt verbally agreed in understanding.   Role of P.T. in the acute care setting.  Sit in chair throughout the day to increase functional strength and activity tolerance and decrease risk of blood clots and secondary infection.   "Call, don't fall" procedure of pressing red button on call bell for all transfers.      Patient left up in chair with all lines intact and call button in reach.    GOALS:   Multidisciplinary Problems       Physical Therapy Goals          Problem: Physical Therapy    Goal Priority Disciplines Outcome Goal Variances Interventions   Physical Therapy Goal     PT, PT/OT      Description: LT23  1. PT WILL COMPLETE BED MOBILITY IND.   2. PT WILL T/F TO CHAIR WITH LRAD IND  3. PT WILL GT TRAIN X 500' WITH LRAD IND TO PROGRESS GT  4. PT WILL INC AMPAC SCORE BY 2 POINTS TO PROGRESS GROSS FUNC MOBILITY.                          History:     Past Medical History:   Diagnosis Date    Acute hypoxemic respiratory failure 2018    Anemia     Arthritis     back, hand, knees    Back pain     Cataract     CKD stage G4/A3, GFR 15 - 29 and albumin creatinine ratio >300 mg/g     GFR 40% 2014 and 33% ub 3/2014 (BRG)    Coronary artery disease involving native coronary artery of native heart 2018    Diabetic retinopathy     DM (diabetes mellitus) type II controlled, neurological manifestation     Exudative age-related macular degeneration of left eye with active choroidal " neovascularization 2/5/2019    GERD (gastroesophageal reflux disease)     Glaucoma     Heart failure     Hyperlipidemia     Hypertension     Non-ST elevation MI (NSTEMI) 11/28/2018    NSTEMI (non-ST elevated myocardial infarction) 11/27/2018    Peripheral neuropathy     Polyneuropathy     Proteinuria     >6 mo     Seizures     BRG 1/2014 -dick CT NRI showed small vessel    Skin ulcer of toe of right foot with fat layer exposed 10/14/2022    Stroke 2012,2014    Tobacco dependence     Type 2 diabetes with peripheral circulatory disorder, controlled        Past Surgical History:   Procedure Laterality Date    BREAST LUMPECTOMY Left     benign, when patient was 15 years old    BREAST MASS EXCISION      ,benign, age 15.    CATHETERIZATION OF BOTH LEFT AND RIGHT HEART N/A 11/28/2018    Procedure: CATHETERIZATION, HEART, BOTH LEFT AND RIGHT;  Surgeon: Michelle Holman MD;  Location: [facility] CATH LAB;  Service: Cardiology;  Laterality: N/A;    CATHETERIZATION OF BOTH LEFT AND RIGHT HEART N/A 11/30/2018    Procedure: CATHETERIZATION, HEART, BOTH LEFT AND RIGHT;  Surgeon: Michelle Holman MD;  Location: [facility] CATH LAB;  Service: Cardiology;  Laterality: N/A;    CORONARY ARTERY BYPASS GRAFT      CORONARY ARTERY BYPASS GRAFT (CABG) N/A 7/20/2022    Procedure: CORONARY ARTERY BYPASS GRAFT (CABG);  Surgeon: Sanju Locke MD;  Location: [facility] OR;  Service: Cardiothoracic;  Laterality: N/A;  2-VESSEL PUMP ASSIST WITH EPI-AORTIC ULTRASOUND    ENDOSCOPIC HARVEST OF VEIN Left 7/20/2022    Procedure: SURGICAL PROCUREMENT, VEIN, ENDOSCOPIC;  Surgeon: Sanju Locke MD;  Location: [facility] OR;  Service: Cardiothoracic;  Laterality: Left;    HYSTERECTOMY  1982    INJECTION OF ANESTHETIC AGENT AROUND MULTIPLE INTERCOSTAL NERVES N/A 7/20/2022    Procedure: BLOCK, NERVE, INTERCOSTAL, 2 OR MORE;  Surgeon: Sanju Locke MD;  Location: [facility] OR;  Service: Cardiothoracic;  Laterality: N/A;  PARASTERNAL NERVE BLOCK    INSERTION OF SHUNT      LEFT  HEART CATHETERIZATION Left 12/3/2018    Procedure: CATHETERIZATION, HEART, LEFT;  Surgeon: Michelle Holman MD;  Location: [facility] CATH LAB;  Service: Cardiology;  Laterality: Left;  LAD ATHERECTOMY STENT/ FEMORAL APPROACH    LEFT HEART CATHETERIZATION Left 7/13/2022    Procedure: CATHETERIZATION, HEART, LEFT;  Surgeon: Abhi Sloan MD;  Location: [facility] CATH LAB;  Service: Cardiology;  Laterality: Left;    OOPHORECTOMY      wrist cyst Right 1980s    dorsal wrist cyst       Time Tracking:     PT Received On: 11/14/23  PT Start Time: 0730     PT Stop Time: 0755  PT Total Time (min): 25 min     Billable Minutes: Evaluation 15 and Therapeutic Activity 10      11/14/2023

## 2023-11-14 NOTE — HPI
The patient is a 79 yo female with ESRD on dialysis M/W/F, CAD s/p CABG 7/2022, DM2, HLD, HTN, remote CVA, Diastolic CHF who presented to ED with SOB-onset today that gradually worsened. Pt missed HD on 11/13/23 due to generalized malaise, nasal congestion, body aches, and sneezing x 2 days. Pt was also hypoglycemic at home PTA with blood sugars 30s. Her daughter gave her COke and cookies to bring blood sugar up. On EMS arrival, hypoglycemia resolved.     IN the ED, Afebrile, O2sat 87% on room air- 2 liters oxygen NC applied, Labs revealed normal WBC, Bicarb 20, Glucose 114, BNP 3585, Trop 0.130 (last troponin 0.258 on 8/3/23). COVID/Flu negative. CXR showed cardiomegaly, pulmonary venous hypertension, left pleural effusion     Nephrology evaluated pt in the ED and pt underwent urgent HD. After HD, nephrology recommended observation due to possible need for 2nd HD tomorrow for volume overload.   While in the ED, Blood sugar 52. Pt was given juice, sandwich, and apple sauce. Blood sugar improved to 77.   Currently, pt states she feels much improved. SOB, nasal congestion, body aches etc have all improved.

## 2023-11-14 NOTE — H&P
[facility] Medicine  History & Physical    Patient Name: Avril Monzon  MRN: [id number]  Patient Class: IP- Inpatient  Admission Date: 11/13/2023  Attending Physician: Skip Pisano MD   Primary Care Provider: Rose Germain MD         Patient information was obtained from patient and ER records.     Subjective:     Principal Problem:Acute on chronic diastolic congestive heart failure    Chief Complaint:   Chief Complaint   Patient presents with    General Illness     Per ems, pt originally called ems for hypoglycemia, once ems arrived on scene pt was no longer hypoglycemic and was now complaining of SOB. Pt then told ems that she is always short of breath when she lays down and today's Sob is not new. Pt then requested to still come to the hospital because she missed her dialysis appointment today because she wanted to go to her doctors appointment.         HPI: The patient is a 79 yo female with ESRD on dialysis M/W/F, CAD s/p CABG 7/2022, DM2, HLD, HTN, remote CVA, Diastolic CHF who presented to ED with SOB-onset today that gradually worsened. Pt missed HD on 11/13/23 due to generalized malaise, nasal congestion, body aches, and sneezing x 2 days. Pt was also hypoglycemic at home PTA with blood sugars 30s. Her daughter gave her COke and cookies to bring blood sugar up. On EMS arrival, hypoglycemia resolved.     IN the ED, Afebrile, O2sat 87% on room air- 2 liters oxygen NC applied, Labs revealed normal WBC, Bicarb 20, Glucose 114, BNP 3585, Trop 0.130 (last troponin 0.258 on 8/3/23). COVID/Flu negative. CXR showed cardiomegaly, pulmonary venous hypertension, left pleural effusion     Nephrology evaluated pt in the ED and pt underwent urgent HD. After HD, nephrology recommended observation due to possible need for 2nd HD tomorrow for volume overload.   While in the ED, Blood sugar 52. Pt was given juice, sandwich, and apple sauce. Blood sugar improved to 77.   Currently, pt states she  feels much improved. SOB, nasal congestion, body aches etc have all improved.     Past Medical History:   Diagnosis Date    Acute hypoxemic respiratory failure 11/26/2018    Anemia     Arthritis     back, hand, knees    Back pain     Cataract     CKD stage G4/A3, GFR 15 - 29 and albumin creatinine ratio >300 mg/g     GFR 40% Jan 2014 and 33% ub 3/2014 (BRG)    Coronary artery disease involving native coronary artery of native heart 11/30/2018    Diabetic retinopathy     DM (diabetes mellitus) type II controlled, neurological manifestation     Exudative age-related macular degeneration of left eye with active choroidal neovascularization 2/5/2019    GERD (gastroesophageal reflux disease)     Glaucoma     Heart failure     Hyperlipidemia     Hypertension     Non-ST elevation MI (NSTEMI) 11/28/2018    NSTEMI (non-ST elevated myocardial infarction) 11/27/2018    Peripheral neuropathy     Polyneuropathy     Proteinuria     >6 mo     Seizures     BRG 1/2014 -dick CT NRI showed small vessel    Skin ulcer of toe of right foot with fat layer exposed 10/14/2022    Stroke 2012,2014    Tobacco dependence     Type 2 diabetes with peripheral circulatory disorder, controlled        Past Surgical History:   Procedure Laterality Date    BREAST LUMPECTOMY Left     benign, when patient was 15 years old    BREAST MASS EXCISION      ,benign, age 15.    CATHETERIZATION OF BOTH LEFT AND RIGHT HEART N/A 11/28/2018    Procedure: CATHETERIZATION, HEART, BOTH LEFT AND RIGHT;  Surgeon: Michelle Holman MD;  Location: [facility] CATH LAB;  Service: Cardiology;  Laterality: N/A;    CATHETERIZATION OF BOTH LEFT AND RIGHT HEART N/A 11/30/2018    Procedure: CATHETERIZATION, HEART, BOTH LEFT AND RIGHT;  Surgeon: Michelle Holman MD;  Location: [facility] CATH LAB;  Service: Cardiology;  Laterality: N/A;    CORONARY ARTERY BYPASS GRAFT      CORONARY ARTERY BYPASS GRAFT (CABG) N/A 7/20/2022    Procedure: CORONARY ARTERY BYPASS GRAFT (CABG);  Surgeon: Sanju REMY  MD Cornelia;  Location: [facility] OR;  Service: Cardiothoracic;  Laterality: N/A;  2-VESSEL PUMP ASSIST WITH EPI-AORTIC ULTRASOUND    ENDOSCOPIC HARVEST OF VEIN Left 7/20/2022    Procedure: SURGICAL PROCUREMENT, VEIN, ENDOSCOPIC;  Surgeon: Sanju Locke MD;  Location: [facility] OR;  Service: Cardiothoracic;  Laterality: Left;    HYSTERECTOMY  1982    INJECTION OF ANESTHETIC AGENT AROUND MULTIPLE INTERCOSTAL NERVES N/A 7/20/2022    Procedure: BLOCK, NERVE, INTERCOSTAL, 2 OR MORE;  Surgeon: Sanju Locke MD;  Location: [facility] OR;  Service: Cardiothoracic;  Laterality: N/A;  PARASTERNAL NERVE BLOCK    INSERTION OF SHUNT      LEFT HEART CATHETERIZATION Left 12/3/2018    Procedure: CATHETERIZATION, HEART, LEFT;  Surgeon: Michelle Holman MD;  Location: [facility] CATH LAB;  Service: Cardiology;  Laterality: Left;  LAD ATHERECTOMY STENT/ FEMORAL APPROACH    LEFT HEART CATHETERIZATION Left 7/13/2022    Procedure: CATHETERIZATION, HEART, LEFT;  Surgeon: Abhi Sloan MD;  Location: [facility] CATH LAB;  Service: Cardiology;  Laterality: Left;    OOPHORECTOMY      wrist cyst Right 1980s    dorsal wrist cyst       Review of patient's allergies indicates:   Allergen Reactions    Norvasc [amlodipine] Shortness Of Breath    Codeine Swelling    Darvon [propoxyphene] Swelling    Atorvastatin      Muscle twitching       No current facility-administered medications on file prior to encounter.     Current Outpatient Medications on File Prior to Encounter   Medication Sig    aspirin (ECOTRIN) 81 MG EC tablet Take 1 tablet (81 mg total) by mouth once daily.    atropine 1% (ISOPTO ATROPINE) 1 % Drop Place 1 drop into the left eye 3 (three) times daily.    cyanocobalamin (VITAMIN B-12) 1000 MCG tablet Take 100 mcg by mouth once daily.    folic acid (FOLVITE) 1 MG tablet Take 1 mg by mouth once daily.    insulin aspart protamine-insulin aspart (NOVOLOG MIX 70-30FLEXPEN U-100) 100 unit/mL (70-30) InPn pen Inject 22 units into skin before breakfast,  10 units before dinner. On dialysis days, give 12 units before dinner.    LIDOcaine (LIDODERM) 5 % Place 1 patch onto the skin once daily.    loratadine (CLARITIN) 10 mg tablet Take 1 tablet (10 mg total) by mouth once daily.    metoprolol tartrate (LOPRESSOR) 50 MG tablet Take 1 tablet (50 mg total) by mouth 2 (two) times daily.    pantoprazole (PROTONIX) 40 MG tablet Take 1 tablet (40 mg total) by mouth once daily.    prednisoLONE acetate (PRED FORTE) 1 % DrpS Place 1 drop into both eyes 4 (four) times daily.    rosuvastatin (CRESTOR) 20 MG tablet Take 1 tablet (20 mg total) by mouth once daily.    sevelamer carbonate (RENVELA) 800 mg Tab Take 3 tablets (2,400 mg) by mouth three times a day with meals and 2 tablets (1,600 mg) by mouth with snacks    telmisartan (MICARDIS) 20 MG Tab Take 20 mg by mouth once daily.    amLODIPine (NORVASC) 5 MG tablet Take 5 mg by mouth once daily.    azelastine (ASTELIN) 137 mcg (0.1 %) nasal spray 1 spray (137 mcg total) by Nasal route 2 (two) times daily.    blood sugar diagnostic (TRUE METRIX GLUCOSE TEST STRIP) Strp 1 strip by Misc.(Non-Drug; Combo Route) route 3 (three) times daily.    traMADoL (ULTRAM) 50 mg tablet Take 1 tablet (50 mg total) by mouth every 6 (six) hours as needed for Pain.    [DISCONTINUED] isosorbide-hydrALAZINE 20-37.5 mg (BIDIL) 20-37.5 mg Tab Take 1 tablet by mouth 3 (three) times daily.     Family History       Problem Relation (Age of Onset)    Cancer Maternal Grandmother    Diabetes Mother    Heart disease Mother    Hyperlipidemia Mother    Hypertension Mother    Muscular dystrophy Son          Tobacco Use    Smoking status: Former     Current packs/day: 0.00     Average packs/day: 1.5 packs/day for 30.0 years (45.0 ttl pk-yrs)     Types: Cigarettes     Start date: 1960     Quit date: 1990     Years since quittin.8    Smokeless tobacco: Former   Substance and Sexual Activity    Alcohol use: No     Alcohol/week: 0.0 standard drinks of  alcohol    Drug use: No    Sexual activity: Not Currently     Review of Systems   Constitutional:  Negative for appetite change, chills, diaphoresis, fatigue, fever and unexpected weight change.   HENT:  Positive for congestion and sneezing. Negative for nosebleeds, sinus pressure and sore throat.    Eyes:  Negative for pain, discharge and visual disturbance.   Respiratory:  Positive for shortness of breath. Negative for cough, chest tightness, wheezing and stridor.    Cardiovascular:  Negative for chest pain, palpitations and leg swelling.   Gastrointestinal:  Negative for abdominal distention, abdominal pain, blood in stool, constipation, diarrhea, nausea and vomiting.   Endocrine: Negative for cold intolerance and heat intolerance.   Genitourinary:  Negative for difficulty urinating, dysuria, flank pain, frequency and urgency.   Musculoskeletal:  Positive for myalgias. Negative for arthralgias, back pain, joint swelling, neck pain and neck stiffness.   Skin:  Negative for rash and wound.   Allergic/Immunologic: Negative for food allergies and immunocompromised state.   Neurological:  Negative for dizziness, seizures, syncope, facial asymmetry, speech difficulty, weakness, light-headedness, numbness and headaches.   Hematological:  Negative for adenopathy.   Psychiatric/Behavioral:  Negative for agitation, confusion and hallucinations.      Objective:     Vital Signs (Most Recent):  Temp: 98.2 °F (36.8 °C) (11/14/23 0026)  Pulse: 72 (11/14/23 0045)  Resp: 20 (11/14/23 0026)  BP: (!) 155/67 (11/14/23 0045)  SpO2: 99 % (11/14/23 0026) Vital Signs (24h Range):  Temp:  [97.7 °F (36.5 °C)-98.2 °F (36.8 °C)] 98.2 °F (36.8 °C)  Pulse:  [65-72] 72  Resp:  [16-22] 20  SpO2:  [87 %-100 %] 99 %  BP: (148-192)/() 155/67     Weight: 70.1 kg (154 lb 8.7 oz)  Body mass index is 25.72 kg/m².     Physical Exam  Vitals and nursing note reviewed.   Constitutional:       General: She is not in acute distress.     Appearance:  She is well-developed. She is not diaphoretic.   HENT:      Head: Normocephalic and atraumatic.      Nose: Nose normal.   Eyes:      General: No scleral icterus.     Conjunctiva/sclera: Conjunctivae normal.   Neck:      Trachea: No tracheal deviation.   Cardiovascular:      Rate and Rhythm: Normal rate and regular rhythm.      Heart sounds: Murmur heard.      No friction rub. No gallop.   Pulmonary:      Effort: Pulmonary effort is normal. No respiratory distress.      Breath sounds: Normal breath sounds. No stridor. No wheezing or rales.      Comments: Diminished to bases   Chest:      Chest wall: No tenderness.   Abdominal:      General: Bowel sounds are normal. There is no distension.      Palpations: Abdomen is soft. There is no mass.      Tenderness: There is no abdominal tenderness. There is no guarding or rebound.   Musculoskeletal:         General: No tenderness or deformity. Normal range of motion.      Cervical back: Normal range of motion and neck supple.   Skin:     General: Skin is warm and dry.      Coloration: Skin is not pale.      Findings: No erythema or rash.   Neurological:      Mental Status: She is alert and oriented to person, place, and time.      Cranial Nerves: No cranial nerve deficit.      Motor: No abnormal muscle tone.      Coordination: Coordination normal.   Psychiatric:         Behavior: Behavior normal.         Thought Content: Thought content normal.                Significant Labs: All pertinent labs within the past 24 hours have been reviewed.    Significant Imaging: I have reviewed all pertinent imaging results/findings within the past 24 hours.  Assessment/Plan:     * Acute on chronic diastolic congestive heart failure  Patient is identified as having Diastolic (HFpEF) heart failure that is Acute on chronic. CHF is currently uncontrolled due to Pulmonary edema/pleural effusion on CXR. Latest ECHO performed and demonstrates- Results for orders placed during the hospital  encounter of 07/12/22    Echo    Interpretation Summary  · Concentric hypertrophy and normal systolic function.  · The estimated ejection fraction is 55%.  · Normal left ventricular diastolic function.  · Normal right ventricular size with normal right ventricular systolic function.  · Moderate tricuspid regurgitation.  · There is pulmonary hypertension.  . Continue Beta Blocker and ACE/ARB and monitor clinical status closely. Monitor on telemetry. Patient is off CHF pathway.  Monitor strict Is&Os and daily weights.  Place on fluid restriction of 1.5 L. Cardiology has not been  consulted. Continue to stress to patient importance of self efficacy and  on diet for CHF. Last BNP reviewed- and noted below   Recent Labs   Lab 11/13/23  1620   BNP 3,585*   .    Received Urgent HD in ED for UF  Monitor for need for repeat HD in am     Hypoglycemia  Monitor blood glucose   Hold home Insulin for now -may need to adjust dose   NISS      Elevated troponin  Trop 0.130 (last troponin 0.258 on 8/3/23)  Appears chronically elevated   No CP  Serial troponin   Cont ASA< BB, Statin, ARB      ESRD (end stage renal disease)  Creatine stable for now. BMP reviewed- noted Estimated Creatinine Clearance: 5.7 mL/min (A) (based on SCr of 8.3 mg/dL (H)). according to latest data. Based on current GFR, CKD stage is end stage.  Monitor UOP and serial BMP and adjust therapy as needed. Renally dose meds. Avoid nephrotoxic medications and procedures.    Consult nephrology     CAD (coronary artery disease)  Patient with known CAD s/p CABG, which is controlled Will continue  BB, ARB, , ASA, and Statin and monitor for S/Sx of angina/ACS. Continue to monitor on telemetry.     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, with long-term current use of insulin  Patient's FSGs are uncontrolled due to hypoglycemia on current medication regimen.  Last A1c reviewed-   Lab Results   Component Value Date    HGBA1C 6.6 (H)  04/04/2023     Most recent fingerstick glucose reviewed-   Recent Labs   Lab 11/13/23  1619 11/13/23  2303 11/13/23  2341 11/14/23  0052   POCTGLUCOSE 116* 52* 77 186*     Current correctional scale  Low  Maintain anti-hyperglycemic dose as follows-   Antihyperglycemics (From admission, onward)      Start     Stop Route Frequency Ordered    11/13/23 2140  insulin aspart U-100 pen 0-5 Units         -- SubQ Before meals & nightly PRN 11/13/23 2041          Hold Oral hypoglycemics while patient is in the hospital.      VTE Risk Mitigation (From admission, onward)           Ordered     heparin (porcine) injection 5,000 Units  Every 8 hours         11/13/23 2041     IP VTE HIGH RISK PATIENT  Once         11/13/23 2041     Place sequential compression device  Until discontinued         11/13/23 2041                                   Taylor Adams NP  Department of Hospital Medicine  [facility]

## 2023-11-14 NOTE — ASSESSMENT & PLAN NOTE
Trop 0.130 (last troponin 0.258 on 8/3/23)  Appears chronically elevated   No CP  Serial troponin   Cont ASA< BB, Statin, ARB

## 2023-11-14 NOTE — PLAN OF CARE
Discussed poc with pt, verbalized understanding     Purposeful rounding every 2hours    VS wnl  Cardiac monitoring in use  Fall precautions in place, remains injury free  Pt denies c/o n/v and pain    Accurate I&Os  Bed locked at lowest position  Call light within reach    Chart check complete  Will cont with POC    Problem: Device-Related Complication Risk (Hemodialysis)  Goal: Safe, Effective Therapy Delivery  Outcome: Ongoing, Progressing     Problem: Hemodynamic Instability (Hemodialysis)  Goal: Effective Tissue Perfusion  Outcome: Ongoing, Progressing     Problem: Infection (Hemodialysis)  Goal: Absence of Infection Signs and Symptoms  Outcome: Ongoing, Progressing     Problem: Adult Inpatient Plan of Care  Goal: Plan of Care Review  Outcome: Ongoing, Progressing  Goal: Patient-Specific Goal (Individualized)  Outcome: Ongoing, Progressing  Goal: Absence of Hospital-Acquired Illness or Injury  Outcome: Ongoing, Progressing  Goal: Optimal Comfort and Wellbeing  Outcome: Ongoing, Progressing  Goal: Readiness for Transition of Care  Outcome: Ongoing, Progressing     Problem: Infection  Goal: Absence of Infection Signs and Symptoms  Outcome: Ongoing, Progressing     Problem: Diabetes Comorbidity  Goal: Blood Glucose Level Within Targeted Range  Outcome: Ongoing, Progressing

## 2023-11-14 NOTE — ASSESSMENT & PLAN NOTE
Patient's FSGs are uncontrolled due to hypoglycemia on current medication regimen.  Last A1c reviewed-   Lab Results   Component Value Date    HGBA1C 6.6 (H) 04/04/2023     Most recent fingerstick glucose reviewed-   Recent Labs   Lab 11/13/23  1619 11/13/23  2303 11/13/23  2341 11/14/23  0052   POCTGLUCOSE 116* 52* 77 186*     Current correctional scale  Low  Maintain anti-hyperglycemic dose as follows-   Antihyperglycemics (From admission, onward)      Start     Stop Route Frequency Ordered    11/13/23 2140  insulin aspart U-100 pen 0-5 Units         -- SubQ Before meals & nightly PRN 11/13/23 2041          Hold Oral hypoglycemics while patient is in the hospital.

## 2023-11-14 NOTE — ASSESSMENT & PLAN NOTE
79 y/o female with ESRD on chronic HD presented with SOB and fluid overload:     ESRD (end stage renal disease)  ESRD pt on chronic HD q MWF at [facility]  On HD since 2020. H/o of DM and HTN     Presented with dyspnea secondary to fluid overload, pulmonary edema is quite significant on CXR  K normal  Na normal  Metabolic acidosis   HTN: BP not ideally controlled, due to intravascular fluid congestion  Anemia, mild     Meds reviewed  Receiving HD currently, tolerating HD well, continue HD and fluid removal  Pt was advised of ways to reduce thirst and control fludi gain, including avoiding sweet and salty foods  Fluid restriction < 1 L/day advised  Advised pt that both fluid gain and rapid HD fluid removal can injure the heart.  May require HD again in am     Recommend admit for observation      URI symptoms  COVID test is negative  Sinusitis, viral syndrome?      Type 2 diabetes mellitus with hyperglycemia, with long-term current use of insulin  Reviewed the chart.        Plans and recommendations:  As discussed above  Total time spent 70 minutes including time needed to review the records, the   patient evaluation, documentation, face-to-face discussion with the patient,   more than 50% of the time was spent on coordination of care and counseling.

## 2023-11-14 NOTE — PROGRESS NOTES
"[facility]  Nephrology  Progress Note    Patient Name: Avril Monzon  MRN: [id number]  Admission Date: 11/13/2023  Hospital Length of Stay: 1 days  Attending Provider: Facundo Dunlap MD   Primary Care Physician: Rose Germain MD  Principal Problem:Acute on chronic diastolic congestive heart failure    Subjective:     HPI: Thank you for referring the pt to us. H/o and chart were reviewed. Pt was seen and examined. Pt is a 77 y/o female with h/o of ESRD on chronic HD q MWF at [facility], on HD since July 2020, h/o of HTN and DM, who presented for SOB. Pt missed HD today, because she had URI symptoms and was not feeling well. Pt had congestion and stuffiness x 2 days. Denies fever, no cough, no CP> Has been on Hd x 1 hour, already is beginning to feel better, less SOB.    Interval History: Pt was seen and examined. Labs and meds reviewed. Discussed with other providers.  Pt feels "a lot better", no further SOB, no new c/o's, no cough, no fever, no CP. Feels ready to go home.    Review of patient's allergies indicates:   Allergen Reactions    Norvasc [amlodipine] Shortness Of Breath    Codeine Swelling    Darvon [propoxyphene] Swelling    Atorvastatin      Muscle twitching     Current Facility-Administered Medications   Medication Frequency    acetaminophen tablet 650 mg Q4H PRN    albuterol-ipratropium 2.5 mg-0.5 mg/3 mL nebulizer solution 3 mL Q6H PRN    aspirin EC tablet 81 mg Daily    dextrose 10% bolus 125 mL 125 mL PRN    dextrose 10% bolus 250 mL 250 mL PRN    folic acid tablet 1 mg Daily    glucagon (human recombinant) injection 1 mg PRN    glucose chewable tablet 16 g PRN    glucose chewable tablet 24 g PRN    heparin (porcine) injection 5,000 Units Q8H    HYDROcodone-acetaminophen 5-325 mg per tablet 1 tablet Q6H PRN    insulin aspart U-100 pen 0-5 Units QID (AC + HS) PRN    losartan tablet 25 mg Daily    melatonin tablet 6 mg Nightly PRN    metoprolol tartrate (LOPRESSOR) tablet 50 mg BID    " naloxone 0.4 mg/mL injection 0.02 mg PRN    ondansetron injection 4 mg Q6H PRN    pantoprazole EC tablet 40 mg Daily    polyethylene glycol packet 17 g BID PRN    pravastatin tablet 20 mg QHS    prochlorperazine injection Soln 5 mg Q6H PRN    senna-docusate 8.6-50 mg per tablet 1 tablet BID    sevelamer carbonate tablet 1,600 mg TID WM    simethicone chewable tablet 80 mg QID PRN    sodium chloride 0.9% flush 10 mL Q8H PRN       Objective:     Vital Signs (Most Recent):  Temp: 98.6 °F (37 °C) (11/14/23 0806)  Pulse: 69 (11/14/23 0934)  Resp: 18 (11/14/23 0806)  BP: 127/60 (11/14/23 0806)  SpO2: 100 % (11/14/23 0806) Vital Signs (24h Range):  Temp:  [97.7 °F (36.5 °C)-98.9 °F (37.2 °C)] 98.6 °F (37 °C)  Pulse:  [65-72] 69  Resp:  [16-22] 18  SpO2:  [87 %-100 %] 100 %  BP: (103-192)/() 127/60     Weight: 60.9 kg (134 lb 4.2 oz) (11/14/23 0900)  Body mass index is 22.34 kg/m².  Body surface area is 1.67 meters squared.    I/O last 3 completed shifts:  In: 0   Out: 2500 [Other:2500]     Physical Exam  Vitals and nursing note reviewed.   Constitutional:       Appearance: Normal appearance.   Cardiovascular:      Rate and Rhythm: Normal rate and regular rhythm.   Pulmonary:      Effort: Pulmonary effort is normal.      Breath sounds: Normal breath sounds. No rales.   Abdominal:      General: Abdomen is flat.      Tenderness: There is no abdominal tenderness.   Musculoskeletal:      Right lower leg: No edema.      Left lower leg: No edema.   Neurological:      Mental Status: She is alert and oriented to person, place, and time.   Psychiatric:         Behavior: Behavior normal.          Significant Labs: Reviewed  BMP  Lab Results   Component Value Date     11/14/2023    K 3.9 11/14/2023    CL 96 11/14/2023    CO2 28 11/14/2023    BUN 18 11/14/2023    CREATININE 4.5 (H) 11/14/2023    CALCIUM 8.8 11/14/2023    ANIONGAP 13 11/14/2023    EGFRNORACEVR 10 (A) 11/14/2023     Lab Results   Component Value Date    WBC  5.89 11/14/2023    HGB 10.2 (L) 11/14/2023    HCT 32.3 (L) 11/14/2023    MCV 99 (H) 11/14/2023     11/14/2023         Significant Imaging: Reviewed CXR  Assessment/Plan:     77 y/o female with ESRD on chronic HD presented with SOB and fluid overload:     ESRD (end stage renal disease)  ESRD pt on chronic HD q MWF at [facility]  On HD since 2020. H/o of DM and HTN     Presented with dyspnea secondary to fluid overload: good response to HD last pm, symptoms have resolved.  K is normal  Na normal  Metabolic acidosis: improved with HD   HTN: BP not ideally controlled, due to intravascular fluid congestion improved post UF and HD  Anemia, mild     Meds reviewed  OK to d/c home  Pt was again advised of ways to reduce thirst and control fludi gain, including avoiding sweet and salty foods  Fluid restriction < 1 L/day advised  Advised pt that both fluid gain and rapid HD fluid removal can injure the heart.      URI symptoms  COVID test is negative  Sinusitis, viral syndrome?  Symptoms appear resolved      Type 2 diabetes mellitus with hyperglycemia, with long-term current use of insulin  Reviewed the chart.        Plans and recommendations:  As discussed above  Total time spent 40 minutes including time needed to review the records, the   patient evaluation, documentation, face-to-face discussion with the patient,   more than 50% of the time was spent on coordination of care and counseling.            Angela Hanson MD  Nephrology  [facility]

## 2023-11-22 ENCOUNTER — HOSPITAL ENCOUNTER (OUTPATIENT)
Dept: RADIOLOGY | Facility: HOSPITAL | Age: 78
Discharge: HOME | End: 2023-11-22
Attending: NURSE PRACTITIONER
Payer: MEDICARE

## 2023-11-22 ENCOUNTER — TELEPHONE (OUTPATIENT)
Dept: PRIMARY CARE CLINIC | Facility: CLINIC | Age: 78
End: 2023-11-22
Payer: MEDICARE

## 2023-11-22 ENCOUNTER — OFFICE VISIT (OUTPATIENT)
Dept: PRIMARY CARE CLINIC | Facility: CLINIC | Age: 78
End: 2023-11-22
Payer: MEDICARE

## 2023-11-22 VITALS
WEIGHT: 134.5 LBS | BODY MASS INDEX: 22.41 KG/M2 | TEMPERATURE: 98 F | DIASTOLIC BLOOD PRESSURE: 52 MMHG | HEIGHT: 65 IN | SYSTOLIC BLOOD PRESSURE: 164 MMHG | HEART RATE: 68 BPM | OXYGEN SATURATION: 98 %

## 2023-11-22 DIAGNOSIS — E11.42 DM TYPE 2 WITH DIABETIC PERIPHERAL NEUROPATHY: ICD-10-CM

## 2023-11-22 DIAGNOSIS — M25.571 ACUTE RIGHT ANKLE PAIN: ICD-10-CM

## 2023-11-22 DIAGNOSIS — E16.2 HYPOGLYCEMIA: ICD-10-CM

## 2023-11-22 DIAGNOSIS — I50.33 DIASTOLIC DYSFUNCTION WITH ACUTE ON CHRONIC HEART FAILURE: ICD-10-CM

## 2023-11-22 DIAGNOSIS — R82.90 MALODOROUS URINE: Primary | ICD-10-CM

## 2023-11-22 PROCEDURE — 1111F PR DISCHARGE MEDS RECONCILED W/ CURRENT OUTPATIENT MED LIST: ICD-10-PCS | Mod: HCNC,CPTII,S$GLB, | Performed by: NURSE PRACTITIONER

## 2023-11-22 PROCEDURE — 1157F ADVNC CARE PLAN IN RCRD: CPT | Mod: HCNC,CPTII,S$GLB, | Performed by: NURSE PRACTITIONER

## 2023-11-22 PROCEDURE — 73610 X-RAY EXAM OF ANKLE: CPT | Mod: TC,HCNC,FY,PO,RT

## 2023-11-22 PROCEDURE — 3288F FALL RISK ASSESSMENT DOCD: CPT | Mod: HCNC,CPTII,S$GLB, | Performed by: NURSE PRACTITIONER

## 2023-11-22 PROCEDURE — 1159F PR MEDICATION LIST DOCUMENTED IN MEDICAL RECORD: ICD-10-PCS | Mod: HCNC,CPTII,S$GLB, | Performed by: NURSE PRACTITIONER

## 2023-11-22 PROCEDURE — 1111F DSCHRG MED/CURRENT MED MERGE: CPT | Mod: HCNC,CPTII,S$GLB, | Performed by: NURSE PRACTITIONER

## 2023-11-22 PROCEDURE — 73610 X-RAY EXAM OF ANKLE: CPT | Mod: 26,HCNC,RT, | Performed by: RADIOLOGY

## 2023-11-22 PROCEDURE — 1101F PT FALLS ASSESS-DOCD LE1/YR: CPT | Mod: HCNC,CPTII,S$GLB, | Performed by: NURSE PRACTITIONER

## 2023-11-22 PROCEDURE — 99214 PR OFFICE/OUTPT VISIT, EST, LEVL IV, 30-39 MIN: ICD-10-PCS | Mod: HCNC,S$GLB,, | Performed by: NURSE PRACTITIONER

## 2023-11-22 PROCEDURE — 3077F PR MOST RECENT SYSTOLIC BLOOD PRESSURE >= 140 MM HG: ICD-10-PCS | Mod: HCNC,CPTII,S$GLB, | Performed by: NURSE PRACTITIONER

## 2023-11-22 PROCEDURE — 3077F SYST BP >= 140 MM HG: CPT | Mod: HCNC,CPTII,S$GLB, | Performed by: NURSE PRACTITIONER

## 2023-11-22 PROCEDURE — 1159F MED LIST DOCD IN RCRD: CPT | Mod: HCNC,CPTII,S$GLB, | Performed by: NURSE PRACTITIONER

## 2023-11-22 PROCEDURE — 3288F PR FALLS RISK ASSESSMENT DOCUMENTED: ICD-10-PCS | Mod: HCNC,CPTII,S$GLB, | Performed by: NURSE PRACTITIONER

## 2023-11-22 PROCEDURE — 99999 PR PBB SHADOW E&M-EST. PATIENT-LVL IV: ICD-10-PCS | Mod: PBBFAC,HCNC,, | Performed by: NURSE PRACTITIONER

## 2023-11-22 PROCEDURE — 1157F PR ADVANCE CARE PLAN OR EQUIV PRESENT IN MEDICAL RECORD: ICD-10-PCS | Mod: HCNC,CPTII,S$GLB, | Performed by: NURSE PRACTITIONER

## 2023-11-22 PROCEDURE — 99214 OFFICE O/P EST MOD 30 MIN: CPT | Mod: HCNC,S$GLB,, | Performed by: NURSE PRACTITIONER

## 2023-11-22 PROCEDURE — 3078F DIAST BP <80 MM HG: CPT | Mod: HCNC,CPTII,S$GLB, | Performed by: NURSE PRACTITIONER

## 2023-11-22 PROCEDURE — 73610 XR ANKLE COMPLETE 3 VIEW RIGHT: ICD-10-PCS | Mod: 26,HCNC,RT, | Performed by: RADIOLOGY

## 2023-11-22 PROCEDURE — 3078F PR MOST RECENT DIASTOLIC BLOOD PRESSURE < 80 MM HG: ICD-10-PCS | Mod: HCNC,CPTII,S$GLB, | Performed by: NURSE PRACTITIONER

## 2023-11-22 PROCEDURE — 1101F PR PT FALLS ASSESS DOC 0-1 FALLS W/OUT INJ PAST YR: ICD-10-PCS | Mod: HCNC,CPTII,S$GLB, | Performed by: NURSE PRACTITIONER

## 2023-11-22 PROCEDURE — 99999 PR PBB SHADOW E&M-EST. PATIENT-LVL IV: CPT | Mod: PBBFAC,HCNC,, | Performed by: NURSE PRACTITIONER

## 2023-11-22 NOTE — TELEPHONE ENCOUNTER
CHELSY on AM for daughter in regards to patients ankle x-ray fine and that she can ace wrap ankle and use Tylenol for pain. ANABELL Walton RN

## 2023-11-22 NOTE — PROGRESS NOTES
Avril Monzon  11/22/2023  [id number]    Rose Germain MD  Patient Care Team:  Rose Germain MD as PCP - General (Internal Medicine)  Conchita Garcia RD, CDE as Dietitian (Diabetes)  Debbie Wagner RD (Inactive) as Dietitian (Nutrition)  Angela Hanson MD as Consulting Physician (Nephrology)  Demetrio Mejia MD as Consulting Physician (Cardiology)  Conchita Garcia RD, CDE as Diabetes Educator (Diabetes)  Alina Bauer DPM as Consulting Physician (Podiatry)  Anette Montanez DPM as Consulting Physician (Podiatry)  JADIEL Coello MD as Consulting Physician (Ophthalmology)          [facility] Note    Family and/or Caretaker present at visit?  No.  Diagnostic tests reviewed/disposition: I have reviewed all completed as well as pending diagnostic tests at the time of discharge.  Home health/community services discussion/referrals: Patient does not have home health established from hospital visit.  They do not need home health.  If needed, we will set up home health for the patient.   Establishment or re-establishment of referral orders for community resources: No other necessary community resources.   Discussion with other health care providers: No discussion with other health care providers necessary.     HPI:   The patient is a 77 yo female with ESRD on dialysis M/W/F, CAD s/p CABG 7/2022, DM2, HLD, HTN, remote CVA, Diastolic CHF who presented to ED with SOB-onset today that gradually worsened. Pt missed HD on 11/13/23 due to generalized malaise, nasal congestion, body aches, and sneezing x 2 days. Pt was also hypoglycemic at home PTA with blood sugars 30s. Her daughter gave her COke and cookies to bring blood sugar up. On EMS arrival, hypoglycemia resolved.      IN the ED, Afebrile, O2sat 87% on room air- 2 liters oxygen NC applied, Labs revealed normal WBC, Bicarb 20, Glucose 114, BNP 3585, Trop 0.130 (last troponin 0.258 on 8/3/23). COVID/Flu negative. CXR showed  cardiomegaly, pulmonary venous hypertension, left pleural effusion      Nephrology evaluated pt in the ED and pt underwent urgent HD. After HD, nephrology recommended observation due to possible need for 2nd HD tomorrow for volume overload.   While in the ED, Blood sugar 52. Pt was given juice, sandwich, and apple sauce. Blood sugar improved to 77.   Currently, pt states she feels much improved. SOB, nasal congestion, body aches etc have all improved.      * No surgery found *       Hospital Course:   11/14  Admitted for urgent hemodialysis after missing dialysis, causing acute hypoxic respiratory failure due to pulmonary edema. Tolerated urgent dialysis. After discussing with nephrology, ok to discharge home to resume regular maintenance dialysis. Patient endorses significant improvement in symptoms.      On exam, no acute distress. No respiratory distress, wearing nasal cannula. Sleeping but easily awakens. Underweight.    Ms. Monzon returned today for F/U for recent hospitalization for acute hypoxic respiratory failure due to pulmonary edema secondary to ESRD on HD.   Tired past dialysis, other days of week for physical therapy and doctors appointment.   She feels well when resting but fatigues easily with exertion  Reports Foul odor with urine - white vaginal discharge.  C/o right ankle pain x 1 week - noted with walking. Tylenol does not help.     Denies fever, chills, URI symptoms, chest pain, palpitations, vomiting, diarrhea, no gross neuro deficits, leg swelling      The following were reviewed: Active problem list, medication list, allergies, family history, social history, and Health Maintenance.     History:  Past Medical History:   Diagnosis Date    Acute hypoxemic respiratory failure 11/26/2018    Anemia     Arthritis     back, hand, knees    Back pain     Cataract     CKD stage G4/A3, GFR 15 - 29 and albumin creatinine ratio >300 mg/g     GFR 40% Jan 2014 and 33% ub 3/2014 (BRG)    Coronary artery  disease involving native coronary artery of native heart 11/30/2018    Diabetic retinopathy     DM (diabetes mellitus) type II controlled, neurological manifestation     Exudative age-related macular degeneration of left eye with active choroidal neovascularization 2/5/2019    GERD (gastroesophageal reflux disease)     Glaucoma     Heart failure     Hyperlipidemia     Hypertension     Non-ST elevation MI (NSTEMI) 11/28/2018    NSTEMI (non-ST elevated myocardial infarction) 11/27/2018    Peripheral neuropathy     Polyneuropathy     Proteinuria     >6 mo     Seizures     BRG 1/2014 -dick CT NRI showed small vessel    Skin ulcer of toe of right foot with fat layer exposed 10/14/2022    Stroke 2012,2014    Tobacco dependence     Type 2 diabetes with peripheral circulatory disorder, controlled      Past Surgical History:   Procedure Laterality Date    BREAST LUMPECTOMY Left     benign, when patient was 15 years old    BREAST MASS EXCISION      ,benign, age 15.    CATHETERIZATION OF BOTH LEFT AND RIGHT HEART N/A 11/28/2018    Procedure: CATHETERIZATION, HEART, BOTH LEFT AND RIGHT;  Surgeon: Michelle Holman MD;  Location: [facility] CATH LAB;  Service: Cardiology;  Laterality: N/A;    CATHETERIZATION OF BOTH LEFT AND RIGHT HEART N/A 11/30/2018    Procedure: CATHETERIZATION, HEART, BOTH LEFT AND RIGHT;  Surgeon: Michelle Holman MD;  Location: [facility] CATH LAB;  Service: Cardiology;  Laterality: N/A;    CORONARY ARTERY BYPASS GRAFT      CORONARY ARTERY BYPASS GRAFT (CABG) N/A 7/20/2022    Procedure: CORONARY ARTERY BYPASS GRAFT (CABG);  Surgeon: Sanju Locke MD;  Location: [facility];  Service: Cardiothoracic;  Laterality: N/A;  2-VESSEL PUMP ASSIST WITH EPI-AORTIC ULTRASOUND    ENDOSCOPIC HARVEST OF VEIN Left 7/20/2022    Procedure: SURGICAL PROCUREMENT, VEIN, ENDOSCOPIC;  Surgeon: Sanju Locke MD;  Location: [facility];  Service: Cardiothoracic;  Laterality: Left;    HYSTERECTOMY  1982    INJECTION OF ANESTHETIC AGENT AROUND  MULTIPLE INTERCOSTAL NERVES N/A 7/20/2022    Procedure: BLOCK, NERVE, INTERCOSTAL, 2 OR MORE;  Surgeon: Sanju Locke MD;  Location: [facility] OR;  Service: Cardiothoracic;  Laterality: N/A;  PARASTERNAL NERVE BLOCK    INSERTION OF SHUNT      LEFT HEART CATHETERIZATION Left 12/3/2018    Procedure: CATHETERIZATION, HEART, LEFT;  Surgeon: Michelle Holman MD;  Location: [facility] CATH LAB;  Service: Cardiology;  Laterality: Left;  LAD ATHERECTOMY STENT/ FEMORAL APPROACH    LEFT HEART CATHETERIZATION Left 7/13/2022    Procedure: CATHETERIZATION, HEART, LEFT;  Surgeon: Abhi Sloan MD;  Location: [facility] CATH LAB;  Service: Cardiology;  Laterality: Left;    OOPHORECTOMY      wrist cyst Right 1980s    dorsal wrist cyst     Family History   Problem Relation Age of Onset    Diabetes Mother     Hyperlipidemia Mother     Hypertension Mother     Heart disease Mother         MI    Muscular dystrophy Son     Cancer Maternal Grandmother         colon     Patient Active Problem List   Diagnosis    Constipation    Hypertension associated with diabetes    ESRD (end stage renal disease)    Proteinuria    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, with long-term current use of insulin    Cataracta brunescens of right eye    Bilateral carotid artery disease    Aortic atherosclerosis    Type 2 diabetes mellitus with circulatory disorder, with long-term current use of insulin    History of CVA (cerebrovascular accident)    Hyperparathyroidism    Vitamin D deficiency    DDD (degenerative disc disease), lumbar    Anemia in chronic kidney disease, on chronic dialysis    Iron deficiency anemia due to chronic blood loss    Hyperlipidemia associated with type 2 diabetes mellitus    Pulmonary hypertension    CAD (coronary artery disease)    Exudative age-related macular degeneration of left eye with active choroidal neovascularization    Non-rheumatic mitral regurgitation    Non-rheumatic tricuspid valve  insufficiency    Age-related nuclear cataract of left eye    DM type 2 with diabetic peripheral neuropathy    Unspecified inflammatory spondylopathy, lumbar region    Convulsions    Hemiplegia    Other specified complication of vascular prosthetic devices, implants and grafts, initial encounter    Elevated troponin    Chronic combined systolic and diastolic heart failure    Hyperkalemia    Metabolic acidosis    S/P CABG (coronary artery bypass graft)    COVID    Skin ulcer of toe of right foot with fat layer exposed    Type 2 diabetes mellitus with diabetic peripheral angiopathy without gangrene, with long-term current use of insulin    Allergic rhinitis    Other reduced mobility    Nausea & vomiting    Dysuria    Debility    Pleural effusion    Acute bilateral low back pain without sciatica    Anemia    Cerebrovascular accident    Hypertensive disorder    Bilateral pleural effusion    Syncope    Contusion of left shoulder    Fall    Sprain of left shoulder    Diastolic dysfunction with acute on chronic heart failure    Hypoglycemia    Malodorous urine    Acute right ankle pain     Review of patient's allergies indicates:   Allergen Reactions    Norvasc [amlodipine] Shortness Of Breath    Codeine Swelling    Darvon [propoxyphene] Swelling    Atorvastatin      Muscle twitching       Medications:  Current Outpatient Medications on File Prior to Visit   Medication Sig Dispense Refill    aspirin (ECOTRIN) 81 MG EC tablet Take 1 tablet (81 mg total) by mouth once daily. 90 tablet 6    atropine 1% (ISOPTO ATROPINE) 1 % Drop Place 1 drop into the left eye 3 (three) times daily.      azelastine (ASTELIN) 137 mcg (0.1 %) nasal spray 1 spray (137 mcg total) by Nasal route 2 (two) times daily. 30 mL 0    blood sugar diagnostic (TRUE METRIX GLUCOSE TEST STRIP) Strp 1 strip by Misc.(Non-Drug; Combo Route) route 3 (three) times daily. 100 each 11    cyanocobalamin (VITAMIN B-12) 1000 MCG tablet Take 100 mcg by mouth once daily.       folic acid (FOLVITE) 1 MG tablet Take 1 mg by mouth once daily.      insulin aspart protamine-insulin aspart (NOVOLOG MIX 70-30FLEXPEN U-100) 100 unit/mL (70-30) InPn pen Inject 22 units into skin before breakfast, 10 units before dinner. On dialysis days, give 12 units before dinner. 15 mL 3    LIDOcaine (LIDODERM) 5 % Place 1 patch onto the skin once daily. 15 patch 0    loratadine (CLARITIN) 10 mg tablet Take 1 tablet (10 mg total) by mouth once daily. 30 tablet 3    metoprolol tartrate (LOPRESSOR) 50 MG tablet Take 1 tablet (50 mg total) by mouth 2 (two) times daily. 180 tablet 11    pantoprazole (PROTONIX) 40 MG tablet Take 1 tablet (40 mg total) by mouth once daily. 90 tablet 3    prednisoLONE acetate (PRED FORTE) 1 % DrpS Place 1 drop into both eyes 4 (four) times daily.      rosuvastatin (CRESTOR) 20 MG tablet Take 1 tablet (20 mg total) by mouth once daily. 90 tablet 3    sevelamer carbonate (RENVELA) 800 mg Tab Take 3 tablets (2,400 mg) by mouth three times a day with meals and 2 tablets (1,600 mg) by mouth with snacks 1170 tablet 3    telmisartan (MICARDIS) 20 MG Tab Take 20 mg by mouth once daily.      traMADoL (ULTRAM) 50 mg tablet Take 1 tablet (50 mg total) by mouth every 6 (six) hours as needed for Pain. 30 each 0    [DISCONTINUED] isosorbide-hydrALAZINE 20-37.5 mg (BIDIL) 20-37.5 mg Tab Take 1 tablet by mouth 3 (three) times daily. 90 tablet 1     No current facility-administered medications on file prior to visit.       Medications have been reviewed and reconciled with patient at visit today.      Exam:  Vitals:    11/22/23 1109   BP: (!) 164/52   Pulse: 68   Temp: 98 °F (36.7 °C)     Weight: 61 kg (134 lb 8 oz)   Body mass index is 22.38 kg/m².      BP Readings from Last 3 Encounters:   11/22/23 (!) 164/52   11/14/23 135/64   09/28/23 135/63     Wt Readings from Last 3 Encounters:   11/22/23 1109 61 kg (134 lb 8 oz)   11/14/23 0900 60.9 kg (134 lb 4.2 oz)   11/14/23 0600 60.9 kg (134 lb 4.2  oz)   11/13/23 1634 70.1 kg (154 lb 8.7 oz)   09/22/23 1354 61.5 kg (135 lb 8 oz)        REVIEW OF SYSTEMS  Review of Systems   Constitutional:  Positive for malaise/fatigue. Negative for chills and fever.   HENT:  Negative for congestion and sore throat.    Respiratory:  Positive for shortness of breath. Negative for cough.    Cardiovascular:  Negative for chest pain, palpitations and leg swelling.   Gastrointestinal:  Negative for abdominal pain, diarrhea, nausea and vomiting.   Genitourinary:  Negative for dysuria and frequency.   Musculoskeletal:  Negative for back pain and myalgias.   Neurological:  Negative for dizziness, focal weakness and headaches.   Psychiatric/Behavioral:  Negative for depression. The patient is not nervous/anxious.        Physical Exam  Vitals reviewed.   Constitutional:       General: She is not in acute distress.     Appearance: Normal appearance.   HENT:      Head: Normocephalic and atraumatic.      Nose: Nose normal.      Mouth/Throat:      Mouth: Mucous membranes are moist.   Eyes:      General: No scleral icterus.     Conjunctiva/sclera: Conjunctivae normal.   Cardiovascular:      Rate and Rhythm: Normal rate. Rhythm irregular.      Heart sounds: Murmur heard.   Pulmonary:      Effort: Pulmonary effort is normal. No respiratory distress.      Breath sounds: Normal breath sounds.   Abdominal:      Palpations: Abdomen is soft. There is no mass.      Tenderness: There is no abdominal tenderness.   Musculoskeletal:         General: No swelling or deformity. Normal range of motion.      Cervical back: Normal range of motion and neck supple.      Right lower leg: No edema.      Left lower leg: No edema.   Lymphadenopathy:      Cervical: No cervical adenopathy.   Skin:     General: Skin is warm and dry.   Neurological:      Mental Status: She is alert and oriented to person, place, and time. Mental status is at baseline.   Psychiatric:         Mood and Affect: Mood normal.         Thought  Content: Thought content normal.         Laboratory Reviewed:     Lab Results   Component Value Date    WBC 5.89 11/14/2023    HGB 10.2 (L) 11/14/2023    HCT 32.3 (L) 11/14/2023     11/14/2023    CHOL 171 07/13/2022    TRIG 46 07/13/2022    HDL 59 07/13/2022    ALT 15 11/14/2023    AST 14 11/14/2023     11/14/2023    K 3.9 11/14/2023    CL 96 11/14/2023    CREATININE 4.5 (H) 11/14/2023    BUN 18 11/14/2023    CO2 28 11/14/2023    TSH 1.686 08/04/2023    INR 1.0 08/07/2022    HGBA1C 6.6 (H) 04/04/2023       Screening or Prevention Patient's value Goal Complete/Controlled?   HgA1C Testing and Control   Lab Results   Component Value Date    HGBA1C 6.6 (H) 04/04/2023      Annually/Less than 8% Yes   Lipid profile : 06/28/2023 Annually No   LDL control Lab Results   Component Value Date    LDLCALC 102.8 07/13/2022    Annually/Less than 100 mg/dl  No   Nephropathy screening Lab Results   Component Value Date    LABMICR 1555.0 06/13/2018     Lab Results   Component Value Date    PROTEINUA 3+ (A) 08/26/2022    Annually No   Blood pressure BP Readings from Last 1 Encounters:   11/22/23 (!) 164/52    Less than 140/90 Yes   Dilated retinal exam : 09/01/2022 Annually No   Foot exam   : 03/08/2022 Annually No       Health Maintenance  Health Maintenance Topics with due status: Not Due       Topic Last Completion Date    TETANUS VACCINE 12/12/2021    Lipid Panel 06/28/2023     Health Maintenance Due   Topic Date Due    Shingles Vaccine (1 of 2) Never done    DEXA Scan  Never done    RSV Vaccine (Age 60+ and Pregnant patients) (1 - 1-dose 60+ series) Never done    Foot Exam  03/08/2023    COVID-19 Vaccine (4 - 2023-24 season) 09/01/2023    Eye Exam  09/01/2023    Hemoglobin A1c  10/04/2023       Assessment and Plan:  1. Malodorous urine  -     Cancel: Urinalysis, Reflex to Urine Culture Urine, Clean Catch  -     Urinalysis, Reflex to Urine Culture Urine, Clean Catch; Future; Expected date: 11/22/2023    2. Acute right  ankle pain  -     Cancel: X-Ray Ankle 2 View Right; Future; Expected date: 11/22/2023    3. Hypoglycemia    4. Diastolic dysfunction with acute on chronic heart failure  Assessment & Plan:  Resume dialysis  Will try a later chair time if misses the 5:30 AM dialysis      5. DM type 2 with diabetic peripheral neuropathy  Assessment & Plan:  Hypoglycemia resolved                   -Patient's lab results were reviewed and discussed with patient  -Treatment options and alternatives were discussed with the patient. Patient expressed understanding. Patient was given the opportunity to ask questions and be an active participant in their medical care. Patient had no further questions or concerns at this time.         Future Appointments   Date Time Provider Department Center   12/5/2023  8:30 AM LABORATORY, [facility]   12/8/2023  2:30 PM Moriah Tran, KIAN [facility] CARDIO [facility]   12/19/2023  4:30 PM Natty Mistry NP [facility] DIABETE [facility]   1/11/2024  3:40 PM Rose Germain MD [facility]          After visit summary printed and given to patient upon discharge.  Patient goals and care plan are included in After visit summary.        The following issues were discussed: The primary encounter diagnosis was Malodorous urine. Diagnoses of Acute right ankle pain, Hypoglycemia, Diastolic dysfunction with acute on chronic heart failure, and DM type 2 with diabetic peripheral neuropathy were also pertinent to this visit.    Health maintenance needs, recent test results and goals of care discussed with pt and questions answered.           ADEBAYO Bagley, NP-C  [facility] [address] [place] [address]

## 2023-11-28 ENCOUNTER — OFFICE VISIT (OUTPATIENT)
Dept: PRIMARY CARE CLINIC | Facility: CLINIC | Age: 78
End: 2023-11-28
Payer: MEDICARE

## 2023-11-28 VITALS
WEIGHT: 133.13 LBS | HEIGHT: 65 IN | TEMPERATURE: 98 F | DIASTOLIC BLOOD PRESSURE: 65 MMHG | SYSTOLIC BLOOD PRESSURE: 125 MMHG | BODY MASS INDEX: 22.18 KG/M2 | OXYGEN SATURATION: 98 % | HEART RATE: 67 BPM

## 2023-11-28 DIAGNOSIS — N30.00 ACUTE CYSTITIS WITHOUT HEMATURIA: ICD-10-CM

## 2023-11-28 DIAGNOSIS — R05.1 ACUTE COUGH: Primary | ICD-10-CM

## 2023-11-28 PROCEDURE — 3078F DIAST BP <80 MM HG: CPT | Mod: HCNC,CPTII,S$GLB, | Performed by: FAMILY MEDICINE

## 2023-11-28 PROCEDURE — 1159F PR MEDICATION LIST DOCUMENTED IN MEDICAL RECORD: ICD-10-PCS | Mod: HCNC,CPTII,S$GLB, | Performed by: FAMILY MEDICINE

## 2023-11-28 PROCEDURE — 87804 INFLUENZA ASSAY W/OPTIC: CPT | Mod: QW,HCNC,S$GLB, | Performed by: FAMILY MEDICINE

## 2023-11-28 PROCEDURE — 3078F PR MOST RECENT DIASTOLIC BLOOD PRESSURE < 80 MM HG: ICD-10-PCS | Mod: HCNC,CPTII,S$GLB, | Performed by: FAMILY MEDICINE

## 2023-11-28 PROCEDURE — 3288F FALL RISK ASSESSMENT DOCD: CPT | Mod: HCNC,CPTII,S$GLB, | Performed by: FAMILY MEDICINE

## 2023-11-28 PROCEDURE — 1126F PR PAIN SEVERITY QUANTIFIED, NO PAIN PRESENT: ICD-10-PCS | Mod: HCNC,CPTII,S$GLB, | Performed by: FAMILY MEDICINE

## 2023-11-28 PROCEDURE — 99999 PR PBB SHADOW E&M-EST. PATIENT-LVL IV: CPT | Mod: PBBFAC,HCNC,, | Performed by: FAMILY MEDICINE

## 2023-11-28 PROCEDURE — 1111F DSCHRG MED/CURRENT MED MERGE: CPT | Mod: HCNC,CPTII,S$GLB, | Performed by: FAMILY MEDICINE

## 2023-11-28 PROCEDURE — 87811 SARS-COV-2 COVID19 W/OPTIC: CPT | Mod: QW,HCNC,S$GLB, | Performed by: FAMILY MEDICINE

## 2023-11-28 PROCEDURE — 3074F SYST BP LT 130 MM HG: CPT | Mod: HCNC,CPTII,S$GLB, | Performed by: FAMILY MEDICINE

## 2023-11-28 PROCEDURE — 87804 POCT INFLUENZA A/B: ICD-10-PCS | Mod: QW,HCNC,S$GLB, | Performed by: FAMILY MEDICINE

## 2023-11-28 PROCEDURE — 99214 PR OFFICE/OUTPT VISIT, EST, LEVL IV, 30-39 MIN: ICD-10-PCS | Mod: HCNC,S$GLB,, | Performed by: FAMILY MEDICINE

## 2023-11-28 PROCEDURE — 1126F AMNT PAIN NOTED NONE PRSNT: CPT | Mod: HCNC,CPTII,S$GLB, | Performed by: FAMILY MEDICINE

## 2023-11-28 PROCEDURE — 3074F PR MOST RECENT SYSTOLIC BLOOD PRESSURE < 130 MM HG: ICD-10-PCS | Mod: HCNC,CPTII,S$GLB, | Performed by: FAMILY MEDICINE

## 2023-11-28 PROCEDURE — 3288F PR FALLS RISK ASSESSMENT DOCUMENTED: ICD-10-PCS | Mod: HCNC,CPTII,S$GLB, | Performed by: FAMILY MEDICINE

## 2023-11-28 PROCEDURE — 87811 SARS CORONAVIRUS 2 ANTIGEN POCT, MANUAL READ: ICD-10-PCS | Mod: QW,HCNC,S$GLB, | Performed by: FAMILY MEDICINE

## 2023-11-28 PROCEDURE — 99214 OFFICE O/P EST MOD 30 MIN: CPT | Mod: HCNC,S$GLB,, | Performed by: FAMILY MEDICINE

## 2023-11-28 PROCEDURE — 1101F PT FALLS ASSESS-DOCD LE1/YR: CPT | Mod: HCNC,CPTII,S$GLB, | Performed by: FAMILY MEDICINE

## 2023-11-28 PROCEDURE — 1157F PR ADVANCE CARE PLAN OR EQUIV PRESENT IN MEDICAL RECORD: ICD-10-PCS | Mod: HCNC,CPTII,S$GLB, | Performed by: FAMILY MEDICINE

## 2023-11-28 PROCEDURE — 1157F ADVNC CARE PLAN IN RCRD: CPT | Mod: HCNC,CPTII,S$GLB, | Performed by: FAMILY MEDICINE

## 2023-11-28 PROCEDURE — 1101F PR PT FALLS ASSESS DOC 0-1 FALLS W/OUT INJ PAST YR: ICD-10-PCS | Mod: HCNC,CPTII,S$GLB, | Performed by: FAMILY MEDICINE

## 2023-11-28 PROCEDURE — 1159F MED LIST DOCD IN RCRD: CPT | Mod: HCNC,CPTII,S$GLB, | Performed by: FAMILY MEDICINE

## 2023-11-28 PROCEDURE — 1111F PR DISCHARGE MEDS RECONCILED W/ CURRENT OUTPATIENT MED LIST: ICD-10-PCS | Mod: HCNC,CPTII,S$GLB, | Performed by: FAMILY MEDICINE

## 2023-11-28 PROCEDURE — 99999 PR PBB SHADOW E&M-EST. PATIENT-LVL IV: ICD-10-PCS | Mod: PBBFAC,HCNC,, | Performed by: FAMILY MEDICINE

## 2023-11-28 RX ORDER — DEXTROMETHORPHAN HYDROBROMIDE AND GUAIFENESIN 10; 200 MG/1; MG/1
1 CAPSULE, GELATIN COATED ORAL EVERY 6 HOURS PRN
Qty: 30 EACH | Refills: 1 | Status: SHIPPED | OUTPATIENT
Start: 2023-11-28 | End: 2023-12-08

## 2023-11-28 RX ORDER — CEPHALEXIN 500 MG/1
500 CAPSULE ORAL EVERY 12 HOURS
Qty: 14 CAPSULE | Refills: 0 | Status: SHIPPED | OUTPATIENT
Start: 2023-11-28 | End: 2023-12-05

## 2023-11-28 NOTE — PATIENT INSTRUCTIONS
If you are feeling unwell, we'd like to be the first ones to know here at [facility]! Please give us a call. Same day appointments are our top priority to keep you well and out of the emergency rooms and hospitals. Call [phone] for our direct line. After hours advice is always available. Please call [phone] after hours to speak to the on-call team.       Coricedin HBP is an over the counter medication that I've sent to your pharmacy; it may not be covered but the pharmacist can help you find it.     Get extra rest, especially a good night's sleep.  Drink plenty of clear fluids to help thin mucus.   Use nasal saline spray several times a day to clear nasal drainage and help with nasal congestion and post-nasal drip.  Gargle with warm salt water several times a day as needed for throat comfort.  Over the counter medications that may be helpful:  Mucinex or Mucinex DM for thinning mucus and decreasing cough. Mucinex only works if you drink plenty of fluids while you are taking it.   Tylenol for fever, headache and body aches  Chloraseptic spray or lozenges for throat comfort    I've sent in an antibiotic to [facility] for your urinary tract infection.     Get COVID and RSV shots at the [facility] pharmacy

## 2023-11-28 NOTE — LETTER
November 28, 2023    Avril Monzon  [facility] [facility] [id number]             [facility]  [address]  [facility] [address]  Phone: [phone]  Fax: [phone] To Whom it may Concern:    Please excuse Ms. Monzon from therapy today.       If you have any questions or concerns, please don't hesitate to call.    Sincerely,        Ciarra Husain MD

## 2023-11-28 NOTE — PROGRESS NOTES
Subjective:       Patient ID: Avril Monzon is a 78 y.o. female.    Chief Complaint: Cough (Wheezing/ sore throat)    HPI:     Ms. Monzon is here with c/o URI symptoms that started Friday at HD. Symptoms are cough, wheezing, sore throat. Thinks she has a UTI due to foul odor to urine but has not been able to produce a urine sample for testing. No fever; had chills Friday. Feels more fatigued than usual, although typically feels worn out from dialysis. +nocturnal cough. No chest pain, SOB.     BP has been labile and takes metoprolol and telmisartan but has to hold on HD days due to significant drop in BP. Took both meds last night.     HM: 9/23 fluvax, 1/21 covid vaccines/booster, 1/16 [id number], 5/14 [id number], 9/22 [id number], 12/21 TDaP, 2/21 sched BMD, 2/21 MMG, 2011 Cscope outside, 3/20 Eye Dr. Coello.    Objective:     Vitals:    11/28/23 0959   BP: 125/65   Pulse: 67   Temp: 98 °F (36.7 °C)     Wt Readings from Last 3 Encounters:   11/28/23 60.4 kg (133 lb 1.6 oz)   11/22/23 61 kg (134 lb 8 oz)   11/14/23 60.9 kg (134 lb 4.2 oz)     Temp Readings from Last 3 Encounters:   11/28/23 98 °F (36.7 °C) (Oral)   11/22/23 98 °F (36.7 °C) (Oral)   11/14/23 97.8 °F (36.6 °C) (Oral)     BP Readings from Last 3 Encounters:   11/28/23 125/65   11/22/23 (!) 164/52   11/14/23 135/64     Pulse Readings from Last 3 Encounters:   11/28/23 67   11/22/23 68   11/14/23 67          Physical Exam  Vitals and nursing note reviewed.   Constitutional:       Appearance: She is well-developed.   HENT:      Head: Normocephalic and atraumatic.   Eyes:      Pupils: Pupils are equal, round, and reactive to light.   Cardiovascular:      Rate and Rhythm: Normal rate and regular rhythm.   Pulmonary:      Effort: Pulmonary effort is normal.      Breath sounds: Normal breath sounds.   Musculoskeletal:         General: No deformity. Normal range of motion.      Cervical back: Normal range of motion and neck supple.   Skin:     General: Skin is warm  and dry.   Neurological:      Mental Status: She is alert and oriented to person, place, and time.   Psychiatric:         Behavior: Behavior normal.           Albumin   Date Value Ref Range Status   11/14/2023 3.0 (L) 3.5 - 5.2 g/dL Final     eGFR   Date Value Ref Range Status   11/14/2023 10 (A) >60 mL/min/1.73 m^2 Final       Assessment:       1. Acute cough    2. Acute cystitis without hematuria        Plan:           Problem List Items Addressed This Visit    None  Visit Diagnoses       Acute cough    -  Primary    Relevant Medications    dextromethorphan-guaiFENesin (CORICIDIN HBP CHEST SHANNON-COUGH)  mg Cap    Other Relevant Orders    SARS Coronavirus 2 Antigen, POCT Manual Read    POCT Influenza A/B Rapid Antigen    Acute cystitis without hematuria        Relevant Medications    cephALEXin (KEFLEX) 500 MG capsule          Vaccines at pharmacy. Supportive care for Uri symptoms. Empiric UTI treatment with keflex. COVID and Flu NEG today

## 2023-12-05 ENCOUNTER — LAB VISIT (OUTPATIENT)
Dept: LAB | Facility: HOSPITAL | Age: 78
End: 2023-12-05
Payer: MEDICARE

## 2023-12-05 DIAGNOSIS — E78.5 HYPERLIPIDEMIA ASSOCIATED WITH TYPE 2 DIABETES MELLITUS: ICD-10-CM

## 2023-12-05 DIAGNOSIS — E11.69 HYPERLIPIDEMIA ASSOCIATED WITH TYPE 2 DIABETES MELLITUS: ICD-10-CM

## 2023-12-05 DIAGNOSIS — E11.42 DM TYPE 2 WITH DIABETIC PERIPHERAL NEUROPATHY: ICD-10-CM

## 2023-12-05 DIAGNOSIS — I50.42 CHRONIC COMBINED SYSTOLIC AND DIASTOLIC HEART FAILURE: ICD-10-CM

## 2023-12-05 DIAGNOSIS — I25.118 CORONARY ARTERY DISEASE OF NATIVE ARTERY OF NATIVE HEART WITH STABLE ANGINA PECTORIS: ICD-10-CM

## 2023-12-05 LAB
ALBUMIN SERPL BCP-MCNC: 3.6 G/DL (ref 3.5–5.2)
ALP SERPL-CCNC: 90 U/L (ref 55–135)
ALT SERPL W/O P-5'-P-CCNC: 12 U/L (ref 10–44)
ANION GAP SERPL CALC-SCNC: 15 MMOL/L (ref 8–16)
AST SERPL-CCNC: 13 U/L (ref 10–40)
BILIRUB SERPL-MCNC: 0.5 MG/DL (ref 0.1–1)
BUN SERPL-MCNC: 23 MG/DL (ref 8–23)
CALCIUM SERPL-MCNC: 9.7 MG/DL (ref 8.7–10.5)
CHLORIDE SERPL-SCNC: 103 MMOL/L (ref 95–110)
CHOLEST SERPL-MCNC: 143 MG/DL (ref 120–199)
CHOLEST/HDLC SERPL: 3.2 {RATIO} (ref 2–5)
CO2 SERPL-SCNC: 24 MMOL/L (ref 23–29)
CREAT SERPL-MCNC: 5.3 MG/DL (ref 0.5–1.4)
EST. GFR  (NO RACE VARIABLE): 7.9 ML/MIN/1.73 M^2
ESTIMATED AVG GLUCOSE: 151 MG/DL (ref 68–131)
GLUCOSE SERPL-MCNC: 178 MG/DL (ref 70–110)
HBA1C MFR BLD: 6.9 % (ref 4–5.6)
HDLC SERPL-MCNC: 45 MG/DL (ref 40–75)
HDLC SERPL: 31.5 % (ref 20–50)
LDLC SERPL CALC-MCNC: 81 MG/DL (ref 63–159)
NONHDLC SERPL-MCNC: 98 MG/DL
POTASSIUM SERPL-SCNC: 4.1 MMOL/L (ref 3.5–5.1)
PROT SERPL-MCNC: 7.3 G/DL (ref 6–8.4)
SODIUM SERPL-SCNC: 142 MMOL/L (ref 136–145)
TRIGL SERPL-MCNC: 85 MG/DL (ref 30–150)

## 2023-12-05 PROCEDURE — 80061 LIPID PANEL: CPT | Mod: HCNC

## 2023-12-05 PROCEDURE — 80053 COMPREHEN METABOLIC PANEL: CPT | Mod: HCNC

## 2023-12-05 PROCEDURE — 36415 COLL VENOUS BLD VENIPUNCTURE: CPT | Mod: HCNC

## 2023-12-05 PROCEDURE — 83036 HEMOGLOBIN GLYCOSYLATED A1C: CPT | Mod: HCNC

## 2023-12-06 ENCOUNTER — TELEPHONE (OUTPATIENT)
Dept: CARDIOLOGY | Facility: CLINIC | Age: 78
End: 2023-12-06
Payer: MEDICARE

## 2023-12-06 NOTE — TELEPHONE ENCOUNTER
LVM for pt to call back for her lab results.    Electrolytes stable, continue plan as per nephrology and dialysis. HGA1c elevated from previous, monitor sugars low carb diet, lipid panel looks good LDL improved from previous.     ----- Message from Bridget Sosa NP sent at 12/5/2023  8:45 PM CST -----  Electrolytes stable, continue plan as per nephrology and dialysis. HGA1c elevated from previous, monitor sugars low carb diet, lipid panel looks good LDL improved from previous.

## 2023-12-18 PROBLEM — I63.9 CEREBROVASCULAR ACCIDENT: Status: RESOLVED | Noted: 2023-09-14 | Resolved: 2023-12-18

## 2023-12-18 RX ORDER — TELMISARTAN 20 MG/1
20 TABLET ORAL DAILY
Qty: 90 TABLET | Refills: 3 | Status: ON HOLD | OUTPATIENT
Start: 2023-12-18 | End: 2024-03-16

## 2023-12-28 ENCOUNTER — OFFICE VISIT (OUTPATIENT)
Dept: PRIMARY CARE CLINIC | Facility: CLINIC | Age: 78
End: 2023-12-28
Payer: MEDICARE

## 2023-12-28 ENCOUNTER — CLINICAL SUPPORT (OUTPATIENT)
Dept: PRIMARY CARE CLINIC | Facility: CLINIC | Age: 78
End: 2023-12-28
Payer: MEDICARE

## 2023-12-28 VITALS
HEIGHT: 65 IN | WEIGHT: 131.94 LBS | HEART RATE: 52 BPM | DIASTOLIC BLOOD PRESSURE: 62 MMHG | SYSTOLIC BLOOD PRESSURE: 132 MMHG | BODY MASS INDEX: 21.98 KG/M2 | TEMPERATURE: 98 F | OXYGEN SATURATION: 92 %

## 2023-12-28 DIAGNOSIS — N18.6 ESRD (END STAGE RENAL DISEASE): ICD-10-CM

## 2023-12-28 DIAGNOSIS — I50.32 CHRONIC HEART FAILURE WITH PRESERVED EJECTION FRACTION: ICD-10-CM

## 2023-12-28 DIAGNOSIS — I15.2 HYPERTENSION ASSOCIATED WITH DIABETES: Primary | ICD-10-CM

## 2023-12-28 DIAGNOSIS — I27.20 PULMONARY HYPERTENSION: ICD-10-CM

## 2023-12-28 DIAGNOSIS — T82.898A OTHER SPECIFIED COMPLICATION OF VASCULAR PROSTHETIC DEVICES, IMPLANTS AND GRAFTS, INITIAL ENCOUNTER: Primary | ICD-10-CM

## 2023-12-28 DIAGNOSIS — R06.09 DOE (DYSPNEA ON EXERTION): ICD-10-CM

## 2023-12-28 DIAGNOSIS — E11.42 DM TYPE 2 WITH DIABETIC PERIPHERAL NEUROPATHY: ICD-10-CM

## 2023-12-28 DIAGNOSIS — F41.8 SITUATIONAL ANXIETY: ICD-10-CM

## 2023-12-28 DIAGNOSIS — E11.59 HYPERTENSION ASSOCIATED WITH DIABETES: Primary | ICD-10-CM

## 2023-12-28 PROCEDURE — 1126F AMNT PAIN NOTED NONE PRSNT: CPT | Mod: HCNC,CPTII,S$GLB, | Performed by: INTERNAL MEDICINE

## 2023-12-28 PROCEDURE — 3075F SYST BP GE 130 - 139MM HG: CPT | Mod: HCNC,CPTII,S$GLB, | Performed by: INTERNAL MEDICINE

## 2023-12-28 PROCEDURE — 1101F PT FALLS ASSESS-DOCD LE1/YR: CPT | Mod: HCNC,CPTII,S$GLB, | Performed by: INTERNAL MEDICINE

## 2023-12-28 PROCEDURE — 99213 PR OFFICE/OUTPT VISIT, EST, LEVL III, 20-29 MIN: ICD-10-PCS | Mod: HCNC,S$GLB,, | Performed by: INTERNAL MEDICINE

## 2023-12-28 PROCEDURE — 1157F PR ADVANCE CARE PLAN OR EQUIV PRESENT IN MEDICAL RECORD: ICD-10-PCS | Mod: HCNC,CPTII,S$GLB, | Performed by: INTERNAL MEDICINE

## 2023-12-28 PROCEDURE — 1126F PR PAIN SEVERITY QUANTIFIED, NO PAIN PRESENT: ICD-10-PCS | Mod: HCNC,CPTII,S$GLB, | Performed by: INTERNAL MEDICINE

## 2023-12-28 PROCEDURE — 99999 PR PBB SHADOW E&M-EST. PATIENT-LVL IV: ICD-10-PCS | Mod: PBBFAC,HCNC,, | Performed by: INTERNAL MEDICINE

## 2023-12-28 PROCEDURE — 3078F PR MOST RECENT DIASTOLIC BLOOD PRESSURE < 80 MM HG: ICD-10-PCS | Mod: HCNC,CPTII,S$GLB, | Performed by: INTERNAL MEDICINE

## 2023-12-28 PROCEDURE — 3078F DIAST BP <80 MM HG: CPT | Mod: HCNC,CPTII,S$GLB, | Performed by: INTERNAL MEDICINE

## 2023-12-28 PROCEDURE — 1159F PR MEDICATION LIST DOCUMENTED IN MEDICAL RECORD: ICD-10-PCS | Mod: HCNC,CPTII,S$GLB, | Performed by: INTERNAL MEDICINE

## 2023-12-28 PROCEDURE — 99999 PR PBB SHADOW E&M-EST. PATIENT-LVL IV: CPT | Mod: PBBFAC,HCNC,, | Performed by: INTERNAL MEDICINE

## 2023-12-28 PROCEDURE — 3288F FALL RISK ASSESSMENT DOCD: CPT | Mod: HCNC,CPTII,S$GLB, | Performed by: INTERNAL MEDICINE

## 2023-12-28 PROCEDURE — 1159F MED LIST DOCD IN RCRD: CPT | Mod: HCNC,CPTII,S$GLB, | Performed by: INTERNAL MEDICINE

## 2023-12-28 PROCEDURE — 1157F ADVNC CARE PLAN IN RCRD: CPT | Mod: HCNC,CPTII,S$GLB, | Performed by: INTERNAL MEDICINE

## 2023-12-28 PROCEDURE — 99213 OFFICE O/P EST LOW 20 MIN: CPT | Mod: HCNC,S$GLB,, | Performed by: INTERNAL MEDICINE

## 2023-12-28 PROCEDURE — 3075F PR MOST RECENT SYSTOLIC BLOOD PRESS GE 130-139MM HG: ICD-10-PCS | Mod: HCNC,CPTII,S$GLB, | Performed by: INTERNAL MEDICINE

## 2023-12-28 PROCEDURE — 1101F PR PT FALLS ASSESS DOC 0-1 FALLS W/OUT INJ PAST YR: ICD-10-PCS | Mod: HCNC,CPTII,S$GLB, | Performed by: INTERNAL MEDICINE

## 2023-12-28 PROCEDURE — 3288F PR FALLS RISK ASSESSMENT DOCUMENTED: ICD-10-PCS | Mod: HCNC,CPTII,S$GLB, | Performed by: INTERNAL MEDICINE

## 2023-12-28 RX ORDER — BUSPIRONE HYDROCHLORIDE 5 MG/1
5 TABLET ORAL 2 TIMES DAILY PRN
Qty: 60 TABLET | Refills: 2 | Status: ON HOLD | OUTPATIENT
Start: 2023-12-28 | End: 2024-03-16

## 2023-12-28 NOTE — PROGRESS NOTES
"Subjective:      Patient ID: Avril Monzon is a 78 y.o. female.    Chief Complaint: Follow-up (Bp f/u/ pt has SOB more than 2 weeks/ small cough/ pt wants to discuss metoprolol)      HPI  Here for follow up of medical problems.  Takes metoprolol and micardis, but holds metoprolol is pulse under 50.  BP still goes low sometimes in HD.  Pt get short of breath when walking less than a block.  Pulm says doesn't need O2.  Pt's BP was high today, but last week her BP has been good.  Pt was told her grandson  today, and that's when BP went up.  BP monitoring at home 120-140s/60s.  No cp/palp.  BMs pretty good.          HM:  fluvax,  covid vaccines/booster,  [id number],  [id number],  [id number],  TDaP,  sched BMD,  MMG,  Cscope outside, 3/20 Eye Dr. Coello.           Review of Systems   Constitutional:  Negative for chills, diaphoresis and fever.   Respiratory:  Positive for shortness of breath. Negative for cough.    Cardiovascular:  Negative for chest pain, palpitations and leg swelling.   Gastrointestinal:  Negative for blood in stool, constipation, diarrhea, nausea and vomiting.   Genitourinary:  Negative for dysuria, frequency and hematuria.   Psychiatric/Behavioral:  The patient is not nervous/anxious.          Objective:   /62   Pulse (!) 52   Temp 98 °F (36.7 °C) (Oral)   Ht 5' 5" (1.651 m)   Wt 59.8 kg (131 lb 15.1 oz)   LMP  (LMP Unknown)   SpO2 (!) 92%   BMI 21.96 kg/m²     Physical Exam  Constitutional:       Appearance: She is well-developed.   Neck:      Thyroid: No thyroid mass.      Vascular: No carotid bruit.   Cardiovascular:      Rate and Rhythm: Normal rate and regular rhythm.      Heart sounds: No murmur heard.     No friction rub. No gallop.   Pulmonary:      Effort: Pulmonary effort is normal.      Breath sounds: Normal breath sounds. No wheezing or rales.   Abdominal:      General: Bowel sounds are normal.      Palpations: Abdomen is soft. There is no " mass.      Tenderness: There is no abdominal tenderness.   Musculoskeletal:      Cervical back: Neck supple.   Lymphadenopathy:      Cervical: No cervical adenopathy.   Neurological:      Mental Status: She is alert and oriented to person, place, and time.        Latest Reference Range & Units 12/05/23 08:40   Sodium 136 - 145 mmol/L 142   Potassium 3.5 - 5.1 mmol/L 4.1   Chloride 95 - 110 mmol/L 103   CO2 23 - 29 mmol/L 24   Anion Gap 8 - 16 mmol/L 15   BUN 8 - 23 mg/dL 23   Creatinine 0.5 - 1.4 mg/dL 5.3 (H)   eGFR >60 mL/min/1.73 m^2 7.9 !   Glucose 70 - 110 mg/dL 178 (H)   Calcium 8.7 - 10.5 mg/dL 9.7   ALP 55 - 135 U/L 90   PROTEIN TOTAL 6.0 - 8.4 g/dL 7.3   Albumin 3.5 - 5.2 g/dL 3.6   BILIRUBIN TOTAL 0.1 - 1.0 mg/dL 0.5   AST 10 - 40 U/L 13   ALT 10 - 44 U/L 12   Cholesterol Total 120 - 199 mg/dL 143   HDL 40 - 75 mg/dL 45   HDL/Cholesterol Ratio 20.0 - 50.0 % 31.5   Non-HDL Cholesterol mg/dL 98   Total Cholesterol/HDL Ratio 2.0 - 5.0  3.2   Triglycerides 30 - 150 mg/dL 85   LDL Cholesterol 63.0 - 159.0 mg/dL 81.0   Hemoglobin A1C External 4.0 - 5.6 % 6.9 (H)   Estimated Avg Glucose 68 - 131 mg/dL 151 (H)         Assessment:       1. Hypertension associated with diabetes    2. ESRD (end stage renal disease)    3. DM type 2 with diabetic peripheral neuropathy    4. Chronic heart failure with preserved ejection fraction    5. Pulmonary hypertension    6. Situational anxiety    7. ALVARADO (dyspnea on exertion)          Plan:     Hypertension associated with diabetes- BP ok range at home, runs low after HD- has most dramatic dyspnea after HD.    ESRD (end stage renal disease) on HD.    DM type 2 with diabetic peripheral neuropathy- doing well on 70/30.    Chronic heart failure with preserved ejection fraction, Pulmonary hypertension, ALVARADO- to see Cards in 2wk.    Situational anxiety- try buspar prn.  -     busPIRone (BUSPAR) 5 MG Tab; Take 1 tablet (5 mg total) by mouth 2 (two) times daily as needed (anxiety).   Dispense: 60 tablet; Refill: 2    RTC 3 weeks.

## 2024-01-02 ENCOUNTER — TELEPHONE (OUTPATIENT)
Dept: CARDIOLOGY | Facility: CLINIC | Age: 79
End: 2024-01-02
Payer: MEDICARE

## 2024-01-02 NOTE — TELEPHONE ENCOUNTER
Lvm for pt to return call to clinic in regards to sooner appt       ----- Message from Jesusita Keller sent at 1/2/2024 11:08 AM CST -----  Contact: Jake/ Daughter  Pt daughter is calling in regards to getting a sooner appt other than 01/11 if available due to pt having ALVARADO.  Please call back at  [phone].      Thanks

## 2024-01-04 ENCOUNTER — OFFICE VISIT (OUTPATIENT)
Dept: PRIMARY CARE CLINIC | Facility: CLINIC | Age: 79
End: 2024-01-04
Payer: MEDICARE

## 2024-01-04 ENCOUNTER — HOSPITAL ENCOUNTER (OUTPATIENT)
Dept: RADIOLOGY | Facility: HOSPITAL | Age: 79
Discharge: HOME | End: 2024-01-04
Attending: NURSE PRACTITIONER
Payer: MEDICARE

## 2024-01-04 ENCOUNTER — TELEPHONE (OUTPATIENT)
Dept: PRIMARY CARE CLINIC | Facility: CLINIC | Age: 79
End: 2024-01-04
Payer: MEDICARE

## 2024-01-04 VITALS
WEIGHT: 131.06 LBS | HEIGHT: 65 IN | BODY MASS INDEX: 21.84 KG/M2 | DIASTOLIC BLOOD PRESSURE: 68 MMHG | OXYGEN SATURATION: 95 % | TEMPERATURE: 98 F | SYSTOLIC BLOOD PRESSURE: 150 MMHG

## 2024-01-04 DIAGNOSIS — K59.00 CONSTIPATION, UNSPECIFIED CONSTIPATION TYPE: ICD-10-CM

## 2024-01-04 DIAGNOSIS — R11.2 NAUSEA AND VOMITING, UNSPECIFIED VOMITING TYPE: ICD-10-CM

## 2024-01-04 DIAGNOSIS — R11.2 NAUSEA AND VOMITING, UNSPECIFIED VOMITING TYPE: Primary | ICD-10-CM

## 2024-01-04 PROCEDURE — 99999 PR PBB SHADOW E&M-EST. PATIENT-LVL III: CPT | Mod: PBBFAC,HCNC,, | Performed by: NURSE PRACTITIONER

## 2024-01-04 PROCEDURE — 3288F FALL RISK ASSESSMENT DOCD: CPT | Mod: HCNC,CPTII,S$GLB, | Performed by: NURSE PRACTITIONER

## 2024-01-04 PROCEDURE — 74018 RADEX ABDOMEN 1 VIEW: CPT | Mod: TC,HCNC,FY,PO

## 2024-01-04 PROCEDURE — 3078F DIAST BP <80 MM HG: CPT | Mod: HCNC,CPTII,S$GLB, | Performed by: NURSE PRACTITIONER

## 2024-01-04 PROCEDURE — 3077F SYST BP >= 140 MM HG: CPT | Mod: HCNC,CPTII,S$GLB, | Performed by: NURSE PRACTITIONER

## 2024-01-04 PROCEDURE — 1126F AMNT PAIN NOTED NONE PRSNT: CPT | Mod: HCNC,CPTII,S$GLB, | Performed by: NURSE PRACTITIONER

## 2024-01-04 PROCEDURE — 1101F PT FALLS ASSESS-DOCD LE1/YR: CPT | Mod: HCNC,CPTII,S$GLB, | Performed by: NURSE PRACTITIONER

## 2024-01-04 PROCEDURE — 99215 OFFICE O/P EST HI 40 MIN: CPT | Mod: HCNC,S$GLB,, | Performed by: NURSE PRACTITIONER

## 2024-01-04 PROCEDURE — 74018 RADEX ABDOMEN 1 VIEW: CPT | Mod: 26,HCNC,, | Performed by: RADIOLOGY

## 2024-01-04 PROCEDURE — 1157F ADVNC CARE PLAN IN RCRD: CPT | Mod: HCNC,CPTII,S$GLB, | Performed by: NURSE PRACTITIONER

## 2024-01-04 NOTE — PROGRESS NOTES
"Subjective:      Patient ID: Avril Monzon is a 78 y.o. female.    Chief Complaint: Follow-up      HPI  Here for follow up of medical problems.  BP running low on HD, and needs O2 during HD.  Dr. Holman wants her to have PT once a week, at least.  Continues with ALVARADO.  Trying to eat less sodium.  No cp/palp.  No lightheaded.  BMs good.    HM: 9/23 fluvax, 1/21 covid vaccines/booster, 1/16 [id number], 5/14 [id number], 9/22 [id number], 12/21 Tdap, 2/21 sched BMD, 2/21 MMG, 2011 Cscope outside, 7/23 Eye Dr. Coello.     Review of Systems   Constitutional:  Negative for chills, diaphoresis and fever.   Respiratory:  Negative for cough and shortness of breath.    Cardiovascular:  Negative for chest pain, palpitations and leg swelling.   Gastrointestinal:  Negative for blood in stool, constipation, diarrhea, nausea and vomiting.   Genitourinary:  Negative for dysuria, frequency and hematuria.   Psychiatric/Behavioral:  The patient is not nervous/anxious.          Objective:   BP (!) 148/60 (BP Location: Right arm, Patient Position: Sitting)   Pulse 86   Temp 97.8 °F (36.6 °C) (Oral)   Ht 5' 5" (1.651 m)   Wt 58.4 kg (128 lb 12 oz)   LMP  (LMP Unknown)   SpO2 96%   BMI 21.42 kg/m²     Physical Exam  Constitutional:       Appearance: She is well-developed.   Neck:      Thyroid: No thyroid mass.      Vascular: No carotid bruit.   Cardiovascular:      Rate and Rhythm: Normal rate and regular rhythm.      Heart sounds: No murmur heard.     No friction rub. No gallop.   Pulmonary:      Effort: Pulmonary effort is normal.      Breath sounds: Normal breath sounds. No wheezing or rales.   Abdominal:      General: Bowel sounds are normal.      Palpations: Abdomen is soft. There is no mass.      Tenderness: There is no abdominal tenderness.   Musculoskeletal:      Cervical back: Neck supple.   Lymphadenopathy:      Cervical: No cervical adenopathy.   Neurological:      Mental Status: She is alert and oriented to person, place, and " time.           Assessment:       1. Hypertension associated with diabetes    2. Type 2 diabetes mellitus with diabetic peripheral angiopathy without gangrene, with long-term current use of insulin    3. Hyperlipidemia associated with type 2 diabetes mellitus    4. ESRD (end stage renal disease)    5. Chronic heart failure with preserved ejection fraction    6. Asymptomatic postmenopausal state    7. Pulmonary hypertension          Plan:     Hypertension associated with diabetes, labile with highs and lows, cont current regimen.    Type 2 diabetes mellitus with diabetic peripheral angiopathy without gangrene, with long-term current use of insulin- doing well, on insulin.    Hyperlipidemia associated with type 2 diabetes mellitus- cont crestor.    ESRD (end stage renal disease), on HD.    Chronic heart failure with preserved ejection fraction, Pulm HTN- cont meds per Cards.  ECHO scheduled.    Asymptomatic postmenopausal state  -     DXA Bone Density Axial Skeleton 1 or more sites; Future; Expected date: 01/17/2024

## 2024-01-04 NOTE — TELEPHONE ENCOUNTER
----- Message from Nidhi Miller NP sent at 1/4/2024  2:48 PM CST -----  Please call Ms. Monzon and advise she does have a large build up of stool in her colon according to the abdominal xray.  Please take the Dulcolax and Miralax as suggested.    Thanks,  Yasmin

## 2024-01-04 NOTE — ASSESSMENT & PLAN NOTE
Start Dulcolax on day 1, then Miralax bid until stool is soft. If diarrhea, decrease Miralax to once daily    Moderate constipation per KUB

## 2024-01-04 NOTE — TELEPHONE ENCOUNTER
Called patient daughter and notified of the results and recommendations. Patient  daughter verbalized understanding, Sonya Woodruff

## 2024-01-04 NOTE — PROGRESS NOTES
"Avril Monzon  01/04/2024  [id number]    Rose Germain MD  Patient Care Team:  Rose Germain MD as PCP - General (Internal Medicine)  Conchita Garcia RD, CDE as Dietitian (Diabetes)  Debbie Wagner RD (Inactive) as Dietitian (Nutrition)  Anegla Hanson MD as Consulting Physician (Nephrology)  Demetrio Mejia MD as Consulting Physician (Cardiology)  Conchita Garcia RD, CDE as Diabetes Educator (Diabetes)  Alina Bauer DPM as Consulting Physician (Podiatry)  Anette Montanez DPM as Consulting Physician (Podiatry)  JADIEL Coello MD as Consulting Physician (Ophthalmology)      [facility]      Chief Complaint:  Chief Complaint   Patient presents with    Emesis     Pt exposed to covid  over tish       History of Present Illness:    Hypertension associated with diabetes- BP ok range at home, runs low after HD- has most dramatic dyspnea after HD.     ESRD (end stage renal disease) on HD.     DM type 2 with diabetic peripheral neuropathy- doing well on 70/30.     Chronic heart failure with preserved ejection fraction, Pulmonary hypertension, ALVARADO- to see Cards in 2wk.     Situational anxiety- try buspar prn.  -     busPIRone (BUSPAR) 5 MG Tab; Take 1 tablet (5 mg total) by mouth 2 (two) times daily as needed (anxiety).  Dispense: 60 tablet; Refill: 2     RTC 3 weeks.    Pt presents with reports of vomiting for 1 month  Vomiting once weekly x 4 weeks - contents "looks like water"  Pt takes Stanback powders for back pain  Pt notes some epigastric discomfort when she feels like she will vomit, hx of acid reflux  Hx of constipation and last BM (at least a week)  Blood sugars doing well, has not missed dialysis  SOB on AM of dialysis but improved after dialysis treatment  She still urinates and denies urinary symptoms.  Pt has a hx of DM II with last Hgb A1 c = 6.9  Denies fever/chills, diarrhea, abdominal pain and other symptoms      The following were reviewed: Active " problem list, medication list, allergies, family history, social history, and Health Maintenance.     History:  Past Medical History:   Diagnosis Date    Acute hypoxemic respiratory failure 11/26/2018    Anemia     Arthritis     back, hand, knees    Back pain     Cataract     CKD stage G4/A3, GFR 15 - 29 and albumin creatinine ratio >300 mg/g     GFR 40% Jan 2014 and 33% ub 3/2014 ([facility])    Coronary artery disease involving native coronary artery of native heart 11/30/2018    Diabetic retinopathy     DM (diabetes mellitus) type II controlled, neurological manifestation     Exudative age-related macular degeneration of left eye with active choroidal neovascularization 2/5/2019    GERD (gastroesophageal reflux disease)     Glaucoma     Heart failure     Hyperlipidemia     Hypertension     Non-ST elevation MI (NSTEMI) 11/28/2018    NSTEMI (non-ST elevated myocardial infarction) 11/27/2018    Peripheral neuropathy     Polyneuropathy     Proteinuria     >6 mo     Seizures     BRG 1/2014 -dick CT NRI showed small vessel    Skin ulcer of toe of right foot with fat layer exposed 10/14/2022    Stroke 2012,2014    Tobacco dependence     Type 2 diabetes with peripheral circulatory disorder, controlled      Past Surgical History:   Procedure Laterality Date    BREAST LUMPECTOMY Left     benign, when patient was 15 years old    BREAST MASS EXCISION      ,benign, age 15.    CATHETERIZATION OF BOTH LEFT AND RIGHT HEART N/A 11/28/2018    Procedure: CATHETERIZATION, HEART, BOTH LEFT AND RIGHT;  Surgeon: Michelle Holman MD;  Location: [facility] CATH LAB;  Service: Cardiology;  Laterality: N/A;    CATHETERIZATION OF BOTH LEFT AND RIGHT HEART N/A 11/30/2018    Procedure: CATHETERIZATION, HEART, BOTH LEFT AND RIGHT;  Surgeon: Michelle Holman MD;  Location: [facility] CATH LAB;  Service: Cardiology;  Laterality: N/A;    CORONARY ARTERY BYPASS GRAFT      CORONARY ARTERY BYPASS GRAFT (CABG) N/A 7/20/2022    Procedure: CORONARY ARTERY BYPASS GRAFT  (CABG);  Surgeon: Sanju Locke MD;  Location: [facility] OR;  Service: Cardiothoracic;  Laterality: N/A;  2-VESSEL PUMP ASSIST WITH EPI-AORTIC ULTRASOUND    ENDOSCOPIC HARVEST OF VEIN Left 7/20/2022    Procedure: SURGICAL PROCUREMENT, VEIN, ENDOSCOPIC;  Surgeon: Sanju Locke MD;  Location: [facility] OR;  Service: Cardiothoracic;  Laterality: Left;    HYSTERECTOMY  1982    INJECTION OF ANESTHETIC AGENT AROUND MULTIPLE INTERCOSTAL NERVES N/A 7/20/2022    Procedure: BLOCK, NERVE, INTERCOSTAL, 2 OR MORE;  Surgeon: Sanju Locke MD;  Location: [facility] OR;  Service: Cardiothoracic;  Laterality: N/A;  PARASTERNAL NERVE BLOCK    INSERTION OF SHUNT      LEFT HEART CATHETERIZATION Left 12/3/2018    Procedure: CATHETERIZATION, HEART, LEFT;  Surgeon: Michelle Holman MD;  Location: [facility] CATH LAB;  Service: Cardiology;  Laterality: Left;  LAD ATHERECTOMY STENT/ FEMORAL APPROACH    LEFT HEART CATHETERIZATION Left 7/13/2022    Procedure: CATHETERIZATION, HEART, LEFT;  Surgeon: Abhi Sloan MD;  Location: [facility] CATH LAB;  Service: Cardiology;  Laterality: Left;    OOPHORECTOMY      wrist cyst Right 1980s    dorsal wrist cyst     Family History   Problem Relation Age of Onset    Diabetes Mother     Hyperlipidemia Mother     Hypertension Mother     Heart disease Mother         MI    Muscular dystrophy Son     Cancer Maternal Grandmother         colon     Patient Active Problem List   Diagnosis    Constipation    Hypertension associated with diabetes    ESRD (end stage renal disease)    Proteinuria    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, with long-term current use of insulin    Cataracta brunescens of right eye    Bilateral carotid artery disease    Aortic atherosclerosis    Type 2 diabetes mellitus with circulatory disorder, with long-term current use of insulin    History of CVA (cerebrovascular accident)    Hyperparathyroidism    Vitamin D deficiency    DDD (degenerative disc disease),  lumbar    Anemia in chronic kidney disease, on chronic dialysis    Iron deficiency anemia due to chronic blood loss    Hyperlipidemia associated with type 2 diabetes mellitus    Pulmonary hypertension    CAD (coronary artery disease)    Exudative age-related macular degeneration of left eye with active choroidal neovascularization    Non-rheumatic mitral regurgitation    Non-rheumatic tricuspid valve insufficiency    Age-related nuclear cataract of left eye    DM type 2 with diabetic peripheral neuropathy    Unspecified inflammatory spondylopathy, lumbar region    Convulsions    Hemiplegia    Other specified complication of vascular prosthetic devices, implants and grafts, initial encounter    Elevated troponin    Chronic combined systolic and diastolic heart failure    Hyperkalemia    Metabolic acidosis    S/P CABG (coronary artery bypass graft)    COVID    Skin ulcer of toe of right foot with fat layer exposed    Type 2 diabetes mellitus with diabetic peripheral angiopathy without gangrene, with long-term current use of insulin    Allergic rhinitis    Other reduced mobility    Nausea & vomiting    Dysuria    Debility    Pleural effusion    Acute bilateral low back pain without sciatica    Anemia    Hypertensive disorder    Bilateral pleural effusion    Syncope    Contusion of left shoulder    Fall    Sprain of left shoulder    Diastolic dysfunction with acute on chronic heart failure    Hypoglycemia    Malodorous urine    Acute right ankle pain    Chronic heart failure with preserved ejection fraction     Review of patient's allergies indicates:   Allergen Reactions    Norvasc [amlodipine] Shortness Of Breath    Codeine Swelling    Darvon [propoxyphene] Swelling    Atorvastatin      Muscle twitching       Medications:  Current Outpatient Medications on File Prior to Visit   Medication Sig Dispense Refill    aspirin (ECOTRIN) 81 MG EC tablet Take 1 tablet (81 mg total) by mouth once daily. 90 tablet 6    atropine  1% (ISOPTO ATROPINE) 1 % Drop Place 1 drop into the left eye 3 (three) times daily.      azelastine (ASTELIN) 137 mcg (0.1 %) nasal spray 1 spray (137 mcg total) by Nasal route 2 (two) times daily. 30 mL 0    blood sugar diagnostic (TRUE METRIX GLUCOSE TEST STRIP) Strp 1 strip by Misc.(Non-Drug; Combo Route) route 3 (three) times daily. 100 each 11    busPIRone (BUSPAR) 5 MG Tab Take 1 tablet (5 mg total) by mouth 2 (two) times daily as needed (anxiety). 60 tablet 2    cyanocobalamin (VITAMIN B-12) 1000 MCG tablet Take 100 mcg by mouth once daily.      folic acid (FOLVITE) 1 MG tablet Take 1 mg by mouth once daily.      insulin aspart protamine-insulin aspart (NOVOLOG MIX 70-30FLEXPEN U-100) 100 unit/mL (70-30) InPn pen Inject 22 units into skin before breakfast, 10 units before dinner. On dialysis days, give 12 units before dinner. 15 mL 3    LIDOcaine (LIDODERM) 5 % Place 1 patch onto the skin once daily. 15 patch 0    loratadine (CLARITIN) 10 mg tablet Take 1 tablet (10 mg total) by mouth once daily. 30 tablet 3    metoprolol tartrate (LOPRESSOR) 50 MG tablet Take 1 tablet (50 mg total) by mouth 2 (two) times daily. 180 tablet 11    pantoprazole (PROTONIX) 40 MG tablet Take 1 tablet (40 mg total) by mouth once daily. 90 tablet 3    prednisoLONE acetate (PRED FORTE) 1 % DrpS Place 1 drop into both eyes 4 (four) times daily.      rosuvastatin (CRESTOR) 20 MG tablet Take 1 tablet (20 mg total) by mouth once daily. 90 tablet 3    sevelamer carbonate (RENVELA) 800 mg Tab Take 3 tablets (2,400 mg) by mouth three times a day with meals and 2 tablets (1,600 mg) by mouth with snacks 1170 tablet 3    telmisartan (MICARDIS) 20 MG Tab Take 1 tablet (20 mg total) by mouth once daily. 90 tablet 3    traMADoL (ULTRAM) 50 mg tablet Take 1 tablet (50 mg total) by mouth every 6 (six) hours as needed for Pain. 30 each 0    [DISCONTINUED] isosorbide-hydrALAZINE 20-37.5 mg (BIDIL) 20-37.5 mg Tab Take 1 tablet by mouth 3 (three) times  daily. 90 tablet 1     No current facility-administered medications on file prior to visit.       Medications have been reviewed and reconciled with patient at visit today.      Exam:  Vitals:    01/04/24 1045   BP: (!) 150/68   Temp: 97.9 °F (36.6 °C)     Weight: 59.5 kg (131 lb 1 oz)   Body mass index is 21.81 kg/m².      BP Readings from Last 3 Encounters:   01/04/24 (!) 150/68   12/28/23 132/62   11/28/23 125/65     Wt Readings from Last 3 Encounters:   01/04/24 1045 59.5 kg (131 lb 1 oz)   12/28/23 1628 59.8 kg (131 lb 15.1 oz)   11/28/23 0959 60.4 kg (133 lb 1.6 oz)        REVIEW OF SYSTEMS  Review of Systems   Constitutional:  Negative for chills, fever and malaise/fatigue.   HENT:  Negative for congestion, ear pain and sore throat.    Respiratory:  Negative for cough and shortness of breath.    Cardiovascular:  Negative for chest pain, palpitations and leg swelling.   Gastrointestinal:  Positive for nausea and vomiting. Negative for abdominal pain and diarrhea.   Genitourinary:  Negative for dysuria and frequency.   Musculoskeletal:  Negative for back pain and myalgias.   Neurological:  Negative for dizziness, focal weakness and headaches.   Psychiatric/Behavioral:  Negative for depression. The patient is not nervous/anxious.        Physical Exam  Vitals reviewed.   Constitutional:       General: She is not in acute distress.     Appearance: Normal appearance.   HENT:      Head: Normocephalic and atraumatic.   Eyes:      General: No scleral icterus.     Conjunctiva/sclera: Conjunctivae normal.   Cardiovascular:      Rate and Rhythm: Normal rate and regular rhythm.      Heart sounds: Murmur heard.   Pulmonary:      Effort: Pulmonary effort is normal. No respiratory distress.      Breath sounds: Normal breath sounds.   Abdominal:      General: There is no distension.      Palpations: Abdomen is soft. There is no mass.      Tenderness: There is no abdominal tenderness. There is no guarding or rebound.       Hernia: No hernia is present.   Musculoskeletal:         General: No swelling or deformity. Normal range of motion.      Cervical back: Normal range of motion and neck supple.      Right lower leg: No edema.      Left lower leg: No edema.   Lymphadenopathy:      Cervical: No cervical adenopathy.   Skin:     General: Skin is warm and dry.   Neurological:      Mental Status: She is alert and oriented to person, place, and time. Mental status is at baseline.   Psychiatric:         Mood and Affect: Mood normal.         Thought Content: Thought content normal.         Laboratory Reviewed:     Lab Results   Component Value Date    WBC 5.89 11/14/2023    HGB 10.2 (L) 11/14/2023    HCT 32.3 (L) 11/14/2023     11/14/2023    CHOL 143 12/05/2023    TRIG 85 12/05/2023    HDL 45 12/05/2023    ALT 12 12/05/2023    AST 13 12/05/2023     12/05/2023    K 4.1 12/05/2023     12/05/2023    CREATININE 5.3 (H) 12/05/2023    BUN 23 12/05/2023    CO2 24 12/05/2023    TSH 1.686 08/04/2023    INR 1.0 08/07/2022    HGBA1C 6.9 (H) 12/05/2023       Screening or Prevention Patient's value Goal Complete/Controlled?   HgA1C Testing and Control   Lab Results   Component Value Date    HGBA1C 6.9 (H) 12/05/2023      Annually/Less than 8% Yes   Lipid profile : 12/05/2023 Annually Yes   LDL control Lab Results   Component Value Date    LDLCALC 81.0 12/05/2023    Annually/Less than 100 mg/dl  Yes   Nephropathy screening Lab Results   Component Value Date    LABMICR 1555.0 06/13/2018     Lab Results   Component Value Date    PROTEINUA 3+ (A) 08/26/2022    Annually No   Blood pressure BP Readings from Last 1 Encounters:   01/04/24 (!) 150/68    Less than 140/90 Yes   Dilated retinal exam : 11/27/2023 Annually Yes   Foot exam   : 03/08/2022 Annually No       Health Maintenance  Health Maintenance Topics with due status: Not Due       Topic Last Completion Date    TETANUS VACCINE 12/12/2021    Eye Exam 11/27/2023    Lipid Panel 12/05/2023     Hemoglobin A1c 12/05/2023     Health Maintenance Due   Topic Date Due    Shingles Vaccine (1 of 2) Never done    DEXA Scan  Never done    RSV Vaccine (Age 60+ and Pregnant patients) (1 - 1-dose 60+ series) Never done    Foot Exam  03/08/2023    COVID-19 Vaccine (4 - 2023-24 season) 09/01/2023       Assessment and Plan:  1. Nausea and vomiting, unspecified vomiting type  Assessment & Plan:  Likely due to constipation  Treat constipation  F/U with Dr. Germain    Orders:  -     X-Ray Abdomen AP 1 View; Future; Expected date: 01/04/2024    2. Constipation, unspecified constipation type  Assessment & Plan:  Start Dulcolax on day 1, then Miralax bid until stool is soft. If diarrhea, decrease Miralax to once daily    Moderate constipation per KUB           Abdominal Xrau  FINDINGS:  Moderate stool.  Bowel gas pattern otherwise normal with no obstruction, ileus, or free air.  Bones intact.     Impression:     No acute process seen.           -Patient's lab results were reviewed and discussed with patient  -Treatment options and alternatives were discussed with the patient. Patient expressed understanding. Patient was given the opportunity to ask questions and be an active participant in their medical care. Patient had no further questions or concerns at this time.         Future Appointments   Date Time Provider Department Center   1/5/2024  3:15 PM Jaison Persaud DPM [facility] POD [facility]   1/11/2024  4:40 PM Michelle Holman MD [facility] CARDIO [facility]   1/17/2024  2:20 PM Rose Germain MD [id number]   1/30/2024  3:30 PM [facility]          After visit summary printed and given to patient upon discharge.  Patient goals and care plan are included in After visit summary.      The following issues were discussed: The primary encounter diagnosis was Nausea and vomiting, unspecified vomiting type. A diagnosis of Constipation, unspecified constipation type was also  pertinent to this visit.    Health maintenance needs, recent test results and goals of care discussed with pt and questions answered.           ADEBAYO Bagley, NP-C  [facility] [id number] [place] [id number]

## 2024-01-10 DIAGNOSIS — I25.118 CORONARY ARTERY DISEASE OF NATIVE ARTERY OF NATIVE HEART WITH STABLE ANGINA PECTORIS: Primary | ICD-10-CM

## 2024-01-11 ENCOUNTER — OFFICE VISIT (OUTPATIENT)
Dept: CARDIOLOGY | Facility: CLINIC | Age: 79
End: 2024-01-11
Payer: MEDICARE

## 2024-01-11 ENCOUNTER — HOSPITAL ENCOUNTER (OUTPATIENT)
Dept: CARDIOLOGY | Facility: HOSPITAL | Age: 79
Discharge: HOME | End: 2024-01-11
Attending: INTERNAL MEDICINE
Payer: MEDICARE

## 2024-01-11 VITALS
HEART RATE: 72 BPM | BODY MASS INDEX: 21.81 KG/M2 | DIASTOLIC BLOOD PRESSURE: 56 MMHG | OXYGEN SATURATION: 95 % | WEIGHT: 130.94 LBS | SYSTOLIC BLOOD PRESSURE: 114 MMHG | HEIGHT: 65 IN

## 2024-01-11 DIAGNOSIS — I25.118 CORONARY ARTERY DISEASE OF NATIVE ARTERY OF NATIVE HEART WITH STABLE ANGINA PECTORIS: ICD-10-CM

## 2024-01-11 DIAGNOSIS — D63.1 ANEMIA IN CHRONIC KIDNEY DISEASE, ON CHRONIC DIALYSIS: ICD-10-CM

## 2024-01-11 DIAGNOSIS — Z79.4 TYPE 2 DIABETES MELLITUS WITH DIABETIC PERIPHERAL ANGIOPATHY WITHOUT GANGRENE, WITH LONG-TERM CURRENT USE OF INSULIN: ICD-10-CM

## 2024-01-11 DIAGNOSIS — I36.1 NON-RHEUMATIC TRICUSPID VALVE INSUFFICIENCY: ICD-10-CM

## 2024-01-11 DIAGNOSIS — E11.3553 TYPE 2 DIABETES MELLITUS WITH STABLE PROLIFERATIVE RETINOPATHY OF BOTH EYES, WITH LONG-TERM CURRENT USE OF INSULIN: ICD-10-CM

## 2024-01-11 DIAGNOSIS — Z99.2 ANEMIA IN CHRONIC KIDNEY DISEASE, ON CHRONIC DIALYSIS: ICD-10-CM

## 2024-01-11 DIAGNOSIS — I34.0 NON-RHEUMATIC MITRAL REGURGITATION: ICD-10-CM

## 2024-01-11 DIAGNOSIS — I50.32 CHRONIC HEART FAILURE WITH PRESERVED EJECTION FRACTION: ICD-10-CM

## 2024-01-11 DIAGNOSIS — I15.2 HYPERTENSION ASSOCIATED WITH DIABETES: ICD-10-CM

## 2024-01-11 DIAGNOSIS — E55.9 VITAMIN D DEFICIENCY: ICD-10-CM

## 2024-01-11 DIAGNOSIS — E11.69 HYPERLIPIDEMIA ASSOCIATED WITH TYPE 2 DIABETES MELLITUS: ICD-10-CM

## 2024-01-11 DIAGNOSIS — E11.51 TYPE 2 DIABETES MELLITUS WITH DIABETIC PERIPHERAL ANGIOPATHY WITHOUT GANGRENE, WITH LONG-TERM CURRENT USE OF INSULIN: ICD-10-CM

## 2024-01-11 DIAGNOSIS — L97.512 SKIN ULCER OF TOE OF RIGHT FOOT WITH FAT LAYER EXPOSED: ICD-10-CM

## 2024-01-11 DIAGNOSIS — N18.6 ANEMIA IN CHRONIC KIDNEY DISEASE, ON CHRONIC DIALYSIS: ICD-10-CM

## 2024-01-11 DIAGNOSIS — I27.20 PULMONARY HYPERTENSION: ICD-10-CM

## 2024-01-11 DIAGNOSIS — I65.23 BILATERAL CAROTID ARTERY STENOSIS: ICD-10-CM

## 2024-01-11 DIAGNOSIS — I50.33 DIASTOLIC DYSFUNCTION WITH ACUTE ON CHRONIC HEART FAILURE: ICD-10-CM

## 2024-01-11 DIAGNOSIS — I50.42 CHRONIC COMBINED SYSTOLIC AND DIASTOLIC HEART FAILURE: ICD-10-CM

## 2024-01-11 DIAGNOSIS — I70.0 AORTIC ATHEROSCLEROSIS: ICD-10-CM

## 2024-01-11 DIAGNOSIS — Z95.1 S/P CABG (CORONARY ARTERY BYPASS GRAFT): ICD-10-CM

## 2024-01-11 DIAGNOSIS — Z86.73 HISTORY OF CVA (CEREBROVASCULAR ACCIDENT): ICD-10-CM

## 2024-01-11 DIAGNOSIS — Z79.4 TYPE 2 DIABETES MELLITUS WITH STABLE PROLIFERATIVE RETINOPATHY OF BOTH EYES, WITH LONG-TERM CURRENT USE OF INSULIN: ICD-10-CM

## 2024-01-11 DIAGNOSIS — E11.59 HYPERTENSION ASSOCIATED WITH DIABETES: ICD-10-CM

## 2024-01-11 DIAGNOSIS — R80.9 PROTEINURIA, UNSPECIFIED TYPE: ICD-10-CM

## 2024-01-11 DIAGNOSIS — E78.5 HYPERLIPIDEMIA ASSOCIATED WITH TYPE 2 DIABETES MELLITUS: ICD-10-CM

## 2024-01-11 DIAGNOSIS — I25.118 CORONARY ARTERY DISEASE OF NATIVE ARTERY OF NATIVE HEART WITH STABLE ANGINA PECTORIS: Primary | ICD-10-CM

## 2024-01-11 PROCEDURE — 1126F AMNT PAIN NOTED NONE PRSNT: CPT | Mod: HCNC,CPTII,S$GLB, | Performed by: INTERNAL MEDICINE

## 2024-01-11 PROCEDURE — 93005 ELECTROCARDIOGRAM TRACING: CPT | Mod: HCNC

## 2024-01-11 PROCEDURE — 93010 ELECTROCARDIOGRAM REPORT: CPT | Mod: HCNC,,, | Performed by: STUDENT IN AN ORGANIZED HEALTH CARE EDUCATION/TRAINING PROGRAM

## 2024-01-11 PROCEDURE — 3078F DIAST BP <80 MM HG: CPT | Mod: HCNC,CPTII,S$GLB, | Performed by: INTERNAL MEDICINE

## 2024-01-11 PROCEDURE — 1100F PTFALLS ASSESS-DOCD GE2>/YR: CPT | Mod: HCNC,CPTII,S$GLB, | Performed by: INTERNAL MEDICINE

## 2024-01-11 PROCEDURE — 3074F SYST BP LT 130 MM HG: CPT | Mod: HCNC,CPTII,S$GLB, | Performed by: INTERNAL MEDICINE

## 2024-01-11 PROCEDURE — 1157F ADVNC CARE PLAN IN RCRD: CPT | Mod: HCNC,CPTII,S$GLB, | Performed by: INTERNAL MEDICINE

## 2024-01-11 PROCEDURE — 1160F RVW MEDS BY RX/DR IN RCRD: CPT | Mod: HCNC,CPTII,S$GLB, | Performed by: INTERNAL MEDICINE

## 2024-01-11 PROCEDURE — 1159F MED LIST DOCD IN RCRD: CPT | Mod: HCNC,CPTII,S$GLB, | Performed by: INTERNAL MEDICINE

## 2024-01-11 PROCEDURE — 3288F FALL RISK ASSESSMENT DOCD: CPT | Mod: HCNC,CPTII,S$GLB, | Performed by: INTERNAL MEDICINE

## 2024-01-11 PROCEDURE — 99214 OFFICE O/P EST MOD 30 MIN: CPT | Mod: HCNC,S$GLB,, | Performed by: INTERNAL MEDICINE

## 2024-01-11 PROCEDURE — 99999 PR PBB SHADOW E&M-EST. PATIENT-LVL IV: CPT | Mod: PBBFAC,HCNC,, | Performed by: INTERNAL MEDICINE

## 2024-01-11 NOTE — PROGRESS NOTES
Subjective:   Patient ID:  Avril Monzon is a 78 y.o. female who presents for follow up of No chief complaint on file.      HPI  10/14/2022  A 76 yo female well known to me from previous encounter with cad s/p intervention nstemi esrd on hd underwent cabg subsequently had covid and  pulmonary edema. Has recovered well she is diabetic went to see podiatry had rt toe ulcer as well as heel ulcers bilaterally being managed with betadine application no discharge no pain      1/19/2023   Here frof /u has been doing well clinically has no chest pain gets a little hypotension on dialysis at the end her dry weight is getting adjusted compliant with meds has knots on leg told she has thrombosed varicosities still has  ulcer on big toe follows with podiatry. No fever chills syncope near syncope chf symptoms or angina. She is not taking her statins.        8/17/23  Here today for CV follow up. Pt was admitted recently after missing dialysis. Slight bump in troponin likely demand, here today and denies any CP, angina or anginal equivalent at this time. Reports she feels her best today since being DC    1/11/2024  Cabg in 7/22 [place] and svg to om  She is short of breath since September. Had a fall she needs oxygen on dialysis has been eating potato chips. Her sodium intake is increased. Has been getting dialysis. Regularily she has lost weight she is not eating a lot though . Has hypotension on dialysis   She has moderate mr and pulmonary htn as rv dysfunction.  Past Medical History:   Diagnosis Date    Acute hypoxemic respiratory failure 11/26/2018    Anemia     Arthritis     back, hand, knees    Back pain     Cataract     CKD stage G4/A3, GFR 15 - 29 and albumin creatinine ratio >300 mg/g     GFR 40% Jan 2014 and 33% ub 3/2014 ([facility])    Coronary artery disease involving native coronary artery of native heart 11/30/2018    Diabetic retinopathy     DM (diabetes mellitus) type II controlled, neurological manifestation      Exudative age-related macular degeneration of left eye with active choroidal neovascularization 2/5/2019    GERD (gastroesophageal reflux disease)     Glaucoma     Heart failure     Hyperlipidemia     Hypertension     Non-ST elevation MI (NSTEMI) 11/28/2018    NSTEMI (non-ST elevated myocardial infarction) 11/27/2018    Peripheral neuropathy     Polyneuropathy     Proteinuria     >6 mo     Seizures     BRG 1/2014 -dick CT NRI showed small vessel    Skin ulcer of toe of right foot with fat layer exposed 10/14/2022    Stroke 2012,2014    Tobacco dependence     Type 2 diabetes with peripheral circulatory disorder, controlled        Past Surgical History:   Procedure Laterality Date    BREAST LUMPECTOMY Left     benign, when patient was 15 years old    BREAST MASS EXCISION      ,benign, age 15.    CATHETERIZATION OF BOTH LEFT AND RIGHT HEART N/A 11/28/2018    Procedure: CATHETERIZATION, HEART, BOTH LEFT AND RIGHT;  Surgeon: iMchelle Holman MD;  Location: [facility] CATH LAB;  Service: Cardiology;  Laterality: N/A;    CATHETERIZATION OF BOTH LEFT AND RIGHT HEART N/A 11/30/2018    Procedure: CATHETERIZATION, HEART, BOTH LEFT AND RIGHT;  Surgeon: Michelle Holman MD;  Location: [facility] CATH LAB;  Service: Cardiology;  Laterality: N/A;    CORONARY ARTERY BYPASS GRAFT      CORONARY ARTERY BYPASS GRAFT (CABG) N/A 7/20/2022    Procedure: CORONARY ARTERY BYPASS GRAFT (CABG);  Surgeon: Sanju Locke MD;  Location: [facility] OR;  Service: Cardiothoracic;  Laterality: N/A;  2-VESSEL PUMP ASSIST WITH EPI-AORTIC ULTRASOUND    ENDOSCOPIC HARVEST OF VEIN Left 7/20/2022    Procedure: SURGICAL PROCUREMENT, VEIN, ENDOSCOPIC;  Surgeon: Sanju Locke MD;  Location: [facility] OR;  Service: Cardiothoracic;  Laterality: Left;    HYSTERECTOMY  1982    INJECTION OF ANESTHETIC AGENT AROUND MULTIPLE INTERCOSTAL NERVES N/A 7/20/2022    Procedure: BLOCK, NERVE, INTERCOSTAL, 2 OR MORE;  Surgeon: Sanju Locke MD;  Location: [facility] OR;  Service: Cardiothoracic;   Laterality: N/A;  PARASTERNAL NERVE BLOCK    INSERTION OF SHUNT      LEFT HEART CATHETERIZATION Left 12/3/2018    Procedure: CATHETERIZATION, HEART, LEFT;  Surgeon: Michelle Homlan MD;  Location: [facility] CATH LAB;  Service: Cardiology;  Laterality: Left;  LAD ATHERECTOMY STENT/ FEMORAL APPROACH    LEFT HEART CATHETERIZATION Left 2022    Procedure: CATHETERIZATION, HEART, LEFT;  Surgeon: Abhi Sloan MD;  Location: [facility] CATH LAB;  Service: Cardiology;  Laterality: Left;    OOPHORECTOMY      wrist cyst Right 1980s    dorsal wrist cyst       Social History     Tobacco Use    Smoking status: Former     Current packs/day: 0.00     Average packs/day: 1.5 packs/day for 30.0 years (45.0 ttl pk-yrs)     Types: Cigarettes     Start date: 1960     Quit date: 1990     Years since quittin.0    Smokeless tobacco: Former   Substance Use Topics    Alcohol use: No     Alcohol/week: 0.0 standard drinks of alcohol    Drug use: No       Family History   Problem Relation Age of Onset    Diabetes Mother     Hyperlipidemia Mother     Hypertension Mother     Heart disease Mother         MI    Muscular dystrophy Son     Cancer Maternal Grandmother         colon       Current Outpatient Medications   Medication Sig    aspirin (ECOTRIN) 81 MG EC tablet Take 1 tablet (81 mg total) by mouth once daily.    atropine 1% (ISOPTO ATROPINE) 1 % Drop Place 1 drop into the left eye 3 (three) times daily.    azelastine (ASTELIN) 137 mcg (0.1 %) nasal spray 1 spray (137 mcg total) by Nasal route 2 (two) times daily.    busPIRone (BUSPAR) 5 MG Tab Take 1 tablet (5 mg total) by mouth 2 (two) times daily as needed (anxiety).    cyanocobalamin (VITAMIN B-12) 1000 MCG tablet Take 100 mcg by mouth once daily.    folic acid (FOLVITE) 1 MG tablet Take 1 mg by mouth once daily.    insulin aspart protamine-insulin aspart (NOVOLOG MIX 70-30FLEXPEN U-100) 100 unit/mL (70-30) InPn pen Inject 22 units into skin before breakfast, 10 units before  dinner. On dialysis days, give 12 units before dinner.    LIDOcaine (LIDODERM) 5 % Place 1 patch onto the skin once daily.    loratadine (CLARITIN) 10 mg tablet Take 1 tablet (10 mg total) by mouth once daily.    metoprolol tartrate (LOPRESSOR) 50 MG tablet Take 1 tablet (50 mg total) by mouth 2 (two) times daily.    pantoprazole (PROTONIX) 40 MG tablet Take 1 tablet (40 mg total) by mouth once daily.    prednisoLONE acetate (PRED FORTE) 1 % DrpS Place 1 drop into both eyes 4 (four) times daily.    rosuvastatin (CRESTOR) 20 MG tablet Take 1 tablet (20 mg total) by mouth once daily.    sevelamer carbonate (RENVELA) 800 mg Tab Take 3 tablets (2,400 mg) by mouth three times a day with meals and 2 tablets (1,600 mg) by mouth with snacks    telmisartan (MICARDIS) 20 MG Tab Take 1 tablet (20 mg total) by mouth once daily.    traMADoL (ULTRAM) 50 mg tablet Take 1 tablet (50 mg total) by mouth every 6 (six) hours as needed for Pain.    blood sugar diagnostic (TRUE METRIX GLUCOSE TEST STRIP) Strp 1 strip by Misc.(Non-Drug; Combo Route) route 3 (three) times daily.     No current facility-administered medications for this visit.     Current Outpatient Medications on File Prior to Visit   Medication Sig    aspirin (ECOTRIN) 81 MG EC tablet Take 1 tablet (81 mg total) by mouth once daily.    atropine 1% (ISOPTO ATROPINE) 1 % Drop Place 1 drop into the left eye 3 (three) times daily.    azelastine (ASTELIN) 137 mcg (0.1 %) nasal spray 1 spray (137 mcg total) by Nasal route 2 (two) times daily.    busPIRone (BUSPAR) 5 MG Tab Take 1 tablet (5 mg total) by mouth 2 (two) times daily as needed (anxiety).    cyanocobalamin (VITAMIN B-12) 1000 MCG tablet Take 100 mcg by mouth once daily.    folic acid (FOLVITE) 1 MG tablet Take 1 mg by mouth once daily.    insulin aspart protamine-insulin aspart (NOVOLOG MIX 70-30FLEXPEN U-100) 100 unit/mL (70-30) InPn pen Inject 22 units into skin before breakfast, 10 units before dinner. On dialysis  days, give 12 units before dinner.    LIDOcaine (LIDODERM) 5 % Place 1 patch onto the skin once daily.    loratadine (CLARITIN) 10 mg tablet Take 1 tablet (10 mg total) by mouth once daily.    metoprolol tartrate (LOPRESSOR) 50 MG tablet Take 1 tablet (50 mg total) by mouth 2 (two) times daily.    pantoprazole (PROTONIX) 40 MG tablet Take 1 tablet (40 mg total) by mouth once daily.    prednisoLONE acetate (PRED FORTE) 1 % DrpS Place 1 drop into both eyes 4 (four) times daily.    rosuvastatin (CRESTOR) 20 MG tablet Take 1 tablet (20 mg total) by mouth once daily.    sevelamer carbonate (RENVELA) 800 mg Tab Take 3 tablets (2,400 mg) by mouth three times a day with meals and 2 tablets (1,600 mg) by mouth with snacks    telmisartan (MICARDIS) 20 MG Tab Take 1 tablet (20 mg total) by mouth once daily.    traMADoL (ULTRAM) 50 mg tablet Take 1 tablet (50 mg total) by mouth every 6 (six) hours as needed for Pain.    blood sugar diagnostic (TRUE METRIX GLUCOSE TEST STRIP) Strp 1 strip by Misc.(Non-Drug; Combo Route) route 3 (three) times daily.    [DISCONTINUED] isosorbide-hydrALAZINE 20-37.5 mg (BIDIL) 20-37.5 mg Tab Take 1 tablet by mouth 3 (three) times daily.     No current facility-administered medications on file prior to visit.     Review of patient's allergies indicates:   Allergen Reactions    Norvasc [amlodipine] Shortness Of Breath    Codeine Swelling    Darvon [propoxyphene] Swelling    Atorvastatin      Muscle twitching      Review of Systems   Constitutional: Negative for diaphoresis, malaise/fatigue and weight gain.   HENT:  Negative for hoarse voice.    Eyes:  Negative for double vision and visual disturbance.   Cardiovascular:  Negative for chest pain, claudication, cyanosis, dyspnea on exertion, irregular heartbeat, leg swelling, near-syncope, orthopnea, palpitations, paroxysmal nocturnal dyspnea and syncope.   Respiratory:  Negative for cough, hemoptysis, shortness of breath and snoring.     Hematologic/Lymphatic: Negative for bleeding problem. Does not bruise/bleed easily.   Skin:  Negative for color change and poor wound healing.   Musculoskeletal:  Negative for muscle cramps, muscle weakness and myalgias.   Gastrointestinal:  Negative for bloating, abdominal pain, change in bowel habit, diarrhea, heartburn, hematemesis, hematochezia, melena and nausea.   Neurological:  Negative for excessive daytime sleepiness, dizziness, headaches, light-headedness, loss of balance, numbness and weakness.   Psychiatric/Behavioral:  Negative for memory loss. The patient does not have insomnia.    Allergic/Immunologic: Negative for hives.       Objective:   Physical Exam  Constitutional:       General: She is not in acute distress.     Appearance: Normal appearance. She is well-developed. She is not ill-appearing.   HENT:      Head: Normocephalic and atraumatic.   Eyes:      General: No scleral icterus.     Pupils: Pupils are equal, round, and reactive to light.   Neck:      Thyroid: No thyromegaly.      Vascular: Normal carotid pulses. No carotid bruit, hepatojugular reflux or JVD.      Trachea: No tracheal deviation.   Cardiovascular:      Rate and Rhythm: Normal rate and regular rhythm.      Pulses:           Carotid pulses are 2+ on the right side and 2+ on the left side.       Femoral pulses are 2+ on the right side and 2+ on the left side.       Popliteal pulses are 2+ on the right side and 2+ on the left side.        Dorsalis pedis pulses are 1+ on the right side and 1+ on the left side.        Posterior tibial pulses are 1+ on the right side and 1+ on the left side.      Heart sounds: Murmur heard.      High-pitched blowing holosystolic murmur is present with a grade of 2/6 at the apex.      No friction rub. No gallop.      Comments: Patent left arm shunt  Pulmonary:      Effort: Pulmonary effort is normal. No respiratory distress.      Breath sounds: Normal breath sounds. No wheezing, rhonchi or rales.       "Comments: Scar cabg well healed.  Chest:      Chest wall: No tenderness.   Abdominal:      General: Bowel sounds are normal. There is no abdominal bruit.      Palpations: Abdomen is soft. There is no hepatomegaly or pulsatile mass.      Tenderness: There is no abdominal tenderness.   Musculoskeletal:      Right shoulder: No deformity.      Cervical back: Normal range of motion and neck supple.      Right lower leg: No edema.      Left lower leg: No edema.   Skin:     General: Skin is warm and dry.      Findings: No erythema or rash.      Nails: There is no clubbing.   Neurological:      General: No focal deficit present.      Mental Status: She is alert and oriented to person, place, and time.      Cranial Nerves: No cranial nerve deficit.      Coordination: Coordination normal.   Psychiatric:         Mood and Affect: Mood normal.         Speech: Speech normal.         Behavior: Behavior normal.       Vitals:    01/11/24 1639   BP: (!) 114/56   BP Location: Right arm   Patient Position: Sitting   BP Method: Medium (Manual)   Pulse: 72   SpO2: 95%   Weight: 59.4 kg (130 lb 15.3 oz)   Height: 5' 5" (1.651 m)     Lab Results   Component Value Date    CHOL 143 12/05/2023    CHOL 171 07/13/2022    CHOL 246 (H) 10/19/2021      Body mass index is 21.79 kg/m².   Lab Results   Component Value Date    HGBA1C 6.9 (H) 12/05/2023      BMP  Lab Results   Component Value Date     12/05/2023    K 4.1 12/05/2023     12/05/2023    CO2 24 12/05/2023    BUN 23 12/05/2023    CREATININE 5.3 (H) 12/05/2023    CALCIUM 9.7 12/05/2023    ANIONGAP 15 12/05/2023    EGFRNORACEVR 7.9 (A) 12/05/2023      Lab Results   Component Value Date    HDL 45 12/05/2023    HDL 59 07/13/2022    HDL 62 10/19/2021     Lab Results   Component Value Date    LDLCALC 81.0 12/05/2023    LDLCALC 102.8 07/13/2022    LDLCALC 159.6 (H) 10/19/2021     Lab Results   Component Value Date    TRIG 85 12/05/2023    TRIG 46 07/13/2022    TRIG 122 10/19/2021 "     Lab Results   Component Value Date    CHOLHDL 31.5 12/05/2023    CHOLHDL 34.5 07/13/2022    CHOLHDL 25.2 10/19/2021       Chemistry        Component Value Date/Time     12/05/2023 0840    K 4.1 12/05/2023 0840     12/05/2023 0840    CO2 24 12/05/2023 0840    BUN 23 12/05/2023 0840    CREATININE 5.3 (H) 12/05/2023 0840     (H) 12/05/2023 0840        Component Value Date/Time    CALCIUM 9.7 12/05/2023 0840    ALKPHOS 90 12/05/2023 0840    AST 13 12/05/2023 0840    ALT 12 12/05/2023 0840    BILITOT 0.5 12/05/2023 0840    ESTGFRAFRICA 8 (A) 07/27/2022 0433    ESTGFRAFRICA 8 (A) 07/27/2022 0433    EGFRNONAA 7 (A) 07/27/2022 0433    EGFRNONAA 7 (A) 07/27/2022 0433          Lab Results   Component Value Date    TSH 1.686 08/04/2023     Lab Results   Component Value Date    INR 1.0 08/07/2022    INR 1.3 (H) 07/21/2022    INR 1.4 (H) 07/20/2022     Lab Results   Component Value Date    WBC 5.89 11/14/2023    HGB 10.2 (L) 11/14/2023    HCT 32.3 (L) 11/14/2023    MCV 99 (H) 11/14/2023     11/14/2023     BMP  Sodium   Date Value Ref Range Status   12/05/2023 142 136 - 145 mmol/L Final     Potassium   Date Value Ref Range Status   12/05/2023 4.1 3.5 - 5.1 mmol/L Final     Chloride   Date Value Ref Range Status   12/05/2023 103 95 - 110 mmol/L Final     CO2   Date Value Ref Range Status   12/05/2023 24 23 - 29 mmol/L Final     BUN   Date Value Ref Range Status   12/05/2023 23 8 - 23 mg/dL Final     Creatinine   Date Value Ref Range Status   12/05/2023 5.3 (H) 0.5 - 1.4 mg/dL Final     Calcium   Date Value Ref Range Status   12/05/2023 9.7 8.7 - 10.5 mg/dL Final     Anion Gap   Date Value Ref Range Status   12/05/2023 15 8 - 16 mmol/L Final     eGFR if    Date Value Ref Range Status   07/27/2022 8 (A) >60 mL/min/1.73 m^2 Final   07/27/2022 8 (A) >60 mL/min/1.73 m^2 Final     eGFR if non    Date Value Ref Range Status   07/27/2022 7 (A) >60 mL/min/1.73 m^2 Final      Comment:     Calculation used to obtain the estimated glomerular filtration  rate (eGFR) is the CKD-EPI equation.      07/27/2022 7 (A) >60 mL/min/1.73 m^2 Final     Comment:     Calculation used to obtain the estimated glomerular filtration  rate (eGFR) is the CKD-EPI equation.        CrCl cannot be calculated (Patient's most recent lab result is older than the maximum 7 days allowed.).    Assessment:     1. Coronary artery disease of native artery of native heart with stable angina pectoris    2. Hypertension associated with diabetes    3. 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, with long-term current use of insulin    4. Proteinuria, unspecified type    5. Aortic atherosclerosis    6. Bilateral carotid artery stenosis    7. History of CVA (cerebrovascular accident)    8. Vitamin D deficiency    9. Anemia in chronic kidney disease, on chronic dialysis    10. Hyperlipidemia associated with type 2 diabetes mellitus    11. Pulmonary hypertension    12. Non-rheumatic mitral regurgitation    13. Non-rheumatic tricuspid valve insufficiency    14. Chronic combined systolic and diastolic heart failure    15. S/P CABG (coronary artery bypass graft)    16. Skin ulcer of toe of right foot with fat layer exposed    17. Type 2 diabetes mellitus with diabetic peripheral angiopathy without gangrene, with long-term current use of insulin    18. Diastolic dysfunction with acute on chronic heart failure    19. Chronic heart failure with preserved ejection fraction      Cad s/p cabg x2 vessel with continued shortness of breath with exertion will reassess lvf with echo. And r/o ischemia with cardiolite  Esrd on hd needs to make sure she is getting enough volume removed to help assist in her shortness of breath  Htn controlled discussed low salt diet and volume intake of fluids.   Diabetes controlled A1c on target continue same    Hlp on statins improved remarkably her ldl is near target has come a  long way with control continue the same.   Pulmonary htn will repeat echo in light of her symptoms  Mitral regurgitation will repeat echo to assess and see if any worsening happened to explain shortness of breath.  Plan:   Echo cardiolite   Phone review and decide f/u

## 2024-01-17 ENCOUNTER — OFFICE VISIT (OUTPATIENT)
Dept: PRIMARY CARE CLINIC | Facility: CLINIC | Age: 79
End: 2024-01-17
Payer: MEDICARE

## 2024-01-17 VITALS
BODY MASS INDEX: 21.45 KG/M2 | HEART RATE: 86 BPM | HEIGHT: 65 IN | WEIGHT: 128.75 LBS | TEMPERATURE: 98 F | DIASTOLIC BLOOD PRESSURE: 60 MMHG | OXYGEN SATURATION: 96 % | SYSTOLIC BLOOD PRESSURE: 148 MMHG

## 2024-01-17 DIAGNOSIS — I27.20 PULMONARY HYPERTENSION: ICD-10-CM

## 2024-01-17 DIAGNOSIS — E11.69 HYPERLIPIDEMIA ASSOCIATED WITH TYPE 2 DIABETES MELLITUS: ICD-10-CM

## 2024-01-17 DIAGNOSIS — E11.51 TYPE 2 DIABETES MELLITUS WITH DIABETIC PERIPHERAL ANGIOPATHY WITHOUT GANGRENE, WITH LONG-TERM CURRENT USE OF INSULIN: ICD-10-CM

## 2024-01-17 DIAGNOSIS — I50.32 CHRONIC HEART FAILURE WITH PRESERVED EJECTION FRACTION: ICD-10-CM

## 2024-01-17 DIAGNOSIS — E78.5 HYPERLIPIDEMIA ASSOCIATED WITH TYPE 2 DIABETES MELLITUS: ICD-10-CM

## 2024-01-17 DIAGNOSIS — N18.6 ESRD (END STAGE RENAL DISEASE): ICD-10-CM

## 2024-01-17 DIAGNOSIS — E11.59 HYPERTENSION ASSOCIATED WITH DIABETES: Primary | ICD-10-CM

## 2024-01-17 DIAGNOSIS — Z79.4 TYPE 2 DIABETES MELLITUS WITH DIABETIC PERIPHERAL ANGIOPATHY WITHOUT GANGRENE, WITH LONG-TERM CURRENT USE OF INSULIN: ICD-10-CM

## 2024-01-17 DIAGNOSIS — Z78.0 ASYMPTOMATIC POSTMENOPAUSAL STATE: ICD-10-CM

## 2024-01-17 DIAGNOSIS — I15.2 HYPERTENSION ASSOCIATED WITH DIABETES: Primary | ICD-10-CM

## 2024-01-17 PROCEDURE — 3077F SYST BP >= 140 MM HG: CPT | Mod: HCNC,CPTII,S$GLB, | Performed by: INTERNAL MEDICINE

## 2024-01-17 PROCEDURE — 3078F DIAST BP <80 MM HG: CPT | Mod: HCNC,CPTII,S$GLB, | Performed by: INTERNAL MEDICINE

## 2024-01-17 PROCEDURE — 1159F MED LIST DOCD IN RCRD: CPT | Mod: HCNC,CPTII,S$GLB, | Performed by: INTERNAL MEDICINE

## 2024-01-17 PROCEDURE — 99999 PR PBB SHADOW E&M-EST. PATIENT-LVL IV: CPT | Mod: PBBFAC,HCNC,, | Performed by: INTERNAL MEDICINE

## 2024-01-17 PROCEDURE — 1157F ADVNC CARE PLAN IN RCRD: CPT | Mod: HCNC,CPTII,S$GLB, | Performed by: INTERNAL MEDICINE

## 2024-01-17 PROCEDURE — 1126F AMNT PAIN NOTED NONE PRSNT: CPT | Mod: HCNC,CPTII,S$GLB, | Performed by: INTERNAL MEDICINE

## 2024-01-17 PROCEDURE — 99213 OFFICE O/P EST LOW 20 MIN: CPT | Mod: HCNC,S$GLB,, | Performed by: INTERNAL MEDICINE

## 2024-01-17 NOTE — LETTER
January 17, 2024    Avril Monzon  [facility] [facility] [address]             [facility] [place]  [address]  [facility] [address]  Phone: [phone]  Fax: [phone] To whom it may concern:    Please allow Ms. Monzon to return to PT and working on reconditioning.    Sincerely,       Rose Germain MD

## 2024-01-18 DIAGNOSIS — I50.42 CHRONIC COMBINED SYSTOLIC AND DIASTOLIC HEART FAILURE: ICD-10-CM

## 2024-01-18 DIAGNOSIS — R06.02 SHORTNESS OF BREATH: ICD-10-CM

## 2024-01-18 DIAGNOSIS — I50.33 DIASTOLIC DYSFUNCTION WITH ACUTE ON CHRONIC HEART FAILURE: ICD-10-CM

## 2024-01-18 DIAGNOSIS — I50.32 CHRONIC HEART FAILURE WITH PRESERVED EJECTION FRACTION: ICD-10-CM

## 2024-01-18 DIAGNOSIS — I27.20 PULMONARY HYPERTENSION: Primary | ICD-10-CM

## 2024-01-23 ENCOUNTER — TELEPHONE (OUTPATIENT)
Dept: CARDIOLOGY | Facility: CLINIC | Age: 79
End: 2024-01-23
Payer: MEDICARE

## 2024-01-23 NOTE — TELEPHONE ENCOUNTER
Per patients daughter they wanted to make sure the stress test and her other appointment did not overlap----- Message from Zenaida Beard sent at 1/23/2024  2:43 PM CST -----  Contact: Avril Childers is calling in regards to all appts scheduled for Tuesday 1/30/24. Pt has some questions regarding being short winded. Pt can be reached at [phone].      Thanks  KENDRICK

## 2024-01-30 ENCOUNTER — HOSPITAL ENCOUNTER (OUTPATIENT)
Dept: RADIOLOGY | Facility: HOSPITAL | Age: 79
Discharge: HOME | End: 2024-01-30
Attending: INTERNAL MEDICINE
Payer: MEDICARE

## 2024-01-30 ENCOUNTER — HOSPITAL ENCOUNTER (OUTPATIENT)
Dept: CARDIOLOGY | Facility: HOSPITAL | Age: 79
Discharge: HOME | End: 2024-01-30
Attending: INTERNAL MEDICINE
Payer: MEDICARE

## 2024-01-30 ENCOUNTER — OFFICE VISIT (OUTPATIENT)
Dept: DIABETES | Facility: CLINIC | Age: 79
End: 2024-01-30
Payer: MEDICARE

## 2024-01-30 VITALS
DIASTOLIC BLOOD PRESSURE: 60 MMHG | SYSTOLIC BLOOD PRESSURE: 148 MMHG | WEIGHT: 128 LBS | HEIGHT: 65 IN | BODY MASS INDEX: 21.33 KG/M2

## 2024-01-30 VITALS
HEIGHT: 65 IN | DIASTOLIC BLOOD PRESSURE: 64 MMHG | WEIGHT: 128.31 LBS | BODY MASS INDEX: 21.38 KG/M2 | SYSTOLIC BLOOD PRESSURE: 148 MMHG | HEART RATE: 70 BPM

## 2024-01-30 DIAGNOSIS — I50.32 CHRONIC HEART FAILURE WITH PRESERVED EJECTION FRACTION: ICD-10-CM

## 2024-01-30 DIAGNOSIS — I50.42 CHRONIC COMBINED SYSTOLIC AND DIASTOLIC HEART FAILURE: ICD-10-CM

## 2024-01-30 DIAGNOSIS — I27.20 PULMONARY HYPERTENSION: ICD-10-CM

## 2024-01-30 DIAGNOSIS — Z79.4 TYPE 2 DIABETES MELLITUS WITH STABLE PROLIFERATIVE RETINOPATHY OF BOTH EYES, WITH LONG-TERM CURRENT USE OF INSULIN: Primary | ICD-10-CM

## 2024-01-30 DIAGNOSIS — E11.3553 TYPE 2 DIABETES MELLITUS WITH STABLE PROLIFERATIVE RETINOPATHY OF BOTH EYES, WITH LONG-TERM CURRENT USE OF INSULIN: Primary | ICD-10-CM

## 2024-01-30 DIAGNOSIS — I50.33 DIASTOLIC DYSFUNCTION WITH ACUTE ON CHRONIC HEART FAILURE: ICD-10-CM

## 2024-01-30 DIAGNOSIS — R06.02 SHORTNESS OF BREATH: ICD-10-CM

## 2024-01-30 DIAGNOSIS — I15.2 HYPERTENSION ASSOCIATED WITH DIABETES: ICD-10-CM

## 2024-01-30 DIAGNOSIS — E11.59 HYPERTENSION ASSOCIATED WITH DIABETES: ICD-10-CM

## 2024-01-30 DIAGNOSIS — E78.5 HYPERLIPIDEMIA ASSOCIATED WITH TYPE 2 DIABETES MELLITUS: ICD-10-CM

## 2024-01-30 DIAGNOSIS — N18.6 ESRD (END STAGE RENAL DISEASE): ICD-10-CM

## 2024-01-30 DIAGNOSIS — E11.69 HYPERLIPIDEMIA ASSOCIATED WITH TYPE 2 DIABETES MELLITUS: ICD-10-CM

## 2024-01-30 LAB
AORTIC ROOT ANNULUS: 2.73 CM
ASCENDING AORTA: 2.33 CM
AV INDEX (PROSTH): 0.69
AV MEAN GRADIENT: 8 MMHG
AV PEAK GRADIENT: 15 MMHG
AV VALVE AREA BY VELOCITY RATIO: 1.99 CM²
AV VALVE AREA: 1.94 CM²
AV VELOCITY RATIO: 0.71
BSA FOR ECHO PROCEDURE: 1.63 M2
CV ECHO LV RWT: 0.38 CM
CV STRESS BASE HR: 68 BPM
DIASTOLIC BLOOD PRESSURE: 69 MMHG
DOP CALC AO PEAK VEL: 1.94 M/S
DOP CALC AO VTI: 44.1 CM
DOP CALC LVOT AREA: 2.8 CM2
DOP CALC LVOT DIAMETER: 1.89 CM
DOP CALC LVOT PEAK VEL: 1.38 M/S
DOP CALC LVOT STROKE VOLUME: 85.53 CM3
DOP CALC RVOT PEAK VEL: 0.62 M/S
DOP CALC RVOT VTI: 14.4 CM
DOP CALCLVOT PEAK VEL VTI: 30.5 CM
E WAVE DECELERATION TIME: 163.98 MSEC
E/A RATIO: 1.54
E/E' RATIO: 29.11 M/S
ECHO LV POSTERIOR WALL: 0.99 CM (ref 0.6–1.1)
FRACTIONAL SHORTENING: 26 % (ref 28–44)
GLUCOSE SERPL-MCNC: 181 MG/DL (ref 70–110)
INTERVENTRICULAR SEPTUM: 1.1 CM (ref 0.6–1.1)
IVC DIAMETER: 1.54 CM
IVRT: 79.92 MSEC
LA MAJOR: 5.05 CM
LA MINOR: 4.18 CM
LA WIDTH: 3.6 CM
LEFT ATRIUM SIZE: 3.61 CM
LEFT ATRIUM VOLUME INDEX MOD: 27.5 ML/M2
LEFT ATRIUM VOLUME INDEX: 30.8 ML/M2
LEFT ATRIUM VOLUME MOD: 45.15 CM3
LEFT ATRIUM VOLUME: 50.53 CM3
LEFT INTERNAL DIMENSION IN SYSTOLE: 3.84 CM (ref 2.1–4)
LEFT VENTRICLE DIASTOLIC VOLUME INDEX: 79.02 ML/M2
LEFT VENTRICLE DIASTOLIC VOLUME: 129.59 ML
LEFT VENTRICLE MASS INDEX: 126 G/M2
LEFT VENTRICLE SYSTOLIC VOLUME INDEX: 38.6 ML/M2
LEFT VENTRICLE SYSTOLIC VOLUME: 63.36 ML
LEFT VENTRICULAR INTERNAL DIMENSION IN DIASTOLE: 5.2 CM (ref 3.5–6)
LEFT VENTRICULAR MASS: 205.95 G
LV LATERAL E/E' RATIO: 26.2 M/S
LV SEPTAL E/E' RATIO: 32.75 M/S
LVOT MG: 3.74 MMHG
LVOT MV: 0.9 CM/S
MV PEAK A VEL: 0.85 M/S
MV PEAK E VEL: 1.31 M/S
NUC REST EJECTION FRACTION: 40
NUC STRESS EJECTION FRACTION: 42 %
OHS CV CPX 85 PERCENT MAX PREDICTED HEART RATE MALE: 122
OHS CV CPX MAX PREDICTED HEART RATE: 144
OHS CV CPX PATIENT IS FEMALE: 1
OHS CV CPX PATIENT IS MALE: 0
OHS CV CPX PEAK DIASTOLIC BLOOD PRESSURE: 75 MMHG
OHS CV CPX PEAK HEAR RATE: 92 BPM
OHS CV CPX PEAK RATE PRESSURE PRODUCT: NORMAL
OHS CV CPX PEAK SYSTOLIC BLOOD PRESSURE: 206 MMHG
OHS CV CPX PERCENT MAX PREDICTED HEART RATE ACHIEVED: 66
OHS CV CPX RATE PRESSURE PRODUCT PRESENTING: NORMAL
OHS LV EJECTION FRACTION SIMPSONS BIPLANE MOD: 51 %
PISA TR MAX VEL: 4.47 M/S
PV MEAN GRADIENT: 1 MMHG
RA MAJOR: 3.88 CM
RA PRESSURE ESTIMATED: 8 MMHG
RA WIDTH: 3.38 CM
RIGHT VENTRICULAR END-DIASTOLIC DIMENSION: 3.49 CM
RV TB RVSP: 12 MMHG
SINUS: 2.21 CM
STJ: 2.08 CM
SYSTOLIC BLOOD PRESSURE: 169 MMHG
TDI LATERAL: 0.05 M/S
TDI SEPTAL: 0.04 M/S
TDI: 0.05 M/S
TR MAX PG: 80 MMHG
TRICUSPID ANNULAR PLANE SYSTOLIC EXCURSION: 1.29 CM
TV REST PULMONARY ARTERY PRESSURE: 88 MMHG
Z-SCORE OF LEFT VENTRICULAR DIMENSION IN END DIASTOLE: 1.19
Z-SCORE OF LEFT VENTRICULAR DIMENSION IN END SYSTOLE: 2.33

## 2024-01-30 PROCEDURE — 1101F PT FALLS ASSESS-DOCD LE1/YR: CPT | Mod: HCNC,CPTII,S$GLB, | Performed by: NURSE PRACTITIONER

## 2024-01-30 PROCEDURE — 93017 CV STRESS TEST TRACING ONLY: CPT | Mod: HCNC

## 2024-01-30 PROCEDURE — 82962 GLUCOSE BLOOD TEST: CPT | Mod: HCNC,S$GLB,, | Performed by: NURSE PRACTITIONER

## 2024-01-30 PROCEDURE — 1159F MED LIST DOCD IN RCRD: CPT | Mod: HCNC,CPTII,S$GLB, | Performed by: NURSE PRACTITIONER

## 2024-01-30 PROCEDURE — 99999 PR PBB SHADOW E&M-EST. PATIENT-LVL V: CPT | Mod: PBBFAC,HCNC,, | Performed by: NURSE PRACTITIONER

## 2024-01-30 PROCEDURE — 3288F FALL RISK ASSESSMENT DOCD: CPT | Mod: HCNC,CPTII,S$GLB, | Performed by: NURSE PRACTITIONER

## 2024-01-30 PROCEDURE — 99214 OFFICE O/P EST MOD 30 MIN: CPT | Mod: HCNC,S$GLB,, | Performed by: NURSE PRACTITIONER

## 2024-01-30 PROCEDURE — 93018 CV STRESS TEST I&R ONLY: CPT | Mod: HCNC,,, | Performed by: INTERNAL MEDICINE

## 2024-01-30 PROCEDURE — 93306 TTE W/DOPPLER COMPLETE: CPT | Mod: HCNC

## 2024-01-30 PROCEDURE — 63600175 PHARM REV CODE 636 W HCPCS: Mod: HCNC | Performed by: INTERNAL MEDICINE

## 2024-01-30 PROCEDURE — 3077F SYST BP >= 140 MM HG: CPT | Mod: HCNC,CPTII,S$GLB, | Performed by: NURSE PRACTITIONER

## 2024-01-30 PROCEDURE — 78452 HT MUSCLE IMAGE SPECT MULT: CPT | Mod: 26,HCNC,, | Performed by: INTERNAL MEDICINE

## 2024-01-30 PROCEDURE — A9502 TC99M TETROFOSMIN: HCPCS | Mod: HCNC

## 2024-01-30 PROCEDURE — 1126F AMNT PAIN NOTED NONE PRSNT: CPT | Mod: HCNC,CPTII,S$GLB, | Performed by: NURSE PRACTITIONER

## 2024-01-30 PROCEDURE — 93306 TTE W/DOPPLER COMPLETE: CPT | Mod: 26,HCNC,, | Performed by: INTERNAL MEDICINE

## 2024-01-30 PROCEDURE — 93016 CV STRESS TEST SUPVJ ONLY: CPT | Mod: HCNC,,, | Performed by: INTERNAL MEDICINE

## 2024-01-30 PROCEDURE — 1157F ADVNC CARE PLAN IN RCRD: CPT | Mod: HCNC,CPTII,S$GLB, | Performed by: NURSE PRACTITIONER

## 2024-01-30 PROCEDURE — 3078F DIAST BP <80 MM HG: CPT | Mod: HCNC,CPTII,S$GLB, | Performed by: NURSE PRACTITIONER

## 2024-01-30 PROCEDURE — 1160F RVW MEDS BY RX/DR IN RCRD: CPT | Mod: HCNC,CPTII,S$GLB, | Performed by: NURSE PRACTITIONER

## 2024-01-30 RX ORDER — INSULIN ASPART 100 [IU]/ML
INJECTION, SUSPENSION SUBCUTANEOUS
Qty: 15 ML | Refills: 3
Start: 2024-01-30 | End: 2024-02-08 | Stop reason: SDUPTHER

## 2024-01-30 RX ORDER — AMINOPHYLLINE 25 MG/ML
75 INJECTION, SOLUTION INTRAVENOUS
Status: COMPLETED | OUTPATIENT
Start: 2024-01-30 | End: 2024-01-30

## 2024-01-30 RX ORDER — REGADENOSON 0.08 MG/ML
0.4 INJECTION, SOLUTION INTRAVENOUS ONCE
Status: COMPLETED | OUTPATIENT
Start: 2024-01-30 | End: 2024-01-30

## 2024-01-30 RX ADMIN — REGADENOSON 0.4 MG: 0.08 INJECTION, SOLUTION INTRAVENOUS at 01:01

## 2024-01-30 RX ADMIN — AMINOPHYLLINE 75 MG: 25 INJECTION, SOLUTION INTRAVENOUS at 01:01

## 2024-01-30 NOTE — PROGRESS NOTES
PCP: Rose Germain MD    Subjective:     Chief Complaint: Diabetes follow up.  Last visit (audio) with me: 8/15/23    HPI: 78 y.o.  female for diabetes follow up.   Previously managed by JACK Pierce.   Last clinic visit 2016.  Type II diabetes since age 44.  Comorbidities: HTN, HLD, CHF, ESRD, CVA, MI, Dialysis Use - Sched MWF 5:30 AM-10:30 AM, Legally blind.  s/p CABG 2022  ESRD on dialysis x 2 years    Family History of Type 1/2 DM: mother  Known diabetes complications:  DKA/HHS: no  Retinopathy: yes  Peripheral neuropathy: yes  Nephropathy: yes    Interval history:  Daughter lEizabeth also on the phone, primary caregiver.  Accompanied by sister during visit.    Since last visit, continues to do well.  Dialysis schedule changed to 5:30 - 10:30 AM.  Continues to give insulin AC dinner on dialysis days.    Denies recent hospital admission or ER visit.  Denies any hypoglycemia or any symptoms.   Endorses diabetes related symptoms: Tingling in Lower Extremities and Both fingers    Blood glucose monitoring: via fingerstick, 2-4x/day  Unable to recall recent BG readings.    Activity level: Sedentary  Meal Plannin meals/day; infrequent snacks/day.  On dialysis days, main meal is dinner.    Medication compliance all of the time, diet compliance most of the time.   To be enrolled in diabetes education classes.     Previous treatment: Novolin 70/30, Ozempic, Lantus, Humulin 70/30    Past failed treatment:  None    Current DM Medications:  Diabetes Medications               insulin aspart protamine-insulin aspart (NOVOLOG MIX 70-30FLEXPEN U-100) 100 unit/mL (70-30) InPn pen Inject 22 units into skin before breakfast, 10 units before dinner. On dialysis days, give 12 units before dinner.  Taking 14 units ac dinner on dialysis days  Taking 24 units acb and 10 units ac dinner on non dialysis days       Allergies  Review of patient's allergies indicates:   Allergen Reactions    Norvasc  "[amlodipine] Shortness Of Breath    Codeine Swelling    Darvon [propoxyphene] Swelling    Atorvastatin      Muscle twitching       Labs Reviewed:  Her most recent A1C is:  Lab Results   Component Value Date    HGBA1C 6.9 (H) 12/05/2023    HGBA1C 6.6 (H) 04/04/2023    HGBA1C 5.3 09/13/2022       Her most recent BMP is:  Lab Results   Component Value Date     12/05/2023    K 4.1 12/05/2023     12/05/2023    CO2 24 12/05/2023    BUN 23 12/05/2023    CREATININE 5.3 (H) 12/05/2023    CALCIUM 9.7 12/05/2023    ANIONGAP 15 12/05/2023    ESTGFRAFRICA 8 (A) 07/27/2022    ESTGFRAFRICA 8 (A) 07/27/2022    EGFRNONAA 7 (A) 07/27/2022    EGFRNONAA 7 (A) 07/27/2022       No results found for: "CPEPTIDE"  No results found for: "GLUTAMICACID"    Body mass index is 21.35 kg/m².    Wt Readings from Last 3 Encounters:   01/30/24 1512 58.1 kg (128 lb)   01/30/24 1521 58.2 kg (128 lb 4.9 oz)   01/17/24 1405 58.4 kg (128 lb 12 oz)        Her blood sugar in clinic today is:  Lab Results   Component Value Date    POCGLU 181 (A) 01/30/2024       Diabetes Management Status  Statin: Taking  ACE/ARB: Taking    Screening or Prevention Patient's value Goal Complete/Controlled?   HgA1C Testing and Control   Lab Results   Component Value Date    HGBA1C 6.9 (H) 12/05/2023      Annually/Less than 8% Yes   Lipid profile : 12/05/2023 Annually Yes   LDL control Lab Results   Component Value Date    LDLCALC 81.0 12/05/2023    Annually/Less than 100 mg/dl  Yes   Nephropathy screening Lab Results   Component Value Date    MICALBCREAT 1244.0 (H) 06/13/2018     Lab Results   Component Value Date    PROTEINUA 3+ (A) 08/26/2022    Annually No   Blood pressure BP Readings from Last 1 Encounters:   01/30/24 (!) 148/60    Less than 140/90 No   Dilated retinal exam : 11/27/2023 Annually Yes    Foot exam   : 01/30/2024 Annually No     Social History     Socioeconomic History    Marital status:    Tobacco Use    Smoking status: Former     Current " packs/day: 0.00     Average packs/day: 1.5 packs/day for 30.0 years (45.0 ttl pk-yrs)     Types: Cigarettes     Start date: 1960     Quit date: 1990     Years since quittin.0    Smokeless tobacco: Former   Substance and Sexual Activity    Alcohol use: No     Alcohol/week: 0.0 standard drinks of alcohol    Drug use: No    Sexual activity: Not Currently     Social Determinants of Health     Financial Resource Strain: Low Risk  (2023)    Overall Financial Resource Strain (CARDIA)     Difficulty of Paying Living Expenses: Not very hard   Food Insecurity: No Food Insecurity (2023)    Hunger Vital Sign     Worried About Running Out of Food in the Last Year: Never true     Ran Out of Food in the Last Year: Never true   Transportation Needs: No Transportation Needs (2023)    PRAPARE - Transportation     Lack of Transportation (Medical): No     Lack of Transportation (Non-Medical): No   Physical Activity: Unknown (2023)    Exercise Vital Sign     Days of Exercise per Week: 7 days   Recent Concern: Physical Activity - Inactive (2023)    Exercise Vital Sign     Days of Exercise per Week: 7 days     Minutes of Exercise per Session: 0 min   Stress: No Stress Concern Present (2023)    [employer] - Occupational Stress Questionnaire     Feeling of Stress : Not at all   Social Connections: Moderately Integrated (2023)    Social Connection and Isolation Panel [NHANES]     Frequency of Communication with Friends and Family: More than three times a week     Frequency of Social Gatherings with Friends and Family: Once a week     Attends Bahai Services: More than 4 times per year     Active Member of Clubs or Organizations: No     Attends Club or Organization Meetings: Never     Marital Status:    Housing Stability: Low Risk  (2023)    Housing Stability Vital Sign     Unable to Pay for Housing in the Last Year: No     Number of Places Lived in  the Last Year: 1     Unstable Housing in the Last Year: No     Past Medical History:   Diagnosis Date    Acute hypoxemic respiratory failure 11/26/2018    Anemia     Arthritis     back, hand, knees    Back pain     Cataract     CKD stage G4/A3, GFR 15 - 29 and albumin creatinine ratio >300 mg/g     GFR 40% Jan 2014 and 33% ub 3/2014 ([facility])    Coronary artery disease involving native coronary artery of native heart 11/30/2018    Diabetic retinopathy     DM (diabetes mellitus) type II controlled, neurological manifestation     Exudative age-related macular degeneration of left eye with active choroidal neovascularization 2/5/2019    GERD (gastroesophageal reflux disease)     Glaucoma     Heart failure     Hyperlipidemia     Hypertension     Non-ST elevation MI (NSTEMI) 11/28/2018    NSTEMI (non-ST elevated myocardial infarction) 11/27/2018    Peripheral neuropathy     Polyneuropathy     Proteinuria     >6 mo     Seizures     [facility] 1/2014 -dick CT [place] showed small vessel    Skin ulcer of toe of right foot with fat layer exposed 10/14/2022    Stroke 2012,2014    Tobacco dependence     Type 2 diabetes with peripheral circulatory disorder, controlled      Review of Systems   Constitutional: Negative for activity change, appetite change, fatigue and unexpected weight change.   HENT: Negative for dental problem and trouble swallowing.    Eyes: Negative for visual disturbance. Legally blind.  Respiratory: Negative for chest tightness and shortness of breath.    Cardiovascular: Negative for chest pain, palpitations and leg swelling.   Gastrointestinal: Negative for abdominal pain, constipation, diarrhea, nausea and vomiting.   Endocrine: Negative for polydipsia, polyphagia and polyuria.   Genitourinary: Negative for difficulty urinating, dysuria, frequency and urgency.        Negative yeast infection   Musculoskeletal: Negative for gait problem, myalgias and neck pain.   Integumentary:  Negative for rash and wound.    Allergic/Immunologic: Negative.    Neurological: Positive for numbness (intermittent both feet). Negative for dizziness, syncope, weakness and headaches.        Int tingling BLE, hands   Hematological: Negative.    Psychiatric/Behavioral: Negative for confusion and sleep disturbance.   All other systems reviewed and are negative.     Objective:      PPhysical Exam  Vitals reviewed.   Constitutional:       Appearance: Normal appearance.   HENT:      Head: Normocephalic and atraumatic.   Eyes:      General: Vision grossly intact.      Extraocular Movements: Extraocular movements intact.      Pupils: Pupils are equal, round, and reactive to light.   Neck:      Thyroid: No thyroid mass or thyroid tenderness.   Cardiovascular:      Rate and Rhythm: Normal rate and regular rhythm.      Pulses: Normal pulses.           Radial pulses are 2+ on the right side and 2+ on the left side.        Dorsalis pedis pulses are 2+ on the right side and 2+ on the left side.      Heart sounds: Normal heart sounds.      Arteriovenous access: left arteriovenous access is present.     Comments: Left upper arm AVF + thrill, + bruit, covered with gauze dressing  Pulmonary:      Effort: Pulmonary effort is normal.      Breath sounds: Normal breath sounds.   Abdominal:      General: Bowel sounds are normal.      Palpations: Abdomen is soft.   Musculoskeletal:         General: Normal range of motion.      Cervical back: Normal range of motion and neck supple.      Right lower leg: No edema.      Left lower leg: No edema.      Right foot: No deformity or bunion.      Left foot: No deformity or bunion.   Feet:      Right foot:      Protective Sensation: 6 sites tested. 6 sites sensed.      Skin integrity: Dry skin present. No ulcer, skin breakdown or callus.      Toenail Condition: Right toenails are abnormally thick.      Left foot:      Protective Sensation: 6 sites tested. 6 sites sensed.      Skin integrity: Dry skin present. No ulcer, skin  breakdown or callus.      Toenail Condition: Left toenails are abnormally thick.       Comments: Pinprick exam completed with 10-g monofilament. Vibration sensation intact.  Lymphadenopathy:      Cervical: No cervical adenopathy.   Skin:     General: Skin is warm and dry.      Findings: No rash or wound.      Comments: Negative for acanthosis nigricans. Well-healed SQ injection sites.   Neurological:      Mental Status: She is alert and oriented to person, place, and time.      Coordination: Coordination is intact.      Gait: Gait is intact.   Psychiatric:         Attention and Perception: Attention normal.         Mood and Affect: Mood normal.         Speech: Speech normal.         Behavior: Behavior normal. Behavior is cooperative.         Thought Content: Thought content normal.         Cognition and Memory: Cognition normal.     Assessment / Plan:     Diagnoses and all orders for this visit:    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, with long-term current use of insulin  -     POCT Glucose, Hand-Held Device  -     insulin aspart protamine-insulin aspart (NOVOLOG MIX 70-30FLEXPEN U-100) 100 unit/mL (70-30) InPn pen; Inject 24 units into skin before breakfast, 10 units before dinner. On dialysis days, give 14 units before dinner.  -     Ambulatory referral/consult to Diabetes Education; Future    Hypertension associated with diabetes    Hyperlipidemia associated with type 2 diabetes mellitus    ESRD (end stage renal disease)    Additional Plan Details:  - Condition: controlled, most recent A1c of 6.9% was completed 1 month ago.   - Monitor blood glucose:  3-4x daily.Goals reviewed. Maintain BG log. Begin CGM Piper use. Pt admits has supplies at home.  - CGM note: Patient is testing 4 times per day. Patient is injecting meal time insulin 2 times daily. Patient requires CGM for blood sugar monitoring due to frequent insulin dose changes. Patient reports history of hypoglycemia.  Of note, she is legally blind.  - Hypoglycemia protocol: Reviewed and risk discussed.  Notify if BG readings below 80. Protocol printout provided.   - Diet: Limit carbohydrate intake 30-45 grams/meal, 3x daily and 15 grams/snack, limit 2/day.   - Activity: Recommend at least 150 minutes of exercise in a week.  - BP and LDL: Recommend lifestyle modifications and follow up with PCP for management.   - Medication Regimen:     Continue Novolog 70/30   On dialysis days MWF, 14 units before dinner  On non- dialysis days, 24 units before breakfast and 10 units before dinner    - Medication consideration: Continue regimen.   - Lab results: A1C discussed.  - Health Maintenance standards addressed today:  A1c to be scheduled.   - Risks of uncontrolled DM explained. Importance of routine foot and eye exams reviewed. Scheduled as appropriate.  -The patient was explained the above plan and given opportunity to ask questions.  She understands, chooses and consents to this plan and accepts all the risks, which include but are not limited to the risks mentioned above.   - Labs ordered as above.  - CDE referral: Will schedule for CGM Piper training.  - Follow up: 6 months ONLC.        Charlotte T. Delacruz, FNP-C [facility]    A total of 30 minutes was spent in face to face time, of which over 50 % was spent in counseling patient on disease process, complications, treatment, and side effects of medications.

## 2024-01-30 NOTE — H&P (VIEW-ONLY)
PCP: Rose Germain MD    Subjective:     Chief Complaint: Diabetes follow up.  Last visit (audio) with me: 8/15/23    HPI: 78 y.o.  female for diabetes follow up.   Previously managed by JACK Pierce.   Last clinic visit 2016.  Type II diabetes since age 44.  Comorbidities: HTN, HLD, CHF, ESRD, CVA, MI, Dialysis Use - Sched MWF 5:30 AM-10:30 AM, Legally blind.  s/p CABG 2022  ESRD on dialysis x 2 years    Family History of Type 1/2 DM: mother  Known diabetes complications:  DKA/HHS: no  Retinopathy: yes  Peripheral neuropathy: yes  Nephropathy: yes    Interval history:  Daughter Elizabeth also on the phone, primary caregiver.  Accompanied by sister during visit.    Since last visit, continues to do well.  Dialysis schedule changed to 5:30 - 10:30 AM.  Continues to give insulin AC dinner on dialysis days.    Denies recent hospital admission or ER visit.  Denies any hypoglycemia or any symptoms.   Endorses diabetes related symptoms: Tingling in Lower Extremities and Both fingers    Blood glucose monitoring: via fingerstick, 2-4x/day  Unable to recall recent BG readings.    Activity level: Sedentary  Meal Plannin meals/day; infrequent snacks/day.  On dialysis days, main meal is dinner.    Medication compliance all of the time, diet compliance most of the time.   To be enrolled in diabetes education classes.     Previous treatment: Novolin 70/30, Ozempic, Lantus, Humulin 70/30    Past failed treatment:  None    Current DM Medications:  Diabetes Medications               insulin aspart protamine-insulin aspart (NOVOLOG MIX 70-30FLEXPEN U-100) 100 unit/mL (70-30) InPn pen Inject 22 units into skin before breakfast, 10 units before dinner. On dialysis days, give 12 units before dinner.  Taking 14 units ac dinner on dialysis days  Taking 24 units acb and 10 units ac dinner on non dialysis days       Allergies  Review of patient's allergies indicates:   Allergen Reactions    Norvasc  "[amlodipine] Shortness Of Breath    Codeine Swelling    Darvon [propoxyphene] Swelling    Atorvastatin      Muscle twitching       Labs Reviewed:  Her most recent A1C is:  Lab Results   Component Value Date    HGBA1C 6.9 (H) 12/05/2023    HGBA1C 6.6 (H) 04/04/2023    HGBA1C 5.3 09/13/2022       Her most recent BMP is:  Lab Results   Component Value Date     12/05/2023    K 4.1 12/05/2023     12/05/2023    CO2 24 12/05/2023    BUN 23 12/05/2023    CREATININE 5.3 (H) 12/05/2023    CALCIUM 9.7 12/05/2023    ANIONGAP 15 12/05/2023    ESTGFRAFRICA 8 (A) 07/27/2022    ESTGFRAFRICA 8 (A) 07/27/2022    EGFRNONAA 7 (A) 07/27/2022    EGFRNONAA 7 (A) 07/27/2022       No results found for: "CPEPTIDE"  No results found for: "GLUTAMICACID"    Body mass index is 21.35 kg/m².    Wt Readings from Last 3 Encounters:   01/30/24 1512 58.1 kg (128 lb)   01/30/24 1521 58.2 kg (128 lb 4.9 oz)   01/17/24 1405 58.4 kg (128 lb 12 oz)        Her blood sugar in clinic today is:  Lab Results   Component Value Date    POCGLU 181 (A) 01/30/2024       Diabetes Management Status  Statin: Taking  ACE/ARB: Taking    Screening or Prevention Patient's value Goal Complete/Controlled?   HgA1C Testing and Control   Lab Results   Component Value Date    HGBA1C 6.9 (H) 12/05/2023      Annually/Less than 8% Yes   Lipid profile : 12/05/2023 Annually Yes   LDL control Lab Results   Component Value Date    LDLCALC 81.0 12/05/2023    Annually/Less than 100 mg/dl  Yes   Nephropathy screening Lab Results   Component Value Date    MICALBCREAT 1244.0 (H) 06/13/2018     Lab Results   Component Value Date    PROTEINUA 3+ (A) 08/26/2022    Annually No   Blood pressure BP Readings from Last 1 Encounters:   01/30/24 (!) 148/60    Less than 140/90 No   Dilated retinal exam : 11/27/2023 Annually Yes    Foot exam   : 01/30/2024 Annually No     Social History     Socioeconomic History    Marital status:    Tobacco Use    Smoking status: Former     Current " packs/day: 0.00     Average packs/day: 1.5 packs/day for 30.0 years (45.0 ttl pk-yrs)     Types: Cigarettes     Start date: 1960     Quit date: 1990     Years since quittin.0    Smokeless tobacco: Former   Substance and Sexual Activity    Alcohol use: No     Alcohol/week: 0.0 standard drinks of alcohol    Drug use: No    Sexual activity: Not Currently     Social Determinants of Health     Financial Resource Strain: Low Risk  (2023)    Overall Financial Resource Strain (CARDIA)     Difficulty of Paying Living Expenses: Not very hard   Food Insecurity: No Food Insecurity (2023)    Hunger Vital Sign     Worried About Running Out of Food in the Last Year: Never true     Ran Out of Food in the Last Year: Never true   Transportation Needs: No Transportation Needs (2023)    PRAPARE - Transportation     Lack of Transportation (Medical): No     Lack of Transportation (Non-Medical): No   Physical Activity: Unknown (2023)    Exercise Vital Sign     Days of Exercise per Week: 7 days   Recent Concern: Physical Activity - Inactive (2023)    Exercise Vital Sign     Days of Exercise per Week: 7 days     Minutes of Exercise per Session: 0 min   Stress: No Stress Concern Present (2023)    [place] [employer] - Occupational Stress Questionnaire     Feeling of Stress : Not at all   Social Connections: Moderately Integrated (2023)    Social Connection and Isolation Panel [NHANES]     Frequency of Communication with Friends and Family: More than three times a week     Frequency of Social Gatherings with Friends and Family: Once a week     Attends Church Services: More than 4 times per year     Active Member of Clubs or Organizations: No     Attends Club or Organization Meetings: Never     Marital Status:    Housing Stability: Low Risk  (2023)    Housing Stability Vital Sign     Unable to Pay for Housing in the Last Year: No     Number of Places Lived in  the Last Year: 1     Unstable Housing in the Last Year: No     Past Medical History:   Diagnosis Date    Acute hypoxemic respiratory failure 11/26/2018    Anemia     Arthritis     back, hand, knees    Back pain     Cataract     CKD stage G4/A3, GFR 15 - 29 and albumin creatinine ratio >300 mg/g     GFR 40% Jan 2014 and 33% ub 3/2014 ([facility])    Coronary artery disease involving native coronary artery of native heart 11/30/2018    Diabetic retinopathy     DM (diabetes mellitus) type II controlled, neurological manifestation     Exudative age-related macular degeneration of left eye with active choroidal neovascularization 2/5/2019    GERD (gastroesophageal reflux disease)     Glaucoma     Heart failure     Hyperlipidemia     Hypertension     Non-ST elevation MI (NSTEMI) 11/28/2018    NSTEMI (non-ST elevated myocardial infarction) 11/27/2018    Peripheral neuropathy     Polyneuropathy     Proteinuria     >6 mo     Seizures     [facility]G 1/2014 -dick CT [place] showed small vessel    Skin ulcer of toe of right foot with fat layer exposed 10/14/2022    Stroke 2012,2014    Tobacco dependence     Type 2 diabetes with peripheral circulatory disorder, controlled      Review of Systems   Constitutional: Negative for activity change, appetite change, fatigue and unexpected weight change.   HENT: Negative for dental problem and trouble swallowing.    Eyes: Negative for visual disturbance. Legally blind.  Respiratory: Negative for chest tightness and shortness of breath.    Cardiovascular: Negative for chest pain, palpitations and leg swelling.   Gastrointestinal: Negative for abdominal pain, constipation, diarrhea, nausea and vomiting.   Endocrine: Negative for polydipsia, polyphagia and polyuria.   Genitourinary: Negative for difficulty urinating, dysuria, frequency and urgency.        Negative yeast infection   Musculoskeletal: Negative for gait problem, myalgias and neck pain.   Integumentary:  Negative for rash and wound.    Allergic/Immunologic: Negative.    Neurological: Positive for numbness (intermittent both feet). Negative for dizziness, syncope, weakness and headaches.        Int tingling BLE, hands   Hematological: Negative.    Psychiatric/Behavioral: Negative for confusion and sleep disturbance.   All other systems reviewed and are negative.     Objective:      PPhysical Exam  Vitals reviewed.   Constitutional:       Appearance: Normal appearance.   HENT:      Head: Normocephalic and atraumatic.   Eyes:      General: Vision grossly intact.      Extraocular Movements: Extraocular movements intact.      Pupils: Pupils are equal, round, and reactive to light.   Neck:      Thyroid: No thyroid mass or thyroid tenderness.   Cardiovascular:      Rate and Rhythm: Normal rate and regular rhythm.      Pulses: Normal pulses.           Radial pulses are 2+ on the right side and 2+ on the left side.        Dorsalis pedis pulses are 2+ on the right side and 2+ on the left side.      Heart sounds: Normal heart sounds.      Arteriovenous access: left arteriovenous access is present.     Comments: Left upper arm AVF + thrill, + bruit, covered with gauze dressing  Pulmonary:      Effort: Pulmonary effort is normal.      Breath sounds: Normal breath sounds.   Abdominal:      General: Bowel sounds are normal.      Palpations: Abdomen is soft.   Musculoskeletal:         General: Normal range of motion.      Cervical back: Normal range of motion and neck supple.      Right lower leg: No edema.      Left lower leg: No edema.      Right foot: No deformity or bunion.      Left foot: No deformity or bunion.   Feet:      Right foot:      Protective Sensation: 6 sites tested. 6 sites sensed.      Skin integrity: Dry skin present. No ulcer, skin breakdown or callus.      Toenail Condition: Right toenails are abnormally thick.      Left foot:      Protective Sensation: 6 sites tested. 6 sites sensed.      Skin integrity: Dry skin present. No ulcer, skin  breakdown or callus.      Toenail Condition: Left toenails are abnormally thick.       Comments: Pinprick exam completed with 10-g monofilament. Vibration sensation intact.  Lymphadenopathy:      Cervical: No cervical adenopathy.   Skin:     General: Skin is warm and dry.      Findings: No rash or wound.      Comments: Negative for acanthosis nigricans. Well-healed SQ injection sites.   Neurological:      Mental Status: She is alert and oriented to person, place, and time.      Coordination: Coordination is intact.      Gait: Gait is intact.   Psychiatric:         Attention and Perception: Attention normal.         Mood and Affect: Mood normal.         Speech: Speech normal.         Behavior: Behavior normal. Behavior is cooperative.         Thought Content: Thought content normal.         Cognition and Memory: Cognition normal.     Assessment / Plan:     Diagnoses and all orders for this visit:    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, with long-term current use of insulin  -     POCT Glucose, Hand-Held Device  -     insulin aspart protamine-insulin aspart (NOVOLOG MIX 70-30FLEXPEN U-100) 100 unit/mL (70-30) InPn pen; Inject 24 units into skin before breakfast, 10 units before dinner. On dialysis days, give 14 units before dinner.  -     Ambulatory referral/consult to Diabetes Education; Future    Hypertension associated with diabetes    Hyperlipidemia associated with type 2 diabetes mellitus    ESRD (end stage renal disease)    Additional Plan Details:  - Condition: controlled, most recent A1c of 6.9% was completed 1 month ago.   - Monitor blood glucose:  3-4x daily.Goals reviewed. Maintain BG log. Begin CGM Piper use. Pt admits has supplies at home.  - CGM note: Patient is testing 4 times per day. Patient is injecting meal time insulin 2 times daily. Patient requires CGM for blood sugar monitoring due to frequent insulin dose changes. Patient reports history of hypoglycemia.  Of note, she is legally blind.  - Hypoglycemia protocol: Reviewed and risk discussed.  Notify if BG readings below 80. Protocol printout provided.   - Diet: Limit carbohydrate intake 30-45 grams/meal, 3x daily and 15 grams/snack, limit 2/day.   - Activity: Recommend at least 150 minutes of exercise in a week.  - BP and LDL: Recommend lifestyle modifications and follow up with PCP for management.   - Medication Regimen:     Continue Novolog 70/30   On dialysis days MWF, 14 units before dinner  On non- dialysis days, 24 units before breakfast and 10 units before dinner    - Medication consideration: Continue regimen.   - Lab results: A1C discussed.  - Health Maintenance standards addressed today:  A1c to be scheduled.   - Risks of uncontrolled DM explained. Importance of routine foot and eye exams reviewed. Scheduled as appropriate.  -The patient was explained the above plan and given opportunity to ask questions.  She understands, chooses and consents to this plan and accepts all the risks, which include but are not limited to the risks mentioned above.   - Labs ordered as above.  - CDE referral: Will schedule for CGM Piper training.  - Follow up: 6 months ONLC.        Charlotte T. Delacruz, FNP-C [facility]    A total of 30 minutes was spent in face to face time, of which over 50 % was spent in counseling patient on disease process, complications, treatment, and side effects of medications.

## 2024-01-30 NOTE — PATIENT INSTRUCTIONS
Medication Regimen:   Continue Novolog 70/30   On dialysis days MWF, 14 units before dinner  On non- dialysis days, 24 units before breakfast and 10 units before dinner    Patient Instructions:  Carbohydrate Count: 30-45 grams/meal and 15 grams/snack with limit of 2 snacks per day.  Exercise: Goal is 150 minutes or more per week.  Bring meter and blood sugar log to each appointment.     Goals for Blood Sugar:  Fastin-130 mg/dl  2 hours after meal:  mg/dl  Before Bed: 100-150 mg/dl  If Blood sugar is below 70 mg/dl, DO NOT take diabetes medication. Eat first and then recheck blood sugar in 20 minutes.  Foods to eat if blood sugar is below 70 mg/dl  1/2 glass of orange juice   1/2 regular cola can   3-4 hard candies   3-4 small glucose tabs.   Foods to eat if blood sugar is below 50 mg/dl  1 glass of orange juice  1 regular cola can  8 hard candies  8 small glucose tabs.   If you have repeated eating/blood sugar recheck process 2 times and blood sugar still below 70 mg/dl, call 911.

## 2024-01-31 NOTE — Clinical Note
The catheter was removed from the ostium   left main.  [FreeTextEntry1] : 50 year old woman with iron deficiency anemia liekly 2/2 to chronic blood loss from menstruation/fibroids  - IV Fe Side effects explained including but not limited to infusion reactions, myalgias, arthralgias, injection site pain, nausea, headaches - Advised to follow with GI and OBGYN

## 2024-02-02 ENCOUNTER — TELEPHONE (OUTPATIENT)
Dept: CARDIOLOGY | Facility: CLINIC | Age: 79
End: 2024-02-02
Payer: MEDICARE

## 2024-02-02 DIAGNOSIS — I25.118 CORONARY ARTERY DISEASE OF NATIVE ARTERY OF NATIVE HEART WITH STABLE ANGINA PECTORIS: ICD-10-CM

## 2024-02-02 DIAGNOSIS — R07.9 CHEST PAIN, UNSPECIFIED TYPE: Primary | ICD-10-CM

## 2024-02-02 DIAGNOSIS — R06.02 SHORTNESS OF BREATH: ICD-10-CM

## 2024-02-02 NOTE — TELEPHONE ENCOUNTER
Patient contacted an was advised that Her stress test showed significant lack of blood to front of the heart needs repeat heart cath per River's note.   Her heart function is normal. The patient would like a first available date and time please. Procedure nurse will provide date and patient will be contacted again.    Patient stated understanding with no questions or concern. Nurse visit  will be set up for consents and labs.

## 2024-02-07 ENCOUNTER — TELEPHONE (OUTPATIENT)
Dept: DIABETES | Facility: CLINIC | Age: 79
End: 2024-02-07
Payer: MEDICARE

## 2024-02-08 ENCOUNTER — LAB VISIT (OUTPATIENT)
Dept: LAB | Facility: HOSPITAL | Age: 79
End: 2024-02-08
Attending: INTERNAL MEDICINE
Payer: MEDICARE

## 2024-02-08 DIAGNOSIS — R07.9 CHEST PAIN, UNSPECIFIED TYPE: ICD-10-CM

## 2024-02-08 DIAGNOSIS — Z79.4 TYPE 2 DIABETES MELLITUS WITH STABLE PROLIFERATIVE RETINOPATHY OF BOTH EYES, WITH LONG-TERM CURRENT USE OF INSULIN: ICD-10-CM

## 2024-02-08 DIAGNOSIS — R06.02 SHORTNESS OF BREATH: ICD-10-CM

## 2024-02-08 DIAGNOSIS — E11.3553 TYPE 2 DIABETES MELLITUS WITH STABLE PROLIFERATIVE RETINOPATHY OF BOTH EYES, WITH LONG-TERM CURRENT USE OF INSULIN: ICD-10-CM

## 2024-02-08 DIAGNOSIS — I25.118 CORONARY ARTERY DISEASE OF NATIVE ARTERY OF NATIVE HEART WITH STABLE ANGINA PECTORIS: ICD-10-CM

## 2024-02-08 LAB
ANION GAP SERPL CALC-SCNC: 12 MMOL/L (ref 8–16)
BASOPHILS # BLD AUTO: 0.01 K/UL (ref 0–0.2)
BASOPHILS NFR BLD: 0.2 % (ref 0–1.9)
BUN SERPL-MCNC: 18 MG/DL (ref 8–23)
CALCIUM SERPL-MCNC: 9.9 MG/DL (ref 8.7–10.5)
CHLORIDE SERPL-SCNC: 99 MMOL/L (ref 95–110)
CO2 SERPL-SCNC: 26 MMOL/L (ref 23–29)
CREAT SERPL-MCNC: 4.4 MG/DL (ref 0.5–1.4)
DIFFERENTIAL METHOD BLD: ABNORMAL
EOSINOPHIL # BLD AUTO: 0.1 K/UL (ref 0–0.5)
EOSINOPHIL NFR BLD: 0.8 % (ref 0–8)
ERYTHROCYTE [DISTWIDTH] IN BLOOD BY AUTOMATED COUNT: 13.8 % (ref 11.5–14.5)
EST. GFR  (NO RACE VARIABLE): 9.8 ML/MIN/1.73 M^2
GLUCOSE SERPL-MCNC: 192 MG/DL (ref 70–110)
HCT VFR BLD AUTO: 35.2 % (ref 37–48.5)
HGB BLD-MCNC: 10.9 G/DL (ref 12–16)
IMM GRANULOCYTES # BLD AUTO: 0.02 K/UL (ref 0–0.04)
IMM GRANULOCYTES NFR BLD AUTO: 0.3 % (ref 0–0.5)
LYMPHOCYTES # BLD AUTO: 0.6 K/UL (ref 1–4.8)
LYMPHOCYTES NFR BLD: 10.2 % (ref 18–48)
MCH RBC QN AUTO: 31.4 PG (ref 27–31)
MCHC RBC AUTO-ENTMCNC: 31 G/DL (ref 32–36)
MCV RBC AUTO: 101 FL (ref 82–98)
MONOCYTES # BLD AUTO: 0.4 K/UL (ref 0.3–1)
MONOCYTES NFR BLD: 6.9 % (ref 4–15)
NEUTROPHILS # BLD AUTO: 5.1 K/UL (ref 1.8–7.7)
NEUTROPHILS NFR BLD: 81.6 % (ref 38–73)
NRBC BLD-RTO: 0 /100 WBC
PLATELET # BLD AUTO: 245 K/UL (ref 150–450)
PMV BLD AUTO: 9.6 FL (ref 9.2–12.9)
POTASSIUM SERPL-SCNC: 3.7 MMOL/L (ref 3.5–5.1)
RBC # BLD AUTO: 3.47 M/UL (ref 4–5.4)
SODIUM SERPL-SCNC: 137 MMOL/L (ref 136–145)
WBC # BLD AUTO: 6.26 K/UL (ref 3.9–12.7)

## 2024-02-08 PROCEDURE — 36415 COLL VENOUS BLD VENIPUNCTURE: CPT | Mod: HCNC | Performed by: INTERNAL MEDICINE

## 2024-02-08 PROCEDURE — 85610 PROTHROMBIN TIME: CPT | Mod: HCNC | Performed by: INTERNAL MEDICINE

## 2024-02-08 PROCEDURE — 85730 THROMBOPLASTIN TIME PARTIAL: CPT | Mod: HCNC | Performed by: INTERNAL MEDICINE

## 2024-02-08 PROCEDURE — 80048 BASIC METABOLIC PNL TOTAL CA: CPT | Mod: HCNC | Performed by: INTERNAL MEDICINE

## 2024-02-08 PROCEDURE — 85025 COMPLETE CBC W/AUTO DIFF WBC: CPT | Mod: HCNC | Performed by: INTERNAL MEDICINE

## 2024-02-08 RX ORDER — INSULIN ASPART 100 [IU]/ML
INJECTION, SUSPENSION SUBCUTANEOUS
Qty: 15 ML | Refills: 3
Start: 2024-02-08

## 2024-02-08 RX ORDER — INSULIN ASPART 100 [IU]/ML
INJECTION, SUSPENSION SUBCUTANEOUS
Qty: 15 ML | Refills: 3 | Status: ON HOLD | OUTPATIENT
Start: 2024-02-08 | End: 2024-03-16

## 2024-02-08 NOTE — TELEPHONE ENCOUNTER
----- Message from Patricia Anne sent at 2/8/2024  2:59 PM CST -----  Regarding: refill  Can the clinic reply in [facility]:       Please refill the medication(s) listed below. Please call the patient when the prescription(s) is ready for  at this phone number   SERVANDO BUSTILLO [[id number]] lia ( daughter ) [phone] please resubmit script to pharmacy it did not transmit to pharmacy .      Medication #1 insulin aspart protamine-insulin aspart (NOVOLOG MIX 70-30FLEXPEN U-100) 100 unit/mL (70-30) InPn pen        Preferred Pharmacy:   [address] [facility] - [id number] [facility]  [address] [facility] [address]  Phone: [phone] Fax: [phone]

## 2024-02-09 LAB
APTT PPP: 29.9 SEC (ref 21–32)
INR PPP: 1 (ref 0.8–1.2)
PROTHROMBIN TIME: 10.8 SEC (ref 9–12.5)

## 2024-02-12 PROBLEM — R94.39 ABNORMAL NUCLEAR STRESS TEST: Status: ACTIVE | Noted: 2024-02-12

## 2024-02-13 ENCOUNTER — HOSPITAL ENCOUNTER (OUTPATIENT)
Facility: HOSPITAL | Age: 79
Discharge: HOME | End: 2024-02-13
Attending: INTERNAL MEDICINE | Admitting: INTERNAL MEDICINE
Payer: MEDICARE

## 2024-02-13 DIAGNOSIS — I50.33 DIASTOLIC DYSFUNCTION WITH ACUTE ON CHRONIC HEART FAILURE: ICD-10-CM

## 2024-02-13 DIAGNOSIS — I27.20 PULMONARY HTN: ICD-10-CM

## 2024-02-13 DIAGNOSIS — R06.02 SOB (SHORTNESS OF BREATH): ICD-10-CM

## 2024-02-13 DIAGNOSIS — Z95.1 HX OF CABG: ICD-10-CM

## 2024-02-13 DIAGNOSIS — I50.32 CHRONIC HEART FAILURE WITH PRESERVED EJECTION FRACTION: Primary | ICD-10-CM

## 2024-02-13 DIAGNOSIS — R94.39 ABNORMAL NUCLEAR STRESS TEST: ICD-10-CM

## 2024-02-13 DIAGNOSIS — I25.10 CAD S/P PERCUTANEOUS CORONARY ANGIOPLASTY: ICD-10-CM

## 2024-02-13 DIAGNOSIS — Z98.61 CAD S/P PERCUTANEOUS CORONARY ANGIOPLASTY: ICD-10-CM

## 2024-02-13 DIAGNOSIS — I25.10 CORONARY ARTERY DISEASE INVOLVING NATIVE CORONARY ARTERY OF NATIVE HEART, UNSPECIFIED WHETHER ANGINA PRESENT: ICD-10-CM

## 2024-02-13 LAB
ANION GAP SERPL CALC-SCNC: 14 MMOL/L (ref 8–16)
BASOPHILS # BLD AUTO: 0.01 K/UL (ref 0–0.2)
BASOPHILS NFR BLD: 0.2 % (ref 0–1.9)
BUN SERPL-MCNC: 22 MG/DL (ref 8–23)
CALCIUM SERPL-MCNC: 9.5 MG/DL (ref 8.7–10.5)
CATH EF QUANTITATIVE: 50 %
CHLORIDE SERPL-SCNC: 98 MMOL/L (ref 95–110)
CO2 SERPL-SCNC: 25 MMOL/L (ref 23–29)
CREAT SERPL-MCNC: 4.7 MG/DL (ref 0.5–1.4)
DIFFERENTIAL METHOD BLD: ABNORMAL
EOSINOPHIL # BLD AUTO: 0 K/UL (ref 0–0.5)
EOSINOPHIL NFR BLD: 0.8 % (ref 0–8)
ERYTHROCYTE [DISTWIDTH] IN BLOOD BY AUTOMATED COUNT: 14 % (ref 11.5–14.5)
EST. GFR  (NO RACE VARIABLE): 9 ML/MIN/1.73 M^2
GLUCOSE SERPL-MCNC: 110 MG/DL (ref 70–110)
HCT VFR BLD AUTO: 34.7 % (ref 37–48.5)
HGB BLD-MCNC: 11.3 G/DL (ref 12–16)
IMM GRANULOCYTES # BLD AUTO: 0.02 K/UL (ref 0–0.04)
IMM GRANULOCYTES NFR BLD AUTO: 0.4 % (ref 0–0.5)
LYMPHOCYTES # BLD AUTO: 0.7 K/UL (ref 1–4.8)
LYMPHOCYTES NFR BLD: 13.9 % (ref 18–48)
MCH RBC QN AUTO: 31.7 PG (ref 27–31)
MCHC RBC AUTO-ENTMCNC: 32.6 G/DL (ref 32–36)
MCV RBC AUTO: 97 FL (ref 82–98)
MONOCYTES # BLD AUTO: 0.5 K/UL (ref 0.3–1)
MONOCYTES NFR BLD: 9.1 % (ref 4–15)
NEUTROPHILS # BLD AUTO: 3.9 K/UL (ref 1.8–7.7)
NEUTROPHILS NFR BLD: 75.6 % (ref 38–73)
NRBC BLD-RTO: 0 /100 WBC
PLATELET # BLD AUTO: 192 K/UL (ref 150–450)
PMV BLD AUTO: 8.9 FL (ref 9.2–12.9)
POTASSIUM SERPL-SCNC: 3.6 MMOL/L (ref 3.5–5.1)
RBC # BLD AUTO: 3.57 M/UL (ref 4–5.4)
SODIUM SERPL-SCNC: 137 MMOL/L (ref 136–145)
WBC # BLD AUTO: 5.19 K/UL (ref 3.9–12.7)

## 2024-02-13 PROCEDURE — 99153 MOD SED SAME PHYS/QHP EA: CPT | Mod: HCNC | Performed by: INTERNAL MEDICINE

## 2024-02-13 PROCEDURE — C1894 INTRO/SHEATH, NON-LASER: HCPCS | Mod: HCNC | Performed by: INTERNAL MEDICINE

## 2024-02-13 PROCEDURE — 80048 BASIC METABOLIC PNL TOTAL CA: CPT | Mod: HCNC | Performed by: INTERNAL MEDICINE

## 2024-02-13 PROCEDURE — 25000003 PHARM REV CODE 250: Mod: HCNC | Performed by: INTERNAL MEDICINE

## 2024-02-13 PROCEDURE — 99152 MOD SED SAME PHYS/QHP 5/>YRS: CPT | Mod: ,,, | Performed by: INTERNAL MEDICINE

## 2024-02-13 PROCEDURE — C1751 CATH, INF, PER/CENT/MIDLINE: HCPCS | Mod: HCNC | Performed by: INTERNAL MEDICINE

## 2024-02-13 PROCEDURE — 93461 R&L HRT ART/VENTRICLE ANGIO: CPT | Mod: HCNC | Performed by: INTERNAL MEDICINE

## 2024-02-13 PROCEDURE — 75630 X-RAY AORTA LEG ARTERIES: CPT | Mod: 59,HCNC | Performed by: INTERNAL MEDICINE

## 2024-02-13 PROCEDURE — 99152 MOD SED SAME PHYS/QHP 5/>YRS: CPT | Mod: HCNC | Performed by: INTERNAL MEDICINE

## 2024-02-13 PROCEDURE — 85025 COMPLETE CBC W/AUTO DIFF WBC: CPT | Mod: HCNC | Performed by: INTERNAL MEDICINE

## 2024-02-13 PROCEDURE — 27201423 OPTIME MED/SURG SUP & DEVICES STERILE SUPPLY: Mod: HCNC | Performed by: INTERNAL MEDICINE

## 2024-02-13 PROCEDURE — C1760 CLOSURE DEV, VASC: HCPCS | Mod: HCNC | Performed by: INTERNAL MEDICINE

## 2024-02-13 PROCEDURE — 75630 X-RAY AORTA LEG ARTERIES: CPT | Mod: 26,59,, | Performed by: INTERNAL MEDICINE

## 2024-02-13 PROCEDURE — 93461 R&L HRT ART/VENTRICLE ANGIO: CPT | Mod: 26,HCNC,, | Performed by: INTERNAL MEDICINE

## 2024-02-13 PROCEDURE — C1769 GUIDE WIRE: HCPCS | Mod: HCNC | Performed by: INTERNAL MEDICINE

## 2024-02-13 PROCEDURE — 63600175 PHARM REV CODE 636 W HCPCS: Mod: HCNC | Performed by: INTERNAL MEDICINE

## 2024-02-13 RX ORDER — ACETAMINOPHEN 325 MG/1
650 TABLET ORAL EVERY 4 HOURS PRN
Status: DISCONTINUED | OUTPATIENT
Start: 2024-02-13 | End: 2024-02-13 | Stop reason: HOSPADM

## 2024-02-13 RX ORDER — CEFAZOLIN SODIUM 1 G/3ML
INJECTION, POWDER, FOR SOLUTION INTRAMUSCULAR; INTRAVENOUS
Status: DISCONTINUED | OUTPATIENT
Start: 2024-02-13 | End: 2024-02-13 | Stop reason: HOSPADM

## 2024-02-13 RX ORDER — SODIUM CHLORIDE 9 MG/ML
INJECTION, SOLUTION INTRAVENOUS CONTINUOUS
Status: ACTIVE | OUTPATIENT
Start: 2024-02-13 | End: 2024-02-13

## 2024-02-13 RX ORDER — ONDANSETRON 8 MG/1
8 TABLET, ORALLY DISINTEGRATING ORAL EVERY 8 HOURS PRN
Status: DISCONTINUED | OUTPATIENT
Start: 2024-02-13 | End: 2024-02-13 | Stop reason: HOSPADM

## 2024-02-13 RX ORDER — SODIUM CHLORIDE 9 MG/ML
INJECTION, SOLUTION INTRAVENOUS
Status: DISCONTINUED | OUTPATIENT
Start: 2024-02-13 | End: 2024-02-13 | Stop reason: HOSPADM

## 2024-02-13 RX ORDER — NAPROXEN SODIUM 220 MG/1
81 TABLET, FILM COATED ORAL ONCE
Status: COMPLETED | OUTPATIENT
Start: 2024-02-13 | End: 2024-02-13

## 2024-02-13 RX ORDER — MIDAZOLAM HYDROCHLORIDE 1 MG/ML
INJECTION, SOLUTION INTRAMUSCULAR; INTRAVENOUS
Status: DISCONTINUED | OUTPATIENT
Start: 2024-02-13 | End: 2024-02-13 | Stop reason: HOSPADM

## 2024-02-13 RX ORDER — LIDOCAINE HYDROCHLORIDE 20 MG/ML
INJECTION, SOLUTION EPIDURAL; INFILTRATION; INTRACAUDAL; PERINEURAL
Status: DISCONTINUED | OUTPATIENT
Start: 2024-02-13 | End: 2024-02-13 | Stop reason: HOSPADM

## 2024-02-13 RX ORDER — DIPHENHYDRAMINE HCL 50 MG
50 CAPSULE ORAL ONCE
Status: COMPLETED | OUTPATIENT
Start: 2024-02-13 | End: 2024-02-13

## 2024-02-13 RX ORDER — SODIUM CHLORIDE 0.9 % (FLUSH) 0.9 %
10 SYRINGE (ML) INJECTION
Status: DISCONTINUED | OUTPATIENT
Start: 2024-02-13 | End: 2024-02-13 | Stop reason: HOSPADM

## 2024-02-13 RX ORDER — FENTANYL CITRATE 50 UG/ML
INJECTION, SOLUTION INTRAMUSCULAR; INTRAVENOUS
Status: DISCONTINUED | OUTPATIENT
Start: 2024-02-13 | End: 2024-02-13 | Stop reason: HOSPADM

## 2024-02-13 RX ADMIN — ASPIRIN 81 MG CHEWABLE TABLET 81 MG: 81 TABLET CHEWABLE at 07:02

## 2024-02-13 RX ADMIN — SODIUM CHLORIDE: 9 INJECTION, SOLUTION INTRAVENOUS at 07:02

## 2024-02-13 RX ADMIN — DIPHENHYDRAMINE HYDROCHLORIDE 50 MG: 50 CAPSULE ORAL at 07:02

## 2024-02-13 NOTE — Clinical Note
The PA catheter was repositioned to the main pulmonary artery. Hemodynamics were performed. Cardiac output was obtained. 3.7

## 2024-02-13 NOTE — INTERVAL H&P NOTE
The patient has been examined and the H&P has been reviewed:    I concur with the findings and no changes have occurred since H&P was written.  Continue to have shortness of breath with fluctuation in her blood pressure on dialysis with abnormal cardiolite will plan on left heart cath and right heart cath due to severe pulmonary htn  Procedure risks, benefits and alternative options discussed and understood by patient/family.  I have explained the risks, benefits , and alternatives of the procedure in detail.the patient voices understanding and all questions have been answered.the patient agrees to proceed as planned.           Active Hospital Problems    Diagnosis  POA    *Abnormal nuclear stress test [R94.39]  Yes    Chronic heart failure with preserved ejection fraction [I50.32]  Yes    Diastolic dysfunction with acute on chronic heart failure [I50.33]  Yes    Type 2 diabetes mellitus with diabetic peripheral angiopathy without gangrene, with long-term current use of insulin [E11.51, Z79.4]  Not Applicable    S/P CABG (coronary artery bypass graft) [Z95.1]  Not Applicable    Chronic combined systolic and diastolic heart failure [I50.42]  Yes    Elevated troponin [R79.89]  Yes    DM type 2 with diabetic peripheral neuropathy [E11.42]  Yes    CAD (coronary artery disease) [I25.10]  Yes    Hyperlipidemia associated with type 2 diabetes mellitus [E11.69, E78.5]  Yes    Type 2 diabetes mellitus with circulatory disorder, with long-term current use of insulin [E11.59, Z79.4]  Not Applicable    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, with long-term current use of insulin [E11.3553, Z79.4]  Not Applicable    ESRD (end stage renal disease) [N18.6]  Yes     Hx of unctontrolled HTN and DMII : Admission records reviewed from [facility] in Jan 2014 and March 2014 ; Creatinine was 1.4 in Jan 2014 and had proteinuria c/w HTN and DMII ; since then progressive decline in GFR with UTI from E.Coli ;  USD in 9/2014 show chronic Medical Disease         Resolved Hospital Problems   No resolved problems to display.

## 2024-02-13 NOTE — Clinical Note
The DP pulses were 1+ bilaterally. The PT pulses were 1+ bilaterally. The radial pulses were +2 bilaterally. Detail Level: Detailed Otc Regimen: Apply sunscreen daiy  100spf

## 2024-02-13 NOTE — Clinical Note
The catheter was inserted into the left subclavian artery. An angiography was performed of the LIMA to LAD. [facility]

## 2024-02-13 NOTE — OP NOTE
INPATIENT Operative Note         SUMMARY     Surgery Date: 2/13/2024     Surgeon(s) and Role:     * Michelle Holman MD - Primary    ASSISTANT:none    Pre-op Diagnosis:  Coronary artery disease involving native coronary artery of native heart, unspecified whether angina present [I25.10]  Hx of CABG [Z95.1]  Abnormal nuclear stress test [R94.39]  SOB (shortness of breath) [R06.02]  CAD S/P percutaneous coronary angioplasty [I25.10, Z98.61]      Post-op Diagnosis:  Coronary artery disease involving native coronary artery of native heart, unspecified whether angina present [I25.10]  Hx of CABG [Z95.1]  Abnormal nuclear stress test [R94.39]  SOB (shortness of breath) [R06.02]  CAD S/P percutaneous coronary angioplasty [I25.10, Z98.61]    Procedure(s) (LRB):  Left heart cath (Left)  INSERTION, CATHETER, RIGHT HEART (Right)  Bypass graft study  ARTERIOGRAM, SUBCLAVIAN  Angiogram, Lower Arterial, Unilateral    COMPLICATION:none    Anesthesia: RN IV Sedation    Findings/Key Components:  Normal filling pressures  Severe pulmonary htn    Moderate mitral regurgitation  Occluded rca   Lad to rca collaterals.  Lad mid calcified 90%   Patent lima with oscillating flow   Patent schg to om   Ostial l;cx 99%  Lvef 50%   Lvedp normal  Non obstructive iliac disease.   Estimated Blood Loss: < 50 ML.         SPECIMEN: NONE    Devices/Prostetics: None    PLAN: routine post cath   Refer to pulmonary   Consider olivier to evaluate mr.

## 2024-02-13 NOTE — NURSING
VSS. Ambulating in room with SOB noted as upon admission this morning. Respirations easy  once at rest. Right groin arterial  site without bleeding or hematoma noted.

## 2024-02-13 NOTE — Clinical Note
The catheter was repositioned into the left subclavian artery. An angiography was performed of the L subclavian artery.

## 2024-02-13 NOTE — Clinical Note
An angiography was performed of the distal right common iliac arteries via hand injection with 10 mL of contrast

## 2024-02-14 NOTE — DISCHARGE SUMMARY
[facility] - Cath Lab (Hospital)  Discharge Note  Short Stay    Procedure(s) (LRB):  Left heart cath (Left)  INSERTION, CATHETER, RIGHT HEART (Right)  Bypass graft study  ARTERIOGRAM, SUBCLAVIAN  Angiogram, Lower Arterial, Unilateral      OUTCOME: Patient tolerated treatment/procedure well without complication and is now ready for discharge.    DISPOSITION: Home or Self Care    FINAL DIAGNOSIS:  Abnormal nuclear stress test    FOLLOWUP: In clinic    DISCHARGE INSTRUCTIONS:    Discharge Procedure Orders   Diet general     Call MD for:  temperature >100.4     Call MD for:  persistent nausea and vomiting     Call MD for:  severe uncontrolled pain     Call MD for:  difficulty breathing, headache or visual disturbances     Call MD for:  redness, tenderness, or signs of infection (pain, swelling, redness, odor or green/yellow discharge around incision site)     Call MD for:  hives     Call MD for:  persistent dizziness or light-headedness     Call MD for:  extreme fatigue        TIME SPENT ON DISCHARGE:   35  minutes

## 2024-02-19 ENCOUNTER — OFFICE VISIT (OUTPATIENT)
Dept: PULMONOLOGY | Facility: CLINIC | Age: 79
End: 2024-02-19
Payer: MEDICARE

## 2024-02-19 ENCOUNTER — TELEPHONE (OUTPATIENT)
Dept: PULMONOLOGY | Facility: CLINIC | Age: 79
End: 2024-02-19
Payer: MEDICARE

## 2024-02-19 VITALS
DIASTOLIC BLOOD PRESSURE: 68 MMHG | RESPIRATION RATE: 18 BRPM | SYSTOLIC BLOOD PRESSURE: 138 MMHG | OXYGEN SATURATION: 99 % | HEIGHT: 65 IN | BODY MASS INDEX: 20.97 KG/M2 | WEIGHT: 125.88 LBS | HEART RATE: 75 BPM

## 2024-02-19 DIAGNOSIS — I27.20 PULMONARY HTN: Primary | ICD-10-CM

## 2024-02-19 DIAGNOSIS — G47.30 SLEEP-DISORDERED BREATHING: ICD-10-CM

## 2024-02-19 DIAGNOSIS — I50.32 DIASTOLIC DYSFUNCTION WITH CHRONIC HEART FAILURE: ICD-10-CM

## 2024-02-19 DIAGNOSIS — N18.6 ESRD (END STAGE RENAL DISEASE): ICD-10-CM

## 2024-02-19 PROCEDURE — 1159F MED LIST DOCD IN RCRD: CPT | Mod: HCNC,CPTII,S$GLB, | Performed by: PHYSICIAN ASSISTANT

## 2024-02-19 PROCEDURE — 1160F RVW MEDS BY RX/DR IN RCRD: CPT | Mod: HCNC,CPTII,S$GLB, | Performed by: PHYSICIAN ASSISTANT

## 2024-02-19 PROCEDURE — 99999 PR PBB SHADOW E&M-EST. PATIENT-LVL IV: CPT | Mod: PBBFAC,HCNC,, | Performed by: PHYSICIAN ASSISTANT

## 2024-02-19 PROCEDURE — 99214 OFFICE O/P EST MOD 30 MIN: CPT | Mod: HCNC,S$GLB,, | Performed by: PHYSICIAN ASSISTANT

## 2024-02-19 PROCEDURE — 3075F SYST BP GE 130 - 139MM HG: CPT | Mod: HCNC,CPTII,S$GLB, | Performed by: PHYSICIAN ASSISTANT

## 2024-02-19 PROCEDURE — 1157F ADVNC CARE PLAN IN RCRD: CPT | Mod: HCNC,CPTII,S$GLB, | Performed by: PHYSICIAN ASSISTANT

## 2024-02-19 PROCEDURE — 3078F DIAST BP <80 MM HG: CPT | Mod: HCNC,CPTII,S$GLB, | Performed by: PHYSICIAN ASSISTANT

## 2024-02-19 PROCEDURE — 1101F PT FALLS ASSESS-DOCD LE1/YR: CPT | Mod: HCNC,CPTII,S$GLB, | Performed by: PHYSICIAN ASSISTANT

## 2024-02-19 PROCEDURE — 3288F FALL RISK ASSESSMENT DOCD: CPT | Mod: HCNC,CPTII,S$GLB, | Performed by: PHYSICIAN ASSISTANT

## 2024-02-19 NOTE — PROGRESS NOTES
Subjective:       Patient ID: Avril Monzon is a 79 y.o. female.    Chief Complaint: Shortness of Breath      2/21/2024  Here for SOB follow up  New patient to me  Has seen Dr. Bingham and Dr. Stephens in pulmonary  Referred back to pulmonary by her cardiologist  She has pulmonary HTN, last Echo PA pressure 88  No previous sleep study or PFT  Has chronic SOB which she says is stable, NYHA class III  Has Dry cough, tickle in throat, clearing throat  Dialysis M, W, F  Wakes up frequently throughout the night, snores  No sleep aids  Sleeps with 2+ pillows  Has daytime fatigue    BP Readings from Last 3 Encounters:   02/19/24 138/68   02/13/24 128/64   01/30/24 (!) 148/60     Snoring / Sleep:     [place] Questionnaire (validated LILIANA screening questionnaire)    yes -- Snoring/apnea    yes -- Fatigue    Body mass index is 20.95 kg/m².  (>25 is overweight, >30 is obese)    Blood Pressure = Hypertension  (PreHTN 120-139/80-89, Stg1 140-159/90-99, Stg2 >160/>100)  [place] = 3 of three LILIANA categories are positive (high risk is 2-3 positive categories)     [place] Sleepiness Scale TOTAL =   18  (validated sleepiness questionnaire with a higher score indicating greater sleepiness; range 0-24)      STOP-Bang Questionnaire (validated LILIANA screening questionnaire)  Negative unless checked off.  [x] Snoring    [x]  Tired/Fatigued/Sleepy  [] Obstruction (apneas/choking)  [x] Pressure (HTN)  [x] BMI >35  [x] Age >50  [] Neck >40 cm  [] Gender male   STOP-Bang = 5 (low risk 0-2,high risk 3-8)        Immunization History   Administered Date(s) Administered    COVID-19 MRNA, LN-S PF (MODERNA HALF 0.25 ML DOSE) 12/31/2021    COVID-19, MRNA, LN-S, PF (MODERNA FULL 0.5 ML DOSE) 01/22/2021, 02/23/2021    Hepatitis B, Adult 11/23/2020, 12/28/2020, 01/25/2021, 05/24/2021    Influenza 09/30/2020, 09/05/2022    Influenza (FLUAD) - Quadrivalent - Adjuvanted - PF *Preferred* (65+) 10/13/2020, 09/13/2022, 09/22/2023    Influenza - High Dose - PF (65  years and older) 01/13/2016, 11/14/2016, 11/01/2017, 11/05/2018, 09/11/2019, 11/12/2021    PPD Test 10/26/2020, 11/16/2020, 11/12/2021, 11/14/2022    Pneumococcal 10/27/2020    Pneumococcal Conjugate - 13 Valent 01/13/2016, 12/04/2018    Pneumococcal Conjugate - 20 Valent 09/13/2022    Pneumococcal Polysaccharide - 23 Valent 06/02/2014, 10/29/2020    Tdap 06/25/2012, 12/12/2021      Tobacco Use: Medium Risk (2/19/2024)    Patient History     Smoking Tobacco Use: Former     Smokeless Tobacco Use: Former     Passive Exposure: Not on file      Past Medical History:   Diagnosis Date    Abnormal nuclear stress test 02/12/2024    Acute hypoxemic respiratory failure 11/26/2018    Anemia     Arthritis     back, hand, knees    Back pain     Cataract     CKD stage G4/A3, GFR 15 - 29 and albumin creatinine ratio >300 mg/g     GFR 40% Jan 2014 and 33% ub 3/2014 ([facility])    Coronary artery disease involving native coronary artery of native heart 11/30/2018    Diabetic retinopathy     DM (diabetes mellitus) type II controlled, neurological manifestation     Exudative age-related macular degeneration of left eye with active choroidal neovascularization 02/05/2019    GERD (gastroesophageal reflux disease)     Glaucoma     Heart failure     Hyperlipidemia     Hypertension     Non-ST elevation MI (NSTEMI) 11/28/2018    NSTEMI (non-ST elevated myocardial infarction) 11/27/2018    Peripheral neuropathy     Polyneuropathy     Proteinuria     >6 mo     Seizures     BRG 1/2014 -dick CT NRI showed small vessel    Skin ulcer of toe of right foot with fat layer exposed 10/14/2022    Stroke 2012,2014    Tobacco dependence     Type 2 diabetes with peripheral circulatory disorder, controlled       Current Outpatient Medications on File Prior to Visit   Medication Sig Dispense Refill    aspirin (ECOTRIN) 81 MG EC tablet Take 1 tablet (81 mg total) by mouth once daily. 90 tablet 6    atropine 1% (ISOPTO ATROPINE) 1 % Drop Place 1 drop into the left  eye 3 (three) times daily.      blood sugar diagnostic (TRUE METRIX GLUCOSE TEST STRIP) Strp 1 strip by Misc.(Non-Drug; Combo Route) route 3 (three) times daily. 100 each 11    busPIRone (BUSPAR) 5 MG Tab Take 1 tablet (5 mg total) by mouth 2 (two) times daily as needed (anxiety). 60 tablet 2    cyanocobalamin (VITAMIN B-12) 1000 MCG tablet Take 100 mcg by mouth once daily.      folic acid (FOLVITE) 1 MG tablet Take 1 mg by mouth once daily.      insulin aspart protamine-insulin aspart (NOVOLOG MIX 70-30FLEXPEN U-100) 100 unit/mL (70-30) InPn pen Inject 24 units into skin before breakfast, 10 units before dinner. On dialysis days, give 14 units before dinner. 15 mL 3    LIDOcaine (LIDODERM) 5 % Place 1 patch onto the skin once daily. 15 patch 0    loratadine (CLARITIN) 10 mg tablet Take 1 tablet (10 mg total) by mouth once daily. 30 tablet 3    metoprolol tartrate (LOPRESSOR) 50 MG tablet Take 1 tablet (50 mg total) by mouth 2 (two) times daily. 180 tablet 11    pantoprazole (PROTONIX) 40 MG tablet Take 1 tablet (40 mg total) by mouth once daily. 90 tablet 3    prednisoLONE acetate (PRED FORTE) 1 % DrpS Place 1 drop into both eyes 4 (four) times daily.      rosuvastatin (CRESTOR) 20 MG tablet Take 1 tablet (20 mg total) by mouth once daily. 90 tablet 3    sevelamer carbonate (RENVELA) 800 mg Tab Take 3 tablets (2,400 mg) by mouth three times a day with meals and 2 tablets (1,600 mg) by mouth with snacks 1170 tablet 3    telmisartan (MICARDIS) 20 MG Tab Take 1 tablet (20 mg total) by mouth once daily. 90 tablet 3    traMADoL (ULTRAM) 50 mg tablet Take 1 tablet (50 mg total) by mouth every 6 (six) hours as needed for Pain. 30 each 0    azelastine (ASTELIN) 137 mcg (0.1 %) nasal spray 1 spray (137 mcg total) by Nasal route 2 (two) times daily. 30 mL 0    [DISCONTINUED] isosorbide-hydrALAZINE 20-37.5 mg (BIDIL) 20-37.5 mg Tab Take 1 tablet by mouth 3 (three) times daily. 90 tablet 1     No current facility-administered  "medications on file prior to visit.        Review of Systems   Constitutional:  Positive for fatigue. Negative for fever and activity change.   HENT:  Positive for postnasal drip.    Respiratory:  Positive for snoring, cough, shortness of breath, dyspnea on extertion and somnolence. Negative for sputum production and wheezing.    Cardiovascular:  Negative for chest pain and leg swelling.   Musculoskeletal:  Positive for arthralgias and gait problem.   Gastrointestinal:  Negative for nausea and vomiting.   Neurological:  Negative for headaches.   Psychiatric/Behavioral:  Positive for sleep disturbance.    All other systems reviewed and are negative.      Objective:       Vitals:    02/19/24 1521   BP: 138/68   Pulse: 75   Resp: 18   SpO2: 99%   Weight: 57.1 kg (125 lb 14.1 oz)   Height: 5' 5" (1.651 m)       Physical Exam   Constitutional: She is oriented to person, place, and time. She appears well-developed and well-nourished. No distress.   HENT:   Head: Normocephalic.   Mouth/Throat: Oropharynx is clear and moist.   Cardiovascular: Normal rate and regular rhythm.   Pulmonary/Chest: Effort normal. No respiratory distress. She has decreased breath sounds. She has no wheezes. She has no rhonchi. She has no rales.   Musculoskeletal:         General: No edema.      Cervical back: Normal range of motion and neck supple.   Neurological: She is alert and oriented to person, place, and time. Gait abnormal.   Skin: Skin is warm and dry.   Psychiatric: She has a normal mood and affect.   Vitals reviewed.    Personal Diagnostic Review    Cardiac catheterization    The Prox RCA to Mid RCA lesion was 100% stenosed.    The Mid LAD lesion was 90% stenosed.    The Ost Cx lesion was 99% stenosed.    The ejection fraction was calculated to be 50%.    The pre-procedure left ventricular end diastolic pressure was 18.    The post-procedure left ventricular end diastolic pressure was 20.    The estimated blood loss was none.    There " was three vessel coronary artery disease.    The filling pressures on the right and left were normal. Pulmonary   hypertension was moderate.    There was no aortic valve stenosis.    There was moderate (3+) mitral regurgitation.    The procedure log was documented by No documenter listed and verified by   Roya Holman MD.    Date: 2/13/2024  Time: 9:12 AM    EXAMINATION:  XR CHEST AP PORTABLE     CLINICAL HISTORY:  shortness of breath;     TECHNIQUE:  Single frontal view of the chest was performed.     COMPARISON:  09/07/2023     FINDINGS:  Midline sternotomy wires.  The cardiac silhouette is enlarged but unchanged.  The mediastinum is unremarkable.  Developing fluid within the minor fissure.     Blunting of the left costophrenic angle suggesting a developing pleural effusion.  Bilateral perihilar indistinctness along with developing interstitial and ground-glass opacity suggesting pulmonary venous hypertension.     Impression:     Cardiomegaly     Findings suggesting developing pulmonary venous hypertension and a developing left pleural effusion        Electronically signed by: Noman Rg  Date:                                            11/13/2023  Time:                                           15:53        Assessment/Plan:       Problem List Items Addressed This Visit          Cardiac/Vascular    Pulmonary HTN - Primary    Relevant Orders    Complete PFT with bronchodilator    Stress test, pulmonary    PFT - related Arterial Blood Gas    Polysomnogram (CPAP will be added if patient meets diagnostic criteria.)    ALPHA 1 ANTITRYPSIN PHENOTYPE    Alpha-1-Antitrypsin    X-Ray Chest PA And Lateral    Diastolic dysfunction with chronic heart failure       Renal/    ESRD (end stage renal disease)     Other Visit Diagnoses       Sleep-disordered breathing        Relevant Orders    Polysomnogram (CPAP will be added if patient meets diagnostic criteria.)          Follow up next available with pulmonologist for  pulmonary HTN workup, after testing.    Discussed diagnosis, its evaluation, treatment and usual course. All questions answered.    Patient verbalized understanding of plan and left in no acute distress    Thank you for the courtesy of participating in the care of this patient    Elizabeth G Blough, PA-C [facility]

## 2024-02-19 NOTE — TELEPHONE ENCOUNTER
Returned call back. Spoke with patients daughter regarding appt. Patients daughter stated her and her mom are waiting for transportation. Daughter stated  went to wrong location and they are en route to appointment. Patients daughter stated they should arrive to appointment around 3 pm. Staff informed provider of this----- Message from Jorge Marrufo sent at 2/19/2024  2:32 PM CST -----  Regarding: PT CALL BACK  Name of Who is Calling:Pt daughter         What is the request in detail: Requesting callback in regards to appt today please advise           Can the clinic reply by [facility]: no         What Number to Call Back if not in [employer]:Telephone Information:  Mobile        [phone]

## 2024-02-21 PROBLEM — I50.32 DIASTOLIC DYSFUNCTION WITH CHRONIC HEART FAILURE: Status: ACTIVE | Noted: 2023-11-14

## 2024-02-29 ENCOUNTER — OFFICE VISIT (OUTPATIENT)
Dept: PODIATRY | Facility: CLINIC | Age: 79
End: 2024-02-29
Payer: MEDICARE

## 2024-02-29 ENCOUNTER — APPOINTMENT (OUTPATIENT)
Dept: RADIOLOGY | Facility: HOSPITAL | Age: 79
End: 2024-02-29
Attending: INTERNAL MEDICINE
Payer: MEDICARE

## 2024-02-29 ENCOUNTER — OFFICE VISIT (OUTPATIENT)
Dept: CARDIOLOGY | Facility: CLINIC | Age: 79
End: 2024-02-29
Payer: MEDICARE

## 2024-02-29 VITALS
RESPIRATION RATE: 18 BRPM | DIASTOLIC BLOOD PRESSURE: 64 MMHG | TEMPERATURE: 98 F | DIASTOLIC BLOOD PRESSURE: 60 MMHG | HEART RATE: 62 BPM | OXYGEN SATURATION: 100 % | BODY MASS INDEX: 20.95 KG/M2 | HEART RATE: 77 BPM | BODY MASS INDEX: 21.38 KG/M2 | SYSTOLIC BLOOD PRESSURE: 128 MMHG | WEIGHT: 128.31 LBS | SYSTOLIC BLOOD PRESSURE: 134 MMHG | OXYGEN SATURATION: 97 % | HEIGHT: 65 IN | HEIGHT: 65 IN

## 2024-02-29 DIAGNOSIS — E11.3553 TYPE 2 DIABETES MELLITUS WITH STABLE PROLIFERATIVE RETINOPATHY OF BOTH EYES, WITH LONG-TERM CURRENT USE OF INSULIN: Primary | ICD-10-CM

## 2024-02-29 DIAGNOSIS — R94.39 ABNORMAL NUCLEAR STRESS TEST: ICD-10-CM

## 2024-02-29 DIAGNOSIS — E11.59 HYPERTENSION ASSOCIATED WITH DIABETES: ICD-10-CM

## 2024-02-29 DIAGNOSIS — J90 PLEURAL EFFUSION: ICD-10-CM

## 2024-02-29 DIAGNOSIS — E11.69 HYPERLIPIDEMIA ASSOCIATED WITH TYPE 2 DIABETES MELLITUS: ICD-10-CM

## 2024-02-29 DIAGNOSIS — E55.9 VITAMIN D DEFICIENCY: ICD-10-CM

## 2024-02-29 DIAGNOSIS — Z79.4 TYPE 2 DIABETES MELLITUS WITH STABLE PROLIFERATIVE RETINOPATHY OF BOTH EYES, WITH LONG-TERM CURRENT USE OF INSULIN: Primary | ICD-10-CM

## 2024-02-29 DIAGNOSIS — E11.3553 TYPE 2 DIABETES MELLITUS WITH STABLE PROLIFERATIVE RETINOPATHY OF BOTH EYES, WITH LONG-TERM CURRENT USE OF INSULIN: ICD-10-CM

## 2024-02-29 DIAGNOSIS — I65.23 BILATERAL CAROTID ARTERY STENOSIS: ICD-10-CM

## 2024-02-29 DIAGNOSIS — Z79.4 TYPE 2 DIABETES MELLITUS WITH STABLE PROLIFERATIVE RETINOPATHY OF BOTH EYES, WITH LONG-TERM CURRENT USE OF INSULIN: ICD-10-CM

## 2024-02-29 DIAGNOSIS — J90 BILATERAL PLEURAL EFFUSION: ICD-10-CM

## 2024-02-29 DIAGNOSIS — L60.3 ONYCHODYSTROPHY: ICD-10-CM

## 2024-02-29 DIAGNOSIS — Z79.4 CONTROLLED TYPE 2 DIABETES MELLITUS WITH DIABETIC POLYNEUROPATHY, WITH LONG-TERM CURRENT USE OF INSULIN: ICD-10-CM

## 2024-02-29 DIAGNOSIS — Z99.2 ANEMIA IN CHRONIC KIDNEY DISEASE, ON CHRONIC DIALYSIS: ICD-10-CM

## 2024-02-29 DIAGNOSIS — E11.51 TYPE 2 DIABETES MELLITUS WITH DIABETIC PERIPHERAL ANGIOPATHY WITHOUT GANGRENE, WITH LONG-TERM CURRENT USE OF INSULIN: ICD-10-CM

## 2024-02-29 DIAGNOSIS — I15.2 HYPERTENSION ASSOCIATED WITH DIABETES: ICD-10-CM

## 2024-02-29 DIAGNOSIS — I27.20 PULMONARY HTN: ICD-10-CM

## 2024-02-29 DIAGNOSIS — D50.0 IRON DEFICIENCY ANEMIA DUE TO CHRONIC BLOOD LOSS: ICD-10-CM

## 2024-02-29 DIAGNOSIS — Z78.0 ASYMPTOMATIC POSTMENOPAUSAL STATE: ICD-10-CM

## 2024-02-29 DIAGNOSIS — R06.02 SOB (SHORTNESS OF BREATH): ICD-10-CM

## 2024-02-29 DIAGNOSIS — I50.32 CHRONIC HEART FAILURE WITH PRESERVED EJECTION FRACTION: ICD-10-CM

## 2024-02-29 DIAGNOSIS — Z95.1 S/P CABG (CORONARY ARTERY BYPASS GRAFT): ICD-10-CM

## 2024-02-29 DIAGNOSIS — Z99.2 END STAGE RENAL DISEASE ON DIALYSIS: ICD-10-CM

## 2024-02-29 DIAGNOSIS — I36.1 NON-RHEUMATIC TRICUSPID VALVE INSUFFICIENCY: ICD-10-CM

## 2024-02-29 DIAGNOSIS — I34.0 NON-RHEUMATIC MITRAL REGURGITATION: ICD-10-CM

## 2024-02-29 DIAGNOSIS — N18.6 ANEMIA IN CHRONIC KIDNEY DISEASE, ON CHRONIC DIALYSIS: ICD-10-CM

## 2024-02-29 DIAGNOSIS — I70.0 AORTIC ATHEROSCLEROSIS: ICD-10-CM

## 2024-02-29 DIAGNOSIS — D63.1 ANEMIA IN CHRONIC KIDNEY DISEASE, ON CHRONIC DIALYSIS: ICD-10-CM

## 2024-02-29 DIAGNOSIS — M46.96 UNSPECIFIED INFLAMMATORY SPONDYLOPATHY, LUMBAR REGION: ICD-10-CM

## 2024-02-29 DIAGNOSIS — R80.9 PROTEINURIA, UNSPECIFIED TYPE: ICD-10-CM

## 2024-02-29 DIAGNOSIS — I25.118 CORONARY ARTERY DISEASE OF NATIVE ARTERY OF NATIVE HEART WITH STABLE ANGINA PECTORIS: ICD-10-CM

## 2024-02-29 DIAGNOSIS — E11.42 DM TYPE 2 WITH DIABETIC PERIPHERAL NEUROPATHY: ICD-10-CM

## 2024-02-29 DIAGNOSIS — L97.512 SKIN ULCER OF TOE OF RIGHT FOOT WITH FAT LAYER EXPOSED: ICD-10-CM

## 2024-02-29 DIAGNOSIS — N18.6 END STAGE RENAL DISEASE ON DIALYSIS: ICD-10-CM

## 2024-02-29 DIAGNOSIS — E11.42 CONTROLLED TYPE 2 DIABETES MELLITUS WITH DIABETIC POLYNEUROPATHY, WITH LONG-TERM CURRENT USE OF INSULIN: ICD-10-CM

## 2024-02-29 DIAGNOSIS — I50.42 CHRONIC COMBINED SYSTOLIC AND DIASTOLIC HEART FAILURE: Primary | ICD-10-CM

## 2024-02-29 DIAGNOSIS — E78.5 HYPERLIPIDEMIA ASSOCIATED WITH TYPE 2 DIABETES MELLITUS: ICD-10-CM

## 2024-02-29 DIAGNOSIS — Z79.4 TYPE 2 DIABETES MELLITUS WITH DIABETIC PERIPHERAL ANGIOPATHY WITHOUT GANGRENE, WITH LONG-TERM CURRENT USE OF INSULIN: ICD-10-CM

## 2024-02-29 PROCEDURE — 11721 DEBRIDE NAIL 6 OR MORE: CPT | Mod: Q9,HCNC,S$GLB, | Performed by: PODIATRIST

## 2024-02-29 PROCEDURE — 3288F FALL RISK ASSESSMENT DOCD: CPT | Mod: HCNC,CPTII,S$GLB, | Performed by: INTERNAL MEDICINE

## 2024-02-29 PROCEDURE — 3075F SYST BP GE 130 - 139MM HG: CPT | Mod: HCNC,CPTII,S$GLB, | Performed by: INTERNAL MEDICINE

## 2024-02-29 PROCEDURE — 1159F MED LIST DOCD IN RCRD: CPT | Mod: HCNC,CPTII,S$GLB, | Performed by: INTERNAL MEDICINE

## 2024-02-29 PROCEDURE — 99999 PR PBB SHADOW E&M-EST. PATIENT-LVL III: CPT | Mod: PBBFAC,HCNC,, | Performed by: PODIATRIST

## 2024-02-29 PROCEDURE — 1101F PT FALLS ASSESS-DOCD LE1/YR: CPT | Mod: HCNC,CPTII,S$GLB, | Performed by: INTERNAL MEDICINE

## 2024-02-29 PROCEDURE — 3078F DIAST BP <80 MM HG: CPT | Mod: HCNC,CPTII,S$GLB, | Performed by: INTERNAL MEDICINE

## 2024-02-29 PROCEDURE — 1160F RVW MEDS BY RX/DR IN RCRD: CPT | Mod: HCNC,CPTII,S$GLB, | Performed by: INTERNAL MEDICINE

## 2024-02-29 PROCEDURE — 77080 DXA BONE DENSITY AXIAL: CPT | Mod: 26,HCNC,, | Performed by: RADIOLOGY

## 2024-02-29 PROCEDURE — 99214 OFFICE O/P EST MOD 30 MIN: CPT | Mod: HCNC,S$GLB,, | Performed by: INTERNAL MEDICINE

## 2024-02-29 PROCEDURE — 77080 DXA BONE DENSITY AXIAL: CPT | Mod: TC,HCNC

## 2024-02-29 PROCEDURE — 99999 PR PBB SHADOW E&M-EST. PATIENT-LVL IV: CPT | Mod: PBBFAC,HCNC,, | Performed by: INTERNAL MEDICINE

## 2024-02-29 PROCEDURE — 1157F ADVNC CARE PLAN IN RCRD: CPT | Mod: HCNC,CPTII,S$GLB, | Performed by: INTERNAL MEDICINE

## 2024-02-29 PROCEDURE — 99499 UNLISTED E&M SERVICE: CPT | Mod: HCNC,S$GLB,, | Performed by: PODIATRIST

## 2024-02-29 PROCEDURE — 1126F AMNT PAIN NOTED NONE PRSNT: CPT | Mod: HCNC,CPTII,S$GLB, | Performed by: INTERNAL MEDICINE

## 2024-02-29 NOTE — PROGRESS NOTES
Subjective:   Patient ID:  Avril Monzon is a 79 y.o. female who presents for follow up of No chief complaint on file.      HPI  10/14/2022  A 76 yo female well known to me from previous encounter with cad s/p intervention nstemi esrd on hd underwent cabg subsequently had covid and  pulmonary edema. Has recovered well she is diabetic went to see podiatry had rt toe ulcer as well as heel ulcers bilaterally being managed with betadine application no discharge no pain      1/19/2023   Here frof /u has been doing well clinically has no chest pain gets a little hypotension on dialysis at the end her dry weight is getting adjusted compliant with meds has knots on leg told she has thrombosed varicosities still has  ulcer on big toe follows with podiatry. No fever chills syncope near syncope chf symptoms or angina. She is not taking her statins.         8/17/23  Here today for CV follow up. Pt was admitted recently after missing dialysis. Slight bump in troponin likely demand, here today and denies any CP, angina or anginal equivalent at this time. Reports she feels her best today since being DC     1/11/2024  Cabg in 7/22 [place] and svg to [facility]  She is short of breath since September. Had a fall she needs oxygen on dialysis has been eating potato chips. Her sodium intake is increased. Has been getting dialysis. Regularily she has lost weight she is not eating a lot though . Has hypotension on dialysis   She has moderate mr and pulmonary htn as rv dysfunction.    2/29/2024  Continues with cough dialysis not helping with symptoms dry cough no leg swelling. Had heart cath showing pulmonary htn and adequately revascularized. She saw pulmonary sleep eval in progress 2pillows at [facility].  Past Medical History:   Diagnosis Date    Abnormal nuclear stress test 02/12/2024    Acute hypoxemic respiratory failure 11/26/2018    Anemia     Arthritis     back, hand, knees    Back pain     Cataract     CKD stage G4/A3, GFR 15 - 29 and  albumin creatinine ratio >300 mg/g     GFR 40% Jan 2014 and 33% ub 3/2014 ([facility])    Coronary artery disease involving native coronary artery of native heart 11/30/2018    Diabetic retinopathy     DM (diabetes mellitus) type II controlled, neurological manifestation     Exudative age-related macular degeneration of left eye with active choroidal neovascularization 02/05/2019    GERD (gastroesophageal reflux disease)     Glaucoma     Heart failure     Hyperlipidemia     Hypertension     Non-ST elevation MI (NSTEMI) 11/28/2018    NSTEMI (non-ST elevated myocardial infarction) 11/27/2018    Peripheral neuropathy     Polyneuropathy     Proteinuria     >6 mo     Seizures     BRG 1/2014 -dick CT NRI showed small vessel    Skin ulcer of toe of right foot with fat layer exposed 10/14/2022    Stroke 2012,2014    Tobacco dependence     Type 2 diabetes with peripheral circulatory disorder, controlled        Past Surgical History:   Procedure Laterality Date    ANGIOGRAM, LOWER ARTERIAL, UNILATERAL  2/13/2024    Procedure: Angiogram, Lower Arterial, Unilateral;  Surgeon: Michelle Holman MD;  Location: [facility] CATH LAB;  Service: Cardiology;;    ARTERIOGRAPHY OF SUBCLAVIAN ARTERY  2/13/2024    Procedure: ARTERIOGRAM, SUBCLAVIAN;  Surgeon: Michelle Holman MD;  Location: [facility] CATH LAB;  Service: Cardiology;;    BREAST LUMPECTOMY Left     benign, when patient was 15 years old    BREAST MASS EXCISION      ,benign, age 15.    CATHETERIZATION OF BOTH LEFT AND RIGHT HEART N/A 11/28/2018    Procedure: CATHETERIZATION, HEART, BOTH LEFT AND RIGHT;  Surgeon: Michelle Holman MD;  Location: [facility] CATH LAB;  Service: Cardiology;  Laterality: N/A;    CATHETERIZATION OF BOTH LEFT AND RIGHT HEART N/A 11/30/2018    Procedure: CATHETERIZATION, HEART, BOTH LEFT AND RIGHT;  Surgeon: Michelle Holman MD;  Location: [facility] CATH LAB;  Service: Cardiology;  Laterality: N/A;    CORONARY ARTERY BYPASS GRAFT      CORONARY ARTERY BYPASS GRAFT (CABG) N/A 7/20/2022     Procedure: CORONARY ARTERY BYPASS GRAFT (CABG);  Surgeon: Sanju Locke MD;  Location: [facility];  Service: Cardiothoracic;  Laterality: N/A;  2-VESSEL PUMP ASSIST WITH EPI-AORTIC ULTRASOUND    CORONARY BYPASS GRAFT ANGIOGRAPHY  2/13/2024    Procedure: Bypass graft study;  Surgeon: Michelle Holman MD;  Location: [facility] CATH LAB;  Service: Cardiology;;    ENDOSCOPIC HARVEST OF VEIN Left 7/20/2022    Procedure: SURGICAL PROCUREMENT, VEIN, ENDOSCOPIC;  Surgeon: Sanju Locke MD;  Location: [facility];  Service: Cardiothoracic;  Laterality: Left;    HYSTERECTOMY  1982    INJECTION OF ANESTHETIC AGENT AROUND MULTIPLE INTERCOSTAL NERVES N/A 7/20/2022    Procedure: BLOCK, NERVE, INTERCOSTAL, 2 OR MORE;  Surgeon: Sanju Locke MD;  Location: [facility];  Service: Cardiothoracic;  Laterality: N/A;  PARASTERNAL NERVE BLOCK    INSERTION OF SHUNT      LEFT HEART CATHETERIZATION Left 12/3/2018    Procedure: CATHETERIZATION, HEART, LEFT;  Surgeon: Michelle Holman MD;  Location: [facility] CATH LAB;  Service: Cardiology;  Laterality: Left;  LAD ATHERECTOMY STENT/ FEMORAL APPROACH    LEFT HEART CATHETERIZATION Left 7/13/2022    Procedure: CATHETERIZATION, HEART, LEFT;  Surgeon: Abhi Sloan MD;  Location: [facility] CATH LAB;  Service: Cardiology;  Laterality: Left;    LEFT HEART CATHETERIZATION Left 2/13/2024    Procedure: Left heart cath;  Surgeon: Michelle Holman MD;  Location: [facility] CATH LAB;  Service: Cardiology;  Laterality: Left;    OOPHORECTOMY      RIGHT HEART CATHETERIZATION Right 2/13/2024    Procedure: INSERTION, CATHETER, RIGHT HEART;  Surgeon: Michelle Holman MD;  Location: [facility] CATH LAB;  Service: Cardiology;  Laterality: Right;    wrist cyst Right 1980s    dorsal wrist cyst       Social History     Tobacco Use    Smoking status: Former     Current packs/day: 0.00     Average packs/day: 1.5 packs/day for 30.0 years (45.0 ttl pk-yrs)     Types: Cigarettes     Start date: 1/9/1960     Quit date: 1/9/1990     Years  since quittin.1    Smokeless tobacco: Former   Substance Use Topics    Alcohol use: No     Alcohol/week: 0.0 standard drinks of alcohol    Drug use: No       Family History   Problem Relation Age of Onset    Diabetes Mother     Hyperlipidemia Mother     Hypertension Mother     Heart disease Mother         MI    Muscular dystrophy Son     Cancer Maternal Grandmother         colon       Current Outpatient Medications   Medication Sig    aspirin (ECOTRIN) 81 MG EC tablet Take 1 tablet (81 mg total) by mouth once daily.    atropine 1% (ISOPTO ATROPINE) 1 % Drop Place 1 drop into the left eye 3 (three) times daily.    azelastine (ASTELIN) 137 mcg (0.1 %) nasal spray 1 spray (137 mcg total) by Nasal route 2 (two) times daily.    busPIRone (BUSPAR) 5 MG Tab Take 1 tablet (5 mg total) by mouth 2 (two) times daily as needed (anxiety).    cyanocobalamin (VITAMIN B-12) 1000 MCG tablet Take 100 mcg by mouth once daily.    folic acid (FOLVITE) 1 MG tablet Take 1 mg by mouth once daily.    insulin aspart protamine-insulin aspart (NOVOLOG MIX 70-30FLEXPEN U-100) 100 unit/mL (70-30) InPn pen Inject 24 units into skin before breakfast, 10 units before dinner. On dialysis days, give 14 units before dinner.    LIDOcaine (LIDODERM) 5 % Place 1 patch onto the skin once daily.    loratadine (CLARITIN) 10 mg tablet Take 1 tablet (10 mg total) by mouth once daily.    metoprolol tartrate (LOPRESSOR) 50 MG tablet Take 1 tablet (50 mg total) by mouth 2 (two) times daily.    pantoprazole (PROTONIX) 40 MG tablet Take 1 tablet (40 mg total) by mouth once daily.    prednisoLONE acetate (PRED FORTE) 1 % DrpS Place 1 drop into both eyes 4 (four) times daily.    rosuvastatin (CRESTOR) 20 MG tablet Take 1 tablet (20 mg total) by mouth once daily.    sevelamer carbonate (RENVELA) 800 mg Tab Take 3 tablets (2,400 mg) by mouth three times a day with meals and 2 tablets (1,600 mg) by mouth with snacks    telmisartan (MICARDIS) 20 MG Tab Take 1  tablet (20 mg total) by mouth once daily.    traMADoL (ULTRAM) 50 mg tablet Take 1 tablet (50 mg total) by mouth every 6 (six) hours as needed for Pain.    blood sugar diagnostic (TRUE METRIX GLUCOSE TEST STRIP) Strp 1 strip by Misc.(Non-Drug; Combo Route) route 3 (three) times daily.     No current facility-administered medications for this visit.     Current Outpatient Medications on File Prior to Visit   Medication Sig    aspirin (ECOTRIN) 81 MG EC tablet Take 1 tablet (81 mg total) by mouth once daily.    atropine 1% (ISOPTO ATROPINE) 1 % Drop Place 1 drop into the left eye 3 (three) times daily.    azelastine (ASTELIN) 137 mcg (0.1 %) nasal spray 1 spray (137 mcg total) by Nasal route 2 (two) times daily.    busPIRone (BUSPAR) 5 MG Tab Take 1 tablet (5 mg total) by mouth 2 (two) times daily as needed (anxiety).    cyanocobalamin (VITAMIN B-12) 1000 MCG tablet Take 100 mcg by mouth once daily.    folic acid (FOLVITE) 1 MG tablet Take 1 mg by mouth once daily.    insulin aspart protamine-insulin aspart (NOVOLOG MIX 70-30FLEXPEN U-100) 100 unit/mL (70-30) InPn pen Inject 24 units into skin before breakfast, 10 units before dinner. On dialysis days, give 14 units before dinner.    LIDOcaine (LIDODERM) 5 % Place 1 patch onto the skin once daily.    loratadine (CLARITIN) 10 mg tablet Take 1 tablet (10 mg total) by mouth once daily.    metoprolol tartrate (LOPRESSOR) 50 MG tablet Take 1 tablet (50 mg total) by mouth 2 (two) times daily.    pantoprazole (PROTONIX) 40 MG tablet Take 1 tablet (40 mg total) by mouth once daily.    prednisoLONE acetate (PRED FORTE) 1 % DrpS Place 1 drop into both eyes 4 (four) times daily.    rosuvastatin (CRESTOR) 20 MG tablet Take 1 tablet (20 mg total) by mouth once daily.    sevelamer carbonate (RENVELA) 800 mg Tab Take 3 tablets (2,400 mg) by mouth three times a day with meals and 2 tablets (1,600 mg) by mouth with snacks    telmisartan (MICARDIS) 20 MG Tab Take 1 tablet (20 mg total)  "by mouth once daily.    traMADoL (ULTRAM) 50 mg tablet Take 1 tablet (50 mg total) by mouth every 6 (six) hours as needed for Pain.    blood sugar diagnostic (TRUE METRIX GLUCOSE TEST STRIP) Strp 1 strip by Misc.(Non-Drug; Combo Route) route 3 (three) times daily.    [DISCONTINUED] isosorbide-hydrALAZINE 20-37.5 mg (BIDIL) 20-37.5 mg Tab Take 1 tablet by mouth 3 (three) times daily.     No current facility-administered medications on file prior to visit.       Review of Systems   Constitutional: Negative for diaphoresis, malaise/fatigue and weight gain.   HENT:  Negative for hoarse voice.    Eyes:  Negative for double vision and visual disturbance.   Cardiovascular:  Positive for dyspnea on exertion. Negative for chest pain, claudication, cyanosis, irregular heartbeat, leg swelling, near-syncope, orthopnea, palpitations, paroxysmal nocturnal dyspnea and syncope.   Respiratory:  Positive for cough, shortness of breath and snoring. Negative for hemoptysis.    Hematologic/Lymphatic: Negative for bleeding problem. Does not bruise/bleed easily.   Skin:  Negative for color change and poor wound healing.   Musculoskeletal:  Negative for muscle cramps, muscle weakness and myalgias.   Gastrointestinal:  Negative for bloating, abdominal pain, change in bowel habit, diarrhea, heartburn, hematemesis, hematochezia, melena and nausea.   Neurological:  Negative for excessive daytime sleepiness, dizziness, headaches, light-headedness, loss of balance, numbness and weakness.   Psychiatric/Behavioral:  Negative for memory loss. The patient does not have insomnia.    Allergic/Immunologic: Negative for hives.       Objective:   Physical Exam  Vitals:    02/29/24 1451   BP: 134/60   BP Location: Right arm   Patient Position: Sitting   BP Method: Medium (Manual)   Pulse: 77   SpO2: 97%   Height: 5' 5" (1.651 m)   Constitutional:       General: She is not in acute distress.     Appearance: Normal appearance. She is well-developed. She is " not ill-appearing.   HENT:      Head: Normocephalic and atraumatic.   Eyes:      General: No scleral icterus.     Pupils: Pupils are equal, round, and reactive to light.   Neck:      Thyroid: No thyromegaly.      Vascular: Normal carotid pulses. No carotid bruit, hepatojugular reflux or JVD.      Trachea: No tracheal deviation.   Cardiovascular:      Rate and Rhythm: Normal rate and regular rhythm.      Pulses:           Carotid pulses are 2+ on the right side and 2+ on the left side.       Femoral pulses are 2+ on the right side and 2+ on the left side.       Popliteal pulses are 2+ on the right side and 2+ on the left side.        Dorsalis pedis pulses are 1+ on the right side and 1+ on the left side.        Posterior tibial pulses are 1+ on the right side and 1+ on the left side.      Heart sounds: Murmur heard.      High-pitched blowing holosystolic murmur is present with a grade of 2/6 at the apex.      No friction rub. No gallop. Rv heave noted loud s2     Comments: Patent left arm shunt  Pulmonary:      Effort: Pulmonary effort is normal. No respiratory distress.      Breath sounds: Normal breath sounds except decrease breath sounds at bases.. No wheezing, rhonchi or rales.      Comments: Scar cabg well healed.  Chest:      Chest wall: No tenderness.   Abdominal:      General: Bowel sounds are normal. There is no abdominal bruit.      Palpations: Abdomen is soft. There is no hepatomegaly or pulsatile mass.      Tenderness: There is no abdominal tenderness.   Musculoskeletal:      Right shoulder: No deformity.      Cervical back: Normal range of motion and neck supple.      Right lower leg: trace edema.      Left lower leg: trace edema.   Skin:     General: Skin is warm and dry.      Findings: No erythema or rash.      Nails: There is no clubbing.   Neurological:      General: No focal deficit present.      Mental Status: She is alert and oriented to person, place, and time.      Cranial Nerves: No cranial  nerve deficit.      Coordination: Coordination normal.   Psychiatric:         Mood and Affect: Mood normal.         Speech: Speech normal.         Behavior: Behavior normal.         Lab Results   Component Value Date    CHOL 143 12/05/2023    CHOL 171 07/13/2022    CHOL 246 (H) 10/19/2021      Body mass index is 20.95 kg/m².   Lab Results   Component Value Date    HGBA1C 6.9 (H) 12/05/2023      BMP  Lab Results   Component Value Date     02/13/2024    K 3.6 02/13/2024    CL 98 02/13/2024    CO2 25 02/13/2024    BUN 22 02/13/2024    CREATININE 4.7 (H) 02/13/2024    CALCIUM 9.5 02/13/2024    ANIONGAP 14 02/13/2024    EGFRNORACEVR 9 (A) 02/13/2024      Lab Results   Component Value Date    HDL 45 12/05/2023    HDL 59 07/13/2022    HDL 62 10/19/2021     Lab Results   Component Value Date    LDLCALC 81.0 12/05/2023    LDLCALC 102.8 07/13/2022    LDLCALC 159.6 (H) 10/19/2021     Lab Results   Component Value Date    TRIG 85 12/05/2023    TRIG 46 07/13/2022    TRIG 122 10/19/2021     Lab Results   Component Value Date    CHOLHDL 31.5 12/05/2023    CHOLHDL 34.5 07/13/2022    CHOLHDL 25.2 10/19/2021       Chemistry        Component Value Date/Time     02/13/2024 0701    K 3.6 02/13/2024 0701    CL 98 02/13/2024 0701    CO2 25 02/13/2024 0701    BUN 22 02/13/2024 0701    CREATININE 4.7 (H) 02/13/2024 0701     02/13/2024 0701        Component Value Date/Time    CALCIUM 9.5 02/13/2024 0701    ALKPHOS 90 12/05/2023 0840    AST 13 12/05/2023 0840    ALT 12 12/05/2023 0840    BILITOT 0.5 12/05/2023 0840    ESTGFRAFRICA 8 (A) 07/27/2022 0433    ESTGFRAFRICA 8 (A) 07/27/2022 0433    EGFRNONAA 7 (A) 07/27/2022 0433    EGFRNONAA 7 (A) 07/27/2022 0433          Lab Results   Component Value Date    TSH 1.686 08/04/2023     Lab Results   Component Value Date    INR 1.0 02/08/2024    INR 1.0 08/07/2022    INR 1.3 (H) 07/21/2022     Lab Results   Component Value Date    WBC 5.19 02/13/2024    HGB 11.3 (L) 02/13/2024     HCT 34.7 (L) 02/13/2024    MCV 97 02/13/2024     02/13/2024     BMP  Sodium   Date Value Ref Range Status   02/13/2024 137 136 - 145 mmol/L Final     Potassium   Date Value Ref Range Status   02/13/2024 3.6 3.5 - 5.1 mmol/L Final     Chloride   Date Value Ref Range Status   02/13/2024 98 95 - 110 mmol/L Final     CO2   Date Value Ref Range Status   02/13/2024 25 23 - 29 mmol/L Final     BUN   Date Value Ref Range Status   02/13/2024 22 8 - 23 mg/dL Final     Creatinine   Date Value Ref Range Status   02/13/2024 4.7 (H) 0.5 - 1.4 mg/dL Final     Calcium   Date Value Ref Range Status   02/13/2024 9.5 8.7 - 10.5 mg/dL Final     Anion Gap   Date Value Ref Range Status   02/13/2024 14 8 - 16 mmol/L Final     eGFR if    Date Value Ref Range Status   07/27/2022 8 (A) >60 mL/min/1.73 m^2 Final   07/27/2022 8 (A) >60 mL/min/1.73 m^2 Final     eGFR if non    Date Value Ref Range Status   07/27/2022 7 (A) >60 mL/min/1.73 m^2 Final     Comment:     Calculation used to obtain the estimated glomerular filtration  rate (eGFR) is the CKD-EPI equation.      07/27/2022 7 (A) >60 mL/min/1.73 m^2 Final     Comment:     Calculation used to obtain the estimated glomerular filtration  rate (eGFR) is the CKD-EPI equation.        CrCl cannot be calculated (Patient's most recent lab result is older than the maximum 7 days allowed.).    Assessment:     1. Chronic combined systolic and diastolic heart failure    2. Hypertension associated with diabetes    3. Proteinuria, unspecified type    4. 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, with long-term current use of insulin    5. Bilateral carotid artery stenosis    6. Aortic atherosclerosis    7. Type 2 diabetes mellitus with diabetic peripheral angiopathy without gangrene, with long-term current use of insulin    8. Vitamin D deficiency    9. Anemia in chronic kidney disease, on chronic dialysis    10. Iron deficiency  anemia due to chronic blood loss    11. Hyperlipidemia associated with type 2 diabetes mellitus    12. Pulmonary HTN    13. SOB (shortness of breath)    14. Coronary artery disease of native artery of native heart with stable angina pectoris    15. Non-rheumatic mitral regurgitation    16. Non-rheumatic tricuspid valve insufficiency    17. Unspecified inflammatory spondylopathy, lumbar region    18. DM type 2 with diabetic peripheral neuropathy    19. S/P CABG (coronary artery bypass graft)    20. Skin ulcer of toe of right foot with fat layer exposed    21. Pleural effusion    22. Bilateral pleural effusion    23. Chronic heart failure with preserved ejection fraction    24. Abnormal nuclear stress test      Reviewed cath findings as well as pulmonary w/u sleep apnea eval in progress. Discussed salt intake with patient as well as  the importance of bp control has chronic cough with suggestion of decrease breath sounds at bases will get  cxr to assess for pleural effusion in addition to bnp.her rt heart cath showed she has efficient dialysis since her filling pressure left sided are normal. I would like to get a darion to evaluate the mitral valve make sure she does not need any mitral clip or she has severe mr.  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  Plan:   Cxr    Bnp   Darion  F/u afterwards.

## 2024-02-29 NOTE — PROGRESS NOTES
Subjective:       Patient ID: Avril Monzon is a 79 y.o. female.    Chief Complaint: Nail Care (Nail care. Rates pain 0 , pt is on blood thinners, diabetic )      HPI: Patient presents to the office today with the chief complaint of elongated, thickened and dystrophic nail plates to the B/L foot. This patient is a Diabetic Type II, complicated with end-stage renal disease on dialysis and Peripheral Neuropathy. Patient does follow with Primary Care and/or Endocrinology for management of Diabetes Mellitus. This patient's PMD is Rose Germain MD. This patient last saw his/her primary care provider on 01/04/2024.     Hemoglobin A1C   Date Value Ref Range Status   12/05/2023 6.9 (H) 4.0 - 5.6 % Final     Comment:     ADA Screening Guidelines:  5.7-6.4%  Consistent with prediabetes  >or=6.5%  Consistent with diabetes    High levels of fetal hemoglobin interfere with the HbA1C  assay. Heterozygous hemoglobin variants (HbS, HgC, etc)do  not significantly interfere with this assay.   However, presence of multiple variants may affect accuracy.     04/04/2023 6.6 (H) 4.0 - 5.6 % Final     Comment:     ADA Screening Guidelines:  5.7-6.4%  Consistent with prediabetes  >or=6.5%  Consistent with diabetes    High levels of fetal hemoglobin interfere with the HbA1C  assay. Heterozygous hemoglobin variants (HbS, HgC, etc)do  not significantly interfere with this assay.   However, presence of multiple variants may affect accuracy.     09/13/2022 5.3 4.0 - 5.6 % Final     Comment:     ADA Screening Guidelines:  5.7-6.4%  Consistent with prediabetes  >or=6.5%  Consistent with diabetes    High levels of fetal hemoglobin interfere with the HbA1C  assay. Heterozygous hemoglobin variants (HbS, HgC, etc)do  not significantly interfere with this assay.   However, presence of multiple variants may affect accuracy.     04/26/2021 7.3 % Final   10/26/2020 9.0 % Final     Comment:     Per Care Everywhere    .     Review of patient's  allergies indicates:   Allergen Reactions    Norvasc [amlodipine] Shortness Of Breath    Codeine Swelling     Other Reaction(s): Itchy skin eruption (code-[id number],SNOMED-CT)    Propoxyphene Swelling     Other Reaction(s): Itchy skin eruption (code-[id number],SNOMED-CT)    Atorvastatin      Muscle twitching       Past Medical History:   Diagnosis Date    Abnormal nuclear stress test 02/12/2024    Acute hypoxemic respiratory failure 11/26/2018    Anemia     Arthritis     back, hand, knees    Back pain     Cataract     CKD stage G4/A3, GFR 15 - 29 and albumin creatinine ratio >300 mg/g     GFR 40% Jan 2014 and 33% ub 3/2014 ([facility])    Coronary artery disease involving native coronary artery of native heart 11/30/2018    Diabetic retinopathy     DM (diabetes mellitus) type II controlled, neurological manifestation     Exudative age-related macular degeneration of left eye with active choroidal neovascularization 02/05/2019    GERD (gastroesophageal reflux disease)     Glaucoma     Heart failure     Hyperlipidemia     Hypertension     Non-ST elevation MI (NSTEMI) 11/28/2018    NSTEMI (non-ST elevated myocardial infarction) 11/27/2018    Peripheral neuropathy     Polyneuropathy     Proteinuria     >6 mo     Seizures     BRG 1/2014 -dick CT [place] showed small vessel    Skin ulcer of toe of right foot with fat layer exposed 10/14/2022    Stroke 2012,2014    Tobacco dependence     Type 2 diabetes with peripheral circulatory disorder, controlled        Family History   Problem Relation Age of Onset    Diabetes Mother     Hyperlipidemia Mother     Hypertension Mother     Heart disease Mother         MI    Muscular dystrophy Son     Cancer Maternal Grandmother         colon       Social History     Socioeconomic History    Marital status:    Tobacco Use    Smoking status: Former     Current packs/day: 0.00     Average packs/day: 1.5 packs/day for 30.0 years (45.0 ttl pk-yrs)     Types: Cigarettes     Start date: 1/9/1960      Quit date: 1990     Years since quittin.1    Smokeless tobacco: Former   Substance and Sexual Activity    Alcohol use: No     Alcohol/week: 0.0 standard drinks of alcohol    Drug use: No    Sexual activity: Not Currently     Social Determinants of Health     Financial Resource Strain: Low Risk  (2023)    Overall Financial Resource Strain (CARDIA)     Difficulty of Paying Living Expenses: Not very hard   Food Insecurity: No Food Insecurity (2023)    Hunger Vital Sign     Worried About Running Out of Food in the Last Year: Never true     Ran Out of Food in the Last Year: Never true   Transportation Needs: No Transportation Needs (2023)    PRAPARE - Transportation     Lack of Transportation (Medical): No     Lack of Transportation (Non-Medical): No   Physical Activity: Unknown (2023)    Exercise Vital Sign     Days of Exercise per Week: 7 days   Recent Concern: Physical Activity - Inactive (2023)    Exercise Vital Sign     Days of Exercise per Week: 7 days     Minutes of Exercise per Session: 0 min   Stress: No Stress Concern Present (2023)    [employer] - Occupational Stress Questionnaire     Feeling of Stress : Not at all   Social Connections: Moderately Integrated (2023)    Social Connection and Isolation Panel [NHANES]     Frequency of Communication with Friends and Family: More than three times a week     Frequency of Social Gatherings with Friends and Family: Once a week     Attends Denominational Services: More than 4 times per year     Active Member of Clubs or Organizations: No     Attends Club or Organization Meetings: Never     Marital Status:    Housing Stability: Low Risk  (2023)    Housing Stability Vital Sign     Unable to Pay for Housing in the Last Year: No     Number of Places Lived in the Last Year: 1     Unstable Housing in the Last Year: No       Past Surgical History:   Procedure Laterality Date    ANGIOGRAM, LOWER  ARTERIAL, UNILATERAL  2/13/2024    Procedure: Angiogram, Lower Arterial, Unilateral;  Surgeon: Michelle Holman MD;  Location: [facility] CATH LAB;  Service: Cardiology;;    ARTERIOGRAPHY OF SUBCLAVIAN ARTERY  2/13/2024    Procedure: ARTERIOGRAM, SUBCLAVIAN;  Surgeon: Michelle Holman MD;  Location: [facility] CATH LAB;  Service: Cardiology;;    BREAST LUMPECTOMY Left     benign, when patient was 15 years old    BREAST MASS EXCISION      ,benign, age 15.    CATHETERIZATION OF BOTH LEFT AND RIGHT HEART N/A 11/28/2018    Procedure: CATHETERIZATION, HEART, BOTH LEFT AND RIGHT;  Surgeon: Michelle Holman MD;  Location: [facility] CATH LAB;  Service: Cardiology;  Laterality: N/A;    CATHETERIZATION OF BOTH LEFT AND RIGHT HEART N/A 11/30/2018    Procedure: CATHETERIZATION, HEART, BOTH LEFT AND RIGHT;  Surgeon: Michelle Holman MD;  Location: [facility] CATH LAB;  Service: Cardiology;  Laterality: N/A;    CORONARY ARTERY BYPASS GRAFT      CORONARY ARTERY BYPASS GRAFT (CABG) N/A 7/20/2022    Procedure: CORONARY ARTERY BYPASS GRAFT (CABG);  Surgeon: Sanju Locke MD;  Location: [facility];  Service: Cardiothoracic;  Laterality: N/A;  2-VESSEL PUMP ASSIST WITH EPI-AORTIC ULTRASOUND    CORONARY BYPASS GRAFT ANGIOGRAPHY  2/13/2024    Procedure: Bypass graft study;  Surgeon: Michelle Holman MD;  Location: [facility] CATH LAB;  Service: Cardiology;;    ENDOSCOPIC HARVEST OF VEIN Left 7/20/2022    Procedure: SURGICAL PROCUREMENT, VEIN, ENDOSCOPIC;  Surgeon: Sanju Locke MD;  Location: [facility];  Service: Cardiothoracic;  Laterality: Left;    HYSTERECTOMY  1982    INJECTION OF ANESTHETIC AGENT AROUND MULTIPLE INTERCOSTAL NERVES N/A 7/20/2022    Procedure: BLOCK, NERVE, INTERCOSTAL, 2 OR MORE;  Surgeon: Sanju Locke MD;  Location: [facility];  Service: Cardiothoracic;  Laterality: N/A;  PARASTERNAL NERVE BLOCK    INSERTION OF SHUNT      LEFT HEART CATHETERIZATION Left 12/3/2018    Procedure: CATHETERIZATION, HEART, LEFT;  Surgeon: Michelle Holman MD;   Location: [facility] CATH LAB;  Service: Cardiology;  Laterality: Left;  LAD ATHERECTOMY STENT/ FEMORAL APPROACH    LEFT HEART CATHETERIZATION Left 7/13/2022    Procedure: CATHETERIZATION, HEART, LEFT;  Surgeon: Abhi Sloan MD;  Location: [facility] CATH LAB;  Service: Cardiology;  Laterality: Left;    LEFT HEART CATHETERIZATION Left 2/13/2024    Procedure: Left heart cath;  Surgeon: Michelle Holman MD;  Location: [facility] CATH LAB;  Service: Cardiology;  Laterality: Left;    OOPHORECTOMY      RIGHT HEART CATHETERIZATION Right 2/13/2024    Procedure: INSERTION, CATHETER, RIGHT HEART;  Surgeon: Michelle Holman MD;  Location: [facility] CATH LAB;  Service: Cardiology;  Laterality: Right;    wrist cyst Right 1980s    dorsal wrist cyst       Review of Systems       Objective:   LMP  (LMP Unknown)     Physical Exam  LOWER EXTREMITY PHYSICAL EXAMINATION    VASCULAR:  The right dorsalis pedis pulse 2/4 and the right posterior tibial pulse 1/4.  The left dorsalis pedis pulse 2/4 and posterior tibial pulse on the left is 1/4.  Capillary refill is intact.  Pedal hair growth decreased.     NEUROLOGY: Protective sensation is not intact to the left and right plantar surfaces of the foot and digits, as the patient has no sensation/detection at greater than 4 distinct points of contact with 5.07 [place] monofilament. Sensation to light touch is intact on the left and right foot. Proprioception is intact, bilateral. Sensation to pin prick is reduced to absent. Vibratory sensation is diminished.    DERMATOLOGY:  Skin is supple, moist, intact.  There is no signs of callusing, ulcerations, other lesions identified to the dorsal or plantar aspect of the right or left foot.  The R1, 2, 5 and left L1,2, 5 are thickened, discolored dystrophic.  There is subungual debris.  Nail plates have area of dark discoloration.  The remaining nails 3-4 on the right foot and the left foot are elongated but of normal color, thickness, and texture.   There  is no signs of ingrowing into the medial or lateral borders.  There is no evidence of wounds or skin breakdown.  No edema or erythema.  No obvious lacerations or fissuring.  Interdigital spaces are clean, dry, intact.  No rashes or scars appreciated.    ORTHOPEDIC: Manual Muscle Testing is 5/5 in all planes on the left and right, without pains, with and without resistance. Gait pattern is non-antalgic.    Assessment:     1. 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, with long-term current use of insulin    2. Controlled type 2 diabetes mellitus with diabetic polyneuropathy, with long-term current use of insulin    3. End stage renal disease on dialysis    4. Onychodystrophy        Plan:     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, with long-term current use of insulin    Controlled type 2 diabetes mellitus with diabetic polyneuropathy, with long-term current use of insulin    End stage renal disease on dialysis    Onychodystrophy        Thorough discussion is had with the patient this afternoon, concerning the diagnosis, its etiology, and the treatment algorithm at present.  Greater than 50% of this visit spent on counseling and coordination of care. Greater than 15 minutes of a 20 minute appointment spent on education about the diabetic foot, neuropathy, and prevention of limb loss.  Shoe inspection. Diabetic Foot Education. Patient reminded of the importance of good nutrition and blood sugar control to help prevent podiatric complications of diabetes. Patient instructed on proper foot hygeine. We discussed wearing proper and supportive shoe gear, daily foot inspections, never walking barefooted or sock footed, never putting sharp instruments to feet which can cause major complications associated with infection, ulcers, lacerations.      Dystrophic nail plates, as outlined above (R#1,2,5  ; L#1,2,5 ), are sharply debrided with double action nail  nipper, and/or with the assistance of a mechanical rotary chaz, with removal of all offending nail and nail border(s), for reduction of pains. Nails are reduced in terms of length, width and girth with removal of subungual debris to facilitate pain free weight bearing and ambulation. The elongated nails as outlined in the objective portion of this note, were trimmed to appropriate length, with a double action nail nipper, for alleviation/reduction of pains as well. Follow up in approx. 3-4 months.        Future Appointments   Date Time Provider Department Center   2/29/2024  2:30 PM ONLC BMD1 ONLH DEXABMD [facility]   3/2/2024  8:30 PM SLEEP STUDY, [facility] SLEEPO [place]   3/20/2024  1:40 PM [place] Germain [place] [facility] [facility] [facility]   4/18/2024  9:45 AM ON XR1-DR ON XRAY [facility]   4/18/2024 10:00 AM PULMONARY LAB, [facility] PULMFS [facility]   4/18/2024 10:15 AM PULMONARY LAB, [facility] PULMFS [facility]   4/18/2024 10:45 AM PULMONARY LAB 2, ONLC ONLC PULMFS [facility]   4/30/2024  2:00 PM Eric Chaney MD ONLC PULMSVC [facility]   7/2/2024 10:15 AM Alina Bauer DPM ONLC POD [facility]   7/30/2024 10:30 AM Mahsa Haro, JAREN ONLC DIABETE [facility]

## 2024-02-29 NOTE — H&P (VIEW-ONLY)
Subjective:   Patient ID:  Avril Monzon is a 79 y.o. female who presents for follow up of No chief complaint on file.      HPI  10/14/2022  A 78 yo female well known to me from previous encounter with cad s/p intervention nstemi esrd on hd underwent cabg subsequently had covid and  pulmonary edema. Has recovered well she is diabetic went to see podiatry had rt toe ulcer as well as heel ulcers bilaterally being managed with betadine application no discharge no pain      1/19/2023   Here frof /u has been doing well clinically has no chest pain gets a little hypotension on dialysis at the end her dry weight is getting adjusted compliant with meds has knots on leg told she has thrombosed varicosities still has  ulcer on big toe follows with podiatry. No fever chills syncope near syncope chf symptoms or angina. She is not taking her statins.         8/17/23  Here today for CV follow up. Pt was admitted recently after missing dialysis. Slight bump in troponin likely demand, here today and denies any CP, angina or anginal equivalent at this time. Reports she feels her best today since being DC     1/11/2024  Cabg in 7/22 [place] and svg to [facility]  She is short of breath since September. Had a fall she needs oxygen on dialysis has been eating potato chips. Her sodium intake is increased. Has been getting dialysis. Regularily she has lost weight she is not eating a lot though . Has hypotension on dialysis   She has moderate mr and pulmonary htn as rv dysfunction.    2/29/2024  Continues with cough dialysis not helping with symptoms dry cough no leg swelling. Had heart cath showing pulmonary htn and adequately revascularized. She saw pulmonary sleep eval in progress 2pillows at [facility].  Past Medical History:   Diagnosis Date    Abnormal nuclear stress test 02/12/2024    Acute hypoxemic respiratory failure 11/26/2018    Anemia     Arthritis     back, hand, knees    Back pain     Cataract     CKD stage G4/A3, GFR 15 - 29 and  albumin creatinine ratio >300 mg/g     GFR 40% Jan 2014 and 33% ub 3/2014 (BRG)    Coronary artery disease involving native coronary artery of native heart 11/30/2018    Diabetic retinopathy     DM (diabetes mellitus) type II controlled, neurological manifestation     Exudative age-related macular degeneration of left eye with active choroidal neovascularization 02/05/2019    GERD (gastroesophageal reflux disease)     Glaucoma     Heart failure     Hyperlipidemia     Hypertension     Non-ST elevation MI (NSTEMI) 11/28/2018    NSTEMI (non-ST elevated myocardial infarction) 11/27/2018    Peripheral neuropathy     Polyneuropathy     Proteinuria     >6 mo     Seizures     BRG 1/2014 -dick CT NRI showed small vessel    Skin ulcer of toe of right foot with fat layer exposed 10/14/2022    Stroke 2012,2014    Tobacco dependence     Type 2 diabetes with peripheral circulatory disorder, controlled        Past Surgical History:   Procedure Laterality Date    ANGIOGRAM, LOWER ARTERIAL, UNILATERAL  2/13/2024    Procedure: Angiogram, Lower Arterial, Unilateral;  Surgeon: Michelle Holman MD;  Location: [facility] CATH LAB;  Service: Cardiology;;    ARTERIOGRAPHY OF SUBCLAVIAN ARTERY  2/13/2024    Procedure: ARTERIOGRAM, SUBCLAVIAN;  Surgeon: Michelle Holman MD;  Location: [facility] CATH LAB;  Service: Cardiology;;    BREAST LUMPECTOMY Left     benign, when patient was 15 years old    BREAST MASS EXCISION      ,benign, age 15.    CATHETERIZATION OF BOTH LEFT AND RIGHT HEART N/A 11/28/2018    Procedure: CATHETERIZATION, HEART, BOTH LEFT AND RIGHT;  Surgeon: Michelle Holman MD;  Location: [facility] CATH LAB;  Service: Cardiology;  Laterality: N/A;    CATHETERIZATION OF BOTH LEFT AND RIGHT HEART N/A 11/30/2018    Procedure: CATHETERIZATION, HEART, BOTH LEFT AND RIGHT;  Surgeon: Michelle Holman MD;  Location: [facility] CATH LAB;  Service: Cardiology;  Laterality: N/A;    CORONARY ARTERY BYPASS GRAFT      CORONARY ARTERY BYPASS GRAFT (CABG) N/A 7/20/2022     Procedure: CORONARY ARTERY BYPASS GRAFT (CABG);  Surgeon: Sanju Locke MD;  Location: [facility];  Service: Cardiothoracic;  Laterality: N/A;  2-VESSEL PUMP ASSIST WITH EPI-AORTIC ULTRASOUND    CORONARY BYPASS GRAFT ANGIOGRAPHY  2/13/2024    Procedure: Bypass graft study;  Surgeon: Michelle Holman MD;  Location: [facility] CATH LAB;  Service: Cardiology;;    ENDOSCOPIC HARVEST OF VEIN Left 7/20/2022    Procedure: SURGICAL PROCUREMENT, VEIN, ENDOSCOPIC;  Surgeon: Sanju Locke MD;  Location: [facility];  Service: Cardiothoracic;  Laterality: Left;    HYSTERECTOMY  1982    INJECTION OF ANESTHETIC AGENT AROUND MULTIPLE INTERCOSTAL NERVES N/A 7/20/2022    Procedure: BLOCK, NERVE, INTERCOSTAL, 2 OR MORE;  Surgeon: Sanju Locke MD;  Location: [facility];  Service: Cardiothoracic;  Laterality: N/A;  PARASTERNAL NERVE BLOCK    INSERTION OF SHUNT      LEFT HEART CATHETERIZATION Left 12/3/2018    Procedure: CATHETERIZATION, HEART, LEFT;  Surgeon: Michelle Holman MD;  Location: [facility] CATH LAB;  Service: Cardiology;  Laterality: Left;  LAD ATHERECTOMY STENT/ FEMORAL APPROACH    LEFT HEART CATHETERIZATION Left 7/13/2022    Procedure: CATHETERIZATION, HEART, LEFT;  Surgeon: Abhi Sloan MD;  Location: [facility] CATH LAB;  Service: Cardiology;  Laterality: Left;    LEFT HEART CATHETERIZATION Left 2/13/2024    Procedure: Left heart cath;  Surgeon: Michelle Holman MD;  Location: [facility] CATH LAB;  Service: Cardiology;  Laterality: Left;    OOPHORECTOMY      RIGHT HEART CATHETERIZATION Right 2/13/2024    Procedure: INSERTION, CATHETER, RIGHT HEART;  Surgeon: Michelle Holman MD;  Location: [facility] CATH LAB;  Service: Cardiology;  Laterality: Right;    wrist cyst Right 1980s    dorsal wrist cyst       Social History     Tobacco Use    Smoking status: Former     Current packs/day: 0.00     Average packs/day: 1.5 packs/day for 30.0 years (45.0 ttl pk-yrs)     Types: Cigarettes     Start date: 1/9/1960     Quit date: 1/9/1990     Years  since quittin.1    Smokeless tobacco: Former   Substance Use Topics    Alcohol use: No     Alcohol/week: 0.0 standard drinks of alcohol    Drug use: No       Family History   Problem Relation Age of Onset    Diabetes Mother     Hyperlipidemia Mother     Hypertension Mother     Heart disease Mother         MI    Muscular dystrophy Son     Cancer Maternal Grandmother         colon       Current Outpatient Medications   Medication Sig    aspirin (ECOTRIN) 81 MG EC tablet Take 1 tablet (81 mg total) by mouth once daily.    atropine 1% (ISOPTO ATROPINE) 1 % Drop Place 1 drop into the left eye 3 (three) times daily.    azelastine (ASTELIN) 137 mcg (0.1 %) nasal spray 1 spray (137 mcg total) by Nasal route 2 (two) times daily.    busPIRone (BUSPAR) 5 MG Tab Take 1 tablet (5 mg total) by mouth 2 (two) times daily as needed (anxiety).    cyanocobalamin (VITAMIN B-12) 1000 MCG tablet Take 100 mcg by mouth once daily.    folic acid (FOLVITE) 1 MG tablet Take 1 mg by mouth once daily.    insulin aspart protamine-insulin aspart (NOVOLOG MIX 70-30FLEXPEN U-100) 100 unit/mL (70-30) InPn pen Inject 24 units into skin before breakfast, 10 units before dinner. On dialysis days, give 14 units before dinner.    LIDOcaine (LIDODERM) 5 % Place 1 patch onto the skin once daily.    loratadine (CLARITIN) 10 mg tablet Take 1 tablet (10 mg total) by mouth once daily.    metoprolol tartrate (LOPRESSOR) 50 MG tablet Take 1 tablet (50 mg total) by mouth 2 (two) times daily.    pantoprazole (PROTONIX) 40 MG tablet Take 1 tablet (40 mg total) by mouth once daily.    prednisoLONE acetate (PRED FORTE) 1 % DrpS Place 1 drop into both eyes 4 (four) times daily.    rosuvastatin (CRESTOR) 20 MG tablet Take 1 tablet (20 mg total) by mouth once daily.    sevelamer carbonate (RENVELA) 800 mg Tab Take 3 tablets (2,400 mg) by mouth three times a day with meals and 2 tablets (1,600 mg) by mouth with snacks    telmisartan (MICARDIS) 20 MG Tab Take 1  tablet (20 mg total) by mouth once daily.    traMADoL (ULTRAM) 50 mg tablet Take 1 tablet (50 mg total) by mouth every 6 (six) hours as needed for Pain.    blood sugar diagnostic (TRUE METRIX GLUCOSE TEST STRIP) Strp 1 strip by Misc.(Non-Drug; Combo Route) route 3 (three) times daily.     No current facility-administered medications for this visit.     Current Outpatient Medications on File Prior to Visit   Medication Sig    aspirin (ECOTRIN) 81 MG EC tablet Take 1 tablet (81 mg total) by mouth once daily.    atropine 1% (ISOPTO ATROPINE) 1 % Drop Place 1 drop into the left eye 3 (three) times daily.    azelastine (ASTELIN) 137 mcg (0.1 %) nasal spray 1 spray (137 mcg total) by Nasal route 2 (two) times daily.    busPIRone (BUSPAR) 5 MG Tab Take 1 tablet (5 mg total) by mouth 2 (two) times daily as needed (anxiety).    cyanocobalamin (VITAMIN B-12) 1000 MCG tablet Take 100 mcg by mouth once daily.    folic acid (FOLVITE) 1 MG tablet Take 1 mg by mouth once daily.    insulin aspart protamine-insulin aspart (NOVOLOG MIX 70-30FLEXPEN U-100) 100 unit/mL (70-30) InPn pen Inject 24 units into skin before breakfast, 10 units before dinner. On dialysis days, give 14 units before dinner.    LIDOcaine (LIDODERM) 5 % Place 1 patch onto the skin once daily.    loratadine (CLARITIN) 10 mg tablet Take 1 tablet (10 mg total) by mouth once daily.    metoprolol tartrate (LOPRESSOR) 50 MG tablet Take 1 tablet (50 mg total) by mouth 2 (two) times daily.    pantoprazole (PROTONIX) 40 MG tablet Take 1 tablet (40 mg total) by mouth once daily.    prednisoLONE acetate (PRED FORTE) 1 % DrpS Place 1 drop into both eyes 4 (four) times daily.    rosuvastatin (CRESTOR) 20 MG tablet Take 1 tablet (20 mg total) by mouth once daily.    sevelamer carbonate (RENVELA) 800 mg Tab Take 3 tablets (2,400 mg) by mouth three times a day with meals and 2 tablets (1,600 mg) by mouth with snacks    telmisartan (MICARDIS) 20 MG Tab Take 1 tablet (20 mg total)  "by mouth once daily.    traMADoL (ULTRAM) 50 mg tablet Take 1 tablet (50 mg total) by mouth every 6 (six) hours as needed for Pain.    blood sugar diagnostic (TRUE METRIX GLUCOSE TEST STRIP) Strp 1 strip by Misc.(Non-Drug; Combo Route) route 3 (three) times daily.    [DISCONTINUED] isosorbide-hydrALAZINE 20-37.5 mg (BIDIL) 20-37.5 mg Tab Take 1 tablet by mouth 3 (three) times daily.     No current facility-administered medications on file prior to visit.       Review of Systems   Constitutional: Negative for diaphoresis, malaise/fatigue and weight gain.   HENT:  Negative for hoarse voice.    Eyes:  Negative for double vision and visual disturbance.   Cardiovascular:  Positive for dyspnea on exertion. Negative for chest pain, claudication, cyanosis, irregular heartbeat, leg swelling, near-syncope, orthopnea, palpitations, paroxysmal nocturnal dyspnea and syncope.   Respiratory:  Positive for cough, shortness of breath and snoring. Negative for hemoptysis.    Hematologic/Lymphatic: Negative for bleeding problem. Does not bruise/bleed easily.   Skin:  Negative for color change and poor wound healing.   Musculoskeletal:  Negative for muscle cramps, muscle weakness and myalgias.   Gastrointestinal:  Negative for bloating, abdominal pain, change in bowel habit, diarrhea, heartburn, hematemesis, hematochezia, melena and nausea.   Neurological:  Negative for excessive daytime sleepiness, dizziness, headaches, light-headedness, loss of balance, numbness and weakness.   Psychiatric/Behavioral:  Negative for memory loss. The patient does not have insomnia.    Allergic/Immunologic: Negative for hives.       Objective:   Physical Exam  Vitals:    02/29/24 1451   BP: 134/60   BP Location: Right arm   Patient Position: Sitting   BP Method: Medium (Manual)   Pulse: 77   SpO2: 97%   Height: 5' 5" (1.651 m)   Constitutional:       General: She is not in acute distress.     Appearance: Normal appearance. She is well-developed. She is " not ill-appearing.   HENT:      Head: Normocephalic and atraumatic.   Eyes:      General: No scleral icterus.     Pupils: Pupils are equal, round, and reactive to light.   Neck:      Thyroid: No thyromegaly.      Vascular: Normal carotid pulses. No carotid bruit, hepatojugular reflux or JVD.      Trachea: No tracheal deviation.   Cardiovascular:      Rate and Rhythm: Normal rate and regular rhythm.      Pulses:           Carotid pulses are 2+ on the right side and 2+ on the left side.       Femoral pulses are 2+ on the right side and 2+ on the left side.       Popliteal pulses are 2+ on the right side and 2+ on the left side.        Dorsalis pedis pulses are 1+ on the right side and 1+ on the left side.        Posterior tibial pulses are 1+ on the right side and 1+ on the left side.      Heart sounds: Murmur heard.      High-pitched blowing holosystolic murmur is present with a grade of 2/6 at the apex.      No friction rub. No gallop. Rv heave noted loud s2     Comments: Patent left arm shunt  Pulmonary:      Effort: Pulmonary effort is normal. No respiratory distress.      Breath sounds: Normal breath sounds except decrease breath sounds at bases.. No wheezing, rhonchi or rales.      Comments: Scar cabg well healed.  Chest:      Chest wall: No tenderness.   Abdominal:      General: Bowel sounds are normal. There is no abdominal bruit.      Palpations: Abdomen is soft. There is no hepatomegaly or pulsatile mass.      Tenderness: There is no abdominal tenderness.   Musculoskeletal:      Right shoulder: No deformity.      Cervical back: Normal range of motion and neck supple.      Right lower leg: trace edema.      Left lower leg: trace edema.   Skin:     General: Skin is warm and dry.      Findings: No erythema or rash.      Nails: There is no clubbing.   Neurological:      General: No focal deficit present.      Mental Status: She is alert and oriented to person, place, and time.      Cranial Nerves: No cranial  nerve deficit.      Coordination: Coordination normal.   Psychiatric:         Mood and Affect: Mood normal.         Speech: Speech normal.         Behavior: Behavior normal.         Lab Results   Component Value Date    CHOL 143 12/05/2023    CHOL 171 07/13/2022    CHOL 246 (H) 10/19/2021      Body mass index is 20.95 kg/m².   Lab Results   Component Value Date    HGBA1C 6.9 (H) 12/05/2023      BMP  Lab Results   Component Value Date     02/13/2024    K 3.6 02/13/2024    CL 98 02/13/2024    CO2 25 02/13/2024    BUN 22 02/13/2024    CREATININE 4.7 (H) 02/13/2024    CALCIUM 9.5 02/13/2024    ANIONGAP 14 02/13/2024    EGFRNORACEVR 9 (A) 02/13/2024      Lab Results   Component Value Date    HDL 45 12/05/2023    HDL 59 07/13/2022    HDL 62 10/19/2021     Lab Results   Component Value Date    LDLCALC 81.0 12/05/2023    LDLCALC 102.8 07/13/2022    LDLCALC 159.6 (H) 10/19/2021     Lab Results   Component Value Date    TRIG 85 12/05/2023    TRIG 46 07/13/2022    TRIG 122 10/19/2021     Lab Results   Component Value Date    CHOLHDL 31.5 12/05/2023    CHOLHDL 34.5 07/13/2022    CHOLHDL 25.2 10/19/2021       Chemistry        Component Value Date/Time     02/13/2024 0701    K 3.6 02/13/2024 0701    CL 98 02/13/2024 0701    CO2 25 02/13/2024 0701    BUN 22 02/13/2024 0701    CREATININE 4.7 (H) 02/13/2024 0701     02/13/2024 0701        Component Value Date/Time    CALCIUM 9.5 02/13/2024 0701    ALKPHOS 90 12/05/2023 0840    AST 13 12/05/2023 0840    ALT 12 12/05/2023 0840    BILITOT 0.5 12/05/2023 0840    ESTGFRAFRICA 8 (A) 07/27/2022 0433    ESTGFRAFRICA 8 (A) 07/27/2022 0433    EGFRNONAA 7 (A) 07/27/2022 0433    EGFRNONAA 7 (A) 07/27/2022 0433          Lab Results   Component Value Date    TSH 1.686 08/04/2023     Lab Results   Component Value Date    INR 1.0 02/08/2024    INR 1.0 08/07/2022    INR 1.3 (H) 07/21/2022     Lab Results   Component Value Date    WBC 5.19 02/13/2024    HGB 11.3 (L) 02/13/2024     HCT 34.7 (L) 02/13/2024    MCV 97 02/13/2024     02/13/2024     BMP  Sodium   Date Value Ref Range Status   02/13/2024 137 136 - 145 mmol/L Final     Potassium   Date Value Ref Range Status   02/13/2024 3.6 3.5 - 5.1 mmol/L Final     Chloride   Date Value Ref Range Status   02/13/2024 98 95 - 110 mmol/L Final     CO2   Date Value Ref Range Status   02/13/2024 25 23 - 29 mmol/L Final     BUN   Date Value Ref Range Status   02/13/2024 22 8 - 23 mg/dL Final     Creatinine   Date Value Ref Range Status   02/13/2024 4.7 (H) 0.5 - 1.4 mg/dL Final     Calcium   Date Value Ref Range Status   02/13/2024 9.5 8.7 - 10.5 mg/dL Final     Anion Gap   Date Value Ref Range Status   02/13/2024 14 8 - 16 mmol/L Final     eGFR if    Date Value Ref Range Status   07/27/2022 8 (A) >60 mL/min/1.73 m^2 Final   07/27/2022 8 (A) >60 mL/min/1.73 m^2 Final     eGFR if non    Date Value Ref Range Status   07/27/2022 7 (A) >60 mL/min/1.73 m^2 Final     Comment:     Calculation used to obtain the estimated glomerular filtration  rate (eGFR) is the CKD-EPI equation.      07/27/2022 7 (A) >60 mL/min/1.73 m^2 Final     Comment:     Calculation used to obtain the estimated glomerular filtration  rate (eGFR) is the CKD-EPI equation.        CrCl cannot be calculated (Patient's most recent lab result is older than the maximum 7 days allowed.).    Assessment:     1. Chronic combined systolic and diastolic heart failure    2. Hypertension associated with diabetes    3. Proteinuria, unspecified type    4. 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, with long-term current use of insulin    5. Bilateral carotid artery stenosis    6. Aortic atherosclerosis    7. Type 2 diabetes mellitus with diabetic peripheral angiopathy without gangrene, with long-term current use of insulin    8. Vitamin D deficiency    9. Anemia in chronic kidney disease, on chronic dialysis    10. Iron deficiency  anemia due to chronic blood loss    11. Hyperlipidemia associated with type 2 diabetes mellitus    12. Pulmonary HTN    13. SOB (shortness of breath)    14. Coronary artery disease of native artery of native heart with stable angina pectoris    15. Non-rheumatic mitral regurgitation    16. Non-rheumatic tricuspid valve insufficiency    17. Unspecified inflammatory spondylopathy, lumbar region    18. DM type 2 with diabetic peripheral neuropathy    19. S/P CABG (coronary artery bypass graft)    20. Skin ulcer of toe of right foot with fat layer exposed    21. Pleural effusion    22. Bilateral pleural effusion    23. Chronic heart failure with preserved ejection fraction    24. Abnormal nuclear stress test      Reviewed cath findings as well as pulmonary w/u sleep apnea eval in progress. Discussed salt intake with patient as well as  the importance of bp control has chronic cough with suggestion of decrease breath sounds at bases will get  cxr to assess for pleural effusion in addition to bnp.her rt heart cath showed she has efficient dialysis since her filling pressure left sided are normal. I would like to get a darion to evaluate the mitral valve make sure she does not need any mitral clip or she has severe mr.  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  Plan:   Cxr    Bnp   Darion  F/u afterwards.

## 2024-03-02 ENCOUNTER — HOSPITAL ENCOUNTER (EMERGENCY)
Facility: HOSPITAL | Age: 79
Discharge: HOME | End: 2024-03-02
Attending: EMERGENCY MEDICINE
Payer: MEDICARE

## 2024-03-02 VITALS
RESPIRATION RATE: 18 BRPM | DIASTOLIC BLOOD PRESSURE: 81 MMHG | HEART RATE: 67 BPM | SYSTOLIC BLOOD PRESSURE: 180 MMHG | TEMPERATURE: 98 F | WEIGHT: 142.63 LBS | OXYGEN SATURATION: 97 % | BODY MASS INDEX: 23.74 KG/M2

## 2024-03-02 DIAGNOSIS — T38.3X5A HYPOGLYCEMIA DUE TO INSULIN: Primary | ICD-10-CM

## 2024-03-02 DIAGNOSIS — Z99.2 ESRD ON DIALYSIS: ICD-10-CM

## 2024-03-02 DIAGNOSIS — E16.0 HYPOGLYCEMIA DUE TO INSULIN: Primary | ICD-10-CM

## 2024-03-02 DIAGNOSIS — I10 CHRONIC HYPERTENSION: ICD-10-CM

## 2024-03-02 DIAGNOSIS — E16.2 HYPOGLYCEMIA: ICD-10-CM

## 2024-03-02 DIAGNOSIS — N18.6 ESRD ON DIALYSIS: ICD-10-CM

## 2024-03-02 LAB
ALBUMIN SERPL BCP-MCNC: 3.1 G/DL (ref 3.5–5.2)
ALP SERPL-CCNC: 88 U/L (ref 55–135)
ALT SERPL W/O P-5'-P-CCNC: 10 U/L (ref 10–44)
ANION GAP SERPL CALC-SCNC: 12 MMOL/L (ref 8–16)
AST SERPL-CCNC: 16 U/L (ref 10–40)
BASOPHILS # BLD AUTO: 0.02 K/UL (ref 0–0.2)
BASOPHILS NFR BLD: 0.3 % (ref 0–1.9)
BILIRUB SERPL-MCNC: 0.6 MG/DL (ref 0.1–1)
BUN SERPL-MCNC: 12 MG/DL (ref 8–23)
CALCIUM SERPL-MCNC: 9.4 MG/DL (ref 8.7–10.5)
CHLORIDE SERPL-SCNC: 100 MMOL/L (ref 95–110)
CO2 SERPL-SCNC: 26 MMOL/L (ref 23–29)
CREAT SERPL-MCNC: 3.7 MG/DL (ref 0.5–1.4)
DIFFERENTIAL METHOD BLD: ABNORMAL
EOSINOPHIL # BLD AUTO: 0 K/UL (ref 0–0.5)
EOSINOPHIL NFR BLD: 0.1 % (ref 0–8)
ERYTHROCYTE [DISTWIDTH] IN BLOOD BY AUTOMATED COUNT: 14.2 % (ref 11.5–14.5)
EST. GFR  (NO RACE VARIABLE): 12 ML/MIN/1.73 M^2
GLUCOSE SERPL-MCNC: 107 MG/DL (ref 70–110)
HCT VFR BLD AUTO: 31.6 % (ref 37–48.5)
HGB BLD-MCNC: 10 G/DL (ref 12–16)
IMM GRANULOCYTES # BLD AUTO: 0.02 K/UL (ref 0–0.04)
IMM GRANULOCYTES NFR BLD AUTO: 0.3 % (ref 0–0.5)
INFLUENZA A, MOLECULAR: NEGATIVE
INFLUENZA B, MOLECULAR: NEGATIVE
LYMPHOCYTES # BLD AUTO: 0.4 K/UL (ref 1–4.8)
LYMPHOCYTES NFR BLD: 5.9 % (ref 18–48)
MAGNESIUM SERPL-MCNC: 1.9 MG/DL (ref 1.6–2.6)
MCH RBC QN AUTO: 31.5 PG (ref 27–31)
MCHC RBC AUTO-ENTMCNC: 31.6 G/DL (ref 32–36)
MCV RBC AUTO: 100 FL (ref 82–98)
MONOCYTES # BLD AUTO: 0.5 K/UL (ref 0.3–1)
MONOCYTES NFR BLD: 6.5 % (ref 4–15)
NEUTROPHILS # BLD AUTO: 6.4 K/UL (ref 1.8–7.7)
NEUTROPHILS NFR BLD: 86.9 % (ref 38–73)
NRBC BLD-RTO: 0 /100 WBC
PLATELET # BLD AUTO: 172 K/UL (ref 150–450)
PMV BLD AUTO: 9 FL (ref 9.2–12.9)
POCT GLUCOSE: 142 MG/DL (ref 70–110)
POCT GLUCOSE: 171 MG/DL (ref 70–110)
POCT GLUCOSE: 194 MG/DL (ref 70–110)
POCT GLUCOSE: 247 MG/DL (ref 70–110)
POCT GLUCOSE: 42 MG/DL (ref 70–110)
POCT GLUCOSE: 49 MG/DL (ref 70–110)
POTASSIUM SERPL-SCNC: 3.7 MMOL/L (ref 3.5–5.1)
PROT SERPL-MCNC: 6.5 G/DL (ref 6–8.4)
RBC # BLD AUTO: 3.17 M/UL (ref 4–5.4)
SARS-COV-2 RDRP RESP QL NAA+PROBE: NEGATIVE
SODIUM SERPL-SCNC: 138 MMOL/L (ref 136–145)
SPECIMEN SOURCE: NORMAL
WBC # BLD AUTO: 7.35 K/UL (ref 3.9–12.7)

## 2024-03-02 PROCEDURE — 80053 COMPREHEN METABOLIC PANEL: CPT | Mod: HCNC | Performed by: EMERGENCY MEDICINE

## 2024-03-02 PROCEDURE — 93010 ELECTROCARDIOGRAM REPORT: CPT | Mod: HCNC,,, | Performed by: INTERNAL MEDICINE

## 2024-03-02 PROCEDURE — 96374 THER/PROPH/DIAG INJ IV PUSH: CPT | Mod: HCNC

## 2024-03-02 PROCEDURE — 83735 ASSAY OF MAGNESIUM: CPT | Mod: HCNC | Performed by: EMERGENCY MEDICINE

## 2024-03-02 PROCEDURE — 99291 CRITICAL CARE FIRST HOUR: CPT | Mod: HCNC

## 2024-03-02 PROCEDURE — 87502 INFLUENZA DNA AMP PROBE: CPT | Mod: HCNC | Performed by: EMERGENCY MEDICINE

## 2024-03-02 PROCEDURE — 82962 GLUCOSE BLOOD TEST: CPT | Mod: HCNC,91

## 2024-03-02 PROCEDURE — U0002 COVID-19 LAB TEST NON-CDC: HCPCS | Mod: HCNC | Performed by: EMERGENCY MEDICINE

## 2024-03-02 PROCEDURE — 93005 ELECTROCARDIOGRAM TRACING: CPT | Mod: HCNC

## 2024-03-02 PROCEDURE — 25000003 PHARM REV CODE 250: Mod: HCNC | Performed by: EMERGENCY MEDICINE

## 2024-03-02 PROCEDURE — 85025 COMPLETE CBC W/AUTO DIFF WBC: CPT | Mod: HCNC | Performed by: EMERGENCY MEDICINE

## 2024-03-02 RX ORDER — DEXTROSE 50 % IN WATER (D50W) INTRAVENOUS SYRINGE
25
Status: COMPLETED | OUTPATIENT
Start: 2024-03-02 | End: 2024-03-02

## 2024-03-02 RX ADMIN — DEXTROSE MONOHYDRATE 25 G: 25 INJECTION, SOLUTION INTRAVENOUS at 04:03

## 2024-03-02 NOTE — ED PROVIDER NOTES
SCRIBE #1 NOTE: I, Steph Willis, am scribing for, and in the presence of, Earl Hull MD. I have scribed the HPI, ROS, and PEx.     SCRIBE #2 NOTE: I, Bobby Guthrie Jr., am scribing for, and in the presence of,  Lizzette Holder MD. I have scribed the remaining portions of the note not scribed by Scribe #1.      History     Chief Complaint   Patient presents with    Hypoglycemia     Pt daughter called EMS after pt was demonstrating AMS. On EMS arrival pt CBG was 48. Hx DM, HTN, pt went to dialysis yesterday and stated that she did not take her BP meds. C/o decreased appetite lately and was unable to eat much prior to taking insulin. EMS gave oral glucose and a sandwich cbg 116. CBG dropped back to 66 enroute. Pt AAO at this time      Review of patient's allergies indicates:   Allergen Reactions    Norvasc [amlodipine] Shortness Of Breath    Codeine Swelling     Other Reaction(s): Itchy skin eruption (code-[id number],SNOMED-CT)    Propoxyphene Swelling     Other Reaction(s): Itchy skin eruption (code-[id number],SNOMED-CT)    Atorvastatin      Muscle twitching         History of Present Illness     HPI    3/2/2024, 2:57 AM  History obtained from the patient and EMS      History of Present Illness: Avril Monzon is a 79 y.o. female patient with a PMHx of HTN, HLD, DM Type II, CKD stage 4, diabetic retinopathy, stroke, anemia, acute hypoxemic respiratory failure, NSTEMI, and CAD involving native coronary artery of native heart who presents to the Emergency Department for evaluation of hypoglycemia which onset PTA. Per EMS, 911 was called after pt's daughter found the pt diaphoretic and moaning in her room. When EMS arrived, pt's CBG was 48. She was given 1 tube of oral glucose and 1/2 of a sandwich, and her CBG increased to 116. Pt's CBG dropped again to 66 en route, so pt was given another oral glucose. Pt received dialysis yesterday and did not take her BP medication. Pt states she has had a decrease in  appetite lately and did not eat dinner prior to taking insulin. Symptoms are constant and moderate in severity. No mitigating or exacerbating factors reported. Associated sxs include diaphoresis and decreased appetite. Patient denies any CP, SOB, fever, dysuria, and all other sxs at this time. Prior Tx includes 2 tubes of oral glucose. No further complaints or concerns at this time.       Arrival mode: EMS    PCP: Rose Germain MD        Past Medical History:  Past Medical History:   Diagnosis Date    Abnormal nuclear stress test 02/12/2024    Acute hypoxemic respiratory failure 11/26/2018    Anemia     Arthritis     back, hand, knees    Back pain     Cataract     CKD stage G4/A3, GFR 15 - 29 and albumin creatinine ratio >300 mg/g     GFR 40% Jan 2014 and 33% ub 3/2014 (BRG)    Coronary artery disease involving native coronary artery of native heart 11/30/2018    Diabetic retinopathy     DM (diabetes mellitus) type II controlled, neurological manifestation     Exudative age-related macular degeneration of left eye with active choroidal neovascularization 02/05/2019    GERD (gastroesophageal reflux disease)     Glaucoma     Heart failure     Hyperlipidemia     Hypertension     Non-ST elevation MI (NSTEMI) 11/28/2018    NSTEMI (non-ST elevated myocardial infarction) 11/27/2018    Peripheral neuropathy     Polyneuropathy     Proteinuria     >6 mo     Seizures     BRG 1/2014 -dick CT NRI showed small vessel    Skin ulcer of toe of right foot with fat layer exposed 10/14/2022    Stroke 2012,2014    Tobacco dependence     Type 2 diabetes with peripheral circulatory disorder, controlled        Past Surgical History:  Past Surgical History:   Procedure Laterality Date    ANGIOGRAM, LOWER ARTERIAL, UNILATERAL  2/13/2024    Procedure: Angiogram, Lower Arterial, Unilateral;  Surgeon: Michelle Holman MD;  Location: [facility] CATH LAB;  Service: Cardiology;;    ARTERIOGRAPHY OF SUBCLAVIAN ARTERY  2/13/2024    Procedure:  ARTERIOGRAM, SUBCLAVIAN;  Surgeon: Michelle Holman MD;  Location: [facility] CATH LAB;  Service: Cardiology;;    BREAST LUMPECTOMY Left     benign, when patient was 15 years old    BREAST MASS EXCISION      ,benign, age 15.    CATHETERIZATION OF BOTH LEFT AND RIGHT HEART N/A 11/28/2018    Procedure: CATHETERIZATION, HEART, BOTH LEFT AND RIGHT;  Surgeon: Michelle Holman MD;  Location: [facility] CATH LAB;  Service: Cardiology;  Laterality: N/A;    CATHETERIZATION OF BOTH LEFT AND RIGHT HEART N/A 11/30/2018    Procedure: CATHETERIZATION, HEART, BOTH LEFT AND RIGHT;  Surgeon: Michelle Holmna MD;  Location: [facility] CATH LAB;  Service: Cardiology;  Laterality: N/A;    CORONARY ARTERY BYPASS GRAFT      CORONARY ARTERY BYPASS GRAFT (CABG) N/A 7/20/2022    Procedure: CORONARY ARTERY BYPASS GRAFT (CABG);  Surgeon: Sanju Locke MD;  Location: [facility];  Service: Cardiothoracic;  Laterality: N/A;  2-VESSEL PUMP ASSIST WITH EPI-AORTIC ULTRASOUND    CORONARY BYPASS GRAFT ANGIOGRAPHY  2/13/2024    Procedure: Bypass graft study;  Surgeon: Michelle Holmna MD;  Location: [facility] CATH LAB;  Service: Cardiology;;    ENDOSCOPIC HARVEST OF VEIN Left 7/20/2022    Procedure: SURGICAL PROCUREMENT, VEIN, ENDOSCOPIC;  Surgeon: Sanju Locke MD;  Location: [facility];  Service: Cardiothoracic;  Laterality: Left;    HYSTERECTOMY  1982    INJECTION OF ANESTHETIC AGENT AROUND MULTIPLE INTERCOSTAL NERVES N/A 7/20/2022    Procedure: BLOCK, NERVE, INTERCOSTAL, 2 OR MORE;  Surgeon: Sanju Locke MD;  Location: [facility];  Service: Cardiothoracic;  Laterality: N/A;  PARASTERNAL NERVE BLOCK    INSERTION OF SHUNT      LEFT HEART CATHETERIZATION Left 12/3/2018    Procedure: CATHETERIZATION, HEART, LEFT;  Surgeon: Michelle Holman MD;  Location: [facility] CATH LAB;  Service: Cardiology;  Laterality: Left;  LAD ATHERECTOMY STENT/ FEMORAL APPROACH    LEFT HEART CATHETERIZATION Left 7/13/2022    Procedure: CATHETERIZATION, HEART, LEFT;  Surgeon: Abhi Sloan MD;   Location: [facility] CATH LAB;  Service: Cardiology;  Laterality: Left;    LEFT HEART CATHETERIZATION Left 2024    Procedure: Left heart cath;  Surgeon: Michelle Holman MD;  Location: [facility] CATH LAB;  Service: Cardiology;  Laterality: Left;    OOPHORECTOMY      RIGHT HEART CATHETERIZATION Right 2024    Procedure: INSERTION, CATHETER, RIGHT HEART;  Surgeon: Michelle Holman MD;  Location: [facility] CATH LAB;  Service: Cardiology;  Laterality: Right;    wrist cyst Right 1980s    dorsal wrist cyst         Family History:  Family History   Problem Relation Age of Onset    Diabetes Mother     Hyperlipidemia Mother     Hypertension Mother     Heart disease Mother         MI    Muscular dystrophy Son     Cancer Maternal Grandmother         colon       Social History:  Social History     Tobacco Use    Smoking status: Former     Current packs/day: 0.00     Average packs/day: 1.5 packs/day for 30.0 years (45.0 ttl pk-yrs)     Types: Cigarettes     Start date: 1960     Quit date: 1990     Years since quittin.1    Smokeless tobacco: Former   Substance and Sexual Activity    Alcohol use: No     Alcohol/week: 0.0 standard drinks of alcohol    Drug use: No    Sexual activity: Not Currently        Review of Systems     Review of Systems   Constitutional:  Positive for appetite change (decreased) and diaphoresis. Negative for fever.        (+) low blood sugar   HENT:  Negative for sore throat.    Respiratory:  Negative for shortness of breath.    Cardiovascular:  Negative for chest pain.   Gastrointestinal:  Negative for nausea.   Genitourinary:  Negative for dysuria.   Musculoskeletal:  Negative for back pain.   Skin:  Negative for rash.   Neurological:  Negative for weakness.   Hematological:  Does not bruise/bleed easily.   All other systems reviewed and are negative.       Physical Exam     Initial Vitals   BP Pulse Resp Temp SpO2   24 0250 24 0250 24 0250 24 0415 24 0250   (!) 189/66  (!) 58 18 98.7 °F (37.1 °C) 96 %      MAP       --                 Physical Exam  Nursing Notes and Vital Signs Reviewed.  Constitutional: Patient is in no acute distress. Well-developed and well-nourished.  Head: Atraumatic. Normocephalic.  Eyes: PERRL. EOM intact. Conjunctivae are not pale. No scleral icterus.  ENT: Mucous membranes are moist. Oropharynx is clear and symmetric.    Neck: Supple. Full ROM. No lymphadenopathy.  Cardiovascular: Regular rate. Regular rhythm. No murmurs, rubs, or gallops. Distal pulses are 2+ and symmetric.  Pulmonary/Chest: No respiratory distress. Clear to auscultation bilaterally. No wheezing or rales.  Abdominal: Soft and non-distended.  There is no tenderness.  No rebound, guarding, or rigidity. Good bowel sounds.  Genitourinary: No CVA tenderness  Musculoskeletal: Moves all extremities. No obvious deformities. No edema. No calf tenderness. Dialysis shunt in L-arm. Thrill and bruit in LUE shunt.  Skin: Warm and dry.  Neurological:  Alert, awake, and appropriate.  Normal speech.  No acute focal neurological deficits are appreciated.  Psychiatric: Normal affect. Good eye contact. Appropriate in content.     ED Course   ED US Guided Misc Procedure    Date/Time: 3/2/2024 3:59 AM    Performed by: Steph Willis  Authorized by: Earl Hull MD    Procedure:  Ultrasound-guided peripheral venous cannulation  Indication:  Failed or difficult IV access   Right   Antecubital  Procedure:  Dynamic ultrasound guidance used, Candidate vein examined with linear probe - confirmed collapsibility, lack of pulsatility, and proper anatomic location. and Using aseptic technique, IV catheter inserted with flash of blood noted, flow of venous blood confirmed.  Catheter gauge:  18  Complications:  None  Charge?:  Yes  Critical Care    Date/Time: 3/2/2024 7:19 AM    Performed by: Lizzette Holder MD  Authorized by: Lizzette Holder MD  Direct patient critical care time: 15 minutes  Additional history  critical care time: 10 minutes  Ordering / reviewing critical care time: 10 minutes  Documentation critical care time: 5 minutes  Consult with family critical care time: 5 minutes  Total critical care time (exclusive of procedural time) : 45 minutes  Critical care time was exclusive of teaching time and separately billable procedures and treating other patients.  Critical care was necessary to treat or prevent imminent or life-threatening deterioration of the following conditions: Hypoglycemia.  Critical care was time spent personally by me on the following activities: blood draw for specimens, development of treatment plan with patient or surrogate, interpretation of cardiac output measurements, evaluation of patient's response to treatment, examination of patient, ordering and performing treatments and interventions, obtaining history from patient or surrogate, ordering and review of radiographic studies, ordering and review of laboratory studies, pulse oximetry, re-evaluation of patient's condition and review of old charts.        ED Vital Signs:  Vitals:    03/02/24 0250 03/02/24 0302 03/02/24 0413 03/02/24 0415   BP: (!) 189/66 (!) 207/78 (!) 180/81    Pulse: (!) 58 (!) 56 (!) 58    Resp: 18 (!) 24 (!) 35    Temp:    98.7 °F (37.1 °C)   SpO2: 96% 99% 98%    Weight:  64.7 kg (142 lb 10.2 oz)      03/02/24 0444 03/02/24 0622 03/02/24 0700 03/02/24 0755   BP:  (!) 167/76 (!) 188/83 (!) 180/81   Pulse: (!) 59 61 64 67   Resp: 15 16 15 18   Temp:    97.7 °F (36.5 °C)   SpO2: 97% 99% 97% 97%   Weight:           Abnormal Lab Results:  Labs Reviewed   CBC W/ AUTO DIFFERENTIAL - Abnormal; Notable for the following components:       Result Value    RBC 3.17 (*)     Hemoglobin 10.0 (*)     Hematocrit 31.6 (*)      (*)     MCH 31.5 (*)     MCHC 31.6 (*)     MPV 9.0 (*)     Lymph # 0.4 (*)     Gran % 86.9 (*)     Lymph % 5.9 (*)     All other components within normal limits   COMPREHENSIVE METABOLIC PANEL - Abnormal;  Notable for the following components:    Creatinine 3.7 (*)     Albumin 3.1 (*)     eGFR 12 (*)     All other components within normal limits   POCT GLUCOSE - Abnormal; Notable for the following components:    POCT Glucose 42 (*)     All other components within normal limits   POCT GLUCOSE - Abnormal; Notable for the following components:    POCT Glucose 49 (*)     All other components within normal limits   POCT GLUCOSE - Abnormal; Notable for the following components:    POCT Glucose 247 (*)     All other components within normal limits   POCT GLUCOSE - Abnormal; Notable for the following components:    POCT Glucose 194 (*)     All other components within normal limits   POCT GLUCOSE - Abnormal; Notable for the following components:    POCT Glucose 171 (*)     All other components within normal limits   POCT GLUCOSE - Abnormal; Notable for the following components:    POCT Glucose 142 (*)     All other components within normal limits   INFLUENZA A & B BY MOLECULAR   SARS-COV-2 RNA AMPLIFICATION, QUAL   MAGNESIUM        All Lab Results:  Results for orders placed or performed during the hospital encounter of 03/02/24   Influenza A & B by Molecular    Specimen: Nasopharyngeal Swab   Result Value Ref Range    Influenza A, Molecular Negative Negative    Influenza B, Molecular Negative Negative    Flu A & B Source Nasal swab    COVID-19 Rapid Screening   Result Value Ref Range    SARS-CoV-2 RNA, Amplification, Qual Negative Negative   CBC auto differential   Result Value Ref Range    WBC 7.35 3.90 - 12.70 K/uL    RBC 3.17 (L) 4.00 - 5.40 M/uL    Hemoglobin 10.0 (L) 12.0 - 16.0 g/dL    Hematocrit 31.6 (L) 37.0 - 48.5 %     (H) 82 - 98 fL    MCH 31.5 (H) 27.0 - 31.0 pg    MCHC 31.6 (L) 32.0 - 36.0 g/dL    RDW 14.2 11.5 - 14.5 %    Platelets 172 150 - 450 K/uL    MPV 9.0 (L) 9.2 - 12.9 fL    Immature Granulocytes 0.3 0.0 - 0.5 %    Gran # (ANC) 6.4 1.8 - 7.7 K/uL    Immature Grans (Abs) 0.02 0.00 - 0.04 K/uL     Lymph # 0.4 (L) 1.0 - 4.8 K/uL    Mono # 0.5 0.3 - 1.0 K/uL    Eos # 0.0 0.0 - 0.5 K/uL    Baso # 0.02 0.00 - 0.20 K/uL    nRBC 0 0 /100 WBC    Gran % 86.9 (H) 38.0 - 73.0 %    Lymph % 5.9 (L) 18.0 - 48.0 %    Mono % 6.5 4.0 - 15.0 %    Eosinophil % 0.1 0.0 - 8.0 %    Basophil % 0.3 0.0 - 1.9 %    Differential Method Automated    Comprehensive metabolic panel   Result Value Ref Range    Sodium 138 136 - 145 mmol/L    Potassium 3.7 3.5 - 5.1 mmol/L    Chloride 100 95 - 110 mmol/L    CO2 26 23 - 29 mmol/L    Glucose 107 70 - 110 mg/dL    BUN 12 8 - 23 mg/dL    Creatinine 3.7 (H) 0.5 - 1.4 mg/dL    Calcium 9.4 8.7 - 10.5 mg/dL    Total Protein 6.5 6.0 - 8.4 g/dL    Albumin 3.1 (L) 3.5 - 5.2 g/dL    Total Bilirubin 0.6 0.1 - 1.0 mg/dL    Alkaline Phosphatase 88 55 - 135 U/L    AST 16 10 - 40 U/L    ALT 10 10 - 44 U/L    eGFR 12 (A) >60 mL/min/1.73 m^2    Anion Gap 12 8 - 16 mmol/L   Magnesium   Result Value Ref Range    Magnesium 1.9 1.6 - 2.6 mg/dL   EKG 12-lead   Result Value Ref Range    QRS Duration 102 ms    [facility] QTC Calculation 493 ms   POCT glucose   Result Value Ref Range    POCT Glucose 42 (LL) 70 - 110 mg/dL   POCT glucose   Result Value Ref Range    POCT Glucose 49 (LL) 70 - 110 mg/dL   POCT glucose   Result Value Ref Range    POCT Glucose 247 (H) 70 - 110 mg/dL   POCT glucose   Result Value Ref Range    POCT Glucose 194 (H) 70 - 110 mg/dL   POCT glucose   Result Value Ref Range    POCT Glucose 171 (H) 70 - 110 mg/dL   POCT glucose   Result Value Ref Range    POCT Glucose 142 (H) 70 - 110 mg/dL     *Note: Due to a large number of results and/or encounters for the requested time period, some results have not been displayed. A complete set of results can be found in Results Review.        Imaging Results:  Imaging Results              X-Ray Chest AP Portable (Final result)  Result time 03/02/24 07:32:55      Final result by Adrian Davis MD (03/02/24 07:32:55)                   Impression:      See  above.      Electronically signed by: Adrian Davis MD  Date:    03/02/2024  Time:    07:32               Narrative:    EXAMINATION:  XR CHEST AP PORTABLE    CLINICAL HISTORY:  Respiratory distress.,    COMPARISON:  11/13/2023 x-ray    FINDINGS:  Postoperative changes.  Cardiomegaly is present.  Mild vascular prominence with right pleural effusion.                                       The EKG was ordered, reviewed, and independently interpreted by the ED provider.  Interpretation time: 06:25  Rate: 62 BPM  Rhythm: normal sinus rhythm  Interpretation: Left posterior fascicular block. Nonspecific ST and T wave abnormality. No STEMI.Prolonged QT.             The Emergency Provider reviewed the vital signs and test results, which are outlined above.     ED Discussion     6:00 AM: Dr. Hull transfers care of patient to Dr. Holder pending lab results.     6:38 AM: Re-evaluated pt. Pt is resting comfortably and is awake, alert, and talking. Pt's daughter is at bedside. Pt is tolerating PO. Pt c/o sore throat, but she had a normal throat examination on re-evaluation.     7:46 AM: Reassessed pt at this time.  Discussed with pt all pertinent ED information and results. Discussed pt dx and plan of tx. Gave pt all f/u and return to the ED instructions. All questions and concerns were addressed at this time. Pt expresses understanding of information and instructions, and is comfortable with plan to discharge. Pt is stable for discharge.    I discussed with patient and/or family/caretaker that evaluation in the ED does not suggest any emergent or life threatening medical conditions requiring immediate intervention beyond what was provided in the ED, and I believe patient is safe for discharge.  Regardless, an unremarkable evaluation in the ED does not preclude the development or presence of a serious of life threatening condition. As such, patient was instructed to return immediately for any worsening or change in current  symptoms.     ED Course as of 03/02/24 1555   Sat Mar 02, 2024   0256 Differential diagnosis includes decreased caloric intake, medication side effect, accidental overdose, sepsis. [BA]   0404 Patient has had oral glucose packet x2, a sandwitch x2, still reading low. Will give IV glucose.    [BA]      ED Course User Index  [BA] Earl Hull MD     Medical Decision Making  DDX:  1. Insulin reaction  2. Infection  3. ACS    Did not eat dinner last night but took insulin, woke up confused, BS less than 40, had dialysis yesterday, patient given dextrose in the ER, had a sandwich and juice, also c/o uri symptoms, flu and covid negative, ECG no acute ischemic changes noted, CXR reviewed and mild congestion noted, BP elevated but didn't take morning meds, CBC and kidney function at baseline, BS monitored and checked for several hours and remained consistently above 140, daughter at bedside, overall feel safe with discharge, admission initially considered, daughter and patient to continue to monitor BS closely at home, well balanced meal, reasons to return given.     Amount and/or Complexity of Data Reviewed  Labs: ordered. Decision-making details documented in ED Course.  Radiology: ordered. Decision-making details documented in ED Course.  ECG/medicine tests: ordered and independent interpretation performed. Decision-making details documented in ED Course.    Risk  Prescription drug management.  Decision regarding hospitalization.                ED Medication(s):  Medications   dextrose 50 % in water (D50W) injection 25 g (25 g Intravenous Given 3/2/24 0406)       Discharge Medication List as of 3/2/2024  7:46 AM           Follow-up Information       Rose Germain MD. Schedule an appointment as soon as possible for a visit in 2 days.    Specialty: Internal Medicine  Why: return to the emergency room, If symptoms worsen  Contact information:  [address]  [place]  [place] [address]  [phone]                                  Scribe Attestation:   Scribe #1: I performed the above scribed service and the documentation accurately describes the services I performed. I attest to the accuracy of the note.     Attending:   Physician Attestation Statement for Scribe #1: I, Earl Hull MD, personally performed the services described in this documentation, as scribed by Steph Willis, in my presence, and it is both accurate and complete.       Scribe Attestation:   Scribe #2: I performed the above scribed service and the documentation accurately describes the services I performed. I attest to the accuracy of the note.    Attending Attestation:           Physician Attestation for Scribe:    Physician Attestation Statement for Scribe #2: I, Lizzette Holder MD, reviewed documentation, as scribed by Bobby Guthrie Jr. in my presence, and it is both accurate and complete. I also acknowledge and confirm the content of the note done by Scribe #1.           Clinical Impression       ICD-10-CM ICD-9-CM   1. Hypoglycemia due to insulin  E16.0 251.1    T38.3X5A    2. Hypoglycemia  E16.2 251.2   3. ESRD on dialysis  N18.6 585.6    Z99.2 V45.11   4. Chronic hypertension  I10 401.9       Disposition:   Disposition: Discharged  Condition: Stable        Lizzette Holder MD  03/02/24 2831

## 2024-03-03 LAB
OHS QRS DURATION: 102 MS
OHS QTC CALCULATION: 493 MS

## 2024-03-04 ENCOUNTER — TELEPHONE (OUTPATIENT)
Dept: CARDIOLOGY | Facility: CLINIC | Age: 79
End: 2024-03-04
Payer: MEDICARE

## 2024-03-04 NOTE — TELEPHONE ENCOUNTER
Spoke with family member regarding 1230 start and 11am arrival time    ----- Message from Lisandra Don sent at 3/4/2024 11:50 AM CST -----  Contact: self [phone]  Patient daughter call in to get an arrival time for her up coming procedure, to schedule transportation.  Please called back [phone] Thanks IJ

## 2024-03-05 ENCOUNTER — HOSPITAL ENCOUNTER (OUTPATIENT)
Dept: RADIOLOGY | Facility: HOSPITAL | Age: 79
Discharge: HOME | DRG: 314 | End: 2024-03-05
Attending: INTERNAL MEDICINE
Payer: MEDICARE

## 2024-03-05 ENCOUNTER — TELEPHONE (OUTPATIENT)
Dept: CARDIOLOGY | Facility: CLINIC | Age: 79
End: 2024-03-05
Payer: MEDICARE

## 2024-03-05 DIAGNOSIS — J90 BILATERAL PLEURAL EFFUSION: ICD-10-CM

## 2024-03-05 DIAGNOSIS — I50.32 CHRONIC HEART FAILURE WITH PRESERVED EJECTION FRACTION: ICD-10-CM

## 2024-03-05 DIAGNOSIS — I34.0 NON-RHEUMATIC MITRAL REGURGITATION: ICD-10-CM

## 2024-03-05 PROCEDURE — 71046 X-RAY EXAM CHEST 2 VIEWS: CPT | Mod: TC,HCNC

## 2024-03-05 PROCEDURE — 71046 X-RAY EXAM CHEST 2 VIEWS: CPT | Mod: 26,HCNC,, | Performed by: RADIOLOGY

## 2024-03-05 NOTE — TELEPHONE ENCOUNTER
Patient's daughter  was notified of results. All questions were answered. Pt's daughter  verbalized understanding. Pt's daughter  will call back with any other questions or concerns.     ----- Message from Michelle Holman MD sent at 3/5/2024 11:12 AM CST -----  Minimal fluid in the covering of left lung nothing worrisome

## 2024-03-06 ENCOUNTER — TELEPHONE (OUTPATIENT)
Dept: CARDIOLOGY | Facility: CLINIC | Age: 79
End: 2024-03-06
Payer: MEDICARE

## 2024-03-06 NOTE — TELEPHONE ENCOUNTER
Patient's daughter was notified of results. All questions were answered. Pt's daughter verbalized understanding. Pt's daughter  will call back with any other questions or concerns.      ----- Message from Michelle Holman MD sent at 3/5/2024  8:08 PM CST -----  Heart failure marker is improving but still elevated.

## 2024-03-07 ENCOUNTER — ANESTHESIA (OUTPATIENT)
Dept: CARDIOLOGY | Facility: HOSPITAL | Age: 79
DRG: 314 | End: 2024-03-07
Payer: MEDICARE

## 2024-03-07 ENCOUNTER — HOSPITAL ENCOUNTER (INPATIENT)
Facility: HOSPITAL | Age: 79
LOS: 9 days | Discharge: HOSPICE | DRG: 314 | End: 2024-03-16
Attending: INTERNAL MEDICINE | Admitting: SPECIALIST
Payer: MEDICARE

## 2024-03-07 ENCOUNTER — ANESTHESIA EVENT (OUTPATIENT)
Dept: CARDIOLOGY | Facility: HOSPITAL | Age: 79
DRG: 314 | End: 2024-03-07
Payer: MEDICARE

## 2024-03-07 DIAGNOSIS — G40.803 OTHER INTRACTABLE EPILEPSY WITH STATUS EPILEPTICUS: ICD-10-CM

## 2024-03-07 DIAGNOSIS — G25.3 MYOCLONIC JERKING: ICD-10-CM

## 2024-03-07 DIAGNOSIS — I34.0 MITRAL REGURGITATION: ICD-10-CM

## 2024-03-07 DIAGNOSIS — I46.9 CARDIAC ARREST: ICD-10-CM

## 2024-03-07 DIAGNOSIS — G93.1 ANOXIC BRAIN INJURY: ICD-10-CM

## 2024-03-07 DIAGNOSIS — I34.0 NON-RHEUMATIC MITRAL REGURGITATION: Primary | ICD-10-CM

## 2024-03-07 PROBLEM — I50.43 ACUTE ON CHRONIC COMBINED SYSTOLIC AND DIASTOLIC HEART FAILURE: Status: ACTIVE | Noted: 2021-12-20

## 2024-03-07 LAB
ALBUMIN SERPL BCP-MCNC: 2.8 G/DL (ref 3.5–5.2)
ALLENS TEST: ABNORMAL
ALP SERPL-CCNC: 111 U/L (ref 55–135)
ALT SERPL W/O P-5'-P-CCNC: 75 U/L (ref 10–44)
ANION GAP SERPL CALC-SCNC: 13 MMOL/L (ref 8–16)
ANION GAP SERPL CALC-SCNC: 18 MMOL/L (ref 8–16)
ANION GAP SERPL CALC-SCNC: 21 MMOL/L (ref 8–16)
AST SERPL-CCNC: 82 U/L (ref 10–40)
BASOPHILS # BLD AUTO: 0.02 K/UL (ref 0–0.2)
BASOPHILS NFR BLD: 0.2 % (ref 0–1.9)
BILIRUB SERPL-MCNC: 0.8 MG/DL (ref 0.1–1)
BUN SERPL-MCNC: 18 MG/DL (ref 8–23)
BUN SERPL-MCNC: 18 MG/DL (ref 8–23)
BUN SERPL-MCNC: 23 MG/DL (ref 8–23)
CALCIUM SERPL-MCNC: 10.1 MG/DL (ref 8.7–10.5)
CALCIUM SERPL-MCNC: 10.1 MG/DL (ref 8.7–10.5)
CALCIUM SERPL-MCNC: 9.7 MG/DL (ref 8.7–10.5)
CHLORIDE SERPL-SCNC: 100 MMOL/L (ref 95–110)
CHLORIDE SERPL-SCNC: 101 MMOL/L (ref 95–110)
CHLORIDE SERPL-SCNC: 102 MMOL/L (ref 95–110)
CO2 SERPL-SCNC: 19 MMOL/L (ref 23–29)
CO2 SERPL-SCNC: 20 MMOL/L (ref 23–29)
CO2 SERPL-SCNC: 25 MMOL/L (ref 23–29)
CREAT SERPL-MCNC: 4.1 MG/DL (ref 0.5–1.4)
CREAT SERPL-MCNC: 4.3 MG/DL (ref 0.5–1.4)
CREAT SERPL-MCNC: 4.4 MG/DL (ref 0.5–1.4)
DELSYS: ABNORMAL
DIFFERENTIAL METHOD BLD: ABNORMAL
EOSINOPHIL # BLD AUTO: 0.1 K/UL (ref 0–0.5)
EOSINOPHIL NFR BLD: 0.6 % (ref 0–8)
ERYTHROCYTE [DISTWIDTH] IN BLOOD BY AUTOMATED COUNT: 14.2 % (ref 11.5–14.5)
ERYTHROCYTE [SEDIMENTATION RATE] IN BLOOD BY WESTERGREN METHOD: 22 MM/H
EST. GFR  (NO RACE VARIABLE): 10 ML/MIN/1.73 M^2
EST. GFR  (NO RACE VARIABLE): 10 ML/MIN/1.73 M^2
EST. GFR  (NO RACE VARIABLE): 11 ML/MIN/1.73 M^2
FIO2: 100
GLUCOSE SERPL-MCNC: 129 MG/DL (ref 70–110)
GLUCOSE SERPL-MCNC: 200 MG/DL (ref 70–110)
GLUCOSE SERPL-MCNC: 226 MG/DL (ref 70–110)
GLUCOSE SERPL-MCNC: 250 MG/DL (ref 70–110)
HCO3 UR-SCNC: 21.6 MMOL/L (ref 24–28)
HCT VFR BLD AUTO: 31.5 % (ref 37–48.5)
HCT VFR BLD CALC: 30 %PCV (ref 36–54)
HGB BLD-MCNC: 9.9 G/DL (ref 12–16)
IMM GRANULOCYTES # BLD AUTO: 0.2 K/UL (ref 0–0.04)
IMM GRANULOCYTES NFR BLD AUTO: 2.4 % (ref 0–0.5)
LACTATE SERPL-SCNC: 10.4 MMOL/L (ref 0.5–2.2)
LACTATE SERPL-SCNC: 2.5 MMOL/L (ref 0.5–2.2)
LACTATE SERPL-SCNC: 4.4 MMOL/L (ref 0.5–2.2)
LYMPHOCYTES # BLD AUTO: 2 K/UL (ref 1–4.8)
LYMPHOCYTES NFR BLD: 23.3 % (ref 18–48)
MCH RBC QN AUTO: 31.7 PG (ref 27–31)
MCHC RBC AUTO-ENTMCNC: 31.4 G/DL (ref 32–36)
MCV RBC AUTO: 101 FL (ref 82–98)
MODE: ABNORMAL
MONOCYTES # BLD AUTO: 0.3 K/UL (ref 0.3–1)
MONOCYTES NFR BLD: 3.6 % (ref 4–15)
NEUTROPHILS # BLD AUTO: 5.9 K/UL (ref 1.8–7.7)
NEUTROPHILS NFR BLD: 69.9 % (ref 38–73)
NRBC BLD-RTO: 1 /100 WBC
PCO2 BLDA: 49.3 MMHG (ref 35–45)
PEEP: 5
PH SMN: 7.25 [PH] (ref 7.35–7.45)
PLATELET # BLD AUTO: 214 K/UL (ref 150–450)
PMV BLD AUTO: 9.2 FL (ref 9.2–12.9)
PO2 BLDA: 333 MMHG (ref 80–100)
POC BE: -6 MMOL/L
POC IONIZED CALCIUM: 1.44 MMOL/L (ref 1.06–1.42)
POC SATURATED O2: 100 % (ref 95–100)
POTASSIUM BLD-SCNC: 3.9 MMOL/L (ref 3.5–5.1)
POTASSIUM SERPL-SCNC: 3.5 MMOL/L (ref 3.5–5.1)
POTASSIUM SERPL-SCNC: 3.6 MMOL/L (ref 3.5–5.1)
POTASSIUM SERPL-SCNC: 3.9 MMOL/L (ref 3.5–5.1)
PROT SERPL-MCNC: 5.9 G/DL (ref 6–8.4)
RBC # BLD AUTO: 3.12 M/UL (ref 4–5.4)
SAMPLE: ABNORMAL
SITE: ABNORMAL
SODIUM BLD-SCNC: 139 MMOL/L (ref 136–145)
SODIUM SERPL-SCNC: 138 MMOL/L (ref 136–145)
SODIUM SERPL-SCNC: 139 MMOL/L (ref 136–145)
SODIUM SERPL-SCNC: 142 MMOL/L (ref 136–145)
VT: 395
WBC # BLD AUTO: 8.38 K/UL (ref 3.9–12.7)

## 2024-03-07 PROCEDURE — A4217 STERILE WATER/SALINE, 500 ML: HCPCS | Mod: HCNC | Performed by: NURSE PRACTITIONER

## 2024-03-07 PROCEDURE — 25000003 PHARM REV CODE 250: Mod: HCNC | Performed by: NURSE PRACTITIONER

## 2024-03-07 PROCEDURE — 80048 BASIC METABOLIC PNL TOTAL CA: CPT | Mod: HCNC,XB | Performed by: INTERNAL MEDICINE

## 2024-03-07 PROCEDURE — 82803 BLOOD GASES ANY COMBINATION: CPT | Mod: HCNC

## 2024-03-07 PROCEDURE — 27100171 HC OXYGEN HIGH FLOW UP TO 24 HOURS: Mod: HCNC

## 2024-03-07 PROCEDURE — 63600175 PHARM REV CODE 636 W HCPCS: Mod: HCNC | Performed by: NURSE ANESTHETIST, CERTIFIED REGISTERED

## 2024-03-07 PROCEDURE — 37000008 HC ANESTHESIA 1ST 15 MINUTES: Mod: HCNC | Performed by: INTERNAL MEDICINE

## 2024-03-07 PROCEDURE — 85025 COMPLETE CBC W/AUTO DIFF WBC: CPT | Mod: HCNC | Performed by: NURSE PRACTITIONER

## 2024-03-07 PROCEDURE — 5A12012 PERFORMANCE OF CARDIAC OUTPUT, SINGLE, MANUAL: ICD-10-PCS | Performed by: INTERNAL MEDICINE

## 2024-03-07 PROCEDURE — 0BH17EZ INSERTION OF ENDOTRACHEAL AIRWAY INTO TRACHEA, VIA NATURAL OR ARTIFICIAL OPENING: ICD-10-PCS | Performed by: STUDENT IN AN ORGANIZED HEALTH CARE EDUCATION/TRAINING PROGRAM

## 2024-03-07 PROCEDURE — 99900035 HC TECH TIME PER 15 MIN (STAT): Mod: HCNC

## 2024-03-07 PROCEDURE — 25000003 PHARM REV CODE 250: Mod: HCNC

## 2024-03-07 PROCEDURE — 5A2204Z RESTORATION OF CARDIAC RHYTHM, SINGLE: ICD-10-PCS | Performed by: INTERNAL MEDICINE

## 2024-03-07 PROCEDURE — 02HV33Z INSERTION OF INFUSION DEVICE INTO SUPERIOR VENA CAVA, PERCUTANEOUS APPROACH: ICD-10-PCS | Performed by: SPECIALIST

## 2024-03-07 PROCEDURE — 99900026 HC AIRWAY MAINTENANCE (STAT): Mod: HCNC

## 2024-03-07 PROCEDURE — 80053 COMPREHEN METABOLIC PANEL: CPT | Mod: HCNC | Performed by: NURSE PRACTITIONER

## 2024-03-07 PROCEDURE — 83605 ASSAY OF LACTIC ACID: CPT | Mod: HCNC | Performed by: NURSE PRACTITIONER

## 2024-03-07 PROCEDURE — 63600175 PHARM REV CODE 636 W HCPCS: Mod: HCNC | Performed by: NURSE PRACTITIONER

## 2024-03-07 PROCEDURE — 25000003 PHARM REV CODE 250: Mod: HCNC | Performed by: SPECIALIST

## 2024-03-07 PROCEDURE — 83605 ASSAY OF LACTIC ACID: CPT | Mod: 91,HCNC | Performed by: NURSE PRACTITIONER

## 2024-03-07 PROCEDURE — 36620 INSERTION CATHETER ARTERY: CPT | Mod: HCNC

## 2024-03-07 PROCEDURE — 94761 N-INVAS EAR/PLS OXIMETRY MLT: CPT | Mod: HCNC,XB

## 2024-03-07 PROCEDURE — 94002 VENT MGMT INPAT INIT DAY: CPT | Mod: HCNC

## 2024-03-07 PROCEDURE — 20000000 HC ICU ROOM: Mod: HCNC

## 2024-03-07 PROCEDURE — 80048 BASIC METABOLIC PNL TOTAL CA: CPT | Mod: 91,HCNC,XB | Performed by: NURSE PRACTITIONER

## 2024-03-07 PROCEDURE — 31500 INSERT EMERGENCY AIRWAY: CPT | Mod: HCNC

## 2024-03-07 PROCEDURE — C9113 INJ PANTOPRAZOLE SODIUM, VIA: HCPCS | Mod: HCNC | Performed by: NURSE PRACTITIONER

## 2024-03-07 PROCEDURE — 5A1955Z RESPIRATORY VENTILATION, GREATER THAN 96 CONSECUTIVE HOURS: ICD-10-PCS | Performed by: STUDENT IN AN ORGANIZED HEALTH CARE EDUCATION/TRAINING PROGRAM

## 2024-03-07 PROCEDURE — 63600175 PHARM REV CODE 636 W HCPCS: Mod: HCNC | Performed by: SPECIALIST

## 2024-03-07 PROCEDURE — 37000009 HC ANESTHESIA EA ADD 15 MINS: Mod: HCNC | Performed by: INTERNAL MEDICINE

## 2024-03-07 PROCEDURE — 25000003 PHARM REV CODE 250: Mod: HCNC | Performed by: NURSE ANESTHETIST, CERTIFIED REGISTERED

## 2024-03-07 RX ORDER — INDOMETHACIN 25 MG/1
CAPSULE ORAL
Status: COMPLETED
Start: 2024-03-07 | End: 2024-03-07

## 2024-03-07 RX ORDER — MUPIROCIN 20 MG/G
OINTMENT TOPICAL 2 TIMES DAILY
Status: DISCONTINUED | OUTPATIENT
Start: 2024-03-07 | End: 2024-03-07

## 2024-03-07 RX ORDER — LIDOCAINE HYDROCHLORIDE 20 MG/ML
INJECTION INTRAVENOUS
Status: DISCONTINUED | OUTPATIENT
Start: 2024-03-07 | End: 2024-03-07

## 2024-03-07 RX ORDER — NOREPINEPHRINE BITARTRATE/D5W 4MG/250ML
0-3 PLASTIC BAG, INJECTION (ML) INTRAVENOUS CONTINUOUS
Status: DISCONTINUED | OUTPATIENT
Start: 2024-03-07 | End: 2024-03-10

## 2024-03-07 RX ORDER — PHENYLEPHRINE HYDROCHLORIDE 10 MG/ML
INJECTION INTRAVENOUS
Status: DISCONTINUED | OUTPATIENT
Start: 2024-03-07 | End: 2024-03-07

## 2024-03-07 RX ORDER — FAMOTIDINE 10 MG/ML
20 INJECTION INTRAVENOUS DAILY
Status: DISCONTINUED | OUTPATIENT
Start: 2024-03-07 | End: 2024-03-07

## 2024-03-07 RX ORDER — FENTANYL CITRATE 50 UG/ML
100 INJECTION, SOLUTION INTRAMUSCULAR; INTRAVENOUS ONCE
Status: COMPLETED | OUTPATIENT
Start: 2024-03-07 | End: 2024-03-07

## 2024-03-07 RX ORDER — ACETAMINOPHEN 650 MG/20.3ML
650 LIQUID ORAL EVERY 6 HOURS PRN
Status: DISCONTINUED | OUTPATIENT
Start: 2024-03-07 | End: 2024-03-08

## 2024-03-07 RX ORDER — PROPOFOL 10 MG/ML
VIAL (ML) INTRAVENOUS
Status: DISCONTINUED | OUTPATIENT
Start: 2024-03-07 | End: 2024-03-07

## 2024-03-07 RX ORDER — PROPOFOL 10 MG/ML
0-50 INJECTION, EMULSION INTRAVENOUS CONTINUOUS
Status: DISCONTINUED | OUTPATIENT
Start: 2024-03-07 | End: 2024-03-08

## 2024-03-07 RX ORDER — EPHEDRINE SULFATE 50 MG/ML
INJECTION, SOLUTION INTRAVENOUS
Status: DISCONTINUED | OUTPATIENT
Start: 2024-03-07 | End: 2024-03-07

## 2024-03-07 RX ORDER — METOPROLOL TARTRATE 25 MG/1
25 TABLET, FILM COATED ORAL 2 TIMES DAILY
Status: DISCONTINUED | OUTPATIENT
Start: 2024-03-07 | End: 2024-03-07

## 2024-03-07 RX ORDER — CALCIUM CHLORIDE INJECTION 100 MG/ML
INJECTION, SOLUTION INTRAVENOUS
Status: COMPLETED
Start: 2024-03-07 | End: 2024-03-07

## 2024-03-07 RX ORDER — METOPROLOL TARTRATE 25 MG/1
25 TABLET, FILM COATED ORAL 2 TIMES DAILY
Status: DISCONTINUED | OUTPATIENT
Start: 2024-03-07 | End: 2024-03-08

## 2024-03-07 RX ORDER — CHLORHEXIDINE GLUCONATE ORAL RINSE 1.2 MG/ML
15 SOLUTION DENTAL 2 TIMES DAILY
Status: DISCONTINUED | OUTPATIENT
Start: 2024-03-07 | End: 2024-03-15

## 2024-03-07 RX ORDER — SODIUM BICARBONATE 1 MEQ/ML
SYRINGE (ML) INTRAVENOUS CODE/TRAUMA/SEDATION MEDICATION
Status: COMPLETED | OUTPATIENT
Start: 2024-03-07 | End: 2024-03-07

## 2024-03-07 RX ORDER — SODIUM CHLORIDE 0.9 % (FLUSH) 0.9 %
10 SYRINGE (ML) INJECTION
Status: DISCONTINUED | OUTPATIENT
Start: 2024-03-07 | End: 2024-03-16 | Stop reason: HOSPADM

## 2024-03-07 RX ORDER — LEVETIRACETAM 10 MG/ML
1000 INJECTION INTRAVASCULAR EVERY 12 HOURS
Status: DISCONTINUED | OUTPATIENT
Start: 2024-03-07 | End: 2024-03-11

## 2024-03-07 RX ORDER — EPINEPHRINE 0.1 MG/ML
INJECTION INTRAVENOUS CODE/TRAUMA/SEDATION MEDICATION
Status: COMPLETED | OUTPATIENT
Start: 2024-03-07 | End: 2024-03-07

## 2024-03-07 RX ORDER — PANTOPRAZOLE SODIUM 40 MG/10ML
40 INJECTION, POWDER, LYOPHILIZED, FOR SOLUTION INTRAVENOUS DAILY
Status: DISCONTINUED | OUTPATIENT
Start: 2024-03-07 | End: 2024-03-16 | Stop reason: HOSPADM

## 2024-03-07 RX ORDER — CHLORHEXIDINE GLUCONATE ORAL RINSE 1.2 MG/ML
15 SOLUTION DENTAL 2 TIMES DAILY
Status: DISCONTINUED | OUTPATIENT
Start: 2024-03-07 | End: 2024-03-07

## 2024-03-07 RX ORDER — FENTANYL CITRATE-0.9 % NACL/PF 10 MCG/ML
0-200 PLASTIC BAG, INJECTION (ML) INTRAVENOUS CONTINUOUS
Status: DISCONTINUED | OUTPATIENT
Start: 2024-03-07 | End: 2024-03-08

## 2024-03-07 RX ORDER — SODIUM CHLORIDE 9 MG/ML
INJECTION, SOLUTION INTRAVENOUS ONCE
Status: DISCONTINUED | OUTPATIENT
Start: 2024-03-07 | End: 2024-03-07 | Stop reason: HOSPADM

## 2024-03-07 RX ORDER — INDOMETHACIN 25 MG/1
50 CAPSULE ORAL ONCE
Status: COMPLETED | OUTPATIENT
Start: 2024-03-07 | End: 2024-03-07

## 2024-03-07 RX ORDER — CALCIUM CHLORIDE INJECTION 100 MG/ML
1 INJECTION, SOLUTION INTRAVENOUS ONCE
Status: COMPLETED | OUTPATIENT
Start: 2024-03-07 | End: 2024-03-07

## 2024-03-07 RX ORDER — MUPIROCIN 20 MG/G
OINTMENT TOPICAL 2 TIMES DAILY
Status: COMPLETED | OUTPATIENT
Start: 2024-03-07 | End: 2024-03-12

## 2024-03-07 RX ADMIN — METOPROLOL TARTRATE 25 MG: 25 TABLET, FILM COATED ORAL at 06:03

## 2024-03-07 RX ADMIN — PHENYLEPHRINE HYDROCHLORIDE 400 MCG: 10 INJECTION INTRAVENOUS at 12:03

## 2024-03-07 RX ADMIN — EPHEDRINE SULFATE 20 MG: 50 INJECTION INTRAVENOUS at 12:03

## 2024-03-07 RX ADMIN — MUPIROCIN: 20 OINTMENT TOPICAL at 09:03

## 2024-03-07 RX ADMIN — ACETAMINOPHEN 650 MG: 650 SOLUTION ORAL at 11:03

## 2024-03-07 RX ADMIN — Medication 25 MCG/HR: at 08:03

## 2024-03-07 RX ADMIN — SODIUM CHLORIDE: 0.9 INJECTION, SOLUTION INTRAVENOUS at 12:03

## 2024-03-07 RX ADMIN — PROPOFOL 5 MCG/KG/MIN: 10 INJECTION, EMULSION INTRAVENOUS at 06:03

## 2024-03-07 RX ADMIN — CALCIUM CHLORIDE INJECTION 1 G: 100 INJECTION, SOLUTION INTRAVENOUS at 01:03

## 2024-03-07 RX ADMIN — INDOMETHACIN 50 MEQ: 25 CAPSULE ORAL at 01:03

## 2024-03-07 RX ADMIN — PANTOPRAZOLE SODIUM 40 MG: 40 INJECTION, POWDER, FOR SOLUTION INTRAVENOUS at 04:03

## 2024-03-07 RX ADMIN — AMIODARONE HYDROCHLORIDE 1 MG/MIN: 1.8 INJECTION, SOLUTION INTRAVENOUS at 02:03

## 2024-03-07 RX ADMIN — FENTANYL CITRATE 100 MCG: 50 INJECTION INTRAMUSCULAR; INTRAVENOUS at 02:03

## 2024-03-07 RX ADMIN — LIDOCAINE HYDROCHLORIDE 100 MG: 20 INJECTION INTRAVENOUS at 12:03

## 2024-03-07 RX ADMIN — SODIUM BICARBONATE 50 MEQ: 84 INJECTION INTRAVENOUS at 01:03

## 2024-03-07 RX ADMIN — EPINEPHRINE 1 MG: 0.1 INJECTION INTRACARDIAC; INTRAVENOUS at 01:03

## 2024-03-07 RX ADMIN — SODIUM BICARBONATE 50 MEQ: 84 INJECTION, SOLUTION INTRAVENOUS at 01:03

## 2024-03-07 RX ADMIN — SODIUM BICARBONATE: 84 INJECTION, SOLUTION INTRAVENOUS at 06:03

## 2024-03-07 RX ADMIN — PHENYLEPHRINE HYDROCHLORIDE 200 MCG: 10 INJECTION INTRAVENOUS at 12:03

## 2024-03-07 RX ADMIN — AMIODARONE HYDROCHLORIDE 1 MG/MIN: 1.8 INJECTION, SOLUTION INTRAVENOUS at 01:03

## 2024-03-07 RX ADMIN — Medication 25 MCG/HR: at 02:03

## 2024-03-07 RX ADMIN — CHLORHEXIDINE GLUCONATE 0.12% ORAL RINSE 15 ML: 1.2 LIQUID ORAL at 09:03

## 2024-03-07 RX ADMIN — LEVETIRACETAM INJECTION 1000 MG: 10 INJECTION INTRAVENOUS at 09:03

## 2024-03-07 RX ADMIN — CALCIUM CHLORIDE 1 G: 100 INJECTION INTRAVENOUS; INTRAVENTRICULAR at 01:03

## 2024-03-07 RX ADMIN — CEFTRIAXONE 1 G: 1 INJECTION, POWDER, FOR SOLUTION INTRAMUSCULAR; INTRAVENOUS at 06:03

## 2024-03-07 RX ADMIN — AMIODARONE HYDROCHLORIDE 1 MG/MIN: 1.8 INJECTION, SOLUTION INTRAVENOUS at 06:03

## 2024-03-07 RX ADMIN — PROPOFOL 100 MG: 10 INJECTION, EMULSION INTRAVENOUS at 12:03

## 2024-03-07 NOTE — ASSESSMENT & PLAN NOTE
Cardiology following  Levo and amiodarone infusion  Careful hydration  CVL placed.   Primary rhythm at time of arrest Asystole  Keep temp 34-36

## 2024-03-07 NOTE — PROCEDURES
"Avril Monzon is a 79 y.o. female patient.    Temp: 98.1 °F (36.7 °C) (03/07/24 1154)  Pulse: (!) 122 (03/07/24 1346)  Resp: (!) 26 (03/07/24 1443)  BP: (!) 184/84 (03/07/24 1202)  SpO2: 96 % (03/07/24 1154)  Weight: 64.4 kg (142 lb) (03/07/24 1202)  Height: 5' 5" (165.1 cm) (03/07/24 1202)  Mallampati Scale: Class III  ASA Classification: Class 3    Central Line    Date/Time: 3/7/2024 3:11 PM    Performed by: Zoila Valadez MD  Authorized by: Zoila Valadez MD    Location procedure was performed:  [facility] INTENSIVE CARE UNIT  Consent Done ?:  Yes  Indications:  Hemodynamic monitoring and vascular access  Anesthesia:  Local infiltration  Local anesthetic:  Lidocaine 2% without epinephrine  Preparation:  Skin prepped with ChloraPrep  Skin prep agent dried: Skin prep agent completely dried prior to procedure    Sterile barriers: All five maximal sterile barriers used - gloves, gown, cap, mask and large sterile sheet    Hand hygiene: Hand hygiene performed immediately prior to central venous catheter insertion    Location:  Right internal jugular  Catheter type:  Triple lumen  Catheter size:  6 Fr  Ultrasound guidance: Yes    Vessel Caliber:  Medium  Comprressibility:  Normal  Needle advanced into vessel with real time ultrasound guidance.    Guidewire confirmed in vessel.    Image recorded and saved.    Steril sheath on probe.    Sterile gel used.  Manometry: No    Number of attempts:  1  Securement:  Line sutured, sterile dressing applied and blood return through all ports  Complications: No    Specimens: No    Implants: No    XRay:  Placement verified by x-ray  Adverse Events:  None      3/7/2024    "

## 2024-03-07 NOTE — OP NOTE
[facility] - Intensive Care (Hospital)  Brief Operative Note    Surgery Date: 3/7/2024     Surgeon(s) and Role:     * Colton Abreu MD - Primary    Assisting Surgeon: None    Pre-op Diagnosis:  Pulmonary hypertension [I27.20]  SOB (shortness of breath) [R06.02]    Post-op Diagnosis:  Post-Op Diagnosis Codes:     * Pulmonary hypertension [I27.20]     * SOB (shortness of breath) [R06.02]    Procedure(s) (LRB):  ECHOCARDIOGRAM, TRANSESOPHAGEAL (N/A)    Anesthesia: Monitor Anesthesia Care    Operative Findings:   Procedure Description: The risks, benefits, and alternatives of the procedure expalined in detail with patient and family. The patient voices understanding and all questions have been addressed. The patient agrees to proceed as planned.   The patient was sedated by anesthesiology service. The probe was advanced via throat into esophagus smoothly. The patient tolerated the procedure well and there was no complications. The probed was withdrawal at the end of study  Estimated Blood Loss: none.   Findings / Operative Note:   EF nml. Moderate MR and TR.  After EUNICE probe was withdrawal, pt's BP dropped to below 60 mmHG. At 12:55 pm, developed PEA. CPR immediately started. Pt was given epi and atropine. The BP elevated and developed afib with RVR. Pt was intubated and given amiodarone 150 mg ivp bolus. Sinus rhythm obtained. Pt was transferred to ICU.  Discussed and updated with her daughter.      Estimated Blood Loss: * No values recorded between 3/7/2024 12:46 PM and 3/7/2024 12:55 PM *         Specimens:   Specimen (24h ago, onward)      None

## 2024-03-07 NOTE — ASSESSMENT & PLAN NOTE
Creatine stable for now. BMP reviewed- noted Estimated Creatinine Clearance: 9.9 mL/min (A) (based on SCr of 4.3 mg/dL (H)). according to latest data. Based on current GFR, CKD stage is end stage.  Monitor UOP and serial BMP and adjust therapy as needed. Renally dose meds. Avoid nephrotoxic medications and procedures.  Nephrology consult.

## 2024-03-07 NOTE — H&P
[facility] - Intensive Care ([facility])  Critical Care Medicine  History & Physical    Patient Name: Avril Monzon  MRN: [id number]  Admission Date: 3/7/2024  Hospital Length of Stay: 0 days  Code Status: Full Code  Attending Physician: Zoila Valadez MD   Primary Care Provider: Rose Germain MD   Principal Problem: <principal problem not specified>    Subjective:     HPI:  79yr old with CMP ef 45%, Severe pulmonary HTN PAP > 85, Valvular heart disease, DM, ESRD, CVA, Retinopathy was getting outpatient EUNICE.  She was given diprivan for the procedure and post procedure remained unresponsive, then during EKD went from tachycardia to asystole.   She was given one roud of Epi, several boluses of vasopressors by anesthesia and was resuscitated for a few minutes. Intubated and bough to ICU.   In ICU CVL was placed and she was placed on levo. She had two more VT / VF episodes requiring CPR, Amiodarone bolus was given in the previous arrest and amiodarone infusion was initiated.   An A line was placed and CT head and other orders were placed. Family was updated by Cardiology. She was also given Ca and bicarbonate times 2 during resuscitation.     Hospital/ICU Course:  No notes on file     Past Medical History:   Diagnosis Date    Abnormal nuclear stress test 02/12/2024    Acute hypoxemic respiratory failure 11/26/2018    Anemia     Arthritis     back, hand, knees    Back pain     Cataract     CKD stage G4/A3, GFR 15 - 29 and albumin creatinine ratio >300 mg/g     GFR 40% Jan 2014 and 33% ub 3/2014 (BRG)    Coronary artery disease involving native coronary artery of native heart 11/30/2018    Diabetic retinopathy     DM (diabetes mellitus) type II controlled, neurological manifestation     Exudative age-related macular degeneration of left eye with active choroidal neovascularization 02/05/2019    GERD (gastroesophageal reflux disease)     Glaucoma     Heart failure     Hyperlipidemia     Hypertension     Non-ST  elevation MI (NSTEMI) 11/28/2018    NSTEMI (non-ST elevated myocardial infarction) 11/27/2018    Peripheral neuropathy     Polyneuropathy     Proteinuria     >6 mo     Seizures     BRG 1/2014 -dick CT NRI showed small vessel    Skin ulcer of toe of right foot with fat layer exposed 10/14/2022    Stroke 2012,2014    Tobacco dependence     Type 2 diabetes with peripheral circulatory disorder, controlled        Past Surgical History:   Procedure Laterality Date    ANGIOGRAM, LOWER ARTERIAL, UNILATERAL  2/13/2024    Procedure: Angiogram, Lower Arterial, Unilateral;  Surgeon: Michelle Holman MD;  Location: [facility] CATH [facility];  Service: Cardiology;;    ARTERIOGRAPHY OF SUBCLAVIAN ARTERY  2/13/2024    Procedure: ARTERIOGRAM, SUBCLAVIAN;  Surgeon: Michelle Holman MD;  Location: [facility] CATH [facility];  Service: Cardiology;;    BREAST LUMPECTOMY Left     benign, when patient was 15 years old    BREAST MASS EXCISION      ,benign, age 15.    CATHETERIZATION OF BOTH LEFT AND RIGHT HEART N/A 11/28/2018    Procedure: CATHETERIZATION, HEART, BOTH LEFT AND RIGHT;  Surgeon: Michelle Holman MD;  Location: [facility] CATH [facility];  Service: Cardiology;  Laterality: N/A;    CATHETERIZATION OF BOTH LEFT AND RIGHT HEART N/A 11/30/2018    Procedure: CATHETERIZATION, HEART, BOTH LEFT AND RIGHT;  Surgeon: Michelle Holman MD;  Location: [facility];  Service: Cardiology;  Laterality: N/A;    CORONARY ARTERY BYPASS GRAFT      CORONARY ARTERY BYPASS GRAFT (CABG) N/A 7/20/2022    Procedure: CORONARY ARTERY BYPASS GRAFT (CABG);  Surgeon: Sanju Locke MD;  Location: [facility] OR;  Service: Cardiothoracic;  Laterality: N/A;  2-VESSEL PUMP ASSIST WITH EPI-AORTIC ULTRASOUND    CORONARY BYPASS GRAFT ANGIOGRAPHY  2/13/2024    Procedure: Bypass graft study;  Surgeon: Michelle Holman MD;  Location: [facility];  Service: Cardiology;;    ENDOSCOPIC HARVEST OF VEIN Left 7/20/2022    Procedure: SURGICAL PROCUREMENT, VEIN, ENDOSCOPIC;  Surgeon: Sanju Locke MD;   Location: [facility] OR;  Service: Cardiothoracic;  Laterality: Left;    HYSTERECTOMY  1982    INJECTION OF ANESTHETIC AGENT AROUND MULTIPLE INTERCOSTAL NERVES N/A 7/20/2022    Procedure: BLOCK, NERVE, INTERCOSTAL, 2 OR MORE;  Surgeon: Sanju Locke MD;  Location: [facility] OR;  Service: Cardiothoracic;  Laterality: N/A;  PARASTERNAL NERVE BLOCK    INSERTION OF SHUNT      LEFT HEART CATHETERIZATION Left 12/3/2018    Procedure: CATHETERIZATION, HEART, LEFT;  Surgeon: Michelle Holman MD;  Location: [facility] CATH LAB;  Service: Cardiology;  Laterality: Left;  LAD ATHERECTOMY STENT/ FEMORAL APPROACH    LEFT HEART CATHETERIZATION Left 7/13/2022    Procedure: CATHETERIZATION, HEART, LEFT;  Surgeon: Abhi Sloan MD;  Location: [facility] CATH LAB;  Service: Cardiology;  Laterality: Left;    LEFT HEART CATHETERIZATION Left 2/13/2024    Procedure: Left heart cath;  Surgeon: Michelle Holman MD;  Location: [facility] CATH LAB;  Service: Cardiology;  Laterality: Left;    OOPHORECTOMY      RIGHT HEART CATHETERIZATION Right 2/13/2024    Procedure: INSERTION, CATHETER, RIGHT HEART;  Surgeon: Michelle Holman MD;  Location: [facility] CATH LAB;  Service: Cardiology;  Laterality: Right;    wrist cyst Right 1980s    dorsal wrist cyst       Review of patient's allergies indicates:   Allergen Reactions    Norvasc [amlodipine] Shortness Of Breath    Codeine Swelling     Other Reaction(s): Itchy skin eruption (code-84622438,SNOMED-CT)    Propoxyphene Swelling     Other Reaction(s): Itchy skin eruption (code-51462678,SNOMED-CT)    Atorvastatin      Muscle twitching       Family History       Problem Relation (Age of Onset)    Cancer Maternal Grandmother    Diabetes Mother    Heart disease Mother    Hyperlipidemia Mother    Hypertension Mother    Muscular dystrophy Son          Tobacco Use    Smoking status: Former     Current packs/day: 0.00     Average packs/day: 1.5 packs/day for 30.0 years (45.0 ttl pk-yrs)     Types: Cigarettes     Start date: 1/9/1960      Quit date: 1990     Years since quittin.1    Smokeless tobacco: Former   Substance and Sexual Activity    Alcohol use: No     Alcohol/week: 0.0 standard drinks of alcohol    Drug use: No    Sexual activity: Not Currently         Review of Systems   Unable to perform ROS: Acuity of condition   All other systems reviewed and are negative.    Objective:     Vital Signs (Most Recent):  Temp: 98.1 °F (36.7 °C) (24 1154)  Pulse: (!) 122 (24 1346)  Resp: (!) 26 (24 1443)  BP: (!) 184/84 (24 1202)  SpO2: 96 % (24 1154) Vital Signs (24h Range):  Temp:  [98.1 °F (36.7 °C)] 98.1 °F (36.7 °C)  Pulse:  [] 122  Resp:  [10-26] 26  SpO2:  [96 %] 96 %  BP: (184)/(84) 184/84     Weight: 64.4 kg (142 lb)  Body mass index is 23.63 kg/m².      Intake/Output Summary (Last 24 hours) at 3/7/2024 1453  Last data filed at 3/7/2024 1313  Gross per 24 hour   Intake 600 ml   Output --   Net 600 ml        Physical Exam  Vitals and nursing note reviewed.   Constitutional:       General: She is in acute distress.      Appearance: She is ill-appearing. She is not toxic-appearing or diaphoretic.   HENT:      Head: Normocephalic.   Cardiovascular:      Rate and Rhythm: Tachycardia present.   Pulmonary:      Effort: Respiratory distress present.   Abdominal:      Palpations: Abdomen is soft.          Vents:  Vent Mode: A/C (24)  Set Rate: 22 BPM (24)  Vt Set: 395 mL (24)  PEEP/CPAP: 5 cmH20 (24)  Oxygen Concentration (%): 100 (24 1333)  Peak Airway Pressure: 33 cmH20 (24 1333)  Plateau Pressure: 0 cmH20 (24)  Total Ve: 8.75 L/m (24)  Negative Inspiratory Force (cm H2O): 0 (24 1333)  F/VT Ratio<105 (RSBI): (!) 30.15 (24 1333)    Lines/Drains/Airways       Central Venous Catheter Line  Duration             Percutaneous Central Line - Triple Lumen  24 1347 Internal Jugular Right <1 day              Airway  Duration  "                 Airway - Non-Surgical 03/07/24 1259 <1 day         Airway - Non-Surgical 03/07/24 1300 Endotracheal Tube <1 day              Peripheral Intravenous Line  Duration                  Hemodialysis AV Fistula 04/01/20 2203 Left upper arm 1435 days         Peripheral IV - Single Lumen 03/07/24 1210 20 G Posterior;Right Hand <1 day                    Significant Labs:    CBC/Anemia Profile:  Recent Labs   Lab 03/07/24  1402 03/07/24  1427   WBC  --  8.38   HGB  --  9.9*   HCT 30* 31.5*   PLT  --  214   MCV  --  101*   RDW  --  14.2        Chemistries:  Recent Labs   Lab 03/07/24  1145      K 3.9      CO2 25   BUN 18   CREATININE 4.1*   CALCIUM 9.7       All pertinent labs within the past 24 hours have been reviewed.    Significant Imaging:   I have reviewed all pertinent imaging results/findings within the past 24 hours.  Assessment/Plan:     Cardiac/Vascular  Cardiac arrest  Cardiology following  Levo and amiodarone infusion  Careful hydration  CVL placed.   Primary rhythm at time of arrest Asystole  Keep temp 34-36      Acute on chronic combined systolic and diastolic heart failure  See orders.      Pulmonary hypertension  Severe PAP 88mmHg    Renal/  ESRD (end stage renal disease)  Creatine stable for now. BMP reviewed- noted Estimated Creatinine Clearance: 9.9 mL/min (A) (based on SCr of 4.3 mg/dL (H)). according to latest data. Based on current GFR, CKD stage is end stage.  Monitor UOP and serial BMP and adjust therapy as needed. Renally dose meds. Avoid nephrotoxic medications and procedures.  Nephrology consult.    Endocrine  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, with long-term current use of insulin  Patient's FSGs are controlled on current medication regimen.  Last A1c reviewed-   Lab Results   Component Value Date    HGBA1C 6.9 (H) 12/05/2023     Most recent fingerstick glucose reviewed- No results for input(s): "POCTGLUCOSE" in the last 24 " hours.  Current correctional scale  High  Maintain anti-hyperglycemic dose as follows-   Antihyperglycemics (From admission, onward)      None          Hold Oral hypoglycemics while patient is in the hospital.    SSI and frequent accucheck        Critical Care Daily Checklist:    A: Awake: RASS Goal/Actual Goal:    Actual:     B: Spontaneous Breathing Trial Performed?     C: SAT & SBT Coordinated?  Yes when able                      D: Delirium: CAM-ICU     E: Early Mobility Performed? Yes when able   F: Feeding Goal:    Status:     Current Diet Order   Procedures    Diet NPO      AS: Analgesia/Sedation yes   T: Thromboembolic Prophylaxis yes   H: HOB > 300 yes   U: Stress Ulcer Prophylaxis (if needed) yes   G: Glucose Control yes   B: Bowel Function     I: Indwelling Catheter (Lines & Pfeiffer) Necessity yes   D: De-escalation of Antimicrobials/Pharmacotherapies yes    Plan for the day/ETD yes    Code Status:  Family/Goals of Care: Full Code  yes     Critical Care Time: 45 minutes  Critical secondary to Patient has a condition that poses threat to life and bodily function: Acute Myocardial Infarction    Critical care was time spent personally by me on the following activities: development of treatment plan with patient or surrogate and bedside caregivers, discussions with consultants, evaluation of patient's response to treatment, examination of patient, ordering and performing treatments and interventions, ordering and review of laboratory studies, ordering and review of radiographic studies, pulse oximetry, re-evaluation of patient's condition. This critical care time did not overlap with that of any other provider or involve time for any procedures.     Zoila Valadez MD  Critical Care Medicine  [facility] - Intensive Care ([facility])

## 2024-03-07 NOTE — SUBJECTIVE & OBJECTIVE
Past Medical History:   Diagnosis Date    Abnormal nuclear stress test 02/12/2024    Acute hypoxemic respiratory failure 11/26/2018    Anemia     Arthritis     back, hand, knees    Back pain     Cataract     CKD stage G4/A3, GFR 15 - 29 and albumin creatinine ratio >300 mg/g     GFR 40% Jan 2014 and 33% ub 3/2014 (BRG)    Coronary artery disease involving native coronary artery of native heart 11/30/2018    Diabetic retinopathy     DM (diabetes mellitus) type II controlled, neurological manifestation     Exudative age-related macular degeneration of left eye with active choroidal neovascularization 02/05/2019    GERD (gastroesophageal reflux disease)     Glaucoma     Heart failure     Hyperlipidemia     Hypertension     Non-ST elevation MI (NSTEMI) 11/28/2018    NSTEMI (non-ST elevated myocardial infarction) 11/27/2018    Peripheral neuropathy     Polyneuropathy     Proteinuria     >6 mo     Seizures     BRG 1/2014 -dick CT NRI showed small vessel    Skin ulcer of toe of right foot with fat layer exposed 10/14/2022    Stroke 2012,2014    Tobacco dependence     Type 2 diabetes with peripheral circulatory disorder, controlled        Past Surgical History:   Procedure Laterality Date    ANGIOGRAM, LOWER ARTERIAL, UNILATERAL  2/13/2024    Procedure: Angiogram, Lower Arterial, Unilateral;  Surgeon: Michelle Holman MD;  Location: [facility] CATH LAB;  Service: Cardiology;;    ARTERIOGRAPHY OF SUBCLAVIAN ARTERY  2/13/2024    Procedure: ARTERIOGRAM, SUBCLAVIAN;  Surgeon: Michelle Holman MD;  Location: [facility] CATH LAB;  Service: Cardiology;;    BREAST LUMPECTOMY Left     benign, when patient was 15 years old    BREAST MASS EXCISION      ,benign, age 15.    CATHETERIZATION OF BOTH LEFT AND RIGHT HEART N/A 11/28/2018    Procedure: CATHETERIZATION, HEART, BOTH LEFT AND RIGHT;  Surgeon: Michelle Holman MD;  Location: [facility] CATH LAB;  Service: Cardiology;  Laterality: N/A;    CATHETERIZATION OF BOTH LEFT AND RIGHT HEART N/A 11/30/2018     Procedure: CATHETERIZATION, HEART, BOTH LEFT AND RIGHT;  Surgeon: Michelle Holman MD;  Location: [facility] CATH LAB;  Service: Cardiology;  Laterality: N/A;    CORONARY ARTERY BYPASS GRAFT      CORONARY ARTERY BYPASS GRAFT (CABG) N/A 7/20/2022    Procedure: CORONARY ARTERY BYPASS GRAFT (CABG);  Surgeon: Sanju Locke MD;  Location: [facility];  Service: Cardiothoracic;  Laterality: N/A;  2-VESSEL PUMP ASSIST WITH EPI-AORTIC ULTRASOUND    CORONARY BYPASS GRAFT ANGIOGRAPHY  2/13/2024    Procedure: Bypass graft study;  Surgeon: Michelle Holman MD;  Location: [facility] CATH LAB;  Service: Cardiology;;    ENDOSCOPIC HARVEST OF VEIN Left 7/20/2022    Procedure: SURGICAL PROCUREMENT, VEIN, ENDOSCOPIC;  Surgeon: Sanju Locke MD;  Location: [facility];  Service: Cardiothoracic;  Laterality: Left;    HYSTERECTOMY  1982    INJECTION OF ANESTHETIC AGENT AROUND MULTIPLE INTERCOSTAL NERVES N/A 7/20/2022    Procedure: BLOCK, NERVE, INTERCOSTAL, 2 OR MORE;  Surgeon: Sanju Locke MD;  Location: [facility];  Service: Cardiothoracic;  Laterality: N/A;  PARASTERNAL NERVE BLOCK    INSERTION OF SHUNT      LEFT HEART CATHETERIZATION Left 12/3/2018    Procedure: CATHETERIZATION, HEART, LEFT;  Surgeon: Michelle Holman MD;  Location: [facility] CATH LAB;  Service: Cardiology;  Laterality: Left;  LAD ATHERECTOMY STENT/ FEMORAL APPROACH    LEFT HEART CATHETERIZATION Left 7/13/2022    Procedure: CATHETERIZATION, HEART, LEFT;  Surgeon: Abhi Sloan MD;  Location: [facility] CATH LAB;  Service: Cardiology;  Laterality: Left;    LEFT HEART CATHETERIZATION Left 2/13/2024    Procedure: Left heart cath;  Surgeon: Michelle Holman MD;  Location: [facility] CATH LAB;  Service: Cardiology;  Laterality: Left;    OOPHORECTOMY      RIGHT HEART CATHETERIZATION Right 2/13/2024    Procedure: INSERTION, CATHETER, RIGHT HEART;  Surgeon: Michelle Holman MD;  Location: [facility] CATH LAB;  Service: Cardiology;  Laterality: Right;    wrist cyst Right 1980s    dorsal wrist cyst        Review of patient's allergies indicates:   Allergen Reactions    Norvasc [amlodipine] Shortness Of Breath    Codeine Swelling     Other Reaction(s): Itchy skin eruption (code-72774125,SNOMED-CT)    Propoxyphene Swelling     Other Reaction(s): Itchy skin eruption (code-62601842,SNOMED-CT)    Atorvastatin      Muscle twitching       Family History       Problem Relation (Age of Onset)    Cancer Maternal Grandmother    Diabetes Mother    Heart disease Mother    Hyperlipidemia Mother    Hypertension Mother    Muscular dystrophy Son          Tobacco Use    Smoking status: Former     Current packs/day: 0.00     Average packs/day: 1.5 packs/day for 30.0 years (45.0 ttl pk-yrs)     Types: Cigarettes     Start date: 1960     Quit date: 1990     Years since quittin.1    Smokeless tobacco: Former   Substance and Sexual Activity    Alcohol use: No     Alcohol/week: 0.0 standard drinks of alcohol    Drug use: No    Sexual activity: Not Currently         Review of Systems   Unable to perform ROS: Acuity of condition   All other systems reviewed and are negative.    Objective:     Vital Signs (Most Recent):  Temp: 98.1 °F (36.7 °C) (24 1154)  Pulse: (!) 122 (24 1346)  Resp: (!) 26 (24 1443)  BP: (!) 184/84 (24 1202)  SpO2: 96 % (24 1154) Vital Signs (24h Range):  Temp:  [98.1 °F (36.7 °C)] 98.1 °F (36.7 °C)  Pulse:  [] 122  Resp:  [10-26] 26  SpO2:  [96 %] 96 %  BP: (184)/(84) 184/84     Weight: 64.4 kg (142 lb)  Body mass index is 23.63 kg/m².      Intake/Output Summary (Last 24 hours) at 3/7/2024 1453  Last data filed at 3/7/2024 1313  Gross per 24 hour   Intake 600 ml   Output --   Net 600 ml        Physical Exam  Vitals and nursing note reviewed.   Constitutional:       General: She is in acute distress.      Appearance: She is ill-appearing. She is not toxic-appearing or diaphoretic.   HENT:      Head: Normocephalic.   Cardiovascular:      Rate and Rhythm: Tachycardia  present.   Pulmonary:      Effort: Respiratory distress present.   Abdominal:      Palpations: Abdomen is soft.          Vents:  Vent Mode: A/C (03/07/24 1333)  Set Rate: 22 BPM (03/07/24 1333)  Vt Set: 395 mL (03/07/24 1333)  PEEP/CPAP: 5 cmH20 (03/07/24 1333)  Oxygen Concentration (%): 100 (03/07/24 1333)  Peak Airway Pressure: 33 cmH20 (03/07/24 1333)  Plateau Pressure: 0 cmH20 (03/07/24 1333)  Total Ve: 8.75 L/m (03/07/24 1333)  Negative Inspiratory Force (cm H2O): 0 (03/07/24 1333)  F/VT Ratio<105 (RSBI): (!) 30.15 (03/07/24 1333)    Lines/Drains/Airways       Central Venous Catheter Line  Duration             Percutaneous Central Line - Triple Lumen  03/07/24 1347 Internal Jugular Right <1 day              Airway  Duration                  Airway - Non-Surgical 03/07/24 1259 <1 day         Airway - Non-Surgical 03/07/24 1300 Endotracheal Tube <1 day              Peripheral Intravenous Line  Duration                  Hemodialysis AV Fistula 04/01/20 2203 Left upper arm 1435 days         Peripheral IV - Single Lumen 03/07/24 1210 20 G Posterior;Right Hand <1 day                    Significant Labs:    CBC/Anemia Profile:  Recent Labs   Lab 03/07/24  1402 03/07/24  1427   WBC  --  8.38   HGB  --  9.9*   HCT 30* 31.5*   PLT  --  214   MCV  --  101*   RDW  --  14.2        Chemistries:  Recent Labs   Lab 03/07/24  1145      K 3.9      CO2 25   BUN 18   CREATININE 4.1*   CALCIUM 9.7       All pertinent labs within the past 24 hours have been reviewed.    Significant Imaging:   I have reviewed all pertinent imaging results/findings within the past 24 hours.

## 2024-03-07 NOTE — ANESTHESIA PROCEDURE NOTES
Intubation    Date/Time: 3/7/2024 12:59 PM    Performed by: Adryan Gutierrez CRNA  Authorized by: Fletcher Webber MD    Intubation:     Mask Ventilation:  Easy with oral airway    Attempts:  1    Attempted By:  CRNA    Method of Intubation:  Video laryngoscopy    Blade:  [place] 3    Laryngeal View Grade: Grade I - full view of cords      Difficult Airway Encountered?: No      Complications:  None    Airway Device:  Oral endotracheal tube    Airway Device Size:  7.0    Style/Cuff Inflation:  Cuffed (inflated to minimal occlusive pressure)    Tube secured:  21    Secured at:  The lips    Placement Verified By:  Colorimetric ETCO2 device    Complicating Factors:  None    Findings Post-Intubation:  BS equal bilateral and atraumatic/condition of teeth unchanged

## 2024-03-07 NOTE — ANESTHESIA POSTPROCEDURE EVALUATION
Anesthesia Post Evaluation    Patient: Avril Monzon    Procedure(s) Performed: Procedure(s) (LRB):  ECHOCARDIOGRAM, TRANSESOPHAGEAL (N/A)    Final Anesthesia Type: MAC      Patient location during evaluation: ICU  Patient participation: No - Unable to Participate, Intubation  Post-procedure vital signs: reviewed and not stable  Pain management: adequate  Airway patency: patent    PONV status at discharge: No PONV  Anesthetic complications: yes  Perioperative Events: hypotension requiring unaticipated pressor infusion, unplanned ICU admission and cardiac arrest        Cardiovascular status: hemodynamically unstable  Respiratory status: ETT, intubated and ventilator  Hydration status: euvolemic  Follow-up not needed.                No case tracking events are documented in the log.      Pain/Griffin Score: Pain Rating Prior to Med Admin: 2 (3/7/2024  2:51 PM)  Griffin Score: 10 (3/7/2024 12:02 PM)

## 2024-03-07 NOTE — ASSESSMENT & PLAN NOTE
"Patient's FSGs are controlled on current medication regimen.  Last A1c reviewed-   Lab Results   Component Value Date    HGBA1C 6.9 (H) 12/05/2023     Most recent fingerstick glucose reviewed- No results for input(s): "POCTGLUCOSE" in the last 24 hours.  Current correctional scale  High  Maintain anti-hyperglycemic dose as follows-   Antihyperglycemics (From admission, onward)      None          Hold Oral hypoglycemics while patient is in the hospital.    SSI and frequent accucheck  "

## 2024-03-07 NOTE — ANESTHESIA PREPROCEDURE EVALUATION
03/07/2024  Avril Monzon is a 79 y.o., female.      Pre-op Assessment    I have reviewed the Patient Summary Reports.     I have reviewed the Nursing Notes. I have reviewed the NPO Status.   I have reviewed the Medications.     Review of Systems  Anesthesia Hx:  No problems with previous Anesthesia             Denies Family Hx of Anesthesia complications.    Denies Personal Hx of Anesthesia complications.                    Social:  Former Smoker, No Alcohol Use       Hematology/Oncology:    Oncology Normal    -- Anemia:                                  EENT/Dental:  chronic allergic rhinitis Cataract  Exudative age-related macular degeneration of left eye with active choroidal neovascularization  Diabetic retinopathy  Glaucoma            Cardiovascular:  Exercise tolerance: poor   Hypertension Valvular problems/Murmurs Past MI CAD   CABG/stent    CHF    hyperlipidemia   ECG has been reviewed. Chronic combined systolic and diastolic heart failure  Syncope    EKG 3/2024:  Normal sinus rhythm 62  Left posterior fascicular block   Nonspecific ST and T wave abnormality   Prolonged QT   Abnormal ECG   When compared with ECG of 30-JAN-2024 13:35,   Left posterior fascicular block is now Present   Non-specific change in ST segment in Lateral leads   Inverted T waves have replaced nonspecific T wave abnormality in Anterior   leads   T wave inversion no longer evident in Lateral leads     Echo 1/2024:    Left Ventricle: The left ventricle is normal in size. Normal wall thickness. Normal wall motion. There is normal systolic function with a visually estimated ejection fraction of 55 - 60%. Grade II diastolic dysfunction.  ·  Right Ventricle: Normal right ventricular cavity size. Wall thickness is normal. Right ventricle wall motion  is normal. Systolic function is normal.  ·  Mitral Valve: There is moderate  regurgitation.  ·  Tricuspid Valve: There is moderate regurgitation.  ·  Pulmonary Artery: The estimated pulmonary artery systolic pressure is 88 mmHg.  ·  IVC/SVC: Intermediate venous pressure at 8 mmHg.                             Pulmonary:    Denies COPD.  Denies Asthma.  Shortness of breath   Denies Sleep Apnea. Covid  Pulmonary HTN               Renal/:  Chronic Renal Disease, ESRD, Dialysis   M,W,F HD sched.             Hepatic/GI:     GERD Denies Liver Disease.  Denies Hepatitis.           Musculoskeletal:  Arthritis   DDD            Neurological:   CVA    Seizures    Hemiplegia      Peripheral Neuropathy                          Endocrine:  Diabetes, type 2 Denies Hypothyroidism.  Denies Hyperthyroidism. Hyperparathyroidism            Physical Exam  General: Well nourished    Airway:  Mallampati: II   Mouth Opening: Normal    Dental:  Dentures    Chest/Lungs:  Normal Respiratory Rate, Clear to auscultation    Heart:  Rate: Normal  Rhythm: Regular Rhythm    Skin:  Upper l arm HD access with + bruit    Anesthesia Plan  Type of Anesthesia, risks & benefits discussed:    Anesthesia Type: MAC  Intra-op Monitoring Plan: Standard ASA Monitors  Induction:  IV  Informed Consent: Informed consent signed with the Patient and all parties understand the risks and agree with anesthesia plan.  All questions answered.   ASA Score: 3  Day of Surgery Review of History & Physical: H&P Update referred to the surgeon/provider.    Ready For Surgery From Anesthesia Perspective.   .

## 2024-03-07 NOTE — EICU
Intervention Initiated From:  Bedside    Reta intervened regarding:  Documentation    Nurse Notified:  Yes    Doctor Notified:  Yes    Comments:Called remotely into patients room at 1343 with CPR in progress.  ETT placed prior to transfer to ICU.     1343 EPi 1mg IVP given    1344  Vtach on pulse check- pads being applied    1345 Defibrillated with 200joules    1345 Sodium Bicarb 50meq IVP given    1346 Epinephrine 1mg given    1346:50 Rosc achieved ST rate 122.

## 2024-03-07 NOTE — Clinical Note
See anesthesia documentation for vital signs. Transferred to ICU on transport monitor accompanied by ICU nurse, CRNA and respiratory therapist. Family notified in waiting room by Dr. Abreu.

## 2024-03-07 NOTE — HPI
79yr old with CMP ef 45%, Severe pulmonary HTN PAP > 85, Valvular heart disease, DM, ESRD, CVA, Retinopathy was getting outpatient EUNICE.  She was given diprivan for the procedure and post procedure remained unresponsive, then during EKG went from tachycardia to asystole.   She was given one roud of Epi, several boluses of vasopressors by anesthesia and was resuscitated for a few minutes. Intubated and bough to ICU.   In ICU CVL was placed and she was placed on levo. She had two more VT / VF episodes requiring CPR, Amiodarone bolus was given in the previous arrest and amiodarone infusion was initiated.   An A line was placed and CT head and other orders were placed. Family was updated by Cardiology. She was also given Ca and bicarbonate times 2 during resuscitation.

## 2024-03-07 NOTE — TRANSFER OF CARE
"Anesthesia Transfer of Care Note    Patient: Avril Monzon    Procedure(s) Performed: Procedure(s) (LRB):  ECHOCARDIOGRAM, TRANSESOPHAGEAL (N/A)    Patient location: ICU    Anesthesia Type: MAC    Transport from OR: Continuous SpO2 monitoring in transport. Continuous ECG monitoring in transport. Transported from OR intubated on 100% O2 by AMBU with adequate controlled ventilation    Post pain: adequate analgesia    Post vital signs: stable    Level of consciousness: unresponsive    Nausea/Vomiting: no nausea/vomiting    Complications: cardiovascular complications    Transfer of care protocol was followed    Last vitals: Visit Vitals  BP (!) 184/84 (BP Location: Right arm)   Pulse 77   Temp 36.7 °C (98.1 °F) (Temporal)   Resp 20   Ht 5' 5" (1.651 m)   Wt 64.4 kg (142 lb)   LMP  (LMP Unknown)   SpO2 96%   Breastfeeding No   BMI 23.63 kg/m²     "

## 2024-03-08 PROBLEM — G93.1 ANOXIC BRAIN INJURY: Status: ACTIVE | Noted: 2024-03-08

## 2024-03-08 PROBLEM — G40.909 EPILEPSY: Status: ACTIVE | Noted: 2024-03-08

## 2024-03-08 PROBLEM — Z71.89 GOALS OF CARE, COUNSELING/DISCUSSION: Status: ACTIVE | Noted: 2023-01-25

## 2024-03-08 PROBLEM — G25.3 MYOCLONIC JERKING: Status: ACTIVE | Noted: 2021-12-20

## 2024-03-08 PROBLEM — G93.1 ANOXIC ENCEPHALOPATHY: Status: ACTIVE | Noted: 2024-03-08

## 2024-03-08 LAB
ALLENS TEST: ABNORMAL
ANION GAP SERPL CALC-SCNC: 15 MMOL/L (ref 8–16)
BASOPHILS # BLD AUTO: 0.02 K/UL (ref 0–0.2)
BASOPHILS NFR BLD: 0.2 % (ref 0–1.9)
BSA FOR ECHO PROCEDURE: 1.72 M2
BUN SERPL-MCNC: 31 MG/DL (ref 8–23)
CALCIUM SERPL-MCNC: 8.9 MG/DL (ref 8.7–10.5)
CHLORIDE SERPL-SCNC: 98 MMOL/L (ref 95–110)
CO2 SERPL-SCNC: 24 MMOL/L (ref 23–29)
CREAT SERPL-MCNC: 4.8 MG/DL (ref 0.5–1.4)
DELSYS: ABNORMAL
DIFFERENTIAL METHOD BLD: ABNORMAL
EOSINOPHIL # BLD AUTO: 0 K/UL (ref 0–0.5)
EOSINOPHIL NFR BLD: 0 % (ref 0–8)
ERYTHROCYTE [DISTWIDTH] IN BLOOD BY AUTOMATED COUNT: 13.8 % (ref 11.5–14.5)
ERYTHROCYTE [SEDIMENTATION RATE] IN BLOOD BY WESTERGREN METHOD: 24 MM/H
EST. GFR  (NO RACE VARIABLE): 9 ML/MIN/1.73 M^2
ESTIMATED AVG GLUCOSE: 128 MG/DL (ref 68–131)
FIO2: 40
GLUCOSE SERPL-MCNC: 224 MG/DL (ref 70–110)
HBA1C MFR BLD: 6.1 % (ref 4–5.6)
HCO3 UR-SCNC: 24.7 MMOL/L (ref 24–28)
HCT VFR BLD AUTO: 26.7 % (ref 37–48.5)
HGB BLD-MCNC: 9 G/DL (ref 12–16)
IMM GRANULOCYTES # BLD AUTO: 0.05 K/UL (ref 0–0.04)
IMM GRANULOCYTES NFR BLD AUTO: 0.5 % (ref 0–0.5)
LACTATE SERPL-SCNC: 1.8 MMOL/L (ref 0.5–2.2)
LYMPHOCYTES # BLD AUTO: 0.7 K/UL (ref 1–4.8)
LYMPHOCYTES NFR BLD: 6.2 % (ref 18–48)
MAGNESIUM SERPL-MCNC: 1.7 MG/DL (ref 1.6–2.6)
MCH RBC QN AUTO: 31.8 PG (ref 27–31)
MCHC RBC AUTO-ENTMCNC: 33.7 G/DL (ref 32–36)
MCV RBC AUTO: 94 FL (ref 82–98)
MODE: ABNORMAL
MONOCYTES # BLD AUTO: 0.7 K/UL (ref 0.3–1)
MONOCYTES NFR BLD: 6.6 % (ref 4–15)
NEUTROPHILS # BLD AUTO: 9 K/UL (ref 1.8–7.7)
NEUTROPHILS NFR BLD: 86.5 % (ref 38–73)
NRBC BLD-RTO: 0 /100 WBC
PCO2 BLDA: 21.4 MMHG (ref 35–45)
PEEP: 5
PH SMN: 7.67 [PH] (ref 7.35–7.45)
PLATELET # BLD AUTO: 209 K/UL (ref 150–450)
PMV BLD AUTO: 9.7 FL (ref 9.2–12.9)
PO2 BLDA: 198 MMHG (ref 80–100)
POC BE: 4 MMOL/L
POC SATURATED O2: 100 % (ref 95–100)
POCT GLUCOSE: 180 MG/DL (ref 70–110)
POCT GLUCOSE: 199 MG/DL (ref 70–110)
POTASSIUM SERPL-SCNC: 3.6 MMOL/L (ref 3.5–5.1)
RBC # BLD AUTO: 2.83 M/UL (ref 4–5.4)
SAMPLE: ABNORMAL
SITE: ABNORMAL
SODIUM SERPL-SCNC: 137 MMOL/L (ref 136–145)
VT: 400
WBC # BLD AUTO: 10.43 K/UL (ref 3.9–12.7)

## 2024-03-08 PROCEDURE — 25000003 PHARM REV CODE 250: Mod: HCNC | Performed by: SPECIALIST

## 2024-03-08 PROCEDURE — C9113 INJ PANTOPRAZOLE SODIUM, VIA: HCPCS | Mod: HCNC | Performed by: NURSE PRACTITIONER

## 2024-03-08 PROCEDURE — 37799 UNLISTED PX VASCULAR SURGERY: CPT | Mod: HCNC

## 2024-03-08 PROCEDURE — 63600175 PHARM REV CODE 636 W HCPCS: Mod: HCNC | Performed by: SPECIALIST

## 2024-03-08 PROCEDURE — 83605 ASSAY OF LACTIC ACID: CPT | Mod: HCNC | Performed by: NURSE PRACTITIONER

## 2024-03-08 PROCEDURE — 99222 1ST HOSP IP/OBS MODERATE 55: CPT | Mod: HCNC,,, | Performed by: INTERNAL MEDICINE

## 2024-03-08 PROCEDURE — 94761 N-INVAS EAR/PLS OXIMETRY MLT: CPT | Mod: HCNC,XB

## 2024-03-08 PROCEDURE — 80048 BASIC METABOLIC PNL TOTAL CA: CPT | Mod: HCNC | Performed by: NURSE PRACTITIONER

## 2024-03-08 PROCEDURE — 99233 SBSQ HOSP IP/OBS HIGH 50: CPT | Mod: HCNC,,, | Performed by: INTERNAL MEDICINE

## 2024-03-08 PROCEDURE — 99900026 HC AIRWAY MAINTENANCE (STAT): Mod: HCNC

## 2024-03-08 PROCEDURE — 25000003 PHARM REV CODE 250: Mod: HCNC | Performed by: NURSE PRACTITIONER

## 2024-03-08 PROCEDURE — 82803 BLOOD GASES ANY COMBINATION: CPT | Mod: HCNC

## 2024-03-08 PROCEDURE — 99900035 HC TECH TIME PER 15 MIN (STAT): Mod: HCNC

## 2024-03-08 PROCEDURE — G0426 INPT/ED TELECONSULT50: HCPCS | Mod: HCNC,95,, | Performed by: PSYCHIATRY & NEUROLOGY

## 2024-03-08 PROCEDURE — 20000000 HC ICU ROOM: Mod: HCNC

## 2024-03-08 PROCEDURE — 63600175 PHARM REV CODE 636 W HCPCS: Mod: HCNC

## 2024-03-08 PROCEDURE — 63600175 PHARM REV CODE 636 W HCPCS: Mod: HCNC | Performed by: NURSE PRACTITIONER

## 2024-03-08 PROCEDURE — 85025 COMPLETE CBC W/AUTO DIFF WBC: CPT | Mod: HCNC | Performed by: NURSE PRACTITIONER

## 2024-03-08 PROCEDURE — 83735 ASSAY OF MAGNESIUM: CPT | Mod: HCNC | Performed by: NURSE PRACTITIONER

## 2024-03-08 PROCEDURE — 95822 EEG COMA OR SLEEP ONLY: CPT | Mod: 26,HCNC,, | Performed by: PSYCHIATRY & NEUROLOGY

## 2024-03-08 PROCEDURE — 27100171 HC OXYGEN HIGH FLOW UP TO 24 HOURS: Mod: HCNC

## 2024-03-08 PROCEDURE — 83036 HEMOGLOBIN GLYCOSYLATED A1C: CPT | Mod: HCNC | Performed by: NURSE PRACTITIONER

## 2024-03-08 PROCEDURE — 95822 EEG COMA OR SLEEP ONLY: CPT | Mod: HCNC

## 2024-03-08 PROCEDURE — 94003 VENT MGMT INPAT SUBQ DAY: CPT | Mod: HCNC

## 2024-03-08 RX ORDER — GLUCAGON 1 MG
1 KIT INJECTION
Status: DISCONTINUED | OUTPATIENT
Start: 2024-03-08 | End: 2024-03-12

## 2024-03-08 RX ORDER — INSULIN ASPART 100 [IU]/ML
0-10 INJECTION, SOLUTION INTRAVENOUS; SUBCUTANEOUS EVERY 6 HOURS PRN
Status: DISCONTINUED | OUTPATIENT
Start: 2024-03-08 | End: 2024-03-12

## 2024-03-08 RX ORDER — HEPARIN SODIUM 5000 [USP'U]/ML
5000 INJECTION, SOLUTION INTRAVENOUS; SUBCUTANEOUS EVERY 8 HOURS
Status: DISCONTINUED | OUTPATIENT
Start: 2024-03-08 | End: 2024-03-15

## 2024-03-08 RX ORDER — METOPROLOL TARTRATE 25 MG/1
25 TABLET, FILM COATED ORAL 2 TIMES DAILY
Status: DISCONTINUED | OUTPATIENT
Start: 2024-03-08 | End: 2024-03-09

## 2024-03-08 RX ORDER — PROPOFOL 10 MG/ML
0-50 INJECTION, EMULSION INTRAVENOUS CONTINUOUS
Status: DISCONTINUED | OUTPATIENT
Start: 2024-03-08 | End: 2024-03-10

## 2024-03-08 RX ORDER — ACETAMINOPHEN 650 MG/20.3ML
650 LIQUID ORAL EVERY 6 HOURS PRN
Status: DISCONTINUED | OUTPATIENT
Start: 2024-03-08 | End: 2024-03-16 | Stop reason: HOSPADM

## 2024-03-08 RX ORDER — FENTANYL CITRATE-0.9 % NACL/PF 10 MCG/ML
0-200 PLASTIC BAG, INJECTION (ML) INTRAVENOUS CONTINUOUS
Status: DISCONTINUED | OUTPATIENT
Start: 2024-03-08 | End: 2024-03-10

## 2024-03-08 RX ADMIN — CHLORHEXIDINE GLUCONATE 0.12% ORAL RINSE 15 ML: 1.2 LIQUID ORAL at 09:03

## 2024-03-08 RX ADMIN — NOREPINEPHRINE BITARTRATE 0.02 MCG/KG/MIN: 4 INJECTION, SOLUTION INTRAVENOUS at 02:03

## 2024-03-08 RX ADMIN — PROPOFOL 40 MCG/KG/MIN: 10 INJECTION, EMULSION INTRAVENOUS at 06:03

## 2024-03-08 RX ADMIN — AMIODARONE HYDROCHLORIDE 0.5 MG/MIN: 1.8 INJECTION, SOLUTION INTRAVENOUS at 05:03

## 2024-03-08 RX ADMIN — INSULIN ASPART 2 UNITS: 100 INJECTION, SOLUTION INTRAVENOUS; SUBCUTANEOUS at 11:03

## 2024-03-08 RX ADMIN — ACETAMINOPHEN 650 MG: 650 SOLUTION ORAL at 06:03

## 2024-03-08 RX ADMIN — PANTOPRAZOLE SODIUM 40 MG: 40 INJECTION, POWDER, FOR SOLUTION INTRAVENOUS at 08:03

## 2024-03-08 RX ADMIN — PROPOFOL 35 MCG/KG/MIN: 10 INJECTION, EMULSION INTRAVENOUS at 12:03

## 2024-03-08 RX ADMIN — LEVETIRACETAM INJECTION 1000 MG: 10 INJECTION INTRAVENOUS at 08:03

## 2024-03-08 RX ADMIN — PROPOFOL 40 MCG/KG/MIN: 10 INJECTION, EMULSION INTRAVENOUS at 10:03

## 2024-03-08 RX ADMIN — METOPROLOL TARTRATE 25 MG: 25 TABLET, FILM COATED ORAL at 09:03

## 2024-03-08 RX ADMIN — CHLORHEXIDINE GLUCONATE 0.12% ORAL RINSE 15 ML: 1.2 LIQUID ORAL at 08:03

## 2024-03-08 RX ADMIN — PROPOFOL 40 MCG/KG/MIN: 10 INJECTION, EMULSION INTRAVENOUS at 05:03

## 2024-03-08 RX ADMIN — INSULIN ASPART 2 UNITS: 100 INJECTION, SOLUTION INTRAVENOUS; SUBCUTANEOUS at 05:03

## 2024-03-08 RX ADMIN — MUPIROCIN: 20 OINTMENT TOPICAL at 09:03

## 2024-03-08 RX ADMIN — MUPIROCIN: 20 OINTMENT TOPICAL at 08:03

## 2024-03-08 RX ADMIN — PROPOFOL 40 MCG/KG/MIN: 10 INJECTION, EMULSION INTRAVENOUS at 11:03

## 2024-03-08 RX ADMIN — LEVETIRACETAM INJECTION 1000 MG: 10 INJECTION INTRAVENOUS at 09:03

## 2024-03-08 RX ADMIN — CEFTRIAXONE 1 G: 1 INJECTION, POWDER, FOR SOLUTION INTRAMUSCULAR; INTRAVENOUS at 05:03

## 2024-03-08 RX ADMIN — HEPARIN SODIUM 5000 UNITS: 5000 INJECTION INTRAVENOUS; SUBCUTANEOUS at 01:03

## 2024-03-08 RX ADMIN — HEPARIN SODIUM 5000 UNITS: 5000 INJECTION INTRAVENOUS; SUBCUTANEOUS at 10:03

## 2024-03-08 NOTE — CONSULTS
[facility] ([facility])  Nephrology  Consult Note    Patient Name: Avril Monzon  MRN: [id number]  Admission Date: 3/7/2024  Hospital Length of Stay: 1 days  Attending Provider: Zoila Valadez MD   Primary Care Physician: Rose Germain MD  Principal Problem:Cardiac arrest    Inpatient consult to Nephrology  Consult performed by: Adrian Ramirez MD  Consult ordered by: Jian Flowers NP  Reason for consult: End-stage renal disease        Subjective:     HPI:  79-year-old female with history of end-stage renal disease.  Admitted after having cardiac arrest during a transesophageal echocardiogram.  Nephrology has been consulted for maintenance of dialysis and end-stage renal disease related issues.  Patient was seen in the intensive care unit.  In bed resting comfortably.  Intubated on multiple drips.  There is a family member at the bedside.  All nephrology related questions were answered to her satisfaction.    Past Medical History:   Diagnosis Date    Abnormal nuclear stress test 02/12/2024    Acute hypoxemic respiratory failure 11/26/2018    Anemia     Arthritis     back, hand, knees    Back pain     Cataract     CKD stage G4/A3, GFR 15 - 29 and albumin creatinine ratio >300 mg/g     GFR 40% Jan 2014 and 33% ub 3/2014 ([facility])    Coronary artery disease involving native coronary artery of native heart 11/30/2018    Diabetic retinopathy     DM (diabetes mellitus) type II controlled, neurological manifestation     Exudative age-related macular degeneration of left eye with active choroidal neovascularization 02/05/2019    GERD (gastroesophageal reflux disease)     Glaucoma     Heart failure     Hyperlipidemia     Hypertension     Non-ST elevation MI (NSTEMI) 11/28/2018    NSTEMI (non-ST elevated myocardial infarction) 11/27/2018    Peripheral neuropathy     Polyneuropathy     Proteinuria     >6 mo     Seizures     BRG 1/2014 -dick CT NRI showed small vessel    Skin ulcer of toe of right  foot with fat layer exposed 10/14/2022    Stroke 2012,2014    Tobacco dependence     Type 2 diabetes with peripheral circulatory disorder, controlled        Past Surgical History:   Procedure Laterality Date    ANGIOGRAM, LOWER ARTERIAL, UNILATERAL  2/13/2024    Procedure: Angiogram, Lower Arterial, Unilateral;  Surgeon: Michelle Holman MD;  Location: [facility] CATH LAB;  Service: Cardiology;;    ARTERIOGRAPHY OF SUBCLAVIAN ARTERY  2/13/2024    Procedure: ARTERIOGRAM, SUBCLAVIAN;  Surgeon: Michelle Holman MD;  Location: [facility] CATH LAB;  Service: Cardiology;;    BREAST LUMPECTOMY Left     benign, when patient was 15 years old    BREAST MASS EXCISION      ,benign, age 15.    CATHETERIZATION OF BOTH LEFT AND RIGHT HEART N/A 11/28/2018    Procedure: CATHETERIZATION, HEART, BOTH LEFT AND RIGHT;  Surgeon: Michelle Holman MD;  Location: [facility] CATH LAB;  Service: Cardiology;  Laterality: N/A;    CATHETERIZATION OF BOTH LEFT AND RIGHT HEART N/A 11/30/2018    Procedure: CATHETERIZATION, HEART, BOTH LEFT AND RIGHT;  Surgeon: Michelle Holman MD;  Location: [facility] CATH LAB;  Service: Cardiology;  Laterality: N/A;    CORONARY ARTERY BYPASS GRAFT      CORONARY ARTERY BYPASS GRAFT (CABG) N/A 7/20/2022    Procedure: CORONARY ARTERY BYPASS GRAFT (CABG);  Surgeon: Sanju Locke MD;  Location: [facility];  Service: Cardiothoracic;  Laterality: N/A;  2-VESSEL PUMP ASSIST WITH EPI-AORTIC ULTRASOUND    CORONARY BYPASS GRAFT ANGIOGRAPHY  2/13/2024    Procedure: Bypass graft study;  Surgeon: Michelle Holman MD;  Location: [facility] CATH LAB;  Service: Cardiology;;    ENDOSCOPIC HARVEST OF VEIN Left 7/20/2022    Procedure: SURGICAL PROCUREMENT, VEIN, ENDOSCOPIC;  Surgeon: Sanju Locke MD;  Location: [facility] OR;  Service: Cardiothoracic;  Laterality: Left;    HYSTERECTOMY  1982    INJECTION OF ANESTHETIC AGENT AROUND MULTIPLE INTERCOSTAL NERVES N/A 7/20/2022    Procedure: BLOCK, NERVE, INTERCOSTAL, 2 OR MORE;  Surgeon: Sanju Locke MD;   Location: [facility] OR;  Service: Cardiothoracic;  Laterality: N/A;  PARASTERNAL NERVE BLOCK    INSERTION OF SHUNT      LEFT HEART CATHETERIZATION Left 12/3/2018    Procedure: CATHETERIZATION, HEART, LEFT;  Surgeon: Michelle Holman MD;  Location: [facility] CATH LAB;  Service: Cardiology;  Laterality: Left;  LAD ATHERECTOMY STENT/ FEMORAL APPROACH    LEFT HEART CATHETERIZATION Left 7/13/2022    Procedure: CATHETERIZATION, HEART, LEFT;  Surgeon: Abhi Sloan MD;  Location: [facility] CATH LAB;  Service: Cardiology;  Laterality: Left;    LEFT HEART CATHETERIZATION Left 2/13/2024    Procedure: Left heart cath;  Surgeon: Michelle Holman MD;  Location: [facility] CATH LAB;  Service: Cardiology;  Laterality: Left;    OOPHORECTOMY      RIGHT HEART CATHETERIZATION Right 2/13/2024    Procedure: INSERTION, CATHETER, RIGHT HEART;  Surgeon: Michelle Holman MD;  Location: [facility] CATH LAB;  Service: Cardiology;  Laterality: Right;    TRANSESOPHAGEAL ECHOCARDIOGRAPHY N/A 3/7/2024    Procedure: ECHOCARDIOGRAM, TRANSESOPHAGEAL;  Surgeon: Colton Abreu MD;  Location: [facility] CATH LAB;  Service: Cardiology;  Laterality: N/A;    wrist cyst Right 1980s    dorsal wrist cyst       Review of patient's allergies indicates:   Allergen Reactions    Norvasc [amlodipine] Shortness Of Breath    Codeine Swelling     Other Reaction(s): Itchy skin eruption (code-91755164,SNOMED-CT)    Propoxyphene Swelling     Other Reaction(s): Itchy skin eruption (code-82086616,SNOMED-CT)    Atorvastatin      Muscle twitching     Current Facility-Administered Medications   Medication Frequency    acetaminophen oral solution 650 mg Q6H PRN    amiodarone 360 mg/200 mL (1.8 mg/mL) infusion Continuous    cefTRIAXone (Rocephin) 1 g in dextrose 5 % in water (D5W) 100 mL IVPB (MB+) Q24H    chlorhexidine 0.12 % solution 15 mL BID    dextrose 10% bolus 125 mL 125 mL PRN    dextrose 10% bolus 250 mL 250 mL PRN    fentaNYL 2500 mcg in 0.9% sodium chloride 250 mL infusion premix (titrating)  Continuous    glucagon (human recombinant) injection 1 mg PRN    heparin (porcine) injection 5,000 Units Q8H    insulin aspart U-100 pen 0-10 Units Q6H PRN    levETIRAcetam in NaCl (iso-os) IVPB 1,000 mg Q12H    metoprolol tartrate (LOPRESSOR) tablet 25 mg BID    mupirocin 2 % ointment BID    NORepinephrine 4 mg in dextrose 5% 250 mL infusion (premix) Continuous    pantoprazole injection 40 mg Daily    propofol (DIPRIVAN) 10 mg/mL infusion Continuous    sodium chloride 0.9% flush 10 mL PRN     Family History       Problem Relation (Age of Onset)    Cancer Maternal Grandmother    Diabetes Mother    Heart disease Mother    Hyperlipidemia Mother    Hypertension Mother    Muscular dystrophy Son          Tobacco Use    Smoking status: Former     Current packs/day: 0.00     Average packs/day: 1.5 packs/day for 30.0 years (45.0 ttl pk-yrs)     Types: Cigarettes     Start date: 1960     Quit date: 1990     Years since quittin.1    Smokeless tobacco: Former   Substance and Sexual Activity    Alcohol use: No     Alcohol/week: 0.0 standard drinks of alcohol    Drug use: No    Sexual activity: Not Currently     Review of Systems   Reason unable to perform ROS: Patient is intubated and sedated.     Objective:     Vital Signs (Most Recent):  Temp: 97.5 °F (36.4 °C) (24 0938)  Pulse: 66 (24 0938)  Resp: 18 (24 0938)  BP: 133/67 (24 0805)  SpO2: 98 % (24 0938) Vital Signs (24h Range):  Temp:  [97.3 °F (36.3 °C)-100.2 °F (37.9 °C)] 97.5 °F (36.4 °C)  Pulse:  [] 66  Resp:  [10-30] 18  SpO2:  [96 %-100 %] 98 %  BP: (118-186)/(58-84) 133/67  Arterial Line BP: (109-180)/(37-53) 138/45     Weight: 64.4 kg (142 lb) (24 1202)  Body mass index is 23.63 kg/m².  Body surface area is 1.72 meters squared.    I/O last 3 completed shifts:  In:  [I.V.:1254.3; IV Piggyback:799.7]  Out: 25 [Urine:25]    Physical Exam  Constitutional:       General: She is not in acute distress.      Appearance: She is ill-appearing.   HENT:      Head: Normocephalic and atraumatic.   Eyes:      General: No scleral icterus.     Extraocular Movements: Extraocular movements intact.      Pupils: Pupils are equal, round, and reactive to light.   Cardiovascular:      Rate and Rhythm: Regular rhythm. Tachycardia present.   Pulmonary:      Effort: Pulmonary effort is normal.      Breath sounds: No stridor.   Musculoskeletal:      Right lower leg: No edema.      Left lower leg: No edema.   Skin:     General: Skin is warm and dry.   Neurological:      Comments: Can not be assessed.   Psychiatric:      Comments: Can not be assessed.         Significant Labs:  BMP:   Recent Labs   Lab 03/08/24  0420   *      K 3.6   CL 98   CO2 24   BUN 31*   CREATININE 4.8*   CALCIUM 8.9   MG 1.7     CMP:   Recent Labs   Lab 03/07/24  1427 03/07/24  1645 03/08/24  0420   *   < > 224*   CALCIUM 10.1   < > 8.9   ALBUMIN 2.8*  --   --    PROT 5.9*  --   --       < > 137   K 3.6   < > 3.6   CO2 19*   < > 24      < > 98   BUN 18   < > 31*   CREATININE 4.3*   < > 4.8*   ALKPHOS 111  --   --    ALT 75*  --   --    AST 82*  --   --    BILITOT 0.8  --   --     < > = values in this interval not displayed.     All labs within the past 24 hours have been reviewed.    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 End-stage renal disease:  Will plan to hold dialysis today due to critical illness and clinical setting.    There is no urgent indication for dialysis from a metabolic standpoint.    2. Electrolytes:  Potassium is stable at 3.6.    3. Acid-base: Serum bicarbonate is stable at 20.      Thank you for your consult.     Adrian Ramirez MD  Nephrology  [facility] - [facility] ([facility])

## 2024-03-08 NOTE — CARE UPDATE
Advance Care Planning   [facility] - Int[facility] ([facility])  Palliative Care RN      Patient Name: Avril Monzon  MRN: [id number]  Admission Date: 3/7/2024  Hospital Length of Stay: 1 days  Code Status: Full Code   Attending Provider: Zoila Valadez MD  Palliative Care Provider: JACK Johansen  Primary Care Physician: Rose Germain MD  Principal Problem:Cardiac arrest    Contacted daughter/HCPJOSE Johnson to introduce supportive role of palliative during serious illness. Elizabeth was will return to bedside at 1400 visitation and is awaiting updates on EEG and neuro. Prepared her for difficult, necessary conversations and that we will follow-up on Monday. Elizabeth shared she felt her mom followed commands by eye movement on verbal demand. Updated YAS Gregory and Dr. Valadez.       Ted Figueroa RN-PN, Palliative Medicine   [phone]

## 2024-03-08 NOTE — PROCEDURES
Date of service  03/08/2024    Introduction  Electroencephalographic (EEG) recording is performed with electrodes placed according to the International 10-20 placement system. Thirty two (32) channels of digital signal (sampling rate of 512/sec) including T1 and T2 was simultaneously recorded from the scalp and may include EKG, EMG, and/or eye monitors. Recording band pass was 0.1 to 512 Hz. Digital video recording of the patient is simultaneously recorded with the EEG. The patient is instructed to report clinical symptoms which may occur during the recording session. EEG and video recording is stored and archived in digital format. Activation procedures which include photic stimulation, hyperventilation and instructing patients to perform simple tasks are done in selected patients.    The EEG is displayed on a monitor screen and can be reviewed using different montages. Computer assisted analysis is employed to detect spike and electrographic seizure activity. The entire record is submitted for computer analysis. The entire recording is visually reviewed and, the times identified by computer analysis as being spikes or seizures are reviewed again.     Compressed spectral analysis (CSA) is also performed on the activity recorded from each individual channel. This is displayed as a power display of frequencies from 0 to 30 Hz over time. The CSA is reviewed looking for asymmetries in power between homologous areas of the scalp and then compared with the original EEG recording.       Findings  This short-term video EEG recording in a comatose patient shows burst suppression pattern with long bursts lasting 6-20 seconds between suppression lasting 10-15 seconds. The bursts are comprised of high amplitude sharply contoured delta and frontal sharp waves.      During the bursts, the patient was noted to have myoclonic jerks of the legs, arms, and trunk.     Reactivity was not tested. Activation procedures were not  performed.     The EKG channel showed regular rhythm.    Interpretation  This short-term video EEG shows burst suppression pattern with associated myoclonus during the bursts. This is consistent with anoxic myoclonus or myoclonic status epilepticus, which can be seen as a result of anoxic/hypoxic brain injury. Burst suppression pattern can also be a result of sedative effect which may confound the EEG findings.

## 2024-03-08 NOTE — ASSESSMENT & PLAN NOTE
Advance Care Planning     D/w family yesterday extensively.   I will get palliative care to help further with discussions once we have input from tele neuro colleagues.

## 2024-03-08 NOTE — ASSESSMENT & PLAN NOTE
-PEA arrest post EUNICE  -Remains critically ill, on Levophed  -Vfib once in ICU-required DCCV---remains in SR on amiodarone infusion  -Continue supportive care  -Awaiting neurology input

## 2024-03-08 NOTE — HOSPITAL COURSE
3/8/24-Patient seen and examined today. Currently in SR with HR in 60's. Remains critically ill. Myoclonic jerking noted. Patient unresponsive, gaze deviated upward. Neurology consulted, EEG pending.

## 2024-03-08 NOTE — PROGRESS NOTES
Count includes the [facility])  Critical Care Medicine  Progress Note    Patient Name: Avril Monzon  MRN: [id number]  Admission Date: 3/7/2024  Hospital Length of Stay: 1 days  Code Status: Full Code  Attending Provider: Zoila Valadez MD  Primary Care Provider: Rose Germain MD   Principal Problem: Cardiac arrest    Subjective:     HPI:  79yr old with CMP ef 45%, Severe pulmonary HTN PAP > 85, Valvular heart disease, DM, ESRD, CVA, Retinopathy was getting outpatient EUNICE.  She was given diprivan for the procedure and post procedure remained unresponsive, then during EKD went from tachycardia to asystole.   She was given one roud of Epi, several boluses of vasopressors by anesthesia and was resuscitated for a few minutes. Intubated and bough to ICU.   In ICU CVL was placed and she was placed on levo. She had two more VT / VF episodes requiring CPR, Amiodarone bolus was given in the previous arrest and amiodarone infusion was initiated.   An A line was placed and CT head and other orders were placed. Family was updated by Cardiology. She was also given Ca and bicarbonate times 2 during resuscitation.     Hospital/ICU Course:  3/8. ABG reviewed. Settings changed. Myoclonic activity. Suspect a longer arrest duration in recovery. Tele Neuro consult, Cont EEG and other test pending.      Objective:     Vital Signs (Most Recent):  Temp: 97.5 °F (36.4 °C) (03/08/24 0824)  Pulse: 64 (03/08/24 0824)  Resp: 18 (03/08/24 0824)  BP: 133/67 (03/08/24 0805)  SpO2: 98 % (03/08/24 0824) Vital Signs (24h Range):  Temp:  [97.3 °F (36.3 °C)-100.2 °F (37.9 °C)] 97.5 °F (36.4 °C)  Pulse:  [] 64  Resp:  [10-30] 18  SpO2:  [96 %-100 %] 98 %  BP: (118-186)/(58-84) 133/67  Arterial Line BP: (109-180)/(37-53) 129/40     Weight: 64.4 kg (142 lb)  Body mass index is 23.63 kg/m².      Intake/Output Summary (Last 24 hours) at 3/8/2024 0851  Last data filed at 3/8/2024 0700  Gross per 24 hour   Intake 2054 ml   Output 25 ml    Net 2029 ml        Physical Exam  Vitals and nursing note reviewed.   Constitutional:       General: She is in acute distress.      Appearance: She is ill-appearing. She is not toxic-appearing or diaphoretic.   HENT:      Head: Normocephalic.   Cardiovascular:      Rate and Rhythm: Tachycardia present.   Pulmonary:      Breath sounds: Normal breath sounds.   Abdominal:      Palpations: Abdomen is soft.           Review of Systems    Vents:  Vent Mode: A/C (03/08/24 0737)  Set Rate: 18 BPM (03/08/24 0737)  Vt Set: 400 mL (03/08/24 0737)  PEEP/CPAP: 5 cmH20 (03/08/24 0737)  Oxygen Concentration (%): 30 (03/08/24 0824)  Peak Airway Pressure: 27 cmH20 (03/08/24 0737)  Plateau Pressure: 27 cmH20 (03/08/24 0737)  Total Ve: 7.18 L/m (03/08/24 0737)  Negative Inspiratory Force (cm H2O): 0 (03/08/24 0737)  F/VT Ratio<105 (RSBI): (!) 50.25 (03/08/24 0737)    Lines/Drains/Airways       Central Venous Catheter Line  Duration             Percutaneous Central Line - Triple Lumen  03/07/24 1347 Internal Jugular Right <1 day              Drain  Duration                  NG/OG Tube 03/07/24 1847 Center mouth <1 day         Urethral Catheter 03/07/24 1847 <1 day              Airway  Duration                  Airway - Non-Surgical 03/07/24 1259 <1 day         Airway - Non-Surgical 03/07/24 1300 Endotracheal Tube <1 day              Peripheral Intravenous Line  Duration                  Hemodialysis AV Fistula 04/01/20 2203 Left upper arm 1436 days         Peripheral IV - Single Lumen 03/07/24 1210 20 G Posterior;Right Hand <1 day                    Significant Labs:    CBC/Anemia Profile:  Recent Labs   Lab 03/07/24  1402 03/07/24  1427 03/08/24  0420   WBC  --  8.38 10.43   HGB  --  9.9* 9.0*   HCT 30* 31.5* 26.7*   PLT  --  214 209   MCV  --  101* 94   RDW  --  14.2 13.8        Chemistries:  Recent Labs   Lab 03/07/24  1427 03/07/24  1645 03/08/24  0420    139 137   K 3.6 3.5 3.6    101 98   CO2 19* 20* 24   BUN 18 23  "31*   CREATININE 4.3* 4.4* 4.8*   CALCIUM 10.1 10.1 8.9   ALBUMIN 2.8*  --   --    PROT 5.9*  --   --    BILITOT 0.8  --   --    ALKPHOS 111  --   --    ALT 75*  --   --    AST 82*  --   --    MG  --   --  1.7       All pertinent labs within the past 24 hours have been reviewed.    Significant Imaging:  I have reviewed all pertinent imaging results/findings within the past 24 hours.    ABG  Recent Labs   Lab 03/08/24  0318   PH 7.670*   PO2 198*   PCO2 21.4*   HCO3 24.7   BE 4*     Assessment/Plan:     Neuro  Anoxic encephalopathy  Unfortunately with myoclinic activity its likely she has anoxic injury  Repeat CT +/_MRI on Sunday or Monday.  Cont EEG and Neuro consult.   Suspect prolog arrest from induction of anesthesia / procedure to resuscitation.       Cardiac/Vascular  * Cardiac arrest  Cardiology following  Levo and amiodarone infusion  Careful hydration  CVL placed.   Primary rhythm at time of arrest Asystole  Keep temp 34-36    3/8   Continue supportive care        Acute on chronic combined systolic and diastolic heart failure  See orders.      Pulmonary hypertension  Severe PAP 88mmHg    Renal/  ESRD (end stage renal disease)  Creatine stable for now. BMP reviewed- noted Estimated Creatinine Clearance: 9.9 mL/min (A) (based on SCr of 4.3 mg/dL (H)). according to latest data. Based on current GFR, CKD stage is end stage.  Monitor UOP and serial BMP and adjust therapy as needed. Renally dose meds. Avoid nephrotoxic medications and procedures.  Nephrology consult.    Endocrine  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, with long-term current use of insulin  Patient's FSGs are controlled on current medication regimen.  Last A1c reviewed-   Lab Results   Component Value Date    HGBA1C 6.9 (H) 12/05/2023     Most recent fingerstick glucose reviewed- No results for input(s): "POCTGLUCOSE" in the last 24 hours.  Current correctional scale  High  Maintain anti-hyperglycemic dose " as follows-   Antihyperglycemics (From admission, onward)      None          Hold Oral hypoglycemics while patient is in the hospital.    SSI and frequent accucheck    Palliative Care  Goals of care, counseling/discussion  Advance Care Planning    D/w family yesterday extensively.   I will get palliative care to help further with discussions once we have input from tele neuro colleagues.         Critical Care Daily Checklist:    A: Awake: RASS Goal/Actual Goal: RASS Goal: -4-->deep sedation  Actual:     B: Spontaneous Breathing Trial Performed?     C: SAT & SBT Coordinated?  Yes when able                      D: Delirium: CAM-ICU Overall CAM-ICU: Positive   E: Early Mobility Performed? no   F: Feeding Goal:    Status:     Current Diet Order   Procedures    Diet NPO      AS: Analgesia/Sedation yes   T: Thromboembolic Prophylaxis uyes   H: HOB > 300 yes   U: Stress Ulcer Prophylaxis (if needed) yes   G: Glucose Control yes   B: Bowel Function     I: Indwelling Catheter (Lines & Pfeiffer) Necessity yes   D: De-escalation of Antimicrobials/Pharmacotherapies yes    Plan for the day/ETD yes    Code Status:  Family/Goals of Care: Full Code  yes     Critical Care Time: 36 minutes  Critical secondary to Patient has a condition that poses threat to life and bodily function: Severe Respiratory Distress      Critical care was time spent personally by me on the following activities: development of treatment plan with patient or surrogate and bedside caregivers, discussions with consultants, evaluation of patient's response to treatment, examination of patient, ordering and performing treatments and interventions, ordering and review of laboratory studies, ordering and review of radiographic studies, pulse oximetry, re-evaluation of patient's condition. This critical care time did not overlap with that of any other provider or involve time for any procedures.     Zoila Valadez MD  Critical Care Medicine  [facility] ([facility])

## 2024-03-08 NOTE — PLAN OF CARE
[facility])  Initial Discharge Assessment       Primary Care Provider: Rose Germain MD    Admission Diagnosis: Pulmonary hypertension [I27.20]  SOB (shortness of breath) [R06.02]  Non-rheumatic mitral regurgitation [I34.0]    Admission Date: 3/7/2024  Expected Discharge Date:     Transition of Care Barriers: None    Payor: HUMANA MANAGED MEDICARE / Plan: HUMANA SNP HMO PPO SPECIAL NEEDS / Product Type: Medicare Advantage /     Extended Emergency Contact Information  Primary Emergency Contact: Elizabeth Monzon  Address: [address] [place] [address], [place] [address] [facility]  Home Phone: [phone]  Mobile Phone: [phone]  Relation: Healthcare Power of   Secondary Emergency Contact: Myriam Don   [place]  Mobile Phone: [phone]  Relation: Sister    Discharge Plan A: Other (TBD)         [facility] [address] [facility] [place] [place] [address]  [address]  [facility] [address]  Phone: [phone] Fax: [phone]    [facility] [address] [place], [place] - [address] [facility]  [address] [facility] [address]  Phone: [phone] Fax: [phone]    [facility] [address]  [place], [place] - [address]  [address]  [facility] [address]  Phone: [phone] Fax: [phone]      Initial Assessment (most recent)       Adult Discharge Assessment - 03/08/24 1348          Discharge Assessment    Assessment Type Discharge Planning Assessment     Confirmed/corrected address, phone number and insurance Yes     Source of Information family     Communicated MARIBELL with patient/caregiver Date not available/Unable to determine     Reason For Admission Cardiac Arrest     People in Home child(robert), adult     Facility Arrived From: home     Do you expect to return to your current living situation? Yes     Do you have help at home or someone to help you manage your care at  home? Yes     Who are your caregiver(s) and their phone number(s)? Daughter, Elizabeth and Spouse, Clint     Prior to hospitilization cognitive status: Alert/Oriented     Current cognitive status: Coma/Sedated/Intubated     Walking or Climbing Stairs Difficulty no     Dressing/Bathing Difficulty no     Home Accessibility wheelchair accessible     Home Layout Able to live on 1st floor     Equipment Currently Used at Home wheelchair     Readmission within 30 days? No     Patient currently being followed by outpatient case management? No     Do you currently have service(s) that help you manage your care at home? No     Do you take prescription medications? Yes     Do you have prescription coverage? Yes     Coverage MCR     Do you have any problems affording any of your prescribed medications? No     Is the patient taking medications as prescribed? yes     Who is going to help you get home at discharge? Family     How do you get to doctors appointments? family or friend will provide     Are you on dialysis? Yes     Dialysis Name and Scheduled days [facility]     Do you take coumadin? No     Discharge Plan A Other   TBD    DME Needed Upon Discharge  none     Discharge Plan discussed with: Adult children     Transition of Care Barriers None                   Anticipated DC dispo: TBD   Prior Level of Function: independent with ADLs   People in home: spouse, Brayan, Daughter, Elizabeth, and grandchildren    Comments:  CM met with patient's daughter at bedside to introduce role and discuss discharge planning. Family will be help at home and can provide transport at time of discharge. Confirmed demographics, insurance, and emergency contacts. CM discharge needs depends on hospital progress. CM will continue following to assist with other needs.

## 2024-03-08 NOTE — ASSESSMENT & PLAN NOTE
Unfortunately with myoclinic activity its likely she has anoxic injury  Repeat CT +/_MRI on Sunday or Monday.  Cont EEG and Neuro consult.   Suspect prolog arrest from induction of anesthesia / procedure to resuscitation.

## 2024-03-08 NOTE — PLAN OF CARE
Myoclonic jerking continues despite sedation. NSR on monitor. BP supported with levo. Afebrile. EEG & tele/neuro consult done today. Tmax 99. Prn tylenol given and ice packs applied.  Pt turned/repositioned with use of pillows. Heel boots in place. Bed low, wheels locked, alarms audible. POC reviewed.

## 2024-03-08 NOTE — CHAPLAIN
Initial visit with patient and family.  Visited with patient and family to assess for spiritual and emotional needs.  Patient was currently able to visit but I did spend time talking with her family.  Patient's family are praying and hoping for a miracle.  They wanted me to pray for them and I took time to do this before leaving.  Spiritual care remains available as needed.    Chaplain Jack Lopez M.Div., [facility]

## 2024-03-08 NOTE — SUBJECTIVE & OBJECTIVE
Review of Systems   Unable to perform ROS: Intubated     Objective:     Vital Signs (Most Recent):  Temp: 97.7 °F (36.5 °C) (03/08/24 1152)  Pulse: 66 (03/08/24 1152)  Resp: 18 (03/08/24 1152)  BP: (!) 123/41 (03/08/24 1131)  SpO2: 98 % (03/08/24 1152) Vital Signs (24h Range):  Temp:  [97.3 °F (36.3 °C)-100.2 °F (37.9 °C)] 97.7 °F (36.5 °C)  Pulse:  [] 66  Resp:  [10-30] 18  SpO2:  [98 %-100 %] 98 %  BP: (118-186)/(41-82) 123/41  Arterial Line BP: (109-180)/(37-53) 120/40     Weight: 64.4 kg (142 lb)  Body mass index is 23.63 kg/m².     SpO2: 98 %         Intake/Output Summary (Last 24 hours) at 3/8/2024 1220  Last data filed at 3/8/2024 1100  Gross per 24 hour   Intake 2374.64 ml   Output 25 ml   Net 2349.64 ml       Lines/Drains/Airways       Central Venous Catheter Line  Duration             Percutaneous Central Line - Triple Lumen  03/07/24 1347 Internal Jugular Right <1 day              Drain  Duration                  NG/OG Tube 03/07/24 1847 Center mouth <1 day         Urethral Catheter 03/07/24 1847 <1 day              Airway  Duration                  Airway - Non-Surgical 03/07/24 1259 <1 day         Airway - Non-Surgical 03/07/24 1300 Endotracheal Tube <1 day              Peripheral Intravenous Line  Duration                  Hemodialysis AV Fistula 04/01/20 2203 Left upper arm 1436 days         Peripheral IV - Single Lumen 03/07/24 1210 20 G Posterior;Right Hand 1 day                       Physical Exam  Vitals and nursing note reviewed.   Constitutional:       Appearance: She is ill-appearing.      Comments: Intubated   HENT:      Head: Normocephalic and atraumatic.   Cardiovascular:      Rate and Rhythm: Normal rate and regular rhythm.      Heart sounds: S1 normal and S2 normal. Murmur heard.      High-pitched blowing holosystolic murmur is present at the apex.   Pulmonary:      Effort: Pulmonary effort is normal.   Musculoskeletal:      Right lower leg: No edema.      Left lower leg: No edema.  "  Neurological:      Comments: Unresponsive, gaze deviated upward            Significant Labs: CMP   Recent Labs   Lab 03/07/24  1427 03/07/24  1645 03/08/24  0420    139 137   K 3.6 3.5 3.6    101 98   CO2 19* 20* 24   * 250* 224*   BUN 18 23 31*   CREATININE 4.3* 4.4* 4.8*   CALCIUM 10.1 10.1 8.9   PROT 5.9*  --   --    ALBUMIN 2.8*  --   --    BILITOT 0.8  --   --    ALKPHOS 111  --   --    AST 82*  --   --    ALT 75*  --   --    ANIONGAP 21* 18* 15   , CBC   Recent Labs   Lab 03/07/24  1427 03/08/24  0420   WBC 8.38 10.43   HGB 9.9* 9.0*   HCT 31.5* 26.7*    209   , Troponin No results for input(s): "TROPONINI" in the last 48 hours., and All pertinent lab results from the last 24 hours have been reviewed.    Significant Imaging: Echocardiogram: Transthoracic echo (TTE) complete (Cupid Only):   Results for orders placed or performed during the hospital encounter of 01/30/24   Echo   Result Value Ref Range    BSA 1.63 m2    LVIDd 5.20 3.5 - 6.0 cm    LVIDs 3.84 2.1 - 4.0 cm    IVS 1.10 0.6 - 1.1 cm    LVOT diameter 1.89 cm    LVOT area 2.8 cm2    FS 26 28 - 44 %    Left Ventricle Relative Wall Thickness 0.38 cm    Posterior Wall 0.99 0.6 - 1.1 cm    LV mass 205.95 g    LV Mass Index 126 g/m2    MV Peak E Lio 1.31 m/s    TDI LATERAL 0.05 m/s    TDI SEPTAL 0.04 m/s    E/E' ratio 29.11 m/s    MV Peak A Lio 0.85 m/s    TR Max Lio 4.47 m/s    E/A ratio 1.54     IVRT 79.92 msec    LV SEPTAL E/E' RATIO 32.75 m/s    LA Volume Index 30.8 mL/m2    LV LATERAL E/E' RATIO 26.20 m/s    LA volume 50.53 cm3    RVDD 3.49 cm    RVOT peak VTI 14.4 cm    LA size 3.61 cm    Left Atrium Minor Axis 4.18 cm    Left Atrium Major Axis 5.05 cm    LA volume (mod) 45.15 cm3    LA WIDTH 3.6 cm    LA Volume Index (Mod) 27.5 mL/m2    Triscuspid Valve Regurgitation Peak Gradient 80 mmHg    Sinus 2.21 cm    STJ 2.08 cm    Ascending aorta 2.33 cm    Mean e' 0.05 m/s    ZLVIDS 2.33     ZLVIDD 1.19     Mcneil's Biplane MOD " Ejection Fraction 51 %    LVOT stroke volume 85.53 cm3    LV Systolic Volume 63.36 mL    LV Systolic Volume Index 38.6 mL/m2    LV Diastolic Volume 129.59 mL    LV Diastolic Volume Index 79.02 mL/m2    E wave deceleration time 163.98 msec    LVOT peak jigar 1.38 m/s    Left Ventricular Outflow Tract Mean Velocity 0.90 cm/s    Left Ventricular Outflow Tract Mean Gradient 3.74 mmHg    TAPSE 1.29 cm    RA Major Axis 3.88 cm    RA Width 3.38 cm    AV mean gradient 8 mmHg    AV peak gradient 15 mmHg    Ao peak jigar 1.94 m/s    Ao VTI 44.10 cm    LVOT peak VTI 30.50 cm    AV valve area 1.94 cm²    AV Velocity Ratio 0.71     AV index (prosthetic) 0.69     KARIME by Velocity Ratio 1.99 cm²    PV mean gradient 1 mmHg    RVOT peak jigar 0.62 m/s    Ao root annulus 2.73 cm    IVC diameter 1.54 cm    TV resting pulmonary artery pressure 88 mmHg    RV TB RVSP 12 mmHg    Est. RA pres 8 mmHg    Narrative      Left Ventricle: The left ventricle is normal in size. Normal wall   thickness. Normal wall motion. There is normal systolic function with a   visually estimated ejection fraction of 55 - 60%. Grade II diastolic   dysfunction.    Right Ventricle: Normal right ventricular cavity size. Wall thickness   is normal. Right ventricle wall motion  is normal. Systolic function is   normal.    Mitral Valve: There is moderate regurgitation.    Tricuspid Valve: There is moderate regurgitation.    Pulmonary Artery: The estimated pulmonary artery systolic pressure is   88 mmHg.    IVC/SVC: Intermediate venous pressure at 8 mmHg.      and EKG: Reviewed

## 2024-03-08 NOTE — ASSESSMENT & PLAN NOTE
Cardiology following  Levo and amiodarone infusion  Careful hydration  CVL placed.   Primary rhythm at time of arrest Asystole  Keep temp 34-36    3/8   Continue supportive care

## 2024-03-08 NOTE — PROGRESS NOTES
[facility] ([facility])  Cardiology  Progress Note    Patient Name: Avril Monzon  MRN: [id number]  Admission Date: 3/7/2024  Hospital Length of Stay: 1 days  Code Status: Full Code   Attending Physician: Zoila Valadez MD   Primary Care Physician: Rose Germain MD  Expected Discharge Date:   Principal Problem:Cardiac arrest    Subjective:   HPI:  9yr old with CMP ef 45%, Severe pulmonary HTN PAP > 85, Valvular heart disease, DM, ESRD, CVA, Retinopathy was getting outpatient EUNICE.  She was given diprivan for the procedure and post procedure remained unresponsive, then during EKD went from tachycardia to asystole.   She was given one roud of Epi, several boluses of vasopressors by anesthesia and was resuscitated for a few minutes. Intubated and bough to ICU.   In ICU CVL was placed and she was placed on levo. She had two more VT / VF episodes requiring CPR, Amiodarone bolus was given in the previous arrest and amiodarone infusion was initiated.   An A line was placed and CT head and other orders were placed. Family was updated by Cardiology. She was also given Ca and bicarbonate times 2 during resuscitation.     Hospital Course:   3/8/24-Patient seen and examined today. Currently in SR with HR in 60's. Remains critically ill. Myoclonic jerking noted. Patient unresponsive, gaze deviated upward. Neurology consulted, EEG pending.      Review of Systems   Unable to perform ROS: Intubated     Objective:     Vital Signs (Most Recent):  Temp: 97.7 °F (36.5 °C) (03/08/24 1152)  Pulse: 66 (03/08/24 1152)  Resp: 18 (03/08/24 1152)  BP: (!) 123/41 (03/08/24 1131)  SpO2: 98 % (03/08/24 1152) Vital Signs (24h Range):  Temp:  [97.3 °F (36.3 °C)-100.2 °F (37.9 °C)] 97.7 °F (36.5 °C)  Pulse:  [] 66  Resp:  [10-30] 18  SpO2:  [98 %-100 %] 98 %  BP: (118-186)/(41-82) 123/41  Arterial Line BP: (109-180)/(37-53) 120/40     Weight: 64.4 kg (142 lb)  Body mass index is 23.63 kg/m².     SpO2: 98 %          Intake/Output Summary (Last 24 hours) at 3/8/2024 1220  Last data filed at 3/8/2024 1100  Gross per 24 hour   Intake 2374.64 ml   Output 25 ml   Net 2349.64 ml       Lines/Drains/Airways       Central Venous Catheter Line  Duration             Percutaneous Central Line - Triple Lumen  03/07/24 1347 Internal Jugular Right <1 day              Drain  Duration                  NG/OG Tube 03/07/24 1847 Center mouth <1 day         Urethral Catheter 03/07/24 1847 <1 day              Airway  Duration                  Airway - Non-Surgical 03/07/24 1259 <1 day         Airway - Non-Surgical 03/07/24 1300 Endotracheal Tube <1 day              Peripheral Intravenous Line  Duration                  Hemodialysis AV Fistula 04/01/20 2203 Left upper arm 1436 days         Peripheral IV - Single Lumen 03/07/24 1210 20 G Posterior;Right Hand 1 day                       Physical Exam  Vitals and nursing note reviewed.   Constitutional:       Appearance: She is ill-appearing.      Comments: Intubated   HENT:      Head: Normocephalic and atraumatic.   Cardiovascular:      Rate and Rhythm: Normal rate and regular rhythm.      Heart sounds: S1 normal and S2 normal. Murmur heard.      High-pitched blowing holosystolic murmur is present at the apex.   Pulmonary:      Effort: Pulmonary effort is normal.   Musculoskeletal:      Right lower leg: No edema.      Left lower leg: No edema.   Neurological:      Comments: Unresponsive, gaze deviated upward            Significant Labs: CMP   Recent Labs   Lab 03/07/24  1427 03/07/24  1645 03/08/24  0420    139 137   K 3.6 3.5 3.6    101 98   CO2 19* 20* 24   * 250* 224*   BUN 18 23 31*   CREATININE 4.3* 4.4* 4.8*   CALCIUM 10.1 10.1 8.9   PROT 5.9*  --   --    ALBUMIN 2.8*  --   --    BILITOT 0.8  --   --    ALKPHOS 111  --   --    AST 82*  --   --    ALT 75*  --   --    ANIONGAP 21* 18* 15   , CBC   Recent Labs   Lab 03/07/24  1427 03/08/24  0420   WBC 8.38 10.43   HGB 9.9*  "9.0*   HCT 31.5* 26.7*    209   , Troponin No results for input(s): "TROPONINI" in the last 48 hours., and All pertinent lab results from the last 24 hours have been reviewed.    Significant Imaging: Echocardiogram: Transthoracic echo (TTE) complete (Cupid Only):   Results for orders placed or performed during the hospital encounter of 01/30/24   Echo   Result Value Ref Range    BSA 1.63 m2    LVIDd 5.20 3.5 - 6.0 cm    LVIDs 3.84 2.1 - 4.0 cm    IVS 1.10 0.6 - 1.1 cm    LVOT diameter 1.89 cm    LVOT area 2.8 cm2    FS 26 28 - 44 %    Left Ventricle Relative Wall Thickness 0.38 cm    Posterior Wall 0.99 0.6 - 1.1 cm    LV mass 205.95 g    LV Mass Index 126 g/m2    MV Peak E Lio 1.31 m/s    TDI LATERAL 0.05 m/s    TDI SEPTAL 0.04 m/s    E/E' ratio 29.11 m/s    MV Peak A Lio 0.85 m/s    TR Max Lio 4.47 m/s    E/A ratio 1.54     IVRT 79.92 msec    LV SEPTAL E/E' RATIO 32.75 m/s    LA Volume Index 30.8 mL/m2    LV LATERAL E/E' RATIO 26.20 m/s    LA volume 50.53 cm3    RVDD 3.49 cm    RVOT peak VTI 14.4 cm    LA size 3.61 cm    Left Atrium Minor Axis 4.18 cm    Left Atrium Major Axis 5.05 cm    LA volume (mod) 45.15 cm3    LA WIDTH 3.6 cm    LA Volume Index (Mod) 27.5 mL/m2    Triscuspid Valve Regurgitation Peak Gradient 80 mmHg    Sinus 2.21 cm    STJ 2.08 cm    Ascending aorta 2.33 cm    Mean e' 0.05 m/s    ZLVIDS 2.33     ZLVIDD 1.19     Mcneil's Biplane MOD Ejection Fraction 51 %    LVOT stroke volume 85.53 cm3    LV Systolic Volume 63.36 mL    LV Systolic Volume Index 38.6 mL/m2    LV Diastolic Volume 129.59 mL    LV Diastolic Volume Index 79.02 mL/m2    E wave deceleration time 163.98 msec    LVOT peak lio 1.38 m/s    Left Ventricular Outflow Tract Mean Velocity 0.90 cm/s    Left Ventricular Outflow Tract Mean Gradient 3.74 mmHg    TAPSE 1.29 cm    RA Major Axis 3.88 cm    RA Width 3.38 cm    AV mean gradient 8 mmHg    AV peak gradient 15 mmHg    Ao peak lio 1.94 m/s    Ao VTI 44.10 cm    LVOT peak VTI " 30.50 cm    AV valve area 1.94 cm²    AV Velocity Ratio 0.71     AV index (prosthetic) 0.69     KARIME by Velocity Ratio 1.99 cm²    PV mean gradient 1 mmHg    RVOT peak jigar 0.62 m/s    Ao root annulus 2.73 cm    IVC diameter 1.54 cm    TV resting pulmonary artery pressure 88 mmHg    RV TB RVSP 12 mmHg    Est. RA pres 8 mmHg    Narrative      Left Ventricle: The left ventricle is normal in size. Normal wall   thickness. Normal wall motion. There is normal systolic function with a   visually estimated ejection fraction of 55 - 60%. Grade II diastolic   dysfunction.    Right Ventricle: Normal right ventricular cavity size. Wall thickness   is normal. Right ventricle wall motion  is normal. Systolic function is   normal.    Mitral Valve: There is moderate regurgitation.    Tricuspid Valve: There is moderate regurgitation.    Pulmonary Artery: The estimated pulmonary artery systolic pressure is   88 mmHg.    IVC/SVC: Intermediate venous pressure at 8 mmHg.      and EKG: Reviewed  Assessment and Plan:   Patient remains critically ill. Continue supportive care. Awaiting neurology input.    * Cardiac arrest  -PEA arrest post EUNICE  -Remains critically ill, on Levophed  -Vfib once in ICU-required DCCV---remains in SR on amiodarone infusion  -Continue supportive care  -Awaiting neurology input    Anoxic encephalopathy  -Myoclonic jerking and deviated gaze, unresponsive  -Neurology consulted, EEG pending    Acute on chronic combined systolic and diastolic heart failure  -Fluid removal as per HD when indicated  -Continue OMT as tolerated    ESRD (end stage renal disease)  -Mgmt as per nephrology        VTE Risk Mitigation (From admission, onward)           Ordered     heparin (porcine) injection 5,000 Units  Every 8 hours         03/08/24 0904     IP VTE HIGH RISK PATIENT  Once         03/07/24 1448     Place sequential compression device  Until discontinued         03/07/24 1448                    Moriah Tran,  KIAN  Cardiology  [facility] ([facility])

## 2024-03-08 NOTE — SUBJECTIVE & OBJECTIVE
Objective:     Vital Signs (Most Recent):  Temp: 97.5 °F (36.4 °C) (03/08/24 0824)  Pulse: 64 (03/08/24 0824)  Resp: 18 (03/08/24 0824)  BP: 133/67 (03/08/24 0805)  SpO2: 98 % (03/08/24 0824) Vital Signs (24h Range):  Temp:  [97.3 °F (36.3 °C)-100.2 °F (37.9 °C)] 97.5 °F (36.4 °C)  Pulse:  [] 64  Resp:  [10-30] 18  SpO2:  [96 %-100 %] 98 %  BP: (118-186)/(58-84) 133/67  Arterial Line BP: (109-180)/(37-53) 129/40     Weight: 64.4 kg (142 lb)  Body mass index is 23.63 kg/m².      Intake/Output Summary (Last 24 hours) at 3/8/2024 0851  Last data filed at 3/8/2024 0700  Gross per 24 hour   Intake 2054 ml   Output 25 ml   Net 2029 ml        Physical Exam  Vitals and nursing note reviewed.   Constitutional:       General: She is in acute distress.      Appearance: She is ill-appearing. She is not toxic-appearing or diaphoretic.   HENT:      Head: Normocephalic.   Cardiovascular:      Rate and Rhythm: Tachycardia present.   Pulmonary:      Breath sounds: Normal breath sounds.   Abdominal:      Palpations: Abdomen is soft.           Review of Systems    Vents:  Vent Mode: A/C (03/08/24 0737)  Set Rate: 18 BPM (03/08/24 0737)  Vt Set: 400 mL (03/08/24 0737)  PEEP/CPAP: 5 cmH20 (03/08/24 0737)  Oxygen Concentration (%): 30 (03/08/24 0824)  Peak Airway Pressure: 27 cmH20 (03/08/24 0737)  Plateau Pressure: 27 cmH20 (03/08/24 0737)  Total Ve: 7.18 L/m (03/08/24 0737)  Negative Inspiratory Force (cm H2O): 0 (03/08/24 0737)  F/VT Ratio<105 (RSBI): (!) 50.25 (03/08/24 0737)    Lines/Drains/Airways       Central Venous Catheter Line  Duration             Percutaneous Central Line - Triple Lumen  03/07/24 1347 Internal Jugular Right <1 day              Drain  Duration                  NG/OG Tube 03/07/24 1847 Center mouth <1 day         Urethral Catheter 03/07/24 1847 <1 day              Airway  Duration                  Airway - Non-Surgical 03/07/24 1259 <1 day         Airway - Non-Surgical 03/07/24 1300 Endotracheal Tube  <1 day              Peripheral Intravenous Line  Duration                  Hemodialysis AV Fistula 04/01/20 2203 Left upper arm 1436 days         Peripheral IV - Single Lumen 03/07/24 1210 20 G Posterior;Right Hand <1 day                    Significant Labs:    CBC/Anemia Profile:  Recent Labs   Lab 03/07/24  1402 03/07/24  1427 03/08/24  0420   WBC  --  8.38 10.43   HGB  --  9.9* 9.0*   HCT 30* 31.5* 26.7*   PLT  --  214 209   MCV  --  101* 94   RDW  --  14.2 13.8        Chemistries:  Recent Labs   Lab 03/07/24  1427 03/07/24  1645 03/08/24  0420    139 137   K 3.6 3.5 3.6    101 98   CO2 19* 20* 24   BUN 18 23 31*   CREATININE 4.3* 4.4* 4.8*   CALCIUM 10.1 10.1 8.9   ALBUMIN 2.8*  --   --    PROT 5.9*  --   --    BILITOT 0.8  --   --    ALKPHOS 111  --   --    ALT 75*  --   --    AST 82*  --   --    MG  --   --  1.7       All pertinent labs within the past 24 hours have been reviewed.    Significant Imaging:  I have reviewed all pertinent imaging results/findings within the past 24 hours.

## 2024-03-08 NOTE — PLAN OF CARE
Despite sedation optimization, patient has still intermittent episode of myoclonic jerks.  Titrated Levophed drip accordingly to maintain MAP 65mmhg.  Tolerated current vent settings.  Temperature controlled <99F.

## 2024-03-08 NOTE — HOSPITAL COURSE
3/8. ABG reviewed. Settings changed. Myoclonic activity. Suspect a longer arrest duration in recovery. Tele Neuro consult, Cont EEG and other test pending.  3/9: remains neurologically devastated on the vent, family request meeting at 1600 today, continue supportive care   3/10: no meaningful recovery from a neuro standpoint, remains off sedation on the vent, supportive care   3/11: neuro exam unchanged.  Vent and hemodynamics are stable.  3/12: Has cough today.  No withdrawal to pain.  Vent and hemodynamics stable.  3/13: weak cough, faint posturing bilaterally. Vent and hemodynamics stable.  3/14: Neuro exam largely unchanged.  Vent and hemodynamics stable.   3/15: No major changes.  Vent and hemodynamics stable.  Family meeting this afternoon.  3/16: Palliative extubation around noon today.  Family at bedside.  Full comfort measures in place.    Discharge to inpatient hospice.

## 2024-03-09 LAB
ALLENS TEST: ABNORMAL
ANION GAP SERPL CALC-SCNC: 16 MMOL/L (ref 8–16)
BASOPHILS # BLD AUTO: 0.04 K/UL (ref 0–0.2)
BASOPHILS NFR BLD: 0.3 % (ref 0–1.9)
BUN SERPL-MCNC: 39 MG/DL (ref 8–23)
CALCIUM SERPL-MCNC: 8.6 MG/DL (ref 8.7–10.5)
CHLORIDE SERPL-SCNC: 96 MMOL/L (ref 95–110)
CO2 SERPL-SCNC: 23 MMOL/L (ref 23–29)
CREAT SERPL-MCNC: 5.4 MG/DL (ref 0.5–1.4)
DELSYS: ABNORMAL
DIFFERENTIAL METHOD BLD: ABNORMAL
EOSINOPHIL # BLD AUTO: 0.2 K/UL (ref 0–0.5)
EOSINOPHIL NFR BLD: 1.7 % (ref 0–8)
ERYTHROCYTE [DISTWIDTH] IN BLOOD BY AUTOMATED COUNT: 14.4 % (ref 11.5–14.5)
ERYTHROCYTE [SEDIMENTATION RATE] IN BLOOD BY WESTERGREN METHOD: 18 MM/H
EST. GFR  (NO RACE VARIABLE): 8 ML/MIN/1.73 M^2
FIO2: 30
GLUCOSE SERPL-MCNC: 172 MG/DL (ref 70–110)
HCO3 UR-SCNC: 24.4 MMOL/L (ref 24–28)
HCT VFR BLD AUTO: 28 % (ref 37–48.5)
HGB BLD-MCNC: 9.6 G/DL (ref 12–16)
IMM GRANULOCYTES # BLD AUTO: 0.08 K/UL (ref 0–0.04)
IMM GRANULOCYTES NFR BLD AUTO: 0.7 % (ref 0–0.5)
LYMPHOCYTES # BLD AUTO: 0.9 K/UL (ref 1–4.8)
LYMPHOCYTES NFR BLD: 7.6 % (ref 18–48)
MAGNESIUM SERPL-MCNC: 1.7 MG/DL (ref 1.6–2.6)
MCH RBC QN AUTO: 32.3 PG (ref 27–31)
MCHC RBC AUTO-ENTMCNC: 34.3 G/DL (ref 32–36)
MCV RBC AUTO: 94 FL (ref 82–98)
MODE: ABNORMAL
MONOCYTES # BLD AUTO: 0.5 K/UL (ref 0.3–1)
MONOCYTES NFR BLD: 4.2 % (ref 4–15)
NEUTROPHILS # BLD AUTO: 10.3 K/UL (ref 1.8–7.7)
NEUTROPHILS NFR BLD: 85.5 % (ref 38–73)
NRBC BLD-RTO: 0 /100 WBC
PCO2 BLDA: 27 MMHG (ref 35–45)
PEEP: 5
PH SMN: 7.56 [PH] (ref 7.35–7.45)
PLATELET # BLD AUTO: 204 K/UL (ref 150–450)
PMV BLD AUTO: 9.2 FL (ref 9.2–12.9)
PO2 BLDA: 144 MMHG (ref 80–100)
POC BE: 2 MMOL/L
POC SATURATED O2: 100 % (ref 95–100)
POCT GLUCOSE: 147 MG/DL (ref 70–110)
POCT GLUCOSE: 170 MG/DL (ref 70–110)
POCT GLUCOSE: 183 MG/DL (ref 70–110)
POCT GLUCOSE: 192 MG/DL (ref 70–110)
POTASSIUM SERPL-SCNC: 3.5 MMOL/L (ref 3.5–5.1)
RBC # BLD AUTO: 2.97 M/UL (ref 4–5.4)
SAMPLE: ABNORMAL
SITE: ABNORMAL
SODIUM SERPL-SCNC: 135 MMOL/L (ref 136–145)
VT: 400
WBC # BLD AUTO: 12.05 K/UL (ref 3.9–12.7)

## 2024-03-09 PROCEDURE — 25000003 PHARM REV CODE 250: Mod: HCNC | Performed by: SPECIALIST

## 2024-03-09 PROCEDURE — 85025 COMPLETE CBC W/AUTO DIFF WBC: CPT | Mod: HCNC | Performed by: NURSE PRACTITIONER

## 2024-03-09 PROCEDURE — 27100171 HC OXYGEN HIGH FLOW UP TO 24 HOURS: Mod: HCNC

## 2024-03-09 PROCEDURE — 63600175 PHARM REV CODE 636 W HCPCS: Mod: HCNC | Performed by: NURSE PRACTITIONER

## 2024-03-09 PROCEDURE — 83735 ASSAY OF MAGNESIUM: CPT | Mod: HCNC | Performed by: NURSE PRACTITIONER

## 2024-03-09 PROCEDURE — 99900035 HC TECH TIME PER 15 MIN (STAT): Mod: HCNC

## 2024-03-09 PROCEDURE — 63600175 PHARM REV CODE 636 W HCPCS: Mod: HCNC | Performed by: SPECIALIST

## 2024-03-09 PROCEDURE — 25000003 PHARM REV CODE 250: Mod: HCNC | Performed by: NURSE PRACTITIONER

## 2024-03-09 PROCEDURE — 94003 VENT MGMT INPAT SUBQ DAY: CPT | Mod: HCNC

## 2024-03-09 PROCEDURE — 80048 BASIC METABOLIC PNL TOTAL CA: CPT | Mod: HCNC | Performed by: NURSE PRACTITIONER

## 2024-03-09 PROCEDURE — 20000000 HC ICU ROOM: Mod: HCNC

## 2024-03-09 PROCEDURE — 99232 SBSQ HOSP IP/OBS MODERATE 35: CPT | Mod: HCNC,,, | Performed by: INTERNAL MEDICINE

## 2024-03-09 PROCEDURE — 94761 N-INVAS EAR/PLS OXIMETRY MLT: CPT | Mod: HCNC,XB

## 2024-03-09 PROCEDURE — 37799 UNLISTED PX VASCULAR SURGERY: CPT | Mod: HCNC

## 2024-03-09 PROCEDURE — 63600175 PHARM REV CODE 636 W HCPCS: Mod: HCNC

## 2024-03-09 PROCEDURE — 82803 BLOOD GASES ANY COMBINATION: CPT | Mod: HCNC

## 2024-03-09 PROCEDURE — 99900026 HC AIRWAY MAINTENANCE (STAT): Mod: HCNC

## 2024-03-09 PROCEDURE — C9113 INJ PANTOPRAZOLE SODIUM, VIA: HCPCS | Mod: HCNC | Performed by: NURSE PRACTITIONER

## 2024-03-09 RX ADMIN — INSULIN ASPART 2 UNITS: 100 INJECTION, SOLUTION INTRAVENOUS; SUBCUTANEOUS at 05:03

## 2024-03-09 RX ADMIN — MUPIROCIN: 20 OINTMENT TOPICAL at 08:03

## 2024-03-09 RX ADMIN — LEVETIRACETAM INJECTION 1000 MG: 10 INJECTION INTRAVENOUS at 08:03

## 2024-03-09 RX ADMIN — CEFTRIAXONE 1 G: 1 INJECTION, POWDER, FOR SOLUTION INTRAMUSCULAR; INTRAVENOUS at 05:03

## 2024-03-09 RX ADMIN — HEPARIN SODIUM 5000 UNITS: 5000 INJECTION INTRAVENOUS; SUBCUTANEOUS at 03:03

## 2024-03-09 RX ADMIN — LEVETIRACETAM INJECTION 1000 MG: 10 INJECTION INTRAVENOUS at 09:03

## 2024-03-09 RX ADMIN — CHLORHEXIDINE GLUCONATE 0.12% ORAL RINSE 15 ML: 1.2 LIQUID ORAL at 09:03

## 2024-03-09 RX ADMIN — PANTOPRAZOLE SODIUM 40 MG: 40 INJECTION, POWDER, FOR SOLUTION INTRAVENOUS at 08:03

## 2024-03-09 RX ADMIN — AMIODARONE HYDROCHLORIDE 0.5 MG/MIN: 1.8 INJECTION, SOLUTION INTRAVENOUS at 04:03

## 2024-03-09 RX ADMIN — HEPARIN SODIUM 5000 UNITS: 5000 INJECTION INTRAVENOUS; SUBCUTANEOUS at 10:03

## 2024-03-09 RX ADMIN — ACETAMINOPHEN 650 MG: 650 SOLUTION ORAL at 11:03

## 2024-03-09 RX ADMIN — CHLORHEXIDINE GLUCONATE 0.12% ORAL RINSE 15 ML: 1.2 LIQUID ORAL at 08:03

## 2024-03-09 RX ADMIN — MUPIROCIN: 20 OINTMENT TOPICAL at 09:03

## 2024-03-09 RX ADMIN — INSULIN ASPART 1 UNITS: 100 INJECTION, SOLUTION INTRAVENOUS; SUBCUTANEOUS at 12:03

## 2024-03-09 RX ADMIN — PROPOFOL 30 MCG/KG/MIN: 10 INJECTION, EMULSION INTRAVENOUS at 04:03

## 2024-03-09 RX ADMIN — ACETAMINOPHEN 650 MG: 650 SOLUTION ORAL at 09:03

## 2024-03-09 RX ADMIN — NOREPINEPHRINE BITARTRATE 0.04 MCG/KG/MIN: 4 INJECTION, SOLUTION INTRAVENOUS at 12:03

## 2024-03-09 RX ADMIN — HEPARIN SODIUM 5000 UNITS: 5000 INJECTION INTRAVENOUS; SUBCUTANEOUS at 06:03

## 2024-03-09 NOTE — CONSULTS
TELE-NEUROLOGY CONSULT       Patient Location: A 01/A 01     START TIME: 5:45  END TIME: 6:30        Name: Avril Monzon  MRN: [id number]   [facility]: [id number]      Date: 03/08/2024    Chief Complaint: Cardiac arrest, myoclonus    History of Present Illness (HPI): Patient is a 79 yr old female with history as below including stroke, NSTEMI, ESRD who is consulted to tele-neurology due to concern for anoxic brain injury. Patient was undergoing a EUNICE and sustained a cardiac arrest where CPR was initiated. Initial CT brain is negative. She coded multiple times afterwards. Patient started having myoclonic jerking. She was started on keppra. EEG showed burst suppression with possible status myoclonus. Concern for anoxic rain injury. I spoke to family, daughter, granddaughter of the patient in detail and explained that the myoclonic jerking is a poor prognosticating sign.     ROS: unable to obtain    Past Medical History: The patient  has a past medical history of Abnormal nuclear stress test (02/12/2024), Acute hypoxemic respiratory failure (11/26/2018), Anemia, Arthritis, Back pain, Cataract, CKD stage G4/A3, GFR 15 - 29 and albumin creatinine ratio >300 mg/g, Coronary artery disease involving native coronary artery of native heart (11/30/2018), Diabetic retinopathy, DM (diabetes mellitus) type II controlled, neurological manifestation, Exudative age-related macular degeneration of left eye with active choroidal neovascularization (02/05/2019), GERD (gastroesophageal reflux disease), Glaucoma, Heart failure, Hyperlipidemia, Hypertension, Non-ST elevation MI (NSTEMI) (11/28/2018), NSTEMI (non-ST elevated myocardial infarction) (11/27/2018), Peripheral neuropathy, Polyneuropathy, Proteinuria, Seizures, Skin ulcer of toe of right foot with fat layer exposed (10/14/2022), Stroke (2012,2014), Tobacco dependence, and Type 2 diabetes with peripheral circulatory disorder, controlled.    Social History: The patient  reports that she  "quit smoking about 34 years ago. Her smoking use included cigarettes. She started smoking about 64 years ago. She has a 45.0 pack-year smoking history. She has quit using smokeless tobacco. She reports that she does not drink alcohol and does not use drugs.    Family History: Their family history includes Cancer in her maternal grandmother; Diabetes in her mother; Heart disease in her mother; Hyperlipidemia in her mother; Hypertension in her mother; Muscular dystrophy in her son.    Allergies: Norvasc [amlodipine], Codeine, Propoxyphene, and Atorvastatin     Meds: Scheduled Meds:   cefTRIAXone (Rocephin) IV (PEDS and ADULTS)  1 g Intravenous Q24H    chlorhexidine  15 mL Mouth/Throat BID    heparin (porcine)  5,000 Units Subcutaneous Q8H    levETIRAcetam (Keppra) IV (PEDS and ADULTS)  1,000 mg Intravenous Q12H    metoprolol tartrate  25 mg Per OG tube BID    mupirocin   Nasal BID    pantoprazole  40 mg Intravenous Daily     Continuous Infusions:   amiodarone in dextrose 5% 0.5 mg/min (03/08/24 1800)    fentanyl 25 mcg/hr (03/08/24 1800)    NORepinephrine bitartrate-D5W 0.06 mcg/kg/min (03/08/24 1800)    propofoL 40 mcg/kg/min (03/08/24 1800)     PRN Meds:.acetaminophen, dextrose 10%, dextrose 10%, glucagon (human recombinant), insulin aspart U-100, sodium chloride 0.9%    Exam:  BP (!) 153/67   Pulse 68   Temp 99 °F (37.2 °C)   Resp 19   Ht 5' 5" (1.651 m)   Wt 64.4 kg (142 lb)   LMP  (LMP Unknown)   SpO2 100%   Breastfeeding No   BMI 23.63 kg/m²       Neurological exam- Intubated and sedated. Per RN, patient has a cough and gag. No corneal or pupillary reflex    Laboratory/Radiological:   Admission on 03/07/2024   Component Date Value Ref Range Status    BSA 03/07/2024 1.72  m2 Final    Sodium 03/07/2024 138  136 - 145 mmol/L Final    Potassium 03/07/2024 3.9  3.5 - 5.1 mmol/L Final    Chloride 03/07/2024 100  95 - 110 mmol/L Final    CO2 03/07/2024 25  23 - 29 mmol/L Final    Glucose 03/07/2024 129 (H)  70 " - 110 mg/dL Final    BUN 03/07/2024 18  8 - 23 mg/dL Final    Creatinine 03/07/2024 4.1 (H)  0.5 - 1.4 mg/dL Final    Calcium 03/07/2024 9.7  8.7 - 10.5 mg/dL Final    Anion Gap 03/07/2024 13  8 - 16 mmol/L Final    eGFR 03/07/2024 11 (A)  >60 mL/min/1.73 m^2 Final    WBC 03/07/2024 8.38  3.90 - 12.70 K/uL Final    RBC 03/07/2024 3.12 (L)  4.00 - 5.40 M/uL Final    Hemoglobin 03/07/2024 9.9 (L)  12.0 - 16.0 g/dL Final    Hematocrit 03/07/2024 31.5 (L)  37.0 - 48.5 % Final    MCV 03/07/2024 101 (H)  82 - 98 fL Final    MCH 03/07/2024 31.7 (H)  27.0 - 31.0 pg Final    MCHC 03/07/2024 31.4 (L)  32.0 - 36.0 g/dL Final    RDW 03/07/2024 14.2  11.5 - 14.5 % Final    Platelets 03/07/2024 214  150 - 450 K/uL Final    MPV 03/07/2024 9.2  9.2 - 12.9 fL Final    Immature Granulocytes 03/07/2024 2.4 (H)  0.0 - 0.5 % Final    Gran # (ANC) 03/07/2024 5.9  1.8 - 7.7 K/uL Final    Immature Grans (Abs) 03/07/2024 0.20 (H)  0.00 - 0.04 K/uL Final    Lymph # 03/07/2024 2.0  1.0 - 4.8 K/uL Final    Mono # 03/07/2024 0.3  0.3 - 1.0 K/uL Final    Eos # 03/07/2024 0.1  0.0 - 0.5 K/uL Final    Baso # 03/07/2024 0.02  0.00 - 0.20 K/uL Final    nRBC 03/07/2024 1 (A)  0 /100 WBC Final    Gran % 03/07/2024 69.9  38.0 - 73.0 % Final    Lymph % 03/07/2024 23.3  18.0 - 48.0 % Final    Mono % 03/07/2024 3.6 (L)  4.0 - 15.0 % Final    Eosinophil % 03/07/2024 0.6  0.0 - 8.0 % Final    Basophil % 03/07/2024 0.2  0.0 - 1.9 % Final    Differential Method 03/07/2024 Automated   Final    Lactate (Lactic Acid) 03/07/2024 10.4 (HH)  0.5 - 2.2 mmol/L Final    Sodium 03/07/2024 142  136 - 145 mmol/L Final    Potassium 03/07/2024 3.6  3.5 - 5.1 mmol/L Final    Chloride 03/07/2024 102  95 - 110 mmol/L Final    CO2 03/07/2024 19 (L)  23 - 29 mmol/L Final    Glucose 03/07/2024 200 (H)  70 - 110 mg/dL Final    BUN 03/07/2024 18  8 - 23 mg/dL Final    Creatinine 03/07/2024 4.3 (H)  0.5 - 1.4 mg/dL Final    Calcium 03/07/2024 10.1  8.7 - 10.5 mg/dL Final    Total  Protein 03/07/2024 5.9 (L)  6.0 - 8.4 g/dL Final    Albumin 03/07/2024 2.8 (L)  3.5 - 5.2 g/dL Final    Total Bilirubin 03/07/2024 0.8  0.1 - 1.0 mg/dL Final    Alkaline Phosphatase 03/07/2024 111  55 - 135 U/L Final    AST 03/07/2024 82 (H)  10 - 40 U/L Final    ALT 03/07/2024 75 (H)  10 - 44 U/L Final    eGFR 03/07/2024 10 (A)  >60 mL/min/1.73 m^2 Final    Anion Gap 03/07/2024 21 (H)  8 - 16 mmol/L Final    POC PH 03/07/2024 7.250 (LL)  7.35 - 7.45 Final    POC PCO2 03/07/2024 49.3 (H)  35 - 45 mmHg Final    POC PO2 03/07/2024 333 (H)  80 - 100 mmHg Final    POC HCO3 03/07/2024 21.6 (L)  24 - 28 mmol/L Final    POC BE 03/07/2024 -6 (L)  -2 to 2 mmol/L Final    POC SATURATED O2 03/07/2024 100  95 - 100 % Final    POC Glucose 03/07/2024 226 (H)  70 - 110 mg/dL Final    POC Sodium 03/07/2024 139  136 - 145 mmol/L Final    POC Potassium 03/07/2024 3.9  3.5 - 5.1 mmol/L Final    POC Ionized Calcium 03/07/2024 1.44 (H)  1.06 - 1.42 mmol/L Final    POC Hematocrit 03/07/2024 30 (L)  36 - 54 %PCV Final    Rate 03/07/2024 22   Final    Sample 03/07/2024 ARTERIAL   Final    Site 03/07/2024 Sofia/UAC   Final    Allens Test 03/07/2024 N/A   Final    DelSys 03/07/2024 Adult Vent   Final    Mode 03/07/2024 AC/PRVC   Final    Vt 03/07/2024 395   Final    PEEP 03/07/2024 5   Final    FiO2 03/07/2024 100   Final    Lactate (Lactic Acid) 03/07/2024 4.4 (HH)  0.5 - 2.2 mmol/L Final    Lactate (Lactic Acid) 03/07/2024 2.5 (H)  0.5 - 2.2 mmol/L Final    Sodium 03/07/2024 139  136 - 145 mmol/L Final    Potassium 03/07/2024 3.5  3.5 - 5.1 mmol/L Final    Chloride 03/07/2024 101  95 - 110 mmol/L Final    CO2 03/07/2024 20 (L)  23 - 29 mmol/L Final    Glucose 03/07/2024 250 (H)  70 - 110 mg/dL Final    BUN 03/07/2024 23  8 - 23 mg/dL Final    Creatinine 03/07/2024 4.4 (H)  0.5 - 1.4 mg/dL Final    Calcium 03/07/2024 10.1  8.7 - 10.5 mg/dL Final    Anion Gap 03/07/2024 18 (H)  8 - 16 mmol/L Final    eGFR 03/07/2024 10 (A)  >60 mL/min/1.73  m^2 Final    Lactate (Lactic Acid) 03/08/2024 1.8  0.5 - 2.2 mmol/L Final    Sodium 03/08/2024 137  136 - 145 mmol/L Final    Potassium 03/08/2024 3.6  3.5 - 5.1 mmol/L Final    Chloride 03/08/2024 98  95 - 110 mmol/L Final    CO2 03/08/2024 24  23 - 29 mmol/L Final    Glucose 03/08/2024 224 (H)  70 - 110 mg/dL Final    BUN 03/08/2024 31 (H)  8 - 23 mg/dL Final    Creatinine 03/08/2024 4.8 (H)  0.5 - 1.4 mg/dL Final    Calcium 03/08/2024 8.9  8.7 - 10.5 mg/dL Final    Anion Gap 03/08/2024 15  8 - 16 mmol/L Final    eGFR 03/08/2024 9 (A)  >60 mL/min/1.73 m^2 Final    WBC 03/08/2024 10.43  3.90 - 12.70 K/uL Final    RBC 03/08/2024 2.83 (L)  4.00 - 5.40 M/uL Final    Hemoglobin 03/08/2024 9.0 (L)  12.0 - 16.0 g/dL Final    Hematocrit 03/08/2024 26.7 (L)  37.0 - 48.5 % Final    MCV 03/08/2024 94  82 - 98 fL Final    MCH 03/08/2024 31.8 (H)  27.0 - 31.0 pg Final    MCHC 03/08/2024 33.7  32.0 - 36.0 g/dL Final    RDW 03/08/2024 13.8  11.5 - 14.5 % Final    Platelets 03/08/2024 209  150 - 450 K/uL Final    MPV 03/08/2024 9.7  9.2 - 12.9 fL Final    Immature Granulocytes 03/08/2024 0.5  0.0 - 0.5 % Final    Gran # (ANC) 03/08/2024 9.0 (H)  1.8 - 7.7 K/uL Final    Immature Grans (Abs) 03/08/2024 0.05 (H)  0.00 - 0.04 K/uL Final    Lymph # 03/08/2024 0.7 (L)  1.0 - 4.8 K/uL Final    Mono # 03/08/2024 0.7  0.3 - 1.0 K/uL Final    Eos # 03/08/2024 0.0  0.0 - 0.5 K/uL Final    Baso # 03/08/2024 0.02  0.00 - 0.20 K/uL Final    nRBC 03/08/2024 0  0 /100 WBC Final    Gran % 03/08/2024 86.5 (H)  38.0 - 73.0 % Final    Lymph % 03/08/2024 6.2 (L)  18.0 - 48.0 % Final    Mono % 03/08/2024 6.6  4.0 - 15.0 % Final    Eosinophil % 03/08/2024 0.0  0.0 - 8.0 % Final    Basophil % 03/08/2024 0.2  0.0 - 1.9 % Final    Differential Method 03/08/2024 Automated   Final    Magnesium 03/08/2024 1.7  1.6 - 2.6 mg/dL Final    POC PH 03/08/2024 7.670 (HH)  7.35 - 7.45 Final    POC PCO2 03/08/2024 21.4 (LL)  35 - 45 mmHg Final    POC PO2 03/08/2024  198 (H)  80 - 100 mmHg Final    POC HCO3 03/08/2024 24.7  24 - 28 mmol/L Final    POC BE 03/08/2024 4 (H)  -2 to 2 mmol/L Final    POC SATURATED O2 03/08/2024 100  95 - 100 % Final    Rate 03/08/2024 24   Final    Sample 03/08/2024 ARTERIAL   Final    Site 03/08/2024 Sofia/UAC   Final    Allens Test 03/08/2024 N/A   Final    DelSys 03/08/2024 Adult Vent   Final    Mode 03/08/2024 AC/PRVC   Final    Vt 03/08/2024 400   Final    PEEP 03/08/2024 5   Final    FiO2 03/08/2024 40   Final    Hemoglobin A1C 03/08/2024 6.1 (H)  4.0 - 5.6 % Final    Estimated Avg Glucose 03/08/2024 128  68 - 131 mg/dL Final    POCT Glucose 03/08/2024 199 (H)  70 - 110 mg/dL Final    POCT Glucose 03/08/2024 180 (H)  70 - 110 mg/dL Final   Orders Only on 03/07/2024   Component Date Value Ref Range Status    QRS Duration 03/07/2024 128  ms In process    [facility] QTC Calculation 03/07/2024 440  ms In process   Lab Visit on 03/05/2024   Component Date Value Ref Range Status    BNP 03/05/2024 2,745 (H)  0 - 99 pg/mL Final   Admission on 03/02/2024, Discharged on 03/02/2024   Component Date Value Ref Range Status    POCT Glucose 03/02/2024 42 (LL)  70 - 110 mg/dL Final    POCT Glucose 03/02/2024 49 (LL)  70 - 110 mg/dL Final    POCT Glucose 03/02/2024 247 (H)  70 - 110 mg/dL Final    POCT Glucose 03/02/2024 194 (H)  70 - 110 mg/dL Final    QRS Duration 03/02/2024 102  ms Final    [facility] QTC Calculation 03/02/2024 493  ms Final    POCT Glucose 03/02/2024 171 (H)  70 - 110 mg/dL Final    Influenza A, Molecular 03/02/2024 Negative  Negative Final    Influenza B, Molecular 03/02/2024 Negative  Negative Final    Flu A & B Source 03/02/2024 Nasal swab   Final    SARS-CoV-2 RNA, Amplification, Qual 03/02/2024 Negative  Negative Final    WBC 03/02/2024 7.35  3.90 - 12.70 K/uL Final    RBC 03/02/2024 3.17 (L)  4.00 - 5.40 M/uL Final    Hemoglobin 03/02/2024 10.0 (L)  12.0 - 16.0 g/dL Final    Hematocrit 03/02/2024 31.6 (L)  37.0 - 48.5 % Final    MCV 03/02/2024 100  (H)  82 - 98 fL Final    MCH 03/02/2024 31.5 (H)  27.0 - 31.0 pg Final    MCHC 03/02/2024 31.6 (L)  32.0 - 36.0 g/dL Final    RDW 03/02/2024 14.2  11.5 - 14.5 % Final    Platelets 03/02/2024 172  150 - 450 K/uL Final    MPV 03/02/2024 9.0 (L)  9.2 - 12.9 fL Final    Immature Granulocytes 03/02/2024 0.3  0.0 - 0.5 % Final    Gran # (ANC) 03/02/2024 6.4  1.8 - 7.7 K/uL Final    Immature Grans (Abs) 03/02/2024 0.02  0.00 - 0.04 K/uL Final    Lymph # 03/02/2024 0.4 (L)  1.0 - 4.8 K/uL Final    Mono # 03/02/2024 0.5  0.3 - 1.0 K/uL Final    Eos # 03/02/2024 0.0  0.0 - 0.5 K/uL Final    Baso # 03/02/2024 0.02  0.00 - 0.20 K/uL Final    nRBC 03/02/2024 0  0 /100 WBC Final    Gran % 03/02/2024 86.9 (H)  38.0 - 73.0 % Final    Lymph % 03/02/2024 5.9 (L)  18.0 - 48.0 % Final    Mono % 03/02/2024 6.5  4.0 - 15.0 % Final    Eosinophil % 03/02/2024 0.1  0.0 - 8.0 % Final    Basophil % 03/02/2024 0.3  0.0 - 1.9 % Final    Differential Method 03/02/2024 Automated   Final    Sodium 03/02/2024 138  136 - 145 mmol/L Final    Potassium 03/02/2024 3.7  3.5 - 5.1 mmol/L Final    Chloride 03/02/2024 100  95 - 110 mmol/L Final    CO2 03/02/2024 26  23 - 29 mmol/L Final    Glucose 03/02/2024 107  70 - 110 mg/dL Final    BUN 03/02/2024 12  8 - 23 mg/dL Final    Creatinine 03/02/2024 3.7 (H)  0.5 - 1.4 mg/dL Final    Calcium 03/02/2024 9.4  8.7 - 10.5 mg/dL Final    Total Protein 03/02/2024 6.5  6.0 - 8.4 g/dL Final    Albumin 03/02/2024 3.1 (L)  3.5 - 5.2 g/dL Final    Total Bilirubin 03/02/2024 0.6  0.1 - 1.0 mg/dL Final    Alkaline Phosphatase 03/02/2024 88  55 - 135 U/L Final    AST 03/02/2024 16  10 - 40 U/L Final    ALT 03/02/2024 10  10 - 44 U/L Final    eGFR 03/02/2024 12 (A)  >60 mL/min/1.73 m^2 Final    Anion Gap 03/02/2024 12  8 - 16 mmol/L Final    Magnesium 03/02/2024 1.9  1.6 - 2.6 mg/dL Final    POCT Glucose 03/02/2024 142 (H)  70 - 110 mg/dL Final   Admission on 02/13/2024, Discharged on 02/13/2024   Component Date Value Ref  Range Status    Cath EF Quantitative 02/13/2024 50  % Final    Sodium 02/13/2024 137  136 - 145 mmol/L Final    Potassium 02/13/2024 3.6  3.5 - 5.1 mmol/L Final    Chloride 02/13/2024 98  95 - 110 mmol/L Final    CO2 02/13/2024 25  23 - 29 mmol/L Final    Glucose 02/13/2024 110  70 - 110 mg/dL Final    BUN 02/13/2024 22  8 - 23 mg/dL Final    Creatinine 02/13/2024 4.7 (H)  0.5 - 1.4 mg/dL Final    Calcium 02/13/2024 9.5  8.7 - 10.5 mg/dL Final    Anion Gap 02/13/2024 14  8 - 16 mmol/L Final    eGFR 02/13/2024 9 (A)  >60 mL/min/1.73 m^2 Final    WBC 02/13/2024 5.19  3.90 - 12.70 K/uL Final    RBC 02/13/2024 3.57 (L)  4.00 - 5.40 M/uL Final    Hemoglobin 02/13/2024 11.3 (L)  12.0 - 16.0 g/dL Final    Hematocrit 02/13/2024 34.7 (L)  37.0 - 48.5 % Final    MCV 02/13/2024 97  82 - 98 fL Final    MCH 02/13/2024 31.7 (H)  27.0 - 31.0 pg Final    MCHC 02/13/2024 32.6  32.0 - 36.0 g/dL Final    RDW 02/13/2024 14.0  11.5 - 14.5 % Final    Platelets 02/13/2024 192  150 - 450 K/uL Final    MPV 02/13/2024 8.9 (L)  9.2 - 12.9 fL Final    Immature Granulocytes 02/13/2024 0.4  0.0 - 0.5 % Final    Gran # (ANC) 02/13/2024 3.9  1.8 - 7.7 K/uL Final    Immature Grans (Abs) 02/13/2024 0.02  0.00 - 0.04 K/uL Final    Lymph # 02/13/2024 0.7 (L)  1.0 - 4.8 K/uL Final    Mono # 02/13/2024 0.5  0.3 - 1.0 K/uL Final    Eos # 02/13/2024 0.0  0.0 - 0.5 K/uL Final    Baso # 02/13/2024 0.01  0.00 - 0.20 K/uL Final    nRBC 02/13/2024 0  0 /100 WBC Final    Gran % 02/13/2024 75.6 (H)  38.0 - 73.0 % Final    Lymph % 02/13/2024 13.9 (L)  18.0 - 48.0 % Final    Mono % 02/13/2024 9.1  4.0 - 15.0 % Final    Eosinophil % 02/13/2024 0.8  0.0 - 8.0 % Final    Basophil % 02/13/2024 0.2  0.0 - 1.9 % Final    Differential Method 02/13/2024 Automated   Final   Lab Visit on 02/08/2024   Component Date Value Ref Range Status    aPTT 02/08/2024 29.9  21.0 - 32.0 sec Final    Sodium 02/08/2024 137  136 - 145 mmol/L Final    Potassium 02/08/2024 3.7  3.5 - 5.1  mmol/L Final    Chloride 02/08/2024 99  95 - 110 mmol/L Final    CO2 02/08/2024 26  23 - 29 mmol/L Final    Glucose 02/08/2024 192 (H)  70 - 110 mg/dL Final    BUN 02/08/2024 18  8 - 23 mg/dL Final    Creatinine 02/08/2024 4.4 (H)  0.5 - 1.4 mg/dL Final    Calcium 02/08/2024 9.9  8.7 - 10.5 mg/dL Final    Anion Gap 02/08/2024 12  8 - 16 mmol/L Final    eGFR 02/08/2024 9.8 (A)  >60 mL/min/1.73 m^2 Final    WBC 02/08/2024 6.26  3.90 - 12.70 K/uL Final    RBC 02/08/2024 3.47 (L)  4.00 - 5.40 M/uL Final    Hemoglobin 02/08/2024 10.9 (L)  12.0 - 16.0 g/dL Final    Hematocrit 02/08/2024 35.2 (L)  37.0 - 48.5 % Final    MCV 02/08/2024 101 (H)  82 - 98 fL Final    MCH 02/08/2024 31.4 (H)  27.0 - 31.0 pg Final    MCHC 02/08/2024 31.0 (L)  32.0 - 36.0 g/dL Final    RDW 02/08/2024 13.8  11.5 - 14.5 % Final    Platelets 02/08/2024 245  150 - 450 K/uL Final    MPV 02/08/2024 9.6  9.2 - 12.9 fL Final    Immature Granulocytes 02/08/2024 0.3  0.0 - 0.5 % Final    Gran # (ANC) 02/08/2024 5.1  1.8 - 7.7 K/uL Final    Immature Grans (Abs) 02/08/2024 0.02  0.00 - 0.04 K/uL Final    Lymph # 02/08/2024 0.6 (L)  1.0 - 4.8 K/uL Final    Mono # 02/08/2024 0.4  0.3 - 1.0 K/uL Final    Eos # 02/08/2024 0.1  0.0 - 0.5 K/uL Final    Baso # 02/08/2024 0.01  0.00 - 0.20 K/uL Final    nRBC 02/08/2024 0  0 /100 WBC Final    Gran % 02/08/2024 81.6 (H)  38.0 - 73.0 % Final    Lymph % 02/08/2024 10.2 (L)  18.0 - 48.0 % Final    Mono % 02/08/2024 6.9  4.0 - 15.0 % Final    Eosinophil % 02/08/2024 0.8  0.0 - 8.0 % Final    Basophil % 02/08/2024 0.2  0.0 - 1.9 % Final    Differential Method 02/08/2024 Automated   Final    Prothrombin Time 02/08/2024 10.8  9.0 - 12.5 sec Final    INR 02/08/2024 1.0  0.8 - 1.2 Final   Hospital Outpatient Visit on 01/30/2024   Component Date Value Ref Range Status    BSA 01/30/2024 1.63  m2 Final    LVIDd 01/30/2024 5.20  3.5 - 6.0 cm Final    LVIDs 01/30/2024 3.84  2.1 - 4.0 cm Final    IVS 01/30/2024 1.10  0.6 - 1.1 cm  Final    LVOT diameter 01/30/2024 1.89  cm Final    LVOT area 01/30/2024 2.8  cm2 Final    FS 01/30/2024 26  28 - 44 % Final    Left Ventricle Relative Wall Thick* 01/30/2024 0.38  cm Final    Posterior Wall 01/30/2024 0.99  0.6 - 1.1 cm Final    LV mass 01/30/2024 205.95  g Final    LV Mass Index 01/30/2024 126  g/m2 Final    MV Peak E Lio 01/30/2024 1.31  m/s Final    TDI LATERAL 01/30/2024 0.05  m/s Final    TDI SEPTAL 01/30/2024 0.04  m/s Final    E/E' ratio 01/30/2024 29.11  m/s Final    MV Peak A Lio 01/30/2024 0.85  m/s Final    TR Max Lio 01/30/2024 4.47  m/s Final    E/A ratio 01/30/2024 1.54   Final    IVRT 01/30/2024 79.92  msec Final    LV SEPTAL E/E' RATIO 01/30/2024 32.75  m/s Final    LA Volume Index 01/30/2024 30.8  mL/m2 Final    LV LATERAL E/E' RATIO 01/30/2024 26.20  m/s Final    LA volume 01/30/2024 50.53  cm3 Final    RVDD 01/30/2024 3.49  cm Final    RVOT peak VTI 01/30/2024 14.4  cm Final    LA size 01/30/2024 3.61  cm Final    Left Atrium Minor Axis 01/30/2024 4.18  cm Final    Left Atrium Major Axis 01/30/2024 5.05  cm Final    LA volume (mod) 01/30/2024 45.15  cm3 Final    LA WIDTH 01/30/2024 3.6  cm Final    LA Volume Index (Mod) 01/30/2024 27.5  mL/m2 Final    Triscuspid Valve Regurgitation Pea* 01/30/2024 80  mmHg Final    Sinus 01/30/2024 2.21  cm Final    STJ 01/30/2024 2.08  cm Final    Ascending aorta 01/30/2024 2.33  cm Final    Mean e' 01/30/2024 0.05  m/s Final    ZLVIDS 01/30/2024 2.33   Final    ZLVIDD 01/30/2024 1.19   Final    Mcneil's Biplane MOD Ejection Fra* 01/30/2024 51  % Final    LVOT stroke volume 01/30/2024 85.53  cm3 Final    LV Systolic Volume 01/30/2024 63.36  mL Final    LV Systolic Volume Index 01/30/2024 38.6  mL/m2 Final    LV Diastolic Volume 01/30/2024 129.59  mL Final    LV Diastolic Volume Index 01/30/2024 79.02  mL/m2 Final    E wave deceleration time 01/30/2024 163.98  msec Final    LVOT peak lio 01/30/2024 1.38  m/s Final    Left Ventricular Outflow  Tract Ritu* 01/30/2024 0.90  cm/s Final    Left Ventricular Outflow Tract Ritu* 01/30/2024 3.74  mmHg Final    TAPSE 01/30/2024 1.29  cm Final    RA Major Axis 01/30/2024 3.88  cm Final    RA Width 01/30/2024 3.38  cm Final    AV mean gradient 01/30/2024 8  mmHg Final    AV peak gradient 01/30/2024 15  mmHg Final    Ao peak jigar 01/30/2024 1.94  m/s Final    Ao VTI 01/30/2024 44.10  cm Final    LVOT peak VTI 01/30/2024 30.50  cm Final    AV valve area 01/30/2024 1.94  cm² Final    AV Velocity Ratio 01/30/2024 0.71   Final    AV index (prosthetic) 01/30/2024 0.69   Final    KARIME by Velocity Ratio 01/30/2024 1.99  cm² Final    PV mean gradient 01/30/2024 1  mmHg Final    RVOT peak jigar 01/30/2024 0.62  m/s Final    Ao root annulus 01/30/2024 2.73  cm Final    IVC diameter 01/30/2024 1.54  cm Final    TV resting pulmonary artery pressu* 01/30/2024 88  mmHg Final    RV TB RVSP 01/30/2024 12  mmHg Final    Est. RA pres 01/30/2024 8  mmHg Final   Office Visit on 01/30/2024   Component Date Value Ref Range Status    POC Glucose 01/30/2024 181 (A)  70 - 110 MG/DL Final   Hospital Outpatient Visit on 01/30/2024   Component Date Value Ref Range Status    85% Max Predicted HR 01/30/2024 122   Final    Max Predicted HR 01/30/2024 144   Final    [facility] CV CPX PATIENT IS MALE 01/30/2024 0.0   Final    [facility] CV CPX PATIENT IS FEMALE 01/30/2024 1.0   Final    HR at rest 01/30/2024 68  bpm Final    Systolic blood pressure 01/30/2024 169  mmHg Final    Diastolic blood pressure 01/30/2024 69  mmHg Final    RPP 01/30/2024 11,492   Final    Peak HR 01/30/2024 92  bpm Final    Peak Systolic BP 01/30/2024 206  mmHg Final    Peak Diatolic BP 01/30/2024 75  mmHg Final    Peak RPP 01/30/2024 18,952   Final    % Max HR Achieved 01/30/2024 66   Final    Nuc Rest EF 01/30/2024 40   Final    Nuc Stress EF 01/30/2024 42  % Final       Diagnoses:   1) 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 Vimpat may not be the best choice given prolonged DC interval  5) Palliative care consult given poor prognosis in patient with cardiac arrest and myoclonus  6) Poor prognosis    Please re-consult tele-neurology once MRI is completed and if needed      Thank you for this consult. Please do not hesitate to contact us with questions. Please re-consult neurology if any issues.    This is a consult performed through audio-visual using [employer] armen. Pt and provider are in different locations. History and physical exam are limited due to the nature of this encounter.       Dr. Mandy Chicas  Tele-Neurology- [facility]

## 2024-03-09 NOTE — ASSESSMENT & PLAN NOTE
- s/p cardiac arrest and suspected prolonged down time  - pt with severe myoclonus  - EEG complete  - keppra   - neuro consult  - exam and findings consistent with anoxia   - neuro checks, supportive  - avoid fevers   - plan to repeat CT +/-MRI on late Sunday or Monday  - palliative care consulted

## 2024-03-09 NOTE — PROGRESS NOTES
[facility])  Critical Care Medicine  Progress Note    Patient Name: Avril Monzon  MRN: [id number]  Admission Date: 3/7/2024  Hospital Length of Stay: 2 days  Code Status: Full Code  Attending Provider: Zoila Valadez MD  Primary Care Provider: Rose Germain MD   Principal Problem: Cardiac arrest    Subjective:     HPI:  79yr old with CMP ef 45%, Severe pulmonary HTN PAP > 85, Valvular heart disease, DM, ESRD, CVA, Retinopathy was getting outpatient EUNICE.  She was given diprivan for the procedure and post procedure remained unresponsive, then during EKD went from tachycardia to asystole.   She was given one roud of Epi, several boluses of vasopressors by anesthesia and was resuscitated for a few minutes. Intubated and bough to ICU.   In ICU CVL was placed and she was placed on levo. She had two more VT / VF episodes requiring CPR, Amiodarone bolus was given in the previous arrest and amiodarone infusion was initiated.   An A line was placed and CT head and other orders were placed. Family was updated by Cardiology. She was also given Ca and bicarbonate times 2 during resuscitation.     Hospital/ICU Course:  3/8. ABG reviewed. Settings changed. Myoclonic activity. Suspect a longer arrest duration in recovery. Tele Neuro consult, Cont EEG and other test pending.  3/9: remains neurologically devastated on the vent, family request meeting at 1600 today, continue supportive care     Review of Systems   Unable to perform ROS: Intubated        Objective:     Vital Signs (Most Recent):  Temp: (!) 95.7 °F (35.4 °C) (03/09/24 0725)  Pulse: (!) 54 (03/09/24 0725)  Resp: 12 (03/09/24 0725)  BP: (!) 133/42 (03/09/24 0720)  SpO2: 98 % (03/09/24 0725) Vital Signs (24h Range):  Temp:  [95.4 °F (35.2 °C)-99 °F (37.2 °C)] 95.7 °F (35.4 °C)  Pulse:  [54-71] 54  Resp:  [0-24] 12  SpO2:  [93 %-100 %] 98 %  BP: (123-159)/(41-70) 133/42  Arterial Line BP: (112-149)/(34-49) 124/40     Weight: 57.5 kg (126  lb 12.2 oz)  Body mass index is 21.09 kg/m².      Intake/Output Summary (Last 24 hours) at 3/9/2024 0811  Last data filed at 3/9/2024 0727  Gross per 24 hour   Intake 1355.89 ml   Output 50 ml   Net 1305.89 ml        Physical Exam  Vitals reviewed.   Constitutional:       Appearance: Normal appearance. She is ill-appearing.      Interventions: She is sedated and intubated.   Cardiovascular:      Rate and Rhythm: Normal rate.      Pulses:           Radial pulses are 2+ on the right side and 2+ on the left side.   Pulmonary:      Effort: She is intubated.      Breath sounds: Normal breath sounds.      Comments: Vented, min settings   Abdominal:      General: There is no distension.      Palpations: Abdomen is soft.   Musculoskeletal:      Right lower leg: No edema.      Left lower leg: No edema.   Skin:     General: Skin is cool and dry.   Neurological:      Comments: No spontaneous mvmt or w/d from pain with extremities, grimaces with suctioning, no cough/gag noted this AM            Vents:  Vent Mode: A/C (03/09/24 0704)  Set Rate: 18 BPM (03/09/24 0704)  Vt Set: 360 mL (03/09/24 0704)  PEEP/CPAP: 5 cmH20 (03/09/24 0704)  Oxygen Concentration (%): 30 (03/09/24 0726)  Peak Airway Pressure: 25 cmH20 (03/09/24 0704)  Plateau Pressure: 24 cmH20 (03/09/24 0704)  Total Ve: 6.58 L/m (03/09/24 0704)  Negative Inspiratory Force (cm H2O): 0 (03/09/24 0704)  F/VT Ratio<105 (RSBI): (!) 49.59 (03/09/24 0704)    Lines/Drains/Airways       Central Venous Catheter Line  Duration             Percutaneous Central Line - Triple Lumen  03/07/24 1347 Internal Jugular Right 1 day              Drain  Duration                  NG/OG Tube 03/07/24 1847 Center mouth 1 day         Urethral Catheter 03/07/24 1847 1 day              Airway  Duration                  Airway - Non-Surgical 03/07/24 1259 1 day         Airway - Non-Surgical 03/07/24 1300 Endotracheal Tube 1 day              Peripheral Intravenous Line  Duration                   Hemodialysis AV Fistula 04/01/20 2203 Left upper arm 1437 days         Peripheral IV - Single Lumen 03/07/24 1210 20 G Posterior;Right Hand 1 day                    Significant Labs:    CBC/Anemia Profile:  Recent Labs   Lab 03/07/24  1427 03/08/24  0420 03/09/24  0337   WBC 8.38 10.43 12.05   HGB 9.9* 9.0* 9.6*   HCT 31.5* 26.7* 28.0*    209 204   * 94 94   RDW 14.2 13.8 14.4        Chemistries:  Recent Labs   Lab 03/07/24  1427 03/07/24  1645 03/08/24  0420 03/09/24  0337    139 137 135*   K 3.6 3.5 3.6 3.5    101 98 96   CO2 19* 20* 24 23   BUN 18 23 31* 39*   CREATININE 4.3* 4.4* 4.8* 5.4*   CALCIUM 10.1 10.1 8.9 8.6*   ALBUMIN 2.8*  --   --   --    PROT 5.9*  --   --   --    BILITOT 0.8  --   --   --    ALKPHOS 111  --   --   --    ALT 75*  --   --   --    AST 82*  --   --   --    MG  --   --  1.7 1.7       All pertinent labs within the past 24 hours have been reviewed.    Significant Imaging:  I have reviewed all pertinent imaging results/findings within the past 24 hours.    ABG  Recent Labs   Lab 03/09/24  0342   PH 7.563*   PO2 144*   PCO2 27.0*   HCO3 24.4   BE 2     Assessment/Plan:     Neuro  Anoxic brain injury  - s/p cardiac arrest and suspected prolonged down time  - pt with severe myoclonus  - EEG complete  - keppra   - neuro consult  - exam and findings consistent with anoxia   - neuro checks, supportive  - avoid fevers   - plan to repeat CT +/-MRI on late Sunday or Monday  - palliative care consulted     Myoclonic jerking  - see plan for anoxia    Cardiac/Vascular  * Cardiac arrest  - cardiology following  - supportive care  - avoid fevers     Acute on chronic combined systolic and diastolic heart failure  - per cards  - monitor I&Os    Pulmonary hypertension  - severe PAP 88mmHg    Renal/  ESRD (end stage renal disease)  - nephro following  - RRT per nephro recs     Endocrine  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, with long-term  current use of insulin  - SSI    Palliative Care  Goals of care, counseling/discussion  - continue discussions and provide support  - family request meeting today at 1600    Prophylaxis Measures:  GI ppx: Pantoprazole  VTE ppx: Heparin  Glucose control: Insulin subcutaneous    Code Status: Full Code    Critical Care Time: 36 minutes  Critical secondary to Patient has a condition that poses threat to life and bodily function: cardiac arrest, resp failure in vent, anoxia       Critical care was time spent personally by me on the following activities: development of treatment plan with patient or surrogate and bedside caregivers, discussions with consultants, evaluation of patient's response to treatment, examination of patient, ordering and performing treatments and interventions, ordering and review of laboratory studies, ordering and review of radiographic studies, pulse oximetry, re-evaluation of patient's condition. This critical care time did not overlap with that of any other provider or involve time for any procedures.     Jian Flowers NP  Critical Care Medicine  [facility] - Intensive Care ([facility])

## 2024-03-09 NOTE — SUBJECTIVE & OBJECTIVE
Review of Systems   Unable to perform ROS: Intubated        Objective:     Vital Signs (Most Recent):  Temp: (!) 95.7 °F (35.4 °C) (03/09/24 0725)  Pulse: (!) 54 (03/09/24 0725)  Resp: 12 (03/09/24 0725)  BP: (!) 133/42 (03/09/24 0720)  SpO2: 98 % (03/09/24 0725) Vital Signs (24h Range):  Temp:  [95.4 °F (35.2 °C)-99 °F (37.2 °C)] 95.7 °F (35.4 °C)  Pulse:  [54-71] 54  Resp:  [0-24] 12  SpO2:  [93 %-100 %] 98 %  BP: (123-159)/(41-70) 133/42  Arterial Line BP: (112-149)/(34-49) 124/40     Weight: 57.5 kg (126 lb 12.2 oz)  Body mass index is 21.09 kg/m².      Intake/Output Summary (Last 24 hours) at 3/9/2024 0811  Last data filed at 3/9/2024 0727  Gross per 24 hour   Intake 1355.89 ml   Output 50 ml   Net 1305.89 ml        Physical Exam  Vitals reviewed.   Constitutional:       Appearance: Normal appearance. She is ill-appearing.      Interventions: She is sedated and intubated.   Cardiovascular:      Rate and Rhythm: Normal rate.      Pulses:           Radial pulses are 2+ on the right side and 2+ on the left side.   Pulmonary:      Effort: She is intubated.      Breath sounds: Normal breath sounds.      Comments: Vented, min settings   Abdominal:      General: There is no distension.      Palpations: Abdomen is soft.   Musculoskeletal:      Right lower leg: No edema.      Left lower leg: No edema.   Skin:     General: Skin is cool and dry.   Neurological:      Comments: No spontaneous mvmt or w/d from pain with extremities, grimaces with suctioning, no cough/gag noted this AM            Vents:  Vent Mode: A/C (03/09/24 0704)  Set Rate: 18 BPM (03/09/24 0704)  Vt Set: 360 mL (03/09/24 0704)  PEEP/CPAP: 5 cmH20 (03/09/24 0704)  Oxygen Concentration (%): 30 (03/09/24 0726)  Peak Airway Pressure: 25 cmH20 (03/09/24 0704)  Plateau Pressure: 24 cmH20 (03/09/24 0704)  Total Ve: 6.58 L/m (03/09/24 0704)  Negative Inspiratory Force (cm H2O): 0 (03/09/24 0704)  F/VT Ratio<105 (RSBI): (!) 49.59 (03/09/24  0704)    Lines/Drains/Airways       Central Venous Catheter Line  Duration             Percutaneous Central Line - Triple Lumen  03/07/24 1347 Internal Jugular Right 1 day              Drain  Duration                  NG/OG Tube 03/07/24 1847 Center mouth 1 day         Urethral Catheter 03/07/24 1847 1 day              Airway  Duration                  Airway - Non-Surgical 03/07/24 1259 1 day         Airway - Non-Surgical 03/07/24 1300 Endotracheal Tube 1 day              Peripheral Intravenous Line  Duration                  Hemodialysis AV Fistula 04/01/20 2203 Left upper arm 1437 days         Peripheral IV - Single Lumen 03/07/24 1210 20 G Posterior;Right Hand 1 day                    Significant Labs:    CBC/Anemia Profile:  Recent Labs   Lab 03/07/24  1427 03/08/24  0420 03/09/24  0337   WBC 8.38 10.43 12.05   HGB 9.9* 9.0* 9.6*   HCT 31.5* 26.7* 28.0*    209 204   * 94 94   RDW 14.2 13.8 14.4        Chemistries:  Recent Labs   Lab 03/07/24  1427 03/07/24  1645 03/08/24  0420 03/09/24  0337    139 137 135*   K 3.6 3.5 3.6 3.5    101 98 96   CO2 19* 20* 24 23   BUN 18 23 31* 39*   CREATININE 4.3* 4.4* 4.8* 5.4*   CALCIUM 10.1 10.1 8.9 8.6*   ALBUMIN 2.8*  --   --   --    PROT 5.9*  --   --   --    BILITOT 0.8  --   --   --    ALKPHOS 111  --   --   --    ALT 75*  --   --   --    AST 82*  --   --   --    MG  --   --  1.7 1.7       All pertinent labs within the past 24 hours have been reviewed.    Significant Imaging:  I have reviewed all pertinent imaging results/findings within the past 24 hours.

## 2024-03-09 NOTE — PLAN OF CARE
Sedation and levo turned off this AM. Pt neuro status remains unchanged. No episodes of myoclonic jerking this shift. NSR on monitor. Pt turned/repositioned with pillows. Heels floated with boots. Bed low, wheels locked, alarms audible. POC reviewed. Family meeting done today.

## 2024-03-09 NOTE — PLAN OF CARE
Titrated Norepinephrine drip accordingly to maintain MAP of >65mmhg.  Bradycardic, JESSENIA Case informed with verbal order to maintained Amiodarone drip as long as the patient is hemodynamically stable and HR not less than 40bpm.  Tolerated current vent settings.  Family ask for a family conference at 4pm, informed JESSENIA Case.

## 2024-03-09 NOTE — CONSULTS
[facility] - Intensive Care ([facility])  Nephrology  Consult Note    Patient Name: Avril Monzon  MRN: [id number]  Admission Date: 3/7/2024  Hospital Length of Stay: 2 days  Attending Provider: Zoila Valadez MD   Primary Care Physician: Rose Germain MD  Principal Problem:Cardiac arrest    Consults  Subjective:     HPI:  79-year-old female with history of end-stage renal disease.  Admitted after having cardiac arrest during a transesophageal echocardiogram.  Nephrology has been consulted for maintenance of dialysis and end-stage renal disease related issues.  Patient was seen in the intensive care unit.  In bed resting comfortably.  Intubated on multiple drips.  There is a family member at the bedside.  All nephrology related questions were answered to her satisfaction.    03/09/2024:  Patient was seen in the intensive care unit.  In bed resting comfortably.  Remains intubated.  Events from overnight noted.    Past Medical History:   Diagnosis Date    Abnormal nuclear stress test 02/12/2024    Acute hypoxemic respiratory failure 11/26/2018    Anemia     Arthritis     back, hand, knees    Back pain     Cataract     CKD stage G4/A3, GFR 15 - 29 and albumin creatinine ratio >300 mg/g     GFR 40% Jan 2014 and 33% ub 3/2014 ([facility])    Coronary artery disease involving native coronary artery of native heart 11/30/2018    Diabetic retinopathy     DM (diabetes mellitus) type II controlled, neurological manifestation     Exudative age-related macular degeneration of left eye with active choroidal neovascularization 02/05/2019    GERD (gastroesophageal reflux disease)     Glaucoma     Heart failure     Hyperlipidemia     Hypertension     Non-ST elevation MI (NSTEMI) 11/28/2018    NSTEMI (non-ST elevated myocardial infarction) 11/27/2018    Peripheral neuropathy     Polyneuropathy     Proteinuria     >6 mo     Seizures     [facility]G 1/2014 -dick CT NRI showed small vessel    Skin ulcer of toe of right foot with fat layer  exposed 10/14/2022    Stroke 2012,2014    Tobacco dependence     Type 2 diabetes with peripheral circulatory disorder, controlled        Past Surgical History:   Procedure Laterality Date    ANGIOGRAM, LOWER ARTERIAL, UNILATERAL  2/13/2024    Procedure: Angiogram, Lower Arterial, Unilateral;  Surgeon: Michelle Holman MD;  Location: [facility] CATH LAB;  Service: Cardiology;;    ARTERIOGRAPHY OF SUBCLAVIAN ARTERY  2/13/2024    Procedure: ARTERIOGRAM, SUBCLAVIAN;  Surgeon: Michelle Holman MD;  Location: [facility] CATH LAB;  Service: Cardiology;;    BREAST LUMPECTOMY Left     benign, when patient was 15 years old    BREAST MASS EXCISION      ,benign, age 15.    CATHETERIZATION OF BOTH LEFT AND RIGHT HEART N/A 11/28/2018    Procedure: CATHETERIZATION, HEART, BOTH LEFT AND RIGHT;  Surgeon: Michelle Holman MD;  Location: [facility] CATH LAB;  Service: Cardiology;  Laterality: N/A;    CATHETERIZATION OF BOTH LEFT AND RIGHT HEART N/A 11/30/2018    Procedure: CATHETERIZATION, HEART, BOTH LEFT AND RIGHT;  Surgeon: Michelle Holman MD;  Location: [facility] CATH LAB;  Service: Cardiology;  Laterality: N/A;    CORONARY ARTERY BYPASS GRAFT      CORONARY ARTERY BYPASS GRAFT (CABG) N/A 7/20/2022    Procedure: CORONARY ARTERY BYPASS GRAFT (CABG);  Surgeon: Sanju Locke MD;  Location: [facility];  Service: Cardiothoracic;  Laterality: N/A;  2-VESSEL PUMP ASSIST WITH EPI-AORTIC ULTRASOUND    CORONARY BYPASS GRAFT ANGIOGRAPHY  2/13/2024    Procedure: Bypass graft study;  Surgeon: Michelle Holman MD;  Location: [facility] CATH LAB;  Service: Cardiology;;    ENDOSCOPIC HARVEST OF VEIN Left 7/20/2022    Procedure: SURGICAL PROCUREMENT, VEIN, ENDOSCOPIC;  Surgeon: Sanju Locke MD;  Location: [facility] OR;  Service: Cardiothoracic;  Laterality: Left;    HYSTERECTOMY  1982    INJECTION OF ANESTHETIC AGENT AROUND MULTIPLE INTERCOSTAL NERVES N/A 7/20/2022    Procedure: BLOCK, NERVE, INTERCOSTAL, 2 OR MORE;  Surgeon: Sanju Locke MD;  Location: [facility] OR;  Service:  Cardiothoracic;  Laterality: N/A;  PARASTERNAL NERVE BLOCK    INSERTION OF SHUNT      LEFT HEART CATHETERIZATION Left 12/3/2018    Procedure: CATHETERIZATION, HEART, LEFT;  Surgeon: Michelle Holman MD;  Location: [facility] CATH LAB;  Service: Cardiology;  Laterality: Left;  LAD ATHERECTOMY STENT/ FEMORAL APPROACH    LEFT HEART CATHETERIZATION Left 7/13/2022    Procedure: CATHETERIZATION, HEART, LEFT;  Surgeon: Abhi Sloan MD;  Location: [facility] CATH LAB;  Service: Cardiology;  Laterality: Left;    LEFT HEART CATHETERIZATION Left 2/13/2024    Procedure: Left heart cath;  Surgeon: Michelle Holman MD;  Location: [facility] CATH LAB;  Service: Cardiology;  Laterality: Left;    OOPHORECTOMY      RIGHT HEART CATHETERIZATION Right 2/13/2024    Procedure: INSERTION, CATHETER, RIGHT HEART;  Surgeon: Michelle Holman MD;  Location: [facility] CATH LAB;  Service: Cardiology;  Laterality: Right;    TRANSESOPHAGEAL ECHOCARDIOGRAPHY N/A 3/7/2024    Procedure: ECHOCARDIOGRAM, TRANSESOPHAGEAL;  Surgeon: Colton Abreu MD;  Location: [facility] CATH LAB;  Service: Cardiology;  Laterality: N/A;    wrist cyst Right 1980s    dorsal wrist cyst       Review of patient's allergies indicates:   Allergen Reactions    Norvasc [amlodipine] Shortness Of Breath    Codeine Swelling     Other Reaction(s): Itchy skin eruption ([id number],SNOMED-CT)    Propoxyphene Swelling     Other Reaction(s): Itchy skin eruption (code-[id number],SNOMED-CT)    Atorvastatin      Muscle twitching     Current Facility-Administered Medications   Medication Frequency    acetaminophen oral solution 650 mg Q6H PRN    amiodarone 360 mg/200 mL (1.8 mg/mL) infusion Continuous    cefTRIAXone (Rocephin) 1 g in dextrose 5 % in water (D5W) 100 mL IVPB (MB+) Q24H    chlorhexidine 0.12 % solution 15 mL BID    dextrose 10% bolus 125 mL 125 mL PRN    dextrose 10% bolus 250 mL 250 mL PRN    fentaNYL 2500 mcg in 0.9% sodium chloride 250 mL infusion premix (titrating) Continuous    glucagon (human  recombinant) injection 1 mg PRN    heparin (porcine) injection 5,000 Units Q8H    insulin aspart U-100 pen 0-10 Units Q6H PRN    levETIRAcetam in NaCl (iso-os) IVPB 1,000 mg Q12H    mupirocin 2 % ointment BID    NORepinephrine 4 mg in dextrose 5% 250 mL infusion (premix) Continuous    pantoprazole injection 40 mg Daily    propofol (DIPRIVAN) 10 mg/mL infusion Continuous    sodium chloride 0.9% bolus 250 mL 250 mL PRN    sodium chloride 0.9% flush 10 mL PRN     Family History       Problem Relation (Age of Onset)    Cancer Maternal Grandmother    Diabetes Mother    Heart disease Mother    Hyperlipidemia Mother    Hypertension Mother    Muscular dystrophy Son          Tobacco Use    Smoking status: Former     Current packs/day: 0.00     Average packs/day: 1.5 packs/day for 30.0 years (45.0 ttl pk-yrs)     Types: Cigarettes     Start date: 1960     Quit date: 1990     Years since quittin.1    Smokeless tobacco: Former   Substance and Sexual Activity    Alcohol use: No     Alcohol/week: 0.0 standard drinks of alcohol    Drug use: No    Sexual activity: Not Currently     Review of Systems   Reason unable to perform ROS: Patient is intubated and sedated.     Objective:     Vital Signs (Most Recent):  Temp: 96.6 °F (35.9 °C) (24 0910)  Pulse: 77 (24 0910)  Resp: 19 (24)  BP: (!) 164/72 (24 0805)  SpO2: 99 % (2410) Vital Signs (24h Range):  Temp:  [95.4 °F (35.2 °C)-99 °F (37.2 °C)] 96.6 °F (35.9 °C)  Pulse:  [54-77] 77  Resp:  [0-24] 19  SpO2:  [93 %-100 %] 99 %  BP: (123-164)/(41-72) 164/72  Arterial Line BP: (112-159)/(34-51) 132/46     Weight: 57.5 kg (126 lb 12.2 oz) (24 0515)  Body mass index is 21.09 kg/m².  Body surface area is 1.62 meters squared.    I/O last 3 completed shifts:  In: 2610.8 [I.V.:2171.9; IV Piggyback:438.9]  Out: 75 [Urine:75]    Physical Exam  Constitutional:       General: She is not in acute distress.     Appearance: She is ill-appearing.    HENT:      Head: Normocephalic and atraumatic.   Eyes:      General: No scleral icterus.     Extraocular Movements: Extraocular movements intact.      Pupils: Pupils are equal, round, and reactive to light.   Cardiovascular:      Rate and Rhythm: Regular rhythm. Tachycardia present.   Pulmonary:      Effort: Pulmonary effort is normal.      Breath sounds: No stridor.   Musculoskeletal:      Right lower leg: No edema.      Left lower leg: No edema.   Skin:     General: Skin is warm and dry.   Neurological:      Comments: Can not be assessed.   Psychiatric:      Comments: Can not be assessed.         Significant Labs:  BMP:   Recent Labs   Lab 03/09/24  0337   *   *   K 3.5   CL 96   CO2 23   BUN 39*   CREATININE 5.4*   CALCIUM 8.6*   MG 1.7       CMP:   Recent Labs   Lab 03/07/24  1427 03/07/24  1645 03/09/24  0337   *   < > 172*   CALCIUM 10.1   < > 8.6*   ALBUMIN 2.8*  --   --    PROT 5.9*  --   --       < > 135*   K 3.6   < > 3.5   CO2 19*   < > 23      < > 96   BUN 18   < > 39*   CREATININE 4.3*   < > 5.4*   ALKPHOS 111  --   --    ALT 75*  --   --    AST 82*  --   --    BILITOT 0.8  --   --     < > = values in this interval not displayed.       All labs within the past 24 hours have been reviewed.    Significant Imaging:  Labs: Reviewed      Assessment/Plan:     Active Diagnoses:    Diagnosis Date Noted POA    PRINCIPAL PROBLEM:  Cardiac arrest [I46.9] 03/07/2024 Yes    Epilepsy [G40.909] 03/08/2024 Unknown    Anoxic brain injury [G93.1] 03/08/2024 No    Goals of care, counseling/discussion [Z71.89] 01/25/2023 Not Applicable    Acute on chronic combined systolic and diastolic heart failure [I50.43] 12/20/2021 Yes    Myoclonic jerking [G25.3] 12/20/2021 Yes    Pulmonary hypertension [I27.20] 11/28/2018 Yes    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, with long-term current use of insulin [E11.3553, Z79.4] 05/02/2016 Not Applicable    ESRD (end  stage renal disease) [N18.6]  Yes      Problems Resolved During this Admission:       Assessment and plan:    1. End-stage renal disease:  She appears to have had a devastating brain injury secondary to anoxia.  Neurology notes and Critical Care notes reviewed.    Palliative Care has been consulted and there is a family meeting scheduled for later today.  There is no indication for dialysis today.  Will continue to monitor.  Will also await results of palliative care and family meeting.    2. Electrolytes:  Potassium is stable at 3.5.    3. Acid-base: Serum bicarbonate is stable at 23.      Thank you for your consult.     Adrian Ramirez MD  Nephrology  [facility] - Intensive Care ([facility])

## 2024-03-09 NOTE — PROGRESS NOTES
Met with family (daughter, , sister, niece, cousin)  Discussed events and subsequent illness  Discussed that she has been off of sedation for several days now with no neurologic recovery thus far  EEG with burst suppression  Creatinine trending up with projected need for RRT at some point in the near future  Discussed that her chances for meaningful recovery are near zero %  Tasked them with discussing amongst immediate family re: her wishes for prolonged care should she be faced with coma, need for tracheostomy and ventilator dependence, feeding tube, etc  All voiced understanding and agreement  Questions answered

## 2024-03-10 LAB
ALLENS TEST: ABNORMAL
ANION GAP SERPL CALC-SCNC: 19 MMOL/L (ref 8–16)
BASOPHILS # BLD AUTO: 0.01 K/UL (ref 0–0.2)
BASOPHILS NFR BLD: 0.1 % (ref 0–1.9)
BUN SERPL-MCNC: 49 MG/DL (ref 8–23)
CALCIUM SERPL-MCNC: 8.3 MG/DL (ref 8.7–10.5)
CHLORIDE SERPL-SCNC: 95 MMOL/L (ref 95–110)
CO2 SERPL-SCNC: 23 MMOL/L (ref 23–29)
CREAT SERPL-MCNC: 6.3 MG/DL (ref 0.5–1.4)
DELSYS: ABNORMAL
DIFFERENTIAL METHOD BLD: ABNORMAL
EOSINOPHIL # BLD AUTO: 0 K/UL (ref 0–0.5)
EOSINOPHIL NFR BLD: 0.2 % (ref 0–8)
ERYTHROCYTE [DISTWIDTH] IN BLOOD BY AUTOMATED COUNT: 14.4 % (ref 11.5–14.5)
ERYTHROCYTE [SEDIMENTATION RATE] IN BLOOD BY WESTERGREN METHOD: 14 MM/H
EST. GFR  (NO RACE VARIABLE): 6 ML/MIN/1.73 M^2
FIO2: 30
GLUCOSE SERPL-MCNC: 124 MG/DL (ref 70–110)
HCO3 UR-SCNC: 25.8 MMOL/L (ref 24–28)
HCT VFR BLD AUTO: 27.9 % (ref 37–48.5)
HGB BLD-MCNC: 9.3 G/DL (ref 12–16)
IMM GRANULOCYTES # BLD AUTO: 0.07 K/UL (ref 0–0.04)
IMM GRANULOCYTES NFR BLD AUTO: 0.6 % (ref 0–0.5)
LYMPHOCYTES # BLD AUTO: 0.5 K/UL (ref 1–4.8)
LYMPHOCYTES NFR BLD: 4.6 % (ref 18–48)
MAGNESIUM SERPL-MCNC: 1.8 MG/DL (ref 1.6–2.6)
MCH RBC QN AUTO: 31.6 PG (ref 27–31)
MCHC RBC AUTO-ENTMCNC: 33.3 G/DL (ref 32–36)
MCV RBC AUTO: 95 FL (ref 82–98)
MODE: ABNORMAL
MONOCYTES # BLD AUTO: 0.7 K/UL (ref 0.3–1)
MONOCYTES NFR BLD: 6 % (ref 4–15)
NEUTROPHILS # BLD AUTO: 9.8 K/UL (ref 1.8–7.7)
NEUTROPHILS NFR BLD: 88.5 % (ref 38–73)
NRBC BLD-RTO: 0 /100 WBC
OHS QRS DURATION: 128 MS
OHS QTC CALCULATION: 440 MS
PCO2 BLDA: 35.8 MMHG (ref 35–45)
PEEP: 5
PH SMN: 7.47 [PH] (ref 7.35–7.45)
PLATELET # BLD AUTO: 184 K/UL (ref 150–450)
PMV BLD AUTO: 9.5 FL (ref 9.2–12.9)
PO2 BLDA: 115 MMHG (ref 80–100)
POC BE: 2 MMOL/L
POC SATURATED O2: 99 % (ref 95–100)
POCT GLUCOSE: 115 MG/DL (ref 70–110)
POCT GLUCOSE: 134 MG/DL (ref 70–110)
POCT GLUCOSE: 140 MG/DL (ref 70–110)
POCT GLUCOSE: 156 MG/DL (ref 70–110)
POCT GLUCOSE: 166 MG/DL (ref 70–110)
POTASSIUM SERPL-SCNC: 4.2 MMOL/L (ref 3.5–5.1)
RBC # BLD AUTO: 2.94 M/UL (ref 4–5.4)
SAMPLE: ABNORMAL
SITE: ABNORMAL
SODIUM SERPL-SCNC: 137 MMOL/L (ref 136–145)
TRIGL SERPL-MCNC: 74 MG/DL (ref 30–150)
VT: 360
WBC # BLD AUTO: 11.09 K/UL (ref 3.9–12.7)

## 2024-03-10 PROCEDURE — 63600175 PHARM REV CODE 636 W HCPCS: Mod: HCNC | Performed by: NURSE PRACTITIONER

## 2024-03-10 PROCEDURE — 27100171 HC OXYGEN HIGH FLOW UP TO 24 HOURS: Mod: HCNC

## 2024-03-10 PROCEDURE — 25000003 PHARM REV CODE 250: Mod: HCNC | Performed by: NURSE PRACTITIONER

## 2024-03-10 PROCEDURE — 99900026 HC AIRWAY MAINTENANCE (STAT): Mod: HCNC

## 2024-03-10 PROCEDURE — 82803 BLOOD GASES ANY COMBINATION: CPT | Mod: HCNC

## 2024-03-10 PROCEDURE — 80048 BASIC METABOLIC PNL TOTAL CA: CPT | Mod: HCNC | Performed by: NURSE PRACTITIONER

## 2024-03-10 PROCEDURE — 84478 ASSAY OF TRIGLYCERIDES: CPT | Mod: HCNC | Performed by: SPECIALIST

## 2024-03-10 PROCEDURE — 94761 N-INVAS EAR/PLS OXIMETRY MLT: CPT | Mod: HCNC,XB

## 2024-03-10 PROCEDURE — 83735 ASSAY OF MAGNESIUM: CPT | Mod: HCNC | Performed by: NURSE PRACTITIONER

## 2024-03-10 PROCEDURE — 63600175 PHARM REV CODE 636 W HCPCS: Mod: HCNC | Performed by: SPECIALIST

## 2024-03-10 PROCEDURE — 25000003 PHARM REV CODE 250: Mod: HCNC | Performed by: INTERNAL MEDICINE

## 2024-03-10 PROCEDURE — 37799 UNLISTED PX VASCULAR SURGERY: CPT | Mod: HCNC

## 2024-03-10 PROCEDURE — 25000003 PHARM REV CODE 250: Mod: HCNC | Performed by: SPECIALIST

## 2024-03-10 PROCEDURE — 85025 COMPLETE CBC W/AUTO DIFF WBC: CPT | Mod: HCNC | Performed by: NURSE PRACTITIONER

## 2024-03-10 PROCEDURE — 99900035 HC TECH TIME PER 15 MIN (STAT): Mod: HCNC

## 2024-03-10 PROCEDURE — 82800 BLOOD PH: CPT | Mod: HCNC

## 2024-03-10 PROCEDURE — 94003 VENT MGMT INPAT SUBQ DAY: CPT | Mod: HCNC

## 2024-03-10 PROCEDURE — 20000000 HC ICU ROOM: Mod: HCNC

## 2024-03-10 PROCEDURE — C9113 INJ PANTOPRAZOLE SODIUM, VIA: HCPCS | Mod: HCNC | Performed by: NURSE PRACTITIONER

## 2024-03-10 PROCEDURE — 99232 SBSQ HOSP IP/OBS MODERATE 35: CPT | Mod: HCNC,,, | Performed by: INTERNAL MEDICINE

## 2024-03-10 RX ORDER — AMIODARONE HYDROCHLORIDE 200 MG/1
200 TABLET ORAL 2 TIMES DAILY
Status: DISCONTINUED | OUTPATIENT
Start: 2024-03-10 | End: 2024-03-11

## 2024-03-10 RX ADMIN — LEVETIRACETAM INJECTION 1000 MG: 10 INJECTION INTRAVENOUS at 09:03

## 2024-03-10 RX ADMIN — HEPARIN SODIUM 5000 UNITS: 5000 INJECTION INTRAVENOUS; SUBCUTANEOUS at 06:03

## 2024-03-10 RX ADMIN — INSULIN ASPART 2 UNITS: 100 INJECTION, SOLUTION INTRAVENOUS; SUBCUTANEOUS at 05:03

## 2024-03-10 RX ADMIN — AMIODARONE HYDROCHLORIDE 200 MG: 200 TABLET ORAL at 09:03

## 2024-03-10 RX ADMIN — CEFTRIAXONE 1 G: 1 INJECTION, POWDER, FOR SOLUTION INTRAMUSCULAR; INTRAVENOUS at 05:03

## 2024-03-10 RX ADMIN — HEPARIN SODIUM 5000 UNITS: 5000 INJECTION INTRAVENOUS; SUBCUTANEOUS at 10:03

## 2024-03-10 RX ADMIN — ACETAMINOPHEN 650 MG: 650 SOLUTION ORAL at 09:03

## 2024-03-10 RX ADMIN — PANTOPRAZOLE SODIUM 40 MG: 40 INJECTION, POWDER, FOR SOLUTION INTRAVENOUS at 09:03

## 2024-03-10 RX ADMIN — HEPARIN SODIUM 5000 UNITS: 5000 INJECTION INTRAVENOUS; SUBCUTANEOUS at 02:03

## 2024-03-10 RX ADMIN — MUPIROCIN: 20 OINTMENT TOPICAL at 09:03

## 2024-03-10 RX ADMIN — AMIODARONE HYDROCHLORIDE 0.5 MG/MIN: 1.8 INJECTION, SOLUTION INTRAVENOUS at 04:03

## 2024-03-10 RX ADMIN — CHLORHEXIDINE GLUCONATE 0.12% ORAL RINSE 15 ML: 1.2 LIQUID ORAL at 09:03

## 2024-03-10 RX ADMIN — AMIODARONE HYDROCHLORIDE 200 MG: 200 TABLET ORAL at 11:03

## 2024-03-10 RX ADMIN — ACETAMINOPHEN 650 MG: 650 SOLUTION ORAL at 06:03

## 2024-03-10 RX ADMIN — INSULIN ASPART 2 UNITS: 100 INJECTION, SOLUTION INTRAVENOUS; SUBCUTANEOUS at 12:03

## 2024-03-10 NOTE — SUBJECTIVE & OBJECTIVE
Review of Systems   Unable to perform ROS: Intubated        Objective:     Vital Signs (Most Recent):  Temp: 99.7 °F (37.6 °C) (03/10/24 0738)  Pulse: 81 (03/10/24 0738)  Resp: 18 (03/10/24 0738)  BP: (!) 120/56 (03/10/24 0720)  SpO2: 100 % (03/10/24 0738) Vital Signs (24h Range):  Temp:  [96.1 °F (35.6 °C)-100.9 °F (38.3 °C)] 99.7 °F (37.6 °C)  Pulse:  [68-90] 81  Resp:  [8-24] 18  SpO2:  [88 %-100 %] 100 %  BP: (117-146)/(37-66) 120/56  Arterial Line BP: (118-172)/() 122/36     Weight: 57.5 kg (126 lb 12.2 oz)  Body mass index is 21.09 kg/m².      Intake/Output Summary (Last 24 hours) at 3/10/2024 0814  Last data filed at 3/10/2024 0700  Gross per 24 hour   Intake 681.44 ml   Output 40 ml   Net 641.44 ml        Physical Exam  Vitals reviewed.   Constitutional:       Appearance: She is ill-appearing.      Interventions: She is intubated.   Cardiovascular:      Rate and Rhythm: Normal rate.   Pulmonary:      Effort: She is intubated.      Breath sounds: Normal breath sounds.      Comments: vented without seen for sedation / restraints   Abdominal:      General: There is no distension.      Palpations: Abdomen is soft.   Musculoskeletal:      Right lower leg: No edema.      Left lower leg: No edema.   Skin:     General: Skin is warm and dry.   Neurological:      Comments: Left pupil sluggish, right eye opaque, breaths over the vent, minimal posturing/grimace to pain, no cough / gag             Vents:  Vent Mode: A/C (03/10/24 0715)  Set Rate: 14 BPM (03/10/24 0715)  Vt Set: 360 mL (03/10/24 0715)  PEEP/CPAP: 5 cmH20 (03/10/24 0715)  Oxygen Concentration (%): 30 (03/10/24 0739)  Peak Airway Pressure: 27 cmH20 (03/10/24 0715)  Plateau Pressure: 23 cmH20 (03/10/24 0715)  Total Ve: 7.58 L/m (03/10/24 0715)  Negative Inspiratory Force (cm H2O): 0 (03/10/24 0715)  F/VT Ratio<105 (RSBI): (!) 51.91 (03/10/24 0715)    Lines/Drains/Airways       Central Venous Catheter Line  Duration             Percutaneous Central Line  - Triple Lumen  03/07/24 1347 Internal Jugular Right 2 days              Drain  Duration                  NG/OG Tube 03/07/24 1847 Center mouth 2 days         Urethral Catheter 03/07/24 1847 2 days              Airway  Duration                  Airway - Non-Surgical 03/07/24 1259 2 days         Airway - Non-Surgical 03/07/24 1300 Endotracheal Tube 2 days              Peripheral Intravenous Line  Duration                  Hemodialysis AV Fistula 04/01/20 2203 Left upper arm 1438 days         Peripheral IV - Single Lumen 03/07/24 1210 20 G Posterior;Right Hand 2 days                    Significant Labs:    CBC/Anemia Profile:  Recent Labs   Lab 03/09/24  0337 03/10/24  0406   WBC 12.05 11.09   HGB 9.6* 9.3*   HCT 28.0* 27.9*    184   MCV 94 95   RDW 14.4 14.4        Chemistries:  Recent Labs   Lab 03/09/24  0337 03/10/24  0406   * 137   K 3.5 4.2   CL 96 95   CO2 23 23   BUN 39* 49*   CREATININE 5.4* 6.3*   CALCIUM 8.6* 8.3*   MG 1.7 1.8       All pertinent labs within the past 24 hours have been reviewed.    Significant Imaging:  I have reviewed all pertinent imaging results/findings within the past 24 hours.

## 2024-03-10 NOTE — CONSULTS
[facility] - Intensive Care ([facility])  Adult Nutrition  Consult Note    SUMMARY     Recommendations  1. Recommend pt be initiated onto EN when medically appropriate.   - [employer] Renal. Initiate at 10 mL/hr, advance as pt tolerates or per MD/NP. Aim for goal rate of 25 mL/hr.   - Formula at goal rate will provide: 1200 kcal (84% EEN), 54 g Protein (100% EPN), 430.2 mL/day Free water.   - 165 mL fwf q4hr (990 mL/day) or per MD/NP.   - FWF + Free water = 1420.2 (99% EFN).   - Check Mg, Na, K+, Phos, and Glu before and during initiation, correct as indicated.   2. Weigh daily.    Goals:   1. Pt will be initiated onto EN within 24 hrs.   2. Pt will consume and tolerate >50% of EEN/EPN prior to RD follow up  Nutrition Goal Status: new  Communication of RD Recs: other (comment) (POC: Sticky note)    Assessment and Plan    Nutrition Problem  Inadequate PO intake    Related to (etiology):   Decreased ability to consume sufficient nutrition    Signs and Symptoms (as evidenced by):   NPO, Vent    Interventions(treatment strategy):  1. Enteral Nutrition  2. Collaboration with other providers    Nutrition Diagnosis Status:   New       Malnutrition Assessment     Skin (Micronutrient): bruised, dry, turgor reduced (Robert = 10)                                 Reason for Assessment    Reason For Assessment: consult, new tube feeding  Diagnosis: cardiac disease, surgery/postoperative complications (Cardiac arrest)  Relevant Medical History: Cardiac arrest, ESRD, T2DM, Pulmonary hypertension, CHF, Epilepsy, Anoxic brain injury, CAD, CVA, HLD  Interdisciplinary Rounds: did not attend  General Information Comments:   3/10/24: 79 y.o female admitted with active principal problem of Cardiac arrest. Pt currently in ICU intubated (Total Ve: 7.58) tolerating current vent settings. Sedation has been stopped. Pt remains unresponsive w/ no neurologic recovery thus far. Pt noted to have Anoxic brain injury, s/p cardiac arrest and suspected  "prolonged down time. Pt with ESRD, Nephrology plan for CRRT in near future but currently on hold d/t unchanged critical condition and no acute need. NFPE not performed, Dietitian will assess the need for NFPE during RD follow up. Pt currently charted to weigh 126 lb 12.2 oz, BMI 21.09 (Underweight for age).  Nutrition Discharge Planning: Renal, Diabetic, Cardiac    Nutrition Risk Screen    Nutrition Risk Screen: tube feeding or parenteral nutrition    Nutrition/Diet History    Spiritual, Cultural Beliefs, [employer] Practices, Values that Affect Care:  (JOEY)  Food Allergies: NKFA  Factors Affecting Nutritional Intake: NPO, on mechanical ventilation    Anthropometrics    Temp: 99.1 °F (37.3 °C)  Height Method: Stated  Height: 5' 5" (165.1 cm)  Height (inches): 65 in  Weight Method: Standard Scale  Weight: 57.5 kg (126 lb 12.2 oz)  Weight (lb): 126.77 lb  Ideal Body Weight (IBW), Female: 125 lb  % Ideal Body Weight, Female (lb): 101.42 %  BMI (Calculated): 21.1  BMI Grade: 18.5-24.9 - normal (Underweight for age)     Wt Readings from Last 20 Encounters:   03/10/24 57.5 kg (126 lb 12.2 oz)   03/02/24 64.7 kg (142 lb 10.2 oz)   02/19/24 57.1 kg (125 lb 14.1 oz)   02/13/24 58.2 kg (128 lb 4.9 oz)   01/30/24 58.1 kg (128 lb)   01/30/24 58.2 kg (128 lb 4.9 oz)   01/17/24 58.4 kg (128 lb 12 oz)   01/11/24 59.4 kg (130 lb 15.3 oz)   01/04/24 59.5 kg (131 lb 1 oz)   12/28/23 59.8 kg (131 lb 15.1 oz)   11/28/23 60.4 kg (133 lb 1.6 oz)   11/22/23 61 kg (134 lb 8 oz)   11/14/23 60.9 kg (134 lb 4.2 oz)   09/22/23 61.5 kg (135 lb 8 oz)   09/14/23 62.2 kg (137 lb 1.6 oz)   09/12/23 61.9 kg (136 lb 7.4 oz)   09/07/23 62.1 kg (136 lb 14.4 oz)   08/31/23 62.1 kg (137 lb)   08/22/23 62.5 kg (137 lb 12.8 oz)   08/17/23 62.9 kg (138 lb 10.7 oz)   ]  Lab/Procedures/Meds  BMP  Lab Results   Component Value Date     03/10/2024    K 4.2 03/10/2024    CL 95 03/10/2024    CO2 23 03/10/2024    BUN 49 (H) 03/10/2024    CREATININE 6.3 (H) " 03/10/2024    CALCIUM 8.3 (L) 03/10/2024    ANIONGAP 19 (H) 03/10/2024    EGFRNORACEVR 6 (A) 03/10/2024     Lab Results   Component Value Date    CALCIUM 8.3 (L) 03/10/2024    PHOS 2.5 (L) 11/14/2023     Recent Labs   Lab 03/10/24  0529   POCTGLUCOSE 115*     Lab Results   Component Value Date    ALT 75 (H) 03/07/2024    AST 82 (H) 03/07/2024    ALKPHOS 111 03/07/2024    BILITOT 0.8 03/07/2024       Pertinent Labs Reviewed: reviewed  Pertinent Medications Reviewed: reviewed  Scheduled Meds:   cefTRIAXone (Rocephin) IV (PEDS and ADULTS)  1 g Intravenous Q24H    chlorhexidine  15 mL Mouth/Throat BID    heparin (porcine)  5,000 Units Subcutaneous Q8H    levETIRAcetam (Keppra) IV (PEDS and ADULTS)  1,000 mg Intravenous Q12H    mupirocin   Nasal BID    pantoprazole  40 mg Intravenous Daily     Continuous Infusions:   amiodarone in dextrose 5% 0.5 mg/min (03/10/24 0700)     PRN Meds:.acetaminophen, dextrose 10%, dextrose 10%, glucagon (human recombinant), insulin aspart U-100, sodium chloride 0.9%, sodium chloride 0.9%    Physical Findings/Assessment         Estimated/Assessed Needs    Weight Used For Calorie Calculations: 57.5 kg (126 lb 12.2 oz)  Energy Calorie Requirements (kcal): 1434 (Simms state per Intubated (Total Ve: 7.54 L/M))  Energy Need Method: Simms State  Protein Requirements: 46-58 (0.8-1.0 g/kg per GREGG vs ESRD/DM. Possible CRRT in near future)  Weight Used For Protein Calculations: 57.5 kg (126 lb 12.2 oz)  Fluid Requirements (mL): 500 + Total Output     RDA Method (mL): 1434  CHO Requirement: 161      Nutrition Prescription Ordered    Current Diet Order: NPO    Evaluation of Received Nutrient/Fluid Intake    I/O: +4086.5 Since Admit  Energy Calories Required: not meeting needs  Protein Required: not meeting needs  Fluid Required: not meeting needs  Tolerance: not tolerating (NPO/Vent)  % Intake of Estimated Energy Needs: 0 - 25 %  % Meal Intake: NPO    Nutrition Risk    Level of Risk/Frequency of  Follow-up: high (x2 weekly)       Monitor and Evaluation    Food and Nutrient Intake: energy intake, enteral nutrition intake  Food and Nutrient Adminstration: enteral and parenteral nutrition administration  Anthropometric Measurements: weight, weight change, body mass index  Biochemical Data, Medical Tests and Procedures: electrolyte and renal panel, glucose/endocrine profile, inflammatory profile  Nutrition-Focused Physical Findings: overall appearance, extremities, muscles and bones, head and eyes, skin       Nutrition Follow-Up    RD Follow-up?: Yes  Rian Leonardo MS, Registration Eligible, Provisional LDN

## 2024-03-10 NOTE — PROGRESS NOTES
[facility] - Intensive Care ([facility])  Critical Care Medicine  Progress Note    Patient Name: Avril Monzon  MRN: [id number]  Admission Date: 3/7/2024  Hospital Length of Stay: 3 days  Code Status: Full Code  Attending Provider: Zoila Valadez MD  Primary Care Provider: Rose Germain MD   Principal Problem: Cardiac arrest    Subjective:     HPI:  79yr old with CMP ef 45%, Severe pulmonary HTN PAP > 85, Valvular heart disease, DM, ESRD, CVA, Retinopathy was getting outpatient EUNICE.  She was given diprivan for the procedure and post procedure remained unresponsive, then during EKG went from tachycardia to asystole.   She was given one roud of Epi, several boluses of vasopressors by anesthesia and was resuscitated for a few minutes. Intubated and bough to ICU.   In ICU CVL was placed and she was placed on levo. She had two more VT / VF episodes requiring CPR, Amiodarone bolus was given in the previous arrest and amiodarone infusion was initiated.   An A line was placed and CT head and other orders were placed. Family was updated by Cardiology. She was also given Ca and bicarbonate times 2 during resuscitation.     Hospital/ICU Course:  3/8. ABG reviewed. Settings changed. Myoclonic activity. Suspect a longer arrest duration in recovery. Tele Neuro consult, Cont EEG and other test pending.  3/9: remains neurologically devastated on the vent, family request meeting at 1600 today, continue supportive care   3/10: no meaningful recovery from a neuro standpoint, remains off sedation on the vent, supportive care     Review of Systems   Unable to perform ROS: Intubated        Objective:     Vital Signs (Most Recent):  Temp: 99.7 °F (37.6 °C) (03/10/24 0738)  Pulse: 81 (03/10/24 0738)  Resp: 18 (03/10/24 0738)  BP: (!) 120/56 (03/10/24 0720)  SpO2: 100 % (03/10/24 0738) Vital Signs (24h Range):  Temp:  [96.1 °F (35.6 °C)-100.9 °F (38.3 °C)] 99.7 °F (37.6 °C)  Pulse:  [68-90] 81  Resp:  [8-24] 18  SpO2:  [88 %-100 %]  100 %  BP: (117-146)/(37-66) 120/56  Arterial Line BP: (118-172)/() 122/36     Weight: 57.5 kg (126 lb 12.2 oz)  Body mass index is 21.09 kg/m².      Intake/Output Summary (Last 24 hours) at 3/10/2024 0814  Last data filed at 3/10/2024 0700  Gross per 24 hour   Intake 681.44 ml   Output 40 ml   Net 641.44 ml        Physical Exam  Vitals reviewed.   Constitutional:       Appearance: She is ill-appearing.      Interventions: She is intubated.   Cardiovascular:      Rate and Rhythm: Normal rate.   Pulmonary:      Effort: She is intubated.      Breath sounds: Normal breath sounds.      Comments: vented without seen for sedation / restraints   Abdominal:      General: There is no distension.      Palpations: Abdomen is soft.   Musculoskeletal:      Right lower leg: No edema.      Left lower leg: No edema.   Skin:     General: Skin is warm and dry.   Neurological:      Comments: Left pupil sluggish, right eye opaque, breaths over the vent, minimal posturing/grimace to pain, no cough / gag             Vents:  Vent Mode: A/C (03/10/24 0715)  Set Rate: 14 BPM (03/10/24 0715)  Vt Set: 360 mL (03/10/24 0715)  PEEP/CPAP: 5 cmH20 (03/10/24 0715)  Oxygen Concentration (%): 30 (03/10/24 0739)  Peak Airway Pressure: 27 cmH20 (03/10/24 0715)  Plateau Pressure: 23 cmH20 (03/10/24 0715)  Total Ve: 7.58 L/m (03/10/24 0715)  Negative Inspiratory Force (cm H2O): 0 (03/10/24 0715)  F/VT Ratio<105 (RSBI): (!) 51.91 (03/10/24 0715)    Lines/Drains/Airways       Central Venous Catheter Line  Duration             Percutaneous Central Line - Triple Lumen  03/07/24 1347 Internal Jugular Right 2 days              Drain  Duration                  NG/OG Tube 03/07/24 1847 Center mouth 2 days         Urethral Catheter 03/07/24 1847 2 days              Airway  Duration                  Airway - Non-Surgical 03/07/24 1259 2 days         Airway - Non-Surgical 03/07/24 1300 Endotracheal Tube 2 days              Peripheral Intravenous Line   Duration                  Hemodialysis AV Fistula 04/01/20 2203 Left upper arm 1438 days         Peripheral IV - Single Lumen 03/07/24 1210 20 G Posterior;Right Hand 2 days                    Significant Labs:    CBC/Anemia Profile:  Recent Labs   Lab 03/09/24  0337 03/10/24  0406   WBC 12.05 11.09   HGB 9.6* 9.3*   HCT 28.0* 27.9*    184   MCV 94 95   RDW 14.4 14.4        Chemistries:  Recent Labs   Lab 03/09/24  0337 03/10/24  0406   * 137   K 3.5 4.2   CL 96 95   CO2 23 23   BUN 39* 49*   CREATININE 5.4* 6.3*   CALCIUM 8.6* 8.3*   MG 1.7 1.8       All pertinent labs within the past 24 hours have been reviewed.    Significant Imaging:  I have reviewed all pertinent imaging results/findings within the past 24 hours.    ABG  Recent Labs   Lab 03/10/24  0404   PH 7.466*   PO2 115*   PCO2 35.8   HCO3 25.8   BE 2     Assessment/Plan:     Neuro  Anoxic brain injury  - s/p cardiac arrest and suspected prolonged down time   - EEG complete and neuro eval done, continue keppra   - neuro checks, supportive care, avoid fevers   - plan to repeat CT +/-MRI on late Sunday or Monday  - palliative care consulted, ongoing family discussions     Myoclonic jerking  - see plan for anoxia    Cardiac/Vascular  * Cardiac arrest  - cardiology following  - supportive care  - avoid fevers   - see plan for anoxia     Acute on chronic combined systolic and diastolic heart failure  - per cards  - monitor I&Os    Pulmonary hypertension  - severe PAP 88mmHg    Renal/  ESRD (end stage renal disease)  - nephro following with RRT per their recs     Endocrine  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, with long-term current use of insulin  - SSI    Palliative Care  Goals of care, counseling/discussion  - continue discussions and provide support    Prophylaxis Measures:  GI ppx: Pantoprazole  VTE ppx: Heparin  Glucose control: Insulin subcutaneous    Code Status: Full Code    Critical Care Time: 32  minutes  Critical secondary to Patient has a condition that poses threat to life and bodily function: resp failure on vent, anoxic brain injury       Critical care was time spent personally by me on the following activities: development of treatment plan with patient or surrogate and bedside caregivers, discussions with consultants, evaluation of patient's response to treatment, examination of patient, ordering and performing treatments and interventions, ordering and review of laboratory studies, ordering and review of radiographic studies, pulse oximetry, re-evaluation of patient's condition. This critical care time did not overlap with that of any other provider or involve time for any procedures.     Jian Flowers, NP  Critical Care Medicine  '[place] - Intensive Care ([facility])

## 2024-03-10 NOTE — ASSESSMENT & PLAN NOTE
- s/p cardiac arrest and suspected prolonged down time   - EEG complete and neuro eval done, continue keppra   - neuro checks, supportive care, avoid fevers   - plan to repeat CT +/-MRI on late Sunday or Monday  - palliative care consulted, ongoing family discussions

## 2024-03-10 NOTE — CONSULTS
[facility] ([facility])  Nephrology  Consult Note    Patient Name: Avril Monzon  MRN: [id number]  Admission Date: 3/7/2024  Hospital Length of Stay: 3 days  Attending Provider: Zoila Valadez MD   Primary Care Physician: Rose Germain MD  Principal Problem:Cardiac arrest    Consults  Subjective:     HPI:  79-year-old female with history of end-stage renal disease.  Admitted after having cardiac arrest during a transesophageal echocardiogram.  Nephrology has been consulted for maintenance of dialysis and end-stage renal disease related issues.  Patient was seen in the intensive care unit.  In bed resting comfortably.  Intubated on multiple drips.  There is a family member at the bedside.  All nephrology related questions were answered to her satisfaction.    03/09/2024:  Patient was seen in the intensive care unit.  In bed resting comfortably.  Remains intubated.  Events from overnight noted.    03/10/2024:  Patient was seen in the intensive care unit.  Remains intubated.  Sedation was discontinued.  Documented GCS 3.  Felt to have severe anoxic brain injury.    Past Medical History:   Diagnosis Date    Abnormal nuclear stress test 02/12/2024    Acute hypoxemic respiratory failure 11/26/2018    Anemia     Arthritis     back, hand, knees    Back pain     Cataract     CKD stage G4/A3, GFR 15 - 29 and albumin creatinine ratio >300 mg/g     GFR 40% Jan 2014 and 33% ub 3/2014 (BRG)    Coronary artery disease involving native coronary artery of native heart 11/30/2018    Diabetic retinopathy     DM (diabetes mellitus) type II controlled, neurological manifestation     Exudative age-related macular degeneration of left eye with active choroidal neovascularization 02/05/2019    GERD (gastroesophageal reflux disease)     Glaucoma     Heart failure     Hyperlipidemia     Hypertension     Non-ST elevation MI (NSTEMI) 11/28/2018    NSTEMI (non-ST elevated myocardial infarction) 11/27/2018    Peripheral  neuropathy     Polyneuropathy     Proteinuria     >6 mo     Seizures     BRG 1/2014 -dick CT NRI showed small vessel    Skin ulcer of toe of right foot with fat layer exposed 10/14/2022    Stroke 2012,2014    Tobacco dependence     Type 2 diabetes with peripheral circulatory disorder, controlled        Past Surgical History:   Procedure Laterality Date    ANGIOGRAM, LOWER ARTERIAL, UNILATERAL  2/13/2024    Procedure: Angiogram, Lower Arterial, Unilateral;  Surgeon: Michelle Holman MD;  Location: [facility] CATH LAB;  Service: Cardiology;;    ARTERIOGRAPHY OF SUBCLAVIAN ARTERY  2/13/2024    Procedure: ARTERIOGRAM, SUBCLAVIAN;  Surgeon: Michelle Holman MD;  Location: [facility] CATH LAB;  Service: Cardiology;;    BREAST LUMPECTOMY Left     benign, when patient was 15 years old    BREAST MASS EXCISION      ,benign, age 15.    CATHETERIZATION OF BOTH LEFT AND RIGHT HEART N/A 11/28/2018    Procedure: CATHETERIZATION, HEART, BOTH LEFT AND RIGHT;  Surgeon: Michelle Holman MD;  Location: [facility] CATH LAB;  Service: Cardiology;  Laterality: N/A;    CATHETERIZATION OF BOTH LEFT AND RIGHT HEART N/A 11/30/2018    Procedure: CATHETERIZATION, HEART, BOTH LEFT AND RIGHT;  Surgeon: Michelle Holman MD;  Location: [facility] CATH LAB;  Service: Cardiology;  Laterality: N/A;    CORONARY ARTERY BYPASS GRAFT      CORONARY ARTERY BYPASS GRAFT (CABG) N/A 7/20/2022    Procedure: CORONARY ARTERY BYPASS GRAFT (CABG);  Surgeon: Sanju Locke MD;  Location: [facility];  Service: Cardiothoracic;  Laterality: N/A;  2-VESSEL PUMP ASSIST WITH EPI-AORTIC ULTRASOUND    CORONARY BYPASS GRAFT ANGIOGRAPHY  2/13/2024    Procedure: Bypass graft study;  Surgeon: Michelle Holman MD;  Location: [facility] CATH LAB;  Service: Cardiology;;    ENDOSCOPIC HARVEST OF VEIN Left 7/20/2022    Procedure: SURGICAL PROCUREMENT, VEIN, ENDOSCOPIC;  Surgeon: Sanju Locke MD;  Location: [facility] OR;  Service: Cardiothoracic;  Laterality: Left;    HYSTERECTOMY  1982    INJECTION OF ANESTHETIC  AGENT AROUND MULTIPLE INTERCOSTAL NERVES N/A 7/20/2022    Procedure: BLOCK, NERVE, INTERCOSTAL, 2 OR MORE;  Surgeon: Sanju Locke MD;  Location: [facility] OR;  Service: Cardiothoracic;  Laterality: N/A;  PARASTERNAL NERVE BLOCK    INSERTION OF SHUNT      LEFT HEART CATHETERIZATION Left 12/3/2018    Procedure: CATHETERIZATION, HEART, LEFT;  Surgeon: Michelle Holman MD;  Location: [facility] CATH LAB;  Service: Cardiology;  Laterality: Left;  LAD ATHERECTOMY STENT/ FEMORAL APPROACH    LEFT HEART CATHETERIZATION Left 7/13/2022    Procedure: CATHETERIZATION, HEART, LEFT;  Surgeon: Abhi Sloan MD;  Location: [facility] CATH LAB;  Service: Cardiology;  Laterality: Left;    LEFT HEART CATHETERIZATION Left 2/13/2024    Procedure: Left heart cath;  Surgeon: Michelle Holman MD;  Location: [facility] CATH LAB;  Service: Cardiology;  Laterality: Left;    OOPHORECTOMY      RIGHT HEART CATHETERIZATION Right 2/13/2024    Procedure: INSERTION, CATHETER, RIGHT HEART;  Surgeon: Michelle Holman MD;  Location: [facility] CATH LAB;  Service: Cardiology;  Laterality: Right;    TRANSESOPHAGEAL ECHOCARDIOGRAPHY N/A 3/7/2024    Procedure: ECHOCARDIOGRAM, TRANSESOPHAGEAL;  Surgeon: Colton Abreu MD;  Location: [facility] CATH LAB;  Service: Cardiology;  Laterality: N/A;    wrist cyst Right 1980s    dorsal wrist cyst       Review of patient's allergies indicates:   Allergen Reactions    Norvasc [amlodipine] Shortness Of Breath    Codeine Swelling     Other Reaction(s): Itchy skin eruption (code-[id number],SNOMED-CT)    Propoxyphene Swelling     Other Reaction(s): Itchy skin eruption (code-[id number],SNOMED-CT)    Atorvastatin      Muscle twitching     Current Facility-Administered Medications   Medication Frequency    acetaminophen oral solution 650 mg Q6H PRN    amiodarone 360 mg/200 mL (1.8 mg/mL) infusion Continuous    cefTRIAXone (Rocephin) 1 g in dextrose 5 % in water (D5W) 100 mL IVPB (MB+) Q24H    chlorhexidine 0.12 % solution 15 mL BID    dextrose 10% bolus  125 mL 125 mL PRN    dextrose 10% bolus 250 mL 250 mL PRN    glucagon (human recombinant) injection 1 mg PRN    heparin (porcine) injection 5,000 Units Q8H    insulin aspart U-100 pen 0-10 Units Q6H PRN    levETIRAcetam in NaCl (iso-os) IVPB 1,000 mg Q12H    mupirocin 2 % ointment BID    pantoprazole injection 40 mg Daily    sodium chloride 0.9% bolus 250 mL 250 mL PRN    sodium chloride 0.9% flush 10 mL PRN     Family History       Problem Relation (Age of Onset)    Cancer Maternal Grandmother    Diabetes Mother    Heart disease Mother    Hyperlipidemia Mother    Hypertension Mother    Muscular dystrophy Son          Tobacco Use    Smoking status: Former     Current packs/day: 0.00     Average packs/day: 1.5 packs/day for 30.0 years (45.0 ttl pk-yrs)     Types: Cigarettes     Start date: 1960     Quit date: 1990     Years since quittin.1    Smokeless tobacco: Former   Substance and Sexual Activity    Alcohol use: No     Alcohol/week: 0.0 standard drinks of alcohol    Drug use: No    Sexual activity: Not Currently     Review of Systems   Reason unable to perform ROS: Patient is intubated and sedated.     Objective:     Vital Signs (Most Recent):  Temp: 99.1 °F (37.3 °C) (03/10/24 0939)  Pulse: 78 (03/10/24 0939)  Resp: 19 (03/10/24 0939)  BP: (!) 148/63 (03/10/24 0935)  SpO2: 100 % (03/10/24 0939) Vital Signs (24h Range):  Temp:  [97.7 °F (36.5 °C)-100.9 °F (38.3 °C)] 99.1 °F (37.3 °C)  Pulse:  [78-90] 78  Resp:  [8-24] 19  SpO2:  [88 %-100 %] 100 %  BP: (112-148)/(37-66) 148/63  Arterial Line BP: (113-172)/() 113/29     Weight: 57.5 kg (126 lb 12.2 oz) (03/10/24 0907)  Body mass index is 21.09 kg/m².  Body surface area is 1.62 meters squared.    I/O last 3 completed shifts:  In: 1429.5 [I.V.:987.5; IV Piggyback:442]  Out: 60 [Urine:60]    Physical Exam  Constitutional:       General: She is not in acute distress.     Appearance: She is ill-appearing.   HENT:      Head: Normocephalic and  atraumatic.   Eyes:      General: No scleral icterus.     Extraocular Movements: Extraocular movements intact.      Pupils: Pupils are equal, round, and reactive to light.   Cardiovascular:      Rate and Rhythm: Regular rhythm. Tachycardia present.   Pulmonary:      Effort: Pulmonary effort is normal.      Breath sounds: No stridor.   Musculoskeletal:      Right lower leg: No edema.      Left lower leg: No edema.   Skin:     General: Skin is warm and dry.   Neurological:      Comments: Can not be assessed.   Psychiatric:      Comments: Can not be assessed.         Significant Labs:  BMP:   Recent Labs   Lab 03/10/24  0406   *      K 4.2   CL 95   CO2 23   BUN 49*   CREATININE 6.3*   CALCIUM 8.3*   MG 1.8       CMP:   Recent Labs   Lab 03/07/24  1427 03/07/24  1645 03/10/24  0406   *   < > 124*   CALCIUM 10.1   < > 8.3*   ALBUMIN 2.8*  --   --    PROT 5.9*  --   --       < > 137   K 3.6   < > 4.2   CO2 19*   < > 23      < > 95   BUN 18   < > 49*   CREATININE 4.3*   < > 6.3*   ALKPHOS 111  --   --    ALT 75*  --   --    AST 82*  --   --    BILITOT 0.8  --   --     < > = values in this interval not displayed.       All labs within the past 24 hours have been reviewed.    Significant Imaging:  Labs: Reviewed      Assessment/Plan:     Active Diagnoses:    Diagnosis Date Noted POA    PRINCIPAL PROBLEM:  Cardiac arrest [I46.9] 03/07/2024 Yes    Epilepsy [G40.909] 03/08/2024 Unknown    Anoxic brain injury [G93.1] 03/08/2024 No    Goals of care, counseling/discussion [Z71.89] 01/25/2023 Not Applicable    Acute on chronic combined systolic and diastolic heart failure [I50.43] 12/20/2021 Yes    Myoclonic jerking [G25.3] 12/20/2021 Yes    Pulmonary hypertension [I27.20] 11/28/2018 Yes    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, with long-term current use of insulin [E11.3553, Z79.4] 05/02/2016 Not Applicable    ESRD (end stage renal disease) [N18.6]  Yes       Problems Resolved During this Admission:       Assessment and plan:    1. End-stage renal disease:  She appears to have had a devastating brain injury secondary to anoxia.  Neurology notes and Critical Care notes reviewed.    Palliative Care has been consulted.  At this point likelihood of meaningful recovery is minimal.  Continuing with dialysis would not be indicated.  Awaiting family's final decision regarding palliative care.    2. Electrolytes:  Potassium is stable at 4.2.    3. Acid-base: Serum bicarbonate is stable at 23.      Thank you for your consult.     Adrian Ramirez MD  Nephrology  [facility] ([facility])

## 2024-03-10 NOTE — PLAN OF CARE
Still off sedation. Extension to pain. NSR on monitor. BP stable, art line out. TF started at 10ml/hr, goal 25ml/hr. Pfeiffer intact. Pt turned/repositioned with use of pillows and wedge, heel boots in place. Bed low, wheels locked, alarms audible. POC reviewed. Plan for CT in AM.

## 2024-03-10 NOTE — PLAN OF CARE
Patient is hemodynamically stable.  Still with GCS of 3, minimal withdrawal with suctioning.  Tolerated current vent settings.

## 2024-03-10 NOTE — PLAN OF CARE
Nutrition Recs: 3/10  1. Recommend pt be initiated onto EN when medically appropriate.   - [employer] Renal. Initiate at 10 mL/hr, advance as pt tolerates or per MD/NP. Aim for goal rate of 25 mL/hr.   - Formula at goal rate will provide: 1200 kcal (84% EEN), 54 g Protein (100% EPN), 430.2 mL/day Free water.   - 165 mL fwf q4hr (990 mL/day) or per MD/NP.   - FWF + Free water = 1420.2 (99% EFN).   - Check Mg, Na, K+, Phos, and Glu before and during initiation, correct as indicated.   2. Weigh daily.    Goals:   1. Pt will be initiated onto EN within 24 hrs.   2. Pt will consume and tolerate >50% of EEN/EPN prior to RD follow up  Nutrition Goal Status: new  Communication of RD Recs: other (comment) (POC: Sticky note)  Rian Leonardo MS, Registration Eligible, Provisional LDN

## 2024-03-11 LAB
ALBUMIN SERPL BCP-MCNC: 2.5 G/DL (ref 3.5–5.2)
ALLENS TEST: ABNORMAL
ALP SERPL-CCNC: 208 U/L (ref 55–135)
ALT SERPL W/O P-5'-P-CCNC: 29 U/L (ref 10–44)
ANION GAP SERPL CALC-SCNC: 21 MMOL/L (ref 8–16)
AST SERPL-CCNC: 56 U/L (ref 10–40)
BASOPHILS # BLD AUTO: 0.02 K/UL (ref 0–0.2)
BASOPHILS NFR BLD: 0.2 % (ref 0–1.9)
BILIRUB DIRECT SERPL-MCNC: 0.3 MG/DL (ref 0.1–0.3)
BILIRUB SERPL-MCNC: 0.4 MG/DL (ref 0.1–1)
BUN SERPL-MCNC: 62 MG/DL (ref 8–23)
CALCIUM SERPL-MCNC: 8.3 MG/DL (ref 8.7–10.5)
CHLORIDE SERPL-SCNC: 94 MMOL/L (ref 95–110)
CO2 SERPL-SCNC: 21 MMOL/L (ref 23–29)
CREAT SERPL-MCNC: 7.4 MG/DL (ref 0.5–1.4)
DELSYS: ABNORMAL
DIFFERENTIAL METHOD BLD: ABNORMAL
EOSINOPHIL # BLD AUTO: 0 K/UL (ref 0–0.5)
EOSINOPHIL NFR BLD: 0.2 % (ref 0–8)
ERYTHROCYTE [DISTWIDTH] IN BLOOD BY AUTOMATED COUNT: 14.5 % (ref 11.5–14.5)
ERYTHROCYTE [SEDIMENTATION RATE] IN BLOOD BY WESTERGREN METHOD: 14 MM/H
EST. GFR  (NO RACE VARIABLE): 5 ML/MIN/1.73 M^2
FIO2: 30
GLUCOSE SERPL-MCNC: 132 MG/DL (ref 70–110)
HCO3 UR-SCNC: 23.9 MMOL/L (ref 24–28)
HCT VFR BLD AUTO: 29 % (ref 37–48.5)
HGB BLD-MCNC: 9.7 G/DL (ref 12–16)
IMM GRANULOCYTES # BLD AUTO: 0.05 K/UL (ref 0–0.04)
IMM GRANULOCYTES NFR BLD AUTO: 0.5 % (ref 0–0.5)
LYMPHOCYTES # BLD AUTO: 0.4 K/UL (ref 1–4.8)
LYMPHOCYTES NFR BLD: 3.3 % (ref 18–48)
MAGNESIUM SERPL-MCNC: 2 MG/DL (ref 1.6–2.6)
MCH RBC QN AUTO: 31.5 PG (ref 27–31)
MCHC RBC AUTO-ENTMCNC: 33.4 G/DL (ref 32–36)
MCV RBC AUTO: 94 FL (ref 82–98)
MODE: ABNORMAL
MONOCYTES # BLD AUTO: 0.7 K/UL (ref 0.3–1)
MONOCYTES NFR BLD: 6.2 % (ref 4–15)
NEUTROPHILS # BLD AUTO: 9.8 K/UL (ref 1.8–7.7)
NEUTROPHILS NFR BLD: 89.6 % (ref 38–73)
NRBC BLD-RTO: 0 /100 WBC
PCO2 BLDA: 34.4 MMHG (ref 35–45)
PEEP: 5
PH SMN: 7.45 [PH] (ref 7.35–7.45)
PLATELET # BLD AUTO: 210 K/UL (ref 150–450)
PMV BLD AUTO: 9.6 FL (ref 9.2–12.9)
PO2 BLDA: 146 MMHG (ref 80–100)
POC BE: 0 MMOL/L
POC SATURATED O2: 99 % (ref 95–100)
POCT GLUCOSE: 180 MG/DL (ref 70–110)
POCT GLUCOSE: 199 MG/DL (ref 70–110)
POCT GLUCOSE: 206 MG/DL (ref 70–110)
POTASSIUM SERPL-SCNC: 4.3 MMOL/L (ref 3.5–5.1)
PROT SERPL-MCNC: 5.4 G/DL (ref 6–8.4)
RBC # BLD AUTO: 3.08 M/UL (ref 4–5.4)
SAMPLE: ABNORMAL
SITE: ABNORMAL
SODIUM SERPL-SCNC: 136 MMOL/L (ref 136–145)
VT: 360
WBC # BLD AUTO: 10.95 K/UL (ref 3.9–12.7)

## 2024-03-11 PROCEDURE — 82803 BLOOD GASES ANY COMBINATION: CPT | Mod: HCNC

## 2024-03-11 PROCEDURE — 25000003 PHARM REV CODE 250: Mod: HCNC | Performed by: INTERNAL MEDICINE

## 2024-03-11 PROCEDURE — 99232 SBSQ HOSP IP/OBS MODERATE 35: CPT | Mod: HCNC,,, | Performed by: INTERNAL MEDICINE

## 2024-03-11 PROCEDURE — 63600175 PHARM REV CODE 636 W HCPCS: Mod: HCNC | Performed by: NURSE PRACTITIONER

## 2024-03-11 PROCEDURE — 36600 WITHDRAWAL OF ARTERIAL BLOOD: CPT | Mod: HCNC

## 2024-03-11 PROCEDURE — 25000003 PHARM REV CODE 250: Mod: HCNC | Performed by: NURSE PRACTITIONER

## 2024-03-11 PROCEDURE — 27100171 HC OXYGEN HIGH FLOW UP TO 24 HOURS: Mod: HCNC

## 2024-03-11 PROCEDURE — 85025 COMPLETE CBC W/AUTO DIFF WBC: CPT | Mod: HCNC | Performed by: NURSE PRACTITIONER

## 2024-03-11 PROCEDURE — 99900035 HC TECH TIME PER 15 MIN (STAT): Mod: HCNC

## 2024-03-11 PROCEDURE — 25000003 PHARM REV CODE 250: Mod: HCNC | Performed by: SPECIALIST

## 2024-03-11 PROCEDURE — 20000000 HC ICU ROOM: Mod: HCNC

## 2024-03-11 PROCEDURE — 80076 HEPATIC FUNCTION PANEL: CPT | Mod: HCNC | Performed by: NURSE PRACTITIONER

## 2024-03-11 PROCEDURE — 99900026 HC AIRWAY MAINTENANCE (STAT): Mod: HCNC

## 2024-03-11 PROCEDURE — 63600175 PHARM REV CODE 636 W HCPCS: Mod: HCNC | Performed by: SPECIALIST

## 2024-03-11 PROCEDURE — C9113 INJ PANTOPRAZOLE SODIUM, VIA: HCPCS | Mod: HCNC | Performed by: NURSE PRACTITIONER

## 2024-03-11 PROCEDURE — 83735 ASSAY OF MAGNESIUM: CPT | Mod: HCNC | Performed by: NURSE PRACTITIONER

## 2024-03-11 PROCEDURE — 80048 BASIC METABOLIC PNL TOTAL CA: CPT | Mod: HCNC | Performed by: NURSE PRACTITIONER

## 2024-03-11 PROCEDURE — 95822 EEG COMA OR SLEEP ONLY: CPT | Mod: HCNC

## 2024-03-11 PROCEDURE — 63600175 PHARM REV CODE 636 W HCPCS: Mod: HCNC | Performed by: INTERNAL MEDICINE

## 2024-03-11 RX ORDER — AMIODARONE HYDROCHLORIDE 200 MG/1
200 TABLET ORAL 2 TIMES DAILY
Status: DISCONTINUED | OUTPATIENT
Start: 2024-03-11 | End: 2024-03-16 | Stop reason: HOSPADM

## 2024-03-11 RX ORDER — POLYETHYLENE GLYCOL 3350 17 G/17G
17 POWDER, FOR SOLUTION ORAL DAILY
Status: DISCONTINUED | OUTPATIENT
Start: 2024-03-11 | End: 2024-03-15

## 2024-03-11 RX ORDER — LEVETIRACETAM 10 MG/ML
1000 INJECTION INTRAVASCULAR NIGHTLY
Status: DISCONTINUED | OUTPATIENT
Start: 2024-03-12 | End: 2024-03-16 | Stop reason: HOSPADM

## 2024-03-11 RX ADMIN — MUPIROCIN: 20 OINTMENT TOPICAL at 10:03

## 2024-03-11 RX ADMIN — INSULIN ASPART 4 UNITS: 100 INJECTION, SOLUTION INTRAVENOUS; SUBCUTANEOUS at 05:03

## 2024-03-11 RX ADMIN — MUPIROCIN: 20 OINTMENT TOPICAL at 09:03

## 2024-03-11 RX ADMIN — HEPARIN SODIUM 5000 UNITS: 5000 INJECTION INTRAVENOUS; SUBCUTANEOUS at 02:03

## 2024-03-11 RX ADMIN — AMIODARONE HYDROCHLORIDE 200 MG: 200 TABLET ORAL at 09:03

## 2024-03-11 RX ADMIN — INSULIN ASPART 1 UNITS: 100 INJECTION, SOLUTION INTRAVENOUS; SUBCUTANEOUS at 11:03

## 2024-03-11 RX ADMIN — HEPARIN SODIUM 5000 UNITS: 5000 INJECTION INTRAVENOUS; SUBCUTANEOUS at 05:03

## 2024-03-11 RX ADMIN — CEFTRIAXONE 1 G: 1 INJECTION, POWDER, FOR SOLUTION INTRAMUSCULAR; INTRAVENOUS at 05:03

## 2024-03-11 RX ADMIN — POLYETHYLENE GLYCOL 3350 17 G: 17 POWDER, FOR SOLUTION ORAL at 10:03

## 2024-03-11 RX ADMIN — CHLORHEXIDINE GLUCONATE 0.12% ORAL RINSE 15 ML: 1.2 LIQUID ORAL at 09:03

## 2024-03-11 RX ADMIN — CHLORHEXIDINE GLUCONATE 0.12% ORAL RINSE 15 ML: 1.2 LIQUID ORAL at 10:03

## 2024-03-11 RX ADMIN — PANTOPRAZOLE SODIUM 40 MG: 40 INJECTION, POWDER, FOR SOLUTION INTRAVENOUS at 10:03

## 2024-03-11 RX ADMIN — HEPARIN SODIUM 5000 UNITS: 5000 INJECTION INTRAVENOUS; SUBCUTANEOUS at 09:03

## 2024-03-11 NOTE — ASSESSMENT & PLAN NOTE
- s/p cardiac arrest and suspected prolonged down time   - EEG complete and neuro eval done, continue keppra   - neuro checks, supportive care, avoid fevers   - Repeat CT without acute findings, though limited study  - grave prognosis  - palliative care consulted, ongoing family discussions

## 2024-03-11 NOTE — SUBJECTIVE & OBJECTIVE
Interval History: No acute events.  Vent and hemodynamics stable. Neuro exam unchanged.      CT overnight without acute changes.      Objective:     Vital Signs (Most Recent):  Temp: 98.6 °F (37 °C) (03/11/24 0725)  Pulse: 86 (03/11/24 0725)  Resp: (!) 24 (03/11/24 0725)  BP: (!) 134/58 (03/11/24 0600)  SpO2: 100 % (03/11/24 0725) Vital Signs (24h Range):  Temp:  [98.4 °F (36.9 °C)-100.2 °F (37.9 °C)] 98.6 °F (37 °C)  Pulse:  [75-88] 86  Resp:  [5-28] 24  SpO2:  [92 %-100 %] 100 %  BP: (112-168)/(53-71) 134/58  Arterial Line BP: (113-133)/(29-39) 113/29     Weight: 57.5 kg (126 lb 12.2 oz)  Body mass index is 21.09 kg/m².      Intake/Output Summary (Last 24 hours) at 3/11/2024 0733  Last data filed at 3/11/2024 0530  Gross per 24 hour   Intake 561.96 ml   Output 90 ml   Net 471.96 ml        Physical Exam  Vitals and nursing note reviewed.   Constitutional:       Comments: Intubated, no sedation, unresponsive to voice   Cardiovascular:      Rate and Rhythm: Normal rate and regular rhythm.      Pulses: Normal pulses.      Heart sounds: No murmur heard.  Pulmonary:      Effort: Pulmonary effort is normal. No respiratory distress.      Breath sounds: Rhonchi present. No wheezing or rales.   Abdominal:      General: Abdomen is flat. There is no distension.      Palpations: Abdomen is soft.      Tenderness: There is no abdominal tenderness.   Musculoskeletal:      Right lower leg: No edema.      Left lower leg: No edema.   Neurological:      Comments: Slight posturing with painful peripheral stimulation in the BUE (R>L), no cough/gag, right pupil opaque, left puptil sluggish,  overbreathes the vent           Review of Systems    Vents:  Vent Mode: A/C (03/11/24 0725)  Set Rate: 14 BPM (03/11/24 0725)  Vt Set: 360 mL (03/11/24 0725)  Pressure Support: 7 cmH20 (03/11/24 0725)  PEEP/CPAP: 5 cmH20 (03/11/24 0725)  Oxygen Concentration (%): 30 (03/11/24 0725)  Peak Airway Pressure: 20 cmH20 (03/11/24 0725)  Plateau Pressure:  27 cmH20 (03/11/24 0725)  Total Ve: 8.77 L/m (03/11/24 0725)  Negative Inspiratory Force (cm H2O): 0 (03/11/24 0725)  F/VT Ratio<105 (RSBI): (!) 65.75 (03/11/24 0725)    Lines/Drains/Airways       Central Venous Catheter Line  Duration             Percutaneous Central Line - Triple Lumen  03/07/24 1347 Internal Jugular Right 3 days              Drain  Duration                  NG/OG Tube 03/07/24 1847 Center mouth 3 days         Urethral Catheter 03/07/24 1847 3 days              Airway  Duration                  Airway - Non-Surgical 03/07/24 1259 3 days         Airway - Non-Surgical 03/07/24 1300 Endotracheal Tube 3 days              Peripheral Intravenous Line  Duration                  Hemodialysis AV Fistula 04/01/20 2203 Left upper arm 1439 days                    Significant Labs:    CBC/Anemia Profile:  Recent Labs   Lab 03/10/24  0406 03/11/24  0433   WBC 11.09 10.95   HGB 9.3* 9.7*   HCT 27.9* 29.0*    210   MCV 95 94   RDW 14.4 14.5        Chemistries:  Recent Labs   Lab 03/10/24  0406 03/11/24  0433    136   K 4.2 4.3   CL 95 94*   CO2 23 21*   BUN 49* 62*   CREATININE 6.3* 7.4*   CALCIUM 8.3* 8.3*   MG 1.8 2.0       All pertinent labs within the past 24 hours have been reviewed.    Significant Imaging:  I have reviewed all pertinent imaging results/findings within the past 24 hours.

## 2024-03-11 NOTE — PROGRESS NOTES
Care assumed. Chart and labs reviewed,. POC with ICU team. No changes in plans. Cont to monitor. Labs and vent .

## 2024-03-11 NOTE — PLAN OF CARE
POC with ICU team. Cont with vent settings. Neuro checks. Skin integrity maintained. Safety maintained.

## 2024-03-11 NOTE — CARE UPDATE
"Advance Care Planning   [facility] - Intensive Care ([facility])  Palliative Care RN      Patient Name: Avril Monzon  MRN: [id number]  Admission Date: 3/7/2024  Hospital Length of Stay: 4 days  Code Status: Full Code   Attending Provider: Sebastien Olivo MD  Palliative Care Provider: JACK Johansen  Primary Care Physician: Rose Germain MD  Principal Problem:Cardiac arrest    Contacted daughter/HCPJOSE Johnson to arrange family meeting for this week. Elizabeth shared that she is currently in the hospital with her dad/patient's  who has palliative care through his oncologist Dr. Irving for stomach cancer. Patient's  scheduled for scope today and Elizabeth hopes to come between [id number] today to for medical updates on her mom. Elizabeth shared she is also helping care for her 2-year-old grandson while his mom started a new job. I acknowledged this all must be a lot to handle and she stated she has "given it to God" knowing these things are out of her control. Elizabeth is hopeful that her mom will make a recovery and feels that she responded by moving her toes and arm. She would like to give her more time given her age and to allow for medications to not be a factor in her neuro response. She is open with meeting with the Palliative Medicine team either tomorrow or Wednesday and will call and let me know a time and day. Updated attending MD Dr. Olivo and bedside RN Lisa.       Ted Figueroa RN-[facility], Palliative Medicine   [phone]  "

## 2024-03-11 NOTE — CONSULTS
[facility] ([facility])  Nephrology  Consult Note    Patient Name: Avril Monzon  MRN: [id number]  Admission Date: 3/7/2024  Hospital Length of Stay: 4 days  Attending Provider: Sebastien Olivo MD   Primary Care Physician: Rose Germain MD  Principal Problem:Cardiac arrest    Consults  Subjective:     HPI:  79-year-old female with history of end-stage renal disease.  Admitted after having cardiac arrest during a transesophageal echocardiogram.  Nephrology has been consulted for maintenance of dialysis and end-stage renal disease related issues.  Patient was seen in the intensive care unit.  In bed resting comfortably.  Intubated on multiple drips.  There is a family member at the bedside.  All nephrology related questions were answered to her satisfaction.    03/09/2024:  Patient was seen in the intensive care unit.  In bed resting comfortably.  Remains intubated.  Events from overnight noted.    03/10/2024:  Patient was seen in the intensive care unit.  Remains intubated.  Sedation was discontinued.  Documented GCS 3.  Felt to have severe anoxic brain injury.    03/11/2024:  Patient was seen in the intensive care unit.  Remains intubated.  No change in neuro exam.    Past Medical History:   Diagnosis Date    Abnormal nuclear stress test 02/12/2024    Acute hypoxemic respiratory failure 11/26/2018    Anemia     Arthritis     back, hand, knees    Back pain     Cataract     CKD stage G4/A3, GFR 15 - 29 and albumin creatinine ratio >300 mg/g     GFR 40% Jan 2014 and 33% ub 3/2014 (BRG)    Coronary artery disease involving native coronary artery of native heart 11/30/2018    Diabetic retinopathy     DM (diabetes mellitus) type II controlled, neurological manifestation     Exudative age-related macular degeneration of left eye with active choroidal neovascularization 02/05/2019    GERD (gastroesophageal reflux disease)     Glaucoma     Heart failure     Hyperlipidemia     Hypertension     Non-ST  elevation MI (NSTEMI) 11/28/2018    NSTEMI (non-ST elevated myocardial infarction) 11/27/2018    Peripheral neuropathy     Polyneuropathy     Proteinuria     >6 mo     Seizures     BRG 1/2014 -dick CT NRI showed small vessel    Skin ulcer of toe of right foot with fat layer exposed 10/14/2022    Stroke 2012,2014    Tobacco dependence     Type 2 diabetes with peripheral circulatory disorder, controlled        Past Surgical History:   Procedure Laterality Date    ANGIOGRAM, LOWER ARTERIAL, UNILATERAL  2/13/2024    Procedure: Angiogram, Lower Arterial, Unilateral;  Surgeon: Michelle Holman MD;  Location: [facility] CATH LAB;  Service: Cardiology;;    ARTERIOGRAPHY OF SUBCLAVIAN ARTERY  2/13/2024    Procedure: ARTERIOGRAM, SUBCLAVIAN;  Surgeon: Michelle Holman MD;  Location: [facility] CATH LAB;  Service: Cardiology;;    BREAST LUMPECTOMY Left     benign, when patient was 15 years old    BREAST MASS EXCISION      ,benign, age 15.    CATHETERIZATION OF BOTH LEFT AND RIGHT HEART N/A 11/28/2018    Procedure: CATHETERIZATION, HEART, BOTH LEFT AND RIGHT;  Surgeon: Michelle Holman MD;  Location: [facility] CATH LAB;  Service: Cardiology;  Laterality: N/A;    CATHETERIZATION OF BOTH LEFT AND RIGHT HEART N/A 11/30/2018    Procedure: CATHETERIZATION, HEART, BOTH LEFT AND RIGHT;  Surgeon: Michelle Holman MD;  Location: [facility] CATH LAB;  Service: Cardiology;  Laterality: N/A;    CORONARY ARTERY BYPASS GRAFT      CORONARY ARTERY BYPASS GRAFT (CABG) N/A 7/20/2022    Procedure: CORONARY ARTERY BYPASS GRAFT (CABG);  Surgeon: Sanju Locke MD;  Location: [facility] OR;  Service: Cardiothoracic;  Laterality: N/A;  2-VESSEL PUMP ASSIST WITH EPI-AORTIC ULTRASOUND    CORONARY BYPASS GRAFT ANGIOGRAPHY  2/13/2024    Procedure: Bypass graft study;  Surgeon: Michelle Holman MD;  Location: [facility] CATH LAB;  Service: Cardiology;;    ENDOSCOPIC HARVEST OF VEIN Left 7/20/2022    Procedure: SURGICAL PROCUREMENT, VEIN, ENDOSCOPIC;  Surgeon: Sanju Locke MD;   Location: [facility] OR;  Service: Cardiothoracic;  Laterality: Left;    HYSTERECTOMY  1982    INJECTION OF ANESTHETIC AGENT AROUND MULTIPLE INTERCOSTAL NERVES N/A 7/20/2022    Procedure: BLOCK, NERVE, INTERCOSTAL, 2 OR MORE;  Surgeon: Sanju Locke MD;  Location: [facility] OR;  Service: Cardiothoracic;  Laterality: N/A;  PARASTERNAL NERVE BLOCK    INSERTION OF SHUNT      LEFT HEART CATHETERIZATION Left 12/3/2018    Procedure: CATHETERIZATION, HEART, LEFT;  Surgeon: Michelle Holman MD;  Location: [facility] CATH LAB;  Service: Cardiology;  Laterality: Left;  LAD ATHERECTOMY STENT/ FEMORAL APPROACH    LEFT HEART CATHETERIZATION Left 7/13/2022    Procedure: CATHETERIZATION, HEART, LEFT;  Surgeon: Abhi Sloan MD;  Location: [facility] CATH LAB;  Service: Cardiology;  Laterality: Left;    LEFT HEART CATHETERIZATION Left 2/13/2024    Procedure: Left heart cath;  Surgeon: Michelle Holman MD;  Location: [facility] CATH LAB;  Service: Cardiology;  Laterality: Left;    OOPHORECTOMY      RIGHT HEART CATHETERIZATION Right 2/13/2024    Procedure: INSERTION, CATHETER, RIGHT HEART;  Surgeon: Michelle Holman MD;  Location: [facility] CATH LAB;  Service: Cardiology;  Laterality: Right;    TRANSESOPHAGEAL ECHOCARDIOGRAPHY N/A 3/7/2024    Procedure: ECHOCARDIOGRAM, TRANSESOPHAGEAL;  Surgeon: Colton Abreu MD;  Location: [facility] CATH LAB;  Service: Cardiology;  Laterality: N/A;    wrist cyst Right 1980s    dorsal wrist cyst       Review of patient's allergies indicates:   Allergen Reactions    Norvasc [amlodipine] Shortness Of Breath    Codeine Swelling     Other Reaction(s): Itchy skin eruption ([id number],SNOMED-CT)    Propoxyphene Swelling     Other Reaction(s): Itchy skin eruption (code-[id number],SNOMED-CT)    Atorvastatin      Muscle twitching     Current Facility-Administered Medications   Medication Frequency    acetaminophen oral solution 650 mg Q6H PRN    amiodarone tablet 200 mg BID    cefTRIAXone (Rocephin) 1 g in dextrose 5 % in water (D5W) 100  mL IVPB (MB+) Q24H    chlorhexidine 0.12 % solution 15 mL BID    dextrose 10% bolus 125 mL 125 mL PRN    dextrose 10% bolus 250 mL 250 mL PRN    glucagon (human recombinant) injection 1 mg PRN    heparin (porcine) injection 5,000 Units Q8H    insulin aspart U-100 pen 0-10 Units Q6H PRN    [START ON 3/12/2024] levETIRAcetam in NaCl (iso-os) IVPB 1,000 mg QHS    mupirocin 2 % ointment BID    pantoprazole injection 40 mg Daily    polyethylene glycol packet 17 g Daily    sodium chloride 0.9% bolus 250 mL 250 mL PRN    sodium chloride 0.9% flush 10 mL PRN     Family History       Problem Relation (Age of Onset)    Cancer Maternal Grandmother    Diabetes Mother    Heart disease Mother    Hyperlipidemia Mother    Hypertension Mother    Muscular dystrophy Son          Tobacco Use    Smoking status: Former     Current packs/day: 0.00     Average packs/day: 1.5 packs/day for 30.0 years (45.0 ttl pk-yrs)     Types: Cigarettes     Start date: 1960     Quit date: 1990     Years since quittin.1    Smokeless tobacco: Former   Substance and Sexual Activity    Alcohol use: No     Alcohol/week: 0.0 standard drinks of alcohol    Drug use: No    Sexual activity: Not Currently     Review of Systems   Reason unable to perform ROS: Patient is intubated and sedated.     Objective:     Vital Signs (Most Recent):  Temp: 98.6 °F (37 °C) (24)  Pulse: 75 (24)  Resp: 15 (24)  BP: (!) 116/56 (24)  SpO2: 100 % (24) Vital Signs (24h Range):  Temp:  [98.4 °F (36.9 °C)-100.2 °F (37.9 °C)] 98.6 °F (37 °C)  Pulse:  [75-88] 75  Resp:  [5-28] 15  SpO2:  [92 %-100 %] 100 %  BP: (116-168)/(56-71) 116/56     Weight: 57.5 kg (126 lb 12.2 oz) (03/10/24 0907)  Body mass index is 21.09 kg/m².  Body surface area is 1.62 meters squared.    I/O last 3 completed shifts:  In: 860.1 [I.V.:273.4; NG/GT:195; IV Piggyback:391.7]  Out: 125 [Urine:125]    Physical Exam  Constitutional:       General: She  is not in acute distress.     Appearance: She is ill-appearing.   HENT:      Head: Normocephalic and atraumatic.   Eyes:      General: No scleral icterus.     Extraocular Movements: Extraocular movements intact.      Pupils: Pupils are equal, round, and reactive to light.   Cardiovascular:      Rate and Rhythm: Regular rhythm. Tachycardia present.   Pulmonary:      Effort: Pulmonary effort is normal.      Breath sounds: No stridor.   Musculoskeletal:      Right lower leg: No edema.      Left lower leg: No edema.   Skin:     General: Skin is warm and dry.   Neurological:      Comments: Can not be assessed.   Psychiatric:      Comments: Can not be assessed.         Significant Labs:  BMP:   Recent Labs   Lab 03/11/24  0433   *      K 4.3   CL 94*   CO2 21*   BUN 62*   CREATININE 7.4*   CALCIUM 8.3*   MG 2.0       CMP:   Recent Labs   Lab 03/11/24 0433   *   CALCIUM 8.3*   ALBUMIN 2.5*   PROT 5.4*      K 4.3   CO2 21*   CL 94*   BUN 62*   CREATININE 7.4*   ALKPHOS 208*   ALT 29   AST 56*   BILITOT 0.4       All labs within the past 24 hours have been reviewed.    Significant Imaging:  Labs: Reviewed      Assessment/Plan:     Active Diagnoses:    Diagnosis Date Noted POA    PRINCIPAL PROBLEM:  Cardiac arrest [I46.9] 03/07/2024 Yes    Epilepsy [G40.909] 03/08/2024 Unknown    Anoxic brain injury [G93.1] 03/08/2024 No    Goals of care, counseling/discussion [Z71.89] 01/25/2023 Not Applicable    Acute on chronic combined systolic and diastolic heart failure [I50.43] 12/20/2021 Yes    Myoclonic jerking [G25.3] 12/20/2021 Yes    Pulmonary hypertension [I27.20] 11/28/2018 Yes    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, with long-term current use of insulin [E11.3553, Z79.4] 05/02/2016 Not Applicable    ESRD (end stage renal disease) [N18.6]  Yes      Problems Resolved During this Admission:       Assessment and plan:    1. End-stage renal disease:  She appears to  have had a devastating brain injury secondary to anoxia.  Neurology notes and Critical Care notes reviewed.    Palliative Care has been consulted.  At this point likelihood of meaningful recovery is minimal.  Continuing with dialysis would not be indicated.      Currently there are no hard indications for dialysis.  Palliative Care has been consulted.  Awaiting the family's decision.  If no decision is made within the next few days we will be forced have to continue dialysis.    2. Electrolytes:  Potassium is stable at 4.3    3. Acid-base: Serum bicarbonate is stable at 21.      Thank you for your consult.     Adrian Ramirez MD  Nephrology  [facility] ([facility])

## 2024-03-11 NOTE — PROGRESS NOTES
[facility] ([facility])  Critical Care Medicine  Progress Note    Patient Name: Avril Monzon  MRN: [id number]  Admission Date: 3/7/2024  Hospital Length of Stay: 4 days  Code Status: Full Code  Attending Provider: Zoila Valadez MD  Primary Care Provider: Rose Germain MD   Principal Problem: Cardiac arrest    Subjective:     HPI:  79yr old with CMP ef 45%, Severe pulmonary HTN PAP > 85, Valvular heart disease, DM, ESRD, CVA, Retinopathy was getting outpatient EUNICE.  She was given diprivan for the procedure and post procedure remained unresponsive, then during EKG went from tachycardia to asystole.   She was given one roud of Epi, several boluses of vasopressors by anesthesia and was resuscitated for a few minutes. Intubated and bough to ICU.   In ICU CVL was placed and she was placed on levo. She had two more VT / VF episodes requiring CPR, Amiodarone bolus was given in the previous arrest and amiodarone infusion was initiated.   An A line was placed and CT head and other orders were placed. Family was updated by Cardiology. She was also given Ca and bicarbonate times 2 during resuscitation.     Hospital/ICU Course:  3/8. ABG reviewed. Settings changed. Myoclonic activity. Suspect a longer arrest duration in recovery. Tele Neuro consult, Cont EEG and other test pending.  3/9: remains neurologically devastated on the vent, family request meeting at 1600 today, continue supportive care   3/10: no meaningful recovery from a neuro standpoint, remains off sedation on the vent, supportive care   3/11: neuro exam unchanged.  Vent and hemodynamics are stable.    Interval History: No acute events.  Vent and hemodynamics stable. Neuro exam unchanged.      CT overnight without acute changes.      Objective:     Vital Signs (Most Recent):  Temp: 98.6 °F (37 °C) (03/11/24 0725)  Pulse: 86 (03/11/24 0725)  Resp: (!) 24 (03/11/24 0725)  BP: (!) 134/58 (03/11/24 0600)  SpO2: 100 % (03/11/24 0725) Vital Signs  (24h Range):  Temp:  [98.4 °F (36.9 °C)-100.2 °F (37.9 °C)] 98.6 °F (37 °C)  Pulse:  [75-88] 86  Resp:  [5-28] 24  SpO2:  [92 %-100 %] 100 %  BP: (112-168)/(53-71) 134/58  Arterial Line BP: (113-133)/(29-39) 113/29     Weight: 57.5 kg (126 lb 12.2 oz)  Body mass index is 21.09 kg/m².      Intake/Output Summary (Last 24 hours) at 3/11/2024 0733  Last data filed at 3/11/2024 0530  Gross per 24 hour   Intake 561.96 ml   Output 90 ml   Net 471.96 ml        Physical Exam  Vitals and nursing note reviewed.   Constitutional:       Comments: Intubated, no sedation, unresponsive to voice   Cardiovascular:      Rate and Rhythm: Normal rate and regular rhythm.      Pulses: Normal pulses.      Heart sounds: No murmur heard.  Pulmonary:      Effort: Pulmonary effort is normal. No respiratory distress.      Breath sounds: Rhonchi present. No wheezing or rales.   Abdominal:      General: Abdomen is flat. There is no distension.      Palpations: Abdomen is soft.      Tenderness: There is no abdominal tenderness.   Musculoskeletal:      Right lower leg: No edema.      Left lower leg: No edema.   Neurological:      Comments: Slight posturing with painful peripheral stimulation in the BUE (R>L), no cough/gag, right pupil opaque, left puptil sluggish,  overbreathes the vent           Review of Systems    Vents:  Vent Mode: A/C (03/11/24 0725)  Set Rate: 14 BPM (03/11/24 0725)  Vt Set: 360 mL (03/11/24 0725)  Pressure Support: 7 cmH20 (03/11/24 0725)  PEEP/CPAP: 5 cmH20 (03/11/24 0725)  Oxygen Concentration (%): 30 (03/11/24 0725)  Peak Airway Pressure: 20 cmH20 (03/11/24 0725)  Plateau Pressure: 27 cmH20 (03/11/24 0725)  Total Ve: 8.77 L/m (03/11/24 0725)  Negative Inspiratory Force (cm H2O): 0 (03/11/24 0725)  F/VT Ratio<105 (RSBI): (!) 65.75 (03/11/24 0725)    Lines/Drains/Airways       Central Venous Catheter Line  Duration             Percutaneous Central Line - Triple Lumen  03/07/24 1347 Internal Jugular Right 3 days               Drain  Duration                  NG/OG Tube 03/07/24 1847 Center mouth 3 days         Urethral Catheter 03/07/24 1847 3 days              Airway  Duration                  Airway - Non-Surgical 03/07/24 1259 3 days         Airway - Non-Surgical 03/07/24 1300 Endotracheal Tube 3 days              Peripheral Intravenous Line  Duration                  Hemodialysis AV Fistula 04/01/20 2203 Left upper arm 1439 days                    Significant Labs:    CBC/Anemia Profile:  Recent Labs   Lab 03/10/24  0406 03/11/24  0433   WBC 11.09 10.95   HGB 9.3* 9.7*   HCT 27.9* 29.0*    210   MCV 95 94   RDW 14.4 14.5        Chemistries:  Recent Labs   Lab 03/10/24  0406 03/11/24  0433    136   K 4.2 4.3   CL 95 94*   CO2 23 21*   BUN 49* 62*   CREATININE 6.3* 7.4*   CALCIUM 8.3* 8.3*   MG 1.8 2.0       All pertinent labs within the past 24 hours have been reviewed.    Significant Imaging:  I have reviewed all pertinent imaging results/findings within the past 24 hours.    ABG  Recent Labs   Lab 03/11/24  0256   PH 7.449   PO2 146*   PCO2 34.4*   HCO3 23.9*   BE 0     Assessment/Plan:     Neuro  Anoxic brain injury  - s/p cardiac arrest and suspected prolonged down time   - EEG complete and neuro eval done, continue keppra   - neuro checks, supportive care, avoid fevers   - Repeat CT without acute findings, though limited study  - grave prognosis  - palliative care consulted, ongoing family discussions     Myoclonic jerking  - see plan for anoxic injury    Cardiac/Vascular  * Cardiac arrest  - cardiology following  - supportive care  - avoid fevers   - see plan for anoxia     Acute on chronic combined systolic and diastolic heart failure  - per cards  - monitor I&Os    Pulmonary hypertension  - severe PAP 88mmHg    Renal/  ESRD (end stage renal disease)  - nephro following with RRT per their recs   - no acute indications currently    Endocrine  Type 2 diabetes mellitus with stable proliferative retinopathy of both  eyes, with long-term current use of insulin  - SSI/Accuchecks    Palliative Care  Goals of care, counseling/discussion  - continue discussions and provide support      Critical Care Time: 38 minutes  Critical secondary to respiratory failure, anoxic brain injury      Critical care was time spent personally by me on the following activities: development of treatment plan with patient or surrogate and bedside caregivers, discussions with consultants, evaluation of patient's response to treatment, examination of patient, ordering and performing treatments and interventions, ordering and review of laboratory studies, ordering and review of radiographic studies, pulse oximetry, re-evaluation of patient's condition. This critical care time did not overlap with that of any other provider or involve time for any procedures.     Sebastien Olivo MD  Critical Care Medicine  [facility] - Intensive [facility])

## 2024-03-12 LAB
ALLENS TEST: ABNORMAL
ANION GAP SERPL CALC-SCNC: 24 MMOL/L (ref 8–16)
BASOPHILS # BLD AUTO: 0.02 K/UL (ref 0–0.2)
BASOPHILS NFR BLD: 0.2 % (ref 0–1.9)
BUN SERPL-MCNC: 78 MG/DL (ref 8–23)
CALCIUM SERPL-MCNC: 9.2 MG/DL (ref 8.7–10.5)
CHLORIDE SERPL-SCNC: 92 MMOL/L (ref 95–110)
CO2 SERPL-SCNC: 20 MMOL/L (ref 23–29)
CREAT SERPL-MCNC: 8 MG/DL (ref 0.5–1.4)
DELSYS: ABNORMAL
DIFFERENTIAL METHOD BLD: ABNORMAL
EOSINOPHIL # BLD AUTO: 0 K/UL (ref 0–0.5)
EOSINOPHIL NFR BLD: 0.4 % (ref 0–8)
ERYTHROCYTE [DISTWIDTH] IN BLOOD BY AUTOMATED COUNT: 14.5 % (ref 11.5–14.5)
ERYTHROCYTE [SEDIMENTATION RATE] IN BLOOD BY WESTERGREN METHOD: 14 MM/H
EST. GFR  (NO RACE VARIABLE): 5 ML/MIN/1.73 M^2
FIO2: 30
GLUCOSE SERPL-MCNC: 206 MG/DL (ref 70–110)
HCO3 UR-SCNC: 23.9 MMOL/L (ref 24–28)
HCT VFR BLD AUTO: 26.9 % (ref 37–48.5)
HGB BLD-MCNC: 9.2 G/DL (ref 12–16)
IMM GRANULOCYTES # BLD AUTO: 0.05 K/UL (ref 0–0.04)
IMM GRANULOCYTES NFR BLD AUTO: 0.6 % (ref 0–0.5)
LYMPHOCYTES # BLD AUTO: 0.3 K/UL (ref 1–4.8)
LYMPHOCYTES NFR BLD: 3.9 % (ref 18–48)
MAGNESIUM SERPL-MCNC: 2.2 MG/DL (ref 1.6–2.6)
MCH RBC QN AUTO: 32.2 PG (ref 27–31)
MCHC RBC AUTO-ENTMCNC: 34.2 G/DL (ref 32–36)
MCV RBC AUTO: 94 FL (ref 82–98)
MODE: ABNORMAL
MONOCYTES # BLD AUTO: 0.5 K/UL (ref 0.3–1)
MONOCYTES NFR BLD: 6.4 % (ref 4–15)
NEUTROPHILS # BLD AUTO: 7.5 K/UL (ref 1.8–7.7)
NEUTROPHILS NFR BLD: 88.5 % (ref 38–73)
NRBC BLD-RTO: 0 /100 WBC
PCO2 BLDA: 34.7 MMHG (ref 35–45)
PEEP: 5
PH SMN: 7.45 [PH] (ref 7.35–7.45)
PLATELET # BLD AUTO: 191 K/UL (ref 150–450)
PMV BLD AUTO: 9.6 FL (ref 9.2–12.9)
PO2 BLDA: 156 MMHG (ref 80–100)
POC BE: 0 MMOL/L
POC SATURATED O2: 99 % (ref 95–100)
POCT GLUCOSE: 208 MG/DL (ref 70–110)
POCT GLUCOSE: 225 MG/DL (ref 70–110)
POCT GLUCOSE: 232 MG/DL (ref 70–110)
POCT GLUCOSE: 267 MG/DL (ref 70–110)
POTASSIUM SERPL-SCNC: 3.8 MMOL/L (ref 3.5–5.1)
RBC # BLD AUTO: 2.86 M/UL (ref 4–5.4)
SAMPLE: ABNORMAL
SITE: ABNORMAL
SODIUM SERPL-SCNC: 136 MMOL/L (ref 136–145)
VT: 450
WBC # BLD AUTO: 8.5 K/UL (ref 3.9–12.7)

## 2024-03-12 PROCEDURE — 82803 BLOOD GASES ANY COMBINATION: CPT | Mod: HCNC

## 2024-03-12 PROCEDURE — C9113 INJ PANTOPRAZOLE SODIUM, VIA: HCPCS | Mod: HCNC | Performed by: NURSE PRACTITIONER

## 2024-03-12 PROCEDURE — 99900035 HC TECH TIME PER 15 MIN (STAT): Mod: HCNC

## 2024-03-12 PROCEDURE — 94003 VENT MGMT INPAT SUBQ DAY: CPT | Mod: HCNC

## 2024-03-12 PROCEDURE — 25000003 PHARM REV CODE 250: Mod: HCNC | Performed by: INTERNAL MEDICINE

## 2024-03-12 PROCEDURE — 83735 ASSAY OF MAGNESIUM: CPT | Mod: HCNC | Performed by: NURSE PRACTITIONER

## 2024-03-12 PROCEDURE — 36600 WITHDRAWAL OF ARTERIAL BLOOD: CPT | Mod: HCNC

## 2024-03-12 PROCEDURE — 99900026 HC AIRWAY MAINTENANCE (STAT): Mod: HCNC

## 2024-03-12 PROCEDURE — 5A1D70Z PERFORMANCE OF URINARY FILTRATION, INTERMITTENT, LESS THAN 6 HOURS PER DAY: ICD-10-PCS | Performed by: INTERNAL MEDICINE

## 2024-03-12 PROCEDURE — 80048 BASIC METABOLIC PNL TOTAL CA: CPT | Mod: HCNC | Performed by: NURSE PRACTITIONER

## 2024-03-12 PROCEDURE — 99232 SBSQ HOSP IP/OBS MODERATE 35: CPT | Mod: HCNC,,, | Performed by: INTERNAL MEDICINE

## 2024-03-12 PROCEDURE — 63600175 PHARM REV CODE 636 W HCPCS: Mod: HCNC | Performed by: NURSE PRACTITIONER

## 2024-03-12 PROCEDURE — 20000000 HC ICU ROOM: Mod: HCNC

## 2024-03-12 PROCEDURE — 94761 N-INVAS EAR/PLS OXIMETRY MLT: CPT | Mod: HCNC,XB

## 2024-03-12 PROCEDURE — 63600175 PHARM REV CODE 636 W HCPCS: Mod: HCNC | Performed by: SPECIALIST

## 2024-03-12 PROCEDURE — 27100171 HC OXYGEN HIGH FLOW UP TO 24 HOURS: Mod: HCNC

## 2024-03-12 PROCEDURE — 25000003 PHARM REV CODE 250: Mod: HCNC | Performed by: NURSE PRACTITIONER

## 2024-03-12 PROCEDURE — 25000003 PHARM REV CODE 250: Mod: HCNC | Performed by: SPECIALIST

## 2024-03-12 PROCEDURE — 80100014 HC HEMODIALYSIS 1:1: Mod: HCNC

## 2024-03-12 PROCEDURE — 85025 COMPLETE CBC W/AUTO DIFF WBC: CPT | Mod: HCNC | Performed by: NURSE PRACTITIONER

## 2024-03-12 PROCEDURE — 63600175 PHARM REV CODE 636 W HCPCS: Mod: HCNC | Performed by: INTERNAL MEDICINE

## 2024-03-12 RX ORDER — FENTANYL CITRATE 50 UG/ML
25 INJECTION, SOLUTION INTRAMUSCULAR; INTRAVENOUS
Status: DISCONTINUED | OUTPATIENT
Start: 2024-03-12 | End: 2024-03-16 | Stop reason: HOSPADM

## 2024-03-12 RX ORDER — GLUCAGON 1 MG
1 KIT INJECTION
Status: DISCONTINUED | OUTPATIENT
Start: 2024-03-12 | End: 2024-03-15

## 2024-03-12 RX ORDER — FENTANYL CITRATE 50 UG/ML
25 INJECTION, SOLUTION INTRAMUSCULAR; INTRAVENOUS ONCE
Status: DISCONTINUED | OUTPATIENT
Start: 2024-03-12 | End: 2024-03-14 | Stop reason: SDUPTHER

## 2024-03-12 RX ORDER — INSULIN ASPART 100 [IU]/ML
0-10 INJECTION, SOLUTION INTRAVENOUS; SUBCUTANEOUS EVERY 4 HOURS PRN
Status: DISCONTINUED | OUTPATIENT
Start: 2024-03-12 | End: 2024-03-15

## 2024-03-12 RX ADMIN — LEVETIRACETAM INJECTION 1000 MG: 10 INJECTION INTRAVENOUS at 09:03

## 2024-03-12 RX ADMIN — INSULIN ASPART 4 UNITS: 100 INJECTION, SOLUTION INTRAVENOUS; SUBCUTANEOUS at 04:03

## 2024-03-12 RX ADMIN — HEPARIN SODIUM 5000 UNITS: 5000 INJECTION INTRAVENOUS; SUBCUTANEOUS at 05:03

## 2024-03-12 RX ADMIN — CHLORHEXIDINE GLUCONATE 0.12% ORAL RINSE 15 ML: 1.2 LIQUID ORAL at 08:03

## 2024-03-12 RX ADMIN — INSULIN ASPART 2 UNITS: 100 INJECTION, SOLUTION INTRAVENOUS; SUBCUTANEOUS at 08:03

## 2024-03-12 RX ADMIN — MUPIROCIN: 20 OINTMENT TOPICAL at 08:03

## 2024-03-12 RX ADMIN — HEPARIN SODIUM 5000 UNITS: 5000 INJECTION INTRAVENOUS; SUBCUTANEOUS at 02:03

## 2024-03-12 RX ADMIN — INSULIN ASPART 6 UNITS: 100 INJECTION, SOLUTION INTRAVENOUS; SUBCUTANEOUS at 12:03

## 2024-03-12 RX ADMIN — POLYETHYLENE GLYCOL 3350 17 G: 17 POWDER, FOR SOLUTION ORAL at 08:03

## 2024-03-12 RX ADMIN — PANTOPRAZOLE SODIUM 40 MG: 40 INJECTION, POWDER, FOR SOLUTION INTRAVENOUS at 08:03

## 2024-03-12 RX ADMIN — HEPARIN SODIUM 5000 UNITS: 5000 INJECTION INTRAVENOUS; SUBCUTANEOUS at 09:03

## 2024-03-12 RX ADMIN — AMIODARONE HYDROCHLORIDE 200 MG: 200 TABLET ORAL at 08:03

## 2024-03-12 RX ADMIN — INSULIN ASPART 4 UNITS: 100 INJECTION, SOLUTION INTRAVENOUS; SUBCUTANEOUS at 05:03

## 2024-03-12 NOTE — CONSULTS
[facility] ([facility])  Nephrology  Consult Note    Patient Name: Avril Monzon  MRN: [id number]  Admission Date: 3/7/2024  Hospital Length of Stay: 5 days  Attending Provider: Sebastien Olivo MD   Primary Care Physician: Rose Germain MD  Principal Problem:Cardiac arrest    Consults  Subjective:     HPI:  79-year-old female with history of end-stage renal disease.  Admitted after having cardiac arrest during a transesophageal echocardiogram.  Nephrology has been consulted for maintenance of dialysis and end-stage renal disease related issues.  Patient was seen in the intensive care unit.  In bed resting comfortably.  Intubated on multiple drips.  There is a family member at the bedside.  All nephrology related questions were answered to her satisfaction.    03/09/2024:  Patient was seen in the intensive care unit.  In bed resting comfortably.  Remains intubated.  Events from overnight noted.    03/10/2024:  Patient was seen in the intensive care unit.  Remains intubated.  Sedation was discontinued.  Documented GCS 3.  Felt to have severe anoxic brain injury.    03/11/2024:  Patient was seen in the intensive care unit.  Remains intubated.  No change in neuro exam.    03/12/2024: Patient was seen in the intensive care unit.  Remains intubated.  No improvement in neurological exam.    Past Medical History:   Diagnosis Date    Abnormal nuclear stress test 02/12/2024    Acute hypoxemic respiratory failure 11/26/2018    Anemia     Arthritis     back, hand, knees    Back pain     Cataract     CKD stage G4/A3, GFR 15 - 29 and albumin creatinine ratio >300 mg/g     GFR 40% Jan 2014 and 33% ub 3/2014 ([facility])    Coronary artery disease involving native coronary artery of native heart 11/30/2018    Diabetic retinopathy     DM (diabetes mellitus) type II controlled, neurological manifestation     Exudative age-related macular degeneration of left eye with active choroidal neovascularization 02/05/2019    GERD  (gastroesophageal reflux disease)     Glaucoma     Heart failure     Hyperlipidemia     Hypertension     Non-ST elevation MI (NSTEMI) 11/28/2018    NSTEMI (non-ST elevated myocardial infarction) 11/27/2018    Peripheral neuropathy     Polyneuropathy     Proteinuria     >6 mo     Seizures     BRG 1/2014 -dick CT NRI showed small vessel    Skin ulcer of toe of right foot with fat layer exposed 10/14/2022    Stroke 2012,2014    Tobacco dependence     Type 2 diabetes with peripheral circulatory disorder, controlled        Past Surgical History:   Procedure Laterality Date    ANGIOGRAM, LOWER ARTERIAL, UNILATERAL  2/13/2024    Procedure: Angiogram, Lower Arterial, Unilateral;  Surgeon: Michelle Holman MD;  Location: [facility] CATH LAB;  Service: Cardiology;;    ARTERIOGRAPHY OF SUBCLAVIAN ARTERY  2/13/2024    Procedure: ARTERIOGRAM, SUBCLAVIAN;  Surgeon: Michelle Holman MD;  Location: [facility] CATH LAB;  Service: Cardiology;;    BREAST LUMPECTOMY Left     benign, when patient was 15 years old    BREAST MASS EXCISION      ,benign, age 15.    CATHETERIZATION OF BOTH LEFT AND RIGHT HEART N/A 11/28/2018    Procedure: CATHETERIZATION, HEART, BOTH LEFT AND RIGHT;  Surgeon: Michelle Holman MD;  Location: [facility] CATH LAB;  Service: Cardiology;  Laterality: N/A;    CATHETERIZATION OF BOTH LEFT AND RIGHT HEART N/A 11/30/2018    Procedure: CATHETERIZATION, HEART, BOTH LEFT AND RIGHT;  Surgeon: Michelle Holman MD;  Location: [facility] CATH LAB;  Service: Cardiology;  Laterality: N/A;    CORONARY ARTERY BYPASS GRAFT      CORONARY ARTERY BYPASS GRAFT (CABG) N/A 7/20/2022    Procedure: CORONARY ARTERY BYPASS GRAFT (CABG);  Surgeon: Sanju Locke MD;  Location: [facility] OR;  Service: Cardiothoracic;  Laterality: N/A;  2-VESSEL PUMP ASSIST WITH EPI-AORTIC ULTRASOUND    CORONARY BYPASS GRAFT ANGIOGRAPHY  2/13/2024    Procedure: Bypass graft study;  Surgeon: Michelle Holman MD;  Location: [facility] CATH LAB;  Service: Cardiology;;    ENDOSCOPIC HARVEST OF  VEIN Left 7/20/2022    Procedure: SURGICAL PROCUREMENT, VEIN, ENDOSCOPIC;  Surgeon: Sanju Locke MD;  Location: [facility] OR;  Service: Cardiothoracic;  Laterality: Left;    HYSTERECTOMY  1982    INJECTION OF ANESTHETIC AGENT AROUND MULTIPLE INTERCOSTAL NERVES N/A 7/20/2022    Procedure: BLOCK, NERVE, INTERCOSTAL, 2 OR MORE;  Surgeon: Sanju Locke MD;  Location: [facility] OR;  Service: Cardiothoracic;  Laterality: N/A;  PARASTERNAL NERVE BLOCK    INSERTION OF SHUNT      LEFT HEART CATHETERIZATION Left 12/3/2018    Procedure: CATHETERIZATION, HEART, LEFT;  Surgeon: Michelle Holman MD;  Location: [facility] CATH LAB;  Service: Cardiology;  Laterality: Left;  LAD ATHERECTOMY STENT/ FEMORAL APPROACH    LEFT HEART CATHETERIZATION Left 7/13/2022    Procedure: CATHETERIZATION, HEART, LEFT;  Surgeon: Abhi Sloan MD;  Location: [facility] CATH LAB;  Service: Cardiology;  Laterality: Left;    LEFT HEART CATHETERIZATION Left 2/13/2024    Procedure: Left heart cath;  Surgeon: Michelle Holman MD;  Location: [facility] CATH LAB;  Service: Cardiology;  Laterality: Left;    OOPHORECTOMY      RIGHT HEART CATHETERIZATION Right 2/13/2024    Procedure: INSERTION, CATHETER, RIGHT HEART;  Surgeon: Michelle Holman MD;  Location: [facility] CATH LAB;  Service: Cardiology;  Laterality: Right;    TRANSESOPHAGEAL ECHOCARDIOGRAPHY N/A 3/7/2024    Procedure: ECHOCARDIOGRAM, TRANSESOPHAGEAL;  Surgeon: Colton Abreu MD;  Location: [facility] CATH LAB;  Service: Cardiology;  Laterality: N/A;    wrist cyst Right 1980s    dorsal wrist cyst       Review of patient's allergies indicates:   Allergen Reactions    Norvasc [amlodipine] Shortness Of Breath    Codeine Swelling     Other Reaction(s): Itchy skin eruption (code-[id number],SNOMED-CT)    Propoxyphene Swelling     Other Reaction(s): Itchy skin eruption (code-[id number],SNOMED-CT)    Atorvastatin      Muscle twitching     Current Facility-Administered Medications   Medication Frequency    acetaminophen oral solution 650 mg  Q6H PRN    amiodarone tablet 200 mg BID    chlorhexidine 0.12 % solution 15 mL BID    dextrose 10% bolus 125 mL 125 mL PRN    dextrose 10% bolus 250 mL 250 mL PRN    glucagon (human recombinant) injection 1 mg PRN    heparin (porcine) injection 5,000 Units Q8H    insulin aspart U-100 pen 0-10 Units Q4H PRN    levETIRAcetam in NaCl (iso-os) IVPB 1,000 mg QHS    pantoprazole injection 40 mg Daily    polyethylene glycol packet 17 g Daily    sodium chloride 0.9% bolus 250 mL 250 mL PRN    sodium chloride 0.9% bolus 250 mL 250 mL PRN    sodium chloride 0.9% flush 10 mL PRN     Family History       Problem Relation (Age of Onset)    Cancer Maternal Grandmother    Diabetes Mother    Heart disease Mother    Hyperlipidemia Mother    Hypertension Mother    Muscular dystrophy Son          Tobacco Use    Smoking status: Former     Current packs/day: 0.00     Average packs/day: 1.5 packs/day for 30.0 years (45.0 ttl pk-yrs)     Types: Cigarettes     Start date: 1960     Quit date: 1990     Years since quittin.1    Smokeless tobacco: Former   Substance and Sexual Activity    Alcohol use: No     Alcohol/week: 0.0 standard drinks of alcohol    Drug use: No    Sexual activity: Not Currently     Review of Systems   Reason unable to perform ROS: Patient is intubated and sedated.     Objective:     Vital Signs (Most Recent):  Temp: 97 °F (36.1 °C) (24)  Pulse: 68 (24)  Resp: 19 (24)  BP: 136/63 (24)  SpO2: 97 % (24) Vital Signs (24h Range):  Temp:  [96.8 °F (36 °C)-99.1 °F (37.3 °C)] 97 °F (36.1 °C)  Pulse:  [66-79] 68  Resp:  [9-22] 19  SpO2:  [96 %-100 %] 97 %  BP: (107-188)/(53-76) 136/63     Weight: 61.2 kg (134 lb 14.7 oz) (24)  Body mass index is 22.45 kg/m².  Body surface area is 1.68 meters squared.    I/O last 3 completed shifts:  In: 407.1 [NG/GT:310; IV Piggyback:97.1]  Out: 248 [Urine:248]    Physical Exam  Constitutional:       General: She is  not in acute distress.     Appearance: She is ill-appearing.   HENT:      Head: Normocephalic and atraumatic.   Eyes:      General: No scleral icterus.     Extraocular Movements: Extraocular movements intact.      Pupils: Pupils are equal, round, and reactive to light.   Cardiovascular:      Rate and Rhythm: Regular rhythm. Tachycardia present.   Pulmonary:      Effort: Pulmonary effort is normal.      Breath sounds: No stridor.   Musculoskeletal:      Right lower leg: No edema.      Left lower leg: No edema.   Skin:     General: Skin is warm and dry.   Neurological:      Comments: Can not be assessed.   Psychiatric:      Comments: Can not be assessed.         Significant Labs:  BMP:   Recent Labs   Lab 03/12/24  0415   *      K 3.8   CL 92*   CO2 20*   BUN 78*   CREATININE 8.0*   CALCIUM 9.2   MG 2.2       CMP:   Recent Labs   Lab 03/11/24  0433 03/12/24  0415   * 206*   CALCIUM 8.3* 9.2   ALBUMIN 2.5*  --    PROT 5.4*  --     136   K 4.3 3.8   CO2 21* 20*   CL 94* 92*   BUN 62* 78*   CREATININE 7.4* 8.0*   ALKPHOS 208*  --    ALT 29  --    AST 56*  --    BILITOT 0.4  --        All labs within the past 24 hours have been reviewed.    Significant Imaging:  Labs: Reviewed      Assessment/Plan:     Active Diagnoses:    Diagnosis Date Noted POA    PRINCIPAL PROBLEM:  Cardiac arrest [I46.9] 03/07/2024 Yes    Epilepsy [G40.909] 03/08/2024 Unknown    Anoxic brain injury [G93.1] 03/08/2024 No    Goals of care, counseling/discussion [Z71.89] 01/25/2023 Not Applicable    Acute on chronic combined systolic and diastolic heart failure [I50.43] 12/20/2021 Yes    Myoclonic jerking [G25.3] 12/20/2021 Yes    Pulmonary hypertension [I27.20] 11/28/2018 Yes    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, with long-term current use of insulin [E11.3553, Z79.4] 05/02/2016 Not Applicable    ESRD (end stage renal disease) [N18.6]  Yes      Problems Resolved During this Admission:        Assessment and plan:    1. End-stage renal disease:  She appears to have had a devastating brain injury secondary to anoxia.  Neurology notes and Critical Care notes reviewed.    Palliative Care has been consulted.  At this point likelihood of meaningful recovery is minimal.  Continuing with dialysis would not be indicated.      On review of the notes from palliative Care it appears that the family wants to continue with supportive measures.  Will plan to dialyze today and continue on a TTS schedule.    2. Electrolytes:  Potassium is stable at 3.8    3. Acid-base: Serum bicarbonate is stable at 20    4. Volume: She does not appear to be significantly volume overload.  Will use minimal ultrafiltration with dialysis today.      Thank you for your consult.     Adrian Ramirez MD  Nephrology  [facility] ([facility])

## 2024-03-12 NOTE — ASSESSMENT & PLAN NOTE
- s/p cardiac arrest and suspected prolonged down time   - EEG with myoclonus and neuro eval done, continue keppra   - neuro checks, supportive care, avoid fevers   - Repeat CT without acute findings, though limited study  - grave prognosis  - palliative care consulted, ongoing family discussions

## 2024-03-12 NOTE — NURSING
Unable to continue treatment, needles displaced and access inflitrated. Needles pulled after trouble shooting maneuvers proved unsuccessful, hemostasis achieved via direct pressure drsgs , noted without any shadowing. Ice pack to AVF with instructions given to primary ICU RN for 20 min on/30 min off x 2 hours to reduce swelling and allow for dialysis treatment in 24hrs. Dr Ramirez was notified stating we would dialyze tomorrow. RTF with primary ICU RN.

## 2024-03-12 NOTE — PROGRESS NOTES
03/12/24 1300   Required for all Hemodialysis Patients   Hepatitis Status other (see comments)   Handoff Report   Received From Jeremie Magana RN   Given To Jesse Del Rosario RN   Treatment Type   Treatment Type Acute   Vital Signs   Temp 97.2 °F (36.2 °C)   Temp Source Esophageal   Pulse 80   Heart Rate Source Monitor   Resp 14   SpO2 99 %   Pulse Oximetry Type Continuous   Probe Placed On (Pulse Ox) Left:;ear   Oximetry Probe Status Intact   Oxygen Concentration (%) 25   Device (Oxygen Therapy) ventilator   BP (!) 98/51   MAP (mmHg) 72   BP Location Right arm   BP Method Automatic   Patient Position Lying     Arrived at bedside; will prepare for dialysis tx.

## 2024-03-12 NOTE — PLAN OF CARE
[facility] - Int[facility] (Hospital)  Discharge Reassessment    Primary Care Provider: Rose Germain MD    Expected Discharge Date:     Reassessment (most recent)       Discharge Reassessment - 03/12/24 1508          Discharge Reassessment    Assessment Type Discharge Planning Reassessment     Did the patient's condition or plan change since previous assessment? No     Communicated MARIBELL with patient/caregiver Date not available/Unable to determine     Discharge Plan A Other   TBD    DME Needed Upon Discharge  none     Why the patient remains in the hospital Requires continued medical care                   Discharge planning reassessment complete. Patient remains hospitalized in critical condition. Intubated. Palliative following.

## 2024-03-12 NOTE — SUBJECTIVE & OBJECTIVE
Interval History: No acute events overnight.  Has a cough today, but still not really much response to pain.  Vent and hemodynamics stable.    Long discussion with her daughter yesterday.  She knows that her mom would not want to be kept alive on machine indefinitely, but she is interested in giving her mom more time to recover.  We discussed DNR, and she understands that further resuscitation at this point would not fix any problems.  She wants to discuss further with other family members before making a final decision, though.      Objective:     Vital Signs (Most Recent):  Temp: 97.2 °F (36.2 °C) (03/12/24 1015)  Pulse: 72 (03/12/24 1015)  Resp: (!) 8 (03/12/24 1015)  BP: (!) 150/64 (03/12/24 1000)  SpO2: 97 % (03/12/24 1015) Vital Signs (24h Range):  Temp:  [96.8 °F (36 °C)-99.1 °F (37.3 °C)] 97.2 °F (36.2 °C)  Pulse:  [66-79] 72  Resp:  [8-22] 8  SpO2:  [96 %-100 %] 97 %  BP: (107-188)/(53-76) 150/64     Weight: 61.2 kg (134 lb 14.7 oz)  Body mass index is 22.45 kg/m².      Intake/Output Summary (Last 24 hours) at 3/12/2024 1053  Last data filed at 3/12/2024 1000  Gross per 24 hour   Intake 230 ml   Output 133 ml   Net 97 ml        Physical Exam  Vitals and nursing note reviewed.   Constitutional:       General: She is not in acute distress.     Comments: Intubated, no sedation, no response to voice   Cardiovascular:      Rate and Rhythm: Normal rate and regular rhythm.      Pulses: Normal pulses.      Heart sounds: No murmur heard.  Pulmonary:      Effort: Pulmonary effort is normal. No respiratory distress.      Breath sounds: No wheezing, rhonchi or rales.   Abdominal:      General: Abdomen is flat. There is no distension.      Palpations: Abdomen is soft.      Tenderness: There is no abdominal tenderness.   Musculoskeletal:      Right lower leg: No edema.      Left lower leg: No edema.   Neurological:      Comments: Intact L pupillary, no corneal for me, R essentially blind. Has a cough and gag today.  Very  slight posturing to pain in the LUE today, no response to pain in other extremities.           Review of Systems    Vents:  Vent Mode: A/C (03/12/24 0913)  Set Rate: 14 BPM (03/12/24 0913)  Vt Set: 360 mL (03/12/24 0913)  Pressure Support: 7 cmH20 (03/11/24 0725)  PEEP/CPAP: 5 cmH20 (03/12/24 0913)  Oxygen Concentration (%): 25 (03/12/24 1015)  Peak Airway Pressure: 22 cmH20 (03/12/24 0913)  Plateau Pressure: 19 cmH20 (03/12/24 0913)  Total Ve: 7.02 L/m (03/12/24 0913)  Negative Inspiratory Force (cm H2O): 0 (03/12/24 0913)  F/VT Ratio<105 (RSBI): (!) 51.21 (03/12/24 0913)    Lines/Drains/Airways       Central Venous Catheter Line  Duration             Percutaneous Central Line - Triple Lumen  03/07/24 1347 Internal Jugular Right 4 days              Drain  Duration                  NG/OG Tube 03/07/24 1847 Center mouth 4 days         Urethral Catheter 03/07/24 1847 4 days              Airway  Duration                  Airway - Non-Surgical 03/07/24 1259 4 days         Airway - Non-Surgical 03/07/24 1300 Endotracheal Tube 4 days              Peripheral Intravenous Line  Duration                  Hemodialysis AV Fistula 04/01/20 2203 Left upper arm 1440 days                    Significant Labs:    CBC/Anemia Profile:  Recent Labs   Lab 03/11/24  0433 03/12/24  0415   WBC 10.95 8.50   HGB 9.7* 9.2*   HCT 29.0* 26.9*    191   MCV 94 94   RDW 14.5 14.5        Chemistries:  Recent Labs   Lab 03/11/24  0433 03/12/24  0415    136   K 4.3 3.8   CL 94* 92*   CO2 21* 20*   BUN 62* 78*   CREATININE 7.4* 8.0*   CALCIUM 8.3* 9.2   ALBUMIN 2.5*  --    PROT 5.4*  --    BILITOT 0.4  --    ALKPHOS 208*  --    ALT 29  --    AST 56*  --    MG 2.0 2.2       All pertinent labs within the past 24 hours have been reviewed.    Significant Imaging:  I have reviewed all pertinent imaging results/findings within the past 24 hours.

## 2024-03-12 NOTE — PLAN OF CARE
Pt GCS 4. SPO2 99% on vent settings AC/VC, FiO2 30%, Vt 360, and PEEP 5. HR NSR and BP normotensive throughout the shift. OG tube in place with Novosource Renal TF at goal rate of 25 cc/hr continued. NO BM occurred during this shift. Pfeiffer in place with total UOP of 68 mL during this shift. POC reviewed with family. Bed in lowest position, side rails up x 3, wheels locked, and alarms on and audible.

## 2024-03-12 NOTE — PROGRESS NOTES
[facility] - Intensive Care ([facility])  Critical Care Medicine  Progress Note    Patient Name: Avril Monzon  MRN: [id number]  Admission Date: 3/7/2024  Hospital Length of Stay: 5 days  Code Status: Full Code  Attending Provider: Sebastien Olivo MD  Primary Care Provider: Rose Germain MD   Principal Problem: Cardiac arrest    Subjective:     HPI:  79yr old with CMP ef 45%, Severe pulmonary HTN PAP > 85, Valvular heart disease, DM, ESRD, CVA, Retinopathy was getting outpatient EUNICE.  She was given diprivan for the procedure and post procedure remained unresponsive, then during EKG went from tachycardia to asystole.   She was given one roud of Epi, several boluses of vasopressors by anesthesia and was resuscitated for a few minutes. Intubated and bough to ICU.   In ICU CVL was placed and she was placed on levo. She had two more VT / VF episodes requiring CPR, Amiodarone bolus was given in the previous arrest and amiodarone infusion was initiated.   An A line was placed and CT head and other orders were placed. Family was updated by Cardiology. She was also given Ca and bicarbonate times 2 during resuscitation.     Hospital/ICU Course:  3/8. ABG reviewed. Settings changed. Myoclonic activity. Suspect a longer arrest duration in recovery. Tele Neuro consult, Cont EEG and other test pending.  3/9: remains neurologically devastated on the vent, family request meeting at 1600 today, continue supportive care   3/10: no meaningful recovery from a neuro standpoint, remains off sedation on the vent, supportive care   3/11: neuro exam unchanged.  Vent and hemodynamics are stable.  3/12: Has cough today.  No withdrawal to pain.  Vent and hemodynamics stable.    Interval History: No acute events overnight.  Has a cough today, but still not really much response to pain.  Vent and hemodynamics stable.    Long discussion with her daughter yesterday.  She knows that her mom would not want to be kept alive on machine indefinitely,  but she is interested in giving her mom more time to recover.  We discussed DNR, and she understands that further resuscitation at this point would not fix any problems.  She wants to discuss further with other family members before making a final decision, though.      Objective:     Vital Signs (Most Recent):  Temp: 97.2 °F (36.2 °C) (03/12/24 1015)  Pulse: 72 (03/12/24 1015)  Resp: (!) 8 (03/12/24 1015)  BP: (!) 150/64 (03/12/24 1000)  SpO2: 97 % (03/12/24 1015) Vital Signs (24h Range):  Temp:  [96.8 °F (36 °C)-99.1 °F (37.3 °C)] 97.2 °F (36.2 °C)  Pulse:  [66-79] 72  Resp:  [8-22] 8  SpO2:  [96 %-100 %] 97 %  BP: (107-188)/(53-76) 150/64     Weight: 61.2 kg (134 lb 14.7 oz)  Body mass index is 22.45 kg/m².      Intake/Output Summary (Last 24 hours) at 3/12/2024 1053  Last data filed at 3/12/2024 1000  Gross per 24 hour   Intake 230 ml   Output 133 ml   Net 97 ml        Physical Exam  Vitals and nursing note reviewed.   Constitutional:       General: She is not in acute distress.     Comments: Intubated, no sedation, no response to voice   Cardiovascular:      Rate and Rhythm: Normal rate and regular rhythm.      Pulses: Normal pulses.      Heart sounds: No murmur heard.  Pulmonary:      Effort: Pulmonary effort is normal. No respiratory distress.      Breath sounds: No wheezing, rhonchi or rales.   Abdominal:      General: Abdomen is flat. There is no distension.      Palpations: Abdomen is soft.      Tenderness: There is no abdominal tenderness.   Musculoskeletal:      Right lower leg: No edema.      Left lower leg: No edema.   Neurological:      Comments: Intact L pupillary, no corneal for me, R essentially blind. Has a cough and gag today.  Very slight posturing to pain in the LUE today, no response to pain in other extremities.           Review of Systems    Vents:  Vent Mode: A/C (03/12/24 0913)  Set Rate: 14 BPM (03/12/24 0913)  Vt Set: 360 mL (03/12/24 0913)  Pressure Support: 7 cmH20 (03/11/24  0725)  PEEP/CPAP: 5 cmH20 (03/12/24 0913)  Oxygen Concentration (%): 25 (03/12/24 1015)  Peak Airway Pressure: 22 cmH20 (03/12/24 0913)  Plateau Pressure: 19 cmH20 (03/12/24 0913)  Total Ve: 7.02 L/m (03/12/24 0913)  Negative Inspiratory Force (cm H2O): 0 (03/12/24 0913)  F/VT Ratio<105 (RSBI): (!) 51.21 (03/12/24 0913)    Lines/Drains/Airways       Central Venous Catheter Line  Duration             Percutaneous Central Line - Triple Lumen  03/07/24 1347 Internal Jugular Right 4 days              Drain  Duration                  NG/OG Tube 03/07/24 1847 Center mouth 4 days         Urethral Catheter 03/07/24 1847 4 days              Airway  Duration                  Airway - Non-Surgical 03/07/24 1259 4 days         Airway - Non-Surgical 03/07/24 1300 Endotracheal Tube 4 days              Peripheral Intravenous Line  Duration                  Hemodialysis AV Fistula 04/01/20 2203 Left upper arm 1440 days                    Significant Labs:    CBC/Anemia Profile:  Recent Labs   Lab 03/11/24  0433 03/12/24  0415   WBC 10.95 8.50   HGB 9.7* 9.2*   HCT 29.0* 26.9*    191   MCV 94 94   RDW 14.5 14.5        Chemistries:  Recent Labs   Lab 03/11/24  0433 03/12/24  0415    136   K 4.3 3.8   CL 94* 92*   CO2 21* 20*   BUN 62* 78*   CREATININE 7.4* 8.0*   CALCIUM 8.3* 9.2   ALBUMIN 2.5*  --    PROT 5.4*  --    BILITOT 0.4  --    ALKPHOS 208*  --    ALT 29  --    AST 56*  --    MG 2.0 2.2       All pertinent labs within the past 24 hours have been reviewed.    Significant Imaging:  I have reviewed all pertinent imaging results/findings within the past 24 hours.    ABG  Recent Labs   Lab 03/12/24  0322   PH 7.447   PO2 156*   PCO2 34.7*   HCO3 23.9*   BE 0     Assessment/Plan:     Neuro  Anoxic brain injury  - s/p cardiac arrest and suspected prolonged down time   - EEG with myoclonus and neuro eval done, continue keppra   - neuro checks, supportive care, avoid fevers   - Repeat CT without acute findings, though  limited study  - grave prognosis  - palliative care consulted, ongoing family discussions     Myoclonic jerking  - see plan for anoxic injury    Cardiac/Vascular  * Cardiac arrest  - cardiology following  - supportive care  - avoid fevers   - see plan for anoxia     Acute on chronic combined systolic and diastolic heart failure  - per cards  - monitor I&Os    Pulmonary hypertension  - severe PAP 88mmHg  - contributing to and complicating medical decision making    Renal/  ESRD (end stage renal disease)  - nephro following   - discussed with Dr Ramirez; given the family's wishes for more time, will plan for HD today  - renally dose meds and avoid nephrotoxins    Endocrine  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, with long-term current use of insulin  - SSI/Accuchecks    Palliative Care  Goals of care, counseling/discussion  - continue discussions and provide support        Critical Care Time: 35 minutes  Critical secondary to respiratory failure      Critical care was time spent personally by me on the following activities: development of treatment plan with patient or surrogate and bedside caregivers, discussions with consultants, evaluation of patient's response to treatment, examination of patient, ordering and performing treatments and interventions, ordering and review of laboratory studies, ordering and review of radiographic studies, pulse oximetry, re-evaluation of patient's condition. This critical care time did not overlap with that of any other provider or involve time for any procedures.     Sebastien Olivo MD  Critical Care Medicine  [facility] - Intensive Care ([facility])

## 2024-03-12 NOTE — PLAN OF CARE
Patient remains stable for duration of shift. VSS, NSR on monitor. Neurological status remains unchanged. POC reviewed with patient. Care ongoing.

## 2024-03-12 NOTE — ASSESSMENT & PLAN NOTE
- nephro following   - discussed with Dr Ramirez; given the family's wishes for more time, will plan for HD today  - renally dose meds and avoid nephrotoxins

## 2024-03-13 PROBLEM — Z51.5 ENCOUNTER FOR PALLIATIVE CARE: Status: ACTIVE | Noted: 2024-03-13

## 2024-03-13 LAB
ALLENS TEST: ABNORMAL
ANION GAP SERPL CALC-SCNC: 20 MMOL/L (ref 8–16)
BASOPHILS # BLD AUTO: 0.01 K/UL (ref 0–0.2)
BASOPHILS NFR BLD: 0.1 % (ref 0–1.9)
BUN SERPL-MCNC: 87 MG/DL (ref 8–23)
CALCIUM SERPL-MCNC: 9.4 MG/DL (ref 8.7–10.5)
CHLORIDE SERPL-SCNC: 93 MMOL/L (ref 95–110)
CO2 SERPL-SCNC: 20 MMOL/L (ref 23–29)
CREAT SERPL-MCNC: 8.2 MG/DL (ref 0.5–1.4)
DELSYS: ABNORMAL
DIFFERENTIAL METHOD BLD: ABNORMAL
EOSINOPHIL # BLD AUTO: 0 K/UL (ref 0–0.5)
EOSINOPHIL NFR BLD: 0.4 % (ref 0–8)
ERYTHROCYTE [DISTWIDTH] IN BLOOD BY AUTOMATED COUNT: 14.6 % (ref 11.5–14.5)
ERYTHROCYTE [SEDIMENTATION RATE] IN BLOOD BY WESTERGREN METHOD: 14 MM/H
EST. GFR  (NO RACE VARIABLE): 5 ML/MIN/1.73 M^2
FIO2: 25
GLUCOSE SERPL-MCNC: 262 MG/DL (ref 70–110)
HCO3 UR-SCNC: 23.4 MMOL/L (ref 24–28)
HCT VFR BLD AUTO: 26.3 % (ref 37–48.5)
HGB BLD-MCNC: 9 G/DL (ref 12–16)
IMM GRANULOCYTES # BLD AUTO: 0.04 K/UL (ref 0–0.04)
IMM GRANULOCYTES NFR BLD AUTO: 0.4 % (ref 0–0.5)
LYMPHOCYTES # BLD AUTO: 0.4 K/UL (ref 1–4.8)
LYMPHOCYTES NFR BLD: 3.9 % (ref 18–48)
MAGNESIUM SERPL-MCNC: 2.3 MG/DL (ref 1.6–2.6)
MCH RBC QN AUTO: 31.8 PG (ref 27–31)
MCHC RBC AUTO-ENTMCNC: 34.2 G/DL (ref 32–36)
MCV RBC AUTO: 93 FL (ref 82–98)
MODE: ABNORMAL
MONOCYTES # BLD AUTO: 0.6 K/UL (ref 0.3–1)
MONOCYTES NFR BLD: 6.3 % (ref 4–15)
NEUTROPHILS # BLD AUTO: 8.1 K/UL (ref 1.8–7.7)
NEUTROPHILS NFR BLD: 88.9 % (ref 38–73)
NRBC BLD-RTO: 0 /100 WBC
PCO2 BLDA: 35.9 MMHG (ref 35–45)
PEEP: 5
PH SMN: 7.42 [PH] (ref 7.35–7.45)
PLATELET # BLD AUTO: 185 K/UL (ref 150–450)
PMV BLD AUTO: 9.4 FL (ref 9.2–12.9)
PO2 BLDA: 95 MMHG (ref 80–100)
POC BE: -1 MMOL/L
POC SATURATED O2: 98 % (ref 95–100)
POCT GLUCOSE: 180 MG/DL (ref 70–110)
POCT GLUCOSE: 203 MG/DL (ref 70–110)
POCT GLUCOSE: 215 MG/DL (ref 70–110)
POCT GLUCOSE: 235 MG/DL (ref 70–110)
POCT GLUCOSE: 244 MG/DL (ref 70–110)
POCT GLUCOSE: 289 MG/DL (ref 70–110)
POTASSIUM SERPL-SCNC: 3.9 MMOL/L (ref 3.5–5.1)
RBC # BLD AUTO: 2.83 M/UL (ref 4–5.4)
SAMPLE: ABNORMAL
SITE: ABNORMAL
SODIUM SERPL-SCNC: 133 MMOL/L (ref 136–145)
VT: 360
WBC # BLD AUTO: 9.12 K/UL (ref 3.9–12.7)

## 2024-03-13 PROCEDURE — 80048 BASIC METABOLIC PNL TOTAL CA: CPT | Mod: HCNC | Performed by: NURSE PRACTITIONER

## 2024-03-13 PROCEDURE — 94003 VENT MGMT INPAT SUBQ DAY: CPT | Mod: HCNC

## 2024-03-13 PROCEDURE — 82803 BLOOD GASES ANY COMBINATION: CPT | Mod: HCNC

## 2024-03-13 PROCEDURE — 99223 1ST HOSP IP/OBS HIGH 75: CPT | Mod: HCNC,,, | Performed by: NURSE PRACTITIONER

## 2024-03-13 PROCEDURE — 63600175 PHARM REV CODE 636 W HCPCS: Mod: HCNC | Performed by: INTERNAL MEDICINE

## 2024-03-13 PROCEDURE — 99900035 HC TECH TIME PER 15 MIN (STAT): Mod: HCNC

## 2024-03-13 PROCEDURE — 99498 ADVNCD CARE PLAN ADDL 30 MIN: CPT | Mod: HCNC,,, | Performed by: NURSE PRACTITIONER

## 2024-03-13 PROCEDURE — 25000003 PHARM REV CODE 250: Mod: HCNC | Performed by: NURSE PRACTITIONER

## 2024-03-13 PROCEDURE — 80100014 HC HEMODIALYSIS 1:1: Mod: HCNC

## 2024-03-13 PROCEDURE — 63600175 PHARM REV CODE 636 W HCPCS: Mod: HCNC | Performed by: SPECIALIST

## 2024-03-13 PROCEDURE — 94761 N-INVAS EAR/PLS OXIMETRY MLT: CPT | Mod: HCNC

## 2024-03-13 PROCEDURE — 99900026 HC AIRWAY MAINTENANCE (STAT): Mod: HCNC

## 2024-03-13 PROCEDURE — 63600175 PHARM REV CODE 636 W HCPCS: Mod: HCNC | Performed by: NURSE PRACTITIONER

## 2024-03-13 PROCEDURE — 25000003 PHARM REV CODE 250: Mod: HCNC | Performed by: SPECIALIST

## 2024-03-13 PROCEDURE — 20000000 HC ICU ROOM: Mod: HCNC

## 2024-03-13 PROCEDURE — 99232 SBSQ HOSP IP/OBS MODERATE 35: CPT | Mod: HCNC,,, | Performed by: INTERNAL MEDICINE

## 2024-03-13 PROCEDURE — 27100171 HC OXYGEN HIGH FLOW UP TO 24 HOURS: Mod: HCNC

## 2024-03-13 PROCEDURE — 99497 ADVNCD CARE PLAN 30 MIN: CPT | Mod: HCNC,25,, | Performed by: NURSE PRACTITIONER

## 2024-03-13 PROCEDURE — 25000003 PHARM REV CODE 250: Mod: HCNC | Performed by: INTERNAL MEDICINE

## 2024-03-13 PROCEDURE — 85025 COMPLETE CBC W/AUTO DIFF WBC: CPT | Mod: HCNC | Performed by: NURSE PRACTITIONER

## 2024-03-13 PROCEDURE — 36600 WITHDRAWAL OF ARTERIAL BLOOD: CPT | Mod: HCNC

## 2024-03-13 PROCEDURE — C9113 INJ PANTOPRAZOLE SODIUM, VIA: HCPCS | Mod: HCNC | Performed by: NURSE PRACTITIONER

## 2024-03-13 PROCEDURE — 83735 ASSAY OF MAGNESIUM: CPT | Mod: HCNC | Performed by: NURSE PRACTITIONER

## 2024-03-13 RX ADMIN — HEPARIN SODIUM 5000 UNITS: 5000 INJECTION INTRAVENOUS; SUBCUTANEOUS at 10:03

## 2024-03-13 RX ADMIN — INSULIN ASPART 3 UNITS: 100 INJECTION, SOLUTION INTRAVENOUS; SUBCUTANEOUS at 04:03

## 2024-03-13 RX ADMIN — CHLORHEXIDINE GLUCONATE 0.12% ORAL RINSE 15 ML: 1.2 LIQUID ORAL at 08:03

## 2024-03-13 RX ADMIN — HEPARIN SODIUM 5000 UNITS: 5000 INJECTION INTRAVENOUS; SUBCUTANEOUS at 01:03

## 2024-03-13 RX ADMIN — INSULIN ASPART 2 UNITS: 100 INJECTION, SOLUTION INTRAVENOUS; SUBCUTANEOUS at 12:03

## 2024-03-13 RX ADMIN — PANTOPRAZOLE SODIUM 40 MG: 40 INJECTION, POWDER, FOR SOLUTION INTRAVENOUS at 08:03

## 2024-03-13 RX ADMIN — INSULIN ASPART 4 UNITS: 100 INJECTION, SOLUTION INTRAVENOUS; SUBCUTANEOUS at 04:03

## 2024-03-13 RX ADMIN — AMIODARONE HYDROCHLORIDE 200 MG: 200 TABLET ORAL at 08:03

## 2024-03-13 RX ADMIN — INSULIN ASPART 4 UNITS: 100 INJECTION, SOLUTION INTRAVENOUS; SUBCUTANEOUS at 08:03

## 2024-03-13 RX ADMIN — AMIODARONE HYDROCHLORIDE 200 MG: 200 TABLET ORAL at 09:03

## 2024-03-13 RX ADMIN — HEPARIN SODIUM 5000 UNITS: 5000 INJECTION INTRAVENOUS; SUBCUTANEOUS at 06:03

## 2024-03-13 RX ADMIN — FENTANYL CITRATE 25 MCG: 50 INJECTION INTRAMUSCULAR; INTRAVENOUS at 01:03

## 2024-03-13 RX ADMIN — POLYETHYLENE GLYCOL 3350 17 G: 17 POWDER, FOR SOLUTION ORAL at 08:03

## 2024-03-13 RX ADMIN — INSULIN ASPART 2 UNITS: 100 INJECTION, SOLUTION INTRAVENOUS; SUBCUTANEOUS at 11:03

## 2024-03-13 RX ADMIN — LEVETIRACETAM INJECTION 1000 MG: 10 INJECTION INTRAVENOUS at 09:03

## 2024-03-13 RX ADMIN — CHLORHEXIDINE GLUCONATE 0.12% ORAL RINSE 15 ML: 1.2 LIQUID ORAL at 09:03

## 2024-03-13 NOTE — PLAN OF CARE
Patient remains stable for duration of shift. VSS, NSR on monitor. Neurological status unchanged. POC/GOC reviewed with family. Awaiting family conference on Friday at 2 pm. Care ongoing.

## 2024-03-13 NOTE — HPI
Mrs. Monzon is a 79yr old female with CMP ef 45%, Severe pulmonary HTN PAP > 85, Valvular heart disease, DM, ESRD, CVA, Retinopathy who presented to hospital for outpatient EUNICE. She was given diprivan for the procedure and post procedure remained unresponsive, then during EKG went from tachycardia to asystole. She was given one roud of Epi, several boluses of vasopressors by anesthesia and was resuscitated for a few minutes. Intubated and bough to ICU. In ICU CVL was placed and she was placed on levo. She had two more VT / VF episodes requiring CPR, Amiodarone bolus was given in the previous arrest and amiodarone infusion was initiated. An A line was placed and CT head and other orders were placed. Family was updated by Cardiology. She was also given Ca and bicarbonate times 2 during resuscitation. During hospital coarse, it was suspected a longer arrest duration in recovery in which neruo was consulted and EEG was done. Patient noted to remain neurologically devastated on the vent in which neurology noted no meaningful recovery. Patient remains off sedation with no significant changes Now noted with weak cough, faint posturing bilaterally. Nephrology following and patient was unable to dialyze yesterday due to difficulty with fistula access, patient did undergo dialysis on today. ICU team provided daughter/YESENIA Johnson with updates, palliative medicine consulted for goals of care and advance care planning.

## 2024-03-13 NOTE — CONSULTS
[facility] - Intensive Care ([facility])  Nephrology  Consult Note    Patient Name: Avril Monzon  MRN: [id number]  Admission Date: 3/7/2024  Hospital Length of Stay: 6 days  Attending Provider: Sebastien Olivo MD   Primary Care Physician: Rose Germain MD  Principal Problem:Cardiac arrest    Consults  Subjective:     HPI:  79-year-old female with history of end-stage renal disease.  Admitted after having cardiac arrest during a transesophageal echocardiogram.  Nephrology has been consulted for maintenance of dialysis and end-stage renal disease related issues.  Patient was seen in the intensive care unit.  In bed resting comfortably.  Intubated on multiple drips.  There is a family member at the bedside.  All nephrology related questions were answered to her satisfaction.    03/09/2024:  Patient was seen in the intensive care unit.  In bed resting comfortably.  Remains intubated.  Events from overnight noted.    03/10/2024:  Patient was seen in the intensive care unit.  Remains intubated.  Sedation was discontinued.  Documented GCS 3.  Felt to have severe anoxic brain injury.    03/11/2024:  Patient was seen in the intensive care unit.  Remains intubated.  No change in neuro exam.    03/12/2024: Patient was seen in the intensive care unit.  Remains intubated.  No improvement in neurological exam.    03/13/2024:  Patient was seen in the intensive care unit.  Remains intubated and sedated.  No change in neuro exam.    Past Medical History:   Diagnosis Date    Abnormal nuclear stress test 02/12/2024    Acute hypoxemic respiratory failure 11/26/2018    Anemia     Arthritis     back, hand, knees    Back pain     Cataract     CKD stage G4/A3, GFR 15 - 29 and albumin creatinine ratio >300 mg/g     GFR 40% Jan 2014 and 33% ub 3/2014 ([facility])    Coronary artery disease involving native coronary artery of native heart 11/30/2018    Diabetic retinopathy     DM (diabetes mellitus) type II controlled, neurological manifestation      Exudative age-related macular degeneration of left eye with active choroidal neovascularization 02/05/2019    GERD (gastroesophageal reflux disease)     Glaucoma     Heart failure     Hyperlipidemia     Hypertension     Non-ST elevation MI (NSTEMI) 11/28/2018    NSTEMI (non-ST elevated myocardial infarction) 11/27/2018    Peripheral neuropathy     Polyneuropathy     Proteinuria     >6 mo     Seizures     BRG 1/2014 -dick CT NRI showed small vessel    Skin ulcer of toe of right foot with fat layer exposed 10/14/2022    Stroke 2012,2014    Tobacco dependence     Type 2 diabetes with peripheral circulatory disorder, controlled        Past Surgical History:   Procedure Laterality Date    ANGIOGRAM, LOWER ARTERIAL, UNILATERAL  2/13/2024    Procedure: Angiogram, Lower Arterial, Unilateral;  Surgeon: Michelle Holman MD;  Location: [facility] CATH LAB;  Service: Cardiology;;    ARTERIOGRAPHY OF SUBCLAVIAN ARTERY  2/13/2024    Procedure: ARTERIOGRAM, SUBCLAVIAN;  Surgeon: Michelle Holman MD;  Location: [facility] CATH LAB;  Service: Cardiology;;    BREAST LUMPECTOMY Left     benign, when patient was 15 years old    BREAST MASS EXCISION      ,benign, age 15.    CATHETERIZATION OF BOTH LEFT AND RIGHT HEART N/A 11/28/2018    Procedure: CATHETERIZATION, HEART, BOTH LEFT AND RIGHT;  Surgeon: Michelle Holman MD;  Location: [facility] CATH LAB;  Service: Cardiology;  Laterality: N/A;    CATHETERIZATION OF BOTH LEFT AND RIGHT HEART N/A 11/30/2018    Procedure: CATHETERIZATION, HEART, BOTH LEFT AND RIGHT;  Surgeon: Michelle Holman MD;  Location: [facility] CATH LAB;  Service: Cardiology;  Laterality: N/A;    CORONARY ARTERY BYPASS GRAFT      CORONARY ARTERY BYPASS GRAFT (CABG) N/A 7/20/2022    Procedure: CORONARY ARTERY BYPASS GRAFT (CABG);  Surgeon: Sanju Locke MD;  Location: [facility] OR;  Service: Cardiothoracic;  Laterality: N/A;  2-VESSEL PUMP ASSIST WITH EPI-AORTIC ULTRASOUND    CORONARY BYPASS GRAFT ANGIOGRAPHY  2/13/2024    Procedure: Bypass  graft study;  Surgeon: Michelle Holman MD;  Location: [facility] CATH LAB;  Service: Cardiology;;    ENDOSCOPIC HARVEST OF VEIN Left 7/20/2022    Procedure: SURGICAL PROCUREMENT, VEIN, ENDOSCOPIC;  Surgeon: Sanju Locke MD;  Location: [facility] OR;  Service: Cardiothoracic;  Laterality: Left;    HYSTERECTOMY  1982    INJECTION OF ANESTHETIC AGENT AROUND MULTIPLE INTERCOSTAL NERVES N/A 7/20/2022    Procedure: BLOCK, NERVE, INTERCOSTAL, 2 OR MORE;  Surgeon: Sanju Locke MD;  Location: [facility] OR;  Service: Cardiothoracic;  Laterality: N/A;  PARASTERNAL NERVE BLOCK    INSERTION OF SHUNT      LEFT HEART CATHETERIZATION Left 12/3/2018    Procedure: CATHETERIZATION, HEART, LEFT;  Surgeon: Michelle Holman MD;  Location: [facility] CATH LAB;  Service: Cardiology;  Laterality: Left;  LAD ATHERECTOMY STENT/ FEMORAL APPROACH    LEFT HEART CATHETERIZATION Left 7/13/2022    Procedure: CATHETERIZATION, HEART, LEFT;  Surgeon: Abhi Sloan MD;  Location: [facility] CATH LAB;  Service: Cardiology;  Laterality: Left;    LEFT HEART CATHETERIZATION Left 2/13/2024    Procedure: Left heart cath;  Surgeon: Michelle Holman MD;  Location: [facility] CATH LAB;  Service: Cardiology;  Laterality: Left;    OOPHORECTOMY      RIGHT HEART CATHETERIZATION Right 2/13/2024    Procedure: INSERTION, CATHETER, RIGHT HEART;  Surgeon: Michelle Holman MD;  Location: [facility] CATH LAB;  Service: Cardiology;  Laterality: Right;    TRANSESOPHAGEAL ECHOCARDIOGRAPHY N/A 3/7/2024    Procedure: ECHOCARDIOGRAM, TRANSESOPHAGEAL;  Surgeon: Colton Abreu MD;  Location: [facility] CATH LAB;  Service: Cardiology;  Laterality: N/A;    wrist cyst Right 1980s    dorsal wrist cyst       Review of patient's allergies indicates:   Allergen Reactions    Norvasc [amlodipine] Shortness Of Breath    Codeine Swelling     Other Reaction(s): Itchy skin eruption (code-[id number],SNOMED-CT)    Propoxyphene Swelling     Other Reaction(s): Itchy skin eruption (code-[id number],SNOMED-CT)    Atorvastatin       Muscle twitching     Current Facility-Administered Medications   Medication Frequency    acetaminophen oral solution 650 mg Q6H PRN    amiodarone tablet 200 mg BID    chlorhexidine 0.12 % solution 15 mL BID    dextrose 10% bolus 125 mL 125 mL PRN    dextrose 10% bolus 250 mL 250 mL PRN    fentaNYL 50 mcg/mL injection 25 mcg Once    fentaNYL 50 mcg/mL injection 25 mcg Q1H PRN    glucagon (human recombinant) injection 1 mg PRN    heparin (porcine) injection 5,000 Units Q8H    insulin aspart U-100 pen 0-10 Units Q4H PRN    levETIRAcetam in NaCl (iso-os) IVPB 1,000 mg QHS    pantoprazole injection 40 mg Daily    polyethylene glycol packet 17 g Daily    sodium chloride 0.9% bolus 250 mL 250 mL PRN    sodium chloride 0.9% bolus 250 mL 250 mL PRN    sodium chloride 0.9% flush 10 mL PRN     Family History       Problem Relation (Age of Onset)    Cancer Maternal Grandmother    Diabetes Mother    Heart disease Mother    Hyperlipidemia Mother    Hypertension Mother    Muscular dystrophy Son          Tobacco Use    Smoking status: Former     Current packs/day: 0.00     Average packs/day: 1.5 packs/day for 30.0 years (45.0 ttl pk-yrs)     Types: Cigarettes     Start date: 1960     Quit date: 1990     Years since quittin.1    Smokeless tobacco: Former   Substance and Sexual Activity    Alcohol use: No     Alcohol/week: 0.0 standard drinks of alcohol    Drug use: No    Sexual activity: Not Currently     Review of Systems   Reason unable to perform ROS: Patient is intubated and sedated.     Objective:     Vital Signs (Most Recent):  Temp: 97.7 °F (36.5 °C) (24)  Pulse: 78 (24)  Resp: 15 (24)  BP: 124/60 (24)  SpO2: 98 % (24) Vital Signs (24h Range):  Temp:  [96.8 °F (36 °C)-99.1 °F (37.3 °C)] 97.7 °F (36.5 °C)  Pulse:  [68-93] 78  Resp:  [8-28] 15  SpO2:  [93 %-100 %] 98 %  BP: ()/(50-92) 124/60     Weight: 63 kg (138 lb 14.2 oz) (24 8335)  Body mass  index is 23.11 kg/m².  Body surface area is 1.7 meters squared.    I/O last 3 completed shifts:  In: 635 [NG/GT:535; IV Piggyback:100]  Out: 159 [Urine:159]    Physical Exam  Constitutional:       General: She is not in acute distress.     Appearance: She is ill-appearing.   HENT:      Head: Normocephalic and atraumatic.   Eyes:      General: No scleral icterus.     Extraocular Movements: Extraocular movements intact.      Pupils: Pupils are equal, round, and reactive to light.   Cardiovascular:      Rate and Rhythm: Regular rhythm. Tachycardia present.   Pulmonary:      Effort: Pulmonary effort is normal.      Breath sounds: No stridor.   Musculoskeletal:      Right lower leg: No edema.      Left lower leg: No edema.   Skin:     General: Skin is warm and dry.   Neurological:      Comments: Can not be assessed.   Psychiatric:      Comments: Can not be assessed.         Significant Labs:  BMP:   Recent Labs   Lab 03/13/24  0412   *   *   K 3.9   CL 93*   CO2 20*   BUN 87*   CREATININE 8.2*   CALCIUM 9.4   MG 2.3       CMP:   Recent Labs   Lab 03/11/24  0433 03/12/24  0415 03/13/24  0412   *   < > 262*   CALCIUM 8.3*   < > 9.4   ALBUMIN 2.5*  --   --    PROT 5.4*  --   --       < > 133*   K 4.3   < > 3.9   CO2 21*   < > 20*   CL 94*   < > 93*   BUN 62*   < > 87*   CREATININE 7.4*   < > 8.2*   ALKPHOS 208*  --   --    ALT 29  --   --    AST 56*  --   --    BILITOT 0.4  --   --     < > = values in this interval not displayed.       All labs within the past 24 hours have been reviewed.    Significant Imaging:  Labs: Reviewed      Assessment/Plan:     Active Diagnoses:    Diagnosis Date Noted POA    PRINCIPAL PROBLEM:  Cardiac arrest [I46.9] 03/07/2024 Yes    Epilepsy [G40.909] 03/08/2024 No    Anoxic brain injury [G93.1] 03/08/2024 No    Goals of care, counseling/discussion [Z71.89] 01/25/2023 Not Applicable    Acute on chronic combined systolic and diastolic heart failure [I50.43] 12/20/2021  Yes    Myoclonic jerking [G25.3] 12/20/2021 Yes    Pulmonary hypertension [I27.20] 11/28/2018 Yes    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, with long-term current use of insulin [E11.3553, Z79.4] 05/02/2016 Not Applicable    ESRD (end stage renal disease) [N18.6]  Yes      Problems Resolved During this Admission:       Assessment and plan:    1. End-stage renal disease:  She appears to have had a devastating brain injury secondary to anoxia.  Neurology notes and Critical Care notes reviewed.    Palliative Care has been consulted.  At this point likelihood of meaningful recovery is minimal.  Continuing with dialysis would not be indicated.      On review of notes from palliative Care and critical Care Medicine.  The family wants to continue with medical support.  Will plan to continue dialysis on a Monday Wednesday Friday schedule moving forward.  Will continue to review palliative care notes.    Dialysis is scheduled for later today.      2. Electrolytes:  Potassium is stable at 3.9.    3. Acid-base: Serum bicarbonate is stable at 20    4. Volume: She does not appear to be significantly volume overload.  Will use minimal ultrafiltration with dialysis today.      Thank you for your consult.     Adrian Ramirez MD  Nephrology  [facility] - Intensive Care ([facility])

## 2024-03-13 NOTE — PROGRESS NOTES
[facility] - Intensive Care ([facility])  Adult Nutrition  Progress Note    SUMMARY       Recommendations    Recommendation/Intervention:   1. Recommend pt continue on Enteral nutrition, Novasource renal via OG tube, goal rate 25 mL/hr, starting at 10 mL/hr then advance within 24 hrs if pt tolerating or per MD/NP  - Formula at goal rate will provide: 1200 kcal (95% EEN), 54 g Protein (86% EPN), 430 mL/day free formula water  - 80 mL FWF q6h (320 mL/day) = total from formula + FWF = 750 mL water/day, per MD/NP  - Monitor Mg, Na, K+, Phos, and Glu, correct as indicated  2. Recommend continue bowel regimen (No BM x 7 days)   3. If pt transitioned to comfort care and nutrition warranted, recommend providing pt with diet as desired and tolerated   4. Weight S/p dialysis     Goals:   1. Pt will be initiated onto EN within 24 hrs (Resolved)   2. Pt will consume and tolerate >50% of EEN and EPN prior to RD follow up  3. If on comfort care and nutrition warranted, pt will consume desired diet with safest texture during admit  4. Pt will have BM prior to RD follow up   Nutrition Goal Status: resolved, continues, new  Communication of RD Recs: other (comment) (POC: Sticky note)    Assessment and Plan    Nutrition Problem  Inadequate oral intake  Altered GI function      Related to (etiology):   Decreased ability to consume sufficient nutrition  Alteration in GI tract structure and/or function      Signs and Symptoms (as evidenced by):   NPO, Vent  Constipation (No BM x 7 days)      Interventions(treatment strategy):  1. Enteral Nutrition  2. Prescription medication   3. Collaboration with other providers     Nutrition Diagnosis Status:   Continues/ New     Malnutrition Assessment     Skin (Micronutrient): dry, bruised, edema (Robret score = 10 (high risk)       Fluid Accumulation (Malnutrition):  (1+ trace)   Orbital Region (Subcutaneous Fat Loss): moderate depletion (Somewhat hallow look)                     Reason for  "Assessment    Reason For Assessment: consult, new tube feeding  Diagnosis: cardiac disease, surgery/postoperative complications (Cardiac arrest)  Relevant Medical History: Cardiac arrest, ESRD, T2DM, Pulmonary hypertension, CHF, Epilepsy, Anoxic brain injury, CAD, CVA, HLD  Interdisciplinary Rounds: did not attend  General Information Comments:   79 y.o female admitted with active principal problem of Cardiac arrest. Pt currently in ICU intubated (Total Ve: 7.58) tolerating current vent settings. Sedation has been stopped. Pt remains unresponsive w/ no neurologic recovery thus far. Pt noted to have Anoxic brain injury, s/p cardiac arrest and suspected prolonged down time. Pt with ESRD, Nephrology plan for CRRT in near future but currently on hold d/t unchanged critical condition and no acute need. NFPE not performed, Dietitian will assess the need for NFPE during RD follow up. Pt currently charted to weigh 126 lb 12.2 oz, BMI 21.09 (Underweight for age).    Nutrition Discharge Planning: Dependent on medical treatment     Follow up:   3/13/24: RD follow up. Pt currently intubated, NPO in the ICU, receiving Novasource renal via OG tube. EMR noted that the pt neuro unchanged, received dialysis today, vent and hemodynamically stable, Palliative planning to meet w/ family to discuss GOC regarding pt's living will, possible comfort focused/ palliative extubation/ in pt hospice to honor pt wishes. Spoke to RN, stated she paused pt's TF's d/t some "milky secretions" from pt's mouth, plans to restart in a few hours @ 10 mL/hr, confirmed pt experiencing constipation x 1 week despite efforts. Visited pt at bedside, pt sleeping, pt niece present, visually confirmed TF was on hold, pt family member reported pt UBW generally around 125 lbs, stated her aunt has always been small framed and has not notice any recent wt loss. Visual NFPE performed, noted moderate orbital malnutrition, may be age related, unable to perform full d/t " "pt was covered w/ blankets. Reviewed chart: Propofol: 0; Total VE: 5.86; Skin: dry, bruised; Robert score: 10 (high risk); Edema: 1+ trace generalized. Labs, meds, weight reviewed. Weight charted 3/10 126 lbs, 3/13 138 lbs (BMI 23.11, Normal), +12 lb wt gain x 3 days, re weigh for accuracy warranted. RD will continue to follow and monitor pt's nutritional status during admit.     Nutrition Risk Screen    Nutrition Risk Screen: tube feeding or parenteral nutrition    Nutrition Related Social Determinants of Health: SDOH: Unable to assess at this time.      Nutrition/Diet History    Spiritual, Cultural Beliefs, Yazidi Practices, Values that Affect Care:  (JOEY)  Food Allergies: NKFA  Factors Affecting Nutritional Intake: NPO, on mechanical ventilation    Anthropometrics    Temp: 98.2 °F (36.8 °C)  Height Method: Stated  Height: 5' 5" (165.1 cm)  Height (inches): 65 in  Weight Method: Bed Scale  Weight: 63 kg (138 lb 14.2 oz)  Weight (lb): 138.89 lb  Ideal Body Weight (IBW), Female: 125 lb  % Ideal Body Weight, Female (lb): 101.42 %  BMI (Calculated): 23.1  BMI Grade: 18.5-24.9 - normal (Underweight for age)     Wt Readings from Last 15 Encounters:   03/13/24 63 kg (138 lb 14.2 oz)   03/02/24 64.7 kg (142 lb 10.2 oz)   02/19/24 57.1 kg (125 lb 14.1 oz)   02/13/24 58.2 kg (128 lb 4.9 oz)   01/30/24 58.1 kg (128 lb)   01/30/24 58.2 kg (128 lb 4.9 oz)   01/17/24 58.4 kg (128 lb 12 oz)   01/11/24 59.4 kg (130 lb 15.3 oz)   01/04/24 59.5 kg (131 lb 1 oz)   12/28/23 59.8 kg (131 lb 15.1 oz)   11/28/23 60.4 kg (133 lb 1.6 oz)   11/22/23 61 kg (134 lb 8 oz)   11/14/23 60.9 kg (134 lb 4.2 oz)   09/22/23 61.5 kg (135 lb 8 oz)   09/14/23 62.2 kg (137 lb 1.6 oz)     Lab/Procedures/Meds    Pertinent Labs Reviewed: reviewed  Pertinent Medications Reviewed: reviewed  BMP  Lab Results   Component Value Date     (L) 03/13/2024    K 3.9 03/13/2024    CL 93 (L) 03/13/2024    CO2 20 (L) 03/13/2024    BUN 87 (H) 03/13/2024    " CREATININE 8.2 (H) 03/13/2024    CALCIUM 9.4 03/13/2024    ANIONGAP 20 (H) 03/13/2024    EGFRNORACEVR 5 (A) 03/13/2024     Lab Results   Component Value Date    ALBUMIN 2.5 (L) 03/11/2024     Recent Labs   Lab 03/13/24  1153   POCTGLUCOSE 180*     Lab Results   Component Value Date    HGBA1C 6.1 (H) 03/08/2024     Lab Results   Component Value Date    WBC 9.12 03/13/2024    HGB 9.0 (L) 03/13/2024    HCT 26.3 (L) 03/13/2024    MCV 93 03/13/2024     03/13/2024     Scheduled Meds:   amiodarone  200 mg Per OG tube BID    chlorhexidine  15 mL Mouth/Throat BID    fentaNYL  25 mcg Intravenous Once    heparin (porcine)  5,000 Units Subcutaneous Q8H    levETIRAcetam (Keppra) IV (PEDS and ADULTS)  1,000 mg Intravenous QHS    pantoprazole  40 mg Intravenous Daily    polyethylene glycol  17 g Per NG tube Daily     Continuous Infusions:  PRN Meds:.acetaminophen, dextrose 10%, dextrose 10%, fentaNYL, glucagon (human recombinant), insulin aspart U-100, sodium chloride 0.9%, sodium chloride 0.9%, sodium chloride 0.9%    Physical Findings/Assessment         Estimated/Assessed Needs    Weight Used For Calorie Calculations: 63 kg (138 lb 14.2 oz)  Energy Calorie Requirements (kcal): 1266 kcals ([facility] (Critical care-vent, Total VE: 5.86, BMI 23.11)  Energy Need Method: [facility]  Protein Requirements: 63-75 g (1.0-1.2 g/kg ABW (ESRD on HD, diabetic vs Critical care-vent BMI < 30)  Weight Used For Protein Calculations: 63 kg (138 lb 14.2 oz)  Fluid Requirements (mL): 750-1500 mL (ESRD on HD)  Estimated Fluid Requirement Method: other (see comments)  RDA Method (mL): 1266  CHO Requirement: 177 g (1429 kcals/8)      Nutrition Prescription Ordered    Current Diet Order: NPO  Nutrition Order Comments: Current on hold at this time  Current Nutrition Support Formula Ordered: Novasource Renal  Current Nutrition Support Rate Ordered: 25 (ml)  Current Nutrition Support Frequency Ordered: Continuous    Evaluation of Received  Nutrient/Fluid Intake  I/O: (Net since admit)   3/10: +4086.5 mL   3/13: +5079.5 mL     Enteral Calories (kcal): 0  Enteral Protein (gm): 0  Enteral (Free Water) Fluid (mL): 0  Free Water Flush Fluid (mL): 0  % Kcal Needs: 0%  % Protein Needs: 0%    Energy Calories Required: not meeting needs  Protein Required: not meeting needs  Fluid Required: not meeting needs  Total Fluid Intake (mL): 480  Tolerance: tolerating  % Intake of Estimated Energy Needs: 0 - 25 %  % Meal Intake: NPO    Nutrition Risk    Level of Risk/Frequency of Follow-up: high (F/u x 2 weekly)     Monitor and Evaluation    Food and Nutrient Intake: energy intake, enteral nutrition intake  Food and Nutrient Adminstration: enteral and parenteral nutrition administration  Anthropometric Measurements: weight, weight change, body mass index  Biochemical Data, Medical Tests and Procedures: electrolyte and renal panel, glucose/endocrine profile, inflammatory profile  Nutrition-Focused Physical Findings: overall appearance, extremities, muscles and bones, head and eyes, skin     Nutrition Follow-Up    RD Follow-up?: Yes  Micheline Zaldivar, Registration Eligible, Provisional LDN

## 2024-03-13 NOTE — SUBJECTIVE & OBJECTIVE
Interval History: Patient remains intubated breathing over the vent, no sedation on board with no significant neurologic changes in the setting of anoxic brain injury after cardiac arrest and prolonged down time in which neurology noted no chance for meaningful recovery. Met with patient's daughter/HCPOA and caregiver Elizabeth to discuss goals of care and code status: full code vs DNR/I along with risks, benefits, and alternatives. We also discussed in which I provided daughter with copy of patient's living will which noted that patient would not want  life prolonging measures continued greater than 5 days if no chance at meaningful recovery. Patient's daughter elected DNR/I code status to allow for natural and peaceful death. She noted that she is aware of her mother's wishes and will take her will home to further discuss with family in which we discussed comfort focused treatment, palliative extubation, and inpatient hospice and cessation of dialysis at that point. She requested follow up family meeting with PM team Friday at 2 PM in which she may likely transition patient to comfort focused treatment, will discuss decision and discussed the hope that Elizabeth and family will honor patient's wishes on her living will. All questions and concerns addressed. Primary team updated.     Past Medical History:   Diagnosis Date    Abnormal nuclear stress test 02/12/2024    Acute hypoxemic respiratory failure 11/26/2018    Anemia     Arthritis     back, hand, knees    Back pain     Cataract     CKD stage G4/A3, GFR 15 - 29 and albumin creatinine ratio >300 mg/g     GFR 40% Jan 2014 and 33% ub 3/2014 (BRG)    Coronary artery disease involving native coronary artery of native heart 11/30/2018    Diabetic retinopathy     DM (diabetes mellitus) type II controlled, neurological manifestation     Exudative age-related macular degeneration of left eye with active choroidal neovascularization 02/05/2019    GERD (gastroesophageal reflux  disease)     Glaucoma     Heart failure     Hyperlipidemia     Hypertension     Non-ST elevation MI (NSTEMI) 11/28/2018    NSTEMI (non-ST elevated myocardial infarction) 11/27/2018    Peripheral neuropathy     Polyneuropathy     Proteinuria     >6 mo     Seizures     BRG 1/2014 -dick CT NRI showed small vessel    Skin ulcer of toe of right foot with fat layer exposed 10/14/2022    Stroke 2012,2014    Tobacco dependence     Type 2 diabetes with peripheral circulatory disorder, controlled        Past Surgical History:   Procedure Laterality Date    ANGIOGRAM, LOWER ARTERIAL, UNILATERAL  2/13/2024    Procedure: Angiogram, Lower Arterial, Unilateral;  Surgeon: Michelle Holman MD;  Location: [facility] CATH LAB;  Service: Cardiology;;    ARTERIOGRAPHY OF SUBCLAVIAN ARTERY  2/13/2024    Procedure: ARTERIOGRAM, SUBCLAVIAN;  Surgeon: Michelle Holman MD;  Location: [facility] CATH LAB;  Service: Cardiology;;    BREAST LUMPECTOMY Left     benign, when patient was 15 years old    BREAST MASS EXCISION      ,benign, age 15.    CATHETERIZATION OF BOTH LEFT AND RIGHT HEART N/A 11/28/2018    Procedure: CATHETERIZATION, HEART, BOTH LEFT AND RIGHT;  Surgeon: Michelle Holman MD;  Location: [facility] CATH LAB;  Service: Cardiology;  Laterality: N/A;    CATHETERIZATION OF BOTH LEFT AND RIGHT HEART N/A 11/30/2018    Procedure: CATHETERIZATION, HEART, BOTH LEFT AND RIGHT;  Surgeon: Michelle Holman MD;  Location: [facility] CATH LAB;  Service: Cardiology;  Laterality: N/A;    CORONARY ARTERY BYPASS GRAFT      CORONARY ARTERY BYPASS GRAFT (CABG) N/A 7/20/2022    Procedure: CORONARY ARTERY BYPASS GRAFT (CABG);  Surgeon: Sanju Locke MD;  Location: [facility] OR;  Service: Cardiothoracic;  Laterality: N/A;  2-VESSEL PUMP ASSIST WITH EPI-AORTIC ULTRASOUND    CORONARY BYPASS GRAFT ANGIOGRAPHY  2/13/2024    Procedure: Bypass graft study;  Surgeon: Michelle Holman MD;  Location: [facility] CATH LAB;  Service: Cardiology;;    ENDOSCOPIC HARVEST OF VEIN Left 7/20/2022     Procedure: SURGICAL PROCUREMENT, VEIN, ENDOSCOPIC;  Surgeon: Sanju Locke MD;  Location: [facility] OR;  Service: Cardiothoracic;  Laterality: Left;    HYSTERECTOMY  1982    INJECTION OF ANESTHETIC AGENT AROUND MULTIPLE INTERCOSTAL NERVES N/A 7/20/2022    Procedure: BLOCK, NERVE, INTERCOSTAL, 2 OR MORE;  Surgeon: Sanju Locke MD;  Location: [facility] OR;  Service: Cardiothoracic;  Laterality: N/A;  PARASTERNAL NERVE BLOCK    INSERTION OF SHUNT      LEFT HEART CATHETERIZATION Left 12/3/2018    Procedure: CATHETERIZATION, HEART, LEFT;  Surgeon: Michelle Holman MD;  Location: [facility] CATH LAB;  Service: Cardiology;  Laterality: Left;  LAD ATHERECTOMY STENT/ FEMORAL APPROACH    LEFT HEART CATHETERIZATION Left 7/13/2022    Procedure: CATHETERIZATION, HEART, LEFT;  Surgeon: Abhi Sloan MD;  Location: [facility] CATH LAB;  Service: Cardiology;  Laterality: Left;    LEFT HEART CATHETERIZATION Left 2/13/2024    Procedure: Left heart cath;  Surgeon: Michelle Holman MD;  Location: [facility] CATH LAB;  Service: Cardiology;  Laterality: Left;    OOPHORECTOMY      RIGHT HEART CATHETERIZATION Right 2/13/2024    Procedure: INSERTION, CATHETER, RIGHT HEART;  Surgeon: Michelle Holman MD;  Location: [facility] CATH LAB;  Service: Cardiology;  Laterality: Right;    TRANSESOPHAGEAL ECHOCARDIOGRAPHY N/A 3/7/2024    Procedure: ECHOCARDIOGRAM, TRANSESOPHAGEAL;  Surgeon: Colton Abreu MD;  Location: [facility] CATH LAB;  Service: Cardiology;  Laterality: N/A;    wrist cyst Right 1980s    dorsal wrist cyst       Review of patient's allergies indicates:   Allergen Reactions    Norvasc [amlodipine] Shortness Of Breath    Codeine Swelling     Other Reaction(s): Itchy skin eruption (code-30039122,SNOMED-CT)    Propoxyphene Swelling     Other Reaction(s): Itchy skin eruption (code-72465104,SNOMED-CT)    Atorvastatin      Muscle twitching       Medications:  Continuous Infusions:  Scheduled Meds:   amiodarone  200 mg Per OG tube BID    chlorhexidine  15 mL  Mouth/Throat BID    fentaNYL  25 mcg Intravenous Once    heparin (porcine)  5,000 Units Subcutaneous Q8H    levETIRAcetam (Keppra) IV (PEDS and ADULTS)  1,000 mg Intravenous QHS    pantoprazole  40 mg Intravenous Daily    polyethylene glycol  17 g Per NG tube Daily     PRN Meds:acetaminophen, dextrose 10%, dextrose 10%, fentaNYL, glucagon (human recombinant), insulin aspart U-100, sodium chloride 0.9%, sodium chloride 0.9%, sodium chloride 0.9%    Family History       Problem Relation (Age of Onset)    Cancer Maternal Grandmother    Diabetes Mother    Heart disease Mother    Hyperlipidemia Mother    Hypertension Mother    Muscular dystrophy Son          Tobacco Use    Smoking status: Former     Current packs/day: 0.00     Average packs/day: 1.5 packs/day for 30.0 years (45.0 ttl pk-yrs)     Types: Cigarettes     Start date: 1960     Quit date: 1990     Years since quittin.1    Smokeless tobacco: Former   Substance and Sexual Activity    Alcohol use: No     Alcohol/week: 0.0 standard drinks of alcohol    Drug use: No    Sexual activity: Not Currently       Review of Systems   Unable to perform ROS: Intubated     Objective:     Vital Signs (Most Recent):  Temp: 97.9 °F (36.6 °C) (24 1200)  Pulse: 86 (24 1200)  Resp: (!) 24 (24 1200)  BP: (!) 125/58 (24 1200)  SpO2: 99 % (24 1200) Vital Signs (24h Range):  Temp:  [96.8 °F (36 °C)-99.1 °F (37.3 °C)] 97.9 °F (36.6 °C)  Pulse:  [68-86] 86  Resp:  [14-26] 24  SpO2:  [97 %-100 %] 99 %  BP: ()/(50-79) 125/58     Weight: 63 kg (138 lb 14.2 oz)  Body mass index is 23.11 kg/m².       Physical Exam  Vitals and nursing note reviewed.   Constitutional:       Appearance: She is ill-appearing.      Comments: Intubated, no sedation on board, no response to voice   HENT:      Head: Normocephalic.      Nose: Nose normal.      Mouth/Throat:      Mouth: Mucous membranes are dry.   Eyes:      General:         Right eye: No discharge.          Left eye: No discharge.   Cardiovascular:      Rate and Rhythm: Normal rate.   Pulmonary:      Comments: Intubated, over breathing the vent per ICU team    Abdominal:      Palpations: Abdomen is soft.   Musculoskeletal:         General: Swelling present.      Right lower leg: Edema present.      Left lower leg: Edema present.   Skin:     General: Skin is warm and dry.   Neurological:      Comments: Faint posturing  per ICU team, weak cough            Review of Symptoms      Symptom Assessment (ESAS 0-10 Scale)  Pain:  0  Dyspnea:  0  Anxiety:  0  Nausea:  0  Depression:  0  Anorexia:  0  Fatigue:  0  Insomnia:  0  Restlessness:  0  Agitation:  0         Performance Status:  10    Living Arrangements:  Lives with family    Psychosocial/Cultural:   See Palliative Psychosocial Note: No  Patient's daughter Elizabeth and her sister Myriam PATTERSON.   **Primary  to Follow**  Palliative Care  Consult: No        Advance Care Planning   Advance Directives:   Living Will: Yes        Copy on chart: Yes    Do Not Resuscitate Status: Yes    Medical Power of : Yes      Decision Making:  Family answered questions and Patient unable to communicate due to disease severity/cognitive impairment  Goals of Care: The family and healthcare power of   endorses that what is most important right now is to focus on extending life as long as possible, even it it means sacrificing quality    Accordingly, we have decided that the best plan to meet the patient's goals includes continuing with treatment with continued GOC discussions including comfort focused tx, palliative extubation and inpatient hospice in regards to honoring patient's wishes per her living will of not having life prolonging measures continued greater than 5 days if no chance at meaningful recovery, provided daughter with copy of living will to review.            Significant Labs: All pertinent labs within the past 24 hours have been  reviewed.  CBC:   Recent Labs   Lab 03/13/24 0412   WBC 9.12   HGB 9.0*   HCT 26.3*   MCV 93        BMP:  Recent Labs   Lab 03/13/24 0412   *   *   K 3.9   CL 93*   CO2 20*   BUN 87*   CREATININE 8.2*   CALCIUM 9.4   MG 2.3     LFT:  Lab Results   Component Value Date    AST 56 (H) 03/11/2024    ALKPHOS 208 (H) 03/11/2024    BILITOT 0.4 03/11/2024     Albumin:   Albumin   Date Value Ref Range Status   03/11/2024 2.5 (L) 3.5 - 5.2 g/dL Final     Protein:   Total Protein   Date Value Ref Range Status   03/11/2024 5.4 (L) 6.0 - 8.4 g/dL Final     Lactic acid:   Lab Results   Component Value Date    LACTATE 1.8 03/08/2024    LACTATE 2.5 (H) 03/07/2024       Significant Imaging: I have reviewed all pertinent imaging results/findings within the past 24 hours.

## 2024-03-13 NOTE — PLAN OF CARE
Pt GCS 3. SPO2 99% on vent settings AC/VC, FiO2 25%, rate 14, Vt 360, and PEEP 5. HR NSR and BP normotensive throughout the shift. OG tube in place with Novosource Renal TF at goal rate of 25 cc/hr continued. No BM occurred during this shift. Pfeiffer in place with total UOP of 46 mL during this shift. POC reviewed with family. Bed in lowest position, side rails up x 3, wheels locked, and alarms on and audible.

## 2024-03-13 NOTE — ASSESSMENT & PLAN NOTE
-Code status: DNR/I.    -HCPOA: Patient's daughter Elizabeth Monzon and her sister Myriam Don HCPOA.   -GOC: Focus on extending life as long as possible, even it it means sacrificing quality.Accordingly, we have decided that the best plan to meet the patient's goals includes continuing with treatment with continued GOC discussions including comfort focused tx, palliative extubation and inpatient hospice in regards to honoring patient's wishes per her living will of not having life prolonging measures continued greater than 5 days if no chance at meaningful recovery, provided daughter with copy of living will to review.   -Family meeting planned to follow up on Friday at 2PM.   -See HPI for further details

## 2024-03-13 NOTE — ASSESSMENT & PLAN NOTE
-Patient remains intubated breathing over the vent, no sedation on board with no significant neurologic changes with anoxic brain injury in which neurology noted no chance for meaningful recovery. Patient with ESRD on HD as well and CHF. Patient with grave prognosis. Daughter reported functional and medical decline prior to EUNICE.   -Discussed goals of care which is to continue tx at this time, discuss comfort focused treatment, patient's wishes per her living will of not having life prolonging measures if no change of recovery with plans to follow up with family meeting Friday to discuss transition to comfort treatment.   -DNR/I

## 2024-03-13 NOTE — PROGRESS NOTES
[facility] - Intensive Care ([facility])  Critical Care Medicine  Progress Note    Patient Name: Avril Monzon  MRN: [id number]  Admission Date: 3/7/2024  Hospital Length of Stay: 6 days  Code Status: Full Code  Attending Provider: Sebastien Olivo MD  Primary Care Provider: Rose Germain MD   Principal Problem: Cardiac arrest    Subjective:     HPI:  79yr old with CMP ef 45%, Severe pulmonary HTN PAP > 85, Valvular heart disease, DM, ESRD, CVA, Retinopathy was getting outpatient EUNICE.  She was given diprivan for the procedure and post procedure remained unresponsive, then during EKG went from tachycardia to asystole.   She was given one roud of Epi, several boluses of vasopressors by anesthesia and was resuscitated for a few minutes. Intubated and bough to ICU.   In ICU CVL was placed and she was placed on levo. She had two more VT / VF episodes requiring CPR, Amiodarone bolus was given in the previous arrest and amiodarone infusion was initiated.   An A line was placed and CT head and other orders were placed. Family was updated by Cardiology. She was also given Ca and bicarbonate times 2 during resuscitation.     Hospital/ICU Course:  3/8. ABG reviewed. Settings changed. Myoclonic activity. Suspect a longer arrest duration in recovery. Tele Neuro consult, Cont EEG and other test pending.  3/9: remains neurologically devastated on the vent, family request meeting at 1600 today, continue supportive care   3/10: no meaningful recovery from a neuro standpoint, remains off sedation on the vent, supportive care   3/11: neuro exam unchanged.  Vent and hemodynamics are stable.  3/12: Has cough today.  No withdrawal to pain.  Vent and hemodynamics stable.  3/13: weak cough, faint posturing bilaterally. Vent and hemodynamics stable.    Interval History: No acute events overnight.  Unable to dialyze yesterday due to difficulty with fistula access.  Neuro exam no better.      Objective:     Vital Signs (Most Recent):  Temp:  97.9 °F (36.6 °C) (03/13/24 0600)  Pulse: 76 (03/13/24 0600)  Resp: 15 (03/13/24 0600)  BP: (!) 107/54 (03/13/24 0600)  SpO2: 98 % (03/13/24 0600) Vital Signs (24h Range):  Temp:  [96.8 °F (36 °C)-99.1 °F (37.3 °C)] 97.9 °F (36.6 °C)  Pulse:  [66-93] 76  Resp:  [8-28] 15  SpO2:  [93 %-100 %] 98 %  BP: ()/(50-92) 107/54     Weight: 63.5 kg (139 lb 15.9 oz)  Body mass index is 23.3 kg/m².      Intake/Output Summary (Last 24 hours) at 3/13/2024 0733  Last data filed at 3/13/2024 0600  Gross per 24 hour   Intake 480 ml   Output 91 ml   Net 389 ml        Physical Exam  Vitals and nursing note reviewed.   Constitutional:       General: She is not in acute distress.     Comments: Intubated, no sedation, no response to voice   Cardiovascular:      Rate and Rhythm: Normal rate and regular rhythm.      Pulses: Normal pulses.      Heart sounds: No murmur heard.  Pulmonary:      Effort: Pulmonary effort is normal. No respiratory distress.      Breath sounds: No wheezing, rhonchi or rales.   Abdominal:      General: Abdomen is flat. There is no distension.      Palpations: Abdomen is soft.      Tenderness: There is no abdominal tenderness.   Musculoskeletal:      Right lower leg: Edema present.      Left lower leg: Edema present.   Neurological:      Comments: Weak cough, L pupil sluggish, faint posturing BUE, no movement in the legs, overbreathing the vent           Review of Systems    Vents:  Vent Mode: A/C (03/13/24 0514)  Set Rate: 14 BPM (03/13/24 0514)  Vt Set: 360 mL (03/13/24 0514)  Pressure Support: 7 cmH20 (03/11/24 0725)  PEEP/CPAP: 5 cmH20 (03/13/24 0514)  Oxygen Concentration (%): 25 (03/13/24 0600)  Peak Airway Pressure: 21 cmH20 (03/13/24 0514)  Plateau Pressure: 20 cmH20 (03/13/24 0514)  Total Ve: 6.38 L/m (03/13/24 0514)  Negative Inspiratory Force (cm H2O): 0 (03/13/24 0514)  F/VT Ratio<105 (RSBI): (!) 48.65 (03/13/24 0514)    Lines/Drains/Airways       Central Venous Catheter Line  Duration              Percutaneous Central Line - Triple Lumen  03/07/24 1347 Internal Jugular Right 5 days              Drain  Duration                  NG/OG Tube 03/07/24 1847 Center mouth 5 days         Urethral Catheter 03/07/24 1847 5 days              Airway  Duration                  Airway - Non-Surgical 03/07/24 1259 5 days         Airway - Non-Surgical 03/07/24 1300 Endotracheal Tube 5 days              Peripheral Intravenous Line  Duration                  Hemodialysis AV Fistula 04/01/20 2203 Left upper arm 1441 days                    Significant Labs:    CBC/Anemia Profile:  Recent Labs   Lab 03/12/24  0415 03/13/24  0412   WBC 8.50 9.12   HGB 9.2* 9.0*   HCT 26.9* 26.3*    185   MCV 94 93   RDW 14.5 14.6*        Chemistries:  Recent Labs   Lab 03/12/24  0415 03/13/24  0412    133*   K 3.8 3.9   CL 92* 93*   CO2 20* 20*   BUN 78* 87*   CREATININE 8.0* 8.2*   CALCIUM 9.2 9.4   MG 2.2 2.3       All pertinent labs within the past 24 hours have been reviewed.    Significant Imaging:  I have reviewed all pertinent imaging results/findings within the past 24 hours.    ABG  Recent Labs   Lab 03/13/24  0355   PH 7.422   PO2 95   PCO2 35.9   HCO3 23.4*   BE -1     Assessment/Plan:     Neuro  Anoxic brain injury  - s/p cardiac arrest and suspected prolonged down time   - EEG with myoclonus and neuro eval done, continue keppra   - neuro checks, supportive care, avoid fevers   - Repeat CT without acute findings, though limited study  - grave prognosis  - palliative care consulted, ongoing family discussions     Myoclonic jerking  - see plan for anoxic injury    Cardiac/Vascular  * Cardiac arrest  - cardiology following  - supportive care  - avoid fevers   - see plan for anoxia     Acute on chronic combined systolic and diastolic heart failure  - per cards  - monitor I&Os    Pulmonary hypertension  - severe PAP 88mmHg  - contributing to and complicating medical decision making    Renal/  ESRD (end stage renal disease)  -  nephro following   - RRT per Nephro  - renally dose meds and avoid nephrotoxins    Endocrine  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, with long-term current use of insulin  - SSI/Accuchecks    Palliative Care  Goals of care, counseling/discussion  - continue discussions and provide support        Critical Care Time: 35 minutes  Critical secondary to respiratory failure      Critical care was time spent personally by me on the following activities: development of treatment plan with patient or surrogate and bedside caregivers, discussions with consultants, evaluation of patient's response to treatment, examination of patient, ordering and performing treatments and interventions, ordering and review of laboratory studies, ordering and review of radiographic studies, pulse oximetry, re-evaluation of patient's condition. This critical care time did not overlap with that of any other provider or involve time for any procedures.     Sebastien Olivo MD  Critical Care Medicine  [facility] - Intensive Care ([facility])

## 2024-03-13 NOTE — CONSULTS
[facility] - Int[facility] Care (Hospital)  Palliative Medicine  Consult Note    Patient Name: Avril Monzon  MRN: [id number]  Admission Date: 3/7/2024  Hospital Length of Stay: 6 days  Code Status: DNR   Attending Provider: Sebastien Olivo MD  Consulting Provider: Carissa Blanca NP  Primary Care Physician: Rose Germain MD  Principal Problem:Cardiac arrest    Patient information was obtained from relative(s), past medical records, and primary team.      Inpatient consult to Palliative Care  Consult performed by: Carissa Blanca NP  Consult ordered by: Jian Flowers NP        Assessment/Plan:     Cardiac/Vascular  * Cardiac arrest  -Patient remains intubated breathing over the vent, no sedation on board with no significant neurologic changes with anoxic brain injury in which neurology noted no chance for meaningful recovery. Patient with ESRD on HD as well and CHF. Patient with grave prognosis. Daughter reported functional and medical decline prior to EUNICE.   -Discussed goals of care which is to continue tx at this time, discuss comfort focused treatment, patient's wishes per her living will of not having life prolonging measures if no change of recovery with plans to follow up with family meeting Friday to discuss transition to comfort treatment.   -DNR/I    Palliative Care  Encounter for palliative care  -Code status: DNR/I.    -HCPOA: Patient's daughter Elizabeth Monzon and her sister Myriam Don HCPOA.   -GOC: Focus on extending life as long as possible, even it it means sacrificing quality.Accordingly, we have decided that the best plan to meet the patient's goals includes continuing with treatment with continued GOC discussions including comfort focused tx, palliative extubation and inpatient hospice in regards to honoring patient's wishes per her living will of not having life prolonging measures continued greater than 5 days if no chance at meaningful recovery, provided daughter with copy of living  will to review.   -Family meeting planned to follow up on Friday at 2PM.   -See HPI for further details          Thank you for your consult. I will follow-up with patient. Please contact us if you have any additional questions.    Subjective:     HPI:   Mrs. Monzon is a 79yr old female with CMP ef 45%, Severe pulmonary HTN PAP > 85, Valvular heart disease, DM, ESRD, CVA, Retinopathy who presented to hospital for outpatient EUNICE. She was given diprivan for the procedure and post procedure remained unresponsive, then during EKG went from tachycardia to asystole. She was given one roud of Epi, several boluses of vasopressors by anesthesia and was resuscitated for a few minutes. Intubated and bough to ICU. In ICU CVL was placed and she was placed on levo. She had two more VT / VF episodes requiring CPR, Amiodarone bolus was given in the previous arrest and amiodarone infusion was initiated. An A line was placed and CT head and other orders were placed. Family was updated by Cardiology. She was also given Ca and bicarbonate times 2 during resuscitation. During hospital coarse, it was suspected a longer arrest duration in recovery in which neruo was consulted and EEG was done. Patient noted to remain neurologically devastated on the vent in which neurology noted no meaningful recovery. Patient remains off sedation with no significant changes Now noted with weak cough, faint posturing bilaterally. Nephrology following and patient was unable to dialyze yesterday due to difficulty with fistula access, patient did undergo dialysis on today. ICU team provided daughter/YESENIA Johnson with updates, palliative medicine consulted for goals of care and advance care planning.          Hospital Course:  No notes on file    Interval History: Patient remains intubated breathing over the vent, no sedation on board with no significant neurologic changes in the setting of anoxic brain injury after cardiac arrest and prolonged down time in  which neurology noted no chance for meaningful recovery. Met with patient's daughter/HCPOA and caregiver Elizabeth to discuss goals of care and code status: full code vs DNR/I along with risks, benefits, and alternatives. We also discussed in which I provided daughter with copy of patient's living will which noted that patient would not want  life prolonging measures continued greater than 5 days if no chance at meaningful recovery. Patient's daughter elected DNR/I code status to allow for natural and peaceful death. She noted that she is aware of her mother's wishes and will take her will home to further discuss with family in which we discussed comfort focused treatment, palliative extubation, and inpatient hospice and cessation of dialysis at that point. She requested follow up family meeting with PM team Friday at 2 PM in which she may likely transition patient to comfort focused treatment, will discuss decision and discussed the hope that Elizabeth and family will honor patient's wishes on her living will. All questions and concerns addressed. Primary team updated.     Past Medical History:   Diagnosis Date    Abnormal nuclear stress test 02/12/2024    Acute hypoxemic respiratory failure 11/26/2018    Anemia     Arthritis     back, hand, knees    Back pain     Cataract     CKD stage G4/A3, GFR 15 - 29 and albumin creatinine ratio >300 mg/g     GFR 40% Jan 2014 and 33% ub 3/2014 ([facility])    Coronary artery disease involving native coronary artery of native heart 11/30/2018    Diabetic retinopathy     DM (diabetes mellitus) type II controlled, neurological manifestation     Exudative age-related macular degeneration of left eye with active choroidal neovascularization 02/05/2019    GERD (gastroesophageal reflux disease)     Glaucoma     Heart failure     Hyperlipidemia     Hypertension     Non-ST elevation MI (NSTEMI) 11/28/2018    NSTEMI (non-ST elevated myocardial infarction) 11/27/2018    Peripheral neuropathy      Polyneuropathy     Proteinuria     >6 mo     Seizures     BRG 1/2014 -dick CT NRI showed small vessel    Skin ulcer of toe of right foot with fat layer exposed 10/14/2022    Stroke 2012,2014    Tobacco dependence     Type 2 diabetes with peripheral circulatory disorder, controlled        Past Surgical History:   Procedure Laterality Date    ANGIOGRAM, LOWER ARTERIAL, UNILATERAL  2/13/2024    Procedure: Angiogram, Lower Arterial, Unilateral;  Surgeon: Michelle Holman MD;  Location: [facility] CATH LAB;  Service: Cardiology;;    ARTERIOGRAPHY OF SUBCLAVIAN ARTERY  2/13/2024    Procedure: ARTERIOGRAM, SUBCLAVIAN;  Surgeon: Michelle Holman MD;  Location: [facility] CATH LAB;  Service: Cardiology;;    BREAST LUMPECTOMY Left     benign, when patient was 15 years old    BREAST MASS EXCISION      ,benign, age 15.    CATHETERIZATION OF BOTH LEFT AND RIGHT HEART N/A 11/28/2018    Procedure: CATHETERIZATION, HEART, BOTH LEFT AND RIGHT;  Surgeon: Michelle Holman MD;  Location: [facility] CATH LAB;  Service: Cardiology;  Laterality: N/A;    CATHETERIZATION OF BOTH LEFT AND RIGHT HEART N/A 11/30/2018    Procedure: CATHETERIZATION, HEART, BOTH LEFT AND RIGHT;  Surgeon: Michelle Holman MD;  Location: [facility] CATH LAB;  Service: Cardiology;  Laterality: N/A;    CORONARY ARTERY BYPASS GRAFT      CORONARY ARTERY BYPASS GRAFT (CABG) N/A 7/20/2022    Procedure: CORONARY ARTERY BYPASS GRAFT (CABG);  Surgeon: Sanju Locke MD;  Location: [facility];  Service: Cardiothoracic;  Laterality: N/A;  2-VESSEL PUMP ASSIST WITH EPI-AORTIC ULTRASOUND    CORONARY BYPASS GRAFT ANGIOGRAPHY  2/13/2024    Procedure: Bypass graft study;  Surgeon: Michelle Holman MD;  Location: [facility] CATH LAB;  Service: Cardiology;;    ENDOSCOPIC HARVEST OF VEIN Left 7/20/2022    Procedure: SURGICAL PROCUREMENT, VEIN, ENDOSCOPIC;  Surgeon: Sanju Locke MD;  Location: [facility] OR;  Service: Cardiothoracic;  Laterality: Left;    HYSTERECTOMY  1982    INJECTION OF ANESTHETIC AGENT AROUND  MULTIPLE INTERCOSTAL NERVES N/A 7/20/2022    Procedure: BLOCK, NERVE, INTERCOSTAL, 2 OR MORE;  Surgeon: Sanju Locke MD;  Location: [facility] OR;  Service: Cardiothoracic;  Laterality: N/A;  PARASTERNAL NERVE BLOCK    INSERTION OF SHUNT      LEFT HEART CATHETERIZATION Left 12/3/2018    Procedure: CATHETERIZATION, HEART, LEFT;  Surgeon: Michelle Holman MD;  Location: [facility] CATH LAB;  Service: Cardiology;  Laterality: Left;  LAD ATHERECTOMY STENT/ FEMORAL APPROACH    LEFT HEART CATHETERIZATION Left 7/13/2022    Procedure: CATHETERIZATION, HEART, LEFT;  Surgeon: Abhi Sloan MD;  Location: [facility] CATH LAB;  Service: Cardiology;  Laterality: Left;    LEFT HEART CATHETERIZATION Left 2/13/2024    Procedure: Left heart cath;  Surgeon: Michelle Holman MD;  Location: [facility] CATH LAB;  Service: Cardiology;  Laterality: Left;    OOPHORECTOMY      RIGHT HEART CATHETERIZATION Right 2/13/2024    Procedure: INSERTION, CATHETER, RIGHT HEART;  Surgeon: Michelle Holman MD;  Location: [facility] CATH LAB;  Service: Cardiology;  Laterality: Right;    TRANSESOPHAGEAL ECHOCARDIOGRAPHY N/A 3/7/2024    Procedure: ECHOCARDIOGRAM, TRANSESOPHAGEAL;  Surgeon: Colton Abreu MD;  Location: [facility] CATH LAB;  Service: Cardiology;  Laterality: N/A;    wrist cyst Right 1980s    dorsal wrist cyst       Review of patient's allergies indicates:   Allergen Reactions    Norvasc [amlodipine] Shortness Of Breath    Codeine Swelling     Other Reaction(s): Itchy skin eruption (code-[id number],SNOMED-CT)    Propoxyphene Swelling     Other Reaction(s): Itchy skin eruption (code-[id number],SNOMED-CT)    Atorvastatin      Muscle twitching       Medications:  Continuous Infusions:  Scheduled Meds:   amiodarone  200 mg Per OG tube BID    chlorhexidine  15 mL Mouth/Throat BID    fentaNYL  25 mcg Intravenous Once    heparin (porcine)  5,000 Units Subcutaneous Q8H    levETIRAcetam (Keppra) IV (PEDS and ADULTS)  1,000 mg Intravenous QHS    pantoprazole  40 mg Intravenous Daily     polyethylene glycol  17 g Per NG tube Daily     PRN Meds:acetaminophen, dextrose 10%, dextrose 10%, fentaNYL, glucagon (human recombinant), insulin aspart U-100, sodium chloride 0.9%, sodium chloride 0.9%, sodium chloride 0.9%    Family History       Problem Relation (Age of Onset)    Cancer Maternal Grandmother    Diabetes Mother    Heart disease Mother    Hyperlipidemia Mother    Hypertension Mother    Muscular dystrophy Son          Tobacco Use    Smoking status: Former     Current packs/day: 0.00     Average packs/day: 1.5 packs/day for 30.0 years (45.0 ttl pk-yrs)     Types: Cigarettes     Start date: 1960     Quit date: 1990     Years since quittin.1    Smokeless tobacco: Former   Substance and Sexual Activity    Alcohol use: No     Alcohol/week: 0.0 standard drinks of alcohol    Drug use: No    Sexual activity: Not Currently       Review of Systems   Unable to perform ROS: Intubated     Objective:     Vital Signs (Most Recent):  Temp: 97.9 °F (36.6 °C) (24 1200)  Pulse: 86 (24 1200)  Resp: (!) 24 (24 1200)  BP: (!) 125/58 (24 1200)  SpO2: 99 % (24 1200) Vital Signs (24h Range):  Temp:  [96.8 °F (36 °C)-99.1 °F (37.3 °C)] 97.9 °F (36.6 °C)  Pulse:  [68-86] 86  Resp:  [14-26] 24  SpO2:  [97 %-100 %] 99 %  BP: ()/(50-79) 125/58     Weight: 63 kg (138 lb 14.2 oz)  Body mass index is 23.11 kg/m².       Physical Exam  Vitals and nursing note reviewed.   Constitutional:       Appearance: She is ill-appearing.      Comments: Intubated, no sedation on board, no response to voice   HENT:      Head: Normocephalic.      Nose: Nose normal.      Mouth/Throat:      Mouth: Mucous membranes are dry.   Eyes:      General:         Right eye: No discharge.         Left eye: No discharge.   Cardiovascular:      Rate and Rhythm: Normal rate.   Pulmonary:      Comments: Intubated, over breathing the vent per ICU team    Abdominal:      Palpations: Abdomen is soft.    Musculoskeletal:         General: Swelling present.      Right lower leg: Edema present.      Left lower leg: Edema present.   Skin:     General: Skin is warm and dry.   Neurological:      Comments: Faint posturing  per ICU team, weak cough            Review of Symptoms      Symptom Assessment (ESAS 0-10 Scale)  Pain:  0  Dyspnea:  0  Anxiety:  0  Nausea:  0  Depression:  0  Anorexia:  0  Fatigue:  0  Insomnia:  0  Restlessness:  0  Agitation:  0         Performance Status:  10    Living Arrangements:  Lives with family    Psychosocial/Cultural:   See Palliative Psychosocial Note: No  Patient's daughter Elizabeth and her sister Myriam Don HCPOA.   **Primary  to Follow**  Palliative Care  Consult: No        Advance Care Planning  Advance Directives:   Living Will: Yes        Copy on chart: Yes    Do Not Resuscitate Status: Yes    Medical Power of : Yes      Decision Making:  Family answered questions and Patient unable to communicate due to disease severity/cognitive impairment  Goals of Care: The family and healthcare power of   endorses that what is most important right now is to focus on extending life as long as possible, even it it means sacrificing quality    Accordingly, we have decided that the best plan to meet the patient's goals includes continuing with treatment with continued GOC discussions including comfort focused tx, palliative extubation and inpatient hospice in regards to honoring patient's wishes per her living will of not having life prolonging measures continued greater than 5 days if no chance at meaningful recovery, provided daughter with copy of living will to review.            Significant Labs: All pertinent labs within the past 24 hours have been reviewed.  CBC:   Recent Labs   Lab 03/13/24 0412   WBC 9.12   HGB 9.0*   HCT 26.3*   MCV 93        BMP:  Recent Labs   Lab 03/13/24 0412   *   *   K 3.9   CL 93*   CO2 20*   BUN 87*    CREATININE 8.2*   CALCIUM 9.4   MG 2.3     LFT:  Lab Results   Component Value Date    AST 56 (H) 03/11/2024    ALKPHOS 208 (H) 03/11/2024    BILITOT 0.4 03/11/2024     Albumin:   Albumin   Date Value Ref Range Status   03/11/2024 2.5 (L) 3.5 - 5.2 g/dL Final     Protein:   Total Protein   Date Value Ref Range Status   03/11/2024 5.4 (L) 6.0 - 8.4 g/dL Final     Lactic acid:   Lab Results   Component Value Date    LACTATE 1.8 03/08/2024    LACTATE 2.5 (H) 03/07/2024       Significant Imaging: I have reviewed all pertinent imaging results/findings within the past 24 hours.    I spent face to face time and non-face to face time preparing to see the patient (eg, review of tests), Obtaining and/or reviewing separately obtained history, Documenting clinical information in the electronic or other health record, Independently interpreting results and communicating results to the patient/family/caregiver, or Care coordination.     > 46 minutes spent in discussing ACP    Carissa Blanca NP  Palliative Medicine  [facility] - Intensive Care ([facility])

## 2024-03-13 NOTE — PLAN OF CARE
Nutrition Recommendations 3/13/24:  1. Recommend pt continue on Enteral nutrition, Novasource renal via OG tube, goal rate 25 mL/hr, starting at 10 mL/hr then advance within 24 hrs if pt tolerating or per MD/NP  - Formula at goal rate will provide: 1200 kcal (95% EEN), 54 g Protein (86% EPN), 430 mL/day free formula water  - 80 mL FWF q6h (320 mL/day) = total from formula + FWF = 750 mL water/day, per MD/NP  - Monitor Mg, Na, K+, Phos, and Glu, correct as indicated  2. Recommend continue bowel regimen (No BM x 7 days)   3. If pt transitioned to comfort care and nutrition warranted, recommend providing pt with diet as desired and tolerated   4. Weight S/p dialysis   5. Collaboration with other providers     Goals:   1. Pt will be initiated onto EN within 24 hrs (Resolved)   2. Pt will consume and tolerate >50% of EEN and EPN prior to RD follow up  3. If on comfort care and nutrition warranted, pt will consume desired diet with safest texture during admit  4. Pt will have BM prior to RD follow up     Micheline Zaldivar, Registration Eligible, Provisional LDN

## 2024-03-13 NOTE — SUBJECTIVE & OBJECTIVE
Interval History: No acute events overnight.  Unable to dialyze yesterday due to difficulty with fistula access.  Neuro exam no better.      Objective:     Vital Signs (Most Recent):  Temp: 97.9 °F (36.6 °C) (03/13/24 0600)  Pulse: 76 (03/13/24 0600)  Resp: 15 (03/13/24 0600)  BP: (!) 107/54 (03/13/24 0600)  SpO2: 98 % (03/13/24 0600) Vital Signs (24h Range):  Temp:  [96.8 °F (36 °C)-99.1 °F (37.3 °C)] 97.9 °F (36.6 °C)  Pulse:  [66-93] 76  Resp:  [8-28] 15  SpO2:  [93 %-100 %] 98 %  BP: ()/(50-92) 107/54     Weight: 63.5 kg (139 lb 15.9 oz)  Body mass index is 23.3 kg/m².      Intake/Output Summary (Last 24 hours) at 3/13/2024 0733  Last data filed at 3/13/2024 0600  Gross per 24 hour   Intake 480 ml   Output 91 ml   Net 389 ml        Physical Exam  Vitals and nursing note reviewed.   Constitutional:       General: She is not in acute distress.     Comments: Intubated, no sedation, no response to voice   Cardiovascular:      Rate and Rhythm: Normal rate and regular rhythm.      Pulses: Normal pulses.      Heart sounds: No murmur heard.  Pulmonary:      Effort: Pulmonary effort is normal. No respiratory distress.      Breath sounds: No wheezing, rhonchi or rales.   Abdominal:      General: Abdomen is flat. There is no distension.      Palpations: Abdomen is soft.      Tenderness: There is no abdominal tenderness.   Musculoskeletal:      Right lower leg: Edema present.      Left lower leg: Edema present.   Neurological:      Comments: Weak cough, L pupil sluggish, faint posturing BUE, no movement in the legs, overbreathing the vent           Review of Systems    Vents:  Vent Mode: A/C (03/13/24 0514)  Set Rate: 14 BPM (03/13/24 0514)  Vt Set: 360 mL (03/13/24 0514)  Pressure Support: 7 cmH20 (03/11/24 0725)  PEEP/CPAP: 5 cmH20 (03/13/24 0514)  Oxygen Concentration (%): 25 (03/13/24 0600)  Peak Airway Pressure: 21 cmH20 (03/13/24 0514)  Plateau Pressure: 20 cmH20 (03/13/24 0514)  Total Ve: 6.38 L/m (03/13/24  0514)  Negative Inspiratory Force (cm H2O): 0 (03/13/24 0514)  F/VT Ratio<105 (RSBI): (!) 48.65 (03/13/24 0514)    Lines/Drains/Airways       Central Venous Catheter Line  Duration             Percutaneous Central Line - Triple Lumen  03/07/24 1347 Internal Jugular Right 5 days              Drain  Duration                  NG/OG Tube 03/07/24 1847 Center mouth 5 days         Urethral Catheter 03/07/24 1847 5 days              Airway  Duration                  Airway - Non-Surgical 03/07/24 1259 5 days         Airway - Non-Surgical 03/07/24 1300 Endotracheal Tube 5 days              Peripheral Intravenous Line  Duration                  Hemodialysis AV Fistula 04/01/20 2203 Left upper arm 1441 days                    Significant Labs:    CBC/Anemia Profile:  Recent Labs   Lab 03/12/24 0415 03/13/24 0412   WBC 8.50 9.12   HGB 9.2* 9.0*   HCT 26.9* 26.3*    185   MCV 94 93   RDW 14.5 14.6*        Chemistries:  Recent Labs   Lab 03/12/24 0415 03/13/24 0412    133*   K 3.8 3.9   CL 92* 93*   CO2 20* 20*   BUN 78* 87*   CREATININE 8.0* 8.2*   CALCIUM 9.2 9.4   MG 2.2 2.3       All pertinent labs within the past 24 hours have been reviewed.    Significant Imaging:  I have reviewed all pertinent imaging results/findings within the past 24 hours.

## 2024-03-13 NOTE — PLAN OF CARE
Duration: 3 hours    Stable/unstable: stable    Ultrafiltration volume: 1 liter net    Notes: Tolerated, No access issues, Dr. Ramirez visited.

## 2024-03-14 LAB
ANION GAP SERPL CALC-SCNC: 17 MMOL/L (ref 8–16)
BASOPHILS # BLD AUTO: 0.02 K/UL (ref 0–0.2)
BASOPHILS NFR BLD: 0.2 % (ref 0–1.9)
BUN SERPL-MCNC: 46 MG/DL (ref 8–23)
CALCIUM SERPL-MCNC: 9 MG/DL (ref 8.7–10.5)
CHLORIDE SERPL-SCNC: 96 MMOL/L (ref 95–110)
CO2 SERPL-SCNC: 24 MMOL/L (ref 23–29)
CREAT SERPL-MCNC: 5.3 MG/DL (ref 0.5–1.4)
DIFFERENTIAL METHOD BLD: ABNORMAL
EOSINOPHIL # BLD AUTO: 0 K/UL (ref 0–0.5)
EOSINOPHIL NFR BLD: 0.2 % (ref 0–8)
ERYTHROCYTE [DISTWIDTH] IN BLOOD BY AUTOMATED COUNT: 15 % (ref 11.5–14.5)
EST. GFR  (NO RACE VARIABLE): 8 ML/MIN/1.73 M^2
GLUCOSE SERPL-MCNC: 235 MG/DL (ref 70–110)
HCT VFR BLD AUTO: 25.6 % (ref 37–48.5)
HGB BLD-MCNC: 8.5 G/DL (ref 12–16)
IMM GRANULOCYTES # BLD AUTO: 0.09 K/UL (ref 0–0.04)
IMM GRANULOCYTES NFR BLD AUTO: 1 % (ref 0–0.5)
LYMPHOCYTES # BLD AUTO: 0.4 K/UL (ref 1–4.8)
LYMPHOCYTES NFR BLD: 4.6 % (ref 18–48)
MAGNESIUM SERPL-MCNC: 2.2 MG/DL (ref 1.6–2.6)
MCH RBC QN AUTO: 31.5 PG (ref 27–31)
MCHC RBC AUTO-ENTMCNC: 33.2 G/DL (ref 32–36)
MCV RBC AUTO: 95 FL (ref 82–98)
MONOCYTES # BLD AUTO: 0.7 K/UL (ref 0.3–1)
MONOCYTES NFR BLD: 7.6 % (ref 4–15)
NEUTROPHILS # BLD AUTO: 8 K/UL (ref 1.8–7.7)
NEUTROPHILS NFR BLD: 86.4 % (ref 38–73)
NRBC BLD-RTO: 0 /100 WBC
PHOSPHATE SERPL-MCNC: 3.5 MG/DL (ref 2.7–4.5)
PLATELET # BLD AUTO: 186 K/UL (ref 150–450)
PMV BLD AUTO: 9.6 FL (ref 9.2–12.9)
POCT GLUCOSE: 224 MG/DL (ref 70–110)
POCT GLUCOSE: 244 MG/DL (ref 70–110)
POCT GLUCOSE: 252 MG/DL (ref 70–110)
POCT GLUCOSE: 276 MG/DL (ref 70–110)
POCT GLUCOSE: 314 MG/DL (ref 70–110)
POTASSIUM SERPL-SCNC: 3.9 MMOL/L (ref 3.5–5.1)
RBC # BLD AUTO: 2.7 M/UL (ref 4–5.4)
SODIUM SERPL-SCNC: 137 MMOL/L (ref 136–145)
WBC # BLD AUTO: 9.3 K/UL (ref 3.9–12.7)

## 2024-03-14 PROCEDURE — 27100171 HC OXYGEN HIGH FLOW UP TO 24 HOURS: Mod: HCNC

## 2024-03-14 PROCEDURE — 99900026 HC AIRWAY MAINTENANCE (STAT): Mod: HCNC

## 2024-03-14 PROCEDURE — 84100 ASSAY OF PHOSPHORUS: CPT | Mod: HCNC | Performed by: NURSE PRACTITIONER

## 2024-03-14 PROCEDURE — 25000003 PHARM REV CODE 250: Mod: HCNC | Performed by: SPECIALIST

## 2024-03-14 PROCEDURE — 94003 VENT MGMT INPAT SUBQ DAY: CPT | Mod: HCNC

## 2024-03-14 PROCEDURE — 99232 SBSQ HOSP IP/OBS MODERATE 35: CPT | Mod: HCNC,,, | Performed by: INTERNAL MEDICINE

## 2024-03-14 PROCEDURE — C9113 INJ PANTOPRAZOLE SODIUM, VIA: HCPCS | Mod: HCNC | Performed by: NURSE PRACTITIONER

## 2024-03-14 PROCEDURE — 99900035 HC TECH TIME PER 15 MIN (STAT): Mod: HCNC

## 2024-03-14 PROCEDURE — 63600175 PHARM REV CODE 636 W HCPCS: Mod: HCNC | Performed by: NURSE PRACTITIONER

## 2024-03-14 PROCEDURE — 85025 COMPLETE CBC W/AUTO DIFF WBC: CPT | Mod: HCNC | Performed by: NURSE PRACTITIONER

## 2024-03-14 PROCEDURE — 25000003 PHARM REV CODE 250: Mod: HCNC | Performed by: NURSE PRACTITIONER

## 2024-03-14 PROCEDURE — 63600175 PHARM REV CODE 636 W HCPCS: Mod: HCNC | Performed by: INTERNAL MEDICINE

## 2024-03-14 PROCEDURE — 94761 N-INVAS EAR/PLS OXIMETRY MLT: CPT | Mod: HCNC

## 2024-03-14 PROCEDURE — 83735 ASSAY OF MAGNESIUM: CPT | Mod: HCNC | Performed by: NURSE PRACTITIONER

## 2024-03-14 PROCEDURE — 63600175 PHARM REV CODE 636 W HCPCS: Mod: HCNC | Performed by: SPECIALIST

## 2024-03-14 PROCEDURE — 25000003 PHARM REV CODE 250: Mod: HCNC | Performed by: INTERNAL MEDICINE

## 2024-03-14 PROCEDURE — 80048 BASIC METABOLIC PNL TOTAL CA: CPT | Mod: HCNC | Performed by: NURSE PRACTITIONER

## 2024-03-14 PROCEDURE — 20000000 HC ICU ROOM: Mod: HCNC

## 2024-03-14 RX ADMIN — POLYETHYLENE GLYCOL 3350 17 G: 17 POWDER, FOR SOLUTION ORAL at 08:03

## 2024-03-14 RX ADMIN — AMIODARONE HYDROCHLORIDE 200 MG: 200 TABLET ORAL at 08:03

## 2024-03-14 RX ADMIN — INSULIN ASPART 4 UNITS: 100 INJECTION, SOLUTION INTRAVENOUS; SUBCUTANEOUS at 08:03

## 2024-03-14 RX ADMIN — INSULIN ASPART 4 UNITS: 100 INJECTION, SOLUTION INTRAVENOUS; SUBCUTANEOUS at 05:03

## 2024-03-14 RX ADMIN — LEVETIRACETAM INJECTION 1000 MG: 10 INJECTION INTRAVENOUS at 09:03

## 2024-03-14 RX ADMIN — INSULIN ASPART 6 UNITS: 100 INJECTION, SOLUTION INTRAVENOUS; SUBCUTANEOUS at 12:03

## 2024-03-14 RX ADMIN — AMIODARONE HYDROCHLORIDE 200 MG: 200 TABLET ORAL at 09:03

## 2024-03-14 RX ADMIN — PANTOPRAZOLE SODIUM 40 MG: 40 INJECTION, POWDER, FOR SOLUTION INTRAVENOUS at 08:03

## 2024-03-14 RX ADMIN — CHLORHEXIDINE GLUCONATE 0.12% ORAL RINSE 15 ML: 1.2 LIQUID ORAL at 09:03

## 2024-03-14 RX ADMIN — HEPARIN SODIUM 5000 UNITS: 5000 INJECTION INTRAVENOUS; SUBCUTANEOUS at 02:03

## 2024-03-14 RX ADMIN — INSULIN DETEMIR 10 UNITS: 100 INJECTION, SOLUTION SUBCUTANEOUS at 11:03

## 2024-03-14 RX ADMIN — HEPARIN SODIUM 5000 UNITS: 5000 INJECTION INTRAVENOUS; SUBCUTANEOUS at 05:03

## 2024-03-14 RX ADMIN — CHLORHEXIDINE GLUCONATE 0.12% ORAL RINSE 15 ML: 1.2 LIQUID ORAL at 08:03

## 2024-03-14 RX ADMIN — INSULIN ASPART 2 UNITS: 100 INJECTION, SOLUTION INTRAVENOUS; SUBCUTANEOUS at 09:03

## 2024-03-14 RX ADMIN — INSULIN ASPART 6 UNITS: 100 INJECTION, SOLUTION INTRAVENOUS; SUBCUTANEOUS at 05:03

## 2024-03-14 RX ADMIN — HEPARIN SODIUM 5000 UNITS: 5000 INJECTION INTRAVENOUS; SUBCUTANEOUS at 09:03

## 2024-03-14 NOTE — PLAN OF CARE
Problem: Adult Inpatient Plan of Care  Goal: Plan of Care Review  Outcome: Ongoing, Not Progressing  Goal: Patient-Specific Goal (Individualized)  Outcome: Ongoing, Not Progressing  Goal: Absence of Hospital-Acquired Illness or Injury  Outcome: Ongoing, Not Progressing  Goal: Optimal Comfort and Wellbeing  Outcome: Ongoing, Not Progressing  Goal: Readiness for Transition of Care  Outcome: Ongoing, Not Progressing     Problem: Diabetes Comorbidity  Goal: Blood Glucose Level Within Targeted Range  Outcome: Ongoing, Not Progressing     Problem: Infection  Goal: Absence of Infection Signs and Symptoms  Outcome: Ongoing, Not Progressing     Problem: Fall Injury Risk  Goal: Absence of Fall and Fall-Related Injury  Outcome: Ongoing, Not Progressing     Problem: Restraint, Nonbehavioral (Nonviolent)  Goal: Absence of Harm or Injury  Outcome: Ongoing, Not Progressing     Problem: Skin Injury Risk Increased  Goal: Skin Health and Integrity  Outcome: Ongoing, Not Progressing     Problem: Coping Ineffective  Goal: Effective Coping  Outcome: Ongoing, Not Progressing     Problem: Device-Related Complication Risk (Hemodialysis)  Goal: Safe, Effective Therapy Delivery  Outcome: Ongoing, Not Progressing     Problem: Hemodynamic Instability (Hemodialysis)  Goal: Effective Tissue Perfusion  Outcome: Ongoing, Not Progressing     Problem: Communication Impairment (Mechanical Ventilation, Invasive)  Goal: Effective Communication  Outcome: Ongoing, Not Progressing     Problem: Device-Related Complication Risk (Mechanical Ventilation, Invasive)  Goal: Optimal Device Function  Outcome: Ongoing, Not Progressing     Problem: Inability to Wean (Mechanical Ventilation, Invasive)  Goal: Mechanical Ventilation Liberation  Outcome: Ongoing, Not Progressing     Problem: Nutrition Impairment (Mechanical Ventilation, Invasive)  Goal: Optimal Nutrition Delivery  Outcome: Ongoing, Not Progressing     Problem: Skin and Tissue Injury (Mechanical  Ventilation, Invasive)  Goal: Absence of Device-Related Skin and Tissue Injury  Outcome: Ongoing, Not Progressing     Problem: Ventilator-Induced Lung Injury (Mechanical Ventilation, Invasive)  Goal: Absence of Ventilator-Induced Lung Injury  Outcome: Ongoing, Not Progressing     Problem: Communication Impairment (Artificial Airway)  Goal: Effective Communication  Outcome: Ongoing, Not Progressing     Problem: Device-Related Complication Risk (Artificial Airway)  Goal: Optimal Device Function  Outcome: Ongoing, Not Progressing     Problem: Skin and Tissue Injury (Artificial Airway)  Goal: Absence of Device-Related Skin or Tissue Injury  Outcome: Ongoing, Not Progressing     Problem: Noninvasive Ventilation Acute  Goal: Effective Unassisted Ventilation and Oxygenation  Outcome: Ongoing, Not Progressing         NEURO:   Patient remains intubated throughout shift  Remains at neuro baseline   TMAX 100.4  Patient lacks corneal and gag reflexes  PERRLA not present.   Patient continues to posture to painful stimuli    RESP:   No changes, remains on ordered ventilator settings  Saturations remains adequate, no significant desaturations in oxygenation noted.      CV:   Remains hemodynamically stable.      GI/:   Patient remains NPO   Novasource Renal tube feedings continue at goal rate.  Continues to have decreased urine output via the arzate catheter in place. Arzate removed.   No BM over night.      MISC:   No PRNs administered overnight.  POC updated with the patient's family and providers.   Accuchecks performed and SSI administered accordingly.       No acute events occurred throughout shift. Vitals and assessments per flowsheets. POC remains ongoing.

## 2024-03-14 NOTE — PROGRESS NOTES
[facility] - Intensive Care ([facility])  Critical Care Medicine  Progress Note    Patient Name: Avril Monzon  MRN: [id number]  Admission Date: 3/7/2024  Hospital Length of Stay: 7 days  Code Status: DNR  Attending Provider: Sebastien Olivo MD  Primary Care Provider: Rose Germain MD   Principal Problem: Cardiac arrest    Subjective:     HPI:  79yr old with CMP ef 45%, Severe pulmonary HTN PAP > 85, Valvular heart disease, DM, ESRD, CVA, Retinopathy was getting outpatient EUNICE.  She was given diprivan for the procedure and post procedure remained unresponsive, then during EKG went from tachycardia to asystole.   She was given one roud of Epi, several boluses of vasopressors by anesthesia and was resuscitated for a few minutes. Intubated and bough to ICU.   In ICU CVL was placed and she was placed on levo. She had two more VT / VF episodes requiring CPR, Amiodarone bolus was given in the previous arrest and amiodarone infusion was initiated.   An A line was placed and CT head and other orders were placed. Family was updated by Cardiology. She was also given Ca and bicarbonate times 2 during resuscitation.     Hospital/ICU Course:  3/8. ABG reviewed. Settings changed. Myoclonic activity. Suspect a longer arrest duration in recovery. Tele Neuro consult, Cont EEG and other test pending.  3/9: remains neurologically devastated on the vent, family request meeting at 1600 today, continue supportive care   3/10: no meaningful recovery from a neuro standpoint, remains off sedation on the vent, supportive care   3/11: neuro exam unchanged.  Vent and hemodynamics are stable.  3/12: Has cough today.  No withdrawal to pain.  Vent and hemodynamics stable.  3/13: weak cough, faint posturing bilaterally. Vent and hemodynamics stable.  3/14: Neuro exam largely unchanged.  Vent and hemodynamics stable.     Interval History: No acute events overnight.   Neuro exam unimproved. Vent and hemodynamics are stable.      Objective:      Vital Signs (Most Recent):  Temp: 99.1 °F (37.3 °C) (03/14/24 0600)  Pulse: 88 (03/14/24 0720)  Resp: 18 (03/14/24 0720)  BP: (!) 124/59 (03/14/24 0701)  SpO2: 97 % (03/14/24 0720) Vital Signs (24h Range):  Temp:  [97.7 °F (36.5 °C)-100.4 °F (38 °C)] 99.1 °F (37.3 °C)  Pulse:  [76-98] 88  Resp:  [15-40] 18  SpO2:  [96 %-100 %] 97 %  BP: (103-155)/(55-68) 124/59     Weight: 62.1 kg (136 lb 14.5 oz)  Body mass index is 22.78 kg/m².      Intake/Output Summary (Last 24 hours) at 3/14/2024 0828  Last data filed at 3/14/2024 0200  Gross per 24 hour   Intake 510 ml   Output 1515 ml   Net -1005 ml        Physical Exam  Vitals and nursing note reviewed.   Constitutional:       General: She is not in acute distress.     Comments: Intubated, no sedation   Cardiovascular:      Rate and Rhythm: Normal rate and regular rhythm.      Pulses: Normal pulses.      Heart sounds: No murmur heard.  Pulmonary:      Effort: Pulmonary effort is normal. No respiratory distress.      Breath sounds: No wheezing, rhonchi or rales.   Abdominal:      General: Abdomen is flat. There is no distension.      Palpations: Abdomen is soft.      Tenderness: There is no abdominal tenderness.   Neurological:      Comments: Faint posturing in BUE, no movement in the legs, weak cough, L pupil fixed today           Review of Systems    Vents:  Vent Mode: A/C (03/14/24 0720)  Set Rate: 14 BPM (03/14/24 0720)  Vt Set: 360 mL (03/14/24 0720)  Pressure Support: 7 cmH20 (03/13/24 1135)  PEEP/CPAP: 5 cmH20 (03/14/24 0720)  Oxygen Concentration (%): 25 (03/14/24 0720)  Peak Airway Pressure: 18 cmH20 (03/14/24 0720)  Plateau Pressure: 22 cmH20 (03/14/24 0720)  Total Ve: 5.99 L/m (03/14/24 0720)  Negative Inspiratory Force (cm H2O): 0 (03/14/24 0720)  F/VT Ratio<105 (RSBI): (!) 47.62 (03/14/24 0720)    Lines/Drains/Airways       Central Venous Catheter Line  Duration             Percutaneous Central Line - Triple Lumen  03/07/24 1347 Internal Jugular Right 6 days               Drain  Duration                  NG/OG Tube 03/07/24 1847 Center mouth 6 days              Airway  Duration                  Airway - Non-Surgical 03/07/24 1259 6 days         Airway - Non-Surgical 03/07/24 1300 Endotracheal Tube 6 days              Peripheral Intravenous Line  Duration                  Hemodialysis AV Fistula 04/01/20 2203 Left upper arm 1442 days                    Significant Labs:    CBC/Anemia Profile:  Recent Labs   Lab 03/13/24  0412 03/14/24  0450   WBC 9.12 9.30   HGB 9.0* 8.5*   HCT 26.3* 25.6*    186   MCV 93 95   RDW 14.6* 15.0*        Chemistries:  Recent Labs   Lab 03/13/24  0412 03/14/24  0450   * 137   K 3.9 3.9   CL 93* 96   CO2 20* 24   BUN 87* 46*   CREATININE 8.2* 5.3*   CALCIUM 9.4 9.0   MG 2.3 2.2   PHOS  --  3.5       All pertinent labs within the past 24 hours have been reviewed.    Significant Imaging:  I have reviewed all pertinent imaging results/findings within the past 24 hours.    ABG  Recent Labs   Lab 03/13/24  0355   PH 7.422   PO2 95   PCO2 35.9   HCO3 23.4*   BE -1     Assessment/Plan:     Neuro  Anoxic brain injury  - s/p cardiac arrest and suspected prolonged down time   - EEG with myoclonus and neuro eval done, continue keppra   - neuro checks, supportive care, avoid fevers   - Repeat CT without acute findings, though limited study  - grave prognosis  - palliative care following, ongoing family discussions   --- planning family meeting Friday    Myoclonic jerking  - see plan for anoxic injury    Cardiac/Vascular  * Cardiac arrest  - cardiology following  - supportive care  - avoid fevers   - see plan for anoxia     Acute on chronic combined systolic and diastolic heart failure  - per cards  - monitor I&Os    Pulmonary hypertension  - severe PAP 88mmHg  - contributing to and complicating medical decision making    Renal/  ESRD (end stage renal disease)  - nephro following   - RRT per Nephro  --- dialyzed 3/13  - renally dose meds and  avoid nephrotoxins    Endocrine  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, with long-term current use of insulin  - SSI/Accuchecks    Palliative Care  Goals of care, counseling/discussion  - continue discussions and provide support      Critical Care Time: 35 minutes  Critical secondary to respiratory failure      Critical care was time spent personally by me on the following activities: development of treatment plan with patient or surrogate and bedside caregivers, discussions with consultants, evaluation of patient's response to treatment, examination of patient, ordering and performing treatments and interventions, ordering and review of laboratory studies, ordering and review of radiographic studies, pulse oximetry, re-evaluation of patient's condition. This critical care time did not overlap with that of any other provider or involve time for any procedures.     Sebastien Olivo MD  Critical Care Medicine  [facility] - Intensive Care ([facility])

## 2024-03-14 NOTE — ASSESSMENT & PLAN NOTE
- s/p cardiac arrest and suspected prolonged down time   - EEG with myoclonus and neuro eval done, continue keppra   - neuro checks, supportive care, avoid fevers   - Repeat CT without acute findings, though limited study  - grave prognosis  - palliative care following, ongoing family discussions   --- planning family meeting Friday

## 2024-03-14 NOTE — CONSULTS
[facility] ([facility])  Nephrology  Consult Note    Patient Name: Avril Monzon  MRN: [id number]  Admission Date: 3/7/2024  Hospital Length of Stay: 7 days  Attending Provider: Sebastien Olivo MD   Primary Care Physician: Rose Germain MD  Principal Problem:Cardiac arrest    Consults  Subjective:     HPI:  79-year-old female with history of end-stage renal disease.  Admitted after having cardiac arrest during a transesophageal echocardiogram.  Nephrology has been consulted for maintenance of dialysis and end-stage renal disease related issues.  Patient was seen in the intensive care unit.  In bed resting comfortably.  Intubated on multiple drips.  There is a family member at the bedside.  All nephrology related questions were answered to her satisfaction.    03/09/2024:  Patient was seen in the intensive care unit.  In bed resting comfortably.  Remains intubated.  Events from overnight noted.    03/10/2024:  Patient was seen in the intensive care unit.  Remains intubated.  Sedation was discontinued.  Documented GCS 3.  Felt to have severe anoxic brain injury.    03/11/2024:  Patient was seen in the intensive care unit.  Remains intubated.  No change in neuro exam.    03/12/2024: Patient was seen in the intensive care unit.  Remains intubated.  No improvement in neurological exam.    03/13/2024:  Patient was seen in the intensive care unit.  Remains intubated and sedated.  No change in neuro exam.    03/14/2024:  Patient was seen in the intensive care unit.  Intubated and on ventilator.  No appreciable change in neuro exam.    Past Medical History:   Diagnosis Date    Abnormal nuclear stress test 02/12/2024    Acute hypoxemic respiratory failure 11/26/2018    Anemia     Arthritis     back, hand, knees    Back pain     Cataract     CKD stage G4/A3, GFR 15 - 29 and albumin creatinine ratio >300 mg/g     GFR 40% Jan 2014 and 33% ub 3/2014 (BRG)    Coronary artery disease involving native coronary  artery of native heart 11/30/2018    Diabetic retinopathy     DM (diabetes mellitus) type II controlled, neurological manifestation     Exudative age-related macular degeneration of left eye with active choroidal neovascularization 02/05/2019    GERD (gastroesophageal reflux disease)     Glaucoma     Heart failure     Hyperlipidemia     Hypertension     Non-ST elevation MI (NSTEMI) 11/28/2018    NSTEMI (non-ST elevated myocardial infarction) 11/27/2018    Peripheral neuropathy     Polyneuropathy     Proteinuria     >6 mo     Seizures     BRG 1/2014 -dick CT NRI showed small vessel    Skin ulcer of toe of right foot with fat layer exposed 10/14/2022    Stroke 2012,2014    Tobacco dependence     Type 2 diabetes with peripheral circulatory disorder, controlled        Past Surgical History:   Procedure Laterality Date    ANGIOGRAM, LOWER ARTERIAL, UNILATERAL  2/13/2024    Procedure: Angiogram, Lower Arterial, Unilateral;  Surgeon: Michelle Holman MD;  Location: [facility] CATH LAB;  Service: Cardiology;;    ARTERIOGRAPHY OF SUBCLAVIAN ARTERY  2/13/2024    Procedure: ARTERIOGRAM, SUBCLAVIAN;  Surgeon: Michelle Holman MD;  Location: [facility] CATH LAB;  Service: Cardiology;;    BREAST LUMPECTOMY Left     benign, when patient was 15 years old    BREAST MASS EXCISION      ,benign, age 15.    CATHETERIZATION OF BOTH LEFT AND RIGHT HEART N/A 11/28/2018    Procedure: CATHETERIZATION, HEART, BOTH LEFT AND RIGHT;  Surgeon: Michelle Holman MD;  Location: [facility] CATH LAB;  Service: Cardiology;  Laterality: N/A;    CATHETERIZATION OF BOTH LEFT AND RIGHT HEART N/A 11/30/2018    Procedure: CATHETERIZATION, HEART, BOTH LEFT AND RIGHT;  Surgeon: Michelle Holman MD;  Location: [facility] CATH LAB;  Service: Cardiology;  Laterality: N/A;    CORONARY ARTERY BYPASS GRAFT      CORONARY ARTERY BYPASS GRAFT (CABG) N/A 7/20/2022    Procedure: CORONARY ARTERY BYPASS GRAFT (CABG);  Surgeon: Sanju Locke MD;  Location: [facility] OR;  Service: Cardiothoracic;   Laterality: N/A;  2-VESSEL PUMP ASSIST WITH EPI-AORTIC ULTRASOUND    CORONARY BYPASS GRAFT ANGIOGRAPHY  2/13/2024    Procedure: Bypass graft study;  Surgeon: Michelle Holman MD;  Location: [facility] [place] CATH LAB;  Service: Cardiology;;    ENDOSCOPIC HARVEST OF VEIN Left 7/20/2022    Procedure: SURGICAL PROCUREMENT, VEIN, ENDOSCOPIC;  Surgeon: Sanju Locke MD;  Location: [facility] [place] OR;  Service: Cardiothoracic;  Laterality: Left;    HYSTERECTOMY  1982    INJECTION OF ANESTHETIC AGENT AROUND MULTIPLE INTERCOSTAL NERVES N/A 7/20/2022    Procedure: BLOCK, NERVE, INTERCOSTAL, 2 OR MORE;  Surgeon: Sanju Locke MD;  Location: [facility] [place] OR;  Service: Cardiothoracic;  Laterality: N/A;  PARASTERNAL NERVE BLOCK    INSERTION OF SHUNT      LEFT HEART CATHETERIZATION Left 12/3/2018    Procedure: CATHETERIZATION, HEART, LEFT;  Surgeon: Michelle Holman MD;  Location: [facility] [place] CATH LAB;  Service: Cardiology;  Laterality: Left;  LAD ATHERECTOMY STENT/ FEMORAL APPROACH    LEFT HEART CATHETERIZATION Left 7/13/2022    Procedure: CATHETERIZATION, HEART, LEFT;  Surgeon: Abhi Sloan MD;  Location: [facility] [place] CATH LAB;  Service: Cardiology;  Laterality: Left;    LEFT HEART CATHETERIZATION Left 2/13/2024    Procedure: Left heart cath;  Surgeon: Michelle Holman MD;  Location: [facility] [place] CATH LAB;  Service: Cardiology;  Laterality: Left;    OOPHORECTOMY      RIGHT HEART CATHETERIZATION Right 2/13/2024    Procedure: INSERTION, CATHETER, RIGHT HEART;  Surgeon: Michelle Holman MD;  Location: [facility] [place] CATH LAB;  Service: Cardiology;  Laterality: Right;    TRANSESOPHAGEAL ECHOCARDIOGRAPHY N/A 3/7/2024    Procedure: ECHOCARDIOGRAM, TRANSESOPHAGEAL;  Surgeon: Colton Abreu MD;  Location: [facility] [place] CATH LAB;  Service: Cardiology;  Laterality: N/A;    wrist cyst Right 1980s    dorsal wrist cyst       Review of patient's allergies indicates:   Allergen Reactions    Norvasc [amlodipine] Shortness Of Breath    Codeine Swelling     Other Reaction(s): Itchy skin eruption  (code-31331830,SNOMED-CT)    Propoxyphene Swelling     Other Reaction(s): Itchy skin eruption (code-[id number],SNOMED-CT)    Atorvastatin      Muscle twitching     Current Facility-Administered Medications   Medication Frequency    acetaminophen oral solution 650 mg Q6H PRN    amiodarone tablet 200 mg BID    chlorhexidine 0.12 % solution 15 mL BID    dextrose 10% bolus 125 mL 125 mL PRN    dextrose 10% bolus 250 mL 250 mL PRN    fentaNYL 50 mcg/mL injection 25 mcg Q1H PRN    glucagon (human recombinant) injection 1 mg PRN    heparin (porcine) injection 5,000 Units Q8H    insulin aspart U-100 pen 0-10 Units Q4H PRN    insulin detemir U-100 (Levemir) pen 10 Units Daily    levETIRAcetam in NaCl (iso-os) IVPB 1,000 mg QHS    pantoprazole injection 40 mg Daily    polyethylene glycol packet 17 g Daily    sodium chloride 0.9% bolus 250 mL 250 mL PRN    sodium chloride 0.9% bolus 250 mL 250 mL PRN    sodium chloride 0.9% flush 10 mL PRN     Family History       Problem Relation (Age of Onset)    Cancer Maternal Grandmother    Diabetes Mother    Heart disease Mother    Hyperlipidemia Mother    Hypertension Mother    Muscular dystrophy Son          Tobacco Use    Smoking status: Former     Current packs/day: 0.00     Average packs/day: 1.5 packs/day for 30.0 years (45.0 ttl pk-yrs)     Types: Cigarettes     Start date: 1960     Quit date: 1990     Years since quittin.2    Smokeless tobacco: Former   Substance and Sexual Activity    Alcohol use: No     Alcohol/week: 0.0 standard drinks of alcohol    Drug use: No    Sexual activity: Not Currently     Review of Systems   Reason unable to perform ROS: Patient is intubated and sedated.     Objective:     Vital Signs (Most Recent):  Temp: 99.1 °F (37.3 °C) (24 0600)  Pulse: 74 (24 09)  Resp: 15 (24)  BP: (!) 123/59 (24)  SpO2: 98 % (24) Vital Signs (24h Range):  Temp:  [97.7 °F (36.5 °C)-100.4 °F (38 °C)] 99.1 °F (37.3  °C)  Pulse:  [74-98] 74  Resp:  [15-40] 15  SpO2:  [96 %-100 %] 98 %  BP: (103-155)/(55-68) 123/59     Weight: 62.1 kg (136 lb 14.5 oz) (03/14/24 0600)  Body mass index is 22.78 kg/m².  Body surface area is 1.69 meters squared.    I/O last 3 completed shifts:  In: 785 [NG/GT:585; IV Piggyback:200]  Out: 1561 [Urine:61; Other:1500]    Physical Exam  Constitutional:       General: She is not in acute distress.     Appearance: She is ill-appearing.   HENT:      Head: Normocephalic and atraumatic.   Eyes:      General: No scleral icterus.     Extraocular Movements: Extraocular movements intact.      Pupils: Pupils are equal, round, and reactive to light.   Cardiovascular:      Rate and Rhythm: Regular rhythm. Tachycardia present.   Pulmonary:      Effort: Pulmonary effort is normal.      Breath sounds: No stridor.   Musculoskeletal:      Right lower leg: No edema.      Left lower leg: No edema.   Skin:     General: Skin is warm and dry.   Neurological:      Comments: Can not be assessed.   Psychiatric:      Comments: Can not be assessed.         Significant Labs:  BMP:   Recent Labs   Lab 03/14/24  0450   *      K 3.9   CL 96   CO2 24   BUN 46*   CREATININE 5.3*   CALCIUM 9.0   MG 2.2       CMP:   Recent Labs   Lab 03/11/24  0433 03/12/24  0415 03/14/24  0450   *   < > 235*   CALCIUM 8.3*   < > 9.0   ALBUMIN 2.5*  --   --    PROT 5.4*  --   --       < > 137   K 4.3   < > 3.9   CO2 21*   < > 24   CL 94*   < > 96   BUN 62*   < > 46*   CREATININE 7.4*   < > 5.3*   ALKPHOS 208*  --   --    ALT 29  --   --    AST 56*  --   --    BILITOT 0.4  --   --     < > = values in this interval not displayed.       All labs within the past 24 hours have been reviewed.    Significant Imaging:  Labs: Reviewed      Assessment/Plan:     Active Diagnoses:    Diagnosis Date Noted POA    PRINCIPAL PROBLEM:  Cardiac arrest [I46.9] 03/07/2024 Yes    Encounter for palliative care [Z51.5] 03/13/2024 Not Applicable     Epilepsy [G40.909] 03/08/2024 No    Anoxic brain injury [G93.1] 03/08/2024 No    Goals of care, counseling/discussion [Z71.89] 01/25/2023 Not Applicable    Acute on chronic combined systolic and diastolic heart failure [I50.43] 12/20/2021 Yes    Myoclonic jerking [G25.3] 12/20/2021 Yes    Pulmonary hypertension [I27.20] 11/28/2018 Yes    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, with long-term current use of insulin [E11.3553, Z79.4] 05/02/2016 Not Applicable    ESRD (end stage renal disease) [N18.6]  Yes      Problems Resolved During this Admission:       Assessment and plan:    1. End-stage renal disease:  She appears to have had a devastating brain injury secondary to anoxia.  Neurology notes and Critical Care notes reviewed.    Palliative Care has been consulted.  At this point likelihood of meaningful recovery is minimal.  Continuing with dialysis would not be indicated.      Her last dialysis treatment was yesterday.  Uneventful.  Discussed with Dr. Olivo.  Will plan to hold dialysis tomorrow in lieu of a family meeting to decide whether or not they wish to transition to comfort measures.    2. Electrolytes:  Potassium is stable at 3.9.    3. Acid-base: Serum bicarbonate is stable at 20    4. Volume: She does not appear to be significantly volume overload.  Will use minimal ultrafiltration with dialysis today.      Thank you for your consult.     Adrian Ramirez MD  Nephrology  [facility] - Intensive Care ([facility])

## 2024-03-14 NOTE — PLAN OF CARE
Patient resting in bed with safety and fall prevention measures in place. Current vital  signs are stable. Plan of care discussed with family. Care ongoing.

## 2024-03-14 NOTE — ASSESSMENT & PLAN NOTE
- nephro following   - RRT per Nephro  --- dialyzed 3/13  - renally dose meds and avoid nephrotoxins

## 2024-03-14 NOTE — SUBJECTIVE & OBJECTIVE
Interval History: No acute events overnight.   Neuro exam unimproved. Vent and hemodynamics are stable.      Objective:     Vital Signs (Most Recent):  Temp: 99.1 °F (37.3 °C) (03/14/24 0600)  Pulse: 88 (03/14/24 0720)  Resp: 18 (03/14/24 0720)  BP: (!) 124/59 (03/14/24 0701)  SpO2: 97 % (03/14/24 0720) Vital Signs (24h Range):  Temp:  [97.7 °F (36.5 °C)-100.4 °F (38 °C)] 99.1 °F (37.3 °C)  Pulse:  [76-98] 88  Resp:  [15-40] 18  SpO2:  [96 %-100 %] 97 %  BP: (103-155)/(55-68) 124/59     Weight: 62.1 kg (136 lb 14.5 oz)  Body mass index is 22.78 kg/m².      Intake/Output Summary (Last 24 hours) at 3/14/2024 0828  Last data filed at 3/14/2024 0200  Gross per 24 hour   Intake 510 ml   Output 1515 ml   Net -1005 ml        Physical Exam  Vitals and nursing note reviewed.   Constitutional:       General: She is not in acute distress.     Comments: Intubated, no sedation   Cardiovascular:      Rate and Rhythm: Normal rate and regular rhythm.      Pulses: Normal pulses.      Heart sounds: No murmur heard.  Pulmonary:      Effort: Pulmonary effort is normal. No respiratory distress.      Breath sounds: No wheezing, rhonchi or rales.   Abdominal:      General: Abdomen is flat. There is no distension.      Palpations: Abdomen is soft.      Tenderness: There is no abdominal tenderness.   Neurological:      Comments: Faint posturing in BUE, no movement in the legs, weak cough, L pupil fixed today           Review of Systems    Vents:  Vent Mode: A/C (03/14/24 0720)  Set Rate: 14 BPM (03/14/24 0720)  Vt Set: 360 mL (03/14/24 0720)  Pressure Support: 7 cmH20 (03/13/24 1135)  PEEP/CPAP: 5 cmH20 (03/14/24 0720)  Oxygen Concentration (%): 25 (03/14/24 0720)  Peak Airway Pressure: 18 cmH20 (03/14/24 0720)  Plateau Pressure: 22 cmH20 (03/14/24 0720)  Total Ve: 5.99 L/m (03/14/24 0720)  Negative Inspiratory Force (cm H2O): 0 (03/14/24 0720)  F/VT Ratio<105 (RSBI): (!) 47.62 (03/14/24 0720)    Lines/Drains/Airways       Central Venous  Catheter Line  Duration             Percutaneous Central Line - Triple Lumen  03/07/24 1347 Internal Jugular Right 6 days              Drain  Duration                  NG/OG Tube 03/07/24 1847 Center mouth 6 days              Airway  Duration                  Airway - Non-Surgical 03/07/24 1259 6 days         Airway - Non-Surgical 03/07/24 1300 Endotracheal Tube 6 days              Peripheral Intravenous Line  Duration                  Hemodialysis AV Fistula 04/01/20 2203 Left upper arm 1442 days                    Significant Labs:    CBC/Anemia Profile:  Recent Labs   Lab 03/13/24  0412 03/14/24  0450   WBC 9.12 9.30   HGB 9.0* 8.5*   HCT 26.3* 25.6*    186   MCV 93 95   RDW 14.6* 15.0*        Chemistries:  Recent Labs   Lab 03/13/24  0412 03/14/24  0450   * 137   K 3.9 3.9   CL 93* 96   CO2 20* 24   BUN 87* 46*   CREATININE 8.2* 5.3*   CALCIUM 9.4 9.0   MG 2.3 2.2   PHOS  --  3.5       All pertinent labs within the past 24 hours have been reviewed.    Significant Imaging:  I have reviewed all pertinent imaging results/findings within the past 24 hours.

## 2024-03-15 LAB
ANION GAP SERPL CALC-SCNC: 16 MMOL/L (ref 8–16)
BASOPHILS # BLD AUTO: 0.02 K/UL (ref 0–0.2)
BASOPHILS NFR BLD: 0.2 % (ref 0–1.9)
BUN SERPL-MCNC: 57 MG/DL (ref 8–23)
CALCIUM SERPL-MCNC: 9.3 MG/DL (ref 8.7–10.5)
CHLORIDE SERPL-SCNC: 96 MMOL/L (ref 95–110)
CO2 SERPL-SCNC: 26 MMOL/L (ref 23–29)
CREAT SERPL-MCNC: 6.3 MG/DL (ref 0.5–1.4)
DIFFERENTIAL METHOD BLD: ABNORMAL
EOSINOPHIL # BLD AUTO: 0 K/UL (ref 0–0.5)
EOSINOPHIL NFR BLD: 0.5 % (ref 0–8)
ERYTHROCYTE [DISTWIDTH] IN BLOOD BY AUTOMATED COUNT: 15.1 % (ref 11.5–14.5)
EST. GFR  (NO RACE VARIABLE): 6 ML/MIN/1.73 M^2
GLUCOSE SERPL-MCNC: 235 MG/DL (ref 70–110)
HCT VFR BLD AUTO: 25.3 % (ref 37–48.5)
HGB BLD-MCNC: 8.4 G/DL (ref 12–16)
IMM GRANULOCYTES # BLD AUTO: 0.07 K/UL (ref 0–0.04)
IMM GRANULOCYTES NFR BLD AUTO: 0.8 % (ref 0–0.5)
LYMPHOCYTES # BLD AUTO: 0.4 K/UL (ref 1–4.8)
LYMPHOCYTES NFR BLD: 4.7 % (ref 18–48)
MAGNESIUM SERPL-MCNC: 2.5 MG/DL (ref 1.6–2.6)
MCH RBC QN AUTO: 31.5 PG (ref 27–31)
MCHC RBC AUTO-ENTMCNC: 33.2 G/DL (ref 32–36)
MCV RBC AUTO: 95 FL (ref 82–98)
MONOCYTES # BLD AUTO: 0.6 K/UL (ref 0.3–1)
MONOCYTES NFR BLD: 6.5 % (ref 4–15)
NEUTROPHILS # BLD AUTO: 7.7 K/UL (ref 1.8–7.7)
NEUTROPHILS NFR BLD: 87.3 % (ref 38–73)
NRBC BLD-RTO: 0 /100 WBC
PHOSPHATE SERPL-MCNC: 3.3 MG/DL (ref 2.7–4.5)
PLATELET # BLD AUTO: 211 K/UL (ref 150–450)
PMV BLD AUTO: 9.7 FL (ref 9.2–12.9)
POCT GLUCOSE: 129 MG/DL (ref 70–110)
POCT GLUCOSE: 190 MG/DL (ref 70–110)
POCT GLUCOSE: 231 MG/DL (ref 70–110)
POCT GLUCOSE: 246 MG/DL (ref 70–110)
POCT GLUCOSE: 248 MG/DL (ref 70–110)
POTASSIUM SERPL-SCNC: 3.7 MMOL/L (ref 3.5–5.1)
RBC # BLD AUTO: 2.67 M/UL (ref 4–5.4)
SODIUM SERPL-SCNC: 138 MMOL/L (ref 136–145)
WBC # BLD AUTO: 8.77 K/UL (ref 3.9–12.7)

## 2024-03-15 PROCEDURE — 99498 ADVNCD CARE PLAN ADDL 30 MIN: CPT | Mod: HCNC,GW,HPC, | Performed by: NURSE PRACTITIONER

## 2024-03-15 PROCEDURE — 25000003 PHARM REV CODE 250: Mod: HCNC | Performed by: NURSE PRACTITIONER

## 2024-03-15 PROCEDURE — 99900026 HC AIRWAY MAINTENANCE (STAT): Mod: HCNC

## 2024-03-15 PROCEDURE — 99233 SBSQ HOSP IP/OBS HIGH 50: CPT | Mod: HCNC,GW,HPC, | Performed by: NURSE PRACTITIONER

## 2024-03-15 PROCEDURE — 63600175 PHARM REV CODE 636 W HCPCS: Mod: HCNC | Performed by: NURSE PRACTITIONER

## 2024-03-15 PROCEDURE — 83735 ASSAY OF MAGNESIUM: CPT | Mod: HCNC | Performed by: NURSE PRACTITIONER

## 2024-03-15 PROCEDURE — 84100 ASSAY OF PHOSPHORUS: CPT | Mod: HCNC

## 2024-03-15 PROCEDURE — C9113 INJ PANTOPRAZOLE SODIUM, VIA: HCPCS | Mod: HCNC | Performed by: NURSE PRACTITIONER

## 2024-03-15 PROCEDURE — 94003 VENT MGMT INPAT SUBQ DAY: CPT | Mod: HCNC

## 2024-03-15 PROCEDURE — 63600175 PHARM REV CODE 636 W HCPCS: Mod: HCNC

## 2024-03-15 PROCEDURE — 94761 N-INVAS EAR/PLS OXIMETRY MLT: CPT | Mod: HCNC

## 2024-03-15 PROCEDURE — 99497 ADVNCD CARE PLAN 30 MIN: CPT | Mod: HCNC,GW,HPC, | Performed by: NURSE PRACTITIONER

## 2024-03-15 PROCEDURE — 25000003 PHARM REV CODE 250: Mod: HCNC | Performed by: SPECIALIST

## 2024-03-15 PROCEDURE — 25000003 PHARM REV CODE 250: Mod: HCNC | Performed by: INTERNAL MEDICINE

## 2024-03-15 PROCEDURE — 99233 SBSQ HOSP IP/OBS HIGH 50: CPT | Mod: HCNC,,, | Performed by: INTERNAL MEDICINE

## 2024-03-15 PROCEDURE — 80048 BASIC METABOLIC PNL TOTAL CA: CPT | Mod: HCNC | Performed by: NURSE PRACTITIONER

## 2024-03-15 PROCEDURE — 63600175 PHARM REV CODE 636 W HCPCS: Mod: HCNC | Performed by: SPECIALIST

## 2024-03-15 PROCEDURE — 27100171 HC OXYGEN HIGH FLOW UP TO 24 HOURS: Mod: HCNC

## 2024-03-15 PROCEDURE — 85025 COMPLETE CBC W/AUTO DIFF WBC: CPT | Mod: HCNC | Performed by: NURSE PRACTITIONER

## 2024-03-15 PROCEDURE — 63600175 PHARM REV CODE 636 W HCPCS: Mod: HCNC | Performed by: INTERNAL MEDICINE

## 2024-03-15 PROCEDURE — 20000000 HC ICU ROOM: Mod: HCNC

## 2024-03-15 PROCEDURE — 99900035 HC TECH TIME PER 15 MIN (STAT): Mod: HCNC

## 2024-03-15 RX ORDER — MORPHINE SULFATE 2 MG/ML
2 INJECTION, SOLUTION INTRAMUSCULAR; INTRAVENOUS ONCE
Status: COMPLETED | OUTPATIENT
Start: 2024-03-15 | End: 2024-03-15

## 2024-03-15 RX ORDER — MIDAZOLAM HYDROCHLORIDE 1 MG/ML
2 INJECTION INTRAMUSCULAR; INTRAVENOUS
Status: DISCONTINUED | OUTPATIENT
Start: 2024-03-15 | End: 2024-03-16 | Stop reason: HOSPADM

## 2024-03-15 RX ORDER — MIDAZOLAM HYDROCHLORIDE 1 MG/ML
0.5 INJECTION INTRAMUSCULAR; INTRAVENOUS
Status: DISCONTINUED | OUTPATIENT
Start: 2024-03-15 | End: 2024-03-15

## 2024-03-15 RX ORDER — MORPHINE SULFATE 2 MG/ML
2 INJECTION, SOLUTION INTRAMUSCULAR; INTRAVENOUS
Status: DISCONTINUED | OUTPATIENT
Start: 2024-03-15 | End: 2024-03-15

## 2024-03-15 RX ORDER — MORPHINE SULFATE 4 MG/ML
4 INJECTION, SOLUTION INTRAMUSCULAR; INTRAVENOUS
Status: DISCONTINUED | OUTPATIENT
Start: 2024-03-15 | End: 2024-03-16 | Stop reason: HOSPADM

## 2024-03-15 RX ADMIN — INSULIN DETEMIR 10 UNITS: 100 INJECTION, SOLUTION SUBCUTANEOUS at 08:03

## 2024-03-15 RX ADMIN — PANTOPRAZOLE SODIUM 40 MG: 40 INJECTION, POWDER, FOR SOLUTION INTRAVENOUS at 08:03

## 2024-03-15 RX ADMIN — AMIODARONE HYDROCHLORIDE 200 MG: 200 TABLET ORAL at 09:03

## 2024-03-15 RX ADMIN — HEPARIN SODIUM 5000 UNITS: 5000 INJECTION INTRAVENOUS; SUBCUTANEOUS at 06:03

## 2024-03-15 RX ADMIN — LEVETIRACETAM INJECTION 1000 MG: 10 INJECTION INTRAVENOUS at 09:03

## 2024-03-15 RX ADMIN — INSULIN DETEMIR 10 UNITS: 100 INJECTION, SOLUTION SUBCUTANEOUS at 09:03

## 2024-03-15 RX ADMIN — MORPHINE SULFATE 4 MG: 4 INJECTION INTRAVENOUS at 10:03

## 2024-03-15 RX ADMIN — HEPARIN SODIUM 5000 UNITS: 5000 INJECTION INTRAVENOUS; SUBCUTANEOUS at 01:03

## 2024-03-15 RX ADMIN — AMIODARONE HYDROCHLORIDE 200 MG: 200 TABLET ORAL at 08:03

## 2024-03-15 RX ADMIN — CHLORHEXIDINE GLUCONATE 0.12% ORAL RINSE 15 ML: 1.2 LIQUID ORAL at 08:03

## 2024-03-15 RX ADMIN — INSULIN ASPART 4 UNITS: 100 INJECTION, SOLUTION INTRAVENOUS; SUBCUTANEOUS at 08:03

## 2024-03-15 RX ADMIN — INSULIN ASPART 2 UNITS: 100 INJECTION, SOLUTION INTRAVENOUS; SUBCUTANEOUS at 11:03

## 2024-03-15 RX ADMIN — INSULIN ASPART 2 UNITS: 100 INJECTION, SOLUTION INTRAVENOUS; SUBCUTANEOUS at 01:03

## 2024-03-15 RX ADMIN — GLYCOPYRROLATE 0.2 MG: 0.2 INJECTION, SOLUTION INTRAMUSCULAR; INTRAVITREAL at 09:03

## 2024-03-15 NOTE — PROGRESS NOTES
[facility] ([facility])  Palliative Medicine  Progress Note    Patient Name: Avril Monzon  MRN: [id number]  Admission Date: 3/7/2024  Hospital Length of Stay: 8 days  Code Status: DNR   Attending Provider: Sebastien Olivo MD  Consulting Provider: Carissa Blanca NP  Primary Care Physician: Rose Germain MD  Principal Problem:Cardiac arrest    Patient information was obtained from relative(s), past medical records, and primary team.      Assessment/Plan:     Cardiac/Vascular  * Cardiac arrest  -Patient remains intubated breathing over the vent, no sedation on board with no significant neurologic changes with anoxic brain injury in which neurology noted no chance for meaningful recovery. Patient with ESRD on HD as well and CHF. Patient with grave prognosis. Daughter reported functional and medical decline prior to EUNICE.   -Family meeting today in which we discussed goals of care extensively comfort focused treatment and palliative extubation in regards to honoring patient's wishes per her living will and provided medical updates for family. At this time daughter/HCPOA and grand daughter struggling with honoring patient's wishes and patient's sister/HCPOA and other multiple family members want to honor patient's wishes. Plan to follow up on Monday as no decisions were made today.   -DNR/I    Palliative Care  Encounter for palliative care  -Code status: DNR/I.    -HCPOA: Patient's daughter Elizabeth Monzon and her sister Myriam PATTERSON.   -GOC:  Focus on extending life as long as possible, even it it means sacrificing quality. Accordingly, we have decided that the best plan to meet the patient's goals includes continuing with treatment. Family meeting today with patient's grand daughter Alex, patient's siblings, and multiple family members. Patient's daughter and grand daughter struggling with following patient's living will and patient's siblings and other family members advocating for patient and  trying to support them to honor patient's wishes. We discuss extensively comfort focused treatment, palliative extubation, and inpatient hospice along with patient's living will in which grand daughter received another copy per request. PM team to follow up on Monday.   -Family meeting planned to follow up on Friday at 2PM.   -See HPI for further details          I will follow-up with patient. Please contact us if you have any additional questions.    Subjective:     Chief Complaint:   Chief Complaint   Patient presents with    Cardiac Arrest       HPI:   Mrs. Monzon is a 79yr old female with CMP ef 45%, Severe pulmonary HTN PAP > 85, Valvular heart disease, DM, ESRD, CVA, Retinopathy who presented to hospital for outpatient EUNICE. She was given diprivan for the procedure and post procedure remained unresponsive, then during EKG went from tachycardia to asystole. She was given one roud of Epi, several boluses of vasopressors by anesthesia and was resuscitated for a few minutes. Intubated and bough to ICU. In ICU CVL was placed and she was placed on levo. She had two more VT / VF episodes requiring CPR, Amiodarone bolus was given in the previous arrest and amiodarone infusion was initiated. An A line was placed and CT head and other orders were placed. Family was updated by Cardiology. She was also given Ca and bicarbonate times 2 during resuscitation. During hospital coarse, it was suspected a longer arrest duration in recovery in which tiuo was consulted and EEG was done. Patient noted to remain neurologically devastated on the vent in which neurology noted no meaningful recovery. Patient remains off sedation with no significant changes Now noted with weak cough, faint posturing bilaterally. Nephrology following and patient was unable to dialyze yesterday due to difficulty with fistula access, patient did undergo dialysis on today. ICU team provided daughter/YESENIA Johnson with updates, palliative medicine consulted  for goals of care and advance care planning.          Hospital Course:  No notes on file    Interval History: Patient remains on vent (day 8 of intubation) no significant neurologic changes. Neurology saw s/p cardiac arrest and long down time, neurology stated no change of meaningful recovery. Family meeting today with patient's grand daughter Alex, patient's siblings, and multiple family members. Patient's daughter and grand daughter struggling with following patient's living will and patient's siblings and other family members advocating for patient and trying to support them to honor patient's wishes. We discuss extensively comfort focused treatment, palliative extubation, and inpatient hospice along with patient's living will in which grand daughter received another copy per request. PM team to follow up on Monday. DNR/I. All questions and concerns addressed. Primary team updated.     Past Medical History:   Diagnosis Date    Abnormal nuclear stress test 02/12/2024    Acute hypoxemic respiratory failure 11/26/2018    Anemia     Arthritis     back, hand, knees    Back pain     Cataract     CKD stage G4/A3, GFR 15 - 29 and albumin creatinine ratio >300 mg/g     GFR 40% Jan 2014 and 33% ub 3/2014 ([facility])    Coronary artery disease involving native coronary artery of native heart 11/30/2018    Diabetic retinopathy     DM (diabetes mellitus) type II controlled, neurological manifestation     Exudative age-related macular degeneration of left eye with active choroidal neovascularization 02/05/2019    GERD (gastroesophageal reflux disease)     Glaucoma     Heart failure     Hyperlipidemia     Hypertension     Non-ST elevation MI (NSTEMI) 11/28/2018    NSTEMI (non-ST elevated myocardial infarction) 11/27/2018    Peripheral neuropathy     Polyneuropathy     Proteinuria     >6 mo     Seizures     BR[facility] 1/2014 -dick CT NRI showed small vessel    Skin ulcer of toe of right foot with fat layer exposed 10/14/2022    Stroke  2012,2014    Tobacco dependence     Type 2 diabetes with peripheral circulatory disorder, controlled        Past Surgical History:   Procedure Laterality Date    ANGIOGRAM, LOWER ARTERIAL, UNILATERAL  2/13/2024    Procedure: Angiogram, Lower Arterial, Unilateral;  Surgeon: Michelle Holman MD;  Location: [facility] CATH LAB;  Service: Cardiology;;    ARTERIOGRAPHY OF SUBCLAVIAN ARTERY  2/13/2024    Procedure: ARTERIOGRAM, SUBCLAVIAN;  Surgeon: Michelle Holman MD;  Location: [facility] CATH LAB;  Service: Cardiology;;    BREAST LUMPECTOMY Left     benign, when patient was 15 years old    BREAST MASS EXCISION      ,benign, age 15.    CATHETERIZATION OF BOTH LEFT AND RIGHT HEART N/A 11/28/2018    Procedure: CATHETERIZATION, HEART, BOTH LEFT AND RIGHT;  Surgeon: Michelle Holman MD;  Location: [facility] CATH LAB;  Service: Cardiology;  Laterality: N/A;    CATHETERIZATION OF BOTH LEFT AND RIGHT HEART N/A 11/30/2018    Procedure: CATHETERIZATION, HEART, BOTH LEFT AND RIGHT;  Surgeon: Michelle Holman MD;  Location: [facility] CATH LAB;  Service: Cardiology;  Laterality: N/A;    CORONARY ARTERY BYPASS GRAFT      CORONARY ARTERY BYPASS GRAFT (CABG) N/A 7/20/2022    Procedure: CORONARY ARTERY BYPASS GRAFT (CABG);  Surgeon: Sanju Locke MD;  Location: [facility];  Service: Cardiothoracic;  Laterality: N/A;  2-VESSEL PUMP ASSIST WITH EPI-AORTIC ULTRASOUND    CORONARY BYPASS GRAFT ANGIOGRAPHY  2/13/2024    Procedure: Bypass graft study;  Surgeon: Michelle Holman MD;  Location: [facility] CATH LAB;  Service: Cardiology;;    ENDOSCOPIC HARVEST OF VEIN Left 7/20/2022    Procedure: SURGICAL PROCUREMENT, VEIN, ENDOSCOPIC;  Surgeon: Sanju Locke MD;  Location: [facility] OR;  Service: Cardiothoracic;  Laterality: Left;    HYSTERECTOMY  1982    INJECTION OF ANESTHETIC AGENT AROUND MULTIPLE INTERCOSTAL NERVES N/A 7/20/2022    Procedure: BLOCK, NERVE, INTERCOSTAL, 2 OR MORE;  Surgeon: Sanju Locke MD;  Location: [facility] OR;  Service: Cardiothoracic;  Laterality:  N/A;  PARASTERNAL NERVE BLOCK    INSERTION OF SHUNT      LEFT HEART CATHETERIZATION Left 12/3/2018    Procedure: CATHETERIZATION, HEART, LEFT;  Surgeon: Michelle Holman MD;  Location: [facility] CATH LAB;  Service: Cardiology;  Laterality: Left;  LAD ATHERECTOMY STENT/ FEMORAL APPROACH    LEFT HEART CATHETERIZATION Left 7/13/2022    Procedure: CATHETERIZATION, HEART, LEFT;  Surgeon: Abhi Sloan MD;  Location: [facility] CATH LAB;  Service: Cardiology;  Laterality: Left;    LEFT HEART CATHETERIZATION Left 2/13/2024    Procedure: Left heart cath;  Surgeon: Michelle Holman MD;  Location: [facility] CATH LAB;  Service: Cardiology;  Laterality: Left;    OOPHORECTOMY      RIGHT HEART CATHETERIZATION Right 2/13/2024    Procedure: INSERTION, CATHETER, RIGHT HEART;  Surgeon: Michelle Holman MD;  Location: [facility] CATH LAB;  Service: Cardiology;  Laterality: Right;    TRANSESOPHAGEAL ECHOCARDIOGRAPHY N/A 3/7/2024    Procedure: ECHOCARDIOGRAM, TRANSESOPHAGEAL;  Surgeon: Colton Abreu MD;  Location: [facility] CATH LAB;  Service: Cardiology;  Laterality: N/A;    wrist cyst Right 1980s    dorsal wrist cyst       Review of patient's allergies indicates:   Allergen Reactions    Norvasc [amlodipine] Shortness Of Breath    Codeine Swelling     Other Reaction(s): Itchy skin eruption (code-02902620,SNOMED-CT)    Propoxyphene Swelling     Other Reaction(s): Itchy skin eruption (code-[id number],SNOMED-CT)    Atorvastatin      Muscle twitching       Medications:  Continuous Infusions:  Scheduled Meds:   amiodarone  200 mg Per OG tube BID    chlorhexidine  15 mL Mouth/Throat BID    heparin (porcine)  5,000 Units Subcutaneous Q8H    [START ON 3/16/2024] insulin detemir U-100  20 Units Subcutaneous Daily    levETIRAcetam (Keppra) IV (PEDS and ADULTS)  1,000 mg Intravenous QHS    pantoprazole  40 mg Intravenous Daily    polyethylene glycol  17 g Per NG tube Daily     PRN Meds:acetaminophen, dextrose 10%, dextrose 10%, fentaNYL, glucagon (human recombinant),  insulin aspart U-100, sodium chloride 0.9%, sodium chloride 0.9%, sodium chloride 0.9%    Family History       Problem Relation (Age of Onset)    Cancer Maternal Grandmother    Diabetes Mother    Heart disease Mother    Hyperlipidemia Mother    Hypertension Mother    Muscular dystrophy Son          Tobacco Use    Smoking status: Former     Current packs/day: 0.00     Average packs/day: 1.5 packs/day for 30.0 years (45.0 ttl pk-yrs)     Types: Cigarettes     Start date: 1960     Quit date: 1990     Years since quittin.2    Smokeless tobacco: Former   Substance and Sexual Activity    Alcohol use: No     Alcohol/week: 0.0 standard drinks of alcohol    Drug use: No    Sexual activity: Not Currently       Review of Systems   Unable to perform ROS: Intubated     Objective:     Vital Signs (Most Recent):  Temp: 98.5 °F (36.9 °C) (03/15/24 1515)  Pulse: 78 (03/15/24 1500)  Resp: 14 (03/15/24 1500)  BP: (!) 164/74 (03/15/24 1500)  SpO2: 99 % (03/15/24 1500) Vital Signs (24h Range):  Temp:  [97.9 °F (36.6 °C)-98.8 °F (37.1 °C)] 98.5 °F (36.9 °C)  Pulse:  [67-87] 78  Resp:  [8-21] 14  SpO2:  [92 %-100 %] 99 %  BP: (124-178)/(60-89) 164/74     Weight: 56.3 kg (124 lb 1.9 oz)  Body mass index is 20.65 kg/m².       Physical Exam  Vitals and nursing note reviewed.   Constitutional:       Appearance: She is ill-appearing.      Comments: Intubated, no sedation on board, no response to voice   HENT:      Head: Normocephalic.      Nose: Nose normal.      Mouth/Throat:      Mouth: Mucous membranes are dry.   Eyes:      General:         Right eye: No discharge.         Left eye: No discharge.   Cardiovascular:      Rate and Rhythm: Normal rate.   Pulmonary:      Comments: Intubated, over breathing the vent per ICU team    Abdominal:      Palpations: Abdomen is soft.   Musculoskeletal:         General: Swelling present.      Right lower leg: Edema present.      Left lower leg: Edema present.   Skin:     General: Skin is warm  and dry.   Neurological:      Comments: Faint posturing  per ICU team, weak cough            Review of Symptoms      Symptom Assessment (ESAS 0-10 Scale)  Pain:  0  Dyspnea:  0  Anxiety:  0  Nausea:  0  Depression:  0  Anorexia:  0  Fatigue:  0  Insomnia:  0  Restlessness:  0  Agitation:  0         Performance Status:  10    Living Arrangements:  Lives with family    Psychosocial/Cultural:   See Palliative Psychosocial Note: No  Patient's daughter Elizabeth and her sister Myriam PATTERSON.   **Primary  to Follow**  Palliative Care  Consult: No        Advance Care Planning  Advance Directives:   Living Will: Yes        Copy on chart: Yes    Do Not Resuscitate Status: Yes    Medical Power of : Yes      Decision Making:  Family answered questions and Patient unable to communicate due to disease severity/cognitive impairment  Goals of Care: What is most important right now is to focus on extending life as long as possible, even it it means sacrificing quality. Accordingly, we have decided that the best plan to meet the patient's goals includes continuing with treatment. Family meeting today with patient's grand daughter Alex, patient's siblings, and multiple family members. Patient's daughter and grand daughter struggling with following patient's living will and patient's siblings and other family members advocating for patient and trying to support them to honor patient's wishes. We discuss extensively comfort focused treatment, palliative extubation, and inpatient hospice along with patient's living will in which grand daughter received another copy per request. PM team to follow up on Monday.          Significant Labs: All pertinent labs within the past 24 hours have been reviewed.  CBC:   Recent Labs   Lab 03/15/24  0430   WBC 8.77   HGB 8.4*   HCT 25.3*   MCV 95          BMP:  Recent Labs   Lab 03/15/24  0430   *      K 3.7   CL 96   CO2 26   BUN 57*    CREATININE 6.3*   CALCIUM 9.3   MG 2.5       LFT:  Lab Results   Component Value Date    AST 56 (H) 03/11/2024    ALKPHOS 208 (H) 03/11/2024    BILITOT 0.4 03/11/2024     Albumin:   Albumin   Date Value Ref Range Status   03/11/2024 2.5 (L) 3.5 - 5.2 g/dL Final     Protein:   Total Protein   Date Value Ref Range Status   03/11/2024 5.4 (L) 6.0 - 8.4 g/dL Final     Lactic acid:   Lab Results   Component Value Date    LACTATE 1.8 03/08/2024    LACTATE 2.5 (H) 03/07/2024       Significant Imaging: I have reviewed all pertinent imaging results/findings within the past 24 hours.    I spent face to face time and non-face to face time preparing to see the patient (eg, review of tests), Obtaining and/or reviewing separately obtained history, Documenting clinical information in the electronic or other health record, Independently interpreting results and communicating results to the patient/family/caregiver, or Care coordination.     > 46 minutes spent in discussing ACP    Carissa Blanca NP  Palliative Medicine  [facility] ([facility])

## 2024-03-15 NOTE — HPI
79-year-old female with end-stage renal disease, admitted after having cardiac arrest during a transesophageal echocardiogram.  Nephrology has been consulted for maintenance of dialysis and end-stage renal disease related issues. Patient has been managed in the intensive care unit, Intubated on multiple drips.     03/09/2024:  Patient was seen in the intensive care unit.  In bed resting comfortably.  Remains intubated.  Events from overnight noted.  She is severely debilitated with no signs of meaningful recovery. She withdraws from pain, otherwise is not responsive.  Pt has a living will that says she does not want support after 5 days.

## 2024-03-15 NOTE — SUBJECTIVE & OBJECTIVE
Interval History:  Patient's examined in the intensive care unit.  She remains unresponsive withdrawn later pain.  She remains intubated.  Today is her regular scheduled dialysis day however we are holding it based on her living will document.    Follow with family discussion today at 2:00 p.m..    Review of patient's allergies indicates:   Allergen Reactions    Norvasc [amlodipine] Shortness Of Breath    Codeine Swelling     Other Reaction(s): Itchy skin eruption (code-[id number],SNOMED-CT)    Propoxyphene Swelling     Other Reaction(s): Itchy skin eruption (code-[id number],SNOMED-CT)    Atorvastatin      Muscle twitching     Current Facility-Administered Medications   Medication Frequency    acetaminophen oral solution 650 mg Q6H PRN    amiodarone tablet 200 mg BID    chlorhexidine 0.12 % solution 15 mL BID    dextrose 10% bolus 125 mL 125 mL PRN    dextrose 10% bolus 250 mL 250 mL PRN    fentaNYL 50 mcg/mL injection 25 mcg Q1H PRN    glucagon (human recombinant) injection 1 mg PRN    heparin (porcine) injection 5,000 Units Q8H    insulin aspart U-100 pen 0-10 Units Q4H PRN    insulin detemir U-100 (Levemir) pen 10 Units Daily    levETIRAcetam in NaCl (iso-os) IVPB 1,000 mg QHS    pantoprazole injection 40 mg Daily    polyethylene glycol packet 17 g Daily    sodium chloride 0.9% bolus 250 mL 250 mL PRN    sodium chloride 0.9% bolus 250 mL 250 mL PRN    sodium chloride 0.9% flush 10 mL PRN       Objective:     Vital Signs (Most Recent):  Temp: 98.3 °F (36.8 °C) (03/15/24 0705)  Pulse: 80 (03/15/24 0800)  Resp: 15 (03/15/24 0800)  BP: 139/64 (03/15/24 0800)  SpO2: 98 % (03/15/24 0800) Vital Signs (24h Range):  Temp:  [97.9 °F (36.6 °C)-99 °F (37.2 °C)] 98.3 °F (36.8 °C)  Pulse:  [70-87] 80  Resp:  [8-21] 15  SpO2:  [92 %-100 %] 98 %  BP: (108-183)/(54-89) 139/64     Weight: 56.3 kg (124 lb 1.9 oz) (03/15/24 0530)  Body mass index is 20.65 kg/m².  Body surface area is 1.61 meters squared.    I/O last 3 completed  "shifts:  In: 640 [NG/GT:440; IV Piggyback:200]  Out: 5 [Urine:5]     Physical Exam  Vitals reviewed.   Constitutional:       Appearance: She is ill-appearing.   Cardiovascular:      Rate and Rhythm: Normal rate and regular rhythm.   Pulmonary:      Comments: Mechanical airway noises auscultated anteriorly.  Abdominal:      Palpations: Abdomen is soft.   Musculoskeletal:      Comments: Functional left upper extremity AV fistula with good thrill and bruit.   Skin:     General: Skin is dry.          Significant Labs:  BMP:   Recent Labs   Lab 03/15/24  0430   *      K 3.7   CL 96   CO2 26   BUN 57*   CREATININE 6.3*   CALCIUM 9.3   MG 2.5     Cardiac Markers: No results for input(s): "CKMB", "TROPONINT", "MYOGLOBIN" in the last 168 hours.  All labs within the past 24 hours have been reviewed.       "

## 2024-03-15 NOTE — SUBJECTIVE & OBJECTIVE
Interval History: Patient remains on vent (day 8 of intubation) no significant neurologic changes. Neurology saw s/p cardiac arrest and long down time, neurology stated no change of meaningful recovery. Family meeting today with patient's grand daughter Alex, patient's siblings, and multiple family members. Patient's daughter and grand daughter struggling with following patient's living will and patient's siblings and other family members advocating for patient and trying to support them to honor patient's wishes. We discuss extensively comfort focused treatment, palliative extubation, and inpatient hospice along with patient's living will in which grand daughter received another copy per request. PM team to follow up on Monday. DNR/I. All questions and concerns addressed. Primary team updated.     Past Medical History:   Diagnosis Date    Abnormal nuclear stress test 02/12/2024    Acute hypoxemic respiratory failure 11/26/2018    Anemia     Arthritis     back, hand, knees    Back pain     Cataract     CKD stage G4/A3, GFR 15 - 29 and albumin creatinine ratio >300 mg/g     GFR 40% Jan 2014 and 33% ub 3/2014 ([facility])    Coronary artery disease involving native coronary artery of native heart 11/30/2018    Diabetic retinopathy     DM (diabetes mellitus) type II controlled, neurological manifestation     Exudative age-related macular degeneration of left eye with active choroidal neovascularization 02/05/2019    GERD (gastroesophageal reflux disease)     Glaucoma     Heart failure     Hyperlipidemia     Hypertension     Non-ST elevation MI (NSTEMI) 11/28/2018    NSTEMI (non-ST elevated myocardial infarction) 11/27/2018    Peripheral neuropathy     Polyneuropathy     Proteinuria     >6 mo     Seizures     [facility] 1/2014 -dick CT NRI showed small vessel    Skin ulcer of toe of right foot with fat layer exposed 10/14/2022    Stroke 2012,2014    Tobacco dependence     Type 2 diabetes with peripheral circulatory disorder,  controlled        Past Surgical History:   Procedure Laterality Date    ANGIOGRAM, LOWER ARTERIAL, UNILATERAL  2/13/2024    Procedure: Angiogram, Lower Arterial, Unilateral;  Surgeon: Michelle Holman MD;  Location: [facility] CATH LAB;  Service: Cardiology;;    ARTERIOGRAPHY OF SUBCLAVIAN ARTERY  2/13/2024    Procedure: ARTERIOGRAM, SUBCLAVIAN;  Surgeon: Michelle Holman MD;  Location: [facility] CATH LAB;  Service: Cardiology;;    BREAST LUMPECTOMY Left     benign, when patient was 15 years old    BREAST MASS EXCISION      ,benign, age 15.    CATHETERIZATION OF BOTH LEFT AND RIGHT HEART N/A 11/28/2018    Procedure: CATHETERIZATION, HEART, BOTH LEFT AND RIGHT;  Surgeon: Michelle Holman MD;  Location: [facility] CATH LAB;  Service: Cardiology;  Laterality: N/A;    CATHETERIZATION OF BOTH LEFT AND RIGHT HEART N/A 11/30/2018    Procedure: CATHETERIZATION, HEART, BOTH LEFT AND RIGHT;  Surgeon: Michelle Holman MD;  Location: [facility] CATH LAB;  Service: Cardiology;  Laterality: N/A;    CORONARY ARTERY BYPASS GRAFT      CORONARY ARTERY BYPASS GRAFT (CABG) N/A 7/20/2022    Procedure: CORONARY ARTERY BYPASS GRAFT (CABG);  Surgeon: Sanju Locke MD;  Location: [facility];  Service: Cardiothoracic;  Laterality: N/A;  2-VESSEL PUMP ASSIST WITH EPI-AORTIC ULTRASOUND    CORONARY BYPASS GRAFT ANGIOGRAPHY  2/13/2024    Procedure: Bypass graft study;  Surgeon: Michelle Holman MD;  Location: [facility] CATH LAB;  Service: Cardiology;;    ENDOSCOPIC HARVEST OF VEIN Left 7/20/2022    Procedure: SURGICAL PROCUREMENT, VEIN, ENDOSCOPIC;  Surgeon: Sanju Locke MD;  Location: [facility];  Service: Cardiothoracic;  Laterality: Left;    HYSTERECTOMY  1982    INJECTION OF ANESTHETIC AGENT AROUND MULTIPLE INTERCOSTAL NERVES N/A 7/20/2022    Procedure: BLOCK, NERVE, INTERCOSTAL, 2 OR MORE;  Surgeon: Sanju Locke MD;  Location: [facility];  Service: Cardiothoracic;  Laterality: N/A;  PARASTERNAL NERVE BLOCK    INSERTION OF SHUNT      LEFT HEART CATHETERIZATION Left  12/3/2018    Procedure: CATHETERIZATION, HEART, LEFT;  Surgeon: Michelle Holman MD;  Location: [facility] CATH LAB;  Service: Cardiology;  Laterality: Left;  LAD ATHERECTOMY STENT/ FEMORAL APPROACH    LEFT HEART CATHETERIZATION Left 7/13/2022    Procedure: CATHETERIZATION, HEART, LEFT;  Surgeon: Abhi Sloan MD;  Location: [facility] CATH LAB;  Service: Cardiology;  Laterality: Left;    LEFT HEART CATHETERIZATION Left 2/13/2024    Procedure: Left heart cath;  Surgeon: Michelle Holman MD;  Location: [facility] CATH LAB;  Service: Cardiology;  Laterality: Left;    OOPHORECTOMY      RIGHT HEART CATHETERIZATION Right 2/13/2024    Procedure: INSERTION, CATHETER, RIGHT HEART;  Surgeon: Michelle Holman MD;  Location: [facility] CATH LAB;  Service: Cardiology;  Laterality: Right;    TRANSESOPHAGEAL ECHOCARDIOGRAPHY N/A 3/7/2024    Procedure: ECHOCARDIOGRAM, TRANSESOPHAGEAL;  Surgeon: Colton Abreu MD;  Location: [facility] CATH LAB;  Service: Cardiology;  Laterality: N/A;    wrist cyst Right 1980s    dorsal wrist cyst       Review of patient's allergies indicates:   Allergen Reactions    Norvasc [amlodipine] Shortness Of Breath    Codeine Swelling     Other Reaction(s): Itchy skin eruption ([id number],SNOMED-CT)    Propoxyphene Swelling     Other Reaction(s): Itchy skin eruption (code-[id number],SNOMED-CT)    Atorvastatin      Muscle twitching       Medications:  Continuous Infusions:  Scheduled Meds:   amiodarone  200 mg Per OG tube BID    chlorhexidine  15 mL Mouth/Throat BID    heparin (porcine)  5,000 Units Subcutaneous Q8H    [START ON 3/16/2024] insulin detemir U-100  20 Units Subcutaneous Daily    levETIRAcetam (Keppra) IV (PEDS and ADULTS)  1,000 mg Intravenous QHS    pantoprazole  40 mg Intravenous Daily    polyethylene glycol  17 g Per NG tube Daily     PRN Meds:acetaminophen, dextrose 10%, dextrose 10%, fentaNYL, glucagon (human recombinant), insulin aspart U-100, sodium chloride 0.9%, sodium chloride 0.9%, sodium chloride  0.9%    Family History       Problem Relation (Age of Onset)    Cancer Maternal Grandmother    Diabetes Mother    Heart disease Mother    Hyperlipidemia Mother    Hypertension Mother    Muscular dystrophy Son          Tobacco Use    Smoking status: Former     Current packs/day: 0.00     Average packs/day: 1.5 packs/day for 30.0 years (45.0 ttl pk-yrs)     Types: Cigarettes     Start date: 1960     Quit date: 1990     Years since quittin.2    Smokeless tobacco: Former   Substance and Sexual Activity    Alcohol use: No     Alcohol/week: 0.0 standard drinks of alcohol    Drug use: No    Sexual activity: Not Currently       Review of Systems   Unable to perform ROS: Intubated     Objective:     Vital Signs (Most Recent):  Temp: 98.5 °F (36.9 °C) (03/15/24 1515)  Pulse: 78 (03/15/24 1500)  Resp: 14 (03/15/24 1500)  BP: (!) 164/74 (03/15/24 1500)  SpO2: 99 % (03/15/24 1500) Vital Signs (24h Range):  Temp:  [97.9 °F (36.6 °C)-98.8 °F (37.1 °C)] 98.5 °F (36.9 °C)  Pulse:  [67-87] 78  Resp:  [8-21] 14  SpO2:  [92 %-100 %] 99 %  BP: (124-178)/(60-89) 164/74     Weight: 56.3 kg (124 lb 1.9 oz)  Body mass index is 20.65 kg/m².       Physical Exam  Vitals and nursing note reviewed.   Constitutional:       Appearance: She is ill-appearing.      Comments: Intubated, no sedation on board, no response to voice   HENT:      Head: Normocephalic.      Nose: Nose normal.      Mouth/Throat:      Mouth: Mucous membranes are dry.   Eyes:      General:         Right eye: No discharge.         Left eye: No discharge.   Cardiovascular:      Rate and Rhythm: Normal rate.   Pulmonary:      Comments: Intubated, over breathing the vent per ICU team    Abdominal:      Palpations: Abdomen is soft.   Musculoskeletal:         General: Swelling present.      Right lower leg: Edema present.      Left lower leg: Edema present.   Skin:     General: Skin is warm and dry.   Neurological:      Comments: Faint posturing  per ICU team, weak cough             Review of Symptoms      Symptom Assessment (ESAS 0-10 Scale)  Pain:  0  Dyspnea:  0  Anxiety:  0  Nausea:  0  Depression:  0  Anorexia:  0  Fatigue:  0  Insomnia:  0  Restlessness:  0  Agitation:  0         Performance Status:  10    Living Arrangements:  Lives with family    Psychosocial/Cultural:   See Palliative Psychosocial Note: No  Patient's daughter Elizabeth and her sister Myriam PATTERSON.   **Primary  to Follow**  Palliative Care  Consult: No        Advance Care Planning   Advance Directives:   Living Will: Yes        Copy on chart: Yes    Do Not Resuscitate Status: Yes    Medical Power of : Yes      Decision Making:  Family answered questions and Patient unable to communicate due to disease severity/cognitive impairment  Goals of Care: What is most important right now is to focus on extending life as long as possible, even it it means sacrificing quality. Accordingly, we have decided that the best plan to meet the patient's goals includes continuing with treatment. Family meeting today with patient's grand daughter Alex, patient's siblings, and multiple family members. Patient's daughter and grand daughter struggling with following patient's living will and patient's siblings and other family members advocating for patient and trying to support them to honor patient's wishes. We discuss extensively comfort focused treatment, palliative extubation, and inpatient hospice along with patient's living will in which grand daughter received another copy per request. PM team to follow up on Monday.          Significant Labs: All pertinent labs within the past 24 hours have been reviewed.  CBC:   Recent Labs   Lab 03/15/24  0430   WBC 8.77   HGB 8.4*   HCT 25.3*   MCV 95          BMP:  Recent Labs   Lab 03/15/24  0430   *      K 3.7   CL 96   CO2 26   BUN 57*   CREATININE 6.3*   CALCIUM 9.3   MG 2.5       LFT:  Lab Results   Component Value Date     AST 56 (H) 03/11/2024    ALKPHOS 208 (H) 03/11/2024    BILITOT 0.4 03/11/2024     Albumin:   Albumin   Date Value Ref Range Status   03/11/2024 2.5 (L) 3.5 - 5.2 g/dL Final     Protein:   Total Protein   Date Value Ref Range Status   03/11/2024 5.4 (L) 6.0 - 8.4 g/dL Final     Lactic acid:   Lab Results   Component Value Date    LACTATE 1.8 03/08/2024    LACTATE 2.5 (H) 03/07/2024       Significant Imaging: I have reviewed all pertinent imaging results/findings within the past 24 hours.

## 2024-03-15 NOTE — SUBJECTIVE & OBJECTIVE
Interval History: No acute events.  Vent and hemodynamics stable.  Withdrawal in the legs today, but otherwise no change in neuro exam.      Objective:     Vital Signs (Most Recent):  Temp: 98.3 °F (36.8 °C) (03/15/24 0705)  Pulse: 79 (03/15/24 0900)  Resp: 14 (03/15/24 0900)  BP: (!) 147/68 (03/15/24 0900)  SpO2: 97 % (03/15/24 0900) Vital Signs (24h Range):  Temp:  [97.9 °F (36.6 °C)-99 °F (37.2 °C)] 98.3 °F (36.8 °C)  Pulse:  [70-87] 79  Resp:  [8-21] 14  SpO2:  [92 %-100 %] 97 %  BP: (108-183)/(54-89) 147/68     Weight: 56.3 kg (124 lb 1.9 oz)  Body mass index is 20.65 kg/m².      Intake/Output Summary (Last 24 hours) at 3/15/2024 0950  Last data filed at 3/14/2024 2300  Gross per 24 hour   Intake 330 ml   Output --   Net 330 ml        Physical Exam  Vitals and nursing note reviewed.   Constitutional:       General: She is not in acute distress.     Comments: Intubated, no sedation, no response to voice   Cardiovascular:      Rate and Rhythm: Normal rate and regular rhythm.      Heart sounds: No murmur heard.  Pulmonary:      Effort: Pulmonary effort is normal. No respiratory distress.      Breath sounds: No wheezing, rhonchi or rales.   Abdominal:      General: Abdomen is flat. There is no distension.      Palpations: Abdomen is soft.      Tenderness: There is no abdominal tenderness.   Musculoskeletal:      Right lower leg: Edema present.      Left lower leg: Edema present.   Neurological:      Comments: Left pupil is sluggish, right eye blind, faint posturing in BUE, withdrawal in BLE today, delayed and weak cough           Review of Systems    Vents:  Vent Mode: A/C (03/15/24 0853)  Set Rate: 14 BPM (03/15/24 0853)  Vt Set: 360 mL (03/15/24 0853)  Pressure Support: 7 cmH20 (03/13/24 1135)  PEEP/CPAP: 5 cmH20 (03/15/24 0853)  Oxygen Concentration (%): 25 (03/15/24 0900)  Peak Airway Pressure: 29 cmH20 (03/15/24 0853)  Plateau Pressure: 19 cmH20 (03/15/24 0853)  Total Ve: 5.1 L/m (03/15/24 0853)  Negative  Inspiratory Force (cm H2O): 0 (03/15/24 0853)  F/VT Ratio<105 (RSBI): 141.51 (03/15/24 0853)    Lines/Drains/Airways       Central Venous Catheter Line  Duration             Percutaneous Central Line - Triple Lumen  03/07/24 1347 Internal Jugular Right 7 days              Drain  Duration                  NG/OG Tube 03/07/24 1847 Center mouth 7 days              Airway  Duration                  Airway - Non-Surgical 03/07/24 1259 7 days         Airway - Non-Surgical 03/07/24 1300 Endotracheal Tube 7 days              Peripheral Intravenous Line  Duration                  Hemodialysis AV Fistula 04/01/20 2203 Left upper arm 1443 days                    Significant Labs:    CBC/Anemia Profile:  Recent Labs   Lab 03/14/24  0450 03/15/24  0430   WBC 9.30 8.77   HGB 8.5* 8.4*   HCT 25.6* 25.3*    211   MCV 95 95   RDW 15.0* 15.1*        Chemistries:  Recent Labs   Lab 03/14/24  0450 03/15/24  0430    138   K 3.9 3.7   CL 96 96   CO2 24 26   BUN 46* 57*   CREATININE 5.3* 6.3*   CALCIUM 9.0 9.3   MG 2.2 2.5   PHOS 3.5 3.3       All pertinent labs within the past 24 hours have been reviewed.    Significant Imaging:  I have reviewed all pertinent imaging results/findings within the past 24 hours.

## 2024-03-15 NOTE — ASSESSMENT & PLAN NOTE
- continue discussions and provide support  - DNR  - family meeting today (day 8 of intubation); family provided with pt's living will 3/13

## 2024-03-15 NOTE — PROGRESS NOTES
[facility] Int[facility] ([facility])  Critical Care Medicine  Progress Note    Patient Name: Avril Monzon  MRN: [id number]  Admission Date: 3/7/2024  Hospital Length of Stay: 8 days  Code Status: DNR  Attending Provider: Sebastien Olivo MD  Primary Care Provider: Rose Germain MD   Principal Problem: Cardiac arrest    Subjective:     HPI:  79yr old with CMP ef 45%, Severe pulmonary HTN PAP > 85, Valvular heart disease, DM, ESRD, CVA, Retinopathy was getting outpatient EUNICE.  She was given diprivan for the procedure and post procedure remained unresponsive, then during EKG went from tachycardia to asystole.   She was given one roud of Epi, several boluses of vasopressors by anesthesia and was resuscitated for a few minutes. Intubated and bough to ICU.   In ICU CVL was placed and she was placed on levo. She had two more VT / VF episodes requiring CPR, Amiodarone bolus was given in the previous arrest and amiodarone infusion was initiated.   An A line was placed and CT head and other orders were placed. Family was updated by Cardiology. She was also given Ca and bicarbonate times 2 during resuscitation.     Hospital/ICU Course:  3/8. ABG reviewed. Settings changed. Myoclonic activity. Suspect a longer arrest duration in recovery. Tele Neuro consult, Cont EEG and other test pending.  3/9: remains neurologically devastated on the vent, family request meeting at 1600 today, continue supportive care   3/10: no meaningful recovery from a neuro standpoint, remains off sedation on the vent, supportive care   3/11: neuro exam unchanged.  Vent and hemodynamics are stable.  3/12: Has cough today.  No withdrawal to pain.  Vent and hemodynamics stable.  3/13: weak cough, faint posturing bilaterally. Vent and hemodynamics stable.  3/14: Neuro exam largely unchanged.  Vent and hemodynamics stable.   3/15: No major changes.  Vent and hemodynamics stable.  Family meeting this afternoon.    Interval History: No acute events.   Vent and hemodynamics stable.  Withdrawal in the legs today, but otherwise no change in neuro exam.      Objective:     Vital Signs (Most Recent):  Temp: 98.3 °F (36.8 °C) (03/15/24 0705)  Pulse: 79 (03/15/24 0900)  Resp: 14 (03/15/24 0900)  BP: (!) 147/68 (03/15/24 0900)  SpO2: 97 % (03/15/24 0900) Vital Signs (24h Range):  Temp:  [97.9 °F (36.6 °C)-99 °F (37.2 °C)] 98.3 °F (36.8 °C)  Pulse:  [70-87] 79  Resp:  [8-21] 14  SpO2:  [92 %-100 %] 97 %  BP: (108-183)/(54-89) 147/68     Weight: 56.3 kg (124 lb 1.9 oz)  Body mass index is 20.65 kg/m².      Intake/Output Summary (Last 24 hours) at 3/15/2024 0950  Last data filed at 3/14/2024 2300  Gross per 24 hour   Intake 330 ml   Output --   Net 330 ml        Physical Exam  Vitals and nursing note reviewed.   Constitutional:       General: She is not in acute distress.     Comments: Intubated, no sedation, no response to voice   Cardiovascular:      Rate and Rhythm: Normal rate and regular rhythm.      Heart sounds: No murmur heard.  Pulmonary:      Effort: Pulmonary effort is normal. No respiratory distress.      Breath sounds: No wheezing, rhonchi or rales.   Abdominal:      General: Abdomen is flat. There is no distension.      Palpations: Abdomen is soft.      Tenderness: There is no abdominal tenderness.   Musculoskeletal:      Right lower leg: Edema present.      Left lower leg: Edema present.   Neurological:      Comments: Left pupil is sluggish, right eye blind, faint posturing in BUE, withdrawal in BLE today, delayed and weak cough           Review of Systems    Vents:  Vent Mode: A/C (03/15/24 0853)  Set Rate: 14 BPM (03/15/24 0853)  Vt Set: 360 mL (03/15/24 0853)  Pressure Support: 7 cmH20 (03/13/24 1135)  PEEP/CPAP: 5 cmH20 (03/15/24 0853)  Oxygen Concentration (%): 25 (03/15/24 0900)  Peak Airway Pressure: 29 cmH20 (03/15/24 0853)  Plateau Pressure: 19 cmH20 (03/15/24 0853)  Total Ve: 5.1 L/m (03/15/24 0853)  Negative Inspiratory Force (cm H2O): 0 (03/15/24  0853)  F/VT Ratio<105 (RSBI): 141.51 (03/15/24 0853)    Lines/Drains/Airways       Central Venous Catheter Line  Duration             Percutaneous Central Line - Triple Lumen  03/07/24 1347 Internal Jugular Right 7 days              Drain  Duration                  NG/OG Tube 03/07/24 1847 Center mouth 7 days              Airway  Duration                  Airway - Non-Surgical 03/07/24 1259 7 days         Airway - Non-Surgical 03/07/24 1300 Endotracheal Tube 7 days              Peripheral Intravenous Line  Duration                  Hemodialysis AV Fistula 04/01/20 2203 Left upper arm 1443 days                    Significant Labs:    CBC/Anemia Profile:  Recent Labs   Lab 03/14/24  0450 03/15/24  0430   WBC 9.30 8.77   HGB 8.5* 8.4*   HCT 25.6* 25.3*    211   MCV 95 95   RDW 15.0* 15.1*        Chemistries:  Recent Labs   Lab 03/14/24  0450 03/15/24  0430    138   K 3.9 3.7   CL 96 96   CO2 24 26   BUN 46* 57*   CREATININE 5.3* 6.3*   CALCIUM 9.0 9.3   MG 2.2 2.5   PHOS 3.5 3.3       All pertinent labs within the past 24 hours have been reviewed.    Significant Imaging:  I have reviewed all pertinent imaging results/findings within the past 24 hours.    ABG  Recent Labs   Lab 03/13/24  0355   PH 7.422   PO2 95   PCO2 35.9   HCO3 23.4*   BE -1     Assessment/Plan:     Neuro  Anoxic brain injury  - s/p cardiac arrest and suspected prolonged down time   - EEG with myoclonus and neuro eval done, continue keppra   - neuro checks, supportive care, avoid fevers   - Repeat CT without acute findings, though limited study  - grave prognosis  - palliative care following, ongoing family discussions   --- planning family meeting this afternoon    Myoclonic jerking  - see plan for anoxic injury    Cardiac/Vascular  * Cardiac arrest  - cardiology following  - supportive care  - avoid fevers   - see plan for anoxia     Acute on chronic combined systolic and diastolic heart failure  - per cards  - monitor  I&Os    Pulmonary hypertension  - severe PAP 88mmHg  - contributing to and complicating medical decision making    Renal/  ESRD (end stage renal disease)  - nephro following   - RRT per Nephro  --- dialyzed 3/13  - renally dose meds and avoid nephrotoxins    Endocrine  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, with long-term current use of insulin  - SSI/Accuchecks    Palliative Care  Goals of care, counseling/discussion  - continue discussions and provide support  - DNR  - family meeting today (day 8 of intubation); family provided with pt's living will 3/13      Critical Care Time: 34 minutes  Critical secondary to respiratory failure      Critical care was time spent personally by me on the following activities: development of treatment plan with patient or surrogate and bedside caregivers, discussions with consultants, evaluation of patient's response to treatment, examination of patient, ordering and performing treatments and interventions, ordering and review of laboratory studies, ordering and review of radiographic studies, pulse oximetry, re-evaluation of patient's condition. This critical care time did not overlap with that of any other provider or involve time for any procedures.     Sebastien Olivo MD  Critical Care Medicine  [facility] - Intensive [facility] ([facility])

## 2024-03-15 NOTE — ASSESSMENT & PLAN NOTE
-Code status: DNR/I.    -HCPOA: Patient's daughter Elizabeth Monzon and her sister Myriam Don HCPOA.   -GOC:  Focus on extending life as long as possible, even it it means sacrificing quality. Accordingly, we have decided that the best plan to meet the patient's goals includes continuing with treatment. Family meeting today with patient's grand daughter Alex, patient's siblings, and multiple family members. Patient's daughter and grand daughter struggling with following patient's living will and patient's siblings and other family members advocating for patient and trying to support them to honor patient's wishes. We discuss extensively comfort focused treatment, palliative extubation, and inpatient hospice along with patient's living will in which grand daughter received another copy per request. PM team to follow up on Monday.   -Family meeting planned to follow up on Friday at 2PM.   -See HPI for further details

## 2024-03-15 NOTE — PLAN OF CARE
Hemodynamically stable.  Tolerated current Vent settings.  Decerebrate posturing when suctioning, minimal withdrawal to pain to extremities.  For family conference today.

## 2024-03-15 NOTE — ASSESSMENT & PLAN NOTE
-Patient remains intubated breathing over the vent, no sedation on board with no significant neurologic changes with anoxic brain injury in which neurology noted no chance for meaningful recovery. Patient with ESRD on HD as well and CHF. Patient with grave prognosis. Daughter reported functional and medical decline prior to EUNICE.   -Family meeting today in which we discussed goals of care extensively comfort focused treatment and palliative extubation in regards to honoring patient's wishes per her living will and provided medical updates for family. At this time daughter/HCPOA and grand daughter struggling with honoring patient's wishes and patient's sister/HCPOA and other multiple family members want to honor patient's wishes. Plan to follow up on Monday as no decisions were made today.   -DNR/I

## 2024-03-15 NOTE — CHAPLAIN
Provided support to pt's family throughout the day through listening, presence, and prayer.   Spiritual care remains available as needed.    Chaplain Jack Lopez M.Div., [facility]

## 2024-03-15 NOTE — PROGRESS NOTES
[facility] ([facility])  Nephrology  Progress Note    Patient Name: Avril Monzon  MRN: [id number]  Admission Date: 3/7/2024  Hospital Length of Stay: 8 days  Attending Provider: Sebastien Olivo MD   Primary Care Physician: Rose Germain MD  Principal Problem:Cardiac arrest    Subjective:     HPI: 79-year-old female with end-stage renal disease, admitted after having cardiac arrest during a transesophageal echocardiogram.  Nephrology has been consulted for maintenance of dialysis and end-stage renal disease related issues. Patient has been managed in the intensive care unit, Intubated on multiple drips.     03/09/2024:  Patient was seen in the intensive care unit.  In bed resting comfortably.  Remains intubated.  Events from overnight noted.  She is severely debilitated with no signs of meaningful recovery. She withdraws from pain, otherwise is not responsive.  Pt has a living will that says she does not want support after 5 days.  Follow with family meeting today. Will determine if she will undergo further dialysis.    Interval History:  Patient's examined in the intensive care unit.  She remains unresponsive withdrawn later pain.  She remains intubated.  Today is her regular scheduled dialysis day however we are holding it based on her living will document.    Follow with family discussion today at 2:00 p.m..    Review of patient's allergies indicates:   Allergen Reactions    Norvasc [amlodipine] Shortness Of Breath    Codeine Swelling     Other Reaction(s): Itchy skin eruption ([id number],SNOMED-CT)    Propoxyphene Swelling     Other Reaction(s): Itchy skin eruption (code-[id number],SNOMED-CT)    Atorvastatin      Muscle twitching     Current Facility-Administered Medications   Medication Frequency    acetaminophen oral solution 650 mg Q6H PRN    amiodarone tablet 200 mg BID    chlorhexidine 0.12 % solution 15 mL BID    dextrose 10% bolus 125 mL 125 mL PRN    dextrose 10% bolus 250 mL 250 mL  "PRN    fentaNYL 50 mcg/mL injection 25 mcg Q1H PRN    glucagon (human recombinant) injection 1 mg PRN    heparin (porcine) injection 5,000 Units Q8H    insulin aspart U-100 pen 0-10 Units Q4H PRN    insulin detemir U-100 (Levemir) pen 10 Units Daily    levETIRAcetam in NaCl (iso-os) IVPB 1,000 mg QHS    pantoprazole injection 40 mg Daily    polyethylene glycol packet 17 g Daily    sodium chloride 0.9% bolus 250 mL 250 mL PRN    sodium chloride 0.9% bolus 250 mL 250 mL PRN    sodium chloride 0.9% flush 10 mL PRN       Objective:     Vital Signs (Most Recent):  Temp: 98.3 °F (36.8 °C) (03/15/24 0705)  Pulse: 80 (03/15/24 0800)  Resp: 15 (03/15/24 0800)  BP: 139/64 (03/15/24 0800)  SpO2: 98 % (03/15/24 0800) Vital Signs (24h Range):  Temp:  [97.9 °F (36.6 °C)-99 °F (37.2 °C)] 98.3 °F (36.8 °C)  Pulse:  [70-87] 80  Resp:  [8-21] 15  SpO2:  [92 %-100 %] 98 %  BP: (108-183)/(54-89) 139/64     Weight: 56.3 kg (124 lb 1.9 oz) (03/15/24 0530)  Body mass index is 20.65 kg/m².  Body surface area is 1.61 meters squared.    I/O last 3 completed shifts:  In: 640 [NG/GT:440; IV Piggyback:200]  Out: 5 [Urine:5]     Physical Exam  Vitals reviewed.   Constitutional:       Appearance: She is ill-appearing.   Cardiovascular:      Rate and Rhythm: Normal rate and regular rhythm.   Pulmonary:      Comments: Mechanical airway noises auscultated anteriorly.  Abdominal:      Palpations: Abdomen is soft.   Musculoskeletal:      Comments: Functional left upper extremity AV fistula with good thrill and bruit.   Skin:     General: Skin is dry.          Significant Labs:  BMP:   Recent Labs   Lab 03/15/24  0430   *      K 3.7   CL 96   CO2 26   BUN 57*   CREATININE 6.3*   CALCIUM 9.3   MG 2.5     Cardiac Markers: No results for input(s): "CKMB", "TROPONINT", "MYOGLOBIN" in the last 168 hours.  All labs within the past 24 hours have been reviewed.       Assessment/Plan:     End-stage renal disease:    Today is a regular scheduled " dialysis today.   Fluid and electrolytes appear stable.  We are holding dialysis in lieu of a family meeting to decide whether or not they wish to transition to comfort measures.    Severe anoxic brain injury  Post arrest during EUNICE  Per Neurology and critical Care.  Palliative Care has been consulted.    At this point likelihood of meaningful recovery is minimal.  Continuing with dialysis would not be indicated.      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     Mild metabolic acidosis   Resolved.     Ventilatory failure   Remains intubated and managed on mechanical ventilation    Diabetes mellitus   Managed per ICU protocol    Adrian Rivera MD  Nephrology  [facility] ([facility])

## 2024-03-15 NOTE — ASSESSMENT & PLAN NOTE
- s/p cardiac arrest and suspected prolonged down time   - EEG with myoclonus and neuro eval done, continue keppra   - neuro checks, supportive care, avoid fevers   - Repeat CT without acute findings, though limited study  - grave prognosis  - palliative care following, ongoing family discussions   --- planning family meeting this afternoon

## 2024-03-16 VITALS
HEIGHT: 65 IN | TEMPERATURE: 99 F | BODY MASS INDEX: 20.94 KG/M2 | WEIGHT: 125.69 LBS | OXYGEN SATURATION: 100 % | DIASTOLIC BLOOD PRESSURE: 58 MMHG | SYSTOLIC BLOOD PRESSURE: 132 MMHG | HEART RATE: 84 BPM | RESPIRATION RATE: 17 BRPM

## 2024-03-16 PROCEDURE — 99900026 HC AIRWAY MAINTENANCE (STAT): Mod: HCNC

## 2024-03-16 PROCEDURE — 27100171 HC OXYGEN HIGH FLOW UP TO 24 HOURS: Mod: HCNC

## 2024-03-16 PROCEDURE — 94003 VENT MGMT INPAT SUBQ DAY: CPT | Mod: HCNC

## 2024-03-16 PROCEDURE — 94761 N-INVAS EAR/PLS OXIMETRY MLT: CPT | Mod: HCNC

## 2024-03-16 PROCEDURE — 63600175 PHARM REV CODE 636 W HCPCS: Mod: HCNC

## 2024-03-16 PROCEDURE — 63600175 PHARM REV CODE 636 W HCPCS: Mod: HCNC | Performed by: INTERNAL MEDICINE

## 2024-03-16 PROCEDURE — 99900035 HC TECH TIME PER 15 MIN (STAT): Mod: HCNC

## 2024-03-16 PROCEDURE — 99231 SBSQ HOSP IP/OBS SF/LOW 25: CPT | Mod: HCNC,,, | Performed by: INTERNAL MEDICINE

## 2024-03-16 RX ADMIN — MORPHINE SULFATE 2 MG: 4 INJECTION INTRAVENOUS at 12:03

## 2024-03-16 RX ADMIN — MIDAZOLAM 2 MG: 1 INJECTION INTRAMUSCULAR; INTRAVENOUS at 12:03

## 2024-03-16 RX ADMIN — GLYCOPYRROLATE 0.2 MG: 0.2 INJECTION, SOLUTION INTRAMUSCULAR; INTRAVITREAL at 01:03

## 2024-03-16 RX ADMIN — GLYCOPYRROLATE 0.2 MG: 0.2 INJECTION, SOLUTION INTRAMUSCULAR; INTRAVITREAL at 05:03

## 2024-03-16 NOTE — PLAN OF CARE
Problem: Palliative Care  Goal: Enhanced Quality of Life  Outcome: Ongoing, Progressing     Patient comfort maintained.   Ventilator settings maintained  For palliative extubation today.

## 2024-03-16 NOTE — ASSESSMENT & PLAN NOTE
- s/p cardiac arrest and suspected prolonged down time   - EEG with myoclonus and neuro eval done, continue keppra   - neuro checks, supportive care, avoid fevers   - Repeat CT without acute findings, though limited study  - grave prognosis  - appreciate Palliative Care assistance  - palliative extubation today

## 2024-03-16 NOTE — NURSING
Family at bedside, affectionate with patient.   revisited room to pray with family.  Dr Olivo called to bedside, family ready to palliatively extubate

## 2024-03-16 NOTE — PROGRESS NOTES
[facility] ([facility])  Nephrology  Progress Note    Patient Name: Avril Monzon  MRN: [id number]  Admission Date: 3/7/2024  Hospital Length of Stay: 9 days  Attending Provider: Sebastien Olivo MD   Primary Care Physician: Rose Germain MD  Principal Problem:Cardiac arrest    Subjective:     HPI: 79-year-old female with end-stage renal disease, admitted after having cardiac arrest during a transesophageal echocardiogram.  Nephrology has been consulted for maintenance of dialysis and end-stage renal disease related issues. Patient has been managed in the intensive care unit, Intubated on multiple drips.     03/09/2024:  Patient was seen in the intensive care unit.  In bed resting comfortably.  Remains intubated.  Events from overnight noted.  She is severely debilitated with no signs of meaningful recovery. She withdraws from pain, otherwise is not responsive.  Pt has a living will that says she does not want support after 5 days.      Interval History:  Events of yesterday reviewed.  Family now monitoring patient's living will in we will keep her comfortable.  Plan for palliative extubation today.  No indication for dialysis.  Given her wishes stated in her living will, we will not plan for further dialysis treatments.    Review of patient's allergies indicates:   Allergen Reactions    Norvasc [amlodipine] Shortness Of Breath    Codeine Swelling     Other Reaction(s): Itchy skin eruption (code-[id number],SNOMED-CT)    Propoxyphene Swelling     Other Reaction(s): Itchy skin eruption (code-[id number],SNOMED-CT)    Atorvastatin      Muscle twitching     Current Facility-Administered Medications   Medication Frequency    acetaminophen oral solution 650 mg Q6H PRN    amiodarone tablet 200 mg BID    dextrose 10% bolus 125 mL 125 mL PRN    dextrose 10% bolus 250 mL 250 mL PRN    fentaNYL 50 mcg/mL injection 25 mcg Q1H PRN    insulin detemir U-100 (Levemir) pen 20 Units Daily    levETIRAcetam in NaCl  (iso-os) IVPB 1,000 mg QHS    midazolam (VERSED) 1 mg/mL injection 2 mg Q15 Min PRN    morphine injection 4 mg Q15 Min PRN    pantoprazole injection 40 mg Daily    sodium chloride 0.9% bolus 250 mL 250 mL PRN    sodium chloride 0.9% bolus 250 mL 250 mL PRN    sodium chloride 0.9% flush 10 mL PRN       Objective:     Vital Signs (Most Recent):  Temp: 99.8 °F (37.7 °C) (03/15/24 1905)  Pulse: 84 (03/16/24 0922)  Resp: 15 (03/16/24 0922)  BP: (!) 141/65 (03/16/24 0605)  SpO2: 100 % (03/16/24 0922) Vital Signs (24h Range):  Temp:  [98.5 °F (36.9 °C)-99.8 °F (37.7 °C)] 99.8 °F (37.7 °C)  Pulse:  [66-91] 84  Resp:  [14-22] 15  SpO2:  [96 %-100 %] 100 %  BP: (121-183)/(58-81) 141/65     Weight: 57 kg (125 lb 10.6 oz) (03/16/24 0600)  Body mass index is 20.91 kg/m².  Body surface area is 1.62 meters squared.    I/O last 3 completed shifts:  In: 458.4 [NG/GT:265; IV Piggyback:193.4]  Out: -      Physical Exam  Vitals reviewed.   Constitutional:       Comments: Critically ill patient managed in ICU on mechanical ventilation.   Cardiovascular:      Rate and Rhythm: Normal rate and regular rhythm.   Pulmonary:      Effort: Pulmonary effort is normal.      Breath sounds: Normal breath sounds.   Abdominal:      Palpations: Abdomen is soft.   Musculoskeletal:         General: No swelling.          Significant Labs:  CBC:   Recent Labs   Lab 03/15/24  0430   WBC 8.77   RBC 2.67*   HGB 8.4*   HCT 25.3*      MCV 95   MCH 31.5*   MCHC 33.2     CMP:   Recent Labs   Lab 03/11/24  0433 03/12/24  0415 03/15/24  0430   *   < > 235*   CALCIUM 8.3*   < > 9.3   ALBUMIN 2.5*  --   --    PROT 5.4*  --   --       < > 138   K 4.3   < > 3.7   CO2 21*   < > 26   CL 94*   < > 96   BUN 62*   < > 57*   CREATININE 7.4*   < > 6.3*   ALKPHOS 208*  --   --    ALT 29  --   --    AST 56*  --   --    BILITOT 0.4  --   --     < > = values in this interval not displayed.     All labs within the past 24 hours have been reviewed.        Assessment/Plan:     End-stage renal disease:    Fluid and electrolytes appear stable.  We we will not treat further with dialysis as patient is moved to comfort measures.     Severe anoxic brain injury  Post arrest during EUNICE  Per Neurology and critical Care.  Palliative Care following    At this point likelihood of meaningful recovery is minimal.       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     Ventilatory failure   To be extubated today      Adrian Rivera MD  Nephrology  [facility] ([facility])

## 2024-03-16 NOTE — DISCHARGE SUMMARY
[facility] ([facility])  Critical Care Medicine  Discharge Summary      Patient Name: Avril Monzon  MRN: [id number]  Admission Date: 3/7/2024  Hospital Length of Stay: 9 days  Discharge Date and Time: No discharge date for patient encounter.  Attending Physician: Sebastien Olivo MD   Discharging Provider: Sebastien Olivo MD  Primary Care Provider: Rose Germain MD  Reason for Admission: cardiac arrest    HPI:   79yr old with CMP ef 45%, Severe pulmonary HTN PAP > 85, Valvular heart disease, DM, ESRD, CVA, Retinopathy was getting outpatient EUNICE.  She was given diprivan for the procedure and post procedure remained unresponsive, then during EKG went from tachycardia to asystole.   She was given one roud of Epi, several boluses of vasopressors by anesthesia and was resuscitated for a few minutes. Intubated and bough to ICU.   In ICU CVL was placed and she was placed on levo. She had two more VT / VF episodes requiring CPR, Amiodarone bolus was given in the previous arrest and amiodarone infusion was initiated.   An A line was placed and CT head and other orders were placed. Family was updated by Cardiology. She was also given Ca and bicarbonate times 2 during resuscitation.     Procedure(s) (LRB):  ECHOCARDIOGRAM, TRANSESOPHAGEAL (N/A)    Indwelling Lines/Drains at Time of Discharge:   Lines/Drains/Airways       Central Venous Catheter Line  Duration             Percutaneous Central Line - Triple Lumen  03/07/24 1347 Internal Jugular Right 9 days              Drain  Duration                  Hemodialysis AV Fistula 04/01/20 2203 Left upper arm 1444 days              Airway  Duration                  Airway - Non-Surgical 03/07/24 1300 Endotracheal Tube 9 days                  Hospital Course:   3/8. ABG reviewed. Settings changed. Myoclonic activity. Suspect a longer arrest duration in recovery. Tele Neuro consult, Cont EEG and other test pending.  3/9: remains neurologically devastated on the  vent, family request meeting at 1600 today, continue supportive care   3/10: no meaningful recovery from a neuro standpoint, remains off sedation on the vent, supportive care   3/11: neuro exam unchanged.  Vent and hemodynamics are stable.  3/12: Has cough today.  No withdrawal to pain.  Vent and hemodynamics stable.  3/13: weak cough, faint posturing bilaterally. Vent and hemodynamics stable.  3/14: Neuro exam largely unchanged.  Vent and hemodynamics stable.   3/15: No major changes.  Vent and hemodynamics stable.  Family meeting this afternoon.  3/16: Palliative extubation around noon today.  Family at bedside.  Full comfort measures in place.    Discharge to inpatient hospice.    Consults (From admission, onward)          Status Ordering Provider     Inpatient consult to Social Work  Once        Provider:  (Not yet assigned)    Completed MAGUI WHITE     Inpatient consult to Registered Dietitian/Nutritionist  Once        Provider:  (Not yet assigned)    Completed DALILA JEREZ     Consult to Pharmacogenomics  Once        Provider:  (Not yet assigned)    Acknowledged DALILA JEREZ     Inpatient consult to Palliative Care  Once        Provider:  Carissa Blanca NP    Completed DALILA JEREZ     Inpatient consult to Telemedicine-General Neurology  Once        Provider:  Mandy Chicas MD    Completed DALILA JEREZ     Inpatient consult to Nephrology  Once        Provider:  Adrian Ramirez MD    Completed DALILA JEREZ          Significant Labs:  All pertinent labs within the past 24 hours have been reviewed.    Significant Imaging:  I have reviewed all pertinent imaging results/findings within the past 24 hours.    Pending Diagnostic Studies:       None          Final Active Diagnoses:    Diagnosis Date Noted POA    PRINCIPAL PROBLEM:  Cardiac arrest [I46.9] 03/07/2024 Yes    Encounter for palliative care [Z51.5] 03/13/2024 Not Applicable    Epilepsy [G40.909] 03/08/2024  No    Anoxic brain injury [G93.1] 03/08/2024 No    Goals of care, counseling/discussion [Z71.89] 01/25/2023 Not Applicable    Acute on chronic combined systolic and diastolic heart failure [I50.43] 12/20/2021 Yes    Myoclonic jerking [G25.3] 12/20/2021 Yes    Pulmonary hypertension [I27.20] 11/28/2018 Yes    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, with long-term current use of insulin [E11.3553, Z79.4] 05/02/2016 Not Applicable    ESRD (end stage renal disease) [N18.6]  Yes      Problems Resolved During this Admission:     Neuro  Anoxic brain injury  - s/p cardiac arrest and suspected prolonged down time   - EEG with myoclonus and neuro eval done, continue keppra   - neuro checks, supportive care, avoid fevers   - Repeat CT without acute findings, though limited study  - grave prognosis  - appreciate Palliative Care assistance  - palliative extubation today    Myoclonic jerking  - see plan for anoxic injury    Cardiac/Vascular  * Cardiac arrest  - cardiology following  - supportive care  - avoid fevers   - see plan for anoxia     Acute on chronic combined systolic and diastolic heart failure  - per cards  - monitor I&Os    Pulmonary hypertension  - severe PAP 88mmHg  - contributing to and complicating medical decision making    Renal/  ESRD (end stage renal disease)  - nephro following   - RRT per Nephro  --- dialyzed 3/13  - renally dose meds and avoid nephrotoxins  - no further dialysis given [facility]    Endocrine  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, with long-term current use of insulin  - stopped accuchecks given [facility]    Palliative Care  Goals of care, counseling/discussion  - continue discussions and provide support  - DNR  - extubation today with full comfort measures  - discharge to inpatient hospice facility      Discharged Condition: poor    Disposition: Hospice/Medical Facility      Patient Instructions:   No discharge procedures on  file.  Medications:  None     Sebastien Olivo MD  Critical Care Medicine  [facility] - Intensive Care ([facility])

## 2024-03-16 NOTE — PLAN OF CARE
I made phone contact with Trinity Perez of [place] at [phone].  She is reviewing the patient's referral in [facility] & she's going to reach out to family.  We confirmed contact information for them.

## 2024-03-16 NOTE — ASSESSMENT & PLAN NOTE
- nephro following   - RRT per Nephro  --- dialyzed 3/13  - renally dose meds and avoid nephrotoxins  - no further dialysis given [facility]

## 2024-03-16 NOTE — PLAN OF CARE
[facility] (Hospital)  Discharge Final Note    Primary Care Provider: Rose Germain MD    Expected Discharge Date:     Final Discharge Note (most recent)       Final Note - 03/16/24 1710          Final Note    Assessment Type Final Discharge Note     Anticipated Discharge Disposition Hospice/Medical Facility        Post-Acute Status    Discharge Delays Ambulance Transport/Facility Transport                     Important Message from Medicare      Discharge to [facility] with [facility].

## 2024-03-16 NOTE — ASSESSMENT & PLAN NOTE
- continue discussions and provide support  - DNR  - extubation today with full comfort measures  - discharge to inpatient hospice facility

## 2024-03-16 NOTE — PROGRESS NOTES
[facility] - Intensive Care ([facility])  Critical Care Medicine  Progress Note    Patient Name: Avril Monzon  MRN: [id number]  Admission Date: 3/7/2024  Hospital Length of Stay: 9 days  Code Status: DNR  Attending Provider: Sebastien Olivo MD  Primary Care Provider: Rose Germain MD   Principal Problem: Cardiac arrest    Subjective:     HPI:  79yr old with CMP ef 45%, Severe pulmonary HTN PAP > 85, Valvular heart disease, DM, ESRD, CVA, Retinopathy was getting outpatient EUNICE.  She was given diprivan for the procedure and post procedure remained unresponsive, then during EKG went from tachycardia to asystole.   She was given one roud of Epi, several boluses of vasopressors by anesthesia and was resuscitated for a few minutes. Intubated and bough to ICU.   In ICU CVL was placed and she was placed on levo. She had two more VT / VF episodes requiring CPR, Amiodarone bolus was given in the previous arrest and amiodarone infusion was initiated.   An A line was placed and CT head and other orders were placed. Family was updated by Cardiology. She was also given Ca and bicarbonate times 2 during resuscitation.     Hospital/ICU Course:  3/8. ABG reviewed. Settings changed. Myoclonic activity. Suspect a longer arrest duration in recovery. Tele Neuro consult, Cont EEG and other test pending.  3/9: remains neurologically devastated on the vent, family request meeting at 1600 today, continue supportive care   3/10: no meaningful recovery from a neuro standpoint, remains off sedation on the vent, supportive care   3/11: neuro exam unchanged.  Vent and hemodynamics are stable.  3/12: Has cough today.  No withdrawal to pain.  Vent and hemodynamics stable.  3/13: weak cough, faint posturing bilaterally. Vent and hemodynamics stable.  3/14: Neuro exam largely unchanged.  Vent and hemodynamics stable.   3/15: No major changes.  Vent and hemodynamics stable.  Family meeting this afternoon.  3/16: Palliative extubation around noon  today.  Family at bedside.  Full comfort measures in place.    Interval History: No acute events.   Family arrived this morning and we proceeded with palliative extubation around noon.  Full comfort measures in place.      Objective:     Vital Signs (Most Recent):  Temp: 99 °F (37.2 °C) (03/16/24 0700)  Pulse: 87 (03/16/24 1400)  Resp: 16 (03/16/24 1400)  BP: (!) 132/58 (03/16/24 1206)  SpO2: 100 % (03/16/24 1400) Vital Signs (24h Range):  Temp:  [98.5 °F (36.9 °C)-99.8 °F (37.7 °C)] 99 °F (37.2 °C)  Pulse:  [66-91] 87  Resp:  [14-25] 16  SpO2:  [86 %-100 %] 100 %  BP: (117-183)/(56-81) 132/58     Weight: 57 kg (125 lb 10.6 oz)  Body mass index is 20.91 kg/m².      Intake/Output Summary (Last 24 hours) at 3/16/2024 1444  Last data filed at 3/16/2024 0600  Gross per 24 hour   Intake 118.41 ml   Output --   Net 118.41 ml        Physical Exam  Vitals and nursing note reviewed.   Constitutional:       Comments: Intubated, no sedation   Cardiovascular:      Rate and Rhythm: Normal rate and regular rhythm.      Pulses: Normal pulses.      Heart sounds: No murmur heard.  Pulmonary:      Effort: Pulmonary effort is normal. No respiratory distress.      Breath sounds: No wheezing, rhonchi or rales.   Abdominal:      General: Abdomen is flat. There is no distension.      Palpations: Abdomen is soft.      Tenderness: There is no abdominal tenderness.   Musculoskeletal:      Right lower leg: Edema present.      Left lower leg: Edema present.   Neurological:      Comments: No response to voice, intermittent breathing pattern on PSV, cough intact           Review of Systems    Vents:  Vent Mode: A/C (03/16/24 0922)  Set Rate: 14 BPM (03/16/24 0922)  Vt Set: 360 mL (03/16/24 0922)  Pressure Support: 7 cmH20 (03/13/24 1135)  PEEP/CPAP: 5 cmH20 (03/16/24 0922)  Oxygen Concentration (%): 25 (03/16/24 1206)  Peak Airway Pressure: 23 cmH20 (03/16/24 0922)  Plateau Pressure: 19 cmH20 (03/16/24 0922)  Total Ve: 5.71 L/m (03/16/24  0922)  Negative Inspiratory Force (cm H2O): 0 (03/16/24 0922)  F/VT Ratio<105 (RSBI): (!) 40.21 (03/16/24 0922)    Lines/Drains/Airways       Central Venous Catheter Line  Duration             Percutaneous Central Line - Triple Lumen  03/07/24 1347 Internal Jugular Right 8 days              Drain  Duration                  NG/OG Tube 03/07/24 1847 Center mouth 8 days              Airway  Duration                  Airway - Non-Surgical 03/07/24 1300 Endotracheal Tube 9 days              Peripheral Intravenous Line  Duration                  Hemodialysis AV Fistula 04/01/20 2203 Left upper arm 1444 days                    Significant Labs:    CBC/Anemia Profile:  Recent Labs   Lab 03/15/24  0430   WBC 8.77   HGB 8.4*   HCT 25.3*      MCV 95   RDW 15.1*        Chemistries:  Recent Labs   Lab 03/15/24  0430      K 3.7   CL 96   CO2 26   BUN 57*   CREATININE 6.3*   CALCIUM 9.3   MG 2.5   PHOS 3.3       All pertinent labs within the past 24 hours have been reviewed.    Significant Imaging:  I have reviewed all pertinent imaging results/findings within the past 24 hours.    ABG  Recent Labs   Lab 03/13/24  0355   PH 7.422   PO2 95   PCO2 35.9   HCO3 23.4*   BE -1     Assessment/Plan:     Neuro  Anoxic brain injury  - s/p cardiac arrest and suspected prolonged down time   - EEG with myoclonus and neuro eval done, continue keppra   - neuro checks, supportive care, avoid fevers   - Repeat CT without acute findings, though limited study  - grave prognosis  - appreciate Palliative Care assistance  - palliative extubation today    Myoclonic jerking  - see plan for anoxic injury    Cardiac/Vascular  * Cardiac arrest  - cardiology following  - supportive care  - avoid fevers   - see plan for anoxia     Acute on chronic combined systolic and diastolic heart failure  - per cards  - monitor I&Os    Pulmonary hypertension  - severe PAP 88mmHg  - contributing to and complicating medical decision making    Renal/  ESRD  (end stage renal disease)  - nephro following   - RRT per Nephro  --- dialyzed 3/13  - renally dose meds and avoid nephrotoxins  - no further dialysis given [facility]    Endocrine  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, with long-term current use of insulin  - stopped accuchecks given [facility]    Palliative Care  Goals of care, counseling/discussion  - continue discussions and provide support  - DNR  - extubation today with full comfort measures       Sebastien Olivo MD  Critical Care Medicine  [facility] - Intensive [facility] ([facility])

## 2024-03-16 NOTE — PLAN OF CARE
Pt palliatively extubated today to room air around noon with family at bedside.  Pt's vitals and respirations stabilized, and family requests transport to [facility].  Acceptance orchestrated per STACEY, discharge orders in place.  Report called per this RN to [phone].  [facility] transportation initiated.  Will contact [facility] to notify when pt is on her way. Triple lumen to remain in place per [facility]'s request.

## 2024-03-16 NOTE — PLAN OF CARE
03/16/24 1412   Post-Acute Status   Post-Acute Authorization Hospice   Hospice Status Referrals Sent   Discharge Plan   Discharge Plan A Inpatient Hospice     Referral sent to [facility].

## 2024-03-16 NOTE — SUBJECTIVE & OBJECTIVE
Interval History: No acute events.   Family arrived this morning and we proceeded with palliative extubation around noon.  Full comfort measures in place.      Objective:     Vital Signs (Most Recent):  Temp: 99 °F (37.2 °C) (03/16/24 0700)  Pulse: 87 (03/16/24 1400)  Resp: 16 (03/16/24 1400)  BP: (!) 132/58 (03/16/24 1206)  SpO2: 100 % (03/16/24 1400) Vital Signs (24h Range):  Temp:  [98.5 °F (36.9 °C)-99.8 °F (37.7 °C)] 99 °F (37.2 °C)  Pulse:  [66-91] 87  Resp:  [14-25] 16  SpO2:  [86 %-100 %] 100 %  BP: (117-183)/(56-81) 132/58     Weight: 57 kg (125 lb 10.6 oz)  Body mass index is 20.91 kg/m².      Intake/Output Summary (Last 24 hours) at 3/16/2024 1444  Last data filed at 3/16/2024 0600  Gross per 24 hour   Intake 118.41 ml   Output --   Net 118.41 ml        Physical Exam  Vitals and nursing note reviewed.   Constitutional:       Comments: Intubated, no sedation   Cardiovascular:      Rate and Rhythm: Normal rate and regular rhythm.      Pulses: Normal pulses.      Heart sounds: No murmur heard.  Pulmonary:      Effort: Pulmonary effort is normal. No respiratory distress.      Breath sounds: No wheezing, rhonchi or rales.   Abdominal:      General: Abdomen is flat. There is no distension.      Palpations: Abdomen is soft.      Tenderness: There is no abdominal tenderness.   Musculoskeletal:      Right lower leg: Edema present.      Left lower leg: Edema present.   Neurological:      Comments: No response to voice, intermittent breathing pattern on PSV, cough intact           Review of Systems    Vents:  Vent Mode: A/C (03/16/24 0922)  Set Rate: 14 BPM (03/16/24 0922)  Vt Set: 360 mL (03/16/24 0922)  Pressure Support: 7 cmH20 (03/13/24 1135)  PEEP/CPAP: 5 cmH20 (03/16/24 0922)  Oxygen Concentration (%): 25 (03/16/24 1206)  Peak Airway Pressure: 23 cmH20 (03/16/24 0922)  Plateau Pressure: 19 cmH20 (03/16/24 0922)  Total Ve: 5.71 L/m (03/16/24 0922)  Negative Inspiratory Force (cm H2O): 0 (03/16/24 0922)  F/VT  Ratio<105 (RSBI): (!) 40.21 (03/16/24 0922)    Lines/Drains/Airways       Central Venous Catheter Line  Duration             Percutaneous Central Line - Triple Lumen  03/07/24 1347 Internal Jugular Right 8 days              Drain  Duration                  NG/OG Tube 03/07/24 1847 Center mouth 8 days              Airway  Duration                  Airway - Non-Surgical 03/07/24 1300 Endotracheal Tube 9 days              Peripheral Intravenous Line  Duration                  Hemodialysis AV Fistula 04/01/20 2203 Left upper arm 1444 days                    Significant Labs:    CBC/Anemia Profile:  Recent Labs   Lab 03/15/24  0430   WBC 8.77   HGB 8.4*   HCT 25.3*      MCV 95   RDW 15.1*        Chemistries:  Recent Labs   Lab 03/15/24  0430      K 3.7   CL 96   CO2 26   BUN 57*   CREATININE 6.3*   CALCIUM 9.3   MG 2.5   PHOS 3.3       All pertinent labs within the past 24 hours have been reviewed.    Significant Imaging:  I have reviewed all pertinent imaging results/findings within the past 24 hours.

## 2024-03-16 NOTE — SUBJECTIVE & OBJECTIVE
Interval History:  Events of yesterday reviewed.  Family now monitoring patient's living will in we will keep her comfortable.  Plan for palliative extubation today.  No indication for dialysis.  Given her wishes stated in her living will, we will not plan for further dialysis treatments.    Review of patient's allergies indicates:   Allergen Reactions    Norvasc [amlodipine] Shortness Of Breath    Codeine Swelling     Other Reaction(s): Itchy skin eruption ([id number],SNOMED-CT)    Propoxyphene Swelling     Other Reaction(s): Itchy skin eruption (code-[id number],SNOMED-CT)    Atorvastatin      Muscle twitching     Current Facility-Administered Medications   Medication Frequency    acetaminophen oral solution 650 mg Q6H PRN    amiodarone tablet 200 mg BID    dextrose 10% bolus 125 mL 125 mL PRN    dextrose 10% bolus 250 mL 250 mL PRN    fentaNYL 50 mcg/mL injection 25 mcg Q1H PRN    insulin detemir U-100 (Levemir) pen 20 Units Daily    levETIRAcetam in NaCl (iso-os) IVPB 1,000 mg QHS    midazolam (VERSED) 1 mg/mL injection 2 mg Q15 Min PRN    morphine injection 4 mg Q15 Min PRN    pantoprazole injection 40 mg Daily    sodium chloride 0.9% bolus 250 mL 250 mL PRN    sodium chloride 0.9% bolus 250 mL 250 mL PRN    sodium chloride 0.9% flush 10 mL PRN       Objective:     Vital Signs (Most Recent):  Temp: 99.8 °F (37.7 °C) (03/15/24 1905)  Pulse: 84 (03/16/24 0922)  Resp: 15 (03/16/24 0922)  BP: (!) 141/65 (03/16/24 0605)  SpO2: 100 % (03/16/24 0922) Vital Signs (24h Range):  Temp:  [98.5 °F (36.9 °C)-99.8 °F (37.7 °C)] 99.8 °F (37.7 °C)  Pulse:  [66-91] 84  Resp:  [14-22] 15  SpO2:  [96 %-100 %] 100 %  BP: (121-183)/(58-81) 141/65     Weight: 57 kg (125 lb 10.6 oz) (03/16/24 0600)  Body mass index is 20.91 kg/m².  Body surface area is 1.62 meters squared.    I/O last 3 completed shifts:  In: 458.4 [NG/GT:265; IV Piggyback:193.4]  Out: -      Physical Exam  Vitals reviewed.   Constitutional:       Comments: Critically  ill patient managed in ICU on mechanical ventilation.   Cardiovascular:      Rate and Rhythm: Normal rate and regular rhythm.   Pulmonary:      Effort: Pulmonary effort is normal.      Breath sounds: Normal breath sounds.   Abdominal:      Palpations: Abdomen is soft.   Musculoskeletal:         General: No swelling.          Significant Labs:  CBC:   Recent Labs   Lab 03/15/24  0430   WBC 8.77   RBC 2.67*   HGB 8.4*   HCT 25.3*      MCV 95   MCH 31.5*   MCHC 33.2     CMP:   Recent Labs   Lab 03/11/24  0433 03/12/24  0415 03/15/24  0430   *   < > 235*   CALCIUM 8.3*   < > 9.3   ALBUMIN 2.5*  --   --    PROT 5.4*  --   --       < > 138   K 4.3   < > 3.7   CO2 21*   < > 26   CL 94*   < > 96   BUN 62*   < > 57*   CREATININE 7.4*   < > 6.3*   ALKPHOS 208*  --   --    ALT 29  --   --    AST 56*  --   --    BILITOT 0.4  --   --     < > = values in this interval not displayed.     All labs within the past 24 hours have been reviewed.

## 2024-03-17 NOTE — NURSING
Pt D/C to [facility] via [facility]. Daughter at bedside and will follow pt to [facility].

## 2024-03-19 ENCOUNTER — TELEPHONE (OUTPATIENT)
Dept: PALLIATIVE MEDICINE | Facility: HOSPITAL | Age: 79
End: 2024-03-19
Payer: MEDICARE

## 2024-03-19 NOTE — TELEPHONE ENCOUNTER
Updated by Susana with [facility] that Mrs. Monzon passed away this morning. Chart updated and condolence card mailed.

## 2024-04-05 NOTE — NURSING
1205 - venipuncture performed to pts rt ac for cbc.  Pt tolerated well.   Patient's (Body mass index is 45.06 kg/m².) indicates that they are morbidly/severely obese (BMI > 40 or > 35 with obesity - related health condition) with health conditions that include hypertension . Weight is worsening. BMI  is above average; BMI management plan is completed. We discussed low calorie, low carb based diet program, portion control, and increasing exercise.

## 2024-04-10 ENCOUNTER — TELEPHONE (OUTPATIENT)
Dept: PRIMARY CARE CLINIC | Facility: CLINIC | Age: 79
End: 2024-04-10
Payer: MEDICARE

## 2024-08-23 NOTE — PROGRESS NOTES
Collaborating with OPCM RN. Pt is currently in [facility]. Unable to reach pt to complete SW assessment x 3 attempts.  Previous SW also made several attempts to complete assessment. LCSW will close and notify OPCM RN     Home

## 2024-11-29 NOTE — ASSESSMENT & PLAN NOTE
- BE improved, HCO3 22.9  - cont to monitor        Brian was seen in the ER today for dog bite to the lip/face. His laceration was repaired in the ER with absorbable sutures that do not need to be removed.     Keep the wound clean and dry. Do not submerge the wounds under water. Apply Bacitracin ointment twice/day. Take the antibiotic as prescribed and finish the entire 7 days. Return to the ER for evaluation if he starts to have significant swelling/pain, with redness, thick/white drainage from the wounds, or for any other concerns.     Follow up for wound reevaluation as noted above.

## 2024-12-02 NOTE — ASSESSMENT & PLAN NOTE
Patient is identified as Severe COVID-19 based on hypoxemia with O2 saturations <94% on room air or on ambulation   Initiate standard COVID protocols; COVID-19 testing ,Infection Control notification  and isolation- respiratory, contact and droplet per protocol    Diagnostics: (leukopenia, hyponatremia, hyperferritinemia, elevated troponin, elevated d-dimer, age, and comorbidities are significant predictors of poor clinical outcome)  CBC, CMP, Procalcitonin, Ferritin, CRP, LDH and Portable CXR    Management: Initiate targeted therapy with Remdesivir, 200mg IV x1, followed by 100mg IV daily x5 days total, Dexamethasone PO/IV 6mg daily x10 days and Anticoagulation, Patient admitted to non-critical care unit- Will initiate full dose anticoagulation with Unfractionated heparin, Inhaled bronchodilators as needed for shortness of breath. and Continuous cardiac monitoring.     Start remdesevir and decadron  S/p MAB infusion   Monitor inflammatory marker s   02-Dec-2024 01:05

## 2025-04-10 NOTE — PROGRESS NOTES
Avril Monzon  09/07/2023  [id number]    Rose Germain MD  Patient Care Team:  Rose Germain MD as PCP - General (Internal Medicine)  Conchita Garcia RD, CDE as Dietitian (Diabetes)  Debbie Wagner RD (Inactive) as Dietitian (Nutrition)  Angela Hanson MD as Consulting Physician (Nephrology)  Demetrio Mejia MD as Consulting Physician (Cardiology)  Conchita Garcia RD, CDE as Diabetes Educator (Diabetes)  Alina Bauer DPM as Consulting Physician (Podiatry)  Anette Montanez DPM as Consulting Physician (Podiatry)  JADIEL Coello MD as Consulting Physician (Ophthalmology)  Debbie Lopez, RN as Outpatient       [facility]      Chief Complaint:  Chief Complaint   Patient presents with    Shortness of Breath     Pt is complaining of shortness of breath and back pain.        History of Present Illness:  77 yo female ESRD on HD, combined heart failure, pulmonary HTN, DM II  C/o continued ALVARADO - walking through her house  She was hospitalized on 8/9/23 for decompensated HF and required HD.  The ALVARADO waxes and wanes.   Also, having flare of lumbago - no recent falls  Going to physical therapy - L2 & L5 - exercising and heat - feels better.  Tylenol arthritis - not helping  Stanback - not helping - advised against taking    Constipation has improved    Noted per chart that sometimes patient misses dialysis (attends M,W,F)  Last day at dialysis - yesterday  Has not missed any HD in last 2 weeks  Over the W/E she noted mild wheezing  HD removed 700 ml fluid    Denies cough, fever, chills, chest pain, palpitations, presyncopal symptoms.         The following were reviewed: Active problem list, medication list, allergies, family history, social history, and Health Maintenance.     History:  Past Medical History:   Diagnosis Date    Acute hypoxemic respiratory failure 11/26/2018    Anemia     Arthritis     back, hand, knees    Back pain     Cataract     CKD stage  G4/A3, GFR 15 - 29 and albumin creatinine ratio >300 mg/g     GFR 40% Jan 2014 and 33% ub 3/2014 (BRG)    Coronary artery disease involving native coronary artery of native heart 11/30/2018    Diabetic retinopathy     DM (diabetes mellitus) type II controlled, neurological manifestation     Exudative age-related macular degeneration of left eye with active choroidal neovascularization 2/5/2019    GERD (gastroesophageal reflux disease)     Glaucoma     Heart failure     Hyperlipidemia     Hypertension     Non-ST elevation MI (NSTEMI) 11/28/2018    NSTEMI (non-ST elevated myocardial infarction) 11/27/2018    Peripheral neuropathy     Polyneuropathy     Proteinuria     >6 mo     Seizures     BRG 1/2014 -dick CT NRI showed small vessel    Skin ulcer of toe of right foot with fat layer exposed 10/14/2022    Stroke 2012,2014    Tobacco dependence     Type 2 diabetes with peripheral circulatory disorder, controlled      Past Surgical History:   Procedure Laterality Date    BREAST LUMPECTOMY Left     benign, when patient was 15 years old    BREAST MASS EXCISION      ,benign, age 15.    CATHETERIZATION OF BOTH LEFT AND RIGHT HEART N/A 11/28/2018    Procedure: CATHETERIZATION, HEART, BOTH LEFT AND RIGHT;  Surgeon: Michelle Holman MD;  Location: [facility] CATH LAB;  Service: Cardiology;  Laterality: N/A;    CATHETERIZATION OF BOTH LEFT AND RIGHT HEART N/A 11/30/2018    Procedure: CATHETERIZATION, HEART, BOTH LEFT AND RIGHT;  Surgeon: Michelle Holman MD;  Location: [facility] CATH LAB;  Service: Cardiology;  Laterality: N/A;    CORONARY ARTERY BYPASS GRAFT      CORONARY ARTERY BYPASS GRAFT (CABG) N/A 7/20/2022    Procedure: CORONARY ARTERY BYPASS GRAFT (CABG);  Surgeon: Sanju Locke MD;  Location: [facility] OR;  Service: Cardiothoracic;  Laterality: N/A;  2-VESSEL PUMP ASSIST WITH EPI-AORTIC ULTRASOUND    ENDOSCOPIC HARVEST OF VEIN Left 7/20/2022    Procedure: SURGICAL PROCUREMENT, VEIN, ENDOSCOPIC;  Surgeon: Sanju Locke MD;   Yes Location: [facility] OR;  Service: Cardiothoracic;  Laterality: Left;    HYSTERECTOMY  1982    INJECTION OF ANESTHETIC AGENT AROUND MULTIPLE INTERCOSTAL NERVES N/A 7/20/2022    Procedure: BLOCK, NERVE, INTERCOSTAL, 2 OR MORE;  Surgeon: Sanju Locke MD;  Location: [facility] OR;  Service: Cardiothoracic;  Laterality: N/A;  PARASTERNAL NERVE BLOCK    INSERTION OF SHUNT      LEFT HEART CATHETERIZATION Left 12/3/2018    Procedure: CATHETERIZATION, HEART, LEFT;  Surgeon: Michelle Holman MD;  Location: [facility] CATH LAB;  Service: Cardiology;  Laterality: Left;  LAD ATHERECTOMY STENT/ FEMORAL APPROACH    LEFT HEART CATHETERIZATION Left 7/13/2022    Procedure: CATHETERIZATION, HEART, LEFT;  Surgeon: Abhi Sloan MD;  Location: [facility] CATH LAB;  Service: Cardiology;  Laterality: Left;    OOPHORECTOMY      wrist cyst Right 1980s    dorsal wrist cyst     Family History   Problem Relation Age of Onset    Diabetes Mother     Hyperlipidemia Mother     Hypertension Mother     Heart disease Mother         MI    Muscular dystrophy Son     Cancer Maternal Grandmother         colon     Patient Active Problem List   Diagnosis    Constipation    Hypertension associated with diabetes    ESRD (end stage renal disease)    Proteinuria    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, with long-term current use of insulin    Cataracta brunescens of right eye    Bilateral carotid artery disease    Aortic atherosclerosis    Type 2 diabetes mellitus with circulatory disorder, with long-term current use of insulin    History of CVA (cerebrovascular accident)    Hyperparathyroidism    Vitamin D deficiency    DDD (degenerative disc disease), lumbar    Anemia in chronic kidney disease, on chronic dialysis    Iron deficiency anemia due to chronic blood loss    Hyperlipidemia associated with type 2 diabetes mellitus    Pulmonary hypertension    CAD (coronary artery disease)    Exudative age-related macular degeneration of  left eye with active choroidal neovascularization    Non-rheumatic mitral regurgitation    Non-rheumatic tricuspid valve insufficiency    Age-related nuclear cataract of left eye    DM type 2 with diabetic peripheral neuropathy    Unspecified inflammatory spondylopathy, lumbar region    Convulsions    Hemiplegia    Other specified complication of vascular prosthetic devices, implants and grafts, initial encounter    Chronic combined systolic and diastolic heart failure    Hyperkalemia    Metabolic acidosis    S/P CABG (coronary artery bypass graft)    COVID    Skin ulcer of toe of right foot with fat layer exposed    Type 2 diabetes mellitus with diabetic peripheral angiopathy without gangrene, with long-term current use of insulin    Allergic rhinitis    Other reduced mobility    Nausea & vomiting    Dysuria    Debility    Pleural effusion    Acute bilateral low back pain without sciatica     Review of patient's allergies indicates:   Allergen Reactions    Codeine Swelling    Darvon [propoxyphene] Swelling    Atorvastatin      Muscle twitching       Medications:  Current Outpatient Medications on File Prior to Visit   Medication Sig Dispense Refill    amLODIPine (NORVASC) 5 MG tablet Take 5 mg by mouth.      aspirin (ECOTRIN) 81 MG EC tablet Take 1 tablet (81 mg total) by mouth once daily. 90 tablet 6    atropine 1% (ISOPTO ATROPINE) 1 % Drop Place 1 drop into the left eye 3 (three) times daily.      azelastine (ASTELIN) 137 mcg (0.1 %) nasal spray 1 spray (137 mcg total) by Nasal route 2 (two) times daily. 30 mL 0    blood sugar diagnostic (TRUE METRIX GLUCOSE TEST STRIP) Strp 1 strip by Misc.(Non-Drug; Combo Route) route 3 (three) times daily. 100 each 11    blood-glucose meter (TRUE METRIX GLUCOSE METER) Misc Use as instructed 1 each 0    cyanocobalamin (VITAMIN B-12) 1000 MCG tablet Take 100 mcg by mouth once daily.      folic acid (FOLVITE) 1 MG tablet Take 1 mg by mouth once daily.      insulin aspart  "protamine-insulin aspart (NOVOLOG MIX 70-30FLEXPEN U-100) 100 unit/mL (70-30) InPn pen Inject 22 units into skin before breakfast, 10 units before dinner. On dialysis days, give 12 units before dinner. 15 mL 3    lancets (TRUEPLUS LANCETS) 33 gauge Misc 1 lancet by Misc.(Non-Drug; Combo Route) route 3 (three) times daily. 100 each 11    loratadine (CLARITIN) 10 mg tablet Take 1 tablet (10 mg total) by mouth once daily. 30 tablet 3    metoprolol tartrate (LOPRESSOR) 50 MG tablet Take 1 tablet (50 mg total) by mouth 2 (two) times daily. 180 tablet 11    ondansetron (ZOFRAN-ODT) 4 MG TbDL Take 1 tablet (4 mg total) by mouth every 8 (eight) hours as needed (nausea/vomiting). 12 tablet 0    pen needle, diabetic 32 gauge x 5/32" Ndle 1 each by Misc.(Non-Drug; Combo Route) route 2 (two) times a day. 100 each 11    rosuvastatin (CRESTOR) 20 MG tablet Take 1 tablet (20 mg total) by mouth once daily. 90 tablet 3    sevelamer carbonate (RENVELA) 800 mg Tab Take 3 tablets (2,400 mg) by mouth three times a day with meals and 2 tablets (1,600 mg) by mouth with snacks 1170 tablet 3    [DISCONTINUED] isosorbide-hydrALAZINE 20-37.5 mg (BIDIL) 20-37.5 mg Tab Take 1 tablet by mouth 3 (three) times daily. 90 tablet 1     No current facility-administered medications on file prior to visit.       Medications have been reviewed and reconciled with patient at visit today.      Exam:  Vitals:    09/07/23 0828   BP: (!) 144/50   Pulse: 64   Temp: 98.3 °F (36.8 °C)     Weight: 62.1 kg (136 lb 14.4 oz)   Body mass index is 23.5 kg/m².      BP Readings from Last 3 Encounters:   09/07/23 (!) 144/50   08/22/23 (!) 164/52   08/17/23 (!) 146/50     Wt Readings from Last 3 Encounters:   09/07/23 0828 62.1 kg (136 lb 14.4 oz)   08/31/23 1030 62.1 kg (137 lb)   08/22/23 1439 62.5 kg (137 lb 12.8 oz)        REVIEW OF SYSTEMS  Review of Systems   Constitutional:  Negative for chills, fever and malaise/fatigue.   HENT:  Negative for congestion, ear " discharge, ear pain and sore throat.    Respiratory:  Positive for shortness of breath. Negative for cough.    Cardiovascular:  Negative for chest pain, palpitations and leg swelling.   Gastrointestinal:  Negative for abdominal pain, diarrhea, nausea and vomiting.   Musculoskeletal:  Positive for back pain. Negative for myalgias.   Neurological:  Negative for dizziness, focal weakness and headaches.   Psychiatric/Behavioral:  Negative for depression. The patient is not nervous/anxious.        Physical Exam  Vitals reviewed.   Constitutional:       General: She is not in acute distress.     Appearance: Normal appearance.   HENT:      Head: Normocephalic and atraumatic.      Nose: Nose normal.      Mouth/Throat:      Mouth: Mucous membranes are moist.   Eyes:      General: No scleral icterus.     Conjunctiva/sclera: Conjunctivae normal.   Cardiovascular:      Rate and Rhythm: Normal rate and regular rhythm.      Heart sounds: No murmur heard.  Pulmonary:      Effort: Pulmonary effort is normal. No respiratory distress.      Breath sounds: Normal breath sounds. No wheezing or rales.      Comments: Mildly diminished - scant wheezing  Abdominal:      Palpations: Abdomen is soft. There is no mass.      Tenderness: There is no abdominal tenderness.   Musculoskeletal:         General: No swelling or deformity. Normal range of motion.      Cervical back: Normal range of motion and neck supple.      Right lower leg: No edema.      Left lower leg: No edema.      Comments: Paraspinal muscle tenderness to lumbar area   Lymphadenopathy:      Cervical: No cervical adenopathy.   Skin:     General: Skin is warm and dry.   Neurological:      Mental Status: She is alert and oriented to person, place, and time. Mental status is at baseline.   Psychiatric:         Mood and Affect: Mood normal.         Thought Content: Thought content normal.         Laboratory Reviewed:     Lab Results   Component Value Date    WBC 5.87 08/10/2023     HGB 8.8 (L) 08/10/2023    HCT 27.3 (L) 08/10/2023     08/10/2023    CHOL 171 07/13/2022    TRIG 46 07/13/2022    HDL 59 07/13/2022    ALT 5 (L) 08/10/2023    AST 9 (L) 08/10/2023     08/10/2023    K 4.6 08/10/2023    CL 99 08/10/2023    CREATININE 8.4 (H) 08/10/2023    BUN 51 (H) 08/10/2023    CO2 22 (L) 08/10/2023    TSH 1.686 08/04/2023    INR 1.0 08/07/2022    HGBA1C 6.6 (H) 04/04/2023       Screening or Prevention Patient's value Goal Complete/Controlled?   HgA1C Testing and Control   Lab Results   Component Value Date    HGBA1C 6.6 (H) 04/04/2023      Annually/Less than 8% Yes   Lipid profile : 06/28/2023 Annually No   LDL control Lab Results   Component Value Date    LDLCALC 102.8 07/13/2022    Annually/Less than 100 mg/dl  No   Nephropathy screening Lab Results   Component Value Date    LABMICR 1555.0 06/13/2018     Lab Results   Component Value Date    PROTEINUA 3+ (A) 08/26/2022    Annually No   Blood pressure BP Readings from Last 1 Encounters:   09/07/23 (!) 144/50    Less than 140/90 No   Dilated retinal exam : 09/01/2022 Annually No   Foot exam   : 03/08/2022 Annually No       Health Maintenance  Health Maintenance Topics with due status: Not Due       Topic Last Completion Date    TETANUS VACCINE 12/12/2021    Lipid Panel 06/28/2023     Health Maintenance Due   Topic Date Due    Shingles Vaccine (1 of 2) Never done    DEXA Scan  Never done    COVID-19 Vaccine (4 - Moderna series) 02/25/2022    Foot Exam  03/08/2023    Influenza Vaccine (1) 09/01/2023    Eye Exam  09/01/2023    Hemoglobin A1c  10/04/2023       Assessment and Plan:  1. Acute bilateral low back pain without sciatica  -     traMADoL (ULTRAM) 50 mg tablet; Take 1 tablet (50 mg total) by mouth every 6 (six) hours as needed for Pain.  Dispense: 30 each; Refill: 0  -     LIDOcaine (LIDODERM) 5 %; Place 1 patch onto the skin once daily.  Dispense: 15 patch; Refill: 0    2. Pleural effusion  -     X-Ray Chest PA And Lateral; Future;  Expected date: 09/07/2023  -     BNP; Future; Expected date: 09/07/2023    3. Pulmonary hypertension    4. S/P CABG (coronary artery bypass graft)    5. ESRD (end stage renal disease)  Overview:  Hx of unctontrolled HTN and DMII : Admission records reviewed from [facility] in Jan 2014 and March 2014 ; Creatinine was 1.4 in Jan 2014 and had proteinuria c/w HTN and DMII ; since then progressive decline in GFR with UTI from E.Coli ; USD in 9/2014 show chronic Medical Disease       6. Chronic combined systolic and diastolic heart failure  Has seen Pulmonology - met criteria for supplemental oxygen at home   9/26/2022  9:15 AM     Overnight Oxygen Saturation Study:     INTERPRETATION:  Conditions of Test: Noted above in report     Interpretation of results of overnight oxygen saturation study.  This was a technically  adequate study.  Overnight oxygen saturation study is abnormal with O2 saturation less than 89%.   Time with SpO2<89: 0:27:52, 8.5% of the study period. Lowest oxygen saturation recorded was 78% .     Based on the above information patient meets criteria for oxygen prescription.  Clinical correlation suggested.  Hattie JamesPatient's lab results were reviewed and discussed with patient  -Treatment options and alternatives were discussed with the patient. Patient expressed understanding. Patient was given the opportunity to ask questions and be an active participant in their medical care. Patient had no further questions or concerns at this time.         Future Appointments   Date Time Provider Department Center   9/14/2023 11:20 AM Nidhi Miller NP [facility]   9/20/2023  4:30 PM Natty Mistry NP [facility] DIABETE [facility]   11/15/2023 11:00 AM Natty Mistry NP [facility] DIABETE [facility]   12/1/2023 10:20 AM Rose Germain MD [facility]   12/5/2023  8:30 AM LABORATORY, [facility] LAB [facility]   12/15/2023  3:20 PM Michelle Holman MD [facility]  CARDIO [place]          After visit summary printed and given to patient upon discharge.  Patient goals and care plan are included in After visit summary.    Total medical decision making time was 31 min.    The following issues were discussed: The primary encounter diagnosis was Acute bilateral low back pain without sciatica. Diagnoses of Pleural effusion, Pulmonary hypertension, S/P CABG (coronary artery bypass graft), ESRD (end stage renal disease), and Chronic combined systolic and diastolic heart failure were also pertinent to this visit.    Health maintenance needs, recent test results and goals of care discussed with pt and questions answered.           Nidhi Miller, APRN, NP-C  [facility] [address] [place] [address]

## 2025-06-19 NOTE — LETTER
August 29, 2022    Avril GRANT Monzon  [address]  [place] [address]             [facility] [address] [place] [address] Dear Mrs Mackenzie,      I work with [facility] Outpatient Case Management Department. I have been unsuccessful at reaching you recently since we spoke on 8/18/22. If you require any future assistance or if any new concerns or problems arise, please call me.     The Outpatient Case Management Department can be reached at [phone] from 8 AM to 4:30 PM Monday thru Friday.    [facility] also has a program called [facility]) with a nurse available 24/7 to answer questions or provide medical advice for any non-emergent symptoms you may have. That number is [phone].     Kindest Regards,        Debbie Lopez RN  Outpatient Case Management  [phone]         
Thank you for seeing us in GI clinic.     We have ordered you for stool studies, please submit then at your convinience.     I have also ordered you for fiber suppliemntation, please mix into 8 ounces of water per day, this should help with your frequent stooling.     See us again in clinic in 2-3 months.   
0 = independent

## (undated) DEVICE — CONTAINER SPECIMEN OR STER 4OZ

## (undated) DEVICE — TOWEL OR DISP STRL BLUE 4/PK

## (undated) DEVICE — SUT VICRYL 2-0 36 CT-1

## (undated) DEVICE — POWDER ARISTA AH 3G

## (undated) DEVICE — DRESSING TRANS 4X4 TEGADERM

## (undated) DEVICE — SUT PERCLOSE PROSTYLE MEDIATE

## (undated) DEVICE — ORGNZR TUBING CLR W/CLIPS

## (undated) DEVICE — SOL 9P NACL IRR PIC IL

## (undated) DEVICE — SUT PROLENE 6-0 C-1 30IN BL

## (undated) DEVICE — TAPE RETRACT-O-TAPE

## (undated) DEVICE — SUT STEEL 7 MONO B&S18 CCS

## (undated) DEVICE — SUT PROLENE 5/0 RB-1 36 IN

## (undated) DEVICE — APPLIER CLIP LIAGCLIP 9.375IN

## (undated) DEVICE — BANDAGE ACE DOUBLE STER 6IN

## (undated) DEVICE — SUT ETHBND EXCEL 2-0 RB-1 30IN

## (undated) DEVICE — SUT SILK 4-0 RB-1 30 BL BR

## (undated) DEVICE — ANGIOTOUCH KIT

## (undated) DEVICE — SYR 30CC LUER LOCK

## (undated) DEVICE — SYS PROX SEAL AORT PNCH 4.3MM

## (undated) DEVICE — COVER OVERHEAD SURG LT BLUE

## (undated) DEVICE — HEMOSTAT SURGICEL 4X8IN

## (undated) DEVICE — Device

## (undated) DEVICE — GAUZE SPONGE 4X4 12PLY

## (undated) DEVICE — BLADE SURG #15 CARBON STEEL

## (undated) DEVICE — TAPE SILK 3IN

## (undated) DEVICE — SUT PROLENE 4-0 RB-1 BL MO

## (undated) DEVICE — KIT SYR REUSABLE

## (undated) DEVICE — SOL NS 1000CC

## (undated) DEVICE — DRESSING MEPORE ISLAND 31/2X4

## (undated) DEVICE — SEALANT VISTASEAL FIBRIN 10ML

## (undated) DEVICE — SOL WATER STRL IRR 1000ML

## (undated) DEVICE — SPONGE LAP 18X18 PREWASHED

## (undated) DEVICE — KIT MANIFOLD LOW PRESS TUBING

## (undated) DEVICE — DRESSING TRANS 2X2 TEGADERM

## (undated) DEVICE — CATH URETHRAL RED RUBBER 18FR

## (undated) DEVICE — KIT CATH INSERT LINEAR 7.5F

## (undated) DEVICE — APPLIER LIGACLIP SM 9.38IN

## (undated) DEVICE — CANNULA VSL W/DUCKBILL

## (undated) DEVICE — DRAPE SLUSH WARMER WITH DISC

## (undated) DEVICE — EVACUATOR WOUND BULB 100CC

## (undated) DEVICE — RETRACTOR OCTOBASE INSERT HOLD

## (undated) DEVICE — WIRE GUIDE TEFLON 3CM .035 145

## (undated) DEVICE — GOWN POLY REINF X-LONG 2XL

## (undated) DEVICE — CANNULA VENOUS MC2X 29FR

## (undated) DEVICE — TUBING MEDI-VAC 20FT .25IN

## (undated) DEVICE — FILTER BACTERIA 001851

## (undated) DEVICE — CONNECTOR STRAIGHT 1/4X1/4IN

## (undated) DEVICE — PUNCH AORTIC SHORT 4.4MM

## (undated) DEVICE — PACK OPEN HEART BR

## (undated) DEVICE — CATH DIAG IMPULSE 6FR FR4

## (undated) DEVICE — BLANKET HYPOTHERMIA 25X64IN

## (undated) DEVICE — NDL SAFETY 22G X 1.5 ECLIPSE

## (undated) DEVICE — SHEATH INTRODUCER 6FR 11CM

## (undated) DEVICE — PERFUSION FX X-COATED

## (undated) DEVICE — ELECTRODE BLADE E-Z CLEAN 4IN

## (undated) DEVICE — GUIDE WIRE J-TIP 260CM .025

## (undated) DEVICE — SUT VICRYL 1 OB 36 CTX

## (undated) DEVICE — SPONGE DERMACEA GAUZE 4X4

## (undated) DEVICE — CONNECTOR 3/8X3/8 STERILE

## (undated) DEVICE — SUT SILK 2-0 STRANDS 30IN

## (undated) DEVICE — SUT 7/0 24IN PROLENE BL MO

## (undated) DEVICE — CELL SAVER 4/CASE

## (undated) DEVICE — COUNTER BDL BLADEGUARD LI DBL

## (undated) DEVICE — CATH SWAN GANZ 7FR 110CM

## (undated) DEVICE — PACK CATH LAB CUSTOM BR

## (undated) DEVICE — GOWN POLY REINF X-LONG XL

## (undated) DEVICE — DRESSING MEPORE ADH 3.5X12

## (undated) DEVICE — CABLE EXTENSION PACING 12 DISP

## (undated) DEVICE — GLOVE BIOGEL PI MICRO SZ 6

## (undated) DEVICE — GUIDEWIRE EMERALD .035IN 260CM

## (undated) DEVICE — SEE MEDLINE ITEM 157144

## (undated) DEVICE — KIT URINARY CATH URINE METER

## (undated) DEVICE — CATH IMPULSE PIGTAIL 6F 110CM

## (undated) DEVICE — CATH DIAG IMPULSE 6FR IM

## (undated) DEVICE — CANNULA AG CARDPLG 14G FLANGE

## (undated) DEVICE — ELECTRODE REM PLYHSV RETURN 9

## (undated) DEVICE — CANNULA IMA 1MM

## (undated) DEVICE — SOL ISOLYTE S PH 7.4 1000ML

## (undated) DEVICE — SET PERFUSION

## (undated) DEVICE — SET DECANTER MEDICHOICE

## (undated) DEVICE — CATH IMPULSE 6FR MULTI-PAK

## (undated) DEVICE — GOWN POLY REINF BRTH SLV XL

## (undated) DEVICE — SUT SILK 2-0 SH 18IN BLACK

## (undated) DEVICE — GUIDEWIRE WHOLEY HI TORQ 175CM

## (undated) DEVICE — KIT INTRODUCER STIFFEN MICRO

## (undated) DEVICE — CATH DIAG IMPULSE 6FR FL4

## (undated) DEVICE — SUT ETHIBOND XTRA 5-0 RB-1

## (undated) DEVICE — SHUNT FLO THRU 2.0X18
Type: IMPLANTABLE DEVICE | Site: HEART | Status: NON-FUNCTIONAL
Removed: 2022-07-20

## (undated) DEVICE — DRAIN CHEST DRY SUCTION

## (undated) DEVICE — CLIP STEALTH LATIS 1/4 FORCE 6

## (undated) DEVICE — CONTRAST OMNIPAQUE 240 150ML

## (undated) DEVICE — BLADE KNIFE UNITOME 4.0MM

## (undated) DEVICE — SUT PLEDGET LG SOFT

## (undated) DEVICE — OMNIPAQUE 300MG 150ML VIAL

## (undated) DEVICE — DRAIN CHANNEL ROUND 19FR

## (undated) DEVICE — SET CARDIOPLEGIA DEL

## (undated) DEVICE — SUT PERMA HAND SILK BLK 0-0

## (undated) DEVICE — BLADE SCALP OPHTL BEVEL STR

## (undated) DEVICE — DEVICE MYNX GRIP 6/7F

## (undated) DEVICE — BLADE SURG CARBON STEEL SZ11

## (undated) DEVICE — SENSORS CDI

## (undated) DEVICE — WIRE TEMP PACING 0 SH SKS-3

## (undated) DEVICE — BOWL CELL SAVER 5 225ML

## (undated) DEVICE — SUT MONOCRYL 4-0 PS-1 UND

## (undated) DEVICE — SHEATH INTRODUCER 7FR 11CM

## (undated) DEVICE — INSERT STEALTH SURGICAL CLAMP

## (undated) DEVICE — SET SUCTION ANTICOAGULANT

## (undated) DEVICE — PACK HEART CATH BR

## (undated) DEVICE — SEE MEDLINE ITEM 59163

## (undated) DEVICE — SYS VIRTUOSAPH PLUS EVM

## (undated) DEVICE — DRAIN CHAN RND HUBLS 8MM 24FR